# Patient Record
Sex: MALE | Race: WHITE | NOT HISPANIC OR LATINO | URBAN - METROPOLITAN AREA
[De-identification: names, ages, dates, MRNs, and addresses within clinical notes are randomized per-mention and may not be internally consistent; named-entity substitution may affect disease eponyms.]

---

## 2019-05-20 ENCOUNTER — APPOINTMENT (RX ONLY)
Dept: URBAN - METROPOLITAN AREA CLINIC 125 | Facility: CLINIC | Age: 64
Setting detail: DERMATOLOGY
End: 2019-05-20

## 2019-05-20 DIAGNOSIS — L29.89 OTHER PRURITUS: ICD-10-CM

## 2019-05-20 DIAGNOSIS — L82.0 INFLAMED SEBORRHEIC KERATOSIS: ICD-10-CM

## 2019-05-20 DIAGNOSIS — L82.1 OTHER SEBORRHEIC KERATOSIS: ICD-10-CM

## 2019-05-20 DIAGNOSIS — L81.4 OTHER MELANIN HYPERPIGMENTATION: ICD-10-CM

## 2019-05-20 DIAGNOSIS — B07.8 OTHER VIRAL WARTS: ICD-10-CM

## 2019-05-20 PROBLEM — L55.1 SUNBURN OF SECOND DEGREE: Status: ACTIVE | Noted: 2019-05-20

## 2019-05-20 PROBLEM — L29.8 OTHER PRURITUS: Status: ACTIVE | Noted: 2019-05-20

## 2019-05-20 PROBLEM — D48.5 NEOPLASM OF UNCERTAIN BEHAVIOR OF SKIN: Status: ACTIVE | Noted: 2019-05-20

## 2019-05-20 PROBLEM — E13.9 OTHER SPECIFIED DIABETES MELLITUS WITHOUT COMPLICATIONS: Status: ACTIVE | Noted: 2019-05-20

## 2019-05-20 PROBLEM — M12.9 ARTHROPATHY, UNSPECIFIED: Status: ACTIVE | Noted: 2019-05-20

## 2019-05-20 PROBLEM — L70.0 ACNE VULGARIS: Status: ACTIVE | Noted: 2019-05-20

## 2019-05-20 PROBLEM — I10 ESSENTIAL (PRIMARY) HYPERTENSION: Status: ACTIVE | Noted: 2019-05-20

## 2019-05-20 PROBLEM — L23.7 ALLERGIC CONTACT DERMATITIS DUE TO PLANTS, EXCEPT FOOD: Status: ACTIVE | Noted: 2019-05-20

## 2019-05-20 PROCEDURE — ? COUNSELING

## 2019-05-20 PROCEDURE — 99202 OFFICE O/P NEW SF 15 MIN: CPT

## 2019-05-20 PROCEDURE — ? TREATMENT REGIMEN

## 2019-05-20 ASSESSMENT — LOCATION DETAILED DESCRIPTION DERM
LOCATION DETAILED: RIGHT DISTAL DORSAL FOREARM
LOCATION DETAILED: RIGHT PROXIMAL RADIAL DORSAL FOREARM
LOCATION DETAILED: RIGHT PROXIMAL DORSAL FOREARM
LOCATION DETAILED: MID POSTERIOR NECK
LOCATION DETAILED: LEFT DISTAL DORSAL FOREARM
LOCATION DETAILED: LEFT NASAL ALA
LOCATION DETAILED: LEFT PROXIMAL DORSAL FOREARM

## 2019-05-20 ASSESSMENT — LOCATION SIMPLE DESCRIPTION DERM
LOCATION SIMPLE: LEFT NOSE
LOCATION SIMPLE: RIGHT FOREARM
LOCATION SIMPLE: LEFT FOREARM
LOCATION SIMPLE: POSTERIOR NECK

## 2019-05-20 ASSESSMENT — LOCATION ZONE DERM
LOCATION ZONE: NECK
LOCATION ZONE: NOSE
LOCATION ZONE: ARM

## 2019-05-20 NOTE — PROCEDURE: TREATMENT REGIMEN
Initiate Treatment: Clobetasol cream twice dialy PRN for itching (pt has rx)
Detail Level: Zone
Initiate Treatment: Sarna lotion
Continue Regimen: Clobetasol cream twice daily only when pt has a rash to back of neck

## 2019-05-20 NOTE — PROCEDURE: MIPS QUALITY
Additional Notes: Pain 9-10
Quality 474: Zoster Vaccination Status: Shingrix Vaccination not Administered or Previously Received, Reason not Otherwise Specified
Detail Level: Detailed
Quality 130: Documentation Of Current Medications In The Medical Record: Current Medications Documented
Quality 131: Pain Assessment And Follow-Up: Pain assessment using a standardized tool is documented as negative, no follow-up plan required

## 2019-06-10 ENCOUNTER — APPOINTMENT (RX ONLY)
Dept: URBAN - METROPOLITAN AREA CLINIC 125 | Facility: CLINIC | Age: 64
Setting detail: DERMATOLOGY
End: 2019-06-10

## 2019-06-10 DIAGNOSIS — L82.0 INFLAMED SEBORRHEIC KERATOSIS: ICD-10-CM

## 2019-06-10 DIAGNOSIS — L91.8 OTHER HYPERTROPHIC DISORDERS OF THE SKIN: ICD-10-CM

## 2019-06-10 DIAGNOSIS — L81.4 OTHER MELANIN HYPERPIGMENTATION: ICD-10-CM

## 2019-06-10 DIAGNOSIS — B35.1 TINEA UNGUIUM: ICD-10-CM

## 2019-06-10 DIAGNOSIS — D22 MELANOCYTIC NEVI: ICD-10-CM

## 2019-06-10 DIAGNOSIS — D18.0 HEMANGIOMA: ICD-10-CM

## 2019-06-10 DIAGNOSIS — L82.1 OTHER SEBORRHEIC KERATOSIS: ICD-10-CM

## 2019-06-10 DIAGNOSIS — Z71.89 OTHER SPECIFIED COUNSELING: ICD-10-CM

## 2019-06-10 PROBLEM — D22.5 MELANOCYTIC NEVI OF TRUNK: Status: ACTIVE | Noted: 2019-06-10

## 2019-06-10 PROBLEM — D18.01 HEMANGIOMA OF SKIN AND SUBCUTANEOUS TISSUE: Status: ACTIVE | Noted: 2019-06-10

## 2019-06-10 PROCEDURE — 17110 DESTRUCTION B9 LES UP TO 14: CPT

## 2019-06-10 PROCEDURE — ? LIQUID NITROGEN

## 2019-06-10 PROCEDURE — 99214 OFFICE O/P EST MOD 30 MIN: CPT | Mod: 25

## 2019-06-10 PROCEDURE — ? COUNSELING

## 2019-06-10 ASSESSMENT — LOCATION SIMPLE DESCRIPTION DERM
LOCATION SIMPLE: LEFT ANTERIOR NECK
LOCATION SIMPLE: UPPER BACK
LOCATION SIMPLE: LEFT GREAT TOE
LOCATION SIMPLE: ABDOMEN
LOCATION SIMPLE: RIGHT GREAT TOE
LOCATION SIMPLE: LEFT UPPER BACK
LOCATION SIMPLE: RIGHT ANTERIOR NECK
LOCATION SIMPLE: LEFT NOSE

## 2019-06-10 ASSESSMENT — LOCATION ZONE DERM
LOCATION ZONE: TRUNK
LOCATION ZONE: TOENAIL
LOCATION ZONE: NOSE
LOCATION ZONE: NECK

## 2019-06-10 ASSESSMENT — LOCATION DETAILED DESCRIPTION DERM
LOCATION DETAILED: XIPHOID
LOCATION DETAILED: INFERIOR THORACIC SPINE
LOCATION DETAILED: LEFT NASAL ALA
LOCATION DETAILED: LEFT GREAT TOENAIL
LOCATION DETAILED: RIGHT GREAT TOENAIL
LOCATION DETAILED: LEFT MID-UPPER BACK
LOCATION DETAILED: LEFT CLAVICULAR NECK
LOCATION DETAILED: RIGHT CLAVICULAR NECK

## 2019-06-10 NOTE — PROCEDURE: MIPS QUALITY
Quality 474: Zoster Vaccination Status: Shingrix Vaccination not Administered or Previously Received, Reason not Otherwise Specified
Detail Level: Detailed
Quality 130: Documentation Of Current Medications In The Medical Record: Current Medications Documented
Quality 131: Pain Assessment And Follow-Up: Pain assessment using a standardized tool is documented as negative, no follow-up plan required
Additional Notes: Patient states on a scale of 0-10. Pain level is 0.

## 2019-06-10 NOTE — PROCEDURE: LIQUID NITROGEN
Add 52 Modifier (Optional): no
Consent: The patient's consent was obtained including but not limited to risks of crusting, scabbing, blistering, scarring, darker or lighter pigmentary change, recurrence, incomplete removal and infection.
Medical Necessity Clause: This procedure was medically necessary because the lesions that were treated were:
Post-Care Instructions: I reviewed with the patient in detail post-care instructions. Patient is to wear sunprotection, and avoid picking at any of the treated lesions. Pt may apply Vaseline to crusted or scabbing areas.
Medical Necessity Information: It is in your best interest to select a reason for this procedure from the list below. All of these items fulfill various CMS LCD requirements except the new and changing color options.
Number Of Freeze-Thaw Cycles: 2 freeze-thaw cycles
Render Post-Care Instructions In Note?: yes
Duration Of Freeze Thaw-Cycle (Seconds): 10-15
Detail Level: Simple

## 2020-02-27 ENCOUNTER — OFFICE VISIT (OUTPATIENT)
Dept: INTERNAL MEDICINE | Facility: CLINIC | Age: 65
End: 2020-02-27
Payer: MEDICARE

## 2020-02-27 VITALS
HEART RATE: 66 BPM | BODY MASS INDEX: 37.05 KG/M2 | RESPIRATION RATE: 18 BRPM | DIASTOLIC BLOOD PRESSURE: 72 MMHG | HEIGHT: 72 IN | TEMPERATURE: 99 F | WEIGHT: 273.56 LBS | SYSTOLIC BLOOD PRESSURE: 118 MMHG

## 2020-02-27 DIAGNOSIS — E78.5 HYPERLIPIDEMIA, UNSPECIFIED HYPERLIPIDEMIA TYPE: ICD-10-CM

## 2020-02-27 DIAGNOSIS — R79.89 LOW TESTOSTERONE IN MALE: ICD-10-CM

## 2020-02-27 DIAGNOSIS — Z87.39 HISTORY OF GOUT: ICD-10-CM

## 2020-02-27 DIAGNOSIS — N20.0 KIDNEY STONES: ICD-10-CM

## 2020-02-27 DIAGNOSIS — E11.9 TYPE 2 DIABETES MELLITUS WITHOUT COMPLICATION, WITHOUT LONG-TERM CURRENT USE OF INSULIN: ICD-10-CM

## 2020-02-27 DIAGNOSIS — R53.83 FATIGUE, UNSPECIFIED TYPE: Primary | ICD-10-CM

## 2020-02-27 DIAGNOSIS — Z13.5 SCREENING FOR EYE CONDITION: ICD-10-CM

## 2020-02-27 DIAGNOSIS — Z00.00 ANNUAL PHYSICAL EXAM: ICD-10-CM

## 2020-02-27 PROCEDURE — 99387 PR PREVENTIVE VISIT,NEW,65 & OVER: ICD-10-PCS | Mod: S$PBB,,, | Performed by: NURSE PRACTITIONER

## 2020-02-27 PROCEDURE — 99387 INIT PM E/M NEW PAT 65+ YRS: CPT | Mod: S$PBB,,, | Performed by: NURSE PRACTITIONER

## 2020-02-27 PROCEDURE — 99999 PR PBB SHADOW E&M-NEW PATIENT-LVL IV: ICD-10-PCS | Mod: PBBFAC,,, | Performed by: NURSE PRACTITIONER

## 2020-02-27 PROCEDURE — 99204 OFFICE O/P NEW MOD 45 MIN: CPT | Mod: PBBFAC,PO | Performed by: NURSE PRACTITIONER

## 2020-02-27 PROCEDURE — 99999 PR PBB SHADOW E&M-NEW PATIENT-LVL IV: CPT | Mod: PBBFAC,,, | Performed by: NURSE PRACTITIONER

## 2020-02-27 RX ORDER — ALLOPURINOL 100 MG/1
TABLET ORAL
COMMUNITY
End: 2020-03-10 | Stop reason: SDUPTHER

## 2020-02-27 RX ORDER — GLIMEPIRIDE 2 MG/1
TABLET ORAL
COMMUNITY
End: 2020-03-10 | Stop reason: SDUPTHER

## 2020-02-27 RX ORDER — FENOFIBRATE 160 MG/1
160 TABLET ORAL DAILY
COMMUNITY
End: 2020-03-10 | Stop reason: SDUPTHER

## 2020-02-27 RX ORDER — METFORMIN HYDROCHLORIDE 500 MG/1
TABLET ORAL
COMMUNITY
End: 2020-03-10 | Stop reason: SDUPTHER

## 2020-02-27 RX ORDER — POTASSIUM CITRATE 10 MEQ/1
TABLET, EXTENDED RELEASE ORAL
COMMUNITY
End: 2020-06-22 | Stop reason: SDUPTHER

## 2020-02-27 RX ORDER — LISINOPRIL AND HYDROCHLOROTHIAZIDE 20; 25 MG/1; MG/1
TABLET ORAL
COMMUNITY
Start: 2020-02-24 | End: 2020-03-10 | Stop reason: SDUPTHER

## 2020-03-04 ENCOUNTER — LAB VISIT (OUTPATIENT)
Dept: LAB | Facility: HOSPITAL | Age: 65
End: 2020-03-04
Attending: NURSE PRACTITIONER
Payer: MEDICARE

## 2020-03-04 DIAGNOSIS — Z87.39 HISTORY OF GOUT: ICD-10-CM

## 2020-03-04 DIAGNOSIS — E78.5 HYPERLIPIDEMIA, UNSPECIFIED HYPERLIPIDEMIA TYPE: ICD-10-CM

## 2020-03-04 DIAGNOSIS — R79.89 LOW TESTOSTERONE IN MALE: ICD-10-CM

## 2020-03-04 DIAGNOSIS — E11.9 TYPE 2 DIABETES MELLITUS WITHOUT COMPLICATION, WITHOUT LONG-TERM CURRENT USE OF INSULIN: ICD-10-CM

## 2020-03-04 DIAGNOSIS — N20.0 KIDNEY STONES: ICD-10-CM

## 2020-03-04 DIAGNOSIS — R53.83 FATIGUE, UNSPECIFIED TYPE: ICD-10-CM

## 2020-03-04 DIAGNOSIS — Z00.00 ANNUAL PHYSICAL EXAM: ICD-10-CM

## 2020-03-04 LAB
ALBUMIN SERPL BCP-MCNC: 4.1 G/DL (ref 3.5–5.2)
ALP SERPL-CCNC: 53 U/L (ref 55–135)
ALT SERPL W/O P-5'-P-CCNC: 43 U/L (ref 10–44)
ANION GAP SERPL CALC-SCNC: 10 MMOL/L (ref 8–16)
AST SERPL-CCNC: 34 U/L (ref 10–40)
BASOPHILS # BLD AUTO: 0.06 K/UL (ref 0–0.2)
BASOPHILS NFR BLD: 1.1 % (ref 0–1.9)
BILIRUB SERPL-MCNC: 0.3 MG/DL (ref 0.1–1)
BUN SERPL-MCNC: 19 MG/DL (ref 8–23)
CALCIUM SERPL-MCNC: 9.5 MG/DL (ref 8.7–10.5)
CHLORIDE SERPL-SCNC: 102 MMOL/L (ref 95–110)
CHOLEST SERPL-MCNC: 199 MG/DL (ref 120–199)
CHOLEST/HDLC SERPL: 4.7 {RATIO} (ref 2–5)
CO2 SERPL-SCNC: 28 MMOL/L (ref 23–29)
CREAT SERPL-MCNC: 1.4 MG/DL (ref 0.5–1.4)
DIFFERENTIAL METHOD: ABNORMAL
EOSINOPHIL # BLD AUTO: 0.2 K/UL (ref 0–0.5)
EOSINOPHIL NFR BLD: 3.5 % (ref 0–8)
ERYTHROCYTE [DISTWIDTH] IN BLOOD BY AUTOMATED COUNT: 13.5 % (ref 11.5–14.5)
EST. GFR  (AFRICAN AMERICAN): >60 ML/MIN/1.73 M^2
EST. GFR  (NON AFRICAN AMERICAN): 52.4 ML/MIN/1.73 M^2
ESTIMATED AVG GLUCOSE: 183 MG/DL (ref 68–131)
GLUCOSE SERPL-MCNC: 146 MG/DL (ref 70–110)
HBA1C MFR BLD HPLC: 8 % (ref 4–5.6)
HCT VFR BLD AUTO: 45.7 % (ref 40–54)
HDLC SERPL-MCNC: 42 MG/DL (ref 40–75)
HDLC SERPL: 21.1 % (ref 20–50)
HGB BLD-MCNC: 13.8 G/DL (ref 14–18)
IMM GRANULOCYTES # BLD AUTO: 0.04 K/UL (ref 0–0.04)
IMM GRANULOCYTES NFR BLD AUTO: 0.7 % (ref 0–0.5)
LDLC SERPL CALC-MCNC: 108.8 MG/DL (ref 63–159)
LYMPHOCYTES # BLD AUTO: 2.2 K/UL (ref 1–4.8)
LYMPHOCYTES NFR BLD: 39.4 % (ref 18–48)
MCH RBC QN AUTO: 29.2 PG (ref 27–31)
MCHC RBC AUTO-ENTMCNC: 30.2 G/DL (ref 32–36)
MCV RBC AUTO: 97 FL (ref 82–98)
MONOCYTES # BLD AUTO: 0.5 K/UL (ref 0.3–1)
MONOCYTES NFR BLD: 7.9 % (ref 4–15)
NEUTROPHILS # BLD AUTO: 2.7 K/UL (ref 1.8–7.7)
NEUTROPHILS NFR BLD: 47.4 % (ref 38–73)
NONHDLC SERPL-MCNC: 157 MG/DL
NRBC BLD-RTO: 0 /100 WBC
PLATELET # BLD AUTO: 192 K/UL (ref 150–350)
PMV BLD AUTO: 10.7 FL (ref 9.2–12.9)
POTASSIUM SERPL-SCNC: 4.9 MMOL/L (ref 3.5–5.1)
PROT SERPL-MCNC: 7.3 G/DL (ref 6–8.4)
RBC # BLD AUTO: 4.72 M/UL (ref 4.6–6.2)
SODIUM SERPL-SCNC: 140 MMOL/L (ref 136–145)
T4 FREE SERPL-MCNC: 0.96 NG/DL (ref 0.71–1.51)
TESTOST SERPL-MCNC: 240 NG/DL (ref 304–1227)
TRIGL SERPL-MCNC: 241 MG/DL (ref 30–150)
TSH SERPL DL<=0.005 MIU/L-ACNC: 2.01 UIU/ML (ref 0.4–4)
URATE SERPL-MCNC: 5.6 MG/DL (ref 3.4–7)
WBC # BLD AUTO: 5.68 K/UL (ref 3.9–12.7)

## 2020-03-04 PROCEDURE — 36415 COLL VENOUS BLD VENIPUNCTURE: CPT | Mod: PO

## 2020-03-04 PROCEDURE — 84403 ASSAY OF TOTAL TESTOSTERONE: CPT

## 2020-03-04 PROCEDURE — 84550 ASSAY OF BLOOD/URIC ACID: CPT

## 2020-03-04 PROCEDURE — 83036 HEMOGLOBIN GLYCOSYLATED A1C: CPT

## 2020-03-04 PROCEDURE — 80053 COMPREHEN METABOLIC PANEL: CPT

## 2020-03-04 PROCEDURE — 84443 ASSAY THYROID STIM HORMONE: CPT

## 2020-03-04 PROCEDURE — 80061 LIPID PANEL: CPT

## 2020-03-04 PROCEDURE — 85025 COMPLETE CBC W/AUTO DIFF WBC: CPT

## 2020-03-04 PROCEDURE — 84439 ASSAY OF FREE THYROXINE: CPT

## 2020-03-06 ENCOUNTER — OFFICE VISIT (OUTPATIENT)
Dept: OPTOMETRY | Facility: CLINIC | Age: 65
End: 2020-03-06
Payer: MEDICARE

## 2020-03-06 DIAGNOSIS — E11.9 TYPE 2 DIABETES MELLITUS WITHOUT RETINOPATHY: Primary | ICD-10-CM

## 2020-03-06 DIAGNOSIS — H52.203 HYPEROPIA WITH ASTIGMATISM AND PRESBYOPIA, BILATERAL: ICD-10-CM

## 2020-03-06 DIAGNOSIS — E11.9 TYPE 2 DIABETES MELLITUS WITHOUT COMPLICATION, WITHOUT LONG-TERM CURRENT USE OF INSULIN: ICD-10-CM

## 2020-03-06 DIAGNOSIS — H25.13 NUCLEAR SCLEROSIS OF BOTH EYES: ICD-10-CM

## 2020-03-06 DIAGNOSIS — H52.03 HYPEROPIA WITH ASTIGMATISM AND PRESBYOPIA, BILATERAL: ICD-10-CM

## 2020-03-06 DIAGNOSIS — H52.4 HYPEROPIA WITH ASTIGMATISM AND PRESBYOPIA, BILATERAL: ICD-10-CM

## 2020-03-06 DIAGNOSIS — Z13.5 SCREENING FOR EYE CONDITION: ICD-10-CM

## 2020-03-06 PROCEDURE — 92015 PR REFRACTION: ICD-10-PCS | Mod: ,,, | Performed by: OPTOMETRIST

## 2020-03-06 PROCEDURE — 99213 OFFICE O/P EST LOW 20 MIN: CPT | Mod: PBBFAC,PO | Performed by: OPTOMETRIST

## 2020-03-06 PROCEDURE — 99999 PR PBB SHADOW E&M-EST. PATIENT-LVL III: CPT | Mod: PBBFAC,,, | Performed by: OPTOMETRIST

## 2020-03-06 PROCEDURE — 92015 DETERMINE REFRACTIVE STATE: CPT | Mod: ,,, | Performed by: OPTOMETRIST

## 2020-03-06 PROCEDURE — 99999 PR PBB SHADOW E&M-EST. PATIENT-LVL III: ICD-10-PCS | Mod: PBBFAC,,, | Performed by: OPTOMETRIST

## 2020-03-06 PROCEDURE — 92004 PR EYE EXAM, NEW PATIENT,COMPREHESV: ICD-10-PCS | Mod: S$PBB,,, | Performed by: OPTOMETRIST

## 2020-03-06 PROCEDURE — 92004 COMPRE OPH EXAM NEW PT 1/>: CPT | Mod: S$PBB,,, | Performed by: OPTOMETRIST

## 2020-03-06 NOTE — PROGRESS NOTES
HPI     PARTH:10 mons   Glasses? Yes rdrs   Contacts? no  H/o eye surgery, injections or laser: no  H/o eye injury: no  Known eye conditions? no  Family h/o eye conditions? Mother-RD Father-cataracts   Eye gtts?Visine, ,systane PRN     (-) Flashes (-) Floaters (-) Mucous   (-) Tearing (-) Itching (-) Burning   (-) Headaches (-) Eye Pain/discomfort (-) Irritation   (-) Redness (-) Double vision (+) Blurry vision Overall when sugar levels   are off     Diabetic? (+)  A1c?  (Hemoglobin A1C       Date                     Value               Ref Range             Status                03/04/2020               8.0 (H)             4.0 - 5.6 %           Final              Comment:    ADA Screening Guidelines:  5.7-6.4%  Consistent with   prediabetes  >or=6.5%  Consistent with diabetes  High levels of fetal   hemoglobin interfere with the HbA1C  assay. Heterozygous hemoglobin   variants (HbS, HgC, etc)do  not significantly interfere with this assay.     However, presence of multiple variants may affect accuracy.    ----------)        Last edited by Genna Heranndez on 3/6/2020 10:52 AM. (History)            Assessment /Plan     For exam results, see Encounter Report.    Type 2 diabetes mellitus without retinopathy    Type 2 diabetes mellitus without complication, without long-term current use of insulin  -     Ambulatory referral/consult to Ophthalmology    Screening for eye condition  -     Ambulatory referral/consult to Ophthalmology    Nuclear sclerosis of both eyes    Hyperopia with astigmatism and presbyopia, bilateral      1-3. BS control. No signs of diabetic retinopathy. Monitor with annual exam.  4. Nuclear sclerotic cataract - not visually significant. Observe.  5. SRx released to patient. Patient educated on lens options. Normal ocular health. RTC 1 year for routine exam.

## 2020-03-10 DIAGNOSIS — E78.1 HYPERTRIGLYCERIDEMIA: Primary | ICD-10-CM

## 2020-03-10 DIAGNOSIS — E11.65 UNCONTROLLED TYPE 2 DIABETES MELLITUS WITH HYPERGLYCEMIA: ICD-10-CM

## 2020-03-10 RX ORDER — FENOFIBRATE 160 MG/1
160 TABLET ORAL DAILY
Qty: 30 TABLET | Refills: 3 | Status: SHIPPED | OUTPATIENT
Start: 2020-03-10 | End: 2020-07-08

## 2020-03-10 RX ORDER — METFORMIN HYDROCHLORIDE 500 MG/1
TABLET ORAL
Qty: 30 TABLET | Refills: 3 | Status: SHIPPED | OUTPATIENT
Start: 2020-03-10 | End: 2020-07-08

## 2020-03-10 RX ORDER — GLIMEPIRIDE 2 MG/1
TABLET ORAL
Qty: 60 TABLET | Refills: 3 | Status: SHIPPED | OUTPATIENT
Start: 2020-03-10 | End: 2020-06-13

## 2020-03-10 RX ORDER — ALLOPURINOL 100 MG/1
TABLET ORAL
Qty: 30 TABLET | Refills: 3 | Status: SHIPPED | OUTPATIENT
Start: 2020-03-10 | End: 2020-07-08

## 2020-03-10 RX ORDER — LISINOPRIL AND HYDROCHLOROTHIAZIDE 20; 25 MG/1; MG/1
1 TABLET ORAL DAILY
Qty: 30 TABLET | Refills: 3 | Status: SHIPPED | OUTPATIENT
Start: 2020-03-10 | End: 2020-06-13

## 2020-03-10 NOTE — TELEPHONE ENCOUNTER
So very sorry for the delay    Testosterone level low at 240, range starts at 300, we can refer to urology for further eval since we don't manage this med  Uric acid level wnl  A1c at 8%, needs to be 7% or less, will need to increase glimepiride to 4mg, cont metformin. Needs to follow diabetic diet, stay hydrated with water-would pt like a referral to diabetic teaching?  Cholesterol panel, triglycerides elevated at 241, should be less than 150, decrease carbs, alcohol, weight loss  Thyroid level wnl  Kidney  Function is 52.4, would like it to be >60, diabetes and cholesterol is a strain,   lilver fcn, electrolytes, wbc all wnl  No infection or anemia noted    Pt will need to f/u with a physician (Dr. Turner) w/in 3 months  Will reorder labs for repeat in 3 months

## 2020-03-11 ENCOUNTER — TELEPHONE (OUTPATIENT)
Dept: INTERNAL MEDICINE | Facility: CLINIC | Age: 65
End: 2020-03-11

## 2020-03-11 NOTE — TELEPHONE ENCOUNTER
----- Message from Summer Ann sent at 3/11/2020  4:19 PM CDT -----  Contact: Patient [phone]  Patient is returning a phone call.  Who left a message for the patient:   Does patient know what this is regarding:    Comments:

## 2020-03-13 ENCOUNTER — TELEPHONE (OUTPATIENT)
Dept: INTERNAL MEDICINE | Facility: CLINIC | Age: 65
End: 2020-03-13

## 2020-03-13 NOTE — TELEPHONE ENCOUNTER
----- Message from Marquita Manuel sent at 3/13/2020  4:04 PM CDT -----  Contact: [phone]  Patient would like to speak to the nurse in regards to his coming appointment for Monday 3-16-20 at 9:30AM. Please call and advise.

## 2020-03-17 ENCOUNTER — TELEPHONE (OUTPATIENT)
Dept: INTERNAL MEDICINE | Facility: CLINIC | Age: 65
End: 2020-03-17

## 2020-03-17 NOTE — TELEPHONE ENCOUNTER
----- Message from Ralph Martinez sent at 3/17/2020  4:30 PM CDT -----  Contact: Self [phone]  Calling to get test results.  Name of test (lab, xray, etc.):   labs  Date of test:  3/4/20  Ordering provider: GLENDA Morrison  Where was the test performed:  [facility]  Would the patient rather a call back or a response via [facility]?:  Call back  Comments:

## 2020-06-22 NOTE — TELEPHONE ENCOUNTER
----- Message from Karla Otoole sent at 6/22/2020 11:14 AM CDT -----  Regarding: appointments and refill  Contact: patient [phone]  Requesting an RX refill or new RX.  Is this a refill or new RX:  refill  RX name and strength: potassium citrate (UROCIT-K) 10 mEq (1,080 mg) TbSR  Directions (copy/paste from chart): potassium citrate ER 10 mEq (1,080 mg) tablet,extended release   Is this a 30 day or 90 day RX:  90  Local pharmacy or mail order pharmacy:  local  Pharmacy name and phone # [id number] - [place], [place] - [address] AT [facility] [address] [phone] (Phone)  [phone] (Fax)      Patient would also discuss appointments that you spoke about before COVID 19 at last visit

## 2020-06-23 RX ORDER — POTASSIUM CITRATE 10 MEQ/1
TABLET, EXTENDED RELEASE ORAL
Qty: 60 TABLET | Refills: 1 | Status: SHIPPED | OUTPATIENT
Start: 2020-06-23 | End: 2020-06-23 | Stop reason: SDUPTHER

## 2020-06-23 RX ORDER — POTASSIUM CITRATE 10 MEQ/1
TABLET, EXTENDED RELEASE ORAL
Qty: 60 TABLET | Refills: 1 | Status: SHIPPED | OUTPATIENT
Start: 2020-06-23 | End: 2020-08-23

## 2020-08-20 ENCOUNTER — OFFICE VISIT (OUTPATIENT)
Dept: INTERNAL MEDICINE | Facility: CLINIC | Age: 65
End: 2020-08-20
Payer: MEDICARE

## 2020-08-20 ENCOUNTER — LAB VISIT (OUTPATIENT)
Dept: LAB | Facility: HOSPITAL | Age: 65
End: 2020-08-20
Attending: NURSE PRACTITIONER
Payer: MEDICARE

## 2020-08-20 VITALS
RESPIRATION RATE: 16 BRPM | HEART RATE: 76 BPM | TEMPERATURE: 98 F | BODY MASS INDEX: 36.16 KG/M2 | DIASTOLIC BLOOD PRESSURE: 62 MMHG | HEIGHT: 72 IN | SYSTOLIC BLOOD PRESSURE: 126 MMHG | OXYGEN SATURATION: 95 % | WEIGHT: 267 LBS

## 2020-08-20 DIAGNOSIS — R94.31 ABNORMAL EKG: ICD-10-CM

## 2020-08-20 DIAGNOSIS — N18.30 CKD (CHRONIC KIDNEY DISEASE) STAGE 3, GFR 30-59 ML/MIN: ICD-10-CM

## 2020-08-20 DIAGNOSIS — I10 ESSENTIAL HYPERTENSION: ICD-10-CM

## 2020-08-20 DIAGNOSIS — E11.9 TYPE 2 DIABETES MELLITUS WITHOUT COMPLICATION, WITHOUT LONG-TERM CURRENT USE OF INSULIN: ICD-10-CM

## 2020-08-20 DIAGNOSIS — E11.9 TYPE 2 DIABETES MELLITUS WITHOUT COMPLICATION, WITHOUT LONG-TERM CURRENT USE OF INSULIN: Primary | ICD-10-CM

## 2020-08-20 DIAGNOSIS — Z79.4 TYPE 2 DIABETES MELLITUS WITHOUT COMPLICATION, WITH LONG-TERM CURRENT USE OF INSULIN: ICD-10-CM

## 2020-08-20 DIAGNOSIS — M10.9 GOUT, UNSPECIFIED CAUSE, UNSPECIFIED CHRONICITY, UNSPECIFIED SITE: ICD-10-CM

## 2020-08-20 DIAGNOSIS — R53.83 FATIGUE, UNSPECIFIED TYPE: ICD-10-CM

## 2020-08-20 DIAGNOSIS — E78.1 HYPERTRIGLYCERIDEMIA: ICD-10-CM

## 2020-08-20 DIAGNOSIS — R07.89 CHEST TIGHTNESS: ICD-10-CM

## 2020-08-20 DIAGNOSIS — G47.33 OSA (OBSTRUCTIVE SLEEP APNEA): ICD-10-CM

## 2020-08-20 DIAGNOSIS — E11.9 TYPE 2 DIABETES MELLITUS WITHOUT COMPLICATION, WITH LONG-TERM CURRENT USE OF INSULIN: ICD-10-CM

## 2020-08-20 LAB
ALBUMIN SERPL BCP-MCNC: 4.3 G/DL (ref 3.5–5.2)
ALP SERPL-CCNC: 49 U/L (ref 55–135)
ALT SERPL W/O P-5'-P-CCNC: 38 U/L (ref 10–44)
ANION GAP SERPL CALC-SCNC: 10 MMOL/L (ref 8–16)
AST SERPL-CCNC: 42 U/L (ref 10–40)
BASOPHILS # BLD AUTO: 0.04 K/UL (ref 0–0.2)
BASOPHILS NFR BLD: 0.6 % (ref 0–1.9)
BILIRUB SERPL-MCNC: 0.3 MG/DL (ref 0.1–1)
BUN SERPL-MCNC: 21 MG/DL (ref 8–23)
CALCIUM SERPL-MCNC: 9.9 MG/DL (ref 8.7–10.5)
CHLORIDE SERPL-SCNC: 106 MMOL/L (ref 95–110)
CO2 SERPL-SCNC: 27 MMOL/L (ref 23–29)
CREAT SERPL-MCNC: 1.6 MG/DL (ref 0.5–1.4)
DIFFERENTIAL METHOD: ABNORMAL
EOSINOPHIL # BLD AUTO: 0.3 K/UL (ref 0–0.5)
EOSINOPHIL NFR BLD: 4.2 % (ref 0–8)
ERYTHROCYTE [DISTWIDTH] IN BLOOD BY AUTOMATED COUNT: 13.5 % (ref 11.5–14.5)
EST. GFR  (AFRICAN AMERICAN): 51.1 ML/MIN/1.73 M^2
EST. GFR  (NON AFRICAN AMERICAN): 44.2 ML/MIN/1.73 M^2
GLUCOSE SERPL-MCNC: 137 MG/DL (ref 70–110)
HCT VFR BLD AUTO: 42.9 % (ref 40–54)
HGB BLD-MCNC: 13.1 G/DL (ref 14–18)
IMM GRANULOCYTES # BLD AUTO: 0.02 K/UL (ref 0–0.04)
IMM GRANULOCYTES NFR BLD AUTO: 0.3 % (ref 0–0.5)
LYMPHOCYTES # BLD AUTO: 2.2 K/UL (ref 1–4.8)
LYMPHOCYTES NFR BLD: 32 % (ref 18–48)
MCH RBC QN AUTO: 29.5 PG (ref 27–31)
MCHC RBC AUTO-ENTMCNC: 30.5 G/DL (ref 32–36)
MCV RBC AUTO: 97 FL (ref 82–98)
MONOCYTES # BLD AUTO: 0.6 K/UL (ref 0.3–1)
MONOCYTES NFR BLD: 8.3 % (ref 4–15)
NEUTROPHILS # BLD AUTO: 3.7 K/UL (ref 1.8–7.7)
NEUTROPHILS NFR BLD: 54.6 % (ref 38–73)
NRBC BLD-RTO: 0 /100 WBC
PLATELET # BLD AUTO: 201 K/UL (ref 150–350)
PMV BLD AUTO: 10.8 FL (ref 9.2–12.9)
POTASSIUM SERPL-SCNC: 4.3 MMOL/L (ref 3.5–5.1)
PROT SERPL-MCNC: 7.4 G/DL (ref 6–8.4)
RBC # BLD AUTO: 4.44 M/UL (ref 4.6–6.2)
SODIUM SERPL-SCNC: 143 MMOL/L (ref 136–145)
WBC # BLD AUTO: 6.71 K/UL (ref 3.9–12.7)

## 2020-08-20 PROCEDURE — 99999 PR PBB SHADOW E&M-EST. PATIENT-LVL IV: CPT | Mod: PBBFAC,,, | Performed by: NURSE PRACTITIONER

## 2020-08-20 PROCEDURE — 80053 COMPREHEN METABOLIC PANEL: CPT

## 2020-08-20 PROCEDURE — 99214 OFFICE O/P EST MOD 30 MIN: CPT | Mod: S$PBB,,, | Performed by: NURSE PRACTITIONER

## 2020-08-20 PROCEDURE — 93005 ELECTROCARDIOGRAM TRACING: CPT | Mod: PBBFAC,PO | Performed by: INTERNAL MEDICINE

## 2020-08-20 PROCEDURE — 93010 EKG 12-LEAD: ICD-10-PCS | Mod: S$PBB,,, | Performed by: INTERNAL MEDICINE

## 2020-08-20 PROCEDURE — 93010 ELECTROCARDIOGRAM REPORT: CPT | Mod: S$PBB,,, | Performed by: INTERNAL MEDICINE

## 2020-08-20 PROCEDURE — 99999 PR PBB SHADOW E&M-EST. PATIENT-LVL IV: ICD-10-PCS | Mod: PBBFAC,,, | Performed by: NURSE PRACTITIONER

## 2020-08-20 PROCEDURE — 85025 COMPLETE CBC W/AUTO DIFF WBC: CPT

## 2020-08-20 PROCEDURE — 36415 COLL VENOUS BLD VENIPUNCTURE: CPT | Mod: PO

## 2020-08-20 PROCEDURE — 83036 HEMOGLOBIN GLYCOSYLATED A1C: CPT

## 2020-08-20 PROCEDURE — 99214 OFFICE O/P EST MOD 30 MIN: CPT | Mod: PBBFAC,25,PO | Performed by: NURSE PRACTITIONER

## 2020-08-20 PROCEDURE — 99214 PR OFFICE/OUTPT VISIT, EST, LEVL IV, 30-39 MIN: ICD-10-PCS | Mod: S$PBB,,, | Performed by: NURSE PRACTITIONER

## 2020-08-21 LAB
ESTIMATED AVG GLUCOSE: 157 MG/DL (ref 68–131)
HBA1C MFR BLD HPLC: 7.1 % (ref 4–5.6)

## 2020-08-22 PROBLEM — I10 ESSENTIAL HYPERTENSION: Status: ACTIVE | Noted: 2020-08-22

## 2020-08-22 PROBLEM — Z79.4 TYPE 2 DIABETES MELLITUS WITHOUT COMPLICATION, WITH LONG-TERM CURRENT USE OF INSULIN: Status: ACTIVE | Noted: 2020-08-22

## 2020-08-22 PROBLEM — E78.1 HYPERTRIGLYCERIDEMIA: Status: ACTIVE | Noted: 2020-08-22

## 2020-08-22 PROBLEM — E11.9 TYPE 2 DIABETES MELLITUS WITHOUT COMPLICATION, WITH LONG-TERM CURRENT USE OF INSULIN: Status: ACTIVE | Noted: 2020-08-22

## 2020-08-22 PROBLEM — G47.33 OSA (OBSTRUCTIVE SLEEP APNEA): Status: ACTIVE | Noted: 2020-08-22

## 2020-08-22 PROBLEM — M10.9 GOUT: Status: ACTIVE | Noted: 2020-08-22

## 2020-08-22 NOTE — PROGRESS NOTES
[facility]    Chief Complaint      Chief Complaint   Patient presents with    [facility]    Annual Exam     History of Present Illness      Lukasz Person is a 65 y.o. male patient who presents today for follow up, wants to discuss meds and further plans.  Patient states he is feeling well,  denies shortness of breath or chest pain, reviewed meds and history with patient.  Patient reports of having neck pain which is causing some headaches over the past few weeks, states has been sleeping on 2 pillows recently, will try to decrease back to 1 pillow, apply warm cool compresses, and take Tylenol for headaches as needed.  A patient reports does walk 2-3 miles per day, but has been feeling very fatigued, and he has taken out after exercise.  Reports a possible occasional pressure in his upper chest and neck at times during exercise.  But states quickly is resolved after a few seconds and is able to continue exercise, denies shortness of breath chest pain during event.    EKG-NSR, nonspecific ST and T wave abnormality, will refer to cards for further eval, no other testing to compare to.    Will get medical records  Pt may need to get repeat sleep study to be able to get new machine supplies.      Labs-reviewed with patient  [id number] from 8% to 7.1%  Colonoscopy-  Eye exam-3/2020           Problem List Items Addressed This Visit        Cardiac/Vascular    Hypertriglyceridemia    Essential hypertension       Endocrine    Type 2 diabetes mellitus without complication, with long-term current use of insulin       Orthopedic    Gout       Other    KUMAR (obstructive sleep apnea)      Other Visit Diagnoses     Type 2 diabetes mellitus without complication, without long-term current use of insulin    -  Primary    Relevant Orders    Hemoglobin A1C (Completed)    CBC auto differential (Completed)    Comprehensive metabolic panel (Completed)    IN OFFICE EKG 12-LEAD (to [facility]) (Completed)    Fatigue,  unspecified type        Relevant Orders    CBC auto differential (Completed)    Comprehensive metabolic panel (Completed)    IN OFFICE EKG 12-LEAD (to [facility]) (Completed)    Chest tightness        Relevant Orders    CBC auto differential (Completed)    Comprehensive metabolic panel (Completed)    IN OFFICE EKG 12-LEAD (to [place]) (Completed)    Ambulatory referral/consult to Cardiology    CKD (chronic kidney disease) stage 3, GFR 30-59 ml/min        Relevant Orders    CBC auto differential (Completed)    Comprehensive metabolic panel (Completed)    IN OFFICE EKG 12-LEAD (to [facility]) (Completed)    Abnormal EKG        Relevant Orders    Ambulatory referral/consult to Cardiology          Health Maintenance   Topic Date Due    Hepatitis C Screening  1955    Foot Exam  03/19/1964    TETANUS VACCINE  03/19/1972    Low Dose Statin  03/19/1975    Pneumococcal Vaccine (65+ Low/Medium Risk) (2 of 2 - PPSV23) 06/24/2020    Hemoglobin A1c  02/20/2021    Lipid Panel  03/04/2021    Eye Exam  03/06/2021       No past medical history on file.    No past surgical history on file.    family history is not on file.     Social History     Tobacco Use    Smoking status: Never Smoker    Smokeless tobacco: Never Used   Substance Use Topics    Alcohol Use     Frequency: 2-4 times a month     Drinks per session: 3 or 4     Binge frequency: Less than monthly    Drug use: Not on file       Review of Systems   Constitutional: Negative for chills and fever.   HENT: Negative for congestion, hearing loss, sinus pain and sore throat.    Eyes: Negative for blurred vision and discharge.   Respiratory: Negative for cough, shortness of breath and wheezing.    Cardiovascular: Negative for chest pain, palpitations and leg swelling.   Gastrointestinal: Negative for abdominal pain, constipation, diarrhea, nausea and vomiting.   Genitourinary: Negative for dysuria and hematuria.   Musculoskeletal: Positive for neck pain. Negative for myalgias.    Skin: Negative for rash.   Neurological: Positive for headaches. Negative for dizziness and weakness.   Psychiatric/Behavioral: Negative for depression. The patient is not nervous/anxious.         Outpatient Encounter Medications as of 8/20/2020   Medication Sig Dispense Refill    allopurinoL (ZYLOPRIM) 100 MG tablet TAKE 1 TABLET BY MOUTH ONCE DAILY 30 tablet 3    fenofibrate 160 MG Tab TAKE 1 TABLET(160 MG) BY MOUTH EVERY DAY 30 tablet 3    glimepiride (AMARYL) 2 MG tablet TAKE 2 TABLETS BY MOUTH EVERY MORNING 60 tablet 3    lisinopriL-hydrochlorothiazide (PRINZIDE,ZESTORETIC) 20-25 mg Tab TAKE 1 TABLET BY MOUTH EVERY DAY 30 tablet 3    metFORMIN (GLUCOPHAGE) 500 MG tablet TAKE 2 TABLETS BY MOUTH EVERY MORNING AND 2 WITH DINNER 120 tablet 0    potassium citrate (UROCIT-K) 10 mEq (1,080 mg) TbSR potassium citrate ER 10 mEq (1,080 mg) tablet,extended release   TAKE ONE TABLET BY MOUTH TWICE A DAY 60 tablet 1     No facility-administered encounter medications on file as of 8/20/2020.        Review of patient's allergies indicates:  No Known Allergies    Physical Exam      Vital Signs  Temp: 97.5 °F (36.4 °C)  Temp src: Temporal  Pulse: 76  Resp: 16  SpO2: 95 %  BP: 126/62  BP Location: Left arm  Patient Position: Sitting  Pain Score:   5  Pain Loc: Neck  Height and Weight  Height: 6' (182.9 cm)  Weight: 121.1 kg (266 lb 15.6 oz)  BSA (Calculated - sq m): 2.48 sq meters  BMI (Calculated): 36.2  Weight in (lb) to have BMI = 25: 183.9]    Physical Exam  Vitals signs and nursing note reviewed.   Constitutional:       Appearance: Normal appearance. He is well-developed.   HENT:      Head: Normocephalic and atraumatic.      Right Ear: External ear normal.      Left Ear: External ear normal.   Eyes:      Conjunctiva/sclera: Conjunctivae normal.   Neck:      Musculoskeletal: Normal range of motion and neck supple.      Thyroid: No thyromegaly.      Vascular: No JVD.      Trachea: No tracheal deviation.    Cardiovascular:      Rate and Rhythm: Normal rate and regular rhythm.      Heart sounds: Normal heart sounds.   Pulmonary:      Effort: Pulmonary effort is normal.      Breath sounds: Normal breath sounds.   Abdominal:      General: Bowel sounds are normal. There is no distension.      Palpations: Abdomen is soft.      Tenderness: There is no abdominal tenderness.   Musculoskeletal: Normal range of motion.   Lymphadenopathy:      Cervical: No cervical adenopathy.   Skin:     General: Skin is warm and dry.   Neurological:      Mental Status: He is alert and oriented to person, place, and time.   Psychiatric:         Behavior: Behavior normal.         Thought Content: Thought content normal.         Judgment: Judgment normal.          Laboratory:  CBC:  Recent Labs   Lab Result Units 08/20/20  1550   WBC K/uL 6.71   RBC M/uL 4.44*   Hemoglobin g/dL 13.1*   Hematocrit % 42.9   Platelets K/uL 201   Mean Corpuscular Volume fL 97   Mean Corpuscular Hemoglobin pg 29.5   Mean Corpuscular Hemoglobin Conc g/dL 30.5*     CMP:  Recent Labs   Lab Result Units 08/20/20  1550   Glucose mg/dL 137*   Calcium mg/dL 9.9   Albumin g/dL 4.3   Total Protein g/dL 7.4   Sodium mmol/L 143   Potassium mmol/L 4.3   CO2 mmol/L 27   Chloride mmol/L 106   BUN, Bld mg/dL 21   Alkaline Phosphatase U/L 49*   ALT U/L 38   AST U/L 42*   Total Bilirubin mg/dL 0.3     URINALYSIS:  No results for input(s): COLORU, CLARITYU, SPECGRAV, PHUR, PROTEINUA, GLUCOSEU, BILIRUBINCON, BLOODU, WBCU, RBCU, BACTERIA, MUCUS, NITRITE, LEUKOCYTESUR, UROBILINOGEN, HYALINECASTS in the last 2160 hours.   LIPIDS:  No results for input(s): TSH, HDL, CHOL, TRIG, LDLCALC, CHOLHDL, NONHDLCHOL, TOTALCHOLEST in the last 2160 hours.  TSH:  No results for input(s): TSH in the last 2160 hours.  A1C:  Recent Labs   Lab Result Units 08/20/20  1550   Hemoglobin A1C % 7.1*         Assessment/Plan     Lukasz Person is a 65 y.o.male with:    1. Type 2 diabetes mellitus without  complication, without long-term current use of insulin  - Hemoglobin A1C; Future  - CBC auto differential; Future  - Comprehensive metabolic panel; Future  - IN OFFICE EKG 12-LEAD (to [facility])    2. Fatigue, unspecified type  - CBC auto differential; Future  - Comprehensive metabolic panel; Future  - IN OFFICE EKG 12-LEAD (to [facility])    3. Chest tightness  - CBC auto differential; Future  - Comprehensive metabolic panel; Future  - IN OFFICE EKG 12-LEAD (to [place])  - Ambulatory referral/consult to Cardiology; Future    4. CKD (chronic kidney disease) stage 3, GFR 30-59 ml/min  - CBC auto differential; Future  - Comprehensive metabolic panel; Future  - IN OFFICE EKG 12-LEAD (to [facility])    5. Abnormal EKG  - Ambulatory referral/consult to Cardiology; Future    6. Type 2 diabetes mellitus without complication, with long-term current use of insulin    7. KUMAR (obstructive sleep apnea)    8. Gout, unspecified cause, unspecified chronicity, unspecified site    9. Hypertriglyceridemia    10. Essential hypertension      -Continue current medications and maintain follow up with specialists.  Return to clinic in 3 months with physician        Erin Jiminez, NP-C [facility] [place]

## 2020-08-24 ENCOUNTER — PATIENT MESSAGE (OUTPATIENT)
Dept: INTERNAL MEDICINE | Facility: CLINIC | Age: 65
End: 2020-08-24

## 2020-08-24 NOTE — TELEPHONE ENCOUNTER
A1c has decreased from 8 to 7.1%   no infection, blood levels stable   slight decrease in Kidney and liver function, stay hydrate with water, Maria Esther NP may want to change diabetic meds to protect kidney and liver function

## 2020-08-25 ENCOUNTER — PES CALL (OUTPATIENT)
Dept: ADMINISTRATIVE | Facility: CLINIC | Age: 65
End: 2020-08-25

## 2020-09-08 ENCOUNTER — OFFICE VISIT (OUTPATIENT)
Dept: CARDIOLOGY | Facility: CLINIC | Age: 65
End: 2020-09-08
Payer: MEDICARE

## 2020-09-08 VITALS
HEIGHT: 72 IN | WEIGHT: 265.31 LBS | BODY MASS INDEX: 35.93 KG/M2 | SYSTOLIC BLOOD PRESSURE: 130 MMHG | HEART RATE: 60 BPM | DIASTOLIC BLOOD PRESSURE: 64 MMHG

## 2020-09-08 DIAGNOSIS — E78.2 MIXED HYPERLIPIDEMIA: ICD-10-CM

## 2020-09-08 DIAGNOSIS — N18.30 CKD (CHRONIC KIDNEY DISEASE) STAGE 3, GFR 30-59 ML/MIN: ICD-10-CM

## 2020-09-08 DIAGNOSIS — I20.89 ANGINA OF EFFORT: Primary | ICD-10-CM

## 2020-09-08 DIAGNOSIS — G47.33 OSA (OBSTRUCTIVE SLEEP APNEA): ICD-10-CM

## 2020-09-08 DIAGNOSIS — I15.2 HYPERTENSION ASSOCIATED WITH DIABETES: ICD-10-CM

## 2020-09-08 DIAGNOSIS — R94.31 ABNORMAL EKG: ICD-10-CM

## 2020-09-08 DIAGNOSIS — E11.59 HYPERTENSION ASSOCIATED WITH DIABETES: ICD-10-CM

## 2020-09-08 PROCEDURE — 99999 PR PBB SHADOW E&M-EST. PATIENT-LVL V: CPT | Mod: PBBFAC,,, | Performed by: INTERNAL MEDICINE

## 2020-09-08 PROCEDURE — 99999 PR PBB SHADOW E&M-EST. PATIENT-LVL V: ICD-10-PCS | Mod: PBBFAC,,, | Performed by: INTERNAL MEDICINE

## 2020-09-08 PROCEDURE — 99204 OFFICE O/P NEW MOD 45 MIN: CPT | Mod: S$PBB,,, | Performed by: INTERNAL MEDICINE

## 2020-09-08 PROCEDURE — 99204 PR OFFICE/OUTPT VISIT, NEW, LEVL IV, 45-59 MIN: ICD-10-PCS | Mod: S$PBB,,, | Performed by: INTERNAL MEDICINE

## 2020-09-08 PROCEDURE — 99215 OFFICE O/P EST HI 40 MIN: CPT | Mod: PBBFAC,PO | Performed by: INTERNAL MEDICINE

## 2020-09-08 RX ORDER — ROSUVASTATIN CALCIUM 20 MG/1
20 TABLET, COATED ORAL DAILY
Qty: 30 TABLET | Refills: 11 | Status: SHIPPED | OUTPATIENT
Start: 2020-09-08 | End: 2021-08-21 | Stop reason: SDUPTHER

## 2020-09-08 NOTE — PATIENT INSTRUCTIONS
Talk to your primary physician about switching one of your diabetes medicines to something like Jardiance (SGLT2-inhibitor) or Victoza (liraglutide injection) that has been shown to reduce the risk of heart attacks    LDL - bad type - improves with diet and medications: typically statins; most other medications that lower LDL have not been proven to prevent heart attacks.  May not improve significantly with exercise alone.  Ideally less than 100 mg/dl.   But the lower the better. Statins (cholesterol lowering meds) lower LDL. If you have coronary artery disease or blockages anywhere else in the body, it should be well below 70 mg/dl.    HDL - good type - improves with exercise.  Ideally greater than 50 mg/dl. The proportion of HDL to the total cholesterol is more important than the absolute HDL.  This means a HDL of 45 out of a total cholesterol of 130 mg'dl is pretty good, but the same HDL out of a total of  200 mg/dl is not quite as good. A level of 30% or higher is ideal.    TGs (triglycerides) - also bad - can change very quickly and considerably with certain foods. Improve with diet, exercise and high dose fish oil.  In some cases a low carbohydrate diet will lower TGs better than a low fat diet.  Ideal range  mg/dl.    Sugar, fat and cholesterol in food:     A sensible diet that limits the intake of sugars, saturated (bad) fats and trans fats while increasing the intake of unsaturated (good) fats and plant proteins is the basis of the current dietary recommendations.      We now recommend drastically reducing the intake of sugar. There is less emphasis on excluding all fat and more emphasis on the types of different fats.    Cholesterol in our food is generally present in relatively small amounts. New dietary guidelines are less obsessed with the amount of cholesterol. However please do not confuse this with the role of cholesterol in our blood and arteries. The liver converts certain foods into  "cholesterol.  It is this cholesterol and other fats that clogs up our arteries.      Most foods that are high in cholesterol are also high in saturated fat. But there is much more saturated fat than cholesterol in these foods. Researchers believe that the saturated fat content matters more than cholesterol. On the other hand, there are a handful of foods that are high in cholesterol but do not contain much saturated fat: eggs, shrimp, crab legs and crawfish are OK to eat in modest quantities as long as you do not deep garcia them. So a few eggs a week are fine (both the white and the yolk), but you can eat as many egg whites as you want. Also, some of these same foods irritate the finding of blood vessels by inducing inflammation.  This occurs even if your blood cholesterol levels are "normal". So you should avoid foods that are high in saturated fat and sugar even if your blood cholesterol levels are normal.      Saturated fat is the bad fat - you should limit your intake of this. Deep fried foods, meats and other animal fats are high in saturated fat. Cookies, donuts and most dessert and cakes are usually high in both saturated fat and sugar.       Unsaturated fat is the good fat. It contains the same number of calories as saturated fat but these fats do not get deposited in our arteries. The Mediterranean style diet encourages the intake of unsaturated fat - olive oil, avocado and unsalted nuts. So instead of baking a piece of fish, consider pan-frying it using olive oil.     You should eat a few servings of vegetables (and fruit as long as you are not diabetic) everyday. Substitute some plant proteins in place of meat: beans, lentils, quinoa and oatmeal. They are lean proteins.     Do not use stick butter or stick margarine. Butter that comes in a tub is soft butter. It consists of 1/2 butter and 1/2 vegetable oil., either canola or olive oil. It is fine to use that.       Trans fats should definitely be avoided. " Most foods that are labelled as containing 0 gms of trans fat may still contain several hundred milligrams of trans fat: creamer, margarine, dough, deep fried foods, ready made frosting, potato, corn and torilla chips, cakes, cookies, pie crusts and crackers containing shortening made with hydrogenated vegetable oil.    =========    In rare cases the cholesterol meds can cause a muscle reaction known as rhabdomyolysis. If you have muscle soreness all over the body, you feel like you have the flu but there is no fever, it may be a reaction from your cholesterol meds. Call us in that situation.    If having surgery or hospitalized for any reason, or you are sick or dehydrated fenofibrate should be stopped for a few days.  Keep well hydrated at all times, particularly in the summer.    If your doctor is prescribing a new medication even if for a short time, always ask if it would interfere with the cholesterol meds that you are taking.

## 2020-09-08 NOTE — PROGRESS NOTES
Subjective:     Problem List:  Diabetes mellitus since his late 50s/early 60  Hypertension since his late 50s/early 60s  Mixed hyperlipidemia  KUMAR - 2006  Lithotripsy  Parathyroidectomy 2017    HPI:   Lukasz Person is a 65 y.o. male referred by Geni Morrison for evaluation of Chest Pain and Abnormal ECG     He reports discomfort in the throat/neck while ambulating.  He started having symptoms about 6 months ago.  The discomfort usually occurrs within 3 minutes of starting his walk and lasts for about 15 minutes.  Thereafter he could continue walking. He stopped walking a few weeks ago because of the heat.  He does not report this discomfort with other activities.  He has been diabetic for the past 6 years.  He has been treated with metformin and glimepiride.  He has also been hypertensive for the past 6 years and takes lisinopril with HCTZ.  He has a mixed hyperlipidemia.  In 03/2020 total cholesterol was nearly 200 with a triglycerides of 241 and a LDL (c) of 109 mg/dL.  He takes fenofibrate but was not prescribed a statin.  He moved from [place] when he was under the care of the same physician for several years.  I do not have prior lipid panels for comparison.  He has sleep apnea and has been using CPAP since 2006.      Review of Systems   Constitution: Positive for malaise/fatigue. Negative for fever, weight gain and weight loss.   HENT: Negative for hearing loss and nosebleeds.    Eyes: Negative for vision loss in left eye and vision loss in right eye.   Cardiovascular: Positive for palpitations. Negative for chest pain, claudication, dyspnea on exertion, irregular heartbeat, leg swelling and syncope.   Respiratory: Negative for cough, hemoptysis, sputum production and wheezing.    Hematologic/Lymphatic: Negative for bleeding problem. Does not bruise/bleed easily.   Musculoskeletal: Positive for arthritis, joint pain, muscle cramps and neck pain. Negative for back pain, falls and muscle weakness.    Gastrointestinal: Negative for abdominal pain, constipation, diarrhea, heartburn, hematochezia and melena.   Genitourinary: Positive for frequency. Negative for hematuria and nocturia.   Neurological: Negative for excessive daytime sleepiness, dizziness, focal weakness, headaches, light-headedness, loss of balance, numbness, paresthesias, seizures and weakness.   Psychiatric/Behavioral: Negative for depression. The patient is not nervous/anxious.        Review of patient's allergies indicates:  No Known Allergies     Current Outpatient Medications   Medication Sig    allopurinoL (ZYLOPRIM) 100 MG tablet TAKE 1 TABLET BY MOUTH ONCE DAILY    fenofibrate 160 MG Tab TAKE 1 TABLET(160 MG) BY MOUTH EVERY DAY    glimepiride (AMARYL) 2 MG tablet TAKE 2 TABLETS BY MOUTH EVERY MORNING    lisinopriL-hydrochlorothiazide (PRINZIDE,ZESTORETIC) 20-25 mg Tab TAKE 1 TABLET BY MOUTH EVERY DAY    metFORMIN (GLUCOPHAGE) 500 MG tablet TAKE 2 TABLETS BY MOUTH EVERY MORNING AND 2 WITH DINNER    potassium citrate (UROCIT-K) 10 mEq (1,080 mg) TbSR TAKE 1 TABLET BY MOUTH TWICE DAILY           Past Medical and Surgical history:  Lukasz Person  has a past medical history of Diabetes mellitus, Hyperlipidemia, Hypertension, and Sleep apnea.   Past Surgical History:   Procedure Laterality Date    LITHOTRIPSY      PARATHYROIDECTOMY  01/01/2017         Social history:  Lukasz Person  reports that he quit smoking about 25 years ago. He smoked 1.00 pack per day. He has never used smokeless tobacco. He reports current alcohol use.    Family history:  Family History   Problem Relation Age of Onset    Heart disease Father 70        CABG    Colon cancer Brother 32            Objective:       Physical Exam   Constitutional: He is oriented to person, place, and time. He appears well-developed and well-nourished.   /64   Pulse 60   Ht 6' (1.829 m)   Wt 120.4 kg (265 lb 5.2 oz)   BMI 35.98 kg/m²      HENT:   Head: Normocephalic and  atraumatic.   Neck: No JVD present. Carotid bruit is not present.   Cardiovascular: Normal rate, regular rhythm, S1 normal and S2 normal. Exam reveals no gallop.   No murmur heard.  Pulses:       Radial pulses are 2+ on the right side and 2+ on the left side.        Posterior tibial pulses are 3+ on the right side and 3+ on the left side.   Pulmonary/Chest: Effort normal. He has no wheezes. He has no rales. Chest wall is not dull to percussion.   Abdominal: Soft. There is no splenomegaly or hepatomegaly. There is no abdominal tenderness.   Musculoskeletal:      Right lower leg: No edema.      Left lower leg: No edema.   Neurological: He is alert and oriented to person, place, and time. Gait normal.   Skin: Skin is warm and dry. No bruising noted. No cyanosis. Nails show no clubbing.   Psychiatric: He has a normal mood and affect. His speech is normal and behavior is normal. Judgment and thought content normal. Cognition and memory are normal.         Lab Results   Component Value Date    CHOL 199 03/04/2020     Lab Results   Component Value Date    HDL 42 03/04/2020     Lab Results   Component Value Date    LDLCALC 108.8 03/04/2020     Lab Results   Component Value Date    TRIG 241 (H) 03/04/2020     Lab Results   Component Value Date    CHOLHDL 21.1 03/04/2020     Lab Results   Component Value Date    ALT 38 08/20/2020    AST 42 (H) 08/20/2020    ALKPHOS 49 (L) 08/20/2020    BILITOT 0.3 08/20/2020      Lab Results   Component Value Date    CREATININE 1.6 (H) 08/20/2020    BUN 21 08/20/2020     08/20/2020    K 4.3 08/20/2020     08/20/2020    CO2 27 08/20/2020       Recent ECG sinus rhythm w T wave abnormality.      Assessment and Plan:       ICD-10-CM ICD-9-CM   1. Angina of effort  I20.8 413.9   2. Abnormal EKG  R94.31 794.31   3. Mixed hyperlipidemia  E78.2 272.2   4. Hypertension associated with diabetes  E11.59 250.80    I10 401.9   5. KUMAR (obstructive sleep apnea)  G47.33 327.23   6. CKD (chronic  kidney disease) stage 3, GFR 30-59 ml/min  N18.3 585.3        It appears that he has walk through angina. A stress test is indicated instead of directly proceeding w a coronary angiogram.  In view of his body habitus (BMI 35) a cardiac PET stress test would be more appropriate than a SPECT.  Hyperlipidemia is treated w a fibrate alone. Add a statin for prevention of MACE. Should also start taking an aspirin.  Discussed risk of rhabdomyolysis and advised temporarily discontinuing the fibrate in situations where the risk increases.    Orders entered during this encounter:    Angina of effort  -     Ambulatory referral/consult to Cardiology  -     Cardiac PET Scan Stress; Future; Expected date: 09/08/2020    Abnormal EKG  -     Ambulatory referral/consult to Cardiology    Mixed hyperlipidemia  -     rosuvastatin (CRESTOR) 20 MG tablet; Take 1 tablet (20 mg total) by mouth once daily.  Dispense: 30 tablet; Refill: 11    Hypertension associated with diabetes    KUMAR (obstructive sleep apnea)  -     Ambulatory referral/consult to Sleep Disorders; Future; Expected date: 09/15/2020    CKD (chronic kidney disease) stage 3, GFR 30-59 ml/min  -     Ambulatory referral/consult to Nephrology; Future; Expected date: 09/15/2020         Follow up in about 6 months (around 3/8/2021).

## 2020-09-08 NOTE — LETTER
September 13, 2020      Geni Morrison NP  [address] [facility]  [place] [address]           [place] - Cardiology  [address] [facility]  [place] [address]  Phone: [phone]          Patient: Lukasz Person   MR Number: [id number]   YOB: 1955   Date of Visit: 9/8/2020       Dear Geni Morrison:    Thank you for referring Lukasz Persno to me for evaluation. Attached you will find relevant portions of my assessment and plan of care.    If you have questions, please do not hesitate to call me. I look forward to following Lukasz Person along with you.    Sincerely,    Pallavi Nick MD    Enclosure  CC:  No Recipients    If you would like to receive this communication electronically, please contact [email or web address] or [phone] to request more information on [employer] Link access.    For providers and/or their staff who would like to refer a patient to [facility], please contact us through our one-stop-shop provider referral line, [facility], at [phone].    If you feel you have received this communication in error or would no longer like to receive these types of communications, please e-mail [email or web address]

## 2020-09-10 ENCOUNTER — OFFICE VISIT (OUTPATIENT)
Dept: SLEEP MEDICINE | Facility: CLINIC | Age: 65
End: 2020-09-10
Payer: MEDICARE

## 2020-09-10 ENCOUNTER — TELEPHONE (OUTPATIENT)
Dept: CARDIOLOGY | Facility: CLINIC | Age: 65
End: 2020-09-10

## 2020-09-10 VITALS
HEART RATE: 65 BPM | HEIGHT: 73 IN | WEIGHT: 266.75 LBS | DIASTOLIC BLOOD PRESSURE: 63 MMHG | BODY MASS INDEX: 35.35 KG/M2 | SYSTOLIC BLOOD PRESSURE: 129 MMHG

## 2020-09-10 DIAGNOSIS — E11.9 TYPE 2 DIABETES MELLITUS WITHOUT COMPLICATION, WITH LONG-TERM CURRENT USE OF INSULIN: Primary | ICD-10-CM

## 2020-09-10 DIAGNOSIS — I10 ESSENTIAL HYPERTENSION: ICD-10-CM

## 2020-09-10 DIAGNOSIS — G47.33 OSA (OBSTRUCTIVE SLEEP APNEA): ICD-10-CM

## 2020-09-10 DIAGNOSIS — Z79.4 TYPE 2 DIABETES MELLITUS WITHOUT COMPLICATION, WITH LONG-TERM CURRENT USE OF INSULIN: Primary | ICD-10-CM

## 2020-09-10 PROCEDURE — 99999 PR PBB SHADOW E&M-EST. PATIENT-LVL IV: ICD-10-PCS | Mod: PBBFAC,,, | Performed by: INTERNAL MEDICINE

## 2020-09-10 PROCEDURE — 99214 OFFICE O/P EST MOD 30 MIN: CPT | Mod: PBBFAC | Performed by: INTERNAL MEDICINE

## 2020-09-10 PROCEDURE — 99999 PR PBB SHADOW E&M-EST. PATIENT-LVL IV: CPT | Mod: PBBFAC,,, | Performed by: INTERNAL MEDICINE

## 2020-09-10 PROCEDURE — 99203 OFFICE O/P NEW LOW 30 MIN: CPT | Mod: S$PBB,,, | Performed by: INTERNAL MEDICINE

## 2020-09-10 PROCEDURE — 99203 PR OFFICE/OUTPT VISIT, NEW, LEVL III, 30-44 MIN: ICD-10-PCS | Mod: S$PBB,,, | Performed by: INTERNAL MEDICINE

## 2020-09-10 NOTE — PROGRESS NOTES
Subjective:       Patient ID: Lukasz Person is a 65 y.o. male.    Chief Complaint: Sleeping Problem    HPI     I had the pleasure of seeing Lukasz Person today, who is a 65 y.o. male that presents with Obstructive Sleep Apnea.    Lukasz Person does not have a CDL.    Lukasz Person is not a shift worker.    Lukasz Person presents with KUMAR that has been going on for 14 years    I do not have copy of prior studies.   He was getting equipment through [place], where his prior to studies where performed.   He uses nasal pillows.   He feels more rested with therapy.      Bedtime when working ranges from NA to NA.   When not working, bedtime ranges from 2200 to 2300.   Sleep latency ranges from 10 to 15 minutes.     Average number of awakenings is 1-2 and return to sleep is quick.   Wake up time when working is NA to NA.   When not working, wake up time is 0630 to 0700.   Patient does feel rested upon awakening.    Lukasz Person consumes approximately 0 beverages with caffeine daily.   An average of 4 beverages with alcohol are consumed weekly   Medications taken for sleep currently: none  Previous medications taken: none     Lukasz Person does not experience daytime sleepiness.   Naps are taken about 6 times weekly, usually lasting 30 to 120 minutes.  Lukasz currently does operate an automobile.  Lukasz Person does not experience drowsiness when driving.   Patient does doze off when sedentary.   Lukasz Person does not have auxiliary symptoms of narcolepsy including sleep onset paralysis, hypnagogic hallucinations, sleep attacks and cataplexy  ESS 9    Device use 100% of days averaging 8.1 hours.   Device use > 4 hours 100%.   Setting 9 cm H20.   Estimated AHI 1.1    Lukasz Person does not have symptoms of Restless Legs Syndrome. Nocturnal leg movements have not been noticed.   The patient does not experience sleep related leg cramps.   There is not a history of parasomnia.      Current Outpatient Medications:     allopurinoL  (ZYLOPRIM) 100 MG tablet, TAKE 1 TABLET BY MOUTH ONCE DAILY, Disp: 30 tablet, Rfl: 3    fenofibrate 160 MG Tab, TAKE 1 TABLET(160 MG) BY MOUTH EVERY DAY, Disp: 30 tablet, Rfl: 3    glimepiride (AMARYL) 2 MG tablet, TAKE 2 TABLETS BY MOUTH EVERY MORNING, Disp: 60 tablet, Rfl: 3    lisinopriL-hydrochlorothiazide (PRINZIDE,ZESTORETIC) 20-25 mg Tab, TAKE 1 TABLET BY MOUTH EVERY DAY, Disp: 30 tablet, Rfl: 3    metFORMIN (GLUCOPHAGE) 500 MG tablet, TAKE 2 TABLETS BY MOUTH EVERY MORNING AND 2 WITH DINNER, Disp: 120 tablet, Rfl: 0    potassium citrate (UROCIT-K) 10 mEq (1,080 mg) TbSR, TAKE 1 TABLET BY MOUTH TWICE DAILY, Disp: 60 tablet, Rfl: 1    rosuvastatin (CRESTOR) 20 MG tablet, Take 1 tablet (20 mg total) by mouth once daily., Disp: 30 tablet, Rfl: 11     Review of patient's allergies indicates:  No Known Allergies      Past Medical History:   Diagnosis Date    Diabetes mellitus     Onset late 50s/early 60s    Hyperlipidemia     Hypertension     Onset late 50s/early 60s    Sleep apnea     since        Past Surgical History:   Procedure Laterality Date    LITHOTRIPSY      PARATHYROIDECTOMY  2017       Family History   Problem Relation Age of Onset    Heart disease Father 70        CABG    Colon cancer Brother 32       Social History     Socioeconomic History    Marital status:      Spouse name: Not on file    Number of children: Not on file    Years of education: Not on file    Highest education level: Not on file   Occupational History    Not on file   Social Needs    Financial resource strain: Not very hard    Food insecurity     Worry: Never true     Inability: Never true    Transportation needs     Medical: No     Non-medical: No   Tobacco Use    Smoking status: Former Smoker     Packs/day: 1.00     Quit date:      Years since quittin.7    Smokeless tobacco: Never Used   Substance and Sexual Activity    Alcohol use: Yes     Frequency: 2-4 times a month     Drinks  per session: 3 or 4     Binge frequency: Less than monthly     Comment: 3 drinks a week    Drug use: Not on file    Sexual activity: Not on file   Lifestyle    Physical activity     Days per week: 6 days     Minutes per session: 60 min    Stress: Only a little   Relationships    Social connections     Talks on phone: Twice a week     Gets together: Once a week     Attends Worship service: Not on file     Active member of club or organization: No     Attends meetings of clubs or organizations: Never     Relationship status:    Other Topics Concern    Not on file   Social History Narrative    Not on file           Old medical records.    Vitals:    09/10/20 0939   BP: 129/63   Pulse: 65              The patient was given open opportunity to ask questions and/or express concerns about treatment plan.   All questions/concerns were discussed.   Driving precautions were provided.     Two patient identifiers used prior to evaluation.    Thank you for referring Lukasz Person for evaluation.           Review of Systems      Objective:      Physical Exam    Assessment:       1. Type 2 diabetes mellitus without complication, with long-term current use of insulin    2. KUMAR (obstructive sleep apnea)    3. Essential hypertension        Plan:            The pathophysiology of KUMAR was discussed.   The effects of KUMAR on patient's co-morbid conditions and the increased morbidity and/or mortality associated with this condition were reviewed.   The patient was given open opportunity to ask questions and/or express concerns about treatment plan.   All questions/concerns were discussed.   Driving precautions were provided.       Prior records requested to set up with new DME.   Follow up 1 year.     Thank you for referring Lukasz Person for evaluation.       31-minute visit. >50% spent counseling patient and coordination of care.

## 2020-09-10 NOTE — LETTER
September 10, 2020      Pallavi Nick MD  [address]  8th Floor - Cardiology  [place] [address]           [facility]  [address]  [facility] [address]  Phone: [phone]          Patient: Lukasz Person   MR Number: [id number]   YOB: 1955   Date of Visit: 9/10/2020       Dear Dr. Pallavi Nick:    Thank you for referring Lukasz Person to me for evaluation. Attached you will find relevant portions of my assessment and plan of care.    If you have questions, please do not hesitate to call me. I look forward to following Lukasz Person along with you.    Sincerely,    Alla Keys MD    Enclosure  CC:  No Recipients    If you would like to receive this communication electronically, please contact [email or web address] or [phone] to request more information on [employer] Link access.    For providers and/or their staff who would like to refer a patient to [facility], please contact us through our one-stop-shop provider referral line, [facility], at [phone].    If you feel you have received this communication in error or would no longer like to receive these types of communications, please e-mail [email or web address]

## 2020-09-11 ENCOUNTER — OFFICE VISIT (OUTPATIENT)
Dept: INTERNAL MEDICINE | Facility: CLINIC | Age: 65
End: 2020-09-11
Payer: MEDICARE

## 2020-09-11 VITALS
SYSTOLIC BLOOD PRESSURE: 118 MMHG | BODY MASS INDEX: 35.56 KG/M2 | RESPIRATION RATE: 16 BRPM | HEART RATE: 65 BPM | WEIGHT: 262.56 LBS | HEIGHT: 72 IN | DIASTOLIC BLOOD PRESSURE: 76 MMHG | TEMPERATURE: 98 F

## 2020-09-11 DIAGNOSIS — Z79.4 TYPE 2 DIABETES MELLITUS WITHOUT COMPLICATION, WITH LONG-TERM CURRENT USE OF INSULIN: ICD-10-CM

## 2020-09-11 DIAGNOSIS — E11.69 HYPERLIPIDEMIA ASSOCIATED WITH TYPE 2 DIABETES MELLITUS: ICD-10-CM

## 2020-09-11 DIAGNOSIS — E21.0 HYPERPARATHYROIDISM, PRIMARY: Primary | ICD-10-CM

## 2020-09-11 DIAGNOSIS — E11.9 TYPE 2 DIABETES MELLITUS WITHOUT COMPLICATION, WITH LONG-TERM CURRENT USE OF INSULIN: ICD-10-CM

## 2020-09-11 DIAGNOSIS — E78.5 HYPERLIPIDEMIA ASSOCIATED WITH TYPE 2 DIABETES MELLITUS: ICD-10-CM

## 2020-09-11 DIAGNOSIS — I15.2 HYPERTENSION ASSOCIATED WITH DIABETES: ICD-10-CM

## 2020-09-11 DIAGNOSIS — E11.59 HYPERTENSION ASSOCIATED WITH DIABETES: ICD-10-CM

## 2020-09-11 DIAGNOSIS — M10.9 GOUT, UNSPECIFIED CAUSE, UNSPECIFIED CHRONICITY, UNSPECIFIED SITE: ICD-10-CM

## 2020-09-11 DIAGNOSIS — G47.33 OSA (OBSTRUCTIVE SLEEP APNEA): ICD-10-CM

## 2020-09-11 PROCEDURE — 82043 UR ALBUMIN QUANTITATIVE: CPT

## 2020-09-11 PROCEDURE — 99214 PR OFFICE/OUTPT VISIT, EST, LEVL IV, 30-39 MIN: ICD-10-PCS | Mod: S$PBB,,, | Performed by: NURSE PRACTITIONER

## 2020-09-11 PROCEDURE — 99999 PR PBB SHADOW E&M-EST. PATIENT-LVL IV: ICD-10-PCS | Mod: PBBFAC,,, | Performed by: NURSE PRACTITIONER

## 2020-09-11 PROCEDURE — 99999 PR PBB SHADOW E&M-EST. PATIENT-LVL IV: CPT | Mod: PBBFAC,,, | Performed by: NURSE PRACTITIONER

## 2020-09-11 PROCEDURE — 99214 OFFICE O/P EST MOD 30 MIN: CPT | Mod: PBBFAC,PO | Performed by: NURSE PRACTITIONER

## 2020-09-11 PROCEDURE — 99214 OFFICE O/P EST MOD 30 MIN: CPT | Mod: S$PBB,,, | Performed by: NURSE PRACTITIONER

## 2020-09-11 NOTE — PROGRESS NOTES
HPI: Lukasz Person is a 65 y.o.  male c/I for visit to address Diabetes Type 2  This is the first time I am seeing this patient.   Does not have a pcp, but getting established with Dr. Wilson next month.     was diagnosed with T2DM about 10 years ago.   Has never been hospitalized r/t DM.  Taking metformin 1000mg po bid and also on glimepiride 2mg tablets - taking 2 after dinner.   Denies missing doses of DM medication.   Current a1c is controlled at 7.1%.   Coming in today to establish care, and get further recommendations for management of his diabetes.   He is very motivated to lose weight, eat healthier and manage his over all health more effectively.     Past medical History:   Past Medical History:   Diagnosis Date    Diabetes mellitus     Onset late 50s/early 60s    Hyperlipidemia     Hypertension     Onset late 50s/early 60s    Sleep apnea     since 2006      Family hx:   Family History   Problem Relation Age of Onset    Heart disease Father 70        CABG    Colon cancer Brother 32      Current meds:   Current Outpatient Medications:     allopurinoL (ZYLOPRIM) 100 MG tablet, TAKE 1 TABLET BY MOUTH ONCE DAILY, Disp: 30 tablet, Rfl: 3    fenofibrate 160 MG Tab, TAKE 1 TABLET(160 MG) BY MOUTH EVERY DAY, Disp: 30 tablet, Rfl: 3    glimepiride (AMARYL) 2 MG tablet, TAKE 2 TABLETS BY MOUTH EVERY MORNING, Disp: 60 tablet, Rfl: 3    lisinopriL-hydrochlorothiazide (PRINZIDE,ZESTORETIC) 20-25 mg Tab, TAKE 1 TABLET BY MOUTH EVERY DAY, Disp: 30 tablet, Rfl: 3    metFORMIN (GLUCOPHAGE) 500 MG tablet, TAKE 2 TABLETS BY MOUTH EVERY MORNING AND 2 WITH DINNER, Disp: 120 tablet, Rfl: 0    potassium citrate (UROCIT-K) 10 mEq (1,080 mg) TbSR, TAKE 1 TABLET BY MOUTH TWICE DAILY, Disp: 60 tablet, Rfl: 1    aspirin (ECOTRIN) 81 MG EC tablet, Take 1 tablet (81 mg total) by mouth once daily., Disp: 1 tablet, Rfl: 0    rosuvastatin (CRESTOR) 20 MG tablet, Take 1 tablet (20 mg total) by mouth once daily. (Patient not  taking: Reported on 2020), Disp: 30 tablet, Rfl: 11     Current Diabetes medications:   Metformin   Amaryl.     Review of Pertinent co-morbidities/risk factors:   CV: Denies history of MI nor stroke.   CAD: Denies.  Does not take aspirin 81mg tablet daily  BP: has history of HTN  Statin: Taking  ACE/ARB: Taking    Social History     Tobacco Use   Smoking Status Former Smoker    Packs/day: 1.00    Quit date:     Years since quittin.7   Smokeless Tobacco Never Used      Social:   Lives at home with wife.   Life changes/stressors currently: moved from [place] about 1 year ago.   Diet: following ADA diet   Meals: 3 per day and snacks.        Breakfast - eggs, bagel, toast. Cereal with granola, banana.       Lunch - sandwich- turkey or ham       Dinner - tuna fish, stir garcia vegetables with shrimp, orka, lynn peppers, steak        Snacks - chips, crackers         Drinks - tea, water  Exercise: not currently, trying to get back into walking - used to walk 3 miles per day.   Activities: retired.     Glucose Monitoring:   Is not checking sugars    Standards of care:   Eyes: .: 2020  Foot exam: : 2020   Diabetes education: None.    Vital Signs  /76 (BP Location: Left arm, Patient Position: Sitting, BP Method: Medium (Manual))   Pulse 65   Temp 97.6 °F (36.4 °C) (Temporal)   Resp 16   Ht 6' (1.829 m)   Wt 119.1 kg (262 lb 9.1 oz)   BMI 35.61 kg/m²     Pertinent Labs:   Hgba1c   Lab Results   Component Value Date    HGBA1C 7.1 (H) 2020     Lipid panel   Lab Results   Component Value Date    CHOL 199 2020     Lab Results   Component Value Date    HDL 42 2020     Lab Results   Component Value Date    LDLCALC 108.8 2020     Lab Results   Component Value Date    TRIG 241 (H) 2020     Lab Results   Component Value Date    CHOLHDL 21.1 2020      CMP  Glucose   Date Value Ref Range Status   2020 137 (H) 70 - 110 mg/dL Final     BUN, Bld   Date Value Ref  Range Status   08/20/2020 21 8 - 23 mg/dL Final     Creatinine   Date Value Ref Range Status   08/20/2020 1.6 (H) 0.5 - 1.4 mg/dL Final     eGFR if    Date Value Ref Range Status   08/20/2020 51.1 (A) >60 mL/min/1.73 m^2 Final     eGFR if non    Date Value Ref Range Status   08/20/2020 44.2 (A) >60 mL/min/1.73 m^2 Final     Comment:     Calculation used to obtain the estimated glomerular filtration  rate (eGFR) is the CKD-EPI equation.         Microalbumin creatinine ratio:   Lab Results   Component Value Date    MICALBCREAT 14.7 09/11/2020       Review Of Systems:   Gen: Appetite good, no weight gain or loss, denies fatigue and weakness. Denies polydipsia.  Skin: Skin is intact and heals well, denies any rashes or hair changes.   Eyes: Denies any acute visual disturbances, nor blurred vision.   Resp: Denies SOB or Dyspnea on exertion, denies cough.   Cardiac: Denies chest pain, palpitations, or swelling.   GI: Denies abdominal pain, nausea or vomiting, diarrhea, or constipation.   /GYN: Denies nocturia, nor burning, frequency or pain on urination.  MS/Neuro: Denies numbness/ tingling in BLE; Gait steady, speech clear - denies headaches.   Psych: Denies drug/ETOH abuse, no hx of depression.  Other systems: negative.    Physical Exam:   GENERAL: Well developed, well nourished in appearance.   PSYCH: AAOx3, appropriate mood and affect, pleasant expression, conversant, appears relaxed, well groomed.   EYES: PERRL, Conjunctiva and corneas clear  NECK: Soft and Supple, trachea midline, No thyroid enlargement noted/palpable. No JVD. No carotid bruits auscultated.  CHEST: Even, regular, and unlabored respirations  ABDOMEN: Soft, non-tender, non-distended   VASCULAR: pedal pulses palpable bilaterally, no edema.  NEURO:  cranial nerves II - XII intact   MUSCULOSKELETAL: Good ROM, steady gait.   SKIN: Skin warm, dry, and intact - no acanthosis nigracans nor skin tags noted.   FEET: Pedal  pulses palpable and intact.     Assessment and Plan of Care:     Lukasz was seen today for diabetes.    Diagnoses and all orders for this visit:    Hyperparathyroidism, primary  -     PTH, intact; Future    Type 2 diabetes mellitus without complication, with long-term current use of insulin  -     Microalbumin/creatinine urine ratio  -     Hemoglobin A1C; Future  -     Microalbumin/creatinine urine ratio; Future    Gout, unspecified cause, unspecified chronicity, unspecified site  -     Uric acid; Future  -     Comprehensive metabolic panel; Future    KUMAR (obstructive sleep apnea)    Hyperlipidemia associated with type 2 diabetes mellitus  -     Lipid Panel; Future    Hypertension associated with diabetes     1. T2DM with hyperglycemia- Hgba1c goal is 7.5% or less without hypoglycemia - at 7.1%. meets goal.   For now:   Continue metformin 1000mg po bid.   Change the amaryl from after dinner to taking with breakfast instead. This should help bring down the breakfast and lunch time bg's.   He was taking the amaryl after dinner, which was wrong (could bring down sugars too low at bedtime) - advised should be taken with a  Meal earlier in the day.   discussed DM, progression of disease, long term complications, CV risk factors and tx options.   Advise compliance with ADA diet and encourage exercise- gave him a dietary plan/handout/food list.   Eyes - reported recent exam - outside provider - will ask for/obtain those records.     2. HTN- controlled, continue meds as previously prescribed and monitor.   Lisinopril/hctz - get new Urine MAC.   Addendum - 9/14/2020 - resulted urine mac wnl. Stable.     3. Hyperlipidemia - LDL goal < 100. Just was told his cholesterol was high,   and began him on crestor 20mg every night.   He hasn't started taking it yet, but I advised him to begin now and explained CV risk factors as they relate to diabetes.     4. Weight - BMI Body mass index is 35.61 kg/m².   Encourage Ada diet and  exercise.     5. Gout - on allopurinol 100mg daily - no recent flare ups.   Get new uric acid level at baseline.     6. CKD - stage 2 - 3 - will monitor trends -   Also reports history of frequent kidney stones. Nothing recent, should be resolved since overactive parathyroid gland removed.   Get urine MAC.     7. Hyperparathyroidism - s/p removal of 1 gland - had been found incidentally when he kept having recurring kidney stones.   Should be resolved now - will get new PTH level to re-assess.     8. KUMAR - managed - had sleep MD appt. Yesterday, wearing cpap at night no issues.      9. History of kidney stones - has been thought secondary to the hyperparathyroidism.   Is taking potassium -citrate to prevent. I suggested to call his urologist if he would like to stop, as his over-active parathyroid has been removed.       Follow up in 3 months with OV and labs prior

## 2020-09-11 NOTE — PATIENT INSTRUCTIONS
"Continue metformin 1000mg with food twice daily.     Continue glimiperide 2mg tabletes - 2 of them with either breakfast or lunch.     Start crestor every night (you may take 1/2 tablet nightly to begin with).     Labs - uric acid and PTH hormone.   Checking urine microalbumin level today.     Low Carb Snacks & Diabetes friendly foods   Aim for 30 grams of carbs for 3 meals per day (or 2 meals and  1 - 2 snacks - however you prefer)  Snacks can be an important part of a balanced, healthy meal plan.   They allow you to eat more frequently, feeling full and satisfied throughout the day.   Also, they allow you to spread carbohydrates evenly, which may stabilize blood sugars.  Plus, snacks are enjoyable!   The amount of carbohydrate needed at snacks varies. Generally, about 15 grams of carbohydrate per snack is recommended.    Below you will find some tasty treats.       0-5 gm carb   Crystal Light flavoring (I like fruit punch flavor!)   Vitamin Water Zero   Gwen antioxidant drinks.    Herbal tea, unsweetened   2 tsp peanut butter on celery or carrots   Caleb's original Candies - (the Sugar Free ones!)   1/2 cup sugar-free jell-o   1 sugar-free popsicle   ¼ cup blueberries or strawberries   8oz Blue Jinny unsweetened almond milk (or there is sugar free vanilla flavored as well).   5 baby carrots & celery sticks, cucumbers, bell peppers dipped in ¼ cup salsa, 2Tbsp light ranch dressing or 2Tbsp plain Greek yogurt   10 Goldfish crackers   ½ oz low-fat cheese or string cheese   1 closed handful of nuts, unsalted (example-->almonds, pistachios, cashews, peanuts, etc).   1 Tbsp of sunflower seeds, unsalted   1 cup Smart Pop popcorn   1 whole grain brown rice cake    "Think Thin" protein bars - my personal favorite is Creamy Peanut butter, chocolate brownie, or oreo flavors.   Quest bars (my favorite is birthday cake, and also cinnamon roll is good melted for 10 seconds in the microwave - please remove " "the wrapper first!)   Premier protein shakes - sold at [employer], or other brand alternatives - usually 1 - 2 grams of carbs (strawberry, vanilla, chocolate flavors) Coffee flavor is my new morning favorite!    Scrambled eggs! Or a fried egg or boiled eggs - add sliced tomatoes, cilantro or some chopped green onions.    Eggs are good with any and all veggies! You can even eat them for dinner or in a low carb tortilla, or served with your favorite grilled meat/sausage or perry is my favorite.        15 gm carb   1 small piece of fruit or ½ banana or 1/2 cup lite canned fruit   3 miguelangel cracker squares   3 cups Smart Pop popcorn, top spray butter, Nuno lite salt or cinnamon and Truvia   5 Vanilla Wafers   ½ cup low fat, no added sugar ice cream or frozen yogurt (Blue bell, Blue Bunny, Weight Watchers, Skinny Cow)   ½ turkey, ham, or chicken sandwich   ½ c fruit with ½ c Cottage cheese   4-6 unsalted wheat crackers with 1 oz low fat cheese or 1 tbsp peanut butter    30-45 goldfish crackers (depending on flavor)    7-8 Restoration mini brown rice cakes (caramel, apple cinnamon, chocolate)    12 Restoration mini brown rice cakes (cheddar, bbq, ranch)    1/3 cup hummus dip with raw veg   1/2 whole wheat murtaza, 1Tbsp hummus   Mini Pizza (1/2 whole wheat English muffin, low-fat  cheese, tomato sauce)   100 calorie snack pack (Oreo, Chips Ahoy, Ritz Mix, Baked Cheetos)   4-6 oz. light or Greek Style yogurt (Chobani, Yoplait, Okios, [place]oneyfield)   ½ cup sugar-free pudding     6 in. wheat tortilla or murtaza oven toasted chips (topped with spray butter flavoring, cinnamon, Truvia OR spray butter, garlic powder, chili powder)    18 BBQ Popchips (available at [employer], [employer], [employer])   Mini bagel (small size) toasted - add fat free cream cheese or avocado and sliced tomato on top - yum!    1/2 cup Halo top icecream - birthday cake is my go-to flavor.     Ashok Baez - I'm often asked "what smoothie is " "healthy for me".   Do not be decieved to think that all smoothies are "healthy". In fact, most are loaded with hidden sugars.   Here are some from the menu that you are allowed to have being diabetic:   The gladiator - any flavor.   Keto champ (berry, chocolate or coffee - all have 10 grams of carbs).   The Lean 1 smoothies all have 15 - 20 grams of carbs, so this is slightly more. But would be ok for a meal substitute or snack.   The Shredder in Vanilla or chocolate only. (the strawberry has more sugar considerably)    Tips and Tricks:   My favorite "secret" seasoning to make things extra yummy is cinnamon for sweet taste   For an added secret "salty" taste - add "everything but the bagel" seasoning (you can get on amazon.com or at  joes. I have seen at local groceries such as [employer] too!).     Dark colored fruits are your friend -- blueberries, strawberries, raspberries, blackberries. They have a lower sugar content. So will be the best choice for you.   Light colored fruits are NOT your friend - they tend to be higher in sugar and are not the best options for an ADA diet - examples are oranges, bananas, peaches, watermelon, -- you can eat these, but carefully and in moderation.     WATER. I cannot emphasize drinking water enough - it will hydrate you, make you full. Your body needs it - it's a natural appetite suppressant.   Sometimes hunger and thirst can feel the same. Try drinking some water first, then eat if you are still hungry.     Other snack choices    Celery with peanut butter   Celery with tuna salad   Dill pickles and cheddar cheese (no kidding, it's a great combo)   Nuts (keep raw ones in the freezer if you think you'll overeat them)   Sunflower seeds (get them in the shell so it will take longer to eat them)   Other seeds (How to Toast Pumpkin or Squash Seeds)    Pistachios or almonds - will fill you up and are tasty!    Low-Carb Trail Mix   Jerky (beef or turkey -- try to find " "low-sugar varieties)   Salami slices (you can find in the deli section)   Cheese sticks, such as string cheese   Sugar-free Jello, alone or with cottage cheese and a sprinkling of nuts. Make sugar-free lime Jello with part coconut milk -- For a large package, dissolve the powder in a cup of boiling water, add a can of coconut milk, and then add the rest of the water. Stir well.   Pepperoni "chips" -- Zap the slices in the microwave   Cheese with a few apple slices   4-ounce plain or sugar-free yogurt with berries and flax seed meal   Smoked salmon and cream cheese on cucumber slices   Lettuce Roll-ups -- Roll luncheon meat, egg salad, tuna or other filling and veggies in lettuce leaves   Lunch Meat Roll-ups -- Roll cheese or veggies in lunch meat (read the labels for carbs on the lunch meat)   Spread bean dip, spinach dip, or other low-carb dip or spread on the lunch meat or lettuce and then roll it up   Raw veggies and spinach dip, or other low-carb dip   Pork rinds (Chicharrón), with or without dip   Ricotta cheese with fruit and/or nuts and/or flax seed meal   Mushrooms with cheese spread inside (or other spreads or dips)   Low-carb snack bars (watch out for sugar alcohols, especially maltitol)   Product Review: Atkins Advantage Bars   Pepperoni Chips -- Microwave pepperoni slices until crisp. Great with cheeses and dips   Garlic Parmesan Flax Seed Crackers   Parmesan Crisps -- Good when you want a crunchy snack.   Peanut Butter Protein Balls      "

## 2020-09-12 LAB
ALBUMIN/CREAT UR: 14.7 UG/MG (ref 0–30)
CREAT UR-MCNC: 143 MG/DL (ref 23–375)
MICROALBUMIN UR DL<=1MG/L-MCNC: 21 UG/ML

## 2020-09-13 RX ORDER — ASPIRIN 81 MG/1
81 TABLET ORAL DAILY
Qty: 1 TABLET | Refills: 0
Start: 2020-09-13 | End: 2021-06-22

## 2020-09-14 ENCOUNTER — HOSPITAL ENCOUNTER (OUTPATIENT)
Dept: CARDIOLOGY | Facility: HOSPITAL | Age: 65
Discharge: HOME | End: 2020-09-14
Attending: INTERNAL MEDICINE
Payer: MEDICARE

## 2020-09-14 ENCOUNTER — TELEPHONE (OUTPATIENT)
Dept: PRIMARY CARE CLINIC | Facility: CLINIC | Age: 65
End: 2020-09-14

## 2020-09-14 ENCOUNTER — TELEPHONE (OUTPATIENT)
Dept: INTERNAL MEDICINE | Facility: CLINIC | Age: 65
End: 2020-09-14

## 2020-09-14 VITALS — HEIGHT: 72 IN | WEIGHT: 262 LBS | BODY MASS INDEX: 35.49 KG/M2

## 2020-09-14 DIAGNOSIS — I20.89 ANGINA OF EFFORT: ICD-10-CM

## 2020-09-14 PROBLEM — I15.2 HYPERTENSION ASSOCIATED WITH DIABETES: Status: ACTIVE | Noted: 2020-09-14

## 2020-09-14 PROBLEM — I10 ESSENTIAL HYPERTENSION: Status: RESOLVED | Noted: 2020-08-22 | Resolved: 2020-09-14

## 2020-09-14 PROBLEM — E78.5 HYPERLIPIDEMIA ASSOCIATED WITH TYPE 2 DIABETES MELLITUS: Status: ACTIVE | Noted: 2020-09-14

## 2020-09-14 PROBLEM — E11.69 HYPERLIPIDEMIA ASSOCIATED WITH TYPE 2 DIABETES MELLITUS: Status: ACTIVE | Noted: 2020-09-14

## 2020-09-14 PROBLEM — E11.59 HYPERTENSION ASSOCIATED WITH DIABETES: Status: ACTIVE | Noted: 2020-09-14

## 2020-09-14 LAB
CFR FLOW - ANTERIOR: 3.14
CFR FLOW - INFERIOR: 2.86
CFR FLOW - LATERAL: 2.96
CFR FLOW - MAX: 4.13
CFR FLOW - MIN: 1.71
CFR FLOW - SEPTAL: 2.79
CFR FLOW - WHOLE HEART: 2.94
CV PHARM DOSE: 60 MG
CV STRESS BASE HR: 61 BPM
DIASTOLIC BLOOD PRESSURE: 49 MMHG
END DIASTOLIC INDEX-HIGH: 170 ML/M2
END SYSTOLIC INDEX-HIGH: 70 ML/M2
NUC REST DIASTOLIC VOLUME INDEX: 120
NUC REST EJECTION FRACTION: 57
NUC REST SYSTOLIC VOLUME INDEX: 52
NUC STRESS DIASTOLIC VOLUME INDEX: 137
NUC STRESS EJECTION FRACTION: 79 %
NUC STRESS SYSTOLIC VOLUME INDEX: 28
OHS CV CPX 85 PERCENT MAX PREDICTED HEART RATE MALE: 131
OHS CV CPX MAX PREDICTED HEART RATE: 154
OHS CV CPX PATIENT IS FEMALE: 0
OHS CV CPX PATIENT IS MALE: 1
OHS CV CPX PEAK DIASTOLIC BLOOD PRESSURE: 51 MMHG
OHS CV CPX PEAK HEAR RATE: 70 BPM
OHS CV CPX PEAK RATE PRESSURE PRODUCT: 9730
OHS CV CPX PEAK SYSTOLIC BLOOD PRESSURE: 139 MMHG
OHS CV CPX PERCENT MAX PREDICTED HEART RATE ACHIEVED: 45
OHS CV CPX RATE PRESSURE PRODUCT PRESENTING: 9211
REST FLOW - ANTERIOR: 0.6 CC/MIN/G
REST FLOW - INFERIOR: 0.54 CC/MIN/G
REST FLOW - LATERAL: 0.68 CC/MIN/G
REST FLOW - MAX: 0.79 CC/MIN/G
REST FLOW - MIN: 0.25 CC/MIN/G
REST FLOW - SEPTAL: 0.53 CC/MIN/G
REST FLOW - WHOLE HEART: 0.59 CC/MIN/G
RETIRED EF AND QEF - SEE NOTES: 51 %
STRESS FLOW - ANTERIOR: 1.89 CC/MIN/G
STRESS FLOW - INFERIOR: 1.52 CC/MIN/G
STRESS FLOW - LATERAL: 2.03 CC/MIN/G
STRESS FLOW - MAX: 2.52 CC/MIN/G
STRESS FLOW - MIN: 0.51 CC/MIN/G
STRESS FLOW - SEPTAL: 1.47 CC/MIN/G
STRESS FLOW - WHOLE HEART: 1.73 CC/MIN/G
SYSTOLIC BLOOD PRESSURE: 151 MMHG

## 2020-09-14 PROCEDURE — 78434 CARDIAC PET SCAN STRESS (CUPID ONLY): ICD-10-PCS | Mod: ,,, | Performed by: INTERNAL MEDICINE

## 2020-09-14 PROCEDURE — 78434 AQMBF PET REST & RX STRESS: CPT | Mod: ,,, | Performed by: INTERNAL MEDICINE

## 2020-09-14 PROCEDURE — 93016 CV STRESS TEST SUPVJ ONLY: CPT | Mod: ,,, | Performed by: INTERNAL MEDICINE

## 2020-09-14 PROCEDURE — 93016 CARDIAC PET SCAN STRESS (CUPID ONLY): ICD-10-PCS | Mod: ,,, | Performed by: INTERNAL MEDICINE

## 2020-09-14 PROCEDURE — 78492 MYOCRD IMG PET MLT RST&STRS: CPT | Mod: 26,,, | Performed by: INTERNAL MEDICINE

## 2020-09-14 PROCEDURE — 93018 CV STRESS TEST I&R ONLY: CPT | Mod: ,,, | Performed by: INTERNAL MEDICINE

## 2020-09-14 PROCEDURE — 78492 CARDIAC PET SCAN STRESS (CUPID ONLY): ICD-10-PCS | Mod: 26,,, | Performed by: INTERNAL MEDICINE

## 2020-09-14 PROCEDURE — 93018 CARDIAC PET SCAN STRESS (CUPID ONLY): ICD-10-PCS | Mod: ,,, | Performed by: INTERNAL MEDICINE

## 2020-09-14 PROCEDURE — 78434 AQMBF PET REST & RX STRESS: CPT

## 2020-09-14 RX ORDER — DIPYRIDAMOLE 5 MG/ML
60 INJECTION INTRAVENOUS ONCE
Status: COMPLETED | OUTPATIENT
Start: 2020-09-14 | End: 2020-09-14

## 2020-09-14 RX ADMIN — DIPYRIDAMOLE 60 MG: 5 INJECTION INTRAVENOUS at 09:09

## 2020-09-14 NOTE — TELEPHONE ENCOUNTER
----- Message from Maria Esther Palacios NP sent at 9/13/2020  8:02 PM CDT -----  Call patient, there is no protein in urine. This is normal/good thing. Thanks, Maria Esther

## 2020-09-14 NOTE — TELEPHONE ENCOUNTER
----- Message from Manjula Fox sent at 9/14/2020  3:53 PM CDT -----  Contact: Patient [phone]  Requesting to speak with you regarding the lab appt that was cancelled. Also would like to speak with you regarding notes on the after visit summary.    Please call and advise.    Thank You

## 2020-09-14 NOTE — PROGRESS NOTES
REASON FOR CONSULT/CHIEF COMPLAIN:  H/o Chronic kidney disease stage 3    REFERRING PHYSICIAN: Pallavi Nick    This is a new clinic patient to me .      HISTORY OF PRESENT ILLNESS: 65 y.o. male who is new to me  has a past medical history of Diabetes mellitus, Hyperlipidemia, Hypertension, and Sleep apnea. H/o kidney stones in the past, hyperparathyroidism s/p parathyroidectomy, gout, KUMAR referred here for abnormal renal function with creatinine at 1.6 and CKD stage 3 A. Pt has been following with Nephrologist at [place] , mentioned has calcium oxalate stones in the past and since his parathyroidectomy ( one of 4 glands removed), and being on potassium citrate he never had any episode since past 3 years. Pt has been compliant with drinking good amount of water, 64 ounces per day.Has been Diabetic since past 10 years  And well controlled. Recent urine micro albumin checked was with in normal limits. Pt already on ACE for his blood pressure. Also had elevated uric acid for which he is on allopurinol.     Today he mentions of not taking his blood pressure pills this AM and his pressure is slightly high.   No chronic NSAID use, no known exposure to lithium, lead.  Denied any issues with urination including dysuria, hematuria, urgency, hesitancy, nocturia, incomplete voiding. Denied chest pain, nausea, vomiting, abd pain, nausea, vomiting, diarrhea, shortness of breath, pedal edema, Orthopnea, PND.  Home Blood pressures are checked/not checked and stay around  130 to 135 systolic and 80 diastolic   Home Blood sugars are checked/not checked and well controlled.    ROS:  General: negative for chills, or fatigue  ENT: No epistaxis or headaches  Hematological and Lymphatic: No bleeding problems or blood clots.  Endocrine: No skin changes or temperature intolerance  Respiratory: No cough, shortness of breath, or wheezing  Cardiovascular: No chest pain or dyspnea   Gastrointestinal: No abdominal pain, change in bowel  habits  Genito-Urinary: No dysuria, trouble voiding, or hematuria  Musculoskeletal: ROS: negative for - joint pain, joint stiffness, joint swelling, muscle pain or muscular weakness  Neurological: No new focal weakness, no numbness  Dermatological: No rash or ulcers.    PAST MEDICAL HISTORY:  Past Medical History:   Diagnosis Date    Diabetes mellitus     Onset late 50s/early 60s    Hyperlipidemia     Hypertension     Onset late 50s/early 60s    Sleep apnea     since        PAST SURGICAL HISTORY:  Past Surgical History:   Procedure Laterality Date    LITHOTRIPSY      PARATHYROIDECTOMY  -107       FAMILY HISTORY:   Family History   Problem Relation Age of Onset    Heart disease Father 70        CABG    Colon cancer Brother 32       SOCIAL HISTORY:  Social History     Socioeconomic History    Marital status:      Spouse name: Not on file    Number of children: Not on file    Years of education: Not on file    Highest education level: Not on file   Occupational History    Not on file   Social Needs    Financial resource strain: Not very hard    Food insecurity     Worry: Never true     Inability: Never true    Transportation needs     Medical: No     Non-medical: No   Tobacco Use    Smoking status: Former Smoker     Packs/day: 1.00     Quit date:      Years since quittin.7    Smokeless tobacco: Never Used   Substance and Sexual Activity    Alcohol use: Yes     Frequency: 2-4 times a month     Drinks per session: 3 or 4     Binge frequency: Less than monthly     Comment: 3 drinks a week    Drug use: Not on file    Sexual activity: Not on file   Lifestyle    Physical activity     Days per week: 6 days     Minutes per session: 60 min    Stress: Only a little   Relationships    Social connections     Talks on phone: Twice a week     Gets together: Once a week     Attends Spiritism service: Not on file     Active member of club or organization: No     Attends meetings of  clubs or organizations: Never     Relationship status:    Other Topics Concern    Not on file   Social History Narrative    Not on file       ALLERGIES:  Review of patient's allergies indicates:  No Known Allergies    MEDICATIONS:    Current Outpatient Medications:     allopurinoL (ZYLOPRIM) 100 MG tablet, TAKE 1 TABLET BY MOUTH ONCE DAILY, Disp: 30 tablet, Rfl: 3    aspirin (ECOTRIN) 81 MG EC tablet, Take 1 tablet (81 mg total) by mouth once daily., Disp: 1 tablet, Rfl: 0    fenofibrate 160 MG Tab, TAKE 1 TABLET(160 MG) BY MOUTH EVERY DAY, Disp: 30 tablet, Rfl: 3    glimepiride (AMARYL) 2 MG tablet, TAKE 2 TABLETS BY MOUTH EVERY MORNING, Disp: 60 tablet, Rfl: 3    lisinopriL-hydrochlorothiazide (PRINZIDE,ZESTORETIC) 20-25 mg Tab, TAKE 1 TABLET BY MOUTH EVERY DAY, Disp: 30 tablet, Rfl: 3    metFORMIN (GLUCOPHAGE) 500 MG tablet, TAKE 2 TABLETS BY MOUTH EVERY MORNING AND 2 WITH DINNER, Disp: 120 tablet, Rfl: 0    potassium citrate (UROCIT-K) 10 mEq (1,080 mg) TbSR, TAKE 1 TABLET BY MOUTH TWICE DAILY, Disp: 60 tablet, Rfl: 1    rosuvastatin (CRESTOR) 20 MG tablet, Take 1 tablet (20 mg total) by mouth once daily. (Patient not taking: Reported on 9/11/2020), Disp: 30 tablet, Rfl: 11  No current facility-administered medications for this visit.    Medication List with Changes/Refills   Current Medications    ALLOPURINOL (ZYLOPRIM) 100 MG TABLET    TAKE 1 TABLET BY MOUTH ONCE DAILY    ASPIRIN (ECOTRIN) 81 MG EC TABLET    Take 1 tablet (81 mg total) by mouth once daily.    FENOFIBRATE 160 MG TAB    TAKE 1 TABLET(160 MG) BY MOUTH EVERY DAY    GLIMEPIRIDE (AMARYL) 2 MG TABLET    TAKE 2 TABLETS BY MOUTH EVERY MORNING    LISINOPRIL-HYDROCHLOROTHIAZIDE (PRINZIDE,ZESTORETIC) 20-25 MG TAB    TAKE 1 TABLET BY MOUTH EVERY DAY    METFORMIN (GLUCOPHAGE) 500 MG TABLET    TAKE 2 TABLETS BY MOUTH EVERY MORNING AND 2 WITH DINNER    POTASSIUM CITRATE (UROCIT-K) 10 MEQ (1,080 MG) TBSR    TAKE 1 TABLET BY MOUTH TWICE DAILY     ROSUVASTATIN (CRESTOR) 20 MG TABLET    Take 1 tablet (20 mg total) by mouth once daily.        PHYSICAL EXAM:  There were no vitals taken for this visit.    General: No distress,  Moderate build ,No fever or chills  Head: Normocephalic,atraumatic  Eyes: conjunctivae/corneas clear. PERRL, EOM's intact.  Nose: Nares normal. Mucosa normal. No drainage or sinus tenderness.  Neck: No adenopathy,no carotid bruit,no JVD  Lungs:Clear to auscultation bilaterally, No Crackles  Heart: Regular rate and rhythm, no murmur, gallops or rubs  Abdomen: Soft, no tenderness, bowel sounds normal  Extremities: Atraumatic, no edema in LE  Skin: Turgor normal. No rashes or ulcers  Neurologic: No focal weakness, oriented.  Dialysis Access: none         LABS:  Lab Results   Component Value Date    HGB 13.1 (L) 08/20/2020        Lab Results   Component Value Date    CREATININE 1.6 (H) 08/20/2020       No results found for: UTPCR    Lab Results   Component Value Date     08/20/2020    K 4.3 08/20/2020    CO2 27 08/20/2020       last PTH   Lab Results   Component Value Date    CALCIUM 9.9 08/20/2020       Lab Results   Component Value Date    HGBA1C 7.1 (H) 08/20/2020       Lab Results   Component Value Date    LDLCALC 108.8 03/04/2020         Creatinine, Random Ur   Date Value Ref Range Status   09/11/2020 143.0 23.0 - 375.0 mg/dL Final     Comment:     The random urine reference ranges provided were established   for 24 hour urine collections.  No reference ranges exist for  random urine specimens.  Correlate clinically.         No results found for: PROTEINURINE    No results found for: UTPCR       Problem List    Chronic kidney disease stage 3A  Diabetes Mellitus   Essential hypertension  Hyperlipidemia  Gout  H/o kidney stones   S/p parathyroidectomy  KUMAR    ASSESSMENT and Plan      CKD stage 3 A  -Baseline Creatinine is around 1.5 to 1.6  Most likely etiology for CKD is  DM, HTN, arterial atherosclerosis  Urine micro albumin to  creatinine ratio is with in limits   Electrolytes, Acid Base, Hemoglobin, Bone Mineral in acceptable range.  U/A,  renal function panel, uric acid, urine protein to creatinine, PTH   Ordered  -Avoid NSAIDs intake    Diabetes Mellitus   Continue current medications      Essential Hypertension   Blood pressures elevated at clinic , m,entioned of not taking his pill this AM   Usually are well controlled   Encouraged home Bp monitor    -Continue current BP meds regimen  -Avoid NSAIDs intake     H/o kidney stones    S/p Parathyroidectomy    Continue Potassium citrate      H/o Gout   On allopurinol   No arthritis   Will check Uric acid levels .    - General risk factors for kidney stones and the conservative measures to prevent kidney stones in the future were discussed with the patient in detail.  The patient was encouraged to drink 2-3 liters of water a day, limit iced tea and pasquale as well as foods high in oxalate.  They were cautioned to try to limit salt and red meat intake.  We also discussed adding citrate to the diet with the addition of atul or lemon juice to their water or alternatively with crystal light.     RTC in 2 weeks     Diagnosis and plan of care explained to the patient.  Pt Verbalized understanding. Answered all questions.  Thanks for allowing me to participate in the care of this patient.       Karol Oconnell MD  Nephrology  [facility].

## 2020-09-15 ENCOUNTER — OFFICE VISIT (OUTPATIENT)
Dept: INTERNAL MEDICINE | Facility: CLINIC | Age: 65
End: 2020-09-15
Payer: MEDICARE

## 2020-09-15 ENCOUNTER — OFFICE VISIT (OUTPATIENT)
Dept: NEPHROLOGY | Facility: CLINIC | Age: 65
End: 2020-09-15
Payer: MEDICARE

## 2020-09-15 VITALS
SYSTOLIC BLOOD PRESSURE: 130 MMHG | HEART RATE: 66 BPM | BODY MASS INDEX: 35.83 KG/M2 | WEIGHT: 264.56 LBS | DIASTOLIC BLOOD PRESSURE: 68 MMHG | RESPIRATION RATE: 15 BRPM | HEIGHT: 72 IN | OXYGEN SATURATION: 98 %

## 2020-09-15 VITALS
HEART RATE: 63 BPM | BODY MASS INDEX: 35.38 KG/M2 | HEIGHT: 72 IN | WEIGHT: 261.25 LBS | OXYGEN SATURATION: 98 % | SYSTOLIC BLOOD PRESSURE: 170 MMHG | DIASTOLIC BLOOD PRESSURE: 72 MMHG

## 2020-09-15 DIAGNOSIS — E21.0 HYPERPARATHYROIDISM, PRIMARY: ICD-10-CM

## 2020-09-15 DIAGNOSIS — E11.65 UNCONTROLLED TYPE 2 DIABETES MELLITUS WITH HYPERGLYCEMIA: ICD-10-CM

## 2020-09-15 DIAGNOSIS — Z01.20 DENTAL EXAMINATION: ICD-10-CM

## 2020-09-15 DIAGNOSIS — M10.00 IDIOPATHIC GOUT, UNSPECIFIED CHRONICITY, UNSPECIFIED SITE: ICD-10-CM

## 2020-09-15 DIAGNOSIS — E11.22 TYPE 2 DIABETES MELLITUS WITH STAGE 3 CHRONIC KIDNEY DISEASE, WITHOUT LONG-TERM CURRENT USE OF INSULIN: ICD-10-CM

## 2020-09-15 DIAGNOSIS — E11.59 HYPERTENSION ASSOCIATED WITH DIABETES: ICD-10-CM

## 2020-09-15 DIAGNOSIS — E11.59 OBESITY, DIABETES, AND HYPERTENSION SYNDROME: ICD-10-CM

## 2020-09-15 DIAGNOSIS — Z11.59 NEED FOR HEPATITIS C SCREENING TEST: ICD-10-CM

## 2020-09-15 DIAGNOSIS — E78.5 HYPERLIPIDEMIA ASSOCIATED WITH TYPE 2 DIABETES MELLITUS: ICD-10-CM

## 2020-09-15 DIAGNOSIS — N18.30 TYPE 2 DIABETES MELLITUS WITH STAGE 3 CHRONIC KIDNEY DISEASE, WITHOUT LONG-TERM CURRENT USE OF INSULIN: ICD-10-CM

## 2020-09-15 DIAGNOSIS — E11.9 DM TYPE 2 WITHOUT RETINOPATHY: ICD-10-CM

## 2020-09-15 DIAGNOSIS — E66.01 SEVERE OBESITY (BMI 35.0-35.9 WITH COMORBIDITY): ICD-10-CM

## 2020-09-15 DIAGNOSIS — G47.33 OSA ON CPAP: ICD-10-CM

## 2020-09-15 DIAGNOSIS — Z00.00 ENCOUNTER FOR PREVENTIVE HEALTH EXAMINATION: Primary | ICD-10-CM

## 2020-09-15 DIAGNOSIS — N18.30 CKD (CHRONIC KIDNEY DISEASE) STAGE 3, GFR 30-59 ML/MIN: ICD-10-CM

## 2020-09-15 DIAGNOSIS — E11.69 OBESITY, DIABETES, AND HYPERTENSION SYNDROME: ICD-10-CM

## 2020-09-15 DIAGNOSIS — E29.1 MALE HYPOGONADISM: ICD-10-CM

## 2020-09-15 DIAGNOSIS — E78.1 HYPERTRIGLYCERIDEMIA: ICD-10-CM

## 2020-09-15 DIAGNOSIS — E66.9 OBESITY, DIABETES, AND HYPERTENSION SYNDROME: ICD-10-CM

## 2020-09-15 DIAGNOSIS — E11.69 HYPERLIPIDEMIA ASSOCIATED WITH TYPE 2 DIABETES MELLITUS: ICD-10-CM

## 2020-09-15 DIAGNOSIS — Z13.6 SCREENING FOR AAA (ABDOMINAL AORTIC ANEURYSM): ICD-10-CM

## 2020-09-15 DIAGNOSIS — E11.36 DIABETES MELLITUS WITH CATARACT: ICD-10-CM

## 2020-09-15 DIAGNOSIS — I15.2 OBESITY, DIABETES, AND HYPERTENSION SYNDROME: ICD-10-CM

## 2020-09-15 DIAGNOSIS — Z87.891 EX-SMOKER: ICD-10-CM

## 2020-09-15 DIAGNOSIS — I15.2 HYPERTENSION ASSOCIATED WITH DIABETES: ICD-10-CM

## 2020-09-15 PROBLEM — E11.29 TYPE 2 DIABETES MELLITUS WITH KIDNEY COMPLICATION, WITHOUT LONG-TERM CURRENT USE OF INSULIN: Status: ACTIVE | Noted: 2020-08-22

## 2020-09-15 PROBLEM — I20.89 ANGINA OF EFFORT: Status: RESOLVED | Noted: 2020-09-08 | Resolved: 2020-09-15

## 2020-09-15 PROCEDURE — 99999 PR PBB SHADOW E&M-EST. PATIENT-LVL V: CPT | Mod: PBBFAC,,, | Performed by: NURSE PRACTITIONER

## 2020-09-15 PROCEDURE — G0439 PR MEDICARE ANNUAL WELLNESS SUBSEQUENT VISIT: ICD-10-PCS | Mod: S$GLB,,, | Performed by: NURSE PRACTITIONER

## 2020-09-15 PROCEDURE — 99999 PR PBB SHADOW E&M-EST. PATIENT-LVL V: ICD-10-PCS | Mod: PBBFAC,,, | Performed by: NURSE PRACTITIONER

## 2020-09-15 PROCEDURE — 99999 PR PBB SHADOW E&M-EST. PATIENT-LVL IV: ICD-10-PCS | Mod: PBBFAC,,, | Performed by: HOSPITALIST

## 2020-09-15 PROCEDURE — 99214 OFFICE O/P EST MOD 30 MIN: CPT | Mod: PBBFAC | Performed by: HOSPITALIST

## 2020-09-15 PROCEDURE — 99204 OFFICE O/P NEW MOD 45 MIN: CPT | Mod: S$PBB,,, | Performed by: HOSPITALIST

## 2020-09-15 PROCEDURE — 99204 PR OFFICE/OUTPT VISIT, NEW, LEVL IV, 45-59 MIN: ICD-10-PCS | Mod: S$PBB,,, | Performed by: HOSPITALIST

## 2020-09-15 PROCEDURE — 99999 PR PBB SHADOW E&M-EST. PATIENT-LVL IV: CPT | Mod: PBBFAC,,, | Performed by: HOSPITALIST

## 2020-09-15 PROCEDURE — G0439 PPPS, SUBSEQ VISIT: HCPCS | Mod: S$GLB,,, | Performed by: NURSE PRACTITIONER

## 2020-09-15 PROCEDURE — 99215 OFFICE O/P EST HI 40 MIN: CPT | Mod: PBBFAC,27,PO | Performed by: NURSE PRACTITIONER

## 2020-09-15 NOTE — PROGRESS NOTES
"Lukasz Person presented for a  Medicare AWV and comprehensive Health Risk Assessment today. The following components were reviewed and updated:    · Medical history  · Family History  · Social history  · Allergies and Current Medications  · Health Risk Assessment  · Health Maintenance  · Care Team     ** See Completed Assessments for Annual Wellness Visit within the encounter summary.**       The following assessments were completed:  · Living Situation    · CAGE  · Depression Screening  · Timed Get Up and Go  · Whisper Test  · Cognitive Function Screening  · Nutrition Screening  · ADL Screening  · PAQ Screening    Vitals:    09/15/20 1508   BP: 130/68   Pulse: 66   Resp: 15   SpO2: 98%   Weight: 120 kg (264 lb 8.8 oz)   Height: 5' 11.5" (1.816 m)     Body mass index is 36.38 kg/m².  Physical Exam  Constitutional:       Appearance: He is well-developed.   HENT:      Head: Normocephalic.      Comments: Wears mask     Right Ear: External ear normal.      Left Ear: External ear normal.      Mouth/Throat:      Pharynx: No oropharyngeal exudate.   Eyes:      General: No scleral icterus.  Cardiovascular:      Rate and Rhythm: Normal rate.   Pulmonary:      Effort: Pulmonary effort is normal. No respiratory distress.      Breath sounds: Normal breath sounds.   Abdominal:      General: Bowel sounds are normal. There is no distension.      Palpations: Abdomen is soft.      Comments: obese   Musculoskeletal: Normal range of motion.   Skin:     General: Skin is warm and dry.      Findings: No rash.   Neurological:      General: No focal deficit present.      Mental Status: He is alert and oriented to person, place, and time.      Cranial Nerves: No cranial nerve deficit.      Motor: No abnormal muscle tone.      Coordination: Coordination normal.      Deep Tendon Reflexes: Reflexes are normal and symmetric. Reflexes normal.   Psychiatric:         Mood and Affect: Mood normal.         Behavior: Behavior normal.         " Thought Content: Thought content normal.         Judgment: Judgment normal.           Lab Results   Component Value Date    HGBA1C 7.1 (H) 08/20/2020    CHOL 199 03/04/2020    HDL 42 03/04/2020    LDLCALC 108.8 03/04/2020    TRIG 241 (H) 03/04/2020    CHOLHDL 21.1 03/04/2020          Diagnoses and health risks identified today and associated recommendations/orders:    1. Hypertension associated with diabetes  Stable- followed by cardiology, PCP    2. Hyperlipidemia associated with type 2 diabetes mellitus  Stable- followed by cardiology, PCP    3. Hyperparathyroidism, primary  Stable- followed by nephrology PCP    4. Hypertriglyceridemia  Stable- followed by cardiology, PCP    5. KUMAR on CPAP  Stable- followed by sleep med    6. Type 2 diabetes mellitus with stage 3 chronic kidney disease, without long-term current use of insulin  Stable- followed by nephrology PCP      7. Idiopathic gout, unspecified chronicity, unspecified site  Stable- followed by nephrology PCP      8. Severe obesity (BMI 35.0-35.9 with comorbidity)  Chronic problem - no recent weight loss. Followed by PCP.   Centers for Disease Control and Prevention (CDC)  weight recommendations for current BMI & ideal BMI range discussed with patient.  Recommended diabetic, low purine, low citrate Low fat diet, continue regular exercise x 1 hour as tolerated every day.   9. Obesity, diabetes, and hypertension syndrome  Stable- followed by PCP    10. DM type 2 without retinopathy  Stable- followed by opth    11. Diabetes mellitus with cataract  Stable- followed by opth    12. Uncontrolled type 2 diabetes mellitus with hyperglycemia  Stable- followed by  PCP, DM APRN    13. Need for hepatitis C screening test  Stable- followed by  PCP    - Hepatitis C Antibody; Future    14. Ex-smoker  Stable- followed by  PCP  - CV AAA Screening; Future    15. Screening for AAA (abdominal aortic aneurysm)  Stable- followed by  PCP  - CV AAA Screening; Future    16. Male  hypogonadism  Stable- followed by  PCP    17. Encounter for preventive health examination  Here for Health Risk Assessment/Annual Wellness Visit.  Health maintenance reviewed and updated. Follow up in one year.       18. Need for Dental examination , dental hygiene care  Stable- followed by  PCP  - Ambulatory referral/consult to Outpatient Case Management      Provided Lukasz with a 5-10 year written screening schedule and personal prevention plan. Recommendations were developed using the USPSTF age appropriate recommendations. Education, counseling, and referrals were provided as needed. After Visit Summary printed and given to patient which includes a list of additional screenings\tests needed. Fall prevention. Does not monitor bld sugar at [facility]- recommended DM empowerment classes- he is interested- speak w PCP or Fede Casarez NP for orders. Fit test will be mailed. CM referral for dental resources & assistance. Sit & be fit on TV exercise- Obesity Code book. Handouts on diets- low fat, low purine, low citrate, DM. He will check on Insurance benefits for wt loss or bariatric medicine wt loss programs. Will be establishing himself w Dr Wilson in few weeks.    Follow up in about 1 year (around 9/15/2021) for hra.    MARYANN Mustafa offered to discuss end of life issues, including information on how to make advance directives that the patient could use to name someone who would make medical decisions on their behalf if they became too ill to make themselves.    ___Patient declined  _X_Patient is interested, I provided paper work and offered to discuss.

## 2020-09-15 NOTE — PATIENT INSTRUCTIONS
Please donot take NSAID's (Motrin, Advil, Aleve, Ibuprofen BC powder, Goody Powder, Diclofenac, Naproxen, Meloxicam/MOBIC, Celebrex etc), Ok to take baby aspirin.  The patient was educated in practicing a low salt diet.  · Avoid high salt foods (olives, pickles, smoked meats, salted potato chips, etc.).   · Do not add salt to your food at the table.   · Use only small amounts of salt when cooking.  Please eat low potassium and low phosphate diet.  Please limit the amout of animal protein.     Pls check your blood pressures at home

## 2020-09-15 NOTE — LETTER
September 15, 2020      Pallavi Nick MD  [address]  8th Floor - Cardiology  [place] [address]           [facility] - Nephrology 5th Fl  [address]  [facility] [address]  Phone: [phone]  Fax: [phone]          Patient: Lukasz Person   MR Number: [id number]   YOB: 1955   Date of Visit: 9/15/2020       Dear Dr. Pallavi Nick:    Thank you for referring Lukasz Person to me for evaluation. Attached you will find relevant portions of my assessment and plan of care.    If you have questions, please do not hesitate to call me. I look forward to following Lukasz Person along with you.    Sincerely,    Karol Oconnell MD    Enclosure  CC:  No Recipients    If you would like to receive this communication electronically, please contact [email or web address] or [phone] to request more information on Parental [facility] Link access.    For providers and/or their staff who would like to refer a patient to [facility], please contact us through our one-stop-shop provider referral line, [facility], at [phone].    If you feel you have received this communication in error or would no longer like to receive these types of communications, please e-mail [email or web address]

## 2020-09-18 ENCOUNTER — TELEPHONE (OUTPATIENT)
Dept: INTERNAL MEDICINE | Facility: CLINIC | Age: 65
End: 2020-09-18

## 2020-09-18 NOTE — TELEPHONE ENCOUNTER
----- Message from Charles Wheeler sent at 9/18/2020 11:52 AM CDT -----  Regarding: [phone]  Type:  Patient Returning Call    Who Called: Lukasz    Who Left Message for Patient: Kelsey    Does the patient know what this is regarding?: Returning a phone call    Would the patient rather a call back or a response via [facility]? Callback    Best Call Back Number: [phone]    Additional Information: Lukasz is requesting a callback.

## 2020-09-18 NOTE — TELEPHONE ENCOUNTER
----- Message from Charles Wheeler sent at 9/18/2020 11:52 AM CDT -----  Regarding: [phone]  Type:  Patient Returning Call    Who Called: Lukasz    Who Left Message for Patient: Kelsey    Does the patient know what this is regarding?: Returning a phone call    Would the patient rather a call back or a response via [facility]? Callback    Best Call Back Number: [phone]    Additional Information: Lukasz is requesting a callback.

## 2020-09-18 NOTE — TELEPHONE ENCOUNTER
Pt had blood drawn from another doctors office , pt wanted you to look at them and see if you really need him to go back to the lab or can he just wait until October he is sheduled.Please advise

## 2020-09-21 ENCOUNTER — PATIENT MESSAGE (OUTPATIENT)
Dept: CARDIOLOGY | Facility: CLINIC | Age: 65
End: 2020-09-21

## 2020-09-21 ENCOUNTER — TELEPHONE (OUTPATIENT)
Dept: CARDIOLOGY | Facility: CLINIC | Age: 65
End: 2020-09-21

## 2020-09-21 DIAGNOSIS — R94.39 ABNORMAL STRESS TEST: Primary | ICD-10-CM

## 2020-09-21 NOTE — TELEPHONE ENCOUNTER
Pt states he would like to know his results of his PET stress test. He states he sent a message Friday and he haven't heard anything Advised the patient Dr. Nick is on hospital services but I will route the message over to Dr. Nick.     ----- Message from Tamika Musa sent at 9/21/2020 11:54 AM CDT -----  Regarding: results  Pt is calling for test results and can be reached at [phone].    Thank you

## 2020-09-22 ENCOUNTER — OUTPATIENT CASE MANAGEMENT (OUTPATIENT)
Dept: ADMINISTRATIVE | Facility: OTHER | Age: 65
End: 2020-09-22

## 2020-09-23 NOTE — PROGRESS NOTES
Outpatient Care Management   - Low Risk Patient Assessment    Patient: Lukasz Person  MRN:  [id number]  Date of Service:  9/23/2020  Completed by:  Rosamaria Simmons LMSW  Referral Date: 09/15/2020  Program: OPCM Low Risk    Reason for Visit   Patient presents with    OPCM SW First Assessment Attempt     09/22/2020    Social Work Assessment - Low/Mod Risk     09/23/2020    Case Closure       Brief Summary: LMSW completed assessment with patient. Patient reports he resides with spouse. Patient reports he can complete ADL's. Patient denied using any assisted devices to ambulate. Patient denied needing assistance with affording food, shelter, medication or medical. Patient requesting dental resources. LMSW provided patient with available dental resources. Patient reports he drives himself to and from appointments. Patient reports he is in the process of completing MPOA and or MPOA. LMSW encourage patient to bring document to next office upon completion. Patient voiced understanding. PHQ was completed within the last 30 days. LMSW mailed all resources and provided her contact information for any additional needs.   .      Patient Summary     OPCM Social Work Assessment (Low/Moderate Risk)    General  Level of Caregiver support: Member independent and does not need caregiver assistance  Have you had to make a decision between paying for any of the following in the last 2 months?: None  Transportation means: Self  Employment status: Retired and not working  Assessments  Was the PHQ Depression Screening completed this visit?: No  Was the LARS-7 Screening completed this visit?: No         Complex Care Plan     Care plan was discussed and completed today with input from patient and/or caregiver.    Patient Instructions     No follow-ups on file.    Todays OPCM Self-Management Care Plan was developed with the patients/caregivers input and was based on identified barriers from todays assessment.  Goals were  written today with the patient/caregiver and the patient has agreed to work towards these goals to improve his/her overall well-being. Patient verbalized understanding of the care plan, goals, and all of today's instructions. Encouraged patient/caregiver to communicate with his/her physician and health care team about health conditions and the treatment plan.  Provided my contact information today and encouraged patient/caregiver to call me with any questions as needed.

## 2020-09-24 ENCOUNTER — INITIAL CONSULT (OUTPATIENT)
Dept: CARDIOLOGY | Facility: CLINIC | Age: 65
End: 2020-09-24
Payer: MEDICARE

## 2020-09-24 ENCOUNTER — DOCUMENTATION ONLY (OUTPATIENT)
Dept: CARDIOLOGY | Facility: CLINIC | Age: 65
End: 2020-09-24

## 2020-09-24 VITALS
OXYGEN SATURATION: 97 % | SYSTOLIC BLOOD PRESSURE: 140 MMHG | BODY MASS INDEX: 35.8 KG/M2 | HEIGHT: 72 IN | DIASTOLIC BLOOD PRESSURE: 64 MMHG | WEIGHT: 264.31 LBS | HEART RATE: 60 BPM

## 2020-09-24 DIAGNOSIS — E11.59 HYPERTENSION ASSOCIATED WITH DIABETES: ICD-10-CM

## 2020-09-24 DIAGNOSIS — N18.30 TYPE 2 DIABETES MELLITUS WITH STAGE 3 CHRONIC KIDNEY DISEASE, WITHOUT LONG-TERM CURRENT USE OF INSULIN: ICD-10-CM

## 2020-09-24 DIAGNOSIS — I15.2 HYPERTENSION ASSOCIATED WITH DIABETES: ICD-10-CM

## 2020-09-24 DIAGNOSIS — R94.39 ABNORMAL STRESS TEST: Primary | ICD-10-CM

## 2020-09-24 DIAGNOSIS — Z01.818 PRE-OP TESTING: Primary | ICD-10-CM

## 2020-09-24 DIAGNOSIS — N18.30 CKD (CHRONIC KIDNEY DISEASE) STAGE 3, GFR 30-59 ML/MIN: ICD-10-CM

## 2020-09-24 DIAGNOSIS — E11.69 HYPERLIPIDEMIA ASSOCIATED WITH TYPE 2 DIABETES MELLITUS: ICD-10-CM

## 2020-09-24 DIAGNOSIS — E11.22 TYPE 2 DIABETES MELLITUS WITH STAGE 3 CHRONIC KIDNEY DISEASE, WITHOUT LONG-TERM CURRENT USE OF INSULIN: ICD-10-CM

## 2020-09-24 DIAGNOSIS — E78.5 HYPERLIPIDEMIA ASSOCIATED WITH TYPE 2 DIABETES MELLITUS: ICD-10-CM

## 2020-09-24 PROCEDURE — 99204 PR OFFICE/OUTPT VISIT, NEW, LEVL IV, 45-59 MIN: ICD-10-PCS | Mod: S$PBB,,, | Performed by: INTERNAL MEDICINE

## 2020-09-24 PROCEDURE — 99999 PR PBB SHADOW E&M-EST. PATIENT-LVL IV: CPT | Mod: PBBFAC,,, | Performed by: INTERNAL MEDICINE

## 2020-09-24 PROCEDURE — 99204 OFFICE O/P NEW MOD 45 MIN: CPT | Mod: S$PBB,,, | Performed by: INTERNAL MEDICINE

## 2020-09-24 PROCEDURE — 99214 OFFICE O/P EST MOD 30 MIN: CPT | Mod: PBBFAC | Performed by: INTERNAL MEDICINE

## 2020-09-24 PROCEDURE — 99999 PR PBB SHADOW E&M-EST. PATIENT-LVL IV: ICD-10-PCS | Mod: PBBFAC,,, | Performed by: INTERNAL MEDICINE

## 2020-09-24 RX ORDER — DIPHENHYDRAMINE HCL 25 MG
50 CAPSULE ORAL ONCE
Status: CANCELLED | OUTPATIENT
Start: 2020-09-24 | End: 2020-09-24

## 2020-09-24 RX ORDER — CLOPIDOGREL BISULFATE 75 MG/1
75 TABLET ORAL DAILY
Qty: 30 TABLET | Refills: 11 | Status: ON HOLD | OUTPATIENT
Start: 2020-09-24 | End: 2020-09-30 | Stop reason: HOSPADM

## 2020-09-24 RX ORDER — SODIUM CHLORIDE 9 MG/ML
3 INJECTION, SOLUTION INTRAVENOUS CONTINUOUS
Status: CANCELLED | OUTPATIENT
Start: 2020-09-24 | End: 2020-09-24

## 2020-09-24 NOTE — ASSESSMENT & PLAN NOTE
- LDL higher than I would like at 101, need to get to goal <70  - will complete angiogram then make changes as needed   - continue Crestor 20 mg for now

## 2020-09-24 NOTE — ASSESSMENT & PLAN NOTE
- Patient has CKD 3 likely from diabetes  - Baseline 1.5, GFR 48  - Explained the importance of hydration pre procedure and the risk of MAE  - MAE score 6, risk of MEA 14%, risk of dialysis 0.12%  - Low contrast technique

## 2020-09-24 NOTE — ASSESSMENT & PLAN NOTE
- Patient has classic anginal symptoms on exertion and positive PET in the RCA territory suggesting possible  with collaterals  - Plan for staged procedure and discussion if truly . Plan for PCI if focal stenosis.  - LHC +/- PCI  - Plavix prescribed, ASA continue  - Access R Radial  -The risks, benefits & alternatives of the procedure were explained to the patient.    -The risks of coronary angiography include but are not limited to:  Bleeding, infection, heart rhythm abnormalities, allergic reactions, kidney injury, stroke and death.    -Should stenting be indicated, the patient has agreed to dual anti-platelet therapy for 1-consecutive year with a drug-eluting stent and a minimum of 1-month with the use of a bare metal stent.    -The risks of moderate sedation include hypotension, respiratory depression, arrhythmias, bronchospasm, & death.    -Informed consent was obtained & the patient is agreeable to proceed with the procedure.

## 2020-09-24 NOTE — PROGRESS NOTES
OUTPATIENT CATHETERIZATION INSTRUCTIONS    You have been scheduled for a procedure in the catheterization lab on Wednesday, September 30, 2020.     Please report to the Cardiology Waiting Area on the Third floor of the hospital and check in at 9 AM.   You will then be taken to the SSCU (Short Stay Cardiac Unit) and prepared for your procedure. Please be aware that this is not the time of your procedure but the time you are to arrive. The procedures are scheduled on an hourly basis; however, emergency cases take precedence over all other cases.       You may not have anything to eat or drink after midnight the night before your test. You may take your regular morning medications with water. If there are any medications that you should not take you will be instructed to hold them that morning. If you are diabetic and on Metformin (Glucophage) do not take it the day before, the day of, and for 2 days after your procedure.      The procedure will take 1-2 hours to perform. After the procedure, you will return to SSCU on the third floor of the hospital. You will need to lie still (or keep your arm still) for the next 4 to 6 hours to minimize bleeding from the puncture site. Your family may remain in the room with you during this time.       You may be able to be discharged home that same afternoon if there is someone to drive you home and there were no complications. If you have one of the balloon, stent, or device procedures you may spend the night in the hospital. Your doctor will determine, based on your progress, the date and time of your discharge. The results of your procedure will be discussed with you before you are discharged. Any further testing or procedures will be scheduled for you either before you leave or you will be called with these appointments.       If you should have any questions, concerns, or need to change the date of your procedure, please call  MARY Martinez @ [phone]    Special  Instructions:  Hold Metformin Tuesday, Wednesday, Thursday and Friday  Take 8 Plavix pills the day before your procedure and 1 pill the morning of your procedure before coming to the hospital.  Drink plenty of water the day before and after your procedure.     THE ABOVE INSTRUCTIONS WERE GIVEN TO THE PATIENT VERBALLY AND THEY VERBALIZED UNDERSTANDING.  THEY DO NOT REQUIRE ANY SPECIAL NEEDS AND DO NOT HAVE ANY LEARNING BARRIERS.          Directions for Reporting to Cardiology Waiting Area in the Hospital  If you park in the Parking Garage:  Take elevators to the1st floor of the parking garage.  Continue past the gift shop, coffee shop, and piano.  Take a right and go to the gold elevators. (Elevator B)  Take the elevator to the 3rd floor.  Follow the arrow on the sign on the wall that says Cath Lab Registration/EP Lab Registration.  Follow the long hallway all the way around until you come to a big open area.  This is the registration area.  Check in at Reception Desk.    OR    If family is dropping you off:  Have them drop you off at the front of the Hospital under the green overhang.  Enter through the doors and take a right.  Take the E elevators to the 3rd floor Cardiology Waiting Area.  Check in at the Reception Desk in the waiting room.

## 2020-09-24 NOTE — PROGRESS NOTES
Subjective:    Patient ID:  Lukasz Person is a 65 y.o. male who presents for evaluation of Abnormal stress test.    Referring cardiologist: Dr. Nick    HPI  66 yo M with PMH of HTN, CKD 3, DM2, HLD presenting to us with abnormal PET stress. He has been having neck pain and upper chest pain described as dull lasting for 30 mins during exertional exercise relieved by rest for the last 6 months. He is pretty active walking 3 miles 5 times a week until recently. He denies dyspnea on exertion, LE edema,  PND, orthopnea, palpitations or claudication.    PET Stress 9/2020    The relative PET images are normal showing no clinically significant regional resting or stress induced perfusion defects.    Whole heart absolute myocardial perfusion (cc/min/g) averaged 0.59 cc/min/g at rest, which is reduced, 1.73 cc/min/g at stress, which is mildly reduced, and 2.94  CFR, which is normal.    There is mild thinning of the mid and distal inferior wall with preserved flow capacity.  These findings are consistent with non-obstructive RCA disease or possible RCA  with excellent collaterals.    Gated perfusion images showed an ejection fraction of 57% at rest and 79% during stress. Normal ejection fraction is greater than 51%.    Wall motion was normal at rest and during stress.    LV cavity size is normal at rest and stress.    The EKG portion of this study is negative for ischemia.    Arrhythmias during stress: occasional PVCs.    The patient reported no chest pain during the stress test.    There are no prior studies for comparison.    Past Medical History:   Diagnosis Date    Diabetes mellitus     Onset late 50s/early 60s    Hyperlipidemia     Hypertension     Onset late 50s/early 60s    Sleep apnea     since 2006     Past Surgical History:   Procedure Laterality Date    LITHOTRIPSY      PARATHYROIDECTOMY  1/1/2-107     Current Outpatient Medications on File Prior to Visit   Medication Sig Dispense Refill     allopurinoL (ZYLOPRIM) 100 MG tablet TAKE 1 TABLET BY MOUTH ONCE DAILY 30 tablet 3    aspirin (ECOTRIN) 81 MG EC tablet Take 1 tablet (81 mg total) by mouth once daily. 1 tablet 0    fenofibrate 160 MG Tab TAKE 1 TABLET(160 MG) BY MOUTH EVERY DAY 30 tablet 3    glimepiride (AMARYL) 2 MG tablet TAKE 2 TABLETS BY MOUTH EVERY MORNING 60 tablet 3    lisinopriL-hydrochlorothiazide (PRINZIDE,ZESTORETIC) 20-25 mg Tab TAKE 1 TABLET BY MOUTH EVERY DAY 30 tablet 3    metFORMIN (GLUCOPHAGE) 500 MG tablet TAKE 2 TABLETS BY MOUTH EVERY MORNING AND 2 WITH DINNER 120 tablet 0    potassium citrate (UROCIT-K) 10 mEq (1,080 mg) TbSR TAKE 1 TABLET BY MOUTH TWICE DAILY 60 tablet 1    rosuvastatin (CRESTOR) 20 MG tablet Take 1 tablet (20 mg total) by mouth once daily. 30 tablet 11     No current facility-administered medications on file prior to visit.      Review of patient's allergies indicates:  No Known Allergies  Social History     Tobacco Use    Smoking status: Former Smoker     Packs/day: 1.00     Quit date:      Years since quittin.    Smokeless tobacco: Never Used   Substance Use Topics    Alcohol use: Yes     Frequency: 2-4 times a month     Drinks per session: 3 or 4     Binge frequency: Less than monthly     Comment: 3 drinks a week    Drug use: Not Currently     Family History   Problem Relation Age of Onset    Heart disease Father 70        CABG    Colon cancer Brother 32    Diabetes Paternal Grandmother     Colon polyps Paternal Grandfather     Colon cancer Paternal Grandfather        Review of Systems   Constitution: Negative for chills, decreased appetite and diaphoresis.   HENT: Negative for congestion and ear discharge.    Eyes: Negative for blurred vision and discharge.   Cardiovascular: Negative for chest pain, dyspnea on exertion, irregular heartbeat, leg swelling and paroxysmal nocturnal dyspnea.   Respiratory: Negative for cough, hemoptysis and shortness of breath.    Gastrointestinal:  "Negative for abdominal pain.        Objective:  Vitals:    09/24/20 1017 09/24/20 1020   BP: (!) 144/66 (!) 140/64   BP Location: Right arm Left arm   Patient Position: Sitting Sitting   BP Method: Large (Automatic) Large (Automatic)   Pulse: 60 60   SpO2: 97%    Weight: 119.9 kg (264 lb 5.3 oz)    Height: 5' 11.5" (1.816 m)          Physical Exam   Constitutional: He is oriented to person, place, and time. He appears well-developed and well-nourished. No distress.   Eyes: Pupils are equal, round, and reactive to light. Conjunctivae are normal.   Neck: No tracheal deviation present. No thyromegaly present.   Cardiovascular: Normal rate, regular rhythm, normal heart sounds and intact distal pulses. Exam reveals no gallop and no friction rub.   No murmur heard.  Pulses:       Radial pulses are 2+ on the right side and 2+ on the left side.        Femoral pulses are 2+ on the right side and 2+ on the left side.  Pulmonary/Chest: Effort normal and breath sounds normal. No respiratory distress. He has no wheezes. He has no rales.   Abdominal: Soft. Bowel sounds are normal. He exhibits no distension. There is no abdominal tenderness.   Musculoskeletal:         General: No deformity or edema.   Neurological: He is alert and oriented to person, place, and time. No cranial nerve deficit. Coordination normal.   Skin: Skin is warm and dry. He is not diaphoretic.   Psychiatric: He has a normal mood and affect. His behavior is normal.         Assessment:       1. Abnormal stress test    2. CKD (chronic kidney disease) stage 3, GFR 30-59 ml/min    3. Type 2 diabetes mellitus with stage 3 chronic kidney disease, without long-term current use of insulin    4. Hyperlipidemia associated with type 2 diabetes mellitus         Plan:       Abnormal stress test  - Patient has classic anginal symptoms on exertion and positive PET in the RCA territory suggesting possible  with collaterals  - Plan for staged procedure and discussion if " truly . Plan for ad hoc PCI if focal high grade stenosis.  - LHC +/- PCI  - Plavix prescribed, ASA continue  - Access R Radial  -The risks, benefits & alternatives of the procedure were explained to the patient.    -The risks of coronary angiography include but are not limited to:  Bleeding, infection, heart rhythm abnormalities, allergic reactions, kidney injury, stroke and death.    -Should stenting be indicated, the patient has agreed to dual anti-platelet therapy for 1-consecutive year with a drug-eluting stent and a minimum of 1-month with the use of a bare metal stent.    -The risks of moderate sedation include hypotension, respiratory depression, arrhythmias, bronchospasm, & death.    -Informed consent was obtained & the patient is agreeable to proceed with the procedure.      CKD (chronic kidney disease) stage 3, GFR 30-59 ml/min  - Patient has CKD 3 likely from diabetes  - Baseline 1.5, GFR 48  - Explained the importance of hydration pre procedure and the risk of MAE  - MAE score 6, risk of MAE 14%, risk of dialysis 0.12%  - Low contrast technique      Type 2 diabetes mellitus with kidney complication, without long-term current use of insulin  - A1C 7.1  - Advised tight glycemic control with meds plus diet and exercise     Hyperlipidemia associated with type 2 diabetes mellitus  - LDL higher than I would like at 101, need to get to goal <70  - will complete angiogram then make changes as needed   - continue Crestor 20 mg for now    Hypertension associated with diabetes  - Usually better controlled was rushing for appt today; reassess after cath  - Continue medications as prescribed  - Reduce salt intake        Hany Graves MD  Cardiology Fellow    I have personally taken the history and examined this patient and agree with the resident's note as stated above.  All of the patient's questions were answered.

## 2020-09-24 NOTE — ASSESSMENT & PLAN NOTE
- Usually better controlled was rushing for appt today  - Continue medications as prescribed  - Reduce salt intake

## 2020-09-24 NOTE — LETTER
September 24, 2020      Pallavi Nick MD  [address]  8th Floor - Cardiology  [place] [address]           [facility] 3rd Fl  [address]  [facility] [address]  Phone: [phone]          Patient: Lukasz Person   MR Number: [id number]   YOB: 1955   Date of Visit: 9/24/2020       Dear Dr. Pallavi Nick:    Thank you for referring Lukasz Person to me for evaluation. Attached you will find relevant portions of my assessment and plan of care.    If you have questions, please do not hesitate to call me. I look forward to following Lukasz Person along with you.    Sincerely,    John West MD    Enclosure  CC:  No Recipients    If you would like to receive this communication electronically, please contact [email or web address] or [phone] to request more information on [facility] access.    For providers and/or their staff who would like to refer a patient to [facility], please contact us through our one-stop-shop provider referral line, [facility], at [phone].    If you feel you have received this communication in error or would no longer like to receive these types of communications, please e-mail [email or web address]

## 2020-09-28 ENCOUNTER — LAB VISIT (OUTPATIENT)
Dept: LAB | Facility: HOSPITAL | Age: 65
End: 2020-09-28
Attending: INTERNAL MEDICINE
Payer: MEDICARE

## 2020-09-28 ENCOUNTER — LAB VISIT (OUTPATIENT)
Dept: SURGERY | Facility: CLINIC | Age: 65
End: 2020-09-28
Payer: MEDICARE

## 2020-09-28 DIAGNOSIS — Z01.818 PRE-OP TESTING: ICD-10-CM

## 2020-09-28 LAB
ANION GAP SERPL CALC-SCNC: 10 MMOL/L (ref 8–16)
BASOPHILS # BLD AUTO: 0.06 K/UL (ref 0–0.2)
BASOPHILS NFR BLD: 1 % (ref 0–1.9)
BUN SERPL-MCNC: 17 MG/DL (ref 8–23)
CALCIUM SERPL-MCNC: 9.1 MG/DL (ref 8.7–10.5)
CHLORIDE SERPL-SCNC: 100 MMOL/L (ref 95–110)
CO2 SERPL-SCNC: 28 MMOL/L (ref 23–29)
CREAT SERPL-MCNC: 1.5 MG/DL (ref 0.5–1.4)
DIFFERENTIAL METHOD: ABNORMAL
EOSINOPHIL # BLD AUTO: 0.2 K/UL (ref 0–0.5)
EOSINOPHIL NFR BLD: 2.9 % (ref 0–8)
ERYTHROCYTE [DISTWIDTH] IN BLOOD BY AUTOMATED COUNT: 13.4 % (ref 11.5–14.5)
EST. GFR  (AFRICAN AMERICAN): 55.3 ML/MIN/1.73 M^2
EST. GFR  (NON AFRICAN AMERICAN): 47.8 ML/MIN/1.73 M^2
GLUCOSE SERPL-MCNC: 128 MG/DL (ref 70–110)
HCT VFR BLD AUTO: 41.5 % (ref 40–54)
HGB BLD-MCNC: 12.9 G/DL (ref 14–18)
IMM GRANULOCYTES # BLD AUTO: 0.03 K/UL (ref 0–0.04)
IMM GRANULOCYTES NFR BLD AUTO: 0.5 % (ref 0–0.5)
LYMPHOCYTES # BLD AUTO: 2.2 K/UL (ref 1–4.8)
LYMPHOCYTES NFR BLD: 36.4 % (ref 18–48)
MCH RBC QN AUTO: 29.6 PG (ref 27–31)
MCHC RBC AUTO-ENTMCNC: 31.1 G/DL (ref 32–36)
MCV RBC AUTO: 95 FL (ref 82–98)
MONOCYTES # BLD AUTO: 0.5 K/UL (ref 0.3–1)
MONOCYTES NFR BLD: 7.6 % (ref 4–15)
NEUTROPHILS # BLD AUTO: 3.1 K/UL (ref 1.8–7.7)
NEUTROPHILS NFR BLD: 51.6 % (ref 38–73)
NRBC BLD-RTO: 0 /100 WBC
PLATELET # BLD AUTO: 166 K/UL (ref 150–350)
PMV BLD AUTO: 10.3 FL (ref 9.2–12.9)
POTASSIUM SERPL-SCNC: 4.5 MMOL/L (ref 3.5–5.1)
RBC # BLD AUTO: 4.36 M/UL (ref 4.6–6.2)
SARS-COV-2 RNA RESP QL NAA+PROBE: NOT DETECTED
SODIUM SERPL-SCNC: 138 MMOL/L (ref 136–145)
WBC # BLD AUTO: 5.91 K/UL (ref 3.9–12.7)

## 2020-09-28 PROCEDURE — 36415 COLL VENOUS BLD VENIPUNCTURE: CPT

## 2020-09-28 PROCEDURE — U0003 INFECTIOUS AGENT DETECTION BY NUCLEIC ACID (DNA OR RNA); SEVERE ACUTE RESPIRATORY SYNDROME CORONAVIRUS 2 (SARS-COV-2) (CORONAVIRUS DISEASE [COVID-19]), AMPLIFIED PROBE TECHNIQUE, MAKING USE OF HIGH THROUGHPUT TECHNOLOGIES AS DESCRIBED BY CMS-2020-01-R: HCPCS

## 2020-09-28 PROCEDURE — 85025 COMPLETE CBC W/AUTO DIFF WBC: CPT

## 2020-09-28 PROCEDURE — 80048 BASIC METABOLIC PNL TOTAL CA: CPT

## 2020-09-30 ENCOUNTER — HOSPITAL ENCOUNTER (OUTPATIENT)
Facility: HOSPITAL | Age: 65
Discharge: HOME | End: 2020-09-30
Attending: INTERNAL MEDICINE | Admitting: INTERNAL MEDICINE
Payer: MEDICARE

## 2020-09-30 VITALS
HEART RATE: 49 BPM | BODY MASS INDEX: 35.62 KG/M2 | RESPIRATION RATE: 18 BRPM | OXYGEN SATURATION: 97 % | HEIGHT: 72 IN | SYSTOLIC BLOOD PRESSURE: 139 MMHG | TEMPERATURE: 98 F | DIASTOLIC BLOOD PRESSURE: 67 MMHG | WEIGHT: 263 LBS

## 2020-09-30 DIAGNOSIS — R94.39 ABNORMAL STRESS TEST: ICD-10-CM

## 2020-09-30 PROBLEM — I25.10 CORONARY ARTERY DISEASE INVOLVING NATIVE CORONARY ARTERY OF NATIVE HEART: Status: ACTIVE | Noted: 2020-09-30

## 2020-09-30 LAB
ABO + RH BLD: NORMAL
ANION GAP SERPL CALC-SCNC: 12 MMOL/L (ref 8–16)
BASOPHILS # BLD AUTO: 0.04 K/UL (ref 0–0.2)
BASOPHILS NFR BLD: 0.8 % (ref 0–1.9)
BLD GP AB SCN CELLS X3 SERPL QL: NORMAL
BUN SERPL-MCNC: 18 MG/DL (ref 8–23)
CALCIUM SERPL-MCNC: 9.3 MG/DL (ref 8.7–10.5)
CHLORIDE SERPL-SCNC: 101 MMOL/L (ref 95–110)
CO2 SERPL-SCNC: 25 MMOL/L (ref 23–29)
CREAT SERPL-MCNC: 1.2 MG/DL (ref 0.5–1.4)
DIFFERENTIAL METHOD: ABNORMAL
EOSINOPHIL # BLD AUTO: 0.2 K/UL (ref 0–0.5)
EOSINOPHIL NFR BLD: 3.2 % (ref 0–8)
ERYTHROCYTE [DISTWIDTH] IN BLOOD BY AUTOMATED COUNT: 13.3 % (ref 11.5–14.5)
EST. GFR  (AFRICAN AMERICAN): >60 ML/MIN/1.73 M^2
EST. GFR  (NON AFRICAN AMERICAN): >60 ML/MIN/1.73 M^2
GLUCOSE SERPL-MCNC: 131 MG/DL (ref 70–110)
HCT VFR BLD AUTO: 39.4 % (ref 40–54)
HGB BLD-MCNC: 12.9 G/DL (ref 14–18)
IMM GRANULOCYTES # BLD AUTO: 0.02 K/UL (ref 0–0.04)
IMM GRANULOCYTES NFR BLD AUTO: 0.4 % (ref 0–0.5)
LYMPHOCYTES # BLD AUTO: 1.7 K/UL (ref 1–4.8)
LYMPHOCYTES NFR BLD: 33.1 % (ref 18–48)
MCH RBC QN AUTO: 29.7 PG (ref 27–31)
MCHC RBC AUTO-ENTMCNC: 32.7 G/DL (ref 32–36)
MCV RBC AUTO: 91 FL (ref 82–98)
MONOCYTES # BLD AUTO: 0.4 K/UL (ref 0.3–1)
MONOCYTES NFR BLD: 7.2 % (ref 4–15)
NEUTROPHILS # BLD AUTO: 2.8 K/UL (ref 1.8–7.7)
NEUTROPHILS NFR BLD: 55.3 % (ref 38–73)
NRBC BLD-RTO: 0 /100 WBC
PLATELET # BLD AUTO: 169 K/UL (ref 150–350)
PMV BLD AUTO: 10.5 FL (ref 9.2–12.9)
POCT GLUCOSE: 131 MG/DL (ref 70–110)
POTASSIUM SERPL-SCNC: 3.9 MMOL/L (ref 3.5–5.1)
RBC # BLD AUTO: 4.34 M/UL (ref 4.6–6.2)
SODIUM SERPL-SCNC: 138 MMOL/L (ref 136–145)
WBC # BLD AUTO: 5.01 K/UL (ref 3.9–12.7)

## 2020-09-30 PROCEDURE — 82962 GLUCOSE BLOOD TEST: CPT | Performed by: INTERNAL MEDICINE

## 2020-09-30 PROCEDURE — 99152 MOD SED SAME PHYS/QHP 5/>YRS: CPT | Performed by: INTERNAL MEDICINE

## 2020-09-30 PROCEDURE — 93571 IV DOP VEL&/PRESS C FLO 1ST: CPT | Mod: 52 | Performed by: INTERNAL MEDICINE

## 2020-09-30 PROCEDURE — 25500020 PHARM REV CODE 255: Performed by: INTERNAL MEDICINE

## 2020-09-30 PROCEDURE — 80048 BASIC METABOLIC PNL TOTAL CA: CPT

## 2020-09-30 PROCEDURE — 85025 COMPLETE CBC W/AUTO DIFF WBC: CPT

## 2020-09-30 PROCEDURE — 99153 MOD SED SAME PHYS/QHP EA: CPT | Performed by: INTERNAL MEDICINE

## 2020-09-30 PROCEDURE — 93458 L HRT ARTERY/VENTRICLE ANGIO: CPT | Performed by: INTERNAL MEDICINE

## 2020-09-30 PROCEDURE — C1887 CATHETER, GUIDING: HCPCS | Performed by: INTERNAL MEDICINE

## 2020-09-30 PROCEDURE — C1769 GUIDE WIRE: HCPCS | Performed by: INTERNAL MEDICINE

## 2020-09-30 PROCEDURE — 93458 L HRT ARTERY/VENTRICLE ANGIO: CPT | Mod: 26,,, | Performed by: INTERNAL MEDICINE

## 2020-09-30 PROCEDURE — 25000003 PHARM REV CODE 250: Performed by: STUDENT IN AN ORGANIZED HEALTH CARE EDUCATION/TRAINING PROGRAM

## 2020-09-30 PROCEDURE — 86901 BLOOD TYPING SEROLOGIC RH(D): CPT

## 2020-09-30 PROCEDURE — 25000003 PHARM REV CODE 250: Performed by: INTERNAL MEDICINE

## 2020-09-30 PROCEDURE — 63600175 PHARM REV CODE 636 W HCPCS: Performed by: INTERNAL MEDICINE

## 2020-09-30 PROCEDURE — 93571 IV DOP VEL&/PRESS C FLO 1ST: CPT | Mod: 26,52,, | Performed by: INTERNAL MEDICINE

## 2020-09-30 PROCEDURE — 99152 MOD SED SAME PHYS/QHP 5/>YRS: CPT | Mod: ,,, | Performed by: INTERNAL MEDICINE

## 2020-09-30 PROCEDURE — 93458 PR CATH PLACE/CORON ANGIO, IMG SUPER/INTERP,W LEFT HEART VENTRICULOGRAPHY: ICD-10-PCS | Mod: 26,,, | Performed by: INTERNAL MEDICINE

## 2020-09-30 PROCEDURE — C1894 INTRO/SHEATH, NON-LASER: HCPCS | Performed by: INTERNAL MEDICINE

## 2020-09-30 PROCEDURE — 85347 COAGULATION TIME ACTIVATED: CPT | Performed by: INTERNAL MEDICINE

## 2020-09-30 PROCEDURE — 99152 PR MOD CONSCIOUS SEDATION, SAME PHYS, 5+ YRS, FIRST 15 MIN: ICD-10-PCS | Mod: ,,, | Performed by: INTERNAL MEDICINE

## 2020-09-30 PROCEDURE — 93571 PR HEART FLOW RESERV MEASURE,INIT VESSL: ICD-10-PCS | Mod: 26,52,, | Performed by: INTERNAL MEDICINE

## 2020-09-30 RX ORDER — ASPIRIN 81 MG/1
81 TABLET ORAL ONCE
Status: DISCONTINUED | OUTPATIENT
Start: 2020-09-30 | End: 2020-09-30

## 2020-09-30 RX ORDER — LIDOCAINE HYDROCHLORIDE 20 MG/ML
INJECTION, SOLUTION EPIDURAL; INFILTRATION; INTRACAUDAL; PERINEURAL
Status: DISCONTINUED | OUTPATIENT
Start: 2020-09-30 | End: 2020-09-30 | Stop reason: HOSPADM

## 2020-09-30 RX ORDER — DIPHENHYDRAMINE HCL 50 MG
50 CAPSULE ORAL ONCE
Status: COMPLETED | OUTPATIENT
Start: 2020-09-30 | End: 2020-09-30

## 2020-09-30 RX ORDER — SODIUM CHLORIDE 9 MG/ML
3 INJECTION, SOLUTION INTRAVENOUS CONTINUOUS
Status: ACTIVE | OUTPATIENT
Start: 2020-09-30 | End: 2020-09-30

## 2020-09-30 RX ORDER — HEPARIN SODIUM 200 [USP'U]/100ML
INJECTION, SOLUTION INTRAVENOUS
Status: DISCONTINUED | OUTPATIENT
Start: 2020-09-30 | End: 2020-09-30 | Stop reason: HOSPADM

## 2020-09-30 RX ORDER — FENTANYL CITRATE 50 UG/ML
INJECTION, SOLUTION INTRAMUSCULAR; INTRAVENOUS
Status: DISCONTINUED | OUTPATIENT
Start: 2020-09-30 | End: 2020-09-30 | Stop reason: HOSPADM

## 2020-09-30 RX ORDER — NAPROXEN SODIUM 220 MG/1
TABLET, FILM COATED ORAL
Status: DISCONTINUED
Start: 2020-09-30 | End: 2020-09-30 | Stop reason: HOSPADM

## 2020-09-30 RX ORDER — NAPROXEN SODIUM 220 MG/1
81 TABLET, FILM COATED ORAL ONCE
Status: COMPLETED | OUTPATIENT
Start: 2020-09-30 | End: 2020-09-30

## 2020-09-30 RX ORDER — MIDAZOLAM HYDROCHLORIDE 1 MG/ML
INJECTION, SOLUTION INTRAMUSCULAR; INTRAVENOUS
Status: DISCONTINUED | OUTPATIENT
Start: 2020-09-30 | End: 2020-09-30 | Stop reason: HOSPADM

## 2020-09-30 RX ORDER — HEPARIN SODIUM 1000 [USP'U]/ML
INJECTION, SOLUTION INTRAVENOUS; SUBCUTANEOUS
Status: DISCONTINUED | OUTPATIENT
Start: 2020-09-30 | End: 2020-09-30 | Stop reason: HOSPADM

## 2020-09-30 RX ADMIN — ASPIRIN 81 MG: 81 TABLET, CHEWABLE ORAL at 11:09

## 2020-09-30 RX ADMIN — SODIUM CHLORIDE 3 ML/KG/HR: 0.9 INJECTION, SOLUTION INTRAVENOUS at 09:09

## 2020-09-30 RX ADMIN — DIPHENHYDRAMINE HYDROCHLORIDE 50 MG: 50 CAPSULE ORAL at 09:09

## 2020-09-30 NOTE — PLAN OF CARE
Received report from MARY Enciso. Patient s/p [facility], AAOx3. VSS, no c/o pain or discomfort at this time, resp even and unlabored. Vasc band dressing to R radial is CDI. No active bleeding. No hematoma noted. Post procedure protocol reviewed with patient and patient's family. Understanding verbalized. Family members at bedside. Nurse call bell within reach. Will continue to monitor per post procedure protocol.

## 2020-09-30 NOTE — DISCHARGE SUMMARY
[facility] - Short Stay Cardiac Unit  Interventional Cardiology  Discharge Summary      Patient Name: Lukasz Person  MRN: [id number]  Admission Date: 9/30/2020  Hospital Length of Stay: 0 days  Discharge Date and Time:  09/30/2020 1:27 PM  Attending Physician: John West MD  Discharging Provider: Ava Hernandez MD  Primary Care Physician: Geni Morrison NP    HPI/Hospital Course: Patient presented outpatient for LHC +/- PCI given abnormal cardiac PET stress test. Access obtained via right radial artery. Coronary angiogram found non-obstructive CAD. iFR was performed of PDA and found to be clinically insignificant. No coronary interventions occurred. LVEDP was ~ 10 mmHg. Vasc band was applied to right radial arteriotomy. Patient was observed post procedure prior to discharge home. Plavix was discontinued. Patient will follow up with is primary cardiologist, Dr. Nick.     Procedure(s) (LRB):  Left heart cath (Left)     Medical Diagnosis:   * Coronary artery disease involving native coronary artery of native heart    Discharged Condition: good    Follow Up:  Follow-up Information     Pallavi Nick MD In 2 weeks.    Specialty: Cardiology  Contact information:  [address]  8th Floor - Cardiology  [facility] [address]  [phone]                 Medications:  Reconciled Home Medications:      Medication List      CONTINUE taking these medications    allopurinoL 100 MG tablet  Commonly known as: ZYLOPRIM  TAKE 1 TABLET BY MOUTH ONCE DAILY     aspirin 81 MG EC tablet  Commonly known as: ECOTRIN  Take 1 tablet (81 mg total) by mouth once daily.     fenofibrate 160 MG Tab  TAKE 1 TABLET(160 MG) BY MOUTH EVERY DAY     glimepiride 2 MG tablet  Commonly known as: AMARYL  TAKE 2 TABLETS BY MOUTH EVERY MORNING     lisinopriL-hydrochlorothiazide 20-25 mg Tab  Commonly known as: PRINZIDE,ZESTORETIC  TAKE 1 TABLET BY MOUTH EVERY DAY     metFORMIN 500 MG tablet  Commonly known as: GLUCOPHAGE  TAKE 2  TABLETS BY MOUTH EVERY MORNING AND 2 WITH DINNER     potassium citrate 10 mEq (1,080 mg) Tbsr  Commonly known as: UROCIT-K  TAKE 1 TABLET BY MOUTH TWICE DAILY     rosuvastatin 20 MG tablet  Commonly known as: CRESTOR  Take 1 tablet (20 mg total) by mouth once daily.        STOP taking these medications    clopidogreL 75 mg tablet  Commonly known as: PLAVIX          Ava Hernandez MD  Interventional Cardiology  [facility] - Short Stay Cardiac Unit

## 2020-09-30 NOTE — INTERVAL H&P NOTE
The patient has been examined and the H&P has been reviewed:    I concur with the findings and no changes have occurred since H&P was written.    Anesthesia risks, benefits and alternative options discussed and understood by patient/family.          Active Hospital Problems    Diagnosis  POA    Abnormal stress test [R94.39]  Yes      Resolved Hospital Problems   No resolved problems to display.

## 2020-10-02 LAB
POC ACTIVATED CLOTTING TIME K: 208 SEC (ref 74–137)
SAMPLE: ABNORMAL

## 2020-10-05 ENCOUNTER — TELEPHONE (OUTPATIENT)
Dept: CARDIOLOGY | Facility: CLINIC | Age: 65
End: 2020-10-05

## 2020-10-05 DIAGNOSIS — E11.69 HYPERLIPIDEMIA ASSOCIATED WITH TYPE 2 DIABETES MELLITUS: Primary | ICD-10-CM

## 2020-10-05 DIAGNOSIS — E78.5 HYPERLIPIDEMIA ASSOCIATED WITH TYPE 2 DIABETES MELLITUS: Primary | ICD-10-CM

## 2020-10-05 NOTE — TELEPHONE ENCOUNTER
----- Message from Cosmoyanaya Nick MD sent at 10/4/2020 11:13 AM CDT -----  Had angiogram. Mild blockages. Started on Crestor. Needs fup w usual labs gilmar Emerson in 2 months

## 2020-10-13 ENCOUNTER — OFFICE VISIT (OUTPATIENT)
Dept: INTERNAL MEDICINE | Facility: CLINIC | Age: 65
End: 2020-10-13
Payer: MEDICARE

## 2020-10-13 VITALS
BODY MASS INDEX: 36.13 KG/M2 | HEART RATE: 64 BPM | HEIGHT: 72 IN | DIASTOLIC BLOOD PRESSURE: 64 MMHG | WEIGHT: 266.75 LBS | OXYGEN SATURATION: 97 % | RESPIRATION RATE: 18 BRPM | TEMPERATURE: 98 F | SYSTOLIC BLOOD PRESSURE: 122 MMHG

## 2020-10-13 DIAGNOSIS — M10.00 IDIOPATHIC GOUT, UNSPECIFIED CHRONICITY, UNSPECIFIED SITE: ICD-10-CM

## 2020-10-13 DIAGNOSIS — E29.1 MALE HYPOGONADISM: ICD-10-CM

## 2020-10-13 DIAGNOSIS — Z23 NEED FOR SHINGLES VACCINE: ICD-10-CM

## 2020-10-13 DIAGNOSIS — E11.59 HYPERTENSION ASSOCIATED WITH DIABETES: ICD-10-CM

## 2020-10-13 DIAGNOSIS — Z12.5 PROSTATE CANCER SCREENING: ICD-10-CM

## 2020-10-13 DIAGNOSIS — N18.31 TYPE 2 DIABETES MELLITUS WITH STAGE 3A CHRONIC KIDNEY DISEASE, WITHOUT LONG-TERM CURRENT USE OF INSULIN: Primary | ICD-10-CM

## 2020-10-13 DIAGNOSIS — I15.2 HYPERTENSION ASSOCIATED WITH DIABETES: ICD-10-CM

## 2020-10-13 DIAGNOSIS — E11.65 UNCONTROLLED TYPE 2 DIABETES MELLITUS WITH HYPERGLYCEMIA: ICD-10-CM

## 2020-10-13 DIAGNOSIS — E78.5 HYPERLIPIDEMIA ASSOCIATED WITH TYPE 2 DIABETES MELLITUS: ICD-10-CM

## 2020-10-13 DIAGNOSIS — E11.22 TYPE 2 DIABETES MELLITUS WITH STAGE 3A CHRONIC KIDNEY DISEASE, WITHOUT LONG-TERM CURRENT USE OF INSULIN: Primary | ICD-10-CM

## 2020-10-13 DIAGNOSIS — Z12.11 SCREEN FOR COLON CANCER: ICD-10-CM

## 2020-10-13 DIAGNOSIS — E11.69 HYPERLIPIDEMIA ASSOCIATED WITH TYPE 2 DIABETES MELLITUS: ICD-10-CM

## 2020-10-13 DIAGNOSIS — Z00.00 ANNUAL PHYSICAL EXAM: ICD-10-CM

## 2020-10-13 DIAGNOSIS — Z87.891 HISTORY OF TOBACCO ABUSE: ICD-10-CM

## 2020-10-13 DIAGNOSIS — R53.83 FATIGUE, UNSPECIFIED TYPE: ICD-10-CM

## 2020-10-13 PROBLEM — N18.30 CKD (CHRONIC KIDNEY DISEASE) STAGE 3, GFR 30-59 ML/MIN: Chronic | Status: ACTIVE | Noted: 2020-09-24

## 2020-10-13 PROBLEM — E11.29 TYPE 2 DIABETES MELLITUS WITH KIDNEY COMPLICATION, WITHOUT LONG-TERM CURRENT USE OF INSULIN: Chronic | Status: ACTIVE | Noted: 2020-08-22

## 2020-10-13 PROBLEM — M10.9 GOUT: Chronic | Status: ACTIVE | Noted: 2020-08-22

## 2020-10-13 PROCEDURE — 99214 OFFICE O/P EST MOD 30 MIN: CPT | Mod: S$PBB,,, | Performed by: INTERNAL MEDICINE

## 2020-10-13 PROCEDURE — 99999 PR PBB SHADOW E&M-EST. PATIENT-LVL IV: CPT | Mod: PBBFAC,,, | Performed by: INTERNAL MEDICINE

## 2020-10-13 PROCEDURE — 90472 IMMUNIZATION ADMIN EACH ADD: CPT | Mod: PBBFAC,PO

## 2020-10-13 PROCEDURE — 99214 OFFICE O/P EST MOD 30 MIN: CPT | Mod: PBBFAC,PO | Performed by: INTERNAL MEDICINE

## 2020-10-13 PROCEDURE — 90714 TD VACC NO PRESV 7 YRS+ IM: CPT | Mod: PBBFAC,PO

## 2020-10-13 PROCEDURE — 99999 PR PBB SHADOW E&M-EST. PATIENT-LVL IV: ICD-10-PCS | Mod: PBBFAC,,, | Performed by: INTERNAL MEDICINE

## 2020-10-13 PROCEDURE — 99214 PR OFFICE/OUTPT VISIT, EST, LEVL IV, 30-39 MIN: ICD-10-PCS | Mod: S$PBB,,, | Performed by: INTERNAL MEDICINE

## 2020-10-13 RX ORDER — VARICELLA-ZOSTER GE VAC,2 OF 2 50 MCG
1 VIAL (EA) INTRAMUSCULAR ONCE
Qty: 1 EACH | Refills: 1 | Status: SHIPPED | OUTPATIENT
Start: 2020-10-13 | End: 2020-10-13

## 2020-10-13 NOTE — PROGRESS NOTES
Subjective:       Patient ID: Lukasz Person is a 65 y.o. male.    Chief Complaint: Establish Care and Annual Exam    HPI     65 y.o. male here to establish care.     Diabetes - metformin 500 mg BID, amaryl 2 mg.  He does not check his BG.  He is going to get a monitor and start.  Lab Results   Component Value Date    HGBA1C 7.1 (H) 08/20/2020    HGBA1C 8.0 (H) 03/04/2020     Lab Results   Component Value Date    LDLCALC 108.8 03/04/2020    CREATININE 1.2 09/30/2020     HTN -  Patient's co morbidities include: DM. Patient is currently on lisinopril-HCTZ 20-25 mg. He does not check his BP at home. Side effects of medications note: none. Denies headaches, blurred vision, chest pain, shortness of breath, nausea.    HLD - Patient is currently on crestor 20 mg.  His last lipid panel was   Cholesterol   Date Value Ref Range Status   03/04/2020 199 120 - 199 mg/dL Final     Comment:     The National Cholesterol Education Program (NCEP) has set the  following guidelines (reference ranges) for Cholesterol:  Optimal.....................<200 mg/dL  Borderline High.............200-239 mg/dL  High........................> or = 240 mg/dL       Triglycerides   Date Value Ref Range Status   03/04/2020 241 (H) 30 - 150 mg/dL Final     Comment:     The National Cholesterol Education Program (NCEP) has set the  following guidelines (reference values) for triglycerides:  Normal......................<150 mg/dL  Borderline High.............150-199 mg/dL  High........................200-499 mg/dL       HDL   Date Value Ref Range Status   03/04/2020 42 40 - 75 mg/dL Final     Comment:     The National Cholesterol Education Program (NCEP) has set the  following guidelines (reference values) for HDL Cholesterol:  Low...............<40 mg/dL  Optimal...........>60 mg/dL       LDL Cholesterol   Date Value Ref Range Status   03/04/2020 108.8 63.0 - 159.0 mg/dL Final     Comment:     The National Cholesterol Education Program (NCEP) has set  the  following guidelines (reference values) for LDL Cholesterol:  Optimal.......................<130 mg/dL  Borderline High...............130-159 mg/dL  High..........................160-189 mg/dL  Very High.....................>190 mg/dL     .  Side effects of the medication: none.    He takes more frequent naps and feel more tired recently.  He is using his CPAP machine.      Cholesterol: needs  Vaccines: Influenza - done; Tetanus - needs; Pneumovax - needs; Prevnar - 2019; Zoster - needs  Sexual Screening:   STD screening: no concern  Eye exam: done last when he first came here.  Prostate: needs  Colonoscopy: Thinks he had this within a year of leaving [place].  A1c: needs    Exercise: Trying to get back to walking an hour a day for three miles.  Diet: home cooked. Less fast food than it used to be.  Some take out.    Past Medical History:   Diagnosis Date    Diabetes mellitus     Onset late 50s/early 60s    Hyperlipidemia     Hypertension     Onset late 50s/early 60s    Sleep apnea     since 2006     Past Surgical History:   Procedure Laterality Date    CORONARY ANGIOGRAPHY N/A 9/30/2020    Procedure: ANGIOGRAM, CORONARY ARTERY;  Surgeon: John West MD;  Location: [facility] CATH LAB;  Service: Cardiology;  Laterality: N/A;    LEFT HEART CATHETERIZATION Left 9/30/2020    Procedure: Left heart cath;  Surgeon: John West MD;  Location: [facility] CATH LAB;  Service: Cardiology;  Laterality: Left;    LITHOTRIPSY      PARATHYROIDECTOMY  1/1/2-107     Social History     Socioeconomic History    Marital status:      Spouse name: Not on file    Number of children: Not on file    Years of education: Not on file    Highest education level: Not on file   Occupational History    Not on file   Social Needs    Financial resource strain: Not hard at all    Food insecurity     Worry: Never true     Inability: Never true    Transportation needs     Medical: No     Non-medical: No   Tobacco  Use    Smoking status: Former Smoker     Packs/day: 1.00     Years: 7.00     Pack years: 7.00     Types: Cigarettes, Cigars     Start date: 1972     Quit date:      Years since quittin.4    Smokeless tobacco: Never Used    Tobacco comment: smoking was off and on.  cumulative 7 years.  never more than three years in one stretch or more lj   Substance and Sexual Activity    Alcohol use: Yes     Alcohol/week: 3.0 standard drinks     Types: 2 Cans of beer, 1 Shots of liquor per week     Frequency: 2-4 times a month     Drinks per session: 3 or 4     Binge frequency: Less than monthly     Comment: don't drink regularly.  when I do, three beers or shots    Drug use: Not Currently     Types: Marijuana    Sexual activity: Not Currently     Partners: Female     Birth control/protection: None     Comment:    Lifestyle    Physical activity     Days per week: 5 days     Minutes per session: 60 min    Stress: Only a little   Relationships    Social connections     Talks on phone: More than three times a week     Gets together: More than three times a week     Attends Church service: Never     Active member of club or organization: No     Attends meetings of clubs or organizations: Never     Relationship status:    Other Topics Concern    Not on file   Social History Narrative    Not on file     Review of patient's allergies indicates:  No Known Allergies  Lukasz Person had no medications administered during this visit.    Review of Systems      Objective:      Physical Exam  Vitals signs reviewed.   Constitutional:       Appearance: He is well-developed.   HENT:      Head: Normocephalic and atraumatic.      Mouth/Throat:      Pharynx: No oropharyngeal exudate.   Eyes:      General: No scleral icterus.        Right eye: No discharge.         Left eye: No discharge.      Pupils: Pupils are equal, round, and reactive to light.   Neck:      Musculoskeletal: Normal range of motion and neck  supple.      Thyroid: No thyromegaly.      Trachea: No tracheal deviation.   Cardiovascular:      Rate and Rhythm: Normal rate and regular rhythm.      Heart sounds: Normal heart sounds. No murmur. No friction rub. No gallop.    Pulmonary:      Effort: Pulmonary effort is normal. No respiratory distress.      Breath sounds: Normal breath sounds. No wheezing or rales.   Chest:      Chest wall: No tenderness.   Abdominal:      General: Bowel sounds are normal. There is no distension.      Palpations: Abdomen is soft. There is no mass.      Tenderness: There is no abdominal tenderness. There is no guarding or rebound.   Musculoskeletal: Normal range of motion.         General: No tenderness.   Skin:     General: Skin is warm and dry.      Coloration: Skin is not pale.      Findings: No erythema or rash.   Neurological:      Mental Status: He is alert and oriented to person, place, and time.   Psychiatric:         Behavior: Behavior normal.         Assessment:       1. Type 2 diabetes mellitus with stage 3a chronic kidney disease, without long-term current use of insulin    2. Uncontrolled type 2 diabetes mellitus with hyperglycemia    3. Hypertension associated with diabetes    4. Hyperlipidemia associated with type 2 diabetes mellitus    5. Idiopathic gout, unspecified chronicity, unspecified site    6. Need for shingles vaccine    7. Annual physical exam    8. Screen for colon cancer    9. Prostate cancer screening    10. History of tobacco abuse    11. Fatigue, unspecified type    12. Male hypogonadism        Plan:       1/2.  Continue metformin 500 mg b.i.d., Amaryl 2 mg daily.  3.  Continue lisinopril-HCTZ 20-25 mg.  4.  Continue fenofibrate 160 mg, Crestor 20 mg.  5.  Continue allopurinol 100 mg.  6.  Shingles vaccine sent to pharmacy.  7.  Check CMP, TSH, lipids, PSA, testosterone.  Discussed diet and exercise.  Tetanus vaccine and pneumonia vaccine given today.  Shingles vaccine sent to pharmacy.  8.  Cologuard  ordered.  9.  PSA.  10.  AAA screen.  11.  Testosterone.  12.  If testosterone is low, restart testosterone injections for patient.

## 2020-10-16 ENCOUNTER — HOSPITAL ENCOUNTER (OUTPATIENT)
Dept: CARDIOLOGY | Facility: HOSPITAL | Age: 65
Discharge: HOME | End: 2020-10-16
Attending: INTERNAL MEDICINE
Payer: MEDICARE

## 2020-10-16 DIAGNOSIS — Z87.891 HISTORY OF TOBACCO ABUSE: ICD-10-CM

## 2020-10-16 LAB
ABDOMINAL IMA AP: 1.5 CM
ABDOMINAL IMA ED VEL: 0 CM/S
ABDOMINAL IMA PS VEL: 137 CM/S
ABDOMINAL IMA TRANS: 1.6 CM
ABDOMINAL INFRARENAL AORTA AP: 1.61 CM
ABDOMINAL INFRARENAL AORTA ED VEL: 0 CM/S
ABDOMINAL INFRARENAL AORTA PS VEL: 142 CM/S
ABDOMINAL INFRARENAL AORTA TRANS: 1.67 CM
ABDOMINAL JUXTARENAL AORTA AP: 1.82 CM
ABDOMINAL JUXTARENAL AORTA ED VEL: 0 CM/S
ABDOMINAL JUXTARENAL AORTA PS VEL: 130 CM/S
ABDOMINAL JUXTARENAL AORTA TRANS: 1.75 CM
ABDOMINAL LT COM ILIAC AP: 0.9 CM
ABDOMINAL LT COM ILIAC TRANS: 1 CM
ABDOMINAL LT COM ILIAC VEL: 127 CM/S
ABDOMINAL LT COM ILLIAC ED VEL: 0 CM/S
ABDOMINAL RT COM ILIAC AP: 1 CM
ABDOMINAL RT COM ILIAC TRANS: 1.05 CM
ABDOMINAL RT COM ILIAC VEL: 125 CM/S
ABDOMINAL RT COM ILLIAC ED VEL: 0 CM/S
ABDOMINAL SUPRARENAL AORTA AP: 2.23 CM
ABDOMINAL SUPRARENAL AORTA ED VEL: 0 CM/S
ABDOMINAL SUPRARENAL AORTA PS VEL: 148 CM/S
ABDOMINAL SUPRARENAL AORTA TRANS: 2.04 CM

## 2020-10-16 PROCEDURE — 93978 CV US ABDOMINAL AORTA EVALUATION (CUPID ONLY): ICD-10-PCS | Mod: 26,,, | Performed by: INTERNAL MEDICINE

## 2020-10-16 PROCEDURE — 93978 VASCULAR STUDY: CPT

## 2020-10-16 PROCEDURE — 93978 VASCULAR STUDY: CPT | Mod: 26,,, | Performed by: INTERNAL MEDICINE

## 2020-10-17 ENCOUNTER — LAB VISIT (OUTPATIENT)
Dept: LAB | Facility: HOSPITAL | Age: 65
End: 2020-10-17
Attending: INTERNAL MEDICINE
Payer: MEDICARE

## 2020-10-17 DIAGNOSIS — Z00.00 ANNUAL PHYSICAL EXAM: ICD-10-CM

## 2020-10-17 DIAGNOSIS — N18.31 TYPE 2 DIABETES MELLITUS WITH STAGE 3A CHRONIC KIDNEY DISEASE, WITHOUT LONG-TERM CURRENT USE OF INSULIN: ICD-10-CM

## 2020-10-17 DIAGNOSIS — E78.5 HYPERLIPIDEMIA ASSOCIATED WITH TYPE 2 DIABETES MELLITUS: ICD-10-CM

## 2020-10-17 DIAGNOSIS — Z23 NEED FOR SHINGLES VACCINE: ICD-10-CM

## 2020-10-17 DIAGNOSIS — Z12.11 SCREEN FOR COLON CANCER: ICD-10-CM

## 2020-10-17 DIAGNOSIS — E11.69 HYPERLIPIDEMIA ASSOCIATED WITH TYPE 2 DIABETES MELLITUS: ICD-10-CM

## 2020-10-17 DIAGNOSIS — E11.22 TYPE 2 DIABETES MELLITUS WITH STAGE 3A CHRONIC KIDNEY DISEASE, WITHOUT LONG-TERM CURRENT USE OF INSULIN: ICD-10-CM

## 2020-10-17 DIAGNOSIS — E11.65 UNCONTROLLED TYPE 2 DIABETES MELLITUS WITH HYPERGLYCEMIA: ICD-10-CM

## 2020-10-17 DIAGNOSIS — E29.1 MALE HYPOGONADISM: ICD-10-CM

## 2020-10-17 DIAGNOSIS — I15.2 HYPERTENSION ASSOCIATED WITH DIABETES: ICD-10-CM

## 2020-10-17 DIAGNOSIS — E11.59 HYPERTENSION ASSOCIATED WITH DIABETES: ICD-10-CM

## 2020-10-17 DIAGNOSIS — Z12.5 PROSTATE CANCER SCREENING: ICD-10-CM

## 2020-10-17 LAB
ALBUMIN SERPL BCP-MCNC: 4.2 G/DL (ref 3.5–5.2)
ALP SERPL-CCNC: 43 U/L (ref 55–135)
ALT SERPL W/O P-5'-P-CCNC: 22 U/L (ref 10–44)
ANION GAP SERPL CALC-SCNC: 10 MMOL/L (ref 8–16)
AST SERPL-CCNC: 28 U/L (ref 10–40)
BILIRUB SERPL-MCNC: 0.3 MG/DL (ref 0.1–1)
BUN SERPL-MCNC: 24 MG/DL (ref 8–23)
CALCIUM SERPL-MCNC: 9.8 MG/DL (ref 8.7–10.5)
CHLORIDE SERPL-SCNC: 102 MMOL/L (ref 95–110)
CHOLEST SERPL-MCNC: 106 MG/DL (ref 120–199)
CHOLEST/HDLC SERPL: 2.7 {RATIO} (ref 2–5)
CO2 SERPL-SCNC: 27 MMOL/L (ref 23–29)
COMPLEXED PSA SERPL-MCNC: 0.6 NG/ML (ref 0–4)
CREAT SERPL-MCNC: 1.3 MG/DL (ref 0.5–1.4)
EST. GFR  (AFRICAN AMERICAN): >60 ML/MIN/1.73 M^2
EST. GFR  (NON AFRICAN AMERICAN): 56.9 ML/MIN/1.73 M^2
GLUCOSE SERPL-MCNC: 125 MG/DL (ref 70–110)
HDLC SERPL-MCNC: 39 MG/DL (ref 40–75)
HDLC SERPL: 36.8 % (ref 20–50)
LDLC SERPL CALC-MCNC: 35 MG/DL (ref 63–159)
NONHDLC SERPL-MCNC: 67 MG/DL
POTASSIUM SERPL-SCNC: 4.5 MMOL/L (ref 3.5–5.1)
PROT SERPL-MCNC: 7.2 G/DL (ref 6–8.4)
SODIUM SERPL-SCNC: 139 MMOL/L (ref 136–145)
TESTOST SERPL-MCNC: 244 NG/DL (ref 304–1227)
TRIGL SERPL-MCNC: 160 MG/DL (ref 30–150)
TSH SERPL DL<=0.005 MIU/L-ACNC: 2.52 UIU/ML (ref 0.4–4)

## 2020-10-17 PROCEDURE — 84403 ASSAY OF TOTAL TESTOSTERONE: CPT

## 2020-10-17 PROCEDURE — 84443 ASSAY THYROID STIM HORMONE: CPT

## 2020-10-17 PROCEDURE — 36415 COLL VENOUS BLD VENIPUNCTURE: CPT | Mod: PO

## 2020-10-17 PROCEDURE — 80061 LIPID PANEL: CPT

## 2020-10-17 PROCEDURE — 84153 ASSAY OF PSA TOTAL: CPT

## 2020-10-17 PROCEDURE — 80053 COMPREHEN METABOLIC PANEL: CPT

## 2020-10-18 ENCOUNTER — TELEPHONE (OUTPATIENT)
Dept: INTERNAL MEDICINE | Facility: CLINIC | Age: 65
End: 2020-10-18

## 2020-10-18 DIAGNOSIS — E29.1 HYPOGONADISM IN MALE: Primary | ICD-10-CM

## 2020-10-18 RX ORDER — TESTOSTERONE ENANTHATE 200 MG/ML
200 VIAL (ML) INTRAMUSCULAR
Qty: 1 ML | Refills: 0 | Status: SHIPPED | OUTPATIENT
Start: 2020-10-18 | End: 2021-04-13 | Stop reason: SDUPTHER

## 2020-10-18 NOTE — TELEPHONE ENCOUNTER
Your thyroid function, prostate, electrolytes, kidney/liver function, cholesterol are normal.  Your testosterone level is low.  I am going to restart your testosterone injections.  Please be sure to keep your follow-up as scheduled. I am going to order recheck of testosterone labs in a month to ensure you are adequately replaced.

## 2020-10-19 RX ORDER — POTASSIUM CITRATE 10 MEQ/1
TABLET, EXTENDED RELEASE ORAL
Qty: 60 TABLET | Refills: 6 | Status: SHIPPED | OUTPATIENT
Start: 2020-10-19 | End: 2021-06-11

## 2020-10-24 ENCOUNTER — PATIENT MESSAGE (OUTPATIENT)
Dept: INTERNAL MEDICINE | Facility: CLINIC | Age: 65
End: 2020-10-24

## 2020-10-28 ENCOUNTER — TELEPHONE (OUTPATIENT)
Dept: INTERNAL MEDICINE | Facility: CLINIC | Age: 65
End: 2020-10-28

## 2020-10-28 NOTE — TELEPHONE ENCOUNTER
Spoke with pharmacist, per Dr Steve shay to dispense 5ml bottle as medication doesn't come in 3ml bottle.

## 2020-10-28 NOTE — TELEPHONE ENCOUNTER
----- Message from Marcia Benites sent at 10/26/2020  3:55 PM CDT -----  Contact: lianet [phone]  Pharmacy calling to follow up on previous fax that was sent to office regarding medication testosterone enanthate (DELATESTRYL) 200 mg/mL injection

## 2020-11-03 RX ORDER — METFORMIN HYDROCHLORIDE 500 MG/1
TABLET ORAL
Qty: 120 TABLET | Refills: 10 | Status: SHIPPED | OUTPATIENT
Start: 2020-11-03 | End: 2021-12-07

## 2020-11-03 RX ORDER — FENOFIBRATE 160 MG/1
TABLET ORAL
Qty: 30 TABLET | Refills: 10 | Status: SHIPPED | OUTPATIENT
Start: 2020-11-03 | End: 2021-10-29

## 2020-11-03 RX ORDER — ALLOPURINOL 100 MG/1
TABLET ORAL
Qty: 30 TABLET | Refills: 10 | Status: SHIPPED | OUTPATIENT
Start: 2020-11-03 | End: 2021-09-04

## 2020-11-10 ENCOUNTER — PATIENT MESSAGE (OUTPATIENT)
Dept: INTERNAL MEDICINE | Facility: CLINIC | Age: 65
End: 2020-11-10

## 2020-11-13 ENCOUNTER — TELEPHONE (OUTPATIENT)
Dept: INTERNAL MEDICINE | Facility: CLINIC | Age: 65
End: 2020-11-13

## 2020-11-20 ENCOUNTER — PATIENT MESSAGE (OUTPATIENT)
Dept: INTERNAL MEDICINE | Facility: CLINIC | Age: 65
End: 2020-11-20

## 2020-11-21 ENCOUNTER — PATIENT MESSAGE (OUTPATIENT)
Dept: INTERNAL MEDICINE | Facility: CLINIC | Age: 65
End: 2020-11-21

## 2020-11-25 ENCOUNTER — TELEPHONE (OUTPATIENT)
Dept: INTERNAL MEDICINE | Facility: CLINIC | Age: 65
End: 2020-11-25

## 2020-11-25 NOTE — TELEPHONE ENCOUNTER
After discussion, patient decided against Cologuard Perma recommendations.  We need to get colonoscopy report, and follow-up with colonoscopies.

## 2020-12-05 ENCOUNTER — LAB VISIT (OUTPATIENT)
Dept: LAB | Facility: HOSPITAL | Age: 65
End: 2020-12-05
Attending: INTERNAL MEDICINE
Payer: MEDICARE

## 2020-12-05 ENCOUNTER — PATIENT MESSAGE (OUTPATIENT)
Dept: INTERNAL MEDICINE | Facility: CLINIC | Age: 65
End: 2020-12-05

## 2020-12-05 DIAGNOSIS — E11.69 HYPERLIPIDEMIA ASSOCIATED WITH TYPE 2 DIABETES MELLITUS: ICD-10-CM

## 2020-12-05 DIAGNOSIS — M10.9 GOUT, UNSPECIFIED CAUSE, UNSPECIFIED CHRONICITY, UNSPECIFIED SITE: ICD-10-CM

## 2020-12-05 DIAGNOSIS — E78.5 HYPERLIPIDEMIA ASSOCIATED WITH TYPE 2 DIABETES MELLITUS: ICD-10-CM

## 2020-12-05 DIAGNOSIS — E11.9 TYPE 2 DIABETES MELLITUS WITHOUT COMPLICATION, WITH LONG-TERM CURRENT USE OF INSULIN: ICD-10-CM

## 2020-12-05 DIAGNOSIS — Z79.4 TYPE 2 DIABETES MELLITUS WITHOUT COMPLICATION, WITH LONG-TERM CURRENT USE OF INSULIN: ICD-10-CM

## 2020-12-05 LAB
ALBUMIN SERPL BCP-MCNC: 4.1 G/DL (ref 3.5–5.2)
ALP SERPL-CCNC: 45 U/L (ref 55–135)
ALT SERPL W/O P-5'-P-CCNC: 29 U/L (ref 10–44)
ANION GAP SERPL CALC-SCNC: 10 MMOL/L (ref 8–16)
AST SERPL-CCNC: 32 U/L (ref 10–40)
BILIRUB SERPL-MCNC: 0.3 MG/DL (ref 0.1–1)
BUN SERPL-MCNC: 19 MG/DL (ref 8–23)
CALCIUM SERPL-MCNC: 8.9 MG/DL (ref 8.7–10.5)
CHLORIDE SERPL-SCNC: 102 MMOL/L (ref 95–110)
CHOLEST SERPL-MCNC: 114 MG/DL (ref 120–199)
CHOLEST/HDLC SERPL: 2.7 {RATIO} (ref 2–5)
CO2 SERPL-SCNC: 29 MMOL/L (ref 23–29)
CREAT SERPL-MCNC: 1.3 MG/DL (ref 0.5–1.4)
EST. GFR  (AFRICAN AMERICAN): >60 ML/MIN/1.73 M^2
EST. GFR  (NON AFRICAN AMERICAN): 56.9 ML/MIN/1.73 M^2
ESTIMATED AVG GLUCOSE: 148 MG/DL (ref 68–131)
GLUCOSE SERPL-MCNC: 127 MG/DL (ref 70–110)
HBA1C MFR BLD HPLC: 6.8 % (ref 4–5.6)
HDLC SERPL-MCNC: 42 MG/DL (ref 40–75)
HDLC SERPL: 36.8 % (ref 20–50)
LDLC SERPL CALC-MCNC: 41 MG/DL (ref 63–159)
NONHDLC SERPL-MCNC: 72 MG/DL
POTASSIUM SERPL-SCNC: 4.9 MMOL/L (ref 3.5–5.1)
PROT SERPL-MCNC: 7.1 G/DL (ref 6–8.4)
SODIUM SERPL-SCNC: 141 MMOL/L (ref 136–145)
TRIGL SERPL-MCNC: 155 MG/DL (ref 30–150)

## 2020-12-05 PROCEDURE — 83036 HEMOGLOBIN GLYCOSYLATED A1C: CPT

## 2020-12-05 PROCEDURE — 80053 COMPREHEN METABOLIC PANEL: CPT

## 2020-12-05 PROCEDURE — 36415 COLL VENOUS BLD VENIPUNCTURE: CPT | Mod: PO

## 2020-12-05 PROCEDURE — 80061 LIPID PANEL: CPT

## 2020-12-07 ENCOUNTER — PATIENT MESSAGE (OUTPATIENT)
Dept: INTERNAL MEDICINE | Facility: CLINIC | Age: 65
End: 2020-12-07

## 2020-12-11 ENCOUNTER — PATIENT MESSAGE (OUTPATIENT)
Dept: OTHER | Facility: OTHER | Age: 65
End: 2020-12-11

## 2020-12-12 ENCOUNTER — PATIENT MESSAGE (OUTPATIENT)
Dept: INTERNAL MEDICINE | Facility: CLINIC | Age: 65
End: 2020-12-12

## 2020-12-15 ENCOUNTER — CLINICAL SUPPORT (OUTPATIENT)
Dept: INTERNAL MEDICINE | Facility: CLINIC | Age: 65
End: 2020-12-15
Payer: MEDICARE

## 2020-12-18 ENCOUNTER — OFFICE VISIT (OUTPATIENT)
Dept: INTERNAL MEDICINE | Facility: CLINIC | Age: 65
End: 2020-12-18
Payer: MEDICARE

## 2020-12-18 VITALS
TEMPERATURE: 98 F | SYSTOLIC BLOOD PRESSURE: 138 MMHG | WEIGHT: 265.63 LBS | OXYGEN SATURATION: 98 % | DIASTOLIC BLOOD PRESSURE: 80 MMHG | HEART RATE: 61 BPM | BODY MASS INDEX: 35.98 KG/M2 | HEIGHT: 72 IN | RESPIRATION RATE: 16 BRPM

## 2020-12-18 DIAGNOSIS — E11.69 OBESITY, DIABETES, AND HYPERTENSION SYNDROME: ICD-10-CM

## 2020-12-18 DIAGNOSIS — E78.5 HYPERLIPIDEMIA ASSOCIATED WITH TYPE 2 DIABETES MELLITUS: Chronic | ICD-10-CM

## 2020-12-18 DIAGNOSIS — I15.2 HYPERTENSION ASSOCIATED WITH DIABETES: Chronic | ICD-10-CM

## 2020-12-18 DIAGNOSIS — E11.59 HYPERTENSION ASSOCIATED WITH DIABETES: Chronic | ICD-10-CM

## 2020-12-18 DIAGNOSIS — E66.01 SEVERE OBESITY (BMI 35.0-35.9 WITH COMORBIDITY): ICD-10-CM

## 2020-12-18 DIAGNOSIS — N18.30 STAGE 3 CHRONIC KIDNEY DISEASE, UNSPECIFIED WHETHER STAGE 3A OR 3B CKD: Chronic | ICD-10-CM

## 2020-12-18 DIAGNOSIS — I15.2 OBESITY, DIABETES, AND HYPERTENSION SYNDROME: ICD-10-CM

## 2020-12-18 DIAGNOSIS — G47.33 OSA ON CPAP: ICD-10-CM

## 2020-12-18 DIAGNOSIS — E11.65 TYPE 2 DIABETES MELLITUS WITH HYPERGLYCEMIA, WITHOUT LONG-TERM CURRENT USE OF INSULIN: Primary | ICD-10-CM

## 2020-12-18 DIAGNOSIS — E11.59 OBESITY, DIABETES, AND HYPERTENSION SYNDROME: ICD-10-CM

## 2020-12-18 DIAGNOSIS — E21.0 HYPERPARATHYROIDISM, PRIMARY: ICD-10-CM

## 2020-12-18 DIAGNOSIS — E11.69 HYPERLIPIDEMIA ASSOCIATED WITH TYPE 2 DIABETES MELLITUS: Chronic | ICD-10-CM

## 2020-12-18 DIAGNOSIS — E66.9 OBESITY, DIABETES, AND HYPERTENSION SYNDROME: ICD-10-CM

## 2020-12-18 PROCEDURE — 99214 PR OFFICE/OUTPT VISIT, EST, LEVL IV, 30-39 MIN: ICD-10-PCS | Mod: S$PBB,,, | Performed by: NURSE PRACTITIONER

## 2020-12-18 PROCEDURE — 99999 PR PBB SHADOW E&M-EST. PATIENT-LVL IV: CPT | Mod: PBBFAC,,, | Performed by: NURSE PRACTITIONER

## 2020-12-18 PROCEDURE — 99214 OFFICE O/P EST MOD 30 MIN: CPT | Mod: S$PBB,,, | Performed by: NURSE PRACTITIONER

## 2020-12-18 PROCEDURE — 99214 OFFICE O/P EST MOD 30 MIN: CPT | Mod: PBBFAC,PO | Performed by: NURSE PRACTITIONER

## 2020-12-18 PROCEDURE — 99999 PR PBB SHADOW E&M-EST. PATIENT-LVL IV: ICD-10-PCS | Mod: PBBFAC,,, | Performed by: NURSE PRACTITIONER

## 2020-12-18 RX ORDER — INSULIN PUMP SYRINGE, 3 ML
EACH MISCELLANEOUS
Qty: 1 EACH | Refills: 12 | Status: SHIPPED | OUTPATIENT
Start: 2020-12-18 | End: 2021-01-04 | Stop reason: SDUPTHER

## 2020-12-18 RX ORDER — LANCETS
1 EACH MISCELLANEOUS DAILY
Qty: 50 EACH | Refills: 3 | Status: SHIPPED | OUTPATIENT
Start: 2020-12-18 | End: 2020-12-29 | Stop reason: SDUPTHER

## 2020-12-18 NOTE — PROGRESS NOTES
65 y.o. gentleman, here for 3 month follow up visit for T2dm.   Last seen 9/11/2020 - those notes below.     a1c slightly improved from 7.1%---> 6.8%.   Continues on metformin 1000 bid.   On glimepiride 2mg in am with breakfast . Had been taking in evening before and I switched him to morning time to help cover prandial highs early on in the day.   This seems to have helped.   He is trying to follow ADA diet.   Lipids - at goal. Now has restarted his crestor 20mg every Hs.   BP controlled on lisinopril/hctz.   No acute complaints today's visit.       Last visit notes from 9/11/2020 as follows:   HPI: Lukasz Person is a 65 y.o.  male c/I for visit to address Diabetes Type 2  This is the first time I am seeing this patient.   Does not have a pcp, but getting established with Dr. Wilson next month.     was diagnosed with T2DM about 10 years ago.   Has never been hospitalized r/t DM.  Taking metformin 1000mg po bid and also on glimepiride 2mg tablets - taking 2 after dinner.   Denies missing doses of DM medication.   Current a1c is controlled at 7.1%.   Coming in today to establish care, and get further recommendations for management of his diabetes.   He is very motivated to lose weight, eat healthier and manage his over all health more effectively.     Past medical History:   Past Medical History:   Diagnosis Date    Diabetes mellitus     Onset late 50s/early 60s    Hyperlipidemia     Hypertension     Onset late 50s/early 60s    Sleep apnea     since 2006      Family hx:   Family History   Problem Relation Age of Onset    Heart disease Father 70        CABG    Arthritis Father     Diabetes Father     Kidney disease Father         had one kidney removed in early thirties    Arthritis Mother     Colon cancer Brother 32    Diabetes Paternal Grandmother     Colon polyps Paternal Grandfather     Colon cancer Paternal Grandfather     Cancer Paternal Grandfather         colon cancer at age 62    Alcohol abuse  Paternal Aunt     Arthritis Sister     Arthritis Sister     Arthritis Brother     Cancer Brother         colon cancer at age 32    Cancer Maternal Grandfather         throat cancer    Hypertension Sister       Current meds:   Current Outpatient Medications:     allopurinoL (ZYLOPRIM) 100 MG tablet, TAKE 1 TABLET BY MOUTH ONCE DAILY, Disp: 30 tablet, Rfl: 10    aspirin (ECOTRIN) 81 MG EC tablet, Take 1 tablet (81 mg total) by mouth once daily., Disp: 1 tablet, Rfl: 0    fenofibrate 160 MG Tab, TAKE 1 TABLET(160 MG) BY MOUTH EVERY DAY, Disp: 30 tablet, Rfl: 10    glimepiride (AMARYL) 2 MG tablet, TAKE 2 TABLETS BY MOUTH EVERY MORNING, Disp: 60 tablet, Rfl: 3    lisinopriL-hydrochlorothiazide (PRINZIDE,ZESTORETIC) 20-25 mg Tab, TAKE 1 TABLET BY MOUTH EVERY DAY, Disp: 30 tablet, Rfl: 3    metFORMIN (GLUCOPHAGE) 500 MG tablet, TAKE 2 TABLETS BY MOUTH EVERY MORNING AND 2 WITH DINNER, Disp: 120 tablet, Rfl: 10    potassium citrate (UROCIT-K) 10 mEq (1,080 mg) TbSR, TAKE 1 TABLET BY MOUTH TWICE DAILY, Disp: 60 tablet, Rfl: 6    rosuvastatin (CRESTOR) 20 MG tablet, Take 1 tablet (20 mg total) by mouth once daily., Disp: 30 tablet, Rfl: 11    testosterone enanthate (DELATESTRYL) 200 mg/mL injection, Inject 1 mL (200 mg total) into the muscle every 28 days., Disp: 1 mL, Rfl: 0    blood sugar diagnostic Strp, 1 strip by Misc.(Non-Drug; Combo Route) route every morning., Disp: 50 each, Rfl: 3    blood-glucose meter kit, Checks blood sugars 1x/daily., Disp: 1 each, Rfl: 12    lancets (ACCU-CHEK SOFTCLIX LANCETS) Misc, 1 each by Misc.(Non-Drug; Combo Route) route once daily., Disp: 50 each, Rfl: 3     Current Diabetes medications:   Metformin   Amaryl.     Review of Pertinent co-morbidities/risk factors:   CV: Denies history of MI nor stroke.   CAD: Denies.  Does not take aspirin 81mg tablet daily  BP: has history of HTN  Statin: Taking  ACE/ARB: Taking    Social History     Tobacco Use   Smoking Status Former  Smoker    Packs/day: 1.00    Years: 7.00    Pack years: 7.00    Types: Cigarettes, Cigars    Start date: 1972    Quit date:     Years since quittin.6   Smokeless Tobacco Never Used   Tobacco Comment    smoking was off and on.  cumulative 7 years.  never more than three years in one stretch or more lj      Social:   Lives at home with wife.   Life changes/stressors currently: moved from [place] about 1 year ago.   Diet: not always following ADA diet   Meals: 3 per day and snacks.        Breakfast - eggs, bagel, toast. Cereal with granola, banana.       Lunch - sandwich- turkey or ham       Dinner - tuna fish, stir garcia vegetables with shrimp, orka, lynn peppers, steak        Snacks - chips, crackers         Drinks - tea, water  Exercise: not currently, trying to get back into walking - used to walk 3 miles per day.   Activities: retired.     Glucose Monitoring:   Not checking sugars.     Standards of care:   Eyes: .: 2020  Foot exam: : 2020   Diabetes education: None.    Vital Signs  /80 (BP Location: Right arm, Patient Position: Sitting, BP Method: Large (Manual))   Pulse 61   Temp 97.8 °F (36.6 °C) (Temporal)   Resp 16   Ht 6' (1.829 m)   Wt 120.5 kg (265 lb 10.5 oz)   SpO2 98%   BMI 36.03 kg/m²     Pertinent Labs:   Hgba1c   Lab Results   Component Value Date    HGBA1C 6.8 (H) 2020     Lipid panel   Lab Results   Component Value Date    CHOL 114 (L) 2020    CHOL 106 (L) 10/17/2020    CHOL 199 2020     Lab Results   Component Value Date    HDL 42 2020    HDL 39 (L) 10/17/2020    HDL 42 2020     Lab Results   Component Value Date    LDLCALC 41.0 (L) 2020    LDLCALC 35.0 (L) 10/17/2020    LDLCALC 108.8 2020     Lab Results   Component Value Date    TRIG 155 (H) 2020    TRIG 160 (H) 10/17/2020    TRIG 241 (H) 2020     Lab Results   Component Value Date    CHOLHDL 36.8 2020    CHOLHDL 36.8 10/17/2020    CHOLHDL 21.1  03/04/2020      CMP  Glucose   Date Value Ref Range Status   12/05/2020 127 (H) 70 - 110 mg/dL Final     BUN   Date Value Ref Range Status   12/05/2020 19 8 - 23 mg/dL Final     Creatinine   Date Value Ref Range Status   12/05/2020 1.3 0.5 - 1.4 mg/dL Final     eGFR if    Date Value Ref Range Status   12/05/2020 >60.0 >60 mL/min/1.73 m^2 Final     eGFR if non    Date Value Ref Range Status   12/05/2020 56.9 (A) >60 mL/min/1.73 m^2 Final     Comment:     Calculation used to obtain the estimated glomerular filtration  rate (eGFR) is the CKD-EPI equation.         Microalbumin creatinine ratio:   Lab Results   Component Value Date    MICALBCREAT 13.3 10/17/2020       Review Of Systems:   Gen: Appetite good, no weight gain or loss, denies fatigue and weakness. Denies polydipsia.  Skin: Skin is intact and heals well, denies any rashes or hair changes.   Eyes: Denies any acute visual disturbances, nor blurred vision.   Resp: Denies SOB or Dyspnea on exertion, denies cough.   Cardiac: Denies chest pain, palpitations, or swelling.   GI: Denies abdominal pain, nausea or vomiting, diarrhea, or constipation.   /GYN: Denies nocturia, nor burning, frequency or pain on urination.  MS/Neuro: Denies numbness/ tingling in BLE; Gait steady, speech clear - denies headaches.   Psych: Denies drug/ETOH abuse, no hx of depression.  Other systems: negative.    Physical Exam:   GENERAL: Well developed, well nourished in appearance.   PSYCH: AAOx3, appropriate mood and affect, pleasant expression, conversant, appears relaxed, well groomed.   EYES: PERRL, Conjunctiva and corneas clear  NECK: Soft and Supple, trachea midline,   CHEST: Even, regular, and unlabored respirations  ABDOMEN: Soft, non-tender  VASCULAR: pedal pulses palpable bilaterally, no edema.  NEURO:  cranial nerves II - XII intact   MUSCULOSKELETAL: Good ROM, steady gait.   SKIN: Skin warm, dry, and intact   FEET: Pedal pulses palpable and  intact.     Assessment and Plan of Care:     Lukasz was seen today for diabetes.    Diagnoses and all orders for this visit:    Type 2 diabetes mellitus with hyperglycemia, without long-term current use of insulin  -     Vitamin B12; Future    Stage 3 chronic kidney disease, unspecified whether stage 3a or 3b CKD  -     Comprehensive Metabolic Panel; Future    Hypertension associated with diabetes  -     Microalbumin/Creatinine Ratio, Urine; Future    Hyperlipidemia associated with type 2 diabetes mellitus  -     Lipid Panel; Future    Hyperparathyroidism, primary    Severe obesity (BMI 35.0-35.9 with comorbidity)    KUMAR on CPAP    Obesity, diabetes, and hypertension syndrome  -     Hemoglobin A1C; Future    Other orders  -     blood-glucose meter kit; Checks blood sugars 1x/daily.  -     blood sugar diagnostic Strp; 1 strip by Misc.(Non-Drug; Combo Route) route every morning.  -     lancets (ACCU-CHEK SOFTCLIX LANCETS) Misc; 1 each by Misc.(Non-Drug; Combo Route) route once daily.    1. T2DM with hyperglycemia- Hgba1c goal is 7.5% or less without hypoglycemia - at 7.1%--> 6.8%. improved and meets goal.   For now:   Continue metformin 1000mg po bid.   Advised men's multivitamin with b12 supplement, B12 depletion.   Change the amaryl from after dinner to taking with breakfast instead. This should help bring down the breakfast and lunch time bg's.   He was taking the amaryl after dinner, which was wrong (could bring down sugars too low at bedtime) - advised should be taken with a  Meal earlier in the day.   discussed DM, progression of disease, long term complications, CV risk factors and tx options.   Advise compliance with ADA diet and encourage exercise- gave him a dietary plan/handout/food list.   Eyes - reported recent exam - outside provider - will ask for/obtain those records.     2. HTN- controlled, continue meds as previously prescribed and monitor.   Lisinopril/hctz - get new Urine MAC.   Addendum -  9/14/2020 - resulted urine mac wnl. Stable.     3. Hyperlipidemia - LDL goal < 100. Just was told his cholesterol was high,   and began him on crestor 20mg every night.   He hasn't started taking it yet, but I advised him to begin now and explained CV risk factors as they relate to diabetes.     4. Weight - BMI Body mass index is 36.03 kg/m².   Encourage Ada diet and exercise.     5. Gout - on allopurinol 100mg daily - no recent flare ups.   Get new uric acid level at baseline.     6. CKD - stage 2 - 3 - will monitor trends -   Also reports history of frequent kidney stones. Nothing recent, should be resolved since overactive parathyroid gland removed.   urine MAC is stable Wnl.     7. Hyperparathyroidism - s/p removal of 1 gland - had been found incidentally when he kept having recurring kidney stones.   Should be resolved now - will get new PTH level to re-assess.     8. KUMAR - managed - had sleep MD appt. Yesterday, wearing cpap at night no issues.      9. History of kidney stones - has been thought secondary to the hyperparathyroidism.   Is taking potassium -citrate to prevent. I suggested to call his urologist if he would like to stop, as his over-active parathyroid has been removed.     10. Low T - taking Testosterone injections. Feels more energy. Defer - managed per dr. Wilson.         Follow up in 6 months with OV and labs prior

## 2020-12-18 NOTE — PATIENT INSTRUCTIONS
"Continue metformin 1000 mg twice per day.   Continue on glimepiride 2mg with breakfast.   Continue diabetic diet.     Low Carb Snacks & Diabetes friendly foods   Aim for 30 - 40 grams of carbs for 3 meals per day (or 2 meals and  1 - 2 snacks - however you prefer)  Snacks can be 15 - 20 grams of carbs.     Eating small, frequent meals will help you to feel more satisfied, and more full so that you don't over eat or eat the wrong foods later.   Also, paxton meals/snacks allow you to spread carbohydrates evenly, which may stabilize blood sugars.   Below you will find a list of ideas of foods that I like/prefer.   Feel free to give me your ideas and tips if you find good ones too!   Overall - please remember to limit refined sugars such as soft drinks, juices, rices, pastas, breads, cakes.   Look at the back of the label - look at the amount of "carbohydrates", then look at the amount of "fiber" - subtract the amount of fiber from the amount of carbs - this is your "net carb intake".  The more fiber a food has, the better generally, as you get to subtract this from the net carbs.     0-5 gm carb   Crystal Light flavoring (I like fruit punch flavor!)   Vitamin Water Zero   Gwen antioxidant drinks.    Sparkling ice (long skinny flavored water bottles for $1 that are sugar free).    Rayne water, WaterLoo - carbonated flavored conway, various flavors.   Diet coke, diet barqs, sprite zero.   Diet sunkist (orange drink)   Sugar free powerade   Herbal tea, unsweetened   2 tsp peanut butter on celery or carrots   Caleb's original Candies - (the Sugar Free ones!)   1/2 cup sugar-free jell-o   1 sugar-free popsicle   ¼ cup blueberries or strawberries   8oz Blue Jinny unsweetened almond milk (or there is sugar free vanilla flavored as well).   5 baby carrots & celery sticks, cucumbers, bell peppers dipped in ¼ cup salsa, 2Tbsp light ranch dressing or 2Tbsp plain Greek yogurt   10 Goldfish crackers   ½ oz " "low-fat cheese or string cheese   1 closed handful of nuts, unsalted (example-->almonds, pistachios, cashews, peanuts, etc).   1 Tbsp of sunflower seeds, unsalted   1 cup Smart Pop popcorn   1 whole grain brown rice cake    "Think Thin" protein bars - my personal favorite is Creamy Peanut butter, chocolate brownie, or oreo flavors.   "Mcneal" bars  - can be found at [employer] grocery store - they have 5 grams of net carbohydrates.   Quest bars (my favorite is birthday cake, and also cinnamon roll is good melted for 10 seconds in the microwave - please remove the wrapper first!)   Premier protein shakes - sold at [employer], or other brand alternatives - usually 1 - 2 grams of carbs (strawberry, vanilla, chocolate flavors) Coffee flavor is my new morning favorite!    Scrambled eggs! Or a fried egg or boiled eggs - add sliced tomatoes, cilantro or some chopped green onions.    Eggs are good with any and all veggies! You can even eat them for dinner or in a low carb tortilla, or served with your favorite grilled meat/sausage or perry is my favorite.    Veggiliana azalea - zucchini - can buy in the frozen foods section. Birds eye brand is usually cheap. Tip - saute with oil/onions or italian seasoning and use this as a pasta substitution.   Milk - is usually high in carbs/sugar - use [employer] milk brand instead - it is "filtered" milk - with half the amount of carbs of regular milk. (next to the milk in milk aisle).    Smuckers sugar free Breakfast syrup (or 1 tablespoon of low sugar breakfast syrup) instead of regular syrup.        15 gm carb   1 small piece of fruit or ½ banana or 1/2 cup lite canned fruit   3 miguelangel cracker squares   3 cups Smart Pop popcorn, top spray butter, Nuno lite salt or cinnamon and Truvia   5 Vanilla Wafers   ½ cup low fat, no added sugar ice cream or frozen yogurt (Blue bell, Blue Bunny, Weight Watchers, Skinny Cow)   1/2 - 1 cup Light n' fit Vanilla yogurt (has added protein " "in it to make you feel full).    ½ turkey, ham, or chicken sandwich   ½ c fruit with ½ c Cottage cheese   4-6 unsalted wheat crackers with 1 oz low fat cheese or 1 tbsp peanut butter    30-45 goldfish crackers (depending on flavor)    7-8 Latter day mini brown rice cakes (caramel, apple cinnamon, chocolate)    12 Latter day mini brown rice cakes (cheddar, bbq, ranch)    1/3 cup hummus dip with raw veg   1/2 whole wheat murtaza, 1Tbsp hummus   Mini Pizza (1/2 whole wheat English muffin, low-fat  cheese, tomato sauce)   100 calorie snack pack (Oreo, Chips Ahoy, Ritz Mix, Baked Cheetos)   4-6 oz. light or Greek Style yogurt (Chobani, Yoplait, Okios, Stoneyfield)   ½ cup sugar-free pudding     6 in. wheat tortilla or murtaza oven toasted chips (topped with spray butter flavoring, cinnamon, Truvia OR spray butter, garlic powder, chili powder)    18 BBQ Popchips (available at [employer], [employer], [employer])   Mini bagel (small size) toasted - add fat free cream cheese or avocado and sliced tomato on top - yum!    1/2 cup Halo top icecream - birthday cake is my go-to flavor.    Truth Bars - can be found at [employer] - Net carbs is 11 - 12 grams depending on the flavor.    Kind bars = mostly nuts and dried berries - find at most local groceries stores, drug stores, whole foods, fresh market. Net carbs is around 10 grams.     Smoothie Nestor - I'm often asked "what smoothie is healthy for me".   Do not be decieved to think that all smoothies are "healthy". In fact, most are loaded with hidden sugars.   Here are some from the menu that you are allowed to have being diabetic:   The gladiator - any flavor.   Keto champ (berry, chocolate or coffee - all have 10 grams of carbs).   The Lean 1 smoothies all have 15 - 20 grams of carbs, so this is slightly more. But would be ok for a meal substitute or snack.   The Shredder in Vanilla or chocolate only. (the strawberry has more sugar considerably)    Tips and Tricks:     Eat " "an extra vegetable once per day - green veggies (such as broccoli, cauliflower, green beans) - make you feel full and are low in sugar.     My favorite "secret" seasoning to make things extra yummy is cinnamon for sweet taste   For an added secret "salty" taste - add "everything but the bagel" seasoning (you can get on amazon.com or at  joes. I have seen at local groceries such as [employer] too!).     Dark colored fruits are your friend -- blueberries, strawberries, raspberries, blackberries. They have a lower sugar content. So will be the best choice for you.   Light colored fruits are NOT your friend - they tend to be higher in sugar and are not the best options for an ADA diet - examples are oranges, bananas, peaches, watermelon, -- you can eat these, but carefully and in moderation.     WATER. I cannot emphasize drinking water enough - it will hydrate you, make you full. Your body needs it - it's a natural appetite suppressant.   Sometimes hunger and thirst can feel the same. Try drinking some water first, then eat if you are still hungry.     Other snack choices    Celery with peanut butter   Celery with tuna salad   Dill pickles and cheddar cheese (no kidding, it's a great combo)   Nuts (keep raw ones in the freezer if you think you'll overeat them)   Sunflower seeds (get them in the shell so it will take longer to eat them)   Other seeds (How to Toast Pumpkin or Squash Seeds)    Pistachios or almonds - will fill you up and are tasty!    Low-Carb Trail Mix   Jerky (beef or turkey -- try to find low-sugar varieties)   Salami slices (you can find in the deli section)   Cheese sticks, such as string cheese   Sugar-free Jello, alone or with cottage cheese and a sprinkling of nuts. Make sugar-free lime Jello with part coconut milk -- For a large package, dissolve the powder in a cup of boiling water, add a can of coconut milk, and then add the rest of the water. Stir well.   Pepperoni "chips" -- Zap " the slices in the microwave   Cheese with a few apple slices   4-ounce plain or sugar-free yogurt with berries and flax seed meal   Smoked salmon and cream cheese on cucumber slices   Lettuce Roll-ups -- Roll luncheon meat, egg salad, tuna or other filling and veggies in lettuce leaves   Lunch Meat Roll-ups -- Roll cheese or veggies in lunch meat (read the labels for carbs on the lunch meat)   Spread bean dip, spinach dip, or other low-carb dip or spread on the lunch meat or lettuce and then roll it up   Raw veggies and spinach dip, or other low-carb dip   Pork rinds (Chicharrón), with or without dip   Ricotta cheese with fruit and/or nuts and/or flax seed meal   Mushrooms with cheese spread inside (or other spreads or dips)   Low-carb snack bars (watch out for sugar alcohols, especially maltitol)   Product Review: Atkins Advantage Bars   Pepperoni Chips -- Microwave pepperoni slices until crisp. Great with cheeses and dips   Garlic Parmesan Flax Seed Crackers   Parmesan Crisps -- Good when you want a crunchy snack.   Peanut Butter Protein Balls

## 2020-12-20 ENCOUNTER — PATIENT OUTREACH (OUTPATIENT)
Dept: ADMINISTRATIVE | Facility: OTHER | Age: 65
End: 2020-12-20

## 2020-12-20 NOTE — PROGRESS NOTES
Care Everywhere: updated  Immunization:   Health Maintenance: updated  Media Review: review for outside colon cancer report  Legacy Review:   Order placed:   Upcoming appts:  10.13.2020 arianne manzanares

## 2020-12-22 ENCOUNTER — PATIENT MESSAGE (OUTPATIENT)
Dept: SLEEP MEDICINE | Facility: CLINIC | Age: 65
End: 2020-12-22

## 2020-12-22 ENCOUNTER — TELEPHONE (OUTPATIENT)
Dept: SLEEP MEDICINE | Facility: CLINIC | Age: 65
End: 2020-12-22

## 2020-12-22 ENCOUNTER — OFFICE VISIT (OUTPATIENT)
Dept: CARDIOLOGY | Facility: CLINIC | Age: 65
End: 2020-12-22
Payer: MEDICARE

## 2020-12-22 VITALS
SYSTOLIC BLOOD PRESSURE: 141 MMHG | WEIGHT: 268.94 LBS | DIASTOLIC BLOOD PRESSURE: 63 MMHG | HEIGHT: 71 IN | HEART RATE: 64 BPM | BODY MASS INDEX: 37.65 KG/M2

## 2020-12-22 DIAGNOSIS — E66.01 SEVERE OBESITY (BMI 35.0-35.9 WITH COMORBIDITY): ICD-10-CM

## 2020-12-22 DIAGNOSIS — I25.10 CORONARY ARTERY DISEASE INVOLVING NATIVE CORONARY ARTERY OF NATIVE HEART WITHOUT ANGINA PECTORIS: Primary | ICD-10-CM

## 2020-12-22 DIAGNOSIS — G47.33 OSA ON CPAP: ICD-10-CM

## 2020-12-22 DIAGNOSIS — Z12.11 SPECIAL SCREENING FOR MALIGNANT NEOPLASMS, COLON: Primary | ICD-10-CM

## 2020-12-22 DIAGNOSIS — E11.59 HYPERTENSION ASSOCIATED WITH DIABETES: Chronic | ICD-10-CM

## 2020-12-22 DIAGNOSIS — N18.31 TYPE 2 DIABETES MELLITUS WITH STAGE 3A CHRONIC KIDNEY DISEASE, WITHOUT LONG-TERM CURRENT USE OF INSULIN: Chronic | ICD-10-CM

## 2020-12-22 DIAGNOSIS — E11.69 HYPERLIPIDEMIA ASSOCIATED WITH TYPE 2 DIABETES MELLITUS: Chronic | ICD-10-CM

## 2020-12-22 DIAGNOSIS — E11.22 TYPE 2 DIABETES MELLITUS WITH STAGE 3A CHRONIC KIDNEY DISEASE, WITHOUT LONG-TERM CURRENT USE OF INSULIN: Chronic | ICD-10-CM

## 2020-12-22 DIAGNOSIS — E78.5 HYPERLIPIDEMIA ASSOCIATED WITH TYPE 2 DIABETES MELLITUS: Chronic | ICD-10-CM

## 2020-12-22 DIAGNOSIS — I15.2 HYPERTENSION ASSOCIATED WITH DIABETES: Chronic | ICD-10-CM

## 2020-12-22 PROCEDURE — 99214 OFFICE O/P EST MOD 30 MIN: CPT | Mod: PBBFAC,PO | Performed by: NURSE PRACTITIONER

## 2020-12-22 PROCEDURE — 99214 OFFICE O/P EST MOD 30 MIN: CPT | Mod: S$PBB,,, | Performed by: NURSE PRACTITIONER

## 2020-12-22 PROCEDURE — 99214 PR OFFICE/OUTPT VISIT, EST, LEVL IV, 30-39 MIN: ICD-10-PCS | Mod: S$PBB,,, | Performed by: NURSE PRACTITIONER

## 2020-12-22 PROCEDURE — 99999 PR PBB SHADOW E&M-EST. PATIENT-LVL IV: ICD-10-PCS | Mod: PBBFAC,,, | Performed by: NURSE PRACTITIONER

## 2020-12-22 PROCEDURE — 99999 PR PBB SHADOW E&M-EST. PATIENT-LVL IV: CPT | Mod: PBBFAC,,, | Performed by: NURSE PRACTITIONER

## 2020-12-22 RX ORDER — NITROGLYCERIN 0.4 MG/1
0.4 TABLET SUBLINGUAL EVERY 5 MIN PRN
Qty: 60 TABLET | Refills: 12 | Status: SHIPPED | OUTPATIENT
Start: 2020-12-22 | End: 2023-11-21 | Stop reason: SDUPTHER

## 2020-12-22 NOTE — TELEPHONE ENCOUNTER
----- Message from Idania Johnson NP sent at 12/22/2020  9:33 AM CST -----  Regarding: DME  Good morning Dr. Keys,    Mr. Mahr is in need of a DME.  It looks like you had requested his records and had planned to give him a name of a DME place to get his supplies but he has not heard.  Could you please let him know where you are with that?    Thanks!  Idania

## 2020-12-22 NOTE — PROGRESS NOTES
Mr. Person is a patient of Dr. Nick and was last seen in [facility] 9/8/2020.      Subjective:   Patient ID:  Lukasz Person is a 65 y.o. male who presents for follow-up of Angiogram f/u    Problem List:  Non-obstructive CAD  Diabetes mellitus since his late 50s/early 60  Hypertension since his late 50s/early 60s  Mixed hyperlipidemia  KUMAR - 2006  Lithotripsy  Parathyroidectomy 2017  7 pack year h/o smoking, quit in 1972    HPI:   Lukasz Person (said Mar) is in clinic today for routine follow up.  Patient denies palpitations, SOB, CUELLAR, dizziness, syncope, edema, orthopnea, PND, or claudication.  Reports no exercise in the last 3 weeks d/t plantar facitis.  Patient has history of obstructive sleep apnea.  Reports nightly use of CPAP machine and denies any associated sx of KUMAR.  He was seen by Dr. Keys in September.      Review of Systems   Constitution: Negative for decreased appetite, diaphoresis, malaise/fatigue, weight gain and weight loss.   Eyes: Negative for visual disturbance.   Cardiovascular: Negative for chest pain, claudication, dyspnea on exertion, irregular heartbeat, leg swelling, near-syncope, orthopnea, palpitations, paroxysmal nocturnal dyspnea and syncope.        Denies chest pressure   Respiratory: Negative for cough, hemoptysis, shortness of breath, sleep disturbances due to breathing and snoring.    Endocrine: Negative for cold intolerance and heat intolerance.   Hematologic/Lymphatic: Negative for bleeding problem. Does not bruise/bleed easily.   Musculoskeletal: Negative for myalgias.   Gastrointestinal: Negative for bloating, abdominal pain, anorexia, change in bowel habit, constipation, diarrhea, nausea and vomiting.   Neurological: Negative for difficulty with concentration, disturbances in coordination, excessive daytime sleepiness, dizziness, headaches, light-headedness, loss of balance, numbness and weakness.   Psychiatric/Behavioral: The patient does not have insomnia.   "      Allergies and current medications updated and reviewed:  Review of patient's allergies indicates:  No Known Allergies  Current Outpatient Medications   Medication Sig    allopurinoL (ZYLOPRIM) 100 MG tablet TAKE 1 TABLET BY MOUTH ONCE DAILY    aspirin (ECOTRIN) 81 MG EC tablet Take 1 tablet (81 mg total) by mouth once daily.    blood sugar diagnostic Strp 1 strip by Misc.(Non-Drug; Combo Route) route every morning.    blood-glucose meter kit Checks blood sugars 1x/daily.    fenofibrate 160 MG Tab TAKE 1 TABLET(160 MG) BY MOUTH EVERY DAY    glimepiride (AMARYL) 2 MG tablet TAKE 2 TABLETS BY MOUTH EVERY MORNING    lancets (ACCU-CHEK SOFTCLIX LANCETS) Misc 1 each by Misc.(Non-Drug; Combo Route) route once daily.    lisinopriL-hydrochlorothiazide (PRINZIDE,ZESTORETIC) 20-25 mg Tab TAKE 1 TABLET BY MOUTH EVERY DAY    metFORMIN (GLUCOPHAGE) 500 MG tablet TAKE 2 TABLETS BY MOUTH EVERY MORNING AND 2 WITH DINNER    potassium citrate (UROCIT-K) 10 mEq (1,080 mg) TbSR TAKE 1 TABLET BY MOUTH TWICE DAILY    rosuvastatin (CRESTOR) 20 MG tablet Take 1 tablet (20 mg total) by mouth once daily.    testosterone enanthate (DELATESTRYL) 200 mg/mL injection Inject 1 mL (200 mg total) into the muscle every 28 days.     No current facility-administered medications for this visit.        Objective:        BP (!) 141/63   Pulse 64   Ht 5' 11" (1.803 m)   Wt 122 kg (268 lb 15.4 oz)   BMI 37.51 kg/m²     Physical Exam   Constitutional: He is oriented to person, place, and time. Vital signs are normal. He appears well-developed and well-nourished. He is active. No distress.   HENT:   Head: Normocephalic and atraumatic.   Eyes: Conjunctivae and lids are normal. No scleral icterus.   Neck: Neck supple. Normal carotid pulses, no hepatojugular reflux and no JVD present. Carotid bruit is not present.   Cardiovascular: Normal rate, regular rhythm, S1 normal, S2 normal and intact distal pulses. PMI is not displaced. Exam reveals " no gallop and no friction rub.   No murmur heard.  Pulses:       Carotid pulses are 2+ on the right side and 2+ on the left side.       Radial pulses are 2+ on the right side and 2+ on the left side.        Dorsalis pedis pulses are 2+ on the right side and 2+ on the left side.        Posterior tibial pulses are 1+ on the right side and 1+ on the left side.   Pulmonary/Chest: Effort normal and breath sounds normal. No respiratory distress. He has no decreased breath sounds. He has no wheezes. He has no rhonchi. He has no rales. He exhibits no tenderness.   Abdominal: Soft. Normal appearance and bowel sounds are normal. He exhibits no distension, no fluid wave, no abdominal bruit, no ascites and no pulsatile midline mass. There is no hepatosplenomegaly. There is no abdominal tenderness.   Musculoskeletal:         General: No edema.   Neurological: He is alert and oriented to person, place, and time. Gait normal.   Skin: Skin is warm, dry and intact. No rash noted. He is not diaphoretic. Nails show no clubbing.   Psychiatric: He has a normal mood and affect. His speech is normal and behavior is normal. Judgment and thought content normal. Cognition and memory are normal.   Nursing note and vitals reviewed.      Chemistry        Component Value Date/Time     12/05/2020 0904    K 4.9 12/05/2020 0904     12/05/2020 0904    CO2 29 12/05/2020 0904    BUN 19 12/05/2020 0904    CREATININE 1.3 12/05/2020 0904     (H) 12/05/2020 0904        Component Value Date/Time    CALCIUM 8.9 12/05/2020 0904    ALKPHOS 45 (L) 12/05/2020 0904    AST 32 12/05/2020 0904    ALT 29 12/05/2020 0904    BILITOT 0.3 12/05/2020 0904    ESTGFRAFRICA >60.0 12/05/2020 0904    EGFRNONAA 56.9 (A) 12/05/2020 0904        Lab Results   Component Value Date    HGBA1C 6.8 (H) 12/05/2020     Recent Labs   Lab 09/30/20  0915 10/17/20  0813 12/05/20  0904   WBC 5.01  --   --    Hemoglobin 12.9 L  --   --    Hematocrit 39.4 L  --   --    MCV  91  --   --    Platelets 169  --   --    TSH  --  2.521  --    Cholesterol  --  106 L 114 L   HDL  --  39 L 42   LDL Cholesterol  --  35.0 L 41.0 L   Triglycerides  --  160 H 155 H   HDL/Cholesterol Ratio  --  36.8 36.8     No results found for: IRON, TIBC, FERRITIN, SATURATEDIRO  No results found for: [id number]  No results found for: FOLATE           Test(s) Reviewed  I have reviewed the following in detail:  [] Stress test   [] Angiography   [x] Echocardiogram   [x] Labs   [] Other:         Assessment/Plan:   1. Coronary artery disease involving native coronary artery of native heart without angina pectoris  Non-obstructive disease on angiogram. Discussed use of NTG. Continue ASA and statin therapy.     2. Hypertension associated with diabetes  BP runs 120-140s. He does not have a BP cuff at home. Will refer to digital HTN.  Given copy of the DASH diet.     3. Hyperlipidemia associated with type 2 diabetes mellitus  LDL at goal <70. TG not at goal <150.  Discussed ways to lower TG levels.      4. KUMAR on CPAP  Using cpap. Encouraged nightly use of cpap and annual f/u with sleep clinic.      5. Severe obesity (BMI 35.0-35.9 with comorbidity)  BMI 37.5 Encouraged increased CV exercise to 30 minutes a day for 5 days a week.       6. Type 2 diabetes mellitus with stage 3a chronic kidney disease, without long-term current use of insulin  Lab Results   Component Value Date    HGBA1C 6.8 (H) 12/05/2020     A1C at goal <7. F/U with PCP as planned      A copy of this note will be Dr. Wilson and Dr. Nick    Follow up in about 6 months (around 6/22/2021).

## 2020-12-22 NOTE — PATIENT INSTRUCTIONS
Ways to lower triglycerides  Cut simple sugars out of your diet (white breads, candies, cookies, cakes, etc.)  Reduce or eliminate your intake of vegetable fats and highly processed trans fatty acids.   Exercise 30 minutes a day for 4-5 days a week.   Consider taking the Omega 3 supplement.  Eat more fiber.    Increase cardiovascular exercise to 30 minutes of brisk walking a day for 4-5 days a week.  You can use a stationary bike or swim for 30 minutes a day instead of walking.  Whatever exercise you choose, make sure you are working hard enough to increase your heart rate.     You can take a nitroglycerin for chest pain lasting 5 minutes.  If the pain goes away and returns or does not fully resolve, you can take a second one.  If you feel like you need a 3rd tablet, then call 911 before taking it.  The tablet should burn or tingle under your tongue if it is working.  The most common side effect is headache but it typically will lower your blood pressure as well.

## 2020-12-23 ENCOUNTER — PATIENT MESSAGE (OUTPATIENT)
Dept: SLEEP MEDICINE | Facility: CLINIC | Age: 65
End: 2020-12-23

## 2020-12-24 DIAGNOSIS — G47.33 OSA (OBSTRUCTIVE SLEEP APNEA): Primary | ICD-10-CM

## 2020-12-29 RX ORDER — LANCETS
1 EACH MISCELLANEOUS DAILY
Qty: 50 EACH | Refills: 3 | Status: SHIPPED | OUTPATIENT
Start: 2020-12-29 | End: 2020-12-30 | Stop reason: SDUPTHER

## 2020-12-31 ENCOUNTER — PATIENT MESSAGE (OUTPATIENT)
Dept: SLEEP MEDICINE | Facility: CLINIC | Age: 65
End: 2020-12-31

## 2020-12-31 RX ORDER — LANCETS
1 EACH MISCELLANEOUS DAILY
Qty: 50 EACH | Refills: 3 | Status: SHIPPED | OUTPATIENT
Start: 2020-12-31 | End: 2021-01-04 | Stop reason: SDUPTHER

## 2020-12-31 NOTE — PROGRESS NOTES
On 12/15/20, attempted to teach patient to self-inject testoterone. Has difficulty with drawing medication from vial, nurse assisted with this. Reluctant to administer, reassurance and teaching of injection sites, cleaning site, and adminstration of testoterone. With coaching, able to self-adminster to left thigh. Concerned pt may not be able to repeat this process at home due to difficulty with drawing up medication from vial and proper technique with self-adminstration. Scheduled follow up for next month's injection date to continue teaching. Hand outs given via [employer] tools for self-adminstration, testoterone and nursing technique for drawing up and adminstering IM injection from vial for patient to review.

## 2021-01-03 ENCOUNTER — PATIENT MESSAGE (OUTPATIENT)
Dept: SLEEP MEDICINE | Facility: CLINIC | Age: 66
End: 2021-01-03

## 2021-01-04 RX ORDER — INSULIN PUMP SYRINGE, 3 ML
EACH MISCELLANEOUS
Qty: 1 EACH | Refills: 12 | Status: SHIPPED | OUTPATIENT
Start: 2021-01-04

## 2021-01-04 RX ORDER — LANCETS
1 EACH MISCELLANEOUS DAILY
Qty: 50 EACH | Refills: 3 | Status: SHIPPED | OUTPATIENT
Start: 2021-01-04 | End: 2021-02-03

## 2021-01-07 ENCOUNTER — PATIENT MESSAGE (OUTPATIENT)
Dept: SLEEP MEDICINE | Facility: CLINIC | Age: 66
End: 2021-01-07

## 2021-01-12 ENCOUNTER — TELEPHONE (OUTPATIENT)
Dept: INTERNAL MEDICINE | Facility: CLINIC | Age: 66
End: 2021-01-12

## 2021-01-12 ENCOUNTER — CLINICAL SUPPORT (OUTPATIENT)
Dept: INTERNAL MEDICINE | Facility: CLINIC | Age: 66
End: 2021-01-12
Payer: MEDICARE

## 2021-01-14 ENCOUNTER — PATIENT MESSAGE (OUTPATIENT)
Dept: INTERNAL MEDICINE | Facility: CLINIC | Age: 66
End: 2021-01-14

## 2021-01-14 ENCOUNTER — PATIENT MESSAGE (OUTPATIENT)
Dept: SLEEP MEDICINE | Facility: CLINIC | Age: 66
End: 2021-01-14

## 2021-02-05 ENCOUNTER — PATIENT MESSAGE (OUTPATIENT)
Dept: INTERNAL MEDICINE | Facility: CLINIC | Age: 66
End: 2021-02-05

## 2021-02-12 ENCOUNTER — IMMUNIZATION (OUTPATIENT)
Dept: INTERNAL MEDICINE | Facility: CLINIC | Age: 66
End: 2021-02-12
Payer: MEDICARE

## 2021-02-12 DIAGNOSIS — Z23 NEED FOR VACCINATION: Primary | ICD-10-CM

## 2021-02-12 PROCEDURE — 0011A COVID-19, MRNA, LNP-S, PF, 100 MCG/0.5 ML DOSE VACCINE: CPT | Mod: PBBFAC | Performed by: FAMILY MEDICINE

## 2021-03-12 ENCOUNTER — IMMUNIZATION (OUTPATIENT)
Dept: INTERNAL MEDICINE | Facility: CLINIC | Age: 66
End: 2021-03-12
Payer: MEDICARE

## 2021-03-12 DIAGNOSIS — Z23 NEED FOR VACCINATION: Primary | ICD-10-CM

## 2021-03-12 PROCEDURE — 0012A COVID-19, MRNA, LNP-S, PF, 100 MCG/0.5 ML DOSE VACCINE: CPT | Mod: PBBFAC | Performed by: FAMILY MEDICINE

## 2021-03-13 ENCOUNTER — PATIENT MESSAGE (OUTPATIENT)
Dept: INTERNAL MEDICINE | Facility: CLINIC | Age: 66
End: 2021-03-13

## 2021-03-13 DIAGNOSIS — N18.31 TYPE 2 DIABETES MELLITUS WITH STAGE 3A CHRONIC KIDNEY DISEASE, WITHOUT LONG-TERM CURRENT USE OF INSULIN: Primary | ICD-10-CM

## 2021-03-13 DIAGNOSIS — E11.22 TYPE 2 DIABETES MELLITUS WITH STAGE 3A CHRONIC KIDNEY DISEASE, WITHOUT LONG-TERM CURRENT USE OF INSULIN: Primary | ICD-10-CM

## 2021-03-18 ENCOUNTER — PATIENT OUTREACH (OUTPATIENT)
Dept: ADMINISTRATIVE | Facility: HOSPITAL | Age: 66
End: 2021-03-18

## 2021-03-23 ENCOUNTER — PATIENT OUTREACH (OUTPATIENT)
Dept: ADMINISTRATIVE | Facility: OTHER | Age: 66
End: 2021-03-23

## 2021-03-23 DIAGNOSIS — E11.9 TYPE 2 DIABETES MELLITUS WITHOUT COMPLICATION, UNSPECIFIED WHETHER LONG TERM INSULIN USE: Primary | ICD-10-CM

## 2021-03-24 ENCOUNTER — OFFICE VISIT (OUTPATIENT)
Dept: PODIATRY | Facility: CLINIC | Age: 66
End: 2021-03-24
Payer: MEDICARE

## 2021-03-24 ENCOUNTER — HOSPITAL ENCOUNTER (OUTPATIENT)
Dept: RADIOLOGY | Facility: HOSPITAL | Age: 66
Discharge: HOME | End: 2021-03-24
Attending: PODIATRIST
Payer: MEDICARE

## 2021-03-24 VITALS
HEIGHT: 71 IN | BODY MASS INDEX: 37.51 KG/M2 | DIASTOLIC BLOOD PRESSURE: 65 MMHG | HEART RATE: 61 BPM | SYSTOLIC BLOOD PRESSURE: 137 MMHG

## 2021-03-24 DIAGNOSIS — E11.22 TYPE 2 DIABETES MELLITUS WITH STAGE 3A CHRONIC KIDNEY DISEASE, WITHOUT LONG-TERM CURRENT USE OF INSULIN: ICD-10-CM

## 2021-03-24 DIAGNOSIS — E11.42 DIABETIC POLYNEUROPATHY ASSOCIATED WITH TYPE 2 DIABETES MELLITUS: ICD-10-CM

## 2021-03-24 DIAGNOSIS — N18.31 TYPE 2 DIABETES MELLITUS WITH STAGE 3A CHRONIC KIDNEY DISEASE, WITHOUT LONG-TERM CURRENT USE OF INSULIN: ICD-10-CM

## 2021-03-24 DIAGNOSIS — M76.821 POSTERIOR TIBIAL TENDON DYSFUNCTION (PTTD) OF RIGHT LOWER EXTREMITY: ICD-10-CM

## 2021-03-24 DIAGNOSIS — M76.821 POSTERIOR TIBIAL TENDON DYSFUNCTION (PTTD) OF RIGHT LOWER EXTREMITY: Primary | ICD-10-CM

## 2021-03-24 DIAGNOSIS — M20.11 VALGUS DEFORMITY OF BOTH GREAT TOES: ICD-10-CM

## 2021-03-24 DIAGNOSIS — M79.671 PAIN OF RIGHT FOOT: ICD-10-CM

## 2021-03-24 DIAGNOSIS — M20.12 VALGUS DEFORMITY OF BOTH GREAT TOES: ICD-10-CM

## 2021-03-24 PROCEDURE — 73610 X-RAY EXAM OF ANKLE: CPT | Mod: 26,RT,, | Performed by: RADIOLOGY

## 2021-03-24 PROCEDURE — 73610 X-RAY EXAM OF ANKLE: CPT | Mod: TC,PO,RT

## 2021-03-24 PROCEDURE — 99203 OFFICE O/P NEW LOW 30 MIN: CPT | Mod: S$PBB,,, | Performed by: PODIATRIST

## 2021-03-24 PROCEDURE — 99999 PR PBB SHADOW E&M-EST. PATIENT-LVL IV: CPT | Mod: PBBFAC,,, | Performed by: PODIATRIST

## 2021-03-24 PROCEDURE — 99214 OFFICE O/P EST MOD 30 MIN: CPT | Mod: PBBFAC,PO | Performed by: PODIATRIST

## 2021-03-24 PROCEDURE — 73610 XR ANKLE COMPLETE 3 VIEW RIGHT: ICD-10-PCS | Mod: 26,RT,, | Performed by: RADIOLOGY

## 2021-03-24 PROCEDURE — 73630 X-RAY EXAM OF FOOT: CPT | Mod: TC,PO,RT

## 2021-03-24 PROCEDURE — 99999 PR PBB SHADOW E&M-EST. PATIENT-LVL IV: ICD-10-PCS | Mod: PBBFAC,,, | Performed by: PODIATRIST

## 2021-03-24 PROCEDURE — 73630 X-RAY EXAM OF FOOT: CPT | Mod: 26,RT,, | Performed by: RADIOLOGY

## 2021-03-24 PROCEDURE — 73630 XR FOOT COMPLETE 3 VIEW RIGHT: ICD-10-PCS | Mod: 26,RT,, | Performed by: RADIOLOGY

## 2021-03-24 PROCEDURE — 99203 PR OFFICE/OUTPT VISIT, NEW, LEVL III, 30-44 MIN: ICD-10-PCS | Mod: S$PBB,,, | Performed by: PODIATRIST

## 2021-03-25 ENCOUNTER — TELEPHONE (OUTPATIENT)
Dept: ENDOSCOPY | Facility: HOSPITAL | Age: 66
End: 2021-03-25

## 2021-04-05 ENCOUNTER — PATIENT MESSAGE (OUTPATIENT)
Dept: ADMINISTRATIVE | Facility: HOSPITAL | Age: 66
End: 2021-04-05

## 2021-04-13 ENCOUNTER — OFFICE VISIT (OUTPATIENT)
Dept: INTERNAL MEDICINE | Facility: CLINIC | Age: 66
End: 2021-04-13
Payer: MEDICARE

## 2021-04-13 VITALS
HEART RATE: 66 BPM | OXYGEN SATURATION: 97 % | SYSTOLIC BLOOD PRESSURE: 119 MMHG | HEIGHT: 72 IN | BODY MASS INDEX: 36.49 KG/M2 | WEIGHT: 269.38 LBS | DIASTOLIC BLOOD PRESSURE: 72 MMHG | TEMPERATURE: 98 F

## 2021-04-13 DIAGNOSIS — E11.59 HYPERTENSION ASSOCIATED WITH DIABETES: Chronic | ICD-10-CM

## 2021-04-13 DIAGNOSIS — I15.2 HYPERTENSION ASSOCIATED WITH DIABETES: Chronic | ICD-10-CM

## 2021-04-13 DIAGNOSIS — E78.5 HYPERLIPIDEMIA ASSOCIATED WITH TYPE 2 DIABETES MELLITUS: Chronic | ICD-10-CM

## 2021-04-13 DIAGNOSIS — E11.69 HYPERLIPIDEMIA ASSOCIATED WITH TYPE 2 DIABETES MELLITUS: Chronic | ICD-10-CM

## 2021-04-13 DIAGNOSIS — E29.1 MALE HYPOGONADISM: Chronic | ICD-10-CM

## 2021-04-13 DIAGNOSIS — G47.33 OSA ON CPAP: ICD-10-CM

## 2021-04-13 DIAGNOSIS — N18.31 TYPE 2 DIABETES MELLITUS WITH STAGE 3A CHRONIC KIDNEY DISEASE, WITHOUT LONG-TERM CURRENT USE OF INSULIN: Primary | Chronic | ICD-10-CM

## 2021-04-13 DIAGNOSIS — E11.22 TYPE 2 DIABETES MELLITUS WITH STAGE 3A CHRONIC KIDNEY DISEASE, WITHOUT LONG-TERM CURRENT USE OF INSULIN: Primary | Chronic | ICD-10-CM

## 2021-04-13 PROCEDURE — 99999 PR PBB SHADOW E&M-EST. PATIENT-LVL IV: ICD-10-PCS | Mod: PBBFAC,,, | Performed by: INTERNAL MEDICINE

## 2021-04-13 PROCEDURE — 99214 PR OFFICE/OUTPT VISIT, EST, LEVL IV, 30-39 MIN: ICD-10-PCS | Mod: S$PBB,,, | Performed by: INTERNAL MEDICINE

## 2021-04-13 PROCEDURE — 99214 OFFICE O/P EST MOD 30 MIN: CPT | Mod: PBBFAC,PO | Performed by: INTERNAL MEDICINE

## 2021-04-13 PROCEDURE — 99214 OFFICE O/P EST MOD 30 MIN: CPT | Mod: S$PBB,,, | Performed by: INTERNAL MEDICINE

## 2021-04-13 PROCEDURE — 99999 PR PBB SHADOW E&M-EST. PATIENT-LVL IV: CPT | Mod: PBBFAC,,, | Performed by: INTERNAL MEDICINE

## 2021-04-13 RX ORDER — BLOOD SUGAR DIAGNOSTIC
STRIP MISCELLANEOUS
COMMUNITY
Start: 2021-03-22 | End: 2021-06-11

## 2021-04-13 RX ORDER — TESTOSTERONE ENANTHATE 200 MG/ML
200 VIAL (ML) INTRAMUSCULAR
Qty: 1 ML | Refills: 0 | Status: SHIPPED | OUTPATIENT
Start: 2021-04-13 | End: 2021-04-19 | Stop reason: SDUPTHER

## 2021-04-20 RX ORDER — TESTOSTERONE ENANTHATE 200 MG/ML
200 VIAL (ML) INTRAMUSCULAR
Qty: 1 ML | Refills: 0 | Status: SHIPPED | OUTPATIENT
Start: 2021-04-20 | End: 2021-04-29

## 2021-04-21 ENCOUNTER — OFFICE VISIT (OUTPATIENT)
Dept: PODIATRY | Facility: CLINIC | Age: 66
End: 2021-04-21
Payer: MEDICARE

## 2021-04-21 VITALS
DIASTOLIC BLOOD PRESSURE: 65 MMHG | HEART RATE: 60 BPM | SYSTOLIC BLOOD PRESSURE: 134 MMHG | HEIGHT: 72 IN | BODY MASS INDEX: 36.54 KG/M2

## 2021-04-21 DIAGNOSIS — M79.671 PAIN OF RIGHT FOOT: ICD-10-CM

## 2021-04-21 DIAGNOSIS — M76.821 POSTERIOR TIBIAL TENDON DYSFUNCTION (PTTD) OF RIGHT LOWER EXTREMITY: ICD-10-CM

## 2021-04-21 DIAGNOSIS — E11.22 TYPE 2 DIABETES MELLITUS WITH STAGE 3A CHRONIC KIDNEY DISEASE, WITHOUT LONG-TERM CURRENT USE OF INSULIN: Primary | ICD-10-CM

## 2021-04-21 DIAGNOSIS — N18.31 TYPE 2 DIABETES MELLITUS WITH STAGE 3A CHRONIC KIDNEY DISEASE, WITHOUT LONG-TERM CURRENT USE OF INSULIN: Primary | ICD-10-CM

## 2021-04-21 PROCEDURE — 99212 OFFICE O/P EST SF 10 MIN: CPT | Mod: S$PBB,,, | Performed by: PODIATRIST

## 2021-04-21 PROCEDURE — 99212 PR OFFICE/OUTPT VISIT, EST, LEVL II, 10-19 MIN: ICD-10-PCS | Mod: S$PBB,,, | Performed by: PODIATRIST

## 2021-04-21 PROCEDURE — 99999 PR PBB SHADOW E&M-EST. PATIENT-LVL III: CPT | Mod: PBBFAC,,, | Performed by: PODIATRIST

## 2021-04-21 PROCEDURE — 99999 PR PBB SHADOW E&M-EST. PATIENT-LVL III: ICD-10-PCS | Mod: PBBFAC,,, | Performed by: PODIATRIST

## 2021-04-21 PROCEDURE — 99213 OFFICE O/P EST LOW 20 MIN: CPT | Mod: PBBFAC,PO | Performed by: PODIATRIST

## 2021-04-23 RX ORDER — TESTOSTERONE ENANTHATE 200 MG/ML
200 VIAL (ML) INTRAMUSCULAR
Qty: 1 ML | Refills: 0 | Status: CANCELLED | OUTPATIENT
Start: 2021-04-23 | End: 2021-10-22

## 2021-04-27 ENCOUNTER — HOSPITAL ENCOUNTER (OUTPATIENT)
Dept: RADIOLOGY | Facility: HOSPITAL | Age: 66
Discharge: HOME | End: 2021-04-27
Attending: PODIATRIST
Payer: MEDICARE

## 2021-04-27 DIAGNOSIS — M79.671 PAIN OF RIGHT FOOT: ICD-10-CM

## 2021-04-27 DIAGNOSIS — M76.821 POSTERIOR TIBIAL TENDON DYSFUNCTION (PTTD) OF RIGHT LOWER EXTREMITY: ICD-10-CM

## 2021-04-27 PROCEDURE — 73718 MRI LOWER EXTREMITY W/O DYE: CPT | Mod: TC,RT

## 2021-04-27 PROCEDURE — 73718 MRI FOOT (HINDFOOT) RIGHT WITHOUT CONTRAST: ICD-10-PCS | Mod: 26,RT,, | Performed by: RADIOLOGY

## 2021-04-27 PROCEDURE — 73718 MRI LOWER EXTREMITY W/O DYE: CPT | Mod: 26,RT,, | Performed by: RADIOLOGY

## 2021-04-28 ENCOUNTER — PATIENT MESSAGE (OUTPATIENT)
Dept: INTERNAL MEDICINE | Facility: CLINIC | Age: 66
End: 2021-04-28

## 2021-04-28 ENCOUNTER — TELEPHONE (OUTPATIENT)
Dept: ORTHOPEDICS | Facility: CLINIC | Age: 66
End: 2021-04-28

## 2021-04-28 DIAGNOSIS — S86.311A TEAR OF PERONEAL TENDON, RIGHT, INITIAL ENCOUNTER: Primary | ICD-10-CM

## 2021-04-28 DIAGNOSIS — M77.8 FLEXOR HALLUCIS LONGUS TENDINITIS: ICD-10-CM

## 2021-04-28 DIAGNOSIS — M72.2 PLANTAR FASCIITIS: ICD-10-CM

## 2021-04-29 ENCOUNTER — PATIENT MESSAGE (OUTPATIENT)
Dept: INTERNAL MEDICINE | Facility: CLINIC | Age: 66
End: 2021-04-29

## 2021-04-29 ENCOUNTER — LAB VISIT (OUTPATIENT)
Dept: LAB | Facility: HOSPITAL | Age: 66
End: 2021-04-29
Attending: INTERNAL MEDICINE
Payer: MEDICARE

## 2021-04-29 DIAGNOSIS — E11.22 TYPE 2 DIABETES MELLITUS WITH STAGE 3A CHRONIC KIDNEY DISEASE, WITHOUT LONG-TERM CURRENT USE OF INSULIN: Primary | ICD-10-CM

## 2021-04-29 DIAGNOSIS — E78.5 HYPERLIPIDEMIA ASSOCIATED WITH TYPE 2 DIABETES MELLITUS: Chronic | ICD-10-CM

## 2021-04-29 DIAGNOSIS — E29.1 MALE HYPOGONADISM: Chronic | ICD-10-CM

## 2021-04-29 DIAGNOSIS — N18.31 TYPE 2 DIABETES MELLITUS WITH STAGE 3A CHRONIC KIDNEY DISEASE, WITHOUT LONG-TERM CURRENT USE OF INSULIN: Primary | ICD-10-CM

## 2021-04-29 DIAGNOSIS — E11.59 HYPERTENSION ASSOCIATED WITH DIABETES: Chronic | ICD-10-CM

## 2021-04-29 DIAGNOSIS — G47.33 OSA ON CPAP: ICD-10-CM

## 2021-04-29 DIAGNOSIS — I15.2 HYPERTENSION ASSOCIATED WITH DIABETES: Chronic | ICD-10-CM

## 2021-04-29 DIAGNOSIS — N18.31 TYPE 2 DIABETES MELLITUS WITH STAGE 3A CHRONIC KIDNEY DISEASE, WITHOUT LONG-TERM CURRENT USE OF INSULIN: Chronic | ICD-10-CM

## 2021-04-29 DIAGNOSIS — E11.69 HYPERLIPIDEMIA ASSOCIATED WITH TYPE 2 DIABETES MELLITUS: Chronic | ICD-10-CM

## 2021-04-29 DIAGNOSIS — E11.22 TYPE 2 DIABETES MELLITUS WITH STAGE 3A CHRONIC KIDNEY DISEASE, WITHOUT LONG-TERM CURRENT USE OF INSULIN: Chronic | ICD-10-CM

## 2021-04-29 LAB
ALBUMIN SERPL BCP-MCNC: 4.1 G/DL (ref 3.5–5.2)
ALP SERPL-CCNC: 42 U/L (ref 55–135)
ALT SERPL W/O P-5'-P-CCNC: 24 U/L (ref 10–44)
ANION GAP SERPL CALC-SCNC: 9 MMOL/L (ref 8–16)
AST SERPL-CCNC: 26 U/L (ref 10–40)
BASOPHILS # BLD AUTO: 0.05 K/UL (ref 0–0.2)
BASOPHILS NFR BLD: 1 % (ref 0–1.9)
BILIRUB SERPL-MCNC: 0.4 MG/DL (ref 0.1–1)
BUN SERPL-MCNC: 25 MG/DL (ref 8–23)
CALCIUM SERPL-MCNC: 9.3 MG/DL (ref 8.7–10.5)
CHLORIDE SERPL-SCNC: 102 MMOL/L (ref 95–110)
CHOLEST SERPL-MCNC: 102 MG/DL (ref 120–199)
CHOLEST/HDLC SERPL: 3 {RATIO} (ref 2–5)
CO2 SERPL-SCNC: 29 MMOL/L (ref 23–29)
CREAT SERPL-MCNC: 1.2 MG/DL (ref 0.5–1.4)
DIFFERENTIAL METHOD: ABNORMAL
EOSINOPHIL # BLD AUTO: 0.2 K/UL (ref 0–0.5)
EOSINOPHIL NFR BLD: 3.8 % (ref 0–8)
ERYTHROCYTE [DISTWIDTH] IN BLOOD BY AUTOMATED COUNT: 14.2 % (ref 11.5–14.5)
EST. GFR  (AFRICAN AMERICAN): >60 ML/MIN/1.73 M^2
EST. GFR  (NON AFRICAN AMERICAN): >60 ML/MIN/1.73 M^2
ESTIMATED AVG GLUCOSE: 163 MG/DL (ref 68–131)
GLUCOSE SERPL-MCNC: 141 MG/DL (ref 70–110)
HBA1C MFR BLD: 7.3 % (ref 4–5.6)
HCT VFR BLD AUTO: 41.5 % (ref 40–54)
HDLC SERPL-MCNC: 34 MG/DL (ref 40–75)
HDLC SERPL: 33.3 % (ref 20–50)
HGB BLD-MCNC: 13.2 G/DL (ref 14–18)
IMM GRANULOCYTES # BLD AUTO: 0.02 K/UL (ref 0–0.04)
IMM GRANULOCYTES NFR BLD AUTO: 0.4 % (ref 0–0.5)
LDLC SERPL CALC-MCNC: 36.8 MG/DL (ref 63–159)
LYMPHOCYTES # BLD AUTO: 1.9 K/UL (ref 1–4.8)
LYMPHOCYTES NFR BLD: 36.1 % (ref 18–48)
MCH RBC QN AUTO: 29.8 PG (ref 27–31)
MCHC RBC AUTO-ENTMCNC: 31.8 G/DL (ref 32–36)
MCV RBC AUTO: 94 FL (ref 82–98)
MONOCYTES # BLD AUTO: 0.4 K/UL (ref 0.3–1)
MONOCYTES NFR BLD: 7.8 % (ref 4–15)
NEUTROPHILS # BLD AUTO: 2.7 K/UL (ref 1.8–7.7)
NEUTROPHILS NFR BLD: 50.9 % (ref 38–73)
NONHDLC SERPL-MCNC: 68 MG/DL
NRBC BLD-RTO: 0 /100 WBC
PLATELET # BLD AUTO: 177 K/UL (ref 150–450)
PMV BLD AUTO: 10.6 FL (ref 9.2–12.9)
POTASSIUM SERPL-SCNC: 4.6 MMOL/L (ref 3.5–5.1)
PROT SERPL-MCNC: 7.1 G/DL (ref 6–8.4)
RBC # BLD AUTO: 4.43 M/UL (ref 4.6–6.2)
SODIUM SERPL-SCNC: 140 MMOL/L (ref 136–145)
TESTOST SERPL-MCNC: 120 NG/DL (ref 304–1227)
TRIGL SERPL-MCNC: 156 MG/DL (ref 30–150)
WBC # BLD AUTO: 5.23 K/UL (ref 3.9–12.7)

## 2021-04-29 PROCEDURE — 80053 COMPREHEN METABOLIC PANEL: CPT | Performed by: INTERNAL MEDICINE

## 2021-04-29 PROCEDURE — 36415 COLL VENOUS BLD VENIPUNCTURE: CPT | Mod: PO | Performed by: INTERNAL MEDICINE

## 2021-04-29 PROCEDURE — 83036 HEMOGLOBIN GLYCOSYLATED A1C: CPT | Performed by: INTERNAL MEDICINE

## 2021-04-29 PROCEDURE — 84403 ASSAY OF TOTAL TESTOSTERONE: CPT | Performed by: INTERNAL MEDICINE

## 2021-04-29 PROCEDURE — 85025 COMPLETE CBC W/AUTO DIFF WBC: CPT | Performed by: INTERNAL MEDICINE

## 2021-04-29 PROCEDURE — 80061 LIPID PANEL: CPT | Performed by: INTERNAL MEDICINE

## 2021-04-29 RX ORDER — TESTOSTERONE CYPIONATE 200 MG/ML
200 INJECTION, SOLUTION INTRAMUSCULAR
Qty: 1 ML | Refills: 0 | Status: SHIPPED | OUTPATIENT
Start: 2021-04-29 | End: 2021-05-27 | Stop reason: SDUPTHER

## 2021-04-30 ENCOUNTER — PATIENT MESSAGE (OUTPATIENT)
Dept: INTERNAL MEDICINE | Facility: CLINIC | Age: 66
End: 2021-04-30

## 2021-04-30 ENCOUNTER — PATIENT OUTREACH (OUTPATIENT)
Dept: ADMINISTRATIVE | Facility: OTHER | Age: 66
End: 2021-04-30

## 2021-05-04 ENCOUNTER — PATIENT MESSAGE (OUTPATIENT)
Dept: INTERNAL MEDICINE | Facility: CLINIC | Age: 66
End: 2021-05-04

## 2021-05-04 ENCOUNTER — OFFICE VISIT (OUTPATIENT)
Dept: ORTHOPEDICS | Facility: CLINIC | Age: 66
End: 2021-05-04
Payer: MEDICARE

## 2021-05-04 VITALS — WEIGHT: 264.88 LBS | BODY MASS INDEX: 35.88 KG/M2 | HEIGHT: 72 IN

## 2021-05-04 DIAGNOSIS — M62.461 GASTROCNEMIUS EQUINUS, RIGHT: ICD-10-CM

## 2021-05-04 DIAGNOSIS — M72.2 PLANTAR FASCIITIS: ICD-10-CM

## 2021-05-04 DIAGNOSIS — S86.311A TEAR OF PERONEAL TENDON, RIGHT, INITIAL ENCOUNTER: ICD-10-CM

## 2021-05-04 DIAGNOSIS — M76.821 POSTERIOR TIBIAL TENDON DYSFUNCTION, RIGHT: Primary | ICD-10-CM

## 2021-05-04 PROCEDURE — 99204 PR OFFICE/OUTPT VISIT, NEW, LEVL IV, 45-59 MIN: ICD-10-PCS | Mod: S$PBB,,, | Performed by: ORTHOPAEDIC SURGERY

## 2021-05-04 PROCEDURE — 99999 PR PBB SHADOW E&M-EST. PATIENT-LVL IV: CPT | Mod: PBBFAC,,, | Performed by: ORTHOPAEDIC SURGERY

## 2021-05-04 PROCEDURE — 99214 OFFICE O/P EST MOD 30 MIN: CPT | Mod: PBBFAC,PO | Performed by: ORTHOPAEDIC SURGERY

## 2021-05-04 PROCEDURE — 99999 PR PBB SHADOW E&M-EST. PATIENT-LVL IV: ICD-10-PCS | Mod: PBBFAC,,, | Performed by: ORTHOPAEDIC SURGERY

## 2021-05-04 PROCEDURE — 99204 OFFICE O/P NEW MOD 45 MIN: CPT | Mod: S$PBB,,, | Performed by: ORTHOPAEDIC SURGERY

## 2021-05-05 ENCOUNTER — PATIENT MESSAGE (OUTPATIENT)
Dept: INTERNAL MEDICINE | Facility: CLINIC | Age: 66
End: 2021-05-05

## 2021-05-05 ENCOUNTER — TELEPHONE (OUTPATIENT)
Dept: INTERNAL MEDICINE | Facility: CLINIC | Age: 66
End: 2021-05-05

## 2021-05-27 ENCOUNTER — PATIENT MESSAGE (OUTPATIENT)
Dept: INTERNAL MEDICINE | Facility: CLINIC | Age: 66
End: 2021-05-27

## 2021-05-31 ENCOUNTER — LAB VISIT (OUTPATIENT)
Dept: LAB | Facility: HOSPITAL | Age: 66
End: 2021-05-31
Attending: NURSE PRACTITIONER
Payer: MEDICARE

## 2021-05-31 DIAGNOSIS — E66.9 OBESITY, DIABETES, AND HYPERTENSION SYNDROME: ICD-10-CM

## 2021-05-31 DIAGNOSIS — E11.69 OBESITY, DIABETES, AND HYPERTENSION SYNDROME: ICD-10-CM

## 2021-05-31 DIAGNOSIS — I25.10 CORONARY ARTERY DISEASE INVOLVING NATIVE CORONARY ARTERY OF NATIVE HEART WITHOUT ANGINA PECTORIS: ICD-10-CM

## 2021-05-31 DIAGNOSIS — E78.5 HYPERLIPIDEMIA ASSOCIATED WITH TYPE 2 DIABETES MELLITUS: Chronic | ICD-10-CM

## 2021-05-31 DIAGNOSIS — E11.69 HYPERLIPIDEMIA ASSOCIATED WITH TYPE 2 DIABETES MELLITUS: Chronic | ICD-10-CM

## 2021-05-31 DIAGNOSIS — E11.59 OBESITY, DIABETES, AND HYPERTENSION SYNDROME: ICD-10-CM

## 2021-05-31 DIAGNOSIS — N18.30 STAGE 3 CHRONIC KIDNEY DISEASE, UNSPECIFIED WHETHER STAGE 3A OR 3B CKD: Chronic | ICD-10-CM

## 2021-05-31 DIAGNOSIS — I15.2 OBESITY, DIABETES, AND HYPERTENSION SYNDROME: ICD-10-CM

## 2021-05-31 LAB
ALBUMIN SERPL BCP-MCNC: 4 G/DL (ref 3.5–5.2)
ALBUMIN SERPL BCP-MCNC: 4 G/DL (ref 3.5–5.2)
ALP SERPL-CCNC: 45 U/L (ref 55–135)
ALP SERPL-CCNC: 45 U/L (ref 55–135)
ALT SERPL W/O P-5'-P-CCNC: 20 U/L (ref 10–44)
ALT SERPL W/O P-5'-P-CCNC: 20 U/L (ref 10–44)
ANION GAP SERPL CALC-SCNC: 10 MMOL/L (ref 8–16)
ANION GAP SERPL CALC-SCNC: 10 MMOL/L (ref 8–16)
AST SERPL-CCNC: 25 U/L (ref 10–40)
AST SERPL-CCNC: 25 U/L (ref 10–40)
BILIRUB SERPL-MCNC: 0.3 MG/DL (ref 0.1–1)
BILIRUB SERPL-MCNC: 0.3 MG/DL (ref 0.1–1)
BUN SERPL-MCNC: 23 MG/DL (ref 8–23)
BUN SERPL-MCNC: 23 MG/DL (ref 8–23)
CALCIUM SERPL-MCNC: 9.5 MG/DL (ref 8.7–10.5)
CALCIUM SERPL-MCNC: 9.5 MG/DL (ref 8.7–10.5)
CHLORIDE SERPL-SCNC: 104 MMOL/L (ref 95–110)
CHLORIDE SERPL-SCNC: 104 MMOL/L (ref 95–110)
CHOLEST SERPL-MCNC: 108 MG/DL (ref 120–199)
CHOLEST SERPL-MCNC: 108 MG/DL (ref 120–199)
CHOLEST/HDLC SERPL: 2.8 {RATIO} (ref 2–5)
CHOLEST/HDLC SERPL: 2.8 {RATIO} (ref 2–5)
CO2 SERPL-SCNC: 24 MMOL/L (ref 23–29)
CO2 SERPL-SCNC: 24 MMOL/L (ref 23–29)
CREAT SERPL-MCNC: 1.4 MG/DL (ref 0.5–1.4)
CREAT SERPL-MCNC: 1.4 MG/DL (ref 0.5–1.4)
ERYTHROCYTE [DISTWIDTH] IN BLOOD BY AUTOMATED COUNT: 13.6 % (ref 11.5–14.5)
EST. GFR  (AFRICAN AMERICAN): 59.7 ML/MIN/1.73 M^2
EST. GFR  (AFRICAN AMERICAN): 59.7 ML/MIN/1.73 M^2
EST. GFR  (NON AFRICAN AMERICAN): 51.6 ML/MIN/1.73 M^2
EST. GFR  (NON AFRICAN AMERICAN): 51.6 ML/MIN/1.73 M^2
ESTIMATED AVG GLUCOSE: 151 MG/DL (ref 68–131)
GLUCOSE SERPL-MCNC: 131 MG/DL (ref 70–110)
GLUCOSE SERPL-MCNC: 131 MG/DL (ref 70–110)
HBA1C MFR BLD: 6.9 % (ref 4–5.6)
HCT VFR BLD AUTO: 41.2 % (ref 40–54)
HDLC SERPL-MCNC: 38 MG/DL (ref 40–75)
HDLC SERPL-MCNC: 38 MG/DL (ref 40–75)
HDLC SERPL: 35.2 % (ref 20–50)
HDLC SERPL: 35.2 % (ref 20–50)
HGB BLD-MCNC: 12.8 G/DL (ref 14–18)
LDLC SERPL CALC-MCNC: 32.4 MG/DL (ref 63–159)
LDLC SERPL CALC-MCNC: 32.4 MG/DL (ref 63–159)
MAGNESIUM SERPL-MCNC: 1.8 MG/DL (ref 1.6–2.6)
MCH RBC QN AUTO: 29.6 PG (ref 27–31)
MCHC RBC AUTO-ENTMCNC: 31.1 G/DL (ref 32–36)
MCV RBC AUTO: 95 FL (ref 82–98)
NONHDLC SERPL-MCNC: 70 MG/DL
NONHDLC SERPL-MCNC: 70 MG/DL
PLATELET # BLD AUTO: 171 K/UL (ref 150–450)
PMV BLD AUTO: 10.8 FL (ref 9.2–12.9)
POTASSIUM SERPL-SCNC: 4.4 MMOL/L (ref 3.5–5.1)
POTASSIUM SERPL-SCNC: 4.4 MMOL/L (ref 3.5–5.1)
PROT SERPL-MCNC: 7.2 G/DL (ref 6–8.4)
PROT SERPL-MCNC: 7.2 G/DL (ref 6–8.4)
RBC # BLD AUTO: 4.33 M/UL (ref 4.6–6.2)
SODIUM SERPL-SCNC: 138 MMOL/L (ref 136–145)
SODIUM SERPL-SCNC: 138 MMOL/L (ref 136–145)
TRIGL SERPL-MCNC: 188 MG/DL (ref 30–150)
TRIGL SERPL-MCNC: 188 MG/DL (ref 30–150)
WBC # BLD AUTO: 5.24 K/UL (ref 3.9–12.7)

## 2021-05-31 PROCEDURE — 83735 ASSAY OF MAGNESIUM: CPT | Performed by: NURSE PRACTITIONER

## 2021-05-31 PROCEDURE — 36415 COLL VENOUS BLD VENIPUNCTURE: CPT | Mod: PO | Performed by: NURSE PRACTITIONER

## 2021-05-31 PROCEDURE — 85027 COMPLETE CBC AUTOMATED: CPT | Performed by: NURSE PRACTITIONER

## 2021-05-31 PROCEDURE — 83036 HEMOGLOBIN GLYCOSYLATED A1C: CPT | Performed by: NURSE PRACTITIONER

## 2021-05-31 PROCEDURE — 80053 COMPREHEN METABOLIC PANEL: CPT | Performed by: NURSE PRACTITIONER

## 2021-05-31 PROCEDURE — 80061 LIPID PANEL: CPT | Performed by: NURSE PRACTITIONER

## 2021-06-02 ENCOUNTER — PATIENT MESSAGE (OUTPATIENT)
Dept: CARDIOLOGY | Facility: CLINIC | Age: 66
End: 2021-06-02

## 2021-06-11 RX ORDER — POTASSIUM CITRATE 10 MEQ/1
TABLET, EXTENDED RELEASE ORAL
Qty: 60 TABLET | Refills: 9 | Status: SHIPPED | OUTPATIENT
Start: 2021-06-11 | End: 2021-06-15

## 2021-06-15 ENCOUNTER — OFFICE VISIT (OUTPATIENT)
Dept: INTERNAL MEDICINE | Facility: CLINIC | Age: 66
End: 2021-06-15
Payer: MEDICARE

## 2021-06-15 VITALS
HEIGHT: 72 IN | TEMPERATURE: 98 F | HEART RATE: 82 BPM | WEIGHT: 265.44 LBS | OXYGEN SATURATION: 99 % | RESPIRATION RATE: 16 BRPM | SYSTOLIC BLOOD PRESSURE: 130 MMHG | DIASTOLIC BLOOD PRESSURE: 70 MMHG | BODY MASS INDEX: 35.95 KG/M2

## 2021-06-15 DIAGNOSIS — E78.5 HYPERLIPIDEMIA ASSOCIATED WITH TYPE 2 DIABETES MELLITUS: Chronic | ICD-10-CM

## 2021-06-15 DIAGNOSIS — E11.59 HYPERTENSION ASSOCIATED WITH DIABETES: Chronic | ICD-10-CM

## 2021-06-15 DIAGNOSIS — E11.69 HYPERLIPIDEMIA ASSOCIATED WITH TYPE 2 DIABETES MELLITUS: Chronic | ICD-10-CM

## 2021-06-15 DIAGNOSIS — N18.30 STAGE 3 CHRONIC KIDNEY DISEASE, UNSPECIFIED WHETHER STAGE 3A OR 3B CKD: Primary | Chronic | ICD-10-CM

## 2021-06-15 DIAGNOSIS — I15.2 HYPERTENSION ASSOCIATED WITH DIABETES: Chronic | ICD-10-CM

## 2021-06-15 DIAGNOSIS — E66.01 SEVERE OBESITY (BMI 35.0-35.9 WITH COMORBIDITY): ICD-10-CM

## 2021-06-15 DIAGNOSIS — N18.31 TYPE 2 DIABETES MELLITUS WITH STAGE 3A CHRONIC KIDNEY DISEASE, WITHOUT LONG-TERM CURRENT USE OF INSULIN: Chronic | ICD-10-CM

## 2021-06-15 DIAGNOSIS — E11.22 TYPE 2 DIABETES MELLITUS WITH STAGE 3A CHRONIC KIDNEY DISEASE, WITHOUT LONG-TERM CURRENT USE OF INSULIN: Chronic | ICD-10-CM

## 2021-06-15 PROCEDURE — 99214 OFFICE O/P EST MOD 30 MIN: CPT | Mod: S$PBB,,, | Performed by: NURSE PRACTITIONER

## 2021-06-15 PROCEDURE — 99999 PR PBB SHADOW E&M-EST. PATIENT-LVL V: CPT | Mod: PBBFAC,,, | Performed by: NURSE PRACTITIONER

## 2021-06-15 PROCEDURE — 99999 PR PBB SHADOW E&M-EST. PATIENT-LVL V: ICD-10-PCS | Mod: PBBFAC,,, | Performed by: NURSE PRACTITIONER

## 2021-06-15 PROCEDURE — 99214 PR OFFICE/OUTPT VISIT, EST, LEVL IV, 30-39 MIN: ICD-10-PCS | Mod: S$PBB,,, | Performed by: NURSE PRACTITIONER

## 2021-06-15 PROCEDURE — 99215 OFFICE O/P EST HI 40 MIN: CPT | Mod: PBBFAC,PO | Performed by: NURSE PRACTITIONER

## 2021-06-16 ENCOUNTER — PATIENT MESSAGE (OUTPATIENT)
Dept: INTERNAL MEDICINE | Facility: CLINIC | Age: 66
End: 2021-06-16

## 2021-06-16 DIAGNOSIS — E11.69 HYPERLIPIDEMIA ASSOCIATED WITH TYPE 2 DIABETES MELLITUS: Chronic | ICD-10-CM

## 2021-06-16 DIAGNOSIS — I15.2 HYPERTENSION ASSOCIATED WITH DIABETES: Chronic | ICD-10-CM

## 2021-06-16 DIAGNOSIS — E78.5 HYPERLIPIDEMIA ASSOCIATED WITH TYPE 2 DIABETES MELLITUS: Chronic | ICD-10-CM

## 2021-06-16 DIAGNOSIS — E11.59 HYPERTENSION ASSOCIATED WITH DIABETES: Chronic | ICD-10-CM

## 2021-06-16 DIAGNOSIS — E11.65 UNCONTROLLED TYPE 2 DIABETES MELLITUS WITH HYPERGLYCEMIA: Primary | Chronic | ICD-10-CM

## 2021-06-17 ENCOUNTER — PATIENT MESSAGE (OUTPATIENT)
Dept: INTERNAL MEDICINE | Facility: CLINIC | Age: 66
End: 2021-06-17

## 2021-06-21 ENCOUNTER — PATIENT OUTREACH (OUTPATIENT)
Dept: ADMINISTRATIVE | Facility: OTHER | Age: 66
End: 2021-06-21

## 2021-06-22 ENCOUNTER — OFFICE VISIT (OUTPATIENT)
Dept: CARDIOLOGY | Facility: CLINIC | Age: 66
End: 2021-06-22
Payer: MEDICARE

## 2021-06-22 VITALS
HEIGHT: 72 IN | HEART RATE: 64 BPM | DIASTOLIC BLOOD PRESSURE: 78 MMHG | BODY MASS INDEX: 36.04 KG/M2 | SYSTOLIC BLOOD PRESSURE: 144 MMHG | WEIGHT: 266.13 LBS

## 2021-06-22 DIAGNOSIS — N18.31 TYPE 2 DIABETES MELLITUS WITH STAGE 3A CHRONIC KIDNEY DISEASE, WITHOUT LONG-TERM CURRENT USE OF INSULIN: Chronic | ICD-10-CM

## 2021-06-22 DIAGNOSIS — E11.59 HYPERTENSION ASSOCIATED WITH DIABETES: Chronic | ICD-10-CM

## 2021-06-22 DIAGNOSIS — E11.69 HYPERLIPIDEMIA ASSOCIATED WITH TYPE 2 DIABETES MELLITUS: Chronic | ICD-10-CM

## 2021-06-22 DIAGNOSIS — I25.10 CORONARY ARTERY DISEASE INVOLVING NATIVE CORONARY ARTERY OF NATIVE HEART WITHOUT ANGINA PECTORIS: Primary | ICD-10-CM

## 2021-06-22 DIAGNOSIS — N18.31 STAGE 3A CHRONIC KIDNEY DISEASE: ICD-10-CM

## 2021-06-22 DIAGNOSIS — G47.33 OSA ON CPAP: Chronic | ICD-10-CM

## 2021-06-22 DIAGNOSIS — E11.22 TYPE 2 DIABETES MELLITUS WITH STAGE 3A CHRONIC KIDNEY DISEASE, WITHOUT LONG-TERM CURRENT USE OF INSULIN: Chronic | ICD-10-CM

## 2021-06-22 DIAGNOSIS — E78.5 HYPERLIPIDEMIA ASSOCIATED WITH TYPE 2 DIABETES MELLITUS: Chronic | ICD-10-CM

## 2021-06-22 DIAGNOSIS — I15.2 HYPERTENSION ASSOCIATED WITH DIABETES: Chronic | ICD-10-CM

## 2021-06-22 DIAGNOSIS — E66.01 SEVERE OBESITY (BMI 35.0-35.9 WITH COMORBIDITY): ICD-10-CM

## 2021-06-22 DIAGNOSIS — R74.8 LOW SERUM ALKALINE PHOSPHATASE: ICD-10-CM

## 2021-06-22 PROCEDURE — 99214 OFFICE O/P EST MOD 30 MIN: CPT | Mod: PBBFAC,PO | Performed by: NURSE PRACTITIONER

## 2021-06-22 PROCEDURE — 99214 OFFICE O/P EST MOD 30 MIN: CPT | Mod: S$PBB,,, | Performed by: NURSE PRACTITIONER

## 2021-06-22 PROCEDURE — 99214 PR OFFICE/OUTPT VISIT, EST, LEVL IV, 30-39 MIN: ICD-10-PCS | Mod: S$PBB,,, | Performed by: NURSE PRACTITIONER

## 2021-06-22 PROCEDURE — 99999 PR PBB SHADOW E&M-EST. PATIENT-LVL IV: CPT | Mod: PBBFAC,,, | Performed by: NURSE PRACTITIONER

## 2021-06-22 PROCEDURE — 99999 PR PBB SHADOW E&M-EST. PATIENT-LVL IV: ICD-10-PCS | Mod: PBBFAC,,, | Performed by: NURSE PRACTITIONER

## 2021-06-28 RX ORDER — TESTOSTERONE CYPIONATE 200 MG/ML
200 INJECTION, SOLUTION INTRAMUSCULAR
Qty: 1 ML | Refills: 0 | Status: SHIPPED | OUTPATIENT
Start: 2021-06-28 | End: 2021-07-26

## 2021-07-06 ENCOUNTER — OFFICE VISIT (OUTPATIENT)
Dept: ORTHOPEDICS | Facility: CLINIC | Age: 66
End: 2021-07-06
Payer: MEDICARE

## 2021-07-06 ENCOUNTER — PATIENT MESSAGE (OUTPATIENT)
Dept: ADMINISTRATIVE | Facility: HOSPITAL | Age: 66
End: 2021-07-06

## 2021-07-06 VITALS — WEIGHT: 266 LBS | BODY MASS INDEX: 36.03 KG/M2 | HEIGHT: 72 IN

## 2021-07-06 DIAGNOSIS — M76.821 POSTERIOR TIBIAL TENDON DYSFUNCTION, RIGHT: Primary | ICD-10-CM

## 2021-07-06 DIAGNOSIS — S86.311A TEAR OF PERONEAL TENDON, RIGHT, INITIAL ENCOUNTER: ICD-10-CM

## 2021-07-06 DIAGNOSIS — M62.461 GASTROCNEMIUS EQUINUS, RIGHT: ICD-10-CM

## 2021-07-06 PROCEDURE — 99213 OFFICE O/P EST LOW 20 MIN: CPT | Mod: S$PBB,,, | Performed by: ORTHOPAEDIC SURGERY

## 2021-07-06 PROCEDURE — 99999 PR PBB SHADOW E&M-EST. PATIENT-LVL III: CPT | Mod: PBBFAC,,, | Performed by: ORTHOPAEDIC SURGERY

## 2021-07-06 PROCEDURE — 99213 OFFICE O/P EST LOW 20 MIN: CPT | Mod: PBBFAC,PO | Performed by: ORTHOPAEDIC SURGERY

## 2021-07-06 PROCEDURE — 99999 PR PBB SHADOW E&M-EST. PATIENT-LVL III: ICD-10-PCS | Mod: PBBFAC,,, | Performed by: ORTHOPAEDIC SURGERY

## 2021-07-06 PROCEDURE — 99213 PR OFFICE/OUTPT VISIT, EST, LEVL III, 20-29 MIN: ICD-10-PCS | Mod: S$PBB,,, | Performed by: ORTHOPAEDIC SURGERY

## 2021-07-26 RX ORDER — TESTOSTERONE CYPIONATE 200 MG/ML
INJECTION, SOLUTION INTRAMUSCULAR
Qty: 1 ML | Refills: 0 | Status: SHIPPED | OUTPATIENT
Start: 2021-07-26 | End: 2021-08-24

## 2021-08-17 ENCOUNTER — PATIENT MESSAGE (OUTPATIENT)
Dept: INTERNAL MEDICINE | Facility: CLINIC | Age: 66
End: 2021-08-17

## 2021-08-24 RX ORDER — TESTOSTERONE CYPIONATE 200 MG/ML
INJECTION, SOLUTION INTRAMUSCULAR
Qty: 1 ML | Refills: 0 | Status: SHIPPED | OUTPATIENT
Start: 2021-08-24 | End: 2021-09-21

## 2021-09-04 RX ORDER — ALLOPURINOL 100 MG/1
TABLET ORAL
Qty: 30 TABLET | Refills: 10 | Status: SHIPPED | OUTPATIENT
Start: 2021-09-04 | End: 2021-11-08

## 2021-09-21 RX ORDER — TESTOSTERONE CYPIONATE 200 MG/ML
INJECTION, SOLUTION INTRAMUSCULAR
Qty: 1 ML | Refills: 0 | Status: SHIPPED | OUTPATIENT
Start: 2021-09-21 | End: 2021-10-12 | Stop reason: SDUPTHER

## 2021-10-04 ENCOUNTER — PATIENT MESSAGE (OUTPATIENT)
Dept: ADMINISTRATIVE | Facility: HOSPITAL | Age: 66
End: 2021-10-04

## 2021-10-05 ENCOUNTER — PATIENT MESSAGE (OUTPATIENT)
Dept: INTERNAL MEDICINE | Facility: CLINIC | Age: 66
End: 2021-10-05

## 2021-10-05 DIAGNOSIS — Z12.5 PROSTATE CANCER SCREENING: ICD-10-CM

## 2021-10-05 DIAGNOSIS — E11.59 HYPERTENSION ASSOCIATED WITH DIABETES: Primary | ICD-10-CM

## 2021-10-05 DIAGNOSIS — N18.31 TYPE 2 DIABETES MELLITUS WITH STAGE 3A CHRONIC KIDNEY DISEASE, WITHOUT LONG-TERM CURRENT USE OF INSULIN: ICD-10-CM

## 2021-10-05 DIAGNOSIS — E78.5 HYPERLIPIDEMIA ASSOCIATED WITH TYPE 2 DIABETES MELLITUS: ICD-10-CM

## 2021-10-05 DIAGNOSIS — E11.22 TYPE 2 DIABETES MELLITUS WITH STAGE 3A CHRONIC KIDNEY DISEASE, WITHOUT LONG-TERM CURRENT USE OF INSULIN: ICD-10-CM

## 2021-10-05 DIAGNOSIS — E29.1 HYPOGONADISM IN MALE: ICD-10-CM

## 2021-10-05 DIAGNOSIS — E11.69 HYPERLIPIDEMIA ASSOCIATED WITH TYPE 2 DIABETES MELLITUS: ICD-10-CM

## 2021-10-05 DIAGNOSIS — E11.65 UNCONTROLLED TYPE 2 DIABETES MELLITUS WITH HYPERGLYCEMIA: ICD-10-CM

## 2021-10-05 DIAGNOSIS — I15.2 HYPERTENSION ASSOCIATED WITH DIABETES: Primary | ICD-10-CM

## 2021-10-06 ENCOUNTER — LAB VISIT (OUTPATIENT)
Dept: LAB | Facility: HOSPITAL | Age: 66
End: 2021-10-06
Attending: INTERNAL MEDICINE
Payer: MEDICARE

## 2021-10-06 DIAGNOSIS — E11.59 HYPERTENSION ASSOCIATED WITH DIABETES: ICD-10-CM

## 2021-10-06 DIAGNOSIS — E11.65 UNCONTROLLED TYPE 2 DIABETES MELLITUS WITH HYPERGLYCEMIA: ICD-10-CM

## 2021-10-06 DIAGNOSIS — Z12.5 PROSTATE CANCER SCREENING: ICD-10-CM

## 2021-10-06 DIAGNOSIS — E29.1 HYPOGONADISM IN MALE: ICD-10-CM

## 2021-10-06 DIAGNOSIS — I15.2 HYPERTENSION ASSOCIATED WITH DIABETES: ICD-10-CM

## 2021-10-06 DIAGNOSIS — E78.5 HYPERLIPIDEMIA ASSOCIATED WITH TYPE 2 DIABETES MELLITUS: ICD-10-CM

## 2021-10-06 DIAGNOSIS — E11.69 HYPERLIPIDEMIA ASSOCIATED WITH TYPE 2 DIABETES MELLITUS: ICD-10-CM

## 2021-10-06 LAB
ALBUMIN SERPL BCP-MCNC: 4.2 G/DL (ref 3.5–5.2)
ALP SERPL-CCNC: 45 U/L (ref 55–135)
ALT SERPL W/O P-5'-P-CCNC: 25 U/L (ref 10–44)
ANION GAP SERPL CALC-SCNC: 15 MMOL/L (ref 8–16)
AST SERPL-CCNC: 27 U/L (ref 10–40)
BASOPHILS # BLD AUTO: 0.04 K/UL (ref 0–0.2)
BASOPHILS NFR BLD: 0.7 % (ref 0–1.9)
BILIRUB SERPL-MCNC: 0.6 MG/DL (ref 0.1–1)
BUN SERPL-MCNC: 23 MG/DL (ref 8–23)
CALCIUM SERPL-MCNC: 9.9 MG/DL (ref 8.7–10.5)
CHLORIDE SERPL-SCNC: 101 MMOL/L (ref 95–110)
CHOLEST SERPL-MCNC: 101 MG/DL (ref 120–199)
CHOLEST/HDLC SERPL: 2.8 {RATIO} (ref 2–5)
CO2 SERPL-SCNC: 23 MMOL/L (ref 23–29)
COMPLEXED PSA SERPL-MCNC: 1.2 NG/ML (ref 0–4)
CREAT SERPL-MCNC: 1.3 MG/DL (ref 0.5–1.4)
DIFFERENTIAL METHOD: ABNORMAL
EOSINOPHIL # BLD AUTO: 0.2 K/UL (ref 0–0.5)
EOSINOPHIL NFR BLD: 4.1 % (ref 0–8)
ERYTHROCYTE [DISTWIDTH] IN BLOOD BY AUTOMATED COUNT: 14.5 % (ref 11.5–14.5)
EST. GFR  (AFRICAN AMERICAN): >60 ML/MIN/1.73 M^2
EST. GFR  (NON AFRICAN AMERICAN): 56.5 ML/MIN/1.73 M^2
ESTIMATED AVG GLUCOSE: 154 MG/DL (ref 68–131)
GLUCOSE SERPL-MCNC: 137 MG/DL (ref 70–110)
HBA1C MFR BLD: 7 % (ref 4–5.6)
HCT VFR BLD AUTO: 44.2 % (ref 40–54)
HDLC SERPL-MCNC: 36 MG/DL (ref 40–75)
HDLC SERPL: 35.6 % (ref 20–50)
HGB BLD-MCNC: 14.1 G/DL (ref 14–18)
IMM GRANULOCYTES # BLD AUTO: 0.03 K/UL (ref 0–0.04)
IMM GRANULOCYTES NFR BLD AUTO: 0.5 % (ref 0–0.5)
LDLC SERPL CALC-MCNC: 34.2 MG/DL (ref 63–159)
LYMPHOCYTES # BLD AUTO: 2.1 K/UL (ref 1–4.8)
LYMPHOCYTES NFR BLD: 36 % (ref 18–48)
MCH RBC QN AUTO: 29.6 PG (ref 27–31)
MCHC RBC AUTO-ENTMCNC: 31.9 G/DL (ref 32–36)
MCV RBC AUTO: 93 FL (ref 82–98)
MONOCYTES # BLD AUTO: 0.5 K/UL (ref 0.3–1)
MONOCYTES NFR BLD: 9.1 % (ref 4–15)
NEUTROPHILS # BLD AUTO: 2.9 K/UL (ref 1.8–7.7)
NEUTROPHILS NFR BLD: 49.6 % (ref 38–73)
NONHDLC SERPL-MCNC: 65 MG/DL
NRBC BLD-RTO: 0 /100 WBC
PLATELET # BLD AUTO: 183 K/UL (ref 150–450)
PMV BLD AUTO: 10.8 FL (ref 9.2–12.9)
POTASSIUM SERPL-SCNC: 4.7 MMOL/L (ref 3.5–5.1)
PROT SERPL-MCNC: 7.3 G/DL (ref 6–8.4)
RBC # BLD AUTO: 4.76 M/UL (ref 4.6–6.2)
SODIUM SERPL-SCNC: 139 MMOL/L (ref 136–145)
TESTOST SERPL-MCNC: 187 NG/DL (ref 304–1227)
TRIGL SERPL-MCNC: 154 MG/DL (ref 30–150)
TSH SERPL DL<=0.005 MIU/L-ACNC: 1.57 UIU/ML (ref 0.4–4)
WBC # BLD AUTO: 5.81 K/UL (ref 3.9–12.7)

## 2021-10-06 PROCEDURE — 84153 ASSAY OF PSA TOTAL: CPT | Mod: GA | Performed by: INTERNAL MEDICINE

## 2021-10-06 PROCEDURE — 85025 COMPLETE CBC W/AUTO DIFF WBC: CPT | Performed by: INTERNAL MEDICINE

## 2021-10-06 PROCEDURE — 80053 COMPREHEN METABOLIC PANEL: CPT | Performed by: INTERNAL MEDICINE

## 2021-10-06 PROCEDURE — 80061 LIPID PANEL: CPT | Performed by: INTERNAL MEDICINE

## 2021-10-06 PROCEDURE — 84403 ASSAY OF TOTAL TESTOSTERONE: CPT | Performed by: INTERNAL MEDICINE

## 2021-10-06 PROCEDURE — 36415 COLL VENOUS BLD VENIPUNCTURE: CPT | Mod: PO | Performed by: INTERNAL MEDICINE

## 2021-10-06 PROCEDURE — 84443 ASSAY THYROID STIM HORMONE: CPT | Performed by: INTERNAL MEDICINE

## 2021-10-06 PROCEDURE — 83036 HEMOGLOBIN GLYCOSYLATED A1C: CPT | Performed by: INTERNAL MEDICINE

## 2021-10-07 ENCOUNTER — TELEPHONE (OUTPATIENT)
Dept: INTERNAL MEDICINE | Facility: CLINIC | Age: 66
End: 2021-10-07

## 2021-10-07 ENCOUNTER — PATIENT MESSAGE (OUTPATIENT)
Dept: INTERNAL MEDICINE | Facility: CLINIC | Age: 66
End: 2021-10-07

## 2021-10-07 DIAGNOSIS — R97.20 INCREASED PROSTATE SPECIFIC ANTIGEN (PSA) VELOCITY: Primary | ICD-10-CM

## 2021-10-12 ENCOUNTER — HOSPITAL ENCOUNTER (OUTPATIENT)
Dept: RADIOLOGY | Facility: HOSPITAL | Age: 66
Discharge: HOME | End: 2021-10-12
Attending: INTERNAL MEDICINE
Payer: MEDICARE

## 2021-10-12 ENCOUNTER — OFFICE VISIT (OUTPATIENT)
Dept: INTERNAL MEDICINE | Facility: CLINIC | Age: 66
End: 2021-10-12
Payer: MEDICARE

## 2021-10-12 VITALS
HEIGHT: 72 IN | DIASTOLIC BLOOD PRESSURE: 70 MMHG | BODY MASS INDEX: 36.25 KG/M2 | SYSTOLIC BLOOD PRESSURE: 140 MMHG | RESPIRATION RATE: 18 BRPM | WEIGHT: 267.63 LBS | HEART RATE: 60 BPM | TEMPERATURE: 98 F | OXYGEN SATURATION: 97 %

## 2021-10-12 DIAGNOSIS — M25.512 CHRONIC LEFT SHOULDER PAIN: ICD-10-CM

## 2021-10-12 DIAGNOSIS — G89.29 CHRONIC LEFT SHOULDER PAIN: ICD-10-CM

## 2021-10-12 DIAGNOSIS — E11.69 HYPERLIPIDEMIA ASSOCIATED WITH TYPE 2 DIABETES MELLITUS: Chronic | ICD-10-CM

## 2021-10-12 DIAGNOSIS — G47.33 OSA ON CPAP: Chronic | ICD-10-CM

## 2021-10-12 DIAGNOSIS — E11.65 UNCONTROLLED TYPE 2 DIABETES MELLITUS WITH HYPERGLYCEMIA: Chronic | ICD-10-CM

## 2021-10-12 DIAGNOSIS — N18.31 TYPE 2 DIABETES MELLITUS WITH STAGE 3A CHRONIC KIDNEY DISEASE, WITHOUT LONG-TERM CURRENT USE OF INSULIN: Primary | Chronic | ICD-10-CM

## 2021-10-12 DIAGNOSIS — I15.2 HYPERTENSION ASSOCIATED WITH DIABETES: Chronic | ICD-10-CM

## 2021-10-12 DIAGNOSIS — E78.5 HYPERLIPIDEMIA ASSOCIATED WITH TYPE 2 DIABETES MELLITUS: Chronic | ICD-10-CM

## 2021-10-12 DIAGNOSIS — E11.59 HYPERTENSION ASSOCIATED WITH DIABETES: Chronic | ICD-10-CM

## 2021-10-12 DIAGNOSIS — E29.1 MALE HYPOGONADISM: Chronic | ICD-10-CM

## 2021-10-12 DIAGNOSIS — E11.22 TYPE 2 DIABETES MELLITUS WITH STAGE 3A CHRONIC KIDNEY DISEASE, WITHOUT LONG-TERM CURRENT USE OF INSULIN: Primary | Chronic | ICD-10-CM

## 2021-10-12 DIAGNOSIS — L98.9 SKIN LESION: ICD-10-CM

## 2021-10-12 DIAGNOSIS — R59.0 CERVICAL LYMPHADENOPATHY: ICD-10-CM

## 2021-10-12 PROCEDURE — 73030 X-RAY EXAM OF SHOULDER: CPT | Mod: 26,LT,, | Performed by: RADIOLOGY

## 2021-10-12 PROCEDURE — 99999 PR PBB SHADOW E&M-EST. PATIENT-LVL V: ICD-10-PCS | Mod: PBBFAC,,, | Performed by: INTERNAL MEDICINE

## 2021-10-12 PROCEDURE — 73030 XR SHOULDER COMPLETE 2 OR MORE VIEWS LEFT: ICD-10-PCS | Mod: 26,LT,, | Performed by: RADIOLOGY

## 2021-10-12 PROCEDURE — 99214 OFFICE O/P EST MOD 30 MIN: CPT | Mod: S$PBB,,, | Performed by: INTERNAL MEDICINE

## 2021-10-12 PROCEDURE — 99215 OFFICE O/P EST HI 40 MIN: CPT | Mod: PBBFAC,PO | Performed by: INTERNAL MEDICINE

## 2021-10-12 PROCEDURE — 99999 PR PBB SHADOW E&M-EST. PATIENT-LVL V: CPT | Mod: PBBFAC,,, | Performed by: INTERNAL MEDICINE

## 2021-10-12 PROCEDURE — 99214 PR OFFICE/OUTPT VISIT, EST, LEVL IV, 30-39 MIN: ICD-10-PCS | Mod: S$PBB,,, | Performed by: INTERNAL MEDICINE

## 2021-10-12 PROCEDURE — 73030 X-RAY EXAM OF SHOULDER: CPT | Mod: TC,PO,LT

## 2021-10-12 RX ORDER — LISINOPRIL 20 MG/1
20 TABLET ORAL DAILY
Qty: 90 TABLET | Refills: 3 | Status: SHIPPED | OUTPATIENT
Start: 2021-10-12 | End: 2022-03-21

## 2021-10-12 RX ORDER — TESTOSTERONE CYPIONATE 200 MG/ML
INJECTION, SOLUTION INTRAMUSCULAR
Qty: 1 ML | Refills: 0 | Status: SHIPPED | OUTPATIENT
Start: 2021-10-12 | End: 2021-12-02

## 2021-10-15 ENCOUNTER — PATIENT OUTREACH (OUTPATIENT)
Dept: ADMINISTRATIVE | Facility: OTHER | Age: 66
End: 2021-10-15

## 2021-10-18 ENCOUNTER — TELEPHONE (OUTPATIENT)
Dept: PRIMARY CARE CLINIC | Facility: CLINIC | Age: 66
End: 2021-10-18

## 2021-10-18 ENCOUNTER — PATIENT MESSAGE (OUTPATIENT)
Dept: PRIMARY CARE CLINIC | Facility: CLINIC | Age: 66
End: 2021-10-18

## 2021-10-20 ENCOUNTER — OFFICE VISIT (OUTPATIENT)
Dept: UROLOGY | Facility: CLINIC | Age: 66
End: 2021-10-20
Payer: MEDICARE

## 2021-10-20 VITALS
DIASTOLIC BLOOD PRESSURE: 66 MMHG | HEIGHT: 72 IN | HEART RATE: 66 BPM | SYSTOLIC BLOOD PRESSURE: 138 MMHG | BODY MASS INDEX: 36.13 KG/M2 | WEIGHT: 266.75 LBS

## 2021-10-20 DIAGNOSIS — N52.01 ERECTILE DYSFUNCTION DUE TO ARTERIAL INSUFFICIENCY: ICD-10-CM

## 2021-10-20 DIAGNOSIS — E11.69 HYPERLIPIDEMIA ASSOCIATED WITH TYPE 2 DIABETES MELLITUS: Chronic | ICD-10-CM

## 2021-10-20 DIAGNOSIS — N13.8 BPH WITH URINARY OBSTRUCTION: ICD-10-CM

## 2021-10-20 DIAGNOSIS — E78.5 HYPERLIPIDEMIA ASSOCIATED WITH TYPE 2 DIABETES MELLITUS: Chronic | ICD-10-CM

## 2021-10-20 DIAGNOSIS — E29.1 MALE HYPOGONADISM: Primary | Chronic | ICD-10-CM

## 2021-10-20 DIAGNOSIS — E11.9 DM TYPE 2 WITHOUT RETINOPATHY: ICD-10-CM

## 2021-10-20 DIAGNOSIS — I15.2 HYPERTENSION ASSOCIATED WITH DIABETES: Chronic | ICD-10-CM

## 2021-10-20 DIAGNOSIS — N40.1 BPH WITH URINARY OBSTRUCTION: ICD-10-CM

## 2021-10-20 DIAGNOSIS — E11.59 HYPERTENSION ASSOCIATED WITH DIABETES: Chronic | ICD-10-CM

## 2021-10-20 PROBLEM — R94.39 ABNORMAL STRESS TEST: Status: RESOLVED | Noted: 2020-09-24 | Resolved: 2021-10-20

## 2021-10-20 PROCEDURE — 99999 PR PBB SHADOW E&M-EST. PATIENT-LVL IV: CPT | Mod: PBBFAC,,, | Performed by: UROLOGY

## 2021-10-20 PROCEDURE — 99214 OFFICE O/P EST MOD 30 MIN: CPT | Mod: PBBFAC | Performed by: UROLOGY

## 2021-10-20 PROCEDURE — 99999 PR PBB SHADOW E&M-EST. PATIENT-LVL IV: ICD-10-PCS | Mod: PBBFAC,,, | Performed by: UROLOGY

## 2021-10-20 PROCEDURE — 99203 PR OFFICE/OUTPT VISIT, NEW, LEVL III, 30-44 MIN: ICD-10-PCS | Mod: S$PBB,,, | Performed by: UROLOGY

## 2021-10-20 PROCEDURE — 99203 OFFICE O/P NEW LOW 30 MIN: CPT | Mod: S$PBB,,, | Performed by: UROLOGY

## 2021-10-29 ENCOUNTER — PATIENT MESSAGE (OUTPATIENT)
Dept: INTERNAL MEDICINE | Facility: CLINIC | Age: 66
End: 2021-10-29
Payer: MEDICARE

## 2021-11-04 ENCOUNTER — IMMUNIZATION (OUTPATIENT)
Dept: INTERNAL MEDICINE | Facility: CLINIC | Age: 66
End: 2021-11-04
Payer: MEDICARE

## 2021-11-04 DIAGNOSIS — Z23 NEED FOR VACCINATION: Primary | ICD-10-CM

## 2021-11-04 PROCEDURE — 0064A COVID-19, MRNA, LNP-S, PF, 100 MCG/0.25 ML DOSE VACCINE (MODERNA BOOSTER): CPT | Mod: PBBFAC

## 2021-11-08 RX ORDER — ALLOPURINOL 100 MG/1
TABLET ORAL
Qty: 30 TABLET | Refills: 10 | Status: SHIPPED | OUTPATIENT
Start: 2021-11-08 | End: 2022-10-11

## 2021-11-09 ENCOUNTER — OFFICE VISIT (OUTPATIENT)
Dept: INTERNAL MEDICINE | Facility: CLINIC | Age: 66
End: 2021-11-09
Payer: MEDICARE

## 2021-11-09 VITALS
SYSTOLIC BLOOD PRESSURE: 140 MMHG | RESPIRATION RATE: 16 BRPM | WEIGHT: 265.63 LBS | OXYGEN SATURATION: 97 % | BODY MASS INDEX: 35.98 KG/M2 | TEMPERATURE: 98 F | HEIGHT: 72 IN | HEART RATE: 78 BPM | DIASTOLIC BLOOD PRESSURE: 80 MMHG

## 2021-11-09 DIAGNOSIS — E11.59 HYPERTENSION ASSOCIATED WITH DIABETES: Chronic | ICD-10-CM

## 2021-11-09 DIAGNOSIS — I15.2 OBESITY, DIABETES, AND HYPERTENSION SYNDROME: ICD-10-CM

## 2021-11-09 DIAGNOSIS — N18.31 STAGE 3A CHRONIC KIDNEY DISEASE: ICD-10-CM

## 2021-11-09 DIAGNOSIS — I15.2 HYPERTENSION ASSOCIATED WITH DIABETES: Chronic | ICD-10-CM

## 2021-11-09 DIAGNOSIS — E66.01 SEVERE OBESITY (BMI 35.0-35.9 WITH COMORBIDITY): ICD-10-CM

## 2021-11-09 DIAGNOSIS — E66.9 OBESITY, DIABETES, AND HYPERTENSION SYNDROME: ICD-10-CM

## 2021-11-09 DIAGNOSIS — I25.10 CORONARY ARTERY DISEASE INVOLVING NATIVE CORONARY ARTERY OF NATIVE HEART WITHOUT ANGINA PECTORIS: ICD-10-CM

## 2021-11-09 DIAGNOSIS — E11.69 HYPERLIPIDEMIA ASSOCIATED WITH TYPE 2 DIABETES MELLITUS: Chronic | ICD-10-CM

## 2021-11-09 DIAGNOSIS — E78.5 HYPERLIPIDEMIA ASSOCIATED WITH TYPE 2 DIABETES MELLITUS: Chronic | ICD-10-CM

## 2021-11-09 DIAGNOSIS — E11.59 OBESITY, DIABETES, AND HYPERTENSION SYNDROME: ICD-10-CM

## 2021-11-09 DIAGNOSIS — N18.31 TYPE 2 DIABETES MELLITUS WITH STAGE 3A CHRONIC KIDNEY DISEASE, WITHOUT LONG-TERM CURRENT USE OF INSULIN: Primary | Chronic | ICD-10-CM

## 2021-11-09 DIAGNOSIS — E11.22 TYPE 2 DIABETES MELLITUS WITH STAGE 3A CHRONIC KIDNEY DISEASE, WITHOUT LONG-TERM CURRENT USE OF INSULIN: Primary | Chronic | ICD-10-CM

## 2021-11-09 DIAGNOSIS — E11.69 OBESITY, DIABETES, AND HYPERTENSION SYNDROME: ICD-10-CM

## 2021-11-09 PROCEDURE — 99214 OFFICE O/P EST MOD 30 MIN: CPT | Mod: S$PBB,,, | Performed by: NURSE PRACTITIONER

## 2021-11-09 PROCEDURE — 99214 PR OFFICE/OUTPT VISIT, EST, LEVL IV, 30-39 MIN: ICD-10-PCS | Mod: S$PBB,,, | Performed by: NURSE PRACTITIONER

## 2021-11-09 PROCEDURE — 99999 PR PBB SHADOW E&M-EST. PATIENT-LVL V: ICD-10-PCS | Mod: PBBFAC,,, | Performed by: NURSE PRACTITIONER

## 2021-11-09 PROCEDURE — 99215 OFFICE O/P EST HI 40 MIN: CPT | Mod: PBBFAC,PO | Performed by: NURSE PRACTITIONER

## 2021-11-09 PROCEDURE — 99999 PR PBB SHADOW E&M-EST. PATIENT-LVL V: CPT | Mod: PBBFAC,,, | Performed by: NURSE PRACTITIONER

## 2021-11-15 ENCOUNTER — PATIENT MESSAGE (OUTPATIENT)
Dept: UROLOGY | Facility: CLINIC | Age: 66
End: 2021-11-15
Payer: MEDICARE

## 2021-11-16 ENCOUNTER — CLINICAL SUPPORT (OUTPATIENT)
Dept: REHABILITATION | Facility: HOSPITAL | Age: 66
End: 2021-11-16
Attending: INTERNAL MEDICINE
Payer: MEDICARE

## 2021-11-16 DIAGNOSIS — G89.29 CHRONIC LEFT SHOULDER PAIN: Primary | ICD-10-CM

## 2021-11-16 DIAGNOSIS — M25.512 CHRONIC LEFT SHOULDER PAIN: Primary | ICD-10-CM

## 2021-11-16 DIAGNOSIS — M25.612 DECREASED RANGE OF MOTION OF LEFT SHOULDER: ICD-10-CM

## 2021-11-16 PROCEDURE — 97161 PT EVAL LOW COMPLEX 20 MIN: CPT | Mod: PO

## 2021-11-26 ENCOUNTER — CLINICAL SUPPORT (OUTPATIENT)
Dept: REHABILITATION | Facility: HOSPITAL | Age: 66
End: 2021-11-26
Attending: INTERNAL MEDICINE
Payer: MEDICARE

## 2021-11-26 DIAGNOSIS — G89.29 CHRONIC LEFT SHOULDER PAIN: ICD-10-CM

## 2021-11-26 DIAGNOSIS — M25.512 CHRONIC LEFT SHOULDER PAIN: ICD-10-CM

## 2021-11-26 PROCEDURE — 97110 THERAPEUTIC EXERCISES: CPT | Mod: PO

## 2021-11-26 PROCEDURE — 97140 MANUAL THERAPY 1/> REGIONS: CPT | Mod: PO

## 2021-12-02 ENCOUNTER — CLINICAL SUPPORT (OUTPATIENT)
Dept: REHABILITATION | Facility: HOSPITAL | Age: 66
End: 2021-12-02
Attending: INTERNAL MEDICINE
Payer: MEDICARE

## 2021-12-02 ENCOUNTER — OFFICE VISIT (OUTPATIENT)
Dept: INTERNAL MEDICINE | Facility: CLINIC | Age: 66
End: 2021-12-02
Payer: MEDICARE

## 2021-12-02 VITALS
BODY MASS INDEX: 36.13 KG/M2 | RESPIRATION RATE: 18 BRPM | TEMPERATURE: 98 F | SYSTOLIC BLOOD PRESSURE: 130 MMHG | WEIGHT: 266.75 LBS | DIASTOLIC BLOOD PRESSURE: 80 MMHG | OXYGEN SATURATION: 96 % | HEART RATE: 58 BPM | HEIGHT: 72 IN

## 2021-12-02 DIAGNOSIS — R59.0 CERVICAL LYMPHADENOPATHY: ICD-10-CM

## 2021-12-02 DIAGNOSIS — E11.59 HYPERTENSION ASSOCIATED WITH DIABETES: Chronic | ICD-10-CM

## 2021-12-02 DIAGNOSIS — I15.2 HYPERTENSION ASSOCIATED WITH DIABETES: Chronic | ICD-10-CM

## 2021-12-02 DIAGNOSIS — E11.22 TYPE 2 DIABETES MELLITUS WITH STAGE 3A CHRONIC KIDNEY DISEASE, WITHOUT LONG-TERM CURRENT USE OF INSULIN: Primary | Chronic | ICD-10-CM

## 2021-12-02 DIAGNOSIS — M25.512 CHRONIC LEFT SHOULDER PAIN: ICD-10-CM

## 2021-12-02 DIAGNOSIS — N18.31 TYPE 2 DIABETES MELLITUS WITH STAGE 3A CHRONIC KIDNEY DISEASE, WITHOUT LONG-TERM CURRENT USE OF INSULIN: Primary | Chronic | ICD-10-CM

## 2021-12-02 DIAGNOSIS — E11.9 DM TYPE 2 WITHOUT RETINOPATHY: ICD-10-CM

## 2021-12-02 DIAGNOSIS — G89.29 CHRONIC LEFT SHOULDER PAIN: ICD-10-CM

## 2021-12-02 PROCEDURE — 99215 OFFICE O/P EST HI 40 MIN: CPT | Mod: PBBFAC,PO | Performed by: INTERNAL MEDICINE

## 2021-12-02 PROCEDURE — 97140 MANUAL THERAPY 1/> REGIONS: CPT | Mod: PO

## 2021-12-02 PROCEDURE — 99999 PR PBB SHADOW E&M-EST. PATIENT-LVL V: ICD-10-PCS | Mod: PBBFAC,,, | Performed by: INTERNAL MEDICINE

## 2021-12-02 PROCEDURE — 99214 OFFICE O/P EST MOD 30 MIN: CPT | Mod: S$PBB,,, | Performed by: INTERNAL MEDICINE

## 2021-12-02 PROCEDURE — 99999 PR PBB SHADOW E&M-EST. PATIENT-LVL V: CPT | Mod: PBBFAC,,, | Performed by: INTERNAL MEDICINE

## 2021-12-02 PROCEDURE — 99214 PR OFFICE/OUTPT VISIT, EST, LEVL IV, 30-39 MIN: ICD-10-PCS | Mod: S$PBB,,, | Performed by: INTERNAL MEDICINE

## 2021-12-02 PROCEDURE — 97110 THERAPEUTIC EXERCISES: CPT | Mod: PO

## 2021-12-02 RX ORDER — LANCETS
EACH MISCELLANEOUS
COMMUNITY
Start: 2021-10-15 | End: 2021-12-13 | Stop reason: SDUPTHER

## 2021-12-08 ENCOUNTER — PATIENT MESSAGE (OUTPATIENT)
Dept: UROLOGY | Facility: CLINIC | Age: 66
End: 2021-12-08
Payer: MEDICARE

## 2021-12-09 ENCOUNTER — PATIENT MESSAGE (OUTPATIENT)
Dept: INTERNAL MEDICINE | Facility: CLINIC | Age: 66
End: 2021-12-09
Payer: MEDICARE

## 2021-12-09 ENCOUNTER — CLINICAL SUPPORT (OUTPATIENT)
Dept: REHABILITATION | Facility: HOSPITAL | Age: 66
End: 2021-12-09
Attending: INTERNAL MEDICINE
Payer: MEDICARE

## 2021-12-09 DIAGNOSIS — M25.512 CHRONIC LEFT SHOULDER PAIN: ICD-10-CM

## 2021-12-09 DIAGNOSIS — G89.29 CHRONIC LEFT SHOULDER PAIN: ICD-10-CM

## 2021-12-09 PROCEDURE — 97110 THERAPEUTIC EXERCISES: CPT | Mod: PO,CQ

## 2021-12-09 PROCEDURE — 97140 MANUAL THERAPY 1/> REGIONS: CPT | Mod: PO,CQ

## 2021-12-13 ENCOUNTER — CLINICAL SUPPORT (OUTPATIENT)
Dept: REHABILITATION | Facility: HOSPITAL | Age: 66
End: 2021-12-13
Attending: INTERNAL MEDICINE
Payer: MEDICARE

## 2021-12-13 ENCOUNTER — LAB VISIT (OUTPATIENT)
Dept: LAB | Facility: HOSPITAL | Age: 66
End: 2021-12-13
Attending: UROLOGY
Payer: MEDICARE

## 2021-12-13 DIAGNOSIS — G89.29 CHRONIC LEFT SHOULDER PAIN: ICD-10-CM

## 2021-12-13 DIAGNOSIS — E29.1 MALE HYPOGONADISM: Chronic | ICD-10-CM

## 2021-12-13 DIAGNOSIS — E11.65 UNCONTROLLED TYPE 2 DIABETES MELLITUS WITH HYPERGLYCEMIA: Primary | ICD-10-CM

## 2021-12-13 DIAGNOSIS — M25.512 CHRONIC LEFT SHOULDER PAIN: ICD-10-CM

## 2021-12-13 LAB — TESTOST SERPL-MCNC: 255 NG/DL (ref 304–1227)

## 2021-12-13 PROCEDURE — 36415 COLL VENOUS BLD VENIPUNCTURE: CPT | Mod: PO | Performed by: UROLOGY

## 2021-12-13 PROCEDURE — 97140 MANUAL THERAPY 1/> REGIONS: CPT | Mod: PO

## 2021-12-13 PROCEDURE — 97110 THERAPEUTIC EXERCISES: CPT | Mod: PO

## 2021-12-13 PROCEDURE — 84403 ASSAY OF TOTAL TESTOSTERONE: CPT | Performed by: UROLOGY

## 2021-12-13 RX ORDER — LANCETS
1 EACH MISCELLANEOUS
Qty: 100 EACH | Refills: 6 | Status: SHIPPED | OUTPATIENT
Start: 2021-12-13 | End: 2022-01-10

## 2021-12-14 ENCOUNTER — TELEPHONE (OUTPATIENT)
Dept: UROLOGY | Facility: CLINIC | Age: 66
End: 2021-12-14
Payer: MEDICARE

## 2021-12-14 DIAGNOSIS — E29.1 MALE HYPOGONADISM: Primary | ICD-10-CM

## 2021-12-17 ENCOUNTER — HOSPITAL ENCOUNTER (OUTPATIENT)
Dept: RADIOLOGY | Facility: HOSPITAL | Age: 66
Discharge: HOME | End: 2021-12-17
Attending: INTERNAL MEDICINE
Payer: MEDICARE

## 2021-12-17 DIAGNOSIS — R59.0 CERVICAL LYMPHADENOPATHY: ICD-10-CM

## 2021-12-17 PROCEDURE — 76536 US EXAM OF HEAD AND NECK: CPT | Mod: TC

## 2021-12-17 PROCEDURE — 76536 US EXAM OF HEAD AND NECK: CPT | Mod: 26,,, | Performed by: STUDENT IN AN ORGANIZED HEALTH CARE EDUCATION/TRAINING PROGRAM

## 2021-12-17 PROCEDURE — 76536 US SOFT TISSUE HEAD NECK THYROID: ICD-10-PCS | Mod: 26,,, | Performed by: STUDENT IN AN ORGANIZED HEALTH CARE EDUCATION/TRAINING PROGRAM

## 2021-12-19 ENCOUNTER — PATIENT MESSAGE (OUTPATIENT)
Dept: INTERNAL MEDICINE | Facility: CLINIC | Age: 66
End: 2021-12-19
Payer: MEDICARE

## 2021-12-20 ENCOUNTER — PATIENT MESSAGE (OUTPATIENT)
Dept: INTERNAL MEDICINE | Facility: CLINIC | Age: 66
End: 2021-12-20
Payer: MEDICARE

## 2021-12-20 ENCOUNTER — LAB VISIT (OUTPATIENT)
Dept: LAB | Facility: HOSPITAL | Age: 66
End: 2021-12-20
Attending: UROLOGY
Payer: MEDICARE

## 2021-12-20 DIAGNOSIS — E29.1 MALE HYPOGONADISM: ICD-10-CM

## 2021-12-20 LAB
LH SERPL-ACNC: 4.7 MIU/ML (ref 0.6–12.1)
PROLACTIN SERPL IA-MCNC: 7.6 NG/ML (ref 3.5–19.4)
TESTOST SERPL-MCNC: 190 NG/DL (ref 304–1227)

## 2021-12-20 PROCEDURE — 84403 ASSAY OF TOTAL TESTOSTERONE: CPT | Performed by: UROLOGY

## 2021-12-20 PROCEDURE — 84146 ASSAY OF PROLACTIN: CPT | Performed by: UROLOGY

## 2021-12-20 PROCEDURE — 36415 COLL VENOUS BLD VENIPUNCTURE: CPT | Mod: PO | Performed by: UROLOGY

## 2021-12-20 PROCEDURE — 83002 ASSAY OF GONADOTROPIN (LH): CPT | Performed by: UROLOGY

## 2021-12-21 ENCOUNTER — TELEPHONE (OUTPATIENT)
Dept: UROLOGY | Facility: CLINIC | Age: 66
End: 2021-12-21
Payer: MEDICARE

## 2021-12-23 NOTE — LETTER
March 6, 2020      Geni Morrison, NP  [address]  [place] [address]           [place] [facility]  [address]  [place] [address]  Phone: [phone]  Fax: [phone]          Patient: Lukasz Person   MR Number: [id number]   YOB: 1955   Date of Visit: 3/6/2020       Dear Geni Morrison:    Thank you for referring Lukasz Person to me for evaluation. Attached you will find relevant portions of my assessment and plan of care.    If you have questions, please do not hesitate to call me. I look forward to following Lukasz Person along with you.    Sincerely,    Johanny Sutton, OD    Enclosure  CC:  No Recipients    If you would like to receive this communication electronically, please contact [email or web address] or [phone] to request more information on [employer] access.    For providers and/or their staff who would like to refer a patient to [facility], please contact us through our one-stop-shop provider referral line, [facility], at [phone].    If you feel you have received this communication in error or would no longer like to receive these types of communications, please e-mail [email or web address]          Rapid Response Progress Note    Time RRT called: 1455  Why RRT called: Unresponsiveness/ bradycardia  Location: T4 Rm. 4540  Responding MD: Dr. Delonte Goodwin   Interventions: EKG, labs ordered, ABG, 1L bolus. Assisted patient back from bathroom to bed with mechanical lift. Attending notified for recommendation for cardiology consult. Awaiting call back.  Disposition: Remains on unit.   Pt condition post RRT: Patient alert, and asking if he can go to sleep. No complaints of dizziness and/ or light- headedness post RRT.     RRT Follow up:    ALERT RN Name: Sammy     Time: 0251    Date: 12/24/21    Room 4540    Bedside RN: Chey    Updated: Patient is V/S stable, to remain on bedrest. No issues at the moment.

## 2021-12-27 ENCOUNTER — CLINICAL SUPPORT (OUTPATIENT)
Dept: REHABILITATION | Facility: HOSPITAL | Age: 66
End: 2021-12-27
Attending: INTERNAL MEDICINE
Payer: MEDICARE

## 2021-12-27 ENCOUNTER — LAB VISIT (OUTPATIENT)
Dept: LAB | Facility: HOSPITAL | Age: 66
End: 2021-12-27
Attending: OTOLARYNGOLOGY
Payer: MEDICARE

## 2021-12-27 ENCOUNTER — OFFICE VISIT (OUTPATIENT)
Dept: OTOLARYNGOLOGY | Facility: CLINIC | Age: 66
End: 2021-12-27
Payer: MEDICARE

## 2021-12-27 VITALS
DIASTOLIC BLOOD PRESSURE: 71 MMHG | HEART RATE: 60 BPM | SYSTOLIC BLOOD PRESSURE: 141 MMHG | BODY MASS INDEX: 35.76 KG/M2 | WEIGHT: 263.69 LBS

## 2021-12-27 DIAGNOSIS — R22.1 NECK MASS: ICD-10-CM

## 2021-12-27 DIAGNOSIS — E04.2 MULTIPLE THYROID NODULES: ICD-10-CM

## 2021-12-27 DIAGNOSIS — R59.0 CERVICAL LYMPHADENOPATHY: Primary | ICD-10-CM

## 2021-12-27 DIAGNOSIS — M25.512 CHRONIC LEFT SHOULDER PAIN: ICD-10-CM

## 2021-12-27 DIAGNOSIS — R59.0 CERVICAL LYMPHADENOPATHY: ICD-10-CM

## 2021-12-27 DIAGNOSIS — G89.29 CHRONIC LEFT SHOULDER PAIN: ICD-10-CM

## 2021-12-27 LAB
CREAT SERPL-MCNC: 1.1 MG/DL (ref 0.5–1.4)
EST. GFR  (AFRICAN AMERICAN): >60 ML/MIN/1.73 M^2
EST. GFR  (NON AFRICAN AMERICAN): >60 ML/MIN/1.73 M^2

## 2021-12-27 PROCEDURE — 99999 PR PBB SHADOW E&M-EST. PATIENT-LVL IV: ICD-10-PCS | Mod: PBBFAC,,, | Performed by: OTOLARYNGOLOGY

## 2021-12-27 PROCEDURE — 97110 THERAPEUTIC EXERCISES: CPT | Mod: PO

## 2021-12-27 PROCEDURE — 99999 PR PBB SHADOW E&M-EST. PATIENT-LVL IV: CPT | Mod: PBBFAC,,, | Performed by: OTOLARYNGOLOGY

## 2021-12-27 PROCEDURE — 36415 COLL VENOUS BLD VENIPUNCTURE: CPT | Performed by: OTOLARYNGOLOGY

## 2021-12-27 PROCEDURE — 97140 MANUAL THERAPY 1/> REGIONS: CPT | Mod: PO

## 2021-12-27 PROCEDURE — 82565 ASSAY OF CREATININE: CPT | Performed by: OTOLARYNGOLOGY

## 2021-12-27 PROCEDURE — 99204 OFFICE O/P NEW MOD 45 MIN: CPT | Mod: S$PBB,,, | Performed by: OTOLARYNGOLOGY

## 2021-12-27 PROCEDURE — 99204 PR OFFICE/OUTPT VISIT, NEW, LEVL IV, 45-59 MIN: ICD-10-PCS | Mod: S$PBB,,, | Performed by: OTOLARYNGOLOGY

## 2021-12-27 PROCEDURE — 99214 OFFICE O/P EST MOD 30 MIN: CPT | Mod: PBBFAC | Performed by: OTOLARYNGOLOGY

## 2021-12-31 ENCOUNTER — LAB VISIT (OUTPATIENT)
Dept: PRIMARY CARE CLINIC | Facility: OTHER | Age: 66
End: 2021-12-31
Attending: INTERNAL MEDICINE
Payer: MEDICARE

## 2021-12-31 DIAGNOSIS — Z20.822 ENCOUNTER FOR LABORATORY TESTING FOR COVID-19 VIRUS: ICD-10-CM

## 2021-12-31 PROCEDURE — U0003 INFECTIOUS AGENT DETECTION BY NUCLEIC ACID (DNA OR RNA); SEVERE ACUTE RESPIRATORY SYNDROME CORONAVIRUS 2 (SARS-COV-2) (CORONAVIRUS DISEASE [COVID-19]), AMPLIFIED PROBE TECHNIQUE, MAKING USE OF HIGH THROUGHPUT TECHNOLOGIES AS DESCRIBED BY CMS-2020-01-R: HCPCS | Mod: ST72 | Performed by: INTERNAL MEDICINE

## 2022-01-01 ENCOUNTER — PATIENT MESSAGE (OUTPATIENT)
Dept: ADMINISTRATIVE | Facility: OTHER | Age: 67
End: 2022-01-01
Payer: MEDICARE

## 2022-01-04 LAB
SARS-COV-2 RNA RESP QL NAA+PROBE: NOT DETECTED
SARS-COV-2- CYCLE NUMBER: NORMAL

## 2022-01-05 ENCOUNTER — PATIENT OUTREACH (OUTPATIENT)
Dept: ADMINISTRATIVE | Facility: OTHER | Age: 67
End: 2022-01-05
Payer: MEDICARE

## 2022-01-05 ENCOUNTER — CLINICAL SUPPORT (OUTPATIENT)
Dept: REHABILITATION | Facility: HOSPITAL | Age: 67
End: 2022-01-05
Attending: INTERNAL MEDICINE
Payer: MEDICARE

## 2022-01-05 DIAGNOSIS — G89.29 CHRONIC LEFT SHOULDER PAIN: ICD-10-CM

## 2022-01-05 DIAGNOSIS — M25.512 CHRONIC LEFT SHOULDER PAIN: ICD-10-CM

## 2022-01-05 PROCEDURE — 97110 THERAPEUTIC EXERCISES: CPT | Mod: PO

## 2022-01-05 PROCEDURE — 97140 MANUAL THERAPY 1/> REGIONS: CPT | Mod: PO

## 2022-01-05 NOTE — PROGRESS NOTES
"  Physical Therapy Daily Treatment Note and Progress Note     Name: Lukasz Person  Clinic Number: [id number]    Therapy Diagnosis:   Encounter Diagnosis   Name Primary?    Chronic left shoulder pain      Physician: Kirk Wilson MD    Visit Date: 1/5/2022    Physician Orders: PT Eval and Treat   Medical Diagnosis from Referral: M25.512,G89.29 (ICD-10-CM) - Chronic left shoulder pain  Evaluation Date: 11/16/2021  Authorization Period Expiration: 12/31/2022  Plan of Care Expiration: 2/16/2022  Progress Note Due: 1/27/2022  Visit # / Visits authorized: 1/20   FOTO: 1/ 3      Time In: 230pm  Time Out: 315pm  Total Billable Time: 45 minutes    Precautions: Standard, Diabetes and Kidney Disease, HTN, CAD     Subjective     Pt reports: that he is not having any pain at this time and overall he has been having less pain throughout his day.   He was compliant with home exercise program.  Response to previous treatment: Improved shoulder pain, albeit intermittent pain continues.  Functional change: Ongoing  Pain: 0/10 (2/10 minor discomfort)  Location: Left shoulder      Objective       Lukasz received therapeutic exercises to develop strength, endurance and ROM for 33 minutes including:    +Rotator-Interval Stretch, 2 minutes, 4# cuff weight  +Scapular Retractions, prone, 20x5" hold  +Crossbody stretch on the wall, 15x 10" holds  +E.R. Wall Stretch, at 25, 45 and 90 degrees GH Flexion, 1x30" each height  +I.R Stretch, band, 5x10" hold  +ER and IR walkouts with GTB, 2 x 10 each  +Open Books, supine, 10x each side  SL ER, + 1# cuff weight 2x10  Serratus Wall Slides, 2x12, left  B shoulder ER with OTB, 2 x 10  Thoracic Extension, seated, 15x5" hold  Supine serratus punches 2x10    Lukasz received the following manual therapy techniques: Joint mobilizations were applied to the: Left shoulder for 12 minutes, including:  +Grade IV Thoracic CPA Mobilizations, T1-T9 --> f/b Grade V Mobilizations of Thoracic Spine  Posterior GH Joint " "Mobs, grades IV, 3x 1 minute sustained hold  Inferior GH Joint Mobs + End-Range Flexion, Grade IV, 3x30" hold  ER and IR gentle Resisted ROM, MET 5x5" hold + 20" hold   Crossover MET to improve horizontal adduction, 5x 5" holds  GH Flexion MET (resisted extension in supine), 5x5" hold, 20" hold  Supine Rhythmic Stabilization, scapular plane + Serratus Punch ,5 x 30"    Home Exercises Provided and Patient Education Provided     Education provided:   - Home exercise program  - Role of PT  - Findings of evaluation and plan of care   - PT/PTA and Teams System     Written Home Exercises Provided: Patient instructed to cont prior HEP.  Exercises were reviewed and Lukasz was able to demonstrate them prior to the end of the session.  Lukasz demonstrated good  understanding of the education provided.     See EMR under Patient Instructions for exercises provided 11/16/2021.    Assessment     Pt presents to PT today with no complaints of pain. His L GH joint mobility continues to be limited, especially in the posterior direction. He has posterior cuff flexibility so crossover METs were performed today with good in session improvement. His excessive thoracic kyphosis seems seems to also be limiting his overhead motion so thoracic extension training was added today.     Lukasz is progressing well towards his goals.   Pt prognosis is Good.     Pt will continue to benefit from skilled outpatient physical therapy to address the deficits listed in the problem list box on initial evaluation, provide pt/family education and to maximize pt's level of independence in the home and community environment.     Pt's spiritual, cultural and educational needs considered and pt agreeable to plan of care and goals.     Anticipated barriers to physical therapy: scheduling    Goals:  Short Term Goals: 4 weeks   1.) Patient will demonstrate independence in compliance and technique of home exercise program provided as per teach-back method of " assessment. Ongoing  2.) Patient to improve passive internal rotation by +15 degrees to improve ability to reach behind back. Ongoing  3.) Patient to reach backward to belt line with ease and fluidity of motion without pain >1/10. Ongoing  4.) Patient to improve FOTO score to <15% disability Ongoing  5.) Patient to improve DASH score to <5 points to demonstrate improvement in ability to complete ADL's. Ongoing        Long Term Goals: 6 weeks   1.) Patient will demonstrate independence in compliance and technique of home exercise program provided as per teach-back method of assessment.  2.) Patient to improve passive internal rotation to that which is symmetrical to unaffected UE to improve ability to reach behind back.  3.) Patient to reach backward to ~T12 with ease and fluidity of motion without pain >1/10.  4.) Patient to improve FOTO score to <5% disability  5.) Patient to improve DASH score to <5 points to demonstrate improvement in ability to complete ADL's.    Plan     Plan of care Certification: 11/16/2021 to 2/16/2021.     Outpatient Physical Therapy 2 times weekly for 8 weeks to include the following interventions: Manual Therapy, Moist Heat/ Ice, Neuromuscular Re-ed, Patient Education, Self Care, Therapeutic Activites and Therapeutic Exercise.     Continue as per current plan of care for additional 3-4 weeks with assessment for progress and carry-over between sessions with referral for additional assessment if minimal progress has been made at this time.    JOSE FRANCISCO ORDONEZ, PT

## 2022-01-05 NOTE — PROGRESS NOTES
Care Everywhere: updated  Immunization: updated  Health Maintenance: updated  Media Review: review for outside colon cancer report   Legacy Review:   DIS:  Order placed:   Upcoming appts:optometry 2.8.2021  EFAX:  Task Tickets:  Referrals:

## 2022-01-06 ENCOUNTER — OFFICE VISIT (OUTPATIENT)
Dept: UROLOGY | Facility: CLINIC | Age: 67
End: 2022-01-06
Payer: MEDICARE

## 2022-01-06 ENCOUNTER — OFFICE VISIT (OUTPATIENT)
Dept: DERMATOLOGY | Facility: CLINIC | Age: 67
End: 2022-01-06
Payer: MEDICARE

## 2022-01-06 ENCOUNTER — HOSPITAL ENCOUNTER (OUTPATIENT)
Dept: RADIOLOGY | Facility: HOSPITAL | Age: 67
Discharge: HOME | End: 2022-01-06
Attending: OTOLARYNGOLOGY
Payer: MEDICARE

## 2022-01-06 ENCOUNTER — TELEPHONE (OUTPATIENT)
Dept: OTOLARYNGOLOGY | Facility: CLINIC | Age: 67
End: 2022-01-06
Payer: MEDICARE

## 2022-01-06 VITALS — BODY MASS INDEX: 35.67 KG/M2 | WEIGHT: 263 LBS

## 2022-01-06 VITALS
DIASTOLIC BLOOD PRESSURE: 69 MMHG | HEART RATE: 63 BPM | SYSTOLIC BLOOD PRESSURE: 151 MMHG | WEIGHT: 270.31 LBS | BODY MASS INDEX: 36.61 KG/M2 | HEIGHT: 72 IN

## 2022-01-06 DIAGNOSIS — R59.0 CERVICAL LYMPHADENOPATHY: ICD-10-CM

## 2022-01-06 DIAGNOSIS — Z79.899 ENCOUNTER FOR LONG-TERM (CURRENT) USE OF HIGH-RISK MEDICATION: ICD-10-CM

## 2022-01-06 DIAGNOSIS — L98.9 SKIN LESION: ICD-10-CM

## 2022-01-06 DIAGNOSIS — Z12.83 SKIN EXAM, SCREENING FOR CANCER: ICD-10-CM

## 2022-01-06 DIAGNOSIS — L82.1 SEBORRHEIC KERATOSES: Primary | ICD-10-CM

## 2022-01-06 DIAGNOSIS — N13.8 BPH WITH URINARY OBSTRUCTION: ICD-10-CM

## 2022-01-06 DIAGNOSIS — N40.1 BPH WITH URINARY OBSTRUCTION: ICD-10-CM

## 2022-01-06 DIAGNOSIS — N52.01 ERECTILE DYSFUNCTION DUE TO ARTERIAL INSUFFICIENCY: ICD-10-CM

## 2022-01-06 DIAGNOSIS — E29.1 MALE HYPOGONADISM: Primary | ICD-10-CM

## 2022-01-06 DIAGNOSIS — L81.4 LENTIGINES: ICD-10-CM

## 2022-01-06 PROCEDURE — 99999 PR PBB SHADOW E&M-EST. PATIENT-LVL IV: ICD-10-PCS | Mod: PBBFAC,,, | Performed by: UROLOGY

## 2022-01-06 PROCEDURE — 99214 OFFICE O/P EST MOD 30 MIN: CPT | Mod: S$PBB,,, | Performed by: UROLOGY

## 2022-01-06 PROCEDURE — 99203 PR OFFICE/OUTPT VISIT, NEW, LEVL III, 30-44 MIN: ICD-10-PCS | Mod: S$PBB,,, | Performed by: DERMATOLOGY

## 2022-01-06 PROCEDURE — 99999 PR PBB SHADOW E&M-EST. PATIENT-LVL IV: CPT | Mod: PBBFAC,,, | Performed by: UROLOGY

## 2022-01-06 PROCEDURE — 70491 CT SOFT TISSUE NECK W/DYE: CPT | Mod: TC

## 2022-01-06 PROCEDURE — 70491 CT SOFT TISSUE NECK W/DYE: CPT | Mod: 26,,, | Performed by: RADIOLOGY

## 2022-01-06 PROCEDURE — 99999 PR PBB SHADOW E&M-EST. PATIENT-LVL III: CPT | Mod: PBBFAC,,, | Performed by: DERMATOLOGY

## 2022-01-06 PROCEDURE — 99214 OFFICE O/P EST MOD 30 MIN: CPT | Mod: PBBFAC,27 | Performed by: UROLOGY

## 2022-01-06 PROCEDURE — 99213 OFFICE O/P EST LOW 20 MIN: CPT | Mod: PBBFAC,PO,25 | Performed by: DERMATOLOGY

## 2022-01-06 PROCEDURE — 99999 PR PBB SHADOW E&M-EST. PATIENT-LVL III: ICD-10-PCS | Mod: PBBFAC,,, | Performed by: DERMATOLOGY

## 2022-01-06 PROCEDURE — 99203 OFFICE O/P NEW LOW 30 MIN: CPT | Mod: S$PBB,,, | Performed by: DERMATOLOGY

## 2022-01-06 PROCEDURE — 70491 CT SOFT TISSUE NECK WITH CONTRAST: ICD-10-PCS | Mod: 26,,, | Performed by: RADIOLOGY

## 2022-01-06 PROCEDURE — 99214 PR OFFICE/OUTPT VISIT, EST, LEVL IV, 30-39 MIN: ICD-10-PCS | Mod: S$PBB,,, | Performed by: UROLOGY

## 2022-01-06 PROCEDURE — 25500020 PHARM REV CODE 255: Performed by: OTOLARYNGOLOGY

## 2022-01-06 RX ORDER — TESTOSTERONE 20.25 MG/1.25G
GEL TOPICAL
Qty: 1 EACH | Refills: 5 | Status: SHIPPED | OUTPATIENT
Start: 2022-01-06 | End: 2022-04-11

## 2022-01-06 RX ADMIN — IOHEXOL 100 ML: 350 INJECTION, SOLUTION INTRAVENOUS at 03:01

## 2022-01-06 NOTE — PROGRESS NOTES
Subjective:       Patient ID:  Lukasz Person is a 66 y.o. male who presents for   Chief Complaint   Patient presents with    Skin Check    Spot     legs     New lesions on legs, would like skin check.       Review of Systems   Constitutional: Negative for fever, chills, weight loss, weight gain, fatigue, night sweats and malaise.   Skin: Positive for activity-related sunscreen use and wears hat. Negative for daily sunscreen use.   Hematologic/Lymphatic: Does not bruise/bleed easily.        Objective:    Physical Exam   Constitutional: He appears well-developed and well-nourished. No distress.   Neurological: He is alert and oriented to person, place, and time. He is not disoriented.   Psychiatric: He has a normal mood and affect.   Skin:   Areas Examined (abnormalities noted in diagram):   Head / Face Inspection Performed  Neck Inspection Performed  Chest / Axilla Inspection Performed  Back Inspection Performed  RUE Inspected  LUE Inspection Performed  RLE Inspected  LLE Inspection Performed  Nails and Digits Inspection Performed                   Diagram Legend     Erythematous scaling macule/papule c/w actinic keratosis       Vascular papule c/w angioma      Pigmented verrucoid papule/plaque c/w seborrheic keratosis      Yellow umbilicated papule c/w sebaceous hyperplasia      Irregularly shaped tan macule c/w lentigo     1-2 mm smooth white papules consistent with Milia      Movable subcutaneous cyst with punctum c/w epidermal inclusion cyst      Subcutaneous movable cyst c/w pilar cyst      Firm pink to brown papule c/w dermatofibroma      Pedunculated fleshy papule(s) c/w skin tag(s)      Evenly pigmented macule c/w junctional nevus     Mildly variegated pigmented, slightly irregular-bordered macule c/w mildly atypical nevus      Flesh colored to evenly pigmented papule c/w intradermal nevus       Pink pearly papule/plaque c/w basal cell carcinoma      Erythematous hyperkeratotic cursted plaque c/w SCC       "Surgical scar with no sign of skin cancer recurrence      Open and closed comedones      Inflammatory papules and pustules      Verrucoid papule consistent consistent with wart     Erythematous eczematous patches and plaques     Dystrophic onycholytic nail with subungual debris c/w onychomycosis     Umbilicated papule    Erythematous-base heme-crusted tan verrucoid plaque consistent with inflamed seborrheic keratosis     Erythematous Silvery Scaling Plaque c/w Psoriasis     See annotation      Assessment / Plan:        Seborrheic keratoses  reassurance  Brochure provided      Skin lesion  -     Ambulatory referral/consult to Dermatology    Lentigines  The "ABCD" rules to observe pigmented lesions were reviewed.  sunscreen    Skin exam, screening for cancer  . No lesions suspicious for malignancy noted.    Recommend daily sun protection/avoidance and use of at least SPF 30, broad spectrum sunscreen (OTC drug).                Follow up in about 1 year (around 1/6/2023).  "

## 2022-01-06 NOTE — PROGRESS NOTES
CHIEF COMPLAINT:    Mr. Person is a 66 y.o. male presenting with hypogonadism.    PRESENTING ILLNESS:    Lukasz Person is a 66 y.o. male who c/o hypogonadism.  Has been on TRT in the past and has more energy with TRT.      He has ED.  Has an Rx for NTG.  He's still talking ICI over with his wife.    He has LUTS.  Nocturia x 1.  Is pleased with how he voids.    REVIEW OF SYSTEMS:    Lukasz Person denies headache, blurred vision, fever, nausea, vomiting, chills, abdominal pain, chest pain, sore throat, bleeding per rectum, cough, SOB, recent loss of consciousness, recent mental status changes, seizures, dizziness, or upper or lower extremity weakness.    LOLA  1. 1  2. 5  3. 4  4. 4  5. 4      PATIENT HISTORY:    Past Medical History:   Diagnosis Date    Diabetes mellitus     Onset late 50s/early 60s    Hyperlipidemia     Hypertension     Onset late 50s/early 60s    Sleep apnea     since 2006       Past Surgical History:   Procedure Laterality Date    CORONARY ANGIOGRAPHY N/A 9/30/2020    Procedure: ANGIOGRAM, CORONARY ARTERY;  Surgeon: John West MD;  Location: [facility] CATH LAB;  Service: Cardiology;  Laterality: N/A;    LEFT HEART CATHETERIZATION Left 9/30/2020    Procedure: Left heart cath;  Surgeon: John Wets MD;  Location: [facility] CATH LAB;  Service: Cardiology;  Laterality: Left;    LITHOTRIPSY      PARATHYROIDECTOMY  1/1/2-107       Family History   Problem Relation Age of Onset    Heart disease Father 70        CABG    Arthritis Father     Diabetes Father     Kidney disease Father         had one kidney removed in early thirties    Arthritis Mother     Colon cancer Brother 32    Diabetes Paternal Grandmother     Colon polyps Paternal Grandfather     Colon cancer Paternal Grandfather     Cancer Paternal Grandfather         colon cancer at age 62    Alcohol abuse Paternal Aunt     Arthritis Sister     Arthritis Sister     Arthritis Brother     Cancer Brother         colon cancer  at age 32    Cancer Maternal Grandfather         throat cancer    Hypertension Sister        Social History     Socioeconomic History    Marital status:    Tobacco Use    Smoking status: Former Smoker     Packs/day: 1.00     Years: 7.00     Pack years: 7.00     Types: Cigarettes, Cigars     Start date: 1972     Quit date:      Years since quittin.6    Smokeless tobacco: Never Used    Tobacco comment: smoking was off and on.  cumulative 7 years.  never more than three years in one stretch or more lj   Substance and Sexual Activity    Alcohol use: Yes     Alcohol/week: 3.0 standard drinks     Types: 2 Cans of beer, 1 Shots of liquor per week     Comment: don't drink regularly.  when I do, three beers or shots    Drug use: Not Currently     Types: Marijuana    Sexual activity: Not Currently     Partners: Female     Birth control/protection: None     Comment:      Social Determinants of Health     Financial Resource Strain: Low Risk     Difficulty of Paying Living Expenses: Not very hard   Food Insecurity: No Food Insecurity    Worried About Running Out of Food in the Last Year: Never true    Ran Out of Food in the Last Year: Never true   Transportation Needs: No Transportation Needs    Lack of Transportation (Medical): No    Lack of Transportation (Non-Medical): No   Physical Activity: Insufficiently Active    Days of Exercise per Week: 4 days    Minutes of Exercise per Session: 30 min   Stress: No Stress Concern Present    Feeling of Stress : Only a little   Social Connections: Unknown    Frequency of Communication with Friends and Family: Once a week    Frequency of Social Gatherings with Friends and Family: Once a week    Active Member of Clubs or Organizations: No    Attends Club or Organization Meetings: Never    Marital Status:    Housing Stability: Low Risk     Unable to Pay for Housing in the Last Year: No    Number of Places Lived in the Last Year: 1     Unstable Housing in the Last Year: No       Allergies:  Patient has no known allergies.    Medications:    Current Outpatient Medications:     ACCU-CHEK SOFTCLIX LANCETS Misc, Inject 1 each into the skin 2 (two) times daily before meals., Disp: 100 each, Rfl: 6    allopurinoL (ZYLOPRIM) 100 MG tablet, TAKE 1 TABLET BY MOUTH EVERY DAY, Disp: 30 tablet, Rfl: 10    blood sugar diagnostic (ACCU-CHEK ANTOENLLA PLUS TEST STRP) Strp, Inject 1 strip into the skin 2 (two) times daily before meals., Disp: 100 strip, Rfl: 11    blood-glucose meter kit, Checks blood sugars 1x/daily., Disp: 1 each, Rfl: 12    fenofibrate 160 MG Tab, TAKE 1 TABLET(160 MG) BY MOUTH EVERY DAY, Disp: 30 tablet, Rfl: 10    glimepiride (AMARYL) 2 MG tablet, TAKE 2 TABLETS BY MOUTH EVERY MORNING, Disp: 60 tablet, Rfl: 5    lisinopriL (PRINIVIL,ZESTRIL) 20 MG tablet, Take 1 tablet (20 mg total) by mouth once daily., Disp: 90 tablet, Rfl: 3    lisinopriL-hydrochlorothiazide (PRINZIDE,ZESTORETIC) 20-25 mg Tab, TAKE 1 TABLET BY MOUTH EVERY DAY, Disp: 90 tablet, Rfl: 1    metFORMIN (GLUCOPHAGE) 500 MG tablet, TAKE 2 TABLETS BY MOUTH EVERY MORNING AND 2 TABLETS WITH DINNER, Disp: 120 tablet, Rfl: 10    nitroGLYCERIN (NITROSTAT) 0.4 MG SL tablet, Place 1 tablet (0.4 mg total) under the tongue every 5 (five) minutes as needed for Chest pain. If you need a third tablet, call 911, Disp: 60 tablet, Rfl: 12    potassium citrate (UROCIT-K) 10 mEq (1,080 mg) TbSR, TAKE 1 TABLET BY MOUTH TWICE DAILY, Disp: 60 tablet, Rfl: 9    rosuvastatin (CRESTOR) 20 MG tablet, TAKE 1 TABLET(20 MG) BY MOUTH EVERY DAY, Disp: 30 tablet, Rfl: 11    PHYSICAL EXAMINATION:    The patient generally appears in good health, is appropriately interactive, and is in no apparent distress.     Eyes: anicteric sclerae, moist conjunctivae; no lid-lag; PERRLA     HENT: Atraumatic; oropharynx clear with moist mucous membranes and no mucosal ulcerations;normal hard and soft palate.  No  evidence of lymphadenopathy.    Neck: Trachea midline.  No thyromegaly.    Skin: No lesions.    Mental: Cooperative with normal affect.  Is oriented to time, place, and person.    Neuro: Grossly intact.    Chest: Normal inspiratory effort.   No accessory muscles.  No audible wheezes.  Respirations symmetric on inspiration and expiration.    Heart: Regular rhythm.      Abdomen:  Soft, non-tender. No masses or organomegaly. Bladder is not palpable. No evidence of flank discomfort. No evidence of inguinal hernia.    Genitourinary: The penis has no evidence of plaques or induration. The urethral meatus is normal. The testes, epididymides, and cord structures are normal in size and contour bilaterally. The scrotum is normal in size and contour.    Normal anal sphincter tone. No rectal mass.    The prostate is 35 g. Normal landmarks. Lateral sulci. Median furrow intact.  No nodularity or induration. Seminal vesicles are normal.    Extremities: No clubbing, cyanosis, or edema      LABS:      Lab Results   Component Value Date    PSA 1.2 10/06/2021    PSA 0.60 10/17/2020       IMPRESSION:    Encounter Diagnoses   Name Primary?    Male hypogonadism Yes    Erectile dysfunction due to arterial insufficiency     BPH with urinary obstruction          PLAN:    1. Discussed the risks and benefits of testosterone replacement today.  This included possible cardiac risks.  He would like to try this.  Will check a T in 2 weeks and adjust the dose of TRT if necessary.  He can then RTC 3 months with T, PSA, CBC, hepatic panel, lipid panel.  A new Rx for Androgel 1.62% was given today.    2. Discussed options for his ED (affected by above comorbidities)..  He'd like to discuss ICI with his wife.  3. Will observe his LUTS as they don't bother him.      Copy to:

## 2022-01-06 NOTE — TELEPHONE ENCOUNTER
Called patient to inform of normal CT scan results with no further intervention needed or surgery.

## 2022-01-12 ENCOUNTER — CLINICAL SUPPORT (OUTPATIENT)
Dept: REHABILITATION | Facility: HOSPITAL | Age: 67
End: 2022-01-12
Attending: INTERNAL MEDICINE
Payer: MEDICARE

## 2022-01-12 DIAGNOSIS — M25.512 CHRONIC LEFT SHOULDER PAIN: ICD-10-CM

## 2022-01-12 DIAGNOSIS — G89.29 CHRONIC LEFT SHOULDER PAIN: ICD-10-CM

## 2022-01-12 PROCEDURE — 97110 THERAPEUTIC EXERCISES: CPT | Mod: PO,CQ

## 2022-01-12 PROCEDURE — 97140 MANUAL THERAPY 1/> REGIONS: CPT | Mod: PO,CQ

## 2022-01-12 NOTE — PROGRESS NOTES
"  Physical Therapy Daily Treatment Note     Name: Lukasz Person  Clinic Number: [id number]    Therapy Diagnosis:   Encounter Diagnosis   Name Primary?    Chronic left shoulder pain      Physician: Kirk Wilson MD    Visit Date: 1/12/2022    Physician Orders: PT Eval and Treat   Medical Diagnosis from Referral: M25.512,G89.29 (ICD-10-CM) - Chronic left shoulder pain  Evaluation Date: 11/16/2021  Authorization Period Expiration: 12/31/2022  Plan of Care Expiration: 2/16/2022  Progress Note Due: 1/27/2022  Visit # / Visits authorized: 2/20   FOTO: 1/ 3      Time In:2:15 pm  Time Out: 3:05 pm  Total Billable Time: 50 minutes    Precautions: Standard, Diabetes and Kidney Disease, HTN, CAD     Subjective     Pt reports:he is able to do more with his arm, but the one limitation he still has is reaching across his body.   He was compliant with home exercise program.  Response to previous treatment: Improved shoulder pain, albeit intermittent pain continues.  Functional change: Ongoing  Pain: 3/10  Location: Left shoulder      Objective       Lukasz received therapeutic exercises to develop strength, endurance and ROM for 35 minutes including:    +Rotator-Interval Stretch, 2 minutes, 4# cuff weight  +Scapular Retractions, prone, 20x5" hold  +Crossbody stretch on the wall, 15x 10" holds  +E.R. Wall Stretch, at 25, 45 and 90 degrees GH Flexion, 1x30" each height  +I.R Stretch, band, 5x10" hold   +ER and IR walkouts with GTB, 2 x 10 each  +Open Books, supine, 10x each side  SL ER, + 1# cuff weight 2x10  Serratus Wall Slides, 2x12, left   B shoulder ER with OTB, 2 x 10  Thoracic Extension, seated, 15x5" hold  Supine serratus punches 2x10    Lukasz received the following manual therapy techniques: Joint mobilizations were applied to the: Left shoulder for 15 minutes, including:  +Grade IV Thoracic CPA Mobilizations, T1-T9 --> f/b Grade V Mobilizations of Thoracic Spine  Posterior GH Joint Mobs, grades IV, 3x 1 minute sustained " "hold  Inferior GH Joint Mobs + End-Range Flexion, Grade IV, 3x30" hold  ER and IR gentle Resisted ROM, MET 5x5" hold + 20" hold   Crossover MET to improve horizontal adduction, 5x 5" holds  GH Flexion MET (resisted extension in supine), 5x5" hold, 20" hold  Supine Rhythmic Stabilization, scapular plane + Serratus Punch ,5 x 30"    Home Exercises Provided and Patient Education Provided     Education provided:   - Home exercise program  - Role of PT  - Findings of evaluation and plan of care   - PT/PTA and Teams System     Written Home Exercises Provided: Patient instructed to cont prior HEP.  Exercises were reviewed and Lukasz was able to demonstrate them prior to the end of the session.  Lukasz demonstrated good  understanding of the education provided.     See EMR under Patient Instructions for exercises provided 11/16/2021.    Assessment     Patient presented to treatment with minimal pain in his left shoulder. He continues to be limited with left shoulder horizontal adduction. L GH joint mobility continues to be limited, especially in the posterior direction. Continue to progress patient with strength and mobility.     Lukasz is progressing well towards his goals.   Pt prognosis is Good.     Pt will continue to benefit from skilled outpatient physical therapy to address the deficits listed in the problem list box on initial evaluation, provide pt/family education and to maximize pt's level of independence in the home and community environment.      Pt's spiritual, cultural and educational needs considered and pt agreeable to plan of care and goals.     Anticipated barriers to physical therapy: scheduling    Goals:  Short Term Goals: 4 weeks   1.) Patient will demonstrate independence in compliance and technique of home exercise program provided as per teach-back method of assessment. Ongoing  2.) Patient to improve passive internal rotation by +15 degrees to improve ability to reach behind back. Ongoing  3.) " Patient to reach backward to belt line with ease and fluidity of motion without pain >1/10. Ongoing  4.) Patient to improve FOTO score to <15% disability Ongoing  5.) Patient to improve DASH score to <5 points to demonstrate improvement in ability to complete ADL's. Ongoing        Long Term Goals: 6 weeks   1.) Patient will demonstrate independence in compliance and technique of home exercise program provided as per teach-back method of assessment.  2.) Patient to improve passive internal rotation to that which is symmetrical to unaffected UE to improve ability to reach behind back.  3.) Patient to reach backward to ~T12 with ease and fluidity of motion without pain >1/10.  4.) Patient to improve FOTO score to <5% disability  5.) Patient to improve DASH score to <5 points to demonstrate improvement in ability to complete ADL's.    Plan     Plan of care Certification: 11/16/2021 to 2/16/2021.     Outpatient Physical Therapy 2 times weekly for 8 weeks to include the following interventions: Manual Therapy, Moist Heat/ Ice, Neuromuscular Re-ed, Patient Education, Self Care, Therapeutic Activites and Therapeutic Exercise.     Continue as per current plan of care for additional 3-4 weeks with assessment for progress and carry-over between sessions with referral for additional assessment if minimal progress has been made at this time.    Gill Talley, PTA

## 2022-01-17 ENCOUNTER — PATIENT MESSAGE (OUTPATIENT)
Dept: UROLOGY | Facility: CLINIC | Age: 67
End: 2022-01-17
Payer: MEDICARE

## 2022-01-18 ENCOUNTER — PATIENT MESSAGE (OUTPATIENT)
Dept: OTOLARYNGOLOGY | Facility: CLINIC | Age: 67
End: 2022-01-18
Payer: MEDICARE

## 2022-01-18 ENCOUNTER — CLINICAL SUPPORT (OUTPATIENT)
Dept: REHABILITATION | Facility: HOSPITAL | Age: 67
End: 2022-01-18
Attending: INTERNAL MEDICINE
Payer: MEDICARE

## 2022-01-18 DIAGNOSIS — G89.29 CHRONIC LEFT SHOULDER PAIN: ICD-10-CM

## 2022-01-18 DIAGNOSIS — M25.512 CHRONIC LEFT SHOULDER PAIN: ICD-10-CM

## 2022-01-18 PROCEDURE — 97110 THERAPEUTIC EXERCISES: CPT | Mod: PO,CQ

## 2022-01-18 PROCEDURE — 97140 MANUAL THERAPY 1/> REGIONS: CPT | Mod: PO,CQ

## 2022-01-18 NOTE — PROGRESS NOTES
"  Physical Therapy Daily Treatment Note     Name: Lukasz Person  Clinic Number: [id number]    Therapy Diagnosis:   Encounter Diagnosis   Name Primary?    Chronic left shoulder pain      Physician: Kirk Wilson MD    Visit Date: 1/18/2022    Physician Orders: PT Eval and Treat   Medical Diagnosis from Referral: M25.512,G89.29 (ICD-10-CM) - Chronic left shoulder pain  Evaluation Date: 11/16/2021  Authorization Period Expiration: 12/31/2022  Plan of Care Expiration: 2/16/2022  Progress Note Due: 1/27/2022  Visit # / Visits authorized: 3/20   FOTO: 1/ 3      Time In:2:17 pm (late arrival)  Time Out: 2:45 pm  Total Billable Time: 28 minutes    Precautions: Standard, Diabetes and Kidney Disease, HTN, CAD     Subjective     Pt reports:he's not really having much pain, just soreness when he reaches across his body  He was compliant with home exercise program.  Response to previous treatment: Improved shoulder pain, albeit intermittent pain continues.  Functional change: Ongoing  Pain: 2/10  Location: Left shoulder      Objective       Lukasz received therapeutic exercises to develop strength, endurance and ROM for 18 minutes including:    Rotator-Interval Stretch, 2 minutes, 4# cuff weight  +Scapular Retractions, prone, 20x5" hold  Crossbody stretch on the wall, 15x 10" holds  +E.R. Wall Stretch, at 25, 45 and 90 degrees GH Flexion, 1x30" each height  +I.R Stretch, band, 5x10" hold   ER and IR walkouts with GTB, 2 x 10 each  Open Books, supine, 10x each side  SL ER, + 1# cuff weight 2x10  Serratus Wall Slides, 2x12, left   B shoulder ER with OTB, 2 x 10  Thoracic Extension, seated, 15x5" hold (out of time)  Supine serratus punches 2x10    Lukasz received the following manual therapy techniques: Joint mobilizations were applied to the: Left shoulder for 10 minutes, including:  +Grade IV Thoracic CPA Mobilizations, T1-T9 --> f/b Grade V Mobilizations of Thoracic Spine  Posterior GH Joint Mobs, grades IV, 3x 1 minute sustained " "hold  Inferior GH Joint Mobs + End-Range Flexion, Grade IV, 3x30" hold  ER and IR gentle Resisted ROM, MET 5x5" hold + 20" hold   Crossover MET to improve horizontal adduction, 5x 5" holds  GH Flexion MET (resisted extension in supine), 5x5" hold, 20" hold  Supine Rhythmic Stabilization, scapular plane + Serratus Punch ,5 x 30"    Home Exercises Provided and Patient Education Provided     Education provided:   - Home exercise program  - Role of PT  - Findings of evaluation and plan of care   - PT/PTA and Teams System     Written Home Exercises Provided: Patient instructed to cont prior HEP.  Exercises were reviewed and Lukasz was able to demonstrate them prior to the end of the session.  Lukasz demonstrated good  understanding of the education provided.     See EMR under Patient Instructions for exercises provided 11/16/2021.    Assessment     Patient presents to treatment with minimal pain, but continued limitations and discomfort with left shoulder horizontal adduction. Limited progressions made this session due to late arrival. Patient was instructed to continue performing HEP daily. Continue to progress patient with strength and mobility.     Lukasz is progressing well towards his goals.   Pt prognosis is Good.     Pt will continue to benefit from skilled outpatient physical therapy to address the deficits listed in the problem list box on initial evaluation, provide pt/family education and to maximize pt's level of independence in the home and community environment.      Pt's spiritual, cultural and educational needs considered and pt agreeable to plan of care and goals.     Anticipated barriers to physical therapy: scheduling    Goals:  Short Term Goals: 4 weeks   1.) Patient will demonstrate independence in compliance and technique of home exercise program provided as per teach-back method of assessment. Ongoing  2.) Patient to improve passive internal rotation by +15 degrees to improve ability to reach " behind back. Ongoing  3.) Patient to reach backward to belt line with ease and fluidity of motion without pain >1/10. Ongoing  4.) Patient to improve FOTO score to <15% disability Ongoing  5.) Patient to improve DASH score to <5 points to demonstrate improvement in ability to complete ADL's. Ongoing        Long Term Goals: 6 weeks   1.) Patient will demonstrate independence in compliance and technique of home exercise program provided as per teach-back method of assessment.  2.) Patient to improve passive internal rotation to that which is symmetrical to unaffected UE to improve ability to reach behind back.  3.) Patient to reach backward to ~T12 with ease and fluidity of motion without pain >1/10.  4.) Patient to improve FOTO score to <5% disability  5.) Patient to improve DASH score to <5 points to demonstrate improvement in ability to complete ADL's.    Plan     Plan of care Certification: 11/16/2021 to 2/16/2021.     Outpatient Physical Therapy 2 times weekly for 8 weeks to include the following interventions: Manual Therapy, Moist Heat/ Ice, Neuromuscular Re-ed, Patient Education, Self Care, Therapeutic Activites and Therapeutic Exercise.     Continue as per current plan of care for additional 3-4 weeks with assessment for progress and carry-over between sessions with referral for additional assessment if minimal progress has been made at this time.    Gill Talley, PTA

## 2022-01-20 ENCOUNTER — CLINICAL SUPPORT (OUTPATIENT)
Dept: REHABILITATION | Facility: HOSPITAL | Age: 67
End: 2022-01-20
Attending: INTERNAL MEDICINE
Payer: MEDICARE

## 2022-01-20 ENCOUNTER — TELEPHONE (OUTPATIENT)
Dept: UROLOGY | Facility: CLINIC | Age: 67
End: 2022-01-20
Payer: MEDICARE

## 2022-01-20 ENCOUNTER — LAB VISIT (OUTPATIENT)
Dept: LAB | Facility: HOSPITAL | Age: 67
End: 2022-01-20
Attending: UROLOGY
Payer: MEDICARE

## 2022-01-20 DIAGNOSIS — E29.1 MALE HYPOGONADISM: ICD-10-CM

## 2022-01-20 DIAGNOSIS — G89.29 CHRONIC LEFT SHOULDER PAIN: ICD-10-CM

## 2022-01-20 DIAGNOSIS — M25.512 CHRONIC LEFT SHOULDER PAIN: ICD-10-CM

## 2022-01-20 LAB — TESTOST SERPL-MCNC: 413 NG/DL (ref 304–1227)

## 2022-01-20 PROCEDURE — 84403 ASSAY OF TOTAL TESTOSTERONE: CPT | Performed by: UROLOGY

## 2022-01-20 PROCEDURE — 97140 MANUAL THERAPY 1/> REGIONS: CPT | Mod: PO

## 2022-01-20 PROCEDURE — 97110 THERAPEUTIC EXERCISES: CPT | Mod: PO

## 2022-01-20 PROCEDURE — 36415 COLL VENOUS BLD VENIPUNCTURE: CPT | Mod: PO | Performed by: UROLOGY

## 2022-01-20 NOTE — PROGRESS NOTES
"  Physical Therapy Daily Treatment Note     Name: Lukasz Person  Clinic Number: [id number]    Therapy Diagnosis:   Encounter Diagnosis   Name Primary?    Chronic left shoulder pain      Physician: Kirk Wilson MD    Visit Date: 1/20/2022    Physician Orders: PT Eval and Treat   Medical Diagnosis from Referral: M25.512,G89.29 (ICD-10-CM) - Chronic left shoulder pain  Evaluation Date: 11/16/2021  Authorization Period Expiration: 12/31/2022  Plan of Care Expiration: 2/16/2022  Progress Note Due: 1/27/2022  Visit # / Visits authorized: 10/20   FOTO: 1/ 3   Time In: 2:50  Time Out: 3:30 pm  Total Billable Time: 40 minutes    Precautions: Standard, Diabetes and Kidney Disease, HTN, CAD   Subjective     Pt reports: both of his shoulder started hurting last night without any particular reason to explain the pain.  He was compliant with home exercise program.  Response to previous treatment: Improved shoulder pain, albeit intermittent pain continues.  Functional change: Ongoing  Pain: 2/10  Location: Left shoulder      Objective   Increased pain with horizontal adduction both passive and active.    Lukasz received therapeutic exercises to develop strength, endurance and ROM for 15 minutes including:    Scapular Rows, green band, 3x10  Open Books, supine, 15x each side, 3" hold  E.R. with orange band, 3x10  I.R. with green band, 3x10    Lukasz received the following manual therapy techniques: Joint mobilizations were applied to the: Left shoulder for 25 minutes, including:  +Grade IV Thoracic CPA Mobilizations, T1-T9 --> f/b Grade V Mobilizations of Thoracic Spine  Posterior GH Joint Mobs, grades IV, 3x 1 minute sustained hold  Posterior GH Joint Mobs + GH I.R. , Grade III, 3x 1 minute sustained hold  Inferior GH Joint Mobs + End-Range Flexion, Grade IV, 3x30" hold  ER and IR gentle Resisted ROM, MET 5x5" hold + 20" hold     Home Exercises Provided and Patient Education Provided     Education provided:   - Home exercise " program  - Role of PT  - Findings of evaluation and plan of care   - PT/PTA and Teams System     Written Home Exercises Provided: Patient instructed to cont prior HEP.  Exercises were reviewed and Lukasz was able to demonstrate them prior to the end of the session.  Lukasz demonstrated good  understanding of the education provided.     See EMR under Patient Instructions for exercises provided 11/16/2021.    Assessment   Lukasz continues to demonstrate restriction and capsular end-feel in both external rotation and internal rotation with pain at the end-range of both directions, as well as increased pain with cross-body horizontal adduction that does respond well to manual intervention, primarily GH active A//P mobilizations with slight internal rotation of the GH joint. He also demonstrates restriction in thoracic extension mobility and passive abduction. We discussed the potential benefit of further assessment or consultation with orthopedic MD if symptoms do not continue to improve.    Lukasz is progressing well towards his goals.   Pt prognosis is Good.     Pt will continue to benefit from skilled outpatient physical therapy to address the deficits listed in the problem list box on initial evaluation, provide pt/family education and to maximize pt's level of independence in the home and community environment.      Pt's spiritual, cultural and educational needs considered and pt agreeable to plan of care and goals.     Anticipated barriers to physical therapy: scheduling    Goals:  Short Term Goals: 4 weeks   1.) Patient will demonstrate independence in compliance and technique of home exercise program provided as per teach-back method of assessment. Ongoing  2.) Patient to improve passive internal rotation by +15 degrees to improve ability to reach behind back. Ongoing  3.) Patient to reach backward to belt line with ease and fluidity of motion without pain >1/10. Ongoing  4.) Patient to improve FOTO score to  <15% disability Ongoing  5.) Patient to improve DASH score to <5 points to demonstrate improvement in ability to complete ADL's. Ongoing        Long Term Goals: 6 weeks   1.) Patient will demonstrate independence in compliance and technique of home exercise program provided as per teach-back method of assessment.  2.) Patient to improve passive internal rotation to that which is symmetrical to unaffected UE to improve ability to reach behind back.  3.) Patient to reach backward to ~T12 with ease and fluidity of motion without pain >1/10.  4.) Patient to improve FOTO score to <5% disability  5.) Patient to improve DASH score to <5 points to demonstrate improvement in ability to complete ADL's.    Plan     Plan of care Certification: 11/16/2021 to 2/16/2021.     Outpatient Physical Therapy 2 times weekly for 8 weeks to include the following interventions: Manual Therapy, Moist Heat/ Ice, Neuromuscular Re-ed, Patient Education, Self Care, Therapeutic Activites and Therapeutic Exercise.     Continue as per current plan of care for additional 3-4 weeks with assessment for progress and carry-over between sessions with referral for additional assessment if minimal progress has been made at this time.    Jayla Taylor, PT, DPT, OCS

## 2022-01-21 ENCOUNTER — PATIENT MESSAGE (OUTPATIENT)
Dept: UROLOGY | Facility: CLINIC | Age: 67
End: 2022-01-21
Payer: MEDICARE

## 2022-01-21 NOTE — TELEPHONE ENCOUNTER
Call placed to patient. No answer. Message left to return call. Call back number provided. Will continue to follow-up.   Portal message sent.

## 2022-01-21 NOTE — TELEPHONE ENCOUNTER
Call placed to patient. Name and date of birth verified. Patient informed of the following:    Please notify T is normal.  Continue TRT at current dose.  -Dr. Álvarez    Patient verbalized that he misunderstood Dr. Álvarez's instructions, and has been applying a total of 4 pumps to shoulders daily. Patient educated on medication as it is a controlled substance. Patient informed Dr. Álvarez will be notified of above. Patient verbalized understanding and stated he will continue his current dose until Dr. Álvarez says other serrano.

## 2022-02-01 ENCOUNTER — PATIENT MESSAGE (OUTPATIENT)
Dept: UROLOGY | Facility: CLINIC | Age: 67
End: 2022-02-01
Payer: MEDICARE

## 2022-02-08 ENCOUNTER — OFFICE VISIT (OUTPATIENT)
Dept: OPTOMETRY | Facility: CLINIC | Age: 67
End: 2022-02-08
Payer: MEDICARE

## 2022-02-08 DIAGNOSIS — H52.03 HYPEROPIA WITH ASTIGMATISM AND PRESBYOPIA, BILATERAL: ICD-10-CM

## 2022-02-08 DIAGNOSIS — H52.203 HYPEROPIA WITH ASTIGMATISM AND PRESBYOPIA, BILATERAL: ICD-10-CM

## 2022-02-08 DIAGNOSIS — E11.65 UNCONTROLLED TYPE 2 DIABETES MELLITUS WITH HYPERGLYCEMIA: Chronic | ICD-10-CM

## 2022-02-08 DIAGNOSIS — N18.31 TYPE 2 DIABETES MELLITUS WITH STAGE 3A CHRONIC KIDNEY DISEASE, WITHOUT LONG-TERM CURRENT USE OF INSULIN: Chronic | ICD-10-CM

## 2022-02-08 DIAGNOSIS — E11.9 TYPE 2 DIABETES MELLITUS WITHOUT RETINOPATHY: Primary | ICD-10-CM

## 2022-02-08 DIAGNOSIS — H25.13 NUCLEAR SCLEROSIS OF BOTH EYES: ICD-10-CM

## 2022-02-08 DIAGNOSIS — E11.22 TYPE 2 DIABETES MELLITUS WITH STAGE 3A CHRONIC KIDNEY DISEASE, WITHOUT LONG-TERM CURRENT USE OF INSULIN: Chronic | ICD-10-CM

## 2022-02-08 DIAGNOSIS — H52.4 HYPEROPIA WITH ASTIGMATISM AND PRESBYOPIA, BILATERAL: ICD-10-CM

## 2022-02-08 DIAGNOSIS — E11.36 TYPE 2 DIABETES MELLITUS WITH CATARACT: ICD-10-CM

## 2022-02-08 PROCEDURE — 99213 OFFICE O/P EST LOW 20 MIN: CPT | Mod: PBBFAC | Performed by: OPTOMETRIST

## 2022-02-08 PROCEDURE — 92015 DETERMINE REFRACTIVE STATE: CPT | Mod: ,,, | Performed by: OPTOMETRIST

## 2022-02-08 PROCEDURE — 92015 PR REFRACTION: ICD-10-PCS | Mod: ,,, | Performed by: OPTOMETRIST

## 2022-02-08 PROCEDURE — 92014 COMPRE OPH EXAM EST PT 1/>: CPT | Mod: S$PBB,,, | Performed by: OPTOMETRIST

## 2022-02-08 PROCEDURE — 92014 PR EYE EXAM, EST PATIENT,COMPREHESV: ICD-10-PCS | Mod: S$PBB,,, | Performed by: OPTOMETRIST

## 2022-02-08 PROCEDURE — 99999 PR PBB SHADOW E&M-EST. PATIENT-LVL III: CPT | Mod: PBBFAC,,, | Performed by: OPTOMETRIST

## 2022-02-08 PROCEDURE — 99999 PR PBB SHADOW E&M-EST. PATIENT-LVL III: ICD-10-PCS | Mod: PBBFAC,,, | Performed by: OPTOMETRIST

## 2022-02-08 NOTE — PROGRESS NOTES
HPI     DLS:03/06/2020 Herman  Patient here annual diabetic eye exam.  Vision seem stable w/correction.     this morning  Hemoglobin A1C       Date                     Value               Ref Range             Status                10/06/2021               7.0 (H)             4.0 - 5.6 %           Final              Comment:    ADA Screening Guidelines:  5.7-6.4%  Consistent with   prediabetes  >or=6.5%  Consistent with diabetes    High levels of fetal   hemoglobin interfere with the HbA1C  assay. Heterozygous hemoglobin   variants (HbS, HgC, etc)do  not significantly interfere with this assay.     However, presence of multiple variants may affect accuracy.         05/31/2021               6.9 (H)             4.0 - 5.6 %           Final              Comment:    ADA Screening Guidelines:  5.7-6.4%  Consistent with   prediabetes  >or=6.5%  Consistent with diabetes    High levels of fetal   hemoglobin interfere with the HbA1C  assay. Heterozygous hemoglobin   variants (HbS, HgC, etc)do  not significantly interfere with this assay.     However, presence of multiple variants may affect accuracy.         04/29/2021               7.3 (H)             4.0 - 5.6 %           Final              Comment:    ADA Screening Guidelines:  5.7-6.4%  Consistent with   prediabetes  >or=6.5%  Consistent with diabetes    High levels of fetal   hemoglobin interfere with the HbA1C  assay. Heterozygous hemoglobin   variants (HbS, HgC, etc)do  not significantly interfere with this assay.     However, presence of multiple variants may affect accuracy.    ----------      Last edited by Johanny Sutton, OD on 2/8/2022  2:20 PM. (History)            Assessment /Plan     For exam results, see Encounter Report.    Type 2 diabetes mellitus without retinopathy    Type 2 diabetes mellitus with stage 3a chronic kidney disease, without long-term current use of insulin  -     Ambulatory referral/consult to Optometry    Uncontrolled type 2  diabetes mellitus with hyperglycemia  -     Ambulatory referral/consult to Optometry    Nuclear sclerosis of both eyes    Type 2 diabetes mellitus with cataract    Hyperopia with astigmatism and presbyopia, bilateral      1-3. BS control. No signs of diabetic retinopathy. Monitor with annual exam.  4-5. Nuclear sclerotic cataract - not visually significant. Observe.  6. SRx released to patient. Patient educated on lens options. Normal ocular health. RTC 1 year for routine exam.

## 2022-02-14 ENCOUNTER — PATIENT OUTREACH (OUTPATIENT)
Dept: ADMINISTRATIVE | Facility: OTHER | Age: 67
End: 2022-02-14
Payer: MEDICARE

## 2022-02-14 NOTE — PROGRESS NOTES
LINKS immunization registry updated  Care Everywhere updated  Health Maintenance updated  Chart reviewed for overdue [facility]active [facility] ([facility]) health maintenance testing (CRS, Breast Ca, Diabetic Eye Exam)   Orders entered:N/A

## 2022-02-15 ENCOUNTER — HOSPITAL ENCOUNTER (OUTPATIENT)
Dept: RADIOLOGY | Facility: HOSPITAL | Age: 67
Discharge: HOME | End: 2022-02-15
Attending: NURSE PRACTITIONER
Payer: MEDICARE

## 2022-02-15 ENCOUNTER — OFFICE VISIT (OUTPATIENT)
Dept: UROLOGY | Facility: CLINIC | Age: 67
End: 2022-02-15
Payer: MEDICARE

## 2022-02-15 VITALS
WEIGHT: 255 LBS | DIASTOLIC BLOOD PRESSURE: 90 MMHG | BODY MASS INDEX: 34.54 KG/M2 | HEIGHT: 72 IN | HEART RATE: 59 BPM | SYSTOLIC BLOOD PRESSURE: 175 MMHG

## 2022-02-15 DIAGNOSIS — N20.0 KIDNEY STONES: ICD-10-CM

## 2022-02-15 DIAGNOSIS — R10.9 LEFT FLANK PAIN: Primary | ICD-10-CM

## 2022-02-15 DIAGNOSIS — R10.9 LEFT FLANK PAIN: ICD-10-CM

## 2022-02-15 DIAGNOSIS — R11.0 NAUSEA: ICD-10-CM

## 2022-02-15 LAB
BILIRUB SERPL-MCNC: ABNORMAL MG/DL
BLOOD URINE, POC: ABNORMAL
CLARITY, POC UA: CLEAR
COLOR, POC UA: YELLOW
GLUCOSE UR QL STRIP: 250
KETONES UR QL STRIP: ABNORMAL
LEUKOCYTE ESTERASE URINE, POC: ABNORMAL
NITRITE, POC UA: ABNORMAL
PH, POC UA: 5
PROTEIN, POC: ABNORMAL
SPECIFIC GRAVITY, POC UA: 1.01
UROBILINOGEN, POC UA: ABNORMAL

## 2022-02-15 PROCEDURE — 76770 US EXAM ABDO BACK WALL COMP: CPT | Mod: TC

## 2022-02-15 PROCEDURE — 76770 US RETROPERITONEAL COMPLETE: ICD-10-PCS | Mod: 26,,, | Performed by: RADIOLOGY

## 2022-02-15 PROCEDURE — 99214 PR OFFICE/OUTPT VISIT, EST, LEVL IV, 30-39 MIN: ICD-10-PCS | Mod: S$PBB,,, | Performed by: NURSE PRACTITIONER

## 2022-02-15 PROCEDURE — 99999 PR PBB SHADOW E&M-EST. PATIENT-LVL V: ICD-10-PCS | Mod: PBBFAC,,, | Performed by: NURSE PRACTITIONER

## 2022-02-15 PROCEDURE — 81002 URINALYSIS NONAUTO W/O SCOPE: CPT | Mod: PBBFAC | Performed by: NURSE PRACTITIONER

## 2022-02-15 PROCEDURE — 99215 OFFICE O/P EST HI 40 MIN: CPT | Mod: PBBFAC | Performed by: NURSE PRACTITIONER

## 2022-02-15 PROCEDURE — 76770 US EXAM ABDO BACK WALL COMP: CPT | Mod: 26,,, | Performed by: RADIOLOGY

## 2022-02-15 PROCEDURE — 99999 PR PBB SHADOW E&M-EST. PATIENT-LVL V: CPT | Mod: PBBFAC,,, | Performed by: NURSE PRACTITIONER

## 2022-02-15 PROCEDURE — 99214 OFFICE O/P EST MOD 30 MIN: CPT | Mod: S$PBB,,, | Performed by: NURSE PRACTITIONER

## 2022-02-15 RX ORDER — ONDANSETRON 4 MG/1
4 TABLET, FILM COATED ORAL EVERY 8 HOURS PRN
Qty: 16 TABLET | Refills: 1 | Status: SHIPPED | OUTPATIENT
Start: 2022-02-15 | End: 2022-02-22

## 2022-02-15 RX ORDER — TAMSULOSIN HYDROCHLORIDE 0.4 MG/1
0.4 CAPSULE ORAL DAILY
Qty: 30 CAPSULE | Refills: 2 | Status: SHIPPED | OUTPATIENT
Start: 2022-02-15 | End: 2022-05-16

## 2022-02-15 RX ORDER — KETOROLAC TROMETHAMINE 10 MG/1
10 TABLET, FILM COATED ORAL EVERY 6 HOURS PRN
Qty: 20 TABLET | Refills: 1 | Status: SHIPPED | OUTPATIENT
Start: 2022-02-15 | End: 2022-03-07

## 2022-02-15 NOTE — PATIENT INSTRUCTIONS
Patient Education       Flank Pain   About this topic   Flank pain may be caused by problems with your kidneys like infection, stones or other blockages. Muscle or back problems or an injury may cause this kind of pain. Shingles and certain types of cancer can also cause flank pain. Gallbladder, appendix, liver, pancreas, and intestine problems may cause flank pain as well. Sometimes the ribs or lungs can cause flank pain too.  What are the causes?   Flank pain may be caused by problems with your kidneys like infection or stones. Muscle or back problems or an injury may cause this kind of pain. Shingles and certain types of cancer can also cause flank pain. Gallbladder, liver, and intestine problems may cause flank pain as well.  What are the main signs?   Mild or very bad pain between the upper belly and the middle of the back  How does the doctor diagnose this health problem?   · Your doctor will take your history. Talk to your doctor about:  ? All the drugs you are taking. Be sure to include all prescription and over-the-counter (OTC) drugs, and herbal supplements. Tell the doctor about any drug allergy. Bring a list of drugs you take with you.  ? Any bleeding problems. Be sure to tell your doctor if you are taking any drugs that may cause bleeding. Some of these are warfarin, rivaroxaban, apixaban, ticagrelor, clopidogrel, ketorolac, ibuprofen, naproxen, or aspirin. Certain vitamins and herbs, such as garlic and fish oil, may also add to the risk for bleeding. You may need to stop these drugs as well. Talk to your doctor about them.  ? Any prior kidney problems you have had.  · Your doctor will do an exam and may order:  ? Lab tests  ? X-rays  ? CT scan  ? Ultrasound  ? Nuclear medicine tests  How does the doctor treat this health problem?   · Mild pain that only lasts a short while may go away on its own. Pain that is very bad with other signs, such as fever or bleeding, will need treated. Treatment is based on  what is causing the pain.  · Radiation or chemo may be needed if cancer or tumors are the cause.  · Surgery may be needed if kidney stones, gallbladder problem, or cancer is the cause.  What drugs may be needed?   The doctor may order drugs to:  · Help with pain and swelling  · Relax muscles  · Fight an infection  What problems could happen?   · Infection  · Bleeding  · Kidney failure  What can be done to prevent this health problem?   Some causes of flank pain can be stopped or lowered by doing some easy steps:  · Warm up and stretch before starting a workout.  · Use your leg muscles and knees to protect your back when lifting.  · Drink 6 to 8 glasses of water each day. This will help to keep from getting kidney stones and infections.  · Drink less beer, wine, and mixed drinks (alcohol) to lower your chance of other diseases.  · Talk to your doctor about getting the shingles vaccine.  Last Reviewed Date   2020-10-28  Consumer Information Use and Disclaimer   This information is not specific medical advice and does not replace information you receive from your health care provider. This is only a brief summary of general information. It does NOT include all information about conditions, illnesses, injuries, tests, procedures, treatments, therapies, discharge instructions or life-style choices that may apply to you. You must talk with your health care provider for complete information about your health and treatment options. This information should not be used to decide whether or not to accept your health care providers advice, instructions or recommendations. Only your health care provider has the knowledge and training to provide advice that is right for you.  Copyright   Copyright © 2021 UpToDate, Inc. and its affiliates and/or licensors. All rights reserved.  Patient Education       Kidney Stones in Adults   The Basics   Written by the doctors and editors at [employer]   What are kidney stones? -- Kidney stones  "are just what they sound like: small stones that form inside the kidneys. They form when salts and minerals that are normally in the urine build up and harden.  Kidney stones usually get carried out of the body when you urinate. But sometimes they can get stuck on the way out (figure 1). If that happens, the stones can cause:  · Pain in your side or in the lower part of your belly  · Blood in the urine (which can make urine pink or red)  · Nausea or vomiting  · Pain when you urinate  · The need to urinate in a hurry  How do I know if I have kidney stones? -- If your doctor or nurse thinks you have kidney stones, they can order an imaging test that can show the stones.  How are kidney stones treated? -- Each person's treatment is a little different. The right treatment for you will depend on:  · The size, type, and location of your stone  · How much pain you have  · How much you are vomiting  If your stone is big or causes severe symptoms, you might need to stay in the hospital. If your stone is small and causes only mild symptoms, you might be able to stay home and wait for it to pass in the urine. If you stay home, you will probably need to drink a lot of fluids. Plus, you might need to take pain medicines or medicines that make it easier to pass the stone.  Stones that do not pass on their own can be treated with:  · A machine that uses sound waves to break up stones into smaller pieces. This is called "shock wave lithotripsy." This procedure does not involve surgery, but it can be painful.  · A special kind of surgery that makes very small holes in your skin. During this surgery, the doctor passes tiny tools through the holes and into the kidney. Then they remove the stone. This is called "percutaneous nephrolithotomy."  · A thin tube that goes into your body the same way urine comes out. Doctors use tools at the end of the tube to break up or remove stones. This is called "ureteroscopy."  What can I do to keep " from getting kidney stones again? -- One simple thing you can do is to drink plenty of water. You might also need to change what you eat, depending on what your kidney stones were made of. If so, your doctor or nurse can tell you which foods to avoid. Your doctor or nurse might also prescribe you new medicines to keep you from having another kidney stone.  All topics are updated as new evidence becomes available and our peer review process is complete.  This topic retrieved from [employer] on: Sep 21, 2021.  Topic 38710 Version 11.0  Release: 29.4.2 - C29.263  © 2021 UpToDate, Inc. and/or its affiliates. All rights reserved.  figure 1: Anatomy of the urinary tract     Urine is made by the kidneys. It passes from the kidneys into the bladder through two tubes called the ureters. Then it leaves the bladder through another tube called the urethra.  Graphic 33013 Version 7.0    Consumer Information Use and Disclaimer   This information is not specific medical advice and does not replace information you receive from your health care provider. This is only a brief summary of general information. It does NOT include all information about conditions, illnesses, injuries, tests, procedures, treatments, therapies, discharge instructions or life-style choices that may apply to you. You must talk with your health care provider for complete information about your health and treatment options. This information should not be used to decide whether or not to accept your health care provider's advice, instructions or recommendations. Only your health care provider has the knowledge and training to provide advice that is right for you. The use of this information is governed by the [employer] End User License Agreement, available at https://www.[employer]/en/solutions/[employer]/about/jerry.The use of [employer] content is governed by the [employer] Terms of Use. ©2021 UpToDate, Inc. All rights reserved.  Copyright   © 2021 [employer]  [employer] and/or its affiliates. All rights reserved.  Patient Education       Kidney Stone Diet   About this topic   A kidney stone is a hard mass that is made up of tiny crystals in the kidneys. They get stuck and cause pain as they try to leave the kidney. There are different kinds of kidney stones. The most common ones are made of calcium and oxalate. What you eat and drink can cause kidney stones to form or grow.  What will the results be?   When you make changes to your diet, you may help prevent kidney stones.  What drugs may be needed?   Talk to your doctor about what drugs you take. You may need to start or stop taking a drug, vitamin, or supplement based on your needs.  What changes to diet are needed?   The kind of kidney stone you have will guide what foods you should eat or avoid. Talk to your doctor or dietitian about your diet based on what kind of kidney stone you had. They may suggest you:  · Drink more fluids. Then you will make more urine. Drink at least 12 cups of fluids (3 liters) each day. Drink more if it is hot or you exercise.  · Eat less salt. You get most of the salt or sodium in your diet from processed or prepared foods. Choose no salt added or low-salt foods. Foods you may want to limit or avoid are: Cured meats like hot dogs, bratwurst, or perry; pickles, olives, canned pasta sauce or soup, some salad dressings, some frozen meals, and table salt.  · Eat less animal protein. This includes meat, poultry, seafood, and eggs. Talk to your doctor or dietitian about how much meat or protein you need each day.  · Get the right amount of calcium in your diet. Too much or too little calcium can cause kidney stones to form. Talk to your doctor or dietitian about how much calcium you need each day. Calcium is in dairy products such as milk, cheese, and yogurt. You can also get calcium from cooked broccoli, beans, dried figs, and tofu.  · Eat less foods that are high in oxalate. Foods high in  oxalate include spinach, rhubarb, beets, bran flakes, potatoes, and nuts or nut butters.  · Eat less foods that are high in purine. Foods high in purines include organ meat, perry, anchovies, sardines, and herring. You should also limit alcohol.  · Eat less foods and drinks sweetened with high fructose corn syrup.  · Get at least 5 servings of fruits and vegetables a day.  · Get the right amount of magnesium. Ask your doctor or dietitian how much magnesium you need each day. Foods high in magnesium include almonds, cashews, peanuts, peanut butter, black beans, kidney beans, oatmeal, brown rice, whole wheat bread, cooked spinach, baked potato with the skin, and edamame. You may need a magnesium supplement if you dont get enough in your diet.  · Avoid high doses of Vitamin C. High intake of Vitamin C may produce more oxalate.  Helpful tips   · Replace some of the animal protein in your diet with plant-based foods. This includes beans; dried peas; lentils; nuts; seeds; and soy products like tofu. These foods are high in protein.  · Learn to read the food label. The food label tells you what is in the food you eat.  Where can I learn more?   [employer] Kid[employer] Foundation  https://www.kidney.org/atoz/content/kidneystones_prevent   Last Reviewed Date   2021-10-08  Consumer Information Use and Disclaimer   This information is not specific medical advice and does not replace information you receive from your health care provider. This is only a brief summary of general information. It does NOT include all information about conditions, illnesses, injuries, tests, procedures, treatments, therapies, discharge instructions or life-style choices that may apply to you. You must talk with your health care provider for complete information about your health and treatment options. This information should not be used to decide whether or not to accept your health care providers advice, instructions or recommendations. Only your health  care provider has the knowledge and training to provide advice that is right for you.  Copyright   Copyright © 2021 [employer] and its affiliates and/or licensors. All rights reserved.

## 2022-02-15 NOTE — PROGRESS NOTES
CHIEF COMPLAINT:    Lukasz Person is a 66 y.o. male presents today for Left Flank Pain.     HISTORY OF PRESENTING ILLINESS:    Lukasz Person is a 66 y.o. male who has a history of Hypogonadism.    Has been on TRT in the past and has more energy with TRT.      He is currently being managed by Dr. Álvarez on AndroGel 1.62% 2 pumps daily.  He was last seen in clinic 01/06/2022 with Dr. Álvarez. This is a new patient to for me. I personally reviewed their recent medical records as well as their outside medical, surgical, family, & social history.     He is here today due to left flank pain. He started with back pain that radiated to abdomen. Monday worsened; now just on the left.   No fever, some nausea.  Drinking more water.  Reports urinating more often but less volume.  No dysuria or gross hematuria.  He took left over dilaudid for pain.     He reports a history of kidney stones for years while he was living in [place].  He relocated here ~ 3 years ago. Has not had any problems.    Normally he would pass them;  He states about 6 years ago, he was unable to pass one and required ESWL.  He had history elevated PTH, required surgery;             REVIEW OF SYSTEMS:  Review of Systems   Constitutional: Negative.  Negative for chills and fever.   Eyes: Negative for double vision.   Respiratory: Negative for cough and shortness of breath.    Cardiovascular: Negative for chest pain and palpitations.   Gastrointestinal: Positive for nausea. Negative for vomiting.   Genitourinary: Positive for flank pain, frequency and urgency. Negative for dysuria and hematuria.   Neurological: Negative for dizziness.         PATIENT HISTORY:    Past Medical History:   Diagnosis Date    Diabetes mellitus     Onset late 50s/early 60s    Hyperlipidemia     Hypertension     Onset late 50s/early 60s    Sleep apnea     since 2006       Past Surgical History:   Procedure Laterality Date    CORONARY ANGIOGRAPHY N/A 9/30/2020    Procedure:  ANGIOGRAM, CORONARY ARTERY;  Surgeon: John West MD;  Location: [facility] CATH LAB;  Service: Cardiology;  Laterality: N/A;    LEFT HEART CATHETERIZATION Left 2020    Procedure: Left heart cath;  Surgeon: John West MD;  Location: [facility] CATH LAB;  Service: Cardiology;  Laterality: Left;    LITHOTRIPSY      PARATHYROIDECTOMY  -107       Family History   Problem Relation Age of Onset    Heart disease Father 70        CABG    Arthritis Father     Diabetes Father     Kidney disease Father         had one kidney removed in early thirties    Arthritis Mother     Colon cancer Brother 32    Diabetes Paternal Grandmother     Colon polyps Paternal Grandfather     Colon cancer Paternal Grandfather     Cancer Paternal Grandfather         colon cancer at age 62    Alcohol abuse Paternal Aunt     Arthritis Sister     Arthritis Sister     Arthritis Brother     Cancer Brother         colon cancer at age 32    Cancer Maternal Grandfather         throat cancer    Hypertension Sister        Social History     Socioeconomic History    Marital status:    Tobacco Use    Smoking status: Former Smoker     Packs/day: 1.00     Years: 7.00     Pack years: 7.00     Types: Cigarettes, Cigars     Start date: 1972     Quit date:      Years since quittin.7    Smokeless tobacco: Never Used    Tobacco comment: smoking was off and on.  cumulative 7 years.  never more than three years in one stretch or more lj   Substance and Sexual Activity    Alcohol use: Yes     Alcohol/week: 3.0 standard drinks     Types: 2 Cans of beer, 1 Shots of liquor per week     Comment: don't drink regularly.  when I do, three beers or shots    Drug use: Not Currently     Types: Marijuana    Sexual activity: Not Currently     Partners: Female     Birth control/protection: None     Comment:      Social Determinants of Health     Financial Resource Strain: Low Risk     Difficulty of Paying Living  Expenses: Not very hard   Food Insecurity: No Food Insecurity    Worried About Running Out of Food in the Last Year: Never true    Ran Out of Food in the Last Year: Never true   Transportation Needs: No Transportation Needs    Lack of Transportation (Medical): No    Lack of Transportation (Non-Medical): No   Physical Activity: Insufficiently Active    Days of Exercise per Week: 4 days    Minutes of Exercise per Session: 30 min   Stress: No Stress Concern Present    Feeling of Stress : Only a little   Social Connections: Unknown    Frequency of Communication with Friends and Family: Once a week    Frequency of Social Gatherings with Friends and Family: Once a week    Active Member of Clubs or Organizations: No    Attends Club or Organization Meetings: Never    Marital Status:    Housing Stability: Low Risk     Unable to Pay for Housing in the Last Year: No    Number of Places Lived in the Last Year: 1    Unstable Housing in the Last Year: No       Allergies:  Patient has no known allergies.    Medications:    Current Outpatient Medications:     ACCU-CHEK SOFTCLIX LANCETS Misc, USE EVERY DAY, Disp: 100 each, Rfl: 6    allopurinoL (ZYLOPRIM) 100 MG tablet, TAKE 1 TABLET BY MOUTH EVERY DAY, Disp: 30 tablet, Rfl: 10    blood sugar diagnostic (ACCU-CHEK ANTONELLA PLUS TEST STRP) Strp, Inject 1 strip into the skin 2 (two) times daily before meals., Disp: 100 strip, Rfl: 11    blood-glucose meter kit, Checks blood sugars 1x/daily., Disp: 1 each, Rfl: 12    fenofibrate 160 MG Tab, TAKE 1 TABLET(160 MG) BY MOUTH EVERY DAY, Disp: 30 tablet, Rfl: 10    glimepiride (AMARYL) 2 MG tablet, TAKE 2 TABLETS BY MOUTH EVERY MORNING, Disp: 60 tablet, Rfl: 5    lisinopriL (PRINIVIL,ZESTRIL) 20 MG tablet, Take 1 tablet (20 mg total) by mouth once daily., Disp: 90 tablet, Rfl: 3    lisinopriL-hydrochlorothiazide (PRINZIDE,ZESTORETIC) 20-25 mg Tab, TAKE 1 TABLET BY MOUTH EVERY DAY, Disp: 90 tablet, Rfl: 1     metFORMIN (GLUCOPHAGE) 500 MG tablet, TAKE 2 TABLETS BY MOUTH EVERY MORNING AND 2 TABLETS WITH DINNER, Disp: 120 tablet, Rfl: 10    nitroGLYCERIN (NITROSTAT) 0.4 MG SL tablet, Place 1 tablet (0.4 mg total) under the tongue every 5 (five) minutes as needed for Chest pain. If you need a third tablet, call 911, Disp: 60 tablet, Rfl: 12    potassium citrate (UROCIT-K) 10 mEq (1,080 mg) TbSR, TAKE 1 TABLET BY MOUTH TWICE DAILY, Disp: 60 tablet, Rfl: 9    rosuvastatin (CRESTOR) 20 MG tablet, TAKE 1 TABLET(20 MG) BY MOUTH EVERY DAY, Disp: 30 tablet, Rfl: 11    testosterone (ANDROGEL) 20.25 mg/1.25 gram (1.62 %) GlPm, Apply 2 pumps to shoulders daily, Disp: 1 each, Rfl: 5    ketorolac (TORADOL) 10 mg tablet, Take 1 tablet (10 mg total) by mouth every 6 (six) hours as needed for Pain., Disp: 20 tablet, Rfl: 1    ondansetron (ZOFRAN) 4 MG tablet, Take 1 tablet (4 mg total) by mouth every 8 (eight) hours as needed for Nausea., Disp: 16 tablet, Rfl: 1    tamsulosin (FLOMAX) 0.4 mg Cap, Take 1 capsule (0.4 mg total) by mouth once daily., Disp: 30 capsule, Rfl: 2    PHYSICAL EXAMINATION:  Physical Exam  Vitals and nursing note reviewed.   Constitutional:       General: He is awake.      Appearance: Normal appearance.   HENT:      Head: Normocephalic.      Right Ear: External ear normal.      Left Ear: External ear normal.      Nose: Nose normal.   Cardiovascular:      Rate and Rhythm: Normal rate.   Pulmonary:      Effort: Pulmonary effort is normal. No respiratory distress.   Abdominal:      Tenderness: There is no abdominal tenderness. There is left CVA tenderness. There is no right CVA tenderness.   Musculoskeletal:         General: Normal range of motion.      Cervical back: Normal range of motion.   Skin:     General: Skin is warm and dry.   Neurological:      General: No focal deficit present.      Mental Status: He is alert and oriented to person, place, and time.   Psychiatric:         Mood and Affect: Mood normal.          Behavior: Behavior is cooperative.           LABS:      In office UA today was clear of active infection/gross hematuria.       Lab Results   Component Value Date    PSA 1.2 10/06/2021    PSA 0.60 10/17/2020             IMPRESSION:    Encounter Diagnoses   Name Primary?    Left flank pain Yes    Kidney stones     Nausea          Assessment:       1. Left flank pain    2. Kidney stones    3. Nausea        Plan:         I spent 30 minutes with the patient of which more than half was spent in direct consultation with the patient in regards to our treatment and plan.  We addressed the office findings and recent labs.   Education and recommendations of today's plan of care including home remedies and needed follow up with PCP.   We discussed the office findings.   Due to history & flank pain will get US today.   Get some labs and Rx meds for apparent renal colic.   Will start Flomax daily, Toradol for pain, & Zofran for nausea.  F/u visit based on his Imaging.

## 2022-02-16 ENCOUNTER — PATIENT MESSAGE (OUTPATIENT)
Dept: UROLOGY | Facility: CLINIC | Age: 67
End: 2022-02-16
Payer: MEDICARE

## 2022-02-16 ENCOUNTER — HOSPITAL ENCOUNTER (OUTPATIENT)
Dept: RADIOLOGY | Facility: HOSPITAL | Age: 67
Discharge: HOME | End: 2022-02-16
Attending: NURSE PRACTITIONER
Payer: MEDICARE

## 2022-02-16 ENCOUNTER — TELEPHONE (OUTPATIENT)
Dept: UROLOGY | Facility: CLINIC | Age: 67
End: 2022-02-16
Payer: MEDICARE

## 2022-02-16 DIAGNOSIS — N13.5 URETERAL OBSTRUCTION, LEFT: Primary | ICD-10-CM

## 2022-02-16 DIAGNOSIS — N20.0 KIDNEY STONE: ICD-10-CM

## 2022-02-16 DIAGNOSIS — R79.89 ELEVATED SERUM CREATININE: ICD-10-CM

## 2022-02-16 DIAGNOSIS — N13.30 HYDRONEPHROSIS OF LEFT KIDNEY: ICD-10-CM

## 2022-02-16 DIAGNOSIS — N13.5 URETERAL OBSTRUCTION, LEFT: ICD-10-CM

## 2022-02-16 PROCEDURE — 74176 CT RENAL STONE STUDY ABD PELVIS WO: ICD-10-PCS | Mod: 26,,, | Performed by: RADIOLOGY

## 2022-02-16 PROCEDURE — 74176 CT ABD & PELVIS W/O CONTRAST: CPT | Mod: TC

## 2022-02-16 PROCEDURE — 74176 CT ABD & PELVIS W/O CONTRAST: CPT | Mod: 26,,, | Performed by: RADIOLOGY

## 2022-02-17 ENCOUNTER — ANESTHESIA EVENT (OUTPATIENT)
Dept: SURGERY | Facility: HOSPITAL | Age: 67
End: 2022-02-17
Payer: MEDICARE

## 2022-02-17 ENCOUNTER — OFFICE VISIT (OUTPATIENT)
Dept: UROLOGY | Facility: CLINIC | Age: 67
End: 2022-02-17
Payer: MEDICARE

## 2022-02-17 ENCOUNTER — HOSPITAL ENCOUNTER (OUTPATIENT)
Facility: HOSPITAL | Age: 67
LOS: 1 days | Discharge: HOME | End: 2022-02-17
Attending: UROLOGY | Admitting: UROLOGY
Payer: MEDICARE

## 2022-02-17 ENCOUNTER — ANESTHESIA (OUTPATIENT)
Dept: SURGERY | Facility: HOSPITAL | Age: 67
End: 2022-02-17
Payer: MEDICARE

## 2022-02-17 VITALS
BODY MASS INDEX: 34.5 KG/M2 | WEIGHT: 254.63 LBS | HEART RATE: 64 BPM | TEMPERATURE: 99 F | HEART RATE: 77 BPM | WEIGHT: 254.75 LBS | DIASTOLIC BLOOD PRESSURE: 66 MMHG | SYSTOLIC BLOOD PRESSURE: 142 MMHG | RESPIRATION RATE: 16 BRPM | SYSTOLIC BLOOD PRESSURE: 146 MMHG | OXYGEN SATURATION: 94 % | HEIGHT: 72 IN | BODY MASS INDEX: 34.53 KG/M2 | DIASTOLIC BLOOD PRESSURE: 64 MMHG

## 2022-02-17 DIAGNOSIS — N20.0 NEPHROLITHIASIS: Primary | ICD-10-CM

## 2022-02-17 DIAGNOSIS — N20.1 URETERAL STONE: ICD-10-CM

## 2022-02-17 DIAGNOSIS — N52.01 ERECTILE DYSFUNCTION DUE TO ARTERIAL INSUFFICIENCY: ICD-10-CM

## 2022-02-17 DIAGNOSIS — E11.9 DM TYPE 2 WITHOUT RETINOPATHY: Chronic | ICD-10-CM

## 2022-02-17 DIAGNOSIS — N13.8 BPH WITH URINARY OBSTRUCTION: ICD-10-CM

## 2022-02-17 DIAGNOSIS — N40.1 BPH WITH URINARY OBSTRUCTION: ICD-10-CM

## 2022-02-17 DIAGNOSIS — E29.1 MALE HYPOGONADISM: Chronic | ICD-10-CM

## 2022-02-17 PROBLEM — E11.65 UNCONTROLLED TYPE 2 DIABETES MELLITUS WITH HYPERGLYCEMIA: Chronic | Status: RESOLVED | Noted: 2020-09-15 | Resolved: 2022-02-17

## 2022-02-17 LAB
ANION GAP SERPL CALC-SCNC: 13 MMOL/L (ref 8–16)
BACTERIA #/AREA URNS AUTO: ABNORMAL /HPF
BASOPHILS # BLD AUTO: 0.04 K/UL (ref 0–0.2)
BASOPHILS NFR BLD: 0.6 % (ref 0–1.9)
BILIRUB UR QL STRIP: NEGATIVE
BUN SERPL-MCNC: 31 MG/DL (ref 8–23)
CALCIUM SERPL-MCNC: 9.8 MG/DL (ref 8.7–10.5)
CHLORIDE SERPL-SCNC: 100 MMOL/L (ref 95–110)
CLARITY UR REFRACT.AUTO: ABNORMAL
CO2 SERPL-SCNC: 23 MMOL/L (ref 23–29)
COLOR UR AUTO: YELLOW
CREAT SERPL-MCNC: 2.3 MG/DL (ref 0.5–1.4)
DIFFERENTIAL METHOD: ABNORMAL
EOSINOPHIL # BLD AUTO: 0.1 K/UL (ref 0–0.5)
EOSINOPHIL NFR BLD: 1.9 % (ref 0–8)
ERYTHROCYTE [DISTWIDTH] IN BLOOD BY AUTOMATED COUNT: 13.5 % (ref 11.5–14.5)
EST. GFR  (AFRICAN AMERICAN): 32.7 ML/MIN/1.73 M^2
EST. GFR  (NON AFRICAN AMERICAN): 28.3 ML/MIN/1.73 M^2
ESTIMATED AVG GLUCOSE: 160 MG/DL (ref 68–131)
GLUCOSE SERPL-MCNC: 111 MG/DL (ref 70–110)
GLUCOSE UR QL STRIP: ABNORMAL
HBA1C MFR BLD: 7.2 % (ref 4–5.6)
HCT VFR BLD AUTO: 45.2 % (ref 40–54)
HGB BLD-MCNC: 14.2 G/DL (ref 14–18)
HGB UR QL STRIP: ABNORMAL
HYALINE CASTS UR QL AUTO: 0 /LPF
IMM GRANULOCYTES # BLD AUTO: 0.03 K/UL (ref 0–0.04)
IMM GRANULOCYTES NFR BLD AUTO: 0.5 % (ref 0–0.5)
KETONES UR QL STRIP: NEGATIVE
LEUKOCYTE ESTERASE UR QL STRIP: ABNORMAL
LYMPHOCYTES # BLD AUTO: 1.3 K/UL (ref 1–4.8)
LYMPHOCYTES NFR BLD: 20.7 % (ref 18–48)
MCH RBC QN AUTO: 29.3 PG (ref 27–31)
MCHC RBC AUTO-ENTMCNC: 31.4 G/DL (ref 32–36)
MCV RBC AUTO: 93 FL (ref 82–98)
MICROSCOPIC COMMENT: ABNORMAL
MONOCYTES # BLD AUTO: 0.7 K/UL (ref 0.3–1)
MONOCYTES NFR BLD: 11.9 % (ref 4–15)
NEUTROPHILS # BLD AUTO: 4 K/UL (ref 1.8–7.7)
NEUTROPHILS NFR BLD: 64.4 % (ref 38–73)
NITRITE UR QL STRIP: NEGATIVE
NRBC BLD-RTO: 0 /100 WBC
PH UR STRIP: 5 [PH] (ref 5–8)
PLATELET # BLD AUTO: 205 K/UL (ref 150–450)
PMV BLD AUTO: 10.2 FL (ref 9.2–12.9)
POCT GLUCOSE: 109 MG/DL (ref 70–110)
POCT GLUCOSE: 112 MG/DL (ref 70–110)
POTASSIUM SERPL-SCNC: 4.4 MMOL/L (ref 3.5–5.1)
PROT UR QL STRIP: ABNORMAL
RBC # BLD AUTO: 4.85 M/UL (ref 4.6–6.2)
RBC #/AREA URNS AUTO: 91 /HPF (ref 0–4)
SARS-COV-2 RNA RESP QL NAA+PROBE: NOT DETECTED
SODIUM SERPL-SCNC: 136 MMOL/L (ref 136–145)
SP GR UR STRIP: 1.01 (ref 1–1.03)
SQUAMOUS #/AREA URNS AUTO: 0 /HPF
URN SPEC COLLECT METH UR: ABNORMAL
WBC # BLD AUTO: 6.24 K/UL (ref 3.9–12.7)
WBC #/AREA URNS AUTO: 15 /HPF (ref 0–5)

## 2022-02-17 PROCEDURE — 37000009 HC ANESTHESIA EA ADD 15 MINS: Performed by: UROLOGY

## 2022-02-17 PROCEDURE — 99999 PR PBB SHADOW E&M-EST. PATIENT-LVL IV: CPT | Mod: PBBFAC,,, | Performed by: UROLOGY

## 2022-02-17 PROCEDURE — D9220A PRA ANESTHESIA: ICD-10-PCS | Mod: CRNA,,, | Performed by: NURSE ANESTHETIST, CERTIFIED REGISTERED

## 2022-02-17 PROCEDURE — C2617 STENT, NON-COR, TEM W/O DEL: HCPCS | Performed by: UROLOGY

## 2022-02-17 PROCEDURE — 52332 CYSTOSCOPY AND TREATMENT: CPT | Mod: LT,,, | Performed by: UROLOGY

## 2022-02-17 PROCEDURE — 63600175 PHARM REV CODE 636 W HCPCS: Performed by: NURSE ANESTHETIST, CERTIFIED REGISTERED

## 2022-02-17 PROCEDURE — 83036 HEMOGLOBIN GLYCOSYLATED A1C: CPT | Performed by: STUDENT IN AN ORGANIZED HEALTH CARE EDUCATION/TRAINING PROGRAM

## 2022-02-17 PROCEDURE — 71000033 HC RECOVERY, INTIAL HOUR: Performed by: UROLOGY

## 2022-02-17 PROCEDURE — C1769 GUIDE WIRE: HCPCS | Performed by: UROLOGY

## 2022-02-17 PROCEDURE — 25000003 PHARM REV CODE 250: Performed by: STUDENT IN AN ORGANIZED HEALTH CARE EDUCATION/TRAINING PROGRAM

## 2022-02-17 PROCEDURE — 99214 OFFICE O/P EST MOD 30 MIN: CPT | Mod: PBBFAC,25 | Performed by: UROLOGY

## 2022-02-17 PROCEDURE — 36000707: Performed by: UROLOGY

## 2022-02-17 PROCEDURE — 80048 BASIC METABOLIC PNL TOTAL CA: CPT | Performed by: STUDENT IN AN ORGANIZED HEALTH CARE EDUCATION/TRAINING PROGRAM

## 2022-02-17 PROCEDURE — 71000015 HC POSTOP RECOV 1ST HR: Performed by: UROLOGY

## 2022-02-17 PROCEDURE — 36000706: Performed by: UROLOGY

## 2022-02-17 PROCEDURE — 99999 PR PBB SHADOW E&M-EST. PATIENT-LVL IV: ICD-10-PCS | Mod: PBBFAC,,, | Performed by: UROLOGY

## 2022-02-17 PROCEDURE — 99214 OFFICE O/P EST MOD 30 MIN: CPT | Mod: S$PBB,,, | Performed by: UROLOGY

## 2022-02-17 PROCEDURE — 52332 PR CYSTOSCOPY,INSERT URETERAL STENT: ICD-10-PCS | Mod: LT,,, | Performed by: UROLOGY

## 2022-02-17 PROCEDURE — G0378 HOSPITAL OBSERVATION PER HR: HCPCS

## 2022-02-17 PROCEDURE — 36415 COLL VENOUS BLD VENIPUNCTURE: CPT | Performed by: STUDENT IN AN ORGANIZED HEALTH CARE EDUCATION/TRAINING PROGRAM

## 2022-02-17 PROCEDURE — G0379 DIRECT REFER HOSPITAL OBSERV: HCPCS

## 2022-02-17 PROCEDURE — 87086 URINE CULTURE/COLONY COUNT: CPT | Performed by: UROLOGY

## 2022-02-17 PROCEDURE — U0003 INFECTIOUS AGENT DETECTION BY NUCLEIC ACID (DNA OR RNA); SEVERE ACUTE RESPIRATORY SYNDROME CORONAVIRUS 2 (SARS-COV-2) (CORONAVIRUS DISEASE [COVID-19]), AMPLIFIED PROBE TECHNIQUE, MAKING USE OF HIGH THROUGHPUT TECHNOLOGIES AS DESCRIBED BY CMS-2020-01-R: HCPCS | Performed by: STUDENT IN AN ORGANIZED HEALTH CARE EDUCATION/TRAINING PROGRAM

## 2022-02-17 PROCEDURE — D9220A PRA ANESTHESIA: ICD-10-PCS | Mod: ANES,,, | Performed by: ANESTHESIOLOGY

## 2022-02-17 PROCEDURE — 25000003 PHARM REV CODE 250: Performed by: NURSE ANESTHETIST, CERTIFIED REGISTERED

## 2022-02-17 PROCEDURE — U0005 INFEC AGEN DETEC AMPLI PROBE: HCPCS | Performed by: STUDENT IN AN ORGANIZED HEALTH CARE EDUCATION/TRAINING PROGRAM

## 2022-02-17 PROCEDURE — D9220A PRA ANESTHESIA: Mod: CRNA,,, | Performed by: NURSE ANESTHETIST, CERTIFIED REGISTERED

## 2022-02-17 PROCEDURE — D9220A PRA ANESTHESIA: Mod: ANES,,, | Performed by: ANESTHESIOLOGY

## 2022-02-17 PROCEDURE — 99214 PR OFFICE/OUTPT VISIT, EST, LEVL IV, 30-39 MIN: ICD-10-PCS | Mod: S$PBB,,, | Performed by: UROLOGY

## 2022-02-17 PROCEDURE — 81001 URINALYSIS AUTO W/SCOPE: CPT | Performed by: STUDENT IN AN ORGANIZED HEALTH CARE EDUCATION/TRAINING PROGRAM

## 2022-02-17 PROCEDURE — 85025 COMPLETE CBC W/AUTO DIFF WBC: CPT | Performed by: STUDENT IN AN ORGANIZED HEALTH CARE EDUCATION/TRAINING PROGRAM

## 2022-02-17 PROCEDURE — 94761 N-INVAS EAR/PLS OXIMETRY MLT: CPT

## 2022-02-17 PROCEDURE — 37000008 HC ANESTHESIA 1ST 15 MINUTES: Performed by: UROLOGY

## 2022-02-17 PROCEDURE — 82962 GLUCOSE BLOOD TEST: CPT | Performed by: UROLOGY

## 2022-02-17 DEVICE — STENT URETERAL UNIV 6FR 26CM: Type: IMPLANTABLE DEVICE | Site: URETER | Status: FUNCTIONAL

## 2022-02-17 RX ORDER — AMOXICILLIN AND CLAVULANATE POTASSIUM 875; 125 MG/1; MG/1
1 TABLET, FILM COATED ORAL EVERY 12 HOURS
Qty: 14 TABLET | Refills: 0 | Status: ON HOLD | OUTPATIENT
Start: 2022-02-17 | End: 2022-02-25 | Stop reason: HOSPADM

## 2022-02-17 RX ORDER — SODIUM CHLORIDE 0.9 % (FLUSH) 0.9 %
10 SYRINGE (ML) INJECTION
Status: DISCONTINUED | OUTPATIENT
Start: 2022-02-17 | End: 2022-02-17 | Stop reason: HOSPADM

## 2022-02-17 RX ORDER — FAMOTIDINE 10 MG/ML
INJECTION INTRAVENOUS
Status: DISCONTINUED | OUTPATIENT
Start: 2022-02-17 | End: 2022-02-17

## 2022-02-17 RX ORDER — OXYCODONE HYDROCHLORIDE 5 MG/1
5 TABLET ORAL
Status: DISCONTINUED | OUTPATIENT
Start: 2022-02-17 | End: 2022-02-17 | Stop reason: HOSPADM

## 2022-02-17 RX ORDER — ONDANSETRON 4 MG/1
4 TABLET, FILM COATED ORAL EVERY 8 HOURS PRN
Status: DISCONTINUED | OUTPATIENT
Start: 2022-02-17 | End: 2022-02-17 | Stop reason: HOSPADM

## 2022-02-17 RX ORDER — PROPOFOL 10 MG/ML
VIAL (ML) INTRAVENOUS
Status: DISCONTINUED | OUTPATIENT
Start: 2022-02-17 | End: 2022-02-17

## 2022-02-17 RX ORDER — LIDOCAINE HYDROCHLORIDE 20 MG/ML
INJECTION, SOLUTION EPIDURAL; INFILTRATION; INTRACAUDAL; PERINEURAL
Status: DISCONTINUED | OUTPATIENT
Start: 2022-02-17 | End: 2022-02-17

## 2022-02-17 RX ORDER — DEXAMETHASONE SODIUM PHOSPHATE 4 MG/ML
INJECTION, SOLUTION INTRA-ARTICULAR; INTRALESIONAL; INTRAMUSCULAR; INTRAVENOUS; SOFT TISSUE
Status: DISCONTINUED | OUTPATIENT
Start: 2022-02-17 | End: 2022-02-17

## 2022-02-17 RX ORDER — LISINOPRIL AND HYDROCHLOROTHIAZIDE 20; 25 MG/1; MG/1
1 TABLET ORAL DAILY
Status: DISCONTINUED | OUTPATIENT
Start: 2022-02-17 | End: 2022-02-17

## 2022-02-17 RX ORDER — FENTANYL CITRATE 50 UG/ML
INJECTION, SOLUTION INTRAMUSCULAR; INTRAVENOUS
Status: DISCONTINUED | OUTPATIENT
Start: 2022-02-17 | End: 2022-02-17

## 2022-02-17 RX ORDER — OXYCODONE HYDROCHLORIDE 5 MG/1
5 TABLET ORAL EVERY 4 HOURS PRN
Status: DISCONTINUED | OUTPATIENT
Start: 2022-02-17 | End: 2022-02-17 | Stop reason: HOSPADM

## 2022-02-17 RX ORDER — HYDROCHLOROTHIAZIDE 25 MG/1
25 TABLET ORAL DAILY
Status: DISCONTINUED | OUTPATIENT
Start: 2022-02-17 | End: 2022-02-17 | Stop reason: HOSPADM

## 2022-02-17 RX ORDER — ONDANSETRON 2 MG/ML
4 INJECTION INTRAMUSCULAR; INTRAVENOUS DAILY PRN
Status: DISCONTINUED | OUTPATIENT
Start: 2022-02-17 | End: 2022-02-17 | Stop reason: HOSPADM

## 2022-02-17 RX ORDER — ROCURONIUM BROMIDE 10 MG/ML
INJECTION, SOLUTION INTRAVENOUS
Status: DISCONTINUED | OUTPATIENT
Start: 2022-02-17 | End: 2022-02-17

## 2022-02-17 RX ORDER — ONDANSETRON 2 MG/ML
INJECTION INTRAMUSCULAR; INTRAVENOUS
Status: DISCONTINUED | OUTPATIENT
Start: 2022-02-17 | End: 2022-02-17

## 2022-02-17 RX ORDER — ACETAMINOPHEN 325 MG/1
650 TABLET ORAL EVERY 4 HOURS PRN
Status: DISCONTINUED | OUTPATIENT
Start: 2022-02-17 | End: 2022-02-17 | Stop reason: HOSPADM

## 2022-02-17 RX ORDER — ATORVASTATIN CALCIUM 10 MG/1
20 TABLET, FILM COATED ORAL DAILY
Status: DISCONTINUED | OUTPATIENT
Start: 2022-02-17 | End: 2022-02-17 | Stop reason: HOSPADM

## 2022-02-17 RX ORDER — PROMETHAZINE HYDROCHLORIDE 25 MG/1
25 TABLET ORAL EVERY 6 HOURS PRN
Status: DISCONTINUED | OUTPATIENT
Start: 2022-02-17 | End: 2022-02-17 | Stop reason: HOSPADM

## 2022-02-17 RX ORDER — TAMSULOSIN HYDROCHLORIDE 0.4 MG/1
0.4 CAPSULE ORAL DAILY
Status: DISCONTINUED | OUTPATIENT
Start: 2022-02-17 | End: 2022-02-17 | Stop reason: HOSPADM

## 2022-02-17 RX ORDER — MIDAZOLAM HYDROCHLORIDE 1 MG/ML
INJECTION, SOLUTION INTRAMUSCULAR; INTRAVENOUS
Status: DISCONTINUED | OUTPATIENT
Start: 2022-02-17 | End: 2022-02-17

## 2022-02-17 RX ORDER — SODIUM CHLORIDE 9 MG/ML
INJECTION, SOLUTION INTRAVENOUS CONTINUOUS
Status: DISCONTINUED | OUTPATIENT
Start: 2022-02-17 | End: 2022-02-17 | Stop reason: HOSPADM

## 2022-02-17 RX ORDER — ALLOPURINOL 100 MG/1
100 TABLET ORAL DAILY
Status: DISCONTINUED | OUTPATIENT
Start: 2022-02-17 | End: 2022-02-17

## 2022-02-17 RX ORDER — TALC
6 POWDER (GRAM) TOPICAL NIGHTLY PRN
Status: DISCONTINUED | OUTPATIENT
Start: 2022-02-17 | End: 2022-02-17 | Stop reason: HOSPADM

## 2022-02-17 RX ORDER — FENTANYL CITRATE 50 UG/ML
25 INJECTION, SOLUTION INTRAMUSCULAR; INTRAVENOUS EVERY 5 MIN PRN
Status: DISCONTINUED | OUTPATIENT
Start: 2022-02-17 | End: 2022-02-17 | Stop reason: HOSPADM

## 2022-02-17 RX ORDER — OXYBUTYNIN CHLORIDE 5 MG/1
5 TABLET ORAL 3 TIMES DAILY PRN
Qty: 30 TABLET | Refills: 0 | Status: SHIPPED | OUTPATIENT
Start: 2022-02-17 | End: 2022-02-27

## 2022-02-17 RX ORDER — CEFAZOLIN SODIUM 1 G/3ML
INJECTION, POWDER, FOR SOLUTION INTRAMUSCULAR; INTRAVENOUS
Status: DISCONTINUED | OUTPATIENT
Start: 2022-02-17 | End: 2022-02-17

## 2022-02-17 RX ORDER — HALOPERIDOL 5 MG/ML
0.5 INJECTION INTRAMUSCULAR EVERY 10 MIN PRN
Status: DISCONTINUED | OUTPATIENT
Start: 2022-02-17 | End: 2022-02-17 | Stop reason: HOSPADM

## 2022-02-17 RX ORDER — LISINOPRIL 20 MG/1
20 TABLET ORAL DAILY
Status: DISCONTINUED | OUTPATIENT
Start: 2022-02-17 | End: 2022-02-17 | Stop reason: HOSPADM

## 2022-02-17 RX ORDER — ONDANSETRON 8 MG/1
8 TABLET, ORALLY DISINTEGRATING ORAL EVERY 8 HOURS PRN
Status: DISCONTINUED | OUTPATIENT
Start: 2022-02-17 | End: 2022-02-17 | Stop reason: HOSPADM

## 2022-02-17 RX ORDER — POTASSIUM CITRATE 10 MEQ/1
10 TABLET, EXTENDED RELEASE ORAL 2 TIMES DAILY
Status: DISCONTINUED | OUTPATIENT
Start: 2022-02-17 | End: 2022-02-17 | Stop reason: HOSPADM

## 2022-02-17 RX ORDER — GLUCAGON 1 MG
1 KIT INJECTION
Status: DISCONTINUED | OUTPATIENT
Start: 2022-02-17 | End: 2022-02-17 | Stop reason: HOSPADM

## 2022-02-17 RX ORDER — INSULIN ASPART 100 [IU]/ML
1-10 INJECTION, SOLUTION INTRAVENOUS; SUBCUTANEOUS EVERY 6 HOURS PRN
Status: DISCONTINUED | OUTPATIENT
Start: 2022-02-17 | End: 2022-02-17 | Stop reason: HOSPADM

## 2022-02-17 RX ORDER — LISINOPRIL 20 MG/1
20 TABLET ORAL DAILY
Status: DISCONTINUED | OUTPATIENT
Start: 2022-02-17 | End: 2022-02-17

## 2022-02-17 RX ORDER — OXYCODONE AND ACETAMINOPHEN 5; 325 MG/1; MG/1
1 TABLET ORAL EVERY 4 HOURS PRN
Qty: 10 TABLET | Refills: 0 | Status: SHIPPED | OUTPATIENT
Start: 2022-02-17 | End: 2022-02-23 | Stop reason: ALTCHOICE

## 2022-02-17 RX ORDER — HEPARIN SODIUM 5000 [USP'U]/ML
5000 INJECTION, SOLUTION INTRAVENOUS; SUBCUTANEOUS EVERY 8 HOURS
Status: DISCONTINUED | OUTPATIENT
Start: 2022-02-17 | End: 2022-02-17 | Stop reason: HOSPADM

## 2022-02-17 RX ADMIN — DEXAMETHASONE SODIUM PHOSPHATE 4 MG: 4 INJECTION INTRA-ARTICULAR; INTRALESIONAL; INTRAMUSCULAR; INTRAVENOUS; SOFT TISSUE at 06:02

## 2022-02-17 RX ADMIN — FAMOTIDINE 20 MG: 10 INJECTION, SOLUTION INTRAVENOUS at 06:02

## 2022-02-17 RX ADMIN — ATORVASTATIN CALCIUM 20 MG: 10 TABLET, FILM COATED ORAL at 02:02

## 2022-02-17 RX ADMIN — LIDOCAINE HYDROCHLORIDE 100 MG: 20 INJECTION, SOLUTION EPIDURAL; INFILTRATION; INTRACAUDAL at 06:02

## 2022-02-17 RX ADMIN — MIDAZOLAM 1 MG: 1 INJECTION INTRAMUSCULAR; INTRAVENOUS at 06:02

## 2022-02-17 RX ADMIN — SUGAMMADEX 400 MG: 100 INJECTION, SOLUTION INTRAVENOUS at 06:02

## 2022-02-17 RX ADMIN — ONDANSETRON 4 MG: 2 INJECTION INTRAMUSCULAR; INTRAVENOUS at 06:02

## 2022-02-17 RX ADMIN — ROCURONIUM BROMIDE 50 MG: 10 INJECTION, SOLUTION INTRAVENOUS at 06:02

## 2022-02-17 RX ADMIN — SODIUM CHLORIDE: 9 INJECTION, SOLUTION INTRAVENOUS at 05:02

## 2022-02-17 RX ADMIN — FENTANYL CITRATE 100 MCG: 50 INJECTION INTRAMUSCULAR; INTRAVENOUS at 06:02

## 2022-02-17 RX ADMIN — PROPOFOL 200 MG: 10 INJECTION, EMULSION INTRAVENOUS at 06:02

## 2022-02-17 RX ADMIN — CEFAZOLIN 2 G: 330 INJECTION, POWDER, FOR SOLUTION INTRAMUSCULAR; INTRAVENOUS at 06:02

## 2022-02-17 NOTE — PROGRESS NOTES
CHIEF COMPLAINT:    Mr. Person is a 66 y.o. male presenting for a consultation at the request of Dawn Jarquin. Patient presents with flank pain.    PRESENTING ILLNESS:    Lukasz Person is a 66 y.o. male with a history of hypogonadism, has been on TRT (4 pumps daily) with improved energy.    Presented with left flank pain. CT shows mid left ureteral stone ~12 mm with hydro proximal to stone. Cr 2.2 from baseline 1.1. Has had a multiple ESWLs for prior stones. Started with flank pain Sunday radiating to scrotum. Subjective fevers and chills. Has nausea. Had cereal at 0815. Has been straining urine.    Has ED. Has tried viagra and cialis without improvement, has a NTG prescription.     UA LE, nitrite negative.    REVIEW OF SYSTEMS:    Lukasz Person denies headache, blurred vision, fever, nausea, vomiting, chills, abdominal pain, chest pain, sore throat, bleeding per rectum, cough, SOB, recent loss of consciousness, recent mental status changes, seizures, dizziness, or upper or lower extremity weakness.    LOLA  1. 1  2. 0  3. 0  4. 0  5. 0      PATIENT HISTORY:    Past Medical History:   Diagnosis Date    Diabetes mellitus     Onset late 50s/early 60s    Hyperlipidemia     Hypertension     Onset late 50s/early 60s    Sleep apnea     since 2006       Past Surgical History:   Procedure Laterality Date    CORONARY ANGIOGRAPHY N/A 9/30/2020    Procedure: ANGIOGRAM, CORONARY ARTERY;  Surgeon: John West MD;  Location: [facility] CATH LAB;  Service: Cardiology;  Laterality: N/A;    LEFT HEART CATHETERIZATION Left 9/30/2020    Procedure: Left heart cath;  Surgeon: John West MD;  Location: [facility] CATH LAB;  Service: Cardiology;  Laterality: Left;    LITHOTRIPSY      PARATHYROIDECTOMY  1/1/2-107       Family History   Problem Relation Age of Onset    Heart disease Father 70        CABG    Arthritis Father     Diabetes Father     Kidney disease Father         had one kidney removed in early thirties     Arthritis Mother     Colon cancer Brother 32    Diabetes Paternal Grandmother     Colon polyps Paternal Grandfather     Colon cancer Paternal Grandfather     Cancer Paternal Grandfather         colon cancer at age 62    Alcohol abuse Paternal Aunt     Arthritis Sister     Arthritis Sister     Arthritis Brother     Cancer Brother         colon cancer at age 32    Cancer Maternal Grandfather         throat cancer    Hypertension Sister        Social History     Socioeconomic History    Marital status:    Tobacco Use    Smoking status: Former Smoker     Packs/day: 1.00     Years: 7.00     Pack years: 7.00     Types: Cigarettes, Cigars     Start date: 1972     Quit date:      Years since quittin.7    Smokeless tobacco: Never Used    Tobacco comment: smoking was off and on.  cumulative 7 years.  never more than three years in one stretch or more lj   Substance and Sexual Activity    Alcohol use: Yes     Alcohol/week: 3.0 standard drinks     Types: 2 Cans of beer, 1 Shots of liquor per week     Comment: don't drink regularly.  when I do, three beers or shots    Drug use: Not Currently     Types: Marijuana    Sexual activity: Not Currently     Partners: Female     Birth control/protection: None     Comment:      Social Determinants of Health     Financial Resource Strain: Low Risk     Difficulty of Paying Living Expenses: Not very hard   Food Insecurity: No Food Insecurity    Worried About Running Out of Food in the Last Year: Never true    Ran Out of Food in the Last Year: Never true   Transportation Needs: No Transportation Needs    Lack of Transportation (Medical): No    Lack of Transportation (Non-Medical): No   Physical Activity: Insufficiently Active    Days of Exercise per Week: 4 days    Minutes of Exercise per Session: 30 min   Stress: No Stress Concern Present    Feeling of Stress : Only a little   Social Connections: Unknown    Frequency of Communication  with Friends and Family: Once a week    Frequency of Social Gatherings with Friends and Family: Once a week    Active Member of Clubs or Organizations: No    Attends Club or Organization Meetings: Never    Marital Status:    Housing Stability: Low Risk     Unable to Pay for Housing in the Last Year: No    Number of Places Lived in the Last Year: 1    Unstable Housing in the Last Year: No       Allergies:  Patient has no known allergies.    Medications:    Current Outpatient Medications:     ACCU-CHEK SOFTCLIX LANCETS Misc, USE EVERY DAY, Disp: 100 each, Rfl: 6    allopurinoL (ZYLOPRIM) 100 MG tablet, TAKE 1 TABLET BY MOUTH EVERY DAY, Disp: 30 tablet, Rfl: 10    blood sugar diagnostic (ACCU-CHEK ANTONELLA PLUS TEST STRP) Strp, Inject 1 strip into the skin 2 (two) times daily before meals., Disp: 100 strip, Rfl: 11    blood-glucose meter kit, Checks blood sugars 1x/daily., Disp: 1 each, Rfl: 12    fenofibrate 160 MG Tab, TAKE 1 TABLET(160 MG) BY MOUTH EVERY DAY, Disp: 30 tablet, Rfl: 10    glimepiride (AMARYL) 2 MG tablet, TAKE 2 TABLETS BY MOUTH EVERY MORNING, Disp: 60 tablet, Rfl: 5    ketorolac (TORADOL) 10 mg tablet, Take 1 tablet (10 mg total) by mouth every 6 (six) hours as needed for Pain., Disp: 20 tablet, Rfl: 1    lisinopriL (PRINIVIL,ZESTRIL) 20 MG tablet, Take 1 tablet (20 mg total) by mouth once daily., Disp: 90 tablet, Rfl: 3    lisinopriL-hydrochlorothiazide (PRINZIDE,ZESTORETIC) 20-25 mg Tab, TAKE 1 TABLET BY MOUTH EVERY DAY, Disp: 90 tablet, Rfl: 1    metFORMIN (GLUCOPHAGE) 500 MG tablet, TAKE 2 TABLETS BY MOUTH EVERY MORNING AND 2 TABLETS WITH DINNER, Disp: 120 tablet, Rfl: 10    nitroGLYCERIN (NITROSTAT) 0.4 MG SL tablet, Place 1 tablet (0.4 mg total) under the tongue every 5 (five) minutes as needed for Chest pain. If you need a third tablet, call 911, Disp: 60 tablet, Rfl: 12    ondansetron (ZOFRAN) 4 MG tablet, Take 1 tablet (4 mg total) by mouth every 8 (eight) hours as  needed for Nausea., Disp: 16 tablet, Rfl: 1    potassium citrate (UROCIT-K) 10 mEq (1,080 mg) TbSR, TAKE 1 TABLET BY MOUTH TWICE DAILY, Disp: 60 tablet, Rfl: 9    rosuvastatin (CRESTOR) 20 MG tablet, TAKE 1 TABLET(20 MG) BY MOUTH EVERY DAY, Disp: 30 tablet, Rfl: 11    tamsulosin (FLOMAX) 0.4 mg Cap, Take 1 capsule (0.4 mg total) by mouth once daily., Disp: 30 capsule, Rfl: 2    testosterone (ANDROGEL) 20.25 mg/1.25 gram (1.62 %) GlPm, Apply 2 pumps to shoulders daily, Disp: 1 each, Rfl: 5    PHYSICAL EXAMINATION:    The patient generally appears in good health, is appropriately interactive, and is in no apparent distress.     Eyes: anicteric sclerae, moist conjunctivae; no lid-lag; PERRLA     HENT: Atraumatic; oropharynx clear with moist mucous membranes and no mucosal ulcerations;normal hard and soft palate.  No evidence of lymphadenopathy.    Neck: Trachea midline.  No thyromegaly.    Skin: No lesions.    Mental: Cooperative with normal affect.  Is oriented to time, place, and person.    Neuro: Grossly intact.    Chest: Normal inspiratory effort.   No accessory muscles.  No audible wheezes.  Respirations symmetric on inspiration and expiration.    Heart: Regular rhythm.      Abdomen:  Soft, non-tender. No masses or organomegaly. Bladder is not palpable. No evidence of flank discomfort. No evidence of inguinal hernia.    Genitourinary: The penis *** circumcised with no evidence of plaques or induration. The urethral meatus is normal. The testes, epididymides, and cord structures are normal in size and contour bilaterally. The scrotum is normal in size and contour.    Normal anal sphincter tone. No rectal mass.    The prostate is *** g. Normal landmarks. Lateral sulci. Median furrow intact.  No nodularity or induration. Seminal vesicles are normal.    Extremities: No clubbing, cyanosis, or edema      LABS:    ***  Lab Results   Component Value Date    PSA 1.2 10/06/2021    PSA 0.60 10/17/2020        IMPRESSION:    No diagnosis found.      PLAN:    1. ***    Copy to: ***

## 2022-02-17 NOTE — ANESTHESIA PREPROCEDURE EVALUATION
[facility]  Anesthesia Pre-Operative Evaluation         Patient Name: Lukasz Person  YOB: 1955  MRN: [id number]    SUBJECTIVE:     Pre-operative evaluation for Procedure(s) (LRB):  CYSTOSCOPY, WITH URETERAL STENT INSERTION (Left)  CYSTOURETEROSCOPY,WITH HOLMIUM LASER LITHOTRIPSY OF URETERAL CALCULUS (Left)     02/17/2022    Lukasz Person is a 66 y.o. male w/ a significant PMHx of DM, HTN, HLD, urolithiasis presently admitted for ureteral stone.    Patient now presents for the above procedure(s).    Cardiac pet scan stress 9/14/20       The relative PET images are normal showing no clinically significant regional resting or stress induced perfusion defects.    Whole heart absolute myocardial perfusion (cc/min/g) averaged 0.59 cc/min/g at rest, which is reduced, 1.73 cc/min/g at stress, which is mildly reduced, and 2.94  CFR, which is normal.    There is mild thinning of the mid and distal inferior wall with preserved flow capacity.  These findings are consistent with non-obstructive RCA disease or possible RCA  with excellent collaterals.    Gated perfusion images showed an ejection fraction of 57% at rest and 79% during stress. Normal ejection fraction is greater than 51%.    Wall motion was normal at rest and during stress.    LV cavity size is normal at rest and stress.    The EKG portion of this study is negative for ischemia.    Arrhythmias during stress: occasional PVCs.    The patient reported no chest pain during the stress test.    There are no prior studies for comparison.      [facility] 9/20/20    Left Main   Exhibits minimal luminal irregularities.   Left Anterior Descending   Mid LAD lesion is 30% stenosed.   Dist LAD lesion is 30% stenosed.   First Diagonal Branch   The vessel exhibits minimal luminal irregularities.   Second Diagonal Branch   There is mild diffuse disease throughout the  vessel.   Third Diagonal Branch   The vessel is angiographically normal.   Left Circumflex   Exhibits minimal luminal irregularities.   First Obtuse Marginal Branch   There is mild diffuse disease throughout the vessel.   Second Obtuse Marginal Branch   The vessel exhibits minimal luminal irregularities.   Third Obtuse Marginal Branch   There is mild diffuse disease throughout the vessel.   Fourth Obtuse Marginal Branch   The vessel exhibits minimal luminal irregularities.   Right Coronary Artery   Exhibits minimal luminal irregularities.   Right Posterior Descending Artery   RPDA lesion is 50% stenosed. iFR was performed with the GUIDEWIRE VERRATA + JTIP 185CM. iFR: 0.98.   Right Posterior Atrioventricular Artery   The vessel exhibits minimal luminal irregularities.   First Right Posterolateral Branch   The vessel exhibits minimal luminal irregularities.         LDA: None documented.       Prev airway: None documented.    Drips: None documented.   sodium chloride 0.9%         Patient Active Problem List   Diagnosis    Type 2 diabetes mellitus with kidney complication, without long-term current use of insulin    KUMAR on CPAP    Gout    Hypertriglyceridemia    Hyperparathyroidism, primary    Hypertension associated with diabetes    Hyperlipidemia associated with type 2 diabetes mellitus    Severe obesity (BMI 35.0-35.9 with comorbidity)    Obesity, diabetes, and hypertension syndrome    DM type 2 without retinopathy    Diabetes mellitus with cataract    Male hypogonadism    Stage 3a chronic kidney disease    Coronary artery disease involving native coronary artery of native heart    Low serum alkaline phosphatase    BPH with urinary obstruction    Erectile dysfunction due to arterial insufficiency    Chronic left shoulder pain    Nephrolithiasis       Review of patient's allergies indicates:  No Known Allergies    Current Inpatient Medications:   allopurinoL  100 mg Oral Daily    atorvastatin  20  mg Oral Daily    hydroCHLOROthiazide  25 mg Oral Daily    lisinopriL  20 mg Oral Daily    potassium citrate  10 mEq Oral BID    tamsulosin  0.4 mg Oral Daily       No current facility-administered medications on file prior to encounter.     Current Outpatient Medications on File Prior to Encounter   Medication Sig Dispense Refill    ACCU-CHEK SOFTCLIX LANCETS Misc USE EVERY  each 6    allopurinoL (ZYLOPRIM) 100 MG tablet TAKE 1 TABLET BY MOUTH EVERY DAY 30 tablet 10    blood sugar diagnostic (ACCU-CHEK ANTONELLA PLUS TEST STRP) Strp Inject 1 strip into the skin 2 (two) times daily before meals. 100 strip 11    blood-glucose meter kit Checks blood sugars 1x/daily. 1 each 12    fenofibrate 160 MG Tab TAKE 1 TABLET(160 MG) BY MOUTH EVERY DAY 30 tablet 10    glimepiride (AMARYL) 2 MG tablet TAKE 2 TABLETS BY MOUTH EVERY MORNING 60 tablet 5    ketorolac (TORADOL) 10 mg tablet Take 1 tablet (10 mg total) by mouth every 6 (six) hours as needed for Pain. 20 tablet 1    lisinopriL (PRINIVIL,ZESTRIL) 20 MG tablet Take 1 tablet (20 mg total) by mouth once daily. 90 tablet 3    lisinopriL-hydrochlorothiazide (PRINZIDE,ZESTORETIC) 20-25 mg Tab TAKE 1 TABLET BY MOUTH EVERY DAY 90 tablet 1    metFORMIN (GLUCOPHAGE) 500 MG tablet TAKE 2 TABLETS BY MOUTH EVERY MORNING AND 2 TABLETS WITH DINNER 120 tablet 10    nitroGLYCERIN (NITROSTAT) 0.4 MG SL tablet Place 1 tablet (0.4 mg total) under the tongue every 5 (five) minutes as needed for Chest pain. If you need a third tablet, call 911 60 tablet 12    ondansetron (ZOFRAN) 4 MG tablet Take 1 tablet (4 mg total) by mouth every 8 (eight) hours as needed for Nausea. 16 tablet 1    potassium citrate (UROCIT-K) 10 mEq (1,080 mg) TbSR TAKE 1 TABLET BY MOUTH TWICE DAILY 60 tablet 9    rosuvastatin (CRESTOR) 20 MG tablet TAKE 1 TABLET(20 MG) BY MOUTH EVERY DAY 30 tablet 11    tamsulosin (FLOMAX) 0.4 mg Cap Take 1 capsule (0.4 mg total) by mouth once daily. 30 capsule 2     testosterone (ANDROGEL) 20.25 mg/1.25 gram (1.62 %) GlPm Apply 2 pumps to shoulders daily 1 each 5       Past Surgical History:   Procedure Laterality Date    CORONARY ANGIOGRAPHY N/A 2020    Procedure: ANGIOGRAM, CORONARY ARTERY;  Surgeon: John West MD;  Location: [facility] CATH LAB;  Service: Cardiology;  Laterality: N/A;    LEFT HEART CATHETERIZATION Left 2020    Procedure: Left heart cath;  Surgeon: John West MD;  Location: [facility] CATH LAB;  Service: Cardiology;  Laterality: Left;    LITHOTRIPSY      PARATHYROIDECTOMY  -107       Social History     Socioeconomic History    Marital status:    Tobacco Use    Smoking status: Former Smoker     Packs/day: 1.00     Years: 7.00     Pack years: 7.00     Types: Cigarettes, Cigars     Start date: 1972     Quit date:      Years since quittin.7    Smokeless tobacco: Never Used    Tobacco comment: smoking was off and on.  cumulative 7 years.  never more than three years in one stretch or more lj   Substance and Sexual Activity    Alcohol use: Yes     Alcohol/week: 3.0 standard drinks     Types: 2 Cans of beer, 1 Shots of liquor per week     Comment: don't drink regularly.  when I do, three beers or shots    Drug use: Not Currently     Types: Marijuana    Sexual activity: Not Currently     Partners: Female     Birth control/protection: None     Comment:      Social Determinants of Health     Financial Resource Strain: Low Risk     Difficulty of Paying Living Expenses: Not very hard   Food Insecurity: No Food Insecurity    Worried About Running Out of Food in the Last Year: Never true    Ran Out of Food in the Last Year: Never true   Transportation Needs: No Transportation Needs    Lack of Transportation (Medical): No    Lack of Transportation (Non-Medical): No   Physical Activity: Insufficiently Active    Days of Exercise per Week: 4 days    Minutes of Exercise per Session: 30 min   Stress: No Stress  Concern Present    Feeling of Stress : Only a little   Social Connections: Unknown    Frequency of Communication with Friends and Family: Once a week    Frequency of Social Gatherings with Friends and Family: Once a week    Active Member of Clubs or Organizations: No    Attends Club or Organization Meetings: Never    Marital Status:    Housing Stability: Low Risk     Unable to Pay for Housing in the Last Year: No    Number of Places Lived in the Last Year: 1    Unstable Housing in the Last Year: No       OBJECTIVE:     Vital Signs Range (Last 24H):  Temp:  [35.9 °C (96.7 °F)]   Pulse:  [69-77]   Resp:  [18]   BP: (142-158)/(64-75)   SpO2:  [96 %-97 %]       Significant Labs:  Lab Results   Component Value Date    WBC 6.24 02/17/2022    HGB 14.2 02/17/2022    HCT 45.2 02/17/2022     02/17/2022    CHOL 101 (L) 10/06/2021    TRIG 154 (H) 10/06/2021    HDL 36 (L) 10/06/2021    ALT 25 10/06/2021    AST 27 10/06/2021     02/17/2022    K 4.4 02/17/2022     02/17/2022    CREATININE 2.3 (H) 02/17/2022    BUN 31 (H) 02/17/2022    CO2 23 02/17/2022    TSH 1.569 10/06/2021    PSA 1.2 10/06/2021    HGBA1C 7.2 (H) 02/17/2022       Diagnostic Studies: No relevant studies.    EKG:   Results for orders placed or performed in visit on 08/20/20   IN OFFICE EKG 12-LEAD (to [place])    Collection Time: 08/20/20  2:47 PM    Narrative    Test Reason : R07.89    Vent. Rate : 069 BPM     Atrial Rate : 069 BPM     P-R Int : 140 ms          QRS Dur : 090 ms      QT Int : 412 ms       P-R-T Axes : 060 -24 -61 degrees     QTc Int : 441 ms    Normal sinus rhythm  ST and/or T wave abnormalities suggesting myocardial ischemia  Abnormal ECG  No previous ECGs available  Confirmed by OLGA MARAVILLA MD ([id number]) on 8/21/2020 8:36:29 PM    Referred By:  SHAKIR           Confirmed By:OLGA MARAVILLA MD       2D ECHO:  TTE:  No results found for this or any previous visit.    KARSON:  No results found for this or any previous  visit.    ASSESSMENT/PLAN:         Anesthesia Evaluation    I have reviewed the Patient Summary Reports.    I have reviewed the Nursing Notes.    I have reviewed the Medications.     Review of Systems  Anesthesia Hx:  No problems with previous Anesthesia  History of prior surgery of interest to airway management or planning: Denies Family Hx of Anesthesia complications.   Denies Personal Hx of Anesthesia complications.   Cardiovascular:   Hypertension CAD      Pulmonary:   Sleep Apnea    Renal/:   Chronic Renal Disease    Hepatic/GI:  Hepatic/GI Normal    Neurological:  Neurology Normal        Physical Exam  General:  Well nourished    Airway/Jaw/Neck:  Airway Findings: Mouth Opening: Normal Tongue: Normal  General Airway Assessment: Adult  Mallampati: III  Improves to II with phonation.  TM Distance: Normal, at least 6 cm       Chest/Lungs:  Chest/Lungs Clear    Heart/Vascular:  Heart Findings: Normal            Anesthesia Plan  Type of Anesthesia, risks & benefits discussed:  Anesthesia Type:  general, MAC    Patient's Preference:   Plan Factors:          Intra-op Monitoring Plan: standard ASA monitors  Intra-op Monitoring Plan Comments:   Post Op Pain Control Plan: per primary service following discharge from PACU, IV/PO Opioids PRN and multimodal analgesia  Post Op Pain Control Plan Comments:     Induction:   IV  Beta Blocker:  Patient is not currently on a Beta-Blocker (No further documentation required).       Informed Consent: Patient understands risks and agrees with Anesthesia plan.  Questions answered. Anesthesia consent signed with patient.  ASA Score: 3     Day of Surgery Review of History & Physical: I have interviewed and examined the patient. I have reviewed the patient's H&P dated:            Ready For Surgery From Anesthesia Perspective.

## 2022-02-17 NOTE — H&P (VIEW-ONLY)
[facility] [place]  Staff: Lukasz Hughes MD    CC: Ureteral stone    HPI:  Lukasz Person is a 66 y.o. male with a history of DM, HTN, HLD, urolithiasis presently admitted for ureteral stone.    Patient states that pain began  night, left flank pain radiating to left scrotum. States that he has had subjective fevers and chills. Some nausea, no vomiting. Denies chest pain, SOB. Denies hematuria, dysuria.     On assessment, patient is AF, HDS. Cr 2.2 (baseline 1.1). CT shows left mid ureteral stone, bilateral non obstructing renal stones. UA not concerning for infection in clinic.      Review of Systems   Constitutional: Positive for chills and fever.   Respiratory: Negative for shortness of breath.    Cardiovascular: Negative for chest pain.   Gastrointestinal: Positive for abdominal pain and nausea. Negative for constipation, diarrhea and vomiting.   Genitourinary: Positive for flank pain. Negative for dysuria and hematuria.         Past Medical History:   Diagnosis Date    Diabetes mellitus     Onset late 50s/early 60s    Hyperlipidemia     Hypertension     Onset late 50s/early 60s    Sleep apnea     since        Past Surgical History:   Procedure Laterality Date    CORONARY ANGIOGRAPHY N/A 2020    Procedure: ANGIOGRAM, CORONARY ARTERY;  Surgeon: John West MD;  Location: [facility] CATH LAB;  Service: Cardiology;  Laterality: N/A;    LEFT HEART CATHETERIZATION Left 2020    Procedure: Left heart cath;  Surgeon: John West MD;  Location: [facility] CATH LAB;  Service: Cardiology;  Laterality: Left;    LITHOTRIPSY      PARATHYROIDECTOMY  -107       Social History     Socioeconomic History    Marital status:    Tobacco Use    Smoking status: Former Smoker     Packs/day: 1.00     Years: 7.00     Pack years: 7.00     Types: Cigarettes, Cigars     Start date: 1972     Quit date:      Years since quittin.7    Smokeless tobacco: Never Used    Tobacco  comment: smoking was off and on.  cumulative 7 years.  never more than three years in one stretch or more lj   Substance and Sexual Activity    Alcohol use: Yes     Alcohol/week: 3.0 standard drinks     Types: 2 Cans of beer, 1 Shots of liquor per week     Comment: don't drink regularly.  when I do, three beers or shots    Drug use: Not Currently     Types: Marijuana    Sexual activity: Not Currently     Partners: Female     Birth control/protection: None     Comment:      Social Determinants of Health     Financial Resource Strain: Low Risk     Difficulty of Paying Living Expenses: Not very hard   Food Insecurity: No Food Insecurity    Worried About Running Out of Food in the Last Year: Never true    Ran Out of Food in the Last Year: Never true   Transportation Needs: No Transportation Needs    Lack of Transportation (Medical): No    Lack of Transportation (Non-Medical): No   Physical Activity: Insufficiently Active    Days of Exercise per Week: 4 days    Minutes of Exercise per Session: 30 min   Stress: No Stress Concern Present    Feeling of Stress : Only a little   Social Connections: Unknown    Frequency of Communication with Friends and Family: Once a week    Frequency of Social Gatherings with Friends and Family: Once a week    Active Member of Clubs or Organizations: No    Attends Club or Organization Meetings: Never    Marital Status:    Housing Stability: Low Risk     Unable to Pay for Housing in the Last Year: No    Number of Places Lived in the Last Year: 1    Unstable Housing in the Last Year: No       Family History   Problem Relation Age of Onset    Heart disease Father 70        CABG    Arthritis Father     Diabetes Father     Kidney disease Father         had one kidney removed in early thirties    Arthritis Mother     Colon cancer Brother 32    Diabetes Paternal Grandmother     Colon polyps Paternal Grandfather     Colon cancer Paternal Grandfather      Cancer Paternal Grandfather         colon cancer at age 62    Alcohol abuse Paternal Aunt     Arthritis Sister     Arthritis Sister     Arthritis Brother     Cancer Brother         colon cancer at age 32    Cancer Maternal Grandfather         throat cancer    Hypertension Sister        Review of patient's allergies indicates:  No Known Allergies    No current facility-administered medications on file prior to encounter.     Current Outpatient Medications on File Prior to Encounter   Medication Sig Dispense Refill    ACCU-CHEK SOFTCLIX LANCETS Misc USE EVERY  each 6    allopurinoL (ZYLOPRIM) 100 MG tablet TAKE 1 TABLET BY MOUTH EVERY DAY 30 tablet 10    blood sugar diagnostic (ACCU-CHEK ANTONELLA PLUS TEST STRP) Strp Inject 1 strip into the skin 2 (two) times daily before meals. 100 strip 11    blood-glucose meter kit Checks blood sugars 1x/daily. 1 each 12    fenofibrate 160 MG Tab TAKE 1 TABLET(160 MG) BY MOUTH EVERY DAY 30 tablet 10    glimepiride (AMARYL) 2 MG tablet TAKE 2 TABLETS BY MOUTH EVERY MORNING 60 tablet 5    ketorolac (TORADOL) 10 mg tablet Take 1 tablet (10 mg total) by mouth every 6 (six) hours as needed for Pain. 20 tablet 1    lisinopriL (PRINIVIL,ZESTRIL) 20 MG tablet Take 1 tablet (20 mg total) by mouth once daily. 90 tablet 3    lisinopriL-hydrochlorothiazide (PRINZIDE,ZESTORETIC) 20-25 mg Tab TAKE 1 TABLET BY MOUTH EVERY DAY 90 tablet 1    metFORMIN (GLUCOPHAGE) 500 MG tablet TAKE 2 TABLETS BY MOUTH EVERY MORNING AND 2 TABLETS WITH DINNER 120 tablet 10    nitroGLYCERIN (NITROSTAT) 0.4 MG SL tablet Place 1 tablet (0.4 mg total) under the tongue every 5 (five) minutes as needed for Chest pain. If you need a third tablet, call 911 60 tablet 12    ondansetron (ZOFRAN) 4 MG tablet Take 1 tablet (4 mg total) by mouth every 8 (eight) hours as needed for Nausea. 16 tablet 1    potassium citrate (UROCIT-K) 10 mEq (1,080 mg) TbSR TAKE 1 TABLET BY MOUTH TWICE DAILY 60 tablet 9     rosuvastatin (CRESTOR) 20 MG tablet TAKE 1 TABLET(20 MG) BY MOUTH EVERY DAY 30 tablet 11    tamsulosin (FLOMAX) 0.4 mg Cap Take 1 capsule (0.4 mg total) by mouth once daily. 30 capsule 2    testosterone (ANDROGEL) 20.25 mg/1.25 gram (1.62 %) GlPm Apply 2 pumps to shoulders daily 1 each 5         Physical Exam:  Estimated body mass index is 34.55 kg/m² as calculated from the following:    Height as of an earlier encounter on 2/17/22: 6' (1.829 m).    Weight as of an earlier encounter on 2/17/22: 115.5 kg (254 lb 11.9 oz).    General: No acute distress, well developed. AAOx3  Head: Normocephalic, Atraumatic  Eyes: Extra-occular movements intact, No discharge  Neck: supple, symmetrical, trachea midline  Lungs: normal respiratory effort, no respiratory distress, no wheezes  CV: regular rate, 2+ pulses  Abdomen: soft, non-tender, non-distended, no organomegaly  : Left CVA tenderness  MSK: no edema, no deformities, normal ROM  Skin: skin color, texture, turgor normal.  Neurologic: no focal deficits, sensation intact    Labs:    Urine dipstick today - negative for LE, nitrites    Lab Results   Component Value Date    WBC 5.81 10/06/2021    HGB 14.1 10/06/2021    HCT 44.2 10/06/2021    MCV 93 10/06/2021     10/06/2021           BMP  Lab Results   Component Value Date     10/06/2021    K 4.7 10/06/2021     10/06/2021    CO2 23 10/06/2021    BUN 23 10/06/2021    CREATININE 2.2 (H) 02/15/2022    CALCIUM 10.2 02/15/2022    ANIONGAP 15 10/06/2021    ESTGFRAFRICA 34.6 (A) 02/15/2022    EGFRNONAA 29.9 (A) 02/15/2022       Lab Results   Component Value Date    PSA 1.2 10/06/2021    PSA 0.60 10/17/2020       Imaging:  As above    Assessment: Lukasz Person is a 66 y.o. male with left ureteral stone and ABDI    Plan:     1. To OR on 2/17 for left ureteral stent vs ureteroscopy    Mina Pride MD

## 2022-02-17 NOTE — NURSING
Received pt resting in bed. No resp. distress noted. VS WNL, continues to complain of flank pain, but does not want to take any medications for pain due to possibility of going into surgery tonight. Covid test completed per doctors order. Pt oriented to staff, call light within reach. Safety precautions in place. Will continue to monitor.

## 2022-02-17 NOTE — H&P
[facility]  Staff: Lukasz Hughes MD    CC: Ureteral stone    HPI:  Lukasz Person is a 66 y.o. male with a history of DM, HTN, HLD, urolithiasis presently admitted for ureteral stone.    Patient states that pain began  night, left flank pain radiating to left scrotum. States that he has had subjective fevers and chills. Some nausea, no vomiting. Denies chest pain, SOB. Denies hematuria, dysuria.     On assessment, patient is AF, HDS. Cr 2.2 (baseline 1.1). CT shows left mid ureteral stone, bilateral non obstructing renal stones. UA not concerning for infection in clinic.      Review of Systems   Constitutional: Positive for chills and fever.   Respiratory: Negative for shortness of breath.    Cardiovascular: Negative for chest pain.   Gastrointestinal: Positive for abdominal pain and nausea. Negative for constipation, diarrhea and vomiting.   Genitourinary: Positive for flank pain. Negative for dysuria and hematuria.         Past Medical History:   Diagnosis Date    Diabetes mellitus     Onset late 50s/early 60s    Hyperlipidemia     Hypertension     Onset late 50s/early 60s    Sleep apnea     since        Past Surgical History:   Procedure Laterality Date    CORONARY ANGIOGRAPHY N/A 2020    Procedure: ANGIOGRAM, CORONARY ARTERY;  Surgeon: John West MD;  Location: [facility] CATH LAB;  Service: Cardiology;  Laterality: N/A;    LEFT HEART CATHETERIZATION Left 2020    Procedure: Left heart cath;  Surgeon: John West MD;  Location: [facility] CATH LAB;  Service: Cardiology;  Laterality: Left;    LITHOTRIPSY      PARATHYROIDECTOMY  -107       Social History     Socioeconomic History    Marital status:    Tobacco Use    Smoking status: Former Smoker     Packs/day: 1.00     Years: 7.00     Pack years: 7.00     Types: Cigarettes, Cigars     Start date: 1972     Quit date:      Years since quittin.7    Smokeless tobacco: Never Used    Tobacco  comment: smoking was off and on.  cumulative 7 years.  never more than three years in one stretch or more lj   Substance and Sexual Activity    Alcohol use: Yes     Alcohol/week: 3.0 standard drinks     Types: 2 Cans of beer, 1 Shots of liquor per week     Comment: don't drink regularly.  when I do, three beers or shots    Drug use: Not Currently     Types: Marijuana    Sexual activity: Not Currently     Partners: Female     Birth control/protection: None     Comment:      Social Determinants of Health     Financial Resource Strain: Low Risk     Difficulty of Paying Living Expenses: Not very hard   Food Insecurity: No Food Insecurity    Worried About Running Out of Food in the Last Year: Never true    Ran Out of Food in the Last Year: Never true   Transportation Needs: No Transportation Needs    Lack of Transportation (Medical): No    Lack of Transportation (Non-Medical): No   Physical Activity: Insufficiently Active    Days of Exercise per Week: 4 days    Minutes of Exercise per Session: 30 min   Stress: No Stress Concern Present    Feeling of Stress : Only a little   Social Connections: Unknown    Frequency of Communication with Friends and Family: Once a week    Frequency of Social Gatherings with Friends and Family: Once a week    Active Member of Clubs or Organizations: No    Attends Club or Organization Meetings: Never    Marital Status:    Housing Stability: Low Risk     Unable to Pay for Housing in the Last Year: No    Number of Places Lived in the Last Year: 1    Unstable Housing in the Last Year: No       Family History   Problem Relation Age of Onset    Heart disease Father 70        CABG    Arthritis Father     Diabetes Father     Kidney disease Father         had one kidney removed in early thirties    Arthritis Mother     Colon cancer Brother 32    Diabetes Paternal Grandmother     Colon polyps Paternal Grandfather     Colon cancer Paternal Grandfather      Cancer Paternal Grandfather         colon cancer at age 62    Alcohol abuse Paternal Aunt     Arthritis Sister     Arthritis Sister     Arthritis Brother     Cancer Brother         colon cancer at age 32    Cancer Maternal Grandfather         throat cancer    Hypertension Sister        Review of patient's allergies indicates:  No Known Allergies    No current facility-administered medications on file prior to encounter.     Current Outpatient Medications on File Prior to Encounter   Medication Sig Dispense Refill    ACCU-CHEK SOFTCLIX LANCETS Misc USE EVERY  each 6    allopurinoL (ZYLOPRIM) 100 MG tablet TAKE 1 TABLET BY MOUTH EVERY DAY 30 tablet 10    blood sugar diagnostic (ACCU-CHEK ANTONELLA PLUS TEST STRP) Strp Inject 1 strip into the skin 2 (two) times daily before meals. 100 strip 11    blood-glucose meter kit Checks blood sugars 1x/daily. 1 each 12    fenofibrate 160 MG Tab TAKE 1 TABLET(160 MG) BY MOUTH EVERY DAY 30 tablet 10    glimepiride (AMARYL) 2 MG tablet TAKE 2 TABLETS BY MOUTH EVERY MORNING 60 tablet 5    ketorolac (TORADOL) 10 mg tablet Take 1 tablet (10 mg total) by mouth every 6 (six) hours as needed for Pain. 20 tablet 1    lisinopriL (PRINIVIL,ZESTRIL) 20 MG tablet Take 1 tablet (20 mg total) by mouth once daily. 90 tablet 3    lisinopriL-hydrochlorothiazide (PRINZIDE,ZESTORETIC) 20-25 mg Tab TAKE 1 TABLET BY MOUTH EVERY DAY 90 tablet 1    metFORMIN (GLUCOPHAGE) 500 MG tablet TAKE 2 TABLETS BY MOUTH EVERY MORNING AND 2 TABLETS WITH DINNER 120 tablet 10    nitroGLYCERIN (NITROSTAT) 0.4 MG SL tablet Place 1 tablet (0.4 mg total) under the tongue every 5 (five) minutes as needed for Chest pain. If you need a third tablet, call 911 60 tablet 12    ondansetron (ZOFRAN) 4 MG tablet Take 1 tablet (4 mg total) by mouth every 8 (eight) hours as needed for Nausea. 16 tablet 1    potassium citrate (UROCIT-K) 10 mEq (1,080 mg) TbSR TAKE 1 TABLET BY MOUTH TWICE DAILY 60 tablet 9     rosuvastatin (CRESTOR) 20 MG tablet TAKE 1 TABLET(20 MG) BY MOUTH EVERY DAY 30 tablet 11    tamsulosin (FLOMAX) 0.4 mg Cap Take 1 capsule (0.4 mg total) by mouth once daily. 30 capsule 2    testosterone (ANDROGEL) 20.25 mg/1.25 gram (1.62 %) GlPm Apply 2 pumps to shoulders daily 1 each 5         Physical Exam:  Estimated body mass index is 34.55 kg/m² as calculated from the following:    Height as of an earlier encounter on 2/17/22: 6' (1.829 m).    Weight as of an earlier encounter on 2/17/22: 115.5 kg (254 lb 11.9 oz).    General: No acute distress, well developed. AAOx3  Head: Normocephalic, Atraumatic  Eyes: Extra-occular movements intact, No discharge  Neck: supple, symmetrical, trachea midline  Lungs: normal respiratory effort, no respiratory distress, no wheezes  CV: regular rate, 2+ pulses  Abdomen: soft, non-tender, non-distended, no organomegaly  : Left CVA tenderness  MSK: no edema, no deformities, normal ROM  Skin: skin color, texture, turgor normal.  Neurologic: no focal deficits, sensation intact    Labs:    Urine dipstick today - negative for LE, nitrites    Lab Results   Component Value Date    WBC 5.81 10/06/2021    HGB 14.1 10/06/2021    HCT 44.2 10/06/2021    MCV 93 10/06/2021     10/06/2021           BMP  Lab Results   Component Value Date     10/06/2021    K 4.7 10/06/2021     10/06/2021    CO2 23 10/06/2021    BUN 23 10/06/2021    CREATININE 2.2 (H) 02/15/2022    CALCIUM 10.2 02/15/2022    ANIONGAP 15 10/06/2021    ESTGFRAFRICA 34.6 (A) 02/15/2022    EGFRNONAA 29.9 (A) 02/15/2022       Lab Results   Component Value Date    PSA 1.2 10/06/2021    PSA 0.60 10/17/2020       Imaging:  As above    Assessment: Lukasz Person is a 66 y.o. male with left ureteral stone and ABDI    Plan:     1. To OR on 2/17 for left ureteral stent vs ureteroscopy    Mina Pride MD

## 2022-02-17 NOTE — PROGRESS NOTES
CHIEF COMPLAINT:    Mr. Person is a 66 y.o. male presenting with hypogonadism.    PRESENTING ILLNESS:    Lukasz Person is a 66 y.o. male who c/o hypogonadism.  Has been on TRT in the past and has more energy with TRT.  Currently on androgel 1.62% using 4 pumps.  Due to follow up for that in April.    He has ED.  Has an Rx for NTG.  He's still talking ICI over with his wife.    He has LUTS.  Nocturia x 1.  Is pleased with how he voids.    He presents today with nephrolithiasis.  Had L flank pain 4 days ago.  Subjective F/C.  + N/V.  Underwent a CT RSS on 2/15/22 showing a L mid ureteral stone ~ 1cm in size.   Bilateral non-obstructing stones.  I reviewed this independently.  He has been straining his urine.    REVIEW OF SYSTEMS:    Lukasz Person denies headache, blurred vision, fever, nausea, vomiting, chills, abdominal pain, chest pain, sore throat, bleeding per rectum, cough, SOB, recent loss of consciousness, recent mental status changes, seizures, dizziness, or upper or lower extremity weakness.    LOLA  1. 1  2. 0  3. 0  4. 0  5. 0       PATIENT HISTORY:    Past Medical History:   Diagnosis Date    Diabetes mellitus     Onset late 50s/early 60s    Hyperlipidemia     Hypertension     Onset late 50s/early 60s    Sleep apnea     since 2006       Past Surgical History:   Procedure Laterality Date    CORONARY ANGIOGRAPHY N/A 9/30/2020    Procedure: ANGIOGRAM, CORONARY ARTERY;  Surgeon: John West MD;  Location: [facility] CATH LAB;  Service: Cardiology;  Laterality: N/A;    LEFT HEART CATHETERIZATION Left 9/30/2020    Procedure: Left heart cath;  Surgeon: John West MD;  Location: [facility] CATH LAB;  Service: Cardiology;  Laterality: Left;    LITHOTRIPSY      PARATHYROIDECTOMY  1/1/2-107       Family History   Problem Relation Age of Onset    Heart disease Father 70        CABG    Arthritis Father     Diabetes Father     Kidney disease Father         had one kidney removed in early thirties     Arthritis Mother     Colon cancer Brother 32    Diabetes Paternal Grandmother     Colon polyps Paternal Grandfather     Colon cancer Paternal Grandfather     Cancer Paternal Grandfather         colon cancer at age 62    Alcohol abuse Paternal Aunt     Arthritis Sister     Arthritis Sister     Arthritis Brother     Cancer Brother         colon cancer at age 32    Cancer Maternal Grandfather         throat cancer    Hypertension Sister        Social History     Socioeconomic History    Marital status:    Tobacco Use    Smoking status: Former Smoker     Packs/day: 1.00     Years: 7.00     Pack years: 7.00     Types: Cigarettes, Cigars     Start date: 1972     Quit date:      Years since quittin.7    Smokeless tobacco: Never Used    Tobacco comment: smoking was off and on.  cumulative 7 years.  never more than three years in one stretch or more lj   Substance and Sexual Activity    Alcohol use: Yes     Alcohol/week: 3.0 standard drinks     Types: 2 Cans of beer, 1 Shots of liquor per week     Comment: don't drink regularly.  when I do, three beers or shots    Drug use: Not Currently     Types: Marijuana    Sexual activity: Not Currently     Partners: Female     Birth control/protection: None     Comment:      Social Determinants of Health     Financial Resource Strain: Low Risk     Difficulty of Paying Living Expenses: Not very hard   Food Insecurity: No Food Insecurity    Worried About Running Out of Food in the Last Year: Never true    Ran Out of Food in the Last Year: Never true   Transportation Needs: No Transportation Needs    Lack of Transportation (Medical): No    Lack of Transportation (Non-Medical): No   Physical Activity: Insufficiently Active    Days of Exercise per Week: 4 days    Minutes of Exercise per Session: 30 min   Stress: No Stress Concern Present    Feeling of Stress : Only a little   Social Connections: Unknown    Frequency of Communication  with Friends and Family: Once a week    Frequency of Social Gatherings with Friends and Family: Once a week    Active Member of Clubs or Organizations: No    Attends Club or Organization Meetings: Never    Marital Status:    Housing Stability: Low Risk     Unable to Pay for Housing in the Last Year: No    Number of Places Lived in the Last Year: 1    Unstable Housing in the Last Year: No       Allergies:  Patient has no known allergies.    Medications:    Current Outpatient Medications:     ACCU-CHEK SOFTCLIX LANCETS Misc, USE EVERY DAY, Disp: 100 each, Rfl: 6    allopurinoL (ZYLOPRIM) 100 MG tablet, TAKE 1 TABLET BY MOUTH EVERY DAY, Disp: 30 tablet, Rfl: 10    blood sugar diagnostic (ACCU-CHEK ANTONELLA PLUS TEST STRP) Strp, Inject 1 strip into the skin 2 (two) times daily before meals., Disp: 100 strip, Rfl: 11    blood-glucose meter kit, Checks blood sugars 1x/daily., Disp: 1 each, Rfl: 12    fenofibrate 160 MG Tab, TAKE 1 TABLET(160 MG) BY MOUTH EVERY DAY, Disp: 30 tablet, Rfl: 10    glimepiride (AMARYL) 2 MG tablet, TAKE 2 TABLETS BY MOUTH EVERY MORNING, Disp: 60 tablet, Rfl: 5    ketorolac (TORADOL) 10 mg tablet, Take 1 tablet (10 mg total) by mouth every 6 (six) hours as needed for Pain., Disp: 20 tablet, Rfl: 1    lisinopriL (PRINIVIL,ZESTRIL) 20 MG tablet, Take 1 tablet (20 mg total) by mouth once daily., Disp: 90 tablet, Rfl: 3    lisinopriL-hydrochlorothiazide (PRINZIDE,ZESTORETIC) 20-25 mg Tab, TAKE 1 TABLET BY MOUTH EVERY DAY, Disp: 90 tablet, Rfl: 1    metFORMIN (GLUCOPHAGE) 500 MG tablet, TAKE 2 TABLETS BY MOUTH EVERY MORNING AND 2 TABLETS WITH DINNER, Disp: 120 tablet, Rfl: 10    nitroGLYCERIN (NITROSTAT) 0.4 MG SL tablet, Place 1 tablet (0.4 mg total) under the tongue every 5 (five) minutes as needed for Chest pain. If you need a third tablet, call 911, Disp: 60 tablet, Rfl: 12    ondansetron (ZOFRAN) 4 MG tablet, Take 1 tablet (4 mg total) by mouth every 8 (eight) hours as  needed for Nausea., Disp: 16 tablet, Rfl: 1    potassium citrate (UROCIT-K) 10 mEq (1,080 mg) TbSR, TAKE 1 TABLET BY MOUTH TWICE DAILY, Disp: 60 tablet, Rfl: 9    rosuvastatin (CRESTOR) 20 MG tablet, TAKE 1 TABLET(20 MG) BY MOUTH EVERY DAY, Disp: 30 tablet, Rfl: 11    tamsulosin (FLOMAX) 0.4 mg Cap, Take 1 capsule (0.4 mg total) by mouth once daily., Disp: 30 capsule, Rfl: 2    testosterone (ANDROGEL) 20.25 mg/1.25 gram (1.62 %) GlPm, Apply 2 pumps to shoulders daily, Disp: 1 each, Rfl: 5    PHYSICAL EXAMINATION:    The patient generally appears in good health, is appropriately interactive, and is in no apparent distress.     Eyes: anicteric sclerae, moist conjunctivae; no lid-lag; PERRLA     HENT: Atraumatic; oropharynx clear with moist mucous membranes and no mucosal ulcerations;normal hard and soft palate.  No evidence of lymphadenopathy.    Neck: Trachea midline.  No thyromegaly.    Skin: No lesions.    Mental: Cooperative with normal affect.  Is oriented to time, place, and person.    Neuro: Grossly intact.    Chest: Normal inspiratory effort.   No accessory muscles.  No audible wheezes.  Respirations symmetric on inspiration and expiration.    Heart: Regular rhythm.      Abdomen:  Soft, non-tender. No masses or organomegaly. Bladder is not palpable. No evidence of flank discomfort. No evidence of inguinal hernia.    Genitourinary: The penis has no evidence of plaques or induration. The urethral meatus is normal. The testes, epididymides, and cord structures are normal in size and contour bilaterally. The scrotum is normal in size and contour.    Normal anal sphincter tone. No rectal mass.    The prostate is 35 g. Normal landmarks. Lateral sulci. Median furrow intact.  No nodularity or induration. Seminal vesicles are normal.    Extremities: No clubbing, cyanosis, or edema      LABS:      Lab Results   Component Value Date    PSA 1.2 10/06/2021    PSA 0.60 10/17/2020       IMPRESSION:    Encounter Diagnoses    Name Primary?    Nephrolithiasis Yes    Male hypogonadism     Erectile dysfunction due to arterial insufficiency     BPH with urinary obstruction     DM type 2 without retinopathy          PLAN:    1.  He can then RTC April with T, PSA, CBC, hepatic panel, lipid panel. Continue Androgel 1.62%.  2. Discussed options for his ED (affected by above comorbidities)..  He'd like to discuss ICI with his wife.  3. Will observe his LUTS as they don't bother him.  4. Discussed that he has a L mid ureteral stone with an ABDI.  Recommend observation, hydration and JJ stent vs URS.  Will discuss with the on call physician.      Copy to:

## 2022-02-18 ENCOUNTER — TELEPHONE (OUTPATIENT)
Dept: UROLOGY | Facility: CLINIC | Age: 67
End: 2022-02-18
Payer: MEDICARE

## 2022-02-18 NOTE — PROGRESS NOTES
Patient underwent a cystoscopy with left ureteral stent placement today.  I just ordered a BMP for Monday February 21, 2022. Please arrange with the patient to ensure that his renal function improves.    I also placed a case request for definitive treatment of the stone with left ureteroscopy on February 25, 2022. Miracle, please arrange covid test and instructions, etc.

## 2022-02-18 NOTE — OP NOTE
[facility]  Operative Note    Date: 02/17/2022    Pre-Op Diagnosis: Left nephrolithiasis    Post-Op Diagnosis: same    Procedure(s) Performed:   1.  Cystoscopy with left JJ stent placement  2.  Fluoroscopy < 1 hr  3.  Intraoperative independent interpretation of fluoroscopic images    Specimen(s): urine for culture     Surgeon: Lukasz Hughes     Assistant: Justine Dominguez MD    Anesthesia: Monitored Local Anesthesia with Sedation    Indications: Lukasz Person is a 66 y.o. male with a left distal ureteral stone and left renal stone.  He presented to Dr. Álvarez's clinic and was found to have this stone as well as an acute kidney injury with elevated serum in.  Therefore he was direct admitted to the hospital for urgent intervention for his left ureteral stone.    Findings:   1.  Purulent debris noted upon passage of wire---therefore, ureteroscopy was not performed and a ureteral stent was placed.    Estimated Blood Loss: min    Drains:   1.  6 x 26 left JJ ureteral stent without strings    Procedure in Detail:  After risks, benefits and possible complications of the procedure were explained, the patient elected to undergo the procedure and informed consent was obtained. All questions were answered in the alejandro-operative area. The patient was transferred to the cystoscopy suite and placed on the fluoroscopy table in the supine position.  SCDs were applied and working. Time out was performed, alejandro-procedural antibiotics were given. MAC anesthesia was administered.  After adequate anesthesia the patient was placed in dorsal lithotomy position and prepped and draped in the usual sterile fashion.     A rigid cystoscope in a 22 Fr sheath was introduced into the patients bladder per urethra. This passed easily.  The entire urethra was visualized and revealed no strictures or masses.  Formal cystoscopy was performed which showed the right and left ureteral orifices in the normal anatomic position.  There were  no bladder tumors, no trabeculations, and no stones.      Our attention was turned to the patients left ureteral orifice.  A motion wire was passed alongside the stone to the level of the expected renal pelvis.  This was confirmed using fluoro.  At this point, purulent-looking urine efflux was noted.  A urine culture was obtained.  The decision was made to not perform ureteroscopy at this point.    We then passed a 6 x 26 JJ ureteral stent without strings over the wire to the level of the renal pelvis under direct vision as well as flouroscopy. The guide wire was removed.  A 180 degree coil was observed in the renal pelvis as well as the bladder using fluoro.  A 180 degree coil was also seen using direct visualization in the bladder.      The patient tolerated the procedure well and was transferred to the recovery room in stable condition.      Follow up care: The patient will be admitted back to the floor but will likely be discharged from the hospital later today if he does not spike any fevers.  He will be discharged on empiric antibiotics we will plan for a BMP on Monday February 21st to ensure his renal function has improved.  We will also plan on a ureteroscopy likely next week.  We will f/u on the urine culture results.       Lukasz Hughes MD

## 2022-02-18 NOTE — TRANSFER OF CARE
Anesthesia Transfer of Care Note    Patient: Lukasz Person    Procedure(s) Performed: Procedure(s) (LRB):  CYSTOSCOPY (N/A)  PLACEMENT-STENT (Left)    Patient location: PACU    Anesthesia Type: general    Transport from OR: Transported from OR on 6-10 L/min O2 by face mask with adequate spontaneous ventilation    Post pain: adequate analgesia    Post assessment: no apparent anesthetic complications and tolerated procedure well    Post vital signs: stable    Level of consciousness: awake, alert and oriented    Nausea/Vomiting: no nausea/vomiting    Complications: none    Transfer of care protocol was followed      Last vitals:   Visit Vitals  BP (!) 167/77 (BP Location: Right arm, Patient Position: Lying)   Pulse 69   Temp 37.7 °C (99.8 °F) (Temporal)   Resp 16   Wt 115.5 kg (254 lb 10.1 oz)   SpO2 98%   BMI 34.53 kg/m²

## 2022-02-18 NOTE — DISCHARGE SUMMARY
[facility]  Discharge Note  Short Stay    Admit Date: 2/17/2022    Discharge Date and Time: 02/18/2022 9:54 AM      Attending Physician: No att. providers found     Discharge Provider: Justine Dominguez MD    Diagnoses:  Active Hospital Problems    Diagnosis  POA    *Nephrolithiasis [N20.0]  Yes      Resolved Hospital Problems   No resolved problems to display.       Discharged Condition: stable    Hospital Course: Patient was admitted for left stent placement and tolerated the procedure well with no complications. He was discharged home in good condition on the same day.       Final Diagnoses: Same as principal problem.    Disposition: Home or Self Care    Follow up/Patient Instructions:    Medications:  Reconciled Home Medications:   Discharge Medication List as of 2/17/2022  8:38 PM      START taking these medications    Details   amoxicillin-clavulanate 875-125mg (AUGMENTIN) 875-125 mg per tablet Take 1 tablet by mouth every 12 (twelve) hours. for 7 days, Starting Thu 2/17/2022, Until Thu 2/24/2022, Normal      oxybutynin (DITROPAN) 5 MG Tab Take 1 tablet (5 mg total) by mouth 3 (three) times daily as needed (bladder spasms)., Starting Thu 2/17/2022, Until Sun 2/27/2022 at 2359, Normal      oxyCODONE-acetaminophen (PERCOCET) 5-325 mg per tablet Take 1 tablet by mouth every 4 (four) hours as needed., Starting Thu 2/17/2022, Normal         CONTINUE these medications which have NOT CHANGED    Details   glimepiride (AMARYL) 2 MG tablet TAKE 2 TABLETS BY MOUTH EVERY MORNING, Normal      lisinopriL (PRINIVIL,ZESTRIL) 20 MG tablet Take 1 tablet (20 mg total) by mouth once daily., Starting Tue 10/12/2021, Until Wed 10/12/2022, Normal      lisinopriL-hydrochlorothiazide (PRINZIDE,ZESTORETIC) 20-25 mg Tab TAKE 1 TABLET BY MOUTH EVERY DAY, Normal      potassium citrate (UROCIT-K) 10 mEq (1,080 mg) TbSR TAKE 1 TABLET BY MOUTH TWICE DAILY, Normal      ACCU-CHEK SOFTCLIX LANCETS Misc USE EVERY DAY, Normal       allopurinoL (ZYLOPRIM) 100 MG tablet TAKE 1 TABLET BY MOUTH EVERY DAY, Normal      blood sugar diagnostic (ACCU-CHEK ANTONELLA PLUS TEST STRP) Strp Inject 1 strip into the skin 2 (two) times daily before meals., Starting Mon 12/13/2021, Normal      blood-glucose meter kit Checks blood sugars 1x/daily., Normal      fenofibrate 160 MG Tab TAKE 1 TABLET(160 MG) BY MOUTH EVERY DAY, Normal      ketorolac (TORADOL) 10 mg tablet Take 1 tablet (10 mg total) by mouth every 6 (six) hours as needed for Pain., Starting Tue 2/15/2022, Until Mon 3/7/2022 at 2359, Normal      metFORMIN (GLUCOPHAGE) 500 MG tablet TAKE 2 TABLETS BY MOUTH EVERY MORNING AND 2 TABLETS WITH DINNER, Normal      nitroGLYCERIN (NITROSTAT) 0.4 MG SL tablet Place 1 tablet (0.4 mg total) under the tongue every 5 (five) minutes as needed for Chest pain. If you need a third tablet, call 911, Starting Tue 12/22/2020, Normal      ondansetron (ZOFRAN) 4 MG tablet Take 1 tablet (4 mg total) by mouth every 8 (eight) hours as needed for Nausea., Starting Tue 2/15/2022, Until Tue 2/22/2022 at 2359, Normal      rosuvastatin (CRESTOR) 20 MG tablet TAKE 1 TABLET(20 MG) BY MOUTH EVERY DAY, Normal      tamsulosin (FLOMAX) 0.4 mg Cap Take 1 capsule (0.4 mg total) by mouth once daily., Starting Tue 2/15/2022, Until Thu 3/17/2022, Normal      testosterone (ANDROGEL) 20.25 mg/1.25 gram (1.62 %) GlPm Apply 2 pumps to shoulders daily, Print           Discharge Procedure Orders   Notify your health care provider if you experience any of the following:  severe uncontrolled pain     Notify your health care provider if you experience any of the following:  persistent nausea and vomiting or diarrhea     Notify your health care provider if you experience any of the following:  temperature >100.4     Activity as tolerated

## 2022-02-18 NOTE — NURSING TRANSFER
Nursing Transfer Note      2/17/2022         Transfer To: 553    Transfer via stretcher    Transfer with n/a    Transported by pct    Medicines sent: n/a        Chart send with patient: Yes    Notified: spouse    Patient reassessed at: 2/17/2022 @ 2000

## 2022-02-18 NOTE — NURSING
Pt d/chome per MD order. Pt verbalized understanding d/c instructions.Pt prescription sent to [facility]. S. Afebrile. IV removed and catheter tip intact. Pt will leave escorted by family and staff.

## 2022-02-18 NOTE — PATIENT INSTRUCTIONS
Post Cystoscopy Instructions  Do not strain to have a bowel movement  No strenuous exercise x 7 days  No driving while you are on narcotic pain medications or if your gongora  catheter is in place    You can expect:  To pass stone fragments if you had a stone procedure  Have pain when you void from your stent if you have a stent in place  See blood in your urine if you have a stent in place    If you have a catheter, please return to the ER if your catheter stops draining or you are having abdominal pain.    Call the doctor if:   Temperature is greater than 101F   Persistent vomiting and inability to keep food down   Inability to void if you do not have a catheter    You have a stent in place. This will stay in place until your stone surgery is performed. We will schedule that soon hopefully in 1-2 weeks.   You will have a repeat blood test on Monday to make sure your kidney function is better.

## 2022-02-18 NOTE — ANESTHESIA POSTPROCEDURE EVALUATION
Anesthesia Post Evaluation    Patient: Lukasz Person    Procedure(s) Performed: Procedure(s) (LRB):  CYSTOSCOPY (N/A)  PLACEMENT-STENT (Left)    Final Anesthesia Type: general      Patient location during evaluation: PACU  Patient participation: Yes- Able to Participate  Level of consciousness: awake and alert and oriented  Post-procedure vital signs: reviewed and stable  Pain management: adequate  Airway patency: patent    PONV status at discharge: No PONV  Anesthetic complications: no      Cardiovascular status: hemodynamically stable  Respiratory status: nasal cannula  Hydration status: euvolemic  Follow-up not needed.          Vitals Value Taken Time   /66 02/17/22 2044   Temp 37.1 °C (98.7 °F) 02/17/22 2044   Pulse 64 02/17/22 2044   Resp 16 02/17/22 2044   SpO2 94 % 02/17/22 2044         Event Time   Out of Recovery 19:30:00         Pain/Jeovanny Score: Jeovanny Score: 9 (2/17/2022  7:15 PM)

## 2022-02-18 NOTE — TELEPHONE ENCOUNTER
I spoke with patient and scheduled his bmp for this coming Monday and advised him that [facility] will be calling him to let him know the details of his surgery on 2-25-22 and getting his covid test done prior. Patient agreed.

## 2022-02-18 NOTE — PROGRESS NOTES
Pt safety check done. VSS. Consents and 2 pt identifier done. 20G IV started in left wrist. Wife has personal belongings.

## 2022-02-18 NOTE — ANESTHESIA PROCEDURE NOTES
Intubation    Date/Time: 2/17/2022 6:17 PM  Performed by: Sudha Lyn CRNA  Authorized by: Beth Owens MD     Intubation:     Induction:  Intravenous    Intubated:  Postinduction    Mask Ventilation:  Easy with oral airway    Attempts:  1    Attempted By:  CRNA    Method of Intubation:  Direct    Blade:  Lucero 2    Laryngeal View Grade: Grade IIA - cords partially seen      Difficult Airway Encountered?: No      Complications:  None    Airway Device:  Oral endotracheal tube    Airway Device Size:  8.0    Style/Cuff Inflation:  Cuffed (inflated to minimal occlusive pressure)    Tube secured:  23    Secured at:  The teeth    Placement Verified By:  Capnometry    Complicating Factors:  Obesity    Findings Post-Intubation:  BS equal bilateral and atraumatic/condition of teeth unchanged

## 2022-02-19 LAB — BACTERIA UR CULT: NO GROWTH

## 2022-02-21 ENCOUNTER — TELEPHONE (OUTPATIENT)
Dept: UROLOGY | Facility: CLINIC | Age: 67
End: 2022-02-21
Payer: MEDICARE

## 2022-02-21 ENCOUNTER — LAB VISIT (OUTPATIENT)
Dept: LAB | Facility: HOSPITAL | Age: 67
End: 2022-02-21
Attending: INTERNAL MEDICINE
Payer: MEDICARE

## 2022-02-21 DIAGNOSIS — N20.0 NEPHROLITHIASIS: ICD-10-CM

## 2022-02-21 LAB
ANION GAP SERPL CALC-SCNC: 12 MMOL/L (ref 8–16)
BUN SERPL-MCNC: 26 MG/DL (ref 8–23)
CALCIUM SERPL-MCNC: 10 MG/DL (ref 8.7–10.5)
CHLORIDE SERPL-SCNC: 100 MMOL/L (ref 95–110)
CO2 SERPL-SCNC: 25 MMOL/L (ref 23–29)
CREAT SERPL-MCNC: 1.3 MG/DL (ref 0.5–1.4)
EST. GFR  (AFRICAN AMERICAN): >60 ML/MIN/1.73 M^2
EST. GFR  (NON AFRICAN AMERICAN): 56.5 ML/MIN/1.73 M^2
GLUCOSE SERPL-MCNC: 144 MG/DL (ref 70–110)
POTASSIUM SERPL-SCNC: 4.5 MMOL/L (ref 3.5–5.1)
SODIUM SERPL-SCNC: 137 MMOL/L (ref 136–145)

## 2022-02-21 PROCEDURE — 80048 BASIC METABOLIC PNL TOTAL CA: CPT | Performed by: UROLOGY

## 2022-02-21 PROCEDURE — 36415 COLL VENOUS BLD VENIPUNCTURE: CPT | Performed by: UROLOGY

## 2022-02-21 NOTE — TELEPHONE ENCOUNTER
1411-I LVM for pt that dental crown was okay. Also will ask for RX to be changed at his pharmacy per below.    ----- Message from Saritha Nguyen sent at 2/21/2022  1:31 PM CST -----  Regarding: speak with nurse  Contact: patient  [phone]  please call above patient have question for the nurse concerning his procedure said on this Wednesday he have a dental appointment for a crown will this affect procedure on Friday with the doctor also said he was prescribed oxycodone this does not agree with him makes him sick would like hydrocodone called into the pharmacy waiting on a call back from the the nurse thanks.

## 2022-02-22 RX ORDER — GLIMEPIRIDE 2 MG/1
TABLET ORAL
Qty: 60 TABLET | Refills: 9 | Status: SHIPPED | OUTPATIENT
Start: 2022-02-22 | End: 2022-11-15

## 2022-02-23 RX ORDER — HYDROCODONE BITARTRATE AND ACETAMINOPHEN 5; 325 MG/1; MG/1
1 TABLET ORAL EVERY 6 HOURS PRN
Qty: 12 TABLET | Refills: 0 | Status: SHIPPED | OUTPATIENT
Start: 2022-02-23 | End: 2022-02-28

## 2022-02-24 ENCOUNTER — TELEPHONE (OUTPATIENT)
Dept: UROLOGY | Facility: CLINIC | Age: 67
End: 2022-02-24
Payer: MEDICARE

## 2022-02-24 NOTE — PRE-PROCEDURE INSTRUCTIONS
PREOP INSTRUCTIONS:Nothing to eat or drink for 8 hours before surgery.Instructed to follow the surgeon's instructions if they differ from these.Shower instructions as well as directions to the [facility] were given.Encouraged to wear loose fitting,comfortable clothing.Medication instructions for pm prior to and am of procedure reviewed.Instructed to avoid taking vitamins,supplements,aspirin and ibuprofen the morning of surgery.    Patient denies any side effects or issues with anesthesia or sedation.     Patient does not know arrival time.Explained that this information comes from the surgeon's office and if they haven't heard from them by 2 or 3 pm to call the office.Patient stated an understanding.

## 2022-02-24 NOTE — TELEPHONE ENCOUNTER
Called pt to confirm arrival time of 830am for procedure on 2/25/2022. Gave pt NPO instructions and gave pt opportunity to ask questions. Pt verbalized understanding.     Pt was informed that only 1 person would be allowed to accompany them the morning of surgery.  Pt verbalized understanding.

## 2022-02-25 ENCOUNTER — TELEPHONE (OUTPATIENT)
Dept: UROLOGY | Facility: CLINIC | Age: 67
End: 2022-02-25
Payer: MEDICARE

## 2022-02-25 ENCOUNTER — ANESTHESIA EVENT (OUTPATIENT)
Dept: SURGERY | Facility: HOSPITAL | Age: 67
End: 2022-02-25
Payer: MEDICARE

## 2022-02-25 ENCOUNTER — HOSPITAL ENCOUNTER (OUTPATIENT)
Facility: HOSPITAL | Age: 67
Discharge: HOME | End: 2022-02-25
Attending: UROLOGY | Admitting: UROLOGY
Payer: MEDICARE

## 2022-02-25 ENCOUNTER — ANESTHESIA (OUTPATIENT)
Dept: SURGERY | Facility: HOSPITAL | Age: 67
End: 2022-02-25
Payer: MEDICARE

## 2022-02-25 VITALS
TEMPERATURE: 99 F | DIASTOLIC BLOOD PRESSURE: 70 MMHG | WEIGHT: 252 LBS | OXYGEN SATURATION: 97 % | HEART RATE: 57 BPM | BODY MASS INDEX: 34.18 KG/M2 | SYSTOLIC BLOOD PRESSURE: 116 MMHG | RESPIRATION RATE: 20 BRPM

## 2022-02-25 DIAGNOSIS — N20.0 NEPHROLITHIASIS: Primary | ICD-10-CM

## 2022-02-25 DIAGNOSIS — N20.0 KIDNEY STONE: ICD-10-CM

## 2022-02-25 LAB
CTP QC/QA: YES
POCT GLUCOSE: 126 MG/DL (ref 70–110)
POCT GLUCOSE: 143 MG/DL (ref 70–110)
SARS-COV-2 AG RESP QL IA.RAPID: NEGATIVE

## 2022-02-25 PROCEDURE — 27201423 OPTIME MED/SURG SUP & DEVICES STERILE SUPPLY: Performed by: UROLOGY

## 2022-02-25 PROCEDURE — 25000003 PHARM REV CODE 250: Performed by: STUDENT IN AN ORGANIZED HEALTH CARE EDUCATION/TRAINING PROGRAM

## 2022-02-25 PROCEDURE — 63600175 PHARM REV CODE 636 W HCPCS: Performed by: NURSE ANESTHETIST, CERTIFIED REGISTERED

## 2022-02-25 PROCEDURE — 52356 PR CYSTO/URETERO W/LITHOTRIPSY: ICD-10-PCS | Mod: LT,,, | Performed by: UROLOGY

## 2022-02-25 PROCEDURE — 52356 CYSTO/URETERO W/LITHOTRIPSY: CPT | Mod: LT,,, | Performed by: UROLOGY

## 2022-02-25 PROCEDURE — 25000003 PHARM REV CODE 250: Performed by: NURSE ANESTHETIST, CERTIFIED REGISTERED

## 2022-02-25 PROCEDURE — 71000016 HC POSTOP RECOV ADDL HR: Performed by: UROLOGY

## 2022-02-25 PROCEDURE — 82365 CALCULUS SPECTROSCOPY: CPT | Performed by: UROLOGY

## 2022-02-25 PROCEDURE — C2617 STENT, NON-COR, TEM W/O DEL: HCPCS | Performed by: UROLOGY

## 2022-02-25 PROCEDURE — 36000707: Performed by: UROLOGY

## 2022-02-25 PROCEDURE — 37000009 HC ANESTHESIA EA ADD 15 MINS: Performed by: UROLOGY

## 2022-02-25 PROCEDURE — D9220A PRA ANESTHESIA: ICD-10-PCS | Mod: ANES,,, | Performed by: STUDENT IN AN ORGANIZED HEALTH CARE EDUCATION/TRAINING PROGRAM

## 2022-02-25 PROCEDURE — D9220A PRA ANESTHESIA: ICD-10-PCS | Mod: CRNA,,, | Performed by: NURSE ANESTHETIST, CERTIFIED REGISTERED

## 2022-02-25 PROCEDURE — 82962 GLUCOSE BLOOD TEST: CPT | Performed by: UROLOGY

## 2022-02-25 PROCEDURE — C1758 CATHETER, URETERAL: HCPCS | Performed by: UROLOGY

## 2022-02-25 PROCEDURE — 36000706: Performed by: UROLOGY

## 2022-02-25 PROCEDURE — C1769 GUIDE WIRE: HCPCS | Performed by: UROLOGY

## 2022-02-25 PROCEDURE — D9220A PRA ANESTHESIA: Mod: ANES,,, | Performed by: STUDENT IN AN ORGANIZED HEALTH CARE EDUCATION/TRAINING PROGRAM

## 2022-02-25 PROCEDURE — 71000044 HC DOSC ROUTINE RECOVERY FIRST HOUR: Performed by: UROLOGY

## 2022-02-25 PROCEDURE — C1894 INTRO/SHEATH, NON-LASER: HCPCS | Performed by: UROLOGY

## 2022-02-25 PROCEDURE — 71000015 HC POSTOP RECOV 1ST HR: Performed by: UROLOGY

## 2022-02-25 PROCEDURE — D9220A PRA ANESTHESIA: Mod: CRNA,,, | Performed by: NURSE ANESTHETIST, CERTIFIED REGISTERED

## 2022-02-25 PROCEDURE — 25000003 PHARM REV CODE 250

## 2022-02-25 PROCEDURE — 37000008 HC ANESTHESIA 1ST 15 MINUTES: Performed by: UROLOGY

## 2022-02-25 DEVICE — STENT URETERAL UNIV 6FR 26CM
Type: IMPLANTABLE DEVICE | Site: URETER | Status: NON-FUNCTIONAL
Removed: 2022-03-16

## 2022-02-25 RX ORDER — LIDOCAINE HYDROCHLORIDE 20 MG/ML
INJECTION INTRAVENOUS
Status: DISCONTINUED | OUTPATIENT
Start: 2022-02-25 | End: 2022-02-25

## 2022-02-25 RX ORDER — HALOPERIDOL 5 MG/ML
0.5 INJECTION INTRAMUSCULAR EVERY 10 MIN PRN
Status: DISCONTINUED | OUTPATIENT
Start: 2022-02-25 | End: 2022-02-25 | Stop reason: HOSPADM

## 2022-02-25 RX ORDER — ROCURONIUM BROMIDE 10 MG/ML
INJECTION, SOLUTION INTRAVENOUS
Status: DISCONTINUED | OUTPATIENT
Start: 2022-02-25 | End: 2022-02-25

## 2022-02-25 RX ORDER — CEFAZOLIN SODIUM/D5W 2 G/100 ML
2 PLASTIC BAG, INJECTION (ML) INTRAVENOUS
Status: COMPLETED | OUTPATIENT
Start: 2022-02-25 | End: 2022-02-25

## 2022-02-25 RX ORDER — PHENYLEPHRINE HYDROCHLORIDE 10 MG/ML
INJECTION INTRAVENOUS
Status: DISCONTINUED | OUTPATIENT
Start: 2022-02-25 | End: 2022-02-25

## 2022-02-25 RX ORDER — FENTANYL CITRATE 50 UG/ML
25 INJECTION, SOLUTION INTRAMUSCULAR; INTRAVENOUS EVERY 5 MIN PRN
Status: DISCONTINUED | OUTPATIENT
Start: 2022-02-25 | End: 2022-02-25 | Stop reason: HOSPADM

## 2022-02-25 RX ORDER — SODIUM CHLORIDE 0.9 % (FLUSH) 0.9 %
3 SYRINGE (ML) INJECTION EVERY 4 HOURS PRN
Status: DISCONTINUED | OUTPATIENT
Start: 2022-02-25 | End: 2022-02-25 | Stop reason: HOSPADM

## 2022-02-25 RX ORDER — FENTANYL CITRATE 50 UG/ML
INJECTION, SOLUTION INTRAMUSCULAR; INTRAVENOUS
Status: DISCONTINUED | OUTPATIENT
Start: 2022-02-25 | End: 2022-02-25

## 2022-02-25 RX ORDER — KETOROLAC TROMETHAMINE 10 MG/1
10 TABLET, FILM COATED ORAL EVERY 6 HOURS
Qty: 12 TABLET | Refills: 0 | Status: SHIPPED | OUTPATIENT
Start: 2022-02-25 | End: 2022-03-21

## 2022-02-25 RX ORDER — DEXAMETHASONE SODIUM PHOSPHATE 4 MG/ML
INJECTION, SOLUTION INTRA-ARTICULAR; INTRALESIONAL; INTRAMUSCULAR; INTRAVENOUS; SOFT TISSUE
Status: DISCONTINUED | OUTPATIENT
Start: 2022-02-25 | End: 2022-02-25

## 2022-02-25 RX ORDER — TAMSULOSIN HYDROCHLORIDE 0.4 MG/1
0.4 CAPSULE ORAL DAILY
Qty: 30 CAPSULE | Refills: 0 | Status: SHIPPED | OUTPATIENT
Start: 2022-02-25 | End: 2022-03-17

## 2022-02-25 RX ORDER — HYDROCODONE BITARTRATE AND ACETAMINOPHEN 5; 325 MG/1; MG/1
TABLET ORAL
Status: COMPLETED
Start: 2022-02-25 | End: 2022-02-25

## 2022-02-25 RX ORDER — HYDROCODONE BITARTRATE AND ACETAMINOPHEN 5; 325 MG/1; MG/1
1 TABLET ORAL EVERY 6 HOURS PRN
Qty: 5 TABLET | Refills: 0 | Status: SHIPPED | OUTPATIENT
Start: 2022-02-25 | End: 2022-03-21

## 2022-02-25 RX ORDER — OXYBUTYNIN CHLORIDE 5 MG/1
5 TABLET ORAL 3 TIMES DAILY PRN
Qty: 30 TABLET | Refills: 0 | Status: SHIPPED | OUTPATIENT
Start: 2022-02-25 | End: 2022-03-21

## 2022-02-25 RX ORDER — PROPOFOL 10 MG/ML
VIAL (ML) INTRAVENOUS
Status: DISCONTINUED | OUTPATIENT
Start: 2022-02-25 | End: 2022-02-25

## 2022-02-25 RX ORDER — HYDROCODONE BITARTRATE AND ACETAMINOPHEN 5; 325 MG/1; MG/1
1 TABLET ORAL EVERY 6 HOURS PRN
Status: DISCONTINUED | OUTPATIENT
Start: 2022-02-25 | End: 2022-02-25 | Stop reason: HOSPADM

## 2022-02-25 RX ORDER — ONDANSETRON 2 MG/ML
INJECTION INTRAMUSCULAR; INTRAVENOUS
Status: DISCONTINUED | OUTPATIENT
Start: 2022-02-25 | End: 2022-02-25

## 2022-02-25 RX ORDER — EPHEDRINE SULFATE 50 MG/ML
INJECTION, SOLUTION INTRAVENOUS
Status: DISCONTINUED | OUTPATIENT
Start: 2022-02-25 | End: 2022-02-25

## 2022-02-25 RX ORDER — MIDAZOLAM HYDROCHLORIDE 1 MG/ML
INJECTION, SOLUTION INTRAMUSCULAR; INTRAVENOUS
Status: DISCONTINUED | OUTPATIENT
Start: 2022-02-25 | End: 2022-02-25

## 2022-02-25 RX ORDER — PHENAZOPYRIDINE HYDROCHLORIDE 200 MG/1
200 TABLET, FILM COATED ORAL 3 TIMES DAILY PRN
Qty: 15 TABLET | Refills: 0 | Status: SHIPPED | OUTPATIENT
Start: 2022-02-25 | End: 2022-03-21

## 2022-02-25 RX ADMIN — SODIUM CHLORIDE, SODIUM GLUCONATE, SODIUM ACETATE, POTASSIUM CHLORIDE, MAGNESIUM CHLORIDE, SODIUM PHOSPHATE, DIBASIC, AND POTASSIUM PHOSPHATE: .53; .5; .37; .037; .03; .012; .00082 INJECTION, SOLUTION INTRAVENOUS at 10:02

## 2022-02-25 RX ADMIN — HYDROCODONE BITARTRATE AND ACETAMINOPHEN 1 TABLET: 5; 325 TABLET ORAL at 12:02

## 2022-02-25 RX ADMIN — LIDOCAINE HYDROCHLORIDE 100 MG: 20 INJECTION, SOLUTION INTRAVENOUS at 09:02

## 2022-02-25 RX ADMIN — MIDAZOLAM HYDROCHLORIDE 2 MG: 1 INJECTION, SOLUTION INTRAMUSCULAR; INTRAVENOUS at 09:02

## 2022-02-25 RX ADMIN — Medication 3 G: at 09:02

## 2022-02-25 RX ADMIN — EPHEDRINE SULFATE 50 MG: 50 INJECTION INTRAVENOUS at 10:02

## 2022-02-25 RX ADMIN — ONDANSETRON 4 MG: 2 INJECTION, SOLUTION INTRAMUSCULAR; INTRAVENOUS at 10:02

## 2022-02-25 RX ADMIN — SODIUM CHLORIDE: 0.9 INJECTION, SOLUTION INTRAVENOUS at 09:02

## 2022-02-25 RX ADMIN — ROCURONIUM BROMIDE 50 MG: 10 INJECTION, SOLUTION INTRAVENOUS at 09:02

## 2022-02-25 RX ADMIN — FENTANYL CITRATE 50 MCG: 50 INJECTION, SOLUTION INTRAMUSCULAR; INTRAVENOUS at 09:02

## 2022-02-25 RX ADMIN — DEXAMETHASONE SODIUM PHOSPHATE 4 MG: 4 INJECTION, SOLUTION INTRAMUSCULAR; INTRAVENOUS at 10:02

## 2022-02-25 RX ADMIN — PROPOFOL 200 MG: 10 INJECTION, EMULSION INTRAVENOUS at 09:02

## 2022-02-25 RX ADMIN — PHENYLEPHRINE HYDROCHLORIDE 200 MCG: 10 INJECTION INTRAVENOUS at 10:02

## 2022-02-25 RX ADMIN — SUGAMMADEX 200 MG: 100 INJECTION, SOLUTION INTRAVENOUS at 10:02

## 2022-02-25 RX ADMIN — FENTANYL CITRATE 50 MCG: 50 INJECTION, SOLUTION INTRAMUSCULAR; INTRAVENOUS at 11:02

## 2022-02-25 NOTE — ANESTHESIA PREPROCEDURE EVALUATION
Pre-operative evaluation for Procedure(s) (LRB):  REMOVAL, CALCULUS, URETER, URETEROSCOPIC (Left)  LITHOTRIPSY, USING LASER (Left)  CYSTOURETEROSCOPY, WITH RETROGRADE PYELOGRAM AND URETERAL STENT INSERTION (Left)    Lukasz Person is a 66 y.o. male with pmh of DM2, KUMAR (on CPAP), CKD3, CAD who presents with ureterolithiasis (s/p stent 2/217/2022). Plan for the above procedure.     PET stress 9/2020:    The relative PET images are normal showing no clinically significant regional resting or stress induced perfusion defects.    Whole heart absolute myocardial perfusion (cc/min/g) averaged 0.59 cc/min/g at rest, which is reduced, 1.73 cc/min/g at stress, which is mildly reduced, and 2.94  CFR, which is normal.    There is mild thinning of the mid and distal inferior wall with preserved flow capacity.  These findings are consistent with non-obstructive RCA disease or possible RCA  with excellent collaterals.    Gated perfusion images showed an ejection fraction of 57% at rest and 79% during stress. Normal ejection fraction is greater than 51%.    Wall motion was normal at rest and during stress.    LV cavity size is normal at rest and stress.    The EKG portion of this study is negative for ischemia.    Arrhythmias during stress: occasional PVCs.    The patient reported no chest pain during the stress test.    There are no prior studies for comparison.    CATH 9/2020:  · LVEDP (Pre): 10  · 50% PDA stenosis iFR=0.98  · Estimated blood loss: <50     Patient Active Problem List   Diagnosis    Type 2 diabetes mellitus with kidney complication, without long-term current use of insulin    KUMAR on CPAP    Gout    Hypertriglyceridemia    Hyperparathyroidism, primary    Hypertension associated with diabetes    Hyperlipidemia associated with type 2 diabetes mellitus    Severe obesity (BMI 35.0-35.9 with comorbidity)     Obesity, diabetes, and hypertension syndrome    DM type 2 without retinopathy    Diabetes mellitus with cataract    Male hypogonadism    Stage 3a chronic kidney disease    Coronary artery disease involving native coronary artery of native heart    Low serum alkaline phosphatase    BPH with urinary obstruction    Erectile dysfunction due to arterial insufficiency    Chronic left shoulder pain    Nephrolithiasis        No current facility-administered medications on file prior to encounter.     Current Outpatient Medications on File Prior to Encounter   Medication Sig Dispense Refill    allopurinoL (ZYLOPRIM) 100 MG tablet TAKE 1 TABLET BY MOUTH EVERY DAY 30 tablet 10    fenofibrate 160 MG Tab TAKE 1 TABLET(160 MG) BY MOUTH EVERY DAY (Patient taking differently: Take by mouth every evening.) 30 tablet 10    lisinopriL (PRINIVIL,ZESTRIL) 20 MG tablet Take 1 tablet (20 mg total) by mouth once daily. 90 tablet 3    lisinopriL-hydrochlorothiazide (PRINZIDE,ZESTORETIC) 20-25 mg Tab TAKE 1 TABLET BY MOUTH EVERY DAY 90 tablet 1    metFORMIN (GLUCOPHAGE) 500 MG tablet TAKE 2 TABLETS BY MOUTH EVERY MORNING AND 2 TABLETS WITH DINNER 120 tablet 10    potassium citrate (UROCIT-K) 10 mEq (1,080 mg) TbSR TAKE 1 TABLET BY MOUTH TWICE DAILY 60 tablet 9    rosuvastatin (CRESTOR) 20 MG tablet TAKE 1 TABLET(20 MG) BY MOUTH EVERY DAY (Patient taking differently: Take by mouth every evening.) 30 tablet 11    ACCU-CHEK SOFTCLIX LANCETS Misc USE EVERY  each 6    blood sugar diagnostic (ACCU-CHEK ANTONELLA PLUS TEST STRP) Strp Inject 1 strip into the skin 2 (two) times daily before meals. 100 strip 11    blood-glucose meter kit Checks blood sugars 1x/daily. 1 each 12    ketorolac (TORADOL) 10 mg tablet Take 1 tablet (10 mg total) by mouth every 6 (six) hours as needed for Pain. 20 tablet 1    nitroGLYCERIN (NITROSTAT) 0.4 MG SL tablet Place 1 tablet (0.4 mg total) under the tongue every 5 (five) minutes as needed  for Chest pain. If you need a third tablet, call 911 60 tablet 12    oxybutynin (DITROPAN) 5 MG Tab Take 1 tablet (5 mg total) by mouth 3 (three) times daily as needed (bladder spasms). 30 tablet 0    tamsulosin (FLOMAX) 0.4 mg Cap Take 1 capsule (0.4 mg total) by mouth once daily. 30 capsule 2    testosterone (ANDROGEL) 20.25 mg/1.25 gram (1.62 %) GlPm Apply 2 pumps to shoulders daily 1 each 5       Past Surgical History:   Procedure Laterality Date    CORONARY ANGIOGRAPHY N/A 9/30/2020    Procedure: ANGIOGRAM, CORONARY ARTERY;  Surgeon: John West MD;  Location: [facility] CATH LAB;  Service: Cardiology;  Laterality: N/A;    CYSTOSCOPY N/A 2/17/2022    Procedure: CYSTOSCOPY;  Surgeon: Lukasz Hughes MD;  Location: [facility] OR [address] [place] [address];  Service: Urology;  Laterality: N/A;    LEFT HEART CATHETERIZATION Left 9/30/2020    Procedure: Left heart cath;  Surgeon: John West MD;  Location: [facility] CATH LAB;  Service: Cardiology;  Laterality: Left;    LITHOTRIPSY      PARATHYROIDECTOMY  1/1/2-107           Pre-op Assessment    I have reviewed the Patient Summary Reports.     I have reviewed the Nursing Notes.    I have reviewed the Medications.     Review of Systems  Anesthesia Hx:  No problems with previous Anesthesia  History of prior surgery of interest to airway management or planning: Denies Family Hx of Anesthesia complications.   Denies Personal Hx of Anesthesia complications.   Hematology/Oncology:  Hematology Normal   Oncology Normal     EENT/Dental:EENT/Dental Normal   Cardiovascular:   Hypertension CAD      Pulmonary:   Sleep Apnea    Renal/:   Chronic Renal Disease    Hepatic/GI:  Hepatic/GI Normal    Neurological:  Neurology Normal    Endocrine:   Diabetes, type 2  Obesity / BMI > 30      Physical Exam  General: Well nourished, Cooperative, Alert and Oriented    Airway:  Mouth Opening: Normal  TM Distance: Normal  Tongue: Normal  Neck ROM: Normal ROM    Chest/Lungs:  Clear to auscultation,  Normal Respiratory Rate    Heart:  Rate: Normal  Rhythm: Regular Rhythm  Sounds: Normal        Anesthesia Plan  Type of Anesthesia, risks & benefits discussed:    Anesthesia Type: Gen ETT  Intra-op Monitoring Plan: Standard ASA Monitors  Post Op Pain Control Plan: multimodal analgesia  ASA Score: 3  Day of Surgery Review of History & Physical: H&P Update referred to the surgeon/provider.    Ready For Surgery From Anesthesia Perspective.     .

## 2022-02-25 NOTE — TRANSFER OF CARE
Anesthesia Transfer of Care Note    Patient: Lukasz Person    Procedure(s) Performed: Procedure(s) (LRB):  REMOVAL, CALCULUS, URETER, URETEROSCOPIC (Left)  LITHOTRIPSY, USING LASER (Left)  REMOVAL, STENT, URETER (Left)  CYSTOSCOPY, WITH URETERAL STENT INSERTION (N/A)  PYELOSCOPY (Left)  URETEROSCOPY (Left)    Patient location: [facility]    Anesthesia Type: general    Transport from OR: Transported from OR on 6-10 L/min O2 by face mask with adequate spontaneous ventilation    Post pain: adequate analgesia    Post assessment: no apparent anesthetic complications and tolerated procedure well    Post vital signs: stable    Level of consciousness: awake    Nausea/Vomiting: no nausea/vomiting    Complications: none    Transfer of care protocol was followed      Last vitals:   Visit Vitals  BP (!) 142/68 (BP Location: Left arm, Patient Position: Lying)   Pulse (!) 55   Temp 37.1 °C (98.8 °F) (Temporal)   Resp 16   Wt 114.3 kg (252 lb)   SpO2 97%   BMI 34.18 kg/m²

## 2022-02-25 NOTE — ANESTHESIA POSTPROCEDURE EVALUATION
Anesthesia Post Evaluation    Patient: Lukasz Person    Procedure(s) Performed: Procedure(s) (LRB):  REMOVAL, CALCULUS, URETER, URETEROSCOPIC (Left)  LITHOTRIPSY, USING LASER (Left)  REMOVAL, STENT, URETER (Left)  CYSTOSCOPY, WITH URETERAL STENT INSERTION (N/A)  PYELOSCOPY (Left)  URETEROSCOPY (Left)    Final Anesthesia Type: general      Patient location during evaluation: PACU  Patient participation: Yes- Able to Participate  Level of consciousness: awake and alert  Post-procedure vital signs: reviewed and stable  Pain management: adequate  Airway patency: patent    PONV status at discharge: No PONV  Anesthetic complications: no      Cardiovascular status: blood pressure returned to baseline  Respiratory status: unassisted  Hydration status: euvolemic  Follow-up not needed.          Vitals Value Taken Time   /74 02/25/22 1247   Temp 37 °C (98.6 °F) 02/25/22 1108   Pulse 57 02/25/22 1249   Resp 33 02/25/22 1249   SpO2 96 % 02/25/22 1249   Vitals shown include unvalidated device data.      No case tracking events are documented in the log.      Pain/Jeovanny Score: Pain Rating Prior to Med Admin: 4 (2/25/2022 12:15 PM)  Jeovanny Score: 5 (2/25/2022 11:08 AM)

## 2022-02-25 NOTE — PROGRESS NOTES
Patient had URS today.  Needs KUB/renal US in 3 months.  Also ordering a 24 hour urine for 2 months from now.  Should have a result by the 3 month oliva.  Please set up an office visit to review imaging and 24 hour urine result in 3 months. Thank you.

## 2022-02-25 NOTE — DISCHARGE SUMMARY
[facility]  Discharge Note  Short Stay    Admit Date: 2/25/2022    Discharge Date and Time: 02/25/2022 11:03 AM      Attending Physician: Lukasz Hughes MD     Discharge Provider: Darian Nguyen MD    Diagnoses:  Active Hospital Problems    Diagnosis  POA    *Nephrolithiasis [N20.0]  Yes    BPH with urinary obstruction [N40.1, N13.8]  Yes    Erectile dysfunction due to arterial insufficiency [N52.01]  Yes    Coronary artery disease involving native coronary artery of native heart [I25.10]  Yes     Performed for abnormal PET stress and exertional chest discomfort in 9/2020:  30% mid LAD, 30% distal LAD and 50% PDA.       Stage 3a chronic kidney disease [N18.31]  Yes    DM type 2 without retinopathy [E11.9]  Yes     Chronic    Male hypogonadism [E29.1]  Yes     Chronic    Hypertension associated with diabetes [E11.59, I15.2]  Yes     Chronic    Hyperlipidemia associated with type 2 diabetes mellitus [E11.69, E78.5]  Yes     Chronic    Type 2 diabetes mellitus with kidney complication, without long-term current use of insulin [E11.29]  Yes     Chronic    KUMAR on CPAP [G47.33, Z99.89]  Not Applicable     Chronic    Gout [M10.9]  Yes     Chronic      Resolved Hospital Problems   No resolved problems to display.       Discharged Condition: good    Hospital Course: Patient was admitted for left ureteroscopy and tolerated the procedure well with no complications. The patient was discharged home in good condition on the same day.       Final Diagnoses: Same as principal problem.    Disposition: Home or Self Care    Follow up/Patient Instructions:    Medications:  Reconciled Home Medications:   Current Discharge Medication List      START taking these medications    Details   !! HYDROcodone-acetaminophen (NORCO) 5-325 mg per tablet Take 1 tablet by mouth every 6 (six) hours as needed for Pain.  Qty: 5 tablet, Refills: 0      !! ketorolac (TORADOL) 10 mg tablet Take 1 tablet (10 mg total) by mouth  every 6 (six) hours.  Qty: 12 tablet, Refills: 0      !! oxybutynin (DITROPAN) 5 MG Tab Take 1 tablet (5 mg total) by mouth 3 (three) times daily as needed (Bladder spasms).  Qty: 30 tablet, Refills: 0      phenazopyridine (PYRIDIUM) 200 MG tablet Take 1 tablet (200 mg total) by mouth 3 (three) times daily as needed (Burning with urination).  Qty: 15 tablet, Refills: 0      !! tamsulosin (FLOMAX) 0.4 mg Cap Take 1 capsule (0.4 mg total) by mouth once daily.  Qty: 30 capsule, Refills: 0       !! - Potential duplicate medications found. Please discuss with provider.      CONTINUE these medications which have NOT CHANGED    Details   allopurinoL (ZYLOPRIM) 100 MG tablet TAKE 1 TABLET BY MOUTH EVERY DAY  Qty: 30 tablet, Refills: 10      fenofibrate 160 MG Tab TAKE 1 TABLET(160 MG) BY MOUTH EVERY DAY  Qty: 30 tablet, Refills: 10      glimepiride (AMARYL) 2 MG tablet TAKE 2 TABLETS BY MOUTH EVERY MORNING  Qty: 60 tablet, Refills: 9      lisinopriL (PRINIVIL,ZESTRIL) 20 MG tablet Take 1 tablet (20 mg total) by mouth once daily.  Qty: 90 tablet, Refills: 3    Comments: .      lisinopriL-hydrochlorothiazide (PRINZIDE,ZESTORETIC) 20-25 mg Tab TAKE 1 TABLET BY MOUTH EVERY DAY  Qty: 90 tablet, Refills: 1      metFORMIN (GLUCOPHAGE) 500 MG tablet TAKE 2 TABLETS BY MOUTH EVERY MORNING AND 2 TABLETS WITH DINNER  Qty: 120 tablet, Refills: 10      potassium citrate (UROCIT-K) 10 mEq (1,080 mg) TbSR TAKE 1 TABLET BY MOUTH TWICE DAILY  Qty: 60 tablet, Refills: 9      rosuvastatin (CRESTOR) 20 MG tablet TAKE 1 TABLET(20 MG) BY MOUTH EVERY DAY  Qty: 30 tablet, Refills: 11    Associated Diagnoses: Mixed hyperlipidemia      testosterone (ANDROGEL) 20.25 mg/1.25 gram (1.62 %) GlPm Apply 2 pumps to shoulders daily  Qty: 1 each, Refills: 5    Associated Diagnoses: Male hypogonadism      ACCU-CHEK SOFTCLIX LANCETS Misc USE EVERY DAY  Qty: 100 each, Refills: 6      blood sugar diagnostic (ACCU-CHEK ANTONELLA PLUS TEST STRP) Strp Inject 1 strip  into the skin 2 (two) times daily before meals.  Qty: 100 strip, Refills: 11    Associated Diagnoses: Uncontrolled type 2 diabetes mellitus with hyperglycemia      blood-glucose meter kit Checks blood sugars 1x/daily.  Qty: 1 each, Refills: 12    Comments: Please dispense preferred on patient's insurance plan. E11.65.      !! HYDROcodone-acetaminophen (NORCO) 5-325 mg per tablet Take 1 tablet by mouth every 6 (six) hours as needed for Pain. Begin after the procedure as needed.  Qty: 12 tablet, Refills: 0      !! ketorolac (TORADOL) 10 mg tablet Take 1 tablet (10 mg total) by mouth every 6 (six) hours as needed for Pain.  Qty: 20 tablet, Refills: 1    Associated Diagnoses: Kidney stones; Left flank pain; Nausea      nitroGLYCERIN (NITROSTAT) 0.4 MG SL tablet Place 1 tablet (0.4 mg total) under the tongue every 5 (five) minutes as needed for Chest pain. If you need a third tablet, call 911  Qty: 60 tablet, Refills: 12    Associated Diagnoses: Coronary artery disease involving native coronary artery of native heart without angina pectoris      !! oxybutynin (DITROPAN) 5 MG Tab Take 1 tablet (5 mg total) by mouth 3 (three) times daily as needed (bladder spasms).  Qty: 30 tablet, Refills: 0      !! tamsulosin (FLOMAX) 0.4 mg Cap Take 1 capsule (0.4 mg total) by mouth once daily.  Qty: 30 capsule, Refills: 2    Associated Diagnoses: Kidney stones; Left flank pain; Nausea       !! - Potential duplicate medications found. Please discuss with provider.      STOP taking these medications       amoxicillin-clavulanate 875-125mg (AUGMENTIN) 875-125 mg per tablet Comments:   Reason for Stopping:             Discharge Procedure Orders   Diet Adult Regular     No driving until:   Order Comments: Off narcotics     Notify your health care provider if you experience any of the following:  temperature >100.4     Notify your health care provider if you experience any of the following:  persistent nausea and vomiting or diarrhea      Notify your health care provider if you experience any of the following:  severe uncontrolled pain     Notify your health care provider if you experience any of the following:  difficulty breathing or increased cough     Notify your health care provider if you experience any of the following:  severe persistent headache     Notify your health care provider if you experience any of the following:  worsening rash     Notify your health care provider if you experience any of the following:  persistent dizziness, light-headedness, or visual disturbances     Notify your health care provider if you experience any of the following:  increased confusion or weakness     Activity as tolerated      Follow-up Information     Lukasz Hughes MD Follow up in 2 month(s).    Specialty: Urology  Why: Urology follow up  Contact information:  [address]  [facility] [id number] [phone]

## 2022-02-25 NOTE — INTERVAL H&P NOTE
The patient has been examined and the H&P has been reviewed:    I concur with the findings and no changes have occurred since H&P was written.    Surgery risks, benefits and alternative options discussed and understood by patient/family.    Urine culture negative.  Here for left ureteroscopy.  I have explained the indication, risks, benefits, and alternatives of the procedure in detail.  Risks including but not limited to bleeding, infection, injury to the urethra, bladder, ureter, need for additional treatments, inability or incomplete removal of kidney stones, pain, and discomfort related to the stent were discussed in depth with the patient.  The patient voices understanding and all questions have been answered.  The patient agrees to proceed as planned with left ureteroscopy, laser lithotripsy, and ureteral stent placement.        Active Hospital Problems    Diagnosis  POA    *Nephrolithiasis [N20.0]  Yes    BPH with urinary obstruction [N40.1, N13.8]  Yes    Erectile dysfunction due to arterial insufficiency [N52.01]  Yes    Coronary artery disease involving native coronary artery of native heart [I25.10]  Yes     Performed for abnormal PET stress and exertional chest discomfort in 9/2020:  30% mid LAD, 30% distal LAD and 50% PDA.         Stage 3a chronic kidney disease [N18.31]  Yes    DM type 2 without retinopathy [E11.9]  Yes     Chronic    Male hypogonadism [E29.1]  Yes     Chronic    Hypertension associated with diabetes [E11.59, I15.2]  Yes     Chronic    Hyperlipidemia associated with type 2 diabetes mellitus [E11.69, E78.5]  Yes     Chronic    Type 2 diabetes mellitus with kidney complication, without long-term current use of insulin [E11.29]  Yes     Chronic    KUMAR on CPAP [G47.33, Z99.89]  Not Applicable     Chronic    Gout [M10.9]  Yes     Chronic      Resolved Hospital Problems   No resolved problems to display.

## 2022-02-25 NOTE — OP NOTE
[facility]  Operative Note    Date: 02/25/2022    Pre-Op Diagnosis:   1. Left ureteral stone  2. Left renal stone    Patient Active Problem List    Diagnosis Date Noted    Nephrolithiasis 02/17/2022    Chronic left shoulder pain 11/16/2021    BPH with urinary obstruction 10/20/2021    Erectile dysfunction due to arterial insufficiency 10/20/2021    Low serum alkaline phosphatase 06/22/2021    Coronary artery disease involving native coronary artery of native heart 09/30/2020    Stage 3a chronic kidney disease 09/24/2020    Severe obesity (BMI 35.0-35.9 with comorbidity) 09/15/2020    Obesity, diabetes, and hypertension syndrome 09/15/2020    DM type 2 without retinopathy 09/15/2020    Diabetes mellitus with cataract 09/15/2020    Male hypogonadism 09/15/2020    Hypertension associated with diabetes 09/14/2020    Hyperlipidemia associated with type 2 diabetes mellitus 09/14/2020    Hyperparathyroidism, primary 09/11/2020    Type 2 diabetes mellitus with kidney complication, without long-term current use of insulin 08/22/2020    KUMAR on CPAP 08/22/2020    Gout 08/22/2020    Hypertriglyceridemia 08/22/2020       Post-Op Diagnosis: same    Procedure(s) Performed:   1. Left ureteroscopy with laser lithotripsy and stone basket extraction  2. Cystoscopy  3. Left JJ ureteral stent exchange  4. Fluoro < 1 hr    Specimen(s): Stones for analysis    Staff Surgeon: Lukasz Hughes MD    Assistant Surgeon: Darian Nguyen MD; Anshu Soler MD    Anesthesia: General endotracheal anesthesia    Indications: Lukasz Person is a 66 y.o. male with a left ureteral and left renal stone presenting for definitive stone management. He is pre-stented.    Findings:  1. Left renal stone radiopaque on  film.  2. Stone encountered in left distal ureter and left midpole calyx lasered and basket extracted.    Estimated Blood Loss: min    Drains:   1. Left 6 Fr x 26 cm JJ ureteral stent with  strings    Procedure in detail:  After risks, benefits, and possible complications were explained, the patient elected to undergo the procedure and informed consent was obtained. All questions were answered in the alejandro-operative area. The patient was transferred to the cystoscopy suite and placed in the supine position. SCDs were applied and working. Anesthesia was administered. The patient was then placed in the dorsal lithotomy position and prepped and draped in the usual sterile fashion. Time out was performed, and alejandro-procedural antibiotics were confirmed.     The left renal stone was radiopaque on  film. A rigid cystoscope in a 22 Fr sheath was introduced into the patient's urethra. This passed easily. The entire urethra was visualized which showed no strictures or masses. Cystoscopy revealed the ureteral orifices in the normal anatomic location bilaterally.    The patient's left ureteral stent was grasped with alligator graspers and brought to the meatus. A motion wire was passed through the stent and into the kidney with fluoroscopic confirmation. The stent was was removed keeping the wire in place.    An 8 Fr rigid ureteroscope was passed into the patient's bladder alongside the wire under direct vision. It was then passed through the left ureteral orifice alongside the wire. A stone was encountered at the level of the distal ureter.  A 273 micron laser fiber was passed through the ureteroscope. The stone was fragmented using the laser. The laser fiber was removed and an NCircle basket was introduced through the ureteroscope. Stone fragments were removed and placed in the bladder. The ureteroscope was reinserted and the entire length of the ureter was surveyed and no additional stone fragments were visualized.    A stiff glidewire was inserted through the ureteroscope and into the kidney with fluoroscopic confirmation. The ureteroscope was removed keeping both wires in place. A 12/14 Fr 45 cm ureteral  access sheath was advanced over the stiff glide wire to the level of the renal pelvis under continuous fluoroscopy. This passed easily. The inner dilator and stiff glide wire were removed keeping the outer sheath in place. An 8 Fr flexible ureteroscope was advanced through the access sheath and into the kidney.    A stone was encountered in a midpole calyx. The laser fiber was inserted per the scope and the stone was fragmented. An NCircle basket was inserted per the scope and fragments were removed and passed off the field for analysis. Systematic pyeloscopy was performed and all remaining stone fragments were deemed small enough to pass spontaneously, <1 mm. The scope and ureteral access sheath were removed keeping the motion wire in place.    The cystoscope was reinserted and the bladder was irrigated to remove the stone fragments. The bladder was drained and the cystoscope removed keeping the wire in place.    A 6 Fr x 26 cm JJ ureteral stent with strings was passed over the wire and up into the renal pelvis using fluoro. When the coil appeared to be in good position in the kidney and the radio-opaque marker of the pusher was at the inferior pubis, the wire was removed under continuous fluoro. Good coils were seen in the kidney and the bladder using fluoro.    The patient tolerated the procedure well and was transferred to the recovery room in stable condition.    Disposition:  The patient will be discharged home from PACU. He follow up with Dr. Hughes in 2 months with a KUB and renal ultrasound prior. He was given written instructions to self-remove his ureteral stent with strings on Monday, 2/28/22.    Darian Nguyen MD

## 2022-02-25 NOTE — PATIENT INSTRUCTIONS
Please remove your ureteral stent with strings yourself on Monday, 2/28/22. In order to do so, pull the blue string hanging out of your urethra until the entire stent is removed.    Post Cystoscopy Instructions  Do not strain to have a bowel movement  No strenuous exercise x 7 days  No driving while you are on narcotic pain medications or if your gongora  catheter is in place    You can expect:  To pass stone fragments if you had a stone procedure  Have pain when you void from your stent if you have a stent in place  See blood in your urine if you have a stent in place    If you have a catheter, please return to the ER if your catheter stops draining or you are having abdominal pain.    Call the doctor if:  Temperature is greater than 101F  Persistent vomiting and inability to keep food down  Inability to void if you do not have a catheter

## 2022-02-25 NOTE — ANESTHESIA PROCEDURE NOTES
Intubation    Date/Time: 2/25/2022 9:48 AM  Performed by: Roman Augustin CRNA  Authorized by: Migel Cole MD     Intubation:     Induction:  Intravenous    Intubated:  Postinduction    Mask Ventilation:  Easy with oral airway    Attempts:  1    Attempted By:  CRNA    Method of Intubation:  Video laryngoscopy    Blade:  Dowling 4    Laryngeal View Grade: Grade I - full view of cords      Difficult Airway Encountered?: No      Complications:  None    Airway Device:  Oral endotracheal tube    Airway Device Size:  7.5    Style/Cuff Inflation:  Cuffed (inflated to minimal occlusive pressure)    Inflation Amount (mL):  9    Tube secured:  23    Secured at:  The lips    Complicating Factors:  Obesity and short neck    Findings Post-Intubation:  BS equal bilateral and atraumatic/condition of teeth unchanged

## 2022-02-28 ENCOUNTER — PATIENT MESSAGE (OUTPATIENT)
Dept: UROLOGY | Facility: CLINIC | Age: 67
End: 2022-02-28
Payer: MEDICARE

## 2022-03-02 ENCOUNTER — PES CALL (OUTPATIENT)
Dept: ADMINISTRATIVE | Facility: CLINIC | Age: 67
End: 2022-03-02
Payer: MEDICARE

## 2022-03-02 LAB
COMPN STONE: NORMAL
SPECIMEN SOURCE: NORMAL
STONE ANALYSIS IR-IMP: NORMAL

## 2022-03-03 ENCOUNTER — OFFICE VISIT (OUTPATIENT)
Dept: URGENT CARE | Facility: CLINIC | Age: 67
DRG: 690 | End: 2022-03-03
Payer: MEDICARE

## 2022-03-03 ENCOUNTER — TELEPHONE (OUTPATIENT)
Dept: INTERNAL MEDICINE | Facility: CLINIC | Age: 67
End: 2022-03-03
Payer: MEDICARE

## 2022-03-03 ENCOUNTER — HOSPITAL ENCOUNTER (INPATIENT)
Facility: HOSPITAL | Age: 67
LOS: 2 days | Discharge: HOME | DRG: 690 | End: 2022-03-06
Attending: EMERGENCY MEDICINE | Admitting: EMERGENCY MEDICINE
Payer: MEDICARE

## 2022-03-03 ENCOUNTER — TELEPHONE (OUTPATIENT)
Dept: UROLOGY | Facility: CLINIC | Age: 67
End: 2022-03-03
Payer: MEDICARE

## 2022-03-03 VITALS
DIASTOLIC BLOOD PRESSURE: 74 MMHG | BODY MASS INDEX: 33.86 KG/M2 | RESPIRATION RATE: 18 BRPM | HEART RATE: 78 BPM | TEMPERATURE: 102 F | SYSTOLIC BLOOD PRESSURE: 145 MMHG | HEIGHT: 72 IN | OXYGEN SATURATION: 95 % | WEIGHT: 250 LBS

## 2022-03-03 DIAGNOSIS — N12 PYELONEPHRITIS: ICD-10-CM

## 2022-03-03 DIAGNOSIS — R50.9 FEVER: Primary | ICD-10-CM

## 2022-03-03 DIAGNOSIS — Z96.0 S/P URETERAL STENT PLACEMENT: ICD-10-CM

## 2022-03-03 DIAGNOSIS — R50.9 FEVER, UNSPECIFIED FEVER CAUSE: ICD-10-CM

## 2022-03-03 DIAGNOSIS — Z91.89 AT RISK FOR LONG QT SYNDROME: ICD-10-CM

## 2022-03-03 DIAGNOSIS — N17.9 ACUTE RENAL FAILURE SUPERIMPOSED ON STAGE 3A CHRONIC KIDNEY DISEASE, UNSPECIFIED ACUTE RENAL FAILURE TYPE: ICD-10-CM

## 2022-03-03 DIAGNOSIS — N18.31 ACUTE RENAL FAILURE SUPERIMPOSED ON STAGE 3A CHRONIC KIDNEY DISEASE, UNSPECIFIED ACUTE RENAL FAILURE TYPE: ICD-10-CM

## 2022-03-03 DIAGNOSIS — N30.01 ACUTE CYSTITIS WITH HEMATURIA: Primary | ICD-10-CM

## 2022-03-03 DIAGNOSIS — I48.0 PAROXYSMAL ATRIAL FIBRILLATION: ICD-10-CM

## 2022-03-03 LAB
ALBUMIN SERPL BCP-MCNC: 3.6 G/DL (ref 3.5–5.2)
ALP SERPL-CCNC: 48 U/L (ref 55–135)
ALT SERPL W/O P-5'-P-CCNC: 14 U/L (ref 10–44)
ANION GAP SERPL CALC-SCNC: 11 MMOL/L (ref 8–16)
AST SERPL-CCNC: 16 U/L (ref 10–40)
BACTERIA #/AREA URNS AUTO: ABNORMAL /HPF
BASOPHILS # BLD AUTO: 0.03 K/UL (ref 0–0.2)
BASOPHILS NFR BLD: 0.3 % (ref 0–1.9)
BILIRUB SERPL-MCNC: 0.6 MG/DL (ref 0.1–1)
BILIRUB UR QL STRIP: NEGATIVE
BILIRUB UR QL STRIP: NEGATIVE
BUN SERPL-MCNC: 15 MG/DL (ref 8–23)
CALCIUM SERPL-MCNC: 9.8 MG/DL (ref 8.7–10.5)
CHLORIDE SERPL-SCNC: 101 MMOL/L (ref 95–110)
CLARITY UR REFRACT.AUTO: ABNORMAL
CO2 SERPL-SCNC: 25 MMOL/L (ref 23–29)
COLOR UR AUTO: ABNORMAL
CREAT SERPL-MCNC: 1.6 MG/DL (ref 0.5–1.4)
CTP QC/QA: YES
CTP QC/QA: YES
DIFFERENTIAL METHOD: ABNORMAL
EOSINOPHIL # BLD AUTO: 0 K/UL (ref 0–0.5)
EOSINOPHIL NFR BLD: 0.1 % (ref 0–8)
ERYTHROCYTE [DISTWIDTH] IN BLOOD BY AUTOMATED COUNT: 14.6 % (ref 11.5–14.5)
EST. GFR  (AFRICAN AMERICAN): 50.8 ML/MIN/1.73 M^2
EST. GFR  (NON AFRICAN AMERICAN): 43.9 ML/MIN/1.73 M^2
GLUCOSE SERPL-MCNC: 168 MG/DL (ref 70–110)
GLUCOSE UR QL STRIP: ABNORMAL
GLUCOSE UR QL STRIP: POSITIVE
HCT VFR BLD AUTO: 40.9 % (ref 40–54)
HGB BLD-MCNC: 12.9 G/DL (ref 14–18)
HGB UR QL STRIP: ABNORMAL
HYALINE CASTS UR QL AUTO: 53 /LPF
IMM GRANULOCYTES # BLD AUTO: 0.07 K/UL (ref 0–0.04)
IMM GRANULOCYTES NFR BLD AUTO: 0.7 % (ref 0–0.5)
INR PPP: 1.1 (ref 0.8–1.2)
KETONES UR QL STRIP: ABNORMAL
KETONES UR QL STRIP: POSITIVE
LACTATE SERPL-SCNC: 1.3 MMOL/L (ref 0.5–2.2)
LEUKOCYTE ESTERASE UR QL STRIP: ABNORMAL
LEUKOCYTE ESTERASE UR QL STRIP: POSITIVE
LYMPHOCYTES # BLD AUTO: 0.9 K/UL (ref 1–4.8)
LYMPHOCYTES NFR BLD: 8.7 % (ref 18–48)
MCH RBC QN AUTO: 29.5 PG (ref 27–31)
MCHC RBC AUTO-ENTMCNC: 31.5 G/DL (ref 32–36)
MCV RBC AUTO: 93 FL (ref 82–98)
MICROSCOPIC COMMENT: ABNORMAL
MONOCYTES # BLD AUTO: 0.7 K/UL (ref 0.3–1)
MONOCYTES NFR BLD: 7 % (ref 4–15)
NEUTROPHILS # BLD AUTO: 8.7 K/UL (ref 1.8–7.7)
NEUTROPHILS NFR BLD: 83.2 % (ref 38–73)
NITRITE UR QL STRIP: POSITIVE
NRBC BLD-RTO: 0 /100 WBC
PH UR STRIP: 5 [PH] (ref 5–8)
PH, POC UA: 6.5
PLATELET # BLD AUTO: 191 K/UL (ref 150–450)
PMV BLD AUTO: 11.1 FL (ref 9.2–12.9)
POC BLOOD, URINE: POSITIVE
POC MOLECULAR INFLUENZA A AGN: NEGATIVE
POC MOLECULAR INFLUENZA B AGN: NEGATIVE
POC NITRATES, URINE: POSITIVE
POTASSIUM SERPL-SCNC: 4.3 MMOL/L (ref 3.5–5.1)
PROT SERPL-MCNC: 7.4 G/DL (ref 6–8.4)
PROT UR QL STRIP: ABNORMAL
PROT UR QL STRIP: POSITIVE
PROTHROMBIN TIME: 11.1 SEC (ref 9–12.5)
RBC # BLD AUTO: 4.38 M/UL (ref 4.6–6.2)
RBC #/AREA URNS AUTO: 31 /HPF (ref 0–4)
SARS-COV-2 RDRP RESP QL NAA+PROBE: NEGATIVE
SODIUM SERPL-SCNC: 137 MMOL/L (ref 136–145)
SP GR UR STRIP: 1.02 (ref 1–1.03)
SP GR UR STRIP: 1.02 (ref 1–1.03)
URN SPEC COLLECT METH UR: ABNORMAL
UROBILINOGEN UR STRIP-ACNC: ABNORMAL (ref 0.3–2.2)
WBC # BLD AUTO: 10.41 K/UL (ref 3.9–12.7)
WBC #/AREA URNS AUTO: >100 /HPF (ref 0–5)
YEAST UR QL AUTO: ABNORMAL

## 2022-03-03 PROCEDURE — 99214 OFFICE O/P EST MOD 30 MIN: CPT | Mod: 25,S$GLB,, | Performed by: NURSE PRACTITIONER

## 2022-03-03 PROCEDURE — 99222 PR INITIAL HOSPITAL CARE,LEVL II: ICD-10-PCS | Mod: GC,,, | Performed by: UROLOGY

## 2022-03-03 PROCEDURE — 99222 1ST HOSP IP/OBS MODERATE 55: CPT | Mod: GC,,, | Performed by: UROLOGY

## 2022-03-03 PROCEDURE — 87086 URINE CULTURE/COLONY COUNT: CPT | Performed by: EMERGENCY MEDICINE

## 2022-03-03 PROCEDURE — 87077 CULTURE AEROBIC IDENTIFY: CPT | Mod: 59 | Performed by: INTERNAL MEDICINE

## 2022-03-03 PROCEDURE — 99285 EMERGENCY DEPT VISIT HI MDM: CPT | Mod: 25

## 2022-03-03 PROCEDURE — 87088 URINE BACTERIA CULTURE: CPT | Mod: 59 | Performed by: INTERNAL MEDICINE

## 2022-03-03 PROCEDURE — 99220 PR INITIAL OBSERVATION CARE,LEVL III: ICD-10-PCS | Mod: ,,, | Performed by: HOSPITALIST

## 2022-03-03 PROCEDURE — 87186 SC STD MICRODIL/AGAR DIL: CPT | Performed by: EMERGENCY MEDICINE

## 2022-03-03 PROCEDURE — 96361 HYDRATE IV INFUSION ADD-ON: CPT

## 2022-03-03 PROCEDURE — 99220 PR INITIAL OBSERVATION CARE,LEVL III: CPT | Mod: ,,, | Performed by: HOSPITALIST

## 2022-03-03 PROCEDURE — 99285 EMERGENCY DEPT VISIT HI MDM: CPT | Mod: CS,,, | Performed by: EMERGENCY MEDICINE

## 2022-03-03 PROCEDURE — 93010 EKG 12-LEAD: ICD-10-PCS | Mod: ,,, | Performed by: INTERNAL MEDICINE

## 2022-03-03 PROCEDURE — 81001 URINALYSIS AUTO W/SCOPE: CPT | Performed by: EMERGENCY MEDICINE

## 2022-03-03 PROCEDURE — 99285 PR EMERGENCY DEPT VISIT,LEVEL V: ICD-10-PCS | Mod: CS,,, | Performed by: EMERGENCY MEDICINE

## 2022-03-03 PROCEDURE — 99214 PR OFFICE/OUTPT VISIT, EST, LEVL IV, 30-39 MIN: ICD-10-PCS | Mod: 25,S$GLB,, | Performed by: NURSE PRACTITIONER

## 2022-03-03 PROCEDURE — 63600175 PHARM REV CODE 636 W HCPCS: Performed by: HOSPITALIST

## 2022-03-03 PROCEDURE — 87040 BLOOD CULTURE FOR BACTERIA: CPT | Mod: 59 | Performed by: EMERGENCY MEDICINE

## 2022-03-03 PROCEDURE — 85610 PROTHROMBIN TIME: CPT | Performed by: EMERGENCY MEDICINE

## 2022-03-03 PROCEDURE — 87186 SC STD MICRODIL/AGAR DIL: CPT | Mod: 59 | Performed by: INTERNAL MEDICINE

## 2022-03-03 PROCEDURE — 81003 POCT URINALYSIS, DIPSTICK, AUTOMATED, W/O SCOPE: ICD-10-PCS | Mod: QW,S$GLB,, | Performed by: NURSE PRACTITIONER

## 2022-03-03 PROCEDURE — 86803 HEPATITIS C AB TEST: CPT | Performed by: EMERGENCY MEDICINE

## 2022-03-03 PROCEDURE — 81003 URINALYSIS AUTO W/O SCOPE: CPT | Mod: QW,S$GLB,, | Performed by: NURSE PRACTITIONER

## 2022-03-03 PROCEDURE — G0378 HOSPITAL OBSERVATION PER HR: HCPCS

## 2022-03-03 PROCEDURE — 96372 PR INJECTION,THERAP/PROPH/DIAG2ST, IM OR SUBCUT: ICD-10-PCS | Mod: S$GLB,,, | Performed by: NURSE PRACTITIONER

## 2022-03-03 PROCEDURE — 87088 URINE BACTERIA CULTURE: CPT | Performed by: EMERGENCY MEDICINE

## 2022-03-03 PROCEDURE — 96372 THER/PROPH/DIAG INJ SC/IM: CPT | Mod: S$GLB,,, | Performed by: NURSE PRACTITIONER

## 2022-03-03 PROCEDURE — 83605 ASSAY OF LACTIC ACID: CPT | Performed by: EMERGENCY MEDICINE

## 2022-03-03 PROCEDURE — U0002 COVID-19 LAB TEST NON-CDC: HCPCS | Mod: QW,CR,S$GLB, | Performed by: NURSE PRACTITIONER

## 2022-03-03 PROCEDURE — 96375 TX/PRO/DX INJ NEW DRUG ADDON: CPT

## 2022-03-03 PROCEDURE — 87086 URINE CULTURE/COLONY COUNT: CPT | Performed by: INTERNAL MEDICINE

## 2022-03-03 PROCEDURE — 96365 THER/PROPH/DIAG IV INF INIT: CPT

## 2022-03-03 PROCEDURE — 63600175 PHARM REV CODE 636 W HCPCS: Performed by: EMERGENCY MEDICINE

## 2022-03-03 PROCEDURE — 93010 ELECTROCARDIOGRAM REPORT: CPT | Mod: ,,, | Performed by: INTERNAL MEDICINE

## 2022-03-03 PROCEDURE — 25000003 PHARM REV CODE 250: Performed by: EMERGENCY MEDICINE

## 2022-03-03 PROCEDURE — 85025 COMPLETE CBC W/AUTO DIFF WBC: CPT | Performed by: EMERGENCY MEDICINE

## 2022-03-03 PROCEDURE — 80053 COMPREHEN METABOLIC PANEL: CPT | Performed by: EMERGENCY MEDICINE

## 2022-03-03 PROCEDURE — 87502 INFLUENZA DNA AMP PROBE: CPT | Mod: QW,S$GLB,, | Performed by: NURSE PRACTITIONER

## 2022-03-03 PROCEDURE — 87502 POCT INFLUENZA A/B MOLECULAR: ICD-10-PCS | Mod: QW,S$GLB,, | Performed by: NURSE PRACTITIONER

## 2022-03-03 PROCEDURE — 93005 ELECTROCARDIOGRAM TRACING: CPT

## 2022-03-03 PROCEDURE — 87077 CULTURE AEROBIC IDENTIFY: CPT | Performed by: EMERGENCY MEDICINE

## 2022-03-03 PROCEDURE — U0002: ICD-10-PCS | Mod: QW,CR,S$GLB, | Performed by: NURSE PRACTITIONER

## 2022-03-03 RX ORDER — ACETAMINOPHEN 325 MG/1
650 TABLET ORAL
Status: COMPLETED | OUTPATIENT
Start: 2022-03-03 | End: 2022-03-03

## 2022-03-03 RX ORDER — TALC
6 POWDER (GRAM) TOPICAL NIGHTLY PRN
Status: DISCONTINUED | OUTPATIENT
Start: 2022-03-03 | End: 2022-03-03

## 2022-03-03 RX ORDER — PROCHLORPERAZINE EDISYLATE 5 MG/ML
5 INJECTION INTRAMUSCULAR; INTRAVENOUS EVERY 6 HOURS PRN
Status: DISCONTINUED | OUTPATIENT
Start: 2022-03-03 | End: 2022-03-06 | Stop reason: HOSPADM

## 2022-03-03 RX ORDER — IPRATROPIUM BROMIDE AND ALBUTEROL SULFATE 2.5; .5 MG/3ML; MG/3ML
3 SOLUTION RESPIRATORY (INHALATION) EVERY 6 HOURS PRN
Status: DISCONTINUED | OUTPATIENT
Start: 2022-03-03 | End: 2022-03-06 | Stop reason: HOSPADM

## 2022-03-03 RX ORDER — IBUPROFEN 200 MG
16 TABLET ORAL
Status: DISCONTINUED | OUTPATIENT
Start: 2022-03-03 | End: 2022-03-06 | Stop reason: HOSPADM

## 2022-03-03 RX ORDER — IBUPROFEN 200 MG
24 TABLET ORAL
Status: DISCONTINUED | OUTPATIENT
Start: 2022-03-03 | End: 2022-03-06 | Stop reason: HOSPADM

## 2022-03-03 RX ORDER — ALLOPURINOL 100 MG/1
100 TABLET ORAL DAILY
Status: DISCONTINUED | OUTPATIENT
Start: 2022-03-04 | End: 2022-03-06 | Stop reason: HOSPADM

## 2022-03-03 RX ORDER — SODIUM CHLORIDE 0.9 % (FLUSH) 0.9 %
10 SYRINGE (ML) INJECTION
Status: DISCONTINUED | OUTPATIENT
Start: 2022-03-03 | End: 2022-03-06 | Stop reason: HOSPADM

## 2022-03-03 RX ORDER — IBUPROFEN 200 MG
16 TABLET ORAL
Status: DISCONTINUED | OUTPATIENT
Start: 2022-03-03 | End: 2022-03-03

## 2022-03-03 RX ORDER — IBUPROFEN 200 MG
24 TABLET ORAL
Status: DISCONTINUED | OUTPATIENT
Start: 2022-03-03 | End: 2022-03-03

## 2022-03-03 RX ORDER — ONDANSETRON 2 MG/ML
4 INJECTION INTRAMUSCULAR; INTRAVENOUS EVERY 8 HOURS PRN
Status: DISCONTINUED | OUTPATIENT
Start: 2022-03-03 | End: 2022-03-06 | Stop reason: HOSPADM

## 2022-03-03 RX ORDER — TALC
6 POWDER (GRAM) TOPICAL NIGHTLY PRN
Status: DISCONTINUED | OUTPATIENT
Start: 2022-03-03 | End: 2022-03-06 | Stop reason: HOSPADM

## 2022-03-03 RX ORDER — ENOXAPARIN SODIUM 100 MG/ML
40 INJECTION SUBCUTANEOUS EVERY 24 HOURS
Status: DISCONTINUED | OUTPATIENT
Start: 2022-03-04 | End: 2022-03-06 | Stop reason: HOSPADM

## 2022-03-03 RX ORDER — LEVOFLOXACIN 500 MG/1
500 TABLET, FILM COATED ORAL DAILY
Qty: 7 TABLET | Refills: 0 | Status: ON HOLD | OUTPATIENT
Start: 2022-03-03 | End: 2022-03-06 | Stop reason: HOSPADM

## 2022-03-03 RX ORDER — INSULIN ASPART 100 [IU]/ML
1-10 INJECTION, SOLUTION INTRAVENOUS; SUBCUTANEOUS
Status: DISCONTINUED | OUTPATIENT
Start: 2022-03-03 | End: 2022-03-04

## 2022-03-03 RX ORDER — SODIUM CHLORIDE 0.9 % (FLUSH) 0.9 %
10 SYRINGE (ML) INJECTION
Status: DISCONTINUED | OUTPATIENT
Start: 2022-03-03 | End: 2022-03-03

## 2022-03-03 RX ORDER — ATORVASTATIN CALCIUM 40 MG/1
40 TABLET, FILM COATED ORAL DAILY
Refills: 11 | Status: DISCONTINUED | OUTPATIENT
Start: 2022-03-04 | End: 2022-03-06 | Stop reason: HOSPADM

## 2022-03-03 RX ORDER — ACETAMINOPHEN 325 MG/1
650 TABLET ORAL EVERY 4 HOURS PRN
Status: DISCONTINUED | OUTPATIENT
Start: 2022-03-03 | End: 2022-03-04

## 2022-03-03 RX ORDER — LISINOPRIL 20 MG/1
20 TABLET ORAL DAILY
Status: DISCONTINUED | OUTPATIENT
Start: 2022-03-04 | End: 2022-03-04

## 2022-03-03 RX ORDER — GLUCAGON 1 MG
1 KIT INJECTION
Status: DISCONTINUED | OUTPATIENT
Start: 2022-03-03 | End: 2022-03-04

## 2022-03-03 RX ORDER — TAMSULOSIN HYDROCHLORIDE 0.4 MG/1
0.4 CAPSULE ORAL DAILY
Status: DISCONTINUED | OUTPATIENT
Start: 2022-03-04 | End: 2022-03-06 | Stop reason: HOSPADM

## 2022-03-03 RX ORDER — SODIUM CHLORIDE 9 MG/ML
INJECTION, SOLUTION INTRAVENOUS CONTINUOUS
Status: ACTIVE | OUTPATIENT
Start: 2022-03-03 | End: 2022-03-04

## 2022-03-03 RX ORDER — ACETAMINOPHEN 160 MG/5ML
500 LIQUID ORAL
Status: COMPLETED | OUTPATIENT
Start: 2022-03-03 | End: 2022-03-03

## 2022-03-03 RX ORDER — FENOFIBRATE 160 MG/1
160 TABLET ORAL DAILY
Status: DISCONTINUED | OUTPATIENT
Start: 2022-03-04 | End: 2022-03-06 | Stop reason: HOSPADM

## 2022-03-03 RX ORDER — CEFTRIAXONE 1 G/1
1 INJECTION, POWDER, FOR SOLUTION INTRAMUSCULAR; INTRAVENOUS
Status: DISCONTINUED | OUTPATIENT
Start: 2022-03-03 | End: 2022-03-03 | Stop reason: HOSPADM

## 2022-03-03 RX ORDER — NALOXONE HCL 0.4 MG/ML
0.02 VIAL (ML) INJECTION
Status: DISCONTINUED | OUTPATIENT
Start: 2022-03-03 | End: 2022-03-03

## 2022-03-03 RX ADMIN — SODIUM CHLORIDE, SODIUM LACTATE, POTASSIUM CHLORIDE, AND CALCIUM CHLORIDE 1000 ML: .6; .31; .03; .02 INJECTION, SOLUTION INTRAVENOUS at 06:03

## 2022-03-03 RX ADMIN — ONDANSETRON 4 MG: 2 INJECTION INTRAMUSCULAR; INTRAVENOUS at 10:03

## 2022-03-03 RX ADMIN — CEFTRIAXONE 1 G: 1 INJECTION, POWDER, FOR SOLUTION INTRAMUSCULAR; INTRAVENOUS at 04:03

## 2022-03-03 RX ADMIN — CEFTRIAXONE 1 G: 1 INJECTION, SOLUTION INTRAVENOUS at 07:03

## 2022-03-03 RX ADMIN — ACETAMINOPHEN 499.2 MG: 160 LIQUID ORAL at 04:03

## 2022-03-03 RX ADMIN — ACETAMINOPHEN 650 MG: 325 TABLET ORAL at 09:03

## 2022-03-03 NOTE — TELEPHONE ENCOUNTER
I do not think that we have outpatient testing.  Think that patient either has to have an appointment with us or go to urgent care.  He could also go to [facility] or [facility].

## 2022-03-03 NOTE — PROGRESS NOTES
Subjective:       Patient ID: Lukasz Person is a 66 y.o. male.    Vitals:  height is 6' (1.829 m) and weight is 113.4 kg (250 lb). His temperature is 102.3 °F (39.1 °C) (abnormal). His blood pressure is 145/74 (abnormal) and his pulse is 78. His respiration is 18 and oxygen saturation is 95%.     Chief Complaint: Fever    Pt reports he developed a fever of 101.4F last night. He also feel nauseous. Pt took aspirin. He had a lithotripsy done on Friday.     Ambulatory 65 yo male with pmh sleep apnea, htn, t 2 dm, with c/o fever, nausea and headaches and fatigue for one day.  Patient states that he had lithotripsy procedure done on February 25th.  He has mild sinus congestion.  He has no shortness of breath no wheezing no cough.  He denies dysuria.  He denies abdominal pain.  He states he did have some hematuria post procedure although that has resolved.  He was out at a parade for only 1 day.  He is COVID vaccinated and has had his flu vaccine    Fever   This is a new problem. The current episode started yesterday. The problem has been resolved. The maximum temperature noted was 100 to 100.9 F. Associated symptoms include headaches, nausea and sleepiness. Pertinent negatives include no urinary pain. He has tried acetaminophen for the symptoms. The treatment provided no relief.       Constitution: Positive for fever.   HENT: Negative.    Cardiovascular: Negative.    Eyes: Negative.    Respiratory: Negative.    Gastrointestinal: Positive for nausea. Negative for bowel incontinence.   Endocrine: negative.   Genitourinary: Negative.  Negative for dysuria, flank pain, bladder incontinence and pelvic pain.   Musculoskeletal: Negative.  Negative for pain, abnormal ROM of joint and back pain.   Skin: Negative.    Allergic/Immunologic: Negative.    Neurological: Positive for headaches.   Hematologic/Lymphatic: Negative.    Psychiatric/Behavioral: Negative.        Objective:      Physical Exam   Constitutional: He is oriented to  person, place, and time. He appears well-developed. He is cooperative.  Non-toxic appearance. He does not appear ill. No distress.   HENT:   Head: Normocephalic and atraumatic.   Ears:   Right Ear: Hearing, tympanic membrane, external ear and ear canal normal.   Left Ear: Hearing, tympanic membrane, external ear and ear canal normal.   Nose: Nose normal. No mucosal edema, rhinorrhea or nasal deformity. No epistaxis. Right sinus exhibits no maxillary sinus tenderness and no frontal sinus tenderness. Left sinus exhibits no maxillary sinus tenderness and no frontal sinus tenderness.   Mouth/Throat: Uvula is midline, oropharynx is clear and moist and mucous membranes are normal. No trismus in the jaw. Normal dentition. No uvula swelling. No oropharyngeal exudate, posterior oropharyngeal edema or posterior oropharyngeal erythema.   Eyes: Conjunctivae and lids are normal. No scleral icterus.   Neck: Trachea normal and phonation normal. Neck supple. No edema present. No erythema present. No neck rigidity present.   Cardiovascular: Normal rate, regular rhythm, normal heart sounds and normal pulses.   Pulmonary/Chest: Effort normal and breath sounds normal. No respiratory distress. He has no decreased breath sounds. He has no rhonchi.   Abdominal: Normal appearance.   Musculoskeletal: Normal range of motion.         General: No deformity. Normal range of motion.   Neurological: He is alert and oriented to person, place, and time. He exhibits normal muscle tone. Coordination normal.   Skin: Skin is warm, dry, intact, not diaphoretic and not pale.   Psychiatric: His speech is normal and behavior is normal. Judgment and thought content normal.   Nursing note and vitals reviewed.    I discussed out patient treatment with patient with rocephin inj and levaquin. Patient's wife was concerned and wanted to bring to ed for furrher evaluation.   Attempted to contact ed on hold for 20 minutes after three calls and being placed on hold.          Assessment:       1. Acute cystitis with hematuria    2. Fever, unspecified fever cause          Plan:     go to ed for further evaluation    Acute cystitis with hematuria  -     levoFLOXacin (LEVAQUIN) 500 MG tablet; Take 1 tablet (500 mg total) by mouth once daily. for 7 days  Dispense: 7 tablet; Refill: 0  -     cefTRIAXone injection 1 g    Fever, unspecified fever cause  -     POCT COVID-19 Rapid Screening  -     POCT Influenza A/B MOLECULAR  -     POCT Urinalysis, Dipstick, Automated, W/O Scope  -     cefTRIAXone injection 1 g    Other orders  -     acetaminophen 160 mg/5 mL solution 499.2 mg

## 2022-03-03 NOTE — TELEPHONE ENCOUNTER
[id number]-[id number] spoke to pt wife Amanda, and FNP Lalito.  Pt now with 102 fever. POCT Urine positive for leukocytes, nitrites, blood. Pt w/initial 92 O2 sat now 94, lethargic - will send to the Emergency Rm for IV antibiotics, r/o sepsis.    ----- Message from Camilla Tesfaye sent at 3/3/2022  3:25 PM CST -----  Contact: Amanda ( Wife)-[phone]  Type:  Needs Medical Advice    Who Called: Pt's Wife   Reason Foe call: regarding pt is at Urgent care and pt is having trouble staying awake due to Fever, pt is also Nauseated   Would the patient rather a call back or a response via [facility]? Call back  Best Call Back Number: [phone]

## 2022-03-03 NOTE — TELEPHONE ENCOUNTER
Attempted to schedule pt for today or tomorrow. No available appts at primary care or [facility] location. Instructed to go to urgent care to be seen today. Patient is waiting for ride and will go later today.

## 2022-03-03 NOTE — TELEPHONE ENCOUNTER
----- Message from Betina Saab sent at 3/3/2022  7:10 AM CST -----  Contact: Pt [phone]  Patient is requesting orders for a Covid-19 test asap.  He is running fever, and has some congestion but no cough.  He will be going out of town next week and needs to make sure he is not positive.  Please call and let the patient him know that the orders have been placed.    Please call and advise.    Thank You

## 2022-03-03 NOTE — TELEPHONE ENCOUNTER
[id number]-I spoke to pt Lukasz and he is concerned he may have Covid.  Last night he had chills and had a fever early this morning of 101.4.  He slept this morning and feels better and has drank a quart of water.  No flank pain, no dysuria.  I gave number to internal med [phone] to call to schedule a Covid test.  Pt. Lukasz PRESSLEY.    ----- Message from memloom sent at 3/3/2022  7:28 AM CST -----  Regarding: Pt called to speak to the nurse regarding his care due to running a fever since having a procedure done on 2/25/22  Patient Advice:    Pt called to speak to the nurse regarding his care due to running a fever since having a procedure done on 2/25/22    Pt would like a call back this morning and can be reached at [phone]

## 2022-03-04 PROBLEM — E66.9 OBESITY (BMI 30.0-34.9): Status: ACTIVE | Noted: 2022-03-04

## 2022-03-04 PROBLEM — N17.9 ACUTE RENAL FAILURE SUPERIMPOSED ON STAGE 3A CHRONIC KIDNEY DISEASE: Status: ACTIVE | Noted: 2020-09-24

## 2022-03-04 PROBLEM — E66.811 OBESITY (BMI 30.0-34.9): Status: ACTIVE | Noted: 2022-03-04

## 2022-03-04 PROBLEM — E78.5 HYPERLIPIDEMIA: Status: ACTIVE | Noted: 2022-03-04

## 2022-03-04 LAB
ANION GAP SERPL CALC-SCNC: 13 MMOL/L (ref 8–16)
BASOPHILS # BLD AUTO: 0.02 K/UL (ref 0–0.2)
BASOPHILS NFR BLD: 0.2 % (ref 0–1.9)
BUN SERPL-MCNC: 18 MG/DL (ref 8–23)
CALCIUM SERPL-MCNC: 9.3 MG/DL (ref 8.7–10.5)
CHLORIDE SERPL-SCNC: 97 MMOL/L (ref 95–110)
CO2 SERPL-SCNC: 26 MMOL/L (ref 23–29)
CREAT SERPL-MCNC: 1.5 MG/DL (ref 0.5–1.4)
DIFFERENTIAL METHOD: ABNORMAL
EOSINOPHIL # BLD AUTO: 0 K/UL (ref 0–0.5)
EOSINOPHIL NFR BLD: 0 % (ref 0–8)
ERYTHROCYTE [DISTWIDTH] IN BLOOD BY AUTOMATED COUNT: 13.9 % (ref 11.5–14.5)
EST. GFR  (AFRICAN AMERICAN): 54.9 ML/MIN/1.73 M^2
EST. GFR  (NON AFRICAN AMERICAN): 47.5 ML/MIN/1.73 M^2
GLUCOSE SERPL-MCNC: 142 MG/DL (ref 70–110)
HCT VFR BLD AUTO: 38.9 % (ref 40–54)
HGB BLD-MCNC: 12.3 G/DL (ref 14–18)
IMM GRANULOCYTES # BLD AUTO: 0.09 K/UL (ref 0–0.04)
IMM GRANULOCYTES NFR BLD AUTO: 1 % (ref 0–0.5)
LACTATE SERPL-SCNC: 0.8 MMOL/L (ref 0.5–2.2)
LACTATE SERPL-SCNC: 2.6 MMOL/L (ref 0.5–2.2)
LYMPHOCYTES # BLD AUTO: 0.9 K/UL (ref 1–4.8)
LYMPHOCYTES NFR BLD: 10.2 % (ref 18–48)
MCH RBC QN AUTO: 29.6 PG (ref 27–31)
MCHC RBC AUTO-ENTMCNC: 31.6 G/DL (ref 32–36)
MCV RBC AUTO: 94 FL (ref 82–98)
MONOCYTES # BLD AUTO: 0.8 K/UL (ref 0.3–1)
MONOCYTES NFR BLD: 8.7 % (ref 4–15)
NEUTROPHILS # BLD AUTO: 7.3 K/UL (ref 1.8–7.7)
NEUTROPHILS NFR BLD: 79.9 % (ref 38–73)
NRBC BLD-RTO: 0 /100 WBC
PLATELET # BLD AUTO: 163 K/UL (ref 150–450)
PMV BLD AUTO: 10.7 FL (ref 9.2–12.9)
POCT GLUCOSE: 133 MG/DL (ref 70–110)
POCT GLUCOSE: 162 MG/DL (ref 70–110)
POCT GLUCOSE: 167 MG/DL (ref 70–110)
POCT GLUCOSE: 186 MG/DL (ref 70–110)
POCT GLUCOSE: 251 MG/DL (ref 70–110)
POTASSIUM SERPL-SCNC: 4.2 MMOL/L (ref 3.5–5.1)
RBC # BLD AUTO: 4.15 M/UL (ref 4.6–6.2)
SODIUM SERPL-SCNC: 136 MMOL/L (ref 136–145)
WBC # BLD AUTO: 9.1 K/UL (ref 3.9–12.7)

## 2022-03-04 PROCEDURE — 63600175 PHARM REV CODE 636 W HCPCS: Performed by: HOSPITALIST

## 2022-03-04 PROCEDURE — 51798 US URINE CAPACITY MEASURE: CPT

## 2022-03-04 PROCEDURE — 99226 PR SUBSEQUENT OBSERVATION CARE,LEVEL III: ICD-10-PCS | Mod: ,,,

## 2022-03-04 PROCEDURE — 25000003 PHARM REV CODE 250: Performed by: INTERNAL MEDICINE

## 2022-03-04 PROCEDURE — 25000242 PHARM REV CODE 250 ALT 637 W/ HCPCS: Performed by: HOSPITALIST

## 2022-03-04 PROCEDURE — 27000190 HC CPAP FULL FACE MASK W/VALVE

## 2022-03-04 PROCEDURE — 96372 THER/PROPH/DIAG INJ SC/IM: CPT

## 2022-03-04 PROCEDURE — 94660 CPAP INITIATION&MGMT: CPT

## 2022-03-04 PROCEDURE — 25000003 PHARM REV CODE 250

## 2022-03-04 PROCEDURE — C9399 UNCLASSIFIED DRUGS OR BIOLOG: HCPCS

## 2022-03-04 PROCEDURE — 27000221 HC OXYGEN, UP TO 24 HOURS

## 2022-03-04 PROCEDURE — 63600175 PHARM REV CODE 636 W HCPCS

## 2022-03-04 PROCEDURE — 99226 PR SUBSEQUENT OBSERVATION CARE,LEVEL III: CPT | Mod: ,,,

## 2022-03-04 PROCEDURE — 11000001 HC ACUTE MED/SURG PRIVATE ROOM

## 2022-03-04 PROCEDURE — 36415 COLL VENOUS BLD VENIPUNCTURE: CPT | Performed by: INTERNAL MEDICINE

## 2022-03-04 PROCEDURE — 63600175 PHARM REV CODE 636 W HCPCS: Performed by: INTERNAL MEDICINE

## 2022-03-04 PROCEDURE — 99900035 HC TECH TIME PER 15 MIN (STAT)

## 2022-03-04 PROCEDURE — 83605 ASSAY OF LACTIC ACID: CPT | Mod: 91 | Performed by: INTERNAL MEDICINE

## 2022-03-04 PROCEDURE — 80048 BASIC METABOLIC PNL TOTAL CA: CPT | Performed by: INTERNAL MEDICINE

## 2022-03-04 PROCEDURE — 25000003 PHARM REV CODE 250: Performed by: PHYSICIAN ASSISTANT

## 2022-03-04 PROCEDURE — 25000003 PHARM REV CODE 250: Performed by: HOSPITALIST

## 2022-03-04 PROCEDURE — 83605 ASSAY OF LACTIC ACID: CPT

## 2022-03-04 PROCEDURE — 85025 COMPLETE CBC W/AUTO DIFF WBC: CPT | Performed by: INTERNAL MEDICINE

## 2022-03-04 RX ORDER — HYDROCHLOROTHIAZIDE 25 MG/1
25 TABLET ORAL DAILY
Status: DISCONTINUED | OUTPATIENT
Start: 2022-03-04 | End: 2022-03-04

## 2022-03-04 RX ORDER — FLUCONAZOLE 200 MG/1
200 TABLET ORAL DAILY
Status: DISCONTINUED | OUTPATIENT
Start: 2022-03-04 | End: 2022-03-06

## 2022-03-04 RX ORDER — GLUCAGON 1 MG
1 KIT INJECTION
Status: DISCONTINUED | OUTPATIENT
Start: 2022-03-04 | End: 2022-03-06 | Stop reason: HOSPADM

## 2022-03-04 RX ORDER — ACETAMINOPHEN 500 MG
1000 TABLET ORAL EVERY 8 HOURS PRN
Status: DISCONTINUED | OUTPATIENT
Start: 2022-03-04 | End: 2022-03-06 | Stop reason: HOSPADM

## 2022-03-04 RX ORDER — ACETAMINOPHEN 325 MG/1
650 TABLET ORAL EVERY 4 HOURS PRN
Status: DISCONTINUED | OUTPATIENT
Start: 2022-03-04 | End: 2022-03-06 | Stop reason: HOSPADM

## 2022-03-04 RX ORDER — INSULIN ASPART 100 [IU]/ML
9 INJECTION, SOLUTION INTRAVENOUS; SUBCUTANEOUS
Status: DISCONTINUED | OUTPATIENT
Start: 2022-03-04 | End: 2022-03-06 | Stop reason: HOSPADM

## 2022-03-04 RX ORDER — OXYBUTYNIN CHLORIDE 5 MG/1
5 TABLET ORAL 3 TIMES DAILY PRN
Status: DISCONTINUED | OUTPATIENT
Start: 2022-03-04 | End: 2022-03-06 | Stop reason: HOSPADM

## 2022-03-04 RX ORDER — INSULIN ASPART 100 [IU]/ML
0-5 INJECTION, SOLUTION INTRAVENOUS; SUBCUTANEOUS
Status: DISCONTINUED | OUTPATIENT
Start: 2022-03-04 | End: 2022-03-06 | Stop reason: HOSPADM

## 2022-03-04 RX ORDER — AMLODIPINE BESYLATE 5 MG/1
5 TABLET ORAL DAILY
Status: DISCONTINUED | OUTPATIENT
Start: 2022-03-05 | End: 2022-03-06 | Stop reason: HOSPADM

## 2022-03-04 RX ADMIN — SODIUM CHLORIDE: 0.9 INJECTION, SOLUTION INTRAVENOUS at 12:03

## 2022-03-04 RX ADMIN — VANCOMYCIN HYDROCHLORIDE 2250 MG: 1.5 INJECTION, POWDER, LYOPHILIZED, FOR SOLUTION INTRAVENOUS at 09:03

## 2022-03-04 RX ADMIN — ACETAMINOPHEN 1000 MG: 500 TABLET ORAL at 09:03

## 2022-03-04 RX ADMIN — HYDROCHLOROTHIAZIDE 25 MG: 25 TABLET ORAL at 12:03

## 2022-03-04 RX ADMIN — PIPERACILLIN AND TAZOBACTAM 4.5 G: 4; .5 INJECTION, POWDER, LYOPHILIZED, FOR SOLUTION INTRAVENOUS; PARENTERAL at 11:03

## 2022-03-04 RX ADMIN — ATORVASTATIN CALCIUM 40 MG: 40 TABLET, FILM COATED ORAL at 09:03

## 2022-03-04 RX ADMIN — ACETAMINOPHEN 1000 MG: 500 TABLET ORAL at 04:03

## 2022-03-04 RX ADMIN — ONDANSETRON 4 MG: 2 INJECTION INTRAMUSCULAR; INTRAVENOUS at 02:03

## 2022-03-04 RX ADMIN — ALLOPURINOL 100 MG: 100 TABLET ORAL at 09:03

## 2022-03-04 RX ADMIN — FENOFIBRATE 160 MG: 160 TABLET ORAL at 09:03

## 2022-03-04 RX ADMIN — INSULIN ASPART 9 UNITS: 100 INJECTION, SOLUTION INTRAVENOUS; SUBCUTANEOUS at 09:03

## 2022-03-04 RX ADMIN — INSULIN DETEMIR 28 UNITS: 100 INJECTION, SOLUTION SUBCUTANEOUS at 09:03

## 2022-03-04 RX ADMIN — TAMSULOSIN HYDROCHLORIDE 0.4 MG: 0.4 CAPSULE ORAL at 09:03

## 2022-03-04 RX ADMIN — LISINOPRIL 20 MG: 20 TABLET ORAL at 09:03

## 2022-03-04 RX ADMIN — ACETAMINOPHEN 650 MG: 325 TABLET ORAL at 02:03

## 2022-03-04 RX ADMIN — PIPERACILLIN AND TAZOBACTAM 4.5 G: 4; .5 INJECTION, POWDER, LYOPHILIZED, FOR SOLUTION INTRAVENOUS; PARENTERAL at 04:03

## 2022-03-04 RX ADMIN — SODIUM CHLORIDE, SODIUM LACTATE, POTASSIUM CHLORIDE, AND CALCIUM CHLORIDE 1000 ML: .6; .31; .03; .02 INJECTION, SOLUTION INTRAVENOUS at 02:03

## 2022-03-04 RX ADMIN — FLUCONAZOLE 200 MG: 200 TABLET ORAL at 05:03

## 2022-03-04 RX ADMIN — ENOXAPARIN SODIUM 40 MG: 100 INJECTION SUBCUTANEOUS at 04:03

## 2022-03-04 RX ADMIN — ACETAMINOPHEN 650 MG: 325 TABLET ORAL at 05:03

## 2022-03-04 RX ADMIN — INSULIN ASPART 9 UNITS: 100 INJECTION, SOLUTION INTRAVENOUS; SUBCUTANEOUS at 05:03

## 2022-03-04 RX ADMIN — INSULIN ASPART 3 UNITS: 100 INJECTION, SOLUTION INTRAVENOUS; SUBCUTANEOUS at 12:03

## 2022-03-04 NOTE — SUBJECTIVE & OBJECTIVE
Interval History: NAEON. Fevers to 103.1 F max persist despite initiation of IV rocephin and tylenol prn. In setting of recent stent placement will broaden abx coverage with IV vanc. Vitals otherwise stable, not septic at this time. IVF given for ABDI, trend bmp. Urology following. Urine and blood cultures pending.     Review of Systems   Constitutional:  Positive for appetite change, chills and fever. Negative for activity change and unexpected weight change.   HENT:  Negative for congestion and sore throat.    Respiratory:  Negative for cough, shortness of breath and wheezing.    Cardiovascular:  Negative for chest pain, palpitations and leg swelling.   Gastrointestinal:  Positive for nausea and vomiting. Negative for abdominal pain and blood in stool.   Genitourinary:  Positive for flank pain. Negative for dysuria and hematuria.   Musculoskeletal:  Positive for myalgias. Negative for arthralgias and neck pain.   Skin:  Negative for color change and rash.   Neurological:  Negative for dizziness, seizures and numbness.   Psychiatric/Behavioral:  Negative for hallucinations and suicidal ideas.    Objective:     Vital Signs (Most Recent):  Temp: (!) 103 °F (39.4 °C) (03/04/22 0924)  Pulse: 72 (03/04/22 0900)  Resp: 18 (03/04/22 0728)  BP: (!) 140/62 (03/04/22 0728)  SpO2: 95 % (03/04/22 0900)   Vital Signs (24h Range):  Temp:  [99.4 °F (37.4 °C)-103.1 °F (39.5 °C)] 103 °F (39.4 °C)  Pulse:  [69-91] 72  Resp:  [18-21] 18  SpO2:  [90 %-100 %] 95 %  BP: (127-186)/(59-76) 140/62     Weight: 113.1 kg (249 lb 5.4 oz)  Body mass index is 33.82 kg/m².    Intake/Output Summary (Last 24 hours) at 3/4/2022 1016  Last data filed at 3/4/2022 0500  Gross per 24 hour   Intake 1000 ml   Output 300 ml   Net 700 ml      Physical Exam  Constitutional:       General: He is not in acute distress.     Appearance: He is obese. He is ill-appearing.      Comments: Visible chills, patient appears sick    HENT:      Head: Normocephalic and  atraumatic.      Mouth/Throat:      Mouth: Mucous membranes are dry.      Pharynx: No oropharyngeal exudate.   Eyes:      Extraocular Movements: Extraocular movements intact.      Pupils: Pupils are equal, round, and reactive to light.   Cardiovascular:      Rate and Rhythm: Normal rate and regular rhythm.      Heart sounds: No murmur heard.  Pulmonary:      Effort: Pulmonary effort is normal.      Breath sounds: Normal breath sounds.   Abdominal:      General: Abdomen is flat. Bowel sounds are normal. There is no distension.      Palpations: Abdomen is soft.      Tenderness: There is no abdominal tenderness. There is left CVA tenderness.   Musculoskeletal:         General: No swelling or deformity.      Cervical back: Normal range of motion and neck supple.   Skin:     General: Skin is warm and dry.      Findings: No rash.   Neurological:      General: No focal deficit present.      Mental Status: He is alert and oriented to person, place, and time.   Psychiatric:         Behavior: Behavior normal.         Thought Content: Thought content normal.       Significant Labs: All pertinent labs within the past 24 hours have been reviewed.  Blood Culture:   Recent Labs   Lab 03/03/22  1854   LABBLOO No Growth to date  No Growth to date     CBC:   Recent Labs   Lab 03/03/22  1851   WBC 10.41   HGB 12.9*   HCT 40.9        CMP:   Recent Labs   Lab 03/03/22 2003      K 4.3      CO2 25   *   BUN 15   CREATININE 1.6*   CALCIUM 9.8   PROT 7.4   ALBUMIN 3.6   BILITOT 0.6   ALKPHOS 48*   AST 16   ALT 14   ANIONGAP 11   EGFRNONAA 43.9*     Urine Culture: No results for input(s): LABURIN in the last 48 hours.  Urine Studies:   Recent Labs   Lab 03/03/22 2014   COLORU [place]   APPEARANCEUA Hazy*   PHUR 5.0   SPECGRAV 1.025   PROTEINUA 2+*   GLUCUA 3+*   KETONESU Trace*   BILIRUBINUA Negative   OCCULTUA 2+*   NITRITE Positive*   LEUKOCYTESUR 3+*   RBCUA 31*   WBCUA >100*   BACTERIA Many*   HYALINECASTS 53*        Significant Imaging: I have reviewed all pertinent imaging results/findings within the past 24 hours.

## 2022-03-04 NOTE — ED NOTES
Pt resting comfortably and independently repositioned in stretcher with bed locked in lowest position for safety. NAD noted at this time. Respirations even and unlabored and visible chest rise noted.  Patient offered bathroom assistance and denies need at this time. Pt instructed to call if assistance is needed. Pt on continuous cardiac and O2 monitoring. Call light within reach. Family at bedside. No needs at this time. Will continue to monitor.

## 2022-03-04 NOTE — CARE UPDATE
RAPID RESPONSE NURSE PROACTIVE ROUNDING NOTE       Time of Visit: 1440    Admit Date: 3/3/2022  LOS: 0  Code Status: Full Code   Date of Visit: 2022  : 1955  Age: 66 y.o.  Sex: male  Race: White  Bed: 7065/7065 A:   MRN: [id number]  Was the patient discharged from an ICU this admission? No   Was the patient discharged from a PACU within last 24 hours? No   Did the patient receive conscious sedation/general anesthesia in last 24 hours? No  Was the patient in the ED within the past 24 hours? Yes  Was the patient on NIPPV within the past 24 hours? No   Attending Physician: Billy Bingham MD  [place] [facility] [facility]    Time spent at the bedside: 15 -30 min    SITUATION    Notified by charge RN via phone call.  Reason for alert: Fever  Called to evaluate the patient for Fever    BACKGROUND     Why is the patient in the hospital?: Pyelonephritis    Patient has a past medical history of Diabetes mellitus, Hyperlipidemia, Hypertension, and Sleep apnea.    Last Vitals:  Temp: 102.8 °F (39.3 °C) ( 1416)  Pulse: 85 ( 1416)  Resp: 26 ( 1416)  BP: 135/68 ( 1416)  SpO2: 97 % ( 1416)    24 Hours Vitals Range:  Temp:  [99.4 °F (37.4 °C)-103.1 °F (39.5 °C)]   Pulse:  [69-91]   Resp:  [16-26]   BP: (123-186)/(59-76)   SpO2:  [90 %-100 %]     Labs:  Recent Labs     22  1851   WBC 10.41   HGB 12.9*   HCT 40.9          Recent Labs     22      K 4.3      CO2 25   CREATININE 1.6*   *        No results for input(s): PH, PCO2, PO2, HCO3, POCSATURATED, BE in the last 72 hours.     ASSESSMENT    Physical Exam  Pulmonary:      Effort: Pulmonary effort is normal.      Breath sounds: Normal breath sounds.   Neurological:      General: No focal deficit present.      Mental Status: He is alert and oriented to person, place, and time. Mental status is at baseline.       Called to bedside for concern for patient's fever of 103. Patient is resting in bed. His  only complaint is chills and general malaise. /68, HR 85, RR 26, O2 sats 98%. Labs were not drawnDrCarmelina Bingham at bedside evaluating patient. New orders placed for STAT CBC, CMP, and Lactic. Labs drawn by rapids nurse. Zosyn added to ABX regimen.      INTERVENTIONS    CBC  CMP  LACTIC  COOLING BLANKET    RECOMMENDATIONS    Continue IVF  Await lab results  Tylenol PRN  Monitor respiratory status  Call for drop in BP, increase in HR, or neuro status change    PROVIDER ESCALATION    Yes/No  yes    Orders received and case discussed with Dr. Bingham.    Disposition: Remain in room 7002.    FOLLOW-UP    Charge RNMarino updated on plan of care. Instructed to call the Rapid Response Nurse, Yojana Dempsey RN at [phone] for additional questions or concerns.

## 2022-03-04 NOTE — ED PROVIDER NOTES
Encounter Date: 3/3/2022       History     Chief Complaint   Patient presents with    Fever     Sent from , neg flu and covid today, given  shot for uti,     Post-op Problem     Lithotripsy on friday     67 y/o m, h/o DM, HTN, KUMAR, renal stones s/p lithotripsy and stent exchange? Last week at [facility], referred to the ED from  2/2 fever.  Fever onset yesterday, tmax 102, with mild nasal congestion but no sore throat/cough, no n/v/d, no abd or flank pain.  At , POC UA done, looked infected.  COVID and influenza neg.  Given ceftriaxone 1 g IM and tylenol.  Referred to the ED for further evaluation.    The history is provided by the patient.     Review of patient's allergies indicates:  No Known Allergies  Past Medical History:   Diagnosis Date    Diabetes mellitus     Onset late 50s/early 60s    Hyperlipidemia     Hypertension     Onset late 50s/early 60s    Sleep apnea     since 2006     Past Surgical History:   Procedure Laterality Date    CORONARY ANGIOGRAPHY N/A 9/30/2020    Procedure: ANGIOGRAM, CORONARY ARTERY;  Surgeon: John West MD;  Location: [facility] CATH LAB;  Service: Cardiology;  Laterality: N/A;    CYSTOSCOPY N/A 2/17/2022    Procedure: CYSTOSCOPY;  Surgeon: Lukasz Hughes MD;  Location: [facility] OR [address] [place] [address];  Service: Urology;  Laterality: N/A;    CYSTOSCOPY W/ URETERAL STENT PLACEMENT N/A 2/25/2022    Procedure: CYSTOSCOPY, WITH URETERAL STENT INSERTION;  Surgeon: Lukasz Hughes MD;  Location: [facility] OR [address] [place] [address];  Service: Urology;  Laterality: N/A;    LASER LITHOTRIPSY Left 2/25/2022    Procedure: LITHOTRIPSY, USING LASER;  Surgeon: Lukasz Hughes MD;  Location: [facility] OR [address] [place] [address];  Service: Urology;  Laterality: Left;    LEFT HEART CATHETERIZATION Left 9/30/2020    Procedure: Left heart cath;  Surgeon: John West MD;  Location: [facility] CATH LAB;  Service: Cardiology;  Laterality: Left;    LITHOTRIPSY      PARATHYROIDECTOMY  1/1/2-107    PYELOSCOPY Left 2/25/2022     Procedure: PYELOSCOPY;  Surgeon: Lukasz Hughes MD;  Location: [facility] OR [address] [place] [address];  Service: Urology;  Laterality: Left;    URETEROSCOPIC REMOVAL OF URETERIC CALCULUS Left 2022    Procedure: REMOVAL, CALCULUS, URETER, URETEROSCOPIC;  Surgeon: Lukasz Hughes MD;  Location: [facility] OR [address] [place] [address];  Service: Urology;  Laterality: Left;    URETEROSCOPY Left 2022    Procedure: URETEROSCOPY;  Surgeon: Lukasz Hughes MD;  Location: [facility] OR [address] [place] [address];  Service: Urology;  Laterality: Left;     Family History   Problem Relation Age of Onset    Heart disease Father 70        CABG    Arthritis Father     Diabetes Father     Kidney disease Father         had one kidney removed in early thirties    Arthritis Mother     Colon cancer Brother 32    Diabetes Paternal Grandmother     Colon polyps Paternal Grandfather     Colon cancer Paternal Grandfather     Cancer Paternal Grandfather         colon cancer at age 62    Alcohol abuse Paternal Aunt     Arthritis Sister     Arthritis Sister     Arthritis Brother     Cancer Brother         colon cancer at age 32    Cancer Maternal Grandfather         throat cancer    Hypertension Sister      Social History     Tobacco Use    Smoking status: Former Smoker     Packs/day: 1.00     Years: 7.00     Pack years: 7.00     Types: Cigarettes, Cigars     Start date: 1972     Quit date:      Years since quittin.8    Smokeless tobacco: Never Used    Tobacco comment: smoking was off and on.  cumulative 7 years.  never more than three years in one stretch or more lj   Substance Use Topics    Alcohol use: Yes     Alcohol/week: 3.0 standard drinks     Types: 2 Cans of beer, 1 Shots of liquor per week     Comment: don't drink regularly.  when I do, three beers or shots    Drug use: Not Currently     Types: Marijuana     Review of Systems   Constitutional: Positive for chills, fatigue and fever.   Respiratory: Negative for cough and shortness of breath.     Cardiovascular: Negative for chest pain.   Gastrointestinal: Negative for abdominal pain, diarrhea and vomiting.   Genitourinary: Negative for difficulty urinating and dysuria.   Musculoskeletal: Negative for back pain.   Neurological: Negative for headaches.   All other systems reviewed and are negative.      Physical Exam     Initial Vitals [03/03/22 1711]   BP Pulse Resp Temp SpO2   (!) 127/59 74 20 100.3 °F (37.9 °C) 96 %      MAP       --         Physical Exam    Nursing note and vitals reviewed.  Constitutional: Vital signs are normal. He appears well-developed and well-nourished. He is not diaphoretic.  Non-toxic appearance. He does not appear ill. No distress.   HENT:   Head: Normocephalic and atraumatic.   Mouth/Throat: Mucous membranes are normal. Mucous membranes are not dry.   MM dry   Eyes: Conjunctivae and lids are normal.   Neck: Neck supple.   Normal range of motion.  Cardiovascular: Normal rate.   Pulmonary/Chest: No respiratory distress.   Abdominal: Abdomen is soft. He exhibits no distension. There is no abdominal tenderness. There is no rebound and no guarding.   Musculoskeletal:         General: No edema.      Cervical back: Normal range of motion and neck supple.     Neurological: He is alert and oriented to person, place, and time.   Skin: Skin is warm, dry and intact. No pallor.   Psychiatric: He has a normal mood and affect. His speech is normal and behavior is normal.         ED Course   Procedures  Labs Reviewed   CBC W/ AUTO DIFFERENTIAL - Abnormal; Notable for the following components:       Result Value    RBC 4.38 (*)     Hemoglobin 12.9 (*)     MCHC 31.5 (*)     RDW 14.6 (*)     Immature Granulocytes 0.7 (*)     Gran # (ANC) 8.7 (*)     Immature Grans (Abs) 0.07 (*)     Lymph # 0.9 (*)     Gran % 83.2 (*)     Lymph % 8.7 (*)     All other components within normal limits   URINALYSIS, REFLEX TO URINE CULTURE - Abnormal; Notable for the following components:    Appearance, UA Hazy (*)      Protein, UA 2+ (*)     Glucose, UA 3+ (*)     Ketones, UA Trace (*)     Occult Blood UA 2+ (*)     Nitrite, UA Positive (*)     Leukocytes, UA 3+ (*)     All other components within normal limits    Narrative:     Specimen Source->Urine   COMPREHENSIVE METABOLIC PANEL - Abnormal; Notable for the following components:    Glucose 168 (*)     Creatinine 1.6 (*)     Alkaline Phosphatase 48 (*)     eGFR if  50.8 (*)     eGFR if non  43.9 (*)     All other components within normal limits   URINALYSIS MICROSCOPIC - Abnormal; Notable for the following components:    RBC, UA 31 (*)     WBC, UA >100 (*)     Bacteria Many (*)     Yeast, UA Rare (*)     Hyaline Casts, UA 53 (*)     All other components within normal limits    Narrative:     Specimen Source->Urine   CULTURE, BLOOD   CULTURE, BLOOD   CULTURE, URINE   LACTIC ACID, PLASMA   PROTIME-INR   HEPATITIS C ANTIBODY   POCT GLUCOSE MONITORING CONTINUOUS          Imaging Results          CT Renal Stone Study ABD Pelvis WO (Final result)  Result time 03/03/22 19:47:18    Final result by Bi Weber MD (03/03/22 19:47:18)                 Impression:      1. Since the previous CT, there has been interval placement of left ureteral stent status post lithotripsy.  There is punctate remaining left nephrolithiasis.  The stent appears in appropriate position noting left perinephric fat stranding.  Correlation with urinalysis recommended to exclude superimposed infection.  2. Right nonobstructive nephrolithiasis.  3. Findings suggesting hepatic steatosis, correlation with LFTs recommended.  4. Additional findings above.      Electronically signed by: Bi Weber MD  Date:    03/03/2022  Time:    19:47             Narrative:    EXAMINATION:  CT RENAL STONE STUDY ABD PELVIS WO    CLINICAL HISTORY:  Flank pain, kidney stone suspected;    TECHNIQUE:  Low dose axial images, sagittal and coronal reformations were obtained from the lung bases  to the pubic symphysis.  Contrast was not administered.    COMPARISON:  02/16/2022    FINDINGS:  Images of the lower thorax are remarkable for bilateral dependent atelectasis.  There is a calcified granuloma within the right lower lobe.    The spleen, pancreas, gallbladder and adrenal glands are unremarkable.  There is hypoattenuation of the hepatic parenchyma, suggesting steatosis.  There are a few scattered shotty abdominal lymph nodes.  There is a mild hiatal hernia.  There is no biliary dilation or ascites.    There is bilateral perinephric fat stranding, left greater than right.  There is a left ureteral stent, no definite calculi seen along the course of the stent.  There is mild prominence of the left renal collecting system.  There is left nonobstructive nephrolithiasis.  There is right nonobstructive nephrolithiasis.  No right hydronephrosis.  No right ureteral calculi seen.  The urinary bladder is unremarkable.  The prostate is not enlarged.    There are several scattered colonic diverticula without inflammation.  The terminal ileum and appendix are unremarkable.  The small bowel is grossly unremarkable.  There are a few scattered shotty mesenteric lymph nodes.  There is atherosclerotic calcification of the aorta and its branches.  No focal organized pelvic fluid collection.    There are bilateral fat containing inguinal hernias.  Degenerative changes are noted of the bilateral femoroacetabular joints and spine.  There is a sclerotic focus within the left iliac wing, likely bone island.  No significant inguinal lymphadenopathy.                               X-Ray Chest AP Portable (Final result)  Result time 03/03/22 18:33:21    Final result by Bi Weber MD (03/03/22 18:33:21)                 Impression:      1. No acute cardiopulmonary process, shallow inspiratory effort.      Electronically signed by: Bi Weber MD  Date:    03/03/2022  Time:    18:33             Narrative:     EXAMINATION:  XR CHEST AP PORTABLE    CLINICAL HISTORY:  Sepsis;    TECHNIQUE:  Single frontal view of the chest was performed.    COMPARISON:  None    FINDINGS:  The cardiomediastinal silhouette is not enlarged, magnified by technique.  There is a calcified granuloma projected over the left lower lung zone..  There is no pleural effusion.  The trachea is midline.  The lungs are symmetrically expanded bilaterally with mild left basilar subsegmental atelectasis..  No large focal consolidation seen.  There is no pneumothorax.  The osseous structures are remarkable for degenerative changes..                                 Medications   sodium chloride 0.9% flush 10 mL (has no administration in time range)   melatonin tablet 6 mg (has no administration in time range)   albuterol-ipratropium 2.5 mg-0.5 mg/3 mL nebulizer solution 3 mL (has no administration in time range)   ondansetron injection 4 mg (has no administration in time range)   prochlorperazine injection Soln 5 mg (has no administration in time range)   acetaminophen tablet 650 mg (has no administration in time range)   glucose chewable tablet 16 g (has no administration in time range)   glucose chewable tablet 24 g (has no administration in time range)   enoxaparin injection 40 mg (has no administration in time range)   glucagon (human recombinant) injection 1 mg (has no administration in time range)   insulin aspart U-100 pen 1-10 Units (has no administration in time range)   dextrose 10% bolus 125 mL (has no administration in time range)   dextrose 10% bolus 250 mL (has no administration in time range)   cefTRIAXone (ROCEPHIN) 1 g/50 mL D5W IVPB (has no administration in time range)   lactated ringers bolus 1,000 mL (0 mLs Intravenous Stopped 3/3/22 1959)   cefTRIAXone (ROCEPHIN) 1 g/50 mL D5W IVPB (0 g Intravenous Stopped 3/3/22 2024)   acetaminophen tablet 650 mg (650 mg Oral Given 3/3/22 2100)     Medical Decision Making:   History:   Old Medical  Records: I decided to obtain old medical records.  Initial Assessment:   65 y/o m, PMH as above, here with fever, abnormal UA, recent urologic procedure    Concerned for UTI/pyelo  ? Obstructing stone or stent displacement  HD stable though ill-appearing  Differential Diagnosis:   Urosepsis  Viral syndrome    Clinical Tests:   Lab Tests: Ordered and Reviewed  Radiological Study: Ordered and Reviewed  Medical Tests: Reviewed and Ordered  ED Management:  Given my concern for sepsis, the patient is getting an extensive ED work-up:  Labs including blood and urine cultures, CBC, CMP, lactate, CXR and other imaging deemed necessary identify the infection source    Treatment will include  IVF resuscitation  Close monitoring  If there is no response/improvement with IVF, vasopressors will be initiated  Given ill-appearance, I am empirically treating with broad-spectrum antibiotics - already received ceftriaxone 1 g IM at .  Will add an additional 1 g IV now  CT abd/pelvis to r/o infected stone    Other:   I have discussed this case with another health care provider.       <> Summary of the Discussion: Urology resident  8:22 PM  Spoke with urology resident and reviewed pt's labs and CT scan  Does not think stent should be removed or adjusted  Recommends admit to medicine for IV antibiotics             ED Course as of 03/03/22 2157   Thu Mar 03, 2022   2026 WBC: 10.41 [AS]   2026 Lactate, Narayan: 1.3 [AS]   2058 RBC, UA(!): 31 [AS]   2058 WBC, UA(!): >100 [AS]   2058 Bacteria, UA(!): Many [AS]   2058 Occult Blood UA(!): 2+ [AS]   2058 NITRITE UA(!): Positive [AS]   2058 Leukocytes, UA(!): 3+ [AS]      ED Course User Index  [AS] Carmen Vitale MD             Clinical Impression:   Final diagnoses:  [R50.9] Fever (Primary)          ED Disposition Condition    Observation               Carmen Vitale MD  03/03/22 2157

## 2022-03-04 NOTE — ASSESSMENT & PLAN NOTE
-Holding home lisinopril-HCTZ as patient at risk for sepsis and appearing volume down, also with mild ABDI   - Given 1L LR in ED, pressures stable overnight. Will begin amlodipine 5mg daily for pressure control while holding home nephrotoxic agents

## 2022-03-04 NOTE — ASSESSMENT & PLAN NOTE
Patient is a 67 yo M with a PMH of DM, HTN, KUMAR, CKD nephrolithiasis s/p left ureteroscopy with laser lithotripsy on 2/25/22 who presents 1 week post-op for fevers, chills, nausea, poor PO intake x 1 day. CT scan obtained in the ED shows left ureteral stent in good position without hydronephrosis. Urology consulted for post-op pyelonephritis.    - Agree with admission to Hospital Medicine.  - Patient needs IV antibiotics for pyelonephritis.  - Recommend Diflucan for UA which shows yeast.  - Recommend IVF  - Follow up urine cultures.  - Follow up blood cultures.  - Trend Cr.   - No Urologic intervention necessary. Given current pyelonephritis, the ureteral stent should remain in place. Once patient has recovered from this hospital admission and received an appropriate course of antibiotics we will set up outpatient cystoscopy for stent removal.

## 2022-03-04 NOTE — ASSESSMENT & PLAN NOTE
-Pt. Febrile with WBC 10, U/A consistent with UTI.  -CT abd shows stent in place with perinephric stranding around L kidney  -IV ceftriaxone started, f/u blood and urine cultures

## 2022-03-04 NOTE — CONSULTS
[facility] - Emergency Dept  Urology  Consult Note    Patient Name: Lukasz Person  MRN: [id number]  Admission Date: 3/3/2022  Hospital Length of Stay: 0   Code Status: Full Code   Attending Provider: Billy Bingham MD   Consulting Provider: Demetra Godfrey MD  Primary Care Physician: Kirk Wilson MD  Principal Problem:Pyelonephritis    Inpatient consult to Urology  Consult performed by: Demetra Godfrey MD  Consult ordered by: Carmen Vitale MD          Subjective:     HPI:  Patient is a 67 yo M with a PMH of DM, HTN, KUMAR, nephrolithiasis s/p left ureteroscopy with laser lithotripsy on 2/25/22 who presents for fevers, chills, nausea. Patient developed fevers, chills, nausea, and poor PO intake last night. He went to urgent care today who sent patient to the ED. He also complains of urinary frequency and urgency. He denies dysuria, hematuria, flank pain, suprapubic pain. CT scan obtained in the ED shows left ureteral stent in good position without hydronephrosis. WBC 10.4. Cr 1.6 (BL ~1.4). UA nitrite positive, 31 RBC, >100WBC, many bacteria, rare yeast. He has received 1L LR and 1g Rocephin in the ED.       Past Medical History:   Diagnosis Date    Diabetes mellitus     Onset late 50s/early 60s    Hyperlipidemia     Hypertension     Onset late 50s/early 60s    Sleep apnea     since 2006       Past Surgical History:   Procedure Laterality Date    CORONARY ANGIOGRAPHY N/A 9/30/2020    Procedure: ANGIOGRAM, CORONARY ARTERY;  Surgeon: John West MD;  Location: [facility] CATH LAB;  Service: Cardiology;  Laterality: N/A;    CYSTOSCOPY N/A 2/17/2022    Procedure: CYSTOSCOPY;  Surgeon: Lukasz Hughes MD;  Location: [facility] OR [address] [place] [address];  Service: Urology;  Laterality: N/A;    CYSTOSCOPY W/ URETERAL STENT PLACEMENT N/A 2/25/2022    Procedure: CYSTOSCOPY, WITH URETERAL STENT INSERTION;  Surgeon: Lukasz Hughes MD;  Location: [facility] OR [address] [place] [address];  Service: Urology;  Laterality: N/A;    LASER LITHOTRIPSY Left 2/25/2022     Procedure: LITHOTRIPSY, USING LASER;  Surgeon: Lukasz Hughes MD;  Location: [facility] OR [facility];  Service: Urology;  Laterality: Left;    LEFT HEART CATHETERIZATION Left 2020    Procedure: Left heart cath;  Surgeon: John West MD;  Location: [facility] CATH LAB;  Service: Cardiology;  Laterality: Left;    LITHOTRIPSY      PARATHYROIDECTOMY  -107    PYELOSCOPY Left 2022    Procedure: PYELOSCOPY;  Surgeon: Lukasz Hughes MD;  Location: [facility] OR [address] [place] [address];  Service: Urology;  Laterality: Left;    URETEROSCOPIC REMOVAL OF URETERIC CALCULUS Left 2022    Procedure: REMOVAL, CALCULUS, URETER, URETEROSCOPIC;  Surgeon: Lukasz Hughes MD;  Location: [facility] OR [address] [place] [address];  Service: Urology;  Laterality: Left;    URETEROSCOPY Left 2022    Procedure: URETEROSCOPY;  Surgeon: Luaksz Hughes MD;  Location: [address];  Service: Urology;  Laterality: Left;       Review of patient's allergies indicates:  No Known Allergies    Family History       Problem Relation (Age of Onset)    Alcohol abuse Paternal Aunt    Arthritis Father, Mother, Sister, Sister, Brother    Cancer Paternal Grandfather, Brother, Maternal Grandfather    Colon cancer Brother (32), Paternal Grandfather    Colon polyps Paternal Grandfather    Diabetes Father, Paternal Grandmother    Heart disease Father (70)    Hypertension Sister    Kidney disease Father            Tobacco Use    Smoking status: Former Smoker     Packs/day: 1.00     Years: 7.00     Pack years: 7.00     Types: Cigarettes, Cigars     Start date: 1972     Quit date:      Years since quittin.8    Smokeless tobacco: Never Used    Tobacco comment: smoking was off and on.  cumulative 7 years.  never more than three years in one stretch or more lj   Substance and Sexual Activity    Alcohol use: Yes     Alcohol/week: 3.0 standard drinks     Types: 2 Cans of beer, 1 Shots of liquor per week     Comment: don't drink regularly.  when I do, three beers or  shots    Drug use: Not Currently     Types: Marijuana    Sexual activity: Not Currently     Partners: Female     Birth control/protection: None     Comment:        Review of Systems   Constitutional:  Positive for chills, diaphoresis and fever. Negative for activity change, appetite change and unexpected weight change.   Respiratory:  Negative for shortness of breath.    Cardiovascular:  Negative for chest pain.   Gastrointestinal:  Positive for nausea. Negative for abdominal pain, constipation, diarrhea and vomiting.   Genitourinary:  Positive for frequency and urgency. Negative for difficulty urinating, dysuria, flank pain and hematuria.   Musculoskeletal:  Negative for back pain.   Skin:  Negative for wound.   Neurological:  Negative for headaches.   Psychiatric/Behavioral:  Negative for confusion.      Objective:     Temp:  [99.4 °F (37.4 °C)-102.3 °F (39.1 °C)] 99.4 °F (37.4 °C)  Pulse:  [74-91] 89  Resp:  [18-21] 20  SpO2:  [95 %-99 %] 99 %  BP: (127-186)/(59-76) 179/73     Body mass index is 35.67 kg/m².    Date 03/03/22 0700 - 03/04/22 0659   Shift 4721-2752 9764-4971 1713-9165 24 Hour Total   INTAKE   IV Piggyback  1000  1000   Shift Total(mL/kg)  1000(8.4)  1000(8.4)   OUTPUT   Shift Total(mL/kg)       Weight (kg)  119.3 119.3 119.3          Drains       None                   Physical Exam  Constitutional:       Appearance: Normal appearance. He is obese. He is diaphoretic.   HENT:      Head: Normocephalic and atraumatic.      Nose: Nose normal.   Eyes:      Extraocular Movements: Extraocular movements intact.   Cardiovascular:      Rate and Rhythm: Normal rate.      Pulses: Normal pulses.   Pulmonary:      Effort: Pulmonary effort is normal. No respiratory distress.   Abdominal:      General: There is no distension.      Tenderness: There is no abdominal tenderness.   Genitourinary:     Comments: No CVA tenderness.  Musculoskeletal:         General: Normal range of motion.   Neurological:       General: No focal deficit present.      Mental Status: He is alert.   Psychiatric:         Thought Content: Thought content normal.       Significant Labs:    BMP:  Recent Labs   Lab 03/03/22 2003      K 4.3      CO2 25   BUN 15   CREATININE 1.6*   CALCIUM 9.8       CBC:  Recent Labs   Lab 03/03/22  1851   WBC 10.41   HGB 12.9*   HCT 40.9          All pertinent labs results from the past 24 hours have been reviewed.    Significant Imaging:  All pertinent imaging results/findings from the past 24 hours have been reviewed.                      Assessment and Plan:     * Pyelonephritis  Patient is a 67 yo M with a PMH of DM, HTN, KUMAR, CKD nephrolithiasis s/p left ureteroscopy with laser lithotripsy on 2/25/22 who presents 1 week post-op for fevers, chills, nausea, poor PO intake x 1 day. CT scan obtained in the ED shows left ureteral stent in good position without hydronephrosis. Urology consulted for post-op pyelonephritis.    - Agree with admission to Hospital Medicine.  - Patient needs IV antibiotics for pyelonephritis.  - Recommend Diflucan for UA which shows yeast.  - Recommend IVF  - Follow up urine cultures.  - Follow up blood cultures.  - Trend Cr.   - No Urologic intervention necessary. Given current pyelonephritis, the ureteral stent should remain in place. Once patient has recovered from this hospital admission and received an appropriate course of antibiotics we will set up outpatient cystoscopy for stent removal.         VTE Risk Mitigation (From admission, onward)           Ordered     enoxaparin injection 40 mg  Daily         03/03/22 2057     IP VTE HIGH RISK PATIENT  Once         03/03/22 2057     Place sequential compression device  Until discontinued         03/03/22 2057     IP VTE HIGH RISK PATIENT  Once         03/03/22 2057     Place sequential compression device  Until discontinued         03/03/22 2057                    Thank you for your consult. I will follow-up with  patient. Please contact us if you have any additional questions.    Demetra Godfrey MD  Urology  [facility] - Emergency Dept    It was fortunate that the stent was not removed.  Review of the films reveals that it is in proper position.  Patient can follow up with Dr. Hughes for stent removal

## 2022-03-04 NOTE — PROGRESS NOTES
[facility] - Telemetry [facility] ([place] [address])  [facility] Medicine  Progress Note    Patient Name: Lukasz Person  MRN: [id number]  Patient Class: OP- Observation   Admission Date: 3/3/2022  Length of Stay: 0 days  Attending Physician: Billy Bingham MD  Primary Care Provider: Kirk Wilson MD        Subjective:     Principal Problem:Pyelonephritis        HPI:  65 yo M with PMHx DM2, HTN, HLD, KUMAR, and nephrolithiasis s/p recent lithotripsy with stent placement (2/25) who presents to the ED from urgent care for fever x1 day. Pt. Reports that he developed subjective fevers/chills last night. He has had assocaited nausea and malaise. He denies any abdominal pain, vomiting, cough, SOB, or nasal congestion. Pt. Reports that he had been feeling well initally after his lithotripsy with only minor hematuria that has since resolved. At urgent care appointment, pt; was noted to have U/A consistent with UTI as well as a fever of 102.3, so he was referred to the ED for further treatment.      Overview/Hospital Course:  Mr. Person placed in observation for acute pyelonephritis. Workup in ED consistent with UTI. CT renal study revealed left perinephric fat stranding indicating pyelonephritis. The patient is persistently febrile up to 103 F despite initiating IV rocephin and tylenol for fever control. As the patient is s/p stent placement on 2/25 we will broaden abx coverage to include gram pos organisms, adding IV vanc. Pending urine culture results, will adjust regimen as indicated. Blood cultures collected, will follow. Urology consulted and evaluated patient, agreeing with abx treatment and also recommending initiation of diflucan for yeast detected in UA. No urologic intervention necessary. Given current pyelonephritis, the ureteral stent should remain in place. Once patient has recovered from this hospital admission and received an appropriate course of antibiotics we will set up outpatient cystoscopy for stent removal. CMP revealing  minor ABDI with Cr 1.6 (baseline ~1.3). 1L LR IVF given in the ED. Repeat BMP pending. Avoiding nephrotoxins.       Interval History: NAEON. Fevers to 103.1 F max persist despite initiation of IV rocephin and tylenol prn. In setting of recent stent placement will broaden abx coverage with IV vanc. Vitals otherwise stable, not septic at this time. IVF given for ABDI, trend bmp. Urology following. Urine and blood cultures pending.     Review of Systems   Constitutional:  Positive for appetite change, chills and fever. Negative for activity change and unexpected weight change.   HENT:  Negative for congestion and sore throat.    Respiratory:  Negative for cough, shortness of breath and wheezing.    Cardiovascular:  Negative for chest pain, palpitations and leg swelling.   Gastrointestinal:  Positive for nausea and vomiting. Negative for abdominal pain and blood in stool.   Genitourinary:  Positive for flank pain. Negative for dysuria and hematuria.   Musculoskeletal:  Positive for myalgias. Negative for arthralgias and neck pain.   Skin:  Negative for color change and rash.   Neurological:  Negative for dizziness, seizures and numbness.   Psychiatric/Behavioral:  Negative for hallucinations and suicidal ideas.    Objective:     Vital Signs (Most Recent):  Temp: (!) 103 °F (39.4 °C) (03/04/22 0924)  Pulse: 72 (03/04/22 0900)  Resp: 18 (03/04/22 0728)  BP: (!) 140/62 (03/04/22 0728)  SpO2: 95 % (03/04/22 0900)   Vital Signs (24h Range):  Temp:  [99.4 °F (37.4 °C)-103.1 °F (39.5 °C)] 103 °F (39.4 °C)  Pulse:  [69-91] 72  Resp:  [18-21] 18  SpO2:  [90 %-100 %] 95 %  BP: (127-186)/(59-76) 140/62     Weight: 113.1 kg (249 lb 5.4 oz)  Body mass index is 33.82 kg/m².    Intake/Output Summary (Last 24 hours) at 3/4/2022 1016  Last data filed at 3/4/2022 0500  Gross per 24 hour   Intake 1000 ml   Output 300 ml   Net 700 ml      Physical Exam  Constitutional:       General: He is not in acute distress.     Appearance: He is obese.  He is ill-appearing.      Comments: Visible chills, patient appears sick    HENT:      Head: Normocephalic and atraumatic.      Mouth/Throat:      Mouth: Mucous membranes are dry.      Pharynx: No oropharyngeal exudate.   Eyes:      Extraocular Movements: Extraocular movements intact.      Pupils: Pupils are equal, round, and reactive to light.   Cardiovascular:      Rate and Rhythm: Normal rate and regular rhythm.      Heart sounds: No murmur heard.  Pulmonary:      Effort: Pulmonary effort is normal.      Breath sounds: Normal breath sounds.   Abdominal:      General: Abdomen is flat. Bowel sounds are normal. There is no distension.      Palpations: Abdomen is soft.      Tenderness: There is no abdominal tenderness. There is left CVA tenderness.   Musculoskeletal:         General: No swelling or deformity.      Cervical back: Normal range of motion and neck supple.   Skin:     General: Skin is warm and dry.      Findings: No rash.   Neurological:      General: No focal deficit present.      Mental Status: He is alert and oriented to person, place, and time.   Psychiatric:         Behavior: Behavior normal.         Thought Content: Thought content normal.       Significant Labs: All pertinent labs within the past 24 hours have been reviewed.  Blood Culture:   Recent Labs   Lab 03/03/22  1854   LABBLOO No Growth to date  No Growth to date     CBC:   Recent Labs   Lab 03/03/22  1851   WBC 10.41   HGB 12.9*   HCT 40.9        CMP:   Recent Labs   Lab 03/03/22 2003      K 4.3      CO2 25   *   BUN 15   CREATININE 1.6*   CALCIUM 9.8   PROT 7.4   ALBUMIN 3.6   BILITOT 0.6   ALKPHOS 48*   AST 16   ALT 14   ANIONGAP 11   EGFRNONAA 43.9*     Urine Culture: No results for input(s): LABURIN in the last 48 hours.  Urine Studies:   Recent Labs   Lab 03/03/22 2014   COLORU [place]   APPEARANCEUA Hazy*   PHUR 5.0   SPECGRAV 1.025   PROTEINUA 2+*   GLUCUA 3+*   KETONESU Trace*   BILIRUBINUA Negative    OCCULTUA 2+*   NITRITE Positive*   LEUKOCYTESUR 3+*   RBCUA 31*   WBCUA >100*   BACTERIA Many*   HYALINECASTS 53*       Significant Imaging: I have reviewed all pertinent imaging results/findings within the past 24 hours.      Assessment/Plan:      * Pyelonephritis  Patient s/p L nephrolithotripsy and ureteral stent placement on 2/25, presenting to the ED after 2 days of fevers, chills, N/V, and left-sided flank pain. UA consistent with UTI.     - Pt. Febrile to 103.1F max, with WBC 10K, HDS.   - Meeting 1/4 SIRS criteria for fever   - 1L LR given in ED   - Blood cultures collected   - CT abd shows L stent in place with perinephric stranding around L kidney, c/w pyelonephritis in setting of infectious UA  - Urine culture pending   - IV ceftriaxone started  - Patient with persistent fevers despite CTX and tylenol prn. Adding IV vanc to broaden coverage for GP organisms in setting of recent stent placement.   - Urology consulted, recommending continued abx treatment and addition of diflucan as UA detected yeast.    - Start fluconazole 200mg PO x14 days   - No acute intervention recommended from urology, will schedule follow up for opt cystoscopy and stent removal pending medical stability.     Obesity (BMI 30.0-34.9)  Body mass index is 33.82 kg/m². Morbid obesity complicates all aspects of disease management from diagnostic modalities to treatment. Weight loss encouraged and health benefits explained to patient.      Hyperlipidemia  - continue statin       S/P ureteral stent placement  -Stent in place per CT scan, urology consulted. No intervention recommended      Nephrolithiasis  -S/p L kidney lithotripsy with stent. Non-obstructive punctate stone on left, asymptomatic non-obstructing stone on R.   - Per urology, no intervention at this time       Chronic left shoulder pain  - no acute complaints of pain   - tylenol prn for now       BPH with urinary obstruction  - continue flomax, oxybutynin       Acute renal  failure superimposed on stage 3a chronic kidney disease  - Cr 1.6 on admit, baseline ~1.3.   - 1L LR bolus given in ED, trend bmp   - Continue to monitor and avoid nephrotoxic medications  - Treating acute pyelo       Male hypogonadism  - testosterone injections daily at home      Hyperlipidemia associated with type 2 diabetes mellitus  -Continue home fenofibrate      Hypertension associated with diabetes  -Holding home lisinopril-HCTZ as patient at risk for sepsis and appearing volume down, also with mild ABDI   - Given 1L LR in ED, pressures stable overnight. Will begin amlodipine 5mg daily for pressure control while holding home nephrotoxic agents        KUMAR on CPAP  -Continue home CPAP QHS      Type 2 diabetes mellitus with kidney complication, without long-term current use of insulin  -A1c 7.2 2/2022  - on admit   -Hold home oral meds   -Weight-based insulin regimen while inpatient: Levemir 28U nightly, Novolog 9U TIDWM, low dose SSI  -Diabetic diet        VTE Risk Mitigation (From admission, onward)         Ordered     enoxaparin injection 40 mg  Daily         03/03/22 2057     IP VTE HIGH RISK PATIENT  Once         03/03/22 2057     Place sequential compression device  Until discontinued         03/03/22 2057     IP VTE HIGH RISK PATIENT  Once         03/03/22 2057     Place sequential compression device  Until discontinued         03/03/22 2057                Discharge Planning   ANDRZEJ: 3/6/2022     Code Status: Full Code   Is the patient medically ready for discharge?: No    Reason for patient still in hospital (select all that apply): Patient unstable, Patient trending condition, Laboratory test, Treatment, Imaging and Consult recommendations  Discharge Plan A: Home with family                  Casandra Thorpe PA-C  Department of Hospital Medicine   [facility] - Telemetry Stepdown ([place]-)

## 2022-03-04 NOTE — ASSESSMENT & PLAN NOTE
-Continue home lisinopril, hold HCTZ as pt. Appears volume down. Monitor BP and titrate meds as needed

## 2022-03-04 NOTE — SUBJECTIVE & OBJECTIVE
Interval History: NAEON. Tmax 103.1. On abx, will f/u cx. UOP adequate. No plans for urologic interventions at this time. We will keep stents in place given ongoing infection. Will CTM      Objective:     Temp:  [99.4 °F (37.4 °C)-103.1 °F (39.5 °C)] 99.7 °F (37.6 °C)  Pulse:  [69-91] 69  Resp:  [18-21] 18  SpO2:  [90 %-100 %] 100 %  BP: (127-186)/(59-76) 137/63     Body mass index is 33.82 kg/m².           Drains       None                   Physical Exam  Constitutional:       Appearance: Normal appearance. He is obese. He is diaphoretic.   HENT:      Head: Normocephalic and atraumatic.      Nose: Nose normal.   Eyes:      Extraocular Movements: Extraocular movements intact.   Cardiovascular:      Rate and Rhythm: Normal rate.      Pulses: Normal pulses.   Pulmonary:      Effort: Pulmonary effort is normal. No respiratory distress.   Abdominal:      General: There is no distension.      Tenderness: There is no abdominal tenderness.   Genitourinary:     Comments: No CVA tenderness.  Musculoskeletal:         General: Normal range of motion.   Neurological:      General: No focal deficit present.      Mental Status: He is alert.   Psychiatric:         Thought Content: Thought content normal.       Significant Labs:    BMP:  Recent Labs   Lab 03/03/22 2003      K 4.3      CO2 25   BUN 15   CREATININE 1.6*   CALCIUM 9.8       CBC:   Recent Labs   Lab 03/03/22  1851   WBC 10.41   HGB 12.9*   HCT 40.9          All pertinent labs results from the past 24 hours have been reviewed.    Significant Imaging:  All pertinent imaging results/findings from the past 24 hours have been reviewed.

## 2022-03-04 NOTE — ED NOTES
Pt is having chills and shivering. Provider notified at this time to see if there is any pharmaceuticals that they might want to try. Tylenol given outside of ed at 1630

## 2022-03-04 NOTE — ASSESSMENT & PLAN NOTE
- Cr 1.6 on admit, baseline ~1.3.   - 1L LR bolus given in ED, trend bmp   - Continue to monitor and avoid nephrotoxic medications  - Treating acute pyelo

## 2022-03-04 NOTE — SUBJECTIVE & OBJECTIVE
Past Medical History:   Diagnosis Date    Diabetes mellitus     Onset late 50s/early 60s    Hyperlipidemia     Hypertension     Onset late 50s/early 60s    Sleep apnea     since 2006       Past Surgical History:   Procedure Laterality Date    CORONARY ANGIOGRAPHY N/A 9/30/2020    Procedure: ANGIOGRAM, CORONARY ARTERY;  Surgeon: John West MD;  Location: [facility] CATH LAB;  Service: Cardiology;  Laterality: N/A;    CYSTOSCOPY N/A 2/17/2022    Procedure: CYSTOSCOPY;  Surgeon: Lukasz Hughes MD;  Location: [address];  Service: Urology;  Laterality: N/A;    CYSTOSCOPY W/ URETERAL STENT PLACEMENT N/A 2/25/2022    Procedure: CYSTOSCOPY, WITH URETERAL STENT INSERTION;  Surgeon: Lukasz Hughes MD;  Location: [address];  Service: Urology;  Laterality: N/A;    LASER LITHOTRIPSY Left 2/25/2022    Procedure: LITHOTRIPSY, USING LASER;  Surgeon: Lukasz Hughes MD;  Location: [address];  Service: Urology;  Laterality: Left;    LEFT HEART CATHETERIZATION Left 9/30/2020    Procedure: Left heart cath;  Surgeon: John West MD;  Location: [facility] CATH LAB;  Service: Cardiology;  Laterality: Left;    LITHOTRIPSY      PARATHYROIDECTOMY  1/1/2-107    PYELOSCOPY Left 2/25/2022    Procedure: PYELOSCOPY;  Surgeon: Lukasz Hughes MD;  Location: [address];  Service: Urology;  Laterality: Left;    URETEROSCOPIC REMOVAL OF URETERIC CALCULUS Left 2/25/2022    Procedure: REMOVAL, CALCULUS, URETER, URETEROSCOPIC;  Surgeon: Lukasz Hughes MD;  Location: [address];  Service: Urology;  Laterality: Left;    URETEROSCOPY Left 2/25/2022    Procedure: URETEROSCOPY;  Surgeon: Lukasz Hughes MD;  Location: [address];  Service: Urology;  Laterality: Left;       Review of patient's allergies indicates:  No Known Allergies    Current Facility-Administered Medications on File Prior to Encounter   Medication    [COMPLETED] acetaminophen 160 mg/5 mL solution 499.2 mg    [COMPLETED]  cefTRIAXone injection 1 g     Current Outpatient Medications on File Prior to Encounter   Medication Sig    ACCU-CHEK SOFTCLIX LANCETS Misc USE EVERY DAY    allopurinoL (ZYLOPRIM) 100 MG tablet TAKE 1 TABLET BY MOUTH EVERY DAY    blood sugar diagnostic (ACCU-CHEK ANTONELLA PLUS TEST STRP) Strp Inject 1 strip into the skin 2 (two) times daily before meals.    blood-glucose meter kit Checks blood sugars 1x/daily.    fenofibrate 160 MG Tab TAKE 1 TABLET(160 MG) BY MOUTH EVERY DAY (Patient taking differently: Take by mouth every evening.)    glimepiride (AMARYL) 2 MG tablet TAKE 2 TABLETS BY MOUTH EVERY MORNING    HYDROcodone-acetaminophen (NORCO) 5-325 mg per tablet Take 1 tablet by mouth every 6 (six) hours as needed for Pain.    ketorolac (TORADOL) 10 mg tablet Take 1 tablet (10 mg total) by mouth every 6 (six) hours as needed for Pain.    ketorolac (TORADOL) 10 mg tablet Take 1 tablet (10 mg total) by mouth every 6 (six) hours.    levoFLOXacin (LEVAQUIN) 500 MG tablet Take 1 tablet (500 mg total) by mouth once daily. for 7 days    lisinopriL (PRINIVIL,ZESTRIL) 20 MG tablet Take 1 tablet (20 mg total) by mouth once daily.    lisinopriL-hydrochlorothiazide (PRINZIDE,ZESTORETIC) 20-25 mg Tab TAKE 1 TABLET BY MOUTH EVERY DAY    metFORMIN (GLUCOPHAGE) 500 MG tablet TAKE 2 TABLETS BY MOUTH EVERY MORNING AND 2 TABLETS WITH DINNER    nitroGLYCERIN (NITROSTAT) 0.4 MG SL tablet Place 1 tablet (0.4 mg total) under the tongue every 5 (five) minutes as needed for Chest pain. If you need a third tablet, call 911    oxybutynin (DITROPAN) 5 MG Tab Take 1 tablet (5 mg total) by mouth 3 (three) times daily as needed (Bladder spasms).    phenazopyridine (PYRIDIUM) 200 MG tablet Take 1 tablet (200 mg total) by mouth 3 (three) times daily as needed (Burning with urination).    potassium citrate (UROCIT-K) 10 mEq (1,080 mg) TbSR TAKE 1 TABLET BY MOUTH TWICE DAILY    rosuvastatin (CRESTOR) 20 MG tablet TAKE 1 TABLET(20 MG) BY MOUTH EVERY DAY  (Patient taking differently: Take by mouth every evening.)    tamsulosin (FLOMAX) 0.4 mg Cap Take 1 capsule (0.4 mg total) by mouth once daily.    tamsulosin (FLOMAX) 0.4 mg Cap Take 1 capsule (0.4 mg total) by mouth once daily.    testosterone (ANDROGEL) 20.25 mg/1.25 gram (1.62 %) GlPm Apply 2 pumps to shoulders daily     Family History       Problem Relation (Age of Onset)    Alcohol abuse Paternal Aunt    Arthritis Father, Mother, Sister, Sister, Brother    Cancer Paternal Grandfather, Brother, Maternal Grandfather    Colon cancer Brother (32), Paternal Grandfather    Colon polyps Paternal Grandfather    Diabetes Father, Paternal Grandmother    Heart disease Father (70)    Hypertension Sister    Kidney disease Father          Tobacco Use    Smoking status: Former Smoker     Packs/day: 1.00     Years: 7.00     Pack years: 7.00     Types: Cigarettes, Cigars     Start date: 1972     Quit date:      Years since quittin.8    Smokeless tobacco: Never Used    Tobacco comment: smoking was off and on.  cumulative 7 years.  never more than three years in one stretch or more lj   Substance and Sexual Activity    Alcohol use: Yes     Alcohol/week: 3.0 standard drinks     Types: 2 Cans of beer, 1 Shots of liquor per week     Comment: don't drink regularly.  when I do, three beers or shots    Drug use: Not Currently     Types: Marijuana    Sexual activity: Not Currently     Partners: Female     Birth control/protection: None     Comment:      Review of Systems   Constitutional:  Negative for activity change, chills, fever and unexpected weight change.   HENT:  Negative for congestion and sore throat.    Respiratory:  Negative for cough, shortness of breath and wheezing.    Cardiovascular:  Negative for chest pain, palpitations and leg swelling.   Gastrointestinal:  Negative for abdominal pain, blood in stool, nausea and vomiting.   Genitourinary:  Negative for dysuria and hematuria.   Musculoskeletal:   Negative for arthralgias and neck pain.   Skin:  Negative for color change and rash.   Neurological:  Negative for dizziness, seizures and numbness.   Psychiatric/Behavioral:  Negative for hallucinations and suicidal ideas.    Objective:     Vital Signs (Most Recent):  Temp: 99.4 °F (37.4 °C) (03/03/22 1933)  Pulse: 89 (03/03/22 2051)  Resp: 20 (03/03/22 2051)  BP: (!) 179/73 (03/03/22 2051)  SpO2: 99 % (03/03/22 2051)   Vital Signs (24h Range):  Temp:  [99.4 °F (37.4 °C)-102.3 °F (39.1 °C)] 99.4 °F (37.4 °C)  Pulse:  [74-91] 89  Resp:  [18-21] 20  SpO2:  [95 %-99 %] 99 %  BP: (127-186)/(59-76) 179/73     Weight: 119.3 kg (263 lb)  Body mass index is 35.67 kg/m².    Physical Exam  Constitutional:       General: He is not in acute distress.     Appearance: Normal appearance. He is normal weight.   HENT:      Head: Normocephalic and atraumatic.      Mouth/Throat:      Mouth: Mucous membranes are dry.      Pharynx: No oropharyngeal exudate.   Eyes:      Extraocular Movements: Extraocular movements intact.      Pupils: Pupils are equal, round, and reactive to light.   Cardiovascular:      Rate and Rhythm: Normal rate and regular rhythm.      Heart sounds: No murmur heard.  Pulmonary:      Effort: Pulmonary effort is normal.      Breath sounds: Normal breath sounds.   Abdominal:      General: There is no distension.      Tenderness: There is no abdominal tenderness. There is left CVA tenderness.   Musculoskeletal:         General: No swelling or deformity.      Cervical back: Normal range of motion and neck supple.   Skin:     General: Skin is warm and dry.      Findings: No rash.   Neurological:      General: No focal deficit present.      Mental Status: He is alert and oriented to person, place, and time.         CRANIAL NERVES     CN III, IV, VI   Pupils are equal, round, and reactive to light.     Significant Labs: All pertinent labs within the past 24 hours have been reviewed.  CBC:   Recent Labs   Lab  03/03/22  1851   WBC 10.41   HGB 12.9*   HCT 40.9        CMP:   Recent Labs   Lab 03/03/22 2003      K 4.3      CO2 25   *   BUN 15   CREATININE 1.6*   CALCIUM 9.8   PROT 7.4   ALBUMIN 3.6   BILITOT 0.6   ALKPHOS 48*   AST 16   ALT 14   ANIONGAP 11   EGFRNONAA 43.9*       Significant Imaging: I have reviewed all pertinent imaging results/findings within the past 24 hours.

## 2022-03-04 NOTE — HPI
67 yo M with PMHx DM2, HTN, HLD, KUMAR, and nephrolithiasis s/p recent lithotripsy with stent placement (2/25) who presents to the ED from urgent care for fever x1 day. Pt. Reports that he developed subjective fevers/chills last night. He has had assocaited nausea and malaise. He denies any abdominal pain, vomiting, cough, SOB, or nasal congestion. Pt. Reports that he had been feeling well initally after his lithotripsy with only minor hematuria that has since resolved. At urgent care appointment, pt; was noted to have U/A consistent with UTI as well as a fever of 102.3, so he was referred to the ED for further treatment.

## 2022-03-04 NOTE — ASSESSMENT & PLAN NOTE
Patient s/p L nephrolithotripsy and ureteral stent placement on 2/25, presenting to the ED after 2 days of fevers, chills, N/V, and left-sided flank pain. UA consistent with UTI.     - Pt. Febrile to 103.1F max, with WBC 10K, HDS.   - Meeting 1/4 SIRS criteria for fever   - 1L LR given in ED   - Blood cultures collected   - CT abd shows L stent in place with perinephric stranding around L kidney, c/w pyelonephritis in setting of infectious UA  - Urine culture pending   - IV ceftriaxone started  - Patient with persistent fevers despite CTX and tylenol prn. Adding IV vanc to broaden coverage for GP organisms in setting of recent stent placement.   - Urology consulted, recommending continued abx treatment and addition of diflucan as UA detected yeast.    - Start fluconazole 200mg PO x14 days   - No acute intervention recommended from urology, will schedule follow up for opt cystoscopy and stent removal pending medical stability.

## 2022-03-04 NOTE — ED NOTES
Pt moved from hallway in to room 26 in ED and this RN. Upon waking up and moving in to room, pt immediately had one episode of emesis. Pt appears diaphoretic also. This RN to medicate pt with PRHENRY Wadsworth. Zachery SCHAFFER aware. r

## 2022-03-04 NOTE — ASSESSMENT & PLAN NOTE
-A1c 7.2 2/2022  - on admit   -Hold home oral meds   -Weight-based insulin regimen while inpatient: Levemir 28U nightly, Novolog 9U TIDWM, low dose SSI  -Diabetic diet

## 2022-03-04 NOTE — PROGRESS NOTES
[facility] Tele[facility]down ([place] [id number])  Urology  Progress Note    Patient Name: Lukasz Person  MRN: [id number]  Admission Date: 3/3/2022  Hospital Length of Stay: 0 days  Code Status: Full Code   Attending Provider: Billy Bingham MD   Primary Care Physician: Kirk Wilson MD    Subjective:     HPI:  Patient is a 65 yo M with a PMH of DM, HTN, KUMAR, nephrolithiasis s/p left ureteroscopy with laser lithotripsy on 2/25/22 who presents for fevers, chills, nausea. Patient developed fevers, chills, nausea, and poor PO intake last night. He went to urgent care today who sent patient to the ED. He also complains of urinary frequency and urgency. He denies dysuria, hematuria, flank pain, suprapubic pain. CT scan obtained in the ED shows left ureteral stent in good position without hydronephrosis. WBC 10.4. Cr 1.6 (BL ~1.4). UA nitrite positive, 31 RBC, >100WBC, many bacteria, rare yeast. He has received 1L LR and 1g Rocephin in the ED.       Interval History: NAEON. Tmax 103.1. On abx, will f/u cx. UOP adequate. No plans for urologic interventions at this time. We will keep stents in place given ongoing infection. Will CTM      Objective:     Temp:  [99.4 °F (37.4 °C)-103.1 °F (39.5 °C)] 99.7 °F (37.6 °C)  Pulse:  [69-91] 69  Resp:  [18-21] 18  SpO2:  [90 %-100 %] 100 %  BP: (127-186)/(59-76) 137/63     Body mass index is 33.82 kg/m².           Drains       None                   Physical Exam  Constitutional:       Appearance: Normal appearance. He is obese. He is diaphoretic.   HENT:      Head: Normocephalic and atraumatic.      Nose: Nose normal.   Eyes:      Extraocular Movements: Extraocular movements intact.   Cardiovascular:      Rate and Rhythm: Normal rate.      Pulses: Normal pulses.   Pulmonary:      Effort: Pulmonary effort is normal. No respiratory distress.   Abdominal:      General: There is no distension.      Tenderness: There is no abdominal tenderness.   Genitourinary:     Comments: No CVA  tenderness.  Musculoskeletal:         General: Normal range of motion.   Neurological:      General: No focal deficit present.      Mental Status: He is alert.   Psychiatric:         Thought Content: Thought content normal.       Significant Labs:    BMP:  Recent Labs   Lab 03/03/22 2003      K 4.3      CO2 25   BUN 15   CREATININE 1.6*   CALCIUM 9.8       CBC:   Recent Labs   Lab 03/03/22  1851   WBC 10.41   HGB 12.9*   HCT 40.9          All pertinent labs results from the past 24 hours have been reviewed.    Significant Imaging:  All pertinent imaging results/findings from the past 24 hours have been reviewed.                    Assessment/Plan:     * Pyelonephritis  Patient is a 65 yo M with a PMH of DM, HTN, KUMAR, CKD nephrolithiasis s/p left ureteroscopy with laser lithotripsy on 2/25/22 who presents 1 week post-op for fevers, chills, nausea, poor PO intake x 1 day. CT scan obtained in the ED shows left ureteral stent in good position without hydronephrosis. Urology consulted for post-op pyelonephritis.    -IV antibiotics for pyelonephritis, f/u Cx and tailor abx  - Recommend Diflucan for UA which shows yeast.  - Recommend IVF  - Follow up urine cultures.  - Follow up blood cultures.  - Trend Cr.   - No Urologic intervention necessary. Given current pyelonephritis, the ureteral stent should remain in place. Once patient has recovered from this hospital admission and received an appropriate course of antibiotics we will set up outpatient cystoscopy for stent removal.         VTE Risk Mitigation (From admission, onward)         Ordered     enoxaparin injection 40 mg  Daily         03/03/22 2057     IP VTE HIGH RISK PATIENT  Once         03/03/22 2057     Place sequential compression device  Until discontinued         03/03/22 2057     IP VTE HIGH RISK PATIENT  Once         03/03/22 2057     Place sequential compression device  Until discontinued         03/03/22 2057                Juan  MD Sukumar  Urology  [facility] ([place]-)

## 2022-03-04 NOTE — HPI
Patient is a 67 yo M with a PMH of DM, HTN, KUMAR, nephrolithiasis s/p left ureteroscopy with laser lithotripsy on 2/25/22 who presents for fevers, chills, nausea. Patient developed fevers, chills, nausea, and poor PO intake last night. He went to urgent care today who sent patient to the ED. He also complains of urinary frequency and urgency. He denies dysuria, hematuria, flank pain, suprapubic pain. CT scan obtained in the ED shows left ureteral stent in good position without hydronephrosis. WBC 10.4. Cr 1.6 (BL ~1.4). UA nitrite positive, 31 RBC, >100WBC, many bacteria, rare yeast. He has received 1L LR and 1g Rocephin in the ED.

## 2022-03-04 NOTE — ASSESSMENT & PLAN NOTE
Body mass index is 33.82 kg/m². Morbid obesity complicates all aspects of disease management from diagnostic modalities to treatment. Weight loss encouraged and health benefits explained to patient.

## 2022-03-04 NOTE — ASSESSMENT & PLAN NOTE
-S/p L kidney lithotripsy with stent. Non-obstructive punctate stone on left, asymptomatic non-obstructing stone on R.   - Per urology, no intervention at this time

## 2022-03-04 NOTE — CONSULTS
Pharmacokinetic Initial Assessment: IV Vancomycin    Assessment/Plan:    Initiate intravenous vancomycin with loading dose of 2250 mg once with subsequent doses when random concentrations are less than 20 mcg/mL.  Desired empiric serum trough concentration is 10 to 20 mcg/mL.  Draw vancomycin random level on 3/5 at approximately 0830.  Pharmacy will continue to follow and monitor vancomycin.      Please contact inpatient pharmacy with any questions regarding this assessment.     Thank you for the consult,   Danielle Tabares       Patient brief summary:  Lukasz Person is a 66 y.o. male initiated on antimicrobial therapy with IV Vancomycin for treatment of suspected  pyelonephritis s/p stent placement.    Drug Allergies:   Review of patient's allergies indicates:  No Known Allergies    Actual Body Weight:   113.1 kg    Renal Function:   Estimated Creatinine Clearance: 58.2 mL/min (A) (based on SCr of 1.6 mg/dL (H)).,     Dialysis Method (if applicable):  N/A    CBC (last 72 hours):  Recent Labs   Lab Result Units 03/03/22  1851   WBC K/uL 10.41   Hemoglobin g/dL 12.9*   Hematocrit % 40.9   Platelets K/uL 191   Gran % % 83.2*   Lymph % % 8.7*   Mono % % 7.0   Eosinophil % % 0.1   Basophil % % 0.3   Differential Method  Automated       Metabolic Panel (last 72 hours):  Recent Labs   Lab Result Units 03/03/22 2003 03/03/22 2014   Sodium mmol/L 137  --    Potassium mmol/L 4.3  --    Chloride mmol/L 101  --    CO2 mmol/L 25  --    Glucose mg/dL 168*  --    Glucose, UA   --  3+*   BUN mg/dL 15  --    Creatinine mg/dL 1.6*  --    Albumin g/dL 3.6  --    Total Bilirubin mg/dL 0.6  --    Alkaline Phosphatase U/L 48*  --    AST U/L 16  --    ALT U/L 14  --        Drug levels (last 3 results):  No results for input(s): VANCOMYCINRA, VANCOMYCINPE, VANCOMYCINTR in the last 72 hours.    Microbiologic Results:  Microbiology Results (last 7 days)       Procedure Component Value Units Date/Time    Blood culture x two cultures. Draw  prior to antibiotics. [[id number]] Collected: 03/03/22 1854    Order Status: Completed Specimen: Blood from Peripheral, Forearm, Left Updated: 03/04/22 0315     Blood Culture, Routine No Growth to date    Narrative:      Aerobic and anaerobic    Blood culture x two cultures. Draw prior to antibiotics. [[id number]] Collected: 03/03/22 1854    Order Status: Completed Specimen: Blood from Peripheral, Forearm, Left Updated: 03/04/22 0315     Blood Culture, Routine No Growth to date    Narrative:      Aerobic and anaerobic    Urine culture [[id number]] Collected: 03/03/22 2014    Order Status: No result Specimen: Urine Updated: 03/03/22 2032

## 2022-03-04 NOTE — HOSPITAL COURSE
Mr. Person placed in observation for acute pyelonephritis. Workup in ED consistent with UTI. CT renal study revealed left perinephric fat stranding indicating pyelonephritis. The patient persistently febrile up to 103 F despite initiating IV rocephin and tylenol for fever control. As the patient is s/p stent placement on 2/25, abx coverage was broadened to include gram pos organisms, IV vanc added. Blood cultures collected, NGTD.  Urology consulted and evaluated patient, agreeing with abx treatment and also recommending initiation of diflucan for yeast detected in UA. No urologic intervention necessary. Given current pyelonephritis, the ureteral stent should remain in place. Once patient has recovered from this hospital admission and received an appropriate course of antibiotics we will set up outpatient cystoscopy for stent removal. CMP revealing minor ABDI with Cr 1.6 (baseline ~1.3). 1L LR IVF given in the ED. Trend BMP. 3/4 rocephin replaced with zosyn as fevers persisted. Patient vitals improved on adjusted abx regimen, fevers controlled. Urine cultures resulted with GNR 3/5, continue zosyn, vanc discontinued. Urine culture revealed Klebsiella Aerogenes 50-99k CFU.  Discharged on Cipofloxacin with probiotic.  This will continue until after his ureteral stent removal which in scheduled for 3/16.  Incidental atrial fibrillation noted during stay, started NOAC - plan as reflected below.     Pyelonephritis  Patient s/p L nephrolithotripsy and ureteral stent placement on 2/25, presenting to the ED after 2 days of fevers, chills, N/V, and left-sided flank pain. UA consistent with UTI.      - Pt. Febrile to 103.1F max, with WBC 10K, HDS.   - Meeting 1/4 SIRS criteria for fever   - 1L LR given in ED   - Blood cultures collected, NGTD  - CT abd shows L stent in place with perinephric stranding around L kidney, c/w pyelonephritis in setting of infectious UA  - Urine culture pending   - IV ceftriaxone started  - Patient with  persistent fevers despite CTX and tylenol prn. Adding IV vanc to broaden coverage for GP organisms in setting of recent stent placement.   - Urology consulted, recommending continued abx treatment and addition of diflucan as UA detected yeast.           - Start fluconazole 200mg PO x14 days   - No acute intervention recommended from urology, will schedule follow up for opt cystoscopy and stent removal pending medical stability.   - 3/4 fevers persisting, CTX --> zosyn, continue vanc and diflucan   - 3/5 urine culture growing GNR; d/c vanc. Continue zosyn and diflucan, changed to Cipro for Klebsiella as above.      Atrial Fibrillation      -found during hospital stay      -rate controlled       -started NOAC, Xarelto - will need to hold 48 hr before stent retrieval       -Cardiology f/u    Obesity (BMI 30.0-34.9)  Body mass index is 33.82 kg/m². Morbid obesity complicates all aspects of disease management from diagnostic modalities to treatment. Weight loss encouraged and health benefits explained to patient.        Hyperlipidemia  - continue statin         S/P ureteral stent placement  -Stent in place per CT scan, urology consulted. No intervention recommended        Nephrolithiasis  -S/p L kidney lithotripsy with stent. Non-obstructive punctate stone on left, asymptomatic non-obstructing stone on R.   - Per urology, no intervention at this time         Chronic left shoulder pain  - no acute complaints of pain   - tylenol prn for now         BPH with urinary obstruction  - continue flomax, oxybutynin         Acute renal failure superimposed on stage 3a chronic kidney disease  - Cr 1.6 on admit, baseline ~1.3.   - 1L LR bolus given in ED, trend bmp   - Continue to monitor and avoid nephrotoxic medications  - Treating acute pyelo   - fluid repletion as indicated      Male hypogonadism  - testosterone injections daily at home        Hyperlipidemia associated with type 2 diabetes mellitus  -Continue home fenofibrate         Hypertension associated with diabetes  -Holding home lisinopril-HCTZ as patient at risk for sepsis and appearing volume down, also with mild ABDI   - Given 1L LR in ED, pressures stable overnight. Will begin amlodipine 5mg daily for pressure control while holding home nephrotoxic agents          KUMAR on CPAP  -home CPAP QHS  - patient not utilizing CPAP at night inpatient, prefers supplemental O2 with NC -- ok to continue        Type 2 diabetes mellitus with kidney complication, without long-term current use of insulin  -A1c 7.2 2/2022  - on admit   -Hold home oral meds   -Weight-based insulin regimen while inpatient: Levemir 28U nightly, Novolog 9U TIDWM, low dose SSI  -Diabetic diet

## 2022-03-04 NOTE — PLAN OF CARE
[facility] - Telemetry Stepdown ([place]  Initial Discharge Assessment       Primary Care Provider: Kirk Wilson MD    Admission Diagnosis: Fever [R50.9]    Admission Date: 3/3/2022  Expected Discharge Date: 3/6/2022         Payor: MEDICARE / Plan: MEDICARE PART A & B / Product Type: Government /     Extended Emergency Contact Information  Primary Emergency Contact: Faviola Person  Mobile Phone: [phone]  Relation: Spouse  Preferred language: English   needed? No    Discharge Plan A: (P) Home with family  Discharge Plan B: (P) Home with family, Home Health      [id number] - [place], [place] - [address] AT [facility] [address] [place] [address]  [address] [place] [address] [address]  Phone: [phone] Fax: [phone]               SW completed Discharge Planning Assessment with patient and wife, Faviola via bedside. Discharge planning booklet given to patient/family and whiteboard updated with ANDRZEJ and phone #. All questions answered.    Faviola reported that patient will have transportation upon discharge.     Faviola reported that her and patient live at home together and prior to hospitalization patient was independent with his ADL's. Faviola reported that patient is not on dialysis and he does not go to a Coumadin clinic.     Faviola reported that they do have stairs in their home; however, patient does not have difficulty going up and down them.         Santa Fisher LMSW  [facility]  Ext. [phone]

## 2022-03-04 NOTE — SUBJECTIVE & OBJECTIVE
Past Medical History:   Diagnosis Date    Diabetes mellitus     Onset late 50s/early 60s    Hyperlipidemia     Hypertension     Onset late 50s/early 60s    Sleep apnea     since 2006       Past Surgical History:   Procedure Laterality Date    CORONARY ANGIOGRAPHY N/A 9/30/2020    Procedure: ANGIOGRAM, CORONARY ARTERY;  Surgeon: John West MD;  Location: [facility] CATH LAB;  Service: Cardiology;  Laterality: N/A;    CYSTOSCOPY N/A 2/17/2022    Procedure: CYSTOSCOPY;  Surgeon: Lukasz Hughes MD;  Location: [address];  Service: Urology;  Laterality: N/A;    CYSTOSCOPY W/ URETERAL STENT PLACEMENT N/A 2/25/2022    Procedure: CYSTOSCOPY, WITH URETERAL STENT INSERTION;  Surgeon: Lukasz Hughes MD;  Location: [facility] OR [address] [place] [address];  Service: Urology;  Laterality: N/A;    LASER LITHOTRIPSY Left 2/25/2022    Procedure: LITHOTRIPSY, USING LASER;  Surgeon: Lukasz Hughes MD;  Location: [address];  Service: Urology;  Laterality: Left;    LEFT HEART CATHETERIZATION Left 9/30/2020    Procedure: Left heart cath;  Surgeon: John West MD;  Location: [facility] CATH LAB;  Service: Cardiology;  Laterality: Left;    LITHOTRIPSY      PARATHYROIDECTOMY  1/1/2-107    PYELOSCOPY Left 2/25/2022    Procedure: PYELOSCOPY;  Surgeon: Lukasz Hughes MD;  Location: [address];  Service: Urology;  Laterality: Left;    URETEROSCOPIC REMOVAL OF URETERIC CALCULUS Left 2/25/2022    Procedure: REMOVAL, CALCULUS, URETER, URETEROSCOPIC;  Surgeon: Lukasz Hughes MD;  Location: [facility] OR [address] [place] [address];  Service: Urology;  Laterality: Left;    URETEROSCOPY Left 2/25/2022    Procedure: URETEROSCOPY;  Surgeon: Lukasz Hughes MD;  Location: [facility] OR [address] [place] [address];  Service: Urology;  Laterality: Left;       Review of patient's allergies indicates:  No Known Allergies    Family History       Problem Relation (Age of Onset)    Alcohol abuse Paternal Aunt    Arthritis Father, Mother, Sister, Sister, Brother    Cancer Paternal  Grandfather, Brother, Maternal Grandfather    Colon cancer Brother (32), Paternal Grandfather    Colon polyps Paternal Grandfather    Diabetes Father, Paternal Grandmother    Heart disease Father (70)    Hypertension Sister    Kidney disease Father            Tobacco Use    Smoking status: Former Smoker     Packs/day: 1.00     Years: 7.00     Pack years: 7.00     Types: Cigarettes, Cigars     Start date: 1972     Quit date:      Years since quittin.8    Smokeless tobacco: Never Used    Tobacco comment: smoking was off and on.  cumulative 7 years.  never more than three years in one stretch or more lj   Substance and Sexual Activity    Alcohol use: Yes     Alcohol/week: 3.0 standard drinks     Types: 2 Cans of beer, 1 Shots of liquor per week     Comment: don't drink regularly.  when I do, three beers or shots    Drug use: Not Currently     Types: Marijuana    Sexual activity: Not Currently     Partners: Female     Birth control/protection: None     Comment:        Review of Systems   Constitutional:  Positive for chills, diaphoresis and fever. Negative for activity change, appetite change and unexpected weight change.   Respiratory:  Negative for shortness of breath.    Cardiovascular:  Negative for chest pain.   Gastrointestinal:  Positive for nausea. Negative for abdominal pain, constipation, diarrhea and vomiting.   Genitourinary:  Positive for frequency and urgency. Negative for difficulty urinating, dysuria, flank pain and hematuria.   Musculoskeletal:  Negative for back pain.   Skin:  Negative for wound.   Neurological:  Negative for headaches.   Psychiatric/Behavioral:  Negative for confusion.      Objective:     Temp:  [99.4 °F (37.4 °C)-102.3 °F (39.1 °C)] 99.4 °F (37.4 °C)  Pulse:  [74-91] 89  Resp:  [18-21] 20  SpO2:  [95 %-99 %] 99 %  BP: (127-186)/(59-76) 179/73     Body mass index is 35.67 kg/m².    Date 22 0700 - 22 0659   Shift 2304-9354 5381-1125 2079-8313 24 Hour  Total   INTAKE   IV Piggyback  1000  1000   Shift Total(mL/kg)  1000(8.4)  1000(8.4)   OUTPUT   Shift Total(mL/kg)       Weight (kg)  119.3 119.3 119.3          Drains       None                   Physical Exam  Constitutional:       Appearance: Normal appearance. He is obese. He is diaphoretic.   HENT:      Head: Normocephalic and atraumatic.      Nose: Nose normal.   Eyes:      Extraocular Movements: Extraocular movements intact.   Cardiovascular:      Rate and Rhythm: Normal rate.      Pulses: Normal pulses.   Pulmonary:      Effort: Pulmonary effort is normal. No respiratory distress.   Abdominal:      General: There is no distension.      Tenderness: There is no abdominal tenderness.   Genitourinary:     Comments: No CVA tenderness.  Musculoskeletal:         General: Normal range of motion.   Neurological:      General: No focal deficit present.      Mental Status: He is alert.   Psychiatric:         Thought Content: Thought content normal.       Significant Labs:    BMP:  Recent Labs   Lab 03/03/22 2003      K 4.3      CO2 25   BUN 15   CREATININE 1.6*   CALCIUM 9.8       CBC:  Recent Labs   Lab 03/03/22  1851   WBC 10.41   HGB 12.9*   HCT 40.9          All pertinent labs results from the past 24 hours have been reviewed.    Significant Imaging:  All pertinent imaging results/findings from the past 24 hours have been reviewed.

## 2022-03-04 NOTE — PLAN OF CARE
Problem: UTI (Urinary Tract Infection)  Goal: Improved Infection Symptoms  Outcome: Ongoing, Progressing     Problem: Fever  Goal: Body Temperature in Desired Range  Outcome: Ongoing, Progressing     Problem: Gas Exchange Impaired  Goal: Optimal Gas Exchange  Outcome: Ongoing, Progressing     Problem: Diabetes Comorbidity  Goal: Blood Glucose Level Within Targeted Range  Outcome: Ongoing, Progressing     Problem: Fall Injury Risk  Goal: Absence of Fall and Fall-Related Injury  Outcome: Ongoing, Progressing     Problem: Adult Inpatient Plan of Care  Goal: Absence of Hospital-Acquired Illness or Injury  Outcome: Ongoing, Progressing     Problem: Adult Inpatient Plan of Care  Goal: Optimal Comfort and Wellbeing  Outcome: Ongoing, Progressing     Problem: Adult Inpatient Plan of Care  Goal: Readiness for Transition of Care  Outcome: Ongoing, Progressing

## 2022-03-04 NOTE — ASSESSMENT & PLAN NOTE
Patient is a 65 yo M with a PMH of DM, HTN, KUMAR, CKD nephrolithiasis s/p left ureteroscopy with laser lithotripsy on 2/25/22 who presents 1 week post-op for fevers, chills, nausea, poor PO intake x 1 day. CT scan obtained in the ED shows left ureteral stent in good position without hydronephrosis. Urology consulted for post-op pyelonephritis.    -IV antibiotics for pyelonephritis, f/u Cx and tailor abx  - Recommend Diflucan for UA which shows yeast.  - Recommend IVF  - Follow up urine cultures.  - Follow up blood cultures.  - Trend Cr.   - No Urologic intervention necessary. Given current pyelonephritis, the ureteral stent should remain in place. Once patient has recovered from this hospital admission and received an appropriate course of antibiotics we will set up outpatient cystoscopy for stent removal.

## 2022-03-04 NOTE — H&P
[facility] - Emergency [facility] Medicine  History & Physical    Patient Name: Lukasz Person  MRN: [id number]  Patient Class: OP- Observation  Admission Date: 3/3/2022  Attending Physician: Billy Bingham MD   Primary Care Provider: Kirk Wilson MD         Patient information was obtained from patient, past medical records and ER records.     Subjective:     Principal Problem:Pyelonephritis    Chief Complaint:   Chief Complaint   Patient presents with    Fever     Sent from , neg flu and covid today, given  shot for uti,     Post-op Problem     Lithotripsy on friday        HPI: 67 yo M with PMHx DM2, HTN, HLD, KUMAR, and nephrolithiasis s/p recent lithotripsy with stent placement (2/25) who presents to the ED from urgent care for fever x1 day. Pt. Reports that he developed subjective fevers/chills last night. He has had assocaited nausea and malaise. He denies any abdominal pain, vomiting, cough, SOB, or nasal congestion. Pt. Reports that he had been feeling well initally after his lithotripsy with only minor hematuria that has since resolved. At urgent care appointment, pt; was noted to have U/A consistent with UTI as well as a fever of 102.3, so he was referred to the ED for further treatment.      Past Medical History:   Diagnosis Date    Diabetes mellitus     Onset late 50s/early 60s    Hyperlipidemia     Hypertension     Onset late 50s/early 60s    Sleep apnea     since 2006       Past Surgical History:   Procedure Laterality Date    CORONARY ANGIOGRAPHY N/A 9/30/2020    Procedure: ANGIOGRAM, CORONARY ARTERY;  Surgeon: John West MD;  Location: [facility] CATH LAB;  Service: Cardiology;  Laterality: N/A;    CYSTOSCOPY N/A 2/17/2022    Procedure: CYSTOSCOPY;  Surgeon: Lukasz Hughes MD;  Location: [facility] OR [address] [place] [address];  Service: Urology;  Laterality: N/A;    CYSTOSCOPY W/ URETERAL STENT PLACEMENT N/A 2/25/2022    Procedure: CYSTOSCOPY, WITH URETERAL STENT INSERTION;  Surgeon: Lukasz Hughes MD;   Location: [address];  Service: Urology;  Laterality: N/A;    LASER LITHOTRIPSY Left 2/25/2022    Procedure: LITHOTRIPSY, USING LASER;  Surgeon: Lukasz Hughes MD;  Location: [facility] OR [address] [place] [address];  Service: Urology;  Laterality: Left;    LEFT HEART CATHETERIZATION Left 9/30/2020    Procedure: Left heart cath;  Surgeon: John West MD;  Location: [facility] CATH LAB;  Service: Cardiology;  Laterality: Left;    LITHOTRIPSY      PARATHYROIDECTOMY  1/1/2-107    PYELOSCOPY Left 2/25/2022    Procedure: PYELOSCOPY;  Surgeon: Lukasz Hughes MD;  Location: [facility] OR [address] [place] [address];  Service: Urology;  Laterality: Left;    URETEROSCOPIC REMOVAL OF URETERIC CALCULUS Left 2/25/2022    Procedure: REMOVAL, CALCULUS, URETER, URETEROSCOPIC;  Surgeon: Lukasz Hughes MD;  Location: [address];  Service: Urology;  Laterality: Left;    URETEROSCOPY Left 2/25/2022    Procedure: URETEROSCOPY;  Surgeon: Lukasz Hughes MD;  Location: [address];  Service: Urology;  Laterality: Left;       Review of patient's allergies indicates:  No Known Allergies    Current Facility-Administered Medications on File Prior to Encounter   Medication    [COMPLETED] acetaminophen 160 mg/5 mL solution 499.2 mg    [COMPLETED] cefTRIAXone injection 1 g     Current Outpatient Medications on File Prior to Encounter   Medication Sig    ACCU-CHEK SOFTCLIX LANCETS Misc USE EVERY DAY    allopurinoL (ZYLOPRIM) 100 MG tablet TAKE 1 TABLET BY MOUTH EVERY DAY    blood sugar diagnostic (ACCU-CHEK ANTONELLA PLUS TEST STRP) Strp Inject 1 strip into the skin 2 (two) times daily before meals.    blood-glucose meter kit Checks blood sugars 1x/daily.    fenofibrate 160 MG Tab TAKE 1 TABLET(160 MG) BY MOUTH EVERY DAY (Patient taking differently: Take by mouth every evening.)    glimepiride (AMARYL) 2 MG tablet TAKE 2 TABLETS BY MOUTH EVERY MORNING    HYDROcodone-acetaminophen (NORCO) 5-325 mg per tablet Take 1 tablet by mouth every 6 (six) hours as  needed for Pain.    ketorolac (TORADOL) 10 mg tablet Take 1 tablet (10 mg total) by mouth every 6 (six) hours as needed for Pain.    ketorolac (TORADOL) 10 mg tablet Take 1 tablet (10 mg total) by mouth every 6 (six) hours.    levoFLOXacin (LEVAQUIN) 500 MG tablet Take 1 tablet (500 mg total) by mouth once daily. for 7 days    lisinopriL (PRINIVIL,ZESTRIL) 20 MG tablet Take 1 tablet (20 mg total) by mouth once daily.    lisinopriL-hydrochlorothiazide (PRINZIDE,ZESTORETIC) 20-25 mg Tab TAKE 1 TABLET BY MOUTH EVERY DAY    metFORMIN (GLUCOPHAGE) 500 MG tablet TAKE 2 TABLETS BY MOUTH EVERY MORNING AND 2 TABLETS WITH DINNER    nitroGLYCERIN (NITROSTAT) 0.4 MG SL tablet Place 1 tablet (0.4 mg total) under the tongue every 5 (five) minutes as needed for Chest pain. If you need a third tablet, call 911    oxybutynin (DITROPAN) 5 MG Tab Take 1 tablet (5 mg total) by mouth 3 (three) times daily as needed (Bladder spasms).    phenazopyridine (PYRIDIUM) 200 MG tablet Take 1 tablet (200 mg total) by mouth 3 (three) times daily as needed (Burning with urination).    potassium citrate (UROCIT-K) 10 mEq (1,080 mg) TbSR TAKE 1 TABLET BY MOUTH TWICE DAILY    rosuvastatin (CRESTOR) 20 MG tablet TAKE 1 TABLET(20 MG) BY MOUTH EVERY DAY (Patient taking differently: Take by mouth every evening.)    tamsulosin (FLOMAX) 0.4 mg Cap Take 1 capsule (0.4 mg total) by mouth once daily.    tamsulosin (FLOMAX) 0.4 mg Cap Take 1 capsule (0.4 mg total) by mouth once daily.    testosterone (ANDROGEL) 20.25 mg/1.25 gram (1.62 %) GlPm Apply 2 pumps to shoulders daily     Family History       Problem Relation (Age of Onset)    Alcohol abuse Paternal Aunt    Arthritis Father, Mother, Sister, Sister, Brother    Cancer Paternal Grandfather, Brother, Maternal Grandfather    Colon cancer Brother (32), Paternal Grandfather    Colon polyps Paternal Grandfather    Diabetes Father, Paternal Grandmother    Heart disease Father (70)    Hypertension  Sister    Kidney disease Father          Tobacco Use    Smoking status: Former Smoker     Packs/day: 1.00     Years: 7.00     Pack years: 7.00     Types: Cigarettes, Cigars     Start date: 1972     Quit date:      Years since quittin.8    Smokeless tobacco: Never Used    Tobacco comment: smoking was off and on.  cumulative 7 years.  never more than three years in one stretch or more lj   Substance and Sexual Activity    Alcohol use: Yes     Alcohol/week: 3.0 standard drinks     Types: 2 Cans of beer, 1 Shots of liquor per week     Comment: don't drink regularly.  when I do, three beers or shots    Drug use: Not Currently     Types: Marijuana    Sexual activity: Not Currently     Partners: Female     Birth control/protection: None     Comment:      Review of Systems   Constitutional:  Negative for activity change, chills, fever and unexpected weight change.   HENT:  Negative for congestion and sore throat.    Respiratory:  Negative for cough, shortness of breath and wheezing.    Cardiovascular:  Negative for chest pain, palpitations and leg swelling.   Gastrointestinal:  Negative for abdominal pain, blood in stool, nausea and vomiting.   Genitourinary:  Negative for dysuria and hematuria.   Musculoskeletal:  Negative for arthralgias and neck pain.   Skin:  Negative for color change and rash.   Neurological:  Negative for dizziness, seizures and numbness.   Psychiatric/Behavioral:  Negative for hallucinations and suicidal ideas.    Objective:     Vital Signs (Most Recent):  Temp: 99.4 °F (37.4 °C) (22)  Pulse: 89 (22)  Resp: 20 (22)  BP: (!) 179/73 (22)  SpO2: 99 % (22)   Vital Signs (24h Range):  Temp:  [99.4 °F (37.4 °C)-102.3 °F (39.1 °C)] 99.4 °F (37.4 °C)  Pulse:  [74-91] 89  Resp:  [18-21] 20  SpO2:  [95 %-99 %] 99 %  BP: (127-186)/(59-76) 179/73     Weight: 119.3 kg (263 lb)  Body mass index is 35.67 kg/m².    Physical  Exam  Constitutional:       General: He is not in acute distress.     Appearance: Normal appearance. He is normal weight.   HENT:      Head: Normocephalic and atraumatic.      Mouth/Throat:      Mouth: Mucous membranes are dry.      Pharynx: No oropharyngeal exudate.   Eyes:      Extraocular Movements: Extraocular movements intact.      Pupils: Pupils are equal, round, and reactive to light.   Cardiovascular:      Rate and Rhythm: Normal rate and regular rhythm.      Heart sounds: No murmur heard.  Pulmonary:      Effort: Pulmonary effort is normal.      Breath sounds: Normal breath sounds.   Abdominal:      General: There is no distension.      Tenderness: There is no abdominal tenderness. There is left CVA tenderness.   Musculoskeletal:         General: No swelling or deformity.      Cervical back: Normal range of motion and neck supple.   Skin:     General: Skin is warm and dry.      Findings: No rash.   Neurological:      General: No focal deficit present.      Mental Status: He is alert and oriented to person, place, and time.         CRANIAL NERVES     CN III, IV, VI   Pupils are equal, round, and reactive to light.     Significant Labs: All pertinent labs within the past 24 hours have been reviewed.  CBC:   Recent Labs   Lab 03/03/22  1851   WBC 10.41   HGB 12.9*   HCT 40.9        CMP:   Recent Labs   Lab 03/03/22 2003      K 4.3      CO2 25   *   BUN 15   CREATININE 1.6*   CALCIUM 9.8   PROT 7.4   ALBUMIN 3.6   BILITOT 0.6   ALKPHOS 48*   AST 16   ALT 14   ANIONGAP 11   EGFRNONAA 43.9*       Significant Imaging: I have reviewed all pertinent imaging results/findings within the past 24 hours.    Assessment/Plan:     * Pyelonephritis  -Pt. Febrile with WBC 10, U/A consistent with UTI.  -CT abd shows stent in place with perinephric stranding around L kidney  -IV ceftriaxone started, f/u blood and urine cultures      S/P ureteral stent placement  -Stent in place per CT scan, urology  consulted. No intervention recommended      Nephrolithiasis  -S/p lithotripsy with stent. Only small retained stones noted on CT      Stage 3a chronic kidney disease  -Cr at baseline of 1.3-1.6  -Continue to monitor and avoid nephrotoxic medications      Hyperlipidemia associated with type 2 diabetes mellitus  -Continue home fenofibrate      Hypertension associated with diabetes  -Continue home lisinopril, hold HCTZ as pt. Appears volume down. Monitor BP and titrate meds as needed      KUMAR on CPAP  -Continue home CPAP QHS      Type 2 diabetes mellitus with kidney complication, without long-term current use of insulin  -A1c 7.2 2/2022  -Hold home PO med glimepiride  -SSI and diabetic diet while inpatient        VTE Risk Mitigation (From admission, onward)         Ordered     enoxaparin injection 40 mg  Daily         03/03/22 2057     IP VTE HIGH RISK PATIENT  Once         03/03/22 2057     Place sequential compression device  Until discontinued         03/03/22 2057     IP VTE HIGH RISK PATIENT  Once         03/03/22 2057     Place sequential compression device  Until discontinued         03/03/22 2057                   Ivan Wheeler MD  Department of Hospital Medicine   [facility] - Emergency Dept

## 2022-03-05 LAB
ALBUMIN SERPL BCP-MCNC: 2.7 G/DL (ref 3.5–5.2)
ALP SERPL-CCNC: 48 U/L (ref 55–135)
ALT SERPL W/O P-5'-P-CCNC: 19 U/L (ref 10–44)
ANION GAP SERPL CALC-SCNC: 10 MMOL/L (ref 8–16)
AST SERPL-CCNC: 28 U/L (ref 10–40)
BASOPHILS # BLD AUTO: 0.02 K/UL (ref 0–0.2)
BASOPHILS NFR BLD: 0.3 % (ref 0–1.9)
BILIRUB SERPL-MCNC: 0.6 MG/DL (ref 0.1–1)
BUN SERPL-MCNC: 21 MG/DL (ref 8–23)
CALCIUM SERPL-MCNC: 9.1 MG/DL (ref 8.7–10.5)
CHLORIDE SERPL-SCNC: 99 MMOL/L (ref 95–110)
CO2 SERPL-SCNC: 25 MMOL/L (ref 23–29)
CREAT SERPL-MCNC: 1.6 MG/DL (ref 0.5–1.4)
DIFFERENTIAL METHOD: ABNORMAL
EOSINOPHIL # BLD AUTO: 0 K/UL (ref 0–0.5)
EOSINOPHIL NFR BLD: 0.6 % (ref 0–8)
ERYTHROCYTE [DISTWIDTH] IN BLOOD BY AUTOMATED COUNT: 14 % (ref 11.5–14.5)
EST. GFR  (AFRICAN AMERICAN): 50.8 ML/MIN/1.73 M^2
EST. GFR  (NON AFRICAN AMERICAN): 43.9 ML/MIN/1.73 M^2
GLUCOSE SERPL-MCNC: 183 MG/DL (ref 70–110)
HCT VFR BLD AUTO: 35.3 % (ref 40–54)
HGB BLD-MCNC: 11.2 G/DL (ref 14–18)
IMM GRANULOCYTES # BLD AUTO: 0.03 K/UL (ref 0–0.04)
IMM GRANULOCYTES NFR BLD AUTO: 0.5 % (ref 0–0.5)
LYMPHOCYTES # BLD AUTO: 0.9 K/UL (ref 1–4.8)
LYMPHOCYTES NFR BLD: 14.4 % (ref 18–48)
MCH RBC QN AUTO: 29.1 PG (ref 27–31)
MCHC RBC AUTO-ENTMCNC: 31.7 G/DL (ref 32–36)
MCV RBC AUTO: 92 FL (ref 82–98)
MONOCYTES # BLD AUTO: 0.7 K/UL (ref 0.3–1)
MONOCYTES NFR BLD: 10.2 % (ref 4–15)
NEUTROPHILS # BLD AUTO: 4.8 K/UL (ref 1.8–7.7)
NEUTROPHILS NFR BLD: 74 % (ref 38–73)
NRBC BLD-RTO: 0 /100 WBC
PLATELET # BLD AUTO: 148 K/UL (ref 150–450)
PMV BLD AUTO: 10.8 FL (ref 9.2–12.9)
POCT GLUCOSE: 128 MG/DL (ref 70–110)
POCT GLUCOSE: 148 MG/DL (ref 70–110)
POCT GLUCOSE: 154 MG/DL (ref 70–110)
POCT GLUCOSE: 189 MG/DL (ref 70–110)
POTASSIUM SERPL-SCNC: 3.7 MMOL/L (ref 3.5–5.1)
PROT SERPL-MCNC: 6.6 G/DL (ref 6–8.4)
RBC # BLD AUTO: 3.85 M/UL (ref 4.6–6.2)
SODIUM SERPL-SCNC: 134 MMOL/L (ref 136–145)
WBC # BLD AUTO: 6.47 K/UL (ref 3.9–12.7)

## 2022-03-05 PROCEDURE — 94660 CPAP INITIATION&MGMT: CPT

## 2022-03-05 PROCEDURE — 25000242 PHARM REV CODE 250 ALT 637 W/ HCPCS: Performed by: HOSPITALIST

## 2022-03-05 PROCEDURE — 25000003 PHARM REV CODE 250: Performed by: HOSPITALIST

## 2022-03-05 PROCEDURE — 99233 PR SUBSEQUENT HOSPITAL CARE,LEVL III: ICD-10-PCS | Mod: ,,,

## 2022-03-05 PROCEDURE — 11000001 HC ACUTE MED/SURG PRIVATE ROOM

## 2022-03-05 PROCEDURE — 99900035 HC TECH TIME PER 15 MIN (STAT)

## 2022-03-05 PROCEDURE — 94799 UNLISTED PULMONARY SVC/PX: CPT

## 2022-03-05 PROCEDURE — 80053 COMPREHEN METABOLIC PANEL: CPT | Performed by: PHYSICIAN ASSISTANT

## 2022-03-05 PROCEDURE — 36415 COLL VENOUS BLD VENIPUNCTURE: CPT | Performed by: PHYSICIAN ASSISTANT

## 2022-03-05 PROCEDURE — 85025 COMPLETE CBC W/AUTO DIFF WBC: CPT | Performed by: PHYSICIAN ASSISTANT

## 2022-03-05 PROCEDURE — 25000003 PHARM REV CODE 250: Performed by: PHYSICIAN ASSISTANT

## 2022-03-05 PROCEDURE — 94761 N-INVAS EAR/PLS OXIMETRY MLT: CPT

## 2022-03-05 PROCEDURE — 25000003 PHARM REV CODE 250: Performed by: NURSE PRACTITIONER

## 2022-03-05 PROCEDURE — 63600175 PHARM REV CODE 636 W HCPCS

## 2022-03-05 PROCEDURE — 63600175 PHARM REV CODE 636 W HCPCS: Performed by: HOSPITALIST

## 2022-03-05 PROCEDURE — 99233 SBSQ HOSP IP/OBS HIGH 50: CPT | Mod: ,,,

## 2022-03-05 PROCEDURE — 25000003 PHARM REV CODE 250

## 2022-03-05 RX ORDER — CALCIUM CARBONATE 200(500)MG
500 TABLET,CHEWABLE ORAL 3 TIMES DAILY PRN
Status: DISCONTINUED | OUTPATIENT
Start: 2022-03-05 | End: 2022-03-06 | Stop reason: HOSPADM

## 2022-03-05 RX ADMIN — INSULIN DETEMIR 28 UNITS: 100 INJECTION, SOLUTION SUBCUTANEOUS at 08:03

## 2022-03-05 RX ADMIN — FENOFIBRATE 160 MG: 160 TABLET ORAL at 08:03

## 2022-03-05 RX ADMIN — PIPERACILLIN AND TAZOBACTAM 4.5 G: 4; .5 INJECTION, POWDER, LYOPHILIZED, FOR SOLUTION INTRAVENOUS; PARENTERAL at 08:03

## 2022-03-05 RX ADMIN — ACETAMINOPHEN 1000 MG: 500 TABLET ORAL at 05:03

## 2022-03-05 RX ADMIN — AMLODIPINE BESYLATE 5 MG: 5 TABLET ORAL at 08:03

## 2022-03-05 RX ADMIN — TAMSULOSIN HYDROCHLORIDE 0.4 MG: 0.4 CAPSULE ORAL at 08:03

## 2022-03-05 RX ADMIN — INSULIN ASPART 9 UNITS: 100 INJECTION, SOLUTION INTRAVENOUS; SUBCUTANEOUS at 08:03

## 2022-03-05 RX ADMIN — FLUCONAZOLE 200 MG: 200 TABLET ORAL at 08:03

## 2022-03-05 RX ADMIN — ENOXAPARIN SODIUM 40 MG: 100 INJECTION SUBCUTANEOUS at 06:03

## 2022-03-05 RX ADMIN — SODIUM CHLORIDE, SODIUM LACTATE, POTASSIUM CHLORIDE, AND CALCIUM CHLORIDE 1000 ML: .6; .31; .03; .02 INJECTION, SOLUTION INTRAVENOUS at 06:03

## 2022-03-05 RX ADMIN — ATORVASTATIN CALCIUM 40 MG: 40 TABLET, FILM COATED ORAL at 08:03

## 2022-03-05 RX ADMIN — PIPERACILLIN AND TAZOBACTAM 4.5 G: 4; .5 INJECTION, POWDER, LYOPHILIZED, FOR SOLUTION INTRAVENOUS; PARENTERAL at 11:03

## 2022-03-05 RX ADMIN — ALLOPURINOL 100 MG: 100 TABLET ORAL at 08:03

## 2022-03-05 RX ADMIN — PIPERACILLIN AND TAZOBACTAM 4.5 G: 4; .5 INJECTION, POWDER, LYOPHILIZED, FOR SOLUTION INTRAVENOUS; PARENTERAL at 04:03

## 2022-03-05 RX ADMIN — CALCIUM CARBONATE (ANTACID) CHEW TAB 500 MG 500 MG: 500 CHEW TAB at 08:03

## 2022-03-05 RX ADMIN — ACETAMINOPHEN 1000 MG: 500 TABLET ORAL at 02:03

## 2022-03-05 NOTE — SUBJECTIVE & OBJECTIVE
Interval History:   Continues to be febrile overnight.   On abx, will f/u cx. Urine growing GNRs.   UOP adequate. Morning labs pending.       Objective:     Temp:  [98.1 °F (36.7 °C)-103 °F (39.4 °C)] 98.8 °F (37.1 °C)  Pulse:  [60-86] 65  Resp:  [16-26] 17  SpO2:  [93 %-99 %] 94 %  BP: (106-155)/(52-68) 107/58     Body mass index is 33.82 kg/m².      Bladder Scan Volume (mL): 90 mL (03/04/22 1450)    Drains       None                   Physical Exam  HENT:      Head: Atraumatic.   Cardiovascular:      Rate and Rhythm: Normal rate.   Pulmonary:      Effort: Pulmonary effort is normal. No respiratory distress.   Abdominal:      Palpations: Abdomen is soft.   Musculoskeletal:         General: Normal range of motion.      Cervical back: Neck supple.   Skin:     General: Skin is warm and dry.   Neurological:      Mental Status: He is alert and oriented to person, place, and time.       Significant Labs:    BMP:  Recent Labs   Lab 03/03/22  2003 03/04/22  1455    136   K 4.3 4.2    97   CO2 25 26   BUN 15 18   CREATININE 1.6* 1.5*   CALCIUM 9.8 9.3       CBC:   Recent Labs   Lab 03/03/22  1851 03/04/22  1455   WBC 10.41 9.10   HGB 12.9* 12.3*   HCT 40.9 38.9*    163       All pertinent labs results from the past 24 hours have been reviewed.    Significant Imaging:  All pertinent imaging results/findings from the past 24 hours have been reviewed.

## 2022-03-05 NOTE — PROGRESS NOTES
[facility] ([place] [address])  Urology  Progress Note    Patient Name: Lukasz Person  MRN: [id number]  Admission Date: 3/3/2022  Hospital Length of Stay: 1 days  Code Status: Full Code   Attending Provider: Billy Bingham MD   Primary Care Physician: Kirk Wilson MD    Subjective:     HPI:  Patient is a 67 yo M with a PMH of DM, HTN, KUMAR, nephrolithiasis s/p left ureteroscopy with laser lithotripsy on 2/25/22 who presents for fevers, chills, nausea. Patient developed fevers, chills, nausea, and poor PO intake last night. He went to urgent care today who sent patient to the ED. He also complains of urinary frequency and urgency. He denies dysuria, hematuria, flank pain, suprapubic pain. CT scan obtained in the ED shows left ureteral stent in good position without hydronephrosis. WBC 10.4. Cr 1.6 (BL ~1.4). UA nitrite positive, 31 RBC, >100WBC, many bacteria, rare yeast. He has received 1L LR and 1g Rocephin in the ED.       Interval History:   Continues to be febrile overnight.   On abx, will f/u cx. Urine growing GNRs.   UOP adequate. Morning labs pending.       Objective:     Temp:  [98.1 °F (36.7 °C)-103 °F (39.4 °C)] 98.8 °F (37.1 °C)  Pulse:  [60-86] 65  Resp:  [16-26] 17  SpO2:  [93 %-99 %] 94 %  BP: (106-155)/(52-68) 107/58     Body mass index is 33.82 kg/m².      Bladder Scan Volume (mL): 90 mL (03/04/22 1450)    Drains       None                   Physical Exam  HENT:      Head: Atraumatic.   Cardiovascular:      Rate and Rhythm: Normal rate.   Pulmonary:      Effort: Pulmonary effort is normal. No respiratory distress.   Abdominal:      Palpations: Abdomen is soft.   Musculoskeletal:         General: Normal range of motion.      Cervical back: Neck supple.   Skin:     General: Skin is warm and dry.   Neurological:      Mental Status: He is alert and oriented to person, place, and time.       Significant Labs:    BMP:  Recent Labs   Lab 03/03/22 2003 03/04/22  1455    136   K 4.3 4.2   CL  101 97   CO2 25 26   BUN 15 18   CREATININE 1.6* 1.5*   CALCIUM 9.8 9.3       CBC:   Recent Labs   Lab 03/03/22  1851 03/04/22  1455   WBC 10.41 9.10   HGB 12.9* 12.3*   HCT 40.9 38.9*    163       All pertinent labs results from the past 24 hours have been reviewed.    Significant Imaging:  All pertinent imaging results/findings from the past 24 hours have been reviewed.                    Assessment/Plan:     * Pyelonephritis  Patient is a 65 yo M with a PMH of DM, HTN, KUMAR, CKD nephrolithiasis s/p left ureteroscopy with laser lithotripsy on 2/25/22 who presents 1 week post-op for fevers, chills, nausea, poor PO intake x 1 day. CT scan obtained in the ED shows left ureteral stent in good position without hydronephrosis. Urology consulted for post-op pyelonephritis.    -IV antibiotics for pyelonephritis  - Recommend IVF  - Follow up urine cultures. Growing GNRs.  tailor abx  - Recommend IVF  - Prelim blood cultures NG.   - Trend Cr.   - No Urologic intervention necessary. Given current pyelonephritis, the ureteral stent should remain in place. Once patient has recovered from this hospital admission and received an appropriate course of antibiotics we will set up outpatient cystoscopy for stent removal.         VTE Risk Mitigation (From admission, onward)         Ordered     enoxaparin injection 40 mg  Daily         03/03/22 2057     IP VTE HIGH RISK PATIENT  Once         03/03/22 2057     Place sequential compression device  Until discontinued         03/03/22 2057     IP VTE HIGH RISK PATIENT  Once         03/03/22 2057     Place sequential compression device  Until discontinued         03/03/22 2057                Justine Dominguez MD  Urology  [facility] ([place]

## 2022-03-05 NOTE — PLAN OF CARE
Pt febrile throughout the shift, tmax 103, diaphoretic with full body chills. Medicine contacted and antibiotics changed. RRT nurse and medicine team to bedside for eval this afternoon, labs drawn and 1 liter LR given. Poor po intake. One episode of emesis this afternoon. No NSAIDs due to ABDI. Will continue to monitor.

## 2022-03-05 NOTE — PROGRESS NOTES
Pharmacokinetic Assessment Follow Up: IV Vancomycin    Therapy with vancomycin complete and/or consult discontinued by provider.  Pharmacy will sign off, please re-consult as needed.    Alexys Eubanks, PharmD, BCPS

## 2022-03-05 NOTE — ASSESSMENT & PLAN NOTE
Patient s/p L nephrolithotripsy and ureteral stent placement on 2/25, presenting to the ED after 2 days of fevers, chills, N/V, and left-sided flank pain. UA consistent with UTI.     - Pt. Febrile to 103.1F max, with WBC 10K, HDS.   - Meeting 1/4 SIRS criteria for fever   - 1L LR given in ED   - Blood cultures collected, NGTD  - CT abd shows L stent in place with perinephric stranding around L kidney, c/w pyelonephritis in setting of infectious UA  - Urine culture pending   - IV ceftriaxone started  - Patient with persistent fevers despite CTX and tylenol prn. Adding IV vanc to broaden coverage for GP organisms in setting of recent stent placement.   - Urology consulted, recommending continued abx treatment and addition of diflucan as UA detected yeast.    - Start fluconazole 200mg PO x14 days   - No acute intervention recommended from urology, will schedule follow up for opt cystoscopy and stent removal pending medical stability.   - 3/4 fevers persisting, CTX --> zosyn, continue vanc and diflucan   - 3/5 urine culture growing GNR; d/c vanc. Continue zosyn and diflucan   -pending susceptibilities.

## 2022-03-05 NOTE — ASSESSMENT & PLAN NOTE
Patient is a 67 yo M with a PMH of DM, HTN, KUMAR, CKD nephrolithiasis s/p left ureteroscopy with laser lithotripsy on 2/25/22 who presents 1 week post-op for fevers, chills, nausea, poor PO intake x 1 day. CT scan obtained in the ED shows left ureteral stent in good position without hydronephrosis. Urology consulted for post-op pyelonephritis.    -IV antibiotics for pyelonephritis  - Recommend IVF  - Follow up urine cultures. Growing GNRs.  tailor abx  - Recommend IVF  - Prelim blood cultures NG.   - Trend Cr.   - No Urologic intervention necessary. Given current pyelonephritis, the ureteral stent should remain in place. Once patient has recovered from this hospital admission and received an appropriate course of antibiotics we will set up outpatient cystoscopy for stent removal.

## 2022-03-05 NOTE — SUBJECTIVE & OBJECTIVE
Interval History: NAEON. HDS, fevers controlled with abx adjustments. Patient reporting improved symptoms. Urine culture growing GNR, continue zosyn, discontinue vanc. Urology following.     Review of Systems   Constitutional:  Positive for appetite change and fever. Negative for activity change, chills and unexpected weight change.   HENT:  Negative for congestion and sore throat.    Respiratory:  Negative for cough, shortness of breath and wheezing.    Cardiovascular:  Negative for chest pain, palpitations and leg swelling.   Gastrointestinal:  Negative for abdominal pain, blood in stool, nausea and vomiting.   Genitourinary:  Negative for dysuria, flank pain and hematuria.   Musculoskeletal:  Negative for arthralgias, myalgias and neck pain.   Skin:  Negative for color change and rash.   Neurological:  Negative for dizziness, seizures and numbness.   Psychiatric/Behavioral:  Negative for hallucinations and suicidal ideas.    Objective:     Vital Signs (Most Recent):  Temp: 99.1 °F (37.3 °C) (03/05/22 1619)  Pulse: 62 (03/05/22 1619)  Resp: 17 (03/05/22 1619)  BP: (!) 117/56 (03/05/22 1619)  SpO2: (!) 94 % (03/05/22 1619)   Vital Signs (24h Range):  Temp:  [98.1 °F (36.7 °C)-102.5 °F (39.2 °C)] 99.1 °F (37.3 °C)  Pulse:  [60-74] 62  Resp:  [17-20] 17  SpO2:  [94 %-99 %] 94 %  BP: (106-144)/(52-66) 117/56     Weight: 113.1 kg (249 lb 5.4 oz)  Body mass index is 33.82 kg/m².    Intake/Output Summary (Last 24 hours) at 3/5/2022 1717  Last data filed at 3/5/2022 0400  Gross per 24 hour   Intake --   Output 600 ml   Net -600 ml      Physical Exam  Constitutional:       General: He is not in acute distress.     Appearance: He is obese. He is not ill-appearing.      Comments: Resting comfortably    HENT:      Head: Normocephalic and atraumatic.      Mouth/Throat:      Mouth: Mucous membranes are moist.      Pharynx: No oropharyngeal exudate.   Eyes:      Extraocular Movements: Extraocular movements intact.      Pupils:  Pupils are equal, round, and reactive to light.   Cardiovascular:      Rate and Rhythm: Normal rate and regular rhythm.      Heart sounds: No murmur heard.  Pulmonary:      Effort: Pulmonary effort is normal.      Breath sounds: Normal breath sounds.   Abdominal:      General: Abdomen is flat. Bowel sounds are normal. There is no distension.      Palpations: Abdomen is soft.      Tenderness: There is no abdominal tenderness. There is no left CVA tenderness.   Musculoskeletal:         General: No swelling or deformity.      Cervical back: Normal range of motion and neck supple.   Skin:     General: Skin is warm and dry.      Findings: No rash.   Neurological:      General: No focal deficit present.      Mental Status: He is alert and oriented to person, place, and time.   Psychiatric:         Behavior: Behavior normal.         Thought Content: Thought content normal.       Significant Labs: All pertinent labs within the past 24 hours have been reviewed.  BMP:   Recent Labs   Lab 03/05/22  0847   *   *   K 3.7   CL 99   CO2 25   BUN 21   CREATININE 1.6*   CALCIUM 9.1     CBC:   Recent Labs   Lab 03/03/22  1851 03/04/22  1455 03/05/22  0847   WBC 10.41 9.10 6.47   HGB 12.9* 12.3* 11.2*   HCT 40.9 38.9* 35.3*    163 148*     Urine Culture:   Recent Labs   Lab 03/03/22 2014   LABURIN GRAM NEGATIVE STEFFI  50,000 - 99,999 cfu/ml  Identification and susceptibility pending  *     Urine Studies:   Recent Labs   Lab 03/03/22 2014   COLORU [place]   APPEARANCEUA Hazy*   PHUR 5.0   SPECGRAV 1.025   PROTEINUA 2+*   GLUCUA 3+*   KETONESU Trace*   BILIRUBINUA Negative   OCCULTUA 2+*   NITRITE Positive*   LEUKOCYTESUR 3+*   RBCUA 31*   WBCUA >100*   BACTERIA Many*   HYALINECASTS 53*       Significant Imaging: I have reviewed all pertinent imaging results/findings within the past 24 hours.

## 2022-03-05 NOTE — PLAN OF CARE
Problem: UTI (Urinary Tract Infection)  Goal: Improved Infection Symptoms  Outcome: Ongoing, Progressing     Problem: Fever  Goal: Body Temperature in Desired Range  Outcome: Ongoing, Progressing     Problem: Fluid and Electrolyte Imbalance (Acute Kidney Injury/Impairment)  Goal: Fluid and Electrolyte Balance  Outcome: Ongoing, Progressing     Problem: Oral Intake Inadequate (Acute Kidney Injury/Impairment)  Goal: Optimal Nutrition Intake  Outcome: Ongoing, Progressing     Problem: Renal Function Impairment (Acute Kidney Injury/Impairment)  Goal: Effective Renal Function  Outcome: Ongoing, Progressing     Problem: Fall Injury Risk  Goal: Absence of Fall and Fall-Related Injury  Outcome: Ongoing, Progressing     Problem: Diabetes Comorbidity  Goal: Blood Glucose Level Within Targeted Range  Outcome: Ongoing, Progressing     Problem: Adult Inpatient Plan of Care  Goal: Absence of Hospital-Acquired Illness or Injury  Outcome: Ongoing, Progressing     Problem: Adult Inpatient Plan of Care  Goal: Optimal Comfort and Wellbeing  Outcome: Ongoing, Progressing     Problem: Adult Inpatient Plan of Care  Goal: Readiness for Transition of Care  Outcome: Ongoing, Progressing

## 2022-03-05 NOTE — PROGRESS NOTES
[facility] - Telemetry [facility] ([place] [address])  [facility] Medicine  Progress Note    Patient Name: Lukasz Person  MRN: [id number]  Patient Class: IP- Inpatient   Admission Date: 3/3/2022  Length of Stay: 1 days  Attending Physician: Billy Bingham MD  Primary Care Provider: Kirk Wilson MD        Subjective:     Principal Problem:Pyelonephritis        HPI:  65 yo M with PMHx DM2, HTN, HLD, KUMAR, and nephrolithiasis s/p recent lithotripsy with stent placement (2/25) who presents to the ED from urgent care for fever x1 day. Pt. Reports that he developed subjective fevers/chills last night. He has had assocaited nausea and malaise. He denies any abdominal pain, vomiting, cough, SOB, or nasal congestion. Pt. Reports that he had been feeling well initally after his lithotripsy with only minor hematuria that has since resolved. At urgent care appointment, pt; was noted to have U/A consistent with UTI as well as a fever of 102.3, so he was referred to the ED for further treatment.      Overview/Hospital Course:  Mr. Person placed in observation for acute pyelonephritis. Workup in ED consistent with UTI. CT renal study revealed left perinephric fat stranding indicating pyelonephritis. The patient persistently febrile up to 103 F despite initiating IV rocephin and tylenol for fever control. As the patient is s/p stent placement on 2/25, abx coverage was broadened to include gram pos organisms, IV vanc added. Blood cultures collected, NGTD.  Urology consulted and evaluated patient, agreeing with abx treatment and also recommending initiation of diflucan for yeast detected in UA. No urologic intervention necessary. Given current pyelonephritis, the ureteral stent should remain in place. Once patient has recovered from this hospital admission and received an appropriate course of antibiotics we will set up outpatient cystoscopy for stent removal. CMP revealing minor ABDI with Cr 1.6 (baseline ~1.3). 1L LR IVF given in the ED. Trend BMP.  3/4 rocephin replaced with zosyn as fevers persisted. Patient vitals improved on adjusted abx regimen, fevers controlled. Urine cultures resulted with GNR 3/5, continue zosyn, vanc discontinued. Discharge pending susceptibilities.       Interval History: NAEON. HDS, fevers controlled with abx adjustments. Patient reporting improved symptoms. Urine culture growing GNR, continue zosyn, discontinue vanc. Urology following.     Review of Systems   Constitutional:  Positive for appetite change and fever. Negative for activity change, chills and unexpected weight change.   HENT:  Negative for congestion and sore throat.    Respiratory:  Negative for cough, shortness of breath and wheezing.    Cardiovascular:  Negative for chest pain, palpitations and leg swelling.   Gastrointestinal:  Negative for abdominal pain, blood in stool, nausea and vomiting.   Genitourinary:  Negative for dysuria, flank pain and hematuria.   Musculoskeletal:  Negative for arthralgias, myalgias and neck pain.   Skin:  Negative for color change and rash.   Neurological:  Negative for dizziness, seizures and numbness.   Psychiatric/Behavioral:  Negative for hallucinations and suicidal ideas.    Objective:     Vital Signs (Most Recent):  Temp: 99.1 °F (37.3 °C) (03/05/22 1619)  Pulse: 62 (03/05/22 1619)  Resp: 17 (03/05/22 1619)  BP: (!) 117/56 (03/05/22 1619)  SpO2: (!) 94 % (03/05/22 1619)   Vital Signs (24h Range):  Temp:  [98.1 °F (36.7 °C)-102.5 °F (39.2 °C)] 99.1 °F (37.3 °C)  Pulse:  [60-74] 62  Resp:  [17-20] 17  SpO2:  [94 %-99 %] 94 %  BP: (106-144)/(52-66) 117/56     Weight: 113.1 kg (249 lb 5.4 oz)  Body mass index is 33.82 kg/m².    Intake/Output Summary (Last 24 hours) at 3/5/2022 3688  Last data filed at 3/5/2022 0400  Gross per 24 hour   Intake --   Output 600 ml   Net -600 ml      Physical Exam  Constitutional:       General: He is not in acute distress.     Appearance: He is obese. He is not ill-appearing.      Comments: Resting  comfortably    HENT:      Head: Normocephalic and atraumatic.      Mouth/Throat:      Mouth: Mucous membranes are moist.      Pharynx: No oropharyngeal exudate.   Eyes:      Extraocular Movements: Extraocular movements intact.      Pupils: Pupils are equal, round, and reactive to light.   Cardiovascular:      Rate and Rhythm: Normal rate and regular rhythm.      Heart sounds: No murmur heard.  Pulmonary:      Effort: Pulmonary effort is normal.      Breath sounds: Normal breath sounds.   Abdominal:      General: Abdomen is flat. Bowel sounds are normal. There is no distension.      Palpations: Abdomen is soft.      Tenderness: There is no abdominal tenderness. There is no left CVA tenderness.   Musculoskeletal:         General: No swelling or deformity.      Cervical back: Normal range of motion and neck supple.   Skin:     General: Skin is warm and dry.      Findings: No rash.   Neurological:      General: No focal deficit present.      Mental Status: He is alert and oriented to person, place, and time.   Psychiatric:         Behavior: Behavior normal.         Thought Content: Thought content normal.       Significant Labs: All pertinent labs within the past 24 hours have been reviewed.  BMP:   Recent Labs   Lab 03/05/22  0847   *   *   K 3.7   CL 99   CO2 25   BUN 21   CREATININE 1.6*   CALCIUM 9.1     CBC:   Recent Labs   Lab 03/03/22  1851 03/04/22  1455 03/05/22  0847   WBC 10.41 9.10 6.47   HGB 12.9* 12.3* 11.2*   HCT 40.9 38.9* 35.3*    163 148*     Urine Culture:   Recent Labs   Lab 03/03/22 2014   LABURIN GRAM NEGATIVE STEFFI  50,000 - 99,999 cfu/ml  Identification and susceptibility pending  *     Urine Studies:   Recent Labs   Lab 03/03/22 2014   COLORU [place]   APPEARANCEUA Hazy*   PHUR 5.0   SPECGRAV 1.025   PROTEINUA 2+*   GLUCUA 3+*   KETONESU Trace*   BILIRUBINUA Negative   OCCULTUA 2+*   NITRITE Positive*   LEUKOCYTESUR 3+*   RBCUA 31*   WBCUA >100*   BACTERIA Many*   HYALINECASTS  53*       Significant Imaging: I have reviewed all pertinent imaging results/findings within the past 24 hours.      Assessment/Plan:      * Pyelonephritis  Patient s/p L nephrolithotripsy and ureteral stent placement on 2/25, presenting to the ED after 2 days of fevers, chills, N/V, and left-sided flank pain. UA consistent with UTI.     - Pt. Febrile to 103.1F max, with WBC 10K, HDS.   - Meeting 1/4 SIRS criteria for fever   - 1L LR given in ED   - Blood cultures collected, NGTD  - CT abd shows L stent in place with perinephric stranding around L kidney, c/w pyelonephritis in setting of infectious UA  - Urine culture pending   - IV ceftriaxone started  - Patient with persistent fevers despite CTX and tylenol prn. Adding IV vanc to broaden coverage for GP organisms in setting of recent stent placement.   - Urology consulted, recommending continued abx treatment and addition of diflucan as UA detected yeast.    - Start fluconazole 200mg PO x14 days   - No acute intervention recommended from urology, will schedule follow up for opt cystoscopy and stent removal pending medical stability.   - 3/4 fevers persisting, CTX --> zosyn, continue vanc and diflucan   - 3/5 urine culture growing GNR; d/c vanc. Continue zosyn and diflucan   -pending susceptibilities.    Obesity (BMI 30.0-34.9)  Body mass index is 33.82 kg/m². Morbid obesity complicates all aspects of disease management from diagnostic modalities to treatment. Weight loss encouraged and health benefits explained to patient.      Hyperlipidemia  - continue statin       S/P ureteral stent placement  -Stent in place per CT scan, urology consulted. No intervention recommended      Nephrolithiasis  -S/p L kidney lithotripsy with stent. Non-obstructive punctate stone on left, asymptomatic non-obstructing stone on R.   - Per urology, no intervention at this time       Chronic left shoulder pain  - no acute complaints of pain   - tylenol prn for now       BPH with urinary  obstruction  - continue flomax, oxybutynin       Acute renal failure superimposed on stage 3a chronic kidney disease  - Cr 1.6 on admit, baseline ~1.3.   - 1L LR bolus given in ED, trend bmp   - Continue to monitor and avoid nephrotoxic medications  - Treating acute pyelo   - fluid repletion as indicated     Male hypogonadism  - testosterone injections daily at home      Hyperlipidemia associated with type 2 diabetes mellitus  -Continue home fenofibrate      Hypertension associated with diabetes  -Holding home lisinopril-HCTZ as patient at risk for sepsis and appearing volume down, also with mild ABDI   - Given 1L LR in ED, pressures stable overnight. Will begin amlodipine 5mg daily for pressure control while holding home nephrotoxic agents        KUMAR on CPAP  -home CPAP QHS  - patient not utilizing CPAP at night inpatient, prefers supplemental O2 with NC -- ok to continue      Type 2 diabetes mellitus with kidney complication, without long-term current use of insulin  -A1c 7.2 2/2022  - on admit   -Hold home oral meds   -Weight-based insulin regimen while inpatient: Levemir 28U nightly, Novolog 9U TIDWM, low dose SSI  -Diabetic diet        VTE Risk Mitigation (From admission, onward)         Ordered     enoxaparin injection 40 mg  Daily         03/03/22 2057     IP VTE HIGH RISK PATIENT  Once         03/03/22 2057     Place sequential compression device  Until discontinued         03/03/22 2057     IP VTE HIGH RISK PATIENT  Once         03/03/22 2057     Place sequential compression device  Until discontinued         03/03/22 2057                Discharge Planning   ANDRZEJ: 3/7/2022     Code Status: Full Code   Is the patient medically ready for discharge?: No    Reason for patient still in hospital (select all that apply): Patient trending condition, Laboratory test, Treatment and Consult recommendations  Discharge Plan A: Home with family                  Casandra Thorpe PA-C  Department of Hospital Medicine    [facility] - Tele[facility]down ([place])

## 2022-03-05 NOTE — ASSESSMENT & PLAN NOTE
-home CPAP QHS  - patient not utilizing CPAP at night inpatient, prefers supplemental O2 with NC -- ok to continue

## 2022-03-05 NOTE — ASSESSMENT & PLAN NOTE
- Cr 1.6 on admit, baseline ~1.3.   - 1L LR bolus given in ED, trend bmp   - Continue to monitor and avoid nephrotoxic medications  - Treating acute pyelo   - fluid repletion as indicated

## 2022-03-06 VITALS
TEMPERATURE: 99 F | WEIGHT: 249.31 LBS | HEART RATE: 95 BPM | BODY MASS INDEX: 33.77 KG/M2 | RESPIRATION RATE: 17 BRPM | SYSTOLIC BLOOD PRESSURE: 132 MMHG | OXYGEN SATURATION: 94 % | DIASTOLIC BLOOD PRESSURE: 69 MMHG | HEIGHT: 72 IN

## 2022-03-06 LAB
ANION GAP SERPL CALC-SCNC: 13 MMOL/L (ref 8–16)
BACTERIA UR CULT: ABNORMAL
BASOPHILS # BLD AUTO: 0.02 K/UL (ref 0–0.2)
BASOPHILS NFR BLD: 0.4 % (ref 0–1.9)
BUN SERPL-MCNC: 19 MG/DL (ref 8–23)
CALCIUM SERPL-MCNC: 9.6 MG/DL (ref 8.7–10.5)
CHLORIDE SERPL-SCNC: 104 MMOL/L (ref 95–110)
CO2 SERPL-SCNC: 22 MMOL/L (ref 23–29)
CREAT SERPL-MCNC: 1.3 MG/DL (ref 0.5–1.4)
DIFFERENTIAL METHOD: ABNORMAL
EOSINOPHIL # BLD AUTO: 0.1 K/UL (ref 0–0.5)
EOSINOPHIL NFR BLD: 2 % (ref 0–8)
ERYTHROCYTE [DISTWIDTH] IN BLOOD BY AUTOMATED COUNT: 13.7 % (ref 11.5–14.5)
EST. GFR  (AFRICAN AMERICAN): >60 ML/MIN/1.73 M^2
EST. GFR  (NON AFRICAN AMERICAN): 56.5 ML/MIN/1.73 M^2
GLUCOSE SERPL-MCNC: 103 MG/DL (ref 70–110)
HCT VFR BLD AUTO: 39.4 % (ref 40–54)
HGB BLD-MCNC: 12.4 G/DL (ref 14–18)
IMM GRANULOCYTES # BLD AUTO: 0.01 K/UL (ref 0–0.04)
IMM GRANULOCYTES NFR BLD AUTO: 0.2 % (ref 0–0.5)
LYMPHOCYTES # BLD AUTO: 1.1 K/UL (ref 1–4.8)
LYMPHOCYTES NFR BLD: 22.6 % (ref 18–48)
MCH RBC QN AUTO: 29.2 PG (ref 27–31)
MCHC RBC AUTO-ENTMCNC: 31.5 G/DL (ref 32–36)
MCV RBC AUTO: 93 FL (ref 82–98)
MONOCYTES # BLD AUTO: 0.5 K/UL (ref 0.3–1)
MONOCYTES NFR BLD: 10.6 % (ref 4–15)
NEUTROPHILS # BLD AUTO: 3.2 K/UL (ref 1.8–7.7)
NEUTROPHILS NFR BLD: 64.2 % (ref 38–73)
NRBC BLD-RTO: 0 /100 WBC
PLATELET # BLD AUTO: 179 K/UL (ref 150–450)
PMV BLD AUTO: 10.5 FL (ref 9.2–12.9)
POCT GLUCOSE: 114 MG/DL (ref 70–110)
POCT GLUCOSE: 166 MG/DL (ref 70–110)
POCT GLUCOSE: 191 MG/DL (ref 70–110)
POTASSIUM SERPL-SCNC: 3.8 MMOL/L (ref 3.5–5.1)
RBC # BLD AUTO: 4.24 M/UL (ref 4.6–6.2)
SODIUM SERPL-SCNC: 139 MMOL/L (ref 136–145)
WBC # BLD AUTO: 4.99 K/UL (ref 3.9–12.7)

## 2022-03-06 PROCEDURE — 27000221 HC OXYGEN, UP TO 24 HOURS

## 2022-03-06 PROCEDURE — 94761 N-INVAS EAR/PLS OXIMETRY MLT: CPT

## 2022-03-06 PROCEDURE — 63600175 PHARM REV CODE 636 W HCPCS

## 2022-03-06 PROCEDURE — 80048 BASIC METABOLIC PNL TOTAL CA: CPT

## 2022-03-06 PROCEDURE — 93010 ELECTROCARDIOGRAM REPORT: CPT | Mod: ,,, | Performed by: INTERNAL MEDICINE

## 2022-03-06 PROCEDURE — 25000003 PHARM REV CODE 250

## 2022-03-06 PROCEDURE — 93010 EKG 12-LEAD: ICD-10-PCS | Mod: ,,, | Performed by: INTERNAL MEDICINE

## 2022-03-06 PROCEDURE — 85025 COMPLETE CBC W/AUTO DIFF WBC: CPT

## 2022-03-06 PROCEDURE — 99900035 HC TECH TIME PER 15 MIN (STAT)

## 2022-03-06 PROCEDURE — 25000242 PHARM REV CODE 250 ALT 637 W/ HCPCS: Performed by: HOSPITALIST

## 2022-03-06 PROCEDURE — 94660 CPAP INITIATION&MGMT: CPT

## 2022-03-06 PROCEDURE — 99239 HOSP IP/OBS DSCHRG MGMT >30: CPT | Mod: ,,, | Performed by: INTERNAL MEDICINE

## 2022-03-06 PROCEDURE — 25000003 PHARM REV CODE 250: Performed by: HOSPITALIST

## 2022-03-06 PROCEDURE — 93005 ELECTROCARDIOGRAM TRACING: CPT

## 2022-03-06 PROCEDURE — 99239 PR HOSPITAL DISCHARGE DAY,>30 MIN: ICD-10-PCS | Mod: ,,, | Performed by: INTERNAL MEDICINE

## 2022-03-06 PROCEDURE — 36415 COLL VENOUS BLD VENIPUNCTURE: CPT

## 2022-03-06 RX ORDER — CIPROFLOXACIN 500 MG/1
500 TABLET ORAL 2 TIMES DAILY
Qty: 32 TABLET | Refills: 0 | Status: SHIPPED | OUTPATIENT
Start: 2022-03-06 | End: 2022-03-22

## 2022-03-06 RX ORDER — CIPROFLOXACIN 2 MG/ML
400 INJECTION, SOLUTION INTRAVENOUS
Status: DISCONTINUED | OUTPATIENT
Start: 2022-03-06 | End: 2022-03-06 | Stop reason: HOSPADM

## 2022-03-06 RX ORDER — LEVOFLOXACIN 5 MG/ML
750 INJECTION, SOLUTION INTRAVENOUS
Status: DISCONTINUED | OUTPATIENT
Start: 2022-03-06 | End: 2022-03-06

## 2022-03-06 RX ADMIN — TAMSULOSIN HYDROCHLORIDE 0.4 MG: 0.4 CAPSULE ORAL at 09:03

## 2022-03-06 RX ADMIN — CIPROFLOXACIN 400 MG: 2 INJECTION, SOLUTION INTRAVENOUS at 10:03

## 2022-03-06 RX ADMIN — Medication 1 CAPSULE: at 09:03

## 2022-03-06 RX ADMIN — SODIUM CHLORIDE, SODIUM LACTATE, POTASSIUM CHLORIDE, AND CALCIUM CHLORIDE 1000 ML: .6; .31; .03; .02 INJECTION, SOLUTION INTRAVENOUS at 10:03

## 2022-03-06 RX ADMIN — INSULIN ASPART 9 UNITS: 100 INJECTION, SOLUTION INTRAVENOUS; SUBCUTANEOUS at 02:03

## 2022-03-06 RX ADMIN — ACETAMINOPHEN 1000 MG: 500 TABLET ORAL at 03:03

## 2022-03-06 RX ADMIN — INSULIN ASPART 9 UNITS: 100 INJECTION, SOLUTION INTRAVENOUS; SUBCUTANEOUS at 10:03

## 2022-03-06 RX ADMIN — AMLODIPINE BESYLATE 5 MG: 5 TABLET ORAL at 09:03

## 2022-03-06 RX ADMIN — FENOFIBRATE 160 MG: 160 TABLET ORAL at 09:03

## 2022-03-06 RX ADMIN — ALLOPURINOL 100 MG: 100 TABLET ORAL at 09:03

## 2022-03-06 RX ADMIN — ATORVASTATIN CALCIUM 40 MG: 40 TABLET, FILM COATED ORAL at 09:03

## 2022-03-06 NOTE — PLAN OF CARE
Problem: Adult Inpatient Plan of Care  Goal: Plan of Care Review  3/5/2022 1934 by Iris Arenas RN  Outcome: Ongoing, Progressing  3/5/2022 1934 by Iris Arenas RN  Outcome: Ongoing, Progressing  Goal: Patient-Specific Goal (Individualized)  3/5/2022 1934 by Iris Arenas RN  Outcome: Ongoing, Progressing  3/5/2022 1934 by Iris Arenas RN  Outcome: Ongoing, Progressing  Goal: Absence of Hospital-Acquired Illness or Injury  3/5/2022 1934 by Iris Arenas RN  Outcome: Ongoing, Progressing  3/5/2022 1934 by Iris Arenas RN  Outcome: Ongoing, Progressing  Goal: Optimal Comfort and Wellbeing  3/5/2022 1934 by Iris Arenas RN  Outcome: Ongoing, Progressing  3/5/2022 1934 by Iris Arenas RN  Outcome: Ongoing, Progressing  Goal: Readiness for Transition of Care  3/5/2022 1934 by Iris Arenas RN  Outcome: Ongoing, Progressing  3/5/2022 1934 by Iris Arenas RN  Outcome: Ongoing, Progressing     Problem: Diabetes Comorbidity  Goal: Blood Glucose Level Within Targeted Range  3/5/2022 1934 by Iris Arenas RN  Outcome: Ongoing, Progressing  3/5/2022 1934 by Iris Arenas RN  Outcome: Ongoing, Progressing     Problem: Fall Injury Risk  Goal: Absence of Fall and Fall-Related Injury  3/5/2022 1934 by Iris Arenas RN  Outcome: Ongoing, Progressing  3/5/2022 1934 by Iris Arenas RN  Outcome: Ongoing, Progressing     Problem: UTI (Urinary Tract Infection)  Goal: Improved Infection Symptoms  3/5/2022 1934 by Iris Arenas RN  Outcome: Ongoing, Progressing  3/5/2022 1934 by Iris Arenas RN  Outcome: Ongoing, Progressing     Problem: Fever  Goal: Body Temperature in Desired Range  3/5/2022 1934 by Iris Arenas RN  Outcome: Ongoing, Progressing  3/5/2022 1934 by Iris Arenas RN  Outcome: Ongoing, Progressing     Problem: Gas Exchange Impaired  Goal: Optimal Gas Exchange  3/5/2022 1934 by Iris Arenas RN  Outcome: Ongoing, Progressing  3/5/2022 1934 by Iris Arenas RN  Outcome: Ongoing, Progressing      Problem: Fluid and Electrolyte Imbalance (Acute Kidney Injury/Impairment)  Goal: Fluid and Electrolyte Balance  3/5/2022 1934 by Iris Arenas RN  Outcome: Ongoing, Progressing  3/5/2022 1934 by Iris Arenas RN  Outcome: Ongoing, Progressing     Problem: Oral Intake Inadequate (Acute Kidney Injury/Impairment)  Goal: Optimal Nutrition Intake  3/5/2022 1934 by Iris Arenas RN  Outcome: Ongoing, Progressing  3/5/2022 1934 by Iris Arenas RN  Outcome: Ongoing, Progressing     Problem: Renal Function Impairment (Acute Kidney Injury/Impairment)  Goal: Effective Renal Function  3/5/2022 1934 by Iris Arenas RN  Outcome: Ongoing, Progressing  3/5/2022 1934 by Iris Arenas RN  Outcome: Ongoing, Progressing

## 2022-03-06 NOTE — SUBJECTIVE & OBJECTIVE
Interval History:   Fever curve downtrending.   Patient reports subjective improvement in overall health.       Review of Systems  Objective:     Temp:  [98.2 °F (36.8 °C)-100.4 °F (38 °C)] 98.2 °F (36.8 °C)  Pulse:  [] 110  Resp:  [17-20] 17  SpO2:  [94 %-96 %] 96 %  BP: (117-161)/(56-85) 140/85     Body mass index is 33.82 kg/m².    Date 03/06/22 0700 - 03/07/22 0659   Shift 3242-7245 0742-6466 1396-5248 24 Hour Total   INTAKE   Shift Total(mL/kg)       OUTPUT   Urine(mL/kg/hr) 400   400   Shift Total(mL/kg) 400(3.5)   400(3.5)   Weight (kg) 113.1 113.1 113.1 113.1     Bladder Scan Volume (mL): 90 mL (03/04/22 1450)    Drains       None                   Physical Exam  HENT:      Head: Atraumatic.   Cardiovascular:      Rate and Rhythm: Normal rate.   Pulmonary:      Effort: Pulmonary effort is normal. No respiratory distress.   Abdominal:      Palpations: Abdomen is soft.   Musculoskeletal:         General: Normal range of motion.      Cervical back: Neck supple.   Skin:     General: Skin is warm and dry.   Neurological:      Mental Status: He is alert and oriented to person, place, and time.       Significant Labs:    BMP:  Recent Labs   Lab 03/03/22 2003 03/04/22  1455 03/05/22  0847    136 134*   K 4.3 4.2 3.7    97 99   CO2 25 26 25   BUN 15 18 21   CREATININE 1.6* 1.5* 1.6*   CALCIUM 9.8 9.3 9.1       CBC:   Recent Labs   Lab 03/03/22  1851 03/04/22  1455 03/05/22  0847   WBC 10.41 9.10 6.47   HGB 12.9* 12.3* 11.2*   HCT 40.9 38.9* 35.3*    163 148*       Urine Culture:   Recent Labs   Lab 03/03/22  1653 03/03/22  2014   LABURIN GRAM NEGATIVE STEFFI  >100,000 cfu/ml  Identification and susceptibility pending  * KLEBSIELLA AEROGENES  50,000 - 99,999 cfu/ml  *       Significant Imaging:  All pertinent imaging results/findings from the past 24 hours have been reviewed.

## 2022-03-06 NOTE — PROGRESS NOTES
[facility] - Tele[facility] Stepdown ([place] [address])  Urology  Progress Note    Patient Name: Lukasz Person  MRN: [id number]  Admission Date: 3/3/2022  Hospital Length of Stay: 2 days  Code Status: Full Code   Attending Provider: Billy Bingham MD   Primary Care Physician: Kirk Wilson MD    Subjective:     HPI:  Patient is a 67 yo M with a PMH of DM, HTN, KUMAR, nephrolithiasis s/p left ureteroscopy with laser lithotripsy on 2/25/22 who presents for fevers, chills, nausea. Patient developed fevers, chills, nausea, and poor PO intake last night. He went to urgent care today who sent patient to the ED. He also complains of urinary frequency and urgency. He denies dysuria, hematuria, flank pain, suprapubic pain. CT scan obtained in the ED shows left ureteral stent in good position without hydronephrosis. WBC 10.4. Cr 1.6 (BL ~1.4). UA nitrite positive, 31 RBC, >100WBC, many bacteria, rare yeast. He has received 1L LR and 1g Rocephin in the ED.       Interval History:   Fever curve downtrending.   Patient reports subjective improvement in overall health.       Review of Systems  Objective:     Temp:  [98.2 °F (36.8 °C)-100.4 °F (38 °C)] 98.2 °F (36.8 °C)  Pulse:  [] 110  Resp:  [17-20] 17  SpO2:  [94 %-96 %] 96 %  BP: (117-161)/(56-85) 140/85     Body mass index is 33.82 kg/m².    Date 03/06/22 0700 - 03/07/22 0659   Shift 9083-3812 1940-8478 4689-2961 24 Hour Total   INTAKE   Shift Total(mL/kg)       OUTPUT   Urine(mL/kg/hr) 400   400   Shift Total(mL/kg) 400(3.5)   400(3.5)   Weight (kg) 113.1 113.1 113.1 113.1     Bladder Scan Volume (mL): 90 mL (03/04/22 1450)    Drains       None                   Physical Exam  HENT:      Head: Atraumatic.   Cardiovascular:      Rate and Rhythm: Normal rate.   Pulmonary:      Effort: Pulmonary effort is normal. No respiratory distress.   Abdominal:      Palpations: Abdomen is soft.   Musculoskeletal:         General: Normal range of motion.      Cervical back: Neck supple.   Skin:      General: Skin is warm and dry.   Neurological:      Mental Status: He is alert and oriented to person, place, and time.       Significant Labs:    BMP:  Recent Labs   Lab 03/03/22 2003 03/04/22  1455 03/05/22  0847    136 134*   K 4.3 4.2 3.7    97 99   CO2 25 26 25   BUN 15 18 21   CREATININE 1.6* 1.5* 1.6*   CALCIUM 9.8 9.3 9.1       CBC:   Recent Labs   Lab 03/03/22  1851 03/04/22  1455 03/05/22  0847   WBC 10.41 9.10 6.47   HGB 12.9* 12.3* 11.2*   HCT 40.9 38.9* 35.3*    163 148*       Urine Culture:   Recent Labs   Lab 03/03/22 1653 03/03/22 2014   LABURIN GRAM NEGATIVE STEFFI  >100,000 cfu/ml  Identification and susceptibility pending  * KLEBSIELLA AEROGENES  50,000 - 99,999 cfu/ml  *       Significant Imaging:  All pertinent imaging results/findings from the past 24 hours have been reviewed.                    Assessment/Plan:     * Pyelonephritis  Patient is a 65 yo M with a PMH of DM, HTN, KUMAR, CKD nephrolithiasis s/p left ureteroscopy with laser lithotripsy on 2/25/22 who presents 1 week post-op for fevers, chills, nausea, poor PO intake x 1 day. CT scan obtained in the ED shows left ureteral stent in good position without hydronephrosis. Urology consulted for post-op pyelonephritis.    - Urine cultures growing klebsiella sensitive to cipro. Can likely transition to PO cipro. Will likely need 2 weeks of abx.  - Recommend IVF  - Prelim blood cultures NG.   - Trend Cr.   - No Urologic intervention necessary. Given current pyelonephritis, the ureteral stent should remain in place. Once patient has recovered from this hospital admission and received an appropriate course of antibiotics we will set up outpatient cystoscopy for stent removal. Will try to pull stent while patient is on antibiotics.         VTE Risk Mitigation (From admission, onward)         Ordered     enoxaparin injection 40 mg  Daily         03/03/22 2057     IP VTE HIGH RISK PATIENT  Once         03/03/22 2057     Place  sequential compression device  Until discontinued         03/03/22 2057     IP VTE HIGH RISK PATIENT  Once         03/03/22 2057     Place sequential compression device  Until discontinued         03/03/22 2057                Justine Dominguez MD  Urology  [facility] - Telemetry Stepdown ([place])

## 2022-03-06 NOTE — ASSESSMENT & PLAN NOTE
Patient is a 65 yo M with a PMH of DM, HTN, KUMAR, CKD nephrolithiasis s/p left ureteroscopy with laser lithotripsy on 2/25/22 who presents 1 week post-op for fevers, chills, nausea, poor PO intake x 1 day. CT scan obtained in the ED shows left ureteral stent in good position without hydronephrosis. Urology consulted for post-op pyelonephritis.    - Urine cultures growing klebsiella sensitive to cipro. Can likely transition to PO cipro.   - Recommend IVF  - Prelim blood cultures NG.   - Trend Cr.   - No Urologic intervention necessary. Given current pyelonephritis, the ureteral stent should remain in place. Once patient has recovered from this hospital admission and received an appropriate course of antibiotics we will set up outpatient cystoscopy for stent removal.

## 2022-03-06 NOTE — PLAN OF CARE
[facility] ([facility])  Discharge Final Note    Primary Care Provider: Kirk Wilson MD    Expected Discharge Date: 3/6/2022    Final Discharge Note (most recent)       Final Note - 03/06/22 1541          Final Note    Assessment Type Final Discharge Note (P)      Anticipated Discharge Disposition Home or Self Care (P)         Post-Acute Status    Post-Acute Authorization Other (P)      Other Status No Post-Acute Service Needs (P)      Discharge Delays None known at this time (P)                      Important Message from Medicare             Contact Info       Kirk Wilson MD   Specialty: Internal Medicine   Relationship: PCP - General    [address] [facility] [address]   Phone: [phone]       Next Steps: Follow up    Instructions: The nurse with Dr. Wilson's office will contact you to schedule a hospital follow up visit.          Kathryn Walker LMSW  Case Management Social Worker   [facility], [address]

## 2022-03-06 NOTE — PLAN OF CARE
Problem: Adult Inpatient Plan of Care  Goal: Plan of Care Review  3/6/2022 1247 by Iris Arenas RN  Outcome: Ongoing, Progressing  3/6/2022 1247 by Iris Arenas RN  Outcome: Ongoing, Progressing  Goal: Patient-Specific Goal (Individualized)  3/6/2022 1247 by Iris Arenas RN  Outcome: Ongoing, Progressing  3/6/2022 1247 by Iris Arenas RN  Outcome: Ongoing, Progressing  Goal: Absence of Hospital-Acquired Illness or Injury  3/6/2022 1247 by Iris Arenas RN  Outcome: Ongoing, Progressing  3/6/2022 1247 by Iris Arenas RN  Outcome: Ongoing, Progressing  Goal: Optimal Comfort and Wellbeing  3/6/2022 1247 by Iris Arenas RN  Outcome: Ongoing, Progressing  3/6/2022 1247 by Iris Arenas RN  Outcome: Ongoing, Progressing  Goal: Readiness for Transition of Care  3/6/2022 1247 by Iris Arenas RN  Outcome: Ongoing, Progressing  3/6/2022 1247 by Iris Arenas RN  Outcome: Ongoing, Progressing     Problem: Diabetes Comorbidity  Goal: Blood Glucose Level Within Targeted Range  3/6/2022 1247 by Iris Arenas RN  Outcome: Ongoing, Progressing  3/6/2022 1247 by Iris Arenas RN  Outcome: Ongoing, Progressing     Problem: Fall Injury Risk  Goal: Absence of Fall and Fall-Related Injury  3/6/2022 1247 by Iris Arenas RN  Outcome: Ongoing, Progressing  3/6/2022 1247 by Iris Arenas RN  Outcome: Ongoing, Progressing     Problem: UTI (Urinary Tract Infection)  Goal: Improved Infection Symptoms  3/6/2022 1247 by Iris Arenas RN  Outcome: Ongoing, Progressing  3/6/2022 1247 by Iris Arenas RN  Outcome: Ongoing, Progressing     Problem: Fever  Goal: Body Temperature in Desired Range  3/6/2022 1247 by Iris Arenas RN  Outcome: Ongoing, Progressing  3/6/2022 1247 by Iris Arenas RN  Outcome: Ongoing, Progressing     Problem: Gas Exchange Impaired  Goal: Optimal Gas Exchange  3/6/2022 1247 by Iris Arenas RN  Outcome: Ongoing, Progressing  3/6/2022 1247 by Iris Arenas RN  Outcome: Ongoing, Progressing      Problem: Fluid and Electrolyte Imbalance (Acute Kidney Injury/Impairment)  Goal: Fluid and Electrolyte Balance  3/6/2022 1247 by Iris Arenas RN  Outcome: Ongoing, Progressing  3/6/2022 1247 by Iris Arenas RN  Outcome: Ongoing, Progressing     Problem: Oral Intake Inadequate (Acute Kidney Injury/Impairment)  Goal: Optimal Nutrition Intake  3/6/2022 1247 by Iris Arenas RN  Outcome: Ongoing, Progressing  3/6/2022 1247 by Iris Arenas RN  Outcome: Ongoing, Progressing     Problem: Renal Function Impairment (Acute Kidney Injury/Impairment)  Goal: Effective Renal Function  3/6/2022 1247 by Iris Arenas RN  Outcome: Ongoing, Progressing  3/6/2022 1247 by Iris Arenas RN  Outcome: Ongoing, Progressing

## 2022-03-07 ENCOUNTER — TELEPHONE (OUTPATIENT)
Dept: URGENT CARE | Facility: CLINIC | Age: 67
End: 2022-03-07
Payer: MEDICARE

## 2022-03-07 ENCOUNTER — TELEPHONE (OUTPATIENT)
Dept: UROLOGY | Facility: CLINIC | Age: 67
End: 2022-03-07
Payer: MEDICARE

## 2022-03-07 DIAGNOSIS — N13.30 HYDRONEPHROSIS OF LEFT KIDNEY: Primary | ICD-10-CM

## 2022-03-07 DIAGNOSIS — N13.5 URETERAL OBSTRUCTION, LEFT: Primary | ICD-10-CM

## 2022-03-07 LAB — BACTERIA UR CULT: ABNORMAL

## 2022-03-07 NOTE — TELEPHONE ENCOUNTER
Called and spoke with patient. He was discharged from hospital and is feeling much better. Advised to reach out if he needs anything. Also instructed to call [facility] nurse on call or anywhere care as needed.

## 2022-03-07 NOTE — TELEPHONE ENCOUNTER
1716-I spoke to pt Lukasz and scheduled cysto & stent removal for Wed 3/16/22 at 1400.  Pt uses the Portal and VU.    ----- Message from Justine Dominguez MD sent at 3/6/2022  8:56 AM CST -----  Please schedule patient to see Dr. Hughes in procedure for stent pull in the working week on 03/14-3/18.     TY  AF

## 2022-03-08 ENCOUNTER — PATIENT OUTREACH (OUTPATIENT)
Dept: ADMINISTRATIVE | Facility: CLINIC | Age: 67
End: 2022-03-08
Payer: MEDICARE

## 2022-03-08 PROBLEM — I48.91 ATRIAL FIBRILLATION: Status: ACTIVE | Noted: 2022-03-08

## 2022-03-08 LAB
BACTERIA BLD CULT: NORMAL
BACTERIA BLD CULT: NORMAL

## 2022-03-08 NOTE — DISCHARGE SUMMARY
[facility] [address]  [facility]  Discharge Summary      Patient Name: Lukasz Person  MRN: [id number]  Patient Class: IP- Inpatient  Admission Date: 3/3/2022  Hospital Length of Stay: 2 days  Discharge Date and Time: 3/6/2022  4:05 PM  Attending Physician: No att. providers found   Discharging Provider: Billy Bingham MD  Primary Care Provider: Kirk Wilson MD  [facility] Team: [facility] HOSP MED Y Billy Bingham MD    HPI:   67 yo M with PMHx DM2, HTN, HLD, KUMAR, and nephrolithiasis s/p recent lithotripsy with stent placement (2/25) who presents to the ED from urgent care for fever x1 day. Pt. Reports that he developed subjective fevers/chills last night. He has had assocaited nausea and malaise. He denies any abdominal pain, vomiting, cough, SOB, or nasal congestion. Pt. Reports that he had been feeling well initally after his lithotripsy with only minor hematuria that has since resolved. At urgent care appointment, pt; was noted to have U/A consistent with UTI as well as a fever of 102.3, so he was referred to the ED for further treatment.      * No surgery found *      Hospital Course:   Mr. Person placed in observation for acute pyelonephritis. Workup in ED consistent with UTI. CT renal study revealed left perinephric fat stranding indicating pyelonephritis. The patient persistently febrile up to 103 F despite initiating IV rocephin and tylenol for fever control. As the patient is s/p stent placement on 2/25, abx coverage was broadened to include gram pos organisms, IV vanc added. Blood cultures collected, NGTD.  Urology consulted and evaluated patient, agreeing with abx treatment and also recommending initiation of diflucan for yeast detected in UA. No urologic intervention necessary. Given current pyelonephritis, the ureteral stent should remain in place. Once patient has recovered from this hospital admission and received an appropriate course of antibiotics we will set up outpatient  cystoscopy for stent removal. CMP revealing minor ABDI with Cr 1.6 (baseline ~1.3). 1L LR IVF given in the ED. Trend BMP. 3/4 rocephin replaced with zosyn as fevers persisted. Patient vitals improved on adjusted abx regimen, fevers controlled. Urine cultures resulted with GNR 3/5, continue zosyn, vanc discontinued. Urine culture revealed Klebsiella Aerogenes 50-99k CFU.  Discharged on Cipofloxacin with probiotic.  This will continue until after his ureteral stent removal which in scheduled for 3/16.  Incidental atrial fibrillation noted during stay, started NOAC - plan as reflected below.     Pyelonephritis  Patient s/p L nephrolithotripsy and ureteral stent placement on 2/25, presenting to the ED after 2 days of fevers, chills, N/V, and left-sided flank pain. UA consistent with UTI.      - Pt. Febrile to 103.1F max, with WBC 10K, HDS.   - Meeting 1/4 SIRS criteria for fever   - 1L LR given in ED   - Blood cultures collected, NGTD  - CT abd shows L stent in place with perinephric stranding around L kidney, c/w pyelonephritis in setting of infectious UA  - Urine culture pending   - IV ceftriaxone started  - Patient with persistent fevers despite CTX and tylenol prn. Adding IV vanc to broaden coverage for GP organisms in setting of recent stent placement.   - Urology consulted, recommending continued abx treatment and addition of diflucan as UA detected yeast.           - Start fluconazole 200mg PO x14 days   - No acute intervention recommended from urology, will schedule follow up for opt cystoscopy and stent removal pending medical stability.   - 3/4 fevers persisting, CTX --> zosyn, continue vanc and diflucan   - 3/5 urine culture growing GNR; d/c vanc. Continue zosyn and diflucan, changed to Cipro for Klebsiella as above.      Atrial Fibrillation      -found during hospital stay      -rate controlled       -started NOAC, Xarelto - will need to hold 48 hr before stent retrieval       -Cardiology f/u    Obesity (BMI  30.0-34.9)  Body mass index is 33.82 kg/m². Morbid obesity complicates all aspects of disease management from diagnostic modalities to treatment. Weight loss encouraged and health benefits explained to patient.        Hyperlipidemia  - continue statin         S/P ureteral stent placement  -Stent in place per CT scan, urology consulted. No intervention recommended        Nephrolithiasis  -S/p L kidney lithotripsy with stent. Non-obstructive punctate stone on left, asymptomatic non-obstructing stone on R.   - Per urology, no intervention at this time         Chronic left shoulder pain  - no acute complaints of pain   - tylenol prn for now         BPH with urinary obstruction  - continue flomax, oxybutynin         Acute renal failure superimposed on stage 3a chronic kidney disease  - Cr 1.6 on admit, baseline ~1.3.   - 1L LR bolus given in ED, trend bmp   - Continue to monitor and avoid nephrotoxic medications  - Treating acute pyelo   - fluid repletion as indicated      Male hypogonadism  - testosterone injections daily at home        Hyperlipidemia associated with type 2 diabetes mellitus  -Continue home fenofibrate        Hypertension associated with diabetes  -Holding home lisinopril-HCTZ as patient at risk for sepsis and appearing volume down, also with mild ABDI   - Given 1L LR in ED, pressures stable overnight. Will begin amlodipine 5mg daily for pressure control while holding home nephrotoxic agents          KUMAR on CPAP  -home CPAP QHS  - patient not utilizing CPAP at night inpatient, prefers supplemental O2 with NC -- ok to continue        Type 2 diabetes mellitus with kidney complication, without long-term current use of insulin  -A1c 7.2 2/2022  - on admit   -Hold home oral meds   -Weight-based insulin regimen while inpatient: Levemir 28U nightly, Novolog 9U TIDWM, low dose SSI  -Diabetic diet          Goals of Care Treatment Preferences:  Code Status: Full Code      Consults:   Consults (From  admission, onward)        Status Ordering Provider     Inpatient consult to Urology  Once        Provider:  (Not yet assigned)    BARTOLOME Braxton          No new Assessment & Plan notes have been filed under this hospital service since the last note was generated.  Service: Hospital Medicine    Final Active Diagnoses:    Diagnosis Date Noted POA    PRINCIPAL PROBLEM:  Pyelonephritis [N12] 03/03/2022 Yes    Hyperlipidemia [E78.5] 03/04/2022 Yes    Obesity (BMI 30.0-34.9) [E66.9] 03/04/2022 Yes    S/P ureteral stent placement [Z96.0] 03/03/2022 Not Applicable    Nephrolithiasis [N20.0] 02/17/2022 Yes    Chronic left shoulder pain [M25.512, G89.29] 11/16/2021 Yes    BPH with urinary obstruction [N40.1, N13.8] 10/20/2021 Yes    Acute renal failure superimposed on stage 3a chronic kidney disease [N17.9, N18.31] 09/24/2020 Yes    Male hypogonadism [E29.1] 09/15/2020 Yes     Chronic    Hyperlipidemia associated with type 2 diabetes mellitus [E11.69, E78.5] 09/14/2020 Yes     Chronic    Hypertension associated with diabetes [E11.59, I15.2] 09/14/2020 Yes     Chronic    KUMAR on CPAP [G47.33, Z99.89] 08/22/2020 Not Applicable     Chronic    Type 2 diabetes mellitus with kidney complication, without long-term current use of insulin [E11.29] 08/22/2020 Yes     Chronic      Problems Resolved During this Admission:       Discharged Condition: stable    Disposition: Home or Self Care    Follow Up:   Follow-up Information     Kirk Wilson MD Follow up.    Specialty: Internal Medicine  Why: The nurse with Dr. Wilson's office will contact you to schedule a hospital follow up visit.  Contact information:  [address] [facility] [address]  [phone]                       Patient Instructions:      Diet Cardiac     Notify your health care provider if you experience any of the following:  temperature >100.4     Notify your health care provider if you experience any of the following:  severe  uncontrolled pain     Notify your health care provider if you experience any of the following:  persistent nausea and vomiting or diarrhea     Cardiac event monitor   Standing Status: Future Standing Exp. Date: 03/06/23     Order Specific Question Answer Comments   Cardiac Event Monitor Auto Trigger    Release to patient Immediate      Activity as tolerated       Significant Diagnostic Studies:     Pending Diagnostic Studies:     Procedure Component Value Units Date/Time    Hepatitis C Antibody [[id number]] Collected: 03/03/22 1851    Order Status: Sent Lab Status: In process Updated: 03/03/22 1901    Specimen: Blood          Medications:  Reconciled Home Medications:      Medication List      START taking these medications    ciprofloxacin HCl 500 MG tablet  Commonly known as: CIPRO  Take 1 tablet (500 mg total) by mouth 2 (two) times a day. Take 1 tablet each morning and evening until follow up with urology for stent removal. for 16 days     CULTURELLE 10 billion cell capsule  Generic drug: Lactobacillus rhamnosus GG  Take 1 capsule by mouth once daily.     XARELTO 20 mg Tab  Generic drug: rivaroxaban  Take 1 tablet (20 mg total) by mouth daily with dinner or evening meal.        CHANGE how you take these medications    fenofibrate 160 MG Tab  TAKE 1 TABLET(160 MG) BY MOUTH EVERY DAY  What changed:   · how much to take  · when to take this     rosuvastatin 20 MG tablet  Commonly known as: CRESTOR  TAKE 1 TABLET(20 MG) BY MOUTH EVERY DAY  What changed:   · how much to take  · when to take this        CONTINUE taking these medications    ACCU-CHEK SOFTCLIX LANCETS Misc  Generic drug: lancets  USE EVERY DAY     allopurinoL 100 MG tablet  Commonly known as: ZYLOPRIM  TAKE 1 TABLET BY MOUTH EVERY DAY     blood sugar diagnostic Strp  Commonly known as: ACCU-CHEK ANTONELLA PLUS TEST STRP  Inject 1 strip into the skin 2 (two) times daily before meals.     blood-glucose meter kit  Checks blood sugars 1x/daily.     glimepiride 2  MG tablet  Commonly known as: AMARYL  TAKE 2 TABLETS BY MOUTH EVERY MORNING     HYDROcodone-acetaminophen 5-325 mg per tablet  Commonly known as: NORCO  Take 1 tablet by mouth every 6 (six) hours as needed for Pain.     * ketorolac 10 mg tablet  Commonly known as: TORADOL  Take 1 tablet (10 mg total) by mouth every 6 (six) hours.     lisinopriL 20 MG tablet  Commonly known as: PRINIVIL,ZESTRIL  Take 1 tablet (20 mg total) by mouth once daily.     lisinopriL-hydrochlorothiazide 20-25 mg Tab  Commonly known as: PRINZIDE,ZESTORETIC  TAKE 1 TABLET BY MOUTH EVERY DAY     metFORMIN 500 MG tablet  Commonly known as: GLUCOPHAGE  TAKE 2 TABLETS BY MOUTH EVERY MORNING AND 2 TABLETS WITH DINNER     nitroGLYCERIN 0.4 MG SL tablet  Commonly known as: NITROSTAT  Place 1 tablet (0.4 mg total) under the tongue every 5 (five) minutes as needed for Chest pain. If you need a third tablet, call 911     oxybutynin 5 MG Tab  Commonly known as: DITROPAN  Take 1 tablet (5 mg total) by mouth 3 (three) times daily as needed (Bladder spasms).     phenazopyridine 200 MG tablet  Commonly known as: PYRIDIUM  Take 1 tablet (200 mg total) by mouth 3 (three) times daily as needed (Burning with urination).     potassium citrate 10 mEq (1,080 mg) Tbsr  Commonly known as: UROCIT-K  TAKE 1 TABLET BY MOUTH TWICE DAILY     * tamsulosin 0.4 mg Cap  Commonly known as: FLOMAX  Take 1 capsule (0.4 mg total) by mouth once daily.     * tamsulosin 0.4 mg Cap  Commonly known as: FLOMAX  Take 1 capsule (0.4 mg total) by mouth once daily.     testosterone 20.25 mg/1.25 gram (1.62 %) Glpm  Commonly known as: AndroGeL  Apply 2 pumps to shoulders daily         * This list has 3 medication(s) that are the same as other medications prescribed for you. Read the directions carefully, and ask your doctor or other care provider to review them with you.            STOP taking these medications    levoFLOXacin 500 MG tablet  Commonly known as: LEVAQUIN        ASK your doctor  about these medications    * ketorolac 10 mg tablet  Commonly known as: TORADOL  Take 1 tablet (10 mg total) by mouth every 6 (six) hours as needed for Pain.  Ask about: Should I take this medication?         * This list has 1 medication(s) that are the same as other medications prescribed for you. Read the directions carefully, and ask your doctor or other care provider to review them with you.                Indwelling Lines/Drains at time of discharge:   Lines/Drains/Airways     None                 Time spent on the discharge of patient: 35 minutes         Billy Bingham MD  Department of Hospital Medicine  [facility] - Telemetry Stepdown ([place]-)

## 2022-03-08 NOTE — PROGRESS NOTES
C3 nurse attempted to contact Lukasz Person for a TCC post hospital discharge follow up call. No answer. Left voicemail with callback information. The patient does not have a scheduled HOSFU appointment. Message sent to PCP staff for assistance with scheduling visit with patient.

## 2022-03-09 ENCOUNTER — PATIENT MESSAGE (OUTPATIENT)
Dept: ADMINISTRATIVE | Facility: CLINIC | Age: 67
End: 2022-03-09
Payer: MEDICARE

## 2022-03-09 NOTE — TELEPHONE ENCOUNTER
vm again left for patient to call clinic to schedule hospital follow up appt. Clinic number left.

## 2022-03-10 LAB — HCV AB SERPL QL IA: NEGATIVE

## 2022-03-11 NOTE — PROGRESS NOTES
C3 nurse spoke with Lukasz Person for a TCC post hospital discharge follow up call. The patient does not have a scheduled HOSFU appointment with Dr. amezcua within 7 days post hospital discharge date 3/6/2022. C3 nurse was unable to schedule HOSFU appointment in [facility].    Message sent to PCP staff requesting they contact patient and schedule follow up appointment.

## 2022-03-11 NOTE — PROGRESS NOTES
C3 nurse attempted to contact Lukasz Person for a TCC post hospital discharge follow up call.  Left  callback information w/ patient. The patient does not have a scheduled HOSFU appointment. Message sent to PCP staff for assistance with scheduling visit with patient.

## 2022-03-16 ENCOUNTER — PROCEDURE VISIT (OUTPATIENT)
Dept: UROLOGY | Facility: CLINIC | Age: 67
End: 2022-03-16
Payer: MEDICARE

## 2022-03-16 VITALS
RESPIRATION RATE: 16 BRPM | WEIGHT: 247 LBS | SYSTOLIC BLOOD PRESSURE: 126 MMHG | BODY MASS INDEX: 33.46 KG/M2 | TEMPERATURE: 98 F | HEART RATE: 118 BPM | DIASTOLIC BLOOD PRESSURE: 72 MMHG | HEIGHT: 72 IN

## 2022-03-16 DIAGNOSIS — N13.5 URETERAL OBSTRUCTION, LEFT: ICD-10-CM

## 2022-03-16 PROBLEM — Z79.01 CHRONIC ANTICOAGULATION: Status: ACTIVE | Noted: 2022-03-16

## 2022-03-16 PROCEDURE — 52310 CYSTOSCOPY AND TREATMENT: CPT | Mod: PBBFAC | Performed by: UROLOGY

## 2022-03-16 PROCEDURE — 52310 PR CYSTOSCOPY,REMV CALCULUS,SIMPLE: ICD-10-PCS | Mod: S$PBB,,, | Performed by: UROLOGY

## 2022-03-16 PROCEDURE — 52310 CYSTOSCOPY AND TREATMENT: CPT | Mod: S$PBB,,, | Performed by: UROLOGY

## 2022-03-16 RX ORDER — LIDOCAINE HYDROCHLORIDE 20 MG/ML
JELLY TOPICAL
Status: COMPLETED | OUTPATIENT
Start: 2022-03-16 | End: 2022-03-16

## 2022-03-16 RX ADMIN — LIDOCAINE HYDROCHLORIDE: 20 JELLY TOPICAL at 02:03

## 2022-03-16 NOTE — PROGRESS NOTES
Mr. Person is a patient of Dr. Nick and was last seen in [facility] Cardiology Visit 6/22/21.      Subjective:   Patient ID:  Lukasz Person is a 66 y.o. male who presents for follow-up of Coronary Artery Disease    Problem List:  Non-obstructive CAD  Diabetes mellitus since his late 50s/early 60  Hypertension since his late 50s/early 60s  Mixed hyperlipidemia  Paroxysmal atrial fibrillation in setting of pyelonephritis  KUMAR - 2006  Lithotripsy  Parathyroidectomy 2017  7 pack year h/o smoking, quit in 1972    HPI:   Lukasz Person (said Mar) is in clinic today for hospital follow up new onset atrial fibrillation.  He developed atrial fibrillation in setting of acute febrile illness, pyelonephritis.  He was started on xarelto 20 mg daily.  He has a CrCl of 71.  Patient has history of obstructive sleep apnea.  Reports nightly use of CPAP machine and denies any associated sx of KUMAR.   Reports feeling very tired.  Denies SOB with exertion.      Review of Systems   Constitutional: Positive for malaise/fatigue. Negative for decreased appetite, diaphoresis, weight gain and weight loss.   Eyes: Negative for visual disturbance.   Cardiovascular: Negative for chest pain, claudication, dyspnea on exertion, irregular heartbeat, leg swelling, near-syncope, orthopnea, palpitations, paroxysmal nocturnal dyspnea and syncope.        Denies chest pressure   Respiratory: Negative for cough, hemoptysis, shortness of breath, sleep disturbances due to breathing and snoring.    Endocrine: Negative for cold intolerance and heat intolerance.   Hematologic/Lymphatic: Negative for bleeding problem. Does not bruise/bleed easily.   Musculoskeletal: Negative for myalgias.   Gastrointestinal: Negative for bloating, abdominal pain, anorexia, change in bowel habit, constipation, diarrhea, nausea and vomiting.   Neurological: Negative for difficulty with concentration, disturbances in coordination, excessive daytime sleepiness, dizziness, headaches,  light-headedness, loss of balance, numbness and weakness.   Psychiatric/Behavioral: The patient does not have insomnia.        Allergies and current medications updated and reviewed:  Review of patient's allergies indicates:  No Known Allergies  Current Outpatient Medications   Medication Sig    ACCU-CHEK SOFTCLIX LANCETS Misc USE EVERY DAY    allopurinoL (ZYLOPRIM) 100 MG tablet TAKE 1 TABLET BY MOUTH EVERY DAY    blood sugar diagnostic (ACCU-CHEK ANTONELLA PLUS TEST STRP) Strp Inject 1 strip into the skin 2 (two) times daily before meals.    blood-glucose meter kit Checks blood sugars 1x/daily.    ciprofloxacin HCl (CIPRO) 500 MG tablet Take 1 tablet (500 mg total) by mouth 2 (two) times a day. Take 1 tablet each morning and evening until follow up with urology for stent removal. for 16 days    fenofibrate 160 MG Tab TAKE 1 TABLET(160 MG) BY MOUTH EVERY DAY (Patient taking differently: Take by mouth every evening.)    glimepiride (AMARYL) 2 MG tablet TAKE 2 TABLETS BY MOUTH EVERY MORNING    Lactobacillus rhamnosus GG (CULTURELLE) 10 billion cell capsule Take 1 capsule by mouth once daily.    lisinopriL (PRINIVIL,ZESTRIL) 20 MG tablet Take 1 tablet (20 mg total) by mouth once daily.    lisinopriL-hydrochlorothiazide (PRINZIDE,ZESTORETIC) 20-25 mg Tab TAKE 1 TABLET BY MOUTH EVERY DAY    metFORMIN (GLUCOPHAGE) 500 MG tablet TAKE 2 TABLETS BY MOUTH EVERY MORNING AND 2 TABLETS WITH DINNER    nitroGLYCERIN (NITROSTAT) 0.4 MG SL tablet Place 1 tablet (0.4 mg total) under the tongue every 5 (five) minutes as needed for Chest pain. If you need a third tablet, call 911    potassium citrate (UROCIT-K) 10 mEq (1,080 mg) TbSR TAKE 1 TABLET BY MOUTH TWICE DAILY    rivaroxaban (XARELTO) 20 mg Tab Take 1 tablet (20 mg total) by mouth daily with dinner or evening meal.    rosuvastatin (CRESTOR) 20 MG tablet TAKE 1 TABLET(20 MG) BY MOUTH EVERY DAY (Patient taking differently: Take by mouth every evening.)    tamsulosin  (FLOMAX) 0.4 mg Cap Take 1 capsule (0.4 mg total) by mouth once daily.    testosterone (ANDROGEL) 20.25 mg/1.25 gram (1.62 %) GlPm Apply 2 pumps to shoulders daily    HYDROcodone-acetaminophen (NORCO) 5-325 mg per tablet Take 1 tablet by mouth every 6 (six) hours as needed for Pain. (Patient not taking: No sig reported)    ketorolac (TORADOL) 10 mg tablet Take 1 tablet (10 mg total) by mouth every 6 (six) hours. (Patient not taking: No sig reported)    oxybutynin (DITROPAN) 5 MG Tab Take 1 tablet (5 mg total) by mouth 3 (three) times daily as needed (Bladder spasms). (Patient not taking: No sig reported)    phenazopyridine (PYRIDIUM) 200 MG tablet Take 1 tablet (200 mg total) by mouth 3 (three) times daily as needed (Burning with urination). (Patient not taking: No sig reported)     No current facility-administered medications for this visit.       Objective:        /76   Pulse 78   Ht 6' (1.829 m)   Wt 113.9 kg (251 lb 1.7 oz)   BMI 34.06 kg/m²       Physical Exam  Vitals and nursing note reviewed.   Constitutional:       General: He is not in acute distress.     Appearance: Normal appearance. He is well-developed. He is not diaphoretic.   HENT:      Head: Normocephalic and atraumatic.   Eyes:      General: Lids are normal. No scleral icterus.     Conjunctiva/sclera: Conjunctivae normal.   Neck:      Vascular: Normal carotid pulses. No carotid bruit, hepatojugular reflux or JVD.   Cardiovascular:      Rate and Rhythm: Normal rate. Rhythm irregularly irregular.      Chest Wall: PMI is not displaced.      Pulses: Intact distal pulses.           Carotid pulses are 2+ on the right side and 2+ on the left side.       Radial pulses are 2+ on the right side and 2+ on the left side.        Dorsalis pedis pulses are 2+ on the right side and 2+ on the left side.        Posterior tibial pulses are 1+ on the right side and 1+ on the left side.      Heart sounds: S1 normal and S2 normal. No murmur heard.    No  friction rub. No gallop.   Pulmonary:      Effort: Pulmonary effort is normal. No respiratory distress.      Breath sounds: Normal breath sounds. No decreased breath sounds, wheezing, rhonchi or rales.   Chest:      Chest wall: No tenderness.   Abdominal:      General: Bowel sounds are normal. There is no distension or abdominal bruit.      Palpations: Abdomen is soft. There is no fluid wave or pulsatile mass.      Tenderness: There is no abdominal tenderness.   Musculoskeletal:      Cervical back: Neck supple.   Skin:     General: Skin is warm and dry.      Findings: No rash.      Nails: There is no clubbing.   Neurological:      Mental Status: He is alert and oriented to person, place, and time.      Gait: Gait normal.   Psychiatric:         Speech: Speech normal.         Behavior: Behavior normal.         Thought Content: Thought content normal.         Judgment: Judgment normal.         Chemistry        Component Value Date/Time     03/06/2022 0849    K 3.8 03/06/2022 0849     03/06/2022 0849    CO2 22 (L) 03/06/2022 0849    BUN 19 03/06/2022 0849    CREATININE 1.3 03/06/2022 0849     03/06/2022 0849        Component Value Date/Time    CALCIUM 9.6 03/06/2022 0849    ALKPHOS 48 (L) 03/05/2022 0847    AST 28 03/05/2022 0847    ALT 19 03/05/2022 0847    BILITOT 0.6 03/05/2022 0847    ESTGFRAFRICA >60.0 03/06/2022 0849    EGFRNONAA 56.5 (A) 03/06/2022 0849        Lab Results   Component Value Date    HGBA1C 7.2 (H) 02/17/2022     Recent Labs   Lab 10/06/21  0906 02/17/22  1232 03/06/22  0849   WBC 5.81   < > 4.99   Hemoglobin 14.1   < > 12.4 L   Hematocrit 44.2   < > 39.4 L   MCV 93   < > 93   Platelets 183   < > 179   TSH 1.569  --   --    Cholesterol 101 L  --   --    HDL 36 L  --   --    LDL Cholesterol 34.2 L  --   --    Triglycerides 154 H  --   --    HDL/Cholesterol Ratio 35.6  --   --     < > = values in this interval not displayed.       Recent Labs   Lab 03/03/22  1851   INR 1.1         Test(s) Reviewed  I have reviewed the following in detail:  [] Stress test   [] Angiography   [x] Echocardiogram   [x] Labs   [] Other:         Assessment/Plan:   1. Paroxysmal atrial fibrillation  ECG today shows afib with a controlled rate of 84 bpm.  Symptomatic.  Will cancel event monitor and get 24 hour holter to evaluate whether he is going in and out of rhythm or persistently in afib.  Refer to EP for consideration of rhythm controlling strategy given that he is fatigued in this rhythm.  Email sent to Dr. Keys in sleep clinic to facilitate f/u to make sure his KUMAR is fully treated.  TTE to evaluate structure and function of his heart.  Add TSH to labs scheduled for 4-6.  Electrolytes wnl on 3/6 and mag wnl on 2/15.    - Echo Saline Bubble? No; Future  - EKG 12-lead  - TSH; Future  -24 hour holter; future    2. Chronic anticoagulation  Denies bleeding.  Reviewed importance of taking xarelto with food. Discussed when to seek immediate medical attention.       3. Hypertension associated with diabetes  BP at goal <130/80. Continue current regimen.      4. Pure hypercholesterolemia  LDL at goal <70. No changes      5. Obesity, diabetes, and hypertension syndrome      6. Hyperlipidemia associated with type 2 diabetes mellitus      7. Obesity (BMI 30.0-34.9)  BMI 34.1 Encouraged CV exercise for 30 minutes a day for 5 days a week.       8. KUMAR on CPAP  Using cpap. Encouraged nightly use of cpap and annual f/u with sleep clinic.  Needs f/u      9. Type 2 diabetes mellitus with diabetic nephropathy, without long-term current use of insulin  Lab Results   Component Value Date    HGBA1C 7.2 (H) 02/17/2022     A1C not at goal <7. F/U with PCP as planned       A copy of this note will be forwarded to Dr. Nick and Dr. Wilson.     Follow up in about 6 months (around 9/17/2022), or if symptoms worsen or fail to improve.

## 2022-03-16 NOTE — PROCEDURES
Procedures   Procedure: Flexible cysto-uretheroscopy and stent removal   Pre Procedure Diagnosis:s/p ureteroscopy and stent placement  Post Procedure Diagnosis:same   Surgeon: Lukasz Hughes MD   Anesthesia: 2% uro-jet lidocaine jelly for local analgesia   Flexible cysto-urethroscopy was performed after consent was obtained. The risks and benefits were explained.   2% lidocaine urojet was used for local analgesia.   The genitalia was prepped and draped in the sterile fashion with betadine.   The flexible scope was advanced into the urethra and into the bladder. The stent was removed without difficulty.   The patient tolerated the procedure well without complication.   They will follow up as scheduled with a renal ultrasound and KUB.

## 2022-03-17 ENCOUNTER — TELEPHONE (OUTPATIENT)
Dept: SLEEP MEDICINE | Facility: CLINIC | Age: 67
End: 2022-03-17
Payer: MEDICARE

## 2022-03-17 ENCOUNTER — PATIENT OUTREACH (OUTPATIENT)
Dept: ADMINISTRATIVE | Facility: OTHER | Age: 67
End: 2022-03-17
Payer: MEDICARE

## 2022-03-17 ENCOUNTER — PATIENT MESSAGE (OUTPATIENT)
Dept: CARDIOLOGY | Facility: CLINIC | Age: 67
End: 2022-03-17

## 2022-03-17 ENCOUNTER — OFFICE VISIT (OUTPATIENT)
Dept: CARDIOLOGY | Facility: CLINIC | Age: 67
End: 2022-03-17
Payer: MEDICARE

## 2022-03-17 VITALS
SYSTOLIC BLOOD PRESSURE: 130 MMHG | WEIGHT: 251.13 LBS | DIASTOLIC BLOOD PRESSURE: 76 MMHG | HEART RATE: 78 BPM | HEIGHT: 72 IN | BODY MASS INDEX: 34.01 KG/M2

## 2022-03-17 DIAGNOSIS — I15.2 OBESITY, DIABETES, AND HYPERTENSION SYNDROME: ICD-10-CM

## 2022-03-17 DIAGNOSIS — E11.21 TYPE 2 DIABETES MELLITUS WITH DIABETIC NEPHROPATHY, WITHOUT LONG-TERM CURRENT USE OF INSULIN: Chronic | ICD-10-CM

## 2022-03-17 DIAGNOSIS — E11.59 OBESITY, DIABETES, AND HYPERTENSION SYNDROME: ICD-10-CM

## 2022-03-17 DIAGNOSIS — E78.5 HYPERLIPIDEMIA ASSOCIATED WITH TYPE 2 DIABETES MELLITUS: Chronic | ICD-10-CM

## 2022-03-17 DIAGNOSIS — G47.33 OSA ON CPAP: Chronic | ICD-10-CM

## 2022-03-17 DIAGNOSIS — E11.69 OBESITY, DIABETES, AND HYPERTENSION SYNDROME: ICD-10-CM

## 2022-03-17 DIAGNOSIS — E11.59 HYPERTENSION ASSOCIATED WITH DIABETES: Chronic | ICD-10-CM

## 2022-03-17 DIAGNOSIS — E11.69 HYPERLIPIDEMIA ASSOCIATED WITH TYPE 2 DIABETES MELLITUS: Chronic | ICD-10-CM

## 2022-03-17 DIAGNOSIS — E66.9 OBESITY, DIABETES, AND HYPERTENSION SYNDROME: ICD-10-CM

## 2022-03-17 DIAGNOSIS — E78.00 PURE HYPERCHOLESTEROLEMIA: ICD-10-CM

## 2022-03-17 DIAGNOSIS — I15.2 HYPERTENSION ASSOCIATED WITH DIABETES: Chronic | ICD-10-CM

## 2022-03-17 DIAGNOSIS — Z79.01 CHRONIC ANTICOAGULATION: ICD-10-CM

## 2022-03-17 DIAGNOSIS — E66.9 OBESITY (BMI 30.0-34.9): ICD-10-CM

## 2022-03-17 DIAGNOSIS — I48.0 PAROXYSMAL ATRIAL FIBRILLATION: Primary | ICD-10-CM

## 2022-03-17 PROCEDURE — 93005 ELECTROCARDIOGRAM TRACING: CPT | Mod: PBBFAC,PO | Performed by: INTERNAL MEDICINE

## 2022-03-17 PROCEDURE — 93010 ELECTROCARDIOGRAM REPORT: CPT | Mod: S$PBB,,, | Performed by: INTERNAL MEDICINE

## 2022-03-17 PROCEDURE — 99999 PR PBB SHADOW E&M-EST. PATIENT-LVL V: ICD-10-PCS | Mod: PBBFAC,,, | Performed by: NURSE PRACTITIONER

## 2022-03-17 PROCEDURE — 99215 OFFICE O/P EST HI 40 MIN: CPT | Mod: PBBFAC,PO | Performed by: NURSE PRACTITIONER

## 2022-03-17 PROCEDURE — 99214 OFFICE O/P EST MOD 30 MIN: CPT | Mod: S$PBB,,, | Performed by: NURSE PRACTITIONER

## 2022-03-17 PROCEDURE — 99999 PR PBB SHADOW E&M-EST. PATIENT-LVL V: CPT | Mod: PBBFAC,,, | Performed by: NURSE PRACTITIONER

## 2022-03-17 PROCEDURE — 99214 PR OFFICE/OUTPT VISIT, EST, LEVL IV, 30-39 MIN: ICD-10-PCS | Mod: S$PBB,,, | Performed by: NURSE PRACTITIONER

## 2022-03-17 PROCEDURE — 93010 EKG 12-LEAD: ICD-10-PCS | Mod: S$PBB,,, | Performed by: INTERNAL MEDICINE

## 2022-03-17 NOTE — PROGRESS NOTES
Care Everywhere: updated  Immunization: updated  Health Maintenance: updated  Media Review: review for outside colon cancer report   Legacy Review:   DIS:  Order placed:   Upcoming appts:  EFAX:  Task Tickets:  Referrals:

## 2022-03-17 NOTE — TELEPHONE ENCOUNTER
----- Message from Idania Johnson NP sent at 3/17/2022  9:41 AM CDT -----  Regarding: follow up  Hi Dr. Keys,     He developed afib recently.  He hasn't seen you since 9/2020 and says his machine is old.  Would you please see him and make sure he's on the right settings and his machine is working?    Thanks!  Idania

## 2022-03-18 ENCOUNTER — HOSPITAL ENCOUNTER (OUTPATIENT)
Dept: CARDIOLOGY | Facility: HOSPITAL | Age: 67
Discharge: HOME | End: 2022-03-18
Attending: NURSE PRACTITIONER
Payer: MEDICARE

## 2022-03-18 ENCOUNTER — TELEPHONE (OUTPATIENT)
Dept: CARDIOLOGY | Facility: CLINIC | Age: 67
End: 2022-03-18

## 2022-03-18 VITALS
HEIGHT: 72 IN | HEART RATE: 110 BPM | DIASTOLIC BLOOD PRESSURE: 70 MMHG | BODY MASS INDEX: 34 KG/M2 | SYSTOLIC BLOOD PRESSURE: 130 MMHG | WEIGHT: 251 LBS

## 2022-03-18 DIAGNOSIS — I48.0 PAROXYSMAL ATRIAL FIBRILLATION: ICD-10-CM

## 2022-03-18 DIAGNOSIS — I48.0 PAROXYSMAL ATRIAL FIBRILLATION: Primary | ICD-10-CM

## 2022-03-18 PROCEDURE — 93306 ECHO (CUPID ONLY): ICD-10-PCS | Mod: 26,,, | Performed by: INTERNAL MEDICINE

## 2022-03-18 PROCEDURE — 93306 TTE W/DOPPLER COMPLETE: CPT | Mod: 26,,, | Performed by: INTERNAL MEDICINE

## 2022-03-18 PROCEDURE — 93306 TTE W/DOPPLER COMPLETE: CPT

## 2022-03-18 RX ORDER — METOPROLOL SUCCINATE 50 MG/1
50 TABLET, EXTENDED RELEASE ORAL DAILY
Qty: 30 TABLET | Refills: 11 | Status: ON HOLD | OUTPATIENT
Start: 2022-03-18 | End: 2022-04-07 | Stop reason: SDUPTHER

## 2022-03-18 NOTE — TELEPHONE ENCOUNTER
New onset atrial fib.  During his Cardiology clinic visit yesterday ventricular rate was well controlled, but during his echo today he had heart rates of 120-140 bpm while laying down. Will begin metoprolol 50 mg

## 2022-03-20 LAB
ASCENDING AORTA: 3.29 CM
AV INDEX (PROSTH): 0.58
AV MEAN GRADIENT: 9 MMHG
AV PEAK GRADIENT: 16 MMHG
AV VALVE AREA: 2.39 CM2
AV VELOCITY RATIO: 0.5
BSA FOR ECHO PROCEDURE: 2.4 M2
CV ECHO LV RWT: 0.43 CM
DOP CALC AO PEAK VEL: 1.97 M/S
DOP CALC AO VTI: 29.79 CM
DOP CALC LVOT AREA: 4.2 CM2
DOP CALC LVOT DIAMETER: 2.3 CM
DOP CALC LVOT PEAK VEL: 0.98 M/S
DOP CALC LVOT STROKE VOLUME: 71.34 CM3
DOP CALCLVOT PEAK VEL VTI: 17.18 CM
E WAVE DECELERATION TIME: 193.11 MSEC
E/E' RATIO: 7.41 M/S
ECHO LV POSTERIOR WALL: 1.05 CM (ref 0.6–1.1)
EJECTION FRACTION: 45 %
FRACTIONAL SHORTENING: 44 % (ref 28–44)
INTERVENTRICULAR SEPTUM: 0.9 CM (ref 0.6–1.1)
IVRT: 85.63 MSEC
LA MAJOR: 5.33 CM
LA MINOR: 5.63 CM
LA WIDTH: 3.96 CM
LEFT ATRIUM SIZE: 3.92 CM
LEFT ATRIUM VOLUME INDEX MOD: 23.2 ML/M2
LEFT ATRIUM VOLUME INDEX: 30.7 ML/M2
LEFT ATRIUM VOLUME MOD: 54.49 CM3
LEFT ATRIUM VOLUME: 72.25 CM3
LEFT INTERNAL DIMENSION IN SYSTOLE: 2.76 CM (ref 2.1–4)
LEFT VENTRICLE DIASTOLIC VOLUME INDEX: 47.74 ML/M2
LEFT VENTRICLE DIASTOLIC VOLUME: 112.18 ML
LEFT VENTRICLE MASS INDEX: 72 G/M2
LEFT VENTRICLE SYSTOLIC VOLUME INDEX: 12.1 ML/M2
LEFT VENTRICLE SYSTOLIC VOLUME: 28.54 ML
LEFT VENTRICULAR INTERNAL DIMENSION IN DIASTOLE: 4.89 CM (ref 3.5–6)
LEFT VENTRICULAR MASS: 169.57 G
LV LATERAL E/E' RATIO: 6.67 M/S
LV SEPTAL E/E' RATIO: 8.33 M/S
MV PEAK E VEL: 1 M/S
MV STENOSIS PRESSURE HALF TIME: 56 MS
MV VALVE AREA P 1/2 METHOD: 3.93 CM2
PISA TR MAX VEL: 2.25 M/S
PULM VEIN S/D RATIO: 0.68
PV PEAK D VEL: 0.47 M/S
PV PEAK S VEL: 0.32 M/S
RA MAJOR: 5.64 CM
RA PRESSURE: 3 MMHG
RA WIDTH: 2.82 CM
RIGHT VENTRICULAR END-DIASTOLIC DIMENSION: 3.01 CM
SINUS: 3.25 CM
STJ: 2.91 CM
TDI LATERAL: 0.15 M/S
TDI SEPTAL: 0.12 M/S
TDI: 0.14 M/S
TR MAX PG: 20 MMHG
TRICUSPID ANNULAR PLANE SYSTOLIC EXCURSION: 2.26 CM
TV REST PULMONARY ARTERY PRESSURE: 23 MMHG

## 2022-03-21 ENCOUNTER — TELEPHONE (OUTPATIENT)
Dept: SLEEP MEDICINE | Facility: CLINIC | Age: 67
End: 2022-03-21
Payer: MEDICARE

## 2022-03-21 ENCOUNTER — TELEPHONE (OUTPATIENT)
Dept: CARDIOLOGY | Facility: CLINIC | Age: 67
End: 2022-03-21
Payer: MEDICARE

## 2022-03-21 NOTE — TELEPHONE ENCOUNTER
Called Mr. Person and he was not sure if he should be taking the probiotics or the tamsulosin any longer.  Instructed him to d/w urology.  Removed medications from his list that he's no longer taking per his request.

## 2022-03-22 ENCOUNTER — HOSPITAL ENCOUNTER (OUTPATIENT)
Dept: CARDIOLOGY | Facility: HOSPITAL | Age: 67
Discharge: HOME | End: 2022-03-22
Attending: NURSE PRACTITIONER
Payer: MEDICARE

## 2022-03-22 DIAGNOSIS — I42.9 CARDIOMYOPATHY, UNSPECIFIED TYPE: Primary | ICD-10-CM

## 2022-03-22 DIAGNOSIS — I48.0 PAROXYSMAL ATRIAL FIBRILLATION: ICD-10-CM

## 2022-03-22 PROCEDURE — 93225 XTRNL ECG REC<48 HRS REC: CPT

## 2022-03-22 PROCEDURE — 93227 XTRNL ECG REC<48 HR R&I: CPT | Mod: ,,, | Performed by: STUDENT IN AN ORGANIZED HEALTH CARE EDUCATION/TRAINING PROGRAM

## 2022-03-22 PROCEDURE — 93227 HOLTER MONITOR - 24 HOUR (CUPID ONLY): ICD-10-PCS | Mod: ,,, | Performed by: STUDENT IN AN ORGANIZED HEALTH CARE EDUCATION/TRAINING PROGRAM

## 2022-03-22 NOTE — PROGRESS NOTES
D/w Dr. Nick and will plan to have him get repeat TTE in 8 weeks once he is back in SR to evaluate LV systolic function.  If EF remains reduced at that time, would plan to do ischemic evaluation.

## 2022-03-23 ENCOUNTER — TELEPHONE (OUTPATIENT)
Dept: CARDIOLOGY | Facility: CLINIC | Age: 67
End: 2022-03-23
Payer: MEDICARE

## 2022-03-23 NOTE — TELEPHONE ENCOUNTER
Pt reminded of appointment w/Dr. Lin and also stated he is not having any side effects from Metroprolol.

## 2022-03-23 NOTE — TELEPHONE ENCOUNTER
----- Message from Idania Johnson NP sent at 3/22/2022  5:27 PM CDT -----  Emanuel Bravo,   Can you please have him get a TTE and f/u in 8 weeks?  We would like to evaluate his squeeze function of his heart after he's back in rhythm.   Thanks!  Idania

## 2022-03-24 ENCOUNTER — HOSPITAL ENCOUNTER (OUTPATIENT)
Dept: CARDIOLOGY | Facility: CLINIC | Age: 67
Discharge: HOME | End: 2022-03-24
Payer: MEDICARE

## 2022-03-24 ENCOUNTER — PATIENT MESSAGE (OUTPATIENT)
Dept: UROLOGY | Facility: CLINIC | Age: 67
End: 2022-03-24
Payer: MEDICARE

## 2022-03-24 ENCOUNTER — TELEPHONE (OUTPATIENT)
Dept: UROLOGY | Facility: CLINIC | Age: 67
End: 2022-03-24
Payer: MEDICARE

## 2022-03-24 ENCOUNTER — OFFICE VISIT (OUTPATIENT)
Dept: ELECTROPHYSIOLOGY | Facility: CLINIC | Age: 67
End: 2022-03-24
Payer: MEDICARE

## 2022-03-24 VITALS
BODY MASS INDEX: 34.49 KG/M2 | SYSTOLIC BLOOD PRESSURE: 110 MMHG | WEIGHT: 254.63 LBS | HEIGHT: 72 IN | DIASTOLIC BLOOD PRESSURE: 60 MMHG | HEART RATE: 100 BPM

## 2022-03-24 DIAGNOSIS — I50.20 HFREF (HEART FAILURE WITH REDUCED EJECTION FRACTION): ICD-10-CM

## 2022-03-24 DIAGNOSIS — G47.33 OSA ON CPAP: ICD-10-CM

## 2022-03-24 DIAGNOSIS — Z98.890 HISTORY OF LITHOTRIPSY: ICD-10-CM

## 2022-03-24 DIAGNOSIS — I10 PRIMARY HYPERTENSION: ICD-10-CM

## 2022-03-24 DIAGNOSIS — N18.30 TYPE 2 DIABETES MELLITUS WITH STAGE 3 CHRONIC KIDNEY DISEASE, WITHOUT LONG-TERM CURRENT USE OF INSULIN, UNSPECIFIED WHETHER STAGE 3A OR 3B CKD: ICD-10-CM

## 2022-03-24 DIAGNOSIS — E78.2 MIXED HYPERLIPIDEMIA: ICD-10-CM

## 2022-03-24 DIAGNOSIS — I48.91 ATRIAL FIBRILLATION, UNSPECIFIED TYPE: ICD-10-CM

## 2022-03-24 DIAGNOSIS — E66.9 CLASS 1 OBESITY WITH BODY MASS INDEX (BMI) OF 34.0 TO 34.9 IN ADULT, UNSPECIFIED OBESITY TYPE, UNSPECIFIED WHETHER SERIOUS COMORBIDITY PRESENT: ICD-10-CM

## 2022-03-24 DIAGNOSIS — I25.10 CORONARY ARTERY DISEASE INVOLVING NATIVE CORONARY ARTERY OF NATIVE HEART WITHOUT ANGINA PECTORIS: ICD-10-CM

## 2022-03-24 DIAGNOSIS — Z98.890 HISTORY OF PARATHYROIDECTOMY: ICD-10-CM

## 2022-03-24 DIAGNOSIS — Z90.89 HISTORY OF PARATHYROIDECTOMY: ICD-10-CM

## 2022-03-24 DIAGNOSIS — Z87.448 HISTORY OF PYELONEPHRITIS: ICD-10-CM

## 2022-03-24 DIAGNOSIS — E11.22 TYPE 2 DIABETES MELLITUS WITH STAGE 3 CHRONIC KIDNEY DISEASE, WITHOUT LONG-TERM CURRENT USE OF INSULIN, UNSPECIFIED WHETHER STAGE 3A OR 3B CKD: ICD-10-CM

## 2022-03-24 DIAGNOSIS — I48.91 ATRIAL FIBRILLATION, UNSPECIFIED TYPE: Primary | ICD-10-CM

## 2022-03-24 DIAGNOSIS — I48.0 PAROXYSMAL ATRIAL FIBRILLATION: Primary | ICD-10-CM

## 2022-03-24 DIAGNOSIS — N18.30 STAGE 3 CHRONIC KIDNEY DISEASE, UNSPECIFIED WHETHER STAGE 3A OR 3B CKD: ICD-10-CM

## 2022-03-24 DIAGNOSIS — Z87.891 HISTORY OF TOBACCO USE: ICD-10-CM

## 2022-03-24 DIAGNOSIS — E21.0 HYPERPARATHYROIDISM, PRIMARY: ICD-10-CM

## 2022-03-24 DIAGNOSIS — I35.8 AORTIC VALVE SCLEROSIS: ICD-10-CM

## 2022-03-24 LAB
OHS CV EVENT MONITOR DAY: 0
OHS CV HOLTER LENGTH DECIMAL HOURS: 24
OHS CV HOLTER LENGTH HOURS: 24
OHS CV HOLTER LENGTH MINUTES: 0

## 2022-03-24 PROCEDURE — 99214 PR OFFICE/OUTPT VISIT, EST, LEVL IV, 30-39 MIN: ICD-10-PCS | Mod: S$PBB,,, | Performed by: STUDENT IN AN ORGANIZED HEALTH CARE EDUCATION/TRAINING PROGRAM

## 2022-03-24 PROCEDURE — 93010 RHYTHM STRIP: ICD-10-PCS | Mod: S$PBB,,, | Performed by: INTERNAL MEDICINE

## 2022-03-24 PROCEDURE — 99214 OFFICE O/P EST MOD 30 MIN: CPT | Mod: S$PBB,,, | Performed by: STUDENT IN AN ORGANIZED HEALTH CARE EDUCATION/TRAINING PROGRAM

## 2022-03-24 PROCEDURE — 93010 ELECTROCARDIOGRAM REPORT: CPT | Mod: S$PBB,,, | Performed by: INTERNAL MEDICINE

## 2022-03-24 PROCEDURE — 93005 ELECTROCARDIOGRAM TRACING: CPT | Mod: PBBFAC | Performed by: INTERNAL MEDICINE

## 2022-03-24 PROCEDURE — 99214 OFFICE O/P EST MOD 30 MIN: CPT | Mod: PBBFAC | Performed by: STUDENT IN AN ORGANIZED HEALTH CARE EDUCATION/TRAINING PROGRAM

## 2022-03-24 PROCEDURE — 99999 PR PBB SHADOW E&M-EST. PATIENT-LVL IV: CPT | Mod: PBBFAC,,, | Performed by: STUDENT IN AN ORGANIZED HEALTH CARE EDUCATION/TRAINING PROGRAM

## 2022-03-24 PROCEDURE — 99999 PR PBB SHADOW E&M-EST. PATIENT-LVL IV: ICD-10-PCS | Mod: PBBFAC,,, | Performed by: STUDENT IN AN ORGANIZED HEALTH CARE EDUCATION/TRAINING PROGRAM

## 2022-03-24 NOTE — TELEPHONE ENCOUNTER
[id number] spoke to pt Lukasz and let him know that I sent his message to Dr. Hughes about stopping his 2 medications.  Also noticed that his post op follow up should be in 3 months, not 2 months as currently scheduled.  Will move to May 23, 2022.  Pt WENDIE.

## 2022-03-24 NOTE — PROGRESS NOTES
Electrophysiology Clinic Note    Reason for new patient visit: Evaluation and recommendations regarding paroxysmal atrial fibrillation.     PRESENTING HISTORY:     History of Present Illness:  Mr. Lukasz Person is a randa 67-year-old gentleman who presents today for evaluation and recommendations regarding paroxysmal atrial fibrillation. He has a past medical history significant for a new diagnosis of paroxysmal atrial fibrillation, mildly-reduced systolic function with LVEF 45%, non-obstructive coronary artery disease, hypertension, hyperlipidemia, type II diabetes mellitus, CKD stage III, aortic sclerosis without stenosis, obesity, obstructive sleep apnea, hyperparathyroidism s/p parathyroidectomy, a history of pyelonephritis with lithotripsy, and a history of tobacco use.    He is followed in general cardiology clinic with Dr. Nick and GLENDA Johnson. He developed atrial fibrillation in setting of an acute episode of pyelonephritis, requiring admission from 3/3-6/2022. At that admission, he was noted to have fever and chills, with a pelvic CT renal study revealing left perinephric fat stranding indicating pyelonephritis. He remained persistently febrile up to 103 F despite initiating IV rocephin and Tylenol for antipyretic therapy. Urology advised broadened antibiotic coverage, in addition to antifungal coverage, with recommendations to leave his recently placed uretal stent in place until after his acute event resolved. He was discharged home with ciprofloxacin and close urology follow-up. During this admission, he was noted to have periods of atrial fibrillation with RVR, with a newly discovered decline in systolic function, with LVEF 45%. He was initiated on rate control with metoprolol succinate 50mg po daily in addition to rivaroxaban 20mg po daily. He denies any adverse bleeding events while on this agent. He reports nightly use of his CPAP machine. On serial ECG review, he has remained in atrial  "fibrillation, at times with RVR, since his prior admission. While in atrial fibrillation, he reports symptoms of palpitations, generalized fatigue, worsened shortness of breath, and exercise intolerance. He is able to perform his chores, but has not been able to mow his lawn or walk with his family in the evenings as he previously did.      In discussion with Mr. Person today, he tells me that he is feeling overall well, despite his persistent symptoms while in atrial fibrillation. He denies any episodes of dizziness, lightheadedness, syncope/presyncope, chest pain or chest discomfort, nausea or vomiting, orthopnea, lower extremity edema, or PND. He reports brief, intermittent palpitations as above, in addition to increased fatigue and exercise intolerance. He reports baseline shortness of breath and dyspnea with exertion that he feels are made worse with his atrial fibrillation. He can climb 1 flight of stairs prior to needing to take a break, but feels that he has "slowed down" since his recent atrial fibrillation episode.    Review of Systems:  Review of Systems   Constitutional: Positive for activity change and fatigue. Negative for chills and fever.        Exercise intolerance while in atrial fibrillation.    HENT: Negative for nasal congestion, nosebleeds, postnasal drip, rhinorrhea, sinus pressure/congestion, sneezing and sore throat.    Respiratory: Positive for shortness of breath. Negative for apnea, cough, chest tightness and wheezing.    Cardiovascular: Positive for palpitations. Negative for chest pain, leg swelling and claudication.   Gastrointestinal: Negative for abdominal distention, abdominal pain, blood in stool, change in bowel habit, constipation, diarrhea, nausea, vomiting and change in bowel habit.   Genitourinary: Negative for dysuria and hematuria.   Musculoskeletal: Negative for gait problem.   Neurological: Negative for dizziness, seizures, syncope, weakness, light-headedness, headaches, " disturbances in coordination and coordination difficulties.        PAST HISTORY:     Past Medical History:   Diagnosis Date    Diabetes mellitus     Onset late 50s/early 60s    Hyperlipidemia     Hypertension     Onset late 50s/early 60s    Sleep apnea     since 2006       Past Surgical History:   Procedure Laterality Date    CORONARY ANGIOGRAPHY N/A 9/30/2020    Procedure: ANGIOGRAM, CORONARY ARTERY;  Surgeon: John West MD;  Location: [facility] CATH LAB;  Service: Cardiology;  Laterality: N/A;    CYSTOSCOPY N/A 2/17/2022    Procedure: CYSTOSCOPY;  Surgeon: Lukasz Hughes MD;  Location: [address];  Service: Urology;  Laterality: N/A;    CYSTOSCOPY W/ URETERAL STENT PLACEMENT N/A 2/25/2022    Procedure: CYSTOSCOPY, WITH URETERAL STENT INSERTION;  Surgeon: Luksaz Hughes MD;  Location: [address];  Service: Urology;  Laterality: N/A;    LASER LITHOTRIPSY Left 2/25/2022    Procedure: LITHOTRIPSY, USING LASER;  Surgeon: Lukasz Hughes MD;  Location: [address];  Service: Urology;  Laterality: Left;    LEFT HEART CATHETERIZATION Left 9/30/2020    Procedure: Left heart cath;  Surgeon: John West MD;  Location: [facility] CATH LAB;  Service: Cardiology;  Laterality: Left;    LITHOTRIPSY      PARATHYROIDECTOMY  1/1/2-107    PYELOSCOPY Left 2/25/2022    Procedure: PYELOSCOPY;  Surgeon: Lukasz Hughes MD;  Location: [address];  Service: Urology;  Laterality: Left;    URETEROSCOPIC REMOVAL OF URETERIC CALCULUS Left 2/25/2022    Procedure: REMOVAL, CALCULUS, URETER, URETEROSCOPIC;  Surgeon: Lukasz Hughes MD;  Location: [address];  Service: Urology;  Laterality: Left;    URETEROSCOPY Left 2/25/2022    Procedure: URETEROSCOPY;  Surgeon: Lukasz Hughes MD;  Location: [address];  Service: Urology;  Laterality: Left;       Family History:  Family History   Problem Relation Age of Onset    Heart disease Father 70        CABG    Arthritis Father      Diabetes Father     Kidney disease Father         had one kidney removed in early thirties    Arthritis Mother     Colon cancer Brother 32    Diabetes Paternal Grandmother     Colon polyps Paternal Grandfather     Colon cancer Paternal Grandfather     Cancer Paternal Grandfather         colon cancer at age 62    Alcohol abuse Paternal Aunt     Arthritis Sister     Arthritis Sister     Arthritis Brother     Cancer Brother         colon cancer at age 32    Cancer Maternal Grandfather         throat cancer    Hypertension Sister        Social History:  He  reports that he quit smoking about 26 years ago. His smoking use included cigarettes and cigars. He started smoking about 49 years ago. He has a 7.00 pack-year smoking history. He has never used smokeless tobacco. He reports current alcohol use of about 3.0 standard drinks of alcohol per week. He reports previous drug use. Drug: Marijuana.      MEDICATIONS & ALLERGIES:     Review of patient's allergies indicates:  No Known Allergies    Current Outpatient Medications on File Prior to Visit   Medication Sig Dispense Refill    ACCU-CHEK SOFTCLIX LANCETS Misc USE EVERY  each 6    allopurinoL (ZYLOPRIM) 100 MG tablet TAKE 1 TABLET BY MOUTH EVERY DAY 30 tablet 10    blood sugar diagnostic (ACCU-CHEK ANTONELLA PLUS TEST STRP) Strp Inject 1 strip into the skin 2 (two) times daily before meals. 100 strip 11    blood-glucose meter kit Checks blood sugars 1x/daily. 1 each 12    fenofibrate 160 MG Tab TAKE 1 TABLET(160 MG) BY MOUTH EVERY DAY (Patient taking differently: Take by mouth every evening.) 30 tablet 10    glimepiride (AMARYL) 2 MG tablet TAKE 2 TABLETS BY MOUTH EVERY MORNING 60 tablet 9    Lactobacillus rhamnosus GG (CULTURELLE) 10 billion cell capsule Take 1 capsule by mouth once daily. 30 capsule 0    lisinopriL-hydrochlorothiazide (PRINZIDE,ZESTORETIC) 20-25 mg Tab TAKE 1 TABLET BY MOUTH EVERY DAY 90 tablet 1    metFORMIN (GLUCOPHAGE) 500  MG tablet TAKE 2 TABLETS BY MOUTH EVERY MORNING AND 2 TABLETS WITH DINNER 120 tablet 10    metoprolol succinate (TOPROL-XL) 50 MG 24 hr tablet Take 1 tablet (50 mg total) by mouth once daily. 30 tablet 11    nitroGLYCERIN (NITROSTAT) 0.4 MG SL tablet Place 1 tablet (0.4 mg total) under the tongue every 5 (five) minutes as needed for Chest pain. If you need a third tablet, call 911 60 tablet 12    potassium citrate (UROCIT-K) 10 mEq (1,080 mg) TbSR TAKE 1 TABLET BY MOUTH TWICE DAILY 60 tablet 9    rivaroxaban (XARELTO) 20 mg Tab Take 1 tablet (20 mg total) by mouth daily with dinner or evening meal. 30 tablet 11    rosuvastatin (CRESTOR) 20 MG tablet TAKE 1 TABLET(20 MG) BY MOUTH EVERY DAY (Patient taking differently: Take by mouth every evening.) 30 tablet 11    tamsulosin (FLOMAX) 0.4 mg Cap Take 1 capsule (0.4 mg total) by mouth once daily. 30 capsule 2    testosterone (ANDROGEL) 20.25 mg/1.25 gram (1.62 %) GlPm Apply 2 pumps to shoulders daily 1 each 5     No current facility-administered medications on file prior to visit.        OBJECTIVE:     Vital Signs:  /60   Pulse 100   Ht 6' (1.829 m)   Wt 115.5 kg (254 lb 10.1 oz)   BMI 34.53 kg/m²     Physical Exam:  Physical Exam  Constitutional:       General: He is not in acute distress.     Appearance: Normal appearance. He is obese. He is not ill-appearing or diaphoretic.      Comments: Well-appearing elderly man in NAD.   HENT:      Head: Normocephalic and atraumatic.      Comments: Mask worn in clinic in the setting of COVID precautions.   Eyes:      Pupils: Pupils are equal, round, and reactive to light.   Cardiovascular:      Rate and Rhythm: Tachycardia present. Rhythm irregular.      Pulses: Normal pulses.      Heart sounds: Murmur heard.     No friction rub. No gallop.      Comments: Irregularly irregular and tachycardia per right radial artery palpation. II/VI ESVIN best appreciated at the RUSB.   Pulmonary:      Effort: Pulmonary effort is  normal. No respiratory distress.      Breath sounds: Normal breath sounds. No wheezing, rhonchi or rales.   Chest:      Chest wall: No tenderness.   Abdominal:      General: There is no distension.      Palpations: Abdomen is soft.      Tenderness: There is no abdominal tenderness.   Musculoskeletal:         General: No swelling or tenderness.      Cervical back: Normal range of motion.      Right lower leg: No edema.      Left lower leg: No edema.   Skin:     General: Skin is warm and dry.      Findings: No erythema, lesion or rash.   Neurological:      General: No focal deficit present.      Mental Status: He is alert and oriented to person, place, and time. Mental status is at baseline.      Motor: No weakness.      Gait: Gait normal.   Psychiatric:         Mood and Affect: Mood normal.         Behavior: Behavior normal.        Laboratory Data:  Lab Results   Component Value Date    WBC 5.78 03/22/2022    HGB 13.2 (L) 03/22/2022    HCT 41.6 03/22/2022    MCV 94 03/22/2022     03/22/2022     Lab Results   Component Value Date     03/06/2022     03/06/2022    K 3.8 03/06/2022     03/06/2022    CO2 22 (L) 03/06/2022    BUN 19 03/06/2022    CREATININE 1.3 03/06/2022    CALCIUM 9.6 03/06/2022    MG 2.0 02/15/2022     Lab Results   Component Value Date    INR 1.1 03/03/2022       Pertinent Cardiac Data:  ECG: Atrial fibrillation with rapid ventricular response, ventricular rate of 100 bpm, QRS 90 ms, QT/QTc 350/451 ms.     Pharmacologic Nuclear Cardiac Stress Test - PET - 9/14/2020:    The relative PET images are normal showing no clinically significant regional resting or stress induced perfusion defects.    Whole heart absolute myocardial perfusion (cc/min/g) averaged 0.59 cc/min/g at rest, which is reduced, 1.73 cc/min/g at stress, which is mildly reduced, and 2.94  CFR, which is normal.    There is mild thinning of the mid and distal inferior wall with preserved flow capacity.  These  findings are consistent with non-obstructive RCA disease or possible RCA  with excellent collaterals.    Gated perfusion images showed an ejection fraction of 57% at rest and 79% during stress. Normal ejection fraction is greater than 51%.    Wall motion was normal at rest and during stress.    LV cavity size is normal at rest and stress.    The EKG portion of this study is negative for ischemia.    Arrhythmias during stress: occasional PVCs.    The patient reported no chest pain during the stress test.    There are no prior studies for comparison.    Coronary Angiogram - 9/30/2020:  · LVEDP (Pre): 10  · 50% PDA stenosis iFR=0.98  · Estimated blood loss: <50 mL    Resting 2D Transthoracic Echocardiogram - 3/18/2022:  · Heart rate varied b/w 94 and 132 bpm.  · The left ventricle is normal in size with concentric remodeling and mildly decreased systolic function.  · The estimated ejection fraction is 45%.  · A diastolic pattern consistent with atrial fibrillation observed.  · Normal right ventricular size with normal right ventricular systolic function.  · Aortic sclerosis w/o stenosis. Aortic valve area is 2.39 cm2; peak velocity is 1.97 m/s; mean gradient is 9 mmHg.  · The estimated PA systolic pressure is 23 mmHg.  · Normal central venous pressure (3 mmHg).      ASSESSMENT & PLAN:   Mr. Lukasz Person is a randa 67-year-old gentleman who presents today for evaluation and recommendations regarding paroxysmal atrial fibrillation. He has a past medical history significant for a new diagnosis of paroxysmal atrial fibrillation, mildly-reduced systolic function with LVEF 45%, non-obstructive coronary artery disease, hypertension, hyperlipidemia, type II diabetes mellitus, CKD stage III, aortic sclerosis without stenosis, obesity, obstructive sleep apnea, hyperparathyroidism s/p parathyroidectomy, a history of pyelonephritis with lithotripsy, and a history of tobacco use.    - We discussed the pathophysiology of  atrial fibrillation; specifically, we discussed paroxysmal atrial fibrillation and the concept that some patients may experience paroxysms of atrial fibrillation interrupting periods of sinus rhythm. We discussed that even a relatively low burden of atrial fibrillation continues to have an increased risk of CVA. Lastly, we discussed that in some cases atrial fibrillation may be triggered secondary to an underlying acute illness or infection.  - We discussed an attempt at rhythm restoration by undergoing a cardioversion procedure. Restoration of sinus rhythm may alleviate his mild symptoms of exercise intolerance, generalized fatigue, shortness of breath, and palpitations. This procedure was discussed in detail, including potential risks, benefits, and alternative options, and the patient would be amenable to cardioversion. He remains on uninterrupted rivaroxaban therapy, but given his reduced systolic function, he will require a KARSON prior to DCCV in order to evaluate for intracardiac thrombus. Informed consent was obtained in clinic today.  - He has never had a trial of antiarrhythmic therapy. While he has no history of underlying obstructive coronary artery disease, he has mildly reduced systolic function based on review of his recent echocardiogram, with most recent LVEF 45%. He additionally recently experienced an episode of pyelonephritis with CKD stage III. Given his mildly reduced systolic function and history of CKD, a class III agent would be preferred. We will plan to start amiodarone therapy at the time of his cardioversion.   - He is presently maintained on rate control with metoprolol succinate 50mg po daily - we will plan to continue this agent for improved rate control following his cardioversion.   - His [id number] is 4 (CHF, HTN, T2DM, male gender, age 65-74), portending an annual adjusted risk of CVA of 4%. We discussed the relative increased risk of CVA around the time of cardioversion and the  need to stay on oral anticoagulation preceding and following DCCV. He remains on uninterrupted rivaroxaban 20mg po daily with dinner.    - We discussed the possibility of catheter ablation with pulmonary vein isolation should he continue to have symptoms and AF despite AAD therapy. He voices understanding, although he would like to attempt cardioversion and rhythm control at this time.   - Possible underlying drivers of atrial fibrillation were addressed at this appointment, including recommendations for weight loss - now a class I recommendation. Review of laboratory data reveals acceptable TSH of 1.677. We discussed that his prior pyelonephritis may have been driving his atrial fibrillation, but that many patients may have atrial fibrillation independently and he may need to continue medications for management of his AF. We discussed the role that obstructive sleep apnea may play in atrial fibrillation, with recommendations for continued use of his CPAP machine.      This patient will present to the EP laboratory to undergo KARSON/DCCV at my next available appointment with initiation of amiodarone and continuation of rivaroxaban therapy. All questions and concerns were addressed at this encounter.     Signing Physician:       PHIL Lin MD  Electrophysiology Attending

## 2022-03-28 ENCOUNTER — PATIENT MESSAGE (OUTPATIENT)
Dept: ELECTROPHYSIOLOGY | Facility: CLINIC | Age: 67
End: 2022-03-28
Payer: MEDICARE

## 2022-03-29 ENCOUNTER — TELEPHONE (OUTPATIENT)
Dept: URGENT CARE | Facility: CLINIC | Age: 67
End: 2022-03-29
Payer: MEDICARE

## 2022-03-29 NOTE — TELEPHONE ENCOUNTER
Pt was left a messaged to follow up but no response.     ----- Message from Eduar Ca NP sent at 3/7/2022  4:12 PM CST -----  Okay to call pt with result: urine culture positive, complete antibiotic (cipro) given by ER and follow up with pcp if still having symptoms

## 2022-03-30 ENCOUNTER — PATIENT MESSAGE (OUTPATIENT)
Dept: UROLOGY | Facility: CLINIC | Age: 67
End: 2022-03-30
Payer: MEDICARE

## 2022-03-30 ENCOUNTER — PATIENT MESSAGE (OUTPATIENT)
Dept: ELECTROPHYSIOLOGY | Facility: CLINIC | Age: 67
End: 2022-03-30
Payer: MEDICARE

## 2022-03-30 ENCOUNTER — TELEPHONE (OUTPATIENT)
Dept: ELECTROPHYSIOLOGY | Facility: CLINIC | Age: 67
End: 2022-03-30
Payer: MEDICARE

## 2022-03-30 DIAGNOSIS — I48.0 PAROXYSMAL ATRIAL FIBRILLATION: Primary | ICD-10-CM

## 2022-03-30 NOTE — TELEPHONE ENCOUNTER
Called patient and spoke with him about dates available for his Cardioversion.   He will discuss with his wife and let me know what works for him.   Call back number provided.

## 2022-03-31 ENCOUNTER — PATIENT MESSAGE (OUTPATIENT)
Dept: ELECTROPHYSIOLOGY | Facility: CLINIC | Age: 67
End: 2022-03-31
Payer: MEDICARE

## 2022-03-31 ENCOUNTER — TELEPHONE (OUTPATIENT)
Dept: ELECTROPHYSIOLOGY | Facility: CLINIC | Age: 67
End: 2022-03-31
Payer: MEDICARE

## 2022-03-31 NOTE — TELEPHONE ENCOUNTER
Called patient to help with questions.   Explained to patient about his appointment change time due to busy scheduling with other doctors sometimes the slots get filled quickly and we have to make adjustments.   He was satisfied with this answer.     Instructed on how to get to the check in desk.  I called the cardiology Financial counselor and left message with patients name and MRN #.   Will try again tomorrow to contact this dept. I let the patient know that if there is any copay due he would get a call prior to the procedure.     Reminded patient of his lab appt tomorrow.    Patient had no further questions and appreciated the call.

## 2022-04-01 ENCOUNTER — LAB VISIT (OUTPATIENT)
Dept: LAB | Facility: HOSPITAL | Age: 67
End: 2022-04-01
Attending: STUDENT IN AN ORGANIZED HEALTH CARE EDUCATION/TRAINING PROGRAM
Payer: MEDICARE

## 2022-04-01 DIAGNOSIS — I48.0 PAROXYSMAL ATRIAL FIBRILLATION: ICD-10-CM

## 2022-04-01 LAB
ANION GAP SERPL CALC-SCNC: 10 MMOL/L (ref 8–16)
APTT BLDCRRT: 31.4 SEC (ref 21–32)
BUN SERPL-MCNC: 14 MG/DL (ref 8–23)
CALCIUM SERPL-MCNC: 9.4 MG/DL (ref 8.7–10.5)
CHLORIDE SERPL-SCNC: 100 MMOL/L (ref 95–110)
CO2 SERPL-SCNC: 26 MMOL/L (ref 23–29)
CREAT SERPL-MCNC: 1.5 MG/DL (ref 0.5–1.4)
EST. GFR  (AFRICAN AMERICAN): 54.5 ML/MIN/1.73 M^2
EST. GFR  (NON AFRICAN AMERICAN): 47.2 ML/MIN/1.73 M^2
GLUCOSE SERPL-MCNC: 347 MG/DL (ref 70–110)
INR PPP: 1.1 (ref 0.8–1.2)
POTASSIUM SERPL-SCNC: 4.2 MMOL/L (ref 3.5–5.1)
PROTHROMBIN TIME: 11.4 SEC (ref 9–12.5)
SODIUM SERPL-SCNC: 136 MMOL/L (ref 136–145)

## 2022-04-01 PROCEDURE — 36415 COLL VENOUS BLD VENIPUNCTURE: CPT | Performed by: STUDENT IN AN ORGANIZED HEALTH CARE EDUCATION/TRAINING PROGRAM

## 2022-04-01 PROCEDURE — 80048 BASIC METABOLIC PNL TOTAL CA: CPT | Performed by: STUDENT IN AN ORGANIZED HEALTH CARE EDUCATION/TRAINING PROGRAM

## 2022-04-01 PROCEDURE — 85730 THROMBOPLASTIN TIME PARTIAL: CPT | Performed by: STUDENT IN AN ORGANIZED HEALTH CARE EDUCATION/TRAINING PROGRAM

## 2022-04-01 PROCEDURE — 85610 PROTHROMBIN TIME: CPT | Performed by: STUDENT IN AN ORGANIZED HEALTH CARE EDUCATION/TRAINING PROGRAM

## 2022-04-06 ENCOUNTER — TELEPHONE (OUTPATIENT)
Dept: RESEARCH | Facility: HOSPITAL | Age: 67
End: 2022-04-06
Payer: MEDICARE

## 2022-04-06 ENCOUNTER — TELEPHONE (OUTPATIENT)
Dept: ELECTROPHYSIOLOGY | Facility: CLINIC | Age: 67
End: 2022-04-06
Payer: MEDICARE

## 2022-04-06 ENCOUNTER — PATIENT MESSAGE (OUTPATIENT)
Dept: MEDSURG UNIT | Facility: HOSPITAL | Age: 67
End: 2022-04-06
Payer: MEDICARE

## 2022-04-06 NOTE — HPI
Lukasz Person presents to short stay cardiac unit on 04/07/2022 for planned KARSON guided DCCV with Dr. Lin.     Mr. Person is a 67 y.o. male with newly diagnosed pAF, mildly-reduced systolic function with EF 45%, non-obstructive CAD, HTN, HLD, T2DM, CKD IIII, obesity, KUMAR, hyperparathyroidism s/p parathyroidectomy, history of pyelonephritis with lithotripsy, and a history of tobacco use.    He is followed in general cardiology clinic with Dr. Nick and GLENDA Johnson. He developed atrial fibrillation in setting of an acute episode of pyelonephritis, requiring admission from 3/3-6/2022. During this admission, he was noted to have periods of atrial fibrillation with RVR, with a newly discovered decline in systolic function, with LVEF 45%. He was initiated on rate control with metoprolol succinate 50mg po daily in addition to rivaroxaban 20mg po daily. He denies any adverse bleeding events while on this agent. He reports nightly use of his CPAP machine. On serial ECG review, he has remained in atrial fibrillation, at times with RVR, since his prior admission. While in atrial fibrillation, he reports symptoms of palpitations, generalized fatigue, worsening shortness of breath, and exercise intolerance.     Mr. Person was referred to EP and seen by Dr. Lin in clinic. Discused AF as well as different treatment options. Given mild symptoms and reduced EF, would endorse rhythm control. Mr. Person elected to proceed.     EKG:  My independent visualization of most recent EKG is AF at 105 bpm

## 2022-04-06 NOTE — TELEPHONE ENCOUNTER
Study title: Efficacy and Safety of Dual Direct Current Cardioversion Versus Single Direct Current  Cardioversion as an Initial Treatment Strategy in Obese Patients   IRB #: [id number]  IRB approval date: 9/15/2020  Sponsor: [facility]    Called patient to introduce DCCV study. No answer. Voicemail left with contact info.

## 2022-04-06 NOTE — TELEPHONE ENCOUNTER
Spoke to Lukasz Person    CONFIRMED procedure arrival time of 11:00am tomorrow morning 4/7/22    Reiterated instructions including:  -Directions to check in desk  -NPO after midnight night prior to procedure  -High importance of HOLDING Glimepiride and Metformin in the morning only   -Confirmed compliance of Xeralto, takes every evening with meal.    -Pre-procedure LABS Completed and reviewed, no alerts notes  -COVID test not required for vaccinated patients  --Reviewed current visitor policy    Patient verbalizes understanding of above and appreciates call.

## 2022-04-06 NOTE — ASSESSMENT & PLAN NOTE
s/p KARSON guided DCCV with restoration of sinus rhythm    Plan:  - Continue PTA medications  - AAD: start amiodarone with 2 week loading dose  - Follow up with ECG in 1 week (4/14/22)  - Follow up with Gill Brunson NP ~ 4 weeks  - Discharge plans/instructions discussed with patient who verbalized understanding and agreement of plans of care. No further questions or concerns  voiced at this time.

## 2022-04-07 ENCOUNTER — PATIENT MESSAGE (OUTPATIENT)
Dept: CARDIOLOGY | Facility: CLINIC | Age: 67
End: 2022-04-07
Payer: MEDICARE

## 2022-04-07 ENCOUNTER — ANESTHESIA EVENT (OUTPATIENT)
Dept: MEDSURG UNIT | Facility: HOSPITAL | Age: 67
End: 2022-04-07
Payer: MEDICARE

## 2022-04-07 ENCOUNTER — HOSPITAL ENCOUNTER (OUTPATIENT)
Dept: CARDIOLOGY | Facility: HOSPITAL | Age: 67
Discharge: HOME | End: 2022-04-07
Attending: STUDENT IN AN ORGANIZED HEALTH CARE EDUCATION/TRAINING PROGRAM
Payer: MEDICARE

## 2022-04-07 ENCOUNTER — HOSPITAL ENCOUNTER (OUTPATIENT)
Facility: HOSPITAL | Age: 67
Discharge: HOME | End: 2022-04-07
Attending: STUDENT IN AN ORGANIZED HEALTH CARE EDUCATION/TRAINING PROGRAM | Admitting: STUDENT IN AN ORGANIZED HEALTH CARE EDUCATION/TRAINING PROGRAM
Payer: MEDICARE

## 2022-04-07 ENCOUNTER — RESEARCH ENCOUNTER (OUTPATIENT)
Dept: RESEARCH | Facility: HOSPITAL | Age: 67
End: 2022-04-07
Payer: MEDICARE

## 2022-04-07 ENCOUNTER — TELEPHONE (OUTPATIENT)
Dept: CARDIOLOGY | Facility: CLINIC | Age: 67
End: 2022-04-07
Payer: MEDICARE

## 2022-04-07 ENCOUNTER — ANESTHESIA (OUTPATIENT)
Dept: MEDSURG UNIT | Facility: HOSPITAL | Age: 67
End: 2022-04-07
Payer: MEDICARE

## 2022-04-07 VITALS
BODY MASS INDEX: 34.27 KG/M2 | HEIGHT: 72 IN | WEIGHT: 253 LBS | DIASTOLIC BLOOD PRESSURE: 67 MMHG | SYSTOLIC BLOOD PRESSURE: 131 MMHG

## 2022-04-07 VITALS
WEIGHT: 253.5 LBS | RESPIRATION RATE: 23 BRPM | DIASTOLIC BLOOD PRESSURE: 76 MMHG | SYSTOLIC BLOOD PRESSURE: 124 MMHG | TEMPERATURE: 97 F | HEART RATE: 58 BPM | HEIGHT: 72 IN | BODY MASS INDEX: 34.34 KG/M2 | OXYGEN SATURATION: 100 %

## 2022-04-07 DIAGNOSIS — I48.91 ATRIAL FIBRILLATION: Primary | ICD-10-CM

## 2022-04-07 DIAGNOSIS — I48.0 PAROXYSMAL ATRIAL FIBRILLATION: ICD-10-CM

## 2022-04-07 DIAGNOSIS — I48.91 ATRIAL FIBRILLATION: ICD-10-CM

## 2022-04-07 LAB
ASCENDING AORTA: 3 CM
BSA FOR ECHO PROCEDURE: 2.41 M2
EJECTION FRACTION: 40 %
POCT GLUCOSE: 104 MG/DL (ref 70–110)
POCT GLUCOSE: 112 MG/DL (ref 70–110)
SINUS: 3 CM
STJ: 2.7 CM

## 2022-04-07 PROCEDURE — 93312 TRANSESOPHAGEAL ECHO (TEE) (CUPID ONLY): ICD-10-PCS | Mod: 26,,, | Performed by: INTERNAL MEDICINE

## 2022-04-07 PROCEDURE — 93320 DOPPLER ECHO COMPLETE: CPT | Mod: 26,,, | Performed by: INTERNAL MEDICINE

## 2022-04-07 PROCEDURE — 93320 TRANSESOPHAGEAL ECHO (TEE) (CUPID ONLY): ICD-10-PCS | Mod: 26,,, | Performed by: INTERNAL MEDICINE

## 2022-04-07 PROCEDURE — 25000003 PHARM REV CODE 250: Performed by: NURSE ANESTHETIST, CERTIFIED REGISTERED

## 2022-04-07 PROCEDURE — 25000003 PHARM REV CODE 250: Performed by: NURSE PRACTITIONER

## 2022-04-07 PROCEDURE — 93325 DOPPLER ECHO COLOR FLOW MAPG: CPT | Mod: 26,,, | Performed by: INTERNAL MEDICINE

## 2022-04-07 PROCEDURE — 92960 CARDIOVERSION ELECTRIC EXT: CPT | Performed by: NURSE PRACTITIONER

## 2022-04-07 PROCEDURE — 92960 PR CARDIOVERSION, ELECTIVE;EXTERN: ICD-10-PCS | Mod: ,,, | Performed by: NURSE PRACTITIONER

## 2022-04-07 PROCEDURE — 93005 ELECTROCARDIOGRAM TRACING: CPT | Mod: 59

## 2022-04-07 PROCEDURE — 92960 CARDIOVERSION ELECTRIC EXT: CPT | Mod: ,,, | Performed by: NURSE PRACTITIONER

## 2022-04-07 PROCEDURE — D9220A PRA ANESTHESIA: Mod: CRNA,,, | Performed by: NURSE ANESTHETIST, CERTIFIED REGISTERED

## 2022-04-07 PROCEDURE — 93325 TRANSESOPHAGEAL ECHO (TEE) (CUPID ONLY): ICD-10-PCS | Mod: 26,,, | Performed by: INTERNAL MEDICINE

## 2022-04-07 PROCEDURE — 63600175 PHARM REV CODE 636 W HCPCS: Performed by: NURSE ANESTHETIST, CERTIFIED REGISTERED

## 2022-04-07 PROCEDURE — D9220A PRA ANESTHESIA: ICD-10-PCS | Mod: CRNA,,, | Performed by: NURSE ANESTHETIST, CERTIFIED REGISTERED

## 2022-04-07 PROCEDURE — 93312 ECHO TRANSESOPHAGEAL: CPT | Mod: 26,,, | Performed by: INTERNAL MEDICINE

## 2022-04-07 PROCEDURE — D9220A PRA ANESTHESIA: ICD-10-PCS | Mod: ANES,,, | Performed by: ANESTHESIOLOGY

## 2022-04-07 PROCEDURE — 93010 EKG 12-LEAD: ICD-10-PCS | Mod: ,,, | Performed by: INTERNAL MEDICINE

## 2022-04-07 PROCEDURE — 93010 ELECTROCARDIOGRAM REPORT: CPT | Mod: ,,, | Performed by: INTERNAL MEDICINE

## 2022-04-07 PROCEDURE — 37000009 HC ANESTHESIA EA ADD 15 MINS: Performed by: NURSE PRACTITIONER

## 2022-04-07 PROCEDURE — 93325 DOPPLER ECHO COLOR FLOW MAPG: CPT

## 2022-04-07 PROCEDURE — 37000008 HC ANESTHESIA 1ST 15 MINUTES: Performed by: NURSE PRACTITIONER

## 2022-04-07 PROCEDURE — D9220A PRA ANESTHESIA: Mod: ANES,,, | Performed by: ANESTHESIOLOGY

## 2022-04-07 RX ORDER — LIDOCAINE HYDROCHLORIDE 20 MG/ML
INJECTION, SOLUTION EPIDURAL; INFILTRATION; INTRACAUDAL; PERINEURAL
Status: DISCONTINUED | OUTPATIENT
Start: 2022-04-07 | End: 2022-04-07

## 2022-04-07 RX ORDER — FENTANYL CITRATE 50 UG/ML
25 INJECTION, SOLUTION INTRAMUSCULAR; INTRAVENOUS EVERY 5 MIN PRN
Status: CANCELLED | OUTPATIENT
Start: 2022-04-07

## 2022-04-07 RX ORDER — MIDAZOLAM HYDROCHLORIDE 1 MG/ML
INJECTION, SOLUTION INTRAMUSCULAR; INTRAVENOUS
Status: DISCONTINUED | OUTPATIENT
Start: 2022-04-07 | End: 2022-04-07

## 2022-04-07 RX ORDER — METOPROLOL SUCCINATE 25 MG/1
25 TABLET, EXTENDED RELEASE ORAL DAILY
Qty: 30 TABLET | Refills: 11 | Status: SHIPPED | OUTPATIENT
Start: 2022-04-07 | End: 2023-07-20

## 2022-04-07 RX ORDER — KETAMINE HCL IN 0.9 % NACL 50 MG/5 ML
SYRINGE (ML) INTRAVENOUS
Status: DISCONTINUED | OUTPATIENT
Start: 2022-04-07 | End: 2022-04-07

## 2022-04-07 RX ORDER — SILVER SULFADIAZINE 10 G/1000G
CREAM TOPICAL
Status: DISCONTINUED | OUTPATIENT
Start: 2022-04-07 | End: 2022-04-07 | Stop reason: HOSPADM

## 2022-04-07 RX ORDER — ONDANSETRON 2 MG/ML
4 INJECTION INTRAMUSCULAR; INTRAVENOUS DAILY PRN
Status: CANCELLED | OUTPATIENT
Start: 2022-04-07

## 2022-04-07 RX ORDER — PROPOFOL 10 MG/ML
VIAL (ML) INTRAVENOUS CONTINUOUS PRN
Status: DISCONTINUED | OUTPATIENT
Start: 2022-04-07 | End: 2022-04-07

## 2022-04-07 RX ORDER — PROPOFOL 10 MG/ML
VIAL (ML) INTRAVENOUS
Status: DISCONTINUED | OUTPATIENT
Start: 2022-04-07 | End: 2022-04-07

## 2022-04-07 RX ORDER — AMIODARONE HYDROCHLORIDE 200 MG/1
TABLET ORAL
Qty: 146 TABLET | Refills: 0 | Status: SHIPPED | OUTPATIENT
Start: 2022-04-07 | End: 2022-06-02

## 2022-04-07 RX ADMIN — Medication 50 MG: at 01:04

## 2022-04-07 RX ADMIN — PROPOFOL 150 MCG/KG/MIN: 10 INJECTION, EMULSION INTRAVENOUS at 01:04

## 2022-04-07 RX ADMIN — SODIUM CHLORIDE: 0.9 INJECTION, SOLUTION INTRAVENOUS at 01:04

## 2022-04-07 RX ADMIN — MIDAZOLAM 2 MG: 1 INJECTION INTRAMUSCULAR; INTRAVENOUS at 01:04

## 2022-04-07 RX ADMIN — GLYCOPYRROLATE 0.1 MG: 0.2 INJECTION INTRAMUSCULAR; INTRAVENOUS at 01:04

## 2022-04-07 RX ADMIN — SODIUM CHLORIDE 1000 ML: 0.9 INJECTION, SOLUTION INTRAVENOUS at 12:04

## 2022-04-07 RX ADMIN — LIDOCAINE HYDROCHLORIDE 100 MG: 20 INJECTION, SOLUTION EPIDURAL; INFILTRATION; INTRACAUDAL; PERINEURAL at 01:04

## 2022-04-07 RX ADMIN — Medication 20 MG: at 01:04

## 2022-04-07 NOTE — SUBJECTIVE & OBJECTIVE
Past Medical History:   Diagnosis Date    Anticoagulant long-term use     Diabetes mellitus     Onset late 50s/early 60s    Hyperlipidemia     Hypertension     Onset late 50s/early 60s    Kidney stones     Sleep apnea     since 2006       Past Surgical History:   Procedure Laterality Date    CORONARY ANGIOGRAPHY N/A 9/30/2020    Procedure: ANGIOGRAM, CORONARY ARTERY;  Surgeon: John West MD;  Location: [facility] CATH LAB;  Service: Cardiology;  Laterality: N/A;    CYSTOSCOPY N/A 2/17/2022    Procedure: CYSTOSCOPY;  Surgeon: Lukasz Hughes MD;  Location: [address];  Service: Urology;  Laterality: N/A;    CYSTOSCOPY W/ URETERAL STENT PLACEMENT N/A 2/25/2022    Procedure: CYSTOSCOPY, WITH URETERAL STENT INSERTION;  Surgeon: Lukasz Hughes MD;  Location: [address];  Service: Urology;  Laterality: N/A;    LASER LITHOTRIPSY Left 2/25/2022    Procedure: LITHOTRIPSY, USING LASER;  Surgeon: Lukasz Hughes MD;  Location: [address];  Service: Urology;  Laterality: Left;    LEFT HEART CATHETERIZATION Left 9/30/2020    Procedure: Left heart cath;  Surgeon: John West MD;  Location: [facility] CATH LAB;  Service: Cardiology;  Laterality: Left;    LITHOTRIPSY      PARATHYROIDECTOMY  1/1/2-107    PYELOSCOPY Left 2/25/2022    Procedure: PYELOSCOPY;  Surgeon: Lukasz Hughes MD;  Location: [address];  Service: Urology;  Laterality: Left;    URETEROSCOPIC REMOVAL OF URETERIC CALCULUS Left 2/25/2022    Procedure: REMOVAL, CALCULUS, URETER, URETEROSCOPIC;  Surgeon: Lukasz Hughes MD;  Location: [address];  Service: Urology;  Laterality: Left;    URETEROSCOPY Left 2/25/2022    Procedure: URETEROSCOPY;  Surgeon: Lukasz Hughes MD;  Location: [address];  Service: Urology;  Laterality: Left;       Review of patient's allergies indicates:  No Known Allergies    No current facility-administered medications on file prior to encounter.     Current Outpatient Medications on File  Prior to Encounter   Medication Sig    allopurinoL (ZYLOPRIM) 100 MG tablet TAKE 1 TABLET BY MOUTH EVERY DAY    fenofibrate 160 MG Tab TAKE 1 TABLET(160 MG) BY MOUTH EVERY DAY (Patient taking differently: Take by mouth every evening.)    glimepiride (AMARYL) 2 MG tablet TAKE 2 TABLETS BY MOUTH EVERY MORNING    lisinopriL-hydrochlorothiazide (PRINZIDE,ZESTORETIC) 20-25 mg Tab TAKE 1 TABLET BY MOUTH EVERY DAY    metFORMIN (GLUCOPHAGE) 500 MG tablet TAKE 2 TABLETS BY MOUTH EVERY MORNING AND 2 TABLETS WITH DINNER    metoprolol succinate (TOPROL-XL) 50 MG 24 hr tablet Take 1 tablet (50 mg total) by mouth once daily.    potassium citrate (UROCIT-K) 10 mEq (1,080 mg) TbSR TAKE 1 TABLET BY MOUTH TWICE DAILY    rivaroxaban (XARELTO) 20 mg Tab Take 1 tablet (20 mg total) by mouth daily with dinner or evening meal.    rosuvastatin (CRESTOR) 20 MG tablet TAKE 1 TABLET(20 MG) BY MOUTH EVERY DAY (Patient taking differently: Take by mouth every evening.)    tamsulosin (FLOMAX) 0.4 mg Cap Take 1 capsule (0.4 mg total) by mouth once daily.    ACCU-CHEK SOFTCLIX LANCETS Misc USE EVERY DAY    blood sugar diagnostic (ACCU-CHEK ANTONELLA PLUS TEST STRP) Strp Inject 1 strip into the skin 2 (two) times daily before meals.    blood-glucose meter kit Checks blood sugars 1x/daily.    [] Lactobacillus rhamnosus GG (CULTURELLE) 10 billion cell capsule Take 1 capsule by mouth once daily.    nitroGLYCERIN (NITROSTAT) 0.4 MG SL tablet Place 1 tablet (0.4 mg total) under the tongue every 5 (five) minutes as needed for Chest pain. If you need a third tablet, call 911    testosterone (ANDROGEL) 20.25 mg/1.25 gram (1.62 %) GlPm Apply 2 pumps to shoulders daily     Family History       Problem Relation (Age of Onset)    Alcohol abuse Paternal Aunt    Arthritis Father, Mother, Sister, Sister, Brother    Cancer Paternal Grandfather, Brother, Maternal Grandfather    Colon cancer Brother (32), Paternal Grandfather    Colon polyps Paternal Grandfather     Diabetes Father, Paternal Grandmother    Heart disease Father (70)    Hypertension Sister    Kidney disease Father          Tobacco Use    Smoking status: Former Smoker     Packs/day: 1.00     Years: 7.00     Pack years: 7.00     Types: Cigarettes, Cigars     Start date: 1972     Quit date:      Years since quittin.9    Smokeless tobacco: Never Used    Tobacco comment: smoking was off and on.  cumulative 7 years.  never more than three years in one stretch or more lj   Substance and Sexual Activity    Alcohol use: Yes     Alcohol/week: 3.0 standard drinks     Types: 2 Cans of beer, 1 Shots of liquor per week     Comment: don't drink regularly.  when I do, three beers or shots    Drug use: Not Currently     Types: Marijuana    Sexual activity: Not Currently     Partners: Female     Birth control/protection: None     Comment:      Review of Systems   Constitutional: Negative for chills, decreased appetite and fever.   HENT:  Negative for congestion and sore throat.    Eyes:  Negative for blurred vision and discharge.   Cardiovascular:  Negative for chest pain, claudication, cyanosis, dyspnea on exertion and leg swelling.   Respiratory:  Negative for cough, hemoptysis and shortness of breath.    Endocrine: Negative for cold intolerance and heat intolerance.   Skin:  Negative for color change.   Musculoskeletal:  Negative for muscle weakness and myalgias.   Gastrointestinal:  Negative for bloating and abdominal pain.   Neurological:  Negative for dizziness, focal weakness and weakness.   Psychiatric/Behavioral:  Negative for altered mental status and depression.    Objective:     Vital Signs (Most Recent):  Temp: 97.7 °F (36.5 °C) (22 1205)  Pulse: 82 (22 1205)  Resp: 18 (22 1205)  BP: 120/65 (22 1207)  SpO2: 96 % (22 1205) Vital Signs (24h Range):  Temp:  [97.7 °F (36.5 °C)] 97.7 °F (36.5 °C)  Pulse:  [82] 82  Resp:  [18] 18  SpO2:  [96 %] 96 %  BP:  (118-120)/(65-66) 120/65     Weight: 115 kg (253 lb 8.5 oz)  Body mass index is 34.38 kg/m².    SpO2: 96 %  O2 Device (Oxygen Therapy): room air    No intake or output data in the 24 hours ending 04/07/22 1253    Lines/Drains/Airways       Peripheral Intravenous Line  Duration                  Peripheral IV - Single Lumen 03/05/22 1400 22 G Left Forearm 32 days         Peripheral IV - Single Lumen 04/07/22 1223 20 G Left Wrist <1 day                    Physical Exam  Constitutional:       Appearance: He is well-developed.   HENT:      Head: Normocephalic and atraumatic.      Right Ear: External ear normal.      Left Ear: External ear normal.   Eyes:      Conjunctiva/sclera: Conjunctivae normal.   Cardiovascular:      Rate and Rhythm: Normal rate. Rhythm irregular.      Pulses: Intact distal pulses.           Radial pulses are 2+ on the right side and 2+ on the left side.      Heart sounds: Normal heart sounds.   Pulmonary:      Effort: Pulmonary effort is normal. No respiratory distress.      Breath sounds: Normal breath sounds. No wheezing.   Abdominal:      General: Bowel sounds are normal. There is no distension.      Palpations: Abdomen is soft.      Tenderness: There is no abdominal tenderness.   Musculoskeletal:         General: Normal range of motion.      Cervical back: Normal range of motion and neck supple.   Skin:     General: Skin is warm and dry.   Neurological:      Mental Status: He is alert and oriented to person, place, and time.

## 2022-04-07 NOTE — HPI
Mr. Lukasz Person is a 67-year-old male who presents for elective KARSON/DCCV. He is followed by Dr. Nick and Dr. Lin.     Dysphagia or odynophagia:  No  Liver Disease, esophageal disease, or known varices:  No  Upper GI Bleeding: No  Snoring:  No  Sleep Apnea:  No  Prior neck surgery or radiation:  No  History of anesthetic difficulties:  No  Family history of anesthetic difficulties:  No  Last oral intake:  12 hours ago  Able to move neck in all directions:  Yes    TTE 3/18/22  Heart rate varied b/w 94 and 132 bpm.  The left ventricle is normal in size with concentric remodeling and mildly decreased systolic function.  The estimated ejection fraction is 45%.  A diastolic pattern consistent with atrial fibrillation observed.  Normal right ventricular size with normal right ventricular systolic function.  Aortic sclerosis w/o stenosis. Aortic valve area is 2.39 cm2; peak velocity is 1.97 m/s; mean gradient is 9 mmHg.  The estimated PA systolic pressure is 23 mmHg.  Normal central venous pressure (3 mmHg).      Following history obtained from most recent ep clinic encounter.    He has a past medical history significant for a new diagnosis of paroxysmal atrial fibrillation, mildly-reduced systolic function with LVEF 45%, non-obstructive coronary artery disease, hypertension, hyperlipidemia, type II diabetes mellitus, CKD stage III, aortic sclerosis without stenosis, obesity, obstructive sleep apnea, hyperparathyroidism s/p parathyroidectomy, a history of pyelonephritis with lithotripsy, and a history of tobacco use.     He is followed in general cardiology clinic with Dr. Nick and GLENDA Johnson. He developed atrial fibrillation in setting of an acute episode of pyelonephritis, requiring admission from 3/3-6/2022. At that admission, he was noted to have fever and chills, with a pelvic CT renal study revealing left perinephric fat stranding indicating pyelonephritis. He remained persistently febrile up to 103 F  "despite initiating IV rocephin and Tylenol for antipyretic therapy. Urology advised broadened antibiotic coverage, in addition to antifungal coverage, with recommendations to leave his recently placed uretal stent in place until after his acute event resolved. He was discharged home with ciprofloxacin and close urology follow-up. During this admission, he was noted to have periods of atrial fibrillation with RVR, with a newly discovered decline in systolic function, with LVEF 45%. He was initiated on rate control with metoprolol succinate 50mg po daily in addition to rivaroxaban 20mg po daily. He denies any adverse bleeding events while on this agent. He reports nightly use of his CPAP machine. On serial ECG review, he has remained in atrial fibrillation, at times with RVR, since his prior admission. While in atrial fibrillation, he reports symptoms of palpitations, generalized fatigue, worsened shortness of breath, and exercise intolerance. He is able to perform his chores, but has not been able to mow his lawn or walk with his family in the evenings as he previously did.       In discussion with Mr. Person today, he tells me that he is feeling overall well, despite his persistent symptoms while in atrial fibrillation. He denies any episodes of dizziness, lightheadedness, syncope/presyncope, chest pain or chest discomfort, nausea or vomiting, orthopnea, lower extremity edema, or PND. He reports brief, intermittent palpitations as above, in addition to increased fatigue and exercise intolerance. He reports baseline shortness of breath and dyspnea with exertion that he feels are made worse with his atrial fibrillation. He can climb 1 flight of stairs prior to needing to take a break, but feels that he has "slowed down" since his recent atrial fibrillation episode.  "

## 2022-04-07 NOTE — ASSESSMENT & PLAN NOTE
66 yo M presenting for elective artie/dccv.    1. ARTIE for evaluation of LA thrombus  -No absolute contraindications of esophageal stricture, tumor, perforation, laceration,or diverticulum and/or active GI bleed  -The risks, benefits & alternatives of the procedure were explained to the patient.   -The risks of transesophageal echo include but are not limited to:  Dental trauma, esophageal trauma/perforation, bleeding, laryngospasm/brochospasm, aspiration, sore throat/hoarseness, & dislodgement of the endotracheal tube/nasogastric tube (where applicable).    -The risks of moderate sedation include hypotension, respiratory depression, arrhythmias, bronchospasm, & death.    -Informed consent was obtained. The patient is agreeable to proceed with the procedure and all questions and concerns addressed.    Case discussed with an attending in echocardiography lab.     Further recommendations per attending addendum

## 2022-04-07 NOTE — HOSPITAL COURSE
Mr. Person underwent KARSON without evidence of CAREY thrombus.  Proceeded with DCCV 200 J x 1 shock, converting from AF to sinus rhythm. He tolerated the procedure without any acute complications.  Post-DCCV ECG revealing of sinus rhythm. Instructed to continue PTA medications and start amiodarone with 2 week loading dose. Return in 1 week for ECG and ~ 4 weeks for clinic follow up.   Mr. Person was assessed at bedside prior to discharge. He reported feeling well and denied chest discomfort, shortness of breath, palpitations, lightheadedness, or any other acute symptoms. Discharge instructions were discussed and all questions were answered. Mr. Person was discharged home in stable condition.

## 2022-04-07 NOTE — NURSING
Received via stretcher from procedure.  Report from CRNA.  Oral airway in place.  VSS.  Will continue to moniti

## 2022-04-07 NOTE — TELEPHONE ENCOUNTER
Spoke to patient, scheduled appointment for next week at [place] location. Patient verbalized understanding.

## 2022-04-07 NOTE — H&P
[facility]  Cardiology  History and Physical     Patient Name: Lukasz Person  MRN: [id number]  Admission Date: 4/7/2022  Code Status: Prior   Attending Provider: CORINA Lin MD   Primary Care Physician: Kirk Wilson MD  Principal Problem:<principal problem not specified>    Patient information was obtained from patient and ER records.     Subjective:     Chief Complaint:  Elective artie/dccv       HPI:  Mr. Lukasz Person is a 67-year-old male who presents for elective ARTIE/DCCV. He is followed by Dr. Nick and Dr. Lin.     ECG today shows afib    Dysphagia or odynophagia:  No  Liver Disease, esophageal disease, or known varices:  No  Upper GI Bleeding: No  Snoring:  No  Sleep Apnea:  No  Prior neck surgery or radiation:  No  History of anesthetic difficulties:  No  Family history of anesthetic difficulties:  No  Last oral intake:  12 hours ago  Able to move neck in all directions:  Yes    TTE 3/18/22  · Heart rate varied b/w 94 and 132 bpm.  · The left ventricle is normal in size with concentric remodeling and mildly decreased systolic function.  · The estimated ejection fraction is 45%.  · A diastolic pattern consistent with atrial fibrillation observed.  · Normal right ventricular size with normal right ventricular systolic function.  · Aortic sclerosis w/o stenosis. Aortic valve area is 2.39 cm2; peak velocity is 1.97 m/s; mean gradient is 9 mmHg.  · The estimated PA systolic pressure is 23 mmHg.  · Normal central venous pressure (3 mmHg).      Following history obtained from most recent ep clinic encounter.    He has a past medical history significant for a new diagnosis of paroxysmal atrial fibrillation, mildly-reduced systolic function with LVEF 45%, non-obstructive coronary artery disease, hypertension, hyperlipidemia, type II diabetes mellitus, CKD stage III, aortic sclerosis without stenosis, obesity, obstructive sleep apnea, hyperparathyroidism s/p parathyroidectomy, a history  of pyelonephritis with lithotripsy, and a history of tobacco use.     He is followed in general cardiology clinic with Dr. Nick and GLENDA Johnson. He developed atrial fibrillation in setting of an acute episode of pyelonephritis, requiring admission from 3/3-6/2022. At that admission, he was noted to have fever and chills, with a pelvic CT renal study revealing left perinephric fat stranding indicating pyelonephritis. He remained persistently febrile up to 103 F despite initiating IV rocephin and Tylenol for antipyretic therapy. Urology advised broadened antibiotic coverage, in addition to antifungal coverage, with recommendations to leave his recently placed uretal stent in place until after his acute event resolved. He was discharged home with ciprofloxacin and close urology follow-up. During this admission, he was noted to have periods of atrial fibrillation with RVR, with a newly discovered decline in systolic function, with LVEF 45%. He was initiated on rate control with metoprolol succinate 50mg po daily in addition to rivaroxaban 20mg po daily. He denies any adverse bleeding events while on this agent. He reports nightly use of his CPAP machine. On serial ECG review, he has remained in atrial fibrillation, at times with RVR, since his prior admission. While in atrial fibrillation, he reports symptoms of palpitations, generalized fatigue, worsened shortness of breath, and exercise intolerance. He is able to perform his chores, but has not been able to mow his lawn or walk with his family in the evenings as he previously did.       In discussion with Mr. Person today, he tells me that he is feeling overall well, despite his persistent symptoms while in atrial fibrillation. He denies any episodes of dizziness, lightheadedness, syncope/presyncope, chest pain or chest discomfort, nausea or vomiting, orthopnea, lower extremity edema, or PND. He reports brief, intermittent palpitations as above, in addition to  "increased fatigue and exercise intolerance. He reports baseline shortness of breath and dyspnea with exertion that he feels are made worse with his atrial fibrillation. He can climb 1 flight of stairs prior to needing to take a break, but feels that he has "slowed down" since his recent atrial fibrillation episode.      Past Medical History:   Diagnosis Date    Anticoagulant long-term use     Diabetes mellitus     Onset late 50s/early 60s    Hyperlipidemia     Hypertension     Onset late 50s/early 60s    Kidney stones     Sleep apnea     since 2006       Past Surgical History:   Procedure Laterality Date    CORONARY ANGIOGRAPHY N/A 9/30/2020    Procedure: ANGIOGRAM, CORONARY ARTERY;  Surgeon: John West MD;  Location: [facility] CATH LAB;  Service: Cardiology;  Laterality: N/A;    CYSTOSCOPY N/A 2/17/2022    Procedure: CYSTOSCOPY;  Surgeon: Lukasz Hughes MD;  Location: [address];  Service: Urology;  Laterality: N/A;    CYSTOSCOPY W/ URETERAL STENT PLACEMENT N/A 2/25/2022    Procedure: CYSTOSCOPY, WITH URETERAL STENT INSERTION;  Surgeon: Lukasz Hughes MD;  Location: [address];  Service: Urology;  Laterality: N/A;    LASER LITHOTRIPSY Left 2/25/2022    Procedure: LITHOTRIPSY, USING LASER;  Surgeon: Lukasz Hughes MD;  Location: [address];  Service: Urology;  Laterality: Left;    LEFT HEART CATHETERIZATION Left 9/30/2020    Procedure: Left heart cath;  Surgeon: John West MD;  Location: [facility] CATH LAB;  Service: Cardiology;  Laterality: Left;    LITHOTRIPSY      PARATHYROIDECTOMY  1/1/2-107    PYELOSCOPY Left 2/25/2022    Procedure: PYELOSCOPY;  Surgeon: Lukasz Hughes MD;  Location: [address];  Service: Urology;  Laterality: Left;    URETEROSCOPIC REMOVAL OF URETERIC CALCULUS Left 2/25/2022    Procedure: REMOVAL, CALCULUS, URETER, URETEROSCOPIC;  Surgeon: Lukasz Hughes MD;  Location: [address];  Service: Urology;  Laterality: Left;    " URETEROSCOPY Left 2022    Procedure: URETEROSCOPY;  Surgeon: Lukasz Hughes MD;  Location: [facility] OR [address] [place] [address];  Service: Urology;  Laterality: Left;       Review of patient's allergies indicates:  No Known Allergies    No current facility-administered medications on file prior to encounter.     Current Outpatient Medications on File Prior to Encounter   Medication Sig    allopurinoL (ZYLOPRIM) 100 MG tablet TAKE 1 TABLET BY MOUTH EVERY DAY    fenofibrate 160 MG Tab TAKE 1 TABLET(160 MG) BY MOUTH EVERY DAY (Patient taking differently: Take by mouth every evening.)    glimepiride (AMARYL) 2 MG tablet TAKE 2 TABLETS BY MOUTH EVERY MORNING    lisinopriL-hydrochlorothiazide (PRINZIDE,ZESTORETIC) 20-25 mg Tab TAKE 1 TABLET BY MOUTH EVERY DAY    metFORMIN (GLUCOPHAGE) 500 MG tablet TAKE 2 TABLETS BY MOUTH EVERY MORNING AND 2 TABLETS WITH DINNER    metoprolol succinate (TOPROL-XL) 50 MG 24 hr tablet Take 1 tablet (50 mg total) by mouth once daily.    potassium citrate (UROCIT-K) 10 mEq (1,080 mg) TbSR TAKE 1 TABLET BY MOUTH TWICE DAILY    rivaroxaban (XARELTO) 20 mg Tab Take 1 tablet (20 mg total) by mouth daily with dinner or evening meal.    rosuvastatin (CRESTOR) 20 MG tablet TAKE 1 TABLET(20 MG) BY MOUTH EVERY DAY (Patient taking differently: Take by mouth every evening.)    tamsulosin (FLOMAX) 0.4 mg Cap Take 1 capsule (0.4 mg total) by mouth once daily.    ACCU-CHEK SOFTCLIX LANCETS Misc USE EVERY DAY    blood sugar diagnostic (ACCU-CHEK ANTONELLA PLUS TEST STRP) Strp Inject 1 strip into the skin 2 (two) times daily before meals.    blood-glucose meter kit Checks blood sugars 1x/daily.    [] Lactobacillus rhamnosus GG (CULTURELLE) 10 billion cell capsule Take 1 capsule by mouth once daily.    nitroGLYCERIN (NITROSTAT) 0.4 MG SL tablet Place 1 tablet (0.4 mg total) under the tongue every 5 (five) minutes as needed for Chest pain. If you need a third tablet, call 911    testosterone (ANDROGEL)  20.25 mg/1.25 gram (1.62 %) GlPm Apply 2 pumps to shoulders daily     Family History       Problem Relation (Age of Onset)    Alcohol abuse Paternal Aunt    Arthritis Father, Mother, Sister, Sister, Brother    Cancer Paternal Grandfather, Brother, Maternal Grandfather    Colon cancer Brother (32), Paternal Grandfather    Colon polyps Paternal Grandfather    Diabetes Father, Paternal Grandmother    Heart disease Father (70)    Hypertension Sister    Kidney disease Father          Tobacco Use    Smoking status: Former Smoker     Packs/day: 1.00     Years: 7.00     Pack years: 7.00     Types: Cigarettes, Cigars     Start date: 1972     Quit date:      Years since quittin.9    Smokeless tobacco: Never Used    Tobacco comment: smoking was off and on.  cumulative 7 years.  never more than three years in one stretch or more lj   Substance and Sexual Activity    Alcohol use: Yes     Alcohol/week: 3.0 standard drinks     Types: 2 Cans of beer, 1 Shots of liquor per week     Comment: don't drink regularly.  when I do, three beers or shots    Drug use: Not Currently     Types: Marijuana    Sexual activity: Not Currently     Partners: Female     Birth control/protection: None     Comment:      Review of Systems   Constitutional: Negative for chills, decreased appetite and fever.   HENT:  Negative for congestion and sore throat.    Eyes:  Negative for blurred vision and discharge.   Cardiovascular:  Negative for chest pain, claudication, cyanosis, dyspnea on exertion and leg swelling.   Respiratory:  Negative for cough, hemoptysis and shortness of breath.    Endocrine: Negative for cold intolerance and heat intolerance.   Skin:  Negative for color change.   Musculoskeletal:  Negative for muscle weakness and myalgias.   Gastrointestinal:  Negative for bloating and abdominal pain.   Neurological:  Negative for dizziness, focal weakness and weakness.   Psychiatric/Behavioral:  Negative for altered mental  status and depression.    Objective:     Vital Signs (Most Recent):  Temp: 97.7 °F (36.5 °C) (04/07/22 1205)  Pulse: 82 (04/07/22 1205)  Resp: 18 (04/07/22 1205)  BP: 120/65 (04/07/22 1207)  SpO2: 96 % (04/07/22 1205) Vital Signs (24h Range):  Temp:  [97.7 °F (36.5 °C)] 97.7 °F (36.5 °C)  Pulse:  [82] 82  Resp:  [18] 18  SpO2:  [96 %] 96 %  BP: (118-120)/(65-66) 120/65     Weight: 115 kg (253 lb 8.5 oz)  Body mass index is 34.38 kg/m².    SpO2: 96 %  O2 Device (Oxygen Therapy): room air    No intake or output data in the 24 hours ending 04/07/22 1253    Lines/Drains/Airways       Peripheral Intravenous Line  Duration                  Peripheral IV - Single Lumen 03/05/22 1400 22 G Left Forearm 32 days         Peripheral IV - Single Lumen 04/07/22 1223 20 G Left Wrist <1 day                    Physical Exam  Constitutional:       Appearance: He is well-developed.   HENT:      Head: Normocephalic and atraumatic.      Right Ear: External ear normal.      Left Ear: External ear normal.   Eyes:      Conjunctiva/sclera: Conjunctivae normal.   Cardiovascular:      Rate and Rhythm: Normal rate. Rhythm irregular.      Pulses: Intact distal pulses.           Radial pulses are 2+ on the right side and 2+ on the left side.      Heart sounds: Normal heart sounds.   Pulmonary:      Effort: Pulmonary effort is normal. No respiratory distress.      Breath sounds: Normal breath sounds. No wheezing.   Abdominal:      General: Bowel sounds are normal. There is no distension.      Palpations: Abdomen is soft.      Tenderness: There is no abdominal tenderness.   Musculoskeletal:         General: Normal range of motion.      Cervical back: Normal range of motion and neck supple.   Skin:     General: Skin is warm and dry.   Neurological:      Mental Status: He is alert and oriented to person, place, and time.         Assessment and Plan:     Paroxysmal atrial fibrillation  66 yo M presenting for elective artie/dccv.    1. ARTIE for evaluation  of LA thrombus  -No absolute contraindications of esophageal stricture, tumor, perforation, laceration,or diverticulum and/or active GI bleed  -The risks, benefits & alternatives of the procedure were explained to the patient.   -The risks of transesophageal echo include but are not limited to:  Dental trauma, esophageal trauma/perforation, bleeding, laryngospasm/brochospasm, aspiration, sore throat/hoarseness, & dislodgement of the endotracheal tube/nasogastric tube (where applicable).    -The risks of moderate sedation include hypotension, respiratory depression, arrhythmias, bronchospasm, & death.    -Informed consent was obtained. The patient is agreeable to proceed with the procedure and all questions and concerns addressed.    Case discussed with an attending in echocardiography lab.     Further recommendations per attending addendum          VTE Risk Mitigation (From admission, onward)    None          Ryann Perez MD  Cardiology   [facility]

## 2022-04-07 NOTE — TRANSFER OF CARE
Anesthesia Transfer of Care Note    Patient: Lukasz Person    Procedure(s) Performed: Procedure(s) (LRB):  Cardioversion or Defibrillation (N/A)  ECHOCARDIOGRAM, TRANSESOPHAGEAL (N/A)    Patient location: Cath Lab    Anesthesia Type: general    Transport from OR: Transported from OR on 2-3 L/min O2 by NC with adequate spontaneous ventilation    Post pain: adequate analgesia    Post assessment: no apparent anesthetic complications and tolerated procedure well    Post vital signs: stable    Level of consciousness: sedated    Nausea/Vomiting: no nausea/vomiting    Complications: none    Transfer of care protocol was followed      Last vitals:   Visit Vitals  /65 (BP Location: Left arm, Patient Position: Lying)   Pulse 82   Temp 36.5 °C (97.7 °F) (Temporal)   Resp 18   Ht 6' (1.829 m)   Wt 115 kg (253 lb 8.5 oz)   SpO2 96%   BMI 34.38 kg/m²

## 2022-04-07 NOTE — DISCHARGE INSTRUCTIONS
To monitor your atrial fibrillation at home, a useful device for purchase is the [employer] Mobile Device by [employer].

## 2022-04-07 NOTE — PROGRESS NOTES
Study title: Efficacy and Safety of Dual Direct Current Cardioversion Versus Single Direct Current  Cardioversion as an Initial Treatment Strategy in Obese Patients   IRB #: [id number]  IRB approval date: 9/15/2020  Sponsor: [facility]    Met with patient in [facility] preop area to follow up on voicemail left yesterday. Briefly explained study, but informed patient that according to his standing weight this morning, his BMI does not qualify him as eligible for the study. Patient voiced understanding. Thanked patient for his time.

## 2022-04-07 NOTE — PLAN OF CARE
Pt arrived to unit accompanied by spouse.  Vss.  Pre op orders and assessment initiated.  Pt remains npo. Pt in no acute distress and verbalizes no complaints.  Will continue to monitor.   Call bell within reach.

## 2022-04-07 NOTE — DISCHARGE SUMMARY
[facility] Stay Cardiac [facility]  Cardiac Electrophysiology  Discharge Summary      Patient Name: Lukasz Person  MRN: [id number]  Admission Date: 4/7/2022  Hospital Length of Stay: 0 days  Discharge Date and Time:  04/07/2022 1:18 PM  Attending Physician: CORINA Lin MD    Discharging Provider: Iris Fisher NP  Primary Care Physician: Kirk Wilson MD    HPI:   Lukasz Person presents to short stay cardiac unit on 04/07/2022 for planned KARSON guided DCCV with Dr. Lin.     Mr. Person is a 67 y.o. male with newly diagnosed pAF, mildly-reduced systolic function with EF 45%, non-obstructive CAD, HTN, HLD, T2DM, CKD IIII, obesity, KUMAR, hyperparathyroidism s/p parathyroidectomy, history of pyelonephritis with lithotripsy, and a history of tobacco use.    He is followed in general cardiology clinic with Dr. Nick and GLENDA Johnson. He developed atrial fibrillation in setting of an acute episode of pyelonephritis, requiring admission from 3/3-6/2022. During this admission, he was noted to have periods of atrial fibrillation with RVR, with a newly discovered decline in systolic function, with LVEF 45%. He was initiated on rate control with metoprolol succinate 50mg po daily in addition to rivaroxaban 20mg po daily. He denies any adverse bleeding events while on this agent. He reports nightly use of his CPAP machine. On serial ECG review, he has remained in atrial fibrillation, at times with RVR, since his prior admission. While in atrial fibrillation, he reports symptoms of palpitations, generalized fatigue, worsening shortness of breath, and exercise intolerance.     Mr. Person was referred to EP and seen by Dr. Lin in clinic. Discused AF as well as different treatment options. Given mild symptoms and reduced EF, would endorse rhythm control. Mr. Person elected to proceed.     EKG:  My independent visualization of most recent EKG is AF at 105 bpm         Procedure(s) (LRB):  Cardioversion or Defibrillation  (N/A)  ECHOCARDIOGRAM, TRANSESOPHAGEAL (N/A)     Indwelling Lines/Drains at time of discharge:  Lines/Drains/Airways     None             Hospital Course:  Mr. Person underwent KARSON without evidence of CAREY thrombus.  Proceeded with DCCV 200 J x 1 shock, converting from AF to sinus rhythm. He tolerated the procedure without any acute complications.  Post-DCCV ECG revealing of sinus rhythm. Instructed to continue PTA medications and start amiodarone with 2 week loading dose. Return in 1 week for ECG and ~ 4 weeks for clinic follow up.   Mr. Person was assessed at bedside prior to discharge. He reported feeling well and denied chest discomfort, shortness of breath, palpitations, lightheadedness, or any other acute symptoms. Discharge instructions were discussed and all questions were answered. Mr. Person was discharged home in stable condition.       Pending Diagnostic Studies:     None          Final Active Diagnoses:    Diagnosis Date Noted POA    Paroxysmal atrial fibrillation [I48.0] 03/08/2022 Yes      Problems Resolved During this Admission:     Paroxysmal atrial fibrillation  s/p KARSON guided DCCV with restoration of sinus rhythm    Plan:  - Continue PTA medications  - AAD: start amiodarone with 2 week loading dose  - Follow up with ECG in 1 week (4/14/22)  - Follow up with Gill Brunson NP ~ 4 weeks  - Discharge plans/instructions discussed with patient who verbalized understanding and agreement of plans of care. No further questions or concerns  voiced at this time.         Discharged Condition: good    Disposition:     Follow Up:   Follow-up Information     Gill Brunson NP Follow up.    Specialty: Cardiology  Why: s/p KARSON/DCCV, amio  Contact information:  [address] [place] [id number]  [phone]                       Patient Instructions:      Other restrictions (specify):   Order Comments: Because you have received sedation for this procedure:  -Limit activity for the remainder of the  day.  -Do not smoke for at least 6 hours and until you are fully awake and alert.  -Do not drink alcoholic beverage for 24 hours.  -Do not drive for 24 hours.  -Defer important decision making until the following day.     Go to the Emergency Department if you develop:   -Bleeding  -Weakness or numbness  -Visual, gait or speech disturbance  -New chest pain, palpitations, shortness of breath, rapid heart beat, or fainting  -Fever    Any need to reschedule or cancel procedures, or any questions regarding your procedures should be addressed directly with the Arrhythmia Department Nurses at the following phone number: [phone].     Notify your health care provider if you experience any of the following:  difficulty breathing or increased cough     Notify your health care provider if you experience any of the following:  persistent dizziness, light-headedness, or visual disturbances     Activity as tolerated     Medications:  Reconciled Home Medications:      Medication List      START taking these medications    amiodarone 200 MG Tab  Commonly known as: PACERONE  Take 2 tablets (400 mg total) by mouth 2 (two) times daily for 14 days, THEN 1 tablet (200 mg total) once daily.  Start taking on: April 7, 2022        CHANGE how you take these medications    fenofibrate 160 MG Tab  TAKE 1 TABLET(160 MG) BY MOUTH EVERY DAY  What changed:   · how much to take  · when to take this     metoprolol succinate 25 MG 24 hr tablet  Commonly known as: TOPROL-XL  Take 1 tablet (25 mg total) by mouth once daily.  What changed:   · medication strength  · how much to take     rosuvastatin 20 MG tablet  Commonly known as: CRESTOR  TAKE 1 TABLET(20 MG) BY MOUTH EVERY DAY  What changed:   · how much to take  · when to take this        CONTINUE taking these medications    ACCU-CHEK SOFTCLIX LANCETS Misc  Generic drug: lancets  USE EVERY DAY     allopurinoL 100 MG tablet  Commonly known as: ZYLOPRIM  TAKE 1 TABLET BY MOUTH EVERY DAY     blood  sugar diagnostic Strp  Commonly known as: ACCU-CHEK ANTONELLA PLUS TEST STRP  Inject 1 strip into the skin 2 (two) times daily before meals.     blood-glucose meter kit  Checks blood sugars 1x/daily.     glimepiride 2 MG tablet  Commonly known as: AMARYL  TAKE 2 TABLETS BY MOUTH EVERY MORNING     lisinopriL-hydrochlorothiazide 20-25 mg Tab  Commonly known as: PRINZIDE,ZESTORETIC  TAKE 1 TABLET BY MOUTH EVERY DAY     metFORMIN 500 MG tablet  Commonly known as: GLUCOPHAGE  TAKE 2 TABLETS BY MOUTH EVERY MORNING AND 2 TABLETS WITH DINNER     nitroGLYCERIN 0.4 MG SL tablet  Commonly known as: NITROSTAT  Place 1 tablet (0.4 mg total) under the tongue every 5 (five) minutes as needed for Chest pain. If you need a third tablet, call 911     potassium citrate 10 mEq (1,080 mg) Tbsr  Commonly known as: UROCIT-K  TAKE 1 TABLET BY MOUTH TWICE DAILY     rivaroxaban 20 mg Tab  Commonly known as: XARELTO  Take 1 tablet (20 mg total) by mouth daily with dinner or evening meal.     tamsulosin 0.4 mg Cap  Commonly known as: FLOMAX  Take 1 capsule (0.4 mg total) by mouth once daily.     testosterone 20.25 mg/1.25 gram (1.62 %) Glpm  Commonly known as: AndroGeL  Apply 2 pumps to shoulders daily        ASK your doctor about these medications    CULTURELLE 10 billion cell capsule  Generic drug: Lactobacillus rhamnosus GG  Take 1 capsule by mouth once daily.  Ask about: Should I take this medication?            Time spent on the discharge of patient: 20 minutes    Iris Fisher NP  Cardiac Electrophysiology  [facility] - Short Stay Cardiac Unit

## 2022-04-08 NOTE — ANESTHESIA POSTPROCEDURE EVALUATION
Anesthesia Post Evaluation    Patient: Lukasz Person    Procedure(s) Performed: Procedure(s) (LRB):  Cardioversion or Defibrillation (N/A)  ECHOCARDIOGRAM, TRANSESOPHAGEAL (N/A)    Final Anesthesia Type: general      Patient location during evaluation: PACU  Patient participation: Yes- Able to Participate  Level of consciousness: awake and alert  Post-procedure vital signs: reviewed and stable  Pain management: adequate  Airway patency: patent    PONV status at discharge: No PONV  Anesthetic complications: no      Cardiovascular status: blood pressure returned to baseline  Respiratory status: unassisted, spontaneous ventilation and room air  Hydration status: euvolemic  Follow-up not needed.          Vitals Value Taken Time   /76 04/07/22 1517   Temp 36.3 °C (97.3 °F) 04/07/22 1412   Pulse 56 04/07/22 1522   Resp 14 04/07/22 1522   SpO2 100 % 04/07/22 1516   Vitals shown include unvalidated device data.      No case tracking events are documented in the log.      Pain/Jeovanny Score: Jeovanny Score: 10 (4/7/2022  3:00 PM)

## 2022-04-11 ENCOUNTER — OFFICE VISIT (OUTPATIENT)
Dept: UROLOGY | Facility: CLINIC | Age: 67
End: 2022-04-11
Payer: MEDICARE

## 2022-04-11 VITALS
HEIGHT: 72 IN | SYSTOLIC BLOOD PRESSURE: 146 MMHG | WEIGHT: 259.25 LBS | DIASTOLIC BLOOD PRESSURE: 67 MMHG | HEART RATE: 56 BPM | BODY MASS INDEX: 35.11 KG/M2

## 2022-04-11 DIAGNOSIS — N52.01 ERECTILE DYSFUNCTION DUE TO ARTERIAL INSUFFICIENCY: ICD-10-CM

## 2022-04-11 DIAGNOSIS — E11.69 HYPERLIPIDEMIA ASSOCIATED WITH TYPE 2 DIABETES MELLITUS: Chronic | ICD-10-CM

## 2022-04-11 DIAGNOSIS — E78.5 HYPERLIPIDEMIA ASSOCIATED WITH TYPE 2 DIABETES MELLITUS: Chronic | ICD-10-CM

## 2022-04-11 DIAGNOSIS — I15.2 HYPERTENSION ASSOCIATED WITH DIABETES: Chronic | ICD-10-CM

## 2022-04-11 DIAGNOSIS — N13.8 BPH WITH URINARY OBSTRUCTION: ICD-10-CM

## 2022-04-11 DIAGNOSIS — E11.59 HYPERTENSION ASSOCIATED WITH DIABETES: Chronic | ICD-10-CM

## 2022-04-11 DIAGNOSIS — N40.1 BPH WITH URINARY OBSTRUCTION: ICD-10-CM

## 2022-04-11 DIAGNOSIS — E11.9 DM TYPE 2 WITHOUT RETINOPATHY: Chronic | ICD-10-CM

## 2022-04-11 DIAGNOSIS — E29.1 MALE HYPOGONADISM: Primary | Chronic | ICD-10-CM

## 2022-04-11 DIAGNOSIS — Z79.899 ENCOUNTER FOR LONG-TERM (CURRENT) USE OF HIGH-RISK MEDICATION: ICD-10-CM

## 2022-04-11 PROBLEM — N18.31 ACUTE RENAL FAILURE SUPERIMPOSED ON STAGE 3A CHRONIC KIDNEY DISEASE: Status: RESOLVED | Noted: 2020-09-24 | Resolved: 2022-04-11

## 2022-04-11 PROBLEM — N17.9 ACUTE RENAL FAILURE SUPERIMPOSED ON STAGE 3A CHRONIC KIDNEY DISEASE: Status: RESOLVED | Noted: 2020-09-24 | Resolved: 2022-04-11

## 2022-04-11 PROBLEM — Z96.0 S/P URETERAL STENT PLACEMENT: Status: RESOLVED | Noted: 2022-03-03 | Resolved: 2022-04-11

## 2022-04-11 PROBLEM — N12 PYELONEPHRITIS: Status: RESOLVED | Noted: 2022-03-03 | Resolved: 2022-04-11

## 2022-04-11 PROCEDURE — 99999 PR PBB SHADOW E&M-EST. PATIENT-LVL IV: CPT | Mod: PBBFAC,,, | Performed by: UROLOGY

## 2022-04-11 PROCEDURE — 99214 PR OFFICE/OUTPT VISIT, EST, LEVL IV, 30-39 MIN: ICD-10-PCS | Mod: S$PBB,,, | Performed by: UROLOGY

## 2022-04-11 PROCEDURE — 99214 OFFICE O/P EST MOD 30 MIN: CPT | Mod: PBBFAC | Performed by: UROLOGY

## 2022-04-11 PROCEDURE — 99999 PR PBB SHADOW E&M-EST. PATIENT-LVL IV: ICD-10-PCS | Mod: PBBFAC,,, | Performed by: UROLOGY

## 2022-04-11 PROCEDURE — 99214 OFFICE O/P EST MOD 30 MIN: CPT | Mod: S$PBB,,, | Performed by: UROLOGY

## 2022-04-11 RX ORDER — TESTOSTERONE 20.25 MG/1.25G
GEL TOPICAL
Qty: 2 EACH | Refills: 5 | Status: SHIPPED | OUTPATIENT
Start: 2022-04-11 | End: 2022-10-13 | Stop reason: SDUPTHER

## 2022-04-11 NOTE — PROGRESS NOTES
CHIEF COMPLAINT:    Mr. Person is a 67 y.o. male presenting with hypogonadism.    PRESENTING ILLNESS:    Lukasz Person is a 67 y.o. male who c/o hypogonadism.   Currently on androgel 1.62% using 4 pumps.  Has more energy on TRT.    He has ED.  Has an Rx for NTG.  He's still talking ICI over with his wife.    He has LUTS.  Nocturia x 1.  Is pleased with how he voids.    REVIEW OF SYSTEMS:    Lukasz Person denies headache, blurred vision, fever, nausea, vomiting, chills, abdominal pain, chest pain, sore throat, bleeding per rectum, cough, SOB, recent loss of consciousness, recent mental status changes, seizures, dizziness, or upper or lower extremity weakness.    LOLA  1. 1  2. 0  3. 0  4. 0  5. 0       PATIENT HISTORY:    Past Medical History:   Diagnosis Date    Anticoagulant long-term use     Diabetes mellitus     Onset late 50s/early 60s    Hyperlipidemia     Hypertension     Onset late 50s/early 60s    Kidney stones     Sleep apnea     since 2006       Past Surgical History:   Procedure Laterality Date    CORONARY ANGIOGRAPHY N/A 9/30/2020    Procedure: ANGIOGRAM, CORONARY ARTERY;  Surgeon: John West MD;  Location: [facility] CATH LAB;  Service: Cardiology;  Laterality: N/A;    CYSTOSCOPY N/A 2/17/2022    Procedure: CYSTOSCOPY;  Surgeon: Lukasz Hughes MD;  Location: [address];  Service: Urology;  Laterality: N/A;    CYSTOSCOPY W/ URETERAL STENT PLACEMENT N/A 2/25/2022    Procedure: CYSTOSCOPY, WITH URETERAL STENT INSERTION;  Surgeon: Lukasz Hughes MD;  Location: [address];  Service: Urology;  Laterality: N/A;    LASER LITHOTRIPSY Left 2/25/2022    Procedure: LITHOTRIPSY, USING LASER;  Surgeon: Lukasz Hughes MD;  Location: [address];  Service: Urology;  Laterality: Left;    LEFT HEART CATHETERIZATION Left 9/30/2020    Procedure: Left heart cath;  Surgeon: John West MD;  Location: [facility] CATH LAB;  Service: Cardiology;  Laterality: Left;    LITHOTRIPSY       PARATHYROIDECTOMY  -107    PYELOSCOPY Left 2022    Procedure: PYELOSCOPY;  Surgeon: Lukasz Hughes MD;  [facility];  Service: Urology;  Laterality: Left;    TRANSESOPHAGEAL ECHOCARDIOGRAPHY N/A 2022    Procedure: ECHOCARDIOGRAM, TRANSESOPHAGEAL;  Surgeon: Xander Diagnostic Provider;  Location: [facility] EP LAB;  Service: Cardiology;  Laterality: N/A;    TREATMENT OF CARDIAC ARRHYTHMIA N/A 2022    Procedure: Cardioversion or Defibrillation;  Surgeon: Iris Fisher NP;  Location: [facility] EP LAB;  Service: Cardiology;  Laterality: N/A;  afib, dccv, artie, anes, EH, 3prep    URETEROSCOPIC REMOVAL OF URETERIC CALCULUS Left 2022    Procedure: REMOVAL, CALCULUS, URETER, URETEROSCOPIC;  Surgeon: Lukasz Hughes MD;  [facility];  Service: Urology;  Laterality: Left;    URETEROSCOPY Left 2022    Procedure: URETEROSCOPY;  Surgeon: Lukasz Hughes MD;  Location: [facility] OR [address] [place] [address];  Service: Urology;  Laterality: Left;       Family History   Problem Relation Age of Onset    Heart disease Father 70        CABG    Arthritis Father     Diabetes Father     Kidney disease Father         had one kidney removed in early thirties    Arthritis Mother     Colon cancer Brother 32    Diabetes Paternal Grandmother     Colon polyps Paternal Grandfather     Colon cancer Paternal Grandfather     Cancer Paternal Grandfather         colon cancer at age 62    Alcohol abuse Paternal Aunt     Arthritis Sister     Arthritis Sister     Arthritis Brother     Cancer Brother         colon cancer at age 32    Cancer Maternal Grandfather         throat cancer    Hypertension Sister        Social History     Socioeconomic History    Marital status:    Tobacco Use    Smoking status: Former Smoker     Packs/day: 1.00     Years: 7.00     Pack years: 7.00     Types: Cigarettes, Cigars     Start date: 1972     Quit date:      Years since quittin.9    Smokeless  tobacco: Never Used    Tobacco comment: smoking was off and on.  cumulative 7 years.  never more than three years in one stretch or more lj   Substance and Sexual Activity    Alcohol use: Yes     Alcohol/week: 3.0 standard drinks     Types: 2 Cans of beer, 1 Shots of liquor per week     Comment: don't drink regularly.  when I do, three beers or shots    Drug use: Not Currently     Types: Marijuana    Sexual activity: Not Currently     Partners: Female     Birth control/protection: None     Comment:      Social Determinants of Health     Financial Resource Strain: Low Risk     Difficulty of Paying Living Expenses: Not very hard   Food Insecurity: No Food Insecurity    Worried About Running Out of Food in the Last Year: Never true    Ran Out of Food in the Last Year: Never true   Transportation Needs: No Transportation Needs    Lack of Transportation (Medical): No    Lack of Transportation (Non-Medical): No   Physical Activity: Insufficiently Active    Days of Exercise per Week: 4 days    Minutes of Exercise per Session: 30 min   Stress: No Stress Concern Present    Feeling of Stress : Only a little   Social Connections: Unknown    Frequency of Communication with Friends and Family: Once a week    Frequency of Social Gatherings with Friends and Family: Once a week    Active Member of Clubs or Organizations: Yes    Attends Club or Organization Meetings: More than 4 times per year    Marital Status:    Housing Stability: High Risk    Unable to Pay for Housing in the Last Year: No    Number of Places Lived in the Last Year: 1    Unstable Housing in the Last Year: Yes       Allergies:  Patient has no known allergies.    Medications:    Current Outpatient Medications:     ACCU-CHEK SOFTCLIX LANCETS Misc, USE EVERY DAY, Disp: 100 each, Rfl: 6    allopurinoL (ZYLOPRIM) 100 MG tablet, TAKE 1 TABLET BY MOUTH EVERY DAY, Disp: 30 tablet, Rfl: 10    amiodarone (PACERONE) 200 MG Tab, Take 2  tablets (400 mg total) by mouth 2 (two) times daily for 14 days, THEN 1 tablet (200 mg total) once daily., Disp: 146 tablet, Rfl: 0    blood sugar diagnostic (ACCU-CHEK ANTONELLA PLUS TEST STRP) Strp, Inject 1 strip into the skin 2 (two) times daily before meals., Disp: 100 strip, Rfl: 11    blood-glucose meter kit, Checks blood sugars 1x/daily., Disp: 1 each, Rfl: 12    fenofibrate 160 MG Tab, TAKE 1 TABLET(160 MG) BY MOUTH EVERY DAY (Patient taking differently: Take by mouth every evening.), Disp: 30 tablet, Rfl: 10    glimepiride (AMARYL) 2 MG tablet, TAKE 2 TABLETS BY MOUTH EVERY MORNING, Disp: 60 tablet, Rfl: 9    lisinopriL-hydrochlorothiazide (PRINZIDE,ZESTORETIC) 20-25 mg Tab, TAKE 1 TABLET BY MOUTH EVERY DAY, Disp: 90 tablet, Rfl: 1    metFORMIN (GLUCOPHAGE) 500 MG tablet, TAKE 2 TABLETS BY MOUTH EVERY MORNING AND 2 TABLETS WITH DINNER, Disp: 120 tablet, Rfl: 10    metoprolol succinate (TOPROL-XL) 25 MG 24 hr tablet, Take 1 tablet (25 mg total) by mouth once daily., Disp: 30 tablet, Rfl: 11    nitroGLYCERIN (NITROSTAT) 0.4 MG SL tablet, Place 1 tablet (0.4 mg total) under the tongue every 5 (five) minutes as needed for Chest pain. If you need a third tablet, call 911, Disp: 60 tablet, Rfl: 12    potassium citrate (UROCIT-K) 10 mEq (1,080 mg) TbSR, TAKE 1 TABLET BY MOUTH TWICE DAILY, Disp: 60 tablet, Rfl: 9    rivaroxaban (XARELTO) 20 mg Tab, Take 1 tablet (20 mg total) by mouth daily with dinner or evening meal., Disp: 30 tablet, Rfl: 11    rosuvastatin (CRESTOR) 20 MG tablet, TAKE 1 TABLET(20 MG) BY MOUTH EVERY DAY (Patient taking differently: Take by mouth every evening.), Disp: 30 tablet, Rfl: 11    tamsulosin (FLOMAX) 0.4 mg Cap, Take 1 capsule (0.4 mg total) by mouth once daily., Disp: 30 capsule, Rfl: 2    testosterone (ANDROGEL) 20.25 mg/1.25 gram (1.62 %) GlPm, Apply 2 pumps to shoulders daily, Disp: 1 each, Rfl: 5    PHYSICAL EXAMINATION:    The patient generally appears in good health, is  appropriately interactive, and is in no apparent distress.     Eyes: anicteric sclerae, moist conjunctivae; no lid-lag; PERRLA     HENT: Atraumatic; oropharynx clear with moist mucous membranes and no mucosal ulcerations;normal hard and soft palate.  No evidence of lymphadenopathy.    Neck: Trachea midline.  No thyromegaly.    Skin: No lesions.    Mental: Cooperative with normal affect.  Is oriented to time, place, and person.    Neuro: Grossly intact.    Chest: Normal inspiratory effort.   No accessory muscles.  No audible wheezes.  Respirations symmetric on inspiration and expiration.    Heart: Regular rhythm.      Abdomen:  Soft, non-tender. No masses or organomegaly. Bladder is not palpable. No evidence of flank discomfort. No evidence of inguinal hernia.    Genitourinary: The penis has no evidence of plaques or induration. The urethral meatus is normal. The testes, epididymides, and cord structures are normal in size and contour bilaterally. The scrotum is normal in size and contour.    Normal anal sphincter tone. No rectal mass.    The prostate is 35 g. Normal landmarks. Lateral sulci. Median furrow intact.  No nodularity or induration. Seminal vesicles are normal.    Extremities: No clubbing, cyanosis, or edema      LABS:      Lab Results   Component Value Date    PSA 1.2 10/06/2021    PSA 0.60 10/17/2020    PSADIAG 1.9 03/22/2022       IMPRESSION:    Encounter Diagnoses   Name Primary?    Male hypogonadism Yes    Erectile dysfunction due to arterial insufficiency     BPH with urinary obstruction     Hypertension associated with diabetes     Hyperlipidemia associated with type 2 diabetes mellitus     DM type 2 without retinopathy      HTN, stable  DM, stable  Hyperlipidemia, stable    PLAN:    1.  He can then RTC 6 months with T, PSA, CBC, hepatic panel, lipid panel to see an SAMUEL.  2.  Continue Androgel 1.62%.  Refilled  2. Discussed options for his ED (affected by above comorbidities)..  He'd like to  discuss ICI with his wife.  3. Will observe his LUTS as they don't bother him.        Copy to:

## 2022-04-13 ENCOUNTER — PATIENT MESSAGE (OUTPATIENT)
Dept: UROLOGY | Facility: CLINIC | Age: 67
End: 2022-04-13
Payer: MEDICARE

## 2022-04-13 ENCOUNTER — TELEPHONE (OUTPATIENT)
Dept: ELECTROPHYSIOLOGY | Facility: CLINIC | Age: 67
End: 2022-04-13
Payer: MEDICARE

## 2022-04-13 ENCOUNTER — PATIENT MESSAGE (OUTPATIENT)
Dept: ELECTROPHYSIOLOGY | Facility: CLINIC | Age: 67
End: 2022-04-13
Payer: MEDICARE

## 2022-04-13 ENCOUNTER — PATIENT MESSAGE (OUTPATIENT)
Dept: ADMINISTRATIVE | Facility: OTHER | Age: 67
End: 2022-04-13
Payer: MEDICARE

## 2022-04-13 ENCOUNTER — OFFICE VISIT (OUTPATIENT)
Dept: CARDIOLOGY | Facility: CLINIC | Age: 67
End: 2022-04-13
Payer: MEDICARE

## 2022-04-13 VITALS
BODY MASS INDEX: 35.29 KG/M2 | SYSTOLIC BLOOD PRESSURE: 136 MMHG | HEIGHT: 72 IN | DIASTOLIC BLOOD PRESSURE: 64 MMHG | WEIGHT: 260.56 LBS | HEART RATE: 52 BPM

## 2022-04-13 DIAGNOSIS — I15.2 HYPERTENSION ASSOCIATED WITH DIABETES: Chronic | ICD-10-CM

## 2022-04-13 DIAGNOSIS — E11.69 OBESITY, DIABETES, AND HYPERTENSION SYNDROME: ICD-10-CM

## 2022-04-13 DIAGNOSIS — G47.33 OSA ON CPAP: Chronic | ICD-10-CM

## 2022-04-13 DIAGNOSIS — E66.01 SEVERE OBESITY (BMI 35.0-35.9 WITH COMORBIDITY): ICD-10-CM

## 2022-04-13 DIAGNOSIS — E66.9 OBESITY, DIABETES, AND HYPERTENSION SYNDROME: ICD-10-CM

## 2022-04-13 DIAGNOSIS — I50.20 HFREF (HEART FAILURE WITH REDUCED EJECTION FRACTION): Primary | ICD-10-CM

## 2022-04-13 DIAGNOSIS — Z12.11 COLON CANCER SCREENING: Primary | ICD-10-CM

## 2022-04-13 DIAGNOSIS — Z79.01 CHRONIC ANTICOAGULATION: ICD-10-CM

## 2022-04-13 DIAGNOSIS — E11.59 HYPERTENSION ASSOCIATED WITH DIABETES: Chronic | ICD-10-CM

## 2022-04-13 DIAGNOSIS — I15.2 OBESITY, DIABETES, AND HYPERTENSION SYNDROME: ICD-10-CM

## 2022-04-13 DIAGNOSIS — I25.10 CORONARY ARTERY DISEASE INVOLVING NATIVE CORONARY ARTERY OF NATIVE HEART WITHOUT ANGINA PECTORIS: ICD-10-CM

## 2022-04-13 DIAGNOSIS — I48.0 PAROXYSMAL ATRIAL FIBRILLATION: ICD-10-CM

## 2022-04-13 DIAGNOSIS — E11.59 OBESITY, DIABETES, AND HYPERTENSION SYNDROME: ICD-10-CM

## 2022-04-13 DIAGNOSIS — E78.2 MIXED HYPERLIPIDEMIA: ICD-10-CM

## 2022-04-13 PROBLEM — E78.1 HYPERTRIGLYCERIDEMIA: Status: RESOLVED | Noted: 2020-08-22 | Resolved: 2022-04-13

## 2022-04-13 PROCEDURE — 99999 PR PBB SHADOW E&M-EST. PATIENT-LVL IV: ICD-10-PCS | Mod: PBBFAC,,, | Performed by: NURSE PRACTITIONER

## 2022-04-13 PROCEDURE — 99214 OFFICE O/P EST MOD 30 MIN: CPT | Mod: PBBFAC,PO | Performed by: NURSE PRACTITIONER

## 2022-04-13 PROCEDURE — 99214 OFFICE O/P EST MOD 30 MIN: CPT | Mod: S$PBB,,, | Performed by: NURSE PRACTITIONER

## 2022-04-13 PROCEDURE — 99999 PR PBB SHADOW E&M-EST. PATIENT-LVL IV: CPT | Mod: PBBFAC,,, | Performed by: NURSE PRACTITIONER

## 2022-04-13 PROCEDURE — 99214 PR OFFICE/OUTPT VISIT, EST, LEVL IV, 30-39 MIN: ICD-10-PCS | Mod: S$PBB,,, | Performed by: NURSE PRACTITIONER

## 2022-04-13 NOTE — TELEPHONE ENCOUNTER
Called pt in regards to appt on 5/13 needing to rescheduled due to book out. Left message and call back number.

## 2022-04-13 NOTE — PATIENT INSTRUCTIONS
Increase cardiovascular exercise to 30 minutes of brisk walking a day for 4-5 days a week.  You can use a stationary bike or swim for 30 minutes a day instead of walking.  Whatever exercise you choose, make sure you are working hard enough to increase your heart rate.

## 2022-04-13 NOTE — TELEPHONE ENCOUNTER
Returned pt's phone call regarding EKG times. Reminded pt that he has an EKG on 4/14 that goes along with the 1 week f/u from having cardioversion done. He also has another one that is prior to the 4wk f/u with Dr. Lin on 5/16. Left message and call back number.

## 2022-04-13 NOTE — PROGRESS NOTES
Mr. Person is a patient of Dr. Nick and was last seen in [facility] 3/17/2022.      Subjective:   Patient ID:  Lukasz Person is a 67 y.o. male who presents for follow-up of Atrial Fibrillation, Hypertension, and Hyperlipidemia    Problem List:  Non-obstructive CAD  Diabetes mellitus since his late 50s/early 60  Hypertension since his late 50s/early 60s  Mixed hyperlipidemia  Paroxysmal atrial fibrillation in setting of pyelonephritis  KUMAR - 2006  Lithotripsy  Parathyroidectomy 2017  7 pack year h/o smoking, quit in 1972    HPI:   Lukasz Person (said Mar) is in clinic today for follow up afib and HF.  His EF on the KARSON dropped to 40% and TTE on 3/18 was 45%.  He had DCCV on 4/7/22.  He has noted some decreased exercise tolerance since his 2 admissions.  His weight declined while admitted for the pyelonephritis to 248 and that has increased to 260. Patient has history of obstructive sleep apnea.  Reports nightly use of CPAP machine and denies any associated sx of KUMAR.    Review of Systems   Constitutional: Negative for decreased appetite, diaphoresis, malaise/fatigue, weight gain and weight loss.   Eyes: Negative for visual disturbance.   Cardiovascular: Positive for dyspnea on exertion. Negative for chest pain, claudication, irregular heartbeat, leg swelling, near-syncope, orthopnea, palpitations, paroxysmal nocturnal dyspnea and syncope.        Denies chest pressure   Respiratory: Negative for cough, hemoptysis, shortness of breath, sleep disturbances due to breathing and snoring.    Endocrine: Negative for cold intolerance and heat intolerance.   Hematologic/Lymphatic: Negative for bleeding problem. Does not bruise/bleed easily.   Musculoskeletal: Negative for myalgias.   Gastrointestinal: Negative for bloating, abdominal pain, anorexia, change in bowel habit, constipation, diarrhea, nausea and vomiting.   Neurological: Negative for difficulty with concentration, disturbances in coordination, excessive daytime  sleepiness, dizziness, headaches, light-headedness, loss of balance, numbness and weakness.   Psychiatric/Behavioral: The patient does not have insomnia.        Allergies and current medications updated and reviewed:  Review of patient's allergies indicates:  No Known Allergies  Current Outpatient Medications   Medication Sig    ACCU-CHEK SOFTCLIX LANCETS Misc USE EVERY DAY    allopurinoL (ZYLOPRIM) 100 MG tablet TAKE 1 TABLET BY MOUTH EVERY DAY    amiodarone (PACERONE) 200 MG Tab Take 2 tablets (400 mg total) by mouth 2 (two) times daily for 14 days, THEN 1 tablet (200 mg total) once daily.    blood sugar diagnostic (ACCU-CHEK ANTONELLA PLUS TEST STRP) Strp Inject 1 strip into the skin 2 (two) times daily before meals.    blood-glucose meter kit Checks blood sugars 1x/daily.    fenofibrate 160 MG Tab TAKE 1 TABLET(160 MG) BY MOUTH EVERY DAY (Patient taking differently: Take by mouth every evening.)    glimepiride (AMARYL) 2 MG tablet TAKE 2 TABLETS BY MOUTH EVERY MORNING    lisinopriL-hydrochlorothiazide (PRINZIDE,ZESTORETIC) 20-25 mg Tab TAKE 1 TABLET BY MOUTH EVERY DAY    metFORMIN (GLUCOPHAGE) 500 MG tablet TAKE 2 TABLETS BY MOUTH EVERY MORNING AND 2 TABLETS WITH DINNER    metoprolol succinate (TOPROL-XL) 25 MG 24 hr tablet Take 1 tablet (25 mg total) by mouth once daily.    nitroGLYCERIN (NITROSTAT) 0.4 MG SL tablet Place 1 tablet (0.4 mg total) under the tongue every 5 (five) minutes as needed for Chest pain. If you need a third tablet, call 911    potassium citrate (UROCIT-K) 10 mEq (1,080 mg) TbSR TAKE 1 TABLET BY MOUTH TWICE DAILY    rivaroxaban (XARELTO) 20 mg Tab Take 1 tablet (20 mg total) by mouth daily with dinner or evening meal.    rosuvastatin (CRESTOR) 20 MG tablet TAKE 1 TABLET(20 MG) BY MOUTH EVERY DAY (Patient taking differently: Take by mouth every evening.)    tamsulosin (FLOMAX) 0.4 mg Cap Take 1 capsule (0.4 mg total) by mouth once daily.    testosterone (ANDROGEL) 20.25 mg/1.25  gram (1.62 %) GlPm Apply 4 pumps to shoulders daily     No current facility-administered medications for this visit.       Objective:        /64   Pulse (!) 52   Ht 6' (1.829 m)   Wt 118.2 kg (260 lb 9.3 oz)   BMI 35.34 kg/m²       Physical Exam  Vitals and nursing note reviewed.   Constitutional:       General: He is not in acute distress.     Appearance: Normal appearance. He is well-developed. He is not diaphoretic.   HENT:      Head: Normocephalic and atraumatic.   Eyes:      General: Lids are normal. No scleral icterus.     Conjunctiva/sclera: Conjunctivae normal.   Neck:      Vascular: Normal carotid pulses. No carotid bruit, hepatojugular reflux or JVD.   Cardiovascular:      Rate and Rhythm: Normal rate and regular rhythm.      Chest Wall: PMI is not displaced.      Pulses: Intact distal pulses.           Carotid pulses are 2+ on the right side and 2+ on the left side.       Radial pulses are 2+ on the right side and 2+ on the left side.        Dorsalis pedis pulses are 2+ on the right side and 2+ on the left side.        Posterior tibial pulses are 1+ on the right side and 1+ on the left side.      Heart sounds: S1 normal and S2 normal. Murmur heard.    Harsh midsystolic murmur is present with a grade of 1/6 at the upper right sternal border radiating to the neck.    No friction rub. No gallop.   Pulmonary:      Effort: Pulmonary effort is normal. No respiratory distress.      Breath sounds: Normal breath sounds. No decreased breath sounds, wheezing, rhonchi or rales.   Chest:      Chest wall: No tenderness.   Abdominal:      General: Bowel sounds are normal. There is no distension or abdominal bruit.      Palpations: Abdomen is soft. There is no fluid wave or pulsatile mass.      Tenderness: There is no abdominal tenderness.   Musculoskeletal:      Cervical back: Neck supple.   Skin:     General: Skin is warm and dry.      Findings: No rash.      Nails: There is no clubbing.   Neurological:       Mental Status: He is alert and oriented to person, place, and time.      Gait: Gait normal.   Psychiatric:         Speech: Speech normal.         Behavior: Behavior normal.         Thought Content: Thought content normal.         Judgment: Judgment normal.         Chemistry        Component Value Date/Time     04/01/2022 0918    K 4.2 04/01/2022 0918     04/01/2022 0918    CO2 26 04/01/2022 0918    BUN 14 04/01/2022 0918    CREATININE 1.5 (H) 04/01/2022 0918     (H) 04/01/2022 0918        Component Value Date/Time    CALCIUM 9.4 04/01/2022 0918    ALKPHOS 33 (L) 03/22/2022 0850    AST 19 03/22/2022 0850    ALT 14 03/22/2022 0850    BILITOT 0.3 03/22/2022 0850    ESTGFRAFRICA 54.5 (A) 04/01/2022 0918    EGFRNONAA 47.2 (A) 04/01/2022 0918        Lab Results   Component Value Date    HGBA1C 7.2 (H) 02/17/2022     Recent Labs   Lab 03/22/22  0850   WBC 5.78   Hemoglobin 13.2 L   Hematocrit 41.6   MCV 94   Platelets 251   TSH 1.677   Cholesterol 94 L   HDL 42   LDL Cholesterol 30.2 L   Triglycerides 109   HDL/Cholesterol Ratio 44.7       Recent Labs   Lab 03/03/22  1851 04/01/22 0918   INR 1.1 1.1        Test(s) Reviewed  I have reviewed the following in detail:  [] Stress test   [] Angiography   [x] Echocardiogram   [x] Labs   [] Other:         Assessment/Plan:   1. HFrEF (heart failure with reduced ejection fraction)  Well compensated.  NYHA class II.  He had some hypotension following DCCV so will not adjust his lisinopril at this time.  If his EF remains reduced next month after restoration of rhythm, then would change him to entresto 49-51 mg BID and do a PET stress test.     2. Hypertension associated with diabetes  BP at goal <130/80. Continue current regimen.  Refer to digital HTN program.       3. Obesity, diabetes, and hypertension syndrome      4. Paroxysmal atrial fibrillation  Rhythm control.  On amiodarone.     5. Chronic anticoagulation  Denies bleeding.  Discussed when to seek  immediate medical attention.       6. Coronary artery disease involving native coronary artery of native heart without angina pectoris  Asymptomatic. Monitor    7. Mixed hyperlipidemia  LDL at goal <70. No changes      8. KUMAR on CPAP  Using cpap. Encouraged nightly use of cpap and annual f/u with sleep clinic.      9. Severe obesity (BMI 35.0-35.9 with comorbidity)  BMI 35.3 Encouraged CV exercise for 30 minutes a day for 5 days a week.        A copy of this note will be forwarded to Dr. Nick and Dr. Wilson.

## 2022-04-14 ENCOUNTER — HOSPITAL ENCOUNTER (OUTPATIENT)
Dept: CARDIOLOGY | Facility: CLINIC | Age: 67
Discharge: HOME | End: 2022-04-14
Payer: MEDICARE

## 2022-04-14 ENCOUNTER — PATIENT MESSAGE (OUTPATIENT)
Dept: ELECTROPHYSIOLOGY | Facility: CLINIC | Age: 67
End: 2022-04-14
Payer: MEDICARE

## 2022-04-14 DIAGNOSIS — I48.0 PAROXYSMAL ATRIAL FIBRILLATION: ICD-10-CM

## 2022-04-14 PROCEDURE — 93005 ELECTROCARDIOGRAM TRACING: CPT | Mod: PBBFAC | Performed by: INTERNAL MEDICINE

## 2022-04-14 PROCEDURE — 93010 RHYTHM STRIP: ICD-10-PCS | Mod: S$PBB,,, | Performed by: INTERNAL MEDICINE

## 2022-04-14 PROCEDURE — 93010 ELECTROCARDIOGRAM REPORT: CPT | Mod: S$PBB,,, | Performed by: INTERNAL MEDICINE

## 2022-04-26 ENCOUNTER — PES CALL (OUTPATIENT)
Dept: ADMINISTRATIVE | Facility: CLINIC | Age: 67
End: 2022-04-26
Payer: MEDICARE

## 2022-05-02 ENCOUNTER — LAB VISIT (OUTPATIENT)
Dept: LAB | Facility: HOSPITAL | Age: 67
End: 2022-05-02
Attending: NURSE PRACTITIONER
Payer: MEDICARE

## 2022-05-02 ENCOUNTER — TELEPHONE (OUTPATIENT)
Dept: PRIMARY CARE CLINIC | Facility: CLINIC | Age: 67
End: 2022-05-02
Payer: MEDICARE

## 2022-05-02 DIAGNOSIS — N18.31 TYPE 2 DIABETES MELLITUS WITH STAGE 3A CHRONIC KIDNEY DISEASE, WITHOUT LONG-TERM CURRENT USE OF INSULIN: Chronic | ICD-10-CM

## 2022-05-02 DIAGNOSIS — E11.22 TYPE 2 DIABETES MELLITUS WITH STAGE 3A CHRONIC KIDNEY DISEASE, WITHOUT LONG-TERM CURRENT USE OF INSULIN: Chronic | ICD-10-CM

## 2022-05-02 LAB
ALBUMIN SERPL BCP-MCNC: 4 G/DL (ref 3.5–5.2)
ALP SERPL-CCNC: 41 U/L (ref 55–135)
ALT SERPL W/O P-5'-P-CCNC: 17 U/L (ref 10–44)
ANION GAP SERPL CALC-SCNC: 10 MMOL/L (ref 8–16)
AST SERPL-CCNC: 21 U/L (ref 10–40)
BILIRUB SERPL-MCNC: 0.4 MG/DL (ref 0.1–1)
BUN SERPL-MCNC: 20 MG/DL (ref 8–23)
CALCIUM SERPL-MCNC: 9.9 MG/DL (ref 8.7–10.5)
CHLORIDE SERPL-SCNC: 101 MMOL/L (ref 95–110)
CO2 SERPL-SCNC: 28 MMOL/L (ref 23–29)
CREAT SERPL-MCNC: 1.5 MG/DL (ref 0.5–1.4)
EST. GFR  (AFRICAN AMERICAN): 54.5 ML/MIN/1.73 M^2
EST. GFR  (NON AFRICAN AMERICAN): 47.2 ML/MIN/1.73 M^2
ESTIMATED AVG GLUCOSE: 148 MG/DL (ref 68–131)
GLUCOSE SERPL-MCNC: 185 MG/DL (ref 70–110)
HBA1C MFR BLD: 6.8 % (ref 4–5.6)
POTASSIUM SERPL-SCNC: 4.6 MMOL/L (ref 3.5–5.1)
PROT SERPL-MCNC: 7 G/DL (ref 6–8.4)
SODIUM SERPL-SCNC: 139 MMOL/L (ref 136–145)

## 2022-05-02 PROCEDURE — 83036 HEMOGLOBIN GLYCOSYLATED A1C: CPT | Performed by: NURSE PRACTITIONER

## 2022-05-02 PROCEDURE — 80053 COMPREHEN METABOLIC PANEL: CPT | Performed by: NURSE PRACTITIONER

## 2022-05-02 PROCEDURE — 36415 COLL VENOUS BLD VENIPUNCTURE: CPT | Mod: PO | Performed by: NURSE PRACTITIONER

## 2022-05-02 NOTE — TELEPHONE ENCOUNTER
Lvm letting pt I scheduled him later in the day, if the time doesn't work, he may call back for changes.

## 2022-05-02 NOTE — TELEPHONE ENCOUNTER
----- Message from Meche Gage sent at 5/2/2022 10:50 AM CDT -----  Contact: Pt [phone]  Pt called and stated that he has to be in [place] on 5/5 and needs to reschedule it to either later in the afternoon or on 5/6.    Please call and advise

## 2022-05-05 ENCOUNTER — OFFICE VISIT (OUTPATIENT)
Dept: INTERNAL MEDICINE | Facility: CLINIC | Age: 67
End: 2022-05-05
Payer: MEDICARE

## 2022-05-05 VITALS
WEIGHT: 260.13 LBS | HEART RATE: 68 BPM | DIASTOLIC BLOOD PRESSURE: 70 MMHG | BODY MASS INDEX: 35.23 KG/M2 | HEIGHT: 72 IN | TEMPERATURE: 97 F | SYSTOLIC BLOOD PRESSURE: 134 MMHG | RESPIRATION RATE: 14 BRPM | OXYGEN SATURATION: 97 %

## 2022-05-05 DIAGNOSIS — E66.9 OBESITY, DIABETES, AND HYPERTENSION SYNDROME: ICD-10-CM

## 2022-05-05 DIAGNOSIS — I15.2 HYPERTENSION ASSOCIATED WITH DIABETES: Chronic | ICD-10-CM

## 2022-05-05 DIAGNOSIS — E66.01 SEVERE OBESITY (BMI 35.0-35.9 WITH COMORBIDITY): ICD-10-CM

## 2022-05-05 DIAGNOSIS — N20.0 NEPHROLITHIASIS: ICD-10-CM

## 2022-05-05 DIAGNOSIS — E11.69 OBESITY, DIABETES, AND HYPERTENSION SYNDROME: ICD-10-CM

## 2022-05-05 DIAGNOSIS — E11.69 HYPERLIPIDEMIA ASSOCIATED WITH TYPE 2 DIABETES MELLITUS: Chronic | ICD-10-CM

## 2022-05-05 DIAGNOSIS — E11.59 OBESITY, DIABETES, AND HYPERTENSION SYNDROME: ICD-10-CM

## 2022-05-05 DIAGNOSIS — I25.10 CORONARY ARTERY DISEASE INVOLVING NATIVE CORONARY ARTERY OF NATIVE HEART WITHOUT ANGINA PECTORIS: ICD-10-CM

## 2022-05-05 DIAGNOSIS — E11.59 HYPERTENSION ASSOCIATED WITH DIABETES: Chronic | ICD-10-CM

## 2022-05-05 DIAGNOSIS — E11.21 TYPE 2 DIABETES MELLITUS WITH DIABETIC NEPHROPATHY, WITHOUT LONG-TERM CURRENT USE OF INSULIN: Primary | ICD-10-CM

## 2022-05-05 DIAGNOSIS — E66.9 OBESITY (BMI 30.0-34.9): ICD-10-CM

## 2022-05-05 DIAGNOSIS — E78.5 HYPERLIPIDEMIA ASSOCIATED WITH TYPE 2 DIABETES MELLITUS: Chronic | ICD-10-CM

## 2022-05-05 DIAGNOSIS — E21.0 HYPERPARATHYROIDISM, PRIMARY: ICD-10-CM

## 2022-05-05 DIAGNOSIS — I48.0 PAROXYSMAL ATRIAL FIBRILLATION: ICD-10-CM

## 2022-05-05 DIAGNOSIS — Z79.01 CHRONIC ANTICOAGULATION: ICD-10-CM

## 2022-05-05 DIAGNOSIS — I15.2 OBESITY, DIABETES, AND HYPERTENSION SYNDROME: ICD-10-CM

## 2022-05-05 PROBLEM — E78.2 MIXED HYPERLIPIDEMIA: Status: RESOLVED | Noted: 2022-03-04 | Resolved: 2022-05-05

## 2022-05-05 PROCEDURE — 99999 PR PBB SHADOW E&M-EST. PATIENT-LVL V: ICD-10-PCS | Mod: PBBFAC,,, | Performed by: NURSE PRACTITIONER

## 2022-05-05 PROCEDURE — 99214 PR OFFICE/OUTPT VISIT, EST, LEVL IV, 30-39 MIN: ICD-10-PCS | Mod: S$PBB,,, | Performed by: NURSE PRACTITIONER

## 2022-05-05 PROCEDURE — 99214 OFFICE O/P EST MOD 30 MIN: CPT | Mod: S$PBB,,, | Performed by: NURSE PRACTITIONER

## 2022-05-05 PROCEDURE — 99999 PR PBB SHADOW E&M-EST. PATIENT-LVL V: CPT | Mod: PBBFAC,,, | Performed by: NURSE PRACTITIONER

## 2022-05-05 PROCEDURE — 99215 OFFICE O/P EST HI 40 MIN: CPT | Mod: PBBFAC,PO | Performed by: NURSE PRACTITIONER

## 2022-05-05 NOTE — PROGRESS NOTES
67 y.o. male here for 7 month f/u for management of T2dm -   Last seen in November 2021 - those notes below.     a1c reduced again from 7% to 6.8%.   He continues on same meds:   Metformin 1000 mg twice per day.   Also on glimepiride 4 mg tablet in morning or at lunch - first meal of day. (he was previously taking at night time).   We had discussed option of trulicity in the past, but he has preferred to work on his own lifestyle/dietary habit changes.   Is motivated to lose some weight, but doesn't like the idea of additional medications just yet.     In the interim, he was diagnosed with a kidney stone - ended up infected, and was in sepsis.   He also developed afib when hospitalized. Had cardioversion.   Is now on xarelto and amiodarone.   He is finally home, and he is feeling much better.   Lost weight while he was in the hospital, but seems to be steadily gaining it back    He is checking the blood sugars 1 - 2 times per day - and keeps a book/log of it. -   He varies between 110 - 130's for the most part.   Is concerned that the morning glucose readings tend to be higher than the evening readings...   He went up to 150 -170's on several occasions, but these are sparingly.   He seemed to be the lowest in the 100's after he lost weight following the hospital stay.                   Last visit notes from November 2021 as follows  66 y.o. alexandra, here for 5 month follow up visit for management of T2dm -   Last seen in June 2021 - those notes are below.     a1c is @ 7%.   Continues on same medications -   Metformin 1000 mg twice per day.   Also on glimepiride 4 mg tablet in morning.   Has gotten a bit off track with eating habits -   Is eating late at night - snacking 10/11pm.   Not exercising currently.   Checking sugars 1 - 2 times per day.   Keeps a log - see below.                      Last visit notes from June 2021:   66 y.o. gentleman, here for follow up office visit for management of T2dm.   Last seen in  December 2020 - those notes are below.     a1c remains stable @ 6.9%.   Continues on metformin 1000 mg twice per day.   Also on glimepiride 4mg tablet in am -    Takes medications/does not skip.   Now is established with Dr. Wilson - following for primary care.   Staying active/following ADA diet.   Checks sugars daily in morning. See logs below. Running 120 - 130's on average.   No acute complaints today's visit -                         Last visit notes as follows from December 2020:   65 y.o. gentleman, here for 3 month follow up visit for T2dm.   Last seen 9/11/2020 - those notes below.     a1c slightly improved from 7.1%---> 6.8%.   Continues on metformin 1000 bid.   On glimepiride 2mg in am with breakfast . Had been taking in evening before and I switched him to morning time to help cover prandial highs early on in the day.   This seems to have helped.   He is trying to follow ADA diet.   Lipids - at goal. Now has restarted his crestor 20mg every Hs.   BP controlled on lisinopril/hctz.   No acute complaints today's visit.       Last visit notes from 9/11/2020 as follows:   HPI: Lukasz Person is a 67 y.o.  male c/I for visit to address Diabetes Type 2  This is the first time I am seeing this patient.   Does not have a pcp, but getting established with Dr. Wilson next month.     was diagnosed with T2DM about 10 years ago.   Has never been hospitalized r/t DM.  Taking metformin 1000mg po bid and also on glimepiride 2mg tablets - taking 2 after dinner.   Denies missing doses of DM medication.   Current a1c is controlled at 7.1%.   Coming in today to establish care, and get further recommendations for management of his diabetes.   He is very motivated to lose weight, eat healthier and manage his over all health more effectively.     Past medical History:   Past Medical History:   Diagnosis Date    Anticoagulant long-term use     Diabetes mellitus     Onset late 50s/early 60s    Hyperlipidemia     Hypertension      Onset late 50s/early 60s    Kidney stones     Sleep apnea     since 2006      Family hx:   Family History   Problem Relation Age of Onset    Heart disease Father 70        CABG    Arthritis Father     Diabetes Father     Kidney disease Father         had one kidney removed in early thirties    Arthritis Mother     Colon cancer Brother 32    Diabetes Paternal Grandmother     Colon polyps Paternal Grandfather     Colon cancer Paternal Grandfather     Cancer Paternal Grandfather         colon cancer at age 62    Alcohol abuse Paternal Aunt     Arthritis Sister     Arthritis Sister     Arthritis Brother     Cancer Brother         colon cancer at age 32    Cancer Maternal Grandfather         throat cancer    Hypertension Sister       Current meds:   Current Outpatient Medications:     ACCU-CHEK SOFTCLIX LANCETS Misc, USE EVERY DAY, Disp: 100 each, Rfl: 6    allopurinoL (ZYLOPRIM) 100 MG tablet, TAKE 1 TABLET BY MOUTH EVERY DAY, Disp: 30 tablet, Rfl: 10    amiodarone (PACERONE) 200 MG Tab, Take 2 tablets (400 mg total) by mouth 2 (two) times daily for 14 days, THEN 1 tablet (200 mg total) once daily., Disp: 146 tablet, Rfl: 0    blood sugar diagnostic (ACCU-CHEK ANTONELLA PLUS TEST STRP) Strp, Inject 1 strip into the skin 2 (two) times daily before meals., Disp: 100 strip, Rfl: 11    blood-glucose meter kit, Checks blood sugars 1x/daily., Disp: 1 each, Rfl: 12    fenofibrate 160 MG Tab, TAKE 1 TABLET(160 MG) BY MOUTH EVERY DAY (Patient taking differently: Take by mouth every evening.), Disp: 30 tablet, Rfl: 10    glimepiride (AMARYL) 2 MG tablet, TAKE 2 TABLETS BY MOUTH EVERY MORNING, Disp: 60 tablet, Rfl: 9    lisinopriL-hydrochlorothiazide (PRINZIDE,ZESTORETIC) 20-25 mg Tab, TAKE 1 TABLET BY MOUTH EVERY DAY, Disp: 90 tablet, Rfl: 1    metFORMIN (GLUCOPHAGE) 500 MG tablet, TAKE 2 TABLETS BY MOUTH EVERY MORNING AND 2 TABLETS WITH DINNER, Disp: 120 tablet, Rfl: 10    metoprolol succinate  (TOPROL-XL) 25 MG 24 hr tablet, Take 1 tablet (25 mg total) by mouth once daily., Disp: 30 tablet, Rfl: 11    nitroGLYCERIN (NITROSTAT) 0.4 MG SL tablet, Place 1 tablet (0.4 mg total) under the tongue every 5 (five) minutes as needed for Chest pain. If you need a third tablet, call 911, Disp: 60 tablet, Rfl: 12    potassium citrate (UROCIT-K) 10 mEq (1,080 mg) TbSR, TAKE 1 TABLET BY MOUTH TWICE DAILY, Disp: 60 tablet, Rfl: 9    rivaroxaban (XARELTO) 20 mg Tab, Take 1 tablet (20 mg total) by mouth daily with dinner or evening meal., Disp: 30 tablet, Rfl: 11    rosuvastatin (CRESTOR) 20 MG tablet, TAKE 1 TABLET(20 MG) BY MOUTH EVERY DAY (Patient taking differently: Take by mouth every evening.), Disp: 30 tablet, Rfl: 11    testosterone (ANDROGEL) 20.25 mg/1.25 gram (1.62 %) GlPm, Apply 4 pumps to shoulders daily, Disp: 2 each, Rfl: 5    tamsulosin (FLOMAX) 0.4 mg Cap, Take 1 capsule (0.4 mg total) by mouth once daily., Disp: 30 capsule, Rfl: 2     Current Diabetes medications:   Metformin 1000 bid  Amaryl 4 mg tablet in morning. Or with lunch time meal.     Review of Pertinent co-morbidities/risk factors:   CV: Denies history of MI nor stroke.   CAD: Denies.  Does not take aspirin 81mg tablet daily  BP: has history of HTN  Statin: Taking  ACE/ARB: Taking    Social History     Tobacco Use   Smoking Status Former Smoker    Packs/day: 1.00    Years: 7.00    Pack years: 7.00    Types: Cigarettes, Cigars    Start date: 1972    Quit date:     Years since quittin.9   Smokeless Tobacco Never Used   Tobacco Comment    smoking was off and on.  cumulative 7 years.  never more than three years in one stretch or more lj      Social:   Lives at home with wife.   Life changes/stressors currently: moved from [place] about 1 year ago.   Diet: not always following ADA diet   Meals: 3 per day and snacks.        Breakfast - eggs, bagel, toast. Cereal with granola, banana.       Lunch - sandwich- turkey or ham        Dinner - tuna fish, stir garcia vegetables with shrimp, orka, lynn peppers, steak        Snacks - chips, crackers         Drinks - tea, water  Exercise: not currently, trying to get back into walking - used to walk 3 miles per day.   Activities: retired.     Glucose Monitoring:   Checking sugars daily in the morning - see lists.     Standards of care:   Eyes: .: 02/08/2022  Foot exam: : 04/21/2021   Diabetes education: None.    Vital Signs  /70 (BP Location: Right arm, Patient Position: Sitting, BP Method: Large (Manual))   Pulse 68   Temp 97.3 °F (36.3 °C) (Temporal)   Resp 14   Ht 6' (1.829 m)   Wt 118 kg (260 lb 2.3 oz)   SpO2 97%   BMI 35.28 kg/m²     Pertinent Labs:   Hgba1c   Lab Results   Component Value Date    HGBA1C 6.8 (H) 05/02/2022     Lipid panel   Lab Results   Component Value Date    CHOL 94 (L) 03/22/2022    CHOL 101 (L) 10/06/2021    CHOL 108 (L) 05/31/2021    CHOL 108 (L) 05/31/2021     Lab Results   Component Value Date    HDL 42 03/22/2022    HDL 36 (L) 10/06/2021    HDL 38 (L) 05/31/2021    HDL 38 (L) 05/31/2021     Lab Results   Component Value Date    LDLCALC 30.2 (L) 03/22/2022    LDLCALC 34.2 (L) 10/06/2021    LDLCALC 32.4 (L) 05/31/2021    LDLCALC 32.4 (L) 05/31/2021     Lab Results   Component Value Date    TRIG 109 03/22/2022    TRIG 154 (H) 10/06/2021    TRIG 188 (H) 05/31/2021    TRIG 188 (H) 05/31/2021     Lab Results   Component Value Date    CHOLHDL 44.7 03/22/2022    CHOLHDL 35.6 10/06/2021    CHOLHDL 35.2 05/31/2021    CHOLHDL 35.2 05/31/2021      CMP  Glucose   Date Value Ref Range Status   05/02/2022 185 (H) 70 - 110 mg/dL Final     BUN   Date Value Ref Range Status   05/02/2022 20 8 - 23 mg/dL Final     Creatinine   Date Value Ref Range Status   05/02/2022 1.5 (H) 0.5 - 1.4 mg/dL Final     eGFR if    Date Value Ref Range Status   05/02/2022 54.5 (A) >60 mL/min/1.73 m^2 Final     eGFR if non    Date Value Ref Range Status    05/02/2022 47.2 (A) >60 mL/min/1.73 m^2 Final     Comment:     Calculation used to obtain the estimated glomerular filtration  rate (eGFR) is the CKD-EPI equation.         Microalbumin creatinine ratio:   Lab Results   Component Value Date    MICALBCREAT 37.0 (H) 05/02/2022       Review Of Systems:   Gen: Appetite good, no weight gain or loss, denies fatigue and weakness. Denies polydipsia.  Skin: Skin is intact and heals well, denies any rashes or hair changes.   Eyes: Denies any acute visual disturbances, nor blurred vision.   Resp: Denies SOB or Dyspnea on exertion, denies cough.   Cardiac: Denies chest pain, palpitations, or swelling.   GI: Denies abdominal pain, nausea or vomiting, diarrhea, or constipation.   /GYN: Denies nocturia, nor burning, frequency or pain on urination.  MS/Neuro: Denies numbness/ tingling in BLE; Gait steady, speech clear - denies headaches.   Psych: Denies drug/ETOH abuse, no hx of depression.  Other systems: negative.    Physical Exam:   GENERAL: Well developed, well nourished in appearance.   PSYCH: AAOx3, appropriate mood and affect, pleasant expression, conversant, appears relaxed, well groomed.   EYES: PERRL, Conjunctiva and corneas clear  NECK: Soft and Supple, trachea midline,   CHEST: Even, regular, and unlabored respirations  ABDOMEN: Soft, non-tender  VASCULAR: pedal pulses palpable bilaterally, no edema.  NEURO:  cranial nerves II - XII intact   MUSCULOSKELETAL: Good ROM, steady gait.   SKIN: Skin warm, dry, and intact   FEET: Pedal pulses palpable and intact.     Assessment and Plan of Care:     Lukasz was seen today for diabetes and follow-up.    Diagnoses and all orders for this visit:    Type 2 diabetes mellitus with diabetic nephropathy, without long-term current use of insulin  -     Hemoglobin A1C; Future  -     Comprehensive Metabolic Panel; Future    Hyperlipidemia associated with type 2 diabetes mellitus    Hypertension associated with diabetes    Coronary  artery disease involving native coronary artery of native heart without angina pectoris    Nephrolithiasis    Severe obesity (BMI 35.0-35.9 with comorbidity)    Obesity, diabetes, and hypertension syndrome    Hyperparathyroidism, primary    Paroxysmal atrial fibrillation    Obesity (BMI 30.0-34.9)    Chronic anticoagulation       1. T2DM with hyperglycemia- Hgba1c goal is 7.5% or less without hypoglycemia - at 7.1%---> 6.8%--> 6.9% --. 7% --> 6.8% current. meets goal.   Would love to start him on trulicity for weight loss and diabetes - he wants to consider.   Until then no changes---> (would stop the amaryl and switch to once weekly trulicity, would do 1 month voucher first then switch to carlotta assist).   Also discussed option of farxiga/jardiance tablet once in morning instead of the DAVIS drug class - for cardiac and renal protection - he will consider.   Continue metformin 1000mg po bid.   Advised men's multivitamin with b12 supplement, B12 depletion.   Changed the amaryl from after dinner to taking with breakfast instead. This should help bring down the breakfast and lunch time bg's.   He was taking the amaryl after dinner in past -  which could bring down sugars too low at bedtime - advised should be taken with a Meal earlier in the day. And he is doing. Advised can skip all together with meal if sugar is low or going into day to expend more energy.   discussed DM, progression of disease, long term complications, CV risk factors and tx options.   Advise compliance with ADA diet and encourage exercise- gave him a dietary plan/handout/food list.   Eyes - reported recent exam - outside provider - will ask for/obtain those records.     2. HTN- controlled, continue meds as previously prescribed and monitor.   Lisinopril/hctz - no urine mac.     3. Hyperlipidemia - LDL goal < 100. At goal.   On crestor 20mg every HS.     4. Weight - BMI Body mass index is 35.28 kg/m².   Encourage Ada diet and exercise.   Would like to  start glp1 or sglt2i.     5. Gout - on allopurinol 100mg daily - no recent flare ups.   Dr. Wilson following.      6. CKD - stage 2 - 3 - will monitor trends -   Also reports history of frequent kidney stones. Nothing recent, should be resolved since overactive parathyroid gland removed.   urine MAC is stable Wnl.     7. Hyperparathyroidism - s/p removal of 1 gland - had been found incidentally when he kept having recurring kidney stones.   Following with primary.     8. KUMAR - managed - had sleep MD appt. Yesterday, wearing cpap at night no issues.      9. History of kidney stones - has been thought secondary to the hyperparathyroidism.   Is taking potassium -citrate to prevent. I suggested to call his urologist if he would like to stop, as his over-active parathyroid has been removed.  He continues on same.   Had infection and was in hospital for this, now resolved.      10. Low T - taking Testosterone injections. Feels more energy. Defer - managed per dr. Wilson.     11. Afib and HF - s/p cardioversion - on bb. Xarelto.   He is following with cardiology.   I iscussed with him that I think he would benefit from sglt2i - he wants to discuss with cards.       Follow up in 6 months with OV and labs prior.

## 2022-05-05 NOTE — PATIENT INSTRUCTIONS
"Continue metformin 1000mg twice per day.   Continue amaryl 4mg in the morning time - breakfast or lunch - first meal of the day.     I do recommend you start either farxiga 5mg tablet daily or Trulicity low angelika 0.75mg every week in place of the amaryl.   Please let me know if you'd like me to prescribe.   Thank you!     Check sugars daily before  a meal - normal fasting glucose - 90 - 130's.   After a meal, it's normal for your glucose to go up - 150 - 180's - but it should return to it's baseline usually in 3 - 4 hours.     Low Carb Snacks & Diabetes friendly foods   Aim for 30 - 40 grams of carbs for 3 meals per day (or 2 meals and  1 - 2 snacks - however you prefer)  Snacks can be 15 - 20 grams of carbs.     Eating small, frequent meals will help you to feel more satisfied, and more full so that you don't over eat or eat the wrong foods later.   Also, paxton meals/snacks allow you to spread carbohydrates evenly, which may stabilize blood sugars.   Below you will find a list of ideas of foods that I like/prefer.   Feel free to give me your ideas and tips if you find good ones too!   Overall - please remember to limit refined sugars such as soft drinks, juices, rices, pastas, breads, cakes.   Look at the back of the label - look at the amount of "carbohydrates", then look at the amount of "fiber" - subtract the amount of fiber from the amount of carbs - this is your "net carb intake".  The more fiber a food has, the better generally, as you get to subtract this from the net carbs.     0-5 gm carb  Crystal Light flavoring (I like fruit punch flavor!)  Vitamin Water Zero  Gwen antioxidant drinks.   Sparkling ice (long skinny flavored water bottles for $1 that are sugar free).   Rayne water, WaterLoo - carbonated flavored conway, various flavors.  Diet coke, diet barqs, sprite zero.  Diet sunkist (orange drink)  Sugar free powerade  Herbal tea, unsweetened  2 tsp peanut butter on celery or carrots  Caleb's original " "Candies - (the Sugar Free ones!)  1/2 cup sugar-free jell-o  1 sugar-free popsicle  ¼ cup blueberries or strawberries  8oz Blue Jinny unsweetened almond milk (or there is sugar free vanilla flavored as well).  5 baby carrots & celery sticks, cucumbers, bell peppers dipped in ¼ cup salsa, 2Tbsp light ranch dressing or 2Tbsp plain Greek yogurt  10 Goldfish crackers  ½ oz low-fat cheese or string cheese  1 closed handful of nuts, unsalted (example-->almonds, pistachios, cashews, peanuts, etc).  1 Tbsp of sunflower seeds, unsalted  1 cup Smart Pop popcorn  1 whole grain brown rice cake   "Think Thin" protein bars - my personal favorite is Creamy Peanut butter, chocolate brownie, or oreo flavors.  "Mcneal" bars  - can be found at [employer] grocery store - they have 5 grams of net carbohydrates.  Quest bars (my favorite is birthday cake, and also cinnamon roll is good melted for 10 seconds in the microwave - please remove the wrapper first!)  Premier protein shakes - sold at [facility], or other brand alternatives - usually 1 - 2 grams of carbs (strawberry, vanilla, chocolate flavors) Coffee flavor is my new morning favorite!   Scrambled eggs! Or a fried egg or boiled eggs - add sliced tomatoes, cilantro or some chopped green onions.   Eggs are good with any and all veggies! You can even eat them for dinner or in a low carb tortilla, or served with your favorite grilled meat/sausage or perry is my favorite.   Check out pre-made egg scrambles in the egg grocery store section - Ore Alma "just crack an egg" is premixed cheese, perry and small amount of pototes, small cup size portions for your breakfast - very convenient!   Janet tristan - zucchini - can buy in the frozen foods section. Birds eye brand is usually cheap. Tip - saute with oil/onions or italian seasoning and use this as a pasta substitution.  Milk - is usually high in carbs/sugar - use resmio milk brand instead - it is "filtered" milk - with half the amount of " "carbs of regular milk. (next to the milk in milk aisle).   Smuckers sugar free Breakfast syrup (or 1 tablespoon of low sugar breakfast syrup) instead of regular syrup.        15 gm carb  1 small piece of fruit or ½ banana or 1/2 cup lite canned fruit  3 miguelangel cracker squares  3 cups Smart Pop popcorn, top spray butter, Nuno lite salt or cinnamon and Truvia  5 Vanilla Wafers  ½ cup low fat, no added sugar ice cream or frozen yogurt (Blue bell, Blue Bunny, Weight Watchers, Skinny Cow)  1/2 - 1 cup Light n' fit Vanilla yogurt (has added protein in it to make you feel full).   ½ turkey, ham, or chicken sandwich  ½ c fruit with ½ c Cottage cheese  4-6 unsalted wheat crackers with 1 oz low fat cheese or 1 tbsp peanut butter   30-45 goldfish crackers (depending on flavor)   7-8 Holiness mini brown rice cakes (caramel, apple cinnamon, chocolate)   12 Holiness mini brown rice cakes (cheddar, bbq, ranch)   1/3 cup hummus dip with raw veg  1/2 whole wheat murtaza, 1Tbsp hummus  Mini Pizza (1/2 whole wheat English muffin, low-fat  cheese, tomato sauce)  100 calorie snack pack (Oreo, Chips Ahoy, Ritz Mix, Baked Cheetos)  4-6 oz. light or Greek Style yogurt (Chobani, Yoplait, Okios, [place])  ½ cup sugar-free pudding    6 in. wheat tortilla or murtaza oven toasted chips (topped with spray butter flavoring, cinnamon, Truvia OR spray butter, garlic powder, chili powder)   18 BBQ Popchips (available at [employer], Whole Foods, [employer])  Mini bagel (small size) toasted - add fat free cream cheese or avocado and sliced tomato on top - yum!   1/2 cup Halo top icecream - birthday cake is my go-to flavor.   Truth Bars - can be found at [employer] market - Net carbs is 11 - 12 grams depending on the flavor.   Kind bars = mostly nuts and dried berries - find at most local groceries stores, drug stores, whole foods, fresh market. Net carbs is around 10 grams.     Smoothie Nestor - I'm often asked "what smoothie is healthy for me". Get the 20 oz. " "Size.   Do not be decieved to think that all smoothies are "healthy". In fact, most are loaded with hidden sugars.   Here are some from the menu that you are allowed to have being diabetic:   The gladiator - any flavor. This is the lowest in carbs (3 - 5 grams only)  Keto champ (berry, chocolate or coffee - all have 14 - 19 grams of carbs in 20 oz size).   The Lean 1 smoothies - vanilla, strawberry and chocolate have under 30 grams of carbs, so this is slightly more. But would be ok for a meal substitute or snack.   The Shredder in Vanilla or chocolate only.  17 grams of carbs - (the strawberry has more sugar considerably)    Tips and Tricks:     Eat an extra vegetable once per day - green veggies (such as broccoli, cauliflower, green beans) - make you feel full and are low in sugar.     My favorite "secret" seasoning to make things extra yummy is cinnamon for sweet taste   For an added secret "salty" taste - add "everything but the bagel" seasoning (you can get on amazon.com or at  joes. I have seen at local groceries such as [employer] too!).     Dark colored fruits are your friend -- blueberries, strawberries, raspberries, blackberries. They have a lower sugar content. So will be the best choice for you.   Light colored fruits are NOT your friend - they tend to be higher in sugar and are not the best options for an ADA diet - examples are oranges, bananas, peaches, watermelon, -- you can eat these, but carefully and in moderation.     WATER. I cannot emphasize drinking water enough - it will hydrate you, make you full. Your body needs it - it's a natural appetite suppressant.   Sometimes hunger and thirst can feel the same. Try drinking some water first, then eat if you are still hungry.     Other snack choices   Celery with peanut butter  Celery with tuna salad  Dill pickles and cheddar cheese (no kidding, it's a great combo)  Nuts (keep raw ones in the freezer if you think you'll overeat them)  Sunflower " "seeds (get them in the shell so it will take longer to eat them)  Other seeds (How to Toast Pumpkin or Squash Seeds)   Pistachios or almonds - will fill you up and are tasty!   Low-Carb Trail Mix  Jerky (beef or turkey -- try to find low-sugar varieties)  Salami slices (you can find in the deli section)  Cheese sticks, such as string cheese  Sugar-free Jello, alone or with cottage cheese and a sprinkling of nuts. Make sugar-free lime Jello with part coconut milk -- For a large package, dissolve the powder in a cup of boiling water, add a can of coconut milk, and then add the rest of the water. Stir well.  Pepperoni "chips" -- Zap the slices in the microwave  Cheese with a few apple slices  4-ounce plain or sugar-free yogurt with berries and flax seed meal  Smoked salmon and cream cheese on cucumber slices  Lettuce Roll-ups -- Roll luncheon meat, egg salad, tuna or other filling and veggies in lettuce leaves  Lunch Meat Roll-ups -- Roll cheese or veggies in lunch meat (read the labels for carbs on the lunch meat)  Spread bean dip, spinach dip, or other low-carb dip or spread on the lunch meat or lettuce and then roll it up  Raw veggies and spinach dip, or other low-carb dip  Pork rinds (Chicharrón), with or without dip  Ricotta cheese with fruit and/or nuts and/or flax seed meal  Mushrooms with cheese spread inside (or other spreads or dips)  Low-carb snack bars (watch out for sugar alcohols, especially maltitol)  Product Review: Atkins Advantage Bars  Pepperoni Chips -- Microwave pepperoni slices until crisp. Great with cheeses and dips  Garlic Parmesan Flax Seed Crackers  Parmesan Crisps -- Good when you want a crunchy snack.  Peanut Butter Protein Balls     "

## 2022-05-06 PROBLEM — E66.9 OBESITY (BMI 30.0-34.9): Status: RESOLVED | Noted: 2022-03-04 | Resolved: 2022-05-06

## 2022-05-06 PROBLEM — E66.811 OBESITY (BMI 30.0-34.9): Status: RESOLVED | Noted: 2022-03-04 | Resolved: 2022-05-06

## 2022-05-09 PROBLEM — Z79.899 ENCOUNTER FOR LONG-TERM (CURRENT) USE OF HIGH-RISK MEDICATION: Status: RESOLVED | Noted: 2022-04-11 | Resolved: 2022-05-09

## 2022-05-11 ENCOUNTER — OFFICE VISIT (OUTPATIENT)
Dept: INTERNAL MEDICINE | Facility: CLINIC | Age: 67
End: 2022-05-11
Payer: MEDICARE

## 2022-05-11 VITALS
HEIGHT: 72 IN | DIASTOLIC BLOOD PRESSURE: 70 MMHG | SYSTOLIC BLOOD PRESSURE: 138 MMHG | OXYGEN SATURATION: 97 % | HEART RATE: 57 BPM | BODY MASS INDEX: 35.47 KG/M2 | RESPIRATION RATE: 14 BRPM | WEIGHT: 261.88 LBS

## 2022-05-11 DIAGNOSIS — E29.1 MALE HYPOGONADISM: Chronic | ICD-10-CM

## 2022-05-11 DIAGNOSIS — M10.00 IDIOPATHIC GOUT, UNSPECIFIED CHRONICITY, UNSPECIFIED SITE: Chronic | ICD-10-CM

## 2022-05-11 DIAGNOSIS — E11.69 HYPERLIPIDEMIA ASSOCIATED WITH TYPE 2 DIABETES MELLITUS: Chronic | ICD-10-CM

## 2022-05-11 DIAGNOSIS — Z13.6 SCREENING FOR AAA (ABDOMINAL AORTIC ANEURYSM): ICD-10-CM

## 2022-05-11 DIAGNOSIS — E11.9 DM TYPE 2 WITHOUT RETINOPATHY: Chronic | ICD-10-CM

## 2022-05-11 DIAGNOSIS — E21.0 HYPERPARATHYROIDISM, PRIMARY: ICD-10-CM

## 2022-05-11 DIAGNOSIS — G89.29 CHRONIC LEFT SHOULDER PAIN: ICD-10-CM

## 2022-05-11 DIAGNOSIS — I50.20 HFREF (HEART FAILURE WITH REDUCED EJECTION FRACTION): ICD-10-CM

## 2022-05-11 DIAGNOSIS — Z79.01 CHRONIC ANTICOAGULATION: ICD-10-CM

## 2022-05-11 DIAGNOSIS — N52.01 ERECTILE DYSFUNCTION DUE TO ARTERIAL INSUFFICIENCY: ICD-10-CM

## 2022-05-11 DIAGNOSIS — E11.69 OBESITY, DIABETES, AND HYPERTENSION SYNDROME: ICD-10-CM

## 2022-05-11 DIAGNOSIS — E78.5 HYPERLIPIDEMIA ASSOCIATED WITH TYPE 2 DIABETES MELLITUS: Chronic | ICD-10-CM

## 2022-05-11 DIAGNOSIS — R26.9 ABNORMALITY OF GAIT AND MOBILITY: ICD-10-CM

## 2022-05-11 DIAGNOSIS — N40.1 BPH WITH URINARY OBSTRUCTION: ICD-10-CM

## 2022-05-11 DIAGNOSIS — E11.36 DIABETES MELLITUS WITH CATARACT: ICD-10-CM

## 2022-05-11 DIAGNOSIS — N20.0 NEPHROLITHIASIS: ICD-10-CM

## 2022-05-11 DIAGNOSIS — I15.2 HYPERTENSION ASSOCIATED WITH DIABETES: Chronic | ICD-10-CM

## 2022-05-11 DIAGNOSIS — I25.10 CORONARY ARTERY DISEASE INVOLVING NATIVE CORONARY ARTERY OF NATIVE HEART WITHOUT ANGINA PECTORIS: ICD-10-CM

## 2022-05-11 DIAGNOSIS — N18.30 TYPE 2 DIABETES MELLITUS WITH STAGE 3 CHRONIC KIDNEY DISEASE, WITHOUT LONG-TERM CURRENT USE OF INSULIN, UNSPECIFIED WHETHER STAGE 3A OR 3B CKD: Chronic | ICD-10-CM

## 2022-05-11 DIAGNOSIS — E11.8 DIABETIC FEET: ICD-10-CM

## 2022-05-11 DIAGNOSIS — N13.8 BPH WITH URINARY OBSTRUCTION: ICD-10-CM

## 2022-05-11 DIAGNOSIS — E66.01 SEVERE OBESITY (BMI 35.0-35.9 WITH COMORBIDITY): ICD-10-CM

## 2022-05-11 DIAGNOSIS — E11.59 OBESITY, DIABETES, AND HYPERTENSION SYNDROME: ICD-10-CM

## 2022-05-11 DIAGNOSIS — Z00.00 ENCOUNTER FOR PREVENTIVE HEALTH EXAMINATION: Primary | ICD-10-CM

## 2022-05-11 DIAGNOSIS — G47.33 OSA ON CPAP: Chronic | ICD-10-CM

## 2022-05-11 DIAGNOSIS — R74.8 LOW SERUM ALKALINE PHOSPHATASE: ICD-10-CM

## 2022-05-11 DIAGNOSIS — E11.22 TYPE 2 DIABETES MELLITUS WITH STAGE 3 CHRONIC KIDNEY DISEASE, WITHOUT LONG-TERM CURRENT USE OF INSULIN, UNSPECIFIED WHETHER STAGE 3A OR 3B CKD: Chronic | ICD-10-CM

## 2022-05-11 DIAGNOSIS — I15.2 OBESITY, DIABETES, AND HYPERTENSION SYNDROME: ICD-10-CM

## 2022-05-11 DIAGNOSIS — I48.0 PAROXYSMAL ATRIAL FIBRILLATION: ICD-10-CM

## 2022-05-11 DIAGNOSIS — E66.9 OBESITY, DIABETES, AND HYPERTENSION SYNDROME: ICD-10-CM

## 2022-05-11 DIAGNOSIS — M25.512 CHRONIC LEFT SHOULDER PAIN: ICD-10-CM

## 2022-05-11 DIAGNOSIS — E11.59 HYPERTENSION ASSOCIATED WITH DIABETES: Chronic | ICD-10-CM

## 2022-05-11 PROCEDURE — 99999 PR PBB SHADOW E&M-EST. PATIENT-LVL V: ICD-10-PCS | Mod: PBBFAC,,, | Performed by: NURSE PRACTITIONER

## 2022-05-11 PROCEDURE — G0439 PPPS, SUBSEQ VISIT: HCPCS | Mod: ,,, | Performed by: NURSE PRACTITIONER

## 2022-05-11 PROCEDURE — 99215 OFFICE O/P EST HI 40 MIN: CPT | Mod: PBBFAC,PO | Performed by: NURSE PRACTITIONER

## 2022-05-11 PROCEDURE — G0439 PR MEDICARE ANNUAL WELLNESS SUBSEQUENT VISIT: ICD-10-PCS | Mod: ,,, | Performed by: NURSE PRACTITIONER

## 2022-05-11 PROCEDURE — 99999 PR PBB SHADOW E&M-EST. PATIENT-LVL V: CPT | Mod: PBBFAC,,, | Performed by: NURSE PRACTITIONER

## 2022-05-11 NOTE — PATIENT INSTRUCTIONS
Counseling and Referral of Other Preventative  (Italic type indicates deductible and co-insurance are waived)    Patient Name: Lukasz Person  Today's Date: 5/11/2022    Health Maintenance       Date Due Completion Date    Foot Exam Podiatry referral   4/21/2021    Colorectal Cancer Screening Due- copy of order & number given   ---    COVID-19 Vaccine (4 - Booster for Moderna series) 06/02/2022   Considering   11/4/2021    Abdominal Aortic Aneurysm Screening Ordered   ---    Shingles Vaccine (1 of 2) Discuss need for titer with PCP at next visit   ---    Hemoglobin A1c 11/02/2022 5/2/2022    Eye Exam 2/8/2023 2/8/2022    Lipid Panel 03/22/2023   3/22/2022    Diabetes Urine Screening 05/02/2023 5/2/2022    High Dose Statin 05/11/2023 5/11/2022    TETANUS VACCINE       10/13/2030   10/13/2020        Orders Placed This Encounter   Procedures    Ambulatory referral/consult to Podiatry    CV AAA Screening     The following information is provided to all patients.  This information is to help you find resources for any of the problems found today that may be affecting your health:                Living healthy guide: www.[facility].[place].gov      Understanding Diabetes: www.diabetes.org      Eating healthy: www.cdc.gov/healthyweight      CDC home safety checklist: www.cdc.gov/steadi/patient.html      Agency on Aging: www.goea.[place].[facility]      Alcoholics anonymous (AA): www.aa.org      Physical Activity: www.josselyn.nih.gov/vr4wwtj      Tobacco use: www.quitwithusla.org

## 2022-05-11 NOTE — PROGRESS NOTES
Lukasz Person presented for a  Medicare AWV and comprehensive Health Risk Assessment today. The following components were reviewed and updated:    · Medical history  · Family History  · Social history  · Allergies and Current Medications  · Health Risk Assessment  · Health Maintenance  · Care Team     ** See Completed Assessments for Annual Wellness Visit within the encounter summary.**       The following assessments were completed:  · Living Situation  · CAGE  · Depression Screening  · Timed Get Up and Go  · Whisper Test  · Cognitive Function Screening  · Nutrition Screening  · ADL Screening  · PAQ Screening    Vitals:    05/11/22 1311   BP: 138/70   BP Location: Left arm   Pulse: (!) 57   Resp: 14   SpO2: 97%   Weight: 118.8 kg (261 lb 14.4 oz)   Height: 6' (1.829 m)     Body mass index is 35.52 kg/m².  Physical Exam  Constitutional:       Appearance: He is well-developed.   HENT:      Head: Normocephalic.      Comments: Wears face mask     Right Ear: External ear normal.      Left Ear: External ear normal.   Cardiovascular:      Rate and Rhythm: Normal rate and regular rhythm.   Pulmonary:      Effort: Pulmonary effort is normal.      Breath sounds: Normal breath sounds.   Abdominal:      Palpations: Abdomen is soft.      Comments: obese   Musculoskeletal:         General: No swelling.   Skin:     General: Skin is warm and dry.   Neurological:      Mental Status: He is alert and oriented to person, place, and time.      Motor: No abnormal muscle tone.      Deep Tendon Reflexes: Reflexes are normal and symmetric.   Psychiatric:         Mood and Affect: Mood normal.         Behavior: Behavior normal.         Thought Content: Thought content normal.         Judgment: Judgment normal.           Lab Results   Component Value Date    HGBA1C 6.8 (H) 05/02/2022    CHOL 94 (L) 03/22/2022    HDL 42 03/22/2022    LDLCALC 30.2 (L) 03/22/2022    TRIG 109 03/22/2022    CHOLHDL 44.7 03/22/2022      Lab Results   Component Value  Date    PSA 1.2 10/06/2021     No results found for this or any previous visit.  No components found for: [id number]          Diagnoses and health risks identified today and associated recommendations/orders:    1. BPH with urinary obstruction  Stable- followed by PCP, urology    2. Chronic anticoagulation  Stable- followed by cardiology    3. Diabetes mellitus with cataract  Stable- followed by ophth    4. Coronary artery disease involving native coronary artery of native heart without angina pectoris  Stable- followed by cardiology    5. Erectile dysfunction due to arterial insufficiency  Stable- followed by PCP, urology      6. HFrEF (heart failure with reduced ejection fraction)  Stable- followed by cardiology    7. Hyperlipidemia associated with type 2 diabetes mellitus  Stable- followed by cardiology    8. Hyperparathyroidism, primary  Stable- followed by PCP, urology      9. Hypertension associated with diabetes  Stable- followed by cardiology    10. Low serum alkaline phosphatase  Stable- followed by PCP    11. Male hypogonadism  Stable- followed by PCP, urology      12. Nephrolithiasis  Stable- followed by PCP, urology      13. Obesity, diabetes, and hypertension syndrome  Stable- followed by cardiology    14. KUMAR on CPAP  Stable- followed by sleep med    15. Paroxysmal atrial fibrillation  Stable- followed by cardiology    16. Idiopathic gout, unspecified chronicity, unspecified site  Stable- followed by PCP, urology      17. DM type 2 without retinopathy  Stable- followed by opth    18. Type 2 diabetes mellitus with stage 3 chronic kidney disease, without long-term current use of insulin, unspecified whether stage 3a or 3b CKD  Stable- followed by PCP, Vera Palacios NP    19. Screening for AAA (abdominal aortic aneurysm)  Stable- followed by cardiology  - CV AAA Screening; Future    20. Diabetic feet  Stable- followed by PCP  - Ambulatory referral/consult to Podiatry; Future    21. Chronic left shoulder  pain  Stable- followed by PCP    22. Severe obesity (BMI 35.0-35.9 with comorbidity)  Chronic Followed by PCP.   Centers for Disease Control and Prevention (CDC)  weight recommendations for current BMI & ideal BMI range discussed with patient.  Recommended  gradual weight loss,  Combo mediterranean diet diabetic diet , startstructured regular exercise every day.      23. Abnormality of gait and mobility  Stable- followed by PCP    24. Encounter for preventive health examination  Here for Health Risk Assessment/Annual Wellness Visit.  Health maintenance reviewed and updated. Follow up in one year.         Provided Lukasz with a 5-10 year written screening schedule and personal prevention plan. Recommendations were developed using the USPSTF age appropriate recommendations. Education, counseling, and referrals were provided as needed. After Visit Summary printed and given to patient which includes a list of additional screenings\tests needed. Home bs 100-127 fasting- followed by Vera- much improved with A1C. Uses CPAP. Colonoscopy due- copy of order given for pt to call & arrange. Requesting titer before shingles vaccine - deferred to PCP to order. On xarelto- Fall prevention. Medicare care gaps reviewed - ordered AAA screen & podiatry. Problem list reviewed & updated with patient.    No follow-ups on file.    MARYANN Mustafa offered to discuss advanced care planning, including how to pick a person who would make decisions for you if you were unable to make them for yourself, called a health care power of , and what kind of decisions you might make such as use of life sustaining treatments such as ventilators and tube feeding when faced with a life limiting illness recorded on a living will that they will need to know. (How you want to be cared for as you near the end of your natural life)     X Patient is interested in learning more about how to make advanced directives.  I provided them paperwork  and offered to discuss this with them.

## 2022-05-13 ENCOUNTER — TELEPHONE (OUTPATIENT)
Dept: ELECTROPHYSIOLOGY | Facility: CLINIC | Age: 67
End: 2022-05-13
Payer: MEDICARE

## 2022-05-13 NOTE — TELEPHONE ENCOUNTER
Called pt to confirm appointment on Monday 5/16/22 with Dr. Lin. No answer, left message and call back number.

## 2022-05-16 ENCOUNTER — HOSPITAL ENCOUNTER (OUTPATIENT)
Dept: CARDIOLOGY | Facility: CLINIC | Age: 67
Discharge: HOME | End: 2022-05-16
Payer: MEDICARE

## 2022-05-16 ENCOUNTER — OFFICE VISIT (OUTPATIENT)
Dept: ELECTROPHYSIOLOGY | Facility: CLINIC | Age: 67
End: 2022-05-16
Payer: MEDICARE

## 2022-05-16 VITALS
SYSTOLIC BLOOD PRESSURE: 131 MMHG | DIASTOLIC BLOOD PRESSURE: 59 MMHG | HEART RATE: 59 BPM | BODY MASS INDEX: 35.44 KG/M2 | WEIGHT: 261.69 LBS | HEIGHT: 72 IN

## 2022-05-16 DIAGNOSIS — E66.9 CLASS 2 OBESITY WITH BODY MASS INDEX (BMI) OF 35.0 TO 35.9 IN ADULT, UNSPECIFIED OBESITY TYPE, UNSPECIFIED WHETHER SERIOUS COMORBIDITY PRESENT: ICD-10-CM

## 2022-05-16 DIAGNOSIS — I25.10 CORONARY ARTERY DISEASE INVOLVING NATIVE CORONARY ARTERY OF NATIVE HEART WITHOUT ANGINA PECTORIS: ICD-10-CM

## 2022-05-16 DIAGNOSIS — N18.30 STAGE 3 CHRONIC KIDNEY DISEASE, UNSPECIFIED WHETHER STAGE 3A OR 3B CKD: ICD-10-CM

## 2022-05-16 DIAGNOSIS — Z90.89 HISTORY OF PARATHYROIDECTOMY: ICD-10-CM

## 2022-05-16 DIAGNOSIS — G47.33 OSA ON CPAP: ICD-10-CM

## 2022-05-16 DIAGNOSIS — Z98.890 HISTORY OF PARATHYROIDECTOMY: ICD-10-CM

## 2022-05-16 DIAGNOSIS — N18.30 TYPE 2 DIABETES MELLITUS WITH STAGE 3 CHRONIC KIDNEY DISEASE, WITHOUT LONG-TERM CURRENT USE OF INSULIN, UNSPECIFIED WHETHER STAGE 3A OR 3B CKD: ICD-10-CM

## 2022-05-16 DIAGNOSIS — Z87.891 HISTORY OF TOBACCO USE: ICD-10-CM

## 2022-05-16 DIAGNOSIS — I48.91 ATRIAL FIBRILLATION, UNSPECIFIED TYPE: ICD-10-CM

## 2022-05-16 DIAGNOSIS — Z98.890 HISTORY OF LITHOTRIPSY: ICD-10-CM

## 2022-05-16 DIAGNOSIS — I10 PRIMARY HYPERTENSION: ICD-10-CM

## 2022-05-16 DIAGNOSIS — E78.2 MIXED HYPERLIPIDEMIA: ICD-10-CM

## 2022-05-16 DIAGNOSIS — E11.22 TYPE 2 DIABETES MELLITUS WITH STAGE 3 CHRONIC KIDNEY DISEASE, WITHOUT LONG-TERM CURRENT USE OF INSULIN, UNSPECIFIED WHETHER STAGE 3A OR 3B CKD: ICD-10-CM

## 2022-05-16 DIAGNOSIS — I48.0 PAROXYSMAL ATRIAL FIBRILLATION: Primary | ICD-10-CM

## 2022-05-16 DIAGNOSIS — I50.20 HFREF (HEART FAILURE WITH REDUCED EJECTION FRACTION): ICD-10-CM

## 2022-05-16 DIAGNOSIS — I35.8 AORTIC VALVE SCLEROSIS: ICD-10-CM

## 2022-05-16 DIAGNOSIS — E66.9 CLASS 1 OBESITY WITH BODY MASS INDEX (BMI) OF 34.0 TO 34.9 IN ADULT, UNSPECIFIED OBESITY TYPE, UNSPECIFIED WHETHER SERIOUS COMORBIDITY PRESENT: ICD-10-CM

## 2022-05-16 DIAGNOSIS — E21.0 HYPERPARATHYROIDISM, PRIMARY: ICD-10-CM

## 2022-05-16 DIAGNOSIS — Z87.448 HISTORY OF PYELONEPHRITIS: ICD-10-CM

## 2022-05-16 PROCEDURE — 99213 OFFICE O/P EST LOW 20 MIN: CPT | Mod: PBBFAC | Performed by: STUDENT IN AN ORGANIZED HEALTH CARE EDUCATION/TRAINING PROGRAM

## 2022-05-16 PROCEDURE — 99999 PR PBB SHADOW E&M-EST. PATIENT-LVL III: ICD-10-PCS | Mod: PBBFAC,,, | Performed by: STUDENT IN AN ORGANIZED HEALTH CARE EDUCATION/TRAINING PROGRAM

## 2022-05-16 PROCEDURE — 93010 ELECTROCARDIOGRAM REPORT: CPT | Mod: S$PBB,,, | Performed by: INTERNAL MEDICINE

## 2022-05-16 PROCEDURE — 99999 PR PBB SHADOW E&M-EST. PATIENT-LVL III: CPT | Mod: PBBFAC,,, | Performed by: STUDENT IN AN ORGANIZED HEALTH CARE EDUCATION/TRAINING PROGRAM

## 2022-05-16 PROCEDURE — 99214 OFFICE O/P EST MOD 30 MIN: CPT | Mod: S$PBB,,, | Performed by: STUDENT IN AN ORGANIZED HEALTH CARE EDUCATION/TRAINING PROGRAM

## 2022-05-16 PROCEDURE — 93010 RHYTHM STRIP: ICD-10-PCS | Mod: S$PBB,,, | Performed by: INTERNAL MEDICINE

## 2022-05-16 PROCEDURE — 99214 PR OFFICE/OUTPT VISIT, EST, LEVL IV, 30-39 MIN: ICD-10-PCS | Mod: S$PBB,,, | Performed by: STUDENT IN AN ORGANIZED HEALTH CARE EDUCATION/TRAINING PROGRAM

## 2022-05-16 PROCEDURE — 93005 ELECTROCARDIOGRAM TRACING: CPT | Mod: PBBFAC | Performed by: INTERNAL MEDICINE

## 2022-05-16 NOTE — PROGRESS NOTES
Electrophysiology Clinic Note    Reason for visit:  Evaluation and recommendations regarding paroxysmal atrial fibrillation.     PRESENTING HISTORY:     History of Present Illness:    Mr. Lukasz Person is a randa 67-year-old gentleman who presents today for evaluation and recommendations regarding paroxysmal atrial fibrillation. He has a past medical history significant for a new diagnosis of paroxysmal atrial fibrillation, mildly-reduced systolic function with LVEF 45%, non-obstructive coronary artery disease, hypertension, hyperlipidemia, type II diabetes mellitus, CKD stage III, aortic sclerosis without stenosis, obesity, obstructive sleep apnea, hyperparathyroidism s/p parathyroidectomy, a history of pyelonephritis with lithotripsy, and a history of tobacco use.    He is followed in general cardiology clinic with Dr. Nick and GLENDA Johnson. He developed atrial fibrillation in setting of an acute episode of pyelonephritis, requiring admission from 3/3-6/2022. At that admission, he was noted to have fever and chills, with a pelvic CT renal study revealing left perinephric fat stranding indicating pyelonephritis. He remained persistently febrile up to 103 F despite initiating IV rocephin and Tylenol for antipyretic therapy. Urology advised broadened antibiotic coverage, in addition to antifungal coverage, with recommendations to leave his recently placed uretal stent in place until after his acute event resolved. He was discharged home with ciprofloxacin and close urology follow-up. During this admission, he was noted to have periods of atrial fibrillation with RVR, with a newly discovered decline in systolic function, with LVEF 45%. He was initiated on rate control with metoprolol succinate 50mg po daily in addition to rivaroxaban 20 mg po daily. He denies any adverse bleeding events while on this agent. He reports nightly use of his CPAP machine. On serial ECG review, he has remained in atrial fibrillation, at  times with RVR, since his prior admission. While in atrial fibrillation, he reports symptoms of palpitations, generalized fatigue, worsened shortness of breath, and exercise intolerance. He is able to perform his chores, but has not been able to mow his lawn or walk with his family in the evenings as he previously did.      Ms. Person was seen in arrhythmia clinic in March where he agreed to a KARSON/DV. The procedure was done 4/7/2022 after which he was started on amiodarone. He reports that he had mild/moderate symptoms in fib and  has since done well. He reports that he like he has more energy in sinus rhythm. He doesn't think he had had recurrence of atrial fibrillation. He denies any episodes of dizziness, lightheadedness, syncope/presyncope, chest pain or chest discomfort, nausea or vomiting, orthopnea, lower extremity edema, or PND. He is currently on amiodarone 200 mg twice a day and on anticoagulation with rivaroxaban. He is on metoprolol 25 mg daily and and ACE inhibitor. He is adherent to his cardiac medicatons.         ECG today shows normal sinus rhythm,  Normal mean QRS axis and normal intervals.       Review of Systems   Constitutional: Positive for activity change and fatigue. Negative for chills and fever.        Exercise intolerance while in atrial fibrillation.    HENT: Negative for nasal congestion, nosebleeds, postnasal drip, rhinorrhea, sinus pressure/congestion, sneezing and sore throat.    Respiratory: Positive for shortness of breath. Negative for apnea, cough, chest tightness and wheezing.    Cardiovascular: Positive for palpitations. Negative for chest pain, leg swelling and claudication.   Gastrointestinal: Negative for abdominal distention, abdominal pain, blood in stool, change in bowel habit, constipation, diarrhea, nausea, vomiting and change in bowel habit.   Genitourinary: Negative for dysuria and hematuria.   Musculoskeletal: Negative for gait problem.   Neurological: Negative for  dizziness, seizures, syncope, weakness, light-headedness, headaches, disturbances in coordination and coordination difficulties.        PAST HISTORY:     Past Medical History:   Diagnosis Date    Anticoagulant long-term use     Diabetes mellitus     Onset late 50s/early 60s    Hyperlipidemia     Hypertension     Onset late 50s/early 60s    Kidney stones     Sleep apnea     since 2006       Past Surgical History:   Procedure Laterality Date    CORONARY ANGIOGRAPHY N/A 9/30/2020    Procedure: ANGIOGRAM, CORONARY ARTERY;  Surgeon: John West MD;  Location: [facility] CATH LAB;  Service: Cardiology;  Laterality: N/A;    CYSTOSCOPY N/A 2/17/2022    Procedure: CYSTOSCOPY;  Surgeon: Lukasz Hughes MD;  Location: [facility] OR [address] [place] [address];  Service: Urology;  Laterality: N/A;    CYSTOSCOPY W/ URETERAL STENT PLACEMENT N/A 2/25/2022    Procedure: CYSTOSCOPY, WITH URETERAL STENT INSERTION;  Surgeon: Lukasz Hughes MD;  Location: [facility] OR [address] [place] [address];  Service: Urology;  Laterality: N/A;    LASER LITHOTRIPSY Left 2/25/2022    Procedure: LITHOTRIPSY, USING LASER;  Surgeon: Lukasz Hughes MD;  Location: [facility] OR [address] [place] [address];  Service: Urology;  Laterality: Left;    LEFT HEART CATHETERIZATION Left 9/30/2020    Procedure: Left heart cath;  Surgeon: John West MD;  Location: [facility] CATH LAB;  Service: Cardiology;  Laterality: Left;    LITHOTRIPSY      PARATHYROIDECTOMY  1/1/2-107    PYELOSCOPY Left 2/25/2022    Procedure: PYELOSCOPY;  Surgeon: Lukasz Hughes MD;  Location: [address];  Service: Urology;  Laterality: Left;    TRANSESOPHAGEAL ECHOCARDIOGRAPHY N/A 4/7/2022    Procedure: ECHOCARDIOGRAM, TRANSESOPHAGEAL;  Surgeon: Xander Diagnostic Provider;  Location: [facility] EP LAB;  Service: Cardiology;  Laterality: N/A;    TREATMENT OF CARDIAC ARRHYTHMIA N/A 4/7/2022    Procedure: Cardioversion or Defibrillation;  Surgeon: Iris Fisher NP;  Location: [facility] EP LAB;  Service: Cardiology;  Laterality: N/A;   afib, dccv, artie, anes, EH, 3prep    URETEROSCOPIC REMOVAL OF URETERIC CALCULUS Left 2/25/2022    Procedure: REMOVAL, CALCULUS, URETER, URETEROSCOPIC;  Surgeon: Lukasz Hughes MD;  Location: [facility] OR [address] [place] [address];  Service: Urology;  Laterality: Left;    URETEROSCOPY Left 2/25/2022    Procedure: URETEROSCOPY;  Surgeon: Lukasz Hughes MD;  Location: [facility] OR [address] [place] [address];  Service: Urology;  Laterality: Left;       Family History:  Family History   Problem Relation Age of Onset    Arthritis Mother     Heart disease Father 70        CABG    Arthritis Father     Diabetes Father     Kidney disease Father         had one kidney removed in early thirties    Arthritis Sister     Arthritis Sister     Hypertension Sister     Colon cancer Brother 32    Arthritis Brother     Alcohol abuse Paternal Aunt     Cancer Maternal Grandfather         throat cancer    Diabetes Paternal Grandmother     Colon polyps Paternal Grandfather     Colon cancer Paternal Grandfather     Cancer Paternal Grandfather         colon cancer at age 62       Social History:  He  reports that he quit smoking about 27 years ago. His smoking use included cigarettes and cigars. He started smoking about 49 years ago. He has a 7.00 pack-year smoking history. He has never used smokeless tobacco. He reports current alcohol use of about 3.0 standard drinks of alcohol per week. He reports previous drug use. Drug: Marijuana.      MEDICATIONS & ALLERGIES:     Review of patient's allergies indicates:  No Known Allergies    Current Outpatient Medications on File Prior to Visit   Medication Sig Dispense Refill    ACCU-CHEK SOFTCLIX LANCETS Misc USE EVERY  each 6    allopurinoL (ZYLOPRIM) 100 MG tablet TAKE 1 TABLET BY MOUTH EVERY DAY 30 tablet 10    amiodarone (PACERONE) 200 MG Tab Take 2 tablets (400 mg total) by mouth 2 (two) times daily for 14 days, THEN 1 tablet (200 mg total) once daily. 146 tablet 0    blood sugar diagnostic (ACCU-CHEK  ANTONELLA PLUS TEST STRP) Strp Inject 1 strip into the skin 2 (two) times daily before meals. 100 strip 11    blood-glucose meter kit Checks blood sugars 1x/daily. 1 each 12    fenofibrate 160 MG Tab TAKE 1 TABLET(160 MG) BY MOUTH EVERY DAY (Patient taking differently: Take by mouth every evening.) 30 tablet 10    glimepiride (AMARYL) 2 MG tablet TAKE 2 TABLETS BY MOUTH EVERY MORNING 60 tablet 9    lisinopriL-hydrochlorothiazide (PRINZIDE,ZESTORETIC) 20-25 mg Tab TAKE 1 TABLET BY MOUTH EVERY DAY 90 tablet 1    metFORMIN (GLUCOPHAGE) 500 MG tablet TAKE 2 TABLETS BY MOUTH EVERY MORNING AND 2 TABLETS WITH DINNER 120 tablet 10    metoprolol succinate (TOPROL-XL) 25 MG 24 hr tablet Take 1 tablet (25 mg total) by mouth once daily. 30 tablet 11    nitroGLYCERIN (NITROSTAT) 0.4 MG SL tablet Place 1 tablet (0.4 mg total) under the tongue every 5 (five) minutes as needed for Chest pain. If you need a third tablet, call 911 60 tablet 12    potassium citrate (UROCIT-K) 10 mEq (1,080 mg) TbSR TAKE 1 TABLET BY MOUTH TWICE DAILY 60 tablet 9    rivaroxaban (XARELTO) 20 mg Tab Take 1 tablet (20 mg total) by mouth daily with dinner or evening meal. 30 tablet 11    rosuvastatin (CRESTOR) 20 MG tablet TAKE 1 TABLET(20 MG) BY MOUTH EVERY DAY (Patient taking differently: Take by mouth every evening.) 30 tablet 11    tamsulosin (FLOMAX) 0.4 mg Cap TAKE 1 CAPSULE(0.4 MG) BY MOUTH EVERY DAY 30 capsule 2    testosterone (ANDROGEL) 20.25 mg/1.25 gram (1.62 %) GlPm Apply 4 pumps to shoulders daily 2 each 5    [DISCONTINUED] tamsulosin (FLOMAX) 0.4 mg Cap Take 1 capsule (0.4 mg total) by mouth once daily. 30 capsule 2     No current facility-administered medications on file prior to visit.        OBJECTIVE:     Vital Signs:  BP (!) 131/59   Pulse (!) 59   Ht 6' (1.829 m)   Wt 118.7 kg (261 lb 11 oz)   BMI 35.49 kg/m²     Physical Exam:  Physical Exam  Constitutional:       General: He is not in acute distress.     Appearance:  Normal appearance. He is obese. He is not ill-appearing or diaphoretic.      Comments: Well-appearing elderly man in NAD.   HENT:      Head: Normocephalic and atraumatic.      Comments: Mask worn in clinic in the setting of COVID precautions.   Eyes:      Pupils: Pupils are equal, round, and reactive to light.   Cardiovascular:      Rate and Rhythm: Normal rate and regular rhythm.      Pulses: Normal pulses.      Heart sounds: Murmur heard.     No friction rub. No gallop.      Comments: Irregularly irregular and tachycardia per right radial artery palpation. II/VI ESVIN best appreciated at the RUSB.   Pulmonary:      Effort: Pulmonary effort is normal. No respiratory distress.      Breath sounds: Normal breath sounds. No wheezing, rhonchi or rales.   Chest:      Chest wall: No tenderness.   Abdominal:      General: There is no distension.      Palpations: Abdomen is soft.      Tenderness: There is no abdominal tenderness.   Musculoskeletal:         General: No swelling or tenderness.      Cervical back: Normal range of motion.      Right lower leg: No edema.      Left lower leg: No edema.   Skin:     General: Skin is warm and dry.      Findings: No erythema, lesion or rash.   Neurological:      General: No focal deficit present.      Mental Status: He is alert and oriented to person, place, and time. Mental status is at baseline.      Motor: No weakness.      Gait: Gait normal.   Psychiatric:         Mood and Affect: Mood normal.         Behavior: Behavior normal.        Laboratory Data:  Lab Results   Component Value Date    WBC 5.78 03/22/2022    HGB 13.2 (L) 03/22/2022    HCT 41.6 03/22/2022    MCV 94 03/22/2022     03/22/2022     Lab Results   Component Value Date     (H) 05/02/2022     05/02/2022    K 4.6 05/02/2022     05/02/2022    CO2 28 05/02/2022    BUN 20 05/02/2022    CREATININE 1.5 (H) 05/02/2022    CALCIUM 9.9 05/02/2022    MG 2.0 02/15/2022     Lab Results   Component Value  Date    INR 1.1 04/01/2022    INR 1.1 03/03/2022       Pertinent Cardiac Data:  ECG: Atrial fibrillation with rapid ventricular response, ventricular rate of 100 bpm, QRS 90 ms, QT/QTc 350/451 ms.     Pharmacologic Nuclear Cardiac Stress Test - PET - 9/14/2020:    The relative PET images are normal showing no clinically significant regional resting or stress induced perfusion defects.    Whole heart absolute myocardial perfusion (cc/min/g) averaged 0.59 cc/min/g at rest, which is reduced, 1.73 cc/min/g at stress, which is mildly reduced, and 2.94  CFR, which is normal.    There is mild thinning of the mid and distal inferior wall with preserved flow capacity.  These findings are consistent with non-obstructive RCA disease or possible RCA  with excellent collaterals.    Gated perfusion images showed an ejection fraction of 57% at rest and 79% during stress. Normal ejection fraction is greater than 51%.    Wall motion was normal at rest and during stress.    LV cavity size is normal at rest and stress.    The EKG portion of this study is negative for ischemia.    Arrhythmias during stress: occasional PVCs.    The patient reported no chest pain during the stress test.    There are no prior studies for comparison.    Coronary Angiogram - 9/30/2020:  · LVEDP (Pre): 10  · 50% PDA stenosis iFR=0.98  · Estimated blood loss: <50 mL    Resting 2D Transthoracic Echocardiogram - 3/18/2022:  · Heart rate varied b/w 94 and 132 bpm.  · The left ventricle is normal in size with concentric remodeling and mildly decreased systolic function.  · The estimated ejection fraction is 45%.  · A diastolic pattern consistent with atrial fibrillation observed.  · Normal right ventricular size with normal right ventricular systolic function.  · Aortic sclerosis w/o stenosis. Aortic valve area is 2.39 cm2; peak velocity is 1.97 m/s; mean gradient is 9 mmHg.  · The estimated PA systolic pressure is 23 mmHg.  · Normal central venous  pressure (3 mmHg).      ASSESSMENT & PLAN:   Mr. Lukasz Person is a randa 67-year-old gentleman who presents today for evaluation and recommendations regarding paroxysmal atrial fibrillation. He has a past medical history significant for a new diagnosis of paroxysmal atrial fibrillation, mildly-reduced systolic function with LVEF 45%, non-obstructive coronary artery disease, hypertension, hyperlipidemia, type II diabetes mellitus, CKD stage III, aortic sclerosis without stenosis, obesity, obstructive sleep apnea, hyperparathyroidism s/p parathyroidectomy, a history of pyelonephritis with lithotripsy, and a history of tobacco use. He underwent recent TTE/DCCV and has been in sinus since cardioversion and on amiodarone.       - We discussed the pathophysiology of atrial fibrillation; specifically, we discussed paroxysmal atrial fibrillation and the concept that some patients may experience paroxysms of atrial fibrillation interrupting periods of sinus rhythm. We discussed that even a relatively low burden of atrial fibrillation continues to have an increased risk of CVA. Lastly, we discussed that in some cases atrial fibrillation may be triggered secondary to an underlying acute illness or infection. Possible underlying drivers of atrial fibrillation were addressed at this appointment, including recommendations for weight loss - now a class I recommendation. Review of laboratory data reveals acceptable TSH of 1.677. We discussed that his prior pyelonephritis may have been driving his atrial fibrillation, but that many patients may have atrial fibrillation independently and he may need to continue medications for management of his AF. We discussed the role that obstructive sleep apnea may play in atrial fibrillation, with recommendations for continued use of his CPAP machine.    - His [id number] is 4 (CHF, HTN, T2DM, male gender, age 65-74), portending an annual adjusted risk of CVA of 4%. We discussed the relative  increased risk of CVA around the time of cardioversion and the need to stay on oral anticoagulation preceding and following DCCV. He remains on uninterrupted rivaroxaban 20mg po daily with dinner.      - He should continue rest of his cardiac medications for the reduced LVEF - lisinopril and metoprolol.     - Will continue to monitor on antiarrythmic to rivaroxaban and will follow in 3 months. During that appointment will discuss whether to continue on amiodarone vs. Switch to another agent of discontinue antiarrythmic altogether. This was all explained to him and he expressed understanding.         Signing Physician:       Electrophysiology Attending

## 2022-05-23 ENCOUNTER — TELEPHONE (OUTPATIENT)
Dept: UROLOGY | Facility: CLINIC | Age: 67
End: 2022-05-23
Payer: MEDICARE

## 2022-05-23 NOTE — TELEPHONE ENCOUNTER
I spoke with patient, who stated he is in [place] and needs his appt to be rescheduled to next week with . patient agreed to 5-31-22.    ----- Message from Gill Penny sent at 5/23/2022 11:06 AM CDT -----  Regarding: pt  Pt is calling to speak with the nurse to reschedule his post op appt for tomorrow pt is out of town can you please call pt at [phone].    MARIELY

## 2022-05-26 ENCOUNTER — HOSPITAL ENCOUNTER (OUTPATIENT)
Dept: CARDIOLOGY | Facility: HOSPITAL | Age: 67
Discharge: HOME | End: 2022-05-26
Attending: NURSE PRACTITIONER
Payer: MEDICARE

## 2022-05-26 VITALS
WEIGHT: 261 LBS | DIASTOLIC BLOOD PRESSURE: 60 MMHG | HEART RATE: 42 BPM | SYSTOLIC BLOOD PRESSURE: 140 MMHG | BODY MASS INDEX: 35.35 KG/M2 | HEIGHT: 72 IN

## 2022-05-26 DIAGNOSIS — I42.9 CARDIOMYOPATHY, UNSPECIFIED TYPE: ICD-10-CM

## 2022-05-26 LAB
ASCENDING AORTA: 3.17 CM
AV INDEX (PROSTH): 0.47
AV MEAN GRADIENT: 10 MMHG
AV PEAK GRADIENT: 17 MMHG
AV VALVE AREA: 1.69 CM2
AV VELOCITY RATIO: 0.47
BSA FOR ECHO PROCEDURE: 2.45 M2
CV ECHO LV RWT: 0.36 CM
DOP CALC AO PEAK VEL: 2.07 M/S
DOP CALC AO VTI: 50.73 CM
DOP CALC LVOT AREA: 3.6 CM2
DOP CALC LVOT DIAMETER: 2.14 CM
DOP CALC LVOT PEAK VEL: 0.98 M/S
DOP CALC LVOT STROKE VOLUME: 85.6 CM3
DOP CALC RVOT PEAK VEL: 0.79 M/S
DOP CALC RVOT VTI: 20.77 CM
DOP CALCLVOT PEAK VEL VTI: 23.81 CM
E WAVE DECELERATION TIME: 259.07 MSEC
E/A RATIO: 1.73
E/E' RATIO: 8.5 M/S
ECHO LV POSTERIOR WALL: 0.94 CM (ref 0.6–1.1)
EJECTION FRACTION: 60 %
FRACTIONAL SHORTENING: 35 % (ref 28–44)
INTERVENTRICULAR SEPTUM: 0.92 CM (ref 0.6–1.1)
IVRT: 87.54 MSEC
LA MAJOR: 5.29 CM
LA MINOR: 5.44 CM
LA WIDTH: 4.34 CM
LEFT ATRIUM SIZE: 4.89 CM
LEFT ATRIUM VOLUME INDEX MOD: 22.7 ML/M2
LEFT ATRIUM VOLUME INDEX: 40.5 ML/M2
LEFT ATRIUM VOLUME MOD: 54.25 CM3
LEFT ATRIUM VOLUME: 96.76 CM3
LEFT INTERNAL DIMENSION IN SYSTOLE: 3.38 CM (ref 2.1–4)
LEFT VENTRICLE DIASTOLIC VOLUME INDEX: 55.15 ML/M2
LEFT VENTRICLE DIASTOLIC VOLUME: 131.81 ML
LEFT VENTRICLE MASS INDEX: 75 G/M2
LEFT VENTRICLE SYSTOLIC VOLUME INDEX: 19.6 ML/M2
LEFT VENTRICLE SYSTOLIC VOLUME: 46.84 ML
LEFT VENTRICULAR INTERNAL DIMENSION IN DIASTOLE: 5.24 CM (ref 3.5–6)
LEFT VENTRICULAR MASS: 178.68 G
LV LATERAL E/E' RATIO: 7.73 M/S
LV SEPTAL E/E' RATIO: 9.44 M/S
MV A" WAVE DURATION": 15.6 MSEC
MV PEAK A VEL: 0.49 M/S
MV PEAK E VEL: 0.85 M/S
MV STENOSIS PRESSURE HALF TIME: 49.23 MS
MV VALVE AREA P 1/2 METHOD: 4.47 CM2
PISA TR MAX VEL: 2.65 M/S
PULM VEIN S/D RATIO: 1
PV MEAN GRADIENT: 1.5 MMHG
PV PEAK D VEL: 0.74 M/S
PV PEAK S VEL: 0.74 M/S
PV PEAK VELOCITY: 1.16 CM/S
RA MAJOR: 5.44 CM
RA PRESSURE: 3 MMHG
RA WIDTH: 3.31 CM
RIGHT VENTRICULAR END-DIASTOLIC DIMENSION: 2.87 CM
SINUS: 2.84 CM
STJ: 2.52 CM
TDI LATERAL: 0.11 M/S
TDI SEPTAL: 0.09 M/S
TDI: 0.1 M/S
TR MAX PG: 28 MMHG
TRICUSPID ANNULAR PLANE SYSTOLIC EXCURSION: 2.63 CM
TV REST PULMONARY ARTERY PRESSURE: 31 MMHG

## 2022-05-26 PROCEDURE — 93306 TTE W/DOPPLER COMPLETE: CPT

## 2022-05-26 PROCEDURE — 93306 TTE W/DOPPLER COMPLETE: CPT | Mod: 26,,, | Performed by: INTERNAL MEDICINE

## 2022-05-26 PROCEDURE — 93306 ECHO (CUPID ONLY): ICD-10-PCS | Mod: 26,,, | Performed by: INTERNAL MEDICINE

## 2022-05-30 ENCOUNTER — OFFICE VISIT (OUTPATIENT)
Dept: CARDIOLOGY | Facility: CLINIC | Age: 67
End: 2022-05-30
Payer: MEDICARE

## 2022-05-30 VITALS
DIASTOLIC BLOOD PRESSURE: 61 MMHG | BODY MASS INDEX: 35.29 KG/M2 | HEART RATE: 55 BPM | HEIGHT: 72 IN | OXYGEN SATURATION: 95 % | SYSTOLIC BLOOD PRESSURE: 139 MMHG | WEIGHT: 260.56 LBS

## 2022-05-30 DIAGNOSIS — I48.0 PAROXYSMAL ATRIAL FIBRILLATION: ICD-10-CM

## 2022-05-30 DIAGNOSIS — I25.10 CORONARY ARTERY DISEASE INVOLVING NATIVE CORONARY ARTERY OF NATIVE HEART WITHOUT ANGINA PECTORIS: ICD-10-CM

## 2022-05-30 DIAGNOSIS — E11.59 HYPERTENSION ASSOCIATED WITH DIABETES: Chronic | ICD-10-CM

## 2022-05-30 DIAGNOSIS — G47.33 OSA ON CPAP: Chronic | ICD-10-CM

## 2022-05-30 DIAGNOSIS — E66.01 SEVERE OBESITY (BMI 35.0-35.9 WITH COMORBIDITY): ICD-10-CM

## 2022-05-30 DIAGNOSIS — Z79.899 LONG TERM CURRENT USE OF AMIODARONE: ICD-10-CM

## 2022-05-30 DIAGNOSIS — Z79.01 CHRONIC ANTICOAGULATION: ICD-10-CM

## 2022-05-30 DIAGNOSIS — E11.69 HYPERLIPIDEMIA ASSOCIATED WITH TYPE 2 DIABETES MELLITUS: Chronic | ICD-10-CM

## 2022-05-30 DIAGNOSIS — I15.2 HYPERTENSION ASSOCIATED WITH DIABETES: Chronic | ICD-10-CM

## 2022-05-30 DIAGNOSIS — I42.8 NON-ISCHEMIC CARDIOMYOPATHY: Primary | ICD-10-CM

## 2022-05-30 DIAGNOSIS — E78.5 HYPERLIPIDEMIA ASSOCIATED WITH TYPE 2 DIABETES MELLITUS: Chronic | ICD-10-CM

## 2022-05-30 PROCEDURE — 99215 OFFICE O/P EST HI 40 MIN: CPT | Mod: PBBFAC | Performed by: NURSE PRACTITIONER

## 2022-05-30 PROCEDURE — 99999 PR PBB SHADOW E&M-EST. PATIENT-LVL V: CPT | Mod: PBBFAC,,, | Performed by: NURSE PRACTITIONER

## 2022-05-30 PROCEDURE — 99214 OFFICE O/P EST MOD 30 MIN: CPT | Mod: S$PBB,,, | Performed by: NURSE PRACTITIONER

## 2022-05-30 PROCEDURE — 99999 PR PBB SHADOW E&M-EST. PATIENT-LVL V: ICD-10-PCS | Mod: PBBFAC,,, | Performed by: NURSE PRACTITIONER

## 2022-05-30 PROCEDURE — 99214 PR OFFICE/OUTPT VISIT, EST, LEVL IV, 30-39 MIN: ICD-10-PCS | Mod: S$PBB,,, | Performed by: NURSE PRACTITIONER

## 2022-05-30 NOTE — PROGRESS NOTES
Mr. Person is a patient of Dr. Nick and was last seen in [facility] Cardiology Visit 4/13/22.      Subjective:   Patient ID:  Lukasz Person is a 67 y.o. male who presents for follow-up of Establish Care and Follow-up    Problem List:  Non-obstructive CAD  Diabetes mellitus since his late 50s/early 60  Hypertension since his late 50s/early 60s  Mixed hyperlipidemia  Paroxysmal atrial fibrillation in setting of pyelonephritis  KUMAR - 2006  Lithotripsy  Parathyroidectomy 2017  7 pack year h/o smoking, quit in 1972    HPI:   Lukasz Person (said Mar) is in clinic today for routine follow up.  His EF fully recovered after restoration of SR.  Patient denies palpitations, SOB, CUELLAR, dizziness, syncope, edema, orthopnea, PND, or claudication.  Reports walking and yard work.  Patient has history of obstructive sleep apnea.  Reports nightly use of CPAP machine and denies any associated sx of KUMAR.  He gets exertional chest tightness that improves after about 10 minutes of walking.  He was able to mow his grass this morning.     Review of Systems   Constitutional: Negative for decreased appetite, diaphoresis, malaise/fatigue, weight gain and weight loss.   Eyes: Negative for visual disturbance.   Cardiovascular: Negative for chest pain, claudication, dyspnea on exertion, irregular heartbeat, leg swelling, near-syncope, orthopnea, palpitations, paroxysmal nocturnal dyspnea and syncope.        Denies chest pressure   Respiratory: Negative for cough, hemoptysis, shortness of breath, sleep disturbances due to breathing and snoring.    Endocrine: Negative for cold intolerance and heat intolerance.   Hematologic/Lymphatic: Negative for bleeding problem. Does not bruise/bleed easily.   Musculoskeletal: Negative for myalgias.   Gastrointestinal: Negative for bloating, abdominal pain, anorexia, change in bowel habit, constipation, diarrhea, nausea and vomiting.   Neurological: Negative for difficulty with concentration, disturbances in  coordination, excessive daytime sleepiness, dizziness, headaches, light-headedness, loss of balance, numbness and weakness.   Psychiatric/Behavioral: The patient does not have insomnia.        Allergies and current medications updated and reviewed:  Review of patient's allergies indicates:  No Known Allergies  Current Outpatient Medications   Medication Sig    ACCU-CHEK SOFTCLIX LANCETS Misc USE EVERY DAY    allopurinoL (ZYLOPRIM) 100 MG tablet TAKE 1 TABLET BY MOUTH EVERY DAY    amiodarone (PACERONE) 200 MG Tab Take 2 tablets (400 mg total) by mouth 2 (two) times daily for 14 days, THEN 1 tablet (200 mg total) once daily.    blood sugar diagnostic (ACCU-CHEK ANTONELLA PLUS TEST STRP) Strp Inject 1 strip into the skin 2 (two) times daily before meals.    blood-glucose meter kit Checks blood sugars 1x/daily.    fenofibrate 160 MG Tab TAKE 1 TABLET(160 MG) BY MOUTH EVERY DAY (Patient taking differently: Take by mouth every evening.)    glimepiride (AMARYL) 2 MG tablet TAKE 2 TABLETS BY MOUTH EVERY MORNING    lisinopriL-hydrochlorothiazide (PRINZIDE,ZESTORETIC) 20-25 mg Tab TAKE 1 TABLET BY MOUTH EVERY DAY    metFORMIN (GLUCOPHAGE) 500 MG tablet TAKE 2 TABLETS BY MOUTH EVERY MORNING AND 2 TABLETS WITH DINNER    metoprolol succinate (TOPROL-XL) 25 MG 24 hr tablet Take 1 tablet (25 mg total) by mouth once daily.    nitroGLYCERIN (NITROSTAT) 0.4 MG SL tablet Place 1 tablet (0.4 mg total) under the tongue every 5 (five) minutes as needed for Chest pain. If you need a third tablet, call 911    potassium citrate (UROCIT-K) 10 mEq (1,080 mg) TbSR TAKE 1 TABLET BY MOUTH TWICE DAILY    rivaroxaban (XARELTO) 20 mg Tab Take 1 tablet (20 mg total) by mouth daily with dinner or evening meal.    rosuvastatin (CRESTOR) 20 MG tablet TAKE 1 TABLET(20 MG) BY MOUTH EVERY DAY (Patient taking differently: Take by mouth every evening.)    tamsulosin (FLOMAX) 0.4 mg Cap TAKE 1 CAPSULE(0.4 MG) BY MOUTH EVERY DAY    testosterone  (ANDROGEL) 20.25 mg/1.25 gram (1.62 %) GlPm Apply 4 pumps to shoulders daily     No current facility-administered medications for this visit.       Objective:     Right Arm BP - Sittin/60 (22 1018)  Left Arm BP - Sittin/61 (22 1018)    /61 (BP Location: Left arm, Patient Position: Sitting, BP Method: Large (Automatic))   Pulse (!) 55   Ht 6' (1.829 m)   Wt 118.2 kg (260 lb 9.3 oz)   SpO2 95%   BMI 35.34 kg/m²       Physical Exam  Vitals and nursing note reviewed.   Constitutional:       General: He is not in acute distress.     Appearance: Normal appearance. He is well-developed. He is not diaphoretic.   HENT:      Head: Normocephalic and atraumatic.   Eyes:      General: Lids are normal. No scleral icterus.     Conjunctiva/sclera: Conjunctivae normal.   Neck:      Vascular: Normal carotid pulses. No carotid bruit, hepatojugular reflux or JVD.   Cardiovascular:      Rate and Rhythm: Normal rate and regular rhythm.      Chest Wall: PMI is not displaced.      Pulses: Intact distal pulses.           Carotid pulses are 2+ on the right side and 2+ on the left side.       Radial pulses are 2+ on the right side and 2+ on the left side.        Dorsalis pedis pulses are 2+ on the right side and 2+ on the left side.        Posterior tibial pulses are 1+ on the right side and 1+ on the left side.      Heart sounds: S1 normal and S2 normal. No murmur heard.    No friction rub. No gallop.   Pulmonary:      Effort: Pulmonary effort is normal. No respiratory distress.      Breath sounds: Normal breath sounds. No decreased breath sounds, wheezing, rhonchi or rales.   Chest:      Chest wall: No tenderness.   Abdominal:      General: Bowel sounds are normal. There is no distension or abdominal bruit.      Palpations: Abdomen is soft. There is no fluid wave or pulsatile mass.      Tenderness: There is no abdominal tenderness.   Musculoskeletal:      Cervical back: Neck supple.   Skin:     General:  Skin is warm and dry.      Findings: No rash.      Nails: There is no clubbing.   Neurological:      Mental Status: He is alert and oriented to person, place, and time.      Gait: Gait normal.   Psychiatric:         Speech: Speech normal.         Behavior: Behavior normal.         Thought Content: Thought content normal.         Judgment: Judgment normal.         Chemistry        Component Value Date/Time     05/02/2022 0730    K 4.6 05/02/2022 0730     05/02/2022 0730    CO2 28 05/02/2022 0730    BUN 20 05/02/2022 0730    CREATININE 1.5 (H) 05/02/2022 0730     (H) 05/02/2022 0730        Component Value Date/Time    CALCIUM 9.9 05/02/2022 0730    ALKPHOS 41 (L) 05/02/2022 0730    AST 21 05/02/2022 0730    ALT 17 05/02/2022 0730    BILITOT 0.4 05/02/2022 0730    ESTGFRAFRICA 54.5 (A) 05/02/2022 0730    EGFRNONAA 47.2 (A) 05/02/2022 0730        Lab Results   Component Value Date    HGBA1C 6.8 (H) 05/02/2022     Recent Labs   Lab 03/22/22  0850   WBC 5.78   Hemoglobin 13.2 L   Hematocrit 41.6   MCV 94   Platelets 251   TSH 1.677   Cholesterol 94 L   HDL 42   LDL Cholesterol 30.2 L   Triglycerides 109   HDL/Cholesterol Ratio 44.7       Recent Labs   Lab 03/03/22  1851 04/01/22  0918   INR 1.1 1.1        Test(s) Reviewed  I have reviewed the following in detail:  [] Stress test   [] Angiography   [x] Echocardiogram   [x] Labs   [] Other:         Assessment/Plan:   1. Non-ischemic cardiomyopathy  Well compensated.  Recovered EF after restoration of SR.  No changes.    2. Hypertension associated with diabetes  BP at goal <130/80. Continue current regimen.      Lab Results   Component Value Date    HGBA1C 6.8 (H) 05/02/2022     A1C at goal <7. F/U with PCP as planned      3. Coronary artery disease involving native coronary artery of native heart without angina pectoris  Asymptomatic. Stable. Monitor     4. Paroxysmal atrial fibrillation  Stable. Monitor.     5. Chronic anticoagulation  Denies bleeding.   Discussed when to seek immediate medical attention.       6. Hyperlipidemia associated with type 2 diabetes mellitus  LDL at goal <70. No changes      7. KUMAR on CPAP  Using cpap. Encouraged nightly use of cpap and annual f/u with sleep clinic.      8. Severe obesity (BMI 35.0-35.9 with comorbidity)  BMI 35.3 Encouraged CV exercise for 30 minutes a day for 5 days a week.     9. Long term amiodarone  Patient taking amiodarone.  Last TSH 3/22/22, LFTs 5/2/22, eye exam (encouraged annual) and CXR 3/3/22.       A copy of this note will be forwarded to Dr. Nick and Dr. Wilson     Follow up in about 6 months (around 11/30/2022).

## 2022-05-31 ENCOUNTER — OFFICE VISIT (OUTPATIENT)
Dept: UROLOGY | Facility: CLINIC | Age: 67
End: 2022-05-31
Payer: MEDICARE

## 2022-05-31 ENCOUNTER — TELEPHONE (OUTPATIENT)
Dept: NEPHROLOGY | Facility: CLINIC | Age: 67
End: 2022-05-31
Payer: MEDICARE

## 2022-05-31 VITALS — SYSTOLIC BLOOD PRESSURE: 148 MMHG | HEART RATE: 58 BPM | DIASTOLIC BLOOD PRESSURE: 65 MMHG

## 2022-05-31 DIAGNOSIS — N20.0 NEPHROLITHIASIS: Primary | ICD-10-CM

## 2022-05-31 PROCEDURE — 99213 OFFICE O/P EST LOW 20 MIN: CPT | Mod: PBBFAC | Performed by: UROLOGY

## 2022-05-31 PROCEDURE — 99999 PR PBB SHADOW E&M-EST. PATIENT-LVL III: ICD-10-PCS | Mod: PBBFAC,,, | Performed by: UROLOGY

## 2022-05-31 PROCEDURE — 99213 PR OFFICE/OUTPT VISIT, EST, LEVL III, 20-29 MIN: ICD-10-PCS | Mod: S$PBB,,, | Performed by: UROLOGY

## 2022-05-31 PROCEDURE — 99999 PR PBB SHADOW E&M-EST. PATIENT-LVL III: CPT | Mod: PBBFAC,,, | Performed by: UROLOGY

## 2022-05-31 PROCEDURE — 99213 OFFICE O/P EST LOW 20 MIN: CPT | Mod: S$PBB,,, | Performed by: UROLOGY

## 2022-05-31 NOTE — PROGRESS NOTES
Urology - [facility]  Clinic Note    SUBJECTIVE:     Chief Complaint: kidney stone    History of Present Illness:  Lukasz Person is a 67 y.o. male who presents to clinic for kidney stone. He is established to our clinic. Referral from No ref. provider found     He has a history of kidney stones and had left URS and stent placement on 2/25/22. 3 month f/u KUB does not show stones, however, US shows a small right stone, but no bilateral hydronephrosis. This stone was previously seen on CT and does not appear to have grown in size.    Currently he does not have dysuria or hematuria. Denies flank or abdominal pain    OBJECTIVE:     Estimated body mass index is 35.34 kg/m² as calculated from the following:    Height as of 5/30/22: 6' (1.829 m).    Weight as of 5/30/22: 118.2 kg (260 lb 9.3 oz).    Vital Signs (Most Recent)  Pulse: (!) 58 (05/31/22 1520)  BP: (!) 148/65 (05/31/22 1520)    Physical Exam  Vitals and nursing note reviewed.   Constitutional:       General: He is not in acute distress.     Appearance: Normal appearance. He is well-developed. He is not ill-appearing or toxic-appearing.   Pulmonary:      Effort: Pulmonary effort is normal. No accessory muscle usage or respiratory distress.   Abdominal:      General: There is no distension.      Palpations: Abdomen is soft.      Tenderness: There is no abdominal tenderness. There is no right CVA tenderness or left CVA tenderness.   Neurological:      General: No focal deficit present.      Mental Status: He is alert and oriented to person, place, and time. Mental status is at baseline.   Psychiatric:         Thought Content: Thought content normal.         Judgment: Judgment normal.         Lab Results   Component Value Date    BUN 20 05/02/2022    CREATININE 1.5 (H) 05/02/2022    WBC 5.78 03/22/2022    HGB 13.2 (L) 03/22/2022    HCT 41.6 03/22/2022     03/22/2022    AST 21 05/02/2022    ALT 17 05/02/2022    ALKPHOS 41 (L) 05/02/2022    ALBUMIN 4.0  05/02/2022    HGBA1C 6.8 (H) 05/02/2022        Lab Results   Component Value Date    PSA 1.2 10/06/2021    PSA 0.60 10/17/2020    PSADIAG 1.9 03/22/2022       Imaging:  KUB does not show kidney stones  Renal US shows a small right stone, but no hydronephrosis bilaterally.    ASSESSMENT     1. Nephrolithiasis      PLAN:   Diagnoses and all orders for this visit:    Nephrolithiasis  -     X-Ray Abdomen AP 1 View; Future  -     US Retroperitoneal Complete; Future    The patient would prefer to watch and wait with repeat US and KUB in 6 months. He would prefer ESWL if he does decide on treatment, however, stone currently not seen on KUB     General risk factors for kidney stones and the conservative measures to prevent kidney stones in the future were discussed with the patient in detail.  The patient was encouraged to drink 2-3 liters of water a day, limit iced tea and pasquale as well as foods high in oxalate.  They were cautioned to try to limit salt and red meat intake.  We also discussed adding citrate to the diet with the addition of atul or lemon juice to their water or alternatively with crystal light.     Anshu Soler MD

## 2022-06-02 ENCOUNTER — HOSPITAL ENCOUNTER (OUTPATIENT)
Dept: CARDIOLOGY | Facility: HOSPITAL | Age: 67
Discharge: HOME | End: 2022-06-02
Attending: NURSE PRACTITIONER
Payer: MEDICARE

## 2022-06-02 DIAGNOSIS — Z13.6 SCREENING FOR AAA (ABDOMINAL AORTIC ANEURYSM): ICD-10-CM

## 2022-06-02 LAB
ABDOMINAL IMA AP: 2.1 CM
ABDOMINAL IMA ED VEL: 0 CM/S
ABDOMINAL IMA PS VEL: 150 CM/S
ABDOMINAL IMA TRANS: 1.85 CM
ABDOMINAL INFRARENAL AORTA AP: 1.97 CM
ABDOMINAL INFRARENAL AORTA ED VEL: 0 CM/S
ABDOMINAL INFRARENAL AORTA PS VEL: 145 CM/S
ABDOMINAL INFRARENAL AORTA TRANS: 1.94 CM
ABDOMINAL JUXTARENAL AORTA AP: 1.9 CM
ABDOMINAL JUXTARENAL AORTA ED VEL: 0 CM/S
ABDOMINAL JUXTARENAL AORTA PS VEL: 107 CM/S
ABDOMINAL JUXTARENAL AORTA TRANS: 1.9 CM
ABDOMINAL LT COM ILIAC AP: 1.16 CM
ABDOMINAL LT COM ILIAC TRANS: 1.3 CM
ABDOMINAL LT COM ILIAC VEL: 153 CM/S
ABDOMINAL LT COM ILLIAC ED VEL: 0 CM/S
ABDOMINAL RT COM ILIAC AP: 1.3 CM
ABDOMINAL RT COM ILIAC TRANS: 1.3 CM
ABDOMINAL RT COM ILIAC VEL: 131 CM/S
ABDOMINAL RT COM ILLIAC ED VEL: 0 CM/S
ABDOMINAL SUPRARENAL AORTA AP: 2.48 CM
ABDOMINAL SUPRARENAL AORTA ED VEL: 19 CM/S
ABDOMINAL SUPRARENAL AORTA PS VEL: 142 CM/S
ABDOMINAL SUPRARENAL AORTA TRANS: 2.07 CM

## 2022-06-02 PROCEDURE — 93978 VASCULAR STUDY: CPT

## 2022-06-02 PROCEDURE — 93978 CV US ABDOMINAL AORTA EVALUATION (CUPID ONLY): ICD-10-PCS | Mod: 26,,, | Performed by: INTERNAL MEDICINE

## 2022-06-02 PROCEDURE — 93978 VASCULAR STUDY: CPT | Mod: 26,,, | Performed by: INTERNAL MEDICINE

## 2022-06-03 ENCOUNTER — TELEPHONE (OUTPATIENT)
Dept: PHARMACY | Facility: CLINIC | Age: 67
End: 2022-06-03
Payer: MEDICARE

## 2022-06-04 ENCOUNTER — TELEPHONE ENCOUNTER (OUTPATIENT)
Dept: URBAN - METROPOLITAN AREA CLINIC 68 | Facility: CLINIC | Age: 67
End: 2022-06-04

## 2022-06-05 ENCOUNTER — TELEPHONE ENCOUNTER (OUTPATIENT)
Dept: URBAN - METROPOLITAN AREA CLINIC 68 | Facility: CLINIC | Age: 67
End: 2022-06-05

## 2022-06-05 RX ORDER — GLIMEPIRIDE 2 MG/1
GLIMEPIRIDE( 2MG ORAL 1 DAILY ) ACTIVE -HX ENTRY TABLET ORAL DAILY
Status: ACTIVE | COMMUNITY
Start: 2017-05-23

## 2022-06-05 RX ORDER — FENOFIBRATE 160 MG/1
FENOFIBRATE( 160MG ORAL 1 DAILY ) ACTIVE -HX ENTRY TABLET ORAL DAILY
Status: ACTIVE | COMMUNITY
Start: 2017-05-23

## 2022-06-05 RX ORDER — LISINOPRIL AND HYDROCHLOROTHIAZIDE TABLETS 20; 25 MG/1; MG/1
LISINOPRIL-HYDROCHLOROTHIAZIDE( 20-25MG ORAL 1 DAILY ) ACTIVE -HX ENTRY TABLET ORAL DAILY
Status: ACTIVE | COMMUNITY
Start: 2017-05-23

## 2022-06-05 RX ORDER — METFORMIN HYDROCHLORIDE 500 MG/1
METFORMIN HCL( 500MG ORAL 2 BID ) ACTIVE -HX ENTRY TABLET, COATED ORAL BID
Status: ACTIVE | COMMUNITY
Start: 2017-05-23

## 2022-06-05 RX ORDER — POTASSIUM CITRATE 10 MEQ/1
POTASSIUM CITRATE ER( 10 MEQ(1080 MG) ORAL 1 BID ) ACTIVE -HX ENTRY TABLET, EXTENDED RELEASE ORAL BID
Status: ACTIVE | COMMUNITY
Start: 2017-05-23

## 2022-06-07 ENCOUNTER — OFFICE VISIT (OUTPATIENT)
Dept: PODIATRY | Facility: CLINIC | Age: 67
End: 2022-06-07
Payer: MEDICARE

## 2022-06-07 ENCOUNTER — TELEPHONE (OUTPATIENT)
Dept: CARDIOLOGY | Facility: CLINIC | Age: 67
End: 2022-06-07
Payer: MEDICARE

## 2022-06-07 ENCOUNTER — PATIENT MESSAGE (OUTPATIENT)
Dept: CARDIOLOGY | Facility: CLINIC | Age: 67
End: 2022-06-07
Payer: MEDICARE

## 2022-06-07 VITALS
BODY MASS INDEX: 35.29 KG/M2 | DIASTOLIC BLOOD PRESSURE: 61 MMHG | SYSTOLIC BLOOD PRESSURE: 162 MMHG | WEIGHT: 260.56 LBS | HEIGHT: 72 IN | HEART RATE: 51 BPM

## 2022-06-07 DIAGNOSIS — E11.8 DIABETIC FEET: ICD-10-CM

## 2022-06-07 DIAGNOSIS — E11.22 TYPE 2 DIABETES MELLITUS WITH STAGE 3A CHRONIC KIDNEY DISEASE, WITHOUT LONG-TERM CURRENT USE OF INSULIN: Primary | ICD-10-CM

## 2022-06-07 DIAGNOSIS — N18.31 TYPE 2 DIABETES MELLITUS WITH STAGE 3A CHRONIC KIDNEY DISEASE, WITHOUT LONG-TERM CURRENT USE OF INSULIN: Primary | ICD-10-CM

## 2022-06-07 DIAGNOSIS — Z01.00 ENCOUNTER FOR EYE EXAM IN PATIENT WITH TYPE 2 DIABETES MELLITUS: ICD-10-CM

## 2022-06-07 DIAGNOSIS — E11.9 ENCOUNTER FOR EYE EXAM IN PATIENT WITH TYPE 2 DIABETES MELLITUS: ICD-10-CM

## 2022-06-07 PROCEDURE — 99213 OFFICE O/P EST LOW 20 MIN: CPT | Mod: S$PBB,,, | Performed by: PODIATRIST

## 2022-06-07 PROCEDURE — 99999 PR PBB SHADOW E&M-EST. PATIENT-LVL IV: ICD-10-PCS | Mod: PBBFAC,,, | Performed by: PODIATRIST

## 2022-06-07 PROCEDURE — 99999 PR PBB SHADOW E&M-EST. PATIENT-LVL IV: CPT | Mod: PBBFAC,,, | Performed by: PODIATRIST

## 2022-06-07 PROCEDURE — 99213 PR OFFICE/OUTPT VISIT, EST, LEVL III, 20-29 MIN: ICD-10-PCS | Mod: S$PBB,,, | Performed by: PODIATRIST

## 2022-06-07 PROCEDURE — 99214 OFFICE O/P EST MOD 30 MIN: CPT | Mod: PBBFAC,PN | Performed by: PODIATRIST

## 2022-06-07 NOTE — TELEPHONE ENCOUNTER
----- Message from Inna Capone MA sent at 6/7/2022  9:26 AM CDT -----  The patient would like to talk to you about his medication Amiodarone 200 mg  and his visit with Idania on 5-  please call [phone]. Thank you.

## 2022-06-07 NOTE — TELEPHONE ENCOUNTER
Pt called due to running out of amiodarone before refill date authorized by insurance. He has been mistakenly taking 400mg qd instead of 200mg. Pt updated on correct dose. Called pharmacy who had already communicated w. Ms Yoannans on the issue and found a coupon for $19.x to cover patient until insurance refill date. Pt updated and verbalized understanding.

## 2022-06-07 NOTE — PROGRESS NOTES
Subjective:      Patient ID: Lukasz Person is a 67 y.o. male.    Chief Complaint:   Diabetic Foot Exam (Pcp-Steve 12/21/2021)    Lukasz is a 67 y.o. male who presents to the clinic upon referral from Dr. Malcolm  for evaluation and treatment of diabetic feet. Lukasz has a past medical history of Anticoagulant long-term use, Diabetes mellitus, Hyperlipidemia, Hypertension, Kidney stones, and Sleep apnea.     Pt did see Dr. Melendez for foot pain/peroneal tendon tear. No surgery was needed. Not giving him any trouble.     Pt relates overall doing well. He does have lotion at home. Not using it.       PCP: Kirk Wilson MD    Date Last Seen by PCP: 12/21/21    Current shoe gear: Tennis shoes    Hemoglobin A1C   Date Value Ref Range Status   05/02/2022 6.8 (H) 4.0 - 5.6 % Final     Comment:     ADA Screening Guidelines:  5.7-6.4%  Consistent with prediabetes  >or=6.5%  Consistent with diabetes    High levels of fetal hemoglobin interfere with the HbA1C  assay. Heterozygous hemoglobin variants (HbS, HgC, etc)do  not significantly interfere with this assay.   However, presence of multiple variants may affect accuracy.     02/17/2022 7.2 (H) 4.0 - 5.6 % Final     Comment:     ADA Screening Guidelines:  5.7-6.4%  Consistent with prediabetes  >or=6.5%  Consistent with diabetes    High levels of fetal hemoglobin interfere with the HbA1C  assay. Heterozygous hemoglobin variants (HbS, HgC, etc)do  not significantly interfere with this assay.   However, presence of multiple variants may affect accuracy.     10/06/2021 7.0 (H) 4.0 - 5.6 % Final     Comment:     ADA Screening Guidelines:  5.7-6.4%  Consistent with prediabetes  >or=6.5%  Consistent with diabetes    High levels of fetal hemoglobin interfere with the HbA1C  assay. Heterozygous hemoglobin variants (HbS, HgC, etc)do  not significantly interfere with this assay.   However, presence of multiple variants may affect accuracy.              Past Medical History:   Diagnosis Date     Anticoagulant long-term use     Diabetes mellitus     Onset late 50s/early 60s    Hyperlipidemia     Hypertension     Onset late 50s/early 60s    Kidney stones     Sleep apnea     since 2006     Past Surgical History:   Procedure Laterality Date    CORONARY ANGIOGRAPHY N/A 9/30/2020    Procedure: ANGIOGRAM, CORONARY ARTERY;  Surgeon: John West MD;  Location: [facility] CATH LAB;  Service: Cardiology;  Laterality: N/A;    CYSTOSCOPY N/A 2/17/2022    Procedure: CYSTOSCOPY;  Surgeon: Lukasz Hughes MD;  Location: [facility] OR [address] [place] [address];  Service: Urology;  Laterality: N/A;    CYSTOSCOPY W/ URETERAL STENT PLACEMENT N/A 2/25/2022    Procedure: CYSTOSCOPY, WITH URETERAL STENT INSERTION;  Surgeon: Lukasz Hughes MD;  Location: [facility] OR [address] [place] [address];  Service: Urology;  Laterality: N/A;    LASER LITHOTRIPSY Left 2/25/2022    Procedure: LITHOTRIPSY, USING LASER;  Surgeon: Lukasz Hughes MD;  Location: [facility] OR [address] [place] [address];  Service: Urology;  Laterality: Left;    LEFT HEART CATHETERIZATION Left 9/30/2020    Procedure: Left heart cath;  Surgeon: John West MD;  Location: [facility] CATH LAB;  Service: Cardiology;  Laterality: Left;    LITHOTRIPSY      PARATHYROIDECTOMY  1/1/2-107    PYELOSCOPY Left 2/25/2022    Procedure: PYELOSCOPY;  Surgeon: Lukasz Hughes MD;  Location: [facility] OR [address] [place] [address];  Service: Urology;  Laterality: Left;    TRANSESOPHAGEAL ECHOCARDIOGRAPHY N/A 4/7/2022    Procedure: ECHOCARDIOGRAM, TRANSESOPHAGEAL;  Surgeon: [facility];  Location: [facility] EP LAB;  Service: Cardiology;  Laterality: N/A;    TREATMENT OF CARDIAC ARRHYTHMIA N/A 4/7/2022    Procedure: Cardioversion or Defibrillation;  Surgeon: Iris Fisher NP;  Location: [facility] EP LAB;  Service: Cardiology;  Laterality: N/A;  afib, dccv, artie, anes, EH, 3prep    URETEROSCOPIC REMOVAL OF URETERIC CALCULUS Left 2/25/2022    Procedure: REMOVAL, CALCULUS, URETER, URETEROSCOPIC;  Surgeon: Lukasz Hughes MD;  [facility];  Service: Urology;  Laterality: Left;    URETEROSCOPY Left 2/25/2022    Procedure: URETEROSCOPY;  Surgeon: Lukasz Hughes MD;  Location: [facility] OR [address] [place] [address];  Service: Urology;  Laterality: Left;     Current Outpatient Medications on File Prior to Visit   Medication Sig Dispense Refill    ACCU-CHEK SOFTCLIX LANCETS Misc USE EVERY  each 6    allopurinoL (ZYLOPRIM) 100 MG tablet TAKE 1 TABLET BY MOUTH EVERY DAY 30 tablet 10    amiodarone (PACERONE) 200 MG Tab Take 1 tablet (200 mg total) by mouth once daily. 90 tablet 3    blood sugar diagnostic (ACCU-CHEK ANTONELLA PLUS TEST STRP) Strp Inject 1 strip into the skin 2 (two) times daily before meals. 100 strip 11    blood-glucose meter kit Checks blood sugars 1x/daily. 1 each 12    fenofibrate 160 MG Tab TAKE 1 TABLET(160 MG) BY MOUTH EVERY DAY (Patient taking differently: Take by mouth every evening.) 30 tablet 10    glimepiride (AMARYL) 2 MG tablet TAKE 2 TABLETS BY MOUTH EVERY MORNING 60 tablet 9    lisinopriL-hydrochlorothiazide (PRINZIDE,ZESTORETIC) 20-25 mg Tab TAKE 1 TABLET BY MOUTH EVERY DAY 90 tablet 1    metFORMIN (GLUCOPHAGE) 500 MG tablet TAKE 2 TABLETS BY MOUTH EVERY MORNING AND 2 TABLETS WITH DINNER 120 tablet 10    metoprolol succinate (TOPROL-XL) 25 MG 24 hr tablet Take 1 tablet (25 mg total) by mouth once daily. 30 tablet 11    nitroGLYCERIN (NITROSTAT) 0.4 MG SL tablet Place 1 tablet (0.4 mg total) under the tongue every 5 (five) minutes as needed for Chest pain. If you need a third tablet, call 911 60 tablet 12    potassium citrate (UROCIT-K) 10 mEq (1,080 mg) TbSR TAKE 1 TABLET BY MOUTH TWICE DAILY 60 tablet 9    rivaroxaban (XARELTO) 20 mg Tab Take 1 tablet (20 mg total) by mouth daily with dinner or evening meal. 30 tablet 11    rosuvastatin (CRESTOR) 20 MG tablet TAKE 1 TABLET(20 MG) BY MOUTH EVERY DAY (Patient taking differently: Take by mouth every evening.) 30 tablet 11    tamsulosin (FLOMAX) 0.4 mg Cap TAKE  1 CAPSULE(0.4 MG) BY MOUTH EVERY DAY 30 capsule 2    testosterone (ANDROGEL) 20.25 mg/1.25 gram (1.62 %) GlPm Apply 4 pumps to shoulders daily 2 each 5     No current facility-administered medications on file prior to visit.     Review of patient's allergies indicates:  No Known Allergies    Review of Systems   Constitutional: Negative for chills, decreased appetite, fever, malaise/fatigue, night sweats, weight gain and weight loss.   Cardiovascular: Negative for chest pain, claudication, dyspnea on exertion, leg swelling, palpitations and syncope.   Respiratory: Negative for cough and shortness of breath.    Endocrine: Negative for cold intolerance and heat intolerance.   Hematologic/Lymphatic: Negative for bleeding problem. Does not bruise/bleed easily.   Skin: Positive for nail changes. Negative for color change, dry skin, flushing, itching, poor wound healing, rash, skin cancer, suspicious lesions and unusual hair distribution.   Musculoskeletal: Positive for arthritis and stiffness. Negative for back pain, falls, gout, joint pain, joint swelling, muscle cramps, muscle weakness, myalgias and neck pain.   Gastrointestinal: Negative for diarrhea, nausea and vomiting.   Neurological: Positive for numbness and paresthesias. Negative for dizziness, focal weakness, light-headedness, tremors, vertigo and weakness.   Psychiatric/Behavioral: Negative for altered mental status and depression. The patient does not have insomnia.    Allergic/Immunologic: Negative.            Objective:       Vitals:    06/07/22 1437   BP: (!) 162/61   Pulse: (!) 51   Weight: 118.2 kg (260 lb 9.3 oz)   Height: 6' (1.829 m)   PainSc: 0-No pain   118.2 kg (260 lb 9.3 oz)     Physical Exam  Vitals reviewed.   Constitutional:       General: He is not in acute distress.     Appearance: He is well-developed. He is not ill-appearing, toxic-appearing or diaphoretic.      Comments: Proper supportive shoegear   Cardiovascular:      Pulses:            Dorsalis pedis pulses are 2+ on the right side and 2+ on the left side.        Posterior tibial pulses are 1+ on the right side and 1+ on the left side.   Musculoskeletal:         General: No tenderness.      Right lower leg: No tenderness. No edema.      Left lower leg: No tenderness. No edema.      Right foot: Decreased range of motion. Deformity, bunion and prominent metatarsal heads present. No tenderness or bony tenderness.      Left foot: Decreased range of motion. Deformity, bunion and prominent metatarsal heads present. No tenderness or bony tenderness.      Comments: Flexible pes planus foot type w/ medial arch collapse and mild gastroc equinus      Reducible extensor and flexor contractures at the MTPJ and PIPJ of toes 2-5, bilat.      1st MPJ exostosis w/ lateral deviation of hallux, non trackbound.       Feet:      Right foot:      Protective Sensation: 10 sites tested. 10 sites sensed.      Skin integrity: Dry skin and fissure present. No ulcer, blister, skin breakdown, erythema, warmth or callus.      Toenail Condition: Right toenails are abnormally thick and long.      Left foot:      Protective Sensation: 10 sites tested. 10 sites sensed.      Skin integrity: Dry skin and fissure present. No ulcer, blister, skin breakdown, erythema, warmth or callus.      Toenail Condition: Left toenails are abnormally thick and long.      Comments:   No open lesions    SWM intact     Nails 1-10 elongated, thickened.   Mild incurvation    Heels dry. Mild cracked skin, no bleeding or soi  Skin:     General: Skin is warm and dry.      Capillary Refill: Capillary refill takes 2 to 3 seconds.      Coloration: Skin is not pale.      Findings: No erythema or rash.      Nails: There is no clubbing.   Neurological:      Mental Status: He is alert and oriented to person, place, and time.      Gait: Gait abnormal.   Psychiatric:         Attention and Perception: Attention normal.         Mood and Affect: Mood normal.          Speech: Speech normal.         Behavior: Behavior normal.         Thought Content: Thought content normal.         Judgment: Judgment normal.               Assessment:       Encounter Diagnosis   Name Primary?    Diabetic feet          Plan:       Lukasz was seen today for diabetic foot exam.    Diagnoses and all orders for this visit:    Diabetic feet  -     Ambulatory referral/consult to Podiatry      I counseled the patient on his conditions, their implications and medical management.      - foot pain has resolved.    - Shoe inspection. Diabetic Foot Education. Patient reminded of the importance of good nutrition and blood sugar control to help prevent podiatric complications of diabetes. Patient instructed on proper foot hygeine. We discussed wearing proper shoe gear, daily foot inspections, never walking without protective shoe gear, never putting sharp instruments to feet         - With patient's permission, the toenails mentioned above were aggressively reduced and debrided using a nail nipper, removing all offending nail and debris.        Recommend use lotion at home. If does not improve heels message the clinic and I will rx a cream from [facility]    F/u yearly or sooner if needed

## 2022-06-25 ENCOUNTER — TELEPHONE ENCOUNTER (OUTPATIENT)
Age: 67
End: 2022-06-25

## 2022-06-25 RX ORDER — SODIUM SULFATE, POTASSIUM SULFATE, MAGNESIUM SULFATE 17.5; 3.13; 1.6 G/ML; G/ML; G/ML
SOLUTION, CONCENTRATE ORAL AS DIRECTED
Qty: 1 | Refills: 0 | OUTPATIENT
Start: 2017-05-23 | End: 2017-05-24

## 2022-06-26 ENCOUNTER — TELEPHONE ENCOUNTER (OUTPATIENT)
Age: 67
End: 2022-06-26

## 2022-06-26 RX ORDER — FENOFIBRATE 160 MG/1
FENOFIBRATE( 160MG ORAL 1 DAILY ) ACTIVE -HX ENTRY TABLET ORAL DAILY
Status: ACTIVE | COMMUNITY
Start: 2017-05-23

## 2022-06-26 RX ORDER — LISINOPRIL AND HYDROCHLOROTHIAZIDE TABLETS 20; 25 MG/1; MG/1
LISINOPRIL-HYDROCHLOROTHIAZIDE( 20-25MG ORAL 1 DAILY ) ACTIVE -HX ENTRY TABLET ORAL DAILY
Status: ACTIVE | COMMUNITY
Start: 2017-05-23

## 2022-06-26 RX ORDER — METFORMIN HCL 500 MG/1
METFORMIN HCL( 500MG ORAL 2 BID ) ACTIVE -HX ENTRY TABLET ORAL BID
Status: ACTIVE | COMMUNITY
Start: 2017-05-23

## 2022-06-26 RX ORDER — GLIMEPIRIDE 2 MG/1
GLIMEPIRIDE( 2MG ORAL 1 DAILY ) ACTIVE -HX ENTRY TABLET ORAL DAILY
Status: ACTIVE | COMMUNITY
Start: 2017-05-23

## 2022-06-26 RX ORDER — POTASSIUM CITRATE 10 MEQ/1
POTASSIUM CITRATE ER( 10 MEQ(1080 MG) ORAL 1 BID ) ACTIVE -HX ENTRY TABLET, EXTENDED RELEASE ORAL BID
Status: ACTIVE | COMMUNITY
Start: 2017-05-23

## 2022-06-28 DIAGNOSIS — N18.31 STAGE 3A CHRONIC KIDNEY DISEASE: Primary | ICD-10-CM

## 2022-07-01 ENCOUNTER — PATIENT MESSAGE (OUTPATIENT)
Dept: UROLOGY | Facility: CLINIC | Age: 67
End: 2022-07-01
Payer: MEDICARE

## 2022-07-01 ENCOUNTER — TELEPHONE (OUTPATIENT)
Dept: UROLOGY | Facility: HOSPITAL | Age: 67
End: 2022-07-01
Payer: MEDICARE

## 2022-07-01 NOTE — TELEPHONE ENCOUNTER
Sent the following message to the patient.  Please reach out to him if you can regarding some literature or handouts that we have regarding oxalate dietary restriction.  Thank you.      Here are your 24 hour urine results.    24 hour collection date: 6/17/22    Urine volume:  1.8L --Slightly low  (you need to increase your fluid intake, goal urine volume >2.5 liters/day)    PH:  5.9    Urinary Calcium:  140--normal     Urinary Oxalate:  77--quite high  (recommend dietary oxalate restriction)    Urinary citrate:  664--normal         Urinary uric acid: 0.631-- normal       Urinary sodium:  168--normal         He should focus on increasing fluid intake as well as decreasing dietary oxalate intake.  You can reach out to my nurse who has information on an oxalate restricted diet.  She can be reached Monday through Thursday at [phone].  Please let us know if any questions or concerns.      Lukasz Hughes

## 2022-07-05 ENCOUNTER — PATIENT MESSAGE (OUTPATIENT)
Dept: UROLOGY | Facility: CLINIC | Age: 67
End: 2022-07-05
Payer: MEDICARE

## 2022-07-05 NOTE — TELEPHONE ENCOUNTER
I sent two patient hand outs that we have regarding oxalate dietary restriction via an attachment to a patient message now.  Pt has seen the 24 hr urine results that was sent on 7/1/22.

## 2022-07-19 ENCOUNTER — PATIENT MESSAGE (OUTPATIENT)
Dept: RESEARCH | Facility: CLINIC | Age: 67
End: 2022-07-19
Payer: MEDICARE

## 2022-07-20 ENCOUNTER — TELEPHONE (OUTPATIENT)
Dept: ELECTROPHYSIOLOGY | Facility: CLINIC | Age: 67
End: 2022-07-20
Payer: MEDICARE

## 2022-07-20 NOTE — TELEPHONE ENCOUNTER
Called pt regarding appt on 8/18/22 needing to be rescheduled. No answer, left message and call back number.

## 2022-07-21 ENCOUNTER — TELEPHONE (OUTPATIENT)
Dept: ELECTROPHYSIOLOGY | Facility: CLINIC | Age: 67
End: 2022-07-21
Payer: MEDICARE

## 2022-07-21 NOTE — TELEPHONE ENCOUNTER
----- Message from Alesia Fernandez MA sent at 7/21/2022 10:13 AM CDT -----  Patient wants to know when he should stop taking the amiodarone patient can be reached at [phone]

## 2022-07-21 NOTE — TELEPHONE ENCOUNTER
Spoke with patient. He had to reschedule his appt with Dr Lin from August to September and wanted to know if he could go ahead and stop the amiodarone. Advised that he should wait until he sees Dr Lin before stopping the amiodarone. He verbalized understanding.

## 2022-08-03 ENCOUNTER — PATIENT MESSAGE (OUTPATIENT)
Dept: CARDIOLOGY | Facility: CLINIC | Age: 67
End: 2022-08-03
Payer: MEDICARE

## 2022-08-03 ENCOUNTER — PATIENT MESSAGE (OUTPATIENT)
Dept: ELECTROPHYSIOLOGY | Facility: CLINIC | Age: 67
End: 2022-08-03
Payer: MEDICARE

## 2022-08-05 ENCOUNTER — PATIENT MESSAGE (OUTPATIENT)
Dept: CARDIOLOGY | Facility: CLINIC | Age: 67
End: 2022-08-05
Payer: MEDICARE

## 2022-08-05 NOTE — TELEPHONE ENCOUNTER
Contacted pt but he has already reached out to the physician that did the cardioversion and was directed to follow up with GI. He is going to schedule an appt there.

## 2022-08-10 ENCOUNTER — TELEPHONE (OUTPATIENT)
Dept: ELECTROPHYSIOLOGY | Facility: CLINIC | Age: 67
End: 2022-08-10
Payer: MEDICARE

## 2022-08-10 NOTE — TELEPHONE ENCOUNTER
Patient was scheduled to see you this month and has had to be rescheduled twice (on our end). He is very concerned about remaining on the amiodarone until October.   Per your note on 5/16/22: He will continue on amiodarone for AAD and oral anticoagulation with rivaroxaban 20mg po daily. Should he remain in sinus rhythm at our next encounter in three months, we may discuss cessation of amiodarone.     He is asking if he can stop the amiodarone now.

## 2022-08-10 NOTE — TELEPHONE ENCOUNTER
----- Message from Laureen Ardon MA sent at 8/10/2022 10:54 AM CDT -----  Regarding: amiodarone  Pt is concerned because this is the second time his appointment got pushed back. On his last visit he was told that he should only take amiodarone for a certain amount of time. He wants to know when should he stop taking it. He stated that you can sent in a message in the portal.

## 2022-08-10 NOTE — TELEPHONE ENCOUNTER
Spoke with patient and relayed Dr Lin's recommendations, per her note. He verbalized understanding and will stop the Amiodarone while continuing the Xarelto. He will keep us posted if AF returns and keep his appointment in October, as scheduled.

## 2022-08-11 ENCOUNTER — PATIENT MESSAGE (OUTPATIENT)
Dept: ELECTROPHYSIOLOGY | Facility: CLINIC | Age: 67
End: 2022-08-11
Payer: MEDICARE

## 2022-08-26 ENCOUNTER — PATIENT MESSAGE (OUTPATIENT)
Dept: SLEEP MEDICINE | Facility: CLINIC | Age: 67
End: 2022-08-26
Payer: MEDICARE

## 2022-09-07 ENCOUNTER — LAB VISIT (OUTPATIENT)
Dept: LAB | Facility: HOSPITAL | Age: 67
End: 2022-09-07
Attending: HOSPITALIST
Payer: MEDICARE

## 2022-09-07 DIAGNOSIS — N18.31 STAGE 3A CHRONIC KIDNEY DISEASE: ICD-10-CM

## 2022-09-07 LAB
ALBUMIN SERPL BCP-MCNC: 4.1 G/DL (ref 3.5–5.2)
ANION GAP SERPL CALC-SCNC: 10 MMOL/L (ref 8–16)
BASOPHILS # BLD AUTO: 0.05 K/UL (ref 0–0.2)
BASOPHILS NFR BLD: 0.9 % (ref 0–1.9)
BUN SERPL-MCNC: 21 MG/DL (ref 8–23)
CALCIUM SERPL-MCNC: 10.2 MG/DL (ref 8.7–10.5)
CHLORIDE SERPL-SCNC: 104 MMOL/L (ref 95–110)
CO2 SERPL-SCNC: 27 MMOL/L (ref 23–29)
CREAT SERPL-MCNC: 1.6 MG/DL (ref 0.5–1.4)
DIFFERENTIAL METHOD: ABNORMAL
EOSINOPHIL # BLD AUTO: 0.3 K/UL (ref 0–0.5)
EOSINOPHIL NFR BLD: 4.7 % (ref 0–8)
ERYTHROCYTE [DISTWIDTH] IN BLOOD BY AUTOMATED COUNT: 14.7 % (ref 11.5–14.5)
EST. GFR  (NO RACE VARIABLE): 46.6 ML/MIN/1.73 M^2
GLUCOSE SERPL-MCNC: 128 MG/DL (ref 70–110)
HCT VFR BLD AUTO: 46.8 % (ref 40–54)
HGB BLD-MCNC: 14.5 G/DL (ref 14–18)
IMM GRANULOCYTES # BLD AUTO: 0.03 K/UL (ref 0–0.04)
IMM GRANULOCYTES NFR BLD AUTO: 0.5 % (ref 0–0.5)
LYMPHOCYTES # BLD AUTO: 1.9 K/UL (ref 1–4.8)
LYMPHOCYTES NFR BLD: 34.1 % (ref 18–48)
MCH RBC QN AUTO: 29.1 PG (ref 27–31)
MCHC RBC AUTO-ENTMCNC: 31 G/DL (ref 32–36)
MCV RBC AUTO: 94 FL (ref 82–98)
MONOCYTES # BLD AUTO: 0.5 K/UL (ref 0.3–1)
MONOCYTES NFR BLD: 8.7 % (ref 4–15)
NEUTROPHILS # BLD AUTO: 2.8 K/UL (ref 1.8–7.7)
NEUTROPHILS NFR BLD: 51.1 % (ref 38–73)
NRBC BLD-RTO: 0 /100 WBC
PHOSPHATE SERPL-MCNC: 2.7 MG/DL (ref 2.7–4.5)
PLATELET # BLD AUTO: 186 K/UL (ref 150–450)
PMV BLD AUTO: 11.1 FL (ref 9.2–12.9)
POTASSIUM SERPL-SCNC: 4.4 MMOL/L (ref 3.5–5.1)
RBC # BLD AUTO: 4.98 M/UL (ref 4.6–6.2)
SODIUM SERPL-SCNC: 141 MMOL/L (ref 136–145)
WBC # BLD AUTO: 5.54 K/UL (ref 3.9–12.7)

## 2022-09-07 PROCEDURE — 85025 COMPLETE CBC W/AUTO DIFF WBC: CPT | Performed by: HOSPITALIST

## 2022-09-07 PROCEDURE — 80069 RENAL FUNCTION PANEL: CPT | Performed by: HOSPITALIST

## 2022-09-07 PROCEDURE — 36415 COLL VENOUS BLD VENIPUNCTURE: CPT | Mod: PO | Performed by: HOSPITALIST

## 2022-09-13 ENCOUNTER — OFFICE VISIT (OUTPATIENT)
Dept: NEPHROLOGY | Facility: CLINIC | Age: 67
End: 2022-09-13
Payer: MEDICARE

## 2022-09-13 VITALS
BODY MASS INDEX: 33.31 KG/M2 | WEIGHT: 251.31 LBS | HEART RATE: 68 BPM | HEIGHT: 73 IN | OXYGEN SATURATION: 98 % | SYSTOLIC BLOOD PRESSURE: 132 MMHG | DIASTOLIC BLOOD PRESSURE: 72 MMHG

## 2022-09-13 DIAGNOSIS — E11.22 TYPE 2 DIABETES MELLITUS WITH STAGE 3 CHRONIC KIDNEY DISEASE, WITHOUT LONG-TERM CURRENT USE OF INSULIN, UNSPECIFIED WHETHER STAGE 3A OR 3B CKD: Chronic | ICD-10-CM

## 2022-09-13 DIAGNOSIS — E11.59 HYPERTENSION ASSOCIATED WITH DIABETES: Primary | Chronic | ICD-10-CM

## 2022-09-13 DIAGNOSIS — E11.69 HYPERLIPIDEMIA ASSOCIATED WITH TYPE 2 DIABETES MELLITUS: Chronic | ICD-10-CM

## 2022-09-13 DIAGNOSIS — N18.30 CKD STAGE 3 DUE TO TYPE 2 DIABETES MELLITUS: ICD-10-CM

## 2022-09-13 DIAGNOSIS — E78.5 HYPERLIPIDEMIA ASSOCIATED WITH TYPE 2 DIABETES MELLITUS: Chronic | ICD-10-CM

## 2022-09-13 DIAGNOSIS — I15.2 HYPERTENSION ASSOCIATED WITH DIABETES: Primary | Chronic | ICD-10-CM

## 2022-09-13 DIAGNOSIS — E66.01 SEVERE OBESITY (BMI 35.0-35.9 WITH COMORBIDITY): ICD-10-CM

## 2022-09-13 DIAGNOSIS — E11.22 CKD STAGE 3 DUE TO TYPE 2 DIABETES MELLITUS: ICD-10-CM

## 2022-09-13 DIAGNOSIS — N20.0 NEPHROLITHIASIS: ICD-10-CM

## 2022-09-13 DIAGNOSIS — N18.30 TYPE 2 DIABETES MELLITUS WITH STAGE 3 CHRONIC KIDNEY DISEASE, WITHOUT LONG-TERM CURRENT USE OF INSULIN, UNSPECIFIED WHETHER STAGE 3A OR 3B CKD: Chronic | ICD-10-CM

## 2022-09-13 PROCEDURE — 99213 OFFICE O/P EST LOW 20 MIN: CPT | Mod: PBBFAC | Performed by: HOSPITALIST

## 2022-09-13 PROCEDURE — 99214 PR OFFICE/OUTPT VISIT, EST, LEVL IV, 30-39 MIN: ICD-10-PCS | Mod: S$PBB,,, | Performed by: HOSPITALIST

## 2022-09-13 PROCEDURE — 99214 OFFICE O/P EST MOD 30 MIN: CPT | Mod: S$PBB,,, | Performed by: HOSPITALIST

## 2022-09-13 PROCEDURE — 99999 PR PBB SHADOW E&M-EST. PATIENT-LVL III: CPT | Mod: PBBFAC,,, | Performed by: HOSPITALIST

## 2022-09-13 PROCEDURE — 99999 PR PBB SHADOW E&M-EST. PATIENT-LVL III: ICD-10-PCS | Mod: PBBFAC,,, | Performed by: HOSPITALIST

## 2022-09-13 RX ORDER — POTASSIUM CITRATE 10 MEQ/1
10 TABLET, EXTENDED RELEASE ORAL
Qty: 90 TABLET | Refills: 9 | Status: SHIPPED | OUTPATIENT
Start: 2022-09-13 | End: 2023-02-10 | Stop reason: CLARIF

## 2022-09-13 NOTE — PATIENT INSTRUCTIONS
Potassium Citrate to 10 meq  three times a day   Can hold off on Fenofibrate if that's Ok with your PCP.

## 2022-09-13 NOTE — PROGRESS NOTES
REASON FOR CONSULT/CHIEF COMPLAIN:  H/o Chronic kidney disease stage 3    REFERRING PHYSICIAN: Pallavi Nick    This is a new clinic patient to me .      HISTORY OF PRESENT ILLNESS: 67 y.o. male who is established to me  has a past medical history of Anticoagulant long-term use, Diabetes mellitus, Hyperlipidemia, Hypertension, Kidney stones, and Sleep apnea. H/o kidney stones in the past, hyperparathyroidism s/p parathyroidectomy, gout, KUMAR referred here for abnormal renal function with creatinine at 1.6 and CKD stage 3 A. Pt has been following with Nephrologist at [place] , mentioned has calcium oxalate stones in the past and since his parathyroidectomy ( one of 4 glands removed), and being on potassium citrate he never had any episode since past 3 years. Pt has been compliant with drinking good amount of water, 64 ounces per day.Has been Diabetic since past 10 years  And well controlled. Recent urine micro albumin checked was with in normal limits. Pt already on ACE for his blood pressure. Also had elevated uric acid for which he is on allopurinol.    Today he mentions of not taking his blood pressure pills this AM and his pressure is slightly high.   No chronic NSAID use, no known exposure to lithium, lead.  Denied any issues with urination including dysuria, hematuria, urgency, hesitancy, nocturia, incomplete voiding. Denied chest pain, nausea, vomiting, abd pain, nausea, vomiting, diarrhea, shortness of breath, pedal edema, Orthopnea, PND.  Home Blood pressures are checked/not checked and stay around  130 to 135 systolic and 80 diastolic   Home Blood sugars are checked/not checked and well controlled.     Interval H/o 9/13   Pt mentions of hospitalization in Feb with fevers, chills , pyelonephritis, and kidney stone with s/p L nephrolithotripsy and ureteral stent placement on 2/25 also had  N/V, and left-sided flank pain. UA consistent with UTI.  Blood cultures negative. Pt received Rocephin IV. His stent  was removed late March 2022. Repeat renal ultrasound with right small stone with no hydronephrosis or obstruction.       ROS:  General: negative for chills, or fatigue  ENT: No epistaxis or headaches  Hematological and Lymphatic: No bleeding problems or blood clots.  Endocrine: No skin changes or temperature intolerance  Respiratory: No cough, shortness of breath, or wheezing  Cardiovascular: No chest pain or dyspnea   Gastrointestinal: No abdominal pain, change in bowel habits  Genito-Urinary: No dysuria, trouble voiding, or hematuria  Musculoskeletal: ROS: negative for - joint pain, joint stiffness, joint swelling, muscle pain or muscular weakness  Neurological: No new focal weakness, no numbness  Dermatological: No rash or ulcers.    PAST MEDICAL HISTORY:  Past Medical History:   Diagnosis Date    Anticoagulant long-term use     Diabetes mellitus     Onset late 50s/early 60s    Hyperlipidemia     Hypertension     Onset late 50s/early 60s    Kidney stones     Sleep apnea     since 2006       PAST SURGICAL HISTORY:  Past Surgical History:   Procedure Laterality Date    CORONARY ANGIOGRAPHY N/A 9/30/2020    Procedure: ANGIOGRAM, CORONARY ARTERY;  Surgeon: John West MD;  Location: [facility] CATH LAB;  Service: Cardiology;  Laterality: N/A;    CYSTOSCOPY N/A 2/17/2022    Procedure: CYSTOSCOPY;  Surgeon: Lukasz Hughes MD;  Location: [facility] OR [address] [place] [address];  Service: Urology;  Laterality: N/A;    CYSTOSCOPY W/ URETERAL STENT PLACEMENT N/A 2/25/2022    Procedure: CYSTOSCOPY, WITH URETERAL STENT INSERTION;  Surgeon: Lukasz Hughes MD;  Location: [address];  Service: Urology;  Laterality: N/A;    LASER LITHOTRIPSY Left 2/25/2022    Procedure: LITHOTRIPSY, USING LASER;  Surgeon: Lukasz Hughes MD;  Location: [facility] OR [address] [place] [address];  Service: Urology;  Laterality: Left;    LEFT HEART CATHETERIZATION Left 9/30/2020    Procedure: Left heart cath;  Surgeon: John West MD;  Location: [facility] CATH LAB;  Service:  Cardiology;  Laterality: Left;    LITHOTRIPSY      PARATHYROIDECTOMY  -107    PYELOSCOPY Left 2022    Procedure: PYELOSCOPY;  Surgeon: Lukasz Hughes MD;  Location: [facility] OR [address] [place] [address];  Service: Urology;  Laterality: Left;    TRANSESOPHAGEAL ECHOCARDIOGRAPHY N/A 2022    Procedure: ECHOCARDIOGRAM, TRANSESOPHAGEAL;  Surgeon: Xander Diagnostic Provider;  Location: [facility] EP LAB;  Service: Cardiology;  Laterality: N/A;    TREATMENT OF CARDIAC ARRHYTHMIA N/A 2022    Procedure: Cardioversion or Defibrillation;  Surgeon: Iris Fisher NP;  Location: [facility] EP LAB;  Service: Cardiology;  Laterality: N/A;  afib, dccv, artie, anes, EH, 3prep    URETEROSCOPIC REMOVAL OF URETERIC CALCULUS Left 2022    Procedure: REMOVAL, CALCULUS, URETER, URETEROSCOPIC;  Surgeon: Lukasz Hughes MD;  Location: [facility] OR [address] [place] [address];  Service: Urology;  Laterality: Left;    URETEROSCOPY Left 2022    Procedure: URETEROSCOPY;  Surgeon: Lukasz Hughes MD;  Location: [facility] OR [address] [place] [address];  Service: Urology;  Laterality: Left;       FAMILY HISTORY:   Family History   Problem Relation Age of Onset    Arthritis Mother     Heart disease Father 70        CABG    Arthritis Father     Diabetes Father     Kidney disease Father         had one kidney removed in early thirties    Arthritis Sister     Arthritis Sister     Hypertension Sister     Colon cancer Brother 32    Arthritis Brother     Alcohol abuse Paternal Aunt     Cancer Maternal Grandfather         throat cancer    Diabetes Paternal Grandmother     Colon polyps Paternal Grandfather     Colon cancer Paternal Grandfather     Cancer Paternal Grandfather         colon cancer at age 62       SOCIAL HISTORY:  Social History     Socioeconomic History    Marital status:    Tobacco Use    Smoking status: Former     Packs/day: 1.00     Years: 7.00     Pack years: 7.00     Types: Cigarettes, Cigars     Start date: 1972     Quit date:      Years since quittin.3     Smokeless tobacco: Never    Tobacco comments:     smoking was off and on.  cumulative 7 years.  never more than three years in one stretch or more lj   Substance and Sexual Activity    Alcohol use: Yes     Alcohol/week: 3.0 standard drinks     Types: 2 Cans of beer, 1 Shots of liquor per week     Comment: don't drink regularly.  6 to 7 monthly    Drug use: Not Currently     Types: Marijuana    Sexual activity: Not Currently     Partners: Female     Birth control/protection: None     Comment:      Social Determinants of Health     Financial Resource Strain: Low Risk     Difficulty of Paying Living Expenses: Not hard at all   Food Insecurity: No Food Insecurity    Worried About Running Out of Food in the Last Year: Never true    Ran Out of Food in the Last Year: Never true   Transportation Needs: No Transportation Needs    Lack of Transportation (Medical): No    Lack of Transportation (Non-Medical): No   Physical Activity: Insufficiently Active    Days of Exercise per Week: 3 days    Minutes of Exercise per Session: 30 min   Stress: No Stress Concern Present    Feeling of Stress : Only a little   Social Connections: Unknown    Frequency of Communication with Friends and Family: Once a week    Frequency of Social Gatherings with Friends and Family: Once a week    Active Member of Clubs or Organizations: Yes    Attends Club or Organization Meetings: More than 4 times per year    Marital Status:    Housing Stability: Low Risk     Unable to Pay for Housing in the Last Year: No    Number of Places Lived in the Last Year: 1    Unstable Housing in the Last Year: No       ALLERGIES:  Review of patient's allergies indicates:  No Known Allergies    MEDICATIONS:    Current Outpatient Medications:     ACCU-CHEK SOFTCLIX LANCETS Misc, USE EVERY DAY, Disp: 100 each, Rfl: 6    allopurinoL (ZYLOPRIM) 100 MG tablet, TAKE 1 TABLET BY MOUTH EVERY DAY, Disp: 30 tablet, Rfl: 10    blood sugar diagnostic (ACCU-CHEK ANTONELLA  PLUS TEST STRP) Strp, Inject 1 strip into the skin 2 (two) times daily before meals., Disp: 100 strip, Rfl: 11    blood-glucose meter kit, Checks blood sugars 1x/daily., Disp: 1 each, Rfl: 12    fenofibrate 160 MG Tab, TAKE 1 TABLET(160 MG) BY MOUTH EVERY DAY (Patient taking differently: Take by mouth every evening.), Disp: 30 tablet, Rfl: 10    glimepiride (AMARYL) 2 MG tablet, TAKE 2 TABLETS BY MOUTH EVERY MORNING, Disp: 60 tablet, Rfl: 9    lisinopriL-hydrochlorothiazide (PRINZIDE,ZESTORETIC) 20-25 mg Tab, TAKE 1 TABLET BY MOUTH EVERY DAY, Disp: 90 tablet, Rfl: 1    metFORMIN (GLUCOPHAGE) 500 MG tablet, TAKE 2 TABLETS BY MOUTH EVERY MORNING AND 2 TABLETS WITH DINNER, Disp: 120 tablet, Rfl: 10    metoprolol succinate (TOPROL-XL) 25 MG 24 hr tablet, Take 1 tablet (25 mg total) by mouth once daily., Disp: 30 tablet, Rfl: 11    nitroGLYCERIN (NITROSTAT) 0.4 MG SL tablet, Place 1 tablet (0.4 mg total) under the tongue every 5 (five) minutes as needed for Chest pain. If you need a third tablet, call 911, Disp: 60 tablet, Rfl: 12    rivaroxaban (XARELTO) 20 mg Tab, Take 1 tablet (20 mg total) by mouth daily with dinner or evening meal., Disp: 30 tablet, Rfl: 11    rosuvastatin (CRESTOR) 20 MG tablet, TAKE 1 TABLET(20 MG) BY MOUTH EVERY DAY, Disp: 30 tablet, Rfl: 11    tamsulosin (FLOMAX) 0.4 mg Cap, TAKE 1 CAPSULE(0.4 MG) BY MOUTH EVERY DAY, Disp: 30 capsule, Rfl: 2    testosterone (ANDROGEL) 20.25 mg/1.25 gram (1.62 %) GlPm, Apply 4 pumps to shoulders daily, Disp: 2 each, Rfl: 5    potassium citrate (UROCIT-K) 10 mEq (1,080 mg) TbSR, Take 1 tablet (10 mEq total) by mouth 3 (three) times daily with meals., Disp: 90 tablet, Rfl: 9   Medication List with Changes/Refills   Current Medications    ACCU-CHEK SOFTCLIX LANCETS MISC    USE EVERY DAY    ALLOPURINOL (ZYLOPRIM) 100 MG TABLET    TAKE 1 TABLET BY MOUTH EVERY DAY    BLOOD SUGAR DIAGNOSTIC (ACCU-CHEK ANTONELLA PLUS TEST STRP) STRP    Inject 1 strip into the skin 2 (two)  "times daily before meals.    BLOOD-GLUCOSE METER KIT    Checks blood sugars 1x/daily.    FENOFIBRATE 160 MG TAB    TAKE 1 TABLET(160 MG) BY MOUTH EVERY DAY    GLIMEPIRIDE (AMARYL) 2 MG TABLET    TAKE 2 TABLETS BY MOUTH EVERY MORNING    LISINOPRIL-HYDROCHLOROTHIAZIDE (PRINZIDE,ZESTORETIC) 20-25 MG TAB    TAKE 1 TABLET BY MOUTH EVERY DAY    METFORMIN (GLUCOPHAGE) 500 MG TABLET    TAKE 2 TABLETS BY MOUTH EVERY MORNING AND 2 TABLETS WITH DINNER    METOPROLOL SUCCINATE (TOPROL-XL) 25 MG 24 HR TABLET    Take 1 tablet (25 mg total) by mouth once daily.    NITROGLYCERIN (NITROSTAT) 0.4 MG SL TABLET    Place 1 tablet (0.4 mg total) under the tongue every 5 (five) minutes as needed for Chest pain. If you need a third tablet, call 911    RIVAROXABAN (XARELTO) 20 MG TAB    Take 1 tablet (20 mg total) by mouth daily with dinner or evening meal.    ROSUVASTATIN (CRESTOR) 20 MG TABLET    TAKE 1 TABLET(20 MG) BY MOUTH EVERY DAY    TAMSULOSIN (FLOMAX) 0.4 MG CAP    TAKE 1 CAPSULE(0.4 MG) BY MOUTH EVERY DAY    TESTOSTERONE (ANDROGEL) 20.25 MG/1.25 GRAM (1.62 %) GLPM    Apply 4 pumps to shoulders daily   Changed and/or Refilled Medications    Modified Medication Previous Medication    POTASSIUM CITRATE (UROCIT-K) 10 MEQ (1,080 MG) TBSR potassium citrate (UROCIT-K) 10 mEq (1,080 mg) TbSR       Take 1 tablet (10 mEq total) by mouth 3 (three) times daily with meals.    TAKE 1 TABLET BY MOUTH TWICE DAILY        PHYSICAL EXAM:  /72   Pulse 68   Ht 6' 1" (1.854 m)   Wt 114 kg (251 lb 5.2 oz)   SpO2 98%   BMI 33.16 kg/m²     General: No distress,  Moderate build ,No fever or chills  Head: Normocephalic,atraumatic  Eyes: conjunctivae/corneas clear. PERRL, EOM's intact.  Nose: Nares normal. Mucosa normal. No drainage or sinus tenderness.  Neck: No adenopathy,no carotid bruit,no JVD  Lungs:Clear to auscultation bilaterally, No Crackles  Heart: Regular rate and rhythm, no murmur, gallops or rubs  Abdomen: Soft, no tenderness, bowel " sounds normal  Extremities: Atraumatic, no edema in LE  Skin: Turgor normal. No rashes or ulcers  Neurologic: No focal weakness, oriented.  Dialysis Access: none         LABS:  Lab Results   Component Value Date    HGB 14.5 09/07/2022        Lab Results   Component Value Date    CREATININE 1.6 (H) 09/07/2022       Prot/Creat Ratio, Urine   Date Value Ref Range Status   09/07/2022 0.10 0.00 - 0.20 Final   09/15/2020 0.12 0.00 - 0.20 Final       Lab Results   Component Value Date     09/07/2022    K 4.4 09/07/2022    CO2 27 09/07/2022       last PTH   Lab Results   Component Value Date    PTH 25.4 02/15/2022    CALCIUM 10.2 09/07/2022    PHOS 2.7 09/07/2022       Lab Results   Component Value Date    HGBA1C 6.8 (H) 05/02/2022       Lab Results   Component Value Date    LDLCALC 30.2 (L) 03/22/2022         Creatinine, Urine   Date Value Ref Range Status   09/07/2022 178.0 23.0 - 375.0 mg/dL Final   05/02/2022 100.0 23.0 - 375.0 mg/dL Final   05/31/2021 167.0 23.0 - 375.0 mg/dL Final       Protein, Urine Random   Date Value Ref Range Status   09/07/2022 18 (H) 0 - 15 mg/dL Final   09/15/2020 14 0 - 15 mg/dL Final     Comment:     The random urine reference ranges provided were established   for 24 hour urine collections.  No reference ranges exist for  random urine specimens.  Correlate clinically.         Prot/Creat Ratio, Urine   Date Value Ref Range Status   09/07/2022 0.10 0.00 - 0.20 Final   09/15/2020 0.12 0.00 - 0.20 Final          Problem List    Chronic kidney disease stage 3A  Diabetes Mellitus   Essential hypertension  Hyperlipidemia  Gout  H/o kidney stones   S/p parathyroidectomy  KUMAR    ASSESSMENT and Plan      CKD stage 3 A secondary to parenchymal damage from multiple nephrolithiasis  -Baseline Creatinine is around 1.5 to 1.6, and is stable currently at 1.6 mg/dl  -Recent stone episode with s/p L nephrolithotripsy and ureteral stent placement on 2/25 with UTI and perinephric stranding  - S/p stent  removal March 2022  -Stone analysis with 100 % calcium oxalate monohydrate    On Potassium citrate 10 meq BID   Urine ph at 5   Would increase potassium citrate to 10 meq TID and consider 24 hour urine for stone analysis next visit.  -Most likely etiology for CKD is  DM, HTN, arterial atherosclerosis  Urine micro albumin to creatinine ratio is with in limits   Electrolytes, Acid Base, Hemoglobin, Bone Mineral in acceptable range.   Uric acid and PTH with in limits   S/p parathyroidectomy.  Avoid NSAIDs intake   Also noted he is on Fenofibrate along with statin. Discussed with his PCP and given his Lipid panel with in limits and cases of fenofibrate causing ABDI, will hold off on Fenofibrate currently.    Diabetes Mellitus   Continue current medications      Essential Hypertension   Blood pressures elevated at clinic , m,entioned of not taking his pill this AM   Usually are well controlled   Encouraged home Bp monitor    -Continue current BP meds regimen  -Avoid NSAIDs intake     H/o kidney stones    S/p Parathyroidectomy    Increase Potassium citrate  to 10 meq TID      H/o Gout   On allopurinol   No arthritis   Will check Uric acid levels .    - General risk factors for kidney stones and the conservative measures to prevent kidney stones in the future were discussed with the patient in detail.  The patient was encouraged to drink 2-3 liters of water a day, limit iced tea and pasquale as well as foods high in oxalate.  They were cautioned to try to limit salt and red meat intake.  We also discussed adding citrate to the diet with the addition of atul or lemon juice to their water or alternatively with crystal light.     RTC in 2 weeks     Diagnosis and plan of care explained to the patient.  Pt Verbalized understanding. Answered all questions.  Thanks for allowing me to participate in the care of this patient.       Karol Oconnell MD  Nephrology  [facility].

## 2022-09-23 DIAGNOSIS — I15.2 HYPERTENSION ASSOCIATED WITH DIABETES: Primary | ICD-10-CM

## 2022-09-23 DIAGNOSIS — E11.59 HYPERTENSION ASSOCIATED WITH DIABETES: Primary | ICD-10-CM

## 2022-09-28 ENCOUNTER — PATIENT MESSAGE (OUTPATIENT)
Dept: INTERNAL MEDICINE | Facility: CLINIC | Age: 67
End: 2022-09-28
Payer: MEDICARE

## 2022-10-03 ENCOUNTER — TELEPHONE (OUTPATIENT)
Dept: ELECTROPHYSIOLOGY | Facility: CLINIC | Age: 67
End: 2022-10-03
Payer: MEDICARE

## 2022-10-03 ENCOUNTER — HOSPITAL ENCOUNTER (OUTPATIENT)
Dept: CARDIOLOGY | Facility: CLINIC | Age: 67
Discharge: HOME | End: 2022-10-03
Payer: MEDICARE

## 2022-10-03 ENCOUNTER — OFFICE VISIT (OUTPATIENT)
Dept: ELECTROPHYSIOLOGY | Facility: CLINIC | Age: 67
End: 2022-10-03
Payer: MEDICARE

## 2022-10-03 VITALS
HEIGHT: 73 IN | HEART RATE: 52 BPM | DIASTOLIC BLOOD PRESSURE: 74 MMHG | WEIGHT: 265.88 LBS | SYSTOLIC BLOOD PRESSURE: 142 MMHG | BODY MASS INDEX: 35.24 KG/M2

## 2022-10-03 DIAGNOSIS — N18.30 TYPE 2 DIABETES MELLITUS WITH STAGE 3 CHRONIC KIDNEY DISEASE, WITHOUT LONG-TERM CURRENT USE OF INSULIN, UNSPECIFIED WHETHER STAGE 3A OR 3B CKD: ICD-10-CM

## 2022-10-03 DIAGNOSIS — I48.0 PAROXYSMAL ATRIAL FIBRILLATION: Primary | ICD-10-CM

## 2022-10-03 DIAGNOSIS — E66.9 CLASS 2 OBESITY WITH BODY MASS INDEX (BMI) OF 35.0 TO 35.9 IN ADULT, UNSPECIFIED OBESITY TYPE, UNSPECIFIED WHETHER SERIOUS COMORBIDITY PRESENT: ICD-10-CM

## 2022-10-03 DIAGNOSIS — I25.10 CORONARY ARTERY DISEASE INVOLVING NATIVE CORONARY ARTERY OF NATIVE HEART WITHOUT ANGINA PECTORIS: ICD-10-CM

## 2022-10-03 DIAGNOSIS — Z87.891 HISTORY OF TOBACCO USE: ICD-10-CM

## 2022-10-03 DIAGNOSIS — I50.20 HFREF (HEART FAILURE WITH REDUCED EJECTION FRACTION): ICD-10-CM

## 2022-10-03 DIAGNOSIS — E78.2 MIXED HYPERLIPIDEMIA: ICD-10-CM

## 2022-10-03 DIAGNOSIS — I10 PRIMARY HYPERTENSION: ICD-10-CM

## 2022-10-03 DIAGNOSIS — I48.91 ATRIAL FIBRILLATION, UNSPECIFIED TYPE: ICD-10-CM

## 2022-10-03 DIAGNOSIS — E11.22 TYPE 2 DIABETES MELLITUS WITH STAGE 3 CHRONIC KIDNEY DISEASE, WITHOUT LONG-TERM CURRENT USE OF INSULIN, UNSPECIFIED WHETHER STAGE 3A OR 3B CKD: ICD-10-CM

## 2022-10-03 DIAGNOSIS — G47.33 OSA ON CPAP: ICD-10-CM

## 2022-10-03 DIAGNOSIS — Z98.890 HISTORY OF PARATHYROIDECTOMY: ICD-10-CM

## 2022-10-03 DIAGNOSIS — Z87.448 HISTORY OF PYELONEPHRITIS: ICD-10-CM

## 2022-10-03 DIAGNOSIS — Z98.890 HISTORY OF LITHOTRIPSY: ICD-10-CM

## 2022-10-03 DIAGNOSIS — N18.30 STAGE 3 CHRONIC KIDNEY DISEASE, UNSPECIFIED WHETHER STAGE 3A OR 3B CKD: ICD-10-CM

## 2022-10-03 DIAGNOSIS — E66.9 CLASS 1 OBESITY WITH BODY MASS INDEX (BMI) OF 34.0 TO 34.9 IN ADULT, UNSPECIFIED OBESITY TYPE, UNSPECIFIED WHETHER SERIOUS COMORBIDITY PRESENT: ICD-10-CM

## 2022-10-03 DIAGNOSIS — I35.8 AORTIC VALVE SCLEROSIS: ICD-10-CM

## 2022-10-03 DIAGNOSIS — Z90.89 HISTORY OF PARATHYROIDECTOMY: ICD-10-CM

## 2022-10-03 DIAGNOSIS — E21.0 HYPERPARATHYROIDISM, PRIMARY: ICD-10-CM

## 2022-10-03 PROCEDURE — 99213 OFFICE O/P EST LOW 20 MIN: CPT | Mod: PBBFAC | Performed by: STUDENT IN AN ORGANIZED HEALTH CARE EDUCATION/TRAINING PROGRAM

## 2022-10-03 PROCEDURE — 99214 PR OFFICE/OUTPT VISIT, EST, LEVL IV, 30-39 MIN: ICD-10-PCS | Mod: S$PBB,,, | Performed by: STUDENT IN AN ORGANIZED HEALTH CARE EDUCATION/TRAINING PROGRAM

## 2022-10-03 PROCEDURE — 93010 ELECTROCARDIOGRAM REPORT: CPT | Mod: S$PBB,,, | Performed by: INTERNAL MEDICINE

## 2022-10-03 PROCEDURE — 99214 OFFICE O/P EST MOD 30 MIN: CPT | Mod: S$PBB,,, | Performed by: STUDENT IN AN ORGANIZED HEALTH CARE EDUCATION/TRAINING PROGRAM

## 2022-10-03 PROCEDURE — 93010 RHYTHM STRIP: ICD-10-PCS | Mod: S$PBB,,, | Performed by: INTERNAL MEDICINE

## 2022-10-03 PROCEDURE — 99999 PR PBB SHADOW E&M-EST. PATIENT-LVL III: CPT | Mod: PBBFAC,,, | Performed by: STUDENT IN AN ORGANIZED HEALTH CARE EDUCATION/TRAINING PROGRAM

## 2022-10-03 PROCEDURE — 93005 ELECTROCARDIOGRAM TRACING: CPT | Mod: PBBFAC | Performed by: INTERNAL MEDICINE

## 2022-10-03 PROCEDURE — 99999 PR PBB SHADOW E&M-EST. PATIENT-LVL III: ICD-10-PCS | Mod: PBBFAC,,, | Performed by: STUDENT IN AN ORGANIZED HEALTH CARE EDUCATION/TRAINING PROGRAM

## 2022-10-03 NOTE — TELEPHONE ENCOUNTER
Patient has some things coming up and decided to wait on scheduling his cardiac monitor. He will give us a call to schedule the monitor when he is ready
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
## 2022-10-04 ENCOUNTER — LAB VISIT (OUTPATIENT)
Dept: LAB | Facility: HOSPITAL | Age: 67
End: 2022-10-04
Attending: UROLOGY
Payer: MEDICARE

## 2022-10-04 ENCOUNTER — OFFICE VISIT (OUTPATIENT)
Dept: INTERNAL MEDICINE | Facility: CLINIC | Age: 67
End: 2022-10-04
Payer: MEDICARE

## 2022-10-04 ENCOUNTER — PATIENT MESSAGE (OUTPATIENT)
Dept: ELECTROPHYSIOLOGY | Facility: CLINIC | Age: 67
End: 2022-10-04
Payer: MEDICARE

## 2022-10-04 ENCOUNTER — PATIENT MESSAGE (OUTPATIENT)
Dept: CARDIOLOGY | Facility: CLINIC | Age: 67
End: 2022-10-04
Payer: MEDICARE

## 2022-10-04 ENCOUNTER — TELEPHONE (OUTPATIENT)
Dept: ENDOSCOPY | Facility: HOSPITAL | Age: 67
End: 2022-10-04
Payer: MEDICARE

## 2022-10-04 VITALS
HEIGHT: 72 IN | BODY MASS INDEX: 36.1 KG/M2 | WEIGHT: 266.56 LBS | DIASTOLIC BLOOD PRESSURE: 60 MMHG | TEMPERATURE: 98 F | OXYGEN SATURATION: 97 % | SYSTOLIC BLOOD PRESSURE: 130 MMHG | HEART RATE: 62 BPM | RESPIRATION RATE: 19 BRPM

## 2022-10-04 DIAGNOSIS — E11.59 HYPERTENSION ASSOCIATED WITH DIABETES: Chronic | ICD-10-CM

## 2022-10-04 DIAGNOSIS — E78.5 HYPERLIPIDEMIA ASSOCIATED WITH TYPE 2 DIABETES MELLITUS: Chronic | ICD-10-CM

## 2022-10-04 DIAGNOSIS — Z79.899 ENCOUNTER FOR LONG-TERM (CURRENT) USE OF HIGH-RISK MEDICATION: ICD-10-CM

## 2022-10-04 DIAGNOSIS — G47.33 OSA ON CPAP: Chronic | ICD-10-CM

## 2022-10-04 DIAGNOSIS — E29.1 MALE HYPOGONADISM: Chronic | ICD-10-CM

## 2022-10-04 DIAGNOSIS — I50.20 HFREF (HEART FAILURE WITH REDUCED EJECTION FRACTION): Chronic | ICD-10-CM

## 2022-10-04 DIAGNOSIS — R13.10 DYSPHAGIA, UNSPECIFIED TYPE: ICD-10-CM

## 2022-10-04 DIAGNOSIS — E11.69 HYPERLIPIDEMIA ASSOCIATED WITH TYPE 2 DIABETES MELLITUS: Chronic | ICD-10-CM

## 2022-10-04 DIAGNOSIS — E11.22 TYPE 2 DIABETES MELLITUS WITH STAGE 3 CHRONIC KIDNEY DISEASE, WITHOUT LONG-TERM CURRENT USE OF INSULIN, UNSPECIFIED WHETHER STAGE 3A OR 3B CKD: Primary | Chronic | ICD-10-CM

## 2022-10-04 DIAGNOSIS — I25.10 CORONARY ARTERY DISEASE INVOLVING NATIVE CORONARY ARTERY OF NATIVE HEART WITHOUT ANGINA PECTORIS: Chronic | ICD-10-CM

## 2022-10-04 DIAGNOSIS — I42.8 NON-ISCHEMIC CARDIOMYOPATHY: Chronic | ICD-10-CM

## 2022-10-04 DIAGNOSIS — R53.1 WEAKNESS: ICD-10-CM

## 2022-10-04 DIAGNOSIS — N18.30 TYPE 2 DIABETES MELLITUS WITH STAGE 3 CHRONIC KIDNEY DISEASE, WITHOUT LONG-TERM CURRENT USE OF INSULIN, UNSPECIFIED WHETHER STAGE 3A OR 3B CKD: Primary | Chronic | ICD-10-CM

## 2022-10-04 DIAGNOSIS — I48.0 PAROXYSMAL ATRIAL FIBRILLATION: Chronic | ICD-10-CM

## 2022-10-04 DIAGNOSIS — N18.30 TYPE 2 DIABETES MELLITUS WITH STAGE 3 CHRONIC KIDNEY DISEASE, WITHOUT LONG-TERM CURRENT USE OF INSULIN, UNSPECIFIED WHETHER STAGE 3A OR 3B CKD: Chronic | ICD-10-CM

## 2022-10-04 DIAGNOSIS — E11.22 TYPE 2 DIABETES MELLITUS WITH STAGE 3 CHRONIC KIDNEY DISEASE, WITHOUT LONG-TERM CURRENT USE OF INSULIN, UNSPECIFIED WHETHER STAGE 3A OR 3B CKD: Chronic | ICD-10-CM

## 2022-10-04 DIAGNOSIS — N40.1 BPH WITH URINARY OBSTRUCTION: ICD-10-CM

## 2022-10-04 DIAGNOSIS — N13.8 BPH WITH URINARY OBSTRUCTION: ICD-10-CM

## 2022-10-04 DIAGNOSIS — I15.2 HYPERTENSION ASSOCIATED WITH DIABETES: Chronic | ICD-10-CM

## 2022-10-04 LAB
ALBUMIN SERPL BCP-MCNC: 4.1 G/DL (ref 3.5–5.2)
ALBUMIN SERPL BCP-MCNC: 4.1 G/DL (ref 3.5–5.2)
ALP SERPL-CCNC: 47 U/L (ref 55–135)
ALP SERPL-CCNC: 47 U/L (ref 55–135)
ALT SERPL W/O P-5'-P-CCNC: 22 U/L (ref 10–44)
ALT SERPL W/O P-5'-P-CCNC: 22 U/L (ref 10–44)
ANION GAP SERPL CALC-SCNC: 10 MMOL/L (ref 8–16)
AST SERPL-CCNC: 25 U/L (ref 10–40)
AST SERPL-CCNC: 25 U/L (ref 10–40)
BILIRUB DIRECT SERPL-MCNC: 0.3 MG/DL (ref 0.1–0.3)
BILIRUB SERPL-MCNC: 0.6 MG/DL (ref 0.1–1)
BILIRUB SERPL-MCNC: 0.6 MG/DL (ref 0.1–1)
BUN SERPL-MCNC: 15 MG/DL (ref 8–23)
CALCIUM SERPL-MCNC: 9.6 MG/DL (ref 8.7–10.5)
CHLORIDE SERPL-SCNC: 98 MMOL/L (ref 95–110)
CHOLEST SERPL-MCNC: 98 MG/DL (ref 120–199)
CHOLEST/HDLC SERPL: 2.6 {RATIO} (ref 2–5)
CO2 SERPL-SCNC: 29 MMOL/L (ref 23–29)
COMPLEXED PSA SERPL-MCNC: 1.6 NG/ML (ref 0–4)
CREAT SERPL-MCNC: 1.5 MG/DL (ref 0.5–1.4)
EST. GFR  (NO RACE VARIABLE): 50.4 ML/MIN/1.73 M^2
ESTIMATED AVG GLUCOSE: 137 MG/DL (ref 68–131)
GLUCOSE SERPL-MCNC: 198 MG/DL (ref 70–110)
HBA1C MFR BLD: 6.4 % (ref 4–5.6)
HDLC SERPL-MCNC: 38 MG/DL (ref 40–75)
HDLC SERPL: 38.8 % (ref 20–50)
LDLC SERPL CALC-MCNC: 16.2 MG/DL (ref 63–159)
NONHDLC SERPL-MCNC: 60 MG/DL
POTASSIUM SERPL-SCNC: 4.6 MMOL/L (ref 3.5–5.1)
PROT SERPL-MCNC: 7 G/DL (ref 6–8.4)
PROT SERPL-MCNC: 7 G/DL (ref 6–8.4)
SODIUM SERPL-SCNC: 137 MMOL/L (ref 136–145)
TESTOST SERPL-MCNC: 203 NG/DL (ref 304–1227)
TRIGL SERPL-MCNC: 219 MG/DL (ref 30–150)

## 2022-10-04 PROCEDURE — 80061 LIPID PANEL: CPT | Performed by: NURSE PRACTITIONER

## 2022-10-04 PROCEDURE — 84153 ASSAY OF PSA TOTAL: CPT | Performed by: UROLOGY

## 2022-10-04 PROCEDURE — 99999 PR PBB SHADOW E&M-EST. PATIENT-LVL V: CPT | Mod: PBBFAC,,, | Performed by: INTERNAL MEDICINE

## 2022-10-04 PROCEDURE — 99214 PR OFFICE/OUTPT VISIT, EST, LEVL IV, 30-39 MIN: ICD-10-PCS | Mod: S$PBB,,, | Performed by: INTERNAL MEDICINE

## 2022-10-04 PROCEDURE — 80076 HEPATIC FUNCTION PANEL: CPT | Performed by: UROLOGY

## 2022-10-04 PROCEDURE — 99215 OFFICE O/P EST HI 40 MIN: CPT | Mod: PBBFAC,PO | Performed by: INTERNAL MEDICINE

## 2022-10-04 PROCEDURE — 80053 COMPREHEN METABOLIC PANEL: CPT | Performed by: INTERNAL MEDICINE

## 2022-10-04 PROCEDURE — 84403 ASSAY OF TOTAL TESTOSTERONE: CPT | Performed by: UROLOGY

## 2022-10-04 PROCEDURE — 99214 OFFICE O/P EST MOD 30 MIN: CPT | Mod: S$PBB,,, | Performed by: INTERNAL MEDICINE

## 2022-10-04 PROCEDURE — 83036 HEMOGLOBIN GLYCOSYLATED A1C: CPT | Performed by: INTERNAL MEDICINE

## 2022-10-04 PROCEDURE — 99999 PR PBB SHADOW E&M-EST. PATIENT-LVL V: ICD-10-PCS | Mod: PBBFAC,,, | Performed by: INTERNAL MEDICINE

## 2022-10-04 PROCEDURE — 36415 COLL VENOUS BLD VENIPUNCTURE: CPT | Mod: PO | Performed by: UROLOGY

## 2022-10-04 NOTE — PROGRESS NOTES
Subjective:       Patient ID: Lukasz Person is a 67 y.o. male.    Chief Complaint: Follow-up    HPI    67-year-old male here for 6 month follow-up.  He had the arhythmia back after the kidney stone.  It has gotten better after the heat relented.    Between 2-5 pm, he gets really tired.  He gets shaky and weak.  He has found that if he has breakfast and takes his medications and goes for a long walk, he is 230 after showering.  This does not happen everyday he exercises.  He can sit in a chair and fall asleep.  He feels ok after the nap.  The times he gets the shakiness, he gets bad diarrhea.  It is liquid and everything comes out.  He is not checking his BG when he is in this situation.  He is using his CPAP consistently.  He does not feel like he has not slept at all.  He is not as refreshed lately when he wakes up.  He is still waiting on a new CPAP machine 6-8 months.  He has an appointment next week with someone.      He is experiencing pain in his neck periodically.  He has trouble swallowing about the last month.  He does not have trouble with liquids.  If he is in this situation, he cannot swallow liquids when he is in this situation.  He had trouble with grits and eggs and bread.    He just saw EP yesterday.  He stopped the amiodarone and was put on a cardiac monitor.  He is on metoprolol.  He sent a note to cardiology about what dose of metoprolol he should take.    Diabetes-Amaryl 4 mg, metformin 500 mg b.i.d..  He checks his BG.  They run low 100s 106-114 consistently.  Lab Results   Component Value Date    HGBA1C 6.8 (H) 05/02/2022    HGBA1C 7.2 (H) 02/17/2022    HGBA1C 7.0 (H) 10/06/2021     Lab Results   Component Value Date    LDLCALC 30.2 (L) 03/22/2022    CREATININE 1.6 (H) 09/07/2022     HTN -  Patient's co morbidities include:  Diabetes. Patient is currently on Toprol XL 25 mg, lisinopril-HCTZ 20-25 mg. He does not check his BP at home. Side effects of medications note: none. Denies headaches,  blurred vision, chest pain, shortness of breath, nausea.    HLD - Patient is currently on crestor 20 mg.  His last lipid panel was   Cholesterol   Date Value Ref Range Status   03/22/2022 94 (L) 120 - 199 mg/dL Final     Comment:     The National Cholesterol Education Program (NCEP) has set the  following guidelines (reference ranges) for Cholesterol:  Optimal.....................<200 mg/dL  Borderline High.............200-239 mg/dL  High........................> or = 240 mg/dL       Triglycerides   Date Value Ref Range Status   03/22/2022 109 30 - 150 mg/dL Final     Comment:     The National Cholesterol Education Program (NCEP) has set the  following guidelines (reference values) for triglycerides:  Normal......................<150 mg/dL  Borderline High.............150-199 mg/dL  High........................200-499 mg/dL       HDL   Date Value Ref Range Status   03/22/2022 42 40 - 75 mg/dL Final     Comment:     The National Cholesterol Education Program (NCEP) has set the  following guidelines (reference values) for HDL Cholesterol:  Low...............<40 mg/dL  Optimal...........>60 mg/dL       LDL Cholesterol   Date Value Ref Range Status   03/22/2022 30.2 (L) 63.0 - 159.0 mg/dL Final     Comment:     The National Cholesterol Education Program (NCEP) has set the  following guidelines (reference values) for LDL Cholesterol:  Optimal.......................<130 mg/dL  Borderline High...............130-159 mg/dL  High..........................160-189 mg/dL  Very High.....................>190 mg/dL     .  Side effects of the medication: none.    Patient has paroxysmal atrial fibrillation and is on Toprol-XL 25 mg, Xarelto 20 mg.    Patient has heart failure with preserved ejection fraction, and is on no diuretics at this time.    Patient has low testosterone and is on AndroGel.    Review of Systems      Objective:      Physical Exam  Vitals reviewed.   Constitutional:       Appearance: He is well-developed.   HENT:       Head: Normocephalic and atraumatic.      Mouth/Throat:      Pharynx: No oropharyngeal exudate.   Eyes:      General: No scleral icterus.        Right eye: No discharge.         Left eye: No discharge.      Pupils: Pupils are equal, round, and reactive to light.   Neck:      Thyroid: No thyromegaly.      Trachea: No tracheal deviation.   Cardiovascular:      Rate and Rhythm: Normal rate and regular rhythm.      Heart sounds: Normal heart sounds. No murmur heard.    No friction rub. No gallop.   Pulmonary:      Effort: Pulmonary effort is normal. No respiratory distress.      Breath sounds: Normal breath sounds. No wheezing or rales.   Chest:      Chest wall: No tenderness.   Abdominal:      General: Bowel sounds are normal. There is no distension.      Palpations: Abdomen is soft. There is no mass.      Tenderness: There is no abdominal tenderness. There is no guarding or rebound.   Musculoskeletal:         General: No tenderness. Normal range of motion.      Cervical back: Normal range of motion and neck supple.   Skin:     General: Skin is warm and dry.      Coloration: Skin is not pale.      Findings: No erythema or rash.   Neurological:      Mental Status: He is alert and oriented to person, place, and time.   Psychiatric:         Behavior: Behavior normal.       Assessment:       1. Type 2 diabetes mellitus with stage 3 chronic kidney disease, without long-term current use of insulin, unspecified whether stage 3a or 3b CKD  - Hemoglobin A1C; Future    2. Hypertension associated with diabetes    3. Hyperlipidemia associated with type 2 diabetes mellitus    4. Coronary artery disease involving native coronary artery of native heart without angina pectoris    5. Paroxysmal atrial fibrillation    6. HFrEF (heart failure with reduced ejection fraction)    7. Non-ischemic cardiomyopathy    8. KUMAR on CPAP    9. Dysphagia, unspecified type  - Ambulatory referral/consult to Endo Procedure ; Future    10.  Weakness  - Comprehensive Metabolic Panel; Future    Plan:       1. Continue metformin 500 mg b.i.d., Amaryl 2 mg daily.  Check A1c.  2.  Continue Toprol-XL 25 mg.  3.  Continue Crestor 20 mg.  4.  Continue Crestor 20 mg.  5.  Continue Toprol-XL 25 mg.  6.  Monitor.    7.  Continue to monitor.  8. Follow-up with sleep medicine about CPAP machine.    9.  Refer for endoscopy.  10.  Check CMP.  This sounds like it could be hypoglycemia.  Patient counseled to check blood sugars when these events happen.  If low, would discontinue Amaryl, and change to a different medication.

## 2022-10-13 ENCOUNTER — OFFICE VISIT (OUTPATIENT)
Dept: UROLOGY | Facility: CLINIC | Age: 67
End: 2022-10-13
Payer: MEDICARE

## 2022-10-13 VITALS
HEIGHT: 72 IN | SYSTOLIC BLOOD PRESSURE: 134 MMHG | DIASTOLIC BLOOD PRESSURE: 66 MMHG | BODY MASS INDEX: 36.15 KG/M2 | HEART RATE: 56 BPM

## 2022-10-13 DIAGNOSIS — E29.1 MALE HYPOGONADISM: Chronic | ICD-10-CM

## 2022-10-13 DIAGNOSIS — N40.1 BPH WITH OBSTRUCTION/LOWER URINARY TRACT SYMPTOMS: Primary | ICD-10-CM

## 2022-10-13 DIAGNOSIS — N13.8 BPH WITH OBSTRUCTION/LOWER URINARY TRACT SYMPTOMS: Primary | ICD-10-CM

## 2022-10-13 PROCEDURE — 99999 PR PBB SHADOW E&M-EST. PATIENT-LVL III: ICD-10-PCS | Mod: PBBFAC,,, | Performed by: NURSE PRACTITIONER

## 2022-10-13 PROCEDURE — 99214 OFFICE O/P EST MOD 30 MIN: CPT | Mod: S$PBB,,, | Performed by: NURSE PRACTITIONER

## 2022-10-13 PROCEDURE — 99214 PR OFFICE/OUTPT VISIT, EST, LEVL IV, 30-39 MIN: ICD-10-PCS | Mod: S$PBB,,, | Performed by: NURSE PRACTITIONER

## 2022-10-13 PROCEDURE — 99999 PR PBB SHADOW E&M-EST. PATIENT-LVL III: CPT | Mod: PBBFAC,,, | Performed by: NURSE PRACTITIONER

## 2022-10-13 PROCEDURE — 99213 OFFICE O/P EST LOW 20 MIN: CPT | Mod: PBBFAC | Performed by: NURSE PRACTITIONER

## 2022-10-13 RX ORDER — TAMSULOSIN HYDROCHLORIDE 0.4 MG/1
0.4 CAPSULE ORAL DAILY
Qty: 30 CAPSULE | Refills: 11 | Status: SHIPPED | OUTPATIENT
Start: 2022-10-13 | End: 2023-10-03

## 2022-10-13 RX ORDER — TESTOSTERONE 20.25 MG/1.25G
GEL TOPICAL
Qty: 2 EACH | Refills: 5 | Status: SHIPPED | OUTPATIENT
Start: 2022-10-13 | End: 2023-06-27 | Stop reason: SDUPTHER

## 2022-10-13 RX ORDER — TESTOSTERONE 20.25 MG/1.25G
GEL TOPICAL
Qty: 2 EACH | Refills: 5 | Status: SHIPPED | OUTPATIENT
Start: 2022-10-13 | End: 2022-10-13
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
## 2022-10-18 ENCOUNTER — OFFICE VISIT (OUTPATIENT)
Dept: NEPHROLOGY | Facility: CLINIC | Age: 67
End: 2022-10-18
Payer: MEDICARE

## 2022-10-18 VITALS
HEIGHT: 73 IN | SYSTOLIC BLOOD PRESSURE: 147 MMHG | HEART RATE: 67 BPM | WEIGHT: 267.19 LBS | DIASTOLIC BLOOD PRESSURE: 73 MMHG | BODY MASS INDEX: 35.41 KG/M2 | OXYGEN SATURATION: 95 %

## 2022-10-18 DIAGNOSIS — E11.59 HYPERTENSION ASSOCIATED WITH DIABETES: Chronic | ICD-10-CM

## 2022-10-18 DIAGNOSIS — E11.9 DM TYPE 2 WITHOUT RETINOPATHY: Chronic | ICD-10-CM

## 2022-10-18 DIAGNOSIS — N18.30 TYPE 2 DIABETES MELLITUS WITH STAGE 3 CHRONIC KIDNEY DISEASE, WITHOUT LONG-TERM CURRENT USE OF INSULIN, UNSPECIFIED WHETHER STAGE 3A OR 3B CKD: Chronic | ICD-10-CM

## 2022-10-18 DIAGNOSIS — N18.30 CKD STAGE 3 DUE TO TYPE 2 DIABETES MELLITUS: ICD-10-CM

## 2022-10-18 DIAGNOSIS — N20.0 NEPHROLITHIASIS: Primary | ICD-10-CM

## 2022-10-18 DIAGNOSIS — E11.22 TYPE 2 DIABETES MELLITUS WITH STAGE 3 CHRONIC KIDNEY DISEASE, WITHOUT LONG-TERM CURRENT USE OF INSULIN, UNSPECIFIED WHETHER STAGE 3A OR 3B CKD: Chronic | ICD-10-CM

## 2022-10-18 DIAGNOSIS — I15.2 HYPERTENSION ASSOCIATED WITH DIABETES: Chronic | ICD-10-CM

## 2022-10-18 DIAGNOSIS — E11.69 HYPERLIPIDEMIA ASSOCIATED WITH TYPE 2 DIABETES MELLITUS: Chronic | ICD-10-CM

## 2022-10-18 DIAGNOSIS — E11.22 CKD STAGE 3 DUE TO TYPE 2 DIABETES MELLITUS: ICD-10-CM

## 2022-10-18 DIAGNOSIS — Z12.11 ENCOUNTER FOR SCREENING COLONOSCOPY FOR NON-HIGH-RISK PATIENT: Primary | ICD-10-CM

## 2022-10-18 DIAGNOSIS — E78.5 HYPERLIPIDEMIA ASSOCIATED WITH TYPE 2 DIABETES MELLITUS: Chronic | ICD-10-CM

## 2022-10-18 DIAGNOSIS — E21.0 HYPERPARATHYROIDISM, PRIMARY: ICD-10-CM

## 2022-10-18 DIAGNOSIS — I25.10 CORONARY ARTERY DISEASE INVOLVING NATIVE CORONARY ARTERY OF NATIVE HEART WITHOUT ANGINA PECTORIS: Chronic | ICD-10-CM

## 2022-10-18 PROCEDURE — 99215 OFFICE O/P EST HI 40 MIN: CPT | Mod: S$PBB,,, | Performed by: HOSPITALIST

## 2022-10-18 PROCEDURE — 99213 OFFICE O/P EST LOW 20 MIN: CPT | Mod: PBBFAC | Performed by: HOSPITALIST

## 2022-10-18 PROCEDURE — 99999 PR PBB SHADOW E&M-EST. PATIENT-LVL III: ICD-10-PCS | Mod: PBBFAC,,, | Performed by: HOSPITALIST

## 2022-10-18 PROCEDURE — 99215 PR OFFICE/OUTPT VISIT, EST, LEVL V, 40-54 MIN: ICD-10-PCS | Mod: S$PBB,,, | Performed by: HOSPITALIST

## 2022-10-18 PROCEDURE — 99999 PR PBB SHADOW E&M-EST. PATIENT-LVL III: CPT | Mod: PBBFAC,,, | Performed by: HOSPITALIST

## 2022-10-18 NOTE — PROGRESS NOTES
REASON FOR CONSULT/CHIEF COMPLAIN:  H/o Chronic kidney disease stage 3    REFERRING PHYSICIAN: Pallavi Nick    This is a new clinic patient to me .      HISTORY OF PRESENT ILLNESS: 67 y.o. male who is established to me  has a past medical history of Anticoagulant long-term use, Diabetes mellitus, Hyperlipidemia, Hypertension, Kidney stones, and Sleep apnea. H/o kidney stones in the past, hyperparathyroidism s/p parathyroidectomy, gout, KUMAR referred here for abnormal renal function with creatinine at 1.6 and CKD stage 3 A. Pt has been following with Nephrologist at [place] , mentioned has calcium oxalate stones in the past and since his parathyroidectomy ( one of 4 glands removed), and being on potassium citrate he never had any episode since past 3 years. Pt has been compliant with drinking good amount of water, 64 ounces per day.Has been Diabetic since past 10 years  And well controlled. Recent urine micro albumin checked was with in normal limits. Pt already on ACE for his blood pressure. Also had elevated uric acid for which he is on allopurinol.    Today he mentions of not taking his blood pressure pills this AM and his pressure is slightly high.   No chronic NSAID use, no known exposure to lithium, lead.  Denied any issues with urination including dysuria, hematuria, urgency, hesitancy, nocturia, incomplete voiding. Denied chest pain, nausea, vomiting, abd pain, nausea, vomiting, diarrhea, shortness of breath, pedal edema, Orthopnea, PND.  Home Blood pressures are checked/not checked and stay around  130 to 135 systolic and 80 diastolic   Home Blood sugars are checked/not checked and well controlled.     Interval H/o  9/13   Pt mentions of hospitalization in Feb with fevers, chills , pyelonephritis, and kidney stone with s/p L nephrolithotripsy and ureteral stent placement on 2/25 also had  N/V, and left-sided flank pain. UA consistent with UTI.  Blood cultures negative. Pt received Rocephin IV. His stent  was removed late March 2022. Repeat renal ultrasound with right small stone with no hydronephrosis or obstruction.     Interval H/o 10/18   No recent kidney stone episodes. Denies nausea, vomiting, flank pain . Continues  to take potassium citrate 10 meq TID. Stone analysis with calcium oxide monohydrate stones.       ROS:  General: negative for chills, or fatigue  ENT: No epistaxis or headaches  Hematological and Lymphatic: No bleeding problems or blood clots.  Endocrine: No skin changes or temperature intolerance  Respiratory: No cough, shortness of breath, or wheezing  Cardiovascular: No chest pain or dyspnea   Gastrointestinal: No abdominal pain, change in bowel habits  Genito-Urinary: No dysuria, trouble voiding, or hematuria  Musculoskeletal: ROS: negative for - joint pain, joint stiffness, joint swelling, muscle pain or muscular weakness  Neurological: No new focal weakness, no numbness  Dermatological: No rash or ulcers.    PAST MEDICAL HISTORY:  Past Medical History:   Diagnosis Date    Anticoagulant long-term use     Diabetes mellitus     Onset late 50s/early 60s    Hyperlipidemia     Hypertension     Onset late 50s/early 60s    Kidney stones     Sleep apnea     since 2006       PAST SURGICAL HISTORY:  Past Surgical History:   Procedure Laterality Date    CORONARY ANGIOGRAPHY N/A 9/30/2020    Procedure: ANGIOGRAM, CORONARY ARTERY;  Surgeon: John West MD;  Location: [facility] CATH LAB;  Service: Cardiology;  Laterality: N/A;    CYSTOSCOPY N/A 2/17/2022    Procedure: CYSTOSCOPY;  Surgeon: Lukasz Hughes MD;  Location: [address];  Service: Urology;  Laterality: N/A;    CYSTOSCOPY W/ URETERAL STENT PLACEMENT N/A 2/25/2022    Procedure: CYSTOSCOPY, WITH URETERAL STENT INSERTION;  Surgeon: Lukasz Hughes MD;  Location: [facility] OR [address] [place] [address];  Service: Urology;  Laterality: N/A;    LASER LITHOTRIPSY Left 2/25/2022    Procedure: LITHOTRIPSY, USING LASER;  Surgeon: Lukasz Hughes MD;  [facility];  Service: Urology;  Laterality: Left;    LEFT HEART CATHETERIZATION Left 9/30/2020    Procedure: Left heart cath;  Surgeon: John West MD;  Location: [facility] CATH LAB;  Service: Cardiology;  Laterality: Left;    LITHOTRIPSY      PARATHYROIDECTOMY  1/1/2-107    PYELOSCOPY Left 2/25/2022    Procedure: PYELOSCOPY;  Surgeon: Lukasz Hughes MD;  Location: [address];  Service: Urology;  Laterality: Left;    TRANSESOPHAGEAL ECHOCARDIOGRAPHY N/A 4/7/2022    Procedure: ECHOCARDIOGRAM, TRANSESOPHAGEAL;  Surgeon: Xander Diagnostic Provider;  Location: [facility] EP LAB;  Service: Cardiology;  Laterality: N/A;    TREATMENT OF CARDIAC ARRHYTHMIA N/A 4/7/2022    Procedure: Cardioversion or Defibrillation;  Surgeon: Iris Fisher NP;  Location: [facility] EP LAB;  Service: Cardiology;  Laterality: N/A;  afib, dccv, artie, anes, EH, 3prep    URETEROSCOPIC REMOVAL OF URETERIC CALCULUS Left 2/25/2022    Procedure: REMOVAL, CALCULUS, URETER, URETEROSCOPIC;  Surgeon: Lukasz Hughes MD;  Location: [address];  Service: Urology;  Laterality: Left;    URETEROSCOPY Left 2/25/2022    Procedure: URETEROSCOPY;  Surgeon: Lukasz Hughes MD;  Location: [address];  Service: Urology;  Laterality: Left;       FAMILY HISTORY:   Family History   Problem Relation Age of Onset    Arthritis Mother     Heart disease Father 70        CABG    Arthritis Father     Diabetes Father     Kidney disease Father         had one kidney removed in early thirties    Arthritis Sister     Arthritis Sister     Hypertension Sister     Colon cancer Brother 32    Arthritis Brother     Alcohol abuse Paternal Aunt     Cancer Maternal Grandfather         throat cancer    Diabetes Paternal Grandmother     Colon polyps Paternal Grandfather     Colon cancer Paternal Grandfather     Cancer Paternal Grandfather         colon cancer at age 62       SOCIAL HISTORY:  Social History     Socioeconomic History    Marital status:    Tobacco  Use    Smoking status: Former     Packs/day: 1.00     Years: 7.00     Pack years: 7.00     Types: Cigarettes, Cigars     Start date: 1972     Quit date:      Years since quittin.4    Smokeless tobacco: Never    Tobacco comments:     smoking was off and on.  cumulative 7 years.  never more than three years in one stretch or more lj   Substance and Sexual Activity    Alcohol use: Yes     Alcohol/week: 3.0 standard drinks     Types: 2 Cans of beer, 1 Shots of liquor per week     Comment: don't drink regularly.  6 to 7 monthly    Drug use: Not Currently     Types: Marijuana    Sexual activity: Not Currently     Partners: Female     Birth control/protection: None     Comment:      Social Determinants of Health     Financial Resource Strain: Low Risk     Difficulty of Paying Living Expenses: Not hard at all   Food Insecurity: No Food Insecurity    Worried About Running Out of Food in the Last Year: Never true    Ran Out of Food in the Last Year: Never true   Transportation Needs: No Transportation Needs    Lack of Transportation (Medical): No    Lack of Transportation (Non-Medical): No   Physical Activity: Sufficiently Active    Days of Exercise per Week: 5 days    Minutes of Exercise per Session: 40 min   Stress: No Stress Concern Present    Feeling of Stress : Only a little   Social Connections: Unknown    Frequency of Communication with Friends and Family: Once a week    Frequency of Social Gatherings with Friends and Family: Once a week    Active Member of Clubs or Organizations: Yes    Attends Club or Organization Meetings: More than 4 times per year    Marital Status:    Housing Stability: Low Risk     Unable to Pay for Housing in the Last Year: No    Number of Places Lived in the Last Year: 1    Unstable Housing in the Last Year: No       ALLERGIES:  Review of patient's allergies indicates:  No Known Allergies    MEDICATIONS:    Current Outpatient Medications:     ACCU-CHEK SOFTCLIX  LANCETS Misc, USE EVERY DAY, Disp: 100 each, Rfl: 6    allopurinoL (ZYLOPRIM) 100 MG tablet, TAKE 1 TABLET BY MOUTH EVERY DAY, Disp: 30 tablet, Rfl: 10    blood sugar diagnostic (ACCU-CHEK ANTONELLA PLUS TEST STRP) Strp, Inject 1 strip into the skin 2 (two) times daily before meals., Disp: 100 strip, Rfl: 11    blood-glucose meter kit, Checks blood sugars 1x/daily., Disp: 1 each, Rfl: 12    glimepiride (AMARYL) 2 MG tablet, TAKE 2 TABLETS BY MOUTH EVERY MORNING, Disp: 60 tablet, Rfl: 9    lisinopriL-hydrochlorothiazide (PRINZIDE,ZESTORETIC) 20-25 mg Tab, TAKE 1 TABLET BY MOUTH EVERY DAY, Disp: 90 tablet, Rfl: 1    metFORMIN (GLUCOPHAGE) 500 MG tablet, TAKE 2 TABLETS BY MOUTH EVERY MORNING AND 2 TABLETS WITH DINNER, Disp: 120 tablet, Rfl: 10    metoprolol succinate (TOPROL-XL) 25 MG 24 hr tablet, Take 1 tablet (25 mg total) by mouth once daily., Disp: 30 tablet, Rfl: 11    nitroGLYCERIN (NITROSTAT) 0.4 MG SL tablet, Place 1 tablet (0.4 mg total) under the tongue every 5 (five) minutes as needed for Chest pain. If you need a third tablet, call 911, Disp: 60 tablet, Rfl: 12    potassium citrate (UROCIT-K) 10 mEq (1,080 mg) TbSR, Take 1 tablet (10 mEq total) by mouth 3 (three) times daily with meals., Disp: 90 tablet, Rfl: 9    rivaroxaban (XARELTO) 20 mg Tab, Take 1 tablet (20 mg total) by mouth daily with dinner or evening meal., Disp: 30 tablet, Rfl: 11    rosuvastatin (CRESTOR) 20 MG tablet, TAKE 1 TABLET(20 MG) BY MOUTH EVERY DAY, Disp: 30 tablet, Rfl: 11    tamsulosin (FLOMAX) 0.4 mg Cap, Take 1 capsule (0.4 mg total) by mouth once daily., Disp: 30 capsule, Rfl: 11    testosterone (ANDROGEL) 20.25 mg/1.25 gram (1.62 %) GlPm, Apply 4 pumps to shoulders daily, Disp: 2 each, Rfl: 5   Medication List with Changes/Refills   Current Medications    ACCU-CHEK SOFTCLIX LANCETS MISC    USE EVERY DAY    ALLOPURINOL (ZYLOPRIM) 100 MG TABLET    TAKE 1 TABLET BY MOUTH EVERY DAY    BLOOD SUGAR DIAGNOSTIC (ACCU-CHEK ANTONELLA PLUS TEST  "STRP) STRP    Inject 1 strip into the skin 2 (two) times daily before meals.    BLOOD-GLUCOSE METER KIT    Checks blood sugars 1x/daily.    GLIMEPIRIDE (AMARYL) 2 MG TABLET    TAKE 2 TABLETS BY MOUTH EVERY MORNING    LISINOPRIL-HYDROCHLOROTHIAZIDE (PRINZIDE,ZESTORETIC) 20-25 MG TAB    TAKE 1 TABLET BY MOUTH EVERY DAY    METFORMIN (GLUCOPHAGE) 500 MG TABLET    TAKE 2 TABLETS BY MOUTH EVERY MORNING AND 2 TABLETS WITH DINNER    METOPROLOL SUCCINATE (TOPROL-XL) 25 MG 24 HR TABLET    Take 1 tablet (25 mg total) by mouth once daily.    NITROGLYCERIN (NITROSTAT) 0.4 MG SL TABLET    Place 1 tablet (0.4 mg total) under the tongue every 5 (five) minutes as needed for Chest pain. If you need a third tablet, call 911    POTASSIUM CITRATE (UROCIT-K) 10 MEQ (1,080 MG) TBSR    Take 1 tablet (10 mEq total) by mouth 3 (three) times daily with meals.    RIVAROXABAN (XARELTO) 20 MG TAB    Take 1 tablet (20 mg total) by mouth daily with dinner or evening meal.    ROSUVASTATIN (CRESTOR) 20 MG TABLET    TAKE 1 TABLET(20 MG) BY MOUTH EVERY DAY    TAMSULOSIN (FLOMAX) 0.4 MG CAP    Take 1 capsule (0.4 mg total) by mouth once daily.    TESTOSTERONE (ANDROGEL) 20.25 MG/1.25 GRAM (1.62 %) GLPM    Apply 4 pumps to shoulders daily        PHYSICAL EXAM:  BP (!) 147/73 (BP Location: Right arm, Patient Position: Sitting)   Pulse 67   Ht 6' 1" (1.854 m)   Wt 121.2 kg (267 lb 3.2 oz)   SpO2 95%   BMI 35.25 kg/m²     General: No distress,  Moderate build ,No fever or chills  Head: Normocephalic,atraumatic  Eyes: conjunctivae/corneas clear. PERRL, EOM's intact.  Nose: Nares normal. Mucosa normal. No drainage or sinus tenderness.  Neck: No adenopathy,no carotid bruit,no JVD  Lungs:Clear to auscultation bilaterally, No Crackles  Heart: Regular rate and rhythm, no murmur, gallops or rubs  Abdomen: Soft, no tenderness, bowel sounds normal  Extremities: Atraumatic, no edema in LE  Skin: Turgor normal. No rashes or ulcers  Neurologic: No focal weakness, " oriented.  Dialysis Access: none         LABS:  Lab Results   Component Value Date    HGB 14.5 09/07/2022        Lab Results   Component Value Date    CREATININE 1.5 (H) 10/04/2022       Prot/Creat Ratio, Urine   Date Value Ref Range Status   10/04/2022 Unable to calculate 0.00 - 0.20 Final   09/07/2022 0.10 0.00 - 0.20 Final   09/15/2020 0.12 0.00 - 0.20 Final       Lab Results   Component Value Date     10/04/2022    K 4.6 10/04/2022    CO2 29 10/04/2022       last PTH   Lab Results   Component Value Date    PTH 25.4 02/15/2022    CALCIUM 9.6 10/04/2022    PHOS 2.7 09/07/2022       Lab Results   Component Value Date    HGBA1C 6.4 (H) 10/04/2022       Lab Results   Component Value Date    LDLCALC 16.2 (L) 10/04/2022         Creatinine, Urine   Date Value Ref Range Status   10/04/2022 35.0 23.0 - 375.0 mg/dL Final   09/07/2022 178.0 23.0 - 375.0 mg/dL Final   05/02/2022 100.0 23.0 - 375.0 mg/dL Final       Protein, Urine Random   Date Value Ref Range Status   10/04/2022 <7 0 - 15 mg/dL Final   09/07/2022 18 (H) 0 - 15 mg/dL Final   09/15/2020 14 0 - 15 mg/dL Final     Comment:     The random urine reference ranges provided were established   for 24 hour urine collections.  No reference ranges exist for  random urine specimens.  Correlate clinically.         Prot/Creat Ratio, Urine   Date Value Ref Range Status   10/04/2022 Unable to calculate 0.00 - 0.20 Final   09/07/2022 0.10 0.00 - 0.20 Final   09/15/2020 0.12 0.00 - 0.20 Final          Problem List   Chronic kidney disease stage 3A  Diabetes Mellitus   Essential hypertension  Hyperlipidemia  H/o kidney stones     ASSESSMENT and Plan      CKD stage 3 A secondary to parenchymal damage from multiple nephrolithiasis  -Baseline Creatinine is around 1.5 to 1.6, and is stable currently at 1.6 mg/dl  -Recent stone episode with s/p L nephrolithotripsy and ureteral stent placement on 2/25 with UTI and perinephric stranding  - S/p stent removal March 2022  -Stone  analysis with 100 % calcium oxalate monohydrate    On Potassium citrate 10 meq BID   Urine ph at 5   Would increase potassium citrate to 10 meq TID and consider 24 hour urine for stone analysis next visit.  -Most likely etiology for CKD is  DM, HTN, arterial atherosclerosis  Urine micro albumin to creatinine ratio is with in limits   Electrolytes, Acid Base, Hemoglobin, Bone Mineral in acceptable range.   Uric acid and PTH with in limits   S/p parathyroidectomy.  Avoid NSAIDs intake   Also noted he is on Fenofibrate along with statin. Discussed with his PCP and given his Lipid panel with in limits and cases of fenofibrate causing ABDI, will hold off on Fenofibrate currently.    Diabetes Mellitus   Continue current medications      Essential Hypertension   Blood pressures elevated at clinic , m,entioned of not taking his pill this AM   Usually are well controlled   Encouraged home Bp monitor    -Continue current BP meds regimen  -Avoid NSAIDs intake     H/o kidney stones    S/p Parathyroidectomy    Continue Potassium citrate  to 10 meq TID    No recent stones episodes since 2/2022   Checking 24 hour urine for stone risk profile   Pt is also on HCTZ. Will re evaluate based on 24 hour urine values.     H/o Gout   On allopurinol   No arthritis   Will check Uric acid levels next visit    - General risk factors for kidney stones and the conservative measures to prevent kidney stones in the future were discussed with the patient in detail.  The patient was encouraged to drink 2-3 liters of water a day, limit iced tea and pasquale as well as foods high in oxalate.  They were cautioned to try to limit salt and red meat intake.  We also discussed adding citrate to the diet with the addition of atul or lemon juice to their water or alternatively with crystal light.      46 minutes of total time spent on the encounter, which includes face to face time and non-face to face time preparing to see the patient (eg, review of tests),  Obtaining and/or reviewing separately obtained history, documenting clinical information in the electronic or other health record, independently interpreting results (not separately reported) and communicating results to the patient/family/caregiver, or Care coordination (not separately reported).       RTC in 4 months     Diagnosis and plan of care explained to the patient.  Pt Verbalized understanding. Answered all questions.  Thanks for allowing me to participate in the care of this patient.       Karol Oconnell MD  Nephrology  [facility].

## 2022-10-27 ENCOUNTER — PATIENT MESSAGE (OUTPATIENT)
Dept: INTERNAL MEDICINE | Facility: CLINIC | Age: 67
End: 2022-10-27
Payer: MEDICARE

## 2022-10-27 NOTE — TELEPHONE ENCOUNTER
Attempted to call patient.  No answer.  Left voicemail for patient to call back.  Will also send a portal message

## 2022-10-27 NOTE — TELEPHONE ENCOUNTER
Pt states he has been having low glucose levels even when eating full meals, feels shaky and have to sit down, he has cut the daily dose of glimiperide in half.

## 2022-10-28 ENCOUNTER — PATIENT MESSAGE (OUTPATIENT)
Dept: INTERNAL MEDICINE | Facility: CLINIC | Age: 67
End: 2022-10-28
Payer: MEDICARE

## 2022-10-28 ENCOUNTER — TELEPHONE (OUTPATIENT)
Dept: DIABETES | Facility: CLINIC | Age: 67
End: 2022-10-28
Payer: MEDICARE

## 2022-11-02 ENCOUNTER — CLINICAL SUPPORT (OUTPATIENT)
Dept: ENDOSCOPY | Facility: HOSPITAL | Age: 67
End: 2022-11-02
Attending: INTERNAL MEDICINE
Payer: MEDICARE

## 2022-11-02 ENCOUNTER — PATIENT MESSAGE (OUTPATIENT)
Dept: CARDIOLOGY | Facility: CLINIC | Age: 67
End: 2022-11-02
Payer: MEDICARE

## 2022-11-02 ENCOUNTER — TELEPHONE (OUTPATIENT)
Dept: ENDOSCOPY | Facility: HOSPITAL | Age: 67
End: 2022-11-02

## 2022-11-02 DIAGNOSIS — R13.10 DYSPHAGIA, UNSPECIFIED TYPE: ICD-10-CM

## 2022-11-02 NOTE — TELEPHONE ENCOUNTER
Emanuel Thorpe    Pt has order [place]. Can he hold Xarelto x 2 days as per Endoscopy protocol? Please advise.   Thanks   Gloria HERNANDEZ.

## 2022-11-03 NOTE — TELEPHONE ENCOUNTER
Hi Dr Kirk Wilson,    Pt has order for EGD. Can he hold Xarelto x 2 days as per Endoscopy protocol? Please advise.     Thanks   Gloria HERNANDEZ.

## 2022-11-04 ENCOUNTER — TELEPHONE (OUTPATIENT)
Dept: ENDOSCOPY | Facility: HOSPITAL | Age: 67
End: 2022-11-04
Payer: MEDICARE

## 2022-11-04 NOTE — TELEPHONE ENCOUNTER
Kirk Wilson MD 2 hours ago (7:36 AM)     Okay to hold Xarelto for 2 days prior to scope.         Note      You routed conversation to Kirk Wilson MD 17 hours ago (4:59 PM)     You 17 hours ago (4:58 PM)     [facility] Dr Kirk Wilson,     Pt has order for EGD. Can he hold Xarelto x 2 days as per Endoscopy protocol? Please advise.      Thanks   Gloria HERNANDEZ.

## 2022-11-07 ENCOUNTER — TELEPHONE (OUTPATIENT)
Dept: ENDOSCOPY | Facility: HOSPITAL | Age: 67
End: 2022-11-07
Payer: MEDICARE

## 2022-11-07 ENCOUNTER — PATIENT MESSAGE (OUTPATIENT)
Dept: INTERNAL MEDICINE | Facility: CLINIC | Age: 67
End: 2022-11-07
Payer: MEDICARE

## 2022-11-07 ENCOUNTER — LAB VISIT (OUTPATIENT)
Dept: LAB | Facility: HOSPITAL | Age: 67
End: 2022-11-07
Payer: MEDICARE

## 2022-11-07 ENCOUNTER — PATIENT MESSAGE (OUTPATIENT)
Dept: CARDIOLOGY | Facility: CLINIC | Age: 67
End: 2022-11-07
Payer: MEDICARE

## 2022-11-07 DIAGNOSIS — I25.10 CORONARY ARTERY DISEASE INVOLVING NATIVE CORONARY ARTERY OF NATIVE HEART WITHOUT ANGINA PECTORIS: ICD-10-CM

## 2022-11-07 DIAGNOSIS — E11.21 TYPE 2 DIABETES MELLITUS WITH DIABETIC NEPHROPATHY, WITHOUT LONG-TERM CURRENT USE OF INSULIN: ICD-10-CM

## 2022-11-07 DIAGNOSIS — Z79.899 LONG TERM CURRENT USE OF AMIODARONE: ICD-10-CM

## 2022-11-07 LAB
ALBUMIN SERPL BCP-MCNC: 4 G/DL (ref 3.5–5.2)
ALP SERPL-CCNC: 50 U/L (ref 55–135)
ALT SERPL W/O P-5'-P-CCNC: 23 U/L (ref 10–44)
ANION GAP SERPL CALC-SCNC: 9 MMOL/L (ref 8–16)
AST SERPL-CCNC: 26 U/L (ref 10–40)
BILIRUB SERPL-MCNC: 0.4 MG/DL (ref 0.1–1)
BUN SERPL-MCNC: 17 MG/DL (ref 8–23)
CALCIUM SERPL-MCNC: 9.4 MG/DL (ref 8.7–10.5)
CHLORIDE SERPL-SCNC: 104 MMOL/L (ref 95–110)
CHOLEST SERPL-MCNC: 107 MG/DL (ref 120–199)
CHOLEST/HDLC SERPL: 3.1 {RATIO} (ref 2–5)
CO2 SERPL-SCNC: 28 MMOL/L (ref 23–29)
CREAT SERPL-MCNC: 1.5 MG/DL (ref 0.5–1.4)
ERYTHROCYTE [DISTWIDTH] IN BLOOD BY AUTOMATED COUNT: 14.5 % (ref 11.5–14.5)
EST. GFR  (NO RACE VARIABLE): 50.4 ML/MIN/1.73 M^2
ESTIMATED AVG GLUCOSE: 151 MG/DL (ref 68–131)
GLUCOSE SERPL-MCNC: 147 MG/DL (ref 70–110)
HBA1C MFR BLD: 6.9 % (ref 4–5.6)
HCT VFR BLD AUTO: 47.3 % (ref 40–54)
HDLC SERPL-MCNC: 34 MG/DL (ref 40–75)
HDLC SERPL: 31.8 % (ref 20–50)
HGB BLD-MCNC: 14.7 G/DL (ref 14–18)
LDLC SERPL CALC-MCNC: 29.4 MG/DL (ref 63–159)
MAGNESIUM SERPL-MCNC: 2 MG/DL (ref 1.6–2.6)
MCH RBC QN AUTO: 29.6 PG (ref 27–31)
MCHC RBC AUTO-ENTMCNC: 31.1 G/DL (ref 32–36)
MCV RBC AUTO: 95 FL (ref 82–98)
NONHDLC SERPL-MCNC: 73 MG/DL
PLATELET # BLD AUTO: 163 K/UL (ref 150–450)
PMV BLD AUTO: 10.7 FL (ref 9.2–12.9)
POTASSIUM SERPL-SCNC: 5.1 MMOL/L (ref 3.5–5.1)
PROT SERPL-MCNC: 6.9 G/DL (ref 6–8.4)
RBC # BLD AUTO: 4.97 M/UL (ref 4.6–6.2)
SODIUM SERPL-SCNC: 141 MMOL/L (ref 136–145)
TRIGL SERPL-MCNC: 218 MG/DL (ref 30–150)
TSH SERPL DL<=0.005 MIU/L-ACNC: 2.54 UIU/ML (ref 0.4–4)
WBC # BLD AUTO: 5.57 K/UL (ref 3.9–12.7)

## 2022-11-07 PROCEDURE — 85027 COMPLETE CBC AUTOMATED: CPT | Performed by: NURSE PRACTITIONER

## 2022-11-07 PROCEDURE — 84443 ASSAY THYROID STIM HORMONE: CPT | Performed by: NURSE PRACTITIONER

## 2022-11-07 PROCEDURE — 80053 COMPREHEN METABOLIC PANEL: CPT | Performed by: NURSE PRACTITIONER

## 2022-11-07 PROCEDURE — 83735 ASSAY OF MAGNESIUM: CPT | Performed by: NURSE PRACTITIONER

## 2022-11-07 PROCEDURE — 80061 LIPID PANEL: CPT | Performed by: NURSE PRACTITIONER

## 2022-11-07 PROCEDURE — 83036 HEMOGLOBIN GLYCOSYLATED A1C: CPT | Performed by: NURSE PRACTITIONER

## 2022-11-07 PROCEDURE — 36415 COLL VENOUS BLD VENIPUNCTURE: CPT | Mod: PO | Performed by: NURSE PRACTITIONER

## 2022-11-07 NOTE — TELEPHONE ENCOUNTER
Pt calling to get schedule for his procedure  Pt states Rn told him to call back to see if his blood thinner hold was received and he can be schedule sooner , Pt has a PAT appt on 12/9  Pt requesting a call back

## 2022-11-07 NOTE — TELEPHONE ENCOUNTER
Called patient, no answer left message. Blood thinner hold request has been received and patient has PAT appointment for 12/9 currently.

## 2022-11-07 NOTE — TELEPHONE ENCOUNTER
Pt states his problems with swallowing has become worse, endoscopy has delayed scheduling because he is on blood thinners, they stated he needed to be cleared off of the medication for the procedure, but no one has called him. The next call is to be on 12/9.

## 2022-11-11 ENCOUNTER — TELEPHONE (OUTPATIENT)
Dept: ENDOSCOPY | Facility: HOSPITAL | Age: 67
End: 2022-11-11
Payer: MEDICARE

## 2022-11-11 ENCOUNTER — DOCUMENTATION ONLY (OUTPATIENT)
Dept: INTERNAL MEDICINE | Facility: CLINIC | Age: 67
End: 2022-11-11

## 2022-11-11 NOTE — TELEPHONE ENCOUNTER
Pt calling to get schedule for his procedure   Pt state she has a PAT appt on 12/9 and that's to long of a wait   Please call

## 2022-11-11 NOTE — TELEPHONE ENCOUNTER
MD Steve routed conversation to You 7 days ago     Kirk Wilson MD 7 days ago     Okay to hold Xarelto for 2 days prior to scope.         Note      You routed conversation to Kirk Wilson MD 8 days ago     You 8 days ago     PC  Hi Dr Kirk Wilson,     Pt has order for EGD. Can he hold Xarelto x 2 days as per Endoscopy protocol? Please advise.      Thanks   Gloria HERNANDEZ.

## 2022-11-13 ENCOUNTER — PATIENT MESSAGE (OUTPATIENT)
Dept: INTERNAL MEDICINE | Facility: CLINIC | Age: 67
End: 2022-11-13
Payer: MEDICARE

## 2022-11-14 ENCOUNTER — TELEPHONE (OUTPATIENT)
Dept: ENDOSCOPY | Facility: HOSPITAL | Age: 67
End: 2022-11-14
Payer: MEDICARE

## 2022-11-14 VITALS — WEIGHT: 263 LBS | BODY MASS INDEX: 34.85 KG/M2 | HEIGHT: 73 IN

## 2022-11-14 DIAGNOSIS — R13.10 DYSPHAGIA, UNSPECIFIED TYPE: Primary | ICD-10-CM

## 2022-11-15 ENCOUNTER — PATIENT MESSAGE (OUTPATIENT)
Dept: INTERNAL MEDICINE | Facility: CLINIC | Age: 67
End: 2022-11-15
Payer: MEDICARE

## 2022-11-15 NOTE — TELEPHONE ENCOUNTER
Pt states he has issues that can't wait for two months to get help with , appointment is made I will see if he will send through message.

## 2022-11-16 DIAGNOSIS — N20.0 NEPHROLITHIASIS: Primary | ICD-10-CM

## 2022-11-17 ENCOUNTER — ANESTHESIA EVENT (OUTPATIENT)
Dept: ENDOSCOPY | Facility: HOSPITAL | Age: 67
End: 2022-11-17
Payer: MEDICARE

## 2022-11-17 ENCOUNTER — ANESTHESIA (OUTPATIENT)
Dept: ENDOSCOPY | Facility: HOSPITAL | Age: 67
End: 2022-11-17
Payer: MEDICARE

## 2022-11-17 ENCOUNTER — HOSPITAL ENCOUNTER (OUTPATIENT)
Facility: HOSPITAL | Age: 67
Discharge: HOME | End: 2022-11-17
Attending: INTERNAL MEDICINE | Admitting: INTERNAL MEDICINE
Payer: MEDICARE

## 2022-11-17 VITALS
WEIGHT: 263 LBS | OXYGEN SATURATION: 99 % | BODY MASS INDEX: 34.85 KG/M2 | RESPIRATION RATE: 20 BRPM | HEART RATE: 55 BPM | HEIGHT: 73 IN | TEMPERATURE: 98 F | SYSTOLIC BLOOD PRESSURE: 134 MMHG | DIASTOLIC BLOOD PRESSURE: 65 MMHG

## 2022-11-17 DIAGNOSIS — R13.10 DYSPHAGIA: ICD-10-CM

## 2022-11-17 DIAGNOSIS — K21.00 GASTROESOPHAGEAL REFLUX DISEASE WITH ESOPHAGITIS WITHOUT HEMORRHAGE: Primary | ICD-10-CM

## 2022-11-17 LAB
POCT GLUCOSE: 122 MG/DL (ref 70–110)
POCT GLUCOSE: 136 MG/DL (ref 70–110)

## 2022-11-17 PROCEDURE — D9220A PRA ANESTHESIA: ICD-10-PCS | Mod: CRNA,,, | Performed by: NURSE ANESTHETIST, CERTIFIED REGISTERED

## 2022-11-17 PROCEDURE — 37000009 HC ANESTHESIA EA ADD 15 MINS: Performed by: INTERNAL MEDICINE

## 2022-11-17 PROCEDURE — 63600175 PHARM REV CODE 636 W HCPCS: Performed by: NURSE ANESTHETIST, CERTIFIED REGISTERED

## 2022-11-17 PROCEDURE — 25000003 PHARM REV CODE 250: Performed by: NURSE ANESTHETIST, CERTIFIED REGISTERED

## 2022-11-17 PROCEDURE — D9220A PRA ANESTHESIA: ICD-10-PCS | Mod: ANES,,, | Performed by: ANESTHESIOLOGY

## 2022-11-17 PROCEDURE — 37000008 HC ANESTHESIA 1ST 15 MINUTES: Performed by: INTERNAL MEDICINE

## 2022-11-17 PROCEDURE — 88305 TISSUE EXAM BY PATHOLOGIST: ICD-10-PCS | Mod: 26,,, | Performed by: PATHOLOGY

## 2022-11-17 PROCEDURE — 43239 EGD BIOPSY SINGLE/MULTIPLE: CPT | Mod: ,,, | Performed by: INTERNAL MEDICINE

## 2022-11-17 PROCEDURE — 82962 GLUCOSE BLOOD TEST: CPT | Performed by: INTERNAL MEDICINE

## 2022-11-17 PROCEDURE — D9220A PRA ANESTHESIA: Mod: ANES,,, | Performed by: ANESTHESIOLOGY

## 2022-11-17 PROCEDURE — 43239 EGD BIOPSY SINGLE/MULTIPLE: CPT | Performed by: INTERNAL MEDICINE

## 2022-11-17 PROCEDURE — 25000003 PHARM REV CODE 250: Performed by: INTERNAL MEDICINE

## 2022-11-17 PROCEDURE — 43239 PR EGD, FLEX, W/BIOPSY, SGL/MULTI: ICD-10-PCS | Mod: ,,, | Performed by: INTERNAL MEDICINE

## 2022-11-17 PROCEDURE — 88305 TISSUE EXAM BY PATHOLOGIST: CPT | Performed by: PATHOLOGY

## 2022-11-17 PROCEDURE — 88341 IMHCHEM/IMCYTCHM EA ADD ANTB: CPT | Performed by: PATHOLOGY

## 2022-11-17 PROCEDURE — D9220A PRA ANESTHESIA: Mod: CRNA,,, | Performed by: NURSE ANESTHETIST, CERTIFIED REGISTERED

## 2022-11-17 PROCEDURE — 88342 IMHCHEM/IMCYTCHM 1ST ANTB: CPT | Mod: 26,,, | Performed by: PATHOLOGY

## 2022-11-17 PROCEDURE — 88341 IMHCHEM/IMCYTCHM EA ADD ANTB: CPT | Mod: 26,,, | Performed by: PATHOLOGY

## 2022-11-17 PROCEDURE — 88342 IMHCHEM/IMCYTCHM 1ST ANTB: CPT | Performed by: PATHOLOGY

## 2022-11-17 PROCEDURE — 88305 TISSUE EXAM BY PATHOLOGIST: CPT | Mod: 26,,, | Performed by: PATHOLOGY

## 2022-11-17 PROCEDURE — 88342 CHG IMMUNOCYTOCHEMISTRY: ICD-10-PCS | Mod: 26,,, | Performed by: PATHOLOGY

## 2022-11-17 PROCEDURE — 27201012 HC FORCEPS, HOT/COLD, DISP: Performed by: INTERNAL MEDICINE

## 2022-11-17 PROCEDURE — 88341 PR IHC OR ICC EACH ADD'L SINGLE ANTIBODY  STAINPR: ICD-10-PCS | Mod: 26,,, | Performed by: PATHOLOGY

## 2022-11-17 RX ORDER — FENTANYL CITRATE 50 UG/ML
25 INJECTION, SOLUTION INTRAMUSCULAR; INTRAVENOUS EVERY 5 MIN PRN
Status: DISCONTINUED | OUTPATIENT
Start: 2022-11-17 | End: 2022-11-17 | Stop reason: HOSPADM

## 2022-11-17 RX ORDER — PROPOFOL 10 MG/ML
VIAL (ML) INTRAVENOUS
Status: DISCONTINUED | OUTPATIENT
Start: 2022-11-17 | End: 2022-11-17

## 2022-11-17 RX ORDER — ONDANSETRON 2 MG/ML
4 INJECTION INTRAMUSCULAR; INTRAVENOUS DAILY PRN
Status: DISCONTINUED | OUTPATIENT
Start: 2022-11-17 | End: 2022-11-17 | Stop reason: HOSPADM

## 2022-11-17 RX ORDER — PROPOFOL 10 MG/ML
VIAL (ML) INTRAVENOUS CONTINUOUS PRN
Status: DISCONTINUED | OUTPATIENT
Start: 2022-11-17 | End: 2022-11-17

## 2022-11-17 RX ORDER — KETAMINE HCL IN 0.9 % NACL 50 MG/5 ML
SYRINGE (ML) INTRAVENOUS
Status: DISCONTINUED | OUTPATIENT
Start: 2022-11-17 | End: 2022-11-17

## 2022-11-17 RX ORDER — SODIUM CHLORIDE 9 MG/ML
INJECTION, SOLUTION INTRAVENOUS CONTINUOUS
Status: DISCONTINUED | OUTPATIENT
Start: 2022-11-17 | End: 2022-11-17 | Stop reason: HOSPADM

## 2022-11-17 RX ORDER — LIDOCAINE HYDROCHLORIDE 20 MG/ML
INJECTION, SOLUTION EPIDURAL; INFILTRATION; INTRACAUDAL; PERINEURAL
Status: DISCONTINUED | OUTPATIENT
Start: 2022-11-17 | End: 2022-11-17

## 2022-11-17 RX ORDER — MIDAZOLAM HYDROCHLORIDE 1 MG/ML
INJECTION, SOLUTION INTRAMUSCULAR; INTRAVENOUS
Status: DISCONTINUED | OUTPATIENT
Start: 2022-11-17 | End: 2022-11-17

## 2022-11-17 RX ADMIN — LIDOCAINE HYDROCHLORIDE 60 MG: 20 INJECTION, SOLUTION EPIDURAL; INFILTRATION; INTRACAUDAL; PERINEURAL at 08:11

## 2022-11-17 RX ADMIN — Medication 150 MCG/KG/MIN: at 08:11

## 2022-11-17 RX ADMIN — GLYCOPYRROLATE 0.2 MG: 0.2 INJECTION INTRAMUSCULAR; INTRAVENOUS at 08:11

## 2022-11-17 RX ADMIN — Medication 10 MG: at 08:11

## 2022-11-17 RX ADMIN — PROPOFOL 50 MG: 10 INJECTION, EMULSION INTRAVENOUS at 08:11

## 2022-11-17 RX ADMIN — MIDAZOLAM 2 MG: 1 INJECTION INTRAMUSCULAR; INTRAVENOUS at 08:11

## 2022-11-17 RX ADMIN — SODIUM CHLORIDE: 0.9 INJECTION, SOLUTION INTRAVENOUS at 07:11

## 2022-11-17 NOTE — H&P
Short Stay Endoscopy History and Physical    PCP - Kirk Wilson MD     Procedure - EGD  ASA - per anesthesia  Mallampati - per anesthesia  History of Anesthesia problems - no  Family history Anesthesia problems -  no   Plan of anesthesia - General    HPI:  This is a 67 y.o. male here for evaluation of :     Dysphagia    ROS:  Constitutional: No fevers, chills, No weight loss  CV: No chest pain  Pulm: No cough, No shortness of breath  Ophtho: No vision changes  GI: see HPI  Derm: No rash    Medical History:  has a past medical history of Anticoagulant long-term use, Diabetes mellitus, Hyperlipidemia, Hypertension, Kidney stones, and Sleep apnea.    Surgical History:  has a past surgical history that includes Lithotripsy; Parathyroidectomy (1/1/2-107); Left heart catheterization (Left, 9/30/2020); Coronary angiography (N/A, 9/30/2020); Cystoscopy (N/A, 2/17/2022); Ureteroscopic removal of ureteric calculus (Left, 2/25/2022); Laser lithotripsy (Left, 2/25/2022); Cystoscopy w/ ureteral stent placement (N/A, 2/25/2022); Pyeloscopy (Left, 2/25/2022); Ureteroscopy (Left, 2/25/2022); Treatment of cardiac arrhythmia (N/A, 4/7/2022); and Transesophageal echocardiography (N/A, 4/7/2022).    Family History: family history includes Alcohol abuse in his paternal aunt; Arthritis in his brother, father, mother, sister, and sister; Cancer in his maternal grandfather and paternal grandfather; Colon cancer in his paternal grandfather; Colon cancer (age of onset: 32) in his brother; Colon polyps in his paternal grandfather; Diabetes in his father and paternal grandmother; Heart disease (age of onset: 70) in his father; Hypertension in his sister; Kidney disease in his father.. Otherwise no colon cancer, inflammatory bowel disease, or GI malignancies.    Social History:  reports that he quit smoking about 27 years ago. His smoking use included cigarettes and cigars. He started smoking about 50 years ago. He has a 7.00 pack-year smoking  history. He has never used smokeless tobacco. He reports current alcohol use of about 3.0 standard drinks per week. He reports that he does not currently use drugs after having used the following drugs: Marijuana.    Review of patient's allergies indicates:  No Known Allergies    Medications:   Medications Prior to Admission   Medication Sig Dispense Refill Last Dose    ACCU-CHEK SOFTCLIX LANCETS Misc USE EVERY  each 6     allopurinoL (ZYLOPRIM) 100 MG tablet TAKE 1 TABLET BY MOUTH EVERY DAY 30 tablet 10     blood sugar diagnostic (ACCU-CHEK ANTONELLA PLUS TEST STRP) Strp Inject 1 strip into the skin 2 (two) times daily before meals. 100 strip 11     blood-glucose meter kit Checks blood sugars 1x/daily. 1 each 12     glimepiride (AMARYL) 2 MG tablet TAKE 2 TABLETS BY MOUTH EVERY MORNING 180 tablet 2     lisinopriL-hydrochlorothiazide (PRINZIDE,ZESTORETIC) 20-25 mg Tab TAKE 1 TABLET BY MOUTH EVERY DAY 90 tablet 3     metFORMIN (GLUCOPHAGE) 500 MG tablet TAKE 2 TABLETS BY MOUTH EVERY MORNING AND 2 TABLETS WITH DINNER 120 tablet 10     metoprolol succinate (TOPROL-XL) 25 MG 24 hr tablet Take 1 tablet (25 mg total) by mouth once daily. 30 tablet 11     nitroGLYCERIN (NITROSTAT) 0.4 MG SL tablet Place 1 tablet (0.4 mg total) under the tongue every 5 (five) minutes as needed for Chest pain. If you need a third tablet, call 911 60 tablet 12     potassium citrate (UROCIT-K) 10 mEq (1,080 mg) TbSR Take 1 tablet (10 mEq total) by mouth 3 (three) times daily with meals. 90 tablet 9     rivaroxaban (XARELTO) 20 mg Tab Take 1 tablet (20 mg total) by mouth daily with dinner or evening meal. 30 tablet 11     rosuvastatin (CRESTOR) 20 MG tablet TAKE 1 TABLET(20 MG) BY MOUTH EVERY DAY 30 tablet 11     tamsulosin (FLOMAX) 0.4 mg Cap Take 1 capsule (0.4 mg total) by mouth once daily. 30 capsule 11     testosterone (ANDROGEL) 20.25 mg/1.25 gram (1.62 %) GlPm Apply 4 pumps to shoulders daily 2 each 5        Physical Exam:    Vital  Signs: There were no vitals filed for this visit.    Gen: NAD, lying comfortably  HENT: NCAT, oropharynx clear  Eyes: anicteric sclerae, EOMI grossly  Neck: supple, no visible masses/goiter  Cardiac: RRR  Lungs: non-labored breathing  Abd: soft, NT/ND, normoactive BS  Ext: no LE edema, warm, well perfused  Skin: skin intact on exposed body parts, no visible rashes, lesions  Neuro: A&Ox4, neuro exam grossly intact, moves all extremities  Psych: appropriate mood, affect      Labs:  Lab Results   Component Value Date    WBC 5.57 11/07/2022    HGB 14.7 11/07/2022    HCT 47.3 11/07/2022     11/07/2022    CHOL 107 (L) 11/07/2022    TRIG 218 (H) 11/07/2022    HDL 34 (L) 11/07/2022    ALT 23 11/07/2022    AST 26 11/07/2022     11/07/2022    K 5.1 11/07/2022     11/07/2022    CREATININE 1.5 (H) 11/07/2022    BUN 17 11/07/2022    CO2 28 11/07/2022    TSH 2.537 11/07/2022    PSA 1.2 10/06/2021    INR 1.1 04/01/2022    HGBA1C 6.9 (H) 11/07/2022       Plan:  EGD for dysphagia    I have explained the risks and benefits of endoscopy procedures to the patient including but not limited to bleeding, perforation, infection, and death.  The patient was asked if they understand and allowed to ask any further questions to their satisfaction.      Eric Li MD

## 2022-11-17 NOTE — ANESTHESIA PREPROCEDURE EVALUATION
11/17/2022  Lukasz Person is a 67 y.o., male.  Pre-operative evaluation for Procedure(s) (LRB):  EGD (ESOPHAGOGASTRODUODENOSCOPY) (N/A)    Lukasz Person is a 67 y.o. male     Patient Active Problem List   Diagnosis    Type 2 diabetes mellitus with kidney complication, without long-term current use of insulin    KUMAR on CPAP    Gout    Hyperparathyroidism, primary    Hypertension associated with diabetes    Hyperlipidemia associated with type 2 diabetes mellitus    Severe obesity (BMI 35.0-35.9 with comorbidity)    Obesity, diabetes, and hypertension syndrome    DM type 2 without retinopathy    Diabetes mellitus with cataract    Male hypogonadism    Coronary artery disease involving native coronary artery of native heart    Low serum alkaline phosphatase    BPH with urinary obstruction    Erectile dysfunction due to arterial insufficiency    Chronic left shoulder pain    Nephrolithiasis    Paroxysmal atrial fibrillation    Chronic anticoagulation    HFrEF (heart failure with reduced ejection fraction)    Long term current use of amiodarone    Non-ischemic cardiomyopathy    CKD stage 3 due to type 2 diabetes mellitus       Review of patient's allergies indicates:  No Known Allergies    No current facility-administered medications on file prior to encounter.     Current Outpatient Medications on File Prior to Encounter   Medication Sig Dispense Refill    allopurinoL (ZYLOPRIM) 100 MG tablet TAKE 1 TABLET BY MOUTH EVERY DAY 30 tablet 10    lisinopriL-hydrochlorothiazide (PRINZIDE,ZESTORETIC) 20-25 mg Tab TAKE 1 TABLET BY MOUTH EVERY DAY 90 tablet 3    metFORMIN (GLUCOPHAGE) 500 MG tablet TAKE 2 TABLETS BY MOUTH EVERY MORNING AND 2 TABLETS WITH DINNER 120 tablet 10    metoprolol succinate (TOPROL-XL) 25 MG 24 hr tablet Take 1 tablet (25 mg total) by mouth once daily. 30 tablet 11     potassium citrate (UROCIT-K) 10 mEq (1,080 mg) TbSR Take 1 tablet (10 mEq total) by mouth 3 (three) times daily with meals. 90 tablet 9    rosuvastatin (CRESTOR) 20 MG tablet TAKE 1 TABLET(20 MG) BY MOUTH EVERY DAY 30 tablet 11    tamsulosin (FLOMAX) 0.4 mg Cap Take 1 capsule (0.4 mg total) by mouth once daily. 30 capsule 11    ACCU-CHEK SOFTCLIX LANCETS Misc USE EVERY  each 6    blood sugar diagnostic (ACCU-CHEK ANTOENLLA PLUS TEST STRP) Strp Inject 1 strip into the skin 2 (two) times daily before meals. 100 strip 11    blood-glucose meter kit Checks blood sugars 1x/daily. 1 each 12    nitroGLYCERIN (NITROSTAT) 0.4 MG SL tablet Place 1 tablet (0.4 mg total) under the tongue every 5 (five) minutes as needed for Chest pain. If you need a third tablet, call 911 60 tablet 12    rivaroxaban (XARELTO) 20 mg Tab Take 1 tablet (20 mg total) by mouth daily with dinner or evening meal. 30 tablet 11    testosterone (ANDROGEL) 20.25 mg/1.25 gram (1.62 %) GlPm Apply 4 pumps to shoulders daily 2 each 5       Past Surgical History:   Procedure Laterality Date    CORONARY ANGIOGRAPHY N/A 9/30/2020    Procedure: ANGIOGRAM, CORONARY ARTERY;  Surgeon: John West MD;  Location: [facility] CATH LAB;  Service: Cardiology;  Laterality: N/A;    CYSTOSCOPY N/A 2/17/2022    Procedure: CYSTOSCOPY;  Surgeon: Lukasz Hughes MD;  Location: [address];  Service: Urology;  Laterality: N/A;    CYSTOSCOPY W/ URETERAL STENT PLACEMENT N/A 2/25/2022    Procedure: CYSTOSCOPY, WITH URETERAL STENT INSERTION;  Surgeon: Lukasz Hughes MD;  Location: [address];  Service: Urology;  Laterality: N/A;    LASER LITHOTRIPSY Left 2/25/2022    Procedure: LITHOTRIPSY, USING LASER;  Surgeon: Lukasz Hughes MD;  Location: [address];  Service: Urology;  Laterality: Left;    LEFT HEART CATHETERIZATION Left 9/30/2020    Procedure: Left heart cath;  Surgeon: John West MD;  Location: [facility] CATH LAB;  Service:  Cardiology;  Laterality: Left;    LITHOTRIPSY      PARATHYROIDECTOMY  -107    PYELOSCOPY Left 2022    Procedure: PYELOSCOPY;  Surgeon: Lukasz Hughes MD;  Location: [facility] OR [address] [place] [address];  Service: Urology;  Laterality: Left;    TRANSESOPHAGEAL ECHOCARDIOGRAPHY N/A 2022    Procedure: ECHOCARDIOGRAM, TRANSESOPHAGEAL;  Surgeon: Xander Diagnostic Provider;  Location: [facility] EP LAB;  Service: Cardiology;  Laterality: N/A;    TREATMENT OF CARDIAC ARRHYTHMIA N/A 2022    Procedure: Cardioversion or Defibrillation;  Surgeon: Iris Fisher NP;  Location: [facility] EP LAB;  Service: Cardiology;  Laterality: N/A;  afib, dccv, artie, anes, EH, 3prep    URETEROSCOPIC REMOVAL OF URETERIC CALCULUS Left 2022    Procedure: REMOVAL, CALCULUS, URETER, URETEROSCOPIC;  Surgeon: Lukasz Hughes MD;  Location: [facility] OR [address] [place] [address];  Service: Urology;  Laterality: Left;    URETEROSCOPY Left 2022    Procedure: URETEROSCOPY;  Surgeon: Lukasz Hughes MD;  Location: [address];  Service: Urology;  Laterality: Left;       Social History     Socioeconomic History    Marital status:    Tobacco Use    Smoking status: Former     Packs/day: 1.00     Years: 7.00     Pack years: 7.00     Types: Cigarettes, Cigars     Start date: 1972     Quit date:      Years since quittin.5    Smokeless tobacco: Never    Tobacco comments:     smoking was off and on.  cumulative 7 years.  never more than three years in one stretch or more lj   Substance and Sexual Activity    Alcohol use: Yes     Alcohol/week: 3.0 standard drinks     Types: 2 Cans of beer, 1 Shots of liquor per week     Comment: don't drink regularly.  6 to 7 monthly    Drug use: Not Currently     Types: Marijuana    Sexual activity: Not Currently     Partners: Female     Birth control/protection: None     Comment:      Social Determinants of Health     Financial Resource Strain: Low Risk     Difficulty of Paying Living Expenses: Not  very hard   Food Insecurity: No Food Insecurity    Worried About Running Out of Food in the Last Year: Never true    Ran Out of Food in the Last Year: Never true   Transportation Needs: No Transportation Needs    Lack of Transportation (Medical): No    Lack of Transportation (Non-Medical): No   Physical Activity: Sufficiently Active    Days of Exercise per Week: 4 days    Minutes of Exercise per Session: 40 min   Stress: No Stress Concern Present    Feeling of Stress : Not at all   Social Connections: Unknown    Frequency of Communication with Friends and Family: Once a week    Frequency of Social Gatherings with Friends and Family: Once a week    Active Member of Clubs or Organizations: Yes    Attends Club or Organization Meetings: More than 4 times per year    Marital Status:    Housing Stability: Low Risk     Unable to Pay for Housing in the Last Year: No    Number of Places Lived in the Last Year: 1    Unstable Housing in the Last Year: No           Pre-op Assessment    I have reviewed the Patient Summary Reports.     I have reviewed the Nursing Notes.    I have reviewed the Medications.     Review of Systems  Anesthesia Hx:  No problems with previous Anesthesia  History of prior surgery of interest to airway management or planning: Denies Family Hx of Anesthesia complications.   Denies Personal Hx of Anesthesia complications.   Social:  Non-Smoker    Hematology/Oncology:  Hematology Normal   Oncology Normal     EENT/Dental:EENT/Dental Normal   Cardiovascular:   Hypertension CAD      Pulmonary:   Sleep Apnea, CPAP    Renal/:   Chronic Renal Disease    Hepatic/GI:  Hepatic/GI Normal    Musculoskeletal:  Musculoskeletal Normal    Neurological:  Neurology Normal    Endocrine:   Diabetes    Psych:  Psychiatric Normal           Physical Exam  General: Well nourished and Cooperative    Airway:  Mallampati: III   Mouth Opening: Normal  TM Distance: Normal  Tongue: Normal  Neck ROM: Normal  ROM    Dental:  Intact    Chest/Lungs:  Clear to auscultation, Normal Respiratory Rate    Heart:  Rate: Normal  Rhythm: Regular Rhythm  Sounds: Normal        Anesthesia Plan  Type of Anesthesia, risks & benefits discussed:    Anesthesia Type: Gen ETT, Gen Natural Airway  Intra-op Monitoring Plan: Standard ASA Monitors  Post Op Pain Control Plan: multimodal analgesia  Induction:  IV  Airway Plan: , Post-Induction  Informed Consent: Informed consent signed with the Patient and all parties understand the risks and agree with anesthesia plan.  All questions answered.   ASA Score: 3  Day of Surgery Review of History & Physical: H&P Update referred to the surgeon/provider.    Ready For Surgery From Anesthesia Perspective.     .

## 2022-11-17 NOTE — ANESTHESIA POSTPROCEDURE EVALUATION
Anesthesia Post Evaluation    Patient: Lukasz Person    Procedure(s) Performed: Procedure(s) (LRB):  EGD (ESOPHAGOGASTRODUODENOSCOPY) (N/A)    Final Anesthesia Type: general      Patient location during evaluation: PACU  Patient participation: Yes- Able to Participate  Level of consciousness: awake and alert  Post-procedure vital signs: reviewed and stable  Pain management: adequate  Airway patency: patent    PONV status at discharge: No PONV  Anesthetic complications: no      Cardiovascular status: blood pressure returned to baseline and hemodynamically stable  Respiratory status: unassisted and spontaneous ventilation  Hydration status: euvolemic  Follow-up not needed.          Vitals Value Taken Time   /57 11/17/22 0916   Temp 36.7 °C (98.1 °F) 11/17/22 0905   Pulse 55 11/17/22 0926   Resp 17 11/17/22 0926   SpO2 97 % 11/17/22 0926   Vitals shown include unvalidated device data.      No case tracking events are documented in the log.      Pain/Jeovanny Score: Jeovanny Score: 9 (11/17/2022  9:05 AM)

## 2022-11-17 NOTE — PLAN OF CARE
Patient is stable and ready for discharge. Instructions and report given to patient. Questions answered. Patient tolerating po liquids with no difficulty. Patient has no pain or states it is a tolerable level for them. Anesthesia consent and surgical consent in chart.

## 2022-11-17 NOTE — PROVATION PATIENT INSTRUCTIONS
Discharge Summary/Instructions after an Endoscopic Procedure  Patient Name: Lukasz Person  Patient MRN: [id number]  Patient YOB: 1955  Thursday, November 17, 2022  Brody Gonzales MD  Dear patient,  As a result of recent federal legislation (The Federal Cures Act), you may   receive lab or pathology results from your procedure in your [facility]   account before your physician is able to contact you. Your physician or   their representative will relay the results to you with their   recommendations at their soonest availability.  Thank you,  RESTRICTIONS:  During your procedure today, you received medications for sedation.  These   medications may affect your judgment, balance and coordination.  Therefore,   for 24 hours, you have the following restrictions:   - DO NOT drive a car, operate machinery, make legal/financial decisions,   sign important papers or drink alcohol.    ACTIVITY:  Today: no heavy lifting, straining or running due to procedural   sedation/anesthesia.  The following day: return to full activity including work.  DIET:  Eat and drink normally unless instructed otherwise.     TREATMENT FOR COMMON SIDE EFFECTS:  - Mild abdominal pain, nausea, belching, bloating or excessive gas:  rest,   eat lightly and use a heating pad.  - Sore Throat: treat with throat lozenges and/or gargle with warm salt   water.  - Because air was used during the procedure, expelling large amounts of air   from your rectum or belching is normal.  - If a bowel prep was taken, you may not have a bowel movement for 1-3 days.    This is normal.  SYMPTOMS TO WATCH FOR AND REPORT TO YOUR PHYSICIAN:  1. Abdominal pain or bloating, other than gas cramps.  2. Chest pain.  3. Back pain.  4. Signs of infection such as: chills or fever occurring within 24 hours   after the procedure.  5. Rectal bleeding, which would show as bright red, maroon, or black stools.   (A tablespoon of blood from the rectum is not serious, especially  if   hemorrhoids are present.)  6. Vomiting.  7. Weakness or dizziness.  GO DIRECTLY TO THE NEAREST EMERGENCY ROOM IF YOU HAVE ANY OF THE FOLLOWING:      Difficulty breathing              Chills and/or fever over 101 F   Persistent vomiting and/or vomiting blood   Severe abdominal pain   Severe chest pain   Black, tarry stools   Bleeding- more than one tablespoon   Any other symptom or condition that you feel may need urgent attention  Your doctor recommends these additional instructions:  If any biopsies were taken, your doctors clinic will contact you in 1 to 2   weeks with any results.  - Discharge patient to home.   - Mechanical soft diet.   - Use a proton pump inhibitor PO daily.   - Repeat upper endoscopy in 8 weeks to check healing.   For questions, problems or results please call your physician - Brody Gonzales MD at Work:  [phone].  [facility], EMERGENCY ROOM PHONE NUMBER: [phone]  IF A COMPLICATION OR EMERGENCY SITUATION ARISES AND YOU ARE UNABLE TO REACH   YOUR PHYSICIAN - GO DIRECTLY TO THE EMERGENCY ROOM.  Brody Gonzales MD  11/17/2022 9:08:38 AM  This report has been verified and signed electronically.  Dear patient,  As a result of recent federal legislation (The Federal Cures Act), you may   receive lab or pathology results from your procedure in your [facility]   account before your physician is able to contact you. Your physician or   their representative will relay the results to you with their   recommendations at their soonest availability.  Thank you,  [employer]OVATION

## 2022-11-17 NOTE — TRANSFER OF CARE
"Anesthesia Transfer of Care Note    Patient: Lukasz Person    Procedure(s) Performed: Procedure(s) (LRB):  EGD (ESOPHAGOGASTRODUODENOSCOPY) (N/A)    Patient location: [facility]    Anesthesia Type: general    Transport from OR: Transported from OR on 2-3 L/min O2 by NC with adequate spontaneous ventilation    Post pain: adequate analgesia    Post assessment: tolerated procedure well and no apparent anesthetic complications    Post vital signs: stable    Level of consciousness: sedated    Nausea/Vomiting: no nausea/vomiting    Complications: none    Transfer of care protocol was followed      Last vitals:   Visit Vitals  BP (!) 153/70 (Patient Position: Lying)   Pulse (!) 57   Temp 36.8 °C (98.2 °F) (Temporal)   Resp 16   Ht 6' 1" (1.854 m)   Wt 119.3 kg (263 lb)   SpO2 96%   BMI 34.70 kg/m²     "

## 2022-11-18 ENCOUNTER — PATIENT MESSAGE (OUTPATIENT)
Dept: INTERNAL MEDICINE | Facility: CLINIC | Age: 67
End: 2022-11-18
Payer: MEDICARE

## 2022-11-18 RX ORDER — OMEPRAZOLE 40 MG/1
40 CAPSULE, DELAYED RELEASE ORAL DAILY
Qty: 90 CAPSULE | Refills: 3 | Status: SHIPPED | OUTPATIENT
Start: 2022-11-18 | End: 2023-11-04

## 2022-11-19 ENCOUNTER — PATIENT MESSAGE (OUTPATIENT)
Dept: INTERNAL MEDICINE | Facility: CLINIC | Age: 67
End: 2022-11-19
Payer: MEDICARE

## 2022-11-22 DIAGNOSIS — N20.0 NEPHROLITHIASIS: Primary | ICD-10-CM

## 2022-11-25 ENCOUNTER — PATIENT MESSAGE (OUTPATIENT)
Dept: CARDIOLOGY | Facility: CLINIC | Age: 67
End: 2022-11-25
Payer: MEDICARE

## 2022-11-25 NOTE — TELEPHONE ENCOUNTER
Spoke with patient. Patient will be Day 6. Requested patient wear mask as per guidance of CDC. Verbalized understanding.

## 2022-11-28 ENCOUNTER — PATIENT MESSAGE (OUTPATIENT)
Dept: INTERNAL MEDICINE | Facility: CLINIC | Age: 67
End: 2022-11-28
Payer: MEDICARE

## 2022-11-28 ENCOUNTER — PATIENT MESSAGE (OUTPATIENT)
Dept: NEPHROLOGY | Facility: CLINIC | Age: 67
End: 2022-11-28
Payer: MEDICARE

## 2022-11-29 ENCOUNTER — HOSPITAL ENCOUNTER (OUTPATIENT)
Dept: RADIOLOGY | Facility: HOSPITAL | Age: 67
Discharge: HOME | End: 2022-11-29
Attending: STUDENT IN AN ORGANIZED HEALTH CARE EDUCATION/TRAINING PROGRAM
Payer: MEDICARE

## 2022-11-29 ENCOUNTER — OFFICE VISIT (OUTPATIENT)
Dept: CARDIOLOGY | Facility: CLINIC | Age: 67
End: 2022-11-29
Payer: MEDICARE

## 2022-11-29 ENCOUNTER — OFFICE VISIT (OUTPATIENT)
Dept: SLEEP MEDICINE | Facility: CLINIC | Age: 67
End: 2022-11-29
Payer: MEDICARE

## 2022-11-29 VITALS
WEIGHT: 260.81 LBS | DIASTOLIC BLOOD PRESSURE: 72 MMHG | SYSTOLIC BLOOD PRESSURE: 130 MMHG | HEIGHT: 72 IN | BODY MASS INDEX: 35.33 KG/M2 | HEART RATE: 56 BPM

## 2022-11-29 VITALS
WEIGHT: 260.13 LBS | SYSTOLIC BLOOD PRESSURE: 136 MMHG | DIASTOLIC BLOOD PRESSURE: 72 MMHG | BODY MASS INDEX: 35.28 KG/M2 | HEART RATE: 63 BPM

## 2022-11-29 DIAGNOSIS — E11.69 HYPERLIPIDEMIA ASSOCIATED WITH TYPE 2 DIABETES MELLITUS: Primary | Chronic | ICD-10-CM

## 2022-11-29 DIAGNOSIS — E78.5 HYPERLIPIDEMIA ASSOCIATED WITH TYPE 2 DIABETES MELLITUS: Primary | Chronic | ICD-10-CM

## 2022-11-29 DIAGNOSIS — N18.30 CKD STAGE 3 DUE TO TYPE 2 DIABETES MELLITUS: ICD-10-CM

## 2022-11-29 DIAGNOSIS — I48.0 PAROXYSMAL ATRIAL FIBRILLATION: Chronic | ICD-10-CM

## 2022-11-29 DIAGNOSIS — E11.59 HYPERTENSION ASSOCIATED WITH DIABETES: Chronic | ICD-10-CM

## 2022-11-29 DIAGNOSIS — Z79.01 CHRONIC ANTICOAGULATION: ICD-10-CM

## 2022-11-29 DIAGNOSIS — E11.22 CKD STAGE 3 DUE TO TYPE 2 DIABETES MELLITUS: ICD-10-CM

## 2022-11-29 DIAGNOSIS — N20.0 NEPHROLITHIASIS: ICD-10-CM

## 2022-11-29 DIAGNOSIS — I15.2 HYPERTENSION ASSOCIATED WITH DIABETES: Chronic | ICD-10-CM

## 2022-11-29 DIAGNOSIS — G47.33 OSA (OBSTRUCTIVE SLEEP APNEA): Primary | ICD-10-CM

## 2022-11-29 PROCEDURE — 74018 RADEX ABDOMEN 1 VIEW: CPT | Mod: 26,,, | Performed by: RADIOLOGY

## 2022-11-29 PROCEDURE — 99213 OFFICE O/P EST LOW 20 MIN: CPT | Mod: PBBFAC,25 | Performed by: NURSE PRACTITIONER

## 2022-11-29 PROCEDURE — 99999 PR PBB SHADOW E&M-EST. PATIENT-LVL III: ICD-10-PCS | Mod: PBBFAC,,, | Performed by: NURSE PRACTITIONER

## 2022-11-29 PROCEDURE — 74018 RADEX ABDOMEN 1 VIEW: CPT | Mod: TC

## 2022-11-29 PROCEDURE — 99214 PR OFFICE/OUTPT VISIT, EST, LEVL IV, 30-39 MIN: ICD-10-PCS | Mod: S$PBB,,, | Performed by: NURSE PRACTITIONER

## 2022-11-29 PROCEDURE — 99214 OFFICE O/P EST MOD 30 MIN: CPT | Mod: S$PBB,,, | Performed by: INTERNAL MEDICINE

## 2022-11-29 PROCEDURE — 99999 PR PBB SHADOW E&M-EST. PATIENT-LVL III: CPT | Mod: PBBFAC,,, | Performed by: NURSE PRACTITIONER

## 2022-11-29 PROCEDURE — 99214 OFFICE O/P EST MOD 30 MIN: CPT | Mod: PBBFAC,25,27,PO | Performed by: INTERNAL MEDICINE

## 2022-11-29 PROCEDURE — 76770 US EXAM ABDO BACK WALL COMP: CPT | Mod: TC

## 2022-11-29 PROCEDURE — 99214 OFFICE O/P EST MOD 30 MIN: CPT | Mod: S$PBB,,, | Performed by: NURSE PRACTITIONER

## 2022-11-29 PROCEDURE — 99214 PR OFFICE/OUTPT VISIT, EST, LEVL IV, 30-39 MIN: ICD-10-PCS | Mod: S$PBB,,, | Performed by: INTERNAL MEDICINE

## 2022-11-29 PROCEDURE — 76770 US RETROPERITONEAL COMPLETE: ICD-10-PCS | Mod: 26,,, | Performed by: INTERNAL MEDICINE

## 2022-11-29 PROCEDURE — 76770 US EXAM ABDO BACK WALL COMP: CPT | Mod: 26,,, | Performed by: INTERNAL MEDICINE

## 2022-11-29 PROCEDURE — 74018 XR ABDOMEN AP 1 VIEW: ICD-10-PCS | Mod: 26,,, | Performed by: RADIOLOGY

## 2022-11-29 PROCEDURE — 99999 PR PBB SHADOW E&M-EST. PATIENT-LVL IV: CPT | Mod: PBBFAC,,, | Performed by: INTERNAL MEDICINE

## 2022-11-29 PROCEDURE — 99999 PR PBB SHADOW E&M-EST. PATIENT-LVL IV: ICD-10-PCS | Mod: PBBFAC,,, | Performed by: INTERNAL MEDICINE

## 2022-11-29 NOTE — PROGRESS NOTES
CC: KUMAR, initial visit with him, last seen by Dr. Keys 9/2020    Continues to sleep qhs with cpap 9cm. BP stable. Has outside sleep studies in [place] years ago, reviewed today. Eligible new machine.   HgBA1c 6.9 (11/2022)    Interrogation airsense 10machine avg 8h/n AHI 1.6, 360/365days>4h. Nose pillow mask/heated hose    /72   Pulse 63   Wt 118 kg (260 lb 2.3 oz)   BMI 35.28 kg/m²     Assessment:  KUMAR, severe. Continued excellent adherence with cpap, AHI<5. Benefits from therapy. Eligible new machine  HTN, associated with DM    Plan:  Get new cpap 9cm DME [facility]. RTC b/t 31-90d after setup ins use guidelines. RX provided for potential travel machine  Discussed control of KUMAR  See pcp re HTN and DM mgt/continue meds

## 2022-11-29 NOTE — PATIENT INSTRUCTIONS
11/7/2022 10/4/2022 3/22/2022   Cholesterol 107 (L) 98 (L) 94 (L)   Triglycerides 218 (H) 219 (H) 109   HDL 34 (L) 38 (L) 42   LDL  29.4 (L) 16.2 (L) 30.2 (L)   HDL/Cholesterol  31.8 38.8 44.7     Ideal Reference Range   Cholesterol      Variable   Triglycerides      < 150 mg/dL   HDL (good type)      > 40 mg/dL   LDL (bad type)      < 70 mg/dL   HDL/Cholesterol Ratio      > 30%          Triglycerides are made worse by the following    Foods containing fat and sugars: ice cream, desserts, cakes, chocolate, icing  Beer, fruit juices, sodas  Starches: rice, potatoes, bread, pasta  Lack of exercise  Uncontrolled diabetes      You certainly do not need to have this device but at I would like you to know about it:  [employer] also known as [employer] makes a device that you can use to check your heart rhythm. The gabriela analyses the heart rhythm and is accurate more than 90% of the time in detecting atrial fibrillation.  It also integrates with the [employer] gabriela allowing you to send the tracing to your physician.  We are however unable to respond in real-time to these messages.          There are several watches made by [employer], [employer] and [employer] that record and analyze your heart rhythm - particularly useful for detecting atrial fibrillation. The reading is sent to your phone.

## 2022-11-29 NOTE — PROGRESS NOTES
Subjective:     Problem List:  Non-obstructive CAD  Diabetes mellitus since his late 50s/early 60  Hypertension since his late 50s/early 60s  Mixed hyperlipidemia  Paroxysmal atrial fibrillation in setting of pyelonephritis 4/2022  KUMAR - 2006  Lithotripsy  Parathyroidectomy 2017  7 pack year h/o smoking, quit in 1972    HPI:   Lukasz Person is a 68 y.o. male who presents for follow-up of Atrial Fibrillation and Coronary Artery Disease    He has occasional palpitations described as palpitations lasting a few seconds. On rivaroxaban for stroke prevention. He does not report excessive bruising, nose bleeds, melena or bleeding from other sites.  He does not report angina or shortness of breath with exertion.     On rosuvastatin 20 mg LDL is < 55.  Hepatic transaminases are within normal limits.  Triglycerides are elevated: 219 vs 109 in 2022. Father-in-law from [place] moved in with them and needs care. He has not been physically active.      Review of patient's allergies indicates:  No Known Allergies     Current Outpatient Medications   Medication Sig    ACCU-CHEK SOFTCLIX LANCETS Misc USE EVERY DAY    allopurinoL (ZYLOPRIM) 100 MG tablet TAKE 1 TABLET BY MOUTH EVERY DAY    blood sugar diagnostic (ACCU-CHEK ANTONELLA PLUS TEST STRP) Strp Inject 1 strip into the skin 2 (two) times daily before meals.    blood-glucose meter kit Checks blood sugars 1x/daily.    glimepiride (AMARYL) 2 MG tablet TAKE 2 TABLETS BY MOUTH EVERY MORNING    lisinopriL-hydrochlorothiazide (PRINZIDE,ZESTORETIC) 20-25 mg Tab TAKE 1 TABLET BY MOUTH EVERY DAY    metFORMIN (GLUCOPHAGE) 500 MG tablet TAKE 2 TABLETS BY MOUTH EVERY MORNING AND 2 TABLETS WITH DINNER    metoprolol succinate (TOPROL-XL) 25 MG 24 hr tablet Take 1 tablet (25 mg total) by mouth once daily.    nitroGLYCERIN (NITROSTAT) 0.4 MG SL tablet Place 1 tablet (0.4 mg total) under the tongue every 5 (five) minutes as needed for Chest pain. If you need a third tablet, call 911    omeprazole  (PRILOSEC) 40 MG capsule Take 1 capsule (40 mg total) by mouth once daily.    potassium citrate (UROCIT-K) 10 mEq (1,080 mg) TbSR Take 1 tablet (10 mEq total) by mouth 3 (three) times daily with meals.    rivaroxaban (XARELTO) 20 mg Tab Take 1 tablet (20 mg total) by mouth daily with dinner or evening meal.    rosuvastatin (CRESTOR) 20 MG tablet TAKE 1 TABLET(20 MG) BY MOUTH EVERY DAY    tamsulosin (FLOMAX) 0.4 mg Cap Take 1 capsule (0.4 mg total) by mouth once daily.    testosterone (ANDROGEL) 20.25 mg/1.25 gram (1.62 %) GlPm Apply 4 pumps to shoulders daily     No current facility-administered medications for this visit.       Social history:  Lukasz Person  reports that he quit smoking about 27 years ago. His smoking use included cigarettes and cigars. He started smoking about 50 years ago. He has a 7.00 pack-year smoking history. He has never been exposed to tobacco smoke. He has never used smokeless tobacco. He reports current alcohol use of about 3.0 standard drinks per week. He reports that he does not currently use drugs after having used the following drugs: Marijuana.      Objective:     Physical Exam  Constitutional:       Appearance: He is well-developed.      Comments: /72   Pulse (!) 56   Ht 6' (1.829 m)   Wt 118.3 kg (260 lb 12.9 oz)   BMI 35.37 kg/m²      HENT:      Head: Normocephalic and atraumatic.   Neck:      Vascular: No carotid bruit or JVD.   Cardiovascular:      Rate and Rhythm: Normal rate and regular rhythm.      Pulses:           Radial pulses are 2+ on the right side and 2+ on the left side.        Posterior tibial pulses are 2+ on the right side and 2+ on the left side.      Heart sounds: S1 normal and S2 normal. No murmur heard.    No gallop.   Pulmonary:      Effort: Pulmonary effort is normal.      Breath sounds: No wheezing or rales.   Chest:      Chest wall: There is no dullness to percussion.   Abdominal:      Palpations: Abdomen is soft. There is no hepatomegaly or  splenomegaly.      Tenderness: There is no abdominal tenderness.   Musculoskeletal:      Right lower leg: No edema.      Left lower leg: No edema.   Skin:     General: Skin is warm and dry.      Findings: No bruising.      Nails: There is no clubbing.   Neurological:      Mental Status: He is alert and oriented to person, place, and time.      Gait: Gait normal.   Psychiatric:         Speech: Speech normal.         Behavior: Behavior normal.         Thought Content: Thought content normal.         Judgment: Judgment normal.         Lab Results   Component Value Date    CHOL 107 (L) 11/07/2022    HDL 34 (L) 11/07/2022    LDLCALC 29.4 (L) 11/07/2022    TRIG 218 (H) 11/07/2022    CHOLHDL 31.8 11/07/2022     Lab Results   Component Value Date     (H) 11/07/2022    CREATININE 1.5 (H) 11/07/2022    BUN 17 11/07/2022     11/07/2022    K 5.1 11/07/2022     11/07/2022    CO2 28 11/07/2022     Lab Results   Component Value Date    ALT 23 11/07/2022    AST 26 11/07/2022    ALKPHOS 50 (L) 11/07/2022    BILITOT 0.4 11/07/2022       Results for orders placed during the hospital encounter of 05/26/22    Echo Saline Bubble? No    Interpretation Summary  · Mild left atrial enlargement.  · The left ventricle is normal in size with normal systolic function.  · The estimated ejection fraction is 60%.  · Normal left ventricular diastolic function.  · Normal right ventricular size with normal right ventricular systolic function.  · Aortic sclerosis w/o stenosis.  · The estimated PA systolic pressure is 31 mmHg.  · Normal central venous pressure (3 mmHg).       No valid procedures specified.        Assessment and Plan:       ICD-10-CM ICD-9-CM   1. Hyperlipidemia associated with type 2 diabetes mellitus  E11.69 250.80    E78.5 272.4   2. Hypertension associated with diabetes  E11.59 250.80    I15.2 401.9   3. Paroxysmal atrial fibrillation  I48.0 427.31   4. CKD stage 3 due to type 2 diabetes mellitus  E11.22 250.40     N18.30 585.3   5. Chronic anticoagulation  Z79.01 V58.61        He has not had prolonged palpitations.  Atrial fibrillation occurred during an illness. Continue same dose of metoprolol. He does not need an antiarrhythmic. [id number] score is 3. Continue anticoagulation for stroke prevention. Avoid NSAIDs.  Hypertension well controlled. Continue same medications for hypertension. Low sodium diet.   LDL <55 but triglycerides >200 and HDL in the 30s. Continue same dose of rosuvastatin.       Orders placed during this encounter:     Hyperlipidemia associated with type 2 diabetes mellitus  -     Lipid Panel; Future; Expected date: 11/29/2023  -     CBC Without Differential; Future; Expected date: 11/29/2023  -     Lipid Panel; Future; Expected date: 11/01/2023    Hypertension associated with diabetes  -     Comprehensive Metabolic Panel; Future; Expected date: 11/29/2023  -     Comprehensive Metabolic Panel; Future; Expected date: 11/01/2023    Paroxysmal atrial fibrillation  -     EKG 12-lead; Future; Expected date: 11/29/2023  -     EKG 12-lead; Future; Expected date: 11/01/2023    CKD stage 3 due to type 2 diabetes mellitus    Chronic anticoagulation  -     CBC Without Differential; Future; Expected date: 11/01/2023         Follow up in about 1 year (around 11/29/2023).

## 2022-11-29 NOTE — TELEPHONE ENCOUNTER
Vm left for patient to call to be seen next week to discuss endoscopy and next steps. [facility] message also sent

## 2022-11-30 ENCOUNTER — OFFICE VISIT (OUTPATIENT)
Dept: UROLOGY | Facility: CLINIC | Age: 67
End: 2022-11-30
Payer: MEDICARE

## 2022-11-30 ENCOUNTER — PATIENT OUTREACH (OUTPATIENT)
Dept: ADMINISTRATIVE | Facility: HOSPITAL | Age: 67
End: 2022-11-30
Payer: MEDICARE

## 2022-11-30 VITALS
DIASTOLIC BLOOD PRESSURE: 68 MMHG | WEIGHT: 261 LBS | HEART RATE: 60 BPM | BODY MASS INDEX: 34.59 KG/M2 | SYSTOLIC BLOOD PRESSURE: 133 MMHG | HEIGHT: 73 IN

## 2022-11-30 DIAGNOSIS — Z12.11 ENCOUNTER FOR COLONOSCOPY IN PATIENT WITH FAMILY HISTORY OF COLON CANCER: Primary | ICD-10-CM

## 2022-11-30 DIAGNOSIS — Z80.0 ENCOUNTER FOR COLONOSCOPY IN PATIENT WITH FAMILY HISTORY OF COLON CANCER: Primary | ICD-10-CM

## 2022-11-30 DIAGNOSIS — N20.0 KIDNEY STONES: Primary | ICD-10-CM

## 2022-11-30 PROCEDURE — 99214 PR OFFICE/OUTPT VISIT, EST, LEVL IV, 30-39 MIN: ICD-10-PCS | Mod: S$PBB,,, | Performed by: UROLOGY

## 2022-11-30 PROCEDURE — 99214 OFFICE O/P EST MOD 30 MIN: CPT | Mod: S$PBB,,, | Performed by: UROLOGY

## 2022-11-30 PROCEDURE — 99214 OFFICE O/P EST MOD 30 MIN: CPT | Mod: PBBFAC | Performed by: UROLOGY

## 2022-11-30 PROCEDURE — 99999 PR PBB SHADOW E&M-EST. PATIENT-LVL IV: CPT | Mod: PBBFAC,,, | Performed by: UROLOGY

## 2022-11-30 PROCEDURE — 99999 PR PBB SHADOW E&M-EST. PATIENT-LVL IV: ICD-10-PCS | Mod: PBBFAC,,, | Performed by: UROLOGY

## 2022-11-30 NOTE — PROGRESS NOTES
Subjective:      Patient ID: Lukasz Person is a 67 y.o. male.    Chief Complaint: Nephrolithiasis (6m U/S & kub)    Patient is a 67 y.o. male who is established to our clinic and was initially referred by their PCP, Dr. Wilson for evaluation of kidney stones.     HPI  Patient underwent staged ureteroscopy for left distal ureteral stone and renal stone on 2/17/22 and 2/25/22.  Complicated by pyelonephritis post-op.  Now doing well.  Here to review imaging.   Ultrasound was independently reviewed today and reveals a small, 3mm non-obstructing right renal stone, no hydronephrosis.   KUB was independently reviewed today and reveals punctate right renal stone.     Of note, patient has a h/o hyperparathyroidism s/p parathyroidectomy approximately 10 years ago---in [place].       Review of Systems  All other systems reviewed and negative except pertinent positives noted in HPI.    Objective:     Physical Exam  Constitutional:       General: He is not in acute distress.     Appearance: He is well-developed.   HENT:      Head: Normocephalic and atraumatic.   Eyes:      General: No scleral icterus.  Neck:      Trachea: No tracheal deviation.   Pulmonary:      Effort: Pulmonary effort is normal. No respiratory distress.   Neurological:      Mental Status: He is alert and oriented to person, place, and time.   Psychiatric:         Behavior: Behavior normal.         Thought Content: Thought content normal.         Judgment: Judgment normal.     Assessment:     1. Kidney stones      Plan:     1. Kidney stones        No orders of the defined types were placed in this encounter.    1. Kidney stone:  -General risk factors for kidney stones and the conservative measures to prevent kidney stones in the future were discussed with the patient in detail.  The patient was encouraged to drink 2-3 liters of water a day, limit iced tea and pasquale as well as foods high in oxalate.  They were cautioned to try to limit salt and red meat intake.  We  also discussed adding citrate to the diet with the addition of atul or lemon juice to their water or alternatively with crystal light.   -Imaging review: as above.   -24 hour urine: 6/2022:  24 hour collection date: 6/17/22     Urine volume:  1.8L --Slightly low  (you need to increase your fluid intake, goal urine volume >2.5 liters/day)     PH:  5.9     Urinary Calcium:  140--normal      Urinary Oxalate:  77--quite high  (recommend dietary oxalate restriction)     Urinary citrate:  664--normal          Urinary uric acid: 0.631-- normal        Urinary sodium:  168--normal         -seeing nephrology----going to repeat 24 hour urine with nephrology.

## 2022-11-30 NOTE — PROGRESS NOTES
KELSEA inbasket msg received on 11/29/2022 from Dr. Nick office requesting to have a colonoscopy referral placed for [facility].

## 2022-12-01 ENCOUNTER — APPOINTMENT (OUTPATIENT)
Dept: RADIATION THERAPY | Facility: OTHER | Age: 67
End: 2022-12-01
Attending: STUDENT IN AN ORGANIZED HEALTH CARE EDUCATION/TRAINING PROGRAM
Payer: MEDICARE

## 2022-12-02 ENCOUNTER — TELEPHONE (OUTPATIENT)
Dept: ENDOSCOPY | Facility: HOSPITAL | Age: 67
End: 2022-12-02
Payer: MEDICARE

## 2022-12-02 DIAGNOSIS — C15.5 MALIGNANT NEOPLASM OF LOWER THIRD OF ESOPHAGUS: Primary | ICD-10-CM

## 2022-12-02 NOTE — TELEPHONE ENCOUNTER
----- Message from Michelle Reilly sent at 12/2/2022 12:27 PM CST -----  Contact: @[phone]  Pt is calling in stating that he hi returning a missed call , please call to discuss further.

## 2022-12-02 NOTE — TELEPHONE ENCOUNTER
----- Message from Darian Main MD sent at 12/2/2022 12:28 PM CST -----  Yes, 45 min please  ----- Message -----  From: Jennifer Trujillo MA  Sent: 12/2/2022  12:23 PM CST  To: Darian Main MD    FYI-Ok for Anatoly?  ----- Message -----  From: Juan Gonzales RN  Sent: 12/2/2022  12:21 PM CST  To: [facility] 2nd Floor    Good Morning,    Urgent referral placed today 12/2/22 in urgent work queue from Dr. Gonzales for EUS due to esophageal cancer at GE junction.    Thanks,    Juan

## 2022-12-05 ENCOUNTER — TELEPHONE (OUTPATIENT)
Dept: ENDOSCOPY | Facility: HOSPITAL | Age: 67
End: 2022-12-05
Payer: MEDICARE

## 2022-12-05 DIAGNOSIS — C15.9 MALIGNANT NEOPLASM OF ESOPHAGUS, UNSPECIFIED LOCATION: Primary | ICD-10-CM

## 2022-12-05 LAB
FINAL PATHOLOGIC DIAGNOSIS: ABNORMAL
GROSS: ABNORMAL
Lab: ABNORMAL
SUPPLEMENTAL DIAGNOSIS: ABNORMAL

## 2022-12-05 NOTE — TELEPHONE ENCOUNTER
Sravani Harley, RN 1 hour ago (3:30 PM)     KA  Patient is able to hold Xarelto for 2 days prior per Dr. Nick.         Note

## 2022-12-05 NOTE — TELEPHONE ENCOUNTER
Good afternoon, Dr. Nick,    Pt is scheduled for an EUS with Dr. Main, this Wednesday, 12/7/22, at [facility].  Can pt hold his Xarelto for 2 days prior to procedure per endoscopy protocol?  Please advise.    Thank you

## 2022-12-06 ENCOUNTER — TELEPHONE (OUTPATIENT)
Dept: SURGERY | Facility: CLINIC | Age: 67
End: 2022-12-06
Payer: MEDICARE

## 2022-12-06 ENCOUNTER — TELEPHONE (OUTPATIENT)
Dept: ENDOSCOPY | Facility: HOSPITAL | Age: 67
End: 2022-12-06
Payer: MEDICARE

## 2022-12-06 DIAGNOSIS — C15.9 ESOPHAGEAL ADENOCARCINOMA: Primary | ICD-10-CM

## 2022-12-06 NOTE — TELEPHONE ENCOUNTER
Spoke with patient about arrival time @ 1000.   Covid test = boosted    NPO status reviewed: Patient may eat until 7:00pm.  After 7pm, pt may have CLEAR liquids ONLY until completely NPO at Midnight.    Medications: Do not take Insulin or oral diabetic medications the day of the procedure.    Take as prescribed: heart, seizure and blood pressure medication in the morning with a sip of water (less than an ounce).  Take any breathing medications and bring inhalers to hospital with you.     Leave all valuables and jewelry at home. Wear comfortable clothes to procedure to change into hospital gown.   You cannot drive for 24 hours after your procedure because you will receive sedation for your procedure to make you comfortable.    A ride must be provided at discharge.

## 2022-12-07 ENCOUNTER — HOSPITAL ENCOUNTER (OUTPATIENT)
Facility: HOSPITAL | Age: 67
Discharge: HOME | End: 2022-12-07
Attending: INTERNAL MEDICINE | Admitting: INTERNAL MEDICINE
Payer: MEDICARE

## 2022-12-07 ENCOUNTER — ANESTHESIA EVENT (OUTPATIENT)
Dept: ENDOSCOPY | Facility: HOSPITAL | Age: 67
End: 2022-12-07
Payer: MEDICARE

## 2022-12-07 ENCOUNTER — ANESTHESIA (OUTPATIENT)
Dept: ENDOSCOPY | Facility: HOSPITAL | Age: 67
End: 2022-12-07
Payer: MEDICARE

## 2022-12-07 VITALS
OXYGEN SATURATION: 100 % | WEIGHT: 263 LBS | DIASTOLIC BLOOD PRESSURE: 61 MMHG | SYSTOLIC BLOOD PRESSURE: 123 MMHG | RESPIRATION RATE: 17 BRPM | BODY MASS INDEX: 35.62 KG/M2 | HEIGHT: 72 IN | TEMPERATURE: 99 F | HEART RATE: 70 BPM

## 2022-12-07 DIAGNOSIS — K22.89 ESOPHAGEAL MASS: ICD-10-CM

## 2022-12-07 LAB
GLUCOSE SERPL-MCNC: 134 MG/DL (ref 70–110)
POCT GLUCOSE: 134 MG/DL (ref 70–110)

## 2022-12-07 PROCEDURE — 25000003 PHARM REV CODE 250: Performed by: NURSE ANESTHETIST, CERTIFIED REGISTERED

## 2022-12-07 PROCEDURE — 37000009 HC ANESTHESIA EA ADD 15 MINS: Performed by: INTERNAL MEDICINE

## 2022-12-07 PROCEDURE — 43259 PR ENDOSCOPIC ULTRASOUND EXAM: ICD-10-PCS | Mod: ,,, | Performed by: INTERNAL MEDICINE

## 2022-12-07 PROCEDURE — 63600175 PHARM REV CODE 636 W HCPCS: Performed by: NURSE ANESTHETIST, CERTIFIED REGISTERED

## 2022-12-07 PROCEDURE — 43259 EGD US EXAM DUODENUM/JEJUNUM: CPT | Performed by: INTERNAL MEDICINE

## 2022-12-07 PROCEDURE — 25000003 PHARM REV CODE 250: Performed by: INTERNAL MEDICINE

## 2022-12-07 PROCEDURE — D9220A PRA ANESTHESIA: Mod: ANES,,, | Performed by: STUDENT IN AN ORGANIZED HEALTH CARE EDUCATION/TRAINING PROGRAM

## 2022-12-07 PROCEDURE — D9220A PRA ANESTHESIA: ICD-10-PCS | Mod: CRNA,,, | Performed by: NURSE ANESTHETIST, CERTIFIED REGISTERED

## 2022-12-07 PROCEDURE — 37000008 HC ANESTHESIA 1ST 15 MINUTES: Performed by: INTERNAL MEDICINE

## 2022-12-07 PROCEDURE — D9220A PRA ANESTHESIA: Mod: CRNA,,, | Performed by: NURSE ANESTHETIST, CERTIFIED REGISTERED

## 2022-12-07 PROCEDURE — D9220A PRA ANESTHESIA: ICD-10-PCS | Mod: ANES,,, | Performed by: STUDENT IN AN ORGANIZED HEALTH CARE EDUCATION/TRAINING PROGRAM

## 2022-12-07 PROCEDURE — 43259 EGD US EXAM DUODENUM/JEJUNUM: CPT | Mod: ,,, | Performed by: INTERNAL MEDICINE

## 2022-12-07 RX ORDER — PROPOFOL 10 MG/ML
VIAL (ML) INTRAVENOUS CONTINUOUS PRN
Status: DISCONTINUED | OUTPATIENT
Start: 2022-12-07 | End: 2022-12-07

## 2022-12-07 RX ORDER — LIDOCAINE HYDROCHLORIDE 20 MG/ML
INJECTION INTRAVENOUS
Status: DISCONTINUED | OUTPATIENT
Start: 2022-12-07 | End: 2022-12-07

## 2022-12-07 RX ORDER — PROPOFOL 10 MG/ML
VIAL (ML) INTRAVENOUS
Status: DISCONTINUED | OUTPATIENT
Start: 2022-12-07 | End: 2022-12-07

## 2022-12-07 RX ORDER — SODIUM CHLORIDE 0.9 % (FLUSH) 0.9 %
10 SYRINGE (ML) INJECTION
Status: DISCONTINUED | OUTPATIENT
Start: 2022-12-07 | End: 2022-12-07 | Stop reason: HOSPADM

## 2022-12-07 RX ORDER — SODIUM CHLORIDE 9 MG/ML
INJECTION, SOLUTION INTRAVENOUS CONTINUOUS
Status: DISCONTINUED | OUTPATIENT
Start: 2022-12-07 | End: 2022-12-07 | Stop reason: HOSPADM

## 2022-12-07 RX ADMIN — GLYCOPYRROLATE 0.2 MG: 0.2 INJECTION, SOLUTION INTRAMUSCULAR; INTRAVITREAL at 10:12

## 2022-12-07 RX ADMIN — PROPOFOL 175 MCG/KG/MIN: 10 INJECTION, EMULSION INTRAVENOUS at 10:12

## 2022-12-07 RX ADMIN — SODIUM CHLORIDE: 0.9 INJECTION, SOLUTION INTRAVENOUS at 10:12

## 2022-12-07 RX ADMIN — LIDOCAINE HYDROCHLORIDE 100 MG: 20 INJECTION, SOLUTION INTRAVENOUS at 10:12

## 2022-12-07 RX ADMIN — PROPOFOL 75 MG: 10 INJECTION, EMULSION INTRAVENOUS at 10:12

## 2022-12-07 NOTE — ANESTHESIA POSTPROCEDURE EVALUATION
Anesthesia Post Evaluation    Patient: Lukasz Person    Procedure(s) Performed: Procedure(s) (LRB):  ULTRASOUND, UPPER GI TRACT, ENDOSCOPIC (N/A)    Final Anesthesia Type: general      Patient location during evaluation: GI PACU  Patient participation: Yes- Able to Participate  Level of consciousness: awake and alert and awake  Post-procedure vital signs: reviewed and stable  Pain management: adequate  Airway patency: patent    PONV status at discharge: No PONV  Anesthetic complications: no      Cardiovascular status: blood pressure returned to baseline, hemodynamically stable and stable  Respiratory status: unassisted, spontaneous ventilation and room air  Hydration status: euvolemic  Follow-up not needed.          Vitals Value Taken Time       No case tracking events are documented in the log.      Pain/Jeovanny Score: No data recorded

## 2022-12-07 NOTE — TRANSFER OF CARE
Anesthesia Transfer of Care Note    Patient: Lukasz Person    Procedure(s) Performed: Procedure(s) (LRB):  ULTRASOUND, UPPER GI TRACT, ENDOSCOPIC (N/A)    Patient location: GI    Anesthesia Type: general    Transport from OR: Transported from OR on room air with adequate spontaneous ventilation    Post pain: adequate analgesia    Post assessment: no apparent anesthetic complications and tolerated procedure well    Post vital signs: stable    Level of consciousness: awake and alert    Nausea/Vomiting: no nausea/vomiting    Complications: none    Transfer of care protocol was followed      Last vitals:   Visit Vitals  /62 (BP Location: Left arm, Patient Position: Lying)   Pulse (!) 57   Temp 37.2 °C (99 °F) (Temporal)   Resp 18   Ht 6' (1.829 m)   Wt 119.3 kg (263 lb)   SpO2 (!) 93%   BMI 35.67 kg/m²

## 2022-12-07 NOTE — PLAN OF CARE
Patient recovered to baseline.  VSS. Patient seen by MD at bedside.  Discharge instructions reviewed with patient.  Federal cures act read and explained to patient. Patient verbalized understanding.  Patient escorted out via WC w/staff member and discharged w/family.

## 2022-12-07 NOTE — ANESTHESIA PREPROCEDURE EVALUATION
12/07/2022  Lukasz Person is a 67 y.o., male.  Pre-operative evaluation for Procedure(s) (LRB):  EGD (ESOPHAGOGASTRODUODENOSCOPY) (N/A)    Lukasz Person is a 67 y.o. male     Patient Active Problem List   Diagnosis    Type 2 diabetes mellitus with kidney complication, without long-term current use of insulin    KUMAR on CPAP    Gout    Hyperparathyroidism, primary    Hypertension associated with diabetes    Hyperlipidemia associated with type 2 diabetes mellitus    Severe obesity (BMI 35.0-35.9 with comorbidity)    Obesity, diabetes, and hypertension syndrome    DM type 2 without retinopathy    Diabetes mellitus with cataract    Male hypogonadism    Coronary artery disease involving native coronary artery of native heart    Low serum alkaline phosphatase    BPH with urinary obstruction    Erectile dysfunction due to arterial insufficiency    Chronic left shoulder pain    Nephrolithiasis    Paroxysmal atrial fibrillation    Chronic anticoagulation    HFrEF (heart failure with reduced ejection fraction)    Long term current use of amiodarone    Non-ischemic cardiomyopathy    CKD stage 3 due to type 2 diabetes mellitus       Review of patient's allergies indicates:  No Known Allergies    Current Facility-Administered Medications on File Prior to Visit   Medication Dose Route Frequency Provider Last Rate Last Admin    0.9%  NaCl infusion   Intravenous Continuous Darian Main MD 20 mL/hr at 12/07/22 1033 New Bag at 12/07/22 1033    sodium chloride 0.9% flush 10 mL  10 mL Intravenous PRN Darian Main MD         Current Outpatient Medications on File Prior to Visit   Medication Sig Dispense Refill    ACCU-CHEK SOFTCLIX LANCETS Misc USE EVERY  each 6    allopurinoL (ZYLOPRIM) 100 MG tablet TAKE 1 TABLET BY MOUTH EVERY DAY 30 tablet 10    blood sugar diagnostic (ACCU-CHEK ANTONELLA PLUS  TEST STRP) Strp Inject 1 strip into the skin 2 (two) times daily before meals. 100 strip 11    blood-glucose meter kit Checks blood sugars 1x/daily. 1 each 12    glimepiride (AMARYL) 2 MG tablet TAKE 2 TABLETS BY MOUTH EVERY MORNING 180 tablet 2    lisinopriL-hydrochlorothiazide (PRINZIDE,ZESTORETIC) 20-25 mg Tab TAKE 1 TABLET BY MOUTH EVERY DAY 90 tablet 3    metFORMIN (GLUCOPHAGE) 500 MG tablet TAKE 2 TABLETS BY MOUTH EVERY MORNING AND 2 TABLETS WITH DINNER 120 tablet 10    metoprolol succinate (TOPROL-XL) 25 MG 24 hr tablet Take 1 tablet (25 mg total) by mouth once daily. 30 tablet 11    nitroGLYCERIN (NITROSTAT) 0.4 MG SL tablet Place 1 tablet (0.4 mg total) under the tongue every 5 (five) minutes as needed for Chest pain. If you need a third tablet, call 911 60 tablet 12    omeprazole (PRILOSEC) 40 MG capsule Take 1 capsule (40 mg total) by mouth once daily. 90 capsule 3    potassium citrate (UROCIT-K) 10 mEq (1,080 mg) TbSR Take 1 tablet (10 mEq total) by mouth 3 (three) times daily with meals. 90 tablet 9    rivaroxaban (XARELTO) 20 mg Tab Take 1 tablet (20 mg total) by mouth daily with dinner or evening meal. 30 tablet 11    rosuvastatin (CRESTOR) 20 MG tablet TAKE 1 TABLET(20 MG) BY MOUTH EVERY DAY 30 tablet 11    tamsulosin (FLOMAX) 0.4 mg Cap Take 1 capsule (0.4 mg total) by mouth once daily. 30 capsule 11    testosterone (ANDROGEL) 20.25 mg/1.25 gram (1.62 %) GlPm Apply 4 pumps to shoulders daily 2 each 5       Past Surgical History:   Procedure Laterality Date    CORONARY ANGIOGRAPHY N/A 9/30/2020    Procedure: ANGIOGRAM, CORONARY ARTERY;  Surgeon: John West MD;  Location: [facility] CATH LAB;  Service: Cardiology;  Laterality: N/A;    CYSTOSCOPY N/A 2/17/2022    Procedure: CYSTOSCOPY;  Surgeon: Lukasz Hughes MD;  Location: [facility] OR [address] [place] [address];  Service: Urology;  Laterality: N/A;    CYSTOSCOPY W/ URETERAL STENT PLACEMENT N/A 2/25/2022    Procedure: CYSTOSCOPY, WITH URETERAL STENT  INSERTION;  Surgeon: Lukasz Hughes MD;  Location: [facility] OR [facility] FLR;  Service: Urology;  Laterality: N/A;    ESOPHAGOGASTRODUODENOSCOPY N/A 11/17/2022    Procedure: EGD (ESOPHAGOGASTRODUODENOSCOPY);  Surgeon: Brody Gonzales MD;  Location: [facility] ENDO (2ND FLR);  Service: Endoscopy;  Laterality: N/A;  inst via email-ok to hold Xarelto x 2 days-MS    LASER LITHOTRIPSY Left 2/25/2022    Procedure: LITHOTRIPSY, USING LASER;  Surgeon: Lukasz Hughes MD;  [facility];  Service: Urology;  Laterality: Left;    LEFT HEART CATHETERIZATION Left 9/30/2020    Procedure: Left heart cath;  Surgeon: John West MD;  Location: [facility] CATH LAB;  Service: Cardiology;  Laterality: Left;    LITHOTRIPSY      PARATHYROIDECTOMY  1/1/2-107    PYELOSCOPY Left 2/25/2022    Procedure: PYELOSCOPY;  Surgeon: Lukasz Hughes MD;  [facility];  Service: Urology;  Laterality: Left;    TRANSESOPHAGEAL ECHOCARDIOGRAPHY N/A 4/7/2022    Procedure: ECHOCARDIOGRAM, TRANSESOPHAGEAL;  Surgeon: Xander Diagnostic Provider;  Location: [facility] EP LAB;  Service: Cardiology;  Laterality: N/A;    TREATMENT OF CARDIAC ARRHYTHMIA N/A 4/7/2022    Procedure: Cardioversion or Defibrillation;  Surgeon: Iris Fisher NP;  Location: [facility] EP LAB;  Service: Cardiology;  Laterality: N/A;  afib, dccv, artie, anes, EH, 3prep    URETEROSCOPIC REMOVAL OF URETERIC CALCULUS Left 2/25/2022    Procedure: REMOVAL, CALCULUS, URETER, URETEROSCOPIC;  Surgeon: Lukasz Hughes MD;  [facility];  Service: Urology;  Laterality: Left;    URETEROSCOPY Left 2/25/2022    Procedure: URETEROSCOPY;  Surgeon: Lukasz Hughes MD;  [address];  Service: Urology;  Laterality: Left;       Social History     Socioeconomic History    Marital status:    Tobacco Use    Smoking status: Former     Packs/day: 1.00     Years: 7.00     Pack years: 7.00     Types: Cigarettes, Cigars     Start date: 9/1/1972     Quit  date:      Years since quittin.5    Smokeless tobacco: Never    Tobacco comments:     smoking was off and on.  cumulative 7 years.  never more than three years in one stretch or more lj   Substance and Sexual Activity    Alcohol use: Yes     Alcohol/week: 3.0 standard drinks     Types: 2 Cans of beer, 1 Shots of liquor per week     Comment: don't drink regularly.  6 to 7 monthly    Drug use: Not Currently     Types: Marijuana    Sexual activity: Not Currently     Partners: Female     Birth control/protection: None     Comment:      Social Determinants of Health     Financial Resource Strain: Low Risk     Difficulty of Paying Living Expenses: Not very hard   Food Insecurity: No Food Insecurity    Worried About Running Out of Food in the Last Year: Never true    Ran Out of Food in the Last Year: Never true   Transportation Needs: No Transportation Needs    Lack of Transportation (Medical): No    Lack of Transportation (Non-Medical): No   Physical Activity: Unknown    Days of Exercise per Week: Patient refused    Minutes of Exercise per Session: 40 min   Stress: No Stress Concern Present    Feeling of Stress : Only a little   Social Connections: Unknown    Frequency of Communication with Friends and Family: Once a week    Frequency of Social Gatherings with Friends and Family: Once a week    Active Member of Clubs or Organizations: Yes    Attends Club or Organization Meetings: More than 4 times per year    Marital Status:    Housing Stability: Low Risk     Unable to Pay for Housing in the Last Year: No    Number of Places Lived in the Last Year: 1    Unstable Housing in the Last Year: No           Pre-op Assessment    I have reviewed the Patient Summary Reports.     I have reviewed the Nursing Notes.    I have reviewed the Medications.     Review of Systems  Anesthesia Hx:  No problems with previous Anesthesia  History of prior surgery of interest to airway management or  planning: Denies Family Hx of Anesthesia complications.   Denies Personal Hx of Anesthesia complications.   Social:  Non-Smoker    Hematology/Oncology:  Hematology Normal   Oncology Normal     EENT/Dental:EENT/Dental Normal   Cardiovascular:   Hypertension CAD      Pulmonary:   Sleep Apnea, CPAP    Renal/:   Chronic Renal Disease    Hepatic/GI:  Hepatic/GI Normal    Musculoskeletal:  Musculoskeletal Normal    Neurological:  Neurology Normal    Endocrine:   Diabetes    Psych:  Psychiatric Normal           Physical Exam  General: Well nourished and Cooperative    Airway:  Mallampati: III   Mouth Opening: Normal  TM Distance: Normal  Tongue: Normal  Neck ROM: Normal ROM    Dental:  Intact    Chest/Lungs:  Clear to auscultation, Normal Respiratory Rate    Heart:  Rate: Normal  Rhythm: Regular Rhythm  Sounds: Normal        Anesthesia Plan  Type of Anesthesia, risks & benefits discussed:    Anesthesia Type: Gen Natural Airway, MAC  Intra-op Monitoring Plan: Standard ASA Monitors  Post Op Pain Control Plan: multimodal analgesia  Induction:  IV  Informed Consent: Informed consent signed with the Patient and all parties understand the risks and agree with anesthesia plan.  All questions answered.   ASA Score: 3    Ready For Surgery From Anesthesia Perspective.     .      Lab Results   Component Value Date    WBC 5.57 11/07/2022    HGB 14.7 11/07/2022    HCT 47.3 11/07/2022     11/07/2022    CHOL 107 (L) 11/07/2022    TRIG 218 (H) 11/07/2022    HDL 34 (L) 11/07/2022    ALT 23 11/07/2022    AST 26 11/07/2022     11/07/2022    K 5.1 11/07/2022     11/07/2022    CREATININE 1.5 (H) 11/07/2022    BUN 17 11/07/2022    CO2 28 11/07/2022    TSH 2.537 11/07/2022    PSA 1.2 10/06/2021    INR 1.1 04/01/2022    HGBA1C 6.9 (H) 11/07/2022

## 2022-12-07 NOTE — [employer] PATIENT INSTRUCTIONS
Discharge Summary/Instructions after an Endoscopic Procedure  Patient Name: Lukasz Person  Patient MRN: [id number]  Patient YOB: 1955  Wednesday, December 7, 2022  Darian Main MD  Dear patient,  As a result of recent federal legislation (The Federal Cures Act), you may   receive lab or pathology results from your procedure in your [facility]   account before your physician is able to contact you. Your physician or   their representative will relay the results to you with their   recommendations at their soonest availability.  Thank you,  Your health is very important to us during the Covid Crisis. Following your   procedure today, you will receive a daily text for 2 weeks asking about   signs or symptoms of Covid 19.  Please respond to this text when you   receive it so we can follow up and keep you as safe as possible.   RESTRICTIONS:  During your procedure today, you received medications for sedation.  These   medications may affect your judgment, balance and coordination.  Therefore,   for 24 hours, you have the following restrictions:   - DO NOT drive a car, operate machinery, make legal/financial decisions,   sign important papers or drink alcohol.    ACTIVITY:  Today: no heavy lifting, straining or running due to procedural   sedation/anesthesia.  The following day: return to full activity including work.  DIET:  Eat and drink normally unless instructed otherwise.     TREATMENT FOR COMMON SIDE EFFECTS:  - Mild abdominal pain, nausea, belching, bloating or excessive gas:  rest,   eat lightly and use a heating pad.  - Sore Throat: treat with throat lozenges and/or gargle with warm salt   water.  - Because air was used during the procedure, expelling large amounts of air   from your rectum or belching is normal.  - If a bowel prep was taken, you may not have a bowel movement for 1-3 days.    This is normal.  SYMPTOMS TO WATCH FOR AND REPORT TO YOUR PHYSICIAN:  1. Abdominal pain or bloating, other than  gas cramps.  2. Chest pain.  3. Back pain.  4. Signs of infection such as: chills or fever occurring within 24 hours   after the procedure.  5. Rectal bleeding, which would show as bright red, maroon, or black stools.   (A tablespoon of blood from the rectum is not serious, especially if   hemorrhoids are present.)  6. Vomiting.  7. Weakness or dizziness.  GO DIRECTLY TO THE NEAREST EMERGENCY ROOM IF YOU HAVE ANY OF THE FOLLOWING:      Difficulty breathing              Chills and/or fever over 101 F   Persistent vomiting and/or vomiting blood   Severe abdominal pain   Severe chest pain   Black, tarry stools   Bleeding- more than one tablespoon   Any other symptom or condition that you feel may need urgent attention  Your doctor recommends these additional instructions:  If any biopsies were taken, your doctors clinic will contact you in 1 to 2   weeks with any results.  - Discharge patient to home.   - Resume previous diet.   - Continue present medications.   - Resume Plavix (clopidogrel) at prior dose today.   - Return to referring physician as previously scheduled.  For questions, problems or results please call your physician - Darian Main MD.  EMERGENCY PHONE NUMBER: [phone],  LAB RESULTS: [phone]  IF A COMPLICATION OR EMERGENCY SITUATION ARISES AND YOU ARE UNABLE TO REACH   YOUR PHYSICIAN - GO DIRECTLY TO THE EMERGENCY ROOM.  Darian Main MD  12/7/2022 11:11:01 AM  This report has been verified and signed electronically.  Dear patient,  As a result of recent federal legislation (The Federal Cures Act), you may   receive lab or pathology results from your procedure in your [facility]   account before your physician is able to contact you. Your physician or   their representative will relay the results to you with their   recommendations at their soonest availability.  Thank you,  [employer]

## 2022-12-07 NOTE — H&P
Short Stay Endoscopy History and Physical    PCP - Kirk Wilson MD  Referring Physician - Darian Main MD  [address]  [address]  [place] [address]    Procedure - eus  ASA - per anesthesia  Mallampati - per anesthesia  History of Anesthesia problems - no  Family history Anesthesia problems -  no   Plan of anesthesia - General    HPI:  This is a 67 y.o. male here for evaluation of: esophageal cancer    Reflux - no  Dysphagia - no  Abdominal pain - no  Diarrhea - no    ROS:  Constitutional: No fevers, chills, No weight loss  CV: No chest pain  Pulm: No cough, No shortness of breath  Ophtho: No vision changes  GI: see HPI  Derm: No rash    Medical History:  has a past medical history of Anticoagulant long-term use, Diabetes mellitus, Hyperlipidemia, Hypertension, Kidney stones, and Sleep apnea.    Surgical History:  has a past surgical history that includes Lithotripsy; Parathyroidectomy (1/1/2-107); Left heart catheterization (Left, 9/30/2020); Coronary angiography (N/A, 9/30/2020); Cystoscopy (N/A, 2/17/2022); Ureteroscopic removal of ureteric calculus (Left, 2/25/2022); Laser lithotripsy (Left, 2/25/2022); Cystoscopy w/ ureteral stent placement (N/A, 2/25/2022); Pyeloscopy (Left, 2/25/2022); Ureteroscopy (Left, 2/25/2022); Treatment of cardiac arrhythmia (N/A, 4/7/2022); Transesophageal echocardiography (N/A, 4/7/2022); and Esophagogastroduodenoscopy (N/A, 11/17/2022).    Family History: family history includes Alcohol abuse in his paternal aunt; Arthritis in his brother, father, mother, sister, and sister; Cancer in his maternal grandfather and paternal grandfather; Colon cancer in his paternal grandfather; Colon cancer (age of onset: 32) in his brother; Colon polyps in his paternal grandfather; Diabetes in his father and paternal grandmother; Heart disease (age of onset: 70) in his father; Hypertension in his sister; Kidney disease in his father..    Social History:  reports that he quit smoking  about 27 years ago. His smoking use included cigarettes and cigars. He started smoking about 50 years ago. He has a 7.00 pack-year smoking history. He has never used smokeless tobacco. He reports current alcohol use of about 3.0 standard drinks per week. He reports that he does not currently use drugs after having used the following drugs: Marijuana.    Review of patient's allergies indicates:  No Known Allergies    Medications:   Medications Prior to Admission   Medication Sig Dispense Refill Last Dose    allopurinoL (ZYLOPRIM) 100 MG tablet TAKE 1 TABLET BY MOUTH EVERY DAY 30 tablet 10 Past Week    glimepiride (AMARYL) 2 MG tablet TAKE 2 TABLETS BY MOUTH EVERY MORNING 180 tablet 2 Past Week    lisinopriL-hydrochlorothiazide (PRINZIDE,ZESTORETIC) 20-25 mg Tab TAKE 1 TABLET BY MOUTH EVERY DAY 90 tablet 3 12/7/2022 at 0700    metFORMIN (GLUCOPHAGE) 500 MG tablet TAKE 2 TABLETS BY MOUTH EVERY MORNING AND 2 TABLETS WITH DINNER 120 tablet 10 Past Week    metoprolol succinate (TOPROL-XL) 25 MG 24 hr tablet Take 1 tablet (25 mg total) by mouth once daily. 30 tablet 11 12/6/2022    omeprazole (PRILOSEC) 40 MG capsule Take 1 capsule (40 mg total) by mouth once daily. 90 capsule 3 12/6/2022    potassium citrate (UROCIT-K) 10 mEq (1,080 mg) TbSR Take 1 tablet (10 mEq total) by mouth 3 (three) times daily with meals. 90 tablet 9 Past Week    rosuvastatin (CRESTOR) 20 MG tablet TAKE 1 TABLET(20 MG) BY MOUTH EVERY DAY 30 tablet 11 12/6/2022    tamsulosin (FLOMAX) 0.4 mg Cap Take 1 capsule (0.4 mg total) by mouth once daily. 30 capsule 11 12/6/2022    testosterone (ANDROGEL) 20.25 mg/1.25 gram (1.62 %) GlPm Apply 4 pumps to shoulders daily 2 each 5 12/7/2022 at 0700    ACCU-CHEK SOFTCLIX LANCETS Misc USE EVERY  each 6     blood sugar diagnostic (ACCU-CHEK ANTONELLA PLUS TEST STRP) Strp Inject 1 strip into the skin 2 (two) times daily before meals. 100 strip 11     blood-glucose meter kit Checks blood sugars 1x/daily. 1 each 12      nitroGLYCERIN (NITROSTAT) 0.4 MG SL tablet Place 1 tablet (0.4 mg total) under the tongue every 5 (five) minutes as needed for Chest pain. If you need a third tablet, call 911 60 tablet 12 More than a month    rivaroxaban (XARELTO) 20 mg Tab Take 1 tablet (20 mg total) by mouth daily with dinner or evening meal. 30 tablet 11 12/4/2022       Physical Exam:    Vital Signs:   Vitals:    12/07/22 1031   BP: 133/62   Pulse: (!) 57   Resp: 18   Temp: 99 °F (37.2 °C)       General Appearance: Well appearing in no acute distress    Labs:  Lab Results   Component Value Date    WBC 5.57 11/07/2022    HGB 14.7 11/07/2022    HCT 47.3 11/07/2022     11/07/2022    CHOL 107 (L) 11/07/2022    TRIG 218 (H) 11/07/2022    HDL 34 (L) 11/07/2022    ALT 23 11/07/2022    AST 26 11/07/2022     11/07/2022    K 5.1 11/07/2022     11/07/2022    CREATININE 1.5 (H) 11/07/2022    BUN 17 11/07/2022    CO2 28 11/07/2022    TSH 2.537 11/07/2022    PSA 1.2 10/06/2021    INR 1.1 04/01/2022    HGBA1C 6.9 (H) 11/07/2022       I have explained the risks and benefits of this endoscopic procedure to the patient including but not limited to bleeding, inflammation, infection, perforation, and death.      Darian Main MD

## 2022-12-08 ENCOUNTER — HOSPITAL ENCOUNTER (OUTPATIENT)
Dept: RADIOLOGY | Facility: HOSPITAL | Age: 67
Discharge: HOME | End: 2022-12-08
Attending: INTERNAL MEDICINE
Payer: MEDICARE

## 2022-12-08 ENCOUNTER — OFFICE VISIT (OUTPATIENT)
Dept: INTERNAL MEDICINE | Facility: CLINIC | Age: 67
End: 2022-12-08
Payer: MEDICARE

## 2022-12-08 ENCOUNTER — TELEPHONE (OUTPATIENT)
Dept: ENDOSCOPY | Facility: HOSPITAL | Age: 67
End: 2022-12-08
Payer: MEDICARE

## 2022-12-08 VITALS
HEART RATE: 56 BPM | HEIGHT: 72 IN | BODY MASS INDEX: 35.03 KG/M2 | TEMPERATURE: 98 F | OXYGEN SATURATION: 96 % | WEIGHT: 258.63 LBS | DIASTOLIC BLOOD PRESSURE: 80 MMHG | SYSTOLIC BLOOD PRESSURE: 130 MMHG

## 2022-12-08 DIAGNOSIS — C15.9 ESOPHAGEAL ADENOCARCINOMA: ICD-10-CM

## 2022-12-08 DIAGNOSIS — C15.9 ESOPHAGEAL ADENOCARCINOMA: Primary | ICD-10-CM

## 2022-12-08 DIAGNOSIS — E11.22 TYPE 2 DIABETES MELLITUS WITH STAGE 3 CHRONIC KIDNEY DISEASE, WITHOUT LONG-TERM CURRENT USE OF INSULIN, UNSPECIFIED WHETHER STAGE 3A OR 3B CKD: ICD-10-CM

## 2022-12-08 DIAGNOSIS — N18.30 TYPE 2 DIABETES MELLITUS WITH STAGE 3 CHRONIC KIDNEY DISEASE, WITHOUT LONG-TERM CURRENT USE OF INSULIN, UNSPECIFIED WHETHER STAGE 3A OR 3B CKD: ICD-10-CM

## 2022-12-08 DIAGNOSIS — Z12.11 SCREEN FOR COLON CANCER: ICD-10-CM

## 2022-12-08 LAB — POCT GLUCOSE: 117 MG/DL (ref 70–110)

## 2022-12-08 PROCEDURE — 78815 PET IMAGE W/CT SKULL-THIGH: CPT | Mod: 26,PI,, | Performed by: RADIOLOGY

## 2022-12-08 PROCEDURE — 99214 OFFICE O/P EST MOD 30 MIN: CPT | Mod: S$PBB,,, | Performed by: INTERNAL MEDICINE

## 2022-12-08 PROCEDURE — 99999 PR PBB SHADOW E&M-EST. PATIENT-LVL V: CPT | Mod: PBBFAC,,, | Performed by: INTERNAL MEDICINE

## 2022-12-08 PROCEDURE — 99214 PR OFFICE/OUTPT VISIT, EST, LEVL IV, 30-39 MIN: ICD-10-PCS | Mod: S$PBB,,, | Performed by: INTERNAL MEDICINE

## 2022-12-08 PROCEDURE — 99215 OFFICE O/P EST HI 40 MIN: CPT | Mod: PBBFAC,PO | Performed by: INTERNAL MEDICINE

## 2022-12-08 PROCEDURE — 99999 PR PBB SHADOW E&M-EST. PATIENT-LVL V: ICD-10-PCS | Mod: PBBFAC,,, | Performed by: INTERNAL MEDICINE

## 2022-12-08 PROCEDURE — 78815 PET IMAGE W/CT SKULL-THIGH: CPT | Mod: TC

## 2022-12-08 PROCEDURE — 78815 NM PET CT ROUTINE: ICD-10-PCS | Mod: 26,PI,, | Performed by: RADIOLOGY

## 2022-12-08 NOTE — PROGRESS NOTES
Subjective:       Patient ID: Lukasz Person is a 67 y.o. male.    Chief Complaint: Discuss Medical Concerns    HPI    67-year-old male here to discuss concerns.  Patient had endoscopy and like to discuss results.  He has marquez's esophagus and [place] Grade C esophagitis.  He had a biopsy and was diagnosed with adenocarcimona and suspicious for lymphovascular invasion.  He sees med onc Monday as well as the surgical oncologist Monday.  He has a PET scan scheduled for today.     His BG are in the 60s when he is shaky.  He would eat breakfast and skip lunch.  He would have diarrhea when he has these episodes.   Diabetes - amaryl 2 mg, metformin 500 mg BID.  Lab Results   Component Value Date    HGBA1C 6.9 (H) 11/07/2022    HGBA1C 6.4 (H) 10/04/2022    HGBA1C 6.8 (H) 05/02/2022     Lab Results   Component Value Date    LDLCALC 29.4 (L) 11/07/2022    CREATININE 1.5 (H) 11/07/2022     Review of Systems      Objective:      Physical Exam  Vitals reviewed.   Constitutional:       Appearance: He is well-developed.   HENT:      Head: Normocephalic and atraumatic.      Mouth/Throat:      Pharynx: No oropharyngeal exudate.   Eyes:      General: No scleral icterus.        Right eye: No discharge.         Left eye: No discharge.      Pupils: Pupils are equal, round, and reactive to light.   Neck:      Thyroid: No thyromegaly.      Trachea: No tracheal deviation.   Cardiovascular:      Rate and Rhythm: Normal rate and regular rhythm.      Heart sounds: Normal heart sounds. No murmur heard.    No friction rub. No gallop.   Pulmonary:      Effort: Pulmonary effort is normal. No respiratory distress.      Breath sounds: Normal breath sounds. No wheezing or rales.   Chest:      Chest wall: No tenderness.   Abdominal:      General: Bowel sounds are normal. There is no distension.      Palpations: Abdomen is soft. There is no mass.      Tenderness: There is no abdominal tenderness. There is no guarding or rebound.   Musculoskeletal:          General: No tenderness. Normal range of motion.      Cervical back: Normal range of motion and neck supple.   Skin:     General: Skin is warm and dry.      Coloration: Skin is not pale.      Findings: No erythema or rash.   Neurological:      Mental Status: He is alert and oriented to person, place, and time.   Psychiatric:         Behavior: Behavior normal.       Assessment:       1. Esophageal adenocarcinoma    2. Type 2 diabetes mellitus with stage 3 chronic kidney disease, without long-term current use of insulin, unspecified whether stage 3a or 3b CKD    3. Screen for colon cancer  - Ambulatory referral/consult to Endo Procedure ; Future    Plan:       1. Patient to let me know once he gets results of PET scan.  Agree with Oncology and Surgical Oncology followups.  2.  Continue metformin 500 mg b.i.d..  Continue Amaryl daily.  Patient to hold Amaryl if not eating.    3.  Refer for colonoscopy.

## 2022-12-09 ENCOUNTER — PATIENT MESSAGE (OUTPATIENT)
Dept: INTERNAL MEDICINE | Facility: CLINIC | Age: 67
End: 2022-12-09
Payer: MEDICARE

## 2022-12-12 ENCOUNTER — OFFICE VISIT (OUTPATIENT)
Dept: HEMATOLOGY/ONCOLOGY | Facility: CLINIC | Age: 67
End: 2022-12-12
Payer: MEDICARE

## 2022-12-12 ENCOUNTER — RESEARCH ENCOUNTER (OUTPATIENT)
Dept: RESEARCH | Facility: HOSPITAL | Age: 67
End: 2022-12-12
Payer: MEDICARE

## 2022-12-12 ENCOUNTER — OFFICE VISIT (OUTPATIENT)
Dept: SURGERY | Facility: CLINIC | Age: 67
End: 2022-12-12
Payer: MEDICARE

## 2022-12-12 VITALS
DIASTOLIC BLOOD PRESSURE: 65 MMHG | WEIGHT: 261.38 LBS | BODY MASS INDEX: 35.4 KG/M2 | SYSTOLIC BLOOD PRESSURE: 146 MMHG | HEIGHT: 72 IN | RESPIRATION RATE: 18 BRPM | OXYGEN SATURATION: 96 % | HEART RATE: 63 BPM | TEMPERATURE: 99 F

## 2022-12-12 VITALS
TEMPERATURE: 99 F | BODY MASS INDEX: 35.41 KG/M2 | DIASTOLIC BLOOD PRESSURE: 65 MMHG | SYSTOLIC BLOOD PRESSURE: 145 MMHG | OXYGEN SATURATION: 96 % | HEIGHT: 72 IN | WEIGHT: 261.44 LBS | HEART RATE: 63 BPM

## 2022-12-12 DIAGNOSIS — E11.69 HYPERLIPIDEMIA ASSOCIATED WITH TYPE 2 DIABETES MELLITUS: Chronic | ICD-10-CM

## 2022-12-12 DIAGNOSIS — D84.81 IMMUNODEFICIENCY SECONDARY TO NEOPLASM: ICD-10-CM

## 2022-12-12 DIAGNOSIS — R13.10 DYSPHAGIA, UNSPECIFIED TYPE: ICD-10-CM

## 2022-12-12 DIAGNOSIS — E11.22 TYPE 2 DIABETES MELLITUS WITH STAGE 3 CHRONIC KIDNEY DISEASE, WITHOUT LONG-TERM CURRENT USE OF INSULIN, UNSPECIFIED WHETHER STAGE 3A OR 3B CKD: Chronic | ICD-10-CM

## 2022-12-12 DIAGNOSIS — N18.30 TYPE 2 DIABETES MELLITUS WITH STAGE 3 CHRONIC KIDNEY DISEASE, WITHOUT LONG-TERM CURRENT USE OF INSULIN, UNSPECIFIED WHETHER STAGE 3A OR 3B CKD: Chronic | ICD-10-CM

## 2022-12-12 DIAGNOSIS — I50.20 HFREF (HEART FAILURE WITH REDUCED EJECTION FRACTION): Chronic | ICD-10-CM

## 2022-12-12 DIAGNOSIS — E78.5 HYPERLIPIDEMIA ASSOCIATED WITH TYPE 2 DIABETES MELLITUS: Chronic | ICD-10-CM

## 2022-12-12 DIAGNOSIS — C15.9 ESOPHAGEAL ADENOCARCINOMA: Primary | ICD-10-CM

## 2022-12-12 DIAGNOSIS — E11.59 HYPERTENSION ASSOCIATED WITH DIABETES: Chronic | ICD-10-CM

## 2022-12-12 DIAGNOSIS — I48.0 PAROXYSMAL ATRIAL FIBRILLATION: Chronic | ICD-10-CM

## 2022-12-12 DIAGNOSIS — N18.30 CKD STAGE 3 DUE TO TYPE 2 DIABETES MELLITUS: ICD-10-CM

## 2022-12-12 DIAGNOSIS — E11.22 CKD STAGE 3 DUE TO TYPE 2 DIABETES MELLITUS: ICD-10-CM

## 2022-12-12 DIAGNOSIS — I25.10 CORONARY ARTERY DISEASE INVOLVING NATIVE CORONARY ARTERY OF NATIVE HEART WITHOUT ANGINA PECTORIS: Chronic | ICD-10-CM

## 2022-12-12 DIAGNOSIS — Z79.01 CHRONIC ANTICOAGULATION: ICD-10-CM

## 2022-12-12 DIAGNOSIS — D49.9 IMMUNODEFICIENCY SECONDARY TO NEOPLASM: ICD-10-CM

## 2022-12-12 DIAGNOSIS — I15.2 HYPERTENSION ASSOCIATED WITH DIABETES: Chronic | ICD-10-CM

## 2022-12-12 PROCEDURE — 99214 OFFICE O/P EST MOD 30 MIN: CPT | Mod: PBBFAC,27 | Performed by: INTERNAL MEDICINE

## 2022-12-12 PROCEDURE — 99999 PR PBB SHADOW E&M-EST. PATIENT-LVL IV: CPT | Mod: PBBFAC,,, | Performed by: INTERNAL MEDICINE

## 2022-12-12 PROCEDURE — 99999 PR PBB SHADOW E&M-EST. PATIENT-LVL III: CPT | Mod: PBBFAC,,,

## 2022-12-12 PROCEDURE — 99205 OFFICE O/P NEW HI 60 MIN: CPT | Mod: S$PBB,,, | Performed by: INTERNAL MEDICINE

## 2022-12-12 PROCEDURE — 99999 PR PBB SHADOW E&M-EST. PATIENT-LVL III: ICD-10-PCS | Mod: PBBFAC,,,

## 2022-12-12 PROCEDURE — 99215 OFFICE O/P EST HI 40 MIN: CPT | Mod: S$PBB,,,

## 2022-12-12 PROCEDURE — 99215 PR OFFICE/OUTPT VISIT, EST, LEVL V, 40-54 MIN: ICD-10-PCS | Mod: S$PBB,,,

## 2022-12-12 PROCEDURE — 99213 OFFICE O/P EST LOW 20 MIN: CPT | Mod: PBBFAC

## 2022-12-12 PROCEDURE — 99205 PR OFFICE/OUTPT VISIT, NEW, LEVL V, 60-74 MIN: ICD-10-PCS | Mod: S$PBB,,, | Performed by: INTERNAL MEDICINE

## 2022-12-12 PROCEDURE — 99999 PR PBB SHADOW E&M-EST. PATIENT-LVL IV: ICD-10-PCS | Mod: PBBFAC,,, | Performed by: INTERNAL MEDICINE

## 2022-12-12 NOTE — PROGRESS NOTES
Encounter Date:  2022    Patient ID: Luaksz Person  Age:  67 y.o. :  1955     No chief complaint on file.    2022: PCP for progressive dysphagia  22: EGD:nodular mucosa in the esophagus at the GE junction. Esophageal mucosal changes suggestive of short-segment Paredes's esophagus. Biopsies positive for adenocarcinoma.   22: EUS: Partially obstructing, malignant esophageal tumor was found in the lower third of the esophagus staged T3 N0 Mx by endosonographic criteria.   22: PET CT: Distal esophageal wall thickening and hypermetabolic activity in keeping with known esophageal cancer with no definite evidence of metastatic disease    History:    Mr. Person is a 67 y.o. male who presents with newly diagnosed esophageal adenocarcinoma of the GE junction. He reports that beginning in late 2022 he noticed trouble swallowing certain foods- 2022 saw his PCP as this problem persisted. EGD noted a nodular mucosal change at  the GE junction. Biopsy taken revealed adenocarcinoma. Subsequent EUS noted a partially obstructing tumor in the lower third of the esophagus that was staged T3N0Mx by endosonographic criteria.     He reports that he is able to eat many food but avoids foods such as steak and breads as well as some liquids and carbonated beverages. He has lost 3-5 lbs since July. Reports usual activity and energy level. Denies N/V/D, chest pain, SOB.     Referred by: Dr. Medrano    Prior abd surgery: none  Family hx of CA- paternal and maternal grandfathers, brother- colon CA  Currently taking anticoagulation  Former smoked    Data:     Radiology:  Dr. Brown and I personally reviewed these images:   22: PET:   Impression:  Distal esophageal wall thickening and hypermetabolic activity in keeping with known esophageal cancer.     No definite evidence of metastatic disease.  Subcentimeter gastrohepatic and perigastric lymph nodes without appreciable uptake.    Endoscopy:    22:  EUS:  Impression:     - Esophageal mucosal changes consistent with                          Paredes's esophagus.                          - Partially obstructing, malignant esophageal                          tumor was found in the lower third of the                          esophagus.                          - Normal stomach.                          - Normal examined duodenum.                          - A mass was found in the lower third of the                          esophagus. A tissue diagnosis was obtained prior                          to this exam. This is of adenocarcinoma. This was                          staged T3 (based on invasion into) N0 Mx by                          endosonographic criteria.                          - No specimens collected.     11/17/22: EGD:   Impression:     - Friable (with contact bleeding), nodular mucosa                          in the esophagus at the GE junction, biopsied.                          - LA Grade C reflux esophagitis with no bleeding.                          - Esophageal mucosal changes suggestive of                          short-segment Paredes's esophagus.                          - Medium sized hiatus hernia.     Pathology:    11/17/22: From EGD:   Final Pathologic Diagnosis    Abnormal   Gastroesophageal junction, biopsy:   - Adenocarcinoma.   - Background intestinal metaplasia with low and high-grade dysplasia.   - Focally suspicious for lymphovascular invasion.   - MMR pending, results will be issued in addendum.   Immunohistochemistry (IHC) Testing for Mismatch Repair (MMR) Proteins:   MLH1 - Intact nuclear expression   MSH2 - Intact nuclear expression   MSH6 - Intact nuclear expression   PMS2 - Intact nuclear expression   Background nonneoplastic tissue/internal control with intact nuclear   expression   IHC Interpretation   No loss of nuclear expression of MMR proteins: low probability of   microsatellite instability     Labs:    Lab Results    Component Value Date    WBC 6.82 12/08/2022    HGB 14.3 12/08/2022    HCT 45.7 12/08/2022    MCV 92 12/08/2022     12/08/2022       Chemistry        Component Value Date/Time     12/08/2022 1217    K 4.2 12/08/2022 1217     12/08/2022 1217    CO2 30 (H) 12/08/2022 1217    BUN 14 12/08/2022 1217    CREATININE 1.3 12/08/2022 1217     (H) 12/08/2022 1217        Component Value Date/Time    CALCIUM 9.5 12/08/2022 1217    ALKPHOS 60 12/08/2022 1217    AST 26 12/08/2022 1217    ALT 20 12/08/2022 1217    BILITOT 0.6 12/08/2022 1217    ESTGFRAFRICA 54.5 (A) 05/02/2022 0730    EGFRNONAA 47.2 (A) 05/02/2022 0730        Lab Results   Component Value Date    HGBA1C 6.9 (H) 11/07/2022     Past Medical History:   Diagnosis Date    Anticoagulant long-term use     Diabetes mellitus     Onset late 50s/early 60s    Hyperlipidemia     Hypertension     Onset late 50s/early 60s    Kidney stones     Sleep apnea     since 2006     Past Surgical History:   Procedure Laterality Date    CORONARY ANGIOGRAPHY N/A 9/30/2020    Procedure: ANGIOGRAM, CORONARY ARTERY;  Surgeon: John West MD;  Location: [facility] CATH LAB;  Service: Cardiology;  Laterality: N/A;    CYSTOSCOPY N/A 2/17/2022    Procedure: CYSTOSCOPY;  Surgeon: Lukasz Hughes MD;  Location: [address];  Service: Urology;  Laterality: N/A;    CYSTOSCOPY W/ URETERAL STENT PLACEMENT N/A 2/25/2022    Procedure: CYSTOSCOPY, WITH URETERAL STENT INSERTION;  Surgeon: Lukasz Hughes MD;  Location: [facility] OR [address] [place] [address];  Service: Urology;  Laterality: N/A;    ENDOSCOPIC ULTRASOUND OF UPPER GASTROINTESTINAL TRACT N/A 12/7/2022    Procedure: ULTRASOUND, UPPER GI TRACT, ENDOSCOPIC;  Surgeon: Darian Main MD;  [facility] ENDO;  Service: Endoscopy;  Laterality: N/A;  Approval to hold Xarelto rec'd from Dr. Nick (see t/e 12/5/22)-DS    ESOPHAGOGASTRODUODENOSCOPY N/A 11/17/2022    Procedure: EGD (ESOPHAGOGASTRODUODENOSCOPY);  Surgeon: Brody RENDON  MD Christian;  Location: [facility] (2ND FLR);  Service: Endoscopy;  Laterality: N/A;  inst via email-ok to hold Xarelto x 2 days-MS    LASER LITHOTRIPSY Left 2/25/2022    Procedure: LITHOTRIPSY, USING LASER;  Surgeon: Lukasz Hughes MD;  [facility];  Service: Urology;  Laterality: Left;    LEFT HEART CATHETERIZATION Left 9/30/2020    Procedure: Left heart cath;  Surgeon: John West MD;  Location: [facility] C[facility] LAB;  Service: Cardiology;  Laterality: Left;    LITHOTRIPSY      PARATHYROIDECTOMY  1/1/2-107    PYELOSCOPY Left 2/25/2022    Procedure: PYELOSCOPY;  Surgeon: Lukasz Hughes MD;  [facility];  Service: Urology;  Laterality: Left;    TRANSESOPHAGEAL ECHOCARDIOGRAPHY N/A 4/7/2022    Procedure: ECHOCARDIOGRAM, TRANSESOPHAGEAL;  Surgeon: [facility] Diagnostic Provider;  Location: [facility] EP LAB;  Service: Cardiology;  Laterality: N/A;    TREATMENT OF CARDIAC ARRHYTHMIA N/A 4/7/2022    Procedure: Cardioversion or Defibrillation;  Surgeon: Iris Fisher NP;  Location: [facility] EP LAB;  Service: Cardiology;  Laterality: N/A;  afib, dccv, artie, anes, EH, 3prep    URETEROSCOPIC REMOVAL OF URETERIC CALCULUS Left 2/25/2022    Procedure: REMOVAL, CALCULUS, URETER, URETEROSCOPIC;  Surgeon: Lukasz Hughes MD;  [facility];  Service: Urology;  Laterality: Left;    URETEROSCOPY Left 2/25/2022    Procedure: URETEROSCOPY;  Surgeon: Lukasz Hughes MD;  Location: [address];  Service: Urology;  Laterality: Left;     Current Outpatient Medications on File Prior to Visit   Medication Sig Dispense Refill    ACCU-CHEK SOFTCLIX LANCETS Misc USE EVERY  each 6    allopurinoL (ZYLOPRIM) 100 MG tablet TAKE 1 TABLET BY MOUTH EVERY DAY 30 tablet 10    blood sugar diagnostic (ACCU-CHEK ANTONELLA PLUS TEST STRP) Strp Inject 1 strip into the skin 2 (two) times daily before meals. 100 strip 11    blood-glucose meter kit Checks blood sugars 1x/daily. 1 each 12    glimepiride (AMARYL) 2  MG tablet TAKE 2 TABLETS BY MOUTH EVERY MORNING 180 tablet 2    lisinopriL-hydrochlorothiazide (PRINZIDE,ZESTORETIC) 20-25 mg Tab TAKE 1 TABLET BY MOUTH EVERY DAY 90 tablet 3    metFORMIN (GLUCOPHAGE) 500 MG tablet TAKE 2 TABLETS BY MOUTH EVERY MORNING AND 2 TABLETS WITH DINNER 120 tablet 10    metoprolol succinate (TOPROL-XL) 25 MG 24 hr tablet Take 1 tablet (25 mg total) by mouth once daily. 30 tablet 11    nitroGLYCERIN (NITROSTAT) 0.4 MG SL tablet Place 1 tablet (0.4 mg total) under the tongue every 5 (five) minutes as needed for Chest pain. If you need a third tablet, call 911 60 tablet 12    omeprazole (PRILOSEC) 40 MG capsule Take 1 capsule (40 mg total) by mouth once daily. 90 capsule 3    potassium citrate (UROCIT-K) 10 mEq (1,080 mg) TbSR Take 1 tablet (10 mEq total) by mouth 3 (three) times daily with meals. 90 tablet 9    rivaroxaban (XARELTO) 20 mg Tab Take 1 tablet (20 mg total) by mouth daily with dinner or evening meal. 30 tablet 11    rosuvastatin (CRESTOR) 20 MG tablet TAKE 1 TABLET(20 MG) BY MOUTH EVERY DAY 30 tablet 11    tamsulosin (FLOMAX) 0.4 mg Cap Take 1 capsule (0.4 mg total) by mouth once daily. 30 capsule 11    testosterone (ANDROGEL) 20.25 mg/1.25 gram (1.62 %) GlPm Apply 4 pumps to shoulders daily 2 each 5     No current facility-administered medications on file prior to visit.     Review of patient's allergies indicates:  No Known Allergies    Family History:  His family history includes Alcohol abuse in his paternal aunt; Arthritis in his brother, father, mother, sister, and sister; Cancer in his maternal grandfather and paternal grandfather; Colon cancer in his paternal grandfather; Colon cancer (age of onset: 32) in his brother; Colon polyps in his paternal grandfather; Diabetes in his father and paternal grandmother; Heart disease (age of onset: 70) in his father; Hypertension in his sister; Kidney disease in his father.     Social History:   reports that he quit smoking about 27  years ago. His smoking use included cigarettes and cigars. He started smoking about 50 years ago. He has a 7.00 pack-year smoking history. He has never been exposed to tobacco smoke. He has never used smokeless tobacco. He reports current alcohol use of about 3.0 standard drinks per week. He reports that he does not currently use drugs after having used the following drugs: Marijuana.     ROS:     Review of Systems   Constitutional:  Negative for activity change, appetite change, fatigue, fever and unexpected weight change.   HENT:  Positive for trouble swallowing.    Respiratory:  Negative for cough, choking, chest tightness, shortness of breath and wheezing.    Cardiovascular:  Negative for chest pain, palpitations and leg swelling.   Gastrointestinal:  Negative for abdominal distention, abdominal pain, constipation, diarrhea, nausea and vomiting.   Musculoskeletal:  Negative for back pain, gait problem, myalgias and neck pain.   Skin:  Negative for color change and rash.   Neurological:  Negative for weakness, light-headedness, numbness and headaches.   Pertinent positive/negatives detailed in HPI, all other systems negative.     Physical Exam:  BP (!) 145/65   Pulse 63   Temp 98.7 °F (37.1 °C)   Ht 6' (1.829 m)   Wt 118.6 kg (261 lb 7.5 oz)   SpO2 96%   BMI 35.46 kg/m²     Constitutional:  Non-toxic, no acute distress.    Eyes:  Sclerae anicteric, gaze symmetrical  Resp:  Even and unlabored resp  Abd:  Soft, non-tender, no masses, no ascites, no superficial varices  Musculoskeletal:  Ambulatory, normal gait, no muscle wasting  Neuro:  No gross deficits  Psych:  Awake, alert, oriented.  Answers and asks questions appropriately      ICD-10-CM ICD-9-CM    1. Esophageal adenocarcinoma  C15.9 150.9       2. Dysphagia, unspecified type  R13.10 787.20       3. Type 2 diabetes mellitus with stage 3 chronic kidney disease, without long-term current use of insulin, unspecified whether stage 3a or 3b CKD  E11.22  250.40     N18.30 585.3       4. Paroxysmal atrial fibrillation  I48.0 427.31           Plan:  Mr. Person is a 67 y.o. male who presents with newly diagnosed esophageal adenocarcinoma of the GE junction. He reports that beginning in late July 2022 he noticed trouble swallowing certain foods- 8/2022 saw his PCP as this problem persisted. EGD noted a nodular mucosal change at  the GE junction. Biopsy taken revealed adenocarcinoma. Subsequent EUS noted a partially obstructing tumor in the lower third of the esophagus that was staged T3N0Mx by endosonographic criteria. He has seen Dr. Medrano prior to his visit and has expressed interest in a clinic trail regarding immunotherapy in conjunction with CROSS regimen. We discussed that treatment for esophageal cancer for curative intent includes chemo, radiation, and surgery. We discussed the esophagectomy procedure and that he will need to tolerate chemo and radiation then RTC for repeat imaging before scheduling surgery. We discussed the importance of nutrition and remaining active. Will get a CT chest abd pelvis with contrast to further evaluate tumor. The patient indicates understanding of these issues and agrees with the plan.  Will see him back after completion of therapy.    Questions were asked and answered to patient's satisfaction.  We discussed the need for continued clinical/radiographic/endoscopic follow-up.    Follow up in about 6 weeks (around 1/23/2023), or if symptoms worsen or fail to improve, for imaging after finished chemoradiation therapy.    This patient was seen in conjunction with Dr. Brown.        Gerri Zhang NP  Upper GI / Hepatobiliary Surgical Oncology  [facility] [place], [place]  Office: [phone]  Fax: [phone]

## 2022-12-12 NOTE — PROGRESS NOTES
: URIEL Manriquez MD  Treating Investigators: NIRAV Medrano MD  Consulting Surgical Oncologist: Gerri Zhang NP  Radiation Oncologist: Javier Moulton M.D.     Protocol: ECOG-ACRIN [id number]  IRB#: [id number]    A Phase II/III Study of Dilcia-operative Nivolumab and Ipilimumab in Patients with Locoregional Esophageal and Gastroesophageal Junction Adenocarcinoma       Informed Consent Note: [id number]  Patient presented to clinic today accompanied by his wife Amanda for his consultation visit with Dr. Medrano & Gerri Zhang NP in the surgical oncology department. Patient appears preliminarily eligible for Step 0 of trial, and Dr. Medrano discussed the study with the patient & his wife, whom was agreeable to meet with CRC for further discussion.     Dr. Medrano discussed the following aspects of the consent in detail:  -- why the study is being conducted  -- why we are asking him for his participation  -- what all the medications being used in this study are  -- the patient's treatment options should he choose not to participate in the study  -- the side effects of all medications being used in the study   -- the potential risks and potential benefits of participating  -- the purpose of the trial, evaluating the efficacy of the addition of immunotherapy to neoadjuvant chemotherapy followed by adjuvant immunotherapy  -- the radiation & chemotherapy treatment schedule   -- potential side effects of radiation therapy      CRC discussed the following in detail.  -- the different phases of clinical trials, and what it means for this study being in Phase 2  -- the study schema & the possible treatment arms he may be randomly assigned to (on a 2:1 basis)  -- screening procedures, the study procedures & what to expect at each visit  -- the radiation & chemotherapy treatment schedule   -- frequency of imaging & frequency of physician follow-up visits to monitor patient's disease  -- patient's responsibilities &  the costs of participation (what tests, procedures & medications will be billed to patient's insurance, and what is being covered by the study)  -- that patient should utilize a combination of barrier & hormonal contraception for the duration of the study and for 7 months after last dose of chemotherapy, and to tell Dr. Medrano or MACARIO if he has potentially fathered a child ASAP.  -- patient's rights as a clinical research participant & that patient may withdraw consent at any time, for any reason  -- the contact information for Dr. Medrano, Dr. Marniquez, the [facility] Institutional Review Board & where to find more information on the study  -- that participation in the study is completely voluntary. That he can withdraw his consent at any time without penalty. That Neva David or Kenney can also withdraw his consent if they no longer thinks the study is medically beneficial.    -- that we keep all information we share with study personnel for 10 years after the study closes. That he may access this information at any point during this 10 years.   -- who is authorized to view patient's PHI (study personnel, [facility] & their representatives, [facility] IR[facility], the FDA), our confidentiality clauses, and HIPAA explanation  -- all optional sub-studies were presented to the subject in detail. The patient has not given his consent to participate in the DWI-MRI study, but has consented to biobanking of blood, tissue and stool. Patient was informed that he may also withdraw his consent for any or all optional studies at any time, for any reason.     After all of the above was discussed in detail, patient was given the opportunity to ask questions. Patient was offered to take the consent forms home for further consideration, but declined and stated he wants to sign the consent today. All patient's questions re: difference between the treatment assignments, the remaining screening procedures we must complete, the frequency of visits &  duration of treatment were answered by Dr. Medrano & CRC to his satisfaction.  Patient & CRC co-signed the main consent & research HIPAA authorization. CRC provided a copy of the signed consents to patient along with my card. No non-SOC, study-specific procedures were not completed today.        Patient had SOC PE, VS w/ weight, ECOG (ECOG = 0, per Dr. Medrano). Patient will need to complete CBC, CMP, Mg, Phos, TSH, ACTH & cortisol prior to randomization. Patient was encouraged to call or email CRC or call Dr. Medrano's team with any questions or concerns. Patient verbalized his understanding & denies any questions or concerns at this time.

## 2022-12-12 NOTE — PROGRESS NOTES
MEDICAL ONCOLOGY - NEW PATIENT VISIT    Reason for visit: esophageal adenocarcinoma    Best Contact Phone Number(s): There are no phone numbers on file.     Cancer/Stage/TNM:    Cancer Staging   No matching staging information was found for the patient.     Oncology History    No history exists.        HPI:   67 y.o. male with HTN, HLD, DM, KUMAR who presents for evaluation and management of recently diagnosed esophageal adenocarcinoma. His initial symptoms presented while he was on vacation to [place] in 2022. He reports food felt like it was getting stuck mid-chest. The sensation was only relieved with vomiting. In August, he saw his PCP to evaluate the intermittent symptoms he had and was referred to endoscopy. He underwent EGD in mid-November which confirmed adenocarcinoma.     Clinically, he is doing well today. His energy level is stable. His appetite is doing well, and he reports only minor 3-5 lb fluctuations in his weight. He tolerates most foods without issue as long as he takes his time and chews well. He typically tries to avoid breads, tougher cuts of meat, and fried foods. Occasionally he will have some discomfort with water/thin fluids, but he feels this is resolved relatively quickly if he does not continue drinking for a short time. He is taking omeprazole with relief of reflux symptoms, and he is no longer drinking carbonated drinks as they exacerbate his symptoms. He has chronic mild neuropathy to his feet 2/2 diabetes. Denies fever/chills, SOB, CP, palpitations, N/V, C/D, pain, blood in urine/stool.     Presents with his wife, Amanda. ECOG 0.     Family History:  PGF - colon cancer,   MGF - throat cancer/collapsed lung,   P aunt - lung cancer, +smoker,   Brother - colon cancer, diagnosed at age 32 and underwent surgery alone. Now s/p 2 recurrences. 1st occurred in his mid 50s treated with surgery/radiation and possibly chemo. 2nd occurred at 69 years old with liver and  LN mets. Currently getting chemo     Social History:     No children   Most recently worked in ministry  Originally from [place], [place]- moved months prior to Covid lock down     History has been obtained by chart review and discussion with the patient.    ROS:   Review of Systems   Constitutional:  Negative for appetite change, chills, fatigue, fever and unexpected weight change.   HENT:  Positive for trouble swallowing. Negative for mouth sores, nosebleeds and voice change.    Eyes:  Negative for visual disturbance.   Respiratory:  Negative for cough, shortness of breath and wheezing.    Cardiovascular:  Negative for chest pain, palpitations and leg swelling.   Gastrointestinal:  Negative for abdominal pain, blood in stool, constipation, diarrhea, nausea, reflux (on omeprazole) and fecal incontinence.   Genitourinary:  Negative for bladder incontinence, dysuria, frequency, hematuria and urgency.   Musculoskeletal:  Negative for arthralgias, back pain, gait problem, leg pain, myalgias and neck pain.   Integumentary:  Negative for rash and wound.   Neurological:  Negative for dizziness, facial asymmetry, weakness, light-headedness, headaches, coordination difficulties, memory loss and coordination difficulties.   Hematological:  Does not bruise/bleed easily.   Psychiatric/Behavioral:  Negative for agitation, behavioral problems, confusion and sleep disturbance. The patient is not nervous/anxious.        Past Medical History:   Past Medical History:   Diagnosis Date    Anticoagulant long-term use     Diabetes mellitus     Onset late 50s/early 60s    Hyperlipidemia     Hypertension     Onset late 50s/early 60s    Kidney stones     Sleep apnea     since 2006        Past Surgical History:   Past Surgical History:   Procedure Laterality Date    CORONARY ANGIOGRAPHY N/A 9/30/2020    Procedure: ANGIOGRAM, CORONARY ARTERY;  Surgeon: John West MD;  Location: [facility] CATH LAB;  Service: Cardiology;   Laterality: N/A;    CYSTOSCOPY N/A 2/17/2022    Procedure: CYSTOSCOPY;  Surgeon: Lukasz Hughes MD;  [facility];  Service: Urology;  Laterality: N/A;    CYSTOSCOPY W/ URETERAL STENT PLACEMENT N/A 2/25/2022    Procedure: CYSTOSCOPY, WITH URETERAL STENT INSERTION;  Surgeon: Lukasz Hughes MD;  [facility];  Service: Urology;  Laterality: N/A;    ENDOSCOPIC ULTRASOUND OF UPPER GASTROINTESTINAL TRACT N/A 12/7/2022    Procedure: ULTRASOUND, UPPER GI TRACT, ENDOSCOPIC;  Surgeon: Darian Main MD;  Location: [facility] ENDO;  Service: Endoscopy;  Laterality: N/A;  Approval to hold Xarelto rec'd from Dr. Nick (see t/e 12/5/22)-DS    ESOPHAGOGASTRODUODENOSCOPY N/A 11/17/2022    Procedure: EGD (ESOPHAGOGASTRODUODENOSCOPY);  Surgeon: Brody Gonzales MD;  Location: [facility] ENDO (2ND FLR);  Service: Endoscopy;  Laterality: N/A;  inst via email-ok to hold Xarelto x 2 days-MS    LASER LITHOTRIPSY Left 2/25/2022    Procedure: LITHOTRIPSY, USING LASER;  Surgeon: Lukasz Hughes MD;  [facility];  Service: Urology;  Laterality: Left;    LEFT HEART CATHETERIZATION Left 9/30/2020    Procedure: Left heart cath;  Surgeon: John West MD;  Location: [facility] CATH LAB;  Service: Cardiology;  Laterality: Left;    LITHOTRIPSY      PARATHYROIDECTOMY  1/1/2-107    PYELOSCOPY Left 2/25/2022    Procedure: PYELOSCOPY;  Surgeon: Lukasz Hughes MD;  [facility];  Service: Urology;  Laterality: Left;    TRANSESOPHAGEAL ECHOCARDIOGRAPHY N/A 4/7/2022    Procedure: ECHOCARDIOGRAM, TRANSESOPHAGEAL;  Surgeon: [facility] Diagnostic Provider;  Location: [facility] EP LAB;  Service: Cardiology;  Laterality: N/A;    TREATMENT OF CARDIAC ARRHYTHMIA N/A 4/7/2022    Procedure: Cardioversion or Defibrillation;  Surgeon: Iris Fisher NP;  Location: [facility] EP LAB;  Service: Cardiology;  Laterality: N/A;  afib, dccv, artie, anes, EH, 3prep    URETEROSCOPIC REMOVAL OF URETERIC CALCULUS Left  2022    Procedure: REMOVAL, CALCULUS, URETER, URETEROSCOPIC;  Surgeon: Lukasz Hughes MD;  [facility];  Service: Urology;  Laterality: Left;    URETEROSCOPY Left 2022    Procedure: URETEROSCOPY;  Surgeon: Lukasz Hughes MD;  [facility];  Service: Urology;  Laterality: Left;        Family History:   Family History   Problem Relation Age of Onset    Arthritis Mother     Heart disease Father 70        CABG    Arthritis Father     Diabetes Father     Kidney disease Father         had one kidney removed in early thirties    Arthritis Sister     Arthritis Sister     Hypertension Sister     Colon cancer Brother 32    Arthritis Brother     Alcohol abuse Paternal Aunt     Cancer Maternal Grandfather         throat cancer    Diabetes Paternal Grandmother     Colon polyps Paternal Grandfather     Colon cancer Paternal Grandfather     Cancer Paternal Grandfather         colon cancer at age 62        Social History:   Social History     Tobacco Use    Smoking status: Former     Packs/day: 1.00     Years: 7.00     Pack years: 7.00     Types: Cigarettes, Cigars     Start date: 1972     Quit date:      Years since quittin.6     Passive exposure: Never    Smokeless tobacco: Never    Tobacco comments:     smoking was off and on.  cumulative 7 years.  never more than three years in one stretch or more lj   Substance Use Topics    Alcohol use: Yes     Alcohol/week: 3.0 standard drinks     Types: 2 Cans of beer, 1 Shots of liquor per week     Comment: don't drink regularly.  6 to 7 monthly      I have reviewed and updated the patient's past medical, surgical, family and social histories.    Allergies:   Review of patient's allergies indicates:  No Known Allergies     Medications:   Current Outpatient Medications   Medication Sig Dispense Refill    ACCU-CHEK SOFTCLIX LANCETS Misc USE EVERY  each 6    allopurinoL (ZYLOPRIM) 100 MG tablet TAKE 1  TABLET BY MOUTH EVERY DAY 30 tablet 10    blood sugar diagnostic (ACCU-CHEK ANTONELLA PLUS TEST STRP) Strp Inject 1 strip into the skin 2 (two) times daily before meals. 100 strip 11    blood-glucose meter kit Checks blood sugars 1x/daily. 1 each 12    glimepiride (AMARYL) 2 MG tablet TAKE 2 TABLETS BY MOUTH EVERY MORNING 180 tablet 2    lisinopriL-hydrochlorothiazide (PRINZIDE,ZESTORETIC) 20-25 mg Tab TAKE 1 TABLET BY MOUTH EVERY DAY 90 tablet 3    metFORMIN (GLUCOPHAGE) 500 MG tablet TAKE 2 TABLETS BY MOUTH EVERY MORNING AND 2 TABLETS WITH DINNER 120 tablet 10    metoprolol succinate (TOPROL-XL) 25 MG 24 hr tablet Take 1 tablet (25 mg total) by mouth once daily. 30 tablet 11    nitroGLYCERIN (NITROSTAT) 0.4 MG SL tablet Place 1 tablet (0.4 mg total) under the tongue every 5 (five) minutes as needed for Chest pain. If you need a third tablet, call 911 60 tablet 12    omeprazole (PRILOSEC) 40 MG capsule Take 1 capsule (40 mg total) by mouth once daily. 90 capsule 3    potassium citrate (UROCIT-K) 10 mEq (1,080 mg) TbSR Take 1 tablet (10 mEq total) by mouth 3 (three) times daily with meals. 90 tablet 9    rivaroxaban (XARELTO) 20 mg Tab Take 1 tablet (20 mg total) by mouth daily with dinner or evening meal. 30 tablet 11    rosuvastatin (CRESTOR) 20 MG tablet TAKE 1 TABLET(20 MG) BY MOUTH EVERY DAY 30 tablet 11    tamsulosin (FLOMAX) 0.4 mg Cap Take 1 capsule (0.4 mg total) by mouth once daily. 30 capsule 11    testosterone (ANDROGEL) 20.25 mg/1.25 gram (1.62 %) GlPm Apply 4 pumps to shoulders daily 2 each 5     No current facility-administered medications for this visit.        Physical Exam:   BP (!) 146/65 (BP Location: Left arm, Patient Position: Sitting, BP Method: Large (Automatic))   Pulse 63   Temp 98.7 °F (37.1 °C) (Oral)   Resp 18   Ht 6' (1.829 m)   Wt 118.6 kg (261 lb 5.7 oz)   SpO2 96%   BMI 35.45 kg/m²      ECOG Performance status: 0    Physical Exam  Vitals reviewed.   Constitutional:        General: He is not in acute distress.     Appearance: Normal appearance. He is not ill-appearing, toxic-appearing or diaphoretic.   HENT:      Head: Normocephalic and atraumatic.      Right Ear: External ear normal.      Left Ear: External ear normal.   Eyes:      General: No scleral icterus.     Extraocular Movements: Extraocular movements intact.      Conjunctiva/sclera: Conjunctivae normal.      Pupils: Pupils are equal, round, and reactive to light.   Cardiovascular:      Rate and Rhythm: Normal rate and regular rhythm.      Heart sounds: Normal heart sounds. No murmur heard.  Pulmonary:      Effort: Pulmonary effort is normal. No respiratory distress.      Breath sounds: Normal breath sounds. No wheezing or rales.   Abdominal:      General: Bowel sounds are normal. There is no distension.      Palpations: Abdomen is soft.      Tenderness: There is no abdominal tenderness.   Musculoskeletal:         General: No swelling.      Cervical back: Normal range of motion.   Lymphadenopathy:      Cervical: No cervical adenopathy.   Skin:     Coloration: Skin is not jaundiced.      Findings: No bruising, erythema or rash.   Neurological:      General: No focal deficit present.      Mental Status: He is alert and oriented to person, place, and time. Mental status is at baseline.      Cranial Nerves: No cranial nerve deficit.      Motor: No weakness.      Gait: Gait normal.   Psychiatric:         Mood and Affect: Mood normal.         Behavior: Behavior normal.         Thought Content: Thought content normal.         Judgment: Judgment normal.       Labs:   No results found for this or any previous visit (from the past 48 hour(s)).     I have reviewed the pertinent labs from 22 which are notable for mild hyperglycemia and mildly decreased GFR. Otherwise, unremarkable.     Imagin2022 - EUS  Impression:             - Esophageal mucosal changes consistent with Paredes's esophagus.   - Partially obstructing,  malignant esophageal tumor was found in the lower third of the esophagus.   - Normal stomach.   - Normal examined duodenum.   - A mass was found in the lower third of the esophagus. A tissue diagnosis was obtained prior to this exam. This is of adenocarcinoma. This was staged T3 (based on invasion into) N0 Mx by endosonographic criteria.   - No specimens collected.     12/8/2022 - PET CT   Impression:  - Distal esophageal wall thickening and hypermetabolic activity in keeping with known esophageal cancer.  - No definite evidence of metastatic disease.  Subcentimeter gastrohepatic and perigastric lymph nodes without appreciable uptake.    I have personally reviewed the imaging which is notable for mass in distal esophagus without evidence of distant metastatic disease.    Path:     Final Pathologic Diagnosis  Abnormal   Gastroesophageal junction, biopsy:   - Adenocarcinoma.   - Background intestinal metaplasia with low and high-grade dysplasia.   - Focally suspicious for lymphovascular invasion.   - MMR pending, results will be issued in addendum.   - See comment.   COMMENT:  The biopsy shows adenocarcinoma with single cell infiltration of   the lamina propria highlighted on cytokeratin stain arising in a background   of intestinal metaplasia with low and high-grade dysplasia.  There is an area   suspicious for lymphovascular invasion on routine H&E sections however this   area is not confirmed on levels or immunostain for CD 34.            Assessment:       1. Esophageal adenocarcinoma    2. Immunodeficiency secondary to neoplasm    3. Dysphagia, unspecified type    4. Type 2 diabetes mellitus with stage 3 chronic kidney disease, without long-term current use of insulin, unspecified whether stage 3a or 3b CKD    5. CKD stage 3 due to type 2 diabetes mellitus    6. Hypertension associated with diabetes    7. Hyperlipidemia associated with type 2 diabetes mellitus    8. HFrEF (heart failure with reduced ejection  fraction)    9. Paroxysmal atrial fibrillation    10. Chronic anticoagulation    11. Coronary artery disease involving native coronary artery of native heart without angina pectoris        Plan:     # Esophageal adenocarcinoma   Mr. Person is a pleasant 67 year old male who presents to our clinic for management of recently diagnosed esophageal cancer. He initially presented with dysphagia, and further workup confirmed a stage II adenocarcinoma. Tumor staged T3N0Mx by endosonographic criteria, JEVON.     We had an in-depth conversation regarding his diagnosis and treatment options. We recommend perioperative chemo/radiation per the CROSS trial. Discussed chemoradiation for ~5 weeks with 5 doses of carboplatin and taxol administered. Plan to obtain restaging scans 4-5 weeks after radiation completed.     He also appears to be a candidate for a phase 2 trial surrounding perioperative immunotherapy treatment. The trial was discussed with the patient and his wife, including risks and benefits. He is agreeable to meet with [facility] after this visit to discuss further.     He is scheduled to see surgical oncology after our appointment today. We will coordinate an appointment for him to see radiation oncology as well. He is interested in obtaining a second opinion, but he would still like to initiate treatment here in the meantime to avoid further delays.     Refer to nutrition. Dysphagia stable at this time.     Will submit treatment plan to insurance for authorization. Plan to start treatment in next 2-3 weeks pending start of radiation.     RTC prior to first cycle with labs (CBC,CMP) to see provider and discuss consent.     # HTN, HLD, DM, CKD  Following with PCP Dr. Wilson and nephrologist Dr. Oconnell.   BP and glucose controlled in clinic today.   Creatinine normal on most recent labs.   Continue medical management.   Monitor on treatment.     # CAD, A fib, CHF  Following with cardiologist Dr. Nick.   Currently asymptomatic.    On Xarelto.   Continue medical management.     Follow up: 2-3 weeks with labs to start chemoRT     Patient is in agreement with the proposed treatment plan. All questions were answered to the patient's satisfaction. Pt knows to call clinic if anything is needed before the next clinic visit.    Patient discussed with and also seen by collaborating physician, Dr. Steiner.    At least 60 minutes were spent today on this encounter including face to face time with the patient, data gathering/interpretation and documentation.       Bernadette Patterson, MSN, APRN, [facility]  Hematology and Medical Oncology  Clinical Nurse Specialist to Dr. Steiner, Dr. Smith & Dr. David    Route Chart for Scheduling    Med Onc Chart Routing      Follow up with physician    Follow up with SAMUEL 2 weeks. RTC in 2-3 weeks with labs (CBC,CMP) to see Dr. Steiner (if on Monday) or SAMUEL for consent and cycle 1 chemo   Infusion scheduling note will need weekly labs, clinic visit and infusions while on chemoRT - will work with rad onc to determine start date.   Injection scheduling note    Labs CBC and CMP   Lab interval: once a week     Imaging    Pharmacy appointment    Other referrals

## 2022-12-13 ENCOUNTER — TELEPHONE (OUTPATIENT)
Dept: SURGERY | Facility: CLINIC | Age: 67
End: 2022-12-13
Payer: MEDICARE

## 2022-12-13 DIAGNOSIS — C15.9 ESOPHAGEAL ADENOCARCINOMA: Primary | ICD-10-CM

## 2022-12-13 NOTE — TELEPHONE ENCOUNTER
Placed call to pt and informed pt his CT scan is [facility] for 12/14/22 @ 1:45 pm. Informed pt he has to be at the facility 1 hour prior and NPO 4 hrs to his scan Address of facility provided.  Pt acknowledged his appt date & time

## 2022-12-14 ENCOUNTER — OFFICE VISIT (OUTPATIENT)
Dept: RADIATION ONCOLOGY | Facility: CLINIC | Age: 67
End: 2022-12-14
Payer: MEDICARE

## 2022-12-14 ENCOUNTER — CLINICAL SUPPORT (OUTPATIENT)
Dept: HEMATOLOGY/ONCOLOGY | Facility: CLINIC | Age: 67
End: 2022-12-14
Payer: MEDICARE

## 2022-12-14 ENCOUNTER — HOSPITAL ENCOUNTER (OUTPATIENT)
Dept: RADIOLOGY | Facility: HOSPITAL | Age: 67
Discharge: HOME | End: 2022-12-14
Payer: MEDICARE

## 2022-12-14 VITALS
BODY MASS INDEX: 35.63 KG/M2 | HEART RATE: 67 BPM | WEIGHT: 262.69 LBS | OXYGEN SATURATION: 97 % | DIASTOLIC BLOOD PRESSURE: 72 MMHG | TEMPERATURE: 98 F | RESPIRATION RATE: 18 BRPM | SYSTOLIC BLOOD PRESSURE: 163 MMHG

## 2022-12-14 DIAGNOSIS — C15.9 ESOPHAGEAL ADENOCARCINOMA: ICD-10-CM

## 2022-12-14 DIAGNOSIS — C15.9 ESOPHAGEAL ADENOCARCINOMA: Primary | ICD-10-CM

## 2022-12-14 DIAGNOSIS — Z71.3 NUTRITIONAL COUNSELING: ICD-10-CM

## 2022-12-14 DIAGNOSIS — R13.10 DYSPHAGIA, UNSPECIFIED TYPE: ICD-10-CM

## 2022-12-14 PROCEDURE — 97802 MEDICAL NUTRITION INDIV IN: CPT | Mod: PBBFAC | Performed by: DIETITIAN, REGISTERED

## 2022-12-14 PROCEDURE — 99205 OFFICE O/P NEW HI 60 MIN: CPT | Mod: S$PBB,,, | Performed by: STUDENT IN AN ORGANIZED HEALTH CARE EDUCATION/TRAINING PROGRAM

## 2022-12-14 PROCEDURE — 99211 OFF/OP EST MAY X REQ PHY/QHP: CPT | Mod: PBBFAC,25 | Performed by: DIETITIAN, REGISTERED

## 2022-12-14 PROCEDURE — 74177 CT ABD & PELVIS W/CONTRAST: CPT | Mod: 26,,, | Performed by: STUDENT IN AN ORGANIZED HEALTH CARE EDUCATION/TRAINING PROGRAM

## 2022-12-14 PROCEDURE — 25500020 PHARM REV CODE 255

## 2022-12-14 PROCEDURE — 71260 CT THORAX DX C+: CPT | Mod: 26,,, | Performed by: STUDENT IN AN ORGANIZED HEALTH CARE EDUCATION/TRAINING PROGRAM

## 2022-12-14 PROCEDURE — 99999 PR PBB SHADOW E&M-EST. PATIENT-LVL V: ICD-10-PCS | Mod: PBBFAC,,, | Performed by: STUDENT IN AN ORGANIZED HEALTH CARE EDUCATION/TRAINING PROGRAM

## 2022-12-14 PROCEDURE — 74177 CT CHEST ABDOMEN PELVIS WITH CONTRAST (XPD): ICD-10-PCS | Mod: 26,,, | Performed by: STUDENT IN AN ORGANIZED HEALTH CARE EDUCATION/TRAINING PROGRAM

## 2022-12-14 PROCEDURE — 99205 PR OFFICE/OUTPT VISIT, NEW, LEVL V, 60-74 MIN: ICD-10-PCS | Mod: S$PBB,,, | Performed by: STUDENT IN AN ORGANIZED HEALTH CARE EDUCATION/TRAINING PROGRAM

## 2022-12-14 PROCEDURE — 71260 CT THORAX DX C+: CPT | Mod: TC

## 2022-12-14 PROCEDURE — 71260 CT CHEST ABDOMEN PELVIS WITH CONTRAST (XPD): ICD-10-PCS | Mod: 26,,, | Performed by: STUDENT IN AN ORGANIZED HEALTH CARE EDUCATION/TRAINING PROGRAM

## 2022-12-14 PROCEDURE — 99999 PR PBB SHADOW E&M-EST. PATIENT-LVL I: ICD-10-PCS | Mod: PBBFAC,,, | Performed by: DIETITIAN, REGISTERED

## 2022-12-14 PROCEDURE — 99999 PR PBB SHADOW E&M-EST. PATIENT-LVL I: CPT | Mod: PBBFAC,,, | Performed by: DIETITIAN, REGISTERED

## 2022-12-14 PROCEDURE — 99215 OFFICE O/P EST HI 40 MIN: CPT | Mod: PBBFAC,27,25 | Performed by: STUDENT IN AN ORGANIZED HEALTH CARE EDUCATION/TRAINING PROGRAM

## 2022-12-14 PROCEDURE — 99999 PR PBB SHADOW E&M-EST. PATIENT-LVL V: CPT | Mod: PBBFAC,,, | Performed by: STUDENT IN AN ORGANIZED HEALTH CARE EDUCATION/TRAINING PROGRAM

## 2022-12-14 PROCEDURE — 74177 CT ABD & PELVIS W/CONTRAST: CPT | Mod: TC

## 2022-12-14 RX ADMIN — IOHEXOL 100 ML: 350 INJECTION, SOLUTION INTRAVENOUS at 05:12

## 2022-12-14 NOTE — PLAN OF CARE
START ON PATHWAY REGIMEN - Gastroesophageal    [id number]        Paclitaxel       Carboplatin           Additional Orders: Given with concurrent chemoradiotherapy.  GCSF   therapy with RT is a relative contraindication.    **Always confirm dose/schedule in your pharmacy ordering system**    Patient Characteristics:  Esophageal & GE Junction, Adenocarcinoma, Preoperative or Nonsurgical Candidate   (Clinical Staging), cT2 or Higher or cN+, Surgical Candidate (Up to cT4a) -   Preoperative Therapy, Esophageal  Histology: Adenocarcinoma  Disease Classification: Esophageal  Therapeutic Status: Preoperative or Nonsurgical Candidate (Clinical Staging)  AJCC Grade: GX  AJCC 8 Stage Grouping: III  AJCC T Category: cT3  AJCC N Category: cN0  AJCC M Category: cM0  Intent of Therapy:  Curative Intent, Discussed with Patient

## 2022-12-14 NOTE — PROGRESS NOTES
Oncology Nutrition Assessment for Medical Nutrition Therapy  Initial Visit    Lukasz Person   1955    Referring Provider: Bernadette Patterson CNS      Reason for Visit: nutrition counseling and education    PMHx:   Past Medical History:   Diagnosis Date    Anticoagulant long-term use     Diabetes mellitus     Onset late 50s/early 60s    Hyperlipidemia     Hypertension     Onset late 50s/early 60s    Kidney stones     Sleep apnea     since 2006       Nutrition Assessment    This is a 67 y.o.male with a medical diagnosis of esophageal adenocarcinoma, plan for neoadjuvant chemoradiation. He reports that his appetite is good but has trouble making time to eat lunch. He does well with breakfast and dinner. Noted he has DM2 and history of kidney stones. He tries to follow diet restrictions for both of these. He reports some dysphagia with tough meats, fried foods, and some bread. He does well with soft foods and soup. He also cannot tolerate carbonated drinks. He was told by nephrology to drink 70oz fluid/day. He does fairly well with this.    He does reports some episodes of hypoglycemia after taking 2 Glimepiride (which he thinks is due to skipping lunch). He recently switched to 1 Glimipiride and hasn't had any episodes of hypoglycemia but has noticed his BG levels are a little higher (130-140s as opposed to 100-110s).    Weight: 119.16 kg (262 lb 11.2 oz)  Height: 6' (182.9 cm)  BMI: 35.6    Usual BW: 260-265lb  Weight Change: stable    Allergies: Patient has no known allergies.    Current Medications:  Current Outpatient Medications:     ACCU-CHEK SOFTCLIX LANCETS Misc, USE EVERY DAY, Disp: 100 each, Rfl: 6    allopurinoL (ZYLOPRIM) 100 MG tablet, TAKE 1 TABLET BY MOUTH EVERY DAY, Disp: 30 tablet, Rfl: 10    blood sugar diagnostic (ACCU-CHEK ANTONELLA PLUS TEST STRP) Strp, Inject 1 strip into the skin 2 (two) times daily before meals., Disp: 100 strip, Rfl: 11    blood-glucose meter kit, Checks blood sugars 1x/daily.,  Disp: 1 each, Rfl: 12    glimepiride (AMARYL) 2 MG tablet, TAKE 2 TABLETS BY MOUTH EVERY MORNING, Disp: 180 tablet, Rfl: 2    lisinopriL-hydrochlorothiazide (PRINZIDE,ZESTORETIC) 20-25 mg Tab, TAKE 1 TABLET BY MOUTH EVERY DAY, Disp: 90 tablet, Rfl: 3    metFORMIN (GLUCOPHAGE) 500 MG tablet, TAKE 2 TABLETS BY MOUTH EVERY MORNING AND 2 TABLETS WITH DINNER, Disp: 120 tablet, Rfl: 10    metoprolol succinate (TOPROL-XL) 25 MG 24 hr tablet, Take 1 tablet (25 mg total) by mouth once daily., Disp: 30 tablet, Rfl: 11    nitroGLYCERIN (NITROSTAT) 0.4 MG SL tablet, Place 1 tablet (0.4 mg total) under the tongue every 5 (five) minutes as needed for Chest pain. If you need a third tablet, call 911 (Patient not taking: Reported on 12/14/2022), Disp: 60 tablet, Rfl: 12    omeprazole (PRILOSEC) 40 MG capsule, Take 1 capsule (40 mg total) by mouth once daily., Disp: 90 capsule, Rfl: 3    potassium citrate (UROCIT-K) 10 mEq (1,080 mg) TbSR, Take 1 tablet (10 mEq total) by mouth 3 (three) times daily with meals., Disp: 90 tablet, Rfl: 9    rivaroxaban (XARELTO) 20 mg Tab, Take 1 tablet (20 mg total) by mouth daily with dinner or evening meal., Disp: 30 tablet, Rfl: 11    rosuvastatin (CRESTOR) 20 MG tablet, TAKE 1 TABLET(20 MG) BY MOUTH EVERY DAY, Disp: 30 tablet, Rfl: 11    tamsulosin (FLOMAX) 0.4 mg Cap, Take 1 capsule (0.4 mg total) by mouth once daily., Disp: 30 capsule, Rfl: 11    testosterone (ANDROGEL) 20.25 mg/1.25 gram (1.62 %) GlPm, Apply 4 pumps to shoulders daily, Disp: 2 each, Rfl: 5    Food/medication interactions noted: none    Vitamins/Supplements: none    Labs: Reviewed    Nutrition Diagnosis    Problem: nutrition-related knowledge deficit  Etiology (related to): lack of prior need for nutrition education  Signs/Symptoms (as evidenced by): new esophageal adenocarcinoma diagnosis    Nutrition Intervention    Nutrition Prescription   2383 kcals (20kcal/kg)  95g protein (0.8g/kg)   2383mL fluid  (20mL/kg)    Recommendations:  Continue to eat at least 2 meals/day  Add 2 high protein snacks midday - discussed Glucerna with peanut butter crackers or chicken salad  Discussed supplementing with Glucerna 30g protein drinks  Continue to drink at least 64oz fluid/day    Materials Provided/Reviewed:  Glucerna xavier    Nutrition Monitoring and Evaluation    Monitor: diet education needs, energy intake, and weight status    Goals: weight maintenance    Follow up: weekly in Radiation Therapy    Communication to referring provider/care team: note available in chart    Counseling time: 20 minutes    Carmen Clark MS, RD, LDN  [phone]

## 2022-12-14 NOTE — PROGRESS NOTES
Radiation Oncology Consult Note        Date of Service: 12/14/2022     Chief Complaint: adenocarcinoma of the distal 1/3 esophagus/GEJ     Reason for visit: consideration for radiation to the abdomen and evaluation for BQ1609 clinical trial     Referring Physician: Dr Medrano ([facility])     Implantable devices: denies     Therapy to Date:  No radiation     Diagnosis/Assessment:   Lukasz Person is a 67 y.o. man with Stage III (cT3, cN0, cM0) distal 1/3 esophageal/ GEJ Siewert I adenocarcinoma, MMR intact.    PMH left kidney stones, CAD s/p stents on plavix, paroxysmal afib (cardioversion  4/2022), KUMAR on CPAP, HTN, HLD, T2DM, CKD3, hyperparathyroidism s/p parathyroidectomy      ECOG 1     Plan   We discussed treatment options per NCCN guidelines v2022  - preoperative chemoradiation followed by surgery +/- adjuvant nivolumab (residual yp disease)  - definitive chemoradiation     Also  - consideration for clinical trial [id number] which appears to be re-opened (uses neoadjuvant carbo/taxol +/- nivo and adjuvant nivo +/- ipi )  https://clinicaltrials.gov/ct2/show/record/KLM92299569?view=record       Best outcome is with therapy involving surgery using preop CRT.   EBRT would consist of daily radiation treatments M-F, to the esophagus and involved regional lymph nodes to a dose of 41.1-50.4 Gy in 23-28 fractions at 1.8 Gy per fraction delivered daily, using IMRT.  Risks, benefits, and side effects of radiotherapy were discussed.   Side effects include but are not limited to fatigue, dysphagia, and pneumonitis.        The patient signed RT informed consent after I answered questions to their apparent satisfaction.    - RT consent signed today  - CT sim 12/16/2022 NPO x  4 hours  - meeting nutrition tomorrow    HPI:   Lukasz Person is a 67 y.o. man with recent diagnosis of esophageal cancer after presenting with dysphagia in July 2022     Oncologic history:  11/17/2022 upper EGD (Dr Gonzales)  Friable (with contact bleeding),  nodular mucosa in the esophagus at the GE junction  Path Adenocarcinoma, MMR intact    2022 upper EGD with US (Dr Main)   Paredes's esophagus in lower third of esophagus, 4cm long   medium-sized, submucosal partially obstructing circumferential mass no bleeding and no stigmata of recent bleeding in lower third of esophagus, 39 to 42 cm from the incisors.   uT3N0    2022 PET CT   distal esophageal wall thickening SUV max 6.2  tiny adjacent LN, no appreciable uptake inc a gastrohepatic LN and perigastric LN  no definite evidence of metastatic disease       2022 surgonc (Dr Brown) eval     2022 GI medon (Dr Medrano_): plan for CROSS    Subjective:   In clinic the patient is alone.  Overall he feels well.  He does have dysphagia to breads, solids, grits and crusty foods, but no issues with liquids.  He denies any blood per rectum.  He recently had 1 episode of nausea, but no vomiting. It has self resolved.    The patient denies other major complaints.     The patient denies any history of radiation therapy, implantable cardiac devices, or connective tissue disease.     Social history   since  with no children   Most recently worked in [employer]  Wife works at [employer].  Originally from [place], [place]- moved months prior to Covid lock down   Quit smoking 27 years ago, started smoking about 50 years ago, about 7 pack-year smoking history.  Current alcohol use, about 3.0 standard drinks per week     Family history  PGF - colon cancer,   MGF - throat cancer/collapsed lung,   Brother - recurrent colon cancer, diagnosed at age 32 s/p surgery alone. Now on chemo    Current Outpatient Medications on File Prior to Visit   Medication Sig Dispense Refill    allopurinoL (ZYLOPRIM) 100 MG tablet TAKE 1 TABLET BY MOUTH EVERY DAY 30 tablet 10    glimepiride (AMARYL) 2 MG tablet TAKE 2 TABLETS BY MOUTH EVERY MORNING 180 tablet 2    lisinopriL-hydrochlorothiazide (PRINZIDE,ZESTORETIC)  20-25 mg Tab TAKE 1 TABLET BY MOUTH EVERY DAY 90 tablet 3    metFORMIN (GLUCOPHAGE) 500 MG tablet TAKE 2 TABLETS BY MOUTH EVERY MORNING AND 2 TABLETS WITH DINNER 120 tablet 10    metoprolol succinate (TOPROL-XL) 25 MG 24 hr tablet Take 1 tablet (25 mg total) by mouth once daily. 30 tablet 11    omeprazole (PRILOSEC) 40 MG capsule Take 1 capsule (40 mg total) by mouth once daily. 90 capsule 3    potassium citrate (UROCIT-K) 10 mEq (1,080 mg) TbSR Take 1 tablet (10 mEq total) by mouth 3 (three) times daily with meals. 90 tablet 9    rivaroxaban (XARELTO) 20 mg Tab Take 1 tablet (20 mg total) by mouth daily with dinner or evening meal. 30 tablet 11    rosuvastatin (CRESTOR) 20 MG tablet TAKE 1 TABLET(20 MG) BY MOUTH EVERY DAY 30 tablet 11    tamsulosin (FLOMAX) 0.4 mg Cap Take 1 capsule (0.4 mg total) by mouth once daily. 30 capsule 11    testosterone (ANDROGEL) 20.25 mg/1.25 gram (1.62 %) GlPm Apply 4 pumps to shoulders daily 2 each 5    ACCU-CHEK SOFTCLIX LANCETS Misc USE EVERY  each 6    blood sugar diagnostic (ACCU-CHEK ANTONELLA PLUS TEST STRP) Strp Inject 1 strip into the skin 2 (two) times daily before meals. 100 strip 11    blood-glucose meter kit Checks blood sugars 1x/daily. 1 each 12    nitroGLYCERIN (NITROSTAT) 0.4 MG SL tablet Place 1 tablet (0.4 mg total) under the tongue every 5 (five) minutes as needed for Chest pain. If you need a third tablet, call 911 (Patient not taking: Reported on 12/14/2022) 60 tablet 12     No current facility-administered medications on file prior to visit.       Review of patient's allergies indicates:  No Known Allergies    Past Surgical History:   Procedure Laterality Date    CORONARY ANGIOGRAPHY N/A 9/30/2020    Procedure: ANGIOGRAM, CORONARY ARTERY;  Surgeon: John West MD;  Location: [facility] CATH LAB;  Service: Cardiology;  Laterality: N/A;    CYSTOSCOPY N/A 2/17/2022    Procedure: CYSTOSCOPY;  Surgeon: Lukasz Hughes MD;  [facility];   Service: Urology;  Laterality: N/A;    CYSTOSCOPY W/ URETERAL STENT PLACEMENT N/A 2/25/2022    Procedure: CYSTOSCOPY, WITH URETERAL STENT INSERTION;  Surgeon: Lukasz Hughes MD;  [facility];  Service: Urology;  Laterality: N/A;    ENDOSCOPIC ULTRASOUND OF UPPER GASTROINTESTINAL TRACT N/A 12/7/2022    Procedure: ULTRASOUND, UPPER GI TRACT, ENDOSCOPIC;  Surgeon: Darian Main MD;  Location: [facility] ENDO;  Service: Endoscopy;  Laterality: N/A;  Approval to hold Xarelto rec'd from Dr. Nick (see t/e 12/5/22)-DS    ESOPHAGOGASTRODUODENOSCOPY N/A 11/17/2022    Procedure: EGD (ESOPHAGOGASTRODUODENOSCOPY);  Surgeon: Brody Gonzales MD;  Location: [facility] (2ND FLR);  Service: Endoscopy;  Laterality: N/A;  inst via email-ok to hold Xarelto x 2 days-MS    LASER LITHOTRIPSY Left 2/25/2022    Procedure: LITHOTRIPSY, USING LASER;  Surgeon: Lukasz Hughes MD;  [facility];  Service: Urology;  Laterality: Left;    LEFT HEART CATHETERIZATION Left 9/30/2020    Procedure: Left heart cath;  Surgeon: John West MD;  Location: [facility] CATH LAB;  Service: Cardiology;  Laterality: Left;    LITHOTRIPSY      PARATHYROIDECTOMY  1/1/2-107    PYELOSCOPY Left 2/25/2022    Procedure: PYELOSCOPY;  Surgeon: Lukasz Hughes MD;  Location: [address];  Service: Urology;  Laterality: Left;    TRANSESOPHAGEAL ECHOCARDIOGRAPHY N/A 4/7/2022    Procedure: ECHOCARDIOGRAM, TRANSESOPHAGEAL;  Surgeon: Xander Diagnostic Provider;  Location: [facility] EP LAB;  Service: Cardiology;  Laterality: N/A;    TREATMENT OF CARDIAC ARRHYTHMIA N/A 4/7/2022    Procedure: Cardioversion or Defibrillation;  Surgeon: Iris Fisher NP;  Location: [facility] EP LAB;  Service: Cardiology;  Laterality: N/A;  afib, dccv, artie, anes, EH, 3prep    URETEROSCOPIC REMOVAL OF URETERIC CALCULUS Left 2/25/2022    Procedure: REMOVAL, CALCULUS, URETER, URETEROSCOPIC;  Surgeon: Lukasz Hughes MD;  Location: [facility] OR [address] [place] [address];  Service: Urology;   Laterality: Left;    URETEROSCOPY Left 2/25/2022    Procedure: URETEROSCOPY;  Surgeon: Lukasz Hughes MD;  Location: [facility] OR [address] [place] [address];  Service: Urology;  Laterality: Left;       Past Medical History:   Diagnosis Date    Anticoagulant long-term use     Diabetes mellitus     Onset late 50s/early 60s    Hyperlipidemia     Hypertension     Onset late 50s/early 60s    Kidney stones     Sleep apnea     since 2006     Review of Systems   Constitutional:  Negative for fatigue, fever and unexpected weight change.   HENT:  Positive for trouble swallowing. Negative for hearing loss, sinus pressure/congestion and tinnitus.    Eyes:  Negative for visual disturbance.   Respiratory:  Positive for cough. Negative for shortness of breath and wheezing.    Cardiovascular:  Negative for chest pain, palpitations and leg swelling.   Gastrointestinal:  Positive for diarrhea. Negative for abdominal pain, blood in stool, constipation, nausea, vomiting and reflux.   Genitourinary:  Negative for dysuria, frequency, hematuria and urgency.   Musculoskeletal:  Negative for arthralgias, back pain and neck pain.   Integumentary:  Negative for rash.   Neurological:  Positive for numbness. Negative for dizziness, seizures, speech difficulty, weakness, headaches and memory loss.   Psychiatric/Behavioral:  Negative for dysphoric mood. The patient is not nervous/anxious.          Objective:   Physical Exam  Vitals reviewed.   Constitutional:       Appearance: Normal appearance.   HENT:      Head: Normocephalic and atraumatic.   Eyes:      Conjunctiva/sclera: Conjunctivae normal.   Cardiovascular:      Comments: extremities well perfused  Pulmonary:      Effort: Pulmonary effort is normal.   Abdominal:      General: There is no distension.   Musculoskeletal:         General: Normal range of motion.      Cervical back: Normal range of motion.   Neurological:      General: No focal deficit present.      Mental Status: Alert and oriented  Psychiatric:          Mood and Affect: Mood normal.         Behavior: Behavior normal.      Imaging: I have personally reviewed the patient's available images and reports and summarized pertinent findings above in HPI.      Pathology: I have personally reviewed the patient's available pathology and summarized pertinent findings above in HPI.         I spent approximately 60 minutes reviewing the available records and evaluating the patient, out of which over 50% of the time was spent face to face with the patient in counseling and coordinating this patient's care.     Thank you for the opportunity to care for this patient. Please do not hesitate to contact me with any questions.     Javier Moulton MD/PhD

## 2022-12-15 DIAGNOSIS — Z00.6 CLINICAL TRIAL PARTICIPANT: ICD-10-CM

## 2022-12-15 DIAGNOSIS — C15.9 ESOPHAGEAL ADENOCARCINOMA: Primary | ICD-10-CM

## 2022-12-15 NOTE — PROGRESS NOTES
: URIEL Manriquez MD  Treating Investigators: NIRAV Medrano MD  Consulting Surgical Oncologist: Gerri Zhang NP  Radiation Oncologist: Javier Moulton M.D.      Protocol: ECOG-ACRIN [id number]  IRB#: [id number]       A Phase II/III Study of Dilcia-operative Nivolumab and Ipilimumab in Patients with Locoregional Esophageal and Gastroesophageal Junction Adenocarcinoma    Telephone Note: S/W patient 12/15/2022 @ 1213 re: upcoming appointments. Confirmed eligibility labs on 12/16 prior to his CT Simulation appointment, then starting treatment on 12/20 @ 0800 with his pre-dose labs & MD appointment on 12/19 @ 1400. RT would start on either 12/19 or 12/20.   Confirmed all appointments with patient & he is agreeable with plan.     Also discussed port-placement

## 2022-12-16 ENCOUNTER — PATIENT MESSAGE (OUTPATIENT)
Dept: HEMATOLOGY/ONCOLOGY | Facility: CLINIC | Age: 67
End: 2022-12-16
Payer: MEDICARE

## 2022-12-16 ENCOUNTER — TELEPHONE (OUTPATIENT)
Dept: HEMATOLOGY/ONCOLOGY | Facility: CLINIC | Age: 67
End: 2022-12-16
Payer: MEDICARE

## 2022-12-16 ENCOUNTER — HOSPITAL ENCOUNTER (OUTPATIENT)
Dept: RADIATION THERAPY | Facility: HOSPITAL | Age: 67
Discharge: HOME | End: 2022-12-16
Attending: STUDENT IN AN ORGANIZED HEALTH CARE EDUCATION/TRAINING PROGRAM
Payer: MEDICARE

## 2022-12-16 PROCEDURE — 77334 PR  RADN TREATMENT AID(S) COMPLX: ICD-10-PCS | Mod: 26,,, | Performed by: STUDENT IN AN ORGANIZED HEALTH CARE EDUCATION/TRAINING PROGRAM

## 2022-12-16 PROCEDURE — 77263 THER RADIOLOGY TX PLNG CPLX: CPT | Mod: ,,, | Performed by: STUDENT IN AN ORGANIZED HEALTH CARE EDUCATION/TRAINING PROGRAM

## 2022-12-16 PROCEDURE — 77014 PR  CT GUIDANCE PLACEMENT RAD THERAPY FIELDS: CPT | Mod: 26,,, | Performed by: STUDENT IN AN ORGANIZED HEALTH CARE EDUCATION/TRAINING PROGRAM

## 2022-12-16 PROCEDURE — 77014 PR  CT GUIDANCE PLACEMENT RAD THERAPY FIELDS: ICD-10-PCS | Mod: 26,,, | Performed by: STUDENT IN AN ORGANIZED HEALTH CARE EDUCATION/TRAINING PROGRAM

## 2022-12-16 PROCEDURE — 77263 PR  RADIATION THERAPY PLAN COMPLEX: ICD-10-PCS | Mod: ,,, | Performed by: STUDENT IN AN ORGANIZED HEALTH CARE EDUCATION/TRAINING PROGRAM

## 2022-12-16 PROCEDURE — 77334 RADIATION TREATMENT AID(S): CPT | Mod: TC | Performed by: STUDENT IN AN ORGANIZED HEALTH CARE EDUCATION/TRAINING PROGRAM

## 2022-12-16 PROCEDURE — 77334 RADIATION TREATMENT AID(S): CPT | Mod: 26,,, | Performed by: STUDENT IN AN ORGANIZED HEALTH CARE EDUCATION/TRAINING PROGRAM

## 2022-12-16 PROCEDURE — 77014 HC CT GUIDANCE RADIATION THERAPY FLDS PLACEMENT: CPT | Mod: TC | Performed by: STUDENT IN AN ORGANIZED HEALTH CARE EDUCATION/TRAINING PROGRAM

## 2022-12-19 ENCOUNTER — PATIENT MESSAGE (OUTPATIENT)
Dept: ADMINISTRATIVE | Facility: OTHER | Age: 67
End: 2022-12-19
Payer: MEDICARE

## 2022-12-19 ENCOUNTER — OFFICE VISIT (OUTPATIENT)
Dept: HEMATOLOGY/ONCOLOGY | Facility: CLINIC | Age: 67
End: 2022-12-19
Payer: MEDICARE

## 2022-12-19 VITALS
RESPIRATION RATE: 18 BRPM | TEMPERATURE: 99 F | DIASTOLIC BLOOD PRESSURE: 63 MMHG | HEART RATE: 63 BPM | HEIGHT: 72 IN | BODY MASS INDEX: 35.29 KG/M2 | SYSTOLIC BLOOD PRESSURE: 132 MMHG | WEIGHT: 260.56 LBS | OXYGEN SATURATION: 97 %

## 2022-12-19 DIAGNOSIS — C15.9 ESOPHAGEAL ADENOCARCINOMA: Primary | ICD-10-CM

## 2022-12-19 DIAGNOSIS — E11.22 TYPE 2 DIABETES MELLITUS WITH STAGE 3 CHRONIC KIDNEY DISEASE, WITHOUT LONG-TERM CURRENT USE OF INSULIN, UNSPECIFIED WHETHER STAGE 3A OR 3B CKD: ICD-10-CM

## 2022-12-19 DIAGNOSIS — N18.30 CKD STAGE 3 DUE TO TYPE 2 DIABETES MELLITUS: ICD-10-CM

## 2022-12-19 DIAGNOSIS — R13.10 DYSPHAGIA, UNSPECIFIED TYPE: ICD-10-CM

## 2022-12-19 DIAGNOSIS — I48.0 PAROXYSMAL ATRIAL FIBRILLATION: ICD-10-CM

## 2022-12-19 DIAGNOSIS — Z79.01 CHRONIC ANTICOAGULATION: ICD-10-CM

## 2022-12-19 DIAGNOSIS — E11.69 HYPERLIPIDEMIA ASSOCIATED WITH TYPE 2 DIABETES MELLITUS: ICD-10-CM

## 2022-12-19 DIAGNOSIS — I25.10 CORONARY ARTERY DISEASE INVOLVING NATIVE CORONARY ARTERY OF NATIVE HEART WITHOUT ANGINA PECTORIS: ICD-10-CM

## 2022-12-19 DIAGNOSIS — I15.2 HYPERTENSION ASSOCIATED WITH DIABETES: ICD-10-CM

## 2022-12-19 DIAGNOSIS — E78.5 HYPERLIPIDEMIA ASSOCIATED WITH TYPE 2 DIABETES MELLITUS: ICD-10-CM

## 2022-12-19 DIAGNOSIS — D84.81 IMMUNODEFICIENCY SECONDARY TO NEOPLASM: ICD-10-CM

## 2022-12-19 DIAGNOSIS — E11.22 CKD STAGE 3 DUE TO TYPE 2 DIABETES MELLITUS: ICD-10-CM

## 2022-12-19 DIAGNOSIS — I70.0 AORTIC ATHEROSCLEROSIS: ICD-10-CM

## 2022-12-19 DIAGNOSIS — N18.30 TYPE 2 DIABETES MELLITUS WITH STAGE 3 CHRONIC KIDNEY DISEASE, WITHOUT LONG-TERM CURRENT USE OF INSULIN, UNSPECIFIED WHETHER STAGE 3A OR 3B CKD: ICD-10-CM

## 2022-12-19 DIAGNOSIS — E11.59 HYPERTENSION ASSOCIATED WITH DIABETES: ICD-10-CM

## 2022-12-19 DIAGNOSIS — I50.20 HFREF (HEART FAILURE WITH REDUCED EJECTION FRACTION): ICD-10-CM

## 2022-12-19 DIAGNOSIS — D49.9 IMMUNODEFICIENCY SECONDARY TO NEOPLASM: ICD-10-CM

## 2022-12-19 PROCEDURE — 99214 OFFICE O/P EST MOD 30 MIN: CPT | Mod: Q1,S$PBB,, | Performed by: INTERNAL MEDICINE

## 2022-12-19 PROCEDURE — 99999 PR PBB SHADOW E&M-EST. PATIENT-LVL V: ICD-10-PCS | Mod: PBBFAC,,, | Performed by: INTERNAL MEDICINE

## 2022-12-19 PROCEDURE — 99215 OFFICE O/P EST HI 40 MIN: CPT | Mod: PBBFAC | Performed by: INTERNAL MEDICINE

## 2022-12-19 PROCEDURE — 99999 PR PBB SHADOW E&M-EST. PATIENT-LVL V: CPT | Mod: PBBFAC,,, | Performed by: INTERNAL MEDICINE

## 2022-12-19 PROCEDURE — 99214 PR OFFICE/OUTPT VISIT, EST, LEVL IV, 30-39 MIN: ICD-10-PCS | Mod: Q1,S$PBB,, | Performed by: INTERNAL MEDICINE

## 2022-12-19 NOTE — PROGRESS NOTES
MEDICAL ONCOLOGY - ESTABLISHED PATIENT VISIT    Reason for visit: esophageal adenocarcinoma    Best Contact Phone Number(s): There are no phone numbers on file.     Cancer/Stage/TNM:    Cancer Staging   Esophageal adenocarcinoma  Staging form: Esophagus - Adenocarcinoma, AJCC 8th Edition  - Clinical stage from 11/17/2022: Stage III (cT3, cN0, cM0) - Signed by Javier Moulton MD on 12/14/2022       Oncology History   Esophageal adenocarcinoma   11/17/2022 Cancer Staged    Staging form: Esophagus - Adenocarcinoma, AJCC 8th Edition  - Clinical stage from 11/17/2022: Stage III (cT3, cN0, cM0)       12/8/2022 Initial Diagnosis    Esophageal adenocarcinoma     12/21/2022 -  Chemotherapy    Treatment Summary   Plan Name: OP ESOPHAGEAL PACLITAXEL CARBOPLATIN WEEKLY  Treatment Goal: Curative  Status: Active  Start Date: 12/21/2022 (Planned)  End Date: 1/18/2023 (Planned)  Provider: Miki Medrano MD  Chemotherapy: CARBOplatin (PARAPLATIN) in sodium chloride 0.9% 250 mL chemo infusion, , Intravenous, Clinic/HOD 1 time, 0 of 1 cycle  PACLitaxeL (TAXOL) 50 mg/m2 = 120 mg in sodium chloride 0.9% 250 mL chemo infusion, 50 mg/m2, Intravenous, Clinic/HOD 1 time, 0 of 1 cycle            Interim History:   67 y.o. male with esophageal adenocarcinoma who presents for follow-up.  He is set to begin chemoradiation next week.  He has stable dysphagia without any worsening and without pain.  Weight stable.  Takes omeprazole with control of his reflux.  Has several questions regarding logistics about his plan.  Requests port placement.    Presents alone. ECOG 0.     ROS:   Review of Systems   Constitutional:  Negative for appetite change, chills, fatigue, fever and unexpected weight change.   HENT:  Positive for trouble swallowing. Negative for mouth sores, nosebleeds and voice change.    Eyes:  Negative for visual disturbance.   Respiratory:  Negative for cough, shortness of breath and wheezing.    Cardiovascular:  Negative for  chest pain, palpitations and leg swelling.   Gastrointestinal:  Negative for abdominal pain, blood in stool, constipation, diarrhea, nausea, reflux (on omeprazole) and fecal incontinence.   Genitourinary:  Negative for bladder incontinence, dysuria, frequency, hematuria and urgency.   Musculoskeletal:  Negative for arthralgias, back pain, gait problem, leg pain, myalgias and neck pain.   Integumentary:  Negative for rash and wound.   Neurological:  Negative for dizziness, facial asymmetry, weakness, light-headedness, headaches, coordination difficulties, memory loss and coordination difficulties.   Hematological:  Does not bruise/bleed easily.   Psychiatric/Behavioral:  Negative for agitation, behavioral problems, confusion and sleep disturbance. The patient is not nervous/anxious.        Past Medical History:   Past Medical History:   Diagnosis Date    Anticoagulant long-term use     Diabetes mellitus     Onset late 50s/early 60s    Hyperlipidemia     Hypertension     Onset late 50s/early 60s    Kidney stones     Sleep apnea     since 2006        Past Surgical History:   Past Surgical History:   Procedure Laterality Date    CORONARY ANGIOGRAPHY N/A 9/30/2020    Procedure: ANGIOGRAM, CORONARY ARTERY;  Surgeon: John West MD;  Location: [facility] CATH LAB;  Service: Cardiology;  Laterality: N/A;    CYSTOSCOPY N/A 2/17/2022    Procedure: CYSTOSCOPY;  Surgeon: Lukasz Hughes MD;  Location: [facility] OR [address] [place] [address];  Service: Urology;  Laterality: N/A;    CYSTOSCOPY W/ URETERAL STENT PLACEMENT N/A 2/25/2022    Procedure: CYSTOSCOPY, WITH URETERAL STENT INSERTION;  Surgeon: Lukasz Hughes MD;  Location: [address];  Service: Urology;  Laterality: N/A;    ENDOSCOPIC ULTRASOUND OF UPPER GASTROINTESTINAL TRACT N/A 12/7/2022    Procedure: ULTRASOUND, UPPER GI TRACT, ENDOSCOPIC;  Surgeon: Darian Main MD;  Location: [facility] ENDO;  Service: Endoscopy;  Laterality: N/A;  Approval to hold Drake rec'd from Dr. Nick  (see t/e 12/5/22)-DS    ESOPHAGOGASTRODUODENOSCOPY N/A 11/17/2022    Procedure: EGD (ESOPHAGOGASTRODUODENOSCOPY);  Surgeon: Brody Gonzales MD;  Location: [facility] ENDO (2ND FLR);  Service: Endoscopy;  Laterality: N/A;  inst via email-ok to hold Xarelto x 2 days-MS    LASER LITHOTRIPSY Left 2/25/2022    Procedure: LITHOTRIPSY, USING LASER;  Surgeon: Lukasz Hughes MD;  [facility];  Service: Urology;  Laterality: Left;    LEFT HEART CATHETERIZATION Left 9/30/2020    Procedure: Left heart cath;  Surgeon: John West MD;  Location: [facility] CATH LAB;  Service: Cardiology;  Laterality: Left;    LITHOTRIPSY      PARATHYROIDECTOMY  1/1/2-107    PYELOSCOPY Left 2/25/2022    Procedure: PYELOSCOPY;  Surgeon: Lukasz Hughes MD;  Location: [facility] OR [address] [place] [address];  Service: Urology;  Laterality: Left;    TRANSESOPHAGEAL ECHOCARDIOGRAPHY N/A 4/7/2022    Procedure: ECHOCARDIOGRAM, TRANSESOPHAGEAL;  Surgeon: Xander Diagnostic Provider;  Location: [facility] EP LAB;  Service: Cardiology;  Laterality: N/A;    TREATMENT OF CARDIAC ARRHYTHMIA N/A 4/7/2022    Procedure: Cardioversion or Defibrillation;  Surgeon: Iris Fisher NP;  Location: [facility] EP LAB;  Service: Cardiology;  Laterality: N/A;  afib, dccv, artie, anes, EH, 3prep    URETEROSCOPIC REMOVAL OF URETERIC CALCULUS Left 2/25/2022    Procedure: REMOVAL, CALCULUS, URETER, URETEROSCOPIC;  Surgeon: Lukasz Hughes MD;  Location: [facility] OR [address] [place] [address];  Service: Urology;  Laterality: Left;    URETEROSCOPY Left 2/25/2022    Procedure: URETEROSCOPY;  Surgeon: Lukasz Hughes MD;  Location: [address];  Service: Urology;  Laterality: Left;        Family History:   Family History   Problem Relation Age of Onset    Arthritis Mother     Heart disease Father 70        CABG    Arthritis Father     Diabetes Father     Kidney disease Father         had one kidney removed in early thirties    Arthritis Sister     Arthritis Sister     Hypertension Sister     Colon cancer Brother  32    Arthritis Brother     Alcohol abuse Paternal Aunt     Cancer Maternal Grandfather         throat cancer    Diabetes Paternal Grandmother     Colon polyps Paternal Grandfather     Colon cancer Paternal Grandfather     Cancer Paternal Grandfather         colon cancer at age 62        Social History:   Social History     Tobacco Use    Smoking status: Former     Packs/day: 1.00     Years: 7.00     Pack years: 7.00     Types: Cigarettes, Cigars     Start date: 1972     Quit date:      Years since quittin.6     Passive exposure: Never    Smokeless tobacco: Never    Tobacco comments:     smoking was off and on.  cumulative 7 years.  never more than three years in one stretch or more lj   Substance Use Topics    Alcohol use: Yes     Alcohol/week: 3.0 standard drinks     Types: 2 Cans of beer, 1 Shots of liquor per week     Comment: don't drink regularly.  6 to 7 monthly      I have reviewed and updated the patient's past medical, surgical, family and social histories.    Allergies:   Review of patient's allergies indicates:  No Known Allergies     Medications:   Current Outpatient Medications   Medication Sig Dispense Refill    ACCU-CHEK SOFTCLIX LANCETS Misc USE EVERY  each 6    allopurinoL (ZYLOPRIM) 100 MG tablet TAKE 1 TABLET BY MOUTH EVERY DAY 30 tablet 10    blood sugar diagnostic (ACCU-CHEK ANTONELLA PLUS TEST STRP) Strp Inject 1 strip into the skin 2 (two) times daily before meals. 100 strip 11    blood-glucose meter kit Checks blood sugars 1x/daily. 1 each 12    glimepiride (AMARYL) 2 MG tablet TAKE 2 TABLETS BY MOUTH EVERY MORNING 180 tablet 2    lisinopriL-hydrochlorothiazide (PRINZIDE,ZESTORETIC) 20-25 mg Tab TAKE 1 TABLET BY MOUTH EVERY DAY 90 tablet 3    metFORMIN (GLUCOPHAGE) 500 MG tablet TAKE 2 TABLETS BY MOUTH EVERY MORNING AND 2 TABLETS WITH DINNER 120 tablet 10    metoprolol succinate (TOPROL-XL) 25 MG 24 hr tablet Take 1 tablet (25 mg total) by mouth once daily. 30 tablet 11     nitroGLYCERIN (NITROSTAT) 0.4 MG SL tablet Place 1 tablet (0.4 mg total) under the tongue every 5 (five) minutes as needed for Chest pain. If you need a third tablet, call 911 (Patient not taking: Reported on 12/14/2022) 60 tablet 12    omeprazole (PRILOSEC) 40 MG capsule Take 1 capsule (40 mg total) by mouth once daily. 90 capsule 3    potassium citrate (UROCIT-K) 10 mEq (1,080 mg) TbSR Take 1 tablet (10 mEq total) by mouth 3 (three) times daily with meals. 90 tablet 9    rivaroxaban (XARELTO) 20 mg Tab Take 1 tablet (20 mg total) by mouth daily with dinner or evening meal. 30 tablet 11    rosuvastatin (CRESTOR) 20 MG tablet TAKE 1 TABLET(20 MG) BY MOUTH EVERY DAY 30 tablet 11    tamsulosin (FLOMAX) 0.4 mg Cap Take 1 capsule (0.4 mg total) by mouth once daily. 30 capsule 11    testosterone (ANDROGEL) 20.25 mg/1.25 gram (1.62 %) GlPm Apply 4 pumps to shoulders daily 2 each 5     No current facility-administered medications for this visit.        Physical Exam:   /63 (BP Location: Left arm, Patient Position: Sitting, BP Method: Large (Automatic))   Pulse 63   Temp 98.7 °F (37.1 °C) (Oral)   Resp 18   Ht 6' (1.829 m)   Wt 118.2 kg (260 lb 9.3 oz)   SpO2 97%   BMI 35.34 kg/m²      ECOG Performance status: 0    Physical Exam  Vitals reviewed.   Constitutional:       General: He is not in acute distress.     Appearance: Normal appearance. He is not ill-appearing, toxic-appearing or diaphoretic.   HENT:      Head: Normocephalic and atraumatic.      Right Ear: External ear normal.      Left Ear: External ear normal.   Eyes:      General: No scleral icterus.     Extraocular Movements: Extraocular movements intact.      Conjunctiva/sclera: Conjunctivae normal.      Pupils: Pupils are equal, round, and reactive to light.   Cardiovascular:      Rate and Rhythm: Normal rate and regular rhythm.      Heart sounds: Normal heart sounds. No murmur heard.  Pulmonary:      Effort: Pulmonary effort is normal. No  respiratory distress.      Breath sounds: Normal breath sounds. No wheezing or rales.   Abdominal:      General: Bowel sounds are normal. There is no distension.      Palpations: Abdomen is soft.      Tenderness: There is no abdominal tenderness.   Musculoskeletal:         General: No swelling.      Cervical back: Normal range of motion.   Lymphadenopathy:      Cervical: No cervical adenopathy.   Skin:     Coloration: Skin is not jaundiced.      Findings: No bruising, erythema or rash.   Neurological:      General: No focal deficit present.      Mental Status: He is alert and oriented to person, place, and time. Mental status is at baseline.      Cranial Nerves: No cranial nerve deficit.      Motor: No weakness.      Gait: Gait normal.   Psychiatric:         Mood and Affect: Mood normal.         Behavior: Behavior normal.         Thought Content: Thought content normal.         Judgment: Judgment normal.       Labs:   Recent Results (from the past 48 hour(s))   CBC W/ AUTO DIFFERENTIAL    Collection Time: 12/19/22  2:02 PM   Result Value Ref Range    WBC 8.26 3.90 - 12.70 K/uL    RBC 5.13 4.60 - 6.20 M/uL    Hemoglobin 14.9 14.0 - 18.0 g/dL    Hematocrit 46.8 40.0 - 54.0 %    MCV 91 82 - 98 fL    MCH 29.0 27.0 - 31.0 pg    MCHC 31.8 (L) 32.0 - 36.0 g/dL    RDW 14.4 11.5 - 14.5 %    Platelets 165 150 - 450 K/uL    MPV 10.8 9.2 - 12.9 fL    Immature Granulocytes 0.5 0.0 - 0.5 %    Gran # (ANC) 3.9 1.8 - 7.7 K/uL    Immature Grans (Abs) 0.04 0.00 - 0.04 K/uL    Lymph # 1.8 1.0 - 4.8 K/uL    Mono # 0.5 0.3 - 1.0 K/uL    Eos # 1.9 (H) 0.0 - 0.5 K/uL    Baso # 0.05 0.00 - 0.20 K/uL    nRBC 0 0 /100 WBC    Gran % 47.2 38.0 - 73.0 %    Lymph % 22.0 18.0 - 48.0 %    Mono % 6.3 4.0 - 15.0 %    Eosinophil % 23.4 (H) 0.0 - 8.0 %    Basophil % 0.6 0.0 - 1.9 %    Differential Method Automated         I have reviewed the pertinent labs from 12/19/22 which are notable for normal blood counts.  Cr 1.5, normal LFTs.    Imaging:     12/7/2022 - EUS  Impression:             - Esophageal mucosal changes consistent with Paredes's esophagus.   - Partially obstructing, malignant esophageal tumor was found in the lower third of the esophagus.   - Normal stomach.   - Normal examined duodenum.   - A mass was found in the lower third of the esophagus. A tissue diagnosis was obtained prior to this exam. This is of adenocarcinoma. This was staged T3 (based on invasion into) N0 Mx by endosonographic criteria.   - No specimens collected.     12/8/2022 - PET CT   Impression:  - Distal esophageal wall thickening and hypermetabolic activity in keeping with known esophageal cancer.  - No definite evidence of metastatic disease.  Subcentimeter gastrohepatic and perigastric lymph nodes without appreciable uptake.    I have personally reviewed the imaging which is notable for mass in distal esophagus without evidence of distant metastatic disease.    Path:     Final Pathologic Diagnosis  Abnormal   Gastroesophageal junction, biopsy:   - Adenocarcinoma.   - Background intestinal metaplasia with low and high-grade dysplasia.   - Focally suspicious for lymphovascular invasion.   - MMR pending, results will be issued in addendum.   - See comment.   COMMENT:  The biopsy shows adenocarcinoma with single cell infiltration of   the lamina propria highlighted on cytokeratin stain arising in a background   of intestinal metaplasia with low and high-grade dysplasia.  There is an area   suspicious for lymphovascular invasion on routine H&E sections however this   area is not confirmed on levels or immunostain for CD 34.            Assessment:       1. Esophageal adenocarcinoma    2. Dysphagia, unspecified type    3. Immunodeficiency secondary to neoplasm    4. Type 2 diabetes mellitus with stage 3 chronic kidney disease, without long-term current use of insulin, unspecified whether stage 3a or 3b CKD    5. CKD stage 3 due to type 2 diabetes mellitus    6. Hypertension  associated with diabetes    7. Hyperlipidemia associated with type 2 diabetes mellitus    8. HFrEF (heart failure with reduced ejection fraction)    9. Paroxysmal atrial fibrillation    10. Chronic anticoagulation    11. Coronary artery disease involving native coronary artery of native heart without angina pectoris    12. Aortic atherosclerosis          Plan:     # Esophageal adenocarcinoma   Mr. Person is a pleasant 67 year old male who presents to our clinic for management of recently diagnosed esophageal cancer. He initially presented with dysphagia, and further workup confirmed a stage II adenocarcinoma. Tumor staged T3N0Mx by endosonographic criteria, JEVON. CT CAP on 12/14/22 confirmed no evidence of metastatic disease.    We had an in-depth conversation regarding his diagnosis and treatment options. We recommend perioperative chemo/radiation per the CROSS trial. Discussed chemoradiation for ~5 weeks with 5 doses of weekly carboplatin and taxol administered. Plan to obtain restaging scans 4-5 weeks after radiation completed.     He also appeared to be a candidate for a cooperative group trial assessing perioperative immunotherapy treatment. He consented for this study - ECOG-ACRIN [id number]: A Phase II/III Study of Dilcia-operative Nivolumab and Ipilimumab in Patients with Locoregional Esophageal and Gastroesophageal Junction Adenocarcinoma    Met with Dr. Santana Brown who agreed he was a surgical candidate.  Met with Dr. Javier Moulton who will be treating him with radiation.    Dysphagia stable at this time. Seeing nutrition.    RTC next week 12/29/22 prior to first cycle with labs.    # HTN, HLD, DM, CKD  Following with PCP Dr. Wilson and nephrologist Dr. Oconnell.   BP and glucose controlled in clinic today.   Creatinine 1.5 on most recent labs.   Continue medical management.   Monitor on treatment.     # CAD, A fib, CHF  Following with cardiologist Dr. Nick.   Currently asymptomatic.   On Xarelto.   Continue  medical management.     Follow up: next week to start chemoRT.    Patient is in agreement with the proposed treatment plan. All questions were answered to the patient's satisfaction. Pt knows to call clinic if anything is needed before the next clinic visit.    Miki Medrano MD  Hematology/Oncology  [facility]      Route Chart for Scheduling    Med Onc Chart Routing      Follow up with physician . Per research   Follow up with SAMUEL    Infusion scheduling note    Injection scheduling note    Labs    Imaging    Pharmacy appointment    Other referrals        Treatment Plan Information   OP ESOPHAGEAL PACLITAXEL CARBOPLATIN WEEKLY   Miki Medrano MD   Upcoming Treatment Dates - OP ESOPHAGEAL PACLITAXEL CARBOPLATIN WEEKLY    12/21/2022       Pre-Medications       palonosetron (ALOXI) 0.25 mg with Dexamethasone (DECADRON) 12 mg in NS 50 mL IVPB       famotidine (PF) injection 20 mg       diphenhydrAMINE (BENADRYL) 50 mg in sodium chloride 0.9% 50 mL IVPB       Chemotherapy       PACLitaxeL (TAXOL) 50 mg/m2 = 120 mg in sodium chloride 0.9% 250 mL chemo infusion       CARBOplatin (PARAPLATIN) in sodium chloride 0.9% 250 mL chemo infusion  12/28/2022       Pre-Medications       palonosetron (ALOXI) 0.25 mg with Dexamethasone (DECADRON) 12 mg in NS 50 mL IVPB       famotidine (PF) injection 20 mg       diphenhydrAMINE (BENADRYL) 50 mg in sodium chloride 0.9% 50 mL IVPB       Chemotherapy       PACLitaxeL (TAXOL) 50 mg/m2 = 120 mg in sodium chloride 0.9% 250 mL chemo infusion       CARBOplatin (PARAPLATIN) in sodium chloride 0.9% 250 mL chemo infusion  1/4/2023       Pre-Medications       palonosetron (ALOXI) 0.25 mg with Dexamethasone (DECADRON) 12 mg in NS 50 mL IVPB       famotidine (PF) injection 20 mg       diphenhydrAMINE (BENADRYL) 50 mg in sodium chloride 0.9% 50 mL IVPB       Chemotherapy       PACLitaxeL (TAXOL) 50 mg/m2 = 120 mg in sodium chloride 0.9% 250 mL chemo  infusion       CARBOplatin (PARAPLATIN) in sodium chloride 0.9% 250 mL chemo infusion  1/11/2023       Pre-Medications       palonosetron (ALOXI) 0.25 mg with Dexamethasone (DECADRON) 12 mg in NS 50 mL IVPB       famotidine (PF) injection 20 mg       diphenhydrAMINE (BENADRYL) 50 mg in sodium chloride 0.9% 50 mL IVPB       Chemotherapy       PACLitaxeL (TAXOL) 50 mg/m2 = 120 mg in sodium chloride 0.9% 250 mL chemo infusion       CARBOplatin (PARAPLATIN) in sodium chloride 0.9% 250 mL chemo infusion

## 2022-12-20 ENCOUNTER — RESEARCH ENCOUNTER (OUTPATIENT)
Dept: RESEARCH | Facility: HOSPITAL | Age: 67
End: 2022-12-20
Payer: MEDICARE

## 2022-12-20 NOTE — PROGRESS NOTES
: URIEL Manriquez MD  Treating Investigators: NIRAV Medrano MD  Consulting Surgical Oncologist: Gerri Zhang NP  Radiation Oncologist: Javier Moulton M.D.     Protocol: ECOG-ACRIN [id number]  IRB#: [id number]    A Phase II/III Study of Dilcia-operative Nivolumab and Ipilimumab in Patients with Locoregional Esophageal and Gastroesophageal Junction Adenocarcinoma       Baseline Visit Note: 19Dec2022  Patient presented to clinic today unaccompanied for a baseline visit, as the SOC procedures during his consultation visit fell out of protocol window. Patient queried & confirms his willingness to continue his participation in the above-mentioned clinical trial. Patient queried & denies any changes to his health or ConMeds since his last visit. Patient asked the following questions re: clinical trial - the planned visit schedule & coordination with RT department, potential side effects that were presented in the Powerpoint that nurse navigator Oraliasharon Jackson had e-mailed to him, especially regarding constipation prophylaxis w/ Miralax, and what he can bring to his infusion appointments. MACARIO & Dr. Medrano answered all patient questions to his satisfaction & denies any additional questions at this time.     Patient had his eligibility labs (CBC, CMP, magnesium, phosphorus, Hep B sAg & HCV RNA), complete PE, VS w/ weight, and ECOG (ECOG = 0, per Dr. Medrano) completed today. Patient was reminded that he consented to the optional stool collection lab, but kits had not been received   Patient was informed that once all lab results have been received, CRC & MD will review his eligibility and randomize him in Step 1 to either SOC Carbo/Taxol or Carbo/Taxol/Nivo within the coming days, and CRC will call patient to inform him of his treatment assignment & re-confirm his schedule. Patient was instructed to contact MACARIO or Dr. Medrano's office with any questions or concerns. Patient verbalized understanding. Patient's Week  1 appointments are scheduled as below.    Week 1:  [id number] @ 0810 - labs @ 3rd floor  [id number] @ 0800 - [facility] 2nd floor  [id number] @ 1000 - infusion

## 2022-12-21 ENCOUNTER — CLINICAL SUPPORT (OUTPATIENT)
Dept: HEMATOLOGY/ONCOLOGY | Facility: CLINIC | Age: 67
End: 2022-12-21
Payer: MEDICARE

## 2022-12-21 ENCOUNTER — TELEPHONE (OUTPATIENT)
Dept: HEMATOLOGY/ONCOLOGY | Facility: CLINIC | Age: 67
End: 2022-12-21

## 2022-12-21 ENCOUNTER — DOCUMENTATION ONLY (OUTPATIENT)
Dept: HEMATOLOGY/ONCOLOGY | Facility: CLINIC | Age: 67
End: 2022-12-21
Payer: MEDICARE

## 2022-12-21 ENCOUNTER — TELEPHONE (OUTPATIENT)
Dept: INTERVENTIONAL RADIOLOGY/VASCULAR | Facility: HOSPITAL | Age: 67
End: 2022-12-21
Payer: MEDICARE

## 2022-12-21 ENCOUNTER — RESEARCH ENCOUNTER (OUTPATIENT)
Dept: RESEARCH | Facility: HOSPITAL | Age: 67
End: 2022-12-21
Payer: MEDICARE

## 2022-12-21 DIAGNOSIS — I48.0 PAROXYSMAL ATRIAL FIBRILLATION: Primary | ICD-10-CM

## 2022-12-21 NOTE — PROGRESS NOTES
: URIEL Manriquez MD  Treating Investigators: NIRAV Medrano MD  Consulting Surgical Oncologist: Gerri Zhang NP  Radiation Oncologist: Javier Moulton M.D.     Protocol: [facility]-ACRIN [id number]  IRB#: [id number]  Patient ID: [id number]  Treatment: Arm B - Carbo+Taxol+Nivolumab    A Phase II/III Study of Dilcia-operative Nivolumab and Ipilimumab in Patients with Locoregional Esophageal and Gastroesophageal Junction Adenocarcinoma       Eligibility / Randomization Note: 21Dec2022  Patient's final assessments for eligibility were received today & lead CRC & second CRC reviewed all inclusion and exclusion criteria, and patient appears eligible to randomize to Step 1 on the above-mentioned study. Eligibility was also reviewed by treating physician Dr. Medrano whom deemed patient eligible to randomize on study. CRC randomized patient on Step 1, and patient was assigned to treatment Arm B, Carboplatin + Paclitaxel + Nivolumab. MD informed & will change the treatment plan in [facility].   CRC called patient at 1424 to inform him of his treatment assignment & confirmed his Week 1 visit dates and times. Patient queried & verbalized understanding of his treatment assignment and denies any questions or concerns at this time.        Week 1:  [id number] @ 0810 - labs @ 3rd floor  [id number] @ 0800 - [facility] 2nd floor  [id number] @ 1000 - infusion -- Carbo+Taxol+Nivo

## 2022-12-21 NOTE — PROGRESS NOTES
Social Work Consult    Name:Lukasz Person  : 1955  MRN: [id number]    Referral:Transportation    SW received consult from Oneyda Jackson RN. Patient would like to talk to [facility] about transportation.    SW spoke to patient and introduced self. Patient explained that his wife was working with remotely while caring for her father at home. Now that father has returned home wife is going back to work. Patient concerned that he may not always have a ride available.    SW explained that rides could be set up in an emergency. SW emailed patient an application for the [facility] with instructions.

## 2022-12-21 NOTE — NURSING
Left a message on pts voicemail. Instructed pt to arrive at 0700 to [facility] on 12/22 for port placement, NPO after midnight, take am meds with with a small sip of water, hold all am diabetic medication, bring a current list of meds, and arrange a ride to and from procedure. Left a call back number for any further questions.

## 2022-12-22 ENCOUNTER — HOSPITAL ENCOUNTER (OUTPATIENT)
Dept: INTERVENTIONAL RADIOLOGY/VASCULAR | Facility: HOSPITAL | Age: 67
Discharge: HOME | End: 2022-12-22
Attending: INTERNAL MEDICINE
Payer: MEDICARE

## 2022-12-22 ENCOUNTER — TELEPHONE (OUTPATIENT)
Dept: HEMATOLOGY/ONCOLOGY | Facility: CLINIC | Age: 67
End: 2022-12-22
Payer: MEDICARE

## 2022-12-22 VITALS
OXYGEN SATURATION: 96 % | BODY MASS INDEX: 35.49 KG/M2 | HEART RATE: 51 BPM | DIASTOLIC BLOOD PRESSURE: 78 MMHG | RESPIRATION RATE: 16 BRPM | SYSTOLIC BLOOD PRESSURE: 159 MMHG | WEIGHT: 262 LBS | TEMPERATURE: 98 F | HEIGHT: 72 IN

## 2022-12-22 DIAGNOSIS — C15.9 ESOPHAGEAL ADENOCARCINOMA: ICD-10-CM

## 2022-12-22 LAB — POCT GLUCOSE: 162 MG/DL (ref 70–110)

## 2022-12-22 PROCEDURE — 76937 US GUIDE VASCULAR ACCESS: CPT | Mod: 26,,, | Performed by: RADIOLOGY

## 2022-12-22 PROCEDURE — 99152 MOD SED SAME PHYS/QHP 5/>YRS: CPT | Performed by: RADIOLOGY

## 2022-12-22 PROCEDURE — 99152 PR MOD CONSCIOUS SEDATION, SAME PHYS, 5+ YRS, FIRST 15 MIN: ICD-10-PCS | Mod: ,,, | Performed by: RADIOLOGY

## 2022-12-22 PROCEDURE — 25000003 PHARM REV CODE 250: Performed by: RADIOLOGY

## 2022-12-22 PROCEDURE — C1894 INTRO/SHEATH, NON-LASER: HCPCS

## 2022-12-22 PROCEDURE — C1788 PORT, INDWELLING, IMP: HCPCS

## 2022-12-22 PROCEDURE — 99153 MOD SED SAME PHYS/QHP EA: CPT

## 2022-12-22 PROCEDURE — 36561 IR TUNNELED CATH PLACEMENT WITH PORT: ICD-10-PCS | Mod: RT,,, | Performed by: RADIOLOGY

## 2022-12-22 PROCEDURE — 99152 MOD SED SAME PHYS/QHP 5/>YRS: CPT | Mod: ,,, | Performed by: RADIOLOGY

## 2022-12-22 PROCEDURE — 76937 US GUIDE VASCULAR ACCESS: CPT | Mod: TC | Performed by: RADIOLOGY

## 2022-12-22 PROCEDURE — 63600175 PHARM REV CODE 636 W HCPCS: Performed by: RADIOLOGY

## 2022-12-22 PROCEDURE — 99153 MOD SED SAME PHYS/QHP EA: CPT | Performed by: RADIOLOGY

## 2022-12-22 PROCEDURE — 99152 MOD SED SAME PHYS/QHP 5/>YRS: CPT

## 2022-12-22 PROCEDURE — 77001 FLUOROGUIDE FOR VEIN DEVICE: CPT | Mod: TC | Performed by: RADIOLOGY

## 2022-12-22 PROCEDURE — 77001 CHG FLUOROGUIDE CNTRL VEN ACCESS,PLACE,REPLACE,REMOVE: ICD-10-PCS | Mod: 26,,, | Performed by: RADIOLOGY

## 2022-12-22 PROCEDURE — 77001 FLUOROGUIDE FOR VEIN DEVICE: CPT | Mod: 26,,, | Performed by: RADIOLOGY

## 2022-12-22 PROCEDURE — 36561 INSERT TUNNELED CV CATH: CPT | Performed by: RADIOLOGY

## 2022-12-22 PROCEDURE — 76937 PR  US GUIDE, VASCULAR ACCESS: ICD-10-PCS | Mod: 26,,, | Performed by: RADIOLOGY

## 2022-12-22 RX ORDER — MIDAZOLAM HYDROCHLORIDE 1 MG/ML
INJECTION INTRAMUSCULAR; INTRAVENOUS
Status: COMPLETED | OUTPATIENT
Start: 2022-12-22 | End: 2022-12-22

## 2022-12-22 RX ORDER — HEPARIN SOD,PORCINE/0.9 % NACL 1000/500ML
INTRAVENOUS SOLUTION INTRAVENOUS
Status: COMPLETED | OUTPATIENT
Start: 2022-12-22 | End: 2022-12-22

## 2022-12-22 RX ORDER — LIDOCAINE HYDROCHLORIDE 10 MG/ML
INJECTION INFILTRATION; PERINEURAL
Status: COMPLETED | OUTPATIENT
Start: 2022-12-22 | End: 2022-12-22

## 2022-12-22 RX ORDER — SODIUM CHLORIDE 9 MG/ML
INJECTION, SOLUTION INTRAVENOUS
Status: COMPLETED | OUTPATIENT
Start: 2022-12-22 | End: 2022-12-22

## 2022-12-22 RX ORDER — CEFAZOLIN SODIUM 2 G/50ML
2 SOLUTION INTRAVENOUS
Status: COMPLETED | OUTPATIENT
Start: 2022-12-22 | End: 2022-12-22

## 2022-12-22 RX ORDER — HEPARIN 100 UNIT/ML
SYRINGE INTRAVENOUS
Status: COMPLETED | OUTPATIENT
Start: 2022-12-22 | End: 2022-12-22

## 2022-12-22 RX ORDER — FENTANYL CITRATE 50 UG/ML
INJECTION, SOLUTION INTRAMUSCULAR; INTRAVENOUS
Status: COMPLETED | OUTPATIENT
Start: 2022-12-22 | End: 2022-12-22

## 2022-12-22 RX ADMIN — HEPARIN 5 ML: 100 SYRINGE at 09:12

## 2022-12-22 RX ADMIN — LIDOCAINE HYDROCHLORIDE 10 ML: 10 INJECTION, SOLUTION INFILTRATION; PERINEURAL at 09:12

## 2022-12-22 RX ADMIN — MIDAZOLAM HYDROCHLORIDE 1 MG: 1 INJECTION, SOLUTION INTRAMUSCULAR; INTRAVENOUS at 09:12

## 2022-12-22 RX ADMIN — Medication 500 ML: at 09:12

## 2022-12-22 RX ADMIN — SODIUM CHLORIDE 500 ML: 0.9 INJECTION, SOLUTION INTRAVENOUS at 09:12

## 2022-12-22 RX ADMIN — CEFAZOLIN SODIUM 2 G: 2 SOLUTION INTRAVENOUS at 07:12

## 2022-12-22 RX ADMIN — FENTANYL CITRATE 50 MCG: 50 INJECTION, SOLUTION INTRAMUSCULAR; INTRAVENOUS at 09:12

## 2022-12-22 NOTE — H&P
Interventional Radiology Pre-Procedure History & Physical      Chief Complaint/Reason for Referral: Esophageal ca    History of Present Illness:  Lukasz Person is a 67 y.o. male who presents with esophogeal ca. Referred for chest port placement.    Past Medical History:   Diagnosis Date    Anticoagulant long-term use     Diabetes mellitus     Onset late 50s/early 60s    Hyperlipidemia     Hypertension     Onset late 50s/early 60s    Kidney stones     Sleep apnea     since 2006     Past Surgical History:   Procedure Laterality Date    CORONARY ANGIOGRAPHY N/A 9/30/2020    Procedure: ANGIOGRAM, CORONARY ARTERY;  Surgeon: John West MD;  Location: [facility] CATH LAB;  Service: Cardiology;  Laterality: N/A;    CYSTOSCOPY N/A 2/17/2022    Procedure: CYSTOSCOPY;  Surgeon: Lukasz Hughes MD;  Location: [facility] OR [address] [place] [address];  Service: Urology;  Laterality: N/A;    CYSTOSCOPY W/ URETERAL STENT PLACEMENT N/A 2/25/2022    Procedure: CYSTOSCOPY, WITH URETERAL STENT INSERTION;  Surgeon: Lukasz Hughes MD;  Location: [facility] OR [address] [place] [address];  Service: Urology;  Laterality: N/A;    ENDOSCOPIC ULTRASOUND OF UPPER GASTROINTESTINAL TRACT N/A 12/7/2022    Procedure: ULTRASOUND, UPPER GI TRACT, ENDOSCOPIC;  Surgeon: Darian Main MD;  Location: [facility] ENDO;  Service: Endoscopy;  Laterality: N/A;  Approval to hold Xarelto rec'd from Dr. Nick (see t/e 12/5/22)-DS    ESOPHAGOGASTRODUODENOSCOPY N/A 11/17/2022    Procedure: EGD (ESOPHAGOGASTRODUODENOSCOPY);  Surgeon: Brody Gonzales MD;  Location: [facility] (2ND FLR);  Service: Endoscopy;  Laterality: N/A;  inst via email-ok to hold Xarelto x 2 days-MS    LASER LITHOTRIPSY Left 2/25/2022    Procedure: LITHOTRIPSY, USING LASER;  Surgeon: Lukasz Hughes MD;  Location: [facility] OR [address] [place] [address];  Service: Urology;  Laterality: Left;    LEFT HEART CATHETERIZATION Left 9/30/2020    Procedure: Left heart cath;  Surgeon: John West MD;  Location: [facility] CATH LAB;  Service: Cardiology;   Laterality: Left;    LITHOTRIPSY      PARATHYROIDECTOMY  1/1/2-107    PYELOSCOPY Left 2/25/2022    Procedure: PYELOSCOPY;  Surgeon: Lukasz Hughes MD;  Location: [facility] OR [address] [place] [address];  Service: Urology;  Laterality: Left;    TRANSESOPHAGEAL ECHOCARDIOGRAPHY N/A 4/7/2022    Procedure: ECHOCARDIOGRAM, TRANSESOPHAGEAL;  Surgeon: Xander Diagnostic Provider;  Location: [facility] EP LAB;  Service: Cardiology;  Laterality: N/A;    TREATMENT OF CARDIAC ARRHYTHMIA N/A 4/7/2022    Procedure: Cardioversion or Defibrillation;  Surgeon: Iris Fisher NP;  Location: [facility] EP LAB;  Service: Cardiology;  Laterality: N/A;  afib, dccv, artie, anes, EH, 3prep    URETEROSCOPIC REMOVAL OF URETERIC CALCULUS Left 2/25/2022    Procedure: REMOVAL, CALCULUS, URETER, URETEROSCOPIC;  Surgeon: Lukasz Hughes MD;  Location: [facility] OR [address] [place] [address];  Service: Urology;  Laterality: Left;    URETEROSCOPY Left 2/25/2022    Procedure: URETEROSCOPY;  Surgeon: Lukasz Hughes MD;  Location: [facility] OR [address] [place] [address];  Service: Urology;  Laterality: Left;       Allergies:   Review of patient's allergies indicates:  No Known Allergies     Home Meds:   Prior to Admission medications    Medication Sig Start Date End Date Taking? Authorizing Provider   ACCU-CHEK SOFTCLIX LANCETS Misc USE EVERY DAY 1/10/22   Maria Esther Palacios NP   allopurinoL (ZYLOPRIM) 100 MG tablet TAKE 1 TABLET BY MOUTH EVERY DAY 10/12/22   Kirk Wilson MD   blood sugar diagnostic (ACCU-CHEK ANTONELLA PLUS TEST STRP) Strp Inject 1 strip into the skin 2 (two) times daily before meals. 12/13/21   Maria Esther Palacios NP   blood-glucose meter kit Checks blood sugars 1x/daily. 1/4/21   Maria Esther Palacios NP   glimepiride (AMARYL) 2 MG tablet TAKE 2 TABLETS BY MOUTH EVERY MORNING 11/15/22   Kirk Wilson MD   lisinopriL-hydrochlorothiazide (PRINZIDE,ZESTORETIC) 20-25 mg Tab TAKE 1 TABLET BY MOUTH EVERY DAY 10/27/22   Kirk Wilson MD   metFORMIN (GLUCOPHAGE) 500 MG tablet TAKE 2 TABLETS BY MOUTH EVERY MORNING AND 2 TABLETS WITH DINNER  12/7/21   Kirk Wilson MD   metoprolol succinate (TOPROL-XL) 25 MG 24 hr tablet Take 1 tablet (25 mg total) by mouth once daily. 4/7/22 4/7/23  Iris Fisher NP   nitroGLYCERIN (NITROSTAT) 0.4 MG SL tablet Place 1 tablet (0.4 mg total) under the tongue every 5 (five) minutes as needed for Chest pain. If you need a third tablet, call 911  Patient not taking: Reported on 12/14/2022 12/22/20   Idania Johnson NP   omeprazole (PRILOSEC) 40 MG capsule Take 1 capsule (40 mg total) by mouth once daily. 11/18/22   Kirk Wilson MD   potassium citrate (UROCIT-K) 10 mEq (1,080 mg) TbSR Take 1 tablet (10 mEq total) by mouth 3 (three) times daily with meals. 9/13/22   Karol Oconnell MD   rivaroxaban (XARELTO) 20 mg Tab Take 1 tablet (20 mg total) by mouth daily with dinner or evening meal. 3/6/22 3/6/23  Casandra Thorpe PA-C   rosuvastatin (CRESTOR) 20 MG tablet TAKE 1 TABLET(20 MG) BY MOUTH EVERY DAY 8/31/22   Pallavi Nick MD   tamsulosin (FLOMAX) 0.4 mg Cap Take 1 capsule (0.4 mg total) by mouth once daily. 10/13/22 10/13/23  Ashlyn Chapa NP   testosterone (ANDROGEL) 20.25 mg/1.25 gram (1.62 %) GlPm Apply 4 pumps to shoulders daily 10/13/22   Ashlyn Chapa NP       Anticoagulation/Antiplatelet Meds: Xarelto    Review of Systems:   Hematological: no known coagulopathies  Respiratory: no shortness of breath  Cardiovascular: no chest pain  Gastrointestinal: no abdominal pain  Genitourinary: no dysuria  Musculoskeletal: negative  Neurological: no TIA or stroke symptoms     Physical Exam:       General: WNWD, NAD  HEENT: Normocephalic, sclera anicteric, oropharynx clear  Neck: Supple, no palpable lymphadenopathy  Heart: RRR  Lungs: Symmetric excursions, breathing unlabored  Abd: NTND, soft  Extremities: MAEW, no CCE  Pulses: 2+ DP b/l  Neuro: Gross nonfocal    Laboratory:  Lab Results   Component Value Date    INR 1.1 04/01/2022       Lab Results   Component Value Date    WBC 8.26 12/19/2022    HGB 14.9 12/19/2022     HCT 46.8 12/19/2022    MCV 91 12/19/2022     12/19/2022      Lab Results   Component Value Date     (H) 12/19/2022     12/19/2022    K 4.9 12/19/2022     12/19/2022    CO2 28 12/19/2022    BUN 16 12/19/2022    CREATININE 1.5 (H) 12/19/2022    CALCIUM 9.8 12/19/2022    MG 1.6 12/19/2022    ALT 16 12/19/2022    AST 19 12/19/2022    ALBUMIN 3.9 12/19/2022    BILITOT 0.5 12/19/2022    BILIDIR 0.3 10/04/2022       Imaging:  CT chest 12/14/22 reviewed.    Assessment/Plan:  67 y.o. male with esophogeal ca. Will undergo chest port palcement today.    Sedation:  Sedation history: have not been any systemic reactions  ASA: 3 / Mallampati: 3  Sedation plan: Up to moderate (Versed, fentanyl)     Risks (including, but not limited to, pain, bleeding, infection, damage to nearby structures, treatment failure/recurrence, and the need for additional procedures), potential benefits, and alternatives were discussed with the patient. All questions were answered to the best of my abilities. The patient wishes to proceed. Written informed consent was obtained.      Andrew Marsala MD [facility]  Pager [phone]

## 2022-12-22 NOTE — PLAN OF CARE
Patient discharged home in stable condition. PIV removed with catheter tip intact. AVS provided and reviewed with patient and spouse at bedside. Patient brought to wife's vehicle via wheelchair. IR care complete.

## 2022-12-22 NOTE — DISCHARGE INSTRUCTIONS
*****************************************************************************************************      Your port insertion/removal site is dressed with gauze and Tega-Derm (looks like Saran Wrap). Underneath the dressing is Derma-Bond (skin glue) and Steri-Strips (looks like white tape). There are also dissolvable sutures on the inside - they will dissolve on their own.    Do NOT remove the dressing today. Do NOT shower/bathe tonight.     You can shower/bathe tomorrow night. Leave the dressing on during your shower/bath. Turn the site away from the shower's spray; if you bathe, do NOT allow the site underwater. After your shower/bath, you may remove the dressing. Gently pat the port site dry.    Your port may remained uncovered. Allow the Steri-Strips and Derma-Bond to come off on their own. You can use any store bought large pad-style bandage to cover the site if your clothing (or bra straps) rubbing against it is uncomfortable to you.    Until the port site heals, usually one to two weeks, DO NOT submerge in a bath tub, swimming pool, or jacuzzi. DO NOT lift/carry anything heavier than a gallon of milk. DO NOT reach above your head.    For insertions: Only trained personnel should access your port. Hand hygiene is VERY important! Anyone, including you, who touches the port site should do so with CLEAN hands. Finally, at the end of whatever activity involving your port is complete (chemo, other infusion, blood draw, etc), it should be flushed with a medicine called Heparin.

## 2022-12-22 NOTE — DISCHARGE SUMMARY
Interventional Radiology Short Stay Discharge Summary      Admit Date: 12/22/2022  Discharge Date: 12/22/2022     Hospital Course: Uneventful    Discharge Diagnosis: Esophageal ca s/p chest port placement today    Discharge Condition: Stable    Discharge Disposition: Home    Diet: Resume prior diet    Activity: Activity as tolerated and no driving for today    Follow-up: With referring provider      [facility]  Pager [phone]

## 2022-12-22 NOTE — PROCEDURES
Interventional Radiology Immediate Post-Procedure Note    Pre-Op Diagnosis: Esophageal ca  Post-Op Diagnosis: Same    Procedure: Chest port placement    Procedure performed by: Emeka Beaulieu MD  Assistants: None    Estimated Blood Loss: Minimal  Specimen Removed: None    Findings/description of procedure:  8-Fr BARD CLEARVUE chest port placed via patent RIGHT IJ vein. Tip near the superior cavoatrial jxn.    No immediate complications. Patient tolerated procedure well. Please see full dictated procedure report for additional details and recommendations.    Recommendations:  Port is ready for immediate use.      Emeka Beaulieu MD  [facility]  Pager [phone]

## 2022-12-23 ENCOUNTER — PATIENT MESSAGE (OUTPATIENT)
Dept: HEMATOLOGY/ONCOLOGY | Facility: CLINIC | Age: 67
End: 2022-12-23
Payer: MEDICARE

## 2022-12-23 ENCOUNTER — DOCUMENTATION ONLY (OUTPATIENT)
Dept: PHARMACY | Facility: HOSPITAL | Age: 67
End: 2022-12-23
Payer: MEDICARE

## 2022-12-23 ENCOUNTER — TELEPHONE (OUTPATIENT)
Dept: HEMATOLOGY/ONCOLOGY | Facility: CLINIC | Age: 67
End: 2022-12-23
Payer: MEDICARE

## 2022-12-23 DIAGNOSIS — Z00.6 CLINICAL TRIAL PARTICIPANT: ICD-10-CM

## 2022-12-23 DIAGNOSIS — C15.9 ESOPHAGEAL ADENOCARCINOMA: Primary | ICD-10-CM

## 2022-12-23 NOTE — TELEPHONE ENCOUNTER
"----- Message from Loc Arguelles sent at 12/23/2022  4:10 PM CST -----  Consult/Advisory:          Name Of Caller: Self      Contact Preference?: [phone] (Mobile)      Provider Name: Marcela      Does patient feel the need to be seen today? No      What is the nature of the call?: Calling to speak w/ nurse about port inquiries          Additional Notes:  "Thank you for all that you do for our patients"      "

## 2022-12-27 ENCOUNTER — PATIENT MESSAGE (OUTPATIENT)
Dept: HEMATOLOGY/ONCOLOGY | Facility: CLINIC | Age: 67
End: 2022-12-27
Payer: MEDICARE

## 2022-12-27 DIAGNOSIS — C15.9 ESOPHAGEAL ADENOCARCINOMA: Primary | ICD-10-CM

## 2022-12-27 DIAGNOSIS — Z00.6 CLINICAL TRIAL PARTICIPANT: ICD-10-CM

## 2022-12-27 DIAGNOSIS — Z79.899 ON ANTINEOPLASTIC CHEMOTHERAPY: ICD-10-CM

## 2022-12-27 NOTE — PLAN OF CARE
DISCONTINUE ON PATHWAY REGIMEN - Gastroesophageal    [id number]        Paclitaxel       Carboplatin           Additional Orders: Given with concurrent chemoradiotherapy.  GCSF   therapy with RT is a relative contraindication.    **Always confirm dose/schedule in your pharmacy ordering system**    REASON: Other Reason  PRIOR TREATMENT: [id number]    CLINICAL TRIAL SELECTED - Gastroesophageal    Other Clinical Trial: [id number]    **Trial eligibility and accrual should be confirmed by your research team**    Patient Characteristics:  Esophageal & GE Junction, Adenocarcinoma, Preoperative or Nonsurgical Candidate   (Clinical Staging), cT2 or Higher or cN+, Surgical Candidate (Up to cT4a) -   Preoperative Therapy, Esophageal  Histology: Adenocarcinoma  Disease Classification: Esophageal  Therapeutic Status: Preoperative or Nonsurgical Candidate (Clinical Staging)  AJCC Grade: GX  AJCC 8 Stage Grouping: III  AJCC T Category: cT3  AJCC N Category: cN0  AJCC M Category: cM0  Intent of Therapy:  Curative Intent, Discussed with Patient

## 2022-12-28 PROCEDURE — 77301 PR  INTEN MOD RADIOTHER PLAN W/DOSE VOL HIST: ICD-10-PCS | Mod: 26,,, | Performed by: STUDENT IN AN ORGANIZED HEALTH CARE EDUCATION/TRAINING PROGRAM

## 2022-12-28 PROCEDURE — 77301 RADIOTHERAPY DOSE PLAN IMRT: CPT | Mod: 26,,, | Performed by: STUDENT IN AN ORGANIZED HEALTH CARE EDUCATION/TRAINING PROGRAM

## 2022-12-28 PROCEDURE — 77301 RADIOTHERAPY DOSE PLAN IMRT: CPT | Mod: TC | Performed by: STUDENT IN AN ORGANIZED HEALTH CARE EDUCATION/TRAINING PROGRAM

## 2022-12-29 ENCOUNTER — RESEARCH ENCOUNTER (OUTPATIENT)
Dept: RESEARCH | Facility: HOSPITAL | Age: 67
End: 2022-12-29
Payer: MEDICARE

## 2022-12-29 ENCOUNTER — INFUSION (OUTPATIENT)
Dept: INFUSION THERAPY | Facility: HOSPITAL | Age: 67
End: 2022-12-29
Payer: MEDICARE

## 2022-12-29 ENCOUNTER — OFFICE VISIT (OUTPATIENT)
Dept: HEMATOLOGY/ONCOLOGY | Facility: CLINIC | Age: 67
End: 2022-12-29
Payer: MEDICARE

## 2022-12-29 ENCOUNTER — PATIENT MESSAGE (OUTPATIENT)
Dept: HEMATOLOGY/ONCOLOGY | Facility: CLINIC | Age: 67
End: 2022-12-29
Payer: MEDICARE

## 2022-12-29 VITALS
SYSTOLIC BLOOD PRESSURE: 150 MMHG | HEIGHT: 72 IN | RESPIRATION RATE: 16 BRPM | DIASTOLIC BLOOD PRESSURE: 72 MMHG | HEART RATE: 58 BPM | WEIGHT: 258.38 LBS | BODY MASS INDEX: 35 KG/M2 | TEMPERATURE: 99 F | OXYGEN SATURATION: 96 %

## 2022-12-29 VITALS
RESPIRATION RATE: 18 BRPM | HEART RATE: 60 BPM | HEIGHT: 72 IN | TEMPERATURE: 98 F | SYSTOLIC BLOOD PRESSURE: 140 MMHG | OXYGEN SATURATION: 95 % | DIASTOLIC BLOOD PRESSURE: 64 MMHG | WEIGHT: 258.38 LBS | BODY MASS INDEX: 35 KG/M2

## 2022-12-29 DIAGNOSIS — C15.9 ESOPHAGEAL ADENOCARCINOMA: Primary | ICD-10-CM

## 2022-12-29 DIAGNOSIS — E11.22 CKD STAGE 3 DUE TO TYPE 2 DIABETES MELLITUS: ICD-10-CM

## 2022-12-29 DIAGNOSIS — E78.5 HYPERLIPIDEMIA ASSOCIATED WITH TYPE 2 DIABETES MELLITUS: ICD-10-CM

## 2022-12-29 DIAGNOSIS — N18.30 TYPE 2 DIABETES MELLITUS WITH STAGE 3 CHRONIC KIDNEY DISEASE, WITHOUT LONG-TERM CURRENT USE OF INSULIN, UNSPECIFIED WHETHER STAGE 3A OR 3B CKD: ICD-10-CM

## 2022-12-29 DIAGNOSIS — R13.10 DYSPHAGIA, UNSPECIFIED TYPE: ICD-10-CM

## 2022-12-29 DIAGNOSIS — Z00.6 CLINICAL TRIAL PARTICIPANT: ICD-10-CM

## 2022-12-29 DIAGNOSIS — E11.59 HYPERTENSION ASSOCIATED WITH DIABETES: ICD-10-CM

## 2022-12-29 DIAGNOSIS — N18.30 CKD STAGE 3 DUE TO TYPE 2 DIABETES MELLITUS: ICD-10-CM

## 2022-12-29 DIAGNOSIS — E11.69 HYPERLIPIDEMIA ASSOCIATED WITH TYPE 2 DIABETES MELLITUS: ICD-10-CM

## 2022-12-29 DIAGNOSIS — E11.22 TYPE 2 DIABETES MELLITUS WITH STAGE 3 CHRONIC KIDNEY DISEASE, WITHOUT LONG-TERM CURRENT USE OF INSULIN, UNSPECIFIED WHETHER STAGE 3A OR 3B CKD: ICD-10-CM

## 2022-12-29 DIAGNOSIS — I15.2 HYPERTENSION ASSOCIATED WITH DIABETES: ICD-10-CM

## 2022-12-29 PROCEDURE — 99999 PR PBB SHADOW E&M-EST. PATIENT-LVL III: ICD-10-PCS | Mod: PBBFAC,,, | Performed by: INTERNAL MEDICINE

## 2022-12-29 PROCEDURE — 77338 DESIGN MLC DEVICE FOR IMRT: CPT | Mod: TC | Performed by: STUDENT IN AN ORGANIZED HEALTH CARE EDUCATION/TRAINING PROGRAM

## 2022-12-29 PROCEDURE — 77470 PR  SPECIAL RADIATION TREATMENT: ICD-10-PCS | Mod: 26,59,, | Performed by: STUDENT IN AN ORGANIZED HEALTH CARE EDUCATION/TRAINING PROGRAM

## 2022-12-29 PROCEDURE — 77338 DESIGN MLC DEVICE FOR IMRT: CPT | Mod: 26,,, | Performed by: STUDENT IN AN ORGANIZED HEALTH CARE EDUCATION/TRAINING PROGRAM

## 2022-12-29 PROCEDURE — 99215 OFFICE O/P EST HI 40 MIN: CPT | Mod: Q1,S$PBB,, | Performed by: INTERNAL MEDICINE

## 2022-12-29 PROCEDURE — 77014 PR  CT GUIDANCE PLACEMENT RAD THERAPY FIELDS: ICD-10-PCS | Mod: 26,,, | Performed by: STUDENT IN AN ORGANIZED HEALTH CARE EDUCATION/TRAINING PROGRAM

## 2022-12-29 PROCEDURE — 77386 HC IMRT, COMPLEX: CPT | Mod: Q1 | Performed by: STUDENT IN AN ORGANIZED HEALTH CARE EDUCATION/TRAINING PROGRAM

## 2022-12-29 PROCEDURE — 99215 PR OFFICE/OUTPT VISIT, EST, LEVL V, 40-54 MIN: ICD-10-PCS | Mod: Q1,S$PBB,, | Performed by: INTERNAL MEDICINE

## 2022-12-29 PROCEDURE — 96367 TX/PROPH/DG ADDL SEQ IV INF: CPT

## 2022-12-29 PROCEDURE — 96413 CHEMO IV INFUSION 1 HR: CPT | Mod: Q1

## 2022-12-29 PROCEDURE — 77470 SPECIAL RADIATION TREATMENT: CPT | Mod: 26,59,, | Performed by: STUDENT IN AN ORGANIZED HEALTH CARE EDUCATION/TRAINING PROGRAM

## 2022-12-29 PROCEDURE — 77300 RADIATION THERAPY DOSE PLAN: CPT | Mod: 26,,, | Performed by: STUDENT IN AN ORGANIZED HEALTH CARE EDUCATION/TRAINING PROGRAM

## 2022-12-29 PROCEDURE — 77470 SPECIAL RADIATION TREATMENT: CPT | Mod: 59,TC,Q1 | Performed by: STUDENT IN AN ORGANIZED HEALTH CARE EDUCATION/TRAINING PROGRAM

## 2022-12-29 PROCEDURE — 96417 CHEMO IV INFUS EACH ADDL SEQ: CPT | Mod: Q1

## 2022-12-29 PROCEDURE — 77014 HC CT GUIDANCE RADIATION THERAPY FLDS PLACEMENT: CPT | Mod: TC,Q1 | Performed by: STUDENT IN AN ORGANIZED HEALTH CARE EDUCATION/TRAINING PROGRAM

## 2022-12-29 PROCEDURE — 96375 TX/PRO/DX INJ NEW DRUG ADDON: CPT

## 2022-12-29 PROCEDURE — 63600175 PHARM REV CODE 636 W HCPCS: Performed by: INTERNAL MEDICINE

## 2022-12-29 PROCEDURE — 25000003 PHARM REV CODE 250: Mod: Q1 | Performed by: INTERNAL MEDICINE

## 2022-12-29 PROCEDURE — 77338 PR  MLC IMRT DESIGN & CONSTRUCTION PER IMRT PLAN: ICD-10-PCS | Mod: 26,,, | Performed by: STUDENT IN AN ORGANIZED HEALTH CARE EDUCATION/TRAINING PROGRAM

## 2022-12-29 PROCEDURE — 99999 PR PBB SHADOW E&M-EST. PATIENT-LVL III: CPT | Mod: PBBFAC,,, | Performed by: INTERNAL MEDICINE

## 2022-12-29 PROCEDURE — 99213 OFFICE O/P EST LOW 20 MIN: CPT | Mod: PBBFAC,25 | Performed by: INTERNAL MEDICINE

## 2022-12-29 PROCEDURE — 77300 PR RADIATION THERAPY,DOSIMETRY PLAN: ICD-10-PCS | Mod: 26,,, | Performed by: STUDENT IN AN ORGANIZED HEALTH CARE EDUCATION/TRAINING PROGRAM

## 2022-12-29 PROCEDURE — 77014 PR  CT GUIDANCE PLACEMENT RAD THERAPY FIELDS: CPT | Mod: 26,,, | Performed by: STUDENT IN AN ORGANIZED HEALTH CARE EDUCATION/TRAINING PROGRAM

## 2022-12-29 PROCEDURE — 77300 RADIATION THERAPY DOSE PLAN: CPT | Mod: TC | Performed by: STUDENT IN AN ORGANIZED HEALTH CARE EDUCATION/TRAINING PROGRAM

## 2022-12-29 RX ORDER — DIPHENHYDRAMINE HYDROCHLORIDE 50 MG/ML
50 INJECTION INTRAMUSCULAR; INTRAVENOUS ONCE AS NEEDED
Status: DISCONTINUED | OUTPATIENT
Start: 2022-12-29 | End: 2022-12-29 | Stop reason: HOSPADM

## 2022-12-29 RX ORDER — SODIUM CHLORIDE 0.9 % (FLUSH) 0.9 %
10 SYRINGE (ML) INJECTION
Status: DISCONTINUED | OUTPATIENT
Start: 2022-12-29 | End: 2022-12-29 | Stop reason: HOSPADM

## 2022-12-29 RX ORDER — HEPARIN 100 UNIT/ML
500 SYRINGE INTRAVENOUS
Status: CANCELLED | OUTPATIENT
Start: 2022-12-29

## 2022-12-29 RX ORDER — EPINEPHRINE 0.3 MG/.3ML
0.3 INJECTION SUBCUTANEOUS ONCE AS NEEDED
Status: DISCONTINUED | OUTPATIENT
Start: 2022-12-29 | End: 2022-12-29 | Stop reason: HOSPADM

## 2022-12-29 RX ORDER — DIPHENHYDRAMINE HYDROCHLORIDE 50 MG/ML
50 INJECTION INTRAMUSCULAR; INTRAVENOUS ONCE AS NEEDED
Status: CANCELLED | OUTPATIENT
Start: 2022-12-29

## 2022-12-29 RX ORDER — FAMOTIDINE 10 MG/ML
20 INJECTION INTRAVENOUS
Status: COMPLETED | OUTPATIENT
Start: 2022-12-29 | End: 2022-12-29

## 2022-12-29 RX ORDER — HEPARIN 100 UNIT/ML
500 SYRINGE INTRAVENOUS
Status: DISCONTINUED | OUTPATIENT
Start: 2022-12-29 | End: 2022-12-29 | Stop reason: HOSPADM

## 2022-12-29 RX ORDER — EPINEPHRINE 0.3 MG/.3ML
0.3 INJECTION SUBCUTANEOUS ONCE AS NEEDED
Status: CANCELLED | OUTPATIENT
Start: 2022-12-29

## 2022-12-29 RX ORDER — SODIUM CHLORIDE 0.9 % (FLUSH) 0.9 %
10 SYRINGE (ML) INJECTION
Status: CANCELLED | OUTPATIENT
Start: 2022-12-29

## 2022-12-29 RX ORDER — FAMOTIDINE 10 MG/ML
20 INJECTION INTRAVENOUS
Status: CANCELLED | OUTPATIENT
Start: 2022-12-29

## 2022-12-29 RX ADMIN — PACLITAXEL 120 MG: 6 INJECTION, SOLUTION, CONCENTRATE INTRAVENOUS at 12:12

## 2022-12-29 RX ADMIN — DIPHENHYDRAMINE HYDROCHLORIDE 50 MG: 50 INJECTION, SOLUTION INTRAMUSCULAR; INTRAVENOUS at 11:12

## 2022-12-29 RX ADMIN — DEXAMETHASONE SODIUM PHOSPHATE 0.25 MG: 4 INJECTION, SOLUTION INTRA-ARTICULAR; INTRALESIONAL; INTRAMUSCULAR; INTRAVENOUS; SOFT TISSUE at 11:12

## 2022-12-29 RX ADMIN — HEPARIN 500 UNITS: 100 SYRINGE at 02:12

## 2022-12-29 RX ADMIN — CARBOPLATIN 215 MG: 10 INJECTION, SOLUTION INTRAVENOUS at 01:12

## 2022-12-29 RX ADMIN — SODIUM CHLORIDE: 9 INJECTION, SOLUTION INTRAVENOUS at 09:12

## 2022-12-29 RX ADMIN — FAMOTIDINE 20 MG: 10 INJECTION INTRAVENOUS at 12:12

## 2022-12-29 NOTE — PLAN OF CARE
Pt here for C1D1 Inv Nivolumab/Taxol/Cisplatin. Assessment complete and labs reviewed. VSS. PAC accessed using sterile technique; blood return noted. Discussed mechanism of action and side effects of medications. Initiated Taxol at 1/4 rate titrating up to full rate. Pt tolerated treatment well; no reaction suspected. Flushed PAC with NS and heparin prior to de-accessing. No questions or concerns. Pt ambulated out of unit unassisted.

## 2022-12-29 NOTE — PROGRESS NOTES
MEDICAL ONCOLOGY - ESTABLISHED PATIENT VISIT    Reason for visit: esophageal adenocarcinoma    Best Contact Phone Number(s): There are no phone numbers on file.     Cancer/Stage/TNM:    Cancer Staging   Esophageal adenocarcinoma  Staging form: Esophagus - Adenocarcinoma, AJCC 8th Edition  - Clinical stage from 11/17/2022: Stage III (cT3, cN0, cM0) - Signed by Javier Moulton MD on 12/14/2022       Oncology History   Esophageal adenocarcinoma   11/17/2022 Cancer Staged    Staging form: Esophagus - Adenocarcinoma, AJCC 8th Edition  - Clinical stage from 11/17/2022: Stage III (cT3, cN0, cM0)     12/8/2022 Initial Diagnosis    Esophageal adenocarcinoma     12/21/2022 - 12/21/2022 Chemotherapy    Treatment Summary   Plan Name: OP ESOPHAGEAL PACLITAXEL CARBOPLATIN WEEKLY  Treatment Goal: Curative  Status: Inactive  Start Date:   End Date:   Provider: Miki Medrano MD  Chemotherapy: CARBOplatin (PARAPLATIN) in sodium chloride 0.9% 250 mL chemo infusion, , Intravenous, Clinic/HOD 1 time, 0 of 1 cycle  PACLitaxeL (TAXOL) 50 mg/m2 = 120 mg in sodium chloride 0.9% 250 mL chemo infusion, 50 mg/m2, Intravenous, Clinic/HOD 1 time, 0 of 1 cycle     12/29/2022 -  Chemotherapy    Treatment Summary   Plan Name: [facility] [id number] ARM B CARBOPLATIN PACLITAXEL NIVOLUMAB  Treatment Goal: Curative  Status: Active  Start Date: 12/29/2022 (Planned)  End Date: 1/26/2023 (Planned)  Provider: Miki Medrano MD  Chemotherapy: CARBOplatin (PARAPLATIN) 215 mg in sodium chloride 0.9% 271.5 mL chemo infusion, 215 mg, Intravenous, Clinic/HOD 1 time, 0 of 5 cycles  PACLitaxeL (TAXOL) 50 mg/m2 = 120 mg in sodium chloride 0.9% 250 mL chemo infusion, 50 mg/m2 = 120 mg, Intravenous, Clinic/HOD 1 time, 0 of 5 cycles          Interim History:   67 y.o. male with esophageal adenocarcinoma who presents for follow-up. Since his last clinic visit he had a port placed and tolerated the procedure well with no acute issues. His dysphagia is stable since  his last visit with him reporting implementing strategies to avoid dysphagia such as avoiding bread, large bites, as well as avoiding mixing fluids and solid foods. Overall, he is anxious regarding starting chemo today but is understanding of the plan of care for neoadjuvant chemo with plans for definitive surgical intervention and adjuvant immunotherapy through clinical trial and was agreeable and understanding.     Presents alone. ECOG 0.     ROS:   Review of Systems   Constitutional:  Negative for appetite change, chills, fatigue, fever and unexpected weight change.   HENT:  Positive for trouble swallowing. Negative for mouth sores, nosebleeds and voice change.    Eyes:  Negative for visual disturbance.   Respiratory:  Negative for cough, shortness of breath and wheezing.    Cardiovascular:  Negative for chest pain, palpitations and leg swelling.   Gastrointestinal:  Negative for abdominal pain, blood in stool, constipation, diarrhea, nausea, reflux (on omeprazole) and fecal incontinence.   Genitourinary:  Negative for bladder incontinence, dysuria, frequency, hematuria and urgency.   Musculoskeletal:  Negative for arthralgias, back pain, gait problem, leg pain, myalgias and neck pain.   Integumentary:  Negative for rash and wound.   Neurological:  Negative for dizziness, facial asymmetry, weakness, light-headedness, headaches, coordination difficulties, memory loss and coordination difficulties.   Hematological:  Does not bruise/bleed easily.   Psychiatric/Behavioral:  Negative for agitation, behavioral problems, confusion and sleep disturbance. The patient is not nervous/anxious.        Past Medical History:   Past Medical History:   Diagnosis Date    Anticoagulant long-term use     Diabetes mellitus     Onset late 50s/early 60s    Hyperlipidemia     Hypertension     Onset late 50s/early 60s    Kidney stones     Sleep apnea     since 2006        Past Surgical History:   Past Surgical History:   Procedure  Laterality Date    CORONARY ANGIOGRAPHY N/A 9/30/2020    Procedure: ANGIOGRAM, CORONARY ARTERY;  Surgeon: John West MD;  Location: [facility] CATH LAB;  Service: Cardiology;  Laterality: N/A;    CYSTOSCOPY N/A 2/17/2022    Procedure: CYSTOSCOPY;  Surgeon: Lukasz Hughes MD;  [facility];  Service: Urology;  Laterality: N/A;    CYSTOSCOPY W/ URETERAL STENT PLACEMENT N/A 2/25/2022    Procedure: CYSTOSCOPY, WITH URETERAL STENT INSERTION;  Surgeon: Lukasz Hughes MD;  [facility];  Service: Urology;  Laterality: N/A;    ENDOSCOPIC ULTRASOUND OF UPPER GASTROINTESTINAL TRACT N/A 12/7/2022    Procedure: ULTRASOUND, UPPER GI TRACT, ENDOSCOPIC;  Surgeon: Darian Main MD;  Location: [facility] ENDO;  Service: Endoscopy;  Laterality: N/A;  Approval to hold Xarelto rec'd from Dr. Nick (see t/e 12/5/22)-DS    ESOPHAGOGASTRODUODENOSCOPY N/A 11/17/2022    Procedure: EGD (ESOPHAGOGASTRODUODENOSCOPY);  Surgeon: Brody Gonzales MD;  Location: [facility] (2ND FLR);  Service: Endoscopy;  Laterality: N/A;  inst via email-ok to hold Xarelto x 2 days-MS    LASER LITHOTRIPSY Left 2/25/2022    Procedure: LITHOTRIPSY, USING LASER;  Surgeon: Lukasz Hughes MD;  Location: [address];  Service: Urology;  Laterality: Left;    LEFT HEART CATHETERIZATION Left 9/30/2020    Procedure: Left heart cath;  Surgeon: John West MD;  Location: [facility] CATH LAB;  Service: Cardiology;  Laterality: Left;    LITHOTRIPSY      PARATHYROIDECTOMY  1/1/2-107    PYELOSCOPY Left 2/25/2022    Procedure: PYELOSCOPY;  Surgeon: Lukasz Hughes MD;  [facility];  Service: Urology;  Laterality: Left;    TRANSESOPHAGEAL ECHOCARDIOGRAPHY N/A 4/7/2022    Procedure: ECHOCARDIOGRAM, TRANSESOPHAGEAL;  Surgeon: [facility] Diagnostic Provider;  Location: [facility] EP LAB;  Service: Cardiology;  Laterality: N/A;    TREATMENT OF CARDIAC ARRHYTHMIA N/A 4/7/2022    Procedure: Cardioversion or Defibrillation;  Surgeon:  Iris Fisher NP;  Location: [facility] EP LAB;  Service: Cardiology;  Laterality: N/A;  afib, dccv, artie, anes, EH, 3prep    URETEROSCOPIC REMOVAL OF URETERIC CALCULUS Left 2022    Procedure: REMOVAL, CALCULUS, URETER, URETEROSCOPIC;  Surgeon: Lukasz Hughes MD;  Location: [facility] OR [address] [place] [address];  Service: Urology;  Laterality: Left;    URETEROSCOPY Left 2022    Procedure: URETEROSCOPY;  Surgeon: Lukasz Hughes MD;  Location: [facility] OR [address] [place] [address];  Service: Urology;  Laterality: Left;        Family History:   Family History   Problem Relation Age of Onset    Arthritis Mother     Heart disease Father 70        CABG    Arthritis Father     Diabetes Father     Kidney disease Father         had one kidney removed in early thirties    Arthritis Sister     Arthritis Sister     Hypertension Sister     Colon cancer Brother 32    Arthritis Brother     Alcohol abuse Paternal Aunt     Cancer Maternal Grandfather         throat cancer    Diabetes Paternal Grandmother     Colon polyps Paternal Grandfather     Colon cancer Paternal Grandfather     Cancer Paternal Grandfather         colon cancer at age 62        Social History:   Social History     Tobacco Use    Smoking status: Former     Packs/day: 1.00     Years: 7.00     Pack years: 7.00     Types: Cigarettes, Cigars     Start date: 1972     Quit date:      Years since quittin.6     Passive exposure: Never    Smokeless tobacco: Never    Tobacco comments:     smoking was off and on.  cumulative 7 years.  never more than three years in one stretch or more lj   Substance Use Topics    Alcohol use: Yes     Alcohol/week: 3.0 standard drinks     Types: 2 Cans of beer, 1 Shots of liquor per week     Comment: don't drink regularly.  6 to 7 monthly      I have reviewed and updated the patient's past medical, surgical, family and social histories.    Allergies:   Review of patient's allergies indicates:  No Known Allergies     Medications:   Current Outpatient  Medications   Medication Sig Dispense Refill    ACCU-CHEK SOFTCLIX LANCETS Misc USE EVERY  each 6    allopurinoL (ZYLOPRIM) 100 MG tablet TAKE 1 TABLET BY MOUTH EVERY DAY 30 tablet 10    blood sugar diagnostic (ACCU-CHEK ANTONELLA PLUS TEST STRP) Strp 1 strip by Misc.(Non-Drug; Combo Route) route 3 (three) times daily before meals. 100 strip 11    blood-glucose meter kit Checks blood sugars 1x/daily. 1 each 12    glimepiride (AMARYL) 2 MG tablet TAKE 2 TABLETS BY MOUTH EVERY MORNING 180 tablet 2    lisinopriL-hydrochlorothiazide (PRINZIDE,ZESTORETIC) 20-25 mg Tab TAKE 1 TABLET BY MOUTH EVERY DAY 90 tablet 3    metFORMIN (GLUCOPHAGE) 500 MG tablet TAKE 2 TABLETS BY MOUTH EVERY MORNING AND 2 TABLETS WITH DINNER 120 tablet 10    metoprolol succinate (TOPROL-XL) 25 MG 24 hr tablet Take 1 tablet (25 mg total) by mouth once daily. 30 tablet 11    nitroGLYCERIN (NITROSTAT) 0.4 MG SL tablet Place 1 tablet (0.4 mg total) under the tongue every 5 (five) minutes as needed for Chest pain. If you need a third tablet, call 911 (Patient not taking: Reported on 12/14/2022) 60 tablet 12    omeprazole (PRILOSEC) 40 MG capsule Take 1 capsule (40 mg total) by mouth once daily. 90 capsule 3    potassium citrate (UROCIT-K) 10 mEq (1,080 mg) TbSR Take 1 tablet (10 mEq total) by mouth 3 (three) times daily with meals. 90 tablet 9    rivaroxaban (XARELTO) 20 mg Tab Take 1 tablet (20 mg total) by mouth daily with dinner or evening meal. 30 tablet 11    rosuvastatin (CRESTOR) 20 MG tablet TAKE 1 TABLET(20 MG) BY MOUTH EVERY DAY 30 tablet 11    tamsulosin (FLOMAX) 0.4 mg Cap Take 1 capsule (0.4 mg total) by mouth once daily. 30 capsule 11    testosterone (ANDROGEL) 20.25 mg/1.25 gram (1.62 %) GlPm Apply 4 pumps to shoulders daily 2 each 5     No current facility-administered medications for this visit.        Physical Exam:   BP (!) 140/64 (BP Location: Right arm, Patient Position: Sitting, BP Method: Large (Automatic))   Pulse 60   Temp  97.9 °F (36.6 °C) (Oral)   Resp 18   Ht 6' (1.829 m)   Wt 117.2 kg (258 lb 6.1 oz)   SpO2 95%   BMI 35.04 kg/m²      ECOG Performance status: 0    Physical Exam  Vitals reviewed.   Constitutional:       General: He is not in acute distress.     Appearance: Normal appearance. He is not ill-appearing, toxic-appearing or diaphoretic.   HENT:      Head: Normocephalic and atraumatic.      Right Ear: External ear normal.      Left Ear: External ear normal.   Eyes:      General: No scleral icterus.     Extraocular Movements: Extraocular movements intact.      Conjunctiva/sclera: Conjunctivae normal.      Pupils: Pupils are equal, round, and reactive to light.   Cardiovascular:      Rate and Rhythm: Normal rate and regular rhythm.      Heart sounds: Normal heart sounds. No murmur heard.  Pulmonary:      Effort: Pulmonary effort is normal. No respiratory distress.      Breath sounds: Normal breath sounds. No wheezing or rales.   Abdominal:      General: Bowel sounds are normal. There is no distension.      Palpations: Abdomen is soft.      Tenderness: There is no abdominal tenderness.   Musculoskeletal:         General: No swelling.      Cervical back: Normal range of motion.   Lymphadenopathy:      Cervical: No cervical adenopathy.   Skin:     Coloration: Skin is not jaundiced.      Findings: No bruising, erythema or rash.   Neurological:      General: No focal deficit present.      Mental Status: He is alert and oriented to person, place, and time. Mental status is at baseline.      Cranial Nerves: No cranial nerve deficit.      Motor: No weakness.      Gait: Gait normal.   Psychiatric:         Mood and Affect: Mood normal.         Behavior: Behavior normal.         Thought Content: Thought content normal.         Judgment: Judgment normal.       Labs:   Recent Results (from the past 48 hour(s))   Comprehensive Metabolic Panel    Collection Time: 12/28/22  8:29 AM   Result Value Ref Range    Sodium 142 136 - 145  mmol/L    Potassium 5.0 3.5 - 5.1 mmol/L    Chloride 103 95 - 110 mmol/L    CO2 30 (H) 23 - 29 mmol/L    Glucose 160 (H) 70 - 110 mg/dL    BUN 17 8 - 23 mg/dL    Creatinine 1.4 0.5 - 1.4 mg/dL    Calcium 9.7 8.7 - 10.5 mg/dL    Total Protein 7.2 6.0 - 8.4 g/dL    Albumin 4.0 3.5 - 5.2 g/dL    Total Bilirubin 0.5 0.1 - 1.0 mg/dL    Alkaline Phosphatase 61 55 - 135 U/L    AST 22 10 - 40 U/L    ALT 17 10 - 44 U/L    Anion Gap 9 8 - 16 mmol/L    eGFR 54.7 (A) >60 mL/min/1.73 m^2   CBC W/ AUTO DIFFERENTIAL    Collection Time: 12/28/22  8:29 AM   Result Value Ref Range    WBC 5.62 3.90 - 12.70 K/uL    RBC 5.09 4.60 - 6.20 M/uL    Hemoglobin 14.9 14.0 - 18.0 g/dL    Hematocrit 47.0 40.0 - 54.0 %    MCV 92 82 - 98 fL    MCH 29.3 27.0 - 31.0 pg    MCHC 31.7 (L) 32.0 - 36.0 g/dL    RDW 14.4 11.5 - 14.5 %    Platelets 143 (L) 150 - 450 K/uL    MPV 10.7 9.2 - 12.9 fL    Immature Granulocytes 0.4 0.0 - 0.5 %    Gran # (ANC) 2.7 1.8 - 7.7 K/uL    Immature Grans (Abs) 0.02 0.00 - 0.04 K/uL    Lymph # 1.5 1.0 - 4.8 K/uL    Mono # 0.4 0.3 - 1.0 K/uL    Eos # 1.0 (H) 0.0 - 0.5 K/uL    Baso # 0.06 0.00 - 0.20 K/uL    nRBC 0 0 /100 WBC    Gran % 47.0 38.0 - 73.0 %    Lymph % 26.9 18.0 - 48.0 %    Mono % 7.7 4.0 - 15.0 %    Eosinophil % 16.9 (H) 0.0 - 8.0 %    Basophil % 1.1 0.0 - 1.9 %    Differential Method Automated    Magnesium    Collection Time: 12/28/22  8:29 AM   Result Value Ref Range    Magnesium 1.8 1.6 - 2.6 mg/dL   PHOSPHORUS    Collection Time: 12/28/22  8:29 AM   Result Value Ref Range    Phosphorus 3.1 2.7 - 4.5 mg/dL   DRUG STUDY SENDOUT, [facility] [place].    Collection Time: 12/28/22  8:29 AM   Result Value Ref Range    Drug Study Test Name Drug Study     Drug Study Specimen Type blood     Drug Study Test Result Result sent directly to physician    CORTISOL, RANDOM    Collection Time: 12/28/22  8:29 AM   Result Value Ref Range    Cortisol 11.10 ug/dL   T4, FREE    Collection Time: 12/28/22  8:29 AM   Result Value Ref Range    Free  T4 1.00 0.71 - 1.51 ng/dL   TSH    Collection Time: 22  8:29 AM   Result Value Ref Range    TSH 2.425 0.400 - 4.000 uIU/mL        I have reviewed the pertinent labs from 22 which are notable for normal blood counts.  Cr 1.5, normal LFTs.    Imagin2022 - EUS  Impression:             - Esophageal mucosal changes consistent with Paredes's esophagus.   - Partially obstructing, malignant esophageal tumor was found in the lower third of the esophagus.   - Normal stomach.   - Normal examined duodenum.   - A mass was found in the lower third of the esophagus. A tissue diagnosis was obtained prior to this exam. This is of adenocarcinoma. This was staged T3 (based on invasion into) N0 Mx by endosonographic criteria.   - No specimens collected.     2022 - PET CT   Impression:  - Distal esophageal wall thickening and hypermetabolic activity in keeping with known esophageal cancer.  - No definite evidence of metastatic disease.  Subcentimeter gastrohepatic and perigastric lymph nodes without appreciable uptake.    I have personally reviewed the imaging which is notable for mass in distal esophagus without evidence of distant metastatic disease.    Path:     Final Pathologic Diagnosis  Abnormal   Gastroesophageal junction, biopsy:   - Adenocarcinoma.   - Background intestinal metaplasia with low and high-grade dysplasia.   - Focally suspicious for lymphovascular invasion.   - MMR pending, results will be issued in addendum.   - See comment.   COMMENT:  The biopsy shows adenocarcinoma with single cell infiltration of   the lamina propria highlighted on cytokeratin stain arising in a background   of intestinal metaplasia with low and high-grade dysplasia.  There is an area   suspicious for lymphovascular invasion on routine H&E sections however this   area is not confirmed on levels or immunostain for CD 34.            Assessment:       1. Esophageal adenocarcinoma    2. Clinical trial participant    3.  Dysphagia, unspecified type    4. Type 2 diabetes mellitus with stage 3 chronic kidney disease, without long-term current use of insulin, unspecified whether stage 3a or 3b CKD    5. CKD stage 3 due to type 2 diabetes mellitus    6. Hypertension associated with diabetes    7. Hyperlipidemia associated with type 2 diabetes mellitus          Plan:     # Esophageal adenocarcinoma   Mr. Person is a pleasant 67 year old male who presents to our clinic for management of recently diagnosed esophageal cancer. He initially presented with dysphagia, and further workup confirmed a stage II adenocarcinoma. Tumor staged T3N0Mx by endosonographic criteria, JEVON. CT CAP on 12/14/22 confirmed no evidence of metastatic disease.    We had an in-depth conversation regarding his diagnosis and treatment options. We recommend perioperative chemo/radiation per the CROSS trial. Discussed chemoradiation for ~5 weeks with 5 doses of weekly carboplatin and taxol administered. Plan to obtain restaging scans 4-5 weeks after radiation completed.     He also appeared to be a candidate for a cooperative group trial assessing perioperative immunotherapy treatment. He consented for this study - ECOG-ACRIN [id number]: A Phase II/III Study of Dilcia-operative Nivolumab and Ipilimumab in Patients with Locoregional Esophageal and Gastroesophageal Junction Adenocarcinoma    Met with Dr. Santana Brown who agreed he was a surgical candidate.  Met with Dr. Javier Moulton who will be treating him with radiation.    Dysphagia stable at this time with adoption of coping strategies  Proceeding with cycle 1 of Cisplatin/Paclitaxel/Nivolumab on 12/29/22  RT through Dr. Moulton starting on 12/29/22  Educated to notify us of any adverse events following initiation of treatment  Follow-up 1/5/23 with Dr. Medrano    # HTN, HLD, DM, CKD  Following with PCP Dr. Wilson and nephrologist Dr. Oconnell.   BP and glucose controlled in clinic today.   Creatinine 1.4 on most recent  labs.   Continue medical management.   Monitor on treatment.     # CAD, A fib, CHF  Following with cardiologist Dr. Nick.   Currently asymptomatic.   On Xarelto.   Continue medical management.     Follow up: next week for continued chemoRT.      Sha El D.O.  Hematology/Oncology Fellow, PGY-IV         Route Chart for Scheduling    Med Onc Chart Routing      Follow up with physician 1 week. Dr. Medrano next week   Follow up with SAMUEL    Infusion scheduling note    Injection scheduling note    Labs CBC, CMP, phosphorus and magnesium   Lab interval:  Labs 1/4/23   Imaging    Pharmacy appointment    Other referrals        Treatment Plan Information   [facility] [id number] ARM B CARBOPLATIN PACLITAXEL NIVOLUMAB   Miki Medrano MD   Upcoming Treatment Dates - [facility] [id number] ARM B CARBOPLATIN PACLITAXEL NIVOLUMAB    12/29/2022       Pre-Medications       palonosetron (ALOXI) 0.25 mg with Dexamethasone (DECADRON) 12 mg in NS 50 mL IVPB       diphenhydrAMINE (BENADRYL) 50 mg in sodium chloride 0.9% 50 mL IVPB       famotidine (PF) injection 20 mg       Chemotherapy       INV nivolumab 240 mg in INV sodium chloride 0.9 % 100 mL chemo infusion       PACLitaxeL (TAXOL) 50 mg/m2 = 120 mg in sodium chloride 0.9% 250 mL chemo infusion       CARBOplatin (PARAPLATIN) 215 mg in sodium chloride 0.9% 271.5 mL chemo infusion  1/5/2023       Pre-Medications       palonosetron (ALOXI) 0.25 mg with Dexamethasone (DECADRON) 12 mg in NS 50 mL IVPB       FAMOTIDINE (PF) 20 MG/2 ML IV SOLN       diphenhydrAMINE (BENADRYL) 50 mg in sodium chloride 0.9% 50 mL IVPB       Chemotherapy       PACLitaxeL (TAXOL) 50 mg/m2 = 120 mg in sodium chloride 0.9% 250 mL chemo infusion       CARBOplatin (PARAPLATIN) 215 mg in sodium chloride 0.9% 271.5 mL chemo infusion  1/12/2023       Pre-Medications       palonosetron (ALOXI) 0.25 mg with Dexamethasone (DECADRON) 12 mg in NS 50 mL IVPB       diphenhydrAMINE (BENADRYL) 50 mg in sodium chloride 0.9% 50 mL  IVPB       FAMOTIDINE (PF) 20 MG/2 ML IV SOLN       Chemotherapy       INV nivolumab 240 mg in INV sodium chloride 0.9 % 100 mL chemo infusion       PACLitaxeL (TAXOL) 50 mg/m2 = 120 mg in sodium chloride 0.9% 250 mL chemo infusion       CARBOplatin (PARAPLATIN) 215 mg in sodium chloride 0.9% 271.5 mL chemo infusion  1/19/2023       Pre-Medications       palonosetron (ALOXI) 0.25 mg with Dexamethasone (DECADRON) 12 mg in NS 50 mL IVPB       FAMOTIDINE (PF) 20 MG/2 ML IV SOLN       diphenhydrAMINE (BENADRYL) 50 mg in sodium chloride 0.9% 50 mL IVPB       Chemotherapy       PACLitaxeL (TAXOL) 50 mg/m2 = 120 mg in sodium chloride 0.9% 250 mL chemo infusion       CARBOplatin (PARAPLATIN) 215 mg in sodium chloride 0.9% 271.5 mL chemo infusion

## 2022-12-30 PROCEDURE — 77386 HC IMRT, COMPLEX: CPT | Mod: Q1 | Performed by: STUDENT IN AN ORGANIZED HEALTH CARE EDUCATION/TRAINING PROGRAM

## 2022-12-30 PROCEDURE — 77014 PR  CT GUIDANCE PLACEMENT RAD THERAPY FIELDS: ICD-10-PCS | Mod: 26,,, | Performed by: STUDENT IN AN ORGANIZED HEALTH CARE EDUCATION/TRAINING PROGRAM

## 2022-12-30 PROCEDURE — 77014 PR  CT GUIDANCE PLACEMENT RAD THERAPY FIELDS: CPT | Mod: 26,,, | Performed by: STUDENT IN AN ORGANIZED HEALTH CARE EDUCATION/TRAINING PROGRAM

## 2022-12-30 PROCEDURE — 77014 HC CT GUIDANCE RADIATION THERAPY FLDS PLACEMENT: CPT | Mod: TC | Performed by: STUDENT IN AN ORGANIZED HEALTH CARE EDUCATION/TRAINING PROGRAM

## 2023-01-03 ENCOUNTER — HOSPITAL ENCOUNTER (OUTPATIENT)
Dept: RADIATION THERAPY | Facility: HOSPITAL | Age: 68
Discharge: HOME | End: 2023-01-03
Attending: STUDENT IN AN ORGANIZED HEALTH CARE EDUCATION/TRAINING PROGRAM
Payer: MEDICARE

## 2023-01-03 PROCEDURE — 77014 HC CT GUIDANCE RADIATION THERAPY FLDS PLACEMENT: CPT | Mod: TC,Q1 | Performed by: STUDENT IN AN ORGANIZED HEALTH CARE EDUCATION/TRAINING PROGRAM

## 2023-01-03 PROCEDURE — 77014 PR  CT GUIDANCE PLACEMENT RAD THERAPY FIELDS: CPT | Mod: 26,,, | Performed by: STUDENT IN AN ORGANIZED HEALTH CARE EDUCATION/TRAINING PROGRAM

## 2023-01-03 PROCEDURE — 77014 PR  CT GUIDANCE PLACEMENT RAD THERAPY FIELDS: ICD-10-PCS | Mod: 26,,, | Performed by: STUDENT IN AN ORGANIZED HEALTH CARE EDUCATION/TRAINING PROGRAM

## 2023-01-03 PROCEDURE — 77386 HC IMRT, COMPLEX: CPT | Performed by: STUDENT IN AN ORGANIZED HEALTH CARE EDUCATION/TRAINING PROGRAM

## 2023-01-04 ENCOUNTER — TELEPHONE (OUTPATIENT)
Dept: HEMATOLOGY/ONCOLOGY | Facility: CLINIC | Age: 68
End: 2023-01-04
Payer: MEDICARE

## 2023-01-04 PROCEDURE — 77386 HC IMRT, COMPLEX: CPT | Mod: Q1 | Performed by: STUDENT IN AN ORGANIZED HEALTH CARE EDUCATION/TRAINING PROGRAM

## 2023-01-04 PROCEDURE — 77014 PR  CT GUIDANCE PLACEMENT RAD THERAPY FIELDS: CPT | Mod: 26,,, | Performed by: STUDENT IN AN ORGANIZED HEALTH CARE EDUCATION/TRAINING PROGRAM

## 2023-01-04 PROCEDURE — 77014 PR  CT GUIDANCE PLACEMENT RAD THERAPY FIELDS: ICD-10-PCS | Mod: 26,,, | Performed by: STUDENT IN AN ORGANIZED HEALTH CARE EDUCATION/TRAINING PROGRAM

## 2023-01-04 PROCEDURE — 77014 HC CT GUIDANCE RADIATION THERAPY FLDS PLACEMENT: CPT | Mod: TC,Q1 | Performed by: STUDENT IN AN ORGANIZED HEALTH CARE EDUCATION/TRAINING PROGRAM

## 2023-01-05 ENCOUNTER — RESEARCH ENCOUNTER (OUTPATIENT)
Dept: RESEARCH | Facility: HOSPITAL | Age: 68
End: 2023-01-05
Payer: MEDICARE

## 2023-01-05 ENCOUNTER — TELEPHONE (OUTPATIENT)
Dept: HEMATOLOGY/ONCOLOGY | Facility: CLINIC | Age: 68
End: 2023-01-05
Payer: MEDICARE

## 2023-01-05 ENCOUNTER — INFUSION (OUTPATIENT)
Dept: INFUSION THERAPY | Facility: HOSPITAL | Age: 68
End: 2023-01-05
Payer: MEDICARE

## 2023-01-05 ENCOUNTER — OFFICE VISIT (OUTPATIENT)
Dept: HEMATOLOGY/ONCOLOGY | Facility: CLINIC | Age: 68
End: 2023-01-05
Payer: MEDICARE

## 2023-01-05 VITALS
TEMPERATURE: 99 F | OXYGEN SATURATION: 96 % | RESPIRATION RATE: 18 BRPM | HEART RATE: 65 BPM | SYSTOLIC BLOOD PRESSURE: 144 MMHG | DIASTOLIC BLOOD PRESSURE: 72 MMHG

## 2023-01-05 VITALS
HEIGHT: 72 IN | OXYGEN SATURATION: 96 % | HEART RATE: 77 BPM | BODY MASS INDEX: 35.17 KG/M2 | SYSTOLIC BLOOD PRESSURE: 130 MMHG | TEMPERATURE: 99 F | WEIGHT: 259.69 LBS | RESPIRATION RATE: 18 BRPM | DIASTOLIC BLOOD PRESSURE: 62 MMHG

## 2023-01-05 DIAGNOSIS — T45.1X5A CHEMOTHERAPY-INDUCED NEUROPATHY: ICD-10-CM

## 2023-01-05 DIAGNOSIS — I48.0 PAROXYSMAL ATRIAL FIBRILLATION: ICD-10-CM

## 2023-01-05 DIAGNOSIS — C15.9 ESOPHAGEAL ADENOCARCINOMA: Primary | ICD-10-CM

## 2023-01-05 DIAGNOSIS — D69.6 THROMBOCYTOPENIA: ICD-10-CM

## 2023-01-05 DIAGNOSIS — N18.30 CKD STAGE 3 DUE TO TYPE 2 DIABETES MELLITUS: ICD-10-CM

## 2023-01-05 DIAGNOSIS — E11.69 HYPERLIPIDEMIA ASSOCIATED WITH TYPE 2 DIABETES MELLITUS: ICD-10-CM

## 2023-01-05 DIAGNOSIS — E11.22 CKD STAGE 3 DUE TO TYPE 2 DIABETES MELLITUS: ICD-10-CM

## 2023-01-05 DIAGNOSIS — R13.10 DYSPHAGIA, UNSPECIFIED TYPE: ICD-10-CM

## 2023-01-05 DIAGNOSIS — G62.0 CHEMOTHERAPY-INDUCED NEUROPATHY: ICD-10-CM

## 2023-01-05 DIAGNOSIS — E11.59 HYPERTENSION ASSOCIATED WITH DIABETES: ICD-10-CM

## 2023-01-05 DIAGNOSIS — N18.30 TYPE 2 DIABETES MELLITUS WITH STAGE 3 CHRONIC KIDNEY DISEASE, WITHOUT LONG-TERM CURRENT USE OF INSULIN, UNSPECIFIED WHETHER STAGE 3A OR 3B CKD: ICD-10-CM

## 2023-01-05 DIAGNOSIS — E78.5 HYPERLIPIDEMIA ASSOCIATED WITH TYPE 2 DIABETES MELLITUS: ICD-10-CM

## 2023-01-05 DIAGNOSIS — I15.2 HYPERTENSION ASSOCIATED WITH DIABETES: ICD-10-CM

## 2023-01-05 DIAGNOSIS — E11.22 TYPE 2 DIABETES MELLITUS WITH STAGE 3 CHRONIC KIDNEY DISEASE, WITHOUT LONG-TERM CURRENT USE OF INSULIN, UNSPECIFIED WHETHER STAGE 3A OR 3B CKD: ICD-10-CM

## 2023-01-05 DIAGNOSIS — I50.20 HFREF (HEART FAILURE WITH REDUCED EJECTION FRACTION): ICD-10-CM

## 2023-01-05 PROCEDURE — A4216 STERILE WATER/SALINE, 10 ML: HCPCS | Performed by: INTERNAL MEDICINE

## 2023-01-05 PROCEDURE — 77386 HC IMRT, COMPLEX: CPT | Mod: Q1 | Performed by: STUDENT IN AN ORGANIZED HEALTH CARE EDUCATION/TRAINING PROGRAM

## 2023-01-05 PROCEDURE — 25000003 PHARM REV CODE 250: Mod: Q1 | Performed by: INTERNAL MEDICINE

## 2023-01-05 PROCEDURE — 77014 PR  CT GUIDANCE PLACEMENT RAD THERAPY FIELDS: ICD-10-PCS | Mod: 26,,, | Performed by: STUDENT IN AN ORGANIZED HEALTH CARE EDUCATION/TRAINING PROGRAM

## 2023-01-05 PROCEDURE — 77336 RADIATION PHYSICS CONSULT: CPT | Performed by: STUDENT IN AN ORGANIZED HEALTH CARE EDUCATION/TRAINING PROGRAM

## 2023-01-05 PROCEDURE — 77014 HC CT GUIDANCE RADIATION THERAPY FLDS PLACEMENT: CPT | Mod: TC,Q1 | Performed by: STUDENT IN AN ORGANIZED HEALTH CARE EDUCATION/TRAINING PROGRAM

## 2023-01-05 PROCEDURE — 99999 PR PBB SHADOW E&M-EST. PATIENT-LVL IV: CPT | Mod: PBBFAC,,, | Performed by: INTERNAL MEDICINE

## 2023-01-05 PROCEDURE — 96417 CHEMO IV INFUS EACH ADDL SEQ: CPT | Mod: Q1

## 2023-01-05 PROCEDURE — 99214 OFFICE O/P EST MOD 30 MIN: CPT | Mod: PBBFAC,25 | Performed by: INTERNAL MEDICINE

## 2023-01-05 PROCEDURE — 99215 PR OFFICE/OUTPT VISIT, EST, LEVL V, 40-54 MIN: ICD-10-PCS | Mod: Q1,S$PBB,, | Performed by: INTERNAL MEDICINE

## 2023-01-05 PROCEDURE — 96375 TX/PRO/DX INJ NEW DRUG ADDON: CPT | Mod: Q1

## 2023-01-05 PROCEDURE — 99999 PR PBB SHADOW E&M-EST. PATIENT-LVL IV: ICD-10-PCS | Mod: PBBFAC,,, | Performed by: INTERNAL MEDICINE

## 2023-01-05 PROCEDURE — 77014 PR  CT GUIDANCE PLACEMENT RAD THERAPY FIELDS: CPT | Mod: 26,,, | Performed by: STUDENT IN AN ORGANIZED HEALTH CARE EDUCATION/TRAINING PROGRAM

## 2023-01-05 PROCEDURE — 99215 OFFICE O/P EST HI 40 MIN: CPT | Mod: Q1,S$PBB,, | Performed by: INTERNAL MEDICINE

## 2023-01-05 PROCEDURE — 96367 TX/PROPH/DG ADDL SEQ IV INF: CPT

## 2023-01-05 PROCEDURE — 63600175 PHARM REV CODE 636 W HCPCS: Mod: Q1 | Performed by: INTERNAL MEDICINE

## 2023-01-05 PROCEDURE — 96413 CHEMO IV INFUSION 1 HR: CPT

## 2023-01-05 RX ORDER — EPINEPHRINE 0.3 MG/.3ML
0.3 INJECTION SUBCUTANEOUS ONCE AS NEEDED
Status: DISCONTINUED | OUTPATIENT
Start: 2023-01-05 | End: 2023-01-05 | Stop reason: HOSPADM

## 2023-01-05 RX ORDER — SODIUM CHLORIDE 0.9 % (FLUSH) 0.9 %
10 SYRINGE (ML) INJECTION
Status: DISCONTINUED | OUTPATIENT
Start: 2023-01-05 | End: 2023-01-05 | Stop reason: HOSPADM

## 2023-01-05 RX ORDER — EPINEPHRINE 0.3 MG/.3ML
0.3 INJECTION SUBCUTANEOUS ONCE AS NEEDED
Status: CANCELLED | OUTPATIENT
Start: 2023-01-05

## 2023-01-05 RX ORDER — DIPHENHYDRAMINE HYDROCHLORIDE 50 MG/ML
50 INJECTION INTRAMUSCULAR; INTRAVENOUS ONCE AS NEEDED
Status: DISCONTINUED | OUTPATIENT
Start: 2023-01-05 | End: 2023-01-05 | Stop reason: HOSPADM

## 2023-01-05 RX ORDER — FAMOTIDINE 10 MG/ML
20 INJECTION INTRAVENOUS
Status: CANCELLED | OUTPATIENT
Start: 2023-01-05 | End: 2023-01-05

## 2023-01-05 RX ORDER — FAMOTIDINE 10 MG/ML
20 INJECTION INTRAVENOUS
Status: COMPLETED | OUTPATIENT
Start: 2023-01-05 | End: 2023-01-05

## 2023-01-05 RX ORDER — DEXAMETHASONE SODIUM PHOSPHATE 4 MG/ML
10 INJECTION, SOLUTION INTRA-ARTICULAR; INTRALESIONAL; INTRAMUSCULAR; INTRAVENOUS; SOFT TISSUE ONCE
Status: DISCONTINUED | OUTPATIENT
Start: 2023-01-05 | End: 2023-01-05 | Stop reason: HOSPADM

## 2023-01-05 RX ORDER — HEPARIN 100 UNIT/ML
500 SYRINGE INTRAVENOUS
Status: DISCONTINUED | OUTPATIENT
Start: 2023-01-05 | End: 2023-01-05 | Stop reason: HOSPADM

## 2023-01-05 RX ORDER — SODIUM CHLORIDE 0.9 % (FLUSH) 0.9 %
10 SYRINGE (ML) INJECTION
Status: CANCELLED | OUTPATIENT
Start: 2023-01-05

## 2023-01-05 RX ORDER — HEPARIN 100 UNIT/ML
500 SYRINGE INTRAVENOUS
Status: CANCELLED | OUTPATIENT
Start: 2023-01-05

## 2023-01-05 RX ORDER — DIPHENHYDRAMINE HYDROCHLORIDE 50 MG/ML
50 INJECTION INTRAMUSCULAR; INTRAVENOUS ONCE AS NEEDED
Status: CANCELLED | OUTPATIENT
Start: 2023-01-05

## 2023-01-05 RX ADMIN — SODIUM CHLORIDE: 9 INJECTION, SOLUTION INTRAVENOUS at 09:01

## 2023-01-05 RX ADMIN — HEPARIN 500 UNITS: 100 SYRINGE at 01:01

## 2023-01-05 RX ADMIN — SODIUM CHLORIDE, PRESERVATIVE FREE 10 ML: 5 INJECTION INTRAVENOUS at 01:01

## 2023-01-05 RX ADMIN — PACLITAXEL 120 MG: 6 INJECTION, SOLUTION, CONCENTRATE INTRAVENOUS at 11:01

## 2023-01-05 RX ADMIN — DIPHENHYDRAMINE HYDROCHLORIDE 50 MG: 50 INJECTION, SOLUTION INTRAMUSCULAR; INTRAVENOUS at 10:01

## 2023-01-05 RX ADMIN — FAMOTIDINE 20 MG: 10 INJECTION INTRAVENOUS at 09:01

## 2023-01-05 RX ADMIN — CARBOPLATIN 230 MG: 10 INJECTION, SOLUTION INTRAVENOUS at 01:01

## 2023-01-05 RX ADMIN — DEXAMETHASONE SODIUM PHOSPHATE 0.25 MG: 4 INJECTION, SOLUTION INTRA-ARTICULAR; INTRALESIONAL; INTRAMUSCULAR; INTRAVENOUS; SOFT TISSUE at 09:01

## 2023-01-05 NOTE — PROGRESS NOTES
MEDICAL ONCOLOGY - ESTABLISHED PATIENT VISIT    Reason for visit: esophageal adenocarcinoma    Best Contact Phone Number(s): There are no phone numbers on file.     Cancer/Stage/TNM:    Cancer Staging   Esophageal adenocarcinoma  Staging form: Esophagus - Adenocarcinoma, AJCC 8th Edition  - Clinical stage from 11/17/2022: Stage III (cT3, cN0, cM0) - Signed by Javier Moulton MD on 12/14/2022       Oncology History   Esophageal adenocarcinoma   11/17/2022 Cancer Staged    Staging form: Esophagus - Adenocarcinoma, AJCC 8th Edition  - Clinical stage from 11/17/2022: Stage III (cT3, cN0, cM0)       12/8/2022 Initial Diagnosis    Esophageal adenocarcinoma     12/21/2022 - 12/21/2022 Chemotherapy    Treatment Summary   Plan Name: OP ESOPHAGEAL PACLITAXEL CARBOPLATIN WEEKLY  Treatment Goal: Curative  Status: Inactive  Start Date:   End Date:   Provider: Miki Medrano MD  Chemotherapy: CARBOplatin (PARAPLATIN) in sodium chloride 0.9% 250 mL chemo infusion, , Intravenous, Clinic/HOD 1 time, 0 of 1 cycle  PACLitaxeL (TAXOL) 50 mg/m2 = 120 mg in sodium chloride 0.9% 250 mL chemo infusion, 50 mg/m2, Intravenous, Clinic/HOD 1 time, 0 of 1 cycle       12/29/2022 -  Chemotherapy    Treatment Summary   Plan Name: [facility] [id number] ARM B CARBOPLATIN PACLITAXEL NIVOLUMAB  Treatment Goal: Curative  Status: Active  Start Date: 12/29/2022  End Date: 1/26/2023 (Planned)  Provider: Miki Medrano MD  Chemotherapy: CARBOplatin (PARAPLATIN) 215 mg in sodium chloride 0.9% 306.5 mL chemo infusion, 215 mg, Intravenous, Clinic/HOD 1 time, 1 of 5 cycles  Administration: 215 mg (12/29/2022)  PACLitaxeL (TAXOL) 50 mg/m2 = 120 mg in sodium chloride 0.9% 250 mL chemo infusion, 50 mg/m2 = 120 mg, Intravenous, Clinic/HOD 1 time, 1 of 5 cycles  Administration: 120 mg (12/29/2022)            Interim History:   67 y.o. male with esophageal adenocarcinoma who presents for follow-up prior to his second dose of carboplatin + paclitaxel as part of  neoadjuvant chemoradiation. He endorses very mild fatigue, no change in appetite or swallowing.  He has some itchiness on his backside without diffuse rash.  Has one day with 5 bowel movements, typically has 2 per day.  Denies any other new concerns.     Presents alone. ECOG 0.     ROS:   Review of Systems   Constitutional:  Negative for appetite change, chills, fatigue, fever and unexpected weight change.   HENT:  Positive for trouble swallowing. Negative for mouth sores, nosebleeds and voice change.    Eyes:  Negative for visual disturbance.   Respiratory:  Negative for cough, shortness of breath and wheezing.    Cardiovascular:  Negative for chest pain, palpitations and leg swelling.   Gastrointestinal:  Negative for abdominal pain, blood in stool, constipation, diarrhea, nausea, reflux (on omeprazole) and fecal incontinence.   Genitourinary:  Negative for bladder incontinence, dysuria, frequency, hematuria and urgency.   Musculoskeletal:  Negative for arthralgias, back pain, gait problem, leg pain, myalgias and neck pain.   Integumentary:  Negative for rash and wound.   Neurological:  Negative for dizziness, facial asymmetry, weakness, light-headedness, headaches, coordination difficulties, memory loss and coordination difficulties.   Hematological:  Does not bruise/bleed easily.   Psychiatric/Behavioral:  Negative for agitation, behavioral problems, confusion and sleep disturbance. The patient is not nervous/anxious.        Past Medical History:   Past Medical History:   Diagnosis Date    Anticoagulant long-term use     Diabetes mellitus     Onset late 50s/early 60s    Hyperlipidemia     Hypertension     Onset late 50s/early 60s    Kidney stones     Sleep apnea     since 2006        Past Surgical History:   Past Surgical History:   Procedure Laterality Date    CORONARY ANGIOGRAPHY N/A 9/30/2020    Procedure: ANGIOGRAM, CORONARY ARTERY;  Surgeon: John West MD;  Location: [facility] CATH LAB;  Service:  Cardiology;  Laterality: N/A;    CYSTOSCOPY N/A 2/17/2022    Procedure: CYSTOSCOPY;  Surgeon: Lukasz Hughes MD;  Location: [facility] OR 1ST FLR;  Service: Urology;  Laterality: N/A;    CYSTOSCOPY W/ URETERAL STENT PLACEMENT N/A 2/25/2022    Procedure: CYSTOSCOPY, WITH URETERAL STENT INSERTION;  Surgeon: Lukasz Hughes MD;  [facility];  Service: Urology;  Laterality: N/A;    ENDOSCOPIC ULTRASOUND OF UPPER GASTROINTESTINAL TRACT N/A 12/7/2022    Procedure: ULTRASOUND, UPPER GI TRACT, ENDOSCOPIC;  Surgeon: Darian Main MD;  Location: [facility] ENDO;  Service: Endoscopy;  Laterality: N/A;  Approval to hold Xarelto rec'd from Dr. Nick (see t/e 12/5/22)-DS    ESOPHAGOGASTRODUODENOSCOPY N/A 11/17/2022    Procedure: EGD (ESOPHAGOGASTRODUODENOSCOPY);  Surgeon: Brody Gonzales MD;  Location: [facility] ENDO (2ND FLR);  Service: Endoscopy;  Laterality: N/A;  inst via email-ok to hold Xarelto x 2 days-MS    LASER LITHOTRIPSY Left 2/25/2022    Procedure: LITHOTRIPSY, USING LASER;  Surgeon: Lukasz Hughes MD;  [facility];  Service: Urology;  Laterality: Left;    LEFT HEART CATHETERIZATION Left 9/30/2020    Procedure: Left heart cath;  Surgeon: John West MD;  Location: [facility] CATH LAB;  Service: Cardiology;  Laterality: Left;    LITHOTRIPSY      PARATHYROIDECTOMY  1/1/2-107    PYELOSCOPY Left 2/25/2022    Procedure: PYELOSCOPY;  Surgeon: Lukasz Hughes MD;  [facility];  Service: Urology;  Laterality: Left;    TRANSESOPHAGEAL ECHOCARDIOGRAPHY N/A 4/7/2022    Procedure: ECHOCARDIOGRAM, TRANSESOPHAGEAL;  Surgeon: [facility] Diagnostic Provider;  Location: [facility] EP LAB;  Service: Cardiology;  Laterality: N/A;    TREATMENT OF CARDIAC ARRHYTHMIA N/A 4/7/2022    Procedure: Cardioversion or Defibrillation;  Surgeon: Iris Fisher NP;  Location: [facility] EP LAB;  Service: Cardiology;  Laterality: N/A;  afib, dccv, artie, anes, EH, 3prep    URETEROSCOPIC REMOVAL OF URETERIC CALCULUS Left  2022    Procedure: REMOVAL, CALCULUS, URETER, URETEROSCOPIC;  Surgeon: Lukasz Hughes MD;  Location: [facility] OR [address] [place] [address];  Service: Urology;  Laterality: Left;    URETEROSCOPY Left 2022    Procedure: URETEROSCOPY;  Surgeon: Lukasz Hughes MD;  [facility];  Service: Urology;  Laterality: Left;        Family History:   Family History   Problem Relation Age of Onset    Arthritis Mother     Heart disease Father 70        CABG    Arthritis Father     Diabetes Father     Kidney disease Father         had one kidney removed in early thirties    Arthritis Sister     Arthritis Sister     Hypertension Sister     Colon cancer Brother 32    Arthritis Brother     Alcohol abuse Paternal Aunt     Cancer Maternal Grandfather         throat cancer    Diabetes Paternal Grandmother     Colon polyps Paternal Grandfather     Colon cancer Paternal Grandfather     Cancer Paternal Grandfather         colon cancer at age 62        Social History:   Social History     Tobacco Use    Smoking status: Former     Packs/day: 1.00     Years: 7.00     Pack years: 7.00     Types: Cigarettes, Cigars     Start date: 1972     Quit date:      Years since quittin.6     Passive exposure: Never    Smokeless tobacco: Never    Tobacco comments:     smoking was off and on.  cumulative 7 years.  never more than three years in one stretch or more lj   Substance Use Topics    Alcohol use: Yes     Alcohol/week: 3.0 standard drinks     Types: 2 Cans of beer, 1 Shots of liquor per week     Comment: don't drink regularly.  6 to 7 monthly      I have reviewed and updated the patient's past medical, surgical, family and social histories.    Allergies:   Review of patient's allergies indicates:  No Known Allergies     Medications:   Current Outpatient Medications   Medication Sig Dispense Refill    ACCU-CHEK SOFTCLIX LANCETS Misc USE EVERY  each 6    allopurinoL (ZYLOPRIM) 100 MG tablet TAKE 1 TABLET BY MOUTH EVERY DAY  30 tablet 10    blood sugar diagnostic (ACCU-CHEK ANTONELLA PLUS TEST STRP) Strp 1 strip by Misc.(Non-Drug; Combo Route) route 3 (three) times daily before meals. 100 strip 11    blood-glucose meter kit Checks blood sugars 1x/daily. 1 each 12    glimepiride (AMARYL) 2 MG tablet TAKE 2 TABLETS BY MOUTH EVERY MORNING 180 tablet 2    lisinopriL-hydrochlorothiazide (PRINZIDE,ZESTORETIC) 20-25 mg Tab TAKE 1 TABLET BY MOUTH EVERY DAY 90 tablet 3    metFORMIN (GLUCOPHAGE) 500 MG tablet TAKE 2 TABLETS BY MOUTH EVERY MORNING AND 2 TABLETS WITH DINNER 120 tablet 10    metoprolol succinate (TOPROL-XL) 25 MG 24 hr tablet Take 1 tablet (25 mg total) by mouth once daily. 30 tablet 11    nitroGLYCERIN (NITROSTAT) 0.4 MG SL tablet Place 1 tablet (0.4 mg total) under the tongue every 5 (five) minutes as needed for Chest pain. If you need a third tablet, call 911 (Patient not taking: Reported on 12/14/2022) 60 tablet 12    omeprazole (PRILOSEC) 40 MG capsule Take 1 capsule (40 mg total) by mouth once daily. 90 capsule 3    potassium citrate (UROCIT-K) 10 mEq (1,080 mg) TbSR Take 1 tablet (10 mEq total) by mouth 3 (three) times daily with meals. 90 tablet 9    rivaroxaban (XARELTO) 20 mg Tab Take 1 tablet (20 mg total) by mouth daily with dinner or evening meal. 30 tablet 11    rosuvastatin (CRESTOR) 20 MG tablet TAKE 1 TABLET(20 MG) BY MOUTH EVERY DAY 30 tablet 11    tamsulosin (FLOMAX) 0.4 mg Cap Take 1 capsule (0.4 mg total) by mouth once daily. 30 capsule 11    testosterone (ANDROGEL) 20.25 mg/1.25 gram (1.62 %) GlPm Apply 4 pumps to shoulders daily 2 each 5     No current facility-administered medications for this visit.        Physical Exam:   /62 (BP Location: Left arm, Patient Position: Sitting, BP Method: Large (Automatic))   Pulse 77   Temp 99 °F (37.2 °C) (Oral)   Resp 18   Ht 6' (1.829 m)   Wt 117.8 kg (259 lb 11.2 oz)   SpO2 96%   BMI 35.22 kg/m²      ECOG Performance status: 0    Physical Exam  Vitals reviewed.    Constitutional:       General: He is not in acute distress.     Appearance: Normal appearance. He is not ill-appearing, toxic-appearing or diaphoretic.   HENT:      Head: Normocephalic and atraumatic.      Right Ear: External ear normal.      Left Ear: External ear normal.   Eyes:      General: No scleral icterus.     Extraocular Movements: Extraocular movements intact.      Conjunctiva/sclera: Conjunctivae normal.      Pupils: Pupils are equal, round, and reactive to light.   Cardiovascular:      Rate and Rhythm: Normal rate and regular rhythm.      Heart sounds: Normal heart sounds. No murmur heard.  Pulmonary:      Effort: Pulmonary effort is normal. No respiratory distress.      Breath sounds: Normal breath sounds. No wheezing or rales.   Abdominal:      General: Bowel sounds are normal. There is no distension.      Palpations: Abdomen is soft.      Tenderness: There is no abdominal tenderness.   Musculoskeletal:         General: No swelling.      Cervical back: Normal range of motion.   Lymphadenopathy:      Cervical: No cervical adenopathy.   Skin:     Coloration: Skin is not jaundiced.      Findings: No bruising, erythema or rash.   Neurological:      General: No focal deficit present.      Mental Status: He is alert and oriented to person, place, and time. Mental status is at baseline.      Cranial Nerves: No cranial nerve deficit.      Motor: No weakness.      Gait: Gait normal.   Psychiatric:         Mood and Affect: Mood normal.         Behavior: Behavior normal.         Thought Content: Thought content normal.         Judgment: Judgment normal.       Labs:   Recent Results (from the past 48 hour(s))   Comprehensive Metabolic Panel    Collection Time: 01/04/23  8:14 AM   Result Value Ref Range    Sodium 138 136 - 145 mmol/L    Potassium 4.8 3.5 - 5.1 mmol/L    Chloride 102 95 - 110 mmol/L    CO2 28 23 - 29 mmol/L    Glucose 172 (H) 70 - 110 mg/dL    BUN 15 8 - 23 mg/dL    Creatinine 1.3 0.5 - 1.4 mg/dL     Calcium 9.5 8.7 - 10.5 mg/dL    Total Protein 7.0 6.0 - 8.4 g/dL    Albumin 3.9 3.5 - 5.2 g/dL    Total Bilirubin 0.6 0.1 - 1.0 mg/dL    Alkaline Phosphatase 53 (L) 55 - 135 U/L    AST 19 10 - 40 U/L    ALT 14 10 - 44 U/L    Anion Gap 8 8 - 16 mmol/L    eGFR 59.8 (A) >60 mL/min/1.73 m^2   CBC W/ AUTO DIFFERENTIAL    Collection Time: 23  8:14 AM   Result Value Ref Range    WBC 3.94 3.90 - 12.70 K/uL    RBC 4.91 4.60 - 6.20 M/uL    Hemoglobin 14.2 14.0 - 18.0 g/dL    Hematocrit 45.1 40.0 - 54.0 %    MCV 92 82 - 98 fL    MCH 28.9 27.0 - 31.0 pg    MCHC 31.5 (L) 32.0 - 36.0 g/dL    RDW 14.1 11.5 - 14.5 %    Platelets 143 (L) 150 - 450 K/uL    MPV 10.4 9.2 - 12.9 fL    Immature Granulocytes 0.8 (H) 0.0 - 0.5 %    Gran # (ANC) 2.6 1.8 - 7.7 K/uL    Immature Grans (Abs) 0.03 0.00 - 0.04 K/uL    Lymph # 0.9 (L) 1.0 - 4.8 K/uL    Mono # 0.2 (L) 0.3 - 1.0 K/uL    Eos # 0.2 0.0 - 0.5 K/uL    Baso # 0.03 0.00 - 0.20 K/uL    nRBC 0 0 /100 WBC    Gran % 65.9 38.0 - 73.0 %    Lymph % 21.6 18.0 - 48.0 %    Mono % 5.1 4.0 - 15.0 %    Eosinophil % 5.8 0.0 - 8.0 %    Basophil % 0.8 0.0 - 1.9 %    Differential Method Automated    Magnesium    Collection Time: 23  8:14 AM   Result Value Ref Range    Magnesium 1.7 1.6 - 2.6 mg/dL   PHOSPHORUS    Collection Time: 23  8:14 AM   Result Value Ref Range    Phosphorus 2.3 (L) 2.7 - 4.5 mg/dL        I have reviewed the pertinent labs from 22 which are notable for mild thrombocytopenia.  Cr 1.3, normal LFTs.    Imagin2022 - EUS  Impression:             - Esophageal mucosal changes consistent with Paredes's esophagus.   - Partially obstructing, malignant esophageal tumor was found in the lower third of the esophagus.   - Normal stomach.   - Normal examined duodenum.   - A mass was found in the lower third of the esophagus. A tissue diagnosis was obtained prior to this exam. This is of adenocarcinoma. This was staged T3 (based on invasion into) N0 Mx by  endosonographic criteria.   - No specimens collected.     12/8/2022 - PET CT   Impression:  - Distal esophageal wall thickening and hypermetabolic activity in keeping with known esophageal cancer.  - No definite evidence of metastatic disease.  Subcentimeter gastrohepatic and perigastric lymph nodes without appreciable uptake.    I have personally reviewed the imaging which is notable for mass in distal esophagus without evidence of distant metastatic disease.    Path:     Final Pathologic Diagnosis  Abnormal   Gastroesophageal junction, biopsy:   - Adenocarcinoma.   - Background intestinal metaplasia with low and high-grade dysplasia.   - Focally suspicious for lymphovascular invasion.   - MMR pending, results will be issued in addendum.   - See comment.   COMMENT:  The biopsy shows adenocarcinoma with single cell infiltration of   the lamina propria highlighted on cytokeratin stain arising in a background   of intestinal metaplasia with low and high-grade dysplasia.  There is an area   suspicious for lymphovascular invasion on routine H&E sections however this   area is not confirmed on levels or immunostain for CD 34.            Assessment:       1. Esophageal adenocarcinoma    2. Dysphagia, unspecified type    3. Type 2 diabetes mellitus with stage 3 chronic kidney disease, without long-term current use of insulin, unspecified whether stage 3a or 3b CKD    4. CKD stage 3 due to type 2 diabetes mellitus    5. Hypertension associated with diabetes    6. Hyperlipidemia associated with type 2 diabetes mellitus    7. Chemotherapy-induced neuropathy    8. HFrEF (heart failure with reduced ejection fraction)    9. Paroxysmal atrial fibrillation    10. Thrombocytopenia            Plan:     # Esophageal adenocarcinoma   Mr. Person is a pleasant 67 year old male who presents to our clinic for management of recently diagnosed esophageal cancer. He initially presented with dysphagia, and further workup confirmed a stage II  adenocarcinoma. Tumor staged T3N0Mx by endosonographic criteria, JEVON. CT CAP on 12/14/22 confirmed no evidence of metastatic disease.    We had an in-depth conversation regarding his diagnosis and treatment options. We recommend perioperative chemo/radiation per the CROSS trial. Discussed chemoradiation for ~5 weeks with 5 doses of weekly carboplatin and taxol administered. Plan to obtain restaging scans 4-5 weeks after radiation completed.     He also appeared to be a candidate for a cooperative group trial assessing perioperative immunotherapy treatment. He consented for this study - ECOG-ACRIN [id number]: A Phase II/III Study of Dilcia-operative Nivolumab and Ipilimumab in Patients with Locoregional Esophageal and Gastroesophageal Junction Adenocarcinoma    Met with Dr. Santana Brown who agreed he was a surgical candidate.  Met with Dr. Javier Moulton who will be treating him with radiation.    Dysphagia stable at this time with adoption of coping strategies.  Began cycle 1 carboplatin/paclitaxel/nivolumab on trial on 12/29/22.  Presents for cycle 2 today.  Will receive carboplatin + paclitaxel today - 1/5/22.  Continue XRT with Dr. Moulton.  Labs reviewed.  Mild thrombocytopenia.  Orders signed after timeout with [facility].  RTC in one week.    # HTN, HLD, DM, CKD  Following with PCP Dr. Wilson and nephrologist Dr. Oconnell.   BP and glucose controlled in clinic today.   Creatinine 1.3 on most recent labs.   Continue medical management.   Monitor on treatment.     # CAD, A fib, CHF  Following with cardiologist Dr. Nick.   Currently asymptomatic.   On Xarelto.   Continue medical management.     Follow up: next week for continued chemoRT.    Miki Medrano MD  Hematology/Oncology  [facility]      Route Chart for Scheduling    Med Onc Chart Routing      Follow up with physician . Per hans Dan   Follow up with SAMUEL    Infusion scheduling note    Injection scheduling note    Labs     Imaging    Pharmacy appointment    Other referrals        Treatment Plan Information   [facility] [id number] ARM B CARBOPLATIN PACLITAXEL NIVOLUMAB   Miki Medrano MD   Upcoming Treatment Dates - [facility] [id number] ARM B CARBOPLATIN PACLITAXEL NIVOLUMAB    1/5/2023       Pre-Medications       palonosetron (ALOXI) 0.25 mg with Dexamethasone (DECADRON) 12 mg in NS 50 mL IVPB       famotidine (PF) injection 20 mg       diphenhydrAMINE (BENADRYL) 50 mg in sodium chloride 0.9% 50 mL IVPB       Chemotherapy       PACLitaxeL (TAXOL) 50 mg/m2 = 120 mg in sodium chloride 0.9% 250 mL chemo infusion       CARBOplatin (PARAPLATIN) 230 mg in sodium chloride 0.9% 273 mL chemo infusion  1/12/2023       Pre-Medications       palonosetron (ALOXI) 0.25 mg with Dexamethasone (DECADRON) 12 mg in NS 50 mL IVPB       diphenhydrAMINE (BENADRYL) 50 mg in sodium chloride 0.9% 50 mL IVPB       FAMOTIDINE (PF) 20 MG/2 ML IV SOLN       Chemotherapy       INV nivolumab 240 mg in INV sodium chloride 0.9 % 100 mL chemo infusion       PACLitaxeL (TAXOL) 50 mg/m2 = 120 mg in sodium chloride 0.9% 250 mL chemo infusion       CARBOplatin (PARAPLATIN) 215 mg in sodium chloride 0.9% 271.5 mL chemo infusion  1/19/2023       Pre-Medications       palonosetron (ALOXI) 0.25 mg with Dexamethasone (DECADRON) 12 mg in NS 50 mL IVPB       FAMOTIDINE (PF) 20 MG/2 ML IV SOLN       diphenhydrAMINE (BENADRYL) 50 mg in sodium chloride 0.9% 50 mL IVPB       Chemotherapy       PACLitaxeL (TAXOL) 50 mg/m2 = 120 mg in sodium chloride 0.9% 250 mL chemo infusion       CARBOplatin (PARAPLATIN) 215 mg in sodium chloride 0.9% 271.5 mL chemo infusion  1/26/2023       Pre-Medications       palonosetron (ALOXI) 0.25 mg with Dexamethasone (DECADRON) 12 mg in NS 50 mL IVPB       FAMOTIDINE (PF) 20 MG/2 ML IV SOLN       diphenhydrAMINE (BENADRYL) 50 mg in sodium chloride 0.9% 50 mL IVPB       Chemotherapy       PACLitaxeL (TAXOL) 50 mg/m2 = 120 mg in sodium chloride 0.9% 250 mL chemo  infusion       CARBOplatin (PARAPLATIN) 215 mg in sodium chloride 0.9% 271.5 mL chemo infusion

## 2023-01-05 NOTE — PLAN OF CARE
1340 Patient completed C2D1 Taxol/ Carboplatin. Vitals stable throughout infusion. Taxol titrated per policy. Patient educated to notify nurse during infusion for signs/symptoms of a reaction (back pain, itching, flushed feeling, scratchiness in throat). After taxol infusion completed, patient stated that at one point in the taxol infusion he experienced a mild pain in his back and slight flushed feeling. Patient stated he feel back asleep and when he awoke when infusion was complete he felt no symptoms. Provider and research nurse notified. No intervention needed per provider. Patient verbalized he will report symptoms during next infusion if they occur. Port with brisk blood return and flushed without resistance before, during and after infusion, heparin locked and needle removed at discharge. Patient aware of his next appointment date/time. To contact provider with questions or concerns. D/C ambulatory and stable.

## 2023-01-05 NOTE — PROGRESS NOTES
": URIEL Manriquez MD  Treating Investigators: NIRAV Medrano MD  Consulting Surgical Oncologist: SARAH Brown M.D. 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 Patient verbalized his consent to continue on above-mentioned study. Denies any changes to ConMeds. Patient completed labs yesterday, and his VS, PE & ECOG (ECOG = 0, per Dr. Medrano) were collected today. Patient denies any changes to his swallowing. Dr. Medrano assessed all patient's labs, VS & PE findings, and AEs. This were either WNL or NCS, deeming patient eligible to proceed with treatment with Carboplatin, Taxol today.      Vitals - 1 value per visit 1/5/2023   SYSTOLIC 130   DIASTOLIC 62   Pulse 77   Temp 99   Resp 18   SPO2 96   Weight (lb) 259.7   Weight (kg) 117.8      Solicited AEs:    Anemia- Grade 0  Platelet count decreased- Grade 1  White blood cell count decreased- Grade 0  Neutrophil count decreased- Grade 0  Lymphocyte count decreased- Grade 1  Peripheral motor neuropathy- Grade 0- Patient denies  Peripheral sensory neuropathy- Grade 0- Patient denies  Creatinine increased- Grade 0  Hypothyroidism- Grade 0 at baseline and TSH not assessed this visit.  Diarrhea (patient baseline BM = 2 stools a day)- Grade 1- Patient states that he had 5 loose stools one day this week, however, this resolved on its own. Per Dr. Medrano, possibly related to carboplatin or paclitaxel, and unrelated to Nivolumab at this time.   Fatigue- Grade 1- Patient notes that he was slightly more tired than usual. Per Dr. Medrano, possibly related to carboplatin or paclitaxel, and unrelated " to Nivolumab at this time.   Nausea- Grade 1- Patient notes that he had some nausea over the last week that did not affect his appetite. Weight stable.  Cough- Grade 0- Patient denies  Pneumonitis- Grade 0- Patient denies respiratory symptoms and no further testing done this visit.   Hyperglycemia- Grade 1- Patient has a history of diabetes. No changes in regimen indicated.   Adrenal Insufficiency- Grade 0- Patient denies any symptoms and no further testing done this visit.   Rash-maculo-papular- Grade 0- No rash per Dr. Medrano  Pruritus- Grade 1- He has some itchiness on his backside without diffuse rash per Dr. Medrano. Unrelated to treatment at this time.   Erythema multiforme- Grade 0  Vomiting- Grade 0  Lipase increased- Not required per study calendar and therefore not assessed this visit.    Other AEs:    Hypophosphatemia- Grade 1- NCS and no intervention indicated.     Dose modification section 5.4 reviewed. No dose modifications indicated. Patient's baseline weight is 117.2 kg & baseline BSA is 2.44 m2 were used for dose calculations. Section 5.0 and 5.1.1.2 reviewed with Dr. Medrano. He would typically adjust carboplatin dose based on patient's most recent creatinine which was 1.3 mg/dl per yesterday's labs and keep the weight the same unless there is a 10% change. Taxol and Carboplatin to be administered per institutional guidelines.      Time Out w/ Dr. Medrano in exam room (use baseline weight 117.2 kg - BSA = 2.44 m2 and yesterday's creatinine of 1.3):  Paclitaxel: 50 mg/m2 = 122 mg (normalized = 120 mg)   Carboplatin: 2 AUC = 230.4 mg (normalized = 230 mg)  All doses calculated & verified as correct in Epic.     Infusion RN Vicente Torres was instructed on infusion guidelines. Hypersensitivity reaction guidelines reviewed. RN expressed their understanding of all instructions. Please see Infusion RN note for further information.     Patient is scheduled for RT today. He did not receive RT treatment  Monday due to the New Year Holiday.      Patient was encouraged to contact [facility] (my contact info was given) or Dr. Medrano's team with any questions or concerns & was reminded of clinic's 24/7 emergency contact number. Patient verbalized understanding & denies any additional questions at this time. Patient will RTC in one week for next study visit. Appointments are already scheduled.     Complete Baseline Medical History, Adverse Events, and Concomitant Medications are located in patient's shadow chart

## 2023-01-06 ENCOUNTER — DOCUMENTATION ONLY (OUTPATIENT)
Dept: RADIATION ONCOLOGY | Facility: CLINIC | Age: 68
End: 2023-01-06
Payer: MEDICARE

## 2023-01-06 ENCOUNTER — CLINICAL SUPPORT (OUTPATIENT)
Dept: HEMATOLOGY/ONCOLOGY | Facility: CLINIC | Age: 68
End: 2023-01-06
Payer: MEDICARE

## 2023-01-06 DIAGNOSIS — G89.29 CHRONIC LOW BACK PAIN: ICD-10-CM

## 2023-01-06 DIAGNOSIS — T45.1X5A CHEMOTHERAPY-INDUCED NEUROPATHY: ICD-10-CM

## 2023-01-06 DIAGNOSIS — M54.50 CHRONIC BILATERAL LOW BACK PAIN WITHOUT SCIATICA: Primary | ICD-10-CM

## 2023-01-06 DIAGNOSIS — G89.29 CHRONIC BILATERAL LOW BACK PAIN WITHOUT SCIATICA: Primary | ICD-10-CM

## 2023-01-06 DIAGNOSIS — G62.0 CHEMOTHERAPY-INDUCED NEUROPATHY: ICD-10-CM

## 2023-01-06 DIAGNOSIS — R11.2 CINV (CHEMOTHERAPY-INDUCED NAUSEA AND VOMITING): ICD-10-CM

## 2023-01-06 DIAGNOSIS — T45.1X5A CINV (CHEMOTHERAPY-INDUCED NAUSEA AND VOMITING): ICD-10-CM

## 2023-01-06 DIAGNOSIS — M54.50 CHRONIC LOW BACK PAIN: ICD-10-CM

## 2023-01-06 PROCEDURE — 97813 ACUP 1/> W/ESTIM 1ST 15 MIN: CPT | Mod: PBBFAC | Performed by: ACUPUNCTURIST

## 2023-01-06 PROCEDURE — 77014 PR  CT GUIDANCE PLACEMENT RAD THERAPY FIELDS: CPT | Mod: 26,,, | Performed by: STUDENT IN AN ORGANIZED HEALTH CARE EDUCATION/TRAINING PROGRAM

## 2023-01-06 PROCEDURE — 77386 HC IMRT, COMPLEX: CPT | Performed by: STUDENT IN AN ORGANIZED HEALTH CARE EDUCATION/TRAINING PROGRAM

## 2023-01-06 PROCEDURE — 97813 ACUP 1/> W/ESTIM 1ST 15 MIN: CPT | Mod: S$PBB,,, | Performed by: ACUPUNCTURIST

## 2023-01-06 PROCEDURE — 97814 ACUP 1/> W/ESTIM EA ADDL 15: CPT | Mod: PBBFAC | Performed by: ACUPUNCTURIST

## 2023-01-06 PROCEDURE — 97814 ACUP 1/> W/ESTIM EA ADDL 15: CPT | Mod: S$PBB,,, | Performed by: ACUPUNCTURIST

## 2023-01-06 PROCEDURE — 77014 HC CT GUIDANCE RADIATION THERAPY FLDS PLACEMENT: CPT | Mod: TC | Performed by: STUDENT IN AN ORGANIZED HEALTH CARE EDUCATION/TRAINING PROGRAM

## 2023-01-06 PROCEDURE — 97814 PR ACUPUNCT W/ ELEC STIMUL ADDL 15M: ICD-10-PCS | Mod: S$PBB,,, | Performed by: ACUPUNCTURIST

## 2023-01-06 PROCEDURE — 97813 PR ACUPUNCT W/ ELEC STIMUL 15 MIN: ICD-10-PCS | Mod: S$PBB,,, | Performed by: ACUPUNCTURIST

## 2023-01-06 PROCEDURE — 77014 PR  CT GUIDANCE PLACEMENT RAD THERAPY FIELDS: ICD-10-PCS | Mod: 26,,, | Performed by: STUDENT IN AN ORGANIZED HEALTH CARE EDUCATION/TRAINING PROGRAM

## 2023-01-06 NOTE — PROGRESS NOTES
Subjective:       Patient ID: Lukasz Person is a 67 y.o. male.    Chief Complaint: Pain (cLBP, CIPN), Nausea (CINV), and Results (fatigue)    New patient establishing care for cLBP, joint pain. Patient states he is also getting fatigue when doing chemo and radiation. No issues with nausea currently or CIPN due to chemo but has some lingering tingling from diabetic neuropathy.     Review of Systems   Constitutional:  Positive for fatigue.   Gastrointestinal:  Positive for nausea and vomiting.   Musculoskeletal:  Positive for arthralgias.   Neurological:  Positive for numbness.       Objective:      Physical Exam    Assessment:       Problem List Items Addressed This Visit    None  Visit Diagnoses       Chronic low back pain        Chemotherapy-induced neuropathy        CINV (chemotherapy-induced nausea and vomiting)                  Plan:       1x a week         Pre-Symptom Score: NA  Post-Symptom Score: NA     Pain (cLBP, CIPN), Nausea (CINV), and Results (fatigue)       Encounter Diagnoses   Name Primary?    Chronic low back pain     Chemotherapy-induced neuropathy     CINV (chemotherapy-induced nausea and vomiting)        Acupuncture points used:  4 HORNER, BA DONNA, Du20, Gb34, Li11, Pc6, DUMOTN MEN, Sp10, Sp6, Sp9, SSC, St36, St40, and YIN CYR    NO STIM USED      NEEDLES IN: 24  NEEDLES OUT: 24    NEEDLES W/O STIM  AT: 11:15 AM  NEEDLES W/O STIM REMOVED AT: 11:45 AM

## 2023-01-06 NOTE — PLAN OF CARE
Day 6 of outpatient radiation to the esophagus. Tolerating treatment well. Weight stable at 263.5# today.

## 2023-01-09 ENCOUNTER — PATIENT MESSAGE (OUTPATIENT)
Dept: HEMATOLOGY/ONCOLOGY | Facility: CLINIC | Age: 68
End: 2023-01-09

## 2023-01-09 ENCOUNTER — TELEPHONE (OUTPATIENT)
Dept: INTERNAL MEDICINE | Facility: CLINIC | Age: 68
End: 2023-01-09
Payer: MEDICARE

## 2023-01-09 ENCOUNTER — OFFICE VISIT (OUTPATIENT)
Dept: HEMATOLOGY/ONCOLOGY | Facility: CLINIC | Age: 68
End: 2023-01-09
Payer: MEDICARE

## 2023-01-09 VITALS — BODY MASS INDEX: 35.23 KG/M2 | HEIGHT: 72 IN | WEIGHT: 260.13 LBS

## 2023-01-09 DIAGNOSIS — F43.29 STRESS AND ADJUSTMENT REACTION: ICD-10-CM

## 2023-01-09 DIAGNOSIS — R53.81 PHYSICAL DECONDITIONING: Primary | ICD-10-CM

## 2023-01-09 DIAGNOSIS — C15.9 ESOPHAGEAL ADENOCARCINOMA: ICD-10-CM

## 2023-01-09 PROCEDURE — 77386 HC IMRT, COMPLEX: CPT | Mod: Q1 | Performed by: STUDENT IN AN ORGANIZED HEALTH CARE EDUCATION/TRAINING PROGRAM

## 2023-01-09 PROCEDURE — 99213 PR OFFICE/OUTPT VISIT, EST, LEVL III, 20-29 MIN: ICD-10-PCS | Mod: S$PBB,ICN,, | Performed by: PHYSICIAN ASSISTANT

## 2023-01-09 PROCEDURE — 77014 PR  CT GUIDANCE PLACEMENT RAD THERAPY FIELDS: CPT | Mod: 26,,, | Performed by: STUDENT IN AN ORGANIZED HEALTH CARE EDUCATION/TRAINING PROGRAM

## 2023-01-09 PROCEDURE — 77014 HC CT GUIDANCE RADIATION THERAPY FLDS PLACEMENT: CPT | Mod: TC,Q1 | Performed by: STUDENT IN AN ORGANIZED HEALTH CARE EDUCATION/TRAINING PROGRAM

## 2023-01-09 PROCEDURE — 99214 OFFICE O/P EST MOD 30 MIN: CPT | Mod: PBBFAC,25 | Performed by: PHYSICIAN ASSISTANT

## 2023-01-09 PROCEDURE — 99999 PR PBB SHADOW E&M-EST. PATIENT-LVL IV: ICD-10-PCS | Mod: PBBFAC,,, | Performed by: PHYSICIAN ASSISTANT

## 2023-01-09 PROCEDURE — 99213 OFFICE O/P EST LOW 20 MIN: CPT | Mod: S$PBB,ICN,, | Performed by: PHYSICIAN ASSISTANT

## 2023-01-09 PROCEDURE — 99999 PR PBB SHADOW E&M-EST. PATIENT-LVL IV: CPT | Mod: PBBFAC,,, | Performed by: PHYSICIAN ASSISTANT

## 2023-01-09 PROCEDURE — 77014 PR  CT GUIDANCE PLACEMENT RAD THERAPY FIELDS: ICD-10-PCS | Mod: 26,,, | Performed by: STUDENT IN AN ORGANIZED HEALTH CARE EDUCATION/TRAINING PROGRAM

## 2023-01-09 NOTE — TELEPHONE ENCOUNTER
Let the pt know Maria Esther is out on medical leave and his primary care provider will be helping out until she returns.

## 2023-01-09 NOTE — PROGRESS NOTES
"Integrative Health and Medicine Initial Visit    This 67 y.o. male seeks an integrative approach to discuss what he can do to improve his long-term survival from cancer and to address side effects related to cancer treatment.     HPI  He has a history of esophageal adenocarcinoma currently being treated with neoadjuvant chemoradiation. Tolerating chemotherapy well. Had one morning where he "had every possible symptom, including nausea," but otherwise not complaints. Some fatigue and decreased appetite but able to maintain weight.  Does have diabetic peripheral neuropathy. Has seen acupuncture for one treatment and tolerated well. Will continue to see weekly.  Denies anxiety or depressed mood. His wife has described him as "ornery." He is interested in meeting with psychology.   with 2 step children and 2 grandchildren.    Supplements: None    7 pillars Assessment    Sleep  Wakes 1-2x per week to urinate. Usually able to fall asleep quickly, but now finds his mind racing.  Does uses prayer/meditation to try to calm the mind.    Spirituality  Worked in [employer] for the last 30 years of his life.  Regular prayer/devotion.     Nutrition   Decreased appetite but continues to eat regular meals. Met with Nutrition who recommended Glycerna which he likes but having a hard time finding.  He is maintaining his weight.    Exercise  Walking 5 x a week for exercise and yard work.  Does feel like he has lost significant strength since retiring.    Past Medical History  Past Medical History:   Diagnosis Date    Anticoagulant long-term use     Diabetes mellitus     Onset late 50s/early 60s    Hyperlipidemia     Hypertension     Onset late 50s/early 60s    Kidney stones     Sleep apnea     since 2006        Past Surgical History   Past Surgical History:   Procedure Laterality Date    CORONARY ANGIOGRAPHY N/A 9/30/2020    Procedure: ANGIOGRAM, CORONARY ARTERY;  Surgeon: John West MD;  Location: [facility]" LAB;  Service: Cardiology;  Laterality: N/A;    CYSTOSCOPY N/A 2/17/2022    Procedure: CYSTOSCOPY;  Surgeon: Lukasz Hughes MD;  Location: [facility] OR [facility] F[facility];  Service: Urology;  Laterality: N/A;    CYSTOSCOPY W/ URETERAL STENT PLACEMENT N/A 2/25/2022    Procedure: CYSTOSCOPY, WITH URETERAL STENT INSERTION;  Surgeon: Lukasz Hughes MD;  [facility];  Service: Urology;  Laterality: N/A;    ENDOSCOPIC ULTRASOUND OF UPPER GASTROINTESTINAL TRACT N/A 12/7/2022    Procedure: ULTRASOUND, UPPER GI TRACT, ENDOSCOPIC;  Surgeon: Darian Main MD;  Location: [facility] ENDO;  Service: Endoscopy;  Laterality: N/A;  Approval to hold Xarelto rec'd from Dr. Nick (see t/e 12/5/22)-DS    ESOPHAGOGASTRODUODENOSCOPY N/A 11/17/2022    Procedure: EGD (ESOPHAGOGASTRODUODENOSCOPY);  Surgeon: Brody Gonzales MD;  Location: [facility] (2ND FLR);  Service: Endoscopy;  Laterality: N/A;  inst via email-ok to hold Xarelto x 2 days-MS    LASER LITHOTRIPSY Left 2/25/2022    Procedure: LITHOTRIPSY, USING LASER;  Surgeon: Lukasz Hughes MD;  [facility];  Service: Urology;  Laterality: Left;    LEFT HEART CATHETERIZATION Left 9/30/2020    Procedure: Left heart cath;  Surgeon: John West MD;  Location: [facility] CATH LAB;  Service: Cardiology;  Laterality: Left;    LITHOTRIPSY      PARATHYROIDECTOMY  1/1/2-107    PYELOSCOPY Left 2/25/2022    Procedure: PYELOSCOPY;  Surgeon: Lukasz Hughes MD;  [facility];  Service: Urology;  Laterality: Left;    TRANSESOPHAGEAL ECHOCARDIOGRAPHY N/A 4/7/2022    Procedure: ECHOCARDIOGRAM, TRANSESOPHAGEAL;  Surgeon: [facility] Diagnostic Provider;  Location: [facility] EP LAB;  Service: Cardiology;  Laterality: N/A;    TREATMENT OF CARDIAC ARRHYTHMIA N/A 4/7/2022    Procedure: Cardioversion or Defibrillation;  Surgeon: Iris Fisher NP;  Location: [facility] EP LAB;  Service: Cardiology;  Laterality: N/A;  afib, dccv, artie, anes, EH, 3prep    URETEROSCOPIC REMOVAL OF URETERIC  CALCULUS Left 2022    Procedure: REMOVAL, CALCULUS, URETER, URETEROSCOPIC;  Surgeon: Lukasz Hughes MD;  Location: [facility] OR [address] [place] [address];  Service: Urology;  Laterality: Left;    URETEROSCOPY Left 2022    Procedure: URETEROSCOPY;  Surgeon: Lukasz Hughes MD;  [facility];  Service: Urology;  Laterality: Left;        Family History   Family History   Problem Relation Age of Onset    Arthritis Mother     Heart disease Father 70        CABG    Arthritis Father     Diabetes Father     Kidney disease Father         had one kidney removed in early thirties    Arthritis Sister     Arthritis Sister     Hypertension Sister     Colon cancer Brother 32    Arthritis Brother     Alcohol abuse Paternal Aunt     Cancer Maternal Grandfather         throat cancer    Diabetes Paternal Grandmother     Colon polyps Paternal Grandfather     Colon cancer Paternal Grandfather     Cancer Paternal Grandfather         colon cancer at age 62        Social History  Social History     Socioeconomic History    Marital status:    Tobacco Use    Smoking status: Former     Packs/day: 1.00     Years: 7.00     Pack years: 7.00     Types: Cigarettes, Cigars     Start date: 1972     Quit date:      Years since quittin.6     Passive exposure: Never    Smokeless tobacco: Never    Tobacco comments:     smoking was off and on.  cumulative 7 years.  never more than three years in one stretch or more lj   Substance and Sexual Activity    Alcohol use: Yes     Alcohol/week: 3.0 standard drinks     Types: 2 Cans of beer, 1 Shots of liquor per week     Comment: don't drink regularly.  6 to 7 monthly    Drug use: Not Currently     Types: Marijuana    Sexual activity: Not Currently     Partners: Female     Birth control/protection: None     Comment:      Social Determinants of Health     Financial Resource Strain: Low Risk     Difficulty of Paying Living Expenses: Not very hard   Food Insecurity: No Food  Insecurity    Worried About Running Out of Food in the Last Year: Never true    Ran Out of Food in the Last Year: Never true   Transportation Needs: No Transportation Needs    Lack of Transportation (Medical): No    Lack of Transportation (Non-Medical): No   Physical Activity: Sufficiently Active    Days of Exercise per Week: 5 days    Minutes of Exercise per Session: 40 min   Stress: No Stress Concern Present    Feeling of Stress : Only a little   Social Connections: Unknown    Frequency of Communication with Friends and Family: Once a week    Frequency of Social Gatherings with Friends and Family: Once a week    Active Member of Clubs or Organizations: Yes    Attends Club or Organization Meetings: More than 4 times per year    Marital Status:    Housing Stability: Low Risk     Unable to Pay for Housing in the Last Year: No    Number of Places Lived in the Last Year: 1    Unstable Housing in the Last Year: No        Allergies  Review of patient's allergies indicates:  No Known Allergies     Current Medications:    Current Outpatient Medications:     ACCU-CHEK SOFTCLIX LANCETS Misc, USE EVERY DAY, Disp: 100 each, Rfl: 6    allopurinoL (ZYLOPRIM) 100 MG tablet, TAKE 1 TABLET BY MOUTH EVERY DAY, Disp: 30 tablet, Rfl: 10    blood sugar diagnostic (ACCU-CHEK ANTONELLA PLUS TEST STRP) Strp, 1 strip by Misc.(Non-Drug; Combo Route) route 3 (three) times daily before meals., Disp: 100 strip, Rfl: 11    blood-glucose meter kit, Checks blood sugars 1x/daily., Disp: 1 each, Rfl: 12    glimepiride (AMARYL) 2 MG tablet, TAKE 2 TABLETS BY MOUTH EVERY MORNING, Disp: 180 tablet, Rfl: 2    lisinopriL-hydrochlorothiazide (PRINZIDE,ZESTORETIC) 20-25 mg Tab, TAKE 1 TABLET BY MOUTH EVERY DAY, Disp: 90 tablet, Rfl: 3    metFORMIN (GLUCOPHAGE) 500 MG tablet, TAKE 2 TABLETS BY MOUTH EVERY MORNING AND 2 TABLETS WITH DINNER, Disp: 120 tablet, Rfl: 10    metoprolol succinate (TOPROL-XL) 25 MG 24 hr tablet, Take 1 tablet (25 mg total) by  mouth once daily., Disp: 30 tablet, Rfl: 11    nitroGLYCERIN (NITROSTAT) 0.4 MG SL tablet, Place 1 tablet (0.4 mg total) under the tongue every 5 (five) minutes as needed for Chest pain. If you need a third tablet, call 911, Disp: 60 tablet, Rfl: 12    omeprazole (PRILOSEC) 40 MG capsule, Take 1 capsule (40 mg total) by mouth once daily., Disp: 90 capsule, Rfl: 3    potassium citrate (UROCIT-K) 10 mEq (1,080 mg) TbSR, Take 1 tablet (10 mEq total) by mouth 3 (three) times daily with meals., Disp: 90 tablet, Rfl: 9    rivaroxaban (XARELTO) 20 mg Tab, Take 1 tablet (20 mg total) by mouth daily with dinner or evening meal., Disp: 30 tablet, Rfl: 11    rosuvastatin (CRESTOR) 20 MG tablet, TAKE 1 TABLET(20 MG) BY MOUTH EVERY DAY, Disp: 30 tablet, Rfl: 11    tamsulosin (FLOMAX) 0.4 mg Cap, Take 1 capsule (0.4 mg total) by mouth once daily., Disp: 30 capsule, Rfl: 11    testosterone (ANDROGEL) 20.25 mg/1.25 gram (1.62 %) GlPm, Apply 4 pumps to shoulders daily, Disp: 2 each, Rfl: 5     Review of Systems  Constitutional: no weight loss, weight gain, fever, fatigue  Eyes:  No vision changes, glasses/contacts  ENT/Mouth: No ulcers, sinus problems, ears ringing, headache  Cardiovascular: No inability to lie flat, chest pain, exercise intolerance, swelling, heart palpitations  Respiratory: No wheezing, coughing blood, shortness of breath, or cough  Gastrointestinal: No diarrhea, bloody stool, nausea/vomiting, constipation, gas, hemorrhoids  Genitourinary: No blood in urine, painful urination, urgency of urination, frequency of urination, incomplete emptying, incontinence, sexual problems.  Musculoskeletal: No muscle weakness  Skin/Breast: No rash, ulcers  Neurological: No passing out, seizures, numbness, headache  Endocrine: No diabetes, hypothyroid, hyperthyroid, hot flashes, hair loss, abnormal hair growth, acne  Psychiatric: No depression, crying  Hematologic: No bruises, bleeding, swollen lymph nodes, anemia.    Physical Exam    Vitals:    01/09/23 1131   Weight: 118 kg (260 lb 2.3 oz)   Height: 6' (1.829 m)   PainSc: 0-No pain     Body mass index is 35.28 kg/m².  Physical Exam - Deferred    ASSESSMENT:     Physical deconditioning  -     Ambulatory referral/consult to Physical/Occupational Therapy; Future; Expected date: 01/16/2023    Esophageal adenocarcinoma  -     Ambulatory referral/consult to Hematology/Oncology/Psychology; Future; Expected date: 01/16/2023    Stress and adjustment reaction  -     Ambulatory referral/consult to Hematology/Oncology/Psychology; Future; Expected date: 01/16/2023      PLAN:  Continue acupuncture weekly  Reviewed diet and encouraged lean proteins to help with healing.  Message to Carmen about buying Glycerna locally.  Reviewed benefits of exercise.  Referral to PT to help with deconditioning and increasing strength.  Continue to walk almost daily.  Reviewed sleep hygiene.  Referral to psychology- message to  sent.  Interested in meditation class, but not ready to schedule. He will let me know.  Reviewed resources available to him if needed in the future, including acupuncture, psychology, yoga classes, therapeutic yoga, meditation, physical therapy and nutritional counseling.   Follow up PRN.

## 2023-01-09 NOTE — TELEPHONE ENCOUNTER
----- Message from Ciarra Montana sent at 1/9/2023  1:20 PM CST -----  Please call the patient to reschedule the appointment.    Thank you

## 2023-01-10 PROCEDURE — 77386 HC IMRT, COMPLEX: CPT | Mod: Q1 | Performed by: STUDENT IN AN ORGANIZED HEALTH CARE EDUCATION/TRAINING PROGRAM

## 2023-01-10 PROCEDURE — 77014 PR  CT GUIDANCE PLACEMENT RAD THERAPY FIELDS: ICD-10-PCS | Mod: 26,,, | Performed by: STUDENT IN AN ORGANIZED HEALTH CARE EDUCATION/TRAINING PROGRAM

## 2023-01-10 PROCEDURE — 77014 HC CT GUIDANCE RADIATION THERAPY FLDS PLACEMENT: CPT | Mod: TC,Q1 | Performed by: STUDENT IN AN ORGANIZED HEALTH CARE EDUCATION/TRAINING PROGRAM

## 2023-01-10 PROCEDURE — 77014 PR  CT GUIDANCE PLACEMENT RAD THERAPY FIELDS: CPT | Mod: 26,,, | Performed by: STUDENT IN AN ORGANIZED HEALTH CARE EDUCATION/TRAINING PROGRAM

## 2023-01-11 ENCOUNTER — TELEPHONE (OUTPATIENT)
Dept: INFUSION THERAPY | Facility: HOSPITAL | Age: 68
End: 2023-01-11
Payer: MEDICARE

## 2023-01-11 PROCEDURE — 77014 HC CT GUIDANCE RADIATION THERAPY FLDS PLACEMENT: CPT | Mod: TC,Q1 | Performed by: STUDENT IN AN ORGANIZED HEALTH CARE EDUCATION/TRAINING PROGRAM

## 2023-01-11 PROCEDURE — 77386 HC IMRT, COMPLEX: CPT | Mod: Q1 | Performed by: STUDENT IN AN ORGANIZED HEALTH CARE EDUCATION/TRAINING PROGRAM

## 2023-01-11 PROCEDURE — 77014 PR  CT GUIDANCE PLACEMENT RAD THERAPY FIELDS: ICD-10-PCS | Mod: 26,,, | Performed by: STUDENT IN AN ORGANIZED HEALTH CARE EDUCATION/TRAINING PROGRAM

## 2023-01-11 PROCEDURE — 77014 PR  CT GUIDANCE PLACEMENT RAD THERAPY FIELDS: CPT | Mod: 26,,, | Performed by: STUDENT IN AN ORGANIZED HEALTH CARE EDUCATION/TRAINING PROGRAM

## 2023-01-12 PROCEDURE — 77014 PR  CT GUIDANCE PLACEMENT RAD THERAPY FIELDS: CPT | Mod: 26,,, | Performed by: STUDENT IN AN ORGANIZED HEALTH CARE EDUCATION/TRAINING PROGRAM

## 2023-01-12 PROCEDURE — 77386 HC IMRT, COMPLEX: CPT | Mod: Q1 | Performed by: STUDENT IN AN ORGANIZED HEALTH CARE EDUCATION/TRAINING PROGRAM

## 2023-01-12 PROCEDURE — 77014 PR  CT GUIDANCE PLACEMENT RAD THERAPY FIELDS: ICD-10-PCS | Mod: 26,,, | Performed by: STUDENT IN AN ORGANIZED HEALTH CARE EDUCATION/TRAINING PROGRAM

## 2023-01-12 PROCEDURE — 77014 HC CT GUIDANCE RADIATION THERAPY FLDS PLACEMENT: CPT | Mod: TC,Q1 | Performed by: STUDENT IN AN ORGANIZED HEALTH CARE EDUCATION/TRAINING PROGRAM

## 2023-01-13 ENCOUNTER — OFFICE VISIT (OUTPATIENT)
Dept: HEMATOLOGY/ONCOLOGY | Facility: CLINIC | Age: 68
End: 2023-01-13
Payer: MEDICARE

## 2023-01-13 ENCOUNTER — INFUSION (OUTPATIENT)
Dept: INFUSION THERAPY | Facility: HOSPITAL | Age: 68
End: 2023-01-13
Payer: MEDICARE

## 2023-01-13 ENCOUNTER — RESEARCH ENCOUNTER (OUTPATIENT)
Dept: RESEARCH | Facility: HOSPITAL | Age: 68
End: 2023-01-13
Payer: MEDICARE

## 2023-01-13 ENCOUNTER — DOCUMENTATION ONLY (OUTPATIENT)
Dept: RADIATION ONCOLOGY | Facility: CLINIC | Age: 68
End: 2023-01-13
Payer: MEDICARE

## 2023-01-13 VITALS
DIASTOLIC BLOOD PRESSURE: 72 MMHG | RESPIRATION RATE: 16 BRPM | BODY MASS INDEX: 34.78 KG/M2 | WEIGHT: 256.81 LBS | SYSTOLIC BLOOD PRESSURE: 121 MMHG | HEIGHT: 72 IN | OXYGEN SATURATION: 97 % | HEART RATE: 74 BPM | TEMPERATURE: 99 F

## 2023-01-13 VITALS
TEMPERATURE: 98 F | WEIGHT: 256.81 LBS | DIASTOLIC BLOOD PRESSURE: 71 MMHG | SYSTOLIC BLOOD PRESSURE: 137 MMHG | OXYGEN SATURATION: 96 % | HEART RATE: 67 BPM | RESPIRATION RATE: 1 BRPM | HEIGHT: 72 IN | BODY MASS INDEX: 34.78 KG/M2

## 2023-01-13 DIAGNOSIS — D69.6 THROMBOCYTOPENIA: ICD-10-CM

## 2023-01-13 DIAGNOSIS — E11.22 TYPE 2 DIABETES MELLITUS WITH STAGE 3 CHRONIC KIDNEY DISEASE, WITHOUT LONG-TERM CURRENT USE OF INSULIN, UNSPECIFIED WHETHER STAGE 3A OR 3B CKD: ICD-10-CM

## 2023-01-13 DIAGNOSIS — E78.5 HYPERLIPIDEMIA ASSOCIATED WITH TYPE 2 DIABETES MELLITUS: ICD-10-CM

## 2023-01-13 DIAGNOSIS — E11.69 HYPERLIPIDEMIA ASSOCIATED WITH TYPE 2 DIABETES MELLITUS: ICD-10-CM

## 2023-01-13 DIAGNOSIS — N18.30 CKD STAGE 3 DUE TO TYPE 2 DIABETES MELLITUS: ICD-10-CM

## 2023-01-13 DIAGNOSIS — I48.0 PAROXYSMAL ATRIAL FIBRILLATION: ICD-10-CM

## 2023-01-13 DIAGNOSIS — C15.9 ESOPHAGEAL ADENOCARCINOMA: Primary | ICD-10-CM

## 2023-01-13 DIAGNOSIS — N18.30 TYPE 2 DIABETES MELLITUS WITH STAGE 3 CHRONIC KIDNEY DISEASE, WITHOUT LONG-TERM CURRENT USE OF INSULIN, UNSPECIFIED WHETHER STAGE 3A OR 3B CKD: ICD-10-CM

## 2023-01-13 DIAGNOSIS — E11.59 HYPERTENSION ASSOCIATED WITH DIABETES: ICD-10-CM

## 2023-01-13 DIAGNOSIS — I50.20 HFREF (HEART FAILURE WITH REDUCED EJECTION FRACTION): ICD-10-CM

## 2023-01-13 DIAGNOSIS — I25.10 CORONARY ARTERY DISEASE INVOLVING NATIVE CORONARY ARTERY OF NATIVE HEART WITHOUT ANGINA PECTORIS: ICD-10-CM

## 2023-01-13 DIAGNOSIS — I15.2 HYPERTENSION ASSOCIATED WITH DIABETES: ICD-10-CM

## 2023-01-13 DIAGNOSIS — E11.22 CKD STAGE 3 DUE TO TYPE 2 DIABETES MELLITUS: ICD-10-CM

## 2023-01-13 PROCEDURE — 25000003 PHARM REV CODE 250: Performed by: INTERNAL MEDICINE

## 2023-01-13 PROCEDURE — 96413 CHEMO IV INFUSION 1 HR: CPT

## 2023-01-13 PROCEDURE — 77014 PR  CT GUIDANCE PLACEMENT RAD THERAPY FIELDS: CPT | Mod: 26,,, | Performed by: STUDENT IN AN ORGANIZED HEALTH CARE EDUCATION/TRAINING PROGRAM

## 2023-01-13 PROCEDURE — 96375 TX/PRO/DX INJ NEW DRUG ADDON: CPT

## 2023-01-13 PROCEDURE — A4216 STERILE WATER/SALINE, 10 ML: HCPCS | Mod: Q1 | Performed by: INTERNAL MEDICINE

## 2023-01-13 PROCEDURE — 77336 RADIATION PHYSICS CONSULT: CPT | Mod: Q1 | Performed by: STUDENT IN AN ORGANIZED HEALTH CARE EDUCATION/TRAINING PROGRAM

## 2023-01-13 PROCEDURE — 96417 CHEMO IV INFUS EACH ADDL SEQ: CPT | Mod: Q1

## 2023-01-13 PROCEDURE — 99215 PR OFFICE/OUTPT VISIT, EST, LEVL V, 40-54 MIN: ICD-10-PCS | Mod: S$PBB,,, | Performed by: INTERNAL MEDICINE

## 2023-01-13 PROCEDURE — 96367 TX/PROPH/DG ADDL SEQ IV INF: CPT

## 2023-01-13 PROCEDURE — 99215 OFFICE O/P EST HI 40 MIN: CPT | Mod: S$PBB,,, | Performed by: INTERNAL MEDICINE

## 2023-01-13 PROCEDURE — 99999 PR PBB SHADOW E&M-EST. PATIENT-LVL IV: ICD-10-PCS | Mod: PBBFAC,,, | Performed by: INTERNAL MEDICINE

## 2023-01-13 PROCEDURE — 77386 HC IMRT, COMPLEX: CPT | Performed by: STUDENT IN AN ORGANIZED HEALTH CARE EDUCATION/TRAINING PROGRAM

## 2023-01-13 PROCEDURE — 77014 HC CT GUIDANCE RADIATION THERAPY FLDS PLACEMENT: CPT | Mod: TC,Q1 | Performed by: STUDENT IN AN ORGANIZED HEALTH CARE EDUCATION/TRAINING PROGRAM

## 2023-01-13 PROCEDURE — 63600175 PHARM REV CODE 636 W HCPCS: Mod: Q1 | Performed by: INTERNAL MEDICINE

## 2023-01-13 PROCEDURE — 99214 OFFICE O/P EST MOD 30 MIN: CPT | Mod: PBBFAC | Performed by: INTERNAL MEDICINE

## 2023-01-13 PROCEDURE — 77014 PR  CT GUIDANCE PLACEMENT RAD THERAPY FIELDS: ICD-10-PCS | Mod: 26,,, | Performed by: STUDENT IN AN ORGANIZED HEALTH CARE EDUCATION/TRAINING PROGRAM

## 2023-01-13 PROCEDURE — 99999 PR PBB SHADOW E&M-EST. PATIENT-LVL IV: CPT | Mod: PBBFAC,,, | Performed by: INTERNAL MEDICINE

## 2023-01-13 RX ORDER — HEPARIN 100 UNIT/ML
500 SYRINGE INTRAVENOUS
Status: CANCELLED | OUTPATIENT
Start: 2023-01-13

## 2023-01-13 RX ORDER — EPINEPHRINE 0.3 MG/.3ML
0.3 INJECTION SUBCUTANEOUS ONCE AS NEEDED
Status: CANCELLED | OUTPATIENT
Start: 2023-01-13

## 2023-01-13 RX ORDER — SODIUM CHLORIDE 0.9 % (FLUSH) 0.9 %
10 SYRINGE (ML) INJECTION
Status: DISCONTINUED | OUTPATIENT
Start: 2023-01-13 | End: 2023-01-13 | Stop reason: HOSPADM

## 2023-01-13 RX ORDER — EPINEPHRINE 0.3 MG/.3ML
0.3 INJECTION SUBCUTANEOUS ONCE AS NEEDED
Status: DISCONTINUED | OUTPATIENT
Start: 2023-01-13 | End: 2023-01-13 | Stop reason: HOSPADM

## 2023-01-13 RX ORDER — DIPHENHYDRAMINE HYDROCHLORIDE 50 MG/ML
50 INJECTION INTRAMUSCULAR; INTRAVENOUS ONCE AS NEEDED
Status: DISCONTINUED | OUTPATIENT
Start: 2023-01-13 | End: 2023-01-13 | Stop reason: HOSPADM

## 2023-01-13 RX ORDER — DIPHENHYDRAMINE HYDROCHLORIDE 50 MG/ML
50 INJECTION INTRAMUSCULAR; INTRAVENOUS ONCE AS NEEDED
Status: CANCELLED | OUTPATIENT
Start: 2023-01-13

## 2023-01-13 RX ORDER — SODIUM CHLORIDE 0.9 % (FLUSH) 0.9 %
10 SYRINGE (ML) INJECTION
Status: CANCELLED | OUTPATIENT
Start: 2023-01-13

## 2023-01-13 RX ORDER — HEPARIN 100 UNIT/ML
500 SYRINGE INTRAVENOUS
Status: DISCONTINUED | OUTPATIENT
Start: 2023-01-13 | End: 2023-01-13 | Stop reason: HOSPADM

## 2023-01-13 RX ORDER — FAMOTIDINE 10 MG/ML
20 INJECTION INTRAVENOUS
Status: CANCELLED | OUTPATIENT
Start: 2023-01-13

## 2023-01-13 RX ORDER — FAMOTIDINE 10 MG/ML
20 INJECTION INTRAVENOUS
Status: COMPLETED | OUTPATIENT
Start: 2023-01-13 | End: 2023-01-13

## 2023-01-13 RX ADMIN — Medication 10 ML: at 12:01

## 2023-01-13 RX ADMIN — DEXAMETHASONE SODIUM PHOSPHATE 0.25 MG: 4 INJECTION, SOLUTION INTRA-ARTICULAR; INTRALESIONAL; INTRAMUSCULAR; INTRAVENOUS; SOFT TISSUE at 10:01

## 2023-01-13 RX ADMIN — DIPHENHYDRAMINE HYDROCHLORIDE 50 MG: 50 INJECTION, SOLUTION INTRAMUSCULAR; INTRAVENOUS at 10:01

## 2023-01-13 RX ADMIN — SODIUM CHLORIDE: 9 INJECTION, SOLUTION INTRAVENOUS at 09:01

## 2023-01-13 RX ADMIN — CARBOPLATIN 265 MG: 10 INJECTION, SOLUTION INTRAVENOUS at 12:01

## 2023-01-13 RX ADMIN — HEPARIN 500 UNITS: 100 SYRINGE at 12:01

## 2023-01-13 RX ADMIN — PACLITAXEL 120 MG: 6 INJECTION, SOLUTION, CONCENTRATE INTRAVENOUS at 11:01

## 2023-01-13 RX ADMIN — FAMOTIDINE 20 MG: 10 INJECTION INTRAVENOUS at 11:01

## 2023-01-13 NOTE — PLAN OF CARE
1256  Patient completed INV Opdivo, Taxol, Carbo, tolerated well.  Port de accessed without issue, flushed, blood return noted, flushed, heparin locked.  RTC 1/20/23  Patient ambulated off floor to go to radiation appt. NAD

## 2023-01-13 NOTE — PROGRESS NOTES
MEDICAL ONCOLOGY - ESTABLISHED PATIENT VISIT    Reason for visit: esophageal adenocarcinoma    Best Contact Phone Number(s): There are no phone numbers on file.     Cancer/Stage/TNM:    Cancer Staging   Esophageal adenocarcinoma  Staging form: Esophagus - Adenocarcinoma, AJCC 8th Edition  - Clinical stage from 11/17/2022: Stage III (cT3, cN0, cM0) - Signed by Javier Moulton MD on 12/14/2022       Oncology History   Esophageal adenocarcinoma   11/17/2022 Cancer Staged    Staging form: Esophagus - Adenocarcinoma, AJCC 8th Edition  - Clinical stage from 11/17/2022: Stage III (cT3, cN0, cM0)       12/8/2022 Initial Diagnosis    Esophageal adenocarcinoma     12/21/2022 - 12/21/2022 Chemotherapy    Treatment Summary   Plan Name: OP ESOPHAGEAL PACLITAXEL CARBOPLATIN WEEKLY  Treatment Goal: Curative  Status: Inactive  Start Date:   End Date:   Provider: Miki eMdrano MD  Chemotherapy: CARBOplatin (PARAPLATIN) in sodium chloride 0.9% 250 mL chemo infusion, , Intravenous, Clinic/HOD 1 time, 0 of 1 cycle  PACLitaxeL (TAXOL) 50 mg/m2 = 120 mg in sodium chloride 0.9% 250 mL chemo infusion, 50 mg/m2, Intravenous, Clinic/HOD 1 time, 0 of 1 cycle       12/29/2022 -  Chemotherapy    Treatment Summary   Plan Name: [facility] [id number] ARM B CARBOPLATIN PACLITAXEL NIVOLUMAB  Treatment Goal: Curative  Status: Active  Start Date: 12/29/2022  End Date: 1/27/2023 (Planned)  Provider: Miki Medrano MD  Chemotherapy: CARBOplatin (PARAPLATIN) 215 mg in sodium chloride 0.9% 306.5 mL chemo infusion, 215 mg, Intravenous, Clinic/HOD 1 time, 2 of 5 cycles  Administration: 215 mg (12/29/2022), 230 mg (1/5/2023)  PACLitaxeL (TAXOL) 50 mg/m2 = 120 mg in sodium chloride 0.9% 250 mL chemo infusion, 50 mg/m2 = 120 mg, Intravenous, Clinic/HOD 1 time, 2 of 5 cycles  Administration: 120 mg (12/29/2022), 120 mg (1/5/2023)            Interim History:   67 y.o. male with esophageal adenocarcinoma who presents for follow-up prior to his third dose of  "carboplatin + paclitaxel as part of neoadjuvant chemoradiation. He had insomnia on the night of his last infusion.  He had a fifteen minute episode at 1am on day 3 of nausea, chest discomfort that self resolved.  He reports some stomach uneasiness but not nausea, just not wanting to eat much.  He does continue to eat and does not have worsened nausea after eating.  No change in bowel movements.  Has mild fatigue.  Occasionally will have a "pulsing" sensation in his throat that is transient; has occurred both at rest and with activity.  He believes his dysphagia is more noticeable, eats slowly as he has to pay attention to have fast he is consuming food.  He feels that it is primarily due to over production of saliva/mucous.    Denies any other new concerns.     Presents alone. ECOG 0.     ROS:   Review of Systems   Constitutional:  Negative for appetite change, chills, fatigue, fever and unexpected weight change.   HENT:  Positive for trouble swallowing. Negative for mouth sores, nosebleeds and voice change.    Eyes:  Negative for visual disturbance.   Respiratory:  Negative for cough, shortness of breath and wheezing.    Cardiovascular:  Negative for chest pain, palpitations and leg swelling.   Gastrointestinal:  Negative for abdominal pain, blood in stool, constipation, diarrhea, nausea, reflux (on omeprazole) and fecal incontinence.   Genitourinary:  Negative for bladder incontinence, dysuria, frequency, hematuria and urgency.   Musculoskeletal:  Negative for arthralgias, back pain, gait problem, leg pain, myalgias and neck pain.   Integumentary:  Negative for rash and wound.   Neurological:  Negative for dizziness, facial asymmetry, weakness, light-headedness, headaches, coordination difficulties, memory loss and coordination difficulties.   Hematological:  Does not bruise/bleed easily.   Psychiatric/Behavioral:  Negative for agitation, behavioral problems, confusion and sleep disturbance. The patient is not " nervous/anxious.        Past Medical History:   Past Medical History:   Diagnosis Date    Anticoagulant long-term use     Diabetes mellitus     Onset late 50s/early 60s    Hyperlipidemia     Hypertension     Onset late 50s/early 60s    Kidney stones     Sleep apnea     since 2006        Past Surgical History:   Past Surgical History:   Procedure Laterality Date    CORONARY ANGIOGRAPHY N/A 9/30/2020    Procedure: ANGIOGRAM, CORONARY ARTERY;  Surgeon: John West MD;  Location: [facility] CATH LAB;  Service: Cardiology;  Laterality: N/A;    CYSTOSCOPY N/A 2/17/2022    Procedure: CYSTOSCOPY;  Surgeon: Lukasz Hughes MD;  Location: [facility] OR [address] [place] [address];  Service: Urology;  Laterality: N/A;    CYSTOSCOPY W/ URETERAL STENT PLACEMENT N/A 2/25/2022    Procedure: CYSTOSCOPY, WITH URETERAL STENT INSERTION;  Surgeon: Lukasz Hughes MD;  Location: [address];  Service: Urology;  Laterality: N/A;    ENDOSCOPIC ULTRASOUND OF UPPER GASTROINTESTINAL TRACT N/A 12/7/2022    Procedure: ULTRASOUND, UPPER GI TRACT, ENDOSCOPIC;  Surgeon: Darian Main MD;  Location: [facility];  Service: Endoscopy;  Laterality: N/A;  Approval to hold Xarelto rec'd from Dr. Nick (see t/e 12/5/22)-DS    ESOPHAGOGASTRODUODENOSCOPY N/A 11/17/2022    Procedure: EGD (ESOPHAGOGASTRODUODENOSCOPY);  Surgeon: Brody Gonzales MD;  Location: [address]);  Service: Endoscopy;  Laterality: N/A;  inst via email-ok to hold Xarelto x 2 days-MS    LASER LITHOTRIPSY Left 2/25/2022    Procedure: LITHOTRIPSY, USING LASER;  Surgeon: Lukasz uHghes MD;  Location: [address];  Service: Urology;  Laterality: Left;    LEFT HEART CATHETERIZATION Left 9/30/2020    Procedure: Left heart cath;  Surgeon: John West MD;  Location: [facility] CATH LAB;  Service: Cardiology;  Laterality: Left;    LITHOTRIPSY      PARATHYROIDECTOMY  1/1/2-107    PYELOSCOPY Left 2/25/2022    Procedure: PYELOSCOPY;  Surgeon: Lukasz Hughes MD;  Location: [address]  [facility];  Service: Urology;  Laterality: Left;    TRANSESOPHAGEAL ECHOCARDIOGRAPHY N/A 2022    Procedure: ECHOCARDIOGRAM, TRANSESOPHAGEAL;  Surgeon: Xander Diagnostic Provider;  Location: [facility] EP LAB;  Service: Cardiology;  Laterality: N/A;    TREATMENT OF CARDIAC ARRHYTHMIA N/A 2022    Procedure: Cardioversion or Defibrillation;  Surgeon: Iris Fisher NP;  Location: [facility] EP LAB;  Service: Cardiology;  Laterality: N/A;  afib, dccv, artie, anes, EH, 3prep    URETEROSCOPIC REMOVAL OF URETERIC CALCULUS Left 2022    Procedure: REMOVAL, CALCULUS, URETER, URETEROSCOPIC;  Surgeon: Lukasz Hughes MD;  Location: [facility] OR [facility];  Service: Urology;  Laterality: Left;    URETEROSCOPY Left 2022    Procedure: URETEROSCOPY;  Surgeon: Lukasz Hughes MD;  Location: [facility] OR [facility];  Service: Urology;  Laterality: Left;        Family History:   Family History   Problem Relation Age of Onset    Arthritis Mother     Heart disease Father 70        CABG    Arthritis Father     Diabetes Father     Kidney disease Father         had one kidney removed in early thirties    Arthritis Sister     Arthritis Sister     Hypertension Sister     Colon cancer Brother 32    Arthritis Brother     Alcohol abuse Paternal Aunt     Cancer Maternal Grandfather         throat cancer    Diabetes Paternal Grandmother     Colon polyps Paternal Grandfather     Colon cancer Paternal Grandfather     Cancer Paternal Grandfather         colon cancer at age 62        Social History:   Social History     Tobacco Use    Smoking status: Former     Packs/day: 1.00     Years: 7.00     Pack years: 7.00     Types: Cigarettes, Cigars     Start date: 1972     Quit date:      Years since quittin.6     Passive exposure: Never    Smokeless tobacco: Never    Tobacco comments:     smoking was off and on.  cumulative 7 years.  never more than three years in one stretch or more lj   Substance Use Topics    Alcohol use: Yes     Alcohol/week: 3.0  standard drinks     Types: 2 Cans of beer, 1 Shots of liquor per week     Comment: don't drink regularly.  6 to 7 monthly      I have reviewed and updated the patient's past medical, surgical, family and social histories.    Allergies:   Review of patient's allergies indicates:  No Known Allergies     Medications:   Current Outpatient Medications   Medication Sig Dispense Refill    ACCU-CHEK SOFTCLIX LANCETS Misc USE EVERY  each 6    allopurinoL (ZYLOPRIM) 100 MG tablet TAKE 1 TABLET BY MOUTH EVERY DAY 30 tablet 10    blood sugar diagnostic (ACCU-CHEK ANTONELLA PLUS TEST STRP) Strp 1 strip by Misc.(Non-Drug; Combo Route) route 3 (three) times daily before meals. 100 strip 11    blood-glucose meter kit Checks blood sugars 1x/daily. 1 each 12    glimepiride (AMARYL) 2 MG tablet TAKE 2 TABLETS BY MOUTH EVERY MORNING 180 tablet 2    lisinopriL-hydrochlorothiazide (PRINZIDE,ZESTORETIC) 20-25 mg Tab TAKE 1 TABLET BY MOUTH EVERY DAY 90 tablet 3    metFORMIN (GLUCOPHAGE) 500 MG tablet TAKE 2 TABLETS BY MOUTH EVERY MORNING AND 2 TABLETS BY MOUTH WITH DINNER IN EVENING 360 tablet 3    metoprolol succinate (TOPROL-XL) 25 MG 24 hr tablet Take 1 tablet (25 mg total) by mouth once daily. 30 tablet 11    nitroGLYCERIN (NITROSTAT) 0.4 MG SL tablet Place 1 tablet (0.4 mg total) under the tongue every 5 (five) minutes as needed for Chest pain. If you need a third tablet, call 911 60 tablet 12    omeprazole (PRILOSEC) 40 MG capsule Take 1 capsule (40 mg total) by mouth once daily. 90 capsule 3    potassium citrate (UROCIT-K) 10 mEq (1,080 mg) TbSR Take 1 tablet (10 mEq total) by mouth 3 (three) times daily with meals. 90 tablet 9    rivaroxaban (XARELTO) 20 mg Tab Take 1 tablet (20 mg total) by mouth daily with dinner or evening meal. 30 tablet 11    rosuvastatin (CRESTOR) 20 MG tablet TAKE 1 TABLET(20 MG) BY MOUTH EVERY DAY 30 tablet 11    tamsulosin (FLOMAX) 0.4 mg Cap Take 1 capsule (0.4 mg total) by mouth once daily. 30 capsule  11    testosterone (ANDROGEL) 20.25 mg/1.25 gram (1.62 %) GlPm Apply 4 pumps to shoulders daily 2 each 5     No current facility-administered medications for this visit.        Physical Exam:   /72 (BP Location: Left arm, Patient Position: Sitting, BP Method: Large (Automatic))   Pulse 74   Temp 99.2 °F (37.3 °C) (Oral)   Resp 16   Ht 6' (1.829 m)   Wt 116.5 kg (256 lb 13.4 oz)   SpO2 97%   BMI 34.83 kg/m²      ECOG Performance status: 0    Physical Exam  Vitals reviewed.   Constitutional:       General: He is not in acute distress.     Appearance: Normal appearance. He is not ill-appearing, toxic-appearing or diaphoretic.   HENT:      Head: Normocephalic and atraumatic.      Right Ear: External ear normal.      Left Ear: External ear normal.   Eyes:      General: No scleral icterus.     Extraocular Movements: Extraocular movements intact.      Conjunctiva/sclera: Conjunctivae normal.      Pupils: Pupils are equal, round, and reactive to light.   Cardiovascular:      Rate and Rhythm: Normal rate and regular rhythm.      Heart sounds: Normal heart sounds. No murmur heard.  Pulmonary:      Effort: Pulmonary effort is normal. No respiratory distress.      Breath sounds: Normal breath sounds. No wheezing or rales.   Abdominal:      General: Bowel sounds are normal. There is no distension.      Palpations: Abdomen is soft.      Tenderness: There is no abdominal tenderness.   Musculoskeletal:         General: No swelling.      Cervical back: Normal range of motion.   Lymphadenopathy:      Cervical: No cervical adenopathy.   Skin:     Coloration: Skin is not jaundiced.      Findings: No bruising, erythema or rash.   Neurological:      General: No focal deficit present.      Mental Status: He is alert and oriented to person, place, and time. Mental status is at baseline.      Cranial Nerves: No cranial nerve deficit.      Motor: No weakness.      Gait: Gait normal.   Psychiatric:         Mood and Affect: Mood  normal.         Behavior: Behavior normal.         Thought Content: Thought content normal.         Judgment: Judgment normal.       Labs:   Recent Results (from the past 48 hour(s))   Comprehensive Metabolic Panel    Collection Time: 01/12/23  2:35 PM   Result Value Ref Range    Sodium 136 136 - 145 mmol/L    Potassium 5.0 3.5 - 5.1 mmol/L    Chloride 102 95 - 110 mmol/L    CO2 26 23 - 29 mmol/L    Glucose 122 (H) 70 - 110 mg/dL    BUN 22 8 - 23 mg/dL    Creatinine 1.1 0.5 - 1.4 mg/dL    Calcium 9.7 8.7 - 10.5 mg/dL    Total Protein 7.1 6.0 - 8.4 g/dL    Albumin 4.1 3.5 - 5.2 g/dL    Total Bilirubin 0.8 0.1 - 1.0 mg/dL    Alkaline Phosphatase 53 (L) 55 - 135 U/L    AST 26 10 - 40 U/L    ALT 20 10 - 44 U/L    Anion Gap 8 8 - 16 mmol/L    eGFR >60.0 >60 mL/min/1.73 m^2   CBC W/ AUTO DIFFERENTIAL    Collection Time: 01/12/23  2:35 PM   Result Value Ref Range    WBC 3.54 (L) 3.90 - 12.70 K/uL    RBC 4.82 4.60 - 6.20 M/uL    Hemoglobin 13.8 (L) 14.0 - 18.0 g/dL    Hematocrit 43.7 40.0 - 54.0 %    MCV 91 82 - 98 fL    MCH 28.6 27.0 - 31.0 pg    MCHC 31.6 (L) 32.0 - 36.0 g/dL    RDW 14.4 11.5 - 14.5 %    Platelets 145 (L) 150 - 450 K/uL    MPV 9.8 9.2 - 12.9 fL    Immature Granulocytes 0.6 (H) 0.0 - 0.5 %    Gran # (ANC) 2.6 1.8 - 7.7 K/uL    Immature Grans (Abs) 0.02 0.00 - 0.04 K/uL    Lymph # 0.5 (L) 1.0 - 4.8 K/uL    Mono # 0.3 0.3 - 1.0 K/uL    Eos # 0.1 0.0 - 0.5 K/uL    Baso # 0.03 0.00 - 0.20 K/uL    nRBC 0 0 /100 WBC    Gran % 74.1 (H) 38.0 - 73.0 %    Lymph % 12.7 (L) 18.0 - 48.0 %    Mono % 9.3 4.0 - 15.0 %    Eosinophil % 2.5 0.0 - 8.0 %    Basophil % 0.8 0.0 - 1.9 %    Differential Method Automated    Magnesium    Collection Time: 01/12/23  2:35 PM   Result Value Ref Range    Magnesium 1.8 1.6 - 2.6 mg/dL   PHOSPHORUS    Collection Time: 01/12/23  2:35 PM   Result Value Ref Range    Phosphorus 2.8 2.7 - 4.5 mg/dL        I have reviewed the pertinent labs from 1/12/23 which are notable for mild  thrombocytopenia.  Cr 1.1, normal LFTs.    Imagin2022 - EUS  Impression:             - Esophageal mucosal changes consistent with Paredes's esophagus.   - Partially obstructing, malignant esophageal tumor was found in the lower third of the esophagus.   - Normal stomach.   - Normal examined duodenum.   - A mass was found in the lower third of the esophagus. A tissue diagnosis was obtained prior to this exam. This is of adenocarcinoma. This was staged T3 (based on invasion into) N0 Mx by endosonographic criteria.   - No specimens collected.     2022 - PET CT   Impression:  - Distal esophageal wall thickening and hypermetabolic activity in keeping with known esophageal cancer.  - No definite evidence of metastatic disease.  Subcentimeter gastrohepatic and perigastric lymph nodes without appreciable uptake.    I have personally reviewed the imaging which is notable for mass in distal esophagus without evidence of distant metastatic disease.    Path:     Final Pathologic Diagnosis  Abnormal   Gastroesophageal junction, biopsy:   - Adenocarcinoma.   - Background intestinal metaplasia with low and high-grade dysplasia.   - Focally suspicious for lymphovascular invasion.   - MMR pending, results will be issued in addendum.   - See comment.   COMMENT:  The biopsy shows adenocarcinoma with single cell infiltration of   the lamina propria highlighted on cytokeratin stain arising in a background   of intestinal metaplasia with low and high-grade dysplasia.  There is an area   suspicious for lymphovascular invasion on routine H&E sections however this   area is not confirmed on levels or immunostain for CD 34.            Assessment:       1. Esophageal adenocarcinoma    2. Hypertension associated with diabetes    3. Type 2 diabetes mellitus with stage 3 chronic kidney disease, without long-term current use of insulin, unspecified whether stage 3a or 3b CKD    4. CKD stage 3 due to type 2 diabetes mellitus    5.  Hyperlipidemia associated with type 2 diabetes mellitus    6. HFrEF (heart failure with reduced ejection fraction)    7. Paroxysmal atrial fibrillation    8. Coronary artery disease involving native coronary artery of native heart without angina pectoris    9. Thrombocytopenia          Plan:     # Esophageal adenocarcinoma   Mr. Person is a pleasant 67 year old male who presents to our clinic for management of recently diagnosed esophageal cancer. He initially presented with dysphagia, and further workup confirmed a stage II adenocarcinoma. Tumor staged T3N0Mx by endosonographic criteria, JEVON. CT CAP on 12/14/22 confirmed no evidence of metastatic disease.    We had an in-depth conversation regarding his diagnosis and treatment options. We recommend perioperative chemo/radiation per the CROSS trial. Discussed chemoradiation for ~5 weeks with 5 doses of weekly carboplatin and taxol administered. Plan to obtain restaging scans 4-5 weeks after radiation completed.     He also appeared to be a candidate for a cooperative group trial assessing perioperative immunotherapy treatment. He consented for this study - ECOG-ACRIN [id number]: A Phase II/III Study of Dilcia-operative Nivolumab and Ipilimumab in Patients with Locoregional Esophageal and Gastroesophageal Junction Adenocarcinoma    Met with Dr. Santana Brown who agreed he was a surgical candidate.  Met with Dr. Javier Moulton who will be treating him with radiation.    Dysphagia stable at this time with adoption of coping strategies.  Began cycle 1 carboplatin/paclitaxel/nivolumab on trial on 12/29/22.  Presents for cycle 3 today.  Will receive carboplatin + paclitaxel today - 1/13/23.  Continue XRT with Dr. Moulton.  Labs reviewed.  Mild thrombocytopenia.  Orders signed after timeout with [facility].  RTC in one week.    # HTN, HLD, DM, CKD  Following with PCP Dr. Wilson and nephrologist Dr. Oconnell.   BP and glucose controlled in clinic today.   Creatinine 1.1 on most recent  labs.   Continue medical management.   Monitor on treatment.     # CAD, A fib, CHF  Following with cardiologist Dr. Nick.   Currently asymptomatic.   On Xarelto.   Continue medical management.     Follow up: next week for continued chemoRT.    Miki Medrano MD  Hematology/Oncology  [facility]      Route Chart for Scheduling    Med Onc Chart Routing      Follow up with physician . Per research   Follow up with SAMUEL    Infusion scheduling note    Injection scheduling note    Labs    Imaging    Pharmacy appointment    Other referrals        Treatment Plan Information   [facility] [id number] ARM B CARBOPLATIN PACLITAXEL NIVOLUMAB   Miki Medrano MD   Upcoming Treatment Dates - [facility] [id number] ARM B CARBOPLATIN PACLITAXEL NIVOLUMAB    1/13/2023       Pre-Medications       palonosetron 0.25mg/dexAMETHasone 12mg in NS IVPB 0.25 mg 50 mL       diphenhydrAMINE (BENADRYL) 50 mg in NS 50 mL IVPB       famotidine (PF) injection 20 mg       Chemotherapy       INV nivolumab 240 mg in INV sodium chloride 0.9 % 100 mL chemo infusion       PACLitaxeL (TAXOL) 50 mg/m2 = 120 mg in sodium chloride 0.9% 250 mL chemo infusion       CARBOplatin (PARAPLATIN) 265 mg in sodium chloride 0.9% 276.5 mL chemo infusion  1/20/2023       Pre-Medications       palonosetron (ALOXI) 0.25 mg with Dexamethasone (DECADRON) 12 mg in NS 50 mL IVPB       FAMOTIDINE (PF) 20 MG/2 ML IV SOLN       diphenhydrAMINE (BENADRYL) 50 mg in sodium chloride 0.9% 50 mL IVPB       Chemotherapy       PACLitaxeL (TAXOL) 50 mg/m2 = 120 mg in sodium chloride 0.9% 250 mL chemo infusion       CARBOplatin (PARAPLATIN) 215 mg in sodium chloride 0.9% 271.5 mL chemo infusion  1/27/2023       Pre-Medications       palonosetron (ALOXI) 0.25 mg with Dexamethasone (DECADRON) 12 mg in NS 50 mL IVPB       FAMOTIDINE (PF) 20 MG/2 ML IV SOLN       diphenhydrAMINE (BENADRYL) 50 mg in sodium chloride 0.9% 50 mL IVPB       Chemotherapy       PACLitaxeL (TAXOL)  50 mg/m2 = 120 mg in sodium chloride 0.9% 250 mL chemo infusion       CARBOplatin (PARAPLATIN) 215 mg in sodium chloride 0.9% 271.5 mL chemo infusion

## 2023-01-13 NOTE — PLAN OF CARE
0900  Patient seated in chair, vss, assessment done.  Port accessed without issue, flushed, blood return noted, started NS @ 25 cc/hr KVO while waitinf for INV opdivo, taxol, carbo from pharmacy.  [facility] for safety

## 2023-01-13 NOTE — PROGRESS NOTES
": URIEL Manriquez MD  Treating Investigators: NIRAV Medrano MD  Consulting Surgical Oncologist: SARAH Brown M.D. / Gerri Zhang NP  Radiation Oncologist: Javier Moulton M.D, PhD    Protocol: ECOG-ACRIN [id number]  IRB#: [id number]  Patient ID: [id number]  Treatment: Arm B - Carbo+Taxol+Nivolumab    A Phase II/III Study of Dilcia-operative Nivolumab and Ipilimumab in Patients with Locoregional Esophageal and Gastroesophageal Junction Adenocarcinoma       Week 3 Day 1 Note: 13Jan2023  Patient presents to clinic unaccompanied for Week 3 Day 1. Patient queried & verbalized his consent to continue on above-mentioned study. Patient queried & continues to report mild fatigue & nausea/anorexia. He also reports more noticeable dysphagia from baseline, but states it feels like mucous rather than food stuffs "getting caught" & says he just has to eat slower. Patient queried & denies any ConMed changes.     Patient completed his safety labs yesterday, and his VS, PE & ECOG (ECOG = 0, per Dr. Medrano) were collected today. Dr. Medrano assessed all patient's labs, VS & PE findings as either WNL or NCS, deeming patient eligible to continue treatment with Carboplatin, Taxol & Nivolumab today.     Patient's baseline weight is 117.2 kg & baseline BSA is 2.44 m2. Per protocol, doses will only be adjusted if there is at least a +/- 10% weight change at subsequent cycles. Dosing serum creatinine 1.4 mg/dL from 28Dec2022.    Time Out w/ Dr. Medrano in exam room (baseline weight 117.2 kg - BSA = 2.44 m2 - 12Jan2023 Scr 1.1, CrCl 107 mL/min):  Paclitaxel: 50 mg/m2 = 122 mg (normalized = 120 mg)   Carboplatin: 2 AUC = 263.09 mg (normalized = 265 mg)  Nivolumab: 240 mg flat dose  All doses calculated & verified as correct in Epic.      Infusion RN Sonal Sams was instructed to administer the treatment per the treatment plan. RN expressed their understanding of all instructions. Patient completed Carbo/Taxol/Nivo treatment today " without event & was DC'd from clinic ambulatory and in stable condition. Please see Infusion RN note for further information.    Patient was encouraged to contact [facility] or Dr. Medrano's team with any questions or concerns & was reminded of clinic's 24/7 emergency contact number. Patient verbalized understanding & denies any additional questions at this time and is scheduled to RTC for Cycle 4 Day 1 as outlined below.         Week 4 Day 1:  [id number] @ 0800 - safety labs @ [facility]  [id number] @ 0930 - [facility] [address] Floor   [id number] @ 1000 - infusion (Carboplatin & Taxol only)          Solicited AEs:    Anemia - Grade 1 -- NCS per MD  Platelet count decreased - Grade 1 -- NCS per MD  White blood cell count decreased - Grade 1 -- NCS per MD  Neutrophil count decreased - Grade 0  Lymphocyte count decreased - Grade 2 -- NCS per MD  Peripheral motor neuropathy - Grade 0 -- Patient denies  Peripheral sensory neuropathy - Grade 0 -- Patient denies  Creatinine increased - Grade 0  Hypothyroidism - Grade 0 at baseline and TSH not assessed this visit.  Diarrhea (patient baseline BM = 2 stools a day) - Grade 0 -- denies changes in bowel habits  Fatigue - Grade 1 -- unchanged from prior  Nausea - Grade 1 -- unchanged from prior  Cough - Grade 0  Pneumonitis - Grade 0  Hyperglycemia - Grade 1 -- Patient has a history of diabetes. No changes in regimen indicated.   Adrenal Insufficiency - Grade 0  Rash-maculo-papular - Grade 0  Pruritus - Grade 0  Erythema multiforme - Grade 0  Vomiting - Grade 0  Lipase increased -- Not required per study calendar and therefore not assessed this visit.    New & Ongoing Non-Solicited AEs:  Hypophosphatemia - Grade 1 -- NCS and no intervention indicated. -- resolved on 01/12/2023 labs  Insomnia - Grade 1 - 06Jan2023 - 07Jan2023 (R/T steroids given during infusion 05Jan2023)           Complete Baseline Medical History, Adverse Events, and Concomitant Medications are located in patient's shadow chart

## 2023-01-17 ENCOUNTER — TELEPHONE (OUTPATIENT)
Dept: RADIATION ONCOLOGY | Facility: CLINIC | Age: 68
End: 2023-01-17
Payer: MEDICARE

## 2023-01-17 PROCEDURE — 77386 HC IMRT, COMPLEX: CPT | Performed by: STUDENT IN AN ORGANIZED HEALTH CARE EDUCATION/TRAINING PROGRAM

## 2023-01-17 PROCEDURE — 77014 PR  CT GUIDANCE PLACEMENT RAD THERAPY FIELDS: CPT | Mod: 26,,, | Performed by: STUDENT IN AN ORGANIZED HEALTH CARE EDUCATION/TRAINING PROGRAM

## 2023-01-17 PROCEDURE — 77014 HC CT GUIDANCE RADIATION THERAPY FLDS PLACEMENT: CPT | Mod: TC,Q1 | Performed by: STUDENT IN AN ORGANIZED HEALTH CARE EDUCATION/TRAINING PROGRAM

## 2023-01-17 PROCEDURE — 77014 PR  CT GUIDANCE PLACEMENT RAD THERAPY FIELDS: ICD-10-PCS | Mod: 26,,, | Performed by: STUDENT IN AN ORGANIZED HEALTH CARE EDUCATION/TRAINING PROGRAM

## 2023-01-17 NOTE — TELEPHONE ENCOUNTER
----- Message from Oneyda Jackson RN sent at 1/17/2023  8:48 AM CST -----  Regarding: Questions about dental appt today and any restrictions  Mr Person has a 10:30am dental appt today.    He has some questions about any restrictions he may have.    Can someone call him?    Oralia     Statement Selected

## 2023-01-18 ENCOUNTER — TELEPHONE (OUTPATIENT)
Dept: OPTOMETRY | Facility: CLINIC | Age: 68
End: 2023-01-18
Payer: MEDICARE

## 2023-01-18 ENCOUNTER — TELEPHONE (OUTPATIENT)
Dept: HEMATOLOGY/ONCOLOGY | Facility: CLINIC | Age: 68
End: 2023-01-18
Payer: MEDICARE

## 2023-01-18 PROCEDURE — 77014 PR  CT GUIDANCE PLACEMENT RAD THERAPY FIELDS: CPT | Mod: 26,,, | Performed by: STUDENT IN AN ORGANIZED HEALTH CARE EDUCATION/TRAINING PROGRAM

## 2023-01-18 PROCEDURE — 77014 PR  CT GUIDANCE PLACEMENT RAD THERAPY FIELDS: ICD-10-PCS | Mod: 26,,, | Performed by: STUDENT IN AN ORGANIZED HEALTH CARE EDUCATION/TRAINING PROGRAM

## 2023-01-18 PROCEDURE — 77014 HC CT GUIDANCE RADIATION THERAPY FLDS PLACEMENT: CPT | Mod: TC | Performed by: STUDENT IN AN ORGANIZED HEALTH CARE EDUCATION/TRAINING PROGRAM

## 2023-01-18 PROCEDURE — 77386 HC IMRT, COMPLEX: CPT | Performed by: STUDENT IN AN ORGANIZED HEALTH CARE EDUCATION/TRAINING PROGRAM

## 2023-01-18 NOTE — TELEPHONE ENCOUNTER
----- Message from Taty Romero sent at 1/18/2023  8:18 AM CST -----  Regarding: Appt Request  Patient called about scheduling his Follow up in about 1 year (around 2/8/2023), or if symptoms worsen or fail to improve. Pt asked to speak to office about being to scheduled in Feb due to chemo and surgery. First available appt found was March.       Requesting Call back number:[phone]

## 2023-01-18 NOTE — TELEPHONE ENCOUNTER
----- Message from Taty Romero sent at 1/18/2023 11:57 AM CST -----  Regarding: Appt Inquiry  Pt called about appt scheduled pt asked if 2/8 or that week was available?    Pts call back:[phone]

## 2023-01-18 NOTE — TELEPHONE ENCOUNTER
----- Message from Farzad Jackson sent at 1/17/2023  3:56 PM CST -----  Type:  Patient Returning Call    Who Called:Beckie from [facility]  Who Left Message for Patient:  Does the patient know what this is regarding?:tooth extraction  Would the patient rather a call back or a response via [facility]? Call  Best Call Back Number:[phone]  Additional Information: Dr Geni Damon from [facility] would like medical clearance to extract an tooth from pt, says that the tooth needs to be extracted soon to prevent infection,pt is having pain when chewing. Calling or fax is fine, Fax number [phone]

## 2023-01-19 PROCEDURE — 77014 HC CT GUIDANCE RADIATION THERAPY FLDS PLACEMENT: CPT | Mod: TC | Performed by: STUDENT IN AN ORGANIZED HEALTH CARE EDUCATION/TRAINING PROGRAM

## 2023-01-19 PROCEDURE — 77014 PR  CT GUIDANCE PLACEMENT RAD THERAPY FIELDS: CPT | Mod: 26,,, | Performed by: STUDENT IN AN ORGANIZED HEALTH CARE EDUCATION/TRAINING PROGRAM

## 2023-01-19 PROCEDURE — 77014 PR  CT GUIDANCE PLACEMENT RAD THERAPY FIELDS: ICD-10-PCS | Mod: 26,,, | Performed by: STUDENT IN AN ORGANIZED HEALTH CARE EDUCATION/TRAINING PROGRAM

## 2023-01-19 PROCEDURE — 77386 HC IMRT, COMPLEX: CPT | Performed by: STUDENT IN AN ORGANIZED HEALTH CARE EDUCATION/TRAINING PROGRAM

## 2023-01-19 NOTE — PROGRESS NOTES
MEDICAL ONCOLOGY - ESTABLISHED PATIENT VISIT    Reason for visit: Mr. Person is a 67 year old man with stage III (cT3N0) esophageal adenocarcinoma currently on neoadjuvant chemoradiation and immunotheraphy with weekly carboplatin/paclitaxel + biweekly nivolumab as part of [id number]. He presents to medical oncology clinic on D23 treatment.     Best Contact Phone Number(s): There are no phone numbers on file.     Cancer/Stage/TNM:    Cancer Staging   Esophageal adenocarcinoma  Staging form: Esophagus - Adenocarcinoma, AJCC 8th Edition  - Clinical stage from 11/17/2022: Stage III (cT3, cN0, cM0) - Signed by Javier Moulton MD on 12/14/2022       Oncology History   Esophageal adenocarcinoma   11/17/2022 Cancer Staged    Staging form: Esophagus - Adenocarcinoma, AJCC 8th Edition  - Clinical stage from 11/17/2022: Stage III (cT3, cN0, cM0)       12/8/2022 Initial Diagnosis    Esophageal adenocarcinoma     12/21/2022 - 12/21/2022 Chemotherapy    Treatment Summary   Plan Name: OP ESOPHAGEAL PACLITAXEL CARBOPLATIN WEEKLY  Treatment Goal: Curative  Status: Inactive  Start Date:   End Date:   Provider: Miki Medrano MD  Chemotherapy: CARBOplatin (PARAPLATIN) in sodium chloride 0.9% 250 mL chemo infusion, , Intravenous, Clinic/HOD 1 time, 0 of 1 cycle  PACLitaxeL (TAXOL) 50 mg/m2 = 120 mg in sodium chloride 0.9% 250 mL chemo infusion, 50 mg/m2, Intravenous, Clinic/HOD 1 time, 0 of 1 cycle       12/29/2022 -  Chemotherapy    Treatment Summary   Plan Name: [facility] [id number] ARM B CARBOPLATIN PACLITAXEL NIVOLUMAB  Treatment Goal: Curative  Status: Active  Start Date: 12/29/2022  End Date: 1/27/2023 (Planned)  Provider: Miki Medrano MD  Chemotherapy: CARBOplatin (PARAPLATIN) 215 mg in sodium chloride 0.9% 306.5 mL chemo infusion, 215 mg, Intravenous, Clinic/HOD 1 time, 3 of 5 cycles  Administration: 215 mg (12/29/2022), 230 mg (1/5/2023), 265 mg (1/13/2023)  PACLitaxeL (TAXOL) 50 mg/m2 = 120 mg in sodium chloride 0.9% 250 mL  chemo infusion, 50 mg/m2 = 120 mg, Intravenous, Clinic/HOD 1 time, 3 of 5 cycles  Dose modification: 50 mg/m2 (Cycle 4)  Administration: 120 mg (12/29/2022), 120 mg (1/5/2023), 120 mg (1/13/2023)            Interim History:     Patient received third weekly Carbo/Taxol 1/13/23 in addition to D15 nivolumab on 1/13/23.    Increased nausea this week described as mild. No emesis and no use of antiemetics . Loss of taste. Has ongoing dysphagia primarily with solids with associated globus sensation. . Has occaisional episodes of impaction with solids requiring regurgitation several times a week. Mild odynphagia as well described as a 'raw' throat. Overall tolerating po. No signficant dizziness or lightheadedness. Increasing  fatigue. Had an additional episode of chest pain described as his typical stable angina that was present before treatment.     Mild grade I dermatitis left buttock jusing moisturizing cream. Additional grouped macular papular areas over left forearm. No abdominal pain. Up to 2 epidodes daily a few times a week. No fevers or chills. No shortness of breath. Has mild stable cough which predates treatment.      ROS:   Review of Systems   Constitutional:  Negative for appetite change, chills, fatigue, fever and unexpected weight change.   HENT:  Positive for trouble swallowing. Negative for mouth sores and voice change.    Eyes:  Negative for visual disturbance.   Respiratory:  Positive for cough (chronic; associates with ACEi). Negative for shortness of breath and wheezing.    Cardiovascular:  Positive for chest pain (Stable angina predates diagnosis and treatment). Negative for palpitations and leg swelling.   Gastrointestinal:  Positive for diarrhea (Up to 2 episodes per day). Negative for abdominal pain, blood in stool, constipation, nausea, reflux (on omeprazole) and fecal incontinence.   Genitourinary:  Negative for bladder incontinence, dysuria, frequency, hematuria and urgency.   Musculoskeletal:   Negative for arthralgias, back pain, gait problem, leg pain, myalgias and neck pain.   Integumentary:  Positive for rash. Negative for wound.   Neurological:  Negative for dizziness, facial asymmetry, weakness, light-headedness, headaches, coordination difficulties, memory loss and coordination difficulties.   Hematological:  Does not bruise/bleed easily.   Psychiatric/Behavioral:  Negative for agitation, behavioral problems, confusion and sleep disturbance. The patient is not nervous/anxious.        Past Medical History:   Past Medical History:   Diagnosis Date    Anticoagulant long-term use     Diabetes mellitus     Onset late 50s/early 60s    Hyperlipidemia     Hypertension     Onset late 50s/early 60s    Kidney stones     Sleep apnea     since 2006        Past Surgical History:   Past Surgical History:   Procedure Laterality Date    CORONARY ANGIOGRAPHY N/A 9/30/2020    Procedure: ANGIOGRAM, CORONARY ARTERY;  Surgeon: John West MD;  Location: [facility] CATH LAB;  Service: Cardiology;  Laterality: N/A;    CYSTOSCOPY N/A 2/17/2022    Procedure: CYSTOSCOPY;  Surgeon: uLkasz Hughes MD;  Location: [facility] OR [address] [place] [address];  Service: Urology;  Laterality: N/A;    CYSTOSCOPY W/ URETERAL STENT PLACEMENT N/A 2/25/2022    Procedure: CYSTOSCOPY, WITH URETERAL STENT INSERTION;  Surgeon: Lukasz Hughes MD;  Location: [facility] OR [address] [place] [address];  Service: Urology;  Laterality: N/A;    ENDOSCOPIC ULTRASOUND OF UPPER GASTROINTESTINAL TRACT N/A 12/7/2022    Procedure: ULTRASOUND, UPPER GI TRACT, ENDOSCOPIC;  Surgeon: Darian Main MD;  Location: [facility] ENDO;  Service: Endoscopy;  Laterality: N/A;  Approval to hold Xarelto rec'd from Dr. Nick (see t/e 12/5/22)-DS    ESOPHAGOGASTRODUODENOSCOPY N/A 11/17/2022    Procedure: EGD (ESOPHAGOGASTRODUODENOSCOPY);  Surgeon: Brody Gonzales MD;  Location: [facility] ([address] [place] [address]);  Service: Endoscopy;  Laterality: N/A;  inst via email-ok to hold Xarelto x 2 days-MS    LASER LITHOTRIPSY Left  2/25/2022    Procedure: LITHOTRIPSY, USING LASER;  Surgeon: Lukasz Hughes MD;  [facility];  Service: Urology;  Laterality: Left;    LEFT HEART CATHETERIZATION Left 9/30/2020    Procedure: Left heart cath;  Surgeon: John West MD;  Location: [facility] CATH LAB;  Service: Cardiology;  Laterality: Left;    LITHOTRIPSY      PARATHYROIDECTOMY  1/1/2-107    PYELOSCOPY Left 2/25/2022    Procedure: PYELOSCOPY;  Surgeon: Lukasz Hughes MD;  [facility];  Service: Urology;  Laterality: Left;    TRANSESOPHAGEAL ECHOCARDIOGRAPHY N/A 4/7/2022    Procedure: ECHOCARDIOGRAM, TRANSESOPHAGEAL;  Surgeon: Xander Diagnostic Provider;  Location: [facility] EP LAB;  Service: Cardiology;  Laterality: N/A;    TREATMENT OF CARDIAC ARRHYTHMIA N/A 4/7/2022    Procedure: Cardioversion or Defibrillation;  Surgeon: Iris Fisher NP;  Location: [facility] EP LAB;  Service: Cardiology;  Laterality: N/A;  afib, dccv, artie, anes, EH, 3prep    URETEROSCOPIC REMOVAL OF URETERIC CALCULUS Left 2/25/2022    Procedure: REMOVAL, CALCULUS, URETER, URETEROSCOPIC;  Surgeon: Lukasz Hughes MD;  Location: [facility] OR [address] [place] [address];  Service: Urology;  Laterality: Left;    URETEROSCOPY Left 2/25/2022    Procedure: URETEROSCOPY;  Surgeon: Lukasz Hughes MD;  Location: [facility] OR [address] [place] [address];  Service: Urology;  Laterality: Left;        Family History:   Family History   Problem Relation Age of Onset    Arthritis Mother     Heart disease Father 70        CABG    Arthritis Father     Diabetes Father     Kidney disease Father         had one kidney removed in early thirties    Arthritis Sister     Arthritis Sister     Hypertension Sister     Colon cancer Brother 32    Arthritis Brother     Alcohol abuse Paternal Aunt     Cancer Maternal Grandfather         throat cancer    Diabetes Paternal Grandmother     Colon polyps Paternal Grandfather     Colon cancer Paternal Grandfather     Cancer Paternal Grandfather         colon cancer at age 62         Social History:   Social History     Tobacco Use    Smoking status: Former     Packs/day: 1.00     Years: 7.00     Pack years: 7.00     Types: Cigarettes, Cigars     Start date: 1972     Quit date:      Years since quittin.7     Passive exposure: Never    Smokeless tobacco: Never    Tobacco comments:     smoking was off and on.  cumulative 7 years.  never more than three years in one stretch or more lj   Substance Use Topics    Alcohol use: Yes     Alcohol/week: 3.0 standard drinks     Types: 2 Cans of beer, 1 Shots of liquor per week     Comment: don't drink regularly.  6 to 7 monthly      I have reviewed and updated the patient's past medical, surgical, family and social histories.    Allergies:   Review of patient's allergies indicates:  No Known Allergies     Medications:   Current Outpatient Medications   Medication Sig Dispense Refill    ACCU-CHEK SOFTCLIX LANCETS Misc USE EVERY  each 6    allopurinoL (ZYLOPRIM) 100 MG tablet TAKE 1 TABLET BY MOUTH EVERY DAY 30 tablet 10    blood sugar diagnostic (ACCU-CHEK ANTONELLA PLUS TEST STRP) Strp 1 strip by Misc.(Non-Drug; Combo Route) route 3 (three) times daily before meals. 100 strip 11    blood-glucose meter kit Checks blood sugars 1x/daily. 1 each 12    glimepiride (AMARYL) 2 MG tablet TAKE 2 TABLETS BY MOUTH EVERY MORNING 180 tablet 2    lisinopriL-hydrochlorothiazide (PRINZIDE,ZESTORETIC) 20-25 mg Tab TAKE 1 TABLET BY MOUTH EVERY DAY 90 tablet 3    metFORMIN (GLUCOPHAGE) 500 MG tablet TAKE 2 TABLETS BY MOUTH EVERY MORNING AND 2 TABLETS BY MOUTH WITH DINNER IN EVENING 360 tablet 3    metoprolol succinate (TOPROL-XL) 25 MG 24 hr tablet Take 1 tablet (25 mg total) by mouth once daily. 30 tablet 11    nitroGLYCERIN (NITROSTAT) 0.4 MG SL tablet Place 1 tablet (0.4 mg total) under the tongue every 5 (five) minutes as needed for Chest pain. If you need a third tablet, call 911 60 tablet 12    omeprazole (PRILOSEC) 40 MG capsule Take 1 capsule (40  "mg total) by mouth once daily. 90 capsule 3    potassium citrate (UROCIT-K) 10 mEq (1,080 mg) TbSR Take 1 tablet (10 mEq total) by mouth 3 (three) times daily with meals. 90 tablet 9    rivaroxaban (XARELTO) 20 mg Tab Take 1 tablet (20 mg total) by mouth daily with dinner or evening meal. 30 tablet 11    rosuvastatin (CRESTOR) 20 MG tablet TAKE 1 TABLET(20 MG) BY MOUTH EVERY DAY 30 tablet 11    tamsulosin (FLOMAX) 0.4 mg Cap Take 1 capsule (0.4 mg total) by mouth once daily. 30 capsule 11    testosterone (ANDROGEL) 20.25 mg/1.25 gram (1.62 %) GlPm Apply 4 pumps to shoulders daily 2 each 5     No current facility-administered medications for this visit.        Physical Exam:   BP (!) 150/68 (BP Location: Left arm, Patient Position: Sitting, BP Method: Medium (Automatic))   Pulse 80   Temp 98.5 °F (36.9 °C) (Oral)   Resp 18   Ht 6' 1" (1.854 m)   Wt 115.5 kg (254 lb 10.1 oz)   SpO2 97%   BMI 33.59 kg/m²      ECOG Performance status: 0    Physical Exam  Vitals reviewed.   Constitutional:       General: He is not in acute distress.     Appearance: Normal appearance. He is not ill-appearing, toxic-appearing or diaphoretic.   HENT:      Head: Normocephalic and atraumatic.   Eyes:      General: No scleral icterus.     Extraocular Movements: Extraocular movements intact.      Conjunctiva/sclera: Conjunctivae normal.      Pupils: Pupils are equal, round, and reactive to light.   Cardiovascular:      Rate and Rhythm: Normal rate and regular rhythm.      Heart sounds: Normal heart sounds. No murmur heard.  Pulmonary:      Effort: Pulmonary effort is normal. No respiratory distress.      Breath sounds: Normal breath sounds. No wheezing or rales.   Abdominal:      General: Bowel sounds are normal. There is no distension.      Palpations: Abdomen is soft.      Tenderness: There is no abdominal tenderness.   Musculoskeletal:         General: No swelling.      Cervical back: Normal range of motion.   Lymphadenopathy:      " Cervical: No cervical adenopathy.   Skin:     Coloration: Skin is not jaundiced.      Findings: Rash (3cm grouped maculopapular rash over left buttock; grouped erythematoous papuls over left forarm with scale) present. No bruising or erythema.   Neurological:      General: No focal deficit present.      Mental Status: He is alert and oriented to person, place, and time. Mental status is at baseline.      Cranial Nerves: No cranial nerve deficit.      Motor: No weakness.      Gait: Gait normal.   Psychiatric:         Mood and Affect: Mood normal.         Behavior: Behavior normal.         Thought Content: Thought content normal.         Judgment: Judgment normal.       Labs:   Recent Results (from the past 48 hour(s))   Comprehensive Metabolic Panel    Collection Time: 01/19/23  7:55 AM   Result Value Ref Range    Sodium 134 (L) 136 - 145 mmol/L    Potassium 5.0 3.5 - 5.1 mmol/L    Chloride 99 95 - 110 mmol/L    CO2 28 23 - 29 mmol/L    Glucose 150 (H) 70 - 110 mg/dL    BUN 22 8 - 23 mg/dL    Creatinine 1.1 0.5 - 1.4 mg/dL    Calcium 9.7 8.7 - 10.5 mg/dL    Total Protein 7.2 6.0 - 8.4 g/dL    Albumin 4.1 3.5 - 5.2 g/dL    Total Bilirubin 0.8 0.1 - 1.0 mg/dL    Alkaline Phosphatase 52 (L) 55 - 135 U/L    AST 21 10 - 40 U/L    ALT 20 10 - 44 U/L    Anion Gap 7 (L) 8 - 16 mmol/L    eGFR >60.0 >60 mL/min/1.73 m^2   CBC W/ AUTO DIFFERENTIAL    Collection Time: 01/19/23  7:55 AM   Result Value Ref Range    WBC 2.79 (L) 3.90 - 12.70 K/uL    RBC 4.78 4.60 - 6.20 M/uL    Hemoglobin 14.1 14.0 - 18.0 g/dL    Hematocrit 43.4 40.0 - 54.0 %    MCV 91 82 - 98 fL    MCH 29.5 27.0 - 31.0 pg    MCHC 32.5 32.0 - 36.0 g/dL    RDW 14.6 (H) 11.5 - 14.5 %    Platelets 103 (L) 150 - 450 K/uL    MPV 9.8 9.2 - 12.9 fL    Immature Granulocytes 0.4 0.0 - 0.5 %    Gran # (ANC) 2.1 1.8 - 7.7 K/uL    Immature Grans (Abs) 0.01 0.00 - 0.04 K/uL    Lymph # 0.3 (L) 1.0 - 4.8 K/uL    Mono # 0.3 0.3 - 1.0 K/uL    Eos # 0.0 0.0 - 0.5 K/uL    Baso # 0.02  0.00 - 0.20 K/uL    nRBC 0 0 /100 WBC    Gran % 76.7 (H) 38.0 - 73.0 %    Lymph % 10.8 (L) 18.0 - 48.0 %    Mono % 10.0 4.0 - 15.0 %    Eosinophil % 1.4 0.0 - 8.0 %    Basophil % 0.7 0.0 - 1.9 %    Differential Method Automated    Magnesium    Collection Time: 23  7:55 AM   Result Value Ref Range    Magnesium 1.6 1.6 - 2.6 mg/dL   PHOSPHORUS    Collection Time: 23  7:55 AM   Result Value Ref Range    Phosphorus 3.1 2.7 - 4.5 mg/dL        I have reviewed the pertinent labs from 23 which are notable for mild thrombocytopenia.  Cr 1.1, normal LFTs.    Imagin2022 - EUS  Impression:             - Esophageal mucosal changes consistent with Paredes's esophagus.   - Partially obstructing, malignant esophageal tumor was found in the lower third of the esophagus.   - Normal stomach.   - Normal examined duodenum.   - A mass was found in the lower third of the esophagus. A tissue diagnosis was obtained prior to this exam. This is of adenocarcinoma. This was staged T3 (based on invasion into) N0 Mx by endosonographic criteria.   - No specimens collected.     2022 - PET CT   Impression:  - Distal esophageal wall thickening and hypermetabolic activity in keeping with known esophageal cancer.  - No definite evidence of metastatic disease.  Subcentimeter gastrohepatic and perigastric lymph nodes without appreciable uptake.    I have personally reviewed the imaging which is notable for mass in distal esophagus without evidence of distant metastatic disease.    Path:     Final Pathologic Diagnosis  Abnormal   Gastroesophageal junction, biopsy:   - Adenocarcinoma.   - Background intestinal metaplasia with low and high-grade dysplasia.   - Focally suspicious for lymphovascular invasion.   - MMR pending, results will be issued in addendum.   - See comment.   COMMENT:  The biopsy shows adenocarcinoma with single cell infiltration of   the lamina propria highlighted on cytokeratin stain arising in a  background   of intestinal metaplasia with low and high-grade dysplasia.  There is an area   suspicious for lymphovascular invasion on routine H&E sections however this   area is not confirmed on levels or immunostain for CD 34.            Assessment:       No diagnosis found.        Plan:     # Esophageal adenocarcinoma   Mr. Person is a pleasant 67 year old male who presents to our clinic for management of recently diagnosed esophageal cancer. He initially presented with dysphagia, and further workup confirmed a stage II adenocarcinoma. Tumor staged T3N0Mx by endosonographic criteria, JEVON. CT CAP on 12/14/22 confirmed no evidence of metastatic disease.    Previously conversation regarding his diagnosis and treatment options.  Recommended perioperative chemo/radiation per the CROSS trial. Discussed chemoradiation for ~5 weeks with 5 doses of weekly carboplatin and taxol administered. Plan to obtain restaging scans 4-5 weeks after radiation completed.     He was a candidate for a cooperative group trial assessing perioperative immunotherapy treatment. He consented for this study - ECOG-ACRIN [id number]: A Phase II/III Study of Dilcia-operative Nivolumab and Ipilimumab in Patients with Locoregional Esophageal and Gastroesophageal Junction Adenocarcinoma    Previously met with Dr. Santana Brown who agreed he was a surgical candidate.  Previously met with Dr. Javier Moulton who will be treating him with radiation.    Began cycle 1 carboplatin/paclitaxel/nivolumab on trial on 12/29/22.  Presents for cycle 4 today.  Has mild rash well controlled with topical moisturizer. Can use topical steroid as needed. Mild thrombocytopenia persists but appropriate for ongoing treatment. Counseled paint on ongoing dysphagia with occaisional impaction. Will present for evaluation if unable to clear or if frequency increases. Will receive carboplatin + paclitaxel today - 1/20/23.  Continue XRT with Dr. Moulton.  Labs reviewed.   Orders  signed after timeout with [facility].  RTC in one week.    # HTN, HLD, DM, CKD  Following with PCP Dr. Wilson and nephrologist Dr. Oconnell.   BP and glucose controlled in clinic today.   Creatinine 1.1 on most recent labs.   Continue medical management.   Monitor on treatment.     # CAD, A fib, CHF  Following with cardiologist Dr. Nick.   Currently asymptomatic.   On Xarelto.   Continue medical management.     Follow up: next week for continued chemoRT.    Sumanth Reed MD  Hematology/Oncology  [facility]      Route Chart for Scheduling    Med Onc Chart Routing      Follow up with physician . FU appropriately scheduled for next week   Follow up with SAMUEL    Infusion scheduling note    Injection scheduling note    Labs    Imaging    Pharmacy appointment    Other referrals        Treatment Plan Information   [facility] [id number] ARM B CARBOPLATIN PACLITAXEL NIVOLUMAB   Miki Medrano MD   Upcoming Treatment Dates - [facility] [id number] ARM B CARBOPLATIN PACLITAXEL NIVOLUMAB    1/20/2023       Pre-Medications       palonosetron (ALOXI) 0.25 mg with Dexamethasone (DECADRON) 12 mg in NS 50 mL IVPB       famotidine (PF) injection 20 mg       diphenhydrAMINE (BENADRYL) 50 mg in sodium chloride 0.9% 50 mL IVPB       Chemotherapy       CARBOplatin (PARAPLATIN) 265 mg in sodium chloride 0.9% 276.5 mL chemo infusion       PACLitaxeL (TAXOL) 50 mg/m2 = 120 mg in sodium chloride 0.9% 250 mL chemo infusion  1/27/2023       Pre-Medications       palonosetron (ALOXI) 0.25 mg with Dexamethasone (DECADRON) 12 mg in NS 50 mL IVPB       famotidine (PF) injection 20 mg       diphenhydrAMINE (BENADRYL) 50 mg in sodium chloride 0.9% 50 mL IVPB       Chemotherapy       CARBOplatin (PARAPLATIN) 215 mg in sodium chloride 0.9% 271.5 mL chemo infusion       PACLitaxeL (TAXOL) 50 mg/m2 = 120 mg in sodium chloride 0.9% 250 mL chemo infusion

## 2023-01-20 ENCOUNTER — RESEARCH ENCOUNTER (OUTPATIENT)
Dept: RESEARCH | Facility: HOSPITAL | Age: 68
End: 2023-01-20
Payer: MEDICARE

## 2023-01-20 ENCOUNTER — INFUSION (OUTPATIENT)
Dept: INFUSION THERAPY | Facility: HOSPITAL | Age: 68
End: 2023-01-20
Payer: MEDICARE

## 2023-01-20 ENCOUNTER — OFFICE VISIT (OUTPATIENT)
Dept: HEMATOLOGY/ONCOLOGY | Facility: CLINIC | Age: 68
End: 2023-01-20
Payer: MEDICARE

## 2023-01-20 ENCOUNTER — DOCUMENTATION ONLY (OUTPATIENT)
Dept: RADIATION ONCOLOGY | Facility: CLINIC | Age: 68
End: 2023-01-20
Payer: MEDICARE

## 2023-01-20 VITALS
HEART RATE: 72 BPM | TEMPERATURE: 99 F | RESPIRATION RATE: 18 BRPM | SYSTOLIC BLOOD PRESSURE: 138 MMHG | DIASTOLIC BLOOD PRESSURE: 74 MMHG

## 2023-01-20 VITALS
OXYGEN SATURATION: 97 % | RESPIRATION RATE: 18 BRPM | BODY MASS INDEX: 33.75 KG/M2 | HEART RATE: 80 BPM | DIASTOLIC BLOOD PRESSURE: 68 MMHG | WEIGHT: 254.63 LBS | TEMPERATURE: 99 F | HEIGHT: 73 IN | SYSTOLIC BLOOD PRESSURE: 150 MMHG

## 2023-01-20 DIAGNOSIS — N18.30 TYPE 2 DIABETES MELLITUS WITH STAGE 3 CHRONIC KIDNEY DISEASE, WITHOUT LONG-TERM CURRENT USE OF INSULIN, UNSPECIFIED WHETHER STAGE 3A OR 3B CKD: ICD-10-CM

## 2023-01-20 DIAGNOSIS — D69.6 THROMBOCYTOPENIA: ICD-10-CM

## 2023-01-20 DIAGNOSIS — Z00.6 CLINICAL TRIAL PARTICIPANT: ICD-10-CM

## 2023-01-20 DIAGNOSIS — C15.9 ESOPHAGEAL ADENOCARCINOMA: Primary | ICD-10-CM

## 2023-01-20 DIAGNOSIS — E11.22 CKD STAGE 3 DUE TO TYPE 2 DIABETES MELLITUS: ICD-10-CM

## 2023-01-20 DIAGNOSIS — N18.30 CKD STAGE 3 DUE TO TYPE 2 DIABETES MELLITUS: ICD-10-CM

## 2023-01-20 DIAGNOSIS — E11.22 TYPE 2 DIABETES MELLITUS WITH STAGE 3 CHRONIC KIDNEY DISEASE, WITHOUT LONG-TERM CURRENT USE OF INSULIN, UNSPECIFIED WHETHER STAGE 3A OR 3B CKD: ICD-10-CM

## 2023-01-20 DIAGNOSIS — R13.10 DYSPHAGIA, UNSPECIFIED TYPE: ICD-10-CM

## 2023-01-20 DIAGNOSIS — Z79.899 ON ANTINEOPLASTIC CHEMOTHERAPY: ICD-10-CM

## 2023-01-20 PROCEDURE — 99999 PR PBB SHADOW E&M-EST. PATIENT-LVL III: ICD-10-PCS | Mod: PBBFAC,,, | Performed by: HOSPITALIST

## 2023-01-20 PROCEDURE — 99213 OFFICE O/P EST LOW 20 MIN: CPT | Mod: PBBFAC,25 | Performed by: HOSPITALIST

## 2023-01-20 PROCEDURE — 77386 HC IMRT, COMPLEX: CPT | Performed by: STUDENT IN AN ORGANIZED HEALTH CARE EDUCATION/TRAINING PROGRAM

## 2023-01-20 PROCEDURE — 77014 PR  CT GUIDANCE PLACEMENT RAD THERAPY FIELDS: CPT | Mod: 26,,, | Performed by: STUDENT IN AN ORGANIZED HEALTH CARE EDUCATION/TRAINING PROGRAM

## 2023-01-20 PROCEDURE — 96413 CHEMO IV INFUSION 1 HR: CPT | Mod: Q1

## 2023-01-20 PROCEDURE — 63600175 PHARM REV CODE 636 W HCPCS: Mod: Q1 | Performed by: HOSPITALIST

## 2023-01-20 PROCEDURE — A4216 STERILE WATER/SALINE, 10 ML: HCPCS | Performed by: HOSPITALIST

## 2023-01-20 PROCEDURE — 99214 OFFICE O/P EST MOD 30 MIN: CPT | Mod: Q1,S$PBB,, | Performed by: HOSPITALIST

## 2023-01-20 PROCEDURE — 25000003 PHARM REV CODE 250: Mod: Q1 | Performed by: HOSPITALIST

## 2023-01-20 PROCEDURE — 77014 PR  CT GUIDANCE PLACEMENT RAD THERAPY FIELDS: ICD-10-PCS | Mod: 26,,, | Performed by: STUDENT IN AN ORGANIZED HEALTH CARE EDUCATION/TRAINING PROGRAM

## 2023-01-20 PROCEDURE — 96375 TX/PRO/DX INJ NEW DRUG ADDON: CPT | Mod: Q1

## 2023-01-20 PROCEDURE — 77014 HC CT GUIDANCE RADIATION THERAPY FLDS PLACEMENT: CPT | Mod: TC,Q1 | Performed by: STUDENT IN AN ORGANIZED HEALTH CARE EDUCATION/TRAINING PROGRAM

## 2023-01-20 PROCEDURE — 99214 PR OFFICE/OUTPT VISIT, EST, LEVL IV, 30-39 MIN: ICD-10-PCS | Mod: Q1,S$PBB,, | Performed by: HOSPITALIST

## 2023-01-20 PROCEDURE — 96417 CHEMO IV INFUS EACH ADDL SEQ: CPT

## 2023-01-20 PROCEDURE — 77336 RADIATION PHYSICS CONSULT: CPT | Mod: Q1 | Performed by: STUDENT IN AN ORGANIZED HEALTH CARE EDUCATION/TRAINING PROGRAM

## 2023-01-20 PROCEDURE — 99999 PR PBB SHADOW E&M-EST. PATIENT-LVL III: CPT | Mod: PBBFAC,,, | Performed by: HOSPITALIST

## 2023-01-20 PROCEDURE — 96367 TX/PROPH/DG ADDL SEQ IV INF: CPT

## 2023-01-20 RX ORDER — SODIUM CHLORIDE 0.9 % (FLUSH) 0.9 %
10 SYRINGE (ML) INJECTION
Status: CANCELLED | OUTPATIENT
Start: 2023-01-20

## 2023-01-20 RX ORDER — HEPARIN 100 UNIT/ML
500 SYRINGE INTRAVENOUS
Status: CANCELLED | OUTPATIENT
Start: 2023-01-20

## 2023-01-20 RX ORDER — DIPHENHYDRAMINE HYDROCHLORIDE 50 MG/ML
50 INJECTION INTRAMUSCULAR; INTRAVENOUS ONCE AS NEEDED
Status: CANCELLED | OUTPATIENT
Start: 2023-01-20

## 2023-01-20 RX ORDER — SODIUM CHLORIDE 0.9 % (FLUSH) 0.9 %
10 SYRINGE (ML) INJECTION
Status: DISCONTINUED | OUTPATIENT
Start: 2023-01-20 | End: 2023-01-20 | Stop reason: HOSPADM

## 2023-01-20 RX ORDER — DIPHENHYDRAMINE HYDROCHLORIDE 50 MG/ML
50 INJECTION INTRAMUSCULAR; INTRAVENOUS ONCE AS NEEDED
Status: DISCONTINUED | OUTPATIENT
Start: 2023-01-20 | End: 2023-01-20 | Stop reason: HOSPADM

## 2023-01-20 RX ORDER — HEPARIN 100 UNIT/ML
500 SYRINGE INTRAVENOUS
Status: DISCONTINUED | OUTPATIENT
Start: 2023-01-20 | End: 2023-01-20 | Stop reason: HOSPADM

## 2023-01-20 RX ORDER — FAMOTIDINE 10 MG/ML
20 INJECTION INTRAVENOUS
Status: CANCELLED | OUTPATIENT
Start: 2023-01-20 | End: 2023-01-20

## 2023-01-20 RX ORDER — EPINEPHRINE 0.3 MG/.3ML
0.3 INJECTION SUBCUTANEOUS ONCE AS NEEDED
Status: DISCONTINUED | OUTPATIENT
Start: 2023-01-20 | End: 2023-01-20 | Stop reason: HOSPADM

## 2023-01-20 RX ORDER — FAMOTIDINE 10 MG/ML
20 INJECTION INTRAVENOUS
Status: COMPLETED | OUTPATIENT
Start: 2023-01-20 | End: 2023-01-20

## 2023-01-20 RX ORDER — EPINEPHRINE 0.3 MG/.3ML
0.3 INJECTION SUBCUTANEOUS ONCE AS NEEDED
Status: CANCELLED | OUTPATIENT
Start: 2023-01-20

## 2023-01-20 RX ADMIN — SODIUM CHLORIDE: 9 INJECTION, SOLUTION INTRAVENOUS at 10:01

## 2023-01-20 RX ADMIN — HEPARIN 500 UNITS: 100 SYRINGE at 01:01

## 2023-01-20 RX ADMIN — FAMOTIDINE 20 MG: 10 INJECTION INTRAVENOUS at 10:01

## 2023-01-20 RX ADMIN — Medication 10 ML: at 01:01

## 2023-01-20 RX ADMIN — CARBOPLATIN 265 MG: 10 INJECTION INTRAVENOUS at 12:01

## 2023-01-20 RX ADMIN — PACLITAXEL 120 MG: 6 INJECTION, SOLUTION INTRAVENOUS at 11:01

## 2023-01-20 RX ADMIN — DIPHENHYDRAMINE HYDROCHLORIDE 50 MG: 50 INJECTION, SOLUTION INTRAMUSCULAR; INTRAVENOUS at 10:01

## 2023-01-20 RX ADMIN — DEXAMETHASONE SODIUM PHOSPHATE 0.25 MG: 4 INJECTION, SOLUTION INTRA-ARTICULAR; INTRALESIONAL; INTRAMUSCULAR; INTRAVENOUS; SOFT TISSUE at 10:01

## 2023-01-20 NOTE — PLAN OF CARE
Pt tolerated Trial [id number] INV Taxol/ Carbo today. NAD. Port flushed + blood return present, flushed. Hep lock and deaccesed. declined AVS. Uses [facility]. Discharged home. Ambulated independently.   Problem: Adult Inpatient Plan of Care  Goal: Optimal Comfort and Wellbeing  Intervention: Provide Person-Centered Care  Flowsheets (Taken 1/20/2023 1306)  Trust Relationship/Rapport:   care explained   thoughts/feelings acknowledged   choices provided   emotional support provided   empathic listening provided   questions answered   questions encouraged   reassurance provided

## 2023-01-20 NOTE — PROGRESS NOTES
: URIEL Manriquez MD  Treating Investigators: NIRAV Medrano MD  Surgical Oncologist: SARAH Brown M.D. / Gerri Zhang NP  Radiation Oncologist: Javier Moulton M.D, PhD    Protocol: ECOG-ACRIN [id number]  IRB#: [id number]  Patient ID: [id number]  Treatment: Arm B - Carbo+Taxol+Nivolumab    A Phase II/III Study of Dilcia-operative Nivolumab and Ipilimumab in Patients with Locoregional Esophageal and Gastroesophageal Junction Adenocarcinoma       Week 4 Day 1 Note: 20Jan2023  Patient presents to clinic unaccompanied for Week 4 Day 1. Patient queried & verbalized his consent to continue on above-mentioned study. Patient queried & continues to report mild fatigue, nausea, anorexia & intermittent insomnia, in addition to persistent baseline conditions of cough (r/t lisinopril) and dysphagia -- reporting that most days solid food gets stuck requiring regurgitation. He reports another episode of chest pain since last week with moderate exercise, loose stools x 3 days per week, a maculopapular rash on buttocks with mild pruritis (started using CeraVe moisturizer), and mild throat irritation and pain (r/t radiation). Patient queried & denies any ConMed changes.     Patient completed his safety labs yesterday, and his VS, PE & ECOG (ECOG = 0, per Dr. Reed) were collected today. Dr. Reed assessed all patient's labs, VS & PE findings as either WNL or NCS, deeming patient eligible to continue treatment with Carboplatin & Paclitaxel today.     Patient's baseline weight is 117.2 kg & baseline BSA is 2.44 m2. Per protocol, doses will only be adjusted if there is at least a +/- 10% weight change at subsequent cycles. Patient's weight today is 115.5 kg and DOES NOT meet dose adjustment criteria. Serum creatinine 1.1 mg/dL on 19Jan2023, CrCl is 107 mL/min    Time Out w/ Dr. Reed in exam room (baseline weight 117.2 kg - BSA = 2.44 m2 - 19Jan2023 Scr 1.1, CrCl 107 mL/min):  Paclitaxel: 50 mg/m2 = 122 mg (normalized = 120  mg)   Carboplatin: 2 AUC = 263.09 mg (normalized = 265 mg)  All doses calculated & verified as correct in Epic.      Infusion RN Camille Combs was instructed to administer the treatment per the treatment plan. RN expressed their understanding of all instructions. Patient completed Carbo/Taxol treatment today without event & was DC'd from clinic ambulatory and in stable condition. Please see Infusion RN note for further information.    Patient was encouraged to contact [facility] or Neva Medrano or Brianna's team with any questions or concerns & was reminded of clinic's 24/7 emergency contact number. Patient verbalized understanding & denies any additional questions at this time and is scheduled to RTC for Cycle 5 Day 1 as outlined below. Patient reports that final RT is scheduled for 01Feb2023.        Week 5 Day 1:  [id number] @ 0800 - safety labs @ [facility]  [id number] @ 0800 - [facility] PCTP  [id number] @ 0900 - infusion (Carboplatin & Taxol only)         Solicited AEs:    *Anemia - Grade 0 -- 19Jan2023  Platelet count decreased - Grade 1 -- NCS per MD  *White blood cell count decreased - Grade 2 -- [id number] -- NCS per MD  Neutrophil count decreased - Grade 0  *Lymphocyte count decreased - Grade 3 -- 19Jan2023 -- NCS per MD (R/T Nivo)  Peripheral motor neuropathy - Grade 0   Peripheral sensory neuropathy - Grade 0   Creatinine increased - Grade 0  Hypothyroidism - Grade 0 -- assessed 19Jan2023 (WNL)  Diarrhea (patient baseline BM = 2 stools a day) - Grade 0 -- reports loose stools vs. diarrhea   Fatigue - Grade 1 -- unchanged from prior  Nausea - Grade 1 -- unchanged from prior  *Cough - Grade 0 -- newly reported symptom since starting lisinopril/HCTZ several years ago  Pneumonitis - Grade 0  Hyperglycemia - Grade 1 -- Patient has a history of diabetes. No changes in regimen indicated.   Adrenal Insufficiency - Grade 0  *Rash-maculo-papular - Grade 1 - Buttocks -- assessed 20Jan2023, started using CeraVe yesterday  *Pruritus -  Grade 1 -- Buttocks -- started using CeraVe with relief  Erythema multiforme - Grade 0  Vomiting - Grade 0  Lipase increased -- Not required per study calendar and therefore not assessed this visit.      New & Ongoing Non-Solicited AEs:  Insomnia, int. - Grade 1 - 06Jan2023 - ongoing (R/T steroids given during infusion)  Anorexia - Grade 1 - 09Jan2023 - ongoing (no treatment)        Complete Baseline Medical History, Adverse Events, and Concomitant Medications are located in patient's shadow chart

## 2023-01-20 NOTE — PATIENT INSTRUCTIONS
Tolerating treatment well with expected side effects including difficulty swallowing and pain. Also with mild rash. Can use OTC steroid cream if needed.     Follow up in 1 week for ongoing treatment.

## 2023-01-23 ENCOUNTER — TELEPHONE (OUTPATIENT)
Dept: CARDIOLOGY | Facility: CLINIC | Age: 68
End: 2023-01-23
Payer: MEDICARE

## 2023-01-23 ENCOUNTER — PATIENT MESSAGE (OUTPATIENT)
Dept: SURGERY | Facility: CLINIC | Age: 68
End: 2023-01-23
Payer: MEDICARE

## 2023-01-23 ENCOUNTER — PATIENT MESSAGE (OUTPATIENT)
Dept: CARDIOLOGY | Facility: CLINIC | Age: 68
End: 2023-01-23
Payer: MEDICARE

## 2023-01-23 DIAGNOSIS — C15.9 ESOPHAGEAL ADENOCARCINOMA: Primary | ICD-10-CM

## 2023-01-23 DIAGNOSIS — D49.1 NEOPLASM OF UNSPECIFIED BEHAVIOR OF RESPIRATORY SYSTEM: ICD-10-CM

## 2023-01-23 PROCEDURE — 77014 PR  CT GUIDANCE PLACEMENT RAD THERAPY FIELDS: ICD-10-PCS | Mod: 26,,, | Performed by: STUDENT IN AN ORGANIZED HEALTH CARE EDUCATION/TRAINING PROGRAM

## 2023-01-23 PROCEDURE — 77014 HC CT GUIDANCE RADIATION THERAPY FLDS PLACEMENT: CPT | Mod: TC,Q1 | Performed by: STUDENT IN AN ORGANIZED HEALTH CARE EDUCATION/TRAINING PROGRAM

## 2023-01-23 PROCEDURE — 77386 HC IMRT, COMPLEX: CPT | Mod: Q1 | Performed by: STUDENT IN AN ORGANIZED HEALTH CARE EDUCATION/TRAINING PROGRAM

## 2023-01-23 PROCEDURE — 77014 PR  CT GUIDANCE PLACEMENT RAD THERAPY FIELDS: CPT | Mod: 26,,, | Performed by: STUDENT IN AN ORGANIZED HEALTH CARE EDUCATION/TRAINING PROGRAM

## 2023-01-23 NOTE — PLAN OF CARE
Day 15 of outpatient radiation to esophagus. Overall doing well. Some nausea, no vomiting. Odynophagia, tolerating most foods, ensure, and smooties. Weight stable 257.5.

## 2023-01-23 NOTE — TELEPHONE ENCOUNTER
----- Message from Carol Warner, RN sent at 1/23/2023  2:09 PM CST -----  Good Afternoon Dr Nick, Mr Qingr is luis manuel for his robotic esophagectomy on Tuesday 3/14/23. Per Dr Brown, prior to pt sx date pt will need an echogram and a surgical clearance note from your office. Can pt hold his Xarelto 2 days before his sx date?     Thank you  Carol Warner BSN RN CCR  RN Navigator   Upper GI/Hepatobiliary Surgical Oncology  [facility] [address] [place] [address]  O [phone]  F [phone]

## 2023-01-24 ENCOUNTER — PATIENT MESSAGE (OUTPATIENT)
Dept: CARDIOLOGY | Facility: CLINIC | Age: 68
End: 2023-01-24
Payer: MEDICARE

## 2023-01-24 PROCEDURE — 77014 HC CT GUIDANCE RADIATION THERAPY FLDS PLACEMENT: CPT | Mod: TC | Performed by: STUDENT IN AN ORGANIZED HEALTH CARE EDUCATION/TRAINING PROGRAM

## 2023-01-24 PROCEDURE — 77014 PR  CT GUIDANCE PLACEMENT RAD THERAPY FIELDS: CPT | Mod: 26,,, | Performed by: STUDENT IN AN ORGANIZED HEALTH CARE EDUCATION/TRAINING PROGRAM

## 2023-01-24 PROCEDURE — 77014 PR  CT GUIDANCE PLACEMENT RAD THERAPY FIELDS: ICD-10-PCS | Mod: 26,,, | Performed by: STUDENT IN AN ORGANIZED HEALTH CARE EDUCATION/TRAINING PROGRAM

## 2023-01-24 PROCEDURE — 77386 HC IMRT, COMPLEX: CPT | Mod: Q1 | Performed by: STUDENT IN AN ORGANIZED HEALTH CARE EDUCATION/TRAINING PROGRAM

## 2023-01-24 NOTE — TELEPHONE ENCOUNTER
Patient has not had palpitations recently. Cleared for surgery and anesthesia. Moderate risk of MACE in the perioperative period. Patient instructed to hold Xarelto for 3 days prior to surgery and resume as soon as possible after surgery.    LV function had normalized. LVEF on last echo <1 yr ago was 60%. I see no need for a repeat echo.

## 2023-01-25 ENCOUNTER — PATIENT MESSAGE (OUTPATIENT)
Dept: SURGERY | Facility: CLINIC | Age: 68
End: 2023-01-25
Payer: MEDICARE

## 2023-01-25 PROCEDURE — 77014 PR  CT GUIDANCE PLACEMENT RAD THERAPY FIELDS: CPT | Mod: 26,,, | Performed by: STUDENT IN AN ORGANIZED HEALTH CARE EDUCATION/TRAINING PROGRAM

## 2023-01-25 PROCEDURE — 77014 PR  CT GUIDANCE PLACEMENT RAD THERAPY FIELDS: ICD-10-PCS | Mod: 26,,, | Performed by: STUDENT IN AN ORGANIZED HEALTH CARE EDUCATION/TRAINING PROGRAM

## 2023-01-25 PROCEDURE — 77014 HC CT GUIDANCE RADIATION THERAPY FLDS PLACEMENT: CPT | Mod: TC,Q1 | Performed by: STUDENT IN AN ORGANIZED HEALTH CARE EDUCATION/TRAINING PROGRAM

## 2023-01-25 PROCEDURE — 77386 HC IMRT, COMPLEX: CPT | Mod: Q1 | Performed by: STUDENT IN AN ORGANIZED HEALTH CARE EDUCATION/TRAINING PROGRAM

## 2023-01-26 ENCOUNTER — PATIENT MESSAGE (OUTPATIENT)
Dept: HEMATOLOGY/ONCOLOGY | Facility: CLINIC | Age: 68
End: 2023-01-26
Payer: MEDICARE

## 2023-01-26 DIAGNOSIS — Z00.6 CLINICAL TRIAL PARTICIPANT: ICD-10-CM

## 2023-01-26 DIAGNOSIS — C15.9 ESOPHAGEAL ADENOCARCINOMA: Primary | ICD-10-CM

## 2023-01-26 PROCEDURE — 77014 PR  CT GUIDANCE PLACEMENT RAD THERAPY FIELDS: ICD-10-PCS | Mod: 26,,, | Performed by: STUDENT IN AN ORGANIZED HEALTH CARE EDUCATION/TRAINING PROGRAM

## 2023-01-26 PROCEDURE — 77014 PR  CT GUIDANCE PLACEMENT RAD THERAPY FIELDS: CPT | Mod: 26,,, | Performed by: STUDENT IN AN ORGANIZED HEALTH CARE EDUCATION/TRAINING PROGRAM

## 2023-01-26 PROCEDURE — 77014 HC CT GUIDANCE RADIATION THERAPY FLDS PLACEMENT: CPT | Mod: TC,Q1 | Performed by: STUDENT IN AN ORGANIZED HEALTH CARE EDUCATION/TRAINING PROGRAM

## 2023-01-26 PROCEDURE — 77386 HC IMRT, COMPLEX: CPT | Performed by: STUDENT IN AN ORGANIZED HEALTH CARE EDUCATION/TRAINING PROGRAM

## 2023-01-27 ENCOUNTER — DOCUMENTATION ONLY (OUTPATIENT)
Dept: RADIATION ONCOLOGY | Facility: CLINIC | Age: 68
End: 2023-01-27
Payer: MEDICARE

## 2023-01-27 ENCOUNTER — OFFICE VISIT (OUTPATIENT)
Dept: HEMATOLOGY/ONCOLOGY | Facility: CLINIC | Age: 68
End: 2023-01-27
Payer: MEDICARE

## 2023-01-27 VITALS
HEIGHT: 73 IN | WEIGHT: 252 LBS | HEART RATE: 92 BPM | BODY MASS INDEX: 33.4 KG/M2 | RESPIRATION RATE: 18 BRPM | DIASTOLIC BLOOD PRESSURE: 70 MMHG | TEMPERATURE: 99 F | OXYGEN SATURATION: 98 % | SYSTOLIC BLOOD PRESSURE: 109 MMHG

## 2023-01-27 DIAGNOSIS — I50.20 HFREF (HEART FAILURE WITH REDUCED EJECTION FRACTION): ICD-10-CM

## 2023-01-27 DIAGNOSIS — I15.2 HYPERTENSION ASSOCIATED WITH DIABETES: ICD-10-CM

## 2023-01-27 DIAGNOSIS — E11.59 HYPERTENSION ASSOCIATED WITH DIABETES: ICD-10-CM

## 2023-01-27 DIAGNOSIS — D69.6 THROMBOCYTOPENIA: ICD-10-CM

## 2023-01-27 DIAGNOSIS — E11.69 HYPERLIPIDEMIA ASSOCIATED WITH TYPE 2 DIABETES MELLITUS: ICD-10-CM

## 2023-01-27 DIAGNOSIS — E11.22 CKD STAGE 3 DUE TO TYPE 2 DIABETES MELLITUS: ICD-10-CM

## 2023-01-27 DIAGNOSIS — C15.9 ESOPHAGEAL ADENOCARCINOMA: Primary | ICD-10-CM

## 2023-01-27 DIAGNOSIS — N18.30 CKD STAGE 3 DUE TO TYPE 2 DIABETES MELLITUS: ICD-10-CM

## 2023-01-27 DIAGNOSIS — I25.10 CORONARY ARTERY DISEASE INVOLVING NATIVE CORONARY ARTERY OF NATIVE HEART WITHOUT ANGINA PECTORIS: ICD-10-CM

## 2023-01-27 DIAGNOSIS — E11.22 TYPE 2 DIABETES MELLITUS WITH STAGE 3 CHRONIC KIDNEY DISEASE, WITHOUT LONG-TERM CURRENT USE OF INSULIN, UNSPECIFIED WHETHER STAGE 3A OR 3B CKD: ICD-10-CM

## 2023-01-27 DIAGNOSIS — I48.0 PAROXYSMAL ATRIAL FIBRILLATION: ICD-10-CM

## 2023-01-27 DIAGNOSIS — N18.30 TYPE 2 DIABETES MELLITUS WITH STAGE 3 CHRONIC KIDNEY DISEASE, WITHOUT LONG-TERM CURRENT USE OF INSULIN, UNSPECIFIED WHETHER STAGE 3A OR 3B CKD: ICD-10-CM

## 2023-01-27 DIAGNOSIS — E78.5 HYPERLIPIDEMIA ASSOCIATED WITH TYPE 2 DIABETES MELLITUS: ICD-10-CM

## 2023-01-27 PROCEDURE — 77014 PR  CT GUIDANCE PLACEMENT RAD THERAPY FIELDS: CPT | Mod: 26,,, | Performed by: STUDENT IN AN ORGANIZED HEALTH CARE EDUCATION/TRAINING PROGRAM

## 2023-01-27 PROCEDURE — 99999 PR PBB SHADOW E&M-EST. PATIENT-LVL III: ICD-10-PCS | Mod: PBBFAC,,, | Performed by: INTERNAL MEDICINE

## 2023-01-27 PROCEDURE — 99214 OFFICE O/P EST MOD 30 MIN: CPT | Mod: Q1,S$PBB,, | Performed by: INTERNAL MEDICINE

## 2023-01-27 PROCEDURE — 77386 HC IMRT, COMPLEX: CPT | Performed by: STUDENT IN AN ORGANIZED HEALTH CARE EDUCATION/TRAINING PROGRAM

## 2023-01-27 PROCEDURE — 99213 OFFICE O/P EST LOW 20 MIN: CPT | Mod: PBBFAC | Performed by: INTERNAL MEDICINE

## 2023-01-27 PROCEDURE — 77014 HC CT GUIDANCE RADIATION THERAPY FLDS PLACEMENT: CPT | Mod: TC,Q1 | Performed by: STUDENT IN AN ORGANIZED HEALTH CARE EDUCATION/TRAINING PROGRAM

## 2023-01-27 PROCEDURE — 99999 PR PBB SHADOW E&M-EST. PATIENT-LVL III: CPT | Mod: PBBFAC,,, | Performed by: INTERNAL MEDICINE

## 2023-01-27 PROCEDURE — 77014 PR  CT GUIDANCE PLACEMENT RAD THERAPY FIELDS: ICD-10-PCS | Mod: 26,,, | Performed by: STUDENT IN AN ORGANIZED HEALTH CARE EDUCATION/TRAINING PROGRAM

## 2023-01-27 PROCEDURE — 99214 PR OFFICE/OUTPT VISIT, EST, LEVL IV, 30-39 MIN: ICD-10-PCS | Mod: Q1,S$PBB,, | Performed by: INTERNAL MEDICINE

## 2023-01-27 NOTE — PROGRESS NOTES
MEDICAL ONCOLOGY - ESTABLISHED PATIENT VISIT    Reason for visit: esophageal cancer    Best Contact Phone Number(s): There are no phone numbers on file.     Cancer/Stage/TNM:    Cancer Staging   Esophageal adenocarcinoma  Staging form: Esophagus - Adenocarcinoma, AJCC 8th Edition  - Clinical stage from 11/17/2022: Stage III (cT3, cN0, cM0) - Signed by Javier Moulton MD on 12/14/2022       Oncology History   Esophageal adenocarcinoma   11/17/2022 Cancer Staged    Staging form: Esophagus - Adenocarcinoma, AJCC 8th Edition  - Clinical stage from 11/17/2022: Stage III (cT3, cN0, cM0)       12/8/2022 Initial Diagnosis    Esophageal adenocarcinoma     12/21/2022 - 12/21/2022 Chemotherapy    Treatment Summary   Plan Name: OP ESOPHAGEAL PACLITAXEL CARBOPLATIN WEEKLY  Treatment Goal: Curative  Status: Inactive  Start Date:   End Date:   Provider: Miki Medrano MD  Chemotherapy: CARBOplatin (PARAPLATIN) in sodium chloride 0.9% 250 mL chemo infusion, , Intravenous, Clinic/HOD 1 time, 0 of 1 cycle  PACLitaxeL (TAXOL) 50 mg/m2 = 120 mg in sodium chloride 0.9% 250 mL chemo infusion, 50 mg/m2, Intravenous, Clinic/HOD 1 time, 0 of 1 cycle       12/29/2022 -  Chemotherapy    Treatment Summary   Plan Name: [facility] [id number] ARM B CARBOPLATIN PACLITAXEL NIVOLUMAB  Treatment Goal: Curative  Status: Active  Start Date: 12/29/2022  End Date: 1/27/2023 (Planned)  Provider: Miki Medrano MD  Chemotherapy: CARBOplatin (PARAPLATIN) 215 mg in sodium chloride 0.9% 306.5 mL chemo infusion, 215 mg, Intravenous, Clinic/HOD 1 time, 4 of 5 cycles  Administration: 215 mg (12/29/2022), 230 mg (1/5/2023), 265 mg (1/13/2023), 265 mg (1/20/2023)  PACLitaxeL (TAXOL) 50 mg/m2 = 120 mg in sodium chloride 0.9% 250 mL chemo infusion, 50 mg/m2 = 120 mg, Intravenous, Clinic/HOD 1 time, 4 of 5 cycles  Dose modification: 50 mg/m2 (original dose 50 mg/m2, Cycle 4)  Administration: 120 mg (12/29/2022), 120 mg (1/5/2023), 120 mg (1/13/2023), 120 mg  (1/20/2023)            Interim History:     Mr. Person returns to clinic today prior to 5th weekly dose of carbo/Taxol as part of his neoadjuvant chemoradiation. Complains of indigestion which has occurred a couple times over the last week.  It lasted 1-2 hours each episode.  Takes omeprazole daily but did not take anything during this occurrence.  Continues to eat well.  Has some improvement in his dysphagia but odynophagia occurring more proximally has bothered him.  Also has a moderately pruritic left arm rash over the past 10 days or so.  Has applied some Cerave but no other topical treatments.    No other new complaints.  No dyspnea or diarrhea.      ROS:   Review of Systems   Constitutional:  Negative for appetite change, chills, fatigue, fever and unexpected weight change.   HENT:  Positive for trouble swallowing. Negative for mouth sores and voice change.    Eyes:  Negative for visual disturbance.   Respiratory:  Positive for cough (chronic; associates with ACEi). Negative for shortness of breath and wheezing.    Cardiovascular:  Negative for chest pain, palpitations and leg swelling.   Gastrointestinal:  Positive for diarrhea (Up to 2 episodes per day). Negative for abdominal pain, blood in stool, constipation, nausea, reflux (on omeprazole) and fecal incontinence.   Genitourinary:  Negative for bladder incontinence, dysuria, frequency, hematuria and urgency.   Musculoskeletal:  Negative for arthralgias, back pain, gait problem, leg pain, myalgias and neck pain.   Integumentary:  Positive for rash. Negative for wound.   Neurological:  Negative for dizziness, facial asymmetry, weakness, light-headedness, headaches, coordination difficulties, memory loss and coordination difficulties.   Hematological:  Does not bruise/bleed easily.   Psychiatric/Behavioral:  Negative for agitation, behavioral problems, confusion and sleep disturbance. The patient is not nervous/anxious.        Past Medical History:   Past  Medical History:   Diagnosis Date    Anticoagulant long-term use     Diabetes mellitus     Onset late 50s/early 60s    Hyperlipidemia     Hypertension     Onset late 50s/early 60s    Kidney stones     Sleep apnea     since 2006        Past Surgical History:   Past Surgical History:   Procedure Laterality Date    CORONARY ANGIOGRAPHY N/A 9/30/2020    Procedure: ANGIOGRAM, CORONARY ARTERY;  Surgeon: John West MD;  Location: [facility] CATH LAB;  Service: Cardiology;  Laterality: N/A;    CYSTOSCOPY N/A 2/17/2022    Procedure: CYSTOSCOPY;  Surgeon: Lukasz Hughes MD;  Location: [facility] OR [address] [place] [address];  Service: Urology;  Laterality: N/A;    CYSTOSCOPY W/ URETERAL STENT PLACEMENT N/A 2/25/2022    Procedure: CYSTOSCOPY, WITH URETERAL STENT INSERTION;  Surgeon: Lukasz Hughes MD;  Location: [address];  Service: Urology;  Laterality: N/A;    ENDOSCOPIC ULTRASOUND OF UPPER GASTROINTESTINAL TRACT N/A 12/7/2022    Procedure: ULTRASOUND, UPPER GI TRACT, ENDOSCOPIC;  Surgeon: Darian Main MD;  [facility];  Service: Endoscopy;  Laterality: N/A;  Approval to hold Xarelto rec'd from Dr. Nick (see t/e 12/5/22)-DS    ESOPHAGOGASTRODUODENOSCOPY N/A 11/17/2022    Procedure: EGD (ESOPHAGOGASTRODUODENOSCOPY);  Surgeon: Brody Gonzales MD;  Location: [facility] ([address] [place] [address]);  Service: Endoscopy;  Laterality: N/A;  inst via email-ok to hold Xarelto x 2 days-MS    LASER LITHOTRIPSY Left 2/25/2022    Procedure: LITHOTRIPSY, USING LASER;  Surgeon: Lukasz Hughes MD;  Location: [address];  Service: Urology;  Laterality: Left;    LEFT HEART CATHETERIZATION Left 9/30/2020    Procedure: Left heart cath;  Surgeon: John West MD;  Location: [facility] CATH LAB;  Service: Cardiology;  Laterality: Left;    LITHOTRIPSY      PARATHYROIDECTOMY  1/1/2-107    PYELOSCOPY Left 2/25/2022    Procedure: PYELOSCOPY;  Surgeon: Lukasz Hughes MD;  Location: [address];  Service: Urology;  Laterality: Left;     TRANSESOPHAGEAL ECHOCARDIOGRAPHY N/A 2022    Procedure: ECHOCARDIOGRAM, TRANSESOPHAGEAL;  Surgeon: Xander Diagnostic Provider;  Location: [facility] EP LAB;  Service: Cardiology;  Laterality: N/A;    TREATMENT OF CARDIAC ARRHYTHMIA N/A 2022    Procedure: Cardioversion or Defibrillation;  Surgeon: Iris Fisher NP;  Location: [facility] EP LAB;  Service: Cardiology;  Laterality: N/A;  afib, dccv, artie, anes, EH, 3prep    URETEROSCOPIC REMOVAL OF URETERIC CALCULUS Left 2022    Procedure: REMOVAL, CALCULUS, URETER, URETEROSCOPIC;  Surgeon: Lukasz Hughes MD;  Location: [facility] OR [address] [place] [address];  Service: Urology;  Laterality: Left;    URETEROSCOPY Left 2022    Procedure: URETEROSCOPY;  Surgeon: Lukasz Hughes MD;  Location: [facility] OR [address] [place] [address];  Service: Urology;  Laterality: Left;        Family History:   Family History   Problem Relation Age of Onset    Arthritis Mother     Heart disease Father 70        CABG    Arthritis Father     Diabetes Father     Kidney disease Father         had one kidney removed in early thirties    Arthritis Sister     Arthritis Sister     Hypertension Sister     Colon cancer Brother 32    Arthritis Brother     Alcohol abuse Paternal Aunt     Cancer Maternal Grandfather         throat cancer    Diabetes Paternal Grandmother     Colon polyps Paternal Grandfather     Colon cancer Paternal Grandfather     Cancer Paternal Grandfather         colon cancer at age 62        Social History:   Social History     Tobacco Use    Smoking status: Former     Packs/day: 1.00     Years: 7.00     Pack years: 7.00     Types: Cigarettes, Cigars     Start date: 1972     Quit date:      Years since quittin.7     Passive exposure: Never    Smokeless tobacco: Never    Tobacco comments:     smoking was off and on.  cumulative 7 years.  never more than three years in one stretch or more lj   Substance Use Topics    Alcohol use: Yes     Alcohol/week: 3.0 standard drinks     Types: 2 Cans of beer, 1  Shots of liquor per week     Comment: don't drink regularly.  6 to 7 monthly      I have reviewed and updated the patient's past medical, surgical, family and social histories.    Allergies:   Review of patient's allergies indicates:  No Known Allergies     Medications:   Current Outpatient Medications   Medication Sig Dispense Refill    ACCU-CHEK SOFTCLIX LANCETS Misc USE EVERY  each 6    allopurinoL (ZYLOPRIM) 100 MG tablet TAKE 1 TABLET BY MOUTH EVERY DAY 30 tablet 10    blood sugar diagnostic (ACCU-CHEK ANTONELLA PLUS TEST STRP) Strp 1 strip by Misc.(Non-Drug; Combo Route) route 3 (three) times daily before meals. 100 strip 11    blood-glucose meter kit Checks blood sugars 1x/daily. 1 each 12    glimepiride (AMARYL) 2 MG tablet TAKE 2 TABLETS BY MOUTH EVERY MORNING 180 tablet 2    lisinopriL-hydrochlorothiazide (PRINZIDE,ZESTORETIC) 20-25 mg Tab TAKE 1 TABLET BY MOUTH EVERY DAY 90 tablet 3    metFORMIN (GLUCOPHAGE) 500 MG tablet TAKE 2 TABLETS BY MOUTH EVERY MORNING AND 2 TABLETS BY MOUTH WITH DINNER IN EVENING 360 tablet 3    metoprolol succinate (TOPROL-XL) 25 MG 24 hr tablet Take 1 tablet (25 mg total) by mouth once daily. 30 tablet 11    nitroGLYCERIN (NITROSTAT) 0.4 MG SL tablet Place 1 tablet (0.4 mg total) under the tongue every 5 (five) minutes as needed for Chest pain. If you need a third tablet, call 911 60 tablet 12    omeprazole (PRILOSEC) 40 MG capsule Take 1 capsule (40 mg total) by mouth once daily. 90 capsule 3    potassium citrate (UROCIT-K) 10 mEq (1,080 mg) TbSR Take 1 tablet (10 mEq total) by mouth 3 (three) times daily with meals. 90 tablet 9    rivaroxaban (XARELTO) 20 mg Tab Take 1 tablet (20 mg total) by mouth daily with dinner or evening meal. 30 tablet 11    rosuvastatin (CRESTOR) 20 MG tablet TAKE 1 TABLET(20 MG) BY MOUTH EVERY DAY 30 tablet 11    tamsulosin (FLOMAX) 0.4 mg Cap Take 1 capsule (0.4 mg total) by mouth once daily. 30 capsule 11    testosterone (ANDROGEL) 20.25 mg/1.25  "gram (1.62 %) GlPm Apply 4 pumps to shoulders daily 2 each 5    diphenhydrAMINE-aluminum-magnesium hydroxide-simethicone-LIDOcaine HCl 2% Swish and swallow 10 mLs every 6 (six) hours as needed (pain swallowing). 360 each 0    magic mouthwash diphen/antac/lidoc Swish and swallow 10 ML's every 6 hours as needed (pain swallowing) 360 mL 0     No current facility-administered medications for this visit.        Physical Exam:   /70 (BP Location: Left arm, Patient Position: Sitting, BP Method: Large (Automatic))   Pulse 92   Temp 99.1 °F (37.3 °C) (Oral)   Resp 18   Ht 6' 1" (1.854 m)   Wt 114.3 kg (251 lb 15.8 oz)   SpO2 98%   BMI 33.25 kg/m²      ECOG Performance status: 0    Physical Exam  Vitals reviewed.   Constitutional:       General: He is not in acute distress.     Appearance: Normal appearance. He is not ill-appearing, toxic-appearing or diaphoretic.   HENT:      Head: Normocephalic and atraumatic.   Eyes:      General: No scleral icterus.     Extraocular Movements: Extraocular movements intact.      Conjunctiva/sclera: Conjunctivae normal.      Pupils: Pupils are equal, round, and reactive to light.   Cardiovascular:      Rate and Rhythm: Normal rate and regular rhythm.      Heart sounds: Normal heart sounds. No murmur heard.  Pulmonary:      Effort: Pulmonary effort is normal. No respiratory distress.      Breath sounds: Normal breath sounds. No wheezing or rales.   Abdominal:      General: Bowel sounds are normal. There is no distension.      Palpations: Abdomen is soft.      Tenderness: There is no abdominal tenderness.   Musculoskeletal:         General: No swelling.      Cervical back: Normal range of motion.   Lymphadenopathy:      Cervical: No cervical adenopathy.   Skin:     Coloration: Skin is not jaundiced.      Findings: Rash (Maculopapular rash over left forearm) present. No bruising or erythema.   Neurological:      General: No focal deficit present.      Mental Status: He is alert and " oriented to person, place, and time. Mental status is at baseline.      Cranial Nerves: No cranial nerve deficit.      Motor: No weakness.      Gait: Gait normal.   Psychiatric:         Mood and Affect: Mood normal.         Behavior: Behavior normal.         Thought Content: Thought content normal.         Judgment: Judgment normal.       Labs:   No results found for this or any previous visit (from the past 48 hour(s)).       I have reviewed the pertinent labs from 23 which are notable for thrombocytopenia, leuokopenia.  Cr 1.1, normal LFTs.    Imagin2022 - EUS  Impression:             - Esophageal mucosal changes consistent with Paredes's esophagus.   - Partially obstructing, malignant esophageal tumor was found in the lower third of the esophagus.   - Normal stomach.   - Normal examined duodenum.   - A mass was found in the lower third of the esophagus. A tissue diagnosis was obtained prior to this exam. This is of adenocarcinoma. This was staged T3 (based on invasion into) N0 Mx by endosonographic criteria.   - No specimens collected.     2022 - PET CT   Impression:  - Distal esophageal wall thickening and hypermetabolic activity in keeping with known esophageal cancer.  - No definite evidence of metastatic disease.  Subcentimeter gastrohepatic and perigastric lymph nodes without appreciable uptake.    I have personally reviewed the imaging which is notable for mass in distal esophagus without evidence of distant metastatic disease.    Path:     Final Pathologic Diagnosis  Abnormal   Gastroesophageal junction, biopsy:   - Adenocarcinoma.   - Background intestinal metaplasia with low and high-grade dysplasia.   - Focally suspicious for lymphovascular invasion.   - MMR pending, results will be issued in addendum.   - See comment.   COMMENT:  The biopsy shows adenocarcinoma with single cell infiltration of   the lamina propria highlighted on cytokeratin stain arising in a background   of  intestinal metaplasia with low and high-grade dysplasia.  There is an area   suspicious for lymphovascular invasion on routine H&E sections however this   area is not confirmed on levels or immunostain for CD 34.            Assessment:       1. Esophageal adenocarcinoma    2. Thrombocytopenia    3. Type 2 diabetes mellitus with stage 3 chronic kidney disease, without long-term current use of insulin, unspecified whether stage 3a or 3b CKD    4. CKD stage 3 due to type 2 diabetes mellitus    5. Hypertension associated with diabetes    6. Hyperlipidemia associated with type 2 diabetes mellitus    7. Paroxysmal atrial fibrillation    8. Coronary artery disease involving native coronary artery of native heart without angina pectoris    9. HFrEF (heart failure with reduced ejection fraction)            Plan:     # Esophageal adenocarcinoma   Mr. Person is a pleasant 67 year old male who presents to our clinic for management of recently diagnosed esophageal cancer. He initially presented with dysphagia, and further workup confirmed a stage II adenocarcinoma. Tumor staged T3N0Mx by endosonographic criteria, JEVON. CT CAP on 12/14/22 confirmed no evidence of metastatic disease.    Previously conversation regarding his diagnosis and treatment options.  Recommended perioperative chemo/radiation per the CROSS trial. Discussed chemoradiation for ~5 weeks with 5 doses of weekly carboplatin and taxol administered. Plan to obtain restaging scans 4-5 weeks after radiation completed.     He was a candidate for a cooperative group trial assessing perioperative immunotherapy treatment. He consented for this study - ECOG-ACRIN [id number]: A Phase II/III Study of Dilcia-operative Nivolumab and Ipilimumab in Patients with Locoregional Esophageal and Gastroesophageal Junction Adenocarcinoma    Previously met with Dr. Santana Brown who agreed he was a surgical candidate.  Previously met with Dr. Javier Moulton who will be treating him with  radiation.    Began cycle 1 carboplatin/paclitaxel/nivolumab on trial on 12/29/22.  Presents today for cycle 5 of weekly carboplatin + paclitaxel.  Will hold chemotherapy because of thrombocytopenia per protocol.  Because his final day of radiation is 2/1, will hold off on further chemotherapy cycles.  This was discussed with him in clinic today.    Continue XRT with Dr. Moulton.  He will RTC in one month with restaging imaging.    # HTN, HLD, DM, CKD  Following with PCP Dr. Wilson and nephrologist Dr. Oconnell.   BP normal today.     Creatinine 1.1 on most recent labs.   Continue medical management.   Monitor on treatment.     # CAD, A fib, CHF  Following with cardiologist Dr. Nick.   Currently asymptomatic.   On Xarelto.   Continue medical management.     Follow up: one month after restaging imaging.    Sumanth Reed MD  Hematology/Oncology  [facility]      Route Chart for Scheduling    Med Onc Chart Routing      Follow up with physician . Per research - Maria Esther   Follow up with SAMUEL    Infusion scheduling note    Injection scheduling note    Labs    Imaging    Pharmacy appointment    Other referrals        Treatment Plan Information   [facility] [id number] ARM B CARBOPLATIN PACLITAXEL NIVOLUMAB   Miki Medrano MD   Upcoming Treatment Dates - [facility] [id number] ARM B CARBOPLATIN PACLITAXEL NIVOLUMAB    1/27/2023       Pre-Medications       palonosetron (ALOXI) 0.25 mg with Dexamethasone (DECADRON) 12 mg in NS 50 mL IVPB       famotidine (PF) injection 20 mg       diphenhydrAMINE (BENADRYL) 50 mg in sodium chloride 0.9% 50 mL IVPB       Chemotherapy       CARBOplatin (PARAPLATIN) 215 mg in sodium chloride 0.9% 271.5 mL chemo infusion       PACLitaxeL (TAXOL) 50 mg/m2 = 120 mg in sodium chloride 0.9% 250 mL chemo infusion

## 2023-01-27 NOTE — PLAN OF CARE
Day 20 of outpatient radiation to esophagus. Still with dysphagia and odynophagia. Tolerating soft foods. Some nausea no vomiting.

## 2023-01-30 ENCOUNTER — OFFICE VISIT (OUTPATIENT)
Dept: SURGERY | Facility: CLINIC | Age: 68
End: 2023-01-30
Payer: MEDICARE

## 2023-01-30 VITALS
DIASTOLIC BLOOD PRESSURE: 56 MMHG | BODY MASS INDEX: 33.17 KG/M2 | WEIGHT: 251.38 LBS | TEMPERATURE: 99 F | SYSTOLIC BLOOD PRESSURE: 123 MMHG | HEART RATE: 87 BPM | OXYGEN SATURATION: 95 %

## 2023-01-30 DIAGNOSIS — N18.30 TYPE 2 DIABETES MELLITUS WITH STAGE 3 CHRONIC KIDNEY DISEASE, WITHOUT LONG-TERM CURRENT USE OF INSULIN, UNSPECIFIED WHETHER STAGE 3A OR 3B CKD: ICD-10-CM

## 2023-01-30 DIAGNOSIS — C15.9 ESOPHAGEAL ADENOCARCINOMA: Primary | ICD-10-CM

## 2023-01-30 DIAGNOSIS — E11.22 TYPE 2 DIABETES MELLITUS WITH STAGE 3 CHRONIC KIDNEY DISEASE, WITHOUT LONG-TERM CURRENT USE OF INSULIN, UNSPECIFIED WHETHER STAGE 3A OR 3B CKD: ICD-10-CM

## 2023-01-30 PROCEDURE — 77386 HC IMRT, COMPLEX: CPT | Mod: Q1 | Performed by: STUDENT IN AN ORGANIZED HEALTH CARE EDUCATION/TRAINING PROGRAM

## 2023-01-30 PROCEDURE — 99214 OFFICE O/P EST MOD 30 MIN: CPT | Mod: S$PBB,,, | Performed by: STUDENT IN AN ORGANIZED HEALTH CARE EDUCATION/TRAINING PROGRAM

## 2023-01-30 PROCEDURE — 99999 PR PBB SHADOW E&M-EST. PATIENT-LVL IV: CPT | Mod: PBBFAC,,, | Performed by: STUDENT IN AN ORGANIZED HEALTH CARE EDUCATION/TRAINING PROGRAM

## 2023-01-30 PROCEDURE — 99214 OFFICE O/P EST MOD 30 MIN: CPT | Mod: PBBFAC,25 | Performed by: STUDENT IN AN ORGANIZED HEALTH CARE EDUCATION/TRAINING PROGRAM

## 2023-01-30 PROCEDURE — 99999 PR PBB SHADOW E&M-EST. PATIENT-LVL IV: ICD-10-PCS | Mod: PBBFAC,,, | Performed by: STUDENT IN AN ORGANIZED HEALTH CARE EDUCATION/TRAINING PROGRAM

## 2023-01-30 PROCEDURE — 77014 HC CT GUIDANCE RADIATION THERAPY FLDS PLACEMENT: CPT | Mod: TC,Q1 | Performed by: STUDENT IN AN ORGANIZED HEALTH CARE EDUCATION/TRAINING PROGRAM

## 2023-01-30 PROCEDURE — 77336 RADIATION PHYSICS CONSULT: CPT | Performed by: STUDENT IN AN ORGANIZED HEALTH CARE EDUCATION/TRAINING PROGRAM

## 2023-01-30 PROCEDURE — 77014 PR  CT GUIDANCE PLACEMENT RAD THERAPY FIELDS: ICD-10-PCS | Mod: 26,,, | Performed by: STUDENT IN AN ORGANIZED HEALTH CARE EDUCATION/TRAINING PROGRAM

## 2023-01-30 PROCEDURE — 77014 PR  CT GUIDANCE PLACEMENT RAD THERAPY FIELDS: CPT | Mod: 26,,, | Performed by: STUDENT IN AN ORGANIZED HEALTH CARE EDUCATION/TRAINING PROGRAM

## 2023-01-30 PROCEDURE — 99214 PR OFFICE/OUTPT VISIT, EST, LEVL IV, 30-39 MIN: ICD-10-PCS | Mod: S$PBB,,, | Performed by: STUDENT IN AN ORGANIZED HEALTH CARE EDUCATION/TRAINING PROGRAM

## 2023-01-30 NOTE — PROGRESS NOTES
Surgical Oncology Clinic Note  [facility]       Referring Provider: No ref. provider found   PCP: Kirk Wilson MD    Reason For Visit: No chief complaint on file.      Oncologic History:  8/2022: PCP for progressive dysphagia  11/17/22: EGD:nodular mucosa in the esophagus at the GE junction. Esophageal mucosal changes suggestive of short-segment Paredes's esophagus. Biopsies positive for adenocarcinoma.   12/7/22: EUS: Partially obstructing, malignant esophageal tumor was found in the lower third of the esophagus staged T3 N0 Mx by endosonographic criteria.   12/8/22: PET CT: Distal esophageal wall thickening and hypermetabolic activity in keeping with known esophageal cancer with no definite evidence of metastatic disease      History of Present Illness:  Mr. Person is a 67 y.o. male who presents with newly diagnosed esophageal adenocarcinoma of the GE junction. He reports that beginning in late July 2022 he noticed trouble swallowing certain foods- 8/2022 saw his PCP as this problem persisted. EGD noted a nodular mucosal change at  the GE junction. Biopsy taken revealed adenocarcinoma. Subsequent EUS noted a partially obstructing tumor in the lower third of the esophagus that was staged T3N0Mx by endosonographic criteria.      He reports that he is able to eat many food but avoids foods such as steak and breads as well as some liquids and carbonated beverages. He has lost 3-5 lbs since July. Reports usual activity and energy level. Denies N/V/D, chest pain, SOB.       Interval Since Last Visit:    1/30/2023: Lukasz Person returns today for follow-up after 4 cycles of neoadj chemo and radiation that began 12/29/22.  Final RT 2/1/23, last cycles of chemo held for thrombocytopenia.  Has been cleared by cardiology - does not need repeat echo (EF 60% <1 year ago) and to hold Xarelto 3 days prior.  Scheduled for PFTs 2/27/23.  Scheduled for re-staging imaging 3/1 and 3/2.     Doing well with chemoRT. Having  indigestion relieved with tums and hiccups with eating. Having odynophagia. Lost about 12lbs. Overall doing well with treatment.        Pathology:  11/17/22: From EGD:   Final Pathologic Diagnosis        Abnormal   Gastroesophageal junction, biopsy:   - Adenocarcinoma.   - Background intestinal metaplasia with low and high-grade dysplasia.   - Focally suspicious for lymphovascular invasion.   - MMR pending, results will be issued in addendum.   Immunohistochemistry (IHC) Testing for Mismatch Repair (MMR) Proteins:   MLH1 - Intact nuclear expression   MSH2 - Intact nuclear expression   MSH6 - Intact nuclear expression   PMS2 - Intact nuclear expression   Background nonneoplastic tissue/internal control with intact nuclear   expression   IHC Interpretation   No loss of nuclear expression of MMR proteins: low probability of   microsatellite instability     Staging:  Cancer Staging   Esophageal adenocarcinoma  Staging form: Esophagus - Adenocarcinoma, AJCC 8th Edition  - Clinical stage from 11/17/2022: Stage III (cT3, cN0, cM0) - Signed by Javier Moulton MD on 12/14/2022         Current Outpatient Medications:     ACCU-CHEK SOFTCLIX LANCETS Misc, USE EVERY DAY, Disp: 100 each, Rfl: 6    allopurinoL (ZYLOPRIM) 100 MG tablet, TAKE 1 TABLET BY MOUTH EVERY DAY, Disp: 30 tablet, Rfl: 10    blood sugar diagnostic (ACCU-CHEK ANTONELLA PLUS TEST STRP) Strp, 1 strip by Misc.(Non-Drug; Combo Route) route 3 (three) times daily before meals., Disp: 100 strip, Rfl: 11    blood-glucose meter kit, Checks blood sugars 1x/daily., Disp: 1 each, Rfl: 12    glimepiride (AMARYL) 2 MG tablet, TAKE 2 TABLETS BY MOUTH EVERY MORNING, Disp: 180 tablet, Rfl: 2    lisinopriL-hydrochlorothiazide (PRINZIDE,ZESTORETIC) 20-25 mg Tab, TAKE 1 TABLET BY MOUTH EVERY DAY, Disp: 90 tablet, Rfl: 3    metFORMIN (GLUCOPHAGE) 500 MG tablet, TAKE 2 TABLETS BY MOUTH EVERY MORNING AND 2 TABLETS BY MOUTH WITH DINNER IN EVENING, Disp: 360 tablet, Rfl: 3     metoprolol succinate (TOPROL-XL) 25 MG 24 hr tablet, Take 1 tablet (25 mg total) by mouth once daily., Disp: 30 tablet, Rfl: 11    omeprazole (PRILOSEC) 40 MG capsule, Take 1 capsule (40 mg total) by mouth once daily., Disp: 90 capsule, Rfl: 3    potassium citrate (UROCIT-K) 10 mEq (1,080 mg) TbSR, Take 1 tablet (10 mEq total) by mouth 3 (three) times daily with meals., Disp: 90 tablet, Rfl: 9    rosuvastatin (CRESTOR) 20 MG tablet, TAKE 1 TABLET(20 MG) BY MOUTH EVERY DAY, Disp: 30 tablet, Rfl: 11    tamsulosin (FLOMAX) 0.4 mg Cap, Take 1 capsule (0.4 mg total) by mouth once daily., Disp: 30 capsule, Rfl: 11    testosterone (ANDROGEL) 20.25 mg/1.25 gram (1.62 %) GlPm, Apply 4 pumps to shoulders daily, Disp: 2 each, Rfl: 5    diphenhydrAMINE-aluminum-magnesium hydroxide-simethicone-LIDOcaine HCl 2%, Swish and swallow 10 mLs every 6 (six) hours as needed (pain swallowing). (Patient not taking: Reported on 1/30/2023), Disp: 360 each, Rfl: 0    magic mouthwash diphen/antac/lidoc, Swish and swallow 10 ML's every 6 hours as needed (pain swallowing) (Patient not taking: Reported on 1/30/2023), Disp: 360 mL, Rfl: 0    nitroGLYCERIN (NITROSTAT) 0.4 MG SL tablet, Place 1 tablet (0.4 mg total) under the tongue every 5 (five) minutes as needed for Chest pain. If you need a third tablet, call 911 (Patient not taking: Reported on 1/30/2023), Disp: 60 tablet, Rfl: 12    rivaroxaban (XARELTO) 20 mg Tab, Take 1 tablet (20 mg total) by mouth daily with dinner or evening meal. (Patient not taking: Reported on 1/30/2023), Disp: 30 tablet, Rfl: 11    Review of patient's allergies indicates:  No Known Allergies    Past Medical History:   Diagnosis Date    Anticoagulant long-term use     Diabetes mellitus     Onset late 50s/early 60s    Hyperlipidemia     Hypertension     Onset late 50s/early 60s    Kidney stones     Sleep apnea     since 2006       Past Surgical History:   Procedure Laterality Date    CORONARY ANGIOGRAPHY N/A 9/30/2020     Procedure: ANGIOGRAM, CORONARY ARTERY;  Surgeon: John West MD;  Location: [facility] CATH LAB;  Service: Cardiology;  Laterality: N/A;    CYSTOSCOPY N/A 2/17/2022    Procedure: CYSTOSCOPY;  Surgeon: Lukasz Hughes MD;  Location: [facility] OR [facility] FLR;  Service: Urology;  Laterality: N/A;    CYSTOSCOPY W/ URETERAL STENT PLACEMENT N/A 2/25/2022    Procedure: CYSTOSCOPY, WITH URETERAL STENT INSERTION;  Surgeon: Lukasz Hughes MD;  [facility];  Service: Urology;  Laterality: N/A;    ENDOSCOPIC ULTRASOUND OF UPPER GASTROINTESTINAL TRACT N/A 12/7/2022    Procedure: ULTRASOUND, UPPER GI TRACT, ENDOSCOPIC;  Surgeon: Darian Main MD;  Location: [facility] ENDO;  Service: Endoscopy;  Laterality: N/A;  Approval to hold Xarelto rec'd from Dr. Nick (see t/e 12/5/22)-DS    ESOPHAGOGASTRODUODENOSCOPY N/A 11/17/2022    Procedure: EGD (ESOPHAGOGASTRODUODENOSCOPY);  Surgeon: Brody Gonzales MD;  Location: [facility] (2ND FLR);  Service: Endoscopy;  Laterality: N/A;  inst via email-ok to hold Xarelto x 2 days-MS    LASER LITHOTRIPSY Left 2/25/2022    Procedure: LITHOTRIPSY, USING LASER;  Surgeon: Lukasz Hughes MD;  Location: [facility] OR [address] [place] [address];  Service: Urology;  Laterality: Left;    LEFT HEART CATHETERIZATION Left 9/30/2020    Procedure: Left heart cath;  Surgeon: John West MD;  Location: [facility] CATH LAB;  Service: Cardiology;  Laterality: Left;    LITHOTRIPSY      PARATHYROIDECTOMY  1/1/2-107    PYELOSCOPY Left 2/25/2022    Procedure: PYELOSCOPY;  Surgeon: Lukasz Hughes MD;  [facility];  Service: Urology;  Laterality: Left;    TRANSESOPHAGEAL ECHOCARDIOGRAPHY N/A 4/7/2022    Procedure: ECHOCARDIOGRAM, TRANSESOPHAGEAL;  Surgeon: Xander Diagnostic Provider;  Location: [facility] EP LAB;  Service: Cardiology;  Laterality: N/A;    TREATMENT OF CARDIAC ARRHYTHMIA N/A 4/7/2022    Procedure: Cardioversion or Defibrillation;  Surgeon: Iris Fisher NP;  Location: [facility] EP LAB;  Service:  Cardiology;  Laterality: N/A;  afib, dccv, artie, anes, EH, 3prep    URETEROSCOPIC REMOVAL OF URETERIC CALCULUS Left 2022    Procedure: REMOVAL, CALCULUS, URETER, URETEROSCOPIC;  Surgeon: Lukasz Hughes MD;  Location: [facility] OR [address] [place] [address];  Service: Urology;  Laterality: Left;    URETEROSCOPY Left 2022    Procedure: URETEROSCOPY;  Surgeon: Lukasz Hughes MD;  Location: [facility] OR [address] [place] [address];  Service: Urology;  Laterality: Left;       Family History   Problem Relation Age of Onset    Arthritis Mother     Heart disease Father 70        CABG    Arthritis Father     Diabetes Father     Kidney disease Father         had one kidney removed in early thirties    Arthritis Sister     Arthritis Sister     Hypertension Sister     Colon cancer Brother 32    Arthritis Brother     Alcohol abuse Paternal Aunt     Cancer Maternal Grandfather         throat cancer    Diabetes Paternal Grandmother     Colon polyps Paternal Grandfather     Colon cancer Paternal Grandfather     Cancer Paternal Grandfather         colon cancer at age 62       Social History     Socioeconomic History    Marital status:    Tobacco Use    Smoking status: Former     Packs/day: 1.00     Years: 7.00     Pack years: 7.00     Types: Cigarettes, Cigars     Start date: 1972     Quit date:      Years since quittin.7     Passive exposure: Never    Smokeless tobacco: Never    Tobacco comments:     smoking was off and on.  cumulative 7 years.  never more than three years in one stretch or more lj   Substance and Sexual Activity    Alcohol use: Yes     Alcohol/week: 3.0 standard drinks     Types: 2 Cans of beer, 1 Shots of liquor per week     Comment: don't drink regularly.  6 to 7 monthly    Drug use: Not Currently     Types: Marijuana    Sexual activity: Not Currently     Partners: Female     Birth control/protection: None     Comment:      Social Determinants of Health     Financial Resource Strain: Low Risk     Difficulty of  Paying Living Expenses: Not very hard   Food Insecurity: No Food Insecurity    Worried About Running Out of Food in the Last Year: Never true    Ran Out of Food in the Last Year: Never true   Transportation Needs: No Transportation Needs    Lack of Transportation (Medical): No    Lack of Transportation (Non-Medical): No   Physical Activity: Insufficiently Active    Days of Exercise per Week: 4 days    Minutes of Exercise per Session: 30 min   Stress: No Stress Concern Present    Feeling of Stress : Only a little   Social Connections: Unknown    Frequency of Communication with Friends and Family: Once a week    Frequency of Social Gatherings with Friends and Family: Once a week    Active Member of Clubs or Organizations: Yes    Attends Club or Organization Meetings: More than 4 times per year    Marital Status:    Housing Stability: Low Risk     Unable to Pay for Housing in the Last Year: No    Number of Places Lived in the Last Year: 1    Unstable Housing in the Last Year: No             Vitals:    01/30/23 0826   BP: (!) 123/56   Pulse: 87   Temp: 98.6 °F (37 °C)     Body mass index is 33.17 kg/m².  Physical Exam  Constitutional:       General: He is not in acute distress.     Appearance: He is not ill-appearing.   Cardiovascular:      Rate and Rhythm: Normal rate.   Pulmonary:      Effort: Pulmonary effort is normal. No respiratory distress.   Abdominal:      General: There is no distension.   Skin:     General: Skin is warm and dry.   Neurological:      General: No focal deficit present.      Mental Status: He is alert.   Psychiatric:         Mood and Affect: Mood normal.         Behavior: Behavior normal.          DATA REVIEW:  I reviewed the following records for this visit: lab work from prior visit, notes from other physicians, surgical pathology results, endoscopy results, radiographic study evaluation, and laboratory results done by primary care physician      Lab Results   Component Value Date     WBC 3.42 (L) 01/30/2023    HGB 13.1 (L) 01/30/2023    HCT 40.5 01/30/2023    PLT 87 (L) 01/30/2023    CHOL 107 (L) 11/07/2022    TRIG 218 (H) 11/07/2022    HDL 34 (L) 11/07/2022    LDLCALC 29.4 (L) 11/07/2022    ALT 30 01/30/2023    AST 31 01/30/2023     (L) 01/30/2023    K 4.6 01/30/2023     01/30/2023    CREATININE 1.3 01/30/2023    BUN 24 (H) 01/30/2023    CO2 22 (L) 01/30/2023    TSH 1.514 01/19/2023    PSA 1.2 10/06/2021    INR 1.1 04/01/2022    HGBA1C 6.9 (H) 11/07/2022       No results found for: CEA, , , AFP     Radiology:   Final Pathologic Diagnosis   Date/Time Value Ref Range Status   11/17/2022 08:54 AM (A)  Corrected    Gastroesophageal junction, biopsy:  - Adenocarcinoma.  - Background intestinal metaplasia with low and high-grade dysplasia.  - Focally suspicious for lymphovascular invasion.  - MMR pending, results will be issued in addendum.  - See comment.  COMMENT:  The biopsy shows adenocarcinoma with single cell infiltration of  the lamina propria highlighted on cytokeratin stain arising in a background  of intestinal metaplasia with low and high-grade dysplasia.  There is an area  suspicious for lymphovascular invasion on routine H&E sections however this  area is not confirmed on levels or immunostain for CD 34.  Case is reviewed  by Neva Camargo and RYAN Gauthier who agree with the above diagnosis.  Appropriate positive controls are examined.       Comment:     Interp By Idania Flores MD, Signed on 12/05/2022 at 11:05             ASSESSMENT & PLAN:  Lukasz Person is a 67 y.o. male with Siewert I esophageal adenocarcinoma, clinical stage T3N0M0.  Enrolled in clinical trial, finishing up chemoRT.  Overall doing well and tolerating treatment.  Moderate dysphagia and odynophagia, minimal weight loss. Discussed pre-op activity and healing prior to surgery on 3/14/23.    - obtain PFTs and restaging imaging in beginning of March  - to complete last radiation cycles in February    - robo esophagectomy scheduled 3/14/23. Consent to be obtained at next apt  - RTC after PFTs and restaging imaging prior to surgery       Follow-up: No follow-ups on file.       Iris Cole MD  General Surgery, PGY-1  [facility]

## 2023-01-31 PROCEDURE — 77014 PR  CT GUIDANCE PLACEMENT RAD THERAPY FIELDS: CPT | Mod: 26,,, | Performed by: STUDENT IN AN ORGANIZED HEALTH CARE EDUCATION/TRAINING PROGRAM

## 2023-01-31 PROCEDURE — 77386 HC IMRT, COMPLEX: CPT | Mod: Q1 | Performed by: STUDENT IN AN ORGANIZED HEALTH CARE EDUCATION/TRAINING PROGRAM

## 2023-01-31 PROCEDURE — 77014 HC CT GUIDANCE RADIATION THERAPY FLDS PLACEMENT: CPT | Mod: TC,Q1 | Performed by: STUDENT IN AN ORGANIZED HEALTH CARE EDUCATION/TRAINING PROGRAM

## 2023-01-31 PROCEDURE — 77014 PR  CT GUIDANCE PLACEMENT RAD THERAPY FIELDS: ICD-10-PCS | Mod: 26,,, | Performed by: STUDENT IN AN ORGANIZED HEALTH CARE EDUCATION/TRAINING PROGRAM

## 2023-02-01 ENCOUNTER — HOSPITAL ENCOUNTER (OUTPATIENT)
Dept: RADIATION THERAPY | Facility: HOSPITAL | Age: 68
Discharge: HOME | End: 2023-02-01
Attending: STUDENT IN AN ORGANIZED HEALTH CARE EDUCATION/TRAINING PROGRAM
Payer: MEDICARE

## 2023-02-01 PROCEDURE — 77014 HC CT GUIDANCE RADIATION THERAPY FLDS PLACEMENT: CPT | Mod: TC | Performed by: STUDENT IN AN ORGANIZED HEALTH CARE EDUCATION/TRAINING PROGRAM

## 2023-02-01 PROCEDURE — 77386 HC IMRT, COMPLEX: CPT | Performed by: STUDENT IN AN ORGANIZED HEALTH CARE EDUCATION/TRAINING PROGRAM

## 2023-02-01 PROCEDURE — 77014 PR  CT GUIDANCE PLACEMENT RAD THERAPY FIELDS: ICD-10-PCS | Mod: 26,,, | Performed by: STUDENT IN AN ORGANIZED HEALTH CARE EDUCATION/TRAINING PROGRAM

## 2023-02-01 PROCEDURE — 77014 PR  CT GUIDANCE PLACEMENT RAD THERAPY FIELDS: CPT | Mod: 26,,, | Performed by: STUDENT IN AN ORGANIZED HEALTH CARE EDUCATION/TRAINING PROGRAM

## 2023-02-02 ENCOUNTER — PATIENT MESSAGE (OUTPATIENT)
Dept: HEMATOLOGY/ONCOLOGY | Facility: CLINIC | Age: 68
End: 2023-02-02
Payer: MEDICARE

## 2023-02-02 DIAGNOSIS — R13.10 DYSPHAGIA, UNSPECIFIED TYPE: ICD-10-CM

## 2023-02-02 DIAGNOSIS — C15.9 ESOPHAGEAL ADENOCARCINOMA: Primary | ICD-10-CM

## 2023-02-02 PROCEDURE — 77336 RADIATION PHYSICS CONSULT: CPT | Performed by: STUDENT IN AN ORGANIZED HEALTH CARE EDUCATION/TRAINING PROGRAM

## 2023-02-03 ENCOUNTER — DOCUMENTATION ONLY (OUTPATIENT)
Dept: RADIATION ONCOLOGY | Facility: CLINIC | Age: 68
End: 2023-02-03
Payer: MEDICARE

## 2023-02-03 NOTE — PLAN OF CARE
Completed 23/23 of outpatient radiation to the esophagus. Tolerated therapy well. RTC in 6 weeks.

## 2023-02-08 ENCOUNTER — TELEPHONE (OUTPATIENT)
Dept: HEMATOLOGY/ONCOLOGY | Facility: CLINIC | Age: 68
End: 2023-02-08
Payer: MEDICARE

## 2023-02-08 NOTE — TELEPHONE ENCOUNTER
: URIEL Manriquez MD  Treating Investigators: NIRAV Medrano MD  Surgical Oncologist: SARAH Brown M.D. / Gerri Zhang NP  Radiation Oncologist: Javier Moulton M.D, PhD    Protocol: ECOG-ACRIN [id number]  IRB#: [id number]  Patient ID: [id number]  Treatment: Arm B - Carbo+Taxol+Nivolumab  Neoadjuvant ChemoRT Completion Date: 01Feb2023    A Phase II/III Study of Dilcia-operative Nivolumab and Ipilimumab in Patients with Locoregional Esophageal and Gastroesophageal Junction Adenocarcinoma       Spoke with patient today @ 2085 returning his phone call. Patient is reporting left mid-lower back pain since last night, tender to the touch (could not sleep on his back last night) & 8/10. He has an expansive history with left-sided kidney stones & would like imaging done to r/o kidney stones.   He also reported that he is still having dysphagia, but mostly when he has to take large oral medications, like his Metformin and potassium citrate.    CRC informed patient that a message will to be sent to Dr. Hughes, Dr. Oconnell & Dr. Medrano with both of his questions. Patient also informed that he may not hear from me or a respective physician's office until late today or tomorrow. Patient verbalized his understanding of the plan & denies any additional questions or concerns.

## 2023-02-09 ENCOUNTER — OFFICE VISIT (OUTPATIENT)
Dept: OPTOMETRY | Facility: CLINIC | Age: 68
End: 2023-02-09
Payer: MEDICARE

## 2023-02-09 ENCOUNTER — HOSPITAL ENCOUNTER (OUTPATIENT)
Dept: RADIOLOGY | Facility: HOSPITAL | Age: 68
Discharge: HOME | End: 2023-02-09
Attending: UROLOGY
Payer: MEDICARE

## 2023-02-09 ENCOUNTER — TELEPHONE (OUTPATIENT)
Dept: UROLOGY | Facility: HOSPITAL | Age: 68
End: 2023-02-09
Payer: MEDICARE

## 2023-02-09 ENCOUNTER — TELEPHONE (OUTPATIENT)
Dept: UROLOGY | Facility: CLINIC | Age: 68
End: 2023-02-09
Payer: MEDICARE

## 2023-02-09 DIAGNOSIS — H52.203 HYPEROPIA WITH ASTIGMATISM AND PRESBYOPIA, BILATERAL: ICD-10-CM

## 2023-02-09 DIAGNOSIS — N22 CALCULUS OF URINARY TRACT IN DISEASES CLASSIFIED ELSEWHERE: Primary | ICD-10-CM

## 2023-02-09 DIAGNOSIS — H43.393 VISUAL FLOATERS, BILATERAL: ICD-10-CM

## 2023-02-09 DIAGNOSIS — H25.13 NUCLEAR SCLEROSIS OF BOTH EYES: ICD-10-CM

## 2023-02-09 DIAGNOSIS — E11.9 TYPE 2 DIABETES MELLITUS WITHOUT RETINOPATHY: Primary | ICD-10-CM

## 2023-02-09 DIAGNOSIS — H52.4 HYPEROPIA WITH ASTIGMATISM AND PRESBYOPIA, BILATERAL: ICD-10-CM

## 2023-02-09 DIAGNOSIS — H52.03 HYPEROPIA WITH ASTIGMATISM AND PRESBYOPIA, BILATERAL: ICD-10-CM

## 2023-02-09 DIAGNOSIS — N22 CALCULUS OF URINARY TRACT IN DISEASES CLASSIFIED ELSEWHERE: ICD-10-CM

## 2023-02-09 DIAGNOSIS — E11.36 TYPE 2 DIABETES MELLITUS WITH CATARACT: ICD-10-CM

## 2023-02-09 PROCEDURE — 74176 CT ABD & PELVIS W/O CONTRAST: CPT | Mod: TC

## 2023-02-09 PROCEDURE — 99999 PR PBB SHADOW E&M-EST. PATIENT-LVL II: ICD-10-PCS | Mod: PBBFAC,,, | Performed by: OPTOMETRIST

## 2023-02-09 PROCEDURE — 92015 PR REFRACTION: ICD-10-PCS | Mod: ,,, | Performed by: OPTOMETRIST

## 2023-02-09 PROCEDURE — 92014 COMPRE OPH EXAM EST PT 1/>: CPT | Mod: S$PBB,,, | Performed by: OPTOMETRIST

## 2023-02-09 PROCEDURE — 74176 CT RENAL STONE STUDY ABD PELVIS WO: ICD-10-PCS | Mod: 26,,, | Performed by: RADIOLOGY

## 2023-02-09 PROCEDURE — 92015 DETERMINE REFRACTIVE STATE: CPT | Mod: ,,, | Performed by: OPTOMETRIST

## 2023-02-09 PROCEDURE — 92014 PR EYE EXAM, EST PATIENT,COMPREHESV: ICD-10-PCS | Mod: S$PBB,,, | Performed by: OPTOMETRIST

## 2023-02-09 PROCEDURE — 99212 OFFICE O/P EST SF 10 MIN: CPT | Mod: PBBFAC,PO | Performed by: OPTOMETRIST

## 2023-02-09 PROCEDURE — 74176 CT ABD & PELVIS W/O CONTRAST: CPT | Mod: 26,,, | Performed by: RADIOLOGY

## 2023-02-09 PROCEDURE — 99999 PR PBB SHADOW E&M-EST. PATIENT-LVL II: CPT | Mod: PBBFAC,,, | Performed by: OPTOMETRIST

## 2023-02-09 NOTE — PROGRESS NOTES
HPI    PARTH: 02/22  Chief complaint (CC): Patient is here for annual eye exam today.  Patient   hasn't noticed any vision changes since the last exam.  Patient has   progressive lenses but he likes OTC readers better for near.  Glasses? +4 yrs. Old and +3.00 OTC  Contacts? -  H/o eye surgery, injections or laser: -  H/o eye injury: -  Known eye conditions? See above  Family h/o eye conditions? -  Eye gtts? Visine prn for dryness      (-) Flashes (+)  Floaters (-) Mucous   (-)  Tearing (-) Itching (-) Burning   (-) Headaches (-) Eye Pain/discomfort (-) Irritation   (-)  Redness (-) Double vision (-) Blurry vision    Diabetic? + this morning  A1c? Hemoglobin A1C       Date                     Value               Ref Range             Status                11/07/2022               6.9 (H)             4.0 - 5.6 %           Final                 10/04/2022               6.4 (H)             4.0 - 5.6 %           Final                 05/02/2022               6.8 (H)             4.0 - 5.6 %           Final                Last edited by Tamar Murillo on 2/9/2023  2:35 PM.            Assessment /Plan     For exam results, see Encounter Report.      Type 2 diabetes mellitus without retinopathy  BS control. No signs of diabetic retinopathy. Monitor with annual exam.     Nuclear sclerosis of both eyes  Type 2 diabetes mellitus with cataract  Nuclear sclerotic cataract - not visually significant. Observe.    Visual floaters, bilateral  No e/o h/b/t 360 degrees OU. Monitor for worsening of symptoms or S/Sx of RD.     Hyperopia with astigmatism and presbyopia, bilateral  SRx released to patient. Patient educated on lens options. Normal ocular health. RTC 1 year for routine exam.

## 2023-02-09 NOTE — TELEPHONE ENCOUNTER
I VINCENT Lal & he is scheduled per below.  Thank you    2/9/2023 Status: [facility]    Appt Time: 5:15 PM Length: 15   Visit Type: CT RENAL STONE

## 2023-02-09 NOTE — TELEPHONE ENCOUNTER
Recommend CT RSS for acute renal colic.  Please reach out to arrange with the patient.  Thank you.      ----- Message from Maria Esther Stahl sent at 2/8/2023  1:56 PM CST -----  Good afternoon all,    Ed is a mutual patient of ours that completed ChemoRT last week with a long history of kidney stones & the like. He started having left mid-lower back pain last night that was 8/10 & very tender. Today it is more intermittent, but pain level still at 8/10. He would like to have an US for peace of mind - please advise.     Thank you!  Maria Esther

## 2023-02-09 NOTE — TELEPHONE ENCOUNTER
I SWP Lukasz & he is scheduled per below.  Thank you     2/9/2023 Status: [facility]   Appt Time: 5:15 PM Length: 15   Visit Type: CT RENAL STONE   ----- Message from Lukasz Hughes MD sent at 2/9/2023  7:04 AM CST -----  In the context of h/o kidney stones and recent chemo, he is high risk.  Also, very symptomatic.  CT RSS would be more helpful than a renal US in my opinion.  I put an order in for this.  Jossie, please reach out to offer the CT scan to rule out kidney stones.     Lukasz  ----- Message -----  From: Maria Esther Stahl  Sent: 2/8/2023   2:02 PM CST  To: Lukasz Hughes MD, Karol Oconnell MD, #    Good afternoon all,    Ed is a mutual patient of ours that completed ChemoRT last week with a long history of kidney stones & the like. He started having left mid-lower back pain last night that was 8/10 & very tender. Today it is more intermittent, but pain level still at 8/10. He would like to have an US for peace of mind - please advise.     Thank you!  Maria Esther

## 2023-02-10 ENCOUNTER — PATIENT MESSAGE (OUTPATIENT)
Dept: HEMATOLOGY/ONCOLOGY | Facility: CLINIC | Age: 68
End: 2023-02-10
Payer: MEDICARE

## 2023-02-10 ENCOUNTER — PATIENT MESSAGE (OUTPATIENT)
Dept: RADIATION ONCOLOGY | Facility: CLINIC | Age: 68
End: 2023-02-10
Payer: MEDICARE

## 2023-02-10 ENCOUNTER — TELEPHONE (OUTPATIENT)
Dept: INTERNAL MEDICINE | Facility: CLINIC | Age: 68
End: 2023-02-10
Payer: MEDICARE

## 2023-02-10 ENCOUNTER — PATIENT MESSAGE (OUTPATIENT)
Dept: UROLOGY | Facility: CLINIC | Age: 68
End: 2023-02-10
Payer: MEDICARE

## 2023-02-10 RX ORDER — POTASSIUM CITRATE AND CITRIC ACID MONOHYDRATE 1100; 334 MG/5ML; MG/5ML
5 SOLUTION ORAL
Qty: 450 ML | Refills: 6 | Status: SHIPPED | OUTPATIENT
Start: 2023-02-10 | End: 2023-04-03

## 2023-02-10 RX ORDER — METFORMIN HYDROCHLORIDE 500 MG/5ML
1000 SOLUTION ORAL 2 TIMES DAILY
Qty: 1800 ML | Refills: 3 | Status: SHIPPED | OUTPATIENT
Start: 2023-02-10 | End: 2023-05-29 | Stop reason: ALTCHOICE

## 2023-02-10 NOTE — TELEPHONE ENCOUNTER
----- Message from Maria Esther Stahl sent at 2/10/2023  7:39 AM CST -----  Good morning!    Our mutual patient has esophageal cancer & is having a lot of difficulty swallowing the metformin (Dr. Wilson) & Potassium citrate (Dr. Oconnell). Can we change the formulation of metformin to 500 mg/5 mL solution & poatssium citrate/citric acid powder for him please?    I appreciate your assistance & guidance in caring for our patient!  Maria Esther    ----- Message -----  From: Gerri Chow, PharmGREG  Sent: 2/9/2023   9:55 PM CST  To: Maria Esther Stahl, Miki Medrano MD    Yes. He cannot crush either medication. Recommend changing to the formulations you found below.    Thanks,  Gerri    ----- Message -----  From: Maria Esther Stahl  Sent: 2/9/2023   1:14 PM CST  To: Gerri Chow PharmD, #    Gerri --    I saw that Metformin comes in a 500 mg/5 mL solution & there is a potassium citrate/citric acid powder - would these be okay to replace his current tablet formulas?    Thank you!  Maria Esther    ----- Message -----  From: Miki Medrano MD  Sent: 2/8/2023   4:26 PM CST  To: Gerri Chow, PharmD, Maria Esther Stahl    I have no clue.  Gerri, do you know?  ----- Message -----  From: Maria Esther Stahl  Sent: 2/8/2023   2:10 PM CST  To: MD Nik Chasey -- he's having trouble swallowing his metformin & potassium citrate. Can he crush them or is there a liquid formula?    Thanks!

## 2023-02-13 ENCOUNTER — PATIENT MESSAGE (OUTPATIENT)
Dept: HEMATOLOGY/ONCOLOGY | Facility: CLINIC | Age: 68
End: 2023-02-13
Payer: MEDICARE

## 2023-02-15 ENCOUNTER — OFFICE VISIT (OUTPATIENT)
Dept: PSYCHIATRY | Facility: CLINIC | Age: 68
End: 2023-02-15
Payer: MEDICARE

## 2023-02-15 DIAGNOSIS — C15.9 ESOPHAGEAL ADENOCARCINOMA: ICD-10-CM

## 2023-02-15 DIAGNOSIS — F43.29 STRESS AND ADJUSTMENT REACTION: ICD-10-CM

## 2023-02-15 PROCEDURE — 99999 PR PBB SHADOW E&M-EST. PATIENT-LVL II: CPT | Mod: PBBFAC,,, | Performed by: PSYCHOLOGIST

## 2023-02-15 PROCEDURE — 99212 OFFICE O/P EST SF 10 MIN: CPT | Mod: PBBFAC | Performed by: PSYCHOLOGIST

## 2023-02-15 PROCEDURE — 99999 PR PBB SHADOW E&M-EST. PATIENT-LVL II: ICD-10-PCS | Mod: PBBFAC,,, | Performed by: PSYCHOLOGIST

## 2023-02-15 PROCEDURE — 90791 PSYCH DIAGNOSTIC EVALUATION: CPT | Mod: ,,, | Performed by: PSYCHOLOGIST

## 2023-02-15 PROCEDURE — 90791 PR PSYCHIATRIC DIAGNOSTIC EVALUATION: ICD-10-PCS | Mod: ,,, | Performed by: PSYCHOLOGIST

## 2023-02-15 NOTE — PROGRESS NOTES
9INFORMED CONSENT/ LIMITS of CONFIDENTIALITY: Prior to beginning the interview, the patient's identification was confirmed using two identifiers. Lukasz Person  was informed of the possible risks and benefits of psychological interventions (e.g., counseling, psychotherapy, testing) and provided information regarding the handling of protected health records and   the limits of confidentiality, including the importance of reporting any suicidal or homicidal ideation to ensure safety of all parties. This provider explained the purpose of today's appointment and the patient was provided with time to ask questions regarding this information.  Acceptance and understanding of these conditions was expressed, and Lukasz Person freely consented to this evaluation.     PSYCHO-ONCOLOGY INTAKE    Diagnostic Interview - CPT 79840    Date: 2/15/2023  Site: [facility]     Evaluation Length (direct face-to-face time):  1 hour     Referral Source: Colleen Smith PA-C   Oncologist:   PCP: Kirk Wilson MD    Clinical status of patient: Outpatient    Lukasz Person, a 67 y.o. male, seen for initial evaluation visit.  Met with patient.    Chief complaint/reason for encounter: adjustment to illness, Psychological Evaluation and treatment recommendations    Medical/Surgical History:    Patient Active Problem List   Diagnosis    Type 2 diabetes mellitus with kidney complication, without long-term current use of insulin    KUMAR on CPAP    Gout    Hyperparathyroidism, primary    Hypertension associated with diabetes    Hyperlipidemia associated with type 2 diabetes mellitus    Severe obesity (BMI 35.0-35.9 with comorbidity)    Obesity, diabetes, and hypertension syndrome    DM type 2 without retinopathy    Diabetes mellitus with cataract    Male hypogonadism    Coronary artery disease involving native coronary artery of native heart    Low serum alkaline phosphatase    BPH with urinary obstruction    Erectile dysfunction due to arterial  insufficiency    Chronic left shoulder pain    Nephrolithiasis    Paroxysmal atrial fibrillation    Chronic anticoagulation    HFrEF (heart failure with reduced ejection fraction)    Long term current use of amiodarone    Non-ischemic cardiomyopathy    CKD stage 3 due to type 2 diabetes mellitus    Esophageal adenocarcinoma    Aortic atherosclerosis       Health Behaviors:       ETOH Use: No (rare)       Tobacco Use: Not currently; history of smoking several years ago   Illicit Drug Use:  No     Prescription Misuse:No   Caffeine: minimal   Exercise:The patient engaged in regular activity prior to illness The patient is actively working to return to baseline exercise tolerance.   Firearms:  No   Advanced directives:No     Family History:   Psychiatric illness: Niece- MH issues     Alcohol/Drug Abuse: Yes  Paternal Aunt with ETOH   Suicide: No      Past Psychiatric History:   Inpatient treatment: No     Outpatient treatment: Yes In graduate school 1980s for stress;  1990s for    Prior substance abuse treatment: No     Suicide Attempts: No     Psychotropic Medications:  Current: none       Past: none    Current medications as per below, allergies reviewed in chart.    Current Outpatient Medications   Medication    ACCU-CHEK SOFTCLIX LANCETS Misc    allopurinoL (ZYLOPRIM) 100 MG tablet    blood sugar diagnostic (ACCU-CHEK ANTONELLA PLUS TEST STRP) Strp    blood-glucose meter kit    citric acid-potassium citrate (POLYCITRA) 1,100-334 mg/5 mL solution    diphenhydrAMINE-aluminum-magnesium hydroxide-simethicone-LIDOcaine HCl 2%    glimepiride (AMARYL) 2 MG tablet    lisinopriL-hydrochlorothiazide (PRINZIDE,ZESTORETIC) 20-25 mg Tab    magic mouthwash diphen/antac/lidoc    metFORMIN 500 mg/5 mL Soln    metoprolol succinate (TOPROL-XL) 25 MG 24 hr tablet    nitroGLYCERIN (NITROSTAT) 0.4 MG SL tablet    omeprazole (PRILOSEC) 40 MG capsule    rivaroxaban (XARELTO) 20 mg Tab    rosuvastatin (CRESTOR) 20 MG tablet    tamsulosin  (FLOMAX) 0.4 mg Cap    testosterone (ANDROGEL) 20.25 mg/1.25 gram (1.62 %) GlPm     No current facility-administered medications for this visit.          Social situation/Stressors: Lukasz Person lives with wife in [facility].  He is not currently employed but has worked in  [employer] He has been in his job for 30 years.  He retired in 2019 and moved to [facility]. Moved form [place], [place].   Lukasz Person has been  26 and has 2 stepchildren.  His spouse is supportive.   The patient reports good social support. Lukasz Person is an active member of the [email or web address].  Lukasz Person's hobbies include education, training, working, events and fundraising. Traveling and playing guitar in of interest Patient's family is aware of his diagnosis. All but his grandchildren.     Additional stressors:  Limited social contact    Strengths:Able to vocalize needs, Values and traditions, Motivation, readiness for change, Vocational interests, hobbies and/or talents, Interpersonal relationships and supports available - family, relatives, friends, and Cultural/spiritual/Gnosticism and community involvement  Liabilities: Complicated medical illness    Current Evaluation:     Mental Status Exam: Lukasz Person arrived promptly for the assessment session. The patient was fully cooperative throughout the interview and was an adequate historian   Appearance: age appropriate, appropriately  dressed, adequately  groomed  Behavior/Cooperation: friendly and cooperative  Speech: normal in rate, volume, and tone and appropriate quality, quantity and organization of sentences  Mood: euthymic  Affect: mood congruent  Thought Process: goal-directed, logical  Thought Content: normal, no suicidality, no homicidality, delusions, or paranoia;did not appear to be responding to internal stimuli during the interview.   Orientation: grossly intact  Memory: Grossly intact  Attention Span/Concentration: Attends to interview without  distraction; reports no difficulty  Fund of Knowledge: average  Estimate of Intelligence: average from verbal skills and history  Cognition: grossly intact  Insight: patient has awareness of illness; good insight into own behavior and behavior of others  Judgment: the patient's behavior is adequate to circumstances  DISTRESS SCREENING 2/10/2023 1/30/2023 1/27/2023 1/27/2023 1/20/2023 1/18/2023 1/13/2023   Distress Score - 0 - No Distress 0 - No Distress 3 3 3 0 - No Distress   Practical Problems None of these None of these None of these None of these - None of these -   Family Problems None of these None of these None of these None of these - None of these -   Emotional Problems Fears None of these None of these None of these - None of these -   Spiritual / Islam Concerns No No No No - No -   Physical Problems Eating;Skin Dry/Itchy;Sleep None of these None of these Eating;Fatigue;Indigestion;Nausea;Skin Dry/Itchy - Eating;Fatigue;Nausea;Sleep -   Other Problems Back pain (possible kidney) problem - - - - - -          PHQ ANSWERS    Q1. Little interest or pleasure in doing things: (P) Several days (02/10/23 0801)  Q2. Feeling down, depressed, or hopeless: (P) Several days (02/10/23 0801)  Q3. Trouble falling or staying asleep, or sleeping too much: (P) Several days (02/10/23 0801)  Q4. Feeling tired or having little energy: (P) Several days (02/10/23 0801)  Q5. Poor appetite or overeating: (P) Several days (02/10/23 0801)  Q6. Feeling bad about yourself - or that you are a failure or have let yourself or your family down: (P) Several days (02/10/23 0801)  Q7. Trouble concentrating on things, such as reading the newspaper or watching television: (P) Not at all (02/10/23 0801)  Q8. Moving or speaking so slowly that other people could have noticed. Or the opposite - being so fidgety or restless that you have been moving around a lot more than usual: (P) Not at all (02/10/23 0801)  Q9.  0    PHQ8 Score : (P) 6  (02/10/23 0801)  PHQ-9 Total Score: (P) 6 (02/10/23 0801)       LARS-7     GAD7 2/10/2023   1. Feeling nervous, anxious, or on edge? 0   2. Not being able to stop or control worrying? 1   3. Worrying too much about different things? 0   4. Trouble relaxing? 0   5. Being so restless that it is hard to sit still? 0   6. Becoming easily annoyed or irritable? 1   7. Feeling afraid as if something awful might happen? 0   LARS-7 Score 2          History of present illness:    Oncology History   Esophageal adenocarcinoma   11/17/2022 Cancer Staged    Staging form: Esophagus - Adenocarcinoma, AJCC 8th Edition  - Clinical stage from 11/17/2022: Stage III (cT3, cN0, cM0)       12/8/2022 Initial Diagnosis    Esophageal adenocarcinoma     12/21/2022 - 12/21/2022 Chemotherapy    Treatment Summary   Plan Name:  ESOPHAGEAL PACLITAXEL CARBOPLATIN WEEKLY  Treatment Goal: Curative  Status: Inactive  Start Date:   End Date:   Provider: Miki Medrano MD  Chemotherapy: CARBOplatin (PARAPLATIN) in sodium chloride 0.9% 250 mL chemo infusion, , Intravenous, Clinic/HOD 1 time, 0 of 1 cycle  PACLitaxeL (TAXOL) 50 mg/m2 = 120 mg in sodium chloride 0.9% 250 mL chemo infusion, 50 mg/m2, Intravenous, Clinic/HOD 1 time, 0 of 1 cycle       12/29/2022 -  Chemotherapy    Treatment Summary   Plan Name: [facility] [id number] ARM B CARBOPLATIN PACLITAXEL NIVOLUMAB  Treatment Goal: Curative  Status: Active  Start Date: 12/29/2022  End Date: 1/27/2023 (Planned)  Provider: Miki Medrano MD  Chemotherapy: CARBOplatin (PARAPLATIN) 215 mg in sodium chloride 0.9% 306.5 mL chemo infusion, 215 mg, Intravenous, Clinic/HOD 1 time, 4 of 5 cycles  Administration: 215 mg (12/29/2022), 230 mg (1/5/2023), 265 mg (1/13/2023), 265 mg (1/20/2023)  PACLitaxeL (TAXOL) 50 mg/m2 = 120 mg in sodium chloride 0.9% 250 mL chemo infusion, 50 mg/m2 = 120 mg, Intravenous, Clinic/HOD 1 time, 4 of 5 cycles  Dose modification: 50 mg/m2 (original dose 50 mg/m2, Cycle  "4)  Administration: 120 mg (12/29/2022), 120 mg (1/5/2023), 120 mg (1/13/2023), 120 mg (1/20/2023)       12/29/2022 - 2/1/2023 Radiation Therapy    Treating physician: Dr. Javier Moulton    Course: C1 CHEST 2022    Treatment Site Ref. ID Energy Dose/Fx (Gy) #Fx Dose Correction (Gy) Total Dose (Gy) Start Date End Date Elapsed Days   IM Esophagus [id number] 6X 1.8 23 / 23 0 41.4 12/29/2022 2/1/2023 34          Patient with esophageal cancer- completed neoadjuvant chemoRT- plans for surgery on 3/16. Initially denied anxiety/depression but was interested in meeting. Wife described him to staff as "ornery." Patient seeking assessment, mostly to see if he is ok. Patient is seeking to build more of a sense of community.    Lukasz Person has adjusted to illness fairly well primarily through active coping strategies. He has engaged in appropriate information gathering.  The patient has good family/friend support.  His support system is coping well with the diagnosis/treatment/prognosis. Illness-related psychosocial stressors include difficulty meeting family responsibilities, changes in ability to engage in leisure activities, and absence from home.  The patient has a good partnership with his [facility] oncology treatment team. The patient reports the following barriers to cancer care:none.       Patient Reported Cancer Treatment Symptoms:  nausea and appetite changes, swallowing issues, fatigue, skin itching     Behavioral Health Symptoms:   Mood: Depression: depressed mood, anhedonia, disturbed sleep, and decreased appetite prior depression:low mood ; no SI/HI  Marissa: Denies  Psychosis: Denies   Anxiety: Uncontrollable worry (about finances) and Irritability;  no prior  Generalized anxiety: Denies    Panic Disorder: Denies  Social/specific phobia: Denies   OCD: Denies  Trauma: Denies  Sexual Dysfunction:  Denies  Substance abuse: denied  Cognitive functioning: denied  Health behaviors: noncontributory  Sleep: Most days no " concerns, some awakening Recently bout of nighttime anxious  Pain: Mr. Person reports some back pain. Symptoms interfere with  sleeping.  CAM Therapies: THC occasionally        Assessment - Diagnosis - Goals:       ICD-10-CM ICD-9-CM   1. Esophageal adenocarcinoma  C15.9 150.9   2. Stress and adjustment reaction  F43.29 309.89       Plan:individual psychotherapy    Summary and Recommendations  Lukasz Person is a 67 y.o. male referred by Colleen Smith PA-C for psychological evaluation and treatment.  Mr. Person appears to be coping marginally with his diagnosis and proposed treatment course.  Patient has good support system. Mood protective strategies during cancer treatment were discussed.  He is interested in individual therapy for cancer coping skills and will follow up with Resident Logan for that purpose..       Next Session:  2 weeks    Disha Harman, PhD  Clinical Psychologist  [place] License #[id number]  [place] License #[id number]

## 2023-02-16 DIAGNOSIS — E11.65 UNCONTROLLED TYPE 2 DIABETES MELLITUS WITH HYPERGLYCEMIA: ICD-10-CM

## 2023-02-17 RX ORDER — BLOOD SUGAR DIAGNOSTIC
STRIP MISCELLANEOUS
Qty: 50 STRIP | Refills: 11 | Status: SHIPPED | OUTPATIENT
Start: 2023-02-17 | End: 2023-03-22 | Stop reason: SDUPTHER

## 2023-02-24 ENCOUNTER — PATIENT MESSAGE (OUTPATIENT)
Dept: INTERNAL MEDICINE | Facility: CLINIC | Age: 68
End: 2023-02-24
Payer: MEDICARE

## 2023-02-27 ENCOUNTER — HOSPITAL ENCOUNTER (OUTPATIENT)
Dept: PULMONOLOGY | Facility: CLINIC | Age: 68
Discharge: HOME | End: 2023-02-27
Payer: MEDICARE

## 2023-02-27 ENCOUNTER — OFFICE VISIT (OUTPATIENT)
Dept: SURGERY | Facility: CLINIC | Age: 68
End: 2023-02-27
Payer: MEDICARE

## 2023-02-27 ENCOUNTER — INFUSION (OUTPATIENT)
Dept: INFUSION THERAPY | Facility: HOSPITAL | Age: 68
End: 2023-02-27
Payer: MEDICARE

## 2023-02-27 VITALS
OXYGEN SATURATION: 97 % | DIASTOLIC BLOOD PRESSURE: 58 MMHG | HEIGHT: 73 IN | WEIGHT: 246.81 LBS | BODY MASS INDEX: 32.71 KG/M2 | SYSTOLIC BLOOD PRESSURE: 122 MMHG | HEART RATE: 76 BPM

## 2023-02-27 DIAGNOSIS — D49.1 NEOPLASM OF UNSPECIFIED BEHAVIOR OF RESPIRATORY SYSTEM: ICD-10-CM

## 2023-02-27 DIAGNOSIS — L02.31 ABSCESS OF RIGHT BUTTOCK: ICD-10-CM

## 2023-02-27 DIAGNOSIS — E66.01 SEVERE OBESITY (BMI 35.0-35.9 WITH COMORBIDITY): ICD-10-CM

## 2023-02-27 DIAGNOSIS — C15.9 ESOPHAGEAL ADENOCARCINOMA: Primary | ICD-10-CM

## 2023-02-27 DIAGNOSIS — E11.9 DM TYPE 2 WITHOUT RETINOPATHY: Chronic | ICD-10-CM

## 2023-02-27 DIAGNOSIS — C15.9 ESOPHAGEAL ADENOCARCINOMA: ICD-10-CM

## 2023-02-27 PROCEDURE — A4216 STERILE WATER/SALINE, 10 ML: HCPCS | Performed by: INTERNAL MEDICINE

## 2023-02-27 PROCEDURE — 99214 OFFICE O/P EST MOD 30 MIN: CPT | Mod: PBBFAC,25 | Performed by: STUDENT IN AN ORGANIZED HEALTH CARE EDUCATION/TRAINING PROGRAM

## 2023-02-27 PROCEDURE — 10060 PR DRAIN SKIN ABSCESS SIMPLE: ICD-10-PCS | Mod: S$PBB,,, | Performed by: STUDENT IN AN ORGANIZED HEALTH CARE EDUCATION/TRAINING PROGRAM

## 2023-02-27 PROCEDURE — 87075 CULTR BACTERIA EXCEPT BLOOD: CPT | Performed by: STUDENT IN AN ORGANIZED HEALTH CARE EDUCATION/TRAINING PROGRAM

## 2023-02-27 PROCEDURE — 94060 PR EVAL OF BRONCHOSPASM: ICD-10-PCS | Mod: 26,S$PBB,, | Performed by: INTERNAL MEDICINE

## 2023-02-27 PROCEDURE — 99999 PR PBB SHADOW E&M-EST. PATIENT-LVL IV: ICD-10-PCS | Mod: PBBFAC,,, | Performed by: STUDENT IN AN ORGANIZED HEALTH CARE EDUCATION/TRAINING PROGRAM

## 2023-02-27 PROCEDURE — 94060 EVALUATION OF WHEEZING: CPT | Mod: PBBFAC | Performed by: INTERNAL MEDICINE

## 2023-02-27 PROCEDURE — 94729 DIFFUSING CAPACITY: CPT | Mod: 26,S$PBB,, | Performed by: INTERNAL MEDICINE

## 2023-02-27 PROCEDURE — 25000003 PHARM REV CODE 250: Performed by: INTERNAL MEDICINE

## 2023-02-27 PROCEDURE — 94727 PR PULM FUNCTION TEST BY GAS: ICD-10-PCS | Mod: 26,S$PBB,, | Performed by: INTERNAL MEDICINE

## 2023-02-27 PROCEDURE — 96523 IRRIG DRUG DELIVERY DEVICE: CPT

## 2023-02-27 PROCEDURE — 94729 DIFFUSING CAPACITY: CPT | Mod: PBBFAC | Performed by: INTERNAL MEDICINE

## 2023-02-27 PROCEDURE — 94729 PR C02/MEMBANE DIFFUSE CAPACITY: ICD-10-PCS | Mod: 26,S$PBB,, | Performed by: INTERNAL MEDICINE

## 2023-02-27 PROCEDURE — 99999 PR PBB SHADOW E&M-EST. PATIENT-LVL IV: CPT | Mod: PBBFAC,,, | Performed by: STUDENT IN AN ORGANIZED HEALTH CARE EDUCATION/TRAINING PROGRAM

## 2023-02-27 PROCEDURE — 94727 GAS DIL/WSHOT DETER LNG VOL: CPT | Mod: 26,S$PBB,, | Performed by: INTERNAL MEDICINE

## 2023-02-27 PROCEDURE — 63600175 PHARM REV CODE 636 W HCPCS: Performed by: INTERNAL MEDICINE

## 2023-02-27 PROCEDURE — 94060 EVALUATION OF WHEEZING: CPT | Mod: 26,S$PBB,, | Performed by: INTERNAL MEDICINE

## 2023-02-27 PROCEDURE — 87070 CULTURE OTHR SPECIMN AEROBIC: CPT | Performed by: STUDENT IN AN ORGANIZED HEALTH CARE EDUCATION/TRAINING PROGRAM

## 2023-02-27 PROCEDURE — 94727 GAS DIL/WSHOT DETER LNG VOL: CPT | Mod: PBBFAC | Performed by: INTERNAL MEDICINE

## 2023-02-27 PROCEDURE — 10060 I&D ABSCESS SIMPLE/SINGLE: CPT | Mod: PBBFAC | Performed by: STUDENT IN AN ORGANIZED HEALTH CARE EDUCATION/TRAINING PROGRAM

## 2023-02-27 PROCEDURE — 99214 PR OFFICE/OUTPT VISIT, EST, LEVL IV, 30-39 MIN: ICD-10-PCS | Mod: 25,S$PBB,, | Performed by: STUDENT IN AN ORGANIZED HEALTH CARE EDUCATION/TRAINING PROGRAM

## 2023-02-27 PROCEDURE — 99214 OFFICE O/P EST MOD 30 MIN: CPT | Mod: 25,S$PBB,, | Performed by: STUDENT IN AN ORGANIZED HEALTH CARE EDUCATION/TRAINING PROGRAM

## 2023-02-27 PROCEDURE — 10060 I&D ABSCESS SIMPLE/SINGLE: CPT | Mod: S$PBB,,, | Performed by: STUDENT IN AN ORGANIZED HEALTH CARE EDUCATION/TRAINING PROGRAM

## 2023-02-27 PROCEDURE — 87076 CULTURE ANAEROBE IDENT EACH: CPT | Performed by: STUDENT IN AN ORGANIZED HEALTH CARE EDUCATION/TRAINING PROGRAM

## 2023-02-27 RX ORDER — SODIUM CHLORIDE 0.9 % (FLUSH) 0.9 %
10 SYRINGE (ML) INJECTION
OUTPATIENT
Start: 2023-02-27

## 2023-02-27 RX ORDER — HEPARIN 100 UNIT/ML
500 SYRINGE INTRAVENOUS
OUTPATIENT
Start: 2023-02-27

## 2023-02-27 RX ORDER — SODIUM CHLORIDE 0.9 % (FLUSH) 0.9 %
10 SYRINGE (ML) INJECTION
Status: DISCONTINUED | OUTPATIENT
Start: 2023-02-27 | End: 2023-02-27 | Stop reason: HOSPADM

## 2023-02-27 RX ORDER — HEPARIN 100 UNIT/ML
500 SYRINGE INTRAVENOUS
Status: DISCONTINUED | OUTPATIENT
Start: 2023-02-27 | End: 2023-02-27 | Stop reason: HOSPADM

## 2023-02-27 RX ORDER — SULFAMETHOXAZOLE AND TRIMETHOPRIM 400; 80 MG/1; MG/1
1 TABLET ORAL 2 TIMES DAILY
Qty: 10 TABLET | Refills: 0 | Status: SHIPPED | OUTPATIENT
Start: 2023-02-27 | End: 2023-03-04

## 2023-02-27 RX ADMIN — HEPARIN 500 UNITS: 100 SYRINGE at 01:02

## 2023-02-27 RX ADMIN — Medication 10 ML: at 01:02

## 2023-02-27 NOTE — H&P (VIEW-ONLY)
Surgical Oncology Clinic Note  [facility]       Referring Provider: No ref. provider found   PCP: Kirk Wilson MD    Reason For Visit: No chief complaint on file.      Oncologic History:  8/2022: PCP for progressive dysphagia  11/17/22: EGD:nodular mucosa in the esophagus at the GE junction. Esophageal mucosal changes suggestive of short-segment Paredes's esophagus. Biopsies positive for adenocarcinoma.   12/7/22: EUS: Partially obstructing, malignant esophageal tumor was found in the lower third of the esophagus staged T3 N0 Mx by endosonographic criteria.   12/8/22: PET CT: Distal esophageal wall thickening and hypermetabolic activity in keeping with known esophageal cancer with no definite evidence of metastatic disease      History of Present Illness:  Mr. Person is a 67 y.o. male who presents with newly diagnosed esophageal adenocarcinoma of the GE junction. He reports that beginning in late July 2022 he noticed trouble swallowing certain foods- 8/2022 saw his PCP as this problem persisted. EGD noted a nodular mucosal change at  the GE junction. Biopsy taken revealed adenocarcinoma. Subsequent EUS noted a partially obstructing tumor in the lower third of the esophagus that was staged T3N0Mx by endosonographic criteria.      He reports that he is able to eat many food but avoids foods such as steak and breads as well as some liquids and carbonated beverages. He has lost 3-5 lbs since July. Reports usual activity and energy level. Denies N/V/D, chest pain, SOB.       Interval Since Last Visit:   Lukasz Person returns today for follow-up after 4 cycles of neoadj chemo and radiation that began 12/29/22.  Final RT 2/1/23, last cycles of chemo held for thrombocytopenia.  Has been cleared by cardiology - does not need repeat echo (EF 60% <1 year ago) and to hold Xarelto 3 days prior.  Scheduled for PFTs 2/27/23.  Scheduled for re-staging imaging 3/1 and 3/2.     Doing well with chemoRT. Having indigestion relieved  "with tums and hiccups with eating. Having odynophagia. Lost about 12lbs. Overall doing well with treatment.      2/27/23:  Here for pre op for robotic esophagectomy. Discussed surgery, hospital stay, recovery, lifestyle changes in great detail with patient and wife. He is reporting a "boil" on his right buttock that he noticed starting Thursday after a long drive to [place] last week. He reports a tender area that has not improved and has noted some pinkish drainage from the site. Denies fever, chills, nausea, vomiting, abdominal pain. Still reporting odynophagia.    Pathology:  11/17/22: From EGD:   Final Pathologic Diagnosis        Abnormal   Gastroesophageal junction, biopsy:   - Adenocarcinoma.   - Background intestinal metaplasia with low and high-grade dysplasia.   - Focally suspicious for lymphovascular invasion.   - MMR pending, results will be issued in addendum.   Immunohistochemistry (IHC) Testing for Mismatch Repair (MMR) Proteins:   MLH1 - Intact nuclear expression   MSH2 - Intact nuclear expression   MSH6 - Intact nuclear expression   PMS2 - Intact nuclear expression   Background nonneoplastic tissue/internal control with intact nuclear   expression   IHC Interpretation   No loss of nuclear expression of MMR proteins: low probability of   microsatellite instability     Staging:  Cancer Staging   Esophageal adenocarcinoma  Staging form: Esophagus - Adenocarcinoma, AJCC 8th Edition  - Clinical stage from 11/17/2022: Stage III (cT3, cN0, cM0) - Signed by Javier Moulton MD on 12/14/2022         Current Outpatient Medications:     ACCU-CHEK SOFTCLIX LANCETS Misc, USE EVERY DAY, Disp: 100 each, Rfl: 6    allopurinoL (ZYLOPRIM) 100 MG tablet, TAKE 1 TABLET BY MOUTH EVERY DAY, Disp: 30 tablet, Rfl: 10    blood sugar diagnostic (ACCU-CHEK ANTONELLA PLUS TEST STRP) Strp, TEST EVERY MORNING, Disp: 50 strip, Rfl: 11    blood-glucose meter kit, Checks blood sugars 1x/daily., Disp: 1 each, Rfl: 12    citric " acid-potassium citrate (POLYCITRA) 1,100-334 mg/5 mL solution, Take 5 mLs (10 mEq total) by mouth 3 (three) times daily with meals., Disp: 450 mL, Rfl: 6    glimepiride (AMARYL) 2 MG tablet, TAKE 2 TABLETS BY MOUTH EVERY MORNING, Disp: 180 tablet, Rfl: 2    lisinopriL-hydrochlorothiazide (PRINZIDE,ZESTORETIC) 20-25 mg Tab, TAKE 1 TABLET BY MOUTH EVERY DAY, Disp: 90 tablet, Rfl: 3    metFORMIN 500 mg/5 mL Soln, Take 1,000 mg by mouth 2 (two) times a day., Disp: 1800 mL, Rfl: 3    metoprolol succinate (TOPROL-XL) 25 MG 24 hr tablet, Take 1 tablet (25 mg total) by mouth once daily., Disp: 30 tablet, Rfl: 11    omeprazole (PRILOSEC) 40 MG capsule, Take 1 capsule (40 mg total) by mouth once daily., Disp: 90 capsule, Rfl: 3    rivaroxaban (XARELTO) 20 mg Tab, Take 1 tablet (20 mg total) by mouth daily with dinner or evening meal., Disp: 30 tablet, Rfl: 11    rosuvastatin (CRESTOR) 20 MG tablet, TAKE 1 TABLET(20 MG) BY MOUTH EVERY DAY, Disp: 30 tablet, Rfl: 11    tamsulosin (FLOMAX) 0.4 mg Cap, Take 1 capsule (0.4 mg total) by mouth once daily., Disp: 30 capsule, Rfl: 11    testosterone (ANDROGEL) 20.25 mg/1.25 gram (1.62 %) GlPm, Apply 4 pumps to shoulders daily, Disp: 2 each, Rfl: 5    diphenhydrAMINE-aluminum-magnesium hydroxide-simethicone-LIDOcaine HCl 2%, Swish and swallow 10 mLs every 6 (six) hours as needed (pain swallowing)., Disp: 360 each, Rfl: 0    magic mouthwash diphen/antac/lidoc, Swish and swallow 10 ML's every 6 hours as needed (pain swallowing), Disp: 360 mL, Rfl: 0    nitroGLYCERIN (NITROSTAT) 0.4 MG SL tablet, Place 1 tablet (0.4 mg total) under the tongue every 5 (five) minutes as needed for Chest pain. If you need a third tablet, call 911, Disp: 60 tablet, Rfl: 12    sulfamethoxazole-trimethoprim 400-80mg (BACTRIM,SEPTRA) 400-80 mg per tablet, Take 1 tablet by mouth 2 (two) times daily. for 5 days, Disp: 10 tablet, Rfl: 0    Review of patient's allergies indicates:  No Known Allergies    Past Medical  History:   Diagnosis Date    Anticoagulant long-term use     Diabetes mellitus     Onset late 50s/early 60s    Hyperlipidemia     Hypertension     Onset late 50s/early 60s    Kidney stones     Sleep apnea     since 2006       Past Surgical History:   Procedure Laterality Date    CORONARY ANGIOGRAPHY N/A 9/30/2020    Procedure: ANGIOGRAM, CORONARY ARTERY;  Surgeon: John West MD;  Location: [facility] CATH LAB;  Service: Cardiology;  Laterality: N/A;    CYSTOSCOPY N/A 2/17/2022    Procedure: CYSTOSCOPY;  Surgeon: Lukasz Hughes MD;  Location: [facility] OR [address] [place] [address];  Service: Urology;  Laterality: N/A;    CYSTOSCOPY W/ URETERAL STENT PLACEMENT N/A 2/25/2022    Procedure: CYSTOSCOPY, WITH URETERAL STENT INSERTION;  Surgeon: Lukasz Hughes MD;  Location: [address];  Service: Urology;  Laterality: N/A;    ENDOSCOPIC ULTRASOUND OF UPPER GASTROINTESTINAL TRACT N/A 12/7/2022    Procedure: ULTRASOUND, UPPER GI TRACT, ENDOSCOPIC;  Surgeon: Darian Main MD;  Location: [facility];  Service: Endoscopy;  Laterality: N/A;  Approval to hold Xarelto rec'd from Dr. Nick (see t/e 12/5/22)-DS    ESOPHAGOGASTRODUODENOSCOPY N/A 11/17/2022    Procedure: EGD (ESOPHAGOGASTRODUODENOSCOPY);  Surgeon: Brody Gonzales MD;  Location: [facility] ([address] [place] [address]);  Service: Endoscopy;  Laterality: N/A;  inst via email-ok to hold Xarelto x 2 days-MS    LASER LITHOTRIPSY Left 2/25/2022    Procedure: LITHOTRIPSY, USING LASER;  Surgeon: Lukasz Hughes MD;  Location: [address];  Service: Urology;  Laterality: Left;    LEFT HEART CATHETERIZATION Left 9/30/2020    Procedure: Left heart cath;  Surgeon: John West MD;  Location: [facility] CATH LAB;  Service: Cardiology;  Laterality: Left;    LITHOTRIPSY      PARATHYROIDECTOMY  1/1/2-107    PYELOSCOPY Left 2/25/2022    Procedure: PYELOSCOPY;  Surgeon: Lukasz Hughes MD;  Location: [address];  Service: Urology;  Laterality: Left;    TRANSESOPHAGEAL ECHOCARDIOGRAPHY N/A  2022    Procedure: ECHOCARDIOGRAM, TRANSESOPHAGEAL;  Surgeon: Xander Diagnostic Provider;  Location: [facility] EP LAB;  Service: Cardiology;  Laterality: N/A;    TREATMENT OF CARDIAC ARRHYTHMIA N/A 2022    Procedure: Cardioversion or Defibrillation;  Surgeon: Iris Fisher NP;  Location: [facility] EP LAB;  Service: Cardiology;  Laterality: N/A;  afib, dccv, artie, anes, EH, 3prep    URETEROSCOPIC REMOVAL OF URETERIC CALCULUS Left 2022    Procedure: REMOVAL, CALCULUS, URETER, URETEROSCOPIC;  Surgeon: Lukasz Hughes MD;  Location: [facility] OR [address] [place] [address];  Service: Urology;  Laterality: Left;    URETEROSCOPY Left 2022    Procedure: URETEROSCOPY;  Surgeon: Lukasz Hughes MD;  Location: [facility] OR [address] [place] [address];  Service: Urology;  Laterality: Left;       Family History   Problem Relation Age of Onset    Arthritis Mother     Heart disease Father 70        CABG    Arthritis Father     Diabetes Father     Kidney disease Father         had one kidney removed in early thirties    Arthritis Sister     Arthritis Sister     Hypertension Sister     Colon cancer Brother 32    Arthritis Brother     Alcohol abuse Paternal Aunt     Cancer Maternal Grandfather         throat cancer    Diabetes Paternal Grandmother     Colon polyps Paternal Grandfather     Colon cancer Paternal Grandfather     Cancer Paternal Grandfather         colon cancer at age 62       Social History     Socioeconomic History    Marital status:      Spouse name: Amanda   Tobacco Use    Smoking status: Former     Packs/day: 1.00     Years: 7.00     Pack years: 7.00     Types: Cigarettes, Cigars     Start date: 1972     Quit date:      Years since quittin.8     Passive exposure: Never    Smokeless tobacco: Never    Tobacco comments:     smoking was off and on.  cumulative 7 years.  never more than three years in one stretch or more lj   Substance and Sexual Activity    Alcohol use: Yes     Alcohol/week: 3.0 standard drinks     Types: 2 Cans of  beer, 1 Shots of liquor per week     Comment: don't drink regularly.  6 to 7 monthly    Drug use: Not Currently     Types: Marijuana    Sexual activity: Not Currently     Partners: Female     Birth control/protection: None     Comment:      Social Determinants of Health     Financial Resource Strain: Low Risk     Difficulty of Paying Living Expenses: Not hard at all   Food Insecurity: No Food Insecurity    Worried About Running Out of Food in the Last Year: Never true    Ran Out of Food in the Last Year: Never true   Transportation Needs: No Transportation Needs    Lack of Transportation (Medical): No    Lack of Transportation (Non-Medical): No   Physical Activity: Sufficiently Active    Days of Exercise per Week: 5 days    Minutes of Exercise per Session: 40 min   Stress: No Stress Concern Present    Feeling of Stress : Only a little   Social Connections: Unknown    Frequency of Communication with Friends and Family: Once a week    Frequency of Social Gatherings with Friends and Family: Once a week    Active Member of Clubs or Organizations: Yes    Attends Club or Organization Meetings: More than 4 times per year    Marital Status:    Housing Stability: Low Risk     Unable to Pay for Housing in the Last Year: No    Number of Places Lived in the Last Year: 1    Unstable Housing in the Last Year: No             Vitals:    02/27/23 1124   BP: (!) 122/58   Pulse: 76     Body mass index is 32.56 kg/m².  Physical Exam  Constitutional:       General: He is not in acute distress.     Appearance: He is not ill-appearing.   Cardiovascular:      Rate and Rhythm: Normal rate.   Pulmonary:      Effort: Pulmonary effort is normal. No respiratory distress.   Abdominal:      General: There is no distension.   Genitourinary:         Comments: Abscess inferior to anus on right buttock  Erythema, induration with purulence expressed from punctum  Skin:     General: Skin is warm and dry.   Neurological:      General: No  focal deficit present.      Mental Status: He is alert.   Psychiatric:         Mood and Affect: Mood normal.         Behavior: Behavior normal.          DATA REVIEW:  I reviewed the following records for this visit: lab work from prior visit, notes from other physicians, surgical pathology results, endoscopy results, radiographic study evaluation, and laboratory results done by primary care physician      Lab Results   Component Value Date    WBC 3.42 (L) 01/30/2023    HGB 13.1 (L) 01/30/2023    HCT 40.5 01/30/2023    PLT 87 (L) 01/30/2023    CHOL 107 (L) 11/07/2022    TRIG 218 (H) 11/07/2022    HDL 34 (L) 11/07/2022    LDLCALC 29.4 (L) 11/07/2022    ALT 30 01/30/2023    AST 31 01/30/2023     (L) 01/30/2023    K 4.6 01/30/2023     01/30/2023    CREATININE 1.3 01/30/2023    BUN 24 (H) 01/30/2023    CO2 22 (L) 01/30/2023    TSH 1.514 01/19/2023    PSA 1.2 10/06/2021    INR 1.1 04/01/2022    HGBA1C 6.9 (H) 11/07/2022       No results found for: CEA, , , AFP     Radiology:   Final Pathologic Diagnosis   Date/Time Value Ref Range Status   11/17/2022 08:54 AM (A)  Corrected    Gastroesophageal junction, biopsy:  - Adenocarcinoma.  - Background intestinal metaplasia with low and high-grade dysplasia.  - Focally suspicious for lymphovascular invasion.  - MMR pending, results will be issued in addendum.  - See comment.  COMMENT:  The biopsy shows adenocarcinoma with single cell infiltration of  the lamina propria highlighted on cytokeratin stain arising in a background  of intestinal metaplasia with low and high-grade dysplasia.  There is an area  suspicious for lymphovascular invasion on routine H&E sections however this  area is not confirmed on levels or immunostain for CD 34.  Case is reviewed  by Neva Camargo and RYAN Gauthier who agree with the above diagnosis.  Appropriate positive controls are examined.       Comment:     Interp By Idania Flores MD, Signed on 12/05/2022 at 11:05       Procedure  "  2/27/23  Anesthesia: local-xylocaine 1%  Location: right buttock, medial; inferior to anus  Verbal consent obtained  Area prepped with Chloraprep  11-blade used to make incision into abscess cavity with expression of copious pus. Wound cultures sent from cavity.  Wound packed with 1/4" gauze soaked in iodine, 4x4 gauze and Kerlix placed over top  Patient tolerated procedure well, no immediate post procedure complications      ASSESSMENT & PLAN:  Lukasz Person is a 67 y.o. male with Siewert I esophageal adenocarcinoma, clinical stage T3N0M0.  Enrolled in clinical trial, finishing up chemoRT.  Overall doing well and tolerating treatment.  Moderate dysphagia and odynophagia, minimal weight loss. Discussed pre-op activity and healing prior to surgery on 3/14/23.    - Robotic esophagectomy scheduled 3/14/23. Consent obtained in clinic today  - Right buttock abscess incised and drained  - Rx bactrim x5 days sent to patient's pharmacy  - Patient instructed to replace gauze and Kerlix tonight and to keep packing in place tonight, can remove packing tomorrow      Follow-up: No follow-ups on file.       Jessica Francois MD  General Surgery, PGY-1  [facility]    "

## 2023-02-27 NOTE — PROCEDURES
"Incision & Drainage right perirectal abscess    Date/Time: 2/27/2023 11:00 AM  Performed by: Santana Brown Jr., MD  Authorized by: Santana Brown Jr., MD     Time out: Immediately prior to procedure a "time out" was called to verify the correct patient, procedure, equipment, support staff and site/side marked as required.    Consent Done?:  Yes (Verbal)    Type:  Abscess  Body area:  Anogenital  Location details:  Perianal  Anesthesia:  Local infiltration  Local anesthetic: Lidocaine 1% with epinephrine  Anesthetic total (ml):  5  Scalpel size:  11  Incision type:  Single straight  Incision depth: subcutaneous    Complexity:  Simple  Drainage:  Pus  Drainage amount:  Moderate  Wound treatment:  Incision, drainage and wound packed  Packing material:  1/4 in iodoform gauze  Patient tolerance:  Patient tolerated the procedure well with no immediate complications    Right perirectal abscess incision and drainage.    Wound instructions given prior to discharge  Dressing: Iodoform packing and 4 x 4 gauze           Santana Brown Jr, MD      Surgical Oncology      2/27/2023    "

## 2023-02-27 NOTE — PROGRESS NOTES
Surgical Oncology Clinic Note  [facility]       Referring Provider: No ref. provider found   PCP: Kirk Wilson MD    Reason For Visit: No chief complaint on file.      Oncologic History:  8/2022: PCP for progressive dysphagia  11/17/22: EGD:nodular mucosa in the esophagus at the GE junction. Esophageal mucosal changes suggestive of short-segment Paredes's esophagus. Biopsies positive for adenocarcinoma.   12/7/22: EUS: Partially obstructing, malignant esophageal tumor was found in the lower third of the esophagus staged T3 N0 Mx by endosonographic criteria.   12/8/22: PET CT: Distal esophageal wall thickening and hypermetabolic activity in keeping with known esophageal cancer with no definite evidence of metastatic disease      History of Present Illness:  Mr. Person is a 67 y.o. male who presents with newly diagnosed esophageal adenocarcinoma of the GE junction. He reports that beginning in late July 2022 he noticed trouble swallowing certain foods- 8/2022 saw his PCP as this problem persisted. EGD noted a nodular mucosal change at  the GE junction. Biopsy taken revealed adenocarcinoma. Subsequent EUS noted a partially obstructing tumor in the lower third of the esophagus that was staged T3N0Mx by endosonographic criteria.      He reports that he is able to eat many food but avoids foods such as steak and breads as well as some liquids and carbonated beverages. He has lost 3-5 lbs since July. Reports usual activity and energy level. Denies N/V/D, chest pain, SOB.       Interval Since Last Visit:   Lukasz Person returns today for follow-up after 4 cycles of neoadj chemo and radiation that began 12/29/22.  Final RT 2/1/23, last cycles of chemo held for thrombocytopenia.  Has been cleared by cardiology - does not need repeat echo (EF 60% <1 year ago) and to hold Xarelto 3 days prior.  Scheduled for PFTs 2/27/23.  Scheduled for re-staging imaging 3/1 and 3/2.     Doing well with chemoRT. Having indigestion relieved  "with tums and hiccups with eating. Having odynophagia. Lost about 12lbs. Overall doing well with treatment.      2/27/23:  Here for pre op for robotic esophagectomy. Discussed surgery, hospital stay, recovery, lifestyle changes in great detail with patient and wife. He is reporting a "boil" on his right buttock that he noticed starting Thursday after a long drive to [place] last week. He reports a tender area that has not improved and has noted some pinkish drainage from the site. Denies fever, chills, nausea, vomiting, abdominal pain. Still reporting odynophagia.    Pathology:  11/17/22: From EGD:   Final Pathologic Diagnosis        Abnormal   Gastroesophageal junction, biopsy:   - Adenocarcinoma.   - Background intestinal metaplasia with low and high-grade dysplasia.   - Focally suspicious for lymphovascular invasion.   - MMR pending, results will be issued in addendum.   Immunohistochemistry (IHC) Testing for Mismatch Repair (MMR) Proteins:   MLH1 - Intact nuclear expression   MSH2 - Intact nuclear expression   MSH6 - Intact nuclear expression   PMS2 - Intact nuclear expression   Background nonneoplastic tissue/internal control with intact nuclear   expression   IHC Interpretation   No loss of nuclear expression of MMR proteins: low probability of   microsatellite instability     Staging:  Cancer Staging   Esophageal adenocarcinoma  Staging form: Esophagus - Adenocarcinoma, AJCC 8th Edition  - Clinical stage from 11/17/2022: Stage III (cT3, cN0, cM0) - Signed by Javier Moulton MD on 12/14/2022         Current Outpatient Medications:     ACCU-CHEK SOFTCLIX LANCETS Misc, USE EVERY DAY, Disp: 100 each, Rfl: 6    allopurinoL (ZYLOPRIM) 100 MG tablet, TAKE 1 TABLET BY MOUTH EVERY DAY, Disp: 30 tablet, Rfl: 10    blood sugar diagnostic (ACCU-CHEK ANTONELLA PLUS TEST STRP) Strp, TEST EVERY MORNING, Disp: 50 strip, Rfl: 11    blood-glucose meter kit, Checks blood sugars 1x/daily., Disp: 1 each, Rfl: 12    citric " acid-potassium citrate (POLYCITRA) 1,100-334 mg/5 mL solution, Take 5 mLs (10 mEq total) by mouth 3 (three) times daily with meals., Disp: 450 mL, Rfl: 6    glimepiride (AMARYL) 2 MG tablet, TAKE 2 TABLETS BY MOUTH EVERY MORNING, Disp: 180 tablet, Rfl: 2    lisinopriL-hydrochlorothiazide (PRINZIDE,ZESTORETIC) 20-25 mg Tab, TAKE 1 TABLET BY MOUTH EVERY DAY, Disp: 90 tablet, Rfl: 3    metFORMIN 500 mg/5 mL Soln, Take 1,000 mg by mouth 2 (two) times a day., Disp: 1800 mL, Rfl: 3    metoprolol succinate (TOPROL-XL) 25 MG 24 hr tablet, Take 1 tablet (25 mg total) by mouth once daily., Disp: 30 tablet, Rfl: 11    omeprazole (PRILOSEC) 40 MG capsule, Take 1 capsule (40 mg total) by mouth once daily., Disp: 90 capsule, Rfl: 3    rivaroxaban (XARELTO) 20 mg Tab, Take 1 tablet (20 mg total) by mouth daily with dinner or evening meal., Disp: 30 tablet, Rfl: 11    rosuvastatin (CRESTOR) 20 MG tablet, TAKE 1 TABLET(20 MG) BY MOUTH EVERY DAY, Disp: 30 tablet, Rfl: 11    tamsulosin (FLOMAX) 0.4 mg Cap, Take 1 capsule (0.4 mg total) by mouth once daily., Disp: 30 capsule, Rfl: 11    testosterone (ANDROGEL) 20.25 mg/1.25 gram (1.62 %) GlPm, Apply 4 pumps to shoulders daily, Disp: 2 each, Rfl: 5    diphenhydrAMINE-aluminum-magnesium hydroxide-simethicone-LIDOcaine HCl 2%, Swish and swallow 10 mLs every 6 (six) hours as needed (pain swallowing)., Disp: 360 each, Rfl: 0    magic mouthwash diphen/antac/lidoc, Swish and swallow 10 ML's every 6 hours as needed (pain swallowing), Disp: 360 mL, Rfl: 0    nitroGLYCERIN (NITROSTAT) 0.4 MG SL tablet, Place 1 tablet (0.4 mg total) under the tongue every 5 (five) minutes as needed for Chest pain. If you need a third tablet, call 911, Disp: 60 tablet, Rfl: 12    sulfamethoxazole-trimethoprim 400-80mg (BACTRIM,SEPTRA) 400-80 mg per tablet, Take 1 tablet by mouth 2 (two) times daily. for 5 days, Disp: 10 tablet, Rfl: 0    Review of patient's allergies indicates:  No Known Allergies    Past Medical  History:   Diagnosis Date    Anticoagulant long-term use     Diabetes mellitus     Onset late 50s/early 60s    Hyperlipidemia     Hypertension     Onset late 50s/early 60s    Kidney stones     Sleep apnea     since 2006       Past Surgical History:   Procedure Laterality Date    CORONARY ANGIOGRAPHY N/A 9/30/2020    Procedure: ANGIOGRAM, CORONARY ARTERY;  Surgeon: John West MD;  Location: [facility] CATH LAB;  Service: Cardiology;  Laterality: N/A;    CYSTOSCOPY N/A 2/17/2022    Procedure: CYSTOSCOPY;  Surgeon: Lukasz Hughes MD;  Location: [facility] OR [address] [place] [address];  Service: Urology;  Laterality: N/A;    CYSTOSCOPY W/ URETERAL STENT PLACEMENT N/A 2/25/2022    Procedure: CYSTOSCOPY, WITH URETERAL STENT INSERTION;  Surgeon: Lukasz Hughes MD;  Location: [address];  Service: Urology;  Laterality: N/A;    ENDOSCOPIC ULTRASOUND OF UPPER GASTROINTESTINAL TRACT N/A 12/7/2022    Procedure: ULTRASOUND, UPPER GI TRACT, ENDOSCOPIC;  Surgeon: Darian Main MD;  Location: [facility];  Service: Endoscopy;  Laterality: N/A;  Approval to hold Xarelto rec'd from Dr. Nick (see t/e 12/5/22)-DS    ESOPHAGOGASTRODUODENOSCOPY N/A 11/17/2022    Procedure: EGD (ESOPHAGOGASTRODUODENOSCOPY);  Surgeon: Brody Gonzales MD;  Location: [facility] ([address] [place] [address]);  Service: Endoscopy;  Laterality: N/A;  inst via email-ok to hold Xarelto x 2 days-MS    LASER LITHOTRIPSY Left 2/25/2022    Procedure: LITHOTRIPSY, USING LASER;  Surgeon: Lukasz Hughes MD;  Location: [address];  Service: Urology;  Laterality: Left;    LEFT HEART CATHETERIZATION Left 9/30/2020    Procedure: Left heart cath;  Surgeon: John West MD;  Location: [facility] CATH LAB;  Service: Cardiology;  Laterality: Left;    LITHOTRIPSY      PARATHYROIDECTOMY  1/1/2-107    PYELOSCOPY Left 2/25/2022    Procedure: PYELOSCOPY;  Surgeon: Lukasz Hughes MD;  Location: [address];  Service: Urology;  Laterality: Left;    TRANSESOPHAGEAL ECHOCARDIOGRAPHY N/A  2022    Procedure: ECHOCARDIOGRAM, TRANSESOPHAGEAL;  Surgeon: Xander Diagnostic Provider;  Location: [facility] EP LAB;  Service: Cardiology;  Laterality: N/A;    TREATMENT OF CARDIAC ARRHYTHMIA N/A 2022    Procedure: Cardioversion or Defibrillation;  Surgeon: Iris Fisher NP;  Location: [facility] EP LAB;  Service: Cardiology;  Laterality: N/A;  afib, dccv, artie, anes, EH, 3prep    URETEROSCOPIC REMOVAL OF URETERIC CALCULUS Left 2022    Procedure: REMOVAL, CALCULUS, URETER, URETEROSCOPIC;  Surgeon: Lukasz Hughes MD;  Location: [facility] OR [address] [place] [address];  Service: Urology;  Laterality: Left;    URETEROSCOPY Left 2022    Procedure: URETEROSCOPY;  Surgeon: Lukasz Hughes MD;  Location: [facility] OR [address] [place] [address];  Service: Urology;  Laterality: Left;       Family History   Problem Relation Age of Onset    Arthritis Mother     Heart disease Father 70        CABG    Arthritis Father     Diabetes Father     Kidney disease Father         had one kidney removed in early thirties    Arthritis Sister     Arthritis Sister     Hypertension Sister     Colon cancer Brother 32    Arthritis Brother     Alcohol abuse Paternal Aunt     Cancer Maternal Grandfather         throat cancer    Diabetes Paternal Grandmother     Colon polyps Paternal Grandfather     Colon cancer Paternal Grandfather     Cancer Paternal Grandfather         colon cancer at age 62       Social History     Socioeconomic History    Marital status:      Spouse name: Amanda   Tobacco Use    Smoking status: Former     Packs/day: 1.00     Years: 7.00     Pack years: 7.00     Types: Cigarettes, Cigars     Start date: 1972     Quit date:      Years since quittin.8     Passive exposure: Never    Smokeless tobacco: Never    Tobacco comments:     smoking was off and on.  cumulative 7 years.  never more than three years in one stretch or more lj   Substance and Sexual Activity    Alcohol use: Yes     Alcohol/week: 3.0 standard drinks     Types: 2 Cans of  beer, 1 Shots of liquor per week     Comment: don't drink regularly.  6 to 7 monthly    Drug use: Not Currently     Types: Marijuana    Sexual activity: Not Currently     Partners: Female     Birth control/protection: None     Comment:      Social Determinants of Health     Financial Resource Strain: Low Risk     Difficulty of Paying Living Expenses: Not hard at all   Food Insecurity: No Food Insecurity    Worried About Running Out of Food in the Last Year: Never true    Ran Out of Food in the Last Year: Never true   Transportation Needs: No Transportation Needs    Lack of Transportation (Medical): No    Lack of Transportation (Non-Medical): No   Physical Activity: Sufficiently Active    Days of Exercise per Week: 5 days    Minutes of Exercise per Session: 40 min   Stress: No Stress Concern Present    Feeling of Stress : Only a little   Social Connections: Unknown    Frequency of Communication with Friends and Family: Once a week    Frequency of Social Gatherings with Friends and Family: Once a week    Active Member of Clubs or Organizations: Yes    Attends Club or Organization Meetings: More than 4 times per year    Marital Status:    Housing Stability: Low Risk     Unable to Pay for Housing in the Last Year: No    Number of Places Lived in the Last Year: 1    Unstable Housing in the Last Year: No             Vitals:    02/27/23 1124   BP: (!) 122/58   Pulse: 76     Body mass index is 32.56 kg/m².  Physical Exam  Constitutional:       General: He is not in acute distress.     Appearance: He is not ill-appearing.   Cardiovascular:      Rate and Rhythm: Normal rate.   Pulmonary:      Effort: Pulmonary effort is normal. No respiratory distress.   Abdominal:      General: There is no distension.   Genitourinary:         Comments: Abscess inferior to anus on right buttock  Erythema, induration with purulence expressed from punctum  Skin:     General: Skin is warm and dry.   Neurological:      General: No  focal deficit present.      Mental Status: He is alert.   Psychiatric:         Mood and Affect: Mood normal.         Behavior: Behavior normal.          DATA REVIEW:  I reviewed the following records for this visit: lab work from prior visit, notes from other physicians, surgical pathology results, endoscopy results, radiographic study evaluation, and laboratory results done by primary care physician      Lab Results   Component Value Date    WBC 3.42 (L) 01/30/2023    HGB 13.1 (L) 01/30/2023    HCT 40.5 01/30/2023    PLT 87 (L) 01/30/2023    CHOL 107 (L) 11/07/2022    TRIG 218 (H) 11/07/2022    HDL 34 (L) 11/07/2022    LDLCALC 29.4 (L) 11/07/2022    ALT 30 01/30/2023    AST 31 01/30/2023     (L) 01/30/2023    K 4.6 01/30/2023     01/30/2023    CREATININE 1.3 01/30/2023    BUN 24 (H) 01/30/2023    CO2 22 (L) 01/30/2023    TSH 1.514 01/19/2023    PSA 1.2 10/06/2021    INR 1.1 04/01/2022    HGBA1C 6.9 (H) 11/07/2022       No results found for: CEA, , , AFP     Radiology:   Final Pathologic Diagnosis   Date/Time Value Ref Range Status   11/17/2022 08:54 AM (A)  Corrected    Gastroesophageal junction, biopsy:  - Adenocarcinoma.  - Background intestinal metaplasia with low and high-grade dysplasia.  - Focally suspicious for lymphovascular invasion.  - MMR pending, results will be issued in addendum.  - See comment.  COMMENT:  The biopsy shows adenocarcinoma with single cell infiltration of  the lamina propria highlighted on cytokeratin stain arising in a background  of intestinal metaplasia with low and high-grade dysplasia.  There is an area  suspicious for lymphovascular invasion on routine H&E sections however this  area is not confirmed on levels or immunostain for CD 34.  Case is reviewed  by Neva Camargo and RYAN Gauthier who agree with the above diagnosis.  Appropriate positive controls are examined.       Comment:     Interp By Idania Flores MD, Signed on 12/05/2022 at 11:05       Procedure  "  2/27/23  Anesthesia: local-xylocaine 1%  Location: right buttock, medial; inferior to anus  Verbal consent obtained  Area prepped with Chloraprep  11-blade used to make incision into abscess cavity with expression of copious pus. Wound cultures sent from cavity.  Wound packed with 1/4" gauze soaked in iodine, 4x4 gauze and Kerlix placed over top  Patient tolerated procedure well, no immediate post procedure complications      ASSESSMENT & PLAN:  Lukasz Person is a 67 y.o. male with Siewert I esophageal adenocarcinoma, clinical stage T3N0M0.  Enrolled in clinical trial, finishing up chemoRT.  Overall doing well and tolerating treatment.  Moderate dysphagia and odynophagia, minimal weight loss. Discussed pre-op activity and healing prior to surgery on 3/14/23.    - Robotic esophagectomy scheduled 3/14/23. Consent obtained in clinic today  - Right buttock abscess incised and drained  - Rx bactrim x5 days sent to patient's pharmacy  - Patient instructed to replace gauze and Kerlix tonight and to keep packing in place tonight, can remove packing tomorrow      Follow-up: No follow-ups on file.       Jessica Francois MD  General Surgery, PGY-1  [facility]    "

## 2023-03-01 ENCOUNTER — HOSPITAL ENCOUNTER (OUTPATIENT)
Dept: RADIOLOGY | Facility: HOSPITAL | Age: 68
Discharge: HOME | End: 2023-03-01
Attending: INTERNAL MEDICINE
Payer: MEDICARE

## 2023-03-01 DIAGNOSIS — Z00.6 CLINICAL TRIAL PARTICIPANT: ICD-10-CM

## 2023-03-01 DIAGNOSIS — C15.9 ESOPHAGEAL ADENOCARCINOMA: ICD-10-CM

## 2023-03-01 LAB — BACTERIA SPEC AEROBE CULT: NORMAL

## 2023-03-01 PROCEDURE — 25500020 PHARM REV CODE 255: Performed by: INTERNAL MEDICINE

## 2023-03-01 PROCEDURE — 78815 NM PET CT ROUTINE: ICD-10-PCS | Mod: 26,Q1,PS, | Performed by: RADIOLOGY

## 2023-03-01 PROCEDURE — 78815 PET IMAGE W/CT SKULL-THIGH: CPT | Mod: 26,Q1,PS, | Performed by: RADIOLOGY

## 2023-03-01 PROCEDURE — A9552 F18 FDG: HCPCS

## 2023-03-01 PROCEDURE — A9698 NON-RAD CONTRAST MATERIALNOC: HCPCS | Performed by: INTERNAL MEDICINE

## 2023-03-01 RX ADMIN — BARIUM SULFATE 900 ML: 20 SUSPENSION ORAL at 11:03

## 2023-03-01 NOTE — PATIENT INSTRUCTIONS
Counseling and Referral of Other Preventative  (Italic type indicates deductible and co-insurance are waived)    Patient Name: Lukasz Person  Today's Date: 9/15/2020    Health Maintenance       Date Due Completion Date    Hepatitis C Screening ORDERED ---    Abdominal Aortic Aneurysm Screening   ORDERED   ---    Pneumococcal Vaccine (65+ Low/Medium Risk) (2 of 2 - PPSV23) CDC HANDOUT GIVEN 6/24/2019    Influenza Vaccine (1) ALREADY DONE   2/27/2020    Colorectal Cancer Screening 1/1/2016-  DISCUSS [facility]  FIT TEST WILL BE MAILED TO YOU'   ---    TETANUS VACCINE IN THE FUTURE FOR INJURY   ---    Shingles Vaccine (1 of 2) CDC HANDOUT GIVEN   ---    Hemoglobin A1c 02/20/2021 8/20/2020    Lipid Panel 03/04/2021   3/4/2020    Eye Exam 03/06/2021   3/6/2020    Foot Exam 09/11/2021 9/11/2020    Aspirin/Antiplatelet Therapy    CARDIOLOGY-CURRENT  EYE EXAM- CURRENT  SLEEP MEDICINE- CURRENT  NEPHROLOGY- CURRENT    DIABETIC EDUCATION- DIABETIC EMPOWERMENT CLASSES-    BENJIE SONG NP- DIABETES    WEIGHT LOSS- CHECK WITH INSURANCE BENEFITS FOR WEIGHT LOSS PROGRAM OR BARIATRIC MEDICINE PROGRAM    EXERCISE, SIT & BE FIT TV EXERCISE PROGRAM-CHECK WEBSITE    ANNUAL PCP- SCHEDULED    OBESITY CODE- BOOK- LIBRARY- AUTHOR-PHANI       09/15/2021   9/15/2020        Orders Placed This Encounter   Procedures    Hepatitis C Antibody    CV AAA Screening     The following information is provided to all patients.  This information is to help you find resources for any of the problems found today that may be affecting your health:                Living healthy guide: www.[facility].gov      Understanding Diabetes: www.diabetes.org      Eating healthy: www.cdc.gov/healthyweight      CDC home safety checklist: www.cdc.gov/steadi/patient.html      Agency on Aging: www.goea.[place].[facility]      Alcoholics anonymous (AA): www.aa.org      Physical Activity: www.josselyn.nih.gov/wn8upjt      Tobacco use: www.quitwithusla.org     Understanding  Subjective   Patient ID: Jodie is a 51 year old female.    Chief Complaint   Patient presents with   • Headache     Patient states she has a sharp pain in her left side of her head .  For x8days . Took Tylenol .      Headache   This is a new problem. The current episode started 1 to 4 weeks ago. The problem occurs daily. The problem has been unchanged. Pain location: generalized. The pain does not radiate. The pain quality is not similar to prior headaches. Quality: pressure. The pain is at a severity of 5/10. The pain is moderate. Associated symptoms include dizziness and a loss of balance. Pertinent negatives include no abdominal pain, abnormal behavior, anorexia, back pain, blurred vision, coughing, drainage, ear pain, eye pain, eye redness, eye watering, facial sweating, fever, hearing loss, insomnia, muscle aches, nausea, neck pain, numbness, phonophobia, photophobia, rhinorrhea, scalp tenderness, seizures, sinus pressure, sore throat, swollen glands, tingling, tinnitus, visual change, vomiting, weakness or weight loss. Nothing aggravates the symptoms. She has tried acetaminophen for the symptoms. The treatment provided mild relief. Her past medical history is significant for hypertension and obesity. There is no history of cancer, cluster headaches, immunosuppression, migraine headaches, migraines in the family, pseudotumor cerebri, recent head traumas, sinus disease or TMJ.         Past Medical History:   Diagnosis Date   • Bronchitis    • Diabetes mellitus (CMS/HCC)    • HTN (hypertension)    • No known problems        MEDICATIONS:  Current Outpatient Medications   Medication Sig   • albuterol (ProAir HFA) 108 (90 Base) MCG/ACT inhaler Inhale 2 puffs into the lungs every 4 hours as needed for Shortness of Breath or Wheezing.   • lisinopril (ZESTRIL) 5 MG tablet Take 5 mg by mouth daily.   • atorvastatin (LIPITOR) 10 MG tablet Take 10 mg by mouth daily.   • metFORMIN (GLUCOPHAGE) 500 MG tablet Take 500 mg  Carbohydrates, Fats, and Protein  Food is a source of fuel and nourishment for your body. Its also a source of pleasure. Having diabetes doesnt mean you have to eat special foods or give up desserts. Instead, your dietitian can show you how to plan meals to suit your body. To start, learn how different foods affect blood sugar.  Carbohydrates  Carbohydrates are the main source of fuel for the body. Carbohydrates raise blood sugar. Many people think carbohydrates are only found in pasta or bread. But carbohydrates are actually in many kinds of foods:  · Sugars occur naturally in foods such as fruit, milk, honey, and molasses. Sugars can also be added to many foods, from cereals and yogurt to candy and desserts. Sugars raise blood sugar.  · Starches are found in bread, cereals, pasta, and dried beans. Theyre also found in corn, peas, potatoes, yam, acorn squash, and butternut squash. Starches also raise blood sugar.   · Fiber is found in foods such as vegetables, fruits, beans, and whole grains. Unlike other carbs, fiber isnt digested or absorbed. So it doesnt raise blood sugar. In fact, fiber can help keep blood sugar from rising too fast. It also helps keep blood cholesterol at a healthy level.  Did you know?  Even though carbohydrates raise blood sugar, its best to have some in every meal. They are an important part of a healthy diet.   Fat  Fat is an energy source that can be stored until needed. Fat does not raise blood sugar. However, it can raise blood cholesterol, increasing the risk of heart disease. Fat is also high in calories, which can cause weight gain. Not all types of fat are the same.  More Healthy:  · Monounsaturated fats are mostly found in vegetable oils, such as olive, canola, and peanut oils. They are also found in avocados and some nuts. Monounsaturated fats are healthy for your heart. Thats because they lower LDL (unhealthy) cholesterol.  · Polyunsaturated fats are mostly found in  by mouth 3 times daily (with meals).   • amoxicillin-clavulanate (Augmentin) 875-125 MG per tablet Take 1 tablet by mouth every 12 hours. Take with food.   • cetirizine (ZyrTEC Allergy) 10 MG tablet Take 1 tablet by mouth daily.   • methylPREDNISolone (MEDROL DOSEPAK) 4 MG tablet follow package directions   • azithromycin (ZITHROMAX) 250 MG tablet Take two tabs on day 1. Take one tab on days 2-5.   • promethazine-dextromethorphan (PROMETHAZINE-DM) 6.25-15 MG/5ML syrup Take 5 mLs by mouth 4 times daily as needed for Cough.   • loratadine (Claritin) 10 MG tablet Take 1 tablet by mouth daily.   • fluticasone (FLONASE) 50 MCG/ACT nasal spray Spray 2 sprays in each nostril daily.   • sodium chloride (OCEAN) 0.65 % nasal spray Spray 1 spray in each nostril as needed for Congestion.   • COVID-19 Specimen Collection Kit USE AS DIRECTED   • FREESTYLE LITE test strip USE TO TEST BLOOD SUGAR QD   • Valacyclovir HCl 1000 MG Tab TK 1 T PO TID FOR 7 DAYS   • naproxen (NAPROSYN) 500 MG tablet Take 1 tablet by mouth 2 times daily (with meals).   • cyclobenzaprine (FLEXERIL) 10 MG tablet Take 0.5 tablets by mouth 3 times daily as needed for Muscle spasms.   • lidocaine (LIDODERM) 5 % patch Place 1 patch onto the skin every 12 hours. Remove patch 12 hours after applying   • HYDROcodone-acetaminophen (NORCO) 5-325 MG per tablet Take 1-2 tablets by mouth every 4 hours as needed for Pain.   • dapagliflozin (Farxiga) 10 MG tablet Take 10 mg by mouth.     No current facility-administered medications for this visit.       ALLERGIES:  ALLERGIES:  No Known Allergies    PAST SURGICAL HISTORY:  Past Surgical History:   Procedure Laterality Date   • Endometrial ablation thermal     • No past surgeries     • Rotator cuff repair         FAMILY HISTORY:  Family History   Problem Relation Age of Onset   • Dementia/Alzheimers Mother    • Myocardial Infarction Mother    • Congestive Heart Failure Father    • Congestive Heart Failure Sister    • Lung  Disease Sister    • Stroke Sister        SOCIAL HISTORY:  Social History     Tobacco Use   • Smoking status: Current Every Day Smoker   • Smokeless tobacco: Never Used   Substance Use Topics   • Alcohol use: Not Currently   • Drug use: Never         Patient's medications, allergies, past medical, surgical, and social history  were reviewed and updated as appropriate.    Review of Systems   Constitutional: Negative for fever and weight loss.   HENT: Negative for congestion, ear pain, hearing loss, rhinorrhea, sinus pressure, sore throat and tinnitus.    Eyes: Negative for blurred vision, photophobia, pain and redness.   Respiratory: Negative for cough.    Gastrointestinal: Negative for abdominal pain, anorexia, nausea and vomiting.   Musculoskeletal: Negative for back pain and neck pain.   Neurological: Positive for dizziness, headaches and loss of balance. Negative for tingling, seizures, syncope, weakness, light-headedness and numbness.   Psychiatric/Behavioral: The patient does not have insomnia.    All other systems reviewed and are negative.      Objective   Physical Exam  Vitals and nursing note reviewed.   Constitutional:       General: She is not in acute distress.     Appearance: Normal appearance. She is normal weight. She is not ill-appearing, toxic-appearing or diaphoretic.   HENT:      Head: Normocephalic and atraumatic.      Right Ear: Hearing, ear canal and external ear normal. A middle ear effusion is present.      Left Ear: Hearing, ear canal and external ear normal. A middle ear effusion is present.      Nose: No mucosal edema, congestion or rhinorrhea.      Right Turbinates: Enlarged and swollen. Not pale.      Left Turbinates: Enlarged and swollen. Not pale.      Right Sinus: Frontal sinus tenderness present. No maxillary sinus tenderness.      Left Sinus: Frontal sinus tenderness present. No maxillary sinus tenderness.      Mouth/Throat:      Mouth: Mucous membranes are moist.      Pharynx:  vegetable oils, such as corn, safflower, and soybean oils. They are also found in some seeds, nuts, and fish. Polyunsaturated fats lower LDL (unhealthy) cholesterol. So, choosing them instead of saturated fats is healthy for your heart. Certain unsaturated fats can help lower triglycerides.   Less Healthy:  · Saturated fats are found in animal products, such as meat, poultry, whole milk, lard, and butter. Saturated fats raise LDL cholesterol and are not healthy for your heart.  · Hydrogenated oils and trans fats are formed when vegetable oils are processed into solid fats. They are found in many processed foods. Hydrogenated oils and trans fats raise LDL cholesterol and lower HDL (healthy) cholesterol. They are not healthy for your heart.  Protein  Protein helps the body build and repair muscle and other tissue. Protein has little or no effect on blood sugar. However, many foods that contain protein also contain saturated fat. By choosing low-fat protein sources, you can get the benefits of protein without the extra fat:  · Plant protein is found in dry beans and peas, nuts, and soy products, such as tofu and soymilk. These sources tend to be cholesterol-free and low in saturated fat.  · Animal protein is found in fish, poultry, meat, cheese, milk, and eggs. These contain cholesterol and can be high in saturated fat. Aim for lean, lower-fat choices.  Date Last Reviewed: 3/1/2016  © 6905-8989 [employer]. [address] [place] [address]. All rights reserved. This information is not intended as a substitute for professional medical care. Always follow your healthcare professional's instructions.        Diet: Diabetes  Food is an important tool that you can use to control diabetes and stay healthy. Eating well-balanced meals in the correct amounts will help you control your blood glucose levels and prevent low blood sugar reactions. It will also help you reduce the health risks of diabetes.  Oropharynx is clear. No oropharyngeal exudate or posterior oropharyngeal erythema.   Eyes:      Extraocular Movements: Extraocular movements intact.      Conjunctiva/sclera: Conjunctivae normal.      Pupils: Pupils are equal, round, and reactive to light.   Cardiovascular:      Rate and Rhythm: Normal rate and regular rhythm.      Pulses: Normal pulses.      Heart sounds: Normal heart sounds.   Pulmonary:      Effort: Pulmonary effort is normal.      Breath sounds: Normal breath sounds.   Skin:     General: Skin is warm and dry.      Capillary Refill: Capillary refill takes less than 2 seconds.   Neurological:      General: No focal deficit present.      Mental Status: She is alert and oriented to person, place, and time.      Cranial Nerves: No cranial nerve deficit.      Sensory: No sensory deficit.      Motor: No weakness.      Coordination: Coordination normal.      Gait: Gait normal.   Psychiatric:         Mood and Affect: Mood normal.         Behavior: Behavior normal.         Thought Content: Thought content normal.         Judgment: Judgment normal.       Visit Vitals  /80 (BP Location: LUE - Left upper extremity, Patient Position: Sitting, Cuff Size: Regular)   Pulse 87   Temp 98.6 °F (37 °C) (Oral)   Resp 18   Ht 5' 5\" (1.651 m)   Wt 108 kg (238 lb 1.6 oz)   LMP  (LMP Unknown)   SpO2 99%   BMI 39.62 kg/m²       Assessment   Problem List Items Addressed This Visit     None      Visit Diagnoses     Acute sinusitis, recurrence not specified, unspecified location    -  Primary    Relevant Medications    amoxicillin-clavulanate (Augmentin) 875-125 MG per tablet    cetirizine (ZyrTEC Allergy) 10 MG tablet    methylPREDNISolone (MEDROL DOSEPAK) 4 MG tablet          This is a 51 year old year-old female who presents with with stated pmhx of htn and as seen above with headache daily for more than 2 weeks. She states she has headaches chronically daily but generally resolve. Associated symptoms include  There is no one specific diet that is right for everyone with diabetes. But there are general guidelines to follow. A registered dietitian (RD) will create a tailored diet approach thats just right for you. He or she will also help you plan healthy meals and snacks. If you have any questions, call your dietitian for advice.     Guidelines for success  Talk with your healthcare provider before starting a diabetes diet or weight loss program. If you haven't talked with a dietitian yet, ask your provider for a referral. The following guidelines can help you succeed:  · Select foods from the 6 food groups below. Your dietitian will help you find food choices within each group. He or she will also show you serving sizes and how many servings you can have at each meal.  ¨ Grains, beans, and starchy vegetables  ¨ Vegetables  ¨ Fruit  ¨ Milk or yogurt  ¨ Meat, poultry, fish, or tofu  ¨ Healthy fats  · Check your blood sugar levels as directed by your provider. Take any medicine as prescribed by your provider.  · Learn to read food labels and pick the right portion sizes.  · Eat only the amount of food in your meal plan. Eat about the same amount of food at regular times each day. Dont skip meals. Eat meals 4 to 5 hours apart, with snacks in between.  · Limit alcohol. It raises blood sugar levels. Drink water or calorie-free diet drinks that use safe sweeteners.  · Eat less fat to help lower your risk of heart disease. Use nonfat or low-fat dairy products and lean meats. Avoid fried foods. Use cooking oils that are unsaturated, such as olive, canola, or peanut oil.  · Talk with your dietitian about safe sugar substitutes.  · Avoid added salt. It can contribute to high blood pressure, which can cause heart disease. People with diabetes already have a risk of high blood pressure and heart disease.  · Stay at a healthy weight. If you need to lose weight, cut down on your portion sizes. But dont skip meals. Exercise is an  dizziness, sinus pressure and scalp tenderness. She is neurologically intact, symptoms support sinusitis with diff of chronic headaches. Advised she follow up with her PCP.     Instructions provided as documented in the AVS.    Thank you for visiting [facility].  Please follow up with your PCP 2-3 days.       I have formulated a discharge action plan for this patient:     1) I have given the patient comprehensive discharge instructions and instructed them on the use of any OTC or RX medications involved in their discharge care.   2) I have carefully and comprehensively answered any questions and addressed any concerns that the patient had regarding their medical illness today and their discharge care and follow up.   3) I have carefully and repeatedly discussed with the patient that the patient needs follow up with a primary care provider or specialist, and as necessary I have given this information to the patient.   4)the patient feels comfortable going home at this time, the patient verbally expresses that the understand the discharge action plan and clearly understands to return, proceed to the ER or call 911 if symptoms worsen, and to absolutely follow up as described and directed above.     The provider verified that the discharge instructions and medications documented on this After Visit Summary report were reviewed with patient and/or caregiver.     The patient has appropriate transport home and has verbalized understanding of instructions given.    important part of any weight management program. Talk with your provider about an exercise program thats right for you.  · For more information about the best diet plan for you, talk with a registered dietitian (RD). To find an RD in your area, contact:  ¨ Academy of Nutrition and Dietetics www.eatright.org  ¨ The American Diabetes Association [phone] www.diabetes.org  Date Last Reviewed: 8/1/2016 © 2000-2017 [employer]. [address] [place] [address]. All rights reserved. This information is not intended as a substitute for professional medical care. Always follow your healthcare professional's instructions.        Gout Diet  Gout is a painful condition caused by an excess of uric acid, a waste product made by the body. Uric acid forms crystals that collect in the joints. The immune response to these crystals brings on symptoms of joint pain and swelling. This is called a gout attack. Often, medications and diet changes are combined to manage gout. Below are some guidelines for changing your diet to help you manage gout and prevent attacks. Your health care provider will help you determine the best eating plan for you.     Eating to manage gout  Weight loss for those who are overweight may help reduce gout attacks.  Eat less of these foods  Eating too many foods containing purines may raise the levels of uric acid in your body. This raises your risk for a gout attack. Try to limit these foods and drinks:  · Alcohol, such as beer and red wine. You may be told to avoid alcohol completely.  · Soft drinks that contain sugar or high fructose corn syrup  · Certain fish, including anchovies, sardines, fish eggs, and herring  · Shellfish  · Certain meats, such as red meat, hot dogs, luncheon meats, and turkey  · Organ meats, such as liver, kidneys, and sweetbreads  · Legumes, such as dried beans and peas  · Other high fat foods such as gravy, whole milk, and high fat cheeses  · Vegetables  such as asparagus, cauliflower, spinach, and mushrooms used to be thought to contribute to an increased risk for a gout attack, but recent studies show that high purine vegetables don't increase the risk for a gout attack.  Eat more of these foods  Other foods may be helpful for people with gout. Add some of these foods to your diet:  · Cherries contain chemicals that may lower uric acid.  · Omega fatty acids. These are found in some fatty fish such as salmon, certain oils (flax, olive, or nut), and nuts themselves. Omega fatty acids may help prevent inflammation due to gout.  · Dairy products that are low-fat or fat-free, such as cheese and yogurt  · Complex carbohydrate foods, including whole grains, brown rice, oats, and beans  · Coffee, in moderation  · Water, approximately 64 ounces per day  Follow-up care  Follow up with your healthcare provider as advised.  When to seek medical advice  Call your healthcare provider right away if any of these occur:  · Return of gout symptoms, usually at night:  · Severe pain, swelling, and heat in a joint, especially the base of the big toe  · Affected joint is hard to move  · Skin of the affected joint is purple or red  · Fever of 100.4°F (38°C) or higher  · Pain that doesn't get better even with prescribed medicine   Date Last Reviewed: 1/12/2016  © 4897-6605 [employer]. [address] [place] [address]. All rights reserved. This information is not intended as a substitute for professional medical care. Always follow your healthcare professional's instructions.        Kidney Stone (Urine)  Does this test have other names?  Urine stone risk profile, 24-hour collection  What is this test?  This test checks your urine for chemicals that might cause your body to form kidney stones. The test also looks for blood in your urine, which can be a symptom of kidney stones.  Kidney stones are hard masses of minerals and salts that can form in your kidneys. They  can be as small as a grain of sand or more than an inch in diameter. Usually theses stones or crystals pass through your body when you urinate. But sometimes they can get stuck in your urinary tract and cause a lot of pain.  Why do I need this test?  You may need this test if your healthcare provider suspects that you have kidney stones. Symptoms of kidney stones include:  · Pain in your lower belly (abdomen) or side  · Nausea and vomiting  · Sudden, strong urge to urinate  · Pain when urinating  · Blood in your urine  You may also have this test if you had a kidney stone or you are being treated for kidney stones. If you have had a kidney stone or any treatments for a kidney stone, you should wait 1 to 2 months, or until you have completely recovered, before having this test.  You will need to repeat the test at least twice so your healthcare provider can compare the results.  What other tests might I have along with this test?  Your healthcare provider may also order imaging tests. These include an ultrasound, CT scan and, a special type of X-ray (pyelogram) that uses a dye to look for kidney stones.  Your provider is also likely to order blood tests, to look for calcium, phosphate, uric acid, oxalate, and citrate. These are some of the chemicals that are most likely to cause your body to form kidney stones.  What do my test results mean?  Many things may affect your lab test results. These include the method each lab uses to do the test. Even if your test results are different from the normal value, you may not have a problem. To learn what the results mean for you, talk with your healthcare provider.  The results will show whether your urine has high or low levels of the chemicals that are most likely to cause stones to form. These chemicals are calcium, phosphate, uric acid, oxalate, and citrate.  If your levels are not normal, it may mean that you have a kidney stone or stones.  Abnormal levels may also mean  that you have another kidney disorder, such as a urinary tract infection.  How is this test done?  This test requires a 24-hour urine sample. For this sample, you must collect all of your urine for 24 hours. Empty your bladder completely first in the morning without collecting it and note the time. Then collect your urine every time you go to the bathroom over the next 24 hours. As you collect the urine, store it in the refrigerator.  Does this test pose any risks?  This test does not pose any known risks.  What might affect my test results?  Having this test too soon after treatment for a previous kidney stone can affect your results. You should wait several months after treatment before having this test.  How do I get ready for this test?  You don't need to prepare for this test.    © 8658-8517 [employer]. [address] [place] [address], [place] [address]. All rights reserved. This information is not intended as a substitute for professional medical care. Always follow your healthcare professional's instructions.        Kidney Stone (Urine)  Does this test have other names?  Urine stone risk profile, 24-hour collection  What is this test?  This test checks your urine for chemicals that might cause your body to form kidney stones. The test also looks for blood in your urine, which can be a symptom of kidney stones.  Kidney stones are hard masses of minerals and salts that can form in your kidneys. They can be as small as a grain of sand or more than an inch in diameter. Usually theses stones or crystals pass through your body when you urinate. But sometimes they can get stuck in your urinary tract and cause a lot of pain.  Why do I need this test?  You may need this test if your healthcare provider suspects that you have kidney stones. Symptoms of kidney stones include:  · Pain in your lower belly (abdomen) or side  · Nausea and vomiting  · Sudden, strong urge to urinate  · Pain when urinating  · Blood in  your urine  You may also have this test if you had a kidney stone or you are being treated for kidney stones. If you have had a kidney stone or any treatments for a kidney stone, you should wait 1 to 2 months, or until you have completely recovered, before having this test.  You will need to repeat the test at least twice so your healthcare provider can compare the results.  What other tests might I have along with this test?  Your healthcare provider may also order imaging tests. These include an ultrasound, CT scan and, a special type of X-ray (pyelogram) that uses a dye to look for kidney stones.  Your provider is also likely to order blood tests, to look for calcium, phosphate, uric acid, oxalate, and citrate. These are some of the chemicals that are most likely to cause your body to form kidney stones.  What do my test results mean?  Many things may affect your lab test results. These include the method each lab uses to do the test. Even if your test results are different from the normal value, you may not have a problem. To learn what the results mean for you, talk with your healthcare provider.  The results will show whether your urine has high or low levels of the chemicals that are most likely to cause stones to form. These chemicals are calcium, phosphate, uric acid, oxalate, and citrate.  If your levels are not normal, it may mean that you have a kidney stone or stones.  Abnormal levels may also mean that you have another kidney disorder, such as a urinary tract infection.  How is this test done?  This test requires a 24-hour urine sample. For this sample, you must collect all of your urine for 24 hours. Empty your bladder completely first in the morning without collecting it and note the time. Then collect your urine every time you go to the bathroom over the next 24 hours. As you collect the urine, store it in the refrigerator.  Does this test pose any risks?  This test does not pose any known  risks.  What might affect my test results?  Having this test too soon after treatment for a previous kidney stone can affect your results. You should wait several months after treatment before having this test.  How do I get ready for this test?  You don't need to prepare for this test.    © 3313-7208 [employer]. [address] [place] [address], [place] [address]. All rights reserved. This information is not intended as a substitute for professional medical care. Always follow your healthcare professional's instructions.         yes

## 2023-03-02 ENCOUNTER — HOSPITAL ENCOUNTER (OUTPATIENT)
Dept: RADIOLOGY | Facility: HOSPITAL | Age: 68
Discharge: HOME | End: 2023-03-02
Attending: INTERNAL MEDICINE
Payer: MEDICARE

## 2023-03-02 ENCOUNTER — TELEPHONE (OUTPATIENT)
Dept: SURGERY | Facility: CLINIC | Age: 68
End: 2023-03-02
Payer: MEDICARE

## 2023-03-02 DIAGNOSIS — C15.9 ESOPHAGEAL ADENOCARCINOMA: ICD-10-CM

## 2023-03-02 DIAGNOSIS — Z00.6 CLINICAL TRIAL PARTICIPANT: ICD-10-CM

## 2023-03-02 PROCEDURE — 74177 CT ABD & PELVIS W/CONTRAST: CPT | Mod: 26,Q1,, | Performed by: RADIOLOGY

## 2023-03-02 PROCEDURE — 74177 CT ABD & PELVIS W/CONTRAST: CPT | Mod: TC

## 2023-03-02 PROCEDURE — 71260 CT CHEST ABDOMEN PELVIS WITH CONTRAST (XPD): ICD-10-PCS | Mod: 26,Q1,, | Performed by: RADIOLOGY

## 2023-03-02 PROCEDURE — 71260 CT THORAX DX C+: CPT | Mod: TC

## 2023-03-02 PROCEDURE — 25500020 PHARM REV CODE 255: Mod: Q1 | Performed by: INTERNAL MEDICINE

## 2023-03-02 PROCEDURE — A9698 NON-RAD CONTRAST MATERIALNOC: HCPCS | Mod: Q1 | Performed by: INTERNAL MEDICINE

## 2023-03-02 PROCEDURE — 71260 CT THORAX DX C+: CPT | Mod: 26,Q1,, | Performed by: RADIOLOGY

## 2023-03-02 PROCEDURE — 74177 CT CHEST ABDOMEN PELVIS WITH CONTRAST (XPD): ICD-10-PCS | Mod: 26,Q1,, | Performed by: RADIOLOGY

## 2023-03-02 RX ADMIN — IOHEXOL 100 ML: 350 INJECTION, SOLUTION INTRAVENOUS at 11:03

## 2023-03-02 RX ADMIN — Medication 450 ML: at 11:03

## 2023-03-02 NOTE — TELEPHONE ENCOUNTER
Placed call to pt and informed pt his sx is luis manuel for 3/14/23 and sx time is 7 am and sx time subject to changes.     Informed pt per Dr Nick pt can hold his Xarelto 3 days before his sx date.    Informed pt to be at the surgery center at the hospital  on  [address] [facility] 2nd floor 2 hours prior to his surgery time     Surgery instruction provided:   Do not to eat or drink after midnight. No chewing gum or sucking candy   PAT Nurse will give pt a call to go thru pt medications list the day before sx date  Take a shower the night before and the morning of the surgery day with antibacterial soap and not to apply powder, deodorant and body lotion after shower  Will need someone to take him to the hospital and drive him home after the surgery. Pt states his wife will be with him  Leave all j valuable belongings at home.  Remove all body piercing, denture and contact lens  Stop drinking alcohol 1 weeks before surgery   Stop vitamins 1 week before sx   Patient can bring their cell phone.  Wear button front clothing/lose clothing  Pack a small bag with pt toiletries, bathrobe and house slipper   Post operative instruction given.  Informed pt to keep dressing/wound clean and dry.  Informed pt to avoid any activities that require pulling, pushing, raising arm overhead, and heavy lifting. Avoid strenuous and vigorous activities.   Pt questions and concerns were addressed. Informed pt if he has further questions and concerns to call our office. Telephone number provided     Pt verbalized understanding all of the above

## 2023-03-03 ENCOUNTER — OFFICE VISIT (OUTPATIENT)
Dept: HEMATOLOGY/ONCOLOGY | Facility: CLINIC | Age: 68
End: 2023-03-03
Payer: MEDICARE

## 2023-03-03 ENCOUNTER — RESEARCH ENCOUNTER (OUTPATIENT)
Dept: RESEARCH | Facility: HOSPITAL | Age: 68
End: 2023-03-03
Payer: MEDICARE

## 2023-03-03 VITALS
SYSTOLIC BLOOD PRESSURE: 128 MMHG | HEART RATE: 78 BPM | BODY MASS INDEX: 33.11 KG/M2 | HEIGHT: 73 IN | WEIGHT: 249.81 LBS | DIASTOLIC BLOOD PRESSURE: 58 MMHG | TEMPERATURE: 99 F | OXYGEN SATURATION: 96 % | RESPIRATION RATE: 18 BRPM

## 2023-03-03 DIAGNOSIS — E11.22 CKD STAGE 3 DUE TO TYPE 2 DIABETES MELLITUS: ICD-10-CM

## 2023-03-03 DIAGNOSIS — I25.10 CORONARY ARTERY DISEASE INVOLVING NATIVE CORONARY ARTERY OF NATIVE HEART WITHOUT ANGINA PECTORIS: ICD-10-CM

## 2023-03-03 DIAGNOSIS — E11.69 HYPERLIPIDEMIA ASSOCIATED WITH TYPE 2 DIABETES MELLITUS: ICD-10-CM

## 2023-03-03 DIAGNOSIS — E11.59 HYPERTENSION ASSOCIATED WITH DIABETES: ICD-10-CM

## 2023-03-03 DIAGNOSIS — N18.30 CKD STAGE 3 DUE TO TYPE 2 DIABETES MELLITUS: ICD-10-CM

## 2023-03-03 DIAGNOSIS — D69.6 THROMBOCYTOPENIA: ICD-10-CM

## 2023-03-03 DIAGNOSIS — I48.0 PAROXYSMAL ATRIAL FIBRILLATION: ICD-10-CM

## 2023-03-03 DIAGNOSIS — E78.5 HYPERLIPIDEMIA ASSOCIATED WITH TYPE 2 DIABETES MELLITUS: ICD-10-CM

## 2023-03-03 DIAGNOSIS — E11.22 TYPE 2 DIABETES MELLITUS WITH STAGE 3 CHRONIC KIDNEY DISEASE, WITHOUT LONG-TERM CURRENT USE OF INSULIN, UNSPECIFIED WHETHER STAGE 3A OR 3B CKD: ICD-10-CM

## 2023-03-03 DIAGNOSIS — I15.2 HYPERTENSION ASSOCIATED WITH DIABETES: ICD-10-CM

## 2023-03-03 DIAGNOSIS — R13.10 DYSPHAGIA, UNSPECIFIED TYPE: ICD-10-CM

## 2023-03-03 DIAGNOSIS — I50.20 HFREF (HEART FAILURE WITH REDUCED EJECTION FRACTION): ICD-10-CM

## 2023-03-03 DIAGNOSIS — C15.9 ESOPHAGEAL ADENOCARCINOMA: Primary | ICD-10-CM

## 2023-03-03 DIAGNOSIS — N18.30 TYPE 2 DIABETES MELLITUS WITH STAGE 3 CHRONIC KIDNEY DISEASE, WITHOUT LONG-TERM CURRENT USE OF INSULIN, UNSPECIFIED WHETHER STAGE 3A OR 3B CKD: ICD-10-CM

## 2023-03-03 LAB — BACTERIA SPEC ANAEROBE CULT: ABNORMAL

## 2023-03-03 PROCEDURE — 99214 OFFICE O/P EST MOD 30 MIN: CPT | Mod: Q1,S$PBB,, | Performed by: INTERNAL MEDICINE

## 2023-03-03 PROCEDURE — 99214 OFFICE O/P EST MOD 30 MIN: CPT | Mod: Q1,PBBFAC | Performed by: INTERNAL MEDICINE

## 2023-03-03 PROCEDURE — 99999 PR PBB SHADOW E&M-EST. PATIENT-LVL IV: CPT | Mod: PBBFAC,,, | Performed by: INTERNAL MEDICINE

## 2023-03-03 PROCEDURE — 99214 PR OFFICE/OUTPT VISIT, EST, LEVL IV, 30-39 MIN: ICD-10-PCS | Mod: Q1,S$PBB,, | Performed by: INTERNAL MEDICINE

## 2023-03-03 PROCEDURE — 99999 PR PBB SHADOW E&M-EST. PATIENT-LVL IV: ICD-10-PCS | Mod: PBBFAC,,, | Performed by: INTERNAL MEDICINE

## 2023-03-03 NOTE — PROGRESS NOTES
MEDICAL ONCOLOGY - ESTABLISHED PATIENT VISIT    Reason for visit: esophageal cancer    Best Contact Phone Number(s): There are no phone numbers on file.     Cancer/Stage/TNM:    Cancer Staging   Esophageal adenocarcinoma  Staging form: Esophagus - Adenocarcinoma, AJCC 8th Edition  - Clinical stage from 11/17/2022: Stage III (cT3, cN0, cM0) - Signed by Javier Moulton MD on 12/14/2022       Oncology History   Esophageal adenocarcinoma   11/17/2022 Cancer Staged    Staging form: Esophagus - Adenocarcinoma, AJCC 8th Edition  - Clinical stage from 11/17/2022: Stage III (cT3, cN0, cM0)       12/8/2022 Initial Diagnosis    Esophageal adenocarcinoma     12/21/2022 - 12/21/2022 Chemotherapy    Treatment Summary   Plan Name: OP ESOPHAGEAL PACLITAXEL CARBOPLATIN WEEKLY  Treatment Goal: Curative  Status: Inactive  Start Date:   End Date:   Provider: Miki Medrano MD  Chemotherapy: CARBOplatin (PARAPLATIN) in sodium chloride 0.9% 250 mL chemo infusion, , Intravenous, Clinic/HOD 1 time, 0 of 1 cycle  PACLitaxeL (TAXOL) 50 mg/m2 = 120 mg in sodium chloride 0.9% 250 mL chemo infusion, 50 mg/m2, Intravenous, Clinic/HOD 1 time, 0 of 1 cycle       12/29/2022 -  Chemotherapy    Treatment Summary   Plan Name: [facility] [id number] ARM B CARBOPLATIN PACLITAXEL NIVOLUMAB  Treatment Goal: Curative  Status: Active  Start Date: 12/29/2022  End Date: 1/27/2023 (Planned)  Provider: Miki Medrano MD  Chemotherapy: CARBOplatin (PARAPLATIN) 215 mg in sodium chloride 0.9% 306.5 mL chemo infusion, 215 mg, Intravenous, Clinic/HOD 1 time, 4 of 5 cycles  Administration: 215 mg (12/29/2022), 230 mg (1/5/2023), 265 mg (1/13/2023), 265 mg (1/20/2023)  PACLitaxeL (TAXOL) 50 mg/m2 = 120 mg in sodium chloride 0.9% 250 mL chemo infusion, 50 mg/m2 = 120 mg, Intravenous, Clinic/HOD 1 time, 4 of 5 cycles  Dose modification: 50 mg/m2 (original dose 50 mg/m2, Cycle 4)  Administration: 120 mg (12/29/2022), 120 mg (1/5/2023), 120 mg (1/13/2023), 120 mg  "(1/20/2023)       12/29/2022 - 2/1/2023 Radiation Therapy    Treating physician: Dr. Javier Moulton    Course: C1 CHEST 2022    Treatment Site Ref. ID Energy Dose/Fx (Gy) #Fx Dose Correction (Gy) Total Dose (Gy) Start Date End Date Elapsed Days   IM Esophagus [id number] 6X 1.8 23 / 23 0 41.4 12/29/2022 2/1/2023 34             Interim History:     Mr. Person returns to clinic today for follow-up.  He completed his fourth dose of weekly carbo/Taxol as part of his neoadjuvant chemoradiation on 1/20/23.  We did not proceed with dose five because of thrombocytopenia.  As his radiation was ending we did not reschedule his fifth dose.  He presents today feeling well overall.  He has had some very good days where he felt "excellent" but these have not been consecutive.  Occasionally will still have some dysphagia with certain foods but overall some mild improvement compared to where he was before treatment.  Has to eat slowly.  Denies any pain.    He had a gluteal abscess I&D'ed by Dr. Brown on Monday with growth of prevotella bivia.  Feels much better.  Completing a course of Bactrim.    Does have some low back pain that has recurred and that bothers him when he lies supine.      No other new complaints.  No dyspnea or diarrhea.      ROS:   Review of Systems   Constitutional:  Negative for appetite change, chills, fatigue, fever and unexpected weight change.   HENT:  Positive for trouble swallowing. Negative for mouth sores and voice change.    Eyes:  Negative for visual disturbance.   Respiratory:  Negative for cough, shortness of breath and wheezing.    Cardiovascular:  Negative for chest pain, palpitations and leg swelling.   Gastrointestinal:  Negative for abdominal pain, blood in stool, constipation, diarrhea, nausea, reflux and fecal incontinence.   Genitourinary:  Negative for bladder incontinence, dysuria, frequency, hematuria and urgency.   Musculoskeletal:  Positive for back pain. Negative for arthralgias, gait " problem, leg pain, myalgias and neck pain.   Integumentary:  Negative for rash and wound.   Neurological:  Negative for dizziness, facial asymmetry, weakness, light-headedness, headaches, coordination difficulties, memory loss and coordination difficulties.   Hematological:  Negative for adenopathy. Does not bruise/bleed easily.   Psychiatric/Behavioral:  Negative for agitation, behavioral problems, confusion and sleep disturbance. The patient is not nervous/anxious.        Past Medical History:   Past Medical History:   Diagnosis Date    Anticoagulant long-term use     Diabetes mellitus     Onset late 50s/early 60s    Hyperlipidemia     Hypertension     Onset late 50s/early 60s    Kidney stones     Sleep apnea     since 2006        Past Surgical History:   Past Surgical History:   Procedure Laterality Date    CORONARY ANGIOGRAPHY N/A 9/30/2020    Procedure: ANGIOGRAM, CORONARY ARTERY;  Surgeon: John West MD;  Location: [facility] CATH LAB;  Service: Cardiology;  Laterality: N/A;    CYSTOSCOPY N/A 2/17/2022    Procedure: CYSTOSCOPY;  Surgeon: Lukasz Hughes MD;  Location: [facility] OR [address] [place] [address];  Service: Urology;  Laterality: N/A;    CYSTOSCOPY W/ URETERAL STENT PLACEMENT N/A 2/25/2022    Procedure: CYSTOSCOPY, WITH URETERAL STENT INSERTION;  Surgeon: Lukasz Hughes MD;  Location: [facility] OR [address] [place] [address];  Service: Urology;  Laterality: N/A;    ENDOSCOPIC ULTRASOUND OF UPPER GASTROINTESTINAL TRACT N/A 12/7/2022    Procedure: ULTRASOUND, UPPER GI TRACT, ENDOSCOPIC;  Surgeon: Darian Main MD;  Location: [facility];  Service: Endoscopy;  Laterality: N/A;  Approval to hold Xarelto rec'd from Dr. Nick (see t/e 12/5/22)-DS    ESOPHAGOGASTRODUODENOSCOPY N/A 11/17/2022    Procedure: EGD (ESOPHAGOGASTRODUODENOSCOPY);  Surgeon: Brody Gonzales MD;  Location: [facility] ([facility]);  Service: Endoscopy;  Laterality: N/A;  inst via email-ok to hold Xarelto x 2 days-MS    LASER LITHOTRIPSY Left 2/25/2022    Procedure:  LITHOTRIPSY, USING LASER;  Surgeon: Lukasz Hughes MD;  [facility];  Service: Urology;  Laterality: Left;    LEFT HEART CATHETERIZATION Left 9/30/2020    Procedure: Left heart cath;  Surgeon: John West MD;  Location: [facility] CATH LAB;  Service: Cardiology;  Laterality: Left;    LITHOTRIPSY      PARATHYROIDECTOMY  1/1/2-107    PYELOSCOPY Left 2/25/2022    Procedure: PYELOSCOPY;  Surgeon: Lukasz Hughes MD;  [facility];  Service: Urology;  Laterality: Left;    TRANSESOPHAGEAL ECHOCARDIOGRAPHY N/A 4/7/2022    Procedure: ECHOCARDIOGRAM, TRANSESOPHAGEAL;  Surgeon: [facility] Diagnostic Provider;  Location: [facility] EP LAB;  Service: Cardiology;  Laterality: N/A;    TREATMENT OF CARDIAC ARRHYTHMIA N/A 4/7/2022    Procedure: Cardioversion or Defibrillation;  Surgeon: Iris Fisher NP;  Location: [facility] EP LAB;  Service: Cardiology;  Laterality: N/A;  afib, dccv, artie, anes, EH, 3prep    URETEROSCOPIC REMOVAL OF URETERIC CALCULUS Left 2/25/2022    Procedure: REMOVAL, CALCULUS, URETER, URETEROSCOPIC;  Surgeon: Lukasz Hughes MD;  Location: [facility] OR [address] [place] [address];  Service: Urology;  Laterality: Left;    URETEROSCOPY Left 2/25/2022    Procedure: URETEROSCOPY;  Surgeon: Lukasz Hughes MD;  Location: [address];  Service: Urology;  Laterality: Left;        Family History:   Family History   Problem Relation Age of Onset    Arthritis Mother     Heart disease Father 70        CABG    Arthritis Father     Diabetes Father     Kidney disease Father         had one kidney removed in early thirties    Arthritis Sister     Arthritis Sister     Hypertension Sister     Colon cancer Brother 32    Arthritis Brother     Alcohol abuse Paternal Aunt     Cancer Maternal Grandfather         throat cancer    Diabetes Paternal Grandmother     Colon polyps Paternal Grandfather     Colon cancer Paternal Grandfather     Cancer Paternal Grandfather         colon cancer at age 62        Social History:   Social  History     Tobacco Use    Smoking status: Former     Packs/day: 1.00     Years: 7.00     Pack years: 7.00     Types: Cigarettes, Cigars     Start date: 1972     Quit date:      Years since quittin.8     Passive exposure: Never    Smokeless tobacco: Never    Tobacco comments:     smoking was off and on.  cumulative 7 years.  never more than three years in one stretch or more lj   Substance Use Topics    Alcohol use: Yes     Alcohol/week: 3.0 standard drinks     Types: 2 Cans of beer, 1 Shots of liquor per week     Comment: don't drink regularly.  6 to 7 monthly      I have reviewed and updated the patient's past medical, surgical, family and social histories.    Allergies:   Review of patient's allergies indicates:  No Known Allergies     Medications:   Current Outpatient Medications   Medication Sig Dispense Refill    ACCU-CHEK SOFTCLIX LANCETS Misc USE EVERY  each 6    allopurinoL (ZYLOPRIM) 100 MG tablet TAKE 1 TABLET BY MOUTH EVERY DAY 30 tablet 10    blood sugar diagnostic (ACCU-CHEK ANTONELLA PLUS TEST STRP) Strp TEST EVERY MORNING 50 strip 11    blood-glucose meter kit Checks blood sugars 1x/daily. 1 each 12    citric acid-potassium citrate (POLYCITRA) 1,100-334 mg/5 mL solution Take 5 mLs (10 mEq total) by mouth 3 (three) times daily with meals. 450 mL 6    diphenhydrAMINE-aluminum-magnesium hydroxide-simethicone-LIDOcaine HCl 2% Swish and swallow 10 mLs every 6 (six) hours as needed (pain swallowing). 360 each 0    glimepiride (AMARYL) 2 MG tablet TAKE 2 TABLETS BY MOUTH EVERY MORNING 180 tablet 2    lisinopriL-hydrochlorothiazide (PRINZIDE,ZESTORETIC) 20-25 mg Tab TAKE 1 TABLET BY MOUTH EVERY DAY 90 tablet 3    magic mouthwash diphen/antac/lidoc Swish and swallow 10 ML's every 6 hours as needed (pain swallowing) 360 mL 0    metFORMIN 500 mg/5 mL Soln Take 1,000 mg by mouth 2 (two) times a day. 1800 mL 3    metoprolol succinate (TOPROL-XL) 25 MG 24 hr tablet Take 1 tablet (25 mg total) by  "mouth once daily. 30 tablet 11    nitroGLYCERIN (NITROSTAT) 0.4 MG SL tablet Place 1 tablet (0.4 mg total) under the tongue every 5 (five) minutes as needed for Chest pain. If you need a third tablet, call 911 60 tablet 12    omeprazole (PRILOSEC) 40 MG capsule Take 1 capsule (40 mg total) by mouth once daily. 90 capsule 3    rivaroxaban (XARELTO) 20 mg Tab Take 1 tablet (20 mg total) by mouth daily with dinner or evening meal. 30 tablet 11    rosuvastatin (CRESTOR) 20 MG tablet TAKE 1 TABLET(20 MG) BY MOUTH EVERY DAY 30 tablet 11    sulfamethoxazole-trimethoprim 400-80mg (BACTRIM,SEPTRA) 400-80 mg per tablet Take 1 tablet by mouth 2 (two) times daily. for 5 days 10 tablet 0    tamsulosin (FLOMAX) 0.4 mg Cap Take 1 capsule (0.4 mg total) by mouth once daily. 30 capsule 11    testosterone (ANDROGEL) 20.25 mg/1.25 gram (1.62 %) GlPm Apply 4 pumps to shoulders daily 2 each 5     No current facility-administered medications for this visit.        Physical Exam:   BP (!) 128/58 (BP Location: Right arm, Patient Position: Sitting, BP Method: Medium (Automatic))   Pulse 78   Temp 98.7 °F (37.1 °C) (Oral)   Resp 18   Ht 6' 1" (1.854 m)   Wt 113.3 kg (249 lb 12.5 oz)   SpO2 96%   BMI 32.95 kg/m²      ECOG Performance status: 0    Physical Exam  Vitals reviewed.   Constitutional:       General: He is not in acute distress.     Appearance: Normal appearance. He is not ill-appearing, toxic-appearing or diaphoretic.   HENT:      Head: Normocephalic and atraumatic.   Eyes:      General: No scleral icterus.     Extraocular Movements: Extraocular movements intact.      Conjunctiva/sclera: Conjunctivae normal.      Pupils: Pupils are equal, round, and reactive to light.   Cardiovascular:      Rate and Rhythm: Normal rate and regular rhythm.      Heart sounds: Normal heart sounds. No murmur heard.  Pulmonary:      Effort: Pulmonary effort is normal. No respiratory distress.      Breath sounds: Normal breath sounds. No wheezing " or rales.   Abdominal:      General: Bowel sounds are normal. There is no distension.      Palpations: Abdomen is soft.      Tenderness: There is no abdominal tenderness.   Musculoskeletal:         General: No swelling.      Cervical back: Normal range of motion.   Lymphadenopathy:      Cervical: No cervical adenopathy.   Skin:     Coloration: Skin is not jaundiced.      Findings: No bruising, erythema or rash.   Neurological:      General: No focal deficit present.      Mental Status: He is alert and oriented to person, place, and time. Mental status is at baseline.      Cranial Nerves: No cranial nerve deficit.      Motor: No weakness.      Gait: Gait normal.   Psychiatric:         Mood and Affect: Mood normal.         Behavior: Behavior normal.         Thought Content: Thought content normal.         Judgment: Judgment normal.       Labs:   No results found for this or any previous visit (from the past 48 hour(s)).         I have reviewed the pertinent labs from 3/1/23 which are notable for mild anemia.  Cr 1.4, normal LFTs.    Imagin2022 - EUS  Impression:             - Esophageal mucosal changes consistent with Paredes's esophagus.   - Partially obstructing, malignant esophageal tumor was found in the lower third of the esophagus.   - Normal stomach.   - Normal examined duodenum.   - A mass was found in the lower third of the esophagus. A tissue diagnosis was obtained prior to this exam. This is of adenocarcinoma. This was staged T3 (based on invasion into) N0 Mx by endosonographic criteria.   - No specimens collected.     3/1/22 - PET CT   Impression:     1.  Decreased uptake in persistent distal esophageal thickening suggestive of partial response to therapy.  Previous non hypermetabolic gastrohepatic and perigastric lymph nodes are stable to slightly decreased in size.     2.  No new FDG avid lesions to suggest mixed response to therapy.     3.  Incidental new hypermetabolic gluteal cutaneous  thickening, likely benign.  Recommend correlation with history and physical examination.    Path:     Final Pathologic Diagnosis  Abnormal   Gastroesophageal junction, biopsy:   - Adenocarcinoma.   - Background intestinal metaplasia with low and high-grade dysplasia.   - Focally suspicious for lymphovascular invasion.   - MMR pending, results will be issued in addendum.   - See comment.   COMMENT:  The biopsy shows adenocarcinoma with single cell infiltration of   the lamina propria highlighted on cytokeratin stain arising in a background   of intestinal metaplasia with low and high-grade dysplasia.  There is an area   suspicious for lymphovascular invasion on routine H&E sections however this   area is not confirmed on levels or immunostain for CD 34.            Assessment:       1. Esophageal adenocarcinoma    2. Dysphagia, unspecified type    3. Thrombocytopenia    4. Type 2 diabetes mellitus with stage 3 chronic kidney disease, without long-term current use of insulin, unspecified whether stage 3a or 3b CKD    5. CKD stage 3 due to type 2 diabetes mellitus    6. Hypertension associated with diabetes    7. Hyperlipidemia associated with type 2 diabetes mellitus    8. Paroxysmal atrial fibrillation    9. Coronary artery disease involving native coronary artery of native heart without angina pectoris    10. HFrEF (heart failure with reduced ejection fraction)              Plan:     # Esophageal adenocarcinoma   Mr. Person is a pleasant 67 year old male who presents to our clinic for management of recently diagnosed esophageal cancer. He initially presented with dysphagia, and further workup confirmed a stage II adenocarcinoma, pMMR. Tumor staged T3N0Mx by endosonographic criteria, JEVON. CT CAP on 12/14/22 confirmed no evidence of metastatic disease.    Previously conversation regarding his diagnosis and treatment options.  Recommended perioperative chemo/radiation per the CROSS trial. Discussed chemoradiation for ~5  weeks with 5 doses of weekly carboplatin and taxol administered. Plan to obtain restaging scans 4-5 weeks after radiation completed.     He was a candidate for a cooperative group trial assessing perioperative immunotherapy treatment. He consented for this study - ECOG-ACRIN [id number]: A Phase II/III Study of Dilcia-operative Nivolumab and Ipilimumab in Patients with Locoregional Esophageal and Gastroesophageal Junction Adenocarcinoma    Previously met with Dr. Santana Brown who agreed he was a surgical candidate.  Previously met with Dr. Javier Moulton who will be treating him with radiation.    Began cycle 1 carboplatin/paclitaxel/nivolumab on trial on 12/29/22.  Received cycle 4 of weekly chemotherapy on trial on 1/20/23.   We held chemotherapy for week 5 because of thrombocytopenia per protocol.    Completed radiation on 2/1/23.    Restaging PET/CT personally reviewed on 3/1/23 shows interval decreased FDG avidity in esophageal tumor, no new sites of disease.  CT CAP 3/2/23 shows stable esophageal thickening.  Clinically doing very well.    Scheduled for surgery 3/14/23 with Dr. Brown.  I will see him back 2-3 weeks after surgery to discuss further immunotherapy on study and review pathology.    # HTN, HLD, DM, CKD  Following with PCP Dr. Wilson and nephrologist Dr. Oconnell.   BP normal today.     Creatinine 1.4 on most recent labs.   Continue medical management.      # CAD, A fib, CHF  Following with cardiologist Dr. Nick.   Currently asymptomatic.   On Xarelto.   Continue medical management.     Follow up: one month     Miki Medrano MD  Hematology/Oncology  [facility]        Route Chart for Scheduling    Med Onc Chart Routing      Follow up with physician . Per hans Mayo   Follow up with SAMUEL    Infusion scheduling note    Injection scheduling note    Labs    Imaging    Pharmacy appointment    Other referrals        Treatment Plan Information   [facility] [id number] ARM B  CARBOPLATIN PACLITAXEL NIVOLUMAB   Miki Medrano MD   Upcoming Treatment Dates - [facility] [id number] ARM B CARBOPLATIN PACLITAXEL NIVOLUMAB    1/27/2023       Pre-Medications       palonosetron (ALOXI) 0.25 mg with Dexamethasone (DECADRON) 12 mg in NS 50 mL IVPB       famotidine (PF) injection 20 mg       diphenhydrAMINE (BENADRYL) 50 mg in sodium chloride 0.9% 50 mL IVPB       Chemotherapy       CARBOplatin (PARAPLATIN) 215 mg in sodium chloride 0.9% 271.5 mL chemo infusion       PACLitaxeL (TAXOL) 50 mg/m2 = 120 mg in sodium chloride 0.9% 250 mL chemo infusion    Therapy Plan Information  Flushes  heparin, porcine (PF) 100 unit/mL injection flush 500 Units  500 Units, Intravenous, Every visit  sodium chloride 0.9% flush 10 mL  10 mL, Intravenous, Every visit

## 2023-03-03 NOTE — PROGRESS NOTES
: URIEL Manriquez MD  Treating Investigators: NIRAV Medrano MD  Surgical Oncologist: SARAH Brown M.D. 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 Patient queried & verbalized his consent to continue on above-mentioned study. Patient queried & continues to report mild fatigue, improved dysphagia -- reporting that regurgitation does not happen often for the last 2 weeks, and mostly normal bowel movements. All other reported AEs have resolved. Patient had a left perianal abscess drained on 27Feb2023 by Dr. Brown & was prescribed 5 days of Bactrim. Patient queried & denies any other changes to his health or ConMeds.       Patient completed his 30-day post-RT / Pre-Surgery labs and his re-staging PET/CT on 01Mar2023 & his re-staging CT C/A/P on 02Mar2023. Patient's VS, PE & ECOG (ECOG = 0, per Dr. Medrano) were completed today. Patient also met with Dr. Brown on 27Feb2023 & confirmed patient is eligible for surgical resection.   Dr. Medrano assessed all patient's labs, VS & PE findings as either WNL or NCS. He also reviewed the patient's scans & agrees with surgeon Dr. Brown that patient appears eligible for surgery. Dr. Medrano would like to see the patient back 3 weeks post-surgery to discuss the pathology report & Step 2 of the clinical trial that is outlined below.  Patient was encouraged to contact [facility] or Neva Medrano or Stephanie's team with any questions or concerns & was reminded of clinic's 24/7 emergency contact number. Patient  verbalized understanding & denies any additional questions at this time.         SOC 3 Week Post-Op:  [id number] @ 1010 - labs @ [facility]  [id number] @ [facility] 2nd Floor           Solicited AEs:    *Anemia - Grade 0 -- 19Jan2023  Platelet count decreased - Grade 1 -- NCS per MD -- resolved 01Mar2023  *White blood cell count decreased - Grade 2 -- 19Jan2023 -- NCS per MD -- resolved 01Mar2023  Neutrophil count decreased - Grade 0  *Lymphocyte count decreased - Grade 3 -- 19Jan2023 -- NCS per MD (R/T Nivo)  Peripheral motor neuropathy - Grade 0   Peripheral sensory neuropathy - Grade 0   Creatinine increased - Grade 0  Hypothyroidism - Grade 0 -- assessed 01Mar2023 (WNL)  Diarrhea (patient baseline BM = 2 stools a day) - Grade 0 -- reports loose stools vs. diarrhea   Fatigue - Grade 1 -- unchanged from prior  Nausea - Grade 1 -- unchanged from prior -- resolved 15Feb2023  *Cough - Grade 0 -- newly reported symptom since starting lisinopril/HCTZ several years ago  Pneumonitis - Grade 0  Hyperglycemia - Grade 1 -- Patient has a history of diabetes. No changes in regimen indicated.   Adrenal Insufficiency - Grade 0  *Rash-maculo-papular - Grade 1 - Buttocks -- assessed 20Jan2023, started using CeraVe yesterday  *Pruritus - Grade 1 -- Buttocks -- started using CeraVe with relief  Erythema multiforme - Grade 0  Vomiting - Grade 0  Lipase increased -- Not required per study calendar and therefore not assessed this visit.      New & Ongoing Non-Solicited AEs:  Insomnia, int. - Grade 1 - 06Jan2023 - ongoing (R/T steroids given during infusion)  -- resolved 25Jan2023  Anorexia - Grade 1 - 09Jan2023 - ongoing (no treatment)  --  resolved 15Feb2023        Complete Baseline Medical History, Adverse Events, and Concomitant Medications are located in patient's shadow chart

## 2023-03-06 ENCOUNTER — OFFICE VISIT (OUTPATIENT)
Dept: PSYCHIATRY | Facility: CLINIC | Age: 68
End: 2023-03-06
Payer: MEDICARE

## 2023-03-06 DIAGNOSIS — F43.29 STRESS AND ADJUSTMENT REACTION: ICD-10-CM

## 2023-03-06 DIAGNOSIS — C15.9 ESOPHAGEAL ADENOCARCINOMA: Primary | ICD-10-CM

## 2023-03-06 PROCEDURE — 99499 NO LOS: ICD-10-PCS | Mod: S$PBB,,, | Performed by: PSYCHOLOGIST

## 2023-03-06 PROCEDURE — 99499 UNLISTED E&M SERVICE: CPT | Mod: S$PBB,,, | Performed by: PSYCHOLOGIST

## 2023-03-07 ENCOUNTER — OFFICE VISIT (OUTPATIENT)
Dept: SURGERY | Facility: CLINIC | Age: 68
End: 2023-03-07
Payer: MEDICARE

## 2023-03-07 VITALS
OXYGEN SATURATION: 96 % | HEART RATE: 70 BPM | SYSTOLIC BLOOD PRESSURE: 120 MMHG | WEIGHT: 246.81 LBS | DIASTOLIC BLOOD PRESSURE: 62 MMHG | HEIGHT: 73 IN | BODY MASS INDEX: 32.71 KG/M2 | TEMPERATURE: 99 F

## 2023-03-07 DIAGNOSIS — C15.9 ESOPHAGEAL ADENOCARCINOMA: Primary | ICD-10-CM

## 2023-03-07 DIAGNOSIS — L02.31 ABSCESS OF RIGHT BUTTOCK: ICD-10-CM

## 2023-03-07 PROCEDURE — 99999 PR PBB SHADOW E&M-EST. PATIENT-LVL IV: ICD-10-PCS | Mod: PBBFAC,,,

## 2023-03-07 PROCEDURE — 99024 PR POST-OP FOLLOW-UP VISIT: ICD-10-PCS | Mod: S$PBB,,,

## 2023-03-07 PROCEDURE — 99214 OFFICE O/P EST MOD 30 MIN: CPT | Mod: PBBFAC

## 2023-03-07 PROCEDURE — 99999 PR PBB SHADOW E&M-EST. PATIENT-LVL IV: CPT | Mod: PBBFAC,,,

## 2023-03-07 PROCEDURE — 99024 POSTOP FOLLOW-UP VISIT: CPT | Mod: S$PBB,,,

## 2023-03-07 NOTE — PROGRESS NOTES
"  Post-Op Follow-up Visit:   3/7/2023  Patient ID: Lukasz Person is a 67 y.o. male, born 1955    Chief Complaint   Patient presents with    Wound Check     Interval History: Mr. Person returns to the clinic for a wound check after I&D of perirectal abscess on 2/27/23. He is scheduled for robotic esophagectomy with Dr. Brown on 3/14/23. He is doing well since the procedure. Reports no drainage, redness, irritation, swelling, pain. Remains afebrile.     Physical Exam:  /62   Pulse 70   Temp 98.5 °F (36.9 °C)   Ht 6' 1" (1.854 m)   Wt 111.9 kg (246 lb 12.8 oz)   SpO2 96%   BMI 32.56 kg/m²     General:  Non-toxic, ambulatory  Abd:  Soft, non-tender  Incision:  I &D, CDI, site healing well      ICD-10-CM ICD-9-CM    1. Esophageal adenocarcinoma  C15.9 150.9       2. Abscess of right buttock  L02.31 682.5         Plan   Mr. Person returns to the clinic for a wound check after I&D of perirectal abscess on 2/27/23. He is scheduled for robotic esophagectomy with Dr. Brown on 3/14/23. He is doing well since the procedure. The site is healing well and is barely visible.   Questions were asked and answered to patient's satisfaction.  We discussed the need for continued clinical/radiographic/endoscopic follow-up.     Follow up for post operative/hospital discharge.         ASHU Ferguson, FNP-C  Upper GI / Hepatobiliary Surgical Oncology  [facility] [place], [place]  Office: [phone]  Fax: [phone]       "

## 2023-03-09 NOTE — PROGRESS NOTES
INFORMED CONSENT: This case is under the supervision of a licensed clinical psychologist. Patient made aware that services are provided by a psychology intern, with suervision by a licensed clinical psychologist who is a cosigner on this note.Lukasz Naya   identify confirmed with two identifiers.  The patient has been informed of the risks and benefits associated with engaging in psychotherapy, the handling of protected health information, the rights of privacy and the limits of confidentiality. The patient has also been informed of the importance of reporting any suicidal or homicidal ideation to this or any provider to ensure safety of all parties, and the Lukasz Person expressed understanding. The patient was agreeable to these terms and freely participates in individual psychotherapy.    PSYCHO-ONCOLOGY NOTE/ Individual Psychotherapy     Date: 3/6/2023   Site:  [facility]        Therapeutic Intervention: Met with patient.    Outpatient - Behavior modifying psychotherapy 45 min - CPT code 57772  Brief emotional/behavioral assessment measures- CPT 99349    Patient was last seen by me on Visit date not found    Problem list  Patient Active Problem List   Diagnosis    Type 2 diabetes mellitus with kidney complication, without long-term current use of insulin    KUMAR on CPAP    Gout    Hyperparathyroidism, primary    Hypertension associated with diabetes    Hyperlipidemia associated with type 2 diabetes mellitus    Severe obesity (BMI 35.0-35.9 with comorbidity)    Obesity, diabetes, and hypertension syndrome    DM type 2 without retinopathy    Diabetes mellitus with cataract    Male hypogonadism    Coronary artery disease involving native coronary artery of native heart    Low serum alkaline phosphatase    BPH with urinary obstruction    Erectile dysfunction due to arterial insufficiency    Chronic left shoulder pain    Nephrolithiasis    Paroxysmal atrial fibrillation    Chronic anticoagulation    HFrEF (heart  failure with reduced ejection fraction)    Long term current use of amiodarone    Non-ischemic cardiomyopathy    CKD stage 3 due to type 2 diabetes mellitus    Esophageal adenocarcinoma    Aortic atherosclerosis       Chief complaint/reason for encounter: anxiety   Met with patient to evaluate psychosocial adaptation to diagnosis/treatment/survivorship of esophageal adenocarcinoma     Current Medications  Current Outpatient Medications   Medication    ACCU-CHEK SOFTCLIX LANCETS Misc    allopurinoL (ZYLOPRIM) 100 MG tablet    blood sugar diagnostic (ACCU-CHEK ANTONELLA PLUS TEST STRP) Strp    blood-glucose meter kit    citric acid-potassium citrate (POLYCITRA) 1,100-334 mg/5 mL solution    diphenhydrAMINE-aluminum-magnesium hydroxide-simethicone-LIDOcaine HCl 2%    glimepiride (AMARYL) 2 MG tablet    lisinopriL-hydrochlorothiazide (PRINZIDE,ZESTORETIC) 20-25 mg Tab    magic mouthwash diphen/antac/lidoc    metFORMIN 500 mg/5 mL Soln    metoprolol succinate (TOPROL-XL) 25 MG 24 hr tablet    nitroGLYCERIN (NITROSTAT) 0.4 MG SL tablet    omeprazole (PRILOSEC) 40 MG capsule    rivaroxaban (XARELTO) 20 mg Tab    rosuvastatin (CRESTOR) 20 MG tablet    tamsulosin (FLOMAX) 0.4 mg Cap    testosterone (ANDROGEL) 20.25 mg/1.25 gram (1.62 %) GlPm     No current facility-administered medications for this visit.       Objective:  Lukasz Person arrived  promptly for the session.  Mr. Person was independently ambulatory at the time of session. The patient was fully cooperative throughout the session.  Appearance: age appropriate, professionally  dressed, adequately  groomed  Behavior/Cooperation: friendly and cooperative  Speech: normal in rate, volume, and tone  Mood: euthymic  Affect: mood congruent  Thought Process: goal-directed, logical  Thought Content: normal,  No delusions or paranoia; did not appear to be responding to internal stimuli during the session  Orientation: grossly intact  Memory: Grossly intact  Attention  Span/Concentration: Attends to session without distraction; reports no difficulty  Fund of Knowledge: average  Estimate of Intelligence: average from verbal skills and history  Cognition: grossly intact  Insight: patient has awareness of illness; good insight into own behavior and behavior of others  Judgment: the patient's behavior is adequate to circumstances    Interval history and content of current session: Patient discussed events and activities since the time of last visit. Discussed diagnosis, treatment, prognosis, and current adaptation to disease and treatment status. Reports to be coping in an adequate manner. Evaluated cognitive response, paying particular attention to negative intrusive thoughts of a persistent and detrimental nature. Thoughts of this type are in evidence with mild distress. Provided cognitive behavioral therapy to address negative cognitions.    Identified and evaluated psychosocial and environmental stressors secondary to diagnosis and treatment (none). Patient expressed mild anticipatory anxiety for upcoming surgery however, reports to be coping well overall.  Examined proactive behaviors that may be implemented to minimize or ameliorate psychosocial stressors.      Risk parameters:   Patient reports no suicidal ideation  Patient reports no homicidal ideation  Patient reports no self-injurious behavior  Patient reports no violent behavior   Safety needs:  None at this time      Verbal deficits: None     Patient's response to intervention:The patient's response to intervention is accepting.     Progress toward goals: Progress as Expected        Patient reported outcomes:      Distress thermometer:   DISTRESS SCREENING 3/7/2023 3/3/2023 2/27/2023 2/15/2023 2/10/2023 1/30/2023 1/27/2023   Distress Score 0 - No Distress 0 - No Distress 1 3 - 0 - No Distress 0 - No Distress   Practical Problems None of these None of these None of these - None of these None of these None of these   Family  Problems None of these None of these None of these - None of these None of these None of these   Emotional Problems None of these None of these Depression - Fears None of these None of these   Spiritual / Synagogue Concerns No No No - No No No   Physical Problems None of these None of these None of these - Eating;Skin Dry/Itchy;Sleep None of these None of these   Other Problems - - - - Back pain (possible kidney) problem - -           PHQ-9= Initial visit:     PHQ ANSWERS    Q1.    Q2.    Q3.    Q4.    Q5.    Q6.    Q7.    Q8.    Q9.             absent     LARS-7= Initial visit:   GAD7 2/10/2023   1. Feeling nervous, anxious, or on edge? 0   2. Not being able to stop or control worrying? 1   3. Worrying too much about different things? 0   4. Trouble relaxing? 0   5. Being so restless that it is hard to sit still? 0   6. Becoming easily annoyed or irritable? 1   7. Feeling afraid as if something awful might happen? 0   LARS-7 Score 2      absent      Client Strengths: verbal, intelligent, successful, good social support, good insight, commitment to wellness, strong david, strong cultural traditions       Treatment Plan:individual psychotherapy  Target symptoms: anxiety   Why chosen therapy is appropriate versus another modality: relevant to diagnosis, patient responds to this modality, evidence based practice  Outcome monitoring methods: self-report  Therapeutic intervention type: behavior modifying psychotherapy  Prognosis: Good    Goals from last visit: Met   Behavioral goals prior to next visit:    Exercise:   Stress management:   Social engagement:   Nutrition:   Smoking Cessation:   Therapy: will follow-up in-patient following surgery    Return to clinic: as needed     Length of Service (minutes direct face-to-face contact): 45    Diagnosis:     ICD-10-CM ICD-9-CM   1. Esophageal adenocarcinoma  C15.9 150.9   2. Stress and adjustment reaction  F43.29 309.89       Jadyn Logan MS  Psychology Intern    Disha  LAMBERT Harman, PhD  Clinical Psychologist  [place] License #[id number]  [place] License #[id number]

## 2023-03-10 ENCOUNTER — TELEPHONE (OUTPATIENT)
Dept: SURGERY | Facility: CLINIC | Age: 68
End: 2023-03-10
Payer: MEDICARE

## 2023-03-10 DIAGNOSIS — C15.9 ESOPHAGEAL ADENOCARCINOMA: Primary | ICD-10-CM

## 2023-03-10 RX ORDER — CELECOXIB 200 MG/1
400 CAPSULE ORAL
Status: CANCELLED | OUTPATIENT
Start: 2023-03-10

## 2023-03-10 RX ORDER — ENOXAPARIN SODIUM 100 MG/ML
40 INJECTION SUBCUTANEOUS
Status: CANCELLED | OUTPATIENT
Start: 2023-03-10

## 2023-03-10 RX ORDER — ACETAMINOPHEN 500 MG
1000 TABLET ORAL
Status: CANCELLED | OUTPATIENT
Start: 2023-03-10

## 2023-03-10 RX ORDER — METRONIDAZOLE 500 MG/100ML
500 INJECTION, SOLUTION INTRAVENOUS
Status: CANCELLED | OUTPATIENT
Start: 2023-03-10

## 2023-03-10 RX ORDER — CEFAZOLIN SODIUM 2 G/50ML
2 SOLUTION INTRAVENOUS
Status: CANCELLED | OUTPATIENT
Start: 2023-03-10

## 2023-03-10 NOTE — PROGRESS NOTES
Patient Active Problem List   Diagnosis    Type 2 diabetes mellitus with kidney complication, without long-term current use of insulin    KUMAR on CPAP    Gout    Hyperparathyroidism, primary    Hypertension associated with diabetes    Hyperlipidemia associated with type 2 diabetes mellitus    Severe obesity (BMI 35.0-35.9 with comorbidity)    Obesity, diabetes, and hypertension syndrome    DM type 2 without retinopathy    Diabetes mellitus with cataract    Male hypogonadism    Coronary artery disease involving native coronary artery of native heart    Low serum alkaline phosphatase    BPH with urinary obstruction    Erectile dysfunction due to arterial insufficiency    Chronic left shoulder pain    Nephrolithiasis    Paroxysmal atrial fibrillation    Chronic anticoagulation    HFrEF (heart failure with reduced ejection fraction)    Long term current use of amiodarone    Non-ischemic cardiomyopathy    CKD stage 3 due to type 2 diabetes mellitus    Esophageal adenocarcinoma    Aortic atherosclerosis

## 2023-03-10 NOTE — TELEPHONE ENCOUNTER
Placed call to pt and informed pt his sx is confirmed for 3/14/23 & sx time 7 am. Informed pt he has to be at the hospital @ 5 am to check in. Facility address provided.   Informed pt per Dr Nick pt can hold xarelto 3 days before sx. Pt states he will hold his Xarelto on Saturday 3/11/23.    Pt states he understand the pre op instructions that was previously given to him. Informed pt to call our office if he has any questions or concerns. Telephone number provided

## 2023-03-11 ENCOUNTER — ANESTHESIA EVENT (OUTPATIENT)
Dept: SURGERY | Facility: HOSPITAL | Age: 68
DRG: 327 | End: 2023-03-11
Payer: MEDICARE

## 2023-03-13 ENCOUNTER — TELEPHONE (OUTPATIENT)
Dept: SURGERY | Facility: CLINIC | Age: 68
End: 2023-03-13
Payer: MEDICARE

## 2023-03-13 NOTE — PRE-PROCEDURE INSTRUCTIONS
PreOp Instructions given:   - Verbal medication information (what to hold and what to take)   - NPO guidelines [id number]  - Arrival place directions given; time to be given the day before procedure by the   Surgeon's Office 0500 DOSC  - Bathing with antibacterial soap   - Don't wear any jewelry or bring any valuables AM of surgery   - No makeup or moisturizer to face   - No perfume/cologne, powder, lotions or aftershave   Pt. verbalized understanding.   Pt denies any h/o Anesthesia/Sedation complications or side effects.  
9

## 2023-03-13 NOTE — TELEPHONE ENCOUNTER
Call placed to pt. Confirmed procedure for 3/14. Arrival time 0500 and surgery time 0700. Pt reports last dose of xarelto was Friday evening. Pt declined this RN to review preop instructions. He reported these were previously reviewed and he has no questions at this time. Instructed pt to reach out for any concerns. Pt verbalized understanding.

## 2023-03-13 NOTE — ANESTHESIA PREPROCEDURE EVALUATION
[facility]  Anesthesia Pre-Operative Evaluation         Patient Name: Lukasz Person  YOB: 1955  MRN: [id number]    SUBJECTIVE:     Pre-operative evaluation for Procedure(s) (LRB):  XI ROBOTIC ESOPHAGECTOMY (N/A)     03/13/2023    Lukasz Person is a 67 y.o. male w/ a significant PMHx of DM2, KUMAR (on CPAP), CKD3, CAD .    Patient now presents for the above procedure(s).      LDA: None documented.       PowerPort A Cath Single Lumen 12/22/22 0912 right internal jugular (Active)   Patency/Care flushed w/o difficulty;heparin locked;blood return present 02/27/23 1417   Status Accessed 02/27/23 1417   Accessed by: tim lynn 02/27/23 1417   Needle Insertion Date 02/27/23 02/27/23 1417   Needle Insertion Time 1417 02/27/23 1417   Type of Needle Agrawal 02/27/23 1417   Gauge 20 02/27/23 1417   Needle Length 1 in 02/27/23 1417   Needle Status Removed 02/27/23 1417   Flush Performed Yes 02/27/23 1417   Needle Removal Date 02/27/23 02/27/23 1417   Needle Removal Time 1417 02/27/23 1417   Deaccessed By tim lynn 02/27/23 1417   Number of days: 81       Prev airway: Performed by: Sudha Lyn CRNA  Authorized by: Beth Owens MD      Intubation:     Induction:  Intravenous    Intubated:  Postinduction    Mask Ventilation:  Easy with oral airway    Attempts:  1    Attempted By:  CRNA    Method of Intubation:  Direct    Blade:  Lucero 2    Laryngeal View Grade: Grade IIA - cords partially seen      Difficult Airway Encountered?: No      Complications:  None    Airway Device:  Oral endotracheal tube    Airway Device Size:  8.0    Style/Cuff Inflation:  Cuffed (inflated to minimal occlusive pressure)    Tube secured:  23    Secured at:  The teeth    Placement Verified By:  Capnometry    Complicating Factors:  Obesity    Findings Post-Intubation:  BS equal bilateral and atraumatic/condition of  teeth unchanged     Drips: None documented.      Patient Active Problem List   Diagnosis    Type 2 diabetes mellitus with kidney complication, without long-term current use of insulin    KUMAR on CPAP    Gout    Hyperparathyroidism, primary    Hypertension associated with diabetes    Hyperlipidemia associated with type 2 diabetes mellitus    Severe obesity (BMI 35.0-35.9 with comorbidity)    Obesity, diabetes, and hypertension syndrome    DM type 2 without retinopathy    Diabetes mellitus with cataract    Male hypogonadism    Coronary artery disease involving native coronary artery of native heart    Low serum alkaline phosphatase    BPH with urinary obstruction    Erectile dysfunction due to arterial insufficiency    Chronic left shoulder pain    Nephrolithiasis    Paroxysmal atrial fibrillation    Chronic anticoagulation    HFrEF (heart failure with reduced ejection fraction)    Long term current use of amiodarone    Non-ischemic cardiomyopathy    CKD stage 3 due to type 2 diabetes mellitus    Esophageal adenocarcinoma    Aortic atherosclerosis       Review of patient's allergies indicates:  No Known Allergies    Current Inpatient Medications:      No current facility-administered medications on file prior to encounter.     Current Outpatient Medications on File Prior to Encounter   Medication Sig Dispense Refill    ACCU-CHEK SOFTCLIX LANCETS Misc USE EVERY  each 6    allopurinoL (ZYLOPRIM) 100 MG tablet TAKE 1 TABLET BY MOUTH EVERY DAY 30 tablet 10    blood-glucose meter kit Checks blood sugars 1x/daily. 1 each 12    glimepiride (AMARYL) 2 MG tablet TAKE 2 TABLETS BY MOUTH EVERY MORNING 180 tablet 2    lisinopriL-hydrochlorothiazide (PRINZIDE,ZESTORETIC) 20-25 mg Tab TAKE 1 TABLET BY MOUTH EVERY DAY 90 tablet 3    metoprolol succinate (TOPROL-XL) 25 MG 24 hr tablet Take 1 tablet (25 mg total) by mouth once daily. 30 tablet 11    nitroGLYCERIN (NITROSTAT) 0.4 MG SL tablet Place  1 tablet (0.4 mg total) under the tongue every 5 (five) minutes as needed for Chest pain. If you need a third tablet, call 911 (Patient not taking: Reported on 3/7/2023) 60 tablet 12    omeprazole (PRILOSEC) 40 MG capsule Take 1 capsule (40 mg total) by mouth once daily. 90 capsule 3    rivaroxaban (XARELTO) 20 mg Tab Take 1 tablet (20 mg total) by mouth daily with dinner or evening meal. 30 tablet 11    rosuvastatin (CRESTOR) 20 MG tablet TAKE 1 TABLET(20 MG) BY MOUTH EVERY DAY 30 tablet 11    tamsulosin (FLOMAX) 0.4 mg Cap Take 1 capsule (0.4 mg total) by mouth once daily. 30 capsule 11    testosterone (ANDROGEL) 20.25 mg/1.25 gram (1.62 %) GlPm Apply 4 pumps to shoulders daily 2 each 5       Past Surgical History:   Procedure Laterality Date    CORONARY ANGIOGRAPHY N/A 9/30/2020    Procedure: ANGIOGRAM, CORONARY ARTERY;  Surgeon: John West MD;  Location: [facility] CATH LAB;  Service: Cardiology;  Laterality: N/A;    CYSTOSCOPY N/A 2/17/2022    Procedure: CYSTOSCOPY;  Surgeon: Lukasz Hughes MD;  Location: [facility] OR [address] [place] [address];  Service: Urology;  Laterality: N/A;    CYSTOSCOPY W/ URETERAL STENT PLACEMENT N/A 2/25/2022    Procedure: CYSTOSCOPY, WITH URETERAL STENT INSERTION;  Surgeon: Lukasz Hughes MD;  Location: [facility] OR [address] [place] [address];  Service: Urology;  Laterality: N/A;    ENDOSCOPIC ULTRASOUND OF UPPER GASTROINTESTINAL TRACT N/A 12/7/2022    Procedure: ULTRASOUND, UPPER GI TRACT, ENDOSCOPIC;  Surgeon: Darian Main MD;  Location: [facility] ENDO;  Service: Endoscopy;  Laterality: N/A;  Approval to hold Xarelto rec'd from Dr. Nick (see t/e 12/5/22)-DS    ESOPHAGOGASTRODUODENOSCOPY N/A 11/17/2022    Procedure: EGD (ESOPHAGOGASTRODUODENOSCOPY);  Surgeon: Brody Gonzales MD;  Location: [facility] (2ND FLR);  Service: Endoscopy;  Laterality: N/A;  inst via email-ok to hold Xarelto x 2 days-MS    LASER LITHOTRIPSY Left 2/25/2022    Procedure: LITHOTRIPSY, USING LASER;  Surgeon: Lukasz Hughes MD;   Location: [address];  Service: Urology;  Laterality: Left;    LEFT HEART CATHETERIZATION Left 2020    Procedure: Left heart cath;  Surgeon: John West MD;  Location: [facility] CATH LAB;  Service: Cardiology;  Laterality: Left;    LITHOTRIPSY      PARATHYROIDECTOMY  -107    PYELOSCOPY Left 2022    Procedure: PYELOSCOPY;  Surgeon: Lukasz Hughes MD;  Location: [address];  Service: Urology;  Laterality: Left;    TRANSESOPHAGEAL ECHOCARDIOGRAPHY N/A 2022    Procedure: ECHOCARDIOGRAM, TRANSESOPHAGEAL;  Surgeon: Xander Diagnostic Provider;  Location: [facility] EP LAB;  Service: Cardiology;  Laterality: N/A;    TREATMENT OF CARDIAC ARRHYTHMIA N/A 2022    Procedure: Cardioversion or Defibrillation;  Surgeon: Iris Fisher NP;  Location: [facility] EP LAB;  Service: Cardiology;  Laterality: N/A;  afib, dccv, artie, anes, EH, 3prep    URETEROSCOPIC REMOVAL OF URETERIC CALCULUS Left 2022    Procedure: REMOVAL, CALCULUS, URETER, URETEROSCOPIC;  Surgeon: Lukasz Hughes MD;  Location: [address];  Service: Urology;  Laterality: Left;    URETEROSCOPY Left 2022    Procedure: URETEROSCOPY;  Surgeon: Lukasz Hughes MD;  Location: [address];  Service: Urology;  Laterality: Left;       Social History     Socioeconomic History    Marital status:      Spouse name: Amanda   Tobacco Use    Smoking status: Former     Packs/day: 1.00     Years: 7.00     Pack years: 7.00     Types: Cigarettes, Cigars     Start date: 1972     Quit date:      Years since quittin.8     Passive exposure: Never    Smokeless tobacco: Never    Tobacco comments:     smoking was off and on.  cumulative 7 years.  never more than three years in one stretch or more lj   Substance and Sexual Activity    Alcohol use: Yes     Alcohol/week: 3.0 standard drinks     Types: 2 Cans of beer, 1 Shots of liquor per week     Comment: don't drink regularly.  6 to 7 monthly    Drug use: Not  Currently     Types: Marijuana    Sexual activity: Not Currently     Partners: Female     Birth control/protection: None     Comment:      Social Determinants of Health     Financial Resource Strain: Low Risk     Difficulty of Paying Living Expenses: Not hard at all   Food Insecurity: No Food Insecurity    Worried About Running Out of Food in the Last Year: Never true    Ran Out of Food in the Last Year: Never true   Transportation Needs: No Transportation Needs    Lack of Transportation (Medical): No    Lack of Transportation (Non-Medical): No   Physical Activity: Sufficiently Active    Days of Exercise per Week: 5 days    Minutes of Exercise per Session: 40 min   Stress: No Stress Concern Present    Feeling of Stress : Only a little   Social Connections: Unknown    Frequency of Communication with Friends and Family: Once a week    Frequency of Social Gatherings with Friends and Family: Once a week    Active Member of Clubs or Organizations: Yes    Attends Club or Organization Meetings: More than 4 times per year    Marital Status:    Housing Stability: Low Risk     Unable to Pay for Housing in the Last Year: No    Number of Places Lived in the Last Year: 1    Unstable Housing in the Last Year: No       OBJECTIVE:     Vital Signs Range (Last 24H):         Significant Labs:  Lab Results   Component Value Date    WBC 3.92 03/01/2023    HGB 12.1 (L) 03/01/2023    HCT 37.7 (L) 03/01/2023     03/01/2023    CHOL 107 (L) 11/07/2022    TRIG 218 (H) 11/07/2022    HDL 34 (L) 11/07/2022    ALT 22 03/01/2023    AST 26 03/01/2023     03/01/2023    K 5.3 (H) 03/01/2023     03/01/2023    CREATININE 1.4 03/01/2023    BUN 20 03/01/2023    CO2 27 03/01/2023    TSH 1.634 03/01/2023    PSA 1.2 10/06/2021    INR 1.1 04/01/2022    HGBA1C 6.9 (H) 11/07/2022       Diagnostic Studies: No relevant studies.    EKG:   Results for orders placed or performed during the hospital encounter of  "04/07/22   EKG 12-LEAD    Collection Time: 04/07/22  2:19 PM    Narrative    Test Reason : I48.91,    Vent. Rate : 075 BPM     Atrial Rate : 075 BPM     P-R Int : 158 ms          QRS Dur : 086 ms      QT Int : 408 ms       P-R-T Axes : 043 -19 048 degrees     QTc Int : 455 ms    Normal sinus rhythm  Low voltage QRS in the precordial leads  Inferior infarct  Abnormal ECG  When compared with ECG of 07-APR-2022 11:56,  Sinus rhythm has replaced Atrial fibrillation  Confirmed by Azucena Del Cid MD ([id number]) on 4/7/2022 4:25:34 PM    Referred By: CORINA MONTALVO           Confirmed By:Azucena Del Cid MD       2D ECHO:  TTE:  Results for orders placed or performed during the hospital encounter of 05/26/22   Echo Saline Bubble? No   Result Value Ref Range    Ascending aorta 3.17 cm    STJ 2.52 cm    AV mean gradient 10 mmHg    Ao peak gilmer 2.07 m/s    Ao VTI 50.73 cm    IVRT 87.54 msec    IVS 0.92 0.6 - 1.1 cm    LA size 4.89 cm    Left Atrium Major Axis 5.29 cm    Left Atrium Minor Axis 5.44 cm    LVIDd 5.24 3.5 - 6.0 cm    LVIDs 3.38 2.1 - 4.0 cm    LVOT diameter 2.14 cm    LVOT peak VTI 23.81 cm    Posterior Wall 0.94 0.6 - 1.1 cm    MV Peak A Gilmer 0.49 m/s    E wave deceleration time 259.07 msec    MV Peak E Gilmer 0.85 m/s    PV Peak D Gilmer 0.74 m/s    PV Peak S Gilmer 0.74 m/s    RA Major Axis 5.44 cm    RA Width 3.31 cm    RVDD 2.87 cm    Sinus 2.84 cm    TAPSE 2.63 cm    TR Max Gilmer 2.65 m/s    TDI LATERAL 0.11 m/s    TDI SEPTAL 0.09 m/s    LA WIDTH 4.34 cm    PV PEAK VELOCITY 1.16 cm/s    MV stenosis pressure 1/2 time 49.23 ms    LV Diastolic Volume 131.81 mL    LV Systolic Volume 46.84 mL    LVOT peak gilmer 0.98 m/s    RVOT peak VTI 20.77 cm    RVOT peak gilmer 0.79 m/s    LA volume (mod) 54.25 cm3    MV "A" wave duration 15.60 msec    PV mean gradient 1.50 mmHg    LV LATERAL E/E' RATIO 7.73 m/s    LV SEPTAL E/E' RATIO 9.44 m/s    FS 35 %    LA volume 96.76 cm3    LV mass 178.68 g    Left Ventricle Relative Wall Thickness 0.36 cm    AV " valve area 1.69 cm2    AV Velocity Ratio 0.47     AV index (prosthetic) 0.47     MV valve area p 1/2 method 4.47 cm2    E/A ratio 1.73     Mean e' 0.10 m/s    Pulm vein S/D ratio 1.00     LVOT area 3.6 cm2    LVOT stroke volume 85.60 cm3    AV peak gradient 17 mmHg    E/E' ratio 8.50 m/s    Triscuspid Valve Regurgitation Peak Gradient 28 mmHg    BSA 2.45 m2    LV Systolic Volume Index 19.6 mL/m2    LV Diastolic Volume Index 55.15 mL/m2    LA Volume Index 40.5 mL/m2    LV Mass Index 75 g/m2    LA Volume Index (Mod) 22.7 mL/m2    Right Atrial Pressure (from IVC) 3 mmHg    TV rest pulmonary artery pressure 31 mmHg    EF 60 %    Narrative    · Mild left atrial enlargement.  · The left ventricle is normal in size with normal systolic function.  · Normal left ventricular diastolic function.  · Normal right ventricular size with normal right ventricular systolic   function.  · Aortic sclerosis w/o stenosis.  · The estimated PA systolic pressure is 31 mmHg.  · Normal central venous pressure (3 mmHg).          KARSON:  Results for orders placed or performed during the hospital encounter of 04/07/22   Transesophageal echo (KARSON)   Result Value Ref Range    BSA 2.41 m2    EF 40 %    Sinus 3.00 cm    STJ 2.70 cm    Ascending aorta 3.00 cm    Narrative    · Irregular rhythm was present during the study. Tachycardia was present   during the study. There  · No thrombus is present in the appendage. CAREY occluder is absent. Normal   appendage velocities.  · The estimated ejection fraction is 40%. Significant beat to beat   variability.  · The left ventricle is normal in size with mildly decreased systolic   function.  · Normal right ventricular size with normal right ventricular systolic   function.  · Aortic valve sclerosis with mildy restricted right leaflet without any   signifcant stenosis.  · Grade 3 plaque present in the transverse aorta and descending aorta.          ASSESSMENT/PLAN:           Pre-op Assessment    I have reviewed  the Patient Summary Reports.     I have reviewed the Nursing Notes.    I have reviewed the Medications.     Review of Systems  Anesthesia Hx:  No problems with previous Anesthesia  History of prior surgery of interest to airway management or planning: Denies Family Hx of Anesthesia complications.   Denies Personal Hx of Anesthesia complications.   Social:  Non-Smoker    Hematology/Oncology:  Hematology Normal   Oncology Normal     EENT/Dental:EENT/Dental Normal   Cardiovascular:   Hypertension CAD      Pulmonary:   Sleep Apnea, CPAP    Renal/:   Chronic Renal Disease    Hepatic/GI:  Hepatic/GI Normal    Musculoskeletal:  Musculoskeletal Normal    Neurological:  Neurology Normal    Endocrine:   Diabetes    Psych:  Psychiatric Normal           Physical Exam  General: Alert and Oriented    Airway:  Mallampati: II / II  Mouth Opening: Normal  TM Distance: Normal  Tongue: Normal  Neck ROM: Normal ROM    Dental:  Intact    Chest/Lungs:  Clear to auscultation, Normal Respiratory Rate    Heart:  Rate: Normal  Rhythm: Regular Rhythm  Sounds: Normal        Anesthesia Plan  Type of Anesthesia, risks & benefits discussed:    Anesthesia Type: Gen ETT  Intra-op Monitoring Plan: Standard ASA Monitors and Art Line  Post Op Pain Control Plan: multimodal analgesia  Airway Plan: Direct  Informed Consent: Informed consent signed with the Patient and all parties understand the risks and agree with anesthesia plan.  All questions answered.   ASA Score: 3    Ready For Surgery From Anesthesia Perspective.     .

## 2023-03-14 ENCOUNTER — HOSPITAL ENCOUNTER (INPATIENT)
Facility: HOSPITAL | Age: 68
LOS: 6 days | Discharge: HOME HEALTH | DRG: 327 | End: 2023-03-20
Attending: STUDENT IN AN ORGANIZED HEALTH CARE EDUCATION/TRAINING PROGRAM | Admitting: STUDENT IN AN ORGANIZED HEALTH CARE EDUCATION/TRAINING PROGRAM
Payer: MEDICARE

## 2023-03-14 ENCOUNTER — ANESTHESIA (OUTPATIENT)
Dept: SURGERY | Facility: HOSPITAL | Age: 68
DRG: 327 | End: 2023-03-14
Payer: MEDICARE

## 2023-03-14 DIAGNOSIS — J38.3: Primary | ICD-10-CM

## 2023-03-14 DIAGNOSIS — C15.9 ESOPHAGEAL ADENOCARCINOMA: ICD-10-CM

## 2023-03-14 PROBLEM — E11.22 CKD STAGE 3 DUE TO TYPE 2 DIABETES MELLITUS: Chronic | Status: ACTIVE | Noted: 2022-09-13

## 2023-03-14 PROBLEM — N18.30 CKD STAGE 3 DUE TO TYPE 2 DIABETES MELLITUS: Chronic | Status: ACTIVE | Noted: 2022-09-13

## 2023-03-14 LAB
ABO + RH BLD: NORMAL
ALBUMIN SERPL BCP-MCNC: 3.3 G/DL (ref 3.5–5.2)
ALLENS TEST: ABNORMAL
ALP SERPL-CCNC: 62 U/L (ref 55–135)
ALT SERPL W/O P-5'-P-CCNC: 185 U/L (ref 10–44)
ANION GAP SERPL CALC-SCNC: 10 MMOL/L (ref 8–16)
AST SERPL-CCNC: 170 U/L (ref 10–40)
BASOPHILS # BLD AUTO: 0.02 K/UL (ref 0–0.2)
BASOPHILS NFR BLD: 0.3 % (ref 0–1.9)
BILIRUB SERPL-MCNC: 0.5 MG/DL (ref 0.1–1)
BLD GP AB SCN CELLS X3 SERPL QL: NORMAL
BUN SERPL-MCNC: 18 MG/DL (ref 8–23)
CALCIUM SERPL-MCNC: 7.6 MG/DL (ref 8.7–10.5)
CHLORIDE SERPL-SCNC: 105 MMOL/L (ref 95–110)
CO2 SERPL-SCNC: 23 MMOL/L (ref 23–29)
CREAT SERPL-MCNC: 1.1 MG/DL (ref 0.5–1.4)
DELSYS: ABNORMAL
DIFFERENTIAL METHOD: ABNORMAL
EOSINOPHIL # BLD AUTO: 0 K/UL (ref 0–0.5)
EOSINOPHIL NFR BLD: 0.2 % (ref 0–8)
ERYTHROCYTE [DISTWIDTH] IN BLOOD BY AUTOMATED COUNT: 17.2 % (ref 11.5–14.5)
EST. GFR  (NO RACE VARIABLE): >60 ML/MIN/1.73 M^2
ESTIMATED AVG GLUCOSE: 143 MG/DL (ref 68–131)
GLUCOSE SERPL-MCNC: 225 MG/DL (ref 70–110)
HBA1C MFR BLD: 6.6 % (ref 4–5.6)
HCO3 UR-SCNC: 23 MMOL/L (ref 24–28)
HCT VFR BLD AUTO: 33.5 % (ref 40–54)
HCT VFR BLD CALC: 32 %PCV (ref 36–54)
HGB BLD-MCNC: 10.6 G/DL (ref 14–18)
IMM GRANULOCYTES # BLD AUTO: 0.03 K/UL (ref 0–0.04)
IMM GRANULOCYTES NFR BLD AUTO: 0.5 % (ref 0–0.5)
LACTATE SERPL-SCNC: 2.1 MMOL/L (ref 0.5–2.2)
LYMPHOCYTES # BLD AUTO: 0.2 K/UL (ref 1–4.8)
LYMPHOCYTES NFR BLD: 3.6 % (ref 18–48)
MAGNESIUM SERPL-MCNC: 1.6 MG/DL (ref 1.6–2.6)
MCH RBC QN AUTO: 30.5 PG (ref 27–31)
MCHC RBC AUTO-ENTMCNC: 31.6 G/DL (ref 32–36)
MCV RBC AUTO: 96 FL (ref 82–98)
MODE: ABNORMAL
MONOCYTES # BLD AUTO: 0.3 K/UL (ref 0.3–1)
MONOCYTES NFR BLD: 4.3 % (ref 4–15)
NEUTROPHILS # BLD AUTO: 5.6 K/UL (ref 1.8–7.7)
NEUTROPHILS NFR BLD: 91.1 % (ref 38–73)
NRBC BLD-RTO: 1 /100 WBC
PCO2 BLDA: 40.6 MMHG (ref 35–45)
PH SMN: 7.36 [PH] (ref 7.35–7.45)
PHOSPHATE SERPL-MCNC: 4.1 MG/DL (ref 2.7–4.5)
PLATELET # BLD AUTO: 115 K/UL (ref 150–450)
PMV BLD AUTO: 10.3 FL (ref 9.2–12.9)
PO2 BLDA: 75 MMHG (ref 80–100)
POC BE: -2 MMOL/L
POC IONIZED CALCIUM: 1.05 MMOL/L (ref 1.06–1.42)
POC SATURATED O2: 94 % (ref 95–100)
POC TCO2: 24 MMOL/L (ref 23–27)
POCT GLUCOSE: 167 MG/DL (ref 70–110)
POTASSIUM BLD-SCNC: 4.3 MMOL/L (ref 3.5–5.1)
POTASSIUM SERPL-SCNC: 4.8 MMOL/L (ref 3.5–5.1)
PROT SERPL-MCNC: 5.8 G/DL (ref 6–8.4)
RBC # BLD AUTO: 3.48 M/UL (ref 4.6–6.2)
SAMPLE: ABNORMAL
SITE: ABNORMAL
SODIUM BLD-SCNC: 138 MMOL/L (ref 136–145)
SODIUM SERPL-SCNC: 138 MMOL/L (ref 136–145)
WBC # BLD AUTO: 6.11 K/UL (ref 3.9–12.7)

## 2023-03-14 PROCEDURE — 84100 ASSAY OF PHOSPHORUS: CPT | Performed by: STUDENT IN AN ORGANIZED HEALTH CARE EDUCATION/TRAINING PROGRAM

## 2023-03-14 PROCEDURE — 25000003 PHARM REV CODE 250: Performed by: STUDENT IN AN ORGANIZED HEALTH CARE EDUCATION/TRAINING PROGRAM

## 2023-03-14 PROCEDURE — 88305 TISSUE EXAM BY PATHOLOGIST: CPT | Mod: 26,,, | Performed by: PATHOLOGY

## 2023-03-14 PROCEDURE — 86900 BLOOD TYPING SEROLOGIC ABO: CPT

## 2023-03-14 PROCEDURE — 83735 ASSAY OF MAGNESIUM: CPT | Performed by: STUDENT IN AN ORGANIZED HEALTH CARE EDUCATION/TRAINING PROGRAM

## 2023-03-14 PROCEDURE — 83605 ASSAY OF LACTIC ACID: CPT | Performed by: STUDENT IN AN ORGANIZED HEALTH CARE EDUCATION/TRAINING PROGRAM

## 2023-03-14 PROCEDURE — 88331 PATH CONSLTJ SURG 1 BLK 1SPC: CPT | Mod: 26,,, | Performed by: PATHOLOGY

## 2023-03-14 PROCEDURE — 37000009 HC ANESTHESIA EA ADD 15 MINS: Performed by: STUDENT IN AN ORGANIZED HEALTH CARE EDUCATION/TRAINING PROGRAM

## 2023-03-14 PROCEDURE — 88332 PATH CONSLTJ SURG EA ADD BLK: CPT | Mod: 26,,, | Performed by: PATHOLOGY

## 2023-03-14 PROCEDURE — 99900035 HC TECH TIME PER 15 MIN (STAT)

## 2023-03-14 PROCEDURE — 88331 PATH CONSLTJ SURG 1 BLK 1SPC: CPT | Performed by: PATHOLOGY

## 2023-03-14 PROCEDURE — 37799 UNLISTED PX VASCULAR SURGERY: CPT

## 2023-03-14 PROCEDURE — 88332 PR  PATH CONSULT IN SURG,W ADDN FRZ SEC: ICD-10-PCS | Mod: 26,,, | Performed by: PATHOLOGY

## 2023-03-14 PROCEDURE — D9220A PRA ANESTHESIA: ICD-10-PCS | Mod: CRNA,,, | Performed by: NURSE ANESTHETIST, CERTIFIED REGISTERED

## 2023-03-14 PROCEDURE — 44015 PR INSERT TUBE-BOWEL,ENTERAL ALIMENT: ICD-10-PCS | Mod: ,,, | Performed by: STUDENT IN AN ORGANIZED HEALTH CARE EDUCATION/TRAINING PROGRAM

## 2023-03-14 PROCEDURE — 63600175 PHARM REV CODE 636 W HCPCS: Performed by: STUDENT IN AN ORGANIZED HEALTH CARE EDUCATION/TRAINING PROGRAM

## 2023-03-14 PROCEDURE — 32674 PR THORACOSCOPY LYMPH NODE EXC: ICD-10-PCS | Mod: 82,,, | Performed by: SURGERY

## 2023-03-14 PROCEDURE — 43288 ESPHG THRSC MOBLJ: CPT | Mod: ,,, | Performed by: STUDENT IN AN ORGANIZED HEALTH CARE EDUCATION/TRAINING PROGRAM

## 2023-03-14 PROCEDURE — 43288 ESPHG THRSC MOBLJ: CPT | Mod: 82,,, | Performed by: SURGERY

## 2023-03-14 PROCEDURE — 27000646 HC AEROBIKA DEVICE

## 2023-03-14 PROCEDURE — 44015 INSERT NEEDLE CATH BOWEL: CPT | Mod: ,,, | Performed by: STUDENT IN AN ORGANIZED HEALTH CARE EDUCATION/TRAINING PROGRAM

## 2023-03-14 PROCEDURE — 85025 COMPLETE CBC W/AUTO DIFF WBC: CPT | Performed by: STUDENT IN AN ORGANIZED HEALTH CARE EDUCATION/TRAINING PROGRAM

## 2023-03-14 PROCEDURE — 88309 TISSUE EXAM BY PATHOLOGIST: CPT | Mod: 26,,, | Performed by: PATHOLOGY

## 2023-03-14 PROCEDURE — 25000003 PHARM REV CODE 250

## 2023-03-14 PROCEDURE — 43288 PR ESOPHAGECTOMY, TOTAL/NEAR TOTAL, W/THORASC MOBL: ICD-10-PCS | Mod: 82,,, | Performed by: SURGERY

## 2023-03-14 PROCEDURE — 37000008 HC ANESTHESIA 1ST 15 MINUTES: Performed by: STUDENT IN AN ORGANIZED HEALTH CARE EDUCATION/TRAINING PROGRAM

## 2023-03-14 PROCEDURE — S0030 INJECTION, METRONIDAZOLE: HCPCS

## 2023-03-14 PROCEDURE — D9220A PRA ANESTHESIA: Mod: ANES,,, | Performed by: ANESTHESIOLOGY

## 2023-03-14 PROCEDURE — 94664 DEMO&/EVAL PT USE INHALER: CPT

## 2023-03-14 PROCEDURE — 83036 HEMOGLOBIN GLYCOSYLATED A1C: CPT | Performed by: STUDENT IN AN ORGANIZED HEALTH CARE EDUCATION/TRAINING PROGRAM

## 2023-03-14 PROCEDURE — 82330 ASSAY OF CALCIUM: CPT

## 2023-03-14 PROCEDURE — 88309 PR  SURG PATH,LEVEL VI: ICD-10-PCS | Mod: 26,,, | Performed by: PATHOLOGY

## 2023-03-14 PROCEDURE — 94640 AIRWAY INHALATION TREATMENT: CPT

## 2023-03-14 PROCEDURE — 88305 TISSUE EXAM BY PATHOLOGIST: CPT | Performed by: PATHOLOGY

## 2023-03-14 PROCEDURE — 27800903 OPTIME MED/SURG SUP & DEVICES OTHER IMPLANTS: Performed by: STUDENT IN AN ORGANIZED HEALTH CARE EDUCATION/TRAINING PROGRAM

## 2023-03-14 PROCEDURE — 63600175 PHARM REV CODE 636 W HCPCS: Performed by: NURSE ANESTHETIST, CERTIFIED REGISTERED

## 2023-03-14 PROCEDURE — 63600175 PHARM REV CODE 636 W HCPCS: Mod: JZ,JG | Performed by: STUDENT IN AN ORGANIZED HEALTH CARE EDUCATION/TRAINING PROGRAM

## 2023-03-14 PROCEDURE — D9220A PRA ANESTHESIA: ICD-10-PCS | Mod: ANES,,, | Performed by: ANESTHESIOLOGY

## 2023-03-14 PROCEDURE — 25000003 PHARM REV CODE 250: Performed by: ANESTHESIOLOGY

## 2023-03-14 PROCEDURE — 84295 ASSAY OF SERUM SODIUM: CPT

## 2023-03-14 PROCEDURE — C1894 INTRO/SHEATH, NON-LASER: HCPCS | Performed by: STUDENT IN AN ORGANIZED HEALTH CARE EDUCATION/TRAINING PROGRAM

## 2023-03-14 PROCEDURE — 86920 COMPATIBILITY TEST SPIN: CPT

## 2023-03-14 PROCEDURE — 94761 N-INVAS EAR/PLS OXIMETRY MLT: CPT

## 2023-03-14 PROCEDURE — 80053 COMPREHEN METABOLIC PANEL: CPT | Performed by: STUDENT IN AN ORGANIZED HEALTH CARE EDUCATION/TRAINING PROGRAM

## 2023-03-14 PROCEDURE — 82962 GLUCOSE BLOOD TEST: CPT | Performed by: STUDENT IN AN ORGANIZED HEALTH CARE EDUCATION/TRAINING PROGRAM

## 2023-03-14 PROCEDURE — 27000221 HC OXYGEN, UP TO 24 HOURS

## 2023-03-14 PROCEDURE — 88332 PATH CONSLTJ SURG EA ADD BLK: CPT | Performed by: PATHOLOGY

## 2023-03-14 PROCEDURE — 94799 UNLISTED PULMONARY SVC/PX: CPT

## 2023-03-14 PROCEDURE — 36620 ARTERIAL: ICD-10-PCS | Mod: 59,GC,, | Performed by: ANESTHESIOLOGY

## 2023-03-14 PROCEDURE — 32674 THORACOSCOPY LYMPH NODE EXC: CPT | Mod: 82,,, | Performed by: SURGERY

## 2023-03-14 PROCEDURE — 36620 INSERTION CATHETER ARTERY: CPT | Mod: 59,GC,, | Performed by: ANESTHESIOLOGY

## 2023-03-14 PROCEDURE — 43288 PR ESOPHAGECTOMY, TOTAL/NEAR TOTAL, W/THORASC MOBL: ICD-10-PCS | Mod: ,,, | Performed by: STUDENT IN AN ORGANIZED HEALTH CARE EDUCATION/TRAINING PROGRAM

## 2023-03-14 PROCEDURE — 82803 BLOOD GASES ANY COMBINATION: CPT

## 2023-03-14 PROCEDURE — 88305 TISSUE EXAM BY PATHOLOGIST: ICD-10-PCS | Mod: 26,,, | Performed by: PATHOLOGY

## 2023-03-14 PROCEDURE — C1729 CATH, DRAINAGE: HCPCS | Performed by: STUDENT IN AN ORGANIZED HEALTH CARE EDUCATION/TRAINING PROGRAM

## 2023-03-14 PROCEDURE — 84132 ASSAY OF SERUM POTASSIUM: CPT

## 2023-03-14 PROCEDURE — 32674 PR THORACOSCOPY LYMPH NODE EXC: ICD-10-PCS | Mod: ,,, | Performed by: STUDENT IN AN ORGANIZED HEALTH CARE EDUCATION/TRAINING PROGRAM

## 2023-03-14 PROCEDURE — 25000003 PHARM REV CODE 250: Performed by: NURSE ANESTHETIST, CERTIFIED REGISTERED

## 2023-03-14 PROCEDURE — 36000712 HC OR TIME LEV V 1ST 15 MIN: Performed by: STUDENT IN AN ORGANIZED HEALTH CARE EDUCATION/TRAINING PROGRAM

## 2023-03-14 PROCEDURE — 85014 HEMATOCRIT: CPT

## 2023-03-14 PROCEDURE — D9220A PRA ANESTHESIA: Mod: CRNA,,, | Performed by: NURSE ANESTHETIST, CERTIFIED REGISTERED

## 2023-03-14 PROCEDURE — 88309 TISSUE EXAM BY PATHOLOGIST: CPT | Performed by: PATHOLOGY

## 2023-03-14 PROCEDURE — 36000713 HC OR TIME LEV V EA ADD 15 MIN: Performed by: STUDENT IN AN ORGANIZED HEALTH CARE EDUCATION/TRAINING PROGRAM

## 2023-03-14 PROCEDURE — 20000000 HC ICU ROOM

## 2023-03-14 PROCEDURE — 88307 TISSUE EXAM BY PATHOLOGIST: CPT | Performed by: PATHOLOGY

## 2023-03-14 PROCEDURE — 27201423 OPTIME MED/SURG SUP & DEVICES STERILE SUPPLY: Performed by: STUDENT IN AN ORGANIZED HEALTH CARE EDUCATION/TRAINING PROGRAM

## 2023-03-14 PROCEDURE — 32674 THORACOSCOPY LYMPH NODE EXC: CPT | Mod: ,,, | Performed by: STUDENT IN AN ORGANIZED HEALTH CARE EDUCATION/TRAINING PROGRAM

## 2023-03-14 PROCEDURE — 88331 PR  PATH CONSULT IN SURG,W FRZ SEC: ICD-10-PCS | Mod: 26,,, | Performed by: PATHOLOGY

## 2023-03-14 PROCEDURE — 25000242 PHARM REV CODE 250 ALT 637 W/ HCPCS: Performed by: STUDENT IN AN ORGANIZED HEALTH CARE EDUCATION/TRAINING PROGRAM

## 2023-03-14 DEVICE — TUBE BLLN MIC JENUNAL FEED 12F: Type: IMPLANTABLE DEVICE | Site: ABDOMEN | Status: FUNCTIONAL

## 2023-03-14 RX ORDER — MAGNESIUM SULFATE HEPTAHYDRATE 40 MG/ML
2 INJECTION, SOLUTION INTRAVENOUS
Status: DISCONTINUED | OUTPATIENT
Start: 2023-03-14 | End: 2023-03-15

## 2023-03-14 RX ORDER — CALCIUM GLUCONATE 20 MG/ML
1 INJECTION, SOLUTION INTRAVENOUS
Status: DISCONTINUED | OUTPATIENT
Start: 2023-03-14 | End: 2023-03-15

## 2023-03-14 RX ORDER — ACETAMINOPHEN 10 MG/ML
1000 INJECTION, SOLUTION INTRAVENOUS EVERY 8 HOURS
Status: CANCELLED | OUTPATIENT
Start: 2023-03-14 | End: 2023-03-15

## 2023-03-14 RX ORDER — HYDROMORPHONE HYDROCHLORIDE 1 MG/ML
0.5 INJECTION, SOLUTION INTRAMUSCULAR; INTRAVENOUS; SUBCUTANEOUS
Status: DISCONTINUED | OUTPATIENT
Start: 2023-03-14 | End: 2023-03-15

## 2023-03-14 RX ORDER — CEFAZOLIN SODIUM 1 G/3ML
INJECTION, POWDER, FOR SOLUTION INTRAMUSCULAR; INTRAVENOUS
Status: DISCONTINUED | OUTPATIENT
Start: 2023-03-14 | End: 2023-03-14

## 2023-03-14 RX ORDER — ROCURONIUM BROMIDE 10 MG/ML
INJECTION, SOLUTION INTRAVENOUS
Status: DISCONTINUED | OUTPATIENT
Start: 2023-03-14 | End: 2023-03-14

## 2023-03-14 RX ORDER — PROPOFOL 10 MG/ML
VIAL (ML) INTRAVENOUS
Status: DISCONTINUED | OUTPATIENT
Start: 2023-03-14 | End: 2023-03-14

## 2023-03-14 RX ORDER — ONDANSETRON 2 MG/ML
4 INJECTION INTRAMUSCULAR; INTRAVENOUS EVERY 6 HOURS PRN
Status: DISCONTINUED | OUTPATIENT
Start: 2023-03-14 | End: 2023-03-20 | Stop reason: HOSPADM

## 2023-03-14 RX ORDER — EPHEDRINE SULFATE 50 MG/ML
INJECTION, SOLUTION INTRAVENOUS
Status: DISCONTINUED | OUTPATIENT
Start: 2023-03-14 | End: 2023-03-14

## 2023-03-14 RX ORDER — POTASSIUM CHLORIDE 7.45 MG/ML
60 INJECTION INTRAVENOUS
Status: DISCONTINUED | OUTPATIENT
Start: 2023-03-14 | End: 2023-03-15

## 2023-03-14 RX ORDER — ONDANSETRON 2 MG/ML
INJECTION INTRAMUSCULAR; INTRAVENOUS
Status: DISCONTINUED | OUTPATIENT
Start: 2023-03-14 | End: 2023-03-14

## 2023-03-14 RX ORDER — FENTANYL CITRATE 50 UG/ML
INJECTION, SOLUTION INTRAMUSCULAR; INTRAVENOUS
Status: DISCONTINUED | OUTPATIENT
Start: 2023-03-14 | End: 2023-03-14

## 2023-03-14 RX ORDER — PANTOPRAZOLE SODIUM 40 MG/10ML
40 INJECTION, POWDER, LYOPHILIZED, FOR SOLUTION INTRAVENOUS DAILY
Status: DISCONTINUED | OUTPATIENT
Start: 2023-03-15 | End: 2023-03-20 | Stop reason: HOSPADM

## 2023-03-14 RX ORDER — HYDROMORPHONE HYDROCHLORIDE 1 MG/ML
0.2 INJECTION, SOLUTION INTRAMUSCULAR; INTRAVENOUS; SUBCUTANEOUS EVERY 5 MIN PRN
Status: CANCELLED | OUTPATIENT
Start: 2023-03-14

## 2023-03-14 RX ORDER — CALCIUM GLUCONATE 20 MG/ML
2 INJECTION, SOLUTION INTRAVENOUS
Status: DISCONTINUED | OUTPATIENT
Start: 2023-03-14 | End: 2023-03-15

## 2023-03-14 RX ORDER — CELECOXIB 200 MG/1
400 CAPSULE ORAL
Status: DISCONTINUED | OUTPATIENT
Start: 2023-03-14 | End: 2023-03-14

## 2023-03-14 RX ORDER — SODIUM CHLORIDE 0.9 % (FLUSH) 0.9 %
3 SYRINGE (ML) INJECTION
Status: CANCELLED | OUTPATIENT
Start: 2023-03-14

## 2023-03-14 RX ORDER — LEVALBUTEROL INHALATION SOLUTION 0.63 MG/3ML
0.63 SOLUTION RESPIRATORY (INHALATION)
Status: DISCONTINUED | OUTPATIENT
Start: 2023-03-14 | End: 2023-03-20 | Stop reason: HOSPADM

## 2023-03-14 RX ORDER — SODIUM CHLORIDE, SODIUM LACTATE, POTASSIUM CHLORIDE, CALCIUM CHLORIDE 600; 310; 30; 20 MG/100ML; MG/100ML; MG/100ML; MG/100ML
INJECTION, SOLUTION INTRAVENOUS CONTINUOUS
Status: DISCONTINUED | OUTPATIENT
Start: 2023-03-14 | End: 2023-03-16

## 2023-03-14 RX ORDER — FENTANYL CITRATE 50 UG/ML
25-200 INJECTION, SOLUTION INTRAMUSCULAR; INTRAVENOUS
Status: DISCONTINUED | OUTPATIENT
Start: 2023-03-14 | End: 2023-03-14

## 2023-03-14 RX ORDER — CALCIUM GLUCONATE 20 MG/ML
3 INJECTION, SOLUTION INTRAVENOUS
Status: DISCONTINUED | OUTPATIENT
Start: 2023-03-14 | End: 2023-03-15

## 2023-03-14 RX ORDER — HYDROMORPHONE HCL IN 0.9% NACL 6 MG/30 ML
PATIENT CONTROLLED ANALGESIA SYRINGE INTRAVENOUS CONTINUOUS
Status: DISCONTINUED | OUTPATIENT
Start: 2023-03-14 | End: 2023-03-16

## 2023-03-14 RX ORDER — DEXAMETHASONE SODIUM PHOSPHATE 4 MG/ML
INJECTION, SOLUTION INTRA-ARTICULAR; INTRALESIONAL; INTRAMUSCULAR; INTRAVENOUS; SOFT TISSUE
Status: DISCONTINUED | OUTPATIENT
Start: 2023-03-14 | End: 2023-03-14

## 2023-03-14 RX ORDER — ENOXAPARIN SODIUM 100 MG/ML
40 INJECTION SUBCUTANEOUS EVERY 24 HOURS
Status: CANCELLED | OUTPATIENT
Start: 2023-03-15

## 2023-03-14 RX ORDER — KETAMINE HCL IN 0.9 % NACL 50 MG/5 ML
SYRINGE (ML) INTRAVENOUS
Status: DISCONTINUED | OUTPATIENT
Start: 2023-03-14 | End: 2023-03-14

## 2023-03-14 RX ORDER — HYDROMORPHONE HYDROCHLORIDE 1 MG/ML
1 INJECTION, SOLUTION INTRAMUSCULAR; INTRAVENOUS; SUBCUTANEOUS ONCE
Status: COMPLETED | OUTPATIENT
Start: 2023-03-14 | End: 2023-03-14

## 2023-03-14 RX ORDER — ENOXAPARIN SODIUM 100 MG/ML
40 INJECTION SUBCUTANEOUS
Status: DISCONTINUED | OUTPATIENT
Start: 2023-03-14 | End: 2023-03-14

## 2023-03-14 RX ORDER — LIDOCAINE HYDROCHLORIDE 20 MG/ML
INJECTION, SOLUTION EPIDURAL; INFILTRATION; INTRACAUDAL; PERINEURAL
Status: DISCONTINUED | OUTPATIENT
Start: 2023-03-14 | End: 2023-03-14

## 2023-03-14 RX ORDER — TAMSULOSIN HYDROCHLORIDE 0.4 MG/1
0.4 CAPSULE ORAL DAILY
Status: DISCONTINUED | OUTPATIENT
Start: 2023-03-15 | End: 2023-03-20 | Stop reason: HOSPADM

## 2023-03-14 RX ORDER — METOPROLOL TARTRATE 1 MG/ML
5 INJECTION, SOLUTION INTRAVENOUS EVERY 6 HOURS
Status: DISCONTINUED | OUTPATIENT
Start: 2023-03-15 | End: 2023-03-16

## 2023-03-14 RX ORDER — INSULIN ASPART 100 [IU]/ML
1-10 INJECTION, SOLUTION INTRAVENOUS; SUBCUTANEOUS EVERY 6 HOURS PRN
Status: DISCONTINUED | OUTPATIENT
Start: 2023-03-14 | End: 2023-03-20 | Stop reason: HOSPADM

## 2023-03-14 RX ORDER — BUPIVACAINE HYDROCHLORIDE 7.5 MG/ML
INJECTION, SOLUTION EPIDURAL; RETROBULBAR
Status: COMPLETED | OUTPATIENT
Start: 2023-03-14 | End: 2023-03-14

## 2023-03-14 RX ORDER — SODIUM CHLORIDE 0.9 % (FLUSH) 0.9 %
10 SYRINGE (ML) INJECTION
Status: DISCONTINUED | OUTPATIENT
Start: 2023-03-14 | End: 2023-03-20 | Stop reason: HOSPADM

## 2023-03-14 RX ORDER — VASOPRESSIN 20 [USP'U]/ML
INJECTION, SOLUTION INTRAMUSCULAR; SUBCUTANEOUS
Status: DISCONTINUED | OUTPATIENT
Start: 2023-03-14 | End: 2023-03-14

## 2023-03-14 RX ORDER — GABAPENTIN 300 MG/1
300 CAPSULE ORAL 3 TIMES DAILY
Status: CANCELLED | OUTPATIENT
Start: 2023-03-14

## 2023-03-14 RX ORDER — GLUCAGON 1 MG
1 KIT INJECTION
Status: DISCONTINUED | OUTPATIENT
Start: 2023-03-14 | End: 2023-03-20 | Stop reason: HOSPADM

## 2023-03-14 RX ORDER — NALOXONE HCL 0.4 MG/ML
0.02 VIAL (ML) INJECTION
Status: DISCONTINUED | OUTPATIENT
Start: 2023-03-14 | End: 2023-03-16

## 2023-03-14 RX ORDER — PHENYLEPHRINE HCL IN 0.9% NACL 1 MG/10 ML
SYRINGE (ML) INTRAVENOUS
Status: DISCONTINUED | OUTPATIENT
Start: 2023-03-14 | End: 2023-03-14

## 2023-03-14 RX ORDER — MIDAZOLAM HYDROCHLORIDE 1 MG/ML
.5-4 INJECTION INTRAMUSCULAR; INTRAVENOUS
Status: DISCONTINUED | OUTPATIENT
Start: 2023-03-14 | End: 2023-03-14

## 2023-03-14 RX ORDER — POTASSIUM CHLORIDE 7.45 MG/ML
40 INJECTION INTRAVENOUS
Status: DISCONTINUED | OUTPATIENT
Start: 2023-03-14 | End: 2023-03-15

## 2023-03-14 RX ORDER — DEXMEDETOMIDINE HYDROCHLORIDE 100 UG/ML
INJECTION, SOLUTION INTRAVENOUS
Status: DISCONTINUED | OUTPATIENT
Start: 2023-03-14 | End: 2023-03-14

## 2023-03-14 RX ORDER — MAGNESIUM SULFATE HEPTAHYDRATE 40 MG/ML
4 INJECTION, SOLUTION INTRAVENOUS
Status: DISCONTINUED | OUTPATIENT
Start: 2023-03-14 | End: 2023-03-15

## 2023-03-14 RX ORDER — ACETAMINOPHEN 500 MG
1000 TABLET ORAL
Status: DISCONTINUED | OUTPATIENT
Start: 2023-03-14 | End: 2023-03-14

## 2023-03-14 RX ORDER — POTASSIUM CHLORIDE 7.45 MG/ML
80 INJECTION INTRAVENOUS
Status: DISCONTINUED | OUTPATIENT
Start: 2023-03-14 | End: 2023-03-15

## 2023-03-14 RX ORDER — METRONIDAZOLE 500 MG/100ML
500 INJECTION, SOLUTION INTRAVENOUS
Status: DISCONTINUED | OUTPATIENT
Start: 2023-03-14 | End: 2023-03-14

## 2023-03-14 RX ORDER — MIDAZOLAM HYDROCHLORIDE 5 MG/ML
INJECTION INTRAMUSCULAR; INTRAVENOUS
Status: DISCONTINUED | OUTPATIENT
Start: 2023-03-14 | End: 2023-03-14

## 2023-03-14 RX ADMIN — VASOPRESSIN 1 UNITS: 20 INJECTION INTRAVENOUS at 01:03

## 2023-03-14 RX ADMIN — ACETAMINOPHEN 1000 MG: 500 TABLET ORAL at 05:03

## 2023-03-14 RX ADMIN — LIDOCAINE HYDROCHLORIDE 50 MG: 20 INJECTION, SOLUTION EPIDURAL; INFILTRATION; INTRACAUDAL; PERINEURAL at 11:03

## 2023-03-14 RX ADMIN — ONDANSETRON 4 MG: 2 INJECTION INTRAMUSCULAR; INTRAVENOUS at 05:03

## 2023-03-14 RX ADMIN — METHOCARBAMOL 500 MG: 100 INJECTION, SOLUTION INTRAMUSCULAR; INTRAVENOUS at 11:03

## 2023-03-14 RX ADMIN — VASOPRESSIN 1 UNITS: 20 INJECTION INTRAVENOUS at 12:03

## 2023-03-14 RX ADMIN — CEFAZOLIN 2 G: 2 INJECTION, POWDER, FOR SOLUTION INTRAMUSCULAR; INTRAVENOUS at 11:03

## 2023-03-14 RX ADMIN — BUPIVACAINE HYDROCHLORIDE 30 ML: 7.5 INJECTION, SOLUTION EPIDURAL; RETROBULBAR at 07:03

## 2023-03-14 RX ADMIN — ROCURONIUM BROMIDE 20 MG: 10 INJECTION INTRAVENOUS at 12:03

## 2023-03-14 RX ADMIN — METHOCARBAMOL 500 MG: 100 INJECTION, SOLUTION INTRAMUSCULAR; INTRAVENOUS at 07:03

## 2023-03-14 RX ADMIN — DEXAMETHASONE SODIUM PHOSPHATE 8 MG: 4 INJECTION, SOLUTION INTRAMUSCULAR; INTRAVENOUS at 11:03

## 2023-03-14 RX ADMIN — METRONIDAZOLE 500 MG: 500 INJECTION, SOLUTION INTRAVENOUS at 07:03

## 2023-03-14 RX ADMIN — ROCURONIUM BROMIDE 60 MG: 10 INJECTION INTRAVENOUS at 07:03

## 2023-03-14 RX ADMIN — Medication 100 MCG: at 11:03

## 2023-03-14 RX ADMIN — HYDROMORPHONE HYDROCHLORIDE 1 MG: 1 INJECTION, SOLUTION INTRAMUSCULAR; INTRAVENOUS; SUBCUTANEOUS at 06:03

## 2023-03-14 RX ADMIN — EPHEDRINE SULFATE 5 MG: 50 INJECTION INTRAVENOUS at 01:03

## 2023-03-14 RX ADMIN — SUGAMMADEX 400 MG: 100 INJECTION, SOLUTION INTRAVENOUS at 05:03

## 2023-03-14 RX ADMIN — SODIUM CHLORIDE, SODIUM GLUCONATE, SODIUM ACETATE, POTASSIUM CHLORIDE, MAGNESIUM CHLORIDE, SODIUM PHOSPHATE, DIBASIC, AND POTASSIUM PHOSPHATE: .53; .5; .37; .037; .03; .012; .00082 INJECTION, SOLUTION INTRAVENOUS at 01:03

## 2023-03-14 RX ADMIN — EPHEDRINE SULFATE 5 MG: 50 INJECTION INTRAVENOUS at 02:03

## 2023-03-14 RX ADMIN — LIDOCAINE HYDROCHLORIDE 100 MG: 20 INJECTION, SOLUTION EPIDURAL; INFILTRATION; INTRACAUDAL; PERINEURAL at 07:03

## 2023-03-14 RX ADMIN — Medication 10 MG: at 01:03

## 2023-03-14 RX ADMIN — DEXMEDETOMIDINE HYDROCHLORIDE 4 MCG: 100 INJECTION, SOLUTION INTRAVENOUS at 05:03

## 2023-03-14 RX ADMIN — Medication 20 MG: at 08:03

## 2023-03-14 RX ADMIN — DEXMEDETOMIDINE HYDROCHLORIDE 8 MCG: 100 INJECTION, SOLUTION INTRAVENOUS at 05:03

## 2023-03-14 RX ADMIN — SODIUM CHLORIDE, POTASSIUM CHLORIDE, SODIUM LACTATE AND CALCIUM CHLORIDE: 600; 310; 30; 20 INJECTION, SOLUTION INTRAVENOUS at 06:03

## 2023-03-14 RX ADMIN — FENTANYL CITRATE 100 MCG: 50 INJECTION, SOLUTION INTRAMUSCULAR; INTRAVENOUS at 07:03

## 2023-03-14 RX ADMIN — ROCURONIUM BROMIDE 20 MG: 10 INJECTION INTRAVENOUS at 03:03

## 2023-03-14 RX ADMIN — ROCURONIUM BROMIDE 10 MG: 10 INJECTION INTRAVENOUS at 04:03

## 2023-03-14 RX ADMIN — METRONIDAZOLE 500 MG: 500 INJECTION, SOLUTION INTRAVENOUS at 06:03

## 2023-03-14 RX ADMIN — CELECOXIB 400 MG: 200 CAPSULE ORAL at 05:03

## 2023-03-14 RX ADMIN — ROCURONIUM BROMIDE 40 MG: 10 INJECTION INTRAVENOUS at 07:03

## 2023-03-14 RX ADMIN — Medication: at 07:03

## 2023-03-14 RX ADMIN — SODIUM CHLORIDE: 0.9 INJECTION, SOLUTION INTRAVENOUS at 07:03

## 2023-03-14 RX ADMIN — MIDAZOLAM HYDROCHLORIDE 2 MG: 5 INJECTION, SOLUTION INTRAMUSCULAR; INTRAVENOUS at 07:03

## 2023-03-14 RX ADMIN — PROPOFOL 200 MG: 10 INJECTION, EMULSION INTRAVENOUS at 07:03

## 2023-03-14 RX ADMIN — CEFAZOLIN 2 G: 2 INJECTION, POWDER, FOR SOLUTION INTRAMUSCULAR; INTRAVENOUS at 07:03

## 2023-03-14 RX ADMIN — CEFAZOLIN 2 G: 2 INJECTION, POWDER, FOR SOLUTION INTRAMUSCULAR; INTRAVENOUS at 04:03

## 2023-03-14 RX ADMIN — Medication 10 MG: at 02:03

## 2023-03-14 RX ADMIN — ROCURONIUM BROMIDE 20 MG: 10 INJECTION INTRAVENOUS at 04:03

## 2023-03-14 RX ADMIN — LEVALBUTEROL HYDROCHLORIDE 0.63 MG: 0.63 SOLUTION RESPIRATORY (INHALATION) at 08:03

## 2023-03-14 RX ADMIN — ROCURONIUM BROMIDE 20 MG: 10 INJECTION INTRAVENOUS at 02:03

## 2023-03-14 RX ADMIN — ROCURONIUM BROMIDE 30 MG: 10 INJECTION INTRAVENOUS at 10:03

## 2023-03-14 RX ADMIN — INSULIN ASPART 2 UNITS: 100 INJECTION, SOLUTION INTRAVENOUS; SUBCUTANEOUS at 06:03

## 2023-03-14 RX ADMIN — SODIUM CHLORIDE, SODIUM GLUCONATE, SODIUM ACETATE, POTASSIUM CHLORIDE, MAGNESIUM CHLORIDE, SODIUM PHOSPHATE, DIBASIC, AND POTASSIUM PHOSPHATE: .53; .5; .37; .037; .03; .012; .00082 INJECTION, SOLUTION INTRAVENOUS at 11:03

## 2023-03-14 RX ADMIN — ROCURONIUM BROMIDE 50 MG: 10 INJECTION INTRAVENOUS at 08:03

## 2023-03-14 RX ADMIN — ROCURONIUM BROMIDE 30 MG: 10 INJECTION INTRAVENOUS at 11:03

## 2023-03-14 RX ADMIN — Medication 10 MG: at 12:03

## 2023-03-14 RX ADMIN — MAGNESIUM SULFATE 2 G: 2 INJECTION INTRAVENOUS at 09:03

## 2023-03-14 RX ADMIN — ROCURONIUM BROMIDE 20 MG: 10 INJECTION INTRAVENOUS at 01:03

## 2023-03-14 RX ADMIN — SODIUM CHLORIDE, SODIUM GLUCONATE, SODIUM ACETATE, POTASSIUM CHLORIDE, MAGNESIUM CHLORIDE, SODIUM PHOSPHATE, DIBASIC, AND POTASSIUM PHOSPHATE: .53; .5; .37; .037; .03; .012; .00082 INJECTION, SOLUTION INTRAVENOUS at 04:03

## 2023-03-14 NOTE — NURSING
"SICU made aware that patient was complaining of "can't breath". Simple face mask 10 L placed back on patient. Will cont to monitor  "

## 2023-03-14 NOTE — BRIEF OP NOTE
[facility] - Surgery ([facility])  Brief Operative Note    SUMMARY     Surgery Date: 3/14/2023     Surgeon(s) and Role:     * Charles Medina Jr., MD - Primary     * Kirk Mccullough MD - Resident - Assisting     * Jessica Francois MD - Resident - Assisting     * Darian Murphy MD - Co-Surgeon        Pre-op Diagnosis:  Esophageal adenocarcinoma [C15.9]    Post-op Diagnosis:  Post-Op Diagnosis Codes:     * Esophageal adenocarcinoma [C15.9]    Procedure(s) (LRB):  XI ROBOTIC ESOPHAGECTOMY (N/A)  ROBOTIC JEJUNOSTOMY TUBE INSERTION (N/A)    Anesthesia: General    Operative Findings:   1) no evidence of distant metastasis  2) level 7 nodes sent for specimen.   3) 3 field esophagectomy performed without issue.    -1 drain to left neck   -1 chest tube to right chest   - botox injected into pylorus   4) J tube placed with 1cc fluid in balloon    Estimated Blood Loss: 125 mL    Estimated Blood Loss has been documented.         Specimens:   Specimen (24h ago, onward)       Start     Ordered    03/14/23 1720  Specimen to Pathology, Surgery General Surgery (Surgical Oncology)  Once        Comments: Pre-op Diagnosis: Esophageal adenocarcinoma [C15.9]Procedure(s):XI ROBOTIC ESOPHAGECTOMYROBOTIC JEJUNOSTOMY TUBE INSERTION Number of specimens: 3Name of specimens: 1.) Level 7 Lymph Nodes, Permanent.2.) Total Thoracic Esophagus, Proximal Stomach, Mediastinal and Abdominal Lymph Nodes, Frozen.3.) Residual Conduit, Permanent.     References:    Click here for ordering Quick Tip   Question Answer Comment   Procedure Type: General Surgery Surgical Oncology   Specimen Class: Known or suspected malignancy    Which provider would you like to cc? CHARLES MEDINA JR        03/14/23 1720                    [id number]

## 2023-03-14 NOTE — SUBJECTIVE & OBJECTIVE
Follow-up For: Procedure(s) (LRB):  XI ROBOTIC ESOPHAGECTOMY (N/A)  ROBOTIC JEJUNOSTOMY TUBE INSERTION (N/A)    Post-Operative Day: Day of Surgery     Past Medical History:   Diagnosis Date    Anticoagulant long-term use     Diabetes mellitus     Onset late 50s/early 60s    Hyperlipidemia     Hypertension     Onset late 50s/early 60s    Kidney stones     Sleep apnea     since 2006       Past Surgical History:   Procedure Laterality Date    CORONARY ANGIOGRAPHY N/A 9/30/2020    Procedure: ANGIOGRAM, CORONARY ARTERY;  Surgeon: John West MD;  Location: [facility] CATH LAB;  Service: Cardiology;  Laterality: N/A;    CYSTOSCOPY N/A 2/17/2022    Procedure: CYSTOSCOPY;  Surgeon: Lukasz Hughes MD;  [facility];  Service: Urology;  Laterality: N/A;    CYSTOSCOPY W/ URETERAL STENT PLACEMENT N/A 2/25/2022    Procedure: CYSTOSCOPY, WITH URETERAL STENT INSERTION;  Surgeon: Lukasz Hughes MD;  Location: [facility] OR [address] [place] [address];  Service: Urology;  Laterality: N/A;    ENDOSCOPIC ULTRASOUND OF UPPER GASTROINTESTINAL TRACT N/A 12/7/2022    Procedure: ULTRASOUND, UPPER GI TRACT, ENDOSCOPIC;  Surgeon: Darian Main MD;  Location: [facility] ENDO;  Service: Endoscopy;  Laterality: N/A;  Approval to hold Xarelto rec'd from Dr. Nick (see t/e 12/5/22)-DS    ESOPHAGOGASTRODUODENOSCOPY N/A 11/17/2022    Procedure: EGD (ESOPHAGOGASTRODUODENOSCOPY);  Surgeon: Brody Gonzales MD;  Location: [facility] (2ND FLR);  Service: Endoscopy;  Laterality: N/A;  inst via email-ok to hold Xarelto x 2 days-MS    LASER LITHOTRIPSY Left 2/25/2022    Procedure: LITHOTRIPSY, USING LASER;  Surgeon: Lukasz Hughes MD;  Location: [facility] OR [address] [place] [address];  Service: Urology;  Laterality: Left;    LEFT HEART CATHETERIZATION Left 9/30/2020    Procedure: Left heart cath;  Surgeon: John West MD;  Location: [facility] CATH LAB;  Service: Cardiology;  Laterality: Left;    LITHOTRIPSY      PARATHYROIDECTOMY  1/1/2-107    PYELOSCOPY Left 2/25/2022     Procedure: PYELOSCOPY;  Surgeon: Lukasz Hughes MD;  [facility];  Service: Urology;  Laterality: Left;    TRANSESOPHAGEAL ECHOCARDIOGRAPHY N/A 2022    Procedure: ECHOCARDIOGRAM, TRANSESOPHAGEAL;  Surgeon: Xander Diagnostic Provider;  Location: [facility] EP LAB;  Service: Cardiology;  Laterality: N/A;    TREATMENT OF CARDIAC ARRHYTHMIA N/A 2022    Procedure: Cardioversion or Defibrillation;  Surgeon: Iris Fisher NP;  Location: [facility] EP LAB;  Service: Cardiology;  Laterality: N/A;  afib, dccv, artie, anes, EH, 3prep    URETEROSCOPIC REMOVAL OF URETERIC CALCULUS Left 2022    Procedure: REMOVAL, CALCULUS, URETER, URETEROSCOPIC;  Surgeon: Lukasz Hughes MD;  Location: [facility] OR [address] [place] [address];  Service: Urology;  Laterality: Left;    URETEROSCOPY Left 2022    Procedure: URETEROSCOPY;  Surgeon: Lukasz Hughes MD;  Location: [address];  Service: Urology;  Laterality: Left;       Review of patient's allergies indicates:  No Known Allergies    Family History       Problem Relation (Age of Onset)    Alcohol abuse Paternal Aunt    Arthritis Mother, Father, Sister, Sister, Brother    Cancer Maternal Grandfather, Paternal Grandfather    Colon cancer Brother (32), Paternal Grandfather    Colon polyps Paternal Grandfather    Diabetes Father, Paternal Grandmother    Heart disease Father (70)    Hypertension Sister    Kidney disease Father          Tobacco Use    Smoking status: Former     Packs/day: 1.00     Years: 7.00     Pack years: 7.00     Types: Cigarettes, Cigars     Start date: 1972     Quit date:      Years since quittin.8     Passive exposure: Never    Smokeless tobacco: Never    Tobacco comments:     smoking was off and on.  cumulative 7 years.  never more than three years in one stretch or more lj   Substance and Sexual Activity    Alcohol use: Yes     Alcohol/week: 3.0 standard drinks     Types: 2 Cans of beer, 1 Shots of liquor per week     Comment: don't drink  regularly.  6 to 7 monthly    Drug use: Not Currently     Types: Marijuana    Sexual activity: Not Currently     Partners: Female     Birth control/protection: None     Comment:       Review of Systems   Constitutional: Negative.    HENT: Negative.     Eyes: Negative.    Respiratory: Negative.     Cardiovascular: Negative.    Gastrointestinal:  Positive for abdominal pain.   Endocrine: Negative.    Genitourinary: Negative.    Neurological: Negative.    Objective:     Vital Signs (Most Recent):  Temp: 98.6 °F (37 °C) (03/14/23 0558)  Pulse: 65 (03/14/23 0558)  Resp: 18 (03/14/23 0558)  BP: 136/65 (03/14/23 0558)  SpO2: 98 % (03/14/23 0558) Vital Signs (24h Range):  Temp:  [98.6 °F (37 °C)] 98.6 °F (37 °C)  Pulse:  [65] 65  Resp:  [18] 18  SpO2:  [98 %] 98 %  BP: (136)/(65) 136/65     Weight: 111.6 kg (246 lb)  Body mass index is 33.36 kg/m².      Intake/Output Summary (Last 24 hours) at 3/14/2023 1744  Last data filed at 3/14/2023 1555  Gross per 24 hour   Intake 3800 ml   Output 450 ml   Net 3350 ml       Physical Exam  Constitutional:       Appearance: Normal appearance.   HENT:      Head: Normocephalic and atraumatic.      Mouth/Throat:      Mouth: Mucous membranes are moist.      Pharynx: Oropharynx is clear.   Neck:      Comments: Site of surgical incision c/d/i. JOANN drain in place w/ minimal serosanguinous fluid.   Cardiovascular:      Rate and Rhythm: Normal rate and regular rhythm.      Pulses: Normal pulses.      Heart sounds: Normal heart sounds.   Pulmonary:      Effort: Pulmonary effort is normal.      Breath sounds: Normal breath sounds.      Comments: R sided CT in place, LIWS. ~200 cc of red tinged output.   Abdominal:      General: Abdomen is flat.      Palpations: Abdomen is soft.      Comments: 10 small surgical robotic incisions, all clean.    Musculoskeletal:         General: Normal range of motion.      Cervical back: Normal range of motion.   Skin:     General: Skin is warm and dry.    Neurological:      General: No focal deficit present.      Mental Status: He is alert and oriented to person, place, and time. Mental status is at baseline.   Psychiatric:         Mood and Affect: Mood normal.         Behavior: Behavior normal.       Vents:       Lines/Drains/Airways       Central Venous Catheter Line  Duration                  PowerPort A Cath Single Lumen 12/22/22 0912 right internal jugular 82 days              Drain  Duration                  Chest Tube 03/14/23 1021 Tube - 1 Right Midaxillary 24 Fr. <1 day         Closed/Suction Drain 03/14/23 1607 Left Neck Bulb 15 Fr. <1 day         Gastrostomy/Enterostomy 03/14/23 1658 Jejunostomy tube LUQ <1 day         Urethral Catheter 03/14/23 0726 Non-latex;Silicone;Straight-tip 16 Fr. <1 day              Arterial Line  Duration             Arterial Line 03/14/23 0953 Left Radial <1 day              Peripheral Intravenous Line  Duration                  Peripheral IV - Single Lumen 03/14/23 0540 20 G Left;Posterior Hand <1 day                    Significant Labs:    CBC/Anemia Profile:  No results for input(s): WBC, HGB, HCT, PLT, MCV, RDW, IRON, FERRITIN, RETIC, FOLATE, JUFSFYEF12, OCCULTBLOOD in the last 48 hours.     Chemistries:  No results for input(s): NA, K, CL, CO2, BUN, CREATININE, CALCIUM, ALBUMIN, PROT, BILITOT, ALKPHOS, ALT, AST, GLUCOSE, MG, PHOS in the last 48 hours.    All pertinent labs within the past 24 hours have been reviewed.    Significant Imaging: I have reviewed all pertinent imaging results/findings within the past 24 hours.  I have reviewed and interpreted all pertinent imaging results/findings within the past 24 hours.

## 2023-03-14 NOTE — ASSESSMENT & PLAN NOTE
Pt is a 67M w/ hx of esophageal adenocarcinoma, CAD, KUMAR on CPAP, DM2, and CKD3 who presents s/p esophagectomy, admitted to SICU for further management. Extubated, satting well on NC. HDS, no pressors.       Neuro/Psych:   -- Sedation: None  -- Pain: Dilaudid PCA, will deescalate as appropriate.              Cards:   -- HDS  -- Hx of CAD, pAfib, HFrEF (EF 40%), and HTN.  -- Most recent echo 03/2022 EF 60%, compared to 40% prior to cardioversion of Afib.   -- Cont home metoprolol.  -- Hold home lisinopril, HCTZ, and atorvastatin.   -- Will discuss with primary restarting rivaroxaban.       Pulm:   -- Goal O2 sat > 90%  -- Wean as able  -- Hx KUMAR, CPAP qHS      Renal:  -- Keep gongora for strict I/O  -- BUN/Cr ..., baseline appears normal at 20/1.4 earlier this month.   -- Reported hx of CKD3      FEN / GI:   -- Net pending  -- Replace lytes as needed  -- Nutrition: NPO, mIVF 125 cc/hr LR      ID:   -- Tm: afebrile; no leukocytosis  -- Abx not indicated      Heme/Onc:   -- H/H stable HgB 10.6  -- Daily CBC      Endo:   -- Gluc goal 140-180  -- Hx DM2, LDSSI while NPO      PPx:   Feeding: NPO  Analgesia/Sedation: Dilaudid PCA / None  Thromboembolic prevention: Restart lovenox tomorrow AM.   HOB >30: Yes  Stress Ulcer ppx: Pantoprazole  Glucose control: Critical care goal 140-180 g/dl, ISS    Lines/Drains/Airway: R sided CT, Gongora, L neck JOANN drain, A line, 2 PIVs.       Dispo/Code Status/Palliative:   -- Likely step down tomorrow / Full Code

## 2023-03-14 NOTE — ANESTHESIA PROCEDURE NOTES
Arterial    Diagnosis: esophageal cancer    Patient location during procedure: done in OR    Staffing  Authorizing Provider: Sumanth Lira MD  Performing Provider: Parker Beard MD    Anesthesiologist was present at the time of the procedure.    Preanesthetic Checklist  Completed: patient identified, IV checked, site marked, risks and benefits discussed, surgical consent, monitors and equipment checked, pre-op evaluation, timeout performed and anesthesia consent givenArterial  Skin Prep: chlorhexidine gluconate  Orientation: left  Location: radial    Catheter Size: 20 G  Catheter placement by Anatomical landmarks. Heme positive aspiration all ports. Insertion Attempts: 1  Assessment  Dressing: secured with tape and tegaderm  Patient: Tolerated well

## 2023-03-14 NOTE — ANESTHESIA PROCEDURE NOTES
BL RIYA    Patient location during procedure: OR   Block not for primary anesthetic.  Reason for block: at surgeon's request and post-op pain management   Post-op Pain Location: Abdominal wall   Start time: 3/14/2023 7:05 AM  Timeout: 3/14/2023 7:05 AM   End time: 3/14/2023 7:13 AM    Staffing  Authorizing Provider: Patrick Noel MD  Performing Provider: Carson Landers MD    Preanesthetic Checklist  Completed: patient identified, IV checked, site marked, risks and benefits discussed, surgical consent, monitors and equipment checked, pre-op evaluation and timeout performed  Peripheral Block  Patient position: sitting  Prep: ChloraPrep  Patient monitoring: heart rate, cardiac monitor, continuous pulse ox, continuous capnometry and frequent blood pressure checks  Block type: erector spinae plane  Laterality: bilateral  Injection technique: single shot  Interspace: T7-8    Needle  Needle type: Echogenic   Needle gauge: 20 G  Needle length: 4 in  Needle localization: anatomical landmarks and ultrasound guidance   -ultrasound image captured on disc.  Assessment  Injection assessment: negative aspiration, negative parasthesia and local visualized surrounding nerve  Paresthesia pain: none  Heart rate change: no  Slow fractionated injection: yes    Medications:    Medications: bupivacaine (pf) (MARCAINE) injection 0.75% - Perineural   30 mL - 3/14/2023 7:15:00 AM    Additional Notes  Patient tolerated well.  See [facility] RN record for vitals.

## 2023-03-14 NOTE — ANESTHESIA PROCEDURE NOTES
Intubation    Date/Time: 3/14/2023 7:30 AM  Performed by: Parker Beard MD  Authorized by: Sumanth Lira MD     Intubation:     Induction:  Intravenous    Intubated:  Postinduction    Mask Ventilation:  Easy with oral airway    Attempts:  1    Attempted By:  Resident anesthesiologist    Method of Intubation:  Video laryngoscopy    Blade:  Dowling 3    Laryngeal View Grade: Grade I - full view of cords      Difficult Airway Encountered?: No      Complications:  None    Airway Device:  Double lumen tube left    Airway Device Size:  41F    Style/Cuff Inflation:  Cuffed    Tube secured:  29    Secured at:  The lips    Placement Verified By:  Capnometry and Revisualization with laryngoscopy    Complicating Factors:  None    Findings Post-Intubation:  BS equal bilateral

## 2023-03-14 NOTE — TRANSFER OF CARE
Anesthesia Transfer of Care Note    Patient: Lukasz Person    Procedure(s) Performed: Procedure(s) (LRB):  XI ROBOTIC ESOPHAGECTOMY (N/A)  ROBOTIC JEJUNOSTOMY TUBE INSERTION (N/A)    Patient location: ICU    Anesthesia Type: general    Transport from OR: Transported from OR on 6-10 L/min O2 by face mask with adequate spontaneous ventilation    Post pain: adequate analgesia    Post assessment: no apparent anesthetic complications and tolerated procedure well    Post vital signs: stable    Level of consciousness: awake, alert and responds to stimulation    Nausea/Vomiting: no nausea/vomiting    Complications: none    Transfer of care protocol was followed      Last vitals:   Visit Vitals  /65 (Patient Position: Lying)   Pulse 65   Temp 36.5 °C (97.7 °F) (Axillary)   Resp 18   Ht 6' (1.829 m)   Wt 111.6 kg (246 lb)   SpO2 98%   BMI 33.36 kg/m²

## 2023-03-14 NOTE — HPI
Pt is a 67M w/ hx of esophageal adenocarcinoma, CAD, KUMAR on CPAP, DM2, and CKD3 who presents s/p esophagectomy. Pt started experiencing difficulty swallowing 8/2022. EGD performed and revealed a nodular mucosal change at the GE junction. Bx revealed adenocarcinoma. EUS performed and showed a partially obstructing tumor in lower 1/3 of the esophagus, staged T3N0Mx. Completed neoadjuvant chemoradiation. Lost about 12 lbs since start of symptoms and chemoradiation, continued experiencing odynophagia.     Pt transported to SICU extubated, satting well on NC. HDS, no pressors. AOx3. 4L LR intraop,  cc,  cc. [place], A line, 2 PIVs. Admitted to SICU for further management s/p esophagectomy.

## 2023-03-14 NOTE — H&P
[facility] Int[facility]  Critical Care - Surgery  History & Physical    Patient Name: Lukasz Person  MRN: [id number]  Admission Date: 3/14/2023  Code Status: Full Code  Attending Physician: Santana Brown Jr., MD   Primary Care Provider: Kirk Wilson MD   Principal Problem: Esophageal adenocarcinoma    Subjective:     HPI:  Pt is a 67M w/ hx of esophageal adenocarcinoma, CAD, KUMAR on CPAP, DM2, and CKD3 who presents s/p esophagectomy. Pt started experiencing difficulty swallowing 8/2022. EGD performed and revealed a nodular mucosal change at the GE junction. Bx revealed adenocarcinoma. EUS performed and showed a partially obstructing tumor in lower 1/3 of the esophagus, staged T3N0Mx. Completed neoadjuvant chemoradiation. Lost about 12 lbs since start of symptoms and chemoradiation, continued experiencing odynophagia.     Pt transported to SICU extubated, satting well on NC. HDS, no pressors. AOx3. 4L LR intraop,  cc,  cc. Shelton, A line, 2 PIVs. Admitted to SICU for further management s/p esophagectomy.      Hospital/ICU Course:  No notes on file    Follow-up For: Procedure(s) (LRB):  XI ROBOTIC ESOPHAGECTOMY (N/A)  ROBOTIC JEJUNOSTOMY TUBE INSERTION (N/A)    Post-Operative Day: Day of Surgery     Past Medical History:   Diagnosis Date    Anticoagulant long-term use     Diabetes mellitus     Onset late 50s/early 60s    Hyperlipidemia     Hypertension     Onset late 50s/early 60s    Kidney stones     Sleep apnea     since 2006       Past Surgical History:   Procedure Laterality Date    CORONARY ANGIOGRAPHY N/A 9/30/2020    Procedure: ANGIOGRAM, CORONARY ARTERY;  Surgeon: John West MD;  Location: [facility] CATH LAB;  Service: Cardiology;  Laterality: N/A;    CYSTOSCOPY N/A 2/17/2022    Procedure: CYSTOSCOPY;  Surgeon: Lukasz Hughes MD;  Location: [facility] OR [address] [place] [address];  Service: Urology;  Laterality: N/A;    CYSTOSCOPY W/ URETERAL STENT PLACEMENT N/A 2/25/2022    Procedure:  CYSTOSCOPY, WITH URETERAL STENT INSERTION;  Surgeon: Lukasz Hughes MD;  Location: [facility] OR [address] [place] [address];  Service: Urology;  Laterality: N/A;    ENDOSCOPIC ULTRASOUND OF UPPER GASTROINTESTINAL TRACT N/A 12/7/2022    Procedure: ULTRASOUND, UPPER GI TRACT, ENDOSCOPIC;  Surgeon: Darian Main MD;  Location: [facility] ENDO;  Service: Endoscopy;  Laterality: N/A;  Approval to hold Xarelto rec'd from Dr. Nick (see t/e 12/5/22)-DS    ESOPHAGOGASTRODUODENOSCOPY N/A 11/17/2022    Procedure: EGD (ESOPHAGOGASTRODUODENOSCOPY);  Surgeon: Brody Gonzales MD;  Location: [facility] ENDO (2ND FLR);  Service: Endoscopy;  Laterality: N/A;  inst via email-ok to hold Xarelto x 2 days-MS    LASER LITHOTRIPSY Left 2/25/2022    Procedure: LITHOTRIPSY, USING LASER;  Surgeon: Lukasz Hughes MD;  Location: [facility] OR [address] [place] [address];  Service: Urology;  Laterality: Left;    LEFT HEART CATHETERIZATION Left 9/30/2020    Procedure: Left heart cath;  Surgeon: John West MD;  Location: [facility] CATH LAB;  Service: Cardiology;  Laterality: Left;    LITHOTRIPSY      PARATHYROIDECTOMY  1/1/2-107    PYELOSCOPY Left 2/25/2022    Procedure: PYELOSCOPY;  Surgeon: Lukasz Hughes MD;  Location: [facility] OR [address] [place] [address];  Service: Urology;  Laterality: Left;    TRANSESOPHAGEAL ECHOCARDIOGRAPHY N/A 4/7/2022    Procedure: ECHOCARDIOGRAM, TRANSESOPHAGEAL;  Surgeon: [facility];  Location: [facility] EP LAB;  Service: Cardiology;  Laterality: N/A;    TREATMENT OF CARDIAC ARRHYTHMIA N/A 4/7/2022    Procedure: Cardioversion or Defibrillation;  Surgeon: Iris Fisher NP;  Location: [facility] EP LAB;  Service: Cardiology;  Laterality: N/A;  afib, dccv, artie, anes, EH, 3prep    URETEROSCOPIC REMOVAL OF URETERIC CALCULUS Left 2/25/2022    Procedure: REMOVAL, CALCULUS, URETER, URETEROSCOPIC;  Surgeon: Luksaz Hughes MD;  [facility];  Service: Urology;  Laterality: Left;    URETEROSCOPY Left 2/25/2022    Procedure: URETEROSCOPY;  Surgeon: Lukasz  STEVEN Hughes MD;  Location: [facility] OR [address] [place] [address];  Service: Urology;  Laterality: Left;       Review of patient's allergies indicates:  No Known Allergies    Family History       Problem Relation (Age of Onset)    Alcohol abuse Paternal Aunt    Arthritis Mother, Father, Sister, Sister, Brother    Cancer Maternal Grandfather, Paternal Grandfather    Colon cancer Brother (32), Paternal Grandfather    Colon polyps Paternal Grandfather    Diabetes Father, Paternal Grandmother    Heart disease Father (70)    Hypertension Sister    Kidney disease Father          Tobacco Use    Smoking status: Former     Packs/day: 1.00     Years: 7.00     Pack years: 7.00     Types: Cigarettes, Cigars     Start date: 1972     Quit date:      Years since quittin.8     Passive exposure: Never    Smokeless tobacco: Never    Tobacco comments:     smoking was off and on.  cumulative 7 years.  never more than three years in one stretch or more lj   Substance and Sexual Activity    Alcohol use: Yes     Alcohol/week: 3.0 standard drinks     Types: 2 Cans of beer, 1 Shots of liquor per week     Comment: don't drink regularly.  6 to 7 monthly    Drug use: Not Currently     Types: Marijuana    Sexual activity: Not Currently     Partners: Female     Birth control/protection: None     Comment:       Review of Systems   Constitutional: Negative.    HENT: Negative.     Eyes: Negative.    Respiratory: Negative.     Cardiovascular: Negative.    Gastrointestinal:  Positive for abdominal pain.   Endocrine: Negative.    Genitourinary: Negative.    Neurological: Negative.    Objective:     Vital Signs (Most Recent):  Temp: 98.6 °F (37 °C) (23 05)  Pulse: 65 (23 05)  Resp: 18 (23)  BP: 136/65 (23 0558)  SpO2: 98 % (23 0558) Vital Signs (24h Range):  Temp:  [98.6 °F (37 °C)] 98.6 °F (37 °C)  Pulse:  [65] 65  Resp:  [18] 18  SpO2:  [98 %] 98 %  BP: (136)/(65) 136/65     Weight: 111.6 kg (246 lb)  Body  mass index is 33.36 kg/m².      Intake/Output Summary (Last 24 hours) at 3/14/2023 1744  Last data filed at 3/14/2023 1555  Gross per 24 hour   Intake 3800 ml   Output 450 ml   Net 3350 ml       Physical Exam  Constitutional:       Appearance: Normal appearance.   HENT:      Head: Normocephalic and atraumatic.      Mouth/Throat:      Mouth: Mucous membranes are moist.      Pharynx: Oropharynx is clear.   Neck:      Comments: Site of surgical incision c/d/i. JOANN drain in place w/ minimal serosanguinous fluid.   Cardiovascular:      Rate and Rhythm: Normal rate and regular rhythm.      Pulses: Normal pulses.      Heart sounds: Normal heart sounds.   Pulmonary:      Effort: Pulmonary effort is normal.      Breath sounds: Normal breath sounds.      Comments: R sided CT in place, LIWS. ~200 cc of red tinged output.   Abdominal:      General: Abdomen is flat.      Palpations: Abdomen is soft.      Comments: 10 small surgical robotic incisions, all clean.    Musculoskeletal:         General: Normal range of motion.      Cervical back: Normal range of motion.   Skin:     General: Skin is warm and dry.   Neurological:      General: No focal deficit present.      Mental Status: He is alert and oriented to person, place, and time. Mental status is at baseline.   Psychiatric:         Mood and Affect: Mood normal.         Behavior: Behavior normal.       Vents:       Lines/Drains/Airways       Central Venous Catheter Line  Duration                  PowerPort A Cath Single Lumen 12/22/22 0912 right internal jugular 82 days              Drain  Duration                  Chest Tube 03/14/23 1021 Tube - 1 Right Midaxillary 24 Fr. <1 day         Closed/Suction Drain 03/14/23 1607 Left Neck Bulb 15 Fr. <1 day         Gastrostomy/Enterostomy 03/14/23 1658 Jejunostomy tube LUQ <1 day         Urethral Catheter 03/14/23 0726 Non-latex;Silicone;Straight-tip 16 Fr. <1 day              Arterial Line  Duration             Arterial Line 03/14/23  0953 Left Radial <1 day              Peripheral Intravenous Line  Duration                  Peripheral IV - Single Lumen 03/14/23 0540 20 G Left;Posterior Hand <1 day                    Significant Labs:    CBC/Anemia Profile:  No results for input(s): WBC, HGB, HCT, PLT, MCV, RDW, IRON, FERRITIN, RETIC, FOLATE, UPLCFBCY31, OCCULTBLOOD in the last 48 hours.     Chemistries:  No results for input(s): NA, K, CL, CO2, BUN, CREATININE, CALCIUM, ALBUMIN, PROT, BILITOT, ALKPHOS, ALT, AST, GLUCOSE, MG, PHOS in the last 48 hours.    All pertinent labs within the past 24 hours have been reviewed.    Significant Imaging: I have reviewed all pertinent imaging results/findings within the past 24 hours.  I have reviewed and interpreted all pertinent imaging results/findings within the past 24 hours.    Assessment/Plan:     Oncology  * Esophageal adenocarcinoma  Pt is a 67M w/ hx of esophageal adenocarcinoma, CAD, KUMAR on CPAP, DM2, and CKD3 who presents s/p esophagectomy, admitted to SICU for further management. Extubated, satting well on NC. HDS, no pressors.       Neuro/Psych:   -- Sedation: None  -- Pain: Dilaudid PCA, will deescalate as appropriate.              Cards:   -- HDS  -- Hx of CAD, pAfib, HFrEF (EF 40%), and HTN.  -- Most recent echo 03/2022 EF 60%, compared to 40% prior to cardioversion of Afib.   -- Cont home metoprolol.  -- Hold home lisinopril, HCTZ, and atorvastatin.   -- Will discuss with primary restarting rivaroxaban.       Pulm:   -- Goal O2 sat > 90%  -- Wean as able  -- Hx KUMAR, CPAP qHS      Renal:  -- Keep gongora for strict I/O  -- BUN/Cr ..., baseline appears normal at 20/1.4 earlier this month.   -- Reported hx of CKD3      FEN / GI:   -- Net pending  -- Replace lytes as needed  -- Nutrition: NPO, mIVF 125 cc/hr LR      ID:   -- Tm: afebrile; no leukocytosis  -- Abx not indicated      Heme/Onc:   -- H/H stable HgB 10.6  -- Daily CBC      Endo:   -- Gluc goal 140-180  -- Hx DM2, LDSSI while  NPO      PPx:   Feeding: NPO  Analgesia/Sedation: Dilaudid PCA / None  Thromboembolic prevention: Restart lovenox tomorrow AM.   HOB >30: Yes  Stress Ulcer ppx: Pantoprazole  Glucose control: Critical care goal 140-180 g/dl, ISS    Lines/Drains/Airway: R sided CT, Shelton, L neck JOANN drain, A line, 2 PIVs.       Dispo/Code Status/Palliative:   -- Likely step down tomorrow / Full Code      Critical secondary to Patient has a condition that poses threat to life and bodily function: Close monitoring s/p esophagectomy.      Critical care was time spent personally by me on the following activities: development of treatment plan with patient or surrogate and bedside caregivers, discussions with consultants, evaluation of patient's response to treatment, examination of patient, ordering and performing treatments and interventions, ordering and review of laboratory studies, ordering and review of radiographic studies, pulse oximetry, re-evaluation of patient's condition.  This critical care time did not overlap with that of any other provider or involve time for any procedures.     Nubia Anne MD  Critical Care - Surgery  [facility]

## 2023-03-14 NOTE — PLAN OF CARE
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       Hypertension associated with diabetes [E11.59, I15.2]  Yes     Chronic    KUMAR on CPAP [G47.33, Z99.89]  Not Applicable     Chronic    Type 2 diabetes mellitus with kidney complication, without long-term current use of insulin [E11.29]  Yes     Chronic      Resolved Hospital Problems   No resolved problems to display.       Future Appointments   Date Time Provider Department Center   3/27/2023  8:00 AM [facility] XRFLOP2 350 LB LIMIT [facility] XRAY OP [facility]   3/27/2023 11:15 AM Santana Brown Jr., MD [facility] SURONC [facility]   3/27/2023 11:30 AM Carmen Clark RD [facility] INONCSF [facility] [facility]   3/31/2023 10:10 AM LAB, HEMONC CANCER BLDG [facility] LAB HO [facility]   4/3/2023  8:30 AM Miki Medrano MD [facility] HEMONC2 [place]   4/10/2023 10:00 AM EKG, APPT [facility] EKG [facility]   4/10/2023 10:20 AM CORINA Lin MD [facility] ARRHYTH [facility]   4/21/2023  1:00 PM Javier Moulton MD [facility] RAD ONC [facility]   5/30/2023  11:30 AM PRE-ADMIT, ENDO -[facility] ENDOPP4 [facility]   11/27/2023  9:15 AM [facility]XRAY [facility]   11/27/2023  9:30 AM [facility]-US1 MASTER [facility] ULTR [facility]   11/28/2023  9:15 AM Lukasz Hughes MD [facility] UROLOG [facility]       I have seen and examined this patient face to face today. My clinical findings that support the need for the home health skilled services and home bound status are the following:  Weakness/numbness causing balance and gait disturbance due to Surgery making it taxing to leave home.    Allergies:Review of patient's allergies indicates:  No Known Allergies    Diet: NPO except for medications listed with a sip of water. (Flomax, allopurinol, lisinopril/HCTZ, Toprol XL, Prilosec, Xarelto, Crestor)    Activities: activity as tolerated    Nursing:   SN to complete comprehensive assessment including routine vital signs. Instruct on disease process and s/s of complications to report to MD. Review/verify medication list sent home with the patient at time of discharge  and instruct patient/caregiver as needed. Frequency may be adjusted depending on start of care date.    Notify MD if SBP > 160 or < 90; DBP > 90 or < 50; HR > 120 or < 50; Temp > 101    Nursing:   Dressing changes to left neck with 4x4 gauze and paper tape daily and prn.     CONSULTS:    Physical Therapy to evaluate and treat. Evaluate for home safety and equipment needs; Establish/upgrade home exercise program. Perform / instruct on therapeutic exercises, gait training, transfer training, and Range of Motion.    MISCELLANEOUS CARE:  Diabetic Care:   SN to perform and educate Diabetic management with blood glucose monitoring:, Fingerstick blood sugar every 6 hours if patient is unable to eat, and Report CBG < 60 or > 350 to physician.      Medications: Review discharge medications with patient and family and provide education.      Current Discharge Medication List        START taking these  medications    Details   hydrocodone-acetaminophen (HYCET) solution 7.5-325 mg/15mL 15 mLs by Per J Tube route every 6 (six) hours as needed for Pain.  Qty: 118 mL, Refills: 0           CONTINUE these medications which have NOT CHANGED    Details   ACCU-CHEK SOFTCLIX LANCETS Misc USE EVERY DAY  Qty: 100 each, Refills: 6      allopurinoL (ZYLOPRIM) 100 MG tablet TAKE 1 TABLET BY MOUTH EVERY DAY  Qty: 30 tablet, Refills: 10      blood sugar diagnostic (ACCU-CHEK ANTONELAL PLUS TEST STRP) Strp TEST EVERY MORNING  Qty: 50 strip, Refills: 11    Associated Diagnoses: Uncontrolled type 2 diabetes mellitus with hyperglycemia      blood-glucose meter kit Checks blood sugars 1x/daily.  Qty: 1 each, Refills: 12    Comments: Please dispense preferred on patient's insurance plan. E11.65.      citric acid-potassium citrate (POLYCITRA) 1,100-334 mg/5 mL solution Take 5 mLs (10 mEq total) by mouth 3 (three) times daily with meals.  Qty: 450 mL, Refills: 6      lisinopriL-hydrochlorothiazide (PRINZIDE,ZESTORETIC) 20-25 mg Tab TAKE 1 TABLET BY MOUTH EVERY DAY  Qty: 90 tablet, Refills: 3      magic mouthwash diphen/antac/lidoc Swish and swallow 10 ML's every 6 hours as needed (pain swallowing)  Qty: 360 mL, Refills: 0      metFORMIN 500 mg/5 mL Soln Take 1,000 mg by mouth 2 (two) times a day.  Qty: 1800 mL, Refills: 3      metoprolol succinate (TOPROL-XL) 25 MG 24 hr tablet Take 1 tablet (25 mg total) by mouth once daily.  Qty: 30 tablet, Refills: 11    Comments: .  Associated Diagnoses: Paroxysmal atrial fibrillation      omeprazole (PRILOSEC) 40 MG capsule Take 1 capsule (40 mg total) by mouth once daily.  Qty: 90 capsule, Refills: 3      rosuvastatin (CRESTOR) 20 MG tablet TAKE 1 TABLET(20 MG) BY MOUTH EVERY DAY  Qty: 30 tablet, Refills: 11    Associated Diagnoses: Mixed hyperlipidemia      tamsulosin (FLOMAX) 0.4 mg Cap Take 1 capsule (0.4 mg total) by mouth once daily.  Qty: 30 capsule, Refills: 11    Associated Diagnoses: BPH with  obstruction/lower urinary tract symptoms      testosterone (ANDROGEL) 20.25 mg/1.25 gram (1.62 %) GlPm Apply 4 pumps to shoulders daily  Qty: 2 each, Refills: 5    Associated Diagnoses: Male hypogonadism      diphenhydrAMINE-aluminum-magnesium hydroxide-simethicone-LIDOcaine HCl 2% Swish and swallow 10 mLs every 6 (six) hours as needed (pain swallowing).  Qty: 360 each, Refills: 0    Comments: Mix equal parts of :    diphenhydrAMINE (BENYLIN) 12.5 mg/5 mL  aluminum-magnesium hydroxide-simethicone (MAALOX) 200-200-20 mg/5 mL  LIDOcaine HCl 2% (XYLOCAINE)  Associated Diagnoses: Esophageal adenocarcinoma      glimepiride (AMARYL) 2 MG tablet TAKE 2 TABLETS BY MOUTH EVERY MORNING  Qty: 180 tablet, Refills: 2    Comments: **Patient requests 90 days supply**      nitroGLYCERIN (NITROSTAT) 0.4 MG SL tablet Place 1 tablet (0.4 mg total) under the tongue every 5 (five) minutes as needed for Chest pain. If you need a third tablet, call 911  Qty: 60 tablet, Refills: 12    Associated Diagnoses: Coronary artery disease involving native coronary artery of native heart without angina pectoris      rivaroxaban (XARELTO) 20 mg Tab Take 1 tablet (20 mg total) by mouth daily with dinner or evening meal.  Qty: 30 tablet, Refills: 11             Disciplines requested: Nursing Services to provide:   Other: S/P Esophagectomy 3/14/2023     Flush jejunostomy tube with 60cc water TID.     Free water flushes via jejunostomy tube @ 55cc/hour while tube feeds are infusing.     Monitor abdomen for redness, oozing from staple site, abdominal distension, or pain not controlled by pain medication.     TUBE FEEDS via jejunostomy tube: mobifriends 1.4 (or equivalent) @ 50cc/hour (3/20/23) from 2p-8a.  Increase by 10cc every day at 2pm to a goal of 80cc/hour.    Vitals signs daily. Call MD with Temperature > 101, SBP > 180, HR < 50.     Physician to follow patient's care (the person listed here will be responsible for signing ongoing orders): Other:  Dr. Darian Murphy, Dr. EZEQUIEL Lara, Dr. Scottie Murphy [phone], Dr. Santana rBown, [phone] or [phone] office [phone] fax Requested Start of Care Date: 3/14/2023    I certify that this patient is confined to his home and needs intermittent skilled nursing care and physical therapy.      Electronically Signed  _________________________________  Thalia Ramos, NP  03/20/2023

## 2023-03-15 LAB
ALBUMIN SERPL BCP-MCNC: 3.4 G/DL (ref 3.5–5.2)
ALP SERPL-CCNC: 67 U/L (ref 55–135)
ALT SERPL W/O P-5'-P-CCNC: 254 U/L (ref 10–44)
ANION GAP SERPL CALC-SCNC: 10 MMOL/L (ref 8–16)
AST SERPL-CCNC: 264 U/L (ref 10–40)
BASOPHILS # BLD AUTO: 0.01 K/UL (ref 0–0.2)
BASOPHILS NFR BLD: 0.1 % (ref 0–1.9)
BILIRUB SERPL-MCNC: 0.5 MG/DL (ref 0.1–1)
BUN SERPL-MCNC: 21 MG/DL (ref 8–23)
CALCIUM SERPL-MCNC: 8.3 MG/DL (ref 8.7–10.5)
CHLORIDE SERPL-SCNC: 104 MMOL/L (ref 95–110)
CO2 SERPL-SCNC: 24 MMOL/L (ref 23–29)
CREAT SERPL-MCNC: 1 MG/DL (ref 0.5–1.4)
DIFFERENTIAL METHOD: ABNORMAL
EOSINOPHIL # BLD AUTO: 0 K/UL (ref 0–0.5)
EOSINOPHIL NFR BLD: 0 % (ref 0–8)
ERYTHROCYTE [DISTWIDTH] IN BLOOD BY AUTOMATED COUNT: 17.4 % (ref 11.5–14.5)
EST. GFR  (NO RACE VARIABLE): >60 ML/MIN/1.73 M^2
GLUCOSE SERPL-MCNC: 201 MG/DL (ref 70–110)
GLUCOSE SERPL-MCNC: 225 MG/DL (ref 70–110)
HCO3 UR-SCNC: 22.5 MMOL/L (ref 24–28)
HCT VFR BLD AUTO: 34.3 % (ref 40–54)
HCT VFR BLD CALC: 30 %PCV (ref 36–54)
HGB BLD-MCNC: 11 G/DL (ref 14–18)
IMM GRANULOCYTES # BLD AUTO: 0.05 K/UL (ref 0–0.04)
IMM GRANULOCYTES NFR BLD AUTO: 0.7 % (ref 0–0.5)
LDH SERPL L TO P-CCNC: 1.76 MMOL/L (ref 0.36–1.25)
LYMPHOCYTES # BLD AUTO: 0.2 K/UL (ref 1–4.8)
LYMPHOCYTES NFR BLD: 3.2 % (ref 18–48)
MAGNESIUM SERPL-MCNC: 2 MG/DL (ref 1.6–2.6)
MCH RBC QN AUTO: 30.7 PG (ref 27–31)
MCHC RBC AUTO-ENTMCNC: 32.1 G/DL (ref 32–36)
MCV RBC AUTO: 96 FL (ref 82–98)
MONOCYTES # BLD AUTO: 0.7 K/UL (ref 0.3–1)
MONOCYTES NFR BLD: 9.2 % (ref 4–15)
NEUTROPHILS # BLD AUTO: 6.5 K/UL (ref 1.8–7.7)
NEUTROPHILS NFR BLD: 86.8 % (ref 38–73)
NRBC BLD-RTO: 0 /100 WBC
PCO2 BLDA: 39.4 MMHG (ref 35–45)
PH SMN: 7.37 [PH] (ref 7.35–7.45)
PHOSPHATE SERPL-MCNC: 3.8 MG/DL (ref 2.7–4.5)
PLATELET # BLD AUTO: 130 K/UL (ref 150–450)
PMV BLD AUTO: 10.1 FL (ref 9.2–12.9)
PO2 BLDA: 124 MMHG (ref 80–100)
POC BE: -3 MMOL/L
POC IONIZED CALCIUM: 1.02 MMOL/L (ref 1.06–1.42)
POC SATURATED O2: 99 % (ref 95–100)
POC TCO2: 24 MMOL/L (ref 23–27)
POCT GLUCOSE: 189 MG/DL (ref 70–110)
POCT GLUCOSE: 209 MG/DL (ref 70–110)
POTASSIUM BLD-SCNC: 4.9 MMOL/L (ref 3.5–5.1)
POTASSIUM SERPL-SCNC: 4.6 MMOL/L (ref 3.5–5.1)
PROT SERPL-MCNC: 6.2 G/DL (ref 6–8.4)
RBC # BLD AUTO: 3.58 M/UL (ref 4.6–6.2)
SAMPLE: ABNORMAL
SAMPLE: ABNORMAL
SODIUM BLD-SCNC: 137 MMOL/L (ref 136–145)
SODIUM SERPL-SCNC: 138 MMOL/L (ref 136–145)
WBC # BLD AUTO: 7.43 K/UL (ref 3.9–12.7)

## 2023-03-15 PROCEDURE — 85025 COMPLETE CBC W/AUTO DIFF WBC: CPT | Performed by: STUDENT IN AN ORGANIZED HEALTH CARE EDUCATION/TRAINING PROGRAM

## 2023-03-15 PROCEDURE — 25000242 PHARM REV CODE 250 ALT 637 W/ HCPCS: Performed by: STUDENT IN AN ORGANIZED HEALTH CARE EDUCATION/TRAINING PROGRAM

## 2023-03-15 PROCEDURE — 20600001 HC STEP DOWN PRIVATE ROOM

## 2023-03-15 PROCEDURE — 80053 COMPREHEN METABOLIC PANEL: CPT | Performed by: STUDENT IN AN ORGANIZED HEALTH CARE EDUCATION/TRAINING PROGRAM

## 2023-03-15 PROCEDURE — 25000003 PHARM REV CODE 250: Performed by: STUDENT IN AN ORGANIZED HEALTH CARE EDUCATION/TRAINING PROGRAM

## 2023-03-15 PROCEDURE — 94761 N-INVAS EAR/PLS OXIMETRY MLT: CPT

## 2023-03-15 PROCEDURE — C9113 INJ PANTOPRAZOLE SODIUM, VIA: HCPCS | Performed by: STUDENT IN AN ORGANIZED HEALTH CARE EDUCATION/TRAINING PROGRAM

## 2023-03-15 PROCEDURE — 97116 GAIT TRAINING THERAPY: CPT

## 2023-03-15 PROCEDURE — 94799 UNLISTED PULMONARY SVC/PX: CPT

## 2023-03-15 PROCEDURE — 99233 SBSQ HOSP IP/OBS HIGH 50: CPT | Mod: 24,,, | Performed by: STUDENT IN AN ORGANIZED HEALTH CARE EDUCATION/TRAINING PROGRAM

## 2023-03-15 PROCEDURE — 63600175 PHARM REV CODE 636 W HCPCS: Performed by: STUDENT IN AN ORGANIZED HEALTH CARE EDUCATION/TRAINING PROGRAM

## 2023-03-15 PROCEDURE — 27000646 HC AEROBIKA DEVICE

## 2023-03-15 PROCEDURE — 63600175 PHARM REV CODE 636 W HCPCS

## 2023-03-15 PROCEDURE — 99900035 HC TECH TIME PER 15 MIN (STAT)

## 2023-03-15 PROCEDURE — 94664 DEMO&/EVAL PT USE INHALER: CPT

## 2023-03-15 PROCEDURE — 97161 PT EVAL LOW COMPLEX 20 MIN: CPT

## 2023-03-15 PROCEDURE — 94640 AIRWAY INHALATION TREATMENT: CPT

## 2023-03-15 PROCEDURE — 99233 PR SUBSEQUENT HOSPITAL CARE,LEVL III: ICD-10-PCS | Mod: 24,,, | Performed by: STUDENT IN AN ORGANIZED HEALTH CARE EDUCATION/TRAINING PROGRAM

## 2023-03-15 PROCEDURE — 27000221 HC OXYGEN, UP TO 24 HOURS

## 2023-03-15 PROCEDURE — 83735 ASSAY OF MAGNESIUM: CPT | Performed by: STUDENT IN AN ORGANIZED HEALTH CARE EDUCATION/TRAINING PROGRAM

## 2023-03-15 PROCEDURE — 84100 ASSAY OF PHOSPHORUS: CPT | Performed by: STUDENT IN AN ORGANIZED HEALTH CARE EDUCATION/TRAINING PROGRAM

## 2023-03-15 PROCEDURE — 97535 SELF CARE MNGMENT TRAINING: CPT

## 2023-03-15 PROCEDURE — 25000003 PHARM REV CODE 250

## 2023-03-15 PROCEDURE — 97165 OT EVAL LOW COMPLEX 30 MIN: CPT

## 2023-03-15 RX ORDER — ATORVASTATIN CALCIUM 20 MG/1
20 TABLET, FILM COATED ORAL DAILY
Status: DISCONTINUED | OUTPATIENT
Start: 2023-03-15 | End: 2023-03-15

## 2023-03-15 RX ORDER — GABAPENTIN 250 MG/5ML
125 SOLUTION ORAL EVERY 8 HOURS
Status: DISCONTINUED | OUTPATIENT
Start: 2023-03-15 | End: 2023-03-16

## 2023-03-15 RX ORDER — ENOXAPARIN SODIUM 100 MG/ML
40 INJECTION SUBCUTANEOUS EVERY 24 HOURS
Status: DISCONTINUED | OUTPATIENT
Start: 2023-03-15 | End: 2023-03-20 | Stop reason: HOSPADM

## 2023-03-15 RX ORDER — ACETAMINOPHEN 650 MG/20.3ML
500 LIQUID ORAL EVERY 8 HOURS
Status: ACTIVE | OUTPATIENT
Start: 2023-03-15 | End: 2023-03-18

## 2023-03-15 RX ORDER — ACETAMINOPHEN 500 MG
1000 TABLET ORAL EVERY 8 HOURS
Status: CANCELLED | OUTPATIENT
Start: 2023-03-16

## 2023-03-15 RX ORDER — KETOROLAC TROMETHAMINE 15 MG/ML
15 INJECTION, SOLUTION INTRAMUSCULAR; INTRAVENOUS EVERY 6 HOURS
Status: DISPENSED | OUTPATIENT
Start: 2023-03-15 | End: 2023-03-18

## 2023-03-15 RX ORDER — ALLOPURINOL 100 MG/1
100 TABLET ORAL DAILY
Status: DISCONTINUED | OUTPATIENT
Start: 2023-03-16 | End: 2023-03-20

## 2023-03-15 RX ORDER — GABAPENTIN 250 MG/5ML
125 SOLUTION ORAL EVERY 8 HOURS
Status: CANCELLED | OUTPATIENT
Start: 2023-03-15

## 2023-03-15 RX ORDER — ACETAMINOPHEN 10 MG/ML
1000 INJECTION, SOLUTION INTRAVENOUS EVERY 8 HOURS
Status: CANCELLED | OUTPATIENT
Start: 2023-03-15 | End: 2023-03-16

## 2023-03-15 RX ORDER — GABAPENTIN 250 MG/5ML
125 SOLUTION ORAL EVERY 8 HOURS
Status: DISCONTINUED | OUTPATIENT
Start: 2023-03-15 | End: 2023-03-15

## 2023-03-15 RX ADMIN — METOROPROLOL TARTRATE 5 MG: 5 INJECTION, SOLUTION INTRAVENOUS at 05:03

## 2023-03-15 RX ADMIN — Medication: at 12:03

## 2023-03-15 RX ADMIN — KETOROLAC TROMETHAMINE 15 MG: 15 INJECTION, SOLUTION INTRAMUSCULAR; INTRAVENOUS at 05:03

## 2023-03-15 RX ADMIN — METHOCARBAMOL 500 MG: 100 INJECTION, SOLUTION INTRAMUSCULAR; INTRAVENOUS at 02:03

## 2023-03-15 RX ADMIN — GABAPENTIN 125 MG: 250 SOLUTION ORAL at 10:03

## 2023-03-15 RX ADMIN — METHOCARBAMOL 500 MG: 100 INJECTION, SOLUTION INTRAMUSCULAR; INTRAVENOUS at 09:03

## 2023-03-15 RX ADMIN — METHOCARBAMOL 500 MG: 100 INJECTION, SOLUTION INTRAMUSCULAR; INTRAVENOUS at 06:03

## 2023-03-15 RX ADMIN — SODIUM CHLORIDE, POTASSIUM CHLORIDE, SODIUM LACTATE AND CALCIUM CHLORIDE: 600; 310; 30; 20 INJECTION, SOLUTION INTRAVENOUS at 05:03

## 2023-03-15 RX ADMIN — ENOXAPARIN SODIUM 40 MG: 40 INJECTION SUBCUTANEOUS at 04:03

## 2023-03-15 RX ADMIN — ACETAMINOPHEN 499.51 MG: 160 SOLUTION ORAL at 02:03

## 2023-03-15 RX ADMIN — INSULIN ASPART 2 UNITS: 100 INJECTION, SOLUTION INTRAVENOUS; SUBCUTANEOUS at 01:03

## 2023-03-15 RX ADMIN — LEVALBUTEROL HYDROCHLORIDE 0.63 MG: 0.63 SOLUTION RESPIRATORY (INHALATION) at 07:03

## 2023-03-15 RX ADMIN — LEVALBUTEROL HYDROCHLORIDE 0.63 MG: 0.63 SOLUTION RESPIRATORY (INHALATION) at 01:03

## 2023-03-15 RX ADMIN — PANTOPRAZOLE SODIUM 40 MG: 40 INJECTION, POWDER, FOR SOLUTION INTRAVENOUS at 08:03

## 2023-03-15 RX ADMIN — GABAPENTIN 125 MG: 250 SOLUTION ORAL at 02:03

## 2023-03-15 RX ADMIN — INSULIN ASPART 4 UNITS: 100 INJECTION, SOLUTION INTRAVENOUS; SUBCUTANEOUS at 06:03

## 2023-03-15 RX ADMIN — INSULIN ASPART 4 UNITS: 100 INJECTION, SOLUTION INTRAVENOUS; SUBCUTANEOUS at 04:03

## 2023-03-15 RX ADMIN — KETOROLAC TROMETHAMINE 15 MG: 15 INJECTION, SOLUTION INTRAMUSCULAR; INTRAVENOUS at 11:03

## 2023-03-15 RX ADMIN — ACETAMINOPHEN 499.51 MG: 160 SOLUTION ORAL at 09:03

## 2023-03-15 RX ADMIN — METOROPROLOL TARTRATE 5 MG: 5 INJECTION, SOLUTION INTRAVENOUS at 11:03

## 2023-03-15 RX ADMIN — TAMSULOSIN HYDROCHLORIDE 0.4 MG: 0.4 CAPSULE ORAL at 08:03

## 2023-03-15 NOTE — PROGRESS NOTES
[facility] Int[facility]  General Surgery  Progress Note    Subjective:     History of Present Illness:  No notes on file    Post-Op Info:  Procedure(s) (LRB):  XI ROBOTIC ESOPHAGECTOMY (N/A)  ROBOTIC JEJUNOSTOMY TUBE INSERTION (N/A)   1 Day Post-Op     Interval History: POD1 esophagectomy. Reports some hoarseness. Overall feels well, reporting coughing some phlegm. On CPAP nightly at home, did not use CPAP last night. On 4L NC. CT 430cc ss output on suction. Neck JOANN drain with 30cc ss output. Abdominal pain minimal to none.    Medications:  Continuous Infusions:   hydromorphone in 0.9 % NaCl 6 mg/30 ml      lactated ringers 50 mL/hr at 03/15/23 0603     Scheduled Meds:   enoxaparin  40 mg Subcutaneous Daily    gabapentin  125 mg Oral Q8H    ketorolac  15 mg Intravenous Q6H    levalbuterol  0.63 mg Nebulization Q6H WAKE    methocarbamol (ROBAXIN) IVPB  500 mg Intravenous Q8H    metoprolol  5 mg Intravenous Q6H    pantoprazole  40 mg Intravenous Daily    tamsulosin  0.4 mg Oral Daily     PRN Meds:calcium gluconate IVPB, calcium gluconate IVPB, calcium gluconate IVPB, glucagon (human recombinant), insulin aspart U-100, magnesium sulfate IVPB, magnesium sulfate IVPB, naloxone, ondansetron, potassium chloride **AND** potassium chloride **AND** potassium chloride, sodium chloride 0.9%, sodium phosphate IVPB, sodium phosphate IVPB, sodium phosphate IVPB     Review of patient's allergies indicates:  No Known Allergies  Objective:     Vital Signs (Most Recent):  Temp: 97.9 °F (36.6 °C) (03/15/23 0301)  Pulse: (!) 53 (03/15/23 0500)  Resp: 11 (03/15/23 0500)  BP: (!) 145/61 (03/15/23 0500)  SpO2: 98 % (03/15/23 0500)   Vital Signs (24h Range):  Temp:  [97.7 °F (36.5 °C)-98.2 °F (36.8 °C)] 97.9 °F (36.6 °C)  Pulse:  [51-60] 53  Resp:  [10-30] 11  SpO2:  [92 %-99 %] 98 %  BP: (140-170)/(57-78) 145/61  Arterial Line BP: (141-172)/(57-74) 147/60     Weight: 111.6 kg (246 lb)  Body mass index is 33.36  kg/m².    Intake/Output - Last 3 Shifts         03/13 0700  03/14 0659 03/14 0700  03/15 0659    I.V. (mL/kg)  1044.5 (9.4)    IV Piggyback  3995.6    Total Intake(mL/kg)  5040.1 (45.2)    Urine (mL/kg/hr)  1135 (0.4)    Drains  30    Blood  125    Chest Tube  430    Total Output  1720    Net  +3320.1                  Physical Exam  Constitutional:       General: He is not in acute distress.  Neck:      Comments: Left lateral neck drain with ss output  Cardiovascular:      Rate and Rhythm: Normal rate and regular rhythm.   Pulmonary:      Effort: Pulmonary effort is normal. No respiratory distress.      Breath sounds: Normal breath sounds.      Comments: R sided CT to suction with ss output, no air leaks   Abdominal:      General: Abdomen is flat. Bowel sounds are normal. There is no distension.      Palpations: Abdomen is soft.      Tenderness: There is abdominal tenderness (appropriate TTP, non peritonitic). There is no guarding.      Comments: J tube in LUQ  Port incisions c/d/i       Significant Labs:  I have reviewed all pertinent lab results within the past 24 hours.  CBC:   Recent Labs   Lab 03/15/23  0346   WBC 7.43   RBC 3.58*   HGB 11.0*   HCT 34.3*   *   MCV 96   MCH 30.7   MCHC 32.1     BMP:   Recent Labs   Lab 03/15/23  0346   *      K 4.6      CO2 24   BUN 21   CREATININE 1.0   CALCIUM 8.3*   MG 2.0       Significant Diagnostics:  I have reviewed all pertinent imaging results/findings within the past 24 hours.    Assessment/Plan:     * Esophageal adenocarcinoma  Lukasz Person is a 67 y.o. with PMHx of HTN, T2DM, KUMAR on CPAP, CAD, CKD3, A fib, HFrEF s/p XI ROBOTIC ESOPHAGECTOMY (N/A), ROBOTIC JEJUNOSTOMY TUBE INSERTION (N/A) 1 Day Post-Op    - NPO  - decrease mIVF to 50/hr  - d/c A line  - ENT consulted for hoarseness and possible medialization of nerve  - pending AM CXR  - continue PCA  - stepdown from SICU  - PRN nausea meds  - Replace electrolytes PRN  - DVT prophylaxis   -  OOBTC/Ambulate  - aggressive pulmonary toilet  - IS  - PT/OT    Dispo: stable for stepdown to [facility] Francois MD  General Surgery  [facility] - Surgical Intensive Care

## 2023-03-15 NOTE — PLAN OF CARE
[address] [facility] [facility]  Initial Discharge Assessment       Primary Care Provider: Kirk Wilson MD    Admission Diagnosis: Esophageal adenocarcinoma [C15.9]    Admission Date: 3/14/2023  Expected Discharge Date: 3/19/2023    Discharge Barriers Identified: None    Payor: MEDICARE / Plan: MEDICARE PART A & B / Product Type: Government /     Extended Emergency Contact Information  Primary Emergency Contact: Faviola Person  Mobile Phone: [phone]  Relation: Spouse  Preferred language: English   needed? No    Discharge Plan A: [id number] - [place], [place] - [address] AT [facility] [address] [place]  [address] [place] [address] [place] [address]  Phone: [phone] Fax: [phone]      Initial Assessment (most recent)       Adult Discharge Assessment - 03/15/23 1359          Discharge Assessment    Assessment Type Discharge Planning Brief Assessment     Confirmed/corrected address, phone number and insurance Yes     Confirmed Demographics Correct on Facesheet     Source of Information patient     When was your last doctors appointment? 02/28/23     Does patient/caregiver understand observation status Yes     Communicated ANDRZEJ with patient/caregiver Yes     Reason For Admission Esophageal Andnocarcinoma     People in Home spouse     Facility Arrived From: Home     Do you expect to return to your current living situation? Yes     Do you have help at home or someone to help you manage your care at home? Yes     Who are your caregiver(s) and their phone number(s)? [phone]     Prior to hospitilization cognitive status: Alert/Oriented     Current cognitive status: Alert/Oriented     Home Layout Able to live on 1st floor     Equipment Currently Used at Home none     Readmission within 30 days? No     Patient currently being followed by outpatient case management? No     Do you currently have service(s) that help you manage your care at home? No     Do you take  prescription medications? Yes     Do you have prescription coverage? Yes     Coverage Medicare     Do you have any problems affording any of your prescribed medications? No     Is the patient taking medications as prescribed? yes     Who is going to help you get home at discharge? [phone]     How do you get to doctors appointments? car, drives self     Are you on dialysis? No     Do you take coumadin? No     Discharge Plan A Home Health     DME Needed Upon Discharge  nutrition supplies     Discharge Plan discussed with: Spouse/sig other;Patient     Discharge Barriers Identified None                   Spoke to pt. Pt lives at home with [phone]. Post hospital  stay wife will be pt support person and pt. has transportation at d/c with wife. There have been no hospitalizations within the last 30 days per pt and pt wife. Verified pt PCP and preferred pharmacy. Pt stated not on Coumadin and is not receiving dialysis. All questions answered regarding case management/ discharge planning , pt verbalized understanding. Discharge booklet with SW contact information given to pt.     Vera Myles LCSW  Case Management/[facility]  [phone]

## 2023-03-15 NOTE — PT/OT/SLP EVAL
Occupational Therapy   Co-Evaluation/Treatment    Name: Lukasz Person  MRN: [id number]  Admitting Diagnosis: Esophageal adenocarcinoma  Recent Surgery: Procedure(s) (LRB):  XI ROBOTIC ESOPHAGECTOMY (N/A)  ROBOTIC JEJUNOSTOMY TUBE INSERTION (N/A) 1 Day Post-Op    Recommendations:     Discharge Recommendations: home    Assessment:     Lukasz Person is a 67 y.o. male with a medical diagnosis of Esophageal adenocarcinoma. Performance deficits affecting function: weakness, impaired endurance, impaired functional mobility, pain, decreased upper extremity function, decreased lower extremity function. Pt tolerated therapy well. Pt pleasant and engaged in therapy. Pt was dizzy when standing, pt encouraged to breathe slowly. Pt anticipated to progress well and would be able to go home when medically appropriate for D/C. Co-eval completed to optimize functional performance and safety given pt impaired tolerance for activity.     Rehab Prognosis: Good; patient would benefit from acute skilled OT services to address these deficits and reach maximum level of function.       Plan:     Patient to be seen 3 x/week to address the above listed problems via self-care/home management, therapeutic activities, therapeutic exercises  Plan of Care Expires: 03/29/23  Plan of Care Reviewed with: patient, spouse    Subjective     Pt agreeable to therapy.   Pt c/o gongora tugging on him, nsg notified and therapy requested for anchor to be placed for gongora.     Occupational Profile:  Living Environment: Pt lives with his wife in a [facility] with 5 ESTEBAN with R hand rail. Pt uses walk-in shower.   Previous level of function: Pt I in all ADLs and ambulated with no AD.  Equipment Used at Home:  straight cane  Assistance upon Discharge: Pt will have assistance from wife upon D/C.     Pain/Comfort:  Pain Rating 1: 4/10  Location 1: throat  Pain Addressed 1: Reposition, Distraction    Patients cultural, spiritual, Baptism conflicts given the current situation:  no    Objective:     Communicated with: nsg prior to session.  Patient found supine with J Tube, telemetry, peripheral IV, JOANN drain, chest tube, gongora catheter, PCA pump, oxygen upon OT entry to room.  Pt spouse present in room upon entry, left during session, came back at the end.     General Precautions: Standard, fall  Respiratory Status: Nasal cannula, flow 3 L/min    Occupational Performance:    Bed Mobility:    Patient completed Scooting/Bridging with stand by assistance  Patient completed Supine to Sit with moderate assistance    Functional Mobility/Transfers:  Patient completed Sit <> Stand Transfer with contact guard assistance  with  no assistive device   Functional Mobility: Pt stood for 8 mins with CGA. Pt ambulated in room with CGA with B HHA.     Activities of Daily Living:  Grooming: Pt completed oral care with set-up while standing by table    Cognitive/Visual Perceptual:  Pt AxO x 4.   Pt vision intact.     Physical Exam:  Pt B UE ROM and strength WFL.  Pt B  strength WFL.     AMPAC 6 Click ADL:  AMPAC Total Score: 21    Treatment & Education:  Ed pt with OT POC and safety with functional mobility and ADL skills.     Patient left up in chair with all lines intact, call button in reach, nsg notified, and spouse present    GOALS:   Multidisciplinary Problems       Occupational Therapy Goals          Problem: Occupational Therapy    Goal Priority Disciplines Outcome Interventions   Occupational Therapy Goal     OT, PT/OT Ongoing, Progressing    Description: Goals to be met by: 3/29/23     Patient will increase functional independence with ADLs by performing:    Feeding with Supervision.  UE Dressing with Supervision.  LE Dressing with Supervision.  Grooming while standing with Supervision.  Toileting from toilet with Supervision for hygiene and clothing management.                          History:     Past Medical History:   Diagnosis Date    Anticoagulant long-term use     Diabetes mellitus      Onset late 50s/early 60s    Hyperlipidemia     Hypertension     Onset late 50s/early 60s    Kidney stones     Sleep apnea     since 2006         Past Surgical History:   Procedure Laterality Date    CORONARY ANGIOGRAPHY N/A 9/30/2020    Procedure: ANGIOGRAM, CORONARY ARTERY;  Surgeon: John West MD;  Location: [facility] CATH LAB;  Service: Cardiology;  Laterality: N/A;    CYSTOSCOPY N/A 2/17/2022    Procedure: CYSTOSCOPY;  Surgeon: Lukasz Hughes MD;  Location: [facility] OR [address] [place] [address];  Service: Urology;  Laterality: N/A;    CYSTOSCOPY W/ URETERAL STENT PLACEMENT N/A 2/25/2022    Procedure: CYSTOSCOPY, WITH URETERAL STENT INSERTION;  Surgeon: Lukasz Hughes MD;  Location: [facility] OR [address] [place] [address];  Service: Urology;  Laterality: N/A;    ENDOSCOPIC ULTRASOUND OF UPPER GASTROINTESTINAL TRACT N/A 12/7/2022    Procedure: ULTRASOUND, UPPER GI TRACT, ENDOSCOPIC;  Surgeon: Darian Main MD;  Location: [facility] ENDO;  Service: Endoscopy;  Laterality: N/A;  Approval to hold Xarelto rec'd from Dr. Nick (see t/e 12/5/22)-DS    ESOPHAGOGASTRODUODENOSCOPY N/A 11/17/2022    Procedure: EGD (ESOPHAGOGASTRODUODENOSCOPY);  Surgeon: Brody Gonzales MD;  Location: [facility] (2ND FLR);  Service: Endoscopy;  Laterality: N/A;  inst via email-ok to hold Xarelto x 2 days-MS    LASER LITHOTRIPSY Left 2/25/2022    Procedure: LITHOTRIPSY, USING LASER;  Surgeon: Lukasz Hughes MD;  Location: [address];  Service: Urology;  Laterality: Left;    LEFT HEART CATHETERIZATION Left 9/30/2020    Procedure: Left heart cath;  Surgeon: John West MD;  Location: [facility] CATH LAB;  Service: Cardiology;  Laterality: Left;    LITHOTRIPSY      PARATHYROIDECTOMY  1/1/2-107    PYELOSCOPY Left 2/25/2022    Procedure: PYELOSCOPY;  Surgeon: Lukasz Hughes MD;  Location: [address];  Service: Urology;  Laterality: Left;    ROBOT-ASSISTED SURGICAL REMOVAL OF ESOPHAGUS USING DA CARLOS XI N/A 3/14/2023    Procedure: XI ROBOTIC ESOPHAGECTOMY;   Surgeon: Santana Brown Jr., MD;  Location: [facility] OR 2ND FLR;  Service: General;  Laterality: N/A;  Abdomen, Chest and Neck    ROBOTIC JEJUNOSTOMY N/A 3/14/2023    Procedure: ROBOTIC JEJUNOSTOMY TUBE INSERTION;  Surgeon: Santana Brown Jr., MD;  Location: [facility] OR 2ND FLR;  Service: General;  Laterality: N/A;    TRANSESOPHAGEAL ECHOCARDIOGRAPHY N/A 4/7/2022    Procedure: ECHOCARDIOGRAM, TRANSESOPHAGEAL;  Surgeon: [facility] Diagnostic Provider;  Location: [facility] EP LAB;  Service: Cardiology;  Laterality: N/A;    TREATMENT OF CARDIAC ARRHYTHMIA N/A 4/7/2022    Procedure: Cardioversion or Defibrillation;  Surgeon: Iris Fisher NP;  Location: [facility] EP LAB;  Service: Cardiology;  Laterality: N/A;  afib, dccv, artie, anes, EH, 3prep    URETEROSCOPIC REMOVAL OF URETERIC CALCULUS Left 2/25/2022    Procedure: REMOVAL, CALCULUS, URETER, URETEROSCOPIC;  Surgeon: Lukasz Hughes MD;  Location: [facility] OR 1ST FLR;  Service: Urology;  Laterality: Left;    URETEROSCOPY Left 2/25/2022    Procedure: URETEROSCOPY;  Surgeon: Lukasz Hughes MD;  Location: [facility] OR 1ST FLR;  Service: Urology;  Laterality: Left;       Time Tracking:     OT Date of Treatment: 03/15/23  OT Start Time: 1115  OT Stop Time: 1142  OT Total Time (min): 27 min    Billable Minutes:Evaluation 17  Self Care/Home Management 10    3/15/2023

## 2023-03-15 NOTE — PROGRESS NOTES
[facility] - [facility] Intensive Care  Critical Care - Surgery  Progress Note    Patient Name: Lukasz Person  MRN: [id number]  Admission Date: 3/14/2023  Hospital Length of Stay: 1 days  Code Status: Full Code  Attending Provider: Santana Brown Jr., MD  Primary Care Provider: Kirk Wilson MD   Principal Problem: Esophageal adenocarcinoma    Subjective:     Hospital/ICU Course:  No notes on file    Interval History/Significant Events: NAEON. HDS   Right CT -/300/130.   Neck JOANN -/21/9.     Follow-up For: Procedure(s) (LRB):  XI ROBOTIC ESOPHAGECTOMY (N/A)  ROBOTIC JEJUNOSTOMY TUBE INSERTION (N/A)    Post-Operative Day: 1 Day Post-Op    Objective:     Vital Signs (Most Recent):  Temp: 97.9 °F (36.6 °C) (03/15/23 0301)  Pulse: (!) 53 (03/15/23 0500)  Resp: 11 (03/15/23 0500)  BP: (!) 145/61 (03/15/23 0500)  SpO2: 98 % (03/15/23 0500)   Vital Signs (24h Range):  Temp:  [97.7 °F (36.5 °C)-98.6 °F (37 °C)] 97.9 °F (36.6 °C)  Pulse:  [51-65] 53  Resp:  [10-30] 11  SpO2:  [92 %-99 %] 98 %  BP: (136-170)/(57-78) 145/61  Arterial Line BP: (141-172)/(57-74) 147/60     Weight: 111.6 kg (246 lb)  Body mass index is 33.36 kg/m².      Intake/Output Summary (Last 24 hours) at 3/15/2023 0545  Last data filed at 3/15/2023 0500  Gross per 24 hour   Intake 5040.07 ml   Output 1720 ml   Net 3320.07 ml       Physical Exam  Constitutional:       Appearance: Normal appearance.   HENT:      Head: Normocephalic and atraumatic.      Mouth/Throat:      Mouth: Mucous membranes are moist.      Pharynx: Oropharynx is clear.   Neck:      Comments: Site of surgical incision c/d/i. JOANN drain in place w/ minimal serosanguinous fluid.   Cardiovascular:      Rate and Rhythm: Normal rate and regular rhythm.      Pulses: Normal pulses.      Heart sounds: Normal heart sounds.   Pulmonary:      Effort: Pulmonary effort is normal.      Breath sounds: Normal breath sounds.      Comments: R sided CT in place, LIWS. ~200 cc of red tinged output.   Abdominal:       General: Abdomen is flat.      Palpations: Abdomen is soft.      Comments: surgical robotic incisions, all clean.    Musculoskeletal:         General: Normal range of motion.      Cervical back: Normal range of motion.   Skin:     General: Skin is warm and dry.   Neurological:      General: No focal deficit present.      Mental Status: He is alert and oriented to person, place, and time. Mental status is at baseline.   Psychiatric:         Mood and Affect: Mood normal.         Behavior: Behavior normal.       Vents:       Lines/Drains/Airways       Central Venous Catheter Line  Duration                  PowerPort A Cath Single Lumen 12/22/22 0912 right internal jugular 82 days              Drain  Duration                  Chest Tube 03/14/23 1021 Tube - 1 Right Midaxillary 24 Fr. <1 day         Closed/Suction Drain 03/14/23 1607 Left Neck Bulb 15 Fr. <1 day         Gastrostomy/Enterostomy 03/14/23 1658 Jejunostomy tube LUQ <1 day         Urethral Catheter 03/14/23 0726 Non-latex;Silicone;Straight-tip 16 Fr. <1 day              Arterial Line  Duration             Arterial Line 03/14/23 0953 Left Radial <1 day              Peripheral Intravenous Line  Duration                  Peripheral IV - Single Lumen 03/14/23 0540 20 G Left;Posterior Hand 1 day                    Significant Labs:    CBC/Anemia Profile:  Recent Labs   Lab 03/14/23  1748 03/14/23  2041 03/15/23  0346   WBC 6.11  --  7.43   HGB 10.6*  --  11.0*   HCT 33.5* 32* 34.3*   *  --  130*   MCV 96  --  96   RDW 17.2*  --  17.4*        Chemistries:  Recent Labs   Lab 03/14/23 1748 03/15/23  0346    138   K 4.8 4.6    104   CO2 23 24   BUN 18 21   CREATININE 1.1 1.0   CALCIUM 7.6* 8.3*   ALBUMIN 3.3* 3.4*   PROT 5.8* 6.2   BILITOT 0.5 0.5   ALKPHOS 62 67   * 254*   * 264*   MG 1.6 2.0   PHOS 4.1 3.8       All pertinent labs within the past 24 hours have been reviewed.    Significant Imaging:  I have reviewed all pertinent  imaging results/findings within the past 24 hours.    Assessment/Plan:     Oncology  * Esophageal adenocarcinoma  Pt is a 67M w/ hx of esophageal adenocarcinoma, CAD, KUMAR on CPAP, DM2, and CKD3 who presents s/p esophagectomy, admitted to SICU for further management. Extubated, satting well on NC. HDS, no pressors.       Neuro/Psych:   -- Sedation: None  -- Pain: Dilaudid PCA, wean as tolerated.    --Adding multimodals- gabapentin, robaxin, toradol             Cards:   -- HDS  -- Hx of CAD, pAfib, HFrEF (EF 40%), and HTN.  -- Most recent echo 03/2022 EF 60%, compared to 40% prior to cardioversion of Afib.   -- Cont home metoprolol.  -- Hold home lisinopril, HCTZ, statin  -- Will discuss with primary restarting rivaroxaban.       Pulm:   -- Goal O2 sat > 90%  -- Wean as able  -- Hx KUMAR, CPAP qHS  --pending am CXR      Renal:  --UOP 1L/24hr  -- Keep gongora for strict I/O  -- BUN/Cr 21/1.0, baseline appears normal at 20/1.4 earlier this month.   -- Reported hx of CKD3      FEN / GI:   -- Net +3.2L/24hr  -- Replace lytes as needed  -- Nutrition: NPO, mIVF 50 cc/hr LR      ID:   -- Tm: afebrile; no leukocytosis  -- Abx not indicated      Heme/Onc:   -- H/H stable HgB 11  -- Daily CBC      Endo:   -- Gluc goal 140-180  -- Hx DM2, LDSSI while NPO      PPx:   Feeding: NPO  Analgesia/Sedation: Dilaudid PCA. Multimodals  Thromboembolic prevention: Restart lovenox   HOB >30: Yes  Stress Ulcer ppx: Pantoprazole  Glucose control: Critical care goal 140-180 g/dl, ISS    Lines/Drains/Airway: R sided CT, dc Gongora, L neck JOANN drain, dc A line, 2 PIVs.       Dispo/Code Status/Palliative:   -- Step down / Full Code      Critical care was time spent personally by me on the following activities: development of treatment plan with patient or surrogate and bedside caregivers, discussions with consultants, evaluation of patient's response to treatment, examination of patient, ordering and performing treatments and interventions, ordering and  review of laboratory studies, ordering and review of radiographic studies, pulse oximetry, re-evaluation of patient's condition.  This critical care time did not overlap with that of any other provider or involve time for any procedures.     AUDREY OCHOA MD  Critical Care - Surgery  [facility] - [facility] Int[facility]

## 2023-03-15 NOTE — ASSESSMENT & PLAN NOTE
Pt is a 67M w/ hx of esophageal adenocarcinoma, CAD, KUMAR on CPAP, DM2, and CKD3 who presents s/p esophagectomy, admitted to SICU for further management. Extubated, satting well on NC. HDS, no pressors.       Neuro/Psych:   -- Sedation: None  -- Pain: Dilaudid PCA, wean as tolerated.    --Adding multimodals- tylenol, gabapentin, robaxin, toradol             Cards:   -- HDS  -- Hx of CAD, pAfib, HFrEF (EF 40%), and HTN.  -- Most recent echo 03/2022 EF 60%, compared to 40% prior to cardioversion of Afib.   -- Cont home metoprolol.  --Cont. statin  -- Hold home lisinopril, HCTZ  -- Will discuss with primary restarting rivaroxaban.       Pulm:   -- Goal O2 sat > 90%  -- Wean as able  -- Hx KUMAR, CPAP qHS      Renal:  --UOP 1L/24hr  -- Keep gongora for strict I/O  -- BUN/Cr 21/1.0, baseline appears normal at 20/1.4 earlier this month.   -- Reported hx of CKD3      FEN / GI:   -- Net +3.2L/24hr  -- Replace lytes as needed  -- Nutrition: NPO, mIVF 125 cc/hr LR      ID:   -- Tm: afebrile; no leukocytosis  -- Abx not indicated      Heme/Onc:   -- H/H stable HgB 11  -- Daily CBC      Endo:   -- Gluc goal 140-180  -- Hx DM2, LDSSI while NPO      PPx:   Feeding: NPO  Analgesia/Sedation: Dilaudid PCA. Multimodals  Thromboembolic prevention: Restart lovenox   HOB >30: Yes  Stress Ulcer ppx: Pantoprazole  Glucose control: Critical care goal 140-180 g/dl, ISS    Lines/Drains/Airway: R sided CT, Gongora, L neck JOANN drain, dc A line, 2 PIVs.       Dispo/Code Status/Palliative:   -- Step down / Full Code

## 2023-03-15 NOTE — OP NOTE
[facility]  Surgical Oncology  Operative Note           Date of Procedure: 3/14/2023   Time: 0800    Procedure: Procedure(s) (LRB):  XI ROBOTIC ESOPHAGECTOMY (N/A)  ROBOTIC JEJUNOSTOMY TUBE INSERTION (N/A)     Surgeon(s) and Role:     * Santana Brown Jr., MD - Primary     * Darian Murphy MD - Assisting     * Kirk Mccullough MD - Resident - Assisting    Assistance: Due to having no qualified resident during critical portions of the case, Dr. Darian Murphy acted as an assistant.    Pre-Operative Diagnosis: Esophageal adenocarcinoma [C15.9]  Esophageal adenocarcinoma, distal 1/3    Post-Operative Diagnosis:   Same    Pre-Operative Variables:  Stage: T3 N0  Adjacent organ involvement: None  Chemotherapy within 90 Days: Yes  Radiation Therapy within 90 Days: Yes    Comorbidities:  Morbid obesity  Type 2 diabetes mellitus  Obstructive sleep apnea  Gout  Hyperparathyroidism  Hypertension  Severe obesity  Coronary artery disease  Heart failure with reduced ejection fraction    Anesthesia: General    Procedure:  Robotic-assisted Vj three field esophagectomy  Regional mediastinal lymph node dissection  Botox injection of the pylorus  Jejunostomy tube placement    Operative Findings:    No evidence of distant intraperitoneal metastases in the chest or abdomen  Esophageal tumor located in the distal third of the esophagus, mild radiation changes appreciated.  Resection status:  R0 pending final pathology.  No gross disease remaining post dissection.  Frozen sections on proximal and distal margins negative for malignancy  100u Botox injection into the pylorus  Stapled esophagogastrostomy performed at the neck incision after confirmation of negative margins.  Jejunostomy tube placed          Indications:  Lukasz Person is a 67 y.o. year old male with recently diagnosed T3 N0 esophageal adenocarcinoma of the distal third of the esophagus. He has completed neoadjuvant chemoradiation approximately 6 weeks ago with  restaging scans demonstrating no evidence of progressive or metastatic disease. Preoperative PFTs and cardiology risk assessment were within acceptable limits and he presents today for robotic esophagectomy.    Risks and benefits were reviewed including bleeding, infection, pain, scar, damage to surrounding structures, cardiovascular and pulmonary complications, anastomotic leak, heart attack, stroke, blood clots, jejunostomy tube complication such as leak or obstruction, need for additional procedures, death, and imponderables.  He expressed understanding and gave informed consent to proceed.    Procedure in Detail:    After informed consent was obtained and the patient was properly identified, the patient was taken to the operating room and placed in the supine position.  After the uneventful induction of general anesthesia, a double-lumen endotracheal tube was placed.  A time-out was performed according to the [facility] guidelines. Prior to positioning of the distal ends of the double-lumen tube, a bronchoscopy was performed by anesthesia. No intraluminal abnormalities were identified within the trachea, or left/right mainstem bronchus.  The bronchoscope was removed and anesthesia proceeded to position both distal ends of the double-lumen endotracheal tube appropriately.  Next the patient was positioned in left lateral decubitus position and the right chest was prepped and draped in the standard surgical fashion.       Access was gained into the right thorax with an 8 mm robotic trocar within the rib space 2 cm distal and 2 cm medial to the tip of the scapula.  Insufflation was achieved and camera was advanced a trocar confirming placement.  One additional 8 mm trocar and a 12 mm trocar was placed in the right thorax triangulating to the esophagus. An assistant 12 mm port was placed. The robot was docked and instruments placed.  The right lung was retracted medially and dissection began at the  azygous vein.  The azygous was dissected circumferentially and divided with a vascular robotic staple load. After this was accomplished the underlying thoracic esophagus was identified and dissection ensued along the medial aspect continuing inferiorly.  The inferior pulmonary ligament was divided and dissection continued along the superior aspect freeing the esophagus from the aorta, all branches were divided using the vessel sealer.  The esophagus was freed posteriorly from the pericardium and a Penrose was placed circumferentially.  Using the Penrose for retraction, the entire intrathoracic esophagus was dissected free from the diaphragm up to the thoracic inlet.  Great care was taken at the level of the song in the right and left mainstem bronchi. At the level of the song, hook electrocautery and careful blunt dissection was used to perform a subcarinal regional lymphadenectomy.  Level 7 nodes were dissected carefully from the inferior aspect of the right and left mainstem bronchi the song.  These were kept with the specimen and sent en bloc with the esophagus.  After adequate intrathoracic dissection was accomplished, the previously placed Penrose was positioned at the most superior aspect towards the thoracic inlet.  An additional Penrose was placed and positioned at the most inferior aspect along the diaphragm.  A 24 Omani chest tube was then brought in through 1 of the robotic trocar sites and secured to the chest wall with a chromic stitch.  The remainder of the trocars were removed insufflation was released.  Trocar sites were closed in 2 layers and a chest tube dressing was placed over the tube exit site.  At this time, the patient was repositioned to the supine position with a shoulder roll and anesthesia exchanged the double-lumen ET tube for a single-lumen standard endotracheal tube.    We then proceeded with the intra-abdominal portion of the operation.  The abdomen, chest and left neck were  prepped and draped in the standard surgical fashion.  Insufflation was achieved to 15 mmHg using a Veress needle at Salguero's point.  An 8 mm robotic Visiport was used to gain access into the abdomen in the right lower quadrant.  3 additional robotic trocars, 2 8 mm and 1 12 mm, were placed horizontally at the level of the umbilicus.  The robot was docked and instruments were placed.  A 5 mm port was placed under the right costal margin for the liver retractor, which was placed in position reflecting the left lateral lobe superomedially.. Dissection was initiated at the gastrocolic ligament and access was gained in to the lesser sac, ensuring to preserve the right gastroepiploic pedicle. The greater curve was mobilized in its entirety, the short gastric vessels were divided and the fundus completely mobilized to the level of the gastroesophageal junction. The remainder of the gastrocolic ligament was divided medially  the transverse colon from the stomach to the level. Once this was accomplished, the right gastroepiploic pedicle was identified and preserved. All posterior attachments to the pancreas were divided and attention was turned to the lesser curve. The gastrohepatic ligament was divided up to the right julian. The left gastric pedicle was identified and circumferentially dissected at its origin delivering all associated lymph nodes and fatty tissue up to the specimen/stomach. It was then ligated with a vascular robotic staple load. Next, the phrenoesophageal ligament was divided and access was gained to the mediastinum. The GE junction was mobilized from both left and right julian. Of note there was no tumor extension or invasion. We identified the inferior penrose placed from the thoracic dissection and delivered it into the abdomen, it was cut and removed. At this point we confirmed the esophagus, GE junction and stomach were mobilized in their entirety.    The pylorus was then identified and 100  units of Botulinum toxin was injected using a butterfly needle brought through one of the 12 mm robotic trocars. The gastric conduit was then created using green and blue robotic staple loads ensuring to fashion adequate length based off the right gastroepiploic pedicle. The hiatal defect was partially closed inferiorly with two 2-0 silk sutures. The conduit was secured to the specimen with a silk suture. All stray staples from the conduit staple line were removed and hemostasis was confirmed. The conduit was pink and well-perfused with visible pulse in the right gastroepiploic. The robot was undocked.    Attention was then turned to the neck and a 6 cm incision was made from the sternal notch to the border of the left sternocleidomastoid muscle. Sharp dissection was carried down with Bovie electrocautery, the platysma was divided, the external jugular vein and SCM were retracted laterally , strap muscles were divided and the jugular neurovascular bundle was encountered and mobilized laterally with blunt retraction and sharp dissection to preserve the vagus. Small thyroidal vessels were divided and the anterior cervical spine was encountered. The space was opened with blunt dissection and the esophagus was encountered along with the thoracic penrose. The penrose drain and esophagus was delivered into the field.    The specimen was then retrieved through the neck, divided proximally, and sent for frozen margins which were negative. Appropriate anastomotic site was identified and a 60 purple endo-JOYCE stapler used to form a side-side stapled esophago-jejunostomy. The common enterotomy was closed with interrupted  3-0 Vicryl suture.. The anastomosis was reduced into the neck. A 19 Fr JOANN drain was left in the neck which was closed in two layers with vicryl and monocryl suture.    The abdomen was reinsufflated and the pylorus was visualized at the hiatus. A jejunostomy tube was then placed robotically. The ligament of  Treitz was identified and a mobile loop of proximal bowel selected. A pursestring suture was placed and an enterotomy made. A LANDON jejunostomy tube was brought into the abdomen through a separate stab incision and placed into the small bowel. The balloon was insufflated and the pursestring was tightened. A V-loc suture was then used to Ml the small bowel to the anterior abdominal wall in a running fashion. The tube was flushed copiously to ensure luminal placement and drain stitch was placed.    At this time the case was complete. All 12 mm trocar sites were closed with the suture passer and the gas was evacuated. All incisions were closed with monocryl and dermabond.    All needle, lap, and sponge counts were correct x2. The patient tolerated the procedure well.    Portions of the record were created with [employer] Direct voice recognition software. This may lead to occasional typographical errors due to the inherent limitations of the software. Read the chart carefully and recognize, using context, where substitutions have occurred. Please do not hesitate to contact me directly if clarification is needed.      Implants: * No implants in log *    Drains:  19 Fr JOANN drain - neck   24 Fr. JOANN drain - right chest   12 Fr. Jejunostomy tube    Estimated Blood Loss (EBL):   125 mL    Specimens:   Specimen (24h ago, onward)       Start     Ordered    03/14/23 1720  Specimen to Pathology, Surgery General Surgery (Surgical Oncology)  Once        Comments: Pre-op Diagnosis: Esophageal adenocarcinoma [C15.9]Procedure(s):XI ROBOTIC ESOPHAGECTOMYROBOTIC JEJUNOSTOMY TUBE INSERTION Number of specimens: 3Name of specimens: 1.) Level 7 Lymph Nodes, Permanent.2.) Total Thoracic Esophagus, Proximal Stomach, Mediastinal and Abdominal Lymph Nodes, Frozen.3.) Residual Conduit, Permanent.     References:    Click here for ordering Quick Tip   Question Answer Comment   Procedure Type: General Surgery Surgical Oncology   Specimen  Class: Known or suspected malignancy    Which provider would you like to cc? SANTANA BROWN JR        03/14/23 1720                            Condition: Stable    Disposition: ICU - extubated and stable.    Attestation: I was present and scrubbed for the entire procedure including all described portions above.             Santana Brown Jr, MD      Surgical Oncology      3/14/2023, 8:09 PM

## 2023-03-15 NOTE — NURSING TRANSFER
Nursing Transfer Note      3/15/2023     Reason patient is being transferred: step down    Transfer To: [facility] 1060    Transfer via bed    Transfer with Cardiac monitor & O2    Transported by RN & PCT    Medicines sent: Insulin pen    Any special needs or follow-up needed: PT/OT    Chart send with patient: Yes    Notified: spouse        Upon arrival to floor: patient oriented to room, call bell in reach, and bed in lowest position     RN Tri met at bedside

## 2023-03-15 NOTE — PLAN OF CARE
Problem: Adult Inpatient Plan of Care  Goal: Plan of Care Review  Outcome: Ongoing, Progressing  Goal: Patient-Specific Goal (Individualized)  Outcome: Ongoing, Progressing  Goal: Absence of Hospital-Acquired Illness or Injury  Outcome: Ongoing, Progressing  Goal: Optimal Comfort and Wellbeing  Outcome: Ongoing, Progressing     Problem: Diabetes Comorbidity  Goal: Blood Glucose Level Within Targeted Range  Outcome: Ongoing, Progressing     Problem: Infection  Goal: Absence of Infection Signs and Symptoms  Outcome: Ongoing, Progressing     Problem: Fall Injury Risk  Goal: Absence of Fall and Fall-Related Injury  Outcome: Ongoing, Progressing     Problem: Skin Injury Risk Increased  Goal: Skin Health and Integrity  Outcome: Ongoing, Progressing

## 2023-03-15 NOTE — PLAN OF CARE
Problem: Physical Therapy  Goal: Physical Therapy Goal  Description: Goals to be met by: 3/25/23     Patient will increase functional independence with mobility by performin. Supine to sit with Stand-by Assistance  2. Sit to stand transfer with Supervision  3. Gait  x 250 feet with Supervision   4. Ascend/descend 5 stair with right Handrails Stand-by Assistance      Outcome: Ongoing, Progressing   Evaluation completed and goals appropriate. 3/15/2023

## 2023-03-15 NOTE — PT/OT/SLP EVAL
Physical Therapy  Co-Evaluation and treatment with OT    Patient Name:  Lukasz Person   MRN:  [id number]    Recommendations:     Discharge Recommendations: home   Discharge Equipment Recommendations: none   Barriers to discharge: None    Assessment:     Lukasz Person is a 67 y.o. male admitted with a medical diagnosis of Esophageal adenocarcinoma.  He presents with the following impairments/functional limitations: impaired endurance, impaired functional mobility, gait instability, impaired balance, decreased safety awareness pt tolerated treatment well and will benefit from skilled PT 3x/wk to progress physically. Pt will be able to discharge home with no needs when medically stable. Pt is s/p robotic esophagectomy and PEG placement 3/14/23.    Rehab Prognosis: Good; patient would benefit from acute skilled PT services to address these deficits and reach maximum level of function.    Recent Surgery: Procedure(s) (LRB):  XI ROBOTIC ESOPHAGECTOMY (N/A)  ROBOTIC JEJUNOSTOMY TUBE INSERTION (N/A) 1 Day Post-Op    Plan:     During this hospitalization, patient to be seen 3 x/week to address the identified rehab impairments via gait training, therapeutic activities and progress toward the following goals:    Plan of Care Expires:  04/13/23    Subjective     Chief Complaint: pt c/o dizziness with standing and pain in his throat   Patient/Family Comments/goals: to get better and have no pain.   Pain/Comfort:  Pain Rating 1: 4/10 (throat)  Pain Addressed 1: Distraction  Pain Rating Post-Intervention 1: 4/10 (throat)    Patients cultural, spiritual, Yarsani conflicts given the current situation: no    Living Environment:  Pt is retired and lives with his wife (works full-time) in 1 story with 5 steps and R handrail.   Prior to admission, patients level of function was independent .  Equipment used at home: cane, straight (walk in shower).  DME owned (not currently used): none.  Upon discharge, patient will have assistance from  wife who will take time off of work. .    Objective:     Communicated with nurse  prior to session.  Patient found supine with JOANN drain, PCA, telemetry, gongora catheter, chest tube, peripheral IV, oxygen , LYDIA hose upon PT entry to room.    General Precautions: Standard, fall  Orthopedic Precautions:    Braces:    Respiratory Status: Nasal cannula, flow 2 L/min    Exams:  Cognitive Exam:  Patient is oriented to Person, Place, Time, and Situation  RLE ROM: WFL  RLE Strength: WFL  LLE ROM: WFL  LLE Strength: WFL    Functional Mobility:  Bed Mobility:  pt needed verbal cues for hand placement and sequencing for functional mobility.    Rolling Right: moderate assistance  Supine to Sit: moderate assistance    Transfers:     Sit to Stand:  contact guard assistance with hand-held assist    Gait: pt received gait training ~ 16 ft with HHA x 2 and O2 intact.     PT concentrated on BLE MMT, ROM, standing balance and gait training with evaluation and treatment.     Pt white board updated with current therapists name and level of mobility assistance needed.         AM-PAC 6 CLICK MOBILITY  Total Score:17       Treatment & Education:  Pt and wife received verbal instructions in role of PT and POC. Both verbally expressed understanding of such.     Patient left up in chair  with all lines intact, call button in reach, RN  notified, and wife present.    GOALS:   Multidisciplinary Problems       Physical Therapy Goals          Problem: Physical Therapy    Goal Priority Disciplines Outcome Goal Variances Interventions   Physical Therapy Goal     PT, PT/OT Ongoing, Progressing     Description: Goals to be met by: 3/25/23     Patient will increase functional independence with mobility by performin. Supine to sit with Stand-by Assistance  2. Sit to stand transfer with Supervision  3. Gait  x 250 feet with Supervision   4. Ascend/descend 5 stair with right Handrails Stand-by Assistance                           History:     Past  Medical History:   Diagnosis Date    Anticoagulant long-term use     Diabetes mellitus     Onset late 50s/early 60s    Hyperlipidemia     Hypertension     Onset late 50s/early 60s    Kidney stones     Sleep apnea     since 2006       Past Surgical History:   Procedure Laterality Date    CORONARY ANGIOGRAPHY N/A 9/30/2020    Procedure: ANGIOGRAM, CORONARY ARTERY;  Surgeon: John West MD;  Location: [facility] CATH LAB;  Service: Cardiology;  Laterality: N/A;    CYSTOSCOPY N/A 2/17/2022    Procedure: CYSTOSCOPY;  Surgeon: Lukasz Hughes MD;  Location: [facility] OR [address] [place] [address];  Service: Urology;  Laterality: N/A;    CYSTOSCOPY W/ URETERAL STENT PLACEMENT N/A 2/25/2022    Procedure: CYSTOSCOPY, WITH URETERAL STENT INSERTION;  Surgeon: Lukasz Hughes MD;  Location: [address];  Service: Urology;  Laterality: N/A;    ENDOSCOPIC ULTRASOUND OF UPPER GASTROINTESTINAL TRACT N/A 12/7/2022    Procedure: ULTRASOUND, UPPER GI TRACT, ENDOSCOPIC;  Surgeon: Darian Main MD;  Location: [facility];  Service: Endoscopy;  Laterality: N/A;  Approval to hold Xarelto rec'd from Dr. Nick (see t/e 12/5/22)-DS    ESOPHAGOGASTRODUODENOSCOPY N/A 11/17/2022    Procedure: EGD (ESOPHAGOGASTRODUODENOSCOPY);  Surgeon: Brody Gonzales MD;  Location: [facility] ([address] [place] [address]);  Service: Endoscopy;  Laterality: N/A;  inst via email-ok to hold Xarelto x 2 days-MS    LASER LITHOTRIPSY Left 2/25/2022    Procedure: LITHOTRIPSY, USING LASER;  Surgeon: Lukasz Hughes MD;  Location: [address];  Service: Urology;  Laterality: Left;    LEFT HEART CATHETERIZATION Left 9/30/2020    Procedure: Left heart cath;  Surgeon: John West MD;  Location: [facility] CATH LAB;  Service: Cardiology;  Laterality: Left;    LITHOTRIPSY      PARATHYROIDECTOMY  1/1/2-107    PYELOSCOPY Left 2/25/2022    Procedure: PYELOSCOPY;  Surgeon: Lukasz Hughes MD;  Location: [address];  Service: Urology;  Laterality: Left;    ROBOT-ASSISTED SURGICAL REMOVAL  OF ESOPHAGUS USING DA CARLOS XI N/A 3/14/2023    Procedure: XI ROBOTIC ESOPHAGECTOMY;  Surgeon: Santana Brown Jr., MD;  Location: [facility] OR 2ND FLR;  Service: General;  Laterality: N/A;  Abdomen, Chest and Neck    ROBOTIC JEJUNOSTOMY N/A 3/14/2023    Procedure: ROBOTIC JEJUNOSTOMY TUBE INSERTION;  Surgeon: Santana Brown Jr., MD;  Location: [facility] OR 2ND FLR;  Service: General;  Laterality: N/A;    TRANSESOPHAGEAL ECHOCARDIOGRAPHY N/A 4/7/2022    Procedure: ECHOCARDIOGRAM, TRANSESOPHAGEAL;  Surgeon: [employer] Diagnostic Provider;  Location: [facility] EP LAB;  Service: Cardiology;  Laterality: N/A;    TREATMENT OF CARDIAC ARRHYTHMIA N/A 4/7/2022    Procedure: Cardioversion or Defibrillation;  Surgeon: Iris Fisher NP;  Location: [facility] EP LAB;  Service: Cardiology;  Laterality: N/A;  afib, dccv, artie, anes, EH, 3prep    URETEROSCOPIC REMOVAL OF URETERIC CALCULUS Left 2/25/2022    Procedure: REMOVAL, CALCULUS, URETER, URETEROSCOPIC;  Surgeon: Lukasz Hughes MD;  [facility];  Service: Urology;  Laterality: Left;    URETEROSCOPY Left 2/25/2022    Procedure: URETEROSCOPY;  Surgeon: Lukasz Hughes MD;  Location: [facility] OR [address] [place] [address];  Service: Urology;  Laterality: Left;       Time Tracking:     PT Received On: 03/15/23  PT Start Time: 1315     PT Stop Time: 1341  PT Total Time (min): 26 min     Billable Minutes: Evaluation 8 min   and Gait Training 18 min       03/15/2023

## 2023-03-15 NOTE — SUBJECTIVE & OBJECTIVE
Interval History: POD1 esophagectomy. Reports some hoarseness. Overall feels well, reporting coughing some phlegm. On CPAP nightly at home, did not use CPAP last night. On 4L NC. CT 430cc ss output on suction. Neck JOANN drain with 30cc ss output. Abdominal pain minimal to none.    Medications:  Continuous Infusions:   hydromorphone in 0.9 % NaCl 6 mg/30 ml      lactated ringers 50 mL/hr at 03/15/23 0603     Scheduled Meds:   enoxaparin  40 mg Subcutaneous Daily    gabapentin  125 mg Oral Q8H    ketorolac  15 mg Intravenous Q6H    levalbuterol  0.63 mg Nebulization Q6H WAKE    methocarbamol (ROBAXIN) IVPB  500 mg Intravenous Q8H    metoprolol  5 mg Intravenous Q6H    pantoprazole  40 mg Intravenous Daily    tamsulosin  0.4 mg Oral Daily     PRN Meds:calcium gluconate IVPB, calcium gluconate IVPB, calcium gluconate IVPB, glucagon (human recombinant), insulin aspart U-100, magnesium sulfate IVPB, magnesium sulfate IVPB, naloxone, ondansetron, potassium chloride **AND** potassium chloride **AND** potassium chloride, sodium chloride 0.9%, sodium phosphate IVPB, sodium phosphate IVPB, sodium phosphate IVPB     Review of patient's allergies indicates:  No Known Allergies  Objective:     Vital Signs (Most Recent):  Temp: 97.9 °F (36.6 °C) (03/15/23 0301)  Pulse: (!) 53 (03/15/23 0500)  Resp: 11 (03/15/23 0500)  BP: (!) 145/61 (03/15/23 0500)  SpO2: 98 % (03/15/23 0500)   Vital Signs (24h Range):  Temp:  [97.7 °F (36.5 °C)-98.2 °F (36.8 °C)] 97.9 °F (36.6 °C)  Pulse:  [51-60] 53  Resp:  [10-30] 11  SpO2:  [92 %-99 %] 98 %  BP: (140-170)/(57-78) 145/61  Arterial Line BP: (141-172)/(57-74) 147/60     Weight: 111.6 kg (246 lb)  Body mass index is 33.36 kg/m².    Intake/Output - Last 3 Shifts         03/13 0700  03/14 0659 03/14 0700  03/15 0659    I.V. (mL/kg)  1044.5 (9.4)    IV Piggyback  3995.6    Total Intake(mL/kg)  5040.1 (45.2)    Urine (mL/kg/hr)  1135 (0.4)    Drains  30    Blood  125    Chest Tube  430    Total Output   1720    Net  +3320.1                  Physical Exam  Constitutional:       General: He is not in acute distress.  Neck:      Comments: Left lateral neck drain with ss output  Cardiovascular:      Rate and Rhythm: Normal rate and regular rhythm.   Pulmonary:      Effort: Pulmonary effort is normal. No respiratory distress.      Breath sounds: Normal breath sounds.      Comments: R sided CT to suction with ss output, no air leaks   Abdominal:      General: Abdomen is flat. Bowel sounds are normal. There is no distension.      Palpations: Abdomen is soft.      Tenderness: There is abdominal tenderness (appropriate TTP, non peritonitic). There is no guarding.      Comments: J tube in LUQ  Port incisions c/d/i       Significant Labs:  I have reviewed all pertinent lab results within the past 24 hours.  CBC:   Recent Labs   Lab 03/15/23  0346   WBC 7.43   RBC 3.58*   HGB 11.0*   HCT 34.3*   *   MCV 96   MCH 30.7   MCHC 32.1     BMP:   Recent Labs   Lab 03/15/23  0346   *      K 4.6      CO2 24   BUN 21   CREATININE 1.0   CALCIUM 8.3*   MG 2.0       Significant Diagnostics:  I have reviewed all pertinent imaging results/findings within the past 24 hours.

## 2023-03-15 NOTE — PLAN OF CARE
Problem: Occupational Therapy  Goal: Occupational Therapy Goal  Description: Goals to be met by: 3/29/23     Patient will increase functional independence with ADLs by performing:    Feeding with Supervision.  UE Dressing with Supervision.  LE Dressing with Supervision.  Grooming while standing with Supervision.  Toileting from toilet with Supervision for hygiene and clothing management.     Outcome: Ongoing, Progressing

## 2023-03-15 NOTE — SUBJECTIVE & OBJECTIVE
Interval History/Significant Events: NAEON. HDS   Right CT -/300/130.   Neck JOANN -/21/9.     Follow-up For: Procedure(s) (LRB):  XI ROBOTIC ESOPHAGECTOMY (N/A)  ROBOTIC JEJUNOSTOMY TUBE INSERTION (N/A)    Post-Operative Day: 1 Day Post-Op    Objective:     Vital Signs (Most Recent):  Temp: 97.9 °F (36.6 °C) (03/15/23 0301)  Pulse: (!) 53 (03/15/23 0500)  Resp: 11 (03/15/23 0500)  BP: (!) 145/61 (03/15/23 0500)  SpO2: 98 % (03/15/23 0500)   Vital Signs (24h Range):  Temp:  [97.7 °F (36.5 °C)-98.6 °F (37 °C)] 97.9 °F (36.6 °C)  Pulse:  [51-65] 53  Resp:  [10-30] 11  SpO2:  [92 %-99 %] 98 %  BP: (136-170)/(57-78) 145/61  Arterial Line BP: (141-172)/(57-74) 147/60     Weight: 111.6 kg (246 lb)  Body mass index is 33.36 kg/m².      Intake/Output Summary (Last 24 hours) at 3/15/2023 0545  Last data filed at 3/15/2023 0500  Gross per 24 hour   Intake 5040.07 ml   Output 1720 ml   Net 3320.07 ml       Physical Exam  Constitutional:       Appearance: Normal appearance.   HENT:      Head: Normocephalic and atraumatic.      Mouth/Throat:      Mouth: Mucous membranes are moist.      Pharynx: Oropharynx is clear.   Neck:      Comments: Site of surgical incision c/d/i. JOANN drain in place w/ minimal serosanguinous fluid.   Cardiovascular:      Rate and Rhythm: Normal rate and regular rhythm.      Pulses: Normal pulses.      Heart sounds: Normal heart sounds.   Pulmonary:      Effort: Pulmonary effort is normal.      Breath sounds: Normal breath sounds.      Comments: R sided CT in place, LIWS. ~200 cc of red tinged output.   Abdominal:      General: Abdomen is flat.      Palpations: Abdomen is soft.      Comments: surgical robotic incisions, all clean.    Musculoskeletal:         General: Normal range of motion.      Cervical back: Normal range of motion.   Skin:     General: Skin is warm and dry.   Neurological:      General: No focal deficit present.      Mental Status: He is alert and oriented to person, place, and time. Mental  status is at baseline.   Psychiatric:         Mood and Affect: Mood normal.         Behavior: Behavior normal.       Vents:       Lines/Drains/Airways       Central Venous Catheter Line  Duration                  PowerPort A Cath Single Lumen 12/22/22 0912 right internal jugular 82 days              Drain  Duration                  Chest Tube 03/14/23 1021 Tube - 1 Right Midaxillary 24 Fr. <1 day         Closed/Suction Drain 03/14/23 1607 Left Neck Bulb 15 Fr. <1 day         Gastrostomy/Enterostomy 03/14/23 1658 Jejunostomy tube LUQ <1 day         Urethral Catheter 03/14/23 0726 Non-latex;Silicone;Straight-tip 16 Fr. <1 day              Arterial Line  Duration             Arterial Line 03/14/23 0953 Left Radial <1 day              Peripheral Intravenous Line  Duration                  Peripheral IV - Single Lumen 03/14/23 0540 20 G Left;Posterior Hand 1 day                    Significant Labs:    CBC/Anemia Profile:  Recent Labs   Lab 03/14/23  1748 03/14/23  2041 03/15/23  0346   WBC 6.11  --  7.43   HGB 10.6*  --  11.0*   HCT 33.5* 32* 34.3*   *  --  130*   MCV 96  --  96   RDW 17.2*  --  17.4*        Chemistries:  Recent Labs   Lab 03/14/23  1748 03/15/23  0346    138   K 4.8 4.6    104   CO2 23 24   BUN 18 21   CREATININE 1.1 1.0   CALCIUM 7.6* 8.3*   ALBUMIN 3.3* 3.4*   PROT 5.8* 6.2   BILITOT 0.5 0.5   ALKPHOS 62 67   * 254*   * 264*   MG 1.6 2.0   PHOS 4.1 3.8       All pertinent labs within the past 24 hours have been reviewed.    Significant Imaging:  I have reviewed all pertinent imaging results/findings within the past 24 hours.

## 2023-03-15 NOTE — ASSESSMENT & PLAN NOTE
Lukasz Person is a 67 y.o. with PMHx of HTN, T2DM, KUMAR on CPAP, CAD, CKD3, A fib, HFrEF s/p XI ROBOTIC ESOPHAGECTOMY (N/A), ROBOTIC JEJUNOSTOMY TUBE INSERTION (N/A) 1 Day Post-Op    - NPO  - decrease mIVF to 50/hr  - d/c A line  - ENT consulted for hoarseness and possible medialization of nerve  - pending AM CXR  - continue PCA  - stepdown from SICU  - PRN nausea meds  - Replace electrolytes PRN  - DVT prophylaxis   - OOBTC/Ambulate  - aggressive pulmonary toilet  - IS  - PT/OT    Dispo: stable for stepdown to [facility]

## 2023-03-16 ENCOUNTER — ANESTHESIA EVENT (OUTPATIENT)
Dept: SURGERY | Facility: HOSPITAL | Age: 68
DRG: 327 | End: 2023-03-16
Payer: MEDICARE

## 2023-03-16 PROBLEM — J38.3: Status: ACTIVE | Noted: 2023-03-16

## 2023-03-16 LAB
POCT GLUCOSE: 138 MG/DL (ref 70–110)
POCT GLUCOSE: 151 MG/DL (ref 70–110)
POCT GLUCOSE: 182 MG/DL (ref 70–110)

## 2023-03-16 PROCEDURE — 63600175 PHARM REV CODE 636 W HCPCS

## 2023-03-16 PROCEDURE — C9113 INJ PANTOPRAZOLE SODIUM, VIA: HCPCS | Performed by: STUDENT IN AN ORGANIZED HEALTH CARE EDUCATION/TRAINING PROGRAM

## 2023-03-16 PROCEDURE — 25000003 PHARM REV CODE 250: Performed by: STUDENT IN AN ORGANIZED HEALTH CARE EDUCATION/TRAINING PROGRAM

## 2023-03-16 PROCEDURE — 25000003 PHARM REV CODE 250

## 2023-03-16 PROCEDURE — 20600001 HC STEP DOWN PRIVATE ROOM

## 2023-03-16 PROCEDURE — 94799 UNLISTED PULMONARY SVC/PX: CPT

## 2023-03-16 PROCEDURE — 27000221 HC OXYGEN, UP TO 24 HOURS

## 2023-03-16 PROCEDURE — 94761 N-INVAS EAR/PLS OXIMETRY MLT: CPT

## 2023-03-16 PROCEDURE — 99900035 HC TECH TIME PER 15 MIN (STAT)

## 2023-03-16 PROCEDURE — 25000242 PHARM REV CODE 250 ALT 637 W/ HCPCS

## 2023-03-16 PROCEDURE — 63600175 PHARM REV CODE 636 W HCPCS: Performed by: STUDENT IN AN ORGANIZED HEALTH CARE EDUCATION/TRAINING PROGRAM

## 2023-03-16 PROCEDURE — 94640 AIRWAY INHALATION TREATMENT: CPT

## 2023-03-16 PROCEDURE — 25000242 PHARM REV CODE 250 ALT 637 W/ HCPCS: Performed by: STUDENT IN AN ORGANIZED HEALTH CARE EDUCATION/TRAINING PROGRAM

## 2023-03-16 PROCEDURE — 94664 DEMO&/EVAL PT USE INHALER: CPT

## 2023-03-16 RX ORDER — SENNOSIDES 8.8 MG/5ML
5 LIQUID ORAL NIGHTLY
Status: DISCONTINUED | OUTPATIENT
Start: 2023-03-16 | End: 2023-03-20 | Stop reason: HOSPADM

## 2023-03-16 RX ORDER — OXYCODONE HCL 5 MG/5 ML
5 SOLUTION, ORAL ORAL EVERY 4 HOURS PRN
Status: DISCONTINUED | OUTPATIENT
Start: 2023-03-16 | End: 2023-03-20 | Stop reason: HOSPADM

## 2023-03-16 RX ORDER — OXYCODONE HCL 5 MG/5 ML
10 SOLUTION, ORAL ORAL EVERY 4 HOURS PRN
Status: DISCONTINUED | OUTPATIENT
Start: 2023-03-16 | End: 2023-03-20 | Stop reason: HOSPADM

## 2023-03-16 RX ADMIN — PANTOPRAZOLE SODIUM 40 MG: 40 INJECTION, POWDER, FOR SOLUTION INTRAVENOUS at 08:03

## 2023-03-16 RX ADMIN — METHOCARBAMOL 500 MG: 100 INJECTION, SOLUTION INTRAMUSCULAR; INTRAVENOUS at 09:03

## 2023-03-16 RX ADMIN — METOROPROLOL TARTRATE 5 MG: 5 INJECTION, SOLUTION INTRAVENOUS at 11:03

## 2023-03-16 RX ADMIN — ENOXAPARIN SODIUM 40 MG: 40 INJECTION SUBCUTANEOUS at 05:03

## 2023-03-16 RX ADMIN — LEVALBUTEROL HYDROCHLORIDE 0.63 MG: 0.63 SOLUTION RESPIRATORY (INHALATION) at 08:03

## 2023-03-16 RX ADMIN — ACETAMINOPHEN 499.51 MG: 160 SOLUTION ORAL at 09:03

## 2023-03-16 RX ADMIN — KETOROLAC TROMETHAMINE 15 MG: 15 INJECTION, SOLUTION INTRAMUSCULAR; INTRAVENOUS at 06:03

## 2023-03-16 RX ADMIN — METOPROLOL TARTRATE 12.5 MG: 25 TABLET, FILM COATED ORAL at 05:03

## 2023-03-16 RX ADMIN — TAMSULOSIN HYDROCHLORIDE 0.4 MG: 0.4 CAPSULE ORAL at 08:03

## 2023-03-16 RX ADMIN — METOPROLOL TARTRATE 12.5 MG: 25 TABLET, FILM COATED ORAL at 08:03

## 2023-03-16 RX ADMIN — OXYCODONE HYDROCHLORIDE 10 MG: 5 SOLUTION ORAL at 08:03

## 2023-03-16 RX ADMIN — ACETAMINOPHEN 499.51 MG: 160 SOLUTION ORAL at 01:03

## 2023-03-16 RX ADMIN — METOROPROLOL TARTRATE 5 MG: 5 INJECTION, SOLUTION INTRAVENOUS at 06:03

## 2023-03-16 RX ADMIN — ACETAMINOPHEN 499.51 MG: 160 SOLUTION ORAL at 06:03

## 2023-03-16 RX ADMIN — KETOROLAC TROMETHAMINE 15 MG: 15 INJECTION, SOLUTION INTRAMUSCULAR; INTRAVENOUS at 11:03

## 2023-03-16 RX ADMIN — OXYCODONE HYDROCHLORIDE 10 MG: 5 SOLUTION ORAL at 09:03

## 2023-03-16 RX ADMIN — LEVALBUTEROL HYDROCHLORIDE 0.63 MG: 0.63 SOLUTION RESPIRATORY (INHALATION) at 01:03

## 2023-03-16 RX ADMIN — KETOROLAC TROMETHAMINE 15 MG: 15 INJECTION, SOLUTION INTRAMUSCULAR; INTRAVENOUS at 05:03

## 2023-03-16 RX ADMIN — METHOCARBAMOL 500 MG: 100 INJECTION, SOLUTION INTRAMUSCULAR; INTRAVENOUS at 01:03

## 2023-03-16 RX ADMIN — OXYCODONE HYDROCHLORIDE 10 MG: 5 SOLUTION ORAL at 03:03

## 2023-03-16 RX ADMIN — SENNOSIDES 5 ML: 8.8 SYRUP ORAL at 08:03

## 2023-03-16 NOTE — SUBJECTIVE & OBJECTIVE
Interval History: NAEON. Still reporting some hoarseness. Overall feels well, reporting coughing some phlegm. CT 450cc ss output, on water seal. Neck JOANN drain with 70cc ss output. Abdominal pain minimal to none.       Medications:  Continuous Infusions:  Scheduled Meds:   acetaminophen  499.5074 mg Per J Tube Q8H    allopurinoL  100 mg Per J Tube Daily    enoxaparin  40 mg Subcutaneous Daily    ketorolac  15 mg Intravenous Q6H    levalbuterol  0.63 mg Nebulization Q6H WAKE    methocarbamol (ROBAXIN) IVPB  500 mg Intravenous Q8H    metoprolol  5 mg Intravenous Q6H    pantoprazole  40 mg Intravenous Daily    sennosides 8.8 mg/5 ml  5 mL Per J Tube QHS    tamsulosin  0.4 mg Oral Daily     PRN Meds:glucagon (human recombinant), insulin aspart U-100, ondansetron, oxyCODONE, oxyCODONE, sodium chloride 0.9%     Review of patient's allergies indicates:  No Known Allergies  Objective:     Vital Signs (Most Recent):  Temp: 98.9 °F (37.2 °C) (03/16/23 0816)  Pulse: 75 (03/16/23 0822)  Resp: 18 (03/16/23 0906)  BP: 130/62 (03/16/23 0816)  SpO2: 96 % (03/16/23 0822) Vital Signs (24h Range):  Temp:  [96.6 °F (35.9 °C)-98.9 °F (37.2 °C)] 98.9 °F (37.2 °C)  Pulse:  [57-84] 75  Resp:  [16-20] 18  SpO2:  [90 %-99 %] 96 %  BP: (105-148)/(55-67) 130/62     Weight: 111.6 kg (246 lb)  Body mass index is 33.36 kg/m².    Intake/Output - Last 3 Shifts         03/14 0700  03/15 0659 03/15 0700  03/16 0659 03/16 0700 03/17 0659    I.V. (mL/kg) 1158.6 (10.4) 1104.3 (9.9)     NG/GT  60     IV Piggyback 3995.6 97.5     Total Intake(mL/kg) 5154.1 (46.2) 1261.7 (11.3)     Urine (mL/kg/hr) 1195 (0.4) 985 (0.4) 300 (0.8)    Drains 30 70     Blood 125      Chest Tube 440 450 100    Total Output 1790 1505 400    Net +3364.1 -243.3 -400                   Physical Exam  Constitutional:       General: He is not in acute distress.  Neck:      Comments: Left lateral neck drain with ss output  HEENT:     Hoarse, weak cough  Cardiovascular:      Rate and  Rhythm: Normal rate and regular rhythm.   Pulmonary:      Effort: Pulmonary effort is normal. No respiratory distress.      Breath sounds: Normal breath sounds.      Comments: R sided CT to suction with ss output, no air leaks   Abdominal:      General: Abdomen is flat. Bowel sounds are normal. There is no distension.      Palpations: Abdomen is soft.      Tenderness: There is abdominal tenderness (appropriate TTP, non peritonitic). There is no guarding.      Comments: J tube in LUQ  Port incisions c/d/i        Significant Labs:  I have reviewed all pertinent lab results within the past 24 hours.  CBC:   Recent Labs   Lab 03/15/23  0346   WBC 7.43   RBC 3.58*   HGB 11.0*   HCT 34.3*   *   MCV 96   MCH 30.7   MCHC 32.1     BMP:   Recent Labs   Lab 03/15/23  0346   *      K 4.6      CO2 24   BUN 21   CREATININE 1.0   CALCIUM 8.3*   MG 2.0       Significant Diagnostics:  I have reviewed all pertinent imaging results/findings within the past 24 hours.

## 2023-03-16 NOTE — CONSULTS
[facility]  Otorhinolaryngology-Head & Neck Surgery  Consult Note    Patient Name: Lukasz Person  MRN: [id number]  Code Status: Full Code  Admission Date: 3/14/2023  Hospital Length of Stay: 2 days  Attending Physician: Santana Brown Jr., MD  Primary Care Provider: Kirk Wilson MD    Patient information was obtained from patient, spouse/SO and past medical records.     Inpatient consult to ENT  Consult performed by: Yosvany Tang MD  Consult ordered by: Jessica Francois MD        Subjective:     Chief Complaint/Reason for Admission: dysphonia    History of Present Illness: Mr. Person is a 67 year-old male with T3N0M0 adenocarcinoma of the distal esophagus s/p robotic esophagectomy with a left transcervical approach on 3/14. He was extubated postoperatively without any complications. He has persistent dysphonia and weak cough. ENT consulted for further evaluation.     Mr. Person states his voice was fine postoperatively but progressively became hoarse. It worsens with use. He does not have airway distress or trouble breathing. He will remain NPO for 2 weeks and undergo esophagram  at that time. His anticipated discharge date is this Sunday.       Medications:  Continuous Infusions:  Scheduled Meds:   acetaminophen  499.5074 mg Per J Tube Q8H    allopurinoL  100 mg Per J Tube Daily    enoxaparin  40 mg Subcutaneous Daily    ketorolac  15 mg Intravenous Q6H    levalbuterol  0.63 mg Nebulization Q6H WAKE    methocarbamol (ROBAXIN) IVPB  500 mg Intravenous Q8H    metoprolol  12.5 mg Per J Tube QID    pantoprazole  40 mg Intravenous Daily    sennosides 8.8 mg/5 ml  5 mL Per J Tube QHS    tamsulosin  0.4 mg Oral Daily     PRN Meds:glucagon (human recombinant), insulin aspart U-100, ondansetron, oxyCODONE, oxyCODONE, sodium chloride 0.9%     No current facility-administered medications on file prior to encounter.     Current Outpatient Medications on File Prior to Encounter   Medication Sig    ACCU-CHEK SOFTCLIX  LANCETS Misc USE EVERY DAY    allopurinoL (ZYLOPRIM) 100 MG tablet TAKE 1 TABLET BY MOUTH EVERY DAY    blood-glucose meter kit Checks blood sugars 1x/daily.    lisinopriL-hydrochlorothiazide (PRINZIDE,ZESTORETIC) 20-25 mg Tab TAKE 1 TABLET BY MOUTH EVERY DAY    metoprolol succinate (TOPROL-XL) 25 MG 24 hr tablet Take 1 tablet (25 mg total) by mouth once daily.    omeprazole (PRILOSEC) 40 MG capsule Take 1 capsule (40 mg total) by mouth once daily.    rosuvastatin (CRESTOR) 20 MG tablet TAKE 1 TABLET(20 MG) BY MOUTH EVERY DAY (Patient taking differently: Take 20 mg by mouth every evening.)    tamsulosin (FLOMAX) 0.4 mg Cap Take 1 capsule (0.4 mg total) by mouth once daily. (Patient taking differently: Take 0.4 mg by mouth nightly.)    testosterone (ANDROGEL) 20.25 mg/1.25 gram (1.62 %) GlPm Apply 4 pumps to shoulders daily    glimepiride (AMARYL) 2 MG tablet TAKE 2 TABLETS BY MOUTH EVERY MORNING    nitroGLYCERIN (NITROSTAT) 0.4 MG SL tablet Place 1 tablet (0.4 mg total) under the tongue every 5 (five) minutes as needed for Chest pain. If you need a third tablet, call 911    rivaroxaban (XARELTO) 20 mg Tab Take 1 tablet (20 mg total) by mouth daily with dinner or evening meal.     Review of patient's allergies indicates:  No Known Allergies    Past Medical History:   Diagnosis Date    Anticoagulant long-term use     Diabetes mellitus     Onset late 50s/early 60s    Hyperlipidemia     Hypertension     Onset late 50s/early 60s    Kidney stones     Sleep apnea     since 2006     Past Surgical History:   Procedure Laterality Date    CORONARY ANGIOGRAPHY N/A 9/30/2020    Procedure: ANGIOGRAM, CORONARY ARTERY;  Surgeon: John West MD;  Location: [facility] CATH LAB;  Service: Cardiology;  Laterality: N/A;    CYSTOSCOPY N/A 2/17/2022    Procedure: CYSTOSCOPY;  Surgeon: Lukasz Hughes MD;  Location: [facility] OR [address] [place] [address];  Service: Urology;  Laterality: N/A;    CYSTOSCOPY W/ URETERAL STENT PLACEMENT N/A  2/25/2022    Procedure: CYSTOSCOPY, WITH URETERAL STENT INSERTION;  Surgeon: Lukasz Hughes MD;  Location: [facility] OR 1ST FLR;  Service: Urology;  Laterality: N/A;    ENDOSCOPIC ULTRASOUND OF UPPER GASTROINTESTINAL TRACT N/A 12/7/2022    Procedure: ULTRASOUND, UPPER GI TRACT, ENDOSCOPIC;  Surgeon: Darian Main MD;  Location: [facility] ENDO;  Service: Endoscopy;  Laterality: N/A;  Approval to hold Xarelto rec'd from Dr. Nick (see t/e 12/5/22)-DS    ESOPHAGOGASTRODUODENOSCOPY N/A 11/17/2022    Procedure: EGD (ESOPHAGOGASTRODUODENOSCOPY);  Surgeon: Brody Gonzales MD;  Location: [facility] ENDO (2ND FLR);  Service: Endoscopy;  Laterality: N/A;  inst via email-ok to hold Xarelto x 2 days-MS    LASER LITHOTRIPSY Left 2/25/2022    Procedure: LITHOTRIPSY, USING LASER;  Surgeon: Lukasz Hughes MD;  Location: [facility] OR 1ST FLR;  Service: Urology;  Laterality: Left;    LEFT HEART CATHETERIZATION Left 9/30/2020    Procedure: Left heart cath;  Surgeon: John West MD;  Location: [facility] CATH LAB;  Service: Cardiology;  Laterality: Left;    LITHOTRIPSY      PARATHYROIDECTOMY  1/1/2-107    PYELOSCOPY Left 2/25/2022    Procedure: PYELOSCOPY;  Surgeon: Lukasz Hughes MD;  Location: [facility] OR 1ST FLR;  Service: Urology;  Laterality: Left;    ROBOT-ASSISTED SURGICAL REMOVAL OF ESOPHAGUS USING DA CARLOS XI N/A 3/14/2023    Procedure: XI ROBOTIC ESOPHAGECTOMY;  Surgeon: Santana Brown Jr., MD;  Location: [facility] OR 2ND FLR;  Service: General;  Laterality: N/A;  Abdomen, Chest and Neck    ROBOTIC JEJUNOSTOMY N/A 3/14/2023    Procedure: ROBOTIC JEJUNOSTOMY TUBE INSERTION;  Surgeon: Santana Brown Jr., MD;  Location: [facility] OR 2ND FLR;  Service: General;  Laterality: N/A;    TRANSESOPHAGEAL ECHOCARDIOGRAPHY N/A 4/7/2022    Procedure: ECHOCARDIOGRAM, TRANSESOPHAGEAL;  Surgeon: [facility] Diagnostic Provider;  Location: [facility] EP LAB;  Service: Cardiology;  Laterality: N/A;    TREATMENT OF CARDIAC ARRHYTHMIA N/A 4/7/2022     Procedure: Cardioversion or Defibrillation;  Surgeon: Iris Fisher NP;  Location: [facility] EP LAB;  Service: Cardiology;  Laterality: N/A;  afib, dccv, artie, anes, EH, 3prep    URETEROSCOPIC REMOVAL OF URETERIC CALCULUS Left 2022    Procedure: REMOVAL, CALCULUS, URETER, URETEROSCOPIC;  Surgeon: Lukasz Hughes MD;  Location: [facility] OR [facility] FLR;  Service: Urology;  Laterality: Left;    URETEROSCOPY Left 2022    Procedure: URETEROSCOPY;  Surgeon: Lukasz Hughes MD;  [facility];  Service: Urology;  Laterality: Left;     Family History       Problem Relation (Age of Onset)    Alcohol abuse Paternal Aunt    Arthritis Mother, Father, Sister, Sister, Brother    Cancer Maternal Grandfather, Paternal Grandfather    Colon cancer Brother (32), Paternal Grandfather    Colon polyps Paternal Grandfather    Diabetes Father, Paternal Grandmother    Heart disease Father (70)    Hypertension Sister    Kidney disease Father          Tobacco Use    Smoking status: Former     Packs/day: 1.00     Years: 7.00     Pack years: 7.00     Types: Cigarettes, Cigars     Start date: 1972     Quit date:      Years since quittin.8     Passive exposure: Never    Smokeless tobacco: Never    Tobacco comments:     smoking was off and on.  cumulative 7 years.  never more than three years in one stretch or more lj   Substance and Sexual Activity    Alcohol use: Yes     Alcohol/week: 3.0 standard drinks     Types: 2 Cans of beer, 1 Shots of liquor per week     Comment: don't drink regularly.  6 to 7 monthly    Drug use: Not Currently     Types: Marijuana    Sexual activity: Not Currently     Partners: Female     Birth control/protection: None     Comment:      Review of Systems  Objective:     Vital Signs (Most Recent):  Temp: 99.5 °F (37.5 °C) (23 154)  Pulse: 73 (23 1548)  Resp: 18 (23 154)  BP: 108/64 (23 1712)  SpO2: (!) 91 % (23 154)   Vital Signs (24h  Range):  Temp:  [96.6 °F (35.9 °C)-99.5 °F (37.5 °C)] 99.5 °F (37.5 °C)  Pulse:  [62-84] 73  Resp:  [16-19] 18  SpO2:  [91 %-99 %] 91 %  BP: (108-148)/(55-65) 108/64     Weight: 111.6 kg (246 lb)  Body mass index is 33.36 kg/m².    Date 03/16/23 0700 - 03/17/23 0659   Shift 4141-0316 2794-4647 4870-0041 24 Hour Total   INTAKE   NG/ 250  450   Shift Total(mL/kg) 200(1.8) 250(2.2)  450(4)   OUTPUT   Urine(mL/kg/hr) 300(0.3) 200  500   Drains  5  5   Chest Tube 100 200  300   Shift Total(mL/kg) 400(3.6) 405(3.6)  805(7.2)   Weight (kg) 111.6 111.6 111.6 111.6     Physical Exam  Resting comfortably on recliner chair  Voice is dysphonic but strong, good articulation  Left transcervical incision c/d/i with nearby JOANN drain    Flexible Laryngoscopy     Nasal Cavity/Nasopharynx: Normal mucosa, inferior turbinates and septum. No evidence of septal deviation or perforation. There are no visible lesions. Bianca within normal limits.  Oropharynx: Pharyngeal wall without edema, lesions, or masses. Bilateral palatine tonsils within normal limits. Base of tongue symmetric without masses or lesions. Lingual tonsil within normal limits.  Hypopharynx: No lesions or masses noted within the piriform sinuses or post cricoid area. No pooling of secretions.  Supraglottis: There is no edema of the arytenoids, interarytenoid space or epiglottis. Epiglottis is crisp. There are no masses or lesions noted. False vocal folds without masses or lesions.  Glottis: True vocal folds without masses or lesions. Normal mobility of right true vocal fold. Left true vocal fold in paramedian position and a mild sulcus along the middle thirds.     Inspiration      Phonation      Significant Labs:  BMP:   Recent Labs   Lab 03/15/23  0346   *      CO2 24   BUN 21   CREATININE 1.0   CALCIUM 8.3*   MG 2.0     CBC:   Recent Labs   Lab 03/15/23  0346   WBC 7.43   RBC 3.58*   HGB 11.0*   HCT 34.3*   *   MCV 96   MCH 30.7   MCHC 32.1      Significant Diagnostics:  I have reviewed all pertinent imaging results/findings within the past 24 hours.      Assessment/Plan:     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  67 year-old male with T3N0M0 adenocarcinoma of the distal esophagus s/p robotic esophagectomy and a left transcervical approach on 3/14. ENT consulted for persistent dysphonia. Flexible laryngoscopy shows a left true vocal fold hypomobility. There is also a glottic gap with maximal phonation. Primary team requesting medialization prior to discharge.     -- To OR on Friday, 3/17 with Dr. Altman for direct laryngoscopy and injection augmentation of left true vocal fold   -- Risks and benefits discussed with patient and wife at bedside; consent signed; witnessed by SHABBIR RN  -- NPO at midnight; hold tubefeeding  -- Please Secure Chat me for any questions, page ENT on-call if unresponsive or urgent      VTE Risk Mitigation (From admission, onward)         Ordered     enoxaparin injection 40 mg  Daily         03/15/23 0542                Thank you for your consult. I will follow-up with patient. Please contact us if you have any additional questions.    Yosvany Tang MD  Otorhinolaryngology-Head & Neck Surgery  [facility]

## 2023-03-16 NOTE — PLAN OF CARE
Recommendations     1.) Recommend when medically feasible to initiate TF of Azimuth Standard 1.4 with the goal rate of 80ml/hr x18hrs (2p-8a) to provide 2016kcal, 89gPRO, and 1037ml FF (100% of EEN and 88% of EPN)- advance slowly.      2.) RD to monitor tolerance, skin, labs, wt.        Goals: to meet % of EEN/EPN by next RD f/u  Nutrition Goal Status: new  Communication of RD Recs:  (POC)

## 2023-03-16 NOTE — ASSESSMENT & PLAN NOTE
Lukasz Person is a 67 y.o. with PMHx of HTN, T2DM, KUMAR on CPAP, CAD, CKD3, A fib, HFrEF s/p XI ROBOTIC ESOPHAGECTOMY (N/A), ROBOTIC JEJUNOSTOMY TUBE INSERTION (N/A) 2 Days Post-Op    - NPO  - start tube feeds @ 10/hr Maricarmen Mckeon 1.4 + FWF 200cc Q6  - d/c mIVF  - ENT consulted for hoarseness and possible medialization of vocal cord  - d/c PCA  - pain meds via J tube  - beta blocker via J tube  - liquid senna via J tube  - d/c Shelton  - d/c chest tube  - stepdown from SICU  - PRN nausea meds  - Replace electrolytes PRN  - DVT prophylaxis   - OOBTC/Ambulate  - aggressive pulmonary toilet  - IS  - PT/OT    Dispo: SHABBIR

## 2023-03-16 NOTE — ANESTHESIA POSTPROCEDURE EVALUATION
Anesthesia Post Evaluation    Patient: Lukasz Person    Procedure(s) Performed: Procedure(s) (LRB):  XI ROBOTIC ESOPHAGECTOMY (N/A)  ROBOTIC JEJUNOSTOMY TUBE INSERTION (N/A)    Final Anesthesia Type: general      Patient location during evaluation: ICU  Patient participation: Yes- Able to Participate  Level of consciousness: awake and alert and oriented  Pain management: adequate  Airway patency: patent    PONV status at discharge: No PONV  Anesthetic complications: no      Cardiovascular status: blood pressure returned to baseline and hemodynamically stable  Respiratory status: unassisted  Hydration status: euvolemic  Follow-up not needed.          Vitals Value Taken Time   /62 03/16/23 0816   Temp 37.2 °C (98.9 °F) 03/16/23 0816   Pulse 73 03/16/23 1102   Resp 18 03/16/23 0906   SpO2 96 % 03/16/23 0822         No case tracking events are documented in the log.      Pain/Jeovanny Score: Pain Rating Prior to Med Admin: 4 (3/16/2023 11:45 AM)

## 2023-03-16 NOTE — NURSING
Pt VSS. Lying in bed upright 45 degrees. No SS of pain or distress. CT to right lateral. JOANN drain compressed. Jtube to LUQ. PIV infusing LR @ 50 ml/hr. PCA pump in use. SCDS in Place. Heels floated. Patient is turned with assistance every two hours. Wife at bedside. Call light in reach.

## 2023-03-16 NOTE — SUBJECTIVE & OBJECTIVE
Medications:  Continuous Infusions:  Scheduled Meds:   acetaminophen  499.5074 mg Per J Tube Q8H    allopurinoL  100 mg Per J Tube Daily    enoxaparin  40 mg Subcutaneous Daily    ketorolac  15 mg Intravenous Q6H    levalbuterol  0.63 mg Nebulization Q6H WAKE    methocarbamol (ROBAXIN) IVPB  500 mg Intravenous Q8H    metoprolol  12.5 mg Per J Tube QID    pantoprazole  40 mg Intravenous Daily    sennosides 8.8 mg/5 ml  5 mL Per J Tube QHS    tamsulosin  0.4 mg Oral Daily     PRN Meds:glucagon (human recombinant), insulin aspart U-100, ondansetron, oxyCODONE, oxyCODONE, sodium chloride 0.9%     No current facility-administered medications on file prior to encounter.     Current Outpatient Medications on File Prior to Encounter   Medication Sig    ACCU-CHEK SOFTCLIX LANCETS Misc USE EVERY DAY    allopurinoL (ZYLOPRIM) 100 MG tablet TAKE 1 TABLET BY MOUTH EVERY DAY    blood-glucose meter kit Checks blood sugars 1x/daily.    lisinopriL-hydrochlorothiazide (PRINZIDE,ZESTORETIC) 20-25 mg Tab TAKE 1 TABLET BY MOUTH EVERY DAY    metoprolol succinate (TOPROL-XL) 25 MG 24 hr tablet Take 1 tablet (25 mg total) by mouth once daily.    omeprazole (PRILOSEC) 40 MG capsule Take 1 capsule (40 mg total) by mouth once daily.    rosuvastatin (CRESTOR) 20 MG tablet TAKE 1 TABLET(20 MG) BY MOUTH EVERY DAY (Patient taking differently: Take 20 mg by mouth every evening.)    tamsulosin (FLOMAX) 0.4 mg Cap Take 1 capsule (0.4 mg total) by mouth once daily. (Patient taking differently: Take 0.4 mg by mouth nightly.)    testosterone (ANDROGEL) 20.25 mg/1.25 gram (1.62 %) GlPm Apply 4 pumps to shoulders daily    glimepiride (AMARYL) 2 MG tablet TAKE 2 TABLETS BY MOUTH EVERY MORNING    nitroGLYCERIN (NITROSTAT) 0.4 MG SL tablet Place 1 tablet (0.4 mg total) under the tongue every 5 (five) minutes as needed for Chest pain. If you need a third tablet, call 911    rivaroxaban (XARELTO) 20 mg Tab Take 1 tablet (20 mg total) by mouth daily with  dinner or evening meal.     Review of patient's allergies indicates:  No Known Allergies    Past Medical History:   Diagnosis Date    Anticoagulant long-term use     Diabetes mellitus     Onset late 50s/early 60s    Hyperlipidemia     Hypertension     Onset late 50s/early 60s    Kidney stones     Sleep apnea     since 2006     Past Surgical History:   Procedure Laterality Date    CORONARY ANGIOGRAPHY N/A 9/30/2020    Procedure: ANGIOGRAM, CORONARY ARTERY;  Surgeon: John West MD;  Location: [facility] CATH LAB;  Service: Cardiology;  Laterality: N/A;    CYSTOSCOPY N/A 2/17/2022    Procedure: CYSTOSCOPY;  Surgeon: Lukasz Hughes MD;  Location: [facility] OR [address] [place] [address];  Service: Urology;  Laterality: N/A;    CYSTOSCOPY W/ URETERAL STENT PLACEMENT N/A 2/25/2022    Procedure: CYSTOSCOPY, WITH URETERAL STENT INSERTION;  Surgeon: Lukasz Hughes MD;  Location: [facility] OR [address] [place] [address];  Service: Urology;  Laterality: N/A;    ENDOSCOPIC ULTRASOUND OF UPPER GASTROINTESTINAL TRACT N/A 12/7/2022    Procedure: ULTRASOUND, UPPER GI TRACT, ENDOSCOPIC;  Surgeon: Darian Main MD;  Location: [facility] ENDO;  Service: Endoscopy;  Laterality: N/A;  Approval to hold Xarelto rec'd from Dr. Nick (see t/e 12/5/22)-DS    ESOPHAGOGASTRODUODENOSCOPY N/A 11/17/2022    Procedure: EGD (ESOPHAGOGASTRODUODENOSCOPY);  Surgeon: Brody Gonzales MD;  Location: [facility] ([address] [place] [address]);  Service: Endoscopy;  Laterality: N/A;  inst via email-ok to hold Xarelto x 2 days-MS    LASER LITHOTRIPSY Left 2/25/2022    Procedure: LITHOTRIPSY, USING LASER;  Surgeon: Lukasz Hughes MD;  Location: [facility] OR [address] [place] [address];  Service: Urology;  Laterality: Left;    LEFT HEART CATHETERIZATION Left 9/30/2020    Procedure: Left heart cath;  Surgeon: John West MD;  Location: [facility] CATH LAB;  Service: Cardiology;  Laterality: Left;    LITHOTRIPSY      PARATHYROIDECTOMY  1/1/2-107    PYELOSCOPY Left 2/25/2022    Procedure: PYELOSCOPY;  Surgeon: Lukasz Hughes MD;   Location: [facility] OR 1ST FLR;  Service: Urology;  Laterality: Left;    ROBOT-ASSISTED SURGICAL REMOVAL OF ESOPHAGUS USING DA CARLOS XI N/A 3/14/2023    Procedure: XI ROBOTIC ESOPHAGECTOMY;  Surgeon: Santana Brown Jr., MD;  Location: [facility] OR 2ND FLR;  Service: General;  Laterality: N/A;  Abdomen, Chest and Neck    ROBOTIC JEJUNOSTOMY N/A 3/14/2023    Procedure: ROBOTIC JEJUNOSTOMY TUBE INSERTION;  Surgeon: Santana Brown Jr., MD;  Location: [facility] OR 2ND FLR;  Service: General;  Laterality: N/A;    TRANSESOPHAGEAL ECHOCARDIOGRAPHY N/A 2022    Procedure: ECHOCARDIOGRAM, TRANSESOPHAGEAL;  Surgeon: [facility] Diagnostic Provider;  Location: [facility] EP LAB;  Service: Cardiology;  Laterality: N/A;    TREATMENT OF CARDIAC ARRHYTHMIA N/A 2022    Procedure: Cardioversion or Defibrillation;  Surgeon: Iris Fisher NP;  Location: [facility] EP LAB;  Service: Cardiology;  Laterality: N/A;  afib, dccv, artie, anes, EH, 3prep    URETEROSCOPIC REMOVAL OF URETERIC CALCULUS Left 2022    Procedure: REMOVAL, CALCULUS, URETER, URETEROSCOPIC;  Surgeon: Lukasz Hughes MD;  [facility];  Service: Urology;  Laterality: Left;    URETEROSCOPY Left 2022    Procedure: URETEROSCOPY;  Surgeon: Lukasz Hughes MD;  [facility];  Service: Urology;  Laterality: Left;     Family History       Problem Relation (Age of Onset)    Alcohol abuse Paternal Aunt    Arthritis Mother, Father, Sister, Sister, Brother    Cancer Maternal Grandfather, Paternal Grandfather    Colon cancer Brother (32), Paternal Grandfather    Colon polyps Paternal Grandfather    Diabetes Father, Paternal Grandmother    Heart disease Father (70)    Hypertension Sister    Kidney disease Father          Tobacco Use    Smoking status: Former     Packs/day: 1.00     Years: 7.00     Pack years: 7.00     Types: Cigarettes, Cigars     Start date: 1972     Quit date:      Years since quittin.8     Passive exposure: Never    Smokeless  tobacco: Never    Tobacco comments:     smoking was off and on.  cumulative 7 years.  never more than three years in one stretch or more lj   Substance and Sexual Activity    Alcohol use: Yes     Alcohol/week: 3.0 standard drinks     Types: 2 Cans of beer, 1 Shots of liquor per week     Comment: don't drink regularly.  6 to 7 monthly    Drug use: Not Currently     Types: Marijuana    Sexual activity: Not Currently     Partners: Female     Birth control/protection: None     Comment:      Review of Systems  Objective:     Vital Signs (Most Recent):  Temp: 99.5 °F (37.5 °C) (03/16/23 1548)  Pulse: 73 (03/16/23 1548)  Resp: 18 (03/16/23 1548)  BP: 108/64 (03/16/23 1712)  SpO2: (!) 91 % (03/16/23 1548)   Vital Signs (24h Range):  Temp:  [96.6 °F (35.9 °C)-99.5 °F (37.5 °C)] 99.5 °F (37.5 °C)  Pulse:  [62-84] 73  Resp:  [16-19] 18  SpO2:  [91 %-99 %] 91 %  BP: (108-148)/(55-65) 108/64     Weight: 111.6 kg (246 lb)  Body mass index is 33.36 kg/m².    Date 03/16/23 0700 - 03/17/23 0659   Shift 0650-8158 1236-7578 5799-1592 24 Hour Total   INTAKE   NG/ 250  450   Shift Total(mL/kg) 200(1.8) 250(2.2)  450(4)   OUTPUT   Urine(mL/kg/hr) 300(0.3) 200  500   Drains  5  5   Chest Tube 100 200  300   Shift Total(mL/kg) 400(3.6) 405(3.6)  805(7.2)   Weight (kg) 111.6 111.6 111.6 111.6     Physical Exam  Resting comfortably on recliner chair  Voice is dysphonic but strong, good articulation  Left transcervical incision c/d/i with nearby JOANN drain    Flexible Laryngoscopy     Nasal Cavity/Nasopharynx: Normal mucosa, inferior turbinates and septum. No evidence of septal deviation or perforation. There are no visible lesions. Bianca within normal limits.  Oropharynx: Pharyngeal wall without edema, lesions, or masses. Bilateral palatine tonsils within normal limits. Base of tongue symmetric without masses or lesions. Lingual tonsil within normal limits.  Hypopharynx: No lesions or masses noted within the piriform sinuses or  post cricoid area. No pooling of secretions.  Supraglottis: There is no edema of the arytenoids, interarytenoid space or epiglottis. Epiglottis is crisp. There are no masses or lesions noted. False vocal folds without masses or lesions.  Glottis: True vocal folds without masses or lesions. Normal mobility of right true vocal fold. Left true vocal fold in paramedian position and a mild sulcus along the middle thirds.     Inspiration      Phonation      Significant Labs:  BMP:   Recent Labs   Lab 03/15/23  0346   *      CO2 24   BUN 21   CREATININE 1.0   CALCIUM 8.3*   MG 2.0     CBC:   Recent Labs   Lab 03/15/23  0346   WBC 7.43   RBC 3.58*   HGB 11.0*   HCT 34.3*   *   MCV 96   MCH 30.7   MCHC 32.1     Significant Diagnostics:  I have reviewed all pertinent imaging results/findings within the past 24 hours.

## 2023-03-16 NOTE — PROGRESS NOTES
[facility] - [facility]  General Surgery  Progress Note    Subjective:     History of Present Illness:  No notes on file    Post-Op Info:  Procedure(s) (LRB):  XI ROBOTIC ESOPHAGECTOMY (N/A)  ROBOTIC JEJUNOSTOMY TUBE INSERTION (N/A)   2 Days Post-Op     Interval History: NAEON. Still reporting some hoarseness. Overall feels well, reporting coughing some phlegm. CT 450cc ss output, on water seal. Neck JOANN drain with 70cc ss output. Abdominal pain minimal to none.       Medications:  Continuous Infusions:  Scheduled Meds:   acetaminophen  499.5074 mg Per J Tube Q8H    allopurinoL  100 mg Per J Tube Daily    enoxaparin  40 mg Subcutaneous Daily    ketorolac  15 mg Intravenous Q6H    levalbuterol  0.63 mg Nebulization Q6H WAKE    methocarbamol (ROBAXIN) IVPB  500 mg Intravenous Q8H    metoprolol  5 mg Intravenous Q6H    pantoprazole  40 mg Intravenous Daily    sennosides 8.8 mg/5 ml  5 mL Per J Tube QHS    tamsulosin  0.4 mg Oral Daily     PRN Meds:glucagon (human recombinant), insulin aspart U-100, ondansetron, oxyCODONE, oxyCODONE, sodium chloride 0.9%     Review of patient's allergies indicates:  No Known Allergies  Objective:     Vital Signs (Most Recent):  Temp: 98.9 °F (37.2 °C) (03/16/23 0816)  Pulse: 75 (03/16/23 0822)  Resp: 18 (03/16/23 0906)  BP: 130/62 (03/16/23 0816)  SpO2: 96 % (03/16/23 0822) Vital Signs (24h Range):  Temp:  [96.6 °F (35.9 °C)-98.9 °F (37.2 °C)] 98.9 °F (37.2 °C)  Pulse:  [57-84] 75  Resp:  [16-20] 18  SpO2:  [90 %-99 %] 96 %  BP: (105-148)/(55-67) 130/62     Weight: 111.6 kg (246 lb)  Body mass index is 33.36 kg/m².    Intake/Output - Last 3 Shifts         03/14 0700  03/15 0659 03/15 0700 03/16 0659 03/16 0700  03/17 0659    I.V. (mL/kg) 1158.6 (10.4) 1104.3 (9.9)     NG/GT  60     IV Piggyback 3995.6 97.5     Total Intake(mL/kg) 5154.1 (46.2) 1261.7 (11.3)     Urine (mL/kg/hr) 1195 (0.4) 985 (0.4) 300 (0.8)    Drains 30 70     Blood 125      Chest Tube 440 450 100    Total Output  1790 1505 400    Net +3364.1 -243.3 -400                   Physical Exam  Constitutional:       General: He is not in acute distress.  Neck:      Comments: Left lateral neck drain with ss output  HEENT:     Hoarse, weak cough  Cardiovascular:      Rate and Rhythm: Normal rate and regular rhythm.   Pulmonary:      Effort: Pulmonary effort is normal. No respiratory distress.      Breath sounds: Normal breath sounds.      Comments: R sided CT to suction with ss output, no air leaks   Abdominal:      General: Abdomen is flat. Bowel sounds are normal. There is no distension.      Palpations: Abdomen is soft.      Tenderness: There is abdominal tenderness (appropriate TTP, non peritonitic). There is no guarding.      Comments: J tube in LUQ  Port incisions c/d/i        Significant Labs:  I have reviewed all pertinent lab results within the past 24 hours.  CBC:   Recent Labs   Lab 03/15/23  0346   WBC 7.43   RBC 3.58*   HGB 11.0*   HCT 34.3*   *   MCV 96   MCH 30.7   MCHC 32.1     BMP:   Recent Labs   Lab 03/15/23  0346   *      K 4.6      CO2 24   BUN 21   CREATININE 1.0   CALCIUM 8.3*   MG 2.0       Significant Diagnostics:  I have reviewed all pertinent imaging results/findings within the past 24 hours.    Assessment/Plan:     * Esophageal adenocarcinoma  Lukasz Person is a 67 y.o. with PMHx of HTN, T2DM, KUMAR on CPAP, CAD, CKD3, A fib, HFrEF s/p XI ROBOTIC ESOPHAGECTOMY (N/A), ROBOTIC JEJUNOSTOMY TUBE INSERTION (N/A) 2 Days Post-Op    - NPO  - start tube feeds @ 10/hr Maricarmen Mckeon 1.4 + FWF 200cc Q6  - d/c mIVF  - ENT consulted for hoarseness and possible medialization of vocal cord  - d/c PCA  - pain meds via J tube  - beta blocker via J tube  - liquid senna via J tube  - d/c Shelton  - d/c chest tube  - stepdown from SICU  - PRN nausea meds  - Replace electrolytes PRN  - DVT prophylaxis   - OOBTC/Ambulate  - aggressive pulmonary toilet  - IS  - PT/OT    Dispo: SHABBIR Francois,  MD  General Surgery  [facility]

## 2023-03-16 NOTE — ASSESSMENT & PLAN NOTE
67 year-old male with T3N0M0 adenocarcinoma of the distal esophagus s/p robotic esophagectomy and a left transcervical approach on 3/14. ENT consulted for persistent dysphonia. Flexible laryngoscopy shows a left true vocal fold hypomobility. There is also a glottic gap with maximal phonation. Primary team requesting medialization prior to discharge.     -- To OR on Friday, 3/17 with Dr. Altman for direct laryngoscopy and injection augmentation of left true vocal fold   -- Risks and benefits discussed with patient and wife at bedside; consent signed; witnessed by SHABBIR RN  -- NPO at midnight; hold tubefeeding  -- Please Secure Chat me for any questions, page ENT on-call if unresponsive or urgent

## 2023-03-16 NOTE — PROGRESS NOTES
[facility] - [facility]  Adult Nutrition  Progress Note    SUMMARY       Recommendations    1.) Recommend when medically feasible to initiate TF of Maricarmen ditlo Standard 1.4 with the goal rate of 80ml/hr x18hrs (2p-8a) to provide 2016kcal, 89gPRO, and 1037ml FF (100% of EEN and 88% of EPN)- advance slowly.     2.) RD to monitor tolerance, skin, labs, wt.      Goals: to meet % of EEN/EPN by next RD f/u  Nutrition Goal Status: new  Communication of RD Recs:  (POC)    Assessment and Plan    Nutrition Problem  Inadequate oral intake    Related to (etiology):   Esophageal adenocarcinoma; s/p esophagectomy     Signs and Symptoms (as evidenced by):   NPO, need for EN    Interventions/Recommendations (treatment strategy):  Collaboration of nutritional care with other providers.  EN    Nutrition Diagnosis Status:   New     Reason for Assessment    Reason For Assessment: new tube feeding  Diagnosis: cancer diagnosis/related complications (esophageal adenocarcinoma)  Relevant Medical History: CAD, KUMAR on CPAP, DM2, and CKD3 who presents s/p esophagectomy and PEJ tube  Interdisciplinary Rounds: did not attend  General Information Comments: New TF recs: Pt seen by RD. Pt denies n/v/d/c. Pt appears well nourished at this time, NFPE not warranted. Some wt loss noted in the chart: -6%x3mo. Pt endorses good appetite PTA and before they fell ill. RD to monitor.  Nutrition Discharge Planning: as per medical course    Nutrition Risk Screen    Nutrition Risk Screen: tube feeding or parenteral nutrition    Nutrition/Diet History    Spiritual, Cultural Beliefs, Latter day Practices, Values that Affect Care: no    Anthropometrics    Temp: 98.9 °F (37.2 °C)  Height: 6' (182.9 cm)  Height (inches): 72 in  Weight Method: Standard Scale  Weight: 111.6 kg (246 lb)  Weight (lb): 246 lb  Ideal Body Weight (IBW), Male: 178 lb  % Ideal Body Weight, Male (lb): 138.2 %  BMI (Calculated): 33.4  BMI Grade: 30 - 34.9- obesity - grade I      Lab/Procedures/Meds    Pertinent Labs Reviewed: reviewed  Pertinent Labs Comments: gluc: 201, Ca: 8.3, alb: 3.4, AST: 264, ALT: 254  Pertinent Medications Reviewed: reviewed  Pertinent Medications Comments: allopurinol, enoxaparin, abx, senosides, pantoprazole,    Estimated/Assessed Needs    Weight Used For Calorie Calculations: 111.6 kg (246 lb 0.5 oz)  Energy Calorie Requirements (kcal): 1924- 2116kcal  Energy Need Method: [place]-St Jeor (MSJx1.0-1.1)  Protein Requirements: 101- 123g (0.9- 1.1g/kg)  Weight Used For Protein Calculations: 111.6 kg (246 lb 0.5 oz)  Fluid Requirements (mL): 1ml/1kcal or per MD  Estimated Fluid Requirement Method: RDA Method  RDA Method (mL): 1924     Nutrition Prescription Ordered    Current Diet Order: NPO (sips of water and ice chips)    Evaluation of Received Nutrient/Fluid Intake    I/O: +2231ml since 3/14  Energy Calories Required: not meeting needs  Protein Required: not meeting needs  Fluid Required: not meeting needs  Comments: LBM 3/13  % Intake of Estimated Energy Needs: 0 - 25 %  % Meal Intake: NPO    Nutrition Risk    Level of Risk/Frequency of Follow-up:  (RD to f/u x1/week)     Monitor and Evaluation    Food and Nutrient Intake: energy intake, enteral nutrition intake  Food and Nutrient Adminstration: diet order, enteral and parenteral nutrition administration  Physical Activity and Function: nutrition-related ADLs and IADLs  Anthropometric Measurements: weight, weight change, body mass index  Biochemical Data, Medical Tests and Procedures: electrolyte and renal panel, gastrointestinal profile, glucose/endocrine profile, inflammatory profile, lipid profile  Nutrition-Focused Physical Findings: overall appearance, skin     Nutrition Follow-Up    RD Follow-up?: Yes

## 2023-03-16 NOTE — HPI
Mr. Person is a 67 year-old male with T3N0M0 adenocarcinoma of the distal esophagus s/p robotic esophagectomy with a left transcervical approach on 3/14. He was extubated postoperatively without any complications. He has persistent dysphonia and weak cough. ENT consulted for further evaluation.     Mr. Person states his voice was fine postoperatively but progressively became hoarse. It worsens with use. He does not have airway distress or trouble breathing. He will remain NPO for 2 weeks and undergo esophagram  at that time. His anticipated discharge date is this Sunday.

## 2023-03-17 ENCOUNTER — ANESTHESIA (OUTPATIENT)
Dept: SURGERY | Facility: HOSPITAL | Age: 68
DRG: 327 | End: 2023-03-17
Payer: MEDICARE

## 2023-03-17 LAB
ALBUMIN SERPL BCP-MCNC: 2.9 G/DL (ref 3.5–5.2)
ALP SERPL-CCNC: 73 U/L (ref 55–135)
ALT SERPL W/O P-5'-P-CCNC: 191 U/L (ref 10–44)
ANION GAP SERPL CALC-SCNC: 10 MMOL/L (ref 8–16)
ANION GAP SERPL CALC-SCNC: 8 MMOL/L (ref 8–16)
AST SERPL-CCNC: 84 U/L (ref 10–40)
BASOPHILS # BLD AUTO: 0.02 K/UL (ref 0–0.2)
BASOPHILS NFR BLD: 0.4 % (ref 0–1.9)
BILIRUB SERPL-MCNC: 0.8 MG/DL (ref 0.1–1)
BUN SERPL-MCNC: 17 MG/DL (ref 8–23)
BUN SERPL-MCNC: 18 MG/DL (ref 8–23)
CALCIUM SERPL-MCNC: 8.4 MG/DL (ref 8.7–10.5)
CALCIUM SERPL-MCNC: 8.5 MG/DL (ref 8.7–10.5)
CHLORIDE SERPL-SCNC: 103 MMOL/L (ref 95–110)
CHLORIDE SERPL-SCNC: 105 MMOL/L (ref 95–110)
CO2 SERPL-SCNC: 23 MMOL/L (ref 23–29)
CO2 SERPL-SCNC: 26 MMOL/L (ref 23–29)
CREAT SERPL-MCNC: 0.9 MG/DL (ref 0.5–1.4)
CREAT SERPL-MCNC: 0.9 MG/DL (ref 0.5–1.4)
CRP SERPL-MCNC: 69.6 MG/L (ref 0–3.19)
CRP SERPL-MCNC: 70.57 MG/L (ref 0–3.19)
DIFFERENTIAL METHOD: ABNORMAL
EOSINOPHIL # BLD AUTO: 0.1 K/UL (ref 0–0.5)
EOSINOPHIL NFR BLD: 1.7 % (ref 0–8)
ERYTHROCYTE [DISTWIDTH] IN BLOOD BY AUTOMATED COUNT: 17.5 % (ref 11.5–14.5)
EST. GFR  (NO RACE VARIABLE): >60 ML/MIN/1.73 M^2
EST. GFR  (NO RACE VARIABLE): >60 ML/MIN/1.73 M^2
GLUCOSE SERPL-MCNC: 138 MG/DL (ref 70–110)
GLUCOSE SERPL-MCNC: 150 MG/DL (ref 70–110)
HCT VFR BLD AUTO: 30.3 % (ref 40–54)
HGB BLD-MCNC: 9.7 G/DL (ref 14–18)
IMM GRANULOCYTES # BLD AUTO: 0.04 K/UL (ref 0–0.04)
IMM GRANULOCYTES NFR BLD AUTO: 0.9 % (ref 0–0.5)
LYMPHOCYTES # BLD AUTO: 0.3 K/UL (ref 1–4.8)
LYMPHOCYTES NFR BLD: 5.8 % (ref 18–48)
MAGNESIUM SERPL-MCNC: 2 MG/DL (ref 1.6–2.6)
MCH RBC QN AUTO: 31.5 PG (ref 27–31)
MCHC RBC AUTO-ENTMCNC: 32 G/DL (ref 32–36)
MCV RBC AUTO: 98 FL (ref 82–98)
MONOCYTES # BLD AUTO: 0.6 K/UL (ref 0.3–1)
MONOCYTES NFR BLD: 12 % (ref 4–15)
NEUTROPHILS # BLD AUTO: 3.7 K/UL (ref 1.8–7.7)
NEUTROPHILS NFR BLD: 79.2 % (ref 38–73)
NRBC BLD-RTO: 0 /100 WBC
PHOSPHATE SERPL-MCNC: 2.2 MG/DL (ref 2.7–4.5)
PLATELET # BLD AUTO: 100 K/UL (ref 150–450)
PMV BLD AUTO: 10 FL (ref 9.2–12.9)
POCT GLUCOSE: 127 MG/DL (ref 70–110)
POCT GLUCOSE: 128 MG/DL (ref 70–110)
POCT GLUCOSE: 148 MG/DL (ref 70–110)
POCT GLUCOSE: 149 MG/DL (ref 70–110)
POTASSIUM SERPL-SCNC: 3.9 MMOL/L (ref 3.5–5.1)
POTASSIUM SERPL-SCNC: 4.1 MMOL/L (ref 3.5–5.1)
PROT SERPL-MCNC: 6 G/DL (ref 6–8.4)
RBC # BLD AUTO: 3.08 M/UL (ref 4.6–6.2)
SODIUM SERPL-SCNC: 137 MMOL/L (ref 136–145)
SODIUM SERPL-SCNC: 138 MMOL/L (ref 136–145)
WBC # BLD AUTO: 4.67 K/UL (ref 3.9–12.7)

## 2023-03-17 PROCEDURE — 85025 COMPLETE CBC W/AUTO DIFF WBC: CPT | Performed by: STUDENT IN AN ORGANIZED HEALTH CARE EDUCATION/TRAINING PROGRAM

## 2023-03-17 PROCEDURE — 25000003 PHARM REV CODE 250: Performed by: STUDENT IN AN ORGANIZED HEALTH CARE EDUCATION/TRAINING PROGRAM

## 2023-03-17 PROCEDURE — D9220A PRA ANESTHESIA: ICD-10-PCS | Mod: CRNA,,, | Performed by: NURSE ANESTHETIST, CERTIFIED REGISTERED

## 2023-03-17 PROCEDURE — 94664 DEMO&/EVAL PT USE INHALER: CPT

## 2023-03-17 PROCEDURE — 36000709 HC OR TIME LEV III EA ADD 15 MIN: Performed by: OTOLARYNGOLOGY

## 2023-03-17 PROCEDURE — 99900035 HC TECH TIME PER 15 MIN (STAT)

## 2023-03-17 PROCEDURE — 94761 N-INVAS EAR/PLS OXIMETRY MLT: CPT

## 2023-03-17 PROCEDURE — 20600001 HC STEP DOWN PRIVATE ROOM

## 2023-03-17 PROCEDURE — 94640 AIRWAY INHALATION TREATMENT: CPT

## 2023-03-17 PROCEDURE — 36415 COLL VENOUS BLD VENIPUNCTURE: CPT | Performed by: STUDENT IN AN ORGANIZED HEALTH CARE EDUCATION/TRAINING PROGRAM

## 2023-03-17 PROCEDURE — 25000003 PHARM REV CODE 250

## 2023-03-17 PROCEDURE — 80048 BASIC METABOLIC PNL TOTAL CA: CPT | Mod: XB | Performed by: STUDENT IN AN ORGANIZED HEALTH CARE EDUCATION/TRAINING PROGRAM

## 2023-03-17 PROCEDURE — 84100 ASSAY OF PHOSPHORUS: CPT | Performed by: STUDENT IN AN ORGANIZED HEALTH CARE EDUCATION/TRAINING PROGRAM

## 2023-03-17 PROCEDURE — D9220A PRA ANESTHESIA: Mod: ANES,,, | Performed by: ANESTHESIOLOGY

## 2023-03-17 PROCEDURE — 83735 ASSAY OF MAGNESIUM: CPT | Performed by: STUDENT IN AN ORGANIZED HEALTH CARE EDUCATION/TRAINING PROGRAM

## 2023-03-17 PROCEDURE — 63600175 PHARM REV CODE 636 W HCPCS: Performed by: STUDENT IN AN ORGANIZED HEALTH CARE EDUCATION/TRAINING PROGRAM

## 2023-03-17 PROCEDURE — 82962 GLUCOSE BLOOD TEST: CPT | Performed by: OTOLARYNGOLOGY

## 2023-03-17 PROCEDURE — 71000033 HC RECOVERY, INTIAL HOUR: Performed by: OTOLARYNGOLOGY

## 2023-03-17 PROCEDURE — 63600175 PHARM REV CODE 636 W HCPCS

## 2023-03-17 PROCEDURE — D9220A PRA ANESTHESIA: ICD-10-PCS | Mod: ANES,,, | Performed by: ANESTHESIOLOGY

## 2023-03-17 PROCEDURE — 86141 C-REACTIVE PROTEIN HS: CPT | Performed by: STUDENT IN AN ORGANIZED HEALTH CARE EDUCATION/TRAINING PROGRAM

## 2023-03-17 PROCEDURE — C9113 INJ PANTOPRAZOLE SODIUM, VIA: HCPCS | Performed by: STUDENT IN AN ORGANIZED HEALTH CARE EDUCATION/TRAINING PROGRAM

## 2023-03-17 PROCEDURE — C1878 MATRL FOR VOCAL CORD: HCPCS | Performed by: OTOLARYNGOLOGY

## 2023-03-17 PROCEDURE — 27000221 HC OXYGEN, UP TO 24 HOURS

## 2023-03-17 PROCEDURE — D9220A PRA ANESTHESIA: Mod: CRNA,,, | Performed by: NURSE ANESTHETIST, CERTIFIED REGISTERED

## 2023-03-17 PROCEDURE — 25000242 PHARM REV CODE 250 ALT 637 W/ HCPCS: Performed by: STUDENT IN AN ORGANIZED HEALTH CARE EDUCATION/TRAINING PROGRAM

## 2023-03-17 PROCEDURE — 71000015 HC POSTOP RECOV 1ST HR: Performed by: OTOLARYNGOLOGY

## 2023-03-17 PROCEDURE — 31571 LARYNGOSCOP W/VC INJ + SCOPE: CPT | Mod: GC,,, | Performed by: OTOLARYNGOLOGY

## 2023-03-17 PROCEDURE — 36000708 HC OR TIME LEV III 1ST 15 MIN: Performed by: OTOLARYNGOLOGY

## 2023-03-17 PROCEDURE — 37000008 HC ANESTHESIA 1ST 15 MINUTES: Performed by: OTOLARYNGOLOGY

## 2023-03-17 PROCEDURE — 80053 COMPREHEN METABOLIC PANEL: CPT | Performed by: STUDENT IN AN ORGANIZED HEALTH CARE EDUCATION/TRAINING PROGRAM

## 2023-03-17 PROCEDURE — 31571 PR LARYNGOSCOPY,DIRECT,SCOPE,INJ CORDS: ICD-10-PCS | Mod: GC,,, | Performed by: OTOLARYNGOLOGY

## 2023-03-17 PROCEDURE — 71000016 HC POSTOP RECOV ADDL HR: Performed by: OTOLARYNGOLOGY

## 2023-03-17 PROCEDURE — 25000003 PHARM REV CODE 250: Performed by: NURSE ANESTHETIST, CERTIFIED REGISTERED

## 2023-03-17 PROCEDURE — 37000009 HC ANESTHESIA EA ADD 15 MINS: Performed by: OTOLARYNGOLOGY

## 2023-03-17 PROCEDURE — 97535 SELF CARE MNGMENT TRAINING: CPT

## 2023-03-17 PROCEDURE — 25000242 PHARM REV CODE 250 ALT 637 W/ HCPCS

## 2023-03-17 PROCEDURE — 63600175 PHARM REV CODE 636 W HCPCS: Performed by: NURSE ANESTHETIST, CERTIFIED REGISTERED

## 2023-03-17 RX ORDER — ONDANSETRON 2 MG/ML
INJECTION INTRAMUSCULAR; INTRAVENOUS
Status: DISCONTINUED | OUTPATIENT
Start: 2023-03-17 | End: 2023-03-17

## 2023-03-17 RX ORDER — ROCURONIUM BROMIDE 10 MG/ML
INJECTION, SOLUTION INTRAVENOUS
Status: DISCONTINUED | OUTPATIENT
Start: 2023-03-17 | End: 2023-03-17

## 2023-03-17 RX ORDER — HALOPERIDOL 5 MG/ML
0.5 INJECTION INTRAMUSCULAR EVERY 10 MIN PRN
Status: DISCONTINUED | OUTPATIENT
Start: 2023-03-17 | End: 2023-03-17 | Stop reason: HOSPADM

## 2023-03-17 RX ORDER — SODIUM CHLORIDE 0.9 % (FLUSH) 0.9 %
10 SYRINGE (ML) INJECTION
Status: DISCONTINUED | OUTPATIENT
Start: 2023-03-17 | End: 2023-03-17 | Stop reason: HOSPADM

## 2023-03-17 RX ORDER — LIDOCAINE HYDROCHLORIDE 10 MG/ML
INJECTION, SOLUTION EPIDURAL; INFILTRATION; INTRACAUDAL; PERINEURAL
Status: DISCONTINUED | OUTPATIENT
Start: 2023-03-17 | End: 2023-03-17

## 2023-03-17 RX ORDER — FENTANYL CITRATE 50 UG/ML
INJECTION, SOLUTION INTRAMUSCULAR; INTRAVENOUS
Status: COMPLETED
Start: 2023-03-17 | End: 2023-03-17

## 2023-03-17 RX ORDER — FENTANYL CITRATE 50 UG/ML
25 INJECTION, SOLUTION INTRAMUSCULAR; INTRAVENOUS EVERY 5 MIN PRN
Status: DISCONTINUED | OUTPATIENT
Start: 2023-03-17 | End: 2023-03-17 | Stop reason: HOSPADM

## 2023-03-17 RX ORDER — FUROSEMIDE 10 MG/ML
20 INJECTION INTRAMUSCULAR; INTRAVENOUS ONCE
Status: COMPLETED | OUTPATIENT
Start: 2023-03-17 | End: 2023-03-17

## 2023-03-17 RX ORDER — MIDAZOLAM HYDROCHLORIDE 1 MG/ML
INJECTION INTRAMUSCULAR; INTRAVENOUS
Status: DISCONTINUED | OUTPATIENT
Start: 2023-03-17 | End: 2023-03-17

## 2023-03-17 RX ORDER — PROPOFOL 10 MG/ML
VIAL (ML) INTRAVENOUS
Status: DISCONTINUED | OUTPATIENT
Start: 2023-03-17 | End: 2023-03-17

## 2023-03-17 RX ORDER — SUCCINYLCHOLINE CHLORIDE 20 MG/ML
INJECTION INTRAMUSCULAR; INTRAVENOUS
Status: DISCONTINUED | OUTPATIENT
Start: 2023-03-17 | End: 2023-03-17

## 2023-03-17 RX ORDER — FENTANYL CITRATE 50 UG/ML
INJECTION, SOLUTION INTRAMUSCULAR; INTRAVENOUS
Status: DISCONTINUED | OUTPATIENT
Start: 2023-03-17 | End: 2023-03-17

## 2023-03-17 RX ADMIN — ACETAMINOPHEN 499.51 MG: 160 SOLUTION ORAL at 03:03

## 2023-03-17 RX ADMIN — MIDAZOLAM HYDROCHLORIDE 1 MG: 1 INJECTION INTRAMUSCULAR; INTRAVENOUS at 01:03

## 2023-03-17 RX ADMIN — METOPROLOL TARTRATE 12.5 MG: 25 TABLET, FILM COATED ORAL at 09:03

## 2023-03-17 RX ADMIN — TAMSULOSIN HYDROCHLORIDE 0.4 MG: 0.4 CAPSULE ORAL at 09:03

## 2023-03-17 RX ADMIN — ONDANSETRON 4 MG: 2 INJECTION INTRAMUSCULAR; INTRAVENOUS at 02:03

## 2023-03-17 RX ADMIN — OXYCODONE HYDROCHLORIDE 10 MG: 5 SOLUTION ORAL at 09:03

## 2023-03-17 RX ADMIN — POTASSIUM PHOSPHATE, MONOBASIC AND POTASSIUM PHOSPHATE, DIBASIC 30 MMOL: 224; 236 INJECTION, SOLUTION, CONCENTRATE INTRAVENOUS at 10:03

## 2023-03-17 RX ADMIN — SODIUM CHLORIDE: 0.9 INJECTION, SOLUTION INTRAVENOUS at 01:03

## 2023-03-17 RX ADMIN — PANTOPRAZOLE SODIUM 40 MG: 40 INJECTION, POWDER, FOR SOLUTION INTRAVENOUS at 09:03

## 2023-03-17 RX ADMIN — ENOXAPARIN SODIUM 40 MG: 40 INJECTION SUBCUTANEOUS at 04:03

## 2023-03-17 RX ADMIN — ACETAMINOPHEN 499.51 MG: 160 SOLUTION ORAL at 05:03

## 2023-03-17 RX ADMIN — METHOCARBAMOL 500 MG: 100 INJECTION, SOLUTION INTRAMUSCULAR; INTRAVENOUS at 05:03

## 2023-03-17 RX ADMIN — FENTANYL CITRATE 25 MCG: 50 INJECTION, SOLUTION INTRAMUSCULAR; INTRAVENOUS at 03:03

## 2023-03-17 RX ADMIN — SUGAMMADEX 200 MG: 100 INJECTION, SOLUTION INTRAVENOUS at 02:03

## 2023-03-17 RX ADMIN — KETOROLAC TROMETHAMINE 15 MG: 15 INJECTION, SOLUTION INTRAMUSCULAR; INTRAVENOUS at 05:03

## 2023-03-17 RX ADMIN — LEVALBUTEROL HYDROCHLORIDE 0.63 MG: 0.63 SOLUTION RESPIRATORY (INHALATION) at 08:03

## 2023-03-17 RX ADMIN — LEVALBUTEROL HYDROCHLORIDE 0.63 MG: 0.63 SOLUTION RESPIRATORY (INHALATION) at 03:03

## 2023-03-17 RX ADMIN — METOPROLOL TARTRATE 12.5 MG: 25 TABLET, FILM COATED ORAL at 04:03

## 2023-03-17 RX ADMIN — FENTANYL CITRATE 25 MCG: 50 INJECTION, SOLUTION INTRAMUSCULAR; INTRAVENOUS at 02:03

## 2023-03-17 RX ADMIN — SUCCINYLCHOLINE CHLORIDE 120 MG: 20 INJECTION, SOLUTION INTRAMUSCULAR; INTRAVENOUS at 02:03

## 2023-03-17 RX ADMIN — SENNOSIDES 5 ML: 8.8 SYRUP ORAL at 09:03

## 2023-03-17 RX ADMIN — ROCURONIUM BROMIDE 15 MG: 10 INJECTION, SOLUTION INTRAVENOUS at 02:03

## 2023-03-17 RX ADMIN — PROPOFOL 150 MG: 10 INJECTION, EMULSION INTRAVENOUS at 02:03

## 2023-03-17 RX ADMIN — ACETAMINOPHEN 499.51 MG: 160 SOLUTION ORAL at 09:03

## 2023-03-17 RX ADMIN — KETOROLAC TROMETHAMINE 15 MG: 15 INJECTION, SOLUTION INTRAMUSCULAR; INTRAVENOUS at 12:03

## 2023-03-17 RX ADMIN — KETOROLAC TROMETHAMINE 15 MG: 15 INJECTION, SOLUTION INTRAMUSCULAR; INTRAVENOUS at 02:03

## 2023-03-17 RX ADMIN — LIDOCAINE HYDROCHLORIDE 50 MG: 10 INJECTION, SOLUTION EPIDURAL; INFILTRATION; INTRACAUDAL; PERINEURAL at 02:03

## 2023-03-17 RX ADMIN — OXYCODONE HYDROCHLORIDE 10 MG: 5 SOLUTION ORAL at 04:03

## 2023-03-17 RX ADMIN — FUROSEMIDE 20 MG: 10 INJECTION, SOLUTION INTRAMUSCULAR; INTRAVENOUS at 09:03

## 2023-03-17 NOTE — PROGRESS NOTES
[facility] - [facility]  General Surgery  Progress Note    Subjective:     Post-Op Info:  Procedure(s) (LRB):  XI ROBOTIC ESOPHAGECTOMY (N/A)  ROBOTIC JEJUNOSTOMY TUBE INSERTION (N/A)   3 Days Post-Op     Interval History: No issues overnight, tolerated TF yesterday, have been paused for OR today with ENT. No nausea/vomiting. Pain well controlled. JOANN drain with 5 cc output. CT with 300 cc SS output in 24 hrs.     Medications:  Continuous Infusions:  Scheduled Meds:   acetaminophen  499.5074 mg Per J Tube Q8H    allopurinoL  100 mg Per J Tube Daily    enoxaparin  40 mg Subcutaneous Daily    ketorolac  15 mg Intravenous Q6H    levalbuterol  0.63 mg Nebulization Q6H WAKE    methocarbamol (ROBAXIN) IVPB  500 mg Intravenous Q8H    metoprolol  12.5 mg Per J Tube QID    pantoprazole  40 mg Intravenous Daily    sennosides 8.8 mg/5 ml  5 mL Per J Tube QHS    tamsulosin  0.4 mg Oral Daily     PRN Meds:glucagon (human recombinant), insulin aspart U-100, ondansetron, oxyCODONE, oxyCODONE, sodium chloride 0.9%     Review of patient's allergies indicates:  No Known Allergies  Objective:     Vital Signs (Most Recent):  Temp: 98.6 °F (37 °C) (03/17/23 0341)  Pulse: 60 (03/17/23 0341)  Resp: 18 (03/17/23 0341)  BP: 132/61 (03/17/23 0341)  SpO2: 98 % (03/17/23 0341) Vital Signs (24h Range):  Temp:  [96.8 °F (36 °C)-99.5 °F (37.5 °C)] 98.6 °F (37 °C)  Pulse:  [60-78] 60  Resp:  [17-19] 18  SpO2:  [91 %-100 %] 98 %  BP: (108-132)/(59-64) 132/61     Weight: 111.6 kg (246 lb)  Body mass index is 33.36 kg/m².    Intake/Output - Last 3 Shifts         03/15 0700  03/16 0659 03/16 0700  03/17 0659 03/17 0700 03/18 0659    I.V. (mL/kg) 1104.3 (9.9)      NG/GT 60 650     IV Piggyback 97.5      Total Intake(mL/kg) 1261.7 (11.3) 650 (5.8)     Urine (mL/kg/hr) 985 (0.4) 925 (0.3)     Drains 70 5     Stool  0     Blood       Chest Tube 450 300     Total Output 1505 1230     Net -243.3 -580            Stool Occurrence  0 x             Physical  Exam  Vitals and nursing note reviewed.   Constitutional:       General: He is not in acute distress.  Cardiovascular:      Rate and Rhythm: Normal rate.      Pulses: Normal pulses.      Comments: JOANN drain with serosanginous output  Pulmonary:      Effort: Pulmonary effort is normal. No respiratory distress.      Comments: CT to WS, no air leak  Abdominal:      General: There is no distension.      Palpations: Abdomen is soft.      Tenderness: There is no abdominal tenderness.      Comments: Incisions clean/dry with no erythema or drainage   Neurological:      Mental Status: He is alert.       Significant Labs:  I have reviewed all pertinent lab results within the past 24 hours.  CBC:   Recent Labs   Lab 03/15/23  0346   WBC 7.43   RBC 3.58*   HGB 11.0*   HCT 34.3*   *   MCV 96   MCH 30.7   MCHC 32.1     BMP:   Recent Labs   Lab 03/15/23  0346   *      K 4.6      CO2 24   BUN 21   CREATININE 1.0   CALCIUM 8.3*   MG 2.0     CMP:   Recent Labs   Lab 03/15/23  0346   *   CALCIUM 8.3*   ALBUMIN 3.4*   PROT 6.2      K 4.6   CO2 24      BUN 21   CREATININE 1.0   ALKPHOS 67   *   *   BILITOT 0.5       Significant Diagnostics:  I have reviewed all pertinent imaging results/findings within the past 24 hours.    Assessment/Plan:     * Esophageal adenocarcinoma  Lukasz Person is a 67 y.o. with PMHx of HTN, T2DM, KUMAR on CPAP, CAD, CKD3, A fib, HFrEF s/p XI ROBOTIC ESOPHAGECTOMY (N/A), ROBOTIC JEJUNOSTOMY TUBE INSERTION (N/A) 3 Days Post-Op, doing well.     - NPO  - TF currently paused for OR, will advance to 20cc/hr MaricarmenHealthyTweet 1.4 + FWF 200cc Q6  - ENT consulted, to OR today for medialization of vocal cord  - Possible CT removal today  - am labs in process, will follow-up  - pain meds via J tube  - beta blocker via J tube  - liquid senna via J tube  - PRN nausea meds  - Replace electrolytes PRN  - DVT prophylaxis   - OOBTC/Ambulate  - aggressive pulmonary toilet  - IS  -  PT/OT    Dispo: SHABBIR Faulkner MD  General Surgery  [facility]

## 2023-03-17 NOTE — TRANSFER OF CARE
Anesthesia Transfer of Care Note    Patient: Lukasz Person    Procedure(s) Performed: Procedure(s) (LRB):  INJECTION, VOCAL CORD, LARYNGOSCOPIC (Left)    Patient location: PACU    Anesthesia Type: general    Transport from OR: Transported from OR on 6-10 L/min O2 by face mask with adequate spontaneous ventilation    Post pain: adequate analgesia    Post assessment: no apparent anesthetic complications    Post vital signs: stable    Level of consciousness: awake and alert    Nausea/Vomiting: no nausea/vomiting    Complications: none    Transfer of care protocol was followed      Last vitals:   Visit Vitals  BP (!) 148/70 (BP Location: Right arm, Patient Position: Lying)   Pulse 65   Temp 37.2 °C (99 °F) (Temporal)   Resp 20   Ht 6' (1.829 m)   Wt 111.6 kg (246 lb)   SpO2 99%   BMI 33.36 kg/m²

## 2023-03-17 NOTE — ANESTHESIA PREPROCEDURE EVALUATION
[facility]  Anesthesia Pre-Operative Evaluation   03/16/2023        Lukasz Person, 1955  [id number]  Procedure(s) (LRB):  INJECTION, VOCAL CORD, LARYNGOSCOPIC (Left)    Subjective    Lukasz Person is a 67 y.o. male w/ a significant PMHx of obesity,  DM2, KUMAR (on CPAP), CKD3, CAD and adenocarcinoma of the distal esophagus s/p robotic esophagectomy with a left transcervical approach on 3/14.     He was extubated postoperatively without any complications. He has persistent dysphonia and weak cough.    Patient now presents for above procedure(s).     Prev Airway:       Induction:  Intravenous    Intubated:  Postinduction    Mask Ventilation:  Easy with oral airway    Attempts:  1    Attempted By:  CRNA    Method of Intubation:  Video laryngoscopy    Blade:  Dowling 4    Laryngeal View Grade: Grade I - full view of cords      Difficult Airway Encountered?: No      Complications:  None    Airway Device:  Oral endotracheal tube    Airway Device Size:  7.5    Style/Cuff Inflation:  Cuffed (inflated to minimal occlusive pressure)    Inflation Amount (mL):  9    Tube secured:  23    Secured at:  The lips    Complicating Factors:  Obesity and short neck  LDA:        PowerPort A Cath Single Lumen 12/22/22 0912 right internal jugular (Active)   Patency/Care flushed w/o difficulty;heparin locked;blood return present 02/27/23 1417   Status Accessed 02/27/23 1417   Accessed by: tim lynn 02/27/23 1417   Needle Insertion Date 02/27/23 02/27/23 1417   Needle Insertion Time 1417 02/27/23 1417   Type of Needle Agrawal 02/27/23 1417   Gauge 20 02/27/23 1417   Needle Length 1 in 02/27/23 1417   Needle Status Removed 02/27/23 1417   Flush Performed Yes 02/27/23 1417   Needle Removal Date 02/27/23 02/27/23 1417   Needle Removal Time 1417 02/27/23 1417   Deaccessed By tim lynn 02/27/23 1417   Number of days: 84            Peripheral IV - Single Lumen 03/14/23 0540 20 G Left;Posterior Hand (Active)   Site Assessment  Dry;Clean;Intact;No redness;No swelling 03/16/23 0906   Extremity Assessment Distal to IV No warmth;No swelling;No redness;No abnormal discoloration 03/16/23 0906   Line Status Flushed;Infusing 03/16/23 0906   Dressing Status Clean;Dry;Intact 03/16/23 0906   Dressing Intervention Integrity maintained 03/16/23 0906   Dressing Change Due 03/18/23 03/15/23 0701   Site Change Due 03/18/23 03/15/23 0701   Reason Not Rotated Not due 03/15/23 0701   Number of days: 2            Chest Tube 03/14/23 1021 Tube - 1 Right Midaxillary 24 Fr. (Active)   Chest Tube WDL WDL 03/16/23 0906   Function To water seal 03/16/23 0906   Air Leak/Fluctuation air leak not present;fluctuation not present 03/16/23 0906   Safety all tubing connections taped;2 rubber-tipped hemostats w/ patient;all connections secured 03/16/23 0906   Securement tubing secured to body distal to insertion site w/ tape 03/16/23 0906   Patency Intervention Tip/tilt 03/16/23 0906   Drainage Description Serosanguineous 03/16/23 0906   Dressing Appearance occlusive petroleum gauze dressing intact;w/ dried drainage 03/16/23 0906   Dressing Care dressing changed 03/16/23 0906   Left Subcutaneous Emphysema none present 03/16/23 0906   Right Subcutaneous Emphysema none present 03/16/23 0906   Site Assessment Clean;Dry;Intact;No redness;No swelling 03/16/23 0906   Surrounding Skin Dry;Intact 03/16/23 0906   Output (mL) 200 mL 03/16/23 1537   Number of days: 2            Closed/Suction Drain 03/14/23 1607 Left Neck Bulb 15 Fr. (Active)   Site Description Healing 03/15/23 1000   Drainage Sanguineous 03/15/23 1000   Status To bulb suction 03/15/23 1000   Output (mL) 5 mL 03/16/23 1537   Number of days: 2            Gastrostomy/Enterostomy 03/14/23 1658 Jejunostomy tube LUQ (Active)   Securement secured to abdomen 03/15/23 1000   Clamp Status/Tolerance clamped 03/15/23 1000   Dressing no dressing 03/15/23 1000   Insertion Site no redness;no warmth;no drainage;no  swelling;tenderness;dry 03/15/23 1000   Site Care outer bumper rotated;sterile 4 x 4 gauze dressing applied 03/15/23 1000   Intake (mL) 60 mL 03/15/23 2330   Water Bolus (mL) 200 mL 03/16/23 1726   Tube Feeding Intake (mL) 50 03/16/23 1726   Number of days: 2       Drips: None documented.      Patient Active Problem List   Diagnosis    Type 2 diabetes mellitus with kidney complication, without long-term current use of insulin    KUMAR on CPAP    Gout    Hyperparathyroidism, primary    Hypertension associated with diabetes    Hyperlipidemia associated with type 2 diabetes mellitus    Severe obesity (BMI 35.0-35.9 with comorbidity)    Obesity, diabetes, and hypertension syndrome    DM type 2 without retinopathy    Diabetes mellitus with cataract    Male hypogonadism    Coronary artery disease involving native coronary artery of native heart    Low serum alkaline phosphatase    BPH with urinary obstruction    Erectile dysfunction due to arterial insufficiency    Chronic left shoulder pain    Nephrolithiasis    Paroxysmal atrial fibrillation    Chronic anticoagulation    HFrEF (heart failure with reduced ejection fraction)    Long term current use of amiodarone    Non-ischemic cardiomyopathy    CKD stage 3 due to type 2 diabetes mellitus    Esophageal adenocarcinoma    Aortic atherosclerosis    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       Review of patient's allergies indicates:  No Known Allergies    Current Inpatient Medications:    acetaminophen  499.5074 mg Per J Tube Q8H    allopurinoL  100 mg Per J Tube Daily    enoxaparin  40 mg Subcutaneous Daily    ketorolac  15 mg Intravenous Q6H    levalbuterol  0.63 mg Nebulization Q6H WAKE    methocarbamol (ROBAXIN) IVPB  500 mg Intravenous Q8H    metoprolol  12.5 mg Per J Tube QID    pantoprazole  40 mg Intravenous Daily    sennosides 8.8 mg/5 ml  5 mL Per J Tube QHS    tamsulosin  0.4 mg Oral Daily       No current  facility-administered medications on file prior to encounter.     Current Outpatient Medications on File Prior to Encounter   Medication Sig Dispense Refill    ACCU-CHEK SOFTCLIX LANCETS Misc USE EVERY  each 6    allopurinoL (ZYLOPRIM) 100 MG tablet TAKE 1 TABLET BY MOUTH EVERY DAY 30 tablet 10    blood-glucose meter kit Checks blood sugars 1x/daily. 1 each 12    lisinopriL-hydrochlorothiazide (PRINZIDE,ZESTORETIC) 20-25 mg Tab TAKE 1 TABLET BY MOUTH EVERY DAY 90 tablet 3    metoprolol succinate (TOPROL-XL) 25 MG 24 hr tablet Take 1 tablet (25 mg total) by mouth once daily. 30 tablet 11    omeprazole (PRILOSEC) 40 MG capsule Take 1 capsule (40 mg total) by mouth once daily. 90 capsule 3    rosuvastatin (CRESTOR) 20 MG tablet TAKE 1 TABLET(20 MG) BY MOUTH EVERY DAY (Patient taking differently: Take 20 mg by mouth every evening.) 30 tablet 11    tamsulosin (FLOMAX) 0.4 mg Cap Take 1 capsule (0.4 mg total) by mouth once daily. (Patient taking differently: Take 0.4 mg by mouth nightly.) 30 capsule 11    testosterone (ANDROGEL) 20.25 mg/1.25 gram (1.62 %) GlPm Apply 4 pumps to shoulders daily 2 each 5    glimepiride (AMARYL) 2 MG tablet TAKE 2 TABLETS BY MOUTH EVERY MORNING 180 tablet 2    nitroGLYCERIN (NITROSTAT) 0.4 MG SL tablet Place 1 tablet (0.4 mg total) under the tongue every 5 (five) minutes as needed for Chest pain. If you need a third tablet, call 911 60 tablet 12    rivaroxaban (XARELTO) 20 mg Tab Take 1 tablet (20 mg total) by mouth daily with dinner or evening meal. 30 tablet 11       Past Surgical History:   Procedure Laterality Date    CORONARY ANGIOGRAPHY N/A 9/30/2020    Procedure: ANGIOGRAM, CORONARY ARTERY;  Surgeon: John West MD;  Location: [facility] CATH LAB;  Service: Cardiology;  Laterality: N/A;    CYSTOSCOPY N/A 2/17/2022    Procedure: CYSTOSCOPY;  Surgeon: Lukasz Hughes MD;  Location: [facility] OR [address] [place] [address];  Service: Urology;  Laterality: N/A;    CYSTOSCOPY W/  URETERAL STENT PLACEMENT N/A 2/25/2022    Procedure: CYSTOSCOPY, WITH URETERAL STENT INSERTION;  Surgeon: Lukasz Hughes MD;  Location: [facility] OR 1ST FLR;  Service: Urology;  Laterality: N/A;    ENDOSCOPIC ULTRASOUND OF UPPER GASTROINTESTINAL TRACT N/A 12/7/2022    Procedure: ULTRASOUND, UPPER GI TRACT, ENDOSCOPIC;  Surgeon: Darian Main MD;  Location: [facility] ENDO;  Service: Endoscopy;  Laterality: N/A;  Approval to hold Xarelto rec'd from Dr. Nick (see t/e 12/5/22)-DS    ESOPHAGOGASTRODUODENOSCOPY N/A 11/17/2022    Procedure: EGD (ESOPHAGOGASTRODUODENOSCOPY);  Surgeon: Brody Gonzales MD;  Location: [facility] ENDO (2ND FLR);  Service: Endoscopy;  Laterality: N/A;  inst via email-ok to hold Xarelto x 2 days-MS    LASER LITHOTRIPSY Left 2/25/2022    Procedure: LITHOTRIPSY, USING LASER;  Surgeon: Lukasz Hughes MD;  Location: [facility] OR 1ST FLR;  Service: Urology;  Laterality: Left;    LEFT HEART CATHETERIZATION Left 9/30/2020    Procedure: Left heart cath;  Surgeon: John West MD;  Location: [facility] CATH LAB;  Service: Cardiology;  Laterality: Left;    LITHOTRIPSY      PARATHYROIDECTOMY  1/1/2-107    PYELOSCOPY Left 2/25/2022    Procedure: PYELOSCOPY;  Surgeon: Lukasz Hughes MD;  [facility];  Service: Urology;  Laterality: Left;    ROBOT-ASSISTED SURGICAL REMOVAL OF ESOPHAGUS USING DA CARLOS XI N/A 3/14/2023    Procedure: XI ROBOTIC ESOPHAGECTOMY;  Surgeon: Santana Brown Jr., MD;  Location: [facility] OR 2ND FLR;  Service: General;  Laterality: N/A;  Abdomen, Chest and Neck    ROBOTIC JEJUNOSTOMY N/A 3/14/2023    Procedure: ROBOTIC JEJUNOSTOMY TUBE INSERTION;  Surgeon: Santana Brown Jr., MD;  Location: [facility] OR 2ND FLR;  Service: General;  Laterality: N/A;    TRANSESOPHAGEAL ECHOCARDIOGRAPHY N/A 4/7/2022    Procedure: ECHOCARDIOGRAM, TRANSESOPHAGEAL;  Surgeon: [facility] Diagnostic Provider;  Location: [facility] EP LAB;  Service: Cardiology;  Laterality: N/A;    TREATMENT OF CARDIAC  ARRHYTHMIA N/A 4/7/2022    Procedure: Cardioversion or Defibrillation;  Surgeon: Iris Fisher NP;  Location: [facility] EP LAB;  Service: Cardiology;  Laterality: N/A;  afib, dccv, artie, anes, EH, 3prep    URETEROSCOPIC REMOVAL OF URETERIC CALCULUS Left 2/25/2022    Procedure: REMOVAL, CALCULUS, URETER, URETEROSCOPIC;  Surgeon: Lukasz Hughes MD;  Location: [facility] OR [address] [place] [address];  Service: Urology;  Laterality: Left;    URETEROSCOPY Left 2/25/2022    Procedure: URETEROSCOPY;  Surgeon: Lukasz Hughes MD;  Location: [facility] OR [address] [place] [address];  Service: Urology;  Laterality: Left;       Social History:  Tobacco Use: Medium Risk    Smoking Tobacco Use: Former    Smokeless Tobacco Use: Never    Passive Exposure: Never       Alcohol Use: Not At Risk    Frequency of Alcohol Consumption: Monthly or less    Average Number of Drinks: 1 or 2    Frequency of Binge Drinking: Never       Objective    Vital Signs Range:  BMI Readings from Last 1 Encounters:   03/14/23 33.36 kg/m²       Temp:  [37.2 °C (98.9 °F)-37.5 °C (99.5 °F)]   Pulse:  [72-75]   Resp:  [17-19]   BP: (108-130)/(62-64)   SpO2:  [91 %-96 %]        Significant Labs:        Component Value Date/Time    WBC 7.43 03/15/2023 0346    HGB 11.0 (L) 03/15/2023 0346    HCT 34.3 (L) 03/15/2023 0346    HCT 32 (L) 03/14/2023 2041     (L) 03/15/2023 0346     03/15/2023 0346    K 4.6 03/15/2023 0346     03/15/2023 0346    CO2 24 03/15/2023 0346     (H) 03/15/2023 0346    BUN 21 03/15/2023 0346    CREATININE 1.0 03/15/2023 0346    MG 2.0 03/15/2023 0346    PHOS 3.8 03/15/2023 0346    CALCIUM 8.3 (L) 03/15/2023 0346    ALBUMIN 3.4 (L) 03/15/2023 0346    PROT 6.2 03/15/2023 0346    ALKPHOS 67 03/15/2023 0346    BILITOT 0.5 03/15/2023 0346     (H) 03/15/2023 0346     (H) 03/15/2023 0346    INR 1.1 04/01/2022 0918    HGBA1C 6.6 (H) 03/14/2023 1748        Please see Results Review for additional labs.     Diagnostic Studies: All relevant studies,  reviewed.      EKG:   Results for orders placed or performed during the hospital encounter of 04/07/22   EKG 12-LEAD    Collection Time: 04/07/22  2:19 PM    Narrative    Test Reason : I48.91,    Vent. Rate : 075 BPM     Atrial Rate : 075 BPM     P-R Int : 158 ms          QRS Dur : 086 ms      QT Int : 408 ms       P-R-T Axes : 043 -19 048 degrees     QTc Int : 455 ms    Normal sinus rhythm  Low voltage QRS in the precordial leads  Inferior infarct  Abnormal ECG  When compared with ECG of 07-APR-2022 11:56,  Sinus rhythm has replaced Atrial fibrillation  Confirmed by Azucena Del Cid MD ([id number]) on 4/7/2022 4:25:34 PM    Referred By: CORINA MONTALVO           Confirmed By:Azucena Del Cid MD       ECHO:  Results for orders placed during the hospital encounter of 05/26/22    Echo Saline Bubble? No    Interpretation Summary  · Mild left atrial enlargement.  · The left ventricle is normal in size with normal systolic function.  · The estimated ejection fraction is 60%.  · Normal left ventricular diastolic function.  · Normal right ventricular size with normal right ventricular systolic function.  · Aortic sclerosis w/o stenosis.  · The estimated PA systolic pressure is 31 mmHg.  · Normal central venous pressure (3 mmHg).        Pre-op Assessment    I have reviewed the Patient Summary Reports.     I have reviewed the Nursing Notes.    I have reviewed the Medications.     Review of Systems  Anesthesia Hx:  No problems with previous Anesthesia  History of prior surgery of interest to airway management or planning: Denies Family Hx of Anesthesia complications.   Denies Personal Hx of Anesthesia complications.   Social:  Non-Smoker    Hematology/Oncology:  Hematology Normal   Oncology Normal     EENT/Dental:EENT/Dental Normal   Cardiovascular:   Hypertension CAD      Pulmonary:   Sleep Apnea, CPAP    Renal/:   Chronic Renal Disease    Hepatic/GI:  Hepatic/GI Normal    Musculoskeletal:  Musculoskeletal Normal     Neurological:  Neurology Normal    Endocrine:   Diabetes    Psych:  Psychiatric Normal           Physical Exam  General: Alert and Oriented    Airway:  Mallampati: II / II  Mouth Opening: Normal  TM Distance: Normal  Tongue: Normal  Neck ROM: Normal ROM    Dental:  Intact        Anesthesia Plan  Type of Anesthesia, risks & benefits discussed:    Anesthesia Type: Gen ETT  Intra-op Monitoring Plan: Standard ASA Monitors  Post Op Pain Control Plan: multimodal analgesia and IV/PO Opioids PRN  Induction:  IV  Airway Plan: Direct, Post-Induction  Informed Consent: Informed consent signed with the Patient and all parties understand the risks and agree with anesthesia plan.  All questions answered.   ASA Score: 3  Day of Surgery Review of History & Physical: H&P Update referred to the surgeon/provider.    Ready For Surgery From Anesthesia Perspective.     .

## 2023-03-17 NOTE — NURSING TRANSFER
Nursing Transfer Note      3/17/2023     Reason patient is being transferred: post-procedure    Transfer To: 1060    Transfer via stretcher    Transfer with none    Transported by Lilia ALARCON    Medicines sent: none    Any special needs or follow-up needed: routine    Chart send with patient: Yes    Notified: spouse

## 2023-03-17 NOTE — PLAN OF CARE
[facility] - [facility]  Discharge Reassessment    Primary Care Provider: Kirk Wilson MD    Expected Discharge Date: 3/19/2023    Reassessment (most recent)       Discharge Reassessment - 03/17/23 1214          Discharge Reassessment    Assessment Type Discharge Planning Reassessment     Did the patient's condition or plan change since previous assessment? No     Discharge Plan discussed with: Patient     Communicated ANDRZEJ with patient/caregiver Yes     Discharge Plan A Home Health     Discharge Barriers Identified None     Why the patient remains in the hospital Requires continued medical care        Post-Acute Status    Post-Acute Authorization Home Health;IV Infusion     Home Health Status Set-up Complete/Auth obtained     Coverage Medicare     IV Infusion Status Set-up Complete/Auth obtained     Hospital Resources/Appts/Education Provided Provided patient/caregiver with written discharge plan information     Patient choice form signed by patient/caregiver List with quality metrics by geographic area provided     Discharge Delays None known at this time                     Pt not ready for discharge due to: Not medically ready  SW will remain available for families in [facility].  Currently pt has d/c plans in progress at this time.    [facility] following for d.cCarmelina Myles LCSW  Case Management/[facility]  [phone]

## 2023-03-17 NOTE — PT/OT/SLP PROGRESS
Physical Therapy      Patient Name:  Lukasz Person   MRN:  [id number]    Patient not seen today secondary to pt off floor for a procedure. Will follow-up per PT POC.

## 2023-03-17 NOTE — ASSESSMENT & PLAN NOTE
67 year-old male with T3N0M0 adenocarcinoma of the distal esophagus s/p robotic esophagectomy and a left transcervical approach on 3/14. ENT consulted for persistent dysphonia. Flexible laryngoscopy shows a left true vocal fold immobility. There is also a glottic gap with maximal phonation. Primary team requesting medialization prior to discharge.     -- To OR today with Dr. Altman for direct laryngoscopy and injection augmentation of left true vocal fold   -- Risks and benefits discussed with patient and wife at bedside; consent signed; witnessed by SHABBIR HERNANDEZ  -- NPO; hold tubefeeding  -- Call with questions or concerns

## 2023-03-17 NOTE — ASSESSMENT & PLAN NOTE
Lukasz Person is a 67 y.o. with PMHx of HTN, T2DM, KUMAR on CPAP, CAD, CKD3, A fib, HFrEF s/p XI ROBOTIC ESOPHAGECTOMY (N/A), ROBOTIC JEJUNOSTOMY TUBE INSERTION (N/A) 3 Days Post-Op, doing well.     - NPO  - TF currently paused for OR, will advance to 20cc/hr [place] 1.4 + [facility] 200cc Q6  - ENT consulted, to OR today for medialization of vocal cord  - Possible CT removal today  - am labs in process, will follow-up  - pain meds via J tube  - beta blocker via J tube  - liquid senna via J tube  - PRN nausea meds  - Replace electrolytes PRN  - DVT prophylaxis   - OOBTC/Ambulate  - aggressive pulmonary toilet  - IS  - PT/OT    Dispo: SHABBIR

## 2023-03-17 NOTE — PLAN OF CARE
Chart reviewed. Preop nursing care completed per orders. Safe surgery checklist complete. Right chest tube to water seal. JOANN drain noted to left neck incision. Jtube noted. Pt arrived to [facility] with 18g IV to right forearm-flushed well. Bilateral foam heel protectors in place and teds. Wife at bedside. Waiting for site oliva prior to surgery. Call bell within reach. Instructed pt to call for assistance.

## 2023-03-17 NOTE — PT/OT/SLP PROGRESS
Occupational Therapy   Treatment    Name: Lukasz Person  MRN: [id number]  Admitting Diagnosis:  Esophageal adenocarcinoma  Day of Surgery    Recommendations:     Discharge Recommendations: home  Discharge Equipment Recommendations:  none  Barriers to discharge:  None    Assessment:     Lukasz Person is a 67 y.o. male with a medical diagnosis of Esophageal adenocarcinoma.  He presents with performance deficits affecting function are weakness, impaired endurance, impaired self care skills, impaired functional mobility, pain, decreased ROM. Pt participates well and is motivated to regain functional independence in mobility and self care.      Rehab Prognosis:  Good; patient would benefit from acute skilled OT services to address these deficits and reach maximum level of function.       Plan:     Patient to be seen 3 x/week to address the above listed problems via self-care/home management, therapeutic activities, therapeutic exercises  Plan of Care Expires: 03/29/23  Plan of Care Reviewed with: patient, spouse    Subjective     Pain/Comfort:  Pain Rating 1: 2/10  Location 1: throat  Pain Addressed 1: Distraction  Pain Rating Post-Intervention 1: 2/10    Objective:     Communicated with: RN prior to session.  Patient found up in chair with chest tube, PEG Tube, peripheral IV, telemetry upon OT entry to room.    General Precautions: Standard, fall    Orthopedic Precautions:N/A  Braces: N/A  Respiratory Status: Room air     Occupational Performance:     Bed Mobility:    Not observed    Functional Mobility/Transfers:  Patient completed Sit <> Stand Transfer with stand by assistance  with  rolling walker   Functional Mobility: SBA with RW    Activities of Daily Living:  Grooming: stand by assistance at sink  Upper Body Dressing: minimum assistance gown as robe  Lower Body Dressing: stand by assistance doff/donning socks in seated figure-4 position  Toileting: stand by assistance at toilet, but urinated in stance in urinal for  RN to measure I/Os.      [facility] 6 Click ADL: 21    Treatment & Education:  Pt edu re OT role, POC and safety.  Pt performed transfers and mobility with SBA and RW.  Pt performed seated dressing tasks with Min A for gown 2* lines, SBA to don socks after edu re technique.  Pt performed toileting and hygiene in bathroom with SBA.    Patient left up in chair with all lines intact, call button in reach, and wife present    GOALS:   Multidisciplinary Problems       Occupational Therapy Goals          Problem: Occupational Therapy    Goal Priority Disciplines Outcome Interventions   Occupational Therapy Goal     OT, PT/OT Ongoing, Progressing    Description: Goals to be met by: 3/29/23     Patient will increase functional independence with ADLs by performing:    Feeding with Supervision.  UE Dressing with Supervision.  LE Dressing with Supervision.  Grooming while standing with Supervision.  Toileting from toilet with Supervision for hygiene and clothing management.                          Time Tracking:     OT Date of Treatment: 03/17/23  OT Start Time: 1030  OT Stop Time: 1054  OT Total Time (min): 24 min    Billable Minutes:Self Care/Home Management 24 minutes    OT/TICO: OT          NORMA Caballero  3/17/2023  Pager: [phone]

## 2023-03-17 NOTE — SUBJECTIVE & OBJECTIVE
Interval History: NAEON.     Medications:  Continuous Infusions:  Scheduled Meds:   acetaminophen  499.5074 mg Per J Tube Q8H    allopurinoL  100 mg Per J Tube Daily    enoxaparin  40 mg Subcutaneous Daily    ketorolac  15 mg Intravenous Q6H    levalbuterol  0.63 mg Nebulization Q6H WAKE    methocarbamol (ROBAXIN) IVPB  500 mg Intravenous Q8H    metoprolol  12.5 mg Per J Tube QID    pantoprazole  40 mg Intravenous Daily    sennosides 8.8 mg/5 ml  5 mL Per J Tube QHS    tamsulosin  0.4 mg Oral Daily     PRN Meds:glucagon (human recombinant), insulin aspart U-100, ondansetron, oxyCODONE, oxyCODONE, sodium chloride 0.9%     Review of patient's allergies indicates:  No Known Allergies  Objective:     Vital Signs (24h Range):  Temp:  [96.8 °F (36 °C)-99.5 °F (37.5 °C)] 98.6 °F (37 °C)  Pulse:  [60-78] 60  Resp:  [17-19] 18  SpO2:  [91 %-100 %] 98 %  BP: (108-132)/(59-64) 132/61       Lines/Drains/Airways       Central Venous Catheter Line  Duration                  PowerPort A Cath Single Lumen 12/22/22 0912 right internal jugular 84 days              Drain  Duration                  Chest Tube 03/14/23 1021 Tube - 1 Right Midaxillary 24 Fr. 2 days         Closed/Suction Drain 03/14/23 1607 Left Neck Bulb 15 Fr. 2 days         Gastrostomy/Enterostomy 03/14/23 1658 Jejunostomy tube LUQ 2 days              Peripheral Intravenous Line  Duration                  Peripheral IV - Single Lumen 03/14/23 0540 20 G Left;Posterior Hand 3 days                    Physical Exam  NAD  Breathy voice  Left transcervical incision c/d/I, JOANN drain in place     Flexible Laryngoscopy 3/16/23      Nasal Cavity/Nasopharynx: Normal mucosa, inferior turbinates and septum. No evidence of septal deviation or perforation. There are no visible lesions. Bianca within normal limits.  Oropharynx: Pharyngeal wall without edema, lesions, or masses. Bilateral palatine tonsils within normal limits. Base of tongue symmetric without masses or lesions. Lingual  tonsil within normal limits.  Hypopharynx: No lesions or masses noted within the piriform sinuses or post cricoid area. No pooling of secretions.  Supraglottis: There is no edema of the arytenoids, interarytenoid space or epiglottis. Epiglottis is crisp. There are no masses or lesions noted. False vocal folds without masses or lesions.  Glottis: True vocal folds without masses or lesions. Normal mobility of right true vocal fold. Left true vocal fold in paramedian position and a mild sulcus along the middle thirds.      Inspiration       Phonation        Significant Labs:  BMP:   Recent Labs   Lab 03/15/23  0346   *      CO2 24   BUN 21   CREATININE 1.0   CALCIUM 8.3*   MG 2.0     CBC:   Recent Labs   Lab 03/15/23  0346   WBC 7.43   RBC 3.58*   HGB 11.0*   HCT 34.3*   *   MCV 96   MCH 30.7   MCHC 32.1       Significant Diagnostics:  I have reviewed all pertinent imaging results/findings within the past 24 hours.

## 2023-03-17 NOTE — SUBJECTIVE & OBJECTIVE
Interval History: No issues overnight, tolerated TF yesterday, have been paused for OR today with ENT. No nausea/vomiting. Pain well controlled.     Medications:  Continuous Infusions:  Scheduled Meds:   acetaminophen  499.5074 mg Per J Tube Q8H    allopurinoL  100 mg Per J Tube Daily    enoxaparin  40 mg Subcutaneous Daily    ketorolac  15 mg Intravenous Q6H    levalbuterol  0.63 mg Nebulization Q6H WAKE    methocarbamol (ROBAXIN) IVPB  500 mg Intravenous Q8H    metoprolol  12.5 mg Per J Tube QID    pantoprazole  40 mg Intravenous Daily    sennosides 8.8 mg/5 ml  5 mL Per J Tube QHS    tamsulosin  0.4 mg Oral Daily     PRN Meds:glucagon (human recombinant), insulin aspart U-100, ondansetron, oxyCODONE, oxyCODONE, sodium chloride 0.9%     Review of patient's allergies indicates:  No Known Allergies  Objective:     Vital Signs (Most Recent):  Temp: 98.6 °F (37 °C) (03/17/23 0341)  Pulse: 60 (03/17/23 0341)  Resp: 18 (03/17/23 0341)  BP: 132/61 (03/17/23 0341)  SpO2: 98 % (03/17/23 0341) Vital Signs (24h Range):  Temp:  [96.8 °F (36 °C)-99.5 °F (37.5 °C)] 98.6 °F (37 °C)  Pulse:  [60-78] 60  Resp:  [17-19] 18  SpO2:  [91 %-100 %] 98 %  BP: (108-132)/(59-64) 132/61     Weight: 111.6 kg (246 lb)  Body mass index is 33.36 kg/m².    Intake/Output - Last 3 Shifts         03/15 0700  03/16 0659 03/16 0700  03/17 0659 03/17 0700  03/18 0659    I.V. (mL/kg) 1104.3 (9.9)      NG/GT 60 650     IV Piggyback 97.5      Total Intake(mL/kg) 1261.7 (11.3) 650 (5.8)     Urine (mL/kg/hr) 985 (0.4) 925 (0.3)     Drains 70 5     Stool  0     Blood       Chest Tube 450 300     Total Output 1505 1230     Net -243.3 -580            Stool Occurrence  0 x             Physical Exam  Vitals and nursing note reviewed.   Constitutional:       General: He is not in acute distress.  Cardiovascular:      Rate and Rhythm: Normal rate.      Pulses: Normal pulses.      Comments: JOANN drain with serosanginous output  Pulmonary:      Effort: Pulmonary  effort is normal. No respiratory distress.      Comments: CT to WS, no air leak  Abdominal:      General: There is no distension.      Palpations: Abdomen is soft.      Tenderness: There is no abdominal tenderness.      Comments: Incisions clean/dry with no erythema or drainage   Neurological:      Mental Status: He is alert.       Significant Labs:  I have reviewed all pertinent lab results within the past 24 hours.  CBC:   Recent Labs   Lab 03/15/23  0346   WBC 7.43   RBC 3.58*   HGB 11.0*   HCT 34.3*   *   MCV 96   MCH 30.7   MCHC 32.1     BMP:   Recent Labs   Lab 03/15/23  0346   *      K 4.6      CO2 24   BUN 21   CREATININE 1.0   CALCIUM 8.3*   MG 2.0     CMP:   Recent Labs   Lab 03/15/23  0346   *   CALCIUM 8.3*   ALBUMIN 3.4*   PROT 6.2      K 4.6   CO2 24      BUN 21   CREATININE 1.0   ALKPHOS 67   *   *   BILITOT 0.5       Significant Diagnostics:  I have reviewed all pertinent imaging results/findings within the past 24 hours.

## 2023-03-17 NOTE — ANESTHESIA POSTPROCEDURE EVALUATION
Anesthesia Post Evaluation    Patient: Lukasz Person    Procedure(s) Performed: Procedure(s) (LRB):  INJECTION, VOCAL CORD, LARYNGOSCOPIC (Left)    Final Anesthesia Type: general      Patient location during evaluation: PACU  Patient participation: Yes- Able to Participate  Level of consciousness: awake and alert  Post-procedure vital signs: reviewed and stable  Pain management: adequate  Airway patency: patent    PONV status at discharge: No PONV  Anesthetic complications: no      Cardiovascular status: blood pressure returned to baseline  Respiratory status: unassisted  Follow-up not needed.          Vitals Value Taken Time   /66 03/17/23 1602   Temp 36.8 °C (98.2 °F) 03/17/23 1443   Pulse 69 03/17/23 1605   Resp 14 03/17/23 1605   SpO2 97 % 03/17/23 1605   Vitals shown include unvalidated device data.      Event Time   Out of Recovery 03/17/2023 15:00:00         Pain/Jeovanny Score: Pain Rating Prior to Med Admin: 5 (3/17/2023  3:54 PM)  Jeovanny Score: 9 (3/17/2023  3:00 PM)

## 2023-03-17 NOTE — PLAN OF CARE
Problem: Occupational Therapy  Goal: Occupational Therapy Goal  Description: Goals to be met by: 3/29/23     Patient will increase functional independence with ADLs by performing:    Feeding with Supervision.  UE Dressing with Supervision.  LE Dressing with Supervision.  Grooming while standing with Supervision.  Toileting from toilet with Supervision for hygiene and clothing management.     Outcome: Ongoing, Progressing     Goals remain appropriate. Cont POC.

## 2023-03-17 NOTE — BRIEF OP NOTE
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 EBL = 1 cc.         Specimens:   Specimen (24h ago, onward)      None            [id number]

## 2023-03-17 NOTE — PROGRESS NOTES
[facility] - [facility]  Otorhinolaryngology-Head & Neck Surgery  Progress Note    Subjective:     Post-Op Info:  Procedure(s) (LRB):  XI ROBOTIC ESOPHAGECTOMY (N/A)  ROBOTIC JEJUNOSTOMY TUBE INSERTION (N/A)   3 Days Post-Op  Hospital Day: 4     Interval History: NAEON.     Medications:  Continuous Infusions:  Scheduled Meds:   acetaminophen  499.5074 mg Per J Tube Q8H    allopurinoL  100 mg Per J Tube Daily    enoxaparin  40 mg Subcutaneous Daily    ketorolac  15 mg Intravenous Q6H    levalbuterol  0.63 mg Nebulization Q6H WAKE    methocarbamol (ROBAXIN) IVPB  500 mg Intravenous Q8H    metoprolol  12.5 mg Per J Tube QID    pantoprazole  40 mg Intravenous Daily    sennosides 8.8 mg/5 ml  5 mL Per J Tube QHS    tamsulosin  0.4 mg Oral Daily     PRN Meds:glucagon (human recombinant), insulin aspart U-100, ondansetron, oxyCODONE, oxyCODONE, sodium chloride 0.9%     Review of patient's allergies indicates:  No Known Allergies  Objective:     Vital Signs (24h Range):  Temp:  [96.8 °F (36 °C)-99.5 °F (37.5 °C)] 98.6 °F (37 °C)  Pulse:  [60-78] 60  Resp:  [17-19] 18  SpO2:  [91 %-100 %] 98 %  BP: (108-132)/(59-64) 132/61       Lines/Drains/Airways       Central Venous Catheter Line  Duration                  PowerPort A Cath Single Lumen 12/22/22 0912 right internal jugular 84 days              Drain  Duration                  Chest Tube 03/14/23 1021 Tube - 1 Right Midaxillary 24 Fr. 2 days         Closed/Suction Drain 03/14/23 1607 Left Neck Bulb 15 Fr. 2 days         Gastrostomy/Enterostomy 03/14/23 1658 Jejunostomy tube LUQ 2 days              Peripheral Intravenous Line  Duration                  Peripheral IV - Single Lumen 03/14/23 0540 20 G Left;Posterior Hand 3 days                    Physical Exam  NAD  Breathy voice  Left transcervical incision c/d/I, JOANN drain in place     Flexible Laryngoscopy 3/16/23      Nasal Cavity/Nasopharynx: Normal mucosa, inferior turbinates and septum. No evidence of septal  deviation or perforation. There are no visible lesions. Bianca within normal limits.  Oropharynx: Pharyngeal wall without edema, lesions, or masses. Bilateral palatine tonsils within normal limits. Base of tongue symmetric without masses or lesions. Lingual tonsil within normal limits.  Hypopharynx: No lesions or masses noted within the piriform sinuses or post cricoid area. No pooling of secretions.  Supraglottis: There is no edema of the arytenoids, interarytenoid space or epiglottis. Epiglottis is crisp. There are no masses or lesions noted. False vocal folds without masses or lesions.  Glottis: True vocal folds without masses or lesions. Normal mobility of right true vocal fold. Left true vocal fold in paramedian position and a mild sulcus along the middle thirds.      Inspiration       Phonation        Significant Labs:  BMP:   Recent Labs   Lab 03/15/23  0346   *      CO2 24   BUN 21   CREATININE 1.0   CALCIUM 8.3*   MG 2.0     CBC:   Recent Labs   Lab 03/15/23  0346   WBC 7.43   RBC 3.58*   HGB 11.0*   HCT 34.3*   *   MCV 96   MCH 30.7   MCHC 32.1       Significant Diagnostics:  I have reviewed all pertinent imaging results/findings within the past 24 hours.    Assessment/Plan:     Left true vocal fold immobility and bowing  67 year-old male with T3N0M0 adenocarcinoma of the distal esophagus s/p robotic esophagectomy and a left transcervical approach on 3/14. ENT consulted for persistent dysphonia. Flexible laryngoscopy shows a left true vocal fold immobility. There is also a glottic gap with maximal phonation. Primary team requesting medialization prior to discharge.     -- To OR today with Dr. Altman for direct laryngoscopy and injection augmentation of left true vocal fold   -- Risks and benefits discussed with patient and wife at bedside; consent signed; witnessed by SHABBIR HERNANDEZ  -- NPO; hold tubefeeding  -- Call with questions or concerns        Prieto Dunn MD  Otorhinolaryngology-Head  & Neck Surgery  [facility]

## 2023-03-18 LAB
ALBUMIN SERPL BCP-MCNC: 2.8 G/DL (ref 3.5–5.2)
ALP SERPL-CCNC: 94 U/L (ref 55–135)
ALT SERPL W/O P-5'-P-CCNC: 121 U/L (ref 10–44)
ANION GAP SERPL CALC-SCNC: 7 MMOL/L (ref 8–16)
AST SERPL-CCNC: 37 U/L (ref 10–40)
BASOPHILS # BLD AUTO: 0.02 K/UL (ref 0–0.2)
BASOPHILS NFR BLD: 0.5 % (ref 0–1.9)
BILIRUB SERPL-MCNC: 0.5 MG/DL (ref 0.1–1)
BLD PROD TYP BPU: NORMAL
BLD PROD TYP BPU: NORMAL
BLOOD UNIT EXPIRATION DATE: NORMAL
BLOOD UNIT EXPIRATION DATE: NORMAL
BLOOD UNIT TYPE CODE: 5100
BLOOD UNIT TYPE CODE: 5100
BLOOD UNIT TYPE: NORMAL
BLOOD UNIT TYPE: NORMAL
BUN SERPL-MCNC: 19 MG/DL (ref 8–23)
CALCIUM SERPL-MCNC: 8.6 MG/DL (ref 8.7–10.5)
CHLORIDE SERPL-SCNC: 105 MMOL/L (ref 95–110)
CO2 SERPL-SCNC: 27 MMOL/L (ref 23–29)
CODING SYSTEM: NORMAL
CODING SYSTEM: NORMAL
CREAT SERPL-MCNC: 1 MG/DL (ref 0.5–1.4)
CROSSMATCH INTERPRETATION: NORMAL
CROSSMATCH INTERPRETATION: NORMAL
CRP SERPL-MCNC: 50.45 MG/L (ref 0–3.19)
DIFFERENTIAL METHOD: ABNORMAL
DISPENSE STATUS: NORMAL
DISPENSE STATUS: NORMAL
EOSINOPHIL # BLD AUTO: 0.2 K/UL (ref 0–0.5)
EOSINOPHIL NFR BLD: 5.6 % (ref 0–8)
ERYTHROCYTE [DISTWIDTH] IN BLOOD BY AUTOMATED COUNT: 17.1 % (ref 11.5–14.5)
EST. GFR  (NO RACE VARIABLE): >60 ML/MIN/1.73 M^2
GLUCOSE SERPL-MCNC: 144 MG/DL (ref 70–110)
HCT VFR BLD AUTO: 32 % (ref 40–54)
HGB BLD-MCNC: 10.2 G/DL (ref 14–18)
IMM GRANULOCYTES # BLD AUTO: 0.04 K/UL (ref 0–0.04)
IMM GRANULOCYTES NFR BLD AUTO: 1.1 % (ref 0–0.5)
LYMPHOCYTES # BLD AUTO: 0.3 K/UL (ref 1–4.8)
LYMPHOCYTES NFR BLD: 7.4 % (ref 18–48)
MAGNESIUM SERPL-MCNC: 2.2 MG/DL (ref 1.6–2.6)
MCH RBC QN AUTO: 31.3 PG (ref 27–31)
MCHC RBC AUTO-ENTMCNC: 31.9 G/DL (ref 32–36)
MCV RBC AUTO: 98 FL (ref 82–98)
MONOCYTES # BLD AUTO: 0.5 K/UL (ref 0.3–1)
MONOCYTES NFR BLD: 13.6 % (ref 4–15)
NEUTROPHILS # BLD AUTO: 2.7 K/UL (ref 1.8–7.7)
NEUTROPHILS NFR BLD: 71.8 % (ref 38–73)
NRBC BLD-RTO: 1 /100 WBC
NUM UNITS TRANS PACKED RBC: NORMAL
NUM UNITS TRANS PACKED RBC: NORMAL
PHOSPHATE SERPL-MCNC: 3.3 MG/DL (ref 2.7–4.5)
PLATELET # BLD AUTO: 108 K/UL (ref 150–450)
PMV BLD AUTO: 10.3 FL (ref 9.2–12.9)
POCT GLUCOSE: 129 MG/DL (ref 70–110)
POCT GLUCOSE: 132 MG/DL (ref 70–110)
POCT GLUCOSE: 141 MG/DL (ref 70–110)
POCT GLUCOSE: 144 MG/DL (ref 70–110)
POCT GLUCOSE: 155 MG/DL (ref 70–110)
POTASSIUM SERPL-SCNC: 3.8 MMOL/L (ref 3.5–5.1)
PROT SERPL-MCNC: 6 G/DL (ref 6–8.4)
RBC # BLD AUTO: 3.26 M/UL (ref 4.6–6.2)
SODIUM SERPL-SCNC: 139 MMOL/L (ref 136–145)
WBC # BLD AUTO: 3.76 K/UL (ref 3.9–12.7)

## 2023-03-18 PROCEDURE — 20600001 HC STEP DOWN PRIVATE ROOM

## 2023-03-18 PROCEDURE — 25000003 PHARM REV CODE 250

## 2023-03-18 PROCEDURE — 85025 COMPLETE CBC W/AUTO DIFF WBC: CPT | Performed by: STUDENT IN AN ORGANIZED HEALTH CARE EDUCATION/TRAINING PROGRAM

## 2023-03-18 PROCEDURE — 25000003 PHARM REV CODE 250: Performed by: STUDENT IN AN ORGANIZED HEALTH CARE EDUCATION/TRAINING PROGRAM

## 2023-03-18 PROCEDURE — 36415 COLL VENOUS BLD VENIPUNCTURE: CPT | Performed by: STUDENT IN AN ORGANIZED HEALTH CARE EDUCATION/TRAINING PROGRAM

## 2023-03-18 PROCEDURE — 25000242 PHARM REV CODE 250 ALT 637 W/ HCPCS: Performed by: STUDENT IN AN ORGANIZED HEALTH CARE EDUCATION/TRAINING PROGRAM

## 2023-03-18 PROCEDURE — 80053 COMPREHEN METABOLIC PANEL: CPT | Performed by: STUDENT IN AN ORGANIZED HEALTH CARE EDUCATION/TRAINING PROGRAM

## 2023-03-18 PROCEDURE — 84100 ASSAY OF PHOSPHORUS: CPT | Performed by: STUDENT IN AN ORGANIZED HEALTH CARE EDUCATION/TRAINING PROGRAM

## 2023-03-18 PROCEDURE — 83735 ASSAY OF MAGNESIUM: CPT | Performed by: STUDENT IN AN ORGANIZED HEALTH CARE EDUCATION/TRAINING PROGRAM

## 2023-03-18 PROCEDURE — 94664 DEMO&/EVAL PT USE INHALER: CPT

## 2023-03-18 PROCEDURE — 94640 AIRWAY INHALATION TREATMENT: CPT

## 2023-03-18 PROCEDURE — C9113 INJ PANTOPRAZOLE SODIUM, VIA: HCPCS | Performed by: STUDENT IN AN ORGANIZED HEALTH CARE EDUCATION/TRAINING PROGRAM

## 2023-03-18 PROCEDURE — 27000221 HC OXYGEN, UP TO 24 HOURS

## 2023-03-18 PROCEDURE — 25000242 PHARM REV CODE 250 ALT 637 W/ HCPCS

## 2023-03-18 PROCEDURE — 63600175 PHARM REV CODE 636 W HCPCS

## 2023-03-18 PROCEDURE — 94799 UNLISTED PULMONARY SVC/PX: CPT

## 2023-03-18 PROCEDURE — 99900035 HC TECH TIME PER 15 MIN (STAT)

## 2023-03-18 PROCEDURE — 86141 C-REACTIVE PROTEIN HS: CPT | Performed by: STUDENT IN AN ORGANIZED HEALTH CARE EDUCATION/TRAINING PROGRAM

## 2023-03-18 PROCEDURE — 94761 N-INVAS EAR/PLS OXIMETRY MLT: CPT

## 2023-03-18 PROCEDURE — 63600175 PHARM REV CODE 636 W HCPCS: Performed by: STUDENT IN AN ORGANIZED HEALTH CARE EDUCATION/TRAINING PROGRAM

## 2023-03-18 RX ORDER — ACETAMINOPHEN 650 MG/20.3ML
650 LIQUID ORAL EVERY 6 HOURS PRN
Status: DISCONTINUED | OUTPATIENT
Start: 2023-03-18 | End: 2023-03-20

## 2023-03-18 RX ORDER — BISACODYL 10 MG
10 SUPPOSITORY, RECTAL RECTAL DAILY
Status: DISCONTINUED | OUTPATIENT
Start: 2023-03-18 | End: 2023-03-20 | Stop reason: HOSPADM

## 2023-03-18 RX ADMIN — LEVALBUTEROL HYDROCHLORIDE 0.63 MG: 0.63 SOLUTION RESPIRATORY (INHALATION) at 12:03

## 2023-03-18 RX ADMIN — KETOROLAC TROMETHAMINE 15 MG: 15 INJECTION, SOLUTION INTRAMUSCULAR; INTRAVENOUS at 01:03

## 2023-03-18 RX ADMIN — METOPROLOL TARTRATE 12.5 MG: 25 TABLET, FILM COATED ORAL at 09:03

## 2023-03-18 RX ADMIN — OXYCODONE HYDROCHLORIDE 5 MG: 5 SOLUTION ORAL at 12:03

## 2023-03-18 RX ADMIN — ENOXAPARIN SODIUM 40 MG: 40 INJECTION SUBCUTANEOUS at 05:03

## 2023-03-18 RX ADMIN — METOPROLOL TARTRATE 12.5 MG: 25 TABLET, FILM COATED ORAL at 05:03

## 2023-03-18 RX ADMIN — METOPROLOL TARTRATE 12.5 MG: 25 TABLET, FILM COATED ORAL at 08:03

## 2023-03-18 RX ADMIN — LEVALBUTEROL HYDROCHLORIDE 0.63 MG: 0.63 SOLUTION RESPIRATORY (INHALATION) at 07:03

## 2023-03-18 RX ADMIN — METOPROLOL TARTRATE 12.5 MG: 25 TABLET, FILM COATED ORAL at 12:03

## 2023-03-18 RX ADMIN — BISACODYL 10 MG: 10 SUPPOSITORY RECTAL at 11:03

## 2023-03-18 RX ADMIN — SENNOSIDES 5 ML: 8.8 SYRUP ORAL at 09:03

## 2023-03-18 RX ADMIN — PANTOPRAZOLE SODIUM 40 MG: 40 INJECTION, POWDER, FOR SOLUTION INTRAVENOUS at 08:03

## 2023-03-18 RX ADMIN — ACETAMINOPHEN 650 MG: 650 SOLUTION ORAL at 09:03

## 2023-03-18 NOTE — ASSESSMENT & PLAN NOTE
67 year-old male with T3N0M0 adenocarcinoma of the distal esophagus s/p robotic esophagectomy and a left transcervical approach on 3/14. ENT consulted for persistent dysphonia. Flexible laryngoscopy shows a left true vocal fold immobility. Patient is now s/p injection augmentation 3/17.    - Patient should follow up with Dr. Valdivia in 2-3 months  - Remainder of care per primary team

## 2023-03-18 NOTE — PROGRESS NOTES
[facility] - [facility]  Otorhinolaryngology-Head & Neck Surgery  Progress Note    Subjective:     Post-Op Info:  Procedure(s) (LRB):  INJECTION, VOCAL CORD, LARYNGOSCOPIC (Left)   1 Day Post-Op  Hospital Day: 5     Interval History: Patient happy with his new ability to cough. His voice is mildly strained, as expected.    Medications:  Continuous Infusions:  Scheduled Meds:   acetaminophen  499.5074 mg Per J Tube Q8H    allopurinoL  100 mg Per J Tube Daily    bisacodyL  10 mg Rectal Daily    enoxaparin  40 mg Subcutaneous Daily    ketorolac  15 mg Intravenous Q6H    levalbuterol  0.63 mg Nebulization Q6H WAKE    metoprolol  12.5 mg Per J Tube QID    pantoprazole  40 mg Intravenous Daily    sennosides 8.8 mg/5 ml  5 mL Per J Tube QHS    tamsulosin  0.4 mg Oral Daily     PRN Meds:glucagon (human recombinant), insulin aspart U-100, ondansetron, oxyCODONE, oxyCODONE, sodium chloride 0.9%     Review of patient's allergies indicates:  No Known Allergies  Objective:     Vital Signs (24h Range):  Temp:  [97.8 °F (36.6 °C)-99 °F (37.2 °C)] 97.9 °F (36.6 °C)  Pulse:  [56-80] 59  Resp:  [12-23] 18  SpO2:  [96 %-100 %] 98 %  BP: (125-169)/(61-72) 147/67       Lines/Drains/Airways       Central Venous Catheter Line  Duration                  PowerPort A Cath Single Lumen 12/22/22 0912 right internal jugular 86 days              Drain  Duration                  Chest Tube 03/14/23 1021 Tube - 1 Right Midaxillary 24 Fr. 4 days         Closed/Suction Drain 03/14/23 1607 Left Neck Bulb 15 Fr. 3 days         Gastrostomy/Enterostomy 03/14/23 1658 Jejunostomy tube LUQ 3 days              Peripheral Intravenous Line  Duration                  Peripheral IV - Single Lumen 03/17/23 1530 22 G Anterior;Distal;Left Upper Arm <1 day                    Physical Exam  Voice mildly strained  Strong cough  No stridor    Significant Labs:  All pertinent labs from the last 24 hours have been reviewed.    Significant Diagnostics:  I have  reviewed and interpreted all pertinent imaging results/findings within the past 24 hours.    Assessment/Plan:     Left true vocal fold immobility and bowing  67 year-old male with T3N0M0 adenocarcinoma of the distal esophagus s/p robotic esophagectomy and a left transcervical approach on 3/14. ENT consulted for persistent dysphonia. Flexible laryngoscopy shows a left true vocal fold immobility. Patient is now s/p injection augmentation 3/17.    - Patient should follow up with Dr. Valdivia in 2-3 months  - Remainder of care per primary team          Laine Gamez MD  Otorhinolaryngology-Head & Neck Surgery  [facility]

## 2023-03-18 NOTE — SUBJECTIVE & OBJECTIVE
Interval History: NAEON, AF, HDS. Tolerated TF yesterday. No nausea/vomiting. Denies diarrhea. Pain well controlled. Neck drain with minimal SS output. No other complaints at the time.    Medications:  Continuous Infusions:  Scheduled Meds:   acetaminophen  499.5074 mg Per J Tube Q8H    allopurinoL  100 mg Per J Tube Daily    bisacodyL  10 mg Rectal Daily    enoxaparin  40 mg Subcutaneous Daily    ketorolac  15 mg Intravenous Q6H    levalbuterol  0.63 mg Nebulization Q6H WAKE    metoprolol  12.5 mg Per J Tube QID    pantoprazole  40 mg Intravenous Daily    sennosides 8.8 mg/5 ml  5 mL Per J Tube QHS    tamsulosin  0.4 mg Oral Daily     PRN Meds:glucagon (human recombinant), insulin aspart U-100, ondansetron, oxyCODONE, oxyCODONE, sodium chloride 0.9%     Review of patient's allergies indicates:  No Known Allergies  Objective:     Vital Signs (Most Recent):  Temp: 97.9 °F (36.6 °C) (03/18/23 0747)  Pulse: (!) 59 (03/18/23 0747)  Resp: 18 (03/18/23 0747)  BP: (!) 147/67 (03/18/23 0747)  SpO2: 98 % (03/18/23 0747) Vital Signs (24h Range):  Temp:  [97.8 °F (36.6 °C)-99 °F (37.2 °C)] 97.9 °F (36.6 °C)  Pulse:  [56-80] 59  Resp:  [12-23] 18  SpO2:  [96 %-100 %] 98 %  BP: (125-169)/(61-72) 147/67     Weight: 111.6 kg (246 lb)  Body mass index is 33.36 kg/m².    Intake/Output - Last 3 Shifts         03/16 0700 03/17 0659 03/17 0700 03/18 0659 03/18 0700 03/19 0659    I.V. (mL/kg)       NG/ 110     IV Piggyback  300     Total Intake(mL/kg) 650 (5.8) 410 (3.7)     Urine (mL/kg/hr) 925 (0.3) 1450 (0.5)     Drains 5 5     Stool 0 0     Chest Tube 300 80     Total Output 1230 1535     Net -580 -1125            Stool Occurrence 0 x 0 x             Physical Exam  Vitals and nursing note reviewed.   Constitutional:       General: He is not in acute distress.  Cardiovascular:      Rate and Rhythm: Normal rate.      Pulses: Normal pulses.      Comments: JOANN drain with serosanginous output  Pulmonary:      Effort: Pulmonary  effort is normal. No respiratory distress.      Comments: CT to WS, no air leak  Abdominal:      General: There is no distension.      Palpations: Abdomen is soft.      Tenderness: There is no abdominal tenderness.      Comments: Incisions clean/dry with no erythema or drainage   Neurological:      Mental Status: He is alert.       Significant Labs:  I have reviewed all pertinent lab results within the past 24 hours.  CBC:   Recent Labs   Lab 03/18/23  0547   WBC 3.76*   RBC 3.26*   HGB 10.2*   HCT 32.0*   *   MCV 98   MCH 31.3*   MCHC 31.9*       BMP:   Recent Labs   Lab 03/18/23  0547   *      K 3.8      CO2 27   BUN 19   CREATININE 1.0   CALCIUM 8.6*   MG 2.2       CMP:   Recent Labs   Lab 03/18/23  0547   *   CALCIUM 8.6*   ALBUMIN 2.8*   PROT 6.0      K 3.8   CO2 27      BUN 19   CREATININE 1.0   ALKPHOS 94   *   AST 37   BILITOT 0.5         Significant Diagnostics:  I have reviewed all pertinent imaging results/findings within the past 24 hours.

## 2023-03-18 NOTE — SUBJECTIVE & OBJECTIVE
Interval History: Patient happy with his new ability to cough. His voice is mildly strained, as expected.    Medications:  Continuous Infusions:  Scheduled Meds:   acetaminophen  499.5074 mg Per J Tube Q8H    allopurinoL  100 mg Per J Tube Daily    bisacodyL  10 mg Rectal Daily    enoxaparin  40 mg Subcutaneous Daily    ketorolac  15 mg Intravenous Q6H    levalbuterol  0.63 mg Nebulization Q6H WAKE    metoprolol  12.5 mg Per J Tube QID    pantoprazole  40 mg Intravenous Daily    sennosides 8.8 mg/5 ml  5 mL Per J Tube QHS    tamsulosin  0.4 mg Oral Daily     PRN Meds:glucagon (human recombinant), insulin aspart U-100, ondansetron, oxyCODONE, oxyCODONE, sodium chloride 0.9%     Review of patient's allergies indicates:  No Known Allergies  Objective:     Vital Signs (24h Range):  Temp:  [97.8 °F (36.6 °C)-99 °F (37.2 °C)] 97.9 °F (36.6 °C)  Pulse:  [56-80] 59  Resp:  [12-23] 18  SpO2:  [96 %-100 %] 98 %  BP: (125-169)/(61-72) 147/67       Lines/Drains/Airways       Central Venous Catheter Line  Duration                  PowerPort A Cath Single Lumen 12/22/22 0912 right internal jugular 86 days              Drain  Duration                  Chest Tube 03/14/23 1021 Tube - 1 Right Midaxillary 24 Fr. 4 days         Closed/Suction Drain 03/14/23 1607 Left Neck Bulb 15 Fr. 3 days         Gastrostomy/Enterostomy 03/14/23 1658 Jejunostomy tube LUQ 3 days              Peripheral Intravenous Line  Duration                  Peripheral IV - Single Lumen 03/17/23 1530 22 G Anterior;Distal;Left Upper Arm <1 day                    Physical Exam  Voice mildly strained  Strong cough  No stridor    Significant Labs:  All pertinent labs from the last 24 hours have been reviewed.    Significant Diagnostics:  I have reviewed and interpreted all pertinent imaging results/findings within the past 24 hours.

## 2023-03-18 NOTE — PLAN OF CARE
Patient AAO sitting quietly in bed with respiratory unlabored. VSS. Pain manage with scheduled pain medication. Patient states no pain at this time. Ambulates with assist. Surgical incision to neck with dermabond. JOANN drain to upper L chest with minimal drainage. J tube infusing tube feeds @ 20Ml. Patient states no nausea/vomiting. Adequate urine output, but no BM's or passing gas. Bed in lowest position, wheels locked, with call light at bedside. No distress noted.     Problem: Adult Inpatient Plan of Care  Goal: Plan of Care Review  Outcome: Ongoing, Progressing  Goal: Patient-Specific Goal (Individualized)  Outcome: Ongoing, Progressing  Goal: Absence of Hospital-Acquired Illness or Injury  Outcome: Ongoing, Progressing  Goal: Optimal Comfort and Wellbeing  Outcome: Ongoing, Progressing  Goal: Readiness for Transition of Care  Outcome: Ongoing, Progressing     Problem: Diabetes Comorbidity  Goal: Blood Glucose Level Within Targeted Range  Outcome: Ongoing, Progressing     Problem: Infection  Goal: Absence of Infection Signs and Symptoms  Outcome: Ongoing, Progressing     Problem: Fall Injury Risk  Goal: Absence of Fall and Fall-Related Injury  Outcome: Ongoing, Progressing     Problem: Skin Injury Risk Increased  Goal: Skin Health and Integrity  Outcome: Ongoing, Progressing

## 2023-03-18 NOTE — PROGRESS NOTES
Karl Gaona - [facility]  General Surgery  Progress Note    Subjective:     History of Present Illness:  No notes on file    Post-Op Info:  Procedure(s) (LRB):  INJECTION, VOCAL CORD, LARYNGOSCOPIC (Left)   1 Day Post-Op     Interval History: NAEON, AF, HDS. Tolerated TF yesterday. No nausea/vomiting. Denies diarrhea. Pain well controlled. Neck drain with minimal SS output. No other complaints at the time.    Medications:  Continuous Infusions:  Scheduled Meds:   acetaminophen  499.5074 mg Per J Tube Q8H    allopurinoL  100 mg Per J Tube Daily    bisacodyL  10 mg Rectal Daily    enoxaparin  40 mg Subcutaneous Daily    ketorolac  15 mg Intravenous Q6H    levalbuterol  0.63 mg Nebulization Q6H WAKE    metoprolol  12.5 mg Per J Tube QID    pantoprazole  40 mg Intravenous Daily    sennosides 8.8 mg/5 ml  5 mL Per J Tube QHS    tamsulosin  0.4 mg Oral Daily     PRN Meds:glucagon (human recombinant), insulin aspart U-100, ondansetron, oxyCODONE, oxyCODONE, sodium chloride 0.9%     Review of patient's allergies indicates:  No Known Allergies  Objective:     Vital Signs (Most Recent):  Temp: 97.9 °F (36.6 °C) (03/18/23 0747)  Pulse: (!) 59 (03/18/23 0747)  Resp: 18 (03/18/23 0747)  BP: (!) 147/67 (03/18/23 0747)  SpO2: 98 % (03/18/23 0747) Vital Signs (24h Range):  Temp:  [97.8 °F (36.6 °C)-99 °F (37.2 °C)] 97.9 °F (36.6 °C)  Pulse:  [56-80] 59  Resp:  [12-23] 18  SpO2:  [96 %-100 %] 98 %  BP: (125-169)/(61-72) 147/67     Weight: 111.6 kg (246 lb)  Body mass index is 33.36 kg/m².    Intake/Output - Last 3 Shifts         03/16 0700 03/17 0659 03/17 0700 03/18 0659 03/18 0700 03/19 0659    I.V. (mL/kg)       NG/ 110     IV Piggyback  300     Total Intake(mL/kg) 650 (5.8) 410 (3.7)     Urine (mL/kg/hr) 925 (0.3) 1450 (0.5)     Drains 5 5     Stool 0 0     Chest Tube 300 80     Total Output 1230 1535     Net -580 -1125            Stool Occurrence 0 x 0 x             Physical Exam  Vitals and nursing note reviewed.    Constitutional:       General: He is not in acute distress.  Cardiovascular:      Rate and Rhythm: Normal rate.      Pulses: Normal pulses.      Comments: JOANN drain with serosanginous output  Pulmonary:      Effort: Pulmonary effort is normal. No respiratory distress.      Comments: CT to WS, no air leak  Abdominal:      General: There is no distension.      Palpations: Abdomen is soft.      Tenderness: There is no abdominal tenderness.      Comments: Incisions clean/dry with no erythema or drainage   Neurological:      Mental Status: He is alert.       Significant Labs:  I have reviewed all pertinent lab results within the past 24 hours.  CBC:   Recent Labs   Lab 03/18/23  0547   WBC 3.76*   RBC 3.26*   HGB 10.2*   HCT 32.0*   *   MCV 98   MCH 31.3*   MCHC 31.9*       BMP:   Recent Labs   Lab 03/18/23  0547   *      K 3.8      CO2 27   BUN 19   CREATININE 1.0   CALCIUM 8.6*   MG 2.2       CMP:   Recent Labs   Lab 03/18/23  0547   *   CALCIUM 8.6*   ALBUMIN 2.8*   PROT 6.0      K 3.8   CO2 27      BUN 19   CREATININE 1.0   ALKPHOS 94   *   AST 37   BILITOT 0.5         Significant Diagnostics:  I have reviewed all pertinent imaging results/findings within the past 24 hours.    Assessment/Plan:     * Esophageal adenocarcinoma  Lukasz Person is a 67 y.o. with PMHx of HTN, T2DM, KUMAR on CPAP, CAD, CKD3, A fib, HFrEF s/p INJECTION, VOCAL CORD, LARYNGOSCOPIC (Left) 1 Day Post-Op, doing well.     - NPO  - TF will advance to 30cc/hr [employer] 1.4 + FWF 200cc Q6  - Possible CT removal today  - am labs in process, will follow-up  - pain meds via J tube  - beta blocker via J tube  - liquid senna via J tube  - suppository  - PRN nausea meds  - Replace electrolytes PRN  - DVT prophylaxis   - OOBTC/Ambulate  - aggressive pulmonary toilet  - IS  - PT/OT    Dispo: SHABBIR Parks MD  General Surgery  Karl Gaona - SHABBIR

## 2023-03-18 NOTE — ASSESSMENT & PLAN NOTE
Lukasz Person is a 67 y.o. with PMHx of HTN, T2DM, KUMAR on CPAP, CAD, CKD3, A fib, HFrEF s/p INJECTION, VOCAL CORD, LARYNGOSCOPIC (Left) 1 Day Post-Op, doing well.     - NPO  - TF will advance to 30cc/hr [employer] 1.4 + FWF 200cc Q6  - Possible CT removal today  - am labs in process, will follow-up  - pain meds via J tube  - beta blocker via J tube  - liquid senna via J tube  - suppository  - PRN nausea meds  - Replace electrolytes PRN  - DVT prophylaxis   - OOBTC/Ambulate  - aggressive pulmonary toilet  - IS  - PT/OT    Dispo: SHABBIR

## 2023-03-18 NOTE — PLAN OF CARE
J-tube and feeding education done with spouse today. Verbalizes and demonstrates understanding of med administration and feeding through J tube.     Problem: Adult Inpatient Plan of Care  Goal: Plan of Care Review  Outcome: Ongoing, Progressing  Goal: Patient-Specific Goal (Individualized)  Outcome: Ongoing, Progressing  Goal: Absence of Hospital-Acquired Illness or Injury  Outcome: Ongoing, Progressing  Goal: Optimal Comfort and Wellbeing  Outcome: Ongoing, Progressing  Goal: Readiness for Transition of Care  Outcome: Ongoing, Progressing     Problem: Diabetes Comorbidity  Goal: Blood Glucose Level Within Targeted Range  Outcome: Ongoing, Progressing     Problem: Infection  Goal: Absence of Infection Signs and Symptoms  Outcome: Ongoing, Progressing     Problem: Fall Injury Risk  Goal: Absence of Fall and Fall-Related Injury  Outcome: Ongoing, Progressing     Problem: Skin Injury Risk Increased  Goal: Skin Health and Integrity  Outcome: Ongoing, Progressing

## 2023-03-18 NOTE — OP NOTE
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 man currently admitted [facility] status post total esophagectomy.  He was noted to have a left true vocal fold palsy.  Consultation was called to our service for injection laryngoplasty to assist with voice and productive cough.    He was apprised of the risks, benefits and alternatives to surgery.  In spite of the risk inherent to surgery,he provided informed consent for the aforementioned procedures.     PROCEDURE IN DETAIL:  The patient was taken to the operating room and placed on the operating table in the supine position.  General endotracheal anesthesia was induced by the anesthesia team.     The operating table was rotated 90° to the right.  The upper teeth protected with a mouth guard.  The Dedo laryngoscope was inserted through the mouth and advanced to the level of the larynx and suspended for Lewy suspension device.  The operating telescope was then brought into the field.  Next, 0.5 cc of Restylane was injected into the left true vocal fold beginning at the level of the vocal process and extending this anteriorly.  Once adequate medialization been achieved, the laryngoscope was removed and discarded.  He was then handed back to anesthesia.  He was awakened, extubated and transferred to recovery in satisfactory condition.    There were no intraoperative complications.  I was present for and participated in the entire procedure as dictated above.       ESTIMATED BLOOD LOSS:  Minimal    SPECIMENS:   Specimen (24h ago, onward)       None

## 2023-03-19 LAB
ALBUMIN SERPL BCP-MCNC: 3 G/DL (ref 3.5–5.2)
ALP SERPL-CCNC: 152 U/L (ref 55–135)
ALT SERPL W/O P-5'-P-CCNC: 94 U/L (ref 10–44)
ANION GAP SERPL CALC-SCNC: 10 MMOL/L (ref 8–16)
AST SERPL-CCNC: 34 U/L (ref 10–40)
BASOPHILS # BLD AUTO: 0.02 K/UL (ref 0–0.2)
BASOPHILS NFR BLD: 0.4 % (ref 0–1.9)
BILIRUB SERPL-MCNC: 0.5 MG/DL (ref 0.1–1)
BUN SERPL-MCNC: 15 MG/DL (ref 8–23)
CALCIUM SERPL-MCNC: 8.8 MG/DL (ref 8.7–10.5)
CHLORIDE SERPL-SCNC: 105 MMOL/L (ref 95–110)
CO2 SERPL-SCNC: 25 MMOL/L (ref 23–29)
CREAT SERPL-MCNC: 0.8 MG/DL (ref 0.5–1.4)
CRP SERPL-MCNC: 36.8 MG/L (ref 0–3.19)
DIFFERENTIAL METHOD: ABNORMAL
EOSINOPHIL # BLD AUTO: 0.2 K/UL (ref 0–0.5)
EOSINOPHIL NFR BLD: 4.7 % (ref 0–8)
ERYTHROCYTE [DISTWIDTH] IN BLOOD BY AUTOMATED COUNT: 16.7 % (ref 11.5–14.5)
EST. GFR  (NO RACE VARIABLE): >60 ML/MIN/1.73 M^2
GLUCOSE SERPL-MCNC: 136 MG/DL (ref 70–110)
HCT VFR BLD AUTO: 30.8 % (ref 40–54)
HGB BLD-MCNC: 9.9 G/DL (ref 14–18)
IMM GRANULOCYTES # BLD AUTO: 0.04 K/UL (ref 0–0.04)
IMM GRANULOCYTES NFR BLD AUTO: 0.9 % (ref 0–0.5)
LYMPHOCYTES # BLD AUTO: 0.4 K/UL (ref 1–4.8)
LYMPHOCYTES NFR BLD: 8.8 % (ref 18–48)
MAGNESIUM SERPL-MCNC: 2.2 MG/DL (ref 1.6–2.6)
MCH RBC QN AUTO: 31.3 PG (ref 27–31)
MCHC RBC AUTO-ENTMCNC: 32.1 G/DL (ref 32–36)
MCV RBC AUTO: 98 FL (ref 82–98)
MONOCYTES # BLD AUTO: 0.8 K/UL (ref 0.3–1)
MONOCYTES NFR BLD: 16.5 % (ref 4–15)
NEUTROPHILS # BLD AUTO: 3.2 K/UL (ref 1.8–7.7)
NEUTROPHILS NFR BLD: 68.7 % (ref 38–73)
NRBC BLD-RTO: 0 /100 WBC
PHOSPHATE SERPL-MCNC: 2.5 MG/DL (ref 2.7–4.5)
PLATELET # BLD AUTO: 126 K/UL (ref 150–450)
PMV BLD AUTO: 9.9 FL (ref 9.2–12.9)
POCT GLUCOSE: 145 MG/DL (ref 70–110)
POCT GLUCOSE: 157 MG/DL (ref 70–110)
POCT GLUCOSE: 158 MG/DL (ref 70–110)
POTASSIUM SERPL-SCNC: 3.9 MMOL/L (ref 3.5–5.1)
PROT SERPL-MCNC: 6.4 G/DL (ref 6–8.4)
RBC # BLD AUTO: 3.16 M/UL (ref 4.6–6.2)
SODIUM SERPL-SCNC: 140 MMOL/L (ref 136–145)
WBC # BLD AUTO: 4.68 K/UL (ref 3.9–12.7)

## 2023-03-19 PROCEDURE — 25000003 PHARM REV CODE 250: Performed by: NURSE PRACTITIONER

## 2023-03-19 PROCEDURE — 86141 C-REACTIVE PROTEIN HS: CPT | Performed by: STUDENT IN AN ORGANIZED HEALTH CARE EDUCATION/TRAINING PROGRAM

## 2023-03-19 PROCEDURE — 94664 DEMO&/EVAL PT USE INHALER: CPT

## 2023-03-19 PROCEDURE — 83735 ASSAY OF MAGNESIUM: CPT | Performed by: STUDENT IN AN ORGANIZED HEALTH CARE EDUCATION/TRAINING PROGRAM

## 2023-03-19 PROCEDURE — 36415 COLL VENOUS BLD VENIPUNCTURE: CPT | Performed by: STUDENT IN AN ORGANIZED HEALTH CARE EDUCATION/TRAINING PROGRAM

## 2023-03-19 PROCEDURE — 84100 ASSAY OF PHOSPHORUS: CPT | Performed by: STUDENT IN AN ORGANIZED HEALTH CARE EDUCATION/TRAINING PROGRAM

## 2023-03-19 PROCEDURE — 20600001 HC STEP DOWN PRIVATE ROOM

## 2023-03-19 PROCEDURE — 85025 COMPLETE CBC W/AUTO DIFF WBC: CPT | Performed by: STUDENT IN AN ORGANIZED HEALTH CARE EDUCATION/TRAINING PROGRAM

## 2023-03-19 PROCEDURE — 94640 AIRWAY INHALATION TREATMENT: CPT

## 2023-03-19 PROCEDURE — 25000003 PHARM REV CODE 250

## 2023-03-19 PROCEDURE — 99900035 HC TECH TIME PER 15 MIN (STAT)

## 2023-03-19 PROCEDURE — 25000003 PHARM REV CODE 250: Performed by: STUDENT IN AN ORGANIZED HEALTH CARE EDUCATION/TRAINING PROGRAM

## 2023-03-19 PROCEDURE — 25000242 PHARM REV CODE 250 ALT 637 W/ HCPCS: Performed by: STUDENT IN AN ORGANIZED HEALTH CARE EDUCATION/TRAINING PROGRAM

## 2023-03-19 PROCEDURE — 80053 COMPREHEN METABOLIC PANEL: CPT | Performed by: STUDENT IN AN ORGANIZED HEALTH CARE EDUCATION/TRAINING PROGRAM

## 2023-03-19 PROCEDURE — 63600175 PHARM REV CODE 636 W HCPCS: Performed by: STUDENT IN AN ORGANIZED HEALTH CARE EDUCATION/TRAINING PROGRAM

## 2023-03-19 PROCEDURE — 25000242 PHARM REV CODE 250 ALT 637 W/ HCPCS

## 2023-03-19 PROCEDURE — C9113 INJ PANTOPRAZOLE SODIUM, VIA: HCPCS | Performed by: STUDENT IN AN ORGANIZED HEALTH CARE EDUCATION/TRAINING PROGRAM

## 2023-03-19 RX ADMIN — PANTOPRAZOLE SODIUM 40 MG: 40 INJECTION, POWDER, FOR SOLUTION INTRAVENOUS at 09:03

## 2023-03-19 RX ADMIN — METOPROLOL TARTRATE 12.5 MG: 25 TABLET, FILM COATED ORAL at 09:03

## 2023-03-19 RX ADMIN — OXYCODONE HYDROCHLORIDE 10 MG: 5 SOLUTION ORAL at 10:03

## 2023-03-19 RX ADMIN — ENOXAPARIN SODIUM 40 MG: 40 INJECTION SUBCUTANEOUS at 05:03

## 2023-03-19 RX ADMIN — LEVALBUTEROL HYDROCHLORIDE 0.63 MG: 0.63 SOLUTION RESPIRATORY (INHALATION) at 02:03

## 2023-03-19 RX ADMIN — METOPROLOL TARTRATE 12.5 MG: 25 TABLET, FILM COATED ORAL at 10:03

## 2023-03-19 RX ADMIN — POTASSIUM PHOSPHATE, MONOBASIC AND POTASSIUM PHOSPHATE, DIBASIC 15 MMOL: 224; 236 INJECTION, SOLUTION, CONCENTRATE INTRAVENOUS at 11:03

## 2023-03-19 RX ADMIN — LEVALBUTEROL HYDROCHLORIDE 0.63 MG: 0.63 SOLUTION RESPIRATORY (INHALATION) at 08:03

## 2023-03-19 RX ADMIN — LEVALBUTEROL HYDROCHLORIDE 0.63 MG: 0.63 SOLUTION RESPIRATORY (INHALATION) at 07:03

## 2023-03-19 RX ADMIN — METOPROLOL TARTRATE 12.5 MG: 25 TABLET, FILM COATED ORAL at 05:03

## 2023-03-19 RX ADMIN — ALLOPURINOL 100 MG: 100 TABLET ORAL at 08:03

## 2023-03-19 RX ADMIN — SENNOSIDES 5 ML: 8.8 SYRUP ORAL at 10:03

## 2023-03-19 RX ADMIN — METOPROLOL TARTRATE 12.5 MG: 25 TABLET, FILM COATED ORAL at 01:03

## 2023-03-19 RX ADMIN — OXYCODONE HYDROCHLORIDE 10 MG: 5 SOLUTION ORAL at 05:03

## 2023-03-19 NOTE — PROGRESS NOTES
Karl Gaona - [facility]  General Surgery  Progress Note    Subjective:     History of Present Illness:  No notes on file    Post-Op Info:  Procedure(s) (LRB):  INJECTION, VOCAL CORD, LARYNGOSCOPIC (Left)   2 Days Post-Op     Interval History: NAEON, AF, HDS. Tolerated TF yesterday, up to 40cc. No nausea/vomiting. Denies diarrhea or cramping. Pain well controlled. Neck drain with minimal SS output. Chest tube was removed yesterday. No other complaints at the time.    Medications:  Continuous Infusions:  Scheduled Meds:   allopurinoL  100 mg Per J Tube Daily    bisacodyL  10 mg Rectal Daily    enoxaparin  40 mg Subcutaneous Daily    levalbuterol  0.63 mg Nebulization Q6H WAKE    metoprolol  12.5 mg Per J Tube QID    pantoprazole  40 mg Intravenous Daily    sennosides 8.8 mg/5 ml  5 mL Per J Tube QHS    tamsulosin  0.4 mg Oral Daily     PRN Meds:acetaminophen, glucagon (human recombinant), insulin aspart U-100, ondansetron, oxyCODONE, oxyCODONE, sodium chloride 0.9%     Review of patient's allergies indicates:  No Known Allergies  Objective:     Vital Signs (Most Recent):  Temp: 98.8 °F (37.1 °C) (03/19/23 0745)  Pulse: 62 (03/19/23 0745)  Resp: 17 (03/19/23 0745)  BP: (!) 153/68 (03/19/23 0745)  SpO2: (!) 94 % (03/19/23 0745) Vital Signs (24h Range):  Temp:  [97.1 °F (36.2 °C)-99.9 °F (37.7 °C)] 98.8 °F (37.1 °C)  Pulse:  [52-66] 62  Resp:  [16-20] 17  SpO2:  [93 %-98 %] 94 %  BP: (132-174)/(61-72) 153/68     Weight: 111.6 kg (246 lb)  Body mass index is 33.36 kg/m².    Intake/Output - Last 3 Shifts         03/17 0700 03/18 0659 03/18 0700 03/19 0659 03/19 0700 03/20 0659    NG/ 400     IV Piggyback 300      Total Intake(mL/kg) 410 (3.7) 400 (3.6)     Urine (mL/kg/hr) 1450 (0.5)      Drains 5 3     Stool 0      Chest Tube 80      Total Output 1535 3     Net -1125 +397            Stool Occurrence 0 x 1 x             Physical Exam  Vitals and nursing note reviewed.   Constitutional:       General: He is not in  acute distress.  Cardiovascular:      Rate and Rhythm: Normal rate.      Pulses: Normal pulses.      Comments: JOANN drain with serosanginous output  Pulmonary:      Effort: Pulmonary effort is normal. No respiratory distress.      Comments: CT to WS, no air leak  Abdominal:      General: There is no distension.      Palpations: Abdomen is soft.      Tenderness: There is no abdominal tenderness.      Comments: Incisions clean/dry with no erythema or drainage   Neurological:      Mental Status: He is alert.       Significant Labs:  I have reviewed all pertinent lab results within the past 24 hours.  CBC:   Recent Labs   Lab 03/19/23  0200   WBC 4.68   RBC 3.16*   HGB 9.9*   HCT 30.8*   *   MCV 98   MCH 31.3*   MCHC 32.1       BMP:   Recent Labs   Lab 03/19/23  0159   *      K 3.9      CO2 25   BUN 15   CREATININE 0.8   CALCIUM 8.8   MG 2.2       CMP:   Recent Labs   Lab 03/19/23  0159   *   CALCIUM 8.8   ALBUMIN 3.0*   PROT 6.4      K 3.9   CO2 25      BUN 15   CREATININE 0.8   ALKPHOS 152*   ALT 94*   AST 34   BILITOT 0.5         Significant Diagnostics:  I have reviewed all pertinent imaging results/findings within the past 24 hours.    Assessment/Plan:     * Esophageal adenocarcinoma  Lukasz Person is a 68 y.o. with PMHx of HTN, T2DM, KUMAR on CPAP, CAD, CKD3, A fib, HFrEF s/p INJECTION, VOCAL CORD, LARYNGOSCOPIC (Left) 2 Days Post-Op, doing well.     - NPO  - TF will advance to 40cc/hr [employer] 1.4 + FWF 200cc Q6  - am labs in process, will follow-up  - pain meds via J tube  - beta blocker via J tube  - liquid senna via J tube  - suppository  - PRN nausea meds  - Replace electrolytes PRN  - DVT prophylaxis   - OOBTC/Ambulate  - aggressive pulmonary toilet  - IS  - PT/OT    Dispo: SHABBIR Parks MD  General Surgery  [facility]

## 2023-03-19 NOTE — ASSESSMENT & PLAN NOTE
Lukasz Person is a 68 y.o. with PMHx of HTN, T2DM, KUMAR on CPAP, CAD, CKD3, A fib, HFrEF s/p INJECTION, VOCAL CORD, LARYNGOSCOPIC (Left) 2 Days Post-Op, doing well.     - NPO  - TF will advance to 40cc/hr [employer] 1.4 + FWF 200cc Q6  - am labs in process, will follow-up  - pain meds via J tube  - beta blocker via J tube  - liquid senna via J tube  - suppository  - PRN nausea meds  - Replace electrolytes PRN  - DVT prophylaxis   - OOBTC/Ambulate  - aggressive pulmonary toilet  - IS  - PT/OT    Dispo: SHABBIR

## 2023-03-19 NOTE — SUBJECTIVE & OBJECTIVE
Interval History: NAEON, AF, HDS. Tolerated TF yesterday, up to 40cc. No nausea/vomiting. Denies diarrhea or cramping. Pain well controlled. Neck drain with minimal SS output. Chest tube was removed yesterday. No other complaints at the time.    Medications:  Continuous Infusions:  Scheduled Meds:   allopurinoL  100 mg Per J Tube Daily    bisacodyL  10 mg Rectal Daily    enoxaparin  40 mg Subcutaneous Daily    levalbuterol  0.63 mg Nebulization Q6H WAKE    metoprolol  12.5 mg Per J Tube QID    pantoprazole  40 mg Intravenous Daily    sennosides 8.8 mg/5 ml  5 mL Per J Tube QHS    tamsulosin  0.4 mg Oral Daily     PRN Meds:acetaminophen, glucagon (human recombinant), insulin aspart U-100, ondansetron, oxyCODONE, oxyCODONE, sodium chloride 0.9%     Review of patient's allergies indicates:  No Known Allergies  Objective:     Vital Signs (Most Recent):  Temp: 98.8 °F (37.1 °C) (03/19/23 0745)  Pulse: 62 (03/19/23 0745)  Resp: 17 (03/19/23 0745)  BP: (!) 153/68 (03/19/23 0745)  SpO2: (!) 94 % (03/19/23 0745) Vital Signs (24h Range):  Temp:  [97.1 °F (36.2 °C)-99.9 °F (37.7 °C)] 98.8 °F (37.1 °C)  Pulse:  [52-66] 62  Resp:  [16-20] 17  SpO2:  [93 %-98 %] 94 %  BP: (132-174)/(61-72) 153/68     Weight: 111.6 kg (246 lb)  Body mass index is 33.36 kg/m².    Intake/Output - Last 3 Shifts         03/17 0700 03/18 0659 03/18 0700 03/19 0659 03/19 0700 03/20 0659    NG/ 400     IV Piggyback 300      Total Intake(mL/kg) 410 (3.7) 400 (3.6)     Urine (mL/kg/hr) 1450 (0.5)      Drains 5 3     Stool 0      Chest Tube 80      Total Output 1535 3     Net -1125 +397            Stool Occurrence 0 x 1 x             Physical Exam  Vitals and nursing note reviewed.   Constitutional:       General: He is not in acute distress.  Cardiovascular:      Rate and Rhythm: Normal rate.      Pulses: Normal pulses.      Comments: JOANN drain with serosanginous output  Pulmonary:      Effort: Pulmonary effort is normal. No respiratory distress.       Comments: CT to WS, no air leak  Abdominal:      General: There is no distension.      Palpations: Abdomen is soft.      Tenderness: There is no abdominal tenderness.      Comments: Incisions clean/dry with no erythema or drainage   Neurological:      Mental Status: He is alert.       Significant Labs:  I have reviewed all pertinent lab results within the past 24 hours.  CBC:   Recent Labs   Lab 03/19/23  0200   WBC 4.68   RBC 3.16*   HGB 9.9*   HCT 30.8*   *   MCV 98   MCH 31.3*   MCHC 32.1       BMP:   Recent Labs   Lab 03/19/23  0159   *      K 3.9      CO2 25   BUN 15   CREATININE 0.8   CALCIUM 8.8   MG 2.2       CMP:   Recent Labs   Lab 03/19/23  0159   *   CALCIUM 8.8   ALBUMIN 3.0*   PROT 6.4      K 3.9   CO2 25      BUN 15   CREATININE 0.8   ALKPHOS 152*   ALT 94*   AST 34   BILITOT 0.5         Significant Diagnostics:  I have reviewed all pertinent imaging results/findings within the past 24 hours.

## 2023-03-19 NOTE — PLAN OF CARE
No significant events during shift. Pt had c/o sharp pain around J-tube site, intervened with oxy 10 mg. Pt up in chair. Wheels locked. Family/ spouse to remain at bedside. Call light is within pt's reach.       Problem: Adult Inpatient Plan of Care  Goal: Plan of Care Review  Outcome: Ongoing, Progressing  Goal: Patient-Specific Goal (Individualized)  Outcome: Ongoing, Progressing  Goal: Absence of Hospital-Acquired Illness or Injury  Outcome: Ongoing, Progressing  Goal: Optimal Comfort and Wellbeing  Outcome: Ongoing, Progressing  Goal: Readiness for Transition of Care  Outcome: Ongoing, Progressing

## 2023-03-20 VITALS
HEIGHT: 72 IN | TEMPERATURE: 98 F | DIASTOLIC BLOOD PRESSURE: 74 MMHG | SYSTOLIC BLOOD PRESSURE: 169 MMHG | HEART RATE: 68 BPM | BODY MASS INDEX: 33.32 KG/M2 | OXYGEN SATURATION: 97 % | RESPIRATION RATE: 18 BRPM | WEIGHT: 246 LBS

## 2023-03-20 LAB
ALBUMIN SERPL BCP-MCNC: 3 G/DL (ref 3.5–5.2)
ALP SERPL-CCNC: 166 U/L (ref 55–135)
ALT SERPL W/O P-5'-P-CCNC: 71 U/L (ref 10–44)
ANION GAP SERPL CALC-SCNC: 7 MMOL/L (ref 8–16)
AST SERPL-CCNC: 31 U/L (ref 10–40)
BASOPHILS # BLD AUTO: 0.03 K/UL (ref 0–0.2)
BASOPHILS NFR BLD: 0.7 % (ref 0–1.9)
BILIRUB SERPL-MCNC: 0.5 MG/DL (ref 0.1–1)
BUN SERPL-MCNC: 14 MG/DL (ref 8–23)
CALCIUM SERPL-MCNC: 9.2 MG/DL (ref 8.7–10.5)
CHLORIDE SERPL-SCNC: 106 MMOL/L (ref 95–110)
CO2 SERPL-SCNC: 26 MMOL/L (ref 23–29)
CREAT SERPL-MCNC: 0.8 MG/DL (ref 0.5–1.4)
CRP SERPL-MCNC: 24.56 MG/L (ref 0–3.19)
DIFFERENTIAL METHOD: ABNORMAL
EOSINOPHIL # BLD AUTO: 0.2 K/UL (ref 0–0.5)
EOSINOPHIL NFR BLD: 5.4 % (ref 0–8)
ERYTHROCYTE [DISTWIDTH] IN BLOOD BY AUTOMATED COUNT: 16.6 % (ref 11.5–14.5)
EST. GFR  (NO RACE VARIABLE): >60 ML/MIN/1.73 M^2
GLUCOSE SERPL-MCNC: 152 MG/DL (ref 70–110)
HCT VFR BLD AUTO: 33.4 % (ref 40–54)
HGB BLD-MCNC: 10.5 G/DL (ref 14–18)
IMM GRANULOCYTES # BLD AUTO: 0.06 K/UL (ref 0–0.04)
IMM GRANULOCYTES NFR BLD AUTO: 1.5 % (ref 0–0.5)
LYMPHOCYTES # BLD AUTO: 0.3 K/UL (ref 1–4.8)
LYMPHOCYTES NFR BLD: 6.6 % (ref 18–48)
MAGNESIUM SERPL-MCNC: 2.1 MG/DL (ref 1.6–2.6)
MCH RBC QN AUTO: 30.4 PG (ref 27–31)
MCHC RBC AUTO-ENTMCNC: 31.4 G/DL (ref 32–36)
MCV RBC AUTO: 97 FL (ref 82–98)
MONOCYTES # BLD AUTO: 0.6 K/UL (ref 0.3–1)
MONOCYTES NFR BLD: 14.2 % (ref 4–15)
NEUTROPHILS # BLD AUTO: 2.9 K/UL (ref 1.8–7.7)
NEUTROPHILS NFR BLD: 71.6 % (ref 38–73)
NRBC BLD-RTO: 1 /100 WBC
PHOSPHATE SERPL-MCNC: 2.9 MG/DL (ref 2.7–4.5)
PLATELET # BLD AUTO: 140 K/UL (ref 150–450)
PMV BLD AUTO: 9.8 FL (ref 9.2–12.9)
POCT GLUCOSE: 150 MG/DL (ref 70–110)
POCT GLUCOSE: 152 MG/DL (ref 70–110)
POTASSIUM SERPL-SCNC: 4.1 MMOL/L (ref 3.5–5.1)
PROT SERPL-MCNC: 6.6 G/DL (ref 6–8.4)
RBC # BLD AUTO: 3.45 M/UL (ref 4.6–6.2)
SODIUM SERPL-SCNC: 139 MMOL/L (ref 136–145)
WBC # BLD AUTO: 4.09 K/UL (ref 3.9–12.7)

## 2023-03-20 PROCEDURE — 36415 COLL VENOUS BLD VENIPUNCTURE: CPT | Performed by: STUDENT IN AN ORGANIZED HEALTH CARE EDUCATION/TRAINING PROGRAM

## 2023-03-20 PROCEDURE — 97116 GAIT TRAINING THERAPY: CPT | Mod: CQ

## 2023-03-20 PROCEDURE — 25000003 PHARM REV CODE 250: Performed by: STUDENT IN AN ORGANIZED HEALTH CARE EDUCATION/TRAINING PROGRAM

## 2023-03-20 PROCEDURE — 83735 ASSAY OF MAGNESIUM: CPT | Performed by: STUDENT IN AN ORGANIZED HEALTH CARE EDUCATION/TRAINING PROGRAM

## 2023-03-20 PROCEDURE — 99900035 HC TECH TIME PER 15 MIN (STAT)

## 2023-03-20 PROCEDURE — 84100 ASSAY OF PHOSPHORUS: CPT | Performed by: STUDENT IN AN ORGANIZED HEALTH CARE EDUCATION/TRAINING PROGRAM

## 2023-03-20 PROCEDURE — 97530 THERAPEUTIC ACTIVITIES: CPT

## 2023-03-20 PROCEDURE — 94799 UNLISTED PULMONARY SVC/PX: CPT

## 2023-03-20 PROCEDURE — 94664 DEMO&/EVAL PT USE INHALER: CPT

## 2023-03-20 PROCEDURE — 85025 COMPLETE CBC W/AUTO DIFF WBC: CPT | Performed by: STUDENT IN AN ORGANIZED HEALTH CARE EDUCATION/TRAINING PROGRAM

## 2023-03-20 PROCEDURE — 80053 COMPREHEN METABOLIC PANEL: CPT | Performed by: STUDENT IN AN ORGANIZED HEALTH CARE EDUCATION/TRAINING PROGRAM

## 2023-03-20 PROCEDURE — 25000242 PHARM REV CODE 250 ALT 637 W/ HCPCS

## 2023-03-20 PROCEDURE — 94640 AIRWAY INHALATION TREATMENT: CPT

## 2023-03-20 PROCEDURE — 86141 C-REACTIVE PROTEIN HS: CPT | Performed by: STUDENT IN AN ORGANIZED HEALTH CARE EDUCATION/TRAINING PROGRAM

## 2023-03-20 PROCEDURE — C9113 INJ PANTOPRAZOLE SODIUM, VIA: HCPCS | Performed by: STUDENT IN AN ORGANIZED HEALTH CARE EDUCATION/TRAINING PROGRAM

## 2023-03-20 PROCEDURE — 94761 N-INVAS EAR/PLS OXIMETRY MLT: CPT

## 2023-03-20 PROCEDURE — 63600175 PHARM REV CODE 636 W HCPCS: Performed by: STUDENT IN AN ORGANIZED HEALTH CARE EDUCATION/TRAINING PROGRAM

## 2023-03-20 PROCEDURE — 97535 SELF CARE MNGMENT TRAINING: CPT

## 2023-03-20 PROCEDURE — 25000242 PHARM REV CODE 250 ALT 637 W/ HCPCS: Performed by: STUDENT IN AN ORGANIZED HEALTH CARE EDUCATION/TRAINING PROGRAM

## 2023-03-20 RX ORDER — HYDROCODONE BITARTRATE AND ACETAMINOPHEN 7.5; 325 MG/15ML; MG/15ML
15 SOLUTION ORAL EVERY 6 HOURS PRN
Qty: 118 ML | Refills: 0 | Status: SHIPPED | OUTPATIENT
Start: 2023-03-20 | End: 2023-03-27 | Stop reason: SDUPTHER

## 2023-03-20 RX ADMIN — PANTOPRAZOLE SODIUM 40 MG: 40 INJECTION, POWDER, FOR SOLUTION INTRAVENOUS at 09:03

## 2023-03-20 RX ADMIN — TAMSULOSIN HYDROCHLORIDE 0.4 MG: 0.4 CAPSULE ORAL at 09:03

## 2023-03-20 RX ADMIN — OXYCODONE HYDROCHLORIDE 5 MG: 5 SOLUTION ORAL at 09:03

## 2023-03-20 RX ADMIN — LEVALBUTEROL HYDROCHLORIDE 0.63 MG: 0.63 SOLUTION RESPIRATORY (INHALATION) at 08:03

## 2023-03-20 RX ADMIN — METOPROLOL TARTRATE 12.5 MG: 25 TABLET, FILM COATED ORAL at 09:03

## 2023-03-20 RX ADMIN — OXYCODONE HYDROCHLORIDE 5 MG: 5 SOLUTION ORAL at 05:03

## 2023-03-20 NOTE — PLAN OF CARE
SSC met with patient/family at bedside. Patient experience rounding completed and reviewed the following.     Do you know your discharge plan? Yes or No,    If yes, what is the plan? (Home, Home Health, Rehab, SNF, LTAC, or Other)     Home    If you are discharging home, do you have help at home? Yes or No      Yes    Do you think you will need help at home at discharge? Yes or No     Yes       Have you discussed your needs and preferences with your SW/CM? Yes or No     Yes    Assigned SW/CM notified of any patient/family needs or concerns.     Vera [facility]  Case Management   [phone]

## 2023-03-20 NOTE — SUBJECTIVE & OBJECTIVE
Interval History: No acute events overnight. Tolerating tube feeds without issue. Walking. Pain controlled. No nausea or vomiting.     Medications:  Continuous Infusions:  Scheduled Meds:   bisacodyL  10 mg Rectal Daily    enoxaparin  40 mg Subcutaneous Daily    levalbuterol  0.63 mg Nebulization Q6H WAKE    metoprolol  12.5 mg Per J Tube QID    pantoprazole  40 mg Intravenous Daily    sennosides 8.8 mg/5 ml  5 mL Per J Tube QHS    tamsulosin  0.4 mg Oral Daily     PRN Meds:glucagon (human recombinant), insulin aspart U-100, ondansetron, oxyCODONE, oxyCODONE, sodium chloride 0.9%     Review of patient's allergies indicates:  No Known Allergies  Objective:     Vital Signs (Most Recent):  Temp: 98.3 °F (36.8 °C) (03/20/23 0458)  Pulse: 62 (03/20/23 0458)  Resp: 18 (03/20/23 0527)  BP: (!) 159/69 (03/20/23 0458)  SpO2: (!) 93 % (03/20/23 0458)   Vital Signs (24h Range):  Temp:  [96.5 °F (35.8 °C)-98.9 °F (37.2 °C)] 98.3 °F (36.8 °C)  Pulse:  [58-65] 62  Resp:  [17-20] 18  SpO2:  [92 %-98 %] 93 %  BP: (147-185)/(66-80) 159/69     Weight: 111.6 kg (246 lb)  Body mass index is 33.36 kg/m².    Intake/Output - Last 3 Shifts         03/18 0700  03/19 0659 03/19 0700  03/20 0659 03/20 0700  03/21 0659    NG/ 1080     IV Piggyback       Total Intake(mL/kg) 400 (3.6) 1080 (9.7)     Urine (mL/kg/hr)       Drains 3      Stool       Chest Tube       Total Output 3      Net +397 +1080            Urine Occurrence  2 x     Stool Occurrence 1 x 1 x             Physical Exam  Vitals and nursing note reviewed.   Cardiovascular:      Rate and Rhythm: Normal rate.   Pulmonary:      Effort: Pulmonary effort is normal.   Abdominal:      General: There is no distension.      Tenderness: There is abdominal tenderness. There is no guarding or rebound.      Comments: Abdominal and chest incisions clean, dry, and intact covered with dermabond. Abdomen is appropriately tender but mostly soft and without peritoneal signs.        Significant  Labs:  I have reviewed all pertinent lab results within the past 24 hours.  CBC:   Recent Labs   Lab 03/20/23 0522   WBC 4.09   RBC 3.45*   HGB 10.5*   HCT 33.4*   *   MCV 97   MCH 30.4   MCHC 31.4*     BMP:   Recent Labs   Lab 03/20/23 0522   *      K 4.1      CO2 26   BUN 14   CREATININE 0.8   CALCIUM 9.2   MG 2.1       Significant Diagnostics:  none

## 2023-03-20 NOTE — PLAN OF CARE
[facility]  Discharge Final Note    Primary Care Provider: Kirk Wilson MD    Expected Discharge Date: 3/20/2023    Final Discharge Note (most recent)       Final Note - 03/20/23 1059          Final Note    Assessment Type Final Discharge Note     Anticipated Discharge Disposition Home-Health Care [facility]     Hospital Resources/Appts/Education Provided Provided patient/caregiver with written discharge plan information        Post-Acute Status    Post-Acute Authorization Home Health     Home Health Status Set-up Complete/Auth obtained     Coverage Medicare     IV Infusion Status Set-up Complete/Auth obtained     Patient choice form signed by patient/caregiver List with quality metrics by geographic area provided     Discharge Delays None known at this time                     Important Message from Medicare             Contact Info       Santana Brown Jr, MD   Specialty: Surgical Oncology    [address]  [address]  [place] [address]   Phone: [phone]       Next Steps: Follow up in 1 week(s)          Pt to d.c home with family. Tube feeds via [facility]. [facility] to being services on 3/21/23.    Vera Myles LCSW  Case Management/[facility]  [phone]

## 2023-03-20 NOTE — DISCHARGE SUMMARY
[facility]  General Surgery  Discharge Summary      Patient Name: Lukasz Person  MRN: [id number]  Admission Date: 3/14/2023  Hospital Length of Stay: 6 days  Discharge Date and Time:  03/20/2023 9:33 AM  Attending Physician: Santana Brown Jr., MD   Discharging Provider: Thalia Ramos NP  Primary Care Provider: Kirk Wilson MD    HPI:   Lukasz Person is a 68 y.o. with PMHx of HTN, T2DM, KUMAR on CPAP, CAD, CKD3, A fib, HFrEF for esophagectomy on 3/14/2023.    Procedure(s) (LRB):  INJECTION, VOCAL CORD, LARYNGOSCOPIC (Left)      Indwelling Lines/Drains at time of discharge:   Lines/Drains/Airways       Central Venous Catheter Line  Duration                  PowerPort A Cath Single Lumen 12/22/22 0912 right internal jugular 87 days              Drain  Duration                  Closed/Suction Drain 03/14/23 1607 Left Neck Bulb 15 Fr. 5 days         Gastrostomy/Enterostomy 03/14/23 1658 Jejunostomy tube LUQ 5 days                  Hospital Course:  Lukasz Person is a 68 y.o. with PMHx of HTN, T2DM, KUMAR on CPAP, CAD, CKD3, A fib, HFrEF s/p esophagectomy on 3/14/2023.  Mr. Person remained on the esophagectomy pathway except for voice hoarseness and productive cough.  ENT consulted and patient with left true vocal fold paralysis.  To OR for direct laryngoscopy with injection of 0.5 cc of Restylane into left true vocal fold on 3/17/23 by Dr. Altman.  Patient to f/u with Dr. Valdivia in 2-3 months. The patient is tolerating tube feeds via j-tube from 2pm-8am.   Pateint to increase TF by 10cc each day at 2pm to a goal of 80cc/hour .  He is voiding and ambulating without difficulty.  Patient with no complaints of nausea, vomiting, chest pain or shortness of breath.  His vital signs stable. He is afebrile. He is positive for flatus and positive for bowel sounds.  Patient is NPO except for necessary medications with a sip of water in the am (flomax, allopurinol, lisinopril/hctz, Troprol XL, Prilosec, Xarelto, Crestor).        Physical Exam  Vitals and nursing note reviewed.   Cardiovascular:      Rate and Rhythm: Normal rate.   Pulmonary:      Effort: Pulmonary effort is normal.   Abdominal:      General: There is no distension.      Tenderness: There is abdominal tenderness. There is no guarding or rebound.      Comments: Abdominal and chest incisions clean, dry, and intact covered with dermabond. Abdomen is appropriately tender but mostly soft and without peritoneal signs.   Neck: left neck incision c/d/I; dressing c/d/I to old bulb drain site.     Goals of Care Treatment Preferences:  Code Status: Full Code      Consults:   Consults (From admission, onward)          Status Ordering Provider     Inpatient consult to ENT  Once        Provider:  (Not yet assigned)    Completed DAQUAN CHAUHAN            Significant Diagnostic Studies: Labs:   CMP   Recent Labs   Lab 03/19/23  0159 03/20/23  0522    139   K 3.9 4.1    106   CO2 25 26   * 152*   BUN 15 14   CREATININE 0.8 0.8   CALCIUM 8.8 9.2   PROT 6.4 6.6   ALBUMIN 3.0* 3.0*   BILITOT 0.5 0.5   ALKPHOS 152* 166*   AST 34 31   ALT 94* 71*   ANIONGAP 10 7*    and CBC   Recent Labs   Lab 03/19/23  0200 03/20/23  0522   WBC 4.68 4.09   HGB 9.9* 10.5*   HCT 30.8* 33.4*   * 140*       Pending Diagnostic Studies:       Procedure Component Value Units Date/Time    Specimen to Pathology, Surgery General Surgery (Surgical Oncology) [[id number]] Collected: 03/14/23 1720    Order Status: Sent Lab Status: In process Updated: 03/15/23 1329    Specimen: Tissue           Final Active Diagnoses:    Diagnosis Date Noted POA    PRINCIPAL PROBLEM:  Esophageal adenocarcinoma [C15.9] 12/08/2022 Yes     Chronic    Left true vocal fold immobility and bowing [J38.3] 03/16/2023 No    CKD stage 3 due to type 2 diabetes mellitus [E11.22, N18.30] 09/13/2022 Yes     Chronic    HFrEF (heart failure with reduced ejection fraction) [I50.20] 03/24/2022 Yes     Chronic    Paroxysmal atrial  fibrillation [I48.0] 03/08/2022 Yes     Chronic    Coronary artery disease involving native coronary artery of native heart [I25.10] 09/30/2020 Yes     Chronic    Hypertension associated with diabetes [E11.59, I15.2] 09/14/2020 Yes     Chronic    KUMAR on CPAP [G47.33, Z99.89] 08/22/2020 Not Applicable     Chronic    Type 2 diabetes mellitus with kidney complication, without long-term current use of insulin [E11.29] 08/22/2020 Yes     Chronic      Problems Resolved During this Admission:      Discharged Condition: good    Disposition: Home-Health Care [facility]    Follow Up:   Follow-up Information       Santana Brown Jr, MD Follow up in 1 week(s).    Specialty: Surgical Oncology  Contact information:  [facility]  5th Floor  [facility] [address]  [phone]                           Patient Instructions:      FL Esophagram Complete   Standing Status: Future Standing Exp. Date: 03/17/24     Order Specific Question Answer Comments   May the Radiologist modify the order per protocol to meet the clinical needs of the patient? Yes    Is the patient allergic to iodine contrast? No    Release to patient Immediate      Diet NPO   Order Comments: NPO except for medications with a sip of water.  Take these with a sip of water as prescribed (Flomax, allopurinol,lisinopril/hctz,Toprol XL, prilosec, Xaralto,Crestor)     Lifting restrictions   Order Comments: No lifting > 10 pounds.   May shower.     Notify your health care provider if you experience any of the following:  temperature >100.4     Notify your health care provider if you experience any of the following:  persistent nausea and vomiting or diarrhea     Notify your health care provider if you experience any of the following:  severe uncontrolled pain     Notify your health care provider if you experience any of the following:  redness, tenderness, or signs of infection (pain, swelling, redness, odor or green/yellow discharge around incision site)     Notify your  health care provider if you experience any of the following:  difficulty breathing or increased cough     Notify your health care provider if you experience any of the following:  severe persistent headache     Notify your health care provider if you experience any of the following:  worsening rash     Notify your health care provider if you experience any of the following:  persistent dizziness, light-headedness, or visual disturbances     Notify your health care provider if you experience any of the following:  increased confusion or weakness     Change dressing (specify)   Order Comments: Dressing change: left neck daily and as needed with 4x4 gauze and paper tape.     Tube Feedings/Formulas   Order Comments: On from 2pm-8am.  Increase by 10cc each day at 2pm to a goal of 80cc/hour.     Order Specific Question Answer Comments   Select Adult Formula: Other Agent Panda 1.4   Route: Jejunostomy    Formula Rate (mL/hr): 50      Activity as tolerated     Medications:  Reconciled Home Medications:      Medication List        START taking these medications      hydrocodone-apap 7.5-325 MG/15 ML oral solution  Commonly known as: HYCET  15 mLs by Per J Tube route every 6 (six) hours as needed for Pain.            CHANGE how you take these medications      rosuvastatin 20 MG tablet  Commonly known as: CRESTOR  TAKE 1 TABLET(20 MG) BY MOUTH EVERY DAY  What changed: when to take this     tamsulosin 0.4 mg Cap  Commonly known as: FLOMAX  Take 1 capsule (0.4 mg total) by mouth once daily.  What changed: when to take this            CONTINUE taking these medications      ACCU-CHEK ANTONELLA PLUS TEST STRP Strp  Generic drug: blood sugar diagnostic  TEST EVERY MORNING     ACCU-CHEK SOFTCLIX LANCETS Misc  Generic drug: lancets  USE EVERY DAY     allopurinoL 100 MG tablet  Commonly known as: ZYLOPRIM  TAKE 1 TABLET BY MOUTH EVERY DAY     blood-glucose meter kit  Checks blood sugars 1x/daily.     citric acid-potassium citrate 1,100334  mg/5 mL solution  Commonly known as: POLYCITRA  Take 5 mLs (10 mEq total) by mouth 3 (three) times daily with meals.     glimepiride 2 MG tablet  Commonly known as: AMARYL  TAKE 2 TABLETS BY MOUTH EVERY MORNING     lisinopriL-hydrochlorothiazide 20-25 mg Tab  Commonly known as: PRINZIDE,ZESTORETIC  TAKE 1 TABLET BY MOUTH EVERY DAY     * magic mouthwash diphen/antac/lidoc  Swish and swallow 10 ML's every 6 hours as needed (pain swallowing)     metFORMIN 500 mg/5 mL Soln  Take 1,000 mg by mouth 2 (two) times a day.     metoprolol succinate 25 MG 24 hr tablet  Commonly known as: TOPROL-XL  Take 1 tablet (25 mg total) by mouth once daily.     nitroGLYCERIN 0.4 MG SL tablet  Commonly known as: NITROSTAT  Place 1 tablet (0.4 mg total) under the tongue every 5 (five) minutes as needed for Chest pain. If you need a third tablet, call 911     omeprazole 40 MG capsule  Commonly known as: PRILOSEC  Take 1 capsule (40 mg total) by mouth once daily.     rivaroxaban 20 mg Tab  Commonly known as: XARELTO  Take 1 tablet (20 mg total) by mouth daily with dinner or evening meal.     testosterone 20.25 mg/1.25 gram (1.62 %) Glpm  Commonly known as: AndroGeL  Apply 4 pumps to shoulders daily           * This list has 1 medication(s) that are the same as other medications prescribed for you. Read the directions carefully, and ask your doctor or other care provider to review them with you.                ASK your doctor about these medications      * diphenhydrAMINE-aluminum-magnesium hydroxide-simethicone-LIDOcaine HCl 2%  Swish and swallow 10 mLs every 6 (six) hours as needed (pain swallowing).           * This list has 1 medication(s) that are the same as other medications prescribed for you. Read the directions carefully, and ask your doctor or other care provider to review them with you.                Time spent on the discharge of patient: 20 minutes    Thalia Ramos NP  General Surgery  [facility]

## 2023-03-20 NOTE — PLAN OF CARE
Problem: Adult Inpatient Plan of Care  Goal: Absence of Hospital-Acquired Illness or Injury  Outcome: Ongoing, Progressing  Goal: Optimal Comfort and Wellbeing  Outcome: Ongoing, Progressing  Goal: Readiness for Transition of Care  Outcome: Ongoing, Progressing     Problem: Diabetes Comorbidity  Goal: Blood Glucose Level Within Targeted Range  Outcome: Ongoing, Progressing     Problem: Infection  Goal: Absence of Infection Signs and Symptoms  Outcome: Ongoing, Progressing     Problem: Fall Injury Risk  Goal: Absence of Fall and Fall-Related Injury  Outcome: Ongoing, Progressing     Problem: Skin Injury Risk Increased  Goal: Skin Health and Integrity  Outcome: Ongoing, Progressing

## 2023-03-20 NOTE — ASSESSMENT & PLAN NOTE
Lukasz Person is a 68 y.o. with PMHx of HTN, T2DM, KUMAR on CPAP, CAD, CKD3, A fib, HFrEF s/p INJECTION, VOCAL CORD, LARYNGOSCOPIC (Left) 3 Days Post-Op, doing well.     - NPO  - TF will advance to 50cc/hr [place] 1.4 + FWF 200cc Q6  - pain meds via J tube  - beta blocker via J tube  - liquid senna via J tube  - PO PPI  - suppository  - PRN nausea meds  - Replace electrolytes PRN  - DVT prophylaxis   - OOBTC/Ambulate  - aggressive pulmonary toilet  - IS  - PT/OT    Dispo: plan for d/c today

## 2023-03-20 NOTE — PT/OT/SLP PROGRESS
Occupational Therapy   Treatment    Name: Lukasz Person  MRN: [id number]  Admitting Diagnosis:  Esophageal adenocarcinoma  3 Days Post-Op    Recommendations:     Discharge Recommendations: home  Discharge Equipment Recommendations:  none  Barriers to discharge:  None    Assessment:     Lukasz Person is a 68 y.o. male with a medical diagnosis of Esophageal adenocarcinoma. Pt walked 500' with (S). Performance deficits affecting function are impaired endurance, impaired self care skills, impaired functional mobility, gait instability, decreased coordination.     Rehab Prognosis:  Good; patient would benefit from acute skilled OT services to address these deficits and reach maximum level of function.       Plan:     Patient to be seen 3 x/week to address the above listed problems via self-care/home management, therapeutic activities, therapeutic exercises  Plan of Care Expires: 03/29/23  Plan of Care Reviewed with: patient, spouse    Subjective     Pain/Comfort:  Pain Rating 1: 0/10    Objective:     Communicated with: rn prior to session.  Patient found supine with JOANN drain, PEG Tube upon OT entry to room.    General Precautions: Standard, fall    Orthopedic Precautions:N/A  Braces: N/A  Respiratory Status: Room air     Occupational Performance:     Bed Mobility:    Patient completed Scooting/Bridging with supervision  Patient completed Supine to Sit with supervision     Functional Mobility/Transfers:  Patient completed Sit <> Stand Transfer with supervision  with  no assistive device   Patient completed Bed <> Chair Transfer using Step Transfer technique with supervision with no assistive device  Functional Mobility: Pt walked 500' with (S) / no AD.    Activities of Daily Living:  Grooming: supervision standing at the sink.    [facility] 6 Click ADL: 22    Treatment & Education:  Discussed OT POC and progress.    Patient left up in chair with all lines intact and call button in reach    GOALS:   Multidisciplinary Problems        Occupational Therapy Goals          Problem: Occupational Therapy    Goal Priority Disciplines Outcome Interventions   Occupational Therapy Goal     OT, PT/OT Ongoing, Progressing    Description: Goals to be met by: 3/29/23     Patient will increase functional independence with ADLs by performing:    Feeding with Supervision.  UE Dressing with Supervision.  LE Dressing with Supervision.  Grooming while standing with Supervision.  Toileting from toilet with Supervision for hygiene and clothing management.                          Time Tracking:     OT Date of Treatment: 03/20/23  OT Start Time: 0816  OT Stop Time: 0839  OT Total Time (min): 23 min    Billable Minutes:Self Care/Home Management 10  Therapeutic Activity 13    OT/TICO: OT          3/20/2023

## 2023-03-20 NOTE — PROGRESS NOTES
[facility]  General Surgery  Progress Note    Subjective:       Post-Op Info:  Procedure(s) (LRB):  INJECTION, VOCAL CORD, LARYNGOSCOPIC (Left)   3 Days Post-Op     Interval History: No acute events overnight. Tolerating tube feeds without issue. Walking. Pain controlled. No nausea or vomiting.     Medications:  Continuous Infusions:  Scheduled Meds:   bisacodyL  10 mg Rectal Daily    enoxaparin  40 mg Subcutaneous Daily    levalbuterol  0.63 mg Nebulization Q6H WAKE    metoprolol  12.5 mg Per J Tube QID    pantoprazole  40 mg Intravenous Daily    sennosides 8.8 mg/5 ml  5 mL Per J Tube QHS    tamsulosin  0.4 mg Oral Daily     PRN Meds:glucagon (human recombinant), insulin aspart U-100, ondansetron, oxyCODONE, oxyCODONE, sodium chloride 0.9%     Review of patient's allergies indicates:  No Known Allergies  Objective:     Vital Signs (Most Recent):  Temp: 98.3 °F (36.8 °C) (03/20/23 0458)  Pulse: 62 (03/20/23 0458)  Resp: 18 (03/20/23 0527)  BP: (!) 159/69 (03/20/23 0458)  SpO2: (!) 93 % (03/20/23 0458)   Vital Signs (24h Range):  Temp:  [96.5 °F (35.8 °C)-98.9 °F (37.2 °C)] 98.3 °F (36.8 °C)  Pulse:  [58-65] 62  Resp:  [17-20] 18  SpO2:  [92 %-98 %] 93 %  BP: (147-185)/(66-80) 159/69     Weight: 111.6 kg (246 lb)  Body mass index is 33.36 kg/m².    Intake/Output - Last 3 Shifts         03/18 0700  03/19 0659 03/19 0700  03/20 0659 03/20 0700  03/21 0659    NG/ 1080     IV Piggyback       Total Intake(mL/kg) 400 (3.6) 1080 (9.7)     Urine (mL/kg/hr)       Drains 3      Stool       Chest Tube       Total Output 3      Net +397 +1080            Urine Occurrence  2 x     Stool Occurrence 1 x 1 x             Physical Exam  Vitals and nursing note reviewed.   Cardiovascular:      Rate and Rhythm: Normal rate.   Pulmonary:      Effort: Pulmonary effort is normal.   Abdominal:      General: There is no distension.      Tenderness: There is abdominal tenderness. There is no guarding or rebound.       Comments: Abdominal and chest incisions clean, dry, and intact covered with dermabond. Abdomen is appropriately tender but mostly soft and without peritoneal signs.        Significant Labs:  I have reviewed all pertinent lab results within the past 24 hours.  CBC:   Recent Labs   Lab 03/20/23  0522   WBC 4.09   RBC 3.45*   HGB 10.5*   HCT 33.4*   *   MCV 97   MCH 30.4   MCHC 31.4*     BMP:   Recent Labs   Lab 03/20/23  0522   *      K 4.1      CO2 26   BUN 14   CREATININE 0.8   CALCIUM 9.2   MG 2.1       Significant Diagnostics:  none    Assessment/Plan:     * Esophageal adenocarcinoma  Lukasz Person is a 68 y.o. with PMHx of HTN, T2DM, KUMAR on CPAP, CAD, CKD3, A fib, HFrEF s/p INJECTION, VOCAL CORD, LARYNGOSCOPIC (Left) 3 Days Post-Op, doing well.     - NPO  - pull neck drain  - TF will advance to 50cc/hr [place] [employer] 1.4 + FWF 200cc Q6  - pain meds via J tube  - beta blocker via J tube  - liquid senna via J tube  - PO PPI  - suppository  - PRN nausea meds  - Replace electrolytes PRN  - DVT prophylaxis   - OOBTC/Ambulate  - aggressive pulmonary toilet  - IS  - PT/OT    Dispo: plan for d/c today            Kirk Mccullough MD  General Surgery  [facility]

## 2023-03-20 NOTE — PROGRESS NOTES
[facility] Home (Pump) Tube Feeding Education/Discharge Planning Note:     Bedside home Anthony Pump education completed with patient and spouse on 3/20/2023. Home tube feeding regimen ([facility] Standard 1.4 @ (goal) 80ml/hr x 18 hours + water flush of 55ml/hr for every hour on feeds per HH orders) reviewed and provided. Teach back by patient and spouse demonstrated. Also provided review/education on formula safety, giving medications through feeding tube (importance of liquid medication ONLY through J-tube emphasized), and feeding tube site care. Additional education materials also provided. Time allotted for questions; questions addressed appropriately. Patient and family agreeable with the enteral discharge plan. Provided patient with [facility] support number [phone]. Patients home tube feeding supplies and formula have been delivered to the bedside. Patient is ready for discharge home from [facility] perspective. Patient's discharge planning team and bedside nurse updated with the information above.      Will continue to follow while inpatient.     Please do not hesitate reach out for any additional needs in the interim.     Stefan Najera MS, RDN, LDN  Clinical Liaison & Dietitian  [facility]   Desk: [phone]  Other Phone: [phone]  [email or web address]

## 2023-03-20 NOTE — PHYSICIAN QUERY
PT Name: Lukasz Person  MR #: [id number]    DOCUMENTATION CLARIFICATION     CDS: Dora WATKINS RN  Contact information: [email or web address]      This form is a permanent document in the medical record.     Query Date: March 20, 2023    Dear Provider,  By submitting this query, we are merely seeking further clarification of documentation. Please utilize your independent clinical judgment when addressing the question(s) below.    The medical record contains the following:  Supporting Clinical Findings Location in Medical Record   History of Present Illness: Mr. Person is a 67 year-old male with T3N0M0 adenocarcinoma of the distal esophagus s/p robotic esophagectomy with a left transcervical approach on 3/14. He was extubated postoperatively without any complications. He has persistent dysphonia and weak cough    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  67 year-old male with T3N0M0 adenocarcinoma of the distal esophagus s/p robotic esophagectomy and a left transcervical approach on 3/14. ENT consulted for persistent dysphonia. Flexible laryngoscopy shows a left true vocal fold hypomobility. There is also a glottic gap with maximal phonation.    PREOPERATIVE DIAGNOSES:   Left true vocal fold paralysis status post esophagectomy  POSTOPERATIVE DIAGNOSES:   Same  PROCEDURES PERFORMED:   Direct laryngoscopy with injection of 0.5 cc of Restylane into left true vocal fold Consults ENT 3/16/2023          Consults ENT 3/16/2023            Op Note 3/18/2023       Please clarify if ___Left true vocal fold paralysis___ (as it relates to __esophagectomy___) is:      [  ] Inherent/Integral to the procedure   [X] Complication of the procedure   [  ] Present, but not a complication of the procedure   [  ] Incidental finding, not clinically significant   [  ] Intended/required to complete procedure   [  ] Other (please specify): __________________   [  ] Clinically Undetermined       Please document in your progress notes daily for  the duration of treatment until resolved and include in your discharge summary.

## 2023-03-20 NOTE — PT/OT/SLP PROGRESS
Physical Therapy Treatment    Patient Name:  Lukasz Person   MRN:  [id number]    Recommendations:     Discharge Recommendations: home  Discharge Equipment Recommendations: none  Barriers to discharge: None    Assessment:     Lukasz Person is a 68 y.o. male admitted with a medical diagnosis of Esophageal adenocarcinoma.  He presents with the following impairments/functional limitations: impaired endurance, impaired functional mobility, gait instability, impaired balance, decreased safety awareness. Pt required SBA no AD for all mobility. Resume PT POC as indicated.     Rehab Prognosis: Good; patient would benefit from acute skilled PT services to address these deficits and reach maximum level of function.    Recent Surgery: Procedure(s) (LRB):  INJECTION, VOCAL CORD, LARYNGOSCOPIC (Left) 3 Days Post-Op    Plan:     During this hospitalization, patient to be seen 3 x/week to address the identified rehab impairments via gait training, therapeutic activities, therapeutic exercises and progress toward the following goals:    Plan of Care Expires:  04/13/23    Subjective     Chief Complaint: none   Patient/Family Comments/goals: none   Pain/Comfort:  Pain Rating 1: 6/10  Location - Orientation 1: lateral  Location 1:  (pt did not specify what side)  Pain Addressed 1: Pre-medicate for activity  Pain Rating Post-Intervention 1: 6/10      Objective:     Communicated with nursing prior to session.  Patient found up in chair with  (all lines intact) upon PT entry to room.     General Precautions: Standard, fall  Orthopedic Precautions: N/A  Braces: N/A  Respiratory Status: Room air     Functional Mobility:  Transfers:  Sit to Stand:  stand by assistance with no AD  Gait: 200ft no AD with SBA  Stairs:  Pt ascended/descended 10 stair(s) with No Assistive Device with right handrail with Stand-by Assistance.       AM-PAC 6 CLICK MOBILITY  Turning over in bed (including adjusting bedclothes, sheets and blankets)?: 3  Sitting down on  and standing up from a chair with arms (e.g., wheelchair, bedside commode, etc.): 3  Moving from lying on back to sitting on the side of the bed?: 3  Moving to and from a bed to a chair (including a wheelchair)?: 3  Need to walk in hospital room?: 3  Climbing 3-5 steps with a railing?: 3  Basic Mobility Total Score: 18       Treatment & Education:  -All questions/concerns answered within PTA scope of practice.     Patient left up in chair with all lines intact, call button in reach, nursing notified, and spouse present..    GOALS:   Multidisciplinary Problems       Physical Therapy Goals          Problem: Physical Therapy    Goal Priority Disciplines Outcome Goal Variances Interventions   Physical Therapy Goal     PT, PT/OT Ongoing, Progressing     Description: Goals to be met by: 3/25/23     Patient will increase functional independence with mobility by performin. Supine to sit with Stand-by Assistance  2. Sit to stand transfer with Supervision  3. Gait  x 250 feet with Supervision   4. Ascend/descend 5 stair with right Handrails Stand-by Assistance                           Time Tracking:     PT Received On: 23  PT Start Time: 847     PT Stop Time: 902  PT Total Time (min): 15 min     Billable Minutes: Gait Training 15    Treatment Type: Treatment  PT/PTA: PTA     Number of PTA visits since last PT visit: 2023

## 2023-03-21 ENCOUNTER — TELEPHONE (OUTPATIENT)
Dept: SURGERY | Facility: CLINIC | Age: 68
End: 2023-03-21
Payer: MEDICARE

## 2023-03-21 ENCOUNTER — PATIENT OUTREACH (OUTPATIENT)
Dept: ADMINISTRATIVE | Facility: CLINIC | Age: 68
End: 2023-03-21
Payer: MEDICARE

## 2023-03-21 PROCEDURE — G0180 PR HOME HEALTH MD CERTIFICATION: ICD-10-PCS | Mod: ,,, | Performed by: STUDENT IN AN ORGANIZED HEALTH CARE EDUCATION/TRAINING PROGRAM

## 2023-03-21 PROCEDURE — G0180 MD CERTIFICATION HHA PATIENT: HCPCS | Mod: ,,, | Performed by: STUDENT IN AN ORGANIZED HEALTH CARE EDUCATION/TRAINING PROGRAM

## 2023-03-21 NOTE — PROGRESS NOTES
C3 nurse attempted to contact Lukasz Person  for a TCC post hospital discharge follow up call. The patient is unable to conduct the call @ this time. The patient requested a callback.    The patient does not have a scheduled HOSFU appointment within 5-7 days post hospital discharge date 03/20/23. Message sent to Physician staff to assist with HOSFU appointment scheduling.      HH present with pt.   Ambulatory

## 2023-03-21 NOTE — TELEPHONE ENCOUNTER
----- Message from Lin Smith sent at 3/21/2023 10:58 AM CDT -----  Regarding: Patient advice  Contact: Ramone [phone]  Home health nurse requesting a call back regarding patient medication sugar levels 149 would like to discuss

## 2023-03-21 NOTE — TELEPHONE ENCOUNTER
Received incoming call. Spoke to Ramone with HH. Requesting clarification if pt needs to take metformin suspension. If so, pt was not provided medication bedside and is in need of medication. Also inquiring if pt needs to take glimepiride PO since it was not listed on the medications for him to take orally. Will follow up with inpatient team for further instructions.

## 2023-03-22 ENCOUNTER — PATIENT MESSAGE (OUTPATIENT)
Dept: ADMINISTRATIVE | Facility: OTHER | Age: 68
End: 2023-03-22
Payer: MEDICARE

## 2023-03-22 DIAGNOSIS — E11.65 UNCONTROLLED TYPE 2 DIABETES MELLITUS WITH HYPERGLYCEMIA: ICD-10-CM

## 2023-03-22 RX ORDER — LANCETS
EACH MISCELLANEOUS
Qty: 100 EACH | Refills: 1 | Status: SHIPPED | OUTPATIENT
Start: 2023-03-22 | End: 2023-03-24 | Stop reason: SDUPTHER

## 2023-03-22 NOTE — PROGRESS NOTES
C3 nurse spoke with Lukasz Person for a TCC post hospital discharge follow up call. The patient has a scheduled HOSFU appointment with Kirk Wilson MD on 03/30/23 @ [id number].

## 2023-03-22 NOTE — TELEPHONE ENCOUNTER
Care Due:                  Date            Visit Type   Department     Provider  --------------------------------------------------------------------------------                                EP -                              PRIMARY      [facility] INTERNAL  Last Visit: 12-      CARE ([facility])   MEDICINE       [facility] INTER[facility]  Next Visit: 03-      FOLLOW UP    MEDICINE       [facility]                                                            Last  Test          Frequency    Reason                     Performed    Due Date  --------------------------------------------------------------------------------    Uric Acid...  12 months..  allopurinoL..............  02-   02-    [facility] Embedded Care Gaps. Reference number: [id number]. 3/22/2023   3:50:12 PM CDT

## 2023-03-22 NOTE — TELEPHONE ENCOUNTER
Refill for lancets and diagnostic strips, previously rx'd for once daily, now at 6x's daily. Please send new rx reflecting this change - route to pcp        Tylenol liquid  Melatonin otc  -not yet taking either OTC

## 2023-03-22 NOTE — TELEPHONE ENCOUNTER
Pt states current rx for lancets and diagnostic strips were rx'd for daily checks. Pt currently advised to check 6 times daily. Pt is needing rx to accommodate this change. RX updated and resubmitted.

## 2023-03-24 ENCOUNTER — TELEPHONE (OUTPATIENT)
Dept: ADMINISTRATIVE | Facility: CLINIC | Age: 68
End: 2023-03-24
Payer: MEDICARE

## 2023-03-24 ENCOUNTER — PATIENT MESSAGE (OUTPATIENT)
Dept: SURGERY | Facility: CLINIC | Age: 68
End: 2023-03-24
Payer: MEDICARE

## 2023-03-24 ENCOUNTER — PATIENT MESSAGE (OUTPATIENT)
Dept: ADMINISTRATIVE | Facility: OTHER | Age: 68
End: 2023-03-24
Payer: MEDICARE

## 2023-03-24 DIAGNOSIS — E11.65 UNCONTROLLED TYPE 2 DIABETES MELLITUS WITH HYPERGLYCEMIA: ICD-10-CM

## 2023-03-24 RX ORDER — LANCETS
EACH MISCELLANEOUS
Qty: 400 EACH | Refills: 1 | Status: SHIPPED | OUTPATIENT
Start: 2023-03-24 | End: 2023-09-01 | Stop reason: SDUPTHER

## 2023-03-24 NOTE — TELEPHONE ENCOUNTER
No new care gaps identified.  [facility] Embedded Care Gaps. Reference number: [id number]. 3/24/2023   10:01:05 AM ESTEFANIA

## 2023-03-24 NOTE — PROGRESS NOTES
"Phoned patient in response to reply of "3" to post-discharge texting tracker received after hours on 3/23. Mr. Person and his spouse, Mrs. Faviola Person requested clarification on how often he is to test his blood sugar. He said he also needed a new RX for test strips, as the current prescription is for once daily testing. Per medications tab, the Accu-chek Roula test strips were e-prescribed on 3/22 to preferred [facility] for QID testing. A qty of 100 with 1 refill was ordered.Mrs. Person then stated that when she called [facility], she was told that the prescription would cost over $200. I phoned [facility] and spoke to Cira who stated that they did receive the new RX for QID testing but it had not been filled. She processed while I held and she received a rejection message from the insurance that only a 90 days supply would be covered. She stated the MD would have to send a new RX for a qty of 400 so that it could process for the correct days supply. Sent message to Dr. Mcpherson on the patient's behalf with the pharmacy's request to increase the qty for the desired days supply. Included patient's direct contact info. Phoned  and Mrs. Person back to give them the update. Both verbalized understanding.      "

## 2023-03-25 ENCOUNTER — PATIENT MESSAGE (OUTPATIENT)
Dept: ADMINISTRATIVE | Facility: OTHER | Age: 68
End: 2023-03-25
Payer: MEDICARE

## 2023-03-26 ENCOUNTER — PATIENT MESSAGE (OUTPATIENT)
Dept: ADMINISTRATIVE | Facility: OTHER | Age: 68
End: 2023-03-26
Payer: MEDICARE

## 2023-03-27 ENCOUNTER — OFFICE VISIT (OUTPATIENT)
Dept: SURGERY | Facility: CLINIC | Age: 68
End: 2023-03-27
Payer: MEDICARE

## 2023-03-27 ENCOUNTER — PATIENT MESSAGE (OUTPATIENT)
Dept: ADMINISTRATIVE | Facility: OTHER | Age: 68
End: 2023-03-27
Payer: MEDICARE

## 2023-03-27 ENCOUNTER — CLINICAL SUPPORT (OUTPATIENT)
Dept: HEMATOLOGY/ONCOLOGY | Facility: CLINIC | Age: 68
End: 2023-03-27
Payer: MEDICARE

## 2023-03-27 ENCOUNTER — HOSPITAL ENCOUNTER (OUTPATIENT)
Dept: RADIOLOGY | Facility: HOSPITAL | Age: 68
Discharge: HOME | End: 2023-03-27
Payer: MEDICARE

## 2023-03-27 VITALS
HEIGHT: 72 IN | SYSTOLIC BLOOD PRESSURE: 126 MMHG | DIASTOLIC BLOOD PRESSURE: 59 MMHG | BODY MASS INDEX: 31.27 KG/M2 | HEART RATE: 81 BPM | OXYGEN SATURATION: 97 % | WEIGHT: 230.88 LBS

## 2023-03-27 DIAGNOSIS — C15.9 ESOPHAGEAL ADENOCARCINOMA: Primary | ICD-10-CM

## 2023-03-27 DIAGNOSIS — N18.30 TYPE 2 DIABETES MELLITUS WITH STAGE 3 CHRONIC KIDNEY DISEASE, WITHOUT LONG-TERM CURRENT USE OF INSULIN, UNSPECIFIED WHETHER STAGE 3A OR 3B CKD: Chronic | ICD-10-CM

## 2023-03-27 DIAGNOSIS — C15.9 ESOPHAGEAL ADENOCARCINOMA: Primary | Chronic | ICD-10-CM

## 2023-03-27 DIAGNOSIS — Z71.3 NUTRITIONAL COUNSELING: ICD-10-CM

## 2023-03-27 DIAGNOSIS — E11.22 TYPE 2 DIABETES MELLITUS WITH STAGE 3 CHRONIC KIDNEY DISEASE, WITHOUT LONG-TERM CURRENT USE OF INSULIN, UNSPECIFIED WHETHER STAGE 3A OR 3B CKD: Chronic | ICD-10-CM

## 2023-03-27 DIAGNOSIS — C15.9 ESOPHAGEAL ADENOCARCINOMA: ICD-10-CM

## 2023-03-27 DIAGNOSIS — J38.3: ICD-10-CM

## 2023-03-27 DIAGNOSIS — G47.33 OSA ON CPAP: Chronic | ICD-10-CM

## 2023-03-27 PROCEDURE — 25500020 PHARM REV CODE 255: Performed by: NURSE PRACTITIONER

## 2023-03-27 PROCEDURE — 99999 PR PBB SHADOW E&M-EST. PATIENT-LVL IV: CPT | Mod: PBBFAC,,, | Performed by: STUDENT IN AN ORGANIZED HEALTH CARE EDUCATION/TRAINING PROGRAM

## 2023-03-27 PROCEDURE — 74220 FL ESOPHAGRAM COMPLETE: ICD-10-PCS | Mod: 26,,, | Performed by: RADIOLOGY

## 2023-03-27 PROCEDURE — 74220 X-RAY XM ESOPHAGUS 1CNTRST: CPT | Mod: TC

## 2023-03-27 PROCEDURE — 99024 PR POST-OP FOLLOW-UP VISIT: ICD-10-PCS | Mod: POP,,, | Performed by: STUDENT IN AN ORGANIZED HEALTH CARE EDUCATION/TRAINING PROGRAM

## 2023-03-27 PROCEDURE — 99024 POSTOP FOLLOW-UP VISIT: CPT | Mod: POP,,, | Performed by: STUDENT IN AN ORGANIZED HEALTH CARE EDUCATION/TRAINING PROGRAM

## 2023-03-27 PROCEDURE — 99214 OFFICE O/P EST MOD 30 MIN: CPT | Mod: PBBFAC | Performed by: STUDENT IN AN ORGANIZED HEALTH CARE EDUCATION/TRAINING PROGRAM

## 2023-03-27 PROCEDURE — 99999 PR PBB SHADOW E&M-EST. PATIENT-LVL IV: ICD-10-PCS | Mod: PBBFAC,,, | Performed by: STUDENT IN AN ORGANIZED HEALTH CARE EDUCATION/TRAINING PROGRAM

## 2023-03-27 PROCEDURE — 74220 X-RAY XM ESOPHAGUS 1CNTRST: CPT | Mod: 26,,, | Performed by: RADIOLOGY

## 2023-03-27 PROCEDURE — 97803 MED NUTRITION INDIV SUBSEQ: CPT | Mod: PBBFAC | Performed by: DIETITIAN, REGISTERED

## 2023-03-27 RX ORDER — HYDROCODONE BITARTRATE AND ACETAMINOPHEN 7.5; 325 MG/15ML; MG/15ML
15 SOLUTION ORAL EVERY 8 HOURS PRN
Qty: 118 ML | Refills: 0 | Status: SHIPPED | OUTPATIENT
Start: 2023-03-27 | End: 2023-04-03 | Stop reason: ALTCHOICE

## 2023-03-27 RX ADMIN — IOHEXOL 95 ML: 350 INJECTION, SOLUTION INTRAVENOUS at 08:03

## 2023-03-27 NOTE — PROGRESS NOTES
Oncology Nutrition Assessment for Medical Nutrition Therapy  Follow-up Visit    Lukasz Person   1955    Referring Provider: No ref. provider found      Reason for Visit: nutrition counseling and education    PMHx:   Past Medical History:   Diagnosis Date    Anticoagulant long-term use     Diabetes mellitus     Onset late 50s/early 60s    Hyperlipidemia     Hypertension     Onset late 50s/early 60s    Kidney stones     Sleep apnea     since 2006       Nutrition Assessment    This is a 68 y.o.male with a medical diagnosis of esophageal adenocarcinoma s/p neoadjuvant chemoradiation and now s/p esophagectomy 3/14. He was discharged NPO and on TF of [employer] Standard 1.4 at 80mL/hr x 18hrs (provides 2016 kcal/day and 89g protein/day) + water flushes at 55mL/hr for every hour on feeds. He is known to me from previous appointment. Esophagram this morning, cleared for CL diet per surg onc. He reports that TF had been going well but recently having some issues with discomfort after about 8hrs. He reports it is right around the tube site that he feels discomfort, also notes some drainage. He starts TF at 2p then around 10p stops it and takes a 2-4hr break. He will then turn it back on at 70mL/hr. He reports he tolerates from then fine. He is not getting a full 4 cartons/day though with lowered rate and less time on pump.    Weight: 104.7 kg (230 lb 14.4 oz)  Height: 6' (182.9 cm)  BMI: 31.32    Usual BW: 260-265lb  Weight Change: 30lb loss over 3 months (15lb of which were lost after surgery)    Allergies: Patient has no known allergies.    Current Medications:  Current Outpatient Medications:     allopurinoL (ZYLOPRIM) 100 MG tablet, TAKE 1 TABLET BY MOUTH EVERY DAY, Disp: 30 tablet, Rfl: 10    blood sugar diagnostic (ACCU-CHEK ANTONELLA PLUS TEST STRP) Strp, Use to test CBG four times daily E11.65, Disp: 400 strip, Rfl: 1    blood-glucose meter kit, Checks blood sugars 1x/daily., Disp: 1 each, Rfl: 12    citric  acid-potassium citrate (POLYCITRA) 1,100-334 mg/5 mL solution, Take 5 mLs (10 mEq total) by mouth 3 (three) times daily with meals. (Patient not taking: Reported on 3/22/2023), Disp: 450 mL, Rfl: 6    diphenhydrAMINE-aluminum-magnesium hydroxide-simethicone-LIDOcaine HCl 2%, Swish and swallow 10 mLs every 6 (six) hours as needed (pain swallowing). (Patient not taking: Reported on 3/7/2023), Disp: 360 each, Rfl: 0    glimepiride (AMARYL) 2 MG tablet, TAKE 2 TABLETS BY MOUTH EVERY MORNING (Patient not taking: Reported on 3/22/2023), Disp: 180 tablet, Rfl: 2    hydrocodone-acetaminophen (HYCET) solution 7.5-325 mg/15mL, 15 mLs by Per J Tube route every 6 (six) hours as needed for Pain., Disp: 118 mL, Rfl: 0    lancets (ACCU-CHEK SOFTCLIX LANCETS) Misc, Use to test CBG 4 times daily E11.65, Disp: 400 each, Rfl: 1    lisinopriL-hydrochlorothiazide (PRINZIDE,ZESTORETIC) 20-25 mg Tab, TAKE 1 TABLET BY MOUTH EVERY DAY, Disp: 90 tablet, Rfl: 3    magic mouthwash diphen/antac/lidoc, Swish and swallow 10 ML's every 6 hours as needed (pain swallowing) (Patient not taking: Reported on 3/22/2023), Disp: 360 mL, Rfl: 0    metFORMIN 500 mg/5 mL Soln, Take 1,000 mg by mouth 2 (two) times a day. (Patient not taking: Reported on 3/22/2023), Disp: 1800 mL, Rfl: 3    metoprolol succinate (TOPROL-XL) 25 MG 24 hr tablet, Take 1 tablet (25 mg total) by mouth once daily., Disp: 30 tablet, Rfl: 11    nitroGLYCERIN (NITROSTAT) 0.4 MG SL tablet, Place 1 tablet (0.4 mg total) under the tongue every 5 (five) minutes as needed for Chest pain. If you need a third tablet, call 911, Disp: 60 tablet, Rfl: 12    omeprazole (PRILOSEC) 40 MG capsule, Take 1 capsule (40 mg total) by mouth once daily., Disp: 90 capsule, Rfl: 3    rivaroxaban (XARELTO) 20 mg Tab, Take 1 tablet (20 mg total) by mouth daily with dinner or evening meal., Disp: 30 tablet, Rfl: 11    rosuvastatin (CRESTOR) 20 MG tablet, TAKE 1 TABLET(20 MG) BY MOUTH EVERY DAY (Patient taking  differently: Take 20 mg by mouth every evening.), Disp: 30 tablet, Rfl: 11    tamsulosin (FLOMAX) 0.4 mg Cap, Take 1 capsule (0.4 mg total) by mouth once daily. (Patient taking differently: Take 0.4 mg by mouth nightly.), Disp: 30 capsule, Rfl: 11    testosterone (ANDROGEL) 20.25 mg/1.25 gram (1.62 %) GlPm, Apply 4 pumps to shoulders daily, Disp: 2 each, Rfl: 5    Food/medication interactions noted: none    Vitamins/Supplements: none    Labs: Reviewed    Nutrition Diagnosis    Problem: altered GI function  Etiology (related to):  NPO s/p esophagectomy  Signs/Symptoms (as evidenced by): requiring tube feeding for nutrition support    Nutrition Intervention    Nutrition Prescription   2408 kcals (23kcal/kg)  94g protein (0.9g/kg)   2408mL fluid (23mL/kg)    Recommendations:  Continue TF - aim for at least 4 cartons/day (lengthen time if needed since lowering rate to 70mL/hr)  Continue water flushes at 55mL/hr for every hour on feeds  Flush J-tube with at least 60mL water before and after feeds  CL diet per surg onc    Nutrition Monitoring and Evaluation    Monitor: energy intake, rate/formulation of tube feeding, and weight status    Goals: weight maintenance    Follow up: in 1-2 weeks    Communication to referring provider/care team: note available in chart    Counseling time: 15 minutes    Carmen Clark MS, RD, LDN  [phone]

## 2023-03-27 NOTE — PROGRESS NOTES
Surgical Oncology Clinic Note  [facility]       Referring Provider: Dr. Kirk Wilson   PCP: Kirk Wilson MD    Reason For Visit: sp esophagectomy for esophageal adenocarcinoma    Oncologic History:  8/2022: PCP for progressive dysphagia  11/17/22: EGD:nodular mucosa in the esophagus at the GE junction. Esophageal mucosal changes suggestive of short-segment Paredes's esophagus. Biopsies positive for adenocarcinoma.   12/7/22: EUS: Partially obstructing, malignant esophageal tumor was found in the lower third of the esophagus staged T3 N0 Mx by endosonographic criteria.   12/8/22: PET CT: Distal esophageal wall thickening and hypermetabolic activity in keeping with known esophageal cancer with no definite evidence of metastatic disease  3/14/22: robo esophagectomy w j tube creation  3/27/23: Esophragram with no evidence of postoperative leak.    History of Present Illness:  Mr. Person is a 67 y.o. male who presents with newly diagnosed esophageal adenocarcinoma of the GE junction. He reports that beginning in late July 2022 he noticed trouble swallowing certain foods- 8/2022 saw his PCP as this problem persisted. EGD noted a nodular mucosal change at  the GE junction. Biopsy taken revealed adenocarcinoma. Subsequent EUS noted a partially obstructing tumor in the lower third of the esophagus that was staged T3N0Mx by endosonographic criteria.      He reports that he is able to eat many food but avoids foods such as steak and breads as well as some liquids and carbonated beverages. He has lost 3-5 lbs since July. Reports usual activity and energy level. Denies N/V/D, chest pain, SOB.     Interval Since Last Visit:  1/30/23: Lukasz Person returns today for follow-up after 4 cycles of neoadj chemo and radiation that began 12/29/22.  Final RT 2/1/23, last cycles of chemo held for thrombocytopenia.  Has been cleared by cardiology - does not need repeat echo (EF 60% <1 year ago) and to hold Xarelto 3 days prior.  Scheduled  "for PFTs 2/27/23.  Scheduled for re-staging imaging 3/1 and 3/2. Doing well with chemoRT. Having indigestion relieved with tums and hiccups with eating. Having odynophagia. Lost about 12lbs. Overall doing well with treatment.     2/27/23:  Here for pre op for robotic esophagectomy. Discussed surgery, hospital stay, recovery, lifestyle changes in great detail with patient and wife. He is reporting a "boil" on his right buttock that he noticed starting Thursday after a long drive to [place] last week. He reports a tender area that has not improved and has noted some pinkish drainage from the site. Denies fever, chills, nausea, vomiting, abdominal pain. Still reporting odynophagia.    3/27/23- post operative visit: Has been having pain with tube feeds as he advanced them up to 80. Decreased to 70, with resolution of pain. Experiencing some hoarseness, but otherwise doing well. Denies fever, nausea, vomiting, incisional drainage.      Pathology:  11/17/22: From EGD:   Final Pathologic Diagnosis        Abnormal   Gastroesophageal junction, biopsy:   - Adenocarcinoma.   - Background intestinal metaplasia with low and high-grade dysplasia.   - Focally suspicious for lymphovascular invasion.   - MMR pending, results will be issued in addendum.   Immunohistochemistry (IHC) Testing for Mismatch Repair (MMR) Proteins:   MLH1 - Intact nuclear expression   MSH2 - Intact nuclear expression   MSH6 - Intact nuclear expression   PMS2 - Intact nuclear expression   Background nonneoplastic tissue/internal control with intact nuclear   expression   IHC Interpretation   No loss of nuclear expression of MMR proteins: low probability of   microsatellite instability     Staging:  Cancer Staging   Esophageal adenocarcinoma  Staging form: Esophagus - Adenocarcinoma, AJCC 8th Edition  - Clinical stage from 11/17/2022: Stage III (cT3, cN0, cM0) - Signed by Javier Moulton MD on 12/14/2022         Current Outpatient Medications:     " allopurinoL (ZYLOPRIM) 100 MG tablet, TAKE 1 TABLET BY MOUTH EVERY DAY, Disp: 30 tablet, Rfl: 10    blood sugar diagnostic (ACCU-CHEK ANTONELLA PLUS TEST STRP) Strp, Use to test CBG four times daily E11.65, Disp: 400 strip, Rfl: 1    blood-glucose meter kit, Checks blood sugars 1x/daily., Disp: 1 each, Rfl: 12    hydrocodone-acetaminophen (HYCET) solution 7.5-325 mg/15mL, 15 mLs by Per J Tube route every 8 (eight) hours as needed for Pain., Disp: 118 mL, Rfl: 0    lancets (ACCU-CHEK SOFTCLIX LANCETS) Misc, Use to test CBG 4 times daily E11.65, Disp: 400 each, Rfl: 1    lisinopriL-hydrochlorothiazide (PRINZIDE,ZESTORETIC) 20-25 mg Tab, TAKE 1 TABLET BY MOUTH EVERY DAY, Disp: 90 tablet, Rfl: 3    metoprolol succinate (TOPROL-XL) 25 MG 24 hr tablet, Take 1 tablet (25 mg total) by mouth once daily., Disp: 30 tablet, Rfl: 11    nitroGLYCERIN (NITROSTAT) 0.4 MG SL tablet, Place 1 tablet (0.4 mg total) under the tongue every 5 (five) minutes as needed for Chest pain. If you need a third tablet, call 911, Disp: 60 tablet, Rfl: 12    omeprazole (PRILOSEC) 40 MG capsule, Take 1 capsule (40 mg total) by mouth once daily., Disp: 90 capsule, Rfl: 3    rivaroxaban (XARELTO) 20 mg Tab, Take 1 tablet (20 mg total) by mouth daily with dinner or evening meal., Disp: 30 tablet, Rfl: 11    rosuvastatin (CRESTOR) 20 MG tablet, TAKE 1 TABLET(20 MG) BY MOUTH EVERY DAY (Patient taking differently: Take 20 mg by mouth every evening.), Disp: 30 tablet, Rfl: 11    tamsulosin (FLOMAX) 0.4 mg Cap, Take 1 capsule (0.4 mg total) by mouth once daily. (Patient taking differently: Take 0.4 mg by mouth nightly.), Disp: 30 capsule, Rfl: 11    testosterone (ANDROGEL) 20.25 mg/1.25 gram (1.62 %) GlPm, Apply 4 pumps to shoulders daily, Disp: 2 each, Rfl: 5    citric acid-potassium citrate (POLYCITRA) 1,100-334 mg/5 mL solution, Take 5 mLs (10 mEq total) by mouth 3 (three) times daily with meals. (Patient not taking: Reported on 3/22/2023), Disp: 450 mL, Rfl:  6    diphenhydrAMINE-aluminum-magnesium hydroxide-simethicone-LIDOcaine HCl 2%, Swish and swallow 10 mLs every 6 (six) hours as needed (pain swallowing). (Patient not taking: Reported on 3/7/2023), Disp: 360 each, Rfl: 0    glimepiride (AMARYL) 2 MG tablet, TAKE 2 TABLETS BY MOUTH EVERY MORNING (Patient not taking: Reported on 3/22/2023), Disp: 180 tablet, Rfl: 2    magic mouthwash diphen/antac/lidoc, Swish and swallow 10 ML's every 6 hours as needed (pain swallowing) (Patient not taking: Reported on 3/22/2023), Disp: 360 mL, Rfl: 0    metFORMIN 500 mg/5 mL Soln, Take 1,000 mg by mouth 2 (two) times a day. (Patient not taking: Reported on 3/22/2023), Disp: 1800 mL, Rfl: 3  No current facility-administered medications for this visit.    Review of patient's allergies indicates:  No Known Allergies    Past Medical History:   Diagnosis Date    Anticoagulant long-term use     Diabetes mellitus     Onset late 50s/early 60s    Hyperlipidemia     Hypertension     Onset late 50s/early 60s    Kidney stones     Sleep apnea     since 2006       Past Surgical History:   Procedure Laterality Date    CORONARY ANGIOGRAPHY N/A 9/30/2020    Procedure: ANGIOGRAM, CORONARY ARTERY;  Surgeon: John West MD;  Location: [facility] CATH LAB;  Service: Cardiology;  Laterality: N/A;    CYSTOSCOPY N/A 2/17/2022    Procedure: CYSTOSCOPY;  Surgeon: Lukasz Hughes MD;  Location: [address];  Service: Urology;  Laterality: N/A;    CYSTOSCOPY W/ URETERAL STENT PLACEMENT N/A 2/25/2022    Procedure: CYSTOSCOPY, WITH URETERAL STENT INSERTION;  Surgeon: Lukasz Hughes MD;  Location: [facility] OR [address] [place] [address];  Service: Urology;  Laterality: N/A;    ENDOSCOPIC ULTRASOUND OF UPPER GASTROINTESTINAL TRACT N/A 12/7/2022    Procedure: ULTRASOUND, UPPER GI TRACT, ENDOSCOPIC;  Surgeon: Darian Main MD;  Location: [facility] ENDO;  Service: Endoscopy;  Laterality: N/A;  Approval to hold Xagreerto rec'd from Dr. Nick (see t/e 12/5/22)-DS     ESOPHAGOGASTRODUODENOSCOPY N/A 11/17/2022    Procedure: EGD (ESOPHAGOGASTRODUODENOSCOPY);  Surgeon: Brody Gonzales MD;  Location: [facility] ENDO (2ND FLR);  Service: Endoscopy;  Laterality: N/A;  inst via email-ok to hold Xarelto x 2 days-MS    LASER LITHOTRIPSY Left 2/25/2022    Procedure: LITHOTRIPSY, USING LASER;  Surgeon: Lukasz Hughes MD;  [facility];  Service: Urology;  Laterality: Left;    LEFT HEART CATHETERIZATION Left 9/30/2020    Procedure: Left heart cath;  Surgeon: John West MD;  Location: [facility] CATH LAB;  Service: Cardiology;  Laterality: Left;    LITHOTRIPSY      PARATHYROIDECTOMY  1/1/2-107    PYELOSCOPY Left 2/25/2022    Procedure: PYELOSCOPY;  Surgeon: Lukasz Hughes MD;  Location: [facility] OR [address] [place] [address];  Service: Urology;  Laterality: Left;    ROBOT-ASSISTED SURGICAL REMOVAL OF ESOPHAGUS USING DA CARLOS XI N/A 3/14/2023    Procedure: XI ROBOTIC ESOPHAGECTOMY;  Surgeon: Santana Brown Jr., MD;  [facility];  Service: General;  Laterality: N/A;  Abdomen, Chest and Neck    ROBOTIC JEJUNOSTOMY N/A 3/14/2023    Procedure: ROBOTIC JEJUNOSTOMY TUBE INSERTION;  Surgeon: Santana Brown Jr., MD;  [facility];  Service: General;  Laterality: N/A;    TRANSESOPHAGEAL ECHOCARDIOGRAPHY N/A 4/7/2022    Procedure: ECHOCARDIOGRAM, TRANSESOPHAGEAL;  Surgeon: [facility];  Location: [facility] EP LAB;  Service: Cardiology;  Laterality: N/A;    TREATMENT OF CARDIAC ARRHYTHMIA N/A 4/7/2022    Procedure: Cardioversion or Defibrillation;  Surgeon: Iris Fisher NP;  Location: [facility] EP LAB;  Service: Cardiology;  Laterality: N/A;  afib, dccv, artie, anes, EH, 3prep    URETEROSCOPIC REMOVAL OF URETERIC CALCULUS Left 2/25/2022    Procedure: REMOVAL, CALCULUS, URETER, URETEROSCOPIC;  Surgeon: Lukasz Hughes MD;  Location: [address];  Service: Urology;  Laterality: Left;    URETEROSCOPY Left 2/25/2022    Procedure: URETEROSCOPY;  Surgeon: Lukasz HARRIS  MD Saul;  Location: [facility] OR [address] [place] [address];  Service: Urology;  Laterality: Left;    VOCAL CORD INJECTION Left 3/17/2023    Procedure: INJECTION, VOCAL CORD, LARYNGOSCOPIC;  Surgeon: Gm Altman MD;  Location: [facility] OR [address] [place] [address];  Service: ENT;  Laterality: Left;       Family History   Problem Relation Age of Onset    Arthritis Mother     Heart disease Father 70        CABG    Arthritis Father     Diabetes Father     Kidney disease Father         had one kidney removed in early thirties    Arthritis Sister     Arthritis Sister     Hypertension Sister     Colon cancer Brother 32    Arthritis Brother     Alcohol abuse Paternal Aunt     Cancer Maternal Grandfather         throat cancer    Diabetes Paternal Grandmother     Colon polyps Paternal Grandfather     Colon cancer Paternal Grandfather     Cancer Paternal Grandfather         colon cancer at age 62       Social History     Socioeconomic History    Marital status:      Spouse name: Amanda   Tobacco Use    Smoking status: Former     Packs/day: 1.00     Years: 7.00     Pack years: 7.00     Types: Cigarettes, Cigars     Start date: 1972     Quit date:      Years since quittin.8     Passive exposure: Never    Smokeless tobacco: Never    Tobacco comments:     smoking was off and on.  cumulative 7 years.  never more than three years in one stretch or more lj   Substance and Sexual Activity    Alcohol use: Yes     Alcohol/week: 3.0 standard drinks     Types: 2 Cans of beer, 1 Shots of liquor per week     Comment: don't drink regularly.  6 to 7 monthly    Drug use: Not Currently     Types: Marijuana    Sexual activity: Not Currently     Partners: Female     Birth control/protection: None     Comment:      Social Determinants of Health     Financial Resource Strain: Low Risk     Difficulty of Paying Living Expenses: Not very hard   Food Insecurity: No Food Insecurity    Worried About Running Out of Food in the Last Year: Never true    Ran Out  of Food in the Last Year: Never true   Transportation Needs: No Transportation Needs    Lack of Transportation (Medical): No    Lack of Transportation (Non-Medical): No   Physical Activity: Sufficiently Active    Days of Exercise per Week: 4 days    Minutes of Exercise per Session: 40 min   Stress: No Stress Concern Present    Feeling of Stress : Only a little   Social Connections: Unknown    Frequency of Communication with Friends and Family: Once a week    Frequency of Social Gatherings with Friends and Family: Once a week    Active Member of Clubs or Organizations: Yes    Attends Club or Organization Meetings: More than 4 times per year    Marital Status:    Housing Stability: Low Risk     Unable to Pay for Housing in the Last Year: No    Number of Places Lived in the Last Year: 1    Unstable Housing in the Last Year: No             Vitals:    03/27/23 1127   BP: (!) 126/59   Pulse: 81     Body mass index is 31.32 kg/m².    Physical Exam  Vitals and nursing note reviewed.   Constitutional:       General: He is not in acute distress.     Appearance: Normal appearance. He is not ill-appearing.   HENT:      Head: Normocephalic.      Mouth/Throat:      Mouth: Mucous membranes are moist.      Pharynx: Oropharynx is clear.      Comments: Voice hoarse  Eyes:      General: No scleral icterus.     Extraocular Movements: Extraocular movements intact.      Conjunctiva/sclera: Conjunctivae normal.   Cardiovascular:      Rate and Rhythm: Normal rate.      Pulses: Normal pulses.   Pulmonary:      Effort: Pulmonary effort is normal. No respiratory distress.      Breath sounds: No wheezing.   Abdominal:      General: Abdomen is flat. There is no distension.      Palpations: Abdomen is soft.      Tenderness: There is no guarding.   Musculoskeletal:         General: No swelling or tenderness. Normal range of motion.      Cervical back: Normal range of motion.   Skin:     General: Skin is warm and dry.      Coloration: Skin  is not jaundiced or pale.      Comments: Surgical incisions CDI without signs of drainage or infection.   Neurological:      General: No focal deficit present.      Mental Status: He is alert and oriented to person, place, and time.   Psychiatric:         Mood and Affect: Mood normal.           DATA REVIEW:  I reviewed the following records for this visit: lab work from prior visit, notes from other physicians, surgical pathology results, endoscopy results, radiographic study evaluation, and laboratory results done by primary care physician      Lab Results   Component Value Date    WBC 4.09 03/20/2023    HGB 10.5 (L) 03/20/2023    HCT 33.4 (L) 03/20/2023     (L) 03/20/2023    CHOL 107 (L) 11/07/2022    TRIG 218 (H) 11/07/2022    HDL 34 (L) 11/07/2022    LDLCALC 29.4 (L) 11/07/2022    ALT 71 (H) 03/20/2023    AST 31 03/20/2023     03/20/2023    K 4.1 03/20/2023     03/20/2023    CREATININE 0.8 03/20/2023    BUN 14 03/20/2023    CO2 26 03/20/2023    TSH 1.634 03/01/2023    PSA 1.2 10/06/2021    INR 1.1 04/01/2022    HGBA1C 6.6 (H) 03/14/2023       No results found for: CEA, , , AFP     Radiology:   Esophagram 3/27/23: no evidence of postoperative leak      ASSESSMENT & PLAN:  Lukasz Person is a 68 y.o. male with Siewert I esophageal adenocarcinoma, clinical stage T3N0M0.  Enrolled in clinical trial, completed chemoRT. Sp esophagectomy 3/14. Esophagram showing no signs of leak. Overall doing well.    - Advance to clear liquid diet. Continue tube feeds at 70.   - Okay to take meds   - Refrain from CPAP this week- will reassess next week at f/u    Follow-up: one week      Neda Coelho MD  General Surgery, PGY-1  [facility]

## 2023-03-28 ENCOUNTER — TELEPHONE (OUTPATIENT)
Dept: HEMATOLOGY/ONCOLOGY | Facility: CLINIC | Age: 68
End: 2023-03-28
Payer: MEDICARE

## 2023-03-28 ENCOUNTER — PATIENT MESSAGE (OUTPATIENT)
Dept: ADMINISTRATIVE | Facility: OTHER | Age: 68
End: 2023-03-28
Payer: MEDICARE

## 2023-03-28 DIAGNOSIS — C15.9 ESOPHAGEAL ADENOCARCINOMA: Primary | ICD-10-CM

## 2023-03-28 DIAGNOSIS — Z00.6 CLINICAL TRIAL PARTICIPANT: ICD-10-CM

## 2023-03-28 NOTE — TELEPHONE ENCOUNTER
----- Message from Maira Esther Stahl sent at 3/28/2023 12:37 PM CDT -----  Hey lady!!    Can you do me a favor and call Mr. Person to schedule his post-op CT scan? We don't know when we're going to start him so I recommend the week of the 10th.  I appreciate you soooooo much!    Gilda

## 2023-03-29 ENCOUNTER — PATIENT MESSAGE (OUTPATIENT)
Dept: ADMINISTRATIVE | Facility: OTHER | Age: 68
End: 2023-03-29
Payer: MEDICARE

## 2023-03-30 ENCOUNTER — PES CALL (OUTPATIENT)
Dept: ADMINISTRATIVE | Facility: CLINIC | Age: 68
End: 2023-03-30
Payer: MEDICARE

## 2023-03-30 ENCOUNTER — OFFICE VISIT (OUTPATIENT)
Dept: INTERNAL MEDICINE | Facility: CLINIC | Age: 68
End: 2023-03-30
Payer: MEDICARE

## 2023-03-30 ENCOUNTER — PATIENT MESSAGE (OUTPATIENT)
Dept: ADMINISTRATIVE | Facility: OTHER | Age: 68
End: 2023-03-30
Payer: MEDICARE

## 2023-03-30 VITALS
DIASTOLIC BLOOD PRESSURE: 64 MMHG | OXYGEN SATURATION: 97 % | SYSTOLIC BLOOD PRESSURE: 122 MMHG | WEIGHT: 231.5 LBS | BODY MASS INDEX: 31.36 KG/M2 | HEIGHT: 72 IN | HEART RATE: 78 BPM | RESPIRATION RATE: 20 BRPM | TEMPERATURE: 99 F

## 2023-03-30 DIAGNOSIS — Z90.49 H/O ESOPHAGECTOMY: ICD-10-CM

## 2023-03-30 DIAGNOSIS — Z00.6 CLINICAL TRIAL PARTICIPANT: ICD-10-CM

## 2023-03-30 DIAGNOSIS — I70.0 AORTIC ATHEROSCLEROSIS: ICD-10-CM

## 2023-03-30 DIAGNOSIS — Z79.899 ON ANTINEOPLASTIC CHEMOTHERAPY: ICD-10-CM

## 2023-03-30 DIAGNOSIS — C15.9 ESOPHAGEAL ADENOCARCINOMA: Primary | ICD-10-CM

## 2023-03-30 DIAGNOSIS — G47.33 OSA ON CPAP: Chronic | ICD-10-CM

## 2023-03-30 DIAGNOSIS — D84.81 IMMUNODEFICIENCY SECONDARY TO NEOPLASM: ICD-10-CM

## 2023-03-30 DIAGNOSIS — Z98.890 H/O ESOPHAGECTOMY: ICD-10-CM

## 2023-03-30 DIAGNOSIS — C15.9 ESOPHAGEAL ADENOCARCINOMA: ICD-10-CM

## 2023-03-30 DIAGNOSIS — Z09 HOSPITAL DISCHARGE FOLLOW-UP: Primary | ICD-10-CM

## 2023-03-30 DIAGNOSIS — D49.9 IMMUNODEFICIENCY SECONDARY TO NEOPLASM: ICD-10-CM

## 2023-03-30 DIAGNOSIS — E21.0 HYPERPARATHYROIDISM, PRIMARY: ICD-10-CM

## 2023-03-30 PROCEDURE — 99999 PR PBB SHADOW E&M-EST. PATIENT-LVL IV: ICD-10-PCS | Mod: PBBFAC,,, | Performed by: INTERNAL MEDICINE

## 2023-03-30 PROCEDURE — 99214 OFFICE O/P EST MOD 30 MIN: CPT | Mod: PBBFAC,PO | Performed by: INTERNAL MEDICINE

## 2023-03-30 PROCEDURE — 99495 TRANSJ CARE MGMT MOD F2F 14D: CPT | Mod: S$PBB,,, | Performed by: INTERNAL MEDICINE

## 2023-03-30 PROCEDURE — 99495 TCM SERVICES (MODERATE COMPLEXITY): ICD-10-PCS | Mod: S$PBB,,, | Performed by: INTERNAL MEDICINE

## 2023-03-30 PROCEDURE — 99999 PR PBB SHADOW E&M-EST. PATIENT-LVL IV: CPT | Mod: PBBFAC,,, | Performed by: INTERNAL MEDICINE

## 2023-03-30 NOTE — PROGRESS NOTES
Subjective:       Patient ID: Lukasz Person is a 68 y.o. male.    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  Discharge 3/20/23    Family and/or Caretaker present at visit?  No.  Diagnostic tests reviewed/disposition: I have reviewed all completed as well as pending diagnostic tests at the time of discharge.  Disease/illness education: esophagectomy  Home health/community services discussion/referrals: Patient has home health established at [facility] .   Establishment or re-establishment of referral orders for community resources: No other necessary community resources.   Discussion with other health care providers: No discussion with other health care providers necessary.  I reviewed and reconciled the medications from hospital discharge.    He has had radiation and chemo. He had an esophagectomy.  He is a little rough, but feels like he is doing ok.  He passed his swallow test Monday.  He has tube feeds and is on a clear liquid diet.  He has been healing pretty well.  He has a feeding tube.    Discharge summary:  Hospital Course:  Lukasz Person is a 68 y.o. with PMHx of HTN, T2DM, KUMAR on CPAP, CAD, CKD3, A fib, HFrEF s/p esophagectomy on 3/14/2023.  Mr. Person remained on the esophagectomy pathway except for voice hoarseness and productive cough.  ENT consulted and patient with left true vocal fold paralysis.  To OR for direct laryngoscopy with injection of 0.5 cc of Restylane into left true vocal fold on 3/17/23 by Dr. Altman.  Patient to f/u with Dr. Valdivia in 2-3 months. The patient is tolerating tube feeds via j-tube from 2pm-8am.   Pateint to increase TF by 10cc each day at 2pm to a goal of 80cc/hour .  He is voiding and ambulating without difficulty.  Patient with no complaints of nausea, vomiting, chest pain or shortness of breath.  His vital signs stable. He is afebrile. He is  positive for flatus and positive for bowel sounds.  Patient is NPO except for necessary medications with a sip of water in the am (flomax, allopurinol, lisinopril/hctz, Troprol XL, Prilosec, Xarelto, Crestor).       Review of Systems      Objective:      Physical Exam  Vitals reviewed.   Constitutional:       Appearance: He is well-developed.      Comments: hoarse   HENT:      Head: Normocephalic and atraumatic.      Mouth/Throat:      Pharynx: No oropharyngeal exudate.   Eyes:      General: No scleral icterus.        Right eye: No discharge.         Left eye: No discharge.      Pupils: Pupils are equal, round, and reactive to light.   Neck:      Thyroid: No thyromegaly.      Trachea: No tracheal deviation.   Cardiovascular:      Rate and Rhythm: Normal rate and regular rhythm.      Heart sounds: Normal heart sounds. No murmur heard.    No friction rub. No gallop.   Pulmonary:      Effort: Pulmonary effort is normal. No respiratory distress.      Breath sounds: Normal breath sounds. No wheezing or rales.   Chest:      Chest wall: No tenderness.   Abdominal:      General: Bowel sounds are normal. There is no distension.      Palpations: Abdomen is soft. There is no mass.      Tenderness: There is no abdominal tenderness. There is no guarding or rebound.   Musculoskeletal:         General: No tenderness. Normal range of motion.      Cervical back: Normal range of motion and neck supple.   Skin:     General: Skin is warm and dry.      Coloration: Skin is not pale.      Findings: No erythema or rash.   Neurological:      Mental Status: He is alert and oriented to person, place, and time.   Psychiatric:         Behavior: Behavior normal.       Assessment:       1. Hospital discharge follow-up    2. Esophageal adenocarcinoma    3. H/O esophagectomy    4. KUMAR on CPAP    5. Immunodeficiency secondary to neoplasm    6. Hyperparathyroidism, primary    7. Aortic atherosclerosis      Plan:       1/2/3.  Continue to follow with  ENT.  4.  Resume CPAP when cleared by surgeon.    5/6.  Monitor.  7. Crestor 20 mg daily.    Return to clinic in 3 months or sooner if needed.

## 2023-03-31 ENCOUNTER — LAB VISIT (OUTPATIENT)
Dept: LAB | Facility: HOSPITAL | Age: 68
End: 2023-03-31
Payer: MEDICARE

## 2023-03-31 ENCOUNTER — PATIENT MESSAGE (OUTPATIENT)
Dept: ADMINISTRATIVE | Facility: OTHER | Age: 68
End: 2023-03-31
Payer: MEDICARE

## 2023-03-31 DIAGNOSIS — C15.9 ESOPHAGEAL ADENOCARCINOMA: ICD-10-CM

## 2023-03-31 DIAGNOSIS — Z00.6 CLINICAL TRIAL PARTICIPANT: ICD-10-CM

## 2023-03-31 LAB
ALBUMIN SERPL BCP-MCNC: 3.7 G/DL (ref 3.5–5.2)
ALP SERPL-CCNC: 144 U/L (ref 55–135)
ALT SERPL W/O P-5'-P-CCNC: 33 U/L (ref 10–44)
ANION GAP SERPL CALC-SCNC: 11 MMOL/L (ref 8–16)
AST SERPL-CCNC: 34 U/L (ref 10–40)
BASOPHILS # BLD AUTO: 0.05 K/UL (ref 0–0.2)
BASOPHILS NFR BLD: 0.9 % (ref 0–1.9)
BILIRUB SERPL-MCNC: 0.5 MG/DL (ref 0.1–1)
BUN SERPL-MCNC: 18 MG/DL (ref 8–23)
CALCIUM SERPL-MCNC: 9.4 MG/DL (ref 8.7–10.5)
CHLORIDE SERPL-SCNC: 94 MMOL/L (ref 95–110)
CO2 SERPL-SCNC: 28 MMOL/L (ref 23–29)
CREAT SERPL-MCNC: 1.1 MG/DL (ref 0.5–1.4)
DIFFERENTIAL METHOD: ABNORMAL
EOSINOPHIL # BLD AUTO: 0.2 K/UL (ref 0–0.5)
EOSINOPHIL NFR BLD: 3.3 % (ref 0–8)
ERYTHROCYTE [DISTWIDTH] IN BLOOD BY AUTOMATED COUNT: 14.9 % (ref 11.5–14.5)
EST. GFR  (NO RACE VARIABLE): >60 ML/MIN/1.73 M^2
GLUCOSE SERPL-MCNC: 189 MG/DL (ref 70–110)
HCT VFR BLD AUTO: 34.8 % (ref 40–54)
HGB BLD-MCNC: 11.1 G/DL (ref 14–18)
IMM GRANULOCYTES # BLD AUTO: 0.05 K/UL (ref 0–0.04)
IMM GRANULOCYTES NFR BLD AUTO: 0.9 % (ref 0–0.5)
LYMPHOCYTES # BLD AUTO: 0.5 K/UL (ref 1–4.8)
LYMPHOCYTES NFR BLD: 9.4 % (ref 18–48)
MCH RBC QN AUTO: 29.8 PG (ref 27–31)
MCHC RBC AUTO-ENTMCNC: 31.9 G/DL (ref 32–36)
MCV RBC AUTO: 94 FL (ref 82–98)
MONOCYTES # BLD AUTO: 0.7 K/UL (ref 0.3–1)
MONOCYTES NFR BLD: 13 % (ref 4–15)
NEUTROPHILS # BLD AUTO: 4 K/UL (ref 1.8–7.7)
NEUTROPHILS NFR BLD: 72.5 % (ref 38–73)
NRBC BLD-RTO: 0 /100 WBC
PLATELET # BLD AUTO: 280 K/UL (ref 150–450)
PMV BLD AUTO: 9.2 FL (ref 9.2–12.9)
POTASSIUM SERPL-SCNC: 4.7 MMOL/L (ref 3.5–5.1)
PROT SERPL-MCNC: 7.3 G/DL (ref 6–8.4)
RBC # BLD AUTO: 3.72 M/UL (ref 4.6–6.2)
SODIUM SERPL-SCNC: 133 MMOL/L (ref 136–145)
WBC # BLD AUTO: 5.45 K/UL (ref 3.9–12.7)

## 2023-03-31 PROCEDURE — 85025 COMPLETE CBC W/AUTO DIFF WBC: CPT | Mod: Q1 | Performed by: INTERNAL MEDICINE

## 2023-03-31 PROCEDURE — 36415 COLL VENOUS BLD VENIPUNCTURE: CPT | Mod: Q1 | Performed by: REGISTERED NURSE

## 2023-03-31 PROCEDURE — 80053 COMPREHEN METABOLIC PANEL: CPT | Performed by: REGISTERED NURSE

## 2023-04-01 ENCOUNTER — PATIENT MESSAGE (OUTPATIENT)
Dept: ADMINISTRATIVE | Facility: OTHER | Age: 68
End: 2023-04-01
Payer: MEDICARE

## 2023-04-02 ENCOUNTER — PATIENT MESSAGE (OUTPATIENT)
Dept: ADMINISTRATIVE | Facility: OTHER | Age: 68
End: 2023-04-02
Payer: MEDICARE

## 2023-04-03 ENCOUNTER — OFFICE VISIT (OUTPATIENT)
Dept: HEMATOLOGY/ONCOLOGY | Facility: CLINIC | Age: 68
End: 2023-04-03
Payer: MEDICARE

## 2023-04-03 ENCOUNTER — CLINICAL SUPPORT (OUTPATIENT)
Dept: HEMATOLOGY/ONCOLOGY | Facility: CLINIC | Age: 68
End: 2023-04-03
Payer: MEDICARE

## 2023-04-03 ENCOUNTER — RESEARCH ENCOUNTER (OUTPATIENT)
Dept: RESEARCH | Facility: HOSPITAL | Age: 68
End: 2023-04-03
Payer: MEDICARE

## 2023-04-03 ENCOUNTER — PATIENT MESSAGE (OUTPATIENT)
Dept: ADMINISTRATIVE | Facility: OTHER | Age: 68
End: 2023-04-03
Payer: MEDICARE

## 2023-04-03 ENCOUNTER — OFFICE VISIT (OUTPATIENT)
Dept: SURGERY | Facility: CLINIC | Age: 68
End: 2023-04-03
Payer: MEDICARE

## 2023-04-03 VITALS
SYSTOLIC BLOOD PRESSURE: 119 MMHG | HEIGHT: 72 IN | WEIGHT: 228.81 LBS | HEART RATE: 73 BPM | DIASTOLIC BLOOD PRESSURE: 56 MMHG | BODY MASS INDEX: 30.99 KG/M2

## 2023-04-03 VITALS
DIASTOLIC BLOOD PRESSURE: 56 MMHG | TEMPERATURE: 99 F | HEART RATE: 73 BPM | WEIGHT: 228.81 LBS | BODY MASS INDEX: 30.99 KG/M2 | SYSTOLIC BLOOD PRESSURE: 119 MMHG | RESPIRATION RATE: 18 BRPM | OXYGEN SATURATION: 97 % | HEIGHT: 72 IN

## 2023-04-03 DIAGNOSIS — E11.22 TYPE 2 DIABETES MELLITUS WITH STAGE 3 CHRONIC KIDNEY DISEASE, WITHOUT LONG-TERM CURRENT USE OF INSULIN, UNSPECIFIED WHETHER STAGE 3A OR 3B CKD: Chronic | ICD-10-CM

## 2023-04-03 DIAGNOSIS — C15.9 ESOPHAGEAL ADENOCARCINOMA: Primary | Chronic | ICD-10-CM

## 2023-04-03 DIAGNOSIS — E11.22 TYPE 2 DIABETES MELLITUS WITH STAGE 3 CHRONIC KIDNEY DISEASE, WITHOUT LONG-TERM CURRENT USE OF INSULIN, UNSPECIFIED WHETHER STAGE 3A OR 3B CKD: ICD-10-CM

## 2023-04-03 DIAGNOSIS — E11.22 CKD STAGE 3 DUE TO TYPE 2 DIABETES MELLITUS: ICD-10-CM

## 2023-04-03 DIAGNOSIS — I15.2 HYPERTENSION ASSOCIATED WITH DIABETES: ICD-10-CM

## 2023-04-03 DIAGNOSIS — C15.9 ESOPHAGEAL ADENOCARCINOMA: Primary | ICD-10-CM

## 2023-04-03 DIAGNOSIS — I25.10 CORONARY ARTERY DISEASE INVOLVING NATIVE CORONARY ARTERY OF NATIVE HEART WITHOUT ANGINA PECTORIS: ICD-10-CM

## 2023-04-03 DIAGNOSIS — N18.30 TYPE 2 DIABETES MELLITUS WITH STAGE 3 CHRONIC KIDNEY DISEASE, WITHOUT LONG-TERM CURRENT USE OF INSULIN, UNSPECIFIED WHETHER STAGE 3A OR 3B CKD: Chronic | ICD-10-CM

## 2023-04-03 DIAGNOSIS — G47.33 OSA ON CPAP: Chronic | ICD-10-CM

## 2023-04-03 DIAGNOSIS — I48.0 PAROXYSMAL ATRIAL FIBRILLATION: ICD-10-CM

## 2023-04-03 DIAGNOSIS — I50.20 HFREF (HEART FAILURE WITH REDUCED EJECTION FRACTION): ICD-10-CM

## 2023-04-03 DIAGNOSIS — J38.00 VOCAL CORD PARALYSIS: ICD-10-CM

## 2023-04-03 DIAGNOSIS — Z00.6 CLINICAL TRIAL PARTICIPANT: ICD-10-CM

## 2023-04-03 DIAGNOSIS — Z71.3 NUTRITIONAL COUNSELING: ICD-10-CM

## 2023-04-03 DIAGNOSIS — E11.59 HYPERTENSION ASSOCIATED WITH DIABETES: ICD-10-CM

## 2023-04-03 DIAGNOSIS — R13.10 DYSPHAGIA, UNSPECIFIED TYPE: ICD-10-CM

## 2023-04-03 DIAGNOSIS — E11.69 HYPERLIPIDEMIA ASSOCIATED WITH TYPE 2 DIABETES MELLITUS: ICD-10-CM

## 2023-04-03 DIAGNOSIS — Z93.4 JEJUNOSTOMY TUBE PRESENT: ICD-10-CM

## 2023-04-03 DIAGNOSIS — N18.30 CKD STAGE 3 DUE TO TYPE 2 DIABETES MELLITUS: ICD-10-CM

## 2023-04-03 DIAGNOSIS — Z78.9 ON ENTERAL NUTRITION AT HOME: ICD-10-CM

## 2023-04-03 DIAGNOSIS — N18.30 TYPE 2 DIABETES MELLITUS WITH STAGE 3 CHRONIC KIDNEY DISEASE, WITHOUT LONG-TERM CURRENT USE OF INSULIN, UNSPECIFIED WHETHER STAGE 3A OR 3B CKD: ICD-10-CM

## 2023-04-03 DIAGNOSIS — E78.5 HYPERLIPIDEMIA ASSOCIATED WITH TYPE 2 DIABETES MELLITUS: ICD-10-CM

## 2023-04-03 DIAGNOSIS — I48.0 PAROXYSMAL ATRIAL FIBRILLATION: Chronic | ICD-10-CM

## 2023-04-03 PROBLEM — E66.01 SEVERE OBESITY (BMI 35.0-35.9 WITH COMORBIDITY): Status: RESOLVED | Noted: 2020-09-15 | Resolved: 2023-04-03

## 2023-04-03 PROCEDURE — 99214 OFFICE O/P EST MOD 30 MIN: CPT | Mod: Q1,PBBFAC | Performed by: INTERNAL MEDICINE

## 2023-04-03 PROCEDURE — 99999 PR PBB SHADOW E&M-EST. PATIENT-LVL IV: ICD-10-PCS | Mod: PBBFAC,,, | Performed by: INTERNAL MEDICINE

## 2023-04-03 PROCEDURE — 99024 POSTOP FOLLOW-UP VISIT: CPT | Mod: POP,,, | Performed by: NURSE PRACTITIONER

## 2023-04-03 PROCEDURE — 97803 MED NUTRITION INDIV SUBSEQ: CPT | Mod: PBBFAC | Performed by: DIETITIAN, REGISTERED

## 2023-04-03 PROCEDURE — 99999 PR PBB SHADOW E&M-EST. PATIENT-LVL III: CPT | Mod: PBBFAC,,, | Performed by: NURSE PRACTITIONER

## 2023-04-03 PROCEDURE — 99999 PR PBB SHADOW E&M-EST. PATIENT-LVL IV: CPT | Mod: PBBFAC,,, | Performed by: INTERNAL MEDICINE

## 2023-04-03 PROCEDURE — 99215 OFFICE O/P EST HI 40 MIN: CPT | Mod: Q1,S$PBB,, | Performed by: INTERNAL MEDICINE

## 2023-04-03 PROCEDURE — 99999 PR PBB SHADOW E&M-EST. PATIENT-LVL III: ICD-10-PCS | Mod: PBBFAC,,, | Performed by: NURSE PRACTITIONER

## 2023-04-03 PROCEDURE — 99215 PR OFFICE/OUTPT VISIT, EST, LEVL V, 40-54 MIN: ICD-10-PCS | Mod: Q1,S$PBB,, | Performed by: INTERNAL MEDICINE

## 2023-04-03 PROCEDURE — 99213 OFFICE O/P EST LOW 20 MIN: CPT | Mod: PBBFAC,27 | Performed by: NURSE PRACTITIONER

## 2023-04-03 PROCEDURE — 99024 PR POST-OP FOLLOW-UP VISIT: ICD-10-PCS | Mod: POP,,, | Performed by: NURSE PRACTITIONER

## 2023-04-03 NOTE — PROGRESS NOTES
: URIEL Manriquez MD  Treating Investigators: NIRAV Medrano MD  Surgical Oncologist: SARAH Brown M.D. / Gerri Zhang NP  Radiation Oncologist: Javier Moulton M.D, PhD    Protocol: ECOG-ACRIN [id number]  IRB#: [id number]  Patient ID: [id number]  Treatment: Arm B - Carbo+Taxol+Nivolumab    A Phase II/III Study of Dilcia-operative Nivolumab and Ipilimumab in Patients with Locoregional Esophageal and Gastroesophageal Junction Adenocarcinoma       SOC 3 weeks post-op visit: 03 Apr 2023  Patient presents to clinic accompanied by his wife Faviola for his SOC 3 week post-op visit with Dr. Medrano. Patient queried & verbalized his consent to continue on above-mentioned study. Patient queried & continues to report hoarseness & a cough that are persistent since hospital stay. Patient is no longer taking glimepiride, potassium citrate, or Magic Mouthwash & started taking hydrocodone/acetaminophen 7.5/325 mg PRN as of 27Mar2023.     Patient's VS, PE & ECOG (ECOG = 0, per Dr. Medrano) were collected today. Dr. Medrano assessed all patient's labs, VS & PE findings as either WNL or NCS. Dr. Medrano reviewed the patient's pathology report with patient & explained Step 2 of the clinical trial vs. SOC options for the patient. Patient has opted to continue on trial. Dr. Medrano would like to start treatment in 3 weeks -- Step 2 randomization criteria & pre-treatment assessments will be completed 1 week prior to expected start, with the schedule outlined below.   Patient also met with ASHU Pacheco, ANP-C today whom is agreeable with plan.    Patient was encouraged to contact [facility] or Nvea Medrano or Stephanie's team with any questions or concerns & was reminded of clinic's 24/7 emergency contact number. Patient verbalized understanding & denies any additional questions at this time.         Step 2 Randomization / Pre-Treatment Assessments:  [id number] @ 0945 - labs @ [facility] 3rd floor  [id number] @ 1000 - CT C/A/P @ [address]  Floor  [id number] @ 1300 - [facility] 2nd Floor  [id number] @ 1400 - infusion (treatment plan not confirmed - Patient to be randomized after labs & scans)                 Complete Baseline Medical History, Adverse Events, and Concomitant Medications are located in patient's shadow chart

## 2023-04-03 NOTE — PROGRESS NOTES
Post-Op Follow-up Visit:   4/3/2023  Patient ID: Lukasz Person is a 68 y.o. male, born 1955    Chief Complaint   Patient presents with    Post-op Evaluation     8/2022: PCP for progressive dysphagia  11/17/22: EGD:nodular mucosa in the esophagus at the GE junction. Esophageal mucosal changes suggestive of short-segment Paredes's esophagus. Biopsies positive for adenocarcinoma.   12/7/22: EUS: Partially obstructing, malignant esophageal tumor was found in the lower third of the esophagus staged T3 N0 Mx by endosonographic criteria.   12/8/22: PET CT: Distal esophageal wall thickening and hypermetabolic activity in keeping with known esophageal cancer with no definite evidence of metastatic disease  3/14/22: robotic esophagectomy with J tube per Dr. Brown  3/27/23: Esophragram with no evidence of postoperative leak.    Interval History: This 69 y/o gentleman returns to clinic from [facility] with his wife. He was seen last week in clinic by Dr. Brown. He was seen by Dr. Medrano earlier today who ordered CT C/A/P for next week.   His only complaint is discomfort at J tube exit site. He is tolerating 4 cartons of J feeds at 70cc/hr. Denies missing any feedings. He is taking oral diet including broth, water, jello, popsicle. He denies N/V/D. Weight down 2# since last visit. He is able to swallow pills without difficulty. Denies dysphagia and odynophagia. Denies CP, SOB.   He is not currently using CPAP at night as requested.   Has Xarelto     3/27/2023: esophagram:  Postoperative change of distal esophagectomy.  No evidence of postoperative leak.  Mild esophageal dysmotility.      Chemistry        Component Value Date/Time     (L) 03/31/2023 1020    K 4.7 03/31/2023 1020    CL 94 (L) 03/31/2023 1020    CO2 28 03/31/2023 1020    BUN 18 03/31/2023 1020    CREATININE 1.1 03/31/2023 1020     (H) 03/31/2023 1020        Component Value Date/Time    CALCIUM 9.4 03/31/2023 1020    ALKPHOS 144 (H) 03/31/2023 1020     AST 34 03/31/2023 1020    ALT 33 03/31/2023 1020    BILITOT 0.5 03/31/2023 1020    ESTGFRAFRICA 54.5 (A) 05/02/2022 0730    EGFRNONAA 47.2 (A) 05/02/2022 0730        Lab Results   Component Value Date    WBC 5.45 03/31/2023    HGB 11.1 (L) 03/31/2023    HCT 34.8 (L) 03/31/2023    MCV 94 03/31/2023     03/31/2023       Physical Exam:  BP (!) 119/56   Pulse 73   Ht 6' (1.829 m)   Wt 103.8 kg (228 lb 13.4 oz)   BMI 31.04 kg/m²   General:  Non-toxic, ambulatory  Abd:  Soft, non-tender  Incision:  J tube sutured in place without exudate, mild erythema to lower aspect. L anterior neck incision edges well approximated without erythema or exudate. Several abd and R flank trocar sites with dermabond intact without exudate or erythema.     Pathology:  Procedure - Esophageal gastrectomy Tumor site - GE Junction Relationship of tumor to esophagogastric junction - Lesion is central at GE junction Tumor size - 3.0 x 3.1cm  Histologic type - Adenocarcinoma Histologic grade - Moderately differentiated Microscopic tumor extension - Tumor invades adventitia Margins - All margins of resection are uninvolved, proximal, distal and adventitial margins are negative Treatment effect - Extensive residual cancer with no evident tumor regression (poor or no response, score 3) Lymphovascular invasion - Not identified Pathologic staging - yp T3 N0 Mx Lymph nodes examined - 12 Lymph nodes involved - 0 Additional pathologic findings - Intestinal metaplasia present      ICD-10-CM ICD-9-CM    1. Esophageal adenocarcinoma  C15.9 150.9       2. Paroxysmal atrial fibrillation  I48.0 427.31       3. Type 2 diabetes mellitus with stage 3 chronic kidney disease, without long-term current use of insulin, unspecified whether stage 3a or 3b CKD  E11.22 250.40     N18.30 585.3       4. KUMAR on CPAP  G47.33 327.23     Z99.89 V46.8       5. Jejunostomy tube present  Z93.4 V44.4       6. On enteral nutrition at home  Z78.9 V49.89       Plan      Reviewed pathology with him and his wife. Printed copy provided to him today.   He already re-established with med onc post op.   Advance to full liquid diet today, food examples discussed and written list provided in AVS  Continue J tube feedings.   Increase physical activity but no heavy lifting until 6 weeks post op  May resume driving  Shower daily  May resume use of CPAP      Follow up in about 2 weeks (around 4/17/2023).    Questions were asked and answered to patient and his wife's satisfaction.      Pt seen in conjunction with Dr. Brown today.        Syeda Galvan NP  Upper GI / Hepatobiliary Surgical Oncology  [facility] [place], [place]  Office: [phone]  Fax: [phone]

## 2023-04-03 NOTE — PROGRESS NOTES
Oncology Nutrition Assessment for Medical Nutrition Therapy  Follow-up Visit    Lukasz Person   1955    Referring Provider: No ref. provider found      Reason for Visit: nutrition counseling and education    PMHx:   Past Medical History:   Diagnosis Date    Anticoagulant long-term use     Diabetes mellitus     Onset late 50s/early 60s    Hyperlipidemia     Hypertension     Onset late 50s/early 60s    Kidney stones     Sleep apnea     since 2006       Nutrition Assessment    This is a 68 y.o.male with a medical diagnosis of esophageal adenocarcinoma s/p neoadjuvant chemoradiation and now s/p esophagectomy 3/14. He was discharged NPO and on TF of [employer] Standard 1.4 at 80mL/hr x 18hrs (provides 2016 kcal/day and 89g protein/day) + water flushes at 55mL/hr for every hour on feeds. He is known to me from previous appointment. Esophagram last week, cleared for CL diet per surg onc which he reports have gone well. Cleared for FL diet today. He continues on TF, reports changing duration and keeping at 70mL/hr has worked well. No longer needing to take a break. He is getting almost 4 cartons/day.    Weight: 103.8 kg (228 lb 13.4 oz)  Height: 6' (182.9 cm)  BMI: 31    Usual BW: 260-265lb  Weight Change: 30lb loss over 3 months (15lb of which were lost after surgery)    Allergies: Patient has no known allergies.    Current Medications:  Current Outpatient Medications:     allopurinoL (ZYLOPRIM) 100 MG tablet, TAKE 1 TABLET BY MOUTH EVERY DAY, Disp: 30 tablet, Rfl: 10    blood sugar diagnostic (ACCU-CHEK ANTONELLA PLUS TEST STRP) Strp, Use to test CBG four times daily E11.65, Disp: 400 strip, Rfl: 1    blood-glucose meter kit, Checks blood sugars 1x/daily., Disp: 1 each, Rfl: 12    citric acid-potassium citrate (POLYCITRA) 1,100-334 mg/5 mL solution, Take 5 mLs (10 mEq total) by mouth 3 (three) times daily with meals. (Patient not taking: Reported on 3/22/2023), Disp: 450 mL, Rfl: 6    diphenhydrAMINE-aluminum-magnesium  hydroxide-simethicone-LIDOcaine HCl 2%, Swish and swallow 10 mLs every 6 (six) hours as needed (pain swallowing). (Patient not taking: Reported on 3/7/2023), Disp: 360 each, Rfl: 0    glimepiride (AMARYL) 2 MG tablet, TAKE 2 TABLETS BY MOUTH EVERY MORNING (Patient not taking: Reported on 3/22/2023), Disp: 180 tablet, Rfl: 2    hydrocodone-acetaminophen (HYCET) solution 7.5-325 mg/15mL, 15 mLs by Per J Tube route every 8 (eight) hours as needed for Pain., Disp: 118 mL, Rfl: 0    lancets (ACCU-CHEK SOFTCLIX LANCETS) Misc, Use to test CBG 4 times daily E11.65, Disp: 400 each, Rfl: 1    lisinopriL-hydrochlorothiazide (PRINZIDE,ZESTORETIC) 20-25 mg Tab, TAKE 1 TABLET BY MOUTH EVERY DAY, Disp: 90 tablet, Rfl: 3    metFORMIN 500 mg/5 mL Soln, Take 1,000 mg by mouth 2 (two) times a day., Disp: 1800 mL, Rfl: 3    metoprolol succinate (TOPROL-XL) 25 MG 24 hr tablet, Take 1 tablet (25 mg total) by mouth once daily., Disp: 30 tablet, Rfl: 11    nitroGLYCERIN (NITROSTAT) 0.4 MG SL tablet, Place 1 tablet (0.4 mg total) under the tongue every 5 (five) minutes as needed for Chest pain. If you need a third tablet, call 911, Disp: 60 tablet, Rfl: 12    omeprazole (PRILOSEC) 40 MG capsule, Take 1 capsule (40 mg total) by mouth once daily., Disp: 90 capsule, Rfl: 3    rivaroxaban (XARELTO) 20 mg Tab, Take 1 tablet (20 mg total) by mouth daily with dinner or evening meal., Disp: 30 tablet, Rfl: 11    rosuvastatin (CRESTOR) 20 MG tablet, TAKE 1 TABLET(20 MG) BY MOUTH EVERY DAY (Patient taking differently: Take 20 mg by mouth every evening.), Disp: 30 tablet, Rfl: 11    tamsulosin (FLOMAX) 0.4 mg Cap, Take 1 capsule (0.4 mg total) by mouth once daily. (Patient taking differently: Take 0.4 mg by mouth nightly.), Disp: 30 capsule, Rfl: 11    testosterone (ANDROGEL) 20.25 mg/1.25 gram (1.62 %) GlPm, Apply 4 pumps to shoulders daily, Disp: 2 each, Rfl: 5    Food/medication interactions noted: none    Vitamins/Supplements: none    Labs:  Reviewed    Nutrition Diagnosis    Problem: altered GI function  Etiology (related to):  NPO s/p esophagectomy  Signs/Symptoms (as evidenced by): requiring tube feeding for nutrition support    Nutrition Intervention    Nutrition Prescription   2388 kcals (23kcal/kg)  93g protein (0.9g/kg)   2388mL fluid (23mL/kg)    Recommendations:  Continue TF - 4 cartons/day (we discussed waiting to decrease this until tolerating soft diet well)  Continue water flushes at 55mL/hr for every hour on feeds  Flush J-tube with at least 60mL water before and after feeds  FL diet per surg onc    Materials Provided/Reviewed: Post-op Diet Progression handout    Nutrition Monitoring and Evaluation    Monitor: energy intake, rate/formulation of tube feeding, and weight status    Goals: weight maintenance    Follow up: in 1-2 weeks    Communication to referring provider/care team: note available in chart    Counseling time: 15 minutes    Carmen Clark MS, RD, LDN  [phone]

## 2023-04-03 NOTE — PROGRESS NOTES
MEDICAL ONCOLOGY - ESTABLISHED PATIENT VISIT    Reason for visit: esophageal cancer    Best Contact Phone Number(s): There are no phone numbers on file.     Cancer/Stage/TNM:    Cancer Staging   Esophageal adenocarcinoma  Staging form: Esophagus - Adenocarcinoma, AJCC 8th Edition  - Pathologic stage from 3/28/2023: Stage II (ypT3, pN0, cM0, G2) - Signed by Santana Brown Jr., MD on 3/28/2023       Oncology History   Esophageal adenocarcinoma   12/8/2022 Initial Diagnosis    Esophageal adenocarcinoma       12/21/2022 - 12/21/2022 Chemotherapy    Treatment Summary   Plan Name: OP ESOPHAGEAL PACLITAXEL CARBOPLATIN WEEKLY  Treatment Goal: Curative  Status: Inactive  Start Date:   End Date:   Provider: Miki Medrano MD  Chemotherapy: CARBOplatin (PARAPLATIN) in sodium chloride 0.9% 250 mL chemo infusion, , Intravenous, Clinic/HOD 1 time, 0 of 1 cycle  PACLitaxeL (TAXOL) 50 mg/m2 = 120 mg in sodium chloride 0.9% 250 mL chemo infusion, 50 mg/m2, Intravenous, Clinic/HOD 1 time, 0 of 1 cycle       12/29/2022 -  Chemotherapy    Treatment Summary   Plan Name: [facility] [id number] ARM B CARBOPLATIN PACLITAXEL NIVOLUMAB  Treatment Goal: Curative  Status: Active  Start Date: 12/29/2022  End Date: 1/27/2023 (Planned)  Provider: iMki Medrano MD  Chemotherapy: CARBOplatin (PARAPLATIN) 215 mg in sodium chloride 0.9% 306.5 mL chemo infusion, 215 mg, Intravenous, Clinic/HOD 1 time, 4 of 5 cycles  Administration: 215 mg (12/29/2022), 230 mg (1/5/2023), 265 mg (1/13/2023), 265 mg (1/20/2023)  PACLitaxeL (TAXOL) 50 mg/m2 = 120 mg in sodium chloride 0.9% 250 mL chemo infusion, 50 mg/m2 = 120 mg, Intravenous, Clinic/HOD 1 time, 4 of 5 cycles  Dose modification: 50 mg/m2 (original dose 50 mg/m2, Cycle 4)  Administration: 120 mg (12/29/2022), 120 mg (1/5/2023), 120 mg (1/13/2023), 120 mg (1/20/2023)       12/29/2022 - 2/1/2023 Radiation Therapy    Treating physician: Dr. Javier Moulton    Course: C1 CHEST 2022    Treatment Site  Ref. ID Energy Dose/Fx (Gy) #Fx Dose Correction (Gy) Total Dose (Gy) Start Date End Date Elapsed Days   IM Esophagus [id number] 6X 1.8 23 / 23 0 41.4 12/29/2022 2/1/2023 34        3/28/2023 Cancer Staged    Staging form: Esophagus - Adenocarcinoma, AJCC 8th Edition  - Pathologic stage from 3/28/2023: Stage II (ypT3, pN0, cM0, G2)            Interim History:     Mr. Person returns to clinic today for follow-up.  He underwent robotic esophagectomy on 3/14/23 with Dr. Brown and J tube insertion. Had L vocal cord palsy post-operatively that was treated with injection laryngoplasty on 3/18/23.    He is at 70 cc/hr on his tube feeds as he felt pain with 80 cc and some leakage around the J tube site.  He is taking clears by mouth since last week.  Denies nausea or vomiting.  Reports that his energy is slowly improving.  He had some issues post-operatively with feeling that he couldn't clear his secretions but this has improved.    No other new complaints.      ROS:   Review of Systems   Constitutional:  Negative for appetite change, chills, fatigue, fever and unexpected weight change.   HENT:  Positive for voice change. Negative for mouth sores and trouble swallowing.    Eyes:  Negative for visual disturbance.   Respiratory:  Negative for cough, shortness of breath and wheezing.    Cardiovascular:  Negative for chest pain, palpitations and leg swelling.   Gastrointestinal:  Negative for abdominal pain, blood in stool, constipation, diarrhea, nausea, reflux and fecal incontinence.   Genitourinary:  Negative for bladder incontinence, dysuria, frequency, hematuria and urgency.   Musculoskeletal:  Positive for back pain. Negative for arthralgias, gait problem, leg pain, myalgias and neck pain.   Integumentary:  Negative for rash and wound.   Neurological:  Negative for dizziness, facial asymmetry, weakness, light-headedness, headaches, coordination difficulties, memory loss and coordination difficulties.   Hematological:   Negative for adenopathy. Does not bruise/bleed easily.   Psychiatric/Behavioral:  Negative for agitation, behavioral problems, confusion and sleep disturbance. The patient is not nervous/anxious.    All other systems reviewed and are negative.      Past Medical History:   Past Medical History:   Diagnosis Date    Anticoagulant long-term use     Diabetes mellitus     Onset late 50s/early 60s    Hyperlipidemia     Hypertension     Onset late 50s/early 60s    Kidney stones     Sleep apnea     since 2006        Past Surgical History:   Past Surgical History:   Procedure Laterality Date    CORONARY ANGIOGRAPHY N/A 9/30/2020    Procedure: ANGIOGRAM, CORONARY ARTERY;  Surgeon: John West MD;  Location: [facility] CATH LAB;  Service: Cardiology;  Laterality: N/A;    CYSTOSCOPY N/A 2/17/2022    Procedure: CYSTOSCOPY;  Surgeon: Lukasz Hughes MD;  Location: [facility] OR [address] [place] [address];  Service: Urology;  Laterality: N/A;    CYSTOSCOPY W/ URETERAL STENT PLACEMENT N/A 2/25/2022    Procedure: CYSTOSCOPY, WITH URETERAL STENT INSERTION;  Surgeon: Lukasz Hughes MD;  Location: [address];  Service: Urology;  Laterality: N/A;    ENDOSCOPIC ULTRASOUND OF UPPER GASTROINTESTINAL TRACT N/A 12/7/2022    Procedure: ULTRASOUND, UPPER GI TRACT, ENDOSCOPIC;  Surgeon: Darian Main MD;  Location: [facility] ENDO;  Service: Endoscopy;  Laterality: N/A;  Approval to hold Xarelto rec'd from Dr. Nick (see t/e 12/5/22)-DS    ESOPHAGOGASTRODUODENOSCOPY N/A 11/17/2022    Procedure: EGD (ESOPHAGOGASTRODUODENOSCOPY);  Surgeon: Brody Gonzales MD;  Location: [facility] (2ND FLR);  Service: Endoscopy;  Laterality: N/A;  inst via email-ok to hold Xarelto x 2 days-MS    LASER LITHOTRIPSY Left 2/25/2022    Procedure: LITHOTRIPSY, USING LASER;  Surgeon: Lukasz Hughes MD;  Location: [address];  Service: Urology;  Laterality: Left;    LEFT HEART CATHETERIZATION Left 9/30/2020    Procedure: Left heart cath;  Surgeon: John West MD;   Location: [facility] CATH LAB;  Service: Cardiology;  Laterality: Left;    LITHOTRIPSY      PARATHYROIDECTOMY  1/1/2-107    PYELOSCOPY Left 2/25/2022    Procedure: PYELOSCOPY;  Surgeon: Lukasz Hughes MD;  Location: [address];  Service: Urology;  Laterality: Left;    ROBOT-ASSISTED SURGICAL REMOVAL OF ESOPHAGUS USING DA CARLOS XI N/A 3/14/2023    Procedure: XI ROBOTIC ESOPHAGECTOMY;  Surgeon: Santana Brown Jr., MD;  Location: [address];  Service: General;  Laterality: N/A;  Abdomen, Chest and Neck    ROBOTIC JEJUNOSTOMY N/A 3/14/2023    Procedure: ROBOTIC JEJUNOSTOMY TUBE INSERTION;  Surgeon: Santana Brown Jr., MD;  Location: [address];  Service: General;  Laterality: N/A;    TRANSESOPHAGEAL ECHOCARDIOGRAPHY N/A 4/7/2022    Procedure: ECHOCARDIOGRAM, TRANSESOPHAGEAL;  Surgeon: [facility] Diagnostic Provider;  Location: [facility] EP LAB;  Service: Cardiology;  Laterality: N/A;    TREATMENT OF CARDIAC ARRHYTHMIA N/A 4/7/2022    Procedure: Cardioversion or Defibrillation;  Surgeon: Iris Fisher NP;  Location: [facility] EP LAB;  Service: Cardiology;  Laterality: N/A;  afib, dccv, artie, anes, EH, 3prep    URETEROSCOPIC REMOVAL OF URETERIC CALCULUS Left 2/25/2022    Procedure: REMOVAL, CALCULUS, URETER, URETEROSCOPIC;  Surgeon: Lukasz Hughes MD;  Location: [address];  Service: Urology;  Laterality: Left;    URETEROSCOPY Left 2/25/2022    Procedure: URETEROSCOPY;  Surgeon: Lukasz Hughes MD;  Location: [address];  Service: Urology;  Laterality: Left;    VOCAL CORD INJECTION Left 3/17/2023    Procedure: INJECTION, VOCAL CORD, LARYNGOSCOPIC;  Surgeon: Gm Altman MD;  Location: [address];  Service: ENT;  Laterality: Left;        Family History:   Family History   Problem Relation Age of Onset    Arthritis Mother     Heart disease Father 70        CABG    Arthritis Father     Diabetes Father     Kidney disease Father         had one kidney removed in early thirties    Arthritis  Sister     Arthritis Sister     Hypertension Sister     Colon cancer Brother 32    Arthritis Brother     Alcohol abuse Paternal Aunt     Cancer Maternal Grandfather         throat cancer    Diabetes Paternal Grandmother     Colon polyps Paternal Grandfather     Colon cancer Paternal Grandfather     Cancer Paternal Grandfather         colon cancer at age 62        Social History:   Social History     Tobacco Use    Smoking status: Former     Packs/day: 1.00     Years: 7.00     Pack years: 7.00     Types: Cigarettes, Cigars     Start date: 1972     Quit date:      Years since quittin.9     Passive exposure: Never    Smokeless tobacco: Never    Tobacco comments:     smoking was off and on.  cumulative 7 years.  never more than three years in one stretch or more lj   Substance Use Topics    Alcohol use: Yes     Alcohol/week: 3.0 standard drinks     Types: 2 Cans of beer, 1 Shots of liquor per week     Comment: don't drink regularly.  6 to 7 monthly      I have reviewed and updated the patient's past medical, surgical, family and social histories.    Allergies:   Review of patient's allergies indicates:  No Known Allergies     Medications:   Current Outpatient Medications   Medication Sig Dispense Refill    allopurinoL (ZYLOPRIM) 100 MG tablet TAKE 1 TABLET BY MOUTH EVERY DAY 30 tablet 10    blood sugar diagnostic (ACCU-CHEK ANTONELLA PLUS TEST STRP) Strp Use to test CBG four times daily E11.65 400 strip 1    blood-glucose meter kit Checks blood sugars 1x/daily. 1 each 12    citric acid-potassium citrate (POLYCITRA) 1,100-334 mg/5 mL solution Take 5 mLs (10 mEq total) by mouth 3 (three) times daily with meals. (Patient not taking: Reported on 3/22/2023) 450 mL 6    diphenhydrAMINE-aluminum-magnesium hydroxide-simethicone-LIDOcaine HCl 2% Swish and swallow 10 mLs every 6 (six) hours as needed (pain swallowing). (Patient not taking: Reported on 3/7/2023) 360 each 0    glimepiride (AMARYL) 2 MG tablet TAKE 2  TABLETS BY MOUTH EVERY MORNING (Patient not taking: Reported on 3/22/2023) 180 tablet 2    hydrocodone-acetaminophen (HYCET) solution 7.5-325 mg/15mL 15 mLs by Per J Tube route every 8 (eight) hours as needed for Pain. 118 mL 0    lancets (ACCU-CHEK SOFTCLIX LANCETS) Misc Use to test CBG 4 times daily E11.65 400 each 1    lisinopriL-hydrochlorothiazide (PRINZIDE,ZESTORETIC) 20-25 mg Tab TAKE 1 TABLET BY MOUTH EVERY DAY 90 tablet 3    metFORMIN 500 mg/5 mL Soln Take 1,000 mg by mouth 2 (two) times a day. 1800 mL 3    metoprolol succinate (TOPROL-XL) 25 MG 24 hr tablet Take 1 tablet (25 mg total) by mouth once daily. 30 tablet 11    nitroGLYCERIN (NITROSTAT) 0.4 MG SL tablet Place 1 tablet (0.4 mg total) under the tongue every 5 (five) minutes as needed for Chest pain. If you need a third tablet, call 911 60 tablet 12    omeprazole (PRILOSEC) 40 MG capsule Take 1 capsule (40 mg total) by mouth once daily. 90 capsule 3    rivaroxaban (XARELTO) 20 mg Tab Take 1 tablet (20 mg total) by mouth daily with dinner or evening meal. 30 tablet 11    rosuvastatin (CRESTOR) 20 MG tablet TAKE 1 TABLET(20 MG) BY MOUTH EVERY DAY (Patient taking differently: Take 20 mg by mouth every evening.) 30 tablet 11    tamsulosin (FLOMAX) 0.4 mg Cap Take 1 capsule (0.4 mg total) by mouth once daily. (Patient taking differently: Take 0.4 mg by mouth nightly.) 30 capsule 11    testosterone (ANDROGEL) 20.25 mg/1.25 gram (1.62 %) GlPm Apply 4 pumps to shoulders daily 2 each 5     No current facility-administered medications for this visit.        Physical Exam:   BP (!) 119/56 (BP Location: Left arm, Patient Position: Sitting, BP Method: Medium (Automatic))   Pulse 73   Temp 99.1 °F (37.3 °C) (Oral)   Resp 18   Ht 6' (1.829 m)   Wt 103.8 kg (228 lb 13.4 oz)   SpO2 97%   BMI 31.04 kg/m²      ECOG Performance status: 0    Physical Exam  Vitals reviewed.   Constitutional:       General: He is not in acute distress.     Appearance: Normal  appearance. He is not ill-appearing, toxic-appearing or diaphoretic.   HENT:      Head: Normocephalic and atraumatic.   Eyes:      General: No scleral icterus.     Extraocular Movements: Extraocular movements intact.      Conjunctiva/sclera: Conjunctivae normal.      Pupils: Pupils are equal, round, and reactive to light.   Neck:      Comments: L neck wound well-healing  Cardiovascular:      Rate and Rhythm: Normal rate and regular rhythm.      Heart sounds: Normal heart sounds. No murmur heard.  Pulmonary:      Effort: Pulmonary effort is normal. No respiratory distress.      Breath sounds: Normal breath sounds. No wheezing or rales.   Abdominal:      General: Bowel sounds are normal. There is no distension.      Palpations: Abdomen is soft.      Tenderness: There is no abdominal tenderness.      Comments: J tube in place   Musculoskeletal:         General: No swelling.      Cervical back: Normal range of motion.   Lymphadenopathy:      Cervical: No cervical adenopathy.   Skin:     Coloration: Skin is not jaundiced.      Findings: No bruising, erythema or rash.   Neurological:      General: No focal deficit present.      Mental Status: He is alert and oriented to person, place, and time. Mental status is at baseline.      Cranial Nerves: No cranial nerve deficit.      Motor: No weakness.      Gait: Gait normal.   Psychiatric:         Mood and Affect: Mood normal.         Behavior: Behavior normal.         Thought Content: Thought content normal.         Judgment: Judgment normal.       Labs:   No results found for this or any previous visit (from the past 48 hour(s)).     I have reviewed the pertinent labs from 3/31/23 which are notable for mild anemia.  Cr 1.1, normal LFTs.    Imagin2022 - EUS  Impression:             - Esophageal mucosal changes consistent with Paredes's esophagus.   - Partially obstructing, malignant esophageal tumor was found in the lower third of the esophagus.   - Normal stomach.    - Normal examined duodenum.   - A mass was found in the lower third of the esophagus. A tissue diagnosis was obtained prior to this exam. This is of adenocarcinoma. This was staged T3 (based on invasion into) N0 Mx by endosonographic criteria.   - No specimens collected.     3/1/22 - PET CT   Impression:     1.  Decreased uptake in persistent distal esophageal thickening suggestive of partial response to therapy.  Previous non hypermetabolic gastrohepatic and perigastric lymph nodes are stable to slightly decreased in size.     2.  No new FDG avid lesions to suggest mixed response to therapy.     3.  Incidental new hypermetabolic gluteal cutaneous thickening, likely benign.  Recommend correlation with history and physical examination.    Path:   3/14/23:  Final Pathologic Diagnosis 1. LYMPH NODE, LEVEL 7, EXCISION:   One benign lymph node (0/1)   2. TOTAL THORACIC ESOPHAGECTOMY AND PROXIMAL STOMACH:   Adenocarcinoma, moderately differentiated, 3.0 cm   Margins are negative   Tumor invades adventitia   Extensive residual cancer with no evident tumor regression (poor or no   response, score 3)   Eleven benign lymph nodes (0/11)   3. RESIDUAL CONDUIT, EXCISION:   Negative for malignancy   SYNOPTIC REPORT   Procedure - Esophageal gastrectomy   Tumor site - GE Junction   Relationship of tumor to esophagogastric junction - Lesion is central at GE   junction   Tumor size - 3.0 x 3.1cm   Histologic type - Adenocarcinoma   Histologic grade - Moderately differentiated   Microscopic tumor extension - Tumor invades adventitia   Margins - All margins of resection are uninvolved, proximal, distal and   adventitial margins are negative   Treatment effect - Extensive residual cancer with no evident tumor regression   (poor or no response, score 3)   Lymphovascular invasion - Not identified   Pathologic staging - yp T3 N0 Mx   Lymph nodes examined - 12   Lymph nodes involved - 0   Additional pathologic findings - Intestinal  metaplasia present   MMR-IHC has been performed on previous biopsy ([id number]) and shows all 4   antibodies intact          Assessment:       1. Esophageal adenocarcinoma    2. Dysphagia, unspecified type    3. Vocal cord paralysis    4. Type 2 diabetes mellitus with stage 3 chronic kidney disease, without long-term current use of insulin, unspecified whether stage 3a or 3b CKD    5. CKD stage 3 due to type 2 diabetes mellitus    6. Hypertension associated with diabetes    7. Hyperlipidemia associated with type 2 diabetes mellitus    8. Paroxysmal atrial fibrillation    9. Coronary artery disease involving native coronary artery of native heart without angina pectoris    10. HFrEF (heart failure with reduced ejection fraction)                Plan:     # Esophageal adenocarcinoma   Mr. Person is a pleasant 67 year old male who presents to our clinic for management of recently diagnosed esophageal cancer. He initially presented with dysphagia, and further workup confirmed a stage II adenocarcinoma, pMMR. Tumor staged T3N0Mx by endosonographic criteria, JEVON. CT CAP on 12/14/22 confirmed no evidence of metastatic disease.    Previously conversation regarding his diagnosis and treatment options.  Recommended perioperative chemo/radiation per the CROSS trial. Discussed chemoradiation for ~5 weeks with 5 doses of weekly carboplatin and taxol administered. Plan to obtain restaging scans 4-5 weeks after radiation completed.     He was a candidate for a cooperative group trial assessing perioperative immunotherapy treatment. He consented for this study - ECOG-ACRIN [id number]: A Phase II/III Study of Dilcia-operative Nivolumab and Ipilimumab in Patients with Locoregional Esophageal and Gastroesophageal Junction Adenocarcinoma    Previously met with Dr. Santana Brown who agreed he was a surgical candidate.  Previously met with Dr. Javier Moulton who will be treating him with radiation.    Began cycle 1  carboplatin/paclitaxel/nivolumab on trial on 12/29/22.  Received cycle 4 of weekly chemotherapy on trial on 1/20/23.   We held chemotherapy for week 5 because of thrombocytopenia per protocol.    Completed radiation on 2/1/23.    Restaging PET/CT personally reviewed on 3/1/23 shows interval decreased FDG avidity in esophageal tumor, no new sites of disease.  CT CAP 3/2/23 shows stable esophageal thickening.    Underwent robotic esophagectomy on 3/14/23 with Dr. Brown.  Pathology showed negative margins, ypT3 N0 tumor with 0 of 12 lymph nodes involved.  No treatment effect was noted on the tumor tissue.    Discussed that per the US7304 protocol will proceed with randomization to adjuvant nivolumab +/- ipilimumab.  Will tentatively plan for him to return in about 3 weeks to start adjuvant IO.    Discussed the plan and timing of immunotherapy with Dr. Brown today in clinic.     # Vocal cord paralysis  Post-op. S/p injection by ENT.    # HTN, HLD, DM, CKD  Following with PCP Dr. Wilson and nephrologist Dr. Oconnell.   BP normal today.     Creatinine 1.1 on most recent labs.   Continue medical management.      # CAD, A fib, CHF  Following with cardiologist Dr. Nick.   Currently asymptomatic.   On Xarelto.   Continue medical management.     Follow up: 3 weeks.    Miki Medrano MD  Hematology/Oncology  [facility]        Route Chart for Scheduling    Med Onc Chart Routing      Follow up with physician . Per research - Maria Esther   Follow up with SAMUEL    Infusion scheduling note    Injection scheduling note    Labs    Imaging    Pharmacy appointment    Other referrals           Treatment Plan Information   [facility] [id number] ARM B CARBOPLATIN PACLITAXEL NIVOLUMAB   Miki Medrano MD   Upcoming Treatment Dates - [facility] [id number] ARM B CARBOPLATIN PACLITAXEL NIVOLUMAB    1/27/2023       Pre-Medications       palonosetron (ALOXI) 0.25 mg with Dexamethasone (DECADRON) 12 mg in NS 50 mL IVPB        famotidine (PF) injection 20 mg       diphenhydrAMINE (BENADRYL) 50 mg in sodium chloride 0.9% 50 mL IVPB       Chemotherapy       CARBOplatin (PARAPLATIN) 215 mg in sodium chloride 0.9% 271.5 mL chemo infusion       PACLitaxeL (TAXOL) 50 mg/m2 = 120 mg in sodium chloride 0.9% 250 mL chemo infusion    Therapy Plan Information  Flushes  heparin, porcine (PF) 100 unit/mL injection flush 500 Units  500 Units, Intravenous, Every visit  sodium chloride 0.9% flush 10 mL  10 mL, Intravenous, Every visit

## 2023-04-03 NOTE — PATIENT INSTRUCTIONS
Post-Esophagectomy Nutrition Guidelines  Nutrition is very important for healing and to prevent weight loss after esophageal surgery. Remember, because of the surgery, your esophagus may not be able to move foods as easily from your mouth to your stomach. Certain foods can block the esophagus or are difficult to swallow. Some people complain of food sticking or have midsternal (behind the breastbone) pain. Your surgeon may ask you to avoid solid foods entirely for the first several weeks at home. The guidelines below will ensure optimal diet tolerance after surgery.      The Diet Progression after Esophageal Surgery    Step 1: Clear Liquid Diet--  ? Broth, bouillon  ? Clear juices (apple, cranberry, grape, etc) diluted with water, Coconut Water (no pulp),  Gatorade/G2, Tea (green tea, herbal teas are best)  ? Jello, sugar-free Jello  ? Nutrition Beverages like Boost Breeze® or Ensure Clear ®      Step 2: Full Liquid Diet--  Everything on a clear liquid diet, plus:  ? Hot Cereal--cream of wheat, cream of rice, malt-o-meal  ? Milk, smooth yogurt, reduced fat smooth ice cream (vanilla, chocolate, etc), sherbet, pudding  ? Milkshakes and Malts (made with reduced fat ice cream and milk)  ? Strained or pureed smooth soups (not tomato-based)  ? Nutrition Beverages like Ensure®, Boost®, Orgain®, Premiere Protein®, Muscle Milk®    [email or web address]

## 2023-04-04 ENCOUNTER — PATIENT MESSAGE (OUTPATIENT)
Dept: ADMINISTRATIVE | Facility: OTHER | Age: 68
End: 2023-04-04
Payer: MEDICARE

## 2023-04-05 ENCOUNTER — PATIENT MESSAGE (OUTPATIENT)
Dept: PSYCHIATRY | Facility: CLINIC | Age: 68
End: 2023-04-05
Payer: MEDICARE

## 2023-04-05 ENCOUNTER — PATIENT MESSAGE (OUTPATIENT)
Dept: HEMATOLOGY/ONCOLOGY | Facility: CLINIC | Age: 68
End: 2023-04-05
Payer: MEDICARE

## 2023-04-05 ENCOUNTER — PATIENT MESSAGE (OUTPATIENT)
Dept: SURGERY | Facility: CLINIC | Age: 68
End: 2023-04-05
Payer: MEDICARE

## 2023-04-05 ENCOUNTER — PATIENT MESSAGE (OUTPATIENT)
Dept: ADMINISTRATIVE | Facility: OTHER | Age: 68
End: 2023-04-05
Payer: MEDICARE

## 2023-04-06 ENCOUNTER — PATIENT MESSAGE (OUTPATIENT)
Dept: ADMINISTRATIVE | Facility: OTHER | Age: 68
End: 2023-04-06
Payer: MEDICARE

## 2023-04-06 ENCOUNTER — PATIENT MESSAGE (OUTPATIENT)
Dept: HEMATOLOGY/ONCOLOGY | Facility: CLINIC | Age: 68
End: 2023-04-06
Payer: MEDICARE

## 2023-04-06 DIAGNOSIS — E11.65 UNCONTROLLED TYPE 2 DIABETES MELLITUS WITH HYPERGLYCEMIA: ICD-10-CM

## 2023-04-06 NOTE — TELEPHONE ENCOUNTER
Called him to review how he is tolerating liquid diet. So far he is drinking water, juice, cream soups. Denies pain. Denies N/V. Reports loose to soft bowel movements. Denies dysphagia and odynophagia  Home scale [phone]#   Remains afebrile.     Advance to soft food diet. Discussed food examples and reviewed post esophagectomy diet guidelines.   Decrease J feeds to 2 cartons/ nightly. Monitor home scale weight.    Qingr verbalized understanding.   He is aware of post op appt scheduled for Monday 4/17/23.

## 2023-04-07 ENCOUNTER — PATIENT MESSAGE (OUTPATIENT)
Dept: ADMINISTRATIVE | Facility: OTHER | Age: 68
End: 2023-04-07
Payer: MEDICARE

## 2023-04-08 ENCOUNTER — PATIENT MESSAGE (OUTPATIENT)
Dept: ADMINISTRATIVE | Facility: OTHER | Age: 68
End: 2023-04-08
Payer: MEDICARE

## 2023-04-09 ENCOUNTER — PATIENT MESSAGE (OUTPATIENT)
Dept: ADMINISTRATIVE | Facility: OTHER | Age: 68
End: 2023-04-09
Payer: MEDICARE

## 2023-04-11 ENCOUNTER — TELEPHONE (OUTPATIENT)
Dept: HEMATOLOGY/ONCOLOGY | Facility: CLINIC | Age: 68
End: 2023-04-11
Payer: MEDICARE

## 2023-04-11 ENCOUNTER — HOSPITAL ENCOUNTER (OUTPATIENT)
Dept: RADIOLOGY | Facility: HOSPITAL | Age: 68
Discharge: HOME | End: 2023-04-11
Attending: INTERNAL MEDICINE
Payer: MEDICARE

## 2023-04-11 DIAGNOSIS — C15.9 ESOPHAGEAL ADENOCARCINOMA: ICD-10-CM

## 2023-04-11 DIAGNOSIS — Z00.6 CLINICAL TRIAL PARTICIPANT: ICD-10-CM

## 2023-04-11 DIAGNOSIS — C15.9 ESOPHAGEAL ADENOCARCINOMA: Primary | ICD-10-CM

## 2023-04-11 PROCEDURE — 71260 CT THORAX DX C+: CPT | Mod: 26,Q1,, | Performed by: RADIOLOGY

## 2023-04-11 PROCEDURE — 25500020 PHARM REV CODE 255: Mod: Q1 | Performed by: INTERNAL MEDICINE

## 2023-04-11 PROCEDURE — 74177 CT ABD & PELVIS W/CONTRAST: CPT | Mod: 26,Q1,, | Performed by: RADIOLOGY

## 2023-04-11 PROCEDURE — 71260 CT THORAX DX C+: CPT | Mod: TC,Q1

## 2023-04-11 PROCEDURE — 74177 CT ABD & PELVIS W/CONTRAST: CPT | Mod: TC,Q1

## 2023-04-11 PROCEDURE — 71260 CT CHEST ABDOMEN PELVIS WITH CONTRAST (XPD): ICD-10-PCS | Mod: 26,Q1,, | Performed by: RADIOLOGY

## 2023-04-11 PROCEDURE — 74177 CT CHEST ABDOMEN PELVIS WITH CONTRAST (XPD): ICD-10-PCS | Mod: 26,Q1,, | Performed by: RADIOLOGY

## 2023-04-11 RX ADMIN — IOHEXOL 30 ML: 350 INJECTION, SOLUTION INTRAVENOUS at 11:04

## 2023-04-11 RX ADMIN — IOHEXOL 100 ML: 350 INJECTION, SOLUTION INTRAVENOUS at 11:04

## 2023-04-11 NOTE — TELEPHONE ENCOUNTER
: URIEL Manriquez MD  Treating Investigators: NIRAV Medrano MD  Surgical Oncologist: SARAH Brown M.D. / Gerri Zhang NP  Radiation Oncologist: Javier Moulton M.D, PhD    Protocol: ECOG-ACRIN [id number]  IRB#: [id number]  Patient ID: [id number]  Treatment: Arm B - Carbo+Taxol+Nivolumab    A Phase II/III Study of Dilcia-operative Nivolumab and Ipilimumab in Patients with Locoregional Esophageal and Gastroesophageal Junction Adenocarcinoma       Spoke w/ patient today re: Step 2 plans. Patient re-scheduled the following Pre-Treatment / C1D1 appointments as below. Patient will keep his C1D1 infusion appointment on [id number] @ 1400. Patient queried @ denies any questions or concerns at this time.    [id number] @ 0800 - Dr. Medrano PCTP  [id number] @ 0830 - Labs on 3rd Floor

## 2023-04-12 ENCOUNTER — PATIENT MESSAGE (OUTPATIENT)
Dept: ADMINISTRATIVE | Facility: OTHER | Age: 68
End: 2023-04-12
Payer: MEDICARE

## 2023-04-13 ENCOUNTER — PATIENT MESSAGE (OUTPATIENT)
Dept: ADMINISTRATIVE | Facility: OTHER | Age: 68
End: 2023-04-13
Payer: MEDICARE

## 2023-04-14 ENCOUNTER — PATIENT MESSAGE (OUTPATIENT)
Dept: ADMINISTRATIVE | Facility: OTHER | Age: 68
End: 2023-04-14
Payer: MEDICARE

## 2023-04-14 ENCOUNTER — RESEARCH ENCOUNTER (OUTPATIENT)
Dept: RESEARCH | Facility: HOSPITAL | Age: 68
End: 2023-04-14
Payer: MEDICARE

## 2023-04-14 ENCOUNTER — TELEPHONE (OUTPATIENT)
Dept: PHARMACY | Facility: CLINIC | Age: 68
End: 2023-04-14

## 2023-04-14 NOTE — PROGRESS NOTES
Post-Op Follow-up Visit:   4/17/2023  Patient ID: Lukasz Person is a 68 y.o. male, born 1955    Chief Complaint   Patient presents with    Post-op Evaluation     8/2022: PCP for progressive dysphagia  11/17/22: EGD:nodular mucosa in the esophagus at the GE junction. Esophageal mucosal changes suggestive of short-segment Paredes's esophagus. Biopsies positive for adenocarcinoma.   12/7/22: EUS: Partially obstructing, malignant esophageal tumor was found in the lower third of the esophagus staged T3 N0 Mx by endosonographic criteria.   12/8/22: PET CT: Distal esophageal wall thickening and hypermetabolic activity in keeping with known esophageal cancer with no definite evidence of metastatic disease  Neoadj CRT per Dr. Medrano and Kenney  3/14/22: robotic esophagectomy with J tube per Dr. Brown  3/18/2023: s/p injection laryngoplasty to left true vocal fold per Dr. Leal  3/27/23: Esophragram with no evidence of postoperative leak.    Interval History: This 67 y/o gentleman returns to clinic from [facility] with his wife. He was last seen in clinic with Dr. Brown on 4/3/2023 when diet advanced to full liquids. His diet was advanced to soft food and J feeds reduced to 2 cartons/night via phone conversation on 4/6/23. Overall he is feeling good.  He is tolerating J feeds Maricarmen [employer] 1.4, 2 cartons overnight from 10pm-7am. Denies missing any feedings. Goes to sleep with HOB elevated but states he wakes up flat. He is not eating by mouth after 8pm. He reports a decent appetite, trying to eat small portions but intermittent experiences nausea and diarrhea after eating. Denies vomit.   His weight is down 2# on clinic scale and home scale weight this morning 224.5#.   Oral diet recall includes: scrambled eggs, waffle, sahu's pie, Italian wedding soup. Occ drinks Glucerna. Denies odynophagia but admits to feeling like pills get stuck. + coughing but mostly nonproductive.   He is monitoring BG approximately 1 hour  after d/c J feeds, reviewed his SMBG log today in clinic, range 140-152, no hypoglycemia noted.     Physical Exam:  BP (!) 120/59 (BP Location: Left arm, Patient Position: Sitting)   Pulse 84   Ht 6' (1.829 m)   Wt 103 kg (226 lb 15.4 oz)   SpO2 99%   BMI 30.78 kg/m²     General:  Non-toxic, ambulatory  Abd:  Soft, non-tender  Incision:  J tube sutured in place, flushed easily with 60cc water. Abd trocar sites healing without erythema or exudate.     Pathology:  Procedure - Esophageal gastrectomy Tumor site - GE Junction   Relationship of tumor to esophagogastric junction - Lesion is central at GE junction   Tumor size - 3.0 x 3.1 cm  Histologic type - Adenocarcinoma   Histologic grade - Moderately differentiated   Microscopic tumor extension - Tumor invades adventitia   Margins - All margins of resection are uninvolved, proximal, distal and adventitial margins are negative   Treatment effect - Extensive residual cancer with no evident tumor regression (poor or no response, score 3) Lymphovascular invasion - Not identified   Pathologic staging - yp T3 N0 Mx   Lymph nodes examined - 12 Lymph nodes involved - 0   Additional pathologic findings - Intestinal metaplasia present      ICD-10-CM ICD-9-CM    1. Esophageal adenocarcinoma  C15.9 150.9       2. Severe protein-calorie malnutrition  E43 262       3. Jejunostomy tube present  Z93.4 V44.4       4. On enteral nutrition at home  Z78.9 V49.89       Plan   D/c J feeds. Instructed to flush J tube BID with water  Advance to regular diet. Discussed post esophagectomy diet guidelines and written instructions provided today in AVS.   Add protein supplement DAILY, coupons for Glucerna provided at his request.   F/u with CORINA Clark RD today as scheduled.   Increase physical activity but no heavy lifting until 6 weeks post op     Follow up in about 2 weeks (around 5/1/2023).  He is scheduled to start adj immunotherapy 5/23/2023 per Dr. Medrano.   Scheduled for f/u with  ENT, Dr. Valdivia, next month.     Questions were asked and answered to patient's satisfaction.      Discussed case with Dr. Brown, who agrees with the above plan of care.         Syeda Galvan NP  Upper GI / Hepatobiliary Surgical Oncology  [facility] [place], [place]  Office: [phone]  Fax: [phone]

## 2023-04-14 NOTE — PROGRESS NOTES
: URIEL Manriquez MD  Treating Investigators: NIRAV Medrano MD  Surgical Oncologist: SARAH Brown M.D. / Gerri Zhang NP  Radiation Oncologist: Javier Moulton M.D, PhD    Protocol: ECOG-ACRIN [id number]  IRB#: [id number]  Patient ID: [id number]  Treatment: Arm B - Carbo+Taxol+Nivolumab  Treatment: Arm C - Nivolumab Monotherapy    A Phase II/III Study of Dilcia-operative Nivolumab and Ipilimumab in Patients with Locoregional Esophageal and Gastroesophageal Junction Adenocarcinoma       Step 2 Randomization Eligibility: 14Apr2023  Patient completed eligibility MD visit with ECOG on 03Apr2023 as part of his 3 week post-op visit, then completed eligibility / Step 2 pre-treatment labs & CT C/A/P on 11Apr2023. CRC & second CRC performed double-eligibility today, and patient appears eligible for Step 2 randomization. Eligibility reviewed with treating investigator, whom also confirms patient eligibility as documented as a co-signature on this note.   Patient was randomized in OPEN to Arm C, Nivolumab monotherapy today. CRC spoke with patient today @ 0920 to inform him of his treatment assignment. Patient verbalized his understanding & confirms C1D1 appointments below.     Patient was encouraged to contact CRC or Neva Medrano or Stephanie's team with any questions or concerns & was reminded of clinic's 24/7 emergency contact number. Patient verbalized understanding & denies any additional questions at this time.         Step 2 C1D1 Assessments:  [id number] @ 0800 - Marcela PCTP  [id number] @ 0830 - labs @ [facility] 3rd floor -- GIVE PATIENT STOOL COLLECTION KIT TO RETURN ON 4/24  [id number] @ 1400 - infusion (treatment plan not confirmed - Patient to be randomized after labs & scans)                 Complete Baseline Medical History, Adverse Events, and Concomitant Medications are located in patient's shadow chart

## 2023-04-15 ENCOUNTER — PATIENT MESSAGE (OUTPATIENT)
Dept: ADMINISTRATIVE | Facility: OTHER | Age: 68
End: 2023-04-15
Payer: MEDICARE

## 2023-04-16 ENCOUNTER — PATIENT MESSAGE (OUTPATIENT)
Dept: ADMINISTRATIVE | Facility: OTHER | Age: 68
End: 2023-04-16
Payer: MEDICARE

## 2023-04-17 ENCOUNTER — CLINICAL SUPPORT (OUTPATIENT)
Dept: HEMATOLOGY/ONCOLOGY | Facility: CLINIC | Age: 68
End: 2023-04-17
Payer: MEDICARE

## 2023-04-17 ENCOUNTER — PATIENT MESSAGE (OUTPATIENT)
Dept: ADMINISTRATIVE | Facility: OTHER | Age: 68
End: 2023-04-17
Payer: MEDICARE

## 2023-04-17 ENCOUNTER — OFFICE VISIT (OUTPATIENT)
Dept: SURGERY | Facility: CLINIC | Age: 68
End: 2023-04-17
Payer: MEDICARE

## 2023-04-17 VITALS
HEIGHT: 72 IN | BODY MASS INDEX: 30.74 KG/M2 | SYSTOLIC BLOOD PRESSURE: 120 MMHG | DIASTOLIC BLOOD PRESSURE: 59 MMHG | HEART RATE: 84 BPM | WEIGHT: 226.94 LBS | OXYGEN SATURATION: 99 %

## 2023-04-17 DIAGNOSIS — E43 SEVERE PROTEIN-CALORIE MALNUTRITION: ICD-10-CM

## 2023-04-17 DIAGNOSIS — C15.9 ESOPHAGEAL ADENOCARCINOMA: Primary | Chronic | ICD-10-CM

## 2023-04-17 DIAGNOSIS — C15.9 ESOPHAGEAL ADENOCARCINOMA: Primary | ICD-10-CM

## 2023-04-17 DIAGNOSIS — Z78.9 ON ENTERAL NUTRITION AT HOME: ICD-10-CM

## 2023-04-17 DIAGNOSIS — Z93.4 JEJUNOSTOMY TUBE PRESENT: ICD-10-CM

## 2023-04-17 DIAGNOSIS — Z71.3 NUTRITIONAL COUNSELING: ICD-10-CM

## 2023-04-17 PROCEDURE — 99211 OFF/OP EST MAY X REQ PHY/QHP: CPT | Mod: PBBFAC,27 | Performed by: DIETITIAN, REGISTERED

## 2023-04-17 PROCEDURE — 97803 MED NUTRITION INDIV SUBSEQ: CPT | Mod: PBBFAC,59 | Performed by: DIETITIAN, REGISTERED

## 2023-04-17 PROCEDURE — 99999 PR PBB SHADOW E&M-EST. PATIENT-LVL IV: CPT | Mod: PBBFAC,,, | Performed by: NURSE PRACTITIONER

## 2023-04-17 PROCEDURE — 99024 POSTOP FOLLOW-UP VISIT: CPT | Mod: POP,,, | Performed by: NURSE PRACTITIONER

## 2023-04-17 PROCEDURE — 99024 PR POST-OP FOLLOW-UP VISIT: ICD-10-PCS | Mod: POP,,, | Performed by: NURSE PRACTITIONER

## 2023-04-17 PROCEDURE — 99999 PR PBB SHADOW E&M-EST. PATIENT-LVL I: ICD-10-PCS | Mod: PBBFAC,,, | Performed by: DIETITIAN, REGISTERED

## 2023-04-17 PROCEDURE — 99999 PR PBB SHADOW E&M-EST. PATIENT-LVL I: CPT | Mod: PBBFAC,,, | Performed by: DIETITIAN, REGISTERED

## 2023-04-17 PROCEDURE — 99999 PR PBB SHADOW E&M-EST. PATIENT-LVL IV: ICD-10-PCS | Mod: PBBFAC,,, | Performed by: NURSE PRACTITIONER

## 2023-04-17 PROCEDURE — 99214 OFFICE O/P EST MOD 30 MIN: CPT | Mod: PBBFAC | Performed by: NURSE PRACTITIONER

## 2023-04-17 NOTE — PROGRESS NOTES
Oncology Nutrition Assessment for Medical Nutrition Therapy  Follow-up Visit    Lukasz Person   1955    Referring Provider: No ref. provider found      Reason for Visit: nutrition counseling and education    PMHx:   Past Medical History:   Diagnosis Date    Anticoagulant long-term use     Diabetes mellitus     Onset late 50s/early 60s    Hyperlipidemia     Hypertension     Onset late 50s/early 60s    Kidney stones     Sleep apnea     since 2006       Nutrition Assessment    This is a 68 y.o.male with a medical diagnosis of esophageal adenocarcinoma s/p neoadjuvant chemoradiation and now s/p esophagectomy 3/14. He was discharged NPO and on TF. He is known to me from previous appointments. At our last appointment he was cleared for FL diet with the plan to advance to soft later that week. We discussed if advancement went well he could decrease TF to 2 cartons/day and monitor weight. He reports this has gone pretty well but having some nausea after meals. He also reports occasional diarrhea as well. He is eating 2 meals/day and snacks. His wife has made him smoothies a couple times (fruit, almond milk, and peanut butter). He also has Boost that he drinks sometimes. Breakfast is usually an eggo or scrambled egg with cheese. He reports this usually goes well. Then he snacks some throughout the day, popsicles or peanut butter crackers. He then will have another meal, borden's pie or Italian wedding soup recently. He reports nausea after eating these. He reports portions of both were small. He drinks mostly water but isn't sure how much, isn't keeping track.    Weight: 103 kg (226 lb 15.4 oz)  Height: 6' (182.9 cm)  BMI: 30.78    Usual BW: 260-265lb  Weight Change: 30lb loss over 3 months (15lb of which were lost after surgery)    Allergies: Patient has no known allergies.    Current Medications:  Current Outpatient Medications:     allopurinoL (ZYLOPRIM) 100 MG tablet, TAKE 1 TABLET BY MOUTH EVERY DAY, Disp: 30  tablet, Rfl: 10    blood sugar diagnostic (ACCU-CHEK ANTONELLA PLUS TEST STRP) Strp, Use to test CBG four times daily E11.65, Disp: 400 strip, Rfl: 1    blood-glucose meter kit, Checks blood sugars 1x/daily., Disp: 1 each, Rfl: 12    lancets (ACCU-CHEK SOFTCLIX LANCETS) Misc, Use to test CBG 4 times daily E11.65, Disp: 400 each, Rfl: 1    lisinopriL-hydrochlorothiazide (PRINZIDE,ZESTORETIC) 20-25 mg Tab, TAKE 1 TABLET BY MOUTH EVERY DAY, Disp: 90 tablet, Rfl: 3    metFORMIN 500 mg/5 mL Soln, Take 1,000 mg by mouth 2 (two) times a day., Disp: 1800 mL, Rfl: 3    metoprolol succinate (TOPROL-XL) 25 MG 24 hr tablet, Take 1 tablet (25 mg total) by mouth once daily., Disp: 30 tablet, Rfl: 11    nitroGLYCERIN (NITROSTAT) 0.4 MG SL tablet, Place 1 tablet (0.4 mg total) under the tongue every 5 (five) minutes as needed for Chest pain. If you need a third tablet, call 911, Disp: 60 tablet, Rfl: 12    omeprazole (PRILOSEC) 40 MG capsule, Take 1 capsule (40 mg total) by mouth once daily., Disp: 90 capsule, Rfl: 3    rivaroxaban (XARELTO) 20 mg Tab, Take 1 tablet (20 mg total) by mouth daily with dinner or evening meal., Disp: 30 tablet, Rfl: 11    rosuvastatin (CRESTOR) 20 MG tablet, TAKE 1 TABLET(20 MG) BY MOUTH EVERY DAY (Patient taking differently: Take 20 mg by mouth every evening.), Disp: 30 tablet, Rfl: 11    tamsulosin (FLOMAX) 0.4 mg Cap, Take 1 capsule (0.4 mg total) by mouth once daily. (Patient taking differently: Take 0.4 mg by mouth nightly.), Disp: 30 capsule, Rfl: 11    testosterone (ANDROGEL) 20.25 mg/1.25 gram (1.62 %) GlPm, Apply 4 pumps to shoulders daily, Disp: 2 each, Rfl: 5    Food/medication interactions noted: none    Vitamins/Supplements: none    Labs: Reviewed    Nutrition Diagnosis    Problem: altered GI function  Etiology (related to):  NPO s/p esophagectomy  Signs/Symptoms (as evidenced by): requiring tube feeding for nutrition support    Nutrition Intervention    Nutrition Prescription   2369 kcals  (23kcal/kg)  93g protein (0.9g/kg)   2369mL fluid (23mL/kg)    Recommendations:  Stop TF per surg onc  Flush J-tube with at least 60mL water before and after feeds  Discussed eating small frequent meals/snacks  Make meals/snacks high in calories and protein - examples discussed  Continue to supplement with Boost and homemade smoothies - discussed doing one of these daily (if smoothie, use Glucerna instead of almond milk)  Drink at least 64oz fluid/day    Materials Provided/Reviewed: none    Nutrition Monitoring and Evaluation    Monitor: energy intake, rate/formulation of tube feeding, and weight status    Goals: weight maintenance    Follow up: in 1-2 weeks    Communication to referring provider/care team: note available in chart; discussed with CORINA Galvan NP    Counseling time: 15 minutes    Carmen Clark MS, RD, LDN  [phone]

## 2023-04-17 NOTE — PATIENT INSTRUCTIONS
Hold tube feeding. Flush J tube with 1 syringe of water twice daily, once in morning and once in evening.   Advance to step 4: regular diet. See below for examples.         Post-Esophagectomy Nutrition Guidelines  Nutrition is very important for healing and to prevent weight loss after esophageal surgery. Remember, because of the surgery, your esophagus may not be able to move foods as easily from your mouth to your stomach. Certain foods can block the esophagus or are difficult to swallow. Some people complain of food sticking or have midsternal (behind the breastbone) pain. Your surgeon may ask you to avoid solid foods entirely for the first several weeks at home. The guidelines below will ensure optimal diet tolerance after surgery.      The Diet Progression after Esophageal Surgery    Step 1: Clear Liquid Diet  ? Broth, bouillon  ? Clear juices (apple, cranberry, grape, etc) diluted with water, Coconut Water (no pulp),  Gatorade/G2, Tea (green tea, herbal teas are best)  ? Jello, sugar-free Jello  ? Nutrition Beverages like Boost Breeze® or Ensure Clear ®      Step 2: Full Liquid Diet  Everything on a clear liquid diet, plus:  ? Hot Cereal--cream of wheat, cream of rice, malt-o-meal  ? Milk, smooth yogurt, reduced fat smooth ice cream (vanilla, chocolate, etc), sherbet, pudding  ? Milkshakes and Malts (made with reduced fat ice cream and milk)  ? Strained or pureed smooth soups (not tomato-based)  ? Nutrition Beverages like Ensure®, Boost®, Orgain®, Premiere Protein®, Muscle Milk®      Step 3: Soft Solid Diet  Everything on a clear liquid and full liquid diet, plus these food items which are better tolerated:    Breads & Cereal   Hot cereals, dry cereals softened with milk or lactose free milk  Soft cooked pasta or rice; plain crackers    Fruits   Unsweetened canned fruits, applesauce  Fresh fruits (with pits/seeds and skins removed) e.g. banana, peach, melon, ripe pear, apple, etc.  Fruit juices (diluted with  water)    Vegetables   Soft cooked vegetables, such as:  zucchini, winter squash, carrots, potatoes (peeled), sweet potatoes (peeled), asparagus tips, green beans, turnips  Vegetable juices, if not tomato based    Meats/ Alternatives   Chopped or ground meats, boneless fish  Moist casseroles and stews  Soups prepared with soft foods  Eggs, all preparation except fried  Cheese, Tofu  Soft cooked lentils, like in soup  Smooth/creamy nut butters    Dairy (If not lactose intolerant after surgery)  2% or less fat milk or lactose-free milk  Soy milk, Rice milk, almond milk, coconut milk  Low fat yogurt  Reduced-fat ice cream, frozen yogurt  Puddings, custards      Miscellaneous   Small portions of cookies, cakes (no frosting), pies, or other desserts  Teas--green, herbal, weakly brewed black teas  Light juices, low sugar beverages      Step 4: Regular Diet, following the Tips for Optimal Diet Tolerance   Tips for Optimal Diet Tolerance after Esophageal Surgery  ? Soft and moist foods will be easier to get down your throat and esophagus  ? After surgery, you may experience early fullness and may not tolerate certain foods. Often these feelings go away over time.  ? If a food causes you discomfort, wait 1-2 weeks, then retry the food.  ? Take small bites and chew your foods very well.  ? Stop eating when you start to feel full.  ? Try eating 6 small, nutritious meals and snacks during the day, rather than 3 larger meals.  ? Drink most of your liquids between meals.  ? Sit upright after eating and stay in a sitting or standing position for 45-60 minutes after  eating and avoid eating 3 hours before bedtime  ? Your body will require more protein and calories to aid in healing. (Limit foods that are high  in calories but of little nutritional value such as sweets, candy, chips, etc.)  ? If your appetite is poor and weight loss occurs, a nutritional beverage like Ensure® or  Boost® may be needed.      [email or web address]  cancernutrition

## 2023-04-18 ENCOUNTER — PATIENT MESSAGE (OUTPATIENT)
Dept: ADMINISTRATIVE | Facility: OTHER | Age: 68
End: 2023-04-18
Payer: MEDICARE

## 2023-04-19 ENCOUNTER — PATIENT MESSAGE (OUTPATIENT)
Dept: ADMINISTRATIVE | Facility: OTHER | Age: 68
End: 2023-04-19
Payer: MEDICARE

## 2023-04-20 ENCOUNTER — TELEPHONE (OUTPATIENT)
Dept: RADIATION ONCOLOGY | Facility: CLINIC | Age: 68
End: 2023-04-20
Payer: MEDICARE

## 2023-04-20 ENCOUNTER — PATIENT MESSAGE (OUTPATIENT)
Dept: ADMINISTRATIVE | Facility: OTHER | Age: 68
End: 2023-04-20
Payer: MEDICARE

## 2023-04-20 NOTE — TELEPHONE ENCOUNTER
Telephone call post treatment follow-up        Radiation Oncology Follow-up Note        Date of Service: 04/20/2023     Chief Complaint: adenocarcinoma of the distal 1/3 esophagus/GEJ s/p preop CRT     Reason for visit: post EBRT toxicity check     Referring Physician: Dr Medrano ([facility])     Implantable devices: denies     Therapy to Date:  Course: C1 CHEST 2022    Treatment Site Ref. ID Energy Dose/Fx (Gy) #Fx Dose Correction (Gy) Total Dose (Gy) Start Date End Date Elapsed Days   IM Esophagus [id number] 6X 1.8 23 / 23 0 41.4 12/29/2022 2/1/2023 34        Diagnosis/Assessment:   Lukasz Person is a 67 y.o. man with Stage III (cT3, cN0, cM0) distal 1/3 esophageal/ GEJ Siewert I adenocarcinoma, MMR intact.  PMH left kidney stones, CAD s/p stents on plavix, paroxysmal afib (cardioversion 4/2022), KUMAR on CPAP, HTN, HLD, T2DM, CKD3, hyperparathyroidism s/p parathyroidectomy   He was enrolled in Arm B - Carbo+Taxol+Nivolumab of ECOG-ACRIN [id number].  He is s/p preoperative chemoradiation Carbo+Taxol+Nivolumab 41.4Gy/23fx to the esophagus and involved regional lymph nodes completed 2/1/2023.    Now s/p robotic esophagectomy with J tube (Dr. Brown, 3/14/22) with G2 Adenocarcinoma of GEJ, SM-, LVI-, yp T3 N0, 0/12 LN+, with extensive residual cancer and no evident tumor regression.    Pending systemic therapy per Dr Medrano     Plan     - Follow up with Dr Medrano and Dr Brown  - Return to radiation oncology PRN    Interval history   2/1/2023 Tolerated radiation therapy well with expected toxicities, without significant treatment delays or breaks: odynophagia  c/w magic mouthwash PRN    3/14/22: robotic esophagectomy with J tube (Dr. 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 Leal)    3/27/23: FL esophragram  with no evidence of postoperative leak.    4/3/2023 [facility] fu nathalia    per the [id number] protocol will proceed with randomization to adjuvant nivolumab +/- ipilimumab    4/17/2023 last surgert fu   d/c J feeds, advance to regular diet       Subjective:   Over the phone the patient reports feeling well overall.  He reports that prior to surgery, after completing and recovering from side effects of preop chemoRT , he has no dysphagia or odynophagia, felt normal.      The patient denies other major complaints today     Current Outpatient Medications on File Prior to Visit   Medication Sig Dispense Refill    allopurinoL (ZYLOPRIM) 100 MG tablet TAKE 1 TABLET BY MOUTH EVERY DAY 30 tablet 10    blood sugar diagnostic (ACCU-CHEK ANTONELLA PLUS TEST STRP) Strp Use to test CBG four times daily E11.65 400 strip 1    blood-glucose meter kit Checks blood sugars 1x/daily. 1 each 12    lancets (ACCU-CHEK SOFTCLIX LANCETS) Misc Use to test CBG 4 times daily E11.65 400 each 1    lisinopriL-hydrochlorothiazide (PRINZIDE,ZESTORETIC) 20-25 mg Tab TAKE 1 TABLET BY MOUTH EVERY DAY 90 tablet 3    metFORMIN 500 mg/5 mL Soln Take 1,000 mg by mouth 2 (two) times a day. 1800 mL 3    metoprolol succinate (TOPROL-XL) 25 MG 24 hr tablet Take 1 tablet (25 mg total) by mouth once daily. 30 tablet 11    nitroGLYCERIN (NITROSTAT) 0.4 MG SL tablet Place 1 tablet (0.4 mg total) under the tongue every 5 (five) minutes as needed for Chest pain. If you need a third tablet, call 911 60 tablet 12    omeprazole (PRILOSEC) 40 MG capsule Take 1 capsule (40 mg total) by mouth once daily. 90 capsule 3    rivaroxaban (XARELTO) 20 mg Tab Take 1 tablet (20 mg total) by mouth daily with dinner or evening meal. 30 tablet 11    rosuvastatin (CRESTOR) 20 MG tablet TAKE 1 TABLET(20 MG) BY MOUTH EVERY DAY (Patient taking differently: Take 20 mg by mouth every evening.) 30 tablet 11    tamsulosin (FLOMAX) 0.4 mg Cap Take 1 capsule (0.4 mg total) by mouth once daily. (Patient  taking differently: Take 0.4 mg by mouth nightly.) 30 capsule 11    testosterone (ANDROGEL) 20.25 mg/1.25 gram (1.62 %) GlPm Apply 4 pumps to shoulders daily 2 each 5     No current facility-administered medications on file prior to visit.       Review of patient's allergies indicates:  No Known Allergies        ROS  Negative unless as above  HPI:   Lukasz Person is a 67 y.o. man with recent diagnosis of esophageal cancer after presenting with dysphagia in July 2022      Oncologic history:  11/17/2022 upper EGD (Dr Gonzales)  Friable (with contact bleeding), nodular mucosa in the esophagus at the GE junction  Path Adenocarcinoma, MMR intact     12/7/2022 upper EGD with US (Dr Main)              Paredes's esophagus in lower third of esophagus, 4cm long              medium-sized, submucosal partially obstructing circumferential mass no bleeding and no stigmata of recent bleeding in lower third of esophagus, 39 to 42 cm from the incisors.              uT3N0     12/8/2022 PET CT              distal esophageal wall thickening SUV max 6.2  tiny adjacent LN, no appreciable uptake inc a gastrohepatic LN and perigastric LN  no definite evidence of metastatic disease                   12/12/2022 surgonc (Dr Brown) eval                12/12/2022 GI medonc (Dr Medrano_): plan for CROSS         Review of Systems     Negative unless as above         Objective:   Physical Exam  No PE or VS     Pathology: I have personally reviewed the patient's available pathology and summarized pertinent findings above in HPI.      Thank you for the opportunity to care for this patient. Please do not hesitate to contact me with any questions.     Javier Moulton MD/PhD

## 2023-04-21 ENCOUNTER — PATIENT MESSAGE (OUTPATIENT)
Dept: ADMINISTRATIVE | Facility: OTHER | Age: 68
End: 2023-04-21
Payer: MEDICARE

## 2023-04-21 ENCOUNTER — HOSPITAL ENCOUNTER (OUTPATIENT)
Dept: CARDIOLOGY | Facility: CLINIC | Age: 68
Discharge: HOME | End: 2023-04-21
Payer: MEDICARE

## 2023-04-21 ENCOUNTER — OFFICE VISIT (OUTPATIENT)
Dept: ELECTROPHYSIOLOGY | Facility: CLINIC | Age: 68
End: 2023-04-21
Payer: MEDICARE

## 2023-04-21 ENCOUNTER — OFFICE VISIT (OUTPATIENT)
Dept: HEMATOLOGY/ONCOLOGY | Facility: CLINIC | Age: 68
End: 2023-04-21
Payer: MEDICARE

## 2023-04-21 ENCOUNTER — RESEARCH ENCOUNTER (OUTPATIENT)
Dept: RESEARCH | Facility: HOSPITAL | Age: 68
End: 2023-04-21
Payer: MEDICARE

## 2023-04-21 VITALS
OXYGEN SATURATION: 97 % | SYSTOLIC BLOOD PRESSURE: 101 MMHG | HEART RATE: 78 BPM | RESPIRATION RATE: 18 BRPM | DIASTOLIC BLOOD PRESSURE: 56 MMHG | TEMPERATURE: 99 F | WEIGHT: 220.88 LBS | HEIGHT: 72 IN | BODY MASS INDEX: 29.92 KG/M2

## 2023-04-21 VITALS
WEIGHT: 221.56 LBS | HEART RATE: 71 BPM | SYSTOLIC BLOOD PRESSURE: 112 MMHG | BODY MASS INDEX: 29.36 KG/M2 | HEIGHT: 73 IN | DIASTOLIC BLOOD PRESSURE: 58 MMHG

## 2023-04-21 DIAGNOSIS — I10 PRIMARY HYPERTENSION: ICD-10-CM

## 2023-04-21 DIAGNOSIS — I50.20 HFREF (HEART FAILURE WITH REDUCED EJECTION FRACTION): ICD-10-CM

## 2023-04-21 DIAGNOSIS — N18.30 STAGE 3 CHRONIC KIDNEY DISEASE, UNSPECIFIED WHETHER STAGE 3A OR 3B CKD: ICD-10-CM

## 2023-04-21 DIAGNOSIS — N18.30 TYPE 2 DIABETES MELLITUS WITH STAGE 3 CHRONIC KIDNEY DISEASE, WITHOUT LONG-TERM CURRENT USE OF INSULIN, UNSPECIFIED WHETHER STAGE 3A OR 3B CKD: ICD-10-CM

## 2023-04-21 DIAGNOSIS — Z87.891 HISTORY OF TOBACCO USE: ICD-10-CM

## 2023-04-21 DIAGNOSIS — G47.33 OSA (OBSTRUCTIVE SLEEP APNEA): ICD-10-CM

## 2023-04-21 DIAGNOSIS — I25.10 CORONARY ARTERY DISEASE INVOLVING NATIVE CORONARY ARTERY OF NATIVE HEART WITHOUT ANGINA PECTORIS: ICD-10-CM

## 2023-04-21 DIAGNOSIS — I48.0 PAROXYSMAL ATRIAL FIBRILLATION: Primary | ICD-10-CM

## 2023-04-21 DIAGNOSIS — Z98.890 HISTORY OF ESOPHAGECTOMY: ICD-10-CM

## 2023-04-21 DIAGNOSIS — I70.0 AORTIC ATHEROSCLEROSIS: ICD-10-CM

## 2023-04-21 DIAGNOSIS — K04.7 DENTAL INFECTION: ICD-10-CM

## 2023-04-21 DIAGNOSIS — E66.3 OVERWEIGHT (BMI 25.0-29.9): ICD-10-CM

## 2023-04-21 DIAGNOSIS — I35.8 AORTIC VALVE SCLEROSIS: ICD-10-CM

## 2023-04-21 DIAGNOSIS — Z90.49 HISTORY OF ESOPHAGECTOMY: ICD-10-CM

## 2023-04-21 DIAGNOSIS — Z79.01 CHRONIC ANTICOAGULATION: ICD-10-CM

## 2023-04-21 DIAGNOSIS — N18.30 CKD STAGE 3 DUE TO TYPE 2 DIABETES MELLITUS: ICD-10-CM

## 2023-04-21 DIAGNOSIS — E78.5 HYPERLIPIDEMIA ASSOCIATED WITH TYPE 2 DIABETES MELLITUS: ICD-10-CM

## 2023-04-21 DIAGNOSIS — E11.22 TYPE 2 DIABETES MELLITUS WITH STAGE 3 CHRONIC KIDNEY DISEASE, WITHOUT LONG-TERM CURRENT USE OF INSULIN, UNSPECIFIED WHETHER STAGE 3A OR 3B CKD: ICD-10-CM

## 2023-04-21 DIAGNOSIS — D49.9 IMMUNODEFICIENCY SECONDARY TO NEOPLASM: ICD-10-CM

## 2023-04-21 DIAGNOSIS — Z98.890 HISTORY OF PARATHYROIDECTOMY: ICD-10-CM

## 2023-04-21 DIAGNOSIS — Z90.89 HISTORY OF PARATHYROIDECTOMY: ICD-10-CM

## 2023-04-21 DIAGNOSIS — I48.0 PAROXYSMAL ATRIAL FIBRILLATION: ICD-10-CM

## 2023-04-21 DIAGNOSIS — I42.8 NON-ISCHEMIC CARDIOMYOPATHY: Chronic | ICD-10-CM

## 2023-04-21 DIAGNOSIS — Z87.448 HISTORY OF PYELONEPHRITIS: ICD-10-CM

## 2023-04-21 DIAGNOSIS — D84.81 IMMUNODEFICIENCY SECONDARY TO NEOPLASM: ICD-10-CM

## 2023-04-21 DIAGNOSIS — E78.2 MIXED HYPERLIPIDEMIA: ICD-10-CM

## 2023-04-21 DIAGNOSIS — Z98.890 HISTORY OF LITHOTRIPSY: ICD-10-CM

## 2023-04-21 DIAGNOSIS — E11.22 CKD STAGE 3 DUE TO TYPE 2 DIABETES MELLITUS: ICD-10-CM

## 2023-04-21 DIAGNOSIS — J38.00 VOCAL CORD PARALYSIS: ICD-10-CM

## 2023-04-21 DIAGNOSIS — C15.9 ESOPHAGEAL ADENOCARCINOMA: Primary | ICD-10-CM

## 2023-04-21 DIAGNOSIS — E11.69 HYPERLIPIDEMIA ASSOCIATED WITH TYPE 2 DIABETES MELLITUS: ICD-10-CM

## 2023-04-21 DIAGNOSIS — I15.2 HYPERTENSION ASSOCIATED WITH DIABETES: ICD-10-CM

## 2023-04-21 DIAGNOSIS — E21.0 HYPERPARATHYROIDISM, PRIMARY: ICD-10-CM

## 2023-04-21 DIAGNOSIS — J38.3: ICD-10-CM

## 2023-04-21 DIAGNOSIS — C15.9 ESOPHAGEAL ADENOCARCINOMA: Chronic | ICD-10-CM

## 2023-04-21 DIAGNOSIS — E11.59 HYPERTENSION ASSOCIATED WITH DIABETES: ICD-10-CM

## 2023-04-21 PROCEDURE — 99213 OFFICE O/P EST LOW 20 MIN: CPT | Mod: PBBFAC,Q1 | Performed by: STUDENT IN AN ORGANIZED HEALTH CARE EDUCATION/TRAINING PROGRAM

## 2023-04-21 PROCEDURE — 99999 PR PBB SHADOW E&M-EST. PATIENT-LVL III: ICD-10-PCS | Mod: PBBFAC,,, | Performed by: STUDENT IN AN ORGANIZED HEALTH CARE EDUCATION/TRAINING PROGRAM

## 2023-04-21 PROCEDURE — 99999 PR PBB SHADOW E&M-EST. PATIENT-LVL IV: ICD-10-PCS | Mod: PBBFAC,,, | Performed by: INTERNAL MEDICINE

## 2023-04-21 PROCEDURE — 99214 PR OFFICE/OUTPT VISIT, EST, LEVL IV, 30-39 MIN: ICD-10-PCS | Mod: S$PBB,,, | Performed by: STUDENT IN AN ORGANIZED HEALTH CARE EDUCATION/TRAINING PROGRAM

## 2023-04-21 PROCEDURE — 93010 RHYTHM STRIP: ICD-10-PCS | Mod: S$PBB,,, | Performed by: INTERNAL MEDICINE

## 2023-04-21 PROCEDURE — 99999 PR PBB SHADOW E&M-EST. PATIENT-LVL IV: CPT | Mod: PBBFAC,,, | Performed by: INTERNAL MEDICINE

## 2023-04-21 PROCEDURE — 99999 PR PBB SHADOW E&M-EST. PATIENT-LVL III: CPT | Mod: PBBFAC,,, | Performed by: STUDENT IN AN ORGANIZED HEALTH CARE EDUCATION/TRAINING PROGRAM

## 2023-04-21 PROCEDURE — 99215 OFFICE O/P EST HI 40 MIN: CPT | Mod: Q1,S$PBB,, | Performed by: INTERNAL MEDICINE

## 2023-04-21 PROCEDURE — 93010 ELECTROCARDIOGRAM REPORT: CPT | Mod: S$PBB,,, | Performed by: INTERNAL MEDICINE

## 2023-04-21 PROCEDURE — 99214 OFFICE O/P EST MOD 30 MIN: CPT | Mod: PBBFAC,27,Q1 | Performed by: INTERNAL MEDICINE

## 2023-04-21 PROCEDURE — 99214 OFFICE O/P EST MOD 30 MIN: CPT | Mod: S$PBB,,, | Performed by: STUDENT IN AN ORGANIZED HEALTH CARE EDUCATION/TRAINING PROGRAM

## 2023-04-21 PROCEDURE — 93005 ELECTROCARDIOGRAM TRACING: CPT | Mod: PBBFAC | Performed by: INTERNAL MEDICINE

## 2023-04-21 PROCEDURE — 99215 PR OFFICE/OUTPT VISIT, EST, LEVL V, 40-54 MIN: ICD-10-PCS | Mod: Q1,S$PBB,, | Performed by: INTERNAL MEDICINE

## 2023-04-21 RX ORDER — AMOXICILLIN 500 MG/1
500 TABLET, FILM COATED ORAL 3 TIMES DAILY
COMMUNITY
End: 2023-05-29 | Stop reason: ALTCHOICE

## 2023-04-21 NOTE — PROGRESS NOTES
": URIEL Manriquez MD  Treating Investigators: NIRAV Medrano MD  Surgical Oncologist: SARAH Brown M.D. / Gerri Zhang NP  Radiation Oncologist: Javier Moulton M.D, PhD     Protocol: ECOG-ACRIN [id number]  IRB#: [id number]  Patient ID: [id number]  Treatment: Arm B - Carbo+Taxol+Nivolumab  Treatment: Arm C - Nivolumab Monotherapy     A Phase II/III Study of Dilcia-operative Nivolumab and Ipilimumab in Patients with Locoregional Esophageal and Gastroesophageal Junction Adenocarcinoma        Step 2 C1D1 CLINIC ONLY VISIT: 21Apr2023  Patient presents unaccompanied today for his C1D1 labs & MD visit. Patient queried & verbalized his consent to continue on above-mentioned study. Patient queried & continues to report mild fatigue, mild residual dysphagia, and a new tooth infection. Per Dr. Medrano, this is not considered to be an "uncontrolled intercurrent infection," as patient started Amoxicillin yesterday with much improvement per patient. Amoxicillin is prescribed TID x 10 days. Patient states that he is seeing his dentist on Monday. Patient states that he has been having difficulty with the J-tube, so he as switched to only oral foods, and has a reminder on his phone to eat every hour.       Patient completed Pre-treatment / C1D1 safety labs, VS, PE & ECOG (ECOG = 0, per Dr. Medrano) today per protocol. [facility] gave patient the stool collection supplies & was instructed to use a BM the morning prior to infusion & bring the completed sample to me when he comes for his lab work. Patient queried & verbalized his understanding.  Dr. Medrano assessed all patient's labs, VS & PE findings as either WNL or NCS, deeming patient eligible initiate C1D1 Nivolumab monotherapy on Monday, 24Apr2023. Patient's next appointments are outlined below. Patient will receive Nivolumab every Day 1 of each 28-day cycle.  Patient was encouraged to contact [facility] or Neva Medrano team with any questions or concerns & was reminded of clinic's 24/7 " emergency contact number. Patient verbalized understanding & denies any additional questions at this time.        Step 2 C1D1:  [id number] @ 1330 - STUDY LABS ONLY @ [facility]  [id number] @ 1400 - infusion (Nivolumab only)    Step 2 C2D1:  [id number] @ 1000 - Safety Labs @ [facility]  [id number] @ 1400 - [facility] Floor (hospitals on service)  [id number] @ 1430 - Infusion -- Nivolumab monotherapy      Solicited AEs -- Assessed 21Apr2023:  **Anemia - Grade 1 -- 21Apr2023  (NCS per MD)  Platelet count decreased - Grade 0   White blood cell count decreased - Grade 0  Neutrophil count decreased - Grade 0  **Lymphocyte count decreased - Grade 2   (NCS per MD)  Peripheral motor neuropathy - Grade 0   Peripheral sensory neuropathy - Grade 0   Creatinine increased - Grade 0  Hypothyroidism - Grade 0   (TSH WNL on 11Apr2023)  Diarrhea (patient baseline BM = 2 stools a day) - Grade 0 -- reports loose stools vs. diarrhea   **Fatigue - Grade 1 -- unchanged from prior  **Nausea - Grade 1  **Cough - Grade 0 -- newly reported symptom -- baseline event since starting lisinopril/HCTZ several years ago  Pneumonitis - Grade 0  **Hyperglycemia - Grade 1 -- Patient has a history of diabetes. No changes in regimen indicated.   Adrenal Insufficiency - Grade 0  *Rash-maculo-papular - Grade 0 - Buttocks -- assessed 20Jan2023, started using CeraVe yesterday (resolved 10Mar2023)  *Pruritis - Grade 0 - Buttocks    (resolved 10Mar2023)  Erythema multiforme - Grade 0  Vomiting - Grade 0  Lipase increased -- Not required per study calendar and therefore not assessed this visit.      Ongoing Non-Solicited AEs:  Insomnia, int. - Grade 1 - 06Jan2023 - ongoing (R/T steroids given during infusion)  Anorexia - Grade 1 - 09Jan2023 - ongoing (no treatment)  Dysgeusia - Grade 1 - notable since surgery one 3/14/2023           Complete Baseline Medical History, Adverse Events, and Concomitant Medications are located in patient's shadow chart

## 2023-04-21 NOTE — PROGRESS NOTES
MEDICAL ONCOLOGY - ESTABLISHED PATIENT VISIT    Reason for visit: esophageal cancer    Best Contact Phone Number(s): There are no phone numbers on file.     Cancer/Stage/TNM:    Cancer Staging   Esophageal adenocarcinoma  Staging form: Esophagus - Adenocarcinoma, AJCC 8th Edition  - Pathologic stage from 3/28/2023: Stage II (ypT3, pN0, cM0, G2) - Signed by Santana Brown Jr., MD on 3/28/2023       Oncology History   Esophageal adenocarcinoma   12/8/2022 Initial Diagnosis    Esophageal adenocarcinoma       12/21/2022 - 12/21/2022 Chemotherapy    Treatment Summary   Plan Name: OP ESOPHAGEAL PACLITAXEL CARBOPLATIN WEEKLY  Treatment Goal: Curative  Status: Inactive  Start Date:   End Date:   Provider: Miki Medrano MD  Chemotherapy: CARBOplatin (PARAPLATIN) in sodium chloride 0.9% 250 mL chemo infusion, , Intravenous, Clinic/HOD 1 time, 0 of 1 cycle  PACLitaxeL (TAXOL) 50 mg/m2 = 120 mg in sodium chloride 0.9% 250 mL chemo infusion, 50 mg/m2, Intravenous, Clinic/HOD 1 time, 0 of 1 cycle       12/29/2022 - 1/20/2023 Chemotherapy    Treatment Summary   Plan Name: [facility] [id number] ARM B CARBOPLATIN PACLITAXEL NIVOLUMAB  Treatment Goal: Curative  Status: Inactive  Start Date: 12/29/2022  End Date: 1/20/2023  Provider: Miki Medrano MD  Chemotherapy: CARBOplatin (PARAPLATIN) 215 mg in sodium chloride 0.9% 306.5 mL chemo infusion, 215 mg, Intravenous, Clinic/HOD 1 time, 4 of 5 cycles  Administration: 215 mg (12/29/2022), 230 mg (1/5/2023), 265 mg (1/13/2023), 265 mg (1/20/2023)  PACLitaxeL (TAXOL) 50 mg/m2 = 120 mg in sodium chloride 0.9% 250 mL chemo infusion, 50 mg/m2 = 120 mg, Intravenous, Clinic/HOD 1 time, 4 of 5 cycles  Dose modification: 50 mg/m2 (original dose 50 mg/m2, Cycle 4)  Administration: 120 mg (12/29/2022), 120 mg (1/5/2023), 120 mg (1/13/2023), 120 mg (1/20/2023)       12/29/2022 - 2/1/2023 Radiation Therapy    Treating physician: Dr. Javier Moulton    Course: C1 CHEST 2022    Treatment Site  Ref. ID Energy Dose/Fx (Gy) #Fx Dose Correction (Gy) Total Dose (Gy) Start Date End Date Elapsed Days   IM Esophagus [id number] 6X 1.8 23 / 23 0 41.4 12/29/2022 2/1/2023 34        3/17/2023 Surgery    Procedure: Procedure(s) (LRB):  XI ROBOTIC ESOPHAGECTOMY (N/A)  ROBOTIC JEJUNOSTOMY TUBE INSERTION (N/A)      Surgeon(s) and Role:     * Santana Brown Jr., MD - Primary     * Darian Murphy MD - Assisting     Assistance: Due to having no qualified resident during critical portions of the case, Dr. Darian Murphy acted as an assistant.     Pre-Operative Diagnosis: Esophageal adenocarcinoma, distal 1/3     Post-Operative Diagnosis: Same     Pre-Operative Variables:  Stage: T3 N0  Adjacent organ involvement: None  Chemotherapy within 90 Days: Yes  Radiation Therapy within 90 Days: Yes     Comorbidities:  Morbid obesity  Type 2 diabetes mellitus  Obstructive sleep apnea  Gout  Hyperparathyroidism  Hypertension  Severe obesity  Coronary artery disease  Heart failure with reduced ejection fraction    Procedure:  Robotic-assisted Vj three field esophagectomy  Regional mediastinal lymph node dissection  Botox injection of the pylorus  Jejunostomy tube placement     Operative Findings:                No evidence of distant intraperitoneal metastases in the chest or abdomen  Esophageal tumor located in the distal third of the esophagus, mild radiation changes appreciated.  Resection status:  R0 pending final pathology.  No gross disease remaining post dissection.  Frozen sections on proximal and distal margins negative for malignancy  100u Botox injection into the pylorus  Stapled esophagogastrostomy performed at the neck incision after confirmation of negative margins.  Jejunostomy tube placed      3/28/2023 Cancer Staged    Staging form: Esophagus - Adenocarcinoma, AJCC 8th Edition  - Pathologic stage from 3/28/2023: Stage II (ypT3, pN0, cM0, G2)       4/21/2023 -  Chemotherapy    Treatment Summary   Plan Name: [facility] [id number]  ARM C ADJUVANT NIVOLUMAB  Treatment Goal: Curative  Status: Active  Start Date: 4/21/2023 (Planned)  End Date: 9/22/2023 (Planned)  Provider: Miki Medrano MD  Chemotherapy: [No matching medication found in this treatment plan]            Interim History:     Mr. Person returns to clinic today for follow-up.  He underwent robotic esophagectomy on 3/14/23 with Dr. Brown and J tube insertion. Had L vocal cord palsy post-operatively that was treated with injection laryngoplasty on 3/18/23.    Scheduled to see dentist 4/25, has infected tooth and started on amoxicillin 4/20. No procedures planned at that time. Never had a tooth infection before, but was having dental issues prior to surgery that has not been able to be addressed. Weight 226 - > 220 lbs over past week attributed to cessation of tube feeds. Has been slowly ramping up po intake. Notes some diarrhea, nausea and taste changes. No emesis, no need for anti-emetics.     Blood pressure at home 120s/80s,     No other new complaints.      Review of Systems   Constitutional:  Positive for weight loss.   HENT:  Positive for sore throat.    Respiratory:  Negative for cough and shortness of breath.    Cardiovascular:  Negative for chest pain.   Gastrointestinal:  Positive for diarrhea. Negative for abdominal pain.   Genitourinary:  Negative for frequency.   Musculoskeletal:  Negative for back pain.   Skin:  Negative for rash.   Neurological:  Negative for headaches.   Psychiatric/Behavioral:  The patient is not nervous/anxious.      Past Medical History:   Past Medical History:   Diagnosis Date    Anticoagulant long-term use     Diabetes mellitus     Onset late 50s/early 60s    Hyperlipidemia     Hypertension     Onset late 50s/early 60s    Kidney stones     Sleep apnea     since 2006        Past Surgical History:   Past Surgical History:   Procedure Laterality Date    CORONARY ANGIOGRAPHY N/A 9/30/2020    Procedure: ANGIOGRAM, CORONARY ARTERY;  Surgeon: John  Herbert West MD;  Location: [facility] CATH LAB;  Service: Cardiology;  Laterality: N/A;    CYSTOSCOPY N/A 2/17/2022    Procedure: CYSTOSCOPY;  Surgeon: Lukasz Hughes MD;  Location: [facility] OR 1ST FLR;  Service: Urology;  Laterality: N/A;    CYSTOSCOPY W/ URETERAL STENT PLACEMENT N/A 2/25/2022    Procedure: CYSTOSCOPY, WITH URETERAL STENT INSERTION;  Surgeon: Lukasz Hughes MD;  Location: [facility] OR [facility] FLR;  Service: Urology;  Laterality: N/A;    ENDOSCOPIC ULTRASOUND OF UPPER GASTROINTESTINAL TRACT N/A 12/7/2022    Procedure: ULTRASOUND, UPPER GI TRACT, ENDOSCOPIC;  Surgeon: Darian Main MD;  Location: [facility] ENDO;  Service: Endoscopy;  Laterality: N/A;  Approval to hold Xarelto rec'd from Dr. Nick (see t/e 12/5/22)-DS    ESOPHAGOGASTRODUODENOSCOPY N/A 11/17/2022    Procedure: EGD (ESOPHAGOGASTRODUODENOSCOPY);  Surgeon: Brody Gonzales MD;  Location: [facility] (2ND FLR);  Service: Endoscopy;  Laterality: N/A;  inst via email-ok to hold Xarelto x 2 days-MS    LASER LITHOTRIPSY Left 2/25/2022    Procedure: LITHOTRIPSY, USING LASER;  Surgeon: Lukasz Hughes MD;  [facility];  Service: Urology;  Laterality: Left;    LEFT HEART CATHETERIZATION Left 9/30/2020    Procedure: Left heart cath;  Surgeon: John West MD;  Location: [facility] CATH LAB;  Service: Cardiology;  Laterality: Left;    LITHOTRIPSY      PARATHYROIDECTOMY  1/1/2-107    PYELOSCOPY Left 2/25/2022    Procedure: PYELOSCOPY;  Surgeon: Lukasz Hughes MD;  Location: [facility] OR 1ST FLR;  Service: Urology;  Laterality: Left;    ROBOT-ASSISTED SURGICAL REMOVAL OF ESOPHAGUS USING DA CARLOS XI N/A 3/14/2023    Procedure: XI ROBOTIC ESOPHAGECTOMY;  Surgeon: Santana Brown Jr., MD;  Location: [facility] OR 2ND FLR;  Service: General;  Laterality: N/A;  Abdomen, Chest and Neck    ROBOTIC JEJUNOSTOMY N/A 3/14/2023    Procedure: ROBOTIC JEJUNOSTOMY TUBE INSERTION;  Surgeon: Santana Brown Jr., MD;  Location: [facility] OR [address] [place] [address];  Service: General;   Laterality: N/A;    TRANSESOPHAGEAL ECHOCARDIOGRAPHY N/A 2022    Procedure: ECHOCARDIOGRAM, TRANSESOPHAGEAL;  Surgeon: Xander Diagnostic Provider;  Location: [facility] EP LAB;  Service: Cardiology;  Laterality: N/A;    TREATMENT OF CARDIAC ARRHYTHMIA N/A 2022    Procedure: Cardioversion or Defibrillation;  Surgeon: Iris Fisher NP;  Location: [facility] EP LAB;  Service: Cardiology;  Laterality: N/A;  afib, dccv, artie, anes, EH, 3prep    URETEROSCOPIC REMOVAL OF URETERIC CALCULUS Left 2022    Procedure: REMOVAL, CALCULUS, URETER, URETEROSCOPIC;  Surgeon: Lukasz Hughes MD;  Location: [facility] OR 1ST FLR;  Service: Urology;  Laterality: Left;    URETEROSCOPY Left 2022    Procedure: URETEROSCOPY;  Surgeon: Lukasz Hughes MD;  Location: [facility] OR 1ST FLR;  Service: Urology;  Laterality: Left;    VOCAL CORD INJECTION Left 3/17/2023    Procedure: INJECTION, VOCAL CORD, LARYNGOSCOPIC;  Surgeon: Gm Altman MD;  Location: [facility] OR 2ND FLR;  Service: ENT;  Laterality: Left;        Family History:   Family History   Problem Relation Age of Onset    Arthritis Mother     Heart disease Father 70        CABG    Arthritis Father     Diabetes Father     Kidney disease Father         had one kidney removed in early thirties    Arthritis Sister     Arthritis Sister     Hypertension Sister     Colon cancer Brother 32    Arthritis Brother     Alcohol abuse Paternal Aunt     Cancer Maternal Grandfather         throat cancer    Diabetes Paternal Grandmother     Colon polyps Paternal Grandfather     Colon cancer Paternal Grandfather     Cancer Paternal Grandfather         colon cancer at age 62        Social History:   Social History     Tobacco Use    Smoking status: Former     Packs/day: 1.00     Years: 7.00     Pack years: 7.00     Types: Cigarettes, Cigars     Start date: 1972     Quit date:      Years since quittin.9     Passive exposure: Never    Smokeless tobacco: Never    Tobacco comments:      smoking was off and on.  cumulative 7 years.  never more than three years in one stretch or more lj   Substance Use Topics    Alcohol use: Yes     Alcohol/week: 3.0 standard drinks     Types: 2 Cans of beer, 1 Shots of liquor per week     Comment: don't drink regularly.  6 to 7 monthly      I have reviewed and updated the patient's past medical, surgical, family and social histories.    Allergies:   Review of patient's allergies indicates:  No Known Allergies     Medications:   Current Outpatient Medications   Medication Sig Dispense Refill    allopurinoL (ZYLOPRIM) 100 MG tablet TAKE 1 TABLET BY MOUTH EVERY DAY 30 tablet 10    amoxicillin (AMOXIL) 500 MG Tab Take 500 mg by mouth 3 (three) times daily.      blood sugar diagnostic (ACCU-CHEK ANTONELLA PLUS TEST STRP) Strp Use to test CBG four times daily E11.65 400 strip 1    blood-glucose meter kit Checks blood sugars 1x/daily. 1 each 12    lancets (ACCU-CHEK SOFTCLIX LANCETS) Misc Use to test CBG 4 times daily E11.65 400 each 1    lisinopriL-hydrochlorothiazide (PRINZIDE,ZESTORETIC) 20-25 mg Tab TAKE 1 TABLET BY MOUTH EVERY DAY 90 tablet 3    metFORMIN 500 mg/5 mL Soln Take 1,000 mg by mouth 2 (two) times a day. 1800 mL 3    nitroGLYCERIN (NITROSTAT) 0.4 MG SL tablet Place 1 tablet (0.4 mg total) under the tongue every 5 (five) minutes as needed for Chest pain. If you need a third tablet, call 911 60 tablet 12    omeprazole (PRILOSEC) 40 MG capsule Take 1 capsule (40 mg total) by mouth once daily. 90 capsule 3    rivaroxaban (XARELTO) 20 mg Tab Take 1 tablet (20 mg total) by mouth daily with dinner or evening meal. 30 tablet 11    rosuvastatin (CRESTOR) 20 MG tablet TAKE 1 TABLET(20 MG) BY MOUTH EVERY DAY (Patient taking differently: Take 20 mg by mouth every evening.) 30 tablet 11    tamsulosin (FLOMAX) 0.4 mg Cap Take 1 capsule (0.4 mg total) by mouth once daily. (Patient taking differently: Take 0.4 mg by mouth nightly.) 30 capsule 11    testosterone (ANDROGEL)  20.25 mg/1.25 gram (1.62 %) GlPm Apply 4 pumps to shoulders daily 2 each 5    metoprolol succinate (TOPROL-XL) 25 MG 24 hr tablet Take 1 tablet (25 mg total) by mouth once daily. 30 tablet 11     No current facility-administered medications for this visit.        Physical Exam:   BP (!) 101/56 (BP Location: Left arm, Patient Position: Sitting, BP Method: Medium (Automatic))   Pulse 78   Temp 99.3 °F (37.4 °C) (Oral)   Resp 18   Ht 6' (1.829 m)   Wt 100.2 kg (220 lb 14.4 oz)   SpO2 97%   BMI 29.96 kg/m²      ECOG Performance status: 0    Physical Exam  Vitals reviewed.   Constitutional:       General: He is not in acute distress.     Appearance: Normal appearance. He is not ill-appearing, toxic-appearing or diaphoretic.   HENT:      Head: Normocephalic and atraumatic.   Eyes:      General: No scleral icterus.     Extraocular Movements: Extraocular movements intact.      Conjunctiva/sclera: Conjunctivae normal.      Pupils: Pupils are equal, round, and reactive to light.   Neck:      Comments: L neck wound well-healing  Cardiovascular:      Rate and Rhythm: Normal rate and regular rhythm.      Heart sounds: Normal heart sounds. No murmur heard.  Pulmonary:      Effort: Pulmonary effort is normal. No respiratory distress.      Breath sounds: Normal breath sounds. No wheezing or rales.   Abdominal:      General: Bowel sounds are normal. There is no distension.      Palpations: Abdomen is soft.      Tenderness: There is no abdominal tenderness.      Comments: J tube in place, clean, no oozing   Musculoskeletal:         General: No swelling.      Cervical back: Normal range of motion.   Lymphadenopathy:      Cervical: No cervical adenopathy.   Skin:     Coloration: Skin is not jaundiced.      Findings: No bruising, erythema or rash.   Neurological:      General: No focal deficit present.      Mental Status: He is alert and oriented to person, place, and time. Mental status is at baseline.      Cranial Nerves: No  cranial nerve deficit.      Motor: No weakness.      Gait: Gait normal.   Psychiatric:         Mood and Affect: Mood normal.         Behavior: Behavior normal.         Thought Content: Thought content normal.         Judgment: Judgment normal.       Labs:   No results found for this or any previous visit (from the past 48 hour(s)).     I have reviewed the pertinent labs from 3/31/23 which are notable for mild anemia.  Cr 1.1, normal LFTs.    Imagin2022 - EUS  Impression:             - Esophageal mucosal changes consistent with Paredes's esophagus.   - Partially obstructing, malignant esophageal tumor was found in the lower third of the esophagus.   - Normal stomach.   - Normal examined duodenum.   - A mass was found in the lower third of the esophagus. A tissue diagnosis was obtained prior to this exam. This is of adenocarcinoma. This was staged T3 (based on invasion into) N0 Mx by endosonographic criteria.   - No specimens collected.     3/1/22 - PET CT   Impression:     1.  Decreased uptake in persistent distal esophageal thickening suggestive of partial response to therapy.  Previous non hypermetabolic gastrohepatic and perigastric lymph nodes are stable to slightly decreased in size.     2.  No new FDG avid lesions to suggest mixed response to therapy.     3.  Incidental new hypermetabolic gluteal cutaneous thickening, likely benign.  Recommend correlation with history and physical examination.    Path:   3/14/23:  Final Pathologic Diagnosis 1. LYMPH NODE, LEVEL 7, EXCISION:   One benign lymph node (0/1)   2. TOTAL THORACIC ESOPHAGECTOMY AND PROXIMAL STOMACH:   Adenocarcinoma, moderately differentiated, 3.0 cm   Margins are negative   Tumor invades adventitia   Extensive residual cancer with no evident tumor regression (poor or no   response, score 3)   Eleven benign lymph nodes (0/11)   3. RESIDUAL CONDUIT, EXCISION:   Negative for malignancy   SYNOPTIC REPORT   Procedure - Esophageal gastrectomy    Tumor site - GE Junction   Relationship of tumor to esophagogastric junction - Lesion is central at GE   junction   Tumor size - 3.0 x 3.1cm   Histologic type - Adenocarcinoma   Histologic grade - Moderately differentiated   Microscopic tumor extension - Tumor invades adventitia   Margins - All margins of resection are uninvolved, proximal, distal and   adventitial margins are negative   Treatment effect - Extensive residual cancer with no evident tumor regression   (poor or no response, score 3)   Lymphovascular invasion - Not identified   Pathologic staging - yp T3 N0 Mx   Lymph nodes examined - 12   Lymph nodes involved - 0   Additional pathologic findings - Intestinal metaplasia present   MMR-IHC has been performed on previous biopsy ([id number]) and shows all 4   antibodies intact          Assessment:       1. Esophageal adenocarcinoma    2. Vocal cord paralysis    3. Hypertension associated with diabetes    4. Hyperlipidemia associated with type 2 diabetes mellitus    5. Type 2 diabetes mellitus with stage 3 chronic kidney disease, without long-term current use of insulin, unspecified whether stage 3a or 3b CKD    6. CKD stage 3 due to type 2 diabetes mellitus    7. Coronary artery disease involving native coronary artery of native heart without angina pectoris    8. Paroxysmal atrial fibrillation    9. HFrEF (heart failure with reduced ejection fraction)                  Plan:     # Esophageal adenocarcinoma   Mr. Person is a pleasant 67 year old male who presents to our clinic for management of esophageal cancer. He initially presented with dysphagia, and further workup confirmed a stage II adenocarcinoma, pMMR. Tumor staged T3N0Mx by endosonographic criteria, JEVON. CT CAP on 12/14/22 confirmed no evidence of metastatic disease.    Previously conversation regarding his diagnosis and treatment options.  Recommended perioperative chemo/radiation per the CROSS trial. Discussed chemoradiation for ~5 weeks with  5 doses of weekly carboplatin and taxol administered. Plan to obtain restaging scans 4-5 weeks after radiation completed.     He was a candidate for a cooperative group trial assessing perioperative immunotherapy treatment. He consented for this study - ECOG-ACRIN [id number]: A Phase II/III Study of Dilcia-operative Nivolumab and Ipilimumab in Patients with Locoregional Esophageal and Gastroesophageal Junction Adenocarcinoma    Previously met with Dr. Santana Brown who agreed he was a surgical candidate.  Previously met with Dr. Javier Moulton who will be treating him with radiation.    Began cycle 1 carboplatin/paclitaxel/nivolumab on trial on 12/29/22.  Received cycle 4 of weekly chemotherapy on trial on 1/20/23.   We held chemotherapy for week 5 because of thrombocytopenia per protocol.    Completed radiation on 2/1/23.    Restaging PET/CT personally reviewed on 3/1/23 shows interval decreased FDG avidity in esophageal tumor, no new sites of disease.  CT CAP 3/2/23 shows stable esophageal thickening.    Underwent robotic esophagectomy on 3/14/23 with Dr. Brown.  Pathology showed negative margins, ypT3 N0 tumor with 0 of 12 lymph nodes involved.  No treatment effect was noted on the tumor tissue.    Discussed that per the [id number] protocol will proceed with randomization to adjuvant nivolumab +/- ipilimumab.  Patient randomized to receive adjuvant Nivolumab, will start cycle 1 / 6 months of therapy 4/25.    Cessation of tube feeds, tolerating po.   Will reach out to pathology for PDL1 level in path.     # Vocal cord paralysis  Post-op. S/p injection by ENT.    # HTN, HLD, DM, CKD  Following with PCP Dr. Wilson and nephrologist Dr. Oconnell.   /56, enrolled in chemo care companion.     Continue medical management, will reach out to cardiology.      # CAD, A fib, CHF  Following with cardiologist Dr. Nick & EP Dr. Phillip.   Currently asymptomatic.   On Xarelto.   Continue medical management.     # Dental  Infection  - started amoxicillin 4/20, dental visit 4/25  - no changes to IO schedul    Follow up: 3 weeks per research.    Patient discussed with attending physician, Dr. Marcela Overton, PGY-VI  Hematology/Oncology Fellow  Answers submitted by the patient for this visit:  Review of Systems Questionnaire (Submitted on 4/14/2023)  appetite change : No  unexpected weight change: No  mouth sores: No  visual disturbance: No  adenopathy: No

## 2023-04-21 NOTE — PROGRESS NOTES
Electrophysiology Clinic Note    Reason for follow-up patient visit: Ongoing evaluation and recommendations regarding paroxysmal atrial fibrillation, s/p successful cardioversion on 4/7/2022.     PRESENTING HISTORY:     History of Present Illness:  Mr. Lukasz Person is a randa 68-year-old gentleman who returns to clinic today for ongoing evaluation and recommendations regarding paroxysmal atrial fibrillation s/p successful cardioversion on 4/7/2022. He has a past medical history significant for a paroxysmal atrial fibrillation, successful cardioversion on 4/7/2022, recovered mildly-reduced systolic function with LVEF 45% with recovery following cardioversion - LVEF now 60%, non-obstructive coronary artery disease, esophageal adenocarcinoma s/p chemotherapy with carboplatin/paclitaxel, radiation therapy, and a robotic surgical esophagectomy with Dr. Brown on 3/17/2023, resultant left vocal cord palsy treated with injection laryngoplasty on 3/18/23, hypertension, hyperlipidemia, type II diabetes mellitus, CKD stage III, aortic sclerosis without stenosis, obesity, obstructive sleep apnea, hyperparathyroidism s/p parathyroidectomy, a history of pyelonephritis with lithotripsy, a history of tobacco use, a recent tooth infection, and overweight BMI.    He is followed in general cardiology clinic with Dr. Nick and GLENDA Johnson. He developed atrial fibrillation in setting of an acute episode of pyelonephritis, requiring admission from 3/3-6/2022. At that admission, he was noted to have fever and chills, with a pelvic CT renal study revealing left perinephric fat stranding indicating pyelonephritis. He remained persistently febrile up to 103 F despite initiating IV rocephin and Tylenol for antipyretic therapy. Urology advised broadened antibiotic coverage, in addition to antifungal coverage, with recommendations to leave his recently placed uretal stent in place until after his acute event resolved. He was discharged home  with ciprofloxacin and close urology follow-up. During this admission, he was noted to have periods of atrial fibrillation with RVR, with a newly discovered decline in systolic function, with LVEF 45%. He was initiated on rate control with metoprolol succinate 50mg po daily in addition to rivaroxaban 20mg po daily. He denies any adverse bleeding events while on this agent. He underwent successful cardioversion on 4/7/2022, with initiation of amiodarone for antiarrhythmic therapy. His follow-up echocardiogram in sinus rhythm revealed recovery of his systolic function, LVEF 60%. He discontinued amiodarone approximately three months following his cardioversion with no recurrent atrial fibrillation. He was subsequently diagnosed with esophageal adenocarcinoma, and underwent treatment with chemotherapy with carboplatin/paclitaxel, radiation therapy, and a robotic surgical esophagectomy with Dr. Brown on 3/17/2023, with resultant left vocal cord palsy treated with injection laryngoplasty on 3/18/23. He has continued to follow closely with hematology-oncology with Dr. Medrano, and is planned to start Optiva x 6 months with a dose once every two weeks. During this surgery and all of his chemotherapy/radiation, he has not had any subsequent events of atrial fibrillation. His metoprolol succinate was decreased to 25mg po daily in the setting of periods of relative hypotension. He was instructed to monitor his home blood pressure, and in the event his systolic readings were low, to hold his dose of lisinopril/HCTZ. Since adopting this regimen, he notes that he has not had any recordings of SBP less than 100mmHg and denies any symptoms. He was instructed to discontinue use of his CPAP machine following his recent esophagectomy. He is able to tell when he is in atrial fibrillation, with reported symptoms of palpitations, generalized fatigue, worsened shortness of breath, and exercise intolerance. He denies any of these  "recurrent symptoms. He reports that he has a tooth infection for which he has been stated on amoxicillin. He notes that his weights have slightly decreased, and notes that he has been using his J-tube less frequently and attempting to increase his oral intake. He reports mild diarrhea, nausea, and taste changes, but denies any emesis.    In discussion with Mr. Person today, he tells me that he is feeling overall "pretty good considering" following restoration and maintenance of sinus rhythm and following his recent esophageal resection with chemo and radiation therapy. He denies any episodes of dizziness, lightheadedness, syncope/presyncope, chest pain or chest discomfort, vomiting, orthopnea, lower extremity edema, or PND. He is no longer experiencing palpitations, with improvement in his fatigue and return to his baseline level of physical exercise capacity. He reports baseline shortness of breath and dyspnea with exertion that he feels remain at his baseline. He can climb more than one flight of stairs prior to needing to take a break, but has not yet resumed full physical activity following his recent surgery.       Review of Systems:  Review of Systems   Constitutional:  Negative for activity change.   HENT:  Positive for dental problem, sore throat and voice change. Negative for nasal congestion, nosebleeds, postnasal drip, rhinorrhea, sinus pressure/congestion and sneezing.         Recent tooth infection. Left vocal cord palsy.    Respiratory:  Positive for shortness of breath. Negative for apnea, cough, chest tightness and wheezing.    Cardiovascular:  Negative for chest pain, palpitations and leg swelling.   Gastrointestinal:  Positive for diarrhea and nausea. Negative for abdominal distention, abdominal pain, blood in stool, change in bowel habit, constipation, vomiting and change in bowel habit.   Genitourinary:  Negative for dysuria and hematuria.   Musculoskeletal:  Positive for arthralgias and back pain. " Negative for gait problem.   Neurological:  Negative for dizziness, seizures, syncope, weakness, light-headedness, headaches, coordination difficulties and coordination difficulties.      PAST HISTORY:     Past Medical History:   Diagnosis Date    Anticoagulant long-term use     Diabetes mellitus     Onset late 50s/early 60s    Hyperlipidemia     Hypertension     Onset late 50s/early 60s    Kidney stones     Sleep apnea     since 2006       Past Surgical History:   Procedure Laterality Date    CORONARY ANGIOGRAPHY N/A 9/30/2020    Procedure: ANGIOGRAM, CORONARY ARTERY;  Surgeon: John West MD;  Location: [facility] CATH LAB;  Service: Cardiology;  Laterality: N/A;    CYSTOSCOPY N/A 2/17/2022    Procedure: CYSTOSCOPY;  Surgeon: Lukasz Hughes MD;  [facility];  Service: Urology;  Laterality: N/A;    CYSTOSCOPY W/ URETERAL STENT PLACEMENT N/A 2/25/2022    Procedure: CYSTOSCOPY, WITH URETERAL STENT INSERTION;  Surgeon: Lukasz Hughes MD;  Location: [facility] OR [address] [place] [address];  Service: Urology;  Laterality: N/A;    ENDOSCOPIC ULTRASOUND OF UPPER GASTROINTESTINAL TRACT N/A 12/7/2022    Procedure: ULTRASOUND, UPPER GI TRACT, ENDOSCOPIC;  Surgeon: Darian Main MD;  Location: [facility] ENDO;  Service: Endoscopy;  Laterality: N/A;  Approval to hold Xarelto rec'd from Dr. Nick (see t/e 12/5/22)-DS    ESOPHAGOGASTRODUODENOSCOPY N/A 11/17/2022    Procedure: EGD (ESOPHAGOGASTRODUODENOSCOPY);  Surgeon: Brody Gonzales MD;  Location: [facility] (2ND FLR);  Service: Endoscopy;  Laterality: N/A;  inst via email-ok to hold Xarelto x 2 days-MS    LASER LITHOTRIPSY Left 2/25/2022    Procedure: LITHOTRIPSY, USING LASER;  Surgeon: Lukasz Hughes MD;  Location: [facility] OR [address] [place] [address];  Service: Urology;  Laterality: Left;    LEFT HEART CATHETERIZATION Left 9/30/2020    Procedure: Left heart cath;  Surgeon: John West MD;  Location: [facility] CATH LAB;  Service: Cardiology;  Laterality: Left;    LITHOTRIPSY       PARATHYROIDECTOMY  1/1/2-107    PYELOSCOPY Left 2/25/2022    Procedure: PYELOSCOPY;  Surgeon: Luaksz Hughes MD;  [facility];  Service: Urology;  Laterality: Left;    ROBOT-ASSISTED SURGICAL REMOVAL OF ESOPHAGUS USING DA CARLOS XI N/A 3/14/2023    Procedure: XI ROBOTIC ESOPHAGECTOMY;  Surgeon: Santana Brown Jr., MD;  Location: [facility] OR 2ND FLR;  Service: General;  Laterality: N/A;  Abdomen, Chest and Neck    ROBOTIC JEJUNOSTOMY N/A 3/14/2023    Procedure: ROBOTIC JEJUNOSTOMY TUBE INSERTION;  Surgeon: Santana Brown Jr., MD;  Location: [facility] OR 2ND FLR;  Service: General;  Laterality: N/A;    TRANSESOPHAGEAL ECHOCARDIOGRAPHY N/A 4/7/2022    Procedure: ECHOCARDIOGRAM, TRANSESOPHAGEAL;  Surgeon: Xander Diagnostic Provider;  Location: [facility] EP LAB;  Service: Cardiology;  Laterality: N/A;    TREATMENT OF CARDIAC ARRHYTHMIA N/A 4/7/2022    Procedure: Cardioversion or Defibrillation;  Surgeon: Iris Fisher NP;  Location: [facility] EP LAB;  Service: Cardiology;  Laterality: N/A;  afib, dccv, artie, anes, EH, 3prep    URETEROSCOPIC REMOVAL OF URETERIC CALCULUS Left 2/25/2022    Procedure: REMOVAL, CALCULUS, URETER, URETEROSCOPIC;  Surgeon: Lukasz Hughes MD;  [facility];  Service: Urology;  Laterality: Left;    URETEROSCOPY Left 2/25/2022    Procedure: URETEROSCOPY;  Surgeon: Lukasz Hughes MD;  [facility];  Service: Urology;  Laterality: Left;    VOCAL CORD INJECTION Left 3/17/2023    Procedure: INJECTION, VOCAL CORD, LARYNGOSCOPIC;  Surgeon: Gm Altman MD;  Location: [facility] OR 2ND FLR;  Service: ENT;  Laterality: Left;       Family History:  Family History   Problem Relation Age of Onset    Arthritis Mother     Heart disease Father 70        CABG    Arthritis Father     Diabetes Father     Kidney disease Father         had one kidney removed in early thirties    Arthritis Sister     Arthritis Sister     Hypertension Sister     Colon cancer Brother 32    Arthritis  Brother     Alcohol abuse Paternal Aunt     Cancer Maternal Grandfather         throat cancer    Diabetes Paternal Grandmother     Colon polyps Paternal Grandfather     Colon cancer Paternal Grandfather     Cancer Paternal Grandfather         colon cancer at age 62       Social History:  He  reports that he quit smoking about 27 years ago. His smoking use included cigarettes and cigars. He started smoking about 50 years ago. He has a 7.00 pack-year smoking history. He has never been exposed to tobacco smoke. He has never used smokeless tobacco. He reports current alcohol use of about 3.0 standard drinks per week. He reports that he does not currently use drugs after having used the following drugs: Marijuana.      MEDICATIONS & ALLERGIES:     Review of patient's allergies indicates:  No Known Allergies    Current Outpatient Medications on File Prior to Visit   Medication Sig Dispense Refill    allopurinoL (ZYLOPRIM) 100 MG tablet TAKE 1 TABLET BY MOUTH EVERY DAY 30 tablet 10    blood sugar diagnostic (ACCU-CHEK ANTONELLA PLUS TEST STRP) Strp Use to test CBG four times daily E11.65 400 strip 1    blood-glucose meter kit Checks blood sugars 1x/daily. 1 each 12    lancets (ACCU-CHEK SOFTCLIX LANCETS) Misc Use to test CBG 4 times daily E11.65 400 each 1    lisinopriL-hydrochlorothiazide (PRINZIDE,ZESTORETIC) 20-25 mg Tab TAKE 1 TABLET BY MOUTH EVERY DAY 90 tablet 3    metFORMIN 500 mg/5 mL Soln Take 1,000 mg by mouth 2 (two) times a day. 1800 mL 3    metoprolol succinate (TOPROL-XL) 25 MG 24 hr tablet Take 1 tablet (25 mg total) by mouth once daily. 30 tablet 11    nitroGLYCERIN (NITROSTAT) 0.4 MG SL tablet Place 1 tablet (0.4 mg total) under the tongue every 5 (five) minutes as needed for Chest pain. If you need a third tablet, call 911 60 tablet 12    omeprazole (PRILOSEC) 40 MG capsule Take 1 capsule (40 mg total) by mouth once daily. 90 capsule 3    rivaroxaban (XARELTO) 20 mg Tab Take 1 tablet (20 mg total) by mouth  "daily with dinner or evening meal. 30 tablet 11    rosuvastatin (CRESTOR) 20 MG tablet TAKE 1 TABLET(20 MG) BY MOUTH EVERY DAY (Patient taking differently: Take 20 mg by mouth every evening.) 30 tablet 11    tamsulosin (FLOMAX) 0.4 mg Cap Take 1 capsule (0.4 mg total) by mouth once daily. (Patient taking differently: Take 0.4 mg by mouth nightly.) 30 capsule 11    testosterone (ANDROGEL) 20.25 mg/1.25 gram (1.62 %) GlPm Apply 4 pumps to shoulders daily 2 each 5     No current facility-administered medications on file prior to visit.        OBJECTIVE:     Vital Signs:  BP (!) 112/58   Pulse 71   Ht 6' 1" (1.854 m)   Wt 100.5 kg (221 lb 9 oz)   BMI 29.23 kg/m²     Physical Exam:  Physical Exam  Constitutional:       General: He is not in acute distress.     Appearance: Normal appearance. He is not ill-appearing or diaphoretic.      Comments: Well-appearing elderly man in NAD.   HENT:      Head: Normocephalic and atraumatic.      Comments: He reports a recent tooth infection and continues to follow closely with his dentist.      Nose: Nose normal.      Mouth/Throat:      Mouth: Mucous membranes are moist.      Pharynx: Oropharynx is clear.   Eyes:      Pupils: Pupils are equal, round, and reactive to light.   Cardiovascular:      Rate and Rhythm: Normal rate and regular rhythm.      Pulses: Normal pulses.      Heart sounds: Murmur heard.     No friction rub. No gallop.      Comments: II/VI ESVIN best appreciated at the RUSB.   Pulmonary:      Effort: Pulmonary effort is normal. No respiratory distress.      Breath sounds: Normal breath sounds. No wheezing, rhonchi or rales.   Chest:      Chest wall: No tenderness.   Abdominal:      General: There is no distension.      Palpations: Abdomen is soft.      Tenderness: There is no abdominal tenderness.      Comments: J-tube in place. Well-healed trochar scars from his recent robotic esophagectomy procedure.    Musculoskeletal:         General: No swelling or tenderness. "      Cervical back: Normal range of motion.      Right lower leg: No edema.      Left lower leg: No edema.   Skin:     General: Skin is warm and dry.      Findings: No erythema, lesion or rash.   Neurological:      General: No focal deficit present.      Mental Status: He is alert and oriented to person, place, and time. Mental status is at baseline.      Motor: No weakness.      Gait: Gait normal.   Psychiatric:         Mood and Affect: Mood normal.         Behavior: Behavior normal.      Laboratory Data:  Lab Results   Component Value Date    WBC 4.66 04/11/2023    HGB 11.2 (L) 04/11/2023    HCT 34.1 (L) 04/11/2023    MCV 93 04/11/2023     04/11/2023     Lab Results   Component Value Date     (H) 04/11/2023     (L) 04/11/2023    K 4.8 04/11/2023    CL 93 (L) 04/11/2023    CO2 27 04/11/2023    BUN 20 04/11/2023    CREATININE 1.1 04/11/2023    CALCIUM 9.9 04/11/2023    MG 2.0 04/11/2023     Lab Results   Component Value Date    INR 1.1 04/01/2022    INR 1.1 03/03/2022       Pertinent Cardiac Data:  ECG: Normal sinus rhythm with rate of 71 bpm,  ms, QRS 92 ms, QT/QTc 386/419 ms, nonspecific STT changes.     Pharmacologic Nuclear Cardiac Stress Test - PET - 9/14/2020:    The relative PET images are normal showing no clinically significant regional resting or stress induced perfusion defects.    Whole heart absolute myocardial perfusion (cc/min/g) averaged 0.59 cc/min/g at rest, which is reduced, 1.73 cc/min/g at stress, which is mildly reduced, and 2.94  CFR, which is normal.    There is mild thinning of the mid and distal inferior wall with preserved flow capacity.  These findings are consistent with non-obstructive RCA disease or possible RCA  with excellent collaterals.    Gated perfusion images showed an ejection fraction of 57% at rest and 79% during stress. Normal ejection fraction is greater than 51%.    Wall motion was normal at rest and during stress.    LV cavity size is normal  at rest and stress.    The EKG portion of this study is negative for ischemia.    Arrhythmias during stress: occasional PVCs.    The patient reported no chest pain during the stress test.    There are no prior studies for comparison.    Coronary Angiogram - 9/30/2020:  LVEDP (Pre): 10  50% PDA stenosis iFR=0.98  Estimated blood loss: <50 mL    Resting 2D Transthoracic Echocardiogram - 3/18/2022:  Heart rate varied b/w 94 and 132 bpm.  The left ventricle is normal in size with concentric remodeling and mildly decreased systolic function.  The estimated ejection fraction is 45%.  A diastolic pattern consistent with atrial fibrillation observed.  Normal right ventricular size with normal right ventricular systolic function.  Aortic sclerosis w/o stenosis. Aortic valve area is 2.39 cm2; peak velocity is 1.97 m/s; mean gradient is 9 mmHg.  The estimated PA systolic pressure is 23 mmHg.  Normal central venous pressure (3 mmHg).    KARSON - 4/7/2022:  Irregular rhythm was present during the study. Tachycardia was present during the study. There  No thrombus is present in the appendage. CARYE occluder is absent. Normal appendage velocities.  The estimated ejection fraction is 40%. Significant beat to beat variability.  The left ventricle is normal in size with mildly decreased systolic function.  Normal right ventricular size with normal right ventricular systolic function.  Aortic valve sclerosis with mildly restricted right leaflet without any significant stenosis.  Grade 3 plaque present in the transverse aorta and descending aorta.    DC-Cardioversion - 4/7/2022:  Cardioversion was successfully performed with restoration of normal sinus rhythm.    Resting 2D Transthoracic Echocardiogram - 5/26/2022:  Mild left atrial enlargement.  The left ventricle is normal in size with normal systolic function.  The estimated ejection fraction is 60%.  Normal left ventricular diastolic function.  Normal right ventricular size with normal  right ventricular systolic function.  Aortic sclerosis w/o stenosis.  The estimated PA systolic pressure is 31 mmHg.  Normal central venous pressure (3 mmHg).    Abdominal Ultrasound - 6/2/2022:  No abdominal aortic aneurysm.    Surgical Esophagectomy and J-tube Placement - 3/14/2023:  Robotic-assisted Vj three field esophagectomy  Regional mediastinal lymph node dissection  Botox injection of the pylorus  Jejunostomy tube placement      ASSESSMENT & PLAN:   Mr. Lukasz Person is a randa 68-year-old gentleman who returns to clinic today for ongoing evaluation and recommendations regarding paroxysmal atrial fibrillation s/p successful cardioversion on 4/7/2022. He has a past medical history significant for a paroxysmal atrial fibrillation, successful cardioversion on 4/7/2022, recovered mildly-reduced systolic function with LVEF 45% with recovery following cardioversion - LVEF now 60%, non-obstructive coronary artery disease, esophageal adenocarcinoma s/p chemotherapy with carboplatin/paclitaxel, radiation therapy, and a robotic surgical esophagectomy with Dr. Brown on 3/17/2023, resultant left vocal cord palsy treated with injection laryngoplasty on 3/18/23, hypertension, hyperlipidemia, type II diabetes mellitus, CKD stage III, aortic sclerosis without stenosis, obesity, obstructive sleep apnea, hyperparathyroidism s/p parathyroidectomy, a history of pyelonephritis with lithotripsy, a history of tobacco use, a recent tooth infection, and overweight BMI. He has remained in sinus rhythm following his prior cardioversion.    - We continue to discuss the pathophysiology of atrial fibrillation; specifically, we discussed paroxysmal atrial fibrillation and the concept that some patients may experience paroxysms of atrial fibrillation interrupting periods of sinus rhythm. We discussed that even a relatively low burden of atrial fibrillation continues to have an increased risk of CVA. Lastly, we discussed that in  some cases atrial fibrillation may be triggered secondary to an underlying acute illness or infection. We discussed that in the setting of his recent surgery, chemotherapy, and radiation that he has not had any recurrent events of atrial fibrillation.   - He has remained in sinus rhythm following his prior cardioversion, and has been successfully discontinued from amiodarone therapy approximately three months following his cardioversion. His systolic function has returned to normal, LVEF 60%, following rhythm restoration and maintenance. He has no history of underlying obstructive coronary artery disease, but previously experienced an episode of pyelonephritis with CKD stage III. In the event future short-term AAD is required, amiodarone may be his best option given his history of mildly reduced systolic function and history of CKD.  - He is presently maintained on rate control with metoprolol succinate 25mg po daily - we will plan to continue this agent.   - He will continue to closely monitor his home blood pressure recordings and will continue to hold his lisinopril-HCTZ tablet in the event his systolic blood pressure is measured less than 100mmHg or in the event he has symptoms of orthostatic hypotension.   - His [id number] is 4 (CHF, HTN, T2DM, male gender, age 65-74), portending an annual adjusted risk of CVA of 4%. We discussed the relative increased risk of CVA and the need to stay on oral anticoagulation. He remains on uninterrupted rivaroxaban 20mg po daily with dinner.    - We discussed the possibility of catheter ablation with pulmonary vein isolation should he continue to have symptoms and AF despite AAD therapy. He voices understanding, although he would like to continue rate control with ongoing monitoring at this time.   - Possible underlying drivers of atrial fibrillation were addressed at this appointment, including recommendations for maintenance of a health BMI - now a class I recommendation.  Review of laboratory data reveals acceptable TSH of 1.318. We discussed that his recent surgery, chemotherapy, and radiation may trigger future events of atrial fibrillation. We discussed the role that obstructive sleep apnea may play in atrial fibrillation, although he is not presently able to use his CPAP machine following his recent esophagectomy.       This patient will return to clinic with me in six months with continued hematology/oncology, PCP, and general cardiology appointments in the interim. All questions and concerns were addressed at this encounter.     Signing Physician:       PHIL Lin MD  Electrophysiology Attending

## 2023-04-22 ENCOUNTER — TELEPHONE (OUTPATIENT)
Dept: CARDIOLOGY | Facility: CLINIC | Age: 68
End: 2023-04-22
Payer: MEDICARE

## 2023-04-22 ENCOUNTER — PATIENT MESSAGE (OUTPATIENT)
Dept: ADMINISTRATIVE | Facility: OTHER | Age: 68
End: 2023-04-22
Payer: MEDICARE

## 2023-04-22 DIAGNOSIS — I15.2 HYPERTENSION ASSOCIATED WITH DIABETES: Primary | Chronic | ICD-10-CM

## 2023-04-22 DIAGNOSIS — E11.59 HYPERTENSION ASSOCIATED WITH DIABETES: Primary | Chronic | ICD-10-CM

## 2023-04-22 RX ORDER — LISINOPRIL AND HYDROCHLOROTHIAZIDE 10; 12.5 MG/1; MG/1
1 TABLET ORAL DAILY
Qty: 90 TABLET | Refills: 3 | Status: SHIPPED | OUTPATIENT
Start: 2023-04-22 | End: 2023-07-17

## 2023-04-22 NOTE — TELEPHONE ENCOUNTER
----- Message from Paty Overton MD sent at 4/21/2023  9:35 AM CDT -----  Regarding: /56  Hi Neva Lin and Park - I saw Mr. Person today with Dr. Medrano in oncology clinic, his BP has downtrended to 101/56 which we suspect is due to weight loss after surgery and radiation for esophageal cancer. He is enrolled in our chemo care companion and endorses SBP down to 90s, but is asymptomatic.     Since he is seeing Dr. Lin today we did not make adjustments to antihypertensives but are happy to make any changes recommended.     Thanks,  Paty

## 2023-04-23 ENCOUNTER — PATIENT MESSAGE (OUTPATIENT)
Dept: ADMINISTRATIVE | Facility: OTHER | Age: 68
End: 2023-04-23
Payer: MEDICARE

## 2023-04-24 ENCOUNTER — PATIENT MESSAGE (OUTPATIENT)
Dept: ADMINISTRATIVE | Facility: OTHER | Age: 68
End: 2023-04-24
Payer: MEDICARE

## 2023-04-24 ENCOUNTER — TELEPHONE (OUTPATIENT)
Dept: CARDIOLOGY | Facility: CLINIC | Age: 68
End: 2023-04-24
Payer: MEDICARE

## 2023-04-24 ENCOUNTER — INFUSION (OUTPATIENT)
Dept: INFUSION THERAPY | Facility: HOSPITAL | Age: 68
End: 2023-04-24
Payer: MEDICARE

## 2023-04-24 VITALS
HEART RATE: 66 BPM | OXYGEN SATURATION: 100 % | DIASTOLIC BLOOD PRESSURE: 58 MMHG | SYSTOLIC BLOOD PRESSURE: 120 MMHG | RESPIRATION RATE: 18 BRPM

## 2023-04-24 DIAGNOSIS — C15.9 ESOPHAGEAL ADENOCARCINOMA: Primary | ICD-10-CM

## 2023-04-24 PROCEDURE — 96361 HYDRATE IV INFUSION ADD-ON: CPT | Mod: Q1

## 2023-04-24 PROCEDURE — 96375 TX/PRO/DX INJ NEW DRUG ADDON: CPT

## 2023-04-24 PROCEDURE — 96360 HYDRATION IV INFUSION INIT: CPT | Mod: Q1

## 2023-04-24 PROCEDURE — 63600175 PHARM REV CODE 636 W HCPCS: Performed by: INTERNAL MEDICINE

## 2023-04-24 PROCEDURE — 96374 THER/PROPH/DIAG INJ IV PUSH: CPT | Mod: Q1

## 2023-04-24 PROCEDURE — A4216 STERILE WATER/SALINE, 10 ML: HCPCS | Performed by: INTERNAL MEDICINE

## 2023-04-24 PROCEDURE — 25000003 PHARM REV CODE 250: Performed by: INTERNAL MEDICINE

## 2023-04-24 RX ORDER — HEPARIN 100 UNIT/ML
500 SYRINGE INTRAVENOUS
Status: DISCONTINUED | OUTPATIENT
Start: 2023-04-24 | End: 2023-04-24 | Stop reason: HOSPADM

## 2023-04-24 RX ORDER — ONDANSETRON 2 MG/ML
8 INJECTION INTRAMUSCULAR; INTRAVENOUS
Status: COMPLETED | OUTPATIENT
Start: 2023-04-24 | End: 2023-04-24

## 2023-04-24 RX ORDER — SODIUM CHLORIDE 0.9 % (FLUSH) 0.9 %
10 SYRINGE (ML) INJECTION
Status: DISCONTINUED | OUTPATIENT
Start: 2023-04-24 | End: 2023-04-24 | Stop reason: HOSPADM

## 2023-04-24 RX ADMIN — SODIUM CHLORIDE 1000 ML: 9 INJECTION, SOLUTION INTRAVENOUS at 02:04

## 2023-04-24 RX ADMIN — Medication 10 ML: at 03:04

## 2023-04-24 RX ADMIN — ONDANSETRON 8 MG: 2 INJECTION INTRAMUSCULAR; INTRAVENOUS at 02:04

## 2023-04-24 RX ADMIN — HEPARIN 500 UNITS: 100 SYRINGE at 03:04

## 2023-04-24 NOTE — PLAN OF CARE
1420  Patient seated in chair, VSS, assessment done.  Port accessed without issue, flushed, blood return noted.  Started Liter of NS bolus as ordered by MD. REYES for safety

## 2023-04-24 NOTE — TELEPHONE ENCOUNTER
----- Message from Pallavi Nick MD sent at 4/22/2023  2:50 PM CDT -----  Regarding: FW: /56  Please tell pt to decrease lisinopril w HCTZ to 1/2 a tab a da. Let the pt know that I sent a prescription for the lower dose. Thanks  ----- Message -----  From: Paty Overton MD  Sent: 4/21/2023   9:37 AM CDT  To: Pallavi Nick MD, #  Subject: /56                                        Hi Neva Lin and Park - I saw Mr. Person today with Dr. Medrano in oncology clinic, his BP has downtrended to 101/56 which we suspect is due to weight loss after surgery and radiation for esophageal cancer. He is enrolled in our chemo care companion and endorses SBP down to 90s, but is asymptomatic.     Since he is seeing Dr. Lin today we did not make adjustments to antihypertensives but are happy to make any changes recommended.     Thanks,  Paty

## 2023-04-24 NOTE — PLAN OF CARE
1552  Patient completed 1 Liter NS and zofran IVP, tolerated well.  Port de accessed without issue, flushed, blood return noted, heparin locked.  RTC 5/22/23  Patient ambulated off floor independently, NAD

## 2023-04-25 ENCOUNTER — PATIENT MESSAGE (OUTPATIENT)
Dept: ADMINISTRATIVE | Facility: OTHER | Age: 68
End: 2023-04-25
Payer: MEDICARE

## 2023-04-25 ENCOUNTER — PATIENT MESSAGE (OUTPATIENT)
Dept: HEMATOLOGY/ONCOLOGY | Facility: CLINIC | Age: 68
End: 2023-04-25
Payer: MEDICARE

## 2023-04-25 ENCOUNTER — PATIENT MESSAGE (OUTPATIENT)
Dept: SURGERY | Facility: CLINIC | Age: 68
End: 2023-04-25
Payer: MEDICARE

## 2023-04-25 ENCOUNTER — PATIENT MESSAGE (OUTPATIENT)
Dept: CARDIOLOGY | Facility: CLINIC | Age: 68
End: 2023-04-25
Payer: MEDICARE

## 2023-04-25 ENCOUNTER — RESEARCH ENCOUNTER (OUTPATIENT)
Dept: RESEARCH | Facility: HOSPITAL | Age: 68
End: 2023-04-25
Payer: MEDICARE

## 2023-04-25 DIAGNOSIS — C15.9 ESOPHAGEAL ADENOCARCINOMA: Primary | ICD-10-CM

## 2023-04-25 DIAGNOSIS — Z00.6 CLINICAL TRIAL PARTICIPANT: ICD-10-CM

## 2023-04-25 PROCEDURE — 88360 TUMOR IMMUNOHISTOCHEM/MANUAL: CPT | Performed by: PATHOLOGY

## 2023-04-25 NOTE — PROGRESS NOTES
: URIEL Manriquez MD  Treating Investigators: NIRAV Medrano MD  Surgical Oncologist: SARAH Brown M.D. 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 Patient queried & verbalized his consent to continue on above-mentioned study. Patient queried & reports worsening fatigue, generalized weakness, dizziness, and nausea that started roughly 3 days ago. CRC observed the patient this morning & he appeared slightly pale, skin was slightly clammy to the touch, and fatigue apparent on observation. Patient reports that he is hydrating well, usually getting very close to 64 oz per day, and is tolerating oral foods well enough & does not want to re-start his J-tube feedings at this time. He reports having breakfast this morning after getting dizzy while in the shower (had to sit down while in the shower & had to rest afterwards), and eating half of a tune sandwich before coming in today. He states that his dental pain has improved with the Amoxicillin & is seeing his dentist tomorrow.   Patient queried & denies any changes to his medications.    Patient completed Pre-treatment / C1D1 safety labs, VS, PE & ECOG (ECOG = 0, per Dr. Medrano) on 21Apr2021. Patient was given stool sample collection supplies that day and returned his sample today, with a collection date and time of 24Apr2023 @ 0745. Patient had a vasovagal response while getting study labs drawn today without syncope. During this time, patient became very nauseated. Since patient has  "DM2, POCT glucose resulted at 129 mg/dL.     Dr. Medrano was consulted & believes it to be in the patient's best interest to delay C1D1 by 1 week, and will receive 1L IVF today. Plan was discussed with patient & is in agreement. Patient received uneventful IVF today & was DC'd in stable condition. Please see infusion RN's note for further information.  Patient was encouraged to contact CRC or Neva Medrano team with any questions or concerns & was reminded of clinic's 24/7 emergency contact number. Patient verbalized understanding & denies any additional questions at this time.        Step 2 C1D1 -- RESCHEDULED - 01May2023  Safety and Study labs @ 0730  Marcela @ 0830 - 2nd floor  Infusion @ 1100 - infusion (Nivolumab only)      ** SOLICITED AEs NOT ASSESSED AT THIS VISIT **    Solicited AEs -- Assessed 21Apr2023:  **Anemia - Grade 1 -- 21Apr2023  (NCS per MD)  Platelet count decreased - Grade 0   White blood cell count decreased - Grade 0  Neutrophil count decreased - Grade 0  **Lymphocyte count decreased - Grade 2   (NCS per MD)  Peripheral motor neuropathy - Grade 0   Peripheral sensory neuropathy - Grade 0   Creatinine increased - Grade 0  Hypothyroidism - Grade 0   (TSH WNL on 11Apr2023)  Diarrhea (patient baseline BM = 2 stools a day) - Grade 0 -- reports loose stools vs. diarrhea     **Fatigue - Grade 1 -- see "New AEs Since Last Visit" below    **Nausea - Grade 1  **Cough - Grade 0 -- newly reported symptom -- baseline event since starting lisinopril/HCTZ several years ago  Pneumonitis - Grade 0  **Hyperglycemia - Grade 1 -- Patient has a history of diabetes. No changes in regimen indicated.   Adrenal Insufficiency - Grade 0  *Rash-maculo-papular - Grade 0 - Buttocks -- assessed 20Jan2023, started using CeraVe yesterday (resolved 10Mar2023)  *Pruritis - Grade 0 - Buttocks    (resolved 10Mar2023)  Erythema multiforme - Grade 0  Vomiting - Grade 0  Lipase increased -- Not required per study calendar and " therefore not assessed this visit.      Ongoing Non-Solicited AEs:  Insomnia, int. - Grade 1 - 06Jan2023 - ongoing (R/T steroids given during infusion)  Anorexia - Grade 1 - 09Jan2023 - ongoing (no treatment)  Dysgeusia - Grade 1 - notable since surgery one 3/14/2023      New AEs Since Last Visit:  Worsening fatigue - Grade 2 - [id number] - ongoing  Generalized weakness - Grade 1 - [id number] - ongoing  Nausea - Grade 1 - [id number] - ongoing  Dizziness - Grade 2 - 03Ppp9363 - ongoing    Vasovagal reaction - Grade 4 - [id number] - 24Apr2023       Complete Baseline Medical History, Adverse Events, and Concomitant Medications are located in patient's shadow chart

## 2023-04-26 ENCOUNTER — PATIENT MESSAGE (OUTPATIENT)
Dept: ADMINISTRATIVE | Facility: OTHER | Age: 68
End: 2023-04-26
Payer: MEDICARE

## 2023-04-27 ENCOUNTER — PATIENT MESSAGE (OUTPATIENT)
Dept: ADMINISTRATIVE | Facility: OTHER | Age: 68
End: 2023-04-27
Payer: MEDICARE

## 2023-04-27 ENCOUNTER — PATIENT MESSAGE (OUTPATIENT)
Dept: HEMATOLOGY/ONCOLOGY | Facility: CLINIC | Age: 68
End: 2023-04-27
Payer: MEDICARE

## 2023-04-27 NOTE — TELEPHONE ENCOUNTER
Care Companion Intervention    Reason for intervention: Questionnaire response  Comment:  itching/rashes    Intervention: Education provided to patient  Comment:  No visible rashes. Encouraged to continue cera ve cream as helping. Will notify office if symptoms worsen.

## 2023-04-28 ENCOUNTER — PATIENT MESSAGE (OUTPATIENT)
Dept: ADMINISTRATIVE | Facility: OTHER | Age: 68
End: 2023-04-28
Payer: MEDICARE

## 2023-04-29 ENCOUNTER — PATIENT MESSAGE (OUTPATIENT)
Dept: ADMINISTRATIVE | Facility: OTHER | Age: 68
End: 2023-04-29
Payer: MEDICARE

## 2023-04-29 ENCOUNTER — PATIENT MESSAGE (OUTPATIENT)
Dept: INTERNAL MEDICINE | Facility: CLINIC | Age: 68
End: 2023-04-29
Payer: MEDICARE

## 2023-04-29 DIAGNOSIS — E11.9 DM TYPE 2 WITHOUT RETINOPATHY: Primary | Chronic | ICD-10-CM

## 2023-04-30 ENCOUNTER — EXTERNAL HOME HEALTH (OUTPATIENT)
Dept: HOME HEALTH SERVICES | Facility: HOSPITAL | Age: 68
End: 2023-04-30
Payer: MEDICARE

## 2023-04-30 ENCOUNTER — PATIENT MESSAGE (OUTPATIENT)
Dept: ADMINISTRATIVE | Facility: OTHER | Age: 68
End: 2023-04-30
Payer: MEDICARE

## 2023-05-01 ENCOUNTER — INFUSION (OUTPATIENT)
Dept: INFUSION THERAPY | Facility: HOSPITAL | Age: 68
End: 2023-05-01
Payer: MEDICARE

## 2023-05-01 ENCOUNTER — OFFICE VISIT (OUTPATIENT)
Dept: HEMATOLOGY/ONCOLOGY | Facility: CLINIC | Age: 68
End: 2023-05-01
Payer: MEDICARE

## 2023-05-01 ENCOUNTER — RESEARCH ENCOUNTER (OUTPATIENT)
Dept: RESEARCH | Facility: HOSPITAL | Age: 68
End: 2023-05-01
Payer: MEDICARE

## 2023-05-01 ENCOUNTER — OFFICE VISIT (OUTPATIENT)
Dept: SURGERY | Facility: CLINIC | Age: 68
End: 2023-05-01
Payer: MEDICARE

## 2023-05-01 VITALS
DIASTOLIC BLOOD PRESSURE: 58 MMHG | SYSTOLIC BLOOD PRESSURE: 125 MMHG | HEART RATE: 66 BPM | BODY MASS INDEX: 28.98 KG/M2 | HEIGHT: 73 IN | TEMPERATURE: 98 F | WEIGHT: 218.69 LBS | RESPIRATION RATE: 18 BRPM

## 2023-05-01 VITALS
OXYGEN SATURATION: 98 % | TEMPERATURE: 99 F | SYSTOLIC BLOOD PRESSURE: 102 MMHG | RESPIRATION RATE: 18 BRPM | HEART RATE: 64 BPM | DIASTOLIC BLOOD PRESSURE: 59 MMHG | WEIGHT: 218.69 LBS | HEIGHT: 73 IN | BODY MASS INDEX: 28.98 KG/M2

## 2023-05-01 DIAGNOSIS — N18.30 TYPE 2 DIABETES MELLITUS WITH STAGE 3 CHRONIC KIDNEY DISEASE, WITHOUT LONG-TERM CURRENT USE OF INSULIN, UNSPECIFIED WHETHER STAGE 3A OR 3B CKD: ICD-10-CM

## 2023-05-01 DIAGNOSIS — C15.9 ESOPHAGEAL ADENOCARCINOMA: Primary | ICD-10-CM

## 2023-05-01 DIAGNOSIS — I48.0 PAROXYSMAL ATRIAL FIBRILLATION: ICD-10-CM

## 2023-05-01 DIAGNOSIS — K04.7 DENTAL INFECTION: ICD-10-CM

## 2023-05-01 DIAGNOSIS — J38.00 VOCAL CORD PARALYSIS: ICD-10-CM

## 2023-05-01 DIAGNOSIS — E11.22 TYPE 2 DIABETES MELLITUS WITH STAGE 3 CHRONIC KIDNEY DISEASE, WITHOUT LONG-TERM CURRENT USE OF INSULIN, UNSPECIFIED WHETHER STAGE 3A OR 3B CKD: ICD-10-CM

## 2023-05-01 DIAGNOSIS — I25.10 CORONARY ARTERY DISEASE INVOLVING NATIVE CORONARY ARTERY OF NATIVE HEART WITHOUT ANGINA PECTORIS: ICD-10-CM

## 2023-05-01 DIAGNOSIS — Z93.4 JEJUNOSTOMY TUBE PRESENT: ICD-10-CM

## 2023-05-01 DIAGNOSIS — E11.69 HYPERLIPIDEMIA ASSOCIATED WITH TYPE 2 DIABETES MELLITUS: ICD-10-CM

## 2023-05-01 DIAGNOSIS — C15.9 ESOPHAGEAL ADENOCARCINOMA: Primary | Chronic | ICD-10-CM

## 2023-05-01 DIAGNOSIS — E78.5 HYPERLIPIDEMIA ASSOCIATED WITH TYPE 2 DIABETES MELLITUS: ICD-10-CM

## 2023-05-01 DIAGNOSIS — E11.22 CKD STAGE 3 DUE TO TYPE 2 DIABETES MELLITUS: ICD-10-CM

## 2023-05-01 DIAGNOSIS — E11.59 HYPERTENSION ASSOCIATED WITH DIABETES: ICD-10-CM

## 2023-05-01 DIAGNOSIS — I50.20 HFREF (HEART FAILURE WITH REDUCED EJECTION FRACTION): ICD-10-CM

## 2023-05-01 DIAGNOSIS — N18.30 CKD STAGE 3 DUE TO TYPE 2 DIABETES MELLITUS: ICD-10-CM

## 2023-05-01 DIAGNOSIS — I15.2 HYPERTENSION ASSOCIATED WITH DIABETES: ICD-10-CM

## 2023-05-01 PROCEDURE — 99214 OFFICE O/P EST MOD 30 MIN: CPT | Mod: PBBFAC,25,27 | Performed by: INTERNAL MEDICINE

## 2023-05-01 PROCEDURE — 99999 PR PBB SHADOW E&M-EST. PATIENT-LVL IV: ICD-10-PCS | Mod: PBBFAC,,, | Performed by: INTERNAL MEDICINE

## 2023-05-01 PROCEDURE — 99999 PR PBB SHADOW E&M-EST. PATIENT-LVL III: ICD-10-PCS | Mod: PBBFAC,,, | Performed by: NURSE PRACTITIONER

## 2023-05-01 PROCEDURE — 96413 CHEMO IV INFUSION 1 HR: CPT | Mod: Q1

## 2023-05-01 PROCEDURE — 99999 PR PBB SHADOW E&M-EST. PATIENT-LVL IV: CPT | Mod: PBBFAC,,, | Performed by: INTERNAL MEDICINE

## 2023-05-01 PROCEDURE — 99024 POSTOP FOLLOW-UP VISIT: CPT | Mod: POP,,, | Performed by: NURSE PRACTITIONER

## 2023-05-01 PROCEDURE — 25000003 PHARM REV CODE 250: Performed by: INTERNAL MEDICINE

## 2023-05-01 PROCEDURE — 99213 OFFICE O/P EST LOW 20 MIN: CPT | Mod: PBBFAC,25,Q1,27 | Performed by: NURSE PRACTITIONER

## 2023-05-01 PROCEDURE — 99024 PR POST-OP FOLLOW-UP VISIT: ICD-10-PCS | Mod: POP,,, | Performed by: NURSE PRACTITIONER

## 2023-05-01 PROCEDURE — 99215 PR OFFICE/OUTPT VISIT, EST, LEVL V, 40-54 MIN: ICD-10-PCS | Mod: Q1,S$PBB,, | Performed by: INTERNAL MEDICINE

## 2023-05-01 PROCEDURE — A4216 STERILE WATER/SALINE, 10 ML: HCPCS | Performed by: INTERNAL MEDICINE

## 2023-05-01 PROCEDURE — 99999 PR PBB SHADOW E&M-EST. PATIENT-LVL III: CPT | Mod: PBBFAC,,, | Performed by: NURSE PRACTITIONER

## 2023-05-01 PROCEDURE — 63600175 PHARM REV CODE 636 W HCPCS: Performed by: INTERNAL MEDICINE

## 2023-05-01 PROCEDURE — 99215 OFFICE O/P EST HI 40 MIN: CPT | Mod: Q1,S$PBB,, | Performed by: INTERNAL MEDICINE

## 2023-05-01 RX ORDER — EPINEPHRINE 0.3 MG/.3ML
0.3 INJECTION SUBCUTANEOUS ONCE AS NEEDED
Status: DISCONTINUED | OUTPATIENT
Start: 2023-05-01 | End: 2023-05-01 | Stop reason: HOSPADM

## 2023-05-01 RX ORDER — DIPHENHYDRAMINE HYDROCHLORIDE 50 MG/ML
50 INJECTION INTRAMUSCULAR; INTRAVENOUS ONCE AS NEEDED
Status: CANCELLED | OUTPATIENT
Start: 2023-05-01

## 2023-05-01 RX ORDER — EPINEPHRINE 0.3 MG/.3ML
0.3 INJECTION SUBCUTANEOUS ONCE AS NEEDED
Status: CANCELLED | OUTPATIENT
Start: 2023-05-01

## 2023-05-01 RX ORDER — HEPARIN 100 UNIT/ML
500 SYRINGE INTRAVENOUS
Status: CANCELLED | OUTPATIENT
Start: 2023-05-01

## 2023-05-01 RX ORDER — HEPARIN 100 UNIT/ML
500 SYRINGE INTRAVENOUS
Status: DISCONTINUED | OUTPATIENT
Start: 2023-05-01 | End: 2023-05-01 | Stop reason: HOSPADM

## 2023-05-01 RX ORDER — SODIUM CHLORIDE 0.9 % (FLUSH) 0.9 %
10 SYRINGE (ML) INJECTION
Status: DISCONTINUED | OUTPATIENT
Start: 2023-05-01 | End: 2023-05-01 | Stop reason: HOSPADM

## 2023-05-01 RX ORDER — DIPHENHYDRAMINE HYDROCHLORIDE 50 MG/ML
50 INJECTION INTRAMUSCULAR; INTRAVENOUS ONCE AS NEEDED
Status: DISCONTINUED | OUTPATIENT
Start: 2023-05-01 | End: 2023-05-01 | Stop reason: HOSPADM

## 2023-05-01 RX ORDER — SODIUM CHLORIDE 0.9 % (FLUSH) 0.9 %
10 SYRINGE (ML) INJECTION
Status: CANCELLED | OUTPATIENT
Start: 2023-05-01

## 2023-05-01 RX ADMIN — Medication 10 ML: at 11:05

## 2023-05-01 RX ADMIN — HEPARIN 500 UNITS: 100 SYRINGE at 11:05

## 2023-05-01 RX ADMIN — SODIUM CHLORIDE: 9 INJECTION, SOLUTION INTRAVENOUS at 09:05

## 2023-05-01 NOTE — PROGRESS NOTES
: URIEL Manriquez MD  Treating Investigators: NIRAV Medrano MD  Surgical Oncologist: SARAH Brown M.D. 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 Patient queried & verbalized his consent to continue on above-mentioned study. Patient queried & reports feeling much better from last week, but continues to report Grade 2 fatigue, generalized weakness, and nausea after eating. Patient queried & denies any changes to his medications.    Patient completed C1D1 safety & study labs, VS, PE & ECOG (ECOG = 0, per Dr. Medrano) today per protocol. Dr. Medrano assessed all patient's labs, VS & PE findings as either WNL or NCS, deeming patient eligible to initiate treatment with Nivolumab monotherapy today.      Per protocol, Nivolumab is administered at a 480 mg flat dose & cannot be modified. CRC & Dr. Medrano performed time-out in exam room.      Infusion RN Carmen Gonzalez was instructed to administer the treatment per the treatment plan. RN expressed their understanding of all instructions. Patient completed treatment today without event & was DC'd from clinic ambulatory and in stable condition. Please see Infusion RN note for further information.      Patient was encouraged to contact CRC or Neva Medrano team with any questions or concerns & was reminded of clinic's 24/7 emergency contact number. Patient verbalized understanding & denies any additional questions at this time.        Step 2 C2D1 - [id number]:  Safety labs @ 1250  Marcela @ 1400 - 2nd floor  Infusion @  1500 - infusion (Nivolumab only)        Solicited AEs -- Assessed 01May2023:  **Anemia - Grade 1 -- (NCS per MD)  Platelet count decreased - Grade 0   White blood cell count decreased - Grade 0  Neutrophil count decreased - Grade 0  **Lymphocyte count decreased - Grade 2   (NCS per MD)  Peripheral motor neuropathy - Grade 0   Peripheral sensory neuropathy - Grade 0   Creatinine increased - Grade 0  Hypothyroidism - Grade 0   (TSH WNL on 11Apr2023)  Diarrhea (patient baseline BM = 2 stools a day) - Grade 0 -- reports loose stools vs. diarrhea   **Fatigue - Grade 2   **Nausea - Grade 1  **Cough - Grade 1  -- Medical History  Pneumonitis - Grade 0  **Hyperglycemia - Grade 1 -- Medical History -- No changes in regimen indicated.   Adrenal Insufficiency - Grade 0  Rash-maculo-papular - Grade 0  Pruritis - Grade 0  Erythema multiforme - Grade 0  Vomiting - Grade 0    Lipase increased -- Not required per study calendar and therefore not assessed this visit.      Ongoing Non-Solicited AEs:  Insomnia, int. - Grade 1 - 06Jan2023 - ongoing (R/T steroids given during infusion)  Anorexia - Grade 1 - 09Jan2023 - ongoing (no treatment)  Dysgeusia - Grade 1 - notable since surgery one 3/14/2023  Generalized weakness - Grade 1 - 21Apr2023 - ongoing    New or Changed AEs Since Last Visit:  Dizziness - Grade 2 - 24Apr2023 - 26Apr2023       Complete Baseline Medical History, Adverse Events, and Concomitant Medications are located in patient's shadow chart

## 2023-05-01 NOTE — PLAN OF CARE
Patient tolerated Trial [id number] well today. Study labs drawn from port prior to infusion. Met with MACARIO Stahl at chairside. NAD noted upon discharge. Declined avs, uses [facility]. Discharged home, ambulated independently.

## 2023-05-01 NOTE — PROGRESS NOTES
Post-Op Follow-up Visit:   5/1/2023  Patient ID: Lukasz Person is a 68 y.o. male, born 1955    Chief Complaint   Patient presents with    Post-op Evaluation     8/2022: PCP for progressive dysphagia  11/17/22: EGD:nodular mucosa in the esophagus at the GE junction. Esophageal mucosal changes suggestive of short-segment Paredes's esophagus. Biopsies positive for adenocarcinoma.   12/7/22: EUS: Partially obstructing, malignant esophageal tumor was found in the lower third of the esophagus staged T3 N0 Mx by endosonographic criteria.   12/8/22: PET CT: Distal esophageal wall thickening and hypermetabolic activity in keeping with known esophageal cancer with no definite evidence of metastatic disease  Neoadj CRT per Dr. Medrano and Kenney  3/14/22: robotic esophagectomy with J tube per Dr. Brown  3/18/2023: s/p injection laryngoplasty to left true vocal fold per Dr. Leal  3/27/23: Esophragram with no evidence of postoperative leak.    Interval History: This 69 y/o gentleman returns to clinic from [facility]. He was last seen in clinic on 4/17/2023 when J feeds were discontinued. He was seen earlier today by Dr. Medrano who is planning to initiate IO infusion today.   Overall he is feeling well. Appetite is improving, he is trying to eat or drink small portions every 1-2 hours throughout the day. C/o intermittent nausea, denies vomit but reports 3 episodes of dry heaves in 2 past weeks. Oral diet recall includes: chicken sausage, peanut butter on waffles or crackers, tunafish sandwich, queso dip with chips, fruit smoothie, chicken and stir garcia vegetables. He does have the sensation that pills or food get stuck but denies odynophagia.   He has not used J tube for nutrition since his last appt but states he is flushing J tube BID without difficulty. His home scale weight yesterday was 217#, down 4# since last visit.    His voice remains hoarse.     Physical Exam:  General:  Non-toxic, ambulatory  Abd:  Soft,  non-tender  Incision:  J tube sutured in place    Pathology:  Procedure - Esophageal gastrectomy Tumor site - GE Junction   Relationship of tumor to esophagogastric junction - Lesion is central at GE junction   Tumor size - 3.0 x 3.1 cm  Histologic type - Adenocarcinoma   Histologic grade - Moderately differentiated   Microscopic tumor extension - Tumor invades adventitia   Margins - All margins of resection are uninvolved, proximal, distal and adventitial margins are negative   Treatment effect - Extensive residual cancer with no evident tumor regression (poor or no response, score 3) Lymphovascular invasion - Not identified   Pathologic staging - yp T3 N0 Mx   Lymph nodes examined - 12 Lymph nodes involved - 0   Additional pathologic findings - Intestinal metaplasia present      ICD-10-CM ICD-9-CM    1. Esophageal adenocarcinoma  C15.9 150.9       2. Jejunostomy tube present  Z93.4 V44.4       Plan   F/u with Dr. Mack for adj immunotherapy    Remove J tube today. Reinforced post esophagectomy diet guidelines - small portions, chew well, eat slowly. Resume protein supplement DAILY.   F/u with CORINA Clark RD for nutrition.   F/u with ENT Dr. Valdivia as scheduled on 5/22/23.  Increase physical activity     Follow up in about 4 weeks (around 5/29/2023). Coordinate appt with Dr. Medrano on a Monday.    Questions were asked and answered to patient's satisfaction.      Pt seen in conjunction with Dr. Brown today.         Syeda Galvan NP  Upper GI / Hepatobiliary Surgical Oncology  [facility] [place], [place]  Office: [phone]  Fax: [phone]

## 2023-05-01 NOTE — PROGRESS NOTES
MEDICAL ONCOLOGY - ESTABLISHED PATIENT VISIT    Reason for visit: esophageal cancer    Best Contact Phone Number(s): There are no phone numbers on file.     Cancer/Stage/TNM:    Cancer Staging   Esophageal adenocarcinoma  Staging form: Esophagus - Adenocarcinoma, AJCC 8th Edition  - Pathologic stage from 3/28/2023: Stage II (ypT3, pN0, cM0, G2) - Signed by Santana Brown Jr., MD on 3/28/2023       Oncology History   Esophageal adenocarcinoma   12/8/2022 Initial Diagnosis    Esophageal adenocarcinoma       12/21/2022 - 12/21/2022 Chemotherapy    Treatment Summary   Plan Name: OP ESOPHAGEAL PACLITAXEL CARBOPLATIN WEEKLY  Treatment Goal: Curative  Status: Inactive  Start Date:   End Date:   Provider: Miki Medrano MD  Chemotherapy: CARBOplatin (PARAPLATIN) in sodium chloride 0.9% 250 mL chemo infusion, , Intravenous, Clinic/HOD 1 time, 0 of 1 cycle  PACLitaxeL (TAXOL) 50 mg/m2 = 120 mg in sodium chloride 0.9% 250 mL chemo infusion, 50 mg/m2, Intravenous, Clinic/HOD 1 time, 0 of 1 cycle       12/29/2022 - 1/20/2023 Chemotherapy    Treatment Summary   Plan Name: [facility] [id number] ARM B CARBOPLATIN PACLITAXEL NIVOLUMAB  Treatment Goal: Curative  Status: Inactive  Start Date: 12/29/2022  End Date: 1/20/2023  Provider: Miki Medrano MD  Chemotherapy: CARBOplatin (PARAPLATIN) 215 mg in sodium chloride 0.9% 306.5 mL chemo infusion, 215 mg, Intravenous, Clinic/HOD 1 time, 4 of 5 cycles  Administration: 215 mg (12/29/2022), 230 mg (1/5/2023), 265 mg (1/13/2023), 265 mg (1/20/2023)  PACLitaxeL (TAXOL) 50 mg/m2 = 120 mg in sodium chloride 0.9% 250 mL chemo infusion, 50 mg/m2 = 120 mg, Intravenous, Clinic/HOD 1 time, 4 of 5 cycles  Dose modification: 50 mg/m2 (original dose 50 mg/m2, Cycle 4)  Administration: 120 mg (12/29/2022), 120 mg (1/5/2023), 120 mg (1/13/2023), 120 mg (1/20/2023)       12/29/2022 - 2/1/2023 Radiation Therapy    Treating physician: Dr. Javier Moulton    Course: C1 CHEST 2022    Treatment Site  Ref. ID Energy Dose/Fx (Gy) #Fx Dose Correction (Gy) Total Dose (Gy) Start Date End Date Elapsed Days   IM Esophagus [id number] 6X 1.8 23 / 23 0 41.4 12/29/2022 2/1/2023 34        3/17/2023 Surgery    Procedure: Procedure(s) (LRB):  XI ROBOTIC ESOPHAGECTOMY (N/A)  ROBOTIC JEJUNOSTOMY TUBE INSERTION (N/A)      Surgeon(s) and Role:     * Santana Brown Jr., MD - Primary     * Darian Murphy MD - Assisting     Assistance: Due to having no qualified resident during critical portions of the case, Dr. Darian Murphy acted as an assistant.     Pre-Operative Diagnosis: Esophageal adenocarcinoma, distal 1/3     Post-Operative Diagnosis: Same     Pre-Operative Variables:  Stage: T3 N0  Adjacent organ involvement: None  Chemotherapy within 90 Days: Yes  Radiation Therapy within 90 Days: Yes     Comorbidities:  Morbid obesity  Type 2 diabetes mellitus  Obstructive sleep apnea  Gout  Hyperparathyroidism  Hypertension  Severe obesity  Coronary artery disease  Heart failure with reduced ejection fraction    Procedure:  Robotic-assisted Vj three field esophagectomy  Regional mediastinal lymph node dissection  Botox injection of the pylorus  Jejunostomy tube placement     Operative Findings:                No evidence of distant intraperitoneal metastases in the chest or abdomen  Esophageal tumor located in the distal third of the esophagus, mild radiation changes appreciated.  Resection status:  R0 pending final pathology.  No gross disease remaining post dissection.  Frozen sections on proximal and distal margins negative for malignancy  100u Botox injection into the pylorus  Stapled esophagogastrostomy performed at the neck incision after confirmation of negative margins.  Jejunostomy tube placed      3/28/2023 Cancer Staged    Staging form: Esophagus - Adenocarcinoma, AJCC 8th Edition  - Pathologic stage from 3/28/2023: Stage II (ypT3, pN0, cM0, G2)       5/1/2023 -  Chemotherapy    Treatment Summary   Plan Name: [facility] [id number]  ARM C ADJUVANT NIVOLUMAB  Treatment Goal: Curative  Status: Active  Start Date: 5/1/2023  End Date: 10/16/2023 (Planned)  Provider: Miki Medrano MD  Chemotherapy: [No matching medication found in this treatment plan]            Interim History:     Mr. Person returns to clinic today to start cycle 1 of adjuvant nivolumab  He underwent robotic esophagectomy on 3/14/23 with Dr. Brown and J tube insertion. Had L vocal cord palsy post-operatively that was treated with injection laryngoplasty on 3/18/23.    Because of dental infection we held off on starting treatment last week.  He was placed on amoxicillin and has improvement in his symptoms.  He has been recommended for a root canal but this has not yet been scheduled.    No other new complaints.  Feels ready to start treatment.    Review of Systems   Constitutional:  Positive for weight loss. Negative for malaise/fatigue.   HENT:  Negative for sore throat.    Eyes:  Negative for blurred vision and double vision.   Respiratory:  Negative for cough and shortness of breath.    Cardiovascular:  Negative for chest pain.   Gastrointestinal:  Negative for abdominal pain and diarrhea.   Genitourinary:  Negative for frequency.   Musculoskeletal:  Negative for back pain.   Skin:  Negative for rash.   Neurological:  Negative for headaches.   Psychiatric/Behavioral:  The patient is not nervous/anxious.      Past Medical History:   Past Medical History:   Diagnosis Date    Anticoagulant long-term use     Diabetes mellitus     Onset late 50s/early 60s    Hyperlipidemia     Hypertension     Onset late 50s/early 60s    Kidney stones     Sleep apnea     since 2006        Past Surgical History:   Past Surgical History:   Procedure Laterality Date    CORONARY ANGIOGRAPHY N/A 9/30/2020    Procedure: ANGIOGRAM, CORONARY ARTERY;  Surgeon: John West MD;  Location: [facility] CATH LAB;  Service: Cardiology;  Laterality: N/A;    CYSTOSCOPY N/A 2/17/2022    Procedure: CYSTOSCOPY;   Surgeon: Lukasz Hughes MD;  Location: [facility] OR [facility] FLR;  Service: Urology;  Laterality: N/A;    CYSTOSCOPY W/ URETERAL STENT PLACEMENT N/A 2/25/2022    Procedure: CYSTOSCOPY, WITH URETERAL STENT INSERTION;  Surgeon: Lukasz Hughes MD;  Location: [facility] OR 1ST FLR;  Service: Urology;  Laterality: N/A;    ENDOSCOPIC ULTRASOUND OF UPPER GASTROINTESTINAL TRACT N/A 12/7/2022    Procedure: ULTRASOUND, UPPER GI TRACT, ENDOSCOPIC;  Surgeon: Darian Main MD;  Location: [facility] ENDO;  Service: Endoscopy;  Laterality: N/A;  Approval to hold Xarelto rec'd from Dr. Nick (see t/e 12/5/22)-DS    ESOPHAGOGASTRODUODENOSCOPY N/A 11/17/2022    Procedure: EGD (ESOPHAGOGASTRODUODENOSCOPY);  Surgeon: Brody Gonzales MD;  Location: [facility] (2ND FLR);  Service: Endoscopy;  Laterality: N/A;  inst via email-ok to hold Xarelto x 2 days-MS    LASER LITHOTRIPSY Left 2/25/2022    Procedure: LITHOTRIPSY, USING LASER;  Surgeon: Lukasz Hughes MD;  [facility];  Service: Urology;  Laterality: Left;    LEFT HEART CATHETERIZATION Left 9/30/2020    Procedure: Left heart cath;  Surgeon: John West MD;  Location: [facility] CATH LAB;  Service: Cardiology;  Laterality: Left;    LITHOTRIPSY      PARATHYROIDECTOMY  1/1/2-107    PYELOSCOPY Left 2/25/2022    Procedure: PYELOSCOPY;  Surgeon: Lukasz Hughes MD;  [facility];  Service: Urology;  Laterality: Left;    ROBOT-ASSISTED SURGICAL REMOVAL OF ESOPHAGUS USING DA CARLOS XI N/A 3/14/2023    Procedure: XI ROBOTIC ESOPHAGECTOMY;  Surgeon: Santana Brown Jr., MD;  [facility];  Service: General;  Laterality: N/A;  Abdomen, Chest and Neck    ROBOTIC JEJUNOSTOMY N/A 3/14/2023    Procedure: ROBOTIC JEJUNOSTOMY TUBE INSERTION;  Surgeon: Santana Brown Jr., MD;  [facility];  Service: General;  Laterality: N/A;    TRANSESOPHAGEAL ECHOCARDIOGRAPHY N/A 4/7/2022    Procedure: ECHOCARDIOGRAM, TRANSESOPHAGEAL;  Surgeon: Dosc Diagnostic  Provider;  Location: [facility] EP LAB;  Service: Cardiology;  Laterality: N/A;    TREATMENT OF CARDIAC ARRHYTHMIA N/A 2022    Procedure: Cardioversion or Defibrillation;  Surgeon: Iris Fisher NP;  Location: [facility] EP LAB;  Service: Cardiology;  Laterality: N/A;  afib, dccv, artie, anes, EH, 3prep    URETEROSCOPIC REMOVAL OF URETERIC CALCULUS Left 2022    Procedure: REMOVAL, CALCULUS, URETER, URETEROSCOPIC;  Surgeon: Lukasz Hughes MD;  Location: [facility] OR [facility] FLR;  Service: Urology;  Laterality: Left;    URETEROSCOPY Left 2022    Procedure: URETEROSCOPY;  Surgeon: Lukasz Hughes MD;  Location: [facility] OR [facility] FLR;  Service: Urology;  Laterality: Left;    VOCAL CORD INJECTION Left 3/17/2023    Procedure: INJECTION, VOCAL CORD, LARYNGOSCOPIC;  Surgeon: Gm Altman MD;  Location: [facility] OR 2ND FLR;  Service: ENT;  Laterality: Left;        Family History:   Family History   Problem Relation Age of Onset    Arthritis Mother     Heart disease Father 70        CABG    Arthritis Father     Diabetes Father     Kidney disease Father         had one kidney removed in early thirties    Arthritis Sister     Arthritis Sister     Hypertension Sister     Colon cancer Brother 32    Arthritis Brother     Alcohol abuse Paternal Aunt     Cancer Maternal Grandfather         throat cancer    Diabetes Paternal Grandmother     Colon polyps Paternal Grandfather     Colon cancer Paternal Grandfather     Cancer Paternal Grandfather         colon cancer at age 62        Social History:   Social History     Tobacco Use    Smoking status: Former     Packs/day: 1.00     Years: 7.00     Pack years: 7.00     Types: Cigarettes, Cigars     Start date: 1972     Quit date:      Years since quittin.9     Passive exposure: Never    Smokeless tobacco: Never    Tobacco comments:     smoking was off and on.  cumulative 7 years.  never more than three years in one stretch or more jl   Substance Use Topics    Alcohol use: Yes      Alcohol/week: 3.0 standard drinks     Types: 2 Cans of beer, 1 Shots of liquor per week     Comment: don't drink regularly.  6 to 7 monthly      I have reviewed and updated the patient's past medical, surgical, family and social histories.    Allergies:   Review of patient's allergies indicates:  No Known Allergies     Medications:   Current Outpatient Medications   Medication Sig Dispense Refill    allopurinoL (ZYLOPRIM) 100 MG tablet TAKE 1 TABLET BY MOUTH EVERY DAY 30 tablet 10    amoxicillin (AMOXIL) 500 MG Tab Take 500 mg by mouth 3 (three) times daily.      blood sugar diagnostic (ACCU-CHEK ANTONELLA PLUS TEST STRP) Strp Use to test CBG four times daily E11.65 400 strip 1    blood-glucose meter kit Checks blood sugars 1x/daily. 1 each 12    lancets (ACCU-CHEK SOFTCLIX LANCETS) Misc Use to test CBG 4 times daily E11.65 400 each 1    lisinopriL-hydrochlorothiazide (PRINZIDE,ZESTORETIC) 10-12.5 mg per tablet Take 1 tablet by mouth once daily. 90 tablet 3    metFORMIN 500 mg/5 mL Soln Take 1,000 mg by mouth 2 (two) times a day. 1800 mL 3    metoprolol succinate (TOPROL-XL) 25 MG 24 hr tablet Take 1 tablet (25 mg total) by mouth once daily. 30 tablet 11    nitroGLYCERIN (NITROSTAT) 0.4 MG SL tablet Place 1 tablet (0.4 mg total) under the tongue every 5 (five) minutes as needed for Chest pain. If you need a third tablet, call 911 60 tablet 12    omeprazole (PRILOSEC) 40 MG capsule Take 1 capsule (40 mg total) by mouth once daily. 90 capsule 3    rivaroxaban (XARELTO) 20 mg Tab Take 1 tablet (20 mg total) by mouth daily with dinner or evening meal. 30 tablet 11    rosuvastatin (CRESTOR) 20 MG tablet TAKE 1 TABLET(20 MG) BY MOUTH EVERY DAY (Patient taking differently: Take 20 mg by mouth every evening.) 30 tablet 11    tamsulosin (FLOMAX) 0.4 mg Cap Take 1 capsule (0.4 mg total) by mouth once daily. (Patient taking differently: Take 0.4 mg by mouth nightly.) 30 capsule 11    testosterone (ANDROGEL) 20.25 mg/1.25 gram  "(1.62 %) GlPm Apply 4 pumps to shoulders daily 2 each 5     No current facility-administered medications for this visit.        Physical Exam:   BP (!) 102/59 (BP Location: Left arm, Patient Position: Sitting, BP Method: Medium (Automatic))   Pulse 64   Temp 99.3 °F (37.4 °C) (Oral)   Resp 18   Ht 6' 1" (1.854 m)   Wt 99.2 kg (218 lb 11.1 oz)   SpO2 98%   BMI 28.85 kg/m²      ECOG Performance status: 0    Physical Exam  Vitals reviewed.   Constitutional:       General: He is not in acute distress.     Appearance: Normal appearance. He is not ill-appearing, toxic-appearing or diaphoretic.   HENT:      Head: Normocephalic and atraumatic.   Eyes:      General: No scleral icterus.     Extraocular Movements: Extraocular movements intact.      Conjunctiva/sclera: Conjunctivae normal.      Pupils: Pupils are equal, round, and reactive to light.   Neck:      Comments: L neck wound well-healing  Cardiovascular:      Rate and Rhythm: Normal rate and regular rhythm.      Heart sounds: Normal heart sounds. No murmur heard.  Pulmonary:      Effort: Pulmonary effort is normal. No respiratory distress.      Breath sounds: Normal breath sounds. No wheezing or rales.   Abdominal:      General: Bowel sounds are normal. There is no distension.      Palpations: Abdomen is soft.      Tenderness: There is no abdominal tenderness.      Comments: J tube in place, clean, no oozing   Musculoskeletal:         General: No swelling.      Cervical back: Normal range of motion.   Lymphadenopathy:      Cervical: No cervical adenopathy.   Skin:     Coloration: Skin is not jaundiced.      Findings: No bruising, erythema or rash.   Neurological:      General: No focal deficit present.      Mental Status: He is alert and oriented to person, place, and time. Mental status is at baseline.      Cranial Nerves: No cranial nerve deficit.      Motor: No weakness.      Gait: Gait normal.   Psychiatric:         Mood and Affect: Mood normal.         " Behavior: Behavior normal.         Thought Content: Thought content normal.         Judgment: Judgment normal.       Labs:   Recent Results (from the past 48 hour(s))   CBC W/ AUTO DIFFERENTIAL    Collection Time: 05/01/23  7:52 AM   Result Value Ref Range    WBC 4.90 3.90 - 12.70 K/uL    RBC 3.78 (L) 4.60 - 6.20 M/uL    Hemoglobin 11.2 (L) 14.0 - 18.0 g/dL    Hematocrit 34.4 (L) 40.0 - 54.0 %    MCV 91 82 - 98 fL    MCH 29.6 27.0 - 31.0 pg    MCHC 32.6 32.0 - 36.0 g/dL    RDW 14.0 11.5 - 14.5 %    Platelets 176 150 - 450 K/uL    MPV 9.8 9.2 - 12.9 fL    Immature Granulocytes 0.2 0.0 - 0.5 %    Gran # (ANC) 3.6 1.8 - 7.7 K/uL    Immature Grans (Abs) 0.01 0.00 - 0.04 K/uL    Lymph # 0.6 (L) 1.0 - 4.8 K/uL    Mono # 0.4 0.3 - 1.0 K/uL    Eos # 0.3 0.0 - 0.5 K/uL    Baso # 0.03 0.00 - 0.20 K/uL    nRBC 0 0 /100 WBC    Gran % 73.3 (H) 38.0 - 73.0 %    Lymph % 11.6 (L) 18.0 - 48.0 %    Mono % 9.0 4.0 - 15.0 %    Eosinophil % 5.3 0.0 - 8.0 %    Basophil % 0.6 0.0 - 1.9 %    Differential Method Automated    COMPREHENSIVE METABOLIC PANEL    Collection Time: 05/01/23  7:52 AM   Result Value Ref Range    Sodium 134 (L) 136 - 145 mmol/L    Potassium 4.5 3.5 - 5.1 mmol/L    Chloride 97 95 - 110 mmol/L    CO2 28 23 - 29 mmol/L    Glucose 217 (H) 70 - 110 mg/dL    BUN 15 8 - 23 mg/dL    Creatinine 1.2 0.5 - 1.4 mg/dL    Calcium 9.5 8.7 - 10.5 mg/dL    Total Protein 6.8 6.0 - 8.4 g/dL    Albumin 3.8 3.5 - 5.2 g/dL    Total Bilirubin 0.7 0.1 - 1.0 mg/dL    Alkaline Phosphatase 107 55 - 135 U/L    AST 29 10 - 40 U/L    ALT 38 10 - 44 U/L    Anion Gap 9 8 - 16 mmol/L    eGFR >60.0 >60 mL/min/1.73 m^2   Magnesium    Collection Time: 05/01/23  7:52 AM   Result Value Ref Range    Magnesium 1.6 1.6 - 2.6 mg/dL   PHOSPHORUS    Collection Time: 05/01/23  7:52 AM   Result Value Ref Range    Phosphorus 3.1 2.7 - 4.5 mg/dL   BILIRUBIN, DIRECT    Collection Time: 05/01/23  7:52 AM   Result Value Ref Range    Bilirubin, Direct 0.3 0.1 - 0.3  mg/dL        I have reviewed the pertinent labs from 23 which are notable for mild anemia.  Cr 1.2, normal LFTs.    Imagin2022 - EUS  Impression:             - Esophageal mucosal changes consistent with Paredes's esophagus.   - Partially obstructing, malignant esophageal tumor was found in the lower third of the esophagus.   - Normal stomach.   - Normal examined duodenum.   - A mass was found in the lower third of the esophagus. A tissue diagnosis was obtained prior to this exam. This is of adenocarcinoma. This was staged T3 (based on invasion into) N0 Mx by endosonographic criteria.   - No specimens collected.     3/1/22 - PET CT   Impression:     1.  Decreased uptake in persistent distal esophageal thickening suggestive of partial response to therapy.  Previous non hypermetabolic gastrohepatic and perigastric lymph nodes are stable to slightly decreased in size.     2.  No new FDG avid lesions to suggest mixed response to therapy.     3.  Incidental new hypermetabolic gluteal cutaneous thickening, likely benign.  Recommend correlation with history and physical examination.    Path:   3/14/23:  Final Pathologic Diagnosis 1. LYMPH NODE, LEVEL 7, EXCISION:   One benign lymph node (0/1)   2. TOTAL THORACIC ESOPHAGECTOMY AND PROXIMAL STOMACH:   Adenocarcinoma, moderately differentiated, 3.0 cm   Margins are negative   Tumor invades adventitia   Extensive residual cancer with no evident tumor regression (poor or no   response, score 3)   Eleven benign lymph nodes (0/11)   3. RESIDUAL CONDUIT, EXCISION:   Negative for malignancy   SYNOPTIC REPORT   Procedure - Esophageal gastrectomy   Tumor site - GE Junction   Relationship of tumor to esophagogastric junction - Lesion is central at GE   junction   Tumor size - 3.0 x 3.1cm   Histologic type - Adenocarcinoma   Histologic grade - Moderately differentiated   Microscopic tumor extension - Tumor invades adventitia   Margins - All margins of resection are  uninvolved, proximal, distal and   adventitial margins are negative   Treatment effect - Extensive residual cancer with no evident tumor regression   (poor or no response, score 3)   Lymphovascular invasion - Not identified   Pathologic staging - yp T3 N0 Mx   Lymph nodes examined - 12   Lymph nodes involved - 0   Additional pathologic findings - Intestinal metaplasia present   MMR-IHC has been performed on previous biopsy ([id number]) and shows all 4   antibodies intact          Assessment:       1. Esophageal adenocarcinoma    2. Vocal cord paralysis    3. Hypertension associated with diabetes    4. Hyperlipidemia associated with type 2 diabetes mellitus    5. Type 2 diabetes mellitus with stage 3 chronic kidney disease, without long-term current use of insulin, unspecified whether stage 3a or 3b CKD    6. CKD stage 3 due to type 2 diabetes mellitus    7. Coronary artery disease involving native coronary artery of native heart without angina pectoris    8. Paroxysmal atrial fibrillation    9. HFrEF (heart failure with reduced ejection fraction)    10. Dental infection              Plan:     # Esophageal adenocarcinoma   Mr. Person is a pleasant 67 year old male who presents to our clinic for management of esophageal cancer. He initially presented with dysphagia, and further workup confirmed a stage II adenocarcinoma, pMMR. Tumor staged T3N0Mx by endosonographic criteria, JEVON. CT CAP on 12/14/22 confirmed no evidence of metastatic disease.    Previously conversation regarding his diagnosis and treatment options.  Recommended perioperative chemo/radiation per the CROSS trial. Discussed chemoradiation for ~5 weeks with 5 doses of weekly carboplatin and taxol administered. Plan to obtain restaging scans 4-5 weeks after radiation completed.     He was a candidate for a cooperative group trial assessing perioperative immunotherapy treatment. He consented for this study - ECOG-ACRIN [id number]: A Phase II/III Study of  Dilcia-operative Nivolumab and Ipilimumab in Patients with Locoregional Esophageal and Gastroesophageal Junction Adenocarcinoma    Previously met with Dr. Santana Brown who agreed he was a surgical candidate.  Previously met with Dr. Javier Moulton who will be treating him with radiation.    Began cycle 1 carboplatin/paclitaxel/nivolumab on trial on 12/29/22.  Received cycle 4 of weekly chemotherapy on trial on 1/20/23.   We held chemotherapy for week 5 because of thrombocytopenia per protocol.    Completed radiation on 2/1/23.    Restaging PET/CT personally reviewed on 3/1/23 shows interval decreased FDG avidity in esophageal tumor, no new sites of disease.  CT CAP 3/2/23 shows stable esophageal thickening.    Underwent robotic esophagectomy on 3/14/23 with Dr. Brown.  Pathology showed negative margins, ypT3 N0 tumor with 0 of 12 lymph nodes involved.  No treatment effect was noted on the tumor tissue.    Discussed that per the [id number] protocol will proceed with randomization to adjuvant nivolumab +/- ipilimumab.  Patient randomized to receive adjuvant Nivolumab, will start cycle 1 at 480 mg q4 weeks today.  Plan for six months per protocol.    Orders and labs reviewed.  Timeout occurred with me and Maria Esther SORTO.  Orders signed.    Has stopped tube feeds, tolerating PO.  Has mild weight loss.   Pathology has send for PDL1 level.  Pending.     # Vocal cord paralysis  Post-op. S/p injection by ENT.    # HTN, HLD, DM, CKD  Following with PCP Dr. Wilson and nephrologist Dr. Oconnell.   BP normal today, enrolled in chemo care companion.     Continue medical management.    # CAD, A fib, CHF  Following with cardiologist Dr. Nick & EP Dr. Phillip.   Currently asymptomatic.   On Xarelto.   Continue medical management.     # Dental Infection  Completed abx.  Will have root canal scheduled.  Should not interfere with nivolumab treatment.    Follow up: 4 weeks per research.    Miki Medrano  MD  Hematology/Oncology  [facility]    Route Chart for Scheduling    Med Onc Chart Routing      Follow up with physician . Per research team.   Follow up with SAMUEL    Infusion scheduling note    Injection scheduling note    Labs    Imaging    Pharmacy appointment    Other referrals           Treatment Plan Information   [facility] [id number] ARM C ADJUVANT NIVOLUMAB   Miki Medrano MD   Upcoming Treatment Dates - [facility] [id number] ARM C ADJUVANT NIVOLUMAB    5/29/2023       Chemotherapy       INV nivolumab 480 mg in INV sodium chloride 0.9 % 100 mL chemo infusion  6/12/2023       Chemotherapy       INV nivolumab 480 mg in INV sodium chloride 0.9 % 100 mL chemo infusion  6/26/2023       Chemotherapy       INV nivolumab 480 mg in INV sodium chloride 0.9 % 100 mL chemo infusion  7/10/2023       Chemotherapy       INV nivolumab 480 mg in INV sodium chloride 0.9 % 100 mL chemo infusion    Supportive Plan Information  IV FLUIDS AND ELECTROLYTES   Miki Medrano MD   Upcoming Treatment Dates - IV FLUIDS AND ELECTROLYTES    No upcoming days in selected categories.    Therapy Plan Information  Flushes  heparin, porcine (PF) 100 unit/mL injection flush 500 Units  500 Units, Intravenous, Every visit  sodium chloride 0.9% flush 10 mL  10 mL, Intravenous, Every visit

## 2023-05-01 NOTE — PLAN OF CARE
Problem: Adult Inpatient Plan of Care  Goal: Optimal Comfort and Wellbeing  Intervention: Provide Person-Centered Care  Flowsheets (Taken 5/1/2023 1135)  Trust Relationship/Rapport:   care explained   questions answered   choices provided   questions encouraged   emotional support provided   reassurance provided   empathic listening provided   thoughts/feelings acknowledged

## 2023-05-03 ENCOUNTER — PATIENT MESSAGE (OUTPATIENT)
Dept: ADMINISTRATIVE | Facility: OTHER | Age: 68
End: 2023-05-03
Payer: MEDICARE

## 2023-05-03 ENCOUNTER — RESEARCH ENCOUNTER (OUTPATIENT)
Dept: RESEARCH | Facility: HOSPITAL | Age: 68
End: 2023-05-03
Payer: MEDICARE

## 2023-05-03 ENCOUNTER — TELEPHONE (OUTPATIENT)
Dept: HEMATOLOGY/ONCOLOGY | Facility: CLINIC | Age: 68
End: 2023-05-03
Payer: MEDICARE

## 2023-05-03 NOTE — TELEPHONE ENCOUNTER
: URIEL Manriquez MD  Treating Investigators: NIRAV Medrano MD  Surgical Oncologist: SARAH Brown M.D. 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 Patient was informed that there were dosing changes to the protocol, in which the Nivolumab is given at 240 mg every 2 weeks changed to 480 mg every 4 weeks. Patient informed that since both regimens are standard of care, so Dr. Medrano believes there is no increased risk to his safety, as the cumulative monthly dose is 480 mg under either the 2 week of 4 week treatment schedule.   Patient queried & denies any questions or concerns at this point, but will contact site staff if he or his wife do have questions at a later time.

## 2023-05-03 NOTE — PROGRESS NOTES
: URIEL Manriquez MD  Treating Investigators: NIRAV Medrano MD  Surgical Oncologist: SARAH Brown M.D. 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 Patient was informed that there were dosing changes to the protocol, in which the Nivolumab is given at 240 mg every 2 weeks changed to 480 mg every 4 weeks. Patient informed that since both regimens are standard of care, so Dr. Medrano believes there is no increased risk to his safety, as the cumulative monthly dose is 480 mg under either the 2 week of 4 week treatment schedule.   Patient queried & denies any questions or concerns at this point, but will contact site staff if he or his wife do have questions at a later time.

## 2023-05-04 ENCOUNTER — PATIENT MESSAGE (OUTPATIENT)
Dept: HEMATOLOGY/ONCOLOGY | Facility: CLINIC | Age: 68
End: 2023-05-04
Payer: MEDICARE

## 2023-05-04 ENCOUNTER — PATIENT MESSAGE (OUTPATIENT)
Dept: ADMINISTRATIVE | Facility: OTHER | Age: 68
End: 2023-05-04
Payer: MEDICARE

## 2023-05-05 ENCOUNTER — PATIENT MESSAGE (OUTPATIENT)
Dept: ADMINISTRATIVE | Facility: OTHER | Age: 68
End: 2023-05-05
Payer: MEDICARE

## 2023-05-05 ENCOUNTER — PATIENT MESSAGE (OUTPATIENT)
Dept: HEMATOLOGY/ONCOLOGY | Facility: CLINIC | Age: 68
End: 2023-05-05
Payer: MEDICARE

## 2023-05-06 ENCOUNTER — PATIENT MESSAGE (OUTPATIENT)
Dept: ADMINISTRATIVE | Facility: OTHER | Age: 68
End: 2023-05-06
Payer: MEDICARE

## 2023-05-07 ENCOUNTER — PATIENT MESSAGE (OUTPATIENT)
Dept: ADMINISTRATIVE | Facility: OTHER | Age: 68
End: 2023-05-07
Payer: MEDICARE

## 2023-05-08 LAB
FINAL PATHOLOGIC DIAGNOSIS: NORMAL
FROZEN SECTION DIAGNOSIS: NORMAL
GROSS: NORMAL
Lab: NORMAL
SUPPLEMENTAL DIAGNOSIS: NORMAL

## 2023-05-09 ENCOUNTER — PATIENT MESSAGE (OUTPATIENT)
Dept: ADMINISTRATIVE | Facility: OTHER | Age: 68
End: 2023-05-09
Payer: MEDICARE

## 2023-05-10 ENCOUNTER — PATIENT MESSAGE (OUTPATIENT)
Dept: ADMINISTRATIVE | Facility: OTHER | Age: 68
End: 2023-05-10
Payer: MEDICARE

## 2023-05-11 ENCOUNTER — PATIENT MESSAGE (OUTPATIENT)
Dept: ADMINISTRATIVE | Facility: OTHER | Age: 68
End: 2023-05-11
Payer: MEDICARE

## 2023-05-12 ENCOUNTER — PATIENT MESSAGE (OUTPATIENT)
Dept: SURGERY | Facility: CLINIC | Age: 68
End: 2023-05-12
Payer: MEDICARE

## 2023-05-12 ENCOUNTER — PATIENT MESSAGE (OUTPATIENT)
Dept: ADMINISTRATIVE | Facility: OTHER | Age: 68
End: 2023-05-12
Payer: MEDICARE

## 2023-05-13 ENCOUNTER — PATIENT MESSAGE (OUTPATIENT)
Dept: ADMINISTRATIVE | Facility: OTHER | Age: 68
End: 2023-05-13
Payer: MEDICARE

## 2023-05-14 ENCOUNTER — PATIENT MESSAGE (OUTPATIENT)
Dept: ADMINISTRATIVE | Facility: OTHER | Age: 68
End: 2023-05-14
Payer: MEDICARE

## 2023-05-15 ENCOUNTER — OFFICE VISIT (OUTPATIENT)
Dept: INTERNAL MEDICINE | Facility: CLINIC | Age: 68
End: 2023-05-15
Payer: MEDICARE

## 2023-05-15 ENCOUNTER — PATIENT MESSAGE (OUTPATIENT)
Dept: SURGERY | Facility: CLINIC | Age: 68
End: 2023-05-15
Payer: MEDICARE

## 2023-05-15 ENCOUNTER — PATIENT MESSAGE (OUTPATIENT)
Dept: ADMINISTRATIVE | Facility: OTHER | Age: 68
End: 2023-05-15
Payer: MEDICARE

## 2023-05-15 VITALS
DIASTOLIC BLOOD PRESSURE: 53 MMHG | HEART RATE: 73 BPM | BODY MASS INDEX: 28.71 KG/M2 | RESPIRATION RATE: 14 BRPM | HEIGHT: 72 IN | SYSTOLIC BLOOD PRESSURE: 118 MMHG | WEIGHT: 212 LBS

## 2023-05-15 DIAGNOSIS — N40.1 BPH WITH URINARY OBSTRUCTION: ICD-10-CM

## 2023-05-15 DIAGNOSIS — E11.36 DIABETES MELLITUS WITH CATARACT: ICD-10-CM

## 2023-05-15 DIAGNOSIS — N13.8 BPH WITH URINARY OBSTRUCTION: ICD-10-CM

## 2023-05-15 DIAGNOSIS — E66.9 OBESITY, DIABETES, AND HYPERTENSION SYNDROME: ICD-10-CM

## 2023-05-15 DIAGNOSIS — I70.0 AORTIC ATHEROSCLEROSIS: ICD-10-CM

## 2023-05-15 DIAGNOSIS — Z00.00 ENCOUNTER FOR PREVENTIVE HEALTH EXAMINATION: Primary | ICD-10-CM

## 2023-05-15 DIAGNOSIS — Z79.01 CHRONIC ANTICOAGULATION: ICD-10-CM

## 2023-05-15 DIAGNOSIS — I42.8 NON-ISCHEMIC CARDIOMYOPATHY: Chronic | ICD-10-CM

## 2023-05-15 DIAGNOSIS — I15.2 OBESITY, DIABETES, AND HYPERTENSION SYNDROME: ICD-10-CM

## 2023-05-15 DIAGNOSIS — M10.00 IDIOPATHIC GOUT, UNSPECIFIED CHRONICITY, UNSPECIFIED SITE: Chronic | ICD-10-CM

## 2023-05-15 DIAGNOSIS — E11.9 DM TYPE 2 WITHOUT RETINOPATHY: Chronic | ICD-10-CM

## 2023-05-15 DIAGNOSIS — N18.30 CKD STAGE 3 DUE TO TYPE 2 DIABETES MELLITUS: Chronic | ICD-10-CM

## 2023-05-15 DIAGNOSIS — I15.2 HYPERTENSION ASSOCIATED WITH DIABETES: Chronic | ICD-10-CM

## 2023-05-15 DIAGNOSIS — E11.59 OBESITY, DIABETES, AND HYPERTENSION SYNDROME: ICD-10-CM

## 2023-05-15 DIAGNOSIS — E11.22 CKD STAGE 3 DUE TO TYPE 2 DIABETES MELLITUS: Chronic | ICD-10-CM

## 2023-05-15 DIAGNOSIS — E11.59 HYPERTENSION ASSOCIATED WITH DIABETES: Chronic | ICD-10-CM

## 2023-05-15 DIAGNOSIS — I25.10 CORONARY ARTERY DISEASE INVOLVING NATIVE CORONARY ARTERY OF NATIVE HEART WITHOUT ANGINA PECTORIS: Chronic | ICD-10-CM

## 2023-05-15 DIAGNOSIS — E11.69 HYPERLIPIDEMIA ASSOCIATED WITH TYPE 2 DIABETES MELLITUS: Chronic | ICD-10-CM

## 2023-05-15 DIAGNOSIS — E11.22 TYPE 2 DIABETES MELLITUS WITH CHRONIC KIDNEY DISEASE, WITHOUT LONG-TERM CURRENT USE OF INSULIN, UNSPECIFIED CKD STAGE: Chronic | ICD-10-CM

## 2023-05-15 DIAGNOSIS — D84.81 IMMUNODEFICIENCY SECONDARY TO NEOPLASM: ICD-10-CM

## 2023-05-15 DIAGNOSIS — Z95.828 PORT-A-CATH IN PLACE: ICD-10-CM

## 2023-05-15 DIAGNOSIS — C15.9 ESOPHAGEAL ADENOCARCINOMA: Chronic | ICD-10-CM

## 2023-05-15 DIAGNOSIS — I48.0 PAROXYSMAL ATRIAL FIBRILLATION: Chronic | ICD-10-CM

## 2023-05-15 DIAGNOSIS — D49.9 IMMUNODEFICIENCY SECONDARY TO NEOPLASM: ICD-10-CM

## 2023-05-15 DIAGNOSIS — E78.5 HYPERLIPIDEMIA ASSOCIATED WITH TYPE 2 DIABETES MELLITUS: Chronic | ICD-10-CM

## 2023-05-15 DIAGNOSIS — I50.20 HFREF (HEART FAILURE WITH REDUCED EJECTION FRACTION): Chronic | ICD-10-CM

## 2023-05-15 DIAGNOSIS — G47.33 OSA ON CPAP: Chronic | ICD-10-CM

## 2023-05-15 DIAGNOSIS — E21.0 HYPERPARATHYROIDISM, PRIMARY: ICD-10-CM

## 2023-05-15 DIAGNOSIS — E11.69 OBESITY, DIABETES, AND HYPERTENSION SYNDROME: ICD-10-CM

## 2023-05-15 PROBLEM — M25.512 CHRONIC LEFT SHOULDER PAIN: Status: RESOLVED | Noted: 2021-11-16 | Resolved: 2023-05-15

## 2023-05-15 PROBLEM — Z79.899 LONG TERM CURRENT USE OF AMIODARONE: Chronic | Status: RESOLVED | Noted: 2022-04-11 | Resolved: 2023-05-15

## 2023-05-15 PROBLEM — G89.29 CHRONIC LEFT SHOULDER PAIN: Status: RESOLVED | Noted: 2021-11-16 | Resolved: 2023-05-15

## 2023-05-15 PROCEDURE — 99215 OFFICE O/P EST HI 40 MIN: CPT | Mod: PBBFAC,PO | Performed by: NURSE PRACTITIONER

## 2023-05-15 PROCEDURE — G0439 PR MEDICARE ANNUAL WELLNESS SUBSEQUENT VISIT: ICD-10-PCS | Mod: ,,, | Performed by: NURSE PRACTITIONER

## 2023-05-15 PROCEDURE — G0439 PPPS, SUBSEQ VISIT: HCPCS | Mod: ,,, | Performed by: NURSE PRACTITIONER

## 2023-05-15 PROCEDURE — 99999 PR PBB SHADOW E&M-EST. PATIENT-LVL V: CPT | Mod: PBBFAC,,,

## 2023-05-15 PROCEDURE — 99999 PR PBB SHADOW E&M-EST. PATIENT-LVL V: ICD-10-PCS | Mod: PBBFAC,,,

## 2023-05-15 NOTE — PATIENT INSTRUCTIONS
Counseling and Referral of Other Preventative  (Italic type indicates deductible and co-insurance are waived)    Patient Name: Lukasz Person  Today's Date: 5/15/2023    Health Maintenance         Date Due Completion Date    Shingles Vaccine (1 of 2) Check w oncology   ---    Colorectal Cancer Screening Already schedule for schedule this month- check w PCP & oncology   ---    Abdominal Aortic Aneurysm Screening Already done   ---    COVID-19 Vaccine (4 - Booster for Moderna series) Check w oncology 11/4/2021    Diabetes Urine Screening Due-orders in epic by PCP   5/2/2022    Foot Exam 06/07/2023 6/7/2022    Hemoglobin A1c 09/14/2023 3/14/2023    Lipid Panel 11/07/2023 11/7/2022    Eye Exam 02/09/2024 2/9/2023    High Dose Statin 04/30/2024 4/30/2023    TETANUS VACCINE    ABnormal nutrition screen- follow up with oncology             10/13/2030 10/13/2020          No orders of the defined types were placed in this encounter.      The following information is provided to all patients.  This information is to help you find resources for any of the problems found today that may be affecting your health:                Living healthy guide: www.[facility].[place].gov      Understanding Diabetes: www.diabetes.org      Eating healthy: www.cdc.gov/healthyweight      CDC home safety checklist: www.cdc.gov/steadi/patient.html      Agency on Aging: www.goea.[place].[facility]      Alcoholics anonymous (AA): www.aa.org      Physical Activity: www.josselyn.nih.gov/ic3miuj      Tobacco use: www.quitwithusla.org

## 2023-05-15 NOTE — PROGRESS NOTES
Lukasz Person presented for a  Medicare AWV and comprehensive Health Risk Assessment today. The following components were reviewed and updated:    Medical history  Family History  Social history  Allergies and Current Medications  Health Risk Assessment  Health Maintenance  Care Team     ** See Completed Assessments for Annual Wellness Visit within the encounter summary.**       The following assessments were completed:  Living Situation  CAGE  Depression Screening  Timed Get Up and Go  Whisper Test  Cognitive Function Screening  Nutrition Screening  ADL Screening  PAQ Screening      Vitals:    05/15/23 0911   BP: (!) 118/53   BP Location: Left arm   Patient Position: Sitting   Pulse: 73   Resp: 14   Weight: 96.2 kg (211 lb 15.6 oz)   Height: 6' (1.829 m)     Body mass index is 28.75 kg/m².  Physical Exam  Constitutional:       Comments: Younger in appearance than age   HENT:      Right Ear: There is no impacted cerumen.      Left Ear: There is no impacted cerumen.   Eyes:      General: No scleral icterus.  Cardiovascular:      Rate and Rhythm: Normal rate and regular rhythm.   Pulmonary:      Effort: Pulmonary effort is normal.      Breath sounds: Normal breath sounds.   Abdominal:      Palpations: Abdomen is soft.   Musculoskeletal:         General: No swelling.   Skin:     General: Skin is warm and dry.   Neurological:      Mental Status: He is alert and oriented to person, place, and time.   Psychiatric:         Mood and Affect: Mood normal.         Behavior: Behavior normal.         Thought Content: Thought content normal.         Judgment: Judgment normal.          Medication review & Opioid screening performed during rooming section. No prescribed opioid medications noted.  Review for substance use disorder screening performed during rooming section. No substance abuse noted on screening.      Diagnoses and health risks identified today and associated recommendations/orders:    1. Esophageal  adenocarcinoma  Stable followed by hematology-onc, radiation    2. Type 2 diabetes mellitus with chronic kidney disease, without long-term current use of insulin, unspecified CKD stage  Stable followed by PCP    3. Idiopathic gout, unspecified chronicity, unspecified site  Stable followed by PCP    4. Chronic anticoagulation  Stable followed by cardiology    5. Aortic atherosclerosis  Stable followed by cardiology    6. Immunodeficiency secondary to neoplasm  Stable followed by hematology-onc, radiation    7. BPH with urinary obstruction  Stable followed by PCP    8. Hyperparathyroidism, primary  Stable followed by PCP    9. Obesity, diabetes, and hypertension syndrome  Stable followed by PCP    10. Non-ischemic cardiomyopathy  Stable followed by cardiology    11. CKD stage 3 due to type 2 diabetes mellitus  Stable followed by PCP    12. HFrEF (heart failure with reduced ejection fraction)  Stable followed by cardiology      13. DM type 2 without retinopathy  Stable followed by ophth    14. Hyperlipidemia associated with type 2 diabetes mellitus  Stable followed by cardiology    15. Hypertension associated with diabetes  Stable followed by cardiology      16. KUMAR on CPAP  Stable followed by sleep medicine    17. Encounter for preventive health examination  Here for Health Risk Assessment/Annual Wellness Visit.  Health maintenance reviewed and updated. Follow up in one year.        18. Coronary artery disease involving native coronary artery of native heart without angina pectoris  Stable followed by cardiology    19. Paroxysmal atrial fibrillation  Stable followed by cardiology      20. Diabetes mellitus with cataract  Stable followed by ophth    21. Port a cath in place  Stable followed by hematology-oncology    Provided Lukasz with a 5-10 year written screening schedule and personal prevention plan. Recommendations were developed using the USPSTF age appropriate recommendations. Education, counseling, and  referrals were provided as needed. After Visit Summary printed and given to patient which includes a list of additional screenings\tests needed. ABnormal nutrition screen- follow up with oncology.Follow up in about 1 year (around 5/15/2024) for HRA.    MARYANN Mustafa offered to discuss advanced care planning, including how to pick a person who would make decisions for you if you were unable to make them for yourself, called a health care power of , and what kind of decisions you might make such as use of life sustaining treatments such as ventilators and tube feeding when faced with a life limiting illness recorded on a living will that they will need to know. (How you want to be cared for as you near the end of your natural life)     X Patient is interested in learning more about how to make advanced directives.  I provided them paperwork and offered to discuss this with them.

## 2023-05-16 ENCOUNTER — PATIENT MESSAGE (OUTPATIENT)
Dept: ADMINISTRATIVE | Facility: OTHER | Age: 68
End: 2023-05-16
Payer: MEDICARE

## 2023-05-17 ENCOUNTER — PATIENT MESSAGE (OUTPATIENT)
Dept: ADMINISTRATIVE | Facility: OTHER | Age: 68
End: 2023-05-17
Payer: MEDICARE

## 2023-05-18 ENCOUNTER — PATIENT MESSAGE (OUTPATIENT)
Dept: ADMINISTRATIVE | Facility: OTHER | Age: 68
End: 2023-05-18
Payer: MEDICARE

## 2023-05-19 ENCOUNTER — PATIENT MESSAGE (OUTPATIENT)
Dept: ADMINISTRATIVE | Facility: OTHER | Age: 68
End: 2023-05-19
Payer: MEDICARE

## 2023-05-20 ENCOUNTER — PATIENT MESSAGE (OUTPATIENT)
Dept: ADMINISTRATIVE | Facility: OTHER | Age: 68
End: 2023-05-20
Payer: MEDICARE

## 2023-05-21 ENCOUNTER — PATIENT MESSAGE (OUTPATIENT)
Dept: ADMINISTRATIVE | Facility: OTHER | Age: 68
End: 2023-05-21
Payer: MEDICARE

## 2023-05-22 ENCOUNTER — PATIENT MESSAGE (OUTPATIENT)
Dept: ADMINISTRATIVE | Facility: OTHER | Age: 68
End: 2023-05-22
Payer: MEDICARE

## 2023-05-22 ENCOUNTER — OFFICE VISIT (OUTPATIENT)
Dept: OTOLARYNGOLOGY | Facility: CLINIC | Age: 68
End: 2023-05-22
Payer: MEDICARE

## 2023-05-22 VITALS — DIASTOLIC BLOOD PRESSURE: 58 MMHG | SYSTOLIC BLOOD PRESSURE: 91 MMHG | HEART RATE: 76 BPM

## 2023-05-22 DIAGNOSIS — R49.0 DYSPHONIA: Primary | ICD-10-CM

## 2023-05-22 DIAGNOSIS — J38.01 UNILATERAL COMPLETE VOCAL FOLD PARALYSIS: ICD-10-CM

## 2023-05-22 PROCEDURE — 99213 OFFICE O/P EST LOW 20 MIN: CPT | Mod: 25,S$PBB,, | Performed by: OTOLARYNGOLOGY

## 2023-05-22 PROCEDURE — 99999 PR PBB SHADOW E&M-EST. PATIENT-LVL IV: ICD-10-PCS | Mod: PBBFAC,,, | Performed by: OTOLARYNGOLOGY

## 2023-05-22 PROCEDURE — 99213 PR OFFICE/OUTPT VISIT, EST, LEVL III, 20-29 MIN: ICD-10-PCS | Mod: 25,S$PBB,, | Performed by: OTOLARYNGOLOGY

## 2023-05-22 PROCEDURE — 31579 LARYNGOSCOPY TELESCOPIC: CPT | Mod: S$PBB,,, | Performed by: OTOLARYNGOLOGY

## 2023-05-22 PROCEDURE — 31579 PR LARYNGOSCOPY, FLEX/RIGID TELESCOPIC, W/STROBOSCOPY: ICD-10-PCS | Mod: S$PBB,,, | Performed by: OTOLARYNGOLOGY

## 2023-05-22 PROCEDURE — 99999 PR PBB SHADOW E&M-EST. PATIENT-LVL IV: CPT | Mod: PBBFAC,,, | Performed by: OTOLARYNGOLOGY

## 2023-05-22 PROCEDURE — 31579 LARYNGOSCOPY TELESCOPIC: CPT | Mod: PBBFAC | Performed by: OTOLARYNGOLOGY

## 2023-05-22 PROCEDURE — 99214 OFFICE O/P EST MOD 30 MIN: CPT | Mod: PBBFAC | Performed by: OTOLARYNGOLOGY

## 2023-05-22 NOTE — PATIENT INSTRUCTIONS
Your vocal cord is starting to move  Talk to your surgeon about possibly getting a swallow study  Call with questions

## 2023-05-22 NOTE — Clinical Note
This man is doing fairly well.  His vocal cord is starting to move already.  He is having some dysphagia symptoms.  I suggested that he follow up with you to discuss whether an esophagram might be beneficial.  I will reassess his vocal fold motion in a few months.

## 2023-05-22 NOTE — PROGRESS NOTES
[facility]  Department of Otorhinolaryngology and Communication Sciences    Subjective:      Lukasz Person is a 68 y.o. male who presents for follow-up. 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    3/17/2023 - SML left VF injection aug (Dr. Altman) - ALVARADO    The patient reports that his voice is stable since his discharge from the hospital.  He is on a full diet p.o. he reports his voice remains weak and effortful.  There are times, in the morning, where his voice comes out normally.  He has difficulty with p.o. intake which is multifactorial, including poor appetite, dysgeusia, hyperactive gag, and obstructive dysphagia which is worst with large solid food boluses.  He also characterizes difficulty tolerating his oral salivary secretions and is prone to spit them out instead of swallowing them.  He is still losing some weight since leaving the hospital.  He also complains of constant globus.    The patient's medications, allergies, past medical, surgical, social and family histories were reviewed and updated as appropriate.    A detailed review of systems was obtained with pertinent positives as per the above HPI, and otherwise negative.      Objective:     BP (!) 91/58 (Patient Position: Sitting)   Pulse 76    Constitutional: comfortable, well dressed  Psychiatric: appropriate affect  Respiratory: comfortably breathing, symmetric chest rise, no stridor  Voice:  Mild variable breathiness  Head: normocephalic  Eyes: conjunctivae and sclerae clear  Indirect laryngoscopy is limited due to gag    Procedure  Flexible Laryngeal Videostroboscopy (11712): Laryngeal videostroboscopy is indicated to assess laryngeal vibratory biomechanics and vocal fold oscillation, which cannot be assessed with a plain light examination. This was carried out transnasally with a distal chip videoendoscope. After verbal consent was obtained, the patient was positioned and the nose was topically  decongested with 1% phenylephrine and topically anesthetized with 4% lidocaine. The endoscope was passed through the most patent nasal cavity and positioned to image the nasopharynx, larynx, and hypopharynx in detail. The following features were examined: nasopharyngeal, laryngeal, hypopharyngeal masses; velopharyngeal strength, closure, and symmetry of motion; vocal fold range and symmetry of motion; laryngeal mucosal edema, erythema, inflammation, and hydration; salivary pooling; and gross laryngeal sensation. During phonation, the vocal folds were assessed for glottal closure; mucosal wave; vocal fold lesions; vibratory periodicity, amplitude, and phase symmetry; and vertical height match. The equipment was removed. The patient tolerated the procedure well without complication. All findings were normal except:  - mild left vocal fold motion impairment; resting paramedian, with intact adduction and abduction function; minimal bowing of free edge with evidence of residual volume of injectate  - complete closure, pliability intact, with blunting of mucosal wave amplitudes    Assessment:     Lukasz Person is a 68 y.o. male with left vocal fold motion impairment following esophageal surgery in 03/2023.  Today he demonstrates interval recovery of function of his left vocal fold, in addition to residual injectate from an injection augmentation procedure.  As a consequence thereof, he demonstrates favorable glottic closure.    He also has complaints of obstructive dysphagia.     Plan:     Reassurance was provided to him regarding the favorable recovery that his larynx has made thus far.  I am optimistic for continued recovery of function.    I recommend that he follow up his surgeon regarding his dysphagia symptoms.  He might benefit from an esophagram.    He will follow up with me for reassessment of his vocal fold motion in 3-4 months, or sooner if needed.    All questions were answered, and the patient is in agreement  with the plan.    Giovanny Valdivia M.D.  [facility]  Department of Otorhinolaryngology and Communication Sciences

## 2023-05-22 NOTE — LETTER
May 22, 2023        Santana Brown Jr., MD  [address] [facility]  5th Floor  [facility] [address]             [facility] [address] [place], [place] [address] [facility] [address]  Phone: [phone]  Fax: [phone]   Patient: Lukasz Person   MR Number: [id number]   YOB: 1955   Date of Visit: 5/22/2023       Dear Dr. Brown:    Thank you for referring Lukasz Person to me for evaluation. Below are the relevant portions of my assessment and plan of care.            If you have questions, please do not hesitate to call me. I look forward to following Lukasz along with you.    Sincerely,      Giovanny Valdivia MD           CC    No Recipients

## 2023-05-26 ENCOUNTER — PATIENT MESSAGE (OUTPATIENT)
Dept: SURGERY | Facility: CLINIC | Age: 68
End: 2023-05-26
Payer: MEDICARE

## 2023-05-29 ENCOUNTER — RESEARCH ENCOUNTER (OUTPATIENT)
Dept: RESEARCH | Facility: HOSPITAL | Age: 68
End: 2023-05-29
Payer: MEDICARE

## 2023-05-29 ENCOUNTER — OFFICE VISIT (OUTPATIENT)
Dept: HEMATOLOGY/ONCOLOGY | Facility: CLINIC | Age: 68
End: 2023-05-29
Payer: MEDICARE

## 2023-05-29 ENCOUNTER — PATIENT MESSAGE (OUTPATIENT)
Dept: ADMINISTRATIVE | Facility: OTHER | Age: 68
End: 2023-05-29
Payer: MEDICARE

## 2023-05-29 ENCOUNTER — OFFICE VISIT (OUTPATIENT)
Dept: SURGERY | Facility: CLINIC | Age: 68
End: 2023-05-29
Payer: MEDICARE

## 2023-05-29 ENCOUNTER — DOCUMENTATION ONLY (OUTPATIENT)
Dept: SURGERY | Facility: CLINIC | Age: 68
End: 2023-05-29
Payer: MEDICARE

## 2023-05-29 ENCOUNTER — INFUSION (OUTPATIENT)
Dept: INFUSION THERAPY | Facility: HOSPITAL | Age: 68
End: 2023-05-29
Payer: MEDICARE

## 2023-05-29 VITALS
HEART RATE: 55 BPM | SYSTOLIC BLOOD PRESSURE: 87 MMHG | HEIGHT: 72 IN | OXYGEN SATURATION: 98 % | BODY MASS INDEX: 27.29 KG/M2 | DIASTOLIC BLOOD PRESSURE: 51 MMHG | WEIGHT: 201.5 LBS

## 2023-05-29 VITALS
DIASTOLIC BLOOD PRESSURE: 59 MMHG | SYSTOLIC BLOOD PRESSURE: 121 MMHG | BODY MASS INDEX: 27.33 KG/M2 | RESPIRATION RATE: 18 BRPM | HEART RATE: 51 BPM | TEMPERATURE: 98 F | WEIGHT: 201.5 LBS

## 2023-05-29 DIAGNOSIS — C15.9 ESOPHAGEAL ADENOCARCINOMA: Primary | ICD-10-CM

## 2023-05-29 DIAGNOSIS — K04.7 DENTAL INFECTION: ICD-10-CM

## 2023-05-29 DIAGNOSIS — Z79.899 ON ANTINEOPLASTIC CHEMOTHERAPY: ICD-10-CM

## 2023-05-29 DIAGNOSIS — Z00.6 CLINICAL TRIAL PARTICIPANT: ICD-10-CM

## 2023-05-29 DIAGNOSIS — I25.10 CORONARY ARTERY DISEASE INVOLVING NATIVE CORONARY ARTERY OF NATIVE HEART WITHOUT ANGINA PECTORIS: ICD-10-CM

## 2023-05-29 DIAGNOSIS — E11.59 HYPERTENSION ASSOCIATED WITH DIABETES: ICD-10-CM

## 2023-05-29 DIAGNOSIS — I50.20 HFREF (HEART FAILURE WITH REDUCED EJECTION FRACTION): ICD-10-CM

## 2023-05-29 DIAGNOSIS — I15.2 HYPERTENSION ASSOCIATED WITH DIABETES: ICD-10-CM

## 2023-05-29 DIAGNOSIS — N18.30 TYPE 2 DIABETES MELLITUS WITH STAGE 3 CHRONIC KIDNEY DISEASE, WITHOUT LONG-TERM CURRENT USE OF INSULIN, UNSPECIFIED WHETHER STAGE 3A OR 3B CKD: ICD-10-CM

## 2023-05-29 DIAGNOSIS — E11.22 TYPE 2 DIABETES MELLITUS WITH STAGE 3A CHRONIC KIDNEY DISEASE, WITHOUT LONG-TERM CURRENT USE OF INSULIN: ICD-10-CM

## 2023-05-29 DIAGNOSIS — E11.22 CKD STAGE 3 DUE TO TYPE 2 DIABETES MELLITUS: ICD-10-CM

## 2023-05-29 DIAGNOSIS — I48.0 PAROXYSMAL ATRIAL FIBRILLATION: ICD-10-CM

## 2023-05-29 DIAGNOSIS — E11.69 HYPERLIPIDEMIA ASSOCIATED WITH TYPE 2 DIABETES MELLITUS: ICD-10-CM

## 2023-05-29 DIAGNOSIS — N18.31 TYPE 2 DIABETES MELLITUS WITH STAGE 3A CHRONIC KIDNEY DISEASE, WITHOUT LONG-TERM CURRENT USE OF INSULIN: ICD-10-CM

## 2023-05-29 DIAGNOSIS — E78.5 HYPERLIPIDEMIA ASSOCIATED WITH TYPE 2 DIABETES MELLITUS: ICD-10-CM

## 2023-05-29 DIAGNOSIS — E11.22 TYPE 2 DIABETES MELLITUS WITH STAGE 3 CHRONIC KIDNEY DISEASE, WITHOUT LONG-TERM CURRENT USE OF INSULIN, UNSPECIFIED WHETHER STAGE 3A OR 3B CKD: ICD-10-CM

## 2023-05-29 DIAGNOSIS — R63.4 UNINTENTIONAL WEIGHT LOSS: ICD-10-CM

## 2023-05-29 DIAGNOSIS — J38.00 VOCAL CORD PARALYSIS: ICD-10-CM

## 2023-05-29 DIAGNOSIS — N18.30 CKD STAGE 3 DUE TO TYPE 2 DIABETES MELLITUS: ICD-10-CM

## 2023-05-29 PROBLEM — Z00.00 ENCOUNTER FOR PREVENTIVE HEALTH EXAMINATION: Chronic | Status: RESOLVED | Noted: 2023-05-15 | Resolved: 2023-05-29

## 2023-05-29 PROCEDURE — A4216 STERILE WATER/SALINE, 10 ML: HCPCS | Mod: Q1 | Performed by: INTERNAL MEDICINE

## 2023-05-29 PROCEDURE — 25000003 PHARM REV CODE 250: Mod: Q1 | Performed by: INTERNAL MEDICINE

## 2023-05-29 PROCEDURE — 99024 POSTOP FOLLOW-UP VISIT: CPT | Mod: POP,,, | Performed by: NURSE PRACTITIONER

## 2023-05-29 PROCEDURE — 99024 PR POST-OP FOLLOW-UP VISIT: ICD-10-PCS | Mod: POP,,, | Performed by: NURSE PRACTITIONER

## 2023-05-29 PROCEDURE — 99213 OFFICE O/P EST LOW 20 MIN: CPT | Mod: PBBFAC | Performed by: NURSE PRACTITIONER

## 2023-05-29 PROCEDURE — 99999 PR PBB SHADOW E&M-EST. PATIENT-LVL III: ICD-10-PCS | Mod: PBBFAC,,, | Performed by: NURSE PRACTITIONER

## 2023-05-29 PROCEDURE — 99215 PR OFFICE/OUTPT VISIT, EST, LEVL V, 40-54 MIN: ICD-10-PCS | Mod: Q1,S$PBB,, | Performed by: INTERNAL MEDICINE

## 2023-05-29 PROCEDURE — 96413 CHEMO IV INFUSION 1 HR: CPT

## 2023-05-29 PROCEDURE — 63600175 PHARM REV CODE 636 W HCPCS: Performed by: INTERNAL MEDICINE

## 2023-05-29 PROCEDURE — 99215 OFFICE O/P EST HI 40 MIN: CPT | Mod: Q1,S$PBB,, | Performed by: INTERNAL MEDICINE

## 2023-05-29 PROCEDURE — 99999 PR PBB SHADOW E&M-EST. PATIENT-LVL III: CPT | Mod: PBBFAC,,, | Performed by: NURSE PRACTITIONER

## 2023-05-29 RX ORDER — HEPARIN 100 UNIT/ML
500 SYRINGE INTRAVENOUS
Status: DISCONTINUED | OUTPATIENT
Start: 2023-05-29 | End: 2023-05-29 | Stop reason: HOSPADM

## 2023-05-29 RX ORDER — SODIUM CHLORIDE 0.9 % (FLUSH) 0.9 %
10 SYRINGE (ML) INJECTION
Status: CANCELLED | OUTPATIENT
Start: 2023-05-29

## 2023-05-29 RX ORDER — DIPHENHYDRAMINE HYDROCHLORIDE 50 MG/ML
50 INJECTION INTRAMUSCULAR; INTRAVENOUS ONCE AS NEEDED
Status: DISCONTINUED | OUTPATIENT
Start: 2023-05-29 | End: 2023-05-29 | Stop reason: HOSPADM

## 2023-05-29 RX ORDER — DIPHENHYDRAMINE HYDROCHLORIDE 50 MG/ML
50 INJECTION INTRAMUSCULAR; INTRAVENOUS ONCE AS NEEDED
Status: CANCELLED | OUTPATIENT
Start: 2023-05-29

## 2023-05-29 RX ORDER — SODIUM CHLORIDE 0.9 % (FLUSH) 0.9 %
10 SYRINGE (ML) INJECTION
Status: DISCONTINUED | OUTPATIENT
Start: 2023-05-29 | End: 2023-05-29 | Stop reason: HOSPADM

## 2023-05-29 RX ORDER — EPINEPHRINE 0.3 MG/.3ML
0.3 INJECTION SUBCUTANEOUS ONCE AS NEEDED
Status: DISCONTINUED | OUTPATIENT
Start: 2023-05-29 | End: 2023-05-29 | Stop reason: HOSPADM

## 2023-05-29 RX ORDER — EPINEPHRINE 0.3 MG/.3ML
0.3 INJECTION SUBCUTANEOUS ONCE AS NEEDED
Status: CANCELLED | OUTPATIENT
Start: 2023-05-29

## 2023-05-29 RX ORDER — HEPARIN 100 UNIT/ML
500 SYRINGE INTRAVENOUS
Status: CANCELLED | OUTPATIENT
Start: 2023-05-29

## 2023-05-29 RX ADMIN — SODIUM CHLORIDE 1000 ML: 0.9 INJECTION, SOLUTION INTRAVENOUS at 03:05

## 2023-05-29 RX ADMIN — Medication 10 ML: at 04:05

## 2023-05-29 RX ADMIN — HEPARIN 500 UNITS: 100 SYRINGE at 04:05

## 2023-05-29 NOTE — PLAN OF CARE
1629 pt tolerated inv opdivo without issue, pt to rtc 6/27/23, no distress noted upon d/c to home

## 2023-05-29 NOTE — PROGRESS NOTES
Pt is here accompanied by his wife   Pt signed sx consent   Informed pt his sx is luis manuel for Wednesday 5/31/23 sx time 10 am and pt has to be at the hospital @ 8 am to check in. Facility address provided.   Per Dr Brown pt can hold his xarelto 2 days before his sx. Hold 5/29/23 & 5/30/23.    Surgery instruction provided:   Do not to eat or drink after midnight. No chewing gum or sucking candy   PAT Nurse will give pt a call to go thru pt medications list the day before sx date  Take a shower the night before and the morning of the surgery day with antibacterial soap and not to apply powder, deodorant and body lotion after shower  Will need someone to take pt to the hospital and drive pt home after the surgery  Leave all  valuable belongings at home.  Remove all body piercing, denture and contact lens  Wear button front clothing/lose clothing  Post operative instruction given.   Avoid strenuous and vigorous activities.   Pt questions and concerns were addressed. Informed pt if he has further questions and concerns to call our office. Telephone number provided   Pt verbalized understanding all of the above

## 2023-05-29 NOTE — PROGRESS NOTES
MEDICAL ONCOLOGY - ESTABLISHED PATIENT VISIT    Reason for visit: esophageal cancer    Best Contact Phone Number(s): There are no phone numbers on file.     Cancer/Stage/TNM:    Cancer Staging   Esophageal adenocarcinoma  Staging form: Esophagus - Adenocarcinoma, AJCC 8th Edition  - Pathologic stage from 3/28/2023: Stage II (ypT3, pN0, cM0, G2) - Signed by Santana Brown Jr., MD on 3/28/2023       Oncology History   Esophageal adenocarcinoma   12/8/2022 Initial Diagnosis    Esophageal adenocarcinoma     12/21/2022 - 12/21/2022 Chemotherapy    Treatment Summary   Plan Name: OP ESOPHAGEAL PACLITAXEL CARBOPLATIN WEEKLY  Treatment Goal: Curative  Status: Inactive  Start Date:   End Date:   Provider: Miik Medrano MD  Chemotherapy: CARBOplatin (PARAPLATIN) in sodium chloride 0.9% 250 mL chemo infusion, , Intravenous, Clinic/HOD 1 time, 0 of 1 cycle  PACLitaxeL (TAXOL) 50 mg/m2 = 120 mg in sodium chloride 0.9% 250 mL chemo infusion, 50 mg/m2, Intravenous, Clinic/HOD 1 time, 0 of 1 cycle     12/29/2022 - 1/20/2023 Chemotherapy    Treatment Summary   Plan Name: [facility] [id number] ARM B CARBOPLATIN PACLITAXEL NIVOLUMAB  Treatment Goal: Curative  Status: Inactive  Start Date: 12/29/2022  End Date: 1/20/2023  Provider: Miki Medrano MD  Chemotherapy: CARBOplatin (PARAPLATIN) 215 mg in sodium chloride 0.9% 306.5 mL chemo infusion, 215 mg, Intravenous, Clinic/HOD 1 time, 4 of 5 cycles  Administration: 215 mg (12/29/2022), 230 mg (1/5/2023), 265 mg (1/13/2023), 265 mg (1/20/2023)  PACLitaxeL (TAXOL) 50 mg/m2 = 120 mg in sodium chloride 0.9% 250 mL chemo infusion, 50 mg/m2 = 120 mg, Intravenous, Clinic/HOD 1 time, 4 of 5 cycles  Dose modification: 50 mg/m2 (original dose 50 mg/m2, Cycle 4)  Administration: 120 mg (12/29/2022), 120 mg (1/5/2023), 120 mg (1/13/2023), 120 mg (1/20/2023)     12/29/2022 - 2/1/2023 Radiation Therapy    Treating physician: Dr. Javier Moulton    Course: C1 CHEST 2022    Treatment Site Ref. ID  Energy Dose/Fx (Gy) #Fx Dose Correction (Gy) Total Dose (Gy) Start Date End Date Elapsed Days   IM Esophagus [id number] 6X 1.8 23 / 23 0 41.4 12/29/2022 2/1/2023 34        3/17/2023 Surgery    Procedure: Procedure(s) (LRB):  XI ROBOTIC ESOPHAGECTOMY (N/A)  ROBOTIC JEJUNOSTOMY TUBE INSERTION (N/A)      Surgeon(s) and Role:     * Santana Brown Jr., MD - Primary     * Darian Murphy MD - Assisting     Assistance: Due to having no qualified resident during critical portions of the case, Dr. Darian Murphy acted as an assistant.     Pre-Operative Diagnosis: Esophageal adenocarcinoma, distal 1/3     Post-Operative Diagnosis: Same     Pre-Operative Variables:  Stage: T3 N0  Adjacent organ involvement: None  Chemotherapy within 90 Days: Yes  Radiation Therapy within 90 Days: Yes     Comorbidities:  Morbid obesity  Type 2 diabetes mellitus  Obstructive sleep apnea  Gout  Hyperparathyroidism  Hypertension  Severe obesity  Coronary artery disease  Heart failure with reduced ejection fraction    Procedure:  Robotic-assisted Vj three field esophagectomy  Regional mediastinal lymph node dissection  Botox injection of the pylorus  Jejunostomy tube placement     Operative Findings:                No evidence of distant intraperitoneal metastases in the chest or abdomen  Esophageal tumor located in the distal third of the esophagus, mild radiation changes appreciated.  Resection status:  R0 pending final pathology.  No gross disease remaining post dissection.  Frozen sections on proximal and distal margins negative for malignancy  100u Botox injection into the pylorus  Stapled esophagogastrostomy performed at the neck incision after confirmation of negative margins.  Jejunostomy tube placed      3/28/2023 Cancer Staged    Staging form: Esophagus - Adenocarcinoma, AJCC 8th Edition  - Pathologic stage from 3/28/2023: Stage II (ypT3, pN0, cM0, G2)     5/1/2023 - 5/1/2023 Chemotherapy    Treatment Summary   Plan Name: [facility] [id number] ARM  C ADJUVANT NIVOLUMAB  Treatment Goal: Curative  Status: Inactive  Start Date: 5/1/2023  End Date: 5/1/2023  Provider: Miki Medrano MD  Chemotherapy: [No matching medication found in this treatment plan]     5/29/2023 -  Chemotherapy    Treatment Summary   Plan Name: [facility] [id number] ARM C ADJUVANT NIVOLUMAB STEP 2  Treatment Goal: Curative  Status: Active  Start Date: 5/29/2023  End Date: 4/29/2024 (Planned)  Provider: Miki Medrano MD  Chemotherapy: [No matching medication found in this treatment plan]          Interim History:     Mr. Person returns to clinic today prior to cycle 2 of adjuvant nivolumab.  He underwent robotic esophagectomy on 3/14/23 with Dr. Brown and J tube insertion.     J tube has been removed in the interim as he was taking in more PO.  Unfortunately over the last couple week his PO intake has worsened due to dysphagia and most solid foods getting stuck. Has lost 17 pounds.  Has some fatigue but denies pain, nausea, dyspnea or diarrhea.    No other new complaints.     Review of Systems   Constitutional:  Positive for weight loss. Negative for malaise/fatigue.   HENT:  Negative for sore throat.    Eyes:  Negative for blurred vision and double vision.   Respiratory:  Negative for cough and shortness of breath.    Cardiovascular:  Negative for chest pain.   Gastrointestinal:  Negative for abdominal pain and diarrhea.        Dysphagia   Genitourinary:  Negative for frequency.   Musculoskeletal:  Negative for back pain.   Skin:  Negative for rash.   Neurological:  Negative for headaches.   Psychiatric/Behavioral:  The patient is not nervous/anxious.      Past Medical History:   Past Medical History:   Diagnosis Date    Anticoagulant long-term use     Diabetes mellitus     Onset late 50s/early 60s    Hyperlipidemia     Hypertension     Onset late 50s/early 60s    Kidney stones     Sleep apnea     since 2006        Past Surgical History:   Past Surgical History:   Procedure Laterality Date     CORONARY ANGIOGRAPHY N/A 9/30/2020    Procedure: ANGIOGRAM, CORONARY ARTERY;  Surgeon: John West MD;  Location: [facility] CATH LAB;  Service: Cardiology;  Laterality: N/A;    CYSTOSCOPY N/A 2/17/2022    Procedure: CYSTOSCOPY;  Surgeon: Lukasz Hughes MD;  [facility];  Service: Urology;  Laterality: N/A;    CYSTOSCOPY W/ URETERAL STENT PLACEMENT N/A 2/25/2022    Procedure: CYSTOSCOPY, WITH URETERAL STENT INSERTION;  Surgeon: Lukasz Hughes MD;  [facility];  Service: Urology;  Laterality: N/A;    ENDOSCOPIC ULTRASOUND OF UPPER GASTROINTESTINAL TRACT N/A 12/7/2022    Procedure: ULTRASOUND, UPPER GI TRACT, ENDOSCOPIC;  Surgeon: Darian Main MD;  Location: [facility] ENDO;  Service: Endoscopy;  Laterality: N/A;  Approval to hold Xarelto rec'd from Dr. Nick (see t/e 12/5/22)-DS    ESOPHAGOGASTRODUODENOSCOPY N/A 11/17/2022    Procedure: EGD (ESOPHAGOGASTRODUODENOSCOPY);  Surgeon: Brody Gonzales MD;  Location: [facility] (2ND FLR);  Service: Endoscopy;  Laterality: N/A;  inst via email-ok to hold Xarelto x 2 days-MS    LASER LITHOTRIPSY Left 2/25/2022    Procedure: LITHOTRIPSY, USING LASER;  Surgeon: Lukasz Hughes MD;  Location: [facility] OR [address] [place] [address];  Service: Urology;  Laterality: Left;    LEFT HEART CATHETERIZATION Left 9/30/2020    Procedure: Left heart cath;  Surgeon: John West MD;  Location: [facility] CATH LAB;  Service: Cardiology;  Laterality: Left;    LITHOTRIPSY      PARATHYROIDECTOMY  1/1/2-107    PYELOSCOPY Left 2/25/2022    Procedure: PYELOSCOPY;  Surgeon: Lukasz Hughes MD;  [facility];  Service: Urology;  Laterality: Left;    ROBOT-ASSISTED SURGICAL REMOVAL OF ESOPHAGUS USING DA CARLOS XI N/A 3/14/2023    Procedure: XI ROBOTIC ESOPHAGECTOMY;  Surgeon: Santana Brown Jr., MD;  Location: [facility] OR 2ND FLR;  Service: General;  Laterality: N/A;  Abdomen, Chest and Neck    ROBOTIC JEJUNOSTOMY N/A 3/14/2023    Procedure: ROBOTIC JEJUNOSTOMY TUBE  INSERTION;  Surgeon: aSntana Brown Jr., MD;  Location: [facility] OR 2ND FLR;  Service: General;  Laterality: N/A;    TRANSESOPHAGEAL ECHOCARDIOGRAPHY N/A 4/7/2022    Procedure: ECHOCARDIOGRAM, TRANSESOPHAGEAL;  Surgeon: Xander Diagnostic Provider;  Location: [facility] EP LAB;  Service: Cardiology;  Laterality: N/A;    TREATMENT OF CARDIAC ARRHYTHMIA N/A 4/7/2022    Procedure: Cardioversion or Defibrillation;  Surgeon: Iris Fisher NP;  Location: [facility] EP LAB;  Service: Cardiology;  Laterality: N/A;  afib, dccv, artie, anes, EH, 3prep    URETEROSCOPIC REMOVAL OF URETERIC CALCULUS Left 2/25/2022    Procedure: REMOVAL, CALCULUS, URETER, URETEROSCOPIC;  Surgeon: Lukasz Hughes MD;  Location: [facility] OR [facility] FLR;  Service: Urology;  Laterality: Left;    URETEROSCOPY Left 2/25/2022    Procedure: URETEROSCOPY;  Surgeon: Lukasz Hughes MD;  Location: [facility] OR [facility] FLR;  Service: Urology;  Laterality: Left;    VOCAL CORD INJECTION Left 3/17/2023    Procedure: INJECTION, VOCAL CORD, LARYNGOSCOPIC;  Surgeon: Gm Altman MD;  Location: [facility] OR 2ND FLR;  Service: ENT;  Laterality: Left;        Family History:   Family History   Problem Relation Age of Onset    Arthritis Mother     Heart disease Father 70        CABG    Arthritis Father     Diabetes Father     Kidney disease Father         had one kidney removed in early thirties    Arthritis Sister     Arthritis Sister     Hypertension Sister     Colon cancer Brother 32    Arthritis Brother     Alcohol abuse Paternal Aunt     Cancer Maternal Grandfather         throat cancer    Diabetes Paternal Grandmother     Colon polyps Paternal Grandfather     Colon cancer Paternal Grandfather     Cancer Paternal Grandfather         colon cancer at age 62        Social History:   Social History     Tobacco Use    Smoking status: Former     Packs/day: 1.00     Years: 7.00     Pack years: 7.00     Types: Cigarettes, Cigars     Start date: 9/1/1972     Quit date: 1995     Years since  quittin.0     Passive exposure: Never    Smokeless tobacco: Never    Tobacco comments:     smoking was off and on.  cumulative 7 years.  never more than three years in one stretch or more lj   Substance Use Topics    Alcohol use: Not Currently      I have reviewed and updated the patient's past medical, surgical, family and social histories.    Allergies:   Review of patient's allergies indicates:  No Known Allergies     Medications:   Current Outpatient Medications   Medication Sig Dispense Refill    allopurinoL (ZYLOPRIM) 100 MG tablet TAKE 1 TABLET BY MOUTH EVERY DAY 30 tablet 10    blood sugar diagnostic (ACCU-CHEK ANTONELLA PLUS TEST STRP) Strp Use to test CBG four times daily E11.65 400 strip 1    blood-glucose meter kit Checks blood sugars 1x/daily. 1 each 12    lancets (ACCU-CHEK SOFTCLIX LANCETS) Misc Use to test CBG 4 times daily E11.65 400 each 1    lisinopriL-hydrochlorothiazide (PRINZIDE,ZESTORETIC) 10-12.5 mg per tablet Take 1 tablet by mouth once daily. 90 tablet 3    metoprolol succinate (TOPROL-XL) 25 MG 24 hr tablet Take 1 tablet (25 mg total) by mouth once daily. 30 tablet 11    nitroGLYCERIN (NITROSTAT) 0.4 MG SL tablet Place 1 tablet (0.4 mg total) under the tongue every 5 (five) minutes as needed for Chest pain. If you need a third tablet, call 911 60 tablet 12    omeprazole (PRILOSEC) 40 MG capsule Take 1 capsule (40 mg total) by mouth once daily. 90 capsule 3    rivaroxaban (XARELTO) 20 mg Tab Take 1 tablet (20 mg total) by mouth daily with dinner or evening meal. 30 tablet 11    rosuvastatin (CRESTOR) 20 MG tablet TAKE 1 TABLET(20 MG) BY MOUTH EVERY DAY (Patient taking differently: Take 20 mg by mouth every evening.) 30 tablet 11    tamsulosin (FLOMAX) 0.4 mg Cap Take 1 capsule (0.4 mg total) by mouth once daily. (Patient taking differently: Take 0.4 mg by mouth nightly.) 30 capsule 11    testosterone (ANDROGEL) 20.25 mg/1.25 gram (1.62 %) GlPm Apply 4 pumps to shoulders daily 2 each 5      No current facility-administered medications for this visit.     Facility-Administered Medications Ordered in Other Visits   Medication Dose Route Frequency Provider Last Rate Last Admin    alteplase injection 2 mg  2 mg Intra-Catheter PRN Miki Medrano MD        diphenhydrAMINE injection 50 mg  50 mg Intravenous Once PRN Miki Medrano MD        EPINEPHrine (EPIPEN) 0.3 mg/0.3 mL pen injection 0.3 mg  0.3 mg Intramuscular Once PRN Miki Medrano MD        heparin, porcine (PF) 100 unit/mL injection flush 500 Units  500 Units Intravenous PRN Miki Medrano MD        hydrocortisone sodium succinate injection 100 mg  100 mg Intravenous Once PRN Miki Medrano MD        INV nivolumab 480 mg in INV sodium chloride 0.9 % 100 mL chemo infusion  480 mg Intravenous 1 time in Clinic/HOD Miki Medrano MD        sodium chloride 0.9% 250 mL flush bag   Intravenous 1 time in Clinic/HOD Miki Medrano MD        sodium chloride 0.9% bolus 1,000 mL 1,000 mL  1,000 mL Intravenous Once Miki Medrano MD        sodium chloride 0.9% flush 10 mL  10 mL Intravenous PRN Miki Medrano MD            Physical Exam:   There were no vitals taken for this visit.     ECOG Performance status: 0    Physical Exam  Vitals reviewed.   Constitutional:       General: He is not in acute distress.     Appearance: Normal appearance. He is not ill-appearing, toxic-appearing or diaphoretic.   HENT:      Head: Normocephalic and atraumatic.   Eyes:      General: No scleral icterus.     Extraocular Movements: Extraocular movements intact.      Conjunctiva/sclera: Conjunctivae normal.      Pupils: Pupils are equal, round, and reactive to light.   Neck:      Comments: L neck wound well-healing  Cardiovascular:      Rate and Rhythm: Normal rate and regular rhythm.      Heart sounds: Normal heart sounds. No murmur heard.  Pulmonary:      Effort: Pulmonary effort is normal. No respiratory distress.       Breath sounds: Normal breath sounds. No wheezing or rales.   Abdominal:      General: Bowel sounds are normal. There is no distension.      Palpations: Abdomen is soft.      Tenderness: There is no abdominal tenderness.   Musculoskeletal:         General: No swelling.      Cervical back: Normal range of motion.   Lymphadenopathy:      Cervical: No cervical adenopathy.   Skin:     Coloration: Skin is not jaundiced.      Findings: No bruising, erythema or rash.   Neurological:      General: No focal deficit present.      Mental Status: He is alert and oriented to person, place, and time. Mental status is at baseline.      Cranial Nerves: No cranial nerve deficit.      Motor: No weakness.      Gait: Gait normal.   Psychiatric:         Mood and Affect: Mood normal.         Behavior: Behavior normal.         Thought Content: Thought content normal.         Judgment: Judgment normal.       Labs:   Recent Results (from the past 48 hour(s))   CBC W/ AUTO DIFFERENTIAL    Collection Time: 05/29/23 12:50 PM   Result Value Ref Range    WBC 3.45 (L) 3.90 - 12.70 K/uL    RBC 3.68 (L) 4.60 - 6.20 M/uL    Hemoglobin 10.6 (L) 14.0 - 18.0 g/dL    Hematocrit 32.7 (L) 40.0 - 54.0 %    MCV 89 82 - 98 fL    MCH 28.8 27.0 - 31.0 pg    MCHC 32.4 32.0 - 36.0 g/dL    RDW 13.9 11.5 - 14.5 %    Platelets 143 (L) 150 - 450 K/uL    MPV 10.3 9.2 - 12.9 fL    Immature Granulocytes 0.6 (H) 0.0 - 0.5 %    Gran # (ANC) 2.4 1.8 - 7.7 K/uL    Immature Grans (Abs) 0.02 0.00 - 0.04 K/uL    Lymph # 0.5 (L) 1.0 - 4.8 K/uL    Mono # 0.4 0.3 - 1.0 K/uL    Eos # 0.1 0.0 - 0.5 K/uL    Baso # 0.03 0.00 - 0.20 K/uL    nRBC 0 0 /100 WBC    Gran % 70.4 38.0 - 73.0 %    Lymph % 14.5 (L) 18.0 - 48.0 %    Mono % 11.3 4.0 - 15.0 %    Eosinophil % 2.3 0.0 - 8.0 %    Basophil % 0.9 0.0 - 1.9 %    Differential Method Automated    COMPREHENSIVE METABOLIC PANEL    Collection Time: 05/29/23 12:50 PM   Result Value Ref Range    Sodium 137 136 - 145 mmol/L    Potassium 3.4  (L) 3.5 - 5.1 mmol/L    Chloride 99 95 - 110 mmol/L    CO2 26 23 - 29 mmol/L    Glucose 151 (H) 70 - 110 mg/dL    BUN 12 8 - 23 mg/dL    Creatinine 1.2 0.5 - 1.4 mg/dL    Calcium 9.2 8.7 - 10.5 mg/dL    Total Protein 6.6 6.0 - 8.4 g/dL    Albumin 3.8 3.5 - 5.2 g/dL    Total Bilirubin 0.6 0.1 - 1.0 mg/dL    Alkaline Phosphatase 96 55 - 135 U/L    AST 36 10 - 40 U/L    ALT 31 10 - 44 U/L    Anion Gap 12 8 - 16 mmol/L    eGFR >60.0 >60 mL/min/1.73 m^2   Magnesium    Collection Time: 23 12:50 PM   Result Value Ref Range    Magnesium 1.7 1.6 - 2.6 mg/dL   PHOSPHORUS    Collection Time: 23 12:50 PM   Result Value Ref Range    Phosphorus 3.1 2.7 - 4.5 mg/dL   BILIRUBIN, DIRECT    Collection Time: 23 12:50 PM   Result Value Ref Range    Bilirubin, Direct 0.3 0.1 - 0.3 mg/dL       I have reviewed the pertinent labs from 23 which are notable for mild anemia.  Cr 1.2, normal LFTs.    Imagin2022 - EUS  Impression:             - Esophageal mucosal changes consistent with Paredes's esophagus.   - Partially obstructing, malignant esophageal tumor was found in the lower third of the esophagus.   - Normal stomach.   - Normal examined duodenum.   - A mass was found in the lower third of the esophagus. A tissue diagnosis was obtained prior to this exam. This is of adenocarcinoma. This was staged T3 (based on invasion into) N0 Mx by endosonographic criteria.   - No specimens collected.     3/1/22 - PET CT   Impression:     1.  Decreased uptake in persistent distal esophageal thickening suggestive of partial response to therapy.  Previous non hypermetabolic gastrohepatic and perigastric lymph nodes are stable to slightly decreased in size.     2.  No new FDG avid lesions to suggest mixed response to therapy.     3.  Incidental new hypermetabolic gluteal cutaneous thickening, likely benign.  Recommend correlation with history and physical examination.    Path:   3/14/23:  Final Pathologic Diagnosis 1.  LYMPH NODE, LEVEL 7, EXCISION:   One benign lymph node (0/1)   2. TOTAL THORACIC ESOPHAGECTOMY AND PROXIMAL STOMACH:   Adenocarcinoma, moderately differentiated, 3.0 cm   Margins are negative   Tumor invades adventitia   Extensive residual cancer with no evident tumor regression (poor or no   response, score 3)   Eleven benign lymph nodes (0/11)   3. RESIDUAL CONDUIT, EXCISION:   Negative for malignancy   SYNOPTIC REPORT   Procedure - Esophageal gastrectomy   Tumor site - GE Junction   Relationship of tumor to esophagogastric junction - Lesion is central at GE   junction   Tumor size - 3.0 x 3.1cm   Histologic type - Adenocarcinoma   Histologic grade - Moderately differentiated   Microscopic tumor extension - Tumor invades adventitia   Margins - All margins of resection are uninvolved, proximal, distal and   adventitial margins are negative   Treatment effect - Extensive residual cancer with no evident tumor regression   (poor or no response, score 3)   Lymphovascular invasion - Not identified   Pathologic staging - yp T3 N0 Mx   Lymph nodes examined - 12   Lymph nodes involved - 0   Additional pathologic findings - Intestinal metaplasia present   MMR-IHC has been performed on previous biopsy ([id number]) and shows all 4   antibodies intact          Assessment:       1. Esophageal adenocarcinoma    2. Vocal cord paralysis    3. Hypertension associated with diabetes    4. Hyperlipidemia associated with type 2 diabetes mellitus    5. Type 2 diabetes mellitus with stage 3 chronic kidney disease, without long-term current use of insulin, unspecified whether stage 3a or 3b CKD    6. CKD stage 3 due to type 2 diabetes mellitus    7. Coronary artery disease involving native coronary artery of native heart without angina pectoris    8. Paroxysmal atrial fibrillation    9. HFrEF (heart failure with reduced ejection fraction)    10. Dental infection              Plan:     # Esophageal adenocarcinoma   Mr. Person is a pleasant  67 year old male who presents to our clinic for management of esophageal cancer. He initially presented with dysphagia, and further workup confirmed a stage II adenocarcinoma, pMMR. Tumor staged T3N0Mx by endosonographic criteria, JEVON. CT CAP on 12/14/22 confirmed no evidence of metastatic disease.    Previously conversation regarding his diagnosis and treatment options.  Recommended perioperative chemo/radiation per the CROSS trial. Discussed chemoradiation for ~5 weeks with 5 doses of weekly carboplatin and taxol administered. Plan to obtain restaging scans 4-5 weeks after radiation completed.     He was a candidate for a cooperative group trial assessing perioperative immunotherapy treatment. He consented for this study - ECOG-ACRIN [id number]: A Phase II/III Study of Dilcia-operative Nivolumab and Ipilimumab in Patients with Locoregional Esophageal and Gastroesophageal Junction Adenocarcinoma    Previously met with Dr. Santana Brown who agreed he was a surgical candidate.  Previously met with Dr. Javier Moulton who will be treating him with radiation.    Began cycle 1 carboplatin/paclitaxel/nivolumab on trial on 12/29/22.  Received cycle 4 of weekly chemotherapy on trial on 1/20/23.   We held chemotherapy for week 5 because of thrombocytopenia per protocol.    Completed radiation on 2/1/23.    Restaging PET/CT personally reviewed on 3/1/23 shows interval decreased FDG avidity in esophageal tumor, no new sites of disease.  CT CAP 3/2/23 shows stable esophageal thickening.    Underwent robotic esophagectomy on 3/14/23 with Dr. Brown.  Pathology showed negative margins, ypT3 N0 tumor with 0 of 12 lymph nodes involved.  No treatment effect was noted on the tumor tissue.    Discussed that per the [id number] protocol will proceed with randomization to adjuvant nivolumab +/- ipilimumab.  Patient randomized to receive adjuvant Nivolumab, started cycle 1 at 480 mg q4 weeks 5/1/23.  Plan for six months per protocol.    Orders  and labs reviewed.    Proceed with cycle 2 today at 480 mg q4 weeks.  Timeout occurred with me and Maria Esther SORTO.  Orders signed.    Has worsened dysphagia likely from post-surgical stricture.  Will be scheduled for EGD and dilation later this week.  PD-L1 CPS 30.    RTC in 4 weeks.    # Vocal cord paralysis  Post-op. S/p injection by ENT.    # HTN, HLD, DM, CKD  Following with PCP Dr. Wislon and nephrologist Dr. Oconnell.   BP low today, mildly symptomatic with orthostatic symptoms.  Recommended he hold his HCTZ/lisinopril.  Surgery office reached out to Dr. Nick.  Cr stable today.  1L of IVF today in infusion.    # CAD, A fib, CHF  Following with cardiologist Dr. Nick & EP Dr. Phillip.   Hold Xarelto temporarily for EGD.   Continue medical management.     # Dental Infection  Will have another root canal scheduled.  Should not interfere with nivolumab treatment.    Follow up: 4 weeks per research.    Miki Medrano MD  Hematology/Oncology  [facility]    Route Chart for Scheduling    Med Onc Chart Routing      Follow up with physician . Per research in 4 weeks   Follow up with SAMUEL    Infusion scheduling note    Injection scheduling note    Labs    Imaging    Pharmacy appointment    Other referrals           Treatment Plan Information   [facility] [id number] ARM C ADJUVANT NIVOLUMAB STEP 2   Miki Medrano MD   Upcoming Treatment Dates - [facility] [id number] ARM C ADJUVANT NIVOLUMAB STEP 2    6/26/2023       Chemotherapy       INV nivolumab 480 mg in INV sodium chloride 0.9 % 100 mL chemo infusion  7/24/2023       Chemotherapy       INV nivolumab 480 mg in INV sodium chloride 0.9 % 100 mL chemo infusion  8/21/2023       Chemotherapy       INV nivolumab 480 mg in INV sodium chloride 0.9 % 100 mL chemo infusion  9/18/2023       Chemotherapy       INV nivolumab 480 mg in INV sodium chloride 0.9 % 100 mL chemo infusion    Supportive Plan Information  IV FLUIDS AND ELECTROLYTES   Miki SMITH  MD Marcela   Upcoming Treatment Dates - IV FLUIDS AND ELECTROLYTES    No upcoming days in selected categories.    Therapy Plan Information  Flushes  heparin, porcine (PF) 100 unit/mL injection flush 500 Units  500 Units, Intravenous, Every visit  sodium chloride 0.9% flush 10 mL  10 mL, Intravenous, Every visit

## 2023-05-29 NOTE — Clinical Note
Dr. Nick,  Since you last saw this patient in 11/2022, he has been diagnosed and treated for esophageal cancer. After neoadjuvant chemoradiation, he underwent esophagectomy 3/2023. He has lost ~ 60# and unfortunately he is still losing weight. His BP has been running low in clinic and at home, and he is experiencing mild orthostatic hypotension. He is taking lisinopril/ HCT and metoprolol. I asked him to hold lisinopril/ HCT (as he is scheduled for EGD on Wednesday). However, Mr. Person wanted to clarify this with you. So if you agree or recommend a different BP medication change, please let me know and I am happy to rely to Mr. Person.   Thank you,  Syeda Galvan NP

## 2023-05-29 NOTE — Clinical Note
He is scheduled for end of July with you for GI survivorship, however, after removing J tube 5/1/23 he has lost 17#. Scheduled for EGD with dilation this Wed. Wife is asking if you can f/u with him sooner than 7/31/23, concerned he has some food aversions and he refuses to drink protein shakes.

## 2023-05-29 NOTE — PROGRESS NOTES
: URIEL Manriquez MD  Treating Investigators: NIRAV Medrano MD  Surgical Oncologist: SARAH Brown M.D. / Gerri Zhang NP  Radiation Oncologist: Javier Moulton M.D, PhD     Protocol: ECOG-ACRIN [id number]  IRB#: [id number]  Patient ID: [id number]  Treatment: Arm B - Carbo+Taxol+Nivolumab  Treatment: Arm C - Nivolumab Monotherapy         A Phase II/III Study of Dilcia-operative Nivolumab and Ipilimumab in Patients with Locoregional Esophageal and Gastroesophageal Junction Adenocarcinoma     Step 2  C2D1: 29May2023  Patient presents to clinic today accompanied by his wife Faviola for his C2D1 visit. Patient was informed that the IRB approved a new informed consent form, PVD [id number]. CRC reviewed all changes to the informed consent re: Step 2 Nivolumab & Nivolumab/Ipilimumab dosing schedule. Patient is assigned to Arm C, Nivolumab monotherapy, and was informed that dosing schedule changed from 240 mg Q2W (total monthly dose of 480 mg) x 12 cycles to 480 mg Q4W (total monthly dose of 480 mg) x 13 cycles. Patient inquired if the new dosing schedule was accompanied by any new or worsened AEs & MD confirmed there were zero changes to the side effect profile, including the reported AEs, frequency of AEs and severity of AEs. Patient queried & denies any further questions or concerns and verbalized understanding of all information discussed today. Patient continued to provide consent for the optional future use of his samples. Patient & CRC co-signed the ICF, and patient was given a signed copy prior to completion of any study procedures.    Patient saw Syeda Arora with surgery prior to his visit with us today. He reported to Syeda that he feels like some food gets caught in his esophagus, but liquids are okay. Syeda & Dr. Brown (patient's surgeon) scheduled the patient for an EGD with dilation on 31May2023. Patient reports mild dysgeusia & grade 2 dysphagia for the last 3 weeks & continues to report fatigue that  improves with rest, nausea after eating. Patient has lost 17 lbs since 01May2023 when his J-tube was removed. Because of his weight loss, patient reports that his home monitored blood pressures have been low (/ 70-80s, per Syeda Arora notes) & his home monitored glucose levels have also been low (70s - 80s), so has discontinued his Metformin. Messages were sent to patient's providers that manage his blood pressure & DM2 for guidance on his medications. Patient denies any other changes to his medications.     Patient completed C2D1 safety labs, VS, PE & ECOG (ECOG = 0, per Dr. Medrano) today per protocol. Dr. Medrano assessed all patient's labs, VS & PE findings as either WNL or NCS, deeming patient eligible to continue treatment with Nivolumab monotherapy today. Patient's blood pressure in clinic today was 87/51 mmHg, but Dr. Medrano does not believe treatment today will have any effect on his blood pressure. Patient was administered 1L NS prior to his infusion today.  Patient's baseline weight at Step 1 Week 1 was 117.2 kg and his Step 2 C1D1 was 99.2 kg. Today's weight is 91.4 kg, which is >20% weight loss since Week 1 & is <10% from C1D1.      Per protocol, Nivolumab is administered at a 480 mg flat dose & cannot be modified. CRC & Dr. Medrano performed time-out in exam room.      Infusion RN Sommer Nguyen was instructed to administer the treatment per the treatment plan. RN expressed their understanding of all instructions. Patient completed treatment today without event & was DC'd from clinic ambulatory and in stable condition. Please see Infusion RN note for further information.    Patient was encouraged to contact MACARIO or Neva Medrano team with any questions or concerns & was reminded of clinic's 24/7 emergency contact number. Patient verbalized understanding & denies any additional questions at this time.        Step 2  C3D1:  [id number] @ 0940 - Safety labs [facility] 3rd floor  [id number] @ 1000 Mariza Medrano  2nd floor  [id number] @ 1100 - infusion (Nivolumab only)        Solicited AEs -- Assessed 29May2023:  ** Anemia - Grade 1 -- (NCS per MD) -- continued from prior visit  ** Platelet count decreased - Grade 0 -- Grade 1, NCS per MD  ** White blood cell count decreased - Grade 0 -- Grade 1, NCS per MD  Neutrophil count decreased - Grade 0  ** Lymphocyte count decreased - Grade 2   (NCS per MD) -- continued from prior visit  Peripheral motor neuropathy - Grade 0   Peripheral sensory neuropathy - Grade 0   Creatinine increased - Grade 0  Hypothyroidism - Grade 0   (TSH WNL on 11Apr2023)  Diarrhea (patient baseline BM = 2 stools a day) - Grade 0 -- reports loose stools vs. diarrhea   **Fatigue - Grade 2   **Nausea - Grade 1  **Cough - Grade 1  -- Medical History  Pneumonitis - Grade 0  **Hyperglycemia - Grade 1 -- Medical History  -- patient DC'd Metformin x 2 weeks with mild non-fasting glucose elevation on 29May2023 labs (29May2023 HbA1c = 6.2)  Adrenal Insufficiency - Grade 0  Rash-maculo-papular - Grade 0  Pruritis - Grade 0  Erythema multiforme - Grade 0  Vomiting - Grade 0    Lipase increased -- Not required per study calendar and therefore not assessed this visit.      Ongoing Non-Solicited AEs:  Anorexia - Grade 1 - 09Jan2023 - ongoing (no treatment)  Dysgeusia - Grade 1 - notable since surgery 3/14/2023  Generalized weakness - Grade 1 - 21Apr2023 - ongoing    New or Changed AEs Since Last Visit:  Dizziness, int. - Grade 1 - 15May2023 - ongoing  Dysphagia - Grade 2 - 15May2023 - ongoing  Nausea - Grade 1 - 21Apr2023 - ongoing    Insomnia, int. - Grade 1 - 06Jan2023 - ongoing (R/T steroids given during infusion) -- AE ended 25Jan2023       Complete Baseline Medical History, Adverse Events, and Concomitant Medications are located in patient's shadow chart

## 2023-05-29 NOTE — PROGRESS NOTES
"    Post-Op Follow-up Visit:   5/29/2023  Patient ID: Lukasz Person is a 68 y.o. male, born 1955    Chief Complaint   Patient presents with    Post-op Evaluation     1 month f/u     8/2022: PCP for progressive dysphagia  11/17/22: EGD:nodular mucosa in the esophagus at the GE junction. Esophageal mucosal changes suggestive of short-segment Paredes's esophagus. Biopsies positive for adenocarcinoma.   12/7/22: EUS: Partially obstructing, malignant esophageal tumor was found in the lower third of the esophagus staged T3 N0 Mx by endosonographic criteria.   12/8/22: PET CT: Distal esophageal wall thickening and hypermetabolic activity in keeping with known esophageal cancer with no definite evidence of metastatic disease  Neoadj CRT per Dr. Medrano and Kenney  3/14/22: robotic esophagectomy with J tube per Dr. Brown  3/18/2023: s/p injection laryngoplasty to left true vocal fold per Dr. Leal  3/27/23: Esophragram with no evidence of postoperative leak.  5/1/2023: removed J tube  5/29/2023: start adj immunotherapy per Dr. Medrano    Interval History: This 69 y/o gentleman returns to clinic from [facility] with his wife. He was last seen on 5/1/23 with Dr. Brown when J tube removed. He was seen by Dr. Valdivia last week and has an appt with Dr. Medraon later this afternoon prior to infusion.   He is experiencing dysphagia with dysgeusia contributing to decreased appetite with intermittent nausea and occ regurgitation of both solid food and liquids. + abd cramping with diarrhea which he attributes to "eating too much at once." He refuses to drink protein supplements, reporting they all cause diarrhea. Of note, his weight is down 17# since J tube removal. C/o fatigue, although he is walking for exercise and resumed some yardwork. He is monitoring BP at home and reports ranges / 70-80s. Mr. Person reports dizziness if he stands too quickly. Currently on metoprolol and lisinopril/ HCTZ, managed by cardiology  " Park, who last saw him in 11/2022. Remains on Xarelto.  His voice is hoarse today.   Dx with T2DM, managed by Vera Palacios NP, previously on oral DAVIS with metformin but currently off all antihyperglycemic agents.     4/11/2023: CT C/A/P:  Impression:   Postoperative changes of esophagectomy and gastric pull-through in this patient with esophageal adenocarcinoma.  No focal enhancing lesions or wall thickening to suggest local recurrence.   No lymphadenopathy or other evidence of distant metastatic disease.   Fat stranding in the adipose tissue in the left upper quadrant adjacent to the dorothy esophagus.  Nonspecific and may be postoperative in nature.  Attention on follow-up.   Small right effusion.  Irregular opacities left lung base either atelectasis or developing infiltrates.  Correlation with clinical findings.   Probable postprocedural change left kidney, stable.      Chemistry        Component Value Date/Time     05/29/2023 1250    K 3.4 (L) 05/29/2023 1250    CL 99 05/29/2023 1250    CO2 26 05/29/2023 1250    BUN 12 05/29/2023 1250    CREATININE 1.2 05/29/2023 1250     (H) 05/29/2023 1250        Component Value Date/Time    CALCIUM 9.2 05/29/2023 1250    ALKPHOS 96 05/29/2023 1250    AST 36 05/29/2023 1250    ALT 31 05/29/2023 1250    BILITOT 0.6 05/29/2023 1250    ESTGFRAFRICA 54.5 (A) 05/02/2022 0730    EGFRNONAA 47.2 (A) 05/02/2022 0730        Lab Results   Component Value Date    WBC 3.45 (L) 05/29/2023    HGB 10.6 (L) 05/29/2023    HCT 32.7 (L) 05/29/2023    MCV 89 05/29/2023     (L) 05/29/2023       Lab Results   Component Value Date    HGBA1C 6.6 (H) 03/14/2023       Physical Exam:  BP (!) 87/51   Pulse (!) 55   Ht 6' (1.829 m)   Wt 91.4 kg (201 lb 8 oz)   SpO2 98%   BMI 27.33 kg/m²   General:  Non-toxic, ambulatory    Pathology:  Procedure - Esophageal gastrectomy Tumor site - GE Junction   Relationship of tumor to esophagogastric junction - Lesion is central at GE junction    Tumor size - 3.0 x 3.1 cm  Histologic type - Adenocarcinoma   Histologic grade - Moderately differentiated   Microscopic tumor extension - Tumor invades adventitia   Margins - All margins of resection are uninvolved, proximal, distal and adventitial margins are negative   Treatment effect - Extensive residual cancer with no evident tumor regression (poor or no response, score 3)   Lymphovascular invasion - Not identified   Pathologic staging - yp T3 N0 Mx   Lymph nodes examined - 12 Lymph nodes involved - 0   Additional pathologic findings - Intestinal metaplasia present      ICD-10-CM ICD-9-CM    1. Esophageal adenocarcinoma  C15.9 150.9       2. Hypertension associated with diabetes  E11.59 250.80     I15.2 401.9       3. Unintentional weight loss  R63.4 783.21       4. Paroxysmal atrial fibrillation  I48.0 427.31       5. Type 2 diabetes mellitus with stage 3a chronic kidney disease, without long-term current use of insulin  E11.22 250.40     N18.31 585.3       Plan     This 69 y/o gentleman completed neoadj CRT per Dr. Medrano and Dr. Moulton then underwent robotic esophagectomy per Dr. Brown in 3/2023. Path revealed 3.1cm DIVYA, pyT3N0, margins neg and 0 out of 12 LN. He began adj tx with Dr. Medrano on 5/1/23. Since then, he is experiencing dysphagia with weight loss.  Recommend with EGD and dilation - scheduled with Dr. Brown this Wednesday. Reviewed risks/ benefits/ alternatives. He wishes to proceed and consent obtained. Hold Xarelto for 2 days prior. His wife is requesting f/u with dietitian - will schedule after procedure.  Obtain current A1c but agree with holding DM meds for now. Given weight loss and changes to diet, he may not require medication at this time.   Hold lisinopril/ HCT and will discuss BP mgmt with his cardiologist.     Questions were asked and answered to patient and wife's satisfaction.      Pt seen in conjunction with Dr. Brown today.           Syeda Galvan NP  Upper GI /  Hepatobiliary Surgical Oncology  [facility] [place], [place]  Office: [phone]  Fax: [phone]

## 2023-05-29 NOTE — H&P (VIEW-ONLY)
"    Post-Op Follow-up Visit:   5/29/2023  Patient ID: Lukasz Person is a 68 y.o. male, born 1955    Chief Complaint   Patient presents with    Post-op Evaluation     1 month f/u     8/2022: PCP for progressive dysphagia  11/17/22: EGD:nodular mucosa in the esophagus at the GE junction. Esophageal mucosal changes suggestive of short-segment Paredes's esophagus. Biopsies positive for adenocarcinoma.   12/7/22: EUS: Partially obstructing, malignant esophageal tumor was found in the lower third of the esophagus staged T3 N0 Mx by endosonographic criteria.   12/8/22: PET CT: Distal esophageal wall thickening and hypermetabolic activity in keeping with known esophageal cancer with no definite evidence of metastatic disease  Neoadj CRT per Dr. Medrano and Kenney  3/14/22: robotic esophagectomy with J tube per Dr. Brown  3/18/2023: s/p injection laryngoplasty to left true vocal fold per Dr. Leal  3/27/23: Esophragram with no evidence of postoperative leak.  5/1/2023: removed J tube  5/29/2023: start adj immunotherapy per Dr. Medrano    Interval History: This 69 y/o gentleman returns to clinic from [facility] with his wife. He was last seen on 5/1/23 with Dr. Brown when J tube removed. He was seen by Dr. Valdivia last week and has an appt with Dr. Medrano later this afternoon prior to infusion.   He is experiencing dysphagia with dysgeusia contributing to decreased appetite with intermittent nausea and occ regurgitation of both solid food and liquids. + abd cramping with diarrhea which he attributes to "eating too much at once." He refuses to drink protein supplements, reporting they all cause diarrhea. Of note, his weight is down 17# since J tube removal. C/o fatigue, although he is walking for exercise and resumed some yardwork. He is monitoring BP at home and reports ranges / 70-80s. Mr. Person reports dizziness if he stands too quickly. Currently on metoprolol and lisinopril/ HCTZ, managed by cardiology  " Park, who last saw him in 11/2022. Remains on Xarelto.  His voice is hoarse today.   Dx with T2DM, managed by Vera Palacios NP, previously on oral DAVIS with metformin but currently off all antihyperglycemic agents.     4/11/2023: CT C/A/P:  Impression:   Postoperative changes of esophagectomy and gastric pull-through in this patient with esophageal adenocarcinoma.  No focal enhancing lesions or wall thickening to suggest local recurrence.   No lymphadenopathy or other evidence of distant metastatic disease.   Fat stranding in the adipose tissue in the left upper quadrant adjacent to the dorothy esophagus.  Nonspecific and may be postoperative in nature.  Attention on follow-up.   Small right effusion.  Irregular opacities left lung base either atelectasis or developing infiltrates.  Correlation with clinical findings.   Probable postprocedural change left kidney, stable.      Chemistry        Component Value Date/Time     05/29/2023 1250    K 3.4 (L) 05/29/2023 1250    CL 99 05/29/2023 1250    CO2 26 05/29/2023 1250    BUN 12 05/29/2023 1250    CREATININE 1.2 05/29/2023 1250     (H) 05/29/2023 1250        Component Value Date/Time    CALCIUM 9.2 05/29/2023 1250    ALKPHOS 96 05/29/2023 1250    AST 36 05/29/2023 1250    ALT 31 05/29/2023 1250    BILITOT 0.6 05/29/2023 1250    ESTGFRAFRICA 54.5 (A) 05/02/2022 0730    EGFRNONAA 47.2 (A) 05/02/2022 0730        Lab Results   Component Value Date    WBC 3.45 (L) 05/29/2023    HGB 10.6 (L) 05/29/2023    HCT 32.7 (L) 05/29/2023    MCV 89 05/29/2023     (L) 05/29/2023       Lab Results   Component Value Date    HGBA1C 6.6 (H) 03/14/2023       Physical Exam:  BP (!) 87/51   Pulse (!) 55   Ht 6' (1.829 m)   Wt 91.4 kg (201 lb 8 oz)   SpO2 98%   BMI 27.33 kg/m²   General:  Non-toxic, ambulatory    Pathology:  Procedure - Esophageal gastrectomy Tumor site - GE Junction   Relationship of tumor to esophagogastric junction - Lesion is central at GE junction    Tumor size - 3.0 x 3.1 cm  Histologic type - Adenocarcinoma   Histologic grade - Moderately differentiated   Microscopic tumor extension - Tumor invades adventitia   Margins - All margins of resection are uninvolved, proximal, distal and adventitial margins are negative   Treatment effect - Extensive residual cancer with no evident tumor regression (poor or no response, score 3)   Lymphovascular invasion - Not identified   Pathologic staging - yp T3 N0 Mx   Lymph nodes examined - 12 Lymph nodes involved - 0   Additional pathologic findings - Intestinal metaplasia present      ICD-10-CM ICD-9-CM    1. Esophageal adenocarcinoma  C15.9 150.9       2. Hypertension associated with diabetes  E11.59 250.80     I15.2 401.9       3. Unintentional weight loss  R63.4 783.21       4. Paroxysmal atrial fibrillation  I48.0 427.31       5. Type 2 diabetes mellitus with stage 3a chronic kidney disease, without long-term current use of insulin  E11.22 250.40     N18.31 585.3       Plan     This 67 y/o gentleman completed neoadj CRT per Dr. Medrano and Dr. Moulton then underwent robotic esophagectomy per Dr. Brown in 3/2023. Path revealed 3.1cm DIVYA, pyT3N0, margins neg and 0 out of 12 LN. He began adj tx with Dr. Medrano on 5/1/23. Since then, he is experiencing dysphagia with weight loss.  Recommend with EGD and dilation - scheduled with Dr. Brown this Wednesday. Reviewed risks/ benefits/ alternatives. He wishes to proceed and consent obtained. Hold Xarelto for 2 days prior. His wife is requesting f/u with dietitian - will schedule after procedure.  Obtain current A1c but agree with holding DM meds for now. Given weight loss and changes to diet, he may not require medication at this time.   Hold lisinopril/ HCT and will discuss BP mgmt with his cardiologist.     Questions were asked and answered to patient and wife's satisfaction.      Pt seen in conjunction with Dr. Brown today.           Syeda Galvan NP  Upper GI /  Hepatobiliary Surgical Oncology  [facility] [place], [place]  Office: [phone]  Fax: [phone]      10-Feb-2018 19:47

## 2023-05-29 NOTE — PLAN OF CARE
1515 pt here for [id number] trialinfusion and 1 liter normal slaine, hx, meds, allergies reviewed, pt with no new complaints at this time, reclined in chair, continue to monitor

## 2023-05-30 ENCOUNTER — PATIENT MESSAGE (OUTPATIENT)
Dept: ADMINISTRATIVE | Facility: OTHER | Age: 68
End: 2023-05-30
Payer: MEDICARE

## 2023-05-30 ENCOUNTER — CLINICAL SUPPORT (OUTPATIENT)
Dept: ENDOSCOPY | Facility: HOSPITAL | Age: 68
End: 2023-05-30
Attending: INTERNAL MEDICINE
Payer: MEDICARE

## 2023-05-30 ENCOUNTER — DOCUMENT SCAN (OUTPATIENT)
Dept: HOME HEALTH SERVICES | Facility: HOSPITAL | Age: 68
End: 2023-05-30
Payer: MEDICARE

## 2023-05-30 ENCOUNTER — TELEPHONE (OUTPATIENT)
Dept: ENDOSCOPY | Facility: HOSPITAL | Age: 68
End: 2023-05-30

## 2023-05-30 ENCOUNTER — TELEPHONE (OUTPATIENT)
Dept: SURGERY | Facility: CLINIC | Age: 68
End: 2023-05-30
Payer: MEDICARE

## 2023-05-30 DIAGNOSIS — C15.9 ESOPHAGEAL ADENOCARCINOMA: Primary | ICD-10-CM

## 2023-05-30 DIAGNOSIS — K22.2 ESOPHAGEAL STRICTURE: ICD-10-CM

## 2023-05-30 DIAGNOSIS — Z12.11 SCREEN FOR COLON CANCER: ICD-10-CM

## 2023-05-30 NOTE — PLAN OF CARE
Request for Xarelto hold and cardiac clearance sent to Dr RADHA Nick. New PAT appt scheduled. Pt verbalized understanding.   Lip Wedge Excision Repair Text: Given the location of the defect and the proximity to free margins a full thickness wedge repair was deemed most appropriate.  Using a sterile surgical marker, the appropriate repair was drawn incorporating the defect and placing the expected incisions perpendicular to the vermilion border.  The vermilion border was also meticulously outlined to ensure appropriate reapproximation during the repair.  The area thus outlined was incised through and through with a #15 scalpel blade.  The muscularis and dermis were reaproximated with deep sutures following hemostasis. Care was taken to realign the vermilion border before proceeding with the superficial closure.  Once the vermilion was realigned the superfical and mucosal closure was finished.

## 2023-05-30 NOTE — TELEPHONE ENCOUNTER
Emanuel Nick,  Pt has order for a colonoscopy. Can he hold Xarelto x 2 days prior per Endoscopy protocol? Also, is he cleared from a cardiac standpoint to get this procedure done? Please advise.  Thanks.   Gloria HERNANDEZ

## 2023-05-30 NOTE — TELEPHONE ENCOUNTER
Placed call to pt and informed pt his sx is confirmed for Wednesday 5/31/23 sx time 10.05 am and pt has to be at the hospital @ 8 am to check in. Pt states he knows where to go and is aware of his pre op instructions.   Informed pt to call our office if he has any questions or concerns. Telephone number provided.

## 2023-05-31 ENCOUNTER — PATIENT MESSAGE (OUTPATIENT)
Dept: INFECTIOUS DISEASES | Facility: CLINIC | Age: 68
End: 2023-05-31
Payer: MEDICARE

## 2023-05-31 ENCOUNTER — ANESTHESIA EVENT (OUTPATIENT)
Dept: SURGERY | Facility: HOSPITAL | Age: 68
End: 2023-05-31
Payer: MEDICARE

## 2023-05-31 ENCOUNTER — HOSPITAL ENCOUNTER (OUTPATIENT)
Facility: HOSPITAL | Age: 68
Discharge: HOME | End: 2023-05-31
Attending: STUDENT IN AN ORGANIZED HEALTH CARE EDUCATION/TRAINING PROGRAM | Admitting: STUDENT IN AN ORGANIZED HEALTH CARE EDUCATION/TRAINING PROGRAM
Payer: MEDICARE

## 2023-05-31 ENCOUNTER — ANESTHESIA (OUTPATIENT)
Dept: SURGERY | Facility: HOSPITAL | Age: 68
End: 2023-05-31
Payer: MEDICARE

## 2023-05-31 VITALS
HEIGHT: 72 IN | WEIGHT: 201 LBS | BODY MASS INDEX: 27.22 KG/M2 | RESPIRATION RATE: 16 BRPM | SYSTOLIC BLOOD PRESSURE: 132 MMHG | OXYGEN SATURATION: 100 % | HEART RATE: 52 BPM | TEMPERATURE: 97 F | DIASTOLIC BLOOD PRESSURE: 57 MMHG

## 2023-05-31 DIAGNOSIS — C15.9 ESOPHAGEAL ADENOCARCINOMA: ICD-10-CM

## 2023-05-31 DIAGNOSIS — K22.2 ESOPHAGEAL STRICTURE: Primary | ICD-10-CM

## 2023-05-31 LAB
POCT GLUCOSE: 115 MG/DL (ref 70–110)
POCT GLUCOSE: 98 MG/DL (ref 70–110)

## 2023-05-31 PROCEDURE — 37000009 HC ANESTHESIA EA ADD 15 MINS: Performed by: STUDENT IN AN ORGANIZED HEALTH CARE EDUCATION/TRAINING PROGRAM

## 2023-05-31 PROCEDURE — 82962 GLUCOSE BLOOD TEST: CPT | Performed by: STUDENT IN AN ORGANIZED HEALTH CARE EDUCATION/TRAINING PROGRAM

## 2023-05-31 PROCEDURE — 71000044 HC DOSC ROUTINE RECOVERY FIRST HOUR: Performed by: STUDENT IN AN ORGANIZED HEALTH CARE EDUCATION/TRAINING PROGRAM

## 2023-05-31 PROCEDURE — 36000707: Performed by: STUDENT IN AN ORGANIZED HEALTH CARE EDUCATION/TRAINING PROGRAM

## 2023-05-31 PROCEDURE — 63600175 PHARM REV CODE 636 W HCPCS: Performed by: NURSE ANESTHETIST, CERTIFIED REGISTERED

## 2023-05-31 PROCEDURE — D9220A PRA ANESTHESIA: Mod: ANES,,, | Performed by: STUDENT IN AN ORGANIZED HEALTH CARE EDUCATION/TRAINING PROGRAM

## 2023-05-31 PROCEDURE — 36000706: Performed by: STUDENT IN AN ORGANIZED HEALTH CARE EDUCATION/TRAINING PROGRAM

## 2023-05-31 PROCEDURE — C1726 CATH, BAL DIL, NON-VASCULAR: HCPCS | Performed by: STUDENT IN AN ORGANIZED HEALTH CARE EDUCATION/TRAINING PROGRAM

## 2023-05-31 PROCEDURE — D9220A PRA ANESTHESIA: ICD-10-PCS | Mod: CRNA,,, | Performed by: NURSE ANESTHETIST, CERTIFIED REGISTERED

## 2023-05-31 PROCEDURE — 71000033 HC RECOVERY, INTIAL HOUR: Performed by: STUDENT IN AN ORGANIZED HEALTH CARE EDUCATION/TRAINING PROGRAM

## 2023-05-31 PROCEDURE — D9220A PRA ANESTHESIA: ICD-10-PCS | Mod: ANES,,, | Performed by: STUDENT IN AN ORGANIZED HEALTH CARE EDUCATION/TRAINING PROGRAM

## 2023-05-31 PROCEDURE — 71000015 HC POSTOP RECOV 1ST HR: Performed by: STUDENT IN AN ORGANIZED HEALTH CARE EDUCATION/TRAINING PROGRAM

## 2023-05-31 PROCEDURE — 37000008 HC ANESTHESIA 1ST 15 MINUTES: Performed by: STUDENT IN AN ORGANIZED HEALTH CARE EDUCATION/TRAINING PROGRAM

## 2023-05-31 PROCEDURE — 25000003 PHARM REV CODE 250: Performed by: NURSE ANESTHETIST, CERTIFIED REGISTERED

## 2023-05-31 PROCEDURE — 43249 PR EGD, FLEX, W/BALL DILATION, < 30MM: ICD-10-PCS | Mod: 78,,, | Performed by: STUDENT IN AN ORGANIZED HEALTH CARE EDUCATION/TRAINING PROGRAM

## 2023-05-31 PROCEDURE — 43249 ESOPH EGD DILATION <30 MM: CPT | Mod: 78,,, | Performed by: STUDENT IN AN ORGANIZED HEALTH CARE EDUCATION/TRAINING PROGRAM

## 2023-05-31 PROCEDURE — D9220A PRA ANESTHESIA: Mod: CRNA,,, | Performed by: NURSE ANESTHETIST, CERTIFIED REGISTERED

## 2023-05-31 RX ORDER — FENTANYL CITRATE 50 UG/ML
INJECTION, SOLUTION INTRAMUSCULAR; INTRAVENOUS
Status: DISCONTINUED | OUTPATIENT
Start: 2023-05-31 | End: 2023-05-31

## 2023-05-31 RX ORDER — METFORMIN HYDROCHLORIDE 1000 MG/1
1000 TABLET ORAL 2 TIMES DAILY WITH MEALS
COMMUNITY
End: 2023-07-19

## 2023-05-31 RX ORDER — ONDANSETRON 2 MG/ML
INJECTION INTRAMUSCULAR; INTRAVENOUS
Status: DISCONTINUED | OUTPATIENT
Start: 2023-05-31 | End: 2023-05-31

## 2023-05-31 RX ORDER — SODIUM CHLORIDE 0.9 % (FLUSH) 0.9 %
10 SYRINGE (ML) INJECTION
Status: DISCONTINUED | OUTPATIENT
Start: 2023-05-31 | End: 2023-05-31 | Stop reason: HOSPADM

## 2023-05-31 RX ORDER — SUCCINYLCHOLINE CHLORIDE 20 MG/ML
INJECTION INTRAMUSCULAR; INTRAVENOUS
Status: DISCONTINUED | OUTPATIENT
Start: 2023-05-31 | End: 2023-05-31

## 2023-05-31 RX ORDER — FENTANYL CITRATE 50 UG/ML
25 INJECTION, SOLUTION INTRAMUSCULAR; INTRAVENOUS EVERY 5 MIN PRN
Status: DISCONTINUED | OUTPATIENT
Start: 2023-05-31 | End: 2023-05-31 | Stop reason: HOSPADM

## 2023-05-31 RX ORDER — LIDOCAINE HYDROCHLORIDE 20 MG/ML
INJECTION INTRAVENOUS
Status: DISCONTINUED | OUTPATIENT
Start: 2023-05-31 | End: 2023-05-31

## 2023-05-31 RX ORDER — PROPOFOL 10 MG/ML
VIAL (ML) INTRAVENOUS
Status: DISCONTINUED | OUTPATIENT
Start: 2023-05-31 | End: 2023-05-31

## 2023-05-31 RX ORDER — ONDANSETRON 2 MG/ML
4 INJECTION INTRAMUSCULAR; INTRAVENOUS DAILY PRN
Status: DISCONTINUED | OUTPATIENT
Start: 2023-05-31 | End: 2023-05-31 | Stop reason: HOSPADM

## 2023-05-31 RX ORDER — DEXAMETHASONE SODIUM PHOSPHATE 4 MG/ML
INJECTION, SOLUTION INTRA-ARTICULAR; INTRALESIONAL; INTRAMUSCULAR; INTRAVENOUS; SOFT TISSUE
Status: DISCONTINUED | OUTPATIENT
Start: 2023-05-31 | End: 2023-05-31

## 2023-05-31 RX ADMIN — ONDANSETRON 4 MG: 2 INJECTION INTRAMUSCULAR; INTRAVENOUS at 01:05

## 2023-05-31 RX ADMIN — PROPOFOL 140 MG: 10 INJECTION, EMULSION INTRAVENOUS at 12:05

## 2023-05-31 RX ADMIN — LIDOCAINE HYDROCHLORIDE 100 MG: 20 INJECTION INTRAVENOUS at 12:05

## 2023-05-31 RX ADMIN — DEXAMETHASONE SODIUM PHOSPHATE 4 MG: 4 INJECTION, SOLUTION INTRAMUSCULAR; INTRAVENOUS at 12:05

## 2023-05-31 RX ADMIN — SODIUM CHLORIDE: 0.9 INJECTION, SOLUTION INTRAVENOUS at 12:05

## 2023-05-31 RX ADMIN — FENTANYL CITRATE 50 MCG: 50 INJECTION INTRAMUSCULAR; INTRAVENOUS at 12:05

## 2023-05-31 RX ADMIN — SUCCINYLCHOLINE 100 MG: 20 INJECTION, SOLUTION INTRAMUSCULAR; INTRAVENOUS at 12:05

## 2023-05-31 NOTE — H&P
Unable to completely delete note.  Entered in error.    Luis M Moscoso MD  General Surgery, PGY-5  [phone]

## 2023-05-31 NOTE — ANESTHESIA PREPROCEDURE EVALUATION
05/31/2023  Lukasz Person is a 68 y.o., male.  Pre-operative evaluation for Procedure(s) (LRB):  EGD (ESOPHAGOGASTRODUODENOSCOPY) WITH DILATION (N/A)    Lukasz Person is a 68 y.o. male     Patient Active Problem List   Diagnosis    Type 2 diabetes mellitus with kidney complication, without long-term current use of insulin    KUMAR on CPAP    Gout    Hyperparathyroidism, primary    Hypertension associated with diabetes    Hyperlipidemia associated with type 2 diabetes mellitus    Obesity, diabetes, and hypertension syndrome    DM type 2 without retinopathy    Diabetes mellitus with cataract    Male hypogonadism    Coronary artery disease involving native coronary artery of native heart    Low serum alkaline phosphatase    BPH with urinary obstruction    Erectile dysfunction due to arterial insufficiency    Nephrolithiasis    Paroxysmal atrial fibrillation    Chronic anticoagulation    HFrEF (heart failure with reduced ejection fraction)    Non-ischemic cardiomyopathy    CKD stage 3 due to type 2 diabetes mellitus    Esophageal adenocarcinoma    Aortic atherosclerosis    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     Current Outpatient Medications on File Prior to Encounter   Medication Sig Dispense Refill    allopurinoL (ZYLOPRIM) 100 MG tablet TAKE 1 TABLET BY MOUTH EVERY DAY 30 tablet 10    lisinopriL-hydrochlorothiazide (PRINZIDE,ZESTORETIC) 10-12.5 mg per tablet Take 1 tablet by mouth once daily. 90 tablet 3    metFORMIN (GLUCOPHAGE) 1000 MG tablet Take 1,000 mg by mouth 2 (two) times daily with meals. Pt takes half the dose (500 mg, BID)      metoprolol succinate (TOPROL-XL)  25 MG 24 hr tablet Take 1 tablet (25 mg total) by mouth once daily. 30 tablet 11    omeprazole (PRILOSEC) 40 MG capsule Take 1 capsule (40 mg total) by mouth once daily. 90 capsule 3    rivaroxaban (XARELTO) 20 mg Tab Take 1 tablet (20 mg total) by mouth daily with dinner or evening meal. 30 tablet 11    testosterone (ANDROGEL) 20.25 mg/1.25 gram (1.62 %) GlPm Apply 4 pumps to shoulders daily 2 each 5    blood sugar diagnostic (ACCU-CHEK ANTONELLA PLUS TEST STRP) Strp Use to test CBG four times daily E11.65 400 strip 1    blood-glucose meter kit Checks blood sugars 1x/daily. 1 each 12    lancets (ACCU-CHEK SOFTCLIX LANCETS) Misc Use to test CBG 4 times daily E11.65 400 each 1    nitroGLYCERIN (NITROSTAT) 0.4 MG SL tablet Place 1 tablet (0.4 mg total) under the tongue every 5 (five) minutes as needed for Chest pain. If you need a third tablet, call 911 60 tablet 12    rosuvastatin (CRESTOR) 20 MG tablet TAKE 1 TABLET(20 MG) BY MOUTH EVERY DAY (Patient taking differently: Take 20 mg by mouth every evening.) 30 tablet 11    tamsulosin (FLOMAX) 0.4 mg Cap Take 1 capsule (0.4 mg total) by mouth once daily. (Patient taking differently: Take 0.4 mg by mouth nightly.) 30 capsule 11       Past Surgical History:   Procedure Laterality Date    CORONARY ANGIOGRAPHY N/A 9/30/2020    Procedure: ANGIOGRAM, CORONARY ARTERY;  Surgeon: John West MD;  Location: [facility] CATH LAB;  Service: Cardiology;  Laterality: N/A;    CYSTOSCOPY N/A 2/17/2022    Procedure: CYSTOSCOPY;  Surgeon: Lukasz Hughes MD;  Location: [facility] OR [address] [place] [address];  Service: Urology;  Laterality: N/A;    CYSTOSCOPY W/ URETERAL STENT PLACEMENT N/A 2/25/2022    Procedure: CYSTOSCOPY, WITH URETERAL STENT INSERTION;  Surgeon: Lukasz Hughes MD;  Location: [facility] OR [address] [place] [address];  Service: Urology;  Laterality: N/A;    ENDOSCOPIC ULTRASOUND OF UPPER GASTROINTESTINAL TRACT N/A 12/7/2022    Procedure: ULTRASOUND, UPPER GI TRACT, ENDOSCOPIC;  Surgeon: Darian ARRIAGA  MD Jose David;  Location: [facility] ENDO;  Service: Endoscopy;  Laterality: N/A;  Approval to hold Xarelto rec'd from Dr. Nick (see t/e 12/5/22)-DS    ESOPHAGOGASTRODUODENOSCOPY N/A 11/17/2022    Procedure: EGD (ESOPHAGOGASTRODUODENOSCOPY);  Surgeon: Brody Gonzales MD;  Location: [facility] ENDO (2ND FLR);  Service: Endoscopy;  Laterality: N/A;  inst via email-ok to hold Xarelto x 2 days-MS    LASER LITHOTRIPSY Left 2/25/2022    Procedure: LITHOTRIPSY, USING LASER;  Surgeon: Lukasz Hughes MD;  Location: [facility] OR 1ST FLR;  Service: Urology;  Laterality: Left;    LEFT HEART CATHETERIZATION Left 9/30/2020    Procedure: Left heart cath;  Surgeon: John West MD;  Location: [facility] CATH LAB;  Service: Cardiology;  Laterality: Left;    LITHOTRIPSY      PARATHYROIDECTOMY  1/1/2-107    PYELOSCOPY Left 2/25/2022    Procedure: PYELOSCOPY;  Surgeon: Lukasz Hughes MD;  [facility];  Service: Urology;  Laterality: Left;    ROBOT-ASSISTED SURGICAL REMOVAL OF ESOPHAGUS USING DA CARLOS XI N/A 3/14/2023    Procedure: XI ROBOTIC ESOPHAGECTOMY;  Surgeon: Santana Brown Jr., MD;  [facility];  Service: General;  Laterality: N/A;  Abdomen, Chest and Neck    ROBOTIC JEJUNOSTOMY N/A 3/14/2023    Procedure: ROBOTIC JEJUNOSTOMY TUBE INSERTION;  Surgeon: Santana Brown Jr., MD;  [facility];  Service: General;  Laterality: N/A;    TRANSESOPHAGEAL ECHOCARDIOGRAPHY N/A 4/7/2022    Procedure: ECHOCARDIOGRAM, TRANSESOPHAGEAL;  Surgeon: [facility] Provider;  Location: [facility] EP LAB;  Service: Cardiology;  Laterality: N/A;    TREATMENT OF CARDIAC ARRHYTHMIA N/A 4/7/2022    Procedure: Cardioversion or Defibrillation;  Surgeon: Iris Fisher NP;  Location: [facility] EP LAB;  Service: Cardiology;  Laterality: N/A;  afib, dccv, artie, anes, EH, 3prep    URETEROSCOPIC REMOVAL OF URETERIC CALCULUS Left 2/25/2022    Procedure: REMOVAL, CALCULUS, URETER, URETEROSCOPIC;  Surgeon: Lukasz HARRIS  MD Saul;  Location: [facility] OR [address] [place] [address];  Service: Urology;  Laterality: Left;    URETEROSCOPY Left 2022    Procedure: URETEROSCOPY;  Surgeon: Lukasz Hughes MD;  [facility];  Service: Urology;  Laterality: Left;    VOCAL CORD INJECTION Left 3/17/2023    Procedure: INJECTION, VOCAL CORD, LARYNGOSCOPIC;  Surgeon: Gm Altman MD;  Location: [facility] OR [facility];  Service: ENT;  Laterality: Left;       Social History     Socioeconomic History    Marital status:      Spouse name: Amanda   Tobacco Use    Smoking status: Former     Packs/day: 1.00     Years: 7.00     Pack years: 7.00     Types: Cigarettes, Cigars     Start date: 1972     Quit date:      Years since quittin.0     Passive exposure: Never    Smokeless tobacco: Never    Tobacco comments:     smoking was off and on.  cumulative 7 years.  never more than three years in one stretch or more lj   Substance and Sexual Activity    Alcohol use: Not Currently    Drug use: Not Currently     Types: Marijuana    Sexual activity: Not Currently     Partners: Female     Birth control/protection: None     Comment:      Social Determinants of Health     Financial Resource Strain: Low Risk     Difficulty of Paying Living Expenses: Not very hard   Food Insecurity: No Food Insecurity    Worried About Running Out of Food in the Last Year: Never true    Ran Out of Food in the Last Year: Never true   Transportation Needs: No Transportation Needs    Lack of Transportation (Medical): No    Lack of Transportation (Non-Medical): No   Physical Activity: Insufficiently Active    Days of Exercise per Week: 3 days    Minutes of Exercise per Session: 30 min   Stress: No Stress Concern Present    Feeling of Stress : Only a little   Social Connections: Unknown    Frequency of Communication with Friends and Family: Once a week    Frequency of Social Gatherings with Friends and Family: Once a week    Active Member of  Clubs or Organizations: Yes    Attends Club or Organization Meetings: More than 4 times per year    Marital Status:    Housing Stability: Low Risk     Unable to Pay for Housing in the Last Year: No    Number of Places Lived in the Last Year: 1    Unstable Housing in the Last Year: No         CBC:   Recent Labs     23  1250   WBC 3.45*   RBC 3.68*   HGB 10.6*   HCT 32.7*   *   MCV 89   MCH 28.8   MCHC 32.4       CMP:   Recent Labs     23  1250      K 3.4*   CL 99   CO2 26   BUN 12   CREATININE 1.2   *   MG 1.7   PHOS 3.1   CALCIUM 9.2   ALBUMIN 3.8   PROT 6.6   ALKPHOS 96   ALT 31   AST 36   BILITOT 0.6       INR  No results for input(s): PT, INR, PROTIME, APTT in the last 72 hours.        Diagnostic Studies:      EKD Echo:  No results found. However, due to the size of the patient record, not all encounters were searched. Please check Results Review for a complete set of results.        Pre-op Assessment    I have reviewed the Patient Summary Reports.     I have reviewed the Nursing Notes. I have reviewed the NPO Status.   I have reviewed the Medications.     Review of Systems  Cardiovascular:   Hypertension CAD      Renal/:   Chronic Renal Disease    Endocrine:   Diabetes        Physical Exam    Airway:  Mallampati: II           Anesthesia Plan  Type of Anesthesia, risks & benefits discussed:    Anesthesia Type: Gen ETT  Intra-op Monitoring Plan: Standard ASA Monitors  Post Op Pain Control Plan: multimodal analgesia  Induction:  IV  Airway Plan: Direct, Post-Induction  Informed Consent: Informed consent signed with the Patient and all parties understand the risks and agree with anesthesia plan.  All questions answered.   ASA Score: 3  Day of Surgery Review of History & Physical: H&P Update referred to the surgeon/provider.    Ready For Surgery From Anesthesia Perspective.     .

## 2023-05-31 NOTE — ANESTHESIA PROCEDURE NOTES
Intubation    Date/Time: 5/31/2023 12:54 PM  Performed by: Carson Andujar CRNA  Authorized by: Santana Lin MD     Intubation:     Induction:  Intravenous    Intubated:  Postinduction    Mask Ventilation:  Easy mask    Attempts:  1    Attempted By:  CRNA    Method of Intubation:  Video laryngoscopy    Blade:  Dowling 4    Laryngeal View Grade: Grade I - full view of cords      Difficult Airway Encountered?: No      Complications:  None    Airway Device:  Oral endotracheal tube    Airway Device Size:  7.5    Style/Cuff Inflation:  Cuffed (inflated to minimal occlusive pressure)    Tube secured:  22    Secured at:  The lips    Placement Verified By:  Capnometry    Complicating Factors:  None    Findings Post-Intubation:  BS equal bilateral and atraumatic/condition of teeth unchanged

## 2023-05-31 NOTE — INTERVAL H&P NOTE
The patient has been examined and the H&P has been reviewed:    I concur with the findings and no changes have occurred since H&P was written.    Surgery risks, benefits and alternative options discussed and understood by patient/family.    Proceed with EGD and dilation           Santana Brown Jr, MD      Surgical Oncology      5/31/2023, 9:46 AM

## 2023-05-31 NOTE — OP NOTE
[facility]  Surgical Oncology  Operative Note           Date of Procedure: 5/31/2023   Time: 1300    Procedure: Procedure(s) (LRB):  EGD (ESOPHAGOGASTRODUODENOSCOPY) WITH DILATION (N/A)     Surgeon(s) and Role:     * Santana Brown Jr., MD - Primary     * Luis M Moscoso MD - Resident - Chief    Pre-Operative Diagnosis: Esophageal adenocarcinoma [C15.9]  Esophageal adenocarcinoma  Dysphagia    Post-Operative Diagnosis:   History of esophagectomy  Mild stricture at anastomosis    Anesthesia: General    Procedure:  Esophagogastroduodenoscopy  Balloon dilation of esophageal stricture    Operative Findings:   Mild anastomotic stricture appreciated at approximately 22 cm from incisors  Conduit healthy appearing, pylorus patent and easily traversable  Pneumatic dilation accomplished to 20 mm    Indications:  Lukasz Person is a 68 y.o. year old male who underwent robotic esophagectomy in March 2023. He recovered well without leak. I recently saw him in the clinic and he was complaining of ongoing difficulty swallowing. Additionally, he has lost 17 lbs since his last visit. He presents today for EGD and dilation of suspected stricture.    Risks and benefits were reviewed including bleeding, infection, pain, scar, damage to surrounding structures, cardiovascular and pulmonary complications, esophageal perforation, need for additional procedures, death, and imponderables.  He expressed understanding and gave informed consent to proceed.    Procedure in Detail:  After informed was obtained and the patient was properly identified, the patient was taken to the operating room placed supine position.  After the uneventful induction of general anesthesia the endoscope was prepped and tested for use.  Once confirmed the scope was working properly, the endoscope was placed in the mouth and advanced to the posterior pharynx.  The upper esophageal sphincter was traversed and the esophagus intubated.  The proximal  esophagus appeared within normal limits endoscope was advanced to the level of the anastomosis.  A mild stricture was appreciated at the anastomosis, though I was able to traverse the scope easily into the gastric conduit.  The conduit was examined, appeared healthy and well-perfused.  There was no retained gastric contents.  The scope was advanced through the pylorus which appeared normal, and the pylorus was intubated.  The proximal duodenum was examined and the peer within normal limits.  The scope was withdrawn back into the gastric conduit to the level of the anastomotic stricture.  A fixed wire esophageal balloon dilation catheter was advanced over the scope and positioned across the point of stricture.  Under direct visualization the balloon was inflated to 18 mm and held for 3 minutes.  After this time interval, the balloon was deflated, catheter withdrawn into the scope, and the anastomotic stricture was evaluated.  This process was again repeated twice inflating the balloon to 19 mm, and against the 20 mm.  Again, the catheter was withdrawn into scope anastomosis examined, there was a mild amount of mucosal bleeding at this level.  At this time the case was complete, the scope was withdrawn after removing all gas from the upper gastrointestinal tract.  The patient tolerated the procedure well, there were no complications.  He was taken to the recovery room in stable condition following extubation.      Portions of the record were created with [email or web address] Direct voice recognition software. This may lead to occasional typographical errors due to the inherent limitations of the software. Read the chart carefully and recognize, using context, where substitutions have occurred. Please do not hesitate to contact me directly if clarification is needed.    Implants: * No implants in log *    Drains: None    Estimated Blood Loss (EBL):  Minimal    Specimens:   Specimen (24h ago, onward)      None                     Condition: Stable    Disposition: PACU - hemodynamically stable.    Attestation: I was present for the entire procedure.             Santana Brown Jr, MD      Surgical Oncology      5/31/2023, 2:29 PM

## 2023-05-31 NOTE — TRANSFER OF CARE
Anesthesia Transfer of Care Note    Patient: Lukasz Person    Procedure(s) Performed: Procedure(s) (LRB):  EGD (ESOPHAGOGASTRODUODENOSCOPY) WITH DILATION (N/A)    Patient location: [facility]    Anesthesia Type: general    Transport from OR: Transported from OR on 6-10 L/min O2 by face mask with adequate spontaneous ventilation    Post pain: adequate analgesia    Post assessment: no apparent anesthetic complications    Post vital signs: stable    Level of consciousness: awake, alert and oriented    Nausea/Vomiting: no nausea/vomiting    Complications: none    Transfer of care protocol was followed      Last vitals:   Visit Vitals  BP (!) 174/74 (BP Location: Right arm, Patient Position: Sitting)   Pulse (!) 48   Temp 36.1 °C (97 °F) (Temporal)   Resp 16   Ht 6' (1.829 m)   Wt 91.2 kg (201 lb)   SpO2 100%   BMI 27.26 kg/m²

## 2023-05-31 NOTE — BRIEF OP NOTE
[facility] - Surgery ([facility])  Brief Operative Note    Surgery Date: 5/31/2023     Surgeon(s) and Role:     * Santana Brown Jr., MD - Primary     * Luis M Moscoso MD - Resident - Chief    Assisting Surgeon: None    Pre-op Diagnosis:  Esophageal adenocarcinoma [C15.9]    Post-op Diagnosis:  Post-Op Diagnosis Codes:     * Esophageal adenocarcinoma [C15.9]    Procedure(s) (LRB):  EGD (ESOPHAGOGASTRODUODENOSCOPY) WITH DILATION (N/A)    Anesthesia: General    Operative Findings: See op note    Estimated Blood Loss: 10 ml         Specimens:   Specimen (24h ago, onward)      None              Discharge Note    OUTCOME: Patient tolerated treatment/procedure well without complication and is now ready for discharge.    DISPOSITION: Home or Self Care    FINAL DIAGNOSIS:  Type 2 diabetes mellitus with kidney complication, without long-term current use of insulin    FOLLOWUP: In clinic    DISCHARGE INSTRUCTIONS:    Discharge Procedure Orders   Diet Adult Regular     No dressing needed     Activity as tolerated

## 2023-06-01 NOTE — ANESTHESIA POSTPROCEDURE EVALUATION
Anesthesia Post Evaluation    Patient: Lukasz Person    Procedure(s) Performed: Procedure(s) (LRB):  EGD (ESOPHAGOGASTRODUODENOSCOPY) WITH DILATION (N/A)    Final Anesthesia Type: general      Patient location during evaluation: PACU  Patient participation: Yes- Able to Participate  Level of consciousness: awake and alert, awake and oriented  Post-procedure vital signs: reviewed and stable  Pain management: adequate  Airway patency: patent    PONV status at discharge: No PONV  Anesthetic complications: no      Cardiovascular status: blood pressure returned to baseline, hemodynamically stable and stable  Respiratory status: unassisted, spontaneous ventilation and room air  Hydration status: euvolemic  Follow-up not needed.          Vitals Value Taken Time   /57 05/31/23 1455   Temp 36.2 °C (97.2 °F) 05/31/23 1455   Pulse 52 05/31/23 1455   Resp 16 05/31/23 1455   SpO2 100 % 05/31/23 1455         No case tracking events are documented in the log.      Pain/Jeovanny Score: Jeovanny Score: 10 (5/31/2023  2:55 PM)

## 2023-06-02 ENCOUNTER — PATIENT MESSAGE (OUTPATIENT)
Dept: ADMINISTRATIVE | Facility: OTHER | Age: 68
End: 2023-06-02
Payer: MEDICARE

## 2023-06-06 ENCOUNTER — OFFICE VISIT (OUTPATIENT)
Dept: PODIATRY | Facility: CLINIC | Age: 68
End: 2023-06-06
Payer: MEDICARE

## 2023-06-06 VITALS
HEIGHT: 72 IN | SYSTOLIC BLOOD PRESSURE: 136 MMHG | BODY MASS INDEX: 27.62 KG/M2 | RESPIRATION RATE: 18 BRPM | TEMPERATURE: 99 F | WEIGHT: 203.94 LBS | HEART RATE: 48 BPM | DIASTOLIC BLOOD PRESSURE: 75 MMHG | OXYGEN SATURATION: 97 %

## 2023-06-06 DIAGNOSIS — E11.9 ENCOUNTER FOR DIABETIC FOOT EXAM: ICD-10-CM

## 2023-06-06 DIAGNOSIS — E11.42 DIABETIC POLYNEUROPATHY ASSOCIATED WITH TYPE 2 DIABETES MELLITUS: ICD-10-CM

## 2023-06-06 DIAGNOSIS — N18.31 TYPE 2 DIABETES MELLITUS WITH STAGE 3A CHRONIC KIDNEY DISEASE, WITHOUT LONG-TERM CURRENT USE OF INSULIN: Primary | ICD-10-CM

## 2023-06-06 DIAGNOSIS — E11.22 TYPE 2 DIABETES MELLITUS WITH STAGE 3A CHRONIC KIDNEY DISEASE, WITHOUT LONG-TERM CURRENT USE OF INSULIN: Primary | ICD-10-CM

## 2023-06-06 PROCEDURE — 99213 PR OFFICE/OUTPT VISIT, EST, LEVL III, 20-29 MIN: ICD-10-PCS | Mod: S$PBB,,, | Performed by: PODIATRIST

## 2023-06-06 PROCEDURE — 99213 OFFICE O/P EST LOW 20 MIN: CPT | Mod: S$PBB,,, | Performed by: PODIATRIST

## 2023-06-06 PROCEDURE — 99214 OFFICE O/P EST MOD 30 MIN: CPT | Mod: PBBFAC | Performed by: PODIATRIST

## 2023-06-06 PROCEDURE — 99999 PR PBB SHADOW E&M-EST. PATIENT-LVL IV: ICD-10-PCS | Mod: PBBFAC,,, | Performed by: PODIATRIST

## 2023-06-06 PROCEDURE — 99999 PR PBB SHADOW E&M-EST. PATIENT-LVL IV: CPT | Mod: PBBFAC,,, | Performed by: PODIATRIST

## 2023-06-06 NOTE — PROGRESS NOTES
Subjective:      Patient ID: Lukasz Person is a 68 y.o. male.    Chief Complaint:   Diabetes Mellitus (Kirk Wilson MD-03/30/23), Diabetic Foot Exam, Nail Problem ([employer]), and Callouses    Lukasz is a 68 y.o. male who returns to the clinic or evaluation and treatment of diabetic feet. Lukasz has a past medical history of Anticoagulant long-term use, Diabetes mellitus, Hyperlipidemia, Hypertension, Kidney stones, and Sleep apnea.      Pt here for yearly visit  Discusses his cancer treatment  No new complaints    PCP: Kirk Wilson MD    Date Last Seen by PCP: 3/30/23    Current shoe gear: Tennis shoes    Hemoglobin A1C   Date Value Ref Range Status   05/29/2023 6.2 (H) 4.0 - 5.6 % Final     Comment:     ADA Screening Guidelines:  5.7-6.4%  Consistent with prediabetes  >or=6.5%  Consistent with diabetes    High levels of fetal hemoglobin interfere with the HbA1C  assay. Heterozygous hemoglobin variants (HbS, HgC, etc)do  not significantly interfere with this assay.   However, presence of multiple variants may affect accuracy.     03/14/2023 6.6 (H) 4.0 - 5.6 % Final     Comment:     ADA Screening Guidelines:  5.7-6.4%  Consistent with prediabetes  >or=6.5%  Consistent with diabetes    High levels of fetal hemoglobin interfere with the HbA1C  assay. Heterozygous hemoglobin variants (HbS, HgC, etc)do  not significantly interfere with this assay.   However, presence of multiple variants may affect accuracy.     11/07/2022 6.9 (H) 4.0 - 5.6 % Final     Comment:     ADA Screening Guidelines:  5.7-6.4%  Consistent with prediabetes  >or=6.5%  Consistent with diabetes    High levels of fetal hemoglobin interfere with the HbA1C  assay. Heterozygous hemoglobin variants (HbS, HgC, etc)do  not significantly interfere with this assay.   However, presence of multiple variants may affect accuracy.              Past Medical History:   Diagnosis Date    Anticoagulant long-term use     Diabetes mellitus     Onset late 50s/early 60s     Hyperlipidemia     Hypertension     Onset late 50s/early 60s    Kidney stones     Sleep apnea     since 2006     Past Surgical History:   Procedure Laterality Date    CORONARY ANGIOGRAPHY N/A 9/30/2020    Procedure: ANGIOGRAM, CORONARY ARTERY;  Surgeon: John West MD;  Location: [facility] CATH LAB;  Service: Cardiology;  Laterality: N/A;    CYSTOSCOPY N/A 2/17/2022    Procedure: CYSTOSCOPY;  Surgeon: Lukasz Hughes MD;  [facility];  Service: Urology;  Laterality: N/A;    CYSTOSCOPY W/ URETERAL STENT PLACEMENT N/A 2/25/2022    Procedure: CYSTOSCOPY, WITH URETERAL STENT INSERTION;  Surgeon: Lukasz Hughes MD;  Location: [facility] OR [facility] FLR;  Service: Urology;  Laterality: N/A;    ENDOSCOPIC ULTRASOUND OF UPPER GASTROINTESTINAL TRACT N/A 12/7/2022    Procedure: ULTRASOUND, UPPER GI TRACT, ENDOSCOPIC;  Surgeon: Darian Main MD;  Location: [facility] ENDO;  Service: Endoscopy;  Laterality: N/A;  Approval to hold Xarelto rec'd from Dr. Nick (see t/e 12/5/22)-DS    ESOPHAGOGASTRODUODENOSCOPY N/A 11/17/2022    Procedure: EGD (ESOPHAGOGASTRODUODENOSCOPY);  Surgeon: Brody Gonzales MD;  Location: [facility] (2ND FLR);  Service: Endoscopy;  Laterality: N/A;  inst via email-ok to hold Xarelto x 2 days-MS    ESOPHAGOGASTRODUODENOSCOPY N/A 5/31/2023    Procedure: EGD (ESOPHAGOGASTRODUODENOSCOPY) WITH DILATION;  Surgeon: Santana Brown Jr., MD;  Location: [facility] OR 2ND FLR;  Service: General;  Laterality: N/A;    LASER LITHOTRIPSY Left 2/25/2022    Procedure: LITHOTRIPSY, USING LASER;  Surgeon: Lukasz Hughes MD;  [facility];  Service: Urology;  Laterality: Left;    LEFT HEART CATHETERIZATION Left 9/30/2020    Procedure: Left heart cath;  Surgeon: John West MD;  Location: [facility] CATH LAB;  Service: Cardiology;  Laterality: Left;    LITHOTRIPSY      PARATHYROIDECTOMY  1/1/2-107    PYELOSCOPY Left 2/25/2022    Procedure: PYELOSCOPY;  Surgeon: Lukasz Hughes MD;  Location: [facility]  OR 1ST FLR;  Service: Urology;  Laterality: Left;    ROBOT-ASSISTED SURGICAL REMOVAL OF ESOPHAGUS USING DA CARLOS XI N/A 3/14/2023    Procedure: XI ROBOTIC ESOPHAGECTOMY;  Surgeon: Santana Brown Jr., MD;  [facility];  Service: General;  Laterality: N/A;  Abdomen, Chest and Neck    ROBOTIC JEJUNOSTOMY N/A 3/14/2023    Procedure: ROBOTIC JEJUNOSTOMY TUBE INSERTION;  Surgeon: Santana Brown Jr., MD;  [facility];  Service: General;  Laterality: N/A;    TRANSESOPHAGEAL ECHOCARDIOGRAPHY N/A 4/7/2022    Procedure: ECHOCARDIOGRAM, TRANSESOPHAGEAL;  Surgeon: [facility] Diagnostic Provider;  Location: [facility] EP LAB;  Service: Cardiology;  Laterality: N/A;    TREATMENT OF CARDIAC ARRHYTHMIA N/A 4/7/2022    Procedure: Cardioversion or Defibrillation;  Surgeon: Iris Fisher NP;  Location: [facility] EP LAB;  Service: Cardiology;  Laterality: N/A;  afib, dccv, artie, anes, EH, 3prep    URETEROSCOPIC REMOVAL OF URETERIC CALCULUS Left 2/25/2022    Procedure: REMOVAL, CALCULUS, URETER, URETEROSCOPIC;  Surgeon: Lukasz Hughes MD;  [facility];  Service: Urology;  Laterality: Left;    URETEROSCOPY Left 2/25/2022    Procedure: URETEROSCOPY;  Surgeon: Lukasz Hughes MD;  [facility];  Service: Urology;  Laterality: Left;    VOCAL CORD INJECTION Left 3/17/2023    Procedure: INJECTION, VOCAL CORD, LARYNGOSCOPIC;  Surgeon: Gm Altman MD;  [facility];  Service: ENT;  Laterality: Left;     Current Outpatient Medications on File Prior to Visit   Medication Sig Dispense Refill    allopurinoL (ZYLOPRIM) 100 MG tablet TAKE 1 TABLET BY MOUTH EVERY DAY 30 tablet 10    blood sugar diagnostic (ACCU-CHEK ANTONELLA PLUS TEST STRP) Strp Use to test CBG four times daily E11.65 400 strip 1    blood-glucose meter kit Checks blood sugars 1x/daily. 1 each 12    lancets (ACCU-CHEK SOFTCLIX LANCETS) Misc Use to test CBG 4 times daily E11.65 400 each 1     lisinopriL-hydrochlorothiazide (PRINZIDE,ZESTORETIC) 10-12.5 mg per tablet Take 1 tablet by mouth once daily. 90 tablet 3    metFORMIN (GLUCOPHAGE) 1000 MG tablet Take 1,000 mg by mouth 2 (two) times daily with meals. Pt takes half the dose (500 mg, BID)      nitroGLYCERIN (NITROSTAT) 0.4 MG SL tablet Place 1 tablet (0.4 mg total) under the tongue every 5 (five) minutes as needed for Chest pain. If you need a third tablet, call 911 60 tablet 12    omeprazole (PRILOSEC) 40 MG capsule Take 1 capsule (40 mg total) by mouth once daily. 90 capsule 3    rivaroxaban (XARELTO) 20 mg Tab Take 1 tablet (20 mg total) by mouth daily with dinner or evening meal. 30 tablet 11    rosuvastatin (CRESTOR) 20 MG tablet TAKE 1 TABLET(20 MG) BY MOUTH EVERY DAY (Patient taking differently: Take 20 mg by mouth every evening.) 30 tablet 11    tamsulosin (FLOMAX) 0.4 mg Cap Take 1 capsule (0.4 mg total) by mouth once daily. (Patient taking differently: Take 0.4 mg by mouth nightly.) 30 capsule 11    testosterone (ANDROGEL) 20.25 mg/1.25 gram (1.62 %) GlPm Apply 4 pumps to shoulders daily 2 each 5    metoprolol succinate (TOPROL-XL) 25 MG 24 hr tablet Take 1 tablet (25 mg total) by mouth once daily. 30 tablet 11     No current facility-administered medications on file prior to visit.     Review of patient's allergies indicates:  No Known Allergies    Review of Systems   Constitutional: Negative for chills, decreased appetite, fever, malaise/fatigue, night sweats, weight gain and weight loss.   Cardiovascular:  Negative for chest pain, claudication, dyspnea on exertion, leg swelling, palpitations and syncope.   Respiratory:  Negative for cough and shortness of breath.    Endocrine: Negative for cold intolerance and heat intolerance.   Hematologic/Lymphatic: Negative for bleeding problem. Does not bruise/bleed easily.   Skin:  Positive for nail changes. Negative for color change, dry skin, flushing, itching, poor wound healing, rash, skin  cancer, suspicious lesions and unusual hair distribution.   Musculoskeletal:  Positive for arthritis and stiffness. Negative for back pain, falls, gout, joint pain, joint swelling, muscle cramps, muscle weakness, myalgias and neck pain.   Gastrointestinal:  Negative for diarrhea, nausea and vomiting.   Neurological:  Positive for numbness and paresthesias. Negative for dizziness, focal weakness, light-headedness, tremors, vertigo and weakness.   Psychiatric/Behavioral:  Negative for altered mental status and depression. The patient does not have insomnia.    Allergic/Immunologic: Negative.          Objective:       Vitals:    06/06/23 1017   BP: 136/75   Pulse: (!) 48   Resp: 18   Temp: 98.9 °F (37.2 °C)   SpO2: 97%   Weight: 92.5 kg (203 lb 14.8 oz)   Height: 6' (1.829 m)   PainSc: 0-No pain   92.5 kg (203 lb 14.8 oz)     Physical Exam  Vitals reviewed.   Constitutional:       General: He is not in acute distress.     Appearance: He is well-developed. He is not ill-appearing, toxic-appearing or diaphoretic.      Comments: Proper supportive shoegear   Cardiovascular:      Pulses:           Dorsalis pedis pulses are 2+ on the right side and 2+ on the left side.        Posterior tibial pulses are 1+ on the right side and 1+ on the left side.   Musculoskeletal:         General: No tenderness.      Right lower leg: No tenderness. No edema.      Left lower leg: No tenderness. No edema.      Right foot: Decreased range of motion. Deformity, bunion and prominent metatarsal heads present. No tenderness or bony tenderness.      Left foot: Decreased range of motion. Deformity, bunion and prominent metatarsal heads present. No tenderness or bony tenderness.      Comments: Flexible pes planus foot type w/ medial arch collapse and mild gastroc equinus      Reducible extensor and flexor contractures at the MTPJ and PIPJ of toes 2-5, bilat.      1st MPJ exostosis w/ lateral deviation of hallux, non trackbound.       Feet:       Right foot:      Protective Sensation: 10 sites tested.  6 sites sensed.      Skin integrity: Dry skin and fissure present. No ulcer, blister, skin breakdown, erythema, warmth or callus.      Toenail Condition: Right toenails are abnormally thick and long.      Left foot:      Protective Sensation: 10 sites tested.  6 sites sensed.      Skin integrity: Dry skin and fissure present. No ulcer, blister, skin breakdown, erythema, warmth or callus.      Toenail Condition: Left toenails are abnormally thick and long.      Comments:   No open lesions    SWM decreased    Nails 1-10 thickened.             Skin:     General: Skin is warm and dry.      Capillary Refill: Capillary refill takes 2 to 3 seconds.      Coloration: Skin is not pale.      Findings: No erythema or rash.      Nails: There is no clubbing.   Neurological:      Mental Status: He is alert and oriented to person, place, and time.      Gait: Gait abnormal.   Psychiatric:         Attention and Perception: Attention normal.         Mood and Affect: Mood normal.         Speech: Speech normal.         Behavior: Behavior normal.         Thought Content: Thought content normal.         Judgment: Judgment normal.             Assessment:       Encounter Diagnoses   Name Primary?    Type 2 diabetes mellitus with stage 3a chronic kidney disease, without long-term current use of insulin Yes    Encounter for diabetic foot exam            Plan:       Lukasz was seen today for diabetes mellitus, diabetic foot exam, nail problem and callouses.    Diagnoses and all orders for this visit:    Type 2 diabetes mellitus with stage 3a chronic kidney disease, without long-term current use of insulin    Encounter for diabetic foot exam        I counseled the patient on his conditions, their implications and medical management.      - foot exam    - Shoe inspection. Diabetic Foot Education. Patient reminded of the importance of good nutrition and blood sugar control to help prevent  podiatric complications of diabetes. Patient instructed on proper foot hygeine. We discussed wearing proper shoe gear, daily foot inspections, never walking without protective shoe gear, never putting sharp instruments to feet         - With patient's permission, the toenails mentioned above were aggressively reduced and debrided using a nail nipper, removing all offending nail and debris.       Doing well; discussed mild neuropathy symptoms    F/u yearly or sooner if needed

## 2023-06-08 ENCOUNTER — PATIENT MESSAGE (OUTPATIENT)
Dept: ADMINISTRATIVE | Facility: OTHER | Age: 68
End: 2023-06-08
Payer: MEDICARE

## 2023-06-08 ENCOUNTER — TELEPHONE (OUTPATIENT)
Dept: ENDOSCOPY | Facility: HOSPITAL | Age: 68
End: 2023-06-08
Payer: MEDICARE

## 2023-06-08 NOTE — TELEPHONE ENCOUNTER
Contacted the patient to schedule an endoscopy procedure(s) : colonoscopy. The patient did not answer the call and left a voice message asking them to contact Endoscopy Scheduling as soon as possible at [phone].

## 2023-06-09 ENCOUNTER — CLINICAL SUPPORT (OUTPATIENT)
Dept: HEMATOLOGY/ONCOLOGY | Facility: CLINIC | Age: 68
End: 2023-06-09
Payer: MEDICARE

## 2023-06-09 DIAGNOSIS — Z71.3 NUTRITIONAL COUNSELING: ICD-10-CM

## 2023-06-09 DIAGNOSIS — C15.9 ESOPHAGEAL ADENOCARCINOMA: Primary | ICD-10-CM

## 2023-06-09 DIAGNOSIS — E44.0 MODERATE MALNUTRITION: ICD-10-CM

## 2023-06-09 PROCEDURE — 97803 PR MED NUTR THER, SUBSQ, INDIV, EA 15 MIN: ICD-10-PCS | Mod: 95,,, | Performed by: DIETITIAN, REGISTERED

## 2023-06-09 PROCEDURE — 97803 MED NUTRITION INDIV SUBSEQ: CPT | Mod: 95,,, | Performed by: DIETITIAN, REGISTERED

## 2023-06-09 NOTE — PROGRESS NOTES
Oncology Nutrition Assessment for Medical Nutrition Therapy  Follow-up Visit    Lukasz Person   1955    Referring Provider: No ref. provider found      Reason for Visit: nutrition counseling and education    The patient location is: [place]  The chief complaint leading to consultation is: nutrition follow-up    Visit type: audiovisual    Face to Face time with patient: 12 minutes  20 minutes of total time spent on the encounter, which includes face to face time and non-face to face time preparing to see the patient (eg, review of tests), Obtaining and/or reviewing separately obtained history, Documenting clinical information in the electronic or other health record, Independently interpreting results (not separately reported) and communicating results to the patient/family/caregiver, or Care coordination (not separately reported).     Each patient to whom he or she provides medical services by telemedicine is:  (1) informed of the relationship between the physician and patient and the respective role of any other health care provider with respect to management of the patient; and (2) notified that he or she may decline to receive medical services by telemedicine and may withdraw from such care at any time.    PMHx:   Past Medical History:   Diagnosis Date    Anticoagulant long-term use     Diabetes mellitus     Onset late 50s/early 60s    Hyperlipidemia     Hypertension     Onset late 50s/early 60s    Kidney stones     Sleep apnea     since 2006       Nutrition Assessment    This is a 68 y.o.male with a medical diagnosis of esophageal adenocarcinoma s/p neoadjuvant chemoradiation and now s/p esophagectomy 3/14. He is known to me from previous appointments. At our last appointment TF was stopped and he had J-tube removed 5/1. Over the past 1.5 months he has lost ~20lb. He is now s/p cycle 2 adjuvant Opdivo. He had increased issues with dysphagia and was taken for EGD with dilation 5/31. He reports that he continues to  be limited as to how much he can eat at a meal. He is eating small frequent meals/snacks (6-7/day). He is drinking plenty of water, 1/2 gallon of water with a splash of cranapple juice, as well as tea. He is not longer supplementing with Boost. He reports he started to have issues with lactose intolerance like symptoms after drinking them (even just half).     Diet recall:  Toast or small bowl of cereal, occasionally scrambled eggs  1/2 sandwich (ham or tuna)  Lunch - leftovers (such as gumbo)  1/2 sandwich (ham or tuna)  Dinner - order out (chinese food like noodles with chicken or vegetables)  Snacks - 1-2 Power Bars    Weight: 92.5 kg (203 lb 14.8 oz) - 6/6 clinic weight  Height: 6' (182.9 cm)  BMI: 27.66    Usual BW: 260-265lb  Weight Change: 30lb loss over 3 months (15lb of which were lost after surgery); another 20lb loss over 1.5 months    Allergies: Patient has no known allergies.    Current Medications:  Current Outpatient Medications:     allopurinoL (ZYLOPRIM) 100 MG tablet, TAKE 1 TABLET BY MOUTH EVERY DAY, Disp: 30 tablet, Rfl: 10    blood sugar diagnostic (ACCU-CHEK ANTONELLA PLUS TEST STRP) Strp, Use to test CBG four times daily E11.65, Disp: 400 strip, Rfl: 1    blood-glucose meter kit, Checks blood sugars 1x/daily., Disp: 1 each, Rfl: 12    lancets (ACCU-CHEK SOFTCLIX LANCETS) Misc, Use to test CBG 4 times daily E11.65, Disp: 400 each, Rfl: 1    lisinopriL-hydrochlorothiazide (PRINZIDE,ZESTORETIC) 10-12.5 mg per tablet, Take 1 tablet by mouth once daily., Disp: 90 tablet, Rfl: 3    metFORMIN (GLUCOPHAGE) 1000 MG tablet, Take 1,000 mg by mouth 2 (two) times daily with meals. Pt takes half the dose (500 mg, BID), Disp: , Rfl:     metoprolol succinate (TOPROL-XL) 25 MG 24 hr tablet, Take 1 tablet (25 mg total) by mouth once daily., Disp: 30 tablet, Rfl: 11    nitroGLYCERIN (NITROSTAT) 0.4 MG SL tablet, Place 1 tablet (0.4 mg total) under the tongue every 5 (five) minutes as needed for Chest pain. If you  need a third tablet, call 911, Disp: 60 tablet, Rfl: 12    omeprazole (PRILOSEC) 40 MG capsule, Take 1 capsule (40 mg total) by mouth once daily., Disp: 90 capsule, Rfl: 3    rivaroxaban (XARELTO) 20 mg Tab, Take 1 tablet (20 mg total) by mouth daily with dinner or evening meal., Disp: 30 tablet, Rfl: 11    rosuvastatin (CRESTOR) 20 MG tablet, TAKE 1 TABLET(20 MG) BY MOUTH EVERY DAY (Patient taking differently: Take 20 mg by mouth every evening.), Disp: 30 tablet, Rfl: 11    tamsulosin (FLOMAX) 0.4 mg Cap, Take 1 capsule (0.4 mg total) by mouth once daily. (Patient taking differently: Take 0.4 mg by mouth nightly.), Disp: 30 capsule, Rfl: 11    testosterone (ANDROGEL) 20.25 mg/1.25 gram (1.62 %) GlPm, Apply 4 pumps to shoulders daily, Disp: 2 each, Rfl: 5    Food/medication interactions noted: none    Vitamins/Supplements: none    Labs: Reviewed    Nutrition Diagnosis    Problem: moderate malnutrition  Etiology (related to): appetite loss and early satiety  Signs/Symptoms (as evidenced by): reports of inadequate PO intake, weight loss, and both fat & muscle wasting present    Nutrition Intervention    Nutrition Prescription   2313 kcals (25kcal/kg)  93g protein (1g/kg)   2313mL fluid (25mL/kg)    Recommendations:  Continue eating small frequent meals/snacks - at least 6/day  Make meals/snacks high in calories and protein - examples discussed  Continue to drink at least 64oz fluid/day    Materials Provided/Reviewed: none    Nutrition Monitoring and Evaluation    Monitor: diet education needs, energy intake, and weight status    Goals: weight maintenance    Follow up: in 4-5 weeks    Communication to referring provider/care team: note available in chart    Counseling time: 12 minutes    Carmen Clark MS, RD, LDN  [phone]

## 2023-06-10 ENCOUNTER — PATIENT MESSAGE (OUTPATIENT)
Dept: ADMINISTRATIVE | Facility: OTHER | Age: 68
End: 2023-06-10
Payer: MEDICARE

## 2023-06-11 ENCOUNTER — PATIENT MESSAGE (OUTPATIENT)
Dept: ADMINISTRATIVE | Facility: OTHER | Age: 68
End: 2023-06-11
Payer: MEDICARE

## 2023-06-12 ENCOUNTER — PATIENT MESSAGE (OUTPATIENT)
Dept: ADMINISTRATIVE | Facility: OTHER | Age: 68
End: 2023-06-12
Payer: MEDICARE

## 2023-06-14 ENCOUNTER — PATIENT MESSAGE (OUTPATIENT)
Dept: ADMINISTRATIVE | Facility: OTHER | Age: 68
End: 2023-06-14
Payer: MEDICARE

## 2023-06-15 ENCOUNTER — PATIENT MESSAGE (OUTPATIENT)
Dept: ADMINISTRATIVE | Facility: OTHER | Age: 68
End: 2023-06-15
Payer: MEDICARE

## 2023-06-16 ENCOUNTER — TELEPHONE (OUTPATIENT)
Dept: ENDOSCOPY | Facility: HOSPITAL | Age: 68
End: 2023-06-16
Payer: MEDICARE

## 2023-06-16 ENCOUNTER — PATIENT MESSAGE (OUTPATIENT)
Dept: ADMINISTRATIVE | Facility: OTHER | Age: 68
End: 2023-06-16
Payer: MEDICARE

## 2023-06-16 NOTE — TELEPHONE ENCOUNTER
Shelly Chaney LPN     RW     3:04 PM  Note  Pt notified, verbalized understanding.             RW     3:04 PM    Shelly Chaney LPN contacted Lukasz Person         RW     1:10 PM    Shelly Chaney LPN attempted to contact Lukasz Person (Left Message)    Pallavi Nick MD  to Shelly Chaney LPN        12:14 PM   Yes he can hold the anticoagulant for 2 days and should resume as soon as possible after the procedure.   June 14, 2023       RW    11:37 AM  Shelly Chaney LPN routed this conversation to Pallavi Nick MD         NL    11:34 AM  You routed this conversation to MD Park Armstrong [facility]     NL    11:34 AM  Note  Patient has a colonoscopy referral that needs to be scheduled.  Patient is currently taking Xarelto.  Per Endoscopy protocol, pt needs to hold Xarelto for 2 days. Ok to proceed? Thank you.

## 2023-06-16 NOTE — TELEPHONE ENCOUNTER
Contacted the patient to schedule an endoscopy procedure(s) colonoscopy after receiving orders from Dr. Nick to hold Xarelto x 2 days prior to his procedure. The patient did not answer the call and left a voice message asking them to contact Endoscopy Scheduling as soon as possible at [phone] to schedule.

## 2023-06-17 ENCOUNTER — PATIENT MESSAGE (OUTPATIENT)
Dept: ADMINISTRATIVE | Facility: OTHER | Age: 68
End: 2023-06-17
Payer: MEDICARE

## 2023-06-18 ENCOUNTER — PATIENT MESSAGE (OUTPATIENT)
Dept: ADMINISTRATIVE | Facility: OTHER | Age: 68
End: 2023-06-18
Payer: MEDICARE

## 2023-06-20 ENCOUNTER — PATIENT MESSAGE (OUTPATIENT)
Dept: ADMINISTRATIVE | Facility: OTHER | Age: 68
End: 2023-06-20
Payer: MEDICARE

## 2023-06-21 ENCOUNTER — PATIENT MESSAGE (OUTPATIENT)
Dept: INTERNAL MEDICINE | Facility: CLINIC | Age: 68
End: 2023-06-21
Payer: MEDICARE

## 2023-06-22 ENCOUNTER — PATIENT MESSAGE (OUTPATIENT)
Dept: ADMINISTRATIVE | Facility: OTHER | Age: 68
End: 2023-06-22
Payer: MEDICARE

## 2023-06-23 ENCOUNTER — PATIENT MESSAGE (OUTPATIENT)
Dept: ADMINISTRATIVE | Facility: OTHER | Age: 68
End: 2023-06-23
Payer: MEDICARE

## 2023-06-24 ENCOUNTER — PATIENT MESSAGE (OUTPATIENT)
Dept: ADMINISTRATIVE | Facility: OTHER | Age: 68
End: 2023-06-24
Payer: MEDICARE

## 2023-06-25 ENCOUNTER — PATIENT MESSAGE (OUTPATIENT)
Dept: ADMINISTRATIVE | Facility: OTHER | Age: 68
End: 2023-06-25
Payer: MEDICARE

## 2023-06-26 ENCOUNTER — LAB VISIT (OUTPATIENT)
Dept: LAB | Facility: HOSPITAL | Age: 68
End: 2023-06-26
Attending: INTERNAL MEDICINE
Payer: MEDICARE

## 2023-06-26 DIAGNOSIS — Z79.899 ON ANTINEOPLASTIC CHEMOTHERAPY: ICD-10-CM

## 2023-06-26 DIAGNOSIS — Z00.6 CLINICAL TRIAL PARTICIPANT: ICD-10-CM

## 2023-06-26 DIAGNOSIS — C15.9 ESOPHAGEAL ADENOCARCINOMA: ICD-10-CM

## 2023-06-26 DIAGNOSIS — N13.8 BPH WITH OBSTRUCTION/LOWER URINARY TRACT SYMPTOMS: ICD-10-CM

## 2023-06-26 DIAGNOSIS — N40.1 BPH WITH OBSTRUCTION/LOWER URINARY TRACT SYMPTOMS: ICD-10-CM

## 2023-06-26 DIAGNOSIS — E29.1 MALE HYPOGONADISM: Chronic | ICD-10-CM

## 2023-06-26 LAB
ALBUMIN SERPL BCP-MCNC: 3.4 G/DL (ref 3.5–5.2)
ALP SERPL-CCNC: 123 U/L (ref 55–135)
ALT SERPL W/O P-5'-P-CCNC: 16 U/L (ref 10–44)
ANION GAP SERPL CALC-SCNC: 8 MMOL/L (ref 8–16)
AST SERPL-CCNC: 23 U/L (ref 10–40)
BASOPHILS # BLD AUTO: 0.02 K/UL (ref 0–0.2)
BASOPHILS NFR BLD: 0.6 % (ref 0–1.9)
BILIRUB DIRECT SERPL-MCNC: 0.3 MG/DL (ref 0.1–0.3)
BILIRUB SERPL-MCNC: 0.7 MG/DL (ref 0.1–1)
BUN SERPL-MCNC: 8 MG/DL (ref 8–23)
CALCIUM SERPL-MCNC: 9.1 MG/DL (ref 8.7–10.5)
CHLORIDE SERPL-SCNC: 107 MMOL/L (ref 95–110)
CO2 SERPL-SCNC: 28 MMOL/L (ref 23–29)
COMPLEXED PSA SERPL-MCNC: 0.86 NG/ML (ref 0–4)
CREAT SERPL-MCNC: 0.8 MG/DL (ref 0.5–1.4)
DIFFERENTIAL METHOD: ABNORMAL
EOSINOPHIL # BLD AUTO: 0.2 K/UL (ref 0–0.5)
EOSINOPHIL NFR BLD: 4.8 % (ref 0–8)
ERYTHROCYTE [DISTWIDTH] IN BLOOD BY AUTOMATED COUNT: 14.1 % (ref 11.5–14.5)
EST. GFR  (NO RACE VARIABLE): >60 ML/MIN/1.73 M^2
GLUCOSE SERPL-MCNC: 98 MG/DL (ref 70–110)
HCT VFR BLD AUTO: 34.7 % (ref 40–54)
HGB BLD-MCNC: 10.9 G/DL (ref 14–18)
IMM GRANULOCYTES # BLD AUTO: 0.01 K/UL (ref 0–0.04)
IMM GRANULOCYTES NFR BLD AUTO: 0.3 % (ref 0–0.5)
LYMPHOCYTES # BLD AUTO: 0.3 K/UL (ref 1–4.8)
LYMPHOCYTES NFR BLD: 8.4 % (ref 18–48)
MAGNESIUM SERPL-MCNC: 1.9 MG/DL (ref 1.6–2.6)
MCH RBC QN AUTO: 28.4 PG (ref 27–31)
MCHC RBC AUTO-ENTMCNC: 31.4 G/DL (ref 32–36)
MCV RBC AUTO: 90 FL (ref 82–98)
MONOCYTES # BLD AUTO: 0.4 K/UL (ref 0.3–1)
MONOCYTES NFR BLD: 11.6 % (ref 4–15)
NEUTROPHILS # BLD AUTO: 2.3 K/UL (ref 1.8–7.7)
NEUTROPHILS NFR BLD: 74.3 % (ref 38–73)
NRBC BLD-RTO: 0 /100 WBC
PHOSPHATE SERPL-MCNC: 2.7 MG/DL (ref 2.7–4.5)
PLATELET # BLD AUTO: 154 K/UL (ref 150–450)
PMV BLD AUTO: 10.1 FL (ref 9.2–12.9)
POTASSIUM SERPL-SCNC: 4 MMOL/L (ref 3.5–5.1)
PROT SERPL-MCNC: 6.3 G/DL (ref 6–8.4)
RBC # BLD AUTO: 3.84 M/UL (ref 4.6–6.2)
SODIUM SERPL-SCNC: 143 MMOL/L (ref 136–145)
T4 FREE SERPL-MCNC: 1.11 NG/DL (ref 0.71–1.51)
TESTOST SERPL-MCNC: 288 NG/DL (ref 304–1227)
TSH SERPL DL<=0.005 MIU/L-ACNC: 0.35 UIU/ML (ref 0.4–4)
WBC # BLD AUTO: 3.11 K/UL (ref 3.9–12.7)

## 2023-06-26 PROCEDURE — 84153 ASSAY OF PSA TOTAL: CPT | Performed by: NURSE PRACTITIONER

## 2023-06-26 PROCEDURE — 84439 ASSAY OF FREE THYROXINE: CPT | Performed by: INTERNAL MEDICINE

## 2023-06-26 PROCEDURE — 84443 ASSAY THYROID STIM HORMONE: CPT | Performed by: INTERNAL MEDICINE

## 2023-06-26 PROCEDURE — 80053 COMPREHEN METABOLIC PANEL: CPT | Mod: Q1 | Performed by: INTERNAL MEDICINE

## 2023-06-26 PROCEDURE — 84100 ASSAY OF PHOSPHORUS: CPT | Mod: Q1 | Performed by: INTERNAL MEDICINE

## 2023-06-26 PROCEDURE — 36415 COLL VENOUS BLD VENIPUNCTURE: CPT | Performed by: NURSE PRACTITIONER

## 2023-06-26 PROCEDURE — 82248 BILIRUBIN DIRECT: CPT | Mod: Q1 | Performed by: INTERNAL MEDICINE

## 2023-06-26 PROCEDURE — 84403 ASSAY OF TOTAL TESTOSTERONE: CPT | Mod: Q1 | Performed by: NURSE PRACTITIONER

## 2023-06-26 PROCEDURE — 83735 ASSAY OF MAGNESIUM: CPT | Performed by: INTERNAL MEDICINE

## 2023-06-26 PROCEDURE — 85025 COMPLETE CBC W/AUTO DIFF WBC: CPT | Mod: Q1 | Performed by: INTERNAL MEDICINE

## 2023-06-27 ENCOUNTER — OFFICE VISIT (OUTPATIENT)
Dept: UROLOGY | Facility: CLINIC | Age: 68
End: 2023-06-27
Payer: MEDICARE

## 2023-06-27 ENCOUNTER — INFUSION (OUTPATIENT)
Dept: INFUSION THERAPY | Facility: HOSPITAL | Age: 68
End: 2023-06-27
Payer: MEDICARE

## 2023-06-27 ENCOUNTER — OFFICE VISIT (OUTPATIENT)
Dept: HEMATOLOGY/ONCOLOGY | Facility: CLINIC | Age: 68
End: 2023-06-27
Payer: MEDICARE

## 2023-06-27 ENCOUNTER — RESEARCH ENCOUNTER (OUTPATIENT)
Dept: RESEARCH | Facility: HOSPITAL | Age: 68
End: 2023-06-27
Payer: MEDICARE

## 2023-06-27 VITALS
HEIGHT: 72 IN | HEART RATE: 80 BPM | DIASTOLIC BLOOD PRESSURE: 66 MMHG | WEIGHT: 198.44 LBS | SYSTOLIC BLOOD PRESSURE: 145 MMHG | BODY MASS INDEX: 26.88 KG/M2

## 2023-06-27 VITALS
TEMPERATURE: 98 F | WEIGHT: 200.5 LBS | OXYGEN SATURATION: 97 % | RESPIRATION RATE: 18 BRPM | HEIGHT: 72 IN | DIASTOLIC BLOOD PRESSURE: 66 MMHG | SYSTOLIC BLOOD PRESSURE: 145 MMHG | HEART RATE: 65 BPM | BODY MASS INDEX: 27.16 KG/M2

## 2023-06-27 VITALS
DIASTOLIC BLOOD PRESSURE: 67 MMHG | HEART RATE: 60 BPM | RESPIRATION RATE: 18 BRPM | SYSTOLIC BLOOD PRESSURE: 150 MMHG | BODY MASS INDEX: 27.16 KG/M2 | WEIGHT: 200.5 LBS | OXYGEN SATURATION: 98 % | HEIGHT: 72 IN

## 2023-06-27 DIAGNOSIS — I15.2 HYPERTENSION ASSOCIATED WITH DIABETES: ICD-10-CM

## 2023-06-27 DIAGNOSIS — C15.9 ESOPHAGEAL ADENOCARCINOMA: Primary | ICD-10-CM

## 2023-06-27 DIAGNOSIS — E11.22 CKD STAGE 3 DUE TO TYPE 2 DIABETES MELLITUS: ICD-10-CM

## 2023-06-27 DIAGNOSIS — I25.10 CORONARY ARTERY DISEASE INVOLVING NATIVE CORONARY ARTERY OF NATIVE HEART WITHOUT ANGINA PECTORIS: ICD-10-CM

## 2023-06-27 DIAGNOSIS — E78.5 HYPERLIPIDEMIA ASSOCIATED WITH TYPE 2 DIABETES MELLITUS: ICD-10-CM

## 2023-06-27 DIAGNOSIS — E11.69 HYPERLIPIDEMIA ASSOCIATED WITH TYPE 2 DIABETES MELLITUS: ICD-10-CM

## 2023-06-27 DIAGNOSIS — E29.1 MALE HYPOGONADISM: Chronic | ICD-10-CM

## 2023-06-27 DIAGNOSIS — J38.00 VOCAL CORD PARALYSIS: ICD-10-CM

## 2023-06-27 DIAGNOSIS — I50.20 HFREF (HEART FAILURE WITH REDUCED EJECTION FRACTION): ICD-10-CM

## 2023-06-27 DIAGNOSIS — N13.8 BPH WITH OBSTRUCTION/LOWER URINARY TRACT SYMPTOMS: Primary | ICD-10-CM

## 2023-06-27 DIAGNOSIS — E44.0 MODERATE MALNUTRITION: ICD-10-CM

## 2023-06-27 DIAGNOSIS — N18.30 CKD STAGE 3 DUE TO TYPE 2 DIABETES MELLITUS: ICD-10-CM

## 2023-06-27 DIAGNOSIS — E11.22 TYPE 2 DIABETES MELLITUS WITH STAGE 3 CHRONIC KIDNEY DISEASE, WITHOUT LONG-TERM CURRENT USE OF INSULIN, UNSPECIFIED WHETHER STAGE 3A OR 3B CKD: ICD-10-CM

## 2023-06-27 DIAGNOSIS — N18.30 TYPE 2 DIABETES MELLITUS WITH STAGE 3 CHRONIC KIDNEY DISEASE, WITHOUT LONG-TERM CURRENT USE OF INSULIN, UNSPECIFIED WHETHER STAGE 3A OR 3B CKD: ICD-10-CM

## 2023-06-27 DIAGNOSIS — E11.59 HYPERTENSION ASSOCIATED WITH DIABETES: ICD-10-CM

## 2023-06-27 DIAGNOSIS — I48.0 PAROXYSMAL ATRIAL FIBRILLATION: ICD-10-CM

## 2023-06-27 DIAGNOSIS — K04.7 DENTAL INFECTION: ICD-10-CM

## 2023-06-27 DIAGNOSIS — N40.1 BPH WITH OBSTRUCTION/LOWER URINARY TRACT SYMPTOMS: Primary | ICD-10-CM

## 2023-06-27 PROCEDURE — 99213 OFFICE O/P EST LOW 20 MIN: CPT | Mod: PBBFAC,25 | Performed by: NURSE PRACTITIONER

## 2023-06-27 PROCEDURE — 99999 PR PBB SHADOW E&M-EST. PATIENT-LVL III: ICD-10-PCS | Mod: PBBFAC,,, | Performed by: NURSE PRACTITIONER

## 2023-06-27 PROCEDURE — 99215 OFFICE O/P EST HI 40 MIN: CPT | Mod: Q1,S$PBB,, | Performed by: INTERNAL MEDICINE

## 2023-06-27 PROCEDURE — 99999 PR PBB SHADOW E&M-EST. PATIENT-LVL IV: CPT | Mod: PBBFAC,,, | Performed by: INTERNAL MEDICINE

## 2023-06-27 PROCEDURE — A4216 STERILE WATER/SALINE, 10 ML: HCPCS | Mod: Q1 | Performed by: INTERNAL MEDICINE

## 2023-06-27 PROCEDURE — 25000003 PHARM REV CODE 250: Mod: Q1 | Performed by: INTERNAL MEDICINE

## 2023-06-27 PROCEDURE — 99214 OFFICE O/P EST MOD 30 MIN: CPT | Mod: PBBFAC,27,25 | Performed by: INTERNAL MEDICINE

## 2023-06-27 PROCEDURE — 99214 PR OFFICE/OUTPT VISIT, EST, LEVL IV, 30-39 MIN: ICD-10-PCS | Mod: S$PBB,,, | Performed by: NURSE PRACTITIONER

## 2023-06-27 PROCEDURE — 99999 PR PBB SHADOW E&M-EST. PATIENT-LVL IV: ICD-10-PCS | Mod: PBBFAC,,, | Performed by: INTERNAL MEDICINE

## 2023-06-27 PROCEDURE — 63600175 PHARM REV CODE 636 W HCPCS: Mod: Q1 | Performed by: INTERNAL MEDICINE

## 2023-06-27 PROCEDURE — 99215 PR OFFICE/OUTPT VISIT, EST, LEVL V, 40-54 MIN: ICD-10-PCS | Mod: Q1,S$PBB,, | Performed by: INTERNAL MEDICINE

## 2023-06-27 PROCEDURE — 96413 CHEMO IV INFUSION 1 HR: CPT

## 2023-06-27 PROCEDURE — 99214 OFFICE O/P EST MOD 30 MIN: CPT | Mod: S$PBB,,, | Performed by: NURSE PRACTITIONER

## 2023-06-27 PROCEDURE — 99999 PR PBB SHADOW E&M-EST. PATIENT-LVL III: CPT | Mod: PBBFAC,,, | Performed by: NURSE PRACTITIONER

## 2023-06-27 RX ORDER — HEPARIN 100 UNIT/ML
500 SYRINGE INTRAVENOUS
Status: CANCELLED | OUTPATIENT
Start: 2023-06-27

## 2023-06-27 RX ORDER — HEPARIN 100 UNIT/ML
500 SYRINGE INTRAVENOUS
Status: DISCONTINUED | OUTPATIENT
Start: 2023-06-27 | End: 2023-06-27 | Stop reason: HOSPADM

## 2023-06-27 RX ORDER — DIPHENHYDRAMINE HYDROCHLORIDE 50 MG/ML
50 INJECTION INTRAMUSCULAR; INTRAVENOUS ONCE AS NEEDED
Status: DISCONTINUED | OUTPATIENT
Start: 2023-06-27 | End: 2023-06-27 | Stop reason: HOSPADM

## 2023-06-27 RX ORDER — EPINEPHRINE 0.3 MG/.3ML
0.3 INJECTION SUBCUTANEOUS ONCE AS NEEDED
Status: CANCELLED | OUTPATIENT
Start: 2023-06-27

## 2023-06-27 RX ORDER — EPINEPHRINE 0.3 MG/.3ML
0.3 INJECTION SUBCUTANEOUS ONCE AS NEEDED
Status: DISCONTINUED | OUTPATIENT
Start: 2023-06-27 | End: 2023-06-27 | Stop reason: HOSPADM

## 2023-06-27 RX ORDER — SODIUM CHLORIDE 0.9 % (FLUSH) 0.9 %
10 SYRINGE (ML) INJECTION
Status: CANCELLED | OUTPATIENT
Start: 2023-06-27

## 2023-06-27 RX ORDER — TESTOSTERONE 20.25 MG/1.25G
GEL TOPICAL
Qty: 2 EACH | Refills: 5 | Status: SHIPPED | OUTPATIENT
Start: 2023-06-27

## 2023-06-27 RX ORDER — SODIUM CHLORIDE 0.9 % (FLUSH) 0.9 %
10 SYRINGE (ML) INJECTION
Status: DISCONTINUED | OUTPATIENT
Start: 2023-06-27 | End: 2023-06-27 | Stop reason: HOSPADM

## 2023-06-27 RX ORDER — DIPHENHYDRAMINE HYDROCHLORIDE 50 MG/ML
50 INJECTION INTRAMUSCULAR; INTRAVENOUS ONCE AS NEEDED
Status: CANCELLED | OUTPATIENT
Start: 2023-06-27

## 2023-06-27 RX ADMIN — SODIUM CHLORIDE: 0.9 INJECTION, SOLUTION INTRAVENOUS at 11:06

## 2023-06-27 RX ADMIN — HEPARIN 500 UNITS: 100 SYRINGE at 12:06

## 2023-06-27 RX ADMIN — Medication 10 ML: at 12:06

## 2023-06-27 RX ADMIN — SODIUM CHLORIDE 1000 ML: 0.9 INJECTION, SOLUTION INTRAVENOUS at 11:06

## 2023-06-27 NOTE — PLAN OF CARE
Pt received [id number] today and tolerated well, without complications. Educated patient about [id number] (indications, side effects, possible reactions, chemotherapy precautions) and verbalized understanding.  VSS. CW port positive for blood return, saline flushed, Heparin flush instilled to dwell and de accessed prior to DC. Pt DC with no distress noted, ambulated off unit, w/o event, via self, pleased. Received 1L NS as well.

## 2023-06-27 NOTE — PROGRESS NOTES
CHIEF COMPLAINT:    Mr. Person is a 68 y.o. male presenting for male hypogonadism.      PRESENTING ILLNESS:    Lukasz Person is a 68 y.o. male with a PMH of afib, htn, hld, ckd 3, ed, diabetes mellitus, osmin who presents for male hypogonadism.    Established patient to urology. Presents today for male hypogonadism. Has been on TRT in the past and has more energy with TRT.  Currently on androgel 1.62% using 4 pumps.      Has a history of kidney stones and had left URS and stent placement on 2/25/22 with Dr. Hughes. 3 month f/u [facility] did not show stones, however, US shows a small right stone, but no bilateral hydronephrosis. This stone was previously seen on CT and does not appear to have grown in size. Remains asymptomatic.    Has history of bph with LUTs.  Prescribed tamsulosin, which he has been taking. Has no urinary complaints today.    No family history of prostate cancer.  Colon cancer grandfather and brother (dx at 31 y/o, currently undergoing tx for recurrence).    Reviewed PSA 0.86.    REVIEW OF SYSTEMS:    Review of Systems   Constitutional:  Negative for chills and fever.   Respiratory:  Negative for shortness of breath.    Cardiovascular:  Negative for chest pain.   Gastrointestinal:  Negative for constipation and diarrhea.   Genitourinary:  Negative for dysuria, flank pain, frequency, hematuria and urgency.   Neurological:  Negative for dizziness and weakness.     PATIENT HISTORY:    Past Medical History:   Diagnosis Date    Anticoagulant long-term use     Diabetes mellitus     Onset late 50s/early 60s    Hyperlipidemia     Hypertension     Onset late 50s/early 60s    Kidney stones     Sleep apnea     since 2006       Family History   Problem Relation Age of Onset    Arthritis Mother     Heart disease Father 70        CABG    Arthritis Father     Diabetes Father     Kidney disease Father         had one kidney removed in early thirties    Arthritis Sister     Arthritis Sister     Hypertension Sister     Colon  cancer Brother 32    Arthritis Brother     Alcohol abuse Paternal Aunt     Cancer Maternal Grandfather         throat cancer    Diabetes Paternal Grandmother     Colon polyps Paternal Grandfather     Colon cancer Paternal Grandfather     Cancer Paternal Grandfather         colon cancer at age 62       Allergies:  Patient has no known allergies.    Medications:    Current Outpatient Medications:     allopurinoL (ZYLOPRIM) 100 MG tablet, TAKE 1 TABLET BY MOUTH EVERY DAY, Disp: 30 tablet, Rfl: 10    blood sugar diagnostic (ACCU-CHEK ANTONELLA PLUS TEST STRP) Strp, Use to test CBG four times daily E11.65, Disp: 400 strip, Rfl: 1    blood-glucose meter kit, Checks blood sugars 1x/daily., Disp: 1 each, Rfl: 12    lancets (ACCU-CHEK SOFTCLIX LANCETS) Misc, Use to test CBG 4 times daily E11.65, Disp: 400 each, Rfl: 1    lisinopriL-hydrochlorothiazide (PRINZIDE,ZESTORETIC) 10-12.5 mg per tablet, Take 1 tablet by mouth once daily., Disp: 90 tablet, Rfl: 3    metFORMIN (GLUCOPHAGE) 1000 MG tablet, Take 1,000 mg by mouth 2 (two) times daily with meals. Pt takes half the dose (500 mg, BID), Disp: , Rfl:     metoprolol succinate (TOPROL-XL) 25 MG 24 hr tablet, Take 1 tablet (25 mg total) by mouth once daily., Disp: 30 tablet, Rfl: 11    nitroGLYCERIN (NITROSTAT) 0.4 MG SL tablet, Place 1 tablet (0.4 mg total) under the tongue every 5 (five) minutes as needed for Chest pain. If you need a third tablet, call 911, Disp: 60 tablet, Rfl: 12    omeprazole (PRILOSEC) 40 MG capsule, Take 1 capsule (40 mg total) by mouth once daily., Disp: 90 capsule, Rfl: 3    rivaroxaban (XARELTO) 20 mg Tab, Take 1 tablet (20 mg total) by mouth daily with dinner or evening meal., Disp: 30 tablet, Rfl: 11    rosuvastatin (CRESTOR) 20 MG tablet, TAKE 1 TABLET(20 MG) BY MOUTH EVERY DAY (Patient taking differently: Take 20 mg by mouth every evening.), Disp: 30 tablet, Rfl: 11    tamsulosin (FLOMAX) 0.4 mg Cap, Take 1 capsule (0.4 mg total) by mouth once daily.  (Patient taking differently: Take 0.4 mg by mouth nightly.), Disp: 30 capsule, Rfl: 11    testosterone (ANDROGEL) 20.25 mg/1.25 gram (1.62 %) GlPm, Apply 4 pumps to shoulders daily, Disp: 2 each, Rfl: 5    PHYSICAL EXAMINATION:    Physical Exam  Vitals and nursing note reviewed.   Constitutional:       Appearance: Normal appearance. He is well-developed.   HENT:      Head: Normocephalic and atraumatic.   Eyes:      Pupils: Pupils are equal, round, and reactive to light.   Pulmonary:      Effort: Pulmonary effort is normal.   Musculoskeletal:         General: Normal range of motion.      Cervical back: Normal range of motion.   Skin:     General: Skin is warm and dry.   Neurological:      Mental Status: He is alert and oriented to person, place, and time.   Psychiatric:         Behavior: Behavior normal.         LABS:    Lab Results   Component Value Date    PSA 1.2 10/06/2021    PSA 0.60 10/17/2020    PSADIAG 0.86 06/26/2023    PSADIAG 1.6 10/04/2022    PSADIAG 1.9 03/22/2022       IMPRESSION:  No diagnosis found.        PLAN:  Problem List Items Addressed This Visit    None        1. Male hypogonadism   - continue androgel 4 pumps daily, new script sent   - reviewed labs, recheck in 6 months   - HAYDEN completed 6/27/23  2. Bph with LUTs   Continue flomax  3. Rtc in 6 mths with labs prior    Ashlyn Chapa NP

## 2023-06-27 NOTE — PROGRESS NOTES
MEDICAL ONCOLOGY - ESTABLISHED PATIENT VISIT    Reason for visit: esophageal cancer    Best Contact Phone Number(s): There are no phone numbers on file.     Cancer/Stage/TNM:    Cancer Staging   Esophageal adenocarcinoma  Staging form: Esophagus - Adenocarcinoma, AJCC 8th Edition  - Pathologic stage from 3/28/2023: Stage II (ypT3, pN0, cM0, G2) - Signed by Santana Brown Jr., MD on 3/28/2023       Oncology History   Esophageal adenocarcinoma   12/8/2022 Initial Diagnosis    Esophageal adenocarcinoma     12/21/2022 - 12/21/2022 Chemotherapy    Treatment Summary   Plan Name: OP ESOPHAGEAL PACLITAXEL CARBOPLATIN WEEKLY  Treatment Goal: Curative  Status: Inactive  Start Date:   End Date:   Provider: Miki Medrano MD  Chemotherapy: CARBOplatin (PARAPLATIN) in sodium chloride 0.9% 250 mL chemo infusion, , Intravenous, Clinic/HOD 1 time, 0 of 1 cycle  PACLitaxeL (TAXOL) 50 mg/m2 = 120 mg in sodium chloride 0.9% 250 mL chemo infusion, 50 mg/m2, Intravenous, Clinic/HOD 1 time, 0 of 1 cycle     12/29/2022 - 1/20/2023 Chemotherapy    Treatment Summary   Plan Name: [facility] [id number] ARM B CARBOPLATIN PACLITAXEL NIVOLUMAB  Treatment Goal: Curative  Status: Inactive  Start Date: 12/29/2022  End Date: 1/20/2023  Provider: Miki Medrano MD  Chemotherapy: CARBOplatin (PARAPLATIN) 215 mg in sodium chloride 0.9% 306.5 mL chemo infusion, 215 mg, Intravenous, Clinic/HOD 1 time, 4 of 5 cycles  Administration: 215 mg (12/29/2022), 230 mg (1/5/2023), 265 mg (1/13/2023), 265 mg (1/20/2023)  PACLitaxeL (TAXOL) 50 mg/m2 = 120 mg in sodium chloride 0.9% 250 mL chemo infusion, 50 mg/m2 = 120 mg, Intravenous, Clinic/HOD 1 time, 4 of 5 cycles  Dose modification: 50 mg/m2 (original dose 50 mg/m2, Cycle 4)  Administration: 120 mg (12/29/2022), 120 mg (1/5/2023), 120 mg (1/13/2023), 120 mg (1/20/2023)     12/29/2022 - 2/1/2023 Radiation Therapy    Treating physician: Dr. Javier Moulton    Course: C1 CHEST 2022    Treatment Site Ref. ID  Energy Dose/Fx (Gy) #Fx Dose Correction (Gy) Total Dose (Gy) Start Date End Date Elapsed Days   IM Esophagus [id number] 6X 1.8 23 / 23 0 41.4 12/29/2022 2/1/2023 34        3/17/2023 Surgery    Procedure: Procedure(s) (LRB):  XI ROBOTIC ESOPHAGECTOMY (N/A)  ROBOTIC JEJUNOSTOMY TUBE INSERTION (N/A)      Surgeon(s) and Role:     * Santana Brown Jr., MD - Primary     * Darian Murphy MD - Assisting     Assistance: Due to having no qualified resident during critical portions of the case, Dr. Darian Murphy acted as an assistant.     Pre-Operative Diagnosis: Esophageal adenocarcinoma, distal 1/3     Post-Operative Diagnosis: Same     Pre-Operative Variables:  Stage: T3 N0  Adjacent organ involvement: None  Chemotherapy within 90 Days: Yes  Radiation Therapy within 90 Days: Yes     Comorbidities:  Morbid obesity  Type 2 diabetes mellitus  Obstructive sleep apnea  Gout  Hyperparathyroidism  Hypertension  Severe obesity  Coronary artery disease  Heart failure with reduced ejection fraction    Procedure:  Robotic-assisted Vj three field esophagectomy  Regional mediastinal lymph node dissection  Botox injection of the pylorus  Jejunostomy tube placement     Operative Findings:                No evidence of distant intraperitoneal metastases in the chest or abdomen  Esophageal tumor located in the distal third of the esophagus, mild radiation changes appreciated.  Resection status:  R0 pending final pathology.  No gross disease remaining post dissection.  Frozen sections on proximal and distal margins negative for malignancy  100u Botox injection into the pylorus  Stapled esophagogastrostomy performed at the neck incision after confirmation of negative margins.  Jejunostomy tube placed      3/28/2023 Cancer Staged    Staging form: Esophagus - Adenocarcinoma, AJCC 8th Edition  - Pathologic stage from 3/28/2023: Stage II (ypT3, pN0, cM0, G2)     5/1/2023 - 5/1/2023 Chemotherapy    Treatment Summary   Plan Name: [facility] [id number] ARM  "C ADJUVANT NIVOLUMAB  Treatment Goal: Curative  Status: Inactive  Start Date: 5/1/2023  End Date: 5/1/2023  Provider: Mkii Medrano MD  Chemotherapy: [No matching medication found in this treatment plan]     5/29/2023 -  Chemotherapy    Treatment Summary   Plan Name: [facility] [id number] ARM C ADJUVANT NIVOLUMAB STEP 2  Treatment Goal: Curative  Status: Active  Start Date: 5/29/2023  End Date: 4/29/2024 (Planned)  Provider: Miki Medrano MD  Chemotherapy: [No matching medication found in this treatment plan]          Interim History:   Mr. Person returns to clinic today prior to cycle 3 of adjuvant nivolumab.  He underwent robotic esophagectomy on 3/14/23 with Dr. Brown and J tube insertion.     He states he is taking more PO and his weight has stabilized around 200 to 203.  He had an esophageal dilation with Dr. Brown 5/31 which temporarily improved his dysphagia.  He states he feels "something in his throat" which he tries to clear with a cough or swallowing.  States is voice is improving as well.    Denies dyspnea, diarrhea.    No other new complaints.     Review of Systems   Constitutional:  Negative for malaise/fatigue and weight loss.   HENT:  Negative for sore throat.    Eyes:  Negative for blurred vision and double vision.   Respiratory:  Negative for cough and shortness of breath.    Cardiovascular:  Negative for chest pain.   Gastrointestinal:  Negative for abdominal pain and diarrhea.        Dysphagia   Genitourinary:  Negative for frequency.   Musculoskeletal:  Negative for back pain.   Skin:  Negative for rash.   Neurological:  Negative for headaches.   Psychiatric/Behavioral:  The patient is not nervous/anxious.      Past Medical History:   Past Medical History:   Diagnosis Date    Anticoagulant long-term use     Diabetes mellitus     Onset late 50s/early 60s    Hyperlipidemia     Hypertension     Onset late 50s/early 60s    Kidney stones     Sleep apnea     since 2006        Past Surgical " History:   Past Surgical History:   Procedure Laterality Date    CORONARY ANGIOGRAPHY N/A 9/30/2020    Procedure: ANGIOGRAM, CORONARY ARTERY;  Surgeon: John West MD;  Location: [facility] CATH LAB;  Service: Cardiology;  Laterality: N/A;    CYSTOSCOPY N/A 2/17/2022    Procedure: CYSTOSCOPY;  Surgeon: Lukasz Hughes MD;  Location: [facility] OR 1ST FLR;  Service: Urology;  Laterality: N/A;    CYSTOSCOPY W/ URETERAL STENT PLACEMENT N/A 2/25/2022    Procedure: CYSTOSCOPY, WITH URETERAL STENT INSERTION;  Surgeon: Lukasz Hughes MD;  Location: [facility] OR [facility] FLR;  Service: Urology;  Laterality: N/A;    ENDOSCOPIC ULTRASOUND OF UPPER GASTROINTESTINAL TRACT N/A 12/7/2022    Procedure: ULTRASOUND, UPPER GI TRACT, ENDOSCOPIC;  Surgeon: Darian Main MD;  Location: [facility] ENDO;  Service: Endoscopy;  Laterality: N/A;  Approval to hold Xarelto rec'd from Dr. Nick (see t/e 12/5/22)-DS    ESOPHAGOGASTRODUODENOSCOPY N/A 11/17/2022    Procedure: EGD (ESOPHAGOGASTRODUODENOSCOPY);  Surgeon: Brody Gonzales MD;  Location: [facility] (2ND FLR);  Service: Endoscopy;  Laterality: N/A;  inst via email-ok to hold Xarelto x 2 days-MS    ESOPHAGOGASTRODUODENOSCOPY N/A 5/31/2023    Procedure: EGD (ESOPHAGOGASTRODUODENOSCOPY) WITH DILATION;  Surgeon: Santana Brown Jr., MD;  Location: [facility] OR 2ND FLR;  Service: General;  Laterality: N/A;    LASER LITHOTRIPSY Left 2/25/2022    Procedure: LITHOTRIPSY, USING LASER;  Surgeon: Lukasz Hughes MD;  [facility];  Service: Urology;  Laterality: Left;    LEFT HEART CATHETERIZATION Left 9/30/2020    Procedure: Left heart cath;  Surgeon: John West MD;  Location: [facility] CATH LAB;  Service: Cardiology;  Laterality: Left;    LITHOTRIPSY      PARATHYROIDECTOMY  1/1/2-107    PYELOSCOPY Left 2/25/2022    Procedure: PYELOSCOPY;  Surgeon: Lukasz Hughes MD;  Location: [facility] OR [address] [place] [address];  Service: Urology;  Laterality: Left;    ROBOT-ASSISTED SURGICAL REMOVAL OF ESOPHAGUS  USING DA CARLOS XI N/A 3/14/2023    Procedure: XI ROBOTIC ESOPHAGECTOMY;  Surgeon: Santana Brown Jr., MD;  Location: [facility] OR 2ND FLR;  Service: General;  Laterality: N/A;  Abdomen, Chest and Neck    ROBOTIC JEJUNOSTOMY N/A 3/14/2023    Procedure: ROBOTIC JEJUNOSTOMY TUBE INSERTION;  Surgeon: Santana Brown Jr., MD;  Location: [facility] OR 2ND FLR;  Service: General;  Laterality: N/A;    TRANSESOPHAGEAL ECHOCARDIOGRAPHY N/A 4/7/2022    Procedure: ECHOCARDIOGRAM, TRANSESOPHAGEAL;  [facility];  Location: [facility] EP LAB;  Service: Cardiology;  Laterality: N/A;    TREATMENT OF CARDIAC ARRHYTHMIA N/A 4/7/2022    Procedure: Cardioversion or Defibrillation;  Surgeon: Iris Fisher NP;  Location: [facility] EP LAB;  Service: Cardiology;  Laterality: N/A;  afib, dccv, artie, anes, EH, 3prep    URETEROSCOPIC REMOVAL OF URETERIC CALCULUS Left 2/25/2022    Procedure: REMOVAL, CALCULUS, URETER, URETEROSCOPIC;  Surgeon: Lukasz Hughes MD;  Location: [facility] OR 1ST FLR;  Service: Urology;  Laterality: Left;    URETEROSCOPY Left 2/25/2022    Procedure: URETEROSCOPY;  Surgeon: Lukasz Hughes MD;  Location: [facility] OR 1ST FLR;  Service: Urology;  Laterality: Left;    VOCAL CORD INJECTION Left 3/17/2023    Procedure: INJECTION, VOCAL CORD, LARYNGOSCOPIC;  Surgeon: Gm Altman MD;  Location: [facility] OR 2ND FLR;  Service: ENT;  Laterality: Left;        Family History:   Family History   Problem Relation Age of Onset    Arthritis Mother     Heart disease Father 70        CABG    Arthritis Father     Diabetes Father     Kidney disease Father         had one kidney removed in early thirties    Arthritis Sister     Arthritis Sister     Hypertension Sister     Colon cancer Brother 32    Arthritis Brother     Alcohol abuse Paternal Aunt     Cancer Maternal Grandfather         throat cancer    Diabetes Paternal Grandmother     Colon polyps Paternal Grandfather     Colon cancer Paternal Grandfather     Cancer Paternal  Grandfather         colon cancer at age 62        Social History:   Social History     Tobacco Use    Smoking status: Former     Packs/day: 1.00     Years: 7.00     Pack years: 7.00     Types: Cigarettes, Cigars     Start date: 1972     Quit date:      Years since quittin.1     Passive exposure: Never    Smokeless tobacco: Never    Tobacco comments:     smoking was off and on.  cumulative 7 years.  never more than three years in one stretch or more lj   Substance Use Topics    Alcohol use: Not Currently      I have reviewed and updated the patient's past medical, surgical, family and social histories.    Allergies:   Review of patient's allergies indicates:  No Known Allergies     Medications:   Current Outpatient Medications   Medication Sig Dispense Refill    allopurinoL (ZYLOPRIM) 100 MG tablet TAKE 1 TABLET BY MOUTH EVERY DAY 30 tablet 10    blood sugar diagnostic (ACCU-CHEK ANTONELLA PLUS TEST STRP) Strp Use to test CBG four times daily E11.65 400 strip 1    blood-glucose meter kit Checks blood sugars 1x/daily. 1 each 12    lancets (ACCU-CHEK SOFTCLIX LANCETS) Misc Use to test CBG 4 times daily E11.65 400 each 1    lisinopriL-hydrochlorothiazide (PRINZIDE,ZESTORETIC) 10-12.5 mg per tablet Take 1 tablet by mouth once daily. 90 tablet 3    metFORMIN (GLUCOPHAGE) 1000 MG tablet Take 1,000 mg by mouth 2 (two) times daily with meals. Pt takes half the dose (500 mg, BID)      metoprolol succinate (TOPROL-XL) 25 MG 24 hr tablet Take 1 tablet (25 mg total) by mouth once daily. 30 tablet 11    nitroGLYCERIN (NITROSTAT) 0.4 MG SL tablet Place 1 tablet (0.4 mg total) under the tongue every 5 (five) minutes as needed for Chest pain. If you need a third tablet, call 911 60 tablet 12    omeprazole (PRILOSEC) 40 MG capsule Take 1 capsule (40 mg total) by mouth once daily. 90 capsule 3    rivaroxaban (XARELTO) 20 mg Tab Take 1 tablet (20 mg total) by mouth daily with dinner or evening meal. 30 tablet 11     rosuvastatin (CRESTOR) 20 MG tablet TAKE 1 TABLET(20 MG) BY MOUTH EVERY DAY (Patient taking differently: Take 20 mg by mouth every evening.) 30 tablet 11    tamsulosin (FLOMAX) 0.4 mg Cap Take 1 capsule (0.4 mg total) by mouth once daily. (Patient taking differently: Take 0.4 mg by mouth nightly.) 30 capsule 11    testosterone (ANDROGEL) 20.25 mg/1.25 gram (1.62 %) GlPm Apply 4 pumps to shoulders daily 2 each 5     No current facility-administered medications for this visit.        Physical Exam:   BP (!) 145/66 (BP Location: Left arm, Patient Position: Sitting, BP Method: Large (Automatic))   Pulse 65   Temp 97.7 °F (36.5 °C) (Oral)   Resp 18   Ht 6' (1.829 m)   Wt 90.9 kg (200 lb 8.1 oz)   SpO2 97%   BMI 27.19 kg/m²      ECOG Performance status: 0    Physical Exam  Vitals reviewed.   Constitutional:       General: He is not in acute distress.     Appearance: Normal appearance. He is not ill-appearing, toxic-appearing or diaphoretic.   HENT:      Head: Normocephalic and atraumatic.   Eyes:      General: No scleral icterus.     Extraocular Movements: Extraocular movements intact.      Conjunctiva/sclera: Conjunctivae normal.      Pupils: Pupils are equal, round, and reactive to light.   Neck:      Comments: L neck wound well-healed  Cardiovascular:      Rate and Rhythm: Normal rate and regular rhythm.      Heart sounds: Normal heart sounds. No murmur heard.  Pulmonary:      Effort: Pulmonary effort is normal. No respiratory distress.      Breath sounds: Normal breath sounds. No wheezing or rales.   Abdominal:      General: Bowel sounds are normal. There is no distension.      Palpations: Abdomen is soft.      Tenderness: There is no abdominal tenderness.   Musculoskeletal:         General: No swelling.      Cervical back: Normal range of motion.   Lymphadenopathy:      Cervical: No cervical adenopathy.   Skin:     Coloration: Skin is not jaundiced.      Findings: No bruising, erythema or rash.   Neurological:       General: No focal deficit present.      Mental Status: He is alert and oriented to person, place, and time. Mental status is at baseline.      Cranial Nerves: No cranial nerve deficit.      Motor: No weakness.      Gait: Gait normal.   Psychiatric:         Mood and Affect: Mood normal.         Behavior: Behavior normal.         Thought Content: Thought content normal.         Judgment: Judgment normal.       Labs:   Recent Results (from the past 48 hour(s))   PROSTATE SPECIFIC ANTIGEN, DIAGNOSTIC    Collection Time: 06/26/23  8:42 AM   Result Value Ref Range    PSA Diagnostic 0.86 0.00 - 4.00 ng/mL   Testosterone    Collection Time: 06/26/23  8:42 AM   Result Value Ref Range    Testosterone, Total 288 (L) 304 - 1227 ng/dL   CBC W/ AUTO DIFFERENTIAL    Collection Time: 06/26/23  8:42 AM   Result Value Ref Range    WBC 3.11 (L) 3.90 - 12.70 K/uL    RBC 3.84 (L) 4.60 - 6.20 M/uL    Hemoglobin 10.9 (L) 14.0 - 18.0 g/dL    Hematocrit 34.7 (L) 40.0 - 54.0 %    MCV 90 82 - 98 fL    MCH 28.4 27.0 - 31.0 pg    MCHC 31.4 (L) 32.0 - 36.0 g/dL    RDW 14.1 11.5 - 14.5 %    Platelets 154 150 - 450 K/uL    MPV 10.1 9.2 - 12.9 fL    Immature Granulocytes 0.3 0.0 - 0.5 %    Gran # (ANC) 2.3 1.8 - 7.7 K/uL    Immature Grans (Abs) 0.01 0.00 - 0.04 K/uL    Lymph # 0.3 (L) 1.0 - 4.8 K/uL    Mono # 0.4 0.3 - 1.0 K/uL    Eos # 0.2 0.0 - 0.5 K/uL    Baso # 0.02 0.00 - 0.20 K/uL    nRBC 0 0 /100 WBC    Gran % 74.3 (H) 38.0 - 73.0 %    Lymph % 8.4 (L) 18.0 - 48.0 %    Mono % 11.6 4.0 - 15.0 %    Eosinophil % 4.8 0.0 - 8.0 %    Basophil % 0.6 0.0 - 1.9 %    Differential Method Automated    COMPREHENSIVE METABOLIC PANEL    Collection Time: 06/26/23  8:42 AM   Result Value Ref Range    Sodium 143 136 - 145 mmol/L    Potassium 4.0 3.5 - 5.1 mmol/L    Chloride 107 95 - 110 mmol/L    CO2 28 23 - 29 mmol/L    Glucose 98 70 - 110 mg/dL    BUN 8 8 - 23 mg/dL    Creatinine 0.8 0.5 - 1.4 mg/dL    Calcium 9.1 8.7 - 10.5 mg/dL    Total Protein 6.3  6.0 - 8.4 g/dL    Albumin 3.4 (L) 3.5 - 5.2 g/dL    Total Bilirubin 0.7 0.1 - 1.0 mg/dL    Alkaline Phosphatase 123 55 - 135 U/L    AST 23 10 - 40 U/L    ALT 16 10 - 44 U/L    eGFR >60.0 >60 mL/min/1.73 m^2    Anion Gap 8 8 - 16 mmol/L   Magnesium    Collection Time: 23  8:42 AM   Result Value Ref Range    Magnesium 1.9 1.6 - 2.6 mg/dL   PHOSPHORUS    Collection Time: 23  8:42 AM   Result Value Ref Range    Phosphorus 2.7 2.7 - 4.5 mg/dL   TSH    Collection Time: 23  8:42 AM   Result Value Ref Range    TSH 0.350 (L) 0.400 - 4.000 uIU/mL   BILIRUBIN, DIRECT    Collection Time: 23  8:42 AM   Result Value Ref Range    Bilirubin, Direct 0.3 0.1 - 0.3 mg/dL   T4, Free    Collection Time: 23  8:42 AM   Result Value Ref Range    Free T4 1.11 0.71 - 1.51 ng/dL       I have reviewed the pertinent labs from 23 which are notable for mild anemia and leukopenia.  Cr 0.8, normal LFTs.  TSH 0.35 with normal Free T4.    Imagin2022 - EUS  Impression:             - Esophageal mucosal changes consistent with Paredes's esophagus.   - Partially obstructing, malignant esophageal tumor was found in the lower third of the esophagus.   - Normal stomach.   - Normal examined duodenum.   - A mass was found in the lower third of the esophagus. A tissue diagnosis was obtained prior to this exam. This is of adenocarcinoma. This was staged T3 (based on invasion into) N0 Mx by endosonographic criteria.   - No specimens collected.     3/1/22 - PET CT   Impression:     1.  Decreased uptake in persistent distal esophageal thickening suggestive of partial response to therapy.  Previous non hypermetabolic gastrohepatic and perigastric lymph nodes are stable to slightly decreased in size.     2.  No new FDG avid lesions to suggest mixed response to therapy.     3.  Incidental new hypermetabolic gluteal cutaneous thickening, likely benign.  Recommend correlation with history and physical examination.    Path:    3/14/23:  Final Pathologic Diagnosis 1. LYMPH NODE, LEVEL 7, EXCISION:   One benign lymph node (0/1)   2. TOTAL THORACIC ESOPHAGECTOMY AND PROXIMAL STOMACH:   Adenocarcinoma, moderately differentiated, 3.0 cm   Margins are negative   Tumor invades adventitia   Extensive residual cancer with no evident tumor regression (poor or no   response, score 3)   Eleven benign lymph nodes (0/11)   3. RESIDUAL CONDUIT, EXCISION:   Negative for malignancy   SYNOPTIC REPORT   Procedure - Esophageal gastrectomy   Tumor site - GE Junction   Relationship of tumor to esophagogastric junction - Lesion is central at GE   junction   Tumor size - 3.0 x 3.1cm   Histologic type - Adenocarcinoma   Histologic grade - Moderately differentiated   Microscopic tumor extension - Tumor invades adventitia   Margins - All margins of resection are uninvolved, proximal, distal and   adventitial margins are negative   Treatment effect - Extensive residual cancer with no evident tumor regression   (poor or no response, score 3)   Lymphovascular invasion - Not identified   Pathologic staging - yp T3 N0 Mx   Lymph nodes examined - 12   Lymph nodes involved - 0   Additional pathologic findings - Intestinal metaplasia present   MMR-IHC has been performed on previous biopsy ([id number]) and shows all 4   antibodies intact          Assessment:       1. Esophageal adenocarcinoma    2. Moderate malnutrition    3. Vocal cord paralysis    4. Hypertension associated with diabetes    5. Hyperlipidemia associated with type 2 diabetes mellitus    6. Type 2 diabetes mellitus with stage 3 chronic kidney disease, without long-term current use of insulin, unspecified whether stage 3a or 3b CKD    7. CKD stage 3 due to type 2 diabetes mellitus    8. Coronary artery disease involving native coronary artery of native heart without angina pectoris    9. Paroxysmal atrial fibrillation    10. HFrEF (heart failure with reduced ejection fraction)    11. Dental infection               Plan:     # Esophageal adenocarcinoma   Mr. Person is a pleasant 67 year old male who presents to our clinic for management of esophageal cancer. He initially presented with dysphagia, and further workup confirmed a stage II adenocarcinoma, pMMR. Tumor staged T3N0Mx by endosonographic criteria, JEVON. CT CAP on 12/14/22 confirmed no evidence of metastatic disease.    Previously conversation regarding his diagnosis and treatment options.  Recommended perioperative chemo/radiation per the CROSS trial. Discussed chemoradiation for ~5 weeks with 5 doses of weekly carboplatin and taxol administered. Plan to obtain restaging scans 4-5 weeks after radiation completed.     He was a candidate for a cooperative group trial assessing perioperative immunotherapy treatment. He consented for this study - ECOG-ACRIN [id number]: A Phase II/III Study of Dilcia-operative Nivolumab and Ipilimumab in Patients with Locoregional Esophageal and Gastroesophageal Junction Adenocarcinoma    Previously met with Dr. Santana Brown who agreed he was a surgical candidate.  Previously met with Dr. Javier Moulton who will be treating him with radiation.    Began cycle 1 carboplatin/paclitaxel/nivolumab on trial on 12/29/22.  Received cycle 4 of weekly chemotherapy on trial on 1/20/23.   We held chemotherapy for week 5 because of thrombocytopenia per protocol.    Completed radiation on 2/1/23.    Restaging PET/CT personally reviewed on 3/1/23 shows interval decreased FDG avidity in esophageal tumor, no new sites of disease.  CT CAP 3/2/23 shows stable esophageal thickening.    Underwent robotic esophagectomy on 3/14/23 with Dr. Brown.  Pathology showed negative margins, ypT3 N0 tumor with 0 of 12 lymph nodes involved.  No treatment effect was noted on the tumor tissue.    Discussed that per the [id number] protocol will proceed with randomization to adjuvant nivolumab +/- ipilimumab.  Patient randomized to receive adjuvant Nivolumab, started  cycle 1 at 480 mg q4 weeks 5/1/23.  Plan for twelve months per protocol.    Orders and labs reviewed.    Proceed with cycle 3 today at 480 mg q4 weeks.  Timeout occurred with me and Maria Esther SORTO.  Orders signed.    Underwent dilation with Dr. Brown on 5/31/23 with temporary improvement in dysphagia.  Weight has stabilized.  PD-L1 CPS 30.    RTC in 4 weeks.    # Vocal cord paralysis  Post-op. S/p injection by ENT.  Improving.    # HTN, HLD, DM, CKD  Following with PCP Dr. Wilson and nephrologist Dr. Oconnell.   BP mildly elevated today. Has been off his HCTZ/lisinopril because of prior hypotension.  Cr stable.    # CAD, A fib, CHF  Following with cardiologist Dr. Nick & EP Dr. Phillip.   Hold Xarelto temporarily for EGD.   Continue medical management.     # Dental Infection  Had another root canal  Should not interfere with nivolumab treatment.    Follow up: 4 weeks per research.    Miki Medrano MD  Hematology/Oncology  [facility]    Route Chart for Scheduling    Med Onc Chart Routing      Follow up with physician . Per research   Follow up with SAMUEL    Infusion scheduling note    Injection scheduling note    Labs    Imaging    Pharmacy appointment    Other referrals            Treatment Plan Information   [facility] [id number] ARM C ADJUVANT NIVOLUMAB STEP 2   Miki Medrano MD   Upcoming Treatment Dates - [facility] [id number] ARM C ADJUVANT NIVOLUMAB STEP 2    6/26/2023       Chemotherapy       INV nivolumab 480 mg in INV sodium chloride 0.9 % 100 mL chemo infusion  7/24/2023       Chemotherapy       INV nivolumab 480 mg in INV sodium chloride 0.9 % 100 mL chemo infusion  8/21/2023       Chemotherapy       INV nivolumab 480 mg in INV sodium chloride 0.9 % 100 mL chemo infusion  9/18/2023       Chemotherapy       INV nivolumab 480 mg in INV sodium chloride 0.9 % 100 mL chemo infusion    Supportive Plan Information  IV FLUIDS AND ELECTROLYTES   Miki Medrano MD   Upcoming Treatment  Dates - IV FLUIDS AND ELECTROLYTES    No upcoming days in selected categories.    Therapy Plan Information  Flushes  heparin, porcine (PF) 100 unit/mL injection flush 500 Units  500 Units, Intravenous, Every visit  sodium chloride 0.9% flush 10 mL  10 mL, Intravenous, Every visit

## 2023-06-27 NOTE — PROGRESS NOTES
: URIEL Manriquez MD  Treating Investigators: NIRAV Medrano MD  Surgical Oncologist: SARAH Brown M.D. 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 Patient queried & verbalized his consent for continued participation in above-mentioned trial. Patient queried and continues to report dysphagia (improving), dysgeusia, hoarseness (improving), fatigue, and intermittent nausea. Patient confirms that he has not re-started his Metformin nor his Metoprolol for hypertension. Patient denies any other updates to his ConMeds.    Patient completed safety labs, VS, PE & ECOG (ECOG = 0, per Dr. Medrano) today per protocol. Dr. Medrano assessed all patient's labs, VS & PE findings as either WNL or NCS, deeming patient eligible to continue treatment with Nivolumab monotherapy today.   Patient's baseline weight at Step 1 Week 1 was 117.2 kg and his Step 2 C1D1 was 99.2 kg ( +/- 10% change from baseline 89.3 - 109.2 kg). Today's weight is 90.9 kg, which is >20% weight loss since Step 1 Week 1 & is <10% change from Step 2 Cycle 1.      Per protocol, Nivolumab is administered at a 480 mg flat dose & cannot be modified. [facility] & Dr. Medrano performed time-out in exam room.      Infusion RN Froilan Light was instructed to administer the treatment per the treatment plan. RN expressed their understanding of all instructions. Patient completed treatment today without event & was DC'd from clinic ambulatory and in stable condition. Please see Infusion RN note for further information.    Patient was encouraged  to contact [facility] or Dr. Medrano's team with any questions or concerns & was reminded of clinic's 24/7 emergency contact number. Patient verbalized understanding & denies any additional questions at this time.      Step 2  C4D1:  [id number] @ 0940 - Safety labs [facility] 3rd floor  [id number] @ 0900 - [facility] 2nd floor  [id number] @ 1300 - infusion (Nivolumab only)        Solicited AEs -- Assessed 29May2023:  ** Anemia - Grade 1 -- (NCS per MD) -- continued from prior visit  ** Platelet count decreased - Grade 0 -- Grade 1, NCS per MD  Platelets returned to WNL 6/26/2023  ** White blood cell count decreased - Grade 0 -- Grade 1, NCS per MD  Neutrophil count decreased - Grade 0  ** Lymphocyte count decreased - Grade 2   (NCS per MD)    6/26/2023 = Grade 3 -- NCS per MD  Peripheral motor neuropathy - Grade 0   Peripheral sensory neuropathy - Grade 0   Creatinine increased - Grade 0  Hypothyroidism - Grade 0   (TSH WNL on 11Apr2023)  Diarrhea (patient baseline BM = 2 stools a day) - Grade 0 -- reports loose stools vs. diarrhea   **Fatigue - Grade 2   **Nausea - Grade 1  **Cough - Grade 1  -- Medical History  Pneumonitis - Grade 0  **Hyperglycemia - Grade 1 -- Medical History  -- patient DC'd Metformin x 2 weeks with mild non-fasting glucose elevation on 29May2023 labs (29May2023 HbA1c = 6.2)  Adrenal Insufficiency - Grade 0  Rash-maculo-papular - Grade 0  Pruritis - Grade 0  Erythema multiforme - Grade 0  Vomiting - Grade 0    Lipase increased -- Not required per study calendar and therefore not assessed this visit.      Ongoing Non-Solicited AEs:  Anorexia - Grade 1 - 09Jan2023 - ongoing (no treatment)  Dysgeusia - Grade 1 - 14Mar2023 - ongoing ( no treatment )  Generalized weakness - Grade 1 - [id number] - ongoing ( no treatment )  Nausea - Grade 1 - [id number] - ongoing ( no treatment )  Hoarseness - Grade 2 - 14Mar2023 - ongoing ( no treatment )    New or Changed AEs Since Last Visit:  Dysphagia - Grade 2 - 15May2023 -  [id number]  Dysphagia - Grade 1 - [id number] - ongoing    Dizziness, int. - Grade 1 - [id number] - ongoing -- DC'd 15Jun2023       Complete Baseline Medical History, Adverse Events, and Concomitant Medications are located in patient's shadow chart

## 2023-06-30 ENCOUNTER — TELEPHONE (OUTPATIENT)
Dept: CARDIOLOGY | Facility: CLINIC | Age: 68
End: 2023-06-30
Payer: MEDICARE

## 2023-06-30 ENCOUNTER — PATIENT MESSAGE (OUTPATIENT)
Dept: CARDIOLOGY | Facility: CLINIC | Age: 68
End: 2023-06-30
Payer: MEDICARE

## 2023-06-30 ENCOUNTER — PATIENT MESSAGE (OUTPATIENT)
Dept: ELECTROPHYSIOLOGY | Facility: CLINIC | Age: 68
End: 2023-06-30
Payer: MEDICARE

## 2023-06-30 DIAGNOSIS — I48.0 PAROXYSMAL ATRIAL FIBRILLATION: Primary | ICD-10-CM

## 2023-06-30 RX ORDER — WARFARIN SODIUM 5 MG/1
5 TABLET ORAL DAILY
Qty: 30 TABLET | Refills: 3 | Status: SHIPPED | OUTPATIENT
Start: 2023-06-30 | End: 2023-07-25 | Stop reason: ALTCHOICE

## 2023-06-30 NOTE — TELEPHONE ENCOUNTER
Pt is being provided w a 30 day card for Xarelto. I will also give him a prescription for warfarin In case the card does not work (its Fri afternoon) and instruct him on the use of warfarin. I will enroll him in the Coumadin clinic if he chooses warfarin over Xarelto

## 2023-06-30 NOTE — TELEPHONE ENCOUNTER
Called pt to discuss options. Spoke with pt. And he is currently at Dr Nick's office getting a 30 day free coupon to get Xarelto filled. Pt states this should hold him over until patient assistance gets completed.

## 2023-06-30 NOTE — TELEPHONE ENCOUNTER
----- Message from Lorin Garcia sent at 6/30/2023  1:53 PM CDT -----  Regarding: please see portal  The pt is calling about his portal message. Please call him back @ [phone]. Thanks, Lorin

## 2023-06-30 NOTE — TELEPHONE ENCOUNTER
Pt came in to the office to  the Xarelto card/prescription and prescription for Coumadin. I explained to the pt that the Xarelto was to be filled first and if the card did not work for him to get the free 30 day trial only then was he to fill the prescription for the Coumadin. Pt was made aware that he should not have both filled or to take both prescriptions. Pt verbalized understanding.

## 2023-07-05 ENCOUNTER — PATIENT MESSAGE (OUTPATIENT)
Dept: ADMINISTRATIVE | Facility: OTHER | Age: 68
End: 2023-07-05
Payer: MEDICARE

## 2023-07-05 NOTE — TELEPHONE ENCOUNTER
Unfortunately, The Pharmacy Patient Assistance Team is unable to assist Mr. Person at this time due to the following reasons      Patient exceeds the income with the [employer] drug program for Xarelto.        Pharmacy Patient Assistance Team

## 2023-07-07 ENCOUNTER — CLINICAL SUPPORT (OUTPATIENT)
Dept: HEMATOLOGY/ONCOLOGY | Facility: CLINIC | Age: 68
End: 2023-07-07
Payer: MEDICARE

## 2023-07-07 DIAGNOSIS — E44.0 MODERATE MALNUTRITION: ICD-10-CM

## 2023-07-07 DIAGNOSIS — C15.9 ESOPHAGEAL ADENOCARCINOMA: Primary | ICD-10-CM

## 2023-07-07 DIAGNOSIS — Z71.3 NUTRITIONAL COUNSELING: ICD-10-CM

## 2023-07-07 PROCEDURE — 97803 MED NUTRITION INDIV SUBSEQ: CPT | Mod: 95,,, | Performed by: DIETITIAN, REGISTERED

## 2023-07-07 PROCEDURE — 97803 PR MED NUTR THER, SUBSQ, INDIV, EA 15 MIN: ICD-10-PCS | Mod: 95,,, | Performed by: DIETITIAN, REGISTERED

## 2023-07-07 NOTE — PROGRESS NOTES
Oncology Nutrition Assessment for Medical Nutrition Therapy  Follow-up Visit    Lukasz Person   1955    Referring Provider: No ref. provider found      Reason for Visit: nutrition counseling and education    The patient location is: [place]  The chief complaint leading to consultation is: nutrition follow-up    Visit type: audiovisual    Face to Face time with patient: 14 minutes  20 minutes of total time spent on the encounter, which includes face to face time and non-face to face time preparing to see the patient (eg, review of tests), Obtaining and/or reviewing separately obtained history, Documenting clinical information in the electronic or other health record, Independently interpreting results (not separately reported) and communicating results to the patient/family/caregiver, or Care coordination (not separately reported).     Each patient to whom he or she provides medical services by telemedicine is:  (1) informed of the relationship between the physician and patient and the respective role of any other health care provider with respect to management of the patient; and (2) notified that he or she may decline to receive medical services by telemedicine and may withdraw from such care at any time.    PMHx:   Past Medical History:   Diagnosis Date    Anticoagulant long-term use     Diabetes mellitus     Onset late 50s/early 60s    Hyperlipidemia     Hypertension     Onset late 50s/early 60s    Kidney stones     Sleep apnea     since 2006       Nutrition Assessment    This is a 68 y.o.male with a medical diagnosis of esophageal adenocarcinoma s/p neoadjuvant chemoradiation and now s/p esophagectomy 3/14. He is known to me from previous appointments. At our last appointment, he had lost ~20lb over 1.5 months. Today he reports his weight has been stable over the past month (about 199lb). He is now s/p cycle 3 adjuvant Opdivo. He reports still limited as to how much he can eat at a meal. He reports still having  feeling of something stuck in throat. He is eating small frequent meals/snacks every 2 hours from 8a-8p. He was able to eat a porkchop the other day without issue. He did not tolerate burnt ends though. He is drinking plenty of water, 1/2 gallon of water with a splash of cranapple juice, as well as tea. Still not supplementing with Boost but is eating Power Bars.  He does report continued fatigue and decreased balance. He wonders if it is related to muscle loss. He would like to start moving more and increasing his muscle.    Weight: 90.9 kg (200 lb 8.1 oz) - 6/27 clinic weight  Height: 6' (182.9 cm)  BMI: 27.2    Usual BW: 260-265lb  Weight Change: 30lb loss over 3 months (15lb of which were lost after surgery); another 20lb loss over 1.5 months    Allergies: Patient has no known allergies.    Current Medications:  Current Outpatient Medications:     allopurinoL (ZYLOPRIM) 100 MG tablet, TAKE 1 TABLET BY MOUTH EVERY DAY, Disp: 30 tablet, Rfl: 10    blood sugar diagnostic (ACCU-CHEK ANTONELLA PLUS TEST STRP) Strp, Use to test CBG four times daily E11.65, Disp: 400 strip, Rfl: 1    blood-glucose meter kit, Checks blood sugars 1x/daily., Disp: 1 each, Rfl: 12    lancets (ACCU-CHEK SOFTCLIX LANCETS) Misc, Use to test CBG 4 times daily E11.65, Disp: 400 each, Rfl: 1    lisinopriL-hydrochlorothiazide (PRINZIDE,ZESTORETIC) 10-12.5 mg per tablet, Take 1 tablet by mouth once daily., Disp: 90 tablet, Rfl: 3    metFORMIN (GLUCOPHAGE) 1000 MG tablet, Take 1,000 mg by mouth 2 (two) times daily with meals. Pt takes half the dose (500 mg, BID), Disp: , Rfl:     metoprolol succinate (TOPROL-XL) 25 MG 24 hr tablet, Take 1 tablet (25 mg total) by mouth once daily., Disp: 30 tablet, Rfl: 11    nitroGLYCERIN (NITROSTAT) 0.4 MG SL tablet, Place 1 tablet (0.4 mg total) under the tongue every 5 (five) minutes as needed for Chest pain. If you need a third tablet, call 911, Disp: 60 tablet, Rfl: 12    omeprazole (PRILOSEC) 40 MG capsule, Take  1 capsule (40 mg total) by mouth once daily., Disp: 90 capsule, Rfl: 3    rivaroxaban (XARELTO) 20 mg Tab, Take 1 tablet (20 mg total) by mouth daily with dinner or evening meal., Disp: 30 tablet, Rfl: 3    rosuvastatin (CRESTOR) 20 MG tablet, TAKE 1 TABLET(20 MG) BY MOUTH EVERY DAY (Patient taking differently: Take 20 mg by mouth every evening.), Disp: 30 tablet, Rfl: 11    tamsulosin (FLOMAX) 0.4 mg Cap, Take 1 capsule (0.4 mg total) by mouth once daily. (Patient taking differently: Take 0.4 mg by mouth nightly.), Disp: 30 capsule, Rfl: 11    testosterone (ANDROGEL) 20.25 mg/1.25 gram (1.62 %) GlPm, Apply 4 pumps to shoulders daily, Disp: 2 each, Rfl: 5    warfarin (COUMADIN) 5 MG tablet, Take 1 tablet (5 mg total) by mouth Daily., Disp: 30 tablet, Rfl: 3    Food/medication interactions noted: none    Vitamins/Supplements: none    Labs: Reviewed    Nutrition Diagnosis    Problem: moderate malnutrition  Etiology (related to): appetite loss and early satiety  Signs/Symptoms (as evidenced by): reports of inadequate PO intake, weight loss, and both fat & muscle wasting present  Status: Improving    Nutrition Intervention    Nutrition Prescription   2273 kcals (25kcal/kg)  91g protein (1g/kg)   2273mL fluid (25mL/kg)    Recommendations:  Continue eating small frequent meals/snacks - at least 6/day  Make meals/snacks high in calories and protein - examples discussed  Continue to drink at least 64oz fluid/day  Agree with more movement but discussed the importance of increase PO intake with this    Materials Provided/Reviewed: none    Nutrition Monitoring and Evaluation    Monitor: diet education needs, energy intake, and weight status    Goals: weight maintenance    Follow up: in 3 months    Communication to referring provider/care team: note available in chart; message sent to JULIETA Darden RN regarding patient's questions about yoga and meditation    Counseling time: 14 minutes    Carmen Clark, MS, RD, LDN

## 2023-07-09 ENCOUNTER — PATIENT MESSAGE (OUTPATIENT)
Dept: ADMINISTRATIVE | Facility: OTHER | Age: 68
End: 2023-07-09
Payer: MEDICARE

## 2023-07-10 ENCOUNTER — PATIENT MESSAGE (OUTPATIENT)
Dept: ADMINISTRATIVE | Facility: OTHER | Age: 68
End: 2023-07-10
Payer: MEDICARE

## 2023-07-11 ENCOUNTER — PATIENT MESSAGE (OUTPATIENT)
Dept: ADMINISTRATIVE | Facility: OTHER | Age: 68
End: 2023-07-11
Payer: MEDICARE

## 2023-07-12 ENCOUNTER — PATIENT MESSAGE (OUTPATIENT)
Dept: ADMINISTRATIVE | Facility: OTHER | Age: 68
End: 2023-07-12
Payer: MEDICARE

## 2023-07-13 ENCOUNTER — PATIENT MESSAGE (OUTPATIENT)
Dept: ADMINISTRATIVE | Facility: OTHER | Age: 68
End: 2023-07-13
Payer: MEDICARE

## 2023-07-14 ENCOUNTER — PATIENT MESSAGE (OUTPATIENT)
Dept: NEPHROLOGY | Facility: CLINIC | Age: 68
End: 2023-07-14
Payer: MEDICARE

## 2023-07-14 ENCOUNTER — PATIENT MESSAGE (OUTPATIENT)
Dept: INTERNAL MEDICINE | Facility: CLINIC | Age: 68
End: 2023-07-14
Payer: MEDICARE

## 2023-07-14 ENCOUNTER — PATIENT MESSAGE (OUTPATIENT)
Dept: CARDIOLOGY | Facility: CLINIC | Age: 68
End: 2023-07-14
Payer: MEDICARE

## 2023-07-14 RX ORDER — HYDROCHLOROTHIAZIDE 25 MG/1
25 TABLET ORAL DAILY
Qty: 30 TABLET | Refills: 11 | Status: SHIPPED | OUTPATIENT
Start: 2023-07-14

## 2023-07-14 RX ORDER — HYDROCHLOROTHIAZIDE 25 MG/1
25 TABLET ORAL DAILY
COMMUNITY
End: 2023-07-14 | Stop reason: SDUPTHER

## 2023-07-14 NOTE — TELEPHONE ENCOUNTER
Pt calling with complaint of lower extremity swelling.  notified, advised pt begin HCTZ 25mg daily. Pt notified, verbalized understanding.

## 2023-07-19 ENCOUNTER — OFFICE VISIT (OUTPATIENT)
Dept: INTERNAL MEDICINE | Facility: CLINIC | Age: 68
End: 2023-07-19
Payer: MEDICARE

## 2023-07-19 ENCOUNTER — PATIENT MESSAGE (OUTPATIENT)
Dept: ADMINISTRATIVE | Facility: OTHER | Age: 68
End: 2023-07-19
Payer: MEDICARE

## 2023-07-19 VITALS
WEIGHT: 199.31 LBS | BODY MASS INDEX: 27 KG/M2 | SYSTOLIC BLOOD PRESSURE: 110 MMHG | TEMPERATURE: 98 F | HEIGHT: 72 IN | RESPIRATION RATE: 17 BRPM | OXYGEN SATURATION: 98 % | DIASTOLIC BLOOD PRESSURE: 70 MMHG | HEART RATE: 51 BPM

## 2023-07-19 DIAGNOSIS — E11.22 TYPE 2 DIABETES MELLITUS WITH STAGE 3A CHRONIC KIDNEY DISEASE, WITHOUT LONG-TERM CURRENT USE OF INSULIN: Primary | ICD-10-CM

## 2023-07-19 DIAGNOSIS — I50.20 HFREF (HEART FAILURE WITH REDUCED EJECTION FRACTION): ICD-10-CM

## 2023-07-19 DIAGNOSIS — E11.59 HYPERTENSION ASSOCIATED WITH DIABETES: ICD-10-CM

## 2023-07-19 DIAGNOSIS — E78.5 HYPERLIPIDEMIA ASSOCIATED WITH TYPE 2 DIABETES MELLITUS: ICD-10-CM

## 2023-07-19 DIAGNOSIS — N18.31 TYPE 2 DIABETES MELLITUS WITH STAGE 3A CHRONIC KIDNEY DISEASE, WITHOUT LONG-TERM CURRENT USE OF INSULIN: Primary | ICD-10-CM

## 2023-07-19 DIAGNOSIS — I15.2 HYPERTENSION ASSOCIATED WITH DIABETES: ICD-10-CM

## 2023-07-19 DIAGNOSIS — E11.69 HYPERLIPIDEMIA ASSOCIATED WITH TYPE 2 DIABETES MELLITUS: ICD-10-CM

## 2023-07-19 DIAGNOSIS — Z79.01 CHRONIC ANTICOAGULATION: ICD-10-CM

## 2023-07-19 PROCEDURE — 99999 PR PBB SHADOW E&M-EST. PATIENT-LVL V: CPT | Mod: PBBFAC,,, | Performed by: NURSE PRACTITIONER

## 2023-07-19 PROCEDURE — 99214 PR OFFICE/OUTPT VISIT, EST, LEVL IV, 30-39 MIN: ICD-10-PCS | Mod: S$PBB,,, | Performed by: NURSE PRACTITIONER

## 2023-07-19 PROCEDURE — 99215 OFFICE O/P EST HI 40 MIN: CPT | Mod: PBBFAC,PO | Performed by: NURSE PRACTITIONER

## 2023-07-19 PROCEDURE — 99999 PR PBB SHADOW E&M-EST. PATIENT-LVL V: ICD-10-PCS | Mod: PBBFAC,,, | Performed by: NURSE PRACTITIONER

## 2023-07-19 PROCEDURE — 99214 OFFICE O/P EST MOD 30 MIN: CPT | Mod: S$PBB,,, | Performed by: NURSE PRACTITIONER

## 2023-07-19 NOTE — PROGRESS NOTES
"68 y.o. male routine follow up for management of T2dm -   Last seen in 2022 - those notes are below    Hgba1c is at 6.2% -   He's always been very well controlled.   Reports in the interim, was diagnosed with Esophageal cancer/adenocarcinoma.   Suffered vocal cord and swallowing issues among many...   Lost a lot of weight b/c of his treatment.   Was at 263 lbs originally - now in office is 199 lbs today.   Says he is still losing weight - in part due to his diet after surgery.     Has trouble eating large portions basically following treatment.    underwent chemo in 2022 -   Then had procedure as follows in march 2023 -   XI ROBOTIC ESOPHAGECTOMY (N/A)  ROBOTIC JEJUNOSTOMY TUBE INSERTION (N/A)   Overall, his portions remain very small and frequent - he's lost a lot of appetite too.     his blood sugars are controlled -   He checks 1 time per day and reports low 100's.   He's been off of oral anti-diabetic medications completely for 3 months steadily now.   (Prior was on metformin 1000mg twice per day and amaryl tablet daily in the morning).     He has cardiac history and history of heart failure.   Afib history on xarelto for this. got it for free for 1 year - then tried to reapply for it  - was told didn't qualify.    - dr. Nick has seen him in the past - he reports trying to schedule a follow up soon with him.   Was prescribed coumadin in the interim in case he "runs out" of the xarelto - but isn't taking it  He continues on toprol XL 25mg tablet daily - pulse is 51 -reports his pulse always runs fairly low  Is beginning on hctz 25mg tablet daily for swelling in his legs,   (Formerly on lisinopril/hctz combo) -   His bp is stable/lower end at 110/70 -     He has an appt. With his PCP upcoming - Dr. Wilson.   He is trying to ensure he re-evaluates what medications he needs/doesn't need.     Last visit notes from 2022:   67 y.o. male here for 7 month f/u for management of T2dm -   Last seen in November 2021 - those " notes below.     a1c reduced again from 7% to 6.8%.   He continues on same meds:   Metformin 1000 mg twice per day.   Also on glimepiride 4 mg tablet in morning or at lunch - first meal of day. (he was previously taking at night time).   We had discussed option of trulicity in the past, but he has preferred to work on his own lifestyle/dietary habit changes.   Is motivated to lose some weight, but doesn't like the idea of additional medications just yet.     In the interim, he was diagnosed with a kidney stone - ended up infected, and was in sepsis.   He also developed afib when hospitalized. Had cardioversion.   Is now on xarelto and amiodarone.   He is finally home, and he is feeling much better.   Lost weight while he was in the hospital, but seems to be steadily gaining it back    He is checking the blood sugars 1 - 2 times per day - and keeps a book/log of it. -   He varies between 110 - 130's for the most part.   Is concerned that the morning glucose readings tend to be higher than the evening readings...   He went up to 150 -170's on several occasions, but these are sparingly.   He seemed to be the lowest in the 100's after he lost weight following the hospital stay.                   Last visit notes from November 2021 as follows  Ellis yCarmelinaoCarmelina morris, here for 5 month follow up visit for management of T2dm -   Last seen in June 2021 - those notes are below.     a1c is @ 7%.   Continues on same medications -   Metformin 1000 mg twice per day.   Also on glimepiride 4 mg tablet in morning.   Has gotten a bit off track with eating habits -   Is eating late at night - snacking 10/11pm.   Not exercising currently.   Checking sugars 1 - 2 times per day.   Keeps a log - see below.                      Last visit notes from June 2021:   Ellis y.o. gentleman, here for follow up office visit for management of T2dm.   Last seen in December 2020 - those notes are below.     a1c remains stable @ 6.9%.   Continues on metformin  1000 mg twice per day.   Also on glimepiride 4mg tablet in am -    Takes medications/does not skip.   Now is established with Dr. Wilson - following for primary care.   Staying active/following ADA diet.   Checks sugars daily in morning. See logs below. Running 120 - 130's on average.   No acute complaints today's visit -                         Last visit notes as follows from December 2020:   65 y.o. gentleman, here for 3 month follow up visit for T2dm.   Last seen 9/11/2020 - those notes below.     a1c slightly improved from 7.1%---> 6.8%.   Continues on metformin 1000 bid.   On glimepiride 2mg in am with breakfast . Had been taking in evening before and I switched him to morning time to help cover prandial highs early on in the day.   This seems to have helped.   He is trying to follow ADA diet.   Lipids - at goal. Now has restarted his crestor 20mg every Hs.   BP controlled on lisinopril/hctz.   No acute complaints today's visit.       Last visit notes from 9/11/2020 as follows:   HPI: Lukasz Person is a 68 y.o.  male c/I for visit to address Diabetes Type 2  This is the first time I am seeing this patient.   Does not have a pcp, but getting established with Dr. Wilson next month.     was diagnosed with T2DM about 10 years ago.   Has never been hospitalized r/t DM.  Taking metformin 1000mg po bid and also on glimepiride 2mg tablets - taking 2 after dinner.   Denies missing doses of DM medication.   Current a1c is controlled at 7.1%.   Coming in today to establish care, and get further recommendations for management of his diabetes.   He is very motivated to lose weight, eat healthier and manage his over all health more effectively.     Past medical History:   Past Medical History:   Diagnosis Date    Anticoagulant long-term use     Diabetes mellitus     Onset late 50s/early 60s    Hyperlipidemia     Hypertension     Onset late 50s/early 60s    Kidney stones     Sleep apnea     since 2006      Family hx:   Family  History   Problem Relation Age of Onset    Arthritis Mother     Heart disease Father 70        CABG    Arthritis Father     Diabetes Father     Kidney disease Father         had one kidney removed in early thirties    Arthritis Sister     Arthritis Sister     Hypertension Sister     Colon cancer Brother 32    Arthritis Brother     Alcohol abuse Paternal Aunt     Cancer Maternal Grandfather         throat cancer    Diabetes Paternal Grandmother     Colon polyps Paternal Grandfather     Colon cancer Paternal Grandfather     Cancer Paternal Grandfather         colon cancer at age 62      Current meds:   Current Outpatient Medications:     allopurinoL (ZYLOPRIM) 100 MG tablet, TAKE 1 TABLET BY MOUTH EVERY DAY, Disp: 30 tablet, Rfl: 10    blood sugar diagnostic (ACCU-CHEK ANTONELLA PLUS TEST STRP) Strp, Use to test CBG four times daily E11.65, Disp: 400 strip, Rfl: 1    blood-glucose meter kit, Checks blood sugars 1x/daily., Disp: 1 each, Rfl: 12    hydroCHLOROthiazide (HYDRODIURIL) 25 MG tablet, Take 1 tablet (25 mg total) by mouth once daily., Disp: 30 tablet, Rfl: 11    lancets (ACCU-CHEK SOFTCLIX LANCETS) Misc, Use to test CBG 4 times daily E11.65, Disp: 400 each, Rfl: 1    nitroGLYCERIN (NITROSTAT) 0.4 MG SL tablet, Place 1 tablet (0.4 mg total) under the tongue every 5 (five) minutes as needed for Chest pain. If you need a third tablet, call 911, Disp: 60 tablet, Rfl: 12    omeprazole (PRILOSEC) 40 MG capsule, Take 1 capsule (40 mg total) by mouth once daily., Disp: 90 capsule, Rfl: 3    rivaroxaban (XARELTO) 20 mg Tab, Take 1 tablet (20 mg total) by mouth daily with dinner or evening meal., Disp: 30 tablet, Rfl: 3    rosuvastatin (CRESTOR) 20 MG tablet, TAKE 1 TABLET(20 MG) BY MOUTH EVERY DAY (Patient taking differently: Take 20 mg by mouth every evening.), Disp: 30 tablet, Rfl: 11    tamsulosin (FLOMAX) 0.4 mg Cap, Take 1 capsule (0.4 mg total) by mouth once daily. (Patient taking differently: Take 0.4 mg by mouth  nightly.), Disp: 30 capsule, Rfl: 11    testosterone (ANDROGEL) 20.25 mg/1.25 gram (1.62 %) GlPm, Apply 4 pumps to shoulders daily, Disp: 2 each, Rfl: 5    warfarin (COUMADIN) 5 MG tablet, Take 1 tablet (5 mg total) by mouth Daily., Disp: 30 tablet, Rfl: 3    metoprolol succinate (TOPROL-XL) 25 MG 24 hr tablet, Take 1 tablet (25 mg total) by mouth once daily., Disp: 30 tablet, Rfl: 11     Current Diabetes medications:   None.   Was prior on:   Metformin 1000 bid  Amaryl 4 mg tablet in morning. Or with lunch time meal.     Review of Pertinent co-morbidities/risk factors:   CV: Denies history of MI nor stroke.   CAD: Denies.  Does not take aspirin 81mg tablet daily  BP: has history of HTN  Statin: Taking  ACE/ARB: Taking    Social History     Tobacco Use   Smoking Status Former    Packs/day: 1.00    Years: 7.00    Pack years: 7.00    Types: Cigarettes, Cigars    Start date: 1972    Quit date:     Years since quittin.1    Passive exposure: Never   Smokeless Tobacco Never   Tobacco Comments    smoking was off and on.  cumulative 7 years.  never more than three years in one stretch or more lj      Social:   Lives at home with wife.   Life changes/stressors currently: moved from [place] about 2 years ago.   Aftermath of esophageal cancer. Very fatigued/medication adjustments.   Diet: not always following ADA diet   Meals: 3 per day and snacks.        Breakfast - eggs, bagel, toast. Cereal with granola, banana.       Lunch - sandwich- turkey or ham       Dinner - tuna fish, stir garcia vegetables with shrimp, orka, lynn peppers, steak        Snacks - chips, crackers         Drinks - tea, water  Exercise: not currently, trying to get back into walking - used to walk 3 miles per day.   Activities: retired.     Glucose Monitoring:   Checking sugars daily in the morning -  Standards of care:   Eyes: .: 2023  Foot exam: : 2023   Diabetes education: None.    Vital Signs  /70 (BP Location: Right arm,  Patient Position: Sitting, BP Method: Medium (Manual))   Pulse (!) 51   Temp 97.5 °F (36.4 °C) (Temporal)   Resp 17   Ht 6' (1.829 m)   Wt 90.4 kg (199 lb 4.7 oz)   SpO2 98%   BMI 27.03 kg/m²     Pertinent Labs:   Hgba1c   Lab Results   Component Value Date    HGBA1C 6.2 (H) 05/29/2023     Lipid panel   Lab Results   Component Value Date    CHOL 107 (L) 11/07/2022    CHOL 98 (L) 10/04/2022    CHOL 94 (L) 03/22/2022     Lab Results   Component Value Date    HDL 34 (L) 11/07/2022    HDL 38 (L) 10/04/2022    HDL 42 03/22/2022     Lab Results   Component Value Date    LDLCALC 29.4 (L) 11/07/2022    LDLCALC 16.2 (L) 10/04/2022    LDLCALC 30.2 (L) 03/22/2022     Lab Results   Component Value Date    TRIG 218 (H) 11/07/2022    TRIG 219 (H) 10/04/2022    TRIG 109 03/22/2022     Lab Results   Component Value Date    CHOLHDL 31.8 11/07/2022    CHOLHDL 38.8 10/04/2022    CHOLHDL 44.7 03/22/2022      CMP  Glucose   Date Value Ref Range Status   06/26/2023 98 70 - 110 mg/dL Final     BUN   Date Value Ref Range Status   06/26/2023 8 8 - 23 mg/dL Final     Creatinine   Date Value Ref Range Status   06/26/2023 0.8 0.5 - 1.4 mg/dL Final     eGFR if    Date Value Ref Range Status   05/02/2022 54.5 (A) >60 mL/min/1.73 m^2 Final     eGFR if non    Date Value Ref Range Status   05/02/2022 47.2 (A) >60 mL/min/1.73 m^2 Final     Comment:     Calculation used to obtain the estimated glomerular filtration  rate (eGFR) is the CKD-EPI equation.         Microalbumin creatinine ratio:   Lab Results   Component Value Date    MICALBCREAT 37.0 (H) 05/02/2022       Review Of Systems:   Gen: Appetite good, + weight loss (unintentional after cancer treatments and surgery), + fatigue, Denies polydipsia.  Skin: Skin is intact and heals well, denies any rashes or hair changes.   Eyes: Denies any acute visual disturbances, nor blurred vision.   Resp: Denies SOB or Dyspnea on exertion, denies cough.   Trouble with  swallowing - since surgery and diagnosis.   Cardiac: Denies chest pain, palpitations, or swelling.   GI: Denies abdominal pain, nausea or vomiting, diarrhea, or constipation. Can only tolerate small portions.   /GYN: Denies nocturia, nor burning, frequency or pain on urination.  MS/Neuro: Denies numbness/ tingling in BLE; Gait steady, speech clear - denies headaches.   Psych: Denies drug/ETOH abuse, no hx of depression.  Other systems: negative.    Physical Exam:   GENERAL: Well developed, well nourished in appearance.   PSYCH: AAOx3, appropriate mood and affect, pleasant expression, conversant, appears relaxed, well groomed.   EYES: PERRL, Conjunctiva and corneas clear  NECK: Soft and Supple, trachea midline,   CHEST: Even, regular, and unlabored respirations  ABDOMEN: Soft, non-tender  VASCULAR: pedal pulses palpable bilaterally, no edema.  NEURO:  cranial nerves II - XII intact   MUSCULOSKELETAL: Good ROM, steady gait.   SKIN: Skin warm, dry, and intact   FEET: Pedal pulses palpable and intact.     Assessment and Plan of Care:     Lukasz was seen today for follow-up.    Diagnoses and all orders for this visit:    Type 2 diabetes mellitus with stage 3a chronic kidney disease, without long-term current use of insulin  -     Hemoglobin A1C; Future  -     Comprehensive Metabolic Panel; Future    HFrEF (heart failure with reduced ejection fraction)  -     Ambulatory referral/consult to Cardiology; Future    Hypertension associated with diabetes    Hyperlipidemia associated with type 2 diabetes mellitus    Chronic anticoagulation         1. T2DM with hyperglycemia- Hgba1c goal is 7.5% or less without hypoglycemia - at 7.1%---> 6.8%--> 6.9% --. 7% --> 6.8% current. meets goal. Now at 6.2% (not on meds even).   Not on meds.   Bg's are low 100's. For his age, he is stable.   The weight loss reversed his diabetes and he is maintaining his bg's with lifestyle/diet modifcations.   Can restart metformin if needed.   He has  "heart failure history so I assume someone will "try" to start jardiance.   Advised caution in this due to frailty, weight loss, low bp and heart rate as it is -   Would not start/initiate unless absolutely 100% medically necessary....   discussed DM, progression of disease, long term complications, CV risk factors and tx options.   Advise compliance with ADA diet and encourage exercise- gave him a dietary plan/handout/food list.   Eyes - reported recent exam - outside provider - will ask for/obtain those records.     2. HTN- controlled, continue meds as previously prescribed and monitor.   Lisinopril/hctz - no urine mac.     3. Hyperlipidemia - LDL goal < 100. At goal.   On crestor 20mg every HS.     4. Weight - BMI Body mass index is 27.03 kg/m².   Encourage Ada diet and exercise.   Would like to start glp1 or sglt2i- but theres' not need at present.   He has 60+ lbs of weight loss which in turn helped his diabetes managment. .     5. Gout - on allopurinol 100mg daily - no recent flare ups.   Dr. Wilson following.      6. CKD - stage 2 - 3 - will monitor trends -   Also reports history of frequent kidney stones. Nothing recent, should be resolved since overactive parathyroid gland removed.   urine MAC is stable Wnl.     7. Hyperparathyroidism - s/p removal of 1 gland - had been found incidentally when he kept having recurring kidney stones.   Following with primary.     8. KUMAR - managed - had sleep MD appt. Yesterday, wearing cpap at night no issues.      9. History of kidney stones - has been thought secondary to the hyperparathyroidism.   Is taking potassium -citrate to prevent. I suggested to call his urologist if he would like to stop, as his over-active parathyroid has been removed.  He continues on same.   Had infection and was in hospital for this, now resolved.      10. Low T - taking Testosterone injections. Feels more energy. Defer - managed per dr. Wilson.   ? Defer -     11. Afib and HF - s/p cardioversion - " on bb. Xarelto.   He is following with cardiology.   Recommended talk to Cardiology/Dr. Nick about reducing meds or titration since bp and heart rate are running lower - especially after weight loss 60 lbs.   Likely needs adjustment.     Diabetes stable at present. Not putting/starting meds unnecessarily.   Follow up in 6 months with OV and labs prior.

## 2023-07-19 NOTE — PATIENT INSTRUCTIONS
For now - your diabetes is controlled off of your medications -   Continue to keep an eye on your fasting glucose before you eat -   We can always restart metformin or amaryl as needed daily if your blood sugars start to go up.   Normal fasting blood sugars can range 90 - 130's.       Please contact Pharmacy Patient Assistance Program to determine eligibility for possible medication financial assistance [phone] or [phone]. I will email them as well.  Once you make contact, please ensure you follow through with appropriate and necessary documentation in order to get your medication approved.  Once approved, the application has to be renewed annually.  Medication approval and shipments can take a couple weeks to a few months depending on process and availability of medication.

## 2023-07-20 ENCOUNTER — PATIENT MESSAGE (OUTPATIENT)
Dept: ADMINISTRATIVE | Facility: OTHER | Age: 68
End: 2023-07-20
Payer: MEDICARE

## 2023-07-20 ENCOUNTER — OFFICE VISIT (OUTPATIENT)
Dept: INTERNAL MEDICINE | Facility: CLINIC | Age: 68
End: 2023-07-20
Payer: MEDICARE

## 2023-07-20 VITALS
SYSTOLIC BLOOD PRESSURE: 118 MMHG | HEART RATE: 47 BPM | WEIGHT: 198.44 LBS | HEIGHT: 72 IN | OXYGEN SATURATION: 98 % | BODY MASS INDEX: 26.88 KG/M2 | RESPIRATION RATE: 16 BRPM | DIASTOLIC BLOOD PRESSURE: 68 MMHG | TEMPERATURE: 98 F

## 2023-07-20 DIAGNOSIS — R26.89 IMBALANCE: ICD-10-CM

## 2023-07-20 DIAGNOSIS — N18.31 TYPE 2 DIABETES MELLITUS WITH STAGE 3A CHRONIC KIDNEY DISEASE, WITHOUT LONG-TERM CURRENT USE OF INSULIN: Primary | ICD-10-CM

## 2023-07-20 DIAGNOSIS — M10.9 GOUT, UNSPECIFIED CAUSE, UNSPECIFIED CHRONICITY, UNSPECIFIED SITE: ICD-10-CM

## 2023-07-20 DIAGNOSIS — I10 ESSENTIAL HYPERTENSION: Chronic | ICD-10-CM

## 2023-07-20 DIAGNOSIS — I50.20 HFREF (HEART FAILURE WITH REDUCED EJECTION FRACTION): Chronic | ICD-10-CM

## 2023-07-20 DIAGNOSIS — I48.0 PAROXYSMAL ATRIAL FIBRILLATION: ICD-10-CM

## 2023-07-20 DIAGNOSIS — E78.5 HYPERLIPIDEMIA ASSOCIATED WITH TYPE 2 DIABETES MELLITUS: ICD-10-CM

## 2023-07-20 DIAGNOSIS — E11.69 HYPERLIPIDEMIA ASSOCIATED WITH TYPE 2 DIABETES MELLITUS: ICD-10-CM

## 2023-07-20 DIAGNOSIS — E11.22 TYPE 2 DIABETES MELLITUS WITH STAGE 3A CHRONIC KIDNEY DISEASE, WITHOUT LONG-TERM CURRENT USE OF INSULIN: Primary | ICD-10-CM

## 2023-07-20 PROBLEM — E11.59 HYPERTENSION ASSOCIATED WITH DIABETES: Status: RESOLVED | Noted: 2020-09-14 | Resolved: 2023-07-20

## 2023-07-20 PROBLEM — I15.2 HYPERTENSION ASSOCIATED WITH DIABETES: Status: RESOLVED | Noted: 2020-09-14 | Resolved: 2023-07-20

## 2023-07-20 PROCEDURE — 99214 OFFICE O/P EST MOD 30 MIN: CPT | Mod: S$PBB,,, | Performed by: INTERNAL MEDICINE

## 2023-07-20 PROCEDURE — 99214 PR OFFICE/OUTPT VISIT, EST, LEVL IV, 30-39 MIN: ICD-10-PCS | Mod: S$PBB,,, | Performed by: INTERNAL MEDICINE

## 2023-07-20 PROCEDURE — 99999 PR PBB SHADOW E&M-EST. PATIENT-LVL IV: ICD-10-PCS | Mod: PBBFAC,,, | Performed by: INTERNAL MEDICINE

## 2023-07-20 PROCEDURE — 99999 PR PBB SHADOW E&M-EST. PATIENT-LVL IV: CPT | Mod: PBBFAC,,, | Performed by: INTERNAL MEDICINE

## 2023-07-20 PROCEDURE — 99214 OFFICE O/P EST MOD 30 MIN: CPT | Mod: PBBFAC,PO | Performed by: INTERNAL MEDICINE

## 2023-07-20 RX ORDER — METOPROLOL SUCCINATE 25 MG/1
12.5 TABLET, EXTENDED RELEASE ORAL DAILY
Qty: 15 TABLET | Refills: 11 | Status: SHIPPED | OUTPATIENT
Start: 2023-07-20 | End: 2024-07-19

## 2023-07-20 NOTE — PROGRESS NOTES
Subjective:       Patient ID: Lukasz Person is a 68 y.o. male.    Chief Complaint: Follow-up and Medication Refill (Discussion)    HPI    68-year-old male here for follow-up.  His balance is off.  He has lost a lot of weight quickly.  His muscle mass has been reduced.  He is talking to a dietician about the weight loss.  He has been talked to about integrative cancer therapies.      Diabetes-diet and exercise controlled.  His BG are good.  They run below 100.  Lab Results   Component Value Date    HGBA1C 6.2 (H) 05/29/2023    HGBA1C 6.6 (H) 03/14/2023    HGBA1C 6.9 (H) 11/07/2022     Lab Results   Component Value Date    LDLCALC 29.4 (L) 11/07/2022    CREATININE 0.8 06/26/2023     HTN -  Patient's co morbidities include: Diabetes. Patient is currently on HCTZ 25 mg, Toprol-XL 25 mg. He does check his BP at home, and it runs 110s/60s. Side effects of medications note: none. Denies headaches, blurred vision, chest pain, shortness of breath, nausea.  He had a 165 systolic and 147 this am.  His HR has been 40s-50s.    HLD - Patient is currently on crestor 20 mg.  His last lipid panel was   Cholesterol   Date Value Ref Range Status   11/07/2022 107 (L) 120 - 199 mg/dL Final     Comment:     The National Cholesterol Education Program (NCEP) has set the  following guidelines (reference ranges) for Cholesterol:  Optimal.....................<200 mg/dL  Borderline High.............200-239 mg/dL  High........................> or = 240 mg/dL       Triglycerides   Date Value Ref Range Status   11/07/2022 218 (H) 30 - 150 mg/dL Final     Comment:     The National Cholesterol Education Program (NCEP) has set the  following guidelines (reference values) for triglycerides:  Normal......................<150 mg/dL  Borderline High.............150-199 mg/dL  High........................200-499 mg/dL       HDL   Date Value Ref Range Status   11/07/2022 34 (L) 40 - 75 mg/dL Final     Comment:     The National Cholesterol Education Program  (NCEP) has set the  following guidelines (reference values) for HDL Cholesterol:  Low...............<40 mg/dL  Optimal...........>60 mg/dL       LDL Cholesterol   Date Value Ref Range Status   11/07/2022 29.4 (L) 63.0 - 159.0 mg/dL Final     Comment:     The National Cholesterol Education Program (NCEP) has set the  following guidelines (reference values) for LDL Cholesterol:  Optimal.......................<130 mg/dL  Borderline High...............130-159 mg/dL  High..........................160-189 mg/dL  Very High.....................>190 mg/dL     .  Side effects of the medication: none.    Patient has heart failure and is on no current medication.    Review of Systems      Objective:      Physical Exam  Vitals reviewed.   Constitutional:       Appearance: He is well-developed.   HENT:      Head: Normocephalic and atraumatic.      Mouth/Throat:      Pharynx: No oropharyngeal exudate.   Eyes:      General: No scleral icterus.        Right eye: No discharge.         Left eye: No discharge.      Pupils: Pupils are equal, round, and reactive to light.   Neck:      Thyroid: No thyromegaly.      Trachea: No tracheal deviation.   Cardiovascular:      Rate and Rhythm: Normal rate and regular rhythm.      Heart sounds: Normal heart sounds. No murmur heard.    No friction rub. No gallop.   Pulmonary:      Effort: Pulmonary effort is normal. No respiratory distress.      Breath sounds: Normal breath sounds. No wheezing or rales.   Chest:      Chest wall: No tenderness.   Abdominal:      General: Bowel sounds are normal. There is no distension.      Palpations: Abdomen is soft. There is no mass.      Tenderness: There is no abdominal tenderness. There is no guarding or rebound.   Musculoskeletal:         General: Swelling (slight swelling bilaterally) present. No tenderness. Normal range of motion.      Cervical back: Normal range of motion and neck supple.   Skin:     General: Skin is warm and dry.      Coloration: Skin is  not pale.      Findings: No erythema or rash.   Neurological:      Mental Status: He is alert and oriented to person, place, and time.   Psychiatric:         Behavior: Behavior normal.       Assessment:       1. Type 2 diabetes mellitus with stage 3a chronic kidney disease, without long-term current use of insulin  - TSH; Future    2. Essential hypertension    3. Hyperlipidemia associated with type 2 diabetes mellitus    4. HFrEF (heart failure with reduced ejection fraction)    5. Paroxysmal atrial fibrillation  - metoprolol succinate (TOPROL-XL) 25 MG 24 hr tablet; Take 0.5 tablets (12.5 mg total) by mouth once daily.  Dispense: 15 tablet; Refill: 11    6. Gout, unspecified cause, unspecified chronicity, unspecified site  - URIC ACID; Future    7. Imbalance  - Ambulatory referral/consult to Physical/Occupational Therapy; Future      Plan:       1. Diet and exercise controlled.  2.  Continue HCTZ 25 mg, decrease Toprol-XL to 12.5 mg.  3.  Continue Crestor 20 mg p.o.  4.  Monitor  5.  Continue Toprol, Xarelto.    6. Check uric acid.  7.  Refer to physical therapy.

## 2023-07-21 ENCOUNTER — PATIENT MESSAGE (OUTPATIENT)
Dept: ADMINISTRATIVE | Facility: OTHER | Age: 68
End: 2023-07-21
Payer: MEDICARE

## 2023-07-23 ENCOUNTER — PATIENT MESSAGE (OUTPATIENT)
Dept: ADMINISTRATIVE | Facility: OTHER | Age: 68
End: 2023-07-23
Payer: MEDICARE

## 2023-07-23 ENCOUNTER — PATIENT MESSAGE (OUTPATIENT)
Dept: HEMATOLOGY/ONCOLOGY | Facility: CLINIC | Age: 68
End: 2023-07-23
Payer: MEDICARE

## 2023-07-24 ENCOUNTER — TELEPHONE (OUTPATIENT)
Dept: HEMATOLOGY/ONCOLOGY | Facility: CLINIC | Age: 68
End: 2023-07-24
Payer: MEDICARE

## 2023-07-24 ENCOUNTER — RESEARCH ENCOUNTER (OUTPATIENT)
Dept: RESEARCH | Facility: HOSPITAL | Age: 68
End: 2023-07-24
Payer: MEDICARE

## 2023-07-24 ENCOUNTER — OFFICE VISIT (OUTPATIENT)
Dept: HEMATOLOGY/ONCOLOGY | Facility: CLINIC | Age: 68
End: 2023-07-24
Payer: MEDICARE

## 2023-07-24 ENCOUNTER — PATIENT MESSAGE (OUTPATIENT)
Dept: NEPHROLOGY | Facility: CLINIC | Age: 68
End: 2023-07-24
Payer: MEDICARE

## 2023-07-24 ENCOUNTER — LAB VISIT (OUTPATIENT)
Dept: LAB | Facility: HOSPITAL | Age: 68
End: 2023-07-24
Payer: MEDICARE

## 2023-07-24 VITALS
WEIGHT: 191.13 LBS | DIASTOLIC BLOOD PRESSURE: 60 MMHG | BODY MASS INDEX: 25.89 KG/M2 | OXYGEN SATURATION: 99 % | TEMPERATURE: 98 F | HEIGHT: 72 IN | HEART RATE: 54 BPM | SYSTOLIC BLOOD PRESSURE: 130 MMHG | RESPIRATION RATE: 18 BRPM

## 2023-07-24 DIAGNOSIS — R13.10 DYSPHAGIA, UNSPECIFIED TYPE: ICD-10-CM

## 2023-07-24 DIAGNOSIS — E11.22 TYPE 2 DIABETES MELLITUS WITH STAGE 3A CHRONIC KIDNEY DISEASE, WITHOUT LONG-TERM CURRENT USE OF INSULIN: ICD-10-CM

## 2023-07-24 DIAGNOSIS — C15.9 ESOPHAGEAL ADENOCARCINOMA: ICD-10-CM

## 2023-07-24 DIAGNOSIS — E11.22 CKD STAGE 3 DUE TO TYPE 2 DIABETES MELLITUS: ICD-10-CM

## 2023-07-24 DIAGNOSIS — E11.69 HYPERLIPIDEMIA ASSOCIATED WITH TYPE 2 DIABETES MELLITUS: ICD-10-CM

## 2023-07-24 DIAGNOSIS — C15.9 ESOPHAGEAL ADENOCARCINOMA: Primary | ICD-10-CM

## 2023-07-24 DIAGNOSIS — I50.20 HFREF (HEART FAILURE WITH REDUCED EJECTION FRACTION): ICD-10-CM

## 2023-07-24 DIAGNOSIS — E44.0 MODERATE MALNUTRITION: ICD-10-CM

## 2023-07-24 DIAGNOSIS — E11.22 TYPE 2 DIABETES MELLITUS WITH STAGE 3 CHRONIC KIDNEY DISEASE, WITHOUT LONG-TERM CURRENT USE OF INSULIN, UNSPECIFIED WHETHER STAGE 3A OR 3B CKD: ICD-10-CM

## 2023-07-24 DIAGNOSIS — I15.2 HYPERTENSION ASSOCIATED WITH DIABETES: ICD-10-CM

## 2023-07-24 DIAGNOSIS — I48.0 PAROXYSMAL ATRIAL FIBRILLATION: ICD-10-CM

## 2023-07-24 DIAGNOSIS — N18.30 TYPE 2 DIABETES MELLITUS WITH STAGE 3 CHRONIC KIDNEY DISEASE, WITHOUT LONG-TERM CURRENT USE OF INSULIN, UNSPECIFIED WHETHER STAGE 3A OR 3B CKD: ICD-10-CM

## 2023-07-24 DIAGNOSIS — Z00.6 CLINICAL TRIAL PARTICIPANT: ICD-10-CM

## 2023-07-24 DIAGNOSIS — E78.5 HYPERLIPIDEMIA ASSOCIATED WITH TYPE 2 DIABETES MELLITUS: ICD-10-CM

## 2023-07-24 DIAGNOSIS — E11.59 HYPERTENSION ASSOCIATED WITH DIABETES: ICD-10-CM

## 2023-07-24 DIAGNOSIS — M10.9 GOUT, UNSPECIFIED CAUSE, UNSPECIFIED CHRONICITY, UNSPECIFIED SITE: ICD-10-CM

## 2023-07-24 DIAGNOSIS — N18.30 CKD STAGE 3 DUE TO TYPE 2 DIABETES MELLITUS: ICD-10-CM

## 2023-07-24 DIAGNOSIS — N18.31 TYPE 2 DIABETES MELLITUS WITH STAGE 3A CHRONIC KIDNEY DISEASE, WITHOUT LONG-TERM CURRENT USE OF INSULIN: ICD-10-CM

## 2023-07-24 DIAGNOSIS — R21 RASH: ICD-10-CM

## 2023-07-24 DIAGNOSIS — J38.00 VOCAL CORD PARALYSIS: ICD-10-CM

## 2023-07-24 DIAGNOSIS — K04.7 DENTAL INFECTION: ICD-10-CM

## 2023-07-24 DIAGNOSIS — I25.10 CORONARY ARTERY DISEASE INVOLVING NATIVE CORONARY ARTERY OF NATIVE HEART WITHOUT ANGINA PECTORIS: ICD-10-CM

## 2023-07-24 LAB
ALBUMIN SERPL BCP-MCNC: 4.2 G/DL (ref 3.5–5.2)
ALP SERPL-CCNC: 107 U/L (ref 55–135)
ALT SERPL W/O P-5'-P-CCNC: 28 U/L (ref 10–44)
ANION GAP SERPL CALC-SCNC: 8 MMOL/L (ref 8–16)
AST SERPL-CCNC: 26 U/L (ref 10–40)
BASOPHILS # BLD AUTO: 0.04 K/UL (ref 0–0.2)
BASOPHILS NFR BLD: 0.8 % (ref 0–1.9)
BILIRUB DIRECT SERPL-MCNC: 0.5 MG/DL (ref 0.1–0.3)
BILIRUB SERPL-MCNC: 1.1 MG/DL (ref 0.1–1)
BUN SERPL-MCNC: 11 MG/DL (ref 8–23)
CALCIUM SERPL-MCNC: 10 MG/DL (ref 8.7–10.5)
CHLORIDE SERPL-SCNC: 101 MMOL/L (ref 95–110)
CO2 SERPL-SCNC: 32 MMOL/L (ref 23–29)
CREAT SERPL-MCNC: 0.9 MG/DL (ref 0.5–1.4)
DIFFERENTIAL METHOD: ABNORMAL
EOSINOPHIL # BLD AUTO: 0.2 K/UL (ref 0–0.5)
EOSINOPHIL NFR BLD: 3.4 % (ref 0–8)
ERYTHROCYTE [DISTWIDTH] IN BLOOD BY AUTOMATED COUNT: 15.4 % (ref 11.5–14.5)
EST. GFR  (NO RACE VARIABLE): >60 ML/MIN/1.73 M^2
GLUCOSE SERPL-MCNC: 108 MG/DL (ref 70–110)
HCT VFR BLD AUTO: 44.2 % (ref 40–54)
HGB BLD-MCNC: 13.5 G/DL (ref 14–18)
IMM GRANULOCYTES # BLD AUTO: 0.02 K/UL (ref 0–0.04)
IMM GRANULOCYTES NFR BLD AUTO: 0.4 % (ref 0–0.5)
LYMPHOCYTES # BLD AUTO: 0.5 K/UL (ref 1–4.8)
LYMPHOCYTES NFR BLD: 9.9 % (ref 18–48)
MAGNESIUM SERPL-MCNC: 1.8 MG/DL (ref 1.6–2.6)
MCH RBC QN AUTO: 28.2 PG (ref 27–31)
MCHC RBC AUTO-ENTMCNC: 30.5 G/DL (ref 32–36)
MCV RBC AUTO: 92 FL (ref 82–98)
MONOCYTES # BLD AUTO: 0.4 K/UL (ref 0.3–1)
MONOCYTES NFR BLD: 7.7 % (ref 4–15)
NEUTROPHILS # BLD AUTO: 3.8 K/UL (ref 1.8–7.7)
NEUTROPHILS NFR BLD: 77.8 % (ref 38–73)
NRBC BLD-RTO: 0 /100 WBC
PHOSPHATE SERPL-MCNC: 3.2 MG/DL (ref 2.7–4.5)
PLATELET # BLD AUTO: 173 K/UL (ref 150–450)
PMV BLD AUTO: 11 FL (ref 9.2–12.9)
POTASSIUM SERPL-SCNC: 3.9 MMOL/L (ref 3.5–5.1)
PROT SERPL-MCNC: 7.5 G/DL (ref 6–8.4)
RBC # BLD AUTO: 4.79 M/UL (ref 4.6–6.2)
SODIUM SERPL-SCNC: 141 MMOL/L (ref 136–145)
TSH SERPL DL<=0.005 MIU/L-ACNC: 0.49 UIU/ML (ref 0.4–4)
URATE SERPL-MCNC: 4.4 MG/DL (ref 3.4–7)
WBC # BLD AUTO: 4.93 K/UL (ref 3.9–12.7)

## 2023-07-24 PROCEDURE — 84550 ASSAY OF BLOOD/URIC ACID: CPT | Performed by: INTERNAL MEDICINE

## 2023-07-24 PROCEDURE — 84443 ASSAY THYROID STIM HORMONE: CPT | Performed by: INTERNAL MEDICINE

## 2023-07-24 PROCEDURE — 85025 COMPLETE CBC W/AUTO DIFF WBC: CPT | Mod: Q1 | Performed by: INTERNAL MEDICINE

## 2023-07-24 PROCEDURE — 99999 PR PBB SHADOW E&M-EST. PATIENT-LVL IV: ICD-10-PCS | Mod: PBBFAC,,, | Performed by: REGISTERED NURSE

## 2023-07-24 PROCEDURE — 82248 BILIRUBIN DIRECT: CPT | Performed by: INTERNAL MEDICINE

## 2023-07-24 PROCEDURE — 99215 OFFICE O/P EST HI 40 MIN: CPT | Mod: Q1,S$PBB,, | Performed by: REGISTERED NURSE

## 2023-07-24 PROCEDURE — 99214 OFFICE O/P EST MOD 30 MIN: CPT | Mod: PBBFAC | Performed by: REGISTERED NURSE

## 2023-07-24 PROCEDURE — 80053 COMPREHEN METABOLIC PANEL: CPT | Performed by: INTERNAL MEDICINE

## 2023-07-24 PROCEDURE — 84100 ASSAY OF PHOSPHORUS: CPT | Mod: Q1 | Performed by: INTERNAL MEDICINE

## 2023-07-24 PROCEDURE — 83735 ASSAY OF MAGNESIUM: CPT | Performed by: INTERNAL MEDICINE

## 2023-07-24 PROCEDURE — 36415 COLL VENOUS BLD VENIPUNCTURE: CPT | Mod: Q1 | Performed by: INTERNAL MEDICINE

## 2023-07-24 PROCEDURE — 99215 PR OFFICE/OUTPT VISIT, EST, LEVL V, 40-54 MIN: ICD-10-PCS | Mod: Q1,S$PBB,, | Performed by: REGISTERED NURSE

## 2023-07-24 PROCEDURE — 99999 PR PBB SHADOW E&M-EST. PATIENT-LVL IV: CPT | Mod: PBBFAC,,, | Performed by: REGISTERED NURSE

## 2023-07-24 RX ORDER — SODIUM CHLORIDE 0.9 % (FLUSH) 0.9 %
10 SYRINGE (ML) INJECTION
Status: CANCELLED | OUTPATIENT
Start: 2023-07-24

## 2023-07-24 RX ORDER — TRIAMCINOLONE ACETONIDE 1 MG/G
CREAM TOPICAL 2 TIMES DAILY
Qty: 45 G | Refills: 1 | Status: SHIPPED | OUTPATIENT
Start: 2023-07-24 | End: 2023-10-31 | Stop reason: SDUPTHER

## 2023-07-24 RX ORDER — EPINEPHRINE 0.3 MG/.3ML
0.3 INJECTION SUBCUTANEOUS ONCE AS NEEDED
Status: CANCELLED | OUTPATIENT
Start: 2023-07-24

## 2023-07-24 RX ORDER — HEPARIN 100 UNIT/ML
500 SYRINGE INTRAVENOUS
Status: CANCELLED | OUTPATIENT
Start: 2023-07-24

## 2023-07-24 RX ORDER — DIPHENHYDRAMINE HYDROCHLORIDE 50 MG/ML
50 INJECTION INTRAMUSCULAR; INTRAVENOUS ONCE AS NEEDED
Status: CANCELLED | OUTPATIENT
Start: 2023-07-24

## 2023-07-24 NOTE — PROGRESS NOTES
": URIEL Manriquez MD  Treating Investigators: NIRAV Medrano MD  Surgical Oncologist: SARAH Brown M.D. 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 Patient queried & verbalized his consent for continued participation in above-mentioned trial. Patient queried and continues to report dysphagia (improving), dysgeusia, hoarseness (improving), fatigue, and intermittent nausea. Patient reports new onset pruritus in his chest area & occasionally on his upper back. CRC & Bernadette assessed patient for rash, and mild rash noted on his chest & upper back with very faint extension across the rest of his back. Patient does not appear to have a rash on his extremities. Bernadette prescribed triamcinolone for his chest pruritus on his chest BID with the instruction to contact me or Dr. Medrano's office if he notes pruritus worsening or a rash extending to his arms and legs.   Patient also reports that he notices he has "crackles" in his breathing in the morning upon waking, lasting no longer than 1 hour. His PCP states it sounds like he is aspirating because he states he tends to slip from sitting up to sleeping on his back. Patient currently stops eating at 2000, goes to lay down around 2130 - 2200 & stays upright in bed until ~2300, giving 2-3 hours of digestion prior to going to sleep. In order to maintain his eating habits, this duration between his last snack & going to sleep is adequate, but should mention it to Dr. Brown or Syeda. Patient verbalized his " understanding of all instructions.   Patient confirms he had NOT stopped his Metoprolol, but denies using any other previous medications. He also reports that he is no longer covered to receive Xarelto, and is being switched to Coumadin. Patient was instructed to follow the instructions his prescribing provider, and we will follow him closely.    Patient completed safety labs, VS, PE & ECOG (ECOG = 0, per Bernadette) today per protocol. Bernadette assessed all patient's labs, VS & PE findings as either WNL or NCS, deeming patient eligible to continue treatment with Nivolumab monotherapy. Due to scheduling restrictions, patient's infusion is scheduled for tomorrow at 1300.      Weight:  Step 1 Week 1 was 117.2 kg  Step 2 C1D1 was 99.2 kg   ( +/- 10% = 89.3 - 109.2 kg).   Today's weight is 86.7 kg   >20% weight loss since Step 1 Week 1 --> >10% change from Step 2 Cycle 1.      Per protocol, Nivolumab is administered at a 480 mg flat dose & cannot be modified. MACARIO & Bernadette performed time-out in exam room.     Patient was encouraged to contact MACARIO or Dr. Medrano's team with any questions or concerns & was reminded of clinic's 24/7 emergency contact number. Patient verbalized understanding & denies any additional questions at this time.     Step 2 -- C5D1:  [id number] @ 0810 - Safety labs [facility] 3rd floor  [id number] @ 0900 - [facility] 2nd floor  [id number] @ 1300 - infusion (Nivolumab only)        Solicited AEs -- Assessed 29May2023:  ** Anemia - Grade 1 -- (NCS per MD) -- continued from prior visit  Platelet count decreased - Grade 0   White blood cell count decreased - Grade 0   Neutrophil count decreased - Grade 0  ** Lymphocyte count decreased - Grade 2   (NCS per MD)    6/26/2023 = Grade 3 -- NCS per MD   7/24/2023 = Grade 2 -- NCS per MD  Peripheral motor neuropathy - Grade 0   Peripheral sensory neuropathy - Grade 0   Creatinine increased - Grade 0  Hypothyroidism - Grade 0   (TSH WNL on 24Jul2023)  Diarrhea (patient baseline BM =  2 stools a day) - Grade 0 -- reports loose stools vs. diarrhea   **Fatigue - Grade 2   **Nausea - Grade 1  **Cough - Grade 1  -- Medical History  Pneumonitis - Grade 0  **Hyperglycemia - Grade 0 -- Medical History 2/2 DMT2    Non-fasting glucose WNL 6/26/2023 & 7/24/2023  Adrenal Insufficiency - Grade 0  **Rash-maculo-papular - Grade 1 - [id number] - ongoing ( used Sarna [camphor 0.5% - menthol 0.5&] PRN -- Prescribed triamcinolone 24Jul2023 ) -- (NCS per MD)  **Pruritis - Grade 1 - 17Jul2023 -- (NCS per MD)  Erythema multiforme - Grade 0  Vomiting - Grade 0    Lipase increased -- Not required per protocol and therefore not assessed this visit.      Ongoing Non-Solicited AEs:  Anorexia - Grade 1 - [id number] - ongoing (no treatment)  Dysgeusia - Grade 1 - 39Iwk1591 - ongoing ( no treatment )  Generalized weakness - Grade 1 - [id number] - ongoing ( no treatment )  Nausea - Grade 1 - 25Mqf1831 - ongoing ( no treatment )  Hoarseness - Grade 2 - [id number] - ongoing ( no treatment )  Dysphagia - Grade 1 - 45Qvw5257 - ongoing      New or Changed AEs Since Last Visit:  Weight loss - Grade 2 - [id number] - ongoing           Complete Baseline Medical History, Adverse Events, and Concomitant Medications are located in patient's shadow chart

## 2023-07-24 NOTE — NURSING
RN Navigator spoke with patient regarding Integrative Therapies. He is currently seeing Dr. Medrano and will return my call following his appt, return contact information communicated, MARY Frausto.

## 2023-07-24 NOTE — PROGRESS NOTES
MEDICAL ONCOLOGY - ESTABLISHED PATIENT VISIT    Reason for visit: esophageal cancer    Best Contact Phone Number(s): There are no phone numbers on file.     Cancer/Stage/TNM:    Cancer Staging   Esophageal adenocarcinoma  Staging form: Esophagus - Adenocarcinoma, AJCC 8th Edition  - Pathologic stage from 3/28/2023: Stage II (ypT3, pN0, cM0, G2) - Signed by Santana Brown Jr., MD on 3/28/2023       Oncology History   Esophageal adenocarcinoma   12/8/2022 Initial Diagnosis    Esophageal adenocarcinoma     12/21/2022 - 12/21/2022 Chemotherapy    Treatment Summary   Plan Name: OP ESOPHAGEAL PACLITAXEL CARBOPLATIN WEEKLY  Treatment Goal: Curative  Status: Inactive  Start Date:   End Date:   Provider: Miki Medrano MD  Chemotherapy: CARBOplatin (PARAPLATIN) in sodium chloride 0.9% 250 mL chemo infusion, , Intravenous, Clinic/HOD 1 time, 0 of 1 cycle  PACLitaxeL (TAXOL) 50 mg/m2 = 120 mg in sodium chloride 0.9% 250 mL chemo infusion, 50 mg/m2, Intravenous, Clinic/HOD 1 time, 0 of 1 cycle     12/29/2022 - 1/20/2023 Chemotherapy    Treatment Summary   Plan Name: [facility] [id number] ARM B CARBOPLATIN PACLITAXEL NIVOLUMAB  Treatment Goal: Curative  Status: Inactive  Start Date: 12/29/2022  End Date: 1/20/2023  Provider: Miki Medrano MD  Chemotherapy: CARBOplatin (PARAPLATIN) 215 mg in sodium chloride 0.9% 306.5 mL chemo infusion, 215 mg, Intravenous, Clinic/HOD 1 time, 4 of 5 cycles  Administration: 215 mg (12/29/2022), 230 mg (1/5/2023), 265 mg (1/13/2023), 265 mg (1/20/2023)  PACLitaxeL (TAXOL) 50 mg/m2 = 120 mg in sodium chloride 0.9% 250 mL chemo infusion, 50 mg/m2 = 120 mg, Intravenous, Clinic/HOD 1 time, 4 of 5 cycles  Dose modification: 50 mg/m2 (original dose 50 mg/m2, Cycle 4)  Administration: 120 mg (12/29/2022), 120 mg (1/5/2023), 120 mg (1/13/2023), 120 mg (1/20/2023)     12/29/2022 - 2/1/2023 Radiation Therapy    Treating physician: Dr. Javier Moulton    Course: C1 CHEST 2022    Treatment Site Ref. ID  Energy Dose/Fx (Gy) #Fx Dose Correction (Gy) Total Dose (Gy) Start Date End Date Elapsed Days   IM Esophagus [id number] 6X 1.8 23 / 23 0 41.4 12/29/2022 2/1/2023 34          3/17/2023 Surgery    Procedure: Procedure(s) (LRB):  XI ROBOTIC ESOPHAGECTOMY (N/A)  ROBOTIC JEJUNOSTOMY TUBE INSERTION (N/A)      Surgeon(s) and Role:     * Santana Brown Jr., MD - Primary     * Darian Murphy MD - Assisting     Assistance: Due to having no qualified resident during critical portions of the case, Dr. Darian Murphy acted as an assistant.     Pre-Operative Diagnosis: Esophageal adenocarcinoma, distal 1/3     Post-Operative Diagnosis: Same     Pre-Operative Variables:  Stage: T3 N0  Adjacent organ involvement: None  Chemotherapy within 90 Days: Yes  Radiation Therapy within 90 Days: Yes     Comorbidities:  Morbid obesity  Type 2 diabetes mellitus  Obstructive sleep apnea  Gout  Hyperparathyroidism  Hypertension  Severe obesity  Coronary artery disease  Heart failure with reduced ejection fraction    Procedure:  Robotic-assisted Vj three field esophagectomy  Regional mediastinal lymph node dissection  Botox injection of the pylorus  Jejunostomy tube placement     Operative Findings:                No evidence of distant intraperitoneal metastases in the chest or abdomen  Esophageal tumor located in the distal third of the esophagus, mild radiation changes appreciated.  Resection status:  R0 pending final pathology.  No gross disease remaining post dissection.  Frozen sections on proximal and distal margins negative for malignancy  100u Botox injection into the pylorus  Stapled esophagogastrostomy performed at the neck incision after confirmation of negative margins.  Jejunostomy tube placed      3/28/2023 Cancer Staged    Staging form: Esophagus - Adenocarcinoma, AJCC 8th Edition  - Pathologic stage from 3/28/2023: Stage II (ypT3, pN0, cM0, G2)     5/1/2023 - 5/1/2023 Chemotherapy    Treatment Summary   Plan Name: [facility] [id number]  "ARM C ADJUVANT NIVOLUMAB  Treatment Goal: Curative  Status: Inactive  Start Date: 5/1/2023  End Date: 5/1/2023  Provider: Miki Medrano MD  Chemotherapy: [No matching medication found in this treatment plan]     5/29/2023 -  Chemotherapy    Treatment Summary   Plan Name: [facility] [id number] ARM C ADJUVANT NIVOLUMAB STEP 2  Treatment Goal: Curative  Status: Active  Start Date: 5/29/2023  End Date: 4/29/2024 (Planned)  Provider: Miki Medrano MD  Chemotherapy: [No matching medication found in this treatment plan]          Interim History:   Mr. Person returns to clinic today prior to cycle 4 of adjuvant nivolumab. He underwent robotic esophagectomy on 3/14/23 with Dr. Brown and J tube insertion.     Doing well overall. Eating nearly every 2 hours or so and eats "to capacity." However, weight decreased ~8 lbs since last visit. He is following with nutrition closely. Feels he is aspirating despite falling asleep in a sitting position. Generally stops eating at 8 PM and sits up for at least 2-3 hours before falling asleep. However, wakes up with "crackling" voice that resolves with cough. States he still feels "something in his throat" that causes him to cough/swallow frequently to try to clear the sensation. Reports now with pruritis near port site and across chest. Occasionally with pruritis to upper back, but very seldom. Using Sarna OTC which helps with the pruritis. Denies dyspnea, diarrhea.    No other new complaints. Presents to clinic alone. ECOG 0.     Review of Systems   Constitutional:  Positive for weight loss. Negative for chills, diaphoresis, fever and malaise/fatigue.   HENT:  Negative for sore throat.    Eyes:  Negative for blurred vision and double vision.   Respiratory:  Negative for cough and shortness of breath.    Cardiovascular:  Negative for chest pain, palpitations and leg swelling.   Gastrointestinal:  Negative for abdominal pain, blood in stool, constipation, diarrhea, heartburn, nausea and " vomiting.        Dysphagia   Genitourinary:  Negative for dysuria, frequency, hematuria and urgency.   Musculoskeletal:  Negative for back pain, falls, myalgias and neck pain.   Skin:  Positive for itching and rash.   Neurological:  Negative for dizziness, tingling, weakness and headaches.   Psychiatric/Behavioral:  The patient is not nervous/anxious.      Past Medical History:   Past Medical History:   Diagnosis Date    Anticoagulant long-term use     Diabetes mellitus     Onset late 50s/early 60s    Hyperlipidemia     Hypertension     Onset late 50s/early 60s    Kidney stones     Sleep apnea     since 2006      Past Surgical History:   Past Surgical History:   Procedure Laterality Date    CORONARY ANGIOGRAPHY N/A 9/30/2020    Procedure: ANGIOGRAM, CORONARY ARTERY;  Surgeon: John West MD;  Location: [facility] CATH LAB;  Service: Cardiology;  Laterality: N/A;    CYSTOSCOPY N/A 2/17/2022    Procedure: CYSTOSCOPY;  Surgeon: Lukasz Hughes MD;  Location: [facility] OR [address] [place] [address];  Service: Urology;  Laterality: N/A;    CYSTOSCOPY W/ URETERAL STENT PLACEMENT N/A 2/25/2022    Procedure: CYSTOSCOPY, WITH URETERAL STENT INSERTION;  Surgeon: Lukasz Hughes MD;  Location: [facility] OR [address] [place] [address];  Service: Urology;  Laterality: N/A;    ENDOSCOPIC ULTRASOUND OF UPPER GASTROINTESTINAL TRACT N/A 12/7/2022    Procedure: ULTRASOUND, UPPER GI TRACT, ENDOSCOPIC;  Surgeon: Darian Main MD;  Location: [facility] ENDO;  Service: Endoscopy;  Laterality: N/A;  Approval to hold Xarelto rec'd from Dr. Nick (see t/e 12/5/22)-DS    ESOPHAGOGASTRODUODENOSCOPY N/A 11/17/2022    Procedure: EGD (ESOPHAGOGASTRODUODENOSCOPY);  Surgeon: Brody Gonzales MD;  Location: [facility] (2ND FLR);  Service: Endoscopy;  Laterality: N/A;  inst via email-ok to hold Xarelto x 2 days-MS    ESOPHAGOGASTRODUODENOSCOPY N/A 5/31/2023    Procedure: EGD (ESOPHAGOGASTRODUODENOSCOPY) WITH DILATION;  Surgeon: Santana Brown Jr., MD;  [facility];  Service:  General;  Laterality: N/A;    LASER LITHOTRIPSY Left 2/25/2022    Procedure: LITHOTRIPSY, USING LASER;  Surgeon: Lukasz Hughes MD;  [facility];  Service: Urology;  Laterality: Left;    LEFT HEART CATHETERIZATION Left 9/30/2020    Procedure: Left heart cath;  Surgeon: John West MD;  Location: [facility] CATH LAB;  Service: Cardiology;  Laterality: Left;    LITHOTRIPSY      PARATHYROIDECTOMY  1/1/2-107    PYELOSCOPY Left 2/25/2022    Procedure: PYELOSCOPY;  Surgeon: Lukasz Hughes MD;  [facility];  Service: Urology;  Laterality: Left;    ROBOT-ASSISTED SURGICAL REMOVAL OF ESOPHAGUS USING DA CARLOS XI N/A 3/14/2023    Procedure: XI ROBOTIC ESOPHAGECTOMY;  Surgeon: Santana Brown Jr., MD;  Location: [address];  Service: General;  Laterality: N/A;  Abdomen, Chest and Neck    ROBOTIC JEJUNOSTOMY N/A 3/14/2023    Procedure: ROBOTIC JEJUNOSTOMY TUBE INSERTION;  Surgeon: Santana Brown Jr., MD;  [facility];  Service: General;  Laterality: N/A;    TRANSESOPHAGEAL ECHOCARDIOGRAPHY N/A 4/7/2022    Procedure: ECHOCARDIOGRAM, TRANSESOPHAGEAL;  Surgeon: [facility] Diagnostic Provider;  Location: [facility] EP LAB;  Service: Cardiology;  Laterality: N/A;    TREATMENT OF CARDIAC ARRHYTHMIA N/A 4/7/2022    Procedure: Cardioversion or Defibrillation;  Surgeon: Iris Fisher NP;  Location: [facility] EP LAB;  Service: Cardiology;  Laterality: N/A;  afib, dccv, artie, anes, EH, 3prep    URETEROSCOPIC REMOVAL OF URETERIC CALCULUS Left 2/25/2022    Procedure: REMOVAL, CALCULUS, URETER, URETEROSCOPIC;  Surgeon: Lukasz Hughes MD;  Location: [facility] OR [address] [place] [address];  Service: Urology;  Laterality: Left;    URETEROSCOPY Left 2/25/2022    Procedure: URETEROSCOPY;  Surgeon: Lukasz Hughes MD;  Location: [address];  Service: Urology;  Laterality: Left;    VOCAL CORD INJECTION Left 3/17/2023    Procedure: INJECTION, VOCAL CORD, LARYNGOSCOPIC;  Surgeon: Gm Altman MD;  Location:  [facility] OR 2ND FLR;  Service: ENT;  Laterality: Left;      Family History:   Family History   Problem Relation Age of Onset    Arthritis Mother     Heart disease Father 70        CABG    Arthritis Father     Diabetes Father     Kidney disease Father         had one kidney removed in early thirties    Arthritis Sister     Arthritis Sister     Hypertension Sister     Colon cancer Brother 32    Arthritis Brother     Alcohol abuse Paternal Aunt     Cancer Maternal Grandfather         throat cancer    Diabetes Paternal Grandmother     Colon polyps Paternal Grandfather     Colon cancer Paternal Grandfather     Cancer Paternal Grandfather         colon cancer at age 62      Social History:   Social History     Tobacco Use    Smoking status: Former     Packs/day: 1.00     Years: 7.00     Pack years: 7.00     Types: Cigarettes, Cigars     Start date: 1972     Quit date:      Years since quittin.2     Passive exposure: Never    Smokeless tobacco: Never    Tobacco comments:     smoking was off and on.  cumulative 7 years.  never more than three years in one stretch or more lj   Substance Use Topics    Alcohol use: Not Currently      I have reviewed and updated the patient's past medical, surgical, family and social histories.    Allergies:   Review of patient's allergies indicates:  No Known Allergies     Medications:   Current Outpatient Medications   Medication Sig Dispense Refill    allopurinoL (ZYLOPRIM) 100 MG tablet TAKE 1 TABLET BY MOUTH EVERY DAY 30 tablet 10    blood sugar diagnostic (ACCU-CHEK ANTONELLA PLUS TEST STRP) Strp Use to test CBG four times daily E11.65 400 strip 1    blood-glucose meter kit Checks blood sugars 1x/daily. 1 each 12    hydroCHLOROthiazide (HYDRODIURIL) 25 MG tablet Take 1 tablet (25 mg total) by mouth once daily. 30 tablet 11    lancets (ACCU-CHEK SOFTCLIX LANCETS) Misc Use to test CBG 4 times daily E11.65 400 each 1    metoprolol succinate (TOPROL-XL) 25 MG 24 hr tablet Take 0.5  tablets (12.5 mg total) by mouth once daily. 15 tablet 11    nitroGLYCERIN (NITROSTAT) 0.4 MG SL tablet Place 1 tablet (0.4 mg total) under the tongue every 5 (five) minutes as needed for Chest pain. If you need a third tablet, call 911 60 tablet 12    omeprazole (PRILOSEC) 40 MG capsule Take 1 capsule (40 mg total) by mouth once daily. 90 capsule 3    rivaroxaban (XARELTO) 20 mg Tab Take 1 tablet (20 mg total) by mouth daily with dinner or evening meal. 30 tablet 3    rosuvastatin (CRESTOR) 20 MG tablet TAKE 1 TABLET(20 MG) BY MOUTH EVERY DAY (Patient taking differently: Take 20 mg by mouth every evening.) 30 tablet 11    tamsulosin (FLOMAX) 0.4 mg Cap Take 1 capsule (0.4 mg total) by mouth once daily. (Patient taking differently: Take 0.4 mg by mouth nightly.) 30 capsule 11    testosterone (ANDROGEL) 20.25 mg/1.25 gram (1.62 %) GlPm Apply 4 pumps to shoulders daily 2 each 5    warfarin (COUMADIN) 5 MG tablet Take 1 tablet (5 mg total) by mouth Daily. 30 tablet 3     No current facility-administered medications for this visit.      Physical Exam:   /60 (BP Location: Left arm, Patient Position: Sitting, BP Method: Large (Automatic))   Pulse (!) 54   Temp 98 °F (36.7 °C) (Oral)   Resp 18   Ht 6' (1.829 m)   Wt 86.7 kg (191 lb 2.2 oz)   SpO2 99%   BMI 25.92 kg/m²      ECOG Performance status: 0    Physical Exam  Vitals reviewed.   Constitutional:       General: He is not in acute distress.     Appearance: Normal appearance. He is not ill-appearing, toxic-appearing or diaphoretic.   HENT:      Head: Normocephalic and atraumatic.      Right Ear: External ear normal.      Left Ear: External ear normal.      Nose: Nose normal. No congestion.      Mouth/Throat:      Pharynx: Oropharynx is clear.   Eyes:      General: No scleral icterus.     Extraocular Movements: Extraocular movements intact.      Conjunctiva/sclera: Conjunctivae normal.      Pupils: Pupils are equal, round, and reactive to light.   Neck:       Comments: L neck wound well-healed  Cardiovascular:      Rate and Rhythm: Normal rate and regular rhythm.      Heart sounds: Normal heart sounds. No murmur heard.  Pulmonary:      Effort: Pulmonary effort is normal. No respiratory distress.      Breath sounds: Normal breath sounds. No wheezing or rales.   Abdominal:      General: Bowel sounds are normal. There is no distension.      Palpations: Abdomen is soft.      Tenderness: There is no abdominal tenderness.   Musculoskeletal:         General: No swelling.      Cervical back: Normal range of motion.   Lymphadenopathy:      Cervical: No cervical adenopathy.   Skin:     Coloration: Skin is not jaundiced.      Findings: Rash (grade I rash to upper chest/back. faint rash noted across lower abdomen and back, non-pruritic) present. No bruising or erythema.   Neurological:      General: No focal deficit present.      Mental Status: He is alert and oriented to person, place, and time. Mental status is at baseline.      Cranial Nerves: No cranial nerve deficit.      Motor: No weakness.      Gait: Gait normal.   Psychiatric:         Mood and Affect: Mood normal.         Behavior: Behavior normal.         Thought Content: Thought content normal.         Judgment: Judgment normal.       Labs:   Recent Results (from the past 48 hour(s))   CBC W/ AUTO DIFFERENTIAL    Collection Time: 07/24/23  8:45 AM   Result Value Ref Range    WBC 4.93 3.90 - 12.70 K/uL    RBC 4.79 4.60 - 6.20 M/uL    Hemoglobin 13.5 (L) 14.0 - 18.0 g/dL    Hematocrit 44.2 40.0 - 54.0 %    MCV 92 82 - 98 fL    MCH 28.2 27.0 - 31.0 pg    MCHC 30.5 (L) 32.0 - 36.0 g/dL    RDW 15.4 (H) 11.5 - 14.5 %    Platelets 173 150 - 450 K/uL    MPV 11.0 9.2 - 12.9 fL    Immature Granulocytes 0.4 0.0 - 0.5 %    Gran # (ANC) 3.8 1.8 - 7.7 K/uL    Immature Grans (Abs) 0.02 0.00 - 0.04 K/uL    Lymph # 0.5 (L) 1.0 - 4.8 K/uL    Mono # 0.4 0.3 - 1.0 K/uL    Eos # 0.2 0.0 - 0.5 K/uL    Baso # 0.04 0.00 - 0.20 K/uL    nRBC 0 0  /100 WBC    Gran % 77.8 (H) 38.0 - 73.0 %    Lymph % 9.9 (L) 18.0 - 48.0 %    Mono % 7.7 4.0 - 15.0 %    Eosinophil % 3.4 0.0 - 8.0 %    Basophil % 0.8 0.0 - 1.9 %    Differential Method Automated    COMPREHENSIVE METABOLIC PANEL    Collection Time: 23  8:45 AM   Result Value Ref Range    Sodium 141 136 - 145 mmol/L    Potassium 3.9 3.5 - 5.1 mmol/L    Chloride 101 95 - 110 mmol/L    CO2 32 (H) 23 - 29 mmol/L    Glucose 108 70 - 110 mg/dL    BUN 11 8 - 23 mg/dL    Creatinine 0.9 0.5 - 1.4 mg/dL    Calcium 10.0 8.7 - 10.5 mg/dL    Total Protein 7.5 6.0 - 8.4 g/dL    Albumin 4.2 3.5 - 5.2 g/dL    Total Bilirubin 1.1 (H) 0.1 - 1.0 mg/dL    Alkaline Phosphatase 107 55 - 135 U/L    AST 26 10 - 40 U/L    ALT 28 10 - 44 U/L    eGFR >60.0 >60 mL/min/1.73 m^2    Anion Gap 8 8 - 16 mmol/L   Magnesium    Collection Time: 23  8:45 AM   Result Value Ref Range    Magnesium 1.8 1.6 - 2.6 mg/dL   PHOSPHORUS    Collection Time: 23  8:45 AM   Result Value Ref Range    Phosphorus 3.2 2.7 - 4.5 mg/dL   BILIRUBIN, DIRECT    Collection Time: 23  8:45 AM   Result Value Ref Range    Bilirubin, Direct 0.5 (H) 0.1 - 0.3 mg/dL   URIC ACID    Collection Time: 23  8:45 AM   Result Value Ref Range    Uric Acid 4.4 3.4 - 7.0 mg/dL       I have reviewed the pertinent labs from 23 which are notable for mild anemia and leukopenia.  Cr 0.8, normal LFTs.  TSH 0.35 with normal Free T4.    Imagin2022 - EUS  Impression:             - Esophageal mucosal changes consistent with Paredes's esophagus.   - Partially obstructing, malignant esophageal tumor was found in the lower third of the esophagus.   - Normal stomach.   - Normal examined duodenum.   - A mass was found in the lower third of the esophagus. A tissue diagnosis was obtained prior to this exam. This is of adenocarcinoma. This was staged T3 (based on invasion into) N0 Mx by endosonographic criteria.   - No specimens collected.     3/1/22 - PET CT    Impression:     1.  Decreased uptake in persistent distal esophageal thickening suggestive of partial response to therapy.  Previous non hypermetabolic gastrohepatic and perigastric lymph nodes are stable to slightly decreased in size.     2.  No new FDG avid lesions to suggest mixed response to therapy.     3.  Incidental new hypermetabolic gluteal cutaneous thickening, likely benign.  Recommend correlation with history and physical examination.    Path:   3/14/23:  Final Pathologic Diagnosis 1. LYMPH NODE, LEVEL 7, EXCISION:   One benign lymph node (0/1)   2. TOTAL THORACIC ESOPHAGECTOMY AND PROXIMAL STOMACH:   Adenocarcinoma, moderately differentiated, 3.0 cm   Margins are negative   Tumor invades adventitia   Extensive residual cancer with no evident tumor regression (poor or no   response, score 3)   Eleven benign lymph nodes (0/11)   3. RESIDUAL CONDUIT, EXCISION:   Negative for malignancy   SYNOPTIC REPORT   Procedure - Esophageal gastrectomy   Tumor site - GE Junction   Relationship of tumor to esophagogastric junction - Lesion is central at GE   junction   Tumor size - 3.0 x 3.1cm   Histologic type - Adenocarcinoma   Histologic grade - Moderately differentiated   Microscopic tumor extension - Tumor invades adventitia   Margins - All margins of resection are uninvolved, proximal, distal and   adventitial margins are negative   Treatment effect - Extensive residual cancer with no evident tumor regression   (poor or no response, score 3)   Lymphovascular invasion - Not identified   Pathologic staging - yp T3 N0 Mx   Lymph nodes examined - 12   Lymph nodes involved - 0   Additional pathologic findings - Intestinal metaplasia present   MMR-IHC has been performed on previous biopsy ([id number]) and shows all 4   antibodies intact          Assessment:       No diagnosis found.    Plan:     # Esophageal adenocarcinoma   Mr. Person is a pleasant 67 year old male who presents to our clinic for management of  esophageal cancer. He initially presented with dysphagia, and further workup confirmed a stage II adenocarcinoma, pMMR. Tumor staged T3N0Mx by endosonographic criteria, JEVON. CT CAP on 12/14/22 confirmed no evidence of metastatic disease.    Previously conversation regarding his diagnosis and treatment options.  Recommended perioperative chemo/radiation per the CROSS trial. Discussed chemoradiation for ~5 weeks with 5 doses of weekly carboplatin and taxol administered. Plan to obtain restaging scans 4-5 weeks after radiation completed.     He was a candidate for a cooperative group trial assessing perioperative immunotherapy treatment. He consented for this study - ECOG-ACRIN [id number]: A Phase II/III Study of Dilcia-operative Nivolumab and Ipilimumab in Patients with Locoregional Esophageal and Gastroesophageal Junction Adenocarcinoma    Previously met with Dr. Santana Brown who agreed he was a surgical candidate.  Previously met with Dr. Javier Moulton who will be treating him with radiation.    Began cycle 1 carboplatin/paclitaxel/nivolumab on trial on 12/29/22.  Received cycle 4 of weekly chemotherapy on trial on 1/20/23.   We held chemotherapy for week 5 because of thrombocytopenia per protocol.    Completed radiation on 2/1/23.    Restaging PET/CT personally reviewed on 3/1/23 shows interval decreased FDG avidity in esophageal tumor, no new sites of disease.  CT CAP 3/2/23 shows stable esophageal thickening.    Underwent robotic esophagectomy on 3/14/23 with Dr. Brown.  Pathology showed negative margins, ypT3 N0 tumor with 0 of 12 lymph nodes involved.  No treatment effect was noted on the tumor tissue.    Discussed that per the [id number] protocol will proceed with randomization to adjuvant nivolumab +/- ipilimumab.  Patient randomized to receive adjuvant Nivolumab, started cycle 1 at 480 mg q4 weeks 5/1/23.  Plan for twelve months per protocol.    Orders and labs reviewed. Bilirubin (total and direct) mildly  elevated. Will monitor closely going forward.     Proceed with cycle 4 today at 480 mg q4 weeks.  Timeout occurred with myself and Maria Esther SORTO. Orders signed.    Underwent dilation with Dr. Brown on 5/31/23 with temporary improvement in dysphagia.  Weight has decreased since last visit. Scheduled with CARMELA Morales on 7/31. Encouraged to increase caloric intake as tolerated.   PD-L1 CPS 30.    RTC in 4 weeks.    # Vocal cord paralysis  Post-op. S/p injection by ENT.  Improving.    # HTN, HLD, DM, CKD  Following with PCP Dr. Wilson and nephrologist Dr. Oconnell.   BP controlled in clinic today. Has been off his HCTZ/lisinopril because of prior hypotension.  Cr stable.    # CAD, A fib, CHF  Following with cardiologist Dr. Nick & EP Dr. Phlilip.   Hold Xarelto temporarily for EGD. Plans to switch to coumadin.   Continue medical management.     # Dental Infection  Had another root canal  Should not interfere with nivolumab treatment.  Monitor.     # Rash  Grade I, very mild. Likely 2/2 immunotherapy.   Rx kenalog sent to pharmacy today.   Continue to monitor.     Follow up: 4 weeks per research.    Patient is in agreement with the proposed treatment plan. All questions were answered to the patient's satisfaction. Pt knows to call clinic if anything is needed before the next clinic visit.    Patient discussed with collaborating physician, Dr. Medrano.    At least 40 minutes were spent today on this encounter including face to face time with the patient, data gathering/interpretation and documentation.       Bernadette Patterson, MSN, APRN, ACCNS-AG  Hematology and Medical Oncology  Clinical Nurse Specialist to Dr. Medrano, Dr. Smith & Dr. David    Route Chart for Scheduling    Med Onc Chart Routing      Follow up with physician . Per research   Follow up with SAMUEL    Infusion scheduling note    Injection scheduling note    Labs    Imaging    Pharmacy appointment    Other referrals        Treatment Plan Information   [facility] [id number]  ARM C ADJUVANT NIVOLUMAB STEP 2   Miki Medrano MD   Upcoming Treatment Dates - [facility] [id number] ARM C ADJUVANT NIVOLUMAB STEP 2    7/24/2023       Chemotherapy       INV nivolumab 480 mg in INV sodium chloride 0.9 % 100 mL chemo infusion  8/21/2023       Chemotherapy       INV nivolumab 480 mg in INV sodium chloride 0.9 % 100 mL chemo infusion  9/18/2023       Chemotherapy       INV nivolumab 480 mg in INV sodium chloride 0.9 % 100 mL chemo infusion  10/16/2023       Chemotherapy       INV nivolumab 480 mg in INV sodium chloride 0.9 % 100 mL chemo infusion    Supportive Plan Information  IV FLUIDS AND ELECTROLYTES   Miki Medrano MD   Upcoming Treatment Dates - IV FLUIDS AND ELECTROLYTES    No upcoming days in selected categories.    Therapy Plan Information  Flushes  heparin, porcine (PF) 100 unit/mL injection flush 500 Units  500 Units, Intravenous, Every visit  sodium chloride 0.9% flush 10 mL  10 mL, Intravenous, Every visit

## 2023-07-25 ENCOUNTER — INFUSION (OUTPATIENT)
Dept: INFUSION THERAPY | Facility: HOSPITAL | Age: 68
End: 2023-07-25
Payer: MEDICARE

## 2023-07-25 ENCOUNTER — RESEARCH ENCOUNTER (OUTPATIENT)
Dept: RESEARCH | Facility: HOSPITAL | Age: 68
End: 2023-07-25
Payer: MEDICARE

## 2023-07-25 ENCOUNTER — PATIENT MESSAGE (OUTPATIENT)
Dept: ADMINISTRATIVE | Facility: OTHER | Age: 68
End: 2023-07-25
Payer: MEDICARE

## 2023-07-25 VITALS
OXYGEN SATURATION: 99 % | RESPIRATION RATE: 18 BRPM | TEMPERATURE: 98 F | HEART RATE: 52 BPM | WEIGHT: 191.56 LBS | SYSTOLIC BLOOD PRESSURE: 135 MMHG | HEIGHT: 72 IN | DIASTOLIC BLOOD PRESSURE: 64 MMHG | BODY MASS INDEX: 25.95 KG/M2

## 2023-07-25 DIAGNOSIS — C15.9 ESOPHAGEAL ADENOCARCINOMA: Primary | ICD-10-CM

## 2023-07-25 DIAGNOSIS — I48.0 PAROXYSMAL ATRIAL FIBRILLATION: ICD-10-CM

## 2023-07-25 DIAGNOSIS — N20.0 NEPHROLITHIASIS: Primary | ICD-10-CM

## 2023-07-25 PROCEDURE — 25000003 PHARM REV CODE 250: Performed by: REGISTERED NURSE

## 2023-07-25 PROCEDURE — 96413 CHEMO IV INFUSION 1 HR: CPT | Mod: Q1

## 2023-07-25 RX ORDER — HEPARIN 100 UNIT/ML
500 SYRINGE INTRAVENOUS
Status: DISCONTINUED | OUTPATIENT
Start: 2023-07-25 | End: 2023-07-25 | Stop reason: HOSPADM

## 2023-07-25 RX ORDER — DIPHENHYDRAMINE HYDROCHLORIDE 50 MG/ML
50 INJECTION INTRAMUSCULAR; INTRAVENOUS ONCE AS NEEDED
Status: DISCONTINUED | OUTPATIENT
Start: 2023-07-25 | End: 2023-07-25 | Stop reason: HOSPADM

## 2023-07-25 RX ORDER — EPINEPHRINE 0.3 MG/.3ML
0.3 INJECTION SUBCUTANEOUS ONCE AS NEEDED
Status: DISCONTINUED | OUTPATIENT
Start: 2023-07-25 | End: 2023-07-25 | Stop reason: HOSPADM

## 2023-07-25 RX ORDER — SODIUM CHLORIDE 0.9 % (FLUSH) 0.9 %
10 SYRINGE (ML) INJECTION
Status: DISCONTINUED | OUTPATIENT
Start: 2023-07-25 | End: 2023-07-25 | Stop reason: HOSPADM

## 2023-07-25 RX ADMIN — SODIUM CHLORIDE: 9 INJECTION, SOLUTION INTRAVENOUS at 01:07

## 2023-07-25 NOTE — PROGRESS NOTES
: URIEL Manriquez MD  Treating Investigators: NIRAV Medrano MD  Surgical Oncologist: SARAH rBown M.D. 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 Patient queried & verbalized his consent for continued participation in above-mentioned trial. Patient queried and denies any changes to his health or conmeds since yesterday.     Per protocol, Nivolumab is administered at a 480 mg flat dose & cannot be modified. MACARIO & Bernadette performed time-out in exam room during yesterday's appointment     Infusion RN Sharon Dawn was instructed to administer the treatment per the treatment plan with full sets of vital signs pre- and post-dose. RN expressed their understanding of all instructions. Patient completed treatment today without event & was DC'd from clinic ambulatory and in stable condition. Please see Infusion RN note for further information.     Patient was encouraged to contact MACARIO or Dr. Medrano's team with any questions or concerns & was reminded of clinic's 24/7 emergency contact number. Patient verbalized understanding & denies any additional questions at this time.          Step 2 -- C5D1:  [id number] @ 0810 - Safety labs [facility] 3rd floor  [id number] @ 0900 - [facility] 2nd floor  [id number] @ 1300 - infusion (Nivolumab only)        Solicited AEs -- Assessed [id number]:  ** Anemia - Grade 1 -- (NCS per MD) -- continued from prior visit  Platelet count decreased - Grade 0   White blood cell count decreased - Grade 0   Neutrophil count decreased - Grade 0  **  Lymphocyte count decreased - Grade 2   (NCS per MD)    6/26/2023 = Grade 3 -- NCS per MD   7/24/2023 = Grade 2 -- NCS per MD  Peripheral motor neuropathy - Grade 0   Peripheral sensory neuropathy - Grade 0   Creatinine increased - Grade 0  Hypothyroidism - Grade 0   (TSH WNL on 24Jul2023)  Diarrhea (patient baseline BM = 2 stools a day) - Grade 0 -- reports loose stools vs. diarrhea   **Fatigue - Grade 2   **Nausea - Grade 1  **Cough - Grade 1  -- Medical History  Pneumonitis - Grade 0  **Hyperglycemia - Grade 0 -- Medical History 2/2 DMT2    Non-fasting glucose WNL 6/26/2023 & 7/24/2023  Adrenal Insufficiency - Grade 0  **Rash-maculo-papular - Grade 1 - 17Jul2023 - ongoing ( used Sarna [camphor 0.5% - menthol 0.5&] PRN -- Prescribed triamcinolone 24Jul2023 ) -- (NCS per MD)  **Pruritis - Grade 1 - 17Jul2023 -- (NCS per MD)  Erythema multiforme - Grade 0  Vomiting - Grade 0    Lipase increased -- Not required per protocol and therefore not assessed this visit.      Ongoing Non-Solicited AEs:  Anorexia - Grade 1 - 09Jan2023 - ongoing (no treatment)  Dysgeusia - Grade 1 - 14Mar2023 - ongoing ( no treatment )  Generalized weakness - Grade 1 - [id number] - ongoing ( no treatment )  Nausea - Grade 1 - [id number] - ongoing ( no treatment )  Hoarseness - Grade 2 - [id number] - ongoing ( no treatment )  Dysphagia - Grade 1 - 16Jun2023 - ongoing  Weight loss - Grade 2 - [id number] - ongoing    New or Changed AEs Since Last Visit:  None        Complete Baseline Medical History, Adverse Events, and Concomitant Medications are located in patient's shadow chart

## 2023-07-25 NOTE — PLAN OF CARE
Problem: Adult Inpatient Plan of Care  Goal: Plan of Care Review  Outcome: Ongoing, Progressing   Patient tolerated infusion today. NAD noted. VSS. Discharged home and ambulates independently

## 2023-07-28 ENCOUNTER — TELEPHONE (OUTPATIENT)
Dept: HEMATOLOGY/ONCOLOGY | Facility: CLINIC | Age: 68
End: 2023-07-28
Payer: MEDICARE

## 2023-07-28 DIAGNOSIS — G62.0 CHEMOTHERAPY-INDUCED NEUROPATHY: ICD-10-CM

## 2023-07-28 DIAGNOSIS — T45.1X5A CHEMOTHERAPY-INDUCED NEUROPATHY: ICD-10-CM

## 2023-07-28 DIAGNOSIS — M54.50 CHRONIC LOW BACK PAIN: Primary | ICD-10-CM

## 2023-07-28 DIAGNOSIS — R29.3 POOR POSTURE: ICD-10-CM
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
## 2023-07-28 DIAGNOSIS — K11.7 XEROSTOMIA DUE TO RADIOTHERAPY: ICD-10-CM

## 2023-07-28 DIAGNOSIS — C15.9 ESOPHAGEAL ADENOCARCINOMA: ICD-10-CM

## 2023-07-28 DIAGNOSIS — G89.29 CHRONIC LOW BACK PAIN: Primary | ICD-10-CM

## 2023-07-28 NOTE — NURSING
Integrative therapies discussed in great length, acupuncture and OT yoga orders placed per protocol. Patient confirmed f/u appt with Dr. Valdovinos 8/25/23. He is losing weight, fatigued and interested in her recommendations for greater well-being. MARY Frausto.

## 2023-07-31 ENCOUNTER — PATIENT MESSAGE (OUTPATIENT)
Dept: HEMATOLOGY/ONCOLOGY | Facility: CLINIC | Age: 68
End: 2023-07-31

## 2023-08-01 ENCOUNTER — PATIENT MESSAGE (OUTPATIENT)
Dept: ADMINISTRATIVE | Facility: OTHER | Age: 68
End: 2023-08-01
Payer: MEDICARE

## 2023-08-08 ENCOUNTER — TELEPHONE (OUTPATIENT)
Dept: ENDOSCOPY | Facility: HOSPITAL | Age: 68
End: 2023-08-08

## 2023-08-08 ENCOUNTER — CLINICAL SUPPORT (OUTPATIENT)
Dept: ENDOSCOPY | Facility: HOSPITAL | Age: 68
End: 2023-08-08
Attending: INTERNAL MEDICINE
Payer: MEDICARE

## 2023-08-08 VITALS — BODY MASS INDEX: 25.95 KG/M2 | HEIGHT: 72 IN | WEIGHT: 191.56 LBS

## 2023-08-08 DIAGNOSIS — Z12.11 SCREEN FOR COLON CANCER: ICD-10-CM

## 2023-08-08 NOTE — TELEPHONE ENCOUNTER
Dear Provider,    Patient has a scheduled procedure Colonoscopy on 10/20/23 and is currently taking a blood thinner prescribed by your office. In order to ensure patient safety, we would like to confirm that the patient can place their blood thinner medication on hold for the procedure. Can he/she discontinue Xarelto (rivaroxaban) for a minimum of 2 days prior to the procedure?     Thank you for your prompt reply.    [facility] Endoscopy Scheduling

## 2023-08-08 NOTE — PLAN OF CARE
Spoke to patient to schedule procedure(s) Colonoscopy       Physician to perform procedure(s) Dr. NIRAV Farnsworth  Date of Procedure (s) 10/20/23  Arrival Time 1:00 PM  Time of Procedure(s) 2:00 PM   Location of Procedure(s) [facility] 4th Floor  Type of Rx Prep sent to patient: PEG  Instructions provided to patient via [facility]    Patient was informed on the following information and verbalized understanding. Screening questionnaire reviewed with patient and complete. If procedure requires anesthesia, a responsible adult needs to be present to accompany the patient home, patient cannot drive after receiving anesthesia. Appointment details are tentative, especially check-in time. Patient will receive a prep-op call 4 days prior to confirm check-in time for procedure. If applicable the patient should contact their pharmacy to verify Rx for procedure prep is ready for pick-up. Patient was advised to call the scheduling department at [phone] if pharmacy states no Rx is available. Patient was advised to call the endoscopy scheduling department if any questions or concerns arise.      [facility]oscopy Scheduling Department

## 2023-08-08 NOTE — TELEPHONE ENCOUNTER
Spoke to patient to schedule procedure(s) Colonoscopy       Physician to perform procedure(s) Dr. NIRAV Farnsworth  Date of Procedure (s) 10/20/23  Arrival Time 1:00 PM  Time of Procedure(s) 2:00 PM   Location of Procedure(s) [facility] 4th Floor  Type of Rx Prep sent to patient: PEG  Instructions provided to patient via [facility]    Patient was informed on the following information and verbalized understanding. Screening questionnaire reviewed with patient and complete. If procedure requires anesthesia, a responsible adult needs to be present to accompany the patient home, patient cannot drive after receiving anesthesia. Appointment details are tentative, especially check-in time. Patient will receive a prep-op call 4 days prior to confirm check-in time for procedure. If applicable the patient should contact their pharmacy to verify Rx for procedure prep is ready for pick-up. Patient was advised to call the scheduling department at [phone] if pharmacy states no Rx is available. Patient was advised to call the endoscopy scheduling department if any questions or concerns arise.      [facility]oscopy Scheduling Department

## 2023-08-09 ENCOUNTER — PATIENT MESSAGE (OUTPATIENT)
Dept: ADMINISTRATIVE | Facility: OTHER | Age: 68
End: 2023-08-09
Payer: MEDICARE

## 2023-08-11 ENCOUNTER — PATIENT MESSAGE (OUTPATIENT)
Dept: ADMINISTRATIVE | Facility: OTHER | Age: 68
End: 2023-08-11
Payer: MEDICARE

## 2023-08-13 ENCOUNTER — PATIENT MESSAGE (OUTPATIENT)
Dept: ADMINISTRATIVE | Facility: OTHER | Age: 68
End: 2023-08-13
Payer: MEDICARE

## 2023-08-15 ENCOUNTER — PATIENT MESSAGE (OUTPATIENT)
Dept: ADMINISTRATIVE | Facility: OTHER | Age: 68
End: 2023-08-15
Payer: MEDICARE

## 2023-08-16 ENCOUNTER — TELEPHONE (OUTPATIENT)
Dept: ENDOSCOPY | Facility: HOSPITAL | Age: 68
End: 2023-08-16
Payer: MEDICARE

## 2023-08-16 ENCOUNTER — PATIENT MESSAGE (OUTPATIENT)
Dept: ADMINISTRATIVE | Facility: OTHER | Age: 68
End: 2023-08-16
Payer: MEDICARE

## 2023-08-16 DIAGNOSIS — N20.0 NEPHROLITHIASIS: Primary | ICD-10-CM

## 2023-08-17 ENCOUNTER — PATIENT MESSAGE (OUTPATIENT)
Dept: ADMINISTRATIVE | Facility: OTHER | Age: 68
End: 2023-08-17
Payer: MEDICARE

## 2023-08-18 ENCOUNTER — PATIENT MESSAGE (OUTPATIENT)
Dept: ADMINISTRATIVE | Facility: OTHER | Age: 68
End: 2023-08-18
Payer: MEDICARE

## 2023-08-20 ENCOUNTER — PATIENT MESSAGE (OUTPATIENT)
Dept: ADMINISTRATIVE | Facility: OTHER | Age: 68
End: 2023-08-20
Payer: MEDICARE

## 2023-08-21 ENCOUNTER — PATIENT MESSAGE (OUTPATIENT)
Dept: ADMINISTRATIVE | Facility: OTHER | Age: 68
End: 2023-08-21
Payer: MEDICARE

## 2023-08-21 ENCOUNTER — LAB VISIT (OUTPATIENT)
Dept: LAB | Facility: HOSPITAL | Age: 68
End: 2023-08-21
Payer: MEDICARE

## 2023-08-21 ENCOUNTER — OFFICE VISIT (OUTPATIENT)
Dept: HEMATOLOGY/ONCOLOGY | Facility: CLINIC | Age: 68
End: 2023-08-21
Payer: MEDICARE

## 2023-08-21 ENCOUNTER — RESEARCH ENCOUNTER (OUTPATIENT)
Dept: RESEARCH | Facility: HOSPITAL | Age: 68
End: 2023-08-21
Payer: MEDICARE

## 2023-08-21 VITALS
BODY MASS INDEX: 25.61 KG/M2 | HEART RATE: 53 BPM | OXYGEN SATURATION: 98 % | DIASTOLIC BLOOD PRESSURE: 61 MMHG | HEIGHT: 72 IN | WEIGHT: 189.06 LBS | TEMPERATURE: 98 F | RESPIRATION RATE: 18 BRPM | SYSTOLIC BLOOD PRESSURE: 130 MMHG

## 2023-08-21 DIAGNOSIS — N18.30 TYPE 2 DIABETES MELLITUS WITH STAGE 3 CHRONIC KIDNEY DISEASE, WITHOUT LONG-TERM CURRENT USE OF INSULIN, UNSPECIFIED WHETHER STAGE 3A OR 3B CKD: ICD-10-CM

## 2023-08-21 DIAGNOSIS — I15.2 HYPERTENSION ASSOCIATED WITH DIABETES: ICD-10-CM

## 2023-08-21 DIAGNOSIS — C15.9 ESOPHAGEAL ADENOCARCINOMA: Primary | ICD-10-CM

## 2023-08-21 DIAGNOSIS — R21 RASH: ICD-10-CM

## 2023-08-21 DIAGNOSIS — J38.00 VOCAL CORD PARALYSIS: ICD-10-CM

## 2023-08-21 DIAGNOSIS — I48.0 PAROXYSMAL ATRIAL FIBRILLATION: ICD-10-CM

## 2023-08-21 DIAGNOSIS — E11.22 TYPE 2 DIABETES MELLITUS WITH STAGE 3 CHRONIC KIDNEY DISEASE, WITHOUT LONG-TERM CURRENT USE OF INSULIN, UNSPECIFIED WHETHER STAGE 3A OR 3B CKD: ICD-10-CM

## 2023-08-21 DIAGNOSIS — R13.10 DYSPHAGIA, UNSPECIFIED TYPE: ICD-10-CM

## 2023-08-21 DIAGNOSIS — I25.10 CORONARY ARTERY DISEASE INVOLVING NATIVE CORONARY ARTERY OF NATIVE HEART WITHOUT ANGINA PECTORIS: ICD-10-CM

## 2023-08-21 DIAGNOSIS — E78.5 HYPERLIPIDEMIA ASSOCIATED WITH TYPE 2 DIABETES MELLITUS: ICD-10-CM

## 2023-08-21 DIAGNOSIS — K04.7 DENTAL INFECTION: ICD-10-CM

## 2023-08-21 DIAGNOSIS — E11.69 HYPERLIPIDEMIA ASSOCIATED WITH TYPE 2 DIABETES MELLITUS: ICD-10-CM

## 2023-08-21 DIAGNOSIS — I50.20 HFREF (HEART FAILURE WITH REDUCED EJECTION FRACTION): ICD-10-CM

## 2023-08-21 DIAGNOSIS — C15.9 ESOPHAGEAL ADENOCARCINOMA: ICD-10-CM

## 2023-08-21 DIAGNOSIS — Z00.6 CLINICAL TRIAL PARTICIPANT: ICD-10-CM

## 2023-08-21 DIAGNOSIS — E11.59 HYPERTENSION ASSOCIATED WITH DIABETES: ICD-10-CM

## 2023-08-21 LAB
ALBUMIN SERPL BCP-MCNC: 4 G/DL (ref 3.5–5.2)
ALP SERPL-CCNC: 104 U/L (ref 55–135)
ALT SERPL W/O P-5'-P-CCNC: 26 U/L (ref 10–44)
ANION GAP SERPL CALC-SCNC: 8 MMOL/L (ref 8–16)
AST SERPL-CCNC: 27 U/L (ref 10–40)
BASOPHILS # BLD AUTO: 0.02 K/UL (ref 0–0.2)
BASOPHILS NFR BLD: 0.4 % (ref 0–1.9)
BILIRUB DIRECT SERPL-MCNC: 0.3 MG/DL (ref 0.1–0.3)
BILIRUB SERPL-MCNC: 0.6 MG/DL (ref 0.1–1)
BUN SERPL-MCNC: 16 MG/DL (ref 8–23)
CALCIUM SERPL-MCNC: 9.5 MG/DL (ref 8.7–10.5)
CHLORIDE SERPL-SCNC: 104 MMOL/L (ref 95–110)
CO2 SERPL-SCNC: 30 MMOL/L (ref 23–29)
CREAT SERPL-MCNC: 1.1 MG/DL (ref 0.5–1.4)
DIFFERENTIAL METHOD: ABNORMAL
EOSINOPHIL # BLD AUTO: 0.1 K/UL (ref 0–0.5)
EOSINOPHIL NFR BLD: 2.6 % (ref 0–8)
ERYTHROCYTE [DISTWIDTH] IN BLOOD BY AUTOMATED COUNT: 14.8 % (ref 11.5–14.5)
EST. GFR  (NO RACE VARIABLE): >60 ML/MIN/1.73 M^2
GLUCOSE SERPL-MCNC: 152 MG/DL (ref 70–110)
HCT VFR BLD AUTO: 41.6 % (ref 40–54)
HGB BLD-MCNC: 13 G/DL (ref 14–18)
IMM GRANULOCYTES # BLD AUTO: 0.01 K/UL (ref 0–0.04)
IMM GRANULOCYTES NFR BLD AUTO: 0.2 % (ref 0–0.5)
LYMPHOCYTES # BLD AUTO: 0.5 K/UL (ref 1–4.8)
LYMPHOCYTES NFR BLD: 10.7 % (ref 18–48)
MAGNESIUM SERPL-MCNC: 1.8 MG/DL (ref 1.6–2.6)
MCH RBC QN AUTO: 28 PG (ref 27–31)
MCHC RBC AUTO-ENTMCNC: 31.3 G/DL (ref 32–36)
MCV RBC AUTO: 90 FL (ref 82–98)
MONOCYTES # BLD AUTO: 0.4 K/UL (ref 0.3–1)
MONOCYTES NFR BLD: 8.9 % (ref 4–15)
NEUTROPHILS # BLD AUTO: 3.8 K/UL (ref 1.8–7.7)
NEUTROPHILS NFR BLD: 77.2 % (ref 38–73)
NRBC BLD-RTO: 0 /100 WBC
PHOSPHATE SERPL-MCNC: 3.4 MG/DL (ref 2.7–4.5)
PLATELET # BLD AUTO: 150 K/UL (ref 150–450)
PMV BLD AUTO: 11 FL (ref 9.2–12.9)
POTASSIUM SERPL-SCNC: 3.9 MMOL/L (ref 3.5–5.1)
PROT SERPL-MCNC: 7 G/DL (ref 6–8.4)
RBC # BLD AUTO: 4.65 M/UL (ref 4.6–6.2)
SODIUM SERPL-SCNC: 142 MMOL/L (ref 136–145)
WBC # BLD AUTO: 4.96 K/UL (ref 3.9–12.7)

## 2023-08-21 PROCEDURE — 82248 BILIRUBIN DIRECT: CPT | Mod: Q1 | Performed by: INTERNAL MEDICINE

## 2023-08-21 PROCEDURE — 99999 PR PBB SHADOW E&M-EST. PATIENT-LVL IV: CPT | Mod: PBBFAC,,, | Performed by: INTERNAL MEDICINE

## 2023-08-21 PROCEDURE — 99215 OFFICE O/P EST HI 40 MIN: CPT | Mod: Q1,S$PBB,, | Performed by: INTERNAL MEDICINE

## 2023-08-21 PROCEDURE — 85025 COMPLETE CBC W/AUTO DIFF WBC: CPT | Mod: Q1 | Performed by: INTERNAL MEDICINE

## 2023-08-21 PROCEDURE — 80053 COMPREHEN METABOLIC PANEL: CPT | Performed by: INTERNAL MEDICINE

## 2023-08-21 PROCEDURE — 83735 ASSAY OF MAGNESIUM: CPT | Performed by: INTERNAL MEDICINE

## 2023-08-21 PROCEDURE — 99215 PR OFFICE/OUTPT VISIT, EST, LEVL V, 40-54 MIN: ICD-10-PCS | Mod: Q1,S$PBB,, | Performed by: INTERNAL MEDICINE

## 2023-08-21 PROCEDURE — 36415 COLL VENOUS BLD VENIPUNCTURE: CPT | Mod: Q1 | Performed by: INTERNAL MEDICINE

## 2023-08-21 PROCEDURE — 99999 PR PBB SHADOW E&M-EST. PATIENT-LVL IV: ICD-10-PCS | Mod: PBBFAC,,, | Performed by: INTERNAL MEDICINE

## 2023-08-21 PROCEDURE — 84100 ASSAY OF PHOSPHORUS: CPT | Performed by: INTERNAL MEDICINE

## 2023-08-21 PROCEDURE — 99214 OFFICE O/P EST MOD 30 MIN: CPT | Mod: PBBFAC,Q1 | Performed by: INTERNAL MEDICINE

## 2023-08-21 RX ORDER — DIPHENHYDRAMINE HYDROCHLORIDE 50 MG/ML
50 INJECTION INTRAMUSCULAR; INTRAVENOUS ONCE AS NEEDED
Status: CANCELLED | OUTPATIENT
Start: 2023-08-21

## 2023-08-21 RX ORDER — SODIUM CHLORIDE 0.9 % (FLUSH) 0.9 %
10 SYRINGE (ML) INJECTION
Status: CANCELLED | OUTPATIENT
Start: 2023-08-21

## 2023-08-21 RX ORDER — EPINEPHRINE 0.3 MG/.3ML
0.3 INJECTION SUBCUTANEOUS ONCE AS NEEDED
Status: CANCELLED | OUTPATIENT
Start: 2023-08-21

## 2023-08-21 RX ORDER — HEPARIN 100 UNIT/ML
500 SYRINGE INTRAVENOUS
Status: CANCELLED | OUTPATIENT
Start: 2023-08-21

## 2023-08-21 NOTE — PROGRESS NOTES
MEDICAL ONCOLOGY - ESTABLISHED PATIENT VISIT    Reason for visit: esophageal cancer    Best Contact Phone Number(s): There are no phone numbers on file.     Cancer/Stage/TNM:    Cancer Staging   Esophageal adenocarcinoma  Staging form: Esophagus - Adenocarcinoma, AJCC 8th Edition  - Pathologic stage from 3/28/2023: Stage II (ypT3, pN0, cM0, G2) - Signed by Santana Brown Jr., MD on 3/28/2023       Oncology History   Esophageal adenocarcinoma   12/8/2022 Initial Diagnosis    Esophageal adenocarcinoma     12/21/2022 - 12/21/2022 Chemotherapy    Treatment Summary   Plan Name: OP ESOPHAGEAL PACLITAXEL CARBOPLATIN WEEKLY  Treatment Goal: Curative  Status: Inactive  Start Date:   End Date:   Provider: Miki Medrano MD  Chemotherapy: CARBOplatin (PARAPLATIN) in sodium chloride 0.9% 250 mL chemo infusion, , Intravenous, Clinic/HOD 1 time, 0 of 1 cycle  PACLitaxeL (TAXOL) 50 mg/m2 = 120 mg in sodium chloride 0.9% 250 mL chemo infusion, 50 mg/m2, Intravenous, Clinic/HOD 1 time, 0 of 1 cycle     12/29/2022 - 1/20/2023 Chemotherapy    Treatment Summary   Plan Name: [facility] [id number] ARM B CARBOPLATIN PACLITAXEL NIVOLUMAB  Treatment Goal: Curative  Status: Inactive  Start Date: 12/29/2022  End Date: 1/20/2023  Provider: Miki Medrano MD  Chemotherapy: CARBOplatin (PARAPLATIN) 215 mg in sodium chloride 0.9% 306.5 mL chemo infusion, 215 mg, Intravenous, Clinic/HOD 1 time, 4 of 5 cycles  Administration: 215 mg (12/29/2022), 230 mg (1/5/2023), 265 mg (1/13/2023), 265 mg (1/20/2023)  PACLitaxeL (TAXOL) 50 mg/m2 = 120 mg in sodium chloride 0.9% 250 mL chemo infusion, 50 mg/m2 = 120 mg, Intravenous, Clinic/HOD 1 time, 4 of 5 cycles  Dose modification: 50 mg/m2 (original dose 50 mg/m2, Cycle 4)  Administration: 120 mg (12/29/2022), 120 mg (1/5/2023), 120 mg (1/13/2023), 120 mg (1/20/2023)     12/29/2022 - 2/1/2023 Radiation Therapy    Treating physician: Dr. Javier Moulton    Course: C1 CHEST 2022    Treatment Site Ref.  ID Energy Dose/Fx (Gy) #Fx Dose Correction (Gy) Total Dose (Gy) Start Date End Date Elapsed Days   IM Esophagus [id number] 6X 1.8 23 / 23 0 41.4 12/29/2022 2/1/2023 34        3/17/2023 Surgery    Procedure: Procedure(s) (LRB):  XI ROBOTIC ESOPHAGECTOMY (N/A)  ROBOTIC JEJUNOSTOMY TUBE INSERTION (N/A)      Surgeon(s) and Role:     * Santana Brown Jr., MD - Primary     * Darian Murphy MD - Assisting     Assistance: Due to having no qualified resident during critical portions of the case, Dr. Darian Murphy acted as an assistant.     Pre-Operative Diagnosis: Esophageal adenocarcinoma, distal 1/3     Post-Operative Diagnosis: Same     Pre-Operative Variables:  Stage: T3 N0  Adjacent organ involvement: None  Chemotherapy within 90 Days: Yes  Radiation Therapy within 90 Days: Yes     Comorbidities:  Morbid obesity  Type 2 diabetes mellitus  Obstructive sleep apnea  Gout  Hyperparathyroidism  Hypertension  Severe obesity  Coronary artery disease  Heart failure with reduced ejection fraction    Procedure:  Robotic-assisted Vj three field esophagectomy  Regional mediastinal lymph node dissection  Botox injection of the pylorus  Jejunostomy tube placement     Operative Findings:                No evidence of distant intraperitoneal metastases in the chest or abdomen  Esophageal tumor located in the distal third of the esophagus, mild radiation changes appreciated.  Resection status:  R0 pending final pathology.  No gross disease remaining post dissection.  Frozen sections on proximal and distal margins negative for malignancy  100u Botox injection into the pylorus  Stapled esophagogastrostomy performed at the neck incision after confirmation of negative margins.  Jejunostomy tube placed      3/28/2023 Cancer Staged    Staging form: Esophagus - Adenocarcinoma, AJCC 8th Edition  - Pathologic stage from 3/28/2023: Stage II (ypT3, pN0, cM0, G2)     5/1/2023 - 5/1/2023 Chemotherapy    Treatment Summary   Plan Name: [facility] [id number]  "ARM C ADJUVANT NIVOLUMAB  Treatment Goal: Curative  Status: Inactive  Start Date: 5/1/2023  End Date: 5/1/2023  Provider: Miki Medrano MD  Chemotherapy: [No matching medication found in this treatment plan]     5/29/2023 -  Chemotherapy    Treatment Summary   Plan Name: [facility] [id number] ARM C ADJUVANT NIVOLUMAB STEP 2  Treatment Goal: Curative  Status: Active  Start Date: 5/29/2023  End Date: 4/29/2024 (Planned)  Provider: Miki Medrano MD  Chemotherapy: [No matching medication found in this treatment plan]          Interim History:   Mr. Person returns to clinic today prior to cycle 5 of adjuvant nivolumab. He underwent robotic esophagectomy on 3/14/23 with Dr. Brown and J tube insertion.     He is feeling well today.  He notes that his weight continues to decline some.  He has coming and going dysphagia but no major change.  He notes "two steps forward, one step back" with his voice as well.  No other new concerns.  Pruritis still present on his back - particularly when he lies in bed.    Presents to clinic alone. ECOG 0.     Review of Systems   Constitutional:  Positive for weight loss. Negative for chills, diaphoresis, fever and malaise/fatigue.   HENT:  Negative for sore throat.    Eyes:  Negative for blurred vision and double vision.   Respiratory:  Negative for cough and shortness of breath.    Cardiovascular:  Negative for chest pain, palpitations and leg swelling.   Gastrointestinal:  Negative for abdominal pain, blood in stool, constipation, diarrhea, heartburn, nausea and vomiting.        Dysphagia   Genitourinary:  Negative for dysuria, frequency, hematuria and urgency.   Musculoskeletal:  Negative for back pain, falls, myalgias and neck pain.   Skin:  Positive for itching and rash.   Neurological:  Negative for dizziness, tingling, weakness and headaches.   Psychiatric/Behavioral:  The patient is not nervous/anxious.        Past Medical History:   Past Medical History:   Diagnosis Date    " Anticoagulant long-term use     Diabetes mellitus     Onset late 50s/early 60s    Hyperlipidemia     Hypertension     Onset late 50s/early 60s    Kidney stones     Sleep apnea     since 2006      Past Surgical History:   Past Surgical History:   Procedure Laterality Date    CORONARY ANGIOGRAPHY N/A 9/30/2020    Procedure: ANGIOGRAM, CORONARY ARTERY;  Surgeon: John West MD;  Location: [facility] CATH LAB;  Service: Cardiology;  Laterality: N/A;    CYSTOSCOPY N/A 2/17/2022    Procedure: CYSTOSCOPY;  Surgeon: Lukasz Hughes MD;  [facility];  Service: Urology;  Laterality: N/A;    CYSTOSCOPY W/ URETERAL STENT PLACEMENT N/A 2/25/2022    Procedure: CYSTOSCOPY, WITH URETERAL STENT INSERTION;  Surgeon: Lukasz Hughes MD;  [facility];  Service: Urology;  Laterality: N/A;    ENDOSCOPIC ULTRASOUND OF UPPER GASTROINTESTINAL TRACT N/A 12/7/2022    Procedure: ULTRASOUND, UPPER GI TRACT, ENDOSCOPIC;  Surgeon: Darian Main MD;  Location: [facility];  Service: Endoscopy;  Laterality: N/A;  Approval to hold Xarelto rec'd from Dr. Nick (see t/e 12/5/22)-DS    ESOPHAGOGASTRODUODENOSCOPY N/A 11/17/2022    Procedure: EGD (ESOPHAGOGASTRODUODENOSCOPY);  Surgeon: Brody Gonzales MD;  Location: [facility] (2ND FLR);  Service: Endoscopy;  Laterality: N/A;  inst via email-ok to hold Xarelto x 2 days-MS    ESOPHAGOGASTRODUODENOSCOPY N/A 5/31/2023    Procedure: EGD (ESOPHAGOGASTRODUODENOSCOPY) WITH DILATION;  Surgeon: Santana Brown Jr., MD;  Location: [facility] OR 2ND FLR;  Service: General;  Laterality: N/A;    LASER LITHOTRIPSY Left 2/25/2022    Procedure: LITHOTRIPSY, USING LASER;  Surgeon: Lukasz Hughes MD;  [facility];  Service: Urology;  Laterality: Left;    LEFT HEART CATHETERIZATION Left 9/30/2020    Procedure: Left heart cath;  Surgeon: John West MD;  Location: [facility] CATH LAB;  Service: Cardiology;  Laterality: Left;    LITHOTRIPSY      PARATHYROIDECTOMY   1/1/2-107    PYELOSCOPY Left 2/25/2022    Procedure: PYELOSCOPY;  Surgeon: Lukasz Hughes MD;  [facility];  Service: Urology;  Laterality: Left;    ROBOT-ASSISTED SURGICAL REMOVAL OF ESOPHAGUS USING DA CARLOS XI N/A 3/14/2023    Procedure: XI ROBOTIC ESOPHAGECTOMY;  Surgeon: Santana Brown Jr., MD;  Location: [facility] OR 2ND FLR;  Service: General;  Laterality: N/A;  Abdomen, Chest and Neck    ROBOTIC JEJUNOSTOMY N/A 3/14/2023    Procedure: ROBOTIC JEJUNOSTOMY TUBE INSERTION;  Surgeon: Santana Brown Jr., MD;  Location: [facility] OR [facility] FLR;  Service: General;  Laterality: N/A;    TRANSESOPHAGEAL ECHOCARDIOGRAPHY N/A 4/7/2022    Procedure: ECHOCARDIOGRAM, TRANSESOPHAGEAL;  Surgeon: Xander Diagnostic Provider;  Location: [facility] EP LAB;  Service: Cardiology;  Laterality: N/A;    TREATMENT OF CARDIAC ARRHYTHMIA N/A 4/7/2022    Procedure: Cardioversion or Defibrillation;  Surgeon: Iris Fisher NP;  Location: [facility] EP LAB;  Service: Cardiology;  Laterality: N/A;  afib, dccv, artie, anes, EH, 3prep    URETEROSCOPIC REMOVAL OF URETERIC CALCULUS Left 2/25/2022    Procedure: REMOVAL, CALCULUS, URETER, URETEROSCOPIC;  Surgeon: Lukasz Hughes MD;  [facility];  Service: Urology;  Laterality: Left;    URETEROSCOPY Left 2/25/2022    Procedure: URETEROSCOPY;  Surgeon: Lukasz Hughes MD;  [facility];  Service: Urology;  Laterality: Left;    VOCAL CORD INJECTION Left 3/17/2023    Procedure: INJECTION, VOCAL CORD, LARYNGOSCOPIC;  Surgeon: Gm Altman MD;  Location: [facility] OR 2ND FLR;  Service: ENT;  Laterality: Left;      Family History:   Family History   Problem Relation Age of Onset    Arthritis Mother     Heart disease Father 70        CABG    Arthritis Father     Diabetes Father     Kidney disease Father         had one kidney removed in early thirties    Arthritis Sister     Arthritis Sister     Hypertension Sister     Colon cancer Brother 32    Arthritis Brother     Alcohol  abuse Paternal Aunt     Cancer Maternal Grandfather         throat cancer    Diabetes Paternal Grandmother     Colon polyps Paternal Grandfather     Colon cancer Paternal Grandfather     Cancer Paternal Grandfather         colon cancer at age 62      Social History:   Social History     Tobacco Use    Smoking status: Former     Current packs/day: 0.00     Average packs/day: 1 pack/day for 22.7 years (22.7 ttl pk-yrs)     Types: Cigarettes, Cigars     Start date: 1972     Quit date:      Years since quittin.2     Passive exposure: Never    Smokeless tobacco: Never    Tobacco comments:     smoking was off and on.  cumulative 7 years.  never more than three years in one stretch or more lj   Substance Use Topics    Alcohol use: Not Currently      I have reviewed and updated the patient's past medical, surgical, family and social histories.    Allergies:   Review of patient's allergies indicates:  No Known Allergies     Medications:   Current Outpatient Medications   Medication Sig Dispense Refill    allopurinoL (ZYLOPRIM) 100 MG tablet TAKE 1 TABLET BY MOUTH EVERY DAY 30 tablet 10    blood sugar diagnostic (ACCU-CHEK ANTONELLA PLUS TEST STRP) Strp Use to test CBG four times daily E11.65 400 strip 1    blood-glucose meter kit Checks blood sugars 1x/daily. 1 each 12    hydroCHLOROthiazide (HYDRODIURIL) 25 MG tablet Take 1 tablet (25 mg total) by mouth once daily. 30 tablet 11    lancets (ACCU-CHEK SOFTCLIX LANCETS) Misc Use to test CBG 4 times daily E11.65 400 each 1    metoprolol succinate (TOPROL-XL) 25 MG 24 hr tablet Take 0.5 tablets (12.5 mg total) by mouth once daily. 15 tablet 11    nitroGLYCERIN (NITROSTAT) 0.4 MG SL tablet Place 1 tablet (0.4 mg total) under the tongue every 5 (five) minutes as needed for Chest pain. If you need a third tablet, call 911 60 tablet 12    omeprazole (PRILOSEC) 40 MG capsule Take 1 capsule (40 mg total) by mouth once daily. 90 capsule 3    rivaroxaban (XARELTO) 20 mg Tab  Take 1 tablet (20 mg total) by mouth daily with dinner or evening meal. 90 tablet 3    rosuvastatin (CRESTOR) 20 MG tablet TAKE 1 TABLET(20 MG) BY MOUTH EVERY DAY (Patient taking differently: Take 20 mg by mouth every evening.) 30 tablet 11    tamsulosin (FLOMAX) 0.4 mg Cap Take 1 capsule (0.4 mg total) by mouth once daily. (Patient taking differently: Take 0.4 mg by mouth nightly.) 30 capsule 11    testosterone (ANDROGEL) 20.25 mg/1.25 gram (1.62 %) GlPm Apply 4 pumps to shoulders daily 2 each 5    triamcinolone acetonide 0.1% (KENALOG) 0.1 % cream Apply topically 2 (two) times daily. 45 g 1     No current facility-administered medications for this visit.      Physical Exam:   /61 (BP Location: Left arm, Patient Position: Sitting, BP Method: Large (Automatic))   Pulse (!) 53   Temp 98.1 °F (36.7 °C) (Oral)   Resp 18   Ht 6' (1.829 m)   Wt 85.7 kg (189 lb 0.7 oz)   SpO2 98%   BMI 25.64 kg/m²      ECOG Performance status: 0    Physical Exam  Vitals reviewed.   Constitutional:       General: He is not in acute distress.     Appearance: Normal appearance. He is not ill-appearing, toxic-appearing or diaphoretic.   HENT:      Head: Normocephalic and atraumatic.      Right Ear: External ear normal.      Left Ear: External ear normal.      Nose: Nose normal. No congestion.      Mouth/Throat:      Pharynx: Oropharynx is clear.   Eyes:      General: No scleral icterus.     Extraocular Movements: Extraocular movements intact.      Conjunctiva/sclera: Conjunctivae normal.      Pupils: Pupils are equal, round, and reactive to light.   Neck:      Comments: L neck wound well-healed  Cardiovascular:      Rate and Rhythm: Normal rate and regular rhythm.      Heart sounds: Normal heart sounds. No murmur heard.  Pulmonary:      Effort: Pulmonary effort is normal. No respiratory distress.      Breath sounds: Normal breath sounds. No wheezing or rales.   Abdominal:      General: Bowel sounds are normal. There is no  distension.      Palpations: Abdomen is soft.      Tenderness: There is no abdominal tenderness.   Musculoskeletal:         General: No swelling.      Cervical back: Normal range of motion.   Lymphadenopathy:      Cervical: No cervical adenopathy.   Skin:     Coloration: Skin is not jaundiced.      Findings: Rash (grade I rash to upper chest/back.) present. No bruising or erythema.   Neurological:      General: No focal deficit present.      Mental Status: He is alert and oriented to person, place, and time. Mental status is at baseline.      Cranial Nerves: No cranial nerve deficit.      Motor: No weakness.      Gait: Gait normal.   Psychiatric:         Mood and Affect: Mood normal.         Behavior: Behavior normal.         Thought Content: Thought content normal.         Judgment: Judgment normal.         Labs:   Recent Results (from the past 48 hour(s))   COMPREHENSIVE METABOLIC PANEL    Collection Time: 08/21/23  8:20 AM   Result Value Ref Range    Sodium 142 136 - 145 mmol/L    Potassium 3.9 3.5 - 5.1 mmol/L    Chloride 104 95 - 110 mmol/L    CO2 30 (H) 23 - 29 mmol/L    Glucose 152 (H) 70 - 110 mg/dL    BUN 16 8 - 23 mg/dL    Creatinine 1.1 0.5 - 1.4 mg/dL    Calcium 9.5 8.7 - 10.5 mg/dL    Total Protein 7.0 6.0 - 8.4 g/dL    Albumin 4.0 3.5 - 5.2 g/dL    Total Bilirubin 0.6 0.1 - 1.0 mg/dL    Alkaline Phosphatase 104 55 - 135 U/L    AST 27 10 - 40 U/L    ALT 26 10 - 44 U/L    eGFR >60.0 >60 mL/min/1.73 m^2    Anion Gap 8 8 - 16 mmol/L   Magnesium    Collection Time: 08/21/23  8:20 AM   Result Value Ref Range    Magnesium 1.8 1.6 - 2.6 mg/dL   PHOSPHORUS    Collection Time: 08/21/23  8:20 AM   Result Value Ref Range    Phosphorus 3.4 2.7 - 4.5 mg/dL   BILIRUBIN, DIRECT    Collection Time: 08/21/23  8:20 AM   Result Value Ref Range    Bilirubin, Direct 0.3 0.1 - 0.3 mg/dL   CBC W/ AUTO DIFFERENTIAL    Collection Time: 08/21/23  8:20 AM   Result Value Ref Range    WBC 4.96 3.90 - 12.70 K/uL    RBC 4.65 4.60 -  6.20 M/uL    Hemoglobin 13.0 (L) 14.0 - 18.0 g/dL    Hematocrit 41.6 40.0 - 54.0 %    MCV 90 82 - 98 fL    MCH 28.0 27.0 - 31.0 pg    MCHC 31.3 (L) 32.0 - 36.0 g/dL    RDW 14.8 (H) 11.5 - 14.5 %    Platelets 150 150 - 450 K/uL    MPV 11.0 9.2 - 12.9 fL    Immature Granulocytes 0.2 0.0 - 0.5 %    Gran # (ANC) 3.8 1.8 - 7.7 K/uL    Immature Grans (Abs) 0.01 0.00 - 0.04 K/uL    Lymph # 0.5 (L) 1.0 - 4.8 K/uL    Mono # 0.4 0.3 - 1.0 K/uL    Eos # 0.1 0.0 - 0.5 K/uL    Baso # 0.02 0.00 - 0.20 K/uL    nRBC 0 0 /100 WBC    Gran % 77.2 (H) 38.0 - 73.0 %    Lymph % 10.7 (L) 18.0 - 48.0 %    Mono % 8.9 4.0 - 15.0 %    Eosinophil % 2.6 0.0 - 8.0 %    Basophil % 0.4 0.0 - 1.9 %    Differential Method Automated          I have reviewed the pertinent labs from 23 which are notable for mild anemia.  Cr 1.1, normal LFTs.    Imagin2022 - EUS  Impression:             - Esophageal mucosal changes consistent with Paredes's esophagus.   - Partially obstructing, malignant esophageal tumor was found in the lower third of the esophagus.   - Normal stomach.   - Normal examined duodenum.   - A mass was found in the lower third of the esophagus. A tissue diagnosis was obtained prior to this exam. This is of adenocarcinoma. This was staged T3 (based on invasion into) N0 Mx by endosonographic criteria.   - No specimens collected.     3/1/22 - PET CT   Impression:     1.  Decreased uptake in persistent distal esophageal thickening suggestive of partial response to therapy.  Previous non hypermetabolic gastrohepatic and perigastric lymph nodes are stable to slightly decreased in size.     2.  No new FDG avid lesions to suggest mixed response to therapy.     3.  Incidental new hypermetabolic gluteal cutaneous thickening, likely benign.  Recommend correlation with history and physical examination.    Path:   3/14/23:  Final Pathologic Diagnosis 1. LYMPH NODE, LEVEL 7, EXCISION:   One benign lymph node (0/1)   2. TOTAL THORACIC  ESOPHAGECTOMY AND PROXIMAL STOMACH:   Adenocarcinoma, moderately differentiated, 3.0 cm   Margins are negative   Tumor invades adventitia   Extensive residual cancer with no evident tumor regression (poor or no   response, score 3)   Eleven benign lymph nodes (0/11)   3. RESIDUAL CONDUIT, EXCISION:   Negative for malignancy   SYNOPTIC REPORT   Procedure - Esophageal gastrectomy   Tumor site - GE Junction   Relationship of tumor to esophagogastric junction - Lesion is central at GE   junction   Tumor size - 3.0 x 3.1cm   Histologic type - Adenocarcinoma   Histologic grade - Moderately differentiated   Microscopic tumor extension - Tumor invades adventitia   Margins - All margins of resection are uninvolved, proximal, distal and   adventitial margins are negative   Treatment effect - Extensive residual cancer with no evident tumor regression   (poor or no response, score 3)   Lymphovascular invasion - Not identified   Pathologic staging - yp T3 N0 Mx   Lymph nodes examined - 12   Lymph nodes involved - 0   Additional pathologic findings - Intestinal metaplasia present   MMR-IHC has been performed on previous biopsy ([id number]) and shows all 4   antibodies intact          Assessment:       1. Esophageal adenocarcinoma    2. Vocal cord paralysis    3. Hypertension associated with diabetes    4. Hyperlipidemia associated with type 2 diabetes mellitus    5. Type 2 diabetes mellitus with stage 3 chronic kidney disease, without long-term current use of insulin, unspecified whether stage 3a or 3b CKD    6. Dysphagia, unspecified type    7. Coronary artery disease involving native coronary artery of native heart without angina pectoris    8. Paroxysmal atrial fibrillation    9. HFrEF (heart failure with reduced ejection fraction)    10. Dental infection    11. Rash        Plan:     # Esophageal adenocarcinoma   Mr. Person is a pleasant 67 year old male who presents to our clinic for management of esophageal cancer. He  initially presented with dysphagia, and further workup confirmed a stage II adenocarcinoma, pMMR. Tumor staged T3N0Mx by endosonographic criteria, JEVON. CT CAP on 12/14/22 confirmed no evidence of metastatic disease.    Previously conversation regarding his diagnosis and treatment options.  Recommended perioperative chemo/radiation per the CROSS trial. Discussed chemoradiation for ~5 weeks with 5 doses of weekly carboplatin and taxol administered. Plan to obtain restaging scans 4-5 weeks after radiation completed.     He was a candidate for a cooperative group trial assessing perioperative immunotherapy treatment. He consented for this study - ECOG-ACRIN [id number]: A Phase II/III Study of Dilcia-operative Nivolumab and Ipilimumab in Patients with Locoregional Esophageal and Gastroesophageal Junction Adenocarcinoma    Previously met with Dr. Santana Brown who agreed he was a surgical candidate.  Previously met with Dr. Javier Moulton who will be treating him with radiation.    Began cycle 1 carboplatin/paclitaxel/nivolumab on trial on 12/29/22.  Received cycle 4 of weekly chemotherapy on trial on 1/20/23.   We held chemotherapy for week 5 because of thrombocytopenia per protocol.    Completed radiation on 2/1/23.    Restaging PET/CT personally reviewed on 3/1/23 shows interval decreased FDG avidity in esophageal tumor, no new sites of disease.  CT CAP 3/2/23 shows stable esophageal thickening.    Underwent robotic esophagectomy on 3/14/23 with Dr. Brown.  Pathology showed negative margins, ypT3 N0 tumor with 0 of 12 lymph nodes involved.  No treatment effect was noted on the tumor tissue.    Discussed that per the JB2500 protocol will proceed with randomization to adjuvant nivolumab +/- ipilimumab.  Patient randomized to receive adjuvant Nivolumab, started cycle 1 at 480 mg q4 weeks 5/1/23.  Plan for twelve months per protocol.    Tolerated very well.  Grade 1 pruritis.    Proceed with cycle 5 today at 480 mg q4  weeks.  Timeout occurred with myself and Maria Esther SORTO. Orders signed.    Underwent dilation with Dr. Brown on 5/31/23 with temporary improvement in dysphagia.  Weight has decreased since last visit. Scheduled with CARMELA Morales on 7/31. Encouraged to increase caloric intake as tolerated.   PD-L1 CPS 30.    RTC in 4 weeks.    # Vocal cord paralysis  Post-op. S/p injection by ENT.  Improving.  Has follow-up with ENT.    # HTN, HLD, DM, CKD  Following with PCP Dr. Wilson and nephrologist Dr. Oconnell.   BP controlled in clinic today. Has been off his HCTZ/lisinopril because of prior hypotension.  Cr stable.    # CAD, A fib, CHF  Following with cardiologist Dr. Nick & EP Dr. Phillip.   Hold Xarelto temporarily for EGD. Plans to switch to coumadin.   Continue medical management.     # Dental Infection  Continues with several procedures.  No active infection currently.  Should not interfere with nivolumab treatment.  Monitor.     # Rash  Grade I, very mild. Likely 2/2 immunotherapy.   Continue triamcinolone.  Continue to monitor.     Follow up: 4 weeks per research.    Patient is in agreement with the proposed treatment plan. All questions were answered to the patient's satisfaction. Pt knows to call clinic if anything is needed before the next clinic visit.    Patient discussed with collaborating physician, Dr. Medrano.    At least 40 minutes were spent today on this encounter including face to face time with the patient, data gathering/interpretation and documentation.       Bernadette Patterson, MSN, APRN, ACCNS-AG  Hematology and Medical Oncology  Clinical Nurse Specialist to Dr. Medrano, Dr. Smith & Dr. David    Route Chart for Scheduling  Med Onc Route Chart for SchedulingTreatment Plan Information   [facility] [id number] ARM C ADJUVANT NIVOLUMAB STEP 2   Miki Medrano MD   Upcoming Treatment Dates - [facility] [id number] ARM C ADJUVANT NIVOLUMAB STEP 2    8/21/2023       Chemotherapy       INV nivolumab 480 mg in INV sodium chloride 0.9 %  100 mL chemo infusion  9/18/2023       Chemotherapy       INV nivolumab 480 mg in INV sodium chloride 0.9 % 100 mL chemo infusion  10/16/2023       Chemotherapy       INV nivolumab 480 mg in INV sodium chloride 0.9 % 100 mL chemo infusion  11/13/2023       Chemotherapy       INV nivolumab 480 mg in INV sodium chloride 0.9 % 100 mL chemo infusion    Supportive Plan Information  IV FLUIDS AND ELECTROLYTES   Miki Medrano MD   Upcoming Treatment Dates - IV FLUIDS AND ELECTROLYTES    No upcoming days in selected categories.    Therapy Plan Information  Flushes  heparin, porcine (PF) 100 unit/mL injection flush 500 Units  500 Units, Intravenous, Every visit  sodium chloride 0.9% flush 10 mL  10 mL, Intravenous, Every visit

## 2023-08-21 NOTE — PROGRESS NOTES
: URIEL Manriquez MD  Treating Investigators: NIRAV Medrano MD  Surgical Oncologist: SARAH Brown M.D. 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 Patient queried & verbalized his consent for continued participation in above-mentioned trial. Patient queried and continues to report dysphagia (improving), dysgeusia, hoarseness (improving), fatigue, intermittent nausea, and pruritus in his chest & upper back and the triamcinolone cream helps. Patient denies any other new or changed ConMeds.    Patient completed safety labs, VS, PE & ECOG (ECOG = 0, per Dr. Medrano) today per protocol. Dr. Medrano assessed all patient's labs, VS & PE findings as either WNL or NCS, deeming patient eligible to continue treatment with Nivolumab monotherapy. Due to scheduling restrictions, patient's infusion is scheduled for tomorrow at 1300.      Weight:  Step 1 Week 1 was 117.2 kg  Step 2 C1D1 was 99.2 kg   ( +/- 10% = 89.3 - 109.2 kg).   Today's weight is 85.7 kg   >20% weight loss since Step 1 Week 1 --> >10% change from Step 2 Cycle 1.      Per protocol, Nivolumab is administered at a 480 mg flat dose & cannot be modified. MACARIO & Dr. Medrano performed time-out in exam room.     Patient was encouraged to contact MACARIO or Dr. Medrano's team with any questions or concerns & was reminded of clinic's 24/7 emergency contact number. Patient verbalized understanding & denies any additional questions at this time.     Step 2 -- C5D1 Infusion:  [id number] @ 1300 - Nivo infusion      Step 2  -- C6D1 - [id number]:  labs [facility] 3rd floor @ 1030  [facility] 2nd floor @ 1130  infusion (Nivolumab only) @ 1300        Solicited AEs -- Assessed 21Aug2023:  ** Anemia - Grade 1 -- (NCS per MD) -- continued from prior visit  Platelet count decreased - Grade 0   White blood cell count decreased - Grade 0   Neutrophil count decreased - Grade 0  ** Lymphocyte count decreased - Grade 2   (NCS per MD)    6/26/2023 = Grade 3 -- NCS per MD   7/24/2023 = Grade 2 -- NCS per MD  Peripheral motor neuropathy - Grade 0   Peripheral sensory neuropathy - Grade 0   Creatinine increased - Grade 0  Hypothyroidism - Grade 0   (TSH WNL on 24Jul2023)  Diarrhea (patient baseline BM = 2 stools a day) - Grade 0 -- reports loose stools vs. diarrhea   **Fatigue - Grade 2   **Nausea - Grade 1  **Cough - Grade 1  -- Medical History  Pneumonitis - Grade 0  Hyperglycemia - Grade 0 -- Medical History 2/2 DMT2    Adrenal Insufficiency - Grade 0  **Rash-maculo-papular - Grade 1 - 17Jul2023 - ongoing ( used Sarna [camphor 0.5% - menthol 0.5&] PRN -- Prescribed triamcinolone 24Jul2023 ) -- (NCS per MD)  **Pruritis - Grade 1 - 17Jul2023 -- (NCS per MD)  Erythema multiforme - Grade 0  Vomiting - Grade 0    Lipase increased -- Not required per protocol and therefore not assessed this visit.      Ongoing Non-Solicited AEs:  Anorexia - Grade 1 - 09Jan2023 - ongoing (no treatment)  Dysgeusia - Grade 1 - [id number] - ongoing ( no treatment )  Generalized weakness - Grade 1 - [id number] - ongoing ( no treatment )  Nausea - Grade 1 - [id number] - ongoing ( no treatment )  Hoarseness - Grade 2 - [id number] - ongoing ( no treatment )  Dysphagia - Grade 1 - [id number] - ongoing ( no treatment )  Weight loss - Grade 2 - [id number] - ongoing ( no treatment )    New or Changed AEs Since Last Visit:  None           Complete Baseline Medical History, Adverse Events, and Concomitant Medications are located in patient's shadow chart

## 2023-08-22 ENCOUNTER — PATIENT MESSAGE (OUTPATIENT)
Dept: ADMINISTRATIVE | Facility: OTHER | Age: 68
End: 2023-08-22
Payer: MEDICARE

## 2023-08-22 ENCOUNTER — INFUSION (OUTPATIENT)
Dept: INFUSION THERAPY | Facility: HOSPITAL | Age: 68
End: 2023-08-22
Payer: MEDICARE

## 2023-08-22 ENCOUNTER — CLINICAL SUPPORT (OUTPATIENT)
Dept: REHABILITATION | Facility: HOSPITAL | Age: 68
End: 2023-08-22
Payer: MEDICARE

## 2023-08-22 VITALS
WEIGHT: 189.06 LBS | HEART RATE: 50 BPM | TEMPERATURE: 99 F | OXYGEN SATURATION: 98 % | RESPIRATION RATE: 18 BRPM | BODY MASS INDEX: 25.61 KG/M2 | SYSTOLIC BLOOD PRESSURE: 132 MMHG | HEIGHT: 72 IN | DIASTOLIC BLOOD PRESSURE: 60 MMHG

## 2023-08-22 DIAGNOSIS — C15.9 ESOPHAGEAL ADENOCARCINOMA: Primary | ICD-10-CM

## 2023-08-22 DIAGNOSIS — R29.3 POOR POSTURE: ICD-10-CM

## 2023-08-22 DIAGNOSIS — C15.9 ESOPHAGEAL ADENOCARCINOMA: ICD-10-CM

## 2023-08-22 DIAGNOSIS — R53.81 PHYSICAL DECONDITIONING: Primary | ICD-10-CM

## 2023-08-22 DIAGNOSIS — G89.29 CHRONIC LOW BACK PAIN: ICD-10-CM

## 2023-08-22 DIAGNOSIS — F43.29 STRESS AND ADJUSTMENT REACTION: ICD-10-CM

## 2023-08-22 DIAGNOSIS — M54.50 CHRONIC LOW BACK PAIN: ICD-10-CM

## 2023-08-22 PROCEDURE — 25000003 PHARM REV CODE 250: Mod: Q1 | Performed by: INTERNAL MEDICINE

## 2023-08-22 PROCEDURE — 97165 OT EVAL LOW COMPLEX 30 MIN: CPT

## 2023-08-22 PROCEDURE — A4216 STERILE WATER/SALINE, 10 ML: HCPCS | Mod: Q1 | Performed by: INTERNAL MEDICINE

## 2023-08-22 PROCEDURE — 97110 THERAPEUTIC EXERCISES: CPT

## 2023-08-22 PROCEDURE — 96413 CHEMO IV INFUSION 1 HR: CPT | Mod: Q1

## 2023-08-22 PROCEDURE — 97535 SELF CARE MNGMENT TRAINING: CPT

## 2023-08-22 PROCEDURE — 63600175 PHARM REV CODE 636 W HCPCS: Performed by: INTERNAL MEDICINE

## 2023-08-22 RX ORDER — SODIUM CHLORIDE 0.9 % (FLUSH) 0.9 %
10 SYRINGE (ML) INJECTION
Status: DISCONTINUED | OUTPATIENT
Start: 2023-08-22 | End: 2023-08-22 | Stop reason: HOSPADM

## 2023-08-22 RX ORDER — HEPARIN 100 UNIT/ML
500 SYRINGE INTRAVENOUS
Status: DISCONTINUED | OUTPATIENT
Start: 2023-08-22 | End: 2023-08-22 | Stop reason: HOSPADM

## 2023-08-22 RX ORDER — DIPHENHYDRAMINE HYDROCHLORIDE 50 MG/ML
50 INJECTION INTRAMUSCULAR; INTRAVENOUS ONCE AS NEEDED
Status: DISCONTINUED | OUTPATIENT
Start: 2023-08-22 | End: 2023-08-22 | Stop reason: HOSPADM

## 2023-08-22 RX ORDER — EPINEPHRINE 0.3 MG/.3ML
0.3 INJECTION SUBCUTANEOUS ONCE AS NEEDED
Status: DISCONTINUED | OUTPATIENT
Start: 2023-08-22 | End: 2023-08-22 | Stop reason: HOSPADM

## 2023-08-22 RX ADMIN — Medication 10 ML: at 03:08

## 2023-08-22 RX ADMIN — SODIUM CHLORIDE: 9 INJECTION, SOLUTION INTRAVENOUS at 01:08

## 2023-08-22 RX ADMIN — HEPARIN 500 UNITS: 100 SYRINGE at 03:08

## 2023-08-22 NOTE — PLAN OF CARE
Pt tolerated chemo well. No adverse reaction noted. Pt education reinforced on chemo regimen, side effects, what to expect, and when to call Dr. Medrano. Pt verbalized understanding. I reviewed pt calendar w/ pt and understanding verbalized.

## 2023-08-22 NOTE — PLAN OF CARE
[facility]   Occupational Therapy Initial Evaluation - Therapeutic Yoga    Date: 8/22/2023   Name: Lukasz Person  Clinic Number: [id number]    Therapy Diagnosis:   Encounter Diagnoses   Name Primary?    Chronic low back pain     Poor posture     Esophageal adenocarcinoma      Physician: Betina Valdovinos, *    Physician Orders: Eval and Treat   Medical Diagnosis from Referral: Chronic low back pain [M54.50, G89.29], Poor posture [R29.3], Esophageal adenocarcinoma [C15.9]  Evaluation Date: 8/22/2023  Authorization Period Expiration: 12/31/23  Plan of Care Expiration: 01/22/23  Progress Note Due: 10/22/23  Visit # / Visits authorized: 1/ 1     Precautions: Standard and cancer     Time In: 9:45 am  Time Out: 10:45  Total Appointment Time (codes): 60 minutes      SUBJECTIVE     Date of onset: 12/8/22    History of current condition: Lukasz reports: He was diagnosed with Esophageal adenocarcinoma, stage 2.  Completed chemo therapy and radiation.  surgical esophagectomy 3/17/23  Comorbidities: currently in immunotherapy 5/12 sessions.  He has become deconditioned and reports poor balance an decreased functional activity.    Falls: one fall about 6 weeks ago during a fast turn.    Prior Therapy: for both shoulders with improvement noted.  Social History:  lives with their spouse  Occupation: retired as  of one [employer].  Moved to [place] about 4 years ago after retiring.    Prior Level of Function: Pt. Had recently retired and was very active in family and community tasks. He reports no functional limitations prior to onset of cancer.  Current Level of Function: Decreased strength and endurance for activity ai home and in the community. He has difficulty with stairs and lifting/carrying, grocery shopping, laundry.  He reports poor balance and a recent fall.  His fear of falling keeps him from some activities.  Self-Care: sDifficuoty with shopping, laundry, home management.   He is ind. For dressing and bathing.    Pain:  Current 1/10, worst 4/10, best 0/10   Location: bilateral throat    Description: Grabbing and Tight  Aggravating Factors: Coughing/Sneezing and Eating      Patients goals: Be able to use stairs with more ease.  Be able to spend 4 hours up and active in the community.  Improved balance so I Feel safe out walking.  Carry a basket of laundry.     Medical History:   Past Medical History:   Diagnosis Date    Anticoagulant long-term use     Diabetes mellitus     Onset late 50s/early 60s    Hyperlipidemia     Hypertension     Onset late 50s/early 60s    Kidney stones     Sleep apnea     since 2006       Surgical History:   Lukasz Person  has a past surgical history that includes Lithotripsy; Parathyroidectomy (1/1/2-107); Left heart catheterization (Left, 9/30/2020); Coronary angiography (N/A, 9/30/2020); Cystoscopy (N/A, 2/17/2022); Ureteroscopic removal of ureteric calculus (Left, 2/25/2022); Laser lithotripsy (Left, 2/25/2022); Cystoscopy w/ ureteral stent placement (N/A, 2/25/2022); Pyeloscopy (Left, 2/25/2022); Ureteroscopy (Left, 2/25/2022); Treatment of cardiac arrhythmia (N/A, 4/7/2022); Transesophageal echocardiography (N/A, 4/7/2022); Esophagogastroduodenoscopy (N/A, 11/17/2022); Endoscopic ultrasound of upper gastrointestinal tract (N/A, 12/7/2022); Robot-assisted surgical removal of esophagus using da Pool Xi (N/A, 3/14/2023); robotic jejunostomy (N/A, 3/14/2023); Vocal cord injection (Left, 3/17/2023); and Esophagogastroduodenoscopy (N/A, 5/31/2023).    Medications:   Lukasz has a current medication list which includes the following prescription(s): allopurinol, blood sugar diagnostic, blood-glucose meter, hydrochlorothiazide, lancets, metoprolol succinate, nitroglycerin, omeprazole, rivaroxaban, rosuvastatin, tamsulosin, testosterone, and triamcinolone acetonide 0.1%.    Allergies:   Review of patient's allergies indicates:  No Known Allergies       OBJECTIVE      Patient Specific Functional Scale:         Activity 8/22/23        Poor balance 5       2.  Carrying 20 pounds or more 7       3.  Challenge with stairs 5       4.  Endurance  4       5.          6.        SCORE 5.25         Total Score = Sum of activity scores / number of activities  Minimum Detectable Change (90% CII) for average score = 2 points  Minimum Detectable Change (90% CI) for single activity score = 3 points    Lifestyle Questionnaire:      Lifestyle Response   Emotional Response to Health Status good   Patient's Communication Skills good   Reported Eating Habits good   Reported Sleeping Patterns good   Reported Energy Level fair   Knowledge of Exercises & Fitness fair   Frequency of Exercise Sessions/Week Walks 25 minutes 4-5x/week.       TREATMENT     Total Treatment time (time-based codes) separate from Evaluation: 30 minutes      Lukasz received the treatments listed below:      therapeutic exercises to develop strength, endurance, flexibility, and core stabilization for 15 minutes including:  Standing and sitting yoga exercise.  Self care to develop relaxation skills for immune health for 15 minutes including:Diaphragmatic breathing, body scan      PATIENT EDUCATION AND HOME EXERCISES     Education provided:   - - physiology of yoga/meditation and immune health     Written Home Exercises Provided: yes. Exercises were reviewed and Lukasz was able to demonstrate them prior to the end of the session.  Lukasz demonstrated good  understanding of the education provided. See EMR under Patient Instructions for exercises provided during therapy sessions.    ASSESSMENT     Lukasz is a 68 y.o. male referred to outpatient Occupational Therapy with a medical diagnosis of   . Patient presents with the following impairments:      Self-Care Limitations, Deconditioning, and Poor Stress Coping Skills    Following medical record review, it is determined that Lukasz will benefit from skilled outpatient  Occupational Therapy to address the impairments stated above and in the chart below in order to maximize functional activities. The following goals were discussed with the patient and patient is in agreement with them as addressed in the treatment plan. The patient's rehab potential is Good. Plan of care discussed with patient: Yes  Patient's spiritual, cultural and educational needs considered and patient is agreeable to the plan of care and goals as stated below:     Anticipated Barriers for therapy: none    Medical Necessity is demonstrated by the following    Profile and History Assessment of Occupational Performance Level of Clinical Decision Making Complexity Score   Occupational Profile:   Lukasz Person is a 68 y.o. male who lives with their spouse and is retired. Lukasz has difficulty with  ADLs and IADLs as listed previously, which  Affecting hisdaily functional abilities.      Comorbidities:    has a past medical history of Anticoagulant long-term use, Diabetes mellitus, Hyperlipidemia, Hypertension, Kidney stones, and Sleep apnea.    Medical and Therapy History Review:   Brief               Performance Deficits    Physical:  Joint Mobility  Joint Stability  Muscle Power/Strength  Muscle Endurance  Postural Control    Cognitive:  No Deficits    Psychosocial:    No Deficits     Clinical Decision Making:  low    Assessment Process:  Problem-Focused Assessments    Modification/Need for Assistance:  Not Necessary    Intervention Selection:  Multiple Treatment Options       low  Based on PMHX, co morbidities , data from assessments and functional level of assistance required with task and clinical presentation directly impacting function.         Goals:  Short Term Goals: 6 weeks      Goal # Goal Status   1 Patient will demonstrate independence with diaphragmatic breathing in sit and supine. Progressing   2 Pt. will identify resource for audio body scan to assist with stress management/immune health    3  Patient will identify 2 new stress coping skills for stress management/immune health. Progressing   4 Patient will identify activity pacing problems.  Patient will then implement new plan for daily activity to increase endurance for ADL's. Progressing      Long Term Goals: 12 weeks      Goal # Goal Status   1 Patient will demonstrate independence with yoga Home Exercise Program to increase strength and endurance for ADL's. Progressing   2 Patient will demonstrate independence with relaxation techniques to manage stress for immune health. Progressing   3 Patient will verbalize good understanding of stress/immune health relationship.  Progressing   4               PLAN   Plan of care Certification: 8/22/2023 to 01/22/24.    Outpatient Occupational Therapy 1 times weekly for 12 weeks to include the following interventions: Neuromuscular Re-ed, Patient Education, Self Care, Therapeutic Activities, and Therapeutic Exercise.     Joanna Kimball, OT      I

## 2023-08-25 ENCOUNTER — OFFICE VISIT (OUTPATIENT)
Dept: HEMATOLOGY/ONCOLOGY | Facility: CLINIC | Age: 68
End: 2023-08-25
Payer: MEDICARE

## 2023-08-25 ENCOUNTER — CLINICAL SUPPORT (OUTPATIENT)
Dept: REHABILITATION | Facility: OTHER | Age: 68
End: 2023-08-25
Payer: MEDICARE

## 2023-08-25 VITALS
SYSTOLIC BLOOD PRESSURE: 120 MMHG | HEIGHT: 72 IN | WEIGHT: 191.13 LBS | HEART RATE: 55 BPM | DIASTOLIC BLOOD PRESSURE: 57 MMHG | BODY MASS INDEX: 25.89 KG/M2 | OXYGEN SATURATION: 96 %

## 2023-08-25 DIAGNOSIS — E11.59 OBESITY, DIABETES, AND HYPERTENSION SYNDROME: Primary | ICD-10-CM

## 2023-08-25 DIAGNOSIS — I15.2 OBESITY, DIABETES, AND HYPERTENSION SYNDROME: Primary | ICD-10-CM

## 2023-08-25 DIAGNOSIS — R53.81 PHYSICAL DECONDITIONING: Primary | ICD-10-CM

## 2023-08-25 DIAGNOSIS — E66.9 OBESITY, DIABETES, AND HYPERTENSION SYNDROME: Primary | ICD-10-CM

## 2023-08-25 DIAGNOSIS — E11.69 OBESITY, DIABETES, AND HYPERTENSION SYNDROME: Primary | ICD-10-CM

## 2023-08-25 DIAGNOSIS — E11.29 TYPE 2 DIABETES MELLITUS WITH OTHER DIABETIC KIDNEY COMPLICATION, WITHOUT LONG-TERM CURRENT USE OF INSULIN: Chronic | ICD-10-CM

## 2023-08-25 DIAGNOSIS — R26.89 IMBALANCE: ICD-10-CM

## 2023-08-25 DIAGNOSIS — C15.9 ESOPHAGEAL ADENOCARCINOMA: ICD-10-CM

## 2023-08-25 PROCEDURE — 99999 PR PBB SHADOW E&M-EST. PATIENT-LVL III: CPT | Mod: PBBFAC,,, | Performed by: OBSTETRICS & GYNECOLOGY

## 2023-08-25 PROCEDURE — 99214 PR OFFICE/OUTPT VISIT, EST, LEVL IV, 30-39 MIN: ICD-10-PCS | Mod: S$PBB,,, | Performed by: OBSTETRICS & GYNECOLOGY

## 2023-08-25 PROCEDURE — 99999 PR PBB SHADOW E&M-EST. PATIENT-LVL III: ICD-10-PCS | Mod: PBBFAC,,, | Performed by: OBSTETRICS & GYNECOLOGY

## 2023-08-25 PROCEDURE — 99213 OFFICE O/P EST LOW 20 MIN: CPT | Mod: PBBFAC | Performed by: OBSTETRICS & GYNECOLOGY

## 2023-08-25 PROCEDURE — 97110 THERAPEUTIC EXERCISES: CPT | Mod: PN | Performed by: PHYSICAL THERAPIST

## 2023-08-25 PROCEDURE — 99214 OFFICE O/P EST MOD 30 MIN: CPT | Mod: S$PBB,,, | Performed by: OBSTETRICS & GYNECOLOGY

## 2023-08-25 PROCEDURE — 97161 PT EVAL LOW COMPLEX 20 MIN: CPT | Mod: PN | Performed by: PHYSICAL THERAPIST

## 2023-08-25 NOTE — PROGRESS NOTES
[facility]   Physical Therapy Initial Evaluation      Name: Lukasz Person  Clinic Number: [id number]    Therapy Diagnosis:   Encounter Diagnoses   Name Primary?    Physical deconditioning Yes    Imbalance         Physician: Kirk Wilson MD    Physician Orders: PT Eval and Treat   Medical Diagnosis from Referral: Imbalance  Evaluation Date: 8/25/2023  Authorization Period Expiration: 07/19/2024  Plan of Care Expiration: 10/30/2023  Progress Note Due: 09/25/2023  Visit # / Visits authorized: 1/ 0       Precautions: Standard, Fall, and Immunosuppression     Time In: 1320  Time Out: 1410  Total Billable Time: 50 minutes    Subjective     Occupation:   Retired    SocHx:   Patient lives with their family  in a single family home .       Current Functional Status: Physical activity: yard work; and walking 20-30 mins for 5-6 days/week; started yoga yesterday; Infusion 1x/month (completed 5 of 12)  Bedroom:  full access   Bathroom:  full access    Pt has DME  as follows:owns a cane but not in use  Previous Level of Function:  Independent.  Pt noted that he did feel somewhat unsteady prior to CA Rx but since March noticed significant/noticeable changes in balance.    Lukasz Person's symptoms are gradual in onset.      Since onset:  improving   March 2023 Throat surgery and lost weight; noticeable balance issues since surgery. Loss of balance reflexes.   Type : balance    Description of symptoms : unsteadiness. Denies nausea or dizziness.  Falls: Yes 1 month ago and I was in the yard and turned quickly to go and foot caught edge of lawn and couldn't catch self; No injury with this fall  Near falls: back steps missed a step and went forward but caught self hit knee bit not injurious.   Patient Goals:  Improve balance and strength       Medical History:   Past Medical History:   Diagnosis Date    Anticoagulant long-term use     Diabetes mellitus     Onset late 50s/early 60s    Hyperlipidemia      Hypertension     Onset late 50s/early 60s    Kidney stones     Sleep apnea     since 2006       Surgical History:   Lukasz Person  has a past surgical history that includes Lithotripsy; Parathyroidectomy (1/1/2-107); Left heart catheterization (Left, 9/30/2020); Coronary angiography (N/A, 9/30/2020); Cystoscopy (N/A, 2/17/2022); Ureteroscopic removal of ureteric calculus (Left, 2/25/2022); Laser lithotripsy (Left, 2/25/2022); Cystoscopy w/ ureteral stent placement (N/A, 2/25/2022); Pyeloscopy (Left, 2/25/2022); Ureteroscopy (Left, 2/25/2022); Treatment of cardiac arrhythmia (N/A, 4/7/2022); Transesophageal echocardiography (N/A, 4/7/2022); Esophagogastroduodenoscopy (N/A, 11/17/2022); Endoscopic ultrasound of upper gastrointestinal tract (N/A, 12/7/2022); Robot-assisted surgical removal of esophagus using da Pool Xi (N/A, 3/14/2023); robotic jejunostomy (N/A, 3/14/2023); Vocal cord injection (Left, 3/17/2023); and Esophagogastroduodenoscopy (N/A, 5/31/2023).    Medications:   Lukasz has a current medication list which includes the following prescription(s): allopurinol, blood sugar diagnostic, blood-glucose meter, hydrochlorothiazide, lancets, metoprolol succinate, nitroglycerin, omeprazole, rivaroxaban, rosuvastatin, tamsulosin, testosterone, and triamcinolone acetonide 0.1%.    Allergies:   Review of patient's allergies indicates:  No Known Allergies     Objective      Review of patient's allergies indicates:  No Known Allergies    Mental status :alert  Posture Alignment :forward head  Sensation: Light Touch: Intact  Functional changes: bending over LOB; more unsteady  when tired. Getting through tight pathways make me feel unsteady. Imbalance has improved strength with time since surgery     ROM:  UPPER EXTREMITY--AROM/PROM  (R) UE: WFLs  (L) UE: WFLs           RANGE OF MOTION--LOWER EXTREMITIES   (R) LE: WFLs   (L) LE: WFLs    Strength  Right LE  Left LE    Knee extension: 4+/5 Knee extension: 4+/5   Knee  flexion: 4+/5 Knee flexion: 4+/5   Hip flexion: 4+/5 Hip flexion: 4+/5   Hip extension:  4+/5 Hip extension: 4+/5   Hip abduction: 4+/5 Hip abduction: 4+/5   Hip adduction: 4+/5 Hip adduction: 4+/5   Hip internal rotation: 4+/5 Hip internal rotation: 4+/5   Hip external rotation: 4+/5 Hip external rotation:   4+/5   Ankle dorsiflexion:   4+/5 Ankle dorsiflexion:   4+/5   Ankle plantarflexion: 4+/5 Ankle plantarflexion: 4+/5     Eye Head Coordination:  Smooth Pursuit: good motion quality and does not elicit symptoms  Saccadic eye movements:  target stays in focus  Convergence/divergence: Intact  Vestibular Ocular Reflex: Intact    Tracking with Head movements:    Head movements/object stationary: does not elicit symptoms    SENSORY ORGANIZATION (Greater than 30 seconds)   Firm Surface Eyes Closed:Yes  Firm Surface Eyes Open: Yes  Foam Surface Eyes Open: Yes  Foam Surface Eyes Closed: No  Tandem Romberg EO: Yes  Tandem Romberg EC: No  Single Limb Support EO: Yes  Pt is able to tolerate minimal perturbations: Yes     TU seconds    ACUÑA Assessment    1. Sitting to Standing   4 - able to stand without using hands and stabilize independently  2. Standing Unsupported   4 - able to stand safely 2 minutes without hold  3. Sitting Unsupported   4 - able to sit safely and securely 2 minutes  4. Standing to Sitting   4 - sits safely with minimal use of hands  5. Pivot Transfer   4 - able to trnasfer safely with minor use of hands  6. Standing with Eyes Closed   3 - able to stand 10 seconds with supervision  7. Standing with Feet Together   3 - able to place feet together independently and stand for 1 minute with supervision  8. Reaching Forward with Outstretched Arm   4 - can reach forward confidently 25 cm/10 inches  9. Retrieving Object from Floor   4 - able to  slipper safely and easily  10. Turning to Look Behind   3 - looks behind one side only, other side less weight shift  11. Turning 360 Degrees   4 - able  to turn 360 in 4 seconds or less  12. Placing Alternate Foot on Step   4 - able to stand independently safely and complete 8 steps in 20 seconds  13. Standing with One Foot in Front   3 - able to place foot ahead of other independently and hold 30 seconds  14. Standing on One Foot   4 - able to lift leg independently and hold > 10 seconds                             Total:    44        Maximum = 56    Gait on level surfaces:     Gait Deviations: Lukasz displays occasional unsteady gait.  Gait with changing speeds: gait quality declines wiht fast speeds  Gait with horizontal head turns: gait quality declines  Gait with vertical head turns: gait quality declines  Gait with pivot turns: path deviates from midline  Tandem ambulation: gait quality declines    Elevations:  Lukasz negotiated a 4 inch curb forward using no railings with an no assistive device with supervision.     PT Evaluation Completed?: Yes  Discussed plan of care with patient?: Yes      Treatment     Total Treatment time (time-based codes) separate from Evaluation: 10 minutes     Lukasz received the treatments listed below:      therapeutic exercises to develop strength, endurance, ROM, flexibility, posture, and core stabilization for 8 minutes including:  NuStep 10 minutes (instruction on set up) seat @9 and arms at 11    neuromuscular re-education activities to improve: Balance, Coordination, Kinesthetic, Sense, Proprioception, and Posture for 00 minutes. The following activities were included:    therapeutic activities to improve functional performance for 00  minutes, including:    gait training to improve functional mobility and safety for 00  minutes, including:    Patient Education and Home Exercises     Education provided:   - balance  -falls and safety    Written Home Exercises Provided:  no . Exercises were reviewed and Lukasz was able to demonstrate them prior to the end of the session.  Lukasz demonstrated good  understanding of the  education provided. See EMR under Patient Instructions for exercises provided during therapy sessions.    Assessment     This is a 68 y.o. male with a medical diagnosis of   Encounter Diagnoses   Name Primary?    Physical deconditioning Yes    Imbalance    Patient presents with impaired balance. Lukasz will benefit from skilled physical therapy intervention to address the above impairments and functional limitations. Pt would benefit from general strengthening and conditioning while including weightbearing balance activitites to improve safe functional mobility to meet the patient's goals for therapy.    Plan of care discussed with patient: Yes  Patient's spiritual, cultural and educational needs considered and patient is agreeable to the plan of care and goals as stated below:     Anticipated Barriers for therapy: none    Medical Necessity is demonstrated by the following  History  Co-morbidities and personal factors that may impact the plan of care [x] LOW: no personal factors / co-morbidities  [] MODERATE: 1-2 personal factors / co-morbidities  [] HIGH: 3+ personal factors / co-morbidities    Moderate / High Support Documentation:   Co-morbidities affecting plan of care:     Personal Factors:   no deficits     Examination  Body Structures and Functions, activity limitations and participation restrictions that may impact the plan of care [x] LOW: addressing 1-2 elements  [] MODERATE: 3+ elements  [] HIGH: 4+ elements (please support below)    Moderate / High Support Documentation:      Clinical Presentation  LOW: stable  [] MODERATE: Evolving  [] HIGH: Unstable     Decision Making/ Complexity Score: low         The following goals were discussed with the patient and patient is in agreement with them as to be addressed in the treatment plan.   Goals:  STG'S: 3 weeks  1. Patient independent in HEP of balance and head-eye coordination.  Exercises to be done Daily.  2. Patient able to perform forward bending without  LOB or c/o dizziness  3. Pt to improve Hughes by 4-6 points for improved safety with functional mobility    LTG'S : 6 weeks  1. Patient able to ambulate in community safely without assistive device, on varied terrainwith head movement, without LOB or c/o dizziness.  2. Pt to be I with self management of condition and progression vestibular program for maintenance.  3. Pt to improve Hughes by 6-10 points for improved safety with functional mobility  Plan     Plan of care Certification: 8/25/2023 to 10/30/2023.    PLAN:    Lukasz will be seen 2 times per weeks for the next 6 weeks for neuromuscular education, balance and coordinatoin training, therapeutic exercise, gait training, proprioception exercises, postural education, dynamic standing balance exercises, sensory organization activities, eye head coordination exercises , and and other therapeutic interventions as deemed necessary to address above goals.. Pt may be seen by a PTA as part of the Rehab Team.     Clarissa Renae, PT, DPT, MHA, CLT, CEAS

## 2023-08-25 NOTE — NURSING
RN Yeison conducted chart review for clinic preparations including intake, barriers to care and opportunities for greater evaluation/comprehensive recommendations by provider, Lisbet Bey RN.

## 2023-08-25 NOTE — PROGRESS NOTES
Integrative Health and Medicine Follow-Up Visit    Patient is a 67yo male returning for Integrative Oncology follow-up and recommendations. He is s/p cycle #5 of Nivolumab on 8/22/23 via [id number] protocol. Patient notes persistent pruritis, fatigue, neuropathy and dysphagia. Attended one acupuncture session and OT Yoga session thus far. He had not been exercing given fatigue and poor stamina.  with two children, 2 grandchildren.  He is trying to do more movement- walks 5-6 days per week  and does yard work.He would like to improve his muscle tone and strength  He has lost weight (was 263 pounds) and his diabetes control has improved.   He started Yoga classes   He has minimal neuropathy which he reports did not worsened with treat  Sleep apnea has improved since weight loss    Cancer/Stage/TNM:   Cancer Staging   Esophageal adenocarcinoma  Staging form: Esophagus - Adenocarcinoma, AJCC 8th Edition  - Pathologic stage from 3/28/2023: Stage II (ypT3, pN0, cM0, G2) - Signed by Santana Brown Jr., MD on 3/28/2023       Oncology History   Esophageal adenocarcinoma   12/8/2022 Initial Diagnosis    Esophageal adenocarcinoma     12/21/2022 - 12/21/2022 Chemotherapy    Treatment Summary   Plan Name: OP ESOPHAGEAL PACLITAXEL CARBOPLATIN WEEKLY  Treatment Goal: Curative  Status: Inactive  Start Date:   End Date:   Provider: Miki Medrano MD  Chemotherapy: CARBOplatin (PARAPLATIN) in sodium chloride 0.9% 250 mL chemo infusion, , Intravenous, Clinic/HOD 1 time, 0 of 1 cycle  PACLitaxeL (TAXOL) 50 mg/m2 = 120 mg in sodium chloride 0.9% 250 mL chemo infusion, 50 mg/m2, Intravenous, Clinic/HOD 1 time, 0 of 1 cycle     12/29/2022 - 1/20/2023 Chemotherapy    Treatment Summary   Plan Name: [facility] [id number] ARM B CARBOPLATIN PACLITAXEL NIVOLUMAB  Treatment Goal: Curative  Status: Inactive  Start Date: 12/29/2022  End Date: 1/20/2023  Provider: Miki Medrano MD  Chemotherapy: CARBOplatin (PARAPLATIN) 215 mg in sodium  chloride 0.9% 306.5 mL chemo infusion, 215 mg, Intravenous, Clinic/HOD 1 time, 4 of 5 cycles  Administration: 215 mg (12/29/2022), 230 mg (1/5/2023), 265 mg (1/13/2023), 265 mg (1/20/2023)  PACLitaxeL (TAXOL) 50 mg/m2 = 120 mg in sodium chloride 0.9% 250 mL chemo infusion, 50 mg/m2 = 120 mg, Intravenous, Clinic/HOD 1 time, 4 of 5 cycles  Dose modification: 50 mg/m2 (original dose 50 mg/m2, Cycle 4)  Administration: 120 mg (12/29/2022), 120 mg (1/5/2023), 120 mg (1/13/2023), 120 mg (1/20/2023)     12/29/2022 - 2/1/2023 Radiation Therapy    Treating physician: Dr. Javier Moulton    Course: C1 CHEST 2022    Treatment Site Ref. ID Energy Dose/Fx (Gy) #Fx Dose Correction (Gy) Total Dose (Gy) Start Date End Date Elapsed Days   IM Esophagus [id number] 6X 1.8 23 / 23 0 41.4 12/29/2022 2/1/2023 34        3/17/2023 Surgery    Procedure: Procedure(s) (LRB):  XI ROBOTIC ESOPHAGECTOMY (N/A)  ROBOTIC JEJUNOSTOMY TUBE INSERTION (N/A)      Surgeon(s) and Role:     * Santana Brown Jr., MD - Primary     * Darian Murphy MD - Assisting     Assistance: Due to having no qualified resident during critical portions of the case, Dr. Darian Murphy acted as an assistant.     Pre-Operative Diagnosis: Esophageal adenocarcinoma, distal 1/3     Post-Operative Diagnosis: Same     Pre-Operative Variables:  Stage: T3 N0  Adjacent organ involvement: None  Chemotherapy within 90 Days: Yes  Radiation Therapy within 90 Days: Yes     Comorbidities:  Morbid obesity  Type 2 diabetes mellitus  Obstructive sleep apnea  Gout  Hyperparathyroidism  Hypertension  Severe obesity  Coronary artery disease  Heart failure with reduced ejection fraction    Procedure:  Robotic-assisted Vj three field esophagectomy  Regional mediastinal lymph node dissection  Botox injection of the pylorus  Jejunostomy tube placement     Operative Findings:                No evidence of distant intraperitoneal metastases in the chest or abdomen  Esophageal tumor located in the  distal third of the esophagus, mild radiation changes appreciated.  Resection status:  R0 pending final pathology.  No gross disease remaining post dissection.  Frozen sections on proximal and distal margins negative for malignancy  100u Botox injection into the pylorus  Stapled esophagogastrostomy performed at the neck incision after confirmation of negative margins.  Jejunostomy tube placed      3/28/2023 Cancer Staged    Staging form: Esophagus - Adenocarcinoma, AJCC 8th Edition  - Pathologic stage from 3/28/2023: Stage II (ypT3, pN0, cM0, G2)     5/1/2023 - 5/1/2023 Chemotherapy    Treatment Summary   Plan Name: [facility] [id number] ARM C ADJUVANT NIVOLUMAB  Treatment Goal: Curative  Status: Inactive  Start Date: 5/1/2023  End Date: 5/1/2023  Provider: Miki Medrano MD  Chemotherapy: [No matching medication found in this treatment plan]     5/29/2023 -  Chemotherapy    Treatment Summary   Plan Name: [facility] [id number] ARM C ADJUVANT NIVOLUMAB STEP 2  Treatment Goal: Curative  Status: Active  Start Date: 5/29/2023  End Date: 4/29/2024 (Planned)  Provider: Miki Medrano MD  Chemotherapy: [No matching medication found in this treatment plan]           Past Medical History:   Diagnosis Date    Anticoagulant long-term use     Diabetes mellitus     Onset late 50s/early 60s    Hyperlipidemia     Hypertension     Onset late 50s/early 60s    Kidney stones     Sleep apnea     since 2006        Current Outpatient Medications   Medication Instructions    allopurinoL (ZYLOPRIM) 100 MG tablet TAKE 1 TABLET BY MOUTH EVERY DAY    blood sugar diagnostic (ACCU-CHEK ANTONELLA PLUS TEST STRP) Strp Use to test CBG four times daily E11.65    blood-glucose meter kit Checks blood sugars 1x/daily.    hydroCHLOROthiazide (HYDRODIURIL) 25 mg, Oral, Daily    lancets (ACCU-CHEK SOFTCLIX LANCETS) Misc Use to test CBG 4 times daily E11.65    metoprolol succinate (TOPROL-XL) 12.5 mg, Oral, Daily    nitroGLYCERIN (NITROSTAT) 0.4 mg, Sublingual, Every 5  min PRN, If you need a third tablet, call 911    omeprazole (PRILOSEC) 40 mg, Oral, Daily    rivaroxaban (XARELTO) 20 mg, Oral, With dinner    rosuvastatin (CRESTOR) 20 MG tablet TAKE 1 TABLET(20 MG) BY MOUTH EVERY DAY    tamsulosin (FLOMAX) 0.4 mg, Oral, Daily    testosterone (ANDROGEL) 20.25 mg/1.25 gram (1.62 %) GlPm Apply 4 pumps to shoulders daily    triamcinolone acetonide 0.1% (KENALOG) 0.1 % cream Topical (Top), 2 times daily        Past Surgical History:   Procedure Laterality Date    CORONARY ANGIOGRAPHY N/A 9/30/2020    Procedure: ANGIOGRAM, CORONARY ARTERY;  Surgeon: John West MD;  Location: [facility] CATH LAB;  Service: Cardiology;  Laterality: N/A;    CYSTOSCOPY N/A 2/17/2022    Procedure: CYSTOSCOPY;  Surgeon: Lukasz Hughes MD;  [facility];  Service: Urology;  Laterality: N/A;    CYSTOSCOPY W/ URETERAL STENT PLACEMENT N/A 2/25/2022    Procedure: CYSTOSCOPY, WITH URETERAL STENT INSERTION;  Surgeon: Lukasz Hughes MD;  [facility];  Service: Urology;  Laterality: N/A;    ENDOSCOPIC ULTRASOUND OF UPPER GASTROINTESTINAL TRACT N/A 12/7/2022    Procedure: ULTRASOUND, UPPER GI TRACT, ENDOSCOPIC;  Surgeon: Darian Main MD;  Location: [facility];  Service: Endoscopy;  Laterality: N/A;  Approval to hold Xarelto rec'd from Dr. Nick (see t/e 12/5/22)-DS    ESOPHAGOGASTRODUODENOSCOPY N/A 11/17/2022    Procedure: EGD (ESOPHAGOGASTRODUODENOSCOPY);  Surgeon: Brody Gonzales MD;  Location: [facility] (2ND FLR);  Service: Endoscopy;  Laterality: N/A;  inst via email-ok to hold Xarelto x 2 days-MS    ESOPHAGOGASTRODUODENOSCOPY N/A 5/31/2023    Procedure: EGD (ESOPHAGOGASTRODUODENOSCOPY) WITH DILATION;  Surgeon: Santana Brown Jr., MD;  Location: [facility] OR 2ND FLR;  Service: General;  Laterality: N/A;    LASER LITHOTRIPSY Left 2/25/2022    Procedure: LITHOTRIPSY, USING LASER;  Surgeon: Lukasz Hughes MD;  Location: [facility] OR [address] [place] [address];  Service: Urology;  Laterality: Left;     LEFT HEART CATHETERIZATION Left 9/30/2020    Procedure: Left heart cath;  Surgeon: John West MD;  Location: [facility] CATH LAB;  Service: Cardiology;  Laterality: Left;    LITHOTRIPSY      PARATHYROIDECTOMY  1/1/2-107    PYELOSCOPY Left 2/25/2022    Procedure: PYELOSCOPY;  Surgeon: Lukasz Hughes MD;  Location: [facility] OR [address] [place] [address];  Service: Urology;  Laterality: Left;    ROBOT-ASSISTED SURGICAL REMOVAL OF ESOPHAGUS USING DA CARLOS XI N/A 3/14/2023    Procedure: XI ROBOTIC ESOPHAGECTOMY;  Surgeon: Santana Brown Jr., MD;  Location: [facility] OR [address] [place] [address];  Service: General;  Laterality: N/A;  Abdomen, Chest and Neck    ROBOTIC JEJUNOSTOMY N/A 3/14/2023    Procedure: ROBOTIC JEJUNOSTOMY TUBE INSERTION;  Surgeon: Santana Brown Jr., MD;  Location: [facility] OR [address] [place] [address];  Service: General;  Laterality: N/A;    TRANSESOPHAGEAL ECHOCARDIOGRAPHY N/A 4/7/2022    Procedure: ECHOCARDIOGRAM, TRANSESOPHAGEAL;  Surgeon: Xander Diagnostic Provider;  Location: [facility] EP LAB;  Service: Cardiology;  Laterality: N/A;    TREATMENT OF CARDIAC ARRHYTHMIA N/A 4/7/2022    Procedure: Cardioversion or Defibrillation;  Surgeon: Iris Fisher NP;  Location: [facility] EP LAB;  Service: Cardiology;  Laterality: N/A;  afib, dccv, artie, anes, EH, 3prep    URETEROSCOPIC REMOVAL OF URETERIC CALCULUS Left 2/25/2022    Procedure: REMOVAL, CALCULUS, URETER, URETEROSCOPIC;  Surgeon: Lukasz Hughes MD;  Location: [address];  Service: Urology;  Laterality: Left;    URETEROSCOPY Left 2/25/2022    Procedure: URETEROSCOPY;  Surgeon: Lukasz Hughes MD;  [facility];  Service: Urology;  Laterality: Left;    VOCAL CORD INJECTION Left 3/17/2023    Procedure: INJECTION, VOCAL CORD, LARYNGOSCOPIC;  Surgeon: Gm Altman MD;  Location: [facility] OR [address] [place] [address];  Service: ENT;  Laterality: Left;      Distress Score: 0           Physical Exam   There were no vitals taken for this visit.   Wt Readings from Last 3 Encounters:   08/22/23 85.7 kg (189  lb 0.7 oz)   08/21/23 85.7 kg (189 lb 0.7 oz)   08/08/23 86.9 kg (191 lb 9.3 oz)     Temp Readings from Last 3 Encounters:   08/22/23 99 °F (37.2 °C)   08/21/23 98.1 °F (36.7 °C) (Oral)   07/25/23 98 °F (36.7 °C)     BP Readings from Last 3 Encounters:   08/22/23 132/60   08/21/23 130/61   07/25/23 135/64     Pulse Readings from Last 3 Encounters:   08/22/23 (!) 50   08/21/23 (!) 53   07/25/23 (!) 52       Body mass index is There is no height or weight on file to calculate BMI.    Vitals reviewed. BP (!) 120/57 (BP Location: Left arm, Patient Position: Sitting)   Pulse (!) 55   Ht 6' (1.829 m)   Wt 86.7 kg (191 lb 2.2 oz)   SpO2 96%   BMI 25.92 kg/m²     Constitutional:       General: Patient is not in acute distress.     Appearance: Normal appearance.       Psychiatric:         Mood and Affect: Mood normal.         Behavior: Behavior normal.         Thought Content: Thought content normal.         Judgment: Judgment normal.      Review of Systems:   Cardiac:           No SOB, chest pain with exertion,edema, orthopnea  Distress:          No excessive sadness, no hopelessness, no anhedonia, no excessive worry or nervousness  Cognitive:        No trouble with memory, no difficulty paying attention, no brain fog, no trouble functioning with work or home life  Fatigue:           Energy level adequate, performing ADL's, no morning fatigue                           Fatigue  1  / 10  ( Scale 0 - 10)   Hormonal:       No hot flashes, no night sweats  Pain:                Has no pain,  location:                          Pain 0   / 10 (Scale 0 - 10)    Neuropathy:    No numbness, no tingling, no paresthesia   Sleep:              No difficulty falling asleep, no waking up in night, no daytime sleepiness, no snoring  Altered function:          No problems with money management,  no problems with daily organization & planning  Weight:           No concerns with weight, wants to lose a little and maintain healthy         Labs:   Lab Results   Component Value Date    WBC 4.96 08/21/2023    HGB 13.0 (L) 08/21/2023    HCT 41.6 08/21/2023    MCV 90 08/21/2023     08/21/2023           Hemoglobin A1C   Date Value Ref Range Status   05/29/2023 6.2 (H) 4.0 - 5.6 % Final     Comment:     ADA Screening Guidelines:  5.7-6.4%  Consistent with prediabetes  >or=6.5%  Consistent with diabetes    High levels of fetal hemoglobin interfere with the HbA1C  assay. Heterozygous hemoglobin variants (HbS, HgC, etc)do  not significantly interfere with this assay.   However, presence of multiple variants may affect accuracy.     03/14/2023 6.6 (H) 4.0 - 5.6 % Final     Comment:     ADA Screening Guidelines:  5.7-6.4%  Consistent with prediabetes  >or=6.5%  Consistent with diabetes    High levels of fetal hemoglobin interfere with the HbA1C  assay. Heterozygous hemoglobin variants (HbS, HgC, etc)do  not significantly interfere with this assay.   However, presence of multiple variants may affect accuracy.     11/07/2022 6.9 (H) 4.0 - 5.6 % Final     Comment:     ADA Screening Guidelines:  5.7-6.4%  Consistent with prediabetes  >or=6.5%  Consistent with diabetes    High levels of fetal hemoglobin interfere with the HbA1C  assay. Heterozygous hemoglobin variants (HbS, HgC, etc)do  not significantly interfere with this assay.   However, presence of multiple variants may affect accuracy.        T3,T4,T7 and TSH   Vitamin d level  Vitamin b-12       Assessment:   Esophageal Cancer  Diabetes  Weight loss  Plan     Counseled him on benefit of PT to improve muscle strength and balance  HgbA1c - link to next labs  Continue OT/yoga  Patient has follow-up with nutrition    Total time with patient 25 minutes-face-to-face, review of medical record and arranging follow-up

## 2023-08-29 ENCOUNTER — TELEPHONE (OUTPATIENT)
Dept: PODIATRY | Facility: CLINIC | Age: 68
End: 2023-08-29
Payer: MEDICARE

## 2023-08-29 NOTE — TELEPHONE ENCOUNTER
Left message on 8/29/23 @ 8:59 am for pt to return call if he would like to reschedule 9/12 th appointment @ 10:00 am with Dr. Donohue instead of Dr. Lopez

## 2023-08-31 DIAGNOSIS — E11.65 UNCONTROLLED TYPE 2 DIABETES MELLITUS WITH HYPERGLYCEMIA: ICD-10-CM

## 2023-08-31 NOTE — TELEPHONE ENCOUNTER
----- Message from Karina Wolff sent at 8/31/2023 11:34 AM CDT -----  Contact: Pharmacy [phone]  Type: Rx Prior Authorization    Medication:blood sugar diagnostic (ACCU-CHEK ANTONELLA PLUS TEST STRP) [facility]    Pharmacy number:   [id number] - [place] - [address] [facility] [address]  [address] [address]  Phone: [phone] Fax: [phone]       Insurance Pharmacy Authorization Phone Number: Fax: [phone]    Are any other medications covered by the insurance?        Comments: Patient is leaving Saturday morning

## 2023-09-01 ENCOUNTER — PATIENT MESSAGE (OUTPATIENT)
Dept: INTERNAL MEDICINE | Facility: CLINIC | Age: 68
End: 2023-09-01
Payer: MEDICARE

## 2023-09-01 RX ORDER — LANCETS
EACH MISCELLANEOUS
Qty: 400 EACH | Refills: 1 | Status: SHIPPED | OUTPATIENT
Start: 2023-09-01 | End: 2023-12-27

## 2023-09-01 NOTE — TELEPHONE ENCOUNTER
No care due was identified.  [facility] Embedded Care Due Messages. Reference number: [id number].   9/01/2023 12:18:56 PM CDT

## 2023-09-01 NOTE — TELEPHONE ENCOUNTER
No care due was identified.  [facility] Embedded Care Due Messages. Reference number: [id number].   9/01/2023 11:18:26 AM CDT

## 2023-09-01 NOTE — TELEPHONE ENCOUNTER
Refill Routing Note   Medication(s) are not appropriate for processing by [facility] for the following reason(s):      No active prescription written by provider    [facility] action(s):  Defer Care Due:  None identified            Appointments  past 12m or future 3m with PCP    Date Provider   Last Visit   7/20/2023 Kirk Wilson MD   Next Visit   9/1/2023 Kirk Wilson MD   ED visits in past 90 days: 0        Note composed:12:20 PM 09/01/2023

## 2023-09-11 ENCOUNTER — OFFICE VISIT (OUTPATIENT)
Dept: OTOLARYNGOLOGY | Facility: CLINIC | Age: 68
End: 2023-09-11
Payer: MEDICARE

## 2023-09-11 VITALS — DIASTOLIC BLOOD PRESSURE: 72 MMHG | HEART RATE: 71 BPM | SYSTOLIC BLOOD PRESSURE: 137 MMHG

## 2023-09-11 DIAGNOSIS — J38.01 UNILATERAL COMPLETE VOCAL FOLD PARALYSIS: ICD-10-CM

## 2023-09-11 DIAGNOSIS — R49.0 DYSPHONIA: Primary | ICD-10-CM

## 2023-09-11 PROCEDURE — 31579 PR LARYNGOSCOPY, FLEX/RIGID TELESCOPIC, W/STROBOSCOPY: ICD-10-PCS | Mod: S$PBB,,, | Performed by: OTOLARYNGOLOGY

## 2023-09-11 PROCEDURE — 99999 PR PBB SHADOW E&M-EST. PATIENT-LVL III: ICD-10-PCS | Mod: PBBFAC,,, | Performed by: OTOLARYNGOLOGY

## 2023-09-11 PROCEDURE — 31579 LARYNGOSCOPY TELESCOPIC: CPT | Mod: PBBFAC | Performed by: OTOLARYNGOLOGY

## 2023-09-11 PROCEDURE — 31579 LARYNGOSCOPY TELESCOPIC: CPT | Mod: S$PBB,,, | Performed by: OTOLARYNGOLOGY

## 2023-09-11 PROCEDURE — 99213 OFFICE O/P EST LOW 20 MIN: CPT | Mod: 25,S$PBB,, | Performed by: OTOLARYNGOLOGY

## 2023-09-11 PROCEDURE — 99213 PR OFFICE/OUTPT VISIT, EST, LEVL III, 20-29 MIN: ICD-10-PCS | Mod: 25,S$PBB,, | Performed by: OTOLARYNGOLOGY

## 2023-09-11 PROCEDURE — 99213 OFFICE O/P EST LOW 20 MIN: CPT | Mod: PBBFAC,25 | Performed by: OTOLARYNGOLOGY

## 2023-09-11 PROCEDURE — 99999 PR PBB SHADOW E&M-EST. PATIENT-LVL III: CPT | Mod: PBBFAC,,, | Performed by: OTOLARYNGOLOGY

## 2023-09-11 NOTE — PROGRESS NOTES
[facility]  Department of Otorhinolaryngology and Communication Sciences    Subjective:      Lukasz Person is a 68 y.o. male who presents for follow-up. 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    Treatment History:  SML injection aug - HA (Dr. Altman)    He has done well since his last visit.  He has undergone one dilation of his anastomosis.  He reports very slight intermittent nuisance dysphagia.  He does wake up in the morning with a sensation of excess mucus which is hard for him to clear from his throat.  He wonders if he might be aspirating refluxate overnight.  His voice is quite serviceable.  It is not quite back to normal.  It is slightly effortful, any at times needs to repeat himself.  Nevertheless, he is pleased with his current status    The patient's medications, allergies, past medical, surgical, social and family histories were reviewed and updated as appropriate.    A detailed review of systems was obtained with pertinent positives as per the above HPI, and otherwise negative.      Objective:   VS reviewed  Constitutional: comfortable, well dressed  Psychiatric: appropriate affect  Respiratory: comfortably breathing, symmetric chest rise, no stridor  Voice:  Variable mild roughness/strain  Head: normocephalic  Eyes: conjunctivae and sclerae clear  Indirect laryngoscopy is limited due to gag    Procedure  Flexible Laryngeal Videostroboscopy (97095): Laryngeal videostroboscopy is indicated to assess laryngeal vibratory biomechanics and vocal fold oscillation, which cannot be assessed with a plain light examination. This was carried out transnasally with a distal chip videoendoscope. After verbal consent was obtained, the patient was positioned and the nose was topically decongested with 1% phenylephrine and topically anesthetized with 4% lidocaine. The endoscope was passed through the most patent nasal cavity and positioned to image the nasopharynx,  larynx, and hypopharynx in detail. The following features were examined: nasopharyngeal, laryngeal, hypopharyngeal masses; velopharyngeal strength, closure, and symmetry of motion; vocal fold range and symmetry of motion; laryngeal mucosal edema, erythema, inflammation, and hydration; salivary pooling; and gross laryngeal sensation. During phonation, the vocal folds were assessed for glottal closure; mucosal wave; vocal fold lesions; vibratory periodicity, amplitude, and phase symmetry; and vertical height match. The equipment was removed. The patient tolerated the procedure well without complication. All findings were normal except:  - left vocal fold with slight bowing, with evidence of residual injectate  - right vocal fold with slight bowing  - pliability intact, with slight blunting of mucosal wave amplitudes  - slight posterior over-closure, very mild inconsistent insufficiency        Assessment:     Lukasz Person is a 68 y.o. 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  He has made complete recovery of vocal fold motion.    He does have ongoing mild dysphonia due to mild bilateral vocal fold bowing and secondary muscle tension dysphonia.     Plan:     Reassurance was provided.  Further treatment to consider would include SLP voice evaluation/voice therapy or even consideration of bilateral vocal fold injection augmentation.    He defers on procedural intervention at this time.  He will consider voice therapy.  He will contact us in the future if he would like to set this up.    He will follow up with me  as needed .    All questions were answered, and the patient is in agreement with the plan.    Giovanny Valdivia M.D.  [facility]  Department of Otorhinolaryngology and Communication Sciences

## 2023-09-12 ENCOUNTER — OFFICE VISIT (OUTPATIENT)
Dept: PODIATRY | Facility: CLINIC | Age: 68
End: 2023-09-12
Payer: MEDICARE

## 2023-09-12 VITALS
DIASTOLIC BLOOD PRESSURE: 69 MMHG | SYSTOLIC BLOOD PRESSURE: 136 MMHG | BODY MASS INDEX: 25.73 KG/M2 | WEIGHT: 190 LBS | HEIGHT: 72 IN | HEART RATE: 60 BPM

## 2023-09-12 DIAGNOSIS — E11.22 TYPE 2 DIABETES MELLITUS WITH STAGE 3A CHRONIC KIDNEY DISEASE, WITHOUT LONG-TERM CURRENT USE OF INSULIN: Primary | ICD-10-CM

## 2023-09-12 DIAGNOSIS — D84.81 IMMUNODEFICIENCY SECONDARY TO NEOPLASM: ICD-10-CM

## 2023-09-12 DIAGNOSIS — N18.31 TYPE 2 DIABETES MELLITUS WITH STAGE 3A CHRONIC KIDNEY DISEASE, WITHOUT LONG-TERM CURRENT USE OF INSULIN: Primary | ICD-10-CM

## 2023-09-12 DIAGNOSIS — B35.1 ONYCHOMYCOSIS DUE TO DERMATOPHYTE: ICD-10-CM

## 2023-09-12 DIAGNOSIS — E11.42 DIABETIC POLYNEUROPATHY ASSOCIATED WITH TYPE 2 DIABETES MELLITUS: ICD-10-CM

## 2023-09-12 DIAGNOSIS — R60.0 BILATERAL LOWER EXTREMITY EDEMA: ICD-10-CM

## 2023-09-12 DIAGNOSIS — D49.9 IMMUNODEFICIENCY SECONDARY TO NEOPLASM: ICD-10-CM

## 2023-09-12 PROCEDURE — 99499 NO LOS: ICD-10-PCS | Mod: S$PBB,,, | Performed by: PODIATRIST

## 2023-09-12 PROCEDURE — 11721 DEBRIDE NAIL 6 OR MORE: CPT | Mod: S$PBB,Q9,, | Performed by: PODIATRIST

## 2023-09-12 PROCEDURE — 99999 PR PBB SHADOW E&M-EST. PATIENT-LVL III: CPT | Mod: PBBFAC,,, | Performed by: PODIATRIST

## 2023-09-12 PROCEDURE — 99499 UNLISTED E&M SERVICE: CPT | Mod: S$PBB,,, | Performed by: PODIATRIST

## 2023-09-12 PROCEDURE — 11721 PR DEBRIDEMENT OF NAILS, 6 OR MORE: ICD-10-PCS | Mod: S$PBB,Q9,, | Performed by: PODIATRIST

## 2023-09-12 PROCEDURE — 99213 OFFICE O/P EST LOW 20 MIN: CPT | Mod: PBBFAC,PN | Performed by: PODIATRIST

## 2023-09-12 PROCEDURE — 99999 PR PBB SHADOW E&M-EST. PATIENT-LVL III: ICD-10-PCS | Mod: PBBFAC,,, | Performed by: PODIATRIST

## 2023-09-12 PROCEDURE — 11721 DEBRIDE NAIL 6 OR MORE: CPT | Mod: PBBFAC,PN | Performed by: PODIATRIST

## 2023-09-12 RX ORDER — FENOFIBRATE 160 MG/1
1 TABLET ORAL DAILY
COMMUNITY
End: 2023-09-30

## 2023-09-12 RX ORDER — LISINOPRIL AND HYDROCHLOROTHIAZIDE 20; 25 MG/1; MG/1
TABLET ORAL
COMMUNITY
Start: 2023-07-15 | End: 2023-09-30

## 2023-09-12 RX ORDER — GLIMEPIRIDE 2 MG/1
1 TABLET ORAL EVERY MORNING
COMMUNITY
End: 2023-09-30

## 2023-09-12 RX ORDER — TESTOSTERONE CYPIONATE 200 MG/ML
INJECTION, SOLUTION INTRAMUSCULAR
COMMUNITY
End: 2023-11-21

## 2023-09-12 NOTE — PROGRESS NOTES
[facility]   Physical Therapy Treatment Note      Name: Lukasz Person  Clinic Number: [id number]    Therapy Diagnosis:   Encounter Diagnoses   Name Primary?    Imbalance Yes    Physical deconditioning      Physician: Betina Valdovinos, *    Visit Date: 9/15/2023    Physician Orders: PT Eval and Treat   Medical Diagnosis from Referral: Imbalance  Evaluation Date: 8/25/2023  Authorization Period Expiration: 07/19/2024  Plan of Care Expiration: 10/30/2023  Progress Note Due: 09/25/2023  Visit # / Visits authorized: 1(+1 Eval)/ 20    PTA Visit #: 0/5      Precautions: Standard, Fall, and Immunosuppression      Time In: 1010  Time Out: 1105  Total Billable Time: 55 minutes    Subjective     Patient reports: feel like I'm improving in muscle strength and balance.  He was compliant with home exercise program.  Response to previous treatment: no change reports  Functional change: working in the garden this morning.    Objective      Objective Measures updated at progress report unless specified.     Treatment     Lukasz received the treatments listed below:      therapeutic exercises to develop strength, endurance, ROM, flexibility, posture, and core stabilization for 45 minutes including:  NuStep 10 minutes (instruction on set up) seat @9 and arms at 11  Supine  +Bridges 30x  +Abimael SLR with 2# ankle weight 30x  Sidelying  +Clam bilateral 30x 0#     neuromuscular re-education activities to improve: Balance, Coordination, Kinesthetic, Sense, Proprioception, and Posture for 10 minutes. The following activities were included:  Standing:  +EC, FT firm surface head turns vert and horiz x 10 reps each  +EC, FT compliant surface head turns vert and horiz x 10 reps each; LOB on last rep of vertical HTs   +SLS on foam oval, hip flex, ext, abd x 10 reps x 2 sets 0 lbs    therapeutic activities to improve functional performance for 00  minutes, including:     gait training to improve functional mobility  and safety for 00  minutes, including:      Patient Education and Home Exercises       Education provided:   - balance  -falls and safety    Written Home Exercises Provided: yes. Exercises were reviewed and Lukasz was able to demonstrate them prior to the end of the session.  Lukasz demonstrated good  understanding of the education provided. See Electronic Medical Record under Patient Instructions for exercises provided during therapy sessions    Assessment     Pt resented with very good tolerance to strengthening and balance activities.  Continue to progress with hip/core strengthening/stabilization program to tolerance and continue to change standing balance and posture with sensory integration in balance activities.    This is a 68 y.o. male with a medical diagnosis of        Encounter Diagnoses   Name Primary?    Physical deconditioning Yes    Imbalance     Patient presents with impaired balance. Lukasz will benefit from skilled physical therapy intervention to address the above impairments and functional limitations. Pt would benefit from general strengthening and conditioning while including weightbearing balance activitites to improve safe functional mobility to meet the patient's goals for therapy.    Lukasz Is progressing well towards his goals.   Patient prognosis is Excellent.     Patient will continue to benefit from skilled outpatient physical therapy to address the deficits listed in the problem list box on initial evaluation, provide pt/family education and to maximize pt's level of independence in the home and community environment.     Patient's spiritual, cultural and educational needs considered and pt agreeable to plan of care and goals.     Anticipated barriers to physical therapy: saima    The following goals were discussed with the patient and patient is in agreement with them as to be addressed in the treatment plan.     Goals:  STG'S: 3 weeks  1. Patient independent in HEP of balance and  head-eye coordination.  Exercises to be done Daily.  2. Patient able to perform forward bending without LOB or c/o dizziness  3. Pt to improve Hughes by 4-6 points for improved safety with functional mobility     LTG'S  : 6 weeks  1. Patient able to ambulate in community safely without assistive device, on varied terrainwith head movement, without LOB or c/o dizziness.  2. Pt to be I with self management of condition and progression vestibular program for maintenance.  3. Pt to improve Hughes by 6-10 points for improved safety with functional mobility    Plan   Plan of care Certification: 8/25/2023 to 10/30/2023.     PLAN: progress LE/Hip/Core stabilization program to tolerance and progress balance activities to improve functional balance and safe mobility.     Lukasz will be seen 2 times per weeks for the next 6 weeks for neuromuscular education, balance and coordinatoin training, therapeutic exercise, gait training, proprioception exercises, postural education, dynamic standing balance exercises, sensory organization activities, eye head coordination exercises , and and other therapeutic interventions as deemed necessary to address above goals.. Pt may be seen by a PTA as part of the Rehab Team.      Clarissa Renae, PT, DPT, MHA, CLT, CEAS

## 2023-09-12 NOTE — PROGRESS NOTES
Subjective:      Patient ID: Luksaz Person is a 68 y.o. male.    Chief Complaint:   Nail Care and Diabetes Mellitus (PCP- Kirk Wilson MD-//7/20/2023)    Lukasz is a 68 y.o. male who returns to the clinic or evaluation and treatment of diabetic feet. Lukasz has a past medical history of Anticoagulant long-term use, Diabetes mellitus, Hyperlipidemia, Hypertension, Kidney stones, and Sleep apnea.      Patient relates he is coming in for a foot exam and have the nails evaluated no new complaints    PCP: Kirk Wilson MD    Date Last Seen by PCP: 7/20/23    Current shoe gear: Tennis shoes    Hemoglobin A1C   Date Value Ref Range Status   05/29/2023 6.2 (H) 4.0 - 5.6 % Final     Comment:     ADA Screening Guidelines:  5.7-6.4%  Consistent with prediabetes  >or=6.5%  Consistent with diabetes    High levels of fetal hemoglobin interfere with the HbA1C  assay. Heterozygous hemoglobin variants (HbS, HgC, etc)do  not significantly interfere with this assay.   However, presence of multiple variants may affect accuracy.     03/14/2023 6.6 (H) 4.0 - 5.6 % Final     Comment:     ADA Screening Guidelines:  5.7-6.4%  Consistent with prediabetes  >or=6.5%  Consistent with diabetes    High levels of fetal hemoglobin interfere with the HbA1C  assay. Heterozygous hemoglobin variants (HbS, HgC, etc)do  not significantly interfere with this assay.   However, presence of multiple variants may affect accuracy.     11/07/2022 6.9 (H) 4.0 - 5.6 % Final     Comment:     ADA Screening Guidelines:  5.7-6.4%  Consistent with prediabetes  >or=6.5%  Consistent with diabetes    High levels of fetal hemoglobin interfere with the HbA1C  assay. Heterozygous hemoglobin variants (HbS, HgC, etc)do  not significantly interfere with this assay.   However, presence of multiple variants may affect accuracy.              Past Medical History:   Diagnosis Date    Anticoagulant long-term use     Diabetes mellitus     Onset late 50s/early 60s    Hyperlipidemia      Hypertension     Onset late 50s/early 60s    Kidney stones     Sleep apnea     since 2006     Past Surgical History:   Procedure Laterality Date    CORONARY ANGIOGRAPHY N/A 9/30/2020    Procedure: ANGIOGRAM, CORONARY ARTERY;  Surgeon: John West MD;  Location: [facility] CATH LAB;  Service: Cardiology;  Laterality: N/A;    CYSTOSCOPY N/A 2/17/2022    Procedure: CYSTOSCOPY;  Surgeon: Lukasz Hughes MD;  [facility];  Service: Urology;  Laterality: N/A;    CYSTOSCOPY W/ URETERAL STENT PLACEMENT N/A 2/25/2022    Procedure: CYSTOSCOPY, WITH URETERAL STENT INSERTION;  Surgeon: Lukasz Hughes MD;  Location: [facility] OR [facility] FLR;  Service: Urology;  Laterality: N/A;    ENDOSCOPIC ULTRASOUND OF UPPER GASTROINTESTINAL TRACT N/A 12/7/2022    Procedure: ULTRASOUND, UPPER GI TRACT, ENDOSCOPIC;  Surgeon: Darian Main MD;  Location: [facility] ENDO;  Service: Endoscopy;  Laterality: N/A;  Approval to hold Xarelto rec'd from Dr. Nick (see t/e 12/5/22)-DS    ESOPHAGOGASTRODUODENOSCOPY N/A 11/17/2022    Procedure: EGD (ESOPHAGOGASTRODUODENOSCOPY);  Surgeon: Brody Gonzales MD;  Location: [facility] (2ND FLR);  Service: Endoscopy;  Laterality: N/A;  inst via email-ok to hold Xarelto x 2 days-MS    ESOPHAGOGASTRODUODENOSCOPY N/A 5/31/2023    Procedure: EGD (ESOPHAGOGASTRODUODENOSCOPY) WITH DILATION;  Surgeon: Santana Brown Jr., MD;  Location: [facility] OR 2ND FLR;  Service: General;  Laterality: N/A;    LASER LITHOTRIPSY Left 2/25/2022    Procedure: LITHOTRIPSY, USING LASER;  Surgeon: Lukasz Hughes MD;  [facility];  Service: Urology;  Laterality: Left;    LEFT HEART CATHETERIZATION Left 9/30/2020    Procedure: Left heart cath;  Surgeon: John West MD;  Location: [facility] CATH LAB;  Service: Cardiology;  Laterality: Left;    LITHOTRIPSY      PARATHYROIDECTOMY  1/1/2-107    PYELOSCOPY Left 2/25/2022    Procedure: PYELOSCOPY;  Surgeon: Lukasz Hughes MD;  [facility];   Service: Urology;  Laterality: Left;    ROBOT-ASSISTED SURGICAL REMOVAL OF ESOPHAGUS USING DA CARLOS XI N/A 3/14/2023    Procedure: XI ROBOTIC ESOPHAGECTOMY;  Surgeon: Santana Brown Jr., MD;  [facility];  Service: General;  Laterality: N/A;  Abdomen, Chest and Neck    ROBOTIC JEJUNOSTOMY N/A 3/14/2023    Procedure: ROBOTIC JEJUNOSTOMY TUBE INSERTION;  Surgeon: Santana Brown Jr., MD;  [facility];  Service: General;  Laterality: N/A;    TRANSESOPHAGEAL ECHOCARDIOGRAPHY N/A 4/7/2022    Procedure: ECHOCARDIOGRAM, TRANSESOPHAGEAL;  Surgeon: Xander Diagnostic Provider;  Location: [facility] EP LAB;  Service: Cardiology;  Laterality: N/A;    TREATMENT OF CARDIAC ARRHYTHMIA N/A 4/7/2022    Procedure: Cardioversion or Defibrillation;  Surgeon: Iris Fisher NP;  Location: [facility] EP LAB;  Service: Cardiology;  Laterality: N/A;  afib, dccv, artie, anes, EH, 3prep    URETEROSCOPIC REMOVAL OF URETERIC CALCULUS Left 2/25/2022    Procedure: REMOVAL, CALCULUS, URETER, URETEROSCOPIC;  Surgeon: Lukasz Hughes MD;  [facility];  Service: Urology;  Laterality: Left;    URETEROSCOPY Left 2/25/2022    Procedure: URETEROSCOPY;  Surgeon: Lukasz Hughes MD;  [facility];  Service: Urology;  Laterality: Left;    VOCAL CORD INJECTION Left 3/17/2023    Procedure: INJECTION, VOCAL CORD, LARYNGOSCOPIC;  Surgeon: Gm Altman MD;  [address]  Service: ENT;  Laterality: Left;     Current Outpatient Medications on File Prior to Visit   Medication Sig Dispense Refill    allopurinoL (ZYLOPRIM) 100 MG tablet TAKE 1 TABLET BY MOUTH EVERY DAY 30 tablet 10    blood sugar diagnostic (ACCU-CHEK ANTONELLA PLUS TEST STRP) Strp Use to test CBG four times daily E11.65 400 strip 3    blood-glucose meter kit Checks blood sugars 1x/daily. 1 each 12    hydroCHLOROthiazide (HYDRODIURIL) 25 MG tablet Take 1 tablet (25 mg total) by mouth once daily. 30 tablet 11    lancets (ACCU-CHEK  SOFTCLIX LANCETS) Misc Use to test CBG 4 times daily E11.65 400 each 1    lisinopriL-hydrochlorothiazide (PRINZIDE,ZESTORETIC) 20-25 mg Tab       metoprolol succinate (TOPROL-XL) 25 MG 24 hr tablet Take 0.5 tablets (12.5 mg total) by mouth once daily. 15 tablet 11    nitroGLYCERIN (NITROSTAT) 0.4 MG SL tablet Place 1 tablet (0.4 mg total) under the tongue every 5 (five) minutes as needed for Chest pain. If you need a third tablet, call 911 60 tablet 12    omeprazole (PRILOSEC) 40 MG capsule Take 1 capsule (40 mg total) by mouth once daily. 90 capsule 3    rivaroxaban (XARELTO) 20 mg Tab Take 1 tablet (20 mg total) by mouth daily with dinner or evening meal. 90 tablet 3    rosuvastatin (CRESTOR) 20 MG tablet TAKE 1 TABLET(20 MG) BY MOUTH EVERY DAY (Patient taking differently: Take 20 mg by mouth every evening.) 30 tablet 11    tamsulosin (FLOMAX) 0.4 mg Cap Take 1 capsule (0.4 mg total) by mouth once daily. (Patient taking differently: Take 0.4 mg by mouth nightly.) 30 capsule 11    testosterone (ANDROGEL) 20.25 mg/1.25 gram (1.62 %) GlPm Apply 4 pumps to shoulders daily 2 each 5    testosterone cypionate (DEPOTESTOTERONE CYPIONATE) 200 mg/mL injection INJECT 2ML INTRAMUSCULARLY ONCE A MONTH AS DIRECTED      triamcinolone acetonide 0.1% (KENALOG) 0.1 % cream Apply topically 2 (two) times daily. 45 g 1    fenofibrate 160 MG Tab Take 1 tablet by mouth once daily.      glimepiride (AMARYL) 2 MG tablet Take 1 tablet by mouth every morning.       No current facility-administered medications on file prior to visit.     Review of patient's allergies indicates:  No Known Allergies    Review of Systems   Constitutional: Negative for chills, decreased appetite, fever, malaise/fatigue, night sweats, weight gain and weight loss.   Cardiovascular:  Negative for chest pain, claudication, dyspnea on exertion, leg swelling, palpitations and syncope.   Respiratory:  Negative for cough and shortness of breath.    Endocrine: Negative  for cold intolerance and heat intolerance.   Hematologic/Lymphatic: Negative for bleeding problem. Does not bruise/bleed easily.   Skin:  Positive for nail changes. Negative for color change, dry skin, flushing, itching, poor wound healing, rash, skin cancer, suspicious lesions and unusual hair distribution.   Musculoskeletal:  Positive for arthritis and stiffness. Negative for back pain, falls, gout, joint pain, joint swelling, muscle cramps, muscle weakness, myalgias and neck pain.   Gastrointestinal:  Negative for diarrhea, nausea and vomiting.   Neurological:  Positive for numbness and paresthesias. Negative for dizziness, focal weakness, light-headedness, tremors, vertigo and weakness.   Psychiatric/Behavioral:  Negative for altered mental status and depression. The patient does not have insomnia.    Allergic/Immunologic: Negative.            Objective:       Vitals:    09/12/23 1355   BP: 136/69   Pulse: 60   Weight: 86.2 kg (190 lb)   Height: 6' (1.829 m)   PainSc: 0-No pain   86.2 kg (190 lb)     Physical Exam  Vitals reviewed.   Constitutional:       General: He is not in acute distress.     Appearance: He is well-developed. He is not ill-appearing, toxic-appearing or diaphoretic.      Comments: Proper supportive shoegear   Cardiovascular:      Pulses:           Dorsalis pedis pulses are 2+ on the right side and 2+ on the left side.        Posterior tibial pulses are 1+ on the right side and 1+ on the left side.   Musculoskeletal:      Right lower leg: No tenderness. 1+ Edema present.      Left lower leg: No tenderness. 1+ Edema present.      Right foot: Decreased range of motion. Deformity, bunion and prominent metatarsal heads present. No tenderness or bony tenderness.      Left foot: Decreased range of motion. Deformity, bunion, prominent metatarsal heads and tenderness present. No bony tenderness.      Comments: Mild pain with debridement left hallux nail no pain to digit   Feet:      Right foot:       Protective Sensation: 10 sites tested.  6 sites sensed.      Skin integrity: No ulcer, blister, skin breakdown, erythema, warmth, callus, dry skin or fissure.      Toenail Condition: Right toenails are abnormally thick and long.      Left foot:      Protective Sensation: 10 sites tested.  6 sites sensed.      Skin integrity: No ulcer, blister, skin breakdown, erythema, warmth, callus, dry skin or fissure.      Toenail Condition: Left toenails are abnormally thick and long.      Comments:   No open lesions    SWM decreased to digits and forefoot    Nails 1-10 thickened elongated discolored left hallux nail has distal half lysis with mild maceration subungual there is no acute signs of infection.       No fissures noted today      Skin:     General: Skin is warm and dry.      Capillary Refill: Capillary refill takes 2 to 3 seconds.      Coloration: Skin is not pale.      Findings: No erythema or rash.      Nails: There is no clubbing.   Neurological:      Mental Status: He is alert and oriented to person, place, and time.      Gait: Gait abnormal.   Psychiatric:         Attention and Perception: Attention normal.         Mood and Affect: Mood normal.         Speech: Speech normal.         Behavior: Behavior normal.         Thought Content: Thought content normal.         Judgment: Judgment normal.               Assessment:       Encounter Diagnoses   Name Primary?    Type 2 diabetes mellitus with stage 3a chronic kidney disease, without long-term current use of insulin Yes    Diabetic polyneuropathy associated with type 2 diabetes mellitus     Bilateral lower extremity edema     Immunodeficiency secondary to neoplasm     Onychomycosis due to dermatophyte              Plan:       Lukasz was seen today for nail care and diabetes mellitus.    Diagnoses and all orders for this visit:    Type 2 diabetes mellitus with stage 3a chronic kidney disease, without long-term current use of insulin    Diabetic polyneuropathy  associated with type 2 diabetes mellitus    Bilateral lower extremity edema    Immunodeficiency secondary to neoplasm    Onychomycosis due to dermatophyte          I counseled the patient on his conditions, their implications and medical management.     Feet doing well     - Shoe inspection. Diabetic Foot Education. Patient reminded of the importance of good nutrition and blood sugar control to help prevent podiatric complications of diabetes. Patient instructed on proper foot hygeine. We discussed wearing proper shoe gear, daily foot inspections, never walking without protective shoe gear, never putting sharp instruments to feet        With patient's permission, the toenails mentioned above were  reduced and debrided using a nail nipper, removing all offending nail and debris   The patient will continue to monitor the areas daily, inspect the feet, wear protective shoe gear when ambulatory, and moisturizer to maintain skin integrity.      Monitor left hallux nail    F/u 3 months

## 2023-09-13 ENCOUNTER — CLINICAL SUPPORT (OUTPATIENT)
Dept: REHABILITATION | Facility: OTHER | Age: 68
End: 2023-09-13
Payer: MEDICARE

## 2023-09-13 DIAGNOSIS — R26.89 IMBALANCE: Primary | ICD-10-CM

## 2023-09-13 PROCEDURE — 97530 THERAPEUTIC ACTIVITIES: CPT | Mod: PN

## 2023-09-13 PROCEDURE — 97112 NEUROMUSCULAR REEDUCATION: CPT | Mod: PN

## 2023-09-13 PROCEDURE — 97110 THERAPEUTIC EXERCISES: CPT | Mod: PN

## 2023-09-13 NOTE — PATIENT INSTRUCTIONS
"ABDOMINAL BRACING - TA ACTIVATION        While lying on your back, press your low back into the floor as you tighten your stomach muscles moving your navel down towards the floor. Place your thumbs 2 inches inward from your pelvic bone so that you can feel the muscle bill.     Hold for 5 seconds. Perform 10 repetitions     Copyright © 2023 [employer]    Pelvic Tilt         Lying on your back with your knees bent, gently flatten your back to the table and roll your hips up. Slowly return to the starting position and repeat.    Hold for 5 seconds. Perform 10 repetitions     Copyright © 2023 [employer]      BRIDGING        While lying on your back, tighten your lower abdominals, squeeze your buttocks and then raise your buttocks off the floor/bed as creating a "Bridge" with your body. Hold and then lower yourself and repeat.     Perform 10 reps holding for 5 seconds.      Copyright © 2023 [employer]      HIP ABDUCTION - SIDELYING            While lying on your side, slowly raise up your top leg to the side. Keep your knee straight and maintain your toes pointed forward the entire time. Keep your leg in-line with your body.  The bottom leg can be bent to stabilize your body.    Hold and then lower yourself and repeat.     Perform 2 sets of 10 reps holding for 3-5 seconds.    Copyright © 2023 [employer]    CLAM SHELLS      While lying on your side with your knees bent, draw up the top knee while keeping contact of your feet together.    Do not let your pelvis roll back during the lifting movement.    Hold and then lower yourself and repeat.     Perform 20 reps holding for 3-5 seconds.    Copyright © 2023 [employer]    "

## 2023-09-13 NOTE — PROGRESS NOTES
"[facility]   Physical Therapy Treatment Note     Name: Lukasz Person  Clinic Number: [id number]    Therapy Diagnosis:   Encounter Diagnosis   Name Primary?    Imbalance Yes     Physician: Kirk Wilson MD    Visit Date: 9/13/2023  Physician Orders: PT Evaluation  and Treat   Medical Diagnosis from Referral: Imbalance  Evaluation Date: 8/25/2023  Authorization Period Expiration: 12/31/2023  Plan of Care Expiration: 10/30/2023  Progress Note Due: 09/25/2023  Visit #/Visits authorized: 2 (1/ 20)  Focus On Therapeutic Outcomes Tool: 2/ 5   PTA Visit #: 0/5     Time In: 0833  Time Out: 0915  Total Billable Time: 42 minutes    Precautions: Standard, Fall, and Immunosuppression     Subjective   Patient reports: he has not had any falls, stumbles or episodes of loss of balance since evaluation.  He was not issued a home exercise program.  Response to previous treatment: no adverse effects  Functional change: feels his core strength and balance is improving with yard chores and household chores    Pain: 0/10  Location: no location    OBJECTIVE     Objective Measures updated at progress report unless specified.     Treatment     Lukasz received therapeutic exercises to develop strength, posture, and core stabilization for 10 minutes including:  [x]Bridging 10 x 5"  [x]Side lying hip abduction 10 x 3"  [x]Side lying clam 20 x 5"      Lukasz participated in neuromuscular re-education activities to improve: Balance, Coordination, Proprioception, and Posture for 15 minutes. The following activities were included:  [x]Transverse abdominus bracing 10 x 5"  [x]Posterior pelvic tilts 10 x 5"  [x]Green theratube pulls with marching 3 x 30"  [x]Green theratube reciprocal pulls with marching 3 x 30"    Lukasz participated in dynamic functional therapeutic activities to improve functional performance for 18  minutes, including:  [x]Walking with horizontal abduction pulls with green band 2 x 60"  [x]Walking with " "diagonal pulls with green band 2 x 60"  [x]Lateral walking 4 x 60"  [x]Hesitation walk 4 x 60"    Lukasz participated in gait training to improve functional mobility and safety for 00  minutes, including:        Home Exercises Provided and Patient Education Provided     Education provided:   Transverse abdominus bracing  Posterior pelvic tilts  Side lying hip abduction  Side lying clams  bridging    Written Home Exercises Provided: yes.  Exercises were reviewed and Lukasz was able to demonstrate them prior to the end of the session.  Lukasz demonstrated good  understanding of the education provided.     See Electronic Medical Record under Patient Instructions for exercises provided 9/13/2023.    Assessment     Presenting with increased thoracic kyphosis. Initiated home exercise program today focusing on core activation and lower extremities strengthening. Also incorporated upper extremity pulls with walking to promote upright posture with walking to improve functional mobility and balance safety.    Lukasz Is progressing well towards his goals.   Patient prognosis is Good.     Patient will continue to benefit from skilled outpatient physical therapy to address the deficits listed in the problem list box on initial evaluation, provide pt/family education and to maximize pt's level of independence in the home and community environment.     Patient's spiritual, cultural and educational needs considered and pt agreeable to plan of care and goals.    Anticipated barriers to physical therapy: none    Goals:   Short Term Goals: 3 weeks  1. Patient independent in home exercise program of balance and head-eye coordination.  Exercises to be done Daily. (In progress)  2. Patient able to perform forward bending without loss of balance or c/o dizziness. (In progress)  3. Pt to improve Hughes by 4-6 points for improved safety with functional mobility. (In progress)     Long Term Goals  : 6 weeks  1. Patient able to ambulate in " community safely without assistive device, on varied terrainwith head movement, without loss of balance or c/o dizziness. (In progress)  2. Pt to be Independent with self management of condition and progression vestibular program for maintenance. (In progress)  3. Pt to improve Hughes by 6-10 points for improved safety with functional mobility. (In progress)    Plan     Continue with lower extremities strengthening, balance training, core strengthening, and postural reeducation    Schuyler Chaney, PT

## 2023-09-15 ENCOUNTER — CLINICAL SUPPORT (OUTPATIENT)
Dept: REHABILITATION | Facility: OTHER | Age: 68
End: 2023-09-15
Payer: MEDICARE

## 2023-09-15 DIAGNOSIS — R26.89 IMBALANCE: Primary | ICD-10-CM

## 2023-09-15 DIAGNOSIS — R53.81 PHYSICAL DECONDITIONING: ICD-10-CM

## 2023-09-15 PROCEDURE — 97110 THERAPEUTIC EXERCISES: CPT | Mod: PN | Performed by: PHYSICAL THERAPIST

## 2023-09-15 PROCEDURE — 97112 NEUROMUSCULAR REEDUCATION: CPT | Mod: PN | Performed by: PHYSICAL THERAPIST

## 2023-09-18 ENCOUNTER — PATIENT MESSAGE (OUTPATIENT)
Dept: INTERNAL MEDICINE | Facility: CLINIC | Age: 68
End: 2023-09-18
Payer: MEDICARE

## 2023-09-18 ENCOUNTER — INFUSION (OUTPATIENT)
Dept: INFUSION THERAPY | Facility: HOSPITAL | Age: 68
End: 2023-09-18
Payer: MEDICARE

## 2023-09-18 ENCOUNTER — TELEPHONE (OUTPATIENT)
Dept: INTERNAL MEDICINE | Facility: CLINIC | Age: 68
End: 2023-09-18
Payer: MEDICARE

## 2023-09-18 ENCOUNTER — RESEARCH ENCOUNTER (OUTPATIENT)
Dept: RESEARCH | Facility: HOSPITAL | Age: 68
End: 2023-09-18
Payer: MEDICARE

## 2023-09-18 ENCOUNTER — OFFICE VISIT (OUTPATIENT)
Dept: HEMATOLOGY/ONCOLOGY | Facility: CLINIC | Age: 68
End: 2023-09-18
Payer: MEDICARE

## 2023-09-18 VITALS
DIASTOLIC BLOOD PRESSURE: 60 MMHG | SYSTOLIC BLOOD PRESSURE: 132 MMHG | WEIGHT: 187.19 LBS | RESPIRATION RATE: 18 BRPM | OXYGEN SATURATION: 98 % | TEMPERATURE: 98 F | HEIGHT: 72 IN | HEART RATE: 49 BPM | BODY MASS INDEX: 25.35 KG/M2

## 2023-09-18 VITALS
RESPIRATION RATE: 18 BRPM | OXYGEN SATURATION: 99 % | SYSTOLIC BLOOD PRESSURE: 150 MMHG | DIASTOLIC BLOOD PRESSURE: 67 MMHG | TEMPERATURE: 98 F | HEART RATE: 52 BPM

## 2023-09-18 DIAGNOSIS — I50.20 HFREF (HEART FAILURE WITH REDUCED EJECTION FRACTION): ICD-10-CM

## 2023-09-18 DIAGNOSIS — N18.30 TYPE 2 DIABETES MELLITUS WITH STAGE 3 CHRONIC KIDNEY DISEASE, WITHOUT LONG-TERM CURRENT USE OF INSULIN, UNSPECIFIED WHETHER STAGE 3A OR 3B CKD: ICD-10-CM

## 2023-09-18 DIAGNOSIS — E11.29 TYPE 2 DIABETES MELLITUS WITH OTHER DIABETIC KIDNEY COMPLICATION, WITHOUT LONG-TERM CURRENT USE OF INSULIN: ICD-10-CM

## 2023-09-18 DIAGNOSIS — E11.69 OBESITY, DIABETES, AND HYPERTENSION SYNDROME: ICD-10-CM

## 2023-09-18 DIAGNOSIS — I25.10 CORONARY ARTERY DISEASE INVOLVING NATIVE CORONARY ARTERY OF NATIVE HEART WITHOUT ANGINA PECTORIS: ICD-10-CM

## 2023-09-18 DIAGNOSIS — E11.59 HYPERTENSION ASSOCIATED WITH DIABETES: ICD-10-CM

## 2023-09-18 DIAGNOSIS — E11.69 HYPERLIPIDEMIA ASSOCIATED WITH TYPE 2 DIABETES MELLITUS: ICD-10-CM

## 2023-09-18 DIAGNOSIS — E66.9 OBESITY, DIABETES, AND HYPERTENSION SYNDROME: ICD-10-CM

## 2023-09-18 DIAGNOSIS — I48.0 PAROXYSMAL ATRIAL FIBRILLATION: ICD-10-CM

## 2023-09-18 DIAGNOSIS — K04.7 DENTAL INFECTION: ICD-10-CM

## 2023-09-18 DIAGNOSIS — C15.9 ESOPHAGEAL ADENOCARCINOMA: Primary | ICD-10-CM

## 2023-09-18 DIAGNOSIS — E78.5 HYPERLIPIDEMIA ASSOCIATED WITH TYPE 2 DIABETES MELLITUS: ICD-10-CM

## 2023-09-18 DIAGNOSIS — R21 RASH: ICD-10-CM

## 2023-09-18 DIAGNOSIS — I15.2 HYPERTENSION ASSOCIATED WITH DIABETES: ICD-10-CM

## 2023-09-18 DIAGNOSIS — E11.22 TYPE 2 DIABETES MELLITUS WITH STAGE 3 CHRONIC KIDNEY DISEASE, WITHOUT LONG-TERM CURRENT USE OF INSULIN, UNSPECIFIED WHETHER STAGE 3A OR 3B CKD: ICD-10-CM

## 2023-09-18 DIAGNOSIS — J38.00 VOCAL CORD PARALYSIS: ICD-10-CM

## 2023-09-18 DIAGNOSIS — E11.59 OBESITY, DIABETES, AND HYPERTENSION SYNDROME: ICD-10-CM

## 2023-09-18 DIAGNOSIS — I15.2 OBESITY, DIABETES, AND HYPERTENSION SYNDROME: ICD-10-CM

## 2023-09-18 PROCEDURE — 99215 PR OFFICE/OUTPT VISIT, EST, LEVL V, 40-54 MIN: ICD-10-PCS | Mod: Q1,S$PBB,, | Performed by: INTERNAL MEDICINE

## 2023-09-18 PROCEDURE — 99999 PR PBB SHADOW E&M-EST. PATIENT-LVL IV: ICD-10-PCS | Mod: PBBFAC,,, | Performed by: INTERNAL MEDICINE

## 2023-09-18 PROCEDURE — 99999 PR PBB SHADOW E&M-EST. PATIENT-LVL IV: CPT | Mod: PBBFAC,,, | Performed by: INTERNAL MEDICINE

## 2023-09-18 PROCEDURE — 96413 CHEMO IV INFUSION 1 HR: CPT

## 2023-09-18 PROCEDURE — 25000003 PHARM REV CODE 250: Mod: Q1 | Performed by: INTERNAL MEDICINE

## 2023-09-18 PROCEDURE — 99215 OFFICE O/P EST HI 40 MIN: CPT | Mod: Q1,S$PBB,, | Performed by: INTERNAL MEDICINE

## 2023-09-18 PROCEDURE — 99214 OFFICE O/P EST MOD 30 MIN: CPT | Mod: PBBFAC,25,Q1 | Performed by: INTERNAL MEDICINE

## 2023-09-18 PROCEDURE — 63600175 PHARM REV CODE 636 W HCPCS: Mod: Q1 | Performed by: INTERNAL MEDICINE

## 2023-09-18 PROCEDURE — A4216 STERILE WATER/SALINE, 10 ML: HCPCS | Mod: Q1 | Performed by: INTERNAL MEDICINE

## 2023-09-18 RX ORDER — HEPARIN 100 UNIT/ML
500 SYRINGE INTRAVENOUS
Status: DISCONTINUED | OUTPATIENT
Start: 2023-09-18 | End: 2023-09-18 | Stop reason: HOSPADM

## 2023-09-18 RX ORDER — SODIUM CHLORIDE 0.9 % (FLUSH) 0.9 %
10 SYRINGE (ML) INJECTION
Status: CANCELLED | OUTPATIENT
Start: 2023-09-18

## 2023-09-18 RX ORDER — DIPHENHYDRAMINE HYDROCHLORIDE 50 MG/ML
50 INJECTION INTRAMUSCULAR; INTRAVENOUS ONCE AS NEEDED
Status: DISCONTINUED | OUTPATIENT
Start: 2023-09-18 | End: 2023-09-18 | Stop reason: HOSPADM

## 2023-09-18 RX ORDER — DIPHENHYDRAMINE HYDROCHLORIDE 50 MG/ML
50 INJECTION INTRAMUSCULAR; INTRAVENOUS ONCE AS NEEDED
Status: CANCELLED | OUTPATIENT
Start: 2023-09-18

## 2023-09-18 RX ORDER — HEPARIN 100 UNIT/ML
500 SYRINGE INTRAVENOUS
Status: CANCELLED | OUTPATIENT
Start: 2023-09-18

## 2023-09-18 RX ORDER — EPINEPHRINE 0.3 MG/.3ML
0.3 INJECTION SUBCUTANEOUS ONCE AS NEEDED
Status: DISCONTINUED | OUTPATIENT
Start: 2023-09-18 | End: 2023-09-18 | Stop reason: HOSPADM

## 2023-09-18 RX ORDER — SODIUM CHLORIDE 0.9 % (FLUSH) 0.9 %
10 SYRINGE (ML) INJECTION
Status: DISCONTINUED | OUTPATIENT
Start: 2023-09-18 | End: 2023-09-18 | Stop reason: HOSPADM

## 2023-09-18 RX ORDER — EPINEPHRINE 0.3 MG/.3ML
0.3 INJECTION SUBCUTANEOUS ONCE AS NEEDED
Status: CANCELLED | OUTPATIENT
Start: 2023-09-18

## 2023-09-18 RX ADMIN — Medication 10 ML: at 02:09

## 2023-09-18 RX ADMIN — SODIUM CHLORIDE: 9 INJECTION, SOLUTION INTRAVENOUS at 01:09

## 2023-09-18 RX ADMIN — HEPARIN 500 UNITS: 100 SYRINGE at 02:09

## 2023-09-18 NOTE — PROGRESS NOTES
: URIEL Manriquez MD  Treating Investigators: NIRAV Medrano MD  Surgical Oncologist: SARAH Brown M.D. 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 Patient queried & verbalized his consent for continued participation in above-mentioned trial. Patient queried and continues to report dysphagia, dysgeusia, hoarseness, fatigue, intermittent nausea, and pruritic dermatis in his chest & upper back with new scalp pruritic dermatitis within the last 2 weeks.  Patient denies any other new or changed ConMeds.    Patient completed safety labs, VS, PE & ECOG (ECOG = 0, per Dr. Medrano) today per protocol. Dr. Medrano assessed all patient's labs, VS & PE findings as either WNL or NCS, deeming patient eligible to continue treatment with Nivolumab monotherapy.     Weight:  Step 1 Week 1 was 117.2 kg  Step 2 C1D1 was 99.2 kg   ( +/- 10% = 89.3 - 109.2 kg).   Today's weight is 84.9 kg   >20% weight loss since Step 1 Week 1 --> >10% change from Step 2 Cycle 1.      Per protocol, Nivolumab is administered at a 480 mg flat dose & cannot be modified. [facility] & Dr. Medrano performed time-out in exam room.     Infusion RN Elena Byrne was instructed to administer the treatment per the treatment plan with full sets of vital signs pre- and post-dose. RN expressed their understanding of all instructions. Patient completed treatment today without event & was DC'd from clinic ambulatory and in stable condition. Please see Infusion RN note for further information.     Patient was encouraged to contact [facility] or   [facility]'s team with any questions or concerns & was reminded of clinic's 24/7 emergency contact number. Patient verbalized understanding & denies any additional questions at this time.        Step 2 -- C6D1 - [id number]:  labs [facility] 3rd floor @ 1030  [facility] 2nd floor @ 1130  infusion (Nivolumab only) @ 1300        Solicited AEs -- Assessed 21Aug2023:  ** Anemia - Grade 1 -- (NCS per MD) -- continued from prior visit  ** Platelet count decreased - Grade 1 -- 9/18/2023 (NCS per MD)  White blood cell count decreased - Grade 0   Neutrophil count decreased - Grade 0  ** Lymphocyte count decreased - Grade 2   (NCS per MD)   7/24/2023 = Grade 2 -- NCS per MD  Peripheral motor neuropathy - Grade 0   Peripheral sensory neuropathy - Grade 0   Creatinine increased - Grade 0  Hypothyroidism - Grade 0   (TSH WNL on 24Jul2023)  Diarrhea (patient baseline BM = 2 stools a day) - Grade 0 -- reports loose stools vs. 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      Ongoing Non-Solicited AEs:  Anorexia - Grade 1 - 09Jan2023 - ongoing (no treatment)  Dysgeusia - Grade 1 - 14Mar2023 - ongoing ( no treatment )  Hoarseness - Grade 2 - 14Mar2023 - ongoing ( no treatment )  Dysphagia - Grade 1 - 16Jun2023 - ongoing ( no treatment )  Weight loss - Grade 2 - 24Jul2023 - ongoing ( no treatment )      New or Changed AEs Since Last Visit:  Generalized weakness - Grade  1 - [id number] - 8/30/2023 ( no treatment )           Complete Baseline Medical History, Adverse Events, and Concomitant Medications are located in patient's shadow chart

## 2023-09-18 NOTE — PROGRESS NOTES
MEDICAL ONCOLOGY - ESTABLISHED PATIENT VISIT    Reason for visit: esophageal cancer    Best Contact Phone Number(s): There are no phone numbers on file.     Cancer/Stage/TNM:    Cancer Staging   Esophageal adenocarcinoma  Staging form: Esophagus - Adenocarcinoma, AJCC 8th Edition  - Pathologic stage from 3/28/2023: Stage II (ypT3, pN0, cM0, G2) - Signed by Santana Brown Jr., MD on 3/28/2023       Oncology History   Esophageal adenocarcinoma   12/8/2022 Initial Diagnosis    Esophageal adenocarcinoma     12/21/2022 - 12/21/2022 Chemotherapy    Treatment Summary   Plan Name: OP ESOPHAGEAL PACLITAXEL CARBOPLATIN WEEKLY  Treatment Goal: Curative  Status: Inactive  Start Date:   End Date:   Provider: Miki Medrano MD  Chemotherapy: CARBOplatin (PARAPLATIN) in sodium chloride 0.9% 250 mL chemo infusion, , Intravenous, Clinic/HOD 1 time, 0 of 1 cycle  PACLitaxeL (TAXOL) 50 mg/m2 = 120 mg in sodium chloride 0.9% 250 mL chemo infusion, 50 mg/m2, Intravenous, Clinic/HOD 1 time, 0 of 1 cycle     12/29/2022 - 1/20/2023 Chemotherapy    Treatment Summary   Plan Name: [facility] [id number] ARM B CARBOPLATIN PACLITAXEL NIVOLUMAB  Treatment Goal: Curative  Status: Inactive  Start Date: 12/29/2022  End Date: 1/20/2023  Provider: Miki Medrano MD  Chemotherapy: CARBOplatin (PARAPLATIN) 215 mg in sodium chloride 0.9% 306.5 mL chemo infusion, 215 mg, Intravenous, Clinic/HOD 1 time, 4 of 5 cycles  Administration: 215 mg (12/29/2022), 230 mg (1/5/2023), 265 mg (1/13/2023), 265 mg (1/20/2023)  PACLitaxeL (TAXOL) 50 mg/m2 = 120 mg in sodium chloride 0.9% 250 mL chemo infusion, 50 mg/m2 = 120 mg, Intravenous, Clinic/HOD 1 time, 4 of 5 cycles  Dose modification: 50 mg/m2 (original dose 50 mg/m2, Cycle 4)  Administration: 120 mg (12/29/2022), 120 mg (1/5/2023), 120 mg (1/13/2023), 120 mg (1/20/2023)     12/29/2022 - 2/1/2023 Radiation Therapy    Treating physician: Dr. Javier Moulton    Course: C1 CHEST 2022    Treatment Site Ref.  ID Energy Dose/Fx (Gy) #Fx Dose Correction (Gy) Total Dose (Gy) Start Date End Date Elapsed Days   IM Esophagus [id number] 6X 1.8 23 / 23 0 41.4 12/29/2022 2/1/2023 34        3/17/2023 Surgery    Procedure: Procedure(s) (LRB):  XI ROBOTIC ESOPHAGECTOMY (N/A)  ROBOTIC JEJUNOSTOMY TUBE INSERTION (N/A)      Surgeon(s) and Role:     * Santana Brown Jr., MD - Primary     * Darian Murphy MD - Assisting     Assistance: Due to having no qualified resident during critical portions of the case, Dr. Darian Murphy acted as an assistant.     Pre-Operative Diagnosis: Esophageal adenocarcinoma, distal 1/3     Post-Operative Diagnosis: Same     Pre-Operative Variables:  Stage: T3 N0  Adjacent organ involvement: None  Chemotherapy within 90 Days: Yes  Radiation Therapy within 90 Days: Yes     Comorbidities:  Morbid obesity  Type 2 diabetes mellitus  Obstructive sleep apnea  Gout  Hyperparathyroidism  Hypertension  Severe obesity  Coronary artery disease  Heart failure with reduced ejection fraction    Procedure:  Robotic-assisted Vj three field esophagectomy  Regional mediastinal lymph node dissection  Botox injection of the pylorus  Jejunostomy tube placement     Operative Findings:                No evidence of distant intraperitoneal metastases in the chest or abdomen  Esophageal tumor located in the distal third of the esophagus, mild radiation changes appreciated.  Resection status:  R0 pending final pathology.  No gross disease remaining post dissection.  Frozen sections on proximal and distal margins negative for malignancy  100u Botox injection into the pylorus  Stapled esophagogastrostomy performed at the neck incision after confirmation of negative margins.  Jejunostomy tube placed      3/28/2023 Cancer Staged    Staging form: Esophagus - Adenocarcinoma, AJCC 8th Edition  - Pathologic stage from 3/28/2023: Stage II (ypT3, pN0, cM0, G2)     5/1/2023 - 5/1/2023 Chemotherapy    Treatment Summary   Plan Name: [facility] [id number]  ARM C ADJUVANT NIVOLUMAB  Treatment Goal: Curative  Status: Inactive  Start Date: 5/1/2023  End Date: 5/1/2023  Provider: Miki Medrano MD  Chemotherapy: [No matching medication found in this treatment plan]     5/29/2023 -  Chemotherapy    Treatment Summary   Plan Name: [facility] [id number] ARM C ADJUVANT NIVOLUMAB STEP 2  Treatment Goal: Curative  Status: Active  Start Date: 5/29/2023  End Date: 4/29/2024 (Planned)  Provider: Miki Medrano MD  Chemotherapy: [No matching medication found in this treatment plan]          Interim History:   Mr. Person returns to clinic today prior to cycle 6 of adjuvant nivolumab. He underwent robotic esophagectomy on 3/14/23 with Dr. Brown and J tube insertion.     He is feeling well. He saw ENT on 9/11/23 who scoped him.  He states his voice is a bit better.  Is swallowing well.  Considering vocal therapy/SLP consultation.  He continues to have some pruritis on his back.    Presents to clinic alone. ECOG 0.     Review of Systems   Constitutional:  Positive for weight loss. Negative for chills, diaphoresis, fever and malaise/fatigue.   HENT:  Negative for sore throat.    Eyes:  Negative for blurred vision and double vision.   Respiratory:  Negative for cough and shortness of breath.    Cardiovascular:  Negative for chest pain, palpitations and leg swelling.   Gastrointestinal:  Negative for abdominal pain, blood in stool, constipation, diarrhea, heartburn, nausea and vomiting.        Dysphagia   Genitourinary:  Negative for dysuria, frequency, hematuria and urgency.   Musculoskeletal:  Negative for back pain, falls, myalgias and neck pain.   Skin:  Positive for itching and rash.   Neurological:  Negative for dizziness, tingling, weakness and headaches.   Psychiatric/Behavioral:  The patient is not nervous/anxious.        Past Medical History:   Past Medical History:   Diagnosis Date    Anticoagulant long-term use     Diabetes mellitus     Onset late 50s/early 60s     Hyperlipidemia     Hypertension     Onset late 50s/early 60s    Kidney stones     Sleep apnea     since 2006      Past Surgical History:   Past Surgical History:   Procedure Laterality Date    CORONARY ANGIOGRAPHY N/A 9/30/2020    Procedure: ANGIOGRAM, CORONARY ARTERY;  Surgeon: John West MD;  Location: [facility] CATH LAB;  Service: Cardiology;  Laterality: N/A;    CYSTOSCOPY N/A 2/17/2022    Procedure: CYSTOSCOPY;  Surgeon: Lukasz Hughes MD;  [facility];  Service: Urology;  Laterality: N/A;    CYSTOSCOPY W/ URETERAL STENT PLACEMENT N/A 2/25/2022    Procedure: CYSTOSCOPY, WITH URETERAL STENT INSERTION;  Surgeon: Lukasz Hughes MD;  [facility];  Service: Urology;  Laterality: N/A;    ENDOSCOPIC ULTRASOUND OF UPPER GASTROINTESTINAL TRACT N/A 12/7/2022    Procedure: ULTRASOUND, UPPER GI TRACT, ENDOSCOPIC;  Surgeon: Darian Main MD;  Location: [facility] ENDO;  Service: Endoscopy;  Laterality: N/A;  Approval to hold Xarelto rec'd from Dr. Nick (see t/e 12/5/22)-DS    ESOPHAGOGASTRODUODENOSCOPY N/A 11/17/2022    Procedure: EGD (ESOPHAGOGASTRODUODENOSCOPY);  Surgeon: Brody Gonzales MD;  Location: [facility] (2ND FLR);  Service: Endoscopy;  Laterality: N/A;  inst via email-ok to hold Xarelto x 2 days-MS    ESOPHAGOGASTRODUODENOSCOPY N/A 5/31/2023    Procedure: EGD (ESOPHAGOGASTRODUODENOSCOPY) WITH DILATION;  Surgeon: Santana Brown Jr., MD;  [facility];  Service: General;  Laterality: N/A;    LASER LITHOTRIPSY Left 2/25/2022    Procedure: LITHOTRIPSY, USING LASER;  Surgeon: Lukasz Hughes MD;  [facility];  Service: Urology;  Laterality: Left;    LEFT HEART CATHETERIZATION Left 9/30/2020    Procedure: Left heart cath;  Surgeon: John West MD;  Location: [facility] CATH LAB;  Service: Cardiology;  Laterality: Left;    LITHOTRIPSY      PARATHYROIDECTOMY  1/1/2-107    PYELOSCOPY Left 2/25/2022    Procedure: PYELOSCOPY;  Surgeon: Lukasz AHRRIS  MD Saul;  [facility];  Service: Urology;  Laterality: Left;    ROBOT-ASSISTED SURGICAL REMOVAL OF ESOPHAGUS USING DA CARLOS XI N/A 3/14/2023    Procedure: XI ROBOTIC ESOPHAGECTOMY;  Surgeon: Santana Brown Jr., MD;  [facility];  Service: General;  Laterality: N/A;  Abdomen, Chest and Neck    ROBOTIC JEJUNOSTOMY N/A 3/14/2023    Procedure: ROBOTIC JEJUNOSTOMY TUBE INSERTION;  Surgeon: Santana Brown Jr., MD;  [facility];  Service: General;  Laterality: N/A;    TRANSESOPHAGEAL ECHOCARDIOGRAPHY N/A 4/7/2022    Procedure: ECHOCARDIOGRAM, TRANSESOPHAGEAL;  Surgeon: [facility] Diagnostic Provider;  Location: [facility] EP LAB;  Service: Cardiology;  Laterality: N/A;    TREATMENT OF CARDIAC ARRHYTHMIA N/A 4/7/2022    Procedure: Cardioversion or Defibrillation;  Surgeon: Iris Fisher NP;  Location: [facility] EP LAB;  Service: Cardiology;  Laterality: N/A;  afib, dccv, artie, anes, EH, 3prep    URETEROSCOPIC REMOVAL OF URETERIC CALCULUS Left 2/25/2022    Procedure: REMOVAL, CALCULUS, URETER, URETEROSCOPIC;  Surgeon: Lukasz Hughes MD;  Location: [facility] OR [address] [place] [address];  Service: Urology;  Laterality: Left;    URETEROSCOPY Left 2/25/2022    Procedure: URETEROSCOPY;  Surgeon: Lukasz Hughes MD;  Location: [address];  Service: Urology;  Laterality: Left;    VOCAL CORD INJECTION Left 3/17/2023    Procedure: INJECTION, VOCAL CORD, LARYNGOSCOPIC;  Surgeon: Gm Altman MD;  Location: [facility] OR [address] [place] [address];  Service: ENT;  Laterality: Left;      Family History:   Family History   Problem Relation Age of Onset    Arthritis Mother     Heart disease Father 70        CABG    Arthritis Father     Diabetes Father     Kidney disease Father         had one kidney removed in early thirties    Arthritis Sister     Arthritis Sister     Hypertension Sister     Colon cancer Brother 32    Arthritis Brother     Alcohol abuse Paternal Aunt     Cancer Maternal Grandfather         throat cancer    Diabetes  Paternal Grandmother     Colon polyps Paternal Grandfather     Colon cancer Paternal Grandfather     Cancer Paternal Grandfather         colon cancer at age 62      Social History:   Social History     Tobacco Use    Smoking status: Former     Current packs/day: 0.00     Average packs/day: 1 pack/day for 22.7 years (22.7 ttl pk-yrs)     Types: Cigarettes, Cigars     Start date: 1972     Quit date:      Years since quittin.3     Passive exposure: Never    Smokeless tobacco: Never    Tobacco comments:     smoking was off and on.  cumulative 7 years.  never more than three years in one stretch or more lj   Substance Use Topics    Alcohol use: Not Currently      I have reviewed and updated the patient's past medical, surgical, family and social histories.    Allergies:   Review of patient's allergies indicates:  No Known Allergies     Medications:   Current Outpatient Medications   Medication Sig Dispense Refill    allopurinoL (ZYLOPRIM) 100 MG tablet TAKE 1 TABLET BY MOUTH EVERY DAY 30 tablet 10    blood sugar diagnostic (ACCU-CHEK ANTONELLA PLUS TEST STRP) Strp Use to test CBG four times daily E11.65 400 strip 3    blood-glucose meter kit Checks blood sugars 1x/daily. 1 each 12    fenofibrate 160 MG Tab Take 1 tablet by mouth once daily.      glimepiride (AMARYL) 2 MG tablet Take 1 tablet by mouth every morning.      hydroCHLOROthiazide (HYDRODIURIL) 25 MG tablet Take 1 tablet (25 mg total) by mouth once daily. 30 tablet 11    lancets (ACCU-CHEK SOFTCLIX LANCETS) Misc Use to test CBG 4 times daily E11.65 400 each 1    lisinopriL-hydrochlorothiazide (PRINZIDE,ZESTORETIC) 20-25 mg Tab       metoprolol succinate (TOPROL-XL) 25 MG 24 hr tablet Take 0.5 tablets (12.5 mg total) by mouth once daily. 15 tablet 11    nitroGLYCERIN (NITROSTAT) 0.4 MG SL tablet Place 1 tablet (0.4 mg total) under the tongue every 5 (five) minutes as needed for Chest pain. If you need a third tablet, call 911 60 tablet 12    omeprazole  (PRILOSEC) 40 MG capsule Take 1 capsule (40 mg total) by mouth once daily. 90 capsule 3    rivaroxaban (XARELTO) 20 mg Tab Take 1 tablet (20 mg total) by mouth daily with dinner or evening meal. 90 tablet 3    rosuvastatin (CRESTOR) 20 MG tablet TAKE 1 TABLET(20 MG) BY MOUTH EVERY DAY (Patient taking differently: Take 20 mg by mouth every evening.) 30 tablet 11    tamsulosin (FLOMAX) 0.4 mg Cap Take 1 capsule (0.4 mg total) by mouth once daily. (Patient taking differently: Take 0.4 mg by mouth nightly.) 30 capsule 11    testosterone (ANDROGEL) 20.25 mg/1.25 gram (1.62 %) GlPm Apply 4 pumps to shoulders daily 2 each 5    testosterone cypionate (DEPOTESTOTERONE CYPIONATE) 200 mg/mL injection INJECT 2ML INTRAMUSCULARLY ONCE A MONTH AS DIRECTED      triamcinolone acetonide 0.1% (KENALOG) 0.1 % cream Apply topically 2 (two) times daily. 45 g 1     No current facility-administered medications for this visit.      Physical Exam:   /60 (BP Location: Left arm, Patient Position: Sitting, BP Method: Large (Automatic))   Pulse (!) 49   Temp 97.9 °F (36.6 °C) (Oral)   Resp 18   Ht 6' (1.829 m)   Wt 84.9 kg (187 lb 2.7 oz)   SpO2 98%   BMI 25.38 kg/m²      ECOG Performance status: 0    Physical Exam  Vitals reviewed.   Constitutional:       General: He is not in acute distress.     Appearance: Normal appearance. He is not ill-appearing, toxic-appearing or diaphoretic.   HENT:      Head: Normocephalic and atraumatic.      Right Ear: External ear normal.      Left Ear: External ear normal.      Nose: Nose normal. No congestion.      Mouth/Throat:      Pharynx: Oropharynx is clear.   Eyes:      General: No scleral icterus.     Extraocular Movements: Extraocular movements intact.      Conjunctiva/sclera: Conjunctivae normal.      Pupils: Pupils are equal, round, and reactive to light.   Neck:      Comments: L neck wound well-healed  Cardiovascular:      Rate and Rhythm: Normal rate and regular rhythm.      Heart sounds:  Normal heart sounds. No murmur heard.  Pulmonary:      Effort: Pulmonary effort is normal. No respiratory distress.      Breath sounds: Normal breath sounds. No wheezing or rales.   Abdominal:      General: Bowel sounds are normal. There is no distension.      Palpations: Abdomen is soft.      Tenderness: There is no abdominal tenderness.   Musculoskeletal:         General: No swelling.      Cervical back: Normal range of motion.   Lymphadenopathy:      Cervical: No cervical adenopathy.   Skin:     Coloration: Skin is not jaundiced.      Findings: Rash (grade I rash to upper chest/back.) present. No bruising or erythema.   Neurological:      General: No focal deficit present.      Mental Status: He is alert and oriented to person, place, and time. Mental status is at baseline.      Cranial Nerves: No cranial nerve deficit.      Motor: No weakness.      Gait: Gait normal.   Psychiatric:         Mood and Affect: Mood normal.         Behavior: Behavior normal.         Thought Content: Thought content normal.         Judgment: Judgment normal.         Labs:   Recent Results (from the past 48 hour(s))   CBC W/ AUTO DIFFERENTIAL    Collection Time: 09/18/23 10:36 AM   Result Value Ref Range    WBC 3.92 3.90 - 12.70 K/uL    RBC 4.70 4.60 - 6.20 M/uL    Hemoglobin 13.1 (L) 14.0 - 18.0 g/dL    Hematocrit 40.7 40.0 - 54.0 %    MCV 87 82 - 98 fL    MCH 27.9 27.0 - 31.0 pg    MCHC 32.2 32.0 - 36.0 g/dL    RDW 15.1 (H) 11.5 - 14.5 %    Platelets 138 (L) 150 - 450 K/uL    MPV 10.4 9.2 - 12.9 fL    Immature Granulocytes 0.3 0.0 - 0.5 %    Gran # (ANC) 2.7 1.8 - 7.7 K/uL    Immature Grans (Abs) 0.01 0.00 - 0.04 K/uL    Lymph # 0.6 (L) 1.0 - 4.8 K/uL    Mono # 0.4 0.3 - 1.0 K/uL    Eos # 0.2 0.0 - 0.5 K/uL    Baso # 0.03 0.00 - 0.20 K/uL    nRBC 0 0 /100 WBC    Gran % 69.8 38.0 - 73.0 %    Lymph % 14.3 (L) 18.0 - 48.0 %    Mono % 10.7 4.0 - 15.0 %    Eosinophil % 4.1 0.0 - 8.0 %    Basophil % 0.8 0.0 - 1.9 %    Differential Method  Automated        I have reviewed the pertinent labs from 23 which are notable for mild anemia, mild thrombocytopenia.      Imagin2022 - EUS  Impression:             - Esophageal mucosal changes consistent with Paredes's esophagus.   - Partially obstructing, malignant esophageal tumor was found in the lower third of the esophagus.   - Normal stomach.   - Normal examined duodenum.   - A mass was found in the lower third of the esophagus. A tissue diagnosis was obtained prior to this exam. This is of adenocarcinoma. This was staged T3 (based on invasion into) N0 Mx by endosonographic criteria.   - No specimens collected.     3/1/22 - PET CT   Impression:     1.  Decreased uptake in persistent distal esophageal thickening suggestive of partial response to therapy.  Previous non hypermetabolic gastrohepatic and perigastric lymph nodes are stable to slightly decreased in size.     2.  No new FDG avid lesions to suggest mixed response to therapy.     3.  Incidental new hypermetabolic gluteal cutaneous thickening, likely benign.  Recommend correlation with history and physical examination.    Path:   3/14/23:  Final Pathologic Diagnosis 1. LYMPH NODE, LEVEL 7, EXCISION:   One benign lymph node (0/1)   2. TOTAL THORACIC ESOPHAGECTOMY AND PROXIMAL STOMACH:   Adenocarcinoma, moderately differentiated, 3.0 cm   Margins are negative   Tumor invades adventitia   Extensive residual cancer with no evident tumor regression (poor or no   response, score 3)   Eleven benign lymph nodes (0/11)   3. RESIDUAL CONDUIT, EXCISION:   Negative for malignancy   SYNOPTIC REPORT   Procedure - Esophageal gastrectomy   Tumor site - GE Junction   Relationship of tumor to esophagogastric junction - Lesion is central at GE   junction   Tumor size - 3.0 x 3.1cm   Histologic type - Adenocarcinoma   Histologic grade - Moderately differentiated   Microscopic tumor extension - Tumor invades adventitia   Margins - All margins of resection  are uninvolved, proximal, distal and   adventitial margins are negative   Treatment effect - Extensive residual cancer with no evident tumor regression   (poor or no response, score 3)   Lymphovascular invasion - Not identified   Pathologic staging - yp T3 N0 Mx   Lymph nodes examined - 12   Lymph nodes involved - 0   Additional pathologic findings - Intestinal metaplasia present   MMR-IHC has been performed on previous biopsy ([id number]) and shows all 4   antibodies intact          Assessment:       1. Esophageal adenocarcinoma    2. Vocal cord paralysis    3. Hypertension associated with diabetes    4. Hyperlipidemia associated with type 2 diabetes mellitus    5. Type 2 diabetes mellitus with stage 3 chronic kidney disease, without long-term current use of insulin, unspecified whether stage 3a or 3b CKD    6. Type 2 diabetes mellitus with other diabetic kidney complication, without long-term current use of insulin    7. Obesity, diabetes, and hypertension syndrome    8. Coronary artery disease involving native coronary artery of native heart without angina pectoris    9. Paroxysmal atrial fibrillation    10. HFrEF (heart failure with reduced ejection fraction)    11. Dental infection    12. Rash          Plan:     # Esophageal adenocarcinoma   Mr. Person is a pleasant 67 year old male who presents to our clinic for management of esophageal cancer. He initially presented with dysphagia, and further workup confirmed a stage II adenocarcinoma, pMMR. Tumor staged T3N0Mx by endosonographic criteria, JEVON. CT CAP on 12/14/22 confirmed no evidence of metastatic disease.    Previously conversation regarding his diagnosis and treatment options.  Recommended perioperative chemo/radiation per the CROSS trial. Discussed chemoradiation for ~5 weeks with 5 doses of weekly carboplatin and taxol administered. Plan to obtain restaging scans 4-5 weeks after radiation completed.     He was a candidate for a cooperative group trial  assessing perioperative immunotherapy treatment. He consented for this study - ECOG-ACRIN CI1454: A Phase II/III Study of Dilcia-operative Nivolumab and Ipilimumab in Patients with Locoregional Esophageal and Gastroesophageal Junction Adenocarcinoma    Previously met with Dr. Santana Brown who agreed he was a surgical candidate.  Previously met with Dr. Javier Moulton who will be treating him with radiation.    Began cycle 1 carboplatin/paclitaxel/nivolumab on trial on 12/29/22.  Received cycle 4 of weekly chemotherapy on trial on 1/20/23.   We held chemotherapy for week 5 because of thrombocytopenia per protocol.    Completed radiation on 2/1/23.    Restaging PET/CT personally reviewed on 3/1/23 shows interval decreased FDG avidity in esophageal tumor, no new sites of disease.  CT CAP 3/2/23 shows stable esophageal thickening.    Underwent robotic esophagectomy on 3/14/23 with Dr. Brown.  Pathology showed negative margins, ypT3 N0 tumor with 0 of 12 lymph nodes involved.  No treatment effect was noted on the tumor tissue.    Discussed that per the [id number] protocol will proceed with randomization to adjuvant nivolumab +/- ipilimumab.  Patient randomized to receive adjuvant Nivolumab, started cycle 1 at 480 mg q4 weeks 5/1/23.  Plan for twelve months per protocol.    Tolerated very well.  Grade 1 pruritis.    Proceed with cycle 6 today at 480 mg q4 weeks.  Timeout occurred with myself and Maria Esther SORTO. Orders signed.    Underwent dilation with Dr. Brown on 5/31/23 with temporary improvement in dysphagia.  Weight down slightly but has stabilized.  PD-L1 CPS 30.    RTC in 4 weeks.    # Vocal cord paralysis  Post-op. S/p injection by ENT.  stable.  Last evaluated 9/11/23 by ENT with improvement.  No further interventions planned at this time.    # HTN, HLD, DM, CKD  Following with PCP Dr. Wilson and nephrologist Dr. Oconnell.   BP controlled in clinic today. Has been off his HCTZ/lisinopril because of prior  hypotension.  Cr stable.    # CAD, A fib, CHF  Following with cardiologist Dr. Nick & EP Dr. Phillip.   Hold Xarelto temporarily for EGD. Plans to switch to coumadin.   Continue medical management.     # Dental Infection  Continues with several procedures.  No active infection currently.  Should not interfere with nivolumab treatment.  Monitor.     # Rash  Grade I, very mild. Likely 2/2 immunotherapy.   Continue triamcinolone.  Continue to monitor.     Follow up: 4 weeks per research.    Patient is in agreement with the proposed treatment plan. All questions were answered to the patient's satisfaction. Pt knows to call clinic if anything is needed before the next clinic visit.    Miki Medrano MD  Hematology/Oncology  [facility]    Route Chart for Scheduling    Med Onc Chart Routing      Follow up with physician . Per research   Follow up with SAMUEL    Infusion scheduling note    Injection scheduling note    Labs    Imaging    Pharmacy appointment    Other referrals              Treatment Plan Information   [facility] [id number] ARM C ADJUVANT NIVOLUMAB STEP 2   Miki Medrano MD   Upcoming Treatment Dates - [facility] [id number] ARM C ADJUVANT NIVOLUMAB STEP 2    9/18/2023       Chemotherapy       INV nivolumab 480 mg in INV sodium chloride 0.9 % 100 mL chemo infusion  10/16/2023       Chemotherapy       INV nivolumab 480 mg in INV sodium chloride 0.9 % 100 mL chemo infusion  11/13/2023       Chemotherapy       INV nivolumab 480 mg in INV sodium chloride 0.9 % 100 mL chemo infusion  12/11/2023       Chemotherapy       INV nivolumab 480 mg in INV sodium chloride 0.9 % 100 mL chemo infusion    Supportive Plan Information  IV FLUIDS AND ELECTROLYTES   Miki Medrano MD   Upcoming Treatment Dates - IV FLUIDS AND ELECTROLYTES    No upcoming days in selected categories.    Therapy Plan Information  Flushes  heparin, porcine (PF) 100 unit/mL injection flush 500 Units  500 Units, Intravenous, Every  visit  sodium chloride 0.9% flush 10 mL  10 mL, Intravenous, Every visit

## 2023-09-25 ENCOUNTER — PATIENT MESSAGE (OUTPATIENT)
Dept: INTERNAL MEDICINE | Facility: CLINIC | Age: 68
End: 2023-09-25
Payer: MEDICARE

## 2023-09-25 ENCOUNTER — CLINICAL SUPPORT (OUTPATIENT)
Dept: REHABILITATION | Facility: OTHER | Age: 68
End: 2023-09-25
Payer: MEDICARE

## 2023-09-25 DIAGNOSIS — R26.89 IMBALANCE: Primary | ICD-10-CM

## 2023-09-25 DIAGNOSIS — R53.81 PHYSICAL DECONDITIONING: ICD-10-CM

## 2023-09-25 DIAGNOSIS — E78.2 MIXED HYPERLIPIDEMIA: ICD-10-CM

## 2023-09-25 PROCEDURE — 97112 NEUROMUSCULAR REEDUCATION: CPT | Mod: KX,PN,CQ

## 2023-09-25 NOTE — PROGRESS NOTES
[facility]   Physical Therapy Treatment Note      Name: Lukasz Person  Clinic Number: [id number]    Therapy Diagnosis:   Encounter Diagnoses   Name Primary?    Imbalance Yes    Physical deconditioning        Physician: Kirk Wilson MD    Visit Date: 9/25/2023    Physician Orders: PT Eval and Treat   Medical Diagnosis from Referral: Imbalance  Evaluation Date: 8/25/2023  Authorization Period Expiration: 07/19/2024  Plan of Care Expiration: 10/30/2023  Progress Note Due: 09/25/2023  Visit # / Visits authorized: 1(+1 Eval)/ 21   PTA Visit #: 1/5      Precautions: Standard, Fall, and Immunosuppression      Time In: 1100  Time Out: 1127  Total Billable Time: 27 minutes    Subjective   Patient reports falling a week ago and having pain along left lateral lower extremity.  Patient mentioned that he tripped when walking through a garden [place] shopping [place].  Patient stated he did not seek medical attention for the pain but that the pain has reduced some since the fall.  Pt verbalized that the pain increases occasionally with walking or moving the left leg.  Patient states having tenderness to touch along left lateral lower extremity.      Patient reports: feel like I'm improving in muscle strength and balance.  He was compliant with home exercise program.  Response to previous treatment: no adverse effects  Functional change: no change reported     Objective      Objective Measures updated at progress report unless specified.     Treatment     Lukasz received the treatments listed below:      therapeutic exercises to develop strength, endurance, ROM, flexibility, posture, and core stabilization for 0 minutes including:  NuStep 10 minutes (instruction on set up) seat @9 and arms at 11  Supine  +Bridges 30x  +Abimael SLR with 2# ankle weight 30x  Sidelying  +Clam bilateral 30x 0#     neuromuscular re-education activities to improve: Balance, Coordination, Kinesthetic, Sense, Proprioception, and Posture for  27 minutes. The following activities were included:  Standing:  +EC, FT firm surface head turns vert and horiz x 10 reps each  +EC, FT compliant surface head turns vert and horiz x 10 reps each; LOB on last rep of vertical HTs   +SLS on foam oval, hip flex, ext, abd x 10 reps x 2 sets 0 lbs  EC NBOS 2 x 30 sec   SLS 3 x 30 sec on each     therapeutic activities to improve functional performance for 00  minutes, including:     gait training to improve functional mobility and safety for 00  minutes, including:      Patient Education and Home Exercises       Education provided:   - patient educated on proper exercise technique.  Patient advised to make appointment to see physician in regards to leg pain.      Written Home Exercises Provided: yes. Exercises were reviewed and Lukasz was able to demonstrate them prior to the end of the session.  Lukasz demonstrated good  understanding of the education provided. See Electronic Medical Record under Patient Instructions for exercises provided during therapy sessions    Assessment   Patient arrived 8 minutes late to treatment and scheduled future appointments prior to beginning therapy today.  Exercise intensity decreased and limited 2* increased pain in left lower extremity. Patient tolerated treatment with little to no increase in pain.       Pt resented with very good tolerance to strengthening and balance activities.  Continue to progress with hip/core strengthening/stabilization program to tolerance and continue to change standing balance and posture with sensory integration in balance activities.    This is a 68 y.o. male with a medical diagnosis of        Encounter Diagnoses   Name Primary?    Physical deconditioning Yes    Imbalance     Patient presents with impaired balance. Lukasz will benefit from skilled physical therapy intervention to address the above impairments and functional limitations. Pt would benefit from general strengthening and conditioning while  including weightbearing balance activitites to improve safe functional mobility to meet the patient's goals for therapy.    Lukasz Is progressing well towards his goals.   Patient prognosis is Excellent.     Patient will continue to benefit from skilled outpatient physical therapy to address the deficits listed in the problem list box on initial evaluation, provide pt/family education and to maximize pt's level of independence in the home and community environment.     Patient's spiritual, cultural and educational needs considered and pt agreeable to plan of care and goals.     Anticipated barriers to physical therapy: saima    The following goals were discussed with the patient and patient is in agreement with them as to be addressed in the treatment plan.     Goals:  STG'S: 3 weeks  1. Patient independent in HEP of balance and head-eye coordination.  Exercises to be done Daily.  2. Patient able to perform forward bending without LOB or c/o dizziness  3. Pt to improve Hughes by 4-6 points for improved safety with functional mobility     LTG'S  : 6 weeks  1. Patient able to ambulate in community safely without assistive device, on varied terrainwith head movement, without LOB or c/o dizziness.  2. Pt to be I with self management of condition and progression vestibular program for maintenance.  3. Pt to improve Hughes by 6-10 points for improved safety with functional mobility    Plan   Cont to progress towards goals set by PT.        Moe Roman, PTA

## 2023-09-26 RX ORDER — ROSUVASTATIN CALCIUM 20 MG/1
20 TABLET, COATED ORAL NIGHTLY
Qty: 90 TABLET | Refills: 3 | Status: SHIPPED | OUTPATIENT
Start: 2023-09-26

## 2023-09-30 ENCOUNTER — HOSPITAL ENCOUNTER (OUTPATIENT)
Dept: RADIOLOGY | Facility: HOSPITAL | Age: 68
Discharge: HOME | End: 2023-09-30
Attending: INTERNAL MEDICINE
Payer: MEDICARE

## 2023-09-30 ENCOUNTER — OFFICE VISIT (OUTPATIENT)
Dept: INTERNAL MEDICINE | Facility: CLINIC | Age: 68
End: 2023-09-30
Payer: MEDICARE

## 2023-09-30 VITALS
TEMPERATURE: 98 F | DIASTOLIC BLOOD PRESSURE: 68 MMHG | HEIGHT: 72 IN | HEART RATE: 76 BPM | SYSTOLIC BLOOD PRESSURE: 122 MMHG | BODY MASS INDEX: 25.18 KG/M2 | WEIGHT: 185.88 LBS | RESPIRATION RATE: 12 BRPM

## 2023-09-30 DIAGNOSIS — M62.89 QUADRICEP TIGHTNESS: ICD-10-CM

## 2023-09-30 DIAGNOSIS — S46.012A TRAUMATIC TEAR OF LEFT ROTATOR CUFF, UNSPECIFIED TEAR EXTENT, INITIAL ENCOUNTER: ICD-10-CM

## 2023-09-30 DIAGNOSIS — M76.32 IT BAND SYNDROME, LEFT: ICD-10-CM
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
## 2023-09-30 DIAGNOSIS — E11.65 UNCONTROLLED TYPE 2 DIABETES MELLITUS WITH HYPERGLYCEMIA: ICD-10-CM

## 2023-09-30 PROCEDURE — 99999 PR PBB SHADOW E&M-EST. PATIENT-LVL V: CPT | Mod: PBBFAC,,, | Performed by: INTERNAL MEDICINE

## 2023-09-30 PROCEDURE — 99999 PR PBB SHADOW E&M-EST. PATIENT-LVL V: ICD-10-PCS | Mod: PBBFAC,,, | Performed by: INTERNAL MEDICINE

## 2023-09-30 PROCEDURE — 99214 PR OFFICE/OUTPT VISIT, EST, LEVL IV, 30-39 MIN: ICD-10-PCS | Mod: S$PBB,,, | Performed by: INTERNAL MEDICINE

## 2023-09-30 PROCEDURE — 73030 XR SHOULDER COMPLETE 2 OR MORE VIEWS LEFT: ICD-10-PCS | Mod: 26,LT,, | Performed by: RADIOLOGY

## 2023-09-30 PROCEDURE — 99214 OFFICE O/P EST MOD 30 MIN: CPT | Mod: S$PBB,,, | Performed by: INTERNAL MEDICINE

## 2023-09-30 PROCEDURE — 73030 X-RAY EXAM OF SHOULDER: CPT | Mod: 26,LT,, | Performed by: RADIOLOGY

## 2023-09-30 PROCEDURE — 73030 X-RAY EXAM OF SHOULDER: CPT | Mod: TC,PO,LT

## 2023-09-30 PROCEDURE — 99215 OFFICE O/P EST HI 40 MIN: CPT | Mod: PBBFAC,PO | Performed by: INTERNAL MEDICINE

## 2023-09-30 RX ORDER — TIZANIDINE 4 MG/1
4 TABLET ORAL EVERY 8 HOURS PRN
Qty: 90 TABLET | Refills: 0 | Status: SHIPPED | OUTPATIENT
Start: 2023-09-30 | End: 2023-10-30

## 2023-09-30 RX ORDER — TRAMADOL HYDROCHLORIDE 50 MG/1
50 TABLET ORAL EVERY 8 HOURS PRN
Qty: 21 TABLET | Refills: 0 | Status: SHIPPED | OUTPATIENT
Start: 2023-09-30 | End: 2023-11-21

## 2023-09-30 NOTE — PROGRESS NOTES
Subjective:       Patient ID: Lukasz Person is a 68 y.o. male.    Chief Complaint: Fall (Hip pain)    HPI    68-year-old male here for follow-up after a fall.  He fell walking home from dropping his car off.  He was cutting through some landscaping and fall on a shrub.  This happened Monday, Sept 18th.  He had his phone and wallet in his left pocket.  He hit flat on his thigh.  As soon as it happened, he felt the impression of his phone there.  His left shoulder has some pain in it.  He has not suffered loss of mobility of his shoulder.  It was ok the first week.  He was going to PT twice a week.  He canceled a PT session and yoga session.  He felt pretty good that Friday. He told the therapist what happened.  It was worse that afternoon and was even worse the next day.  He thinks he fell arm outstretched.     Review of Systems      Objective:      Physical Exam  Vitals reviewed.   Constitutional:       Appearance: He is well-developed.   HENT:      Head: Normocephalic and atraumatic.      Mouth/Throat:      Pharynx: No oropharyngeal exudate.   Eyes:      General: No scleral icterus.        Right eye: No discharge.         Left eye: No discharge.      Pupils: Pupils are equal, round, and reactive to light.   Neck:      Thyroid: No thyromegaly.      Trachea: No tracheal deviation.   Cardiovascular:      Rate and Rhythm: Normal rate and regular rhythm.      Heart sounds: Normal heart sounds. No murmur heard.     No friction rub. No gallop.   Pulmonary:      Effort: Pulmonary effort is normal. No respiratory distress.      Breath sounds: Normal breath sounds. No wheezing or rales.   Chest:      Chest wall: No tenderness.   Abdominal:      General: Bowel sounds are normal. There is no distension.      Palpations: Abdomen is soft. There is no mass.      Tenderness: There is no abdominal tenderness. There is no guarding or rebound.   Musculoskeletal:         General: No tenderness.      Right shoulder: Normal.      Left  shoulder: Decreased range of motion.        Arms:       Cervical back: Normal range of motion and neck supple.      Right knee: Normal.      Left knee: Normal.        Legs:    Skin:     General: Skin is warm and dry.      Coloration: Skin is not pale.      Findings: No erythema or rash.   Neurological:      Mental Status: He is alert and oriented to person, place, and time.   Psychiatric:         Behavior: Behavior normal.         Assessment:       1. Fall, initial encounter    2. Traumatic tear of left rotator cuff, unspecified tear extent, initial encounter  - X-Ray Shoulder 2 or More Views Left; Future  - MRI Shoulder Without Contrast Left; Future  - Ambulatory referral/consult to Orthopedics; Future    3. Quadricep tightness  - Ambulatory referral/consult to Physical/Occupational Therapy; Future    4. It band syndrome, left  - Ambulatory referral/consult to Physical/Occupational Therapy; Future      Plan:       1/2.  Check MRI left shoulder, x-ray left shoulder.  Refer to orthopedics.  Zanaflex prescribed.    3/4.  Refer to physical therapy.  Zanaflex as needed.    Patient has upcoming plane flight.  If unable to make the flight, will write note for patient.

## 2023-10-02 DIAGNOSIS — N40.1 BPH WITH OBSTRUCTION/LOWER URINARY TRACT SYMPTOMS: ICD-10-CM

## 2023-10-02 DIAGNOSIS — N13.8 BPH WITH OBSTRUCTION/LOWER URINARY TRACT SYMPTOMS: ICD-10-CM

## 2023-10-03 RX ORDER — TAMSULOSIN HYDROCHLORIDE 0.4 MG/1
0.4 CAPSULE ORAL
Qty: 30 CAPSULE | Refills: 2 | Status: SHIPPED | OUTPATIENT
Start: 2023-10-03 | End: 2024-01-18

## 2023-10-09 ENCOUNTER — LAB VISIT (OUTPATIENT)
Dept: LAB | Facility: HOSPITAL | Age: 68
End: 2023-10-09
Attending: HOSPITALIST
Payer: MEDICARE

## 2023-10-09 DIAGNOSIS — N20.0 NEPHROLITHIASIS: ICD-10-CM

## 2023-10-09 LAB
ALBUMIN SERPL BCP-MCNC: 4.1 G/DL (ref 3.5–5.2)
ANION GAP SERPL CALC-SCNC: 11 MMOL/L (ref 8–16)
BASOPHILS # BLD AUTO: 0.05 K/UL (ref 0–0.2)
BASOPHILS NFR BLD: 0.9 % (ref 0–1.9)
BUN SERPL-MCNC: 13 MG/DL (ref 8–23)
CALCIUM SERPL-MCNC: 9.8 MG/DL (ref 8.7–10.5)
CHLORIDE SERPL-SCNC: 103 MMOL/L (ref 95–110)
CO2 SERPL-SCNC: 28 MMOL/L (ref 23–29)
CREAT SERPL-MCNC: 1 MG/DL (ref 0.5–1.4)
DIFFERENTIAL METHOD: ABNORMAL
EOSINOPHIL # BLD AUTO: 0.2 K/UL (ref 0–0.5)
EOSINOPHIL NFR BLD: 2.9 % (ref 0–8)
ERYTHROCYTE [DISTWIDTH] IN BLOOD BY AUTOMATED COUNT: 15.1 % (ref 11.5–14.5)
EST. GFR  (NO RACE VARIABLE): >60 ML/MIN/1.73 M^2
GLUCOSE SERPL-MCNC: 136 MG/DL (ref 70–110)
HCT VFR BLD AUTO: 42.9 % (ref 40–54)
HGB BLD-MCNC: 13.1 G/DL (ref 14–18)
IMM GRANULOCYTES # BLD AUTO: 0.01 K/UL (ref 0–0.04)
IMM GRANULOCYTES NFR BLD AUTO: 0.2 % (ref 0–0.5)
LYMPHOCYTES # BLD AUTO: 0.5 K/UL (ref 1–4.8)
LYMPHOCYTES NFR BLD: 9.3 % (ref 18–48)
MCH RBC QN AUTO: 28 PG (ref 27–31)
MCHC RBC AUTO-ENTMCNC: 30.5 G/DL (ref 32–36)
MCV RBC AUTO: 92 FL (ref 82–98)
MONOCYTES # BLD AUTO: 0.5 K/UL (ref 0.3–1)
MONOCYTES NFR BLD: 9.4 % (ref 4–15)
NEUTROPHILS # BLD AUTO: 4.3 K/UL (ref 1.8–7.7)
NEUTROPHILS NFR BLD: 77.3 % (ref 38–73)
NRBC BLD-RTO: 0 /100 WBC
PHOSPHATE SERPL-MCNC: 3.5 MG/DL (ref 2.7–4.5)
PLATELET # BLD AUTO: 182 K/UL (ref 150–450)
PMV BLD AUTO: 10.9 FL (ref 9.2–12.9)
POTASSIUM SERPL-SCNC: 4.3 MMOL/L (ref 3.5–5.1)
RBC # BLD AUTO: 4.68 M/UL (ref 4.6–6.2)
SODIUM SERPL-SCNC: 142 MMOL/L (ref 136–145)
WBC # BLD AUTO: 5.61 K/UL (ref 3.9–12.7)

## 2023-10-09 PROCEDURE — 80069 RENAL FUNCTION PANEL: CPT | Performed by: HOSPITALIST

## 2023-10-09 PROCEDURE — 85025 COMPLETE CBC W/AUTO DIFF WBC: CPT | Performed by: HOSPITALIST

## 2023-10-09 PROCEDURE — 36415 COLL VENOUS BLD VENIPUNCTURE: CPT | Performed by: HOSPITALIST

## 2023-10-10 ENCOUNTER — OFFICE VISIT (OUTPATIENT)
Dept: NEPHROLOGY | Facility: CLINIC | Age: 68
End: 2023-10-10
Payer: MEDICARE

## 2023-10-10 VITALS
DIASTOLIC BLOOD PRESSURE: 53 MMHG | BODY MASS INDEX: 24.77 KG/M2 | HEIGHT: 73 IN | SYSTOLIC BLOOD PRESSURE: 117 MMHG | WEIGHT: 186.94 LBS | OXYGEN SATURATION: 98 % | HEART RATE: 76 BPM

## 2023-10-10 DIAGNOSIS — N20.0 NEPHROLITHIASIS: Primary | ICD-10-CM

## 2023-10-10 DIAGNOSIS — R31.29 MICROHEMATURIA: ICD-10-CM

## 2023-10-10 DIAGNOSIS — N18.2 STAGE 2 CHRONIC KIDNEY DISEASE: ICD-10-CM

## 2023-10-10 DIAGNOSIS — E11.59 OBESITY, DIABETES, AND HYPERTENSION SYNDROME: ICD-10-CM

## 2023-10-10 DIAGNOSIS — Z12.5 ENCOUNTER FOR SCREENING FOR MALIGNANT NEOPLASM OF PROSTATE: ICD-10-CM

## 2023-10-10 DIAGNOSIS — N40.1 BPH WITH URINARY OBSTRUCTION: ICD-10-CM

## 2023-10-10 DIAGNOSIS — I15.2 OBESITY, DIABETES, AND HYPERTENSION SYNDROME: ICD-10-CM

## 2023-10-10 DIAGNOSIS — E78.5 HYPERLIPIDEMIA ASSOCIATED WITH TYPE 2 DIABETES MELLITUS: Chronic | ICD-10-CM

## 2023-10-10 DIAGNOSIS — E11.29 TYPE 2 DIABETES MELLITUS WITH OTHER DIABETIC KIDNEY COMPLICATION, WITHOUT LONG-TERM CURRENT USE OF INSULIN: Chronic | ICD-10-CM

## 2023-10-10 DIAGNOSIS — E66.9 OBESITY, DIABETES, AND HYPERTENSION SYNDROME: ICD-10-CM

## 2023-10-10 DIAGNOSIS — E11.69 HYPERLIPIDEMIA ASSOCIATED WITH TYPE 2 DIABETES MELLITUS: Chronic | ICD-10-CM

## 2023-10-10 DIAGNOSIS — I10 ESSENTIAL HYPERTENSION: Chronic | ICD-10-CM

## 2023-10-10 DIAGNOSIS — E11.69 OBESITY, DIABETES, AND HYPERTENSION SYNDROME: ICD-10-CM

## 2023-10-10 DIAGNOSIS — I48.0 PAROXYSMAL ATRIAL FIBRILLATION: ICD-10-CM

## 2023-10-10 DIAGNOSIS — N13.8 BPH WITH URINARY OBSTRUCTION: ICD-10-CM

## 2023-10-10 DIAGNOSIS — E21.0 HYPERPARATHYROIDISM, PRIMARY: ICD-10-CM

## 2023-10-10 PROCEDURE — 99999 PR PBB SHADOW E&M-EST. PATIENT-LVL IV: CPT | Mod: PBBFAC,,, | Performed by: HOSPITALIST

## 2023-10-10 PROCEDURE — 99999 PR PBB SHADOW E&M-EST. PATIENT-LVL IV: ICD-10-PCS | Mod: PBBFAC,,, | Performed by: HOSPITALIST

## 2023-10-10 PROCEDURE — 99215 OFFICE O/P EST HI 40 MIN: CPT | Mod: S$PBB,,, | Performed by: HOSPITALIST

## 2023-10-10 PROCEDURE — 99214 OFFICE O/P EST MOD 30 MIN: CPT | Mod: PBBFAC | Performed by: HOSPITALIST

## 2023-10-10 PROCEDURE — 99215 PR OFFICE/OUTPT VISIT, EST, LEVL V, 40-54 MIN: ICD-10-PCS | Mod: S$PBB,,, | Performed by: HOSPITALIST

## 2023-10-10 RX ORDER — POTASSIUM CITRATE AND CITRIC ACID MONOHYDRATE 1100; 334 MG/5ML; MG/5ML
10 SOLUTION ORAL 2 TIMES DAILY
Qty: 600 ML | Refills: 5 | Status: SHIPPED | OUTPATIENT
Start: 2023-10-10 | End: 2023-10-11 | Stop reason: SDUPTHER

## 2023-10-10 NOTE — PROGRESS NOTES
REASON FOR CONSULT/CHIEF COMPLAIN:  H/o Chronic kidney disease stage 3    REFERRING PHYSICIAN: Pallavi Nick      HISTORY OF PRESENT ILLNESS: 68 y.o. male who is established to me  has a past medical history of Anticoagulant long-term use, Diabetes mellitus, Hyperlipidemia, Hypertension, Kidney stones, and Sleep apnea. H/o kidney stones in the past, hyperparathyroidism s/p parathyroidectomy, gout, KUMAR referred here for abnormal renal function with creatinine at 1.6 and CKD stage 3 A. Pt has been following with Nephrologist at [place] , mentioned has calcium oxalate stones in the past and since his parathyroidectomy ( one of 4 glands removed), and being on potassium citrate he never had any episode since past 3 years. Pt has been compliant with drinking good amount of water, 64 ounces per day.Has been Diabetic since past 10 years  And well controlled. Recent urine micro albumin checked was with in normal limits. Pt already on ACE for his blood pressure. Also had elevated uric acid for which he is on allopurinol.    Today he mentions of not taking his blood pressure pills this AM and his pressure is slightly high.   No chronic NSAID use, no known exposure to lithium, lead.  Denied any issues with urination including dysuria, hematuria, urgency, hesitancy, nocturia, incomplete voiding. Denied chest pain, nausea, vomiting, abd pain, nausea, vomiting, diarrhea, shortness of breath, pedal edema, Orthopnea, PND.  Home Blood pressures are checked/not checked and stay around  130 to 135 systolic and 80 diastolic   Home Blood sugars are checked/not checked and well controlled.     Interval H/o  9/13   Pt mentions of hospitalization in Feb with fevers, chills , pyelonephritis, and kidney stone with s/p L nephrolithotripsy and ureteral stent placement on 2/25 also had  N/V, and left-sided flank pain. UA consistent with UTI.  Blood cultures negative. Pt received Rocephin IV. His stent was removed late March 2022. Repeat  renal ultrasound with right small stone with no hydronephrosis or obstruction.     Interval H/o 10/10/23   No recent kidney stone episodes. Denies nausea, vomiting, flank pain . UA with 1+ blood and RBC. Has been off potassium citrate for past few weeks .  Continues to get Immunotherapy once a month. Stone analysis with calcium oxide monohydrate stones.       ROS:  General: negative for chills, or fatigue  ENT: No epistaxis or headaches  Hematological and Lymphatic: No bleeding problems or blood clots.  Endocrine: No skin changes or temperature intolerance  Respiratory: No cough, shortness of breath, or wheezing  Cardiovascular: No chest pain or dyspnea   Gastrointestinal: No abdominal pain, change in bowel habits  Genito-Urinary: No dysuria, trouble voiding, or hematuria  Musculoskeletal: ROS: negative for - joint pain, joint stiffness, joint swelling, muscle pain or muscular weakness  Neurological: No new focal weakness, no numbness  Dermatological: No rash or ulcers.    PAST MEDICAL HISTORY:  Past Medical History:   Diagnosis Date    Anticoagulant long-term use     Diabetes mellitus     Onset late 50s/early 60s    Hyperlipidemia     Hypertension     Onset late 50s/early 60s    Kidney stones     Sleep apnea     since 2006       PAST SURGICAL HISTORY:  Past Surgical History:   Procedure Laterality Date    CORONARY ANGIOGRAPHY N/A 9/30/2020    Procedure: ANGIOGRAM, CORONARY ARTERY;  Surgeon: John West MD;  Location: [facility] CATH LAB;  Service: Cardiology;  Laterality: N/A;    CYSTOSCOPY N/A 2/17/2022    Procedure: CYSTOSCOPY;  Surgeon: Lukasz Hughes MD;  Location: [facility] OR [address] [place] [address];  Service: Urology;  Laterality: N/A;    CYSTOSCOPY W/ URETERAL STENT PLACEMENT N/A 2/25/2022    Procedure: CYSTOSCOPY, WITH URETERAL STENT INSERTION;  Surgeon: Lukasz Hughes MD;  Location: [facility] OR [address] [place] [address];  Service: Urology;  Laterality: N/A;    ENDOSCOPIC ULTRASOUND OF UPPER GASTROINTESTINAL TRACT N/A 12/7/2022     Procedure: ULTRASOUND, UPPER GI TRACT, ENDOSCOPIC;  Surgeon: Darian Main MD;  Location: [facility] ENDO;  Service: Endoscopy;  Laterality: N/A;  Approval to hold Xarelto rec'd from Dr. Nick (see t/e 12/5/22)-DS    ESOPHAGOGASTRODUODENOSCOPY N/A 11/17/2022    Procedure: EGD (ESOPHAGOGASTRODUODENOSCOPY);  Surgeon: Brody Gonzales MD;  Location: [facility] ENDO (2ND FLR);  Service: Endoscopy;  Laterality: N/A;  inst via email-ok to hold Xarelto x 2 days-MS    ESOPHAGOGASTRODUODENOSCOPY N/A 5/31/2023    Procedure: EGD (ESOPHAGOGASTRODUODENOSCOPY) WITH DILATION;  Surgeon: Santana Brown Jr., MD;  Location: [facility] OR 2ND FLR;  Service: General;  Laterality: N/A;    LASER LITHOTRIPSY Left 2/25/2022    Procedure: LITHOTRIPSY, USING LASER;  Surgeon: Lukasz Hughes MD;  Location: [facility] OR 1ST FLR;  Service: Urology;  Laterality: Left;    LEFT HEART CATHETERIZATION Left 9/30/2020    Procedure: Left heart cath;  Surgeon: John West MD;  Location: [facility] CATH LAB;  Service: Cardiology;  Laterality: Left;    LITHOTRIPSY      PARATHYROIDECTOMY  1/1/2-107    PYELOSCOPY Left 2/25/2022    Procedure: PYELOSCOPY;  Surgeon: Lukasz Hughes MD;  Location: [facility] OR 1ST FLR;  Service: Urology;  Laterality: Left;    ROBOT-ASSISTED SURGICAL REMOVAL OF ESOPHAGUS USING DA CARLOS XI N/A 3/14/2023    Procedure: XI ROBOTIC ESOPHAGECTOMY;  Surgeon: Santana Brown Jr., MD;  [facility];  Service: General;  Laterality: N/A;  Abdomen, Chest and Neck    ROBOTIC JEJUNOSTOMY N/A 3/14/2023    Procedure: ROBOTIC JEJUNOSTOMY TUBE INSERTION;  Surgeon: Santana Brown Jr., MD;  Location: [facility] OR 2ND FLR;  Service: General;  Laterality: N/A;    TRANSESOPHAGEAL ECHOCARDIOGRAPHY N/A 4/7/2022    Procedure: ECHOCARDIOGRAM, TRANSESOPHAGEAL;  Surgeon: [facility] Diagnostic Provider;  Location: [facility] EP LAB;  Service: Cardiology;  Laterality: N/A;    TREATMENT OF CARDIAC ARRHYTHMIA N/A 4/7/2022    Procedure: Cardioversion or Defibrillation;   Surgeon: Iris Fisher NP;  Location: [facility] EP LAB;  Service: Cardiology;  Laterality: N/A;  afib, dccv, artie, anes, EH, 3prep    URETEROSCOPIC REMOVAL OF URETERIC CALCULUS Left 2022    Procedure: REMOVAL, CALCULUS, URETER, URETEROSCOPIC;  Surgeon: Lukasz Hughes MD;  Location: [facility] OR [facility] FLR;  Service: Urology;  Laterality: Left;    URETEROSCOPY Left 2022    Procedure: URETEROSCOPY;  Surgeon: Lukasz Hughes MD;  Location: [facility] OR [facility] FLR;  Service: Urology;  Laterality: Left;    VOCAL CORD INJECTION Left 3/17/2023    Procedure: INJECTION, VOCAL CORD, LARYNGOSCOPIC;  Surgeon: Gm Altman MD;  Location: [facility] OR 2ND FLR;  Service: ENT;  Laterality: Left;       FAMILY HISTORY:   Family History   Problem Relation Age of Onset    Arthritis Mother     Heart disease Father 70        CABG    Arthritis Father     Diabetes Father     Kidney disease Father         had one kidney removed in early thirties    Arthritis Sister     Arthritis Sister     Hypertension Sister     Colon cancer Brother 32    Arthritis Brother     Alcohol abuse Paternal Aunt     Cancer Maternal Grandfather         throat cancer    Diabetes Paternal Grandmother     Colon polyps Paternal Grandfather     Colon cancer Paternal Grandfather     Cancer Paternal Grandfather         colon cancer at age 62       SOCIAL HISTORY:  Social History     Socioeconomic History    Marital status:      Spouse name: Amanda   Tobacco Use    Smoking status: Former     Current packs/day: 0.00     Average packs/day: 1 pack/day for 22.7 years (22.7 ttl pk-yrs)     Types: Cigarettes, Cigars     Start date: 1972     Quit date:      Years since quittin.4     Passive exposure: Never    Smokeless tobacco: Never    Tobacco comments:     smoking was off and on.  cumulative 7 years.  never more than three years in one stretch or more lj   Substance and Sexual Activity    Alcohol use: Not Currently    Drug use: Not Currently     Types:  Marijuana    Sexual activity: Not Currently     Partners: Female     Birth control/protection: None     Comment:      Social Determinants of Health     Financial Resource Strain: Low Risk  (10/10/2023)    Overall Financial Resource Strain (CARDIA)     Difficulty of Paying Living Expenses: Not very hard   Food Insecurity: No Food Insecurity (10/10/2023)    Hunger Vital Sign     Worried About Running Out of Food in the Last Year: Never true     Ran Out of Food in the Last Year: Never true   Recent Concern: Food Insecurity - Food Insecurity Present (8/18/2023)    Hunger Vital Sign     Worried About Running Out of Food in the Last Year: Never true     Ran Out of Food in the Last Year: Sometimes true   Transportation Needs: No Transportation Needs (10/10/2023)    PRAPARE - Transportation     Lack of Transportation (Medical): No     Lack of Transportation (Non-Medical): No   Physical Activity: Unknown (10/10/2023)    Exercise Vital Sign     Days of Exercise per Week: Patient refused     Minutes of Exercise per Session: 30 min   Recent Concern: Physical Activity - Insufficiently Active (8/18/2023)    Exercise Vital Sign     Days of Exercise per Week: 5 days     Minutes of Exercise per Session: 20 min   Stress: No Stress Concern Present (10/10/2023)    [employer] [employer] - Occupational Stress Questionnaire     Feeling of Stress : Only a little   Social Connections: Unknown (10/10/2023)    Social Connection and Isolation Panel [NHANES]     Frequency of Communication with Friends and Family: Once a week     Frequency of Social Gatherings with Friends and Family: Once a week     Active Member of Clubs or Organizations: Yes     Attends Club or Organization Meetings: More than 4 times per year     Marital Status:    Housing Stability: Low Risk  (10/10/2023)    Housing Stability Vital Sign     Unable to Pay for Housing in the Last Year: No     Number of Places Lived in the Last Year: 1      Unstable Housing in the Last Year: No       ALLERGIES:  Review of patient's allergies indicates:  No Known Allergies    MEDICATIONS:    Current Outpatient Medications:     allopurinoL (ZYLOPRIM) 100 MG tablet, TAKE 1 TABLET BY MOUTH EVERY DAY, Disp: 30 tablet, Rfl: 10    blood sugar diagnostic (ACCU-CHEK ANTONELLA PLUS TEST STRP) Strp, Use to test CBG four times daily E11.65, Disp: 400 strip, Rfl: 3    blood-glucose meter kit, Checks blood sugars 1x/daily., Disp: 1 each, Rfl: 12    hydroCHLOROthiazide (HYDRODIURIL) 25 MG tablet, Take 1 tablet (25 mg total) by mouth once daily., Disp: 30 tablet, Rfl: 11    lancets (ACCU-CHEK SOFTCLIX LANCETS) Misc, Use to test CBG 4 times daily E11.65, Disp: 400 each, Rfl: 1    metoprolol succinate (TOPROL-XL) 25 MG 24 hr tablet, Take 0.5 tablets (12.5 mg total) by mouth once daily., Disp: 15 tablet, Rfl: 11    nitroGLYCERIN (NITROSTAT) 0.4 MG SL tablet, Place 1 tablet (0.4 mg total) under the tongue every 5 (five) minutes as needed for Chest pain. If you need a third tablet, call 911, Disp: 60 tablet, Rfl: 12    omeprazole (PRILOSEC) 40 MG capsule, Take 1 capsule (40 mg total) by mouth once daily., Disp: 90 capsule, Rfl: 3    rivaroxaban (XARELTO) 20 mg Tab, Take 1 tablet (20 mg total) by mouth daily with dinner or evening meal., Disp: 90 tablet, Rfl: 3    rosuvastatin (CRESTOR) 20 MG tablet, Take 1 tablet (20 mg total) by mouth every evening., Disp: 90 tablet, Rfl: 3    tamsulosin (FLOMAX) 0.4 mg Cap, TAKE 1 CAPSULE(0.4 MG) BY MOUTH EVERY DAY, Disp: 30 capsule, Rfl: 2    testosterone (ANDROGEL) 20.25 mg/1.25 gram (1.62 %) GlPm, Apply 4 pumps to shoulders daily, Disp: 2 each, Rfl: 5    testosterone cypionate (DEPOTESTOTERONE CYPIONATE) 200 mg/mL injection, INJECT 2ML INTRAMUSCULARLY ONCE A MONTH AS DIRECTED, Disp: , Rfl:     tiZANidine (ZANAFLEX) 4 MG tablet, Take 1 tablet (4 mg total) by mouth every 8 (eight) hours as needed., Disp: 90 tablet, Rfl: 0    traMADoL (ULTRAM) 50 mg tablet,  Take 1 tablet (50 mg total) by mouth every 8 (eight) hours as needed for Pain., Disp: 21 tablet, Rfl: 0    triamcinolone acetonide 0.1% (KENALOG) 0.1 % cream, Apply topically 2 (two) times daily., Disp: 45 g, Rfl: 1    citric acid-potassium citrate (POLYCITRA) 1,100-334 mg/5 mL solution, Take 10 mLs (20 mEq total) by mouth 2 (two) times a day., Disp: 600 mL, Rfl: 5   Medication List with Changes/Refills   New Medications    CITRIC ACID-POTASSIUM CITRATE (POLYCITRA) 1,100-334 MG/5 ML SOLUTION    Take 10 mLs (20 mEq total) by mouth 2 (two) times a day.   Current Medications    ALLOPURINOL (ZYLOPRIM) 100 MG TABLET    TAKE 1 TABLET BY MOUTH EVERY DAY    BLOOD SUGAR DIAGNOSTIC (ACCU-CHEK ANTONELLA PLUS TEST STRP) STRP    Use to test CBG four times daily E11.65    BLOOD-GLUCOSE METER KIT    Checks blood sugars 1x/daily.    HYDROCHLOROTHIAZIDE (HYDRODIURIL) 25 MG TABLET    Take 1 tablet (25 mg total) by mouth once daily.    LANCETS (ACCU-CHEK SOFTCLIX LANCETS) MISC    Use to test CBG 4 times daily E11.65    METOPROLOL SUCCINATE (TOPROL-XL) 25 MG 24 HR TABLET    Take 0.5 tablets (12.5 mg total) by mouth once daily.    NITROGLYCERIN (NITROSTAT) 0.4 MG SL TABLET    Place 1 tablet (0.4 mg total) under the tongue every 5 (five) minutes as needed for Chest pain. If you need a third tablet, call 911    OMEPRAZOLE (PRILOSEC) 40 MG CAPSULE    Take 1 capsule (40 mg total) by mouth once daily.    RIVAROXABAN (XARELTO) 20 MG TAB    Take 1 tablet (20 mg total) by mouth daily with dinner or evening meal.    ROSUVASTATIN (CRESTOR) 20 MG TABLET    Take 1 tablet (20 mg total) by mouth every evening.    TAMSULOSIN (FLOMAX) 0.4 MG CAP    TAKE 1 CAPSULE(0.4 MG) BY MOUTH EVERY DAY    TESTOSTERONE (ANDROGEL) 20.25 MG/1.25 GRAM (1.62 %) GLPM    Apply 4 pumps to shoulders daily    TESTOSTERONE CYPIONATE (DEPOTESTOTERONE CYPIONATE) 200 MG/ML INJECTION    INJECT 2ML INTRAMUSCULARLY ONCE A MONTH AS DIRECTED    TIZANIDINE (ZANAFLEX) 4 MG TABLET    Take 1  "tablet (4 mg total) by mouth every 8 (eight) hours as needed.    TRAMADOL (ULTRAM) 50 MG TABLET    Take 1 tablet (50 mg total) by mouth every 8 (eight) hours as needed for Pain.    TRIAMCINOLONE ACETONIDE 0.1% (KENALOG) 0.1 % CREAM    Apply topically 2 (two) times daily.        PHYSICAL EXAM:  BP (!) 117/53 (BP Location: Right arm, Patient Position: Sitting, BP Method: Medium (Automatic))   Pulse 76   Ht 6' 1" (1.854 m)   Wt 84.8 kg (186 lb 15.2 oz)   SpO2 98%   BMI 24.67 kg/m²     General: No distress,  Moderate build ,No fever or chills  Head: Normocephalic,atraumatic  Eyes: conjunctivae/corneas clear. PERRL, EOM's intact.  Nose: Nares normal. Mucosa normal. No drainage or sinus tenderness.  Neck: No adenopathy,no carotid bruit,no JVD  Lungs:Clear to auscultation bilaterally, No Crackles  Heart: Regular rate and rhythm, no murmur, gallops or rubs  Abdomen: Soft, no tenderness, bowel sounds normal  Extremities: Atraumatic, no edema in LE  Skin: Turgor normal. No rashes or ulcers  Neurologic: No focal weakness, oriented.  Dialysis Access: none         LABS:  Lab Results   Component Value Date    HGB 13.1 (L) 10/09/2023        Lab Results   Component Value Date    CREATININE 1.0 10/09/2023       Prot/Creat Ratio, Urine   Date Value Ref Range Status   10/09/2023 0.08 0.00 - 0.20 Final   10/04/2022 Unable to calculate 0.00 - 0.20 Final   09/07/2022 0.10 0.00 - 0.20 Final       Lab Results   Component Value Date     10/09/2023    K 4.3 10/09/2023    CO2 28 10/09/2023       last PTH   Lab Results   Component Value Date    PTH 25.4 02/15/2022    CALCIUM 9.8 10/09/2023    PHOS 3.5 10/09/2023       Lab Results   Component Value Date    HGBA1C 6.2 (H) 05/29/2023       Lab Results   Component Value Date    LDLCALC 29.4 (L) 11/07/2022         Creatinine, Urine   Date Value Ref Range Status   10/09/2023 253.0 23.0 - 375.0 mg/dL Final   10/04/2022 35.0 23.0 - 375.0 mg/dL Final   09/07/2022 178.0 23.0 - 375.0 mg/dL " Final       Protein, Urine Random   Date Value Ref Range Status   10/09/2023 20 (H) 0 - 15 mg/dL Final   10/04/2022 <7 0 - 15 mg/dL Final   09/07/2022 18 (H) 0 - 15 mg/dL Final       Prot/Creat Ratio, Urine   Date Value Ref Range Status   10/09/2023 0.08 0.00 - 0.20 Final   10/04/2022 Unable to calculate 0.00 - 0.20 Final   09/07/2022 0.10 0.00 - 0.20 Final          Problem List   Chronic kidney disease stage 2, eGFR > 60 ml/min   Diabetes Mellitus   Essential hypertension  Hyperlipidemia  H/o kidney stones / calcium oxalate monohydrate   Esophageal cancer s/p Immunotherapy    ASSESSMENT and Plan      CKD stage 2, eGFR > 60 ml/min secondary to parenchymal damage from multiple nephrolithiasis vs long standing HTN   -Baseline Creatinine is around 1.0-1.5 mg/dL and is stable currently at 1.0 mg/dl   Pt is currently off Fenofibrate with mild improvement in serum creatinine levels from 1.5-1.0 mg/dl  -Recent stone episode with s/p L nephrolithotripsy and ureteral stent placement on 2/25 with UTI and perinephric stranding  - S/p stent removal March 2022  -Stone analysis with 100 % calcium oxalate monohydrate    -Pt is currently off Potassium citrate, however given his microhematuria on UA, asked him to start taking it back again at K- citrate 10 meq BID   Goal to increase urine pH to 6-6.5  -Most likely etiology for CKD is  DM, HTN, arterial atherosclerosis  Urine micro albumin to creatinine ratio is with in limits   Electrolytes, Acid Base, Hemoglobin, Bone Mineral in acceptable range.  Checking PTH  this visit  S/p parathyroidectomy.  Avoid NSAIDs intake  Currently off fenofibrate and need to check lipid panel    Diabetes Mellitus   Continue current medications      Essential Hypertension   Blood pressures controlled    Encouraged home Bp monitor    -Continue current BP meds regimen  -Avoid NSAIDs intake     H/o kidney stones    S/p Parathyroidectomy    Continue Potassium citrate 10 meq TID    No recent stones episodes  since 2/2022    Pt is also on HCTZ.     H/o Gout   On allopurinol   No arthritis       - General risk factors for kidney stones and the conservative measures to prevent kidney stones in the future were discussed with the patient in detail.  The patient was encouraged to drink 2-3 liters of water a day, limit iced tea and pasquale as well as foods high in oxalate.  They were cautioned to try to limit salt and red meat intake.  We also discussed adding citrate to the diet with the addition of atul or lemon juice to their water or alternatively with crystal light.      45 minutes of total time spent on the encounter, which includes face to face time and non-face to face time preparing to see the patient (eg, review of tests), Obtaining and/or reviewing separately obtained history, documenting clinical information in the electronic or other health record, independently interpreting results (not separately reported) and communicating results to the patient/family/caregiver, or Care coordination (not separately reported).       RTC in 4 months     Diagnosis and plan of care explained to the patient.  Pt Verbalized understanding. Answered all questions.  Thanks for allowing me to participate in the care of this patient.       Karol Oconnell MD  Nephrology  [facility].

## 2023-10-11 ENCOUNTER — HOSPITAL ENCOUNTER (OUTPATIENT)
Dept: RADIOLOGY | Facility: HOSPITAL | Age: 68
Discharge: HOME | End: 2023-10-11
Attending: INTERNAL MEDICINE
Payer: MEDICARE

## 2023-10-11 DIAGNOSIS — R31.29 MICROHEMATURIA: ICD-10-CM

## 2023-10-11 DIAGNOSIS — N20.0 NEPHROLITHIASIS: ICD-10-CM

## 2023-10-11 DIAGNOSIS — S46.012A TRAUMATIC TEAR OF LEFT ROTATOR CUFF, UNSPECIFIED TEAR EXTENT, INITIAL ENCOUNTER: ICD-10-CM

## 2023-10-11 PROCEDURE — 73221 MRI SHOULDER WITHOUT CONTRAST LEFT: ICD-10-PCS | Mod: 26,LT,, | Performed by: RADIOLOGY

## 2023-10-11 PROCEDURE — 73221 MRI JOINT UPR EXTREM W/O DYE: CPT | Mod: 26,LT,, | Performed by: RADIOLOGY

## 2023-10-11 PROCEDURE — 73221 MRI JOINT UPR EXTREM W/O DYE: CPT | Mod: TC,LT

## 2023-10-11 RX ORDER — POTASSIUM CITRATE AND CITRIC ACID MONOHYDRATE 1100; 334 MG/5ML; MG/5ML
10 SOLUTION ORAL 2 TIMES DAILY
Qty: 600 ML | Refills: 5 | Status: SHIPPED | OUTPATIENT
Start: 2023-10-11 | End: 2024-10-10

## 2023-10-13 ENCOUNTER — HOSPITAL ENCOUNTER (OUTPATIENT)
Dept: RADIOLOGY | Facility: HOSPITAL | Age: 68
Discharge: HOME | End: 2023-10-13
Attending: INTERNAL MEDICINE
Payer: MEDICARE

## 2023-10-13 ENCOUNTER — PATIENT MESSAGE (OUTPATIENT)
Dept: HEMATOLOGY/ONCOLOGY | Facility: CLINIC | Age: 68
End: 2023-10-13

## 2023-10-13 ENCOUNTER — CLINICAL SUPPORT (OUTPATIENT)
Dept: REHABILITATION | Facility: OTHER | Age: 68
End: 2023-10-13
Payer: MEDICARE

## 2023-10-13 DIAGNOSIS — Z00.6 CLINICAL TRIAL PARTICIPANT: ICD-10-CM

## 2023-10-13 DIAGNOSIS — R26.89 IMBALANCE: Primary | ICD-10-CM

## 2023-10-13 DIAGNOSIS — R53.81 PHYSICAL DECONDITIONING: ICD-10-CM

## 2023-10-13 DIAGNOSIS — C15.9 ESOPHAGEAL ADENOCARCINOMA: ICD-10-CM

## 2023-10-13 DIAGNOSIS — S46.012A TRAUMATIC TEAR OF LEFT ROTATOR CUFF, UNSPECIFIED TEAR EXTENT, INITIAL ENCOUNTER: ICD-10-CM

## 2023-10-13 PROCEDURE — 74177 CT ABD & PELVIS W/CONTRAST: CPT | Mod: 26,Q1,, | Performed by: RADIOLOGY

## 2023-10-13 PROCEDURE — 71260 CT CHEST ABDOMEN PELVIS WITH CONTRAST (XPD): ICD-10-PCS | Mod: 26,Q1,, | Performed by: RADIOLOGY

## 2023-10-13 PROCEDURE — 25500020 PHARM REV CODE 255: Mod: Q1 | Performed by: INTERNAL MEDICINE

## 2023-10-13 PROCEDURE — A9698 NON-RAD CONTRAST MATERIALNOC: HCPCS | Performed by: INTERNAL MEDICINE

## 2023-10-13 PROCEDURE — 97110 THERAPEUTIC EXERCISES: CPT | Mod: PN | Performed by: PHYSICAL THERAPIST

## 2023-10-13 PROCEDURE — 71260 CT THORAX DX C+: CPT | Mod: TC

## 2023-10-13 PROCEDURE — 71260 CT THORAX DX C+: CPT | Mod: 26,Q1,, | Performed by: RADIOLOGY

## 2023-10-13 PROCEDURE — 74177 CT CHEST ABDOMEN PELVIS WITH CONTRAST (XPD): ICD-10-PCS | Mod: 26,Q1,, | Performed by: RADIOLOGY

## 2023-10-13 PROCEDURE — 97164 PT RE-EVAL EST PLAN CARE: CPT | Mod: PN | Performed by: PHYSICAL THERAPIST

## 2023-10-13 RX ADMIN — BARIUM SULFATE 450 ML: 20 SUSPENSION ORAL at 03:10

## 2023-10-13 RX ADMIN — IOHEXOL 75 ML: 350 INJECTION, SOLUTION INTRAVENOUS at 03:10

## 2023-10-13 NOTE — PATIENT INSTRUCTIONS
Can use Heat x 10 minutes but always follow up with Ice for 10 minutes.    Can use Ice for 20 minutes at a time.    Keep moving (bending and straightening your left knee)    Follow up with Referring provider if symptoms worsen in the next 7 days

## 2023-10-13 NOTE — PROGRESS NOTES
[facility]   Physical Therapy Re-Evaluation and Treatment Note      Name: Lukasz Person  Clinic Number: [id number]    Therapy Diagnosis:   Encounter Diagnoses   Name Primary?    Imbalance Yes    Physical deconditioning     Traumatic tear of left rotator cuff, unspecified tear extent, initial encounter        Physician: Kirk Wilson MD    Visit Date: 10/13/2023    Physician Orders: PT Eval and Treat   Medical Diagnosis from Referral: Imbalance  Evaluation Date: 2023  Authorization Period Expiration: 2024  Plan of Care Expiration: 10/30/2023  Progress Note Due: 2023  Visit # / Visits authorized: 1(+1 Eval)/ 20    PTA Visit #: 0/5      Precautions: Standard, Fall, and Immunosuppression      Time In: 0900   Time Out: 1000  Total Billable Time: 60 minutes    Subjective     Patient reports: He followed up with MD who ordered MRI of Left shoulder.   He was not compliant with home exercise program due to recovering from fall  Response to previous treatment:pt reports that he felt that he was progressing well  Functional change: pt with difficulty walking, sleeping, bending left leg during walking and sitting.    Objective      Objective Measures updated at progress report unless specified.     Left Knee ROM:   Passive in supine: 80 degrees  AAROM in sittin degrees  *ROM limited by pain and muscle tightness in anticipation of pain.  Dicussed home use of ice and heat to improve pain    Strength testing  limited by pain.    Shoulder discussed but not evaluated. Recommend pt f/u with Ortho for shoulder/RTC tear/SLAP lesion found on recent MRI    Treatment     Lukasz received the treatments listed below:      therapeutic exercises to develop strength, endurance, ROM, flexibility, posture, and core stabilization for 25 minutes including:  +Supine Heel Slides AAROM and assit with strap  +Seated LAQ AAROM   +Seated Knee flexion AAROM/PROM    Not performed today  NuStep 10 minutes  (instruction on set up) seat @9 and arms at 11  Supine  +Bridges 30x  +Abimael SLR with 2# ankle weight 30x  Sidelying  +Clam bilateral 30x 0#       neuromuscular re-education activities to improve: Balance, Coordination, Kinesthetic, Sense, Proprioception, and Posture for 00 minutes. The following activities were included:  Not performed today  Standing:  +EC, FT firm surface head turns vert and horiz x 10 reps each  +EC, FT compliant surface head turns vert and horiz x 10 reps each; LOB on last rep of vertical HTs   +SLS on foam oval, hip flex, ext, abd x 10 reps x 2 sets 0 lbs    therapeutic activities to improve functional performance for 00  minutes, including:     gait training to improve functional mobility and safety for 00  minutes, including:      Patient Education and Home Exercises       Education provided:   - balance  -falls and safety    Written Home Exercises Provided: yes. Exercises were reviewed and Lukasz was able to demonstrate them prior to the end of the session.  Lukasz demonstrated good  understanding of the education provided. See Electronic Medical Record under Patient Instructions for exercises provided during therapy sessions    Assessment   Pt presents with limited mobility s/p fall nearly 1 month ago.  Pt unable to tolerate balance program at this time.  Will follow up with pt in 2 weeks in therapy to continue balance program.      This is a 68 y.o. male with a medical diagnosis of        Encounter Diagnoses   Name Primary?    Physical deconditioning Yes    Imbalance     Patient presents with impaired balance. Lukasz will benefit from skilled physical therapy intervention to address the above impairments and functional limitations. Pt would benefit from general strengthening and conditioning while including weightbearing balance activitites to improve safe functional mobility to meet the patient's goals for therapy.    Lukasz Is progressing well towards his goals.   Patient prognosis is  Excellent.     Patient will continue to benefit from skilled outpatient physical therapy to address the deficits listed in the problem list box on initial evaluation, provide pt/family education and to maximize pt's level of independence in the home and community environment.     Patient's spiritual, cultural and educational needs considered and pt agreeable to plan of care and goals.     Anticipated barriers to physical therapy: saima    The following goals were discussed with the patient and patient is in agreement with them as to be addressed in the treatment plan.     Goals:  STG'S: 3 weeks  1. Patient independent in HEP of balance and head-eye coordination.  Exercises to be done Daily.  2. Patient able to perform forward bending without LOB or c/o dizziness  3. Pt to improve Hughes by 4-6 points for improved safety with functional mobility     LTG'S  : 6 weeks  1. Patient able to ambulate in community safely without assistive device, on varied terrainwith head movement, without LOB or c/o dizziness.  2. Pt to be I with self management of condition and progression vestibular program for maintenance.  3. Pt to improve Hughes by 6-10 points for improved safety with functional mobility    Plan   Plan of care Certification: 8/25/2023 to 10/30/2023.     PLAN: Will allow pt to hold PT sessions for 2 weeks while performing HEP for Left knee and hip mobility and to f/u with MD for additional evaluation if pain and ROM do not improve in the next week; once pain improved physical therapy plan will progress LE/Hip/Core stabilization program to tolerance and progress balance activities to improve functional balance and safe mobility.     Lukasz will be seen 2 times per weeks for the next 6 weeks for neuromuscular education, balance and coordinatoin training, therapeutic exercise, gait training, proprioception exercises, postural education, dynamic standing balance exercises, sensory organization activities, eye head coordination  exercises , and and other therapeutic interventions as deemed necessary to address above goals.. Pt may be seen by a PTA as part of the Rehab Team.      Calrissa Renae, PT, DPT, MHA, CLT, CEAS

## 2023-10-13 NOTE — PROGRESS NOTES
Oncology Nutrition Assessment for Medical Nutrition Therapy  Follow-up Visit    Lukasz Person   1955    Referring Provider: No ref. provider found      Reason for Visit: nutrition counseling and education    The patient location is: [place]  The chief complaint leading to consultation is: nutrition follow-up    Visit type: audiovisual    Face to Face time with patient: 14 minutes  20 minutes of total time spent on the encounter, which includes face to face time and non-face to face time preparing to see the patient (eg, review of tests), Obtaining and/or reviewing separately obtained history, Documenting clinical information in the electronic or other health record, Independently interpreting results (not separately reported) and communicating results to the patient/family/caregiver, or Care coordination (not separately reported).     Each patient to whom he or she provides medical services by telemedicine is:  (1) informed of the relationship between the physician and patient and the respective role of any other health care provider with respect to management of the patient; and (2) notified that he or she may decline to receive medical services by telemedicine and may withdraw from such care at any time.    PMHx:   Past Medical History:   Diagnosis Date    Anticoagulant long-term use     Diabetes mellitus     Onset late 50s/early 60s    Hyperlipidemia     Hypertension     Onset late 50s/early 60s    Kidney stones     Sleep apnea     since 2006       Nutrition Assessment    This is a 68 y.o.male with a medical diagnosis of esophageal adenocarcinoma s/p neoadjuvant chemoradiation and now s/p esophagectomy 3/14. He is known to me from previous appointments. At our last appointment, he had been maintaining his weight (about 199lb). He has lost ~10lb over the past 3 months. He is now s/p cycle 6 adjuvant Opdivo. He reports that he had been feeling better, eating better/moving around more until he had a fall about a  month ago. He reports his appetite is still good, eating well but still trying to find the amount of food he can handle. He continues to eat small frequent meals/snacks. He continues to supplement with protein bars, most recently [employer]. He is looking forward to continuing to work with PT and do yoga.    Weight: 84.9 kg (187 lb 2.7 oz) - 9/18 clinic weight  Height: 6' (182.9 cm)  BMI: 25.4    Usual BW: 260-265lb  Weight Change: 30lb loss over 3 months (15lb of which were lost after surgery); another 20lb loss over 1.5 months    Allergies: Patient has no known allergies.    Current Medications:  Current Outpatient Medications:     allopurinoL (ZYLOPRIM) 100 MG tablet, TAKE 1 TABLET BY MOUTH EVERY DAY, Disp: 30 tablet, Rfl: 10    blood sugar diagnostic (ACCU-CHEK ANTONELLA PLUS TEST STRP) Strp, Use to test CBG four times daily E11.65, Disp: 400 strip, Rfl: 3    blood-glucose meter kit, Checks blood sugars 1x/daily., Disp: 1 each, Rfl: 12    citric acid-potassium citrate (POLYCITRA) 1,100-334 mg/5 mL solution, Take 10 mLs (20 mEq total) by mouth 2 (two) times a day., Disp: 600 mL, Rfl: 5    hydroCHLOROthiazide (HYDRODIURIL) 25 MG tablet, Take 1 tablet (25 mg total) by mouth once daily., Disp: 30 tablet, Rfl: 11    lancets (ACCU-CHEK SOFTCLIX LANCETS) Misc, Use to test CBG 4 times daily E11.65, Disp: 400 each, Rfl: 1    metoprolol succinate (TOPROL-XL) 25 MG 24 hr tablet, Take 0.5 tablets (12.5 mg total) by mouth once daily., Disp: 15 tablet, Rfl: 11    nitroGLYCERIN (NITROSTAT) 0.4 MG SL tablet, Place 1 tablet (0.4 mg total) under the tongue every 5 (five) minutes as needed for Chest pain. If you need a third tablet, call 911, Disp: 60 tablet, Rfl: 12    omeprazole (PRILOSEC) 40 MG capsule, Take 1 capsule (40 mg total) by mouth once daily., Disp: 90 capsule, Rfl: 3    rivaroxaban (XARELTO) 20 mg Tab, Take 1 tablet (20 mg total) by mouth daily with dinner or evening meal., Disp: 90 tablet, Rfl: 3    rosuvastatin  (CRESTOR) 20 MG tablet, Take 1 tablet (20 mg total) by mouth every evening., Disp: 90 tablet, Rfl: 3    tamsulosin (FLOMAX) 0.4 mg Cap, TAKE 1 CAPSULE(0.4 MG) BY MOUTH EVERY DAY, Disp: 30 capsule, Rfl: 2    testosterone (ANDROGEL) 20.25 mg/1.25 gram (1.62 %) GlPm, Apply 4 pumps to shoulders daily, Disp: 2 each, Rfl: 5    testosterone cypionate (DEPOTESTOTERONE CYPIONATE) 200 mg/mL injection, INJECT 2ML INTRAMUSCULARLY ONCE A MONTH AS DIRECTED, Disp: , Rfl:     tiZANidine (ZANAFLEX) 4 MG tablet, Take 1 tablet (4 mg total) by mouth every 8 (eight) hours as needed., Disp: 90 tablet, Rfl: 0    traMADoL (ULTRAM) 50 mg tablet, Take 1 tablet (50 mg total) by mouth every 8 (eight) hours as needed for Pain., Disp: 21 tablet, Rfl: 0    triamcinolone acetonide 0.1% (KENALOG) 0.1 % cream, Apply topically 2 (two) times daily., Disp: 45 g, Rfl: 1    Food/medication interactions noted: none    Vitamins/Supplements: none    Labs: Reviewed    Nutrition Diagnosis    Problem: moderate malnutrition  Etiology (related to): appetite loss and early satiety  Signs/Symptoms (as evidenced by): reports of inadequate PO intake, weight loss, and both fat & muscle wasting present  Status: Improving    Nutrition Intervention    Nutrition Prescription   2123 kcals (25kcal/kg)  85g protein (1g/kg)   2123mL fluid (25mL/kg)    Recommendations:  Continue eating small frequent meals/snacks - at least 6/day  Make meals/snacks high in calories and protein - examples discussed  Continue to supplement with protein bar or drink daily  Continue to drink at least 64oz fluid/day    Materials Provided/Reviewed: none    Nutrition Monitoring and Evaluation    Monitor: diet education needs, energy intake, and weight status    Goals: weight maintenance    Follow up: in 3 months    Communication to referring provider/care team: note available in chart    Counseling time: 14 minutes    Carmen Clark MS, RD, LDN

## 2023-10-13 NOTE — TELEPHONE ENCOUNTER
"Familial?  Lab Results   Component Value Date    CHOL 429 (H) 10/13/2023     Lab Results   Component Value Date    HDL 43 10/13/2023     Lab Results   Component Value Date    LDLCALC 349.2 (H) 10/13/2023     No results found for: "DLDL"  Lab Results   Component Value Date    TRIG 184 (H) 10/13/2023       f1   Lab Results   Component Value Date    CHOLHDL 10.0 (L) 10/13/2023     Will refer to endocrinology for evaluation.  Low-cholesterol / lipid diet recommended  Physical activity recommended.  " Placed call to pt and informed him his CT scan time has been r/s to 7:15 pm and pt has to be at the the facility 1 hour prior and NPO 4 hours before scan. Pt acknowledged the new CT scan time.

## 2023-10-16 ENCOUNTER — OFFICE VISIT (OUTPATIENT)
Dept: HEMATOLOGY/ONCOLOGY | Facility: CLINIC | Age: 68
End: 2023-10-16
Payer: MEDICARE

## 2023-10-16 ENCOUNTER — INFUSION (OUTPATIENT)
Dept: INFUSION THERAPY | Facility: HOSPITAL | Age: 68
End: 2023-10-16
Payer: MEDICARE

## 2023-10-16 VITALS
SYSTOLIC BLOOD PRESSURE: 145 MMHG | DIASTOLIC BLOOD PRESSURE: 65 MMHG | RESPIRATION RATE: 18 BRPM | HEART RATE: 59 BPM | TEMPERATURE: 99 F | OXYGEN SATURATION: 100 %

## 2023-10-16 VITALS
RESPIRATION RATE: 18 BRPM | HEIGHT: 73 IN | SYSTOLIC BLOOD PRESSURE: 139 MMHG | DIASTOLIC BLOOD PRESSURE: 65 MMHG | OXYGEN SATURATION: 99 % | WEIGHT: 183.56 LBS | HEART RATE: 55 BPM | BODY MASS INDEX: 24.33 KG/M2 | TEMPERATURE: 98 F

## 2023-10-16 DIAGNOSIS — E11.22 TYPE 2 DIABETES MELLITUS WITH STAGE 3 CHRONIC KIDNEY DISEASE, WITHOUT LONG-TERM CURRENT USE OF INSULIN, UNSPECIFIED WHETHER STAGE 3A OR 3B CKD: ICD-10-CM

## 2023-10-16 DIAGNOSIS — E11.59 OBESITY, DIABETES, AND HYPERTENSION SYNDROME: ICD-10-CM

## 2023-10-16 DIAGNOSIS — I25.10 CORONARY ARTERY DISEASE INVOLVING NATIVE CORONARY ARTERY OF NATIVE HEART WITHOUT ANGINA PECTORIS: ICD-10-CM

## 2023-10-16 DIAGNOSIS — J38.00 VOCAL CORD PARALYSIS: ICD-10-CM

## 2023-10-16 DIAGNOSIS — N18.30 TYPE 2 DIABETES MELLITUS WITH STAGE 3 CHRONIC KIDNEY DISEASE, WITHOUT LONG-TERM CURRENT USE OF INSULIN, UNSPECIFIED WHETHER STAGE 3A OR 3B CKD: ICD-10-CM

## 2023-10-16 DIAGNOSIS — E66.9 OBESITY, DIABETES, AND HYPERTENSION SYNDROME: ICD-10-CM

## 2023-10-16 DIAGNOSIS — I15.2 HYPERTENSION ASSOCIATED WITH DIABETES: ICD-10-CM

## 2023-10-16 DIAGNOSIS — E78.5 HYPERLIPIDEMIA ASSOCIATED WITH TYPE 2 DIABETES MELLITUS: ICD-10-CM

## 2023-10-16 DIAGNOSIS — E11.29 TYPE 2 DIABETES MELLITUS WITH OTHER DIABETIC KIDNEY COMPLICATION, WITHOUT LONG-TERM CURRENT USE OF INSULIN: ICD-10-CM

## 2023-10-16 DIAGNOSIS — I48.0 PAROXYSMAL ATRIAL FIBRILLATION: ICD-10-CM

## 2023-10-16 DIAGNOSIS — R21 RASH: ICD-10-CM

## 2023-10-16 DIAGNOSIS — K04.7 DENTAL INFECTION: ICD-10-CM

## 2023-10-16 DIAGNOSIS — I15.2 OBESITY, DIABETES, AND HYPERTENSION SYNDROME: ICD-10-CM

## 2023-10-16 DIAGNOSIS — C15.9 ESOPHAGEAL ADENOCARCINOMA: Primary | ICD-10-CM

## 2023-10-16 DIAGNOSIS — E11.69 HYPERLIPIDEMIA ASSOCIATED WITH TYPE 2 DIABETES MELLITUS: ICD-10-CM

## 2023-10-16 DIAGNOSIS — I50.20 HFREF (HEART FAILURE WITH REDUCED EJECTION FRACTION): ICD-10-CM

## 2023-10-16 DIAGNOSIS — E11.69 OBESITY, DIABETES, AND HYPERTENSION SYNDROME: ICD-10-CM

## 2023-10-16 DIAGNOSIS — E11.59 HYPERTENSION ASSOCIATED WITH DIABETES: ICD-10-CM

## 2023-10-16 PROCEDURE — 99215 OFFICE O/P EST HI 40 MIN: CPT | Mod: Q1,S$PBB,, | Performed by: INTERNAL MEDICINE

## 2023-10-16 PROCEDURE — 99999 PR PBB SHADOW E&M-EST. PATIENT-LVL III: CPT | Mod: PBBFAC,,, | Performed by: INTERNAL MEDICINE

## 2023-10-16 PROCEDURE — 99999 PR PBB SHADOW E&M-EST. PATIENT-LVL III: ICD-10-PCS | Mod: PBBFAC,,, | Performed by: INTERNAL MEDICINE

## 2023-10-16 PROCEDURE — 63600175 PHARM REV CODE 636 W HCPCS: Mod: Q1 | Performed by: INTERNAL MEDICINE

## 2023-10-16 PROCEDURE — 25000003 PHARM REV CODE 250: Mod: Q1 | Performed by: INTERNAL MEDICINE

## 2023-10-16 PROCEDURE — 99213 OFFICE O/P EST LOW 20 MIN: CPT | Mod: PBBFAC,25 | Performed by: INTERNAL MEDICINE

## 2023-10-16 PROCEDURE — 96413 CHEMO IV INFUSION 1 HR: CPT

## 2023-10-16 PROCEDURE — 99215 PR OFFICE/OUTPT VISIT, EST, LEVL V, 40-54 MIN: ICD-10-PCS | Mod: Q1,S$PBB,, | Performed by: INTERNAL MEDICINE

## 2023-10-16 PROCEDURE — A4216 STERILE WATER/SALINE, 10 ML: HCPCS | Mod: Q1 | Performed by: INTERNAL MEDICINE

## 2023-10-16 RX ORDER — DIPHENHYDRAMINE HYDROCHLORIDE 50 MG/ML
50 INJECTION INTRAMUSCULAR; INTRAVENOUS ONCE AS NEEDED
Status: CANCELLED | OUTPATIENT
Start: 2023-10-16

## 2023-10-16 RX ORDER — EPINEPHRINE 0.3 MG/.3ML
0.3 INJECTION SUBCUTANEOUS ONCE AS NEEDED
Status: CANCELLED | OUTPATIENT
Start: 2023-10-16

## 2023-10-16 RX ORDER — HEPARIN 100 UNIT/ML
500 SYRINGE INTRAVENOUS
Status: DISCONTINUED | OUTPATIENT
Start: 2023-10-16 | End: 2023-10-16 | Stop reason: HOSPADM

## 2023-10-16 RX ORDER — SODIUM CHLORIDE 0.9 % (FLUSH) 0.9 %
10 SYRINGE (ML) INJECTION
Status: DISCONTINUED | OUTPATIENT
Start: 2023-10-16 | End: 2023-10-16 | Stop reason: HOSPADM

## 2023-10-16 RX ORDER — SODIUM CHLORIDE 0.9 % (FLUSH) 0.9 %
10 SYRINGE (ML) INJECTION
Status: CANCELLED | OUTPATIENT
Start: 2023-10-16

## 2023-10-16 RX ORDER — HEPARIN 100 UNIT/ML
500 SYRINGE INTRAVENOUS
Status: CANCELLED | OUTPATIENT
Start: 2023-10-16

## 2023-10-16 RX ORDER — EPINEPHRINE 0.3 MG/.3ML
0.3 INJECTION SUBCUTANEOUS ONCE AS NEEDED
Status: DISCONTINUED | OUTPATIENT
Start: 2023-10-16 | End: 2023-10-16 | Stop reason: HOSPADM

## 2023-10-16 RX ORDER — DIPHENHYDRAMINE HYDROCHLORIDE 50 MG/ML
50 INJECTION INTRAMUSCULAR; INTRAVENOUS ONCE AS NEEDED
Status: DISCONTINUED | OUTPATIENT
Start: 2023-10-16 | End: 2023-10-16 | Stop reason: HOSPADM

## 2023-10-16 RX ADMIN — Medication 10 ML: at 12:10

## 2023-10-16 RX ADMIN — SODIUM CHLORIDE: 9 INJECTION, SOLUTION INTRAVENOUS at 11:10

## 2023-10-16 RX ADMIN — HEPARIN 500 UNITS: 100 SYRINGE at 12:10

## 2023-10-16 NOTE — PROGRESS NOTES
MEDICAL ONCOLOGY - ESTABLISHED PATIENT VISIT    Reason for visit: esophageal cancer    Best Contact Phone Number(s): There are no phone numbers on file.     Cancer/Stage/TNM:    Cancer Staging   Esophageal adenocarcinoma  Staging form: Esophagus - Adenocarcinoma, AJCC 8th Edition  - Pathologic stage from 3/28/2023: Stage II (ypT3, pN0, cM0, G2) - Signed by Santana Brown Jr., MD on 3/28/2023       Oncology History   Esophageal adenocarcinoma   12/8/2022 Initial Diagnosis    Esophageal adenocarcinoma     12/21/2022 - 12/21/2022 Chemotherapy    Treatment Summary   Plan Name: OP ESOPHAGEAL PACLITAXEL CARBOPLATIN WEEKLY  Treatment Goal: Curative  Status: Inactive  Start Date:   End Date:   Provider: Miki Medrano MD  Chemotherapy: CARBOplatin (PARAPLATIN) in sodium chloride 0.9% 250 mL chemo infusion, , Intravenous, Clinic/HOD 1 time, 0 of 1 cycle  PACLitaxeL (TAXOL) 50 mg/m2 = 120 mg in sodium chloride 0.9% 250 mL chemo infusion, 50 mg/m2, Intravenous, Clinic/HOD 1 time, 0 of 1 cycle     12/29/2022 - 1/20/2023 Chemotherapy    Treatment Summary   Plan Name: [facility] [id number] ARM B CARBOPLATIN PACLITAXEL NIVOLUMAB  Treatment Goal: Curative  Status: Inactive  Start Date: 12/29/2022  End Date: 1/20/2023  Provider: Miki Medrano MD  Chemotherapy: CARBOplatin (PARAPLATIN) 215 mg in sodium chloride 0.9% 306.5 mL chemo infusion, 215 mg, Intravenous, Clinic/HOD 1 time, 4 of 5 cycles  Administration: 215 mg (12/29/2022), 230 mg (1/5/2023), 265 mg (1/13/2023), 265 mg (1/20/2023)  PACLitaxeL (TAXOL) 50 mg/m2 = 120 mg in sodium chloride 0.9% 250 mL chemo infusion, 50 mg/m2 = 120 mg, Intravenous, Clinic/HOD 1 time, 4 of 5 cycles  Dose modification: 50 mg/m2 (original dose 50 mg/m2, Cycle 4)  Administration: 120 mg (12/29/2022), 120 mg (1/5/2023), 120 mg (1/13/2023), 120 mg (1/20/2023)     12/29/2022 - 2/1/2023 Radiation Therapy    Treating physician: Dr. Javier Moulton    Course: C1 CHEST 2022    Treatment Site Ref.  ID Energy Dose/Fx (Gy) #Fx Dose Correction (Gy) Total Dose (Gy) Start Date End Date Elapsed Days   IM Esophagus [id number] 6X 1.8 23 / 23 0 41.4 12/29/2022 2/1/2023 34        3/17/2023 Surgery    Procedure: Procedure(s) (LRB):  XI ROBOTIC ESOPHAGECTOMY (N/A)  ROBOTIC JEJUNOSTOMY TUBE INSERTION (N/A)      Surgeon(s) and Role:     * Santana Brown Jr., MD - Primary     * Darian Murphy MD - Assisting     Assistance: Due to having no qualified resident during critical portions of the case, Dr. Darian Murphy acted as an assistant.     Pre-Operative Diagnosis: Esophageal adenocarcinoma, distal 1/3     Post-Operative Diagnosis: Same     Pre-Operative Variables:  Stage: T3 N0  Adjacent organ involvement: None  Chemotherapy within 90 Days: Yes  Radiation Therapy within 90 Days: Yes     Comorbidities:  Morbid obesity  Type 2 diabetes mellitus  Obstructive sleep apnea  Gout  Hyperparathyroidism  Hypertension  Severe obesity  Coronary artery disease  Heart failure with reduced ejection fraction    Procedure:  Robotic-assisted Vj three field esophagectomy  Regional mediastinal lymph node dissection  Botox injection of the pylorus  Jejunostomy tube placement     Operative Findings:                No evidence of distant intraperitoneal metastases in the chest or abdomen  Esophageal tumor located in the distal third of the esophagus, mild radiation changes appreciated.  Resection status:  R0 pending final pathology.  No gross disease remaining post dissection.  Frozen sections on proximal and distal margins negative for malignancy  100u Botox injection into the pylorus  Stapled esophagogastrostomy performed at the neck incision after confirmation of negative margins.  Jejunostomy tube placed      3/28/2023 Cancer Staged    Staging form: Esophagus - Adenocarcinoma, AJCC 8th Edition  - Pathologic stage from 3/28/2023: Stage II (ypT3, pN0, cM0, G2)     5/1/2023 - 5/1/2023 Chemotherapy    Treatment Summary   Plan Name: [facility] [id number]  ARM C ADJUVANT NIVOLUMAB  Treatment Goal: Curative  Status: Inactive  Start Date: 5/1/2023  End Date: 5/1/2023  Provider: Miki Medrano MD  Chemotherapy: [No matching medication found in this treatment plan]     5/29/2023 -  Chemotherapy    Treatment Summary   Plan Name: [facility] [id number] ARM C ADJUVANT NIVOLUMAB STEP 2  Treatment Goal: Curative  Status: Active  Start Date: 5/29/2023  End Date: 4/29/2024 (Planned)  Provider: Miki Medrano MD  Chemotherapy: [No matching medication found in this treatment plan]          Interim History:   Mr. Person returns to clinic today prior to cycle 7 of adjuvant nivolumab. He underwent robotic esophagectomy on 3/14/23 with Dr. Brown and J tube insertion.     He feels well today. He tripped and fell while walking about four weeks ago.  Had a contusion in his left anterior thigh where he fell.  Also was noted to have a left shoulder MRI with partial thickness tear of supraspinatus Pruritis on his back and scalp is ongoing but controlled with triamcinolone which is helping.  No diarrhea.  Denies dyspnea but does notice some increase in mild aspiration events.    Presents to clinic alone. ECOG 0.     Review of Systems   Constitutional:  Positive for weight loss. Negative for chills, diaphoresis, fever and malaise/fatigue.   HENT:  Negative for sore throat.    Eyes:  Negative for blurred vision and double vision.   Respiratory:  Negative for cough and shortness of breath.    Cardiovascular:  Negative for chest pain, palpitations and leg swelling.   Gastrointestinal:  Negative for abdominal pain, blood in stool, constipation, diarrhea, heartburn, nausea and vomiting.        Dysphagia   Genitourinary:  Negative for dysuria, frequency, hematuria and urgency.   Musculoskeletal:  Negative for back pain, falls, myalgias and neck pain.   Skin:  Positive for itching and rash.   Neurological:  Negative for dizziness, tingling, weakness and headaches.   Psychiatric/Behavioral:  The  patient is not nervous/anxious.        Past Medical History:   Past Medical History:   Diagnosis Date    Anticoagulant long-term use     Diabetes mellitus     Onset late 50s/early 60s    Hyperlipidemia     Hypertension     Onset late 50s/early 60s    Kidney stones     Sleep apnea     since 2006      Past Surgical History:   Past Surgical History:   Procedure Laterality Date    CORONARY ANGIOGRAPHY N/A 9/30/2020    Procedure: ANGIOGRAM, CORONARY ARTERY;  Surgeon: John West MD;  Location: [facility] CATH LAB;  Service: Cardiology;  Laterality: N/A;    CYSTOSCOPY N/A 2/17/2022    Procedure: CYSTOSCOPY;  Surgeon: Lukasz Hughes MD;  [facility];  Service: Urology;  Laterality: N/A;    CYSTOSCOPY W/ URETERAL STENT PLACEMENT N/A 2/25/2022    Procedure: CYSTOSCOPY, WITH URETERAL STENT INSERTION;  Surgeon: Lukasz Hughes MD;  [facility];  Service: Urology;  Laterality: N/A;    ENDOSCOPIC ULTRASOUND OF UPPER GASTROINTESTINAL TRACT N/A 12/7/2022    Procedure: ULTRASOUND, UPPER GI TRACT, ENDOSCOPIC;  Surgeon: Darian Main MD;  Location: [facility] ENDO;  Service: Endoscopy;  Laterality: N/A;  Approval to hold Xarelto rec'd from Dr. Nick (see t/e 12/5/22)-DS    ESOPHAGOGASTRODUODENOSCOPY N/A 11/17/2022    Procedure: EGD (ESOPHAGOGASTRODUODENOSCOPY);  Surgeon: Brody Gonzales MD;  Location: [facility] (2ND FLR);  Service: Endoscopy;  Laterality: N/A;  inst via email-ok to hold Xarelto x 2 days-MS    ESOPHAGOGASTRODUODENOSCOPY N/A 5/31/2023    Procedure: EGD (ESOPHAGOGASTRODUODENOSCOPY) WITH DILATION;  Surgeon: Santana Brown Jr., MD;  Location: [facility] OR 2ND FLR;  Service: General;  Laterality: N/A;    LASER LITHOTRIPSY Left 2/25/2022    Procedure: LITHOTRIPSY, USING LASER;  Surgeon: Lukasz Hughes MD;  Location: [address];  Service: Urology;  Laterality: Left;    LEFT HEART CATHETERIZATION Left 9/30/2020    Procedure: Left heart cath;  Surgeon: John West MD;   Location: [facility] CATH LAB;  Service: Cardiology;  Laterality: Left;    LITHOTRIPSY      PARATHYROIDECTOMY  1/1/2-107    PYELOSCOPY Left 2/25/2022    Procedure: PYELOSCOPY;  Surgeon: Lukasz Hughes MD;  Location: [address];  Service: Urology;  Laterality: Left;    ROBOT-ASSISTED SURGICAL REMOVAL OF ESOPHAGUS USING DA CARLOS XI N/A 3/14/2023    Procedure: XI ROBOTIC ESOPHAGECTOMY;  Surgeon: Santana Brown Jr., MD;  Location: [address];  Service: General;  Laterality: N/A;  Abdomen, Chest and Neck    ROBOTIC JEJUNOSTOMY N/A 3/14/2023    Procedure: ROBOTIC JEJUNOSTOMY TUBE INSERTION;  Surgeon: Santana Brown Jr., MD;  Location: [address];  Service: General;  Laterality: N/A;    TRANSESOPHAGEAL ECHOCARDIOGRAPHY N/A 4/7/2022    Procedure: ECHOCARDIOGRAM, TRANSESOPHAGEAL;  Surgeon: [facility];  Location: [facility] EP LAB;  Service: Cardiology;  Laterality: N/A;    TREATMENT OF CARDIAC ARRHYTHMIA N/A 4/7/2022    Procedure: Cardioversion or Defibrillation;  Surgeon: Iris Fisher NP;  Location: [facility] EP LAB;  Service: Cardiology;  Laterality: N/A;  afib, dccv, artie, anes, EH, 3prep    URETEROSCOPIC REMOVAL OF URETERIC CALCULUS Left 2/25/2022    Procedure: REMOVAL, CALCULUS, URETER, URETEROSCOPIC;  Surgeon: Lukasz Hughes MD;  Location: [address];  Service: Urology;  Laterality: Left;    URETEROSCOPY Left 2/25/2022    Procedure: URETEROSCOPY;  Surgeon: Lukasz Hughes MD;  Location: [address];  Service: Urology;  Laterality: Left;    VOCAL CORD INJECTION Left 3/17/2023    Procedure: INJECTION, VOCAL CORD, LARYNGOSCOPIC;  Surgeon: Gm Altman MD;  Location: [address];  Service: ENT;  Laterality: Left;      Family History:   Family History   Problem Relation Age of Onset    Arthritis Mother     Heart disease Father 70        CABG    Arthritis Father     Diabetes Father     Kidney disease Father         had one kidney removed in early thirties    Arthritis Sister      Arthritis Sister     Hypertension Sister     Colon cancer Brother 32    Arthritis Brother     Alcohol abuse Paternal Aunt     Cancer Maternal Grandfather         throat cancer    Diabetes Paternal Grandmother     Colon polyps Paternal Grandfather     Colon cancer Paternal Grandfather     Cancer Paternal Grandfather         colon cancer at age 62      Social History:   Social History     Tobacco Use    Smoking status: Former     Current packs/day: 0.00     Average packs/day: 1 pack/day for 22.7 years (22.7 ttl pk-yrs)     Types: Cigarettes, Cigars     Start date: 1972     Quit date:      Years since quittin.4     Passive exposure: Never    Smokeless tobacco: Never    Tobacco comments:     smoking was off and on.  cumulative 7 years.  never more than three years in one stretch or more lj   Substance Use Topics    Alcohol use: Not Currently      I have reviewed and updated the patient's past medical, surgical, family and social histories.    Allergies:   Review of patient's allergies indicates:  No Known Allergies     Medications:   Current Outpatient Medications   Medication Sig Dispense Refill    allopurinoL (ZYLOPRIM) 100 MG tablet TAKE 1 TABLET BY MOUTH EVERY DAY 30 tablet 10    blood sugar diagnostic (ACCU-CHEK ANTONELLA PLUS TEST STRP) Strp Use to test CBG four times daily E11.65 400 strip 3    blood-glucose meter kit Checks blood sugars 1x/daily. 1 each 12    citric acid-potassium citrate (POLYCITRA) 1,100-334 mg/5 mL solution Take 10 mLs (20 mEq total) by mouth 2 (two) times a day. 600 mL 5    hydroCHLOROthiazide (HYDRODIURIL) 25 MG tablet Take 1 tablet (25 mg total) by mouth once daily. 30 tablet 11    lancets (ACCU-CHEK SOFTCLIX LANCETS) Misc Use to test CBG 4 times daily E11.65 400 each 1    metoprolol succinate (TOPROL-XL) 25 MG 24 hr tablet Take 0.5 tablets (12.5 mg total) by mouth once daily. 15 tablet 11    nitroGLYCERIN (NITROSTAT) 0.4 MG SL tablet Place 1 tablet (0.4 mg total) under the  "tongue every 5 (five) minutes as needed for Chest pain. If you need a third tablet, call 911 60 tablet 12    omeprazole (PRILOSEC) 40 MG capsule Take 1 capsule (40 mg total) by mouth once daily. 90 capsule 3    rivaroxaban (XARELTO) 20 mg Tab Take 1 tablet (20 mg total) by mouth daily with dinner or evening meal. 90 tablet 3    rosuvastatin (CRESTOR) 20 MG tablet Take 1 tablet (20 mg total) by mouth every evening. 90 tablet 3    tamsulosin (FLOMAX) 0.4 mg Cap TAKE 1 CAPSULE(0.4 MG) BY MOUTH EVERY DAY 30 capsule 2    testosterone (ANDROGEL) 20.25 mg/1.25 gram (1.62 %) GlPm Apply 4 pumps to shoulders daily 2 each 5    testosterone cypionate (DEPOTESTOTERONE CYPIONATE) 200 mg/mL injection INJECT 2ML INTRAMUSCULARLY ONCE A MONTH AS DIRECTED      tiZANidine (ZANAFLEX) 4 MG tablet Take 1 tablet (4 mg total) by mouth every 8 (eight) hours as needed. 90 tablet 0    traMADoL (ULTRAM) 50 mg tablet Take 1 tablet (50 mg total) by mouth every 8 (eight) hours as needed for Pain. 21 tablet 0    triamcinolone acetonide 0.1% (KENALOG) 0.1 % cream Apply topically 2 (two) times daily. 45 g 1     No current facility-administered medications for this visit.      Physical Exam:   /65 (BP Location: Left arm, Patient Position: Sitting)   Pulse (!) 55   Temp 98 °F (36.7 °C) (Oral)   Resp 18   Ht 6' 1" (1.854 m)   Wt 83.3 kg (183 lb 8.5 oz)   SpO2 99%   BMI 24.21 kg/m²      ECOG Performance status: 0    Physical Exam  Vitals reviewed.   Constitutional:       General: He is not in acute distress.     Appearance: Normal appearance. He is not ill-appearing, toxic-appearing or diaphoretic.   HENT:      Head: Normocephalic and atraumatic.      Right Ear: External ear normal.      Left Ear: External ear normal.      Nose: Nose normal. No congestion.      Mouth/Throat:      Pharynx: Oropharynx is clear.   Eyes:      General: No scleral icterus.     Extraocular Movements: Extraocular movements intact.      Conjunctiva/sclera: " Conjunctivae normal.      Pupils: Pupils are equal, round, and reactive to light.   Neck:      Comments: L neck wound well-healed  Cardiovascular:      Rate and Rhythm: Normal rate and regular rhythm.      Heart sounds: Normal heart sounds. No murmur heard.  Pulmonary:      Effort: Pulmonary effort is normal. No respiratory distress.      Breath sounds: Normal breath sounds. No wheezing or rales.   Abdominal:      General: Bowel sounds are normal. There is no distension.      Palpations: Abdomen is soft.      Tenderness: There is no abdominal tenderness.   Musculoskeletal:         General: No swelling.      Cervical back: Normal range of motion.   Lymphadenopathy:      Cervical: No cervical adenopathy.   Skin:     Coloration: Skin is not jaundiced.      Findings: Rash (grade I rash to upper chest/back.) present. No bruising or erythema.   Neurological:      General: No focal deficit present.      Mental Status: He is alert and oriented to person, place, and time. Mental status is at baseline.      Cranial Nerves: No cranial nerve deficit.      Motor: No weakness.      Gait: Gait normal.   Psychiatric:         Mood and Affect: Mood normal.         Behavior: Behavior normal.         Thought Content: Thought content normal.         Judgment: Judgment normal.         Labs:   Recent Results (from the past 48 hour(s))   DRUG STUDY SENDOUT, [facility].    Collection Time: 10/16/23  9:38 AM   Result Value Ref Range    Drug Study Test Name Drug Study     Drug Study Specimen Type b lood     Drug Study Test Result Result sent directly to physician    BILIRUBIN, DIRECT    Collection Time: 10/16/23  9:38 AM   Result Value Ref Range    Bilirubin, Direct 0.3 0.1 - 0.3 mg/dL   CBC W/ AUTO DIFFERENTIAL    Collection Time: 10/16/23  9:38 AM   Result Value Ref Range    WBC 3.29 (L) 3.90 - 12.70 K/uL    RBC 4.55 (L) 4.60 - 6.20 M/uL    Hemoglobin 13.0 (L) 14.0 - 18.0 g/dL    Hematocrit 40.5 40.0 - 54.0 %    MCV 89 82 - 98 fL    MCH 28.6 27.0  - 31.0 pg    MCHC 32.1 32.0 - 36.0 g/dL    RDW 15.0 (H) 11.5 - 14.5 %    Platelets 147 (L) 150 - 450 K/uL    MPV 10.1 9.2 - 12.9 fL    Immature Granulocytes 0.3 0.0 - 0.5 %    Gran # (ANC) 2.5 1.8 - 7.7 K/uL    Immature Grans (Abs) 0.01 0.00 - 0.04 K/uL    Lymph # 0.4 (L) 1.0 - 4.8 K/uL    Mono # 0.3 0.3 - 1.0 K/uL    Eos # 0.1 0.0 - 0.5 K/uL    Baso # 0.03 0.00 - 0.20 K/uL    nRBC 0 0 /100 WBC    Gran % 74.5 (H) 38.0 - 73.0 %    Lymph % 12.5 (L) 18.0 - 48.0 %    Mono % 8.5 4.0 - 15.0 %    Eosinophil % 3.3 0.0 - 8.0 %    Basophil % 0.9 0.0 - 1.9 %    Differential Method Automated    Renal Function Panel    Collection Time: 10/16/23  9:38 AM   Result Value Ref Range    Glucose 178 (H) 70 - 110 mg/dL    Sodium 141 136 - 145 mmol/L    Potassium 4.0 3.5 - 5.1 mmol/L    Chloride 102 95 - 110 mmol/L    CO2 27 23 - 29 mmol/L    BUN 16 8 - 23 mg/dL    Calcium 9.6 8.7 - 10.5 mg/dL    Creatinine 0.9 0.5 - 1.4 mg/dL    Albumin 4.0 3.5 - 5.2 g/dL    Phosphorus 3.6 2.7 - 4.5 mg/dL    eGFR >60.0 >60 mL/min/1.73 m^2    Anion Gap 12 8 - 16 mmol/L       I have reviewed the pertinent labs from 9/18/23 which are notable for mild anemia, mild thrombocytopenia.      Imaging:    10/13/23 - CT CAP:    Impression:     1. Postsurgical change of esophagectomy and gastric pull-through for esophageal adenocarcinoma.  No signs of recurrence or metastatic disease.  2. Additional findings as above.  RECIST SUMMARY:     Date of prior examination for comparison: 04/11/2023     No measurable lesions for RECIST criteria.    Path:   3/14/23:  Final Pathologic Diagnosis 1. LYMPH NODE, LEVEL 7, EXCISION:   One benign lymph node (0/1)   2. TOTAL THORACIC ESOPHAGECTOMY AND PROXIMAL STOMACH:   Adenocarcinoma, moderately differentiated, 3.0 cm   Margins are negative   Tumor invades adventitia   Extensive residual cancer with no evident tumor regression (poor or no   response, score 3)   Eleven benign lymph nodes (0/11)   3. RESIDUAL CONDUIT, EXCISION:    Negative for malignancy   SYNOPTIC REPORT   Procedure - Esophageal gastrectomy   Tumor site - GE Junction   Relationship of tumor to esophagogastric junction - Lesion is central at GE   junction   Tumor size - 3.0 x 3.1cm   Histologic type - Adenocarcinoma   Histologic grade - Moderately differentiated   Microscopic tumor extension - Tumor invades adventitia   Margins - All margins of resection are uninvolved, proximal, distal and   adventitial margins are negative   Treatment effect - Extensive residual cancer with no evident tumor regression   (poor or no response, score 3)   Lymphovascular invasion - Not identified   Pathologic staging - yp T3 N0 Mx   Lymph nodes examined - 12   Lymph nodes involved - 0   Additional pathologic findings - Intestinal metaplasia present   MMR-IHC has been performed on previous biopsy ([id number]) and shows all 4   antibodies intact          Assessment:       1. Esophageal adenocarcinoma    2. Vocal cord paralysis    3. Hypertension associated with diabetes    4. Hyperlipidemia associated with type 2 diabetes mellitus    5. Type 2 diabetes mellitus with stage 3 chronic kidney disease, without long-term current use of insulin, unspecified whether stage 3a or 3b CKD    6. Type 2 diabetes mellitus with other diabetic kidney complication, without long-term current use of insulin    7. Obesity, diabetes, and hypertension syndrome    8. Coronary artery disease involving native coronary artery of native heart without angina pectoris    9. Paroxysmal atrial fibrillation    10. HFrEF (heart failure with reduced ejection fraction)    11. Dental infection    12. Rash            Plan:     # Esophageal adenocarcinoma   Mr. Person is a pleasant 67 year old male who presents to our clinic for management of esophageal cancer. He initially presented with dysphagia, and further workup confirmed a stage II adenocarcinoma, pMMR. Tumor staged T3N0Mx by endosonographic criteria, JEVON. CT CAP on 12/14/22  confirmed no evidence of metastatic disease.    Previously conversation regarding his diagnosis and treatment options.  Recommended perioperative chemo/radiation per the CROSS trial. Discussed chemoradiation for ~5 weeks with 5 doses of weekly carboplatin and taxol administered. Plan to obtain restaging scans 4-5 weeks after radiation completed.     He was a candidate for a cooperative group trial assessing perioperative immunotherapy treatment. He consented for this study - ECOG-ACRIN [id number]: A Phase II/III Study of Dilcia-operative Nivolumab and Ipilimumab in Patients with Locoregional Esophageal and Gastroesophageal Junction Adenocarcinoma    Previously met with Dr. Santana Brown who agreed he was a surgical candidate.  Previously met with Dr. Javier Moulton who will be treating him with radiation.    Began cycle 1 carboplatin/paclitaxel/nivolumab on trial on 12/29/22.  Received cycle 4 of weekly chemotherapy on trial on 1/20/23.   We held chemotherapy for week 5 because of thrombocytopenia per protocol.    Completed radiation on 2/1/23.    Restaging PET/CT personally reviewed on 3/1/23 shows interval decreased FDG avidity in esophageal tumor, no new sites of disease.  CT CAP 3/2/23 shows stable esophageal thickening.    Underwent robotic esophagectomy on 3/14/23 with Dr. Brown.  Pathology showed negative margins, ypT3 N0 tumor with 0 of 12 lymph nodes involved.  No treatment effect was noted on the tumor tissue.    Discussed that per the [id number] protocol will proceed with randomization to adjuvant nivolumab +/- ipilimumab.  Patient randomized to receive adjuvant Nivolumab, started cycle 1 at 480 mg q4 weeks 5/1/23.  Plan for twelve months per protocol.    Tolerated very well.  Grade 1 pruritis.    Repeat imaging after cycle 6 shows DAVE.    Proceed with cycle 7 today at 480 mg q4 weeks.  Timeout occurred with myself and Maria Esther SORTO. Orders signed.    Underwent dilation with Dr. Brown on 5/31/23 with  temporary improvement in dysphagia.  Weight down slightly but has stabilized.  PD-L1 CPS 30.    RTC in 4 weeks.    # Vocal cord paralysis  Post-op. S/p injection by ENT.  stable.  Last evaluated 9/11/23 by ENT with improvement.  No further interventions planned at this time.    # HTN, HLD, DM, CKD  Following with PCP Dr. Wilson and nephrologist Dr. Oconnell.   BP controlled in clinic today. Has been off his HCTZ/lisinopril because of prior hypotension.  Cr stable.    # CAD, A fib, CHF  Following with cardiologist Dr. Nick & EP Dr. Phillip.   Continues on Xarelto.  Continue medical management.     # Dental Infection  Continues with several procedures.  No active infection currently.  Should not interfere with nivolumab treatment.  Monitor.     # Rash  Grade I, very mild. Likely 2/2 immunotherapy.   Continue triamcinolone.  Continue to monitor.     Follow up: 4 weeks per research.    Patient is in agreement with the proposed treatment plan. All questions were answered to the patient's satisfaction. Pt knows to call clinic if anything is needed before the next clinic visit.    Miki Medrano MD  Hematology/Oncology  [facility]    Route Chart for Scheduling    Med Onc Chart Routing      Follow up with physician . Per research   Follow up with SAMUEL    Infusion scheduling note    Injection scheduling note    Labs    Imaging    Pharmacy appointment    Other referrals                  Treatment Plan Information   [facility] [id number] ARM C ADJUVANT NIVOLUMAB STEP 2   Miki Medrano MD   Upcoming Treatment Dates - [facility] [id number] ARM C ADJUVANT NIVOLUMAB STEP 2    10/16/2023       Chemotherapy       INV nivolumab 480 mg in INV sodium chloride 0.9 % 100 mL chemo infusion  11/13/2023       Chemotherapy       INV nivolumab 480 mg in INV sodium chloride 0.9 % 100 mL chemo infusion  12/11/2023       Chemotherapy       INV nivolumab 480 mg in INV sodium chloride 0.9 % 100 mL chemo infusion  1/8/2024        Chemotherapy       INV nivolumab 480 mg in INV sodium chloride 0.9 % 100 mL chemo infusion    Supportive Plan Information  IV FLUIDS AND ELECTROLYTES   Miki Medrano MD   Upcoming Treatment Dates - IV FLUIDS AND ELECTROLYTES    No upcoming days in selected categories.    Therapy Plan Information  Flushes  heparin, porcine (PF) 100 unit/mL injection flush 500 Units  500 Units, Intravenous, Every visit  sodium chloride 0.9% flush 10 mL  10 mL, Intravenous, Every visit

## 2023-10-16 NOTE — PLAN OF CARE
1225-Pt tolerated INV Nivolumab infusion well, no complications or side effects, POC and meds discussed with pt, pt aware of upcoming appts, pt knows to call MD with any questions or concerns. Pt ambulated off unit, no distress noted.

## 2023-10-17 ENCOUNTER — CLINICAL SUPPORT (OUTPATIENT)
Dept: HEMATOLOGY/ONCOLOGY | Facility: CLINIC | Age: 68
End: 2023-10-17
Payer: MEDICARE

## 2023-10-17 ENCOUNTER — RESEARCH ENCOUNTER (OUTPATIENT)
Dept: RESEARCH | Facility: HOSPITAL | Age: 68
End: 2023-10-17
Payer: MEDICARE

## 2023-10-17 DIAGNOSIS — C15.9 ESOPHAGEAL ADENOCARCINOMA: Primary | ICD-10-CM

## 2023-10-17 DIAGNOSIS — Z71.3 NUTRITIONAL COUNSELING: Primary | ICD-10-CM

## 2023-10-17 DIAGNOSIS — E44.0 MODERATE MALNUTRITION: ICD-10-CM

## 2023-10-17 DIAGNOSIS — C15.9 ESOPHAGEAL ADENOCARCINOMA: ICD-10-CM

## 2023-10-17 DIAGNOSIS — Z00.6 CLINICAL TRIAL PARTICIPANT: ICD-10-CM

## 2023-10-17 PROCEDURE — 97803 PR MED NUTR THER, SUBSQ, INDIV, EA 15 MIN: ICD-10-PCS | Mod: 95,,, | Performed by: DIETITIAN, REGISTERED

## 2023-10-17 PROCEDURE — 97803 MED NUTRITION INDIV SUBSEQ: CPT | Mod: 95,,, | Performed by: DIETITIAN, REGISTERED

## 2023-10-17 NOTE — PROGRESS NOTES
": URIEL Manriquez MD  Treating Investigators: NIRAV Medrano MD  Surgical Oncologist: SARAH Brown M.D. 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 Patient queried & verbalized his consent for continued participation in above-mentioned trial. Patient queried and continues to report dysgeusia, fatigue  pruritic dermatis in his chest, upper back & scalp that is controlled with the topical cream. Patient reports tripping & falling last month, specified as [id number] in the internal medicine note. Patient's shoulder left MRI on 11Oct2023 notes "Rotator cuff tendinosis with superior humeral chondral osteophyte producing high-grade partial-thickness articular sided supraspinatus tendon tear (approximately 5 x 5 mm).  Additional small bursal sided partial-thickness insertional tear. Glenohumeral osteoarthritis with SLAP tear and subchondral cyst formation versus intraosseous extension of paralabral cyst". Patient started Tramadol & Tizanidine PRN. No other ConMed changes.    Patient completed safety labs, VS, PE & ECOG (ECOG = 0, per Dr. Medrano) today per protocol. Dr. Medrano assessed all patient's labs, VS & PE findings as either WNL or NCS, deeming patient eligible to continue treatment with Nivolumab monotherapy.     Weight:  Step 1 Week 1 was 117.2 kg  Step 2 C1D1 was 99.2 kg   ( +/- 10% = 89.3 - 109.2 kg).   Today's weight is 83.3 kg   >20% weight loss since Step 1 Week 1 --> >10% - < 20%change from Step 2 Cycle 1.      Per protocol, Nivolumab is administered at a 480 mg flat dose " & cannot be modified. CRC & Dr. Medrano performed time-out in exam room.     Infusion RN Carmen Daniels was instructed to administer the treatment per the treatment plan with full sets of vital signs pre- and post-dose. RN expressed their understanding of all instructions. Patient completed treatment today without event & was DC'd from clinic ambulatory and in stable condition. Please see Infusion RN note for further information.     Patient was encouraged to contact [facility] or Dr. Medrano's team with any questions or concerns & was reminded of clinic's 24/7 emergency contact number. Patient verbalized understanding & denies any additional questions at this time.        Step 2 -- C8D1 - [id number]:  [facility] 2nd floor @ 0900  [facility] 2nd floor @ 1430  infusion (Nivolumab only) @ 1500        Solicited AEs -- Assessed 21Aug2023:  ** Anemia - Grade 1 -- (NCS per MD) -- continued from prior visit  ** Platelet count decreased - Grade 1 -- 9/18/2023 (NCS per MD) -- continued from prior visit  **White blood cell count decreased - Grade 1 -- 10/16/2023  (NCS per MD)  Neutrophil count decreased - Grade 0  ** Lymphocyte count decreased - Grade 3   (NCS per MD)   10/16/2023 = Grade 3 -- NCS per MD  Peripheral motor neuropathy - Grade 0   Peripheral sensory neuropathy - Grade 0   Creatinine increased - Grade 0  Hypothyroidism - Grade 0   (TSH WNL on 16Oct2023)  Diarrhea (patient baseline BM = 2 stools a day) - Grade 0 -- reports loose stools vs. diarrhea   **Fatigue - Grade 2     10/16/2023 = Grade 1  (NCS per MD)  **Nausea - Grade 1 - 21Apr2023  (NCS per MD)  **Cough - Grade 1  -- Medical History  Pneumonitis - Grade 0  **Hyperglycemia - Grade 1 -- (Medical History of DMT2 was ended when medications DC'd) -- 8/21/2023  (NCS per MD)  Adrenal Insufficiency - Grade 0 -- 10/16/2023 ACTH & cortisol WNL  **Rash-maculo-papular - Grade 1 - 17Jul2023 - ongoing ( used Sarna [camphor 0.5% - menthol 0.5&] PRN -- Prescribed triamcinolone  [id number] ) -- (NCS per MD)  **Pruritis - Grade 1 - [id number] -- (NCS per MD)  Erythema multiforme - Grade 0  Vomiting - Grade 0    Lipase increased -- Not required per protocol and therefore not assessed this visit.      Ongoing Non-Solicited AEs:  Anorexia - Grade 1 - [id number] - ongoing (no treatment)  Hoarseness - Grade 2 - 14Mar2023 - ongoing ( no treatment )  Dysphagia - Grade 1 - [id number] - ongoing ( no treatment )  Weight loss - Grade 2 - [id number] - ongoing ( no treatment )      New or Changed Non-Solicited AEs Since Last Visit:  Dysgeusia - Grade 1 - 14Mar2023 -  8/21/2023  ( no treatment )  Rotator cuff injury - Grade 1 -- 9/18/2023 - ongoing  ( PRN Tramadol & Tizanidine )       Complete Baseline Medical History, Adverse Events, and Concomitant Medications are located in patient's shadow chart

## 2023-10-18 ENCOUNTER — TELEPHONE (OUTPATIENT)
Dept: INTERNAL MEDICINE | Facility: CLINIC | Age: 68
End: 2023-10-18
Payer: MEDICARE

## 2023-10-18 NOTE — TELEPHONE ENCOUNTER
----- Message from Prachi Goss sent at 10/18/2023  1:26 PM CDT -----  Contact: [phone]  Pt is requesting a callback on today in regards to if he should have his Colonoscopy on Friday. Per pt, he would like to be advised due to his recent issues within the last 8 months. Please call as soon as possible, per pt.           Thank you

## 2023-10-19 ENCOUNTER — TELEPHONE (OUTPATIENT)
Dept: ENDOSCOPY | Facility: HOSPITAL | Age: 68
End: 2023-10-19
Payer: MEDICARE

## 2023-10-19 ENCOUNTER — TELEPHONE (OUTPATIENT)
Dept: INTERNAL MEDICINE | Facility: CLINIC | Age: 68
End: 2023-10-19
Payer: MEDICARE

## 2023-10-19 NOTE — TELEPHONE ENCOUNTER
Received patient's call to cancel procedure and reschedule. Patient is requesting to be rescheduled only at the main campus. Informed patient that there are no availability at the main campus at this time. Will reach out to patient when February opens up. Patient verbalized an understanding.

## 2023-10-19 NOTE — TELEPHONE ENCOUNTER
Returned pt call, advised per Dr Wilson's notes that he should proceed as scheduled with colonscopy. Instructed to call for further questions or concerns

## 2023-10-19 NOTE — TELEPHONE ENCOUNTER
Spoke to patient to reschedule procedure(s) Colonoscopy       Physician to perform procedure(s) Dr. STEPHANIE Duong  Date of Procedure (s) 10/26/23  Arrival Time 8:15 AM  Time of Procedure(s) 9:15 AM   Location of Procedure(s) [facility] 4th Floor  Type of Rx Prep sent to patient: PEG  Instructions provided to patient via [facility]    Patient was informed on the following information and verbalized understanding. Screening questionnaire reviewed with patient and complete. If procedure requires anesthesia, a responsible adult needs to be present to accompany the patient home, patient cannot drive after receiving anesthesia. Appointment details are tentative, especially check-in time. Patient will receive a prep-op call 4 days prior to confirm check-in time for procedure. If applicable the patient should contact their pharmacy to verify Rx for procedure prep is ready for pick-up. Patient was advised to call the scheduling department at [phone] if pharmacy states no Rx is available. Patient was advised to call the endoscopy scheduling department if any questions or concerns arise.      [facility] Scheduling Department

## 2023-10-19 NOTE — TELEPHONE ENCOUNTER
Attempted to call pt. Vm left for pt to call endoscopy department to reschedule at his earliest convenience. Number given for endoscopy dept. Instructed to call for questions or concerns

## 2023-10-19 NOTE — TELEPHONE ENCOUNTER
Patient calling to reschedule colonoscopy. Patient is requesting to be rescheduled only at the main campus. Informed patient that there are no availability at the main campus at this time. Will reach out to patient when February opens up. Patient verbalized an understanding.

## 2023-10-19 NOTE — TELEPHONE ENCOUNTER
----- Message from Brody Tan sent at 10/19/2023  9:28 AM CDT -----  Contact: [phone]  [email or web address]     Patient is calling for Medical Advice regarding: he has canceled tomorrows ENDOSCOPY    How long has patient had these symptoms:    Pharmacy name and phone#:    Would like response via [employer]: call back     Comments:

## 2023-10-24 ENCOUNTER — TELEPHONE (OUTPATIENT)
Dept: ENDOSCOPY | Facility: HOSPITAL | Age: 68
End: 2023-10-24
Payer: MEDICARE

## 2023-10-26 ENCOUNTER — ANESTHESIA (OUTPATIENT)
Dept: ENDOSCOPY | Facility: HOSPITAL | Age: 68
End: 2023-10-26
Payer: MEDICARE

## 2023-10-26 ENCOUNTER — ANESTHESIA EVENT (OUTPATIENT)
Dept: ENDOSCOPY | Facility: HOSPITAL | Age: 68
End: 2023-10-26
Payer: MEDICARE

## 2023-10-26 ENCOUNTER — HOSPITAL ENCOUNTER (OUTPATIENT)
Facility: HOSPITAL | Age: 68
Discharge: HOME | End: 2023-10-26
Attending: INTERNAL MEDICINE | Admitting: INTERNAL MEDICINE
Payer: MEDICARE

## 2023-10-26 VITALS
OXYGEN SATURATION: 99 % | HEART RATE: 50 BPM | TEMPERATURE: 98 F | BODY MASS INDEX: 24.65 KG/M2 | DIASTOLIC BLOOD PRESSURE: 68 MMHG | RESPIRATION RATE: 18 BRPM | HEIGHT: 72 IN | SYSTOLIC BLOOD PRESSURE: 153 MMHG | WEIGHT: 182 LBS

## 2023-10-26 DIAGNOSIS — Z12.11 SCREENING FOR COLON CANCER: ICD-10-CM

## 2023-10-26 LAB — POCT GLUCOSE: 93 MG/DL (ref 70–110)

## 2023-10-26 PROCEDURE — G0105 COLORECTAL SCRN; HI RISK IND: HCPCS | Mod: ,,, | Performed by: INTERNAL MEDICINE

## 2023-10-26 PROCEDURE — E9220 PRA ENDO ANESTHESIA: HCPCS | Mod: ,,, | Performed by: STUDENT IN AN ORGANIZED HEALTH CARE EDUCATION/TRAINING PROGRAM

## 2023-10-26 PROCEDURE — 82962 GLUCOSE BLOOD TEST: CPT | Performed by: INTERNAL MEDICINE

## 2023-10-26 PROCEDURE — 25000003 PHARM REV CODE 250: Performed by: INTERNAL MEDICINE

## 2023-10-26 PROCEDURE — 37000009 HC ANESTHESIA EA ADD 15 MINS: Performed by: INTERNAL MEDICINE

## 2023-10-26 PROCEDURE — 37000008 HC ANESTHESIA 1ST 15 MINUTES: Performed by: INTERNAL MEDICINE

## 2023-10-26 PROCEDURE — 63600175 PHARM REV CODE 636 W HCPCS: Performed by: STUDENT IN AN ORGANIZED HEALTH CARE EDUCATION/TRAINING PROGRAM

## 2023-10-26 PROCEDURE — G0105 COLORECTAL SCRN; HI RISK IND: ICD-10-PCS | Mod: ,,, | Performed by: INTERNAL MEDICINE

## 2023-10-26 PROCEDURE — G0105 COLORECTAL SCRN; HI RISK IND: HCPCS | Performed by: INTERNAL MEDICINE

## 2023-10-26 PROCEDURE — E9220 PRA ENDO ANESTHESIA: ICD-10-PCS | Mod: ,,, | Performed by: STUDENT IN AN ORGANIZED HEALTH CARE EDUCATION/TRAINING PROGRAM

## 2023-10-26 PROCEDURE — 25000003 PHARM REV CODE 250: Performed by: STUDENT IN AN ORGANIZED HEALTH CARE EDUCATION/TRAINING PROGRAM

## 2023-10-26 RX ORDER — LIDOCAINE HYDROCHLORIDE 20 MG/ML
INJECTION INTRAVENOUS
Status: DISCONTINUED | OUTPATIENT
Start: 2023-10-26 | End: 2023-10-26

## 2023-10-26 RX ORDER — PROPOFOL 10 MG/ML
VIAL (ML) INTRAVENOUS CONTINUOUS PRN
Status: DISCONTINUED | OUTPATIENT
Start: 2023-10-26 | End: 2023-10-26

## 2023-10-26 RX ORDER — PROPOFOL 10 MG/ML
VIAL (ML) INTRAVENOUS
Status: DISCONTINUED | OUTPATIENT
Start: 2023-10-26 | End: 2023-10-26

## 2023-10-26 RX ORDER — SODIUM CHLORIDE 9 MG/ML
INJECTION, SOLUTION INTRAVENOUS CONTINUOUS
Status: DISCONTINUED | OUTPATIENT
Start: 2023-10-26 | End: 2023-10-26 | Stop reason: HOSPADM

## 2023-10-26 RX ADMIN — PROPOFOL 50 MG: 10 INJECTION, EMULSION INTRAVENOUS at 09:10

## 2023-10-26 RX ADMIN — SODIUM CHLORIDE: 0.9 INJECTION, SOLUTION INTRAVENOUS at 09:10

## 2023-10-26 RX ADMIN — LIDOCAINE HYDROCHLORIDE 50 MG: 20 INJECTION INTRAVENOUS at 09:10

## 2023-10-26 RX ADMIN — PROPOFOL 150 MCG/KG/MIN: 10 INJECTION, EMULSION INTRAVENOUS at 09:10

## 2023-10-26 NOTE — TRANSFER OF CARE
Anesthesia Transfer of Care Note    Patient: Lukasz Person    Procedure(s) Performed: Procedure(s) (LRB):  COLONOSCOPY (N/A)    Patient location: PACU    Anesthesia Type: general    Transport from OR: Transported from OR on room air with adequate spontaneous ventilation    Post pain: adequate analgesia    Post assessment: no apparent anesthetic complications    Post vital signs: stable    Level of consciousness: awake    Nausea/Vomiting: no nausea/vomiting    Complications: none    Transfer of care protocol was followed      Last vitals:   Visit Vitals  BP (!) 167/74 (BP Location: Left arm, Patient Position: Lying)   Pulse (!) 51   Temp 36.7 °C (98.1 °F) (Temporal)   Resp 18   Ht 6' (1.829 m)   Wt 82.6 kg (182 lb)   SpO2 100%   BMI 24.68 kg/m²

## 2023-10-26 NOTE — PROVATION PATIENT INSTRUCTIONS
Discharge Summary/Instructions after an Endoscopic Procedure  Patient Name: Lukasz Person  Patient MRN: [id number]  Patient YOB: 1955  Thursday, October 26, 2023  Noah Duong MD  Dear patient,  As a result of recent federal legislation (The Federal Cures Act), you may   receive lab or pathology results from your procedure in your [facility]   account before your physician is able to contact you. Your physician or   their representative will relay the results to you with their   recommendations at their soonest availability.  Thank you,  RESTRICTIONS:  During your procedure today, you received medications for sedation.  These   medications may affect your judgment, balance and coordination.  Therefore,   for 24 hours, you have the following restrictions:   - DO NOT drive a car, operate machinery, make legal/financial decisions,   sign important papers or drink alcohol.    ACTIVITY:  Today: no heavy lifting, straining or running due to procedural   sedation/anesthesia.  The following day: return to full activity including work.  DIET:  Eat and drink normally unless instructed otherwise.     TREATMENT FOR COMMON SIDE EFFECTS:  - Mild abdominal pain, nausea, belching, bloating or excessive gas:  rest,   eat lightly and use a heating pad.  - Sore Throat: treat with throat lozenges and/or gargle with warm salt   water.  - Because air was used during the procedure, expelling large amounts of air   from your rectum or belching is normal.  - If a bowel prep was taken, you may not have a bowel movement for 1-3 days.    This is normal.  SYMPTOMS TO WATCH FOR AND REPORT TO YOUR PHYSICIAN:  1. Abdominal pain or bloating, other than gas cramps.  2. Chest pain.  3. Back pain.  4. Signs of infection such as: chills or fever occurring within 24 hours   after the procedure.  5. Rectal bleeding, which would show as bright red, maroon, or black stools.   (A tablespoon of blood from the rectum is not serious, especially  if   hemorrhoids are present.)  6. Vomiting.  7. Weakness or dizziness.  GO DIRECTLY TO THE NEAREST EMERGENCY ROOM IF YOU HAVE ANY OF THE FOLLOWING:      Difficulty breathing              Chills and/or fever over 101 F   Persistent vomiting and/or vomiting blood   Severe abdominal pain   Severe chest pain   Black, tarry stools   Bleeding- more than one tablespoon   Any other symptom or condition that you feel may need urgent attention  Your doctor recommends these additional instructions:  If any biopsies were taken, your doctors clinic will contact you in 1 to 2   weeks with any results.  - Discharge patient to home.   - Resume previous diet today.   - Continue present medications.   - Repeat colonoscopy in 5 years for surveillance.   - Return to referring physician as previously scheduled.   - Patient has a contact number available for emergencies.  The signs and   symptoms of potential delayed complications were discussed with the   patient.  Return to normal activities tomorrow.  Written discharge   instructions were provided to the patient.  For questions, problems or results please call your physician - Noah Duong MD at Work:  [phone].  [facility], EMERGENCY ROOM PHONE NUMBER: [phone]  IF A COMPLICATION OR EMERGENCY SITUATION ARISES AND YOU ARE UNABLE TO REACH   YOUR PHYSICIAN - GO DIRECTLY TO THE EMERGENCY ROOM.  Noah Duong MD  10/26/2023 10:18:37 AM  This report has been verified and signed electronically.  Dear patient,  As a result of recent federal legislation (The Federal Cures Act), you may   receive lab or pathology results from your procedure in your [facility]   account before your physician is able to contact you. Your physician or   their representative will relay the results to you with their   recommendations at their soonest availability.  Thank you,  PROVATION

## 2023-10-26 NOTE — PLAN OF CARE
Patient to procedure room 6 via stretcher. All vital signs, medications, IV fluids and sedation per CRNA. See anesthesia notes. Patient's belongings under stretcher.

## 2023-10-26 NOTE — H&P
Short Stay Endoscopy History and Physical    PCP - Kirk Wilson MD    Procedure - Colonoscopy  ASA - per anesthesia  Mallampati - per anesthesia  History of Anesthesia problems - no  Family history Anesthesia problems - no   Plan of anesthesia - General    HPI:  This is a 68 y.o. male here for evaluation of : family history of colon cancer (brother, diagnosed in 30's) and personal history of colon polyps      ROS:  Constitutional: No fevers, chills, No weight loss  CV: No chest pain  Pulm: No cough, No shortness of breath  GI: see HPI  Derm: No rash    Medical History:  has a past medical history of Anticoagulant long-term use, Cancer, Diabetes mellitus, Hyperlipidemia, Hypertension, Kidney stones, and Sleep apnea.    Surgical History:  has a past surgical history that includes Lithotripsy; Parathyroidectomy (1/1/2-107); Left heart catheterization (Left, 9/30/2020); Coronary angiography (N/A, 9/30/2020); Cystoscopy (N/A, 2/17/2022); Ureteroscopic removal of ureteric calculus (Left, 2/25/2022); Laser lithotripsy (Left, 2/25/2022); Cystoscopy w/ ureteral stent placement (N/A, 2/25/2022); Pyeloscopy (Left, 2/25/2022); Ureteroscopy (Left, 2/25/2022); Treatment of cardiac arrhythmia (N/A, 4/7/2022); Transesophageal echocardiography (N/A, 4/7/2022); Esophagogastroduodenoscopy (N/A, 11/17/2022); Endoscopic ultrasound of upper gastrointestinal tract (N/A, 12/7/2022); Robot-assisted surgical removal of esophagus using da Pool Xi (N/A, 3/14/2023); robotic jejunostomy (N/A, 3/14/2023); Vocal cord injection (Left, 3/17/2023); and Esophagogastroduodenoscopy (N/A, 5/31/2023).    Family History: family history includes Alcohol abuse in his paternal aunt; Arthritis in his brother, father, mother, sister, and sister; Cancer in his maternal grandfather and paternal grandfather; Colon cancer in his paternal grandfather; Colon cancer (age of onset: 32) in his brother; Colon polyps in his paternal grandfather; Diabetes in his father  and paternal grandmother; Heart disease (age of onset: 70) in his father; Hypertension in his sister; Kidney disease in his father.. Otherwise no colon cancer, inflammatory bowel disease, or GI malignancies.    Social History:  reports that he quit smoking about 28 years ago. His smoking use included cigarettes and cigars. He started smoking about 51 years ago. He has a 22.7 pack-year smoking history. He has never been exposed to tobacco smoke. He has never used smokeless tobacco. He reports current alcohol use of about 4.0 standard drinks of alcohol per week. He reports that he does not currently use drugs after having used the following drugs: Marijuana.    Review of patient's allergies indicates:  No Known Allergies    Medications:   Medications Prior to Admission   Medication Sig Dispense Refill Last Dose    allopurinoL (ZYLOPRIM) 100 MG tablet TAKE 1 TABLET BY MOUTH EVERY DAY 30 tablet 10 10/25/2023    citric acid-potassium citrate (POLYCITRA) 1,100-334 mg/5 mL solution Take 10 mLs (20 mEq total) by mouth 2 (two) times a day. 600 mL 5 10/25/2023    hydroCHLOROthiazide (HYDRODIURIL) 25 MG tablet Take 1 tablet (25 mg total) by mouth once daily. 30 tablet 11 10/25/2023    metoprolol succinate (TOPROL-XL) 25 MG 24 hr tablet Take 0.5 tablets (12.5 mg total) by mouth once daily. 15 tablet 11 10/25/2023    omeprazole (PRILOSEC) 40 MG capsule Take 1 capsule (40 mg total) by mouth once daily. 90 capsule 3 10/25/2023    rosuvastatin (CRESTOR) 20 MG tablet Take 1 tablet (20 mg total) by mouth every evening. 90 tablet 3 10/25/2023    tamsulosin (FLOMAX) 0.4 mg Cap TAKE 1 CAPSULE(0.4 MG) BY MOUTH EVERY DAY 30 capsule 2 10/25/2023    testosterone (ANDROGEL) 20.25 mg/1.25 gram (1.62 %) GlPm Apply 4 pumps to shoulders daily 2 each 5 10/25/2023    traMADoL (ULTRAM) 50 mg tablet Take 1 tablet (50 mg total) by mouth every 8 (eight) hours as needed for Pain. 21 tablet 0 Past Month    blood sugar diagnostic (ACCU-CHEK ANTONELLA PLUS  TEST STRP) Strp Use to test CBG four times daily E11.65 400 strip 3     blood-glucose meter kit Checks blood sugars 1x/daily. 1 each 12     lancets (ACCU-CHEK SOFTCLIX LANCETS) Misc Use to test CBG 4 times daily E11.65 400 each 1     nitroGLYCERIN (NITROSTAT) 0.4 MG SL tablet Place 1 tablet (0.4 mg total) under the tongue every 5 (five) minutes as needed for Chest pain. If you need a third tablet, call 911 60 tablet 12 Unknown    rivaroxaban (XARELTO) 20 mg Tab Take 1 tablet (20 mg total) by mouth daily with dinner or evening meal. 90 tablet 3 10/23/2023    testosterone cypionate (DEPOTESTOTERONE CYPIONATE) 200 mg/mL injection INJECT 2ML INTRAMUSCULARLY ONCE A MONTH AS DIRECTED       tiZANidine (ZANAFLEX) 4 MG tablet Take 1 tablet (4 mg total) by mouth every 8 (eight) hours as needed. 90 tablet 0 Unknown    triamcinolone acetonide 0.1% (KENALOG) 0.1 % cream Apply topically 2 (two) times daily. 45 g 1          Physical Exam:    Vital Signs:   Vitals:    10/26/23 0905   BP: (!) 167/74   Pulse: (!) 51   Resp: 18   Temp: 98.1 °F (36.7 °C)       General Appearance: Well appearing in no acute distress  Eyes:    No scleral icterus  ENT: Neck supple, Lips, mucosa, and tongue normal; teeth and gums normal  Abdomen: Soft, non tender, non distended with positive bowel sounds. No hepatosplenomegaly, ascites, or mass.  Extremities: 2+ pulses, no clubbing, cyanosis or edema  Skin: No rash      Labs:  Lab Results   Component Value Date    WBC 3.29 (L) 10/16/2023    HGB 13.0 (L) 10/16/2023    HCT 40.5 10/16/2023     (L) 10/16/2023    CHOL 107 (L) 11/07/2022    TRIG 218 (H) 11/07/2022    HDL 34 (L) 11/07/2022    ALT 22 10/16/2023    AST 22 10/16/2023     10/16/2023     10/16/2023    K 3.9 10/16/2023    K 4.0 10/16/2023     10/16/2023     10/16/2023    CREATININE 0.9 10/16/2023    CREATININE 0.9 10/16/2023    BUN 15 10/16/2023    BUN 16 10/16/2023    CO2 26 10/16/2023    CO2 27 10/16/2023    TSH 0.973  10/16/2023    PSA 0.94 10/16/2023    INR 1.1 04/01/2022    HGBA1C 6.2 (H) 05/29/2023       I have explained the risks and benefits of endoscopy procedures to the patient including but not limited to bleeding, perforation, infection, and death.  The patient was asked if they understand and allowed to ask any further questions to their satisfaction.    Sahara Varghese, DO

## 2023-10-26 NOTE — ANESTHESIA PREPROCEDURE EVALUATION
10/26/2023  Lukasz Person is a 68 y.o., male.  Patient Active Problem List   Diagnosis    Type 2 diabetes mellitus with kidney complication, without long-term current use of insulin    KUMAR on CPAP    Gout    Essential hypertension    Hyperparathyroidism, primary    Hyperlipidemia associated with type 2 diabetes mellitus    Obesity, diabetes, and hypertension syndrome    DM type 2 without retinopathy    Diabetes mellitus with cataract    Male hypogonadism    Coronary artery disease involving native coronary artery of native heart    Low serum alkaline phosphatase    BPH with urinary obstruction    Erectile dysfunction due to arterial insufficiency    Nephrolithiasis    Paroxysmal atrial fibrillation    Chronic anticoagulation    HFrEF (heart failure with reduced ejection fraction)    Non-ischemic cardiomyopathy    CKD stage 3 due to type 2 diabetes mellitus    Esophageal adenocarcinoma    Aortic atherosclerosis    Left true vocal fold immobility and bowing    Immunodeficiency secondary to neoplasm    Port-A-Cath in place    Moderate malnutrition    Physical deconditioning    Stress and adjustment reaction    Imbalance    Stage 2 chronic kidney disease     Past Medical History:   Diagnosis Date    Anticoagulant long-term use     Cancer     Diabetes mellitus     Onset late 50s/early 60s    Hyperlipidemia     Hypertension     Onset late 50s/early 60s    Kidney stones     Sleep apnea     since 2006     Past Surgical History:   Procedure Laterality Date    CORONARY ANGIOGRAPHY N/A 9/30/2020    Procedure: ANGIOGRAM, CORONARY ARTERY;  Surgeon: John West MD;  Location: [facility] CATH LAB;  Service: Cardiology;  Laterality: N/A;    CYSTOSCOPY N/A 2/17/2022    Procedure: CYSTOSCOPY;  Surgeon: Lukasz Hughes MD;  Location: [facility] OR [address] [place] [address];  Service: Urology;  Laterality:  N/A;    CYSTOSCOPY W/ URETERAL STENT PLACEMENT N/A 2/25/2022    Procedure: CYSTOSCOPY, WITH URETERAL STENT INSERTION;  Surgeon: Lukasz Hughes MD;  Location: [facility] OR 1ST FLR;  Service: Urology;  Laterality: N/A;    ENDOSCOPIC ULTRASOUND OF UPPER GASTROINTESTINAL TRACT N/A 12/7/2022    Procedure: ULTRASOUND, UPPER GI TRACT, ENDOSCOPIC;  Surgeon: Darian Main MD;  Location: [facility] ENDO;  Service: Endoscopy;  Laterality: N/A;  Approval to hold Xarelto rec'd from Dr. Nick (see t/e 12/5/22)-DS    ESOPHAGOGASTRODUODENOSCOPY N/A 11/17/2022    Procedure: EGD (ESOPHAGOGASTRODUODENOSCOPY);  Surgeon: Brody Gonzales MD;  Location: [facility] ENDO (2ND FLR);  Service: Endoscopy;  Laterality: N/A;  inst via email-ok to hold Xarelto x 2 days-MS    ESOPHAGOGASTRODUODENOSCOPY N/A 5/31/2023    Procedure: EGD (ESOPHAGOGASTRODUODENOSCOPY) WITH DILATION;  Surgeon: Santana Brown Jr., MD;  [facility];  Service: General;  Laterality: N/A;    LASER LITHOTRIPSY Left 2/25/2022    Procedure: LITHOTRIPSY, USING LASER;  Surgeon: Lukasz Hughes MD;  [facility];  Service: Urology;  Laterality: Left;    LEFT HEART CATHETERIZATION Left 9/30/2020    Procedure: Left heart cath;  Surgeon: John West MD;  Location: [facility] CATH LAB;  Service: Cardiology;  Laterality: Left;    LITHOTRIPSY      PARATHYROIDECTOMY  1/1/2-107    PYELOSCOPY Left 2/25/2022    Procedure: PYELOSCOPY;  Surgeon: Lukasz Hughes MD;  Location: [facility] OR 1ST FLR;  Service: Urology;  Laterality: Left;    ROBOT-ASSISTED SURGICAL REMOVAL OF ESOPHAGUS USING DA CARLOS XI N/A 3/14/2023    Procedure: XI ROBOTIC ESOPHAGECTOMY;  Surgeon: Santana Brown Jr., MD;  Location: [facility] OR 2ND FLR;  Service: General;  Laterality: N/A;  Abdomen, Chest and Neck    ROBOTIC JEJUNOSTOMY N/A 3/14/2023    Procedure: ROBOTIC JEJUNOSTOMY TUBE INSERTION;  Surgeon: Santana Brown Jr., MD;  Location: [facility] OR [address] [place] [address];  Service: General;  Laterality:  N/A;    TRANSESOPHAGEAL ECHOCARDIOGRAPHY N/A 4/7/2022    Procedure: ECHOCARDIOGRAM, TRANSESOPHAGEAL;  Surgeon: Xander Diagnostic Provider;  Location: [facility] EP LAB;  Service: Cardiology;  Laterality: N/A;    TREATMENT OF CARDIAC ARRHYTHMIA N/A 4/7/2022    Procedure: Cardioversion or Defibrillation;  Surgeon: Iris Fisher NP;  Location: [facility] EP LAB;  Service: Cardiology;  Laterality: N/A;  afib, dccv, artie, anes, EH, 3prep    URETEROSCOPIC REMOVAL OF URETERIC CALCULUS Left 2/25/2022    Procedure: REMOVAL, CALCULUS, URETER, URETEROSCOPIC;  Surgeon: Lukasz Hughes MD;  Location: [facility] OR 1ST FLR;  Service: Urology;  Laterality: Left;    URETEROSCOPY Left 2/25/2022    Procedure: URETEROSCOPY;  Surgeon: Lukasz Hughes MD;  Location: [facility] OR 1ST FLR;  Service: Urology;  Laterality: Left;    VOCAL CORD INJECTION Left 3/17/2023    Procedure: INJECTION, VOCAL CORD, LARYNGOSCOPIC;  Surgeon: Gm Altman MD;  Location: [facility] OR 2ND FLR;  Service: ENT;  Laterality: Left;           Pre-op Assessment    I have reviewed the Patient Summary Reports.     I have reviewed the Nursing Notes. I have reviewed the NPO Status.   I have reviewed the Medications.     Review of Systems  Anesthesia Hx:  No problems with previous Anesthesia  Denies Family Hx of Anesthesia complications.   Denies Personal Hx of Anesthesia complications.   Social:  Non-Smoker, No Alcohol Use    Cardiovascular:   Exercise tolerance: good Hypertension, well controlled CAD asymptomatic     Psych:   Psychiatric History          Physical Exam  General: Well nourished, Cooperative, Alert and Oriented    Airway:  Mallampati: II / II  Mouth Opening: Normal  TM Distance: Normal  Tongue: Normal  Neck ROM: Normal ROM    Dental:  Intact    Chest/Lungs:  Clear to auscultation, Normal Respiratory Rate    Heart:  Rate: Normal  Rhythm: Regular Rhythm  Sounds: Normal    Abdomen:  Normal, Soft, Nontender        Anesthesia Plan  Type of Anesthesia, risks & benefits  discussed:    Anesthesia Type: Gen Natural Airway  Intra-op Monitoring Plan: Standard ASA Monitors  Post Op Pain Control Plan: multimodal analgesia  Induction:  IV  Informed Consent: Informed consent signed with the Patient and all parties understand the risks and agree with anesthesia plan.  All questions answered. Patient consented to blood products? No  ASA Score: 4  Day of Surgery Review of History & Physical: H&P Update referred to the surgeon/provider.    Ready For Surgery From Anesthesia Perspective.     .

## 2023-10-26 NOTE — ANESTHESIA POSTPROCEDURE EVALUATION
Anesthesia Post Evaluation    Patient: Lukasz Person    Procedure(s) Performed: Procedure(s) (LRB):  COLONOSCOPY (N/A)    Final Anesthesia Type: general      Patient location during evaluation: GI PACU  Patient participation: Yes- Able to Participate  Level of consciousness: awake and alert  Post-procedure vital signs: reviewed and stable  Pain management: adequate  Airway patency: patent    PONV status at discharge: No PONV  Anesthetic complications: no      Cardiovascular status: stable  Respiratory status: unassisted and spontaneous ventilation  Hydration status: euvolemic  Follow-up not needed.          Vitals Value Taken Time   /68 10/26/23 1045   Temp 36.5 °C (97.7 °F) 10/26/23 1020   Pulse 50 10/26/23 1045   Resp 18 10/26/23 1045   SpO2 99 % 10/26/23 1045         Event Time   Out of Recovery 10:50:46         Pain/Jeovanny Score: Jeovanny Score: 10 (10/26/2023 10:22 AM)

## 2023-10-27 ENCOUNTER — CLINICAL SUPPORT (OUTPATIENT)
Dept: REHABILITATION | Facility: OTHER | Age: 68
End: 2023-10-27
Payer: MEDICARE

## 2023-10-27 DIAGNOSIS — R26.89 IMBALANCE: Primary | ICD-10-CM

## 2023-10-27 DIAGNOSIS — R53.81 PHYSICAL DECONDITIONING: ICD-10-CM

## 2023-10-27 PROCEDURE — 97112 NEUROMUSCULAR REEDUCATION: CPT | Mod: PN | Performed by: PHYSICAL THERAPIST

## 2023-10-27 PROCEDURE — 97110 THERAPEUTIC EXERCISES: CPT | Mod: PN | Performed by: PHYSICAL THERAPIST

## 2023-10-27 NOTE — PROGRESS NOTES
[facility]   Physical Therapy Re-Evaluation and Treatment Note      Name: Lukasz Person  Clinic Number: [id number]    Therapy Diagnosis:   Encounter Diagnoses   Name Primary?    Imbalance Yes    Physical deconditioning          Physician: Kirk Wilson MD    Visit Date: 10/27/2023    Physician Orders: PT Eval and Treat   Medical Diagnosis from Referral: Imbalance  Evaluation Date: 2023  Authorization Period Expiration: 2024  Plan of Care Expiration: 10/30/2023  Progress Note Due: 2023  Visit # / Visits authorized: 5(+1 Eval)/ 20    PTA Visit #: 0/5      Precautions: Standard, Fall, and Immunosuppression      Time In: 905   Time Out: 956  Total Billable Time: 51 minutes    Subjective     Patient reports: pt notes that Left knee and shoulder are improved but left knee ROM remains limited with funcational activities  He was not compliant with home exercise program due to recovering from fall  Response to previous treatment:pt reports that he felt that he was progressing well  Functional change: pt with difficult  bending left leg during walking and sitting.    Objective      Objective Measures updated at progress report unless specified.   Bruising visible on medial and lateral Left knee.    Left Knee ROM:   Passive in supine: 92 degrees  AAROM in sittin degrees  *ROM limited by pain and muscle tightness in anticipation of pain.  Dicussed home use of ice and heat to improve pain    Shoulder discussed but not evaluated. Recommend pt f/u with Ortho for shoulder/RTC tear/SLAP lesion found on recent MRI    Treatment     Lukasz received the treatments listed below:      therapeutic exercises to develop strength, endurance, ROM, flexibility, posture, and core stabilization for 25 minutes including:  +Supine Heel Slides AAROM and assit with strap x 10  mins  +Bridges [id number]  +Seated LAQ AROM 15x2 sets  +Standing knee bends with left foot on chair 15x 2 sets with 3 sec  holds    Not performed today  NuStep 10 minutes (instruction on set up) seat @9 and arms at 11  +Seated Knee flexion AAROM/PROM  Supine  +Abimael SLR with 2# ankle weight 30x  Sidelying  +Clam bilateral 30x 0#       neuromuscular re-education activities to improve: Balance, Coordination, Kinesthetic, Sense, Proprioception, and Posture for 26 minutes. The following activities were included:  Standing:  +EC, FT firm surface head turns vert and horiz x 10 reps each  +EC, FT compliant surface head turns vert and horiz x 10 reps each  SLS on foam each side  +Semi squat side stepping x 40 feet x 4 reps; pt with c/o left LE feeling like it would give out    therapeutic activities to improve functional performance for 00  minutes, including:     gait training to improve functional mobility and safety for 00  minutes, including:      Patient Education and Home Exercises       Education provided:   - balance  -falls and safety    Written Home Exercises Provided: yes. Exercises were reviewed and Lukasz was able to demonstrate them prior to the end of the session.  Lukasz demonstrated good  understanding of the education provided. See Electronic Medical Record under Patient Instructions for exercises provided during therapy sessions    Assessment   Pt presents with limited mobility s/p fall nearly 1 month ago.  Pt unable to tolerate balance program at this time.  Will follow up with pt in 2 weeks in therapy to continue balance program.      This is a 68 y.o. male with a medical diagnosis of        Encounter Diagnoses   Name Primary?    Physical deconditioning Yes    Imbalance     Patient presents with impaired balance. Lukasz will benefit from skilled physical therapy intervention to address the above impairments and functional limitations. Pt would benefit from general strengthening and conditioning while including weightbearing balance activitites to improve safe functional mobility to meet the patient's goals for  therapy.    Lukasz Is progressing well towards his goals.   Patient prognosis is Excellent.     Patient will continue to benefit from skilled outpatient physical therapy to address the deficits listed in the problem list box on initial evaluation, provide pt/family education and to maximize pt's level of independence in the home and community environment.     Patient's spiritual, cultural and educational needs considered and pt agreeable to plan of care and goals.     Anticipated barriers to physical therapy: saima    The following goals were discussed with the patient and patient is in agreement with them as to be addressed in the treatment plan.     Goals:  STG'S: 3 weeks  1. Patient independent in HEP of balance and head-eye coordination.  Exercises to be done Daily.  2. Patient able to perform forward bending without LOB or c/o dizziness  3. Pt to improve Hughes by 4-6 points for improved safety with functional mobility     LTG'S  : 6 weeks  1. Patient able to ambulate in community safely without assistive device, on varied terrainwith head movement, without LOB or c/o dizziness.  2. Pt to be I with self management of condition and progression vestibular program for maintenance.  3. Pt to improve Hughes by 6-10 points for improved safety with functional mobility    Plan   Plan of care Certification: 8/25/2023 to 10/30/2023.     Pt with improvements in pain and ROM of Left LE. Tolerated return to balance and strengthening program.     Lukasz will be seen 2 times per weeks for the next 6 weeks for neuromuscular education, balance and coordinatoin training, therapeutic exercise, gait training, proprioception exercises, postural education, dynamic standing balance exercises, sensory organization activities, eye head coordination exercises , and and other therapeutic interventions as deemed necessary to address above goals.. Pt may be seen by a PTA as part of the Rehab Team.      Clarissa Renae, PT, DPT, MHA, CLT,  CEAS

## 2023-10-31 ENCOUNTER — PATIENT MESSAGE (OUTPATIENT)
Dept: HEMATOLOGY/ONCOLOGY | Facility: CLINIC | Age: 68
End: 2023-10-31
Payer: MEDICARE

## 2023-10-31 ENCOUNTER — CLINICAL SUPPORT (OUTPATIENT)
Dept: REHABILITATION | Facility: OTHER | Age: 68
End: 2023-10-31
Payer: MEDICARE

## 2023-10-31 DIAGNOSIS — R21 RASH: ICD-10-CM

## 2023-10-31 DIAGNOSIS — R26.89 IMBALANCE: Primary | ICD-10-CM

## 2023-10-31 PROCEDURE — 97110 THERAPEUTIC EXERCISES: CPT | Mod: PN | Performed by: PHYSICAL THERAPIST

## 2023-10-31 PROCEDURE — 97112 NEUROMUSCULAR REEDUCATION: CPT | Mod: PN | Performed by: PHYSICAL THERAPIST

## 2023-10-31 RX ORDER — TRIAMCINOLONE ACETONIDE 1 MG/G
CREAM TOPICAL 2 TIMES DAILY
Qty: 45 G | Refills: 1 | Status: SHIPPED | OUTPATIENT
Start: 2023-10-31 | End: 2024-02-05 | Stop reason: SDUPTHER

## 2023-10-31 NOTE — PROGRESS NOTES
[facility]   Physical Therapy Treatment Note      Name: Lukasz Person  Clinic Number: [id number]    Therapy Diagnosis:   Encounter Diagnosis   Name Primary?    Imbalance Yes           Physician: Kirk Wilson MD    Visit Date: 10/31/2023    Physician Orders: PT Eval and Treat   Medical Diagnosis from Referral: Imbalance  Evaluation Date: 2023  Authorization Period Expiration: 2024  Plan of Care Expiration: 10/30/2023  Progress Note Due: 2023  Visit # / Visits authorized: 6(+1 Eval)/ 20    PTA Visit #: 0/5      Precautions: Standard, Fall, and Immunosuppression      Time In: 1115   Time Out: 1210  Total Billable Time: 55 minutes    Subjective     Patient reports: pt notes that Left knee and shoulder are improved but left knee ROM remains limited with funcational activities  He was not compliant with home exercise program due to recovering from fall  Response to previous treatment:pt reports that he felt that he was progressing well  Functional change: pt with difficult  bending left leg during walking and sitting.    Objective      Objective Measures updated at progress report unless specified.   Bruising visible on medial and lateral Left knee.    Left Knee ROM:   Passive in supine: 104 degrees  AAROM in sittin degrees  *ROM limited by pain and muscle tightness in anticipation of pain.  Dicussed home use of ice and heat to improve pain    Shoulder discussed but not evaluated. Recommend pt f/u with Ortho for shoulder/RTC tear/SLAP lesion found on recent MRI    Treatment     Lukasz received the treatments listed below:      therapeutic exercises to develop strength, endurance, ROM, flexibility, posture, and core stabilization for  25 minutes including:  +Supine Heel Slides AAROM and assit with strap x 10  mins  +Bridges 30x  +Bridges with half foam roll under ankles x 15 reps  +Seated LAQ AROM 15 reps ronal.  Right LE with 3# ankle weight  +Standing knee bends with left  foot on chair 15x 2 sets with 3 sec holds  +Recumb bike x 10 minutes seat at 13 position for knee rocking; pt able to complete revolution with Left knee after 3 minute warm up    Not performed today  NuStep 10 minutes (instruction on set up) seat @9 and arms at 11  +Seated Knee flexion AAROM/PROM  Supine  +Abimael SLR with 2# ankle weight 30x  Sidelying  +Clam bilateral 30x 0#       neuromuscular re-education activities to improve: Balance, Coordination, Kinesthetic, Sense, Proprioception, and Posture for 30 minutes. The following activities were included:  Standing:  +EC, FT firm surface head turns vert and horiz x 10 reps each  +EC, FT compliant surface head turns vert and horiz x 10 reps each  SLS on foam RLE with HT and EC  +Semi squat side stepping x 40 feet x 4 reps  +Tandem walking with EO and EC    therapeutic activities to improve functional performance for 00  minutes, including:     gait training to improve functional mobility and safety for 00  minutes, including:      Patient Education and Home Exercises       Education provided:   - balance  -falls and safety    Written Home Exercises Provided: yes. Exercises were reviewed and Lukasz was able to demonstrate them prior to the end of the session.  Lukasz demonstrated good  understanding of the education provided. See Electronic Medical Record under Patient Instructions for exercises provided during therapy sessions    Assessment   Pt with improved knee ROM and activity tolerance. Continues to improve with functional balance    This is a 68 y.o. male with a medical diagnosis of        Encounter Diagnoses   Name Primary?    Physical deconditioning Yes    Imbalance     Patient presents with impaired balance. Lukasz will benefit from skilled physical therapy intervention to address the above impairments and functional limitations. Pt would benefit from general strengthening and conditioning while including weightbearing balance activitites to improve safe  functional mobility to meet the patient's goals for therapy.    Lukasz Is progressing well towards his goals.   Patient prognosis is Excellent.     Patient will continue to benefit from skilled outpatient physical therapy to address the deficits listed in the problem list box on initial evaluation, provide pt/family education and to maximize pt's level of independence in the home and community environment.     Patient's spiritual, cultural and educational needs considered and pt agreeable to plan of care and goals.     Anticipated barriers to physical therapy: saima    The following goals were discussed with the patient and patient is in agreement with them as to be addressed in the treatment plan.     Goals:  STG'S: 3 weeks  1. Patient independent in HEP of balance and head-eye coordination.  Exercises to be done Daily.  2. Patient able to perform forward bending without LOB or c/o dizziness  3. Pt to improve Hughes by 4-6 points for improved safety with functional mobility     LTG'S  : 6 weeks  1. Patient able to ambulate in community safely without assistive device, on varied terrainwith head movement, without LOB or c/o dizziness.  2. Pt to be I with self management of condition and progression vestibular program for maintenance.  3. Pt to improve Hughes by 6-10 points for improved safety with functional mobility    Plan   Plan of care Certification: 8/25/2023 to 10/30/2023.     Pt with improvements in pain and ROM of Left LE. Tolerated return to balance and strengthening program. Progress to tolerance.    Lukasz will be seen 2 times per weeks for the next 6 weeks for neuromuscular education, balance and coordinatoin training, therapeutic exercise, gait training, proprioception exercises, postural education, dynamic standing balance exercises, sensory organization activities, eye head coordination exercises , and and other therapeutic interventions as deemed necessary to address above goals.. Pt may be seen by a PTA  as part of the Rehab Team.      Clarissa Renae, PT, DPT, MHA, CLT, CEAS

## 2023-11-01 ENCOUNTER — PATIENT MESSAGE (OUTPATIENT)
Dept: INTERNAL MEDICINE | Facility: CLINIC | Age: 68
End: 2023-11-01
Payer: MEDICARE

## 2023-11-01 ENCOUNTER — CLINICAL SUPPORT (OUTPATIENT)
Dept: REHABILITATION | Facility: HOSPITAL | Age: 68
End: 2023-11-01
Payer: MEDICARE

## 2023-11-01 DIAGNOSIS — R53.81 PHYSICAL DECONDITIONING: Primary | ICD-10-CM

## 2023-11-01 DIAGNOSIS — F43.29 STRESS AND ADJUSTMENT REACTION: ICD-10-CM

## 2023-11-01 PROCEDURE — 97112 NEUROMUSCULAR REEDUCATION: CPT

## 2023-11-01 PROCEDURE — 97110 THERAPEUTIC EXERCISES: CPT

## 2023-11-01 PROCEDURE — 97535 SELF CARE MNGMENT TRAINING: CPT

## 2023-11-01 NOTE — PROGRESS NOTES
[facility]  Occupational Therapy Treatment Note - Therapeutic Yoga Progam    Date: 11/1/2023  Name: Lukasz Person  Clinic Number: [id number]    Therapy Diagnosis: No diagnosis found.  Physician: Betina Valdovinos, *    Physician Orders: Eval and Treat   Medical Diagnosis from Referral: Chronic low back pain [M54.50, G89.29], Poor posture [R29.3], Esophageal adenocarcinoma [C15.9]  Evaluation Date: 8/22/2023  Authorization Period Expiration: 12/31/23  Plan of Care Expiration: 01/22/23  Progress Note Due: 10/22/2  Visit # / Visits authorized: 1/20      Time Out: 10:15  Total Billable Time: 11:00 minutes    SUBJECTIVE     Pt reports: He fell and had to miss sessions.  He was compliant with home exercise program given last session.   Response to previous treatment:good  Functional change: eval only    Pain:  2/10 in left shoulder and 4/10 in left hip due to fall.      OBJECTIVE     Objective Measures updated at progress report unless specified.    Patient Specific Functional Scale:           Activity 8/22/23 11/1/23          Poor balance 5    5         2.  Carrying 20 pounds or more 7     6         3.  Challenge with stairs 5     6         4.  Endurance  4      6         5.               6.             SCORE 5.25    5.75            Total Score = Sum of activity scores / number of activities  Minimum Detectable Change (90% CII) for average score = 2 points  Minimum Detectable Change (90% CI) for single activity score = 3 points       Treatment     Lukasz received the treatments listed below:       Date 11/1/23        Therapeutic Yoga Exercises / Neuromuscular Re-education 30 minutes  minutes minutes  minutes  minutes  minutes   Seated Yoga   chair yoga:  Cat-Cow,forward bend, back bend, twist,         Quadruped         Supine Twist x 2, knees to chest,        Prone         standing Warrior 2, chair pose/vilcano x3,                           Self-Care/Home Management  / Therapeutic  Activities 15 minutes  minutes  minutes  minutes  minutes  minutes            Relaxation techniques DB, body scan,         Restorative  Supine with legs on chair        Activity Pacing                           Stress Management/Education  - physiology of yoga/meditation and immune health                     Patient Education and Home Exercises      Education provided:   - - physiology of yoga/meditation and immune health  - Progress towards goals     Written Home Exercises Provided: yes.  Exercises were reviewed and Lukasz was able to demonstrate them prior to the end of the session.  Lukasz demonstrated good  understanding of the HEP provided. See EMR under Patient Instructions for exercises provided during therapy sessions.       Assessment      In today's session, Lukasz was taught his HEP and this ws emailed to him.  He required maximum assist for verbal and neuromuscular cues.   Lukasz is progressing well towards his goals and patient prognosis is Good. Patient will continue to benefit from skilled outpatient occupational therapy to address the deficits listed in the problem list on initial evaluation provide pt/family education and to maximize pt's level of independence in the home and community environment. Pt's spiritual, cultural and educational needs considered and pt agreeable to plan of care and goals.    Anticipated barriers to occupational therapy: none    Goals:  Short Term Goals: 6 weeks      Goal # Goal Status   1 Patient will demonstrate independence with diaphragmatic breathing in sit and supine. Progressing   2 Pt. will identify resource for audio body scan to assist with stress management/immune health    3 Patient will identify 2 new stress coping skills for stress management/immune health. Progressing   4 Patient will identify activity pacing problems.  Patient will then implement new plan for daily activity to increase endurance for ADL's. Progressing      Long Term Goals: 12 weeks       Goal # Goal Status   1 Patient will demonstrate independence with yoga Home Exercise Program to increase strength and endurance for ADL's. Progressing   2 Patient will demonstrate independence with relaxation techniques to manage stress for immune health. Progressing   3 Patient will verbalize good understanding of stress/immune health relationship.  Progressing   4            PLAN     Plan of care Certification: 11/1/2023 to 12/31/23.    Outpatient Occupational Therapy 1 times weekly for 1 weeks to include the following interventions: Neuromuscular Re-ed, Patient Education, Self Care, Therapeutic Activities, and Therapeutic Exercise.     Joanna Kimball OT

## 2023-11-02 NOTE — PROGRESS NOTES
[facility]   Physical Therapy Treatment Note      Name: Lukasz Person  Clinic Number: [id number]    Therapy Diagnosis:   Encounter Diagnoses   Name Primary?    Imbalance Yes    Physical deconditioning              Physician: Kirk Wilson MD    Visit Date: 11/3/2023    Physician Orders: PT Eval and Treat   Medical Diagnosis from Referral: Imbalance  Evaluation Date: 8/25/2023  Authorization Period Expiration: 07/19/2024  Plan of Care Expiration: 10/30/2023; updated 12/8/2023  Progress Note Due: 09/25/2023  Visit # / Visits authorized: 6(+1 Eval)/ 20    PTA Visit #: 0/5      Precautions: Standard, Fall, and Immunosuppression      Time In: 1117   Time Out: 1159  Total Billable Time: 42 minutes    Subjective     Patient reports: pt notes that Left knee and shoulder are improved but left knee ROM remains limited with funcational activities  He was not compliant with home exercise program due to recovering from fall  Response to previous treatment:felt soreness in quad mm.  Functional change: noticed that going up and down steps with step over pattern without noticing    Objective      Objective Measures updated at progress report unless specified.     Treatment     Lukasz received the treatments listed below:      therapeutic exercises to develop strength, endurance, ROM, flexibility, posture, and core stabilization for  25 minutes including:  +Recumb bike x 10 minutes seat at 11 position for knee rocking; pt able to complete revolution with Left knee after 1 minute warm up  +Ball wall squats 30x     Not performed today  NuStep 10 minutes (instruction on set up) seat @9 and arms at 11  +Seated Knee flexion AAROM/PROM  Supine  +Abimael SLR with 2# ankle weight 30x  Sidelying  +Clam bilateral 30x 0#  +Supine Heel Slides AAROM and assit with strap x 10  mins  +Bridges 30x  +Bridges with half foam roll under ankles x 15 reps  +Seated LAQ AROM 15 reps abimael.  Right LE with 3# ankle weight  +Standing knee  bends with left foot on chair 15x 2 sets with 3 sec holds     neuromuscular re-education activities to improve: Balance, Coordination, Kinesthetic, Sense, Proprioception, and Posture for 17 minutes. The following activities were included:  Walking 40 ft laps on turf surface:  +Side stepping x 4 laps  +Tandem walking EO and EC x 4 laps each  +Walking with EO, EC x 4 laps each  +Walking Backwards x 4 laps  +Slow Marching x 4 laps with 2 second hold in SLS      Not performed today  +EC, FT firm surface head turns vert and horiz x 10 reps each  +EC, FT compliant surface head turns vert and horiz x 10 reps each  SLS on foam RLE with HT and EC  +Semi squat side stepping x 40 feet x 4 reps  +Tandem walking with EO and EC    therapeutic activities to improve functional performance for 00  minutes, including:     gait training to improve functional mobility and safety for 00  minutes, including:      Patient Education and Home Exercises       Education provided:   - balance  -falls and safety    Written Home Exercises Provided: yes. Exercises were reviewed and Lukasz was able to demonstrate them prior to the end of the session.  Lukasz demonstrated good  understanding of the education provided. See Electronic Medical Record under Patient Instructions for exercises provided during therapy sessions    Assessment   Pt with improved knee ROM and activity tolerance. Continues to improve functional balance    This is a 68 y.o. male with a medical diagnosis of        Encounter Diagnoses   Name Primary?    Physical deconditioning Yes    Imbalance     Patient presents with impaired balance. Lukasz will benefit from skilled physical therapy intervention to address the above impairments and functional limitations. Pt would benefit from general strengthening and conditioning while including weightbearing balance activitites to improve safe functional mobility to meet the patient's goals for therapy.    Lukasz Is progressing well  towards his goals.   Patient prognosis is Excellent.     Patient will continue to benefit from skilled outpatient physical therapy to address the deficits listed in the problem list box on initial evaluation, provide pt/family education and to maximize pt's level of independence in the home and community environment.     Patient's spiritual, cultural and educational needs considered and pt agreeable to plan of care and goals.     Anticipated barriers to physical therapy: saima    The following goals were discussed with the patient and patient is in agreement with them as to be addressed in the treatment plan.     Goals:  STG'S: 3 weeks  1. Patient independent in HEP of balance and head-eye coordination.  Exercises to be done Daily.  2. Patient able to perform forward bending without LOB or c/o dizziness  3. Pt to improve Hughes by 4-6 points for improved safety with functional mobility     LTG'S  : 6 weeks  1. Patient able to ambulate in community safely without assistive device, on varied terrainwith head movement, without LOB or c/o dizziness.  2. Pt to be I with self management of condition and progression vestibular program for maintenance.  3. Pt to improve Hughes by 6-10 points for improved safety with functional mobility    Plan   Plan of care Certification: 8/25/2023 to 12/8/2023     Pt continues to show improvements in pain and ROM of Left LE. Tolerated return to balance and strengthening program. Progress to tolerance.    Lukasz will be seen 2 times per weeks for the next 6 weeks for neuromuscular education, balance and coordinatoin training, therapeutic exercise, gait training, proprioception exercises, postural education, dynamic standing balance exercises, sensory organization activities, eye head coordination exercises , and and other therapeutic interventions as deemed necessary to address above goals.. Pt may be seen by a PTA as part of the Rehab Team.      Clarissa Renae, PT, DPT, MHA, CLT, CEAS

## 2023-11-03 ENCOUNTER — CLINICAL SUPPORT (OUTPATIENT)
Dept: REHABILITATION | Facility: OTHER | Age: 68
End: 2023-11-03
Payer: MEDICARE

## 2023-11-03 DIAGNOSIS — R26.89 IMBALANCE: Primary | ICD-10-CM

## 2023-11-03 DIAGNOSIS — R53.81 PHYSICAL DECONDITIONING: ICD-10-CM

## 2023-11-03 PROCEDURE — 97110 THERAPEUTIC EXERCISES: CPT | Mod: PN | Performed by: PHYSICAL THERAPIST

## 2023-11-03 PROCEDURE — 97112 NEUROMUSCULAR REEDUCATION: CPT | Mod: PN | Performed by: PHYSICAL THERAPIST

## 2023-11-04 RX ORDER — OMEPRAZOLE 40 MG/1
40 CAPSULE, DELAYED RELEASE ORAL
Qty: 90 CAPSULE | Refills: 3 | Status: SHIPPED | OUTPATIENT
Start: 2023-11-04

## 2023-11-04 NOTE — TELEPHONE ENCOUNTER
No care due was identified.  [facility] Embedded Care Due Messages. Reference number: [id number].   11/04/2023 3:43:58 AM CDT

## 2023-11-04 NOTE — TELEPHONE ENCOUNTER
Refill Decision Note   Lukasz Person  is requesting a refill authorization.  Brief Assessment and Rationale for Refill:  Approve     Medication Therapy Plan:         Comments:     Note composed:12:37 PM 11/04/2023

## 2023-11-06 ENCOUNTER — PATIENT OUTREACH (OUTPATIENT)
Dept: ADMINISTRATIVE | Facility: HOSPITAL | Age: 68
End: 2023-11-06
Payer: MEDICARE

## 2023-11-07 ENCOUNTER — OFFICE VISIT (OUTPATIENT)
Dept: INTERNAL MEDICINE | Facility: CLINIC | Age: 68
End: 2023-11-07
Payer: MEDICARE

## 2023-11-07 ENCOUNTER — TELEPHONE (OUTPATIENT)
Dept: INTERNAL MEDICINE | Facility: CLINIC | Age: 68
End: 2023-11-07

## 2023-11-07 ENCOUNTER — CLINICAL SUPPORT (OUTPATIENT)
Dept: REHABILITATION | Facility: OTHER | Age: 68
End: 2023-11-07
Payer: MEDICARE

## 2023-11-07 VITALS
HEART RATE: 71 BPM | SYSTOLIC BLOOD PRESSURE: 116 MMHG | DIASTOLIC BLOOD PRESSURE: 76 MMHG | BODY MASS INDEX: 25.32 KG/M2 | WEIGHT: 186.94 LBS | TEMPERATURE: 98 F | RESPIRATION RATE: 18 BRPM | OXYGEN SATURATION: 97 % | HEIGHT: 72 IN

## 2023-11-07 DIAGNOSIS — S46.012A TRAUMATIC TEAR OF LEFT ROTATOR CUFF, UNSPECIFIED TEAR EXTENT, INITIAL ENCOUNTER: Primary | ICD-10-CM

## 2023-11-07 DIAGNOSIS — R42 VERTIGO: Primary | ICD-10-CM

## 2023-11-07 DIAGNOSIS — R53.81 PHYSICAL DECONDITIONING: ICD-10-CM

## 2023-11-07 DIAGNOSIS — R49.0 HOARSENESS: ICD-10-CM

## 2023-11-07 DIAGNOSIS — R26.89 IMBALANCE: ICD-10-CM

## 2023-11-07 PROCEDURE — 97112 NEUROMUSCULAR REEDUCATION: CPT | Mod: PN | Performed by: PHYSICAL THERAPIST

## 2023-11-07 PROCEDURE — 99215 OFFICE O/P EST HI 40 MIN: CPT | Mod: PBBFAC,PO | Performed by: INTERNAL MEDICINE

## 2023-11-07 PROCEDURE — 99213 PR OFFICE/OUTPT VISIT, EST, LEVL III, 20-29 MIN: ICD-10-PCS | Mod: S$PBB,,, | Performed by: INTERNAL MEDICINE

## 2023-11-07 PROCEDURE — 97110 THERAPEUTIC EXERCISES: CPT | Mod: PN | Performed by: PHYSICAL THERAPIST

## 2023-11-07 PROCEDURE — 99213 OFFICE O/P EST LOW 20 MIN: CPT | Mod: S$PBB,,, | Performed by: INTERNAL MEDICINE

## 2023-11-07 PROCEDURE — 99999PBSHW PNEUMOCOCCAL CONJUGATE VACCINE 20-VALENT: ICD-10-PCS | Mod: PBBFAC,,,

## 2023-11-07 PROCEDURE — 99999 PR PBB SHADOW E&M-EST. PATIENT-LVL V: CPT | Mod: PBBFAC,,, | Performed by: INTERNAL MEDICINE

## 2023-11-07 PROCEDURE — 90677 PCV20 VACCINE IM: CPT | Mod: PBBFAC,PO

## 2023-11-07 PROCEDURE — 99999PBSHW PNEUMOCOCCAL CONJUGATE VACCINE 20-VALENT: Mod: PBBFAC,,,

## 2023-11-07 PROCEDURE — 99999 PR PBB SHADOW E&M-EST. PATIENT-LVL V: ICD-10-PCS | Mod: PBBFAC,,, | Performed by: INTERNAL MEDICINE

## 2023-11-07 NOTE — TELEPHONE ENCOUNTER
Pt forgot to ask:  You referred him to ortho for shoulder a while back. Do you still want him to persue this? and if so, who do you recommend?  Are we doing Physical therapy prior to referral or in place?

## 2023-11-07 NOTE — TELEPHONE ENCOUNTER
Please have patient do physical therapy 1st, if this fails to provide improvement, then he can see Orthopedics.

## 2023-11-07 NOTE — PROGRESS NOTES
Subjective:       Patient ID: Lukasz Person is a 68 y.o. male.    Chief Complaint: left shoulder pain    HPI    68-year-old male here for evaluation of left shoulder pain.  He reports that his shoulder started to bother him when he fell October 19th.  It was not a problem for the next 2-3 weeks.  It does not bother him all the time.  He has pain in the back of his shoulder when he rolls his shoulder.  Reaching over when he baths hurts.  It starts to hurt when he reaches across his body.  He fell with his arm outstretched.  He does not remember much aside from hitting his foot and rolling in the gutter. He has a rotator cuff tear.      His leg is on its way to recovering.  He is still doing a little stretch for the leg today.  He has been doing core strength and balance.    Patient has hoarseness and has seen ENT for this.  They did not see a reason for this when they it a laryngoscope.  They recommended speech therapy.    Review of Systems      Objective:      Physical Exam  Vitals reviewed.   Constitutional:       Appearance: He is well-developed.   HENT:      Head: Normocephalic and atraumatic.      Mouth/Throat:      Pharynx: No oropharyngeal exudate.   Eyes:      General: No scleral icterus.        Right eye: No discharge.         Left eye: No discharge.      Pupils: Pupils are equal, round, and reactive to light.   Neck:      Thyroid: No thyromegaly.      Trachea: No tracheal deviation.   Cardiovascular:      Rate and Rhythm: Normal rate and regular rhythm.      Heart sounds: Normal heart sounds. No murmur heard.     No friction rub. No gallop.   Pulmonary:      Effort: Pulmonary effort is normal. No respiratory distress.      Breath sounds: Normal breath sounds. No wheezing or rales.   Chest:      Chest wall: No tenderness.   Abdominal:      General: Bowel sounds are normal. There is no distension.      Palpations: Abdomen is soft. There is no mass.      Tenderness: There is no abdominal tenderness. There is no  guarding or rebound.   Musculoskeletal:         General: No tenderness. Normal range of motion.      Cervical back: Normal range of motion and neck supple.   Skin:     General: Skin is warm and dry.      Coloration: Skin is not pale.      Findings: No erythema or rash.   Neurological:      Mental Status: He is alert and oriented to person, place, and time.   Psychiatric:         Behavior: Behavior normal.         Assessment:       1. Traumatic tear of left rotator cuff, unspecified tear extent, initial encounter  - Ambulatory referral/consult to Physical/Occupational Therapy; Future    2. Hoarseness  - Ambulatory referral/consult to Speech Therapy; Future      Plan:       1. Refer to physical therapy.    2.  Refer to speech therapy.

## 2023-11-07 NOTE — PROGRESS NOTES
[facility]   Physical Therapy Treatment Note      Name: Lukasz Person  Clinic Number: [id number]    Therapy Diagnosis:   Encounter Diagnoses   Name Primary?    Vertigo Yes    Imbalance     Physical deconditioning        Physician: Kirk Wilson MD    Visit Date: 2023    Physician Orders: PT Eval and Treat   Medical Diagnosis from Referral: Imbalance  Evaluation Date: 2023  Authorization Period Expiration: 2024  Plan of Care Expiration: 10/30/2023; updated 2023  Progress Note Due: 2023  Visit # / Visits authorized: 7 (+1 Eval)/ 20    PTA Visit #: 0/5      Precautions: Standard, Fall, and Immunosuppression      Time In: 1006   Time Out: 1054  Total Billable Time: 48 minutes    Subjective     Patient reports: much better.    He was compliant with home exercise program   Response to previous treatment:felt soreness in quad mm.  Functional change: denies any falls or near fall  Objective      Objective Measures updated at progress report unless specified.     ACUÑA/56 Limitations remain with looking behind while walking; Weight shifting is variable and can cause deviation from walking path    Treatment     Lukasz received the treatments listed below:      therapeutic exercises to develop strength, endurance, ROM, flexibility, posture, and core stabilization for  15  minutes including:  +Recumb bike x 10 minutes seat at 11 position for knee rocking; pt able to complete revolution with Left knee after 0 minute warm up; Much improved knee ROM   +Ball wall mini squats 30x   + up/down practice stairs in step through pattern x 4 reps    Not performed today  NuStep 10 minutes (instruction on set up) seat @9 and arms at 11  +Seated Knee flexion AAROM/PROM  Supine  +Abimael SLR with 2# ankle weight 30x  Sidelying  +Clam bilateral 30x 0#  +Supine Heel Slides AAROM and assit with strap x 10  mins  +Bridges 30x  +Bridges with half foam roll under ankles x 15 reps  +Seated LAQ AROM 15  reps ronal.  Right LE with 3# ankle weight  +Standing knee bends with left foot on chair 15x 2 sets with 3 sec holds     neuromuscular re-education activities to improve: Balance, Coordination, Kinesthetic, Sense, Proprioception, and Posture for 33 minutes. The following activities were included:  Walking 40 ft laps on turf surface:  +Semi squat side stepping  x 4 reps  +Tandem walking EO and EC x 4 laps each  +Walking with EO, EC x 4 laps each  +Walking Backwards x 4 laps; 1 lap with eye closed  +Slow Marching x 4 laps with 2 second hold in SLS  +Toe Walking x 4 laps  +SLS on foam x 30 seconds 4-5 reps ronal LE  +Tandem Stand on Foam EC x 4-5 attempts to reach 30 secs    Not performed today  +EC, FT firm surface head turns vert and horiz x 10 reps each  +EC, FT compliant surface head turns vert and horiz x 10 reps each    therapeutic activities to improve functional performance for 00  minutes, including:     gait training to improve functional mobility and safety for 00  minutes, including:      Patient Education and Home Exercises       Education provided:   - balance  -falls and safety    Written Home Exercises Provided: yes. Exercises were reviewed and Lukasz was able to demonstrate them prior to the end of the session.  Lukasz demonstrated good  understanding of the education provided. See Electronic Medical Record under Patient Instructions for exercises provided during therapy sessions    Assessment   Pt with improved knee ROM and activity tolerance. Continues to improve functional balance    This is a 68 y.o. male with a medical diagnosis of        Encounter Diagnoses   Name Primary?    Physical deconditioning Yes    Imbalance     Patient presents with impaired balance. Lukasz will benefit from skilled physical therapy intervention to address the above impairments and functional limitations. Pt would benefit from general strengthening and conditioning while including weightbearing balance activitites to  improve safe functional mobility to meet the patient's goals for therapy.    Lukasz Is progressing well towards his goals.   Patient prognosis is Excellent.     Patient will continue to benefit from skilled outpatient physical therapy to address the deficits listed in the problem list box on initial evaluation, provide pt/family education and to maximize pt's level of independence in the home and community environment.     Patient's spiritual, cultural and educational needs considered and pt agreeable to plan of care and goals.     Anticipated barriers to physical therapy: saima    The following goals were discussed with the patient and patient is in agreement with them as to be addressed in the treatment plan.     Goals:  STG'S: 3 weeks  1. Patient independent in HEP of balance and head-eye coordination.  Exercises to be done Daily.MET  2. Patient able to perform forward bending without LOB or c/o dizziness MET  3. Pt to improve [place] by 4-6 points for improved safety with functional mobility MET     LTG'S  : 6 weeks  1. Patient able to ambulate in community safely without assistive device, on varied terrainwith head movement, without LOB or c/o dizziness. MET  2. Pt to be I with self management of condition and progression vestibular program for maintenance. MET  3. Pt to improve [place] by 6-10 points for improved safety with functional mobility    Plan   Plan of care Certification: 8/25/2023 to 12/8/2023     Pt with improved ACUÑA balance score.  Progressing very well with balance goals.    Lukasz will be seen 2 times per weeks for the next 6 weeks for neuromuscular education, balance and coordinatoin training, therapeutic exercise, gait training, proprioception exercises, postural education, dynamic standing balance exercises, sensory organization activities, eye head coordination exercises , and and other therapeutic interventions as deemed necessary to address above goals.. Pt may be seen by a PTA as part of the  Rehab Team.      Clarissa Renae, PT, DPT, MHA, CLT, CEAS

## 2023-11-10 ENCOUNTER — CLINICAL SUPPORT (OUTPATIENT)
Dept: REHABILITATION | Facility: HOSPITAL | Age: 68
End: 2023-11-10
Payer: MEDICARE

## 2023-11-10 DIAGNOSIS — R53.81 PHYSICAL DECONDITIONING: Primary | ICD-10-CM

## 2023-11-10 DIAGNOSIS — F43.29 STRESS AND ADJUSTMENT REACTION: ICD-10-CM

## 2023-11-10 PROCEDURE — 97110 THERAPEUTIC EXERCISES: CPT

## 2023-11-10 PROCEDURE — 97112 NEUROMUSCULAR REEDUCATION: CPT

## 2023-11-10 PROCEDURE — 97535 SELF CARE MNGMENT TRAINING: CPT

## 2023-11-10 NOTE — PROGRESS NOTES
"  [facility]  Occupational Therapy Treatment Note - Therapeutic Yoga Progam    Date: 11/10/2023  Name: Lukasz Person  Clinic Number: [id number]    Therapy Diagnosis:   Encounter Diagnoses   Name Primary?    Physical deconditioning Yes    Stress and adjustment reaction      Physician: Betina Valdovinos, *    Physician Orders: Eval and Treat   Medical Diagnosis from Referral: Chronic low back pain [M54.50, G89.29], Poor posture [R29.3], Esophageal adenocarcinoma [C15.9]  Evaluation Date: 8/22/2023  Authorization Period Expiration: 12/31/23  Plan of Care Expiration: 01/22/23  Progress Note Due: 10/22/2  Visit # / Visits authorized: 2/20     Time in:     8:45 am  Time Out:   9:30 am  Total Billable Time: 45 minutes    SUBJECTIVE     Pt reports: "My left leg is feeling better.  He was compliant with home exercise program given last session.   Response to previous treatment: good  Functional change:  pracrticing deeper breathing for relaxation/immune health    Pain:  2/10 in left shoulder due to fall      OBJECTIVE     Objective Measures updated at progress report unless specified.    Patient Specific Functional Scale:           Activity 8/22/23 11/1/23          Poor balance 5    5         2.  Carrying 20 pounds or more 7     6         3.  Challenge with stairs 5     6         4.  Endurance  4      6         5.               6.             SCORE 5.25    5.75            Total Score = Sum of activity scores / number of activities  Minimum Detectable Change (90% CII) for average score = 2 points  Minimum Detectable Change (90% CI) for single activity score = 3 points       Treatment     Lukasz received the treatments listed below:       Date 11/1/23 11/10/23       Therapeutic Yoga Exercises / Neuromuscular Re-education 30 minutes  30 minutes minutes  minutes  minutes  minutes   Seated Yoga   chair yoga:  Cat-Cow,forward bend, back bend, twist,  chair yoga:  Cat-Cow,forward bend, back bend, " twist, figure 4       Quadruped         Supine Twist x 2, knees to chest, Chair pose with block 2 x 5, Twist x 2,( eagle and wide feet),  knees to chest, happy baby flow, knee to chest flow, figure 4, bound angle       Prone         standing Warrior 2, chair pose/vilcano x3,                           Self-Care/Home Management  / Therapeutic Activities 15 minutes  15 minutes  minutes  minutes  minutes  minutes            Relaxation techniques DB, body scan,         Restorative  Supine with legs on chair Supine with legs on chair and bolster under hips- this caused reflux reaction       Activity Pacing                           Stress Management/Education  - physiology of yoga/meditation and immune health - physiology of yoga/meditation and immune health                    Patient Education and Home Exercises      Education provided:   - - physiology of yoga/meditation and immune health  - Progress towards goals     Written Home Exercises Provided: yes.  Exercises were reviewed and Lukasz was able to demonstrate them prior to the end of the session.  Lukasz demonstrated good  understanding of the HEP provided. See EMR under Patient Instructions for exercises provided during therapy sessions.       Assessment      In today's session, Lukasz reviewed his HEP and new poses were added. This updated version was emailed and added to note.  He required maximum assist for verbal and neuromuscular cues. He did not tolerate supported inversion with bolster under hips and calves on chair due to reflux.    Lukasz is progressing well towards his goals and patient prognosis is Good. Patient will continue to benefit from skilled outpatient occupational therapy to address the deficits listed in the problem list on initial evaluation provide pt/family education and to maximize pt's level of independence in the home and community environment. Pt's spiritual, cultural and educational needs considered and pt agreeable to plan of care  and goals.    Anticipated barriers to occupational therapy: none    Goals:  Short Term Goals: 6 weeks      Goal # Goal Status   1 Patient will demonstrate independence with diaphragmatic breathing in sit and supine. Progressing   2 Pt. will identify resource for audio body scan to assist with stress management/immune health    3 Patient will identify 2 new stress coping skills for stress management/immune health. Progressing   4 Patient will identify activity pacing problems.  Patient will then implement new plan for daily activity to increase endurance for ADL's. Progressing      Long Term Goals: 12 weeks      Goal # Goal Status   1 Patient will demonstrate independence with yoga Home Exercise Program to increase strength and endurance for ADL's. Progressing   2 Patient will demonstrate independence with relaxation techniques to manage stress for immune health. Progressing   3 Patient will verbalize good understanding of stress/immune health relationship.  Progressing   4            PLAN     Plan of care Certification: 11/10/2023 to 12/31/23.    Outpatient Occupational Therapy 1 times weekly for 1 weeks to include the following interventions: Neuromuscular Re-ed, Patient Education, Self Care, Therapeutic Activities, and Therapeutic Exercise.     Joanna Kimball OT

## 2023-11-14 ENCOUNTER — OFFICE VISIT (OUTPATIENT)
Dept: HEMATOLOGY/ONCOLOGY | Facility: CLINIC | Age: 68
End: 2023-11-14
Payer: MEDICARE

## 2023-11-14 ENCOUNTER — INFUSION (OUTPATIENT)
Dept: INFUSION THERAPY | Facility: HOSPITAL | Age: 68
End: 2023-11-14
Payer: MEDICARE

## 2023-11-14 VITALS
RESPIRATION RATE: 18 BRPM | HEART RATE: 57 BPM | BODY MASS INDEX: 28.35 KG/M2 | TEMPERATURE: 99 F | DIASTOLIC BLOOD PRESSURE: 60 MMHG | OXYGEN SATURATION: 98 % | WEIGHT: 191.38 LBS | SYSTOLIC BLOOD PRESSURE: 139 MMHG | HEIGHT: 69 IN

## 2023-11-14 DIAGNOSIS — I25.10 CORONARY ARTERY DISEASE INVOLVING NATIVE CORONARY ARTERY OF NATIVE HEART WITHOUT ANGINA PECTORIS: ICD-10-CM

## 2023-11-14 DIAGNOSIS — E66.9 OBESITY, DIABETES, AND HYPERTENSION SYNDROME: ICD-10-CM

## 2023-11-14 DIAGNOSIS — E11.59 OBESITY, DIABETES, AND HYPERTENSION SYNDROME: ICD-10-CM

## 2023-11-14 DIAGNOSIS — R21 RASH: ICD-10-CM

## 2023-11-14 DIAGNOSIS — E11.59 HYPERTENSION ASSOCIATED WITH DIABETES: ICD-10-CM

## 2023-11-14 DIAGNOSIS — I15.2 OBESITY, DIABETES, AND HYPERTENSION SYNDROME: ICD-10-CM

## 2023-11-14 DIAGNOSIS — E11.29 TYPE 2 DIABETES MELLITUS WITH OTHER DIABETIC KIDNEY COMPLICATION, WITHOUT LONG-TERM CURRENT USE OF INSULIN: ICD-10-CM

## 2023-11-14 DIAGNOSIS — E11.69 HYPERLIPIDEMIA ASSOCIATED WITH TYPE 2 DIABETES MELLITUS: ICD-10-CM

## 2023-11-14 DIAGNOSIS — C15.9 ESOPHAGEAL ADENOCARCINOMA: Primary | ICD-10-CM

## 2023-11-14 DIAGNOSIS — N18.30 TYPE 2 DIABETES MELLITUS WITH STAGE 3 CHRONIC KIDNEY DISEASE, WITHOUT LONG-TERM CURRENT USE OF INSULIN, UNSPECIFIED WHETHER STAGE 3A OR 3B CKD: ICD-10-CM

## 2023-11-14 DIAGNOSIS — J38.00 VOCAL CORD PARALYSIS: ICD-10-CM

## 2023-11-14 DIAGNOSIS — I48.0 PAROXYSMAL ATRIAL FIBRILLATION: ICD-10-CM

## 2023-11-14 DIAGNOSIS — I50.20 HFREF (HEART FAILURE WITH REDUCED EJECTION FRACTION): ICD-10-CM

## 2023-11-14 DIAGNOSIS — K04.7 DENTAL INFECTION: ICD-10-CM

## 2023-11-14 DIAGNOSIS — Z00.6 CLINICAL TRIAL PARTICIPANT: ICD-10-CM

## 2023-11-14 DIAGNOSIS — E11.22 TYPE 2 DIABETES MELLITUS WITH STAGE 3 CHRONIC KIDNEY DISEASE, WITHOUT LONG-TERM CURRENT USE OF INSULIN, UNSPECIFIED WHETHER STAGE 3A OR 3B CKD: ICD-10-CM

## 2023-11-14 DIAGNOSIS — E44.0 MODERATE MALNUTRITION: ICD-10-CM

## 2023-11-14 DIAGNOSIS — E11.69 OBESITY, DIABETES, AND HYPERTENSION SYNDROME: ICD-10-CM

## 2023-11-14 DIAGNOSIS — E78.5 HYPERLIPIDEMIA ASSOCIATED WITH TYPE 2 DIABETES MELLITUS: ICD-10-CM

## 2023-11-14 DIAGNOSIS — I15.2 HYPERTENSION ASSOCIATED WITH DIABETES: ICD-10-CM

## 2023-11-14 PROCEDURE — A4216 STERILE WATER/SALINE, 10 ML: HCPCS | Mod: Q1 | Performed by: REGISTERED NURSE

## 2023-11-14 PROCEDURE — 63600175 PHARM REV CODE 636 W HCPCS: Performed by: REGISTERED NURSE

## 2023-11-14 PROCEDURE — 25000003 PHARM REV CODE 250: Mod: Q1 | Performed by: REGISTERED NURSE

## 2023-11-14 PROCEDURE — 99214 OFFICE O/P EST MOD 30 MIN: CPT | Mod: PBBFAC,25 | Performed by: REGISTERED NURSE

## 2023-11-14 PROCEDURE — 99215 OFFICE O/P EST HI 40 MIN: CPT | Mod: Q1,S$PBB,, | Performed by: REGISTERED NURSE

## 2023-11-14 PROCEDURE — 99999 PR PBB SHADOW E&M-EST. PATIENT-LVL IV: CPT | Mod: PBBFAC,,, | Performed by: REGISTERED NURSE

## 2023-11-14 PROCEDURE — 99999 PR PBB SHADOW E&M-EST. PATIENT-LVL IV: ICD-10-PCS | Mod: PBBFAC,,, | Performed by: REGISTERED NURSE

## 2023-11-14 PROCEDURE — 96413 CHEMO IV INFUSION 1 HR: CPT | Mod: Q1

## 2023-11-14 PROCEDURE — 99215 PR OFFICE/OUTPT VISIT, EST, LEVL V, 40-54 MIN: ICD-10-PCS | Mod: Q1,S$PBB,, | Performed by: REGISTERED NURSE

## 2023-11-14 RX ORDER — SODIUM CHLORIDE 0.9 % (FLUSH) 0.9 %
10 SYRINGE (ML) INJECTION
Status: CANCELLED | OUTPATIENT
Start: 2023-11-14

## 2023-11-14 RX ORDER — DIPHENHYDRAMINE HYDROCHLORIDE 50 MG/ML
50 INJECTION INTRAMUSCULAR; INTRAVENOUS ONCE AS NEEDED
Status: DISCONTINUED | OUTPATIENT
Start: 2023-11-14 | End: 2023-11-14 | Stop reason: HOSPADM

## 2023-11-14 RX ORDER — DIPHENHYDRAMINE HYDROCHLORIDE 50 MG/ML
50 INJECTION INTRAMUSCULAR; INTRAVENOUS ONCE AS NEEDED
Status: CANCELLED | OUTPATIENT
Start: 2023-11-14

## 2023-11-14 RX ORDER — EPINEPHRINE 0.3 MG/.3ML
0.3 INJECTION SUBCUTANEOUS ONCE AS NEEDED
Status: CANCELLED | OUTPATIENT
Start: 2023-11-14

## 2023-11-14 RX ORDER — SODIUM CHLORIDE 0.9 % (FLUSH) 0.9 %
10 SYRINGE (ML) INJECTION
Status: DISCONTINUED | OUTPATIENT
Start: 2023-11-14 | End: 2023-11-14 | Stop reason: HOSPADM

## 2023-11-14 RX ORDER — HEPARIN 100 UNIT/ML
500 SYRINGE INTRAVENOUS
Status: DISCONTINUED | OUTPATIENT
Start: 2023-11-14 | End: 2023-11-14 | Stop reason: HOSPADM

## 2023-11-14 RX ORDER — HEPARIN 100 UNIT/ML
500 SYRINGE INTRAVENOUS
Status: CANCELLED | OUTPATIENT
Start: 2023-11-14

## 2023-11-14 RX ORDER — EPINEPHRINE 0.3 MG/.3ML
0.3 INJECTION SUBCUTANEOUS ONCE AS NEEDED
Status: DISCONTINUED | OUTPATIENT
Start: 2023-11-14 | End: 2023-11-14 | Stop reason: HOSPADM

## 2023-11-14 RX ADMIN — HEPARIN 500 UNITS: 100 SYRINGE at 04:11

## 2023-11-14 RX ADMIN — SODIUM CHLORIDE: 9 INJECTION, SOLUTION INTRAVENOUS at 03:11

## 2023-11-14 RX ADMIN — Medication 10 ML: at 04:11

## 2023-11-14 NOTE — PROGRESS NOTES
MEDICAL ONCOLOGY - ESTABLISHED PATIENT VISIT    Reason for visit: esophageal cancer    Best Contact Phone Number(s): There are no phone numbers on file.     Cancer/Stage/TNM:    Cancer Staging   Esophageal adenocarcinoma  Staging form: Esophagus - Adenocarcinoma, AJCC 8th Edition  - Pathologic stage from 3/28/2023: Stage II (ypT3, pN0, cM0, G2) - Signed by Santana Brown Jr., MD on 3/28/2023       Oncology History   Esophageal adenocarcinoma   12/8/2022 Initial Diagnosis    Esophageal adenocarcinoma     12/21/2022 - 12/21/2022 Chemotherapy    Treatment Summary   Plan Name: OP ESOPHAGEAL PACLITAXEL CARBOPLATIN WEEKLY  Treatment Goal: Curative  Status: Inactive  Start Date:   End Date:   Provider: Miki Medrano MD  Chemotherapy: CARBOplatin (PARAPLATIN) in sodium chloride 0.9% 250 mL chemo infusion, , Intravenous, Clinic/HOD 1 time, 0 of 1 cycle  PACLitaxeL (TAXOL) 50 mg/m2 = 120 mg in sodium chloride 0.9% 250 mL chemo infusion, 50 mg/m2, Intravenous, Clinic/HOD 1 time, 0 of 1 cycle     12/29/2022 - 1/20/2023 Chemotherapy    Treatment Summary   Plan Name: [facility] [id number] ARM B CARBOPLATIN PACLITAXEL NIVOLUMAB  Treatment Goal: Curative  Status: Inactive  Start Date: 12/29/2022  End Date: 1/20/2023  Provider: Miki Medrano MD  Chemotherapy: CARBOplatin (PARAPLATIN) 215 mg in sodium chloride 0.9% 306.5 mL chemo infusion, 215 mg, Intravenous, Clinic/HOD 1 time, 4 of 5 cycles  Administration: 215 mg (12/29/2022), 230 mg (1/5/2023), 265 mg (1/13/2023), 265 mg (1/20/2023)  PACLitaxeL (TAXOL) 50 mg/m2 = 120 mg in sodium chloride 0.9% 250 mL chemo infusion, 50 mg/m2 = 120 mg, Intravenous, Clinic/HOD 1 time, 4 of 5 cycles  Dose modification: 50 mg/m2 (original dose 50 mg/m2, Cycle 4)  Administration: 120 mg (12/29/2022), 120 mg (1/5/2023), 120 mg (1/13/2023), 120 mg (1/20/2023)     12/29/2022 - 2/1/2023 Radiation Therapy    Treating physician: Dr. Javier Moulton    Course: C1 CHEST 2022    Treatment Site Ref.  ID Energy Dose/Fx (Gy) #Fx Dose Correction (Gy) Total Dose (Gy) Start Date End Date Elapsed Days   IM Esophagus [id number] 6X 1.8 23 / 23 0 41.4 12/29/2022 2/1/2023 34        3/17/2023 Surgery    Procedure: Procedure(s) (LRB):  XI ROBOTIC ESOPHAGECTOMY (N/A)  ROBOTIC JEJUNOSTOMY TUBE INSERTION (N/A)      Surgeon(s) and Role:     * Santana Brown Jr., MD - Primary     * Darian Murphy MD - Assisting     Assistance: Due to having no qualified resident during critical portions of the case, Dr. Darian Murphy acted as an assistant.     Pre-Operative Diagnosis: Esophageal adenocarcinoma, distal 1/3     Post-Operative Diagnosis: Same     Pre-Operative Variables:  Stage: T3 N0  Adjacent organ involvement: None  Chemotherapy within 90 Days: Yes  Radiation Therapy within 90 Days: Yes     Comorbidities:  Morbid obesity  Type 2 diabetes mellitus  Obstructive sleep apnea  Gout  Hyperparathyroidism  Hypertension  Severe obesity  Coronary artery disease  Heart failure with reduced ejection fraction    Procedure:  Robotic-assisted Vj three field esophagectomy  Regional mediastinal lymph node dissection  Botox injection of the pylorus  Jejunostomy tube placement     Operative Findings:                No evidence of distant intraperitoneal metastases in the chest or abdomen  Esophageal tumor located in the distal third of the esophagus, mild radiation changes appreciated.  Resection status:  R0 pending final pathology.  No gross disease remaining post dissection.  Frozen sections on proximal and distal margins negative for malignancy  100u Botox injection into the pylorus  Stapled esophagogastrostomy performed at the neck incision after confirmation of negative margins.  Jejunostomy tube placed      3/28/2023 Cancer Staged    Staging form: Esophagus - Adenocarcinoma, AJCC 8th Edition  - Pathologic stage from 3/28/2023: Stage II (ypT3, pN0, cM0, G2)     5/1/2023 - 5/1/2023 Chemotherapy    Treatment Summary   Plan Name: [facility] [id number]  ARM C ADJUVANT NIVOLUMAB  Treatment Goal: Curative  Status: Inactive  Start Date: 5/1/2023  End Date: 5/1/2023  Provider: Miki Medrano MD  Chemotherapy: [No matching medication found in this treatment plan]     5/29/2023 -  Chemotherapy    Treatment Summary   Plan Name: [facility] [id number] ARM C ADJUVANT NIVOLUMAB STEP 2  Treatment Goal: Curative  Status: Active  Start Date: 5/29/2023  End Date: 4/29/2024 (Planned)  Provider: Miki Medrano MD  Chemotherapy: [No matching medication found in this treatment plan]          Interim History:   Mr. Person returns to clinic today prior to cycle 8 of adjuvant nivolumab. He underwent robotic esophagectomy on 3/14/23 with Dr. Brown and J tube insertion.     Doing well overall. Energy stable. Appetite is good. Staying hydrated. No nausea or vomiting. Pruritus controlled with triamcinolone. Occasional bowel changes based on what he eats. No worsening dysphagia. Sees cardiology this month for regular checkup.     Presents to clinic alone. ECOG 0.     Review of Systems   Constitutional:  Negative for chills, diaphoresis, fever, malaise/fatigue and weight loss.   HENT:  Negative for sore throat.    Eyes:  Negative for blurred vision and double vision.   Respiratory:  Negative for cough and shortness of breath.    Cardiovascular:  Negative for chest pain, palpitations and leg swelling.   Gastrointestinal:  Negative for abdominal pain, blood in stool, constipation, diarrhea, heartburn, nausea and vomiting.        Dysphagia   Genitourinary:  Negative for dysuria, frequency, hematuria and urgency.   Musculoskeletal:  Negative for back pain, falls, myalgias and neck pain.   Skin:  Positive for itching and rash.   Neurological:  Negative for dizziness, tingling, weakness and headaches.   Psychiatric/Behavioral:  The patient is not nervous/anxious.      Past Medical History:   Past Medical History:   Diagnosis Date    Anticoagulant long-term use     Cancer     Diabetes mellitus      Onset late 50s/early 60s    Hyperlipidemia     Hypertension     Onset late 50s/early 60s    Kidney stones     Sleep apnea     since 2006      Past Surgical History:   Past Surgical History:   Procedure Laterality Date    COLONOSCOPY N/A 10/26/2023    Procedure: COLONOSCOPY;  Surgeon: Noah Duong MD;  Location: [facility] (4TH FLR);  Service: Endoscopy;  Laterality: N/A;  instructions sent to patient portal. Tb  referral: Dr. Wilson  Pt. on Xarelto--tb-ok to hold see te 8/15/23 dr vu  10/13-precall complete-MS  10/19 pt rescheduled, Xarelto hold, PEG, portal -ml  10/24-precall complete-KPvt    CORONARY ANGIOGRAPHY N/A 9/30/2020    Procedure: ANGIOGRAM, CORONARY ARTERY;  Surgeon: John West MD;  Location: [facility] CATH LAB;  Service: Cardiology;  Laterality: N/A;    CYSTOSCOPY N/A 2/17/2022    Procedure: CYSTOSCOPY;  Surgeon: Lukasz Hughes MD;  Location: [facility] OR 1ST FLR;  Service: Urology;  Laterality: N/A;    CYSTOSCOPY W/ URETERAL STENT PLACEMENT N/A 2/25/2022    Procedure: CYSTOSCOPY, WITH URETERAL STENT INSERTION;  Surgeon: Lukasz Hughes MD;  Location: [facility] OR 1ST FLR;  Service: Urology;  Laterality: N/A;    ENDOSCOPIC ULTRASOUND OF UPPER GASTROINTESTINAL TRACT N/A 12/7/2022    Procedure: ULTRASOUND, UPPER GI TRACT, ENDOSCOPIC;  Surgeon: Darian Main MD;  Location: [facility];  Service: Endoscopy;  Laterality: N/A;  Approval to hold Xarelto rec'd from Dr. Vu (see t/e 12/5/22)-DS    ESOPHAGOGASTRODUODENOSCOPY N/A 11/17/2022    Procedure: EGD (ESOPHAGOGASTRODUODENOSCOPY);  Surgeon: Brody Gonzales MD;  Location: [facility] (2ND FLR);  Service: Endoscopy;  Laterality: N/A;  inst via email-ok to hold Xarelto x 2 days-MS    ESOPHAGOGASTRODUODENOSCOPY N/A 5/31/2023    Procedure: EGD (ESOPHAGOGASTRODUODENOSCOPY) WITH DILATION;  Surgeon: Santana Brown Jr., MD;  Location: [facility] [address] [place] [address];  Service: General;  Laterality: N/A;    LASER LITHOTRIPSY Left 2/25/2022    Procedure:  LITHOTRIPSY, USING LASER;  Surgeon: Lukasz Hughes MD;  [facility];  Service: Urology;  Laterality: Left;    LEFT HEART CATHETERIZATION Left 9/30/2020    Procedure: Left heart cath;  Surgeon: John West MD;  Location: [facility] CATH LAB;  Service: Cardiology;  Laterality: Left;    LITHOTRIPSY      PARATHYROIDECTOMY  1/1/2-107    PYELOSCOPY Left 2/25/2022    Procedure: PYELOSCOPY;  Surgeon: Lukasz Hughes MD;  Location: [facility] OR [address] [place] [address];  Service: Urology;  Laterality: Left;    ROBOT-ASSISTED SURGICAL REMOVAL OF ESOPHAGUS USING DA CARLOS XI N/A 3/14/2023    Procedure: XI ROBOTIC ESOPHAGECTOMY;  Surgeon: Santana Brown Jr., MD;  Location: [address];  Service: General;  Laterality: N/A;  Abdomen, Chest and Neck    ROBOTIC JEJUNOSTOMY N/A 3/14/2023    Procedure: ROBOTIC JEJUNOSTOMY TUBE INSERTION;  Surgeon: Santana Brown Jr., MD;  [address];  Service: General;  Laterality: N/A;    TRANSESOPHAGEAL ECHOCARDIOGRAPHY N/A 4/7/2022    Procedure: ECHOCARDIOGRAM, TRANSESOPHAGEAL;  Surgeon: [facility] Provider;  Location: [facility] EP LAB;  Service: Cardiology;  Laterality: N/A;    TREATMENT OF CARDIAC ARRHYTHMIA N/A 4/7/2022    Procedure: Cardioversion or Defibrillation;  Surgeon: Iris Fisher NP;  Location: [facility] EP LAB;  Service: Cardiology;  Laterality: N/A;  afib, dccv, artie, anes, EH, 3prep    URETEROSCOPIC REMOVAL OF URETERIC CALCULUS Left 2/25/2022    Procedure: REMOVAL, CALCULUS, URETER, URETEROSCOPIC;  Surgeon: Lukasz Hughes MD;  Location: [address];  Service: Urology;  Laterality: Left;    URETEROSCOPY Left 2/25/2022    Procedure: URETEROSCOPY;  Surgeon: Lukasz Hughes MD;  Location: [facility] OR [address] [place] [address];  Service: Urology;  Laterality: Left;    VOCAL CORD INJECTION Left 3/17/2023    Procedure: INJECTION, VOCAL CORD, LARYNGOSCOPIC;  Surgeon: Gm Altman MD;  Location: [facility] OR [address] [place] [address];  Service: ENT;  Laterality: Left;      Family History:    Family History   Problem Relation Age of Onset    Arthritis Mother     Heart disease Father 70        CABG    Arthritis Father     Diabetes Father     Kidney disease Father         had one kidney removed in early thirties    Arthritis Sister     Arthritis Sister     Hypertension Sister     Colon cancer Brother 32    Arthritis Brother     Alcohol abuse Paternal Aunt     Cancer Maternal Grandfather         throat cancer    Diabetes Paternal Grandmother     Colon polyps Paternal Grandfather     Colon cancer Paternal Grandfather     Cancer Paternal Grandfather         colon cancer at age 62      Social History:   Social History     Tobacco Use    Smoking status: Former     Current packs/day: 0.00     Average packs/day: 1 pack/day for 22.7 years (22.7 ttl pk-yrs)     Types: Cigarettes, Cigars     Start date: 1972     Quit date:      Years since quittin.5     Passive exposure: Never    Smokeless tobacco: Never    Tobacco comments:     smoking was off and on.  cumulative 7 years.  never more than three years in one stretch or more lj   Substance Use Topics    Alcohol use: Yes     Alcohol/week: 4.0 standard drinks of alcohol     Types: 4 Shots of liquor per week      I have reviewed and updated the patient's past medical, surgical, family and social histories.    Allergies:   Review of patient's allergies indicates:  No Known Allergies     Medications:   Current Outpatient Medications   Medication Sig Dispense Refill    allopurinoL (ZYLOPRIM) 100 MG tablet TAKE 1 TABLET BY MOUTH EVERY DAY 30 tablet 10    blood sugar diagnostic (ACCU-CHEK ANTONELLA PLUS TEST STRP) Strp Use to test CBG four times daily E11.65 400 strip 3    blood-glucose meter kit Checks blood sugars 1x/daily. 1 each 12    citric acid-potassium citrate (POLYCITRA) 1,100-334 mg/5 mL solution Take 10 mLs (20 mEq total) by mouth 2 (two) times a day. 600 mL 5    hydroCHLOROthiazide (HYDRODIURIL) 25 MG tablet Take 1 tablet (25 mg total) by mouth once  "daily. 30 tablet 11    lancets (ACCU-CHEK SOFTCLIX LANCETS) Misc Use to test CBG 4 times daily E11.65 400 each 1    metoprolol succinate (TOPROL-XL) 25 MG 24 hr tablet Take 0.5 tablets (12.5 mg total) by mouth once daily. 15 tablet 11    nitroGLYCERIN (NITROSTAT) 0.4 MG SL tablet Place 1 tablet (0.4 mg total) under the tongue every 5 (five) minutes as needed for Chest pain. If you need a third tablet, call 911 60 tablet 12    omeprazole (PRILOSEC) 40 MG capsule TAKE 1 CAPSULE(40 MG) BY MOUTH EVERY DAY 90 capsule 3    rivaroxaban (XARELTO) 20 mg Tab Take 1 tablet (20 mg total) by mouth daily with dinner or evening meal. 90 tablet 3    rosuvastatin (CRESTOR) 20 MG tablet Take 1 tablet (20 mg total) by mouth every evening. 90 tablet 3    tamsulosin (FLOMAX) 0.4 mg Cap TAKE 1 CAPSULE(0.4 MG) BY MOUTH EVERY DAY 30 capsule 2    testosterone (ANDROGEL) 20.25 mg/1.25 gram (1.62 %) GlPm Apply 4 pumps to shoulders daily 2 each 5    testosterone cypionate (DEPOTESTOTERONE CYPIONATE) 200 mg/mL injection INJECT 2ML INTRAMUSCULARLY ONCE A MONTH AS DIRECTED      traMADoL (ULTRAM) 50 mg tablet Take 1 tablet (50 mg total) by mouth every 8 (eight) hours as needed for Pain. 21 tablet 0    triamcinolone acetonide 0.1% (KENALOG) 0.1 % cream Apply topically 2 (two) times daily. 45 g 1     No current facility-administered medications for this visit.      Physical Exam:   /60 (BP Location: Left arm, Patient Position: Sitting, BP Method: Medium (Automatic))   Pulse (!) 57   Temp 98.7 °F (37.1 °C) (Oral)   Resp 18   Ht 5' 9" (1.753 m)   Wt 86.8 kg (191 lb 5.8 oz)   SpO2 98%   BMI 28.26 kg/m²      ECOG Performance status: 0    Physical Exam  Vitals reviewed.   Constitutional:       General: He is not in acute distress.     Appearance: Normal appearance. He is not ill-appearing, toxic-appearing or diaphoretic.   HENT:      Head: Normocephalic and atraumatic.      Right Ear: External ear normal.      Left Ear: External ear normal. "      Nose: Nose normal. No congestion.      Mouth/Throat:      Pharynx: Oropharynx is clear.   Eyes:      General: No scleral icterus.     Extraocular Movements: Extraocular movements intact.      Conjunctiva/sclera: Conjunctivae normal.      Pupils: Pupils are equal, round, and reactive to light.   Neck:      Comments: L neck wound well-healed  Cardiovascular:      Rate and Rhythm: Normal rate and regular rhythm.      Heart sounds: Normal heart sounds. No murmur heard.  Pulmonary:      Effort: Pulmonary effort is normal. No respiratory distress.      Breath sounds: Normal breath sounds. No wheezing or rales.   Abdominal:      General: Bowel sounds are normal. There is no distension.      Palpations: Abdomen is soft.      Tenderness: There is no abdominal tenderness.   Musculoskeletal:         General: No swelling.      Cervical back: Normal range of motion.   Lymphadenopathy:      Cervical: No cervical adenopathy.   Skin:     Coloration: Skin is not jaundiced.      Findings: Rash (grade I rash to upper chest/back.) present. No bruising or erythema.   Neurological:      General: No focal deficit present.      Mental Status: He is alert and oriented to person, place, and time. Mental status is at baseline.      Cranial Nerves: No cranial nerve deficit.      Motor: No weakness.      Gait: Gait normal.   Psychiatric:         Mood and Affect: Mood normal.         Behavior: Behavior normal.         Thought Content: Thought content normal.         Judgment: Judgment normal.         Labs:   Recent Results (from the past 48 hour(s))   Magnesium    Collection Time: 11/14/23  9:37 AM   Result Value Ref Range    Magnesium 2.0 1.6 - 2.6 mg/dL   PHOSPHORUS    Collection Time: 11/14/23  9:37 AM   Result Value Ref Range    Phosphorus 3.7 2.7 - 4.5 mg/dL   BILIRUBIN, DIRECT    Collection Time: 11/14/23  9:37 AM   Result Value Ref Range    Bilirubin, Direct 0.3 0.1 - 0.3 mg/dL   CBC W/ AUTO DIFFERENTIAL    Collection Time: 11/14/23   9:37 AM   Result Value Ref Range    WBC 4.14 3.90 - 12.70 K/uL    RBC 4.46 (L) 4.60 - 6.20 M/uL    Hemoglobin 13.1 (L) 14.0 - 18.0 g/dL    Hematocrit 40.8 40.0 - 54.0 %    MCV 92 82 - 98 fL    MCH 29.4 27.0 - 31.0 pg    MCHC 32.1 32.0 - 36.0 g/dL    RDW 15.2 (H) 11.5 - 14.5 %    Platelets 129 (L) 150 - 450 K/uL    MPV 10.4 9.2 - 12.9 fL    Immature Granulocytes 0.7 (H) 0.0 - 0.5 %    Gran # (ANC) 3.0 1.8 - 7.7 K/uL    Immature Grans (Abs) 0.03 0.00 - 0.04 K/uL    Lymph # 0.5 (L) 1.0 - 4.8 K/uL    Mono # 0.4 0.3 - 1.0 K/uL    Eos # 0.2 0.0 - 0.5 K/uL    Baso # 0.05 0.00 - 0.20 K/uL    nRBC 0 0 /100 WBC    Gran % 71.3 38.0 - 73.0 %    Lymph % 12.8 (L) 18.0 - 48.0 %    Mono % 9.9 4.0 - 15.0 %    Eosinophil % 4.1 0.0 - 8.0 %    Basophil % 1.2 0.0 - 1.9 %    Differential Method Automated    COMPREHENSIVE METABOLIC PANEL    Collection Time: 11/14/23  9:37 AM   Result Value Ref Range    Sodium 143 136 - 145 mmol/L    Potassium 5.0 3.5 - 5.1 mmol/L    Chloride 106 95 - 110 mmol/L    CO2 30 (H) 23 - 29 mmol/L    Glucose 96 70 - 110 mg/dL    BUN 14 8 - 23 mg/dL    Creatinine 1.0 0.5 - 1.4 mg/dL    Calcium 9.8 8.7 - 10.5 mg/dL    Total Protein 7.0 6.0 - 8.4 g/dL    Albumin 3.9 3.5 - 5.2 g/dL    Total Bilirubin 0.7 0.1 - 1.0 mg/dL    Alkaline Phosphatase 92 55 - 135 U/L    AST 21 10 - 40 U/L    ALT 22 10 - 44 U/L    eGFR >60.0 >60 mL/min/1.73 m^2    Anion Gap 7 (L) 8 - 16 mmol/L     Imaging:    10/13/23 - CT CAP:    Impression:     1. Postsurgical change of esophagectomy and gastric pull-through for esophageal adenocarcinoma.  No signs of recurrence or metastatic disease.  2. Additional findings as above.  RECIST SUMMARY:     Date of prior examination for comparison: 04/11/2023     No measurable lesions for RECIST criteria.    Path:   3/14/23:  Final Pathologic Diagnosis 1. LYMPH NODE, LEVEL 7, EXCISION:   One benign lymph node (0/1)   2. TOTAL THORACIC ESOPHAGECTOMY AND PROXIMAL STOMACH:   Adenocarcinoma, moderately  differentiated, 3.0 cm   Margins are negative   Tumor invades adventitia   Extensive residual cancer with no evident tumor regression (poor or no   response, score 3)   Eleven benign lymph nodes (0/11)   3. RESIDUAL CONDUIT, EXCISION:   Negative for malignancy   SYNOPTIC REPORT   Procedure - Esophageal gastrectomy   Tumor site - GE Junction   Relationship of tumor to esophagogastric junction - Lesion is central at GE   junction   Tumor size - 3.0 x 3.1cm   Histologic type - Adenocarcinoma   Histologic grade - Moderately differentiated   Microscopic tumor extension - Tumor invades adventitia   Margins - All margins of resection are uninvolved, proximal, distal and   adventitial margins are negative   Treatment effect - Extensive residual cancer with no evident tumor regression   (poor or no response, score 3)   Lymphovascular invasion - Not identified   Pathologic staging - yp T3 N0 Mx   Lymph nodes examined - 12   Lymph nodes involved - 0   Additional pathologic findings - Intestinal metaplasia present   MMR-IHC has been performed on previous biopsy ([id number]) and shows all 4   antibodies intact          Assessment:       No diagnosis found.    Plan:     # Esophageal adenocarcinoma   Mr. Person is a pleasant 67 year old male who presents to our clinic for management of esophageal cancer. He initially presented with dysphagia, and further workup confirmed a stage II adenocarcinoma, pMMR. Tumor staged T3N0Mx by endosonographic criteria, JEVON. CT CAP on 12/14/22 confirmed no evidence of metastatic disease.    Previously conversation regarding his diagnosis and treatment options.  Recommended perioperative chemo/radiation per the CROSS trial. Discussed chemoradiation for ~5 weeks with 5 doses of weekly carboplatin and taxol administered. Plan to obtain restaging scans 4-5 weeks after radiation completed.     He was a candidate for a cooperative group trial assessing perioperative immunotherapy treatment. He consented  for this study - ECOG-ACRIN [id number]: A Phase II/III Study of Dilcia-operative Nivolumab and Ipilimumab in Patients with Locoregional Esophageal and Gastroesophageal Junction Adenocarcinoma    Previously met with Dr. Santana Brown who agreed he was a surgical candidate.  Previously met with Dr. Javier Moulton who will be treating him with radiation.    Began cycle 1 carboplatin/paclitaxel/nivolumab on trial on 12/29/22.  Received cycle 4 of weekly chemotherapy on trial on 1/20/23.   We held chemotherapy for week 5 because of thrombocytopenia per protocol.    Completed radiation on 2/1/23.    Restaging PET/CT personally reviewed on 3/1/23 shows interval decreased FDG avidity in esophageal tumor, no new sites of disease.  CT CAP 3/2/23 shows stable esophageal thickening.    Underwent robotic esophagectomy on 3/14/23 with Dr. Brown.  Pathology showed negative margins, ypT3 N0 tumor with 0 of 12 lymph nodes involved.  No treatment effect was noted on the tumor tissue.    Discussed that per the AO1386 protocol will proceed with randomization to adjuvant nivolumab +/- ipilimumab.  Patient randomized to receive adjuvant Nivolumab, started cycle 1 at 480 mg q4 weeks 5/1/23.  Plan for twelve months per protocol.    Tolerated very well. Grade 1 pruritis.    Repeat imaging after cycle 6 shows DAVE.    Proceed with cycle 8 today at 480 mg q4 weeks.  Labs reviewed, adequate for treatment.   Timeout occurred with myself and Maria Esther SORTO. Orders signed.    Underwent dilation with Dr. Brown on 5/31/23 with temporary improvement in dysphagia. Weight increased today.  PD-L1 CPS 30.    RTC in 4 weeks.    # Vocal cord paralysis  Post-op. S/p injection by ENT.  Stable.  Last evaluated 9/11/23 by ENT with improvement.    No further interventions planned at this time.    # HTN, HLD, DM, CKD  Following with PCP Dr. Wilson and nephrologist Dr. Oconnell.   BP controlled in clinic today. Has been off his HCTZ/lisinopril because of prior  hypotension.  Cr stable.    # CAD, A fib, CHF  Following with cardiologist Dr. Nick & EP Dr. Phillip.   Continues on Xarelto.  Continue medical management.     # Dental Infection  Continues with several procedures.  No active infection currently.  Should not interfere with nivolumab treatment.  Monitor.     # Rash  Grade I, very mild. Likely 2/2 immunotherapy.   Continue triamcinolone - symptoms controlled.   Continue to monitor.     Follow up: 4 weeks per research.    Patient is in agreement with the proposed treatment plan. All questions were answered to the patient's satisfaction. Pt knows to call clinic if anything is needed before the next clinic visit.    Patient discussed with collaborating physician, Dr. Medrano.    At least 40 minutes were spent today on this encounter including face to face time with the patient, data gathering/interpretation and documentation.       Bernadette Patterson, MSN, APRN, [facility]  Hematology and Medical Oncology  Clinical Nurse Specialist to Dr. Medrano, Dr. Smith & Dr. David    Route Chart for Scheduling    Med Onc Chart Routing      Follow up with physician . Per research   Follow up with SAMUEL    Infusion scheduling note    Injection scheduling note    Labs    Imaging    Pharmacy appointment    Other referrals              Treatment Plan Information   [facility] [id number] ARM C ADJUVANT NIVOLUMAB STEP 2   Miki Medrano MD   Upcoming Treatment Dates - [facility] [id number] ARM C ADJUVANT NIVOLUMAB STEP 2    11/13/2023       Chemotherapy       INV nivolumab 480 mg in INV sodium chloride 0.9 % 100 mL chemo infusion  12/11/2023       Chemotherapy       INV nivolumab 480 mg in INV sodium chloride 0.9 % 100 mL chemo infusion  1/8/2024       Chemotherapy       INV nivolumab 480 mg in INV sodium chloride 0.9 % 100 mL chemo infusion  2/5/2024       Chemotherapy       INV nivolumab 480 mg in INV sodium chloride 0.9 % 100 mL chemo infusion    Supportive Plan Information  IV FLUIDS AND ELECTROLYTES    Mkii Medrano MD   Upcoming Treatment Dates - IV FLUIDS AND ELECTROLYTES    No upcoming days in selected categories.    Therapy Plan Information  Flushes  heparin, porcine (PF) 100 unit/mL injection flush 500 Units  500 Units, Intravenous, Every visit  sodium chloride 0.9% flush 10 mL  10 mL, Intravenous, Every visit

## 2023-11-15 ENCOUNTER — RESEARCH ENCOUNTER (OUTPATIENT)
Dept: RESEARCH | Facility: HOSPITAL | Age: 68
End: 2023-11-15
Payer: MEDICARE

## 2023-11-15 NOTE — PROGRESS NOTES
: URIEL Manriquez MD  Treating Investigators: NIRAV Medrano MD  Surgical Oncologist: SARAH Brown M.D. 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 Patient queried & verbalized his consent for continued participation in above-mentioned trial. Patient queried and continues to report mild fatigue, dysphagia, pruritic dermatis in his chest, upper back & scalp that is controlled with the topical cream. Patient reports DC'ing Tramadol & Tizanidine ~2 weeks ago. Patient denies any new AEs or other ConMed changes.    Patient completed safety labs, VS, PE & ECOG (ECOG = 0, per Bernadette Ness NP) today per protocol. Bernadette Ness assessed all patient's labs, VS & PE findings as either WNL or NCS, deeming patient eligible to continue treatment with Nivolumab monotherapy.     Weight:  Step 1 Week 1 was 117.2 kg  Step 2 C1D1 was 99.2 kg   ( +/- 10% = 89.3 - 109.2 kg).   Today's weight is 86.8 kg   >20% weight loss since Step 1 Week 1 --> >10% - < 20%change from Step 2 Cycle 1.      Per protocol, Nivolumab is administered at a 480 mg flat dose & cannot be modified. CRC & Bernadette Ness NP performed time-out in exam room.     Infusion RN Elena Byrne was instructed to administer the treatment per the treatment plan with full sets of vital signs pre- and post-dose. RN expressed their understanding of all instructions. Patient completed treatment today without event & was DC'd from clinic ambulatory and in stable condition. Please see Infusion RN note for further information.     Patient was  encouraged to contact CRC or Dr. Medrano's team with any questions or concerns & was reminded of clinic's 24/7 emergency contact number. Patient verbalized understanding & denies any additional questions at this time.        Step 2 -- C9D1 - [id number]:  Labs @ 1230 -- [facility] 3rd floor  [facility] @ 1330 -- 2nd floor  Infusion @ 1400 (Nivolumab only)        Solicited AEs -- Assessed 21Aug2023:  ** Anemia - Grade 1 -- (NCS per MD) -- continued from prior visit  ** Platelet count decreased - Grade 1 -- 97Rnj9616 (NCS per MD) -- continued from prior visit  ** White blood cell count decreased - Grade 1 -- 98Plh2332  (NCS per MD)    28Ena5421 -- 69Gfc8974 (RESOLVED)  Neutrophil count decreased - Grade 0  ** Lymphocyte count decreased - Grade 3 30Atz9559 - 50Xah6360  (NCS per MD)   [id number] = Grade 2 -- NCS per MD  Peripheral motor neuropathy - Grade 0   Peripheral sensory neuropathy - Grade 0   Creatinine increased - Grade 0  Hypothyroidism - Grade 0   (TSH WNL on 16Oct2023)  Diarrhea (patient baseline BM = 2 stools a day) - Grade 0 -- reports loose stools vs. diarrhea   **Fatigue - Grade 1 -- 49Qff5081 - ongoing (NCS per MD)  **Nausea - Grade 1 - 00Fkp4496  (NCS per MD)  **Cough - Grade 1  -- Medical History  Pneumonitis - Grade 0  **Hyperglycemia - Grade 1 -- (Medical History of DMT2 was ended when medications DC'd) -- 28Yla4471  (NCS per MD)  Adrenal Insufficiency - Grade 0 -- 75Hgt8980 ACTH & cortisol WNL  **Rash-maculo-papular - Grade 1 - 07Wbm8022 - ongoing ( used Sarna [camphor 0.5% - menthol 0.5&] PRN -- Prescribed triamcinolone 24Jul2023 ) -- (NCS per MD)  **Pruritis - Grade 1 - 42Hag1420 -- (NCS per MD)  Erythema multiforme - Grade 0  Vomiting - Grade 0    Lipase increased -- Not required per protocol and therefore not assessed this visit.      Ongoing Non-Solicited AEs:  Anorexia - Grade 1 - 09Jan2023 - ongoing (no treatment)  Hoarseness - Grade 2 - 14Mar2023 - ongoing ( no treatment )  Dysphagia - Grade 1 -  80Yfr1870 - ongoing ( no treatment )  Weight loss - Grade 2 - 22Mzs1231 - ongoing ( no treatment )      New or Changed Non-Solicited AEs Since Last Visit:  Rotator cuff injury - Grade 1 -- 77Qbk0168 - 01Nov  ( PRN Tramadol & Tizanidine -- DC'd  01Nov2023 )       Complete Baseline Medical History, Adverse Events, and Concomitant Medications are located in patient's shadow chart

## 2023-11-21 ENCOUNTER — OFFICE VISIT (OUTPATIENT)
Dept: CARDIOLOGY | Facility: CLINIC | Age: 68
End: 2023-11-21
Payer: MEDICARE

## 2023-11-21 VITALS
DIASTOLIC BLOOD PRESSURE: 60 MMHG | SYSTOLIC BLOOD PRESSURE: 116 MMHG | BODY MASS INDEX: 25.78 KG/M2 | WEIGHT: 190.38 LBS | HEART RATE: 56 BPM | HEIGHT: 72 IN

## 2023-11-21 DIAGNOSIS — I25.10 CORONARY ARTERY DISEASE INVOLVING NATIVE CORONARY ARTERY OF NATIVE HEART WITHOUT ANGINA PECTORIS: ICD-10-CM

## 2023-11-21 DIAGNOSIS — I50.20 HFREF (HEART FAILURE WITH REDUCED EJECTION FRACTION): ICD-10-CM

## 2023-11-21 DIAGNOSIS — I70.0 AORTIC ATHEROSCLEROSIS: ICD-10-CM

## 2023-11-21 DIAGNOSIS — I48.0 PAROXYSMAL ATRIAL FIBRILLATION: Chronic | ICD-10-CM

## 2023-11-21 PROCEDURE — 99214 OFFICE O/P EST MOD 30 MIN: CPT | Mod: PBBFAC,PO | Performed by: INTERNAL MEDICINE

## 2023-11-21 PROCEDURE — 99999 PR PBB SHADOW E&M-EST. PATIENT-LVL IV: CPT | Mod: PBBFAC,,, | Performed by: INTERNAL MEDICINE

## 2023-11-21 PROCEDURE — 93005 ELECTROCARDIOGRAM TRACING: CPT | Mod: PBBFAC,PO | Performed by: INTERNAL MEDICINE

## 2023-11-21 PROCEDURE — 99214 OFFICE O/P EST MOD 30 MIN: CPT | Mod: S$PBB,,, | Performed by: INTERNAL MEDICINE

## 2023-11-21 PROCEDURE — 93010 ELECTROCARDIOGRAM REPORT: CPT | Mod: S$PBB,,, | Performed by: INTERNAL MEDICINE

## 2023-11-21 RX ORDER — NITROGLYCERIN 0.4 MG/1
0.4 TABLET SUBLINGUAL EVERY 5 MIN PRN
Qty: 100 TABLET | Refills: 3 | Status: SHIPPED | OUTPATIENT
Start: 2023-11-21

## 2023-11-21 NOTE — PROGRESS NOTES
Subjective:   Chief Complaint:  Lukasz Person is a 68 y.o. male who presents for follow-up of Coronary Artery Disease and Hyperlipidemia      Problem List and HPI:   Non-obstructive CAD  - 50% PDA  Mildly reduced LV systolic function  Diabetes mellitus since his late 50s/early 60s  Hypertension since his late 50s/early 60s  Mixed hyperlipidemia  Paroxysmal atrial fibrillation in setting of pyelonephritis 4/2022  KUMAR - 2006  Lithotripsy  Parathyroidectomy 2017  7 pack year h/o smoking, quit in 1972  Esophageal cancer 3/2023    Diagnosed with esophageal adenocarcinoma.  Underwent robotic esophagectomy and jejunostomy. Received carboplatin and paclitaxel.    He has a h/o mildly reduced LV systolic function. LVEF 45% increased to 60%.  Nonobstructive CAD by catheterization in 09/2020. He does not report angina or shortness of breath with exertion.   He does not report orthopnea, PND or edema. He does not report palpitations, lightheadedness or dizziness.. He does not report excessive bruising, nose bleeds, melena or bleeding from other sites.       Review of patient's allergies indicates:  No Known Allergies     Current Outpatient Medications   Medication Sig    allopurinoL (ZYLOPRIM) 100 MG tablet TAKE 1 TABLET BY MOUTH EVERY DAY    blood sugar diagnostic (ACCU-CHEK ANTONELLA PLUS TEST STRP) Strp Use to test CBG four times daily E11.65    blood-glucose meter kit Checks blood sugars 1x/daily.    citric acid-potassium citrate (POLYCITRA) 1,100-334 mg/5 mL solution Take 10 mLs (20 mEq total) by mouth 2 (two) times a day.    hydroCHLOROthiazide (HYDRODIURIL) 25 MG tablet Take 1 tablet (25 mg total) by mouth once daily.    lancets (ACCU-CHEK SOFTCLIX LANCETS) Misc Use to test CBG 4 times daily E11.65    metoprolol succinate (TOPROL-XL) 25 MG 24 hr tablet Take 0.5 tablets (12.5 mg total) by mouth once daily.    nitroGLYCERIN (NITROSTAT) 0.4 MG SL tablet Place 1 tablet (0.4 mg total) under the tongue every 5 (five) minutes as  needed for Chest pain. If you need a third tablet, call 911    omeprazole (PRILOSEC) 40 MG capsule TAKE 1 CAPSULE(40 MG) BY MOUTH EVERY DAY    rivaroxaban (XARELTO) 20 mg Tab Take 1 tablet (20 mg total) by mouth daily with dinner or evening meal.    rosuvastatin (CRESTOR) 20 MG tablet Take 1 tablet (20 mg total) by mouth every evening.    tamsulosin (FLOMAX) 0.4 mg Cap TAKE 1 CAPSULE(0.4 MG) BY MOUTH EVERY DAY    testosterone (ANDROGEL) 20.25 mg/1.25 gram (1.62 %) GlPm Apply 4 pumps to shoulders daily    triamcinolone acetonide 0.1% (KENALOG) 0.1 % cream Apply topically 2 (two) times daily.         Social history:  Lukasz Person  reports that he quit smoking about 28 years ago. His smoking use included cigarettes and cigars. He started smoking about 51 years ago. He has a 22.7 pack-year smoking history. He has never been exposed to tobacco smoke. He has never used smokeless tobacco. He reports current alcohol use of about 4.0 standard drinks of alcohol per week. He reports that he does not currently use drugs after having used the following drugs: Marijuana.      Objective:   /60   Pulse (!) 56   Ht 6' (1.829 m)   Wt 86.4 kg (190 lb 5.9 oz)   BMI 25.82 kg/m²    Physical Exam  Constitutional:       Appearance: He is well-developed.      Comments:      HENT:      Head: Normocephalic.   Neck:      Vascular: No JVD.   Cardiovascular:      Rate and Rhythm: Normal rate and regular rhythm.      Heart sounds: S1 normal and S2 normal. No murmur heard.  Pulmonary:      Breath sounds: Normal breath sounds.   Chest:      Chest wall: There is no dullness to percussion.   Abdominal:      Palpations: Abdomen is soft. There is no hepatomegaly, splenomegaly or mass.   Musculoskeletal:      Right lower leg: No edema.      Left lower leg: No edema.   Skin:     Findings: No bruising.      Nails: There is no clubbing.   Neurological:      Mental Status: He is alert and oriented to person, place, and time.      Gait: Gait  normal.   Psychiatric:         Speech: Speech normal.         Behavior: Behavior normal.         Lipids:  Recent Labs   Lab 11/14/23  0937   LDL Cholesterol 53.6 L   HDL 53   Cholesterol 120      Renal:  Component      Latest Ref Rng 11/14/2023   Sodium      136 - 145 mmol/L 143    Potassium      3.5 - 5.1 mmol/L 5.0    Chloride      95 - 110 mmol/L 106    CO2      23 - 29 mmol/L 30 (H)    Glucose      70 - 110 mg/dL 96    BUN      8 - 23 mg/dL 14    Creatinine      0.5 - 1.4 mg/dL 1.0       Legend:  (H) High  CBC:  Lab Results   Component Value Date    WBC 4.56 12/07/2023    HGB 13.6 (L) 12/07/2023    HCT 41.8 12/07/2023    MCV 92 12/07/2023     12/07/2023         Results for orders placed during the hospital encounter of 05/26/22    Echo Saline Bubble? No    Interpretation Summary  · Mild left atrial enlargement.  · The left ventricle is normal in size with normal systolic function.  · The estimated ejection fraction is 60%.  · Normal left ventricular diastolic function.  · Normal right ventricular size with normal right ventricular systolic function.  · Aortic sclerosis w/o stenosis.  · The estimated PA systolic pressure is 31 mmHg.  · Normal central venous pressure (3 mmHg).       No results found for this or any previous visit.      Results for orders placed or performed during the hospital encounter of 10/13/23 (from the past 2160 hour(s))   CT Chest Abdomen Pelvis With Contrast (xpd)    Narrative    EXAMINATION:  CT CHEST ABDOMEN PELVIS WITH CONTRAST (XPD)    CLINICAL HISTORY:  Esophageal cancer, assess treatment response;Clinical Trial Participant; Malignant neoplasm of esophagus, unspecified    TECHNIQUE:  Low dose axial images, sagittal and coronal reformations were obtained from the thoracic inlet to the pubic symphysis following the IV administration of 75 mL of Omnipaque 350.  450 mL barium sulfate oral suspension was administered.    COMPARISON:  CT 04/11/2023, 03/02/2023    FINDINGS:  LINES &  TUBES:  Right chest wall implanted tunnel central venous catheter with tip terminating at the cavoatrial junction    THORACIC SOFT TISSUES:  No axillary or subpectoral lymphadenopathy. The visualized thyroid gland is unremarkable.    HEART & MEDIASTINUM:  No mediastinal or hilar lymphadenopathy. Central pulmonary arteries are patent.  Heart is normal size.  No mediastinal or hilar lymphadenopathy.    PLEURA:  No pleural effusion or pneumothorax.    LUNGS & AIRWAYS:  Airways are patent.   Subcentimeter right lower lobe granuloma.    HEPATOBILIARY:  No focal hepatic lesions. No biliary ductal dilatation. Normal gallbladder. Portal vein is patent.    SPLEEN: Accessory splenule.  No splenomegaly.    PANCREAS:  No focal masses or ductal dilatation.    ADRENALS:  No adrenal nodules.    KIDNEYS/URETERS: Stable left cortical fluid-soft tissue density lesion with fat component (axial series 3, image 66), possibly small AML.  Punctate right renal hypodensities, too small to characterize.  No hydronephrosis, stones or solid mass lesions. Ureters are unremarkable.    PELVIC ORGANS/BLADDER:  Bladder is moderately distended without focal wall thickening.  Prostate is unremarkable.    PERITONEUM / RETROPERITONEUM:  No free air. No free fluid.    LYMPH NODES:  No lymphadenopathy.    VESSELS: Aorta is normal in caliber.  Trace aortoiliac calcific plaque.  IVC is unremarkable.  Portal vein is patent.    GI TRACT: Postsurgical change of esophagectomy with gastric pull-through.  No distension or wall thickening. Normal appendix.    BONES AND ABDOMINAL SOFT TISSUES:  Soft tissues are unremarkable. No acute fractures. No aggressive osseous lesions.      Impression    1. Postsurgical change of esophagectomy and gastric pull-through for esophageal adenocarcinoma.  No signs of recurrence or metastatic disease.  2. Additional findings as above.  RECIST SUMMARY:    Date of prior examination for comparison: 04/11/2023    No measurable lesions  for RECIST criteria.    Electronically signed by resident: Fortino Milligan  Date:    10/13/2023  Time:    16:01    Electronically signed by: Patrick Hill MD  Date:    10/13/2023  Time:    16:45     *Note: Due to a large number of results and/or encounters for the requested time period, some results have not been displayed. A complete set of results can be found in Results Review.           Assessment and Plan:       ICD-10-CM ICD-9-CM   1. Paroxysmal atrial fibrillation  I48.0 427.31   2. HFrEF (heart failure with reduced ejection fraction)  I50.20 428.20   3. Coronary artery disease involving native coronary artery of native heart without angina pectoris  I25.10 414.01   4. Aortic atherosclerosis  I70.0 440.0        Atrial fibrillation stable. Continue anticoagulation. Avoid NSAIDs.   CHF stable. On a beta-blocker, without an ARB/ARNI, SGLT-2 or a mineralocorticoid antagonist due to relative hypotension. Recovering from Chemotherapy. Review BPs at home. Add an ARNI or ARB if BP improves. Last assessment of LV function >1 year ago, will obtain echo.  CAD stable. No angina. Continue same meds.      Orders placed during this encounter:     Paroxysmal atrial fibrillation  -     EKG 12-lead  -     Echo; Future; Expected date: 11/21/2023  -     EKG 12-lead; Future; Expected date: 07/03/2024  -     CBC Without Differential; Future; Expected date: 07/03/2024    HFrEF (heart failure with reduced ejection fraction)  -     Ambulatory referral/consult to Cardiology  -     Comprehensive Metabolic Panel; Future; Expected date: 07/03/2024    Coronary artery disease involving native coronary artery of native heart without angina pectoris  -     nitroGLYCERIN (NITROSTAT) 0.4 MG SL tablet; Place 1 tablet (0.4 mg total) under the tongue every 5 (five) minutes as needed for Chest pain. If you need a third tablet, call 911  Dispense: 100 tablet; Refill: 3  -     Lipid Panel; Future; Expected date: 07/03/2024    Aortic  atherosclerosis         Follow up in about 8 months (around 7/21/2024) for SAMUEL.

## 2023-11-22 ENCOUNTER — CLINICAL SUPPORT (OUTPATIENT)
Dept: REHABILITATION | Facility: HOSPITAL | Age: 68
End: 2023-11-22
Payer: MEDICARE

## 2023-11-22 DIAGNOSIS — R53.81 PHYSICAL DECONDITIONING: Primary | ICD-10-CM

## 2023-11-22 DIAGNOSIS — F43.29 STRESS AND ADJUSTMENT REACTION: ICD-10-CM

## 2023-11-22 PROCEDURE — 97112 NEUROMUSCULAR REEDUCATION: CPT

## 2023-11-22 PROCEDURE — 97535 SELF CARE MNGMENT TRAINING: CPT

## 2023-11-22 PROCEDURE — 97110 THERAPEUTIC EXERCISES: CPT

## 2023-11-22 NOTE — PROGRESS NOTES
"  [facility]  Occupational Therapy Treatment Note - Therapeutic Yoga Progam    Date: 11/22/2023  Name: Lukasz Person  Clinic Number: [id number]    Therapy Diagnosis:   Encounter Diagnoses   Name Primary?    Physical deconditioning Yes    Stress and adjustment reaction      Physician: Betina Valdovinos, *    Physician Orders: Eval and Treat   Medical Diagnosis from Referral: Chronic low back pain [M54.50, G89.29], Poor posture [R29.3], Esophageal adenocarcinoma [C15.9]  Evaluation Date: 8/22/2023  Authorization Period Expiration: 12/31/23  Plan of Care Expiration: 01/22/24  Progress Note Due: 12/1/23  Visit # / Visits authorized: 3/20     Time in:     8:45 am  Time Out:   9:30 am  Total Billable Time: 45 minutes    SUBJECTIVE     Pt reports: "My left leg is feeling better.  He was compliant with home exercise program given last session.   Response to previous treatment: good  Functional change:  pracrticing deeper breathing for relaxation/immune health    Pain:  2/10 in left shoulder due to fall      OBJECTIVE     Objective Measures updated at progress report unless specified.    Patient Specific Functional Scale:           Activity 8/22/23 11/1/23          Poor balance 5    5         2.  Carrying 20 pounds or more 7     6         3.  Challenge with stairs 5     6         4.  Endurance  4      6         5.               6.             SCORE 5.25    5.75            Total Score = Sum of activity scores / number of activities  Minimum Detectable Change (90% CII) for average score = 2 points  Minimum Detectable Change (90% CI) for single activity score = 3 points       Treatment     Lukasz received the treatments listed below:       Date 11/1/23 11/10/23 11/22/23      Therapeutic Yoga Exercises / Neuromuscular Re-education 30 minutes  30 minutes 30 minutes  minutes  minutes  minutes   Seated Yoga   chair yoga:  Cat-Cow,forward bend, back bend, twist,  chair yoga:  Cat-Cow,forward bend, " back bend, twist, figure 4 chair yoga:  Cat-Cow,forward bend, back bend, twist, figure 4      Quadruped         Supine Twist x 2, knees to chest, Chair pose with block 2 x 5, Twist x 2,( eagle and wide feet),  knees to chest, happy baby flow, knee to chest flow, figure 4, bound angle Chair pose with block 2 x 5, Twist x 2,( eagle and wide feet),  knees to chest, happy baby flow, knee to chest flow, figure 4, bound angle      Prone         standing Warrior 2, chair pose/vilcano x3,   Warrior 2, chair pose/vilcano x3,                         Self-Care/Home Management  / Therapeutic Activities 15 minutes  15 minutes  15 minutes  minutes  minutes  minutes            Relaxation techniques DB, body scan,         Restorative  Supine with legs on chair Supine with legs on chair and bolster under hips- this caused reflux reaction Supine with legs on chair and bolster under hips- this caused reflux reaction      Activity Pacing                           Stress Management/Education  - physiology of yoga/meditation and immune health - physiology of yoga/meditation and immune health  physiology of yoga/meditation and immune health                   Patient Education and Home Exercises      Education provided:   - - physiology of yoga/meditation and immune health  - Progress towards goals     Written Home Exercises Provided: yes.  Exercises were reviewed and Lukasz was able to demonstrate them prior to the end of the session.  Lukasz demonstrated good  understanding of the HEP provided. See EMR under Patient Instructions for exercises provided during therapy sessions.       Assessment      In today's session, Lukasz reviewed his HEP.  This focused on strengthening, stretching and relaxation techniques.   He required maximum assist for verbal and neuromuscular cues. He was able to tolerate supported inversion with bolster under hips and calves without reflux.    Lukasz is progressing well towards his goals and patient  prognosis is Good. Patient will continue to benefit from skilled outpatient occupational therapy to address the deficits listed in the problem list on initial evaluation provide pt/family education and to maximize pt's level of independence in the home and community environment. Pt's spiritual, cultural and educational needs considered and pt agreeable to plan of care and goals.    Anticipated barriers to occupational therapy: none    Goals:  Short Term Goals: 6 weeks      Goal # Goal Status   1 Patient will demonstrate independence with diaphragmatic breathing in sit and supine. met   2 Pt. will identify resource for audio body scan to assist with stress management/immune health progressing   3 Patient will identify 2 new stress coping skills for stress management/immune health. Progressing   4 Patient will identify activity pacing problems.  Patient will then implement new plan for daily activity to increase endurance for ADL's. Progressing      Long Term Goals: 12 weeks      Goal # Goal Status   1 Patient will demonstrate independence with yoga Home Exercise Program to increase strength and endurance for ADL's. Progressing   2 Patient will demonstrate independence with relaxation techniques to manage stress for immune health. Progressing   3 Patient will verbalize good understanding of stress/immune health relationship.  Progressing   4            PLAN     Plan of care Certification: 11/22/2023 to 12/31/23.    Outpatient Occupational Therapy 1 times weekly for 1 weeks to include the following interventions: Neuromuscular Re-ed, Patient Education, Self Care, Therapeutic Activities, and Therapeutic Exercise.     Joanna Kimball OT

## 2023-11-27 ENCOUNTER — HOSPITAL ENCOUNTER (OUTPATIENT)
Dept: RADIOLOGY | Facility: HOSPITAL | Age: 68
Discharge: HOME | End: 2023-11-27
Attending: UROLOGY
Payer: MEDICARE

## 2023-11-27 DIAGNOSIS — N20.0 KIDNEY STONES: ICD-10-CM

## 2023-11-27 PROCEDURE — 76770 US EXAM ABDO BACK WALL COMP: CPT | Mod: TC

## 2023-11-27 PROCEDURE — 74018 XR ABDOMEN AP 1 VIEW: ICD-10-PCS | Mod: 26,,, | Performed by: RADIOLOGY

## 2023-11-27 PROCEDURE — 76770 US EXAM ABDO BACK WALL COMP: CPT | Mod: 26,,, | Performed by: STUDENT IN AN ORGANIZED HEALTH CARE EDUCATION/TRAINING PROGRAM

## 2023-11-27 PROCEDURE — 74018 RADEX ABDOMEN 1 VIEW: CPT | Mod: 26,,, | Performed by: RADIOLOGY

## 2023-11-27 PROCEDURE — 76770 US RETROPERITONEAL COMPLETE: ICD-10-PCS | Mod: 26,,, | Performed by: STUDENT IN AN ORGANIZED HEALTH CARE EDUCATION/TRAINING PROGRAM

## 2023-11-27 PROCEDURE — 74018 RADEX ABDOMEN 1 VIEW: CPT | Mod: TC

## 2023-11-28 ENCOUNTER — OFFICE VISIT (OUTPATIENT)
Dept: UROLOGY | Facility: CLINIC | Age: 68
End: 2023-11-28
Payer: MEDICARE

## 2023-11-28 VITALS
HEIGHT: 72 IN | DIASTOLIC BLOOD PRESSURE: 63 MMHG | HEART RATE: 49 BPM | WEIGHT: 191.81 LBS | BODY MASS INDEX: 25.98 KG/M2 | SYSTOLIC BLOOD PRESSURE: 127 MMHG

## 2023-11-28 DIAGNOSIS — I10 ESSENTIAL HYPERTENSION: Chronic | ICD-10-CM

## 2023-11-28 DIAGNOSIS — N20.0 NEPHROLITHIASIS: Primary | ICD-10-CM

## 2023-11-28 DIAGNOSIS — N28.89 RENAL MASS: ICD-10-CM

## 2023-11-28 PROCEDURE — 99214 PR OFFICE/OUTPT VISIT, EST, LEVL IV, 30-39 MIN: ICD-10-PCS | Mod: S$PBB,,, | Performed by: UROLOGY

## 2023-11-28 PROCEDURE — 99999 PR PBB SHADOW E&M-EST. PATIENT-LVL III: CPT | Mod: PBBFAC,,, | Performed by: UROLOGY

## 2023-11-28 PROCEDURE — 99999 PR PBB SHADOW E&M-EST. PATIENT-LVL III: ICD-10-PCS | Mod: PBBFAC,,, | Performed by: UROLOGY

## 2023-11-28 PROCEDURE — 99213 OFFICE O/P EST LOW 20 MIN: CPT | Mod: PBBFAC | Performed by: UROLOGY

## 2023-11-28 PROCEDURE — 99214 OFFICE O/P EST MOD 30 MIN: CPT | Mod: S$PBB,,, | Performed by: UROLOGY

## 2023-11-28 NOTE — PROGRESS NOTES
"Subjective:      Patient ID: Lukasz Person is a 68 y.o. male.    Chief Complaint: f/u kidney stones    Patient is a 68 y.o. male who is established to our clinic and was initially referred by their PCP, Dr. Wilson for evaluation of kidney stones.     HPI  Patient underwent staged ureteroscopy for left distal ureteral stone and renal stone on 2/17/22 and 2/25/22.  Complicated by pyelonephritis post-op.  Now doing well.  Here to review imaging.   Ultrasound was independently reviewed today and reveals a small, 3mm non-obstructing right renal stone, no hydronephrosis.   KUB was independently reviewed today and reveals punctate right renal stone.     Of note, patient has a h/o hyperparathyroidism s/p parathyroidectomy approximately 10 years ago---in [place].     CT scan of the c/a/p from 10/13/23 was independently reviewed today and reveals left "cortical lesion" with fat component---not concerning for RCC; no clinically significant appearing kidney stones.   Ultrasound of the kidneys from 11/27/23 was independently reviewed today and reveals nonobstructing renal stones, no hydronephrosis, stable exophytic lesion on the left---1 cm.   KUB from 10/27/23 was independently reviewed today and reveals no kidney stones        Review of Systems  All other systems reviewed and negative except pertinent positives noted in HPI.    Objective:     Physical Exam  Constitutional:       General: He is not in acute distress.     Appearance: He is well-developed.   HENT:      Head: Normocephalic and atraumatic.   Eyes:      General: No scleral icterus.  Neck:      Trachea: No tracheal deviation.   Pulmonary:      Effort: Pulmonary effort is normal. No respiratory distress.   Neurological:      Mental Status: He is alert and oriented to person, place, and time.   Psychiatric:         Behavior: Behavior normal.         Thought Content: Thought content normal.         Judgment: Judgment normal.       Assessment:     1. Nephrolithiasis    2. " Essential hypertension    3. Renal mass        Plan:     1. Nephrolithiasis    2. Essential hypertension    3. Renal mass          No orders of the defined types were placed in this encounter.      1. Kidney stone:  -General risk factors for kidney stones and the conservative measures to prevent kidney stones in the future were discussed with the patient in detail.  The patient was encouraged to drink 2-3 liters of water a day, limit iced tea and pasquale as well as foods high in oxalate.  They were cautioned to try to limit salt and red meat intake.  We also discussed adding citrate to the diet with the addition of atul or lemon juice to their water or alternatively with crystal light.   -Imaging review: as above.    -24 hour urine: 6/2022:  24 hour collection date: 6/17/22     Urine volume:  1.8L --Slightly low  (you need to increase your fluid intake, goal urine volume >2.5 liters/day)     PH:  5.9     Urinary Calcium:  140--normal      Urinary Oxalate:  77--quite high  (recommend dietary oxalate restriction)     Urinary citrate:  664--normal          Urinary uric acid: 0.631-- normal        Urinary sodium:  168--normal         2. Renal mass.   -benign appearing on recent imaging.   Will continue to monitor.  -f/u 1 year with a renal US    3. HTN:  --BP reviewed  -stable, continue meds and f/u with PCP

## 2023-11-29 ENCOUNTER — CLINICAL SUPPORT (OUTPATIENT)
Dept: REHABILITATION | Facility: HOSPITAL | Age: 68
End: 2023-11-29
Payer: MEDICARE

## 2023-11-29 ENCOUNTER — PATIENT MESSAGE (OUTPATIENT)
Dept: NEPHROLOGY | Facility: CLINIC | Age: 68
End: 2023-11-29
Payer: MEDICARE

## 2023-11-29 DIAGNOSIS — R53.81 PHYSICAL DECONDITIONING: Primary | ICD-10-CM

## 2023-11-29 DIAGNOSIS — F43.29 STRESS AND ADJUSTMENT REACTION: ICD-10-CM

## 2023-11-29 PROCEDURE — 97112 NEUROMUSCULAR REEDUCATION: CPT

## 2023-11-29 PROCEDURE — 97535 SELF CARE MNGMENT TRAINING: CPT

## 2023-11-29 PROCEDURE — 97110 THERAPEUTIC EXERCISES: CPT

## 2023-11-29 NOTE — PROGRESS NOTES
[facility]  Occupational Therapy Treatment Note - Therapeutic Yoga Progam    Date: 11/29/2023  Name: Lukasz Person  Clinic Number: [id number]    Therapy Diagnosis:   No diagnosis found.    Physician: Betina Valdovinos, *    Physician Orders: Eval and Treat   Medical Diagnosis from Referral: Chronic low back pain [M54.50, G89.29], Poor posture [R29.3], Esophageal adenocarcinoma [C15.9]  Evaluation Date: 8/22/2023  Authorization Period Expiration: 12/31/23  Plan of Care Expiration: 01/22/24  Progress Note Due: 12/1/23  Visit # / Visits authorized: 5/20     Time in:     8:45 am  Time Out:   9:30 am  Total Billable Time: 45 minutes    SUBJECTIVE     Pt reports: I have a sore throat so I am wearing a mask.  He was compliant with home exercise program given last session.   Response to previous treatment: good  Functional change:  pracrticing deeper breathing for relaxation/immune health    Pain:  2/10 in left shoulder due to fall      OBJECTIVE     Objective Measures updated at progress report unless specified.    Patient Specific Functional Scale:           Activity 8/22/23 11/1/23          Poor balance 5    5         2.  Carrying 20 pounds or more 7     6         3.  Challenge with stairs 5     6         4.  Endurance  4      6         5.               6.             SCORE 5.25    5.75            Total Score = Sum of activity scores / number of activities  Minimum Detectable Change (90% CII) for average score = 2 points  Minimum Detectable Change (90% CI) for single activity score = 3 points       Treatment     Lukasz received the treatments listed below:       Date 11/1/23 11/10/23 11/22/23 11/29/23     Therapeutic Yoga Exercises / Neuromuscular Re-education 30 minutes  30 minutes 30 minutes  30 minutes  minutes  minutes   Seated Yoga   chair yoga:  Cat-Cow,forward bend, back bend, twist,  chair yoga:  Cat-Cow,forward bend, back bend, twist, figure 4 chair yoga:  Cat-Cow,forward bend,  back bend, twist, figure 4      Quadruped         Supine Twist x 2, knees to chest, Chair pose with block 2 x 5, Twist x 2,( eagle and wide feet),  knees to chest, happy baby flow, knee to chest flow, figure 4, bound angle Chair pose with block 2 x 5, Twist x 2,( eagle and wide feet),  knees to chest, happy baby flow, knee to chest flow, figure 4, bound angle Chair pose with block 3 x 5, Twist x 2,( eagle and wide feet),  knees to chest, happy baby flow, knee to chest flow, figure 4, bound angle, bridge x 1 with block, ,,    Prone         standing Warrior 2, chair pose/vilcano x3,   Warrior 2, chair pose/vilcano x3,  Warrior 2, triangle with chair and block, chair pose/vilcano x3, wide straddle with hands on chair,                        Self-Care/Home Management  / Therapeutic Activities 15 minutes  15 minutes  15 minutes  15 minutes  minutes  minutes            Relaxation techniques DB, body scan,    DB,body scan,     Restorative  Supine with legs on chair Supine with legs on chair and bolster under hips- this caused reflux reaction Supine with legs on chair and bolster under hips- this caused reflux reaction Supine bridge and fish with blocks,     Activity Pacing                           Stress Management/Education  - physiology of yoga/meditation and immune health - physiology of yoga/meditation and immune health  physiology of yoga/meditation and immune health physiology of yoga/meditation and immune health                  Patient Education and Home Exercises      Education provided:   - - physiology of yoga/meditation and immune health  - Progress towards goals     Written Home Exercises Provided: yes.  Exercises were reviewed and Lukasz was able to demonstrate them prior to the end of the session.  Lukasz demonstrated good  understanding of the HEP provided. See EMR under Patient Instructions for exercises provided during therapy sessions.       Assessment      In today's session, Lukasz reviewed his  HEP.  This focused on strengthening, stretching and relaxation techniques.   He required maximum assist for verbal and neuromuscular cues. He was able to tolerate restorative bridge and fish using blocks.  Lukasz is progressing well towards his goals and patient prognosis is Good.    Progress Update: Patient will continue to benefit from skilled outpatient occupational therapy to address the deficits listed in the problem list on initial evaluation provide pt/family education and to maximize pt's level of independence in the home and community environment. Pt's spiritual, cultural and educational needs considered and pt agreeable to plan of care and goals.    Anticipated barriers to occupational therapy: none    Goals:  Short Term Goals: 6 weeks      Goal # Goal Status   1 Patient will demonstrate independence with diaphragmatic breathing in sit and supine. met   2 Pt. will identify resource for audio body scan to assist with stress management/immune health progressing   3 Patient will identify 2 new stress coping skills for stress management/immune health. Progressing   4 Patient will identify activity pacing problems.  Patient will then implement new plan for daily activity to increase endurance for ADL's. Progressing      Long Term Goals: 12 weeks      Goal # Goal Status   1 Patient will demonstrate independence with yoga Home Exercise Program to increase strength and endurance for ADL's. Progressing   2 Patient will demonstrate independence with relaxation techniques to manage stress for immune health. Progressing   3 Patient will verbalize good understanding of stress/immune health relationship.  Progressing   4            PLAN     Plan of care Certification: 11/29/2023 to 12/31/23.    Outpatient Occupational Therapy 1 times weekly for 1 weeks to include the following interventions: Neuromuscular Re-ed, Patient Education, Self Care, Therapeutic Activities, and Therapeutic Exercise.     Joanna Kimball,  OT

## 2023-11-30 ENCOUNTER — PATIENT MESSAGE (OUTPATIENT)
Dept: INTERNAL MEDICINE | Facility: CLINIC | Age: 68
End: 2023-11-30
Payer: MEDICARE

## 2023-11-30 RX ORDER — ALLOPURINOL 100 MG/1
TABLET ORAL
Qty: 30 TABLET | Refills: 10 | Status: SHIPPED | OUTPATIENT
Start: 2023-11-30

## 2023-11-30 NOTE — TELEPHONE ENCOUNTER
No care due was identified.  [facility] Embedded Care Due Messages. Reference number: [id number].   11/30/2023 9:54:15 AM CST

## 2023-12-04 ENCOUNTER — PATIENT MESSAGE (OUTPATIENT)
Dept: HEMATOLOGY/ONCOLOGY | Facility: CLINIC | Age: 68
End: 2023-12-04
Payer: MEDICARE

## 2023-12-04 ENCOUNTER — PATIENT MESSAGE (OUTPATIENT)
Dept: INTERNAL MEDICINE | Facility: CLINIC | Age: 68
End: 2023-12-04
Payer: MEDICARE

## 2023-12-04 ENCOUNTER — OFFICE VISIT (OUTPATIENT)
Dept: URGENT CARE | Facility: CLINIC | Age: 68
End: 2023-12-04
Payer: MEDICARE

## 2023-12-04 VITALS
WEIGHT: 191 LBS | RESPIRATION RATE: 16 BRPM | BODY MASS INDEX: 25.87 KG/M2 | HEART RATE: 56 BPM | OXYGEN SATURATION: 96 % | TEMPERATURE: 99 F | HEIGHT: 72 IN | SYSTOLIC BLOOD PRESSURE: 126 MMHG | DIASTOLIC BLOOD PRESSURE: 62 MMHG

## 2023-12-04 DIAGNOSIS — R05.9 COUGH, UNSPECIFIED TYPE: ICD-10-CM

## 2023-12-04 DIAGNOSIS — J31.0 RHINITIS, UNSPECIFIED TYPE: ICD-10-CM

## 2023-12-04 DIAGNOSIS — Z20.818 STREPTOCOCCUS EXPOSURE: ICD-10-CM

## 2023-12-04 DIAGNOSIS — J02.9 SORE THROAT: ICD-10-CM

## 2023-12-04 DIAGNOSIS — J06.9 UPPER RESPIRATORY TRACT INFECTION, UNSPECIFIED TYPE: Primary | ICD-10-CM

## 2023-12-04 LAB
CTP QC/QA: YES
CTP QC/QA: YES
MOLECULAR STREP A: NEGATIVE
SARS-COV-2 AG RESP QL IA.RAPID: NEGATIVE

## 2023-12-04 PROCEDURE — 99213 OFFICE O/P EST LOW 20 MIN: CPT | Mod: S$GLB,,, | Performed by: FAMILY MEDICINE

## 2023-12-04 PROCEDURE — 87651 POCT STREP A MOLECULAR: ICD-10-PCS | Mod: QW,S$GLB,, | Performed by: FAMILY MEDICINE

## 2023-12-04 PROCEDURE — 99213 PR OFFICE/OUTPT VISIT, EST, LEVL III, 20-29 MIN: ICD-10-PCS | Mod: S$GLB,,, | Performed by: FAMILY MEDICINE

## 2023-12-04 PROCEDURE — 87811 SARS CORONAVIRUS 2 ANTIGEN POCT, MANUAL READ: ICD-10-PCS | Mod: QW,S$GLB,, | Performed by: FAMILY MEDICINE

## 2023-12-04 PROCEDURE — 87811 SARS-COV-2 COVID19 W/OPTIC: CPT | Mod: QW,S$GLB,, | Performed by: FAMILY MEDICINE

## 2023-12-04 PROCEDURE — 87651 STREP A DNA AMP PROBE: CPT | Mod: QW,S$GLB,, | Performed by: FAMILY MEDICINE

## 2023-12-04 RX ORDER — BENZONATATE 200 MG/1
200 CAPSULE ORAL 3 TIMES DAILY PRN
Qty: 30 CAPSULE | Refills: 0 | Status: SHIPPED | OUTPATIENT
Start: 2023-12-04 | End: 2023-12-14

## 2023-12-04 RX ORDER — IPRATROPIUM BROMIDE 21 UG/1
2 SPRAY, METERED NASAL 2 TIMES DAILY PRN
Qty: 30 ML | Refills: 0 | Status: SHIPPED | OUTPATIENT
Start: 2023-12-04 | End: 2024-01-29

## 2023-12-04 NOTE — PROGRESS NOTES
Subjective:      Patient ID: Lukasz Person is a 68 y.o. male.    Vitals:  vitals were not taken for this visit.     Chief Complaint: Cough    This is a 68 y.o. male who presents today with a chief complaint of URI. Patient has had symptoms for about a week.  Patient feels like congestion is moving from his head to his chest. Cough is productive.      URI   This is a new problem. The current episode started 1 to 4 weeks ago. The problem has been unchanged. There has been no fever. Associated symptoms include congestion and a sore throat. He has tried nothing for the symptoms. The treatment provided no relief.     HENT:  Positive for congestion and sore throat.     Objective:     Physical Exam   Constitutional: He is oriented to person, place, and time.  Non-toxic appearance. No distress. normal  HENT:   Head: Normocephalic and atraumatic.   Ears:   Right Ear: Tympanic membrane, external ear and ear canal normal.   Left Ear: Tympanic membrane, external ear and ear canal normal.   Nose: Rhinorrhea and congestion present.   Mouth/Throat: Mucous membranes are moist. Oropharynx is clear.   Eyes: Conjunctivae are normal. Pupils are equal, round, and reactive to light. Extraocular movement intact   Neck: Neck supple. No neck rigidity present.   Cardiovascular: Regular rhythm and normal heart sounds. Tachycardia present.   No murmur heard.  Pulmonary/Chest: Effort normal and breath sounds normal. No respiratory distress.   Abdominal: Normal appearance and bowel sounds are normal. Soft. flat abdomen   Musculoskeletal: Normal range of motion.         General: Normal range of motion.   Neurological: no focal deficit. He is alert and oriented to person, place, and time.   Skin: Skin is warm and dry. Capillary refill takes less than 2 seconds. jaundice  Psychiatric: His behavior is normal. Mood, judgment and thought content normal.   Nursing note and vitals reviewed.    Assessment:     Plan:   1. Upper respiratory tract infection,  unspecified type  - SARS Coronavirus 2 Antigen, POCT Manual Read    2. Streptococcus exposure  - POCT Strep A, Molecular    3. Sore throat  - POCT Strep A, Molecular    4. Rhinitis, unspecified type  - ipratropium (ATROVENT) 21 mcg (0.03 %) nasal spray; 2 sprays by Each Nostril route 2 (two) times daily as needed.  Dispense: 30 mL; Refill: 0    5. Cough, unspecified type  - benzonatate (TESSALON) 200 MG capsule; Take 1 capsule (200 mg total) by mouth 3 (three) times daily as needed.  Dispense: 30 capsule; Refill: 0   All results discussed with pt prior to discharge from clinic

## 2023-12-05 ENCOUNTER — OFFICE VISIT (OUTPATIENT)
Dept: PODIATRY | Facility: CLINIC | Age: 68
End: 2023-12-05
Payer: MEDICARE

## 2023-12-05 VITALS
WEIGHT: 188.69 LBS | SYSTOLIC BLOOD PRESSURE: 137 MMHG | DIASTOLIC BLOOD PRESSURE: 78 MMHG | BODY MASS INDEX: 25.56 KG/M2 | HEIGHT: 72 IN | HEART RATE: 51 BPM

## 2023-12-05 DIAGNOSIS — B35.1 ONYCHOMYCOSIS DUE TO DERMATOPHYTE: ICD-10-CM

## 2023-12-05 DIAGNOSIS — R60.0 BILATERAL LOWER EXTREMITY EDEMA: ICD-10-CM

## 2023-12-05 DIAGNOSIS — E11.42 DIABETIC POLYNEUROPATHY ASSOCIATED WITH TYPE 2 DIABETES MELLITUS: Primary | ICD-10-CM

## 2023-12-05 DIAGNOSIS — D84.81 IMMUNODEFICIENCY SECONDARY TO NEOPLASM: ICD-10-CM

## 2023-12-05 DIAGNOSIS — D49.9 IMMUNODEFICIENCY SECONDARY TO NEOPLASM: ICD-10-CM

## 2023-12-05 PROCEDURE — 11721 DEBRIDE NAIL 6 OR MORE: CPT | Mod: PBBFAC,PN | Performed by: PODIATRIST

## 2023-12-05 PROCEDURE — 11721 DEBRIDE NAIL 6 OR MORE: CPT | Mod: Q9,S$PBB,, | Performed by: PODIATRIST

## 2023-12-05 PROCEDURE — 99499 UNLISTED E&M SERVICE: CPT | Mod: S$PBB,,, | Performed by: PODIATRIST

## 2023-12-05 PROCEDURE — 99999 PR PBB SHADOW E&M-EST. PATIENT-LVL III: ICD-10-PCS | Mod: PBBFAC,,, | Performed by: PODIATRIST

## 2023-12-05 PROCEDURE — 99999 PR PBB SHADOW E&M-EST. PATIENT-LVL III: CPT | Mod: PBBFAC,,, | Performed by: PODIATRIST

## 2023-12-05 PROCEDURE — 99499 NO LOS: ICD-10-PCS | Mod: S$PBB,,, | Performed by: PODIATRIST

## 2023-12-05 PROCEDURE — 99213 OFFICE O/P EST LOW 20 MIN: CPT | Mod: PBBFAC,59,PN | Performed by: PODIATRIST

## 2023-12-05 PROCEDURE — 11721 PR DEBRIDEMENT OF NAILS, 6 OR MORE: ICD-10-PCS | Mod: Q9,S$PBB,, | Performed by: PODIATRIST

## 2023-12-05 NOTE — PROGRESS NOTES
Subjective:      Patient ID: Lukasz Person is a 68 y.o. male.    Chief Complaint:   Diabetic Foot Exam (11/7/2023 - Kirk Wilson MD, PCP) and Routine Foot Care    Lukasz is a 68 y.o. male who returns to the clinic or evaluation and treatment of diabetic feet. Lukasz has a past medical history of Anticoagulant long-term use, Cancer, Diabetes mellitus, Hyperlipidemia, Hypertension, Kidney stones, and Sleep apnea.     Patient doing well  Relates his feet are in his numb is normal  No new foot complaints except long nails catching on the blankets    PCP: Kirk Wilson MD    Date Last Seen by PCP: 11/7/23    Current shoe gear: Tennis shoes    Hemoglobin A1C   Date Value Ref Range Status   05/29/2023 6.2 (H) 4.0 - 5.6 % Final     Comment:     ADA Screening Guidelines:  5.7-6.4%  Consistent with prediabetes  >or=6.5%  Consistent with diabetes    High levels of fetal hemoglobin interfere with the HbA1C  assay. Heterozygous hemoglobin variants (HbS, HgC, etc)do  not significantly interfere with this assay.   However, presence of multiple variants may affect accuracy.     03/14/2023 6.6 (H) 4.0 - 5.6 % Final     Comment:     ADA Screening Guidelines:  5.7-6.4%  Consistent with prediabetes  >or=6.5%  Consistent with diabetes    High levels of fetal hemoglobin interfere with the HbA1C  assay. Heterozygous hemoglobin variants (HbS, HgC, etc)do  not significantly interfere with this assay.   However, presence of multiple variants may affect accuracy.     11/07/2022 6.9 (H) 4.0 - 5.6 % Final     Comment:     ADA Screening Guidelines:  5.7-6.4%  Consistent with prediabetes  >or=6.5%  Consistent with diabetes    High levels of fetal hemoglobin interfere with the HbA1C  assay. Heterozygous hemoglobin variants (HbS, HgC, etc)do  not significantly interfere with this assay.   However, presence of multiple variants may affect accuracy.              Past Medical History:   Diagnosis Date    Anticoagulant long-term use     Cancer     Diabetes  mellitus     Onset late 50s/early 60s    Hyperlipidemia     Hypertension     Onset late 50s/early 60s    Kidney stones     Sleep apnea     since 2006     Past Surgical History:   Procedure Laterality Date    COLONOSCOPY N/A 10/26/2023    Procedure: COLONOSCOPY;  Surgeon: Noah Duong MD;  Location: [facility] (4TH FLR);  Service: Endoscopy;  Laterality: N/A;  instructions sent to patient portal. Tb  referral: Dr. Wilson  Pt. on Xarelto--tb-ok to hold see te 8/15/23 dr vu  10/13-precall complete-MS  10/19 pt rescheduled, Xarelto hold, PEG, portal -ml  10/24-precall complete-KPvt    CORONARY ANGIOGRAPHY N/A 9/30/2020    Procedure: ANGIOGRAM, CORONARY ARTERY;  Surgeon: John West MD;  Location: [facility] CATH LAB;  Service: Cardiology;  Laterality: N/A;    CYSTOSCOPY N/A 2/17/2022    Procedure: CYSTOSCOPY;  Surgeon: Lukasz Hughes MD;  Location: [facility] OR 1ST FLR;  Service: Urology;  Laterality: N/A;    CYSTOSCOPY W/ URETERAL STENT PLACEMENT N/A 2/25/2022    Procedure: CYSTOSCOPY, WITH URETERAL STENT INSERTION;  Surgeon: Lukasz Hughes MD;  [facility];  Service: Urology;  Laterality: N/A;    ENDOSCOPIC ULTRASOUND OF UPPER GASTROINTESTINAL TRACT N/A 12/7/2022    Procedure: ULTRASOUND, UPPER GI TRACT, ENDOSCOPIC;  Surgeon: Darian Main MD;  Location: [facility];  Service: Endoscopy;  Laterality: N/A;  Approval to hold Xarelto rec'd from Dr. Vu (see t/e 12/5/22)-DS    ESOPHAGOGASTRODUODENOSCOPY N/A 11/17/2022    Procedure: EGD (ESOPHAGOGASTRODUODENOSCOPY);  Surgeon: Brody Gonzales MD;  Location: [facility] (2ND FLR);  Service: Endoscopy;  Laterality: N/A;  inst via email-ok to hold Xarelto x 2 days-MS    ESOPHAGOGASTRODUODENOSCOPY N/A 5/31/2023    Procedure: EGD (ESOPHAGOGASTRODUODENOSCOPY) WITH DILATION;  Surgeon: Santana Brown Jr., MD;  Location: [facility] OR [address] [place] [address];  Service: General;  Laterality: N/A;    LASER LITHOTRIPSY Left 2/25/2022    Procedure: LITHOTRIPSY, USING  LASER;  Surgeon: Lukasz Hughes MD;  [address];  Service: Urology;  Laterality: Left;    LEFT HEART CATHETERIZATION Left 9/30/2020    Procedure: Left heart cath;  Surgeon: John West MD;  Location: [facility] CATH LAB;  Service: Cardiology;  Laterality: Left;    LITHOTRIPSY      PARATHYROIDECTOMY  1/1/2-107    PYELOSCOPY Left 2/25/2022    Procedure: PYELOSCOPY;  Surgeon: Lukasz Hughes MD;  Location: [address];  Service: Urology;  Laterality: Left;    ROBOT-ASSISTED SURGICAL REMOVAL OF ESOPHAGUS USING DA CALROS XI N/A 3/14/2023    Procedure: XI ROBOTIC ESOPHAGECTOMY;  Surgeon: Santana Brown Jr., MD;  Location: [address];  Service: General;  Laterality: N/A;  Abdomen, Chest and Neck    ROBOTIC JEJUNOSTOMY N/A 3/14/2023    Procedure: ROBOTIC JEJUNOSTOMY TUBE INSERTION;  Surgeon: Santana Brown Jr., MD;  Location: [address];  Service: General;  Laterality: N/A;    TRANSESOPHAGEAL ECHOCARDIOGRAPHY N/A 4/7/2022    Procedure: ECHOCARDIOGRAM, TRANSESOPHAGEAL;  Surgeon: Xander Diagnostic Provider;  Location: [facility] EP LAB;  Service: Cardiology;  Laterality: N/A;    TREATMENT OF CARDIAC ARRHYTHMIA N/A 4/7/2022    Procedure: Cardioversion or Defibrillation;  Surgeon: Iris Fisher NP;  Location: [facility] EP LAB;  Service: Cardiology;  Laterality: N/A;  afib, dccv, artie, anes, EH, 3prep    URETEROSCOPIC REMOVAL OF URETERIC CALCULUS Left 2/25/2022    Procedure: REMOVAL, CALCULUS, URETER, URETEROSCOPIC;  Surgeon: Lukasz Hughes MD;  Location: [address];  Service: Urology;  Laterality: Left;    URETEROSCOPY Left 2/25/2022    Procedure: URETEROSCOPY;  Surgeon: Lukasz Hughes MD;  Location: [address];  Service: Urology;  Laterality: Left;    VOCAL CORD INJECTION Left 3/17/2023    Procedure: INJECTION, VOCAL CORD, LARYNGOSCOPIC;  Surgeon: Gm Altman MD;  Location: [address];  Service: ENT;  Laterality: Left;     Current Outpatient Medications on File  Prior to Visit   Medication Sig Dispense Refill    allopurinoL (ZYLOPRIM) 100 MG tablet TAKE 1 TABLET BY MOUTH EVERY DAY 30 tablet 10    benzonatate (TESSALON) 200 MG capsule Take 1 capsule (200 mg total) by mouth 3 (three) times daily as needed. 30 capsule 0    blood sugar diagnostic (ACCU-CHEK ANTONELLA PLUS TEST STRP) Strp Use to test CBG four times daily E11.65 400 strip 3    blood-glucose meter kit Checks blood sugars 1x/daily. 1 each 12    citric acid-potassium citrate (POLYCITRA) 1,100-334 mg/5 mL solution Take 10 mLs (20 mEq total) by mouth 2 (two) times a day. 600 mL 5    hydroCHLOROthiazide (HYDRODIURIL) 25 MG tablet Take 1 tablet (25 mg total) by mouth once daily. 30 tablet 11    ipratropium (ATROVENT) 21 mcg (0.03 %) nasal spray 2 sprays by Each Nostril route 2 (two) times daily as needed. 30 mL 0    lancets (ACCU-CHEK SOFTCLIX LANCETS) Misc Use to test CBG 4 times daily E11.65 400 each 1    metoprolol succinate (TOPROL-XL) 25 MG 24 hr tablet Take 0.5 tablets (12.5 mg total) by mouth once daily. 15 tablet 11    nitroGLYCERIN (NITROSTAT) 0.4 MG SL tablet Place 1 tablet (0.4 mg total) under the tongue every 5 (five) minutes as needed for Chest pain. If you need a third tablet, call 911 100 tablet 3    omeprazole (PRILOSEC) 40 MG capsule TAKE 1 CAPSULE(40 MG) BY MOUTH EVERY DAY 90 capsule 3    rivaroxaban (XARELTO) 20 mg Tab Take 1 tablet (20 mg total) by mouth daily with dinner or evening meal. 90 tablet 3    rosuvastatin (CRESTOR) 20 MG tablet Take 1 tablet (20 mg total) by mouth every evening. 90 tablet 3    tamsulosin (FLOMAX) 0.4 mg Cap TAKE 1 CAPSULE(0.4 MG) BY MOUTH EVERY DAY 30 capsule 2    testosterone (ANDROGEL) 20.25 mg/1.25 gram (1.62 %) GlPm Apply 4 pumps to shoulders daily 2 each 5    triamcinolone acetonide 0.1% (KENALOG) 0.1 % cream Apply topically 2 (two) times daily. 45 g 1     No current facility-administered medications on file prior to visit.     Review of patient's allergies indicates:  No  Known Allergies    Review of Systems   Constitutional: Negative for chills, decreased appetite, fever, malaise/fatigue, night sweats, weight gain and weight loss.   Cardiovascular:  Negative for chest pain, claudication, dyspnea on exertion, leg swelling, palpitations and syncope.   Respiratory:  Negative for cough and shortness of breath.    Endocrine: Negative for cold intolerance and heat intolerance.   Hematologic/Lymphatic: Negative for bleeding problem. Does not bruise/bleed easily.   Skin:  Positive for nail changes. Negative for color change, dry skin, flushing, itching, poor wound healing, rash, skin cancer, suspicious lesions and unusual hair distribution.   Musculoskeletal:  Positive for arthritis and stiffness. Negative for back pain, falls, gout, joint pain, joint swelling, muscle cramps, muscle weakness, myalgias and neck pain.   Gastrointestinal:  Negative for diarrhea, nausea and vomiting.   Neurological:  Positive for numbness and paresthesias. Negative for dizziness, focal weakness, light-headedness, tremors, vertigo and weakness.   Psychiatric/Behavioral:  Negative for altered mental status and depression. The patient does not have insomnia.    Allergic/Immunologic: Negative.            Objective:       Vitals:    12/05/23 1032   BP: 137/78   Pulse: (!) 51   Weight: 85.6 kg (188 lb 11.4 oz)   Height: 6' (1.829 m)   PainSc: 0-No pain   PainLoc: Foot   85.6 kg (188 lb 11.4 oz)     Physical Exam  Vitals reviewed.   Constitutional:       General: He is not in acute distress.     Appearance: He is well-developed. He is not ill-appearing, toxic-appearing or diaphoretic.      Comments: Proper supportive shoegear   Cardiovascular:      Pulses:           Dorsalis pedis pulses are 2+ on the right side and 2+ on the left side.        Posterior tibial pulses are 1+ on the right side and 1+ on the left side.   Musculoskeletal:      Right lower leg: No tenderness. 1+ Edema present.      Left lower leg: No  tenderness. 1+ Edema present.      Right foot: Decreased range of motion. Deformity, bunion and prominent metatarsal heads present. No tenderness or bony tenderness.      Left foot: Decreased range of motion. Deformity, bunion, prominent metatarsal heads and tenderness present. No bony tenderness.      Comments: Mild pain with debridement left hallux nail no pain to digit   Feet:      Right foot:      Protective Sensation: 10 sites tested.  6 sites sensed.      Skin integrity: No ulcer, blister, skin breakdown, erythema, warmth, callus, dry skin or fissure.      Toenail Condition: Right toenails are abnormally thick and long.      Left foot:      Protective Sensation: 10 sites tested.  6 sites sensed.      Skin integrity: No ulcer, blister, skin breakdown, erythema, warmth, callus, dry skin or fissure.      Toenail Condition: Left toenails are abnormally thick and long.      Comments:   No open lesions    SWM decreased to digits and forefoot    Nails 1-10 thickened elongated discolored left hallux nail has distal half lysis with mild maceration subungual there is no acute signs of infection.       No fissures noted today      Skin:     General: Skin is warm and dry.      Capillary Refill: Capillary refill takes 2 to 3 seconds.      Coloration: Skin is not pale.      Findings: No erythema or rash.      Nails: There is no clubbing.   Neurological:      Mental Status: He is alert and oriented to person, place, and time.      Gait: Gait abnormal.   Psychiatric:         Attention and Perception: Attention normal.         Mood and Affect: Mood normal.         Speech: Speech normal.         Behavior: Behavior normal.         Thought Content: Thought content normal.         Judgment: Judgment normal.               Assessment:       Encounter Diagnoses   Name Primary?    Diabetic polyneuropathy associated with type 2 diabetes mellitus Yes    Immunodeficiency secondary to neoplasm     Bilateral lower extremity edema      Onychomycosis due to dermatophyte                Plan:       Lukasz was seen today for diabetic foot exam and routine foot care.    Diagnoses and all orders for this visit:    Diabetic polyneuropathy associated with type 2 diabetes mellitus    Immunodeficiency secondary to neoplasm    Bilateral lower extremity edema    Onychomycosis due to dermatophyte            I counseled the patient on his conditions, their implications and medical management.     Feet doing well     - Shoe inspection. Diabetic Foot Education. Patient reminded of the importance of good nutrition and blood sugar control to help prevent podiatric complications of diabetes. Patient instructed on proper foot hygeine. We discussed wearing proper shoe gear, daily foot inspections, never walking without protective shoe gear, never putting sharp instruments to feet      With patient's permission, the toenails mentioned above were aggressively reduced and debrided using a nail nipper, removing all offending nail and debris.       F/u 3 months sooner if need

## 2023-12-06 ENCOUNTER — CLINICAL SUPPORT (OUTPATIENT)
Dept: REHABILITATION | Facility: HOSPITAL | Age: 68
End: 2023-12-06
Payer: MEDICARE

## 2023-12-06 DIAGNOSIS — F43.29 STRESS AND ADJUSTMENT REACTION: ICD-10-CM

## 2023-12-06 DIAGNOSIS — R53.81 PHYSICAL DECONDITIONING: Primary | ICD-10-CM

## 2023-12-06 PROCEDURE — 97110 THERAPEUTIC EXERCISES: CPT

## 2023-12-06 PROCEDURE — 97112 NEUROMUSCULAR REEDUCATION: CPT

## 2023-12-06 PROCEDURE — 97535 SELF CARE MNGMENT TRAINING: CPT

## 2023-12-06 NOTE — PROGRESS NOTES
[facility]  Occupational Therapy Progress and Treatment Note - Therapeutic Yoga Progam    Date: 12/6/2023  Name: Lukasz Person  Clinic Number: [id number]    Therapy Diagnosis:   Encounter Diagnoses   Name Primary?    Physical deconditioning Yes    Stress and adjustment reaction        Physician: Betina Valdovinos, *    Physician Orders: Eval and Treat   Medical Diagnosis from Referral: Chronic low back pain [M54.50, G89.29], Poor posture [R29.3], Esophageal adenocarcinoma [C15.9]  Evaluation Date: 8/22/2023  Authorization Period Expiration: 12/31/23  Plan of Care Expiration: 01/22/24  Progress Note Due: 1/6//23  Visit # / Visits authorized: 6/20     Time in:     8:45 am  Time Out:   9:45 am  Total Billable Time: 60 minutes    SUBJECTIVE     Pt reports: I am feeling good and have done the exercises.  He was compliant with home exercise program given last session.   Response to previous treatment: good  Functional change:  pracrticing deeper breathing for relaxation/immune health    Pain:  2/10 in left shoulder due to fall      OBJECTIVE     Objective Measures updated at progress report unless specified.    Patient Specific Functional Scale:           Activity 8/22/23 11/1/23 12/6/23         Poor balance 5    5  6       2.  Carrying 20 pounds or more 7     6  7       3.  Challenge with stairs 5     6  6       4.  Endurance  4      6  7       5.               6.             SCORE 5.25    5.75    6.5          Total Score = Sum of activity scores / number of activities  Minimum Detectable Change (90% CII) for average score = 2 points  Minimum Detectable Change (90% CI) for single activity score = 3 points       Treatment     Lukasz received the treatments listed below:       Date 11/1/23 11/10/23 11/22/23 11/29/23 12/6/23    Therapeutic Yoga Exercises / Neuromuscular Re-education 30 minutes  30 minutes 30 minutes  30 minutes  45 minutes  PN  minutes   Seated Yoga   chair  yoga:  Cat-Cow,forward bend, back bend, twist,  chair yoga:  Cat-Cow,forward bend, back bend, twist, figure 4 chair yoga:  Cat-Cow,forward bend, back bend, twist, figure 4  chair yoga:  Cat-Cow,forward bend, back bend, twist, figure 4    Quadruped     Camel wih chair in high kneel    Supine Twist x 2, knees to chest, Chair pose with block 2 x 5, Twist x 2,( eagle and wide feet),  knees to chest, happy baby flow, knee to chest flow, figure 4, bound angle Chair pose with block 2 x 5, Twist x 2,( eagle and wide feet),  knees to chest, happy baby flow, knee to chest flow, figure 4, bound angle Chair pose with block 3 x 5, Twist x 2,( eagle and wide feet),  knees to chest, happy baby flow, knee to chest flow, figure 4, bound angle, bridge x 1 with block, Modified boat pose with block 3 x 5, Twist x 2,( eagle and wide feet),  knees to chest, happy baby flow, knee to chest flow, figure 4, rolling on small ball, right QL area), for self manual muscle release-HEP    Prone         standing Warrior 2, chair pose/vilcano x3,   Warrior 2, chair pose/vilcano x3,  Warrior 2, triangle with chair and block, chair pose/vilcano x3, wide straddle with hands on chair,  Warrior 2, chair pose/volcano x3, wide straddle with hands on chair, back extention at wall, twist with wall and chair,                      Self-Care/Home Management  / Therapeutic Activities 15 minutes  15 minutes  15 minutes  15 minutes  15 minutes  minutes            Relaxation techniques DB, body scan,    DB,body scan, DB,body scan, Seated metta  practice    Restorative  Supine with legs on chair Supine with legs on chair and bolster under hips- this caused reflux reaction Supine with legs on chair and bolster under hips- this caused reflux reaction Supine bridge and fish with blocks,     Activity Pacing                           Stress Management/Education  - physiology of yoga/meditation and immune health - physiology of yoga/meditation and immune health   physiology of yoga/meditation and immune health physiology of yoga/meditation and immune health - physiology of yoga/meditation and immune health                 Patient Education and Home Exercises      Education provided:   - - physiology of yoga/meditation and immune health  - Progress towards goals     Written Home Exercises Provided: yes.  Exercises were reviewed and Lukasz was able to demonstrate them prior to the end of the session.  Lukasz demonstrated good  understanding of the HEP provided. See EMR under Patient Instructions for exercises provided during therapy sessions.       Assessment      In today's session, Lukasz reviewed his HEP.  This focused on strengthening, stretching and relaxation techniques.   He required maximum assist for verbal and neuromuscular cues. HE c/o right sided back pain and he was taught to use a massage ball. He plans to order this for his HEP.    Lukasz is progressing well towards his goals and patient prognosis is Good.    Progress Update: Lukasz is making good progress towards his goals.  His PSFS score increased from   5.75 to 6.5 indicating increased functional ability.  He reports his balance and endurance are improving with yoga exercise. He also reports he is using prayer and breathing techniques to help with stress/immune health.   Patient will continue to benefit from skilled outpatient occupational therapy to address the deficits listed in the problem list on initial evaluation provide pt/family education and to maximize pt's level of independence in the home and community environment. Pt's spiritual, cultural and educational needs considered and pt agreeable to plan of care and goals.    Anticipated barriers to occupational therapy: none    Goals:  Short Term Goals: 6 weeks      Goal # Goal Status   1 Patient will demonstrate independence with diaphragmatic breathing in sit and supine. met   2 Pt. will identify resource for audio body scan to assist with stress  management/immune health progressing   3 Patient will identify 2 new stress coping skills for stress management/immune health. met   4 Patient will identify activity pacing problems.  Patient will then implement new plan for daily activity to increase endurance for ADL's. Progressing      Long Term Goals: 12 weeks      Goal # Goal Status   1 Patient will demonstrate independence with yoga Home Exercise Program to increase strength and endurance for ADL's. Progressing   2 Patient will demonstrate independence with relaxation techniques to manage stress for immune health. Progressing   3 Patient will verbalize good understanding of stress/immune health relationship.  Progressing   4            PLAN     Plan of care Certification: 12/6/2023 to 1/22/24.    Outpatient Occupational Therapy 1 times weekly for 14 weeks to include the following interventions: Neuromuscular Re-ed, Patient Education, Self Care, Therapeutic Activities, and Therapeutic Exercise.     Joanna Kimball OT       72

## 2023-12-07 ENCOUNTER — RESEARCH ENCOUNTER (OUTPATIENT)
Dept: RESEARCH | Facility: HOSPITAL | Age: 68
End: 2023-12-07
Payer: MEDICARE

## 2023-12-07 ENCOUNTER — INFUSION (OUTPATIENT)
Dept: INFUSION THERAPY | Facility: HOSPITAL | Age: 68
End: 2023-12-07
Payer: MEDICARE

## 2023-12-07 ENCOUNTER — OFFICE VISIT (OUTPATIENT)
Dept: HEMATOLOGY/ONCOLOGY | Facility: CLINIC | Age: 68
End: 2023-12-07
Payer: MEDICARE

## 2023-12-07 VITALS
DIASTOLIC BLOOD PRESSURE: 68 MMHG | OXYGEN SATURATION: 100 % | RESPIRATION RATE: 18 BRPM | SYSTOLIC BLOOD PRESSURE: 148 MMHG | HEART RATE: 52 BPM | TEMPERATURE: 98 F

## 2023-12-07 VITALS
HEIGHT: 72 IN | RESPIRATION RATE: 18 BRPM | SYSTOLIC BLOOD PRESSURE: 150 MMHG | WEIGHT: 188.38 LBS | OXYGEN SATURATION: 97 % | HEART RATE: 72 BPM | BODY MASS INDEX: 25.52 KG/M2 | DIASTOLIC BLOOD PRESSURE: 67 MMHG

## 2023-12-07 DIAGNOSIS — E11.29 TYPE 2 DIABETES MELLITUS WITH OTHER DIABETIC KIDNEY COMPLICATION, WITHOUT LONG-TERM CURRENT USE OF INSULIN: ICD-10-CM

## 2023-12-07 DIAGNOSIS — I15.2 HYPERTENSION ASSOCIATED WITH DIABETES: ICD-10-CM

## 2023-12-07 DIAGNOSIS — E11.22 TYPE 2 DIABETES MELLITUS WITH STAGE 3 CHRONIC KIDNEY DISEASE, WITHOUT LONG-TERM CURRENT USE OF INSULIN, UNSPECIFIED WHETHER STAGE 3A OR 3B CKD: ICD-10-CM

## 2023-12-07 DIAGNOSIS — E11.69 HYPERLIPIDEMIA ASSOCIATED WITH TYPE 2 DIABETES MELLITUS: ICD-10-CM

## 2023-12-07 DIAGNOSIS — N18.30 TYPE 2 DIABETES MELLITUS WITH STAGE 3 CHRONIC KIDNEY DISEASE, WITHOUT LONG-TERM CURRENT USE OF INSULIN, UNSPECIFIED WHETHER STAGE 3A OR 3B CKD: ICD-10-CM

## 2023-12-07 DIAGNOSIS — R21 RASH: ICD-10-CM

## 2023-12-07 DIAGNOSIS — E78.5 HYPERLIPIDEMIA ASSOCIATED WITH TYPE 2 DIABETES MELLITUS: ICD-10-CM

## 2023-12-07 DIAGNOSIS — E11.59 HYPERTENSION ASSOCIATED WITH DIABETES: ICD-10-CM

## 2023-12-07 DIAGNOSIS — R13.19 ESOPHAGEAL DYSPHAGIA: ICD-10-CM

## 2023-12-07 DIAGNOSIS — C15.9 ESOPHAGEAL ADENOCARCINOMA: Primary | ICD-10-CM

## 2023-12-07 DIAGNOSIS — I48.0 PAROXYSMAL ATRIAL FIBRILLATION: ICD-10-CM

## 2023-12-07 DIAGNOSIS — I50.20 HFREF (HEART FAILURE WITH REDUCED EJECTION FRACTION): ICD-10-CM

## 2023-12-07 DIAGNOSIS — I25.10 CORONARY ARTERY DISEASE INVOLVING NATIVE CORONARY ARTERY OF NATIVE HEART WITHOUT ANGINA PECTORIS: ICD-10-CM

## 2023-12-07 DIAGNOSIS — J38.00 VOCAL CORD PARALYSIS: ICD-10-CM

## 2023-12-07 PROCEDURE — 99215 OFFICE O/P EST HI 40 MIN: CPT | Mod: Q1,S$PBB,, | Performed by: INTERNAL MEDICINE

## 2023-12-07 PROCEDURE — 99214 OFFICE O/P EST MOD 30 MIN: CPT | Mod: PBBFAC | Performed by: INTERNAL MEDICINE

## 2023-12-07 PROCEDURE — 99999 PR PBB SHADOW E&M-EST. PATIENT-LVL IV: CPT | Mod: PBBFAC,,, | Performed by: INTERNAL MEDICINE

## 2023-12-07 PROCEDURE — 99999 PR PBB SHADOW E&M-EST. PATIENT-LVL IV: ICD-10-PCS | Mod: PBBFAC,,, | Performed by: INTERNAL MEDICINE

## 2023-12-07 PROCEDURE — 96413 CHEMO IV INFUSION 1 HR: CPT | Mod: Q1

## 2023-12-07 PROCEDURE — 99215 PR OFFICE/OUTPT VISIT, EST, LEVL V, 40-54 MIN: ICD-10-PCS | Mod: Q1,S$PBB,, | Performed by: INTERNAL MEDICINE

## 2023-12-07 PROCEDURE — A4216 STERILE WATER/SALINE, 10 ML: HCPCS | Mod: Q1 | Performed by: INTERNAL MEDICINE

## 2023-12-07 PROCEDURE — 63600175 PHARM REV CODE 636 W HCPCS: Performed by: INTERNAL MEDICINE

## 2023-12-07 PROCEDURE — 25000003 PHARM REV CODE 250: Mod: Q1 | Performed by: INTERNAL MEDICINE

## 2023-12-07 RX ORDER — HEPARIN 100 UNIT/ML
500 SYRINGE INTRAVENOUS
Status: CANCELLED | OUTPATIENT
Start: 2023-12-11

## 2023-12-07 RX ORDER — DIPHENHYDRAMINE HYDROCHLORIDE 50 MG/ML
50 INJECTION INTRAMUSCULAR; INTRAVENOUS ONCE AS NEEDED
Status: DISCONTINUED | OUTPATIENT
Start: 2023-12-07 | End: 2023-12-07 | Stop reason: HOSPADM

## 2023-12-07 RX ORDER — EPINEPHRINE 0.3 MG/.3ML
0.3 INJECTION SUBCUTANEOUS ONCE AS NEEDED
Status: DISCONTINUED | OUTPATIENT
Start: 2023-12-07 | End: 2023-12-07 | Stop reason: HOSPADM

## 2023-12-07 RX ORDER — SODIUM CHLORIDE 0.9 % (FLUSH) 0.9 %
10 SYRINGE (ML) INJECTION
Status: CANCELLED | OUTPATIENT
Start: 2023-12-11

## 2023-12-07 RX ORDER — EPINEPHRINE 0.3 MG/.3ML
0.3 INJECTION SUBCUTANEOUS ONCE AS NEEDED
Status: CANCELLED | OUTPATIENT
Start: 2023-12-11

## 2023-12-07 RX ORDER — DIPHENHYDRAMINE HYDROCHLORIDE 50 MG/ML
50 INJECTION INTRAMUSCULAR; INTRAVENOUS ONCE AS NEEDED
Status: CANCELLED | OUTPATIENT
Start: 2023-12-11

## 2023-12-07 RX ORDER — SODIUM CHLORIDE 0.9 % (FLUSH) 0.9 %
10 SYRINGE (ML) INJECTION
Status: DISCONTINUED | OUTPATIENT
Start: 2023-12-07 | End: 2023-12-07 | Stop reason: HOSPADM

## 2023-12-07 RX ORDER — HEPARIN 100 UNIT/ML
500 SYRINGE INTRAVENOUS
Status: DISCONTINUED | OUTPATIENT
Start: 2023-12-07 | End: 2023-12-07 | Stop reason: HOSPADM

## 2023-12-07 RX ADMIN — Medication 10 ML: at 04:12

## 2023-12-07 RX ADMIN — HEPARIN 500 UNITS: 100 SYRINGE at 04:12

## 2023-12-07 RX ADMIN — SODIUM CHLORIDE: 9 INJECTION, SOLUTION INTRAVENOUS at 03:12

## 2023-12-07 NOTE — PROGRESS NOTES
MEDICAL ONCOLOGY - ESTABLISHED PATIENT VISIT    Reason for visit: esophageal cancer    Best Contact Phone Number(s): [phone] (home)      Cancer/Stage/TNM:    Cancer Staging   Esophageal adenocarcinoma  Staging form: Esophagus - Adenocarcinoma, AJCC 8th Edition  - Pathologic stage from 3/28/2023: Stage II (ypT3, pN0, cM0, G2) - Signed by Santana Brown Jr., MD on 3/28/2023       Oncology History   Esophageal adenocarcinoma   12/8/2022 Initial Diagnosis    Esophageal adenocarcinoma     12/21/2022 - 12/21/2022 Chemotherapy    Treatment Summary   Plan Name: OP ESOPHAGEAL PACLITAXEL CARBOPLATIN WEEKLY  Treatment Goal: Curative  Status: Inactive  Start Date:   End Date:   Provider: Miki Medrano MD  Chemotherapy: CARBOplatin (PARAPLATIN) in sodium chloride 0.9% 250 mL chemo infusion, , Intravenous, Clinic/HOD 1 time, 0 of 1 cycle  PACLitaxeL (TAXOL) 50 mg/m2 = 120 mg in sodium chloride 0.9% 250 mL chemo infusion, 50 mg/m2, Intravenous, Clinic/HOD 1 time, 0 of 1 cycle     12/29/2022 - 1/20/2023 Chemotherapy    Treatment Summary   Plan Name: [facility] [id number] ARM B CARBOPLATIN PACLITAXEL NIVOLUMAB  Treatment Goal: Curative  Status: Inactive  Start Date: 12/29/2022  End Date: 1/20/2023  Provider: Miki Medrano MD  Chemotherapy: CARBOplatin (PARAPLATIN) 215 mg in sodium chloride 0.9% 306.5 mL chemo infusion, 215 mg, Intravenous, Clinic/HOD 1 time, 4 of 5 cycles  Administration: 215 mg (12/29/2022), 230 mg (1/5/2023), 265 mg (1/13/2023), 265 mg (1/20/2023)  PACLitaxeL (TAXOL) 50 mg/m2 = 120 mg in sodium chloride 0.9% 250 mL chemo infusion, 50 mg/m2 = 120 mg, Intravenous, Clinic/HOD 1 time, 4 of 5 cycles  Dose modification: 50 mg/m2 (original dose 50 mg/m2, Cycle 4)  Administration: 120 mg (12/29/2022), 120 mg (1/5/2023), 120 mg (1/13/2023), 120 mg (1/20/2023)     12/29/2022 - 2/1/2023 Radiation Therapy    Treating physician: Dr. Javier Moulton    Course: C1 CHEST 2022    Treatment Site Ref. ID Energy  Dose/Fx (Gy) #Fx Dose Correction (Gy) Total Dose (Gy) Start Date End Date Elapsed Days   IM Esophagus [id number] 6X 1.8 23 / 23 0 41.4 12/29/2022 2/1/2023 34        3/17/2023 Surgery    Procedure: Procedure(s) (LRB):  XI ROBOTIC ESOPHAGECTOMY (N/A)  ROBOTIC JEJUNOSTOMY TUBE INSERTION (N/A)      Surgeon(s) and Role:     * Santana Brown Jr., MD - Primary     * Darian Murphy MD - Assisting     Assistance: Due to having no qualified resident during critical portions of the case, Dr. Darian Murphy acted as an assistant.     Pre-Operative Diagnosis: Esophageal adenocarcinoma, distal 1/3     Post-Operative Diagnosis: Same     Pre-Operative Variables:  Stage: T3 N0  Adjacent organ involvement: None  Chemotherapy within 90 Days: Yes  Radiation Therapy within 90 Days: Yes     Comorbidities:  Morbid obesity  Type 2 diabetes mellitus  Obstructive sleep apnea  Gout  Hyperparathyroidism  Hypertension  Severe obesity  Coronary artery disease  Heart failure with reduced ejection fraction    Procedure:  Robotic-assisted Vj three field esophagectomy  Regional mediastinal lymph node dissection  Botox injection of the pylorus  Jejunostomy tube placement     Operative Findings:                No evidence of distant intraperitoneal metastases in the chest or abdomen  Esophageal tumor located in the distal third of the esophagus, mild radiation changes appreciated.  Resection status:  R0 pending final pathology.  No gross disease remaining post dissection.  Frozen sections on proximal and distal margins negative for malignancy  100u Botox injection into the pylorus  Stapled esophagogastrostomy performed at the neck incision after confirmation of negative margins.  Jejunostomy tube placed      3/28/2023 Cancer Staged    Staging form: Esophagus - Adenocarcinoma, AJCC 8th Edition  - Pathologic stage from 3/28/2023: Stage II (ypT3, pN0, cM0, G2)     5/1/2023 - 5/1/2023 Chemotherapy    Treatment Summary   Plan Name: [facility] [id number] ARM C  ADJUVANT NIVOLUMAB  Treatment Goal: Curative  Status: Inactive  Start Date: 5/1/2023  End Date: 5/1/2023  Provider: Miki Medrano MD  Chemotherapy: [No matching medication found in this treatment plan]     5/29/2023 -  Chemotherapy    Treatment Summary   Plan Name: [facility] [id number] ARM C ADJUVANT NIVOLUMAB STEP 2  Treatment Goal: Curative  Status: Active  Start Date: 5/29/2023  End Date: 4/29/2024 (Planned)  Provider: Miki Medrano MD  Chemotherapy: [No matching medication found in this treatment plan]          Interim History:   Mr. Person returns to clinic today prior to cycle 9 of adjuvant nivolumab. He underwent robotic esophagectomy on 3/14/23 with Dr. Brown and J tube insertion.     He is feeling well.  Eating well, weight has been stable.  Denies any pain.  Had a good Thanksgiving.  Gets tired after eating.  Has occasional dysphagia in the mid to upper esophagus.  Eventually able to get the food/liquid down.  No changes in bowel movements.     Presents to clinic alone. ECOG 0.     Review of Systems   Constitutional:  Negative for chills, diaphoresis, fever, malaise/fatigue and weight loss.   HENT:  Negative for sore throat.    Eyes:  Negative for blurred vision and double vision.   Respiratory:  Negative for cough and shortness of breath.    Cardiovascular:  Negative for chest pain, palpitations and leg swelling.   Gastrointestinal:  Negative for abdominal pain, blood in stool, constipation, diarrhea, heartburn, nausea and vomiting.        Dysphagia   Genitourinary:  Negative for dysuria, frequency, hematuria and urgency.   Musculoskeletal:  Negative for back pain, falls, myalgias and neck pain.   Skin:  Positive for itching and rash.   Neurological:  Negative for dizziness, tingling, weakness and headaches.   Psychiatric/Behavioral:  The patient is not nervous/anxious.      Past Medical History:   Past Medical History:   Diagnosis Date    Anticoagulant long-term use     Cancer     Diabetes mellitus      Onset late 50s/early 60s    Hyperlipidemia     Hypertension     Onset late 50s/early 60s    Kidney stones     Sleep apnea     since 2006      Past Surgical History:   Past Surgical History:   Procedure Laterality Date    COLONOSCOPY N/A 10/26/2023    Procedure: COLONOSCOPY;  Surgeon: Noah Duong MD;  Location: [facility] (4TH FLR);  Service: Endoscopy;  Laterality: N/A;  instructions sent to patient portal. Tb  referral: Dr. Wilson  Pt. on Xarelto--tb-ok to hold see te 8/15/23 dr vu  10/13-precall complete-MS  10/19 pt rescheduled, Xarelto hold, PEG, portal -ml  10/24-precall complete-KPvt    CORONARY ANGIOGRAPHY N/A 9/30/2020    Procedure: ANGIOGRAM, CORONARY ARTERY;  Surgeon: John West MD;  Location: [facility] CATH LAB;  Service: Cardiology;  Laterality: N/A;    CYSTOSCOPY N/A 2/17/2022    Procedure: CYSTOSCOPY;  Surgeon: Lukasz Hughes MD;  Location: [facility] OR 1ST FLR;  Service: Urology;  Laterality: N/A;    CYSTOSCOPY W/ URETERAL STENT PLACEMENT N/A 2/25/2022    Procedure: CYSTOSCOPY, WITH URETERAL STENT INSERTION;  Surgeon: Lukasz Hughes MD;  Location: [facility] OR 1ST FLR;  Service: Urology;  Laterality: N/A;    ENDOSCOPIC ULTRASOUND OF UPPER GASTROINTESTINAL TRACT N/A 12/7/2022    Procedure: ULTRASOUND, UPPER GI TRACT, ENDOSCOPIC;  Surgeon: Darian Main MD;  Location: [facility];  Service: Endoscopy;  Laterality: N/A;  Approval to hold Xarelto rec'd from Dr. Vu (see t/e 12/5/22)-DS    ESOPHAGOGASTRODUODENOSCOPY N/A 11/17/2022    Procedure: EGD (ESOPHAGOGASTRODUODENOSCOPY);  Surgeon: Brody Gonzales MD;  Location: [facility] (2ND FLR);  Service: Endoscopy;  Laterality: N/A;  inst via email-ok to hold Xarelto x 2 days-MS    ESOPHAGOGASTRODUODENOSCOPY N/A 5/31/2023    Procedure: EGD (ESOPHAGOGASTRODUODENOSCOPY) WITH DILATION;  Surgeon: Santana Brown Jr., MD;  Location: [facility] [address] [place] [address];  Service: General;  Laterality: N/A;    LASER LITHOTRIPSY Left 2/25/2022    Procedure:  LITHOTRIPSY, USING LASER;  Surgeon: Lukasz Hughes MD;  [facility];  Service: Urology;  Laterality: Left;    LEFT HEART CATHETERIZATION Left 9/30/2020    Procedure: Left heart cath;  Surgeon: John West MD;  Location: [facility] CATH LAB;  Service: Cardiology;  Laterality: Left;    LITHOTRIPSY      PARATHYROIDECTOMY  1/1/2-107    PYELOSCOPY Left 2/25/2022    Procedure: PYELOSCOPY;  Surgeon: Lukasz Hughes MD;  Location: [facility] OR [address] [place] [address];  Service: Urology;  Laterality: Left;    ROBOT-ASSISTED SURGICAL REMOVAL OF ESOPHAGUS USING DA CARLOS XI N/A 3/14/2023    Procedure: XI ROBOTIC ESOPHAGECTOMY;  Surgeon: Santana Brown Jr., MD;  Location: [address];  Service: General;  Laterality: N/A;  Abdomen, Chest and Neck    ROBOTIC JEJUNOSTOMY N/A 3/14/2023    Procedure: ROBOTIC JEJUNOSTOMY TUBE INSERTION;  Surgeon: Santana Brown Jr., MD;  [address];  Service: General;  Laterality: N/A;    TRANSESOPHAGEAL ECHOCARDIOGRAPHY N/A 4/7/2022    Procedure: ECHOCARDIOGRAM, TRANSESOPHAGEAL;  Surgeon: [facility];  Location: [facility] EP LAB;  Service: Cardiology;  Laterality: N/A;    TREATMENT OF CARDIAC ARRHYTHMIA N/A 4/7/2022    Procedure: Cardioversion or Defibrillation;  Surgeon: Iris Fisher NP;  Location: [facility] EP LAB;  Service: Cardiology;  Laterality: N/A;  afib, dccv, artie, anes, EH, 3prep    URETEROSCOPIC REMOVAL OF URETERIC CALCULUS Left 2/25/2022    Procedure: REMOVAL, CALCULUS, URETER, URETEROSCOPIC;  Surgeon: Lukasz Hughes MD;  Location: [address];  Service: Urology;  Laterality: Left;    URETEROSCOPY Left 2/25/2022    Procedure: URETEROSCOPY;  Surgeon: Lukasz Hughes MD;  Location: [facility] OR [address] [place] [address];  Service: Urology;  Laterality: Left;    VOCAL CORD INJECTION Left 3/17/2023    Procedure: INJECTION, VOCAL CORD, LARYNGOSCOPIC;  Surgeon: Gm Altman MD;  Location: [facility] OR [address] [place] [address];  Service: ENT;  Laterality: Left;      Family History:    Family History   Problem Relation Age of Onset    Arthritis Mother     Heart disease Father 70        CABG    Arthritis Father     Diabetes Father     Kidney disease Father         had one kidney removed in early thirties    Arthritis Sister     Arthritis Sister     Hypertension Sister     Colon cancer Brother 32    Arthritis Brother     Alcohol abuse Paternal Aunt     Cancer Maternal Grandfather         throat cancer    Diabetes Paternal Grandmother     Colon polyps Paternal Grandfather     Colon cancer Paternal Grandfather     Cancer Paternal Grandfather         colon cancer at age 62      Social History:   Social History     Tobacco Use    Smoking status: Former     Current packs/day: 0.00     Average packs/day: 1 pack/day for 22.7 years (22.7 ttl pk-yrs)     Types: Cigarettes, Cigars     Start date: 1972     Quit date:      Years since quittin.5     Passive exposure: Never    Smokeless tobacco: Never    Tobacco comments:     smoking was off and on.  cumulative 7 years.  never more than three years in one stretch or more lj   Substance Use Topics    Alcohol use: Yes     Alcohol/week: 4.0 standard drinks of alcohol     Types: 4 Shots of liquor per week      I have reviewed and updated the patient's past medical, surgical, family and social histories.    Allergies:   Review of patient's allergies indicates:  No Known Allergies     Medications:   Current Outpatient Medications   Medication Sig Dispense Refill    allopurinoL (ZYLOPRIM) 100 MG tablet TAKE 1 TABLET BY MOUTH EVERY DAY 30 tablet 10    benzonatate (TESSALON) 200 MG capsule Take 1 capsule (200 mg total) by mouth 3 (three) times daily as needed. 30 capsule 0    blood sugar diagnostic (ACCU-CHEK ANTONELLA PLUS TEST STRP) Strp Use to test CBG four times daily E11.65 400 strip 3    blood-glucose meter kit Checks blood sugars 1x/daily. 1 each 12    citric acid-potassium citrate (POLYCITRA) 1,100-334 mg/5 mL solution Take 10 mLs (20 mEq total) by  mouth 2 (two) times a day. 600 mL 5    hydroCHLOROthiazide (HYDRODIURIL) 25 MG tablet Take 1 tablet (25 mg total) by mouth once daily. 30 tablet 11    ipratropium (ATROVENT) 21 mcg (0.03 %) nasal spray 2 sprays by Each Nostril route 2 (two) times daily as needed. 30 mL 0    lancets (ACCU-CHEK SOFTCLIX LANCETS) Misc Use to test CBG 4 times daily E11.65 400 each 1    metoprolol succinate (TOPROL-XL) 25 MG 24 hr tablet Take 0.5 tablets (12.5 mg total) by mouth once daily. 15 tablet 11    nitroGLYCERIN (NITROSTAT) 0.4 MG SL tablet Place 1 tablet (0.4 mg total) under the tongue every 5 (five) minutes as needed for Chest pain. If you need a third tablet, call 911 100 tablet 3    omeprazole (PRILOSEC) 40 MG capsule TAKE 1 CAPSULE(40 MG) BY MOUTH EVERY DAY 90 capsule 3    rivaroxaban (XARELTO) 20 mg Tab Take 1 tablet (20 mg total) by mouth daily with dinner or evening meal. 90 tablet 3    rosuvastatin (CRESTOR) 20 MG tablet Take 1 tablet (20 mg total) by mouth every evening. 90 tablet 3    tamsulosin (FLOMAX) 0.4 mg Cap TAKE 1 CAPSULE(0.4 MG) BY MOUTH EVERY DAY 30 capsule 2    testosterone (ANDROGEL) 20.25 mg/1.25 gram (1.62 %) GlPm Apply 4 pumps to shoulders daily 2 each 5    triamcinolone acetonide 0.1% (KENALOG) 0.1 % cream Apply topically 2 (two) times daily. 45 g 1     No current facility-administered medications for this visit.      Physical Exam:   BP (!) 150/67 (BP Location: Left arm, Patient Position: Sitting, BP Method: Large (Automatic))   Pulse 72   Resp 18   Ht 6' (1.829 m)   Wt 85.5 kg (188 lb 6.1 oz)   SpO2 97%   BMI 25.55 kg/m²      ECOG Performance status: 0    Physical Exam  Vitals reviewed.   Constitutional:       General: He is not in acute distress.     Appearance: Normal appearance. He is not ill-appearing, toxic-appearing or diaphoretic.   HENT:      Head: Normocephalic and atraumatic.      Right Ear: External ear normal.      Left Ear: External ear normal.      Nose: Nose normal. No congestion.       Mouth/Throat:      Pharynx: Oropharynx is clear.   Eyes:      General: No scleral icterus.     Extraocular Movements: Extraocular movements intact.      Conjunctiva/sclera: Conjunctivae normal.      Pupils: Pupils are equal, round, and reactive to light.   Neck:      Comments: L neck wound well-healed  Cardiovascular:      Rate and Rhythm: Normal rate and regular rhythm.   Pulmonary:      Effort: Pulmonary effort is normal. No respiratory distress.   Abdominal:      General: There is no distension.      Palpations: Abdomen is soft.      Tenderness: There is no abdominal tenderness.   Musculoskeletal:         General: No swelling.      Cervical back: Normal range of motion.   Lymphadenopathy:      Cervical: No cervical adenopathy.   Skin:     Coloration: Skin is not jaundiced.      Findings: Rash (grade I rash to upper chest/back.) present. No bruising or erythema.   Neurological:      General: No focal deficit present.      Mental Status: He is alert and oriented to person, place, and time. Mental status is at baseline.      Cranial Nerves: No cranial nerve deficit.      Motor: No weakness.      Gait: Gait normal.   Psychiatric:         Mood and Affect: Mood normal.         Behavior: Behavior normal.         Thought Content: Thought content normal.         Judgment: Judgment normal.         Labs:   Recent Results (from the past 48 hour(s))   Magnesium    Collection Time: 12/07/23 11:48 AM   Result Value Ref Range    Magnesium 2.0 1.6 - 2.6 mg/dL   PHOSPHORUS    Collection Time: 12/07/23 11:48 AM   Result Value Ref Range    Phosphorus 4.1 2.7 - 4.5 mg/dL   TSH    Collection Time: 12/07/23 11:48 AM   Result Value Ref Range    TSH 0.448 0.400 - 4.000 uIU/mL   BILIRUBIN, DIRECT    Collection Time: 12/07/23 11:48 AM   Result Value Ref Range    Bilirubin, Direct 0.3 0.1 - 0.3 mg/dL   CBC W/ AUTO DIFFERENTIAL    Collection Time: 12/07/23 11:48 AM   Result Value Ref Range    WBC 4.56 3.90 - 12.70 K/uL    RBC 4.55 (L)  4.60 - 6.20 M/uL    Hemoglobin 13.6 (L) 14.0 - 18.0 g/dL    Hematocrit 41.8 40.0 - 54.0 %    MCV 92 82 - 98 fL    MCH 29.9 27.0 - 31.0 pg    MCHC 32.5 32.0 - 36.0 g/dL    RDW 14.1 11.5 - 14.5 %    Platelets 153 150 - 450 K/uL    MPV 10.4 9.2 - 12.9 fL    Immature Granulocytes 0.4 0.0 - 0.5 %    Gran # (ANC) 3.2 1.8 - 7.7 K/uL    Immature Grans (Abs) 0.02 0.00 - 0.04 K/uL    Lymph # 0.7 (L) 1.0 - 4.8 K/uL    Mono # 0.5 0.3 - 1.0 K/uL    Eos # 0.2 0.0 - 0.5 K/uL    Baso # 0.04 0.00 - 0.20 K/uL    nRBC 0 0 /100 WBC    Gran % 70.2 38.0 - 73.0 %    Lymph % 14.3 (L) 18.0 - 48.0 %    Mono % 10.7 4.0 - 15.0 %    Eosinophil % 3.5 0.0 - 8.0 %    Basophil % 0.9 0.0 - 1.9 %    Differential Method Automated    COMPREHENSIVE METABOLIC PANEL    Collection Time: 12/07/23 11:48 AM   Result Value Ref Range    Sodium 144 136 - 145 mmol/L    Potassium 4.4 3.5 - 5.1 mmol/L    Chloride 104 95 - 110 mmol/L    CO2 31 (H) 23 - 29 mmol/L    Glucose 103 70 - 110 mg/dL    BUN 15 8 - 23 mg/dL    Creatinine 1.0 0.5 - 1.4 mg/dL    Calcium 9.7 8.7 - 10.5 mg/dL    Total Protein 7.3 6.0 - 8.4 g/dL    Albumin 4.0 3.5 - 5.2 g/dL    Total Bilirubin 0.7 0.1 - 1.0 mg/dL    Alkaline Phosphatase 104 55 - 135 U/L    AST 19 10 - 40 U/L    ALT 22 10 - 44 U/L    eGFR >60.0 >60 mL/min/1.73 m^2    Anion Gap 9 8 - 16 mmol/L     Imaging:    10/13/23 - CT CAP:    Impression:     1. Postsurgical change of esophagectomy and gastric pull-through for esophageal adenocarcinoma.  No signs of recurrence or metastatic disease.  2. Additional findings as above.  RECIST SUMMARY:     Date of prior examination for comparison: 04/11/2023     No measurable lesions for RECIST criteria.    Path:   3/14/23:  Final Pathologic Diagnosis 1. LYMPH NODE, LEVEL 7, EXCISION:   One benign lymph node (0/1)   2. TOTAL THORACIC ESOPHAGECTOMY AND PROXIMAL STOMACH:   Adenocarcinoma, moderately differentiated, 3.0 cm   Margins are negative   Tumor invades adventitia   Extensive residual cancer  with no evident tumor regression (poor or no   response, score 3)   Eleven benign lymph nodes (0/11)   3. RESIDUAL CONDUIT, EXCISION:   Negative for malignancy   SYNOPTIC REPORT   Procedure - Esophageal gastrectomy   Tumor site - GE Junction   Relationship of tumor to esophagogastric junction - Lesion is central at GE   junction   Tumor size - 3.0 x 3.1cm   Histologic type - Adenocarcinoma   Histologic grade - Moderately differentiated   Microscopic tumor extension - Tumor invades adventitia   Margins - All margins of resection are uninvolved, proximal, distal and   adventitial margins are negative   Treatment effect - Extensive residual cancer with no evident tumor regression   (poor or no response, score 3)   Lymphovascular invasion - Not identified   Pathologic staging - yp T3 N0 Mx   Lymph nodes examined - 12   Lymph nodes involved - 0   Additional pathologic findings - Intestinal metaplasia present   MMR-IHC has been performed on previous biopsy ([id number]) and shows all 4   antibodies intact          Assessment:       1. Esophageal adenocarcinoma    2. Esophageal dysphagia    3. Vocal cord paralysis    4. Hypertension associated with diabetes    5. Hyperlipidemia associated with type 2 diabetes mellitus    6. Type 2 diabetes mellitus with stage 3 chronic kidney disease, without long-term current use of insulin, unspecified whether stage 3a or 3b CKD    7. Type 2 diabetes mellitus with other diabetic kidney complication, without long-term current use of insulin    8. Coronary artery disease involving native coronary artery of native heart without angina pectoris    9. Paroxysmal atrial fibrillation    10. HFrEF (heart failure with reduced ejection fraction)    11. Rash        Plan:     # Esophageal adenocarcinoma   Mr. Person is a pleasant 67 year old male who presents to our clinic for management of esophageal cancer. He initially presented with dysphagia, and further workup confirmed a stage II  adenocarcinoma, pMMR. Tumor staged T3N0Mx by endosonographic criteria, JEVON. CT CAP on 12/14/22 confirmed no evidence of metastatic disease.    Previously conversation regarding his diagnosis and treatment options.  Recommended perioperative chemo/radiation per the CROSS trial. Discussed chemoradiation for ~5 weeks with 5 doses of weekly carboplatin and taxol administered. Plan to obtain restaging scans 4-5 weeks after radiation completed.     He was a candidate for a cooperative group trial assessing perioperative immunotherapy treatment. He consented for this study - ECOG-ACRIN [id number]: A Phase II/III Study of Dilcia-operative Nivolumab and Ipilimumab in Patients with Locoregional Esophageal and Gastroesophageal Junction Adenocarcinoma    Previously met with Dr. Santana Brown who agreed he was a surgical candidate.  Previously met with Dr. Javier Moulton who will be treating him with radiation.    Began cycle 1 carboplatin/paclitaxel/nivolumab on trial on 12/29/22.  Received cycle 4 of weekly chemotherapy on trial on 1/20/23.   We held chemotherapy for week 5 because of thrombocytopenia per protocol.    Completed radiation on 2/1/23.    Restaging PET/CT personally reviewed on 3/1/23 shows interval decreased FDG avidity in esophageal tumor, no new sites of disease.  CT CAP 3/2/23 shows stable esophageal thickening.    Underwent robotic esophagectomy on 3/14/23 with Dr. Brown.  Pathology showed negative margins, ypT3 N0 tumor with 0 of 12 lymph nodes involved.  No treatment effect was noted on the tumor tissue.    Discussed that per the [id number] protocol will proceed with randomization to adjuvant nivolumab +/- ipilimumab.  Patient randomized to receive adjuvant Nivolumab, started cycle 1 at 480 mg q4 weeks 5/1/23.  Plan for twelve months per protocol.    Tolerated very well. Grade 1 pruritis.    Repeat imaging after cycle 6 shows DAVE.    Proceed with cycle 9 today at 480 mg q4 weeks.  Labs reviewed, adequate for  treatment.   Timeout occurred with myself and Maria Esther SORTO. Orders signed.    Underwent dilation with Dr. Brown on 5/31/23 with temporary improvement in dysphagia. Weight stable.  Still having some dysphagia so will order esophogram.   PD-L1 CPS 30.    RTC in 4 weeks.    # Vocal cord paralysis  Post-op. S/p injection by ENT.  Stable.  Last evaluated 9/11/23 by ENT with improvement.    No further interventions planned at this time.    # HTN, HLD, DM, CKD  Following with PCP Dr. Wilson and nephrologist Dr. Oconnell.   BP elevated in clinic today. Asymptomatic. Has been off his HCTZ/lisinopril because of prior hypotension.  Cr stable.    # CAD, A fib, CHF  Following with cardiologist Dr. Nick & EP Dr. Phillip.   Continues on Xarelto.  Continue medical management.     # Rash  Grade I, very mild. Likely 2/2 immunotherapy.   Continue triamcinolone - symptoms controlled.   Continue to monitor.     Follow up: 4 weeks per research.    Patient is in agreement with the proposed treatment plan. All questions were answered to the patient's satisfaction. Pt knows to call clinic if anything is needed before the next clinic visit.    Miki Medrano MD  Hematology/Oncology  [facility]    Route Chart for Scheduling    Med Onc Chart Routing      Follow up with physician . Per research team   Follow up with SAMUEL    Infusion scheduling note    Injection scheduling note    Labs    Imaging   Please help schedule esophogram   Pharmacy appointment    Other referrals              Treatment Plan Information   [facility] [id number] ARM C ADJUVANT NIVOLUMAB STEP 2   Miki Medrano MD   Upcoming Treatment Dates - [facility] [id number] ARM C ADJUVANT NIVOLUMAB STEP 2    12/11/2023       Chemotherapy       INV nivolumab 480 mg in INV sodium chloride 0.9 % 100 mL chemo infusion  1/8/2024       Chemotherapy       INV nivolumab 480 mg in INV sodium chloride 0.9 % 100 mL chemo infusion  2/5/2024       Chemotherapy       INV nivolumab 480 mg  in INV sodium chloride 0.9 % 100 mL chemo infusion  3/4/2024       Chemotherapy       INV nivolumab 480 mg in INV sodium chloride 0.9 % 100 mL chemo infusion    Supportive Plan Information  IV FLUIDS AND ELECTROLYTES   Miki Medrano MD   Upcoming Treatment Dates - IV FLUIDS AND ELECTROLYTES    No upcoming days in selected categories.    Therapy Plan Information  Flushes  heparin, porcine (PF) 100 unit/mL injection flush 500 Units  500 Units, Intravenous, Every visit  sodium chloride 0.9% flush 10 mL  10 mL, Intravenous, Every visit

## 2023-12-07 NOTE — PROGRESS NOTES
: URIEL Manriquez MD  Treating Investigators: NIRAV Medrano MD  Surgical Oncologist: SARAH Brown M.D. 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 Patient queried & verbalized his consent for continued participation in above-mentioned trial. Patient queried and continues to report mild fatigue, dysphagia, pruritic dermatis in his chest, upper back & scalp that is controlled with the topical cream. Patient denies any new AEs or other ConMed changes.    Patient completed safety labs, VS, PE & ECOG (ECOG = 0, per Dr. Medrano) today per protocol. Dr. Medrano assessed all patient's labs, VS & PE findings as either WNL or NCS, deeming patient eligible to continue treatment with Nivolumab monotherapy.     Weight:  Step 1 Week 1 was 117.2 kg  Step 2 C1D1 was 99.2 kg   ( +/- 10% = 89.3 - 109.2 kg).   Today's weight is 86.8 kg   >20% weight loss since Step 1 Week 1 --> >10% - < 20%change from Step 2 Cycle 1.      Per protocol, Nivolumab is administered at a 480 mg flat dose & cannot be modified. MACARIO & Dr. Medrano performed time-out in exam room.     Infusion RN Elena Byrne was instructed to administer the treatment per the treatment plan with full sets of vital signs pre- and post-dose. RN expressed their understanding of all instructions. Patient completed treatment today without event & was DC'd from clinic ambulatory and in stable condition. Please see Infusion RN note for further information.     Patient was encouraged to contact [facility] or Dr. Medrano's team with any questions or  concerns & was reminded of clinic's 24/7 emergency contact number. Patient verbalized understanding & denies any additional questions at this time.        Step 2 -- C10D1 - 62Mgl0924:  Labs @ 1210 -- [facility] 3rd floor  [facility] @ 1300 -- 2nd floor  Infusion @ 1400 (Nivolumab only)        Solicited AEs -- Assessed 08Dec2023:  ** Anemia - Grade 1 -- 60Nvk0323 - ongoing -- (NCS per MD) -- continued from prior visit  ** Platelet count decreased - Grade 1 (NCS per MD)    97Aut4443 - 53Mqi8396 (RESOLVED)  Neutrophil count decreased - Grade 0  ** Lymphocyte count decreased - Grade 2 -- 02Klk6741 (NCS per MD)  Peripheral motor neuropathy - Grade 0   Peripheral sensory neuropathy - Grade 0   Creatinine increased - Grade 0  Hypothyroidism - Grade 0   (TSH WNL on 16Oct2023)  Diarrhea (patient baseline BM = 2 stools a day) - Grade 0 -- reports loose stools vs. diarrhea   **Fatigue - Grade 1 -- 42Vpc8853 - ongoing (NCS per MD)  **Nausea - Grade 1 - 06Otg1706 - ongoing  (NCS per MD)  **Cough - Grade 1  -- Medical History  Pneumonitis - Grade 0  **Hyperglycemia - Grade 1 -- (Medical History of DMT2 was ended when medications DC'd) - (NCS per MD)    49Fkm5502 - 14Nov2023 (RESOLVED)  Adrenal Insufficiency - Grade 0 -- 16Oct2023 ACTH & cortisol WNL  **Rash-maculo-papular - Grade 1 - 85Aqd5086 - ongoing ( used Sarna [camphor 0.5% - menthol 0.5&] PRN -- Prescribed triamcinolone 24Jul2023 ) -- (NCS per MD)  **Pruritis - Grade 1 - 33Mva2524 - ongoing -- (NCS per MD)  Erythema multiforme - Grade 0  Vomiting - Grade 0    Lipase increased -- Not required per protocol and therefore not assessed this visit.      Ongoing Non-Solicited AEs:  Anorexia - Grade 1 - 09Jan2023 - ongoing (no treatment)  Hoarseness - Grade 2 - 23Sda9175 - ongoing ( no treatment )  Dysphagia - Grade 1 - 23Tgy6028 - ongoing ( no treatment )  Weight loss - Grade 2 - 05Ymr1495 - ongoing ( no treatment )      New or Changed Non-Solicited AEs Since Last Visit:  None        Complete Baseline Medical History, Adverse Events, and Concomitant Medications are located in patient's shadow chart

## 2023-12-07 NOTE — PLAN OF CARE
Pt received D1C9 of INV ninuolumab via R chest port without adverse effects. VS remained stable throughout tx pt prefers to use [facility] for appt schedule. Discharged AAOx4 and ambulatory

## 2023-12-27 DIAGNOSIS — E11.65 TYPE 2 DIABETES MELLITUS WITH HYPERGLYCEMIA, WITHOUT LONG-TERM CURRENT USE OF INSULIN: Primary | ICD-10-CM

## 2023-12-27 RX ORDER — LANCETS
EACH MISCELLANEOUS
Qty: 100 EACH | Refills: 3 | Status: SHIPPED | OUTPATIENT
Start: 2023-12-27 | End: 2023-12-27 | Stop reason: SDUPTHER

## 2023-12-27 NOTE — TELEPHONE ENCOUNTER
No care due was identified.  Health [facility] Embedded Care Due Messages. Reference number: [id number].   12/27/2023 9:43:06 AM CST

## 2023-12-28 RX ORDER — LANCETS
EACH MISCELLANEOUS
Qty: 100 EACH | Refills: 3 | Status: SHIPPED | OUTPATIENT
Start: 2023-12-28

## 2023-12-29 ENCOUNTER — TELEPHONE (OUTPATIENT)
Dept: OPTOMETRY | Facility: CLINIC | Age: 68
End: 2023-12-29
Payer: MEDICARE

## 2023-12-29 ENCOUNTER — OFFICE VISIT (OUTPATIENT)
Dept: OPTOMETRY | Facility: CLINIC | Age: 68
End: 2023-12-29
Payer: MEDICARE

## 2023-12-29 DIAGNOSIS — H43.812 VITREOUS DETACHMENT, LEFT: Primary | ICD-10-CM

## 2023-12-29 PROCEDURE — 92014 COMPRE OPH EXAM EST PT 1/>: CPT | Mod: S$PBB,,, | Performed by: OPTOMETRIST

## 2023-12-29 PROCEDURE — 99213 OFFICE O/P EST LOW 20 MIN: CPT | Mod: PBBFAC,PO | Performed by: OPTOMETRIST

## 2023-12-29 PROCEDURE — 99999 PR PBB SHADOW E&M-EST. PATIENT-LVL III: CPT | Mod: PBBFAC,,, | Performed by: OPTOMETRIST

## 2023-12-29 PROCEDURE — 99999 PR PBB SHADOW E&M-EST. PATIENT-LVL III: ICD-10-PCS | Mod: PBBFAC,,, | Performed by: OPTOMETRIST

## 2023-12-29 PROCEDURE — 92014 PR EYE EXAM, EST PATIENT,COMPREHESV: ICD-10-PCS | Mod: S$PBB,,, | Performed by: OPTOMETRIST

## 2023-12-29 NOTE — TELEPHONE ENCOUNTER
----- Message from Krysta Lal sent at 12/29/2023  9:20 AM CST -----  Contact: pt @[phone]  Lukasz Person calling regarding Appointment Access  (message) for #pt is calling to see if possible can be seen before Mar due to flashes in eye, asking for call back

## 2023-12-29 NOTE — PROGRESS NOTES
HPI    Pt presents with flashing lights in the left field of vision when both   eyes are closed. Pt does not notice the flashing lights when the eyes are   open.   Pt notes no eye pain or visual changes.   Pt has a hx of type 2 diabetes without retinopathy  Last edited by David Luu on 12/29/2023  3:21 PM.            Assessment /Plan     For exam results, see Encounter Report.    Vitreous detachment, left      DM--without retinopathy  Cat OU  Rare PVD photopsia OS X 2 months.  Discussed S+S of RD    PLAN:    Scheduled pt routine appt (he is DM) Feb 2023 when he is due.  Pt to rtc immediately if inc F/F

## 2024-01-02 NOTE — TELEPHONE ENCOUNTER
Appointment scheduled with  with pt.  
Spoke with patient.  Reviewed PET stress results in detail.  I believe the patient likely has a  of the RCA with collaterals.  In view of exertional angina, I  recommend that he proceed with a coronary angiogram. Attempt to use minimal amount of contrast.  It may be preferable to do a diagnostic angiogram first, followed by an intervention, but I will leave that to the interventional cardiologist's discretion.  Patient should hold metformin and fenofibrate for 2 days before and after the procedure.  He should also hydrate adequately.  I will notify his nephrologist as well.  
None

## 2024-01-03 ENCOUNTER — HOSPITAL ENCOUNTER (OUTPATIENT)
Dept: CARDIOLOGY | Facility: HOSPITAL | Age: 69
Discharge: HOME | End: 2024-01-03
Attending: INTERNAL MEDICINE
Payer: MEDICARE

## 2024-01-03 VITALS
BODY MASS INDEX: 25.47 KG/M2 | HEART RATE: 60 BPM | DIASTOLIC BLOOD PRESSURE: 61 MMHG | HEIGHT: 72 IN | SYSTOLIC BLOOD PRESSURE: 148 MMHG | WEIGHT: 188 LBS

## 2024-01-03 DIAGNOSIS — I48.0 PAROXYSMAL ATRIAL FIBRILLATION: Chronic | ICD-10-CM

## 2024-01-03 LAB
ASCENDING AORTA: 3.4 CM
AV INDEX (PROSTH): 0.4
AV MEAN GRADIENT: 12 MMHG
AV PEAK GRADIENT: 22 MMHG
AV VALVE AREA BY VELOCITY RATIO: 1.47 CM²
AV VALVE AREA: 1.63 CM²
AV VELOCITY RATIO: 0.36
BSA FOR ECHO PROCEDURE: 2.08 M2
CV ECHO LV RWT: 0.46 CM
DOP CALC AO PEAK VEL: 2.36 M/S
DOP CALC AO VTI: 57.35 CM
DOP CALC LVOT AREA: 4 CM2
DOP CALC LVOT DIAMETER: 2.27 CM
DOP CALC LVOT PEAK VEL: 0.86 M/S
DOP CALC LVOT STROKE VOLUME: 93.64 CM3
DOP CALCLVOT PEAK VEL VTI: 23.15 CM
E WAVE DECELERATION TIME: 228.85 MSEC
E/A RATIO: 1.4
E/E' RATIO: 7 M/S
ECHO LV POSTERIOR WALL: 1 CM (ref 0.6–1.1)
FRACTIONAL SHORTENING: 29 % (ref 28–44)
INTERVENTRICULAR SEPTUM: 0.7 CM (ref 0.6–1.1)
IVRT: 121.79 MSEC
LA MAJOR: 5.53 CM
LA MINOR: 5.29 CM
LA WIDTH: 5.02 CM
LEFT ATRIUM SIZE: 4.15 CM
LEFT ATRIUM VOLUME INDEX MOD: 39.9 ML/M2
LEFT ATRIUM VOLUME INDEX: 46.3 ML/M2
LEFT ATRIUM VOLUME MOD: 82.58 CM3
LEFT ATRIUM VOLUME: 95.75 CM3
LEFT INTERNAL DIMENSION IN SYSTOLE: 3.07 CM (ref 2.1–4)
LEFT VENTRICLE DIASTOLIC VOLUME INDEX: 41.13 ML/M2
LEFT VENTRICLE DIASTOLIC VOLUME: 85.13 ML
LEFT VENTRICLE MASS INDEX: 56 G/M2
LEFT VENTRICLE SYSTOLIC VOLUME INDEX: 17.8 ML/M2
LEFT VENTRICLE SYSTOLIC VOLUME: 36.89 ML
LEFT VENTRICULAR INTERNAL DIMENSION IN DIASTOLE: 4.35 CM (ref 3.5–6)
LEFT VENTRICULAR MASS: 116.36 G
LV LATERAL E/E' RATIO: 6.42 M/S
LV SEPTAL E/E' RATIO: 7.7 M/S
MV A" WAVE DURATION": 15.6 MSEC
MV PEAK A VEL: 0.55 M/S
MV PEAK E VEL: 0.77 M/S
MV STENOSIS PRESSURE HALF TIME: 62.22 MS
MV VALVE AREA P 1/2 METHOD: 3.54 CM2
PISA TR MAX VEL: 1.92 M/S
PULM VEIN S/D RATIO: 1.17
PV PEAK D VEL: 0.42 M/S
PV PEAK S VEL: 0.49 M/S
RA MAJOR: 5.76 CM
RA PRESSURE ESTIMATED: 8 MMHG
RA WIDTH: 4.1 CM
RIGHT VENTRICULAR END-DIASTOLIC DIMENSION: 4.24 CM
RV TB RVSP: 10 MMHG
SINUS: 3.13 CM
STJ: 2.55 CM
TDI LATERAL: 0.12 M/S
TDI SEPTAL: 0.1 M/S
TDI: 0.11 M/S
TR MAX PG: 15 MMHG
TRICUSPID ANNULAR PLANE SYSTOLIC EXCURSION: 2.88 CM
TV REST PULMONARY ARTERY PRESSURE: 23 MMHG
Z-SCORE OF LEFT VENTRICULAR DIMENSION IN END DIASTOLE: -3.69
Z-SCORE OF LEFT VENTRICULAR DIMENSION IN END SYSTOLE: -1.79

## 2024-01-03 PROCEDURE — 93306 TTE W/DOPPLER COMPLETE: CPT | Mod: 26,,, | Performed by: INTERNAL MEDICINE

## 2024-01-03 PROCEDURE — 93306 TTE W/DOPPLER COMPLETE: CPT

## 2024-01-04 ENCOUNTER — TELEPHONE (OUTPATIENT)
Dept: CARDIOLOGY | Facility: CLINIC | Age: 69
End: 2024-01-04
Payer: MEDICARE

## 2024-01-04 NOTE — TELEPHONE ENCOUNTER
----- Message from Pallavi Nick MD sent at 1/4/2024 12:06 AM CST -----  Heart function is within normal limits. There is some calcium within the aortic valve but there is only mild narrowing.  Pl ask pt to check his BP at home and let us know if he starts having SBPs >130. Review BP readings in 1 mo. I'd like to add Entresto or an ARB if BP permits.

## 2024-01-05 ENCOUNTER — CLINICAL SUPPORT (OUTPATIENT)
Dept: REHABILITATION | Facility: HOSPITAL | Age: 69
End: 2024-01-05
Payer: MEDICARE

## 2024-01-05 DIAGNOSIS — R53.81 PHYSICAL DECONDITIONING: Primary | ICD-10-CM

## 2024-01-05 DIAGNOSIS — F43.29 STRESS AND ADJUSTMENT REACTION: ICD-10-CM

## 2024-01-05 PROCEDURE — 97535 SELF CARE MNGMENT TRAINING: CPT

## 2024-01-05 PROCEDURE — 97110 THERAPEUTIC EXERCISES: CPT

## 2024-01-05 PROCEDURE — 97112 NEUROMUSCULAR REEDUCATION: CPT

## 2024-01-05 NOTE — PROGRESS NOTES
[facility]  Occupational Therapy Progress and Treatment Note - Therapeutic Yoga Progam    Date: 1/5/2024  Name: Lukasz Person  Clinic Number: [id number]    Therapy Diagnosis:   Encounter Diagnoses   Name Primary?    Physical deconditioning Yes    Stress and adjustment reaction        Physician: Betina Valdovinos, *    Physician Orders: Eval and Treat   Medical Diagnosis from Referral: Chronic low back pain [M54.50, G89.29], Poor posture [R29.3], Esophageal adenocarcinoma [C15.9]  Evaluation Date: 8/22/2023  Authorization Period Expiration: 12/31/23  Plan of Care Expiration: 01/22/24  Progress Note Due: 1/6//23  Visit # / Visits authorized: 7/20     Time in:     8:45 am  Time Out:   9:45 am  Total Billable Time: 60 minutes    SUBJECTIVE     Pt reports: I am feeling good and have done the exercises.  I am still waiting on a call for PT to address my shoulder injury.  He was compliant with home exercise program given last session.   Response to previous treatment: good  Functional change:  pracrticing deeper breathing for relaxation/immune health    Pain:  2/10 in left shoulder due to fall      OBJECTIVE     Objective Measures updated at progress report unless specified.    Patient Specific Functional Scale:           Activity 8/22/23 11/1/23 12/6/23 1/5/24      Poor balance 5    5  6      7     2.  Carrying 20 pounds or more 7     6  7       7     3.  Challenge with stairs 5     6  6      7     4.  Endurance  4      6  7      7     5.               6.             SCORE 5.25    5.75    6.5     7.0        Total Score = Sum of activity scores / number of activities  Minimum Detectable Change (90% CII) for average score = 2 points  Minimum Detectable Change (90% CI) for single activity score = 3 points       Treatment     Lukasz received the treatments listed below:       Date 11/1/23 11/10/23 11/22/23 11/29/23 12/6/23 1/5/24   Therapeutic Yoga Exercises / Neuromuscular Re-education 30  minutes  30 minutes 30 minutes  30 minutes  45 minutes  PN  30 minutes  PN   Seated Yoga   chair yoga:  Cat-Cow,forward bend, back bend, twist,  chair yoga:  Cat-Cow,forward bend, back bend, twist, figure 4 chair yoga:  Cat-Cow,forward bend, back bend, twist, figure 4  chair yoga:  Cat-Cow,forward bend, back bend, twist, figure 4 chair yoga:  Cat-Cow,forward bend, back bend, twist, figure 4   Quadruped     Camel wih chair in high kneel    Supine Twist x 2, knees to chest, Chair pose with block 2 x 5, Twist x 2,( eagle and wide feet),  knees to chest, happy baby flow, knee to chest flow, figure 4, bound angle Chair pose with block 2 x 5, Twist x 2,( eagle and wide feet),  knees to chest, happy baby flow, knee to chest flow, figure 4, bound angle Chair pose with block 3 x 5, Twist x 2,( eagle and wide feet),  knees to chest, happy baby flow, knee to chest flow, figure 4, bound angle, bridge x 1 with block, Modified boat pose with block 3 x 5, Twist x 2,( eagle and wide feet),  knees to chest, happy baby flow, knee to chest flow, figure 4, rolling on small ball, right QL area), for self manual muscle release-HEP Hamstring release with belt,Modified boat pose with block 3 x 5, Twist x 2,( eagle and wide feet),  knees to chest, happy baby flow, knee to chest flow, figure 4,    Prone         standing Warrior 2, chair pose/vilcano x3,   Warrior 2, chair pose/vilcano x3,  Warrior 2, triangle with chair and block, chair pose/vilcano x3, wide straddle with hands on chair,  Warrior 2, chair pose/volcano x3, wide straddle with hands on chair, back extention at wall, twist with wall and chair, Warrior 2, chair pose/volcano x3, wide straddle with hands on chair, standing back ext with hands on sacrum and heels pulling together,                      Self-Care/Home Management  / Therapeutic Activities 15 minutes  15 minutes  15 minutes  15 minutes  15 minutes  15 minutes            Relaxation techniques DB, body scan,    DB,body  scan, DB,body scan, Seated metta  practice DB,body scan,    Restorative  Supine with legs on chair Supine with legs on chair and bolster under hips- this caused reflux reaction Supine with legs on chair and bolster under hips- this caused reflux reaction Supine bridge and fish with blocks,  Supine with bolster unde knees   Activity Pacing                           Stress Management/Education  - physiology of yoga/meditation and immune health - physiology of yoga/meditation and immune health  physiology of yoga/meditation and immune health physiology of yoga/meditation and immune health - physiology of yoga/meditation and immune health - physiology of yoga/meditation and immune health                Patient Education and Home Exercises      Education provided:   - - physiology of yoga/meditation and immune health  - Progress towards goals     Written Home Exercises Provided: yes.  Exercises were reviewed and Lukasz was able to demonstrate them prior to the end of the session.  Lukasz demonstrated good  understanding of the HEP provided. See EMR under Patient Instructions for exercises provided during therapy sessions.       Assessment      In today's session, Lukasz reviewed his HEP.  This focused on strengthening, stretching and relaxation techniques.   He required maximum assist for verbal and neuromuscular cues. He was taught stretch and self release for right hip pain.    Lukasz is progressing well towards his goals and patient prognosis is Good.    Progress Update: Lukasz is making good progress towards his goals.  His PSFS score increased from   6.5 to 7.0 indicating increased functional ability.  He reports his balance and endurance are improving with yoga exercise. He also reports he is using prayer and breathing techniques to help with stress/immune health.   Patient will continue to benefit from skilled outpatient occupational therapy to address the deficits listed in the problem list on initial  evaluation provide pt/family education and to maximize pt's level of independence in the home and community environment. Pt's spiritual, cultural and educational needs considered and pt agreeable to plan of care and goals.    Anticipated barriers to occupational therapy: none    Goals:  Short Term Goals: 6 weeks      Goal # Goal Status   1 Patient will demonstrate independence with diaphragmatic breathing in sit and supine. met   2 Pt. will identify resource for audio body scan to assist with stress management/immune health progressing   3 Patient will identify 2 new stress coping skills for stress management/immune health. met   4 Patient will identify activity pacing problems.  Patient will then implement new plan for daily activity to increase endurance for ADL's. Progressing      Long Term Goals: 12 weeks      Goal # Goal Status   1 Patient will demonstrate independence with yoga Home Exercise Program to increase strength and endurance for ADL's. Progressing   2 Patient will demonstrate independence with relaxation techniques to manage stress for immune health. Progressing   3 Patient will verbalize good understanding of stress/immune health relationship.  Progressing   4            PLAN     Plan of care Certification: 1/5/2024 to 1/22/24.    Outpatient Occupational Therapy 1 times weekly for 14 weeks to include the following interventions: Neuromuscular Re-ed, Patient Education, Self Care, Therapeutic Activities, and Therapeutic Exercise.     Joanan Kimball, OT

## 2024-01-08 ENCOUNTER — OFFICE VISIT (OUTPATIENT)
Dept: HEMATOLOGY/ONCOLOGY | Facility: CLINIC | Age: 69
End: 2024-01-08
Payer: MEDICARE

## 2024-01-08 ENCOUNTER — INFUSION (OUTPATIENT)
Dept: INFUSION THERAPY | Facility: HOSPITAL | Age: 69
End: 2024-01-08
Payer: MEDICARE

## 2024-01-08 ENCOUNTER — RESEARCH ENCOUNTER (OUTPATIENT)
Dept: RESEARCH | Facility: HOSPITAL | Age: 69
End: 2024-01-08
Payer: MEDICARE

## 2024-01-08 VITALS
HEART RATE: 72 BPM | HEIGHT: 72 IN | SYSTOLIC BLOOD PRESSURE: 174 MMHG | OXYGEN SATURATION: 98 % | BODY MASS INDEX: 25.63 KG/M2 | TEMPERATURE: 98 F | DIASTOLIC BLOOD PRESSURE: 77 MMHG | WEIGHT: 189.25 LBS

## 2024-01-08 VITALS
SYSTOLIC BLOOD PRESSURE: 137 MMHG | WEIGHT: 189.25 LBS | HEART RATE: 89 BPM | BODY MASS INDEX: 25.63 KG/M2 | DIASTOLIC BLOOD PRESSURE: 62 MMHG | OXYGEN SATURATION: 99 % | RESPIRATION RATE: 16 BRPM | TEMPERATURE: 98 F | HEIGHT: 72 IN

## 2024-01-08 DIAGNOSIS — J38.00 VOCAL CORD PARALYSIS: ICD-10-CM

## 2024-01-08 DIAGNOSIS — E11.22 TYPE 2 DIABETES MELLITUS WITH STAGE 3 CHRONIC KIDNEY DISEASE, WITHOUT LONG-TERM CURRENT USE OF INSULIN, UNSPECIFIED WHETHER STAGE 3A OR 3B CKD: ICD-10-CM

## 2024-01-08 DIAGNOSIS — I50.20 HFREF (HEART FAILURE WITH REDUCED EJECTION FRACTION): ICD-10-CM

## 2024-01-08 DIAGNOSIS — C15.9 ESOPHAGEAL ADENOCARCINOMA: Primary | ICD-10-CM

## 2024-01-08 DIAGNOSIS — D69.6 THROMBOCYTOPENIA: ICD-10-CM

## 2024-01-08 DIAGNOSIS — E44.0 MODERATE MALNUTRITION: ICD-10-CM

## 2024-01-08 DIAGNOSIS — R21 RASH: ICD-10-CM

## 2024-01-08 DIAGNOSIS — D84.81 IMMUNODEFICIENCY SECONDARY TO NEOPLASM: ICD-10-CM

## 2024-01-08 DIAGNOSIS — R13.19 ESOPHAGEAL DYSPHAGIA: ICD-10-CM

## 2024-01-08 DIAGNOSIS — D49.9 IMMUNODEFICIENCY SECONDARY TO NEOPLASM: ICD-10-CM

## 2024-01-08 DIAGNOSIS — I15.2 HYPERTENSION ASSOCIATED WITH DIABETES: ICD-10-CM

## 2024-01-08 DIAGNOSIS — N18.30 TYPE 2 DIABETES MELLITUS WITH STAGE 3 CHRONIC KIDNEY DISEASE, WITHOUT LONG-TERM CURRENT USE OF INSULIN, UNSPECIFIED WHETHER STAGE 3A OR 3B CKD: ICD-10-CM

## 2024-01-08 DIAGNOSIS — E78.5 HYPERLIPIDEMIA ASSOCIATED WITH TYPE 2 DIABETES MELLITUS: ICD-10-CM

## 2024-01-08 DIAGNOSIS — E11.69 HYPERLIPIDEMIA ASSOCIATED WITH TYPE 2 DIABETES MELLITUS: ICD-10-CM

## 2024-01-08 DIAGNOSIS — I25.10 CORONARY ARTERY DISEASE INVOLVING NATIVE CORONARY ARTERY OF NATIVE HEART WITHOUT ANGINA PECTORIS: ICD-10-CM

## 2024-01-08 DIAGNOSIS — I48.0 PAROXYSMAL ATRIAL FIBRILLATION: ICD-10-CM

## 2024-01-08 DIAGNOSIS — E11.59 HYPERTENSION ASSOCIATED WITH DIABETES: ICD-10-CM

## 2024-01-08 PROCEDURE — 99215 OFFICE O/P EST HI 40 MIN: CPT | Mod: Q1,S$PBB,, | Performed by: INTERNAL MEDICINE

## 2024-01-08 PROCEDURE — 96413 CHEMO IV INFUSION 1 HR: CPT | Mod: Q1

## 2024-01-08 PROCEDURE — 99999 PR PBB SHADOW E&M-EST. PATIENT-LVL III: CPT | Mod: PBBFAC,,, | Performed by: INTERNAL MEDICINE

## 2024-01-08 PROCEDURE — 63600175 PHARM REV CODE 636 W HCPCS: Mod: Q1 | Performed by: INTERNAL MEDICINE

## 2024-01-08 PROCEDURE — 25000003 PHARM REV CODE 250: Mod: Q1 | Performed by: INTERNAL MEDICINE

## 2024-01-08 PROCEDURE — 99213 OFFICE O/P EST LOW 20 MIN: CPT | Mod: Q1,PBBFAC,25 | Performed by: INTERNAL MEDICINE

## 2024-01-08 RX ORDER — HEPARIN 100 UNIT/ML
500 SYRINGE INTRAVENOUS
Status: DISCONTINUED | OUTPATIENT
Start: 2024-01-08 | End: 2024-01-08 | Stop reason: HOSPADM

## 2024-01-08 RX ORDER — EPINEPHRINE 0.3 MG/.3ML
0.3 INJECTION SUBCUTANEOUS ONCE AS NEEDED
Status: DISCONTINUED | OUTPATIENT
Start: 2024-01-08 | End: 2024-01-08 | Stop reason: HOSPADM

## 2024-01-08 RX ORDER — DIPHENHYDRAMINE HYDROCHLORIDE 50 MG/ML
50 INJECTION, SOLUTION INTRAMUSCULAR; INTRAVENOUS ONCE AS NEEDED
Status: CANCELLED | OUTPATIENT
Start: 2024-01-08

## 2024-01-08 RX ORDER — SODIUM CHLORIDE 0.9 % (FLUSH) 0.9 %
10 SYRINGE (ML) INJECTION
Status: DISCONTINUED | OUTPATIENT
Start: 2024-01-08 | End: 2024-01-08 | Stop reason: HOSPADM

## 2024-01-08 RX ORDER — HEPARIN 100 UNIT/ML
500 SYRINGE INTRAVENOUS
Status: CANCELLED | OUTPATIENT
Start: 2024-01-08

## 2024-01-08 RX ORDER — SODIUM CHLORIDE 0.9 % (FLUSH) 0.9 %
10 SYRINGE (ML) INJECTION
Status: CANCELLED | OUTPATIENT
Start: 2024-01-08

## 2024-01-08 RX ORDER — DIPHENHYDRAMINE HYDROCHLORIDE 50 MG/ML
50 INJECTION, SOLUTION INTRAMUSCULAR; INTRAVENOUS ONCE AS NEEDED
Status: DISCONTINUED | OUTPATIENT
Start: 2024-01-08 | End: 2024-01-08 | Stop reason: HOSPADM

## 2024-01-08 RX ORDER — EPINEPHRINE 0.3 MG/.3ML
0.3 INJECTION SUBCUTANEOUS ONCE AS NEEDED
Status: CANCELLED | OUTPATIENT
Start: 2024-01-08

## 2024-01-08 RX ADMIN — HEPARIN 500 UNITS: 100 SYRINGE at 03:01

## 2024-01-08 RX ADMIN — SODIUM CHLORIDE: 9 INJECTION, SOLUTION INTRAVENOUS at 01:01

## 2024-01-08 NOTE — PROGRESS NOTES
: URIEL Manriquez MD  Treating Investigators: NIRAV Medrano MD  Surgical Oncologist: SARAH Brown M.D. 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 Dr. Medrano informed patient of the results of the Step 1 data, that there was no significant improvement of response for patients treated with Chemotherapy+RT+Nivolumab vs patients treated with the SOC Chemotherapy+RT. However, the data from the Step 2 adjuvant treatment portion that he is currently enrolled in does not have any reported data, and the study will continue as planned for Step 2. Patient verbalized his understanding and consented to continuing his participation on trial. Patient queried and denies any new or changed AEs or ConMeds.    Patient completed safety labs, VS, PE & ECOG (ECOG = 0, per Dr. Medrano) today per protocol. Dr. Medrano assessed all patient's labs, VS & PE findings as either WNL or NCS, deeming patient eligible to continue treatment with Nivolumab monotherapy.     Weight:  Step 1 Week 1 was 117.2 kg  Step 2 C1D1 was 99.2 kg   ( +/- 10% = 89.3 - 109.2 kg).   Today's weight is 85.9 kg   >20% weight loss since Step 1 Week 1 --> >10% - < 20%change from Step 2 Cycle 1.      Per protocol, Nivolumab is administered at a 480 mg flat dose & cannot be modified. [facility] & Dr. Medarno performed time-out in exam room.     Infusion RN Radha Byrd was instructed to administer the treatment per the treatment plan with full sets of vital signs pre- and post-dose. RN expressed their understanding of all  instructions. Patient completed treatment today without event & was DC'd from clinic ambulatory and in stable condition. Please see Infusion RN note for further information.     Patient was encouraged to contact [facility] or Dr. Medrano's team with any questions or concerns & was reminded of clinic's 24/7 emergency contact number. Patient verbalized understanding & denies any additional questions at this time.        Step 2 -- C11D1 - 91Sed7580:  Labs @ 1200 -- [facility] 3rd floor  [facility] @ 1300 -- 2nd floor  Infusion @ 1400 (Nivolumab only)        Solicited AEs -- Assessed 08Dec2023:  ** Anemia - Grade 1 -- 46Sna3971 - 08Jan2024 -- (NCS per MD) -- continued from prior visit    RESOLVED 08JAN2024  ** Platelet count decreased - Grade 1 - 08Jan2024 - ongoing (NCS per MD)   ** White blood cell count decreased - Grade 1 - 08Jan2024 - ongoing (NCS per MD)  Neutrophil count decreased - Grade 0  ** Lymphocyte count decreased - Grade 2 -- 14Nov2023 - ongoing (NCS per MD)  Peripheral motor neuropathy - Grade 0   Peripheral sensory neuropathy - Grade 0   Creatinine increased - Grade 0  Hypothyroidism - Grade 0   (TSH WNL on 16Oct2023)  Diarrhea (patient baseline BM = 2 stools a day) - Grade 0 -- reports loose stools vs. diarrhea   **Fatigue - Grade 1 -- 16Oct2023 - ongoing (NCS per MD)  **Nausea - Grade 1 - 21Apr2023 - ongoing  (NCS per MD)  **Cough - Grade 1  -- Medical History  Pneumonitis - Grade 0  Hyperglycemia - Grade 0  Adrenal Insufficiency - Grade 0 -- 16Oct2023 ACTH & cortisol WNL  **Rash-maculo-papular - Grade 1 - 75Rxe8889 - ongoing ( used Sarna [camphor 0.5% - menthol 0.5&] PRN -- Prescribed triamcinolone 24Jul2023 ) -- (NCS per MD)  **Pruritis - Grade 1 - 55Kuh2396 - ongoing -- (NCS per MD)  Erythema multiforme - Grade 0  Vomiting - Grade 0    Lipase increased -- Not required per protocol and therefore not assessed this visit.      Ongoing Non-Solicited AEs:  Anorexia - Grade 1 - 09Jan2023 - ongoing (no  treatment)  Hoarseness - Grade 2 - 09Onf1097 - ongoing ( no treatment )  Dysphagia - Grade 1 - 40Zng7184 - ongoing ( no treatment )  Weight loss - Grade 2 - 08Imo1098 - ongoing ( no treatment )      New or Changed Non-Solicited AEs Since Last Visit:  Platelet count decreased - Grade 1 - 08Jan2024 - ongoing ( no treatment )  White blood cell count decreased - Grade 1 - 08Jan2024 - ongoing (NCS per MD)       Complete Baseline Medical History, Adverse Events, and Concomitant Medications are located in patient's shadow chart

## 2024-01-08 NOTE — PROGRESS NOTES
MEDICAL ONCOLOGY - ESTABLISHED PATIENT VISIT    Reason for visit: esophageal cancer    Best Contact Phone Number(s): [phone] (home)      Cancer/Stage/TNM:    Cancer Staging   Esophageal adenocarcinoma  Staging form: Esophagus - Adenocarcinoma, AJCC 8th Edition  - Pathologic stage from 3/28/2023: Stage II (ypT3, pN0, cM0, G2) - Signed by Santana Brown Jr., MD on 3/28/2023       Oncology History   Esophageal adenocarcinoma   12/8/2022 Initial Diagnosis    Esophageal adenocarcinoma     12/21/2022 - 12/21/2022 Chemotherapy    Treatment Summary   Plan Name: OP ESOPHAGEAL PACLITAXEL CARBOPLATIN WEEKLY  Treatment Goal: Curative  Status: Inactive  Start Date:   End Date:   Provider: Miki Medrano MD  Chemotherapy: CARBOplatin (PARAPLATIN) in sodium chloride 0.9% 250 mL chemo infusion, , Intravenous, Clinic/HOD 1 time, 0 of 1 cycle  PACLitaxeL (TAXOL) 50 mg/m2 = 120 mg in sodium chloride 0.9% 250 mL chemo infusion, 50 mg/m2, Intravenous, Clinic/HOD 1 time, 0 of 1 cycle     12/29/2022 - 1/20/2023 Chemotherapy    Treatment Summary   Plan Name: [facility] [id number] ARM B CARBOPLATIN PACLITAXEL NIVOLUMAB  Treatment Goal: Curative  Status: Inactive  Start Date: 12/29/2022  End Date: 1/20/2023  Provider: Miki Medrano MD  Chemotherapy: CARBOplatin (PARAPLATIN) 215 mg in sodium chloride 0.9% 306.5 mL chemo infusion, 215 mg, Intravenous, Clinic/HOD 1 time, 4 of 5 cycles  Administration: 215 mg (12/29/2022), 230 mg (1/5/2023), 265 mg (1/13/2023), 265 mg (1/20/2023)  PACLitaxeL (TAXOL) 50 mg/m2 = 120 mg in sodium chloride 0.9% 250 mL chemo infusion, 50 mg/m2 = 120 mg, Intravenous, Clinic/HOD 1 time, 4 of 5 cycles  Dose modification: 50 mg/m2 (original dose 50 mg/m2, Cycle 4)  Administration: 120 mg (12/29/2022), 120 mg (1/5/2023), 120 mg (1/13/2023), 120 mg (1/20/2023)     12/29/2022 - 2/1/2023 Radiation Therapy    Treating physician: Dr. Javier Moulton    Course: C1 CHEST 2022    Treatment Site Ref. ID Energy  Dose/Fx (Gy) #Fx Dose Correction (Gy) Total Dose (Gy) Start Date End Date Elapsed Days   IM Esophagus [id number] 6X 1.8 23 / 23 0 41.4 12/29/2022 2/1/2023 34        3/17/2023 Surgery    Procedure: Procedure(s) (LRB):  XI ROBOTIC ESOPHAGECTOMY (N/A)  ROBOTIC JEJUNOSTOMY TUBE INSERTION (N/A)      Surgeon(s) and Role:     * Santana Brown Jr., MD - Primary     * Darian Murphy MD - Assisting     Assistance: Due to having no qualified resident during critical portions of the case, Dr. Darian Murphy acted as an assistant.     Pre-Operative Diagnosis: Esophageal adenocarcinoma, distal 1/3     Post-Operative Diagnosis: Same     Pre-Operative Variables:  Stage: T3 N0  Adjacent organ involvement: None  Chemotherapy within 90 Days: Yes  Radiation Therapy within 90 Days: Yes     Comorbidities:  Morbid obesity  Type 2 diabetes mellitus  Obstructive sleep apnea  Gout  Hyperparathyroidism  Hypertension  Severe obesity  Coronary artery disease  Heart failure with reduced ejection fraction    Procedure:  Robotic-assisted Vj three field esophagectomy  Regional mediastinal lymph node dissection  Botox injection of the pylorus  Jejunostomy tube placement     Operative Findings:                No evidence of distant intraperitoneal metastases in the chest or abdomen  Esophageal tumor located in the distal third of the esophagus, mild radiation changes appreciated.  Resection status:  R0 pending final pathology.  No gross disease remaining post dissection.  Frozen sections on proximal and distal margins negative for malignancy  100u Botox injection into the pylorus  Stapled esophagogastrostomy performed at the neck incision after confirmation of negative margins.  Jejunostomy tube placed      3/28/2023 Cancer Staged    Staging form: Esophagus - Adenocarcinoma, AJCC 8th Edition  - Pathologic stage from 3/28/2023: Stage II (ypT3, pN0, cM0, G2)     5/1/2023 - 5/1/2023 Chemotherapy    Treatment Summary   Plan Name: [facility] [id number] ARM C  ADJUVANT NIVOLUMAB  Treatment Goal: Curative  Status: Inactive  Start Date: 5/1/2023  End Date: 5/1/2023  Provider: Miki Medrano MD  Chemotherapy: [No matching medication found in this treatment plan]     5/29/2023 -  Chemotherapy    Treatment Summary   Plan Name: [facility] [id number] ARM C ADJUVANT NIVOLUMAB STEP 2  Treatment Goal: Curative  Status: Active  Start Date: 5/29/2023  End Date: 4/29/2024 (Planned)  Provider: Miki Medrano MD  Chemotherapy: [No matching medication found in this treatment plan]          Interim History:   Mr. Person returns to clinic today prior to cycle 10 of adjuvant nivolumab. He underwent robotic esophagectomy on 3/14/23 with Dr. Brown and J tube insertion.     He feels well today.  Has no new side effects. Stable pruritus.  No changes to his dysphagia.  Gets tired after he eats.    Presents to clinic alone. ECOG 0.     Review of Systems   Constitutional:  Negative for chills, diaphoresis, fever, malaise/fatigue and weight loss.   HENT:  Negative for sore throat.    Eyes:  Negative for blurred vision and double vision.   Respiratory:  Negative for cough and shortness of breath.    Cardiovascular:  Negative for chest pain, palpitations and leg swelling.   Gastrointestinal:  Negative for abdominal pain, blood in stool, constipation, diarrhea, heartburn, nausea and vomiting.        Dysphagia   Genitourinary:  Negative for dysuria, frequency, hematuria and urgency.   Musculoskeletal:  Negative for back pain, falls, myalgias and neck pain.   Skin:  Positive for itching and rash.   Neurological:  Negative for dizziness, tingling, weakness and headaches.   Psychiatric/Behavioral:  The patient is not nervous/anxious.      Past Medical History:   Past Medical History:   Diagnosis Date    Anticoagulant long-term use     Cancer     Diabetes mellitus     Onset late 50s/early 60s    Hyperlipidemia     Hypertension     Onset late 50s/early 60s    Kidney stones     Sleep apnea     since 2006       Past Surgical History:   Past Surgical History:   Procedure Laterality Date    COLONOSCOPY N/A 10/26/2023    Procedure: COLONOSCOPY;  Surgeon: Noah Duong MD;  Location: [facility] (4TH FLR);  Service: Endoscopy;  Laterality: N/A;  instructions sent to patient portal. Tb  referral: Dr. Wilson  Pt. on Xarelto--tb-ok to hold see te 8/15/23 dr vu  10/13-precall complete-MS  10/19 pt rescheduled, Xarelto hold, PEG, portal -ml  10/24-precall complete-KPvt    CORONARY ANGIOGRAPHY N/A 9/30/2020    Procedure: ANGIOGRAM, CORONARY ARTERY;  Surgeon: John West MD;  Location: [facility] CATH LAB;  Service: Cardiology;  Laterality: N/A;    CYSTOSCOPY N/A 2/17/2022    Procedure: CYSTOSCOPY;  Surgeon: Lukasz Hughes MD;  Location: [facility] OR 1ST FLR;  Service: Urology;  Laterality: N/A;    CYSTOSCOPY W/ URETERAL STENT PLACEMENT N/A 2/25/2022    Procedure: CYSTOSCOPY, WITH URETERAL STENT INSERTION;  Surgeon: Lukasz Hughes MD;  Location: [facility] OR 1ST FLR;  Service: Urology;  Laterality: N/A;    ENDOSCOPIC ULTRASOUND OF UPPER GASTROINTESTINAL TRACT N/A 12/7/2022    Procedure: ULTRASOUND, UPPER GI TRACT, ENDOSCOPIC;  Surgeon: Darian Main MD;  Location: [facility] ENDO;  Service: Endoscopy;  Laterality: N/A;  Approval to hold Xarelto rec'd from Dr. Vu (see t/e 12/5/22)-DS    ESOPHAGOGASTRODUODENOSCOPY N/A 11/17/2022    Procedure: EGD (ESOPHAGOGASTRODUODENOSCOPY);  Surgeon: Brody Gonzales MD;  Location: [facility] (2ND FLR);  Service: Endoscopy;  Laterality: N/A;  inst via email-ok to hold Xarelto x 2 days-MS    ESOPHAGOGASTRODUODENOSCOPY N/A 5/31/2023    Procedure: EGD (ESOPHAGOGASTRODUODENOSCOPY) WITH DILATION;  Surgeon: Santana Brown Jr., MD;  Location: [facility] OR 2ND FLR;  Service: General;  Laterality: N/A;    LASER LITHOTRIPSY Left 2/25/2022    Procedure: LITHOTRIPSY, USING LASER;  Surgeon: Lukasz Hughes MD;  Location: [facility] OR [address] [place] [address];  Service: Urology;  Laterality: Left;    LEFT HEART  CATHETERIZATION Left 9/30/2020    Procedure: Left heart cath;  Surgeon: John West MD;  Location: [facility] CATH LAB;  Service: Cardiology;  Laterality: Left;    LITHOTRIPSY      PARATHYROIDECTOMY  1/1/2-107    PYELOSCOPY Left 2/25/2022    Procedure: PYELOSCOPY;  Surgeon: Lukasz Hughes MD;  Location: [facility] OR [address] [place] [address];  Service: Urology;  Laterality: Left;    ROBOT-ASSISTED SURGICAL REMOVAL OF ESOPHAGUS USING DA CARLOS XI N/A 3/14/2023    Procedure: XI ROBOTIC ESOPHAGECTOMY;  Surgeon: Santana Brown Jr., MD;  [facility];  Service: General;  Laterality: N/A;  Abdomen, Chest and Neck    ROBOTIC JEJUNOSTOMY N/A 3/14/2023    Procedure: ROBOTIC JEJUNOSTOMY TUBE INSERTION;  Surgeon: Santana Brown Jr., MD;  [facility];  Service: General;  Laterality: N/A;    TRANSESOPHAGEAL ECHOCARDIOGRAPHY N/A 4/7/2022    Procedure: ECHOCARDIOGRAM, TRANSESOPHAGEAL;  Surgeon: [facility] Diagnostic Provider;  Location: [facility] EP LAB;  Service: Cardiology;  Laterality: N/A;    TREATMENT OF CARDIAC ARRHYTHMIA N/A 4/7/2022    Procedure: Cardioversion or Defibrillation;  Surgeon: Iris Fisher NP;  Location: [facility] EP LAB;  Service: Cardiology;  Laterality: N/A;  afib, dccv, artie, anes, EH, 3prep    URETEROSCOPIC REMOVAL OF URETERIC CALCULUS Left 2/25/2022    Procedure: REMOVAL, CALCULUS, URETER, URETEROSCOPIC;  Surgeon: Lukasz Hughes MD;  [address]  Service: Urology;  Laterality: Left;    URETEROSCOPY Left 2/25/2022    Procedure: URETEROSCOPY;  Surgeon: Lukasz Hughes MD;  [facility];  Service: Urology;  Laterality: Left;    VOCAL CORD INJECTION Left 3/17/2023    Procedure: INJECTION, VOCAL CORD, LARYNGOSCOPIC;  Surgeon: Gm Altman MD;  [facility];  Service: ENT;  Laterality: Left;      Family History:   Family History   Problem Relation Age of Onset    Arthritis Mother     Heart disease Father 70        CABG    Arthritis Father     Diabetes  Father     Kidney disease Father         had one kidney removed in early thirties    Arthritis Sister     Arthritis Sister     Hypertension Sister     Colon cancer Brother 32    Arthritis Brother     Alcohol abuse Paternal Aunt     Cancer Maternal Grandfather         throat cancer    Diabetes Paternal Grandmother     Colon polyps Paternal Grandfather     Colon cancer Paternal Grandfather     Cancer Paternal Grandfather         colon cancer at age 62      Social History:   Social History     Tobacco Use    Smoking status: Former     Current packs/day: 0.00     Average packs/day: 1 pack/day for 22.7 years (22.7 ttl pk-yrs)     Types: Cigarettes, Cigars     Start date: 1972     Quit date:      Years since quittin.6     Passive exposure: Never    Smokeless tobacco: Never    Tobacco comments:     smoking was off and on.  cumulative 7 years.  never more than three years in one stretch or more lj   Substance Use Topics    Alcohol use: Yes     Alcohol/week: 4.0 standard drinks of alcohol     Types: 4 Shots of liquor per week      I have reviewed and updated the patient's past medical, surgical, family and social histories.    Allergies:   Review of patient's allergies indicates:  No Known Allergies     Medications:   Current Outpatient Medications   Medication Sig Dispense Refill    allopurinoL (ZYLOPRIM) 100 MG tablet TAKE 1 TABLET BY MOUTH EVERY DAY 30 tablet 10    hydroCHLOROthiazide (HYDRODIURIL) 25 MG tablet Take 1 tablet (25 mg total) by mouth once daily. 30 tablet 11    metoprolol succinate (TOPROL-XL) 25 MG 24 hr tablet Take 0.5 tablets (12.5 mg total) by mouth once daily. 15 tablet 11    nitroGLYCERIN (NITROSTAT) 0.4 MG SL tablet Place 1 tablet (0.4 mg total) under the tongue every 5 (five) minutes as needed for Chest pain. If you need a third tablet, call 911 100 tablet 3    omeprazole (PRILOSEC) 40 MG capsule TAKE 1 CAPSULE(40 MG) BY MOUTH EVERY DAY 90 capsule 3    rivaroxaban (XARELTO) 20 mg Tab  Take 1 tablet (20 mg total) by mouth daily with dinner or evening meal. 90 tablet 3    rosuvastatin (CRESTOR) 20 MG tablet Take 1 tablet (20 mg total) by mouth every evening. 90 tablet 3    tamsulosin (FLOMAX) 0.4 mg Cap TAKE 1 CAPSULE(0.4 MG) BY MOUTH EVERY DAY 30 capsule 2    testosterone (ANDROGEL) 20.25 mg/1.25 gram (1.62 %) GlPm Apply 4 pumps to shoulders daily 2 each 5    triamcinolone acetonide 0.1% (KENALOG) 0.1 % cream Apply topically 2 (two) times daily. 45 g 1    blood sugar diagnostic (ACCU-CHEK ANTONELLA PLUS TEST STRP) Strp Use to test CBG four times daily E11.65 (Patient not taking: Reported on 1/8/2024) 400 strip 3    blood-glucose meter kit Checks blood sugars 1x/daily. (Patient not taking: Reported on 1/8/2024) 1 each 12    citric acid-potassium citrate (POLYCITRA) 1,100-334 mg/5 mL solution Take 10 mLs (20 mEq total) by mouth 2 (two) times a day. (Patient not taking: Reported on 1/8/2024) 600 mL 5    ipratropium (ATROVENT) 21 mcg (0.03 %) nasal spray 2 sprays by Each Nostril route 2 (two) times daily as needed. 30 mL 0    lancets (ACCU-CHEK SOFTCLIX LANCETS) Misc USE 1 EVERY DAY OR AS DIRECTED (Patient not taking: Reported on 1/8/2024) 100 each 3     No current facility-administered medications for this visit.      Physical Exam:   BP (!) 174/77 (BP Location: Left arm, Patient Position: Sitting, BP Method: Medium (Automatic))   Pulse 72   Temp 98.4 °F (36.9 °C) (Oral)   Ht 6' (1.829 m)   Wt 85.9 kg (189 lb 4.2 oz)   SpO2 98%   BMI 25.67 kg/m²      ECOG Performance status: 0    Physical Exam  Vitals reviewed.   Constitutional:       General: He is not in acute distress.     Appearance: Normal appearance. He is not ill-appearing, toxic-appearing or diaphoretic.   HENT:      Head: Normocephalic and atraumatic.      Right Ear: External ear normal.      Left Ear: External ear normal.      Nose: Nose normal. No congestion.      Mouth/Throat:      Pharynx: Oropharynx is clear.   Eyes:      General: No  scleral icterus.     Extraocular Movements: Extraocular movements intact.      Conjunctiva/sclera: Conjunctivae normal.      Pupils: Pupils are equal, round, and reactive to light.   Neck:      Comments: L neck wound well-healed  Cardiovascular:      Rate and Rhythm: Normal rate and regular rhythm.   Pulmonary:      Effort: Pulmonary effort is normal. No respiratory distress.   Abdominal:      General: There is no distension.      Palpations: Abdomen is soft.      Tenderness: There is no abdominal tenderness.   Musculoskeletal:         General: No swelling.      Cervical back: Normal range of motion.   Lymphadenopathy:      Cervical: No cervical adenopathy.   Skin:     Coloration: Skin is not jaundiced.      Findings: Rash (grade I rash to upper chest/back.) present. No bruising or erythema.   Neurological:      General: No focal deficit present.      Mental Status: He is alert and oriented to person, place, and time. Mental status is at baseline.      Cranial Nerves: No cranial nerve deficit.      Motor: No weakness.      Gait: Gait normal.   Psychiatric:         Mood and Affect: Mood normal.         Behavior: Behavior normal.         Thought Content: Thought content normal.         Judgment: Judgment normal.         Labs:   Recent Results (from the past 48 hour(s))   Magnesium    Collection Time: 01/08/24  9:05 AM   Result Value Ref Range    Magnesium 1.9 1.6 - 2.6 mg/dL   PHOSPHORUS    Collection Time: 01/08/24  9:05 AM   Result Value Ref Range    Phosphorus 3.7 2.7 - 4.5 mg/dL   CORTISOL, RANDOM    Collection Time: 01/08/24  9:05 AM   Result Value Ref Range    Cortisol 8.60 ug/dL   BILIRUBIN, DIRECT    Collection Time: 01/08/24  9:05 AM   Result Value Ref Range    Bilirubin, Direct 0.3 0.1 - 0.3 mg/dL   Hemoglobin A1C    Collection Time: 01/08/24  9:05 AM   Result Value Ref Range    Hemoglobin A1C 6.2 (H) 4.0 - 5.6 %    Estimated Avg Glucose 131 68 - 131 mg/dL   Comprehensive Metabolic Panel    Collection Time:  01/08/24  9:05 AM   Result Value Ref Range    Sodium 145 136 - 145 mmol/L    Potassium 5.1 3.5 - 5.1 mmol/L    Chloride 106 95 - 110 mmol/L    CO2 31 (H) 23 - 29 mmol/L    Glucose 120 (H) 70 - 110 mg/dL    BUN 13 8 - 23 mg/dL    Creatinine 1.1 0.5 - 1.4 mg/dL    Calcium 9.6 8.7 - 10.5 mg/dL    Total Protein 7.1 6.0 - 8.4 g/dL    Albumin 4.0 3.5 - 5.2 g/dL    Total Bilirubin 0.7 0.1 - 1.0 mg/dL    Alkaline Phosphatase 102 55 - 135 U/L    AST 33 10 - 40 U/L    ALT 42 10 - 44 U/L    eGFR >60.0 >60 mL/min/1.73 m^2    Anion Gap 8 8 - 16 mmol/L   CBC W/ AUTO DIFFERENTIAL    Collection Time: 01/08/24  9:05 AM   Result Value Ref Range    WBC 3.40 (L) 3.90 - 12.70 K/uL    RBC 4.81 4.60 - 6.20 M/uL    Hemoglobin 14.0 14.0 - 18.0 g/dL    Hematocrit 42.0 40.0 - 54.0 %    MCV 87 82 - 98 fL    MCH 29.1 27.0 - 31.0 pg    MCHC 33.3 32.0 - 36.0 g/dL    RDW 14.2 11.5 - 14.5 %    Platelets 128 (L) 150 - 450 K/uL    MPV 9.7 9.2 - 12.9 fL    Immature Granulocytes 0.0 0.0 - 0.5 %    Gran # (ANC) 2.3 1.8 - 7.7 K/uL    Immature Grans (Abs) 0.00 0.00 - 0.04 K/uL    Lymph # 0.6 (L) 1.0 - 4.8 K/uL    Mono # 0.4 0.3 - 1.0 K/uL    Eos # 0.2 0.0 - 0.5 K/uL    Baso # 0.04 0.00 - 0.20 K/uL    nRBC 0 0 /100 WBC    Gran % 66.1 38.0 - 73.0 %    Lymph % 17.1 (L) 18.0 - 48.0 %    Mono % 10.3 4.0 - 15.0 %    Eosinophil % 5.3 0.0 - 8.0 %    Basophil % 1.2 0.0 - 1.9 %    Differential Method Automated    COMPREHENSIVE METABOLIC PANEL    Collection Time: 01/08/24  9:05 AM   Result Value Ref Range    Sodium 144 136 - 145 mmol/L    Potassium 5.0 3.5 - 5.1 mmol/L    Chloride 103 95 - 110 mmol/L    CO2 34 (H) 23 - 29 mmol/L    Glucose 118 (H) 70 - 110 mg/dL    BUN 12 8 - 23 mg/dL    Creatinine 1.0 0.5 - 1.4 mg/dL    Calcium 9.5 8.7 - 10.5 mg/dL    Total Protein 6.9 6.0 - 8.4 g/dL    Albumin 3.9 3.5 - 5.2 g/dL    Total Bilirubin 0.7 0.1 - 1.0 mg/dL    Alkaline Phosphatase 103 55 - 135 U/L    AST 31 10 - 40 U/L    ALT 40 10 - 44 U/L    eGFR >60.0 >60 mL/min/1.73  m^2    Anion Gap 7 (L) 8 - 16 mmol/L       Imaging:    10/13/23 - CT CAP:    Impression:     1. Postsurgical change of esophagectomy and gastric pull-through for esophageal adenocarcinoma.  No signs of recurrence or metastatic disease.  2. Additional findings as above.  RECIST SUMMARY:     Date of prior examination for comparison: 04/11/2023     No measurable lesions for RECIST criteria.    Path:   3/14/23:  Final Pathologic Diagnosis 1. LYMPH NODE, LEVEL 7, EXCISION:   One benign lymph node (0/1)   2. TOTAL THORACIC ESOPHAGECTOMY AND PROXIMAL STOMACH:   Adenocarcinoma, moderately differentiated, 3.0 cm   Margins are negative   Tumor invades adventitia   Extensive residual cancer with no evident tumor regression (poor or no   response, score 3)   Eleven benign lymph nodes (0/11)   3. RESIDUAL CONDUIT, EXCISION:   Negative for malignancy   SYNOPTIC REPORT   Procedure - Esophageal gastrectomy   Tumor site - GE Junction   Relationship of tumor to esophagogastric junction - Lesion is central at GE   junction   Tumor size - 3.0 x 3.1cm   Histologic type - Adenocarcinoma   Histologic grade - Moderately differentiated   Microscopic tumor extension - Tumor invades adventitia   Margins - All margins of resection are uninvolved, proximal, distal and   adventitial margins are negative   Treatment effect - Extensive residual cancer with no evident tumor regression   (poor or no response, score 3)   Lymphovascular invasion - Not identified   Pathologic staging - yp T3 N0 Mx   Lymph nodes examined - 12   Lymph nodes involved - 0   Additional pathologic findings - Intestinal metaplasia present   MMR-IHC has been performed on previous biopsy ([id number]) and shows all 4   antibodies intact          Assessment:       1. Esophageal adenocarcinoma    2. Esophageal dysphagia    3. Vocal cord paralysis    4. Hypertension associated with diabetes    5. Hyperlipidemia associated with type 2 diabetes mellitus    6. Type 2 diabetes  mellitus with stage 3 chronic kidney disease, without long-term current use of insulin, unspecified whether stage 3a or 3b CKD    7. Coronary artery disease involving native coronary artery of native heart without angina pectoris    8. Paroxysmal atrial fibrillation    9. HFrEF (heart failure with reduced ejection fraction)    10. Rash          Plan:     # Esophageal adenocarcinoma   Mr. Person is a pleasant 67 year old male who presents to our clinic for management of esophageal cancer. He initially presented with dysphagia, and further workup confirmed a stage II adenocarcinoma, pMMR. Tumor staged T3N0Mx by endosonographic criteria, JEVON. CT CAP on 12/14/22 confirmed no evidence of metastatic disease.    Previously conversation regarding his diagnosis and treatment options.  Recommended perioperative chemo/radiation per the CROSS trial. Discussed chemoradiation for ~5 weeks with 5 doses of weekly carboplatin and taxol administered. Plan to obtain restaging scans 4-5 weeks after radiation completed.     He was a candidate for a cooperative group trial assessing perioperative immunotherapy treatment. He consented for this study - ECOG-ACRIN [id number]: A Phase II/III Study of Dilcia-operative Nivolumab and Ipilimumab in Patients with Locoregional Esophageal and Gastroesophageal Junction Adenocarcinoma    Previously met with Dr. Santana Borwn who agreed he was a surgical candidate.  Previously met with Dr. Javier Moulton who will be treating him with radiation.    Began cycle 1 carboplatin/paclitaxel/nivolumab on trial on 12/29/22.  Received cycle 4 of weekly chemotherapy on trial on 1/20/23.   We held chemotherapy for week 5 because of thrombocytopenia per protocol.    Completed radiation on 2/1/23.    Restaging PET/CT personally reviewed on 3/1/23 shows interval decreased FDG avidity in esophageal tumor, no new sites of disease.  CT CAP 3/2/23 shows stable esophageal thickening.    Underwent robotic esophagectomy on  3/14/23 with Dr. Brown.  Pathology showed negative margins, ypT3 N0 tumor with 0 of 12 lymph nodes involved.  No treatment effect was noted on the tumor tissue.    Discussed that per the [id number] protocol will proceed with randomization to adjuvant nivolumab +/- ipilimumab.  Patient randomized to receive adjuvant Nivolumab, started cycle 1 at 480 mg q4 weeks 5/1/23.  Plan for twelve months per protocol.    Tolerated very well. Grade 1 pruritis.    Repeat imaging after cycle 6 shows DAVE.    Proceed with cycle 10 today at 480 mg q4 weeks.  Labs reviewed, adequate for treatment.   Timeout occurred with myself and Maria Esther SORTO. Orders signed.    Underwent dilation with Dr. Brown on 5/31/23 with temporary improvement in dysphagia. Weight stable.  Still having some dysphagia so will order esophogram.   PD-L1 CPS 30.    RTC in 4 weeks.    # Vocal cord paralysis  Post-op. S/p injection by ENT.  Stable.  Last evaluated 9/11/23 by ENT with improvement.    No further interventions planned at this time.    # HTN, HLD, DM, CKD  Following with PCP Dr. Wilson and nephrologist Dr. Oconnell.   BP elevated in clinic today. Asymptomatic. Has been off his HCTZ/lisinopril because of prior hypotension.  Cr stable.    # CAD, A fib, CHF  Following with cardiologist Dr. Nick & EP Dr. Phillip.   Continues on Xarelto.  Continue medical management.     # Rash  Grade I, very mild. Likely 2/2 immunotherapy.   Continue triamcinolone - symptoms controlled.   Continue to monitor.     Follow up: 4 weeks per research.    Patient is in agreement with the proposed treatment plan. All questions were answered to the patient's satisfaction. Pt knows to call clinic if anything is needed before the next clinic visit.    Miki Medrano MD  Hematology/Oncology  [facility]    Route Chart for Scheduling    Med Onc Chart Routing      Follow up with physician . Per research   Follow up with SAMUEL    Infusion scheduling note    Injection  scheduling note    Labs    Imaging    Pharmacy appointment    Other referrals              Treatment Plan Information   [facility] [id number] ARM C ADJUVANT NIVOLUMAB STEP 2   Miki Medrano MD   Upcoming Treatment Dates - [facility] [id number] ARM C ADJUVANT NIVOLUMAB STEP 2    1/8/2024       Chemotherapy       INV nivolumab 480 mg in INV sodium chloride 0.9 % 100 mL chemo infusion  2/5/2024       Chemotherapy       INV nivolumab 480 mg in INV sodium chloride 0.9 % 100 mL chemo infusion  3/4/2024       Chemotherapy       INV nivolumab 480 mg in INV sodium chloride 0.9 % 100 mL chemo infusion  4/1/2024       Chemotherapy       INV nivolumab 480 mg in INV sodium chloride 0.9 % 100 mL chemo infusion    Supportive Plan Information  IV FLUIDS AND ELECTROLYTES   Miki Medrano MD   Upcoming Treatment Dates - IV FLUIDS AND ELECTROLYTES    No upcoming days in selected categories.    Therapy Plan Information  Flushes  heparin, porcine (PF) 100 unit/mL injection flush 500 Units  500 Units, Intravenous, Every visit  sodium chloride 0.9% flush 10 mL  10 mL, Intravenous, Every visit

## 2024-01-09 ENCOUNTER — CLINICAL SUPPORT (OUTPATIENT)
Dept: REHABILITATION | Facility: HOSPITAL | Age: 69
End: 2024-01-09
Payer: MEDICARE

## 2024-01-09 DIAGNOSIS — R53.81 PHYSICAL DECONDITIONING: Primary | ICD-10-CM

## 2024-01-09 DIAGNOSIS — F43.29 STRESS AND ADJUSTMENT REACTION: ICD-10-CM

## 2024-01-09 PROCEDURE — 97110 THERAPEUTIC EXERCISES: CPT

## 2024-01-09 PROCEDURE — 97112 NEUROMUSCULAR REEDUCATION: CPT

## 2024-01-09 PROCEDURE — 97535 SELF CARE MNGMENT TRAINING: CPT

## 2024-01-09 NOTE — PROGRESS NOTES
[facility]  Occupational Therapy Progress and Treatment Note - Therapeutic Yoga Progam    Date: 1/9/2024  Name: Lukasz Person  Clinic Number: [id number]    Therapy Diagnosis:   Encounter Diagnoses   Name Primary?    Physical deconditioning Yes    Stress and adjustment reaction        Physician: Betina Valdovinos, *    Physician Orders: Eval and Treat   Medical Diagnosis from Referral: Chronic low back pain [M54.50, G89.29], Poor posture [R29.3], Esophageal adenocarcinoma [C15.9]  Evaluation Date: 8/22/2023  Authorization Period Expiration: 12/31/23  Plan of Care Expiration: 01/22/24  Progress Note Due: 2/5/23  Visit # / Visits authorized: 8/20     Time in:     9:30 am  Time Out:   10:15 am  Total Billable Time: 60 minutes    SUBJECTIVE     Pt reports: I am feeling good and have done the exercises.  I am still waiting on a call for PT to address my shoulder injury but I will attempt to reach them this week.  He was compliant with home exercise program given last session.   Response to previous treatment: good  Functional change: easier to get up and down from a chair    Pain:  2/10 in left shoulder due to fall      OBJECTIVE     Objective Measures updated at progress report unless specified.    Patient Specific Functional Scale:           Activity 8/22/23 11/1/23 12/6/23 1/5/24      Poor balance 5    5  6      7     2.  Carrying 20 pounds or more 7     6  7       7     3.  Challenge with stairs 5     6  6      7     4.  Endurance  4      6  7      7     5.               6.             SCORE 5.25    5.75    6.5     7.0        Total Score = Sum of activity scores / number of activities  Minimum Detectable Change (90% CII) for average score = 2 points  Minimum Detectable Change (90% CI) for single activity score = 3 points       Treatment     Lukasz received the treatments listed below:       Date 11/1/23 11/10/23 11/22/23 11/29/23 12/6/23 1/5/24 1/9/24   Therapeutic Yoga Exercises /  Neuromuscular Re-education 30 minutes  30 minutes 30 minutes  30 minutes  45 minutes  PN  30 minutes  PN 30 minutes   Seated Yoga   chair yoga:  Cat-Cow,forward bend, back bend, twist,  chair yoga:  Cat-Cow,forward bend, back bend, twist, figure 4 chair yoga:  Cat-Cow,forward bend, back bend, twist, figure 4  chair yoga:  Cat-Cow,forward bend, back bend, twist, figure 4 chair yoga:  Cat-Cow,forward bend, back bend, twist, figure 4 chair yoga:  Cat-Cow,forward bend, back bend, twist, figure 4   Quadruped     Camel wih chair in high kneel     Supine Twist x 2, knees to chest, Chair pose with block 2 x 5, Twist x 2,( eagle and wide feet),  knees to chest, happy baby flow, knee to chest flow, figure 4, bound angle Chair pose with block 2 x 5, Twist x 2,( eagle and wide feet),  knees to chest, happy baby flow, knee to chest flow, figure 4, bound angle Chair pose with block 3 x 5, Twist x 2,( eagle and wide feet),  knees to chest, happy baby flow, knee to chest flow, figure 4, bound angle, bridge x 1 with block, Modified boat pose with block 3 x 5, Twist x 2,( eagle and wide feet),  knees to chest, happy baby flow, knee to chest flow, figure 4, rolling on small ball, right QL area), for self manual muscle release-HEP Hamstring release with belt,Modified boat pose with block 3 x 5, Twist x 2,( eagle and wide feet),  knees to chest, happy baby flow, knee to chest flow, figure 4,  amstring release with belt,Modified boat pose with block 3 x 5, Twist x 2,( eagle and wide feet),  knees to chest, happy baby flow, knee to chest flow, figure 4,    Prone          standing Warrior 2, chair pose/vilcano x3,   Warrior 2, chair pose/vilcano x3,  Warrior 2, triangle with chair and block, chair pose/vilcano x3, wide straddle with hands on chair,  Warrior 2, chair pose/volcano x3, wide straddle with hands on chair, back extention at wall, twist with wall and chair, Warrior 2, chair pose/volcano x3, wide straddle with hands on chair,  standing back ext with hands on sacrum and heels pulling together,  chair pose/volcano x3, wide straddle with hands on chair, standing back ext with hands on sacrum and heels pulling together, transfer floor to stand with supervision and chair,                       Self-Care/Home Management  / Therapeutic Activities 15 minutes  15 minutes  15 minutes  15 minutes  15 minutes  15 minutes 15 minutes             Relaxation techniques DB, body scan,    DB,body scan, DB,body scan, Seated metta  practice DB,body scan,  DB,body scan, metta    Restorative  Supine with legs on chair Supine with legs on chair and bolster under hips- this caused reflux reaction Supine with legs on chair and bolster under hips- this caused reflux reaction Supine bridge and fish with blocks,  Supine with bolster unde knees Bridge and bound angle with bolsters   Activity Pacing                              Stress Management/Education  - physiology of yoga/meditation and immune health - physiology of yoga/meditation and immune health  physiology of yoga/meditation and immune health physiology of yoga/meditation and immune health - physiology of yoga/meditation and immune health - physiology of yoga/meditation and immune health - physiology of yoga/meditation and immune health                 Patient Education and Home Exercises      Education provided:   - - physiology of yoga/meditation and immune health  - Progress towards goals     Written Home Exercises Provided: yes.  Exercises were reviewed and Lukasz was able to demonstrate them prior to the end of the session.  Lukasz demonstrated good  understanding of the HEP provided. See EMR under Patient Instructions for exercises provided during therapy sessions.       Assessment      In today's session, Lukasz reviewed his HEP. We added hamstring stretch in supine with strap.  He was also taught floor to stand transfer with chair for support. Getting to the floor to practice his HEP will assist  with HEP progress. HEP focus's on strengthening, stretching and relaxation techniques.   He required moderate, less assist for verbal and neuromuscular cues. He was taught stretch and self release for right hip pain.    Lukasz is progressing well towards his goals and patient prognosis is Good.    Progress Update: Lukasz is making good progress towards his goals.  His PSFS score increased from   6.5 to 7.0 indicating increased functional ability.  He reports his balance and endurance are improving with yoga exercise. He also reports he is using prayer and breathing techniques to help with stress/immune health.   Patient will continue to benefit from skilled outpatient occupational therapy to address the deficits listed in the problem list on initial evaluation provide pt/family education and to maximize pt's level of independence in the home and community environment. Pt's spiritual, cultural and educational needs considered and pt agreeable to plan of care and goals.    Anticipated barriers to occupational therapy: none    Goals:  Short Term Goals: 6 weeks      Goal # Goal Status   1 Patient will demonstrate independence with diaphragmatic breathing in sit and supine. met   2 Pt. will identify resource for audio body scan to assist with stress management/immune health progressing   3 Patient will identify 2 new stress coping skills for stress management/immune health. met   4 Patient will identify activity pacing problems.  Patient will then implement new plan for daily activity to increase endurance for ADL's. Progressing      Long Term Goals: 12 weeks      Goal # Goal Status   1 Patient will demonstrate independence with yoga Home Exercise Program to increase strength and endurance for ADL's. Progressing   2 Patient will demonstrate independence with relaxation techniques to manage stress for immune health. Progressing   3 Patient will verbalize good understanding of stress/immune health relationship.   Progressing   4            PLAN     Plan of care Certification: 1/9/2024 to 1/22/24.    Outpatient Occupational Therapy 1 times weekly for 14 weeks to include the following interventions: Neuromuscular Re-ed, Patient Education, Self Care, Therapeutic Activities, and Therapeutic Exercise.     Joanna Kimball, OT

## 2024-01-11 ENCOUNTER — PATIENT MESSAGE (OUTPATIENT)
Dept: INTERNAL MEDICINE | Facility: CLINIC | Age: 69
End: 2024-01-11
Payer: MEDICARE

## 2024-01-12 ENCOUNTER — TELEPHONE (OUTPATIENT)
Dept: INTERNAL MEDICINE | Facility: CLINIC | Age: 69
End: 2024-01-12
Payer: MEDICARE

## 2024-01-13 DIAGNOSIS — N13.8 BPH WITH OBSTRUCTION/LOWER URINARY TRACT SYMPTOMS: ICD-10-CM

## 2024-01-13 DIAGNOSIS — N40.1 BPH WITH OBSTRUCTION/LOWER URINARY TRACT SYMPTOMS: ICD-10-CM

## 2024-01-16 NOTE — PROGRESS NOTES
Oncology Nutrition Assessment for Medical Nutrition Therapy  Follow-up Visit    Lukasz Person   1955    Referring Provider: No ref. provider found      Reason for Visit: nutrition counseling and education    The patient location is: [place]  The chief complaint leading to consultation is: nutrition follow-up    Visit type: audiovisual    Face to Face time with patient: 13 minutes  20 minutes of total time spent on the encounter, which includes face to face time and non-face to face time preparing to see the patient (eg, review of tests), Obtaining and/or reviewing separately obtained history, Documenting clinical information in the electronic or other health record, Independently interpreting results (not separately reported) and communicating results to the patient/family/caregiver, or Care coordination (not separately reported).     Each patient to whom he or she provides medical services by telemedicine is:  (1) informed of the relationship between the physician and patient and the respective role of any other health care provider with respect to management of the patient; and (2) notified that he or she may decline to receive medical services by telemedicine and may withdraw from such care at any time.    PMHx:   Past Medical History:   Diagnosis Date    Anticoagulant long-term use     Cancer     Diabetes mellitus     Onset late 50s/early 60s    Hyperlipidemia     Hypertension     Onset late 50s/early 60s    Kidney stones     Sleep apnea     since 2006       Nutrition Assessment    This is a 68 y.o.male with a medical diagnosis of esophageal adenocarcinoma s/p neoadjuvant chemoradiation and now s/p esophagectomy 3/2023. He is known to me from previous appointments. He is now s/p cycle 10 adjuvant Opdivo. He has maintained his weight. He reports he continues to eat well. Reports occasional issues with some food feeling caught but it does not last long. He reports increased gas lately but also eating more beans. He  is no longer supplementing with protein bars.     Weight: 85.9 kg (189 lb 4.2 oz) - 1/8 clinic weight  Height: 6' (182.9 cm)  BMI: 25.7    Usual BW: 260-265lb  Weight Change: 30lb loss over 3 months (15lb of which were lost after surgery); another 20lb loss over 1.5 months    Allergies: Patient has no known allergies.    Current Medications:  Current Outpatient Medications:     allopurinoL (ZYLOPRIM) 100 MG tablet, TAKE 1 TABLET BY MOUTH EVERY DAY, Disp: 30 tablet, Rfl: 10    blood sugar diagnostic (ACCU-CHEK ANTONELLA PLUS TEST STRP) Strp, Use to test CBG four times daily E11.65 (Patient not taking: Reported on 1/8/2024), Disp: 400 strip, Rfl: 3    blood-glucose meter kit, Checks blood sugars 1x/daily. (Patient not taking: Reported on 1/8/2024), Disp: 1 each, Rfl: 12    citric acid-potassium citrate (POLYCITRA) 1,100-334 mg/5 mL solution, Take 10 mLs (20 mEq total) by mouth 2 (two) times a day. (Patient not taking: Reported on 1/8/2024), Disp: 600 mL, Rfl: 5    hydroCHLOROthiazide (HYDRODIURIL) 25 MG tablet, Take 1 tablet (25 mg total) by mouth once daily., Disp: 30 tablet, Rfl: 11    ipratropium (ATROVENT) 21 mcg (0.03 %) nasal spray, 2 sprays by Each Nostril route 2 (two) times daily as needed., Disp: 30 mL, Rfl: 0    lancets (ACCU-CHEK SOFTCLIX LANCETS) Misc, USE 1 EVERY DAY OR AS DIRECTED (Patient not taking: Reported on 1/8/2024), Disp: 100 each, Rfl: 3    metoprolol succinate (TOPROL-XL) 25 MG 24 hr tablet, Take 0.5 tablets (12.5 mg total) by mouth once daily., Disp: 15 tablet, Rfl: 11    nitroGLYCERIN (NITROSTAT) 0.4 MG SL tablet, Place 1 tablet (0.4 mg total) under the tongue every 5 (five) minutes as needed for Chest pain. If you need a third tablet, call 911, Disp: 100 tablet, Rfl: 3    omeprazole (PRILOSEC) 40 MG capsule, TAKE 1 CAPSULE(40 MG) BY MOUTH EVERY DAY, Disp: 90 capsule, Rfl: 3    rivaroxaban (XARELTO) 20 mg Tab, Take 1 tablet (20 mg total) by mouth daily with dinner or evening meal., Disp: 90  tablet, Rfl: 3    rosuvastatin (CRESTOR) 20 MG tablet, Take 1 tablet (20 mg total) by mouth every evening., Disp: 90 tablet, Rfl: 3    tamsulosin (FLOMAX) 0.4 mg Cap, TAKE 1 CAPSULE(0.4 MG) BY MOUTH EVERY DAY, Disp: 30 capsule, Rfl: 2    testosterone (ANDROGEL) 20.25 mg/1.25 gram (1.62 %) GlPm, Apply 4 pumps to shoulders daily, Disp: 2 each, Rfl: 5    triamcinolone acetonide 0.1% (KENALOG) 0.1 % cream, Apply topically 2 (two) times daily., Disp: 45 g, Rfl: 1    Food/medication interactions noted: none    Vitamins/Supplements: none    Labs: Reviewed    Nutrition Diagnosis    Problem: moderate malnutrition  Etiology (related to): appetite loss and early satiety  Signs/Symptoms (as evidenced by): reports of inadequate PO intake, weight loss, and both fat & muscle wasting present  Status: Improving    Nutrition Intervention    Nutrition Prescription   2148 kcals (25kcal/kg)  86g protein (1g/kg)   2148mL fluid (25mL/kg)    Recommendations:  Continue eating small frequent meals/snacks - at least 6/day  Make meals/snacks high in calories and protein - examples discussed  Continue to supplement with protein bar or drink daily  Continue to drink at least 64oz fluid/day    Materials Provided/Reviewed: none    Nutrition Monitoring and Evaluation    Monitor: diet education needs, energy intake, and weight status    Goals: weight maintenance    Follow up: in 6 months    Communication to referring provider/care team: note available in chart    Counseling time: 13 minutes    Carmen Clark, MS, RD, LDN

## 2024-01-17 ENCOUNTER — CLINICAL SUPPORT (OUTPATIENT)
Dept: HEMATOLOGY/ONCOLOGY | Facility: CLINIC | Age: 69
End: 2024-01-17
Payer: MEDICARE

## 2024-01-17 DIAGNOSIS — Z71.3 NUTRITIONAL COUNSELING: Primary | ICD-10-CM

## 2024-01-17 DIAGNOSIS — C15.9 ESOPHAGEAL ADENOCARCINOMA: ICD-10-CM

## 2024-01-17 PROCEDURE — 97803 MED NUTRITION INDIV SUBSEQ: CPT | Mod: 95,,, | Performed by: DIETITIAN, REGISTERED

## 2024-01-18 RX ORDER — TAMSULOSIN HYDROCHLORIDE 0.4 MG/1
0.4 CAPSULE ORAL
Qty: 30 CAPSULE | Refills: 2 | Status: SHIPPED | OUTPATIENT
Start: 2024-01-18 | End: 2024-04-24

## 2024-01-19 ENCOUNTER — PATIENT MESSAGE (OUTPATIENT)
Dept: CARDIOLOGY | Facility: CLINIC | Age: 69
End: 2024-01-19
Payer: MEDICARE

## 2024-01-22 ENCOUNTER — OFFICE VISIT (OUTPATIENT)
Dept: INTERNAL MEDICINE | Facility: CLINIC | Age: 69
End: 2024-01-22
Payer: MEDICARE

## 2024-01-22 ENCOUNTER — PATIENT MESSAGE (OUTPATIENT)
Dept: CARDIOLOGY | Facility: CLINIC | Age: 69
End: 2024-01-22
Payer: MEDICARE

## 2024-01-22 ENCOUNTER — ANTI-COAG VISIT (OUTPATIENT)
Dept: CARDIOLOGY | Facility: CLINIC | Age: 69
End: 2024-01-22
Payer: MEDICARE

## 2024-01-22 VITALS
BODY MASS INDEX: 26.34 KG/M2 | DIASTOLIC BLOOD PRESSURE: 66 MMHG | WEIGHT: 194.44 LBS | TEMPERATURE: 98 F | RESPIRATION RATE: 18 BRPM | SYSTOLIC BLOOD PRESSURE: 128 MMHG | HEART RATE: 52 BPM | OXYGEN SATURATION: 100 % | HEIGHT: 72 IN

## 2024-01-22 DIAGNOSIS — I10 ESSENTIAL HYPERTENSION: Chronic | ICD-10-CM

## 2024-01-22 DIAGNOSIS — I70.0 AORTIC ATHEROSCLEROSIS: Chronic | ICD-10-CM

## 2024-01-22 DIAGNOSIS — T45.1X5A CHEMOTHERAPY-INDUCED NEUROPATHY: ICD-10-CM

## 2024-01-22 DIAGNOSIS — E78.5 HYPERLIPIDEMIA ASSOCIATED WITH TYPE 2 DIABETES MELLITUS: Chronic | ICD-10-CM

## 2024-01-22 DIAGNOSIS — I48.0 PAROXYSMAL ATRIAL FIBRILLATION: Chronic | ICD-10-CM

## 2024-01-22 DIAGNOSIS — N18.31 TYPE 2 DIABETES MELLITUS WITH STAGE 3A CHRONIC KIDNEY DISEASE, WITHOUT LONG-TERM CURRENT USE OF INSULIN: ICD-10-CM

## 2024-01-22 DIAGNOSIS — I25.10 CORONARY ARTERY DISEASE INVOLVING NATIVE CORONARY ARTERY OF NATIVE HEART WITHOUT ANGINA PECTORIS: Chronic | ICD-10-CM

## 2024-01-22 DIAGNOSIS — G62.0 CHEMOTHERAPY-INDUCED NEUROPATHY: ICD-10-CM

## 2024-01-22 DIAGNOSIS — E21.0 HYPERPARATHYROIDISM, PRIMARY: ICD-10-CM

## 2024-01-22 DIAGNOSIS — E11.29 TYPE 2 DIABETES MELLITUS WITH OTHER DIABETIC KIDNEY COMPLICATION, WITHOUT LONG-TERM CURRENT USE OF INSULIN: Primary | Chronic | ICD-10-CM

## 2024-01-22 DIAGNOSIS — E11.22 CKD STAGE 3 DUE TO TYPE 2 DIABETES MELLITUS: ICD-10-CM

## 2024-01-22 DIAGNOSIS — L98.9 SKIN LESION: ICD-10-CM

## 2024-01-22 DIAGNOSIS — I48.0 PAROXYSMAL ATRIAL FIBRILLATION: ICD-10-CM

## 2024-01-22 DIAGNOSIS — N18.30 CKD STAGE 3 DUE TO TYPE 2 DIABETES MELLITUS: ICD-10-CM

## 2024-01-22 DIAGNOSIS — S46.012A TRAUMATIC TEAR OF LEFT ROTATOR CUFF, UNSPECIFIED TEAR EXTENT, INITIAL ENCOUNTER: ICD-10-CM

## 2024-01-22 DIAGNOSIS — Z79.01 LONG TERM (CURRENT) USE OF ANTICOAGULANTS: Primary | ICD-10-CM

## 2024-01-22 DIAGNOSIS — E11.22 TYPE 2 DIABETES MELLITUS WITH STAGE 3A CHRONIC KIDNEY DISEASE, WITHOUT LONG-TERM CURRENT USE OF INSULIN: ICD-10-CM

## 2024-01-22 DIAGNOSIS — E11.69 HYPERLIPIDEMIA ASSOCIATED WITH TYPE 2 DIABETES MELLITUS: Chronic | ICD-10-CM

## 2024-01-22 DIAGNOSIS — I48.0 PAROXYSMAL ATRIAL FIBRILLATION: Primary | Chronic | ICD-10-CM

## 2024-01-22 PROCEDURE — 99999 PR PBB SHADOW E&M-EST. PATIENT-LVL V: CPT | Mod: PBBFAC,,, | Performed by: INTERNAL MEDICINE

## 2024-01-22 PROCEDURE — 99215 OFFICE O/P EST HI 40 MIN: CPT | Mod: PBBFAC,PO | Performed by: INTERNAL MEDICINE

## 2024-01-22 PROCEDURE — 99214 OFFICE O/P EST MOD 30 MIN: CPT | Mod: S$PBB,,, | Performed by: INTERNAL MEDICINE

## 2024-01-22 RX ORDER — WARFARIN 4 MG/1
4 TABLET ORAL DAILY
Qty: 45 TABLET | Refills: 6 | Status: SHIPPED | OUTPATIENT
Start: 2024-01-22 | End: 2024-04-02 | Stop reason: SDUPTHER

## 2024-01-22 NOTE — PROGRESS NOTES
Lukasz Person is referred to the [facility] to start warfarin after being on Xarelto and no longer being able to afford due to cost. PMH includes Non-obstructive CAD, 50% PDA, Mildly reduced LV systolic function, Diabetes mellitus since his late 50s/early 60s, Hypertension since his late 50s/early 60s, Mixed hyperlipidemia, Paroxysmal atrial fibrillation in setting of pyelonephritis 4/2022, KUMAR - 2006, Lithotripsy, Parathyroidectomy 2017, 7 pack year h/o smoking, quit in 1972, Esophageal cancer 3/2023.    Patient stated he had 11 tablets on 1/19 and will have him finish his tablets before switching to warfarin. Patient is to continue Xaretlo through 1/29 and will overlap with warfarin for 4 days (1/26-1/29). Will check INR on 1/30.

## 2024-01-22 NOTE — PROGRESS NOTES
Subjective:       Patient ID: Lukasz Person is a 68 y.o. male.    Chief Complaint: Follow-up    HPI      68-year-old male with paroxysmal atrial fibrillation, aortic atherosclerosis, CKD stage, chemotherapy-induced neuropathy, hyperparathyroidism here for follow-up.      Diabetes-He has not been checking his BG regularly.  Lab Results   Component Value Date    HGBA1C 6.2 (H) 01/08/2024    HGBA1C 6.2 (H) 05/29/2023    HGBA1C 6.6 (H) 03/14/2023     Lab Results   Component Value Date    LDLCALC 53.6 (L) 11/14/2023    CREATININE 1.1 01/08/2024    CREATININE 1.0 01/08/2024     HTN -  Patient's co morbidities include: diabetes. Patient is currently on Toprol-XL 25 mg, HCTZ 25 mg. He does not check his BP at home. Side effects of medications note: none. Denies headaches, blurred vision, chest pain, shortness of breath, nausea.    He has skin spots and saw Dr. Ellsworth for this.  He was told the spots are SKs.  He has a lesion on his left index finger.    He had light flashes in his eyes and has seen an ophthalmologist.      HLD - Patient is currently on Crestor 20 mg.  His last lipid panel was   Cholesterol   Date Value Ref Range Status   11/14/2023 120 120 - 199 mg/dL Final     Comment:     The National Cholesterol Education Program (NCEP) has set the  following guidelines (reference ranges) for Cholesterol:  Optimal.....................<200 mg/dL  Borderline High.............200-239 mg/dL  High........................> or = 240 mg/dL       Triglycerides   Date Value Ref Range Status   11/14/2023 67 30 - 150 mg/dL Final     Comment:     The National Cholesterol Education Program (NCEP) has set the  following guidelines (reference values) for triglycerides:  Normal......................<150 mg/dL  Borderline High.............150-199 mg/dL  High........................200-499 mg/dL       HDL   Date Value Ref Range Status   11/14/2023 53 40 - 75 mg/dL Final     Comment:     The National Cholesterol Education Program (NCEP) has set  the  following guidelines (reference values) for HDL Cholesterol:  Low...............<40 mg/dL  Optimal...........>60 mg/dL       LDL Cholesterol   Date Value Ref Range Status   11/14/2023 53.6 (L) 63.0 - 159.0 mg/dL Final     Comment:     The National Cholesterol Education Program (NCEP) has set the  following guidelines (reference values) for LDL Cholesterol:  Optimal.......................<130 mg/dL  Borderline High...............130-159 mg/dL  High..........................160-189 mg/dL  Very High.....................>190 mg/dL     .  Side effects of the medication: none.    Review of Systems      Objective:      Physical Exam  Vitals reviewed.   Constitutional:       Appearance: He is well-developed.   HENT:      Head: Normocephalic and atraumatic.      Mouth/Throat:      Pharynx: No oropharyngeal exudate.   Eyes:      General: No scleral icterus.        Right eye: No discharge.         Left eye: No discharge.      Pupils: Pupils are equal, round, and reactive to light.   Neck:      Thyroid: No thyromegaly.      Trachea: No tracheal deviation.   Cardiovascular:      Rate and Rhythm: Normal rate and regular rhythm.      Heart sounds: Normal heart sounds. No murmur heard.     No friction rub. No gallop.   Pulmonary:      Effort: Pulmonary effort is normal. No respiratory distress.      Breath sounds: Normal breath sounds. No wheezing or rales.   Chest:      Chest wall: No tenderness.   Abdominal:      General: Bowel sounds are normal. There is no distension.      Palpations: Abdomen is soft. There is no mass.      Tenderness: There is no abdominal tenderness. There is no guarding or rebound.   Musculoskeletal:         General: No tenderness. Normal range of motion.      Cervical back: Normal range of motion and neck supple.   Skin:     General: Skin is warm and dry.      Coloration: Skin is not pale.      Findings: No erythema or rash.   Neurological:      Mental Status: He is alert and oriented to person, place,  and time.   Psychiatric:         Behavior: Behavior normal.         Assessment:       1. Type 2 diabetes mellitus with other diabetic kidney complication, without long-term current use of insulin    2. Essential hypertension    3. Hyperlipidemia associated with type 2 diabetes mellitus    4. Paroxysmal atrial fibrillation  - Ambulatory referral/consult to Pharmacy Assistance; Future    5. Aortic atherosclerosis    6. Coronary artery disease involving native coronary artery of native heart without angina pectoris    7. CKD stage 3 due to type 2 diabetes mellitus    8. Type 2 diabetes mellitus with stage 3a chronic kidney disease, without long-term current use of insulin    9. Chemotherapy-induced neuropathy    10. Hyperparathyroidism, primary    11. Traumatic tear of left rotator cuff, unspecified tear extent, initial encounter  - Ambulatory referral/consult to Physical/Occupational Therapy; Future    12. Skin lesion  - Ambulatory referral/consult to Dermatology; Future      Plan:       1/8. Diet and exercise controlled.  2. Continue Toprol-XL 25 mg, HCTZ 25 mg,  3. Continue Crestor 20 mg p.o.  4.  Continue Toprol-XL 25 mg.    5.  Continue Crestor 20 mg.  6.  Continue Crestor 20 mg p.o.  7.  Monitor   9.   Monitor.  10.  Monitor.  11.   Refer to physical therapy   12.  Refer to Dermatology.      Return to clinic in 6 months or sooner if needed.

## 2024-01-23 ENCOUNTER — PATIENT MESSAGE (OUTPATIENT)
Dept: INTERNAL MEDICINE | Facility: CLINIC | Age: 69
End: 2024-01-23
Payer: MEDICARE

## 2024-01-23 ENCOUNTER — CLINICAL SUPPORT (OUTPATIENT)
Dept: REHABILITATION | Facility: HOSPITAL | Age: 69
End: 2024-01-23
Payer: MEDICARE

## 2024-01-23 DIAGNOSIS — F43.29 STRESS AND ADJUSTMENT REACTION: ICD-10-CM

## 2024-01-23 DIAGNOSIS — R53.81 PHYSICAL DECONDITIONING: Primary | ICD-10-CM

## 2024-01-23 PROCEDURE — 97112 NEUROMUSCULAR REEDUCATION: CPT

## 2024-01-23 PROCEDURE — 97110 THERAPEUTIC EXERCISES: CPT

## 2024-01-23 PROCEDURE — 97535 SELF CARE MNGMENT TRAINING: CPT

## 2024-01-23 NOTE — PROGRESS NOTES
[facility]  Occupational Therapy Treatment Note - Therapeutic Yoga Progam    Date: 1/9/2024  Name: Lukasz Person  Clinic Number: [id number]    Therapy Diagnosis:   Encounter Diagnoses   Name Primary?    Physical deconditioning Yes    Stress and adjustment reaction        Physician: Betina Valdovinos, *    Physician Orders: Eval and Treat   Medical Diagnosis from Referral: Chronic low back pain [M54.50, G89.29], Poor posture [R29.3], Esophageal adenocarcinoma [C15.9]  Evaluation Date: 8/22/2023  Authorization Period Expiration: 1/31/24  Plan of Care Expiration: 05/22/24  Progress Note Due: 2/22/24  Visit # / Visits authorized: 9/20     Time in:     9:30 am  Time Out:   10:15 am  Total Billable Time: 60 minutes    SUBJECTIVE     Pt reports: I am feeling good and have done the exercises.  I scheduled PT to address my shoulder injury.  He was compliant with home exercise program given last session.   Response to previous treatment: good  Functional change: easier to get up and down from a chair    Pain:  2/10 in left shoulder due to fall      OBJECTIVE     Objective Measures updated at progress report unless specified.    Patient Specific Functional Scale:           Activity 8/22/23 11/1/23 12/6/23 1/5/24      Poor balance 5    5  6      7     2.  Carrying 20 pounds or more 7     6  7       7     3.  Challenge with stairs 5     6  6      7     4.  Endurance  4      6  7      7     5.               6.             SCORE 5.25    5.75    6.5     7.0        Total Score = Sum of activity scores / number of activities  Minimum Detectable Change (90% CII) for average score = 2 points  Minimum Detectable Change (90% CI) for single activity score = 3 points       Treatment     Lukasz received the treatments listed below:       Date 11/1/23 11/10/23 11/22/23 11/29/23 12/6/23 1/5/24 1/9/24 1/23/24   Therapeutic Yoga Exercises / Neuromuscular Re-education 30 minutes  30 minutes 30 minutes  30  minutes  45 minutes  PN  30 minutes  PN 30 minutes 30 minutes   Seated Yoga   chair yoga:  Cat-Cow,forward bend, back bend, twist,  chair yoga:  Cat-Cow,forward bend, back bend, twist, figure 4 chair yoga:  Cat-Cow,forward bend, back bend, twist, figure 4  chair yoga:  Cat-Cow,forward bend, back bend, twist, figure 4 chair yoga:  Cat-Cow,forward bend, back bend, twist, figure 4 chair yoga:  Cat-Cow,forward bend, back bend, twist, figure 4    Quadruped     Camel wih chair in high kneel      Supine Twist x 2, knees to chest, Chair pose with block 2 x 5, Twist x 2,( eagle and wide feet),  knees to chest, happy baby flow, knee to chest flow, figure 4, bound angle Chair pose with block 2 x 5, Twist x 2,( eagle and wide feet),  knees to chest, happy baby flow, knee to chest flow, figure 4, bound angle Chair pose with block 3 x 5, Twist x 2,( eagle and wide feet),  knees to chest, happy baby flow, knee to chest flow, figure 4, bound angle, bridge x 1 with block, Modified boat pose with block 3 x 5, Twist x 2,( eagle and wide feet),  knees to chest, happy baby flow, knee to chest flow, figure 4, rolling on small ball, right QL area), for self manual muscle release-HEP Hamstring release with belt,Modified boat pose with block 3 x 5, Twist x 2,( eagle and wide feet),  knees to chest, happy baby flow, knee to chest flow, figure 4,  amstring release with belt,Modified boat pose with block 3 x 5, Twist x 2,( eagle and wide feet),  knees to chest, happy baby flow, knee to chest flow, figure 4,  hamstring release with belt,Modified boat pose with block and head lifted, 3 x 5, Twist x 2,( eagle and wide feet),  knees to chest, happy baby flow, knee to chest flow, figure 4, leg lifts, bridge x 2,   Prone        sphinx   standing Warrior 2, chair pose/vilcano x3,   Warrior 2, chair pose/vilcano x3,  Warrior 2, triangle with chair and block, chair pose/vilcano x3, wide straddle with hands on chair,  Warrior 2, chair pose/volcano x3,  wide straddle with hands on chair, back extention at wall, twist with wall and chair, Warrior 2, chair pose/volcano x3, wide straddle with hands on chair, standing back ext with hands on sacrum and heels pulling together,  chair pose/volcano x3, wide straddle with hands on chair, standing back ext with hands on sacrum and heels pulling together, transfer floor to stand with supervision and chair, hair pose/volcano x3, wide straddle with hands on chair, standing back ext with hands on sacrum and heels pulling together, transfer floor to stand without chair,                         Self-Care/Home Management  / Therapeutic Activities 15 minutes  15 minutes  15 minutes  15 minutes  15 minutes  15 minutes 15 minutes 15 minutes              Relaxation techniques DB, body scan,    DB,body scan, DB,body scan, Seated metta  practice DB,body scan,  DB,body scan, metta  DB,body scan, metta    Restorative  Supine with legs on chair Supine with legs on chair and bolster under hips- this caused reflux reaction Supine with legs on chair and bolster under hips- this caused reflux reaction Supine bridge and fish with blocks,  Supine with bolster unde knees Bridge and bound angle with bolsters Bridge and bound angle with bolsters   Activity Pacing                                 Stress Management/Education  - physiology of yoga/meditation and immune health - physiology of yoga/meditation and immune health  physiology of yoga/meditation and immune health physiology of yoga/meditation and immune health - physiology of yoga/meditation and immune health - physiology of yoga/meditation and immune health - physiology of yoga/meditation and immune health physiology of yoga/meditation and immune health                  Patient Education and Home Exercises      Education provided:   - - physiology of yoga/meditation and immune health  - Progress towards goals     Written Home Exercises Provided: yes.  Exercises were reviewed and Lukasz was  able to demonstrate them prior to the end of the session.  Lukasz demonstrated good  understanding of the HEP provided. See EMR under Patient Instructions for exercises provided during therapy sessions.       Assessment      In today's session, Lukasz reviewed his HEP. We continued hamstring stretch in supine with strap.  He reports he had been practicing floor to stand transfers with chait at home.  We practiced this today without chair and he is independent.  Getting to the floor to practice his HEP will assist with HEP progress. HEP focus's on strengthening, stretching and relaxation techniques.   He required moderate, less assist for verbal and neuromuscular cues. He was taught stretch and self release for right hip pain.    Lukasz is progressing well towards his goals and patient prognosis is Good.    Progress Update: Lukasz is making good progress towards his goals.  His PSFS score increased from   6.5 to 7.0 indicating increased functional ability.  He reports his balance and endurance are improving with yoga exercise. He also reports he is using prayer and breathing techniques to help with stress/immune health.   Patient will continue to benefit from skilled outpatient occupational therapy to address the deficits listed in the problem list on initial evaluation provide pt/family education and to maximize pt's level of independence in the home and community environment. Pt's spiritual, cultural and educational needs considered and pt agreeable to plan of care and goals.    Anticipated barriers to occupational therapy: none    Goals:  Short Term Goals: 6 weeks      Goal # Goal Status   1 Patient will demonstrate independence with diaphragmatic breathing in sit and supine. met   2 Pt. will identify resource for audio body scan to assist with stress management/immune health progressing   3 Patient will identify 2 new stress coping skills for stress management/immune health. met   4 Patient will identify  activity pacing problems.  Patient will then implement new plan for daily activity to increase endurance for ADL's. Progressing      Long Term Goals: 12 weeks      Goal # Goal Status   1 Patient will demonstrate independence with yoga Home Exercise Program to increase strength and endurance for ADL's. Progressing   2 Patient will demonstrate independence with relaxation techniques to manage stress for immune health. Progressing   3 Patient will verbalize good understanding of stress/immune health relationship.  Progressing   4            PLAN     Plan of care Certification: 1/9/2024 to 1/22/24.    Outpatient Occupational Therapy 1 times weekly for 14 weeks to include the following interventions: Neuromuscular Re-ed, Patient Education, Self Care, Therapeutic Activities, and Therapeutic Exercise.     Joanna Kimball, OT

## 2024-01-23 NOTE — PLAN OF CARE
Outpatient Therapy Updated Plan of Care     Visit Date: 1/23/2024  Name: Lukasz Person  Clinic Number: [id number]    Therapy Diagnosis:   Encounter Diagnoses   Name Primary?    Physical deconditioning Yes    Stress and adjustment reaction      Physician: Betina Valdovinos, *    Physician Orders:Physician Orders: Eval and Treat   Medical Diagnosis from Referral: Chronic low back pain [M54.50, G89.29], Poor posture [R29.3], Esophageal adenocarcinoma [C15.9]  Evaluation Date: 8/22/2023  Authorization Period Expiration: 12/31/24  Plan of Care Expiration: 05/22/24  Progress Note Due: 2/22/24  Visit # / Visits authorized: 9/20       Total Visits Received: 9      Current Certification Period:  /31/24 to 12/31/24  Precautions:  standard and cancer      Subjective     Update:    Pt reports: I am feeling good and have done the exercises.  I scheduled PT to address my shoulder injury.  He was compliant with home exercise program given last session.   Response to previous treatment: good  Functional change: easier to get up and down from a chair     Pain:  2/10 in left shoulder due to fall       Objective     Patient Specific Functional Scale:   :     Activity 8/22/23 11/1/23 12/6/23 1/5/24      Poor balance 5    5  6      7     2.  Carrying 20 pounds or more 7     6  7       7     3.  Challenge with stairs 5     6  6      7     4.  Endurance  4      6  7      7     5.               6.             SCORE 5.25    5.75    6.5     7.0        Total Score = Sum of activity scores / number of activities  Minimum Detectable Change (90% CII) for average score = 2 points  Minimum Detectable Change (90% CI) for single activity score = 3 points      Assessment     Update:  Progress Update: Lukasz is making good progress towards his goals.  His PSFS score increased from   6.5 to 7.0 indicating increased functional ability.  He reports his balance and endurance are improving with yoga exercise. He also reports he is using prayer and  breathing techniques to help with stress/immune health.   Patient will continue to benefit from skilled outpatient occupational therapy to address the deficits listed in the problem list on initial evaluation provide pt/family education and to maximize pt's level of independence in the home and community environment. Pt's spiritual, cultural and educational needs considered and pt agreeable to plan of care and goals.          GOALS:  Short Term Goals: 6 weeks      Goal # Goal Status   1 Patient will demonstrate independence with diaphragmatic breathing in sit and supine. met   2 Pt. will identify resource for audio body scan to assist with stress management/immune health progressing   3 Patient will identify 2 new stress coping skills for stress management/immune health. met   4 Patient will identify activity pacing problems.  Patient will then implement new plan for daily activity to increase endurance for ADL's. Progressing      Long Term Goals: 12 weeks      Goal # Goal Status   1 Patient will demonstrate independence with yoga Home Exercise Program to increase strength and endurance for ADL's. Progressing   2 Patient will demonstrate independence with relaxation techniques to manage stress for immune health. Progressing   3 Patient will verbalize good understanding of stress/immune health relationship.  Progressing   4                Plan     Updated Certification Period: 1/23/2024 to 5/22/24  Recommended Treatment Plan: 1 times per week for 12 weeks: Neuromuscular Re-ed, Patient Education, Self Care, Therapeutic Activities, and Therapeutic Exercise    Joanna Kimball OT  1/23/2024      I CERTIFY THE NEED FOR THESE SERVICES FURNISHED UNDER THIS PLAN OF TREATMENT AND WHILE UNDER MY CARE    Physician's comments:        Physician's Signature: ___________________________________________________

## 2024-01-24 NOTE — PROGRESS NOTES
Spoke to patient regarding enrollment into the Coumadin Clinic. Patient verified that he has a blue 4 mg warfarin tablet. Patient advised that he should overlap Xarelto + 4 mg of warfarin, 1/26 - 1/29. INR scheduled for 01.30.24.    Patient educated on diet and when to call. Questions and concerns addressed at this time, and he/she expressed understanding. Education sheets sent via patient portal to reinforce teaching.    Patient will report intentions concerning foods with vitamin K when he gets a better idea of how he will eat them. He will drink cranberry juice daily, and EtOH  (3) times per week. The risks associated with consuming EtOH, while taking warfarin, and the importance of a consistent diet discussed.

## 2024-01-25 ENCOUNTER — CLINICAL SUPPORT (OUTPATIENT)
Dept: REHABILITATION | Facility: HOSPITAL | Age: 69
End: 2024-01-25
Payer: MEDICARE

## 2024-01-25 DIAGNOSIS — S46.012A TRAUMATIC TEAR OF LEFT ROTATOR CUFF, UNSPECIFIED TEAR EXTENT, INITIAL ENCOUNTER: ICD-10-CM

## 2024-01-25 PROCEDURE — 97165 OT EVAL LOW COMPLEX 30 MIN: CPT

## 2024-01-25 PROCEDURE — 97140 MANUAL THERAPY 1/> REGIONS: CPT

## 2024-01-25 PROCEDURE — 97110 THERAPEUTIC EXERCISES: CPT

## 2024-01-25 NOTE — PLAN OF CARE
[facility] Occupational Therapy  Initial Evaluation       Name: Lukasz Person  Clinic Number: [id number]    Therapy Diagnosis:   Encounter Diagnosis   Name Primary?    Traumatic tear of left rotator cuff, unspecified tear extent, initial encounter      Physician: Kirk Wilson MD    Physician Orders:  eval and treat   Medical Diagnosis: S46.012A (ICD-10-CM) - Traumatic tear of left rotator cuff, unspecified tear extent, initial encounter   Surgical Procedure/ Date :reports no shoulder sx   Evaluation Date: 1/25/2024  Insurance:Medicare   Insurance Authorization period Expiration: 1/21/25   Plan of Care Certification Period: 4/30/24     Visit # / Visits Authortized: 1 / 20   Time In:9:30 am   Time Out: 10;30 am   Total Billable Time: 60 minutes    Precautions: Standard, needs  to have head elevated in supine due to esophageal cancer and reconstruction     Subjective     Involved Side:  right   Dominant Side: Right  Date of Onset:  1/15/23   Mechanism of Injury: reports that he tripped over concrete landscape area   History of Current Condition: reports that shoulder pain is fair he mainly sleeps on his back      Imaging: Impression:     Rotator cuff tendinosis with superior humeral chondral osteophyte producing high-grade partial-thickness articular sided supraspinatus tendon tear (approximately 5 x 5 mm.  Additional small bursal sided partial-thickness insertional tear.     Glenohumeral osteoarthritis with SLAP tear and subchondral cyst formation versus intraosseous extension of paralabral cyst.     AC osteoarthritis with edema and possible subchondral fracture of the distal clavicle.     Subacromial/subdeltoid bursitis.        Previous Therapy:  Reports he was doing PT for core strengthening , he stopped then went back to it recently     Patient's Goals for Therapy: increased range of motion     Pain:  Functional Pain Scale Rating 0-10:   2/10 on average  2/10 at best  5/10 at worst   Locationright:  shoulder   Description: Dull  Aggravating Factors: Flexing  Easing Factors: relaxation    Occupation:  retired   Working presently: unemployed  Duties:  reports he is retired     Functional Limitations/Social History:    Previous functional status includes: Independent with all ADLs. Does do yoga and meditation regulary     Current FunctionalStatus   Home/Living environment : lives alone        Limitation of Functional Status as follows:   ADLs/IADLs:     - Feeding: no issues     - Bathing: no issues     - Dressing/Grooming: minimal pain with over head dressing     - Driving:  no problems      Leisure:  exercises       Past Medical History/Physical Systems Review:   Lukasz Person  has a past medical history of Anticoagulant long-term use, Cancer, Diabetes mellitus, Hyperlipidemia, Hypertension, Kidney stones, and Sleep apnea.    Lukasz Person  has a past surgical history that includes Lithotripsy; Parathyroidectomy (1/1/2-107); Left heart catheterization (Left, 9/30/2020); Coronary angiography (N/A, 9/30/2020); Cystoscopy (N/A, 2/17/2022); Ureteroscopic removal of ureteric calculus (Left, 2/25/2022); Laser lithotripsy (Left, 2/25/2022); Cystoscopy w/ ureteral stent placement (N/A, 2/25/2022); Pyeloscopy (Left, 2/25/2022); Ureteroscopy (Left, 2/25/2022); Treatment of cardiac arrhythmia (N/A, 4/7/2022); Transesophageal echocardiography (N/A, 4/7/2022); Esophagogastroduodenoscopy (N/A, 11/17/2022); Endoscopic ultrasound of upper gastrointestinal tract (N/A, 12/7/2022); Robot-assisted surgical removal of esophagus using da Pool Xi (N/A, 3/14/2023); robotic jejunostomy (N/A, 3/14/2023); Vocal cord injection (Left, 3/17/2023); Esophagogastroduodenoscopy (N/A, 5/31/2023); and Colonoscopy (N/A, 10/26/2023).    Lukasz has a current medication list which includes the following prescription(s): allopurinol, blood sugar diagnostic, blood-glucose meter, citric acid-potassium citrate, hydrochlorothiazide, ipratropium, lancets,  metoprolol succinate, nitroglycerin, omeprazole, rosuvastatin, tamsulosin, testosterone, triamcinolone acetonide 0.1%, and warfarin.    Review of patient's allergies indicates:  No Known Allergies       Objective     Active Range of Motion: Measured in degrees    Shoulder Right Left    Flexion 155 130   Abduction 130 130   ER at 0 80 60   ER at 90 60  60    IR 60 60       Shoulder Right   Shoulder flexion: 4/5   Shoulder Abduction: 4/5   Shoulder ER 4/5   Shoulder IR 4/5   Lower Trap 5/5   Middle Trap 5/5   Rhomboids 5/5       Scapular Control/Dyskinesis:    Normal / Subtle / Obvious  Comments    Left  Protracted     Right  Protracted      He reports that he does sleep       Palpation Shoulder:    Lateral shoulder pain              CMS Impairment/Limitation/Restriction for FOTO initial  Survey    Therapist reviewed FOTO scores for Lukasz Person on 1/25/2024.   FOTO documents entered into [employer] - see Media section.     17 intake score          Treatment     Treatment Time In: 10:00  am   Treatment Time Out: 11:00 pm   Total Treatment time separate from Evaluation time:55      Lukasz received the following supervised modalities after being cleared for contradictions for 10  minutes:  left shoulder pain       Lukasz received the following manual therapy techniques for 10  minutes:   -A/AROM for shoulder SF, SA, ER and IR   X 15 minutes       Lukasz received therapeutic exercises for 15  minutes including:  Supine forward flexion with dowel , 10 reps   Supine Horz abd , 10 reps   Towel Shoulder IR , 10 reps   Discussed forward head posture     -    Home Exercise Program/Education:  Issued HEP (see patient instructions in EMR) and educated on modality use for pain management . Exercises were reviewed and Lukasz was able to demonstrate them prior to the end of the session.   Pt received a written copy of exercises to perform at home. Lukasz demonstrated good  understanding of the education provided.  Pt was advised to  perform these exercises free of pain, and to stop performing them if pain occurs.    Patient/Family Education: role of OT, goals for OT, scheduling/cancellations - pt verbalized understanding. Discussed insurance limitations with patient.    Additional Education provided:  none     Assessment     Lukasz Person is a 68 y.o. male referred to outpatient occupational/hand therapy and presents with a medical diagnosis of left shoulder , resulting in decreased motion  and demonstrates limitations as described in the chart below.    The patient's rehab potential is Good.     Anticipated barriers to occupational therapy:  none   Pt has no cultural, educational or language barriers to learning provided.    Profile and History Assessment of Occupational Performance Level of Clinical Decision Making Complexity Score   Occupational Profile:   Lukasz Person is a 68 y.o. male who lives with their family and is currently employed as retired . Lukasz Person has difficulty with  grooming  phone/computer use  affecting his/her daily functional abilities. His/her main goal for therapy is dressing and carrying items .     Comorbidities:    Diabetes       Medical and Therapy History Review:   Brief               Performance Deficits    Physical:  Joint Mobility    Cognitive:  No Deficits    Psychosocial:    No Deficits     Clinical Decision Making:  low    Assessment Process:  Problem-Focused Assessments    Modification/Need for Assistance:  Not Necessary    Intervention Selection:  Limited Treatment Options       low  Based on PMHX, co morbidities , data from assessments and functional level of assistance required with task and clinical presentation directly impacting function.       The following goals were discussed with the patient and patient is in agreement with them as to be addressed in the treatment plan.     Goals:       Short Term (4  weeks on 2/29/24 ):  1)   Patient to be IND with HEP and modalities for pain management  2)    Increase ROM 15 degrees in left shoulder motion to increase functional UE use for  shoulder flexion, scaption and ER   3)  Improve muscle strength ing 4/+5 by shoulder motions  in order to carry items with left shoulder .     Long Term (by discharge):  1)   Pt will report 0 out of 10 pain with left shoulder . Reaching forward or overhead into closets .  2)   Patient to increase score by  10 on FOTO to demonstrate improved perception of functional left use.  3)   Pt will return to prior level of function for ADLs and household management.           Plan   Certification Period/Plan of care expiration: 1/25/2024 to  4/30/24     Outpatient Occupational Therapy 2 times weekly for 6 weeks may include the following interventions: Paraffin, Fluidotherapy, Manual therapy/joint mobilizations, Modalities for pain management, US 3 mhz, Therapeutic exercises/activities., and Strengthening.      Sudha Murillo, OT

## 2024-01-29 ENCOUNTER — PATIENT MESSAGE (OUTPATIENT)
Dept: HEMATOLOGY/ONCOLOGY | Facility: CLINIC | Age: 69
End: 2024-01-29
Payer: MEDICARE

## 2024-01-29 ENCOUNTER — OFFICE VISIT (OUTPATIENT)
Dept: INTERNAL MEDICINE | Facility: CLINIC | Age: 69
End: 2024-01-29
Payer: MEDICARE

## 2024-01-29 ENCOUNTER — TELEPHONE (OUTPATIENT)
Dept: HEMATOLOGY/ONCOLOGY | Facility: CLINIC | Age: 69
End: 2024-01-29
Payer: MEDICARE

## 2024-01-29 VITALS
WEIGHT: 191.81 LBS | SYSTOLIC BLOOD PRESSURE: 128 MMHG | OXYGEN SATURATION: 98 % | HEIGHT: 72 IN | BODY MASS INDEX: 25.98 KG/M2 | HEART RATE: 65 BPM | TEMPERATURE: 99 F | DIASTOLIC BLOOD PRESSURE: 60 MMHG | RESPIRATION RATE: 16 BRPM

## 2024-01-29 DIAGNOSIS — I15.2 OBESITY, DIABETES, AND HYPERTENSION SYNDROME: ICD-10-CM

## 2024-01-29 DIAGNOSIS — E11.59 OBESITY, DIABETES, AND HYPERTENSION SYNDROME: ICD-10-CM

## 2024-01-29 DIAGNOSIS — E11.22 CKD STAGE 3 DUE TO TYPE 2 DIABETES MELLITUS: ICD-10-CM

## 2024-01-29 DIAGNOSIS — I48.0 PAROXYSMAL ATRIAL FIBRILLATION: Chronic | ICD-10-CM

## 2024-01-29 DIAGNOSIS — N18.30 CKD STAGE 3 DUE TO TYPE 2 DIABETES MELLITUS: ICD-10-CM

## 2024-01-29 DIAGNOSIS — E11.29 TYPE 2 DIABETES MELLITUS WITH OTHER DIABETIC KIDNEY COMPLICATION, WITHOUT LONG-TERM CURRENT USE OF INSULIN: Primary | Chronic | ICD-10-CM

## 2024-01-29 DIAGNOSIS — E11.9 DM TYPE 2 WITHOUT RETINOPATHY: ICD-10-CM

## 2024-01-29 DIAGNOSIS — I10 ESSENTIAL HYPERTENSION: Chronic | ICD-10-CM

## 2024-01-29 DIAGNOSIS — I50.20 HFREF (HEART FAILURE WITH REDUCED EJECTION FRACTION): Chronic | ICD-10-CM

## 2024-01-29 DIAGNOSIS — E11.69 OBESITY, DIABETES, AND HYPERTENSION SYNDROME: ICD-10-CM

## 2024-01-29 DIAGNOSIS — E66.9 OBESITY, DIABETES, AND HYPERTENSION SYNDROME: ICD-10-CM

## 2024-01-29 PROCEDURE — 99999 PR PBB SHADOW E&M-EST. PATIENT-LVL IV: CPT | Mod: PBBFAC,,, | Performed by: NURSE PRACTITIONER

## 2024-01-29 PROCEDURE — 99214 OFFICE O/P EST MOD 30 MIN: CPT | Mod: S$PBB,,, | Performed by: NURSE PRACTITIONER

## 2024-01-29 PROCEDURE — 99214 OFFICE O/P EST MOD 30 MIN: CPT | Mod: PBBFAC,PO | Performed by: NURSE PRACTITIONER

## 2024-01-29 NOTE — PATIENT INSTRUCTIONS
For now, I think your glucose and weight is pretty well maintained -   I'm not thinking you NEED medication for your diabetes as long as you keep eating health and maintain your weight.     If you need to restart medication - that is - if the glucose trends go up -   Consistently glucose levels greater than 180 -   We can consider restarting metformin daily low dose or a tablet called jardiance or farxiga -     But for now, monitor.     Stay mobile and active as you can.

## 2024-01-29 NOTE — TELEPHONE ENCOUNTER
----- Message from Lin Smith sent at 1/29/2024  9:52 AM CST -----  Regarding: Patient advice  Contact: Pt  [phone]            Name of Caller: Lukasz Sarah Preference:  [phone]    Nature of Call:  Requesting a call back want to know if he can take labs scheduled for 02/05/24 on the date of 01/30/24

## 2024-01-29 NOTE — TELEPHONE ENCOUNTER
Spoke with Maria Esther regarding research labs and whether or not they could be moved to 1/30 from 2/5.  Maria Esther confirmed the labs cannot be done as early as 1/30.  Called patient and left a voicemail to return call.

## 2024-01-29 NOTE — PROGRESS NOTES
68 y.o. male here for routine follow up for management of T2dm -   Last seen July 2023 - those notes are belwo.     A1c remains stable at 6.2% -   Weight is at 191 lbs, bmi is at 26.01 -   Remains stable overall.   This is not on ANY medication at all for diabetes.   He has maintained weight/healthy diet/lifestyle changes.   He was prior on meformin and amaryl at one point.   Lost weight last year and has kept it off.   Can't eat large amounts/portions due to esophageal issues   He plans on beginning to check his glucose levels with digital medicine program - fasting in the morning time likely 2 - 3 times per week.   No complaints at present.         Last visit notes as follows:   68 y.o. male routine follow up for management of T2dm -   Last seen in 2022 - those notes are below    Hgba1c is at 6.2% -   He's always been very well controlled.   Reports in the interim, was diagnosed with Esophageal cancer/adenocarcinoma.   Suffered vocal cord and swallowing issues among many...   Lost a lot of weight b/c of his treatment.   Was at 263 lbs originally - now in office is 199 lbs today.   Says he is still losing weight - in part due to his diet after surgery.     Has trouble eating large portions basically following treatment.    underwent chemo in 2022 -   Then had procedure as follows in march 2023 -   XI ROBOTIC ESOPHAGECTOMY (N/A)  ROBOTIC JEJUNOSTOMY TUBE INSERTION (N/A)   Overall, his portions remain very small and frequent - he's lost a lot of appetite too.     his blood sugars are controlled -   He checks 1 time per day and reports low 100's.   He's been off of oral anti-diabetic medications completely for 3 months steadily now.   (Prior was on metformin 1000mg twice per day and amaryl tablet daily in the morning).     He has cardiac history and history of heart failure.   Afib history on xarelto for this. got it for free for 1 year - then tried to reapply for it  - was told didn't qualify.    - dr. Nick has  "seen him in the past - he reports trying to schedule a follow up soon with him.   Was prescribed coumadin in the interim in case he "runs out" of the xarelto - but isn't taking it  He continues on toprol XL 25mg tablet daily - pulse is 51 -reports his pulse always runs fairly low  Is beginning on hctz 25mg tablet daily for swelling in his legs,   (Formerly on lisinopril/hctz combo) -   His bp is stable/lower end at 110/70 -     He has an appt. With his PCP upcoming - Dr. Wilson.   He is trying to ensure he re-evaluates what medications he needs/doesn't need.     Last visit notes from 2022:   67 y.o. male here for 7 month f/u for management of T2dm -   Last seen in November 2021 - those notes below.     a1c reduced again from 7% to 6.8%.   He continues on same meds:   Metformin 1000 mg twice per day.   Also on glimepiride 4 mg tablet in morning or at lunch - first meal of day. (he was previously taking at night time).   We had discussed option of trulicity in the past, but he has preferred to work on his own lifestyle/dietary habit changes.   Is motivated to lose some weight, but doesn't like the idea of additional medications just yet.     In the interim, he was diagnosed with a kidney stone - ended up infected, and was in sepsis.   He also developed afib when hospitalized. Had cardioversion.   Is now on xarelto and amiodarone.   He is finally home, and he is feeling much better.   Lost weight while he was in the hospital, but seems to be steadily gaining it back    He is checking the blood sugars 1 - 2 times per day - and keeps a book/log of it. -   He varies between 110 - 130's for the most part.   Is concerned that the morning glucose readings tend to be higher than the evening readings...   He went up to 150 -170's on several occasions, but these are sparingly.   He seemed to be the lowest in the 100's after he lost weight following the hospital stay.                   Last visit notes from November 2021 as " follows  66 y.o. gentleman, here for 5 month follow up visit for management of T2dm -   Last seen in June 2021 - those notes are below.     a1c is @ 7%.   Continues on same medications -   Metformin 1000 mg twice per day.   Also on glimepiride 4 mg tablet in morning.   Has gotten a bit off track with eating habits -   Is eating late at night - snacking 10/11pm.   Not exercising currently.   Checking sugars 1 - 2 times per day.   Keeps a log - see below.                      Last visit notes from June 2021:   66 y.o. gentleman, here for follow up office visit for management of T2dm.   Last seen in December 2020 - those notes are below.     a1c remains stable @ 6.9%.   Continues on metformin 1000 mg twice per day.   Also on glimepiride 4mg tablet in am -    Takes medications/does not skip.   Now is established with Dr. Wilson - following for primary care.   Staying active/following ADA diet.   Checks sugars daily in morning. See logs below. Running 120 - 130's on average.   No acute complaints today's visit -                         Last visit notes as follows from December 2020:   65 y.o. gentleman, here for 3 month follow up visit for T2dm.   Last seen 9/11/2020 - those notes below.     a1c slightly improved from 7.1%---> 6.8%.   Continues on metformin 1000 bid.   On glimepiride 2mg in am with breakfast . Had been taking in evening before and I switched him to morning time to help cover prandial highs early on in the day.   This seems to have helped.   He is trying to follow ADA diet.   Lipids - at goal. Now has restarted his crestor 20mg every Hs.   BP controlled on lisinopril/hctz.   No acute complaints today's visit.       Last visit notes from 9/11/2020 as follows:   HPI: Lukasz Person is a 68 y.o.  male c/I for visit to address Diabetes Type 2  This is the first time I am seeing this patient.   Does not have a pcp, but getting established with Dr. Wilson next month.     was diagnosed with T2DM about 10 years ago.    Has never been hospitalized r/t DM.  Taking metformin 1000mg po bid and also on glimepiride 2mg tablets - taking 2 after dinner.   Denies missing doses of DM medication.   Current a1c is controlled at 7.1%.   Coming in today to establish care, and get further recommendations for management of his diabetes.   He is very motivated to lose weight, eat healthier and manage his over all health more effectively.     Past medical History:   Past Medical History:   Diagnosis Date    Anticoagulant long-term use     Cancer     Diabetes mellitus     Onset late 50s/early 60s    Hyperlipidemia     Hypertension     Onset late 50s/early 60s    Kidney stones     Sleep apnea     since 2006      Family hx:   Family History   Problem Relation Age of Onset    Arthritis Mother     Heart disease Father 70        CABG    Arthritis Father     Diabetes Father     Kidney disease Father         had one kidney removed in early thirties    Arthritis Sister     Arthritis Sister     Hypertension Sister     Colon cancer Brother 32    Arthritis Brother     Alcohol abuse Paternal Aunt     Cancer Maternal Grandfather         throat cancer    Diabetes Paternal Grandmother     Colon polyps Paternal Grandfather     Colon cancer Paternal Grandfather     Cancer Paternal Grandfather         colon cancer at age 62      Current meds:   Current Outpatient Medications:     allopurinoL (ZYLOPRIM) 100 MG tablet, TAKE 1 TABLET BY MOUTH EVERY DAY, Disp: 30 tablet, Rfl: 10    blood sugar diagnostic (ACCU-CHEK ANTONELLA PLUS TEST STRP) Strp, Use to test CBG four times daily E11.65, Disp: 400 strip, Rfl: 3    blood-glucose meter kit, Checks blood sugars 1x/daily., Disp: 1 each, Rfl: 12    citric acid-potassium citrate (POLYCITRA) 1,100-334 mg/5 mL solution, Take 10 mLs (20 mEq total) by mouth 2 (two) times a day., Disp: 600 mL, Rfl: 5    hydroCHLOROthiazide (HYDRODIURIL) 25 MG tablet, Take 1 tablet (25 mg total) by mouth once daily., Disp: 30 tablet, Rfl: 11    lancets  (ACCU-CHEK SOFTCLIX LANCETS) Misc, USE 1 EVERY DAY OR AS DIRECTED, Disp: 100 each, Rfl: 3    metoprolol succinate (TOPROL-XL) 25 MG 24 hr tablet, Take 0.5 tablets (12.5 mg total) by mouth once daily., Disp: 15 tablet, Rfl: 11    nitroGLYCERIN (NITROSTAT) 0.4 MG SL tablet, Place 1 tablet (0.4 mg total) under the tongue every 5 (five) minutes as needed for Chest pain. If you need a third tablet, call 911, Disp: 100 tablet, Rfl: 3    omeprazole (PRILOSEC) 40 MG capsule, TAKE 1 CAPSULE(40 MG) BY MOUTH EVERY DAY, Disp: 90 capsule, Rfl: 3    rosuvastatin (CRESTOR) 20 MG tablet, Take 1 tablet (20 mg total) by mouth every evening., Disp: 90 tablet, Rfl: 3    tamsulosin (FLOMAX) 0.4 mg Cap, TAKE 1 CAPSULE(0.4 MG) BY MOUTH EVERY DAY, Disp: 30 capsule, Rfl: 2    testosterone (ANDROGEL) 20.25 mg/1.25 gram (1.62 %) GlPm, Apply 4 pumps to shoulders daily, Disp: 2 each, Rfl: 5    triamcinolone acetonide 0.1% (KENALOG) 0.1 % cream, Apply topically 2 (two) times daily., Disp: 45 g, Rfl: 1    warfarin (COUMADIN) 4 MG tablet, Take 1 tablet (4 mg total) by mouth Daily. And as directed by [facility]mad[facility] Clinic, Disp: 45 tablet, Rfl: 6     Current Diabetes medications:   None.     Was prior on as follows:   Metformin 1000 bid  Amaryl 4 mg tablet in morning. Or with lunch time meal.     Review of Pertinent co-morbidities/risk factors:   CV: Denies history of MI nor stroke.   CAD: Denies.  Does not take aspirin 81mg tablet daily  BP: has history of HTN  Statin: Taking  ACE/ARB: Taking    Social History     Tobacco Use   Smoking Status Former    Current packs/day: 0.00    Average packs/day: 1 pack/day for 22.7 years (22.7 ttl pk-yrs)    Types: Cigarettes, Cigars    Start date: 1972    Quit date:     Years since quittin.7    Passive exposure: Never   Smokeless Tobacco Never   Tobacco Comments    smoking was off and on.  cumulative 7 years.  never more than three years in one stretch or more lj      Social:   Lives at home with wife.    Life changes/stressors currently: moved from [place] about 2 years ago.   Aftermath of esophageal cancer. Very fatigued/medication adjustments.   Diet: not always following ADA diet   Meals: 3 per day and snacks.        Breakfast - eggs, bagel, toast. Cereal with granola, banana.       Lunch - sandwich- turkey or ham       Dinner - tuna fish, stir garcia vegetables with shrimp, orka, lynn peppers, steak        Snacks - chips, crackers         Drinks - tea, water  Exercise: not currently, trying to get back into walking - used to walk 3 miles per day.   Activities: retired.     Glucose Monitoring:   Checking sugars daily in the morning -  Standards of care:   Eyes: .: 12/29/2023  Foot exam: : 12/05/2023   Diabetes education: None.    Vital Signs  /60 (BP Location: Left arm, Patient Position: Sitting, BP Method: Medium (Manual))   Pulse 65   Temp 98.6 °F (37 °C) (Temporal)   Resp 16   Ht 6' (1.829 m)   Wt 87 kg (191 lb 12.8 oz)   SpO2 98%   BMI 26.01 kg/m²     Pertinent Labs:   Hgba1c   Lab Results   Component Value Date    HGBA1C 6.2 (H) 01/08/2024     Lipid panel   Lab Results   Component Value Date    CHOL 120 11/14/2023    CHOL 107 (L) 11/07/2022    CHOL 98 (L) 10/04/2022     Lab Results   Component Value Date    HDL 53 11/14/2023    HDL 34 (L) 11/07/2022    HDL 38 (L) 10/04/2022     Lab Results   Component Value Date    LDLCALC 53.6 (L) 11/14/2023    LDLCALC 29.4 (L) 11/07/2022    LDLCALC 16.2 (L) 10/04/2022     Lab Results   Component Value Date    TRIG 67 11/14/2023    TRIG 218 (H) 11/07/2022    TRIG 219 (H) 10/04/2022     Lab Results   Component Value Date    CHOLHDL 44.2 11/14/2023    CHOLHDL 31.8 11/07/2022    CHOLHDL 38.8 10/04/2022      CMP  Glucose   Date Value Ref Range Status   01/08/2024 120 (H) 70 - 110 mg/dL Final   01/08/2024 118 (H) 70 - 110 mg/dL Final     BUN   Date Value Ref Range Status   01/08/2024 13 8 - 23 mg/dL Final   01/08/2024 12 8 - 23 mg/dL Final     Creatinine   Date Value  Ref Range Status   01/08/2024 1.1 0.5 - 1.4 mg/dL Final   01/08/2024 1.0 0.5 - 1.4 mg/dL Final     eGFR if    Date Value Ref Range Status   05/02/2022 54.5 (A) >60 mL/min/1.73 m^2 Final     eGFR if non    Date Value Ref Range Status   05/02/2022 47.2 (A) >60 mL/min/1.73 m^2 Final     Comment:     Calculation used to obtain the estimated glomerular filtration  rate (eGFR) is the CKD-EPI equation.         Microalbumin creatinine ratio:   Lab Results   Component Value Date    MICALBCREAT 37.0 (H) 05/02/2022       Review Of Systems:   Gen: Appetite good, + weight loss (unintentional after cancer treatments and surgery), +/intermittent fatigue, Denies polydipsia.  Skin: Skin is intact and heals well, denies any rashes or hair changes.   Eyes: Denies any acute visual disturbances, nor blurred vision.   Resp: Denies SOB or Dyspnea on exertion, denies cough.   Trouble with swallowing - since surgery and diagnosis.   Cardiac: Denies chest pain, palpitations, or swelling.   GI: Denies abdominal pain, nausea or vomiting, diarrhea, or constipation. Can only tolerate small portions.   /GYN: Denies nocturia, nor burning, frequency or pain on urination.  MS/Neuro: Denies numbness/ tingling in BLE; Gait steady, speech clear - denies headaches.   Psych: Denies drug/ETOH abuse, no hx of depression.  Other systems: negative.    Physical Exam:   GENERAL: Well developed, well nourished in appearance.   PSYCH: AAOx3, appropriate mood and affect, pleasant expression, conversant, appears relaxed, well groomed.   EYES: PERRL, Conjunctiva and corneas clear  NECK: Soft and Supple, trachea midline,   CHEST: Even, regular, and unlabored respirations  ABDOMEN: Soft, non-tender  VASCULAR: pedal pulses palpable bilaterally, no edema.  NEURO:  cranial nerves II - XII intact   MUSCULOSKELETAL: Good ROM, steady gait.   SKIN: Skin warm, dry, and intact   FEET: Pedal pulses palpable and intact.     Assessment and Plan  "of Care:     Lukasz was seen today for follow-up and diabetes.    Diagnoses and all orders for this visit:    Type 2 diabetes mellitus with other diabetic kidney complication, without long-term current use of insulin  -     Hemoglobin A1C; Future    HFrEF (heart failure with reduced ejection fraction)    CKD stage 3 due to type 2 diabetes mellitus    DM type 2 without retinopathy    Obesity, diabetes, and hypertension syndrome    Paroxysmal atrial fibrillation    Essential hypertension           1. T2DM with hyperglycemia- Hgba1c goal is 7.5% or less without hypoglycemia - at 7.1%---> 6.8%--> 6.9% --. 7% --> 6.8%--> 6.2% --> 6.2% - Not on meds.   Bg's are low 100's. For his age, he is stable.   The weight loss reversed his diabetes and he is maintaining his bg's with lifestyle/diet modifcations.   Can restart metformin if needed.   He has heart failure history so I assume someone will "try" to start jardiance.   We can always try half dose if needed. For now, patient opts for no meds - and I can agree with that approach for now unless necessary.   Advised caution in this due to frailty, weight loss, low bp and heart rate as it is -   Would not start/initiate unless absolutely 100% medically necessary....   discussed DM, progression of disease, long term complications, CV risk factors and tx options.   Advise compliance with ADA diet and encourage exercise- gave him a dietary plan/handout/food list.   Eyes - reported recent exam - outside provider - will ask for/obtain those records.     2. HTN- controlled, continue meds as previously prescribed and monitor.   Lisinopril/hctz - no urine mac.     3. Hyperlipidemia - LDL goal < 100. At goal.   On crestor 20mg every HS.     4. Weight - BMI Body mass index is 26.01 kg/m².   Encourage Ada diet and exercise.   Would like to start glp1 or sglt2i- but theres' not need at present.   He has 60+ lbs of weight loss which in turn helped his diabetes managment. .     5. Gout - on " allopurinol 100mg daily - no recent flare ups.   Dr. Wilson following.      6. CKD - stage 2 - 3 - will monitor trends -   Also reports history of frequent kidney stones. Nothing recent, should be resolved since overactive parathyroid gland removed.   urine MAC is stable Wnl.     7. Hyperparathyroidism - s/p removal of 1 gland - had been found incidentally when he kept having recurring kidney stones.   Following with primary.     8. KUMAR - managed - had sleep MD appt. Yesterday, wearing cpap at night no issues.      9. History of kidney stones - has been thought secondary to the hyperparathyroidism.   Is taking potassium -citrate to prevent. I suggested to call his urologist if he would like to stop, as his over-active parathyroid has been removed.  He continues on same.   Had infection and was in hospital for this, now resolved.      10. Low T - taking Testosterone injections. Feels more energy. Defer - managed per dr. Wilson.   ? Defer -     11. Afib and HF - s/p cardioversion - on bb. Xarelto.   He is following with cardiology.   Recommended talk to Cardiology/Dr. Nick about reducing meds or titration since bp and heart rate are running lower - especially after weight loss 60 lbs.   Likely needs adjustment.   Switching to coumadin now due to cost.       Diabetes stable at present.   Not putting/starting meds unnecessarily.   Follow up in 6 months with OV and labs prior.

## 2024-01-30 ENCOUNTER — LAB VISIT (OUTPATIENT)
Dept: LAB | Facility: HOSPITAL | Age: 69
End: 2024-01-30
Attending: INTERNAL MEDICINE
Payer: MEDICARE

## 2024-01-30 ENCOUNTER — PATIENT MESSAGE (OUTPATIENT)
Dept: CARDIOLOGY | Facility: CLINIC | Age: 69
End: 2024-01-30

## 2024-01-30 ENCOUNTER — ANTI-COAG VISIT (OUTPATIENT)
Dept: CARDIOLOGY | Facility: CLINIC | Age: 69
End: 2024-01-30
Payer: MEDICARE

## 2024-01-30 DIAGNOSIS — Z79.01 LONG TERM (CURRENT) USE OF ANTICOAGULANTS: ICD-10-CM

## 2024-01-30 DIAGNOSIS — I48.0 PAROXYSMAL ATRIAL FIBRILLATION: Primary | Chronic | ICD-10-CM

## 2024-01-30 LAB
INR PPP: 1.7 (ref 0.8–1.2)
PROTHROMBIN TIME: 17.3 SEC (ref 9–12.5)

## 2024-01-30 PROCEDURE — 93793 ANTICOAG MGMT PT WARFARIN: CPT | Mod: ,,,

## 2024-01-30 PROCEDURE — 85610 PROTHROMBIN TIME: CPT | Mod: Q1 | Performed by: INTERNAL MEDICINE

## 2024-01-30 PROCEDURE — 36415 COLL VENOUS BLD VENIPUNCTURE: CPT | Performed by: INTERNAL MEDICINE

## 2024-01-30 NOTE — PROGRESS NOTES
[facility]    01/30/2024 3:31 PM    Lukasz Person (68 y.o.) is followed by the [facility].      Assessment/Plan:    Lukasz Person presents today with subtherapeutic  INR. Goal INR 2.0-3.0    Assessment of patient findings per MA/LPN and chart review:   The following significant findings were found:  None    Recommendation for patient's warfarin regimen:   Patient to take a booster dose today and will continue taking 4mg daily until next INR on 2/5.    Recommended repeat INR in 1 week      Melissa Kinsey, PharmD, BCPS  Clinical Pharmacist - [facility] Clinic  Preferred Contact: Secure Messaging or In Basket Message

## 2024-02-01 ENCOUNTER — PATIENT MESSAGE (OUTPATIENT)
Dept: CARDIOLOGY | Facility: CLINIC | Age: 69
End: 2024-02-01
Payer: MEDICARE

## 2024-02-01 DIAGNOSIS — I42.8 NON-ISCHEMIC CARDIOMYOPATHY: Primary | ICD-10-CM

## 2024-02-01 NOTE — PROGRESS NOTES
02/01/24  Patient called to report he has Flu like symptoms, chest congestion, consistently runny nose, no fever, inquiring if he can take Theraflu or what can he take with the warfarin, call back # [phone]

## 2024-02-01 NOTE — PROGRESS NOTES
New patient findings reviewed, okay for patient to take most cold/flu medications over the counter including Theraflu. The medications he wants to avoid are NSAIDs which include naproxen (Aleve) and ibuprofen (Advil). Most cold medications contain acetaminophen (Tylenol) which is okay to take.

## 2024-02-05 ENCOUNTER — INFUSION (OUTPATIENT)
Dept: INFUSION THERAPY | Facility: HOSPITAL | Age: 69
End: 2024-02-05
Payer: MEDICARE

## 2024-02-05 ENCOUNTER — RESEARCH ENCOUNTER (OUTPATIENT)
Dept: RESEARCH | Facility: HOSPITAL | Age: 69
End: 2024-02-05
Payer: MEDICARE

## 2024-02-05 ENCOUNTER — TELEPHONE (OUTPATIENT)
Dept: HEMATOLOGY/ONCOLOGY | Facility: CLINIC | Age: 69
End: 2024-02-05
Payer: MEDICARE

## 2024-02-05 ENCOUNTER — ANTI-COAG VISIT (OUTPATIENT)
Dept: CARDIOLOGY | Facility: CLINIC | Age: 69
End: 2024-02-05
Payer: MEDICARE

## 2024-02-05 ENCOUNTER — OFFICE VISIT (OUTPATIENT)
Dept: HEMATOLOGY/ONCOLOGY | Facility: CLINIC | Age: 69
End: 2024-02-05
Payer: MEDICARE

## 2024-02-05 VITALS
OXYGEN SATURATION: 97 % | HEART RATE: 71 BPM | SYSTOLIC BLOOD PRESSURE: 134 MMHG | DIASTOLIC BLOOD PRESSURE: 61 MMHG | TEMPERATURE: 99 F | HEIGHT: 72 IN | BODY MASS INDEX: 25.96 KG/M2 | WEIGHT: 191.69 LBS

## 2024-02-05 DIAGNOSIS — C15.9 ESOPHAGEAL ADENOCARCINOMA: Primary | ICD-10-CM

## 2024-02-05 DIAGNOSIS — I50.20 HFREF (HEART FAILURE WITH REDUCED EJECTION FRACTION): ICD-10-CM

## 2024-02-05 DIAGNOSIS — R21 RASH: ICD-10-CM

## 2024-02-05 DIAGNOSIS — J38.00 VOCAL CORD PARALYSIS: ICD-10-CM

## 2024-02-05 DIAGNOSIS — Z79.899 ON ANTINEOPLASTIC CHEMOTHERAPY: ICD-10-CM

## 2024-02-05 DIAGNOSIS — N18.30 TYPE 2 DIABETES MELLITUS WITH STAGE 3 CHRONIC KIDNEY DISEASE, WITHOUT LONG-TERM CURRENT USE OF INSULIN, UNSPECIFIED WHETHER STAGE 3A OR 3B CKD: ICD-10-CM

## 2024-02-05 DIAGNOSIS — Z00.6 CLINICAL TRIAL PARTICIPANT: ICD-10-CM

## 2024-02-05 DIAGNOSIS — I48.0 PAROXYSMAL ATRIAL FIBRILLATION: Primary | Chronic | ICD-10-CM

## 2024-02-05 DIAGNOSIS — I15.2 HYPERTENSION ASSOCIATED WITH DIABETES: ICD-10-CM

## 2024-02-05 DIAGNOSIS — Z79.01 LONG TERM (CURRENT) USE OF ANTICOAGULANTS: ICD-10-CM

## 2024-02-05 DIAGNOSIS — E11.69 HYPERLIPIDEMIA ASSOCIATED WITH TYPE 2 DIABETES MELLITUS: ICD-10-CM

## 2024-02-05 DIAGNOSIS — R13.19 ESOPHAGEAL DYSPHAGIA: ICD-10-CM

## 2024-02-05 DIAGNOSIS — E11.22 TYPE 2 DIABETES MELLITUS WITH STAGE 3 CHRONIC KIDNEY DISEASE, WITHOUT LONG-TERM CURRENT USE OF INSULIN, UNSPECIFIED WHETHER STAGE 3A OR 3B CKD: ICD-10-CM

## 2024-02-05 DIAGNOSIS — E78.5 HYPERLIPIDEMIA ASSOCIATED WITH TYPE 2 DIABETES MELLITUS: ICD-10-CM

## 2024-02-05 DIAGNOSIS — I48.0 PAROXYSMAL ATRIAL FIBRILLATION: ICD-10-CM

## 2024-02-05 DIAGNOSIS — I25.10 CORONARY ARTERY DISEASE INVOLVING NATIVE CORONARY ARTERY OF NATIVE HEART WITHOUT ANGINA PECTORIS: ICD-10-CM

## 2024-02-05 DIAGNOSIS — E11.59 HYPERTENSION ASSOCIATED WITH DIABETES: ICD-10-CM

## 2024-02-05 LAB
ALBUMIN SERPL BCP-MCNC: 4 G/DL (ref 3.5–5.2)
ALP SERPL-CCNC: 104 U/L (ref 55–135)
ALT SERPL W/O P-5'-P-CCNC: 48 U/L (ref 10–44)
ANION GAP SERPL CALC-SCNC: 11 MMOL/L (ref 8–16)
AST SERPL-CCNC: 52 U/L (ref 10–40)
BASOPHILS # BLD AUTO: 0.02 K/UL (ref 0–0.2)
BASOPHILS NFR BLD: 0.5 % (ref 0–1.9)
BILIRUB DIRECT SERPL-MCNC: 0.3 MG/DL (ref 0.1–0.3)
BILIRUB SERPL-MCNC: 0.5 MG/DL (ref 0.1–1)
BUN SERPL-MCNC: 14 MG/DL (ref 8–23)
CALCIUM SERPL-MCNC: 9.4 MG/DL (ref 8.7–10.5)
CHLORIDE SERPL-SCNC: 107 MMOL/L (ref 95–110)
CO2 SERPL-SCNC: 26 MMOL/L (ref 23–29)
CORTIS SERPL-MCNC: 10.8 UG/DL
CREAT SERPL-MCNC: 1 MG/DL (ref 0.5–1.4)
DIFFERENTIAL METHOD BLD: ABNORMAL
EOSINOPHIL # BLD AUTO: 0.2 K/UL (ref 0–0.5)
EOSINOPHIL NFR BLD: 5.4 % (ref 0–8)
ERYTHROCYTE [DISTWIDTH] IN BLOOD BY AUTOMATED COUNT: 13.8 % (ref 11.5–14.5)
EST. GFR  (NO RACE VARIABLE): >60 ML/MIN/1.73 M^2
GLUCOSE SERPL-MCNC: 76 MG/DL (ref 70–110)
HCT VFR BLD AUTO: 41.2 % (ref 40–54)
HGB BLD-MCNC: 13.4 G/DL (ref 14–18)
IMM GRANULOCYTES # BLD AUTO: 0.01 K/UL (ref 0–0.04)
IMM GRANULOCYTES NFR BLD AUTO: 0.3 % (ref 0–0.5)
INR PPP: 1.7 (ref 0.8–1.2)
LDH SERPL L TO P-CCNC: 240 U/L (ref 110–260)
LIPASE SERPL-CCNC: 14 U/L (ref 4–60)
LYMPHOCYTES # BLD AUTO: 0.7 K/UL (ref 1–4.8)
LYMPHOCYTES NFR BLD: 17.1 % (ref 18–48)
MAGNESIUM SERPL-MCNC: 2.3 MG/DL (ref 1.6–2.6)
MCH RBC QN AUTO: 29.1 PG (ref 27–31)
MCHC RBC AUTO-ENTMCNC: 32.5 G/DL (ref 32–36)
MCV RBC AUTO: 90 FL (ref 82–98)
MONOCYTES # BLD AUTO: 0.4 K/UL (ref 0.3–1)
MONOCYTES NFR BLD: 10.4 % (ref 4–15)
NEUTROPHILS # BLD AUTO: 2.6 K/UL (ref 1.8–7.7)
NEUTROPHILS NFR BLD: 66.3 % (ref 38–73)
NRBC BLD-RTO: 0 /100 WBC
PHOSPHATE SERPL-MCNC: 3 MG/DL (ref 2.7–4.5)
PLATELET # BLD AUTO: 157 K/UL (ref 150–450)
PMV BLD AUTO: 10.5 FL (ref 9.2–12.9)
POTASSIUM SERPL-SCNC: 3.5 MMOL/L (ref 3.5–5.1)
PROT SERPL-MCNC: 7.3 G/DL (ref 6–8.4)
PROTHROMBIN TIME: 17.6 SEC (ref 9–12.5)
RBC # BLD AUTO: 4.6 M/UL (ref 4.6–6.2)
SODIUM SERPL-SCNC: 144 MMOL/L (ref 136–145)
T4 FREE SERPL-MCNC: 1.05 NG/DL (ref 0.71–1.51)
TSH SERPL DL<=0.005 MIU/L-ACNC: 0.5 UIU/ML (ref 0.4–4)
WBC # BLD AUTO: 3.86 K/UL (ref 3.9–12.7)

## 2024-02-05 PROCEDURE — 82248 BILIRUBIN DIRECT: CPT | Performed by: INTERNAL MEDICINE

## 2024-02-05 PROCEDURE — 83615 LACTATE (LD) (LDH) ENZYME: CPT | Performed by: INTERNAL MEDICINE

## 2024-02-05 PROCEDURE — A4216 STERILE WATER/SALINE, 10 ML: HCPCS | Mod: Q1 | Performed by: REGISTERED NURSE

## 2024-02-05 PROCEDURE — 84443 ASSAY THYROID STIM HORMONE: CPT | Performed by: INTERNAL MEDICINE

## 2024-02-05 PROCEDURE — 85025 COMPLETE CBC W/AUTO DIFF WBC: CPT | Performed by: INTERNAL MEDICINE

## 2024-02-05 PROCEDURE — 96413 CHEMO IV INFUSION 1 HR: CPT

## 2024-02-05 PROCEDURE — 80053 COMPREHEN METABOLIC PANEL: CPT | Performed by: INTERNAL MEDICINE

## 2024-02-05 PROCEDURE — 84439 ASSAY OF FREE THYROXINE: CPT | Mod: Q1 | Performed by: INTERNAL MEDICINE

## 2024-02-05 PROCEDURE — 84100 ASSAY OF PHOSPHORUS: CPT | Mod: Q1 | Performed by: INTERNAL MEDICINE

## 2024-02-05 PROCEDURE — 36415 COLL VENOUS BLD VENIPUNCTURE: CPT | Performed by: INTERNAL MEDICINE

## 2024-02-05 PROCEDURE — 25000003 PHARM REV CODE 250: Performed by: REGISTERED NURSE

## 2024-02-05 PROCEDURE — 99213 OFFICE O/P EST LOW 20 MIN: CPT | Mod: PBBFAC | Performed by: INTERNAL MEDICINE

## 2024-02-05 PROCEDURE — 82533 TOTAL CORTISOL: CPT | Mod: Q1 | Performed by: INTERNAL MEDICINE

## 2024-02-05 PROCEDURE — 99214 OFFICE O/P EST MOD 30 MIN: CPT | Mod: Q1,S$PBB,, | Performed by: INTERNAL MEDICINE

## 2024-02-05 PROCEDURE — 83735 ASSAY OF MAGNESIUM: CPT | Performed by: INTERNAL MEDICINE

## 2024-02-05 PROCEDURE — 83690 ASSAY OF LIPASE: CPT | Mod: Q1 | Performed by: INTERNAL MEDICINE

## 2024-02-05 PROCEDURE — 99999 PR PBB SHADOW E&M-EST. PATIENT-LVL III: CPT | Mod: PBBFAC,,, | Performed by: INTERNAL MEDICINE

## 2024-02-05 PROCEDURE — 63600175 PHARM REV CODE 636 W HCPCS: Mod: Q1 | Performed by: REGISTERED NURSE

## 2024-02-05 PROCEDURE — 85610 PROTHROMBIN TIME: CPT | Performed by: INTERNAL MEDICINE

## 2024-02-05 PROCEDURE — 93793 ANTICOAG MGMT PT WARFARIN: CPT | Mod: ,,,

## 2024-02-05 RX ORDER — SODIUM CHLORIDE 0.9 % (FLUSH) 0.9 %
10 SYRINGE (ML) INJECTION
Status: DISCONTINUED | OUTPATIENT
Start: 2024-02-05 | End: 2024-02-05 | Stop reason: HOSPADM

## 2024-02-05 RX ORDER — DIPHENHYDRAMINE HYDROCHLORIDE 50 MG/ML
50 INJECTION INTRAMUSCULAR; INTRAVENOUS ONCE AS NEEDED
Status: CANCELLED | OUTPATIENT
Start: 2024-02-05

## 2024-02-05 RX ORDER — SODIUM CHLORIDE 0.9 % (FLUSH) 0.9 %
10 SYRINGE (ML) INJECTION
Status: CANCELLED | OUTPATIENT
Start: 2024-02-05

## 2024-02-05 RX ORDER — HEPARIN 100 UNIT/ML
500 SYRINGE INTRAVENOUS
Status: DISCONTINUED | OUTPATIENT
Start: 2024-02-05 | End: 2024-02-05 | Stop reason: HOSPADM

## 2024-02-05 RX ORDER — DIPHENHYDRAMINE HYDROCHLORIDE 50 MG/ML
50 INJECTION INTRAMUSCULAR; INTRAVENOUS ONCE AS NEEDED
Status: DISCONTINUED | OUTPATIENT
Start: 2024-02-05 | End: 2024-02-05 | Stop reason: HOSPADM

## 2024-02-05 RX ORDER — HEPARIN 100 UNIT/ML
500 SYRINGE INTRAVENOUS
Status: CANCELLED | OUTPATIENT
Start: 2024-02-05

## 2024-02-05 RX ORDER — TRIAMCINOLONE ACETONIDE 1 MG/G
CREAM TOPICAL 2 TIMES DAILY
Qty: 45 G | Refills: 2 | Status: SHIPPED | OUTPATIENT
Start: 2024-02-05 | End: 2024-05-16

## 2024-02-05 RX ORDER — EPINEPHRINE 0.3 MG/.3ML
0.3 INJECTION SUBCUTANEOUS ONCE AS NEEDED
Status: DISCONTINUED | OUTPATIENT
Start: 2024-02-05 | End: 2024-02-05 | Stop reason: HOSPADM

## 2024-02-05 RX ORDER — EPINEPHRINE 0.3 MG/.3ML
0.3 INJECTION SUBCUTANEOUS ONCE AS NEEDED
Status: CANCELLED | OUTPATIENT
Start: 2024-02-05

## 2024-02-05 RX ADMIN — Medication 10 ML: at 03:02

## 2024-02-05 RX ADMIN — SODIUM CHLORIDE: 9 INJECTION, SOLUTION INTRAVENOUS at 02:02

## 2024-02-05 RX ADMIN — HEPARIN 500 UNITS: 100 SYRINGE at 03:02

## 2024-02-05 NOTE — TELEPHONE ENCOUNTER
: URIEL Manriquez MD  Treating Investigators: NIRAV Medrano MD  Surgical Oncologist: SARAH Brown M.D. / Gerri Zhang NP  Radiation Oncologist: Javier Moulton M.D, PhD     Protocol: ECOG-ACRIN [id number]  IRB#: [id number]  Patient ID: [id number]  Treatment: Arm B - Carbo+Taxol+Nivolumab  Treatment: Arm C - Nivolumab Monotherapy         A Phase II/III Study of Dilcia-operative Nivolumab and Ipilimumab in Patients with Locoregional Esophageal and Gastroesophageal Junction Adenocarcinoma     LM on pt's VM @ 1041 re: lab appointment today.   CRC explained that labs for clinical trials need to be performed within 1-2 days prior to the scheduled infusion to ensure we get the most recent assessment of his labs so he may receive treatment on schedule. Patient also informed that the labs he requested to be drawn last week for today's visit are not within study window & should have his labs collected today with the coags required for coumadin clinic.   CRC left CB number on patient VM for additional questions or concerns.

## 2024-02-05 NOTE — PROGRESS NOTES
MEDICAL ONCOLOGY - ESTABLISHED PATIENT VISIT    Reason for visit: esophageal cancer    Best Contact Phone Number(s): [phone] (home)      Cancer/Stage/TNM:    Cancer Staging   Esophageal adenocarcinoma  Staging form: Esophagus - Adenocarcinoma, AJCC 8th Edition  - Pathologic stage from 3/28/2023: Stage II (ypT3, pN0, cM0, G2) - Signed by Santana Brown Jr., MD on 3/28/2023       Oncology History   Esophageal adenocarcinoma   12/8/2022 Initial Diagnosis    Esophageal adenocarcinoma     12/21/2022 - 12/21/2022 Chemotherapy    Treatment Summary   Plan Name: OP ESOPHAGEAL PACLITAXEL CARBOPLATIN WEEKLY  Treatment Goal: Curative  Status: Inactive  Start Date:   End Date:   Provider: Miki Medrano MD  Chemotherapy: CARBOplatin (PARAPLATIN) in sodium chloride 0.9% 250 mL chemo infusion, , Intravenous, Clinic/HOD 1 time, 0 of 1 cycle  PACLitaxeL (TAXOL) 50 mg/m2 = 120 mg in sodium chloride 0.9% 250 mL chemo infusion, 50 mg/m2, Intravenous, Clinic/HOD 1 time, 0 of 1 cycle     12/29/2022 - 1/20/2023 Chemotherapy    Treatment Summary   Plan Name: [facility] [id number] ARM B CARBOPLATIN PACLITAXEL NIVOLUMAB  Treatment Goal: Curative  Status: Inactive  Start Date: 12/29/2022  End Date: 1/20/2023  Provider: Miki Medrano MD  Chemotherapy: CARBOplatin (PARAPLATIN) 215 mg in sodium chloride 0.9% 306.5 mL chemo infusion, 215 mg, Intravenous, Clinic/HOD 1 time, 4 of 5 cycles  Administration: 215 mg (12/29/2022), 230 mg (1/5/2023), 265 mg (1/13/2023), 265 mg (1/20/2023)  PACLitaxeL (TAXOL) 50 mg/m2 = 120 mg in sodium chloride 0.9% 250 mL chemo infusion, 50 mg/m2 = 120 mg, Intravenous, Clinic/HOD 1 time, 4 of 5 cycles  Dose modification: 50 mg/m2 (original dose 50 mg/m2, Cycle 4)  Administration: 120 mg (12/29/2022), 120 mg (1/5/2023), 120 mg (1/13/2023), 120 mg (1/20/2023)     12/29/2022 - 2/1/2023 Radiation Therapy    Treating physician: Dr. Javier Moulton    Course: C1 CHEST 2022    Treatment Site Ref. ID Energy  Dose/Fx (Gy) #Fx Dose Correction (Gy) Total Dose (Gy) Start Date End Date Elapsed Days   IM Esophagus [id number] 6X 1.8 23 / 23 0 41.4 12/29/2022 2/1/2023 34        3/17/2023 Surgery    Procedure: Procedure(s) (LRB):  XI ROBOTIC ESOPHAGECTOMY (N/A)  ROBOTIC JEJUNOSTOMY TUBE INSERTION (N/A)      Surgeon(s) and Role:     * Santana Brown Jr., MD - Primary     * Darian Murphy MD - Assisting     Assistance: Due to having no qualified resident during critical portions of the case, Dr. Darian Murphy acted as an assistant.     Pre-Operative Diagnosis: Esophageal adenocarcinoma, distal 1/3     Post-Operative Diagnosis: Same     Pre-Operative Variables:  Stage: T3 N0  Adjacent organ involvement: None  Chemotherapy within 90 Days: Yes  Radiation Therapy within 90 Days: Yes     Comorbidities:  Morbid obesity  Type 2 diabetes mellitus  Obstructive sleep apnea  Gout  Hyperparathyroidism  Hypertension  Severe obesity  Coronary artery disease  Heart failure with reduced ejection fraction    Procedure:  Robotic-assisted Vj three field esophagectomy  Regional mediastinal lymph node dissection  Botox injection of the pylorus  Jejunostomy tube placement     Operative Findings:                No evidence of distant intraperitoneal metastases in the chest or abdomen  Esophageal tumor located in the distal third of the esophagus, mild radiation changes appreciated.  Resection status:  R0 pending final pathology.  No gross disease remaining post dissection.  Frozen sections on proximal and distal margins negative for malignancy  100u Botox injection into the pylorus  Stapled esophagogastrostomy performed at the neck incision after confirmation of negative margins.  Jejunostomy tube placed      3/28/2023 Cancer Staged    Staging form: Esophagus - Adenocarcinoma, AJCC 8th Edition  - Pathologic stage from 3/28/2023: Stage II (ypT3, pN0, cM0, G2)     5/1/2023 - 5/1/2023 Chemotherapy    Treatment Summary   Plan Name: [facility] [id number] ARM C  ADJUVANT NIVOLUMAB  Treatment Goal: Curative  Status: Inactive  Start Date: 5/1/2023  End Date: 5/1/2023  Provider: Miki Medrano MD  Chemotherapy: [No matching medication found in this treatment plan]     5/29/2023 -  Chemotherapy    Treatment Summary   Plan Name: [facility] [id number] ARM C ADJUVANT NIVOLUMAB STEP 2  Treatment Goal: Curative  Status: Active  Start Date: 5/29/2023  End Date: 4/29/2024 (Planned)  Provider: Miki Medrano MD  Chemotherapy: [No matching medication found in this treatment plan]          Interim History:   Mr. Person returns to clinic today prior to cycle 11 of adjuvant nivolumab. He underwent robotic esophagectomy on 3/14/23 with Dr. Brown and J tube insertion.     Doing well overall. Mild fatigue, particularly after eating. No changes to dysphagia - occasionally bothersome to him. No other concerns. No fevers, chills, SOB, CP, palpitations, nausea, vomiting, diarrhea, constipation, blood in urine or stool.     Presents to clinic alone. ECOG 0.     Review of Systems   Constitutional:  Negative for chills, diaphoresis, fever, malaise/fatigue and weight loss.   HENT:  Negative for sore throat.    Eyes:  Negative for blurred vision and double vision.   Respiratory:  Negative for cough and shortness of breath.    Cardiovascular:  Negative for chest pain, palpitations and leg swelling.   Gastrointestinal:  Negative for abdominal pain, blood in stool, constipation, diarrhea, heartburn, nausea and vomiting.        Dysphagia   Genitourinary:  Negative for dysuria, frequency, hematuria and urgency.   Musculoskeletal:  Negative for back pain, falls, myalgias and neck pain.   Skin:  Positive for itching and rash.   Neurological:  Negative for dizziness, tingling, weakness and headaches.   Psychiatric/Behavioral:  The patient is not nervous/anxious.      Past Medical History:   Past Medical History:   Diagnosis Date    Anticoagulant long-term use     Cancer     Diabetes mellitus     Onset late  50s/early 60s    Hyperlipidemia     Hypertension     Onset late 50s/early 60s    Kidney stones     Sleep apnea     since 2006      Past Surgical History:   Past Surgical History:   Procedure Laterality Date    COLONOSCOPY N/A 10/26/2023    Procedure: COLONOSCOPY;  Surgeon: Noah Duong MD;  Location: [facility] (4TH FLR);  Service: Endoscopy;  Laterality: N/A;  instructions sent to patient portal. Tb  referral: Dr. Wilson  Pt. on Xarelto--tb-ok to hold see te 8/15/23 dr vu  10/13-precall complete-MS  10/19 pt rescheduled, Xarelto hold, PEG, portal -ml  10/24-precall complete-KPvt    CORONARY ANGIOGRAPHY N/A 9/30/2020    Procedure: ANGIOGRAM, CORONARY ARTERY;  Surgeon: John West MD;  Location: [facility] CATH LAB;  Service: Cardiology;  Laterality: N/A;    CYSTOSCOPY N/A 2/17/2022    Procedure: CYSTOSCOPY;  Surgeon: Lukasz Hughes MD;  Location: [facility] OR 1ST FLR;  Service: Urology;  Laterality: N/A;    CYSTOSCOPY W/ URETERAL STENT PLACEMENT N/A 2/25/2022    Procedure: CYSTOSCOPY, WITH URETERAL STENT INSERTION;  Surgeon: Lukasz Hughes MD;  [facility];  Service: Urology;  Laterality: N/A;    ENDOSCOPIC ULTRASOUND OF UPPER GASTROINTESTINAL TRACT N/A 12/7/2022    Procedure: ULTRASOUND, UPPER GI TRACT, ENDOSCOPIC;  Surgeon: Darian Main MD;  Location: [facility];  Service: Endoscopy;  Laterality: N/A;  Approval to hold Xarelto rec'd from Dr. Vu (see t/e 12/5/22)-DS    ESOPHAGOGASTRODUODENOSCOPY N/A 11/17/2022    Procedure: EGD (ESOPHAGOGASTRODUODENOSCOPY);  Surgeon: Brody Gonzales MD;  Location: [facility] (2ND FLR);  Service: Endoscopy;  Laterality: N/A;  inst via email-ok to hold Xarelto x 2 days-MS    ESOPHAGOGASTRODUODENOSCOPY N/A 5/31/2023    Procedure: EGD (ESOPHAGOGASTRODUODENOSCOPY) WITH DILATION;  Surgeon: Santana Brown Jr., MD;  Location: [facility] OR 2ND FLR;  Service: General;  Laterality: N/A;    LASER LITHOTRIPSY Left 2/25/2022    Procedure: LITHOTRIPSY, USING  LASER;  Surgeon: Lukasz Hughes MD;  [address];  Service: Urology;  Laterality: Left;    LEFT HEART CATHETERIZATION Left 9/30/2020    Procedure: Left heart cath;  Surgeon: John West MD;  Location: [facility] CATH LAB;  Service: Cardiology;  Laterality: Left;    LITHOTRIPSY      PARATHYROIDECTOMY  1/1/2-107    PYELOSCOPY Left 2/25/2022    Procedure: PYELOSCOPY;  Surgeon: Lukasz Hughes MD;  Location: [address];  Service: Urology;  Laterality: Left;    ROBOT-ASSISTED SURGICAL REMOVAL OF ESOPHAGUS USING DA CARLOS XI N/A 3/14/2023    Procedure: XI ROBOTIC ESOPHAGECTOMY;  Surgeon: Santana Brown Jr., MD;  Location: [address];  Service: General;  Laterality: N/A;  Abdomen, Chest and Neck    ROBOTIC JEJUNOSTOMY N/A 3/14/2023    Procedure: ROBOTIC JEJUNOSTOMY TUBE INSERTION;  Surgeon: Santana Brown Jr., MD;  Location: [address];  Service: General;  Laterality: N/A;    TRANSESOPHAGEAL ECHOCARDIOGRAPHY N/A 4/7/2022    Procedure: ECHOCARDIOGRAM, TRANSESOPHAGEAL;  Surgeon: Xander Diagnostic Provider;  Location: [facility] EP LAB;  Service: Cardiology;  Laterality: N/A;    TREATMENT OF CARDIAC ARRHYTHMIA N/A 4/7/2022    Procedure: Cardioversion or Defibrillation;  Surgeon: Iris Fisher NP;  Location: [facility] EP LAB;  Service: Cardiology;  Laterality: N/A;  afib, dccv, artie, anes, EH, 3prep    URETEROSCOPIC REMOVAL OF URETERIC CALCULUS Left 2/25/2022    Procedure: REMOVAL, CALCULUS, URETER, URETEROSCOPIC;  Surgeon: Lukasz Hughes MD;  Location: [address];  Service: Urology;  Laterality: Left;    URETEROSCOPY Left 2/25/2022    Procedure: URETEROSCOPY;  Surgeon: Lukasz Hughes MD;  Location: [address];  Service: Urology;  Laterality: Left;    VOCAL CORD INJECTION Left 3/17/2023    Procedure: INJECTION, VOCAL CORD, LARYNGOSCOPIC;  Surgeon: Gm Altman MD;  Location: [address];  Service: ENT;  Laterality: Left;      Family History:   Family History   Problem  Relation Age of Onset    Arthritis Mother     Heart disease Father 70        CABG    Arthritis Father     Diabetes Father     Kidney disease Father         had one kidney removed in early thirties    Arthritis Sister     Arthritis Sister     Hypertension Sister     Colon cancer Brother 32    Arthritis Brother     Alcohol abuse Paternal Aunt     Cancer Maternal Grandfather         throat cancer    Diabetes Paternal Grandmother     Colon polyps Paternal Grandfather     Colon cancer Paternal Grandfather     Cancer Paternal Grandfather         colon cancer at age 62      Social History:   Social History     Tobacco Use    Smoking status: Former     Current packs/day: 0.00     Average packs/day: 1 pack/day for 22.7 years (22.7 ttl pk-yrs)     Types: Cigarettes, Cigars     Start date: 1972     Quit date:      Years since quittin.7     Passive exposure: Never    Smokeless tobacco: Never    Tobacco comments:     smoking was off and on.  cumulative 7 years.  never more than three years in one stretch or more lj   Substance Use Topics    Alcohol use: Yes     Alcohol/week: 4.0 standard drinks of alcohol     Types: 4 Shots of liquor per week      I have reviewed and updated the patient's past medical, surgical, family and social histories.    Allergies:   Review of patient's allergies indicates:  No Known Allergies     Medications:   Current Outpatient Medications   Medication Sig Dispense Refill    allopurinoL (ZYLOPRIM) 100 MG tablet TAKE 1 TABLET BY MOUTH EVERY DAY 30 tablet 10    blood sugar diagnostic (ACCU-CHEK ANTONELLA PLUS TEST STRP) Strp Use to test CBG four times daily E11.65 400 strip 3    blood-glucose meter kit Checks blood sugars 1x/daily. 1 each 12    hydroCHLOROthiazide (HYDRODIURIL) 25 MG tablet Take 1 tablet (25 mg total) by mouth once daily. 30 tablet 11    lancets (ACCU-CHEK SOFTCLIX LANCETS) Misc USE 1 EVERY DAY OR AS DIRECTED 100 each 3    metoprolol succinate (TOPROL-XL) 25 MG 24 hr tablet Take  0.5 tablets (12.5 mg total) by mouth once daily. 15 tablet 11    nitroGLYCERIN (NITROSTAT) 0.4 MG SL tablet Place 1 tablet (0.4 mg total) under the tongue every 5 (five) minutes as needed for Chest pain. If you need a third tablet, call 911 100 tablet 3    omeprazole (PRILOSEC) 40 MG capsule TAKE 1 CAPSULE(40 MG) BY MOUTH EVERY DAY 90 capsule 3    rosuvastatin (CRESTOR) 20 MG tablet Take 1 tablet (20 mg total) by mouth every evening. 90 tablet 3    tamsulosin (FLOMAX) 0.4 mg Cap TAKE 1 CAPSULE(0.4 MG) BY MOUTH EVERY DAY 30 capsule 2    testosterone (ANDROGEL) 20.25 mg/1.25 gram (1.62 %) GlPm Apply 4 pumps to shoulders daily 2 each 5    triamcinolone acetonide 0.1% (KENALOG) 0.1 % cream Apply topically 2 (two) times daily. 45 g 1    warfarin (COUMADIN) 4 MG tablet Take 1 tablet (4 mg total) by mouth Daily. And as directed by Coumadin Clinic 45 tablet 6    citric acid-potassium citrate (POLYCITRA) 1,100-334 mg/5 mL solution Take 10 mLs (20 mEq total) by mouth 2 (two) times a day. (Patient not taking: Reported on 2/5/2024) 600 mL 5     No current facility-administered medications for this visit.      Physical Exam:   /61 (BP Location: Left arm, Patient Position: Sitting, BP Method: Medium (Automatic))   Pulse 71   Temp 98.5 °F (36.9 °C) (Oral)   Ht 6' (1.829 m)   Wt 87 kg (191 lb 11 oz)   SpO2 97%   BMI 26.00 kg/m²      ECOG Performance status: 0    Physical Exam  Vitals reviewed.   Constitutional:       General: He is not in acute distress.     Appearance: Normal appearance. He is not ill-appearing, toxic-appearing or diaphoretic.   HENT:      Head: Normocephalic and atraumatic.      Right Ear: External ear normal.      Left Ear: External ear normal.      Nose: Nose normal. No congestion.      Mouth/Throat:      Pharynx: Oropharynx is clear.   Eyes:      General: No scleral icterus.     Extraocular Movements: Extraocular movements intact.      Conjunctiva/sclera: Conjunctivae normal.      Pupils: Pupils  are equal, round, and reactive to light.   Neck:      Comments: L neck wound well-healed  Cardiovascular:      Rate and Rhythm: Normal rate and regular rhythm.   Pulmonary:      Effort: Pulmonary effort is normal. No respiratory distress.   Chest:      Comments: RCW port  Abdominal:      General: There is no distension.      Palpations: Abdomen is soft.      Tenderness: There is no abdominal tenderness.   Musculoskeletal:         General: No swelling.      Cervical back: Normal range of motion.   Lymphadenopathy:      Cervical: No cervical adenopathy.   Skin:     Coloration: Skin is not jaundiced.      Findings: Rash (grade I rash to upper chest/back.) present. No bruising or erythema.   Neurological:      General: No focal deficit present.      Mental Status: He is alert and oriented to person, place, and time. Mental status is at baseline.      Cranial Nerves: No cranial nerve deficit.      Motor: No weakness.      Gait: Gait normal.   Psychiatric:         Mood and Affect: Mood normal.         Behavior: Behavior normal.         Thought Content: Thought content normal.         Judgment: Judgment normal.         Labs:   No results found for this or any previous visit (from the past 48 hour(s)).    Imaging:    10/13/23 - CT CAP:    Impression:     1. Postsurgical change of esophagectomy and gastric pull-through for esophageal adenocarcinoma.  No signs of recurrence or metastatic disease.  2. Additional findings as above.  RECIST SUMMARY:     Date of prior examination for comparison: 04/11/2023     No measurable lesions for RECIST criteria.    Path:   3/14/23:  Final Pathologic Diagnosis 1. LYMPH NODE, LEVEL 7, EXCISION:   One benign lymph node (0/1)   2. TOTAL THORACIC ESOPHAGECTOMY AND PROXIMAL STOMACH:   Adenocarcinoma, moderately differentiated, 3.0 cm   Margins are negative   Tumor invades adventitia   Extensive residual cancer with no evident tumor regression (poor or no   response, score 3)   Eleven benign  lymph nodes (0/11)   3. RESIDUAL CONDUIT, EXCISION:   Negative for malignancy   SYNOPTIC REPORT   Procedure - Esophageal gastrectomy   Tumor site - GE Junction   Relationship of tumor to esophagogastric junction - Lesion is central at GE   junction   Tumor size - 3.0 x 3.1cm   Histologic type - Adenocarcinoma   Histologic grade - Moderately differentiated   Microscopic tumor extension - Tumor invades adventitia   Margins - All margins of resection are uninvolved, proximal, distal and   adventitial margins are negative   Treatment effect - Extensive residual cancer with no evident tumor regression   (poor or no response, score 3)   Lymphovascular invasion - Not identified   Pathologic staging - yp T3 N0 Mx   Lymph nodes examined - 12   Lymph nodes involved - 0   Additional pathologic findings - Intestinal metaplasia present   MMR-IHC has been performed on previous biopsy ([id number]) and shows all 4   antibodies intact      Assessment:       1. Esophageal adenocarcinoma    2. Esophageal dysphagia    3. Vocal cord paralysis    4. Hypertension associated with diabetes    5. Hyperlipidemia associated with type 2 diabetes mellitus    6. Type 2 diabetes mellitus with stage 3 chronic kidney disease, without long-term current use of insulin, unspecified whether stage 3a or 3b CKD    7. Coronary artery disease involving native coronary artery of native heart without angina pectoris    8. Paroxysmal atrial fibrillation    9. HFrEF (heart failure with reduced ejection fraction)    10. Rash        Plan:     # Esophageal adenocarcinoma   Mr. Person is a pleasant 67 year old male who presents to our clinic for management of esophageal cancer. He initially presented with dysphagia, and further workup confirmed a stage II adenocarcinoma, pMMR. Tumor staged T3N0Mx by endosonographic criteria, JEVON. CT CAP on 12/14/22 confirmed no evidence of metastatic disease.    Previously conversation regarding his diagnosis and treatment options.   Recommended perioperative chemo/radiation per the CROSS trial. Discussed chemoradiation for ~5 weeks with 5 doses of weekly carboplatin and taxol administered. Plan to obtain restaging scans 4-5 weeks after radiation completed.     He was a candidate for a cooperative group trial assessing perioperative immunotherapy treatment. He consented for this study - ECOG-ACRIN [id number]: A Phase II/III Study of Dilcia-operative Nivolumab and Ipilimumab in Patients with Locoregional Esophageal and Gastroesophageal Junction Adenocarcinoma    Previously met with Dr. Santana Brown who agreed he was a surgical candidate.  Previously met with Dr. Javier Moulton who will be treating him with radiation.    Began cycle 1 carboplatin/paclitaxel/nivolumab on trial on 12/29/22.  Received cycle 4 of weekly chemotherapy on trial on 1/20/23.   We held chemotherapy for week 5 because of thrombocytopenia per protocol.    Completed radiation on 2/1/23.    Restaging PET/CT personally reviewed on 3/1/23 shows interval decreased FDG avidity in esophageal tumor, no new sites of disease.  CT CAP 3/2/23 shows stable esophageal thickening.    Underwent robotic esophagectomy on 3/14/23 with Dr. Brown.  Pathology showed negative margins, ypT3 N0 tumor with 0 of 12 lymph nodes involved.  No treatment effect was noted on the tumor tissue.    Discussed that per the [id number] protocol will proceed with randomization to adjuvant nivolumab +/- ipilimumab. Patient randomized to receive adjuvant Nivolumab, started cycle 1 at 480 mg q4 weeks 5/1/23. Plan for twelve months per protocol.    Tolerating very well. Grade 1 pruritis.    Repeat imaging after cycle 6 shows DAVE.    Proceed with cycle 11 today at 480 mg q4 weeks.  Labs to be redrawn today prior to infusion.   Timeout occurred with myself and Maria Esther SORTO. Orders signed.    Underwent dilation with Dr. Brown on 5/31/23 with temporary improvement in dysphagia. Weight stable.  Still having some dysphagia  so esophogram ordered. No worsening at this time.   PD-L1 CPS 30.    RTC in 4 weeks.    # Vocal cord paralysis  Post-op. S/p injection by ENT.  Stable.  Last evaluated 9/11/23 by ENT with improvement.    No further interventions planned at this time.    # HTN, HLD, DM, CKD  Following with PCP Dr. Wilson and nephrologist Dr. Oconnell.   BP stable in clinic today. Asymptomatic. Has been off his HCTZ/lisinopril because of prior hypotension.  Cr stable.    # CAD, A fib, CHF  Following with cardiologist Dr. Nick & EP Dr. Phillip.   Was on Xarelto. Now on warfarin. Being monitored by coumadin clinic. Labs monitored on regular basis.   Continue medical management.     # Rash  Grade I, very mild. Likely 2/2 immunotherapy.   Continue triamcinolone - symptoms controlled at this time. Refill sent.   Continue to monitor.     Follow up: per research.    Patient is in agreement with the proposed treatment plan. All questions were answered to the patient's satisfaction. Pt knows to call clinic if anything is needed before the next clinic visit.    Patient discussed with collaborating physician, Dr. Medrano.    At least 30 minutes were spent today on this encounter including face to face time with the patient, data gathering/interpretation and documentation.       Bernadette Patterson, MSN, APRN, ACCNS-  Hematology and Medical Oncology  Clinical Nurse Specialist to Dr. Medrano, Dr. Smith & Dr. David    Route Chart for Scheduling    Med Onc Chart Routing      Follow up with physician . Per research   Follow up with SAMUEL    Infusion scheduling note    Injection scheduling note    Labs    Imaging    Pharmacy appointment    Other referrals              Treatment Plan Information   [facility] [id number] ARM C ADJUVANT NIVOLUMAB STEP 2   Miki Medrano MD   Upcoming Treatment Dates - [facility] [id number] ARM C ADJUVANT NIVOLUMAB STEP 2    2/5/2024       Chemotherapy       INV nivolumab 480 mg in INV sodium chloride 0.9 % 100 mL chemo infusion  3/4/2024        Chemotherapy       INV nivolumab 480 mg in INV sodium chloride 0.9 % 100 mL chemo infusion  4/1/2024       Chemotherapy       INV nivolumab 480 mg in INV sodium chloride 0.9 % 100 mL chemo infusion  4/29/2024       Chemotherapy       INV nivolumab 480 mg in INV sodium chloride 0.9 % 100 mL chemo infusion    Supportive Plan Information  IV FLUIDS AND ELECTROLYTES   Miki Medrano MD   Upcoming Treatment Dates - IV FLUIDS AND ELECTROLYTES    No upcoming days in selected categories.    Therapy Plan Information  Flushes  heparin, porcine (PF) 100 unit/mL injection flush 500 Units  500 Units, Intravenous, Every visit  sodium chloride 0.9% flush 10 mL  10 mL, Intravenous, Every visit

## 2024-02-06 ENCOUNTER — PATIENT MESSAGE (OUTPATIENT)
Dept: CARDIOLOGY | Facility: CLINIC | Age: 69
End: 2024-02-06
Payer: MEDICARE

## 2024-02-06 ENCOUNTER — CLINICAL SUPPORT (OUTPATIENT)
Dept: REHABILITATION | Facility: HOSPITAL | Age: 69
End: 2024-02-06
Payer: MEDICARE

## 2024-02-06 DIAGNOSIS — R53.81 PHYSICAL DECONDITIONING: Primary | ICD-10-CM

## 2024-02-06 DIAGNOSIS — F43.29 STRESS AND ADJUSTMENT REACTION: ICD-10-CM

## 2024-02-06 PROCEDURE — 97110 THERAPEUTIC EXERCISES: CPT

## 2024-02-06 PROCEDURE — 97112 NEUROMUSCULAR REEDUCATION: CPT

## 2024-02-06 PROCEDURE — 97535 SELF CARE MNGMENT TRAINING: CPT

## 2024-02-06 NOTE — PROGRESS NOTES
[facility]  Occupational Therapy Progress and Treatment Note - Therapeutic Yoga Progam    Date: 2/6/2024  Name: Lukasz Person  Clinic Number: [id number]    Therapy Diagnosis:   Encounter Diagnoses   Name Primary?    Physical deconditioning Yes    Stress and adjustment reaction        Physician: Betina Valdovinos, *    Physician Orders: Eval and Treat   Medical Diagnosis from Referral: Chronic low back pain [M54.50, G89.29], Poor posture [R29.3], Esophageal adenocarcinoma [C15.9]  Evaluation Date: 8/22/2023  Authorization Period Expiration: 1/31/24  Plan of Care Expiration: 05/22/24  Progress Note Due: 3/6/24  Visit # / Visits authorized: 10/20     Time in:     9:30 am  Time Out:   10:15 am  Total Billable Time: 45 minutes    SUBJECTIVE     Pt reports: I am feeling good and have done the exercises.  I scheduled PT to address my shoulder injury.  He was compliant with home exercise program given last session.   Response to previous treatment: good  Functional change: easier to get up and down from a chair    Pain:  1/10 in left shoulder due to fall      OBJECTIVE     Objective Measures updated at progress report unless specified.    Patient Specific Functional Scale:           Activity 8/22/23 11/1/23 12/6/23 1/5/24 2/6/24     Poor balance 5    5  6      7     8   2.  Carrying 20 pounds or more 7     6  7       7      8   3.  Challenge with stairs 5     6  6      7     8   4.  Endurance  4      6  7      7      8   5.               6.             SCORE 5.25    5.75    6.5     7.0    8.0      Total Score = Sum of activity scores / number of activities  Minimum Detectable Change (90% CII) for average score = 2 points  Minimum Detectable Change (90% CI) for single activity score = 3 points       Treatment     Lukasz received the treatments listed below:       Date 11/1/23 11/10/23 11/22/23 11/29/23 12/6/23 1/5/24 1/9/24 1/23/24 2/6/23   Therapeutic Yoga Exercises / Neuromuscular  Re-education 30 minutes  30 minutes 30 minutes  30 minutes  45 minutes  PN  30 minutes  PN 30 minutes 30 minutes 30 minutes   Seated Yoga   chair yoga:  Cat-Cow,forward bend, back bend, twist,  chair yoga:  Cat-Cow,forward bend, back bend, twist, figure 4 chair yoga:  Cat-Cow,forward bend, back bend, twist, figure 4  chair yoga:  Cat-Cow,forward bend, back bend, twist, figure 4 chair yoga:  Cat-Cow,forward bend, back bend, twist, figure 4 chair yoga:  Cat-Cow,forward bend, back bend, twist, figure 4  chair yoga:  Cat-Cow,forward bend, back bend, twist,    Quadruped     Camel wih chair in high kneel    Cat-cow   Supine Twist x 2, knees to chest, Chair pose with block 2 x 5, Twist x 2,( eagle and wide feet),  knees to chest, happy baby flow, knee to chest flow, figure 4, bound angle Chair pose with block 2 x 5, Twist x 2,( eagle and wide feet),  knees to chest, happy baby flow, knee to chest flow, figure 4, bound angle Chair pose with block 3 x 5, Twist x 2,( eagle and wide feet),  knees to chest, happy baby flow, knee to chest flow, figure 4, bound angle, bridge x 1 with block, Modified boat pose with block 3 x 5, Twist x 2,( eagle and wide feet),  knees to chest, happy baby flow, knee to chest flow, figure 4, rolling on small ball, right QL area), for self manual muscle release-HEP Hamstring release with belt,Modified boat pose with block 3 x 5, Twist x 2,( eagle and wide feet),  knees to chest, happy baby flow, knee to chest flow, figure 4,  amstring release with belt,Modified boat pose with block 3 x 5, Twist x 2,( eagle and wide feet),  knees to chest, happy baby flow, knee to chest flow, figure 4,  hamstring release with belt,Modified boat pose with block and head lifted, 3 x 5, Twist x 2,( eagle and wide feet),  knees to chest, happy baby flow, knee to chest flow, figure 4, leg lifts, bridge x 2, hamstring release with belt,Modified boat pose with block and head lifted, 3 x 5, Twist x 2,( eagle and wide feet),   knees to chest, happy baby flow, knee to chest flow, figure 4, leg lifts, bridge x 2   Prone        sphinx sphinx   standing Warrior 2, chair pose/vilcano x3,   Warrior 2, chair pose/vilcano x3,  Warrior 2, triangle with chair and block, chair pose/vilcano x3, wide straddle with hands on chair,  Warrior 2, chair pose/volcano x3, wide straddle with hands on chair, back extention at wall, twist with wall and chair, Warrior 2, chair pose/volcano x3, wide straddle with hands on chair, standing back ext with hands on sacrum and heels pulling together,  chair pose/volcano x3, wide straddle with hands on chair, standing back ext with hands on sacrum and heels pulling together, transfer floor to stand with supervision and chair, hair pose/volcano x3, wide straddle with hands on chair, standing back ext with hands on sacrum and heels pulling together, transfer floor to stand without chair, chair pose/volcano x2, wide straddle with hands on chair, standing back ext with hands on sacrum and heels pulling together,                            Self-Care/Home Management  / Therapeutic Activities 15 minutes  15 minutes  15 minutes  15 minutes  15 minutes  15 minutes 15 minutes 15 minutes                Relaxation techniques DB, body scan,    DB,body scan, DB,body scan, Seated metta  practice DB,body scan,  DB,body scan, metta  DB,body scan, metta     Restorative  Supine with legs on chair Supine with legs on chair and bolster under hips- this caused reflux reaction Supine with legs on chair and bolster under hips- this caused reflux reaction Supine bridge and fish with blocks,  Supine with bolster unde knees Bridge and bound angle with bolsters Bridge and bound angle with bolsters    Activity Pacing                                    Stress Management/Education  - physiology of yoga/meditation and immune health - physiology of yoga/meditation and immune health  physiology of yoga/meditation and immune health physiology of  yoga/meditation and immune health - physiology of yoga/meditation and immune health - physiology of yoga/meditation and immune health - physiology of yoga/meditation and immune health physiology of yoga/meditation and immune health                    Patient Education and Home Exercises      Education provided:   - - physiology of yoga/meditation and immune health  - Progress towards goals     Written Home Exercises Provided: yes.  Exercises were reviewed and Lukasz was able to demonstrate them prior to the end of the session.  Lukasz demonstrated good  understanding of the HEP provided. See EMR under Patient Instructions for exercises provided during therapy sessions.       Assessment      In today's session, Lukasz reviewed his HEP ocusing  on strengthening, stretching and relaxation techniques. We continued hamstring stretch in supine with strap. He required moderate verbal and neuromuscular cues. He was taught stretch and self release for right hip pain.    Lukasz is progressing well towards his goals and patient prognosis is Good.    Progress Update: Lukasz is making good progress towards his goals.  His PSFS score increased from   7.0 to 8.0 indicating increased functional ability.  He reports his balance and endurance are improving with yoga exercise. He is able to transfer from stand to floor without using a chair. He also reports he is using prayer and breathing techniques to help with stress/immune health.   Patient will continue to benefit from skilled outpatient occupational therapy to address the deficits listed in the problem list on initial evaluation provide pt/family education and to maximize pt's level of independence in the home and community environment. Pt's spiritual, cultural and educational needs considered and pt agreeable to plan of care and goals.    Anticipated barriers to occupational therapy: none    Goals:  Short Term Goals: 6 weeks      Goal # Goal Status   1 Patient will  demonstrate independence with diaphragmatic breathing in sit and supine. met   2 Pt. will identify resource for audio body scan to assist with stress management/immune health met   3 Patient will identify 2 new stress coping skills for stress management/immune health. met   4 Patient will identify activity pacing problems.  Patient will then implement new plan for daily activity to increase endurance for ADL's. Progressing      Long Term Goals: 12 weeks      Goal # Goal Status   1 Patient will demonstrate independence with yoga Home Exercise Program to increase strength and endurance for ADL's. Progressing   2 Patient will demonstrate independence with relaxation techniques to manage stress for immune health. Progressing   3 Patient will verbalize good understanding of stress/immune health relationship.  Progressing   4            PLAN     Plan of care Certification: 2/6/2024 to 1/22/24.    Outpatient Occupational Therapy 1 times weekly for 14 weeks to include the following interventions: Neuromuscular Re-ed, Patient Education, Self Care, Therapeutic Activities, and Therapeutic Exercise.     Joanna Kimball, OT

## 2024-02-07 ENCOUNTER — PATIENT MESSAGE (OUTPATIENT)
Dept: CARDIOLOGY | Facility: CLINIC | Age: 69
End: 2024-02-07
Payer: MEDICARE

## 2024-02-07 NOTE — PROGRESS NOTES
[facility]    02/07/2024 10:45 AM    Lukasz Person (68 y.o.) is followed by the [facility].      Assessment/Plan:    Lukasz Person presents today with subtherapeutic  INR. Goal INR 2.0-3.0    Assessment of patient findings per MA/LPN and chart review:   The following significant findings were found:  Patient self-reports taking 6 mg once daily, which is higher than previously instructed.     Recommendation for patient's warfarin regimen:   Due to subtherapeutic INR, patient was instructed to take an 8 mg booster dose today (2/07).   The weekly warfarin dose was increased due to repeated subtherapeutic INRs New regimen is 8 mg on Friday, and 6 mg on all other days (Sat-Thurs).     Recommended repeat INR in 1 week      Yoel Daniels, PharmD.   Preferred Contact: Secure Messaging or In Basket Message

## 2024-02-08 ENCOUNTER — CLINICAL SUPPORT (OUTPATIENT)
Dept: REHABILITATION | Facility: HOSPITAL | Age: 69
End: 2024-02-08
Payer: MEDICARE

## 2024-02-08 DIAGNOSIS — S46.012A TRAUMATIC TEAR OF LEFT ROTATOR CUFF, UNSPECIFIED TEAR EXTENT, INITIAL ENCOUNTER: Primary | ICD-10-CM

## 2024-02-08 PROCEDURE — 97110 THERAPEUTIC EXERCISES: CPT

## 2024-02-08 NOTE — PROGRESS NOTES
[facility]  Occupational Therapy Treatment Note     Date: 2/8/2024  Name: Lukasz Person  Clinic Number: [id number]      Therapy Diagnosis:   Encounter Diagnosis   Name Primary?    Traumatic tear of left rotator cuff, unspecified tear extent, initial encounter Yes     Physician: Kirk Wilson MD    Physician Orders:  eval and treat   Medical Diagnosis: S46.012A (ICD-10-CM) - Traumatic tear of left rotator cuff, unspecified tear extent, initial encounter   Surgical Procedure/ Date :reports no shoulder sx   Evaluation Date: 1/25/2024  Insurance:Medicare   Insurance Authorization period Expiration: 1/21/25   Plan of Care Certification Period: 4/30/24      Visit # / Visits Authortized: 1 / 20   Time In:9:30 am   Time Out: 10;30 am   Total Billable Time: 60 minutes       Precautions: Standard, needs  to have head elevated in supine due to esophageal cancer and reconstruction     Subjective     Patient reports: no pain  He was compliant with home exercise program given last session.   Response to previous treatment:first tx  Functional change: none to be noted to this date     Pain: 0/10  Location: left shoulder      Objective     Objective Measures updated at progress report unless specified.    Active Range of Motion: Measured in degrees     Shoulder Right Left    Flexion 155 130   Abduction 130 130   ER at 0 80 60   ER at 90 60  60    IR 60 60         Shoulder Right   Shoulder flexion: 4/5   Shoulder Abduction: 4/5   Shoulder ER 4/5   Shoulder IR 4/5   Lower Trap 5/5   Middle Trap 5/5   Rhomboids 5/5         Scapular Control/Dyskinesis:     Normal / Subtle / Obvious  Comments    Left  Protracted      Right  Protracted       He reports that he does sleep         Palpation Shoulder:     Lateral shoulder pain                CMS Impairment/Limitation/Restriction for FOTO initial  Survey     Therapist reviewed FOTO scores for Lukasz Person on 1/25/2024.   FOTO documents entered into EPIC - see Media  section.      17 intake score           Treatment     Lukasz received the treatments listed below:        therapeutic exercises to develop strength for 35 minutes including:  - un weighted dowel flexion, abduction, scaption, ER x 10 reps (3 sets)   - shoulder circles forward/backward x 10 reps (2 sets)         hot pack for 10 minutes to R hand .        Patient Education and Home Exercises     Education provided:   - added table slides with HEP   - Progress towards goals     Written Home Exercises Provided: Patient instructed to cont prior HEP.  Exercises were reviewed and Lukasz was able to demonstrate them prior to the end of the session.  Lukasz demonstrated good  understanding of the home exercise program provided. See electronic medical record under Patient Instructions for exercises provided during therapy sessions.       Assessment     Good debra today. Good ROM no pain     Lukasz is progressing well towards his goals and there are no updates to goals at this time. Pt prognosis is Good.     Patient will continue to benefit from skilled outpatient occupational therapy to address the deficits listed in the problem list on initial evaluation provide patient/family education and to maximize patient's level of independence in the home and community environment.     Patient's spiritual, cultural and educational needs considered and patient agreeable to plan of care and goals.    Anticipated barriers to occupational therapy:     Goals:  Short Term (4  weeks on 2/29/24 ):  1)   Patient to be IND with HEP and modalities for pain management  2)   Increase ROM 15 degrees in left shoulder motion to increase functional UE use for  shoulder flexion, scaption and ER   3)  Improve muscle strength ing 4/+5 by shoulder motions  in order to carry items with left shoulder .      Long Term (by discharge):  1)   Pt will report 0 out of 10 pain with left shoulder . Reaching forward or overhead into closets .  2)   Patient to  increase score by  10 on FOTO to demonstrate improved perception of functional left use.  3)   Pt will return to prior level of function for ADLs and household management.     Plan     Updates/Grading for next session:     Ashlyn Crespo OT   2/8/2024

## 2024-02-14 ENCOUNTER — CLINICAL SUPPORT (OUTPATIENT)
Dept: REHABILITATION | Facility: HOSPITAL | Age: 69
End: 2024-02-14
Payer: MEDICARE

## 2024-02-14 DIAGNOSIS — R53.81 PHYSICAL DECONDITIONING: Primary | ICD-10-CM

## 2024-02-14 DIAGNOSIS — F43.29 STRESS AND ADJUSTMENT REACTION: ICD-10-CM

## 2024-02-14 PROCEDURE — 97166 OT EVAL MOD COMPLEX 45 MIN: CPT

## 2024-02-14 PROCEDURE — 97112 NEUROMUSCULAR REEDUCATION: CPT

## 2024-02-14 PROCEDURE — 97535 SELF CARE MNGMENT TRAINING: CPT

## 2024-02-14 RX ORDER — LOSARTAN POTASSIUM 25 MG/1
25 TABLET ORAL DAILY
Qty: 90 TABLET | Refills: 3 | Status: SHIPPED | OUTPATIENT
Start: 2024-02-14 | End: 2024-03-01 | Stop reason: SDUPTHER

## 2024-02-14 NOTE — PROGRESS NOTES
[facility]  Occupational Therapy Treatment Note - Therapeutic Yoga Progam    Date: 2/14/2024  Name: Lukasz Person  Clinic Number: [id number]    Therapy Diagnosis:   Encounter Diagnoses   Name Primary?    Physical deconditioning Yes    Stress and adjustment reaction        Physician: Betina Valdovinos, *    Physician Orders: Eval and Treat   Medical Diagnosis from Referral: Chronic low back pain [M54.50, G89.29], Poor posture [R29.3], Esophageal adenocarcinoma [C15.9]  Evaluation Date: 8/22/2023  Authorization Period Expiration: 1/31/24  Plan of Care Expiration: 05/22/24  Progress Note Due: 3/6/24  Visit # / Visits authorized: 11/20     Time in:     10:30 am  Time Out:   11:15 am  Total Billable Time: 45 minutes    SUBJECTIVE     Pt reports: I am feeling good and have done the exercises.  I started PT for the rotator cuff problem  He was compliant with home exercise program given last session.   Response to previous treatment: good  Functional change: easier to get up and down from a chair    Pain:  1/10 in left shoulder due to fall      OBJECTIVE     Objective Measures updated at progress report unless specified.    Patient Specific Functional Scale:           Activity 8/22/23 11/1/23 12/6/23 1/5/24 2/6/24     Poor balance 5    5  6      7     8   2.  Carrying 20 pounds or more 7     6  7       7      8   3.  Challenge with stairs 5     6  6      7     8   4.  Endurance  4      6  7      7      8   5.               6.             SCORE 5.25    5.75    6.5     7.0    8.0      Total Score = Sum of activity scores / number of activities  Minimum Detectable Change (90% CII) for average score = 2 points  Minimum Detectable Change (90% CI) for single activity score = 3 points       Treatment     Lukasz received the treatments listed below:       Date 11/1/23 11/10/23 11/22/23 11/29/23 12/6/23 1/5/24 1/9/24 1/23/24 2/6/23 2/14/23   Therapeutic Yoga Exercises / Neuromuscular Re-education  Patient returned call. Please call patient.   30 minutes  30 minutes 30 minutes  30 minutes  45 minutes  PN  30 minutes  PN 30 minutes 30 minutes 30 minutes    Seated Yoga   chair yoga:  Cat-Cow,forward bend, back bend, twist,  chair yoga:  Cat-Cow,forward bend, back bend, twist, figure 4 chair yoga:  Cat-Cow,forward bend, back bend, twist, figure 4  chair yoga:  Cat-Cow,forward bend, back bend, twist, figure 4 chair yoga:  Cat-Cow,forward bend, back bend, twist, figure 4 chair yoga:  Cat-Cow,forward bend, back bend, twist, figure 4  chair yoga:  Cat-Cow,forward bend, back bend, twist,  chair yoga:  Cat-Cow,forward bend, back bend, twist, figure 4, side body stretch,    Quadruped     Camel wih chair in high kneel    Cat-cow    Supine Twist x 2, knees to chest, Chair pose with block 2 x 5, Twist x 2,( eagle and wide feet),  knees to chest, happy baby flow, knee to chest flow, figure 4, bound angle Chair pose with block 2 x 5, Twist x 2,( eagle and wide feet),  knees to chest, happy baby flow, knee to chest flow, figure 4, bound angle Chair pose with block 3 x 5, Twist x 2,( eagle and wide feet),  knees to chest, happy baby flow, knee to chest flow, figure 4, bound angle, bridge x 1 with block, Modified boat pose with block 3 x 5, Twist x 2,( eagle and wide feet),  knees to chest, happy baby flow, knee to chest flow, figure 4, rolling on small ball, right QL area), for self manual muscle release-HEP Hamstring release with belt,Modified boat pose with block 3 x 5, Twist x 2,( eagle and wide feet),  knees to chest, happy baby flow, knee to chest flow, figure 4,  amstring release with belt,Modified boat pose with block 3 x 5, Twist x 2,( eagle and wide feet),  knees to chest, happy baby flow, knee to chest flow, figure 4,  hamstring release with belt,Modified boat pose with block and head lifted, 3 x 5, Twist x 2,( eagle and wide feet),  knees to chest, happy baby flow, knee to chest flow, figure 4, leg lifts, bridge x 2, hamstring release with belt,Modified boat  pose with block and head lifted, 3 x 5, Twist x 2,( eagle and wide feet),  knees to chest, happy baby flow, knee to chest flow, figure 4, leg lifts, bridge x 2 hamstring release with belt,Modified boat pose with block and head lifted, 3 x 5, Twist x 2,( eagle and wide feet),  knees to chest, happy baby flow, knee to chest flow, figure 4, leg lifts, bridge x 2, bound angle x 5 breaths,             Prone        sphinx sphinx Sphinx x 2   standing Warrior 2, chair pose/vilcano x3,   Warrior 2, chair pose/vilcano x3,  Warrior 2, triangle with chair and block, chair pose/vilcano x3, wide straddle with hands on chair,  Warrior 2, chair pose/volcano x3, wide straddle with hands on chair, back extention at wall, twist with wall and chair, Warrior 2, chair pose/volcano x3, wide straddle with hands on chair, standing back ext with hands on sacrum and heels pulling together,  chair pose/volcano x3, wide straddle with hands on chair, standing back ext with hands on sacrum and heels pulling together, transfer floor to stand with supervision and chair, hair pose/volcano x3, wide straddle with hands on chair, standing back ext with hands on sacrum and heels pulling together, transfer floor to stand without chair, chair pose/volcano x2, wide straddle with hands on chair, standing back ext with hands on sacrum and heels pulling together,  chair pose/volcano x2, wide straddle with hands on chair, standing back ext with hands on sacrum and heels pulling together, warrior 2, side angle with chair,                              Self-Care/Home Management  / Therapeutic Activities 15 minutes  15 minutes  15 minutes  15 minutes  15 minutes  15 minutes 15 minutes 15 minutes  15 minutes                Relaxation techniques DB, body scan,    DB,body scan, DB,body scan, Seated metta  practice DB,body scan,  DB,body scan, metta  DB,body scan, metta   DB,body scan, metta    Restorative  Supine with legs on chair Supine with legs on chair and  bolster under hips- this caused reflux reaction Supine with legs on chair and bolster under hips- this caused reflux reaction Supine bridge and fish with blocks,  Supine with bolster unde knees Bridge and bound angle with bolsters Bridge and bound angle with bolsters  Supine in crossed ankle with legs on 2 stacked bolsters   Activity Pacing                                       Stress Management/Education  - physiology of yoga/meditation and immune health - physiology of yoga/meditation and immune health  physiology of yoga/meditation and immune health physiology of yoga/meditation and immune health - physiology of yoga/meditation and immune health - physiology of yoga/meditation and immune health - physiology of yoga/meditation and immune health physiology of yoga/meditation and immune health  - physiology of yoga/meditation and immune health                    Patient Education and Home Exercises      Education provided:   - - physiology of yoga/meditation and immune health  - Progress towards goals     Written Home Exercises Provided: yes.  Exercises were reviewed and Lukasz was able to demonstrate them prior to the end of the session.  Lukasz demonstrated good  understanding of the HEP provided. See EMR under Patient Instructions for exercises provided during therapy sessions.       Assessment      In today's session, Lukasz reviewed his HEP ocusing  on strengthening, stretching and relaxation techniques. We focused on standing poses today to build strength and balance.  He required moderate verbal and neuromuscular cues. He reviewed stretch and self release for right hip pain.    Lukasz is progressing well towards his goals and patient prognosis is Good.    Progress Update: Lukasz is making good progress towards his goals.  His PSFS score increased from   7.0 to 8.0 indicating increased functional ability.  He reports his balance and endurance are improving with yoga exercise. He is able to transfer from  stand to floor without using a chair. He also reports he is using prayer and breathing techniques to help with stress/immune health.   Patient will continue to benefit from skilled outpatient occupational therapy to address the deficits listed in the problem list on initial evaluation provide pt/family education and to maximize pt's level of independence in the home and community environment. Pt's spiritual, cultural and educational needs considered and pt agreeable to plan of care and goals.    Anticipated barriers to occupational therapy: none    Goals:  Short Term Goals: 6 weeks      Goal # Goal Status   1 Patient will demonstrate independence with diaphragmatic breathing in sit and supine. met   2 Pt. will identify resource for audio body scan to assist with stress management/immune health met   3 Patient will identify 2 new stress coping skills for stress management/immune health. met   4 Patient will identify activity pacing problems.  Patient will then implement new plan for daily activity to increase endurance for ADL's. Progressing      Long Term Goals: 12 weeks      Goal # Goal Status   1 Patient will demonstrate independence with yoga Home Exercise Program to increase strength and endurance for ADL's. Progressing   2 Patient will demonstrate independence with relaxation techniques to manage stress for immune health. Progressing   3 Patient will verbalize good understanding of stress/immune health relationship.  Progressing   4            PLAN     Plan of care Certification: 2/14/2024 to 1/22/24.    Outpatient Occupational Therapy 1 times weekly for 14 weeks to include the following interventions: Neuromuscular Re-ed, Patient Education, Self Care, Therapeutic Activities, and Therapeutic Exercise.     Joanna Kimball, OT

## 2024-02-15 ENCOUNTER — ANTI-COAG VISIT (OUTPATIENT)
Dept: CARDIOLOGY | Facility: CLINIC | Age: 69
End: 2024-02-15
Payer: MEDICARE

## 2024-02-15 ENCOUNTER — LAB VISIT (OUTPATIENT)
Dept: LAB | Facility: HOSPITAL | Age: 69
End: 2024-02-15
Attending: INTERNAL MEDICINE
Payer: MEDICARE

## 2024-02-15 ENCOUNTER — CLINICAL SUPPORT (OUTPATIENT)
Dept: REHABILITATION | Facility: HOSPITAL | Age: 69
End: 2024-02-15
Payer: MEDICARE

## 2024-02-15 DIAGNOSIS — Z79.01 LONG TERM (CURRENT) USE OF ANTICOAGULANTS: ICD-10-CM

## 2024-02-15 DIAGNOSIS — I48.0 PAROXYSMAL ATRIAL FIBRILLATION: Primary | Chronic | ICD-10-CM

## 2024-02-15 DIAGNOSIS — M25.512 LEFT SHOULDER PAIN, UNSPECIFIED CHRONICITY: Primary | ICD-10-CM

## 2024-02-15 PROBLEM — M25.519 SHOULDER PAIN: Status: ACTIVE | Noted: 2024-02-15

## 2024-02-15 PROBLEM — S46.012A TRAUMATIC TEAR OF LEFT ROTATOR CUFF: Status: ACTIVE | Noted: 2024-02-15

## 2024-02-15 LAB
INR PPP: 4.6 (ref 0.8–1.2)
PROTHROMBIN TIME: 44.7 SEC (ref 9–12.5)

## 2024-02-15 PROCEDURE — 97140 MANUAL THERAPY 1/> REGIONS: CPT

## 2024-02-15 PROCEDURE — 93793 ANTICOAG MGMT PT WARFARIN: CPT | Mod: ,,,

## 2024-02-15 PROCEDURE — 36415 COLL VENOUS BLD VENIPUNCTURE: CPT | Performed by: INTERNAL MEDICINE

## 2024-02-15 PROCEDURE — 97110 THERAPEUTIC EXERCISES: CPT

## 2024-02-15 PROCEDURE — 85610 PROTHROMBIN TIME: CPT | Performed by: INTERNAL MEDICINE

## 2024-02-15 NOTE — PROGRESS NOTES
[facility]  Occupational Therapy Treatment Note     Date: 2/15/2024  Name: Lukasz Person  Clinic Number: [id number]      Therapy Diagnosis:   No diagnosis found.    Physician: Kirk Wilson MD    Physician Orders:  eval and treat   Medical Diagnosis: S46.012A (ICD-10-CM) - Traumatic tear of left rotator cuff, unspecified tear extent, initial encounter   Surgical Procedure/ Date :reports no shoulder sx   Evaluation Date: 1/25/2024  Insurance:Medicare   Insurance Authorization period Expiration: 1/21/25   Plan of Care Certification Period: 4/30/24      Visit # / Visits Authortized: 3 / 20   Time In:10;00 am   Time Out: 11:00  am   Total Billable Time: 60 minutes       Precautions: Standard, needs  to have head elevated in supine due to esophageal cancer and reconstruction     Subjective     Patient reports: no pain  He was compliant with home exercise program given last session.   Response to previous treatment:first tx  Functional change: none to be noted to this date     Pain: 0/10  Location: left shoulder      Objective     Objective Measures updated at progress report unless specified.    Active Range of Motion: Measured in degrees     Shoulder Right Left    Flexion 155 130   Abduction 130 130   ER at 0 80 60   ER at 90 60  60    IR 60 60         Shoulder Right   Shoulder flexion: 4/5   Shoulder Abduction: 4/5   Shoulder ER 4/5   Shoulder IR 4/5   Lower Trap 5/5   Middle Trap 5/5   Rhomboids 5/5         Scapular Control/Dyskinesis:     Normal / Subtle / Obvious  Comments    Left  Protracted      Right  Protracted       He reports that he does sleep         Palpation Shoulder:     Lateral shoulder pain                CMS Impairment/Limitation/Restriction for FOTO initial  Survey     Therapist reviewed FOTO scores for Lukasz Person on 1/25/2024.   FOTO documents entered into EPIC - see Media section.      17 intake score           Treatment     Lukasz received the treatments listed below:        Manual  therapy X 10 minutes Shoulder approximation with isometrics       therapeutic exercises to develop strength for 35 minutes including:  - un weighted dowel flexion, abduction, scaption, ER x 10 reps (3 sets)   - shoulder circles forward/backward x 10 reps (2 sets)     Issued isometrics for shoulder on this visit     hot pack for 10 minutes to R hand .        Patient Education and Home Exercises     Education provided:   - added table slides with HEP   - Progress towards goals     Written Home Exercises Provided: Patient instructed to cont prior HEP.  Exercises were reviewed and Lukasz was able to demonstrate them prior to the end of the session.  Lukasz demonstrated good  understanding of the home exercise program provided. See electronic medical record under Patient Instructions for exercises provided during therapy sessions.       Assessment     Good debra today. Good ROM no pain     Lukasz is progressing well towards his goals and there are no updates to goals at this time. Pt prognosis is Good.     Patient will continue to benefit from skilled outpatient occupational therapy to address the deficits listed in the problem list on initial evaluation provide patient/family education and to maximize patient's level of independence in the home and community environment.     Patient's spiritual, cultural and educational needs considered and patient agreeable to plan of care and goals.      Anticipated barriers to occupational therapy:     Goals:  Short Term (4  weeks on 2/29/24 ):  1)   Patient to be IND with HEP and modalities for pain management  2)   Increase ROM 15 degrees in left shoulder motion to increase functional UE use for  shoulder flexion, scaption and ER   3)  Improve muscle strength ing 4/+5 by shoulder motions  in order to carry items with left shoulder .      Long Term (by discharge):  1)   Pt will report 0 out of 10 pain with left shoulder . Reaching forward or overhead into closets .  2)    Patient to increase score by  10 on FOTO to demonstrate improved perception of functional left use.  3)   Pt will return to prior level of function for ADLs and household management.     Plan     Updates/Grading for next session:     Sudha Murillo OT   2/15/2024

## 2024-02-16 NOTE — PROGRESS NOTES
[facility]    02/16/2024 9:29 AM    Lukasz Person (68 y.o.) is followed by the [facility].      Assessment/Plan:    Lukasz Person presents today with supratherapeutic INR. Goal INR 2.0-3.0    Assessment of patient findings per MA/LPN and chart review:   The following significant findings were found:  Self reports taking warfarin as instructed on calendar with no signs and symptoms of bleeding.   Self reports no recent changes in diet, lifestyle, or medications.     Recommendation for patient's warfarin regimen:   Due to supratherapeutic INR, patient was instructed to SKIP their next two warfarin doses (2/16-2/17) and continue with weekly regimen as per calendar..     Recommended repeat INR in 4 days      Yoel Daniels, PharmD.   Preferred Contact: Secure Messaging or In Basket Message

## 2024-02-20 ENCOUNTER — CLINICAL SUPPORT (OUTPATIENT)
Dept: REHABILITATION | Facility: HOSPITAL | Age: 69
End: 2024-02-20
Payer: MEDICARE

## 2024-02-20 ENCOUNTER — OFFICE VISIT (OUTPATIENT)
Dept: OPTOMETRY | Facility: CLINIC | Age: 69
End: 2024-02-20
Payer: MEDICARE

## 2024-02-20 ENCOUNTER — ANTI-COAG VISIT (OUTPATIENT)
Dept: CARDIOLOGY | Facility: CLINIC | Age: 69
End: 2024-02-20
Payer: MEDICARE

## 2024-02-20 ENCOUNTER — PATIENT MESSAGE (OUTPATIENT)
Dept: CARDIOLOGY | Facility: CLINIC | Age: 69
End: 2024-02-20

## 2024-02-20 DIAGNOSIS — H25.13 NUCLEAR SCLEROSIS OF BOTH EYES: ICD-10-CM

## 2024-02-20 DIAGNOSIS — R53.81 PHYSICAL DECONDITIONING: Primary | ICD-10-CM

## 2024-02-20 DIAGNOSIS — F43.29 STRESS AND ADJUSTMENT REACTION: ICD-10-CM

## 2024-02-20 DIAGNOSIS — I48.0 PAROXYSMAL ATRIAL FIBRILLATION: Primary | Chronic | ICD-10-CM

## 2024-02-20 DIAGNOSIS — E11.9 TYPE 2 DIABETES MELLITUS WITHOUT RETINOPATHY: ICD-10-CM

## 2024-02-20 DIAGNOSIS — H43.812 VITREOUS DETACHMENT, LEFT: Primary | ICD-10-CM

## 2024-02-20 PROCEDURE — 97535 SELF CARE MNGMENT TRAINING: CPT

## 2024-02-20 PROCEDURE — 99213 OFFICE O/P EST LOW 20 MIN: CPT | Mod: PBBFAC,PO | Performed by: OPTOMETRIST

## 2024-02-20 PROCEDURE — 99999 PR PBB SHADOW E&M-EST. PATIENT-LVL III: CPT | Mod: PBBFAC,,, | Performed by: OPTOMETRIST

## 2024-02-20 PROCEDURE — 92015 DETERMINE REFRACTIVE STATE: CPT | Mod: ,,, | Performed by: OPTOMETRIST

## 2024-02-20 PROCEDURE — 93793 ANTICOAG MGMT PT WARFARIN: CPT | Mod: ,,,

## 2024-02-20 PROCEDURE — 97110 THERAPEUTIC EXERCISES: CPT

## 2024-02-20 PROCEDURE — 92014 COMPRE OPH EXAM EST PT 1/>: CPT | Mod: S$PBB,,, | Performed by: OPTOMETRIST

## 2024-02-20 PROCEDURE — 97112 NEUROMUSCULAR REEDUCATION: CPT

## 2024-02-20 NOTE — PROGRESS NOTES
[facility]ERAP[facility] AND [facility]  Occupational Therapy Treatment Note - Therapeutic Yoga Progam    Date: 2/20/2024  Name: Lukasz Person  Clinic Number: [id number]    Therapy Diagnosis:   Encounter Diagnoses   Name Primary?    Physical deconditioning Yes    Stress and adjustment reaction        Physician: Betina Valdovinos, *    Physician Orders: Eval and Treat   Medical Diagnosis from Referral: Chronic low back pain [M54.50, G89.29], Poor posture [R29.3], Esophageal adenocarcinoma [C15.9]  Evaluation Date: 8/22/2023  Authorization Period Expiration: 1/31/24  Plan of Care Expiration: 05/22/24  Progress Note Due: 3/6/24  Visit # / Visits authorized: 12/20     Time in:     10:30 am  Time Out:   11:15 am  Total Billable Time: 45 minutes    SUBJECTIVE     Pt reports: I am feeling good and have done the exercises.  I started PT for the rotator cuff problem  He was compliant with home exercise program given last session.   Response to previous treatment: good  Functional change: easier to get up and down stairs.     Pain:  1/10 in left shoulder due to fall      OBJECTIVE     Objective Measures updated at progress report unless specified.    Patient Specific Functional Scale:           Activity 8/22/23 11/1/23 12/6/23 1/5/24 2/6/24     Poor balance 5    5  6      7     8   2.  Carrying 20 pounds or more 7     6  7       7      8   3.  Challenge with stairs 5     6  6      7     8   4.  Endurance  4      6  7      7      8   5.               6.             SCORE 5.25    5.75    6.5     7.0    8.0      Total Score = Sum of activity scores / number of activities  Minimum Detectable Change (90% CII) for average score = 2 points  Minimum Detectable Change (90% CI) for single activity score = 3 points       Treatment     Lukasz received the treatments listed below:       Date 1/5/24 1/9/24 1/23/24 2/6/23 2/14/23 2/20/24   Therapeutic Yoga Exercises / Neuromuscular Re-education  30 minutes  PN 30 minutes 30 minutes  30 minutes 30 minutes 30 minutes   Seated Yoga   chair yoga:  Cat-Cow,forward bend, back bend, twist, figure 4 chair yoga:  Cat-Cow,forward bend, back bend, twist, figure 4  chair yoga:  Cat-Cow,forward bend, back bend, twist,  chair yoga:  Cat-Cow,forward bend, back bend, twist, figure 4, side body stretch,     Quadruped    Cat-cow     Supine Hamstring release with belt,Modified boat pose with block 3 x 5, Twist x 2,( eagle and wide feet),  knees to chest, happy baby flow, knee to chest flow, figure 4,  amstring release with belt,Modified boat pose with block 3 x 5, Twist x 2,( eagle and wide feet),  knees to chest, happy baby flow, knee to chest flow, figure 4,  hamstring release with belt,Modified boat pose with block and head lifted, 3 x 5, Twist x 2,( eagle and wide feet),  knees to chest, happy baby flow, knee to chest flow, figure 4, leg lifts, bridge x 2, hamstring release with belt,Modified boat pose with block and head lifted, 3 x 5, Twist x 2,( eagle and wide feet),  knees to chest, happy baby flow, knee to chest flow, figure 4, leg lifts, bridge x 2 hamstring release with belt,Modified boat pose with block and head lifted, 3 x 5, Twist x 2,( eagle and wide feet),  knees to chest, happy baby flow, knee to chest flow, figure 4, leg lifts, bridge x 2, bound angle x 5 breaths,           hamstring release with sheet, Modified boat pose with block and head lifted, 2 x 5, Twist x 2,( eagle and wide feet),  knees to chest, happy baby flow, knee to chest flow,    Prone   sphinx sphinx Sphinx x 2 Sphinx/ sphinx plank x 3   standing Warrior 2, chair pose/volcano x3, wide straddle with hands on chair, standing back ext with hands on sacrum and heels pulling together,  chair pose/volcano x3, wide straddle with hands on chair, standing back ext with hands on sacrum and heels pulling together, transfer floor to stand with supervision and chair, hair pose/volcano x3, wide straddle with hands on chair, standing back ext  with hands on sacrum and heels pulling together, transfer floor to stand without chair, chair pose/volcano x2, wide straddle with hands on chair, standing back ext with hands on sacrum and heels pulling together,  chair pose/volcano x2, wide straddle with hands on chair, standing back ext with hands on sacrum and heels pulling together, warrior 2, side angle with chair,  chair pose/volcano x2, wide straddle with hands on chair, standing back ext with hands on sacrum and heels pulling together, warrior 2,                     Self-Care/Home Management  / Therapeutic Activities  15 minutes 15 minutes 15 minutes  15 minutes 15 minutes            Relaxation techniques DB,body scan,  DB,body scan, metta  DB,body scan, metta   DB,body scan, metta  DB,body scan, metta    Restorative  Supine with bolster unde knees Bridge and bound angle with bolsters Bridge and bound angle with bolsters  Supine in crossed ankle with legs on 2 stacked bolsters Supine on treatment table with legs up wall using 2 bolsters,   Activity Pacing                           Stress Management/Education  - physiology of yoga/meditation and immune health - physiology of yoga/meditation and immune health physiology of yoga/meditation and immune health  - physiology of yoga/meditation and immune health - physiology of yoga/meditation and immune health                Patient Education and Home Exercises      Education provided:   - - physiology of yoga/meditation and immune health  - Progress towards goals     Written Home Exercises Provided: yes.  Exercises were reviewed and Lukasz was able to demonstrate them prior to the end of the session.  Lukasz demonstrated good  understanding of the HEP provided. See EMR under Patient Instructions for exercises provided during therapy sessions.       Assessment      In today's session, Lukasz practiced his HEP focusing  on strengthening, stretching and relaxation techniques. We focused on standing poses today to  build strength and balance as well as abdominal engagement vilma all strengthening poses.  He required moderate verbal and neuromuscular cues.     Lukasz is progressing well towards his goals and patient prognosis is Good.    Progress Update: Lukasz is making good progress towards his goals.  His PSFS score increased from   7.0 to 8.0 indicating increased functional ability.  He reports his balance and endurance are improving with yoga exercise. He is able to transfer from stand to floor without using a chair. He also reports he is using prayer and breathing techniques to help with stress/immune health.   Patient will continue to benefit from skilled outpatient occupational therapy to address the deficits listed in the problem list on initial evaluation provide pt/family education and to maximize pt's level of independence in the home and community environment. Pt's spiritual, cultural and educational needs considered and pt agreeable to plan of care and goals.    Anticipated barriers to occupational therapy: none    Goals:  Short Term Goals: 6 weeks      Goal # Goal Status   1 Patient will demonstrate independence with diaphragmatic breathing in sit and supine. met   2 Pt. will identify resource for audio body scan to assist with stress management/immune health met   3 Patient will identify 2 new stress coping skills for stress management/immune health. met   4 Patient will identify activity pacing problems.  Patient will then implement new plan for daily activity to increase endurance for ADL's. Progressing      Long Term Goals: 12 weeks      Goal # Goal Status   1 Patient will demonstrate independence with yoga Home Exercise Program to increase strength and endurance for ADL's. Progressing   2 Patient will demonstrate independence with relaxation techniques to manage stress for immune health. Progressing   3 Patient will verbalize good understanding of stress/immune health relationship.  Progressing   4             PLAN     Plan of care Certification: 2/20/2024 to 5/22/24.    Outpatient Occupational Therapy 1 times weekly for 14 weeks to include the following interventions: Neuromuscular Re-ed, Patient Education, Self Care, Therapeutic Activities, and Therapeutic Exercise.     Joanna Kimball OT

## 2024-02-20 NOTE — PROGRESS NOTES
HPI    67 Y/o male is here for routine eye exam with C/o  Pt denies pain and discomfort   Occ flashes OS     Eye med: NO GTT   Last edited by Pernell Ortega MA on 2/20/2024  1:23 PM.            Assessment /Plan     For exam results, see Encounter Report.    Vitreous detachment, left    Type 2 diabetes mellitus without retinopathy    Nuclear sclerosis of both eyes        DM--without retinopathy.  Advised yearly DFE  Cat OU--pt wishes new spex Rx--wants top for distance, and bottom for intermediate  Rare PVD photopsia OS resolving.  Discussed S+S of RD    PLAN:    Rtc 1 yr, or Pt to rtc immediately if inc F/F

## 2024-02-20 NOTE — PROGRESS NOTES
[facility]    02/20/2024 2:28 PM    Lukasz Person (68 y.o.) is followed by the "[facility] Anticoagulation Management Program.      Assessment/Plan:    Lukasz Person presents today with subtherapeutic  INR. Goal INR 2.0-3.0    Assessment of patient findings per MA/LPN and chart review:   The following significant findings were found:  None    Recommendation for patient's warfarin regimen:   Due to subtherapeutic INR, patient was instructed to take a booster dose today, but will resume their normal weekly dose.    Recommended repeat INR in 1 week      Melissa Kinsey, PharmD, BCPS  Clinical Pharmacist - [facility] Clinic  Preferred Contact: Secure Messaging or In Basket Message

## 2024-02-22 ENCOUNTER — CLINICAL SUPPORT (OUTPATIENT)
Dept: REHABILITATION | Facility: HOSPITAL | Age: 69
End: 2024-02-22
Payer: MEDICARE

## 2024-02-22 DIAGNOSIS — M25.512 LEFT SHOULDER PAIN, UNSPECIFIED CHRONICITY: Primary | ICD-10-CM

## 2024-02-22 PROCEDURE — 97140 MANUAL THERAPY 1/> REGIONS: CPT

## 2024-02-22 PROCEDURE — 97110 THERAPEUTIC EXERCISES: CPT

## 2024-02-22 NOTE — PROGRESS NOTES
[facility]  Occupational Therapy Treatment Note     Date: 2/22/2024  Name: Lukasz Person  Clinic Number: [id number]      Therapy Diagnosis: left shoulder pain       Physician: Kirk Wilson MD    Physician Orders:  eval and treat   Medical Diagnosis: S46.012A (ICD-10-CM) - Traumatic tear of left rotator cuff, unspecified tear extent, initial encounter   Surgical Procedure/ Date :reports no shoulder sx   Evaluation Date: 1/25/2024  Insurance:Medicare   Insurance Authorization period Expiration: 1/21/25   Plan of Care Certification Period: 4/30/24      Visit # / Visits Authortized: 3 / 20   Time In:10;00 am   Time Out: 11:00  am   Total Billable Time: 60 minutes       Precautions: Standard, needs to have head elevated in supine due to esophageal cancer and reconstruction     Subjective     Patient reports: no pain  He was compliant with home exercise program given last session.   Response to previous treatment:first tx  Functional change: none to be noted to this date     Pain: 0/10  Location: left shoulder      Objective     Objective Measures updated at progress report unless specified.    Active Range of Motion: Measured in degrees     Shoulder Right Left    Flexion 155 130   Abduction 130 130   ER at 0 80 60   ER at 90 60  60    IR 60 60         Shoulder Right   Shoulder flexion: 4/5   Shoulder Abduction: 4/5   Shoulder ER 4/5   Shoulder IR 4/5   Lower Trap 5/5   Middle Trap 5/5   Rhomboids 5/5         Scapular Control/Dyskinesis:     Normal / Subtle / Obvious  Comments    Left  Protracted      Right  Protracted       He reports that he does sleep         Palpation Shoulder:     Lateral shoulder pain                CMS Impairment/Limitation/Restriction for FOTO initial  Survey     Therapist reviewed FOTO scores for Lukasz Person on 1/25/2024.   FOTO documents entered into EPIC - see Media section.      17 intake score           Treatment     Lukasz received the treatments listed below:      Hot pack to left shoulder X 10 minutes      Manual  therapy X 10 minutes Shoulder approximation with isometrics       therapeutic exercises to develop strength for 35 minutes including:  - un weighted dowel flexion, abduction, scaption, ER x 10 reps (3 sets)   - shoulder circles forward/backward x 10 reps (2 sets)     Issued isometrics for shoulder on this visit    Wall slides 20 reps ( added to HEP )   Supine chest press 1-3# weight 20 reps ( added to HEP)           Patient Education and Home Exercises     Education provided:   - added table slides with HEP   - Progress towards goals     Written Home Exercises Provided: Patient instructed to cont prior HEP.  Exercises were reviewed and Lukasz was able to demonstrate them prior to the end of the session.  Lukasz demonstrated good  understanding of the home exercise program provided. See electronic medical record under Patient Instructions for exercises provided during therapy sessions.       Assessment     Good debra today. Good ROM no pain     Lukasz is progressing well towards his goals and there are no updates to goals at this time. Pt prognosis is Good.     Patient will continue to benefit from skilled outpatient occupational therapy to address the deficits listed in the problem list on initial evaluation provide patient/family education and to maximize patient's level of independence in the home and community environment.     Patient's spiritual, cultural and educational needs considered and patient agreeable to plan of care and goals.      Anticipated barriers to occupational therapy:     Goals:  Short Term (4  weeks on 2/29/24 ):  1)   Patient to be IND with HEP and modalities for pain management  2)   Increase ROM 15 degrees in left shoulder motion to increase functional UE use for  shoulder flexion, scaption and ER   3)  Improve muscle strength ing 4/+5 by shoulder motions  in order to carry items with left shoulder .      Long Term (by discharge):  1)    Pt will report 0 out of 10 pain with left shoulder . Reaching forward or overhead into closets .  2)   Patient to increase score by  10 on FOTO to demonstrate improved perception of functional left use.  3)   Pt will return to prior level of function for ADLs and household management.     Plan     Updates/Grading for next session:     Sudha Murillo OT   2/22/2024

## 2024-02-23 ENCOUNTER — PATIENT MESSAGE (OUTPATIENT)
Dept: CARDIOLOGY | Facility: CLINIC | Age: 69
End: 2024-02-23
Payer: MEDICARE

## 2024-02-26 ENCOUNTER — TELEPHONE (OUTPATIENT)
Dept: PODIATRY | Facility: CLINIC | Age: 69
End: 2024-02-26
Payer: MEDICARE

## 2024-02-27 ENCOUNTER — OFFICE VISIT (OUTPATIENT)
Dept: DERMATOLOGY | Facility: CLINIC | Age: 69
End: 2024-02-27
Payer: MEDICARE

## 2024-02-27 ENCOUNTER — CLINICAL SUPPORT (OUTPATIENT)
Dept: REHABILITATION | Facility: HOSPITAL | Age: 69
End: 2024-02-27
Payer: MEDICARE

## 2024-02-27 ENCOUNTER — ANTI-COAG VISIT (OUTPATIENT)
Dept: CARDIOLOGY | Facility: CLINIC | Age: 69
End: 2024-02-27
Payer: MEDICARE

## 2024-02-27 ENCOUNTER — OFFICE VISIT (OUTPATIENT)
Dept: PODIATRY | Facility: CLINIC | Age: 69
End: 2024-02-27
Payer: MEDICARE

## 2024-02-27 ENCOUNTER — LAB VISIT (OUTPATIENT)
Dept: LAB | Facility: HOSPITAL | Age: 69
End: 2024-02-27
Payer: MEDICARE

## 2024-02-27 VITALS
BODY MASS INDEX: 25.87 KG/M2 | HEIGHT: 72 IN | WEIGHT: 191 LBS | DIASTOLIC BLOOD PRESSURE: 67 MMHG | SYSTOLIC BLOOD PRESSURE: 131 MMHG | HEART RATE: 74 BPM

## 2024-02-27 VITALS — BODY MASS INDEX: 25.9 KG/M2 | WEIGHT: 191 LBS

## 2024-02-27 DIAGNOSIS — L82.1 SEBORRHEIC KERATOSES: ICD-10-CM

## 2024-02-27 DIAGNOSIS — I48.0 PAROXYSMAL ATRIAL FIBRILLATION: Primary | Chronic | ICD-10-CM

## 2024-02-27 DIAGNOSIS — E11.9 ENCOUNTER FOR DIABETIC FOOT EXAM: ICD-10-CM

## 2024-02-27 DIAGNOSIS — B35.3 TINEA PEDIS OF BOTH FEET: ICD-10-CM

## 2024-02-27 DIAGNOSIS — L57.0 MULTIPLE ACTINIC KERATOSES: Primary | ICD-10-CM

## 2024-02-27 DIAGNOSIS — L98.9 SKIN LESION: ICD-10-CM

## 2024-02-27 DIAGNOSIS — F43.29 STRESS AND ADJUSTMENT REACTION: ICD-10-CM

## 2024-02-27 DIAGNOSIS — R60.0 BILATERAL LOWER EXTREMITY EDEMA: ICD-10-CM

## 2024-02-27 DIAGNOSIS — E11.42 DIABETIC POLYNEUROPATHY ASSOCIATED WITH TYPE 2 DIABETES MELLITUS: Primary | ICD-10-CM

## 2024-02-27 DIAGNOSIS — L81.4 LENTIGINES: ICD-10-CM

## 2024-02-27 DIAGNOSIS — B35.1 ONYCHOMYCOSIS DUE TO DERMATOPHYTE: ICD-10-CM

## 2024-02-27 DIAGNOSIS — Z79.01 LONG TERM (CURRENT) USE OF ANTICOAGULANTS: ICD-10-CM

## 2024-02-27 DIAGNOSIS — L29.9 BRACHIORADIAL PRURITUS: ICD-10-CM

## 2024-02-27 DIAGNOSIS — R53.81 PHYSICAL DECONDITIONING: Primary | ICD-10-CM

## 2024-02-27 LAB
INR PPP: 3.7 (ref 0.8–1.2)
PROTHROMBIN TIME: 37 SEC (ref 9–12.5)

## 2024-02-27 PROCEDURE — 11721 DEBRIDE NAIL 6 OR MORE: CPT | Mod: PBBFAC,PN | Performed by: PODIATRIST

## 2024-02-27 PROCEDURE — 99213 OFFICE O/P EST LOW 20 MIN: CPT | Mod: PBBFAC,27,PO,25 | Performed by: DERMATOLOGY

## 2024-02-27 PROCEDURE — 17000 DESTRUCT PREMALG LESION: CPT | Mod: PBBFAC,PO | Performed by: DERMATOLOGY

## 2024-02-27 PROCEDURE — 85610 PROTHROMBIN TIME: CPT | Performed by: INTERNAL MEDICINE

## 2024-02-27 PROCEDURE — 99213 OFFICE O/P EST LOW 20 MIN: CPT | Mod: PBBFAC,PN,25 | Performed by: PODIATRIST

## 2024-02-27 PROCEDURE — 97110 THERAPEUTIC EXERCISES: CPT

## 2024-02-27 PROCEDURE — 17003 DESTRUCT PREMALG LES 2-14: CPT | Mod: S$PBB,,, | Performed by: DERMATOLOGY

## 2024-02-27 PROCEDURE — 97535 SELF CARE MNGMENT TRAINING: CPT

## 2024-02-27 PROCEDURE — 99999 PR PBB SHADOW E&M-EST. PATIENT-LVL III: CPT | Mod: PBBFAC,,, | Performed by: DERMATOLOGY

## 2024-02-27 PROCEDURE — 97112 NEUROMUSCULAR REEDUCATION: CPT

## 2024-02-27 PROCEDURE — 11721 DEBRIDE NAIL 6 OR MORE: CPT | Mod: S$PBB,Q9,, | Performed by: PODIATRIST

## 2024-02-27 PROCEDURE — 99214 OFFICE O/P EST MOD 30 MIN: CPT | Mod: 25,S$PBB,, | Performed by: PODIATRIST

## 2024-02-27 PROCEDURE — 99999 PR PBB SHADOW E&M-EST. PATIENT-LVL III: CPT | Mod: PBBFAC,,, | Performed by: PODIATRIST

## 2024-02-27 PROCEDURE — 99214 OFFICE O/P EST MOD 30 MIN: CPT | Mod: 25,S$PBB,, | Performed by: DERMATOLOGY

## 2024-02-27 PROCEDURE — 93793 ANTICOAG MGMT PT WARFARIN: CPT | Mod: ,,,

## 2024-02-27 PROCEDURE — 17000 DESTRUCT PREMALG LESION: CPT | Mod: S$PBB,,, | Performed by: DERMATOLOGY

## 2024-02-27 PROCEDURE — 36415 COLL VENOUS BLD VENIPUNCTURE: CPT | Mod: PO | Performed by: INTERNAL MEDICINE

## 2024-02-27 PROCEDURE — 17003 DESTRUCT PREMALG LES 2-14: CPT | Mod: PBBFAC,PO | Performed by: DERMATOLOGY

## 2024-02-27 RX ORDER — CLOTRIMAZOLE 1 %
CREAM (GRAM) TOPICAL DAILY
Qty: 45 G | Refills: 0 | Status: SHIPPED | OUTPATIENT
Start: 2024-02-27 | End: 2024-03-04 | Stop reason: SDUPTHER

## 2024-02-27 NOTE — PROGRESS NOTES
Subjective:      Patient ID: Lukasz Person is a 68 y.o. male.    Chief Complaint:   Diabetes Mellitus (1/29/24 - Maria Esther Palacios NP, PCP) and Routine Foot Care    Lukasz is a 68 y.o. male who returns to the clinic or evaluation and treatment of diabetic feet. Lukasz has a past medical history of Anticoagulant long-term use, Cancer, Diabetes mellitus, Hyperlipidemia, Hypertension, Kidney stones, and Sleep apnea.     Patient doing well  Using an ever type cream    PCP: Kirk Wilson MD    Date Last Seen by PCP: 1/29/24    Current shoe gear: Tennis shoes    Hemoglobin A1C   Date Value Ref Range Status   01/08/2024 6.2 (H) 4.0 - 5.6 % Final     Comment:     ADA Screening Guidelines:  5.7-6.4%  Consistent with prediabetes  >or=6.5%  Consistent with diabetes    High levels of fetal hemoglobin interfere with the HbA1C  assay. Heterozygous hemoglobin variants (HbS, HgC, etc)do  not significantly interfere with this assay.   However, presence of multiple variants may affect accuracy.     05/29/2023 6.2 (H) 4.0 - 5.6 % Final     Comment:     ADA Screening Guidelines:  5.7-6.4%  Consistent with prediabetes  >or=6.5%  Consistent with diabetes    High levels of fetal hemoglobin interfere with the HbA1C  assay. Heterozygous hemoglobin variants (HbS, HgC, etc)do  not significantly interfere with this assay.   However, presence of multiple variants may affect accuracy.     03/14/2023 6.6 (H) 4.0 - 5.6 % Final     Comment:     ADA Screening Guidelines:  5.7-6.4%  Consistent with prediabetes  >or=6.5%  Consistent with diabetes    High levels of fetal hemoglobin interfere with the HbA1C  assay. Heterozygous hemoglobin variants (HbS, HgC, etc)do  not significantly interfere with this assay.   However, presence of multiple variants may affect accuracy.              Past Medical History:   Diagnosis Date    Anticoagulant long-term use     Cancer     Diabetes mellitus     Onset late 50s/early 60s    Hyperlipidemia     Hypertension      Onset late 50s/early 60s    Kidney stones     Sleep apnea     since 2006     Past Surgical History:   Procedure Laterality Date    COLONOSCOPY N/A 10/26/2023    Procedure: COLONOSCOPY;  Surgeon: Noah Duong MD;  Location: [facility] (4TH FLR);  Service: Endoscopy;  Laterality: N/A;  instructions sent to patient portal. [facility]  referral: Dr. Wilson  Pt. on Xarelto--tb-ok to hold see te 8/15/23 dr vu  10/13-precall complete-MS  10/19 pt rescheduled, Xarelto hold, PEG, portal -ml  10/24-precall complete-KPvt    CORONARY ANGIOGRAPHY N/A 9/30/2020    Procedure: ANGIOGRAM, CORONARY ARTERY;  Surgeon: John West MD;  Location: [facility] CATH LAB;  Service: Cardiology;  Laterality: N/A;    CYSTOSCOPY N/A 2/17/2022    Procedure: CYSTOSCOPY;  Surgeon: Lukasz Hughes MD;  Location: [facility] OR 1ST FLR;  Service: Urology;  Laterality: N/A;    CYSTOSCOPY W/ URETERAL STENT PLACEMENT N/A 2/25/2022    Procedure: CYSTOSCOPY, WITH URETERAL STENT INSERTION;  Surgeon: Lukasz Hughes MD;  Location: [facility] OR 1ST FLR;  Service: Urology;  Laterality: N/A;    ENDOSCOPIC ULTRASOUND OF UPPER GASTROINTESTINAL TRACT N/A 12/7/2022    Procedure: ULTRASOUND, UPPER GI TRACT, ENDOSCOPIC;  Surgeon: Darian Main MD;  Location: [facility];  Service: Endoscopy;  Laterality: N/A;  Approval to hold Xarelto rec'd from Dr. Vu (see t/e 12/5/22)-DS    ESOPHAGOGASTRODUODENOSCOPY N/A 11/17/2022    Procedure: EGD (ESOPHAGOGASTRODUODENOSCOPY);  Surgeon: Brody Gonzales MD;  Location: [facility] (2ND FLR);  Service: Endoscopy;  Laterality: N/A;  inst via email-ok to hold Xarelto x 2 days-MS    ESOPHAGOGASTRODUODENOSCOPY N/A 5/31/2023    Procedure: EGD (ESOPHAGOGASTRODUODENOSCOPY) WITH DILATION;  Surgeon: Santana Brown Jr., MD;  Location: [facility] OR 2ND FLR;  Service: General;  Laterality: N/A;    LASER LITHOTRIPSY Left 2/25/2022    Procedure: LITHOTRIPSY, USING LASER;  Surgeon: Lukasz Hughes MD;  Location: [facility] OR [address] [place] [address];  Service:  Urology;  Laterality: Left;    LEFT HEART CATHETERIZATION Left 9/30/2020    Procedure: Left heart cath;  Surgeon: John West MD;  Location: [facility];  Service: Cardiology;  Laterality: Left;    LITHOTRIPSY      PARATHYROIDECTOMY  1/1/2-107    PYELOSCOPY Left 2/25/2022    Procedure: PYELOSCOPY;  Surgeon: Lukasz Hughes MD;  Location: [facility] OR [address] [place] [address];  Service: Urology;  Laterality: Left;    ROBOT-ASSISTED SURGICAL REMOVAL OF ESOPHAGUS USING DA CARLOS XI N/A 3/14/2023    Procedure: XI ROBOTIC ESOPHAGECTOMY;  Surgeon: Santana Brown Jr., MD;  Location: [facility] OR [address] [place] [address];  Service: General;  Laterality: N/A;  Abdomen, Chest and Neck    ROBOTIC JEJUNOSTOMY N/A 3/14/2023    Procedure: ROBOTIC JEJUNOSTOMY TUBE INSERTION;  Surgeon: Santana Brown Jr., MD;  Location: [facility] OR [address] [place] [address];  Service: General;  Laterality: N/A;    TRANSESOPHAGEAL ECHOCARDIOGRAPHY N/A 4/7/2022    Procedure: ECHOCARDIOGRAM, TRANSESOPHAGEAL;  Surgeon: Xander Diagnostic Provider;  Location: [facility] EP LAB;  Service: Cardiology;  Laterality: N/A;    TREATMENT OF CARDIAC ARRHYTHMIA N/A 4/7/2022    Procedure: Cardioversion or Defibrillation;  Surgeon: Iris Fisher NP;  Location: [facility] EP LAB;  Service: Cardiology;  Laterality: N/A;  afib, dccv, artie, anes, EH, 3prep    URETEROSCOPIC REMOVAL OF URETERIC CALCULUS Left 2/25/2022    Procedure: REMOVAL, CALCULUS, URETER, URETEROSCOPIC;  Surgeon: Lukasz Hughes MD;  Location: [address];  Service: Urology;  Laterality: Left;    URETEROSCOPY Left 2/25/2022    Procedure: URETEROSCOPY;  Surgeon: Lukasz Hughes MD;  Location: [facility] OR [address] [place] [address];  Service: Urology;  Laterality: Left;    VOCAL CORD INJECTION Left 3/17/2023    Procedure: INJECTION, VOCAL CORD, LARYNGOSCOPIC;  Surgeon: Gm Altman MD;  Location: [facility] OR [address] [place] [address];  Service: ENT;  Laterality: Left;     Current Outpatient Medications on File Prior to Visit   Medication Sig Dispense Refill    allopurinoL (ZYLOPRIM) 100 MG  tablet TAKE 1 TABLET BY MOUTH EVERY DAY 30 tablet 10    blood sugar diagnostic (ACCU-CHEK ANTONELLA PLUS TEST STRP) Strp Use to test CBG four times daily E11.65 400 strip 3    blood-glucose meter kit Checks blood sugars 1x/daily. 1 each 12    citric acid-potassium citrate (POLYCITRA) 1,100-334 mg/5 mL solution Take 10 mLs (20 mEq total) by mouth 2 (two) times a day. 600 mL 5    hydroCHLOROthiazide (HYDRODIURIL) 25 MG tablet Take 1 tablet (25 mg total) by mouth once daily. 30 tablet 11    lancets (ACCU-CHEK SOFTCLIX LANCETS) Misc USE 1 EVERY DAY OR AS DIRECTED 100 each 3    losartan (COZAAR) 25 MG tablet Take 1 tablet (25 mg total) by mouth once daily. 90 tablet 3    metoprolol succinate (TOPROL-XL) 25 MG 24 hr tablet Take 0.5 tablets (12.5 mg total) by mouth once daily. 15 tablet 11    nitroGLYCERIN (NITROSTAT) 0.4 MG SL tablet Place 1 tablet (0.4 mg total) under the tongue every 5 (five) minutes as needed for Chest pain. If you need a third tablet, call 911 100 tablet 3    omeprazole (PRILOSEC) 40 MG capsule TAKE 1 CAPSULE(40 MG) BY MOUTH EVERY DAY 90 capsule 3    rosuvastatin (CRESTOR) 20 MG tablet Take 1 tablet (20 mg total) by mouth every evening. 90 tablet 3    tamsulosin (FLOMAX) 0.4 mg Cap TAKE 1 CAPSULE(0.4 MG) BY MOUTH EVERY DAY 30 capsule 2    testosterone (ANDROGEL) 20.25 mg/1.25 gram (1.62 %) GlPm Apply 4 pumps to shoulders daily 2 each 5    triamcinolone acetonide 0.1% (KENALOG) 0.1 % cream Apply topically 2 (two) times daily. 45 g 2    warfarin (COUMADIN) 4 MG tablet Take 1 tablet (4 mg total) by mouth Daily. And as directed by [facility] 45 tablet 6     No current facility-administered medications on file prior to visit.     Review of patient's allergies indicates:  No Known Allergies    Review of Systems   Constitutional: Negative for chills, decreased appetite, fever, malaise/fatigue, night sweats, weight gain and weight loss.   Cardiovascular:  Negative for chest pain, claudication, dyspnea on  exertion, leg swelling, palpitations and syncope.   Respiratory:  Negative for cough and shortness of breath.    Endocrine: Negative for cold intolerance and heat intolerance.   Hematologic/Lymphatic: Negative for bleeding problem. Does not bruise/bleed easily.   Skin:  Positive for nail changes. Negative for color change, dry skin, flushing, itching, poor wound healing, rash, skin cancer, suspicious lesions and unusual hair distribution.   Musculoskeletal:  Positive for arthritis and stiffness. Negative for back pain, falls, gout, joint pain, joint swelling, muscle cramps, muscle weakness, myalgias and neck pain.   Gastrointestinal:  Negative for diarrhea, nausea and vomiting.   Neurological:  Positive for numbness and paresthesias. Negative for dizziness, focal weakness, light-headedness, tremors, vertigo and weakness.   Psychiatric/Behavioral:  Negative for altered mental status and depression. The patient does not have insomnia.    Allergic/Immunologic: Negative.            Objective:       Vitals:    02/27/24 1042   BP: 131/67   Pulse: 74   Weight: 86.6 kg (191 lb)   Height: 6' (1.829 m)   PainSc: 0-No pain   PainLoc: Foot   86.6 kg (191 lb)     Physical Exam  Vitals reviewed.   Constitutional:       General: He is not in acute distress.     Appearance: He is well-developed. He is not ill-appearing, toxic-appearing or diaphoretic.      Comments: Proper supportive shoegear   Cardiovascular:      Pulses:           Dorsalis pedis pulses are 2+ on the right side and 2+ on the left side.        Posterior tibial pulses are 1+ on the right side and 1+ on the left side.   Musculoskeletal:      Right lower leg: No tenderness. 1+ Edema present.      Left lower leg: No tenderness. 1+ Edema present.      Right foot: Decreased range of motion. Deformity, bunion and prominent metatarsal heads present. No tenderness or bony tenderness.      Left foot: Decreased range of motion. Deformity, bunion and prominent metatarsal heads  present. No tenderness or bony tenderness.      Comments:     Feet:      Right foot:      Protective Sensation: 10 sites tested.  6 sites sensed.      Skin integrity: Dry skin present. No ulcer, blister, skin breakdown, erythema, warmth, callus or fissure.      Toenail Condition: Right toenails are abnormally thick and long.      Left foot:      Protective Sensation: 10 sites tested.  6 sites sensed.      Skin integrity: Dry skin present. No ulcer, blister, skin breakdown, erythema, warmth, callus or fissure.      Toenail Condition: Left toenails are abnormally thick and long.      Comments:   No open lesions    SWM decreased to digits and forefoot    Nails 1-10 thickened elongated discolored  .       No fissures noted today    + peeling skin     Skin:     General: Skin is warm and dry.      Capillary Refill: Capillary refill takes 2 to 3 seconds.      Coloration: Skin is not pale.      Findings: No erythema or rash.      Nails: There is no clubbing.   Neurological:      Mental Status: He is alert and oriented to person, place, and time.      Sensory: Sensory deficit present.      Gait: Gait abnormal.   Psychiatric:         Attention and Perception: Attention normal.         Mood and Affect: Mood normal.         Speech: Speech normal.         Behavior: Behavior normal.         Thought Content: Thought content normal.         Judgment: Judgment normal.               Assessment:       Encounter Diagnoses   Name Primary?    Diabetic polyneuropathy associated with type 2 diabetes mellitus Yes    Bilateral lower extremity edema     Tinea pedis of both feet     Encounter for diabetic foot exam                Plan:       Lukasz was seen today for diabetes mellitus and routine foot care.    Diagnoses and all orders for this visit:    Diabetic polyneuropathy associated with type 2 diabetes mellitus    Bilateral lower extremity edema    Tinea pedis of both feet    Encounter for diabetic foot exam    Other orders  -      clotrimazole (LOTRIMIN) 1 % cream; Apply topically once daily. for 14 days            I counseled the patient on his conditions, their implications and medical management.     Feet doing well     - Shoe inspection. Diabetic Foot Education. Patient reminded of the importance of good nutrition and blood sugar control to help prevent podiatric complications of diabetes. Patient instructed on proper foot hygeine. We discussed wearing proper shoe gear, daily foot inspections, never walking without protective shoe gear, never putting sharp instruments to feet      With patient's permission, the toenails mentioned above were aggressively reduced and debrided using a nail nipper, removing all offending nail and debris.     Rx antifungal     F/u 3 months sooner if need

## 2024-02-27 NOTE — PROGRESS NOTES
[facility]    02/27/2024 4:11 PM    Lukasz Person (68 y.o.) is followed by the [email or web address].      Assessment/Plan:    Lukasz Person presents today with supratherapeutic INR. Goal INR 2.0-3.0    Assessment of patient findings per MA/LPN and chart review:   The following significant findings were found:  None     Recommendation for patient's warfarin regimen:   Due to supratherapeutic INR, patient was instructed to take a reduced warfarin dose today, but will resume their normal weekly dose.    Recommended repeat INR in 1 week      Melissa Kinsey, PharmD, BCPS  Clinical Pharmacist - [facility]  Preferred Contact: Secure Messaging or In Basket Message

## 2024-02-27 NOTE — PROGRESS NOTES
Subjective:      Patient ID:  Lukasz Person is a 68 y.o. male who presents for   Chief Complaint   Patient presents with    Skin Check     UBSE    Lesion     Bilateral arms. scalp     Would like skin check, new spots on scalp and right arm.  Also right arm itches sometimes no rash.    Lesion - Initial  Affected locations: face, scalp, right arm, chest, back and abdomen  Signs / symptoms: asymptomatic      Review of Systems   Constitutional:  Negative for fever, chills, weight loss, weight gain, fatigue, night sweats and malaise.   Skin:  Positive for daily sunscreen use, activity-related sunscreen use and wears hat.   Hematologic/Lymphatic: Does not bruise/bleed easily.       Objective:   Physical Exam   Constitutional: He appears well-developed and well-nourished. No distress.   Neurological: He is alert and oriented to person, place, and time. He is not disoriented.   Psychiatric: He has a normal mood and affect.   Skin:   Areas Examined (abnormalities noted in diagram):   Head / Face Inspection Performed  Neck Inspection Performed  Chest / Axilla Inspection Performed  Back Inspection Performed  RUE Inspected  LUE Inspection Performed  Nails and Digits Inspection Performed                 Diagram Legend     Erythematous scaling macule/papule c/w actinic keratosis       Vascular papule c/w angioma      Pigmented verrucoid papule/plaque c/w seborrheic keratosis      Yellow umbilicated papule c/w sebaceous hyperplasia      Irregularly shaped tan macule c/w lentigo     1-2 mm smooth white papules consistent with Milia      Movable subcutaneous cyst with punctum c/w epidermal inclusion cyst      Subcutaneous movable cyst c/w pilar cyst      Firm pink to brown papule c/w dermatofibroma      Pedunculated fleshy papule(s) c/w skin tag(s)      Evenly pigmented macule c/w junctional nevus     Mildly variegated pigmented, slightly irregular-bordered macule c/w mildly atypical nevus      Flesh colored to evenly pigmented  "papule c/w intradermal nevus       Pink pearly papule/plaque c/w basal cell carcinoma      Erythematous hyperkeratotic cursted plaque c/w SCC      Surgical scar with no sign of skin cancer recurrence      Open and closed comedones      Inflammatory papules and pustules      Verrucoid papule consistent consistent with wart     Erythematous eczematous patches and plaques     Dystrophic onycholytic nail with subungual debris c/w onychomycosis     Umbilicated papule    Erythematous-base heme-crusted tan verrucoid plaque consistent with inflamed seborrheic keratosis     Erythematous Silvery Scaling Plaque c/w Psoriasis     See annotation      Assessment / Plan:        Multiple actinic keratoses   Cryosurgery Procedure Note    Verbal consent from the patient is obtained and the patient is aware of the precancerous quality and need for treatment of these lesions. Liquid nitrogen cryosurgery is applied to the 3 actinic keratoses, as detailed in the physical exam, to produce a freeze injury.  Call if recurrent  Brochure provided        Skin lesion  -     Ambulatory referral/consult to Dermatology    Seborrheic keratoses  Seborrheic keratosis scattered, told benign no treatment needed.  Brochure provided.      Lentigines  The "ABCD" rules to observe pigmented lesions were reviewed.      Brachioradial pruritus  Dermeleve lotion prn             Follow up in about 6 months (around 8/27/2024).  "

## 2024-02-27 NOTE — PROGRESS NOTES
[facility]  Occupational Therapy Treatment Note - Therapeutic Yoga Progam    Date: 2/27/2024  Name: Lukasz Person  Clinic Number: [id number]    Therapy Diagnosis:       Physician: Betina Valdovinos, *    Physician Orders: Eval and Treat   Medical Diagnosis from Referral: Chronic low back pain [M54.50, G89.29], Poor posture [R29.3], Esophageal adenocarcinoma [C15.9]  Evaluation Date: 8/22/2023  Authorization Period Expiration: 1/31/24  Plan of Care Expiration: 05/22/24  Progress Note Due: 3/6/24  Visit # / Visits authorized: 13/20     Time in:  1)) pm  Time Out:   1:45 pm  Total Billable Time: 45 minutes    SUBJECTIVE     Pt reports: I am feeling good and have done the exercises.  I started PT for the rotator cuff problem  He was compliant with home exercise program given last session.   Response to previous treatment: good  Functional change: easier to get up and down stairs.     Pain:  1/10 in left shoulder due to fall      OBJECTIVE     Objective Measures updated at progress report unless specified.    Patient Specific Functional Scale:           Activity 8/22/23 11/1/23 12/6/23 1/5/24 2/6/24     Poor balance 5    5  6      7     8   2.  Carrying 20 pounds or more 7     6  7       7      8   3.  Challenge with stairs 5     6  6      7     8   4.  Endurance  4      6  7      7      8   5.               6.             SCORE 5.25    5.75    6.5     7.0    8.0      Total Score = Sum of activity scores / number of activities  Minimum Detectable Change (90% CII) for average score = 2 points  Minimum Detectable Change (90% CI) for single activity score = 3 points       Treatment     Lukasz received the treatments listed below:       Date 1/5/24 1/9/24 1/23/24 2/6/23 2/14/23 2/20/24 2/27/24   Therapeutic Yoga Exercises / Neuromuscular Re-education  30 minutes  PN 30 minutes 30 minutes 30 minutes 30 minutes 30 minutes 30 min.   Seated Yoga   chair yoga:  Cat-Cow,forward bend, back bend,  twist, figure 4 chair yoga:  Cat-Cow,forward bend, back bend, twist, figure 4  chair yoga:  Cat-Cow,forward bend, back bend, twist,  chair yoga:  Cat-Cow,forward bend, back bend, twist, figure 4, side body stretch,   On bolster: hamstring stretch with strap,  backbends, bound angle, cross legged,   Quadruped    Cat-cow   Bird dog x 3,cat cow x 6,   Supine Hamstring release with belt,Modified boat pose with block 3 x 5, Twist x 2,( eagle and wide feet),  knees to chest, happy baby flow, knee to chest flow, figure 4,  amstring release with belt,Modified boat pose with block 3 x 5, Twist x 2,( eagle and wide feet),  knees to chest, happy baby flow, knee to chest flow, figure 4,  hamstring release with belt,Modified boat pose with block and head lifted, 3 x 5, Twist x 2,( eagle and wide feet),  knees to chest, happy baby flow, knee to chest flow, figure 4, leg lifts, bridge x 2, hamstring release with belt,Modified boat pose with block and head lifted, 3 x 5, Twist x 2,( eagle and wide feet),  knees to chest, happy baby flow, knee to chest flow, figure 4, leg lifts, bridge x 2 hamstring release with belt,Modified boat pose with block and head lifted, 3 x 5, Twist x 2,( eagle and wide feet),  knees to chest, happy baby flow, knee to chest flow, figure 4, leg lifts, bridge x 2, bound angle x 5 breaths,           hamstring release with sheet, Modified boat pose with block and head lifted, 2 x 5, Twist x 2,( eagle and wide feet),  knees to chest, happy baby flow, knee to chest flow,  On bolster:  Modified boat pose with block and head lifted, 2 x 5, Twist x 2,( eagle and wide feet),  knees to chest, happy baby flow, knee to chest flow,    Prone   sphinx sphinx Sphinx x 2 Sphinx/ sphinx plank x 3 Sphinx/ sphinx plank x 3   standing Warrior 2, chair pose/volcano x3, wide straddle with hands on chair, standing back ext with hands on sacrum and heels pulling together,  chair pose/volcano x3, wide straddle with hands on chair,  standing back ext with hands on sacrum and heels pulling together, transfer floor to stand with supervision and chair, hair pose/volcano x3, wide straddle with hands on chair, standing back ext with hands on sacrum and heels pulling together, transfer floor to stand without chair, chair pose/volcano x2, wide straddle with hands on chair, standing back ext with hands on sacrum and heels pulling together,  chair pose/volcano x2, wide straddle with hands on chair, standing back ext with hands on sacrum and heels pulling together, warrior 2, side angle with chair,  chair pose/volcano x2, wide straddle with hands on chair, standing back ext with hands on sacrum and heels pulling together, warrior 2, chair pose/volcano x2, wide straddle with hands on chair, standing back ext with hands on sacrum and heels pulling together, warrior 2,                       Self-Care/Home Management  / Therapeutic Activities  15 minutes 15 minutes 15 minutes  15 minutes 15 minutes 15 min.             Relaxation techniques DB,body scan,  DB,body scan, metta  DB,body scan, metta   DB,body scan, metta  DB,body scan, metta  DB,body scan, metta    Restorative  Supine with bolster unde knees Bridge and bound angle with bolsters Bridge and bound angle with bolsters  Supine in crossed ankle with legs on 2 stacked bolsters Supine on treatment table with legs up wall using 2 bolsters, Supine on floor in bolster supported bridge and bound angle   Activity Pacing                              Stress Management/Education  - physiology of yoga/meditation and immune health - physiology of yoga/meditation and immune health physiology of yoga/meditation and immune health  - physiology of yoga/meditation and immune health - physiology of yoga/meditation and immune health - physiology of yoga/meditation and immune health                 Patient Education and Home Exercises      Education provided:   - - physiology of yoga/meditation and immune health  -  Progress towards goals     Written Home Exercises Provided: yes.  Exercises were reviewed and Lukasz was able to demonstrate them prior to the end of the session.  Lukasz demonstrated good  understanding of the HEP provided. See EMR under Patient Instructions for exercises provided during therapy sessions.       Assessment      In today's session, Lukasz practiced his HEP focusing  on strengthening, stretching and relaxation techniques. We focused on sitting and supineposes today to build strength and balance as well as abdominal engagement vilma all  poses.  He  was able to sit up on a low bolster with back support.  He required moderate verbal and neuromuscular cues.     Lukasz is progressing well towards his goals and patient prognosis is Good.    Progress Update: Lukasz is making good progress towards his goals.  His PSFS score increased from   7.0 to 8.0 indicating increased functional ability.  He reports his balance and endurance are improving with yoga exercise. He is able to transfer from stand to floor without using a chair. He also reports he is using prayer and breathing techniques to help with stress/immune health.   Patient will continue to benefit from skilled outpatient occupational therapy to address the deficits listed in the problem list on initial evaluation provide pt/family education and to maximize pt's level of independence in the home and community environment. Pt's spiritual, cultural and educational needs considered and pt agreeable to plan of care and goals.    Anticipated barriers to occupational therapy: none    Goals:  Short Term Goals: 6 weeks      Goal # Goal Status   1 Patient will demonstrate independence with diaphragmatic breathing in sit and supine. met   2 Pt. will identify resource for audio body scan to assist with stress management/immune health met   3 Patient will identify 2 new stress coping skills for stress management/immune health. met   4 Patient will identify  activity pacing problems.  Patient will then implement new plan for daily activity to increase endurance for ADL's. Progressing      Long Term Goals: 12 weeks      Goal # Goal Status   1 Patient will demonstrate independence with yoga Home Exercise Program to increase strength and endurance for ADL's. Progressing   2 Patient will demonstrate independence with relaxation techniques to manage stress for immune health. Progressing   3 Patient will verbalize good understanding of stress/immune health relationship.  Progressing   4            PLAN     Plan of care Certification: 2/27/2024 to 5/22/24.    Outpatient Occupational Therapy 1 times weekly for 14 weeks to include the following interventions: Neuromuscular Re-ed, Patient Education, Self Care, Therapeutic Activities, and Therapeutic Exercise.     Joanna Kimball, OT

## 2024-02-29 ENCOUNTER — CLINICAL SUPPORT (OUTPATIENT)
Dept: REHABILITATION | Facility: HOSPITAL | Age: 69
End: 2024-02-29
Payer: MEDICARE

## 2024-02-29 DIAGNOSIS — M25.512 LEFT SHOULDER PAIN, UNSPECIFIED CHRONICITY: Primary | ICD-10-CM

## 2024-02-29 PROCEDURE — 97110 THERAPEUTIC EXERCISES: CPT

## 2024-02-29 NOTE — PROGRESS NOTES
[facility] OUTPATIENT THERAPY AND WELLNESS  Occupational Therapy Treatment Note     Date: 2/29/2024  Name: Lukasz Person  Clinic Number: [id number]      Therapy Diagnosis: left shoulder pain       Physician: Kirk Wilson MD    Physician Orders:  eval and treat   Medical Diagnosis: S46.012A (ICD-10-CM) - Traumatic tear of left rotator cuff, unspecified tear extent, initial encounter   Surgical Procedure/ Date :reports no shoulder sx   Evaluation Date: 1/25/2024  Insurance:Medicare   Insurance Authorization period Expiration: 1/21/25   Plan of Care Certification Period: 4/30/24      Visit # / Visits Authortized: 4 / 20   Time In:10;00 am   Time Out: 11:00  am   Total Billable Time: 60 minutes       Precautions: Standard, needs to have head elevated in supine due to esophageal cancer and reconstruction     Subjective     Patient reports: no pain  He was compliant with home exercise program given last session.   Response to previous treatment:first tx  Functional change: none to be noted to this date     Pain: 0/10  Location: left shoulder      Objective     Objective Measures updated at progress report unless specified.    Active Range of Motion: Measured in degrees     Shoulder Right Left  Left 2/29/24   Flexion 155 130 160    Abduction 130 130 150   ER at 0 80 60 60   ER at 90 60  60  60   IR 60 60 T 12          Shoulder Right   Shoulder flexion: 4/5   Shoulder Abduction: 4/5   Shoulder ER 4/5   Shoulder IR 4/5   Lower Trap 5/5   Middle Trap 5/5   Rhomboids 5/5         Scapular Control/Dyskinesis:     Normal / Subtle / Obvious  Comments    Left  Protracted      Right  Protracted       He reports that he does sleep         Palpation Shoulder:     Lateral shoulder pain                CMS Impairment/Limitation/Restriction for FOTO initial  Survey     Therapist reviewed FOTO scores for Lukasz Person on 1/25/2024.   FOTO documents entered into EPIC - see Media section.      17 intake score           Treatment     Lukasz  received the treatments listed below:     Hot pack to left shoulder X 10 minutes      Manual  therapy X 10 minutes Shoulder approximation with isometrics       therapeutic exercises to develop strength for 38  minutes including:  - un weighted dowel flexion, abduction, scaption, ER x 10 reps (3 sets)   -   Red t band alcon  ( rows) , Er walk outs , IR walk outs and shoulder adduction  20 reps ( added to HEP )       Wall slides 20 reps ( added to HEP )   Supine chest press ( serratus push )  1-3# weight 20 reps ( added to HEP)           Patient Education and Home Exercises     Education provided:   - added table slides with HEP   - Progress towards goals     Written Home Exercises Provided: Patient instructed to cont prior HEP.  Exercises were reviewed and Lukasz was able to demonstrate them prior to the end of the session.  Lukasz demonstrated good  understanding of the home exercise program provided. See electronic medical record under Patient Instructions for exercises provided during therapy sessions.       Assessment     Good debra today. Good ROM no pain , increased ROM noted no increased pain     Lukasz is progressing well towards his goals and there are no updates to goals at this time. Pt prognosis is Good.     Patient will continue to benefit from skilled outpatient occupational therapy to address the deficits listed in the problem list on initial evaluation provide patient/family education and to maximize patient's level of independence in the home and community environment.     Patient's spiritual, cultural and educational needs considered and patient agreeable to plan of care and goals.      Anticipated barriers to occupational therapy:     Goals:  Short Term (4  weeks on 2/29/24 ):  1)   Patient to be IND with HEP and modalities for pain management  2)   Increase ROM 15 degrees in left shoulder motion to increase functional UE use for  shoulder flexion, scaption and ER   3)  Improve muscle strength  ing 4/+5 by shoulder motions  in order to carry items with left shoulder .      Long Term (by discharge):  1)   Pt will report 0 out of 10 pain with left shoulder . Reaching forward or overhead into closets .  2)   Patient to increase score by  10 on FOTO to demonstrate improved perception of functional left use.  3)   Pt will return to prior level of function for ADLs and household management.     Plan     Updates/Grading for next session:     Sudha Murillo, OT   2/29/2024

## 2024-03-01 ENCOUNTER — PATIENT MESSAGE (OUTPATIENT)
Dept: CARDIOLOGY | Facility: CLINIC | Age: 69
End: 2024-03-01
Payer: MEDICARE

## 2024-03-01 ENCOUNTER — DOCUMENTATION ONLY (OUTPATIENT)
Dept: CARDIOLOGY | Facility: CLINIC | Age: 69
End: 2024-03-01
Payer: MEDICARE

## 2024-03-01 DIAGNOSIS — I42.8 NON-ISCHEMIC CARDIOMYOPATHY: ICD-10-CM

## 2024-03-02 ENCOUNTER — PATIENT MESSAGE (OUTPATIENT)
Dept: ADMINISTRATIVE | Facility: OTHER | Age: 69
End: 2024-03-02
Payer: MEDICARE

## 2024-03-02 RX ORDER — LOSARTAN POTASSIUM 25 MG/1
25 TABLET ORAL 2 TIMES DAILY
Qty: 180 TABLET | Refills: 3 | Status: SHIPPED | OUTPATIENT
Start: 2024-03-02 | End: 2025-03-02

## 2024-03-04 ENCOUNTER — OFFICE VISIT (OUTPATIENT)
Dept: HEMATOLOGY/ONCOLOGY | Facility: CLINIC | Age: 69
End: 2024-03-04
Payer: MEDICARE

## 2024-03-04 ENCOUNTER — RESEARCH ENCOUNTER (OUTPATIENT)
Dept: RESEARCH | Facility: HOSPITAL | Age: 69
End: 2024-03-04
Payer: MEDICARE

## 2024-03-04 ENCOUNTER — ANTI-COAG VISIT (OUTPATIENT)
Dept: CARDIOLOGY | Facility: CLINIC | Age: 69
End: 2024-03-04
Payer: MEDICARE

## 2024-03-04 ENCOUNTER — PATIENT MESSAGE (OUTPATIENT)
Dept: CARDIOLOGY | Facility: CLINIC | Age: 69
End: 2024-03-04
Payer: MEDICARE

## 2024-03-04 ENCOUNTER — INFUSION (OUTPATIENT)
Dept: INFUSION THERAPY | Facility: HOSPITAL | Age: 69
End: 2024-03-04
Payer: MEDICARE

## 2024-03-04 VITALS
SYSTOLIC BLOOD PRESSURE: 123 MMHG | WEIGHT: 198.44 LBS | DIASTOLIC BLOOD PRESSURE: 59 MMHG | HEART RATE: 50 BPM | TEMPERATURE: 98 F | OXYGEN SATURATION: 97 % | BODY MASS INDEX: 26.88 KG/M2 | HEIGHT: 72 IN

## 2024-03-04 VITALS
SYSTOLIC BLOOD PRESSURE: 133 MMHG | HEART RATE: 50 BPM | OXYGEN SATURATION: 100 % | RESPIRATION RATE: 16 BRPM | DIASTOLIC BLOOD PRESSURE: 63 MMHG | TEMPERATURE: 98 F

## 2024-03-04 DIAGNOSIS — C15.9 ESOPHAGEAL ADENOCARCINOMA: Primary | ICD-10-CM

## 2024-03-04 DIAGNOSIS — I48.0 PAROXYSMAL ATRIAL FIBRILLATION: Primary | Chronic | ICD-10-CM

## 2024-03-04 PROCEDURE — 99214 OFFICE O/P EST MOD 30 MIN: CPT | Mod: Q1,PBBFAC,25 | Performed by: INTERNAL MEDICINE

## 2024-03-04 PROCEDURE — 63600175 PHARM REV CODE 636 W HCPCS: Mod: Q1 | Performed by: INTERNAL MEDICINE

## 2024-03-04 PROCEDURE — A4216 STERILE WATER/SALINE, 10 ML: HCPCS | Mod: Q1 | Performed by: INTERNAL MEDICINE

## 2024-03-04 PROCEDURE — 96413 CHEMO IV INFUSION 1 HR: CPT

## 2024-03-04 PROCEDURE — 25000003 PHARM REV CODE 250: Performed by: INTERNAL MEDICINE

## 2024-03-04 PROCEDURE — 99215 OFFICE O/P EST HI 40 MIN: CPT | Mod: Q1,S$PBB,, | Performed by: INTERNAL MEDICINE

## 2024-03-04 PROCEDURE — 99999 PR PBB SHADOW E&M-EST. PATIENT-LVL IV: CPT | Mod: PBBFAC,,, | Performed by: INTERNAL MEDICINE

## 2024-03-04 PROCEDURE — 93793 ANTICOAG MGMT PT WARFARIN: CPT | Mod: ,,,

## 2024-03-04 RX ORDER — EPINEPHRINE 0.3 MG/.3ML
0.3 INJECTION SUBCUTANEOUS ONCE AS NEEDED
Status: CANCELLED | OUTPATIENT
Start: 2024-03-04

## 2024-03-04 RX ORDER — DIPHENHYDRAMINE HYDROCHLORIDE 50 MG/ML
50 INJECTION INTRAMUSCULAR; INTRAVENOUS ONCE AS NEEDED
Status: CANCELLED | OUTPATIENT
Start: 2024-03-04

## 2024-03-04 RX ORDER — HEPARIN 100 UNIT/ML
500 SYRINGE INTRAVENOUS
Status: CANCELLED | OUTPATIENT
Start: 2024-03-04

## 2024-03-04 RX ORDER — CLOTRIMAZOLE 1 %
CREAM (GRAM) TOPICAL DAILY
Qty: 45 G | Refills: 0 | Status: SHIPPED | OUTPATIENT
Start: 2024-03-04 | End: 2024-04-29

## 2024-03-04 RX ORDER — DIPHENHYDRAMINE HYDROCHLORIDE 50 MG/ML
50 INJECTION INTRAMUSCULAR; INTRAVENOUS ONCE AS NEEDED
Status: DISCONTINUED | OUTPATIENT
Start: 2024-03-04 | End: 2024-03-04 | Stop reason: HOSPADM

## 2024-03-04 RX ORDER — SODIUM CHLORIDE 0.9 % (FLUSH) 0.9 %
10 SYRINGE (ML) INJECTION
Status: CANCELLED | OUTPATIENT
Start: 2024-03-04

## 2024-03-04 RX ORDER — EPINEPHRINE 0.3 MG/.3ML
0.3 INJECTION SUBCUTANEOUS ONCE AS NEEDED
Status: DISCONTINUED | OUTPATIENT
Start: 2024-03-04 | End: 2024-03-04 | Stop reason: HOSPADM

## 2024-03-04 RX ORDER — HEPARIN 100 UNIT/ML
500 SYRINGE INTRAVENOUS
Status: DISCONTINUED | OUTPATIENT
Start: 2024-03-04 | End: 2024-03-04 | Stop reason: HOSPADM

## 2024-03-04 RX ORDER — SODIUM CHLORIDE 0.9 % (FLUSH) 0.9 %
10 SYRINGE (ML) INJECTION
Status: DISCONTINUED | OUTPATIENT
Start: 2024-03-04 | End: 2024-03-04 | Stop reason: HOSPADM

## 2024-03-04 RX ADMIN — HEPARIN 500 UNITS: 100 SYRINGE at 03:03

## 2024-03-04 RX ADMIN — Medication 10 ML: at 03:03

## 2024-03-04 RX ADMIN — SODIUM CHLORIDE: 9 INJECTION, SOLUTION INTRAVENOUS at 03:03

## 2024-03-04 NOTE — PROGRESS NOTES
: URIEL Manriquez MD  Treating Investigators: NIRAV Medrano MD  Surgical Oncologist: SARAH Brown M.D. / Gerri Zhang NP  Radiation Oncologist: Javier Moulton M.D, PhD     Protocol: ECOG-ACRIN [id number]  IRB#: [id number]  Patient ID: [id number]  Treatment: Arm B - Carbo+Taxol+Nivolumab  Treatment: Arm C - Nivolumab Monotherapy         A Phase II/III Study of Dilcia-operative Nivolumab and Ipilimumab in Patients with Locoregional Esophageal and Gastroesophageal Junction Adenocarcinoma     Step 2  C12D1: 04Mar2024  Patient presents to clinic today unaccompanied for his C12D1 visit. Patient queried & verbalized his willingness to continue his participation on the above-mentioned trial. Patient queried and reports that his anorexia stopped around of the first of the month in February & denies any new or changed ConMeds.    Patient completed safety labs, VS, PE & ECOG (ECOG = 0, per Dr. Medrano) today per protocol. Dr. Medrano assessed all patient's labs, VS & PE findings as either WNL or NCS, deeming patient eligible to continue treatment with Nivolumab monotherapy today.    Weight:  Step 1 Week 1 was 117.2 kg  Step 2 C1D1 was 99.2 kg   ( +/- 10% = 89.3 - 109.2 kg).   Today's weight is 90.0 kg   >20% weight loss since Step 1 Week 1 --> >10% - < 20%change from Step 2 Cycle 1.      Per protocol, Nivolumab is administered at a 480 mg flat dose & cannot be modified. [facility] & Dr. Medrano performed time-out in exam room.     Infusion RN Carmen Daniels was instructed to administer the treatment per the treatment plan with full sets of vital signs pre- and post-dose. RN expressed their understanding of all instructions. Patient completed treatment today without event & was DC'd from clinic ambulatory and in stable condition. Please see Infusion RN note for further information.  Infusion RN did not collect any vitals signs at this visit. RN was recounselled on above instructions & verbalized her understanding.      Patient was encouraged to contact [facility] or Dr. Medrano's team with any questions or concerns & was reminded of clinic's 24/7 emergency contact number. Patient verbalized understanding & denies any additional questions at this time.        Step 2 -- C13D1 - [id number]:  Labs @ 1200 -- [facility] 3rd floor  Marcela @ 1300 -- 2nd floor  Infusion @ 1400 (Nivolumab only)        Solicited AEs -- Assessed 08Dec2023:  ** Anemia - Grade 1 -- 30Jan2024 - ongoing -- (NCS per MD)  Platelet count decreased - Grade 0   ** White blood cell count decreased - Grade 1 - 08Jan2024 - 04Mar2024 (NCS per MD) -- DC'd 04Mar2024  Neutrophil count decreased - Grade 0  ** Lymphocyte count decreased - Grade 2 -- 14Nov2023 - ongoing (NCS per MD)  Peripheral motor neuropathy - Grade 0   Peripheral sensory neuropathy - Grade 0   Creatinine increased - Grade 0  Hypothyroidism - Grade 0   (TSH WNL on 16Oct2023)  Diarrhea (patient baseline BM = 2 stools a day) - Grade 0 -- reports loose stools vs. diarrhea   **Fatigue - Grade 1 -- 16Oct2023 - ongoing (NCS per MD)  **Nausea - Grade 1 - 21Apr2023 - ongoing  (NCS per MD)  **Cough - Grade 1  -- Medical History  Pneumonitis - Grade 0  Hyperglycemia - Grade 0  Adrenal Insufficiency - Grade 0 -- 16Oct2023 ACTH & cortisol WNL  **Rash-maculo-papular - Grade 1 - [id number] - ongoing ( used Sarna [camphor 0.5% - menthol 0.5&] PRN -- Prescribed triamcinolone 24Jul2023 ) -- (NCS per MD)  **Pruritis - Grade 1 - [id number] - ongoing -- (NCS per MD)  Erythema multiforme - Grade 0  Vomiting - Grade 0    Lipase increased -- Not required per protocol and therefore not assessed this visit.      Ongoing Non-Solicited AEs:  Hoarseness - Grade 2 - 14Mar2023 - ongoing ( no treatment )  Dysphagia - Grade 1 - [id number] - ongoing ( no treatment )  Weight loss - Grade 2 - [id number] - ongoing ( no treatment )      New or Changed Non-Solicited AEs Since Last Visit:  Anorexia - Grade 1 - 09Jan2023 - [id number] (no treatment)          Complete Baseline Medical History, Adverse Events, and Concomitant Medications are located in patient's shadow chart

## 2024-03-04 NOTE — PROGRESS NOTES
MEDICAL ONCOLOGY - ESTABLISHED PATIENT VISIT    Reason for visit: esophageal cancer    Best Contact Phone Number(s): [phone] (home)      Cancer/Stage/TNM:    Cancer Staging   Esophageal adenocarcinoma  Staging form: Esophagus - Adenocarcinoma, AJCC 8th Edition  - Pathologic stage from 3/28/2023: Stage II (ypT3, pN0, cM0, G2) - Signed by Santana Brown Jr., MD on 3/28/2023       Oncology History   Esophageal adenocarcinoma   12/8/2022 Initial Diagnosis    Esophageal adenocarcinoma     12/21/2022 - 12/21/2022 Chemotherapy    Treatment Summary   Plan Name: OP ESOPHAGEAL PACLITAXEL CARBOPLATIN WEEKLY  Treatment Goal: Curative  Status: Inactive  Start Date:   End Date:   Provider: Miki Medrano MD  Chemotherapy: CARBOplatin (PARAPLATIN) in sodium chloride 0.9% 250 mL chemo infusion, , Intravenous, Clinic/HOD 1 time, 0 of 1 cycle  PACLitaxeL (TAXOL) 50 mg/m2 = 120 mg in sodium chloride 0.9% 250 mL chemo infusion, 50 mg/m2, Intravenous, Clinic/HOD 1 time, 0 of 1 cycle     12/29/2022 - 1/20/2023 Chemotherapy    Treatment Summary   Plan Name: [facility] [id number] ARM B CARBOPLATIN PACLITAXEL NIVOLUMAB  Treatment Goal: Curative  Status: Inactive  Start Date: 12/29/2022  End Date: 1/20/2023  Provider: Miki Medrano MD  Chemotherapy: CARBOplatin (PARAPLATIN) 215 mg in sodium chloride 0.9% 306.5 mL chemo infusion, 215 mg, Intravenous, Clinic/HOD 1 time, 4 of 5 cycles  Administration: 215 mg (12/29/2022), 230 mg (1/5/2023), 265 mg (1/13/2023), 265 mg (1/20/2023)  PACLitaxeL (TAXOL) 50 mg/m2 = 120 mg in sodium chloride 0.9% 250 mL chemo infusion, 50 mg/m2 = 120 mg, Intravenous, Clinic/HOD 1 time, 4 of 5 cycles  Dose modification: 50 mg/m2 (original dose 50 mg/m2, Cycle 4)  Administration: 120 mg (12/29/2022), 120 mg (1/5/2023), 120 mg (1/13/2023), 120 mg (1/20/2023)     12/29/2022 - 2/1/2023 Radiation Therapy    Treating physician: Dr. Javier Moulton    Course: C1 CHEST 2022    Treatment Site Ref. ID Energy  Dose/Fx (Gy) #Fx Dose Correction (Gy) Total Dose (Gy) Start Date End Date Elapsed Days   IM Esophagus [id number] 6X 1.8 23 / 23 0 41.4 12/29/2022 2/1/2023 34        3/17/2023 Surgery    Procedure: Procedure(s) (LRB):  XI ROBOTIC ESOPHAGECTOMY (N/A)  ROBOTIC JEJUNOSTOMY TUBE INSERTION (N/A)      Surgeon(s) and Role:     * Santana Brown Jr., MD - Primary     * Darian Murphy MD - Assisting     Assistance: Due to having no qualified resident during critical portions of the case, Dr. Darian Murphy acted as an assistant.     Pre-Operative Diagnosis: Esophageal adenocarcinoma, distal 1/3     Post-Operative Diagnosis: Same     Pre-Operative Variables:  Stage: T3 N0  Adjacent organ involvement: None  Chemotherapy within 90 Days: Yes  Radiation Therapy within 90 Days: Yes     Comorbidities:  Morbid obesity  Type 2 diabetes mellitus  Obstructive sleep apnea  Gout  Hyperparathyroidism  Hypertension  Severe obesity  Coronary artery disease  Heart failure with reduced ejection fraction    Procedure:  Robotic-assisted Vj three field esophagectomy  Regional mediastinal lymph node dissection  Botox injection of the pylorus  Jejunostomy tube placement     Operative Findings:                No evidence of distant intraperitoneal metastases in the chest or abdomen  Esophageal tumor located in the distal third of the esophagus, mild radiation changes appreciated.  Resection status:  R0 pending final pathology.  No gross disease remaining post dissection.  Frozen sections on proximal and distal margins negative for malignancy  100u Botox injection into the pylorus  Stapled esophagogastrostomy performed at the neck incision after confirmation of negative margins.  Jejunostomy tube placed      3/28/2023 Cancer Staged    Staging form: Esophagus - Adenocarcinoma, AJCC 8th Edition  - Pathologic stage from 3/28/2023: Stage II (ypT3, pN0, cM0, G2)     5/1/2023 - 5/1/2023 Chemotherapy    Treatment Summary   Plan Name: [facility] [id number] ARM C  ADJUVANT NIVOLUMAB  Treatment Goal: Curative  Status: Inactive  Start Date: 5/1/2023  End Date: 5/1/2023  Provider: Miki Medrano MD  Chemotherapy: [No matching medication found in this treatment plan]     5/29/2023 -  Chemotherapy    Treatment Summary   Plan Name: [facility] [id number] ARM C ADJUVANT NIVOLUMAB STEP 2  Treatment Goal: Curative  Status: Active  Start Date: 5/29/2023  End Date: 4/29/2024 (Planned)  Provider: Miki Medrano MD  Chemotherapy: [No matching medication found in this treatment plan]          Interim History:   Mr. Person returns to clinic today prior to cycle 12 of adjuvant nivolumab. He underwent robotic esophagectomy on 3/14/23 with Dr. Brown and J tube insertion. He reports doing well overall with some continued intermittent dysphagia with rare episodes of vomiting post-prandially and some mucous production. He is encouraged by some weight gain since the start of this year as well. He is eager to complete adjuvant immunotherapy next month.         Presents to clinic alone. ECOG 0.     Review of Systems   Constitutional:  Negative for chills, diaphoresis, fever, malaise/fatigue and weight loss.   HENT:  Negative for sore throat.    Eyes:  Negative for blurred vision and double vision.   Respiratory:  Negative for cough and shortness of breath.    Cardiovascular:  Negative for chest pain, palpitations and leg swelling.   Gastrointestinal:  Negative for abdominal pain, blood in stool, constipation, diarrhea, heartburn, nausea and vomiting.        Dysphagia   Genitourinary:  Negative for dysuria, frequency, hematuria and urgency.   Musculoskeletal:  Negative for back pain, falls, myalgias and neck pain.   Skin:  Positive for itching and rash.   Neurological:  Negative for dizziness, tingling, weakness and headaches.   Psychiatric/Behavioral:  The patient is not nervous/anxious.      Past Medical History:   Past Medical History:   Diagnosis Date    Anticoagulant long-term use     Cancer      Diabetes mellitus     Onset late 50s/early 60s    Hyperlipidemia     Hypertension     Onset late 50s/early 60s    Kidney stones     Sleep apnea     since 2006      Past Surgical History:   Past Surgical History:   Procedure Laterality Date    COLONOSCOPY N/A 10/26/2023    Procedure: COLONOSCOPY;  Surgeon: Noah Duong MD;  Location: [facility] (4TH FLR);  Service: Endoscopy;  Laterality: N/A;  instructions sent to patient portal. [facility]  referral: Dr. Wilson  Pt. on Xarelto--tb-ok to hold see te 8/15/23 dr vu  10/13-precall complete-MS  10/19 pt rescheduled, Xarelto hold, PEG, portal -ml  10/24-precall complete-KPvt    CORONARY ANGIOGRAPHY N/A 9/30/2020    Procedure: ANGIOGRAM, CORONARY ARTERY;  Surgeon: John West MD;  Location: [facility] CATH LAB;  Service: Cardiology;  Laterality: N/A;    CYSTOSCOPY N/A 2/17/2022    Procedure: CYSTOSCOPY;  Surgeon: Lukasz Hughes MD;  Location: [facility] OR 1ST FLR;  Service: Urology;  Laterality: N/A;    CYSTOSCOPY W/ URETERAL STENT PLACEMENT N/A 2/25/2022    Procedure: CYSTOSCOPY, WITH URETERAL STENT INSERTION;  Surgeon: Lukasz Hughes MD;  Location: [facility] OR 1ST FLR;  Service: Urology;  Laterality: N/A;    ENDOSCOPIC ULTRASOUND OF UPPER GASTROINTESTINAL TRACT N/A 12/7/2022    Procedure: ULTRASOUND, UPPER GI TRACT, ENDOSCOPIC;  Surgeon: Darian Main MD;  Location: [facility];  Service: Endoscopy;  Laterality: N/A;  Approval to hold Xarelto rec'd from Dr. Vu (see t/e 12/5/22)-DS    ESOPHAGOGASTRODUODENOSCOPY N/A 11/17/2022    Procedure: EGD (ESOPHAGOGASTRODUODENOSCOPY);  Surgeon: Brody Gonzales MD;  Location: [facility] (2ND FLR);  Service: Endoscopy;  Laterality: N/A;  inst via email-ok to hold Xarelto x 2 days-MS    ESOPHAGOGASTRODUODENOSCOPY N/A 5/31/2023    Procedure: EGD (ESOPHAGOGASTRODUODENOSCOPY) WITH DILATION;  Surgeon: Santana Brown Jr., MD;  Location: [facility] OR [address] [place] [address];  Service: General;  Laterality: N/A;    LASER LITHOTRIPSY  Left 2/25/2022    Procedure: LITHOTRIPSY, USING LASER;  Surgeon: Lukasz Hughes MD;  Location: [facility] OR [facility] FLR;  Service: Urology;  Laterality: Left;    LEFT HEART CATHETERIZATION Left 9/30/2020    Procedure: Left heart cath;  Surgeon: John West MD;  Location: [facility] CATH LAB;  Service: Cardiology;  Laterality: Left;    LITHOTRIPSY      PARATHYROIDECTOMY  1/1/2-107    PYELOSCOPY Left 2/25/2022    Procedure: PYELOSCOPY;  Surgeon: Lukasz Hughes MD;  [facility];  Service: Urology;  Laterality: Left;    ROBOT-ASSISTED SURGICAL REMOVAL OF ESOPHAGUS USING DA CARLOS XI N/A 3/14/2023    Procedure: XI ROBOTIC ESOPHAGECTOMY;  Surgeon: Santana Brown Jr., MD;  Location: [facility] OR [address] [place] [address];  Service: General;  Laterality: N/A;  Abdomen, Chest and Neck    ROBOTIC JEJUNOSTOMY N/A 3/14/2023    Procedure: ROBOTIC JEJUNOSTOMY TUBE INSERTION;  Surgeon: Santana Brown Jr., MD;  [facility];  Service: General;  Laterality: N/A;    TRANSESOPHAGEAL ECHOCARDIOGRAPHY N/A 4/7/2022    Procedure: ECHOCARDIOGRAM, TRANSESOPHAGEAL;  Surgeon: [facility] Diagnostic Provider;  Location: [facility] EP LAB;  Service: Cardiology;  Laterality: N/A;    TREATMENT OF CARDIAC ARRHYTHMIA N/A 4/7/2022    Procedure: Cardioversion or Defibrillation;  Surgeon: Iris Fisher NP;  Location: [facility] EP LAB;  Service: Cardiology;  Laterality: N/A;  afib, dccv, artie, anes, EH, 3prep    URETEROSCOPIC REMOVAL OF URETERIC CALCULUS Left 2/25/2022    Procedure: REMOVAL, CALCULUS, URETER, URETEROSCOPIC;  Surgeon: Lukasz Hughes MD;  [facility];  Service: Urology;  Laterality: Left;    URETEROSCOPY Left 2/25/2022    Procedure: URETEROSCOPY;  Surgeon: Lukasz Hughes MD;  [facility];  Service: Urology;  Laterality: Left;    VOCAL CORD INJECTION Left 3/17/2023    Procedure: INJECTION, VOCAL CORD, LARYNGOSCOPIC;  Surgeon: Gm Altman MD;  [facility];  Service: ENT;   Laterality: Left;      Family History:   Family History   Problem Relation Age of Onset    Arthritis Mother     Heart disease Father 70        CABG    Arthritis Father     Diabetes Father     Kidney disease Father         had one kidney removed in early thirties    Arthritis Sister     Arthritis Sister     Hypertension Sister     Colon cancer Brother 32    Arthritis Brother     Alcohol abuse Paternal Aunt     Cancer Maternal Grandfather         throat cancer    Diabetes Paternal Grandmother     Colon polyps Paternal Grandfather     Colon cancer Paternal Grandfather     Cancer Paternal Grandfather         colon cancer at age 62      Social History:   Social History     Tobacco Use    Smoking status: Former     Current packs/day: 0.00     Average packs/day: 1 pack/day for 22.7 years (22.7 ttl pk-yrs)     Types: Cigarettes, Cigars     Start date: 1972     Quit date:      Years since quittin.8     Passive exposure: Never    Smokeless tobacco: Never    Tobacco comments:     smoking was off and on.  cumulative 7 years.  never more than three years in one stretch or more lj   Substance Use Topics    Alcohol use: Yes     Alcohol/week: 4.0 standard drinks of alcohol     Types: 4 Shots of liquor per week      I have reviewed and updated the patient's past medical, surgical, family and social histories.    Allergies:   Review of patient's allergies indicates:  No Known Allergies     Medications:   Current Outpatient Medications   Medication Sig Dispense Refill    allopurinoL (ZYLOPRIM) 100 MG tablet TAKE 1 TABLET BY MOUTH EVERY DAY 30 tablet 10    blood sugar diagnostic (ACCU-CHEK ANTONELLA PLUS TEST STRP) Strp Use to test CBG four times daily E11.65 400 strip 3    blood-glucose meter kit Checks blood sugars 1x/daily. 1 each 12    citric acid-potassium citrate (POLYCITRA) 1,100-334 mg/5 mL solution Take 10 mLs (20 mEq total) by mouth 2 (two) times a day. 600 mL 5    hydroCHLOROthiazide  (HYDRODIURIL) 25 MG tablet Take 1 tablet (25 mg total) by mouth once daily. 30 tablet 11    lancets (ACCU-CHEK SOFTCLIX LANCETS) Misc USE 1 EVERY DAY OR AS DIRECTED 100 each 3    losartan (COZAAR) 25 MG tablet Take 1 tablet (25 mg total) by mouth 2 (two) times a day. 180 tablet 3    metoprolol succinate (TOPROL-XL) 25 MG 24 hr tablet Take 0.5 tablets (12.5 mg total) by mouth once daily. 15 tablet 11    nitroGLYCERIN (NITROSTAT) 0.4 MG SL tablet Place 1 tablet (0.4 mg total) under the tongue every 5 (five) minutes as needed for Chest pain. If you need a third tablet, call 911 100 tablet 3    omeprazole (PRILOSEC) 40 MG capsule TAKE 1 CAPSULE(40 MG) BY MOUTH EVERY DAY 90 capsule 3    rosuvastatin (CRESTOR) 20 MG tablet Take 1 tablet (20 mg total) by mouth every evening. 90 tablet 3    tamsulosin (FLOMAX) 0.4 mg Cap TAKE 1 CAPSULE(0.4 MG) BY MOUTH EVERY DAY 30 capsule 2    testosterone (ANDROGEL) 20.25 mg/1.25 gram (1.62 %) GlPm Apply 4 pumps to shoulders daily 2 each 5    triamcinolone acetonide 0.1% (KENALOG) 0.1 % cream Apply topically 2 (two) times daily. 45 g 2    warfarin (COUMADIN) 4 MG tablet Take 1 tablet (4 mg total) by mouth Daily. And as directed by Coumadin Clinic 45 tablet 6    clotrimazole (LOTRIMIN) 1 % cream Apply topically once daily. for 14 days 45 g 0     No current facility-administered medications for this visit.      Physical Exam:   BP (!) 123/59 (BP Location: Left arm, Patient Position: Sitting, BP Method: Medium (Automatic))   Pulse (!) 50   Temp 98.3 °F (36.8 °C) (Oral)   Ht 6' (1.829 m)   Wt 90 kg (198 lb 6.6 oz)   SpO2 97%   BMI 26.91 kg/m²      ECOG Performance status: 0    Physical Exam  Vitals reviewed.   Constitutional:       General: He is not in acute distress.     Appearance: Normal appearance. He is not ill-appearing, toxic-appearing or diaphoretic.   HENT:      Head: Normocephalic and atraumatic.      Right Ear: External ear normal.      Left Ear: External ear  normal.      Nose: Nose normal. No congestion.      Mouth/Throat:      Pharynx: Oropharynx is clear.   Eyes:      General: No scleral icterus.     Extraocular Movements: Extraocular movements intact.      Conjunctiva/sclera: Conjunctivae normal.      Pupils: Pupils are equal, round, and reactive to light.   Neck:      Comments: L neck wound well-healed  Cardiovascular:      Rate and Rhythm: Normal rate and regular rhythm.   Pulmonary:      Effort: Pulmonary effort is normal. No respiratory distress.   Chest:      Comments: RCW port  Abdominal:      General: There is no distension.      Palpations: Abdomen is soft.      Tenderness: There is no abdominal tenderness.   Musculoskeletal:         General: No swelling.      Cervical back: Normal range of motion.   Lymphadenopathy:      Cervical: No cervical adenopathy.   Skin:     Coloration: Skin is not jaundiced.      Findings: Rash (grade I rash to upper chest/back.) present. No bruising or erythema.   Neurological:      General: No focal deficit present.      Mental Status: He is alert and oriented to person, place, and time. Mental status is at baseline.      Cranial Nerves: No cranial nerve deficit.      Motor: No weakness.      Gait: Gait normal.   Psychiatric:         Mood and Affect: Mood normal.         Behavior: Behavior normal.         Thought Content: Thought content normal.         Judgment: Judgment normal.       Labs:   No results found for this or any previous visit (from the past 48 hour(s)).    Imaging:    10/13/23 - CT CAP:    Impression:     1. Postsurgical change of esophagectomy and gastric pull-through for esophageal adenocarcinoma.  No signs of recurrence or metastatic disease.  2. Additional findings as above.  RECIST SUMMARY:     Date of prior examination for comparison: 04/11/2023     No measurable lesions for RECIST criteria.    Path:   3/14/23:  Final Pathologic Diagnosis 1. LYMPH NODE, LEVEL 7, EXCISION:   One benign lymph node (0/1)   2.  TOTAL THORACIC ESOPHAGECTOMY AND PROXIMAL STOMACH:   Adenocarcinoma, moderately differentiated, 3.0 cm   Margins are negative   Tumor invades adventitia   Extensive residual cancer with no evident tumor regression (poor or no   response, score 3)   Eleven benign lymph nodes (0/11)   3. RESIDUAL CONDUIT, EXCISION:   Negative for malignancy   SYNOPTIC REPORT   Procedure - Esophageal gastrectomy   Tumor site - GE Junction   Relationship of tumor to esophagogastric junction - Lesion is central at GE   junction   Tumor size - 3.0 x 3.1cm   Histologic type - Adenocarcinoma   Histologic grade - Moderately differentiated   Microscopic tumor extension - Tumor invades adventitia   Margins - All margins of resection are uninvolved, proximal, distal and   adventitial margins are negative   Treatment effect - Extensive residual cancer with no evident tumor regression   (poor or no response, score 3)   Lymphovascular invasion - Not identified   Pathologic staging - yp T3 N0 Mx   Lymph nodes examined - 12   Lymph nodes involved - 0   Additional pathologic findings - Intestinal metaplasia present   MMR-IHC has been performed on previous biopsy ([id number]) and shows all 4   antibodies intact      Assessment:       1. Esophageal adenocarcinoma        Plan:     # Esophageal adenocarcinoma   Mr. Person is a pleasant 67 year old male who presents to our clinic for management of esophageal cancer. He initially presented with dysphagia, and further workup confirmed a stage II adenocarcinoma, pMMR. Tumor staged T3N0Mx by endosonographic criteria, JEVON. CT CAP on 12/14/22 confirmed no evidence of metastatic disease.    Previously conversation regarding his diagnosis and treatment options.  Recommended perioperative chemo/radiation per the CROSS trial. Discussed chemoradiation for ~5 weeks with 5 doses of weekly carboplatin and taxol administered. Plan to obtain restaging scans 4-5 weeks after radiation completed.     He was a candidate for  a cooperative group trial assessing perioperative immunotherapy treatment. He consented for this study - ECOG-ACRIN [id number]: A Phase II/III Study of Dilcia-operative Nivolumab and Ipilimumab in Patients with Locoregional Esophageal and Gastroesophageal Junction Adenocarcinoma    Previously met with Dr. Santana Brown who agreed he was a surgical candidate.  Previously met with Dr. Javier Moulton who will be treating him with radiation.    Began cycle 1 carboplatin/paclitaxel/nivolumab on trial on 12/29/22.  Received cycle 4 of weekly chemotherapy on trial on 1/20/23.   We held chemotherapy for week 5 because of thrombocytopenia per protocol.    Completed radiation on 2/1/23.    Restaging PET/CT personally reviewed on 3/1/23 shows interval decreased FDG avidity in esophageal tumor, no new sites of disease.  CT CAP 3/2/23 shows stable esophageal thickening.    Underwent robotic esophagectomy on 3/14/23 with Dr. Brown.  Pathology showed negative margins, ypT3 N0 tumor with 0 of 12 lymph nodes involved.  No treatment effect was noted on the tumor tissue.    Discussed that per the [id number] protocol will proceed with randomization to adjuvant nivolumab +/- ipilimumab. Patient randomized to receive adjuvant Nivolumab, started cycle 1 at 480 mg q4 weeks 5/1/23. Plan for twelve months per protocol.    Tolerating very well. Grade 1 pruritis.    Repeat imaging after cycle 6 shows DAVE.    Proceed with cycle 12 today at 480 mg q4 weeks.  Labs to be drawn today prior to infusion.   Timeout occurred with myself and Maria Esther SORTO. Orders signed.  CT C/A/P and esophagram ordered    Underwent dilation with Dr. Brown on 5/31/23 with temporary improvement in dysphagia. Weight stable.  Still having some dysphagia so esophogram ordered. No worsening at this time.   PD-L1 CPS 30.    RTC in 4 weeks.    # Vocal cord paralysis  Post-op. S/p injection by ENT.  Stable.  Last evaluated 9/11/23 by ENT with improvement.    Esophagram  ordered    # HTN, HLD, DM, CKD  Following with PCP Dr. Wilson and nephrologist Dr. Oconnell.   BP stable in clinic today. Asymptomatic. Has been off his HCTZ/lisinopril because of prior hypotension.  Cr stable.    # CAD, A fib, CHF  Following with cardiologist Dr. Nick & EP Dr. Phillip.   Was on Xarelto. Now on warfarin. Being monitored by coumadin clinic. Labs monitored on regular basis.   Continue medical management.     # Rash  Grade I, very mild. Likely 2/2 immunotherapy.   Continue triamcinolone - symptoms controlled at this time. Refill sent.   Continue to monitor.     Follow up: per research.      Sha El D.O.  Hematology/Oncology Fellow, PGY-V       Route Chart for Scheduling    Med Onc Chart Routing      Follow up with physician 4 weeks. 4 weeks with Leonardo   Follow up with SAMUEL    Infusion scheduling note    Injection scheduling note Please schedule cycle 13 of nivolumab in 4 weeks   Labs CBC and CMP   Scheduling:  Preferred lab:  Lab interval:  Labs in infusion suite via port   Imaging CT chest abdomen pelvis and other   Please schedule esopagram first available and CT C/A/P in 8 weeks   Pharmacy appointment    Other referrals          Treatment Plan Information   [facility] [id number] ARM C ADJUVANT NIVOLUMAB STEP 2   Miki Medrano MD   Upcoming Treatment Dates - [facility] [id number] ARM C ADJUVANT NIVOLUMAB STEP 2    3/4/2024       Chemotherapy       INV nivolumab 480 mg in INV sodium chloride 0.9 % 100 mL chemo infusion  4/1/2024       Chemotherapy       INV nivolumab 480 mg in INV sodium chloride 0.9 % 100 mL chemo infusion  4/29/2024       Chemotherapy       INV nivolumab 480 mg in INV sodium chloride 0.9 % 100 mL chemo infusion    Supportive Plan Information  IV FLUIDS AND ELECTROLYTES   Miki Medrano MD   Upcoming Treatment Dates - IV FLUIDS AND ELECTROLYTES    No upcoming days in selected categories.    Therapy Plan Information  Flushes  heparin, porcine (PF) 100 unit/mL injection flush 500  Units  500 Units, Intravenous, Every visit  sodium chloride 0.9% flush 10 mL  10 mL, Intravenous, Every visit

## 2024-03-04 NOTE — PLAN OF CARE
1600-Pt tolerated INV Nivolumab infusion well, no complications or side effects, POC and meds discussed with pt, pt aware of upcoming appts, pt knows to call MD with any questions or concerns. Pt ambulated off unit, no distress noted.

## 2024-03-04 NOTE — PROGRESS NOTES
[facility]tico[facility]    03/04/2024 3:41 PM    Lukasz Person (68 y.o.) is followed by the [facility].      Assessment/Plan:    Lukasz Person presents today with supratherapeutic INR. Goal INR 2.0-3.0    Assessment of patient findings per MA/LPN and chart review:   The following significant findings were found:  None    Recommendation for patient's warfarin regimen:   Due to supratherapeutic INR, patient was instructed to take a reduced warfarin dose today, and weekly warfarin dose decreased due to repeated supratherapeutic INRs.    Recommended repeat INR in 1 week      Melissa Kinsey, PharmD, BCPS  Clinical Pharmacist - Coumadin Clinic  Preferred Contact: Secure Messaging or In Basket Message

## 2024-03-05 ENCOUNTER — PATIENT MESSAGE (OUTPATIENT)
Dept: REHABILITATION | Facility: HOSPITAL | Age: 69
End: 2024-03-05
Payer: MEDICARE

## 2024-03-05 ENCOUNTER — CLINICAL SUPPORT (OUTPATIENT)
Dept: REHABILITATION | Facility: HOSPITAL | Age: 69
End: 2024-03-05
Payer: MEDICARE

## 2024-03-05 DIAGNOSIS — F43.29 STRESS AND ADJUSTMENT REACTION: ICD-10-CM

## 2024-03-05 DIAGNOSIS — R53.81 PHYSICAL DECONDITIONING: Primary | ICD-10-CM

## 2024-03-05 PROCEDURE — 97110 THERAPEUTIC EXERCISES: CPT

## 2024-03-05 PROCEDURE — 97535 SELF CARE MNGMENT TRAINING: CPT

## 2024-03-05 PROCEDURE — 97112 NEUROMUSCULAR REEDUCATION: CPT

## 2024-03-05 NOTE — PROGRESS NOTES
[facility]  Occupational Therapy Treatment Note - Therapeutic Yoga Progam    Date: 2/27/2024  Name: Lukasz Person  Clinic Number: [id number]    Therapy Diagnosis:       Physician: Betina Valdovinos, *    Physician Orders: Eval and Treat   Medical Diagnosis from Referral: Chronic low back pain [M54.50, G89.29], Poor posture [R29.3], Esophageal adenocarcinoma [C15.9]  Evaluation Date: 8/22/2023  Authorization Period Expiration: 1/31/24  Plan of Care Expiration: 05/22/24  Progress Note Due: 3/6/24  Visit # / Visits authorized: 14/20     Time in:  10:30 am  Time Out:   11:15 am  Total Billable Time: 45 minutes    SUBJECTIVE     Pt reports: I raked my yard and my left shoulder is really hurting.  He was compliant with home exercise program given last session.   Response to previous treatment: stronger  Functional change: easier to get up and down stairs.     Pain:  5/10 in left shoulder on arrival and 0/10 at end of session      OBJECTIVE     Objective Measures updated at progress report unless specified.    Patient Specific Functional Scale:           Activity 8/22/23 11/1/23 12/6/23 1/5/24 2/6/24     Poor balance 5    5  6      7     8   2.  Carrying 20 pounds or more 7     6  7       7      8   3.  Challenge with stairs 5     6  6      7     8   4.  Endurance  4      6  7      7      8   5.               6.             SCORE 5.25    5.75    6.5     7.0    8.0      Total Score = Sum of activity scores / number of activities  Minimum Detectable Change (90% CII) for average score = 2 points  Minimum Detectable Change (90% CI) for single activity score = 3 points       Treatment     Lukasz received the treatments listed below:       Date 1/5/24 1/9/24 1/23/24 2/6/23 2/14/23 2/20/24 2/27/24 3/5/24   Therapeutic Yoga Exercises / Neuromuscular Re-education  30 minutes  PN 30 minutes 30 minutes 30 minutes 30 minutes 30 minutes 30 min. 30 minutes   Seated Yoga   chair yoga:  Cat-Cow,forward  bend, back bend, twist, figure 4 chair yoga:  Cat-Cow,forward bend, back bend, twist, figure 4  chair yoga:  Cat-Cow,forward bend, back bend, twist,  chair yoga:  Cat-Cow,forward bend, back bend, twist, figure 4, side body stretch,   On bolster: hamstring stretch with strap,  backbends, bound angle, cross legged, chair yoga:  Cat-Cow,forward bend, back bend, twist,    Quadruped    Cat-cow   Bird dog x 3,cat cow x 6,    Supine Hamstring release with belt,Modified boat pose with block 3 x 5, Twist x 2,( eagle and wide feet),  knees to chest, happy baby flow, knee to chest flow, figure 4,  amstring release with belt,Modified boat pose with block 3 x 5, Twist x 2,( eagle and wide feet),  knees to chest, happy baby flow, knee to chest flow, figure 4,  hamstring release with belt,Modified boat pose with block and head lifted, 3 x 5, Twist x 2,( eagle and wide feet),  knees to chest, happy baby flow, knee to chest flow, figure 4, leg lifts, bridge x 2, hamstring release with belt,Modified boat pose with block and head lifted, 3 x 5, Twist x 2,( eagle and wide feet),  knees to chest, happy baby flow, knee to chest flow, figure 4, leg lifts, bridge x 2 hamstring release with belt,Modified boat pose with block and head lifted, 3 x 5, Twist x 2,( eagle and wide feet),  knees to chest, happy baby flow, knee to chest flow, figure 4, leg lifts, bridge x 2, bound angle x 5 breaths,           hamstring release with sheet, Modified boat pose with block and head lifted, 2 x 5, Twist x 2,( eagle and wide feet),  knees to chest, happy baby flow, knee to chest flow,  On bolster:  Modified boat pose with block and head lifted, 2 x 5, Twist x 2,( eagle and wide feet),  knees to chest, happy baby flow, knee to chest flow,  Modified boat pose with block and head lifted, 3 x 5, Twist x 2, knees to chest, happy baby flow, knee to chest flow   Prone   sphinx sphinx Sphinx x 2 Sphinx/ sphinx plank x 3 Sphinx/ sphinx plank x 3    standing  Warrior 2, chair pose/volcano x3, wide straddle with hands on chair, standing back ext with hands on sacrum and heels pulling together,  chair pose/volcano x3, wide straddle with hands on chair, standing back ext with hands on sacrum and heels pulling together, transfer floor to stand with supervision and chair, hair pose/volcano x3, wide straddle with hands on chair, standing back ext with hands on sacrum and heels pulling together, transfer floor to stand without chair, chair pose/volcano x2, wide straddle with hands on chair, standing back ext with hands on sacrum and heels pulling together,  chair pose/volcano x2, wide straddle with hands on chair, standing back ext with hands on sacrum and heels pulling together, warrior 2, side angle with chair,  chair pose/volcano x2, wide straddle with hands on chair, standing back ext with hands on sacrum and heels pulling together, warrior 2, chair pose/volcano x2, wide straddle with hands on chair, standing back ext with hands on sacrum and heels pulling together, warrior 2, chair pose x2, wide straddle with hands on chair, standing back ext with hands on sacrum and heels pulling together, warrior 2,   L UE ROM and strength        Tool assisted device left shoulder and upper arm, deeper tissue massage to bicep.                Self-Care/Home Management  / Therapeutic Activities  15 minutes 15 minutes 15 minutes  15 minutes 15 minutes 15 min. 15 min.              Relaxation techniques DB,body scan,  DB,body scan, metta  DB,body scan, metta   DB,body scan, metta  DB,body scan, metta  DB,body scan, metta     Restorative  Supine with bolster unde knees Bridge and bound angle with bolsters Bridge and bound angle with bolsters  Supine in crossed ankle with legs on 2 stacked bolsters Supine on treatment table with legs up wall using 2 bolsters, Supine on floor in bolster supported bridge and bound angle    Activity Pacing                                 Stress  Management/Education  - physiology of yoga/meditation and immune health - physiology of yoga/meditation and immune health physiology of yoga/meditation and immune health  - physiology of yoga/meditation and immune health - physiology of yoga/meditation and immune health - physiology of yoga/meditation and immune health  physiology of yoga/meditation and immune health                  Patient Education and Home Exercises      Education provided:   - - physiology of yoga/meditation and immune health  - Progress towards goals     Written Home Exercises Provided: yes.  Exercises were reviewed and Lukasz was able to demonstrate them prior to the end of the session.  Lukasz demonstrated good  understanding of the HEP provided. See EMR under Patient Instructions for exercises provided during therapy sessions.       Assessment      In today's session, Lukasz practiced some of his HEP focusing  on strengthening, stretching and relaxation techniques. We focused on poses today to build strength and balance as well as abdominal engagement in all poses. OT addressed his left shoulder pain flare up due to over-use. He reported that this releivedhis pain and allowed him to participate better.  He required moderate verbal and neuromuscular cues.     Lukasz is progressing well towards his goals and patient prognosis is Good.    Progress Update: Lukasz is making good progress towards his goals.  His PSFS score increased from   7.0 to 8.0 indicating increased functional ability.  He reports his balance and endurance are improving with yoga exercise. He is able to transfer from stand to floor without using a chair. He also reports he is using prayer and breathing techniques to help with stress/immune health.   Patient will continue to benefit from skilled outpatient occupational therapy to address the deficits listed in the problem list on initial evaluation provide pt/family education and to maximize pt's level of independence in  the home and community environment. Pt's spiritual, cultural and educational needs considered and pt agreeable to plan of care and goals.    Anticipated barriers to occupational therapy: none    Goals:  Short Term Goals: 6 weeks      Goal # Goal Status   1 Patient will demonstrate independence with diaphragmatic breathing in sit and supine. met   2 Pt. will identify resource for audio body scan to assist with stress management/immune health met   3 Patient will identify 2 new stress coping skills for stress management/immune health. met   4 Patient will identify activity pacing problems.  Patient will then implement new plan for daily activity to increase endurance for ADL's. Progressing      Long Term Goals: 12 weeks      Goal # Goal Status   1 Patient will demonstrate independence with yoga Home Exercise Program to increase strength and endurance for ADL's. Progressing   2 Patient will demonstrate independence with relaxation techniques to manage stress for immune health. Progressing   3 Patient will verbalize good understanding of stress/immune health relationship.  Progressing   4            PLAN     Plan of care Certification: 2/27/2024 to 5/22/24.    Outpatient Occupational Therapy 1 times weekly for 14 weeks to include the following interventions: Neuromuscular Re-ed, Patient Education, Self Care, Therapeutic Activities, and Therapeutic Exercise.     Joanna Kimball, OT

## 2024-03-11 ENCOUNTER — CLINICAL SUPPORT (OUTPATIENT)
Dept: REHABILITATION | Facility: HOSPITAL | Age: 69
End: 2024-03-11
Payer: MEDICARE

## 2024-03-11 DIAGNOSIS — M25.512 LEFT SHOULDER PAIN, UNSPECIFIED CHRONICITY: Primary | ICD-10-CM

## 2024-03-11 PROCEDURE — 97110 THERAPEUTIC EXERCISES: CPT

## 2024-03-11 PROCEDURE — 97140 MANUAL THERAPY 1/> REGIONS: CPT

## 2024-03-11 NOTE — PROGRESS NOTES
[facility]  Occupational Therapy Treatment Note     Date: 3/11/2024  Name: Lukasz Person  Clinic Number: [id number]      Therapy Diagnosis: left shoulder pain       Physician: Kirk Wilson MD    Physician Orders:  eval and treat   Medical Diagnosis: S46.012A (ICD-10-CM) - Traumatic tear of left rotator cuff, unspecified tear extent, initial encounter   Surgical Procedure/ Date :reports no shoulder sx   Evaluation Date: 1/25/2024  Insurance:Medicare   Insurance Authorization period Expiration: 1/21/25   Plan of Care Certification Period: 4/30/24      Visit # / Visits Authortized: 4 / 20   Time In:9:30 am   Time Out:10;30 am   Total Billable Time: 60 minutes       Precautions: Standard, needs to have head elevated in supine due to esophageal cancer and reconstruction     Subjective     Patient reports: no pain  He was compliant with home exercise program given last session.   Response to previous treatment:first tx  Functional change: none to be noted to this date     Pain: 0/10  Location: left shoulder      Objective     Objective Measures updated at progress report unless specified.    Active Range of Motion: Measured in degrees     Shoulder Right Left  Left 2/29/24   Flexion 155 130 160    Abduction 130 130 150   ER at 0 80 60 60   ER at 90 60  60  60   IR 60 60 T 12          Shoulder Right   Shoulder flexion: 4/5   Shoulder Abduction: 4/5   Shoulder ER 4/5   Shoulder IR 4/5   Lower Trap 5/5   Middle Trap 5/5   Rhomboids 5/5         Scapular Control/Dyskinesis:     Normal / Subtle / Obvious  Comments    Left  Protracted      Right  Protracted       He reports that he does sleep         Palpation Shoulder:     Lateral shoulder pain                CMS Impairment/Limitation/Restriction for FOTO initial  Survey     Therapist reviewed FOTO scores for Lukasz Person on 1/25/2024.   FOTO documents entered into EPIC - see Media section.      17 intake score           Treatment     Lukasz received  the treatments listed below:     Hot pack to left shoulder X 10 minutes      Manual  therapy X 10 minutes Shoulder approximation with isometrics       therapeutic exercises to develop strength for 38  minutes including:  - un weighted dowel flexion, abduction, scaption, ER x 10 reps (3 sets)   -   Red t band alcon  ( rows) , Er walk outs , IR walk outs and shoulder adduction  20 reps ( added to HEP )     Brueggers red band   And shoulder stabilization with alphabet  on wall    Supine chest press ( serratus push )  1-3# weight 20 reps ( added to HEP)           Patient Education and Home Exercises     Education provided:   - added table slides with HEP   - Progress towards goals     Written Home Exercises Provided: Patient instructed to cont prior HEP.  Exercises were reviewed and Lukasz was able to demonstrate them prior to the end of the session.  Lukasz demonstrated good  understanding of the home exercise program provided. See electronic medical record under Patient Instructions for exercises provided during therapy sessions.       Assessment     Good debra today. Good ROM no pain , increased ROM noted no increased pain   Pt reporting increased improvement only slight anterior shoulder pain    Lukasz is progressing well towards his goals and there are no updates to goals at this time. Pt prognosis is Good.     Patient will continue to benefit from skilled outpatient occupational therapy to address the deficits listed in the problem list on initial evaluation provide patient/family education and to maximize patient's level of independence in the home and community environment.     Patient's spiritual, cultural and educational needs considered and patient agreeable to plan of care and goals.      Anticipated barriers to occupational therapy:     Goals:  Short Term (4  weeks on 2/29/24 ):  1)   Patient to be IND with HEP and modalities for pain management  2)   Increase ROM 15 degrees in left shoulder motion to  increase functional UE use for  shoulder flexion, scaption and ER   3)  Improve muscle strength ing 4/+5 by shoulder motions  in order to carry items with left shoulder .      Long Term (by discharge):  1)   Pt will report 0 out of 10 pain with left shoulder . Reaching forward or overhead into closets .  2)   Patient to increase score by  10 on FOTO to demonstrate improved perception of functional left use.  3)   Pt will return to prior level of function for ADLs and household management.     Plan     Updates/Grading for next session:     Sudha Murillo, OT   3/11/2024

## 2024-03-12 ENCOUNTER — ANTI-COAG VISIT (OUTPATIENT)
Dept: CARDIOLOGY | Facility: CLINIC | Age: 69
End: 2024-03-12
Payer: MEDICARE

## 2024-03-12 ENCOUNTER — LAB VISIT (OUTPATIENT)
Dept: LAB | Facility: HOSPITAL | Age: 69
End: 2024-03-12
Payer: MEDICARE

## 2024-03-12 ENCOUNTER — CLINICAL SUPPORT (OUTPATIENT)
Dept: REHABILITATION | Facility: HOSPITAL | Age: 69
End: 2024-03-12
Payer: MEDICARE

## 2024-03-12 DIAGNOSIS — Z79.01 LONG TERM (CURRENT) USE OF ANTICOAGULANTS: ICD-10-CM

## 2024-03-12 DIAGNOSIS — R53.81 PHYSICAL DECONDITIONING: Primary | ICD-10-CM

## 2024-03-12 DIAGNOSIS — F43.29 STRESS AND ADJUSTMENT REACTION: ICD-10-CM

## 2024-03-12 DIAGNOSIS — I48.0 PAROXYSMAL ATRIAL FIBRILLATION: Primary | Chronic | ICD-10-CM

## 2024-03-12 LAB
INR PPP: 2.3 (ref 0.8–1.2)
PROTHROMBIN TIME: 23.4 SEC (ref 9–12.5)

## 2024-03-12 PROCEDURE — 97110 THERAPEUTIC EXERCISES: CPT

## 2024-03-12 PROCEDURE — 97112 NEUROMUSCULAR REEDUCATION: CPT

## 2024-03-12 PROCEDURE — 85610 PROTHROMBIN TIME: CPT | Performed by: INTERNAL MEDICINE

## 2024-03-12 PROCEDURE — 93793 ANTICOAG MGMT PT WARFARIN: CPT | Mod: ,,,

## 2024-03-12 PROCEDURE — 97535 SELF CARE MNGMENT TRAINING: CPT

## 2024-03-12 PROCEDURE — 36415 COLL VENOUS BLD VENIPUNCTURE: CPT | Performed by: INTERNAL MEDICINE

## 2024-03-12 NOTE — PROGRESS NOTES
[facility] OUTPATIENT THERAPY AND WELLNESS  Occupational Therapy Progress and Treatment Note - Therapeutic Yoga Progam    Date: 3/12/2024  Name: Lukasz Person  Clinic Number: [id number]    Therapy Diagnosis:       Physician: Betina Valdovinos, *    Physician Orders: Eval and Treat   Medical Diagnosis from Referral: Chronic low back pain [M54.50, G89.29], Poor posture [R29.3], Esophageal adenocarcinoma [C15.9]  Evaluation Date: 8/22/2023  Authorization Period Expiration: 1/31/24  Plan of Care Expiration: 05/22/24  Progress Note Due: 4/12/24  Visit # / Visits authorized: 15/20     Time in:  10:30 am  Time Out:   11:15 am  Total Billable Time: 45 minutes    SUBJECTIVE     Pt reports: I raked my yard and my left shoulder is really hurting.  He was compliant with home exercise program given last session.   Response to previous treatment: stronger  Functional change: easier to get up and down stairs.     Pain:  5/10 in left shoulder on arrival and 0/10 at end of session      OBJECTIVE     Objective Measures updated at progress report unless specified.    Patient Specific Functional Scale:           Activity 8/22/23  11/1/23 12/6/23   1/5/24 2/6/24  3/12/24    Poor balance 5    5  6      7     8     8   2.  Carrying 20 pounds or more 7     6  7       7      8      10   3.  Challenge with stairs 5     6  6      7     8      8   4.  Endurance  4      6  7      7      8      8   5.                6.              SCORE 5.25    5.75    6.5     7.0    8.0      8.2      Total Score = Sum of activity scores / number of activities  Minimum Detectable Change (90% CII) for average score = 2 points  Minimum Detectable Change (90% CI) for single activity score = 3 points       Treatment     Lukasz received the treatments listed below:       Date 1/5/24 1/9/24 1/23/24 2/6/23 2/14/23 2/20/24 2/27/24 3/5/24 3/12/24   Therapeutic Yoga Exercises / Neuromuscular Re-education  30 minutes  PN 30 minutes 30 minutes 30 minutes 30 minutes 30  minutes 30 min. 30 minutes 30 minutes  PN   Seated Yoga   chair yoga:  Cat-Cow,forward bend, back bend, twist, figure 4 chair yoga:  Cat-Cow,forward bend, back bend, twist, figure 4  chair yoga:  Cat-Cow,forward bend, back bend, twist,  chair yoga:  Cat-Cow,forward bend, back bend, twist, figure 4, side body stretch,   On bolster: hamstring stretch with strap,  backbends, bound angle, cross legged, chair yoga:  Cat-Cow,forward bend, back bend, twist,  Seated hamstring stretch on chair,   Quadruped    Cat-cow   Bird dog x 3,cat cow x 6,     Supine Hamstring release with belt,Modified boat pose with block 3 x 5, Twist x 2,( eagle and wide feet),  knees to chest, happy baby flow, knee to chest flow, figure 4,  amstring release with belt,Modified boat pose with block 3 x 5, Twist x 2,( eagle and wide feet),  knees to chest, happy baby flow, knee to chest flow, figure 4,  hamstring release with belt,Modified boat pose with block and head lifted, 3 x 5, Twist x 2,( eagle and wide feet),  knees to chest, happy baby flow, knee to chest flow, figure 4, leg lifts, bridge x 2, hamstring release with belt,Modified boat pose with block and head lifted, 3 x 5, Twist x 2,( eagle and wide feet),  knees to chest, happy baby flow, knee to chest flow, figure 4, leg lifts, bridge x 2 hamstring release with belt,Modified boat pose with block and head lifted, 3 x 5, Twist x 2,( eagle and wide feet),  knees to chest, happy baby flow, knee to chest flow, figure 4, leg lifts, bridge x 2, bound angle x 5 breaths,           hamstring release with sheet, Modified boat pose with block and head lifted, 2 x 5, Twist x 2,( eagle and wide feet),  knees to chest, happy baby flow, knee to chest flow,  On bolster:  Modified boat pose with block and head lifted, 2 x 5, Twist x 2,( eagle and wide feet),  knees to chest, happy baby flow, knee to chest flow,  Modified boat pose with block and head lifted, 3 x 5, Twist x 2, knees to chest, happy baby flow,  knee to chest flow Modified boat pose with block and head lifted, 3 x 5, Twist x 2, knees to chest, happy baby flow, knee to chest flow   Prone   sphinx sphinx Sphinx x 2 Sphinx/ sphinx plank x 3 Sphinx/ sphinx plank x 3  Sphinx/ sphinx plank x 3   standing Warrior 2, chair pose/volcano x3, wide straddle with hands on chair, standing back ext with hands on sacrum and heels pulling together,  chair pose/volcano x3, wide straddle with hands on chair, standing back ext with hands on sacrum and heels pulling together, transfer floor to stand with supervision and chair, hair pose/volcano x3, wide straddle with hands on chair, standing back ext with hands on sacrum and heels pulling together, transfer floor to stand without chair, chair pose/volcano x2, wide straddle with hands on chair, standing back ext with hands on sacrum and heels pulling together,  chair pose/volcano x2, wide straddle with hands on chair, standing back ext with hands on sacrum and heels pulling together, warrior 2, side angle with chair,  chair pose/volcano x2, wide straddle with hands on chair, standing back ext with hands on sacrum and heels pulling together, warrior 2, chair pose/volcano x2, wide straddle with hands on chair, standing back ext with hands on sacrum and heels pulling together, warrior 2, chair pose x2, wide straddle with hands on chair, standing back ext with hands on sacrum and heels pulling together, warrior 2, chair pose x2, wide straddle with hands on chair, standing back ext with hands on sacrum and heels pulling together, warrior 2,   L UE ROM and strength        Tool assisted device left shoulder and upper arm, deeper tissue massage to bicep.                  Self-Care/Home Management  / Therapeutic Activities  15 minutes 15 minutes 15 minutes  15 minutes 15 minutes 15 min. 15 min.                Relaxation techniques DB,body scan,  DB,body scan, metta  DB,body scan, metta   DB,body scan, metta  DB,body scan, metta  DB,body  scan, metrylie Elise breath, DB, body scan,   Restorative  Supine with bolster unde knees Bridge and bound angle with bolsters Bridge and bound angle with bolsters  Supine in crossed ankle with legs on 2 stacked bolsters Supine on treatment table with legs up wall using 2 bolsters, Supine on floor in bolster supported bridge and bound angle  Supine on mat with 2 bolsters under LE's.   Activity Pacing                                    Stress Management/Education  - physiology of yoga/meditation and immune health - physiology of yoga/meditation and immune health physiology of yoga/meditation and immune health  - physiology of yoga/meditation and immune health - physiology of yoga/meditation and immune health - physiology of yoga/meditation and immune health  physiology of yoga/meditation and immune health - physiology of yoga/meditation and immune health                   Patient Education and Home Exercises      Education provided:   - - physiology of yoga/meditation and immune health  - Progress towards goals     Written Home Exercises Provided: yes.  Exercises were reviewed and Lukasz was able to demonstrate them prior to the end of the session.  Lukasz demonstrated good  understanding of the HEP provided. See EMR under Patient Instructions for exercises provided during therapy sessions.       Assessment      In today's session, Lukasz practiced some of his HEP focusing  on strengthening, stretching and relaxation techniques. We focused on poses today to build strength and balance as well as abdominal engagement in all poses.  We added a hamstring stretch from a chair with albert to his HEP.  He required moderate verbal and neuromuscular cues.     Lukasz is progressing well towards his goals and patient prognosis is Good.    Progress Update: Lukasz is making good progress towards his goals.  His PSFS score increased from   8.0 to 8.2 indicating slightly increased functional ability.  He reports his balance  and endurance are improving with yoga exercise. He is able to transfer from stand to floor without using a chair and 1 flight of stairs are not a challenge He continues to use prayer and breathing techniques to help with stress/immune health.   Patient will continue to benefit from skilled outpatient occupational therapy to address the deficits listed in the problem list on initial evaluation provide pt/family education and to maximize pt's level of independence in the home and community environment. Pt's spiritual, cultural and educational needs considered and pt agreeable to plan of care and goals.    Anticipated barriers to occupational therapy: none    Goals:  Short Term Goals: 6 weeks      Goal # Goal Status   1 Patient will demonstrate independence with diaphragmatic breathing in sit and supine. met   2 Pt. will identify resource for audio body scan to assist with stress management/immune health met   3 Patient will identify 2 new stress coping skills for stress management/immune health. met   4 Patient will identify activity pacing problems.  Patient will then implement new plan for daily activity to increase endurance for ADL's. Progressing      Long Term Goals: 12 weeks      Goal # Goal Status   1 Patient will demonstrate independence with yoga Home Exercise Program to increase strength and endurance for ADL's. Progressing   2 Patient will demonstrate independence with relaxation techniques to manage stress for immune health. Progressing   3 Patient will verbalize good understanding of stress/immune health relationship.  Progressing   4            PLAN     Plan of care Certification: 3/12/2024 to 5/22/24.    Outpatient Occupational Therapy 1 times weekly for 14 weeks to include the following interventions: Neuromuscular Re-ed, Patient Education, Self Care, Therapeutic Activities, and Therapeutic Exercise.     Joanna Kimball, OT

## 2024-03-15 ENCOUNTER — PATIENT MESSAGE (OUTPATIENT)
Dept: HEMATOLOGY/ONCOLOGY | Facility: CLINIC | Age: 69
End: 2024-03-15
Payer: MEDICARE

## 2024-03-19 ENCOUNTER — LAB VISIT (OUTPATIENT)
Dept: LAB | Facility: HOSPITAL | Age: 69
End: 2024-03-19
Payer: MEDICARE

## 2024-03-19 ENCOUNTER — PATIENT MESSAGE (OUTPATIENT)
Dept: CARDIOLOGY | Facility: CLINIC | Age: 69
End: 2024-03-19

## 2024-03-19 ENCOUNTER — ANTI-COAG VISIT (OUTPATIENT)
Dept: CARDIOLOGY | Facility: CLINIC | Age: 69
End: 2024-03-19
Payer: MEDICARE

## 2024-03-19 ENCOUNTER — CLINICAL SUPPORT (OUTPATIENT)
Dept: REHABILITATION | Facility: HOSPITAL | Age: 69
End: 2024-03-19
Payer: MEDICARE

## 2024-03-19 DIAGNOSIS — I48.0 PAROXYSMAL ATRIAL FIBRILLATION: Primary | Chronic | ICD-10-CM

## 2024-03-19 DIAGNOSIS — R53.81 PHYSICAL DECONDITIONING: Primary | ICD-10-CM

## 2024-03-19 DIAGNOSIS — F43.29 STRESS AND ADJUSTMENT REACTION: ICD-10-CM

## 2024-03-19 DIAGNOSIS — Z79.01 LONG TERM (CURRENT) USE OF ANTICOAGULANTS: ICD-10-CM

## 2024-03-19 LAB
INR PPP: 2.5 (ref 0.8–1.2)
PROTHROMBIN TIME: 25.1 SEC (ref 9–12.5)

## 2024-03-19 PROCEDURE — 97112 NEUROMUSCULAR REEDUCATION: CPT

## 2024-03-19 PROCEDURE — 97110 THERAPEUTIC EXERCISES: CPT

## 2024-03-19 PROCEDURE — 36415 COLL VENOUS BLD VENIPUNCTURE: CPT | Performed by: INTERNAL MEDICINE

## 2024-03-19 PROCEDURE — 97535 SELF CARE MNGMENT TRAINING: CPT

## 2024-03-19 PROCEDURE — 85610 PROTHROMBIN TIME: CPT | Performed by: INTERNAL MEDICINE

## 2024-03-19 PROCEDURE — 93793 ANTICOAG MGMT PT WARFARIN: CPT | Mod: ,,,

## 2024-03-19 NOTE — PROGRESS NOTES
[facility]    03/19/2024 12:00 PM    Lukasz Person (69 y.o.) is followed by the [facility].      Assessment/Plan:    Lukasz Person presents today with therapeutic INR. Goal INR 2.0-3.0    Assessment of patient findings per MA/LPN and chart review:   The following significant findings were found:  None    Recommendation for patient's warfarin regimen:   No change was made to warfarin therapy during this visit and patient has been instructed to continue their current warfarin regimen.    Recommended repeat INR in 2 weeks      Melissa Kinsey, PharmD, BCPS  Clinical Pharmacist - [facility]  Preferred Contact: Secure Messaging or In Basket Message

## 2024-03-19 NOTE — PROGRESS NOTES
[facility]  Occupational Therapy Progress and Treatment Note - Therapeutic Yoga Progam    Date: 3/19/2024  Name: Lukasz Person  Clinic Number: [id number]    Therapy Diagnosis:       Physician: Betina Valdovinos, *    Physician Orders: Eval and Treat   Medical Diagnosis from Referral: Chronic low back pain [M54.50, G89.29], Poor posture [R29.3], Esophageal adenocarcinoma [C15.9]  Evaluation Date: 8/22/2023  Authorization Period Expiration: 1/31/24  Plan of Care Expiration: 05/22/24  Progress Note Due: 4/12/24  Visit # / Visits authorized: 16/20     Time in:  9:30 am  Time Out:   10:15 am  Total Billable Time: 45 minutes    SUBJECTIVE     Pt reports: I raked my yard again and my left shoulder acted up a little.  He was compliant with home exercise program given last session.   Response to previous treatment: improved balance.  Functional change: improved balance and comfort with anything physical.    Pain:  5/10 in left shoulder on arrival and 0/10 at end of session      OBJECTIVE     Objective Measures updated at progress report unless specified.    Patient Specific Functional Scale:           Activity 8/22/23  11/1/23 12/6/23   1/5/24 2/6/24  3/12/24    Poor balance 5    5  6      7     8     8   2.  Carrying 20 pounds or more 7     6  7       7      8      10   3.  Challenge with stairs 5     6  6      7     8      8   4.  Endurance  4      6  7      7      8      8   5.                6.              SCORE 5.25    5.75    6.5     7.0    8.0      8.2      Total Score = Sum of activity scores / number of activities  Minimum Detectable Change (90% CII) for average score = 2 points  Minimum Detectable Change (90% CI) for single activity score = 3 points       Treatment     Lukasz received the treatments listed below:       Date 1/5/24 1/9/24 1/23/24 2/6/23 2/14/23 2/20/24 2/27/24 3/5/24 3/12/24 3/19/24   Therapeutic Yoga Exercises / Neuromuscular Re-education  30 minutes  PN 30 minutes  30 minutes 30 minutes 30 minutes 30 minutes 30 min. 30 minutes 30 minutes  PN 45 minutes   Seated Yoga   chair yoga:  Cat-Cow,forward bend, back bend, twist, figure 4 chair yoga:  Cat-Cow,forward bend, back bend, twist, figure 4  chair yoga:  Cat-Cow,forward bend, back bend, twist,  chair yoga:  Cat-Cow,forward bend, back bend, twist, figure 4, side body stretch,   On bolster: hamstring stretch with strap,  backbends, bound angle, cross legged, chair yoga:  Cat-Cow,forward bend, back bend, twist,  Seated hamstring stretch on chair, chair yoga:  Cat-Cow,forward bend, back bend, twist,    Quadruped    Cat-cow   Bird dog x 3,cat cow x 6,      Supine Hamstring release with belt,Modified boat pose with block 3 x 5, Twist x 2,( eagle and wide feet),  knees to chest, happy baby flow, knee to chest flow, figure 4,  amstring release with belt,Modified boat pose with block 3 x 5, Twist x 2,( eagle and wide feet),  knees to chest, happy baby flow, knee to chest flow, figure 4,  hamstring release with belt,Modified boat pose with block and head lifted, 3 x 5, Twist x 2,( eagle and wide feet),  knees to chest, happy baby flow, knee to chest flow, figure 4, leg lifts, bridge x 2, hamstring release with belt,Modified boat pose with block and head lifted, 3 x 5, Twist x 2,( eagle and wide feet),  knees to chest, happy baby flow, knee to chest flow, figure 4, leg lifts, bridge x 2 hamstring release with belt,Modified boat pose with block and head lifted, 3 x 5, Twist x 2,( eagle and wide feet),  knees to chest, happy baby flow, knee to chest flow, figure 4, leg lifts, bridge x 2, bound angle x 5 breaths,           hamstring release with sheet, Modified boat pose with block and head lifted, 2 x 5, Twist x 2,( eagle and wide feet),  knees to chest, happy baby flow, knee to chest flow,  On bolster:  Modified boat pose with block and head lifted, 2 x 5, Twist x 2,( eagle and wide feet),  knees to chest, happy baby flow, knee to chest  flow,  Modified boat pose with block and head lifted, 3 x 5, Twist x 2, knees to chest, happy baby flow, knee to chest flow Modified boat pose with block and head lifted, 3 x 5, Twist x 2, happy baby flow, knee to chest flow, bridge x 2, Modified boat pose with block and head lifted, 3 x 5, Twist x 2, happy baby flow, knee to chest flow, bridge x 2, hamstring stretch with sheet,    Prone   sphinx sphinx Sphinx x 2 Sphinx/ sphinx plank x 3 Sphinx/ sphinx plank x 3  Sphinx/ sphinx plank x 3    standing Warrior 2, chair pose/volcano x3, wide straddle with hands on chair, standing back ext with hands on sacrum and heels pulling together,  chair pose/volcano x3, wide straddle with hands on chair, standing back ext with hands on sacrum and heels pulling together, transfer floor to stand with supervision and chair, hair pose/volcano x3, wide straddle with hands on chair, standing back ext with hands on sacrum and heels pulling together, transfer floor to stand without chair, chair pose/volcano x2, wide straddle with hands on chair, standing back ext with hands on sacrum and heels pulling together,  chair pose/volcano x2, wide straddle with hands on chair, standing back ext with hands on sacrum and heels pulling together, warrior 2, side angle with chair,  chair pose/volcano x2, wide straddle with hands on chair, standing back ext with hands on sacrum and heels pulling together, warrior 2, chair pose/volcano x2, wide straddle with hands on chair, standing back ext with hands on sacrum and heels pulling together, warrior 2, chair pose x2, wide straddle with hands on chair, standing back ext with hands on sacrum and heels pulling together, warrior 2, chair pose x2, wide straddle with hands on chair, standing back ext with hands on sacrum and heels pulling together, warrior 2, chair pose x2, wide straddle with hands on chair, standing back ext with hands on sacrum and heels pulling together, warrior 2,   L UE ROM and strength         Tool assisted device left shoulder and upper arm, deeper tissue massage to bicep.                    Self-Care/Home Management  / Therapeutic Activities  15 minutes 15 minutes 15 minutes  15 minutes 15 minutes 15 min. 15 min.  15 minutes                Relaxation techniques DB,body scan,  DB,body scan, metta  DB,body scan, metta   DB,body scan, metta  DB,body scan, metta  DB,body scan, metta   Bramari breath, DB, body scan, Bramari breath, DB, body scan,   Restorative  Supine with bolster unde knees Bridge and bound angle with bolsters Bridge and bound angle with bolsters  Supine in crossed ankle with legs on 2 stacked bolsters Supine on treatment table with legs up wall using 2 bolsters, Supine on floor in bolster supported bridge and bound angle  Supine on mat with 2 bolsters under LE's. Fish with 2 blocks   Activity Pacing                                       Stress Management/Education  - physiology of yoga/meditation and immune health - physiology of yoga/meditation and immune health physiology of yoga/meditation and immune health  - physiology of yoga/meditation and immune health - physiology of yoga/meditation and immune health - physiology of yoga/meditation and immune health  physiology of yoga/meditation and immune health - physiology of yoga/meditation and immune health - physiology of yoga/meditation and immune health                    Patient Education and Home Exercises      Education provided:   - - physiology of yoga/meditation and immune health  - Progress towards goals     Written Home Exercises Provided: yes.  Exercises were reviewed and Lukasz was able to demonstrate them prior to the end of the session.  Lukasz demonstrated good  understanding of the HEP provided. See EMR under Patient Instructions for exercises provided during therapy sessions.       Assessment      In today's session, Lukasz practiced all of his HEP focusing  on strengthening, stretching and relaxation techniques.  We focused on strength and balance as well as abdominal engagement in all poses.   He required moderate verbal and neuromuscular cues.     Lukasz is progressing well towards his goals and patient prognosis is Good.    Progress Update: Lukasz is making good progress towards his goals.  His PSFS score increased from   8.0 to 8.2 indicating slightly increased functional ability.  He reports his balance and endurance are improving with yoga exercise. He is able to transfer from stand to floor without using a chair and 1 flight of stairs are not a challenge He continues to use prayer and breathing techniques to help with stress/immune health.   Patient will continue to benefit from skilled outpatient occupational therapy to address the deficits listed in the problem list on initial evaluation provide pt/family education and to maximize pt's level of independence in the home and community environment. Pt's spiritual, cultural and educational needs considered and pt agreeable to plan of care and goals.    Anticipated barriers to occupational therapy: none    Goals:  Short Term Goals: 6 weeks      Goal # Goal Status   1 Patient will demonstrate independence with diaphragmatic breathing in sit and supine. met   2 Pt. will identify resource for audio body scan to assist with stress management/immune health met   3 Patient will identify 2 new stress coping skills for stress management/immune health. met   4 Patient will identify activity pacing problems.  Patient will then implement new plan for daily activity to increase endurance for ADL's. Progressing      Long Term Goals: 12 weeks      Goal # Goal Status   1 Patient will demonstrate independence with yoga Home Exercise Program to increase strength and endurance for ADL's. Progressing   2 Patient will demonstrate independence with relaxation techniques to manage stress for immune health. Progressing   3 Patient will verbalize good understanding of stress/immune health  relationship.  Progressing   4            PLAN     Plan of care Certification: 3/19/2024 to 5/22/24.    Outpatient Occupational Therapy 1 times weekly for 14 weeks to include the following interventions: Neuromuscular Re-ed, Patient Education, Self Care, Therapeutic Activities, and Therapeutic Exercise.     Joanna Kimball, OT

## 2024-03-26 ENCOUNTER — CLINICAL SUPPORT (OUTPATIENT)
Dept: REHABILITATION | Facility: HOSPITAL | Age: 69
End: 2024-03-26
Payer: MEDICARE

## 2024-03-26 ENCOUNTER — HOSPITAL ENCOUNTER (OUTPATIENT)
Dept: RADIOLOGY | Facility: HOSPITAL | Age: 69
Discharge: HOME | End: 2024-03-26
Attending: STUDENT IN AN ORGANIZED HEALTH CARE EDUCATION/TRAINING PROGRAM
Payer: MEDICARE

## 2024-03-26 DIAGNOSIS — C15.9 ESOPHAGEAL ADENOCARCINOMA: ICD-10-CM

## 2024-03-26 DIAGNOSIS — R53.81 PHYSICAL DECONDITIONING: Primary | ICD-10-CM

## 2024-03-26 DIAGNOSIS — F43.29 STRESS AND ADJUSTMENT REACTION: ICD-10-CM

## 2024-03-26 PROCEDURE — A9698 NON-RAD CONTRAST MATERIALNOC: HCPCS | Performed by: STUDENT IN AN ORGANIZED HEALTH CARE EDUCATION/TRAINING PROGRAM

## 2024-03-26 PROCEDURE — 25500020 PHARM REV CODE 255: Performed by: STUDENT IN AN ORGANIZED HEALTH CARE EDUCATION/TRAINING PROGRAM

## 2024-03-26 PROCEDURE — 74220 X-RAY XM ESOPHAGUS 1CNTRST: CPT | Mod: 26,,, | Performed by: RADIOLOGY

## 2024-03-26 PROCEDURE — 74220 X-RAY XM ESOPHAGUS 1CNTRST: CPT | Mod: TC

## 2024-03-26 PROCEDURE — 97535 SELF CARE MNGMENT TRAINING: CPT

## 2024-03-26 PROCEDURE — 97112 NEUROMUSCULAR REEDUCATION: CPT

## 2024-03-26 PROCEDURE — 97110 THERAPEUTIC EXERCISES: CPT

## 2024-03-26 RX ADMIN — BARIUM SULFATE 150 ML: 0.6 SUSPENSION ORAL at 09:03

## 2024-03-26 NOTE — PROGRESS NOTES
[facility]  Occupational Therapy Progress and Treatment Note - Therapeutic Yoga Progam    Date: 3/19/2024  Name: Lukasz Person  Clinic Number: [id number]    Therapy Diagnosis:       Physician: Betina Valdovinos, *    Physician Orders: Eval and Treat   Medical Diagnosis from Referral: Chronic low back pain [M54.50, G89.29], Poor posture [R29.3], Esophageal adenocarcinoma [C15.9]  Evaluation Date: 8/22/2023  Authorization Period Expiration: 1/31/24  Plan of Care Expiration: 05/22/24  Progress Note Due: 4/12/24  Visit # / Visits authorized: 17/20     Time in:  9:45 am  Time Out:   10:10:45 am  Total Billable Time: 60 minutes    SUBJECTIVE     Pt reports: I just had a barium swallow test.  He was compliant with home exercise program given last session.   Response to previous treatment: improved balance.  Functional change: improved balance and comfort with anything physical.    Pain:  5/10 in left shoulder on arrival and 0/10 at end of session      OBJECTIVE     Objective Measures updated at progress report unless specified.    Patient Specific Functional Scale:           Activity 8/22/23  11/1/23 12/6/23   1/5/24 2/6/24  3/12/24    Poor balance 5    5  6      7     8     8   2.  Carrying 20 pounds or more 7     6  7       7      8      10   3.  Challenge with stairs 5     6  6      7     8      8   4.  Endurance  4      6  7      7      8      8   5.                6.              SCORE 5.25    5.75    6.5     7.0    8.0      8.2      Total Score = Sum of activity scores / number of activities  Minimum Detectable Change (90% CII) for average score = 2 points  Minimum Detectable Change (90% CI) for single activity score = 3 points       Treatment     Lukasz received the treatments listed below:       Date 1/5/24 1/9/24 1/23/24 2/6/23 2/14/23 2/20/24 2/27/24 3/5/24 3/12/24 3/19/24 3/26/24   Therapeutic Yoga Exercises / Neuromuscular Re-education  30 minutes  PN 30 minutes 30 minutes 30  minutes 30 minutes 30 minutes 30 min. 30 minutes 30 minutes  PN 45 minutes 30 minutes   Seated Yoga   chair yoga:  Cat-Cow,forward bend, back bend, twist, figure 4 chair yoga:  Cat-Cow,forward bend, back bend, twist, figure 4  chair yoga:  Cat-Cow,forward bend, back bend, twist,  chair yoga:  Cat-Cow,forward bend, back bend, twist, figure 4, side body stretch,   On bolster: hamstring stretch with strap,  backbends, bound angle, cross legged, chair yoga:  Cat-Cow,forward bend, back bend, twist,  Seated hamstring stretch on chair, chair yoga:  Cat-Cow,forward bend, back bend, twist,  chair yoga:  Cat-Cow,forward bend, back bend, twist, neck ROM sequence,  Sitting in chair with back:  hamstring stretch with sheet, back bend, meditation using sweeping awareness from pelvic floor to top of head,   Quadruped    Cat-cow   Bird dog x 3,cat cow x 6,       Supine Hamstring release with belt,Modified boat pose with block 3 x 5, Twist x 2,( eagle and wide feet),  knees to chest, happy baby flow, knee to chest flow, figure 4,  amstring release with belt,Modified boat pose with block 3 x 5, Twist x 2,( eagle and wide feet),  knees to chest, happy baby flow, knee to chest flow, figure 4,  hamstring release with belt,Modified boat pose with block and head lifted, 3 x 5, Twist x 2,( eagle and wide feet),  knees to chest, happy baby flow, knee to chest flow, figure 4, leg lifts, bridge x 2, hamstring release with belt,Modified boat pose with block and head lifted, 3 x 5, Twist x 2,( eagle and wide feet),  knees to chest, happy baby flow, knee to chest flow, figure 4, leg lifts, bridge x 2 hamstring release with belt,Modified boat pose with block and head lifted, 3 x 5, Twist x 2,( eagle and wide feet),  knees to chest, happy baby flow, knee to chest flow, figure 4, leg lifts, bridge x 2, bound angle x 5 breaths,           hamstring release with sheet, Modified boat pose with block and head lifted, 2 x 5, Twist x 2,( eagle and wide  feet),  knees to chest, happy baby flow, knee to chest flow,  On bolster:  Modified boat pose with block and head lifted, 2 x 5, Twist x 2,( eagle and wide feet),  knees to chest, happy baby flow, knee to chest flow,  Modified boat pose with block and head lifted, 3 x 5, Twist x 2, knees to chest, happy baby flow, knee to chest flow Modified boat pose with block and head lifted, 3 x 5, Twist x 2, happy baby flow, knee to chest flow, bridge x 2, Modified boat pose with block and head lifted, 3 x 5, Twist x 2, happy baby flow, knee to chest flow, bridge x 2, hamstring stretch with sheet,     Prone   sphinx sphinx Sphinx x 2 Sphinx/ sphinx plank x 3 Sphinx/ sphinx plank x 3  Sphinx/ sphinx plank x 3     standing Warrior 2, chair pose/volcano x3, wide straddle with hands on chair, standing back ext with hands on sacrum and heels pulling together,  chair pose/volcano x3, wide straddle with hands on chair, standing back ext with hands on sacrum and heels pulling together, transfer floor to stand with supervision and chair, hair pose/volcano x3, wide straddle with hands on chair, standing back ext with hands on sacrum and heels pulling together, transfer floor to stand without chair, chair pose/volcano x2, wide straddle with hands on chair, standing back ext with hands on sacrum and heels pulling together,  chair pose/volcano x2, wide straddle with hands on chair, standing back ext with hands on sacrum and heels pulling together, warrior 2, side angle with chair,  chair pose/volcano x2, wide straddle with hands on chair, standing back ext with hands on sacrum and heels pulling together, warrior 2, chair pose/volcano x2, wide straddle with hands on chair, standing back ext with hands on sacrum and heels pulling together, warrior 2, chair pose x2, wide straddle with hands on chair, standing back ext with hands on sacrum and heels pulling together, warrior 2, chair pose x2, wide straddle with hands on chair, standing back ext  with hands on sacrum and heels pulling together, warrior 2, chair pose x2, wide straddle with hands on chair, standing back ext with hands on sacrum and heels pulling together, warrior 2, back ext with hands on sacrum and heels pulling together, warrior 2, triangle with chair, tree with knee at wall and without, bilateral shoulder AAROM using wall,    L UE ROM and strength        Tool assisted device left shoulder and upper arm, deeper tissue massage to bicep.                      Self-Care/Home Management  / Therapeutic Activities  15 minutes 15 minutes 15 minutes  15 minutes 15 minutes 15 min. 15 min.  15 minutes 30 minutes                 Relaxation techniques DB,body scan,  DB,body scan, metta  DB,body scan, metta   DB,body scan, metta  DB,body scan, metta  DB,body scan, metta   Bramari breath, DB, body scan, Bramari breath, DB, body scan, meditation using sweeping awareness from pelvic floor to top of head, Bramari breath, DB, body scan,    Restorative  Supine with bolster unde knees Bridge and bound angle with bolsters Bridge and bound angle with bolsters  Supine in crossed ankle with legs on 2 stacked bolsters Supine on treatment table with legs up wall using 2 bolsters, Supine on floor in bolster supported bridge and bound angle  Supine on mat with 2 bolsters under LE's. Fish with 2 blocks    Activity Pacing                                          Stress Management/Education  - physiology of yoga/meditation and immune health - physiology of yoga/meditation and immune health physiology of yoga/meditation and immune health  - physiology of yoga/meditation and immune health - physiology of yoga/meditation and immune health - physiology of yoga/meditation and immune health  physiology of yoga/meditation and immune health - physiology of yoga/meditation and immune health - physiology of yoga/meditation and immune health - physiology of yoga/meditation and immune health                     Patient Education and  Home Exercises      Education provided:   - - physiology of yoga/meditation and immune health  - Progress towards goals     Written Home Exercises Provided: yes.  Exercises were reviewed and Lukasz was able to demonstrate them prior to the end of the session.  Lukasz demonstrated good  understanding of the HEP provided. See EMR under Patient Instructions for exercises provided during therapy sessions.       Assessment      In today's session, Lukasz practiced we modified his HEP due to barium swallow test right before OT.  We focusing  on strengthening, stretching and relaxation techniques in sit and stand only.  He was taught new standing balance pose at wall and without wall.  He was taught new relaxation techniques including Samha mudra for neck and throat relaxation and ROM.   He required moderate verbal and neuromuscular cues.     Lukasz is progressing well towards his goals and patient prognosis is Good.    Progress Update: Lukasz is making good progress towards his goals.  His PSFS score increased from   8.0 to 8.2 indicating slightly increased functional ability.  He reports his balance and endurance are improving with yoga exercise. He is able to transfer from stand to floor without using a chair and 1 flight of stairs are not a challenge He continues to use prayer and breathing techniques to help with stress/immune health.   Patient will continue to benefit from skilled outpatient occupational therapy to address the deficits listed in the problem list on initial evaluation provide pt/family education and to maximize pt's level of independence in the home and community environment. Pt's spiritual, cultural and educational needs considered and pt agreeable to plan of care and goals.    Anticipated barriers to occupational therapy: none    Goals:  Short Term Goals: 6 weeks      Goal # Goal Status   1 Patient will demonstrate independence with diaphragmatic breathing in sit and supine. met   2 Pt. will  identify resource for audio body scan to assist with stress management/immune health met   3 Patient will identify 2 new stress coping skills for stress management/immune health. met   4 Patient will identify activity pacing problems.  Patient will then implement new plan for daily activity to increase endurance for ADL's. Progressing      Long Term Goals: 12 weeks      Goal # Goal Status   1 Patient will demonstrate independence with yoga Home Exercise Program to increase strength and endurance for ADL's. Progressing   2 Patient will demonstrate independence with relaxation techniques to manage stress for immune health. Progressing   3 Patient will verbalize good understanding of stress/immune health relationship.  Progressing   4            PLAN     Plan of care Certification: 3/19/2024 to 5/22/24.    Outpatient Occupational Therapy 1 times weekly for 14 weeks to include the following interventions: Neuromuscular Re-ed, Patient Education, Self Care, Therapeutic Activities, and Therapeutic Exercise.     Joanna Kimball OT

## 2024-03-28 ENCOUNTER — CLINICAL SUPPORT (OUTPATIENT)
Dept: REHABILITATION | Facility: HOSPITAL | Age: 69
End: 2024-03-28
Payer: MEDICARE

## 2024-03-28 DIAGNOSIS — M25.512 LEFT SHOULDER PAIN, UNSPECIFIED CHRONICITY: Primary | ICD-10-CM

## 2024-03-28 PROCEDURE — 97110 THERAPEUTIC EXERCISES: CPT

## 2024-03-28 NOTE — PROGRESS NOTES
[facility]  Occupational Therapy Treatment Note     Date: 3/28/2024  Name: Lukasz Person  Clinic Number: [id number]      Therapy Diagnosis: left shoulder pain       Physician: Kirk Wilson MD    Physician Orders:  eval and treat   Medical Diagnosis: S46.012A (ICD-10-CM) - Traumatic tear of left rotator cuff, unspecified tear extent, initial encounter   Surgical Procedure/ Date :reports no shoulder sx   Evaluation Date: 1/25/2024  Insurance:Medicare   Insurance Authorization period Expiration: 1/21/25   Plan of Care Certification Period: 4/30/24      Visit # / Visits Authortized: 6 / 20   Time In:9:00 am   Time Out:10;00 am   Total Billable Time: 60 minutes       Precautions: Standard, needs to have head elevated in supine due to esophageal cancer and reconstruction     Subjective     Patient reports: mild superior and anterior  pain . Over AC joint   He was compliant with home exercise program given last session.   Response to previous treatment: first tx  Functional change: none to be noted to this date     Pain: 0/10  Location: left shoulder      Objective     Objective Measures updated at progress report unless specified.    Active Range of Motion: Measured in degrees     Shoulder Right Left  Left 2/29/24 3/28/24 left     Flexion 155 130 160  160   Abduction 130 130 150 155   ER at 0 80 60 60 60   ER at 90 60  60  60 60   IR 60 60 T 12  T 12          Shoulder Right   Shoulder flexion: 4/5   Shoulder Abduction: 4/5   Shoulder ER 4/5   Shoulder IR 4/5   Lower Trap 5/5   Middle Trap 5/5   Rhomboids 5/5         Scapular Control/Dyskinesis:     Normal / Subtle / Obvious  Comments    Left  Protracted      Right  Protracted       He reports that he does sleep         Palpation Shoulder:     Lateral shoulder pain                CMS Impairment/Limitation/Restriction for FOTO initial  Survey     Therapist reviewed FOTO scores for Lukasz Person on 1/25/2024.   FOTO documents entered into EPIC - see  Media section.      17 intake score           Treatment     Lukasz received the treatments listed below:     Hot pack to left shoulder X 10 minutes      Manual  therapy X 10 minutes Shoulder approximation with isometrics       therapeutic exercises to develop strength for 38  minutes including:  -  in supine 1# DB flexion, scaption and  sidelying abduction, scaption, ER x 10 reps (3 sets)   Supine chest press ( serratus push )  1-3# weight 20 reps ( added to HEP)  -   Red t band alcon  ( rows) , Er walk outs , IR  full motion   and shoulder adduction  20 reps ( added to HEP )     Brueggers red band   And shoulder stabilization with alphabet  on wall              Patient Education and Home Exercises     Education provided:   - added table slides with HEP   - Progress towards goals     Written Home Exercises Provided: Patient instructed to cont prior HEP.  Exercises were reviewed and Lukasz was able to demonstrate them prior to the end of the session.  Lukasz demonstrated good  understanding of the home exercise program provided. See electronic medical record under Patient Instructions for exercises provided during therapy sessions.       Assessment     Good debra today. Good ROM slight pain  ,  Pt reporting increased improvement only slight anterior shoulder pain    Lukasz is progressing well towards his goals and there are no updates to goals at this time. Pt prognosis is Good.     Patient will continue to benefit from skilled outpatient occupational therapy to address the deficits listed in the problem list on initial evaluation provide patient/family education and to maximize patient's level of independence in the home and community environment.     Patient's spiritual, cultural and educational needs considered and patient agreeable to plan of care and goals.      Anticipated barriers to occupational therapy:     Goals:  Short Term (4  weeks on 2/29/24 ):  1)   Patient to be IND with HEP and modalities for  pain management  2)   Increase ROM 15 degrees in left shoulder motion to increase functional UE use for  shoulder flexion, scaption and ER   3)  Improve muscle strength ing 4/+5 by shoulder motions  in order to carry items with left shoulder .      Long Term (by discharge):  1)   Pt will report 0 out of 10 pain with left shoulder . Reaching forward or overhead into closets .  2)   Patient to increase score by  10 on FOTO to demonstrate improved perception of functional left use.  3)   Pt will return to prior level of function for ADLs and household management.     Plan     Updates/Grading for next session:     Sudha Murillo, OT   3/28/2024

## 2024-04-01 ENCOUNTER — INFUSION (OUTPATIENT)
Dept: INFUSION THERAPY | Facility: HOSPITAL | Age: 69
End: 2024-04-01
Payer: MEDICARE

## 2024-04-01 ENCOUNTER — RESEARCH ENCOUNTER (OUTPATIENT)
Dept: RESEARCH | Facility: HOSPITAL | Age: 69
End: 2024-04-01
Payer: MEDICARE

## 2024-04-01 ENCOUNTER — ANTI-COAG VISIT (OUTPATIENT)
Dept: CARDIOLOGY | Facility: CLINIC | Age: 69
End: 2024-04-01
Payer: MEDICARE

## 2024-04-01 ENCOUNTER — OFFICE VISIT (OUTPATIENT)
Dept: HEMATOLOGY/ONCOLOGY | Facility: CLINIC | Age: 69
End: 2024-04-01
Payer: MEDICARE

## 2024-04-01 VITALS
HEIGHT: 72 IN | OXYGEN SATURATION: 98 % | WEIGHT: 195.69 LBS | TEMPERATURE: 98 F | BODY MASS INDEX: 26.5 KG/M2 | HEART RATE: 49 BPM | DIASTOLIC BLOOD PRESSURE: 62 MMHG | SYSTOLIC BLOOD PRESSURE: 135 MMHG

## 2024-04-01 VITALS
HEART RATE: 60 BPM | DIASTOLIC BLOOD PRESSURE: 60 MMHG | TEMPERATURE: 98 F | BODY MASS INDEX: 26.5 KG/M2 | SYSTOLIC BLOOD PRESSURE: 124 MMHG | WEIGHT: 195.69 LBS | HEIGHT: 72 IN | RESPIRATION RATE: 18 BRPM | OXYGEN SATURATION: 98 %

## 2024-04-01 DIAGNOSIS — E11.69 HYPERLIPIDEMIA ASSOCIATED WITH TYPE 2 DIABETES MELLITUS: ICD-10-CM

## 2024-04-01 DIAGNOSIS — J38.00 VOCAL CORD PARALYSIS: ICD-10-CM

## 2024-04-01 DIAGNOSIS — I48.0 PAROXYSMAL ATRIAL FIBRILLATION: ICD-10-CM

## 2024-04-01 DIAGNOSIS — C15.9 ESOPHAGEAL ADENOCARCINOMA: Primary | ICD-10-CM

## 2024-04-01 DIAGNOSIS — Z79.01 LONG TERM (CURRENT) USE OF ANTICOAGULANTS: Primary | ICD-10-CM

## 2024-04-01 DIAGNOSIS — R13.19 ESOPHAGEAL DYSPHAGIA: ICD-10-CM

## 2024-04-01 DIAGNOSIS — E11.22 TYPE 2 DIABETES MELLITUS WITH STAGE 3 CHRONIC KIDNEY DISEASE, WITHOUT LONG-TERM CURRENT USE OF INSULIN, UNSPECIFIED WHETHER STAGE 3A OR 3B CKD: ICD-10-CM

## 2024-04-01 DIAGNOSIS — Z79.899 ON ANTINEOPLASTIC CHEMOTHERAPY: ICD-10-CM

## 2024-04-01 DIAGNOSIS — I15.2 HYPERTENSION ASSOCIATED WITH DIABETES: ICD-10-CM

## 2024-04-01 DIAGNOSIS — E78.5 HYPERLIPIDEMIA ASSOCIATED WITH TYPE 2 DIABETES MELLITUS: ICD-10-CM

## 2024-04-01 DIAGNOSIS — Z00.6 CLINICAL TRIAL PARTICIPANT: ICD-10-CM

## 2024-04-01 DIAGNOSIS — I50.20 HFREF (HEART FAILURE WITH REDUCED EJECTION FRACTION): ICD-10-CM

## 2024-04-01 DIAGNOSIS — E11.59 HYPERTENSION ASSOCIATED WITH DIABETES: ICD-10-CM

## 2024-04-01 DIAGNOSIS — I48.0 PAROXYSMAL ATRIAL FIBRILLATION: Chronic | ICD-10-CM

## 2024-04-01 DIAGNOSIS — I25.10 CORONARY ARTERY DISEASE INVOLVING NATIVE CORONARY ARTERY OF NATIVE HEART WITHOUT ANGINA PECTORIS: ICD-10-CM

## 2024-04-01 DIAGNOSIS — N18.30 TYPE 2 DIABETES MELLITUS WITH STAGE 3 CHRONIC KIDNEY DISEASE, WITHOUT LONG-TERM CURRENT USE OF INSULIN, UNSPECIFIED WHETHER STAGE 3A OR 3B CKD: ICD-10-CM

## 2024-04-01 DIAGNOSIS — R21 RASH: ICD-10-CM

## 2024-04-01 PROCEDURE — A4216 STERILE WATER/SALINE, 10 ML: HCPCS | Performed by: REGISTERED NURSE

## 2024-04-01 PROCEDURE — 99215 OFFICE O/P EST HI 40 MIN: CPT | Mod: Q1,S$PBB,, | Performed by: REGISTERED NURSE

## 2024-04-01 PROCEDURE — 25000003 PHARM REV CODE 250: Performed by: REGISTERED NURSE

## 2024-04-01 PROCEDURE — 96413 CHEMO IV INFUSION 1 HR: CPT

## 2024-04-01 PROCEDURE — 99214 OFFICE O/P EST MOD 30 MIN: CPT | Mod: PBBFAC | Performed by: REGISTERED NURSE

## 2024-04-01 PROCEDURE — 93793 ANTICOAG MGMT PT WARFARIN: CPT | Mod: ,,,

## 2024-04-01 PROCEDURE — 99999 PR PBB SHADOW E&M-EST. PATIENT-LVL IV: CPT | Mod: PBBFAC,,, | Performed by: REGISTERED NURSE

## 2024-04-01 RX ORDER — HEPARIN 100 UNIT/ML
500 SYRINGE INTRAVENOUS
Status: CANCELLED | OUTPATIENT
Start: 2024-04-01

## 2024-04-01 RX ORDER — SODIUM CHLORIDE 0.9 % (FLUSH) 0.9 %
10 SYRINGE (ML) INJECTION
Status: CANCELLED | OUTPATIENT
Start: 2024-04-01

## 2024-04-01 RX ORDER — EPINEPHRINE 0.3 MG/.3ML
0.3 INJECTION SUBCUTANEOUS ONCE AS NEEDED
Status: CANCELLED | OUTPATIENT
Start: 2024-04-01

## 2024-04-01 RX ORDER — DIPHENHYDRAMINE HYDROCHLORIDE 50 MG/ML
50 INJECTION INTRAMUSCULAR; INTRAVENOUS ONCE AS NEEDED
Status: CANCELLED | OUTPATIENT
Start: 2024-04-01

## 2024-04-01 RX ORDER — DIPHENHYDRAMINE HYDROCHLORIDE 50 MG/ML
50 INJECTION INTRAMUSCULAR; INTRAVENOUS ONCE AS NEEDED
Status: DISCONTINUED | OUTPATIENT
Start: 2024-04-01 | End: 2024-04-01 | Stop reason: HOSPADM

## 2024-04-01 RX ORDER — EPINEPHRINE 0.3 MG/.3ML
0.3 INJECTION SUBCUTANEOUS ONCE AS NEEDED
Status: DISCONTINUED | OUTPATIENT
Start: 2024-04-01 | End: 2024-04-01 | Stop reason: HOSPADM

## 2024-04-01 RX ORDER — SODIUM CHLORIDE 0.9 % (FLUSH) 0.9 %
10 SYRINGE (ML) INJECTION
Status: DISCONTINUED | OUTPATIENT
Start: 2024-04-01 | End: 2024-04-01 | Stop reason: HOSPADM

## 2024-04-01 RX ADMIN — Medication 10 ML: at 04:04

## 2024-04-01 NOTE — PLAN OF CARE
Problem: Fatigue  Goal: Improved Activity Tolerance  Outcome: Ongoing, Progressing  Intervention: Promote Improved Energy  Flowsheets (Taken 4/1/2024 1500)  Fatigue Management:   activity schedule adjusted   activity assistance provided   fatigue-related activity identified   frequent rest breaks encouraged   paced activity encouraged  Sleep/Rest Enhancement:   awakenings minimized   consistent schedule promoted   natural light exposure provided   relaxation techniques promoted   regular sleep/rest pattern promoted   reading promoted   noise level reduced  Activity Management:   Ambulated -L4   Up in chair - L3  /60 (Patient Position: Sitting)   Pulse 60   Temp 98.2 °F (36.8 °C)   Resp 18   Ht 6' (1.829 m)   Wt 88.7 kg (195 lb 10.5 oz)   SpO2 98%   BMI 26.54 kg/m² Pleasant, alert and oriented patient to Chemo Infusion per self for final treatment INV Opdivo - VSS and RCW Port accessed with blood return observed, flushed with NS, dressing applied, and patient tolerated procedure well - patient tolerated treatment with no AVE's, port flushed with NS/Heparin, blood return observed, port de-accessed, band-aide applied, and patient discharged to home with no concerns

## 2024-04-01 NOTE — PROGRESS NOTES
": URIEL Manriquez MD  Treating Investigators: NIRAV Medrano MD  Surgical Oncologist: SARAH Brown M.D. 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 Patient queried & verbalized his willingness to continue his participation on the above-mentioned trial. Patient queried & reports ongoing mild fatigue, persistent rash with pruritus and hoarseness. Patient states his dysphagia has gotten worse in the last ~ 6 weeks with regurgitation with almost every meal. He had an esophagram performed on 26Mar2023 that reported, "Apparent area of luminal narrowing in the expected location of the pylorus allowing mild intermittent passage of contrast, concerning for pyloric stricture." Patient will be scheduled for an ERCP for a stent placement within the coming month. Patient queried & denies any other changes to his health or ConMeds.    Patient completed safety labs, VS, PE & ECOG (ECOG = 0, per Bernadette Patterson NP) today per protocol. Bernadette assessed all patient's labs, VS & PE findings as either WNL or NCS, deeming patient eligible to continue treatment with Nivolumab monotherapy today.    Weight:  Step 1 Week 1 was 117.2 kg  Step 2 C1D1 was 99.2 kg   ( +/- 10% = 89.3 - 109.2 kg).   Today's weight is 88.7 kg   >20% weight loss since Step 1 Week 1 --> >10% - < 20%change from Step 2 Cycle 1.      Per protocol, Nivolumab is administered at a 480 mg flat dose & cannot be modified. CRC & Bernadette performed time-out in exam room.     Infusion RN Margi Dawn was instructed to administer the treatment " per the treatment plan with full sets of vital signs pre- and post-dose. RN expressed their understanding of all instructions. Patient completed treatment today without event & was DC'd from clinic ambulatory and in stable condition. Please see Infusion RN note for further information.  Infusion RN did not collect any vitals signs at this visit. RN was recounselled on above instructions & verbalized her understanding.     Patient was encouraged to contact CRC or Dr. Medrano's team with any questions or concerns & was reminded of clinic's 24/7 emergency contact number. Patient verbalized understanding & denies any additional questions at this time.      Follow-up CT C/A/P:  [id number] @ 1300    Step 2 -- 30 Day FU - [id number]:  Labs @ 0820 -- [facility] 3rd floor  Marcela @ 0930 -- 2nd floor        Solicited AEs -- Assessed [id number]:  ** Anemia - Grade 1 -- 65Izp7640 - ongoing -- (NCS per MD)  Platelet count decreased - Grade 0   ** White blood cell count decreased - Grade 1 - 27Zsc5331 - ongoing (NCS per MD) -- RESTARTED 27CSK1615  Neutrophil count decreased - Grade 0  ** Lymphocyte count decreased - Grade 3 -- 65Tgs7384 - ongoing (NCS per MD) -- RESTARTED 82BJC4140  Peripheral motor neuropathy - Grade 0   Peripheral sensory neuropathy - Grade 0   Creatinine increased - Grade 0  Hypothyroidism - Grade 0   (TSH WNL on 16Oct2023)  Diarrhea (patient baseline BM = 2 stools a day) - Grade 0 -- reports loose stools vs. diarrhea   **Fatigue - Grade 1 -- 49Oyz7963 - ongoing (NCS per MD)  **Nausea - Grade 1 - 33Mgn5988 - ongoing  (NCS per MD)  **Cough - Grade 1  -- Medical History  Pneumonitis - Grade 0  Hyperglycemia - Grade 0  Adrenal Insufficiency - Grade 0 -- 16Oct2023 ACTH & cortisol WNL  **Rash-maculo-papular - Grade 1 - 09Dcv6626 - ongoing ( used Sarna [camphor 0.5% - menthol 0.5&] PRN -- Prescribed triamcinolone 57Dji1482 ) -- (NCS per MD)  **Pruritis - Grade 1 - 24Krx1563 - ongoing -- (NCS per MD)  Erythema multiforme -  Grade 0  Vomiting - Grade 0    Lipase increased -- Not required per protocol and therefore not assessed this visit.      Ongoing Non-Solicited AEs:  Hoarseness - Grade 2 - 38Hoj8006 - ongoing ( no treatment )  Weight loss - Grade 2 - 88Biv4642 - ongoing ( no treatment )      New or Changed Non-Solicited AEs Since Last Visit:  Dysphagia - Grade 1 - 07Aux1045 - 14Wqe6620 ( no treatment )  Dysphagia - Grade 2 - 91Bfi4140 - ongoing ( no treatment ) -- Esophagram performed 26Mar2024, no treatment changes at this time         Complete Baseline Medical History, Adverse Events, and Concomitant Medications are located in patient's shadow chart

## 2024-04-01 NOTE — PROGRESS NOTES
[facility]    04/01/2024 2:09 PM    Lukasz Person (69 y.o.) is followed by the [facility].      Assessment/Plan:    Lukasz Person presents today with therapeutic INR. Goal INR 2.0-3.0    Assessment of patient findings per MA/LPN and chart review:   The following significant findings were found:  None    Recommendation for patient's warfarin regimen:   No change was made to warfarin therapy during this visit and patient has been instructed to continue their current warfarin regimen.    Recommended repeat INR in 3 weeks      Melissa Kinsey, PharmD, BCPS  Clinical Pharmacist - Coumadin Clinic  Preferred Contact: Secure Messaging or In Basket Message

## 2024-04-01 NOTE — PROGRESS NOTES
MEDICAL ONCOLOGY - ESTABLISHED PATIENT VISIT    Reason for visit: esophageal cancer    Best Contact Phone Number(s): [phone] (home)      Cancer/Stage/TNM:    Cancer Staging   Esophageal adenocarcinoma  Staging form: Esophagus - Adenocarcinoma, AJCC 8th Edition  - Pathologic stage from 3/28/2023: Stage II (ypT3, pN0, cM0, G2) - Signed by Santana Brown Jr., MD on 3/28/2023       Oncology History   Esophageal adenocarcinoma   12/8/2022 Initial Diagnosis    Esophageal adenocarcinoma     12/21/2022 - 12/21/2022 Chemotherapy    Treatment Summary   Plan Name: OP ESOPHAGEAL PACLITAXEL CARBOPLATIN WEEKLY  Treatment Goal: Curative  Status: Inactive  Start Date:   End Date:   Provider: Miki Medrano MD  Chemotherapy: CARBOplatin (PARAPLATIN) in sodium chloride 0.9% 250 mL chemo infusion, , Intravenous, Clinic/HOD 1 time, 0 of 1 cycle  PACLitaxeL (TAXOL) 50 mg/m2 = 120 mg in sodium chloride 0.9% 250 mL chemo infusion, 50 mg/m2, Intravenous, Clinic/HOD 1 time, 0 of 1 cycle     12/29/2022 - 1/20/2023 Chemotherapy    Treatment Summary   Plan Name: [facility] [id number] ARM B CARBOPLATIN PACLITAXEL NIVOLUMAB  Treatment Goal: Curative  Status: Inactive  Start Date: 12/29/2022  End Date: 1/20/2023  Provider: Miki Medrano MD  Chemotherapy: CARBOplatin (PARAPLATIN) 215 mg in sodium chloride 0.9% 306.5 mL chemo infusion, 215 mg, Intravenous, Clinic/HOD 1 time, 4 of 5 cycles  Administration: 215 mg (12/29/2022), 230 mg (1/5/2023), 265 mg (1/13/2023), 265 mg (1/20/2023)  PACLitaxeL (TAXOL) 50 mg/m2 = 120 mg in sodium chloride 0.9% 250 mL chemo infusion, 50 mg/m2 = 120 mg, Intravenous, Clinic/HOD 1 time, 4 of 5 cycles  Dose modification: 50 mg/m2 (original dose 50 mg/m2, Cycle 4)  Administration: 120 mg (12/29/2022), 120 mg (1/5/2023), 120 mg (1/13/2023), 120 mg (1/20/2023)     12/29/2022 - 2/1/2023 Radiation Therapy    Treating physician: Dr. Javier Moulton    Course: C1 CHEST 2022    Treatment Site Ref. ID Energy  Dose/Fx (Gy) #Fx Dose Correction (Gy) Total Dose (Gy) Start Date End Date Elapsed Days   IM Esophagus [id number] 6X 1.8 23 / 23 0 41.4 12/29/2022 2/1/2023 34        3/17/2023 Surgery    Procedure: Procedure(s) (LRB):  XI ROBOTIC ESOPHAGECTOMY (N/A)  ROBOTIC JEJUNOSTOMY TUBE INSERTION (N/A)      Surgeon(s) and Role:     * Santana Brown Jr., MD - Primary     * Darian Murphy MD - Assisting     Assistance: Due to having no qualified resident during critical portions of the case, Dr. Darian Murphy acted as an assistant.     Pre-Operative Diagnosis: Esophageal adenocarcinoma, distal 1/3     Post-Operative Diagnosis: Same     Pre-Operative Variables:  Stage: T3 N0  Adjacent organ involvement: None  Chemotherapy within 90 Days: Yes  Radiation Therapy within 90 Days: Yes     Comorbidities:  Morbid obesity  Type 2 diabetes mellitus  Obstructive sleep apnea  Gout  Hyperparathyroidism  Hypertension  Severe obesity  Coronary artery disease  Heart failure with reduced ejection fraction    Procedure:  Robotic-assisted Vj three field esophagectomy  Regional mediastinal lymph node dissection  Botox injection of the pylorus  Jejunostomy tube placement     Operative Findings:                No evidence of distant intraperitoneal metastases in the chest or abdomen  Esophageal tumor located in the distal third of the esophagus, mild radiation changes appreciated.  Resection status:  R0 pending final pathology.  No gross disease remaining post dissection.  Frozen sections on proximal and distal margins negative for malignancy  100u Botox injection into the pylorus  Stapled esophagogastrostomy performed at the neck incision after confirmation of negative margins.  Jejunostomy tube placed      3/28/2023 Cancer Staged    Staging form: Esophagus - Adenocarcinoma, AJCC 8th Edition  - Pathologic stage from 3/28/2023: Stage II (ypT3, pN0, cM0, G2)     5/1/2023 - 5/1/2023 Chemotherapy    Treatment Summary   Plan Name: [facility] [id number] ARM C  "ADJUVANT NIVOLUMAB  Treatment Goal: Curative  Status: Inactive  Start Date: 5/1/2023  End Date: 5/1/2023  Provider: Miki Medrano MD  Chemotherapy: [No matching medication found in this treatment plan]     5/29/2023 -  Chemotherapy    Treatment Summary   Plan Name: [facility] [id number] ARM C ADJUVANT NIVOLUMAB STEP 2  Treatment Goal: Curative  Status: Active  Start Date: 5/29/2023  End Date: 4/29/2024 (Planned)  Provider: Miki Medrano MD  Chemotherapy: [No matching medication found in this treatment plan]          Interim History:   Mr. Person returns to clinic today prior to final cycle 13 of adjuvant nivolumab. He underwent robotic esophagectomy on 3/14/23 with Dr. Brown and J tube insertion.     He is feeling well overall. Reports continued dysphagia with rare post-prandial vomiting. Most days has white "foamy" mucous production as well. No significant abdominal pain. Rash controlled with triamcinolone cream.     Presents to clinic alone. ECOG 0.       Review of Systems   Constitutional:  Negative for chills, diaphoresis, fever, malaise/fatigue and weight loss.   HENT:  Negative for sore throat.    Eyes:  Negative for blurred vision and double vision.   Respiratory:  Negative for cough and shortness of breath.    Cardiovascular:  Negative for chest pain, palpitations and leg swelling.   Gastrointestinal:  Negative for abdominal pain, blood in stool, constipation, diarrhea, heartburn, nausea and vomiting.        Dysphagia   Genitourinary:  Negative for dysuria, frequency, hematuria and urgency.   Musculoskeletal:  Negative for back pain, falls, myalgias and neck pain.   Skin:  Positive for itching and rash.   Neurological:  Negative for dizziness, tingling, weakness and headaches.   Psychiatric/Behavioral:  The patient is not nervous/anxious.      Past Medical History:   Past Medical History:   Diagnosis Date    Anticoagulant long-term use     Cancer     Diabetes mellitus     Onset late 50s/early 60s    " Hyperlipidemia     Hypertension     Onset late 50s/early 60s    Kidney stones     Sleep apnea     since 2006      Past Surgical History:   Past Surgical History:   Procedure Laterality Date    COLONOSCOPY N/A 10/26/2023    Procedure: COLONOSCOPY;  Surgeon: Noah Duong MD;  Location: [facility] (4TH FLR);  Service: Endoscopy;  Laterality: N/A;  instructions sent to patient portal. Tb  referral: Dr. Wilson  Pt. on Xarelto--tb-ok to hold see te 8/15/23 dr vu  10/13-precall complete-MS  10/19 pt rescheduled, Xarelto hold, PEG, portal -ml  10/24-precall complete-KPvt    CORONARY ANGIOGRAPHY N/A 9/30/2020    Procedure: ANGIOGRAM, CORONARY ARTERY;  Surgeon: John West MD;  Location: [facility] CATH LAB;  Service: Cardiology;  Laterality: N/A;    CYSTOSCOPY N/A 2/17/2022    Procedure: CYSTOSCOPY;  Surgeon: Lukasz Hughes MD;  Location: [facility] OR 1ST FLR;  Service: Urology;  Laterality: N/A;    CYSTOSCOPY W/ URETERAL STENT PLACEMENT N/A 2/25/2022    Procedure: CYSTOSCOPY, WITH URETERAL STENT INSERTION;  Surgeon: Lukasz Hughes MD;  Location: [facility] OR 1ST FLR;  Service: Urology;  Laterality: N/A;    ENDOSCOPIC ULTRASOUND OF UPPER GASTROINTESTINAL TRACT N/A 12/7/2022    Procedure: ULTRASOUND, UPPER GI TRACT, ENDOSCOPIC;  Surgeon: Darian Main MD;  Location: [facility];  Service: Endoscopy;  Laterality: N/A;  Approval to hold Xarelto rec'd from Dr. Vu (see t/e 12/5/22)-DS    ESOPHAGOGASTRODUODENOSCOPY N/A 11/17/2022    Procedure: EGD (ESOPHAGOGASTRODUODENOSCOPY);  Surgeon: Brody Gonzales MD;  Location: [facility] (2ND FLR);  Service: Endoscopy;  Laterality: N/A;  inst via email-ok to hold Xarelto x 2 days-MS    ESOPHAGOGASTRODUODENOSCOPY N/A 5/31/2023    Procedure: EGD (ESOPHAGOGASTRODUODENOSCOPY) WITH DILATION;  Surgeon: Santana Brown Jr., MD;  Location: [facility] OR 2ND FLR;  Service: General;  Laterality: N/A;    LASER LITHOTRIPSY Left 2/25/2022    Procedure: LITHOTRIPSY, USING LASER;  Surgeon:  Lukasz Hughes MD;  Location: [address];  Service: Urology;  Laterality: Left;    LEFT HEART CATHETERIZATION Left 9/30/2020    Procedure: Left heart cath;  Surgeon: John West MD;  Location: [facility] CATH LAB;  Service: Cardiology;  Laterality: Left;    LITHOTRIPSY      PARATHYROIDECTOMY  1/1/2-107    PYELOSCOPY Left 2/25/2022    Procedure: PYELOSCOPY;  Surgeon: Lukasz Hughes MD;  Location: [address];  Service: Urology;  Laterality: Left;    ROBOT-ASSISTED SURGICAL REMOVAL OF ESOPHAGUS USING DA CARLOS XI N/A 3/14/2023    Procedure: XI ROBOTIC ESOPHAGECTOMY;  Surgeon: Santana Brown Jr., MD;  Location: [address];  Service: General;  Laterality: N/A;  Abdomen, Chest and Neck    ROBOTIC JEJUNOSTOMY N/A 3/14/2023    Procedure: ROBOTIC JEJUNOSTOMY TUBE INSERTION;  Surgeon: Santana Brown Jr., MD;  Location: [address];  Service: General;  Laterality: N/A;    TRANSESOPHAGEAL ECHOCARDIOGRAPHY N/A 4/7/2022    Procedure: ECHOCARDIOGRAM, TRANSESOPHAGEAL;  Surgeon: Xander Diagnostic Provider;  Location: [facility] EP LAB;  Service: Cardiology;  Laterality: N/A;    TREATMENT OF CARDIAC ARRHYTHMIA N/A 4/7/2022    Procedure: Cardioversion or Defibrillation;  Surgeon: Iris Fisher NP;  Location: [facility] EP LAB;  Service: Cardiology;  Laterality: N/A;  afib, dccv, artie, anes, EH, 3prep    URETEROSCOPIC REMOVAL OF URETERIC CALCULUS Left 2/25/2022    Procedure: REMOVAL, CALCULUS, URETER, URETEROSCOPIC;  Surgeon: Lukasz Hughes MD;  Location: [address];  Service: Urology;  Laterality: Left;    URETEROSCOPY Left 2/25/2022    Procedure: URETEROSCOPY;  Surgeon: Lukasz Hughes MD;  Location: [address];  Service: Urology;  Laterality: Left;    VOCAL CORD INJECTION Left 3/17/2023    Procedure: INJECTION, VOCAL CORD, LARYNGOSCOPIC;  Surgeon: Gm Altman MD;  Location: [address];  Service: ENT;  Laterality: Left;      Family History:   Family History   Problem Relation Age of  Onset    Arthritis Mother     Heart disease Father 70        CABG    Arthritis Father     Diabetes Father     Kidney disease Father         had one kidney removed in early thirties    Arthritis Sister     Arthritis Sister     Hypertension Sister     Colon cancer Brother 32    Arthritis Brother     Alcohol abuse Paternal Aunt     Cancer Maternal Grandfather         throat cancer    Diabetes Paternal Grandmother     Colon polyps Paternal Grandfather     Colon cancer Paternal Grandfather     Cancer Paternal Grandfather         colon cancer at age 62      Social History:   Social History     Tobacco Use    Smoking status: Former     Current packs/day: 0.00     Average packs/day: 1 pack/day for 22.7 years (22.7 ttl pk-yrs)     Types: Cigarettes, Cigars     Start date: 1972     Quit date:      Years since quittin.9     Passive exposure: Never    Smokeless tobacco: Never    Tobacco comments:     smoking was off and on.  cumulative 7 years.  never more than three years in one stretch or more lj   Substance Use Topics    Alcohol use: Yes     Alcohol/week: 4.0 standard drinks of alcohol     Types: 4 Shots of liquor per week      I have reviewed and updated the patient's past medical, surgical, family and social histories.    Allergies:   Review of patient's allergies indicates:  No Known Allergies     Medications:   Current Outpatient Medications   Medication Sig Dispense Refill    allopurinoL (ZYLOPRIM) 100 MG tablet TAKE 1 TABLET BY MOUTH EVERY DAY 30 tablet 10    blood sugar diagnostic (ACCU-CHEK ANTONELLA PLUS TEST STRP) Strp Use to test CBG four times daily E11.65 400 strip 3    blood-glucose meter kit Checks blood sugars 1x/daily. 1 each 12    citric acid-potassium citrate (POLYCITRA) 1,100-334 mg/5 mL solution Take 10 mLs (20 mEq total) by mouth 2 (two) times a day. 600 mL 5    clotrimazole (LOTRIMIN) 1 % cream Apply topically once daily. for 14 days 45 g 0    hydroCHLOROthiazide (HYDRODIURIL) 25 MG tablet  Take 1 tablet (25 mg total) by mouth once daily. 30 tablet 11    lancets (ACCU-CHEK SOFTCLIX LANCETS) Misc USE 1 EVERY DAY OR AS DIRECTED 100 each 3    losartan (COZAAR) 25 MG tablet Take 1 tablet (25 mg total) by mouth 2 (two) times a day. 180 tablet 3    metoprolol succinate (TOPROL-XL) 25 MG 24 hr tablet Take 0.5 tablets (12.5 mg total) by mouth once daily. 15 tablet 11    nitroGLYCERIN (NITROSTAT) 0.4 MG SL tablet Place 1 tablet (0.4 mg total) under the tongue every 5 (five) minutes as needed for Chest pain. If you need a third tablet, call 911 100 tablet 3    omeprazole (PRILOSEC) 40 MG capsule TAKE 1 CAPSULE(40 MG) BY MOUTH EVERY DAY 90 capsule 3    rosuvastatin (CRESTOR) 20 MG tablet Take 1 tablet (20 mg total) by mouth every evening. 90 tablet 3    tamsulosin (FLOMAX) 0.4 mg Cap TAKE 1 CAPSULE(0.4 MG) BY MOUTH EVERY DAY 30 capsule 2    testosterone (ANDROGEL) 20.25 mg/1.25 gram (1.62 %) GlPm Apply 4 pumps to shoulders daily 2 each 5    triamcinolone acetonide 0.1% (KENALOG) 0.1 % cream Apply topically 2 (two) times daily. 45 g 2    warfarin (COUMADIN) 4 MG tablet Take 1 tablet (4 mg total) by mouth Daily. And as directed by Coumadin Clinic 45 tablet 6     No current facility-administered medications for this visit.      Physical Exam:   /62 (BP Location: Left arm, Patient Position: Sitting, BP Method: Medium (Automatic))   Pulse (!) 49   Temp 98.2 °F (36.8 °C) (Oral)   Ht 6' (1.829 m)   Wt 88.7 kg (195 lb 10.5 oz)   SpO2 98%   BMI 26.54 kg/m²      ECOG Performance status: 0    Physical Exam  Vitals reviewed.   Constitutional:       General: He is not in acute distress.     Appearance: Normal appearance. He is not ill-appearing, toxic-appearing or diaphoretic.   HENT:      Head: Normocephalic and atraumatic.      Right Ear: External ear normal.      Left Ear: External ear normal.      Nose: Nose normal. No congestion.      Mouth/Throat:      Pharynx: Oropharynx is clear.   Eyes:      General: No  scleral icterus.     Extraocular Movements: Extraocular movements intact.      Conjunctiva/sclera: Conjunctivae normal.      Pupils: Pupils are equal, round, and reactive to light.   Cardiovascular:      Rate and Rhythm: Normal rate and regular rhythm.   Pulmonary:      Effort: Pulmonary effort is normal. No respiratory distress.   Chest:      Comments: RCW port  Abdominal:      General: There is no distension.      Palpations: Abdomen is soft.      Tenderness: There is no abdominal tenderness.   Musculoskeletal:         General: No swelling.      Cervical back: Normal range of motion.   Lymphadenopathy:      Cervical: No cervical adenopathy.   Skin:     Coloration: Skin is not jaundiced.      Findings: Rash (grade I rash to upper chest/back.) present. No bruising or erythema.   Neurological:      General: No focal deficit present.      Mental Status: He is alert and oriented to person, place, and time. Mental status is at baseline.      Cranial Nerves: No cranial nerve deficit.      Motor: No weakness.      Gait: Gait normal.   Psychiatric:         Mood and Affect: Mood normal.         Behavior: Behavior normal.         Thought Content: Thought content normal.         Judgment: Judgment normal.         Labs:   Recent Results (from the past 48 hour(s))   Magnesium    Collection Time: 04/01/24 12:50 PM   Result Value Ref Range    Magnesium 1.8 1.6 - 2.6 mg/dL   PHOSPHORUS    Collection Time: 04/01/24 12:50 PM   Result Value Ref Range    Phosphorus 3.6 2.7 - 4.5 mg/dL   CORTISOL, RANDOM    Collection Time: 04/01/24 12:50 PM   Result Value Ref Range    Cortisol 7.40 ug/dL   CBC W/ AUTO DIFFERENTIAL    Collection Time: 04/01/24 12:50 PM   Result Value Ref Range    WBC 3.33 (L) 3.90 - 12.70 K/uL    RBC 4.34 (L) 4.60 - 6.20 M/uL    Hemoglobin 13.0 (L) 14.0 - 18.0 g/dL    Hematocrit 40.0 40.0 - 54.0 %    MCV 92 82 - 98 fL    MCH 30.0 27.0 - 31.0 pg    MCHC 32.5 32.0 - 36.0 g/dL    RDW 14.7 (H) 11.5 - 14.5 %    Platelets  117 (L) 150 - 450 K/uL    MPV 10.8 9.2 - 12.9 fL    Immature Granulocytes 0.3 0.0 - 0.5 %    Gran # (ANC) 2.3 1.8 - 7.7 K/uL    Immature Grans (Abs) 0.01 0.00 - 0.04 K/uL    Lymph # 0.4 (L) 1.0 - 4.8 K/uL    Mono # 0.5 0.3 - 1.0 K/uL    Eos # 0.1 0.0 - 0.5 K/uL    Baso # 0.03 0.00 - 0.20 K/uL    nRBC 0 0 /100 WBC    Gran % 68.8 38.0 - 73.0 %    Lymph % 12.6 (L) 18.0 - 48.0 %    Mono % 13.8 4.0 - 15.0 %    Eosinophil % 3.6 0.0 - 8.0 %    Basophil % 0.9 0.0 - 1.9 %    Differential Method Automated    COMPREHENSIVE METABOLIC PANEL    Collection Time: 04/01/24 12:50 PM   Result Value Ref Range    Sodium 141 136 - 145 mmol/L    Potassium 3.7 3.5 - 5.1 mmol/L    Chloride 107 95 - 110 mmol/L    CO2 26 23 - 29 mmol/L    Glucose 129 (H) 70 - 110 mg/dL    BUN 21 8 - 23 mg/dL    Creatinine 1.2 0.5 - 1.4 mg/dL    Calcium 9.0 8.7 - 10.5 mg/dL    Total Protein 6.6 6.0 - 8.4 g/dL    Albumin 3.9 3.5 - 5.2 g/dL    Total Bilirubin 0.7 0.1 - 1.0 mg/dL    Alkaline Phosphatase 91 55 - 135 U/L    AST 29 10 - 40 U/L    ALT 31 10 - 44 U/L    eGFR >60.0 >60 mL/min/1.73 m^2    Anion Gap 8 8 - 16 mmol/L   Protime-INR    Collection Time: 04/01/24 12:50 PM   Result Value Ref Range    Prothrombin Time 23.0 (H) 9.0 - 12.5 sec    INR 2.3 (H) 0.8 - 1.2   Hemoglobin A1C    Collection Time: 04/01/24 12:50 PM   Result Value Ref Range    Hemoglobin A1C 6.8 (H) 4.0 - 5.6 %    Estimated Avg Glucose 148 (H) 68 - 131 mg/dL   BILIRUBIN, DIRECT    Collection Time: 04/01/24 12:50 PM   Result Value Ref Range    Bilirubin, Direct 0.3 0.1 - 0.3 mg/dL   TSH    Collection Time: 04/01/24 12:50 PM   Result Value Ref Range    TSH 0.448 0.400 - 4.000 uIU/mL     Imaging:    3/26/24 - Esophagram  Impression:  Postoperative change of esophagectomy with gastric pull-through.  Apparent area of luminal narrowing in the expected location of the pylorus allowing mild intermittent passage of contrast, concerning for pyloric stricture.  Endoscopy may provide further  characterization if clinically warranted.     This report was flagged in Epic as abnormal.      10/13/23 - CT CAP:  Impression:  1. Postsurgical change of esophagectomy and gastric pull-through for esophageal adenocarcinoma.  No signs of recurrence or metastatic disease.  2. Additional findings as above.  RECIST SUMMARY:     Date of prior examination for comparison: 04/11/2023     No measurable lesions for RECIST criteria.    Path:   3/14/23:  Final Pathologic Diagnosis 1. LYMPH NODE, LEVEL 7, EXCISION:   One benign lymph node (0/1)   2. TOTAL THORACIC ESOPHAGECTOMY AND PROXIMAL STOMACH:   Adenocarcinoma, moderately differentiated, 3.0 cm   Margins are negative   Tumor invades adventitia   Extensive residual cancer with no evident tumor regression (poor or no   response, score 3)   Eleven benign lymph nodes (0/11)   3. RESIDUAL CONDUIT, EXCISION:   Negative for malignancy   SYNOPTIC REPORT   Procedure - Esophageal gastrectomy   Tumor site - GE Junction   Relationship of tumor to esophagogastric junction - Lesion is central at GE   junction   Tumor size - 3.0 x 3.1cm   Histologic type - Adenocarcinoma   Histologic grade - Moderately differentiated   Microscopic tumor extension - Tumor invades adventitia   Margins - All margins of resection are uninvolved, proximal, distal and   adventitial margins are negative   Treatment effect - Extensive residual cancer with no evident tumor regression   (poor or no response, score 3)   Lymphovascular invasion - Not identified   Pathologic staging - yp T3 N0 Mx   Lymph nodes examined - 12   Lymph nodes involved - 0   Additional pathologic findings - Intestinal metaplasia present   MMR-IHC has been performed on previous biopsy ([id number]) and shows all 4   antibodies intact      Assessment:       1. Esophageal adenocarcinoma    2. Clinical trial participant    3. Esophageal dysphagia    4. Vocal cord paralysis    5. Hypertension associated with diabetes    6. Hyperlipidemia  associated with type 2 diabetes mellitus    7. Type 2 diabetes mellitus with stage 3 chronic kidney disease, without long-term current use of insulin, unspecified whether stage 3a or 3b CKD    8. Coronary artery disease involving native coronary artery of native heart without angina pectoris    9. Paroxysmal atrial fibrillation    10. HFrEF (heart failure with reduced ejection fraction)    11. Rash        Plan:     # Esophageal adenocarcinoma   Mr. Person is a pleasant 67 year old male who presents to our clinic for management of esophageal cancer. He initially presented with dysphagia, and further workup confirmed a stage II adenocarcinoma, pMMR. Tumor staged T3N0Mx by endosonographic criteria, JEVON. CT CAP on 12/14/22 confirmed no evidence of metastatic disease.    Previously conversation regarding his diagnosis and treatment options.  Recommended perioperative chemo/radiation per the CROSS trial. Discussed chemoradiation for ~5 weeks with 5 doses of weekly carboplatin and taxol administered. Plan to obtain restaging scans 4-5 weeks after radiation completed.     He was a candidate for a cooperative group trial assessing perioperative immunotherapy treatment. He consented for this study - ECOG-ACRIN [id number]: A Phase II/III Study of Dilcia-operative Nivolumab and Ipilimumab in Patients with Locoregional Esophageal and Gastroesophageal Junction Adenocarcinoma    Previously met with Dr. Santana Brown who agreed he was a surgical candidate.  Previously met with Dr. Javier Moulton who will be treating him with radiation.    Began cycle 1 carboplatin/paclitaxel/nivolumab on trial on 12/29/22.  Received cycle 4 of weekly chemotherapy on trial on 1/20/23.   We held chemotherapy for week 5 because of thrombocytopenia per protocol.    Completed radiation on 2/1/23.    Restaging PET/CT personally reviewed on 3/1/23 shows interval decreased FDG avidity in esophageal tumor, no new sites of disease.  CT CAP 3/2/23 shows stable  esophageal thickening.    Underwent robotic esophagectomy on 3/14/23 with Dr. Brown.  Pathology showed negative margins, ypT3 N0 tumor with 0 of 12 lymph nodes involved.  No treatment effect was noted on the tumor tissue.    Discussed that per the [id number] protocol will proceed with randomization to adjuvant nivolumab +/- ipilimumab. Patient randomized to receive adjuvant Nivolumab, started cycle 1 at 480 mg q4 weeks 5/1/23. Plan for twelve months per protocol.    Tolerating very well. Grade 1 pruritis.    Repeat imaging after cycle 6 shows DAVE.    Proceed with final cycle 13 today at 480 mg q4 weeks.  Labs reviewed, adequate for treatment.   Timeout occurred with myself and Maria Esther SORTO. Orders signed.  CT C/A/P and esophagram ordered    Underwent dilation with Dr. Brown on 5/31/23 with temporary improvement in dysphagia. Weight stable.  Still having some dysphagia so esophogram ordered. Concern for pyloric stricture. Will refer back to Dr. Brown for repeat EGD/possible dilation.     PD-L1 CPS 30.    RTC in ~4 weeks per protocol.    # Vocal cord paralysis  Post-op. S/p injection by ENT.  Stable. Last evaluated 9/11/23 by ENT with improvement.    Esophagram ordered - concern for pyloric stricture. Will refer for EGD.     # HTN, HLD, DM, CKD  Following with PCP Dr. Wislon and nephrologist Dr. Oconnell.   BP stable in clinic today. Asymptomatic. Has been off his HCTZ/lisinopril because of prior hypotension.  Cr stable.    # CAD, A fib, CHF  Following with cardiologist Dr. Nick & EP Dr. Phillip.   Was on Xarelto. Now on warfarin. Being monitored by coumadin clinic. Labs monitored on regular basis.   Continue medical management.     # Rash  Grade I, very mild. Likely 2/2 immunotherapy.   Continue triamcinolone - symptoms controlled at this time.   Continue to monitor.     Follow up: per research.    Patient is in agreement with the proposed treatment plan. All questions were answered to the patient's satisfaction. Pt  knows to call clinic if anything is needed before the next clinic visit.    Patient discussed with collaborating physician, Dr. Medrano.    At least 40 minutes were spent today on this encounter including face to face time with the patient, data gathering/interpretation and documentation.       Bernadette Patterson, MSN, APRN, [facility]  Hematology and Medical Oncology  Clinical Nurse Specialist to Dr. Medrano, Dr. Smith & Dr. David    Route Chart for Scheduling    Med Onc Chart Routing      Follow up with physician . Per research   Follow up with SAMUEL    Infusion scheduling note    Injection scheduling note    Labs    Imaging    Pharmacy appointment    Other referrals              Treatment Plan Information   [facility] [id number] ARM C ADJUVANT NIVOLUMAB STEP 2   Miki Medrano MD   Upcoming Treatment Dates - [facility] [id number] ARM C ADJUVANT NIVOLUMAB STEP 2    4/1/2024       Chemotherapy       INV nivolumab 480 mg in INV sodium chloride 0.9 % 100 mL chemo infusion  4/29/2024       Chemotherapy       INV nivolumab 480 mg in INV sodium chloride 0.9 % 100 mL chemo infusion    Supportive Plan Information  IV FLUIDS AND ELECTROLYTES   Miki Medrano MD   Upcoming Treatment Dates - IV FLUIDS AND ELECTROLYTES    No upcoming days in selected categories.    Therapy Plan Information  Flushes  heparin, porcine (PF) 100 unit/mL injection flush 500 Units  500 Units, Intravenous, Every visit  sodium chloride 0.9% flush 10 mL  10 mL, Intravenous, Every visit

## 2024-04-02 RX ORDER — WARFARIN 4 MG/1
4-6 TABLET ORAL DAILY
Qty: 8 TABLET | Refills: 0 | Status: SHIPPED | OUTPATIENT
Start: 2024-04-02 | End: 2024-04-05 | Stop reason: DRUGHIGH

## 2024-04-02 RX ORDER — WARFARIN 4 MG/1
4-6 TABLET ORAL DAILY
Qty: 45 TABLET | Refills: 11 | Status: SHIPPED | OUTPATIENT
Start: 2024-04-02 | End: 2024-04-05 | Stop reason: DRUGHIGH

## 2024-04-02 NOTE — PROGRESS NOTES
4/02/24 Patient called to report he only has 1 (4 mg tablet ) left and due to take 6 mg tonight, tried to get a refill but pharmacy is telling him not due until 4/07, needs a new prescription for Warfarin 4 mg tablets with increased amount of tablets called in to [facility] pharmacy Ph # [phone], Patient wants call back when Rx has been called in, call back # [phone]

## 2024-04-04 ENCOUNTER — OFFICE VISIT (OUTPATIENT)
Dept: INTERNAL MEDICINE | Facility: CLINIC | Age: 69
End: 2024-04-04
Payer: MEDICARE

## 2024-04-04 ENCOUNTER — HOSPITAL ENCOUNTER (OUTPATIENT)
Dept: RADIOLOGY | Facility: HOSPITAL | Age: 69
Discharge: HOME | End: 2024-04-04
Attending: STUDENT IN AN ORGANIZED HEALTH CARE EDUCATION/TRAINING PROGRAM
Payer: MEDICARE

## 2024-04-04 VITALS
HEART RATE: 51 BPM | SYSTOLIC BLOOD PRESSURE: 116 MMHG | WEIGHT: 199.06 LBS | DIASTOLIC BLOOD PRESSURE: 80 MMHG | HEIGHT: 72 IN | TEMPERATURE: 99 F | OXYGEN SATURATION: 98 % | RESPIRATION RATE: 18 BRPM | BODY MASS INDEX: 26.96 KG/M2

## 2024-04-04 DIAGNOSIS — R05.9 COUGH: ICD-10-CM

## 2024-04-04 DIAGNOSIS — J06.9 UPPER RESPIRATORY TRACT INFECTION, UNSPECIFIED TYPE: ICD-10-CM

## 2024-04-04 DIAGNOSIS — J06.9 UPPER RESPIRATORY TRACT INFECTION, UNSPECIFIED TYPE: Primary | ICD-10-CM

## 2024-04-04 DIAGNOSIS — Z20.822 ENCOUNTER FOR LABORATORY TESTING FOR COVID-19 VIRUS: ICD-10-CM

## 2024-04-04 LAB
INFLUENZA A, MOLECULAR: NEGATIVE
INFLUENZA B, MOLECULAR: NEGATIVE
SARS-COV-2 RNA RESP QL NAA+PROBE: NOT DETECTED
SPECIMEN SOURCE: NORMAL

## 2024-04-04 PROCEDURE — 99214 OFFICE O/P EST MOD 30 MIN: CPT | Mod: S$PBB,,, | Performed by: STUDENT IN AN ORGANIZED HEALTH CARE EDUCATION/TRAINING PROGRAM

## 2024-04-04 PROCEDURE — 87635 SARS-COV-2 COVID-19 AMP PRB: CPT | Performed by: STUDENT IN AN ORGANIZED HEALTH CARE EDUCATION/TRAINING PROGRAM

## 2024-04-04 PROCEDURE — 99214 OFFICE O/P EST MOD 30 MIN: CPT | Mod: PBBFAC,PO | Performed by: STUDENT IN AN ORGANIZED HEALTH CARE EDUCATION/TRAINING PROGRAM

## 2024-04-04 PROCEDURE — 71046 X-RAY EXAM CHEST 2 VIEWS: CPT | Mod: 26,,, | Performed by: STUDENT IN AN ORGANIZED HEALTH CARE EDUCATION/TRAINING PROGRAM

## 2024-04-04 PROCEDURE — 71046 X-RAY EXAM CHEST 2 VIEWS: CPT | Mod: TC,PO

## 2024-04-04 PROCEDURE — 87502 INFLUENZA DNA AMP PROBE: CPT | Mod: PO | Performed by: STUDENT IN AN ORGANIZED HEALTH CARE EDUCATION/TRAINING PROGRAM

## 2024-04-04 PROCEDURE — 99999 PR PBB SHADOW E&M-EST. PATIENT-LVL IV: CPT | Mod: PBBFAC,,, | Performed by: STUDENT IN AN ORGANIZED HEALTH CARE EDUCATION/TRAINING PROGRAM

## 2024-04-04 RX ORDER — PREDNISONE 20 MG/1
20 TABLET ORAL 2 TIMES DAILY
Qty: 8 TABLET | Refills: 0 | Status: SHIPPED | OUTPATIENT
Start: 2024-04-04 | End: 2024-04-08

## 2024-04-04 RX ORDER — BENZONATATE 200 MG/1
200 CAPSULE ORAL 3 TIMES DAILY PRN
Qty: 60 CAPSULE | Refills: 1 | Status: SHIPPED | OUTPATIENT
Start: 2024-04-04 | End: 2024-04-14

## 2024-04-04 NOTE — PROGRESS NOTES
Subjective:      Chief Complaint: Cough (Off white mucus x 2 days) and Nasal Congestion (Chest congestion)    HPI  Mr.Michael Person is a 69-year-old man, new to me but established with a colleague with esophageal adenocarcinoma (Nivolumab via oncology) diabetes, hypertension, hyperlipidemia, and sleep apnea presenting via urgent care time slot for evaluation/treatment of nasal/chest congestion:    URI:  - nasal congestion x 2 days and mildly productive cough x 24 hours; no associated fever  - 2 sick contacts (grandchildren with similar symptoms)   - last dose of immunotherapy was 3 days ago    Review of Systems   Constitutional:  Negative for appetite change, chills and fever.   HENT:  Positive for nasal congestion and postnasal drip.    Respiratory:  Positive for cough. Negative for chest tightness and shortness of breath.    Cardiovascular:  Negative for chest pain, palpitations and leg swelling.   Gastrointestinal:  Negative for abdominal distention, abdominal pain, blood in stool, constipation, diarrhea, nausea and vomiting.   Endocrine: Negative.    Genitourinary:  Negative for difficulty urinating, dysuria, frequency and hematuria.   Musculoskeletal: Negative.    Integumentary:  Negative.   Neurological: Negative.    Psychiatric/Behavioral: Negative.           Objective:      Vitals:    04/04/24 0927   BP: 116/80   Pulse: (!) 51   Resp: 18   Temp: 98.6 °F (37 °C)      Physical Exam  Vitals reviewed.   Constitutional:       General: He is not in acute distress.     Appearance: Normal appearance.   HENT:      Head: Normocephalic and atraumatic.      Comments: Facial features are symmetric      Nose: Nose normal. No congestion or rhinorrhea.      Mouth/Throat:      Mouth: Mucous membranes are moist.      Pharynx: Oropharynx is clear. No oropharyngeal exudate or posterior oropharyngeal erythema.   Eyes:      General: No scleral icterus.     Extraocular Movements: Extraocular movements intact.       Conjunctiva/sclera: Conjunctivae normal.   Cardiovascular:      Rate and Rhythm: Normal rate and regular rhythm.      Pulses: Normal pulses.      Heart sounds: Normal heart sounds.   Pulmonary:      Effort: Pulmonary effort is normal. No respiratory distress.      Breath sounds: Normal breath sounds.   Musculoskeletal:         General: No deformity or signs of injury. Normal range of motion.      Cervical back: Normal range of motion.      Comments: Gait normal    Skin:     General: Skin is warm and dry.      Findings: No rash.   Neurological:      General: No focal deficit present.      Mental Status: He is alert and oriented to person, place, and time. Mental status is at baseline.   Psychiatric:         Mood and Affect: Mood normal.         Behavior: Behavior normal.         Thought Content: Thought content normal.       Current Outpatient Medications on File Prior to Visit   Medication Sig Dispense Refill    allopurinoL (ZYLOPRIM) 100 MG tablet TAKE 1 TABLET BY MOUTH EVERY DAY 30 tablet 10    blood sugar diagnostic (ACCU-CHEK ANTONELLA PLUS TEST STRP) Strp Use to test CBG four times daily E11.65 400 strip 3    blood-glucose meter kit Checks blood sugars 1x/daily. 1 each 12    citric acid-potassium citrate (POLYCITRA) 1,100-334 mg/5 mL solution Take 10 mLs (20 mEq total) by mouth 2 (two) times a day. 600 mL 5    hydroCHLOROthiazide (HYDRODIURIL) 25 MG tablet Take 1 tablet (25 mg total) by mouth once daily. 30 tablet 11    lancets (ACCU-CHEK SOFTCLIX LANCETS) Misc USE 1 EVERY DAY OR AS DIRECTED 100 each 3    losartan (COZAAR) 25 MG tablet Take 1 tablet (25 mg total) by mouth 2 (two) times a day. 180 tablet 3    metoprolol succinate (TOPROL-XL) 25 MG 24 hr tablet Take 0.5 tablets (12.5 mg total) by mouth once daily. 15 tablet 11    nitroGLYCERIN (NITROSTAT) 0.4 MG SL tablet Place 1 tablet (0.4 mg total) under the tongue every 5 (five) minutes as needed for Chest pain. If you need a third tablet, call 911 100 tablet 3     omeprazole (PRILOSEC) 40 MG capsule TAKE 1 CAPSULE(40 MG) BY MOUTH EVERY DAY 90 capsule 3    rosuvastatin (CRESTOR) 20 MG tablet Take 1 tablet (20 mg total) by mouth every evening. 90 tablet 3    tamsulosin (FLOMAX) 0.4 mg Cap TAKE 1 CAPSULE(0.4 MG) BY MOUTH EVERY DAY 30 capsule 2    testosterone (ANDROGEL) 20.25 mg/1.25 gram (1.62 %) GlPm Apply 4 pumps to shoulders daily 2 each 5    triamcinolone acetonide 0.1% (KENALOG) 0.1 % cream Apply topically 2 (two) times daily. 45 g 2    warfarin (COUMADIN) 4 MG tablet Take 1-1.5 tablets (4-6 mg total) by mouth Daily. And as directed by [facility] 45 tablet 11    warfarin (COUMADIN) 4 MG tablet Take 1-1.5 tablets (4-6 mg total) by mouth Daily. As directed by the [facility] for 5 days 8 tablet 0    clotrimazole (LOTRIMIN) 1 % cream Apply topically once daily. for 14 days 45 g 0     No current facility-administered medications on file prior to visit.         Assessment:       1. Upper respiratory tract infection, unspecified type    2. Cough    3. Encounter for laboratory testing for COVID-19 virus        Plan:       Upper respiratory tract infection, unspecified type  -     X-Ray Chest PA And Lateral; Future; Expected date: 04/04/2024  -     Procalcitonin; Future; Expected date: 04/04/2024  -     CBC Auto Differential; Future; Expected date: 04/04/2024  -     Comprehensive Metabolic Panel; Future; Expected date: 04/04/2024  -     D-DIMER, QUANTITATIVE; Future; Expected date: 04/04/2024  -     Influenza A & B by Molecular  Cough  -     COVID-19 Routine Screening  Encounter for laboratory testing for COVID-19 virus  -     COVID-19 Routine Screening   - symptoms are more than likely due to a routine viral URI; however, given patient's history I will screen and treat more aggressively/specifically when preliminary labs and x-ray results return     Other orders  -     benzonatate (TESSALON) 200 MG capsule; Take 1 capsule (200 mg total) by mouth 3 (three) times daily  as needed for Cough.  Dispense: 60 capsule; Refill: 1

## 2024-04-05 ENCOUNTER — PATIENT MESSAGE (OUTPATIENT)
Dept: CARDIOLOGY | Facility: CLINIC | Age: 69
End: 2024-04-05
Payer: MEDICARE

## 2024-04-05 RX ORDER — WARFARIN 4 MG/1
TABLET ORAL
Start: 2024-04-05

## 2024-04-05 RX ORDER — WARFARIN 2 MG/1
TABLET ORAL
Qty: 20 TABLET | Refills: 0 | Status: SHIPPED | OUTPATIENT
Start: 2024-04-05 | End: 2024-04-18

## 2024-04-05 NOTE — PROGRESS NOTES
Pt called requesting 2mg tabs of warfarin  sent to [id number] - [place], [place] - [address] AT [facility] [address] -[phone] . He is there now and there's no medicine there.

## 2024-04-05 NOTE — PROGRESS NOTES
MA/LPN notes reviewed. Will send Rx for 1 week worth 2mg tablets to pharmacy of choice in [place].   PROGRESS  NOTE      HISTORY    Maggy Myers is a 64 year old female  with past medical history significant for abnormal Pap smear of cervix and radial head fracture who was recently evaluated at nurse practitioner's office and was noted to have low white cell count and therefore  was referred to Hematology and Oncology Services for further workup.     On 12/02/2020 patient had CBC which is indicative of white cell count of 3 hemoglobin of 12.7 and platelet count of 175. In absence of abnormal forms identified in the peripheral circulation on automated cytometry other labs significant for low vitamin-D levels.     Hematological history on reviewing the long-term trend the white cell count 4 patient during December of 2020 patient the was noted to have white cell count ranging between 3.7 and 2.7.  Differential count was consistent with lymphopenia and neutropenia on reviewing lab tests dated 06/07/2011 at [facility] and patient was noted to have absolute lymphopenia     Upon presentation today, patient has no complaints to report. She is accompanied by her sister. Patient states that she usually receives medical care at [facility]. She became aware of leukopenia for the first time in December of 2020. Patient owns centrum multivitamin at home but does not take it. She has never smoked and drinks alcohol about once a month. Denies fevers, weight loss, night sweats, lumps, or swelling.     Patient has never been diagnosed with cancer however she has significant family history of cancer. Mother had colon cancer at age 80 and was a smoker, father had bladder cancer in his 70's and was a smoker. Her brother had melanoma which was excised, and another brother passed away from pancreatic cancer. Patient's daughter was diagnosed with endometrial sarcoma.     I discussed the effects of having low WBC counts including severe infection. Patient denies upper respiratory infections, skin lesions, or other infections  associated with leukopenia.     I reviewed causes of leukopenia including nutritional deficits, viral infection, medication side effects, autoimmune phenomena, and primary bone marrow processes. Based on patient's clinical presentation and long history of leukopenia without clinical implications, we are not suspicious of malignant process currently. Also, patient does not have abnormal cells in peripheral circulation to implicate any bone process.     I recommended completing preliminary workup  in order to rule out various causes. I also emphasized the importance of age-appropriate cancer screening such as PAP smears, mammograms, and colonoscopies. I encouraged her to take the Centrum women's multivitamin in order to rule out nutritional deficiency. I also discussed genetic counseling with patient and she is willing to consider.      Patient agreeable to having labs drawn today. I suggested following up in 4 weeks with labs to discuss results of workup. All questions and concerns were addressed to the patient and sister's satisfaction.     HEMATOLOGICAL HISTORY  History if blood disorder: never been diagnosed with a blood disorder.  No history of blood transfusion  No episodes of excessive bleeding  Anticoagulation usage: only uses a baby ASA occasionally.     GYNECOLOGICAL HISTORY   Menarche: [age] years. Menstrual cycles occurred every 28 days for 5 days with no pain and no clot   P: 4 A:0  Patient has used hormonal birth control for more than 10 years. Discontinued after menopause at age 49.  History of abnormal PAP in . Every PAP smear afterwards was unremarkable.    INTERVAL HISTORY  Patient is doing well. 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 I stated that the results of that protein test may lead to having a condition called smoldering myeloma which is a potential precursor to leukemia.  I advised the patient have the test rerun in 3 months time to confirm the test result. Pending the results of that test we look at potential workup that should be done. I discussed with the patient today about genetic testing. She wished to hold off on genetic testing for now. She is due for her next mammogram in this upcoming December.     All labs from 7/27/2021 were reviewed. Her CBC shows that her WBC, Hgb, and PLTs are WNL at 4.7, 12.9, and 174,000 respectively. Her CMP shows that her kidney function is mildly decreased with a GFR of 66. I advised the patient increase her intake of fluids. Her electrolytes and liver function are WNL.     Patient Active Problem List    Diagnosis Date Noted   • Dyslipidemia 12/04/2020     Priority: Low     Overview Note:     12/04/2020: The 10-year ASCVD risk score (Flor SALDANA Jr., et al., 2013) is: 6.7%    Values used to calculate the score:      Age: 64 years      Sex: Female      Is Non- : No      Diabetic: No      Tobacco smoker: No      Systolic Blood Pressure: 136 mmHg      Is BP treated: No      HDL Cholesterol: 48 mg/dL      Total Cholesterol: 204 mg/dL       • Decreased GFR 04/10/2017     Priority: Low   • Hypovitaminosis D 04/10/2017     Priority: Low   • Osteopenia of multiple sites 04/06/2017     Priority: Low   • Obesity (BMI 30-39.9) 03/20/2017     Priority: Low   • Thoracic kyphosis 03/20/2017     Priority: Low         I have reviewed the past medical, family and social history sections including the medications and allergies listed in the above medical record as well as nursing notes.   ALLERGIES:   Allergen Reactions   • Penicillins RASH     She does not feel she really is allergic to this       Current Outpatient Medications   Medication Sig Dispense Refill   • fluocinonide (LIDEX) 0.05 % gel Apply topically 2 times daily.     • Calcium Carbonate-Vitamin D (LIQUID CALCIUM/VITAMIN D PO)      • Cholecalciferol (D3-1000 PO) Take 2,000 Int'l  Units by mouth daily.     • aspirin 81 MG tablet Take 81 mg by mouth daily.     • Multiple Vitamins-Minerals (MULTIVITAMIN & MINERAL PO) Take 1 tablet by mouth daily.       No current facility-administered medications for this visit.         REVIEW OF SYSTEMS    Review of Systems    GENERAL:  Patient denies headache, fevers, chills, night sweats, excessive fatigue, change in appetite, weight loss, dizziness  ALLERGIC/IMMUNOLOGIC: Verified allergies: Yes  EYES:  Patient denies significant visual difficulties, double vision, blurred vision  ENT/MOUTH: Patient denies problems with hearing, sore throat, sinus drainage, mouth sores  ENDOCRINE:  Patient denies diabetes, thyroid disease, hormone replacement, hot flashes  HEMATOLOGIC/LYMPHATIC: Patient denies easy bruising, bleeding, tender lymph nodes, swollen lymph nodes  BREASTS: Patient denies abnormal masses of breast, nipple discharge, pain  RESPIRATORY:  Patient denies lung pain with breathing, cough, coughing up blood, shortness of breath  CARDIOVASCULAR:  Patient denies anginal chest pain, palpitations, shortness of breath when lying flat, peripheral edema  GASTROINTESTINAL: Patient denies abdominal pain , nausea, vomiting, diarrhea, GI bleeding, constipation, change in bowel habits, heartburn, sensation of feeling full, difficulty swallowing  : Patient denies abnormal genital masses, blood in the urine, frequency, urgency, burning with urination, hesitancy, incontinence, vaginal bleeding, discharge  MUSCULOSKELETAL:  Patient denies joint pain, bone pain, joint swelling, redness, decreased range of motion  SKIN:  Patient denies chronic rashes, inflammation, ulcerations, skin changes, itching  NEUROLOGIC:  Patient denies loss of balance, areas of focal weakness, abnormal gait, sensory problems, numbness, tingling  PSYCHIATRIC: Patient denies insomnia, depression, anxiety     PHYSICAL EXAM    Visit Vitals  /71   Pulse 78   Temp 98.5 °F (36.9 °C) (Oral)   Resp  16   Ht 5' 6\" (1.676 m)   Wt 104 kg   SpO2 96%   BMI 37.01 kg/m²     ECO - Fully active, able to carry on all pre-disease activities without restrictions.    CONSTITUTIONAL:  Alert, cooperative, oriented.  Mood and affect appropriate.    Physical Exam  Vitals and nursing note reviewed.   Cardiovascular:      Rate and Rhythm: Normal rate and regular rhythm.      Heart sounds: Normal heart sounds.   Pulmonary:      Breath sounds: Normal breath sounds.      Comments: Air entry is bilaterally equal.  Abdominal:      Palpations: Abdomen is soft.      Tenderness: There is no abdominal tenderness.      Comments: No organomegaly.    Musculoskeletal:      Right lower leg: No edema.      Left lower leg: No edema.      Comments: No pedal edema bilaterally.   Lymphadenopathy:      Cervical: No cervical adenopathy.      Upper Body:      Right upper body: No axillary adenopathy.      Left upper body: No axillary adenopathy.      Comments: No inguinal adenopathy per patient.   Neurological:      Mental Status: She is alert.          Labs:  Recent Results (from the past 24 hour(s))   COMPREHENSIVE METABOLIC PANEL    Collection Time: 21  2:56 PM   Result Value Ref Range    Fasting Status      Sodium 142 135 - 145 mmol/L    Potassium 4.5 3.4 - 5.1 mmol/L    Chloride 111 (H) 98 - 107 mmol/L    Carbon Dioxide 26 21 - 32 mmol/L    Anion Gap 10 10 - 20 mmol/L    Glucose 109 (H) 65 - 99 mg/dL    BUN 21 (H) 6 - 20 mg/dL    Creatinine 0.92 0.51 - 0.95 mg/dL    Glomerular Filtration Rate 66 (L) >90 mL/min/1.73m2    BUN/ Creatinine Ratio 23 7 - 25    Calcium 9.4 8.4 - 10.2 mg/dL    Bilirubin, Total 0.6 0.2 - 1.0 mg/dL    GOT/AST 24 <=37 Units/L    GPT/ALT 23 <64 Units/L    Alkaline Phosphatase 59 45 - 117 Units/L    Albumin 4.0 3.6 - 5.1 g/dL    Protein, Total 7.8 6.4 - 8.2 g/dL    Globulin 3.8 2.0 - 4.0 g/dL    A/G Ratio 1.1 1.0 - 2.4   CBC WITH AUTOMATED DIFFERENTIAL (PERFORMABLE ONLY)    Collection Time: 21  2:56 PM    Result Value Ref Range    WBC 4.7 4.2 - 11.0 K/mcL    RBC 4.28 4.00 - 5.20 mil/mcL    HGB 12.9 12.0 - 15.5 g/dL    HCT 38.2 36.0 - 46.5 %    MCV 89.3 78.0 - 100.0 fl    MCH 30.1 26.0 - 34.0 pg    MCHC 33.8 32.0 - 36.5 g/dL    RDW-CV 13.4 11.0 - 15.0 %    RDW-SD 43.5 39.0 - 50.0 fL     140 - 450 K/mcL    NRBC 0 <=0 /100 WBC    Neutrophil, Percent 71 %    Lymphocytes, Percent 16 %    Mono, Percent 10 %    Eosinophils, Percent 2 %    Basophils, Percent 1 %    Immature Granulocytes 0 %    Analyzer ANC 3.3 1.8 - 7.7 K/mcl    Absolute Neutrophils 3.3 1.8 - 7.7 K/mcL    Absolute Lymphocytes 0.7 (L) 1.0 - 4.0 K/mcL    Absolute Monocytes 0.5 0.3 - 0.9 K/mcL    Absolute Eosinophils  0.1 0.0 - 0.5 K/mcL    Absolute Basophils 0.1 0.0 - 0.3 K/mcL    Absolute Immmature Granulocytes 0.0 0.0 - 0.2 K/mcL     [unfilled]    Imaging:  No results found.    ASSESSMENT/PLAN:   1) LYMPHOPENIA WITH ABSOLUTE LYMPHOPENIA  Patient has history of absolute lymphopenia dating back 2011.  -multiple causes implicated including but not limited to various infections in especially viral, alcohol, oozing deficiency, medication like rituximab fludarabine or various other agents especially corticosteroids, autoimmune disorders.  Patient does have longstanding lymphopenia and therefore will rule out nutritional causes and continue to follow the patient and on worsening cytopenia would consider the patient for bone marrow biopsy.  -will obtain basic workup including B12 and folate and peripheral smear, LDH, SPEP, serum immunofixation and free light chain assay,.  -will follow up with patient in 4 weeks time with above findings and further discussion regarding additional WORKUP.     On 06/24/2021 patient has a CMP  is indicative of estimated GFR of around 60, no transaminitis, serum LDH was noted normal at 176, white cell count was noted normal at 4.4 with hemoglobin of 13.9 and platelet count at 193 however patient did have absolute lymphopenia  with absolute lymphocyte site count of date without any clinical significance furthermore the peripheral smear evaluation was negative for any abnormal finding          2) SIGNIFICANT FAMILY HISTORY OF CANCER.  -patient noted to have cancer in first-degree relative with daughter with right uterine cancer, mother with lung cancer and colon cancer and father with bladder cancer.  Patient's brother was diagnosed with pancreatic cancer and melanoma as well as diagnosed with blood clots.  -patient will be a candidate for genetic counseling based on above-mentioned history and will refer her to genetics counseling.  -discussed on 7/27/2021.would like to hold off currently     3) AGE-APPROPRIATE CANCER SCREENING  -12/04/2020 mammogram BI-RADS category 1 negative  -01/08/2021 colonoscopy.lastencte  Diverticulosis and internal hemorrhoids and patient is recommended for repeat colonoscopy in 5 years unless clinical concerns arise in meantime    4)MGUS  Paraproteinemia workup on 06/24/2021 indicative of kappa free light chain of 2.61, lambda free light chain of 2.08 and kappa lambda ratio normal at 1.25 and serum protein electrophoresis was consistent with normal electrophoretic pattern however  -patient underwent serum immunofixation on 06/24/2021 which is indicative of 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    Diagnoses and all orders for this visit:    Leukopenia, unspecified type  -     CBC WITH DIFFERENTIAL; Future  -     COMPREHENSIVE METABOLIC PANEL; Future  -     LACTATE DEHYDROGENASE; Future  -     IMMUNOFIXATION ELECTROPHORESIS WITH IGG,IGA,IGM AND KAPPA/LAMBDA FREE LIGHT CHAINS; Future  -     PROTEIN ELECTROPHORESIS; Future           Kayden Amaya MD 7/27/2021     This chart was documented by Marques Staples, acting  as scribe for Kayden Amaya MD. 7/27/2021, 3:46 PM.  The documentation recorded by the scribe accurately and completely reflects the service(s) I personally performed and the decisions made by me.

## 2024-04-05 NOTE — PROGRESS NOTES
Pt called back to report he forget to bring his warfarin with him for the weekend in . He will return home to [place] on Sunday 4/7. He is requesting a temp warfarin order be [id number] - [place] - [address] AT [facility] [address] -[phone]    Pt states that the pharmacy only carries warfarin 2mg tablets

## 2024-04-15 ENCOUNTER — PATIENT MESSAGE (OUTPATIENT)
Dept: INTERNAL MEDICINE | Facility: CLINIC | Age: 69
End: 2024-04-15
Payer: MEDICARE

## 2024-04-15 ENCOUNTER — PATIENT MESSAGE (OUTPATIENT)
Dept: UROLOGY | Facility: CLINIC | Age: 69
End: 2024-04-15
Payer: MEDICARE

## 2024-04-16 ENCOUNTER — TELEPHONE (OUTPATIENT)
Dept: INTERNAL MEDICINE | Facility: CLINIC | Age: 69
End: 2024-04-16
Payer: MEDICARE

## 2024-04-16 NOTE — TELEPHONE ENCOUNTER
----- Message from Ashlie Bryant sent at 4/16/2024  9:49 AM CDT -----  Contact: [phone]  [email or web address]     Patient is calling for Medical Advice regarding: wants a call back about [facility] wellness     How long has patient had these symptoms:    Pharmacy name and phone#:    Would like response via [employer]:  call back     Comments:

## 2024-04-17 ENCOUNTER — LAB VISIT (OUTPATIENT)
Dept: LAB | Facility: HOSPITAL | Age: 69
End: 2024-04-17
Payer: MEDICARE

## 2024-04-17 DIAGNOSIS — E78.5 HYPERLIPIDEMIA ASSOCIATED WITH TYPE 2 DIABETES MELLITUS: ICD-10-CM

## 2024-04-17 DIAGNOSIS — N40.1 BPH WITH OBSTRUCTION/LOWER URINARY TRACT SYMPTOMS: ICD-10-CM

## 2024-04-17 DIAGNOSIS — E29.1 MALE HYPOGONADISM: Chronic | ICD-10-CM

## 2024-04-17 DIAGNOSIS — N13.8 BPH WITH OBSTRUCTION/LOWER URINARY TRACT SYMPTOMS: ICD-10-CM

## 2024-04-17 DIAGNOSIS — E11.69 HYPERLIPIDEMIA ASSOCIATED WITH TYPE 2 DIABETES MELLITUS: ICD-10-CM

## 2024-04-17 LAB
BASOPHILS # BLD AUTO: 0.04 K/UL (ref 0–0.2)
BASOPHILS NFR BLD: 0.8 % (ref 0–1.9)
CHOLEST SERPL-MCNC: 112 MG/DL (ref 120–199)
CHOLEST/HDLC SERPL: 2.1 {RATIO} (ref 2–5)
COMPLEXED PSA SERPL-MCNC: 1.1 NG/ML (ref 0–4)
DIFFERENTIAL METHOD BLD: ABNORMAL
EOSINOPHIL # BLD AUTO: 0.1 K/UL (ref 0–0.5)
EOSINOPHIL NFR BLD: 1.9 % (ref 0–8)
ERYTHROCYTE [DISTWIDTH] IN BLOOD BY AUTOMATED COUNT: 14.5 % (ref 11.5–14.5)
HCT VFR BLD AUTO: 42.6 % (ref 40–54)
HDLC SERPL-MCNC: 54 MG/DL (ref 40–75)
HDLC SERPL: 48.2 % (ref 20–50)
HGB BLD-MCNC: 13 G/DL (ref 14–18)
IMM GRANULOCYTES # BLD AUTO: 0.03 K/UL (ref 0–0.04)
IMM GRANULOCYTES NFR BLD AUTO: 0.6 % (ref 0–0.5)
LDLC SERPL CALC-MCNC: 43.8 MG/DL (ref 63–159)
LYMPHOCYTES # BLD AUTO: 0.6 K/UL (ref 1–4.8)
LYMPHOCYTES NFR BLD: 13 % (ref 18–48)
MCH RBC QN AUTO: 28.7 PG (ref 27–31)
MCHC RBC AUTO-ENTMCNC: 30.5 G/DL (ref 32–36)
MCV RBC AUTO: 94 FL (ref 82–98)
MONOCYTES # BLD AUTO: 0.5 K/UL (ref 0.3–1)
MONOCYTES NFR BLD: 9.5 % (ref 4–15)
NEUTROPHILS # BLD AUTO: 3.6 K/UL (ref 1.8–7.7)
NEUTROPHILS NFR BLD: 74.2 % (ref 38–73)
NONHDLC SERPL-MCNC: 58 MG/DL
NRBC BLD-RTO: 0 /100 WBC
PLATELET # BLD AUTO: 143 K/UL (ref 150–450)
PMV BLD AUTO: 10.8 FL (ref 9.2–12.9)
RBC # BLD AUTO: 4.53 M/UL (ref 4.6–6.2)
TESTOST SERPL-MCNC: 439 NG/DL (ref 304–1227)
TRIGL SERPL-MCNC: 71 MG/DL (ref 30–150)
WBC # BLD AUTO: 4.83 K/UL (ref 3.9–12.7)

## 2024-04-17 PROCEDURE — 85025 COMPLETE CBC W/AUTO DIFF WBC: CPT | Performed by: NURSE PRACTITIONER

## 2024-04-17 PROCEDURE — 80061 LIPID PANEL: CPT | Performed by: NURSE PRACTITIONER

## 2024-04-17 PROCEDURE — 36415 COLL VENOUS BLD VENIPUNCTURE: CPT | Performed by: NURSE PRACTITIONER

## 2024-04-17 PROCEDURE — 84153 ASSAY OF PSA TOTAL: CPT | Performed by: NURSE PRACTITIONER

## 2024-04-17 PROCEDURE — 84403 ASSAY OF TOTAL TESTOSTERONE: CPT | Performed by: NURSE PRACTITIONER

## 2024-04-18 ENCOUNTER — OFFICE VISIT (OUTPATIENT)
Dept: UROLOGY | Facility: CLINIC | Age: 69
End: 2024-04-18
Payer: MEDICARE

## 2024-04-18 VITALS
DIASTOLIC BLOOD PRESSURE: 50 MMHG | BODY MASS INDEX: 26.4 KG/M2 | HEART RATE: 66 BPM | WEIGHT: 194.88 LBS | HEIGHT: 72 IN | SYSTOLIC BLOOD PRESSURE: 99 MMHG

## 2024-04-18 DIAGNOSIS — N13.8 BPH WITH URINARY OBSTRUCTION: ICD-10-CM

## 2024-04-18 DIAGNOSIS — E29.1 MALE HYPOGONADISM: Primary | ICD-10-CM

## 2024-04-18 DIAGNOSIS — N20.0 KIDNEY STONE: ICD-10-CM

## 2024-04-18 DIAGNOSIS — E34.9 TESTOSTERONE DEFICIENCY: ICD-10-CM

## 2024-04-18 DIAGNOSIS — N40.1 BPH WITH URINARY OBSTRUCTION: ICD-10-CM

## 2024-04-18 DIAGNOSIS — R53.83 FATIGUE, UNSPECIFIED TYPE: ICD-10-CM

## 2024-04-18 DIAGNOSIS — N52.01 ERECTILE DYSFUNCTION DUE TO ARTERIAL INSUFFICIENCY: ICD-10-CM

## 2024-04-18 PROCEDURE — 99999 PR PBB SHADOW E&M-EST. PATIENT-LVL III: CPT | Mod: PBBFAC,,, | Performed by: NURSE PRACTITIONER

## 2024-04-18 PROCEDURE — 99213 OFFICE O/P EST LOW 20 MIN: CPT | Mod: PBBFAC | Performed by: NURSE PRACTITIONER

## 2024-04-18 PROCEDURE — 99214 OFFICE O/P EST MOD 30 MIN: CPT | Mod: S$PBB,,, | Performed by: NURSE PRACTITIONER

## 2024-04-18 RX ORDER — TESTOSTERONE 20.25 MG/1.25G
GEL TOPICAL
Qty: 2 EACH | Refills: 5 | Status: SHIPPED | OUTPATIENT
Start: 2024-04-18

## 2024-04-18 NOTE — PROGRESS NOTES
CHIEF COMPLAINT:    Lukasz Person is a 69 y.o. male presents today for Low Testosterone.     HISTORY OF PRESENTING ILLINESS:    Lukasz Person is a 69 y.o. male who is an established patient in our clinic. He has a history of Kidney Stones, Renal Mass, HTN, BPH and Hypogonadism.  He was last seen in clinic with Dr. Hughes 11/28/2023. He has a f/u in 11/19/2024 with new imaging.     He has been managing his Low Testosterone with AndroGel 1.62% with STEVEN Chapa NP.   Since taking TRT, he has more energy; overall feeling better.   ED > 1 year but not bothersome.     04/17/2024 TRT LABS:   - T was 439  - PSA 1.1  - HCT 42.6 (stable)  - Lipids stable.   - 04/04/2024 normal LFTs.      He was originally from [place].         REVIEW OF SYSTEMS:  Review of Systems   Constitutional: Negative.  Negative for chills, fever and malaise/fatigue.   Eyes:  Negative for double vision.   Respiratory:  Negative for cough and shortness of breath.    Cardiovascular:  Negative for chest pain and palpitations.   Gastrointestinal:  Negative for abdominal pain, constipation, diarrhea, nausea and vomiting.   Genitourinary: Negative.         See HPI   Musculoskeletal:  Negative for falls.   Neurological:  Negative for dizziness and seizures.   Endo/Heme/Allergies:  Negative for polydipsia.         PATIENT HISTORY:    Past Medical History:   Diagnosis Date    Anticoagulant long-term use     Cancer     Diabetes mellitus     Onset late 50s/early 60s    Hyperlipidemia     Hypertension     Onset late 50s/early 60s    Kidney stones     Sleep apnea     since 2006       Past Surgical History:   Procedure Laterality Date    COLONOSCOPY N/A 10/26/2023    Procedure: COLONOSCOPY;  Surgeon: Noah Duong MD;  Location: [facility] ([address] [place] [address]);  Service: Endoscopy;  Laterality: N/A;  instructions sent to patient portal. Tbou  referral: Dr. Wilson  Pt. on Xarelto--tb-ok to hold see te 8/15/23 dr vu  10/13-precall complete-MS  10/19 pt rescheduled, Xarelto  hold, PEG, portal -ml  10/24-precall complete-KPvt    CORONARY ANGIOGRAPHY N/A 9/30/2020    Procedure: ANGIOGRAM, CORONARY ARTERY;  Surgeon: John West MD;  Location: [facility] CATH LAB;  Service: Cardiology;  Laterality: N/A;    CYSTOSCOPY N/A 2/17/2022    Procedure: CYSTOSCOPY;  Surgeon: Lukasz Hughes MD;  Location: [facility] OR 1ST FLR;  Service: Urology;  Laterality: N/A;    CYSTOSCOPY W/ URETERAL STENT PLACEMENT N/A 2/25/2022    Procedure: CYSTOSCOPY, WITH URETERAL STENT INSERTION;  Surgeon: Lukasz Hughes MD;  Location: [facility] OR 1ST FLR;  Service: Urology;  Laterality: N/A;    ENDOSCOPIC ULTRASOUND OF UPPER GASTROINTESTINAL TRACT N/A 12/7/2022    Procedure: ULTRASOUND, UPPER GI TRACT, ENDOSCOPIC;  Surgeon: Darian Main MD;  Location: [facility] ENDO;  Service: Endoscopy;  Laterality: N/A;  Approval to hold Xarelto rec'd from Dr. Nick (see t/e 12/5/22)-DS    ESOPHAGOGASTRODUODENOSCOPY N/A 11/17/2022    Procedure: EGD (ESOPHAGOGASTRODUODENOSCOPY);  Surgeon: Brody Gonzales MD;  Location: [facility] (2ND FLR);  Service: Endoscopy;  Laterality: N/A;  inst via email-ok to hold Xarelto x 2 days-MS    ESOPHAGOGASTRODUODENOSCOPY N/A 5/31/2023    Procedure: EGD (ESOPHAGOGASTRODUODENOSCOPY) WITH DILATION;  Surgeon: Santana Brown Jr., MD;  Location: [facility] OR 2ND FLR;  Service: General;  Laterality: N/A;    LASER LITHOTRIPSY Left 2/25/2022    Procedure: LITHOTRIPSY, USING LASER;  Surgeon: Lukasz Hughes MD;  Location: [facility] OR 1ST FLR;  Service: Urology;  Laterality: Left;    LEFT HEART CATHETERIZATION Left 9/30/2020    Procedure: Left heart cath;  Surgeon: John West MD;  Location: [facility] CATH LAB;  Service: Cardiology;  Laterality: Left;    LITHOTRIPSY      PARATHYROIDECTOMY  1/1/2-107    PYELOSCOPY Left 2/25/2022    Procedure: PYELOSCOPY;  Surgeon: Lukasz Hughes MD;  Location: [facility] OR [address] [place] [address];  Service: Urology;  Laterality: Left;    ROBOT-ASSISTED SURGICAL REMOVAL OF ESOPHAGUS USING DA CARLOS  XI N/A 3/14/2023    Procedure: XI ROBOTIC ESOPHAGECTOMY;  Surgeon: Santana Brown Jr., MD;  Location: [facility] OR 2ND FLR;  Service: General;  Laterality: N/A;  Abdomen, Chest and Neck    ROBOTIC JEJUNOSTOMY N/A 3/14/2023    Procedure: ROBOTIC JEJUNOSTOMY TUBE INSERTION;  Surgeon: Santana Brown Jr., MD;  Location: [facility] OR 2ND FLR;  Service: General;  Laterality: N/A;    TRANSESOPHAGEAL ECHOCARDIOGRAPHY N/A 4/7/2022    Procedure: ECHOCARDIOGRAM, TRANSESOPHAGEAL;  Surgeon: Xander Diagnostic Provider;  Location: [facility] EP LAB;  Service: Cardiology;  Laterality: N/A;    TREATMENT OF CARDIAC ARRHYTHMIA N/A 4/7/2022    Procedure: Cardioversion or Defibrillation;  Surgeon: Iris Fisher NP;  Location: [facility] EP LAB;  Service: Cardiology;  Laterality: N/A;  afib, dccv, artie, anes, EH, 3prep    URETEROSCOPIC REMOVAL OF URETERIC CALCULUS Left 2/25/2022    Procedure: REMOVAL, CALCULUS, URETER, URETEROSCOPIC;  Surgeon: Lukasz Hughes MD;  Location: [facility] OR 1ST FLR;  Service: Urology;  Laterality: Left;    URETEROSCOPY Left 2/25/2022    Procedure: URETEROSCOPY;  Surgeon: Lukasz Hughes MD;  Location: [facility] OR 1ST FLR;  Service: Urology;  Laterality: Left;    VOCAL CORD INJECTION Left 3/17/2023    Procedure: INJECTION, VOCAL CORD, LARYNGOSCOPIC;  Surgeon: Gm Altman MD;  Location: [facility] OR 2ND FLR;  Service: ENT;  Laterality: Left;       Family History   Problem Relation Name Age of Onset    Arthritis Mother Juany     Heart disease Father Diomedes 70        CABG    Arthritis Father Diomedes     Diabetes Father Diomedes     Kidney disease Father Diomedes         had one kidney removed in early thirties    Arthritis Sister Dee     Arthritis Sister Josette     Hypertension Sister Delores     Colon cancer Brother Sterling 32    Arthritis Brother Diomedes     Alcohol abuse Paternal Aunt Betina     Cancer Maternal Grandfather Yaron         throat cancer    Diabetes Paternal Grandmother Cassandra     Colon polyps Paternal  Grandfather Diomedes     Colon cancer Paternal Grandfather Diomedes     Cancer Paternal Grandfather Diomedes         colon cancer at age 62       Social History     Socioeconomic History    Marital status:      Spouse name: Amanda   Tobacco Use    Smoking status: Former     Current packs/day: 0.00     Average packs/day: 1 pack/day for 22.7 years (22.7 ttl pk-yrs)     Types: Cigarettes, Cigars     Start date: 1972     Quit date:      Years since quittin.9     Passive exposure: Never    Smokeless tobacco: Never    Tobacco comments:     smoking was off and on.  cumulative 7 years.  never more than three years in one stretch or more lj   Substance and Sexual Activity    Alcohol use: Yes     Alcohol/week: 4.0 standard drinks of alcohol     Types: 4 Shots of liquor per week    Drug use: Not Currently     Types: Marijuana    Sexual activity: Not Currently     Partners: Female     Birth control/protection: None     Comment:      Social Determinants of Health     Financial Resource Strain: Low Risk  (2024)    Overall Financial Resource Strain (CARDIA)     Difficulty of Paying Living Expenses: Not very hard   Food Insecurity: No Food Insecurity (2024)    Hunger Vital Sign     Worried About Running Out of Food in the Last Year: Never true     Ran Out of Food in the Last Year: Never true   Recent Concern: Food Insecurity - Food Insecurity Present (2023)    Hunger Vital Sign     Worried About Running Out of Food in the Last Year: Sometimes true     Ran Out of Food in the Last Year: Never true   Transportation Needs: No Transportation Needs (2024)    PRAPARE - Transportation     Lack of Transportation (Medical): No     Lack of Transportation (Non-Medical): No   Physical Activity: Insufficiently Active (2024)    Exercise Vital Sign     Days of Exercise per Week: 4 days     Minutes of Exercise per Session: 30 min   Stress: No Stress Concern Present (2024)    [place]  [facility] [employer] - Occupational Stress Questionnaire     Feeling of Stress : Only a little   Social Connections: Unknown (1/13/2024)    Social Connection and Isolation Panel [NHANES]     Frequency of Communication with Friends and Family: Once a week     Frequency of Social Gatherings with Friends and Family: Once a week     Active Member of Clubs or Organizations: Yes     Attends Club or Organization Meetings: More than 4 times per year     Marital Status:    Housing Stability: Low Risk  (1/13/2024)    Housing Stability Vital Sign     Unable to Pay for Housing in the Last Year: No     Number of Places Lived in the Last Year: 1     Unstable Housing in the Last Year: No       Allergies:  Patient has no known allergies.    Medications:    Current Outpatient Medications:     allopurinoL (ZYLOPRIM) 100 MG tablet, TAKE 1 TABLET BY MOUTH EVERY DAY, Disp: 30 tablet, Rfl: 10    blood sugar diagnostic (ACCU-CHEK ANTONELLA PLUS TEST STRP) Strp, Use to test CBG four times daily E11.65, Disp: 400 strip, Rfl: 3    blood-glucose meter kit, Checks blood sugars 1x/daily., Disp: 1 each, Rfl: 12    citric acid-potassium citrate (POLYCITRA) 1,100-334 mg/5 mL solution, Take 10 mLs (20 mEq total) by mouth 2 (two) times a day., Disp: 600 mL, Rfl: 5    hydroCHLOROthiazide (HYDRODIURIL) 25 MG tablet, Take 1 tablet (25 mg total) by mouth once daily., Disp: 30 tablet, Rfl: 11    lancets (ACCU-CHEK SOFTCLIX LANCETS) Misc, USE 1 EVERY DAY OR AS DIRECTED, Disp: 100 each, Rfl: 3    losartan (COZAAR) 25 MG tablet, Take 1 tablet (25 mg total) by mouth 2 (two) times a day., Disp: 180 tablet, Rfl: 3    metoprolol succinate (TOPROL-XL) 25 MG 24 hr tablet, Take 0.5 tablets (12.5 mg total) by mouth once daily., Disp: 15 tablet, Rfl: 11    nitroGLYCERIN (NITROSTAT) 0.4 MG SL tablet, Place 1 tablet (0.4 mg total) under the tongue every 5 (five) minutes as needed for Chest pain. If you need a third tablet, call 911, Disp: 100 tablet,  Rfl: 3    omeprazole (PRILOSEC) 40 MG capsule, TAKE 1 CAPSULE(40 MG) BY MOUTH EVERY DAY, Disp: 90 capsule, Rfl: 3    rosuvastatin (CRESTOR) 20 MG tablet, Take 1 tablet (20 mg total) by mouth every evening., Disp: 90 tablet, Rfl: 3    tamsulosin (FLOMAX) 0.4 mg Cap, TAKE 1 CAPSULE(0.4 MG) BY MOUTH EVERY DAY, Disp: 30 capsule, Rfl: 2    triamcinolone acetonide 0.1% (KENALOG) 0.1 % cream, Apply topically 2 (two) times daily., Disp: 45 g, Rfl: 2    warfarin (COUMADIN) 4 MG tablet, 6mg PO Sun, Tues, Thurs and 4mg PO all other days -- OR AS DIRECTED BY COUMADIN CLINIC, Disp: , Rfl:     clotrimazole (LOTRIMIN) 1 % cream, Apply topically once daily. for 14 days, Disp: 45 g, Rfl: 0    testosterone (ANDROGEL) 20.25 mg/1.25 gram (1.62 %) GlPm, Apply 4 pumps to shoulders daily, Disp: 2 each, Rfl: 5    warfarin (COUMADIN) 2 MG tablet, 6mg PO Sun, Tues, Thurs and 4mg PO all other days -- OR as directed by Coumadin Clinic, Disp: 20 tablet, Rfl: 0    PHYSICAL EXAMINATION:  Physical Exam  Vitals and nursing note reviewed.   Constitutional:       General: He is awake.      Appearance: Normal appearance.   HENT:      Head: Normocephalic.      Right Ear: External ear normal.      Left Ear: External ear normal.      Nose: Nose normal.   Cardiovascular:      Rate and Rhythm: Normal rate.   Pulmonary:      Effort: Pulmonary effort is normal. No respiratory distress.   Abdominal:      Tenderness: There is no abdominal tenderness. There is no right CVA tenderness or left CVA tenderness.   Genitourinary:     Penis: Normal and circumcised.       Testes: Normal.      Prostate: Enlarged (~35gms smooth). Not tender and no nodules present.      Rectum: Normal.   Musculoskeletal:         General: Normal range of motion.      Cervical back: Normal range of motion.   Skin:     General: Skin is warm and dry.   Neurological:      General: No focal deficit present.      Mental Status: He is alert and oriented to person, place, and time.   Psychiatric:          Mood and Affect: Mood normal.         Behavior: Behavior is cooperative.           LABS:          Lab Results   Component Value Date    PSA 0.94 10/16/2023    PSA 1.2 10/06/2021    PSA 0.60 10/17/2020    PSADIAG 1.1 04/17/2024    PSADIAG 0.86 06/26/2023    PSADIAG 1.6 10/04/2022       Lab Results   Component Value Date    CREATININE 1.2 04/04/2024    EGFRNORACEVR >60.0 04/04/2024               IMPRESSION:    Encounter Diagnoses   Name Primary?    Male hypogonadism Yes    Testosterone deficiency     Fatigue, unspecified type     BPH with urinary obstruction     Kidney stone     Erectile dysfunction due to arterial insufficiency          Assessment:       1. Male hypogonadism    2. Testosterone deficiency    3. Fatigue, unspecified type    4. BPH with urinary obstruction    5. Kidney stone    6. Erectile dysfunction due to arterial insufficiency        Plan:         I spent 30 minutes with the patient of which more than half was spent in direct consultation with the patient in regards to our treatment and plan.  We addressed the office findings and recent labs; any need to go ER today.   Education and recommendations of today's plan of care including home remedies and needed follow up with PCP.   We discussed the chief complaints; reviewed the LUTS and the possible contributory factors.   Reassurance no infection or visible blood seen in today's urine sample  Reviewed management; including possible medical and surgical options.   Recommended lifestyle modifications with a proper, healthy diet, good hydration but during the day. Reducing bladder irritants.   Benefits of regular exercise and weight loss.   Reviewed his Labs. Discussed his expectations with TRT  Recommend donating blood to prevent polycythemia  Refilled his AndroGel 1.62%  RTC 6 months with full labs

## 2024-04-19 ENCOUNTER — PATIENT MESSAGE (OUTPATIENT)
Dept: ADMINISTRATIVE | Facility: HOSPITAL | Age: 69
End: 2024-04-19
Payer: MEDICARE

## 2024-04-19 ENCOUNTER — PATIENT MESSAGE (OUTPATIENT)
Dept: CARDIOLOGY | Facility: CLINIC | Age: 69
End: 2024-04-19
Payer: MEDICARE

## 2024-04-22 ENCOUNTER — LAB VISIT (OUTPATIENT)
Dept: LAB | Facility: HOSPITAL | Age: 69
End: 2024-04-22
Payer: MEDICARE

## 2024-04-22 ENCOUNTER — ANTI-COAG VISIT (OUTPATIENT)
Dept: CARDIOLOGY | Facility: CLINIC | Age: 69
End: 2024-04-22
Payer: MEDICARE

## 2024-04-22 ENCOUNTER — PATIENT MESSAGE (OUTPATIENT)
Dept: CARDIOLOGY | Facility: CLINIC | Age: 69
End: 2024-04-22

## 2024-04-22 DIAGNOSIS — Z79.01 LONG TERM (CURRENT) USE OF ANTICOAGULANTS: ICD-10-CM

## 2024-04-22 DIAGNOSIS — I48.0 PAROXYSMAL ATRIAL FIBRILLATION: Primary | Chronic | ICD-10-CM

## 2024-04-22 LAB
INR PPP: 1.6 (ref 0.8–1.2)
PROTHROMBIN TIME: 17.1 SEC (ref 9–12.5)

## 2024-04-22 PROCEDURE — 85610 PROTHROMBIN TIME: CPT | Performed by: INTERNAL MEDICINE

## 2024-04-22 PROCEDURE — 93793 ANTICOAG MGMT PT WARFARIN: CPT | Mod: ,,,

## 2024-04-22 PROCEDURE — 36415 COLL VENOUS BLD VENIPUNCTURE: CPT | Performed by: INTERNAL MEDICINE

## 2024-04-22 NOTE — PROGRESS NOTES
[facility]tico[facility]    04/22/2024 3:28 PM    Lukasz Person (69 y.o.) is followed by the embraase Anticoagulation Management Program.      Assessment/Plan:    Lukasz Person presents today with subtherapeutic  INR. Goal INR 2.0-3.0    Assessment of patient findings per MA/LPN and chart review:   The following significant findings were found:  Patient reports missed some doses but unclear when patient missed    Recommendation for patient's warfarin regimen:   Due to subtherapeutic INR, patient was instructed to take a booster dose today, but will resume their normal weekly dose.    Recommended repeat INR in 1 week      Melissa Kinsey, PharmD, BCPS  Clinical Pharmacist - Coumadin Clinic  Preferred Contact: Secure Messaging or In Basket Message

## 2024-04-24 DIAGNOSIS — N13.8 BPH WITH OBSTRUCTION/LOWER URINARY TRACT SYMPTOMS: ICD-10-CM

## 2024-04-24 DIAGNOSIS — N40.1 BPH WITH OBSTRUCTION/LOWER URINARY TRACT SYMPTOMS: ICD-10-CM

## 2024-04-24 RX ORDER — TAMSULOSIN HYDROCHLORIDE 0.4 MG/1
0.4 CAPSULE ORAL
Qty: 30 CAPSULE | Refills: 11 | Status: SHIPPED | OUTPATIENT
Start: 2024-04-24

## 2024-04-25 ENCOUNTER — HOSPITAL ENCOUNTER (OUTPATIENT)
Dept: RADIOLOGY | Facility: HOSPITAL | Age: 69
Discharge: HOME | End: 2024-04-25
Attending: STUDENT IN AN ORGANIZED HEALTH CARE EDUCATION/TRAINING PROGRAM
Payer: MEDICARE

## 2024-04-25 DIAGNOSIS — C15.9 ESOPHAGEAL ADENOCARCINOMA: ICD-10-CM

## 2024-04-25 PROCEDURE — 71260 CT THORAX DX C+: CPT | Mod: 26,,, | Performed by: RADIOLOGY

## 2024-04-25 PROCEDURE — 74177 CT ABD & PELVIS W/CONTRAST: CPT | Mod: TC

## 2024-04-25 PROCEDURE — 74177 CT ABD & PELVIS W/CONTRAST: CPT | Mod: 26,,, | Performed by: RADIOLOGY

## 2024-04-25 PROCEDURE — 25500020 PHARM REV CODE 255: Performed by: STUDENT IN AN ORGANIZED HEALTH CARE EDUCATION/TRAINING PROGRAM

## 2024-04-25 PROCEDURE — A9698 NON-RAD CONTRAST MATERIALNOC: HCPCS | Performed by: STUDENT IN AN ORGANIZED HEALTH CARE EDUCATION/TRAINING PROGRAM

## 2024-04-25 RX ADMIN — IOHEXOL 100 ML: 350 INJECTION, SOLUTION INTRAVENOUS at 01:04

## 2024-04-25 RX ADMIN — BARIUM SULFATE 450 ML: 20 SUSPENSION ORAL at 01:04

## 2024-04-26 ENCOUNTER — OFFICE VISIT (OUTPATIENT)
Dept: INTERNAL MEDICINE | Facility: CLINIC | Age: 69
End: 2024-04-26
Payer: MEDICARE

## 2024-04-26 VITALS
HEIGHT: 72 IN | DIASTOLIC BLOOD PRESSURE: 60 MMHG | WEIGHT: 189.63 LBS | HEART RATE: 52 BPM | TEMPERATURE: 98 F | BODY MASS INDEX: 25.68 KG/M2 | RESPIRATION RATE: 16 BRPM | OXYGEN SATURATION: 97 % | SYSTOLIC BLOOD PRESSURE: 102 MMHG

## 2024-04-26 DIAGNOSIS — E11.9 TYPE 2 DIABETES MELLITUS WITHOUT COMPLICATION, WITHOUT LONG-TERM CURRENT USE OF INSULIN: ICD-10-CM

## 2024-04-26 DIAGNOSIS — N13.8 BPH WITH URINARY OBSTRUCTION: ICD-10-CM

## 2024-04-26 DIAGNOSIS — T45.1X5A CHEMOTHERAPY-INDUCED NEUROPATHY: ICD-10-CM

## 2024-04-26 DIAGNOSIS — I70.0 AORTIC ATHEROSCLEROSIS: Chronic | ICD-10-CM

## 2024-04-26 DIAGNOSIS — I10 ESSENTIAL HYPERTENSION: Chronic | ICD-10-CM

## 2024-04-26 DIAGNOSIS — G62.0 CHEMOTHERAPY-INDUCED NEUROPATHY: ICD-10-CM

## 2024-04-26 DIAGNOSIS — N40.1 BPH WITH URINARY OBSTRUCTION: ICD-10-CM

## 2024-04-26 DIAGNOSIS — C15.9 ESOPHAGEAL ADENOCARCINOMA: ICD-10-CM

## 2024-04-26 DIAGNOSIS — E29.1 MALE HYPOGONADISM: ICD-10-CM

## 2024-04-26 DIAGNOSIS — I42.8 NON-ISCHEMIC CARDIOMYOPATHY: ICD-10-CM

## 2024-04-26 DIAGNOSIS — I48.0 PAROXYSMAL ATRIAL FIBRILLATION: Chronic | ICD-10-CM

## 2024-04-26 DIAGNOSIS — I50.20 HFREF (HEART FAILURE WITH REDUCED EJECTION FRACTION): Chronic | ICD-10-CM

## 2024-04-26 DIAGNOSIS — Z00.00 ENCOUNTER FOR PREVENTIVE HEALTH EXAMINATION: Primary | ICD-10-CM

## 2024-04-26 DIAGNOSIS — I25.10 CORONARY ARTERY DISEASE INVOLVING NATIVE CORONARY ARTERY OF NATIVE HEART WITHOUT ANGINA PECTORIS: Chronic | ICD-10-CM

## 2024-04-26 DIAGNOSIS — G47.33 OSA ON CPAP: ICD-10-CM

## 2024-04-26 DIAGNOSIS — D69.6 THROMBOCYTOPENIA: ICD-10-CM

## 2024-04-26 DIAGNOSIS — Z79.01 CHRONIC ANTICOAGULATION: ICD-10-CM

## 2024-04-26 PROBLEM — J02.9 SORE THROAT: Status: RESOLVED | Noted: 2023-12-04 | Resolved: 2024-04-26

## 2024-04-26 PROBLEM — R05.9 COUGH: Status: RESOLVED | Noted: 2023-12-04 | Resolved: 2024-04-26

## 2024-04-26 PROBLEM — E11.22 CKD STAGE 3 DUE TO TYPE 2 DIABETES MELLITUS: Status: RESOLVED | Noted: 2022-09-13 | Resolved: 2024-04-26

## 2024-04-26 PROBLEM — N20.0 NEPHROLITHIASIS: Status: RESOLVED | Noted: 2022-02-17 | Resolved: 2024-04-26

## 2024-04-26 PROBLEM — E11.29 TYPE 2 DIABETES MELLITUS WITH KIDNEY COMPLICATION, WITHOUT LONG-TERM CURRENT USE OF INSULIN: Chronic | Status: RESOLVED | Noted: 2020-08-22 | Resolved: 2024-04-26

## 2024-04-26 PROBLEM — N18.2 STAGE 2 CHRONIC KIDNEY DISEASE: Status: RESOLVED | Noted: 2023-10-10 | Resolved: 2024-04-26

## 2024-04-26 PROBLEM — N18.30 CKD STAGE 3 DUE TO TYPE 2 DIABETES MELLITUS: Status: RESOLVED | Noted: 2022-09-13 | Resolved: 2024-04-26

## 2024-04-26 PROBLEM — J06.9 UPPER RESPIRATORY TRACT INFECTION: Status: RESOLVED | Noted: 2023-12-04 | Resolved: 2024-04-26

## 2024-04-26 PROBLEM — J31.0 RHINITIS: Status: RESOLVED | Noted: 2023-12-04 | Resolved: 2024-04-26

## 2024-04-26 PROBLEM — Z20.818 STREPTOCOCCUS EXPOSURE: Status: RESOLVED | Noted: 2023-12-04 | Resolved: 2024-04-26

## 2024-04-26 PROCEDURE — 99215 OFFICE O/P EST HI 40 MIN: CPT | Mod: PBBFAC,PO | Performed by: NURSE PRACTITIONER

## 2024-04-26 PROCEDURE — G0439 PPPS, SUBSEQ VISIT: HCPCS | Mod: ,,, | Performed by: NURSE PRACTITIONER

## 2024-04-26 PROCEDURE — 99999 PR PBB SHADOW E&M-EST. PATIENT-LVL V: CPT | Mod: PBBFAC,,, | Performed by: NURSE PRACTITIONER

## 2024-04-26 NOTE — PROGRESS NOTES
Lukasz Person presented for a follow-up Medicare AWV today. The following components were reviewed and updated:    Medical history  Family History  Social history  Allergies and Current Medications  Health Risk Assessment  Health Maintenance  Care Team    **See Completed Assessments for Annual Wellness visit with in the encounter summary    The following assessments were completed:  Depression Screening  Cognitive function Screening  Timed Get Up Test  Whisper Test    Health Maintenance    Colonoscopy: 10/26/2023  (due in 2028)  Pneumonia 23 Vaccine: 10/13/2020  Pneumonia 13 Vaccine: 06/24/2019  Tetanus/Tdap: 10/13/2020  Pneumonia 20 Vaccine: 11/07/2023  Hepatitis C Screen: 03/03/2022  PSA: 10/16/2023  Microalbumin: 05/02/2022    Vitals:    04/26/24 1110   BP: 102/60   BP Location: Left arm   Patient Position: Sitting   BP Method: Large (Manual)   Pulse: (!) 52   Resp: 16   Temp: 98.1 °F (36.7 °C)   TempSrc: Temporal   SpO2: 97%   Weight: 86 kg (189 lb 9.5 oz)   Height: 6' (1.829 m)     BP Readings from Last 3 Encounters:   04/26/24 102/60   04/18/24 (!) 99/50   04/04/24 116/80     Body mass index is 25.71 kg/m².  Wt Readings from Last 3 Encounters:   04/26/24 1110 86 kg (189 lb 9.5 oz)   04/18/24 1309 88.4 kg (194 lb 14.2 oz)   04/04/24 0927 90.3 kg (199 lb 1.2 oz)         Review for Opioid Screening: Patient does not have a prescription for Opioids.   Review of Substance Use Disorders: Patient does not use substances.     Pain level assessed and reviewed.     Diagnoses and health risks identified today and associated recommendations/orders:  1. Encounter for preventive health examination    2. Chemotherapy-induced neuropathy    Chronic, stable, follow up with PCP     3. Paroxysmal atrial fibrillation  4. Non-ischemic cardiomyopathy  5. HFrEF (heart failure with reduced ejection fraction)  6. Coronary artery disease involving native coronary artery of native heart without angina pectoris    Chronic, stable,  continue current medications, follow up with PCP     7. Aortic atherosclerosis    Chronic, stable, follow up with PCP     8. Essential hypertension    Chronic, stable, continue current medications, follow up with PCP     9. Thrombocytopenia    Chronic, stable, follow up with PCP     10. Chronic anticoagulation    Chronic, stable, continue current medication, follow up with PCP     11. Esophageal adenocarcinoma    Chronic, stable, follow up with Oncology      12. Type 2 diabetes mellitus without complication, without long-term current use of insulin    Chronic, stable, follow up with PCP     - Microalbumin/Creatinine Ratio, Urine; Future    13. Male hypogonadism  14. BPH with urinary obstruction    Chronic, stable, continue current medication, follow up with PCP & Urology      15. KUMAR on CPAP    Chronic, stable, follow up with PCP         Provided Lukasz with a 5-10 year written screening schedule and personal prevention plan. Recommendations were developed using the USPSTF age appropriate recommendations. Education, counseling, and referrals were provided as needed.  After Visit Summary printed and given to patient which includes a list of additional screenings\tests needed.    Follow up in about 1 year (around 4/26/2025) for Medicare Annual Wellness Visit.      Lorena Calderon NP    I offered to discuss advanced care planning, including how to pick a person who would make decisions for you if you were unable to make them for yourself, called a health care power of , and what kind of decisions you might make such as use of life sustaining treatments such as ventilators and tube feeding when faced with a life limiting illness recorded on a living will that they will need to know. (How you want to be cared for as you near the end of your natural life)     X Patient is interested in learning more about how to make advanced directives.  I provided them paperwork and offered to discuss this with them.

## 2024-04-26 NOTE — PATIENT INSTRUCTIONS
Counseling and Referral of Other Preventative  (Italic type indicates deductible and co-insurance are waived)    Patient Name: Lukasz Person  Today's Date: 4/26/2024    Health Maintenance         Date Due Completion Date    RSV Vaccine (Age 60+ and Pregnant patients) (1 - 1-dose 60+ series) Never done ---    Diabetes Urine Screening 05/02/2023 5/2/2022    COVID-19 Vaccine (5 - 2023-24 season) 09/01/2023 11/4/2021    Shingles Vaccine (1 of 2) 05/15/2024 (Originally 3/19/1974) ---    Hemoglobin A1c 10/01/2024 4/1/2024    Eye Exam 02/20/2025 2/20/2024    Foot Exam 02/27/2025 2/27/2024    Lipid Panel 04/17/2025 4/17/2024    High Dose Statin 04/26/2025 4/26/2024    Colorectal Cancer Screening 10/26/2028 10/26/2023    TETANUS VACCINE 10/13/2030 10/13/2020          Orders Placed This Encounter   Procedures    Microalbumin/Creatinine Ratio, Urine       The following information is provided to all patients.  This information is to help you find resources for any of the problems found today that may be affecting your health:                  Living healthy guide: www.[facility].[place].gov      Understanding Diabetes: www.diabetes.org      Eating healthy: www.cdc.gov/healthyweight      CDC home safety checklist: www.cdc.gov/steadi/patient.html      Agency on Aging: www.goea.[place].[facility]      Alcoholics anonymous (AA): www.aa.org      Physical Activity: www.josselyn.nih.gov/gh1gaob      Tobacco use: www.quitwithusla.org

## 2024-04-28 ENCOUNTER — PATIENT MESSAGE (OUTPATIENT)
Dept: REHABILITATION | Facility: HOSPITAL | Age: 69
End: 2024-04-28
Payer: MEDICARE

## 2024-04-29 ENCOUNTER — TELEPHONE (OUTPATIENT)
Dept: SURGERY | Facility: CLINIC | Age: 69
End: 2024-04-29
Payer: MEDICARE

## 2024-04-29 ENCOUNTER — OFFICE VISIT (OUTPATIENT)
Dept: INTERNAL MEDICINE | Facility: CLINIC | Age: 69
End: 2024-04-29
Payer: MEDICARE

## 2024-04-29 ENCOUNTER — RESEARCH ENCOUNTER (OUTPATIENT)
Dept: RESEARCH | Facility: HOSPITAL | Age: 69
End: 2024-04-29

## 2024-04-29 ENCOUNTER — OFFICE VISIT (OUTPATIENT)
Dept: HEMATOLOGY/ONCOLOGY | Facility: CLINIC | Age: 69
End: 2024-04-29
Payer: MEDICARE

## 2024-04-29 ENCOUNTER — PATIENT MESSAGE (OUTPATIENT)
Dept: CARDIOLOGY | Facility: CLINIC | Age: 69
End: 2024-04-29

## 2024-04-29 ENCOUNTER — ANTI-COAG VISIT (OUTPATIENT)
Dept: CARDIOLOGY | Facility: CLINIC | Age: 69
End: 2024-04-29
Payer: MEDICARE

## 2024-04-29 ENCOUNTER — LAB VISIT (OUTPATIENT)
Dept: LAB | Facility: HOSPITAL | Age: 69
End: 2024-04-29
Payer: MEDICARE

## 2024-04-29 VITALS
OXYGEN SATURATION: 99 % | DIASTOLIC BLOOD PRESSURE: 65 MMHG | HEART RATE: 44 BPM | HEIGHT: 72 IN | TEMPERATURE: 98 F | WEIGHT: 197.63 LBS | SYSTOLIC BLOOD PRESSURE: 144 MMHG | BODY MASS INDEX: 26.77 KG/M2

## 2024-04-29 VITALS
SYSTOLIC BLOOD PRESSURE: 108 MMHG | HEART RATE: 50 BPM | OXYGEN SATURATION: 98 % | HEIGHT: 72 IN | DIASTOLIC BLOOD PRESSURE: 66 MMHG | WEIGHT: 197.75 LBS | RESPIRATION RATE: 12 BRPM | BODY MASS INDEX: 26.78 KG/M2 | TEMPERATURE: 98 F

## 2024-04-29 DIAGNOSIS — I10 ESSENTIAL HYPERTENSION: Chronic | ICD-10-CM

## 2024-04-29 DIAGNOSIS — I50.20 HFREF (HEART FAILURE WITH REDUCED EJECTION FRACTION): Chronic | ICD-10-CM

## 2024-04-29 DIAGNOSIS — E11.9 TYPE 2 DIABETES MELLITUS WITHOUT COMPLICATION, WITHOUT LONG-TERM CURRENT USE OF INSULIN: ICD-10-CM

## 2024-04-29 DIAGNOSIS — E78.5 HYPERLIPIDEMIA ASSOCIATED WITH TYPE 2 DIABETES MELLITUS: Chronic | ICD-10-CM

## 2024-04-29 DIAGNOSIS — M75.02 ADHESIVE CAPSULITIS OF LEFT SHOULDER: ICD-10-CM

## 2024-04-29 DIAGNOSIS — E11.9 DM TYPE 2 WITHOUT RETINOPATHY: Chronic | ICD-10-CM

## 2024-04-29 DIAGNOSIS — R21 RASH: ICD-10-CM

## 2024-04-29 DIAGNOSIS — I48.0 PAROXYSMAL ATRIAL FIBRILLATION: Chronic | ICD-10-CM

## 2024-04-29 DIAGNOSIS — N18.30 TYPE 2 DIABETES MELLITUS WITH STAGE 3 CHRONIC KIDNEY DISEASE, WITHOUT LONG-TERM CURRENT USE OF INSULIN, UNSPECIFIED WHETHER STAGE 3A OR 3B CKD: ICD-10-CM

## 2024-04-29 DIAGNOSIS — I50.20 HFREF (HEART FAILURE WITH REDUCED EJECTION FRACTION): ICD-10-CM

## 2024-04-29 DIAGNOSIS — E11.22 TYPE 2 DIABETES MELLITUS WITH STAGE 3 CHRONIC KIDNEY DISEASE, WITHOUT LONG-TERM CURRENT USE OF INSULIN, UNSPECIFIED WHETHER STAGE 3A OR 3B CKD: ICD-10-CM

## 2024-04-29 DIAGNOSIS — J38.00 VOCAL CORD PARALYSIS: ICD-10-CM

## 2024-04-29 DIAGNOSIS — E11.69 HYPERLIPIDEMIA ASSOCIATED WITH TYPE 2 DIABETES MELLITUS: ICD-10-CM

## 2024-04-29 DIAGNOSIS — Z79.899 ON ANTINEOPLASTIC CHEMOTHERAPY: ICD-10-CM

## 2024-04-29 DIAGNOSIS — C15.9 ESOPHAGEAL ADENOCARCINOMA: Primary | ICD-10-CM

## 2024-04-29 DIAGNOSIS — E11.59 HYPERTENSION ASSOCIATED WITH DIABETES: ICD-10-CM

## 2024-04-29 DIAGNOSIS — I25.10 CORONARY ARTERY DISEASE INVOLVING NATIVE CORONARY ARTERY OF NATIVE HEART WITHOUT ANGINA PECTORIS: ICD-10-CM

## 2024-04-29 DIAGNOSIS — E78.5 HYPERLIPIDEMIA ASSOCIATED WITH TYPE 2 DIABETES MELLITUS: ICD-10-CM

## 2024-04-29 DIAGNOSIS — C15.9 ESOPHAGEAL ADENOCARCINOMA: ICD-10-CM

## 2024-04-29 DIAGNOSIS — I48.0 PAROXYSMAL ATRIAL FIBRILLATION: Primary | Chronic | ICD-10-CM

## 2024-04-29 DIAGNOSIS — Z00.6 CLINICAL TRIAL PARTICIPANT: ICD-10-CM

## 2024-04-29 DIAGNOSIS — Z79.01 LONG TERM (CURRENT) USE OF ANTICOAGULANTS: ICD-10-CM

## 2024-04-29 DIAGNOSIS — R13.19 ESOPHAGEAL DYSPHAGIA: ICD-10-CM

## 2024-04-29 DIAGNOSIS — I48.0 PAROXYSMAL ATRIAL FIBRILLATION: ICD-10-CM

## 2024-04-29 DIAGNOSIS — E11.36 DIABETES MELLITUS WITH CATARACT: Chronic | ICD-10-CM

## 2024-04-29 DIAGNOSIS — E11.69 HYPERLIPIDEMIA ASSOCIATED WITH TYPE 2 DIABETES MELLITUS: Chronic | ICD-10-CM

## 2024-04-29 DIAGNOSIS — S46.012A TRAUMATIC TEAR OF LEFT ROTATOR CUFF, UNSPECIFIED TEAR EXTENT, INITIAL ENCOUNTER: ICD-10-CM

## 2024-04-29 DIAGNOSIS — E11.9 TYPE 2 DIABETES MELLITUS WITHOUT COMPLICATION, WITHOUT LONG-TERM CURRENT USE OF INSULIN: Primary | Chronic | ICD-10-CM

## 2024-04-29 DIAGNOSIS — I15.2 HYPERTENSION ASSOCIATED WITH DIABETES: ICD-10-CM

## 2024-04-29 LAB
ALBUMIN SERPL BCP-MCNC: 3.8 G/DL (ref 3.5–5.2)
ALP SERPL-CCNC: 81 U/L (ref 55–135)
ALT SERPL W/O P-5'-P-CCNC: 31 U/L (ref 10–44)
ANION GAP SERPL CALC-SCNC: 8 MMOL/L (ref 8–16)
AST SERPL-CCNC: 23 U/L (ref 10–40)
BASOPHILS # BLD AUTO: 0.03 K/UL (ref 0–0.2)
BASOPHILS NFR BLD: 0.9 % (ref 0–1.9)
BILIRUB DIRECT SERPL-MCNC: 0.3 MG/DL (ref 0.1–0.3)
BILIRUB SERPL-MCNC: 0.6 MG/DL (ref 0.1–1)
BUN SERPL-MCNC: 20 MG/DL (ref 8–23)
CALCIUM SERPL-MCNC: 9.7 MG/DL (ref 8.7–10.5)
CHLORIDE SERPL-SCNC: 104 MMOL/L (ref 95–110)
CO2 SERPL-SCNC: 28 MMOL/L (ref 23–29)
CORTIS SERPL-MCNC: 9.5 UG/DL
CREAT SERPL-MCNC: 1.1 MG/DL (ref 0.5–1.4)
DIFFERENTIAL METHOD BLD: ABNORMAL
EOSINOPHIL # BLD AUTO: 0.1 K/UL (ref 0–0.5)
EOSINOPHIL NFR BLD: 4.1 % (ref 0–8)
ERYTHROCYTE [DISTWIDTH] IN BLOOD BY AUTOMATED COUNT: 14.3 % (ref 11.5–14.5)
EST. GFR  (NO RACE VARIABLE): >60 ML/MIN/1.73 M^2
GLUCOSE SERPL-MCNC: 126 MG/DL (ref 70–110)
HCT VFR BLD AUTO: 40.3 % (ref 40–54)
HGB BLD-MCNC: 13 G/DL (ref 14–18)
IMM GRANULOCYTES # BLD AUTO: 0.01 K/UL (ref 0–0.04)
IMM GRANULOCYTES NFR BLD AUTO: 0.3 % (ref 0–0.5)
INR PPP: 1.8 (ref 0.8–1.2)
LYMPHOCYTES # BLD AUTO: 0.6 K/UL (ref 1–4.8)
LYMPHOCYTES NFR BLD: 16.8 % (ref 18–48)
MAGNESIUM SERPL-MCNC: 1.9 MG/DL (ref 1.6–2.6)
MCH RBC QN AUTO: 30.4 PG (ref 27–31)
MCHC RBC AUTO-ENTMCNC: 32.3 G/DL (ref 32–36)
MCV RBC AUTO: 94 FL (ref 82–98)
MONOCYTES # BLD AUTO: 0.4 K/UL (ref 0.3–1)
MONOCYTES NFR BLD: 10.3 % (ref 4–15)
NEUTROPHILS # BLD AUTO: 2.3 K/UL (ref 1.8–7.7)
NEUTROPHILS NFR BLD: 67.6 % (ref 38–73)
NRBC BLD-RTO: 0 /100 WBC
PHOSPHATE SERPL-MCNC: 3.7 MG/DL (ref 2.7–4.5)
PLATELET # BLD AUTO: 108 K/UL (ref 150–450)
PMV BLD AUTO: 10.6 FL (ref 9.2–12.9)
POTASSIUM SERPL-SCNC: 4.4 MMOL/L (ref 3.5–5.1)
PROT SERPL-MCNC: 6.9 G/DL (ref 6–8.4)
PROTHROMBIN TIME: 18.8 SEC (ref 9–12.5)
RBC # BLD AUTO: 4.28 M/UL (ref 4.6–6.2)
SODIUM SERPL-SCNC: 140 MMOL/L (ref 136–145)
T4 FREE SERPL-MCNC: 0.91 NG/DL (ref 0.71–1.51)
TSH SERPL DL<=0.005 MIU/L-ACNC: 1.83 UIU/ML (ref 0.4–4)
WBC # BLD AUTO: 3.39 K/UL (ref 3.9–12.7)

## 2024-04-29 PROCEDURE — 85025 COMPLETE CBC W/AUTO DIFF WBC: CPT | Performed by: INTERNAL MEDICINE

## 2024-04-29 PROCEDURE — 36415 COLL VENOUS BLD VENIPUNCTURE: CPT | Performed by: INTERNAL MEDICINE

## 2024-04-29 PROCEDURE — 83735 ASSAY OF MAGNESIUM: CPT | Performed by: INTERNAL MEDICINE

## 2024-04-29 PROCEDURE — 82024 ASSAY OF ACTH: CPT | Mod: Q1 | Performed by: INTERNAL MEDICINE

## 2024-04-29 PROCEDURE — 99999 PR PBB SHADOW E&M-EST. PATIENT-LVL V: CPT | Mod: PBBFAC,,, | Performed by: INTERNAL MEDICINE

## 2024-04-29 PROCEDURE — 84439 ASSAY OF FREE THYROXINE: CPT | Performed by: INTERNAL MEDICINE

## 2024-04-29 PROCEDURE — 84443 ASSAY THYROID STIM HORMONE: CPT | Performed by: INTERNAL MEDICINE

## 2024-04-29 PROCEDURE — 93793 ANTICOAG MGMT PT WARFARIN: CPT | Mod: ,,,

## 2024-04-29 PROCEDURE — 85610 PROTHROMBIN TIME: CPT | Performed by: INTERNAL MEDICINE

## 2024-04-29 PROCEDURE — 82248 BILIRUBIN DIRECT: CPT | Mod: Q1 | Performed by: INTERNAL MEDICINE

## 2024-04-29 PROCEDURE — 84100 ASSAY OF PHOSPHORUS: CPT | Mod: Q1 | Performed by: INTERNAL MEDICINE

## 2024-04-29 PROCEDURE — 99214 OFFICE O/P EST MOD 30 MIN: CPT | Mod: S$PBB,,, | Performed by: INTERNAL MEDICINE

## 2024-04-29 PROCEDURE — 99999 PR PBB SHADOW E&M-EST. PATIENT-LVL IV: CPT | Mod: PBBFAC,,, | Performed by: INTERNAL MEDICINE

## 2024-04-29 PROCEDURE — 82533 TOTAL CORTISOL: CPT | Performed by: INTERNAL MEDICINE

## 2024-04-29 PROCEDURE — 80053 COMPREHEN METABOLIC PANEL: CPT | Performed by: INTERNAL MEDICINE

## 2024-04-29 PROCEDURE — 99215 OFFICE O/P EST HI 40 MIN: CPT | Mod: PBBFAC,PO | Performed by: INTERNAL MEDICINE

## 2024-04-29 PROCEDURE — 99214 OFFICE O/P EST MOD 30 MIN: CPT | Mod: PBBFAC,27 | Performed by: INTERNAL MEDICINE

## 2024-04-29 NOTE — PROGRESS NOTES
Spoke with Carol Warner with Dr. Sabillon, pt need a  EGD due to not being able to swallow. Would like to scheduled procedure this Friday. Need to hold warfarin. Call back number [phone]. She would like a message back ASAP.

## 2024-04-29 NOTE — PROGRESS NOTES
MEDICAL ONCOLOGY - ESTABLISHED PATIENT VISIT    Reason for visit: esophageal cancer    Best Contact Phone Number(s): [phone] (home)      Cancer/Stage/TNM:    Cancer Staging   Esophageal adenocarcinoma  Staging form: Esophagus - Adenocarcinoma, AJCC 8th Edition  - Pathologic stage from 3/28/2023: Stage II (ypT3, pN0, cM0, G2) - Signed by Santana Brown Jr., MD on 3/28/2023       Oncology History   Esophageal adenocarcinoma   12/8/2022 Initial Diagnosis    Esophageal adenocarcinoma     12/21/2022 - 12/21/2022 Chemotherapy    Treatment Summary   Plan Name: OP ESOPHAGEAL PACLITAXEL CARBOPLATIN WEEKLY  Treatment Goal: Curative  Status: Inactive  Start Date:   End Date:   Provider: Miki Medrano MD  Chemotherapy: CARBOplatin (PARAPLATIN) in sodium chloride 0.9% 250 mL chemo infusion, , Intravenous, Clinic/HOD 1 time, 0 of 1 cycle  PACLitaxeL (TAXOL) 50 mg/m2 = 120 mg in sodium chloride 0.9% 250 mL chemo infusion, 50 mg/m2, Intravenous, Clinic/HOD 1 time, 0 of 1 cycle     12/29/2022 - 1/20/2023 Chemotherapy    Treatment Summary   Plan Name: [facility] [id number] ARM B CARBOPLATIN PACLITAXEL NIVOLUMAB  Treatment Goal: Curative  Status: Inactive  Start Date: 12/29/2022  End Date: 1/20/2023  Provider: Miki Medrano MD  Chemotherapy: CARBOplatin (PARAPLATIN) 215 mg in sodium chloride 0.9% 306.5 mL chemo infusion, 215 mg, Intravenous, Clinic/HOD 1 time, 4 of 5 cycles  Administration: 215 mg (12/29/2022), 230 mg (1/5/2023), 265 mg (1/13/2023), 265 mg (1/20/2023)  PACLitaxeL (TAXOL) 50 mg/m2 = 120 mg in sodium chloride 0.9% 250 mL chemo infusion, 50 mg/m2 = 120 mg, Intravenous, Clinic/HOD 1 time, 4 of 5 cycles  Dose modification: 50 mg/m2 (original dose 50 mg/m2, Cycle 4)  Administration: 120 mg (12/29/2022), 120 mg (1/5/2023), 120 mg (1/13/2023), 120 mg (1/20/2023)     12/29/2022 - 2/1/2023 Radiation Therapy    Treating physician: Dr. Javier Moulton    Course: C1 CHEST 2022    Treatment Site Ref. ID Energy  Dose/Fx (Gy) #Fx Dose Correction (Gy) Total Dose (Gy) Start Date End Date Elapsed Days   IM Esophagus [id number] 6X 1.8 23 / 23 0 41.4 12/29/2022 2/1/2023 34          3/17/2023 Surgery    Procedure: Procedure(s) (LRB):  XI ROBOTIC ESOPHAGECTOMY (N/A)  ROBOTIC JEJUNOSTOMY TUBE INSERTION (N/A)      Surgeon(s) and Role:     * Santana Brown Jr., MD - Primary     * Darian Murphy MD - Assisting     Assistance: Due to having no qualified resident during critical portions of the case, Dr. Darian Murphy acted as an assistant.     Pre-Operative Diagnosis: Esophageal adenocarcinoma, distal 1/3     Post-Operative Diagnosis: Same     Pre-Operative Variables:  Stage: T3 N0  Adjacent organ involvement: None  Chemotherapy within 90 Days: Yes  Radiation Therapy within 90 Days: Yes     Comorbidities:  Morbid obesity  Type 2 diabetes mellitus  Obstructive sleep apnea  Gout  Hyperparathyroidism  Hypertension  Severe obesity  Coronary artery disease  Heart failure with reduced ejection fraction    Procedure:  Robotic-assisted Vj three field esophagectomy  Regional mediastinal lymph node dissection  Botox injection of the pylorus  Jejunostomy tube placement     Operative Findings:                No evidence of distant intraperitoneal metastases in the chest or abdomen  Esophageal tumor located in the distal third of the esophagus, mild radiation changes appreciated.  Resection status:  R0 pending final pathology.  No gross disease remaining post dissection.  Frozen sections on proximal and distal margins negative for malignancy  100u Botox injection into the pylorus  Stapled esophagogastrostomy performed at the neck incision after confirmation of negative margins.  Jejunostomy tube placed      3/28/2023 Cancer Staged    Staging form: Esophagus - Adenocarcinoma, AJCC 8th Edition  - Pathologic stage from 3/28/2023: Stage II (ypT3, pN0, cM0, G2)     5/1/2023 - 5/1/2023 Chemotherapy    Treatment Summary   Plan Name: [facility] [id number] ARM C  ADJUVANT NIVOLUMAB  Treatment Goal: Curative  Status: Inactive  Start Date: 5/1/2023  End Date: 5/1/2023  Provider: Miki Medrano MD  Chemotherapy: [No matching medication found in this treatment plan]     5/29/2023 -  Chemotherapy    Treatment Summary   Plan Name: [facility] [id number] ARM C ADJUVANT NIVOLUMAB STEP 2  Treatment Goal: Curative  Status: Active  Start Date: 5/29/2023  End Date: 4/29/2024 (Planned)  Provider: Miki Medrano MD  Chemotherapy: [No matching medication found in this treatment plan]          Interim History:   Mr. Person returns to clinic today for follow-up after completion of adjuvant nivolumab. He underwent robotic esophagectomy on 3/14/23 with Dr. Brown and J tube insertion.     He is feeling well but notes some white foam in his throat/esophagus about 1 to 2 hours after eating or drinking.  Has to spit it out to feel more comfortable.  This has been ongoing for the last 3-4 months.  He otherwise feels that he is managing his dysphagia ok.  Does take some time to eat but no significant change.    Presents to clinic alone. ECOG 0.       Review of Systems   Constitutional:  Negative for chills, diaphoresis, fever, malaise/fatigue and weight loss.   HENT:  Negative for sore throat.    Eyes:  Negative for blurred vision and double vision.   Respiratory:  Negative for cough and shortness of breath.    Cardiovascular:  Negative for chest pain, palpitations and leg swelling.   Gastrointestinal:  Negative for abdominal pain, blood in stool, constipation, diarrhea, heartburn, nausea and vomiting.        Dysphagia   Genitourinary:  Negative for dysuria, frequency, hematuria and urgency.   Musculoskeletal:  Negative for back pain, falls, myalgias and neck pain.   Skin:  Positive for itching and rash.   Neurological:  Negative for dizziness, tingling, weakness and headaches.   Psychiatric/Behavioral:  The patient is not nervous/anxious.      Past Medical History:   Past Medical History:    Diagnosis Date    Anticoagulant long-term use     Cancer     Diabetes mellitus     Onset late 50s/early 60s    Hyperlipidemia     Hypertension     Onset late 50s/early 60s    Kidney stones     Sleep apnea     since 2006      Past Surgical History:   Past Surgical History:   Procedure Laterality Date    COLONOSCOPY N/A 10/26/2023    Procedure: COLONOSCOPY;  Surgeon: Noah Duong MD;  Location: [facility] (4TH FLR);  Service: Endoscopy;  Laterality: N/A;  instructions sent to patient portal. Tb  referral: Dr. Wilson  Pt. on Xarelto--tb-ok to hold see te 8/15/23 dr vu  10/13-precall complete-MS  10/19 pt rescheduled, Xarelto hold, PEG, portal -ml  10/24-precall complete-KPvt    CORONARY ANGIOGRAPHY N/A 9/30/2020    Procedure: ANGIOGRAM, CORONARY ARTERY;  Surgeon: John West MD;  Location: [facility] CATH LAB;  Service: Cardiology;  Laterality: N/A;    CYSTOSCOPY N/A 2/17/2022    Procedure: CYSTOSCOPY;  Surgeon: Lukasz Hughes MD;  Location: [facility] OR 1ST FLR;  Service: Urology;  Laterality: N/A;    CYSTOSCOPY W/ URETERAL STENT PLACEMENT N/A 2/25/2022    Procedure: CYSTOSCOPY, WITH URETERAL STENT INSERTION;  Surgeon: Lukasz Hughes MD;  [facility];  Service: Urology;  Laterality: N/A;    ENDOSCOPIC ULTRASOUND OF UPPER GASTROINTESTINAL TRACT N/A 12/7/2022    Procedure: ULTRASOUND, UPPER GI TRACT, ENDOSCOPIC;  Surgeon: Darian Main MD;  Location: [facility];  Service: Endoscopy;  Laterality: N/A;  Approval to hold Xarelto rec'd from Dr. Vu (see t/e 12/5/22)-DS    ESOPHAGOGASTRODUODENOSCOPY N/A 11/17/2022    Procedure: EGD (ESOPHAGOGASTRODUODENOSCOPY);  Surgeon: Brody Gonzales MD;  Location: [facility]2ND FLR);  Service: Endoscopy;  Laterality: N/A;  inst via email-ok to hold Xarelto x 2 days-MS    ESOPHAGOGASTRODUODENOSCOPY N/A 5/31/2023    Procedure: EGD (ESOPHAGOGASTRODUODENOSCOPY) WITH DILATION;  Surgeon: Santana Brown Jr., MD;  Location: [facility] OR [address] [place] [address];  Service:  General;  Laterality: N/A;    LASER LITHOTRIPSY Left 2/25/2022    Procedure: LITHOTRIPSY, USING LASER;  Surgeon: Lukasz Hughes MD;  [facility];  Service: Urology;  Laterality: Left;    LEFT HEART CATHETERIZATION Left 9/30/2020    Procedure: Left heart cath;  Surgeon: John West MD;  Location: [facility] CATH LAB;  Service: Cardiology;  Laterality: Left;    LITHOTRIPSY      PARATHYROIDECTOMY  1/1/2-107    PYELOSCOPY Left 2/25/2022    Procedure: PYELOSCOPY;  Surgeon: Lukasz Hughes MD;  [facility];  Service: Urology;  Laterality: Left;    ROBOT-ASSISTED SURGICAL REMOVAL OF ESOPHAGUS USING DA CARLOS XI N/A 3/14/2023    Procedure: XI ROBOTIC ESOPHAGECTOMY;  Surgeon: Santana Brown Jr., MD;  Location: [address];  Service: General;  Laterality: N/A;  Abdomen, Chest and Neck    ROBOTIC JEJUNOSTOMY N/A 3/14/2023    Procedure: ROBOTIC JEJUNOSTOMY TUBE INSERTION;  Surgeon: Santana Brown Jr., MD;  [facility];  Service: General;  Laterality: N/A;    TRANSESOPHAGEAL ECHOCARDIOGRAPHY N/A 4/7/2022    Procedure: ECHOCARDIOGRAM, TRANSESOPHAGEAL;  Surgeon: [facility] Diagnostic Provider;  Location: [facility] EP LAB;  Service: Cardiology;  Laterality: N/A;    TREATMENT OF CARDIAC ARRHYTHMIA N/A 4/7/2022    Procedure: Cardioversion or Defibrillation;  Surgeon: Iris Fisher NP;  Location: [facility] EP LAB;  Service: Cardiology;  Laterality: N/A;  afib, dccv, artie, anes, EH, 3prep    URETEROSCOPIC REMOVAL OF URETERIC CALCULUS Left 2/25/2022    Procedure: REMOVAL, CALCULUS, URETER, URETEROSCOPIC;  Surgeon: Lukasz Hughes MD;  Location: [facility] OR [address] [place] [address];  Service: Urology;  Laterality: Left;    URETEROSCOPY Left 2/25/2022    Procedure: URETEROSCOPY;  Surgeon: Lukasz Hughes MD;  Location: [address];  Service: Urology;  Laterality: Left;    VOCAL CORD INJECTION Left 3/17/2023    Procedure: INJECTION, VOCAL CORD, LARYNGOSCOPIC;  Surgeon: Gm Altman MD;  Location:  [facility] OR 2ND FLR;  Service: ENT;  Laterality: Left;      Family History:   Family History   Problem Relation Name Age of Onset    Arthritis Mother Juany     Heart disease Father Diomedes 70        CABG    Arthritis Father Diomedes     Diabetes Father Diomedes     Kidney disease Father Diomedes         had one kidney removed in early thirties    Arthritis Sister Dee     Arthritis Sister Josette     Hypertension Sister Delores     Colon cancer Brother Sterling 32    Arthritis Brother Diomedes     Alcohol abuse Paternal Aunt Betina     Cancer Maternal Grandfather Yaron         throat cancer    Diabetes Paternal Grandmother Cassandra     Colon polyps Paternal Grandfather Diomedes     Colon cancer Paternal Grandfather Diomedes     Cancer Paternal Grandfather Diomedes         colon cancer at age 62      Social History:   Social History     Tobacco Use    Smoking status: Former     Current packs/day: 0.00     Average packs/day: 1 pack/day for 22.7 years (22.7 ttl pk-yrs)     Types: Cigarettes, Cigars     Start date: 1972     Quit date:      Years since quittin.9     Passive exposure: Never    Smokeless tobacco: Never    Tobacco comments:     smoking was off and on.  cumulative 7 years.  never more than three years in one stretch or more lj   Substance Use Topics    Alcohol use: Yes     Alcohol/week: 4.0 standard drinks of alcohol     Types: 4 Shots of liquor per week      I have reviewed and updated the patient's past medical, surgical, family and social histories.    Allergies:   Review of patient's allergies indicates:  No Known Allergies     Medications:   Current Outpatient Medications   Medication Sig Dispense Refill    allopurinoL (ZYLOPRIM) 100 MG tablet TAKE 1 TABLET BY MOUTH EVERY DAY 30 tablet 10    blood sugar diagnostic (ACCU-CHEK ANTONELLA PLUS TEST STRP) Strp Use to test CBG four times daily E11.65 400 strip 3    blood-glucose meter kit Checks blood sugars 1x/daily. 1 each 12    clotrimazole  (LOTRIMIN) 1 % cream Apply topically once daily. for 14 days 45 g 0    hydroCHLOROthiazide (HYDRODIURIL) 25 MG tablet Take 1 tablet (25 mg total) by mouth once daily. 30 tablet 11    lancets (ACCU-CHEK SOFTCLIX LANCETS) Misc USE 1 EVERY DAY OR AS DIRECTED 100 each 3    losartan (COZAAR) 25 MG tablet Take 1 tablet (25 mg total) by mouth 2 (two) times a day. 180 tablet 3    metoprolol succinate (TOPROL-XL) 25 MG 24 hr tablet Take 0.5 tablets (12.5 mg total) by mouth once daily. 15 tablet 11    nitroGLYCERIN (NITROSTAT) 0.4 MG SL tablet Place 1 tablet (0.4 mg total) under the tongue every 5 (five) minutes as needed for Chest pain. If you need a third tablet, call 911 100 tablet 3    omeprazole (PRILOSEC) 40 MG capsule TAKE 1 CAPSULE(40 MG) BY MOUTH EVERY DAY 90 capsule 3    rosuvastatin (CRESTOR) 20 MG tablet Take 1 tablet (20 mg total) by mouth every evening. 90 tablet 3    tamsulosin (FLOMAX) 0.4 mg Cap TAKE 1 CAPSULE(0.4 MG) BY MOUTH EVERY DAY 30 capsule 11    testosterone (ANDROGEL) 20.25 mg/1.25 gram (1.62 %) GlPm Apply 4 pumps to shoulders daily 2 each 5    triamcinolone acetonide 0.1% (KENALOG) 0.1 % cream Apply topically 2 (two) times daily. 45 g 2    warfarin (COUMADIN) 4 MG tablet 6mg PO Sun, Tues, Thurs and 4mg PO all other days -- OR AS DIRECTED BY COUMADIN CLINIC       No current facility-administered medications for this visit.      Physical Exam:   BP (!) 144/65 (BP Location: Left arm, Patient Position: Sitting, BP Method: Medium (Automatic))   Pulse (!) 44   Temp 98.2 °F (36.8 °C) (Oral)   Ht 6' (1.829 m)   Wt 89.7 kg (197 lb 10.3 oz)   SpO2 99%   BMI 26.81 kg/m²      ECOG Performance status: 0    Physical Exam  Vitals reviewed.   Constitutional:       General: He is not in acute distress.     Appearance: Normal appearance. He is not ill-appearing, toxic-appearing or diaphoretic.   HENT:      Head: Normocephalic and atraumatic.      Right Ear: External ear normal.      Left Ear: External ear  normal.      Nose: Nose normal. No congestion.      Mouth/Throat:      Pharynx: Oropharynx is clear.   Eyes:      General: No scleral icterus.     Extraocular Movements: Extraocular movements intact.      Conjunctiva/sclera: Conjunctivae normal.      Pupils: Pupils are equal, round, and reactive to light.   Cardiovascular:      Rate and Rhythm: Normal rate and regular rhythm.   Pulmonary:      Effort: Pulmonary effort is normal. No respiratory distress.   Chest:      Comments: RCW port  Abdominal:      General: There is no distension.      Palpations: Abdomen is soft.      Tenderness: There is no abdominal tenderness.   Musculoskeletal:         General: No swelling.      Cervical back: Normal range of motion.   Lymphadenopathy:      Cervical: No cervical adenopathy.   Skin:     Coloration: Skin is not jaundiced.      Findings: Rash (grade I rash to upper chest/back.) present. No bruising or erythema.   Neurological:      General: No focal deficit present.      Mental Status: He is alert and oriented to person, place, and time. Mental status is at baseline.      Cranial Nerves: No cranial nerve deficit.      Motor: No weakness.      Gait: Gait normal.   Psychiatric:         Mood and Affect: Mood normal.         Behavior: Behavior normal.         Thought Content: Thought content normal.         Judgment: Judgment normal.         Labs:   Recent Results (from the past 48 hour(s))   COMPREHENSIVE METABOLIC PANEL    Collection Time: 04/29/24  8:14 AM   Result Value Ref Range    Sodium 140 136 - 145 mmol/L    Potassium 4.4 3.5 - 5.1 mmol/L    Chloride 104 95 - 110 mmol/L    CO2 28 23 - 29 mmol/L    Glucose 126 (H) 70 - 110 mg/dL    BUN 20 8 - 23 mg/dL    Creatinine 1.1 0.5 - 1.4 mg/dL    Calcium 9.7 8.7 - 10.5 mg/dL    Total Protein 6.9 6.0 - 8.4 g/dL    Albumin 3.8 3.5 - 5.2 g/dL    Total Bilirubin 0.6 0.1 - 1.0 mg/dL    Alkaline Phosphatase 81 55 - 135 U/L    AST 23 10 - 40 U/L    ALT 31 10 - 44 U/L    eGFR >60.0 >60  mL/min/1.73 m^2    Anion Gap 8 8 - 16 mmol/L   Magnesium    Collection Time: 04/29/24  8:14 AM   Result Value Ref Range    Magnesium 1.9 1.6 - 2.6 mg/dL   PHOSPHORUS    Collection Time: 04/29/24  8:14 AM   Result Value Ref Range    Phosphorus 3.7 2.7 - 4.5 mg/dL   T4, FREE    Collection Time: 04/29/24  8:14 AM   Result Value Ref Range    Free T4 0.91 0.71 - 1.51 ng/dL   Protime-INR    Collection Time: 04/29/24  8:14 AM   Result Value Ref Range    Prothrombin Time 18.8 (H) 9.0 - 12.5 sec    INR 1.8 (H) 0.8 - 1.2   BILIRUBIN, DIRECT    Collection Time: 04/29/24  8:14 AM   Result Value Ref Range    Bilirubin, Direct 0.3 0.1 - 0.3 mg/dL   CBC W/ AUTO DIFFERENTIAL    Collection Time: 04/29/24  8:14 AM   Result Value Ref Range    WBC 3.39 (L) 3.90 - 12.70 K/uL    RBC 4.28 (L) 4.60 - 6.20 M/uL    Hemoglobin 13.0 (L) 14.0 - 18.0 g/dL    Hematocrit 40.3 40.0 - 54.0 %    MCV 94 82 - 98 fL    MCH 30.4 27.0 - 31.0 pg    MCHC 32.3 32.0 - 36.0 g/dL    RDW 14.3 11.5 - 14.5 %    Platelets 108 (L) 150 - 450 K/uL    MPV 10.6 9.2 - 12.9 fL    Immature Granulocytes 0.3 0.0 - 0.5 %    Gran # (ANC) 2.3 1.8 - 7.7 K/uL    Immature Grans (Abs) 0.01 0.00 - 0.04 K/uL    Lymph # 0.6 (L) 1.0 - 4.8 K/uL    Mono # 0.4 0.3 - 1.0 K/uL    Eos # 0.1 0.0 - 0.5 K/uL    Baso # 0.03 0.00 - 0.20 K/uL    nRBC 0 0 /100 WBC    Gran % 67.6 38.0 - 73.0 %    Lymph % 16.8 (L) 18.0 - 48.0 %    Mono % 10.3 4.0 - 15.0 %    Eosinophil % 4.1 0.0 - 8.0 %    Basophil % 0.9 0.0 - 1.9 %    Differential Method Automated    TSH    Collection Time: 04/29/24  8:14 AM   Result Value Ref Range    TSH 1.826 0.400 - 4.000 uIU/mL   CORTISOL, RANDOM    Collection Time: 04/29/24  8:14 AM   Result Value Ref Range    Cortisol 9.50 ug/dL     Imaging:    3/26/24 - Esophagram  Impression:  Postoperative change of esophagectomy with gastric pull-through.  Apparent area of luminal narrowing in the expected location of the pylorus allowing mild intermittent passage of contrast, concerning  for pyloric stricture.  Endoscopy may provide further characterization if clinically warranted.     This report was flagged in Epic as abnormal.      4/25/24 - CT CAP:  Impression:     Status post esophagectomy with gastric pull-through.  No evidence of local recurrence.     Suspected new 3 mm nodule in the left lung apex, nonspecific. Clinical and oncologic considerations will determine the role and schedule for continued surveillance.     Additional stable findings in the body of the report.       Path:   3/14/23:  Final Pathologic Diagnosis 1. LYMPH NODE, LEVEL 7, EXCISION:   One benign lymph node (0/1)   2. TOTAL THORACIC ESOPHAGECTOMY AND PROXIMAL STOMACH:   Adenocarcinoma, moderately differentiated, 3.0 cm   Margins are negative   Tumor invades adventitia   Extensive residual cancer with no evident tumor regression (poor or no   response, score 3)   Eleven benign lymph nodes (0/11)   3. RESIDUAL CONDUIT, EXCISION:   Negative for malignancy   SYNOPTIC REPORT   Procedure - Esophageal gastrectomy   Tumor site - GE Junction   Relationship of tumor to esophagogastric junction - Lesion is central at GE   junction   Tumor size - 3.0 x 3.1cm   Histologic type - Adenocarcinoma   Histologic grade - Moderately differentiated   Microscopic tumor extension - Tumor invades adventitia   Margins - All margins of resection are uninvolved, proximal, distal and   adventitial margins are negative   Treatment effect - Extensive residual cancer with no evident tumor regression   (poor or no response, score 3)   Lymphovascular invasion - Not identified   Pathologic staging - yp T3 N0 Mx   Lymph nodes examined - 12   Lymph nodes involved - 0   Additional pathologic findings - Intestinal metaplasia present   MMR-IHC has been performed on previous biopsy ([id number]) and shows all 4   antibodies intact      Assessment:       1. Esophageal adenocarcinoma    2. Esophageal dysphagia    3. Vocal cord paralysis    4. Hypertension  associated with diabetes    5. Hyperlipidemia associated with type 2 diabetes mellitus    6. Type 2 diabetes mellitus with stage 3 chronic kidney disease, without long-term current use of insulin, unspecified whether stage 3a or 3b CKD    7. Coronary artery disease involving native coronary artery of native heart without angina pectoris    8. Paroxysmal atrial fibrillation    9. HFrEF (heart failure with reduced ejection fraction)    10. Rash          Plan:     # Esophageal adenocarcinoma   Mr. Person is a pleasant 67 year old male who presents to our clinic for management of esophageal cancer. He initially presented with dysphagia, and further workup confirmed a stage II adenocarcinoma, pMMR. Tumor staged T3N0Mx by endosonographic criteria, JEVON. CT CAP on 12/14/22 confirmed no evidence of metastatic disease.    Previously conversation regarding his diagnosis and treatment options.  Recommended perioperative chemo/radiation per the CROSS trial. Discussed chemoradiation for ~5 weeks with 5 doses of weekly carboplatin and taxol administered. Plan to obtain restaging scans 4-5 weeks after radiation completed.     He was a candidate for a cooperative group trial assessing perioperative immunotherapy treatment. He consented for this study - ECOG-ACRIN [id number]: A Phase II/III Study of Dilcia-operative Nivolumab and Ipilimumab in Patients with Locoregional Esophageal and Gastroesophageal Junction Adenocarcinoma    Previously met with Dr. Santana Brown who agreed he was a surgical candidate.  Previously met with Dr. Javier Moulton who will be treating him with radiation.    Began cycle 1 carboplatin/paclitaxel/nivolumab on trial on 12/29/22.  Received cycle 4 of weekly chemotherapy on trial on 1/20/23.   We held chemotherapy for week 5 because of thrombocytopenia per protocol.    Completed radiation on 2/1/23.    Restaging PET/CT personally reviewed on 3/1/23 shows interval decreased FDG avidity in esophageal tumor, no new  sites of disease.  CT CAP 3/2/23 shows stable esophageal thickening.    Underwent robotic esophagectomy on 3/14/23 with Dr. Brown.  Pathology showed negative margins, ypT3 N0 tumor with 0 of 12 lymph nodes involved.  No treatment effect was noted on the tumor tissue.    Discussed that per the [id number] protocol will proceed with randomization to adjuvant nivolumab +/- ipilimumab. Patient randomized to receive adjuvant Nivolumab, started cycle 1 at 480 mg q4 weeks 5/1/23. Plan for twelve months per protocol.    Tolerating very well. Grade 1 pruritis.    Repeat imaging after cycle 6 shows DAVE.  Repeat imaging after cycle 13 (final cycle) shows DAVE.    Final cycle administered 4/1/24.    Underwent dilation with Dr. Brown on 5/31/23 with temporary improvement in dysphagia. Weight stable.  Still having some dysphagia so esophogram ordered which showed concern for pyloric stricture. Will refer back to Dr. Brown for repeat EGD/possible dilation.     PD-L1 CPS 30.    RTC in ~2 months per protocol.    # Vocal cord paralysis  Post-op. S/p injection by ENT.  Stable. Last evaluated 9/11/23 by ENT with improvement.    Now essentially resolved.    # HTN, HLD, DM, CKD  Following with PCP Dr. Wilson and nephrologist Dr. Oconnell.   BP managed by Dr. Nick.  BP stable in clinic today. Asymptomatic. Taking losartan and HCTZ.  Cr stable.    # CAD, A fib, CHF  Following with cardiologist Dr. Nick & EP Dr. Phillip.   Was on Xarelto. Now on warfarin. Being monitored by coumadin clinic. Labs monitored on regular basis.   Continue medical management.     # Rash  Grade I, very mild. Likely 2/2 immunotherapy.   Continue triamcinolone - symptoms controlled at this time.   Continue to monitor.     Follow up: per research.    Patient is in agreement with the proposed treatment plan. All questions were answered to the patient's satisfaction. Pt knows to call clinic if anything is needed before the next clinic visit.    Miki Medrano,  MD  Hematology/Oncology  [facility] [place]      Route Chart for Scheduling    Med Onc Chart Routing      Follow up with physician . Per research   Follow up with SAMUEL    Infusion scheduling note    Injection scheduling note    Labs    Imaging    Pharmacy appointment    Other referrals              Treatment Plan Information   [facility] [id number] ARM C ADJUVANT NIVOLUMAB STEP 2   Miki Medrano MD   Upcoming Treatment Dates - [facility] [id number] ARM C ADJUVANT NIVOLUMAB STEP 2    4/29/2024       Chemotherapy       INV nivolumab 480 mg in INV sodium chloride 0.9 % 100 mL chemo infusion    Supportive Plan Information  IV FLUIDS AND ELECTROLYTES   Miki Medrano MD   Upcoming Treatment Dates - IV FLUIDS AND ELECTROLYTES    No upcoming days in selected categories.    Therapy Plan Information  Flushes  heparin, porcine (PF) 100 unit/mL injection flush 500 Units  500 Units, Intravenous, Every visit  sodium chloride 0.9% flush 10 mL  10 mL, Intravenous, Every visit

## 2024-04-29 NOTE — TELEPHONE ENCOUNTER
Placed call to Aleyda from coumadin clinic @[phone]. Informed her pt is having EGD px and pt is on coumadin. She states she will sent a message to Melissa Kinsey the pharmacist who is managing pt coumadin therapy. Aleyda states Melissa will get back to me. Telephone number provided

## 2024-04-29 NOTE — PROGRESS NOTES
Subjective:       Patient ID: Lukasz Person is a 69 y.o. male.    Chief Complaint: Diabetes    HPI    69-year-old male here for follow-up.    Diabetes-diet and exercise control.  He checks his BG in the am.  They run low 100s (100-114 avg).  Lab Results   Component Value Date    HGBA1C 6.8 (H) 04/01/2024    HGBA1C 6.2 (H) 01/08/2024    HGBA1C 6.2 (H) 05/29/2023     Lab Results   Component Value Date    LDLCALC 43.8 (L) 04/17/2024    CREATININE 1.2 04/04/2024     HTN -  Patient is currently on HCTZ 25 mg, losartan 25 mg, Toprol-XL 25 mg. He does check his BP at home, and it runs 120s/60s. Side effects of medications note: none. Denies headaches, blurred vision, chest pain, shortness of breath, nausea.    HLD - Patient is currently on Crestor 20 mg.  His last lipid panel was   Cholesterol   Date Value Ref Range Status   04/17/2024 112 (L) 120 - 199 mg/dL Final     Comment:     The National Cholesterol Education Program (NCEP) has set the  following guidelines (reference ranges) for Cholesterol:  Optimal.....................<200 mg/dL  Borderline High.............200-239 mg/dL  High........................> or = 240 mg/dL       Triglycerides   Date Value Ref Range Status   04/17/2024 71 30 - 150 mg/dL Final     Comment:     The National Cholesterol Education Program (NCEP) has set the  following guidelines (reference values) for triglycerides:  Normal......................<150 mg/dL  Borderline High.............150-199 mg/dL  High........................200-499 mg/dL       HDL   Date Value Ref Range Status   04/17/2024 54 40 - 75 mg/dL Final     Comment:     The National Cholesterol Education Program (NCEP) has set the  following guidelines (reference values) for HDL Cholesterol:  Low...............<40 mg/dL  Optimal...........>60 mg/dL       LDL Cholesterol   Date Value Ref Range Status   04/17/2024 43.8 (L) 63.0 - 159.0 mg/dL Final     Comment:     The National Cholesterol Education Program (NCEP) has set  the  following guidelines (reference values) for LDL Cholesterol:  Optimal.......................<130 mg/dL  Borderline High...............130-159 mg/dL  High..........................160-189 mg/dL  Very High.....................>190 mg/dL     .  Side effects of the medication: none.    Review of Systems      Objective:      Physical Exam  Vitals reviewed.   Constitutional:       Appearance: He is well-developed.   HENT:      Head: Normocephalic and atraumatic.      Mouth/Throat:      Pharynx: No oropharyngeal exudate.   Eyes:      General: No scleral icterus.        Right eye: No discharge.         Left eye: No discharge.      Pupils: Pupils are equal, round, and reactive to light.   Neck:      Thyroid: No thyromegaly.      Trachea: No tracheal deviation.   Cardiovascular:      Rate and Rhythm: Normal rate and regular rhythm.      Heart sounds: Normal heart sounds. No murmur heard.     No friction rub. No gallop.   Pulmonary:      Effort: Pulmonary effort is normal. No respiratory distress.      Breath sounds: Normal breath sounds. No wheezing or rales.   Chest:      Chest wall: No tenderness.   Abdominal:      General: Bowel sounds are normal. There is no distension.      Palpations: Abdomen is soft. There is no mass.      Tenderness: There is no abdominal tenderness. There is no guarding or rebound.   Musculoskeletal:      Right shoulder: Decreased range of motion.      Left shoulder: Tenderness present. Decreased range of motion.      Cervical back: Normal range of motion and neck supple.   Skin:     General: Skin is warm and dry.      Coloration: Skin is not pale.      Findings: No erythema or rash.   Neurological:      Mental Status: He is alert and oriented to person, place, and time.   Psychiatric:         Behavior: Behavior normal.         Assessment:       1. Type 2 diabetes mellitus without complication, without long-term current use of insulin    2. DM type 2 without retinopathy    3. Diabetes mellitus  with cataract    4. Hyperlipidemia associated with type 2 diabetes mellitus    5. Essential hypertension    6. HFrEF (heart failure with reduced ejection fraction)    7. Paroxysmal atrial fibrillation    8. Traumatic tear of left rotator cuff, unspecified tear extent, initial encounter  - Ambulatory referral/consult to Physical/Occupational Therapy; Future    9. Adhesive capsulitis of left shoulder  - Ambulatory referral/consult to Physical/Occupational Therapy; Future      Plan:       1/2/3.  Diet and exercise controlled.    4.  Continue Crestor 40 mg p.o.  5.  Continue losartan 25 mg, Toprol-XL 12.5 mg, HCTZ 25 mg p.o.   6.  Monitor.    7. Continue Coumadin, Toprol-XL 12.5 mg.  8/9.  Refer to physical therapy

## 2024-04-29 NOTE — PROGRESS NOTES
Clinic notified that patient is trying to have an EGD scheduled for 5/3 and GI clinic wanting to confirm that patient can hold warfarin and have appropriate alejandro-procedural planning in time for 5/3. Discussed with MARY Medina that INR today was subtherapeutic so patient may hold for 4 days prior to procedure and he will not need to be bridged with Lovenox due to a CHADsVASC score of 5 (moderate risk).    Plan to hold warfarin 4/29-5/2.

## 2024-04-29 NOTE — PROGRESS NOTES
[facility]    04/29/2024 12:07 PM    Lukasz Person (69 y.o.) is followed by the [facility].      Assessment/Plan:    Lukasz Person presents today with subtherapeutic  INR. Goal INR 2.0-3.0    Assessment of patient findings per MA/LPN and chart review:   The following significant findings were found:  None    Recommendation for patient's warfarin regimen:   Weekly warfarin dose increased due to repeated subtherapeutic INRs.    Recommended repeat INR in 1 week      Melissa Kinsey, PharmD, BCPS  Clinical Pharmacist - Coumadin Clinic  Preferred Contact: Secure Messaging or In Basket Message

## 2024-04-29 NOTE — PROGRESS NOTES
Patient advised on warfarin per calendar. Patient expressed understanding of instructions. Instructions also sent via portal. INR scheduled for 5/9/24.

## 2024-04-29 NOTE — TELEPHONE ENCOUNTER
Place call to pt. Informed pt his EGD sx is luis manuel for Friday 5/3/24. Informed pt a RN will give him a call 1-2 days before sx date for sx time. Pre op instruction and facility address provided.   Informed pt per Kelsie Gillies pharmacist from coumadin clinic pt may hold his coumadin 4 days before sx date starting tonight and pt may resume  coumadin after his sx.     Pt verbalized understanding all of the above. Pt questions and concerns were addressed. Informed pt if he has further questions and concerns to call our office. Telephone number provided.

## 2024-04-29 NOTE — PROGRESS NOTES
: URIEL Manriquez MD  Treating Investigators: NIRAV Medrano MD  Surgical Oncologist: SARAH Brown M.D. 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 Patient queried & verbalized his willingness to continue his participation on the above-mentioned trial. Patient queried & reports ongoing mild fatigue and dysphagia. Patient has his EGD scheduled on 03May2024. Patient also reports starting Losartan & HCTZ on 02Mar2024. Patient queried & denies any additional changes to his health and ConMeds.    Patient completed safety labs, VS, PE & ECOG (ECOG = 0, per Dr. Medrano) today per protocol. Patient's study labs were inadvertently not collected at the same time, and study labs were unable to be collected at this visit. Dr. Medrano assessed all patient's labs, VS & PE findings as either WNL or NCS.    Patient will RTC for Day 100 follow-up as outlined below, then every 3 months until April 2026. Patient was encouraged to contact [facility] or Dr. Medrano's team with any questions or concerns & was reminded of clinic's 24/7 emergency contact number. Patient verbalized understanding & denies any additional questions at this time.        Step 2 -- 100 Day FU - [id number]:  Labs @ 0820 -- BCC 3rd floor  Marcela @ 0930 -- CICT        Solicited AEs -- Assessed [id number]:  ** Anemia - Grade 1 -- 30Jan2024 - ongoing -- (NCS per MD)  Platelet count decreased - Grade 0   ** White blood cell count decreased - Grade 1 - 01Apr2024 - ongoing (NCS per MD) -- RESTARTED  01APR2024  Neutrophil count decreased - Grade 0  ** Lymphocyte count decreased - Grade 3 -- 01Apr2024 - ongoing (NCS per MD) -- RESTARTED 01APR2024  Peripheral motor neuropathy - Grade 0   Peripheral sensory neuropathy - Grade 0   Creatinine increased - Grade 0  Hypothyroidism - Grade 0   (TSH WNL on 16Oct2023)  Diarrhea (patient baseline BM = 2 stools a day) - Grade 0 -- reports loose stools vs. diarrhea   **Fatigue - Grade 1 -- 16Oct2023 - ongoing (NCS per MD)  **Nausea - Grade 1 - 21Apr2023 - ongoing  (NCS per MD)  **Cough - Grade 1  -- Medical History  Pneumonitis - Grade 0  Hyperglycemia - Grade 0  Adrenal Insufficiency - Grade 0 -- 16Oct2023 ACTH & cortisol WNL  **Rash-maculo-papular - Grade 1 - 17Jul2023 - ongoing ( used Sarna [camphor 0.5% - menthol 0.5&] PRN -- Prescribed triamcinolone 24Jul2023 ) -- (NCS per MD)  **Pruritis - Grade 1 - 17Jul2023 - ongoing -- (NCS per MD)  Erythema multiforme - Grade 0  Vomiting - Grade 0    Lipase increased -- Not required per protocol and therefore not assessed this visit.      Ongoing Non-Solicited AEs:  Hoarseness - Grade 2 - 14Mar2023 - ongoing ( no treatment )  Weight loss - Grade 2 - 24Jul2023 - ongoing ( no treatment )  Dysphagia - Grade 2 - 26Feb2024 - ongoing ( no treatment ) -- Esophagram performed 26Mar2024, no treatment changes at this time    New or Changed Non-Solicited AEs Since Last Visit:  None           Complete Baseline Medical History, Adverse Events, and Concomitant Medications are located in patient's shadow chart

## 2024-04-30 ENCOUNTER — CLINICAL SUPPORT (OUTPATIENT)
Dept: REHABILITATION | Facility: HOSPITAL | Age: 69
End: 2024-04-30
Payer: MEDICARE

## 2024-04-30 DIAGNOSIS — M75.02 ADHESIVE CAPSULITIS OF LEFT SHOULDER: ICD-10-CM

## 2024-04-30 DIAGNOSIS — S46.012A TRAUMATIC TEAR OF LEFT ROTATOR CUFF, UNSPECIFIED TEAR EXTENT, INITIAL ENCOUNTER: ICD-10-CM

## 2024-04-30 DIAGNOSIS — C15.9 ESOPHAGEAL ADENOCARCINOMA: Primary | ICD-10-CM

## 2024-04-30 LAB — ACTH PLAS-MCNC: 20 PG/ML (ref 0–46)

## 2024-04-30 PROCEDURE — 97140 MANUAL THERAPY 1/> REGIONS: CPT

## 2024-04-30 PROCEDURE — 97110 THERAPEUTIC EXERCISES: CPT

## 2024-04-30 NOTE — PROGRESS NOTES
[facility]  Occupational Therapy RE Eval note       Date: 3/28/2024  Name: Lukasz Person  Clinic Number: [id number]        Therapy Diagnosis: left shoulder pain         Physician: Kirk Wilson MD     Physician Orders:  eval and treat   Medical Diagnosis: S46.012A (ICD-10-CM) - Traumatic tear of left rotator cuff, unspecified tear extent, initial encounter   Surgical Procedure/ Date :reports no shoulder sx   Evaluation Date: 1/25/2024  Insurance:Medicare   Insurance Authorization period Expiration: 1/21/25   Plan of Care Certification Period: 4/30/24      Visit # / Visits Authortized: 6 / 20   Time In:1;00 PM   Time Out:2;00 pm    Total Billable Time: 60 minutes        Precautions: Standard, needs to have head elevated in supine due to esophageal cancer and reconstruction      Subjective      Patient reports: reports the pain was good til he went doing a lot of work in his garage .   He was compliant with home exercise program given last session.   Response to previous treatment: first tx  Functional change: none to be noted to this date      Pain: 0/10  Location: left shoulder       Objective      Objective Measures updated at progress report unless specified.     Active Range of Motion: Measured in degrees       Shoulder Right Left  Left 2/29/24 3/28/24 left     Flexion 155 130 160  160   Abduction 130 130 150 155   ER at 0 80 60 60 60   ER at 90 60  60  60 60   IR 60 60 T 12  T 12          Shoulder Right   Shoulder flexion: 4/5   Shoulder Abduction: 4/5   Shoulder ER 4/5   Shoulder IR 4/5   Lower Trap 5/5   Middle Trap 5/5   Rhomboids 5/5         Scapular Control/Dyskinesis:     Normal / Subtle / Obvious  Comments    Left  Protracted      Right  Protracted       He reports that he does sleep         Palpation Shoulder:     Lateral shoulder pain              CMS Impairment/Limitation/Restriction for FOTO initial  Survey     Therapist reviewed FOTO scores for Lukasz Person on 1/25/2024.    FOTO documents entered into [email or web address] - see Media section.      17 intake score             Treatment      Lukasz received the treatments listed below:     Hot pack to left shoulder X 10 minutes       Manual  therapy X 10 minutes Shoulder approximation with isometrics         therapeutic exercises to develop strength for 30   minutes including:  -  Red t band alcon  ( rows) , Er with red band , IR  with red band   and shoulder adduction  20 reps ( added to HEP )     Brueggers red band    Next visit  shoulder stabilization with alphabet  on wall and body blade             Patient Education and Home Exercises      Education provided:   - added table slides with HEP   - Progress towards goals      Written Home Exercises Provided: Patient instructed to cont prior HEP.  Exercises were reviewed and Lukasz was able to demonstrate them prior to the end of the session.  Lukasz demonstrated good  understanding of the home exercise program provided. See electronic medical record under Patient Instructions for exercises provided during therapy sessions.       Assessment      Good debra today. Good ROM slight pain  ,  Pt reporting increased improvement only slight anterior shoulder pain    Lukasz is progressing well towards his goals and there are no updates to goals at this time. Pt prognosis is Good.      Patient will continue to benefit from skilled outpatient occupational therapy to address the deficits listed in the problem list on initial evaluation provide patient/family education and to maximize patient's level of independence in the home and community environment.      Patient's spiritual, cultural and educational needs considered and patient agreeable to plan of care and goals.        Anticipated barriers to occupational therapy:      Goals:  Short Term (4  weeks on 6/30/24 ):  1)   Patient to be IND with HEP and modalities for pain management  2)   Increase ROM 15 degrees in left shoulder motion to increase  functional UE use for  shoulder flexion, scaption and ER   3)  Improve muscle strength ing 4/+5 by shoulder motions  in order to carry items with left shoulder .      Long Term (by discharge):  1)   Pt will report 0 out of 10 pain with left shoulder . Reaching forward or overhead into closets .  2)   Patient to increase score by  10 on FOTO to demonstrate improved perception of functional left use.  3)   Pt will return to prior level of function for ADLs and household management.      Plan      Updates/Grading for next session:      Sudha Murillo OT

## 2024-05-01 ENCOUNTER — TELEPHONE (OUTPATIENT)
Dept: SURGERY | Facility: CLINIC | Age: 69
End: 2024-05-01
Payer: MEDICARE

## 2024-05-01 NOTE — TELEPHONE ENCOUNTER
Placed call to pt. Informed pt his sx is conformed for Friday 5/3/24 sx time 7 am and pt has to check in at the hospital   at 5 am.   Pre op instructions and facility address provided. Pt states he had hold his warfarin since Monday.   Informed pt if he has further questions or concerns to call our office. Telephone number provided.

## 2024-05-03 ENCOUNTER — ANESTHESIA (OUTPATIENT)
Dept: SURGERY | Facility: HOSPITAL | Age: 69
End: 2024-05-03
Payer: MEDICARE

## 2024-05-03 ENCOUNTER — HOSPITAL ENCOUNTER (OUTPATIENT)
Facility: HOSPITAL | Age: 69
Discharge: HOME | End: 2024-05-03
Attending: STUDENT IN AN ORGANIZED HEALTH CARE EDUCATION/TRAINING PROGRAM | Admitting: STUDENT IN AN ORGANIZED HEALTH CARE EDUCATION/TRAINING PROGRAM
Payer: MEDICARE

## 2024-05-03 ENCOUNTER — ANESTHESIA EVENT (OUTPATIENT)
Dept: SURGERY | Facility: HOSPITAL | Age: 69
End: 2024-05-03
Payer: MEDICARE

## 2024-05-03 VITALS
HEIGHT: 72 IN | OXYGEN SATURATION: 100 % | TEMPERATURE: 98 F | SYSTOLIC BLOOD PRESSURE: 142 MMHG | BODY MASS INDEX: 26.78 KG/M2 | RESPIRATION RATE: 16 BRPM | DIASTOLIC BLOOD PRESSURE: 71 MMHG | WEIGHT: 197.75 LBS | HEART RATE: 43 BPM

## 2024-05-03 DIAGNOSIS — C15.9 ESOPHAGEAL ADENOCARCINOMA: Primary | ICD-10-CM

## 2024-05-03 LAB
POCT GLUCOSE: 111 MG/DL (ref 70–110)
POCT GLUCOSE: 124 MG/DL (ref 70–110)

## 2024-05-03 PROCEDURE — 82962 GLUCOSE BLOOD TEST: CPT | Mod: 91 | Performed by: STUDENT IN AN ORGANIZED HEALTH CARE EDUCATION/TRAINING PROGRAM

## 2024-05-03 PROCEDURE — D9220A PRA ANESTHESIA: Mod: ANES,,, | Performed by: ANESTHESIOLOGY

## 2024-05-03 PROCEDURE — D9220A PRA ANESTHESIA: Mod: CRNA,,, | Performed by: NURSE ANESTHETIST, CERTIFIED REGISTERED

## 2024-05-03 PROCEDURE — 63600175 PHARM REV CODE 636 W HCPCS: Mod: JZ,JG | Performed by: STUDENT IN AN ORGANIZED HEALTH CARE EDUCATION/TRAINING PROGRAM

## 2024-05-03 PROCEDURE — 36000706: Performed by: STUDENT IN AN ORGANIZED HEALTH CARE EDUCATION/TRAINING PROGRAM

## 2024-05-03 PROCEDURE — 36000707: Performed by: STUDENT IN AN ORGANIZED HEALTH CARE EDUCATION/TRAINING PROGRAM

## 2024-05-03 PROCEDURE — 37000008 HC ANESTHESIA 1ST 15 MINUTES: Performed by: STUDENT IN AN ORGANIZED HEALTH CARE EDUCATION/TRAINING PROGRAM

## 2024-05-03 PROCEDURE — 43236 UPPR GI SCOPE W/SUBMUC INJ: CPT | Mod: ,,, | Performed by: STUDENT IN AN ORGANIZED HEALTH CARE EDUCATION/TRAINING PROGRAM

## 2024-05-03 PROCEDURE — 25000003 PHARM REV CODE 250: Performed by: NURSE ANESTHETIST, CERTIFIED REGISTERED

## 2024-05-03 PROCEDURE — 71000044 HC DOSC ROUTINE RECOVERY FIRST HOUR: Performed by: STUDENT IN AN ORGANIZED HEALTH CARE EDUCATION/TRAINING PROGRAM

## 2024-05-03 PROCEDURE — 63600175 PHARM REV CODE 636 W HCPCS: Performed by: NURSE ANESTHETIST, CERTIFIED REGISTERED

## 2024-05-03 PROCEDURE — 37000009 HC ANESTHESIA EA ADD 15 MINS: Performed by: STUDENT IN AN ORGANIZED HEALTH CARE EDUCATION/TRAINING PROGRAM

## 2024-05-03 PROCEDURE — 82962 GLUCOSE BLOOD TEST: CPT | Performed by: STUDENT IN AN ORGANIZED HEALTH CARE EDUCATION/TRAINING PROGRAM

## 2024-05-03 PROCEDURE — 71000015 HC POSTOP RECOV 1ST HR: Performed by: STUDENT IN AN ORGANIZED HEALTH CARE EDUCATION/TRAINING PROGRAM

## 2024-05-03 RX ORDER — LIDOCAINE HYDROCHLORIDE 20 MG/ML
INJECTION INTRAVENOUS
Status: DISCONTINUED | OUTPATIENT
Start: 2024-05-03 | End: 2024-05-03

## 2024-05-03 RX ORDER — FENTANYL CITRATE 50 UG/ML
25 INJECTION, SOLUTION INTRAMUSCULAR; INTRAVENOUS EVERY 5 MIN PRN
Status: DISCONTINUED | OUTPATIENT
Start: 2024-05-03 | End: 2024-05-03 | Stop reason: HOSPADM

## 2024-05-03 RX ORDER — ROCURONIUM BROMIDE 10 MG/ML
INJECTION, SOLUTION INTRAVENOUS
Status: DISCONTINUED | OUTPATIENT
Start: 2024-05-03 | End: 2024-05-03

## 2024-05-03 RX ORDER — PROPOFOL 10 MG/ML
VIAL (ML) INTRAVENOUS
Status: DISCONTINUED | OUTPATIENT
Start: 2024-05-03 | End: 2024-05-03

## 2024-05-03 RX ORDER — MIDAZOLAM HYDROCHLORIDE 1 MG/ML
INJECTION INTRAMUSCULAR; INTRAVENOUS
Status: DISCONTINUED | OUTPATIENT
Start: 2024-05-03 | End: 2024-05-03

## 2024-05-03 RX ORDER — ONDANSETRON HYDROCHLORIDE 2 MG/ML
INJECTION, SOLUTION INTRAVENOUS
Status: DISCONTINUED | OUTPATIENT
Start: 2024-05-03 | End: 2024-05-03

## 2024-05-03 RX ORDER — HALOPERIDOL 5 MG/ML
0.5 INJECTION INTRAMUSCULAR EVERY 10 MIN PRN
Status: DISCONTINUED | OUTPATIENT
Start: 2024-05-03 | End: 2024-05-03 | Stop reason: HOSPADM

## 2024-05-03 RX ORDER — FENTANYL CITRATE 50 UG/ML
INJECTION, SOLUTION INTRAMUSCULAR; INTRAVENOUS
Status: DISCONTINUED | OUTPATIENT
Start: 2024-05-03 | End: 2024-05-03

## 2024-05-03 RX ADMIN — PROPOFOL 50 MG: 10 INJECTION, EMULSION INTRAVENOUS at 07:05

## 2024-05-03 RX ADMIN — ROCURONIUM BROMIDE 40 MG: 10 INJECTION, SOLUTION INTRAVENOUS at 07:05

## 2024-05-03 RX ADMIN — PROPOFOL 150 MG: 10 INJECTION, EMULSION INTRAVENOUS at 07:05

## 2024-05-03 RX ADMIN — MIDAZOLAM HYDROCHLORIDE 1 MG: 2 INJECTION, SOLUTION INTRAMUSCULAR; INTRAVENOUS at 06:05

## 2024-05-03 RX ADMIN — ONDANSETRON 4 MG: 2 INJECTION INTRAMUSCULAR; INTRAVENOUS at 07:05

## 2024-05-03 RX ADMIN — LIDOCAINE HYDROCHLORIDE 80 MG: 20 INJECTION INTRAVENOUS at 07:05

## 2024-05-03 RX ADMIN — SUGAMMADEX 200 MG: 100 INJECTION, SOLUTION INTRAVENOUS at 07:05

## 2024-05-03 RX ADMIN — SODIUM CHLORIDE: 0.9 INJECTION, SOLUTION INTRAVENOUS at 06:05

## 2024-05-03 RX ADMIN — ROCURONIUM BROMIDE 10 MG: 10 INJECTION, SOLUTION INTRAVENOUS at 07:05

## 2024-05-03 RX ADMIN — FENTANYL CITRATE 75 MCG: 50 INJECTION, SOLUTION INTRAMUSCULAR; INTRAVENOUS at 07:05

## 2024-05-03 RX ADMIN — SUGAMMADEX 400 MG: 100 INJECTION, SOLUTION INTRAVENOUS at 07:05

## 2024-05-03 NOTE — TRANSFER OF CARE
Anesthesia Transfer of Care Note    Patient: Lukasz Person    Procedure(s) Performed: Procedure(s) (LRB):  EGD (ESOPHAGOGASTRODUODENOSCOPY) with botox injection (N/A)    Patient location: [facility]    Anesthesia Type: general    Transport from OR: Transported from OR on 6-10 L/min O2 by face mask with adequate spontaneous ventilation    Post pain: adequate analgesia    Post assessment: no apparent anesthetic complications and tolerated procedure well    Post vital signs: stable    Level of consciousness: sedated and responds to stimulation    Nausea/Vomiting: no nausea/vomiting    Complications: none    Transfer of care protocol was followed      Last vitals: Visit Vitals  BP (!) 140/64 (BP Location: Right arm, Patient Position: Lying)   Pulse (!) 47   Temp 36.9 °C (98.4 °F) (Temporal)   Resp 16   Ht 6' (1.829 m)   Wt 89.7 kg (197 lb 12 oz)   SpO2 99%   BMI 26.82 kg/m²

## 2024-05-03 NOTE — BRIEF OP NOTE
[facility] - Surgery ([facility])  Brief Operative Note    Surgery Date: 5/3/2024     Surgeons and Role:     * Santana Brown Jr., MD - Primary     * Rozina Santiago MD - Resident - Assisting        Pre-op Diagnosis:  Esophageal adenocarcinoma [C15.9]    Post-op Diagnosis:  Post-Op Diagnosis Codes:     * Esophageal adenocarcinoma [C15.9]    Procedure(s) (LRB):  EGD (ESOPHAGOGASTRODUODENOSCOPY) with botox injection (N/A)    Anesthesia: General    Operative Findings: EGD performed. No stricture identified. 100u Botox injected to pylorus.    Estimated Blood Loss: * No values recorded between 5/3/2024  7:11 AM and 5/3/2024  7:30 AM *         Specimens:   Specimen (24h ago, onward)      None              Discharge Note    OUTCOME: Patient tolerated treatment/procedure well without complication and is now ready for discharge.    DISPOSITION: Home or Self Care    FINAL DIAGNOSIS:  <principal problem not specified>    FOLLOWUP: With primary care provider    DISCHARGE INSTRUCTIONS:    Discharge Procedure Orders   Lifting restrictions   Order Comments: No lifting greater than 10 pounds for 6 weeks from day of surgery.  No pushing/pulling such as vacuuming or raking.  No straining, avoid constipation and take stool softeners as described and laxatives as needed.  No driving while on narcotics and until you can react quickly without pain.     No driving until:   Order Comments: No driving until you are finished taking narcotic pain medications     No dressing needed   Order Comments: Ok to shower tomorrow. Do not submerge incision in bath or pool for 2 weeks.     Notify your health care provider if you experience any of the following:  temperature >100.4     Notify your health care provider if you experience any of the following:  persistent nausea and vomiting or diarrhea     Notify your health care provider if you experience any of the following:  severe uncontrolled pain     Notify your health care provider if you experience  any of the following:  redness, tenderness, or signs of infection (pain, swelling, redness, odor or green/yellow discharge around incision site)     Notify your health care provider if you experience any of the following:  difficulty breathing or increased cough     Notify your health care provider if you experience any of the following:  severe persistent headache     Notify your health care provider if you experience any of the following:  worsening rash     Notify your health care provider if you experience any of the following:  persistent dizziness, light-headedness, or visual disturbances     Notify your health care provider if you experience any of the following:  increased confusion or weakness     Activity as tolerated

## 2024-05-03 NOTE — ANESTHESIA PREPROCEDURE EVALUATION
05/03/2024  Lukasz Person is a 69 y.o., male.    Pre-operative evaluation for Procedure(s) (LRB):  EGD (ESOPHAGOGASTRODUODENOSCOPY) (N/A)    Lukasz Person is a 69 y.o. male     LDA:     Prev airway:     Drips:     Patient Active Problem List   Diagnosis    KUMAR on CPAP    Gout    Essential hypertension    Hyperparathyroidism, primary    Hyperlipidemia associated with type 2 diabetes mellitus    Obesity, diabetes, and hypertension syndrome    DM type 2 without retinopathy    Diabetes mellitus with cataract    Male hypogonadism    Coronary artery disease involving native coronary artery of native heart    Low serum alkaline phosphatase    BPH with urinary obstruction    Erectile dysfunction due to arterial insufficiency    Paroxysmal atrial fibrillation    Chronic anticoagulation    HFrEF (heart failure with reduced ejection fraction)    Non-ischemic cardiomyopathy    Esophageal adenocarcinoma    Aortic atherosclerosis    Left true vocal fold immobility and bowing    Immunodeficiency secondary to neoplasm    Port-A-Cath in place    Moderate malnutrition    Physical deconditioning    Stress and adjustment reaction    Imbalance    Thrombocytopenia    Type 2 diabetes mellitus without complication, without long-term current use of insulin    Chemotherapy-induced neuropathy    Traumatic tear of left rotator cuff    Left shoulder pain       Review of patient's allergies indicates:  No Known Allergies     No current facility-administered medications on file prior to encounter.     Current Outpatient Medications on File Prior to Encounter   Medication Sig Dispense Refill    allopurinoL (ZYLOPRIM) 100 MG tablet TAKE 1 TABLET BY MOUTH EVERY DAY 30 tablet 10    hydroCHLOROthiazide (HYDRODIURIL) 25 MG tablet Take 1 tablet (25 mg total) by mouth once daily. 30 tablet 11    losartan (COZAAR) 25 MG tablet Take 1 tablet (25 mg  total) by mouth 2 (two) times a day. 180 tablet 3    metoprolol succinate (TOPROL-XL) 25 MG 24 hr tablet Take 0.5 tablets (12.5 mg total) by mouth once daily. 15 tablet 11    omeprazole (PRILOSEC) 40 MG capsule TAKE 1 CAPSULE(40 MG) BY MOUTH EVERY DAY 90 capsule 3    rosuvastatin (CRESTOR) 20 MG tablet Take 1 tablet (20 mg total) by mouth every evening. 90 tablet 3    tamsulosin (FLOMAX) 0.4 mg Cap TAKE 1 CAPSULE(0.4 MG) BY MOUTH EVERY DAY 30 capsule 11    testosterone (ANDROGEL) 20.25 mg/1.25 gram (1.62 %) GlPm Apply 4 pumps to shoulders daily 2 each 5    triamcinolone acetonide 0.1% (KENALOG) 0.1 % cream Apply topically 2 (two) times daily. 45 g 2    blood sugar diagnostic (ACCU-CHEK ANTONELLA PLUS TEST STRP) Strp Use to test CBG four times daily E11.65 400 strip 3    blood-glucose meter kit Checks blood sugars 1x/daily. 1 each 12    clotrimazole (LOTRIMIN) 1 % cream Apply topically once daily. for 14 days 45 g 0    lancets (ACCU-CHEK SOFTCLIX LANCETS) Misc USE 1 EVERY DAY OR AS DIRECTED 100 each 3    nitroGLYCERIN (NITROSTAT) 0.4 MG SL tablet Place 1 tablet (0.4 mg total) under the tongue every 5 (five) minutes as needed for Chest pain. If you need a third tablet, call 911 100 tablet 3    warfarin (COUMADIN) 4 MG tablet 6mg PO Sun, Tues, Thurs and 4mg PO all other days -- OR AS DIRECTED BY COUMADIN CLINIC         Past Surgical History:   Procedure Laterality Date    COLONOSCOPY N/A 10/26/2023    Procedure: COLONOSCOPY;  Surgeon: Noah Duong MD;  Location: [facility] ([address] [place] [address]);  Service: Endoscopy;  Laterality: N/A;  instructions sent to patient portal. Tb  referral: Dr. Wilson  Pt. on Xarelto--tb-ok to hold see te 8/15/23 dr vu  10/13-precall complete-MS  10/19 pt rescheduled, Xarelto hold, PEG, portal -ml  10/24-precall complete-KPvt    CORONARY ANGIOGRAPHY N/A 9/30/2020    Procedure: ANGIOGRAM, CORONARY ARTERY;  Surgeon: John West MD;  Location: [facility] CATH LAB;  Service: Cardiology;   Laterality: N/A;    CYSTOSCOPY N/A 2/17/2022    Procedure: CYSTOSCOPY;  Surgeon: Lukasz Hughes MD;  Location: [facility] OR 1ST FLR;  Service: Urology;  Laterality: N/A;    CYSTOSCOPY W/ URETERAL STENT PLACEMENT N/A 2/25/2022    Procedure: CYSTOSCOPY, WITH URETERAL STENT INSERTION;  Surgeon: Lukasz Hughes MD;  Location: [facility] OR 1ST FLR;  Service: Urology;  Laterality: N/A;    ENDOSCOPIC ULTRASOUND OF UPPER GASTROINTESTINAL TRACT N/A 12/7/2022    Procedure: ULTRASOUND, UPPER GI TRACT, ENDOSCOPIC;  Surgeon: Darian Main MD;  Location: [facility] ENDO;  Service: Endoscopy;  Laterality: N/A;  Approval to hold Xarelto rec'd from Dr. Nick (see t/e 12/5/22)-DS    ESOPHAGOGASTRODUODENOSCOPY N/A 11/17/2022    Procedure: EGD (ESOPHAGOGASTRODUODENOSCOPY);  Surgeon: Brody Gonzales MD;  Location: [facility] ENDO (2ND FLR);  Service: Endoscopy;  Laterality: N/A;  inst via email-ok to hold Xarelto x 2 days-MS    ESOPHAGOGASTRODUODENOSCOPY N/A 5/31/2023    Procedure: EGD (ESOPHAGOGASTRODUODENOSCOPY) WITH DILATION;  Surgeon: Santana Brown Jr., MD;  Location: [facility] OR 2ND FLR;  Service: General;  Laterality: N/A;    LASER LITHOTRIPSY Left 2/25/2022    Procedure: LITHOTRIPSY, USING LASER;  Surgeon: Lukasz Hughes MD;  [facility];  Service: Urology;  Laterality: Left;    LEFT HEART CATHETERIZATION Left 9/30/2020    Procedure: Left heart cath;  Surgeon: John West MD;  Location: [facility] CATH LAB;  Service: Cardiology;  Laterality: Left;    LITHOTRIPSY      PARATHYROIDECTOMY  1/1/2-107    PYELOSCOPY Left 2/25/2022    Procedure: PYELOSCOPY;  Surgeon: Lukasz Hughes MD;  Location: [facility] OR 1ST FLR;  Service: Urology;  Laterality: Left;    ROBOT-ASSISTED SURGICAL REMOVAL OF ESOPHAGUS USING DA CARLOS XI N/A 3/14/2023    Procedure: XI ROBOTIC ESOPHAGECTOMY;  Surgeon: Santana Brown Jr., MD;  Location: [facility] OR [address] [place] [address];  Service: General;  Laterality: N/A;  Abdomen, Chest and Neck    ROBOTIC JEJUNOSTOMY N/A  3/14/2023    Procedure: ROBOTIC JEJUNOSTOMY TUBE INSERTION;  Surgeon: Santana Brown Jr., MD;  Location: [facility] OR [facility] FLR;  Service: General;  Laterality: N/A;    TRANSESOPHAGEAL ECHOCARDIOGRAPHY N/A 2022    Procedure: ECHOCARDIOGRAM, TRANSESOPHAGEAL;  Surgeon: Xander Diagnostic Provider;  Location: [facility] EP LAB;  Service: Cardiology;  Laterality: N/A;    TREATMENT OF CARDIAC ARRHYTHMIA N/A 2022    Procedure: Cardioversion or Defibrillation;  Surgeon: Iris Fisher NP;  Location: [facility] EP LAB;  Service: Cardiology;  Laterality: N/A;  afib, dccv, artie, anes, EH, 3prep    URETEROSCOPIC REMOVAL OF URETERIC CALCULUS Left 2022    Procedure: REMOVAL, CALCULUS, URETER, URETEROSCOPIC;  Surgeon: Lukasz Hughes MD;  [facility];  Service: Urology;  Laterality: Left;    URETEROSCOPY Left 2022    Procedure: URETEROSCOPY;  Surgeon: Lukasz Hughes MD;  [facility];  Service: Urology;  Laterality: Left;    VOCAL CORD INJECTION Left 3/17/2023    Procedure: INJECTION, VOCAL CORD, LARYNGOSCOPIC;  Surgeon: Gm Altman MD;  [facility];  Service: ENT;  Laterality: Left;       Social History     Socioeconomic History    Marital status:      Spouse name: Amanda   Tobacco Use    Smoking status: Former     Current packs/day: 0.00     Average packs/day: 1 pack/day for 22.7 years (22.7 ttl pk-yrs)     Types: Cigarettes, Cigars     Start date: 1972     Quit date:      Years since quittin.9     Passive exposure: Never    Smokeless tobacco: Never    Tobacco comments:     smoking was off and on.  cumulative 7 years.  never more than three years in one stretch or more lj   Substance and Sexual Activity    Alcohol use: Yes     Alcohol/week: 4.0 standard drinks of alcohol     Types: 4 Shots of liquor per week    Drug use: Not Currently     Types: Marijuana    Sexual activity: Not Currently     Partners: Female     Birth control/protection: None      "Comment:      Social Determinants of Health     Financial Resource Strain: Low Risk  (2024)    Overall Financial Resource Strain (CARDIA)     Difficulty of Paying Living Expenses: Not very hard   Food Insecurity: No Food Insecurity (2024)    Hunger Vital Sign     Worried About Running Out of Food in the Last Year: Never true     Ran Out of Food in the Last Year: Never true   Recent Concern: Food Insecurity - Food Insecurity Present (2023)    Hunger Vital Sign     Worried About Running Out of Food in the Last Year: Sometimes true     Ran Out of Food in the Last Year: Never true   Transportation Needs: No Transportation Needs (2024)    PRAPARE - Transportation     Lack of Transportation (Medical): No     Lack of Transportation (Non-Medical): No   Physical Activity: Insufficiently Active (2024)    Exercise Vital Sign     Days of Exercise per Week: 4 days     Minutes of Exercise per Session: 30 min   Stress: No Stress Concern Present (2024)    [employer] - Occupational Stress Questionnaire     Feeling of Stress : Only a little   Housing Stability: Low Risk  (2024)    Housing Stability Vital Sign     Unable to Pay for Housing in the Last Year: No     Number of Places Lived in the Last Year: 1     Unstable Housing in the Last Year: No         Vital Signs Range (Last 24H):         CBC: No results for input(s): "WBC", "RBC", "HGB", "HCT", "PLT", "MCV", "MCH", "MCHC" in the last 72 hours.    CMP: No results for input(s): "NA", "K", "CL", "CO2", "BUN", "CREATININE", "GLU", "MG", "PHOS", "CALCIUM", "ALBUMIN", "PROT", "ALKPHOS", "ALT", "AST", "BILITOT" in the last 72 hours.    INR  No results for input(s): "PT", "INR", "PROTIME", "APTT" in the last 72 hours.        Diagnostic Studies:      EKD Echo:          Pre-op Assessment    I have reviewed the Patient Summary Reports.     I have reviewed the Nursing Notes.    I have reviewed the Medications. "     Review of Systems  Anesthesia Hx:  No problems with previous Anesthesia                Social:  Non-Smoker       Hematology/Oncology:  Hematology Normal   Oncology Normal                                   EENT/Dental:  EENT/Dental Normal           Pulmonary:  Pulmonary Normal                       Hepatic/GI:  Hepatic/GI Normal                 Musculoskeletal:  Musculoskeletal Normal                Endocrine:  Endocrine Normal            Dermatological:  Skin Normal        Physical Exam  General: Well nourished    Airway:  Mallampati: II   Mouth Opening: Normal  Tongue: Normal  Neck ROM: Normal ROM    Dental:  Intact    Chest/Lungs:  Clear to auscultation        Anesthesia Plan  Type of Anesthesia, risks & benefits discussed:    Anesthesia Type: Gen ETT, Gen Natural Airway  Intra-op Monitoring Plan: Standard ASA Monitors  Post Op Pain Control Plan: multimodal analgesia  Induction:  IV  Airway Plan: Direct  Informed Consent: Informed consent signed with the Patient and all parties understand the risks and agree with anesthesia plan.  All questions answered.   ASA Score: 3    Ready For Surgery From Anesthesia Perspective.     .

## 2024-05-03 NOTE — ANESTHESIA POSTPROCEDURE EVALUATION
Anesthesia Post Evaluation    Patient: Lukasz Person    Procedure(s) Performed: Procedure(s) (LRB):  EGD (ESOPHAGOGASTRODUODENOSCOPY) with botox injection (N/A)    [facility] Anesthesia Post Op Evaluation      Vitals Value Taken Time   /67 05/03/24 0802   Temp 36.4 °C (97.5 °F) 05/03/24 0741   Pulse 43 05/03/24 0815   Resp 16 05/03/24 0815   SpO2 100 % 05/03/24 0815   Vitals shown include unfiled device data.      No case tracking events are documented in the log.      Pain/Jeovanny Score: Jeovanny Score: 9 (5/3/2024  8:00 AM)

## 2024-05-03 NOTE — ANESTHESIA PROCEDURE NOTES
Intubation    Date/Time: 5/3/2024 7:06 AM    Performed by: Ashtyn Stephen CRNA  Authorized by: Deonte Coe MD    Intubation:     Induction:  Intravenous    Intubated:  Postinduction    Mask Ventilation:  Easy with oral airway    Attempts:  1    Attempted By:  CRNA    Method of Intubation:  Video laryngoscopy    Blade:  Dowling 3    Laryngeal View Grade: Grade I - full view of cords      Difficult Airway Encountered?: No      Complications:  None    Airway Device:  Oral endotracheal tube    Airway Device Size:  7.5    Style/Cuff Inflation:  Cuffed (inflated to minimal occlusive pressure)    Tube secured:  23    Secured at:  The lips    Placement Verified By:  Capnometry    Complicating Factors:  None    Findings Post-Intubation:  BS equal bilateral and atraumatic/condition of teeth unchanged

## 2024-05-03 NOTE — OP NOTE
[facility]  Surgical Oncology  Operative Note           Date of Procedure: 5/3/2024   Time: 0700    Surgeons and Role:     * Santana Brown Jr., MD - Primary     * Rozina Santiago MD - Resident - Assisting    Pre-Operative Diagnosis: Esophageal adenocarcinoma [C15.9]  Esophageal adenocarcinoma, history of esophagectomy  Pyloric stricture vs delayed gastric conduit emptying    Post-Operative Diagnosis:   Delayed gastric conduit emptying     Anesthesia: General    Procedure:  Esophagogastroduodenoscopy  Endoscopic injection of Botox to the pylorus    Operative Findings:   No evidence of recurrence within the esophagus or gastric conduit  Anastomosis widely patent without structure, easily traversed  Gastric conduit appeared healthy and well perfused  Pylorus was patent and easily traversed with standard upper endoscope  100u Botox injected in 4 quadrants of the pylorus         Indications:  Lukasz Person is a 69 y.o. year old male who is well known to me. He underwent robotic assisted esophagectomy in March of 2023 and an anastomotic dilation in April of 2023. Over the past year he has done remarkable well, tolerating a regular diet. More recently he has noted occasional regurgitation and dysphagia. He presents today for an EGD to evaluate for recurrent stricture or delayed gastric conduit emptying which may require dilation or Botox injection.    Risks and benefits were reviewed including bleeding, infection, pain, scar, damage to surrounding structures, cardiovascular and pulmonary complications, hollow viscus perforation, recurrent dysphagia, need for additional procedures, death, and imponderables.  He expressed understanding and gave informed consent to proceed.    Procedure in Detail:  After informed consent was obtained and the patient was properly identified, the patient was taken to the operating room and placed in the supine position.  After the uneventful induction of general anesthesia a  time-out was performed according to the [facility] guidelines. An upper endoscope was introduced into the oropharynx and guided down into the esophagus and gastric conduit. The gastric conduit was insufflated with air. There was no difficulty advancing the scope into the conduit and no evidence of anastomotic stricture. The conduit appeared healthy and well perfused. The scope was advanced to the pylorus, which was intubated easily without resistence. I did not appreciate a stricture at the pylorus. The duodenum appeared within normal limits.  The endoscope was withdrawn back into the conduit.   I proceeded with Botox injection of the pylorus. 100u were injected throughout four quadrants of the pylorus under direct visualization using an endoscopic needle. The pylorus was inspected and hemostasis was confirmed.    Once complete, the scope was withdrawn carefully out of the mouth ensuring to evacuate all air and mucous. On withdrawal, the esophagogastric anastomosis was again visualized and inspected. There was no evidence of recurrent disease or stricture. The patient tolerated the procedure well, there were no complications.All needle, lap, and sponge counts were reported as correct. The patient was extubated and taken to the recovery room in stable condition. I communicated the intraoperative findings with the family following the procedure.     Implants: None    Specimens:   Specimen (24h ago, onward)      None                    Condition: Good    Disposition: PACU - hemodynamically stable.    Attestation: I was present and scrubbed for the entire procedure.             Santana Brown Jr, MD      Surgical Oncology      5/3/2024, 7:41 AM    Portions of the record were created with [employer] voice recognition software. This may lead to occasional typographical errors due to the inherent limitations of the software. Read the chart carefully and recognize, using context, where  substitutions have occurred. Please do not hesitate to contact me directly if clarification is needed.

## 2024-05-03 NOTE — H&P
Surgical Oncology Preoperative H & P      Referring Provider: Dr. Santana Brown Jr.   PCP: Kirk Wilson MD    Reason For Visit: EGD    History of Present Illness    Lukasz Person is a 69 y.o. 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     Underwent robotic esophagectomy on 3/14/23 with Dr. Brown.  Pathology showed negative margins, ypT3 N0 tumor with 0 of 12 lymph nodes involved.  No treatment effect was noted on the tumor tissue.    Final cycle chemo administered 4/1/24.     Underwent dilation with Dr. Brown on 5/31/23 with temporary improvement in dysphagia. Weight stable.  Still having some dysphagia so esophogram ordered which showed concern for pyloric stricture. Will refer back to Dr. Brown for repeat EGD/possible dilation.     Staging:  Cancer Staging   Esophageal adenocarcinoma  Staging form: Esophagus - Adenocarcinoma, AJCC 8th Edition  - Pathologic stage from 3/28/2023: Stage II (ypT3, pN0, cM0, G2) - Signed by Santana Brown Jr., MD on 3/28/2023       No current facility-administered medications for this encounter.    Review of patient's allergies indicates:  No Known Allergies    Past Medical History:   Diagnosis Date    Anticoagulant long-term use     Cancer     Diabetes mellitus     Onset late 50s/early 60s    Hyperlipidemia     Hypertension     Onset late 50s/early 60s    Kidney stones     Sleep apnea     since 2006       Past Surgical History:   Procedure Laterality Date    COLONOSCOPY N/A 10/26/2023    Procedure: COLONOSCOPY;  Surgeon: Noah Duong MD;  Location: [address]);  Service: Endoscopy;  Laterality: N/A;  instructions sent to patient portal. Tbou  referral: Dr. Wilson  Pt. on Xarelto--tb-ok to hold see te 8/15/23 dr vu  10/13-precall complete-MS  10/19 pt rescheduled, Xarelto hold, PEG, portal -ml  10/24-precall complete-KPvt    CORONARY ANGIOGRAPHY  N/A 9/30/2020    Procedure: ANGIOGRAM, CORONARY ARTERY;  Surgeon: John West MD;  Location: [facility] CATH LAB;  Service: Cardiology;  Laterality: N/A;    CYSTOSCOPY N/A 2/17/2022    Procedure: CYSTOSCOPY;  Surgeon: Lukasz Hughes MD;  Location: [facility] OR 1ST FLR;  Service: Urology;  Laterality: N/A;    CYSTOSCOPY W/ URETERAL STENT PLACEMENT N/A 2/25/2022    Procedure: CYSTOSCOPY, WITH URETERAL STENT INSERTION;  Surgeon: Lukasz Hughes MD;  Location: [facility] OR 1ST FLR;  Service: Urology;  Laterality: N/A;    ENDOSCOPIC ULTRASOUND OF UPPER GASTROINTESTINAL TRACT N/A 12/7/2022    Procedure: ULTRASOUND, UPPER GI TRACT, ENDOSCOPIC;  Surgeon: Darian Main MD;  Location: [facility] ENDO;  Service: Endoscopy;  Laterality: N/A;  Approval to hold Xarelto rec'd from Dr. Nick (see t/e 12/5/22)-DS    ESOPHAGOGASTRODUODENOSCOPY N/A 11/17/2022    Procedure: EGD (ESOPHAGOGASTRODUODENOSCOPY);  Surgeon: Brody Gonzales MD;  Location: [facility] ENDO (2ND FLR);  Service: Endoscopy;  Laterality: N/A;  inst via email-ok to hold Xarelto x 2 days-MS    ESOPHAGOGASTRODUODENOSCOPY N/A 5/31/2023    Procedure: EGD (ESOPHAGOGASTRODUODENOSCOPY) WITH DILATION;  Surgeon: Santana Brown Jr., MD;  Location: [facility] OR 2ND FLR;  Service: General;  Laterality: N/A;    LASER LITHOTRIPSY Left 2/25/2022    Procedure: LITHOTRIPSY, USING LASER;  Surgeon: Lukasz Hughes MD;  [facility];  Service: Urology;  Laterality: Left;    LEFT HEART CATHETERIZATION Left 9/30/2020    Procedure: Left heart cath;  Surgeon: John West MD;  Location: [facility] CATH LAB;  Service: Cardiology;  Laterality: Left;    LITHOTRIPSY      PARATHYROIDECTOMY  1/1/2-107    PYELOSCOPY Left 2/25/2022    Procedure: PYELOSCOPY;  Surgeon: Lukasz Hughes MD;  Location: [facility] OR 1ST FLR;  Service: Urology;  Laterality: Left;    ROBOT-ASSISTED SURGICAL REMOVAL OF ESOPHAGUS USING DA CARLOS XI N/A 3/14/2023    Procedure: XI ROBOTIC ESOPHAGECTOMY;  Surgeon: Santana BAHENA  Stephanie Jett MD;  Location: [facility] OR 2ND FLR;  Service: General;  Laterality: N/A;  Abdomen, Chest and Neck    ROBOTIC JEJUNOSTOMY N/A 3/14/2023    Procedure: ROBOTIC JEJUNOSTOMY TUBE INSERTION;  Surgeon: Santana Brown Jr., MD;  Location: [facility] OR 2ND FLR;  Service: General;  Laterality: N/A;    TRANSESOPHAGEAL ECHOCARDIOGRAPHY N/A 4/7/2022    Procedure: ECHOCARDIOGRAM, TRANSESOPHAGEAL;  Surgeon: [facility] Diagnostic Provider;  Location: [facility] EP LAB;  Service: Cardiology;  Laterality: N/A;    TREATMENT OF CARDIAC ARRHYTHMIA N/A 4/7/2022    Procedure: Cardioversion or Defibrillation;  Surgeon: Iris Fisher NP;  Location: [facility] EP LAB;  Service: Cardiology;  Laterality: N/A;  afib, dccv, artie, anes, EH, 3prep    URETEROSCOPIC REMOVAL OF URETERIC CALCULUS Left 2/25/2022    Procedure: REMOVAL, CALCULUS, URETER, URETEROSCOPIC;  Surgeon: Lukasz Hughes MD;  Location: [facility] OR 1ST FLR;  Service: Urology;  Laterality: Left;    URETEROSCOPY Left 2/25/2022    Procedure: URETEROSCOPY;  Surgeon: Lukasz Hughes MD;  Location: [facility] OR 1ST FLR;  Service: Urology;  Laterality: Left;    VOCAL CORD INJECTION Left 3/17/2023    Procedure: INJECTION, VOCAL CORD, LARYNGOSCOPIC;  Surgeon: Gm Altman MD;  Location: [facility] OR 2ND FLR;  Service: ENT;  Laterality: Left;       Family History   Problem Relation Name Age of Onset    Arthritis Mother Juany     Heart disease Father Diomedes 70        CABG    Arthritis Father Diomedes     Diabetes Father Doimedes     Kidney disease Father Diomedes         had one kidney removed in early thirties    Arthritis Sister Dee     Arthritis Sister Josette     Hypertension Sister Delores     Colon cancer Brother Sterling 32    Arthritis Brother Diomedes     Alcohol abuse Paternal Aunt Betina     Cancer Maternal Grandfather Yaron         throat cancer    Diabetes Paternal Grandmother Cassandra     Colon polyps Paternal Grandfather Diomedes     Colon cancer Paternal Grandfather Diomedes      Cancer Paternal Grandfather Diomedes         colon cancer at age 62       Social History     Socioeconomic History    Marital status:      Spouse name: Amanda   Tobacco Use    Smoking status: Former     Current packs/day: 0.00     Average packs/day: 1 pack/day for 22.7 years (22.7 ttl pk-yrs)     Types: Cigarettes, Cigars     Start date: 1972     Quit date:      Years since quittin.9     Passive exposure: Never    Smokeless tobacco: Never    Tobacco comments:     smoking was off and on.  cumulative 7 years.  never more than three years in one stretch or more lj   Substance and Sexual Activity    Alcohol use: Yes     Alcohol/week: 4.0 standard drinks of alcohol     Types: 4 Shots of liquor per week    Drug use: Not Currently     Types: Marijuana    Sexual activity: Not Currently     Partners: Female     Birth control/protection: None     Comment:      Social Determinants of Health     Financial Resource Strain: Low Risk  (2024)    Overall Financial Resource Strain (CARDIA)     Difficulty of Paying Living Expenses: Not very hard   Food Insecurity: No Food Insecurity (2024)    Hunger Vital Sign     Worried About Running Out of Food in the Last Year: Never true     Ran Out of Food in the Last Year: Never true   Recent Concern: Food Insecurity - Food Insecurity Present (2023)    Hunger Vital Sign     Worried About Running Out of Food in the Last Year: Sometimes true     Ran Out of Food in the Last Year: Never true   Transportation Needs: No Transportation Needs (2024)    PRAPARE - Transportation     Lack of Transportation (Medical): No     Lack of Transportation (Non-Medical): No   Physical Activity: Insufficiently Active (2024)    Exercise Vital Sign     Days of Exercise per Week: 4 days     Minutes of Exercise per Session: 30 min   Stress: No Stress Concern Present (2024)    [place] [employer] - Occupational Stress Questionnaire      Feeling of Stress : Only a little   Housing Stability: Low Risk  (1/13/2024)    Housing Stability Vital Sign     Unable to Pay for Housing in the Last Year: No     Number of Places Lived in the Last Year: 1     Unstable Housing in the Last Year: No         Vitals:    05/03/24 0545   BP: (!) 140/64   Pulse: (!) 47   Resp: 16   Temp: 98.4 °F (36.9 °C)       Physical Exam  Constitutional:       Appearance: Normal appearance.   Cardiovascular:      Rate and Rhythm: Normal rate.   Pulmonary:      Effort: Pulmonary effort is normal. No respiratory distress.   Abdominal:      General: There is no distension.      Palpations: Abdomen is soft.      Tenderness: There is no abdominal tenderness.   Musculoskeletal:      Right lower leg: No edema.      Left lower leg: No edema.   Skin:     General: Skin is warm and dry.   Neurological:      General: No focal deficit present.      Mental Status: He is alert and oriented to person, place, and time.            Lab Results   Component Value Date    WBC 3.39 (L) 04/29/2024    HGB 13.0 (L) 04/29/2024    HCT 40.3 04/29/2024     (L) 04/29/2024    CHOL 112 (L) 04/17/2024    TRIG 71 04/17/2024    HDL 54 04/17/2024    LDLCALC 43.8 (L) 04/17/2024    ALT 31 04/29/2024    AST 23 04/29/2024     04/29/2024    K 4.4 04/29/2024     04/29/2024    CREATININE 1.1 04/29/2024    BUN 20 04/29/2024    CO2 28 04/29/2024    TSH 1.826 04/29/2024    PSA 0.94 10/16/2023    INR 1.8 (H) 04/29/2024    HGBA1C 6.8 (H) 04/01/2024       ASSESSMENT & PLAN:  1. Esophageal adenocarcinoma       Lukasz Person is a 69 y.o. 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    All risks, benefits, and alternatives were discussed in detail with the patient this morning and informed consent obtained.    Proceed with EGD placement today  Warfarin held per cardiology  recommendations.    Rozina Santiago MD   General Surgery PGY-1  5/3/2024

## 2024-05-03 NOTE — DISCHARGE INSTRUCTIONS
Consume a clear liquid diet until dinner time day of procedure.  OK to advance diet as tolerated until the evening and the days following procedure.

## 2024-05-06 NOTE — ANESTHESIA POSTPROCEDURE EVALUATION
Anesthesia Post Evaluation    Patient: Lukasz Person    Procedure(s) Performed: Procedure(s) (LRB):  EGD (ESOPHAGOGASTRODUODENOSCOPY) with botox injection (N/A)    Final Anesthesia Type: general      Patient location during evaluation: PACU  Patient participation: Yes- Able to Participate  Level of consciousness: awake and alert  Post-procedure vital signs: reviewed and stable  Pain management: adequate  Airway patency: patent    PONV status at discharge: No PONV  Anesthetic complications: no      Cardiovascular status: blood pressure returned to baseline  Respiratory status: unassisted  Hydration status: euvolemic  Follow-up not needed.              Vitals Value Taken Time   /71 05/03/24 0817   Temp 36.4 °C (97.5 °F) 05/03/24 0741   Pulse 41 05/03/24 0822   Resp 24 05/03/24 0822   SpO2 100 % 05/03/24 0821   Vitals shown include unfiled device data.      No case tracking events are documented in the log.      Pain/Jeovanny Score: No data recorded

## 2024-05-09 ENCOUNTER — LAB VISIT (OUTPATIENT)
Dept: LAB | Facility: HOSPITAL | Age: 69
End: 2024-05-09
Attending: INTERNAL MEDICINE
Payer: MEDICARE

## 2024-05-09 ENCOUNTER — PATIENT MESSAGE (OUTPATIENT)
Dept: CARDIOLOGY | Facility: CLINIC | Age: 69
End: 2024-05-09

## 2024-05-09 ENCOUNTER — ANTI-COAG VISIT (OUTPATIENT)
Dept: CARDIOLOGY | Facility: CLINIC | Age: 69
End: 2024-05-09
Payer: MEDICARE

## 2024-05-09 DIAGNOSIS — Z79.01 LONG TERM (CURRENT) USE OF ANTICOAGULANTS: ICD-10-CM

## 2024-05-09 DIAGNOSIS — I48.0 PAROXYSMAL ATRIAL FIBRILLATION: Primary | Chronic | ICD-10-CM

## 2024-05-09 LAB
INR PPP: 1.5 (ref 0.8–1.2)
PROTHROMBIN TIME: 16 SEC (ref 9–12.5)

## 2024-05-09 PROCEDURE — 93793 ANTICOAG MGMT PT WARFARIN: CPT | Mod: ,,,

## 2024-05-09 PROCEDURE — 85610 PROTHROMBIN TIME: CPT | Performed by: INTERNAL MEDICINE

## 2024-05-09 PROCEDURE — 36415 COLL VENOUS BLD VENIPUNCTURE: CPT | Performed by: INTERNAL MEDICINE

## 2024-05-09 NOTE — PROGRESS NOTES
[facility]    05/09/2024 2:05 PM    Lukasz Person (69 y.o.) is followed by the [facility].      Assessment/Plan:    Lukasz Person presents today with subtherapeutic  INR. Goal INR 2.0-3.0    Assessment of patient findings per MA/LPN and chart review:   The following significant findings were found:  Patient resumed warfarin on 5/3.   Patient denies any bleeding or changes to diet, health or medication following EGD.     Recommendation for patient's warfarin regimen:   Due to subtherapeutic INR, patient was instructed to take a booster dose today and tomorrow, but will resume their normal weekly dose.    Recommended repeat INR in 1 week      Seema Keller, PharmD   Clinical Pharmacist - [facility]

## 2024-05-13 ENCOUNTER — CLINICAL SUPPORT (OUTPATIENT)
Dept: REHABILITATION | Facility: HOSPITAL | Age: 69
End: 2024-05-13
Payer: MEDICARE

## 2024-05-13 DIAGNOSIS — S46.012A TRAUMATIC TEAR OF LEFT ROTATOR CUFF, UNSPECIFIED TEAR EXTENT, INITIAL ENCOUNTER: Primary | ICD-10-CM

## 2024-05-13 PROCEDURE — 97110 THERAPEUTIC EXERCISES: CPT

## 2024-05-13 PROCEDURE — 97140 MANUAL THERAPY 1/> REGIONS: CPT

## 2024-05-13 NOTE — PROGRESS NOTES
[facility]  Occupational Therapy RE Eval note       Date: 3/28/2024  Name: Lukasz Person  Clinic Number: [id number]        Therapy Diagnosis: left shoulder pain         Physician: Kirk Wilson MD     Physician Orders:  eval and treat   Medical Diagnosis: S46.012A (ICD-10-CM) - Traumatic tear of left rotator cuff, unspecified tear extent, initial encounter   Surgical Procedure/ Date :reports no shoulder sx   Evaluation Date: 1/25/2024  Insurance:Medicare   Insurance Authorization period Expiration: 1/21/25   Plan of Care Certification Period: 4/30/24      Visit # / Visits Authortized: 6 / 20   Time In:1;00 PM   Time Out:2;00 pm    Total Billable Time: 60 minutes        Precautions: Standard, needs to have head elevated in supine due to esophageal cancer and reconstruction        Subjective      Pt reports that he sleeps on a wedge     Patient reports: reports the pain was good til he went doing a lot of work in his garage .   He was compliant with home exercise program given last session.   Response to previous treatment: first tx  Functional change: none to be noted to this date      Pain: 0/10  Location: left shoulder       Objective      Objective Measures updated at progress report unless specified.     Active Range of Motion: Measured in degrees       Shoulder Right Left  Left 2/29/24 3/28/24 left     Flexion 155 130 160  160   Abduction 130 130 150 155   ER at 0 80 60 60 60   ER at 90 60  60  60 60   IR 60 60 T 12  T 12          Shoulder Right   Shoulder flexion: 4/5   Shoulder Abduction: 4/5   Shoulder ER 4/5   Shoulder IR 4/5   Lower Trap 5/5   Middle Trap 5/5   Rhomboids 5/5         Scapular Control/Dyskinesis:     Normal / Subtle / Obvious  Comments    Left  Protracted      Right  Protracted       He reports that he does sleep         Palpation Shoulder:     Lateral shoulder pain              CMS Impairment/Limitation/Restriction for FOTO initial  Survey     Therapist reviewed FOTO  scores for Lukasz Mahr on 1/25/2024.   FOTO documents entered into [employer] - see Media section.      17 intake score             Treatment      Lukasz received the treatments listed below:     Hot pack to left shoulder X 10 minutes       Manual  therapy X 10 minutes Shoulder approximation with isometrics         therapeutic exercises to develop strength for 30   minutes including:  -  Red t band alcon  ( rows) , Er with red band , IR  with red band   and shoulder adduction  20 reps ( added to HEP )     Brueggers red band   ABC' s scapula stab ex   Body blade  2 minutes    Next visit  shoulder stabilization with alphabet  on wall and body blade             Patient Education and Home Exercises      Education provided:   - added table slides with HEP   - Progress towards goals      Written Home Exercises Provided: Patient instructed to cont prior HEP.  Exercises were reviewed and Lukasz was able to demonstrate them prior to the end of the session.  Lukasz demonstrated good  understanding of the home exercise program provided. See electronic medical record under Patient Instructions for exercises provided during therapy sessions.       Assessment      Good debra today. Good ROM slight pain  ,  Pt reporting increased improvement only slight anterior shoulder pain    Lukasz is progressing well towards his goals and there are no updates to goals at this time. Pt prognosis is Good.      Patient will continue to benefit from skilled outpatient occupational therapy to address the deficits listed in the problem list on initial evaluation provide patient/family education and to maximize patient's level of independence in the home and community environment.      Patient's spiritual, cultural and educational needs considered and patient agreeable to plan of care and goals.        Anticipated barriers to occupational therapy:      Goals:  Short Term (4  weeks on 6/30/24 ):  1)   Patient to be IND with HEP and modalities for  pain management  2)   Increase ROM 15 degrees in left shoulder motion to increase functional UE use for  shoulder flexion, scaption and ER   3)  Improve muscle strength ing 4/+5 by shoulder motions  in order to carry items with left shoulder .      Long Term (by discharge):  1)   Pt will report 0 out of 10 pain with left shoulder . Reaching forward or overhead into closets .  2)   Patient to increase score by  10 on FOTO to demonstrate improved perception of functional left use.  3)   Pt will return to prior level of function for ADLs and household management.      Plan      Updates/Grading for next session:      Sudha Murillo, OT

## 2024-05-16 ENCOUNTER — LAB VISIT (OUTPATIENT)
Dept: LAB | Facility: HOSPITAL | Age: 69
End: 2024-05-16
Attending: INTERNAL MEDICINE
Payer: MEDICARE

## 2024-05-16 DIAGNOSIS — R21 RASH: ICD-10-CM

## 2024-05-16 DIAGNOSIS — Z79.01 LONG TERM (CURRENT) USE OF ANTICOAGULANTS: ICD-10-CM

## 2024-05-16 LAB
INR PPP: 2.7 (ref 0.8–1.2)
PROTHROMBIN TIME: 27.7 SEC (ref 9–12.5)

## 2024-05-16 PROCEDURE — 85610 PROTHROMBIN TIME: CPT | Performed by: INTERNAL MEDICINE

## 2024-05-16 PROCEDURE — 36415 COLL VENOUS BLD VENIPUNCTURE: CPT | Performed by: INTERNAL MEDICINE

## 2024-05-16 RX ORDER — TRIAMCINOLONE ACETONIDE 1 MG/G
CREAM TOPICAL 2 TIMES DAILY
Qty: 45 G | Refills: 2 | Status: SHIPPED | OUTPATIENT
Start: 2024-05-16

## 2024-05-17 ENCOUNTER — PATIENT MESSAGE (OUTPATIENT)
Dept: CARDIOLOGY | Facility: CLINIC | Age: 69
End: 2024-05-17

## 2024-05-17 ENCOUNTER — ANTI-COAG VISIT (OUTPATIENT)
Dept: CARDIOLOGY | Facility: CLINIC | Age: 69
End: 2024-05-17
Payer: MEDICARE

## 2024-05-17 DIAGNOSIS — I48.0 PAROXYSMAL ATRIAL FIBRILLATION: Primary | Chronic | ICD-10-CM

## 2024-05-17 PROCEDURE — 93793 ANTICOAG MGMT PT WARFARIN: CPT | Mod: ,,,

## 2024-05-17 NOTE — PROGRESS NOTES
[facility]    05/17/2024 11:06 AM    Lukasz Person (69 y.o.) is followed by the [facility].      Assessment/Plan:    Lukasz Person presents today with therapeutic INR. Goal INR 2.0-3.0    Assessment of patient findings per MA/LPN and chart review:   The following significant findings were found:  None    Recommendation for patient's warfarin regimen:   Weekly warfarin dose increased due to repeated subtherapeutic INRs.    Recommended repeat INR in 2 weeks      Melissa Kinsey, PharmD, BCPS  Clinical Pharmacist - [facility]  Preferred Contact: Secure Messaging or In Basket Message

## 2024-05-20 ENCOUNTER — CLINICAL SUPPORT (OUTPATIENT)
Dept: REHABILITATION | Facility: HOSPITAL | Age: 69
End: 2024-05-20
Payer: MEDICARE

## 2024-05-20 DIAGNOSIS — S46.012A TRAUMATIC TEAR OF LEFT ROTATOR CUFF, UNSPECIFIED TEAR EXTENT, INITIAL ENCOUNTER: Primary | ICD-10-CM

## 2024-05-20 PROCEDURE — 97110 THERAPEUTIC EXERCISES: CPT

## 2024-05-20 NOTE — PROGRESS NOTES
[facility]  Occupational Therapy RE Eval note       Date: 3/28/2024  Name: Lukasz Person  Clinic Number: [id number]        Therapy Diagnosis: left shoulder pain         Physician: Kirk Wilson MD     Physician Orders:  eval and treat   Medical Diagnosis: S46.012A (ICD-10-CM) - Traumatic tear of left rotator cuff, unspecified tear extent, initial encounter   Surgical Procedure/ Date :reports no shoulder sx   Evaluation Date: 1/25/2024  Insurance:Medicare   Insurance Authorization period Expiration: 1/21/25   Plan of Care Certification Period: 4/30/24      Visit # / Visits Authortized: 6 / 20   Time In:1;00 PM   Time Out:2;00 pm    Total Billable Time: 60 minutes        Precautions: Standard, needs to have head elevated in supine due to esophageal cancer and reconstruction        Subjective      Pt reports that he sleeps on a wedge     Patient reports: reports the pain was good til he went doing a lot of work in his garage .   He was compliant with home exercise program given last session.   Response to previous treatment: first tx  Functional change: none to be noted to this date      Pain: 0/10  Location: left shoulder       Objective      Objective Measures updated at progress report unless specified.     Active Range of Motion: Measured in degrees       Shoulder Right Left  Left 2/29/24 3/28/24 left   5/20/24    Left    Flexion 155 130 160  160 160   Abduction 130 130 150 155 155   ER at 0 80 60 60 60 60   ER at 90 60  60  60 60 60   IR 60 60 T 12  T 12  T 12          Shoulder Right   Shoulder flexion: 4/5   Shoulder Abduction: 4/5   Shoulder ER 4/5   Shoulder IR 4/5   Lower Trap 5/5   Middle Trap 5/5   Rhomboids 5/5         Scapular Control/Dyskinesis:     Normal / Subtle / Obvious  Comments    Left  Protracted      Right  Protracted       He reports that he does sleep         Palpation Shoulder:     Lateral shoulder pain              CMS Impairment/Limitation/Restriction for FOTO  initial  Survey     Therapist reviewed FOTO scores for Lukasz Person on 1/25/2024.   FOTO documents entered into [employer] - see Media section.      17 intake score             Treatment      Lukasz received the treatments listed below:     Hot pack to left shoulder X 10 minutes         Manual  therapy X 10 minutes Shoulder approximation with isometrics        therapeutic exercises to develop strength for 38   minutes including:  -  Red t band alcon  ( rows) , Er with red band , IR  with red band   and shoulder adduction  20 reps ( added to HEP )     Brueggers red band   ABC' s scapula stab ex   Body blade  2 minutes    shoulder stabilization with alphabet  on wall  2 sets   and body blade    3 minutes             Patient Education and Home Exercises      Education provided:   - added table slides with HEP   - Progress towards goals      Written Home Exercises Provided: Patient instructed to cont prior HEP.  Exercises were reviewed and Lukasz was able to demonstrate them prior to the end of the session.  Lukasz demonstrated good  understanding of the home exercise program provided. See electronic medical record under Patient Instructions for exercises provided during therapy sessions.       Assessment      Good debra today. Good ROM slight pain  ,  Pt reporting increased improvement only slight anterior shoulder pain    Lukasz is progressing well towards his goals and there are no updates to goals at this time. Pt prognosis is Good.      Patient will continue to benefit from skilled outpatient occupational therapy to address the deficits listed in the problem list on initial evaluation provide patient/family education and to maximize patient's level of independence in the home and community environment.      Patient's spiritual, cultural and educational needs considered and patient agreeable to plan of care and goals.        Anticipated barriers to occupational therapy:      Goals:  Short Term (4  weeks on 6/30/24  ):  1)   Patient to be IND with HEP and modalities for pain management  2)   Increase ROM 15 degrees in left shoulder motion to increase functional UE use for  shoulder flexion, scaption and ER   3)  Improve muscle strength ing 4/+5 by shoulder motions  in order to carry items with left shoulder .      Long Term (by discharge):  1)   Pt will report 0 out of 10 pain with left shoulder . Reaching forward or overhead into closets .  2)   Patient to increase score by  10 on FOTO to demonstrate improved perception of functional left use.  3)   Pt will return to prior level of function for ADLs and household management.      Plan      Updates/Grading for next session:      Sudha Murillo, OT

## 2024-05-21 ENCOUNTER — PATIENT MESSAGE (OUTPATIENT)
Dept: HEMATOLOGY/ONCOLOGY | Facility: CLINIC | Age: 69
End: 2024-05-21
Payer: MEDICARE

## 2024-05-22 ENCOUNTER — PATIENT MESSAGE (OUTPATIENT)
Dept: CARDIOLOGY | Facility: CLINIC | Age: 69
End: 2024-05-22
Payer: MEDICARE

## 2024-05-23 ENCOUNTER — TELEPHONE (OUTPATIENT)
Dept: HEMATOLOGY/ONCOLOGY | Facility: CLINIC | Age: 69
End: 2024-05-23
Payer: MEDICARE

## 2024-05-28 ENCOUNTER — INFUSION (OUTPATIENT)
Dept: INFUSION THERAPY | Facility: HOSPITAL | Age: 69
End: 2024-05-28
Attending: INTERNAL MEDICINE
Payer: MEDICARE

## 2024-05-28 ENCOUNTER — ANTI-COAG VISIT (OUTPATIENT)
Dept: CARDIOLOGY | Facility: CLINIC | Age: 69
End: 2024-05-28
Payer: MEDICARE

## 2024-05-28 ENCOUNTER — PATIENT MESSAGE (OUTPATIENT)
Dept: CARDIOLOGY | Facility: CLINIC | Age: 69
End: 2024-05-28

## 2024-05-28 DIAGNOSIS — I48.0 PAROXYSMAL ATRIAL FIBRILLATION: Primary | Chronic | ICD-10-CM

## 2024-05-28 DIAGNOSIS — C15.9 ESOPHAGEAL ADENOCARCINOMA: Primary | ICD-10-CM

## 2024-05-28 PROCEDURE — 96523 IRRIG DRUG DELIVERY DEVICE: CPT | Mod: Q1

## 2024-05-28 PROCEDURE — 93793 ANTICOAG MGMT PT WARFARIN: CPT | Mod: ,,,

## 2024-05-28 PROCEDURE — 25000003 PHARM REV CODE 250: Performed by: INTERNAL MEDICINE

## 2024-05-28 PROCEDURE — 63600175 PHARM REV CODE 636 W HCPCS: Mod: Q1 | Performed by: INTERNAL MEDICINE

## 2024-05-28 PROCEDURE — A4216 STERILE WATER/SALINE, 10 ML: HCPCS | Performed by: INTERNAL MEDICINE

## 2024-05-28 RX ORDER — SODIUM CHLORIDE 0.9 % (FLUSH) 0.9 %
10 SYRINGE (ML) INJECTION
Status: DISCONTINUED | OUTPATIENT
Start: 2024-05-28 | End: 2024-05-28 | Stop reason: HOSPADM

## 2024-05-28 RX ORDER — HEPARIN 100 UNIT/ML
500 SYRINGE INTRAVENOUS
Status: DISCONTINUED | OUTPATIENT
Start: 2024-05-28 | End: 2024-05-28 | Stop reason: HOSPADM

## 2024-05-28 RX ADMIN — SODIUM CHLORIDE, PRESERVATIVE FREE 10 ML: 5 INJECTION INTRAVENOUS at 07:05

## 2024-05-28 RX ADMIN — HEPARIN 500 UNITS: 100 SYRINGE at 07:05

## 2024-05-28 NOTE — PROGRESS NOTES
[facility]    05/28/2024    Lukasz Person (69 y.o.) is followed by the [facility].      Assessment/Plan:    Lukasz Person presents today with supratherapeutic INR. Goal INR: 2.0-3.0    Lab Results   Component Value Date    INR 4.2 (H) 05/28/2024    INR 2.7 (H) 05/16/2024    INR 1.5 (H) 05/09/2024       Assessment of patient findings per MA/LPN and chart review:   The following significant findings were found:   None    Recommendation for patient's warfarin regimen:   Due to supratherapeutic INR, patient was instructed to SKIP their next 1 warfarin doses.      Recommended repeat INR in 1 week      Rona EscotoD, BCPS  Clinical Pharmacist - [facility]  Preferred Contact: Secure Messaging or In Basket Message

## 2024-05-29 NOTE — PROGRESS NOTES
Pt states he took 6mg on 05/28 via portal. Pt read the message to cristhian. Sent to pharmD to redose.

## 2024-06-04 ENCOUNTER — CLINICAL SUPPORT (OUTPATIENT)
Dept: REHABILITATION | Facility: HOSPITAL | Age: 69
End: 2024-06-04
Payer: MEDICARE

## 2024-06-04 ENCOUNTER — ANTI-COAG VISIT (OUTPATIENT)
Dept: CARDIOLOGY | Facility: CLINIC | Age: 69
End: 2024-06-04
Payer: MEDICARE

## 2024-06-04 ENCOUNTER — PATIENT MESSAGE (OUTPATIENT)
Dept: CARDIOLOGY | Facility: CLINIC | Age: 69
End: 2024-06-04

## 2024-06-04 ENCOUNTER — LAB VISIT (OUTPATIENT)
Dept: LAB | Facility: HOSPITAL | Age: 69
End: 2024-06-04
Attending: INTERNAL MEDICINE
Payer: MEDICARE

## 2024-06-04 DIAGNOSIS — S46.012A TRAUMATIC TEAR OF LEFT ROTATOR CUFF, UNSPECIFIED TEAR EXTENT, INITIAL ENCOUNTER: Primary | ICD-10-CM

## 2024-06-04 DIAGNOSIS — I48.0 PAROXYSMAL ATRIAL FIBRILLATION: Primary | Chronic | ICD-10-CM

## 2024-06-04 DIAGNOSIS — Z79.01 LONG TERM (CURRENT) USE OF ANTICOAGULANTS: ICD-10-CM

## 2024-06-04 LAB
INR PPP: 2.6 (ref 0.8–1.2)
PROTHROMBIN TIME: 26.8 SEC (ref 9–12.5)

## 2024-06-04 PROCEDURE — 93793 ANTICOAG MGMT PT WARFARIN: CPT | Mod: ,,,

## 2024-06-04 PROCEDURE — 85610 PROTHROMBIN TIME: CPT | Performed by: INTERNAL MEDICINE

## 2024-06-04 PROCEDURE — 36415 COLL VENOUS BLD VENIPUNCTURE: CPT | Performed by: INTERNAL MEDICINE

## 2024-06-04 PROCEDURE — 97110 THERAPEUTIC EXERCISES: CPT

## 2024-06-04 NOTE — PROGRESS NOTES
[facility]  Occupational Therapy RE Eval note       Date: 3/28/2024  Name: Lukasz Person  Clinic Number: [id number]        Therapy Diagnosis: left shoulder pain         Physician: Kirk Wilson MD     Physician Orders:  eval and treat   Medical Diagnosis: S46.012A (ICD-10-CM) - Traumatic tear of left rotator cuff, unspecified tear extent, initial encounter   Surgical Procedure/ Date :reports no shoulder sx   Evaluation Date: 1/25/2024  Insurance:Medicare   Insurance Authorization period Expiration: 1/21/25   Plan of Care Certification Period: 4/30/24      Visit # / Visits Authortized:7/ 20   Time In:8:30 am   Time Out:9;30 am     Total Billable Time: 60 minutes        Precautions: Standard, needs to have head elevated in supine due to esophageal cancer and reconstruction        Subjective      Pt reports that he sleeps on a wedge     Patient reports: reports the pain was good til he went doing a lot of work in his garage .   He was compliant with home exercise program given last session.   Response to previous treatment: first tx  Functional change: none to be noted to this date      Pain: 4/10  Location: left shoulder       Objective      Objective Measures updated at progress report unless specified.     Active Range of Motion: Measured in degrees       Shoulder Right Left  Left 2/29/24 3/28/24 left   5/20/24    Left    Flexion 155 130 160  160 160   Abduction 130 130 150 155 155   ER at 0 80 60 60 60 60   ER at 90 60  60  60 60 60   IR 60 60 T 12  T 12  T 12          Shoulder Right   Shoulder flexion: 4/5   Shoulder Abduction: 4/5   Shoulder ER 4/5   Shoulder IR 4/5   Lower Trap 5/5   Middle Trap 5/5   Rhomboids 5/5         Scapular Control/Dyskinesis:     Normal / Subtle / Obvious  Comments    Left  Protracted      Right  Protracted       He reports that he does sleep         Palpation Shoulder:     Lateral shoulder pain              CMS Impairment/Limitation/Restriction for FOTO initial   Survey     Therapist reviewed FOTO scores for Lukasz Person on 1/25/2024.   FOTO documents entered into [facility] [employer] - see Media section.      17 intake score             Treatment      Lukasz received the treatments listed below:     Hot pack to left shoulder X 10 minutes         Manual  therapy X 10 minutes Shoulder approximation with isometrics        therapeutic exercises to develop strength for 38   minutes including:  -  Red t band alcon  ( rows) , Er with red band , IR  with red band   and shoulder adduction  20 reps ( added to HEP )     Brueggers red band   ABC' s scapula stab ex   Body blade  2 minutes    Manual supine shoulder stabilization with mod resistance  from therapist    shoulder stabilization with alphabet  on wall  2 sets   and body blade    3 minutes             Patient Education and Home Exercises      Education provided:   - added table slides with HEP   - Progress towards goals      Written Home Exercises Provided: Patient instructed to cont prior HEP.  Exercises were reviewed and Lukasz was able to demonstrate them prior to the end of the session.  Lukasz demonstrated good  understanding of the home exercise program provided. See electronic medical record under Patient Instructions for exercises provided during therapy sessions.       Assessment      Good debra today. Good ROM slight pain  ,  Pt reporting increased improvement only slight anterior shoulder pain  with very end range shoulder flexion   Lukasz is progressing well towards his goals and there are no updates to goals at this time. Pt prognosis is Good.      Patient will continue to benefit from skilled outpatient occupational therapy to address the deficits listed in the problem list on initial evaluation provide patient/family education and to maximize patient's level of independence in the home and community environment.      Patient's spiritual, cultural and educational needs considered and patient agreeable to plan of care and  goals.        Anticipated barriers to occupational therapy:      Goals:  Short Term (4  weeks on 6/30/24 ):  1)   Patient to be IND with HEP and modalities for pain management  2)   Increase ROM 15 degrees in left shoulder motion to increase functional UE use for  shoulder flexion, scaption and ER   3)  Improve muscle strength ing 4/+5 by shoulder motions  in order to carry items with left shoulder .      Long Term (by discharge):  1)   Pt will report 0 out of 10 pain with left shoulder . Reaching forward or overhead into closets .  2)   Patient to increase score by  10 on FOTO to demonstrate improved perception of functional left use.  3)   Pt will return to prior level of function for ADLs and household management.      Plan      Updates/Grading for next session:      Sudha Murillo OT

## 2024-06-04 NOTE — PROGRESS NOTES
[facility]    06/04/2024    Lukasz ePrson (69 y.o.) is followed by the [facility].      Assessment/Plan:    Lukasz Person presents with a therapeutic INR. Goal INR: 2.0-3.0    Lab Results   Component Value Date    INR 2.6 (H) 06/04/2024    INR 4.2 (H) 05/28/2024    INR 2.7 (H) 05/16/2024       Assessment of patient findings per MA/LPN and chart review:   The following significant findings were found:   None    Recommendation for patient's warfarin regimen:   No change was made to warfarin therapy during this visit and patient has been instructed to continue their current warfarin regimen.    Recommended repeat INR in 2 weeks      Melissa Kinsey, PharmD, BCPS  Clinical Pharmacist - [facility]  Preferred Contact: Secure Messaging or In Basket Message

## 2024-06-11 ENCOUNTER — OFFICE VISIT (OUTPATIENT)
Dept: PODIATRY | Facility: CLINIC | Age: 69
End: 2024-06-11
Payer: MEDICARE

## 2024-06-11 VITALS
DIASTOLIC BLOOD PRESSURE: 66 MMHG | HEIGHT: 72 IN | SYSTOLIC BLOOD PRESSURE: 120 MMHG | BODY MASS INDEX: 26.82 KG/M2 | HEART RATE: 52 BPM | RESPIRATION RATE: 18 BRPM | WEIGHT: 198 LBS

## 2024-06-11 DIAGNOSIS — E11.42 DIABETIC POLYNEUROPATHY ASSOCIATED WITH TYPE 2 DIABETES MELLITUS: Primary | ICD-10-CM

## 2024-06-11 DIAGNOSIS — D49.9 IMMUNODEFICIENCY SECONDARY TO NEOPLASM: ICD-10-CM

## 2024-06-11 DIAGNOSIS — D84.81 IMMUNODEFICIENCY SECONDARY TO NEOPLASM: ICD-10-CM

## 2024-06-11 DIAGNOSIS — R60.0 BILATERAL LOWER EXTREMITY EDEMA: ICD-10-CM

## 2024-06-11 DIAGNOSIS — B35.1 ONYCHOMYCOSIS DUE TO DERMATOPHYTE: ICD-10-CM

## 2024-06-11 PROCEDURE — 11721 DEBRIDE NAIL 6 OR MORE: CPT | Mod: Q9,S$PBB,, | Performed by: PODIATRIST

## 2024-06-11 PROCEDURE — 99499 UNLISTED E&M SERVICE: CPT | Mod: S$PBB,,, | Performed by: PODIATRIST

## 2024-06-11 PROCEDURE — 99213 OFFICE O/P EST LOW 20 MIN: CPT | Mod: PBBFAC,PN,25 | Performed by: PODIATRIST

## 2024-06-11 PROCEDURE — 99999 PR PBB SHADOW E&M-EST. PATIENT-LVL III: CPT | Mod: PBBFAC,,, | Performed by: PODIATRIST

## 2024-06-11 PROCEDURE — 11721 DEBRIDE NAIL 6 OR MORE: CPT | Mod: PBBFAC,PN | Performed by: PODIATRIST

## 2024-06-11 NOTE — PROGRESS NOTES
Subjective:      Patient ID: Lukasz Person is a 69 y.o. male.    Chief Complaint:   Diabetic Foot Exam (Kirk Wilson MD at 4/29/2024  ), Nail Problem, Nail Care, Foot Problem (NUMBNESS - TINGLING - BURNING ), and Toe Pain (TOP OF THE TOES )    Lukasz is a 69 y.o. male who returns to the clinic or evaluation and treatment of diabetic feet. Lukasz has a past medical history of Anticoagulant long-term use, Cancer, Diabetes mellitus, Hyperlipidemia, Hypertension, Kidney stones, and Sleep apnea.     + n/t  Last visit rx antifungal  Doing ok    PCP: Kirk Wilson MD    Date Last Seen by PCP: 4/29/24    Current shoe gear: Tennis shoes    Hemoglobin A1C   Date Value Ref Range Status   04/01/2024 6.8 (H) 4.0 - 5.6 % Final     Comment:     ADA Screening Guidelines:  5.7-6.4%  Consistent with prediabetes  >or=6.5%  Consistent with diabetes    High levels of fetal hemoglobin interfere with the HbA1C  assay. Heterozygous hemoglobin variants (HbS, HgC, etc)do  not significantly interfere with this assay.   However, presence of multiple variants may affect accuracy.     01/08/2024 6.2 (H) 4.0 - 5.6 % Final     Comment:     ADA Screening Guidelines:  5.7-6.4%  Consistent with prediabetes  >or=6.5%  Consistent with diabetes    High levels of fetal hemoglobin interfere with the HbA1C  assay. Heterozygous hemoglobin variants (HbS, HgC, etc)do  not significantly interfere with this assay.   However, presence of multiple variants may affect accuracy.     05/29/2023 6.2 (H) 4.0 - 5.6 % Final     Comment:     ADA Screening Guidelines:  5.7-6.4%  Consistent with prediabetes  >or=6.5%  Consistent with diabetes    High levels of fetal hemoglobin interfere with the HbA1C  assay. Heterozygous hemoglobin variants (HbS, HgC, etc)do  not significantly interfere with this assay.   However, presence of multiple variants may affect accuracy.              Past Medical History:   Diagnosis Date    Anticoagulant long-term use     Cancer     Diabetes  mellitus     Onset late 50s/early 60s    Hyperlipidemia     Hypertension     Onset late 50s/early 60s    Kidney stones     Sleep apnea     since 2006     Past Surgical History:   Procedure Laterality Date    COLONOSCOPY N/A 10/26/2023    Procedure: COLONOSCOPY;  Surgeon: Noah Doung MD;  Location: [facility] (4TH FLR);  Service: Endoscopy;  Laterality: N/A;  instructions sent to patient portal. Tb  referral: Dr. Wilson  Pt. on Xarelto--tb-ok to hold see te 8/15/23 dr vu  10/13-precall complete-MS  10/19 pt rescheduled, Xarelto hold, PEG, portal -ml  10/24-precall complete-KPvt    CORONARY ANGIOGRAPHY N/A 9/30/2020    Procedure: ANGIOGRAM, CORONARY ARTERY;  Surgeon: John West MD;  Location: [facility] CATH LAB;  Service: Cardiology;  Laterality: N/A;    CYSTOSCOPY N/A 2/17/2022    Procedure: CYSTOSCOPY;  Surgeon: Lukasz Hughes MD;  Location: [facility] OR 1ST FLR;  Service: Urology;  Laterality: N/A;    CYSTOSCOPY W/ URETERAL STENT PLACEMENT N/A 2/25/2022    Procedure: CYSTOSCOPY, WITH URETERAL STENT INSERTION;  Surgeon: Lukasz Hughes MD;  [facility];  Service: Urology;  Laterality: N/A;    ENDOSCOPIC ULTRASOUND OF UPPER GASTROINTESTINAL TRACT N/A 12/7/2022    Procedure: ULTRASOUND, UPPER GI TRACT, ENDOSCOPIC;  Surgeon: Darian Main MD;  Location: [facility];  Service: Endoscopy;  Laterality: N/A;  Approval to hold Xarelto rec'd from Dr. Vu (see t/e 12/5/22)-DS    ESOPHAGOGASTRODUODENOSCOPY N/A 11/17/2022    Procedure: EGD (ESOPHAGOGASTRODUODENOSCOPY);  Surgeon: Brody Gonzales MD;  Location: [facility] (2ND FLR);  Service: Endoscopy;  Laterality: N/A;  inst via email-ok to hold Xarelto x 2 days-MS    ESOPHAGOGASTRODUODENOSCOPY N/A 5/31/2023    Procedure: EGD (ESOPHAGOGASTRODUODENOSCOPY) WITH DILATION;  Surgeon: Santana Brown Jr., MD;  Location: [facility] OR [address] [place] [address];  Service: General;  Laterality: N/A;    ESOPHAGOGASTRODUODENOSCOPY N/A 5/3/2024    Procedure: EGD  (ESOPHAGOGASTRODUODENOSCOPY) with botox injection;  Surgeon: Santana Brown Jr., MD;  [facility];  Service: General;  Laterality: N/A;    INJECTION OF BOTULINUM TOXIN TYPE A  5/3/2024    Procedure: INJECTION, BOTULINUM TOXIN, TYPE A;  Surgeon: Santana Brown Jr., MD;  [facility];  Service: General;;    LASER LITHOTRIPSY Left 2/25/2022    Procedure: LITHOTRIPSY, USING LASER;  Surgeon: Lukasz Hughes MD;  [facility];  Service: Urology;  Laterality: Left;    LEFT HEART CATHETERIZATION Left 9/30/2020    Procedure: Left heart cath;  Surgeon: John West MD;  Location: [facility] CATH LAB;  Service: Cardiology;  Laterality: Left;    LITHOTRIPSY      PARATHYROIDECTOMY  1/1/2-107    PYELOSCOPY Left 2/25/2022    Procedure: PYELOSCOPY;  Surgeon: Lukasz Hughes MD;  Location: [address];  Service: Urology;  Laterality: Left;    ROBOT-ASSISTED SURGICAL REMOVAL OF ESOPHAGUS USING DA CARLOS XI N/A 3/14/2023    Procedure: XI ROBOTIC ESOPHAGECTOMY;  Surgeon: Santana Brown Jr., MD;  Location: [address];  Service: General;  Laterality: N/A;  Abdomen, Chest and Neck    ROBOTIC JEJUNOSTOMY N/A 3/14/2023    Procedure: ROBOTIC JEJUNOSTOMY TUBE INSERTION;  Surgeon: Santana Brown Jr., MD;  Location: [address];  Service: General;  Laterality: N/A;    TRANSESOPHAGEAL ECHOCARDIOGRAPHY N/A 4/7/2022    Procedure: ECHOCARDIOGRAM, TRANSESOPHAGEAL;  Surgeon: [facility] Diagnostic Provider;  Location: [facility] EP LAB;  Service: Cardiology;  Laterality: N/A;    TREATMENT OF CARDIAC ARRHYTHMIA N/A 4/7/2022    Procedure: Cardioversion or Defibrillation;  Surgeon: Iris Fisher NP;  Location: [facility] EP LAB;  Service: Cardiology;  Laterality: N/A;  afib, dccv, artie, anes, EH, 3prep    URETEROSCOPIC REMOVAL OF URETERIC CALCULUS Left 2/25/2022    Procedure: REMOVAL, CALCULUS, URETER, URETEROSCOPIC;  Surgeon: Lukasz Hughes MD;  Location: [facility] OR [address] [place] [address];  Service: Urology;   Laterality: Left;    URETEROSCOPY Left 2/25/2022    Procedure: URETEROSCOPY;  Surgeon: Lukasz Hughes MD;  Location: [facility] OR 1ST FLR;  Service: Urology;  Laterality: Left;    VOCAL CORD INJECTION Left 3/17/2023    Procedure: INJECTION, VOCAL CORD, LARYNGOSCOPIC;  Surgeon: Gm Altman MD;  Location: [facility] OR 2ND FLR;  Service: ENT;  Laterality: Left;     Current Outpatient Medications on File Prior to Visit   Medication Sig Dispense Refill    allopurinoL (ZYLOPRIM) 100 MG tablet TAKE 1 TABLET BY MOUTH EVERY DAY 30 tablet 10    blood sugar diagnostic (ACCU-CHEK ANTONELLA PLUS TEST STRP) Strp Use to test CBG four times daily E11.65 400 strip 3    blood-glucose meter kit Checks blood sugars 1x/daily. 1 each 12    hydroCHLOROthiazide (HYDRODIURIL) 25 MG tablet Take 1 tablet (25 mg total) by mouth once daily. 30 tablet 11    lancets (ACCU-CHEK SOFTCLIX LANCETS) Misc USE 1 EVERY DAY OR AS DIRECTED 100 each 3    losartan (COZAAR) 25 MG tablet Take 1 tablet (25 mg total) by mouth 2 (two) times a day. 180 tablet 3    metoprolol succinate (TOPROL-XL) 25 MG 24 hr tablet Take 0.5 tablets (12.5 mg total) by mouth once daily. 15 tablet 11    nitroGLYCERIN (NITROSTAT) 0.4 MG SL tablet Place 1 tablet (0.4 mg total) under the tongue every 5 (five) minutes as needed for Chest pain. If you need a third tablet, call 911 100 tablet 3    omeprazole (PRILOSEC) 40 MG capsule TAKE 1 CAPSULE(40 MG) BY MOUTH EVERY DAY 90 capsule 3    rosuvastatin (CRESTOR) 20 MG tablet Take 1 tablet (20 mg total) by mouth every evening. 90 tablet 3    tamsulosin (FLOMAX) 0.4 mg Cap TAKE 1 CAPSULE(0.4 MG) BY MOUTH EVERY DAY 30 capsule 11    testosterone (ANDROGEL) 20.25 mg/1.25 gram (1.62 %) GlPm Apply 4 pumps to shoulders daily 2 each 5    triamcinolone acetonide 0.1% (KENALOG) 0.1 % cream APPLY TOPICALLY TO THE AFFECTED AREA TWICE DAILY 45 g 2    warfarin (COUMADIN) 4 MG tablet 6mg PO Sun, Tues, Thurs and 4mg PO all other days -- OR AS DIRECTED BY  [facility] CLINIC      clotrimazole (LOTRIMIN) 1 % cream Apply topically once daily. for 14 days 45 g 0     No current facility-administered medications on file prior to visit.     Review of patient's allergies indicates:  No Known Allergies    Review of Systems   Constitutional: Negative for chills, decreased appetite, fever, malaise/fatigue, night sweats, weight gain and weight loss.   Cardiovascular:  Negative for chest pain, claudication, dyspnea on exertion, leg swelling, palpitations and syncope.   Respiratory:  Negative for cough and shortness of breath.    Endocrine: Negative for cold intolerance and heat intolerance.   Hematologic/Lymphatic: Negative for bleeding problem. Does not bruise/bleed easily.   Skin:  Positive for nail changes. Negative for color change, dry skin, flushing, itching, poor wound healing, rash, skin cancer, suspicious lesions and unusual hair distribution.   Musculoskeletal:  Positive for arthritis and stiffness. Negative for back pain, falls, gout, joint pain, joint swelling, muscle cramps, muscle weakness, myalgias and neck pain.   Gastrointestinal:  Negative for diarrhea, nausea and vomiting.   Neurological:  Positive for numbness and paresthesias. Negative for dizziness, focal weakness, light-headedness, tremors, vertigo and weakness.   Psychiatric/Behavioral:  Negative for altered mental status and depression. The patient does not have insomnia.    Allergic/Immunologic: Negative.            Objective:       Vitals:    06/11/24 1049   BP: 120/66   Pulse: (!) 52   Resp: 18   Weight: 89.8 kg (198 lb)   Height: 6' (1.829 m)   PainSc: 0-No pain   89.8 kg (198 lb)     Physical Exam  Vitals reviewed.   Constitutional:       General: He is not in acute distress.     Appearance: He is well-developed. He is not ill-appearing, toxic-appearing or diaphoretic.      Comments: Proper supportive shoegear   Cardiovascular:      Pulses:           Dorsalis pedis pulses are 2+ on the right side and 2+  on the left side.        Posterior tibial pulses are 1+ on the right side and 1+ on the left side.   Musculoskeletal:      Right lower leg: No tenderness. 1+ Edema present.      Left lower leg: No tenderness. 1+ Edema present.      Right foot: Decreased range of motion. Deformity, bunion and prominent metatarsal heads present. No tenderness or bony tenderness.      Left foot: Decreased range of motion. Deformity, bunion and prominent metatarsal heads present. No tenderness or bony tenderness.      Comments:     Feet:      Right foot:      Protective Sensation: 10 sites tested.  6 sites sensed.      Skin integrity: Dry skin present. No ulcer, blister, skin breakdown, erythema, warmth, callus or fissure.      Toenail Condition: Right toenails are abnormally thick and long. Fungal disease present.     Left foot:      Protective Sensation: 10 sites tested.  6 sites sensed.      Skin integrity: Dry skin present. No ulcer, blister, skin breakdown, erythema, warmth, callus or fissure.      Toenail Condition: Left toenails are abnormally thick and long. Fungal disease present.     Comments:   No open lesions    SWM decreased to digits and forefoot    Nails 1-10 thickened elongated discolored  .       No fissures noted today    + peeling skin     Skin:     General: Skin is warm and dry.      Capillary Refill: Capillary refill takes 2 to 3 seconds.      Coloration: Skin is not pale.      Findings: No erythema or rash.      Nails: There is no clubbing.   Neurological:      Mental Status: He is alert and oriented to person, place, and time.      Sensory: Sensory deficit present.      Gait: Gait abnormal.   Psychiatric:         Attention and Perception: Attention normal.         Mood and Affect: Mood normal.         Speech: Speech normal.         Behavior: Behavior normal.         Thought Content: Thought content normal.         Judgment: Judgment normal.               Assessment:       Encounter Diagnoses   Name Primary?     Diabetic polyneuropathy associated with type 2 diabetes mellitus Yes    Onychomycosis due to dermatophyte     Immunodeficiency secondary to neoplasm     Bilateral lower extremity edema                  Plan:       Lukasz was seen today for diabetic foot exam, nail problem, nail care, foot problem and toe pain.    Diagnoses and all orders for this visit:    Diabetic polyneuropathy associated with type 2 diabetes mellitus    Onychomycosis due to dermatophyte    Immunodeficiency secondary to neoplasm    Bilateral lower extremity edema              I counseled the patient on his conditions, their implications and medical management.     Feet doing well     - Shoe inspection. Diabetic Foot Education. Patient reminded of the importance of good nutrition and blood sugar control to help prevent podiatric complications of diabetes. Patient instructed on proper foot hygeine. We discussed wearing proper shoe gear, daily foot inspections, never walking without protective shoe gear, never putting sharp instruments to feet      With patient's permission, the toenails mentioned above were aggressively reduced and debrided using a nail nipper, removing all offending nail and debris.        F/u 3 months sooner if need

## 2024-06-18 ENCOUNTER — LAB VISIT (OUTPATIENT)
Dept: LAB | Facility: HOSPITAL | Age: 69
End: 2024-06-18
Attending: INTERNAL MEDICINE
Payer: MEDICARE

## 2024-06-18 ENCOUNTER — ANTI-COAG VISIT (OUTPATIENT)
Dept: CARDIOLOGY | Facility: CLINIC | Age: 69
End: 2024-06-18
Payer: MEDICARE

## 2024-06-18 DIAGNOSIS — I48.0 PAROXYSMAL ATRIAL FIBRILLATION: Primary | Chronic | ICD-10-CM

## 2024-06-18 DIAGNOSIS — Z79.01 LONG TERM (CURRENT) USE OF ANTICOAGULANTS: ICD-10-CM

## 2024-06-18 LAB
INR PPP: 4.2 (ref 0.8–1.2)
PROTHROMBIN TIME: 42.3 SEC (ref 9–12.5)

## 2024-06-18 PROCEDURE — 85610 PROTHROMBIN TIME: CPT | Performed by: INTERNAL MEDICINE

## 2024-06-18 PROCEDURE — 93793 ANTICOAG MGMT PT WARFARIN: CPT | Mod: ,,,

## 2024-06-18 PROCEDURE — 36415 COLL VENOUS BLD VENIPUNCTURE: CPT | Performed by: INTERNAL MEDICINE

## 2024-06-18 RX ORDER — ALLOPURINOL 100 MG/1
TABLET ORAL
Qty: 90 TABLET | Refills: 0 | Status: SHIPPED | OUTPATIENT
Start: 2024-06-18

## 2024-06-18 NOTE — PROGRESS NOTES
[facility]    06/18/2024    Lukasz Person (69 y.o.) is followed by the [facility].      Assessment/Plan:    Lukasz Person presents with a supratherapeutic INR. Goal INR: 2.0-3.0    Lab Results   Component Value Date    INR 4.2 (H) 06/18/2024    INR 2.6 (H) 06/04/2024    INR 4.2 (H) 05/28/2024       Assessment of patient findings per MA/LPN and chart review:   The following significant findings were found:   None    Recommendation for patient's warfarin regimen:   Due to supratherapeutic INR, patient was instructed to SKIP their next 1 warfarin doses. Weekly warfarin dose decreased due to repeated supratherapeutic INRs.    Recommended repeat INR in 1 week      Rona EscotoD, BCPS  Clinical Pharmacist - [facility]  Preferred Contact: Secure Messaging or In Basket Message

## 2024-06-18 NOTE — TELEPHONE ENCOUNTER
Provider Staff:  Action required for this patient    Requires labs      Please see care gap opportunities below in Care Due Message.    Thanks!  [facility]     Appointments      Date Provider   Last Visit   4/29/2024 Kirk Wilson MD   Next Visit   7/22/2024 Kirk Wilson MD     Refill Decision Note   Lukasz Naya  is requesting a refill authorization.  Brief Assessment and Rationale for Refill:  Approve     Medication Therapy Plan:         Comments:     Note composed:7:25 AM 06/18/2024

## 2024-06-18 NOTE — TELEPHONE ENCOUNTER
Care Due:                  Date            Visit Type   Department     Provider  --------------------------------------------------------------------------------                                EP -                              PRIMARY      MET INTERNAL  Last Visit: 04-      CARE ([facility])   MEDICINE       Kirk Wilson  Next Visit: None Scheduled  None         None Found                                                            Last  Test          Frequency    Reason                     Performed    Due Date  --------------------------------------------------------------------------------    Uric Acid...  12 months..  allopurinoL..............  07- 07-    [facility] Embedded Care Due Messages. Reference number: [id number].   6/18/2024 5:28:36 AM CDT

## 2024-06-27 ENCOUNTER — PATIENT MESSAGE (OUTPATIENT)
Dept: CARDIOLOGY | Facility: CLINIC | Age: 69
End: 2024-06-27

## 2024-06-27 ENCOUNTER — ANTI-COAG VISIT (OUTPATIENT)
Dept: CARDIOLOGY | Facility: CLINIC | Age: 69
End: 2024-06-27
Payer: MEDICARE

## 2024-06-27 ENCOUNTER — LAB VISIT (OUTPATIENT)
Dept: LAB | Facility: HOSPITAL | Age: 69
End: 2024-06-27
Attending: INTERNAL MEDICINE
Payer: MEDICARE

## 2024-06-27 DIAGNOSIS — Z79.01 LONG TERM (CURRENT) USE OF ANTICOAGULANTS: ICD-10-CM

## 2024-06-27 DIAGNOSIS — I48.0 PAROXYSMAL ATRIAL FIBRILLATION: Primary | Chronic | ICD-10-CM

## 2024-06-27 LAB
INR PPP: 2.4 (ref 0.8–1.2)
PROTHROMBIN TIME: 24.4 SEC (ref 9–12.5)

## 2024-06-27 PROCEDURE — 85610 PROTHROMBIN TIME: CPT | Performed by: INTERNAL MEDICINE

## 2024-06-27 PROCEDURE — 93793 ANTICOAG MGMT PT WARFARIN: CPT | Mod: ,,,

## 2024-06-27 PROCEDURE — 36415 COLL VENOUS BLD VENIPUNCTURE: CPT | Performed by: INTERNAL MEDICINE

## 2024-06-27 NOTE — PROGRESS NOTES
[facility]    06/27/2024    Lukasz Person (69 y.o.) is followed by the [facility].      Assessment/Plan:    Lukasz Person presents with a therapeutic INR. Goal INR: 2.0-3.0    Lab Results   Component Value Date    INR 2.4 (H) 06/27/2024    INR 4.2 (H) 06/18/2024    INR 2.6 (H) 06/04/2024       Assessment of patient findings per MA/LPN and chart review:   The following significant findings were found:   none    Recommendation for patient's warfarin regimen:   No change was made to warfarin therapy during this visit and patient has been instructed to continue their current warfarin regimen.    Recommended repeat INR in 2 weeks      Melissa Kinsey, PharmD, BCPS  Clinical Pharmacist - [facility]  Preferred Contact: Secure Messaging or In Basket Message

## 2024-06-28 RX ORDER — HYDROCHLOROTHIAZIDE 25 MG/1
25 TABLET ORAL
Qty: 30 TABLET | Refills: 11 | Status: SHIPPED | OUTPATIENT
Start: 2024-06-28

## 2024-07-01 ENCOUNTER — INFUSION (OUTPATIENT)
Dept: INFUSION THERAPY | Facility: HOSPITAL | Age: 69
End: 2024-07-01
Attending: INTERNAL MEDICINE
Payer: MEDICARE

## 2024-07-01 DIAGNOSIS — C15.9 ESOPHAGEAL ADENOCARCINOMA: Primary | ICD-10-CM

## 2024-07-01 PROCEDURE — 96523 IRRIG DRUG DELIVERY DEVICE: CPT

## 2024-07-01 PROCEDURE — 63600175 PHARM REV CODE 636 W HCPCS: Performed by: INTERNAL MEDICINE

## 2024-07-01 PROCEDURE — A4216 STERILE WATER/SALINE, 10 ML: HCPCS | Performed by: INTERNAL MEDICINE

## 2024-07-01 PROCEDURE — 25000003 PHARM REV CODE 250: Performed by: INTERNAL MEDICINE

## 2024-07-01 RX ORDER — HEPARIN 100 UNIT/ML
500 SYRINGE INTRAVENOUS
Status: DISCONTINUED | OUTPATIENT
Start: 2024-07-01 | End: 2024-07-01 | Stop reason: HOSPADM

## 2024-07-01 RX ORDER — SODIUM CHLORIDE 0.9 % (FLUSH) 0.9 %
10 SYRINGE (ML) INJECTION
Status: DISCONTINUED | OUTPATIENT
Start: 2024-07-01 | End: 2024-07-01 | Stop reason: HOSPADM

## 2024-07-01 RX ADMIN — HEPARIN 500 UNITS: 100 SYRINGE at 09:07

## 2024-07-01 RX ADMIN — SODIUM CHLORIDE, PRESERVATIVE FREE 10 ML: 5 INJECTION INTRAVENOUS at 09:07

## 2024-07-02 ENCOUNTER — PATIENT MESSAGE (OUTPATIENT)
Dept: HEMATOLOGY/ONCOLOGY | Facility: CLINIC | Age: 69
End: 2024-07-02
Payer: MEDICARE

## 2024-07-08 ENCOUNTER — PATIENT MESSAGE (OUTPATIENT)
Dept: HEMATOLOGY/ONCOLOGY | Facility: CLINIC | Age: 69
End: 2024-07-08
Payer: MEDICARE

## 2024-07-16 NOTE — PROGRESS NOTES
Oncology Nutrition Assessment for Medical Nutrition Therapy  Follow-up Visit    Lukasz Person   1955    Referring Provider: No ref. provider found      Reason for Visit: nutrition counseling and education    The patient location is: [place]  The chief complaint leading to consultation is: nutrition follow-up    Visit type: audiovisual    Face to Face time with patient: 15 minutes  20 minutes of total time spent on the encounter, which includes face to face time and non-face to face time preparing to see the patient (eg, review of tests), Obtaining and/or reviewing separately obtained history, Documenting clinical information in the electronic or other health record, Independently interpreting results (not separately reported) and communicating results to the patient/family/caregiver, or Care coordination (not separately reported).     Each patient to whom he or she provides medical services by telemedicine is:  (1) informed of the relationship between the physician and patient and the respective role of any other health care provider with respect to management of the patient; and (2) notified that he or she may decline to receive medical services by telemedicine and may withdraw from such care at any time.    PMHx:   Past Medical History:   Diagnosis Date    Anticoagulant long-term use     Cancer     Diabetes mellitus     Onset late 50s/early 60s    Hyperlipidemia     Hypertension     Onset late 50s/early 60s    Kidney stones     Sleep apnea     since 2006       Nutrition Assessment    This is a 69 y.o.male with a medical diagnosis of esophageal adenocarcinoma s/p neoadjuvant chemoradiation and now s/p esophagectomy 3/2023 followed by adjuvant Opdivo. He is known to me from previous appointments. He has actually gained some weight since our last visit ~6 months ago. He reports he continues to eat well. He reports intermittent issues with food feeling like it is slow to go down but not getting stuck. He feels it is  likely related to eating too much and/or too fast. He is working to eat smaller amounts and more frequently (about every 2 hours). He also has some complaints coughing up a significant amount of white foam before meals (usually a problem in the later morning and evening). Sounds as if it could be saliva. He denies coughing/choking/trouble breathing.     Weight: 89.8 kg (198 lb) - 6/11 clinic weight  Height: 6' (182.9 cm)  BMI: 26.9    Usual BW: 260-265lb  Weight Change: 30lb loss over 3 months (15lb of which were lost after surgery); another 20lb loss over 1.5 months - since maintaining and actually regained ~10lb    Allergies: Patient has no known allergies.    Current Medications:  Current Outpatient Medications:     allopurinoL (ZYLOPRIM) 100 MG tablet, TAKE 1 TABLET BY MOUTH EVERY DAY, Disp: 90 tablet, Rfl: 0    blood sugar diagnostic (ACCU-CHEK ANTONELLA PLUS TEST STRP) Strp, Use to test CBG four times daily E11.65, Disp: 400 strip, Rfl: 3    blood-glucose meter kit, Checks blood sugars 1x/daily., Disp: 1 each, Rfl: 12    clotrimazole (LOTRIMIN) 1 % cream, Apply topically once daily. for 14 days, Disp: 45 g, Rfl: 0    hydroCHLOROthiazide (HYDRODIURIL) 25 MG tablet, TAKE 1 TABLET(25 MG) BY MOUTH EVERY DAY, Disp: 30 tablet, Rfl: 11    lancets (ACCU-CHEK SOFTCLIX LANCETS) Misc, USE 1 EVERY DAY OR AS DIRECTED, Disp: 100 each, Rfl: 3    losartan (COZAAR) 25 MG tablet, Take 1 tablet (25 mg total) by mouth 2 (two) times a day., Disp: 180 tablet, Rfl: 3    metoprolol succinate (TOPROL-XL) 25 MG 24 hr tablet, Take 0.5 tablets (12.5 mg total) by mouth once daily., Disp: 15 tablet, Rfl: 11    nitroGLYCERIN (NITROSTAT) 0.4 MG SL tablet, Place 1 tablet (0.4 mg total) under the tongue every 5 (five) minutes as needed for Chest pain. If you need a third tablet, call 911, Disp: 100 tablet, Rfl: 3    omeprazole (PRILOSEC) 40 MG capsule, TAKE 1 CAPSULE(40 MG) BY MOUTH EVERY DAY, Disp: 90 capsule, Rfl: 3    rosuvastatin (CRESTOR) 20  MG tablet, Take 1 tablet (20 mg total) by mouth every evening., Disp: 90 tablet, Rfl: 3    tamsulosin (FLOMAX) 0.4 mg Cap, TAKE 1 CAPSULE(0.4 MG) BY MOUTH EVERY DAY, Disp: 30 capsule, Rfl: 11    testosterone (ANDROGEL) 20.25 mg/1.25 gram (1.62 %) GlPm, Apply 4 pumps to shoulders daily, Disp: 2 each, Rfl: 5    triamcinolone acetonide 0.1% (KENALOG) 0.1 % cream, APPLY TOPICALLY TO THE AFFECTED AREA TWICE DAILY, Disp: 45 g, Rfl: 2    warfarin (COUMADIN) 4 MG tablet, 6mg PO Sun, Tues, Thurs and 4mg PO all other days -- OR AS DIRECTED BY COUMADIN CLINIC, Disp: , Rfl:     Food/medication interactions noted: none    Vitamins/Supplements: none    Labs: Reviewed    Nutrition Diagnosis    Problem: moderate malnutrition  Etiology (related to): appetite loss and early satiety  Signs/Symptoms (as evidenced by): reports of inadequate PO intake, weight loss, and both fat & muscle wasting present  Status: Resolved    Nutrition Intervention    Nutrition Prescription   2245kcals (25kcal/kg)  90g protein (1g/kg)   2245mL fluid (25mL/kg)    Recommendations:  Continue eating small frequent meals/snacks  Slow down pace of eating, take smaller bites, and chew well  Make meals/snacks high in calories and protein - examples discussed  Continue to drink at least 64oz fluid/day  Advised patient to monitor white foam and reach out to surg onc if worsens    Materials Provided/Reviewed: none    Nutrition Monitoring and Evaluation    Monitor: diet education needs, energy intake, and weight status    Goals: weight maintenance    Follow up:  1 year    Communication to referring provider/care team: note available in chart; discussed with NIRAV Zhang NP     Counseling time: 15 minutes      Carmen Clark, MS, RD, LDN  Senior Clinical Dietitian  [facility] [place]

## 2024-07-17 ENCOUNTER — ANTI-COAG VISIT (OUTPATIENT)
Dept: CARDIOLOGY | Facility: CLINIC | Age: 69
End: 2024-07-17
Payer: MEDICARE

## 2024-07-17 ENCOUNTER — CLINICAL SUPPORT (OUTPATIENT)
Dept: HEMATOLOGY/ONCOLOGY | Facility: CLINIC | Age: 69
End: 2024-07-17
Payer: MEDICARE

## 2024-07-17 ENCOUNTER — LAB VISIT (OUTPATIENT)
Dept: LAB | Facility: HOSPITAL | Age: 69
End: 2024-07-17
Payer: MEDICARE

## 2024-07-17 ENCOUNTER — RESEARCH ENCOUNTER (OUTPATIENT)
Dept: RESEARCH | Facility: HOSPITAL | Age: 69
End: 2024-07-17
Payer: MEDICARE

## 2024-07-17 ENCOUNTER — OFFICE VISIT (OUTPATIENT)
Dept: HEMATOLOGY/ONCOLOGY | Facility: CLINIC | Age: 69
End: 2024-07-17
Payer: MEDICARE

## 2024-07-17 ENCOUNTER — PATIENT MESSAGE (OUTPATIENT)
Dept: CARDIOLOGY | Facility: CLINIC | Age: 69
End: 2024-07-17

## 2024-07-17 VITALS
DIASTOLIC BLOOD PRESSURE: 74 MMHG | BODY MASS INDEX: 27.83 KG/M2 | WEIGHT: 205.5 LBS | SYSTOLIC BLOOD PRESSURE: 150 MMHG | OXYGEN SATURATION: 96 % | HEIGHT: 72 IN | TEMPERATURE: 98 F | HEART RATE: 42 BPM

## 2024-07-17 DIAGNOSIS — N18.30 TYPE 2 DIABETES MELLITUS WITH STAGE 3 CHRONIC KIDNEY DISEASE, WITHOUT LONG-TERM CURRENT USE OF INSULIN, UNSPECIFIED WHETHER STAGE 3A OR 3B CKD: ICD-10-CM

## 2024-07-17 DIAGNOSIS — E11.59 HYPERTENSION ASSOCIATED WITH DIABETES: ICD-10-CM

## 2024-07-17 DIAGNOSIS — C15.9 ESOPHAGEAL ADENOCARCINOMA: ICD-10-CM

## 2024-07-17 DIAGNOSIS — I48.0 PAROXYSMAL ATRIAL FIBRILLATION: Primary | Chronic | ICD-10-CM

## 2024-07-17 DIAGNOSIS — E11.22 TYPE 2 DIABETES MELLITUS WITH STAGE 3 CHRONIC KIDNEY DISEASE, WITHOUT LONG-TERM CURRENT USE OF INSULIN, UNSPECIFIED WHETHER STAGE 3A OR 3B CKD: ICD-10-CM

## 2024-07-17 DIAGNOSIS — C15.9 ESOPHAGEAL ADENOCARCINOMA: Primary | ICD-10-CM

## 2024-07-17 DIAGNOSIS — I15.2 HYPERTENSION ASSOCIATED WITH DIABETES: ICD-10-CM

## 2024-07-17 DIAGNOSIS — Z79.899 ON ANTINEOPLASTIC CHEMOTHERAPY: ICD-10-CM

## 2024-07-17 DIAGNOSIS — I50.20 HFREF (HEART FAILURE WITH REDUCED EJECTION FRACTION): ICD-10-CM

## 2024-07-17 DIAGNOSIS — R13.19 ESOPHAGEAL DYSPHAGIA: ICD-10-CM

## 2024-07-17 DIAGNOSIS — Z00.6 CLINICAL TRIAL PARTICIPANT: ICD-10-CM

## 2024-07-17 DIAGNOSIS — Z71.3 NUTRITIONAL COUNSELING: Primary | ICD-10-CM

## 2024-07-17 DIAGNOSIS — J38.00 VOCAL CORD PARALYSIS: ICD-10-CM

## 2024-07-17 DIAGNOSIS — E78.5 HYPERLIPIDEMIA ASSOCIATED WITH TYPE 2 DIABETES MELLITUS: ICD-10-CM

## 2024-07-17 DIAGNOSIS — I25.10 CORONARY ARTERY DISEASE INVOLVING NATIVE CORONARY ARTERY OF NATIVE HEART WITHOUT ANGINA PECTORIS: ICD-10-CM

## 2024-07-17 DIAGNOSIS — E11.69 HYPERLIPIDEMIA ASSOCIATED WITH TYPE 2 DIABETES MELLITUS: ICD-10-CM

## 2024-07-17 DIAGNOSIS — I48.0 PAROXYSMAL ATRIAL FIBRILLATION: ICD-10-CM

## 2024-07-17 DIAGNOSIS — D69.6 THROMBOCYTOPENIA: ICD-10-CM

## 2024-07-17 DIAGNOSIS — Z79.01 LONG TERM (CURRENT) USE OF ANTICOAGULANTS: ICD-10-CM

## 2024-07-17 LAB
ALBUMIN SERPL BCP-MCNC: 3.7 G/DL (ref 3.5–5.2)
ALP SERPL-CCNC: 91 U/L (ref 55–135)
ALT SERPL W/O P-5'-P-CCNC: 21 U/L (ref 10–44)
ANION GAP SERPL CALC-SCNC: 8 MMOL/L (ref 8–16)
AST SERPL-CCNC: 20 U/L (ref 10–40)
BASOPHILS # BLD AUTO: 0.02 K/UL (ref 0–0.2)
BASOPHILS NFR BLD: 0.5 % (ref 0–1.9)
BILIRUB DIRECT SERPL-MCNC: 0.3 MG/DL (ref 0.1–0.3)
BILIRUB SERPL-MCNC: 0.7 MG/DL (ref 0.1–1)
BUN SERPL-MCNC: 12 MG/DL (ref 8–23)
CALCIUM SERPL-MCNC: 8.9 MG/DL (ref 8.7–10.5)
CHLORIDE SERPL-SCNC: 104 MMOL/L (ref 95–110)
CO2 SERPL-SCNC: 31 MMOL/L (ref 23–29)
CORTIS SERPL-MCNC: 7.9 UG/DL
CREAT SERPL-MCNC: 1 MG/DL (ref 0.5–1.4)
DIFFERENTIAL METHOD BLD: ABNORMAL
EOSINOPHIL # BLD AUTO: 0.3 K/UL (ref 0–0.5)
EOSINOPHIL NFR BLD: 6.5 % (ref 0–8)
ERYTHROCYTE [DISTWIDTH] IN BLOOD BY AUTOMATED COUNT: 13.7 % (ref 11.5–14.5)
EST. GFR  (NO RACE VARIABLE): >60 ML/MIN/1.73 M^2
GLUCOSE SERPL-MCNC: 159 MG/DL (ref 70–110)
HCT VFR BLD AUTO: 39.2 % (ref 40–54)
HGB BLD-MCNC: 12.6 G/DL (ref 14–18)
IMM GRANULOCYTES # BLD AUTO: 0.01 K/UL (ref 0–0.04)
IMM GRANULOCYTES NFR BLD AUTO: 0.2 % (ref 0–0.5)
INR PPP: 2.3 (ref 0.8–1.2)
LYMPHOCYTES # BLD AUTO: 0.6 K/UL (ref 1–4.8)
LYMPHOCYTES NFR BLD: 15.5 % (ref 18–48)
MAGNESIUM SERPL-MCNC: 2.1 MG/DL (ref 1.6–2.6)
MCH RBC QN AUTO: 30.6 PG (ref 27–31)
MCHC RBC AUTO-ENTMCNC: 32.1 G/DL (ref 32–36)
MCV RBC AUTO: 95 FL (ref 82–98)
MONOCYTES # BLD AUTO: 0.5 K/UL (ref 0.3–1)
MONOCYTES NFR BLD: 12.2 % (ref 4–15)
NEUTROPHILS # BLD AUTO: 2.6 K/UL (ref 1.8–7.7)
NEUTROPHILS NFR BLD: 65.1 % (ref 38–73)
NRBC BLD-RTO: 0 /100 WBC
PHOSPHATE SERPL-MCNC: 2.8 MG/DL (ref 2.7–4.5)
PLATELET # BLD AUTO: 122 K/UL (ref 150–450)
PMV BLD AUTO: 10.2 FL (ref 9.2–12.9)
POTASSIUM SERPL-SCNC: 4.2 MMOL/L (ref 3.5–5.1)
PROT SERPL-MCNC: 6.8 G/DL (ref 6–8.4)
PROTHROMBIN TIME: 24 SEC (ref 9–12.5)
RBC # BLD AUTO: 4.12 M/UL (ref 4.6–6.2)
SODIUM SERPL-SCNC: 143 MMOL/L (ref 136–145)
TSH SERPL DL<=0.005 MIU/L-ACNC: 1.44 UIU/ML (ref 0.4–4)
WBC # BLD AUTO: 4.01 K/UL (ref 3.9–12.7)

## 2024-07-17 PROCEDURE — 99214 OFFICE O/P EST MOD 30 MIN: CPT | Mod: PBBFAC | Performed by: INTERNAL MEDICINE

## 2024-07-17 PROCEDURE — 82248 BILIRUBIN DIRECT: CPT | Performed by: INTERNAL MEDICINE

## 2024-07-17 PROCEDURE — 84100 ASSAY OF PHOSPHORUS: CPT | Performed by: INTERNAL MEDICINE

## 2024-07-17 PROCEDURE — 80053 COMPREHEN METABOLIC PANEL: CPT | Performed by: INTERNAL MEDICINE

## 2024-07-17 PROCEDURE — 36415 COLL VENOUS BLD VENIPUNCTURE: CPT | Performed by: INTERNAL MEDICINE

## 2024-07-17 PROCEDURE — 82533 TOTAL CORTISOL: CPT | Performed by: INTERNAL MEDICINE

## 2024-07-17 PROCEDURE — 84443 ASSAY THYROID STIM HORMONE: CPT | Performed by: INTERNAL MEDICINE

## 2024-07-17 PROCEDURE — 83735 ASSAY OF MAGNESIUM: CPT | Performed by: INTERNAL MEDICINE

## 2024-07-17 PROCEDURE — 85025 COMPLETE CBC W/AUTO DIFF WBC: CPT | Performed by: INTERNAL MEDICINE

## 2024-07-17 PROCEDURE — 97803 MED NUTRITION INDIV SUBSEQ: CPT | Mod: 95,,, | Performed by: DIETITIAN, REGISTERED

## 2024-07-17 PROCEDURE — 85610 PROTHROMBIN TIME: CPT | Performed by: INTERNAL MEDICINE

## 2024-07-17 PROCEDURE — 82024 ASSAY OF ACTH: CPT | Performed by: INTERNAL MEDICINE

## 2024-07-17 NOTE — PROGRESS NOTES
[facility]    2024 2:15 PM    Assessment/Plan:    Patient presents today with therapeutic INR.    Assessment of patient findings and chart review: reviewed    Recommendation for patient's warfarin regimen: Continue current maintenance dose    Recommend repeat INR in 3 weeks  _________________________________________________________________    Lukasz Person (69 y.o.) is followed by the [facility].    Anticoagulation Summary  As of 2024      INR goal:  2.0-3.0   TTR:  46.1% (5.6 mo)   INR used for dosin.3 (2024)   Warfarin maintenance plan:  6 mg (4 mg x 1.5) every Sun, e, Thu; 4 mg (4 mg x 1) all other days   Weekly warfarin total:  34 mg   Plan last modified:  Melissa Kinsey, PharmD (2024)   Next INR check:  2024   Target end date:      Indications    Paroxysmal atrial fibrillation [I48.0]                 Anticoagulation Episode Summary       INR check location:      Preferred lab:      Send INR reminders to:  [facility] COUMADIN MONITORING POOL    Comments:  [facility]          Anticoagulation Care Providers       Provider Role Specialty Phone number    Pallavi Nick MD [facility] [phone]

## 2024-07-17 NOTE — PROGRESS NOTES
MEDICAL ONCOLOGY - ESTABLISHED PATIENT VISIT    Reason for visit: esophageal cancer    Best Contact Phone Number(s): There are no phone numbers on file.     Cancer/Stage/TNM:    Cancer Staging   Esophageal adenocarcinoma  Staging form: Esophagus - Adenocarcinoma, AJCC 8th Edition  - Pathologic stage from 3/28/2023: Stage II (ypT3, pN0, cM0, G2) - Signed by Santana Brown Jr., MD on 3/28/2023       Oncology History   Esophageal adenocarcinoma   12/8/2022 Initial Diagnosis    Esophageal adenocarcinoma     12/21/2022 - 12/21/2022 Chemotherapy    Treatment Summary   Plan Name: OP ESOPHAGEAL PACLITAXEL CARBOPLATIN WEEKLY  Treatment Goal: Curative  Status: Inactive  Start Date:   End Date:   Provider: Miki Medrano MD  Chemotherapy: CARBOplatin (PARAPLATIN) in sodium chloride 0.9% 250 mL chemo infusion, , Intravenous, Clinic/HOD 1 time, 0 of 1 cycle  PACLitaxeL (TAXOL) 50 mg/m2 = 120 mg in sodium chloride 0.9% 250 mL chemo infusion, 50 mg/m2, Intravenous, Clinic/HOD 1 time, 0 of 1 cycle     12/29/2022 - 1/20/2023 Chemotherapy    Treatment Summary   Plan Name: [facility] [id number] ARM B CARBOPLATIN PACLITAXEL NIVOLUMAB  Treatment Goal: Curative  Status: Inactive  Start Date: 12/29/2022  End Date: 1/20/2023  Provider: Miki Medrano MD  Chemotherapy: CARBOplatin (PARAPLATIN) 215 mg in sodium chloride 0.9% 306.5 mL chemo infusion, 215 mg, Intravenous, Clinic/HOD 1 time, 4 of 5 cycles  Administration: 215 mg (12/29/2022), 230 mg (1/5/2023), 265 mg (1/13/2023), 265 mg (1/20/2023)  PACLitaxeL (TAXOL) 50 mg/m2 = 120 mg in sodium chloride 0.9% 250 mL chemo infusion, 50 mg/m2 = 120 mg, Intravenous, Clinic/HOD 1 time, 4 of 5 cycles  Dose modification: 50 mg/m2 (original dose 50 mg/m2, Cycle 4)  Administration: 120 mg (12/29/2022), 120 mg (1/5/2023), 120 mg (1/13/2023), 120 mg (1/20/2023)     12/29/2022 - 2/1/2023 Radiation Therapy    Treating physician: Dr. Javier Moulton    Course: C1 CHEST 2022    Treatment Site Ref.  ID Energy Dose/Fx (Gy) #Fx Dose Correction (Gy) Total Dose (Gy) Start Date End Date Elapsed Days   IM Esophagus [id number] 6X 1.8 23 / 23 0 41.4 12/29/2022 2/1/2023 34          3/17/2023 Surgery    Procedure: Procedure(s) (LRB):  XI ROBOTIC ESOPHAGECTOMY (N/A)  ROBOTIC JEJUNOSTOMY TUBE INSERTION (N/A)      Surgeon(s) and Role:     * Santana Brown Jr., MD - Primary     * Darian Murphy MD - Assisting     Assistance: Due to having no qualified resident during critical portions of the case, Dr. Darian Murphy acted as an assistant.     Pre-Operative Diagnosis: Esophageal adenocarcinoma, distal 1/3     Post-Operative Diagnosis: Same     Pre-Operative Variables:  Stage: T3 N0  Adjacent organ involvement: None  Chemotherapy within 90 Days: Yes  Radiation Therapy within 90 Days: Yes     Comorbidities:  Morbid obesity  Type 2 diabetes mellitus  Obstructive sleep apnea  Gout  Hyperparathyroidism  Hypertension  Severe obesity  Coronary artery disease  Heart failure with reduced ejection fraction    Procedure:  Robotic-assisted Vj three field esophagectomy  Regional mediastinal lymph node dissection  Botox injection of the pylorus  Jejunostomy tube placement     Operative Findings:                No evidence of distant intraperitoneal metastases in the chest or abdomen  Esophageal tumor located in the distal third of the esophagus, mild radiation changes appreciated.  Resection status:  R0 pending final pathology.  No gross disease remaining post dissection.  Frozen sections on proximal and distal margins negative for malignancy  100u Botox injection into the pylorus  Stapled esophagogastrostomy performed at the neck incision after confirmation of negative margins.  Jejunostomy tube placed      3/28/2023 Cancer Staged    Staging form: Esophagus - Adenocarcinoma, AJCC 8th Edition  - Pathologic stage from 3/28/2023: Stage II (ypT3, pN0, cM0, G2)     5/1/2023 - 5/1/2023 Chemotherapy    Treatment Summary   Plan Name: [facility] [id number]  ARM C ADJUVANT NIVOLUMAB  Treatment Goal: Curative  Status: Inactive  Start Date: 5/1/2023  End Date: 5/1/2023  Provider: Miki Medrano MD  Chemotherapy: [No matching medication found in this treatment plan]     5/29/2023 -  Chemotherapy    Treatment Summary   Plan Name: [facility] [id number] ARM C ADJUVANT NIVOLUMAB STEP 2  Treatment Goal: Curative  Status: Active  Start Date: 5/29/2023  End Date: 4/29/2024 (Planned)  Provider: Miki Medrano MD  Chemotherapy: [No matching medication found in this treatment plan]          Interim History:   Mr. Person returns to clinic today for follow-up while on surveillance after completion of adjuvant nivolumab. He underwent robotic esophagectomy on 3/14/23 with Dr. Brown and J tube insertion.     He feels well.  He is swallowing better.  He is able to eat essentially whatever he wants.  Does not have dysphagia at this time.  When he overeats, he does have to vomit from time to time.  Energy is good.    Presents to clinic alone. ECOG 0.       Review of Systems   Constitutional:  Negative for chills, diaphoresis, fever, malaise/fatigue and weight loss.   HENT:  Negative for sore throat.    Eyes:  Negative for blurred vision and double vision.   Respiratory:  Negative for cough and shortness of breath.    Cardiovascular:  Negative for chest pain, palpitations and leg swelling.   Gastrointestinal:  Negative for abdominal pain, blood in stool, constipation, diarrhea, heartburn, nausea and vomiting.   Genitourinary:  Negative for dysuria, frequency, hematuria and urgency.   Musculoskeletal:  Negative for back pain, falls, myalgias and neck pain.   Skin:  Negative for itching and rash.   Neurological:  Negative for dizziness, tingling, weakness and headaches.   Psychiatric/Behavioral:  The patient is not nervous/anxious.      Past Medical History:   Past Medical History:   Diagnosis Date    Anticoagulant long-term use     Cancer     Diabetes mellitus     Onset late 50s/early 60s     Hyperlipidemia     Hypertension     Onset late 50s/early 60s    Kidney stones     Sleep apnea     since 2006      Past Surgical History:   Past Surgical History:   Procedure Laterality Date    COLONOSCOPY N/A 10/26/2023    Procedure: COLONOSCOPY;  Surgeon: Noah Duong MD;  Location: [facility] (4TH FLR);  Service: Endoscopy;  Laterality: N/A;  instructions sent to patient portal. Tb  referral: Dr. Wilson  Pt. on Xarelto--tb-ok to hold see te 8/15/23 dr vu  10/13-precall complete-MS  10/19 pt rescheduled, Xarelto hold, PEG, portal -ml  10/24-precall complete-KPvt    CORONARY ANGIOGRAPHY N/A 9/30/2020    Procedure: ANGIOGRAM, CORONARY ARTERY;  Surgeon: John West MD;  Location: [facility] CATH LAB;  Service: Cardiology;  Laterality: N/A;    CYSTOSCOPY N/A 2/17/2022    Procedure: CYSTOSCOPY;  Surgeon: Lukasz Hughes MD;  Location: [facility] OR 1ST FLR;  Service: Urology;  Laterality: N/A;    CYSTOSCOPY W/ URETERAL STENT PLACEMENT N/A 2/25/2022    Procedure: CYSTOSCOPY, WITH URETERAL STENT INSERTION;  Surgeon: Lukasz Hughes MD;  Location: [facility] OR 1ST FLR;  Service: Urology;  Laterality: N/A;    ENDOSCOPIC ULTRASOUND OF UPPER GASTROINTESTINAL TRACT N/A 12/7/2022    Procedure: ULTRASOUND, UPPER GI TRACT, ENDOSCOPIC;  Surgeon: Darian Main MD;  Location: [facility];  Service: Endoscopy;  Laterality: N/A;  Approval to hold Xarelto rec'd from Dr. Vu (see t/e 12/5/22)-DS    ESOPHAGOGASTRODUODENOSCOPY N/A 11/17/2022    Procedure: EGD (ESOPHAGOGASTRODUODENOSCOPY);  Surgeon: Brody Gonzales MD;  Location: [facility] (2ND FLR);  Service: Endoscopy;  Laterality: N/A;  inst via email-ok to hold Xarelto x 2 days-MS    ESOPHAGOGASTRODUODENOSCOPY N/A 5/31/2023    Procedure: EGD (ESOPHAGOGASTRODUODENOSCOPY) WITH DILATION;  Surgeon: Santana Brown Jr., MD;  Location: [facility] OR 2ND FLR;  Service: General;  Laterality: N/A;    ESOPHAGOGASTRODUODENOSCOPY N/A 5/3/2024    Procedure: EGD (ESOPHAGOGASTRODUODENOSCOPY)  with botox injection;  Surgeon: Santana Brown Jr., MD;  Location: [facility] OR 2ND FLR;  Service: General;  Laterality: N/A;    INJECTION OF BOTULINUM TOXIN TYPE A  5/3/2024    Procedure: INJECTION, BOTULINUM TOXIN, TYPE A;  Surgeon: Santana Brown Jr., MD;  Location: [facility] OR 2ND FLR;  Service: General;;    LASER LITHOTRIPSY Left 2/25/2022    Procedure: LITHOTRIPSY, USING LASER;  Surgeon: Lukasz Hughes MD;  [facility];  Service: Urology;  Laterality: Left;    LEFT HEART CATHETERIZATION Left 9/30/2020    Procedure: Left heart cath;  Surgeon: John West MD;  Location: [facility] CATH LAB;  Service: Cardiology;  Laterality: Left;    LITHOTRIPSY      PARATHYROIDECTOMY  1/1/2-107    PYELOSCOPY Left 2/25/2022    Procedure: PYELOSCOPY;  Surgeon: Lukasz Hughes MD;  Location: [address];  Service: Urology;  Laterality: Left;    ROBOT-ASSISTED SURGICAL REMOVAL OF ESOPHAGUS USING DA CARLOS XI N/A 3/14/2023    Procedure: XI ROBOTIC ESOPHAGECTOMY;  Surgeon: Santana Brown Jr., MD;  Location: [facility] OR [address] [place] [address];  Service: General;  Laterality: N/A;  Abdomen, Chest and Neck    ROBOTIC JEJUNOSTOMY N/A 3/14/2023    Procedure: ROBOTIC JEJUNOSTOMY TUBE INSERTION;  Surgeon: Santana Brown Jr., MD;  Location: [facility] OR [address] [place] [address];  Service: General;  Laterality: N/A;    TRANSESOPHAGEAL ECHOCARDIOGRAPHY N/A 4/7/2022    Procedure: ECHOCARDIOGRAM, TRANSESOPHAGEAL;  Surgeon: Xander Diagnostic Provider;  Location: [facility] EP LAB;  Service: Cardiology;  Laterality: N/A;    TREATMENT OF CARDIAC ARRHYTHMIA N/A 4/7/2022    Procedure: Cardioversion or Defibrillation;  Surgeon: Iris Fisher NP;  Location: [facility] EP LAB;  Service: Cardiology;  Laterality: N/A;  afib, dccv, artie, anes, EH, 3prep    URETEROSCOPIC REMOVAL OF URETERIC CALCULUS Left 2/25/2022    Procedure: REMOVAL, CALCULUS, URETER, URETEROSCOPIC;  Surgeon: Lukasz Hughes MD;  Location: [facility] OR [address] [place] [address];  Service: Urology;  Laterality: Left;    URETEROSCOPY  Left 2022    Procedure: URETEROSCOPY;  Surgeon: Lukasz Hughes MD;  Location: [facility] OR 1ST FLR;  Service: Urology;  Laterality: Left;    VOCAL CORD INJECTION Left 3/17/2023    Procedure: INJECTION, VOCAL CORD, LARYNGOSCOPIC;  Surgeon: Gm Altman MD;  Location: [facility] OR 2ND FLR;  Service: ENT;  Laterality: Left;      Family History:   Family History   Problem Relation Name Age of Onset    Arthritis Mother Juany     Heart disease Father Diomedes 70        CABG    Arthritis Father Diomedes     Diabetes Father Diomedes     Kidney disease Father Diomedes         had one kidney removed in early thirties    Arthritis Sister Dee     Arthritis Sister Josette     Hypertension Sister Delores     Colon cancer Brother Sterling 32    Arthritis Brother Diomedes     Alcohol abuse Paternal Aunt Betina     Cancer Maternal Grandfather Yaron         throat cancer    Diabetes Paternal Grandmother Cassandra     Colon polyps Paternal Grandfather Diomedes     Colon cancer Paternal Grandfather Diomedes     Cancer Paternal Grandfather Diomedes         colon cancer at age 62      Social History:   Social History     Tobacco Use    Smoking status: Former     Current packs/day: 0.00     Average packs/day: 1 pack/day for 22.7 years (22.7 ttl pk-yrs)     Types: Cigarettes, Cigars     Start date: 1972     Quit date:      Years since quittin.1     Passive exposure: Never    Smokeless tobacco: Never    Tobacco comments:     smoking was off and on.  cumulative 7 years.  never more than three years in one stretch or more lj   Substance Use Topics    Alcohol use: Yes     Alcohol/week: 4.0 standard drinks of alcohol     Types: 4 Shots of liquor per week      I have reviewed and updated the patient's past medical, surgical, family and social histories.    Allergies:   Review of patient's allergies indicates:  No Known Allergies     Medications:   Current Outpatient Medications   Medication Sig Dispense Refill     allopurinoL (ZYLOPRIM) 100 MG tablet TAKE 1 TABLET BY MOUTH EVERY DAY 90 tablet 0    blood sugar diagnostic (ACCU-CHEK ANTONELLA PLUS TEST STRP) Strp Use to test CBG four times daily E11.65 400 strip 3    blood-glucose meter kit Checks blood sugars 1x/daily. 1 each 12    hydroCHLOROthiazide (HYDRODIURIL) 25 MG tablet TAKE 1 TABLET(25 MG) BY MOUTH EVERY DAY 30 tablet 11    lancets (ACCU-CHEK SOFTCLIX LANCETS) Misc USE 1 EVERY DAY OR AS DIRECTED 100 each 3    losartan (COZAAR) 25 MG tablet Take 1 tablet (25 mg total) by mouth 2 (two) times a day. 180 tablet 3    metoprolol succinate (TOPROL-XL) 25 MG 24 hr tablet Take 0.5 tablets (12.5 mg total) by mouth once daily. 15 tablet 11    nitroGLYCERIN (NITROSTAT) 0.4 MG SL tablet Place 1 tablet (0.4 mg total) under the tongue every 5 (five) minutes as needed for Chest pain. If you need a third tablet, call 911 100 tablet 3    omeprazole (PRILOSEC) 40 MG capsule TAKE 1 CAPSULE(40 MG) BY MOUTH EVERY DAY 90 capsule 3    rosuvastatin (CRESTOR) 20 MG tablet Take 1 tablet (20 mg total) by mouth every evening. 90 tablet 3    tamsulosin (FLOMAX) 0.4 mg Cap TAKE 1 CAPSULE(0.4 MG) BY MOUTH EVERY DAY 30 capsule 11    testosterone (ANDROGEL) 20.25 mg/1.25 gram (1.62 %) GlPm Apply 4 pumps to shoulders daily 2 each 5    triamcinolone acetonide 0.1% (KENALOG) 0.1 % cream APPLY TOPICALLY TO THE AFFECTED AREA TWICE DAILY 45 g 2    warfarin (COUMADIN) 4 MG tablet 6mg PO Sun, Tues, Thurs and 4mg PO all other days -- OR AS DIRECTED BY COUMADIN CLINIC      clotrimazole (LOTRIMIN) 1 % cream Apply topically once daily. for 14 days 45 g 0     No current facility-administered medications for this visit.      Physical Exam:   BP (!) 150/74 (BP Location: Left arm, Patient Position: Sitting, BP Method: Medium (Automatic))   Pulse (!) 42   Temp 98.4 °F (36.9 °C) (Oral)   Ht 6' (1.829 m)   Wt 93.2 kg (205 lb 7.5 oz)   SpO2 96%   BMI 27.87 kg/m²      ECOG Performance status: 0    Physical Exam  Vitals  reviewed.   Constitutional:       General: He is not in acute distress.     Appearance: Normal appearance. He is not ill-appearing, toxic-appearing or diaphoretic.   HENT:      Head: Normocephalic and atraumatic.      Right Ear: External ear normal.      Left Ear: External ear normal.      Nose: Nose normal. No congestion.      Mouth/Throat:      Pharynx: Oropharynx is clear.   Eyes:      General: No scleral icterus.     Extraocular Movements: Extraocular movements intact.      Conjunctiva/sclera: Conjunctivae normal.      Pupils: Pupils are equal, round, and reactive to light.   Cardiovascular:      Rate and Rhythm: Normal rate and regular rhythm.   Pulmonary:      Effort: Pulmonary effort is normal. No respiratory distress.   Chest:      Comments: RCW port  Abdominal:      General: There is no distension.      Palpations: Abdomen is soft.      Tenderness: There is no abdominal tenderness.   Musculoskeletal:         General: No swelling.      Cervical back: Normal range of motion.   Lymphadenopathy:      Cervical: No cervical adenopathy.   Skin:     Coloration: Skin is not jaundiced.      Findings: No bruising, erythema or rash.   Neurological:      General: No focal deficit present.      Mental Status: He is alert and oriented to person, place, and time. Mental status is at baseline.      Cranial Nerves: No cranial nerve deficit.      Motor: No weakness.      Gait: Gait normal.   Psychiatric:         Mood and Affect: Mood normal.         Behavior: Behavior normal.         Thought Content: Thought content normal.         Judgment: Judgment normal.         Labs:   Recent Results (from the past 48 hour(s))   COMPREHENSIVE METABOLIC PANEL    Collection Time: 07/17/24  9:57 AM   Result Value Ref Range    Sodium 143 136 - 145 mmol/L    Potassium 4.2 3.5 - 5.1 mmol/L    Chloride 104 95 - 110 mmol/L    CO2 31 (H) 23 - 29 mmol/L    Glucose 159 (H) 70 - 110 mg/dL    BUN 12 8 - 23 mg/dL    Creatinine 1.0 0.5 - 1.4 mg/dL     Calcium 8.9 8.7 - 10.5 mg/dL    Total Protein 6.8 6.0 - 8.4 g/dL    Albumin 3.7 3.5 - 5.2 g/dL    Total Bilirubin 0.7 0.1 - 1.0 mg/dL    Alkaline Phosphatase 91 55 - 135 U/L    AST 20 10 - 40 U/L    ALT 21 10 - 44 U/L    eGFR >60.0 >60 mL/min/1.73 m^2    Anion Gap 8 8 - 16 mmol/L   Protime-INR    Collection Time: 07/17/24  9:57 AM   Result Value Ref Range    Prothrombin Time 24.0 (H) 9.0 - 12.5 sec    INR 2.3 (H) 0.8 - 1.2   CBC W/ AUTO DIFFERENTIAL    Collection Time: 07/17/24  9:57 AM   Result Value Ref Range    WBC 4.01 3.90 - 12.70 K/uL    RBC 4.12 (L) 4.60 - 6.20 M/uL    Hemoglobin 12.6 (L) 14.0 - 18.0 g/dL    Hematocrit 39.2 (L) 40.0 - 54.0 %    MCV 95 82 - 98 fL    MCH 30.6 27.0 - 31.0 pg    MCHC 32.1 32.0 - 36.0 g/dL    RDW 13.7 11.5 - 14.5 %    Platelets 122 (L) 150 - 450 K/uL    MPV 10.2 9.2 - 12.9 fL    Immature Granulocytes 0.2 0.0 - 0.5 %    Gran # (ANC) 2.6 1.8 - 7.7 K/uL    Immature Grans (Abs) 0.01 0.00 - 0.04 K/uL    Lymph # 0.6 (L) 1.0 - 4.8 K/uL    Mono # 0.5 0.3 - 1.0 K/uL    Eos # 0.3 0.0 - 0.5 K/uL    Baso # 0.02 0.00 - 0.20 K/uL    nRBC 0 0 /100 WBC    Gran % 65.1 38.0 - 73.0 %    Lymph % 15.5 (L) 18.0 - 48.0 %    Mono % 12.2 4.0 - 15.0 %    Eosinophil % 6.5 0.0 - 8.0 %    Basophil % 0.5 0.0 - 1.9 %    Differential Method Automated    Magnesium    Collection Time: 07/17/24  9:57 AM   Result Value Ref Range    Magnesium 2.1 1.6 - 2.6 mg/dL   PHOSPHORUS    Collection Time: 07/17/24  9:57 AM   Result Value Ref Range    Phosphorus 2.8 2.7 - 4.5 mg/dL   BILIRUBIN, DIRECT    Collection Time: 07/17/24  9:57 AM   Result Value Ref Range    Bilirubin, Direct 0.3 0.1 - 0.3 mg/dL   TSH    Collection Time: 07/17/24  9:57 AM   Result Value Ref Range    TSH 1.444 0.400 - 4.000 uIU/mL   CORTISOL, RANDOM    Collection Time: 07/17/24  9:57 AM   Result Value Ref Range    Cortisol 7.90 ug/dL     Imaging:    3/26/24 - Esophagram  Impression:  Postoperative change of esophagectomy with gastric pull-through.   Apparent area of luminal narrowing in the expected location of the pylorus allowing mild intermittent passage of contrast, concerning for pyloric stricture.  Endoscopy may provide further characterization if clinically warranted.     This report was flagged in Epic as abnormal.      4/25/24 - CT CAP:  Impression:     Status post esophagectomy with gastric pull-through.  No evidence of local recurrence.     Suspected new 3 mm nodule in the left lung apex, nonspecific. Clinical and oncologic considerations will determine the role and schedule for continued surveillance.     Additional stable findings in the body of the report.       Path:   3/14/23:  Final Pathologic Diagnosis 1. LYMPH NODE, LEVEL 7, EXCISION:   One benign lymph node (0/1)   2. TOTAL THORACIC ESOPHAGECTOMY AND PROXIMAL STOMACH:   Adenocarcinoma, moderately differentiated, 3.0 cm   Margins are negative   Tumor invades adventitia   Extensive residual cancer with no evident tumor regression (poor or no   response, score 3)   Eleven benign lymph nodes (0/11)   3. RESIDUAL CONDUIT, EXCISION:   Negative for malignancy   SYNOPTIC REPORT   Procedure - Esophageal gastrectomy   Tumor site - GE Junction   Relationship of tumor to esophagogastric junction - Lesion is central at GE   junction   Tumor size - 3.0 x 3.1cm   Histologic type - Adenocarcinoma   Histologic grade - Moderately differentiated   Microscopic tumor extension - Tumor invades adventitia   Margins - All margins of resection are uninvolved, proximal, distal and   adventitial margins are negative   Treatment effect - Extensive residual cancer with no evident tumor regression   (poor or no response, score 3)   Lymphovascular invasion - Not identified   Pathologic staging - yp T3 N0 Mx   Lymph nodes examined - 12   Lymph nodes involved - 0   Additional pathologic findings - Intestinal metaplasia present   MMR-IHC has been performed on previous biopsy ([id number]) and shows all 4   antibodies intact       Assessment:       1. Esophageal adenocarcinoma    2. Esophageal dysphagia    3. Vocal cord paralysis    4. Hypertension associated with diabetes    5. Hyperlipidemia associated with type 2 diabetes mellitus    6. Type 2 diabetes mellitus with stage 3 chronic kidney disease, without long-term current use of insulin, unspecified whether stage 3a or 3b CKD    7. Coronary artery disease involving native coronary artery of native heart without angina pectoris    8. Paroxysmal atrial fibrillation    9. HFrEF (heart failure with reduced ejection fraction)    10. Thrombocytopenia            Plan:     # Esophageal adenocarcinoma   Mr. Person is a pleasant 67 year old male who presents to our clinic for management of esophageal cancer. He initially presented with dysphagia, and further workup confirmed a stage II adenocarcinoma, pMMR. Tumor staged T3N0Mx by endosonographic criteria, JEVON. CT CAP on 12/14/22 confirmed no evidence of metastatic disease.    Previously conversation regarding his diagnosis and treatment options.  Recommended perioperative chemo/radiation per the CROSS trial. Discussed chemoradiation for ~5 weeks with 5 doses of weekly carboplatin and taxol administered. Plan to obtain restaging scans 4-5 weeks after radiation completed.     He was a candidate for a cooperative group trial assessing perioperative immunotherapy treatment. He consented for this study - ECOG-ACRIN [id number]: A Phase II/III Study of Dilcia-operative Nivolumab and Ipilimumab in Patients with Locoregional Esophageal and Gastroesophageal Junction Adenocarcinoma    Previously met with Dr. Santana Brown who agreed he was a surgical candidate.  Previously met with Dr. Javier Moulton who will be treating him with radiation.    Began cycle 1 carboplatin/paclitaxel/nivolumab on trial on 12/29/22.  Received cycle 4 of weekly chemotherapy on trial on 1/20/23.   We held chemotherapy for week 5 because of thrombocytopenia per protocol.     Completed radiation on 2/1/23.    Restaging PET/CT personally reviewed on 3/1/23 shows interval decreased FDG avidity in esophageal tumor, no new sites of disease.  CT CAP 3/2/23 shows stable esophageal thickening.    Underwent robotic esophagectomy on 3/14/23 with Dr. Brown.  Pathology showed negative margins, ypT3 N0 tumor with 0 of 12 lymph nodes involved.  No treatment effect was noted on the tumor tissue.    Discussed that per the [id number] protocol will proceed with randomization to adjuvant nivolumab +/- ipilimumab. Patient randomized to receive adjuvant Nivolumab, started cycle 1 at 480 mg q4 weeks 5/1/23. Plan for twelve months per protocol.    Tolerating very well. Grade 1 pruritis.    Repeat imaging after cycle 6 shows DAVE.  Repeat imaging after cycle 13 (final cycle) shows DAVE.    Final cycle administered 4/1/24.    Underwent dilation with Dr. Brown on 5/31/23 with temporary improvement in dysphagia. Weight stable.  Still having some dysphagia so esophogram ordered which showed concern for pyloric stricture.   Underwent Botox injection into pylorus with Dr. Brown on 5/3/24.     PD-L1 CPS 30.    RTC in 3 months per protocol with imaging.    # Vocal cord paralysis  Post-op. S/p injection by ENT.  Stable. Last evaluated 9/11/23 by ENT with improvement.    Now essentially resolved.    # HTN, HLD, DM, CKD  Following with PCP Dr. Wilson and nephrologist Dr. Oconnell.   BP managed by Dr. Nick.  BP mildly elevated today. Asymptomatic. Taking losartan and HCTZ.  Cr stable.    # CAD, A fib, CHF  Following with cardiologist Dr. Nick & EP Dr. Phillip.   Was on Xarelto. Now on warfarin. Being monitored by coumadin clinic. Labs monitored on regular basis.   Continue medical management.     # Cytopenias  Chronic TCP and anemia.  No change.  Monitor.    Follow up: per research.    Patient is in agreement with the proposed treatment plan. All questions were answered to the patient's satisfaction. Pt knows to call  clinic if anything is needed before the next clinic visit.    Miki Medrano MD  Hematology/Oncology  [facility]      Route Chart for Scheduling    Med Onc Chart Routing      Follow up with physician . Per research   Follow up with [facility]    Infusion scheduling note    Injection scheduling note    Labs    Imaging    Pharmacy appointment    Other referrals              Treatment Plan Information   [facility] [id number] ARM C ADJUVANT NIVOLUMAB STEP 2   Miki Medrano MD   Upcoming Treatment Dates - [facility] [id number] ARM C ADJUVANT NIVOLUMAB STEP 2    4/29/2024       Chemotherapy       INV nivolumab 480 mg in INV sodium chloride 0.9 % 100 mL chemo infusion    Supportive Plan Information  IV FLUIDS AND ELECTROLYTES   Miki Medrano MD   Upcoming Treatment Dates - IV FLUIDS AND ELECTROLYTES    No upcoming days in selected categories.    Therapy Plan Information  Flushes  heparin, porcine (PF) 100 unit/mL injection flush 500 Units  500 Units, Intravenous, Every visit  sodium chloride 0.9% flush 10 mL  10 mL, Intravenous, Every visit

## 2024-07-19 LAB — ACTH PLAS-MCNC: 16 PG/ML (ref 0–46)

## 2024-07-22 ENCOUNTER — OFFICE VISIT (OUTPATIENT)
Dept: INTERNAL MEDICINE | Facility: CLINIC | Age: 69
End: 2024-07-22
Payer: MEDICARE

## 2024-07-22 VITALS
BODY MASS INDEX: 27.47 KG/M2 | RESPIRATION RATE: 18 BRPM | TEMPERATURE: 98 F | OXYGEN SATURATION: 97 % | DIASTOLIC BLOOD PRESSURE: 62 MMHG | HEART RATE: 53 BPM | HEIGHT: 72 IN | WEIGHT: 202.81 LBS | SYSTOLIC BLOOD PRESSURE: 110 MMHG

## 2024-07-22 DIAGNOSIS — E11.9 DM TYPE 2 WITHOUT RETINOPATHY: Chronic | ICD-10-CM

## 2024-07-22 DIAGNOSIS — E11.9 TYPE 2 DIABETES MELLITUS WITHOUT COMPLICATION, WITHOUT LONG-TERM CURRENT USE OF INSULIN: Primary | Chronic | ICD-10-CM

## 2024-07-22 DIAGNOSIS — C15.9 ESOPHAGEAL ADENOCARCINOMA: Chronic | ICD-10-CM

## 2024-07-22 DIAGNOSIS — L98.9 SKIN LESION: ICD-10-CM

## 2024-07-22 DIAGNOSIS — I25.10 CORONARY ARTERY DISEASE INVOLVING NATIVE CORONARY ARTERY OF NATIVE HEART WITHOUT ANGINA PECTORIS: Chronic | ICD-10-CM

## 2024-07-22 DIAGNOSIS — E11.36 DIABETES MELLITUS WITH CATARACT: Chronic | ICD-10-CM

## 2024-07-22 DIAGNOSIS — I50.20 HFREF (HEART FAILURE WITH REDUCED EJECTION FRACTION): Chronic | ICD-10-CM

## 2024-07-22 DIAGNOSIS — I48.0 PAROXYSMAL ATRIAL FIBRILLATION: Chronic | ICD-10-CM

## 2024-07-22 DIAGNOSIS — E11.69 HYPERLIPIDEMIA ASSOCIATED WITH TYPE 2 DIABETES MELLITUS: Chronic | ICD-10-CM

## 2024-07-22 DIAGNOSIS — E78.5 HYPERLIPIDEMIA ASSOCIATED WITH TYPE 2 DIABETES MELLITUS: Chronic | ICD-10-CM

## 2024-07-22 DIAGNOSIS — I10 ESSENTIAL HYPERTENSION: Chronic | ICD-10-CM

## 2024-07-22 DIAGNOSIS — I70.0 AORTIC ATHEROSCLEROSIS: Chronic | ICD-10-CM

## 2024-07-22 PROCEDURE — G2211 COMPLEX E/M VISIT ADD ON: HCPCS | Mod: S$PBB,,, | Performed by: INTERNAL MEDICINE

## 2024-07-22 PROCEDURE — 99214 OFFICE O/P EST MOD 30 MIN: CPT | Mod: S$PBB,,, | Performed by: INTERNAL MEDICINE

## 2024-07-22 PROCEDURE — 99215 OFFICE O/P EST HI 40 MIN: CPT | Mod: PBBFAC,PO | Performed by: INTERNAL MEDICINE

## 2024-07-22 PROCEDURE — 99999 PR PBB SHADOW E&M-EST. PATIENT-LVL V: CPT | Mod: PBBFAC,,, | Performed by: INTERNAL MEDICINE

## 2024-07-22 NOTE — PROGRESS NOTES
Subjective:       Patient ID: Lukasz Person is a 69 y.o. male.    Chief Complaint: Follow-up (6 mos follow up ) and Medication Refill (All prescription need fill.)    HPI    69-year-old male with esophageal adenocarcinoma here for follow-up.    Diabetes-diet and exercise control.  His BG runs pretty good.  It runs from 100-125.  It is rarely below 100 and rarely above 120.  Lab Results   Component Value Date    HGBA1C 6.8 (H) 04/01/2024    HGBA1C 6.2 (H) 01/08/2024    HGBA1C 6.2 (H) 05/29/2023     Lab Results   Component Value Date    LDLCALC 43.8 (L) 04/17/2024    CREATININE 1.0 07/17/2024     HTN -  Patient is currently on Toprol-XL 25 mg, losartan 25 mg. He does check his BP at home, and it runs 110s-120s/60s-70s. Side effects of medications note: none. Denies headaches, blurred vision, chest pain, shortness of breath, nausea.  He thinks about 40% of the time, he gets an indicator that says he has a fluttering pulse rate.      HLD/aortic atherosclerosis/coronary artery disease - Patient is currently on Crestor 20 mg.  His last lipid panel was   Cholesterol   Date Value Ref Range Status   04/17/2024 112 (L) 120 - 199 mg/dL Final     Comment:     The National Cholesterol Education Program (NCEP) has set the  following guidelines (reference ranges) for Cholesterol:  Optimal.....................<200 mg/dL  Borderline High.............200-239 mg/dL  High........................> or = 240 mg/dL       Triglycerides   Date Value Ref Range Status   04/17/2024 71 30 - 150 mg/dL Final     Comment:     The National Cholesterol Education Program (NCEP) has set the  following guidelines (reference values) for triglycerides:  Normal......................<150 mg/dL  Borderline High.............150-199 mg/dL  High........................200-499 mg/dL       HDL   Date Value Ref Range Status   04/17/2024 54 40 - 75 mg/dL Final     Comment:     The National Cholesterol Education Program (NCEP) has set the  following guidelines  (reference values) for HDL Cholesterol:  Low...............<40 mg/dL  Optimal...........>60 mg/dL       LDL Cholesterol   Date Value Ref Range Status   04/17/2024 43.8 (L) 63.0 - 159.0 mg/dL Final     Comment:     The National Cholesterol Education Program (NCEP) has set the  following guidelines (reference values) for LDL Cholesterol:  Optimal.......................<130 mg/dL  Borderline High...............130-159 mg/dL  High..........................160-189 mg/dL  Very High.....................>190 mg/dL     .  Side effects of the medication: none.    Patient has paroxysmal atrial fibrillation and is on Coumadin.    Patient has heart failure with preserved ejection fraction it is a new current diuretics.    After the infusions, he has a bit of an itch on his back and other places.  This happens with immunology study he is doing.  He has 4 different spots where he wonders if he should see Dr. Ellsworth.      Review of Systems      Objective:      Physical Exam  Vitals reviewed.   Constitutional:       Appearance: He is well-developed.   HENT:      Head: Normocephalic and atraumatic.      Mouth/Throat:      Pharynx: No oropharyngeal exudate.   Eyes:      General: No scleral icterus.        Right eye: No discharge.         Left eye: No discharge.      Pupils: Pupils are equal, round, and reactive to light.   Neck:      Thyroid: No thyromegaly.      Trachea: No tracheal deviation.   Cardiovascular:      Rate and Rhythm: Normal rate and regular rhythm.      Heart sounds: Normal heart sounds. No murmur heard.     No friction rub. No gallop.   Pulmonary:      Effort: Pulmonary effort is normal. No respiratory distress.      Breath sounds: Normal breath sounds. No wheezing or rales.   Chest:      Chest wall: No tenderness.   Abdominal:      General: Bowel sounds are normal. There is no distension.      Palpations: Abdomen is soft. There is no mass.      Tenderness: There is no abdominal tenderness. There is no guarding or  rebound.   Musculoskeletal:         General: No tenderness. Normal range of motion.      Cervical back: Normal range of motion and neck supple.   Skin:     General: Skin is warm and dry.      Coloration: Skin is not pale.      Findings: No erythema or rash.   Neurological:      Mental Status: He is alert and oriented to person, place, and time.   Psychiatric:         Behavior: Behavior normal.         Assessment:       1. Type 2 diabetes mellitus without complication, without long-term current use of insulin    2. Diabetes mellitus with cataract    3. DM type 2 without retinopathy    4. Essential hypertension    5. Hyperlipidemia associated with type 2 diabetes mellitus    6. Coronary artery disease involving native coronary artery of native heart without angina pectoris    7. Aortic atherosclerosis    8. Paroxysmal atrial fibrillation    9. HFrEF (heart failure with reduced ejection fraction)    10. Esophageal adenocarcinoma    11. Skin lesion  - Ambulatory referral/consult to Dermatology; Future      Plan:       1/2/3.  Diet and exercise controlled.    4.  Continue losartan 25 mg, Toprol-XL 25 mg  5/6/7.  Continue Crestor 20 mg p.o.   8. Continue Coumadin.    9.  Monitor   10. Followed by Oncology.    11.  Refer to Dermatology.

## 2024-07-23 ENCOUNTER — PATIENT MESSAGE (OUTPATIENT)
Dept: DERMATOLOGY | Facility: CLINIC | Age: 69
End: 2024-07-23
Payer: MEDICARE

## 2024-07-31 ENCOUNTER — CLINICAL SUPPORT (OUTPATIENT)
Dept: SPEECH THERAPY | Facility: HOSPITAL | Age: 69
End: 2024-07-31
Attending: INTERNAL MEDICINE
Payer: MEDICARE

## 2024-07-31 DIAGNOSIS — J38.00 VOCAL CORD PARALYSIS: ICD-10-CM

## 2024-07-31 PROCEDURE — 92524 BEHAVRAL QUALIT ANALYS VOICE: CPT | Mod: GN | Performed by: SPEECH-LANGUAGE PATHOLOGIST

## 2024-07-31 NOTE — PROGRESS NOTES
Referring provider: Dr. Miki Medrano  Reason for visit:  Behavioral and qualitative analysis of voice and resonance (CPT 09052)    Subjective / History    Lukasz Person is a 69 y.o. male referred for voice evaluation (CPT 07976) by Dr. Medrano.  He presents with complaints of hoarseness, low volume, gravely voice which began over a year ago following VFP associated with esophagectomy.  He had an injection augmentation while still in the hospital and f/u with Dr. Valdivia in May 2023.  At that time, Dr. Valdivia documented that it his vocal fold was starting to move a bit.  The patient also reported the following complaints:  fatigue with use, changes in modal pitch, difficulty with singing, and reduced volume.  He feels his voice gets weaker as he talks.  He has to repeat himself more frequently.  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      Swallowing: no c/o   Breathing:  no c/o     Smoking: quit 25+ years ago    Stroboscopy findings (per Dr. Valdivia in September 2023)  - left vocal fold with slight bowing, with evidence of residual injectate  - right vocal fold with slight bowing  - pliability intact, with slight blunting of mucosal wave amplitudes  - slight posterior over-closure, very mild inconsistent insufficiency      Past Medical History:   Diagnosis Date    Anticoagulant long-term use     Cancer     Diabetes mellitus     Onset late 50s/early 60s    Hyperlipidemia     Hypertension     Onset late 50s/early 60s    Kidney stones     Sleep apnea     since 2006     Current Outpatient Medications on File Prior to Visit   Medication Sig Dispense Refill    allopurinoL (ZYLOPRIM) 100 MG tablet TAKE 1 TABLET BY MOUTH EVERY DAY 90 tablet 0    blood sugar diagnostic (ACCU-CHEK ANTONELLA PLUS TEST STRP) Strp Use to test CBG four times daily E11.65 400 strip 3    blood-glucose meter kit Checks blood sugars 1x/daily. 1 each 12    clotrimazole (LOTRIMIN) 1 % cream Apply topically once daily. for 14 days 45 g 0     hydroCHLOROthiazide (HYDRODIURIL) 25 MG tablet TAKE 1 TABLET(25 MG) BY MOUTH EVERY DAY 30 tablet 11    lancets (ACCU-CHEK SOFTCLIX LANCETS) Misc USE 1 EVERY DAY OR AS DIRECTED 100 each 3    losartan (COZAAR) 25 MG tablet Take 1 tablet (25 mg total) by mouth 2 (two) times a day. 180 tablet 3    metoprolol succinate (TOPROL-XL) 25 MG 24 hr tablet Take 0.5 tablets (12.5 mg total) by mouth once daily. 15 tablet 11    nitroGLYCERIN (NITROSTAT) 0.4 MG SL tablet Place 1 tablet (0.4 mg total) under the tongue every 5 (five) minutes as needed for Chest pain. If you need a third tablet, call 911 100 tablet 3    omeprazole (PRILOSEC) 40 MG capsule TAKE 1 CAPSULE(40 MG) BY MOUTH EVERY DAY 90 capsule 3    rosuvastatin (CRESTOR) 20 MG tablet Take 1 tablet (20 mg total) by mouth every evening. 90 tablet 3    tamsulosin (FLOMAX) 0.4 mg Cap TAKE 1 CAPSULE(0.4 MG) BY MOUTH EVERY DAY 30 capsule 11    testosterone (ANDROGEL) 20.25 mg/1.25 gram (1.62 %) GlPm Apply 4 pumps to shoulders daily 2 each 5    triamcinolone acetonide 0.1% (KENALOG) 0.1 % cream APPLY TOPICALLY TO THE AFFECTED AREA TWICE DAILY 45 g 2    warfarin (COUMADIN) 4 MG tablet 6mg PO Sun, Tues, Thurs and 4mg PO all other days -- OR AS DIRECTED BY COUMADIN CLINIC       No current facility-administered medications on file prior to visit.       Objective    Perceptual/behavioral assessment  -CAPE-V Overall Score: 23  -Quality: rough  -Volume: appropriate for age and gender  -Pitch: appropriate for age and gender identity  -Flexibility: diminished  -Habitual respiratory pattern: chest/clavicular    Education / Stimulability Trials  Discussed importance of vocal hygiene including: hydration and reducing caffeine/drying agents.  Patient was stimulable for improved voice using SOVT/exuberant exercises.  He was able to access a more baseline voice using straw phonation and cues to increase gusto in his voice.  He also found that an occasional throat clear reduced the roughness in  his voice.   Encouraged practicing exercises several times daily in isolation and into short phrases to solidify muscle memory patterns and reduce extralaryngeal tension during speech tasks.  He was amenable to all suggestions.     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 Valdivia.  Prognosis for continued improvement is good.     Recommendations / POC    -Recommend 2-4 sessions of voice therapy over 4-12 weeks with a speech-language pathologist to optimize glottal postures for improved vocal function, vocal efficiency, and ease of phonation  -Continue exercises as discussed in session  -Contact clinician with any further questions     Functional goals  Length Status Goal   Long term Initiated  Patient and clinician will facilitate changes in vocal function in order to restore functional use of voice for daily occupational, social, and emotional demands.    Long term Initiated  Patient will re-establish phonation with adequate balance of airflow and resonance with decreased muscle tension.    Long term Initiated   Patient will improve coordination of respiration and phonation for efficient vocal production at a conversational level.    Short term Initiated  Patient will complete SOVT exercises and/or resonant-focused exercises 3-5x daily to strengthen and balance the intrinsic laryngeal musculature and maximize glottic closure without medial hyperfunction.   Short term Initiated  Patient will discriminate between easy and strained phonation with 80% accuracy.    Short term Initiated   Patient will identify the sensations associated with muscle relaxation in the abdominal, thoracic, neck and facial areas during efficient phonation with minimal clinician cue.    Short term Initiated  Patient will demonstrate the ability to increase awareness of voicing behavior through self-monitoring to facilitate generalization in functional speaking  situations with 80% accuracy.

## 2024-07-31 NOTE — PLAN OF CARE
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 Valdivia.  Prognosis for continued improvement is good.     Recommendations / POC    -Recommend 2-4 sessions of voice therapy over 4-12 weeks with a speech-language pathologist to optimize glottal postures for improved vocal function, vocal efficiency, and ease of phonation  -Continue exercises as discussed in session  -Contact clinician with any further questions     Functional goals  Length Status Goal   Long term Initiated  Patient and clinician will facilitate changes in vocal function in order to restore functional use of voice for daily occupational, social, and emotional demands.    Long term Initiated  Patient will re-establish phonation with adequate balance of airflow and resonance with decreased muscle tension.    Long term Initiated   Patient will improve coordination of respiration and phonation for efficient vocal production at a conversational level.    Short term Initiated  Patient will complete SOVT exercises and/or resonant-focused exercises 3-5x daily to strengthen and balance the intrinsic laryngeal musculature and maximize glottic closure without medial hyperfunction.   Short term Initiated  Patient will discriminate between easy and strained phonation with 80% accuracy.    Short term Initiated   Patient will identify the sensations associated with muscle relaxation in the abdominal, thoracic, neck and facial areas during efficient phonation with minimal clinician cue.    Short term Initiated  Patient will demonstrate the ability to increase awareness of voicing behavior through self-monitoring to facilitate generalization in functional speaking situations with 80% accuracy.

## 2024-08-05 ENCOUNTER — OFFICE VISIT (OUTPATIENT)
Dept: DERMATOLOGY | Facility: CLINIC | Age: 69
End: 2024-08-05
Payer: MEDICARE

## 2024-08-05 VITALS — WEIGHT: 202 LBS | BODY MASS INDEX: 27.4 KG/M2

## 2024-08-05 DIAGNOSIS — Z12.83 SKIN EXAM, SCREENING FOR CANCER: ICD-10-CM

## 2024-08-05 DIAGNOSIS — L82.1 SEBORRHEIC KERATOSES: ICD-10-CM

## 2024-08-05 DIAGNOSIS — L57.0 MULTIPLE ACTINIC KERATOSES: Primary | ICD-10-CM

## 2024-08-05 DIAGNOSIS — L81.4 LENTIGINES: ICD-10-CM

## 2024-08-05 PROCEDURE — 99213 OFFICE O/P EST LOW 20 MIN: CPT | Mod: PBBFAC,PO | Performed by: DERMATOLOGY

## 2024-08-05 PROCEDURE — 17000 DESTRUCT PREMALG LESION: CPT | Mod: S$PBB,,, | Performed by: DERMATOLOGY

## 2024-08-05 PROCEDURE — 17000 DESTRUCT PREMALG LESION: CPT | Mod: PBBFAC,PO | Performed by: DERMATOLOGY

## 2024-08-05 PROCEDURE — 99999 PR PBB SHADOW E&M-EST. PATIENT-LVL III: CPT | Mod: PBBFAC,,, | Performed by: DERMATOLOGY

## 2024-08-05 PROCEDURE — 17003 DESTRUCT PREMALG LES 2-14: CPT | Mod: PBBFAC,PO | Performed by: DERMATOLOGY

## 2024-08-05 PROCEDURE — 99214 OFFICE O/P EST MOD 30 MIN: CPT | Mod: 25,S$PBB,, | Performed by: DERMATOLOGY

## 2024-08-05 PROCEDURE — 17003 DESTRUCT PREMALG LES 2-14: CPT | Mod: S$PBB,,, | Performed by: DERMATOLOGY

## 2024-08-06 ENCOUNTER — LAB VISIT (OUTPATIENT)
Dept: LAB | Facility: HOSPITAL | Age: 69
End: 2024-08-06
Attending: INTERNAL MEDICINE
Payer: MEDICARE

## 2024-08-06 ENCOUNTER — PATIENT MESSAGE (OUTPATIENT)
Dept: CARDIOLOGY | Facility: CLINIC | Age: 69
End: 2024-08-06

## 2024-08-06 ENCOUNTER — ANTI-COAG VISIT (OUTPATIENT)
Dept: CARDIOLOGY | Facility: CLINIC | Age: 69
End: 2024-08-06
Payer: MEDICARE

## 2024-08-06 DIAGNOSIS — I50.20 HFREF (HEART FAILURE WITH REDUCED EJECTION FRACTION): ICD-10-CM

## 2024-08-06 DIAGNOSIS — I48.0 PAROXYSMAL ATRIAL FIBRILLATION: Primary | Chronic | ICD-10-CM

## 2024-08-06 DIAGNOSIS — I48.0 PAROXYSMAL ATRIAL FIBRILLATION: Chronic | ICD-10-CM

## 2024-08-06 DIAGNOSIS — I25.10 CORONARY ARTERY DISEASE INVOLVING NATIVE CORONARY ARTERY OF NATIVE HEART WITHOUT ANGINA PECTORIS: ICD-10-CM

## 2024-08-06 LAB
ALBUMIN SERPL BCP-MCNC: 3.6 G/DL (ref 3.5–5.2)
ALP SERPL-CCNC: 86 U/L (ref 55–135)
ALT SERPL W/O P-5'-P-CCNC: 23 U/L (ref 10–44)
ANION GAP SERPL CALC-SCNC: 10 MMOL/L (ref 8–16)
AST SERPL-CCNC: 22 U/L (ref 10–40)
BILIRUB SERPL-MCNC: 0.7 MG/DL (ref 0.1–1)
BUN SERPL-MCNC: 13 MG/DL (ref 8–23)
CALCIUM SERPL-MCNC: 9.1 MG/DL (ref 8.7–10.5)
CHLORIDE SERPL-SCNC: 104 MMOL/L (ref 95–110)
CHOLEST SERPL-MCNC: 85 MG/DL (ref 120–199)
CHOLEST/HDLC SERPL: 2.5 {RATIO} (ref 2–5)
CO2 SERPL-SCNC: 29 MMOL/L (ref 23–29)
CREAT SERPL-MCNC: 1.2 MG/DL (ref 0.5–1.4)
ERYTHROCYTE [DISTWIDTH] IN BLOOD BY AUTOMATED COUNT: 13.2 % (ref 11.5–14.5)
EST. GFR  (NO RACE VARIABLE): >60 ML/MIN/1.73 M^2
GLUCOSE SERPL-MCNC: 124 MG/DL (ref 70–110)
HCT VFR BLD AUTO: 41.9 % (ref 40–54)
HDLC SERPL-MCNC: 34 MG/DL (ref 40–75)
HDLC SERPL: 40 % (ref 20–50)
HGB BLD-MCNC: 12.9 G/DL (ref 14–18)
INR PPP: 3 (ref 0.8–1.2)
LDLC SERPL CALC-MCNC: 29.2 MG/DL (ref 63–159)
MCH RBC QN AUTO: 29.3 PG (ref 27–31)
MCHC RBC AUTO-ENTMCNC: 30.8 G/DL (ref 32–36)
MCV RBC AUTO: 95 FL (ref 82–98)
NONHDLC SERPL-MCNC: 51 MG/DL
PLATELET # BLD AUTO: 152 K/UL (ref 150–450)
PMV BLD AUTO: 10.4 FL (ref 9.2–12.9)
POTASSIUM SERPL-SCNC: 4.4 MMOL/L (ref 3.5–5.1)
PROT SERPL-MCNC: 6.9 G/DL (ref 6–8.4)
PROTHROMBIN TIME: 30.3 SEC (ref 9–12.5)
RBC # BLD AUTO: 4.4 M/UL (ref 4.6–6.2)
SODIUM SERPL-SCNC: 143 MMOL/L (ref 136–145)
TRIGL SERPL-MCNC: 109 MG/DL (ref 30–150)
WBC # BLD AUTO: 5.27 K/UL (ref 3.9–12.7)

## 2024-08-06 PROCEDURE — 93793 ANTICOAG MGMT PT WARFARIN: CPT | Mod: ,,, | Performed by: PHARMACIST

## 2024-08-06 PROCEDURE — 85027 COMPLETE CBC AUTOMATED: CPT | Performed by: INTERNAL MEDICINE

## 2024-08-06 PROCEDURE — 85610 PROTHROMBIN TIME: CPT | Performed by: INTERNAL MEDICINE

## 2024-08-06 PROCEDURE — 36415 COLL VENOUS BLD VENIPUNCTURE: CPT | Performed by: INTERNAL MEDICINE

## 2024-08-06 PROCEDURE — 80053 COMPREHEN METABOLIC PANEL: CPT | Performed by: INTERNAL MEDICINE

## 2024-08-06 PROCEDURE — 80061 LIPID PANEL: CPT | Performed by: INTERNAL MEDICINE

## 2024-08-09 ENCOUNTER — OFFICE VISIT (OUTPATIENT)
Dept: INTERNAL MEDICINE | Facility: CLINIC | Age: 69
End: 2024-08-09
Payer: MEDICARE

## 2024-08-09 VITALS
WEIGHT: 207.44 LBS | OXYGEN SATURATION: 98 % | HEIGHT: 72 IN | BODY MASS INDEX: 28.1 KG/M2 | SYSTOLIC BLOOD PRESSURE: 105 MMHG | DIASTOLIC BLOOD PRESSURE: 54 MMHG | HEART RATE: 56 BPM

## 2024-08-09 DIAGNOSIS — K61.1 PERI-RECTAL ABSCESS: Primary | ICD-10-CM

## 2024-08-09 DIAGNOSIS — K61.1 PERIRECTAL ABSCESS: ICD-10-CM

## 2024-08-09 PROCEDURE — 99213 OFFICE O/P EST LOW 20 MIN: CPT | Mod: PBBFAC

## 2024-08-09 PROCEDURE — 99999 PR PBB SHADOW E&M-EST. PATIENT-LVL III: CPT | Mod: PBBFAC,GC,,

## 2024-08-09 RX ORDER — SULFAMETHOXAZOLE AND TRIMETHOPRIM 800; 160 MG/1; MG/1
1 TABLET ORAL 2 TIMES DAILY
Qty: 14 TABLET | Refills: 0 | Status: SHIPPED | OUTPATIENT
Start: 2024-08-09 | End: 2024-08-16

## 2024-08-10 ENCOUNTER — PATIENT MESSAGE (OUTPATIENT)
Dept: OPTOMETRY | Facility: CLINIC | Age: 69
End: 2024-08-10
Payer: MEDICARE

## 2024-08-13 ENCOUNTER — OFFICE VISIT (OUTPATIENT)
Dept: CARDIOLOGY | Facility: CLINIC | Age: 69
End: 2024-08-13
Payer: MEDICARE

## 2024-08-13 ENCOUNTER — CLINICAL SUPPORT (OUTPATIENT)
Dept: SPEECH THERAPY | Facility: HOSPITAL | Age: 69
End: 2024-08-13
Payer: MEDICARE

## 2024-08-13 ENCOUNTER — PATIENT MESSAGE (OUTPATIENT)
Dept: SPEECH THERAPY | Facility: HOSPITAL | Age: 69
End: 2024-08-13

## 2024-08-13 VITALS
DIASTOLIC BLOOD PRESSURE: 59 MMHG | HEIGHT: 72 IN | SYSTOLIC BLOOD PRESSURE: 119 MMHG | WEIGHT: 205.25 LBS | BODY MASS INDEX: 27.8 KG/M2 | HEART RATE: 49 BPM

## 2024-08-13 DIAGNOSIS — E78.5 HYPERLIPIDEMIA ASSOCIATED WITH TYPE 2 DIABETES MELLITUS: Chronic | ICD-10-CM

## 2024-08-13 DIAGNOSIS — I25.10 CORONARY ARTERY DISEASE INVOLVING NATIVE CORONARY ARTERY OF NATIVE HEART WITHOUT ANGINA PECTORIS: Chronic | ICD-10-CM

## 2024-08-13 DIAGNOSIS — Z79.01 LONG TERM (CURRENT) USE OF ANTICOAGULANTS: ICD-10-CM

## 2024-08-13 DIAGNOSIS — I10 ESSENTIAL HYPERTENSION: Chronic | ICD-10-CM

## 2024-08-13 DIAGNOSIS — J38.00 VOCAL CORD PARALYSIS: Primary | ICD-10-CM

## 2024-08-13 DIAGNOSIS — I48.0 PAROXYSMAL ATRIAL FIBRILLATION: Primary | Chronic | ICD-10-CM

## 2024-08-13 DIAGNOSIS — I70.0 AORTIC ATHEROSCLEROSIS: ICD-10-CM

## 2024-08-13 DIAGNOSIS — E11.69 HYPERLIPIDEMIA ASSOCIATED WITH TYPE 2 DIABETES MELLITUS: Chronic | ICD-10-CM

## 2024-08-13 LAB
OHS QRS DURATION: 94 MS
OHS QTC CALCULATION: 413 MS

## 2024-08-13 PROCEDURE — 93005 ELECTROCARDIOGRAM TRACING: CPT | Mod: PBBFAC,PO | Performed by: INTERNAL MEDICINE

## 2024-08-13 PROCEDURE — 93010 ELECTROCARDIOGRAM REPORT: CPT | Mod: S$PBB,,, | Performed by: INTERNAL MEDICINE

## 2024-08-13 PROCEDURE — 99214 OFFICE O/P EST MOD 30 MIN: CPT | Mod: PBBFAC,25,PO | Performed by: PHYSICIAN ASSISTANT

## 2024-08-13 PROCEDURE — 99214 OFFICE O/P EST MOD 30 MIN: CPT | Mod: S$PBB,,, | Performed by: PHYSICIAN ASSISTANT

## 2024-08-13 PROCEDURE — 92507 TX SP LANG VOICE COMM INDIV: CPT | Mod: GN | Performed by: SPEECH-LANGUAGE PATHOLOGIST

## 2024-08-13 PROCEDURE — 99999 PR PBB SHADOW E&M-EST. PATIENT-LVL IV: CPT | Mod: PBBFAC,,, | Performed by: PHYSICIAN ASSISTANT

## 2024-08-13 NOTE — PROGRESS NOTES
Referring provider: Dr. Miki Medrano  Reason for visit:  Voice treatment (CPT 99213)  Session #1    History / Subjective   I had the pleasure of seeing Lukasz Person for his first treatment session following complete voice evaluation on 7/31/24.  During that time, improvements were noted on exuberant voice exercises.    08/13/2024:  Since evaluation, he reports he has better control over his voice.  Has even been singing a bit.  Has been able to maintain his improved voice in spite of some slight rasp.  Overall pleased with progress.     Objective   The primary goal of todays session was to review HEP.  This was targeted using SOVT/resonant treatment modalities.    Perceptual/behavioral assessment  -CAPE-V Overall Score: 14  -Quality:  mild roughness  -Volume: appropriate for age and gender identity  -Pitch: appropriate for age and gender identity  -Flexibility: diminished  -Habitual respiratory pattern: diaphragmatic    Treatment  Reviewed SOVT straw phonation as needed but emphasized and encouraged what patient has already been doing, of identifying when voice quality trails off and addressing it quickly in conversation.  For home practice, clinician reviewed home exercises as practiced during the session with the patient.  Discussed alginate therapy as a possibility to address the thick mucus pt has been dealing with since surgery.      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 Valdivia.  He has made progress toward goals.      Recommendations / POC    Due to patient and clinician perceived progress, recommend d/c from voice therapy.  He should continue the exercises as discussed and contact me if his condition worsens again.  He is in agreement.     Functional goals  Length Status Goal   Long term Met  Patient and clinician will facilitate changes in vocal function in order to restore functional use of voice for daily  occupational, social, and emotional demands.    Long term Ongoing Patient will re-establish phonation with adequate balance of airflow and resonance with decreased muscle tension.    Long term Met Patient will improve coordination of respiration and phonation for efficient vocal production at a conversational level.    Short term Met Patient will complete SOVT exercises and/or resonant-focused exercises 3-5x daily to strengthen and balance the intrinsic laryngeal musculature and maximize glottic closure without medial hyperfunction.   Short term Met Patient will discriminate between easy and strained phonation with 80% accuracy.    Short term Ongoing Patient will identify the sensations associated with muscle relaxation in the abdominal, thoracic, neck and facial areas during efficient phonation with minimal clinician cue.    Short term Met Patient will demonstrate the ability to increase awareness of voicing behavior through self-monitoring to facilitate generalization in functional speaking situations with 80% accuracy.

## 2024-08-13 NOTE — PROGRESS NOTES
Cardiology Clinic Note  Reason for Visit: F/u paroxysmal afib  General Cardiologist: Dr. Vu     HPI:     Problem List and HPI:   Non-obstructive CAD  - 50% PDA per [facility] (9/2020)  Mildly reduced LV systolic function  Diabetes mellitus since his late 50s/early 60s  Hypertension since his late 50s/early 60s  Mixed hyperlipidemia  Paroxysmal atrial fibrillation in setting of pyelonephritis 4/2022  KUMAR - 2006  Lithotripsy  Parathyroidectomy 2017  7 pack year h/o smoking, quit in 1972  Esophageal cancer 3/2023      Lukasz Person is a 69 y.o. M, who presents for routine follow up. He is doing very well and has no complaints today. He is mostly recovered from esophageal cancer diagnosed last year. He is s/p esophagectomy, and initially had significant weight loss, which has now stabilized. He feels better since weight loss and has been able to get off diabetic medications. An echocardiogram in Jan 2024 does not show any significant abnormalities. He brought a record of his BP and blood sugar readings from home. BP is well controlled, occasionally low. He is bradycardic, and denies any dizziness, light headedness or presyncope.     ROS:    Pertinent ROS included in HPI and below.  PMH:     Past Medical History:   Diagnosis Date    Anticoagulant long-term use     Cancer     Diabetes mellitus     Onset late 50s/early 60s    Hyperlipidemia     Hypertension     Onset late 50s/early 60s    Kidney stones     Sleep apnea     since 2006     Past Surgical History:   Procedure Laterality Date    COLONOSCOPY N/A 10/26/2023    Procedure: COLONOSCOPY;  Surgeon: Noah Duong MD;  Location: [facility] ([address] [place] [address]);  Service: Endoscopy;  Laterality: N/A;  instructions sent to patient portal. Tbou  referral: Dr. Wilson  Pt. on Xarelto--tb-ok to hold see te 8/15/23 dr vu  10/13-precall complete-MS  10/19 pt rescheduled, Xarelto hold, PEG, portal -ml  10/24-precall complete-KPvt    CORONARY ANGIOGRAPHY N/A 9/30/2020    Procedure:  ANGIOGRAM, CORONARY ARTERY;  Surgeon: John West MD;  Location: [facility] CATH LAB;  Service: Cardiology;  Laterality: N/A;    CYSTOSCOPY N/A 2/17/2022    Procedure: CYSTOSCOPY;  Surgeon: Lukasz Hughes MD;  Location: [facility] OR 1ST FLR;  Service: Urology;  Laterality: N/A;    CYSTOSCOPY W/ URETERAL STENT PLACEMENT N/A 2/25/2022    Procedure: CYSTOSCOPY, WITH URETERAL STENT INSERTION;  Surgeon: Lukasz Hughes MD;  Location: [facility] OR 1ST FLR;  Service: Urology;  Laterality: N/A;    ENDOSCOPIC ULTRASOUND OF UPPER GASTROINTESTINAL TRACT N/A 12/7/2022    Procedure: ULTRASOUND, UPPER GI TRACT, ENDOSCOPIC;  Surgeon: Darian Main MD;  Location: [facility] ENDO;  Service: Endoscopy;  Laterality: N/A;  Approval to hold Xarelto rec'd from Dr. Nick (see t/e 12/5/22)-DS    ESOPHAGOGASTRODUODENOSCOPY N/A 11/17/2022    Procedure: EGD (ESOPHAGOGASTRODUODENOSCOPY);  Surgeon: Brody Gonzales MD;  Location: [facility] (2ND FLR);  Service: Endoscopy;  Laterality: N/A;  inst via email-ok to hold Xarelto x 2 days-MS    ESOPHAGOGASTRODUODENOSCOPY N/A 5/31/2023    Procedure: EGD (ESOPHAGOGASTRODUODENOSCOPY) WITH DILATION;  Surgeon: Santana Brown Jr., MD;  Location: [facility] OR 2ND FLR;  Service: General;  Laterality: N/A;    ESOPHAGOGASTRODUODENOSCOPY N/A 5/3/2024    Procedure: EGD (ESOPHAGOGASTRODUODENOSCOPY) with botox injection;  Surgeon: Santana Brown Jr., MD;  Location: [facility] OR 2ND FLR;  Service: General;  Laterality: N/A;    INJECTION OF BOTULINUM TOXIN TYPE A  5/3/2024    Procedure: INJECTION, BOTULINUM TOXIN, TYPE A;  Surgeon: Santana Brown Jr., MD;  Location: [facility] OR 2ND FLR;  Service: General;;    LASER LITHOTRIPSY Left 2/25/2022    Procedure: LITHOTRIPSY, USING LASER;  Surgeon: Lukasz Hughes MD;  Location: [facility] OR 1ST FLR;  Service: Urology;  Laterality: Left;    LEFT HEART CATHETERIZATION Left 9/30/2020    Procedure: Left heart cath;  Surgeon: John West MD;  Location: [facility] CATH LAB;  Service:  Cardiology;  Laterality: Left;    LITHOTRIPSY      PARATHYROIDECTOMY  1/1/2-107    PYELOSCOPY Left 2/25/2022    Procedure: PYELOSCOPY;  Surgeon: Lukasz Hughes MD;  Location: [address];  Service: Urology;  Laterality: Left;    ROBOT-ASSISTED SURGICAL REMOVAL OF ESOPHAGUS USING DA CARLOS XI N/A 3/14/2023    Procedure: XI ROBOTIC ESOPHAGECTOMY;  Surgeon: Santana Brown Jr., MD;  Location: [facility] OR [address] [place] [address];  Service: General;  Laterality: N/A;  Abdomen, Chest and Neck    ROBOTIC JEJUNOSTOMY N/A 3/14/2023    Procedure: ROBOTIC JEJUNOSTOMY TUBE INSERTION;  Surgeon: Santana Brown Jr., MD;  Location: [address];  Service: General;  Laterality: N/A;    TRANSESOPHAGEAL ECHOCARDIOGRAPHY N/A 4/7/2022    Procedure: ECHOCARDIOGRAM, TRANSESOPHAGEAL;  Surgeon: Xander Diagnostic Provider;  Location: [facility] EP LAB;  Service: Cardiology;  Laterality: N/A;    TREATMENT OF CARDIAC ARRHYTHMIA N/A 4/7/2022    Procedure: Cardioversion or Defibrillation;  Surgeon: Iris Fisher NP;  Location: [facility] EP LAB;  Service: Cardiology;  Laterality: N/A;  afib, dccv, artie, anes, EH, 3prep    URETEROSCOPIC REMOVAL OF URETERIC CALCULUS Left 2/25/2022    Procedure: REMOVAL, CALCULUS, URETER, URETEROSCOPIC;  Surgeon: Lukasz Hughes MD;  Location: [address];  Service: Urology;  Laterality: Left;    URETEROSCOPY Left 2/25/2022    Procedure: URETEROSCOPY;  Surgeon: Lukasz Hughes MD;  Location: [address];  Service: Urology;  Laterality: Left;    VOCAL CORD INJECTION Left 3/17/2023    Procedure: INJECTION, VOCAL CORD, LARYNGOSCOPIC;  Surgeon: Gm Altman MD;  Location: [address];  Service: ENT;  Laterality: Left;     Allergies:   Review of patient's allergies indicates:  No Known Allergies  Medications:     Current Outpatient Medications on File Prior to Visit   Medication Sig Dispense Refill    allopurinoL (ZYLOPRIM) 100 MG tablet TAKE 1 TABLET BY MOUTH EVERY DAY 90 tablet 0    blood sugar diagnostic  (ACCU-CHEK ANTONELLA PLUS TEST STRP) Strp Use to test CBG four times daily E11.65 400 strip 3    blood-glucose meter kit Checks blood sugars 1x/daily. 1 each 12    clotrimazole (LOTRIMIN) 1 % cream Apply topically once daily. for 14 days (Patient taking differently: Apply topically as needed.) 45 g 0    hydroCHLOROthiazide (HYDRODIURIL) 25 MG tablet TAKE 1 TABLET(25 MG) BY MOUTH EVERY DAY 30 tablet 11    lancets (ACCU-CHEK SOFTCLIX LANCETS) Misc USE 1 EVERY DAY OR AS DIRECTED 100 each 3    losartan (COZAAR) 25 MG tablet Take 1 tablet (25 mg total) by mouth 2 (two) times a day. 180 tablet 3    metoprolol succinate (TOPROL-XL) 25 MG 24 hr tablet Take 0.5 tablets (12.5 mg total) by mouth once daily. 15 tablet 11    nitroGLYCERIN (NITROSTAT) 0.4 MG SL tablet Place 1 tablet (0.4 mg total) under the tongue every 5 (five) minutes as needed for Chest pain. If you need a third tablet, call 911 100 tablet 3    omeprazole (PRILOSEC) 40 MG capsule TAKE 1 CAPSULE(40 MG) BY MOUTH EVERY DAY 90 capsule 3    rosuvastatin (CRESTOR) 20 MG tablet Take 1 tablet (20 mg total) by mouth every evening. 90 tablet 3    sulfamethoxazole-trimethoprim 800-160mg (BACTRIM DS) 800-160 mg Tab Take 1 tablet by mouth 2 (two) times daily. for 7 days 14 tablet 0    tamsulosin (FLOMAX) 0.4 mg Cap TAKE 1 CAPSULE(0.4 MG) BY MOUTH EVERY DAY 30 capsule 11    testosterone (ANDROGEL) 20.25 mg/1.25 gram (1.62 %) GlPm Apply 4 pumps to shoulders daily 2 each 5    triamcinolone acetonide 0.1% (KENALOG) 0.1 % cream APPLY TOPICALLY TO THE AFFECTED AREA TWICE DAILY (Patient taking differently: 2 (two) times daily as needed. Apply to affected area) 45 g 2    warfarin (COUMADIN) 4 MG tablet 6mg PO Sun, Tues, Thurs and 4mg PO all other days -- OR AS DIRECTED BY COUMADIN CLINIC       No current facility-administered medications on file prior to visit.     Social History:     Social History     Tobacco Use    Smoking status: Former     Current packs/day: 0.00     Average  packs/day: 1 pack/day for 22.7 years (22.7 ttl pk-yrs)     Types: Cigarettes, Cigars     Start date: 1972     Quit date:      Years since quittin.2     Passive exposure: Never    Smokeless tobacco: Never    Tobacco comments:     smoking was off and on.  cumulative 7 years.  never more than three years in one stretch or more lj   Substance Use Topics    Alcohol use: Yes     Alcohol/week: 4.0 standard drinks of alcohol     Types: 4 Shots of liquor per week     Family History:     Family History   Problem Relation Name Age of Onset    Arthritis Mother Junay     Heart disease Father Diomedes 70        CABG    Arthritis Father Diomedes     Diabetes Father Diomedes     Kidney disease Father Diomedes         had one kidney removed in early thirties    Arthritis Sister Dee     Arthritis Sister Josette     Hypertension Sister Delores     Colon cancer Brother Sterling 32    Arthritis Brother Diomedes     Alcohol abuse Paternal Aunt Betina     Cancer Maternal Grandfather Yaron         throat cancer    Diabetes Paternal Grandmother Cassandra     Colon polyps Paternal Grandfather Diomedes     Colon cancer Paternal Grandfather Diomedes     Cancer Paternal Grandfather Diomedes         colon cancer at age 62     Physical Exam:   BP (!) 119/59   Pulse (!) 49   Ht 6' (1.829 m)   Wt 93.1 kg (205 lb 4 oz)   BMI 27.84 kg/m²      Physical Exam  Vitals and nursing note reviewed.   Constitutional:       Appearance: Normal appearance.   HENT:      Head: Normocephalic and atraumatic.   Neck:      Vascular: Normal carotid pulses. No carotid bruit or hepatojugular reflux.   Cardiovascular:      Rate and Rhythm: Normal rate and regular rhythm.      Chest Wall: PMI is not displaced.      Pulses:           Carotid pulses are 2+ on the right side and 2+ on the left side.       Radial pulses are 2+ on the right side and 2+ on the left side.        Dorsalis pedis pulses are 2+ on the right side and 2+ on the left side.         Posterior tibial pulses are 2+ on the right side and 2+ on the left side.   Pulmonary:      Effort: Pulmonary effort is normal.      Breath sounds: Normal breath sounds. No wheezing, rhonchi or rales.   Abdominal:      General: Bowel sounds are normal. There is no abdominal bruit.      Palpations: Abdomen is soft. There is no pulsatile mass.      Tenderness: There is no abdominal tenderness.   Feet:      Right foot:      Skin integrity: Skin integrity normal.      Left foot:      Skin integrity: Skin integrity normal.   Skin:     Capillary Refill: Capillary refill takes less than 2 seconds.   Neurological:      General: No focal deficit present.      Mental Status: He is alert.   Psychiatric:         Mood and Affect: Mood and affect normal.         Speech: Speech normal.         Behavior: Behavior is cooperative.         Thought Content: Thought content normal.          Labs:     Blood Tests:  Lab Results   Component Value Date     08/06/2024    K 4.4 08/06/2024     08/06/2024    CO2 29 08/06/2024    BUN 13 08/06/2024    CREATININE 1.2 08/06/2024     (H) 08/06/2024    HGBA1C 6.8 (H) 04/01/2024    MG 2.1 07/17/2024    AST 22 08/06/2024    ALT 23 08/06/2024    ALBUMIN 3.6 08/06/2024    PROT 6.9 08/06/2024    BILITOT 0.7 08/06/2024    WBC 5.27 08/06/2024    HGB 12.9 (L) 08/06/2024    HCT 41.9 08/06/2024    HCT 32 (L) 03/14/2023    MCV 95 08/06/2024     08/06/2024    INR 3.0 (H) 08/06/2024    TSH 1.444 07/17/2024       Lab Results   Component Value Date    CHOL 85 (L) 08/06/2024    HDL 34 (L) 08/06/2024    TRIG 109 08/06/2024       Lab Results   Component Value Date    LDLCALC 29.2 (L) 08/06/2024         Imaging:     Echocardiogram  TTE 1/3/2024    Left Ventricle: The left ventricle is normal in size. Normal wall thickness. There is concentric remodeling. Normal wall motion. There is normal systolic function with a visually estimated ejection fraction of 55 - 60%. There is normal diastolic  function.    Right Ventricle: Normal right ventricular cavity size. Wall thickness is normal. Right ventricle wall motion  is normal. Systolic function is normal.    Biatrial enlargement    Aortic Valve: There is moderate aortic valve sclerosis. Moderately restricted motion. There is mild stenosis. Aortic valve area by VTI is 1.63 cm². Aortic valve peak velocity is 2.36 m/s. Mean gradient is 12 mmHg. The dimensionless index is 0.40.    Pulmonary Artery: The estimated pulmonary artery systolic pressure is 23 mmHg.    IVC/SVC: Intermediate venous pressure at 8 mmHg.    Stress testing  None    Cath Lab  [facility] 2020  LVEDP (Pre): 10  50% PDA stenosis iFR=0.98  Estimated blood loss: <50 mL    Other  None    [id number]  Unusual P axis, possible ectopic atrial bradycardia   Nonspecific ST and T wave abnormality   Abnormal ECG   When compared with ECG of 2023 10:16,   Ectopic atrial rhythm has replaced Sinus rhythm     Assessment:     1. Paroxysmal atrial fibrillation    2. Long term (current) use of anticoagulants    3. Coronary artery disease involving native coronary artery of native heart without angina pectoris    4. Aortic atherosclerosis    5. Essential hypertension    6. Hyperlipidemia associated with type 2 diabetes mellitus        Plan:     Paroxysmal atrial fibrillation  Long term (current) use of anticoagulants  Continue anticoagulant   Avoid NSAIDs  Reviewed importance of monitoring for bleeding   Reviewed injury precautions    Coronary artery disease involving native coronary artery of native heart without angina pectoris  Aortic atherosclerosis  Nonobstructive CAD per [facility] in   Asymptomatic, continue to monitor   Continue coumadin and rosuvastatin 20 mg qD    Essential hypertension  BP well controlled  Continue current medications and monitoring at home     Hyperlipidemia associated with type 2 diabetes mellitus  Continue rosuvastatin 20 mg qD  Last FLP 2024: LDL  29.2      Signed:  Gerri Osei PA-C  Cardiology     8/14/2024 8:17 AM    Follow-up:     Future Appointments   Date Time Provider Department Center   8/27/2024 10:50 AM LAB, APPOINTMENT [facility] INTMED [facility] IM Karl [facility]ona [facility]   8/28/2024 10:00 AM Geni Lopez, REENA [facility] DELON [address]   9/30/2024  9:45 AM NOM OIC-CT1 500 LB LIMIT [facility] IC [facility]   9/30/2024 10:10 AM LAB, APPOINTMENT [facility] INTMED [facility] IM Karl Jimenez PCW   10/2/2024  9:30 AM Miik Medrano MD [facility] HEMCICT Karl [facility]   10/7/2024  8:00 AM Kirk Wilson MD [facility] IM [place]   11/5/2024 10:30 AM Joanna Kimball, OT [facility] OP R[facility] [place]   11/12/2024 10:30 AM Joanna Kimball, OT [facility] OP RHB [place]   11/14/2024  9:00 AM [facility] OIC-XRAY [facility] XRAY IC Imaging [facility]   11/14/2024  9:30 AM [facility] OIC-US1 MASTER [facility] ULTR IC Imaging [facility]tr   11/19/2024  9:15 AM Lukasz Hughes MD [facility] UROLOG [place]   11/19/2024  1:00 PM Joanna Kimball, OT [facility] OP RHB [place]   11/26/2024 10:30 AM Joanna Kimball, OT [facility] OP RHB [place]   1/22/2025 10:20 AM Kirk Wilson MD [facility] IM [place]

## 2024-08-14 ENCOUNTER — OFFICE VISIT (OUTPATIENT)
Dept: SURGERY | Facility: CLINIC | Age: 69
End: 2024-08-14
Payer: MEDICARE

## 2024-08-14 VITALS — WEIGHT: 204.81 LBS | BODY MASS INDEX: 27.74 KG/M2 | HEIGHT: 72 IN

## 2024-08-14 DIAGNOSIS — K61.1 PERI-RECTAL ABSCESS: ICD-10-CM

## 2024-08-14 PROCEDURE — 99213 OFFICE O/P EST LOW 20 MIN: CPT | Mod: PBBFAC,PO | Performed by: STUDENT IN AN ORGANIZED HEALTH CARE EDUCATION/TRAINING PROGRAM

## 2024-08-14 PROCEDURE — 99999 PR PBB SHADOW E&M-EST. PATIENT-LVL III: CPT | Mod: PBBFAC,,, | Performed by: STUDENT IN AN ORGANIZED HEALTH CARE EDUCATION/TRAINING PROGRAM

## 2024-08-14 NOTE — PROGRESS NOTES
Patient ID: Lukasz Person is a 69 y.o. male.    Chief Complaint: No chief complaint on file.      HPI:  HPI  69M with right perirectal drainage in the past. Underwent in office I&D of right perirectal abscess about a year and a half ago shortly before he underwent esophagectomy for cancer. He did well for a few months after that then had recurrent swelling and drainage from the same area. Resolved with abx. Happened again a few weeks ago but healed on its own. No pain or drainage now.   Cscope was normal last year      Review of Systems   Constitutional:  Negative for chills, diaphoresis and fever.   HENT:  Negative for trouble swallowing.    Respiratory:  Negative for cough, shortness of breath, wheezing and stridor.    Cardiovascular:  Negative for chest pain and palpitations.   Gastrointestinal:  Negative for abdominal distention, abdominal pain, blood in stool, diarrhea, nausea and vomiting.   Endocrine: Negative for cold intolerance and heat intolerance.   Genitourinary:  Negative for difficulty urinating.   Musculoskeletal:  Negative for back pain.   Skin:  Negative for rash.   Allergic/Immunologic: Negative for immunocompromised state.   Neurological:  Negative for dizziness, syncope and numbness.   Hematological:  Negative for adenopathy.   Psychiatric/Behavioral:  Negative for agitation.        Current Outpatient Medications   Medication Sig Dispense Refill    allopurinoL (ZYLOPRIM) 100 MG tablet TAKE 1 TABLET BY MOUTH EVERY DAY 90 tablet 0    blood sugar diagnostic (ACCU-CHEK ANTONELLA PLUS TEST STRP) Strp Use to test CBG four times daily E11.65 400 strip 3    blood-glucose meter kit Checks blood sugars 1x/daily. 1 each 12    hydroCHLOROthiazide (HYDRODIURIL) 25 MG tablet TAKE 1 TABLET(25 MG) BY MOUTH EVERY DAY 30 tablet 11    lancets (ACCU-CHEK SOFTCLIX LANCETS) Misc USE 1 EVERY DAY OR AS DIRECTED 100 each 3    losartan (COZAAR) 25 MG tablet Take 1 tablet (25 mg total) by mouth 2 (two) times a day. 180 tablet  3    nitroGLYCERIN (NITROSTAT) 0.4 MG SL tablet Place 1 tablet (0.4 mg total) under the tongue every 5 (five) minutes as needed for Chest pain. If you need a third tablet, call 911 100 tablet 3    omeprazole (PRILOSEC) 40 MG capsule TAKE 1 CAPSULE(40 MG) BY MOUTH EVERY DAY 90 capsule 3    rosuvastatin (CRESTOR) 20 MG tablet Take 1 tablet (20 mg total) by mouth every evening. 90 tablet 3    sulfamethoxazole-trimethoprim 800-160mg (BACTRIM DS) 800-160 mg Tab Take 1 tablet by mouth 2 (two) times daily. for 7 days 14 tablet 0    tamsulosin (FLOMAX) 0.4 mg Cap TAKE 1 CAPSULE(0.4 MG) BY MOUTH EVERY DAY 30 capsule 11    testosterone (ANDROGEL) 20.25 mg/1.25 gram (1.62 %) GlPm Apply 4 pumps to shoulders daily 2 each 5    triamcinolone acetonide 0.1% (KENALOG) 0.1 % cream APPLY TOPICALLY TO THE AFFECTED AREA TWICE DAILY (Patient taking differently: 2 (two) times daily as needed. Apply to affected area) 45 g 2    warfarin (COUMADIN) 4 MG tablet 6mg PO Sun, Tues, Thurs and 4mg PO all other days -- OR AS DIRECTED BY COUMADIN CLINIC      clotrimazole (LOTRIMIN) 1 % cream Apply topically once daily. for 14 days (Patient taking differently: Apply topically as needed.) 45 g 0    metoprolol succinate (TOPROL-XL) 25 MG 24 hr tablet Take 0.5 tablets (12.5 mg total) by mouth once daily. 15 tablet 11     No current facility-administered medications for this visit.       Review of patient's allergies indicates:  No Known Allergies    Past Medical History:   Diagnosis Date    Anticoagulant long-term use     Cancer     Diabetes mellitus     Onset late 50s/early 60s    Hyperlipidemia     Hypertension     Onset late 50s/early 60s    Kidney stones     Sleep apnea     since 2006       Past Surgical History:   Procedure Laterality Date    COLONOSCOPY N/A 10/26/2023    Procedure: COLONOSCOPY;  Surgeon: Noah Duong MD;  Location: [address]);  Service: Endoscopy;  Laterality: N/A;  instructions sent to patient portal.  Tb  referral: Dr. Wilson  Pt. on Xarelto--tb-ok to hold see te 8/15/23 dr vu  10/13-precall complete-MS  10/19 pt rescheduled, Xarelto hold, PEG, portal -ml  10/24-precall complete-KPvt    CORONARY ANGIOGRAPHY N/A 9/30/2020    Procedure: ANGIOGRAM, CORONARY ARTERY;  Surgeon: John West MD;  Location: [facility] CATH LAB;  Service: Cardiology;  Laterality: N/A;    CYSTOSCOPY N/A 2/17/2022    Procedure: CYSTOSCOPY;  Surgeon: Lukasz Hughes MD;  Location: [facility] OR [facility] FLR;  Service: Urology;  Laterality: N/A;    CYSTOSCOPY W/ URETERAL STENT PLACEMENT N/A 2/25/2022    Procedure: CYSTOSCOPY, WITH URETERAL STENT INSERTION;  Surgeon: Lukasz Hughes MD;  [facility];  Service: Urology;  Laterality: N/A;    ENDOSCOPIC ULTRASOUND OF UPPER GASTROINTESTINAL TRACT N/A 12/7/2022    Procedure: ULTRASOUND, UPPER GI TRACT, ENDOSCOPIC;  Surgeon: Darian Main MD;  Location: [facility] ENDO;  Service: Endoscopy;  Laterality: N/A;  Approval to hold Xarelto rec'd from Dr. Vu (see t/e 12/5/22)-DS    ESOPHAGOGASTRODUODENOSCOPY N/A 11/17/2022    Procedure: EGD (ESOPHAGOGASTRODUODENOSCOPY);  Surgeon: Brody Gonzales MD;  Location: [facility] (2ND FLR);  Service: Endoscopy;  Laterality: N/A;  inst via email-ok to hold Xarelto x 2 days-MS    ESOPHAGOGASTRODUODENOSCOPY N/A 5/31/2023    Procedure: EGD (ESOPHAGOGASTRODUODENOSCOPY) WITH DILATION;  Surgeon: Santana Brown Jr., MD;  Location: [facility] OR 2ND FLR;  Service: General;  Laterality: N/A;    ESOPHAGOGASTRODUODENOSCOPY N/A 5/3/2024    Procedure: EGD (ESOPHAGOGASTRODUODENOSCOPY) with botox injection;  Surgeon: Santana Brown Jr., MD;  Location: [facility] OR 2ND FLR;  Service: General;  Laterality: N/A;    INJECTION OF BOTULINUM TOXIN TYPE A  5/3/2024    Procedure: INJECTION, BOTULINUM TOXIN, TYPE A;  Surgeon: Santana Brown Jr., MD;  Location: [facility] OR [address] [place] [address];  Service: General;;    LASER LITHOTRIPSY Left 2/25/2022    Procedure: LITHOTRIPSY, USING LASER;   Surgeon: Lukasz Hughes MD;  Location: [address];  Service: Urology;  Laterality: Left;    LEFT HEART CATHETERIZATION Left 9/30/2020    Procedure: Left heart cath;  Surgeon: John West MD;  Location: [facility] CATH LAB;  Service: Cardiology;  Laterality: Left;    LITHOTRIPSY      PARATHYROIDECTOMY  1/1/2-107    PYELOSCOPY Left 2/25/2022    Procedure: PYELOSCOPY;  Surgeon: Lukasz Hughes MD;  Location: [address];  Service: Urology;  Laterality: Left;    ROBOT-ASSISTED SURGICAL REMOVAL OF ESOPHAGUS USING DA CARLOS XI N/A 3/14/2023    Procedure: XI ROBOTIC ESOPHAGECTOMY;  Surgeon: Santana Brown Jr., MD;  Location: [address];  Service: General;  Laterality: N/A;  Abdomen, Chest and Neck    ROBOTIC JEJUNOSTOMY N/A 3/14/2023    Procedure: ROBOTIC JEJUNOSTOMY TUBE INSERTION;  Surgeon: Santana Brown Jr., MD;  Location: [address];  Service: General;  Laterality: N/A;    TRANSESOPHAGEAL ECHOCARDIOGRAPHY N/A 4/7/2022    Procedure: ECHOCARDIOGRAM, TRANSESOPHAGEAL;  Surgeon: Xander Diagnostic Provider;  Location: [facility] EP LAB;  Service: Cardiology;  Laterality: N/A;    TREATMENT OF CARDIAC ARRHYTHMIA N/A 4/7/2022    Procedure: Cardioversion or Defibrillation;  Surgeon: Iris Fisher NP;  Location: [facility] EP LAB;  Service: Cardiology;  Laterality: N/A;  afib, dccv, artie, anes, EH, 3prep    URETEROSCOPIC REMOVAL OF URETERIC CALCULUS Left 2/25/2022    Procedure: REMOVAL, CALCULUS, URETER, URETEROSCOPIC;  Surgeon: Lukasz Hughes MD;  Location: [address];  Service: Urology;  Laterality: Left;    URETEROSCOPY Left 2/25/2022    Procedure: URETEROSCOPY;  Surgeon: Lukasz Hughes MD;  Location: [address];  Service: Urology;  Laterality: Left;    VOCAL CORD INJECTION Left 3/17/2023    Procedure: INJECTION, VOCAL CORD, LARYNGOSCOPIC;  Surgeon: Gm Altman MD;  Location: [address];  Service: ENT;  Laterality: Left;       Social History     Socioeconomic History     Marital status:      Spouse name: Amanda   Tobacco Use    Smoking status: Former     Current packs/day: 0.00     Average packs/day: 1 pack/day for 22.7 years (22.7 ttl pk-yrs)     Types: Cigarettes, Cigars     Start date: 1972     Quit date:      Years since quittin.2     Passive exposure: Never    Smokeless tobacco: Never    Tobacco comments:     smoking was off and on.  cumulative 7 years.  never more than three years in one stretch or more lj   Substance and Sexual Activity    Alcohol use: Yes     Alcohol/week: 4.0 standard drinks of alcohol     Types: 4 Shots of liquor per week    Drug use: Not Currently     Types: Marijuana    Sexual activity: Not Currently     Partners: Female     Birth control/protection: None     Comment:      Social Determinants of Health     Financial Resource Strain: Low Risk  (2024)    Overall Financial Resource Strain (CARDIA)     Difficulty of Paying Living Expenses: Not very hard   Food Insecurity: No Food Insecurity (2024)    Hunger Vital Sign     Worried About Running Out of Food in the Last Year: Never true     Ran Out of Food in the Last Year: Never true   Recent Concern: Food Insecurity - Food Insecurity Present (2023)    Hunger Vital Sign     Worried About Running Out of Food in the Last Year: Sometimes true     Ran Out of Food in the Last Year: Never true   Transportation Needs: No Transportation Needs (2024)    PRAPARE - Transportation     Lack of Transportation (Medical): No     Lack of Transportation (Non-Medical): No   Physical Activity: Insufficiently Active (2024)    Exercise Vital Sign     Days of Exercise per Week: 4 days     Minutes of Exercise per Session: 30 min   Stress: No Stress Concern Present (2024)    [employer] - Occupational Stress Questionnaire     Feeling of Stress : Only a little   Housing Stability: Low Risk  (2024)    Housing Stability Vital Sign     Unable to  Pay for Housing in the Last Year: No     Number of Places Lived in the Last Year: 1     Unstable Housing in the Last Year: No       There were no vitals filed for this visit.    Physical Exam  Constitutional:       General: He is not in acute distress.  HENT:      Head: Normocephalic and atraumatic.   Eyes:      General: No scleral icterus.  Cardiovascular:      Rate and Rhythm: Normal rate.   Pulmonary:      Effort: Pulmonary effort is normal. No respiratory distress.      Breath sounds: No stridor.   Abdominal:      Palpations: Abdomen is soft.      Tenderness: There is no abdominal tenderness.   Genitourinary:      Lymphadenopathy:      Cervical: No cervical adenopathy.   Skin:     General: Skin is warm.      Findings: No erythema.   Neurological:      Mental Status: He is alert and oriented to person, place, and time.   Psychiatric:         Behavior: Behavior normal.     Body mass index is 27.78 kg/m².  CT reviewed from earlier this year. 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 Nothing on exam to warrant I&D or excision

## 2024-08-20 DIAGNOSIS — I48.0 PAROXYSMAL ATRIAL FIBRILLATION: ICD-10-CM

## 2024-08-20 NOTE — TELEPHONE ENCOUNTER
Care Due:                  Date            Visit Type   Department     Provider  --------------------------------------------------------------------------------                                EP -                              PRIMARY      [facility] INTERNAL  Last Visit: 07-      CARE ([facility])   CAREY Wilson                              EP -                              PRIMARY      [facility] INTERNAL  Next Visit: 10-      CARE ([facility])   MEDICINE       Kirk Wilson                                                            Last  Test          Frequency    Reason                     Performed    Due Date  --------------------------------------------------------------------------------    Uric Acid...  12 months..  allopurinoL..............  07- 07-    [facility] Embedded Care Due Messages. Reference number: [id number].   8/20/2024 2:45:29 PM CDT

## 2024-08-21 ENCOUNTER — PATIENT MESSAGE (OUTPATIENT)
Dept: CARDIOLOGY | Facility: CLINIC | Age: 69
End: 2024-08-21
Payer: MEDICARE

## 2024-08-21 DIAGNOSIS — I48.0 PAROXYSMAL ATRIAL FIBRILLATION: ICD-10-CM

## 2024-08-21 RX ORDER — METOPROLOL SUCCINATE 25 MG/1
12.5 TABLET, EXTENDED RELEASE ORAL DAILY
Qty: 45 TABLET | Refills: 2 | Status: SHIPPED | OUTPATIENT
Start: 2024-08-21

## 2024-08-21 NOTE — TELEPHONE ENCOUNTER
Refill Routing Note   Medication(s) are not appropriate for processing by [facility] for the following reason(s):        Drug-disease interaction    Required vitals abnormal: HR (!)49    [facility] action(s):  Defer     Requires labs : Yes      Medication Therapy Plan: Labs (Uric Acid ) outdated    Pharmacist review requested: Yes     Appointments  past 12m or future 3m with PCP    Date Provider   Last Visit   7/22/2024 Kirk Wilson MD   Next Visit   10/7/2024 Kirk Wilson MD   ED visits in past 90 days: 0        Note composed:1:24 PM 08/21/2024

## 2024-08-21 NOTE — TELEPHONE ENCOUNTER
No care due was identified.  [facility] Embedded Care Due Messages. Reference number: [id number].   8/21/2024 1:39:20 PM CDT

## 2024-08-22 ENCOUNTER — TELEPHONE (OUTPATIENT)
Dept: INTERNAL MEDICINE | Facility: CLINIC | Age: 69
End: 2024-08-22
Payer: MEDICARE

## 2024-08-22 ENCOUNTER — PATIENT MESSAGE (OUTPATIENT)
Dept: HEMATOLOGY/ONCOLOGY | Facility: CLINIC | Age: 69
End: 2024-08-22
Payer: MEDICARE

## 2024-08-22 RX ORDER — METOPROLOL SUCCINATE 25 MG/1
12.5 TABLET, EXTENDED RELEASE ORAL DAILY
Qty: 45 TABLET | Refills: 2 | OUTPATIENT
Start: 2024-08-22

## 2024-08-22 NOTE — TELEPHONE ENCOUNTER
----- Message from Deloris Wheat sent at 8/22/2024  2:16 PM CDT -----  Regarding: Reschedule Existing Appointment  Contact: pt @ [phone]  Reschedule Existing Appointment     Current appt date: 10/7     Type of appt : EP     Physician: Dr. Wilson     Reason for rescheduling:pt will be out of town, asking if appt can be rescheduled fter 10/9     Caller: Lukasz Person    Contact Preference:[phone]

## 2024-08-22 NOTE — TELEPHONE ENCOUNTER
Anabelle Wilson has approved this medication and sent the prescription to [facility] on 8/21/24 in a separate refill request encounter. Please disregard any denial/refusals for Metoprolol as I close out this encounter.     Thank you and have great day!     [facility]

## 2024-08-22 NOTE — TELEPHONE ENCOUNTER
Refill Decision Note   Lukasz Naya  is requesting a refill authorization.  Brief Assessment and Rationale for Refill:  Quick Discontinue     Medication Therapy Plan: E-Prescribing Status: Receipt confirmed by pharmacy (8/21/2024  1:30 PM CDT)      Comments:     Note composed:12:44 AM 08/22/2024

## 2024-08-23 ENCOUNTER — PATIENT MESSAGE (OUTPATIENT)
Dept: PODIATRY | Facility: CLINIC | Age: 69
End: 2024-08-23
Payer: MEDICARE

## 2024-08-23 NOTE — TELEPHONE ENCOUNTER
Spoke with Pharmacy, gave approval for pt to receive Rx sooner. Was informed it will be $15 if pt pick-up Rx prior to 8/24/24.

## 2024-08-23 NOTE — TELEPHONE ENCOUNTER
Pt advised medication will be $15 if pt pick-up Rx prior to 8/24/24. Pt stated he will pick-up meds on 8/24/24

## 2024-08-27 ENCOUNTER — LAB VISIT (OUTPATIENT)
Dept: LAB | Facility: HOSPITAL | Age: 69
End: 2024-08-27
Attending: INTERNAL MEDICINE
Payer: MEDICARE

## 2024-08-27 ENCOUNTER — PATIENT MESSAGE (OUTPATIENT)
Dept: CARDIOLOGY | Facility: CLINIC | Age: 69
End: 2024-08-27

## 2024-08-27 ENCOUNTER — ANTI-COAG VISIT (OUTPATIENT)
Dept: CARDIOLOGY | Facility: CLINIC | Age: 69
End: 2024-08-27
Payer: MEDICARE

## 2024-08-27 DIAGNOSIS — I48.0 PAROXYSMAL ATRIAL FIBRILLATION: Chronic | ICD-10-CM

## 2024-08-27 DIAGNOSIS — I48.0 PAROXYSMAL ATRIAL FIBRILLATION: Primary | Chronic | ICD-10-CM

## 2024-08-27 LAB
INR PPP: 3 (ref 0.8–1.2)
PROTHROMBIN TIME: 30.2 SEC (ref 9–12.5)

## 2024-08-27 PROCEDURE — 36415 COLL VENOUS BLD VENIPUNCTURE: CPT | Performed by: INTERNAL MEDICINE

## 2024-08-27 PROCEDURE — 85610 PROTHROMBIN TIME: CPT | Performed by: INTERNAL MEDICINE

## 2024-08-27 PROCEDURE — 93793 ANTICOAG MGMT PT WARFARIN: CPT | Mod: ,,,

## 2024-08-27 NOTE — PROGRESS NOTES
[facility]    08/27/2024 10:41 AM    Assessment/Plan:    Patient presents today with therapeutic INR.    Assessment of patient findings and chart review: INR at goal. However it has been at the end range at 3.0 for past 2 readings. Will slightly lower dose today to target closer to mid range. Patient will be out of town 8/31 - 10/9 and will provide lab locations to .     Recommendation for patient's warfarin regimen: Lower dose today to 4mg then resume current maintenance dose    Recommend repeat INR in 3 weeks  _________________________________________________________________    Lukasz Person (69 y.o.) is followed by the [facility].    Anticoagulation Summary  As of 8/27/2024      INR goal:  2.0-3.0   TTR:  56.7% (6.9 mo)   INR used for dosing:  3.0 (8/27/2024)   Warfarin maintenance plan:  6 mg (4 mg x 1.5) every Sun, Tue, Thu; 4 mg (4 mg x 1) all other days   Weekly warfarin total:  34 mg   Plan last modified:  Melissa Kinsey, PharmD (6/18/2024)   Next INR check:  9/17/2024   Target end date:      Indications    Paroxysmal atrial fibrillation [I48.0]                 Anticoagulation Episode Summary       INR check location:      Preferred lab:      Send INR reminders to:  [facility] COUMADIN MONITORING POOL    Comments:  [facility]          Anticoagulation Care Providers       Provider Role Specialty Phone number    Pallavi Nick MD [facility] Cardiology [phone]                             Detail Level: Detailed

## 2024-08-28 ENCOUNTER — OFFICE VISIT (OUTPATIENT)
Dept: PODIATRY | Facility: CLINIC | Age: 69
End: 2024-08-28
Payer: MEDICARE

## 2024-08-28 VITALS
HEART RATE: 49 BPM | BODY MASS INDEX: 27.74 KG/M2 | SYSTOLIC BLOOD PRESSURE: 121 MMHG | HEIGHT: 72 IN | DIASTOLIC BLOOD PRESSURE: 69 MMHG | WEIGHT: 204.81 LBS

## 2024-08-28 DIAGNOSIS — N18.31 TYPE 2 DIABETES MELLITUS WITH STAGE 3A CHRONIC KIDNEY DISEASE, WITHOUT LONG-TERM CURRENT USE OF INSULIN: ICD-10-CM

## 2024-08-28 DIAGNOSIS — E11.42 DIABETIC POLYNEUROPATHY ASSOCIATED WITH TYPE 2 DIABETES MELLITUS: Primary | ICD-10-CM

## 2024-08-28 DIAGNOSIS — D49.9 IMMUNODEFICIENCY SECONDARY TO NEOPLASM: ICD-10-CM

## 2024-08-28 DIAGNOSIS — B35.1 ONYCHOMYCOSIS DUE TO DERMATOPHYTE: ICD-10-CM

## 2024-08-28 DIAGNOSIS — E11.22 TYPE 2 DIABETES MELLITUS WITH STAGE 3A CHRONIC KIDNEY DISEASE, WITHOUT LONG-TERM CURRENT USE OF INSULIN: ICD-10-CM

## 2024-08-28 DIAGNOSIS — D84.81 IMMUNODEFICIENCY SECONDARY TO NEOPLASM: ICD-10-CM

## 2024-08-28 PROCEDURE — 11721 DEBRIDE NAIL 6 OR MORE: CPT | Mod: PBBFAC,PN | Performed by: PODIATRIST

## 2024-08-28 PROCEDURE — 99213 OFFICE O/P EST LOW 20 MIN: CPT | Mod: PBBFAC,PN,25 | Performed by: PODIATRIST

## 2024-08-28 PROCEDURE — 11721 DEBRIDE NAIL 6 OR MORE: CPT | Mod: Q9,S$PBB,, | Performed by: PODIATRIST

## 2024-08-28 PROCEDURE — 99999 PR PBB SHADOW E&M-EST. PATIENT-LVL III: CPT | Mod: PBBFAC,,, | Performed by: PODIATRIST

## 2024-08-28 PROCEDURE — 99499 UNLISTED E&M SERVICE: CPT | Mod: S$PBB,,, | Performed by: PODIATRIST

## 2024-08-28 NOTE — PROGRESS NOTES
Subjective:      Patient ID: Lukasz Person is a 69 y.o. male.    Chief Complaint:   Diabetes Mellitus (PCP- 07/22/2024/Kirk Wilson MD) and Nail Care    Lukasz is a 69 y.o. male who returns to the clinic or evaluation and treatment of diabetic feet. Lukasz has a past medical history of Anticoagulant long-term use, Cancer, Diabetes mellitus, Hyperlipidemia, Hypertension, Kidney stones, and Sleep apnea.      Doing ok.   Going on a long road trip soon    PCP: Kirk Wilson MD    Date Last Seen by PCP: 7/22/24    Current shoe gear: Extra depth shoes sandals    Hemoglobin A1C   Date Value Ref Range Status   04/01/2024 6.8 (H) 4.0 - 5.6 % Final     Comment:     ADA Screening Guidelines:  5.7-6.4%  Consistent with prediabetes  >or=6.5%  Consistent with diabetes    High levels of fetal hemoglobin interfere with the HbA1C  assay. Heterozygous hemoglobin variants (HbS, HgC, etc)do  not significantly interfere with this assay.   However, presence of multiple variants may affect accuracy.     01/08/2024 6.2 (H) 4.0 - 5.6 % Final     Comment:     ADA Screening Guidelines:  5.7-6.4%  Consistent with prediabetes  >or=6.5%  Consistent with diabetes    High levels of fetal hemoglobin interfere with the HbA1C  assay. Heterozygous hemoglobin variants (HbS, HgC, etc)do  not significantly interfere with this assay.   However, presence of multiple variants may affect accuracy.     05/29/2023 6.2 (H) 4.0 - 5.6 % Final     Comment:     ADA Screening Guidelines:  5.7-6.4%  Consistent with prediabetes  >or=6.5%  Consistent with diabetes    High levels of fetal hemoglobin interfere with the HbA1C  assay. Heterozygous hemoglobin variants (HbS, HgC, etc)do  not significantly interfere with this assay.   However, presence of multiple variants may affect accuracy.            Past Medical History:   Diagnosis Date    Anticoagulant long-term use     Cancer     Diabetes mellitus     Onset late 50s/early 60s    Hyperlipidemia     Hypertension     Onset  late 50s/early 60s    Kidney stones     Sleep apnea     since 2006     Past Surgical History:   Procedure Laterality Date    COLONOSCOPY N/A 10/26/2023    Procedure: COLONOSCOPY;  Surgeon: Noah Duong MD;  Location: [facility] (4TH FLR);  Service: Endoscopy;  Laterality: N/A;  instructions sent to patient portal. [facility]  referral: Dr. Wilson  Pt. on Xarelto--tb-ok to hold see te 8/15/23 dr vu  10/13-precall complete-MS  10/19 pt rescheduled, Xarelto hold, PEG, portal -ml  10/24-precall complete-KPvt    CORONARY ANGIOGRAPHY N/A 9/30/2020    Procedure: ANGIOGRAM, CORONARY ARTERY;  Surgeon: John West MD;  Location: [facility] CATH LAB;  Service: Cardiology;  Laterality: N/A;    CYSTOSCOPY N/A 2/17/2022    Procedure: CYSTOSCOPY;  Surgeon: Lukasz Hughes MD;  Location: [facility] OR 1ST FLR;  Service: Urology;  Laterality: N/A;    CYSTOSCOPY W/ URETERAL STENT PLACEMENT N/A 2/25/2022    Procedure: CYSTOSCOPY, WITH URETERAL STENT INSERTION;  Surgeon: Lukasz Hughes MD;  Location: [facility] OR 1ST FLR;  Service: Urology;  Laterality: N/A;    ENDOSCOPIC ULTRASOUND OF UPPER GASTROINTESTINAL TRACT N/A 12/7/2022    Procedure: ULTRASOUND, UPPER GI TRACT, ENDOSCOPIC;  Surgeon: Darian Main MD;  Location: [facility];  Service: Endoscopy;  Laterality: N/A;  Approval to hold Xarelto rec'd from Dr. Vu (see t/e 12/5/22)-DS    ESOPHAGOGASTRODUODENOSCOPY N/A 11/17/2022    Procedure: EGD (ESOPHAGOGASTRODUODENOSCOPY);  Surgeon: Brody Gonzales MD;  Location: [facility] (2ND FLR);  Service: Endoscopy;  Laterality: N/A;  inst via email-ok to hold Xarelto x 2 days-MS    ESOPHAGOGASTRODUODENOSCOPY N/A 5/31/2023    Procedure: EGD (ESOPHAGOGASTRODUODENOSCOPY) WITH DILATION;  Surgeon: Santana Brown Jr., MD;  Location: [facility] OR 2ND FLR;  Service: General;  Laterality: N/A;    ESOPHAGOGASTRODUODENOSCOPY N/A 5/3/2024    Procedure: EGD (ESOPHAGOGASTRODUODENOSCOPY) with botox injection;  Surgeon: Santana Brown Jr., MD;   Location: [facility] OR [facility] FLR;  Service: General;  Laterality: N/A;    INJECTION OF BOTULINUM TOXIN TYPE A  5/3/2024    Procedure: INJECTION, BOTULINUM TOXIN, TYPE A;  Surgeon: Santana Brown Jr., MD;  Location: [facility] OR [facility] FLR;  Service: General;;    LASER LITHOTRIPSY Left 2/25/2022    Procedure: LITHOTRIPSY, USING LASER;  Surgeon: Lukasz Hughes MD;  [facility];  Service: Urology;  Laterality: Left;    LEFT HEART CATHETERIZATION Left 9/30/2020    Procedure: Left heart cath;  Surgeon: John West MD;  Location: [facility] CATH LAB;  Service: Cardiology;  Laterality: Left;    LITHOTRIPSY      PARATHYROIDECTOMY  1/1/2-107    PYELOSCOPY Left 2/25/2022    Procedure: PYELOSCOPY;  Surgeon: Lukasz Hughes MD;  Location: [address];  Service: Urology;  Laterality: Left;    ROBOT-ASSISTED SURGICAL REMOVAL OF ESOPHAGUS USING DA CARLOS XI N/A 3/14/2023    Procedure: XI ROBOTIC ESOPHAGECTOMY;  Surgeon: Santana Brown Jr., MD;  [facility];  Service: General;  Laterality: N/A;  Abdomen, Chest and Neck    ROBOTIC JEJUNOSTOMY N/A 3/14/2023    Procedure: ROBOTIC JEJUNOSTOMY TUBE INSERTION;  Surgeon: Santana Brown Jr., MD;  [facility];  Service: General;  Laterality: N/A;    TRANSESOPHAGEAL ECHOCARDIOGRAPHY N/A 4/7/2022    Procedure: ECHOCARDIOGRAM, TRANSESOPHAGEAL;  Surgeon: [facility] Provider;  Location: [facility] EP LAB;  Service: Cardiology;  Laterality: N/A;    TREATMENT OF CARDIAC ARRHYTHMIA N/A 4/7/2022    Procedure: Cardioversion or Defibrillation;  Surgeon: Iris Fisher NP;  Location: [facility] EP LAB;  Service: Cardiology;  Laterality: N/A;  afib, dccv, artie, anes, EH, 3prep    URETEROSCOPIC REMOVAL OF URETERIC CALCULUS Left 2/25/2022    Procedure: REMOVAL, CALCULUS, URETER, URETEROSCOPIC;  Surgeon: Lukasz Hughes MD;  [facility];  Service: Urology;  Laterality: Left;    URETEROSCOPY Left 2/25/2022    Procedure: URETEROSCOPY;  Surgeon: Lukasz  STEVEN Hughes MD;  Location: [facility] OR [address] [place] [address];  Service: Urology;  Laterality: Left;    VOCAL CORD INJECTION Left 3/17/2023    Procedure: INJECTION, VOCAL CORD, LARYNGOSCOPIC;  Surgeon: Gm Altman MD;  Location: [facility] OR 2ND [facility];  Service: ENT;  Laterality: Left;     Current Outpatient Medications on File Prior to Visit   Medication Sig Dispense Refill    allopurinoL (ZYLOPRIM) 100 MG tablet TAKE 1 TABLET BY MOUTH EVERY DAY 90 tablet 0    blood sugar diagnostic (ACCU-CHEK ANTONELLA PLUS TEST STRP) Strp Use to test CBG four times daily E11.65 400 strip 3    blood-glucose meter kit Checks blood sugars 1x/daily. 1 each 12    hydroCHLOROthiazide (HYDRODIURIL) 25 MG tablet TAKE 1 TABLET(25 MG) BY MOUTH EVERY DAY 30 tablet 11    lancets (ACCU-CHEK SOFTCLIX LANCETS) Misc USE 1 EVERY DAY OR AS DIRECTED 100 each 3    losartan (COZAAR) 25 MG tablet Take 1 tablet (25 mg total) by mouth 2 (two) times a day. 180 tablet 3    metoprolol succinate (TOPROL-XL) 25 MG 24 hr tablet Take 0.5 tablets (12.5 mg total) by mouth once daily. 45 tablet 2    nitroGLYCERIN (NITROSTAT) 0.4 MG SL tablet Place 1 tablet (0.4 mg total) under the tongue every 5 (five) minutes as needed for Chest pain. If you need a third tablet, call 911 100 tablet 3    omeprazole (PRILOSEC) 40 MG capsule TAKE 1 CAPSULE(40 MG) BY MOUTH EVERY DAY 90 capsule 3    rosuvastatin (CRESTOR) 20 MG tablet Take 1 tablet (20 mg total) by mouth every evening. 90 tablet 3    tamsulosin (FLOMAX) 0.4 mg Cap TAKE 1 CAPSULE(0.4 MG) BY MOUTH EVERY DAY 30 capsule 11    testosterone (ANDROGEL) 20.25 mg/1.25 gram (1.62 %) GlPm Apply 4 pumps to shoulders daily 2 each 5    triamcinolone acetonide 0.1% (KENALOG) 0.1 % cream APPLY TOPICALLY TO THE AFFECTED AREA TWICE DAILY (Patient taking differently: 2 (two) times daily as needed. Apply to affected area) 45 g 2    warfarin (COUMADIN) 4 MG tablet 6mg PO Sun, Tues, Thurs and 4mg PO all other days -- OR AS DIRECTED BY COUMADIN CLINIC       clotrimazole (LOTRIMIN) 1 % cream Apply topically once daily. for 14 days (Patient taking differently: Apply topically as needed.) 45 g 0     No current facility-administered medications on file prior to visit.     Review of patient's allergies indicates:  No Known Allergies    Review of Systems   Constitutional: Negative for chills, decreased appetite, fever, malaise/fatigue, night sweats, weight gain and weight loss.   Cardiovascular:  Negative for chest pain, claudication, dyspnea on exertion, leg swelling, palpitations and syncope.   Respiratory:  Negative for cough and shortness of breath.    Endocrine: Negative for cold intolerance and heat intolerance.   Hematologic/Lymphatic: Negative for bleeding problem. Does not bruise/bleed easily.   Skin:  Positive for nail changes. Negative for color change, dry skin, flushing, itching, poor wound healing, rash, skin cancer, suspicious lesions and unusual hair distribution.   Musculoskeletal:  Positive for arthritis and stiffness. Negative for back pain, falls, gout, joint pain, joint swelling, muscle cramps, muscle weakness, myalgias and neck pain.   Gastrointestinal:  Negative for diarrhea, nausea and vomiting.   Neurological:  Positive for numbness and paresthesias. Negative for dizziness, focal weakness, light-headedness, tremors, vertigo and weakness.   Psychiatric/Behavioral:  Negative for altered mental status and depression. The patient does not have insomnia.    Allergic/Immunologic: Negative.            Objective:       Vitals:    08/28/24 1015   BP: 121/69   Pulse: (!) 49   Weight: 92.9 kg (204 lb 12.9 oz)   Height: 6' (1.829 m)   PainSc: 0-No pain   92.9 kg (204 lb 12.9 oz)     Physical Exam  Vitals reviewed.   Constitutional:       General: He is not in acute distress.     Appearance: He is well-developed. He is not ill-appearing, toxic-appearing or diaphoretic.      Comments: Proper supportive shoegear   Cardiovascular:      Pulses:           Dorsalis  pedis pulses are 2+ on the right side and 2+ on the left side.        Posterior tibial pulses are 1+ on the right side and 1+ on the left side.   Musculoskeletal:      Right lower leg: No tenderness. No edema.      Left lower leg: No tenderness. No edema.      Right foot: Decreased range of motion. Deformity, bunion and prominent metatarsal heads present. No tenderness or bony tenderness.      Left foot: Decreased range of motion. Deformity, bunion and prominent metatarsal heads present. No tenderness or bony tenderness.      Comments:  Flexible pes planus foot type w/ medial arch collapse and mild gastroc equinus       Reducible extensor and flexor contractures at the MTPJ and PIPJ of toes 2-5, bilat.          Feet:      Right foot:      Protective Sensation: 10 sites tested.  6 sites sensed.      Skin integrity: No ulcer, blister, skin breakdown, erythema, warmth, callus or fissure.      Toenail Condition: Right toenails are abnormally thick and long. Fungal disease present.     Left foot:      Protective Sensation: 10 sites tested.  6 sites sensed.      Skin integrity: No ulcer, blister, skin breakdown, erythema, warmth, callus or fissure.      Toenail Condition: Left toenails are abnormally thick and long. Fungal disease present.     Comments:   No open lesions    SWM decreased to digits and forefoot    Nails 1-10 thickened elongated discolored  .            + peeling skin     Skin:     General: Skin is warm and dry.      Capillary Refill: Capillary refill takes 2 to 3 seconds.      Coloration: Skin is not pale.      Findings: No erythema or rash.      Nails: There is no clubbing.   Neurological:      Mental Status: He is alert and oriented to person, place, and time.      Sensory: Sensory deficit present.      Gait: Gait abnormal.   Psychiatric:         Attention and Perception: Attention normal.         Mood and Affect: Mood normal.         Speech: Speech normal.         Behavior: Behavior normal.          Thought Content: Thought content normal.         Judgment: Judgment normal.               Assessment:       Encounter Diagnoses   Name Primary?    Diabetic polyneuropathy associated with type 2 diabetes mellitus Yes    Onychomycosis due to dermatophyte     Immunodeficiency secondary to neoplasm     Type 2 diabetes mellitus with stage 3a chronic kidney disease, without long-term current use of insulin                    Plan:       Lukasz was seen today for diabetes mellitus and nail care.    Diagnoses and all orders for this visit:    Diabetic polyneuropathy associated with type 2 diabetes mellitus    Onychomycosis due to dermatophyte    Immunodeficiency secondary to neoplasm    Type 2 diabetes mellitus with stage 3a chronic kidney disease, without long-term current use of insulin                I counseled the patient on his conditions, their implications and medical management.     Feet doing well     - Shoe inspection. Diabetic Foot Education. Patient reminded of the importance of good nutrition and blood sugar control to help prevent podiatric complications of diabetes. Patient instructed on proper foot hygeine. We discussed wearing proper shoe gear, daily foot inspections, never walking without protective shoe gear, never putting sharp instruments to feet      With patient's permission, the toenails mentioned above were aggressively reduced and debrided using a nail nipper, removing all offending nail and debris.        F/u 3 months sooner if need

## 2024-09-11 RX ORDER — ALLOPURINOL 100 MG/1
TABLET ORAL
Qty: 90 TABLET | Refills: 3 | Status: SHIPPED | OUTPATIENT
Start: 2024-09-11

## 2024-09-11 NOTE — TELEPHONE ENCOUNTER
Refill Routing Note   Medication(s) are not appropriate for processing by [facility] for the following reason(s):        Required labs outdated    [facility] action(s):  Defer               Appointments  past 12m or future 3m with PCP    Date Provider   Last Visit   7/22/2024 Kirk Wilson MD   Next Visit   10/10/2024 Kirk Wilson MD   ED visits in past 90 days: 0        Note composed:10:36 AM 09/11/2024

## 2024-09-11 NOTE — TELEPHONE ENCOUNTER
No care due was identified.  [facility] Embedded Care Due Messages. Reference number: [id number].   9/11/2024 5:29:17 AM CDT

## 2024-09-24 ENCOUNTER — ANTI-COAG VISIT (OUTPATIENT)
Dept: CARDIOLOGY | Facility: CLINIC | Age: 69
End: 2024-09-24
Payer: MEDICARE

## 2024-09-24 ENCOUNTER — PATIENT MESSAGE (OUTPATIENT)
Dept: CARDIOLOGY | Facility: CLINIC | Age: 69
End: 2024-09-24

## 2024-09-24 DIAGNOSIS — I48.0 PAROXYSMAL ATRIAL FIBRILLATION: Primary | Chronic | ICD-10-CM

## 2024-09-24 LAB — INR PPP: 2.5

## 2024-09-24 PROCEDURE — 93793 ANTICOAG MGMT PT WARFARIN: CPT | Mod: ,,,

## 2024-09-24 NOTE — PROGRESS NOTES
[facility]    09/24/2024    Lukasz Person (69 y.o.) is followed by the [facility].      Assessment/Plan:    Lukasz Person presents with a therapeutic INR. Goal INR: 2.0-3.0    Lab Results   Component Value Date    INR 2.5 09/24/2024    INR 3.0 (H) 08/27/2024    INR 3.0 (H) 08/06/2024       Assessment of patient findings per MA/LPN and chart review:   The following significant findings were found:   None    Recommendation for patient's warfarin regimen:   No change was made to warfarin therapy during this visit and patient has been instructed to continue their current warfarin regimen.    Recommended repeat INR in 6 weeks      Melissa Kinsey, PharmD, BCPS  Clinical Pharmacist - [facility]  Preferred Contact: Secure Messaging or In Basket Message

## 2024-10-10 ENCOUNTER — HOSPITAL ENCOUNTER (OUTPATIENT)
Dept: RADIOLOGY | Facility: HOSPITAL | Age: 69
Discharge: HOME | End: 2024-10-10
Attending: INTERNAL MEDICINE
Payer: MEDICARE

## 2024-10-10 ENCOUNTER — OFFICE VISIT (OUTPATIENT)
Dept: INTERNAL MEDICINE | Facility: CLINIC | Age: 69
End: 2024-10-10
Payer: MEDICARE

## 2024-10-10 ENCOUNTER — PATIENT MESSAGE (OUTPATIENT)
Dept: HEMATOLOGY/ONCOLOGY | Facility: CLINIC | Age: 69
End: 2024-10-10
Payer: MEDICARE

## 2024-10-10 VITALS
OXYGEN SATURATION: 98 % | DIASTOLIC BLOOD PRESSURE: 70 MMHG | WEIGHT: 210.13 LBS | HEART RATE: 47 BPM | TEMPERATURE: 98 F | BODY MASS INDEX: 28.46 KG/M2 | SYSTOLIC BLOOD PRESSURE: 138 MMHG | HEIGHT: 72 IN | RESPIRATION RATE: 16 BRPM

## 2024-10-10 DIAGNOSIS — I25.10 CORONARY ARTERY DISEASE INVOLVING NATIVE CORONARY ARTERY OF NATIVE HEART WITHOUT ANGINA PECTORIS: Chronic | ICD-10-CM

## 2024-10-10 DIAGNOSIS — E11.69 HYPERLIPIDEMIA ASSOCIATED WITH TYPE 2 DIABETES MELLITUS: Chronic | ICD-10-CM

## 2024-10-10 DIAGNOSIS — E11.9 DM TYPE 2 WITHOUT RETINOPATHY: Chronic | ICD-10-CM

## 2024-10-10 DIAGNOSIS — C15.9 ESOPHAGEAL ADENOCARCINOMA: ICD-10-CM

## 2024-10-10 DIAGNOSIS — I70.0 AORTIC ATHEROSCLEROSIS: Chronic | ICD-10-CM

## 2024-10-10 DIAGNOSIS — I48.0 PAROXYSMAL ATRIAL FIBRILLATION: Chronic | ICD-10-CM

## 2024-10-10 DIAGNOSIS — I50.20 HFREF (HEART FAILURE WITH REDUCED EJECTION FRACTION): Chronic | ICD-10-CM

## 2024-10-10 DIAGNOSIS — Z23 NEED FOR SHINGLES VACCINE: ICD-10-CM

## 2024-10-10 DIAGNOSIS — E78.5 HYPERLIPIDEMIA ASSOCIATED WITH TYPE 2 DIABETES MELLITUS: Chronic | ICD-10-CM

## 2024-10-10 DIAGNOSIS — Z00.6 CLINICAL TRIAL PARTICIPANT: ICD-10-CM

## 2024-10-10 DIAGNOSIS — E11.9 TYPE 2 DIABETES MELLITUS WITHOUT COMPLICATION, WITHOUT LONG-TERM CURRENT USE OF INSULIN: Primary | Chronic | ICD-10-CM

## 2024-10-10 DIAGNOSIS — I10 ESSENTIAL HYPERTENSION: Chronic | ICD-10-CM

## 2024-10-10 DIAGNOSIS — E11.36 DIABETES MELLITUS WITH CATARACT: Chronic | ICD-10-CM

## 2024-10-10 PROCEDURE — 99999 PR PBB SHADOW E&M-EST. PATIENT-LVL IV: CPT | Mod: PBBFAC,,, | Performed by: INTERNAL MEDICINE

## 2024-10-10 PROCEDURE — G2211 COMPLEX E/M VISIT ADD ON: HCPCS | Mod: S$PBB,,, | Performed by: INTERNAL MEDICINE

## 2024-10-10 PROCEDURE — 99214 OFFICE O/P EST MOD 30 MIN: CPT | Mod: S$PBB,,, | Performed by: INTERNAL MEDICINE

## 2024-10-10 PROCEDURE — 99214 OFFICE O/P EST MOD 30 MIN: CPT | Mod: PBBFAC,PO | Performed by: INTERNAL MEDICINE

## 2024-10-10 PROCEDURE — A9698 NON-RAD CONTRAST MATERIALNOC: HCPCS | Performed by: INTERNAL MEDICINE

## 2024-10-10 PROCEDURE — 74177 CT ABD & PELVIS W/CONTRAST: CPT | Mod: TC

## 2024-10-10 PROCEDURE — 25500020 PHARM REV CODE 255: Performed by: INTERNAL MEDICINE

## 2024-10-10 RX ORDER — VARICELLA-ZOSTER GE VAC,2 OF 2 50 MCG
1 VIAL (EA) INTRAMUSCULAR ONCE
Qty: 1 EACH | Refills: 1 | Status: SHIPPED | OUTPATIENT
Start: 2024-10-10 | End: 2024-10-10

## 2024-10-10 RX ADMIN — BARIUM SULFATE 450 ML: 20 SUSPENSION ORAL at 03:10

## 2024-10-10 RX ADMIN — IOHEXOL 100 ML: 350 INJECTION, SOLUTION INTRAVENOUS at 03:10

## 2024-10-10 NOTE — PROGRESS NOTES
1. Have you been to the ER, urgent care clinic since your last visit? Hospitalized since your last visit? No    2. Have you seen or consulted any other health care providers outside of the [address] [place] [address] since your last visit? Include any pap smears or colon screening.  No     Chief Complaint   Patient presents with    Complete Physical    Labs     Fasting Assessment:       1. Type 2 diabetes mellitus without complication, without long-term current use of insulin  - Hemoglobin A1C; Future    2. Diabetes mellitus with cataract    3. DM type 2 without retinopathy    4. Essential hypertension    5. Hyperlipidemia associated with type 2 diabetes mellitus    6. Aortic atherosclerosis    7. Coronary artery disease involving native coronary artery of native heart without angina pectoris    8. Paroxysmal atrial fibrillation    9. HFrEF (heart failure with reduced ejection fraction)    10. Need for shingles vaccine  - varicella-zoster gE vac,2 of 2 (SHINGRIX GE ANTIGEN COMPONENT) 50 mcg SusR; Inject 0.5 mLs into the muscle once. for 1 dose  Dispense: 1 each; Refill: 1        Plan:       1/2/3.  Diet and exercise control.    4.  Continue losartan 25 mg, Toprol-XL 25 mg, HCTZ 25 mg.    5/6.  Continue Crestor 20 mg.  7.  Continue Crestor 20 mg p.o.   8.  Continue Toprol-XL 25 mg p.o.  9.  Continue losartan 25 mg, Toprol-XL 25 mg.    Assessment & Plan    IMPRESSION:  1. Assessed diabetes management: blood sugar 111-122 mg/dL, consistent with last A1C of 6.8%  2. Evaluated hypertension control: home blood pressure readings in 120s/70s-80s range  3. Reviewed atrial fibrillation management: Toprol XL effectively controlling heart rate without side effects  4. Considered vaccination needs: recommended flu, COVID-19, and shingles vaccines    SHINGLES VACCINATION:   Explained shingles vaccine effectiveness (97%), inability to cause shingles, and potential side effects including fever, chills, and injection site reactions.   Discussed importance of shingles vaccination, even if chickenpox history is uncertain.   Educated on severe nature of chickenpox in older adults who have never had it, and how shingles vaccine provides protection.   Mr. Person to get shingles vaccine after COVID-19 vaccine, with at least a week interval between vaccinations.    INFLUENZA VACCINATION:   Mr. Person to get  flu vaccine at the pharmacy today.    COVID-19 VACCINATION:   Mr. Person to get COVID-19 vaccine at the pharmacy in 1 week.    HYPERTENSION:   Continued losartan 25 mg, Toprol XL 25 mg, and HCTZ 25 mg.    HYPERLIPIDEMIA:   Continued Crestor 20 mg.    DIABETES MANAGEMENT:   A1C test ordered.    FOLLOW UP:   Follow up in 6 months.   Follow up for lab appointment for A1C test on the same day as port flushing at the chemo lab.   Contact the office if chemo lab unable to perform A1C test during port flushing, to proceed with scheduled lab appointment.                   This note was generated with the assistance of ambient listening technology. Verbal consent was obtained by the patient and accompanying visitor(s) for the recording of patient appointment to facilitate this note. I attest to having reviewed and edited the generated note for accuracy, though some syntax or spelling errors may persist. Please contact the author of this note for any clarification.       Subjective:       Patient ID: Lukasz Person is a 69 y.o. male.    Chief Complaint: Follow-up    HPI    69-year-old male here for follow-up.    Patient has diabetes that is diet and exercise controlled  Lab Results   Component Value Date    HGBA1C 6.8 (H) 04/01/2024    HGBA1C 6.2 (H) 01/08/2024    HGBA1C 6.2 (H) 05/29/2023     Lab Results   Component Value Date    LDLCALC 29.2 (L) 08/06/2024    CREATININE 1.1 10/10/2024     Patient has hypertension on losartan 25 mg, Toprol-XL 25 mg, HCTZ 25 mg.    Patient has hyperlipidemia on Crestor 20 mg.    Patient has coronary artery disease on losartan 25 mg, Crestor 20 mg.    Patient has paroxysmal atrial fibrillation on Toprol-XL 25 mg.    Patient has heart failure with preserved ejection fraction on losartan 25 mg, Toprol-XL 25 mg.    History of Present Illness    CHIEF COMPLAINT:  Mr. Person presents today for follow up.    CARDIOVASCULAR:  He has a history of hypertension, hyperlipidemia, coronary artery disease,  paroxysmal atrial fibrillation, and heart failure with preserved EF. His blood pressure is generally in the elevated range, sometimes reaching stage 1 hypertension, but often settles to 120s/70s-80s upon rechecking. Recent blood pressure readings range from 120s/70s to 130s/80s.    DIABETES:  He has diabetes, which is diet and exercise controlled. His blood sugar typically ranges between 111 and 122, which aligns with his last A1C of 6.8 from April. He acknowledges inconsistent monitoring during a recent six-week trip but typically checks his blood sugar regularly.    MEDICATIONS:  Current medications include Losartan 25 mg, Toprol XL 25 mg, Crestor 20 mg, and HCTZ 25 mg.    VACCINATIONS:  He reports no recent flu vaccine or COVID-19 vaccination. He has never received a shingles vaccination and is uncertain about his history of chickenpox.    PORT:  He has a port that requires flushing within the next week. He inquires about the possibility of obtaining a blood draw during the port flushing appointment.    SOCIAL HISTORY:  He is retired and recently returned from a six-week trip.         Review of Systems          Objective:      Physical Exam  Vitals reviewed.   Constitutional:       Appearance: He is well-developed.   HENT:      Head: Normocephalic and atraumatic.      Mouth/Throat:      Pharynx: No oropharyngeal exudate.   Eyes:      General: No scleral icterus.        Right eye: No discharge.         Left eye: No discharge.      Pupils: Pupils are equal, round, and reactive to light.   Neck:      Thyroid: No thyromegaly.      Trachea: No tracheal deviation.   Cardiovascular:      Rate and Rhythm: Normal rate and regular rhythm.      Heart sounds: Normal heart sounds. No murmur heard.     No friction rub. No gallop.   Pulmonary:      Effort: Pulmonary effort is normal. No respiratory distress.      Breath sounds: Normal breath sounds. No wheezing or rales.   Chest:      Chest wall: No tenderness.   Abdominal:       General: Bowel sounds are normal. There is no distension.      Palpations: Abdomen is soft. There is no mass.      Tenderness: There is no abdominal tenderness. There is no guarding or rebound.   Musculoskeletal:         General: No tenderness. Normal range of motion.      Cervical back: Normal range of motion and neck supple.   Skin:     General: Skin is warm and dry.      Coloration: Skin is not pale.      Findings: No erythema or rash.   Neurological:      Mental Status: He is alert and oriented to person, place, and time.   Psychiatric:         Behavior: Behavior normal.

## 2024-10-11 ENCOUNTER — RESEARCH ENCOUNTER (OUTPATIENT)
Dept: RESEARCH | Facility: HOSPITAL | Age: 69
End: 2024-10-11
Payer: MEDICARE

## 2024-10-11 ENCOUNTER — INFUSION (OUTPATIENT)
Dept: INFUSION THERAPY | Facility: HOSPITAL | Age: 69
End: 2024-10-11
Payer: MEDICARE

## 2024-10-11 ENCOUNTER — OFFICE VISIT (OUTPATIENT)
Dept: HEMATOLOGY/ONCOLOGY | Facility: CLINIC | Age: 69
End: 2024-10-11
Payer: MEDICARE

## 2024-10-11 VITALS
BODY MASS INDEX: 28.52 KG/M2 | DIASTOLIC BLOOD PRESSURE: 64 MMHG | SYSTOLIC BLOOD PRESSURE: 119 MMHG | WEIGHT: 210.56 LBS | TEMPERATURE: 99 F | OXYGEN SATURATION: 98 % | HEIGHT: 72 IN | HEART RATE: 48 BPM

## 2024-10-11 DIAGNOSIS — E78.5 HYPERLIPIDEMIA ASSOCIATED WITH TYPE 2 DIABETES MELLITUS: ICD-10-CM

## 2024-10-11 DIAGNOSIS — C15.9 ESOPHAGEAL ADENOCARCINOMA: Primary | ICD-10-CM

## 2024-10-11 DIAGNOSIS — I15.2 HYPERTENSION ASSOCIATED WITH DIABETES: ICD-10-CM

## 2024-10-11 DIAGNOSIS — E11.9 TYPE 2 DIABETES MELLITUS WITHOUT COMPLICATION, WITHOUT LONG-TERM CURRENT USE OF INSULIN: Chronic | ICD-10-CM

## 2024-10-11 DIAGNOSIS — J38.00 VOCAL CORD PARALYSIS: ICD-10-CM

## 2024-10-11 DIAGNOSIS — D69.6 THROMBOCYTOPENIA: ICD-10-CM

## 2024-10-11 DIAGNOSIS — E11.22 TYPE 2 DIABETES MELLITUS WITH STAGE 3 CHRONIC KIDNEY DISEASE, WITHOUT LONG-TERM CURRENT USE OF INSULIN, UNSPECIFIED WHETHER STAGE 3A OR 3B CKD: ICD-10-CM

## 2024-10-11 DIAGNOSIS — I50.20 HFREF (HEART FAILURE WITH REDUCED EJECTION FRACTION): ICD-10-CM

## 2024-10-11 DIAGNOSIS — E11.69 HYPERLIPIDEMIA ASSOCIATED WITH TYPE 2 DIABETES MELLITUS: ICD-10-CM

## 2024-10-11 DIAGNOSIS — E11.59 HYPERTENSION ASSOCIATED WITH DIABETES: ICD-10-CM

## 2024-10-11 DIAGNOSIS — N18.30 TYPE 2 DIABETES MELLITUS WITH STAGE 3 CHRONIC KIDNEY DISEASE, WITHOUT LONG-TERM CURRENT USE OF INSULIN, UNSPECIFIED WHETHER STAGE 3A OR 3B CKD: ICD-10-CM

## 2024-10-11 DIAGNOSIS — R13.19 ESOPHAGEAL DYSPHAGIA: ICD-10-CM

## 2024-10-11 DIAGNOSIS — I25.10 CORONARY ARTERY DISEASE INVOLVING NATIVE CORONARY ARTERY OF NATIVE HEART WITHOUT ANGINA PECTORIS: ICD-10-CM

## 2024-10-11 DIAGNOSIS — I48.0 PAROXYSMAL ATRIAL FIBRILLATION: ICD-10-CM

## 2024-10-11 LAB
ESTIMATED AVG GLUCOSE: 148 MG/DL (ref 68–131)
HBA1C MFR BLD: 6.8 % (ref 4–5.6)

## 2024-10-11 PROCEDURE — 63600175 PHARM REV CODE 636 W HCPCS: Performed by: INTERNAL MEDICINE

## 2024-10-11 PROCEDURE — 99214 OFFICE O/P EST MOD 30 MIN: CPT | Mod: PBBFAC | Performed by: REGISTERED NURSE

## 2024-10-11 PROCEDURE — A4216 STERILE WATER/SALINE, 10 ML: HCPCS | Performed by: INTERNAL MEDICINE

## 2024-10-11 PROCEDURE — 25000003 PHARM REV CODE 250: Performed by: INTERNAL MEDICINE

## 2024-10-11 PROCEDURE — 36591 DRAW BLOOD OFF VENOUS DEVICE: CPT

## 2024-10-11 PROCEDURE — 83036 HEMOGLOBIN GLYCOSYLATED A1C: CPT | Performed by: INTERNAL MEDICINE

## 2024-10-11 RX ORDER — SODIUM CHLORIDE 0.9 % (FLUSH) 0.9 %
10 SYRINGE (ML) INJECTION
Status: DISCONTINUED | OUTPATIENT
Start: 2024-10-11 | End: 2024-10-11 | Stop reason: HOSPADM

## 2024-10-11 RX ORDER — HEPARIN 100 UNIT/ML
500 SYRINGE INTRAVENOUS
Status: DISCONTINUED | OUTPATIENT
Start: 2024-10-11 | End: 2024-10-11 | Stop reason: HOSPADM

## 2024-10-11 RX ADMIN — HEPARIN 500 UNITS: 100 SYRINGE at 10:10

## 2024-10-11 RX ADMIN — Medication 10 ML: at 10:10

## 2024-10-11 NOTE — PROGRESS NOTES
MEDICAL ONCOLOGY - ESTABLISHED PATIENT VISIT    Reason for visit: esophageal cancer    Best Contact Phone Number(s): There are no phone numbers on file.     Cancer/Stage/TNM:    Cancer Staging   Esophageal adenocarcinoma  Staging form: Esophagus - Adenocarcinoma, AJCC 8th Edition  - Pathologic stage from 3/28/2023: Stage II (ypT3, pN0, cM0, G2) - Signed by Santana Brown Jr., MD on 3/28/2023       Oncology History   Esophageal adenocarcinoma   12/8/2022 Initial Diagnosis    Esophageal adenocarcinoma     12/21/2022 - 12/21/2022 Chemotherapy    Treatment Summary   Plan Name: OP ESOPHAGEAL PACLITAXEL CARBOPLATIN WEEKLY  Treatment Goal: Curative  Status: Inactive  Start Date:   End Date:   Provider: Miki Medrano MD  Chemotherapy: CARBOplatin (PARAPLATIN) in sodium chloride 0.9% 250 mL chemo infusion, , Intravenous, Clinic/HOD 1 time, 0 of 1 cycle  PACLitaxeL (TAXOL) 50 mg/m2 = 120 mg in sodium chloride 0.9% 250 mL chemo infusion, 50 mg/m2, Intravenous, Clinic/HOD 1 time, 0 of 1 cycle     12/29/2022 - 1/20/2023 Chemotherapy    Treatment Summary   Plan Name: [facility] [id number] ARM B CARBOPLATIN PACLITAXEL NIVOLUMAB  Treatment Goal: Curative  Status: Inactive  Start Date: 12/29/2022  End Date: 1/20/2023  Provider: Miki Medrano MD  Chemotherapy: CARBOplatin (PARAPLATIN) 215 mg in sodium chloride 0.9% 306.5 mL chemo infusion, 215 mg, Intravenous, Clinic/HOD 1 time, 4 of 5 cycles  Administration: 215 mg (12/29/2022), 230 mg (1/5/2023), 265 mg (1/13/2023), 265 mg (1/20/2023)  PACLitaxeL (TAXOL) 50 mg/m2 = 120 mg in sodium chloride 0.9% 250 mL chemo infusion, 50 mg/m2 = 120 mg, Intravenous, Clinic/HOD 1 time, 4 of 5 cycles  Dose modification: 50 mg/m2 (original dose 50 mg/m2, Cycle 4)  Administration: 120 mg (12/29/2022), 120 mg (1/5/2023), 120 mg (1/13/2023), 120 mg (1/20/2023)     12/29/2022 - 2/1/2023 Radiation Therapy    Treating physician: Dr. Javier Moulton    Course: C1 CHEST 2022    Treatment Site Ref.  ID Energy Dose/Fx (Gy) #Fx Dose Correction (Gy) Total Dose (Gy) Start Date End Date Elapsed Days   IM Esophagus [id number] 6X 1.8 23 / 23 0 41.4 12/29/2022 2/1/2023 34          3/17/2023 Surgery    Procedure: Procedure(s) (LRB):  XI ROBOTIC ESOPHAGECTOMY (N/A)  ROBOTIC JEJUNOSTOMY TUBE INSERTION (N/A)      Surgeon(s) and Role:     * Santana Brown Jr., MD - Primary     * Darian Murphy MD - Assisting     Assistance: Due to having no qualified resident during critical portions of the case, Dr. Darian Murphy acted as an assistant.     Pre-Operative Diagnosis: Esophageal adenocarcinoma, distal 1/3     Post-Operative Diagnosis: Same     Pre-Operative Variables:  Stage: T3 N0  Adjacent organ involvement: None  Chemotherapy within 90 Days: Yes  Radiation Therapy within 90 Days: Yes     Comorbidities:  Morbid obesity  Type 2 diabetes mellitus  Obstructive sleep apnea  Gout  Hyperparathyroidism  Hypertension  Severe obesity  Coronary artery disease  Heart failure with reduced ejection fraction    Procedure:  Robotic-assisted Vj three field esophagectomy  Regional mediastinal lymph node dissection  Botox injection of the pylorus  Jejunostomy tube placement     Operative Findings:                No evidence of distant intraperitoneal metastases in the chest or abdomen  Esophageal tumor located in the distal third of the esophagus, mild radiation changes appreciated.  Resection status:  R0 pending final pathology.  No gross disease remaining post dissection.  Frozen sections on proximal and distal margins negative for malignancy  100u Botox injection into the pylorus  Stapled esophagogastrostomy performed at the neck incision after confirmation of negative margins.  Jejunostomy tube placed      3/28/2023 Cancer Staged    Staging form: Esophagus - Adenocarcinoma, AJCC 8th Edition  - Pathologic stage from 3/28/2023: Stage II (ypT3, pN0, cM0, G2)     5/1/2023 - 5/1/2023 Chemotherapy    Treatment Summary   Plan Name: [facility] [id number]  ARM C ADJUVANT NIVOLUMAB  Treatment Goal: Curative  Status: Inactive  Start Date: 5/1/2023  End Date: 5/1/2023  Provider: Miki Medrano MD  Chemotherapy: [No matching medication found in this treatment plan]     5/29/2023 -  Chemotherapy    Treatment Summary   Plan Name: [facility] [id number] ARM C ADJUVANT NIVOLUMAB STEP 2  Treatment Goal: Curative  Status: Active  Start Date: 5/29/2023  End Date: 4/29/2024 (Planned)  Provider: Miki Medrano MD  Chemotherapy: [No matching medication found in this treatment plan]          Interim History:   Mr. Person returns to clinic today for follow-up while on surveillance after completion of adjuvant nivolumab. He underwent robotic esophagectomy on 3/14/23 with Dr. Brown and J tube insertion.     Doing well overall. Recently returned from long trip to [place], [place], [place], [place] and [place]. Eating well and tolerating essentially whatever he wants. No significant dysphagia. He does have to vomit occasionally if he eats too much. Otherwise, no concerns.     Presents to clinic alone. ECOG 0.     Review of Systems   Constitutional:  Negative for chills, diaphoresis, fever, malaise/fatigue and weight loss.   HENT:  Negative for sore throat.    Eyes:  Negative for blurred vision and double vision.   Respiratory:  Negative for cough and shortness of breath.    Cardiovascular:  Negative for chest pain, palpitations and leg swelling.   Gastrointestinal:  Negative for abdominal pain, blood in stool, constipation, diarrhea, heartburn, nausea and vomiting.   Genitourinary:  Negative for dysuria, frequency, hematuria and urgency.   Musculoskeletal:  Negative for back pain, falls, myalgias and neck pain.   Skin:  Negative for itching and rash.   Neurological:  Negative for dizziness, tingling, weakness and headaches.   Psychiatric/Behavioral:  The patient is not nervous/anxious.      Past Medical History:   Past Medical History:   Diagnosis Date    Anticoagulant long-term  use     Cancer     Diabetes mellitus     Onset late 50s/early 60s    Hyperlipidemia     Hypertension     Onset late 50s/early 60s    Kidney stones     Sleep apnea     since 2006      Past Surgical History:   Past Surgical History:   Procedure Laterality Date    COLONOSCOPY N/A 10/26/2023    Procedure: COLONOSCOPY;  Surgeon: Noah Duong MD;  Location: [facility] (4TH FLR);  Service: Endoscopy;  Laterality: N/A;  instructions sent to patient portal. Tb  referral: Dr. Wilson  Pt. on Xarelto--tb-ok to hold see te 8/15/23 dr vu  10/13-precall complete-MS  10/19 pt rescheduled, Xarelto hold, PEG, portal -ml  10/24-precall complete-KPvt    CORONARY ANGIOGRAPHY N/A 9/30/2020    Procedure: ANGIOGRAM, CORONARY ARTERY;  Surgeon: John West MD;  Location: [facility] CATH LAB;  Service: Cardiology;  Laterality: N/A;    CYSTOSCOPY N/A 2/17/2022    Procedure: CYSTOSCOPY;  Surgeon: Lukasz Hughes MD;  Location: [facility] OR 1ST FLR;  Service: Urology;  Laterality: N/A;    CYSTOSCOPY W/ URETERAL STENT PLACEMENT N/A 2/25/2022    Procedure: CYSTOSCOPY, WITH URETERAL STENT INSERTION;  Surgeon: Lukasz Hughes MD;  [facility];  Service: Urology;  Laterality: N/A;    ENDOSCOPIC ULTRASOUND OF UPPER GASTROINTESTINAL TRACT N/A 12/7/2022    Procedure: ULTRASOUND, UPPER GI TRACT, ENDOSCOPIC;  Surgeon: Darian Main MD;  Location: [facility];  Service: Endoscopy;  Laterality: N/A;  Approval to hold Xarelto rec'd from Dr. Vu (see t/e 12/5/22)-DS    ESOPHAGOGASTRODUODENOSCOPY N/A 11/17/2022    Procedure: EGD (ESOPHAGOGASTRODUODENOSCOPY);  Surgeon: Brody Gonzales MD;  Location: [facility] (2ND FLR);  Service: Endoscopy;  Laterality: N/A;  inst via email-ok to hold Xarelto x 2 days-MS    ESOPHAGOGASTRODUODENOSCOPY N/A 5/31/2023    Procedure: EGD (ESOPHAGOGASTRODUODENOSCOPY) WITH DILATION;  Surgeon: Santana Brown Jr., MD;  Location: [facility] OR [address] [place] [address];  Service: General;  Laterality: N/A;     ESOPHAGOGASTRODUODENOSCOPY N/A 5/3/2024    Procedure: EGD (ESOPHAGOGASTRODUODENOSCOPY) with botox injection;  Surgeon: Santana Brown Jr., MD;  [facility];  Service: General;  Laterality: N/A;    INJECTION OF BOTULINUM TOXIN TYPE A  5/3/2024    Procedure: INJECTION, BOTULINUM TOXIN, TYPE A;  Surgeon: Santana Brown Jr., MD;  [facility];  Service: General;;    LASER LITHOTRIPSY Left 2/25/2022    Procedure: LITHOTRIPSY, USING LASER;  Surgeon: Lukasz Hughes MD;  [facility];  Service: Urology;  Laterality: Left;    LEFT HEART CATHETERIZATION Left 9/30/2020    Procedure: Left heart cath;  Surgeon: John West MD;  Location: [facility] CATH LAB;  Service: Cardiology;  Laterality: Left;    LITHOTRIPSY      PARATHYROIDECTOMY  1/1/2-107    PYELOSCOPY Left 2/25/2022    Procedure: PYELOSCOPY;  Surgeon: Lukasz Hughes MD;  Location: [facility] OR [address] [place] [address];  Service: Urology;  Laterality: Left;    ROBOT-ASSISTED SURGICAL REMOVAL OF ESOPHAGUS USING DA CARLOS XI N/A 3/14/2023    Procedure: XI ROBOTIC ESOPHAGECTOMY;  Surgeon: Santana Brown Jr., MD;  Location: [facility] OR [address] [place] [address];  Service: General;  Laterality: N/A;  Abdomen, Chest and Neck    ROBOTIC JEJUNOSTOMY N/A 3/14/2023    Procedure: ROBOTIC JEJUNOSTOMY TUBE INSERTION;  Surgeon: Santana Brown Jr., MD;  Location: [facility] OR [address] [place] [address];  Service: General;  Laterality: N/A;    TRANSESOPHAGEAL ECHOCARDIOGRAPHY N/A 4/7/2022    Procedure: ECHOCARDIOGRAM, TRANSESOPHAGEAL;  Surgeon: Xander Diagnostic Provider;  Location: [facility] EP LAB;  Service: Cardiology;  Laterality: N/A;    TREATMENT OF CARDIAC ARRHYTHMIA N/A 4/7/2022    Procedure: Cardioversion or Defibrillation;  Surgeon: Iris Fisher NP;  Location: [facility] EP LAB;  Service: Cardiology;  Laterality: N/A;  afib, dccv, artie, anes, EH, 3prep    URETEROSCOPIC REMOVAL OF URETERIC CALCULUS Left 2/25/2022    Procedure: REMOVAL, CALCULUS, URETER, URETEROSCOPIC;  Surgeon: Lukasz Hughes,  MD;  Location: [facility] OR 1ST FLR;  Service: Urology;  Laterality: Left;    URETEROSCOPY Left 2022    Procedure: URETEROSCOPY;  Surgeon: Lukasz Hughes MD;  Location: [facility] OR 1ST FLR;  Service: Urology;  Laterality: Left;    VOCAL CORD INJECTION Left 3/17/2023    Procedure: INJECTION, VOCAL CORD, LARYNGOSCOPIC;  Surgeon: Gm Altman MD;  Location: [facility] OR 2ND FLR;  Service: ENT;  Laterality: Left;      Family History:   Family History   Problem Relation Name Age of Onset    Arthritis Mother Juany     Heart disease Father Diomedes 70        CABG    Arthritis Father Diomedes     Diabetes Father Diomedes     Kidney disease Father Diomedes         had one kidney removed in early thirties    Arthritis Sister Dee     Arthritis Sister Josette     Hypertension Sister Delores     Colon cancer Brother Sterling 32    Arthritis Brother Diomedes     Alcohol abuse Paternal Aunt Betina     Cancer Maternal Grandfather Yaron         throat cancer    Diabetes Paternal Grandmother Cassandra     Colon polyps Paternal Grandfather Diomedes     Colon cancer Paternal Grandfather Diomedes     Cancer Paternal Grandfather Diomedes         colon cancer at age 62      Social History:   Social History     Tobacco Use    Smoking status: Former     Current packs/day: 0.00     Average packs/day: 1 pack/day for 22.7 years (22.7 ttl pk-yrs)     Types: Cigarettes, Cigars     Start date: 1972     Quit date:      Years since quittin.4     Passive exposure: Never    Smokeless tobacco: Never    Tobacco comments:     smoking was off and on.  cumulative 7 years.  never more than three years in one stretch or more lj   Substance Use Topics    Alcohol use: Yes     Alcohol/week: 4.0 standard drinks of alcohol     Types: 4 Shots of liquor per week      I have reviewed and updated the patient's past medical, surgical, family and social histories.    Allergies:   Review of patient's allergies indicates:  No Known Allergies      Medications:   Current Outpatient Medications   Medication Sig Dispense Refill    allopurinoL (ZYLOPRIM) 100 MG tablet TAKE 1 TABLET BY MOUTH EVERY DAY 90 tablet 3    blood sugar diagnostic (ACCU-CHEK ANTONELLA PLUS TEST STRP) Strp Use to test CBG four times daily E11.65 400 strip 3    blood-glucose meter kit Checks blood sugars 1x/daily. 1 each 12    hydroCHLOROthiazide (HYDRODIURIL) 25 MG tablet TAKE 1 TABLET(25 MG) BY MOUTH EVERY DAY 30 tablet 11    lancets (ACCU-CHEK SOFTCLIX LANCETS) Misc USE 1 EVERY DAY OR AS DIRECTED 100 each 3    losartan (COZAAR) 25 MG tablet Take 1 tablet (25 mg total) by mouth 2 (two) times a day. 180 tablet 3    metoprolol succinate (TOPROL-XL) 25 MG 24 hr tablet Take 0.5 tablets (12.5 mg total) by mouth once daily. 45 tablet 2    nitroGLYCERIN (NITROSTAT) 0.4 MG SL tablet Place 1 tablet (0.4 mg total) under the tongue every 5 (five) minutes as needed for Chest pain. If you need a third tablet, call 911 100 tablet 3    omeprazole (PRILOSEC) 40 MG capsule TAKE 1 CAPSULE(40 MG) BY MOUTH EVERY DAY 90 capsule 3    rosuvastatin (CRESTOR) 20 MG tablet Take 1 tablet (20 mg total) by mouth every evening. 90 tablet 3    tamsulosin (FLOMAX) 0.4 mg Cap TAKE 1 CAPSULE(0.4 MG) BY MOUTH EVERY DAY 30 capsule 11    warfarin (COUMADIN) 4 MG tablet 6mg PO Sun, Tues, Thurs and 4mg PO all other days -- OR AS DIRECTED BY COUMADIN CLINIC      clotrimazole (LOTRIMIN) 1 % cream Apply topically once daily. for 14 days (Patient taking differently: Apply topically as needed.) 45 g 0    testosterone (ANDROGEL) 20.25 mg/1.25 gram (1.62 %) GlPm Apply 4 pumps to shoulders daily (Patient not taking: Reported on 10/11/2024) 2 each 5    triamcinolone acetonide 0.1% (KENALOG) 0.1 % cream APPLY TOPICALLY TO THE AFFECTED AREA TWICE DAILY (Patient not taking: Reported on 10/10/2024) 45 g 2     No current facility-administered medications for this visit.      Physical Exam:   /64   Pulse (!) 48   Temp 98.8 °F (37.1  °C) (Temporal)   Ht 6' (1.829 m)   Wt 95.5 kg (210 lb 8.6 oz)   SpO2 98%   BMI 28.55 kg/m²      ECOG Performance status: 0    Physical Exam  Vitals reviewed.   Constitutional:       General: He is not in acute distress.     Appearance: Normal appearance. He is not ill-appearing, toxic-appearing or diaphoretic.   HENT:      Head: Normocephalic and atraumatic.      Right Ear: External ear normal.      Left Ear: External ear normal.      Nose: Nose normal. No congestion.      Mouth/Throat:      Pharynx: Oropharynx is clear.   Eyes:      General: No scleral icterus.     Extraocular Movements: Extraocular movements intact.      Conjunctiva/sclera: Conjunctivae normal.      Pupils: Pupils are equal, round, and reactive to light.   Cardiovascular:      Rate and Rhythm: Normal rate and regular rhythm.   Pulmonary:      Effort: Pulmonary effort is normal. No respiratory distress.   Chest:      Comments: RCW port  Abdominal:      General: There is no distension.      Palpations: Abdomen is soft.      Tenderness: There is no abdominal tenderness.   Musculoskeletal:         General: No swelling.      Cervical back: Normal range of motion.   Lymphadenopathy:      Cervical: No cervical adenopathy.   Skin:     Coloration: Skin is not jaundiced.      Findings: No bruising, erythema or rash.   Neurological:      General: No focal deficit present.      Mental Status: He is alert and oriented to person, place, and time. Mental status is at baseline.      Cranial Nerves: No cranial nerve deficit.      Motor: No weakness.      Gait: Gait normal.   Psychiatric:         Mood and Affect: Mood normal.         Behavior: Behavior normal.         Thought Content: Thought content normal.         Judgment: Judgment normal.         Labs:   Recent Results (from the past 48 hours)   COMPREHENSIVE METABOLIC PANEL    Collection Time: 10/10/24  8:25 AM   Result Value Ref Range    Sodium 144 136 - 145 mmol/L    Potassium 4.2 3.5 - 5.1 mmol/L     Chloride 107 95 - 110 mmol/L    CO2 29 23 - 29 mmol/L    Glucose 133 (H) 70 - 110 mg/dL    BUN 12 8 - 23 mg/dL    Creatinine 1.1 0.5 - 1.4 mg/dL    Calcium 9.3 8.7 - 10.5 mg/dL    Total Protein 6.8 6.0 - 8.4 g/dL    Albumin 3.9 3.5 - 5.2 g/dL    Total Bilirubin 0.7 0.1 - 1.0 mg/dL    Alkaline Phosphatase 81 55 - 135 U/L    AST 22 10 - 40 U/L    ALT 24 10 - 44 U/L    eGFR >60.0 >60 mL/min/1.73 m^2    Anion Gap 8 8 - 16 mmol/L   CBC W/ AUTO DIFFERENTIAL    Collection Time: 10/10/24  8:25 AM   Result Value Ref Range    WBC 3.94 3.90 - 12.70 K/uL    RBC 4.30 (L) 4.60 - 6.20 M/uL    Hemoglobin 13.1 (L) 14.0 - 18.0 g/dL    Hematocrit 40.1 40.0 - 54.0 %    MCV 93 82 - 98 fL    MCH 30.5 27.0 - 31.0 pg    MCHC 32.7 32.0 - 36.0 g/dL    RDW 15.0 (H) 11.5 - 14.5 %    Platelets 111 (L) 150 - 450 K/uL    MPV 11.0 9.2 - 12.9 fL    Immature Granulocytes 0.3 0.0 - 0.5 %    Gran # (ANC) 2.7 1.8 - 7.7 K/uL    Immature Grans (Abs) 0.01 0.00 - 0.04 K/uL    Lymph # 0.6 (L) 1.0 - 4.8 K/uL    Mono # 0.4 0.3 - 1.0 K/uL    Eos # 0.2 0.0 - 0.5 K/uL    Baso # 0.03 0.00 - 0.20 K/uL    nRBC 0 0 /100 WBC    Gran % 68.5 38.0 - 73.0 %    Lymph % 16.2 (L) 18.0 - 48.0 %    Mono % 9.9 4.0 - 15.0 %    Eosinophil % 4.3 0.0 - 8.0 %    Basophil % 0.8 0.0 - 1.9 %    Differential Method Automated    Magnesium    Collection Time: 10/10/24  8:25 AM   Result Value Ref Range    Magnesium 2.2 1.6 - 2.6 mg/dL   PHOSPHORUS    Collection Time: 10/10/24  8:25 AM   Result Value Ref Range    Phosphorus 2.9 2.7 - 4.5 mg/dL   BILIRUBIN, DIRECT    Collection Time: 10/10/24  8:25 AM   Result Value Ref Range    Bilirubin, Direct 0.3 0.1 - 0.3 mg/dL   CORTISOL, RANDOM    Collection Time: 10/10/24  8:25 AM   Result Value Ref Range    Cortisol 10.40 ug/dL     Imaging:      10/10/24 - CT CAP:  Impression:     Postoperative change of esophagectomy with gastric pull-through.  No convincing evidence of metastatic disease in the chest, abdomen, or pelvis.     New nonspecific 0.2  cm nodule at the left lung base.  Previously noted 3 mm left apical nodule not evident.  Attention on follow-up.     Aortic valve calcification.     Focal thickening/luminal narrowing of the distal sigmoid colon, presumably peristalsis.  Colonoscopy October 26, 2023 demonstrated no abnormality per report.     Other findings as above.     There are no measurable lesions per RECIST criteria.       Path:   3/14/23:  Final Pathologic Diagnosis 1. LYMPH NODE, LEVEL 7, EXCISION:   One benign lymph node (0/1)   2. TOTAL THORACIC ESOPHAGECTOMY AND PROXIMAL STOMACH:   Adenocarcinoma, moderately differentiated, 3.0 cm   Margins are negative   Tumor invades adventitia   Extensive residual cancer with no evident tumor regression (poor or no   response, score 3)   Eleven benign lymph nodes (0/11)   3. RESIDUAL CONDUIT, EXCISION:   Negative for malignancy   SYNOPTIC REPORT   Procedure - Esophageal gastrectomy   Tumor site - GE Junction   Relationship of tumor to esophagogastric junction - Lesion is central at GE   junction   Tumor size - 3.0 x 3.1cm   Histologic type - Adenocarcinoma   Histologic grade - Moderately differentiated   Microscopic tumor extension - Tumor invades adventitia   Margins - All margins of resection are uninvolved, proximal, distal and   adventitial margins are negative   Treatment effect - Extensive residual cancer with no evident tumor regression   (poor or no response, score 3)   Lymphovascular invasion - Not identified   Pathologic staging - yp T3 N0 Mx   Lymph nodes examined - 12   Lymph nodes involved - 0   Additional pathologic findings - Intestinal metaplasia present   MMR-IHC has been performed on previous biopsy ([id number]) and shows all 4   antibodies intact      Assessment:       1. Esophageal adenocarcinoma    2. Esophageal dysphagia    3. Vocal cord paralysis    4. Hypertension associated with diabetes    5. Hyperlipidemia associated with type 2 diabetes mellitus    6. Type 2 diabetes  mellitus with stage 3 chronic kidney disease, without long-term current use of insulin, unspecified whether stage 3a or 3b CKD    7. Coronary artery disease involving native coronary artery of native heart without angina pectoris    8. Paroxysmal atrial fibrillation    9. HFrEF (heart failure with reduced ejection fraction)    10. Thrombocytopenia        Plan:     # Esophageal adenocarcinoma   Mr. Person is a pleasant 67 year old male who presents to our clinic for management of esophageal cancer. He initially presented with dysphagia, and further workup confirmed a stage II adenocarcinoma, pMMR. Tumor staged T3N0Mx by endosonographic criteria, JEVON. CT CAP on 12/14/22 confirmed no evidence of metastatic disease.    Previously conversation regarding his diagnosis and treatment options.  Recommended perioperative chemo/radiation per the CROSS trial. Discussed chemoradiation for ~5 weeks with 5 doses of weekly carboplatin and taxol administered. Plan to obtain restaging scans 4-5 weeks after radiation completed.     He was a candidate for a cooperative group trial assessing perioperative immunotherapy treatment. He consented for this study - ECOG-ACRIN [id number]: A Phase II/III Study of Dilcia-operative Nivolumab and Ipilimumab in Patients with Locoregional Esophageal and Gastroesophageal Junction Adenocarcinoma    Previously met with Dr. Santana Brown who agreed he was a surgical candidate.  Previously met with Dr. Javier Moulton who will be treating him with radiation.    Began cycle 1 carboplatin/paclitaxel/nivolumab on trial on 12/29/22.  Received cycle 4 of weekly chemotherapy on trial on 1/20/23.   We held chemotherapy for week 5 because of thrombocytopenia per protocol.    Completed radiation on 2/1/23.    Restaging PET/CT personally reviewed on 3/1/23 shows interval decreased FDG avidity in esophageal tumor, no new sites of disease.  CT CAP 3/2/23 shows stable esophageal thickening.    Underwent robotic  esophagectomy on 3/14/23 with Dr. Brown.  Pathology showed negative margins, ypT3 N0 tumor with 0 of 12 lymph nodes involved.  No treatment effect was noted on the tumor tissue.    Discussed that per the [id number] protocol will proceed with randomization to adjuvant nivolumab +/- ipilimumab. Patient randomized to receive adjuvant Nivolumab, started cycle 1 at 480 mg q4 weeks 5/1/23. Plan for twelve months per protocol.    Tolerating very well. Grade 1 pruritis.    Repeat imaging after cycle 6 shows DAVE.  Repeat imaging after cycle 13 (final cycle) shows DAVE.    Final cycle administered 4/1/24.    Underwent dilation with Dr. Brown on 5/31/23 with temporary improvement in dysphagia. Weight stable.  Still having some dysphagia so esophogram ordered which showed concern for pyloric stricture.   Underwent Botox injection into pylorus with Dr. Brown on 5/3/24.     PD-L1 CPS 30.    Doing well with no new concerns.   Will reach out with any new issues.     RTC in 3 months per protocol with imaging.    # Vocal cord paralysis  Post-op. S/p injection by ENT.  Stable. Last evaluated 9/11/23 by ENT with improvement.    Now essentially resolved.    # HTN, HLD, DM, CKD  Following with PCP Dr. Wilson and nephrologist Dr. Oconnell.   BP managed by Dr. Nick.  BP mildly elevated today. Asymptomatic. Taking losartan and HCTZ.  Cr stable.    # CAD, A fib, CHF  Following with cardiologist Dr. Nick & EP Dr. Phillip.   Was on Xarelto. Now on warfarin. Being monitored by coumadin clinic. Labs monitored on regular basis.   Continue medical management.     # Cytopenias  Chronic TCP and anemia.  No change.  Monitor.    Follow up: per research.    Patient is in agreement with the proposed treatment plan. All questions were answered to the patient's satisfaction. Pt knows to call clinic if anything is needed before the next clinic visit.    Patient discussed with collaborating physician, Dr. Medrano.    At least 30 minutes were spent today  on this encounter including face to face time with the patient, data gathering/interpretation and documentation.       Bernadette Patterson, MSN, APRN, ACCNS-AG  Hematology and Medical Oncology  Clinical Nurse Specialist to Dr. Medrano, Dr. Smith & Dr. Cornell Chart for Scheduling    Med Onc Chart Routing      Follow up with physician . Per research   Follow up with SAMUEL    Infusion scheduling note    Injection scheduling note    Labs    Imaging    Pharmacy appointment    Other referrals              Treatment Plan Information   [facility] [id number] ARM C ADJUVANT NIVOLUMAB STEP 2 Miki Medrano MD   Associated diagnosis: Esophageal adenocarcinoma Stage II ypT3, pN0, cM0, G2 noted on 12/8/2022   Line of treatment: Adjuvant  Treatment Goal: Curative     Upcoming Treatment Dates - [facility] [id number] ARM C ADJUVANT NIVOLUMAB STEP 2    4/29/2024       Chemotherapy       INV nivolumab 480 mg in INV sodium chloride 0.9 % 100 mL chemo infusion    Supportive Plan Information  IV FLUIDS AND ELECTROLYTES Miki Medrano MD   Associated Diagnosis: Esophageal adenocarcinoma Stage II ypT3, pN0, cM0, G2 noted on 12/8/2022   Line of treatment: Supportive Care   Treatment goal: Supportive     Upcoming Treatment Dates - IV FLUIDS AND ELECTROLYTES    No upcoming days in selected categories.    Therapy Plan Information  PORT FLUSH for Esophageal adenocarcinoma, noted on 12/8/2022  Flushes  heparin, porcine (PF) 100 unit/mL injection flush 500 Units  500 Units, Intravenous, Every visit  sodium chloride 0.9% flush 10 mL  10 mL, Intravenous, Every visit      No therapy plan of the specified type found.    No therapy plan of the specified type found.

## 2024-10-31 ENCOUNTER — PATIENT MESSAGE (OUTPATIENT)
Dept: CARDIOLOGY | Facility: CLINIC | Age: 69
End: 2024-10-31
Payer: MEDICARE

## 2024-11-05 ENCOUNTER — PATIENT MESSAGE (OUTPATIENT)
Dept: CARDIOLOGY | Facility: CLINIC | Age: 69
End: 2024-11-05

## 2024-11-05 ENCOUNTER — ANTI-COAG VISIT (OUTPATIENT)
Dept: CARDIOLOGY | Facility: CLINIC | Age: 69
End: 2024-11-05
Payer: MEDICARE

## 2024-11-05 ENCOUNTER — CLINICAL SUPPORT (OUTPATIENT)
Dept: REHABILITATION | Facility: HOSPITAL | Age: 69
End: 2024-11-05
Payer: MEDICARE

## 2024-11-05 ENCOUNTER — LAB VISIT (OUTPATIENT)
Dept: LAB | Facility: HOSPITAL | Age: 69
End: 2024-11-05
Payer: MEDICARE

## 2024-11-05 DIAGNOSIS — I48.0 PAROXYSMAL ATRIAL FIBRILLATION: Chronic | ICD-10-CM

## 2024-11-05 DIAGNOSIS — F43.29 STRESS AND ADJUSTMENT REACTION: ICD-10-CM

## 2024-11-05 DIAGNOSIS — R53.81 PHYSICAL DECONDITIONING: Primary | ICD-10-CM

## 2024-11-05 DIAGNOSIS — I48.0 PAROXYSMAL ATRIAL FIBRILLATION: Primary | Chronic | ICD-10-CM

## 2024-11-05 LAB
INR PPP: 4 (ref 0.8–1.2)
PROTHROMBIN TIME: 40.2 SEC (ref 9–12.5)

## 2024-11-05 PROCEDURE — 97112 NEUROMUSCULAR REEDUCATION: CPT

## 2024-11-05 PROCEDURE — 97110 THERAPEUTIC EXERCISES: CPT

## 2024-11-05 PROCEDURE — 36415 COLL VENOUS BLD VENIPUNCTURE: CPT | Performed by: INTERNAL MEDICINE

## 2024-11-05 PROCEDURE — 93793 ANTICOAG MGMT PT WARFARIN: CPT | Mod: ,,,

## 2024-11-05 PROCEDURE — 85610 PROTHROMBIN TIME: CPT | Performed by: INTERNAL MEDICINE

## 2024-11-05 PROCEDURE — 97535 SELF CARE MNGMENT TRAINING: CPT

## 2024-11-05 NOTE — PROGRESS NOTES
[facility]  Occupational Therapy Progress and Treatment Note - Therapeutic Yoga Progam    Date: 11/5/2024  Name: Lukasz Person  Clinic Number: [id number]    Therapy Diagnosis:       Physician: Betina Valdovinos, *    Physician Orders: Eval and Treat   Medical Diagnosis from Referral: Chronic low back pain [M54.50, G89.29], Poor posture [R29.3], Esophageal adenocarcinoma [C15.9]  Evaluation Date: 8/22/2023  Authorization Period Expiration: 12/31/24  Plan of Care Expiration: 05/22/24  Progress Note Due: 4/12/24  Visit # / Visits authorized: 18/20     Time in:  10:30 am  Time Out:   11:15 am  Total Billable Time: 45 minutes    SUBJECTIVE     Pt reports: We had 3 months of fun traveling but I am glad to be home.    He was compliant with home exercise program given last session.   Response to previous treatment: improved balance.  Functional change: improved balance and endurance.    Pain:  2/10 in left shoulder      OBJECTIVE     Objective Measures updated at progress report unless specified.    Patient Specific Functional Scale:           Activity 8/22/23  11/1/23 12/6/23   1/5/24 2/6/24  3/12/24 11/5/24    Poor balance 5    5  6      7     8     8     9   2.  Carrying 20 pounds or more 7     6  7       7      8      10 10   3.  Challenge with stairs 5     6  6      7     8      8   9   4.  Endurance  4      6  7      7      8      8   9   5.                 6.               SCORE 5.25    5.75    6.5     7.0    8.0      8.2   9.25      Total Score = Sum of activity scores / number of activities  Minimum Detectable Change (90% CII) for average score = 2 points  Minimum Detectable Change (90% CI) for single activity score = 3 points       Treatment     Lukasz received the treatments listed below:       Date 2/20/24 2/27/24 3/5/24 3/12/24 3/19/24 3/26/24 11/5/24   Therapeutic Yoga Exercises / Neuromuscular Re-education 30 minutes 30 min. 30 minutes 30 minutes  PN 45 minutes 30 minutes 30  minutes  POC, PN   Seated Yoga    On bolster: hamstring stretch with strap,  backbends, bound angle, cross legged, chair yoga:  Cat-Cow,forward bend, back bend, twist,  Seated hamstring stretch on chair, chair yoga:  Cat-Cow,forward bend, back bend, twist,  chair yoga:  Cat-Cow,forward bend, back bend, twist, neck ROM sequence,  Sitting in chair with back:  hamstring stretch with sheet, back bend, meditation using sweeping awareness from pelvic floor to top of head, chair yoga:  Cat-Cow,forward bend, back bend, twist,    Quadruped  Bird dog x 3,cat cow x 6,        Supine hamstring release with sheet, Modified boat pose with block and head lifted, 2 x 5, Twist x 2,( eagle and wide feet),  knees to chest, happy baby flow, knee to chest flow,  On bolster:  Modified boat pose with block and head lifted, 2 x 5, Twist x 2,( eagle and wide feet),  knees to chest, happy baby flow, knee to chest flow,  Modified boat pose with block and head lifted, 3 x 5, Twist x 2, knees to chest, happy baby flow, knee to chest flow Modified boat pose with block and head lifted, 3 x 5, Twist x 2, happy baby flow, knee to chest flow, bridge x 2, Modified boat pose with block and head lifted, 3 x 5, Twist x 2, happy baby flow, knee to chest flow, bridge x 2, hamstring stretch with sheet,   Modified boat pose with block and head lifted, 3 x 5, happy baby flow, knee to chest flow, bridge x 2, hamstring stretch with sheet,    Prone Sphinx/ sphinx plank x 3 Sphinx/ sphinx plank x 3  Sphinx/ sphinx plank x 3      standing chair pose/volcano x2, wide straddle with hands on chair, standing back ext with hands on sacrum and heels pulling together, warrior 2, chair pose/volcano x2, wide straddle with hands on chair, standing back ext with hands on sacrum and heels pulling together, warrior 2, chair pose x2, wide straddle with hands on chair, standing back ext with hands on sacrum and heels pulling together, warrior 2, chair pose x2, wide straddle with  hands on chair, standing back ext with hands on sacrum and heels pulling together, warrior 2, chair pose x2, wide straddle with hands on chair, standing back ext with hands on sacrum and heels pulling together, warrior 2, back ext with hands on sacrum and heels pulling together, warrior 2, triangle with chair, tree with knee at wall and without, bilateral shoulder AAROM using wall,  Down dog with chair, warrior 2, chair pose, tree pose with chair,   L UE ROM and strength   Tool assisted device left shoulder and upper arm, deeper tissue massage to bicep.                   Self-Care/Home Management  / Therapeutic Activities 15 minutes 15 min. 15 min.  15 minutes 30 minutes 15 minutes             Relaxation techniques DB,body scan, metta  DB,body scan, metta   Bramari breath, DB, body scan, Bramari breath, DB, body scan, meditation using sweeping awareness from pelvic floor to top of head, Bramari breath, DB, body scan,  DB, body scan,alternate nostril breathing   Restorative  Supine on treatment table with legs up wall using 2 bolsters, Supine on floor in bolster supported bridge and bound angle  Supine on mat with 2 bolsters under LE's. Fish with 2 blocks  Bridge and bound angle with bolsters   Activity Pacing                              Stress Management/Education  - physiology of yoga/meditation and immune health - physiology of yoga/meditation and immune health  physiology of yoga/meditation and immune health - physiology of yoga/meditation and immune health - physiology of yoga/meditation and immune health - physiology of yoga/meditation and immune health - physiology of yoga/meditation and immune health                 Patient Education and Home Exercises      Education provided:   - - physiology of yoga/meditation and immune health  - Progress towards goals     Written Home Exercises Provided: yes.  Exercises were reviewed and Lukasz was able to demonstrate them prior to the end of the session.  Lukasz  demonstrated good  understanding of the HEP provided. See EMR under Patient Instructions for exercises provided during therapy sessions.       Assessment      In today's session, Lukasz practiced we modified his HEP due to barium swallow test right before OT.  We focusing  on strengthening, stretching and relaxation techniques in sit and stand only.  He was taught new standing balance pose at wall and without wall.  He was taught new relaxation techniques including Samha mudra for neck and throat relaxation and ROM.   He required moderate verbal and neuromuscular cues.     Lukasz is progressing well towards his goals and patient prognosis is Good.    Progress Update: Lukasz is making good progress towards his goals.  His PSFS score increased from   8.2 to 9.25  indicating  increased functional ability.  He reports his balance and endurance are improving with yoga exercise. He is able to transfer from stand to floor without using a chair and 1 flight of stairs are not a challenge He continues to use prayer and breathing techniques to help with stress/immune health.   Patient will continue to benefit from skilled outpatient occupational therapy to address the deficits listed in the problem list on initial evaluation provide pt/family education and to maximize pt's level of independence in the home and community environment. Pt's spiritual, cultural and educational needs considered and pt agreeable to plan of care and goals.    Anticipated barriers to occupational therapy: none    Goals:  Short Term Goals: 6 weeks      Goal # Goal Status   1 Patient will demonstrate independence with diaphragmatic breathing in sit and supine. met   2 Pt. will identify resource for audio body scan to assist with stress management/immune health met   3 Patient will identify 2 new stress coping skills for stress management/immune health. met   4 Patient will identify activity pacing problems.  Patient will then implement new plan for  daily activity to increase endurance for ADL's. Progressing      Long Term Goals: 12 weeks      Goal # Goal Status   1 Patient will demonstrate independence with yoga Home Exercise Program to increase strength and endurance for ADL's. Progressing   2 Patient will demonstrate independence with relaxation techniques to manage stress for immune health. Progressing   3 Patient will verbalize good understanding of stress/immune health relationship.  met   4            PLAN     Plan of care Certification: 11/5/2024 to 5/22/24.    Outpatient Occupational Therapy 1 times weekly for 14 weeks to include the following interventions: Neuromuscular Re-ed, Patient Education, Self Care, Therapeutic Activities, and Therapeutic Exercise.     Joanna Kimball, OT

## 2024-11-05 NOTE — PROGRESS NOTES
[facility]    11/05/2024    Lukasz Person (69 y.o.) is followed by the [facility].      Assessment/Plan:    Lukasz Person presents with a supratherapeutic INR. Goal INR: 2.0-3.0    Lab Results   Component Value Date    INR 4.0 (H) 11/05/2024    INR 2.5 09/24/2024    INR 3.0 (H) 08/27/2024       Assessment of patient findings per MA/LPN and chart review:   The following significant findings were found:   None    Recommendation for patient's warfarin regimen:   Due to supratherapeutic INR, patient was instructed to SKIP their next 1 warfarin doses.    Recommended repeat INR in 1 week      Melissa Kinsey, PharmD, BCPS  Clinical Pharmacist - [facility]  Preferred Contact: Secure Messaging or In Basket Message

## 2024-11-06 NOTE — PLAN OF CARE
Outpatient Therapy Updated Plan of Care     Visit Date: 11/5/2024  Name: Lukasz Person  Clinic Number: [id number]    Therapy Diagnosis:   Encounter Diagnoses   Name Primary?    Physical deconditioning Yes    Stress and adjustment reaction      Physician: Betina Valdovinos, *    Physician Orders:Physician Orders: Eval and Treat   Medical Diagnosis from Referral: Chronic low back pain [M54.50, G89.29], Poor posture [R29.3], Esophageal adenocarcinoma [C15.9]  Evaluation Date: 8/22/2023  Authorization Period Expiration: 12/31/24  Plan of Care Expiration: 02/5/25  Progress Note Due: 12/5/24  Visit # / Visits authorized: 18/20      Total Visits Received: 18    Current Certification Period:  1/1/24 to 12/31/24  Precautions:  standard and cancer      Subjective     Update: Pt reports: We had 3 months of fun traveling but I am glad to be home.    He was compliant with home exercise program given last session.   Response to previous treatment: improved balance.  Functional change: improved balance and endurance.     Pain:  2/10 in left shoulder    Objective     Patient Specific Functional Scale:             Activity 8/22/23  11/1/23 12/6/23   1/5/24 2/6/24  3/12/24 11/5/24    Poor balance 5    5  6      7     8     8     9   2.  Carrying 20 pounds or more 7     6  7       7      8      10 10   3.  Challenge with stairs 5     6  6      7     8      8   9   4.  Endurance  4      6  7      7      8      8   9   5.                   6.                 SCORE 5.25    5.75    6.5     7.0    8.0      8.2   9.25      Total Score = Sum of activity scores / number of activities  Minimum Detectable Change (90% CII) for average score = 2 points  Minimum Detectable Change (90% CI) for single activity score = 3 points        Assessment     Update:    In today's session, Lukasz practiced we modified his HEP due to barium swallow test right before OT.  We focusing  on strengthening, stretching and relaxation techniques in sit and stand  only.  He was taught new standing balance pose at wall and without wall.  He was taught new relaxation techniques including Samha mudra for neck and throat relaxation and ROM.   He required moderate verbal and neuromuscular cues.     Lukasz is progressing well towards his goals and patient prognosis is Good.     Progress Update: Lukasz is making good progress towards his goals.  His PSFS score increased from   8.2 to 9.25  indicating  increased functional ability.  He reports his balance and endurance are improving with yoga exercise. He is able to transfer from stand to floor without using a chair and 1 flight of stairs are not a challenge He continues to use prayer and breathing techniques to help with stress/immune health.   Patient will continue to benefit from skilled outpatient occupational therapy to address the deficits listed in the problem list on initial evaluation provide pt/family education and to maximize pt's level of independence in the home and community environment. Pt's spiritual, cultural and educational needs considered and pt agreeable to plan of care and goals.    GOALS    Plan     Updated Certification Period: 11/5/2024 to 2/5/25  Recommended Treatment Plan: 1 times per week for 2 visits.: Self Care, Therapeutic Activities, and Therapeutic Exercise,neuromuscular ed.    Joanna Kimball OT  11/5/2024      I CERTIFY THE NEED FOR THESE SERVICES FURNISHED UNDER THIS PLAN OF TREATMENT AND WHILE UNDER MY CARE    Physician's comments:        Physician's Signature: ___________________________________________________

## 2024-11-07 DIAGNOSIS — E78.2 MIXED HYPERLIPIDEMIA: ICD-10-CM

## 2024-11-08 ENCOUNTER — PATIENT MESSAGE (OUTPATIENT)
Dept: UROLOGY | Facility: CLINIC | Age: 69
End: 2024-11-08
Payer: MEDICARE

## 2024-11-08 ENCOUNTER — PATIENT MESSAGE (OUTPATIENT)
Dept: OPTOMETRY | Facility: CLINIC | Age: 69
End: 2024-11-08
Payer: MEDICARE

## 2024-11-08 RX ORDER — ROSUVASTATIN CALCIUM 20 MG/1
20 TABLET, COATED ORAL NIGHTLY
Qty: 90 TABLET | Refills: 3 | Status: SHIPPED | OUTPATIENT
Start: 2024-11-08

## 2024-11-09 ENCOUNTER — PATIENT MESSAGE (OUTPATIENT)
Dept: CARDIOLOGY | Facility: CLINIC | Age: 69
End: 2024-11-09
Payer: MEDICARE

## 2024-11-20 ENCOUNTER — TELEPHONE (OUTPATIENT)
Dept: OPTOMETRY | Facility: CLINIC | Age: 69
End: 2024-11-20
Payer: MEDICARE

## 2024-11-21 ENCOUNTER — LAB VISIT (OUTPATIENT)
Dept: LAB | Facility: HOSPITAL | Age: 69
End: 2024-11-21
Attending: INTERNAL MEDICINE
Payer: MEDICARE

## 2024-11-21 ENCOUNTER — ANTI-COAG VISIT (OUTPATIENT)
Dept: CARDIOLOGY | Facility: CLINIC | Age: 69
End: 2024-11-21
Payer: MEDICARE

## 2024-11-21 ENCOUNTER — PATIENT MESSAGE (OUTPATIENT)
Dept: CARDIOLOGY | Facility: CLINIC | Age: 69
End: 2024-11-21

## 2024-11-21 DIAGNOSIS — I48.0 PAROXYSMAL ATRIAL FIBRILLATION: Primary | Chronic | ICD-10-CM

## 2024-11-21 DIAGNOSIS — I48.0 PAROXYSMAL ATRIAL FIBRILLATION: Chronic | ICD-10-CM

## 2024-11-21 LAB
INR PPP: 2.7 (ref 0.8–1.2)
PROTHROMBIN TIME: 28.1 SEC (ref 9–12.5)

## 2024-11-21 PROCEDURE — 36415 COLL VENOUS BLD VENIPUNCTURE: CPT | Performed by: INTERNAL MEDICINE

## 2024-11-21 PROCEDURE — 85610 PROTHROMBIN TIME: CPT | Performed by: INTERNAL MEDICINE

## 2024-11-21 PROCEDURE — 93793 ANTICOAG MGMT PT WARFARIN: CPT | Mod: ,,,

## 2024-11-21 NOTE — PROGRESS NOTES
[facility]    11/21/2024    Lukasz Person (69 y.o.) is followed by the [facility].      Assessment/Plan:    Lukasz Person presents with a therapeutic INR. Goal INR: 2.0-3.0    Lab Results   Component Value Date    INR 2.7 (H) 11/21/2024    INR 4.0 (H) 11/05/2024    INR 2.5 09/24/2024       Assessment of patient findings per MA/LPN and chart review:   The following significant findings were found:   None    Recommendation for patient's warfarin regimen:   No change was made to warfarin therapy during this visit and patient has been instructed to continue their current warfarin regimen.    Recommended repeat INR in 4 weeks      Melissa Kinsey, PharmD, BCPS  Clinical Pharmacist - [facility]  Preferred Contact: Secure Messaging or In Basket Message

## 2024-11-22 ENCOUNTER — HOSPITAL ENCOUNTER (OUTPATIENT)
Dept: RADIOLOGY | Facility: HOSPITAL | Age: 69
Discharge: HOME | End: 2024-11-22
Attending: UROLOGY
Payer: MEDICARE

## 2024-11-22 DIAGNOSIS — N20.0 NEPHROLITHIASIS: ICD-10-CM

## 2024-11-22 PROCEDURE — 76770 US EXAM ABDO BACK WALL COMP: CPT | Mod: 26,,, | Performed by: RADIOLOGY

## 2024-11-22 PROCEDURE — 76770 US EXAM ABDO BACK WALL COMP: CPT | Mod: TC

## 2024-11-22 PROCEDURE — 74018 RADEX ABDOMEN 1 VIEW: CPT | Mod: TC

## 2024-11-22 PROCEDURE — 74018 RADEX ABDOMEN 1 VIEW: CPT | Mod: 26,,, | Performed by: RADIOLOGY

## 2024-12-01 ENCOUNTER — PATIENT MESSAGE (OUTPATIENT)
Dept: UROLOGY | Facility: CLINIC | Age: 69
End: 2024-12-01
Payer: MEDICARE

## 2024-12-02 ENCOUNTER — TELEPHONE (OUTPATIENT)
Dept: PODIATRY | Facility: CLINIC | Age: 69
End: 2024-12-02
Payer: MEDICARE

## 2024-12-02 NOTE — TELEPHONE ENCOUNTER
Spoke with patient to confirm his appointment on 12/03/2024 with Dr. Lopez(Podiatry), patient has verbally confirmed his appt.

## 2024-12-03 ENCOUNTER — OFFICE VISIT (OUTPATIENT)
Dept: PODIATRY | Facility: CLINIC | Age: 69
End: 2024-12-03
Payer: MEDICARE

## 2024-12-03 VITALS
WEIGHT: 215.63 LBS | BODY MASS INDEX: 29.21 KG/M2 | SYSTOLIC BLOOD PRESSURE: 114 MMHG | HEIGHT: 72 IN | HEART RATE: 52 BPM | DIASTOLIC BLOOD PRESSURE: 50 MMHG

## 2024-12-03 DIAGNOSIS — E11.22 TYPE 2 DIABETES MELLITUS WITH STAGE 3A CHRONIC KIDNEY DISEASE, WITHOUT LONG-TERM CURRENT USE OF INSULIN: ICD-10-CM

## 2024-12-03 DIAGNOSIS — B35.1 ONYCHOMYCOSIS DUE TO DERMATOPHYTE: ICD-10-CM

## 2024-12-03 DIAGNOSIS — D49.9 IMMUNODEFICIENCY SECONDARY TO NEOPLASM: ICD-10-CM

## 2024-12-03 DIAGNOSIS — E11.42 DIABETIC POLYNEUROPATHY ASSOCIATED WITH TYPE 2 DIABETES MELLITUS: Primary | ICD-10-CM

## 2024-12-03 DIAGNOSIS — D84.81 IMMUNODEFICIENCY SECONDARY TO NEOPLASM: ICD-10-CM

## 2024-12-03 DIAGNOSIS — N18.31 TYPE 2 DIABETES MELLITUS WITH STAGE 3A CHRONIC KIDNEY DISEASE, WITHOUT LONG-TERM CURRENT USE OF INSULIN: ICD-10-CM

## 2024-12-03 PROCEDURE — 99999 PR PBB SHADOW E&M-EST. PATIENT-LVL III: CPT | Mod: PBBFAC,,, | Performed by: PODIATRIST

## 2024-12-03 PROCEDURE — 11721 DEBRIDE NAIL 6 OR MORE: CPT | Mod: PBBFAC,PN | Performed by: PODIATRIST

## 2024-12-03 PROCEDURE — 99213 OFFICE O/P EST LOW 20 MIN: CPT | Mod: PBBFAC,PN | Performed by: PODIATRIST

## 2024-12-03 NOTE — PROGRESS NOTES
Subjective:      Patient ID: Lukasz Person is a 69 y.o. male.    Chief Complaint:   Diabetes Mellitus (PCP- 10/10/2024/Kirk Wilson MD) and Nail Care    Lukasz is a 69 y.o. male who returns to the clinic or evaluation and treatment of diabetic feet. Lukasz has a past medical history of Anticoagulant long-term use, Cancer, Diabetes mellitus, Hyperlipidemia, Hypertension, Kidney stones, and Sleep apnea.      Pt relates his feet are doing ok. + dry      PCP: Kirk Wilson MD    Date Last Seen by PCP: 10/10/24    Current shoe gear: Extra depth shoes      Hemoglobin A1C   Date Value Ref Range Status   10/11/2024 6.8 (H) 4.0 - 5.6 % Final     Comment:     ADA Screening Guidelines:  5.7-6.4%  Consistent with prediabetes  >or=6.5%  Consistent with diabetes    High levels of fetal hemoglobin interfere with the HbA1C  assay. Heterozygous hemoglobin variants (HbS, HgC, etc)do  not significantly interfere with this assay.   However, presence of multiple variants may affect accuracy.     04/01/2024 6.8 (H) 4.0 - 5.6 % Final     Comment:     ADA Screening Guidelines:  5.7-6.4%  Consistent with prediabetes  >or=6.5%  Consistent with diabetes    High levels of fetal hemoglobin interfere with the HbA1C  assay. Heterozygous hemoglobin variants (HbS, HgC, etc)do  not significantly interfere with this assay.   However, presence of multiple variants may affect accuracy.     01/08/2024 6.2 (H) 4.0 - 5.6 % Final     Comment:     ADA Screening Guidelines:  5.7-6.4%  Consistent with prediabetes  >or=6.5%  Consistent with diabetes    High levels of fetal hemoglobin interfere with the HbA1C  assay. Heterozygous hemoglobin variants (HbS, HgC, etc)do  not significantly interfere with this assay.   However, presence of multiple variants may affect accuracy.            Past Medical History:   Diagnosis Date    Anticoagulant long-term use     Cancer     Diabetes mellitus     Onset late 50s/early 60s    Hyperlipidemia     Hypertension     Onset late  50s/early 60s    Kidney stones     Sleep apnea     since 2006     Past Surgical History:   Procedure Laterality Date    COLONOSCOPY N/A 10/26/2023    Procedure: COLONOSCOPY;  Surgeon: Noah Duong MD;  Location: [facility] (4TH FLR);  Service: Endoscopy;  Laterality: N/A;  instructions sent to patient portal. [facility]  referral: Dr. Wilson  Pt. on Xarelto--tb-ok to hold see te 8/15/23 dr vu  10/13-precall complete-MS  10/19 pt rescheduled, Xarelto hold, PEG, portal -ml  10/24-precall complete-KPvt    CORONARY ANGIOGRAPHY N/A 9/30/2020    Procedure: ANGIOGRAM, CORONARY ARTERY;  Surgeon: John West MD;  Location: [facility] CATH LAB;  Service: Cardiology;  Laterality: N/A;    CYSTOSCOPY N/A 2/17/2022    Procedure: CYSTOSCOPY;  Surgeon: Lukasz Hughes MD;  Location: [facility] OR 1ST FLR;  Service: Urology;  Laterality: N/A;    CYSTOSCOPY W/ URETERAL STENT PLACEMENT N/A 2/25/2022    Procedure: CYSTOSCOPY, WITH URETERAL STENT INSERTION;  Surgeon: Lukasz Hughes MD;  Location: [facility] OR 1ST FLR;  Service: Urology;  Laterality: N/A;    ENDOSCOPIC ULTRASOUND OF UPPER GASTROINTESTINAL TRACT N/A 12/7/2022    Procedure: ULTRASOUND, UPPER GI TRACT, ENDOSCOPIC;  Surgeon: Darian Main MD;  Location: [facility];  Service: Endoscopy;  Laterality: N/A;  Approval to hold Xarelto rec'd from Dr. Vu (see t/e 12/5/22)-DS    ESOPHAGOGASTRODUODENOSCOPY N/A 11/17/2022    Procedure: EGD (ESOPHAGOGASTRODUODENOSCOPY);  Surgeon: Brody Gonzales MD;  Location: [facility] (2ND FLR);  Service: Endoscopy;  Laterality: N/A;  inst via email-ok to hold Xarelto x 2 days-MS    ESOPHAGOGASTRODUODENOSCOPY N/A 5/31/2023    Procedure: EGD (ESOPHAGOGASTRODUODENOSCOPY) WITH DILATION;  Surgeon: Santana Brown Jr., MD;  Location: [facility] OR 2ND FLR;  Service: General;  Laterality: N/A;    ESOPHAGOGASTRODUODENOSCOPY N/A 5/3/2024    Procedure: EGD (ESOPHAGOGASTRODUODENOSCOPY) with botox injection;  Surgeon: Santana Brown Jr., MD;  Location:  [facility] OR 2ND FLR;  Service: General;  Laterality: N/A;    INJECTION OF BOTULINUM TOXIN TYPE A  5/3/2024    Procedure: INJECTION, BOTULINUM TOXIN, TYPE A;  Surgeon: Santana Brown Jr., MD;  Location: [facility] OR [facility] FLR;  Service: General;;    LASER LITHOTRIPSY Left 2/25/2022    Procedure: LITHOTRIPSY, USING LASER;  Surgeon: Lukasz Hughes MD;  [facility];  Service: Urology;  Laterality: Left;    LEFT HEART CATHETERIZATION Left 9/30/2020    Procedure: Left heart cath;  Surgeon: John West MD;  Location: [facility] CATH LAB;  Service: Cardiology;  Laterality: Left;    LITHOTRIPSY      PARATHYROIDECTOMY  1/1/2-107    PYELOSCOPY Left 2/25/2022    Procedure: PYELOSCOPY;  Surgeon: Lukasz Hughes MD;  Location: [address];  Service: Urology;  Laterality: Left;    ROBOT-ASSISTED SURGICAL REMOVAL OF ESOPHAGUS USING DA CARLOS XI N/A 3/14/2023    Procedure: XI ROBOTIC ESOPHAGECTOMY;  Surgeon: Santana Brown Jr., MD;  [facility];  Service: General;  Laterality: N/A;  Abdomen, Chest and Neck    ROBOTIC JEJUNOSTOMY N/A 3/14/2023    Procedure: ROBOTIC JEJUNOSTOMY TUBE INSERTION;  Surgeon: Santana Brown Jr., MD;  [facility];  Service: General;  Laterality: N/A;    TRANSESOPHAGEAL ECHOCARDIOGRAPHY N/A 4/7/2022    Procedure: ECHOCARDIOGRAM, TRANSESOPHAGEAL;  Surgeon: [facility];  Location: [facility] EP LAB;  Service: Cardiology;  Laterality: N/A;    TREATMENT OF CARDIAC ARRHYTHMIA N/A 4/7/2022    Procedure: Cardioversion or Defibrillation;  Surgeon: Iris Fisher NP;  Location: [facility] EP LAB;  Service: Cardiology;  Laterality: N/A;  afib, dccv, artie, anes, EH, 3prep    URETEROSCOPIC REMOVAL OF URETERIC CALCULUS Left 2/25/2022    Procedure: REMOVAL, CALCULUS, URETER, URETEROSCOPIC;  Surgeon: Lukasz Hughes MD;  [address];  Service: Urology;  Laterality: Left;    URETEROSCOPY Left 2/25/2022    Procedure: URETEROSCOPY;  Surgeon: Lukasz HARRIS  MD Saul;  Location: [facility] OR [address] [place] [address];  Service: Urology;  Laterality: Left;    VOCAL CORD INJECTION Left 3/17/2023    Procedure: INJECTION, VOCAL CORD, LARYNGOSCOPIC;  Surgeon: Gm Altman MD;  Location: [facility] OR [address] [place] [address];  Service: ENT;  Laterality: Left;     Current Outpatient Medications on File Prior to Visit   Medication Sig Dispense Refill    allopurinoL (ZYLOPRIM) 100 MG tablet TAKE 1 TABLET BY MOUTH EVERY DAY 90 tablet 3    blood sugar diagnostic (ACCU-CHEK ANTONELLA PLUS TEST STRP) Strp Use to test CBG four times daily E11.65 400 strip 3    blood-glucose meter kit Checks blood sugars 1x/daily. 1 each 12    hydroCHLOROthiazide (HYDRODIURIL) 25 MG tablet TAKE 1 TABLET(25 MG) BY MOUTH EVERY DAY 30 tablet 11    lancets (ACCU-CHEK SOFTCLIX LANCETS) Misc USE 1 EVERY DAY OR AS DIRECTED 100 each 3    losartan (COZAAR) 25 MG tablet Take 1 tablet (25 mg total) by mouth 2 (two) times a day. 180 tablet 3    metoprolol succinate (TOPROL-XL) 25 MG 24 hr tablet Take 0.5 tablets (12.5 mg total) by mouth once daily. 45 tablet 2    nitroGLYCERIN (NITROSTAT) 0.4 MG SL tablet Place 1 tablet (0.4 mg total) under the tongue every 5 (five) minutes as needed for Chest pain. If you need a third tablet, call 911 100 tablet 3    omeprazole (PRILOSEC) 40 MG capsule TAKE 1 CAPSULE(40 MG) BY MOUTH EVERY DAY 90 capsule 3    rosuvastatin (CRESTOR) 20 MG tablet TAKE 1 TABLET(20 MG) BY MOUTH EVERY EVENING 90 tablet 3    tamsulosin (FLOMAX) 0.4 mg Cap TAKE 1 CAPSULE(0.4 MG) BY MOUTH EVERY DAY 30 capsule 11    warfarin (COUMADIN) 4 MG tablet 6mg PO Sun, Tues, Thurs and 4mg PO all other days -- OR AS DIRECTED BY COUMADIN CLINIC      clotrimazole (LOTRIMIN) 1 % cream Apply topically once daily. for 14 days (Patient taking differently: Apply topically as needed.) 45 g 0    testosterone (ANDROGEL) 20.25 mg/1.25 gram (1.62 %) GlPm Apply 4 pumps to shoulders daily (Patient not taking: Reported on 10/10/2024) 2 each 5    triamcinolone acetonide  0.1% (KENALOG) 0.1 % cream APPLY TOPICALLY TO THE AFFECTED AREA TWICE DAILY (Patient not taking: Reported on 10/10/2024) 45 g 2     No current facility-administered medications on file prior to visit.     Review of patient's allergies indicates:  No Known Allergies    Review of Systems   Constitutional: Negative for chills, decreased appetite, fever, malaise/fatigue, night sweats, weight gain and weight loss.   Cardiovascular:  Negative for chest pain, claudication, dyspnea on exertion, leg swelling, palpitations and syncope.   Respiratory:  Negative for cough and shortness of breath.    Endocrine: Negative for cold intolerance and heat intolerance.   Hematologic/Lymphatic: Negative for bleeding problem. Does not bruise/bleed easily.   Skin:  Positive for dry skin and nail changes. Negative for color change, flushing, itching, poor wound healing, rash, skin cancer, suspicious lesions and unusual hair distribution.   Musculoskeletal:  Positive for arthritis and stiffness. Negative for back pain, falls, gout, joint pain, joint swelling, muscle cramps, muscle weakness, myalgias and neck pain.   Gastrointestinal:  Negative for diarrhea, nausea and vomiting.   Neurological:  Positive for numbness and paresthesias. Negative for dizziness, focal weakness, light-headedness, tremors, vertigo and weakness.   Psychiatric/Behavioral:  Negative for altered mental status and depression. The patient does not have insomnia.    Allergic/Immunologic: Negative.            Objective:       Vitals:    12/03/24 0921   BP: (!) 114/50   Pulse: (!) 52   Weight: 97.8 kg (215 lb 9.8 oz)   Height: 6' (1.829 m)   PainSc: 0-No pain   97.8 kg (215 lb 9.8 oz)     Physical Exam  Vitals reviewed.   Constitutional:       General: He is not in acute distress.     Appearance: He is well-developed. He is not ill-appearing, toxic-appearing or diaphoretic.      Comments: Proper supportive shoegear   Cardiovascular:      Pulses:           Dorsalis pedis  pulses are 2+ on the right side and 2+ on the left side.        Posterior tibial pulses are 1+ on the right side and 1+ on the left side.   Musculoskeletal:         General: No swelling or tenderness.      Right lower leg: No tenderness. No edema.      Left lower leg: No tenderness. No edema.      Right ankle: Normal.      Right Achilles Tendon: Normal.      Left ankle: Normal.      Left Achilles Tendon: Normal.      Right foot: Decreased range of motion. Deformity, bunion and prominent metatarsal heads present. No tenderness or bony tenderness.      Left foot: Decreased range of motion. Deformity, bunion and prominent metatarsal heads present. No tenderness or bony tenderness.      Comments:  Flexible pes planus foot type w/ medial arch collapse and mild gastroc equinus       Reducible extensor and flexor contractures at the MTPJ and PIPJ of toes 2-5, bilat.          Feet:      Right foot:      Protective Sensation: 10 sites tested.  6 sites sensed.      Skin integrity: Dry skin present. No ulcer, blister, skin breakdown, erythema, warmth, callus or fissure.      Toenail Condition: Right toenails are abnormally thick and long. Fungal disease present.     Left foot:      Protective Sensation: 10 sites tested.  6 sites sensed.      Skin integrity: Dry skin present. No ulcer, blister, skin breakdown, erythema, warmth, callus or fissure.      Toenail Condition: Left toenails are abnormally thick and long. Fungal disease present.     Comments:   No open lesions    SWM decreased to digits and forefoot    Nails 1-10 thickened elongated discolored      Right ankle plaque          + peeling skin     Skin:     General: Skin is warm and dry.      Capillary Refill: Capillary refill takes 2 to 3 seconds.      Coloration: Skin is not pale.      Findings: No erythema or rash.      Nails: There is no clubbing.   Neurological:      Mental Status: He is alert and oriented to person, place, and time.      Sensory: Sensory deficit  present.      Gait: Gait abnormal.   Psychiatric:         Attention and Perception: Attention normal.         Mood and Affect: Mood normal.         Speech: Speech normal.         Behavior: Behavior normal.         Thought Content: Thought content normal.         Cognition and Memory: Cognition normal.         Judgment: Judgment normal.               Assessment:       Encounter Diagnoses   Name Primary?    Diabetic polyneuropathy associated with type 2 diabetes mellitus Yes    Onychomycosis due to dermatophyte     Immunodeficiency secondary to neoplasm     Type 2 diabetes mellitus with stage 3a chronic kidney disease, without long-term current use of insulin                      Plan:       Lukasz was seen today for diabetes mellitus and nail care.    Diagnoses and all orders for this visit:    Diabetic polyneuropathy associated with type 2 diabetes mellitus    Onychomycosis due to dermatophyte    Immunodeficiency secondary to neoplasm    Type 2 diabetes mellitus with stage 3a chronic kidney disease, without long-term current use of insulin                  I counseled the patient on his conditions, their implications and medical management.     Feet doing well     - Shoe inspection. Diabetic Foot Education. Patient reminded of the importance of good nutrition and blood sugar control to help prevent podiatric complications of diabetes. Patient instructed on proper foot hygeine. We discussed wearing proper shoe gear, daily foot inspections, never walking without protective shoe gear, never putting sharp instruments to feet      With patient's permission, the toenails mentioned above were aggressively reduced and debrided using a nail nipper, removing all offending nail and debris.      Calamine ointment /mepilex border   F/u 3 months sooner if need

## 2024-12-11 ENCOUNTER — PATIENT MESSAGE (OUTPATIENT)
Dept: HEMATOLOGY/ONCOLOGY | Facility: CLINIC | Age: 69
End: 2024-12-11
Payer: MEDICARE

## 2024-12-11 DIAGNOSIS — Z00.6 CLINICAL TRIAL PARTICIPANT: ICD-10-CM

## 2024-12-11 DIAGNOSIS — C15.9 ESOPHAGEAL ADENOCARCINOMA: Primary | ICD-10-CM

## 2024-12-19 ENCOUNTER — ANTI-COAG VISIT (OUTPATIENT)
Dept: CARDIOLOGY | Facility: CLINIC | Age: 69
End: 2024-12-19
Payer: MEDICARE

## 2024-12-19 ENCOUNTER — PATIENT MESSAGE (OUTPATIENT)
Dept: CARDIOLOGY | Facility: CLINIC | Age: 69
End: 2024-12-19

## 2024-12-19 ENCOUNTER — LAB VISIT (OUTPATIENT)
Dept: LAB | Facility: HOSPITAL | Age: 69
End: 2024-12-19
Attending: INTERNAL MEDICINE
Payer: MEDICARE

## 2024-12-19 DIAGNOSIS — I48.0 PAROXYSMAL ATRIAL FIBRILLATION: Primary | Chronic | ICD-10-CM

## 2024-12-19 DIAGNOSIS — I48.0 PAROXYSMAL ATRIAL FIBRILLATION: Chronic | ICD-10-CM

## 2024-12-19 LAB
INR PPP: 3 (ref 0.8–1.2)
PROTHROMBIN TIME: 30.5 SEC (ref 9–12.5)

## 2024-12-19 PROCEDURE — 36415 COLL VENOUS BLD VENIPUNCTURE: CPT | Performed by: INTERNAL MEDICINE

## 2024-12-19 PROCEDURE — 93793 ANTICOAG MGMT PT WARFARIN: CPT | Mod: ,,,

## 2024-12-19 PROCEDURE — 85610 PROTHROMBIN TIME: CPT | Performed by: INTERNAL MEDICINE

## 2024-12-19 NOTE — PROGRESS NOTES
[facility]    12/19/2024    Lukasz Person (69 y.o.) is followed by the [facility].      Assessment/Plan:    Lukasz Person presents with a therapeutic INR. Goal INR: 2.0-3.0    Lab Results   Component Value Date    INR 3.0 (H) 12/19/2024    INR 2.7 (H) 11/21/2024    INR 4.0 (H) 11/05/2024       Assessment of patient findings per MA/LPN and chart review:   The following significant findings were found:   None     Recommendation for patient's warfarin regimen:   No change was made to warfarin therapy during this visit and patient has been instructed to continue their current warfarin regimen.    Recommended repeat INR in 5 weeks      Melissa Kinsey, PharmD, BCPS  Clinical Pharmacist - [facility]  Preferred Contact: Secure Messaging or In Basket Message

## 2024-12-20 RX ORDER — OMEPRAZOLE 40 MG/1
40 CAPSULE, DELAYED RELEASE ORAL
Qty: 90 CAPSULE | Refills: 3 | Status: SHIPPED | OUTPATIENT
Start: 2024-12-20

## 2024-12-20 NOTE — TELEPHONE ENCOUNTER
Provider Staff:  Action required for this patient    Requires labs      Please see care gap opportunities below in Care Due Message.    Thanks!  [facility]     Appointments      Date Provider   Last Visit   10/10/2024 Kirk Wilson MD   Next Visit   1/22/2025 Kirk Wilson MD     Refill Decision Note   Lukasz Longonaya  is requesting a refill authorization.    Brief Assessment and Rationale for Refill:   Approve       Medication Therapy Plan:         Comments:     Note composed:5:11 PM 12/20/2024

## 2024-12-20 NOTE — TELEPHONE ENCOUNTER
Care Due:                  Date            Visit Type   Department     Provider  --------------------------------------------------------------------------------                                EP -                              PRIMARY      [facility] INTERNAL  Last Visit: 10-      CARE ([facility])   CAREY Wilson                              EP -                              PRIMARY      [facility] INTERNAL  Next Visit: 01-      CARE ([facility])   MEDICINE       Kirk Wilson                                                            Last  Test          Frequency    Reason                     Performed    Due Date  --------------------------------------------------------------------------------    Uric Acid...  12 months..  allopurinoL..............  07- 07-    [facility] Embedded Care Due Messages. Reference number: [id number].   12/20/2024 12:13:19 PM CST

## 2024-12-30 NOTE — PROGRESS NOTES
Subjective:      Patient ID: Lukasz Person is a 69 y.o. male.    Chief Complaint: f/u kidney stones    Patient is a 69 y.o. male who is established to our clinic and was initially referred by their PCP, Dr. Wilson for evaluation of kidney stones.     HPI  Patient underwent staged ureteroscopy for left distal ureteral stone and renal stone on 2/17/22 and 2/25/22.  Complicated by pyelonephritis post-op.  Now doing well.  Here to review imaging.   Ultrasound was independently reviewed today and reveals a small, 3mm non-obstructing right renal stone, no hydronephrosis.   KUB was independently reviewed today and reveals punctate right renal stone.     Of note, patient has a h/o hyperparathyroidism s/p parathyroidectomy approximately 10 years ago---in [place].     Returns today to review surveillance imaging for kidney stones.  Denies flank/abdominal pain.      CT scan of the c/a/p from 10/10/24 was independently reviewed today and reveals 2 punctate left and 1 punctate right renal stone. No hydronephrosis.  Ultrasound of the kidneys from 11/22/24 was independently reviewed today and reveals non-obstructing left renal stone, no hydronephrosis.  KUB from 11/22/24 was independently reviewed today and reveals 2.5mm left renal stone.           Review of Systems  All other systems reviewed and negative except pertinent positives noted in HPI.    Objective:     Physical Exam  Constitutional:       General: He is not in acute distress.     Appearance: He is well-developed.   HENT:      Head: Normocephalic and atraumatic.   Eyes:      General: No scleral icterus.  Neck:      Trachea: No tracheal deviation.   Pulmonary:      Effort: Pulmonary effort is normal. No respiratory distress.   Neurological:      Mental Status: He is alert and oriented to person, place, and time.   Psychiatric:         Behavior: Behavior normal.         Thought Content: Thought content normal.         Judgment: Judgment normal.       Assessment:     1. Kidney  stones    2. Renal mass    3. BPH with obstruction/lower urinary tract symptoms    4. Essential hypertension    5. Male hypogonadism          Plan:     1. Kidney stones    2. Renal mass    3. BPH with obstruction/lower urinary tract symptoms    4. Essential hypertension    5. Male hypogonadism            No orders of the defined types were placed in this encounter.      1. Kidney stone:  -General risk factors for kidney stones and the conservative measures to prevent kidney stones in the future were discussed with the patient in detail.  The patient was encouraged to drink 2-3 liters of water a day, limit iced tea and pasquale as well as foods high in oxalate.  They were cautioned to try to limit salt and red meat intake.  We also discussed adding citrate to the diet with the addition of atul or lemon juice to their water or alternatively with crystal light.   -Imaging review: as above.    -plan renal us/kub in 1 year.     2. Renal mass.   -benign appearing on recent imaging.   Will continue to monitor.    3. BPH:  -continue flomax  -symptoms mild.      4. Hypogonadism  -on TRT  -managed by Dawn Jarquin NP      5.  HTN:  --BP reviewed  -stable, continue meds and f/u with PCP      - code applied: patient requires or will require a continuous, longitudinal, and active collaborative plan of care related to this patient's health condition, kidney stones --the management of which requires the direction of a practitioner with specialized clinical knowledge, skill, and expertise.

## 2024-12-31 ENCOUNTER — OFFICE VISIT (OUTPATIENT)
Dept: UROLOGY | Facility: CLINIC | Age: 69
End: 2024-12-31
Payer: MEDICARE

## 2024-12-31 VITALS
DIASTOLIC BLOOD PRESSURE: 70 MMHG | WEIGHT: 213.19 LBS | HEART RATE: 48 BPM | HEIGHT: 72 IN | SYSTOLIC BLOOD PRESSURE: 145 MMHG | BODY MASS INDEX: 28.88 KG/M2

## 2024-12-31 DIAGNOSIS — N28.89 RENAL MASS: ICD-10-CM

## 2024-12-31 DIAGNOSIS — N13.8 BPH WITH OBSTRUCTION/LOWER URINARY TRACT SYMPTOMS: ICD-10-CM

## 2024-12-31 DIAGNOSIS — N40.1 BPH WITH OBSTRUCTION/LOWER URINARY TRACT SYMPTOMS: ICD-10-CM

## 2024-12-31 DIAGNOSIS — I10 ESSENTIAL HYPERTENSION: ICD-10-CM

## 2024-12-31 DIAGNOSIS — E29.1 MALE HYPOGONADISM: ICD-10-CM

## 2024-12-31 DIAGNOSIS — N20.0 KIDNEY STONES: Primary | ICD-10-CM

## 2024-12-31 PROCEDURE — 99214 OFFICE O/P EST MOD 30 MIN: CPT | Mod: PBBFAC | Performed by: UROLOGY

## 2024-12-31 PROCEDURE — 99999 PR PBB SHADOW E&M-EST. PATIENT-LVL IV: CPT | Mod: PBBFAC,,, | Performed by: UROLOGY

## 2025-01-02 ENCOUNTER — TELEPHONE (OUTPATIENT)
Dept: OPTOMETRY | Facility: CLINIC | Age: 70
End: 2025-01-02
Payer: MEDICARE

## 2025-01-03 ENCOUNTER — PATIENT MESSAGE (OUTPATIENT)
Dept: HEMATOLOGY/ONCOLOGY | Facility: CLINIC | Age: 70
End: 2025-01-03
Payer: MEDICARE

## 2025-01-03 ENCOUNTER — PATIENT MESSAGE (OUTPATIENT)
Dept: UROLOGY | Facility: CLINIC | Age: 70
End: 2025-01-03
Payer: MEDICARE

## 2025-01-03 ENCOUNTER — PATIENT MESSAGE (OUTPATIENT)
Dept: INTERNAL MEDICINE | Facility: CLINIC | Age: 70
End: 2025-01-03
Payer: MEDICARE

## 2025-01-03 ENCOUNTER — OFFICE VISIT (OUTPATIENT)
Dept: OPTOMETRY | Facility: CLINIC | Age: 70
End: 2025-01-03
Payer: MEDICARE

## 2025-01-03 DIAGNOSIS — H52.4 HYPEROPIA WITH ASTIGMATISM AND PRESBYOPIA, BILATERAL: ICD-10-CM

## 2025-01-03 DIAGNOSIS — H25.13 NUCLEAR SCLEROSIS OF BOTH EYES: ICD-10-CM

## 2025-01-03 DIAGNOSIS — H52.203 HYPEROPIA WITH ASTIGMATISM AND PRESBYOPIA, BILATERAL: ICD-10-CM

## 2025-01-03 DIAGNOSIS — H52.03 HYPEROPIA WITH ASTIGMATISM AND PRESBYOPIA, BILATERAL: ICD-10-CM

## 2025-01-03 DIAGNOSIS — Z13.5 GLAUCOMA SCREENING: ICD-10-CM

## 2025-01-03 DIAGNOSIS — E11.9 TYPE 2 DIABETES MELLITUS WITHOUT RETINOPATHY: Primary | ICD-10-CM

## 2025-01-03 PROCEDURE — 99213 OFFICE O/P EST LOW 20 MIN: CPT | Mod: PBBFAC,PO | Performed by: OPTOMETRIST

## 2025-01-03 PROCEDURE — 99999 PR PBB SHADOW E&M-EST. PATIENT-LVL III: CPT | Mod: PBBFAC,,, | Performed by: OPTOMETRIST

## 2025-01-03 NOTE — PROGRESS NOTES
HPI    70 Y/o male is here for routine eye exam with C/o pt states since getting   his current Rx for glasses filled in April he has not been able to see   clearly out of them say's distance are very blurry. Pt say's in the past   month he has had a slash on side of vision when he wakes in the morning.  Pt denies pain and discomfort   Occ floaters     Eye med: no gtt   Last edited by Pernell Ortega MA on 1/3/2025 12:53 PM.            Assessment /Plan     For exam results, see Encounter Report.    Type 2 diabetes mellitus without retinopathy    Nuclear sclerosis of both eyes    Glaucoma screening    Hyperopia with astigmatism and presbyopia, bilateral      DM--without retinopathy.  Advised yearly DFE  Cat OU--discussed w pt problem with his vision is not due to spex, but from cats.  Discussed surgery, but pt wishes to wait.  Happy w current Rx-- top for distance, and bottom for intermediate  Rare PVD photopsia OS resolving.  Discussed S+S of RD    PLAN:    Rtc 1 yr, or Pt to rtc immediately if inc F/F

## 2025-01-10 ENCOUNTER — INFUSION (OUTPATIENT)
Dept: INFUSION THERAPY | Facility: HOSPITAL | Age: 70
End: 2025-01-10
Payer: MEDICARE

## 2025-01-10 ENCOUNTER — RESEARCH ENCOUNTER (OUTPATIENT)
Dept: RESEARCH | Facility: HOSPITAL | Age: 70
End: 2025-01-10
Payer: MEDICARE

## 2025-01-10 ENCOUNTER — OFFICE VISIT (OUTPATIENT)
Dept: HEMATOLOGY/ONCOLOGY | Facility: CLINIC | Age: 70
End: 2025-01-10
Payer: MEDICARE

## 2025-01-10 VITALS
HEIGHT: 72 IN | RESPIRATION RATE: 16 BRPM | TEMPERATURE: 98 F | WEIGHT: 214.75 LBS | HEART RATE: 59 BPM | SYSTOLIC BLOOD PRESSURE: 111 MMHG | BODY MASS INDEX: 29.09 KG/M2 | OXYGEN SATURATION: 98 % | DIASTOLIC BLOOD PRESSURE: 52 MMHG

## 2025-01-10 DIAGNOSIS — N18.30 TYPE 2 DIABETES MELLITUS WITH STAGE 3 CHRONIC KIDNEY DISEASE, WITHOUT LONG-TERM CURRENT USE OF INSULIN, UNSPECIFIED WHETHER STAGE 3A OR 3B CKD: ICD-10-CM

## 2025-01-10 DIAGNOSIS — E11.59 HYPERTENSION ASSOCIATED WITH DIABETES: ICD-10-CM

## 2025-01-10 DIAGNOSIS — I15.2 HYPERTENSION ASSOCIATED WITH DIABETES: ICD-10-CM

## 2025-01-10 DIAGNOSIS — I48.0 PAROXYSMAL ATRIAL FIBRILLATION: ICD-10-CM

## 2025-01-10 DIAGNOSIS — C15.9 ESOPHAGEAL ADENOCARCINOMA: Primary | ICD-10-CM

## 2025-01-10 DIAGNOSIS — E11.69 HYPERLIPIDEMIA ASSOCIATED WITH TYPE 2 DIABETES MELLITUS: ICD-10-CM

## 2025-01-10 DIAGNOSIS — E78.5 HYPERLIPIDEMIA ASSOCIATED WITH TYPE 2 DIABETES MELLITUS: ICD-10-CM

## 2025-01-10 DIAGNOSIS — D69.6 THROMBOCYTOPENIA: ICD-10-CM

## 2025-01-10 DIAGNOSIS — E11.22 TYPE 2 DIABETES MELLITUS WITH STAGE 3 CHRONIC KIDNEY DISEASE, WITHOUT LONG-TERM CURRENT USE OF INSULIN, UNSPECIFIED WHETHER STAGE 3A OR 3B CKD: ICD-10-CM

## 2025-01-10 DIAGNOSIS — J38.00 VOCAL CORD PARALYSIS: ICD-10-CM

## 2025-01-10 DIAGNOSIS — I25.10 CORONARY ARTERY DISEASE INVOLVING NATIVE CORONARY ARTERY OF NATIVE HEART WITHOUT ANGINA PECTORIS: ICD-10-CM

## 2025-01-10 PROCEDURE — 25000003 PHARM REV CODE 250: Performed by: INTERNAL MEDICINE

## 2025-01-10 PROCEDURE — A4216 STERILE WATER/SALINE, 10 ML: HCPCS | Performed by: INTERNAL MEDICINE

## 2025-01-10 PROCEDURE — 63600175 PHARM REV CODE 636 W HCPCS: Performed by: INTERNAL MEDICINE

## 2025-01-10 PROCEDURE — 99214 OFFICE O/P EST MOD 30 MIN: CPT | Mod: PBBFAC | Performed by: INTERNAL MEDICINE

## 2025-01-10 PROCEDURE — 96523 IRRIG DRUG DELIVERY DEVICE: CPT

## 2025-01-10 RX ORDER — HEPARIN 100 UNIT/ML
500 SYRINGE INTRAVENOUS
Status: DISCONTINUED | OUTPATIENT
Start: 2025-01-10 | End: 2025-01-10 | Stop reason: HOSPADM

## 2025-01-10 RX ORDER — SODIUM CHLORIDE 0.9 % (FLUSH) 0.9 %
10 SYRINGE (ML) INJECTION
Status: DISCONTINUED | OUTPATIENT
Start: 2025-01-10 | End: 2025-01-10 | Stop reason: HOSPADM

## 2025-01-10 RX ADMIN — HEPARIN SODIUM (PORCINE) LOCK FLUSH IV SOLN 100 UNIT/ML 500 UNITS: 100 SOLUTION at 09:01

## 2025-01-10 RX ADMIN — Medication 10 ML: at 09:01

## 2025-01-10 NOTE — PROGRESS NOTES
MEDICAL ONCOLOGY - ESTABLISHED PATIENT VISIT    Reason for visit: esophageal cancer    Best Contact Phone Number(s): There are no phone numbers on file.     Cancer/Stage/TNM:    Cancer Staging   Esophageal adenocarcinoma  Staging form: Esophagus - Adenocarcinoma, AJCC 8th Edition  - Pathologic stage from 3/28/2023: Stage II (ypT3, pN0, cM0, G2) - Signed by Santana Brown Jr., MD on 3/28/2023       Oncology History   Esophageal adenocarcinoma   12/8/2022 Initial Diagnosis    Esophageal adenocarcinoma     12/21/2022 - 12/21/2022 Chemotherapy    Treatment Summary   Plan Name: OP ESOPHAGEAL PACLITAXEL CARBOPLATIN WEEKLY  Treatment Goal: Curative  Status: Inactive  Start Date:   End Date:   Provider: Miki Medrano MD  Chemotherapy: CARBOplatin (PARAPLATIN) in sodium chloride 0.9% 250 mL chemo infusion, , Intravenous, Clinic/HOD 1 time, 0 of 1 cycle  PACLitaxeL (TAXOL) 50 mg/m2 = 120 mg in sodium chloride 0.9% 250 mL chemo infusion, 50 mg/m2, Intravenous, Clinic/HOD 1 time, 0 of 1 cycle     12/29/2022 - 1/20/2023 Chemotherapy    Treatment Summary   Plan Name: [facility] [id number] ARM B CARBOPLATIN PACLITAXEL NIVOLUMAB  Treatment Goal: Curative  Status: Inactive  Start Date: 12/29/2022  End Date: 1/20/2023  Provider: Miki Medrano MD  Chemotherapy: CARBOplatin (PARAPLATIN) 215 mg in sodium chloride 0.9% 306.5 mL chemo infusion, 215 mg, Intravenous, Clinic/HOD 1 time, 4 of 5 cycles  Administration: 215 mg (12/29/2022), 230 mg (1/5/2023), 265 mg (1/13/2023), 265 mg (1/20/2023)  PACLitaxeL (TAXOL) 50 mg/m2 = 120 mg in sodium chloride 0.9% 250 mL chemo infusion, 50 mg/m2 = 120 mg, Intravenous, Clinic/HOD 1 time, 4 of 5 cycles  Dose modification: 50 mg/m2 (original dose 50 mg/m2, Cycle 4)  Administration: 120 mg (12/29/2022), 120 mg (1/5/2023), 120 mg (1/13/2023), 120 mg (1/20/2023)     12/29/2022 - 2/1/2023 Radiation Therapy    Treating physician: Dr. Javier Moulton    Course: C1 CHEST 2022    Treatment Site Ref.  ID Energy Dose/Fx (Gy) #Fx Dose Correction (Gy) Total Dose (Gy) Start Date End Date Elapsed Days   IM Esophagus [id number] 6X 1.8 23 / 23 0 41.4 12/29/2022 2/1/2023 34          3/17/2023 Surgery    Procedure: Procedure(s) (LRB):  XI ROBOTIC ESOPHAGECTOMY (N/A)  ROBOTIC JEJUNOSTOMY TUBE INSERTION (N/A)      Surgeon(s) and Role:     * Santana Brown Jr., MD - Primary     * Darian Murphy MD - Assisting     Assistance: Due to having no qualified resident during critical portions of the case, Dr. Darian Murphy acted as an assistant.     Pre-Operative Diagnosis: Esophageal adenocarcinoma, distal 1/3     Post-Operative Diagnosis: Same     Pre-Operative Variables:  Stage: T3 N0  Adjacent organ involvement: None  Chemotherapy within 90 Days: Yes  Radiation Therapy within 90 Days: Yes     Comorbidities:  Morbid obesity  Type 2 diabetes mellitus  Obstructive sleep apnea  Gout  Hyperparathyroidism  Hypertension  Severe obesity  Coronary artery disease  Heart failure with reduced ejection fraction    Procedure:  Robotic-assisted Vj three field esophagectomy  Regional mediastinal lymph node dissection  Botox injection of the pylorus  Jejunostomy tube placement     Operative Findings:                No evidence of distant intraperitoneal metastases in the chest or abdomen  Esophageal tumor located in the distal third of the esophagus, mild radiation changes appreciated.  Resection status:  R0 pending final pathology.  No gross disease remaining post dissection.  Frozen sections on proximal and distal margins negative for malignancy  100u Botox injection into the pylorus  Stapled esophagogastrostomy performed at the neck incision after confirmation of negative margins.  Jejunostomy tube placed      3/28/2023 Cancer Staged    Staging form: Esophagus - Adenocarcinoma, AJCC 8th Edition  - Pathologic stage from 3/28/2023: Stage II (ypT3, pN0, cM0, G2)     5/1/2023 - 5/1/2023 Chemotherapy    Treatment Summary   Plan Name: [facility] [id number]  ARM C ADJUVANT NIVOLUMAB  Treatment Goal: Curative  Status: Inactive  Start Date: 5/1/2023  End Date: 5/1/2023  Provider: Miki Medrano MD  Chemotherapy: [No matching medication found in this treatment plan]     5/29/2023 - 4/1/2024 Chemotherapy    Treatment Summary   Plan Name: [facility] [place] [id number] ARM C ADJUVANT NIVOLUMAB STEP 2  Treatment Goal: Curative  Status: Inactive  Start Date: 5/29/2023  End Date: 4/1/2024  Provider: Miki Medrano MD  Chemotherapy: [No matching medication found in this treatment plan]          Interim History:   Mr. Person returns to clinic today for follow-up while on surveillance after completion of adjuvant nivolumab. He underwent robotic esophagectomy on 3/14/23 with Dr. Brown and J tube insertion.     Doing well overall. He continues to eat well.  Denies dysphagia. Has to remember to eat relatively slowly and not too much at a time.    He does admit to occasionally dyspnea on exertion but actually it improves as he exerts himself, worst just when he starts.  Denies chest pain.  Has not required any nitro. Says he plans to follow-up with his cardiologist.    Presents to clinic alone. ECOG 0.     Review of Systems   Constitutional:  Negative for chills, diaphoresis, fever, malaise/fatigue and weight loss.   HENT:  Negative for sore throat.    Eyes:  Negative for blurred vision and double vision.   Respiratory:  Positive for shortness of breath. Negative for cough.    Cardiovascular:  Negative for chest pain, palpitations and leg swelling.   Gastrointestinal:  Negative for abdominal pain, blood in stool, constipation, diarrhea, heartburn, nausea and vomiting.   Genitourinary:  Negative for dysuria, frequency, hematuria and urgency.   Musculoskeletal:  Negative for back pain, falls, myalgias and neck pain.   Skin:  Negative for itching and rash.   Neurological:  Negative for dizziness, tingling, weakness and headaches.   Psychiatric/Behavioral:  The patient is not nervous/anxious.       Past Medical History:   Past Medical History:   Diagnosis Date    Anticoagulant long-term use     Cancer     Diabetes mellitus     Onset late 50s/early 60s    Hyperlipidemia     Hypertension     Onset late 50s/early 60s    Kidney stones     Sleep apnea     since 2006      Past Surgical History:   Past Surgical History:   Procedure Laterality Date    COLONOSCOPY N/A 10/26/2023    Procedure: COLONOSCOPY;  Surgeon: Noah Duong MD;  Location: [facility]TH FLR);  Service: Endoscopy;  Laterality: N/A;  instructions sent to patient portal. Tb  referral: Dr. Wilson  Pt. on Xarelto--tb-ok to hold see te 8/15/23 dr vu  10/13-precall complete-MS  10/19 pt rescheduled, Xarelto hold, PEG, portal -ml  10/24-precall complete-KPvt    CORONARY ANGIOGRAPHY N/A 9/30/2020    Procedure: ANGIOGRAM, CORONARY ARTERY;  Surgeon: John West MD;  Location: [facility] CATH LAB;  Service: Cardiology;  Laterality: N/A;    CYSTOSCOPY N/A 2/17/2022    Procedure: CYSTOSCOPY;  Surgeon: Lukasz Hughes MD;  [facility];  Service: Urology;  Laterality: N/A;    CYSTOSCOPY W/ URETERAL STENT PLACEMENT N/A 2/25/2022    Procedure: CYSTOSCOPY, WITH URETERAL STENT INSERTION;  Surgeon: Lukasz Hughes MD;  Location: [facility] OR [address] [place] [address];  Service: Urology;  Laterality: N/A;    ENDOSCOPIC ULTRASOUND OF UPPER GASTROINTESTINAL TRACT N/A 12/7/2022    Procedure: ULTRASOUND, UPPER GI TRACT, ENDOSCOPIC;  Surgeon: Darian Main MD;  Location: [facility];  Service: Endoscopy;  Laterality: N/A;  Approval to hold Xarelto rec'd from Dr. Vu (see t/e 12/5/22)-DS    ESOPHAGOGASTRODUODENOSCOPY N/A 11/17/2022    Procedure: EGD (ESOPHAGOGASTRODUODENOSCOPY);  Surgeon: Brody Gonzales MD;  Location: [facility] F[facility]);  Service: Endoscopy;  Laterality: N/A;  inst via email-ok to hold Xarelto x 2 days-MS    ESOPHAGOGASTRODUODENOSCOPY N/A 5/31/2023    Procedure: EGD (ESOPHAGOGASTRODUODENOSCOPY) WITH DILATION;  Surgeon: Santana Brown  MD Mal[place];  Location: [facility] OR 2ND FLR;  Service: General;  Laterality: N/A;    ESOPHAGOGASTRODUODENOSCOPY N/A 5/3/2024    Procedure: EGD (ESOPHAGOGASTRODUODENOSCOPY) with botox injection;  Surgeon: Santana Brown Jr., MD;  Location: [facility] OR 2ND FLR;  Service: General;  Laterality: N/A;    INJECTION OF BOTULINUM TOXIN TYPE A  5/3/2024    Procedure: INJECTION, BOTULINUM TOXIN, TYPE A;  Surgeon: Santana Brown Jr., MD;  Location: [facility] OR 2ND FLR;  Service: General;;    LASER LITHOTRIPSY Left 2/25/2022    Procedure: LITHOTRIPSY, USING LASER;  Surgeon: Lukasz Hughes MD;  Location: [facility] OR 1ST FLR;  Service: Urology;  Laterality: Left;    LEFT HEART CATHETERIZATION Left 9/30/2020    Procedure: Left heart cath;  Surgeon: John West MD;  Location: [facility] CATH LAB;  Service: Cardiology;  Laterality: Left;    LITHOTRIPSY      PARATHYROIDECTOMY  1/1/2-107    PYELOSCOPY Left 2/25/2022    Procedure: PYELOSCOPY;  Surgeon: Lukasz Hughes MD;  [facility];  Service: Urology;  Laterality: Left;    ROBOT-ASSISTED SURGICAL REMOVAL OF ESOPHAGUS USING DA CARLOS XI N/A 3/14/2023    Procedure: XI ROBOTIC ESOPHAGECTOMY;  Surgeon: Santana Brown Jr., MD;  [facility];  Service: General;  Laterality: N/A;  Abdomen, Chest and Neck    ROBOTIC JEJUNOSTOMY N/A 3/14/2023    Procedure: ROBOTIC JEJUNOSTOMY TUBE INSERTION;  Surgeon: Santana Brown Jr., MD;  [facility];  Service: General;  Laterality: N/A;    TRANSESOPHAGEAL ECHOCARDIOGRAPHY N/A 4/7/2022    Procedure: ECHOCARDIOGRAM, TRANSESOPHAGEAL;  Surgeon: [facility] Diagnostic Provider;  Location: [facility] EP LAB;  Service: Cardiology;  Laterality: N/A;    TREATMENT OF CARDIAC ARRHYTHMIA N/A 4/7/2022    Procedure: Cardioversion or Defibrillation;  Surgeon: Iris Fisher NP;  Location: [facility] EP LAB;  Service: Cardiology;  Laterality: N/A;  afib, dccv, artie, anes, EH, 3prep    URETEROSCOPIC REMOVAL OF URETERIC CALCULUS Left 2/25/2022     Procedure: REMOVAL, CALCULUS, URETER, URETEROSCOPIC;  Surgeon: Lukasz Hughes MD;  Location: [facility] OR 1ST FLR;  Service: Urology;  Laterality: Left;    URETEROSCOPY Left 2022    Procedure: URETEROSCOPY;  Surgeon: Lukasz Hughes MD;  Location: [facility] OR 1ST FLR;  Service: Urology;  Laterality: Left;    VOCAL CORD INJECTION Left 3/17/2023    Procedure: INJECTION, VOCAL CORD, LARYNGOSCOPIC;  Surgeon: Gm Altman MD;  Location: [facility] OR 2ND FLR;  Service: ENT;  Laterality: Left;      Family History:   Family History   Problem Relation Name Age of Onset    Arthritis Mother Juany     Heart disease Father Diomedes 70        CABG    Arthritis Father Diomedes     Diabetes Father Diomedes     Kidney disease Father Diomedes         had one kidney removed in early thirties    Arthritis Sister Dee     Arthritis Sister Josette     Hypertension Sister Delores     Colon cancer Brother Sterling 32    Arthritis Brother Diomedes     Alcohol abuse Paternal Aunt Betina     Cancer Maternal Grandfather Yaron         throat cancer    Diabetes Paternal Grandmother Cassandra     Colon polyps Paternal Grandfather Diomedes     Colon cancer Paternal Grandfather Diomedes     Cancer Paternal Grandfather Diomedes         colon cancer at age 62      Social History:   Social History     Tobacco Use    Smoking status: Former     Current packs/day: 0.00     Average packs/day: 1 pack/day for 22.7 years (22.7 ttl pk-yrs)     Types: Cigarettes, Cigars     Start date: 1972     Quit date:      Years since quittin.6     Passive exposure: Never    Smokeless tobacco: Never    Tobacco comments:     smoking was off and on.  cumulative 7 years.  never more than three years in one stretch or more lj   Substance Use Topics    Alcohol use: Yes     Alcohol/week: 4.0 standard drinks of alcohol     Types: 4 Shots of liquor per week      I have reviewed and updated the patient's past medical, surgical, family and social  histories.    Allergies:   Review of patient's allergies indicates:  No Known Allergies     Medications:   Current Outpatient Medications   Medication Sig Dispense Refill    allopurinoL (ZYLOPRIM) 100 MG tablet TAKE 1 TABLET BY MOUTH EVERY DAY 90 tablet 3    blood sugar diagnostic (ACCU-CHEK ANTONELLA PLUS TEST STRP) Strp Use to test CBG four times daily E11.65 400 strip 3    blood-glucose meter kit Checks blood sugars 1x/daily. 1 each 12    hydroCHLOROthiazide (HYDRODIURIL) 25 MG tablet TAKE 1 TABLET(25 MG) BY MOUTH EVERY DAY 30 tablet 11    lancets (ACCU-CHEK SOFTCLIX LANCETS) Misc USE 1 EVERY DAY OR AS DIRECTED 100 each 3    losartan (COZAAR) 25 MG tablet Take 1 tablet (25 mg total) by mouth 2 (two) times a day. 180 tablet 3    metoprolol succinate (TOPROL-XL) 25 MG 24 hr tablet Take 0.5 tablets (12.5 mg total) by mouth once daily. 45 tablet 2    nitroGLYCERIN (NITROSTAT) 0.4 MG SL tablet Place 1 tablet (0.4 mg total) under the tongue every 5 (five) minutes as needed for Chest pain. If you need a third tablet, call 911 100 tablet 3    omeprazole (PRILOSEC) 40 MG capsule TAKE 1 CAPSULE(40 MG) BY MOUTH EVERY DAY 90 capsule 3    rosuvastatin (CRESTOR) 20 MG tablet TAKE 1 TABLET(20 MG) BY MOUTH EVERY EVENING 90 tablet 3    tamsulosin (FLOMAX) 0.4 mg Cap TAKE 1 CAPSULE(0.4 MG) BY MOUTH EVERY DAY 30 capsule 11    testosterone (ANDROGEL) 20.25 mg/1.25 gram (1.62 %) [facility] Apply 4 pumps to shoulders daily 2 each 5    warfarin (COUMADIN) 4 MG tablet 6mg PO Sun, Tues, Thurs and 4mg PO all other days -- OR AS DIRECTED BY COUMADIN CLINIC       No current facility-administered medications for this visit.      Physical Exam:   BP (!) 111/52 (BP Location: Left arm, Patient Position: Sitting)   Pulse (!) 59   Temp 97.8 °F (36.6 °C) (Oral)   Resp 16   Ht 6' (1.829 m)   Wt 97.4 kg (214 lb 11.7 oz)   SpO2 98%   BMI 29.12 kg/m²        Physical Exam  Vitals reviewed.   Constitutional:       General: He is not in acute distress.      Appearance: Normal appearance. He is not ill-appearing, toxic-appearing or diaphoretic.   HENT:      Head: Normocephalic and atraumatic.      Right Ear: External ear normal.      Left Ear: External ear normal.      Nose: Nose normal. No congestion.      Mouth/Throat:      Pharynx: Oropharynx is clear.   Eyes:      General: No scleral icterus.     Extraocular Movements: Extraocular movements intact.      Conjunctiva/sclera: Conjunctivae normal.      Pupils: Pupils are equal, round, and reactive to light.   Cardiovascular:      Rate and Rhythm: Normal rate and regular rhythm.   Pulmonary:      Effort: Pulmonary effort is normal. No respiratory distress.   Chest:      Comments: RCW port  Abdominal:      General: There is no distension.      Palpations: Abdomen is soft.      Tenderness: There is no abdominal tenderness.   Musculoskeletal:         General: No swelling.      Cervical back: Normal range of motion.   Lymphadenopathy:      Cervical: No cervical adenopathy.   Skin:     Coloration: Skin is not jaundiced.      Findings: No bruising, erythema or rash.   Neurological:      General: No focal deficit present.      Mental Status: He is alert and oriented to person, place, and time. Mental status is at baseline.      Cranial Nerves: No cranial nerve deficit.      Motor: No weakness.      Gait: Gait normal.   Psychiatric:         Mood and Affect: Mood normal.         Behavior: Behavior normal.         Thought Content: Thought content normal.         Judgment: Judgment normal.         Labs:   Recent Results (from the past 48 hours)   Lipid Panel    Collection Time: 01/10/25  8:08 AM   Result Value Ref Range    Cholesterol 114 (L) 120 - 199 mg/dL    Triglycerides 110 30 - 150 mg/dL    HDL 55 40 - 75 mg/dL    LDL Cholesterol 37.0 (L) 63.0 - 159.0 mg/dL    HDL/Cholesterol Ratio 48.2 20.0 - 50.0 %    Total Cholesterol/HDL Ratio 2.1 2.0 - 5.0    Non-HDL Cholesterol 59 mg/dL   Testosterone    Collection Time: 01/10/25  8:08  AM   Result Value Ref Range    Testosterone, Total 139 (L) 304 - 1227 ng/dL   Prostate Specific Antigen, Diagnostic    Collection Time: 01/10/25  8:08 AM   Result Value Ref Range    PSA Diagnostic 1.1 0.00 - 4.00 ng/mL   CBC W/ AUTO DIFFERENTIAL    Collection Time: 01/10/25  8:08 AM   Result Value Ref Range    WBC 4.11 3.90 - 12.70 K/uL    RBC 4.57 (L) 4.60 - 6.20 M/uL    Hemoglobin 13.7 (L) 14.0 - 18.0 g/dL    Hematocrit 43.7 40.0 - 54.0 %    MCV 96 82 - 98 fL    MCH 30.0 27.0 - 31.0 pg    MCHC 31.4 (L) 32.0 - 36.0 g/dL    RDW 14.4 11.5 - 14.5 %    Platelets 129 (L) 150 - 450 K/uL    MPV 11.2 9.2 - 12.9 fL    Immature Granulocytes 0.2 0.0 - 0.5 %    Gran # (ANC) 2.8 1.8 - 7.7 K/uL    Immature Grans (Abs) 0.01 0.00 - 0.04 K/uL    Lymph # 0.7 (L) 1.0 - 4.8 K/uL    Mono # 0.4 0.3 - 1.0 K/uL    Eos # 0.2 0.0 - 0.5 K/uL    Baso # 0.04 0.00 - 0.20 K/uL    nRBC 0 0 /100 WBC    Gran % 68.6 38.0 - 73.0 %    Lymph % 17.0 (L) 18.0 - 48.0 %    Mono % 8.8 4.0 - 15.0 %    Eosinophil % 4.4 0.0 - 8.0 %    Basophil % 1.0 0.0 - 1.9 %    Differential Method Automated    Magnesium    Collection Time: 01/10/25  8:08 AM   Result Value Ref Range    Magnesium 1.9 1.6 - 2.6 mg/dL   PHOSPHORUS    Collection Time: 01/10/25  8:08 AM   Result Value Ref Range    Phosphorus 2.8 2.7 - 4.5 mg/dL   BILIRUBIN, DIRECT    Collection Time: 01/10/25  8:08 AM   Result Value Ref Range    Bilirubin, Direct 0.3 0.1 - 0.3 mg/dL   TSH    Collection Time: 01/10/25  8:08 AM   Result Value Ref Range    TSH 1.955 0.400 - 4.000 uIU/mL   CORTISOL, RANDOM    Collection Time: 01/10/25  8:08 AM   Result Value Ref Range    Cortisol 10.70 ug/dL   COMPREHENSIVE METABOLIC PANEL    Collection Time: 01/10/25  8:08 AM   Result Value Ref Range    Sodium 141 136 - 145 mmol/L    Potassium 3.9 3.5 - 5.1 mmol/L    Chloride 105 95 - 110 mmol/L    CO2 27 23 - 29 mmol/L    Glucose 261 (H) 70 - 110 mg/dL    BUN 14 8 - 23 mg/dL    Creatinine 1.1 0.5 - 1.4 mg/dL    Calcium 9.0 8.7 -  10.5 mg/dL    Total Protein 7.3 6.0 - 8.4 g/dL    Albumin 4.0 3.5 - 5.2 g/dL    Total Bilirubin 0.7 0.1 - 1.0 mg/dL    Alkaline Phosphatase 79 40 - 150 U/L    AST 28 10 - 40 U/L    ALT 29 10 - 44 U/L    eGFR >60.0 >60 mL/min/1.73 m^2    Anion Gap 9 8 - 16 mmol/L     Imaging:      10/10/24 - CT CAP:  Impression:     Postoperative change of esophagectomy with gastric pull-through.  No convincing evidence of metastatic disease in the chest, abdomen, or pelvis.     New nonspecific 0.2 cm nodule at the left lung base.  Previously noted 3 mm left apical nodule not evident.  Attention on follow-up.     Aortic valve calcification.     Focal thickening/luminal narrowing of the distal sigmoid colon, presumably peristalsis.  Colonoscopy October 26, 2023 demonstrated no abnormality per report.     Other findings as above.     There are no measurable lesions per RECIST criteria.       Path:   3/14/23:  Final Pathologic Diagnosis 1. LYMPH NODE, LEVEL 7, EXCISION:   One benign lymph node (0/1)   2. TOTAL THORACIC ESOPHAGECTOMY AND PROXIMAL STOMACH:   Adenocarcinoma, moderately differentiated, 3.0 cm   Margins are negative   Tumor invades adventitia   Extensive residual cancer with no evident tumor regression (poor or no   response, score 3)   Eleven benign lymph nodes (0/11)   3. RESIDUAL CONDUIT, EXCISION:   Negative for malignancy   SYNOPTIC REPORT   Procedure - Esophageal gastrectomy   Tumor site - GE Junction   Relationship of tumor to esophagogastric junction - Lesion is central at GE   junction   Tumor size - 3.0 x 3.1cm   Histologic type - Adenocarcinoma   Histologic grade - Moderately differentiated   Microscopic tumor extension - Tumor invades adventitia   Margins - All margins of resection are uninvolved, proximal, distal and   adventitial margins are negative   Treatment effect - Extensive residual cancer with no evident tumor regression   (poor or no response, score 3)   Lymphovascular invasion - Not identified    Pathologic staging - yp T3 N0 Mx   Lymph nodes examined - 12   Lymph nodes involved - 0   Additional pathologic findings - Intestinal metaplasia present   MMR-IHC has been performed on previous biopsy ([id number]) and shows all 4   antibodies intact      Assessment:       1. Esophageal adenocarcinoma    2. Vocal cord paralysis    3. Hypertension associated with diabetes    4. Hyperlipidemia associated with type 2 diabetes mellitus    5. Type 2 diabetes mellitus with stage 3 chronic kidney disease, without long-term current use of insulin, unspecified whether stage 3a or 3b CKD    6. Coronary artery disease involving native coronary artery of native heart without angina pectoris    7. Paroxysmal atrial fibrillation    8. Thrombocytopenia          Plan:     # Esophageal adenocarcinoma   Mr. Person is a pleasant 67 year old male who presents to our clinic for management of esophageal cancer. He initially presented with dysphagia, and further workup confirmed a stage II adenocarcinoma, pMMR. Tumor staged T3N0Mx by endosonographic criteria, JEVON. CT CAP on 12/14/22 confirmed no evidence of metastatic disease.    Previously conversation regarding his diagnosis and treatment options.  Recommended perioperative chemo/radiation per the CROSS trial. Discussed chemoradiation for ~5 weeks with 5 doses of weekly carboplatin and taxol administered. Plan to obtain restaging scans 4-5 weeks after radiation completed.     He was a candidate for a cooperative group trial assessing perioperative immunotherapy treatment. He consented for this study - ECOG-ACRIN [id number]: A Phase II/III Study of Dilcia-operative Nivolumab and Ipilimumab in Patients with Locoregional Esophageal and Gastroesophageal Junction Adenocarcinoma    Previously met with Dr. Santana Brown who agreed he was a surgical candidate.  Previously met with Dr. Javier Moulton who will be treating him with radiation.    Began cycle 1 carboplatin/paclitaxel/nivolumab on  trial on 12/29/22.  Received cycle 4 of weekly chemotherapy on trial on 1/20/23.   We held chemotherapy for week 5 because of thrombocytopenia per protocol.    Completed radiation on 2/1/23.    Restaging PET/CT personally reviewed on 3/1/23 shows interval decreased FDG avidity in esophageal tumor, no new sites of disease.  CT CAP 3/2/23 shows stable esophageal thickening.    Underwent robotic esophagectomy on 3/14/23 with Dr. Brown.  Pathology showed negative margins, ypT3 N0 tumor with 0 of 12 lymph nodes involved.  No treatment effect was noted on the tumor tissue.    Discussed that per the [id number] protocol will proceed with randomization to adjuvant nivolumab +/- ipilimumab. Patient randomized to receive adjuvant Nivolumab, started cycle 1 at 480 mg q4 weeks 5/1/23. Plan for twelve months per protocol.    Tolerating very well. Grade 1 pruritis.    Repeat imaging after cycle 6 shows DAVE.  Repeat imaging after cycle 13 (final cycle) shows DAVE.    Final cycle administered 4/1/24.    Underwent dilation with Dr. Brown on 5/31/23 with temporary improvement in dysphagia. Weight stable.  Still having some dysphagia so esophogram ordered which showed concern for pyloric stricture.   Underwent Botox injection into pylorus with Dr. Brown on 5/3/24.     PD-L1 CPS 30.    CT CAP 10/2024 shows DAVE.    Doing well with no new concerns.      RTC in 3 months per protocol with imaging.    # Vocal cord paralysis  Post-op. S/p injection by ENT.  Stable. Last evaluated 9/11/23 by ENT with improvement.    Now essentially resolved.    # HTN, HLD, DM, CKD  Following with PCP Dr. Wilson and nephrologist Dr. Oconnell.   BP managed by Dr. Nick.  BP normal today. Taking losartan and HCTZ.  Cr stable.    # CAD, A fib, CHF  Following with cardiologist Dr. Nick & EP Dr. Phillip.   Was on Xarelto. Now on warfarin. Being monitored by coumadin clinic. Labs monitored on regular basis.   Recommended he discuss his dyspnea with his  cardiologist.  Continue medical management.     # Cytopenias  Chronic TCP and anemia.  No change.  Monitor.    Follow up: per research.    Patient is in agreement with the proposed treatment plan. All questions were answered to the patient's satisfaction. Pt knows to call clinic if anything is needed before the next clinic visit.    Miki Medrano MD  Hematology/Oncology  [facility]      Route Chart for Scheduling    Med Onc Chart Routing      Follow up with physician . Per research   Follow up with SAMUEL    Infusion scheduling note    Injection scheduling note    Labs    Imaging    Pharmacy appointment    Other referrals                Supportive Plan Information  IV FLUIDS AND ELECTROLYTES Miki Medrano MD   Associated Diagnosis: Esophageal adenocarcinoma Stage II ypT3, pN0, cM0, G2 noted on 12/8/2022   Line of treatment: Supportive Care   Treatment goal: Supportive     Upcoming Treatment Dates - IV FLUIDS AND ELECTROLYTES    No upcoming days in selected categories.    Therapy Plan Information  PORT FLUSH for Esophageal adenocarcinoma, noted on 12/8/2022  Flushes  heparin, porcine (PF) 100 unit/mL injection flush 500 Units  500 Units, Intravenous, Every visit  sodium chloride 0.9% flush 10 mL  10 mL, Intravenous, Every visit      No therapy plan of the specified type found.    No therapy plan of the specified type found.

## 2025-01-10 NOTE — PROGRESS NOTES
: URIEL Manriquez MD  Treating Investigators: NIRAV Medrano MD  Surgical Oncologist: SARAH Brown M.D. / Gerri Zhang NP  Radiation Oncologist: Javier Moulton M.D, PhD     Protocol: ECOG-ACRIN [id number]  IRB#: [id number]  Patient ID: [id number]  Treatment: Arm B - Carbo+Taxol+Nivolumab  Treatment: Arm C - Nivolumab Monotherapy         A Phase II/III Study of Dilcia-operative Nivolumab and Ipilimumab in Patients with Locoregional Esophageal and Gastroesophageal Junction Adenocarcinoma     Month 9 Follow Up : 10Jan2025  Patient presents to clinic today unaccompanied for his 9 month follow-up visit. Patient queried & verbalized his willingness to continue his participation on the above-mentioned trial. Patient queried & reports stable, intermittent dysphagia. Patient also reports dyspnea that resolves with rest approximately 1 month ago. Patient queried and denies any new or changed ConMeds since his last visit.    Patient completed his safety labs, VS, PE & ECOG (ECOG = 0, per Dr. Medrano) today per protocol. Dr. Medrano assessed all patient's labs, VS & PE findings as either WNL or NCS.    Patient will RTC for Month 12  follow-up as outlined below, and every 3 months until April 2026 with CT scans every 6 months. Patient was encouraged to contact [facility] or Dr. Medrano's team with any questions or concerns & was reminded of clinic's 24/7 emergency contact number. Patient verbalized understanding & denies any additional questions at this time.        Step 2 -- Month 12 Follow Up:  [id number] @ 1015 - [facility] draw   [id number] @ 1130 - CT C/A/P at [facility]  [id number] @ 0930 [facility]      Solicited AEs -- Assessed 10Jan2025:  ** Anemia - Grade 1 -- 30Jan2024 - ongoing -- (NCS per MD)  ** Platelet count decreased - Grade 1 -- 04Mar2024 - ongoing  (NCS per MD)   White blood cell count decreased - Grade 0  Neutrophil count decreased - Grade 0   ** Lymphocyte count decreased - Grade 2 - 29Apr2024 - ongoing (NCS per  MD)  Peripheral motor neuropathy - Grade 0   Peripheral sensory neuropathy - Grade 0   Creatinine increased - Grade 0  Hypothyroidism - Grade 0   (TSH WNL on 16Oct2023)  Diarrhea (patient baseline BM = 2 stools a day) - Grade 0 -- reports loose stools vs. diarrhea   **Fatigue - Grade 1 -- 21Aug2023 - ongoing (NCS per MD)  Nausea - Grade 0   **Cough - Grade 1  -- Medical History  Pneumonitis - Grade 0  Hyperglycemia - Grade 0  Adrenal Insufficiency - Grade 0 - 10Jan2025 cortisol WNL  **Rash, maculopapular - Grade 1 - 17Jul2023 - ongoing ( used Sarna [camphor 0.5% - menthol 0.5&] PRN -- Prescribed triamcinolone 24Jul2023 ) -- (NCS per MD)  **Pruritis - Grade 1 - 17Jul2023 - ongoing -- (NCS per MD)  Erythema multiforme - Grade 0  Vomiting - Grade 0    Lipase increased -- Not required per protocol and therefore not assessed this visit.      Ongoing Non-Solicited AEs:  Hoarseness - Grade 2 - 14Mar2023 - ongoing ( no treatment )  Weight loss - Grade 2 - 24Jul2023 - ongoing ( no treatment )      New or Changed Non-Solicited AEs Since Last Visit:  Dysphagia - Grade 2 - [id number] - 01Nov2024 ( no treatment )    Dysphagia, intermittent - Grade 1 - 02Nov2025 - ongoing ( no treatment )   Dyspnea, intermittent - Grade 1 - [id number] - ongoing ( no treatment - resolves with rest )          Complete Baseline Medical History, Adverse Events, and Concomitant Medications are located in patient's shadow chart    ** Note: Per protocol version [id number], patient's will be seen every 3 months from the completion of adjuvant therapy

## 2025-01-10 NOTE — NURSING
Implanted port accessed using sterile technique, flushed easily with adequate blood return. Port flushed with NS and good blood return. Port deaccessed. Pt tolerated well with no questions or complaints.

## 2025-01-27 ENCOUNTER — ANTI-COAG VISIT (OUTPATIENT)
Dept: CARDIOLOGY | Facility: CLINIC | Age: 70
End: 2025-01-27
Payer: MEDICARE

## 2025-01-27 ENCOUNTER — OFFICE VISIT (OUTPATIENT)
Dept: UROLOGY | Facility: CLINIC | Age: 70
End: 2025-01-27
Payer: MEDICARE

## 2025-01-27 ENCOUNTER — PATIENT MESSAGE (OUTPATIENT)
Dept: CARDIOLOGY | Facility: CLINIC | Age: 70
End: 2025-01-27

## 2025-01-27 ENCOUNTER — LAB VISIT (OUTPATIENT)
Dept: LAB | Facility: HOSPITAL | Age: 70
End: 2025-01-27
Attending: INTERNAL MEDICINE
Payer: MEDICARE

## 2025-01-27 DIAGNOSIS — E78.5 DYSLIPIDEMIA: ICD-10-CM

## 2025-01-27 DIAGNOSIS — R53.83 FATIGUE, UNSPECIFIED TYPE: ICD-10-CM

## 2025-01-27 DIAGNOSIS — E29.1 MALE HYPOGONADISM: Primary | ICD-10-CM

## 2025-01-27 DIAGNOSIS — N40.1 BPH WITH URINARY OBSTRUCTION: ICD-10-CM

## 2025-01-27 DIAGNOSIS — N52.01 ERECTILE DYSFUNCTION DUE TO ARTERIAL INSUFFICIENCY: ICD-10-CM

## 2025-01-27 DIAGNOSIS — N13.8 BPH WITH URINARY OBSTRUCTION: ICD-10-CM

## 2025-01-27 DIAGNOSIS — I48.0 PAROXYSMAL ATRIAL FIBRILLATION: Primary | Chronic | ICD-10-CM

## 2025-01-27 DIAGNOSIS — E34.9 TESTOSTERONE DEFICIENCY: ICD-10-CM

## 2025-01-27 DIAGNOSIS — I48.0 PAROXYSMAL ATRIAL FIBRILLATION: Chronic | ICD-10-CM

## 2025-01-27 DIAGNOSIS — N40.1 BPH WITH OBSTRUCTION/LOWER URINARY TRACT SYMPTOMS: ICD-10-CM

## 2025-01-27 DIAGNOSIS — N13.8 BPH WITH OBSTRUCTION/LOWER URINARY TRACT SYMPTOMS: ICD-10-CM

## 2025-01-27 LAB
INR PPP: 2.5 (ref 0.8–1.2)
PROTHROMBIN TIME: 26 SEC (ref 9–12.5)

## 2025-01-27 PROCEDURE — 93793 ANTICOAG MGMT PT WARFARIN: CPT | Mod: ,,,

## 2025-01-27 PROCEDURE — 85610 PROTHROMBIN TIME: CPT | Performed by: INTERNAL MEDICINE

## 2025-01-27 PROCEDURE — 36415 COLL VENOUS BLD VENIPUNCTURE: CPT | Mod: PO | Performed by: INTERNAL MEDICINE

## 2025-01-27 PROCEDURE — 98006 SYNCH AUDIO-VIDEO EST MOD 30: CPT | Mod: 95,,, | Performed by: NURSE PRACTITIONER

## 2025-01-27 RX ORDER — TESTOSTERONE 20.25 MG/1.25G
GEL TOPICAL
Qty: 2 EACH | Refills: 5 | Status: SHIPPED | OUTPATIENT
Start: 2025-01-27

## 2025-01-27 RX ORDER — TAMSULOSIN HYDROCHLORIDE 0.4 MG/1
0.4 CAPSULE ORAL DAILY
Qty: 90 CAPSULE | Refills: 3 | Status: SHIPPED | OUTPATIENT
Start: 2025-01-27 | End: 2026-01-27

## 2025-01-27 NOTE — PROGRESS NOTES
The patient location is: [place]  The chief complaint leading to consultation is: Low Testosterone    Visit type: audiovisual    Face to Face time with patient: 10 minutes  30 minutes of total time spent on the encounter, which includes face to face time and non-face to face time preparing to see the patient (eg, review of tests), Obtaining and/or reviewing separately obtained history, Documenting clinical information in the electronic or other health record, Independently interpreting results (not separately reported) and communicating results to the patient/family/caregiver, or Care coordination (not separately reported).         Each patient to whom he or she provides medical services by telemedicine is:  (1) informed of the relationship between the physician and patient and the respective role of any other health care provider with respect to management of the patient; and (2) notified that he or she may decline to receive medical services by telemedicine and may withdraw from such care at any time.    Notes:     Lukasz Person is a 69 y.o. male who is an established patient in our clinic. He has a history of Kidney Stones, Renal Mass, HTN, BPH and Hypogonadism.  He was last seen in clinic with Dr. Hughes 11/28/2023. He has a f/u in 11/19/2024 with new imaging.      He has been managing his Low Testosterone with AndroGel 1.62%.   Since taking TRT, he has more energy; overall feeling better.   ED > 1 year but not bothersome.      01/10/2025 TRT LABS:   - T was 139 (ran out)  - PSA 1.1 (stable)  - HCT 43.7(stable)  - Lipids stable.   - 04/04/2024 normal LFTs.     Overall doing well.  Voices no uro complaints.   He does have some LUTS; taking Flomax daily; no issues.   No issues with AndroGel; very pleased with TRT except when runs out.   No abdominal or flank pain.     He was originally from [place].       I spent a total of 30 minutes on the day of the visit. This includes face to face time and non-face to face time  preparing to see the patient (eg, review of tests), obtaining and/or reviewing separately obtained history, documenting clinical information in the electronic or other health record, independently interpreting results and communicating results to the patient/family/caregiver, or care coordinator  Reviewed TRT and expectations.   Reassurance with recent TRT labs  Reviewed management; I  Recommendations for PCP follow up visit; any need to go to the ER.    Recommended lifestyle modifications with a proper, healthy diet, good hydration but during the day. Reducing bladder irritants.   Benefits of regular exercise and weight loss.   Refilled AndroGel; continue flomax  Full labs in 6 months.

## 2025-01-27 NOTE — PROGRESS NOTES
[facility]    01/27/2025    Lukasz Person (69 y.o.) is followed by the [email or web address].      Assessment/Plan:    Lukasz Person presents with a therapeutic INR. Goal INR: 2.0-3.0    Lab Results   Component Value Date    INR 2.5 (H) 01/27/2025    INR 3.0 (H) 12/19/2024    INR 2.7 (H) 11/21/2024       Assessment of patient findings per MA/LPN and chart review:   The following significant findings were found:   none    Recommendation for patient's warfarin regimen:   No change was made to warfarin therapy during this visit and patient has been instructed to continue their current warfarin regimen.    Recommended repeat INR in 6 weeks      Melissa Kinsey, PharmD, BCPS  Clinical Pharmacist - [email or web address]  Preferred Contact: Secure Messaging or In Basket Message

## 2025-01-28 ENCOUNTER — LAB VISIT (OUTPATIENT)
Dept: LAB | Facility: HOSPITAL | Age: 70
End: 2025-01-28
Attending: INTERNAL MEDICINE
Payer: MEDICARE

## 2025-01-28 ENCOUNTER — OFFICE VISIT (OUTPATIENT)
Dept: INTERNAL MEDICINE | Facility: CLINIC | Age: 70
End: 2025-01-28
Payer: MEDICARE

## 2025-01-28 VITALS
HEIGHT: 72 IN | OXYGEN SATURATION: 98 % | HEART RATE: 52 BPM | TEMPERATURE: 97 F | BODY MASS INDEX: 29.07 KG/M2 | WEIGHT: 214.63 LBS | DIASTOLIC BLOOD PRESSURE: 60 MMHG | SYSTOLIC BLOOD PRESSURE: 132 MMHG

## 2025-01-28 DIAGNOSIS — E11.9 DM TYPE 2 WITHOUT RETINOPATHY: Chronic | ICD-10-CM

## 2025-01-28 DIAGNOSIS — I48.0 PAROXYSMAL ATRIAL FIBRILLATION: Primary | Chronic | ICD-10-CM

## 2025-01-28 DIAGNOSIS — I25.10 CORONARY ARTERY DISEASE INVOLVING NATIVE CORONARY ARTERY OF NATIVE HEART WITHOUT ANGINA PECTORIS: ICD-10-CM

## 2025-01-28 DIAGNOSIS — E78.5 HYPERLIPIDEMIA ASSOCIATED WITH TYPE 2 DIABETES MELLITUS: ICD-10-CM

## 2025-01-28 DIAGNOSIS — E11.9 TYPE 2 DIABETES MELLITUS WITHOUT COMPLICATION, WITHOUT LONG-TERM CURRENT USE OF INSULIN: Chronic | ICD-10-CM

## 2025-01-28 DIAGNOSIS — I50.20 HFREF (HEART FAILURE WITH REDUCED EJECTION FRACTION): ICD-10-CM

## 2025-01-28 DIAGNOSIS — E11.36 DIABETES MELLITUS WITH CATARACT: Chronic | ICD-10-CM

## 2025-01-28 DIAGNOSIS — I10 ESSENTIAL HYPERTENSION: ICD-10-CM

## 2025-01-28 DIAGNOSIS — I48.0 PAROXYSMAL ATRIAL FIBRILLATION: ICD-10-CM

## 2025-01-28 DIAGNOSIS — R06.09 DOE (DYSPNEA ON EXERTION): ICD-10-CM

## 2025-01-28 DIAGNOSIS — E11.69 HYPERLIPIDEMIA ASSOCIATED WITH TYPE 2 DIABETES MELLITUS: ICD-10-CM

## 2025-01-28 DIAGNOSIS — R07.89 CHEST TIGHTNESS: ICD-10-CM

## 2025-01-28 DIAGNOSIS — E11.9 TYPE 2 DIABETES MELLITUS WITHOUT COMPLICATION, WITHOUT LONG-TERM CURRENT USE OF INSULIN: Primary | Chronic | ICD-10-CM

## 2025-01-28 DIAGNOSIS — I70.0 AORTIC ATHEROSCLEROSIS: ICD-10-CM

## 2025-01-28 LAB
ALBUMIN SERPL BCP-MCNC: 3.9 G/DL (ref 3.5–5.2)
ALP SERPL-CCNC: 70 U/L (ref 40–150)
ALT SERPL W/O P-5'-P-CCNC: 21 U/L (ref 10–44)
ANION GAP SERPL CALC-SCNC: 8 MMOL/L (ref 8–16)
AST SERPL-CCNC: 21 U/L (ref 10–40)
BILIRUB SERPL-MCNC: 0.5 MG/DL (ref 0.1–1)
BUN SERPL-MCNC: 17 MG/DL (ref 8–23)
CALCIUM SERPL-MCNC: 8.8 MG/DL (ref 8.7–10.5)
CHLORIDE SERPL-SCNC: 107 MMOL/L (ref 95–110)
CO2 SERPL-SCNC: 28 MMOL/L (ref 23–29)
CREAT SERPL-MCNC: 1.2 MG/DL (ref 0.5–1.4)
EST. GFR  (NO RACE VARIABLE): >60 ML/MIN/1.73 M^2
ESTIMATED AVG GLUCOSE: 160 MG/DL (ref 68–131)
GLUCOSE SERPL-MCNC: 173 MG/DL (ref 70–110)
HBA1C MFR BLD: 7.2 % (ref 4–5.6)
OHS QRS DURATION: 100 MS
OHS QTC CALCULATION: 408 MS
POTASSIUM SERPL-SCNC: 3.8 MMOL/L (ref 3.5–5.1)
PROT SERPL-MCNC: 7 G/DL (ref 6–8.4)
SODIUM SERPL-SCNC: 143 MMOL/L (ref 136–145)

## 2025-01-28 PROCEDURE — 93010 ELECTROCARDIOGRAM REPORT: CPT | Mod: S$PBB,,, | Performed by: INTERNAL MEDICINE

## 2025-01-28 PROCEDURE — 99999 PR PBB SHADOW E&M-EST. PATIENT-LVL IV: CPT | Mod: PBBFAC,,, | Performed by: INTERNAL MEDICINE

## 2025-01-28 PROCEDURE — G2211 COMPLEX E/M VISIT ADD ON: HCPCS | Mod: S$PBB,,, | Performed by: INTERNAL MEDICINE

## 2025-01-28 PROCEDURE — 36415 COLL VENOUS BLD VENIPUNCTURE: CPT | Mod: PO | Performed by: INTERNAL MEDICINE

## 2025-01-28 PROCEDURE — 80053 COMPREHEN METABOLIC PANEL: CPT | Performed by: INTERNAL MEDICINE

## 2025-01-28 PROCEDURE — 83036 HEMOGLOBIN GLYCOSYLATED A1C: CPT | Performed by: INTERNAL MEDICINE

## 2025-01-28 PROCEDURE — 99214 OFFICE O/P EST MOD 30 MIN: CPT | Mod: S$PBB,,, | Performed by: INTERNAL MEDICINE

## 2025-01-28 PROCEDURE — 99214 OFFICE O/P EST MOD 30 MIN: CPT | Mod: PBBFAC,PO,25 | Performed by: INTERNAL MEDICINE

## 2025-01-28 PROCEDURE — 93005 ELECTROCARDIOGRAM TRACING: CPT | Mod: PBBFAC,PO | Performed by: INTERNAL MEDICINE

## 2025-01-28 NOTE — PROGRESS NOTES
Patient wishes to switch from warfarin to Xarelto. INR 2.5 yesterday. Instruct patient to stop warfarin tonight and begin Xarelto 20 mg tomorrow w food

## 2025-01-28 NOTE — PROGRESS NOTES
Assessment:       1. Type 2 diabetes mellitus without complication, without long-term current use of insulin  - Comprehensive Metabolic Panel; Future  - Hemoglobin A1C; Future    2. DM type 2 without retinopathy    3. Diabetes mellitus with cataract    4. Essential hypertension    5. Hyperlipidemia associated with type 2 diabetes mellitus    6. Aortic atherosclerosis    7. Coronary artery disease involving native coronary artery of native heart without angina pectoris    8. Paroxysmal atrial fibrillation    9. HFrEF (heart failure with reduced ejection fraction)    10. CUELLAR (dyspnea on exertion)  - IN OFFICE EKG 12-LEAD (to Muse)  - Stress Echo Which stress agent will be used? Treadmill Exercise; Color Flow Doppler? No; Future    11. Chest tightness  - IN OFFICE EKG 12-LEAD (to Muse)  - Stress Echo Which stress agent will be used? Treadmill Exercise; Color Flow Doppler? No; Future        Plan:       1/2/3.  Diet and exercise control.    4.  Continue losartan 25 mg, Toprol-XL 25 mg, HCTZ 25 mg.    5/6.  Continue Crestor 20 mg.  7.  Continue Crestor 20 mg.   8.  Continue Toprol-XL 25 mg.  9.  Continue losartan 25 mg, Toprol-XL 25 mg.  10/11.  Check EKG, exercise stress echo.    Assessment & Plan    IMPRESSION:  1. Evaluated diabetes control: A1C trending up, considering metformin if A1C continues to rise  2. Assessed cardiovascular status: Ordered stress test due to reported chest tightness and fatigue  3. Noted 20-pound weight gain from previous year, potentially impacting diabetes management  4. Reviewed hypertension management: Blood pressure acceptable in office  5. Evaluated atrial fibrillation control: Metoprolol appears effective  6. Examined skin: No concerning lesions noted    HFREF (HEART FAILURE WITH REDUCED EJECTION FRACTION):   Continue losartan 25 mg and metoprolol XL 25 mg for heart failure.   Noted patient's reports of increased shortness of breath, fatigue, and chest tightness during walking.    Evaluated patient's condition as heart failure with preserved EF.   Ordered a stress test to evaluate heart function and an EKG.    TYPE 2 DIABETES MELLITUS WITH DIABETIC CHRONIC KIDNEY DISEASE:   Ordered A1C test.   Last A1c was 6.8 in October.   Mr. Person reports regular blood sugar monitoring, with recent readings in the 120s and 140s.   Evaluated diabetes as partially controlled with some blood sugar readings in target range and some elevated.   Noted weight gain from 198 lbs last year to 214 lbs now.   Discussed potential need for metformin if A1c continues to trend up.   Explained potential side effects of metformin (nausea, vomiting, upset stomach, diarrhea) and discussed gradual dose increase to improve tolerability if started.   Advised to continue diet and exercise control for diabetes management, with emphasis on watching food intake due to weight gain.    HYPERTENSION:   Instructed patient to resume regular blood pressure checks at home, a few times per week.   Continued HCT 25 mg for hypertension.   Noted blood pressure was within acceptable range during the visit.    HYPERLIPIDEMIA:   Continued Crestor 20 mg for hyperlipidemia.    CORONARY ARTERY DISEASE:   Continued Crestor 20 mg for coronary artery disease.   Recorded previous angiogram showing 30% blockage in LAD times two.    PAROXYSMAL ATRIAL FIBRILLATION:   Mr. Person reports no issues with heart racing due to atrial fibrillation.    DERMATOLOGY REFERRAL:   Noted patient's report of dry spots on skin.   Observed dry, flaky skin without any overly concerning features.   Referred patient to dermatologist Dr. Ellsworth for evaluation.    FOLLOW UP:   Follow up in 6 months.   Lab work on 5th floor.                   This note was generated with the assistance of ambient listening technology. Verbal consent was obtained by the patient and accompanying visitor(s) for the recording of patient appointment to facilitate this note. I attest to having reviewed and  edited the generated note for accuracy, though some syntax or spelling errors may persist. Please contact the author of this note for any clarification.       Subjective:       Patient ID: Lukasz Person is a 69 y.o. male.    Chief Complaint: Follow-up    HPI    69-year-old male here for follow-up.    Patient has diabetes that is diet and exercise controlled  Lab Results   Component Value Date    HGBA1C 6.8 (H) 10/11/2024    HGBA1C 6.8 (H) 04/01/2024    HGBA1C 6.2 (H) 01/08/2024     Lab Results   Component Value Date    LDLCALC 37.0 (L) 01/10/2025    CREATININE 1.1 01/10/2025     Patient has hypertension on losartan 25 mg, Toprol-XL 25 mg, HCTZ 25 mg.    Patient has hyperlipidemia on Crestor 20 mg.    Patient has coronary artery disease on losartan 25 mg, Crestor 20 mg.    Patient has paroxysmal atrial fibrillation on Toprol-XL 25 mg.    Patient has heart failure with preserved ejection fraction on losartan 25 mg, Toprol-XL 25 mg.    History of Present Illness    CHIEF COMPLAINT:  Mr. Person presents today for follow-up.    CARDIOVASCULAR:  He reports increased shortness of breath and fatigue. He experiences chest tightness with walking that resolves after 3-5 minutes of rest. Previous angiogram showed 30% blockages in LAD in two locations.    DIABETES:  He reports concern about increasing blood sugar with home glucose readings ranging in 120s-140s. A1c was 6.8 in October. He has history of metformin use which was discontinued following cancer diagnosis and associated weight loss.    DERMATOLOGIC:  He reports dry, flaky spots on his skin and has scheduled a dermatology appointment with Dr. Ellsworth for evaluation.      ROS:  Constitutional: +fatigue  Respiratory: +shortness of breath  Cardiovascular: +chest tightness  Integumentary: +dry skin         Review of Systems          Objective:      Physical Exam  Vitals reviewed.   Constitutional:       Appearance: He is well-developed.   HENT:      Head: Normocephalic and  atraumatic.      Mouth/Throat:      Pharynx: No oropharyngeal exudate.   Eyes:      General: No scleral icterus.        Right eye: No discharge.         Left eye: No discharge.      Pupils: Pupils are equal, round, and reactive to light.   Neck:      Thyroid: No thyromegaly.      Trachea: No tracheal deviation.   Cardiovascular:      Rate and Rhythm: Normal rate and regular rhythm.      Heart sounds: Normal heart sounds. No murmur heard.     No friction rub. No gallop.   Pulmonary:      Effort: Pulmonary effort is normal. No respiratory distress.      Breath sounds: Normal breath sounds. No wheezing or rales.   Chest:      Chest wall: No tenderness.   Abdominal:      General: Bowel sounds are normal. There is no distension.      Palpations: Abdomen is soft. There is no mass.      Tenderness: There is no abdominal tenderness. There is no guarding or rebound.   Musculoskeletal:         General: No tenderness. Normal range of motion.      Cervical back: Normal range of motion and neck supple.   Skin:     General: Skin is warm and dry.      Coloration: Skin is not pale.      Findings: No erythema or rash.   Neurological:      Mental Status: He is alert and oriented to person, place, and time.   Psychiatric:         Behavior: Behavior normal.

## 2025-01-29 ENCOUNTER — OFFICE VISIT (OUTPATIENT)
Dept: DERMATOLOGY | Facility: CLINIC | Age: 70
End: 2025-01-29
Payer: MEDICARE

## 2025-01-29 DIAGNOSIS — L30.0 NUMMULAR ECZEMATOUS DERMATITIS: ICD-10-CM

## 2025-01-29 DIAGNOSIS — D18.01 CHERRY ANGIOMA: ICD-10-CM

## 2025-01-29 DIAGNOSIS — L81.4 LENTIGINES: ICD-10-CM

## 2025-01-29 DIAGNOSIS — L57.0 MULTIPLE ACTINIC KERATOSES: Primary | ICD-10-CM

## 2025-01-29 PROCEDURE — 17003 DESTRUCT PREMALG LES 2-14: CPT | Mod: PBBFAC,PO | Performed by: DERMATOLOGY

## 2025-01-29 PROCEDURE — 17000 DESTRUCT PREMALG LESION: CPT | Mod: PBBFAC,PO | Performed by: DERMATOLOGY

## 2025-01-29 PROCEDURE — 99999 PR PBB SHADOW E&M-EST. PATIENT-LVL III: CPT | Mod: PBBFAC,,, | Performed by: DERMATOLOGY

## 2025-01-29 PROCEDURE — 99213 OFFICE O/P EST LOW 20 MIN: CPT | Mod: PBBFAC,PO | Performed by: DERMATOLOGY

## 2025-01-29 NOTE — PROGRESS NOTES
Subjective:      Patient ID:  Lukasz Person is a 69 y.o. male who presents for   Chief Complaint   Patient presents with    Skin Check     UBSE     Would like skin check, spots on right arm and on scalp,  also a spot on his right abdomen which itches sometimes and a patch of dry skin on his right medial ankle.         Review of Systems   Constitutional:  Negative for fever, chills, weight loss, weight gain, fatigue and malaise.   Skin:  Positive for daily sunscreen use. Negative for activity-related sunscreen use and wears hat.   Hematologic/Lymphatic: Does not bruise/bleed easily.       Objective:   Physical Exam   Constitutional: He appears well-developed and well-nourished. No distress.   Neurological: He is alert and oriented to person, place, and time. He is not disoriented.   Psychiatric: He has a normal mood and affect.   Skin:   Areas Examined (abnormalities noted in diagram):   Head / Face Inspection Performed  Neck Inspection Performed  Chest / Axilla Inspection Performed  RUE Inspected  LUE Inspection Performed  Nails and Digits Inspection Performed                 Diagram Legend     Erythematous scaling macule/papule c/w actinic keratosis       Vascular papule c/w angioma      Pigmented verrucoid papule/plaque c/w seborrheic keratosis      Yellow umbilicated papule c/w sebaceous hyperplasia      Irregularly shaped tan macule c/w lentigo     1-2 mm smooth white papules consistent with Milia      Movable subcutaneous cyst with punctum c/w epidermal inclusion cyst      Subcutaneous movable cyst c/w pilar cyst      Firm pink to brown papule c/w dermatofibroma      Pedunculated fleshy papule(s) c/w skin tag(s)      Evenly pigmented macule c/w junctional nevus     Mildly variegated pigmented, slightly irregular-bordered macule c/w mildly atypical nevus      Flesh colored to evenly pigmented papule c/w intradermal nevus       Pink pearly papule/plaque c/w basal cell carcinoma      Erythematous hyperkeratotic  "cursted plaque c/w SCC      Surgical scar with no sign of skin cancer recurrence      Open and closed comedones      Inflammatory papules and pustules      Verrucoid papule consistent consistent with wart     Erythematous eczematous patches and plaques     Dystrophic onycholytic nail with subungual debris c/w onychomycosis     Umbilicated papule    Erythematous-base heme-crusted tan verrucoid plaque consistent with inflamed seborrheic keratosis     Erythematous Silvery Scaling Plaque c/w Psoriasis     See annotation      Assessment / Plan:        Multiple actinic keratoses   Cryosurgery Procedure Note    Verbal consent from the patient is obtained and the patient is aware of the precancerous quality and need for treatment of these lesions. Liquid nitrogen cryosurgery is applied to the 2 actinic keratoses, as detailed in the physical exam, to produce a freeze injury.      Nummular eczematous dermatitis  No hot water  Neutrogena gel cream after showers    Cherry angioma   reassurance   Dermeleve lotion prn itching    Lentigines  The "ABCD" rules to observe pigmented lesions were reviewed.               Follow up in about 6 months (around 7/29/2025).  "

## 2025-01-31 ENCOUNTER — HOSPITAL ENCOUNTER (OUTPATIENT)
Dept: CARDIOLOGY | Facility: HOSPITAL | Age: 70
Discharge: HOME | End: 2025-01-31
Attending: INTERNAL MEDICINE
Payer: MEDICARE

## 2025-01-31 ENCOUNTER — PATIENT MESSAGE (OUTPATIENT)
Dept: INTERNAL MEDICINE | Facility: CLINIC | Age: 70
End: 2025-01-31
Payer: MEDICARE

## 2025-01-31 VITALS
SYSTOLIC BLOOD PRESSURE: 146 MMHG | WEIGHT: 214 LBS | HEART RATE: 49 BPM | DIASTOLIC BLOOD PRESSURE: 76 MMHG | BODY MASS INDEX: 29.96 KG/M2 | HEIGHT: 71 IN

## 2025-01-31 DIAGNOSIS — R06.09 DOE (DYSPNEA ON EXERTION): ICD-10-CM

## 2025-01-31 DIAGNOSIS — R07.89 CHEST TIGHTNESS: ICD-10-CM

## 2025-01-31 LAB
ASCENDING AORTA: 3.4 CM
AV AREA BY CONTINUOUS VTI: 1.4 CM2
AV INDEX (PROSTH): 0.37
AV LVOT MEAN GRADIENT: 1 MMHG
AV LVOT PEAK GRADIENT: 2 MMHG
AV MEAN GRADIENT: 11 MMHG
AV PEAK GRADIENT: 18 MMHG
AV VALVE AREA BY VELOCITY RATIO: 1.4 CM²
AV VALVE AREA: 1.4 CM2
AV VELOCITY RATIO: 0.38
BSA FOR ECHO PROCEDURE: 2.21 M2
CV ECHO LV RWT: 0.56 CM
CV STRESS BASE HR: 49 BPM
DIASTOLIC BLOOD PRESSURE: 76 MMHG
DOP CALC AO PEAK VEL: 2.1 M/S
DOP CALC AO VTI: 54.2 CM
DOP CALC LVOT AREA: 3.8 CM2
DOP CALC LVOT DIAMETER: 2.2 CM
DOP CALC LVOT PEAK VEL: 0.8 M/S
DOP CALC LVOT STROKE VOLUME: 75.6 CM3
DOP CALCLVOT PEAK VEL VTI: 19.9 CM
E WAVE DECELERATION TIME: 216 MS
E/A RATIO: 1.65
E/E' RATIO: 9 M/S
ECHO EF ESTIMATED: 59 %
ECHO LV POSTERIOR WALL: 1.2 CM (ref 0.6–1.1)
FRACTIONAL SHORTENING: 30.2 % (ref 28–44)
INTERVENTRICULAR SEPTUM: 1.2 CM (ref 0.6–1.1)
LA MAJOR: 5.5 CM
LA MINOR: 5.2 CM
LA WIDTH: 5.6 CM
LEFT ATRIUM SIZE: 4.2 CM
LEFT ATRIUM VOLUME INDEX MOD: 44 ML/M2
LEFT ATRIUM VOLUME INDEX: 49 ML/M2
LEFT ATRIUM VOLUME MOD: 96 ML
LEFT ATRIUM VOLUME: 107 CM3
LEFT INTERNAL DIMENSION IN SYSTOLE: 3 CM (ref 2.1–4)
LEFT VENTRICLE DIASTOLIC VOLUME INDEX: 38.03 ML/M2
LEFT VENTRICLE DIASTOLIC VOLUME: 82.53 ML
LEFT VENTRICLE MASS INDEX: 85.1 G/M2
LEFT VENTRICLE SYSTOLIC VOLUME INDEX: 15.7 ML/M2
LEFT VENTRICLE SYSTOLIC VOLUME: 34.08 ML
LEFT VENTRICULAR INTERNAL DIMENSION IN DIASTOLE: 4.3 CM (ref 3.5–6)
LEFT VENTRICULAR MASS: 184.7 G
LV LATERAL E/E' RATIO: 7.8
LV SEPTAL E/E' RATIO: 9.6
MV PEAK A VEL: 0.52 M/S
MV PEAK E VEL: 0.86 M/S
OHS CV CPX 1 MINUTE RECOVERY HEART RATE: 112 BPM
OHS CV CPX 85 PERCENT MAX PREDICTED HEART RATE MALE: 128
OHS CV CPX ESTIMATED METS: 11
OHS CV CPX MAX PREDICTED HEART RATE: 150
OHS CV CPX PATIENT IS FEMALE: 0
OHS CV CPX PATIENT IS MALE: 1
OHS CV CPX PEAK DIASTOLIC BLOOD PRESSURE: 84 MMHG
OHS CV CPX PEAK HEAR RATE: 131 BPM
OHS CV CPX PEAK RATE PRESSURE PRODUCT: ABNORMAL
OHS CV CPX PEAK SYSTOLIC BLOOD PRESSURE: 156 MMHG
OHS CV CPX PERCENT MAX PREDICTED HEART RATE ACHIEVED: 87
OHS CV CPX RATE PRESSURE PRODUCT PRESENTING: 7154
OHS CV RV/LV RATIO: 1.19 CM
RA MAJOR: 5.28 CM
RA PRESSURE ESTIMATED: 3 MMHG
RA WIDTH: 4.37 CM
RIGHT ATRIAL AREA: 22.5 CM2
RIGHT VENTRICLE DIASTOLIC BASEL DIMENSION: 5.1 CM
RV TISSUE DOPPLER FREE WALL SYSTOLIC VELOCITY 1 (APICAL 4 CHAMBER VIEW): 15.58 CM/S
SINUS: 3.03 CM
STJ: 2.84 CM
STRESS ECHO POST EXERCISE DUR MIN: 6 MINUTES
STRESS ECHO POST EXERCISE DUR SEC: 34 SECONDS
STRESS ST DEPRESSION: 0.5 MM
SYSTOLIC BLOOD PRESSURE: 146 MMHG
TDI LATERAL: 0.11 M/S
TDI SEPTAL: 0.09 M/S
TDI: 0.1 M/S
TRICUSPID ANNULAR PLANE SYSTOLIC EXCURSION: 3.06 CM
Z-SCORE OF LEFT VENTRICULAR DIMENSION IN END DIASTOLE: -5.13
Z-SCORE OF LEFT VENTRICULAR DIMENSION IN END SYSTOLE: -2.97

## 2025-01-31 PROCEDURE — 93351 STRESS TTE COMPLETE: CPT

## 2025-01-31 PROCEDURE — 93351 STRESS TTE COMPLETE: CPT | Mod: 26,,, | Performed by: INTERNAL MEDICINE

## 2025-02-03 ENCOUNTER — PATIENT MESSAGE (OUTPATIENT)
Dept: INTERNAL MEDICINE | Facility: CLINIC | Age: 70
End: 2025-02-03
Payer: MEDICARE

## 2025-02-03 NOTE — TELEPHONE ENCOUNTER
The shortness of breath he is feeling is likely from deconditioning.  Please encouraged patient to try to start exercising at least 5 days a week 30 minutes a day as a goal.  Should start with once or twice a week for 30 minutes.

## 2025-02-03 NOTE — TELEPHONE ENCOUNTER
Sent pt a message in [employer] re: sob likely from deconditioning; start with exercising 1-2x /wk x30 mins & work up to 5 days/wk x30 mins

## 2025-02-14 NOTE — TELEPHONE ENCOUNTER
[place] pt last seen. 1/30/2025, next appt 7/30/24   last filled 1/8/2025   Okay to hold Xarelto for 2 days prior to scope.

## 2025-02-28 ENCOUNTER — PATIENT MESSAGE (OUTPATIENT)
Dept: PODIATRY | Facility: CLINIC | Age: 70
End: 2025-02-28
Payer: MEDICARE

## 2025-03-03 ENCOUNTER — TELEPHONE (OUTPATIENT)
Dept: PODIATRY | Facility: CLINIC | Age: 70
End: 2025-03-03
Payer: MEDICARE

## 2025-03-03 NOTE — TELEPHONE ENCOUNTER
I spoke with patient . This is a confirmation of your upcoming appointment with our podiatry department. We look forward to seeing you on 3/5  at 3 pm  with Dr. Lopez [address] [place] [address] [address] . Please park behind the second grey building.      If you have any questions or need further assistance, please do not hesitate to contact our office at [phone].  
verbal cues

## 2025-03-05 ENCOUNTER — OFFICE VISIT (OUTPATIENT)
Dept: PODIATRY | Facility: CLINIC | Age: 70
End: 2025-03-05
Payer: MEDICARE

## 2025-03-05 VITALS
WEIGHT: 213.88 LBS | HEIGHT: 71 IN | BODY MASS INDEX: 29.94 KG/M2 | DIASTOLIC BLOOD PRESSURE: 79 MMHG | HEART RATE: 45 BPM | SYSTOLIC BLOOD PRESSURE: 157 MMHG | RESPIRATION RATE: 18 BRPM

## 2025-03-05 DIAGNOSIS — R60.0 BILATERAL LOWER EXTREMITY EDEMA: ICD-10-CM

## 2025-03-05 DIAGNOSIS — D84.81 IMMUNODEFICIENCY SECONDARY TO NEOPLASM: ICD-10-CM

## 2025-03-05 DIAGNOSIS — D49.9 IMMUNODEFICIENCY SECONDARY TO NEOPLASM: ICD-10-CM

## 2025-03-05 DIAGNOSIS — E11.42 DIABETIC POLYNEUROPATHY ASSOCIATED WITH TYPE 2 DIABETES MELLITUS: Primary | ICD-10-CM

## 2025-03-05 DIAGNOSIS — L30.9 DERMATITIS: ICD-10-CM

## 2025-03-05 DIAGNOSIS — E11.9 ENCOUNTER FOR DIABETIC FOOT EXAM: ICD-10-CM

## 2025-03-05 PROCEDURE — 99214 OFFICE O/P EST MOD 30 MIN: CPT | Mod: PBBFAC,PN | Performed by: PODIATRIST

## 2025-03-05 PROCEDURE — 99999 PR PBB SHADOW E&M-EST. PATIENT-LVL IV: CPT | Mod: PBBFAC,,, | Performed by: PODIATRIST

## 2025-03-05 PROCEDURE — 99214 OFFICE O/P EST MOD 30 MIN: CPT | Mod: S$PBB,,, | Performed by: PODIATRIST

## 2025-03-05 NOTE — PROGRESS NOTES
Subjective:      Patient ID: Lukasz Person is a 69 y.o. male.    Chief Complaint:   Diabetic Foot Exam (Kirk Wilson MD at 1/28/2025) and Nail Care    Lukasz is a 69 y.o. male who returns to the clinic or evaluation and treatment of diabetic feet. Lukasz has a past medical history of Anticoagulant long-term use, Cancer, Diabetes mellitus, Hyperlipidemia, Hypertension, Kidney stones, and Sleep apnea.      Pt doing good. Some cracking to middle of bottom foot right . Using okeefs cream      PCP: Kirk Wilson MD    Date Last Seen by PCP: 1/28/25    Current shoe gear: Extra depth shoes      Hemoglobin A1C   Date Value Ref Range Status   01/28/2025 7.2 (H) 4.0 - 5.6 % Final     Comment:     ADA Screening Guidelines:  5.7-6.4%  Consistent with prediabetes  >or=6.5%  Consistent with diabetes    High levels of fetal hemoglobin interfere with the HbA1C  assay. Heterozygous hemoglobin variants (HbS, HgC, etc)do  not significantly interfere with this assay.   However, presence of multiple variants may affect accuracy.     10/11/2024 6.8 (H) 4.0 - 5.6 % Final     Comment:     ADA Screening Guidelines:  5.7-6.4%  Consistent with prediabetes  >or=6.5%  Consistent with diabetes    High levels of fetal hemoglobin interfere with the HbA1C  assay. Heterozygous hemoglobin variants (HbS, HgC, etc)do  not significantly interfere with this assay.   However, presence of multiple variants may affect accuracy.     04/01/2024 6.8 (H) 4.0 - 5.6 % Final     Comment:     ADA Screening Guidelines:  5.7-6.4%  Consistent with prediabetes  >or=6.5%  Consistent with diabetes    High levels of fetal hemoglobin interfere with the HbA1C  assay. Heterozygous hemoglobin variants (HbS, HgC, etc)do  not significantly interfere with this assay.   However, presence of multiple variants may affect accuracy.            Past Medical History:   Diagnosis Date    Anticoagulant long-term use     Cancer     Diabetes mellitus     Onset late 50s/early 60s     Hyperlipidemia     Hypertension     Onset late 50s/early 60s    Kidney stones     Sleep apnea     since 2006     Past Surgical History:   Procedure Laterality Date    COLONOSCOPY N/A 10/26/2023    Procedure: COLONOSCOPY;  Surgeon: Noah Duong MD;  Location: [facility] (4TH FLR);  Service: Endoscopy;  Laterality: N/A;  instructions sent to patient portal. Tb  referral: Dr. Wilson  Pt. on Xarelto--tb-ok to hold see te 8/15/23 dr vu  10/13-precall complete-MS  10/19 pt rescheduled, Xarelto hold, PEG, portal -ml  10/24-precall complete-KPvt    CORONARY ANGIOGRAPHY N/A 9/30/2020    Procedure: ANGIOGRAM, CORONARY ARTERY;  Surgeon: John West MD;  Location: [facility] CATH LAB;  Service: Cardiology;  Laterality: N/A;    CYSTOSCOPY N/A 2/17/2022    Procedure: CYSTOSCOPY;  Surgeon: Lukasz Hughes MD;  Location: [facility] OR 1ST FLR;  Service: Urology;  Laterality: N/A;    CYSTOSCOPY W/ URETERAL STENT PLACEMENT N/A 2/25/2022    Procedure: CYSTOSCOPY, WITH URETERAL STENT INSERTION;  Surgeon: Lukasz Hughes MD;  Location: [facility] OR 1ST FLR;  Service: Urology;  Laterality: N/A;    ENDOSCOPIC ULTRASOUND OF UPPER GASTROINTESTINAL TRACT N/A 12/7/2022    Procedure: ULTRASOUND, UPPER GI TRACT, ENDOSCOPIC;  Surgeon: Darian Main MD;  Location: [facility];  Service: Endoscopy;  Laterality: N/A;  Approval to hold Xarelto rec'd from Dr. Vu (see t/e 12/5/22)-DS    ESOPHAGOGASTRODUODENOSCOPY N/A 11/17/2022    Procedure: EGD (ESOPHAGOGASTRODUODENOSCOPY);  Surgeon: Brody Gonzales MD;  Location: [facility] (2ND FLR);  Service: Endoscopy;  Laterality: N/A;  inst via email-ok to hold Xarelto x 2 days-MS    ESOPHAGOGASTRODUODENOSCOPY N/A 5/31/2023    Procedure: EGD (ESOPHAGOGASTRODUODENOSCOPY) WITH DILATION;  Surgeon: Santana Brown Jr., MD;  Location: [facility] OR 2ND FLR;  Service: General;  Laterality: N/A;    ESOPHAGOGASTRODUODENOSCOPY N/A 5/3/2024    Procedure: EGD (ESOPHAGOGASTRODUODENOSCOPY) with botox injection;   Surgeon: Santana Brown Jr., MD;  [facility];  Service: General;  Laterality: N/A;    INJECTION OF BOTULINUM TOXIN TYPE A  5/3/2024    Procedure: INJECTION, BOTULINUM TOXIN, TYPE A;  Surgeon: Santana Brown Jr., MD;  Location: [facility] OR 2ND FLR;  Service: General;;    LASER LITHOTRIPSY Left 2/25/2022    Procedure: LITHOTRIPSY, USING LASER;  Surgeon: Lukasz Hughes MD;  [facility];  Service: Urology;  Laterality: Left;    LEFT HEART CATHETERIZATION Left 9/30/2020    Procedure: Left heart cath;  Surgeon: John West MD;  Location: [facility] CATH LAB;  Service: Cardiology;  Laterality: Left;    LITHOTRIPSY      PARATHYROIDECTOMY  1/1/2-107    PYELOSCOPY Left 2/25/2022    Procedure: PYELOSCOPY;  Surgeon: Lukasz Hughes MD;  Location: [facility] OR [address] [place] [address];  Service: Urology;  Laterality: Left;    ROBOT-ASSISTED SURGICAL REMOVAL OF ESOPHAGUS USING DA CARLOS XI N/A 3/14/2023    Procedure: XI ROBOTIC ESOPHAGECTOMY;  Surgeon: Santana Brown Jr., MD;  [facility];  Service: General;  Laterality: N/A;  Abdomen, Chest and Neck    ROBOTIC JEJUNOSTOMY N/A 3/14/2023    Procedure: ROBOTIC JEJUNOSTOMY TUBE INSERTION;  Surgeon: Santana Brown Jr., MD;  Location: [facility] OR [address] [place] [address];  Service: General;  Laterality: N/A;    TRANSESOPHAGEAL ECHOCARDIOGRAPHY N/A 4/7/2022    Procedure: ECHOCARDIOGRAM, TRANSESOPHAGEAL;  Surgeon: Xander Diagnostic Provider;  Location: [facility] EP LAB;  Service: Cardiology;  Laterality: N/A;    TREATMENT OF CARDIAC ARRHYTHMIA N/A 4/7/2022    Procedure: Cardioversion or Defibrillation;  Surgeon: Iris Fisher NP;  Location: [facility] EP LAB;  Service: Cardiology;  Laterality: N/A;  afib, dccv, artie, anes, EH, 3prep    URETEROSCOPIC REMOVAL OF URETERIC CALCULUS Left 2/25/2022    Procedure: REMOVAL, CALCULUS, URETER, URETEROSCOPIC;  Surgeon: Lukasz Hughes MD;  [facility];  Service: Urology;  Laterality: Left;    URETEROSCOPY Left 2/25/2022     Procedure: URETEROSCOPY;  Surgeon: Lukasz Hughes MD;  Location: [facility] OR [address] [place] [address];  Service: Urology;  Laterality: Left;    VOCAL CORD INJECTION Left 3/17/2023    Procedure: INJECTION, VOCAL CORD, LARYNGOSCOPIC;  Surgeon: Gm Altman MD;  Location: [facility] OR 2ND [facility];  Service: ENT;  Laterality: Left;     Current Outpatient Medications on File Prior to Visit   Medication Sig Dispense Refill    allopurinoL (ZYLOPRIM) 100 MG tablet TAKE 1 TABLET BY MOUTH EVERY DAY 90 tablet 3    blood sugar diagnostic (ACCU-CHEK ANTONELLA PLUS TEST STRP) Strp Use to test CBG four times daily E11.65 400 strip 3    blood-glucose meter kit Checks blood sugars 1x/daily. 1 each 12    hydroCHLOROthiazide (HYDRODIURIL) 25 MG tablet TAKE 1 TABLET(25 MG) BY MOUTH EVERY DAY 30 tablet 11    lancets (ACCU-CHEK SOFTCLIX LANCETS) Misc USE 1 EVERY DAY OR AS DIRECTED 100 each 3    metoprolol succinate (TOPROL-XL) 25 MG 24 hr tablet Take 0.5 tablets (12.5 mg total) by mouth once daily. 45 tablet 2    nitroGLYCERIN (NITROSTAT) 0.4 MG SL tablet Place 1 tablet (0.4 mg total) under the tongue every 5 (five) minutes as needed for Chest pain. If you need a third tablet, call 911 100 tablet 3    omeprazole (PRILOSEC) 40 MG capsule TAKE 1 CAPSULE(40 MG) BY MOUTH EVERY DAY 90 capsule 3    rivaroxaban (XARELTO) 20 mg Tab Take 1 tablet (20 mg total) by mouth daily with dinner or evening meal. 30 tablet 11    rosuvastatin (CRESTOR) 20 MG tablet TAKE 1 TABLET(20 MG) BY MOUTH EVERY EVENING 90 tablet 3    tamsulosin (FLOMAX) 0.4 mg Cap Take 1 capsule (0.4 mg total) by mouth once daily. 30 minutes after dinner 90 capsule 3    testosterone (ANDROGEL) 20.25 mg/1.25 gram (1.62 %) GlPm Apply 4 pumps to shoulders daily 2 each 5    losartan (COZAAR) 25 MG tablet Take 1 tablet (25 mg total) by mouth 2 (two) times a day. 180 tablet 3     No current facility-administered medications on file prior to visit.     Review of patient's allergies indicates:  No Known  "Allergies    Review of Systems   Constitutional: Negative for chills, decreased appetite, fever, malaise/fatigue, night sweats, weight gain and weight loss.   Cardiovascular:  Negative for chest pain, claudication, dyspnea on exertion, leg swelling, palpitations and syncope.   Respiratory:  Negative for cough and shortness of breath.    Endocrine: Negative for cold intolerance and heat intolerance.   Hematologic/Lymphatic: Negative for bleeding problem. Does not bruise/bleed easily.   Skin:  Positive for dry skin and nail changes. Negative for color change, flushing, itching, poor wound healing, rash, skin cancer, suspicious lesions and unusual hair distribution.   Musculoskeletal:  Positive for arthritis and stiffness. Negative for back pain, falls, gout, joint pain, joint swelling, muscle cramps, muscle weakness, myalgias and neck pain.   Gastrointestinal:  Negative for diarrhea, nausea and vomiting.   Neurological:  Positive for numbness and paresthesias. Negative for dizziness, focal weakness, light-headedness, tremors, vertigo and weakness.   Psychiatric/Behavioral:  Negative for altered mental status and depression. The patient does not have insomnia.    Allergic/Immunologic: Negative.            Objective:       Vitals:    03/05/25 1459   BP: (!) 157/79   Pulse: (!) 45   Resp: 18   Weight: 97 kg (213 lb 13.5 oz)   Height: 5' 11" (1.803 m)   PainSc: 0-No pain   97 kg (213 lb 13.5 oz)     Physical Exam  Vitals reviewed.   Constitutional:       General: He is not in acute distress.     Appearance: He is well-developed. He is not ill-appearing, toxic-appearing or diaphoretic.      Comments: Proper supportive shoegear   Cardiovascular:      Pulses:           Dorsalis pedis pulses are 2+ on the right side and 2+ on the left side.        Posterior tibial pulses are 1+ on the right side and 1+ on the left side.   Musculoskeletal:         General: No swelling or tenderness.      Right lower leg: No tenderness. No " edema.      Left lower leg: No tenderness. No edema.      Right ankle: Normal.      Right Achilles Tendon: Normal.      Left ankle: Normal.      Left Achilles Tendon: Normal.      Right foot: Decreased range of motion. Deformity, bunion and prominent metatarsal heads present. No tenderness or bony tenderness.      Left foot: Decreased range of motion. Deformity, bunion and prominent metatarsal heads present. No tenderness or bony tenderness.      Comments:  Flexible pes planus foot type w/ medial arch collapse and mild gastroc equinus       Reducible extensor and flexor contractures at the MTPJ and PIPJ of toes 2-5, bilat.          Feet:      Right foot:      Protective Sensation: 10 sites tested.  6 sites sensed.      Skin integrity: Dry skin present. No ulcer, blister, skin breakdown, erythema, warmth, callus or fissure.      Toenail Condition: Right toenails are abnormally thick and long. Fungal disease present.     Left foot:      Protective Sensation: 10 sites tested.  6 sites sensed.      Skin integrity: Dry skin and fissure present. No ulcer, blister, skin breakdown, erythema, warmth or callus.      Toenail Condition: Left toenails are abnormally thick and long. Fungal disease present.     Comments:   No open lesions    SWM decreased to digits and forefoot    Nails 1-10 thickened elongated discolored            + peeling skin  fissuring plantar right  / no soi   Skin:     General: Skin is warm and dry.      Capillary Refill: Capillary refill takes 2 to 3 seconds.      Coloration: Skin is not pale.      Findings: No erythema or rash.      Nails: There is no clubbing.   Neurological:      Mental Status: He is alert and oriented to person, place, and time.      Sensory: Sensory deficit present.      Gait: Gait abnormal.   Psychiatric:         Attention and Perception: Attention normal.         Mood and Affect: Mood normal.         Speech: Speech normal.         Behavior: Behavior normal.         Thought Content:  Thought content normal.         Cognition and Memory: Cognition normal.         Judgment: Judgment normal.               Assessment:       Encounter Diagnoses   Name Primary?    Diabetic polyneuropathy associated with type 2 diabetes mellitus Yes    Encounter for diabetic foot exam     Bilateral lower extremity edema     Immunodeficiency secondary to neoplasm     Dermatitis              Plan:       Lukasz was seen today for diabetic foot exam and nail care.    Diagnoses and all orders for this visit:    Diabetic polyneuropathy associated with type 2 diabetes mellitus    Encounter for diabetic foot exam    Bilateral lower extremity edema    Immunodeficiency secondary to neoplasm    Dermatitis          I counseled the patient on his conditions, their implications and medical management.     DM foot exam     - Shoe inspection. Diabetic Foot Education. Patient reminded of the importance of good nutrition and blood sugar control to help prevent podiatric complications of diabetes. Patient instructed on proper foot hygeine. We discussed wearing proper shoe gear, daily foot inspections, never walking without protective shoe gear, never putting sharp instruments to feet      With patient's permission, the toenails mentioned above were aggressively reduced and debrided using a nail nipper, removing all offending nail and debris.       Pt has triamcinalone at home  Recommend once a day for one week and then vasaline.     F/u 3 months sooner if need

## 2025-03-07 ENCOUNTER — ANTI-COAG VISIT (OUTPATIENT)
Dept: CARDIOLOGY | Facility: CLINIC | Age: 70
End: 2025-03-07
Payer: MEDICARE

## 2025-03-07 ENCOUNTER — PATIENT MESSAGE (OUTPATIENT)
Dept: CARDIOLOGY | Facility: CLINIC | Age: 70
End: 2025-03-07
Payer: MEDICARE

## 2025-03-07 DIAGNOSIS — I48.0 PAROXYSMAL ATRIAL FIBRILLATION: Primary | Chronic | ICD-10-CM

## 2025-03-24 ENCOUNTER — PATIENT MESSAGE (OUTPATIENT)
Dept: INTERNAL MEDICINE | Facility: CLINIC | Age: 70
End: 2025-03-24
Payer: MEDICARE

## 2025-03-24 DIAGNOSIS — I48.0 PAROXYSMAL ATRIAL FIBRILLATION: Chronic | ICD-10-CM

## 2025-03-25 ENCOUNTER — TELEPHONE (OUTPATIENT)
Dept: HEMATOLOGY/ONCOLOGY | Facility: CLINIC | Age: 70
End: 2025-03-25
Payer: MEDICARE

## 2025-03-25 NOTE — TELEPHONE ENCOUNTER
I called the pt and offered him a 1 day sooner apppt on 4-3 @ 8:3 am. The patient accepted the new appt date and time.

## 2025-03-26 NOTE — TELEPHONE ENCOUNTER
Refill Routing Note   Medication(s) are not appropriate for processing by [facility] for the following reason(s):        New or recently adjusted medication    [facility] action(s):  Defer               Appointments  past 12m or future 3m with PCP    Date Provider   Last Visit   11/21/2023 Pallavi Nick MD   Next Visit   Visit date not found Pallavi Nick MD   ED visits in past 90 days: 0        Note composed:10:55 AM 03/26/2025

## 2025-03-27 RX ORDER — RIVAROXABAN 20 MG/1
TABLET, FILM COATED ORAL
Qty: 30 TABLET | Refills: 11 | Status: SHIPPED | OUTPATIENT
Start: 2025-03-27

## 2025-04-02 ENCOUNTER — HOSPITAL ENCOUNTER (OUTPATIENT)
Dept: RADIOLOGY | Facility: HOSPITAL | Age: 70
Discharge: HOME | End: 2025-04-02
Payer: MEDICARE

## 2025-04-02 ENCOUNTER — INFUSION (OUTPATIENT)
Dept: INFUSION THERAPY | Facility: HOSPITAL | Age: 70
End: 2025-04-02
Payer: MEDICARE

## 2025-04-02 DIAGNOSIS — Z79.69 ON ANTINEOPLASTIC CHEMOTHERAPY: ICD-10-CM

## 2025-04-02 DIAGNOSIS — Z00.6 CLINICAL TRIAL PARTICIPANT: ICD-10-CM

## 2025-04-02 DIAGNOSIS — C15.9 ESOPHAGEAL ADENOCARCINOMA: Primary | ICD-10-CM

## 2025-04-02 DIAGNOSIS — C15.9 ESOPHAGEAL ADENOCARCINOMA: ICD-10-CM

## 2025-04-02 LAB
ABSOLUTE EOSINOPHIL (OHS): 0.12 K/UL
ABSOLUTE MONOCYTE (OHS): 0.41 K/UL (ref 0.3–1)
ABSOLUTE NEUTROPHIL COUNT (OHS): 3.42 K/UL (ref 1.8–7.7)
ALBUMIN SERPL BCP-MCNC: 3.7 G/DL (ref 3.5–5.2)
ALP SERPL-CCNC: 69 UNIT/L (ref 40–150)
ALT SERPL W/O P-5'-P-CCNC: 24 UNIT/L (ref 10–44)
ANION GAP (OHS): 8 MMOL/L (ref 8–16)
AST SERPL-CCNC: 22 UNIT/L (ref 11–45)
BASOPHILS # BLD AUTO: 0.05 K/UL
BASOPHILS NFR BLD AUTO: 1.1 %
BILIRUB DIRECT SERPL-MCNC: 0.2 MG/DL (ref 0.1–0.3)
BILIRUB SERPL-MCNC: 0.6 MG/DL (ref 0.1–1)
BUN SERPL-MCNC: 11 MG/DL (ref 8–23)
CALCIUM SERPL-MCNC: 8.5 MG/DL (ref 8.7–10.5)
CHLORIDE SERPL-SCNC: 109 MMOL/L (ref 95–110)
CO2 SERPL-SCNC: 25 MMOL/L (ref 23–29)
CORTIS SERPL-MCNC: 7.8 UG/DL
CREAT SERPL-MCNC: 1 MG/DL (ref 0.5–1.4)
ERYTHROCYTE [DISTWIDTH] IN BLOOD BY AUTOMATED COUNT: 13.6 % (ref 11.5–14.5)
GFR SERPLBLD CREATININE-BSD FMLA CKD-EPI: >60 ML/MIN/1.73/M2
GLUCOSE SERPL-MCNC: 131 MG/DL (ref 70–110)
HCT VFR BLD AUTO: 40.4 % (ref 40–54)
HGB BLD-MCNC: 13 GM/DL (ref 14–18)
IMM GRANULOCYTES # BLD AUTO: 0.01 K/UL (ref 0–0.04)
IMM GRANULOCYTES NFR BLD AUTO: 0.2 % (ref 0–0.5)
LYMPHOCYTES # BLD AUTO: 0.67 K/UL (ref 1–4.8)
MAGNESIUM SERPL-MCNC: 2 MG/DL (ref 1.6–2.6)
MCH RBC QN AUTO: 30.6 PG (ref 27–31)
MCHC RBC AUTO-ENTMCNC: 32.2 G/DL (ref 32–36)
MCV RBC AUTO: 95 FL (ref 82–98)
NUCLEATED RBC (/100WBC) (OHS): 0 /100 WBC
PHOSPHATE SERPL-MCNC: 2.2 MG/DL (ref 2.7–4.5)
PLATELET # BLD AUTO: 141 K/UL (ref 150–450)
PMV BLD AUTO: 10.6 FL (ref 9.2–12.9)
POTASSIUM SERPL-SCNC: 3.6 MMOL/L (ref 3.5–5.1)
PROT SERPL-MCNC: 6.6 GM/DL (ref 6–8.4)
RBC # BLD AUTO: 4.25 M/UL (ref 4.6–6.2)
RELATIVE EOSINOPHIL (OHS): 2.6 %
RELATIVE LYMPHOCYTE (OHS): 14.3 % (ref 18–48)
RELATIVE MONOCYTE (OHS): 8.8 % (ref 4–15)
RELATIVE NEUTROPHIL (OHS): 73 % (ref 38–73)
SODIUM SERPL-SCNC: 142 MMOL/L (ref 136–145)
TSH SERPL-ACNC: 1.43 UIU/ML (ref 0.4–4)
WBC # BLD AUTO: 4.68 K/UL (ref 3.9–12.7)

## 2025-04-02 PROCEDURE — 36591 DRAW BLOOD OFF VENOUS DEVICE: CPT

## 2025-04-02 PROCEDURE — 25500020 PHARM REV CODE 255: Performed by: INTERNAL MEDICINE

## 2025-04-02 PROCEDURE — 84443 ASSAY THYROID STIM HORMONE: CPT

## 2025-04-02 PROCEDURE — 25000003 PHARM REV CODE 250: Performed by: INTERNAL MEDICINE

## 2025-04-02 PROCEDURE — 82533 TOTAL CORTISOL: CPT

## 2025-04-02 PROCEDURE — 85025 COMPLETE CBC W/AUTO DIFF WBC: CPT

## 2025-04-02 PROCEDURE — 84100 ASSAY OF PHOSPHORUS: CPT

## 2025-04-02 PROCEDURE — 74177 CT ABD & PELVIS W/CONTRAST: CPT | Mod: TC

## 2025-04-02 PROCEDURE — 36415 COLL VENOUS BLD VENIPUNCTURE: CPT

## 2025-04-02 PROCEDURE — 82248 BILIRUBIN DIRECT: CPT

## 2025-04-02 PROCEDURE — 82247 BILIRUBIN TOTAL: CPT

## 2025-04-02 PROCEDURE — 63600175 PHARM REV CODE 636 W HCPCS: Performed by: INTERNAL MEDICINE

## 2025-04-02 PROCEDURE — A4216 STERILE WATER/SALINE, 10 ML: HCPCS | Performed by: INTERNAL MEDICINE

## 2025-04-02 PROCEDURE — 83735 ASSAY OF MAGNESIUM: CPT

## 2025-04-02 PROCEDURE — A9698 NON-RAD CONTRAST MATERIALNOC: HCPCS | Performed by: INTERNAL MEDICINE

## 2025-04-02 RX ORDER — SODIUM CHLORIDE 0.9 % (FLUSH) 0.9 %
10 SYRINGE (ML) INJECTION
Status: DISCONTINUED | OUTPATIENT
Start: 2025-04-02 | End: 2025-04-02 | Stop reason: HOSPADM

## 2025-04-02 RX ORDER — HEPARIN 100 UNIT/ML
500 SYRINGE INTRAVENOUS
Status: DISCONTINUED | OUTPATIENT
Start: 2025-04-02 | End: 2025-04-02 | Stop reason: HOSPADM

## 2025-04-02 RX ADMIN — IOHEXOL 100 ML: 350 INJECTION, SOLUTION INTRAVENOUS at 12:04

## 2025-04-02 RX ADMIN — Medication 10 ML: at 10:04

## 2025-04-02 RX ADMIN — BARIUM SULFATE 450 ML: 20 SUSPENSION ORAL at 12:04

## 2025-04-02 RX ADMIN — HEPARIN SODIUM (PORCINE) LOCK FLUSH IV SOLN 100 UNIT/ML 500 UNITS: 100 SOLUTION at 10:04

## 2025-04-02 NOTE — NURSING
Implanted port accessed using sterile technique, flushed easily with adequate blood return. Port flushed with NS. Labs drawn. Port deacessed. Pt tolerated well with no questions or complaints.

## 2025-04-03 ENCOUNTER — RESEARCH ENCOUNTER (OUTPATIENT)
Dept: RESEARCH | Facility: HOSPITAL | Age: 70
End: 2025-04-03
Payer: MEDICARE

## 2025-04-03 ENCOUNTER — OFFICE VISIT (OUTPATIENT)
Dept: HEMATOLOGY/ONCOLOGY | Facility: CLINIC | Age: 70
End: 2025-04-03
Payer: MEDICARE

## 2025-04-03 VITALS
OXYGEN SATURATION: 52 % | HEIGHT: 71 IN | SYSTOLIC BLOOD PRESSURE: 123 MMHG | BODY MASS INDEX: 30.21 KG/M2 | RESPIRATION RATE: 18 BRPM | TEMPERATURE: 98 F | WEIGHT: 215.81 LBS | HEART RATE: 52 BPM | DIASTOLIC BLOOD PRESSURE: 63 MMHG

## 2025-04-03 DIAGNOSIS — C15.9 ESOPHAGEAL ADENOCARCINOMA: Primary | ICD-10-CM

## 2025-04-03 DIAGNOSIS — I25.10 CORONARY ARTERY DISEASE INVOLVING NATIVE CORONARY ARTERY OF NATIVE HEART WITHOUT ANGINA PECTORIS: ICD-10-CM

## 2025-04-03 DIAGNOSIS — I48.0 PAROXYSMAL ATRIAL FIBRILLATION: ICD-10-CM

## 2025-04-03 DIAGNOSIS — I15.2 HYPERTENSION ASSOCIATED WITH DIABETES: ICD-10-CM

## 2025-04-03 DIAGNOSIS — E78.5 HYPERLIPIDEMIA ASSOCIATED WITH TYPE 2 DIABETES MELLITUS: ICD-10-CM

## 2025-04-03 DIAGNOSIS — E11.59 HYPERTENSION ASSOCIATED WITH DIABETES: ICD-10-CM

## 2025-04-03 DIAGNOSIS — E11.69 HYPERLIPIDEMIA ASSOCIATED WITH TYPE 2 DIABETES MELLITUS: ICD-10-CM

## 2025-04-03 DIAGNOSIS — N18.30 TYPE 2 DIABETES MELLITUS WITH STAGE 3 CHRONIC KIDNEY DISEASE, WITHOUT LONG-TERM CURRENT USE OF INSULIN, UNSPECIFIED WHETHER STAGE 3A OR 3B CKD: ICD-10-CM

## 2025-04-03 DIAGNOSIS — J38.00 VOCAL CORD PARALYSIS: ICD-10-CM

## 2025-04-03 DIAGNOSIS — E11.22 TYPE 2 DIABETES MELLITUS WITH STAGE 3 CHRONIC KIDNEY DISEASE, WITHOUT LONG-TERM CURRENT USE OF INSULIN, UNSPECIFIED WHETHER STAGE 3A OR 3B CKD: ICD-10-CM

## 2025-04-03 DIAGNOSIS — I50.20 HFREF (HEART FAILURE WITH REDUCED EJECTION FRACTION): ICD-10-CM

## 2025-04-03 DIAGNOSIS — D69.6 THROMBOCYTOPENIA: ICD-10-CM

## 2025-04-03 PROCEDURE — 99214 OFFICE O/P EST MOD 30 MIN: CPT | Mod: S$PBB,,, | Performed by: INTERNAL MEDICINE

## 2025-04-03 PROCEDURE — 99214 OFFICE O/P EST MOD 30 MIN: CPT | Mod: PBBFAC | Performed by: INTERNAL MEDICINE

## 2025-04-03 PROCEDURE — 99999 PR PBB SHADOW E&M-EST. PATIENT-LVL IV: CPT | Mod: PBBFAC,,, | Performed by: INTERNAL MEDICINE

## 2025-04-03 NOTE — PROGRESS NOTES
: URIEL Manriquez MD  Treating Investigators: NIRAV Medrano MD  Surgical Oncologist: SARAH Brown M.D. 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 Patient queried & verbalized his willingness to continue his participation on the above-mentioned trial. Patient queried & reports stable, intermittent dysphagia self-attributing to eating too large of bites and/of too much food at once. Patient queried and denies any new or changed AEs since his last visit, but reports that he was changed from warfarin to Xarelto starting 3/28/2025.    Patient completed his safety labs & Month 24 CT C/A/P on 02Apr2025, and his VS, PE & ECOG (ECOG = 0, per Dr. Medrano) today per protocol. Dr. Medrano assessed all patient's images, labs, VS & PE findings as either WNL or NCS.    Patient will RTC for Month 18 follow-up as outlined below, and every 3 months until April 2026 with CT scans every 6 months. Patient was encouraged to contact [facility] or Dr. Medrano's team with any questions or concerns & was reminded of clinic's 24/7 emergency contact number. Patient verbalized understanding & denies any additional questions at this time.        Step 2 -- Month 18 Follow Up:  [id number] @ 1015 - port draw   [id number] @ 1130 - CT C/A/P at [place]  [id number] @ 0930 - [facility]      Solicited AEs -- Assessed 10Jan2025:  ** Anemia - Grade 1 -- 30Jan2024 - ongoing -- (NCS per MD)  ** Platelet count decreased - Grade 1 -- 04Mar2024 - ongoing  (NCS per MD)   White blood cell count decreased - Grade  0  Neutrophil count decreased - Grade 0   ** Lymphocyte count decreased - Grade 2 - 29Apr2024 - ongoing (NCS per MD)  Peripheral motor neuropathy - Grade 0   Peripheral sensory neuropathy - Grade 0   Creatinine increased - Grade 0  Hypothyroidism - Grade 0   (TSH WNL on 16Oct2023)  Diarrhea (patient baseline BM = 2 stools a day) - Grade 0 -- reports loose stools vs. diarrhea    ** Fatigue - Grade 1 -- 21Aug2023 - 01Nov2024  Nausea - Grade 0   **Cough - Grade 1  -- Medical History  Pneumonitis - Grade 0  Hyperglycemia - Grade 0  Adrenal Insufficiency - Grade 0 - 10Jan2025 cortisol WNL  **Rash, maculopapular - Grade 1 - 17Jul2023 - 01Nov2024 ( used Sarna [camphor 0.5% - menthol 0.5&] PRN -- Prescribed triamcinolone 24Jul2023 ) -- (NCS per MD)  **Pruritis - Grade 1 - 17Jul2023 - 01Nov2024 -- (NCS per MD)  Erythema multiforme - Grade 0  Vomiting - Grade 0    Lipase increased -- Not required per protocol and therefore not assessed this visit.      Ongoing Non-Solicited AEs:  Hoarseness - Grade 2 - 14Mar2023 - ongoing ( no treatment )  Dysphagia, intermittent - Grade 1 - 02Nov2025 - ongoing ( no treatment )   Dyspnea, intermittent - Grade 1 - 01Dec2024 - ongoing ( no treatment - resolves with rest )       New or Changed Non-Solicited AEs Since Last Visit:  Weight loss - Grade 2 - [id number] - 11Oct2024 ( no treatment )         Complete Baseline Medical History, Adverse Events, and Concomitant Medications are located in patient's shadow chart    ** Note: Per protocol version [id number], patient's will be seen every 3 months for the first 2 years from the completion of adjuvant therapy - Adjuvant therapy completed 01Apr2024 **

## 2025-04-03 NOTE — PROGRESS NOTES
MEDICAL ONCOLOGY - ESTABLISHED PATIENT VISIT    Reason for visit: esophageal cancer    Best Contact Phone Number(s): There are no phone numbers on file.     Cancer/Stage/TNM:    Cancer Staging   Esophageal adenocarcinoma  Staging form: Esophagus - Adenocarcinoma, AJCC 8th Edition  - Pathologic stage from 3/28/2023: Stage II (ypT3, pN0, cM0, G2) - Signed by Santana Brown Jr., MD on 3/28/2023       Oncology History   Esophageal adenocarcinoma   12/8/2022 Initial Diagnosis    Esophageal adenocarcinoma     12/21/2022 - 12/21/2022 Chemotherapy    Treatment Summary   Plan Name: OP ESOPHAGEAL PACLITAXEL CARBOPLATIN WEEKLY  Treatment Goal: Curative  Status: Inactive  Start Date:   End Date:   Provider: Miki Medrano MD  Chemotherapy: CARBOplatin (PARAPLATIN) in sodium chloride 0.9% 250 mL chemo infusion, , Intravenous, Clinic/HOD 1 time, 0 of 1 cycle  PACLitaxeL (TAXOL) 50 mg/m2 = 120 mg in sodium chloride 0.9% 250 mL chemo infusion, 50 mg/m2, Intravenous, Clinic/HOD 1 time, 0 of 1 cycle     12/29/2022 - 1/20/2023 Chemotherapy    Treatment Summary   Plan Name: [facility] [id number] ARM B CARBOPLATIN PACLITAXEL NIVOLUMAB  Treatment Goal: Curative  Status: Inactive  Start Date: 12/29/2022  End Date: 1/20/2023  Provider: Miki Medrano MD  Chemotherapy: CARBOplatin (PARAPLATIN) 215 mg in sodium chloride 0.9% 306.5 mL chemo infusion, 215 mg, Intravenous, Clinic/HOD 1 time, 4 of 5 cycles  Administration: 215 mg (12/29/2022), 230 mg (1/5/2023), 265 mg (1/13/2023), 265 mg (1/20/2023)  PACLitaxeL (TAXOL) 50 mg/m2 = 120 mg in sodium chloride 0.9% 250 mL chemo infusion, 50 mg/m2 = 120 mg, Intravenous, Clinic/HOD 1 time, 4 of 5 cycles  Dose modification: 50 mg/m2 (original dose 50 mg/m2, Cycle 4)  Administration: 120 mg (12/29/2022), 120 mg (1/5/2023), 120 mg (1/13/2023), 120 mg (1/20/2023)     12/29/2022 - 2/1/2023 Radiation Therapy    Treating physician: Dr. Javier Moulton    Course: C1 CHEST 2022    Treatment Site Ref.  ID Energy Dose/Fx (Gy) #Fx Dose Correction (Gy) Total Dose (Gy) Start Date End Date Elapsed Days   IM Esophagus [id number] 6X 1.8 23 / 23 0 41.4 12/29/2022 2/1/2023 34          3/17/2023 Surgery    Procedure: Procedure(s) (LRB):  XI ROBOTIC ESOPHAGECTOMY (N/A)  ROBOTIC JEJUNOSTOMY TUBE INSERTION (N/A)      Surgeon(s) and Role:     * Santana Brown Jr., MD - Primary     * Darian Murphy MD - Assisting     Assistance: Due to having no qualified resident during critical portions of the case, Dr. Darian Murphy acted as an assistant.     Pre-Operative Diagnosis: Esophageal adenocarcinoma, distal 1/3     Post-Operative Diagnosis: Same     Pre-Operative Variables:  Stage: T3 N0  Adjacent organ involvement: None  Chemotherapy within 90 Days: Yes  Radiation Therapy within 90 Days: Yes     Comorbidities:  Morbid obesity  Type 2 diabetes mellitus  Obstructive sleep apnea  Gout  Hyperparathyroidism  Hypertension  Severe obesity  Coronary artery disease  Heart failure with reduced ejection fraction    Procedure:  Robotic-assisted Vj three field esophagectomy  Regional mediastinal lymph node dissection  Botox injection of the pylorus  Jejunostomy tube placement     Operative Findings:                No evidence of distant intraperitoneal metastases in the chest or abdomen  Esophageal tumor located in the distal third of the esophagus, mild radiation changes appreciated.  Resection status:  R0 pending final pathology.  No gross disease remaining post dissection.  Frozen sections on proximal and distal margins negative for malignancy  100u Botox injection into the pylorus  Stapled esophagogastrostomy performed at the neck incision after confirmation of negative margins.  Jejunostomy tube placed      3/28/2023 Cancer Staged    Staging form: Esophagus - Adenocarcinoma, AJCC 8th Edition  - Pathologic stage from 3/28/2023: Stage II (ypT3, pN0, cM0, G2)     5/1/2023 - 5/1/2023 Chemotherapy    Treatment Summary   Plan Name: [facility] [id number]  ARM C ADJUVANT NIVOLUMAB  Treatment Goal: Curative  Status: Inactive  Start Date: 5/1/2023  End Date: 5/1/2023  Provider: Miki Medrano MD  Chemotherapy: [No matching medication found in this treatment plan]     5/29/2023 - 4/1/2024 Chemotherapy    Treatment Summary   Plan Name: [facility] [id number] ARM C ADJUVANT NIVOLUMAB STEP 2  Treatment Goal: Curative  Status: Inactive  Start Date: 5/29/2023  End Date: 4/1/2024  Provider: Miki Medrano MD  Chemotherapy: [No matching medication found in this treatment plan]          Interim History:   Mr. Person returns to clinic today for follow-up while on surveillance after completion of adjuvant nivolumab. He underwent robotic esophagectomy on 3/14/23 with Dr. Brown and J tube insertion.     He is feeling well overall. No dysphagia but continues to have to eat slowly.  He will occasionally get short winded with activity; discussed with his primary physician who thought it was related to deconditioning.     Presents to clinic alone. ECOG 0.     Review of Systems   Constitutional:  Negative for chills, diaphoresis, fever, malaise/fatigue and weight loss.   HENT:  Negative for sore throat.    Eyes:  Negative for blurred vision and double vision.   Respiratory:  Negative for cough.    Cardiovascular:  Negative for chest pain, palpitations and leg swelling.   Gastrointestinal:  Negative for abdominal pain, blood in stool, constipation, diarrhea, heartburn, nausea and vomiting.   Genitourinary:  Negative for dysuria, frequency, hematuria and urgency.   Musculoskeletal:  Negative for back pain, falls, myalgias and neck pain.   Skin:  Negative for itching and rash.   Neurological:  Negative for dizziness, tingling, weakness and headaches.   Psychiatric/Behavioral:  The patient is not nervous/anxious.      Past Medical History:   Past Medical History:   Diagnosis Date    Anticoagulant long-term use     Cancer     Diabetes mellitus     Onset late 50s/early 60s    Hyperlipidemia      Hypertension     Onset late 50s/early 60s    Kidney stones     Sleep apnea     since 2006      Past Surgical History:   Past Surgical History:   Procedure Laterality Date    COLONOSCOPY N/A 10/26/2023    Procedure: COLONOSCOPY;  Surgeon: Noah Duong MD;  Location: [facility] (4TH FLR);  Service: Endoscopy;  Laterality: N/A;  instructions sent to patient portal. Tb  referral: Dr. Wilson  Pt. on Xarelto--tb-ok to hold see te 8/15/23 dr vu  10/13-precall complete-MS  10/19 pt rescheduled, Xarelto hold, PEG, portal -ml  10/24-precall complete-KPvt    CORONARY ANGIOGRAPHY N/A 9/30/2020    Procedure: ANGIOGRAM, CORONARY ARTERY;  Surgeon: John West MD;  Location: [facility] CATH LAB;  Service: Cardiology;  Laterality: N/A;    CYSTOSCOPY N/A 2/17/2022    Procedure: CYSTOSCOPY;  Surgeon: Lukasz Hughes MD;  Location: [facility] OR 1ST FLR;  Service: Urology;  Laterality: N/A;    CYSTOSCOPY W/ URETERAL STENT PLACEMENT N/A 2/25/2022    Procedure: CYSTOSCOPY, WITH URETERAL STENT INSERTION;  Surgeon: Lukasz Hughes MD;  Location: [facility] OR 1ST FLR;  Service: Urology;  Laterality: N/A;    ENDOSCOPIC ULTRASOUND OF UPPER GASTROINTESTINAL TRACT N/A 12/7/2022    Procedure: ULTRASOUND, UPPER GI TRACT, ENDOSCOPIC;  Surgeon: Darian Main MD;  Location: [facility];  Service: Endoscopy;  Laterality: N/A;  Approval to hold Xarelto rec'd from Dr. Vu (see t/e 12/5/22)-DS    ESOPHAGOGASTRODUODENOSCOPY N/A 11/17/2022    Procedure: EGD (ESOPHAGOGASTRODUODENOSCOPY);  Surgeon: Brody Gonzales MD;  Location: [facility] (2ND FLR);  Service: Endoscopy;  Laterality: N/A;  inst via email-ok to hold Xarelto x 2 days-MS    ESOPHAGOGASTRODUODENOSCOPY N/A 5/31/2023    Procedure: EGD (ESOPHAGOGASTRODUODENOSCOPY) WITH DILATION;  Surgeon: Santana Brown Jr., MD;  Location: [facility] OR 2ND FLR;  Service: General;  Laterality: N/A;    ESOPHAGOGASTRODUODENOSCOPY N/A 5/3/2024    Procedure: EGD (ESOPHAGOGASTRODUODENOSCOPY) with botox  injection;  Surgeon: Santana Brown Jr., MD;  Location: [facility] OR 2ND FLR;  Service: General;  Laterality: N/A;    INJECTION OF BOTULINUM TOXIN TYPE A  5/3/2024    Procedure: INJECTION, BOTULINUM TOXIN, TYPE A;  Surgeon: Santana Brown Jr., MD;  Location: [facility] OR 2ND FLR;  Service: General;;    LASER LITHOTRIPSY Left 2/25/2022    Procedure: LITHOTRIPSY, USING LASER;  Surgeon: Lukasz Hughes MD;  [facility];  Service: Urology;  Laterality: Left;    LEFT HEART CATHETERIZATION Left 9/30/2020    Procedure: Left heart cath;  Surgeon: John West MD;  Location: [facility] CATH LAB;  Service: Cardiology;  Laterality: Left;    LITHOTRIPSY      PARATHYROIDECTOMY  1/1/2-107    PYELOSCOPY Left 2/25/2022    Procedure: PYELOSCOPY;  Surgeon: Lukasz Hughes MD;  Location: [facility] OR [address] [place] [address];  Service: Urology;  Laterality: Left;    ROBOT-ASSISTED SURGICAL REMOVAL OF ESOPHAGUS USING DA CARLOS XI N/A 3/14/2023    Procedure: XI ROBOTIC ESOPHAGECTOMY;  Surgeon: Santana Brown Jr., MD;  [facility];  Service: General;  Laterality: N/A;  Abdomen, Chest and Neck    ROBOTIC JEJUNOSTOMY N/A 3/14/2023    Procedure: ROBOTIC JEJUNOSTOMY TUBE INSERTION;  Surgeon: Santana Brown Jr., MD;  [facility];  Service: General;  Laterality: N/A;    TRANSESOPHAGEAL ECHOCARDIOGRAPHY N/A 4/7/2022    Procedure: ECHOCARDIOGRAM, TRANSESOPHAGEAL;  Surgeon: Xander Diagnostic Provider;  Location: [facility] EP LAB;  Service: Cardiology;  Laterality: N/A;    TREATMENT OF CARDIAC ARRHYTHMIA N/A 4/7/2022    Procedure: Cardioversion or Defibrillation;  Surgeon: Iris Fisher NP;  Location: [facility] EP LAB;  Service: Cardiology;  Laterality: N/A;  afib, dccv, atrie, anes, EH, 3prep    URETEROSCOPIC REMOVAL OF URETERIC CALCULUS Left 2/25/2022    Procedure: REMOVAL, CALCULUS, URETER, URETEROSCOPIC;  Surgeon: Lukasz Hughes MD;  [facility];  Service: Urology;  Laterality: Left;    URETEROSCOPY Left  2022    Procedure: URETEROSCOPY;  Surgeon: Lukasz Hughes MD;  Location: [facility] OR 1ST FLR;  Service: Urology;  Laterality: Left;    VOCAL CORD INJECTION Left 3/17/2023    Procedure: INJECTION, VOCAL CORD, LARYNGOSCOPIC;  Surgeon: Gm Altman MD;  Location: [facility] OR 2ND FLR;  Service: ENT;  Laterality: Left;      Family History:   Family History   Problem Relation Name Age of Onset    Arthritis Mother Juany     Heart disease Father Diomedes 70        CABG    Arthritis Father Diomedes     Diabetes Father Diomedes     Kidney disease Father Diomedes         had one kidney removed in early thirties    Arthritis Sister Dee     Arthritis Sister Josette     Hypertension Sister Delores     Colon cancer Brother Sterling 32    Arthritis Brother Diomedes     Alcohol abuse Paternal Aunt Betina     Cancer Maternal Grandfather Yaron         throat cancer    Diabetes Paternal Grandmother Cassandra     Colon polyps Paternal Grandfather Diomedes     Colon cancer Paternal Grandfather Diomedes     Cancer Paternal Grandfather Diomedes         colon cancer at age 62      Social History:   Social History     Tobacco Use    Smoking status: Former     Current packs/day: 0.00     Average packs/day: 1 pack/day for 22.7 years (22.7 ttl pk-yrs)     Types: Cigarettes, Cigars     Start date: 1972     Quit date:      Years since quittin.9     Passive exposure: Never    Smokeless tobacco: Never    Tobacco comments:     smoking was off and on.  cumulative 7 years.  never more than three years in one stretch or more jl   Substance Use Topics    Alcohol use: Yes     Alcohol/week: 4.0 standard drinks of alcohol     Types: 4 Shots of liquor per week      I have reviewed and updated the patient's past medical, surgical, family and social histories.    Allergies:   Review of patient's allergies indicates:  No Known Allergies     Medications:   Current Outpatient Medications   Medication Sig Dispense Refill    allopurinoL  "(ZYLOPRIM) 100 MG tablet TAKE 1 TABLET BY MOUTH EVERY DAY 90 tablet 3    blood sugar diagnostic (ACCU-CHEK ANTONELLA PLUS TEST STRP) Strp Use to test CBG four times daily E11.65 400 strip 3    blood-glucose meter kit Checks blood sugars 1x/daily. 1 each 12    hydroCHLOROthiazide (HYDRODIURIL) 25 MG tablet TAKE 1 TABLET(25 MG) BY MOUTH EVERY DAY 30 tablet 11    lancets (ACCU-CHEK SOFTCLIX LANCETS) Misc USE 1 EVERY DAY OR AS DIRECTED 100 each 3    metoprolol succinate (TOPROL-XL) 25 MG 24 hr tablet Take 0.5 tablets (12.5 mg total) by mouth once daily. 45 tablet 2    nitroGLYCERIN (NITROSTAT) 0.4 MG SL tablet Place 1 tablet (0.4 mg total) under the tongue every 5 (five) minutes as needed for Chest pain. If you need a third tablet, call 911 100 tablet 3    omeprazole (PRILOSEC) 40 MG capsule TAKE 1 CAPSULE(40 MG) BY MOUTH EVERY DAY 90 capsule 3    rosuvastatin (CRESTOR) 20 MG tablet TAKE 1 TABLET(20 MG) BY MOUTH EVERY EVENING 90 tablet 3    tamsulosin (FLOMAX) 0.4 mg Cap Take 1 capsule (0.4 mg total) by mouth once daily. 30 minutes after dinner 90 capsule 3    testosterone (ANDROGEL) 20.25 mg/1.25 gram (1.62 %) GlPm Apply 4 pumps to shoulders daily 2 each 5    XARELTO 20 mg Tab TAKE 1 TABLET BY MOUTH EVERY DAY WITH EVENING MEAL 30 tablet 11    losartan (COZAAR) 25 MG tablet Take 1 tablet (25 mg total) by mouth 2 (two) times a day. 180 tablet 3     No current facility-administered medications for this visit.      Physical Exam:   /63 (BP Location: Left arm, Patient Position: Sitting)   Pulse (!) 52   Temp 97.9 °F (36.6 °C) (Temporal)   Resp 18   Ht 5' 11" (1.803 m)   Wt 97.9 kg (215 lb 13.3 oz)   SpO2 (!) 52%   BMI 30.10 kg/m²        Physical Exam  Vitals reviewed.   Constitutional:       General: He is not in acute distress.     Appearance: Normal appearance. He is not ill-appearing, toxic-appearing or diaphoretic.   HENT:      Head: Normocephalic and atraumatic.      Right Ear: External ear normal.      Left " Ear: External ear normal.      Nose: Nose normal. No congestion.      Mouth/Throat:      Pharynx: Oropharynx is clear.   Eyes:      General: No scleral icterus.     Extraocular Movements: Extraocular movements intact.      Conjunctiva/sclera: Conjunctivae normal.      Pupils: Pupils are equal, round, and reactive to light.   Cardiovascular:      Rate and Rhythm: Normal rate and regular rhythm.   Pulmonary:      Effort: Pulmonary effort is normal. No respiratory distress.   Chest:      Comments: RCW port  Abdominal:      General: There is no distension.      Palpations: Abdomen is soft.      Tenderness: There is no abdominal tenderness.   Musculoskeletal:         General: No swelling.      Cervical back: Normal range of motion.   Lymphadenopathy:      Cervical: No cervical adenopathy.   Skin:     Coloration: Skin is not jaundiced.      Findings: No bruising, erythema or rash.   Neurological:      General: No focal deficit present.      Mental Status: He is alert and oriented to person, place, and time. Mental status is at baseline.      Cranial Nerves: No cranial nerve deficit.      Motor: No weakness.      Gait: Gait normal.   Psychiatric:         Mood and Affect: Mood normal.         Behavior: Behavior normal.         Thought Content: Thought content normal.         Judgment: Judgment normal.         Labs:   Recent Results (from the past 48 hours)   Magnesium    Collection Time: 04/02/25 10:16 AM   Result Value Ref Range    Magnesium  2.0 1.6 - 2.6 mg/dL   PHOSPHORUS    Collection Time: 04/02/25 10:16 AM   Result Value Ref Range    Phosphorus Level 2.2 (L) 2.7 - 4.5 mg/dL   BILIRUBIN, DIRECT    Collection Time: 04/02/25 10:16 AM   Result Value Ref Range    Bilirubin Direct 0.2 0.1 - 0.3 mg/dL   TSH    Collection Time: 04/02/25 10:16 AM   Result Value Ref Range    TSH 1.425 0.400 - 4.000 uIU/mL   CORTISOL, RANDOM    Collection Time: 04/02/25 10:16 AM   Result Value Ref Range    Cortisol 7.80 ug/dL   COMPREHENSIVE  METABOLIC PANEL    Collection Time: 04/02/25 10:16 AM   Result Value Ref Range    Sodium 142 136 - 145 mmol/L    Potassium 3.6 3.5 - 5.1 mmol/L    Chloride 109 95 - 110 mmol/L    CO2 25 23 - 29 mmol/L    Glucose 131 (H) 70 - 110 mg/dL    BUN 11 8 - 23 mg/dL    Creatinine 1.0 0.5 - 1.4 mg/dL    Calcium 8.5 (L) 8.7 - 10.5 mg/dL    Protein Total 6.6 6.0 - 8.4 gm/dL    Albumin 3.7 3.5 - 5.2 g/dL    Bilirubin Total 0.6 0.1 - 1.0 mg/dL    ALP 69 40 - 150 unit/L    AST 22 11 - 45 unit/L    ALT 24 10 - 44 unit/L    Anion Gap 8 8 - 16 mmol/L    eGFR >60 >60 mL/min/1.73/m2   CBC with Differential    Collection Time: 04/02/25 10:16 AM   Result Value Ref Range    WBC 4.68 3.90 - 12.70 K/uL    RBC 4.25 (L) 4.60 - 6.20 M/uL    HGB 13.0 (L) 14.0 - 18.0 gm/dL    HCT 40.4 40.0 - 54.0 %    MCV 95 82 - 98 fL    MCH 30.6 27.0 - 31.0 pg    MCHC 32.2 32.0 - 36.0 g/dL    RDW 13.6 11.5 - 14.5 %    Platelet Count 141 (L) 150 - 450 K/uL    MPV 10.6 9.2 - 12.9 fL    Nucleated RBC 0 <=0 /100 WBC    Neut % 73.0 38 - 73 %    Lymph % 14.3 (L) 18 - 48 %    Mono % 8.8 4 - 15 %    Eos % 2.6 <=8 %    Basophil % 1.1 <=1.9 %    Imm Grans % 0.2 0.0 - 0.5 %    Neut # 3.42 1.8 - 7.7 K/uL    Lymph # 0.67 (L) 1 - 4.8 K/uL    Mono # 0.41 0.3 - 1 K/uL    Eos # 0.12 <=0.5 K/uL    Baso # 0.05 <=0.2 K/uL    Imm Grans # 0.01 0.00 - 0.04 K/uL     Imaging:      CT CAP - 4/2/25:    Impression:     Postsurgical changes status post esophagectomy with gastric pull-through, not significantly changed compared to prior exam.  No convincing evidence of metastatic disease in the chest, abdomen, or pelvis.     5 mm pulmonary nodule in the right lower lobe, previously measuring 3 mm.  Attention on follow-up.     Additional stable findings as above.       Path:   3/14/23:  Final Pathologic Diagnosis 1. LYMPH NODE, LEVEL 7, EXCISION:   One benign lymph node (0/1)   2. TOTAL THORACIC ESOPHAGECTOMY AND PROXIMAL STOMACH:   Adenocarcinoma, moderately differentiated, 3.0 cm    Margins are negative   Tumor invades adventitia   Extensive residual cancer with no evident tumor regression (poor or no   response, score 3)   Eleven benign lymph nodes (0/11)   3. RESIDUAL CONDUIT, EXCISION:   Negative for malignancy   SYNOPTIC REPORT   Procedure - Esophageal gastrectomy   Tumor site - GE Junction   Relationship of tumor to esophagogastric junction - Lesion is central at GE   junction   Tumor size - 3.0 x 3.1cm   Histologic type - Adenocarcinoma   Histologic grade - Moderately differentiated   Microscopic tumor extension - Tumor invades adventitia   Margins - All margins of resection are uninvolved, proximal, distal and   adventitial margins are negative   Treatment effect - Extensive residual cancer with no evident tumor regression   (poor or no response, score 3)   Lymphovascular invasion - Not identified   Pathologic staging - yp T3 N0 Mx   Lymph nodes examined - 12   Lymph nodes involved - 0   Additional pathologic findings - Intestinal metaplasia present   MMR-IHC has been performed on previous biopsy ([id number]) and shows all 4   antibodies intact      Assessment:       1. Esophageal adenocarcinoma    2. Vocal cord paralysis    3. Hypertension associated with diabetes    4. Hyperlipidemia associated with type 2 diabetes mellitus    5. Type 2 diabetes mellitus with stage 3 chronic kidney disease, without long-term current use of insulin, unspecified whether stage 3a or 3b CKD    6. Coronary artery disease involving native coronary artery of native heart without angina pectoris    7. HFrEF (heart failure with reduced ejection fraction)    8. Paroxysmal atrial fibrillation    9. Thrombocytopenia            Plan:     # Esophageal adenocarcinoma   Mr. Person is a pleasant 67 year old male who presents to our clinic for management of esophageal cancer. He initially presented with dysphagia, and further workup confirmed a stage II adenocarcinoma, pMMR. Tumor staged T3N0Mx by endosonographic  criteria, JEVON. CT CAP on 12/14/22 confirmed no evidence of metastatic disease.    Previously conversation regarding his diagnosis and treatment options.  Recommended perioperative chemo/radiation per the CROSS trial. Discussed chemoradiation for ~5 weeks with 5 doses of weekly carboplatin and taxol administered. Plan to obtain restaging scans 4-5 weeks after radiation completed.     He was a candidate for a cooperative group trial assessing perioperative immunotherapy treatment. He consented for this study - ECOG-ACRIN [id number]: A Phase II/III Study of Dilcia-operative Nivolumab and Ipilimumab in Patients with Locoregional Esophageal and Gastroesophageal Junction Adenocarcinoma    Previously met with Dr. Santana Brown who agreed he was a surgical candidate.  Previously met with Dr. Javier Moulton who will be treating him with radiation.    Began cycle 1 carboplatin/paclitaxel/nivolumab on trial on 12/29/22.  Received cycle 4 of weekly chemotherapy on trial on 1/20/23.   We held chemotherapy for week 5 because of thrombocytopenia per protocol.    Completed radiation on 2/1/23.    Restaging PET/CT personally reviewed on 3/1/23 shows interval decreased FDG avidity in esophageal tumor, no new sites of disease.  CT CAP 3/2/23 shows stable esophageal thickening.    Underwent robotic esophagectomy on 3/14/23 with Dr. Brown.  Pathology showed negative margins, ypT3 N0 tumor with 0 of 12 lymph nodes involved.  No treatment effect was noted on the tumor tissue.    Discussed that per the SC0060 protocol will proceed with randomization to adjuvant nivolumab +/- ipilimumab. Patient randomized to receive adjuvant Nivolumab, started cycle 1 at 480 mg q4 weeks 5/1/23. Plan for twelve months per protocol.    Tolerating very well. Grade 1 pruritis.    Repeat imaging after cycle 6 shows DAVE.  Repeat imaging after cycle 13 (final cycle) shows DAVE.    Final cycle administered 4/1/24.    Underwent dilation with Dr. Brown on 5/31/23  with temporary improvement in dysphagia. Weight stable.  Still having some dysphagia so esophogram ordered which showed concern for pyloric stricture.   Underwent Botox injection into pylorus with Dr. Brown on 5/3/24.     PD-L1 CPS 30.    CT CAP 10/2024 shows DAVE.  CT CAP 4/2025 shows DAVE.    Doing well with no new concerns.      RTC in 3 months per protocol with imaging.    # Vocal cord paralysis  Post-op. S/p injection by ENT.  Stable. Last evaluated 9/11/23 by ENT with improvement.      # HTN, HLD, DM, CKD  Following with PCP Dr. Wilson and nephrologist Dr. Oconnell.   BP managed by Dr. Nick.  BP normal today. Taking losartan and HCTZ.  Cr stable.    # CAD, A fib, CHF  Following with cardiologist Dr. Nick & EP Dr. Phillip.   Back on Xarelto for a-fib.  Recommended he discuss his dyspnea with his cardiologist.  Continue medical management.     # Cytopenias  Chronic TCP and anemia.  No change.  Monitor.    Follow up: per research.    Patient is in agreement with the proposed treatment plan. All questions were answered to the patient's satisfaction. Pt knows to call clinic if anything is needed before the next clinic visit.    Miki Medrano MD  Hematology/Oncology  [facility]      Route Chart for Scheduling    Med Onc Chart Routing      Follow up with physician . Per research   Follow up with SAMUEL    Infusion scheduling note    Injection scheduling note    Labs    Imaging    Pharmacy appointment    Other referrals                Supportive Plan Information  IV FLUIDS AND ELECTROLYTES Miki Medrano MD   Associated Diagnosis: Esophageal adenocarcinoma Stage II ypT3, pN0, cM0, G2 noted on 12/8/2022   Line of treatment: Supportive Care   Treatment goal: Supportive     Upcoming Treatment Dates - IV FLUIDS AND ELECTROLYTES    No upcoming days in selected categories.    Therapy Plan Information  PORT FLUSH for Esophageal adenocarcinoma, noted on 12/8/2022  Flushes  heparin, porcine (PF)  100 unit/mL injection flush 500 Units  500 Units, Intravenous, Every visit  sodium chloride 0.9% flush 10 mL  10 mL, Intravenous, Every visit      No therapy plan of the specified type found.    No therapy plan of the specified type found.

## 2025-04-06 DIAGNOSIS — I42.8 NON-ISCHEMIC CARDIOMYOPATHY: ICD-10-CM

## 2025-04-06 RX ORDER — LOSARTAN POTASSIUM 25 MG/1
25 TABLET ORAL 2 TIMES DAILY
Qty: 180 TABLET | Refills: 1 | Status: SHIPPED | OUTPATIENT
Start: 2025-04-06

## 2025-04-07 NOTE — TELEPHONE ENCOUNTER
Refill Decision Note   Lukasz Person  is requesting a refill authorization.  Brief Assessment and Rationale for Refill:  Approve     Medication Therapy Plan:         Comments:     Note composed:10:22 PM 04/06/2025

## 2025-04-10 ENCOUNTER — OFFICE VISIT (OUTPATIENT)
Dept: INTERNAL MEDICINE | Facility: CLINIC | Age: 70
End: 2025-04-10
Payer: MEDICARE

## 2025-04-10 VITALS
DIASTOLIC BLOOD PRESSURE: 60 MMHG | SYSTOLIC BLOOD PRESSURE: 120 MMHG | HEART RATE: 47 BPM | TEMPERATURE: 98 F | OXYGEN SATURATION: 98 % | BODY MASS INDEX: 29.52 KG/M2 | WEIGHT: 217.94 LBS | HEIGHT: 72 IN

## 2025-04-10 DIAGNOSIS — I10 ESSENTIAL HYPERTENSION: Chronic | ICD-10-CM

## 2025-04-10 DIAGNOSIS — E11.9 DM TYPE 2 WITHOUT RETINOPATHY: Chronic | ICD-10-CM

## 2025-04-10 DIAGNOSIS — E21.0 HYPERPARATHYROIDISM, PRIMARY: ICD-10-CM

## 2025-04-10 DIAGNOSIS — I25.10 CORONARY ARTERY DISEASE INVOLVING NATIVE CORONARY ARTERY OF NATIVE HEART WITHOUT ANGINA PECTORIS: Chronic | ICD-10-CM

## 2025-04-10 DIAGNOSIS — T45.1X5A CHEMOTHERAPY-INDUCED NEUROPATHY: ICD-10-CM

## 2025-04-10 DIAGNOSIS — E78.5 HYPERLIPIDEMIA ASSOCIATED WITH TYPE 2 DIABETES MELLITUS: Chronic | ICD-10-CM

## 2025-04-10 DIAGNOSIS — E11.9 TYPE 2 DIABETES MELLITUS WITHOUT COMPLICATION, WITHOUT LONG-TERM CURRENT USE OF INSULIN: Primary | Chronic | ICD-10-CM

## 2025-04-10 DIAGNOSIS — I70.0 AORTIC ATHEROSCLEROSIS: Chronic | ICD-10-CM

## 2025-04-10 DIAGNOSIS — E11.36 DIABETES MELLITUS WITH CATARACT: Chronic | ICD-10-CM

## 2025-04-10 DIAGNOSIS — G62.0 CHEMOTHERAPY-INDUCED NEUROPATHY: ICD-10-CM

## 2025-04-10 DIAGNOSIS — I48.0 PAROXYSMAL ATRIAL FIBRILLATION: Chronic | ICD-10-CM

## 2025-04-10 DIAGNOSIS — I50.20 HFREF (HEART FAILURE WITH REDUCED EJECTION FRACTION): Chronic | ICD-10-CM

## 2025-04-10 DIAGNOSIS — L98.9 SKIN LESION: ICD-10-CM

## 2025-04-10 DIAGNOSIS — C15.9 ESOPHAGEAL ADENOCARCINOMA: Chronic | ICD-10-CM

## 2025-04-10 DIAGNOSIS — E11.69 HYPERLIPIDEMIA ASSOCIATED WITH TYPE 2 DIABETES MELLITUS: Chronic | ICD-10-CM

## 2025-04-10 PROBLEM — E44.0 MODERATE MALNUTRITION: Status: RESOLVED | Noted: 2023-06-09 | Resolved: 2025-04-10

## 2025-04-10 PROCEDURE — G2211 COMPLEX E/M VISIT ADD ON: HCPCS | Mod: S$PBB,,, | Performed by: INTERNAL MEDICINE

## 2025-04-10 PROCEDURE — 99214 OFFICE O/P EST MOD 30 MIN: CPT | Mod: S$PBB,,, | Performed by: INTERNAL MEDICINE

## 2025-04-10 PROCEDURE — 99214 OFFICE O/P EST MOD 30 MIN: CPT | Mod: PBBFAC,PO | Performed by: INTERNAL MEDICINE

## 2025-04-10 PROCEDURE — 99999 PR PBB SHADOW E&M-EST. PATIENT-LVL IV: CPT | Mod: PBBFAC,,, | Performed by: INTERNAL MEDICINE

## 2025-04-10 RX ORDER — METFORMIN HYDROCHLORIDE 500 MG/1
500 TABLET ORAL
Qty: 90 TABLET | Refills: 3 | Status: SHIPPED | OUTPATIENT
Start: 2025-04-10 | End: 2026-04-10

## 2025-04-10 NOTE — PROGRESS NOTES
[id number]:       1. Type 2 diabetes mellitus without complication, without long-term current use of insulin  - metFORMIN (GLUCOPHAGE) 500 MG tablet; Take 1 tablet (500 mg total) by mouth daily with breakfast.  Dispense: 90 tablet; Refill: 3    2. DM type 2 without retinopathy  - metFORMIN (GLUCOPHAGE) 500 MG tablet; Take 1 tablet (500 mg total) by mouth daily with breakfast.  Dispense: 90 tablet; Refill: 3    3. Diabetes mellitus with cataract  - metFORMIN (GLUCOPHAGE) 500 MG tablet; Take 1 tablet (500 mg total) by mouth daily with breakfast.  Dispense: 90 tablet; Refill: 3    4. Essential hypertension    5. Hyperlipidemia associated with type 2 diabetes mellitus    6. Aortic atherosclerosis    7. Coronary artery disease involving native coronary artery of native heart without angina pectoris    8. HFrEF (heart failure with reduced ejection fraction)    9. Paroxysmal atrial fibrillation    10. Esophageal adenocarcinoma    11. Hyperparathyroidism, primary    12. Chemotherapy-induced neuropathy    13. Skin lesion  - Ambulatory referral/consult to Dermatology; Future        Plan:       1/2/3.  Continue diet and exercise controlled.    4.  Continue HCTZ 25 mg, Toprol-XL 25 mg, losartan 25 mg   5/6/7.  Continue Crestor 20 mg, Toprol-XL 25 mg, losartan 25 mg   8.  Continue Crestor 20 mg, Toprol-XL 25 mg, losartan 25 mg   9.  Continue Toprol-XL 25 mg, Xarelto 20 mg   10.  Monitor   11.  Continue to monitor with CMP.    12. Monitor.  13. Refer to Dermatology.    Deep Scribe:  IMPRESSION:  1. A1c increased to 7.2, indicating suboptimal diabetes control.  2. Evaluated skin lesion on arm, suspecting actinic keratosis.  3. Identified seborrheic keratosis on face, determined to be benign.  4. Assessed reported intermittent tongue soreness, no visible abnormalities noted.    SUMMARY:   Initiate metformin 500 mg daily with breakfast   Diagnosed seborrheic keratosis - no treatment required   Diagnosed actinic keratosis - referred  to dermatologist for potential removal   Start exercise regimen at local gym within 1-2 weeks   Gargle with salt water for symptomatic relief of tongue sore spots   Referral to dermatologist for evaluation and treatment of skin concerns   Schedule appointment with dermatologist at    Follow-up in 3 months to assess adherence to exercise and diet changes    TYPE 2 DIABETES MELLITUS WITH HYPERGLYCEMIA:   Monitored the patient's blood sugar, which is averaging in the lower half of the 130s, occasionally dropping to the 120s.   Noted that the patient's A1c has increased to 7.2.   Evaluated the patient's kidney function, which was found to be within normal limits.   Acknowledged that blood sugar is running high and A1c is creeping up.   Initiated metformin 500 mg daily to be taken in the morning with breakfast to help manage glucose levels.   Encouraged exercise and diet control.   Instructed the patient to continue checking blood sugars at home 2-3 times per week.   Mr. Person to start exercise regimen at local gym within the next 1-2 weeks.   Mr. Person to maintain consistent gym attendance, even on days lacking motivation.   Recommend walking to the gym for additional cardio exercise.   Mr. Person to continue monitoring BP 2-3 times per week.    ACTINIC KERATOSIS:   Identified a lesion on the patient's arm during exam.   Diagnosed the lesion as actinic keratosis.   Explained that actinic keratosis is pre-cancerous and results from sun exposure.   Emphasized the potential for actinic keratosis to become dangerous if left untreated.   Referred the patient to a dermatologist for evaluation and treatment of the actinic keratosis and other skin concerns.    SEBORRHEIC KERATOSIS:   Observed a lesion on the patient during exam.   Identified the lesion as seborrheic keratosis.   Explained that seborrheic keratosis is benign and hereditary.   Informed the patient that no specific treatment is required.   Mentioned the  possibility of removal with liquid nitrogen by a dermatologist if desired.    GLOSSODYNIA (TONGUE PAIN):   Noted the patient's report of occasional sore spots on the tongue.   Examined the tongue but found no visible abnormalities at the time of exam.   Suggested that the soreness could be due to a small abscess ulcer, possibly related to stress.   Recommend gargling with salt water when the sore spots occur for symptomatic relief.    FOLLOW-UP:   Scheduled a follow-up visit in 3 months to assess adherence to exercise and diet changes.   Follow up in 3 months to assess adherence to exercise and diet changes.   Stop at the  to schedule an appointment with a dermatologist.                 This note was generated with the assistance of ambient listening technology. Verbal consent was obtained by the patient and accompanying visitor(s) for the recording of patient appointment to facilitate this note. I attest to having reviewed and edited the generated note for accuracy, though some syntax or spelling errors may persist. Please contact the author of this note for any clarification.       Subjective:       Patient ID: Lukasz Person is a 70 y.o. male.    Chief Complaint: Follow-up    HPI    70-year-old male here for six-month follow-up.    Patient has diabetes that is diet and exercise controlled.  Lab Results   Component Value Date    HGBA1C 7.2 (H) 01/28/2025    HGBA1C 6.8 (H) 10/11/2024    HGBA1C 6.8 (H) 04/01/2024     Lab Results   Component Value Date    LDLCALC 37.0 (L) 01/10/2025    CREATININE 1.0 04/02/2025     Patient has hypertension on HCTZ 25 mg, Toprol-XL 25 mg, losartan 25 mg..    Patient has hyperlipidemia and aortic atherosclerosis and coronary artery disease on Crestor 20 mg.    Patient has heart failure with reduced ejection fraction on Crestor 20 mg, Toprol-XL 25 mg, losartan 25 mg.    Patient has paroxysmal atrial fibrillation on Toprol-XL 25 mg, Xarelto 20 mg.    Patient has esophageal  adenocarcinoma followed by Oncology.    Patient has hyperparathyroidism monitored with CMP.    Patient has chemotherapy-induced neuropathy on no current medication.    History of Present Illness    CHIEF COMPLAINT:  Mr. Person presents today for a 6-month follow-up    DIABETES:  He reports elevated blood sugar with an average of 137 and A1c of 7.2. His diabetes was previously controlled with diet and exercise alone. He has history of high dose metformin use but dislikes the medication, though denies any specific adverse effects.    BLOOD PRESSURE:  He checks blood pressure 2-3 times per week at home.    DERMATOLOGIC:  He recently noticed a lesion on his arm and reports experiencing sore spots on his head. He also experiences occasional sore spots on his tongue that can occur on either side and develop suddenly without apparent cause. He denies any current tongue soreness or discomfort.      ROS:  ENT: +tongue pain  Integumentary: +skin lesion, +sores         Review of Systems          Objective:      Physical Exam  Vitals reviewed.   Constitutional:       Appearance: He is well-developed.   HENT:      Head: Normocephalic and atraumatic.      Mouth/Throat:      Pharynx: No oropharyngeal exudate.   Eyes:      General: No scleral icterus.        Right eye: No discharge.         Left eye: No discharge.      Pupils: Pupils are equal, round, and reactive to light.   Neck:      Thyroid: No thyromegaly.      Trachea: No tracheal deviation.   Cardiovascular:      Rate and Rhythm: Normal rate and regular rhythm.      Heart sounds: Normal heart sounds. No murmur heard.     No friction rub. No gallop.   Pulmonary:      Effort: Pulmonary effort is normal. No respiratory distress.      Breath sounds: Normal breath sounds. No wheezing or rales.   Chest:      Chest wall: No tenderness.   Abdominal:      General: Bowel sounds are normal. There is no distension.      Palpations: Abdomen is soft. There is no mass.      Tenderness: There  is no abdominal tenderness. There is no guarding or rebound.   Musculoskeletal:         General: No tenderness. Normal range of motion.      Cervical back: Normal range of motion and neck supple.   Skin:     General: Skin is warm and dry.      Coloration: Skin is not pale.      Findings: No erythema or rash.   Neurological:      Mental Status: He is alert and oriented to person, place, and time.   Psychiatric:         Behavior: Behavior normal.

## 2025-04-23 DIAGNOSIS — E11.65 UNCONTROLLED TYPE 2 DIABETES MELLITUS WITH HYPERGLYCEMIA: ICD-10-CM

## 2025-04-23 NOTE — TELEPHONE ENCOUNTER
Care Due:                  Date            Visit Type   Department     Provider  --------------------------------------------------------------------------------                                EP -                              PRIMARY      [facility] INTERNAL  Last Visit: 04-      CARE ([facility])   CAREY Wilson                              EP -                              PRIMARY      [facility] INTERNAL  Next Visit: 07-      CARE ([facility])   MEDICINE       Kirk Wilson                                                            Last  Test          Frequency    Reason                     Performed    Due Date  --------------------------------------------------------------------------------    Uric Acid...  12 months..  allopurinoL..............  07- 07-    [facility] Embedded Care Due Messages. Reference number: [id number].   4/23/2025 5:20:43 PM CDT

## 2025-04-24 RX ORDER — BLOOD SUGAR DIAGNOSTIC
STRIP MISCELLANEOUS
Qty: 400 STRIP | Refills: 3 | Status: SHIPPED | OUTPATIENT
Start: 2025-04-24

## 2025-04-24 NOTE — TELEPHONE ENCOUNTER
Provider Staff:  Action required for this patient    Requires labs      Please see care gap opportunities below in Care Due Message.    Thanks!  [facility]     Appointments      Date Provider   Last Visit   4/10/2025 Kirk Wilson MD   Next Visit   7/21/2025 Kirk Wilson MD     Refill Decision Note   Lukasz Longonaya  is requesting a refill authorization.  Brief Assessment and Rationale for Refill:  Approve     Medication Therapy Plan:  FOVS      Comments:     Note composed:11:42 AM 04/24/2025

## 2025-04-28 ENCOUNTER — PATIENT MESSAGE (OUTPATIENT)
Dept: INTERNAL MEDICINE | Facility: CLINIC | Age: 70
End: 2025-04-28
Payer: MEDICARE

## 2025-04-28 DIAGNOSIS — Z00.00 ENCOUNTER FOR MEDICARE ANNUAL WELLNESS EXAM: ICD-10-CM

## 2025-04-28 DIAGNOSIS — E11.65 TYPE 2 DIABETES MELLITUS WITH HYPERGLYCEMIA, WITHOUT LONG-TERM CURRENT USE OF INSULIN: ICD-10-CM

## 2025-04-28 NOTE — TELEPHONE ENCOUNTER
No care due was identified.  [facility] Embedded Care Due Messages. Reference number: [id number].   4/28/2025 2:56:24 PM CDT

## 2025-04-29 RX ORDER — LANCETS
EACH MISCELLANEOUS
Qty: 100 EACH | Refills: 3 | Status: SHIPPED | OUTPATIENT
Start: 2025-04-29

## 2025-05-14 ENCOUNTER — OFFICE VISIT (OUTPATIENT)
Dept: DERMATOLOGY | Facility: CLINIC | Age: 70
End: 2025-05-14
Payer: MEDICARE

## 2025-05-14 DIAGNOSIS — L98.9 SKIN LESION: ICD-10-CM

## 2025-05-14 DIAGNOSIS — L57.0 MULTIPLE ACTINIC KERATOSES: Primary | ICD-10-CM

## 2025-05-14 DIAGNOSIS — L81.4 LENTIGINES: ICD-10-CM

## 2025-05-14 DIAGNOSIS — D18.01 CHERRY ANGIOMA: ICD-10-CM

## 2025-05-14 DIAGNOSIS — L82.1 SEBORRHEIC KERATOSES: ICD-10-CM

## 2025-05-14 PROCEDURE — 17000 DESTRUCT PREMALG LESION: CPT | Mod: PBBFAC,PO | Performed by: DERMATOLOGY

## 2025-05-14 PROCEDURE — 99214 OFFICE O/P EST MOD 30 MIN: CPT | Mod: 25,S$PBB,, | Performed by: DERMATOLOGY

## 2025-05-14 PROCEDURE — 17003 DESTRUCT PREMALG LES 2-14: CPT | Mod: PBBFAC,PO | Performed by: DERMATOLOGY

## 2025-05-14 PROCEDURE — 99213 OFFICE O/P EST LOW 20 MIN: CPT | Mod: PBBFAC,PO | Performed by: DERMATOLOGY

## 2025-05-14 PROCEDURE — 17003 DESTRUCT PREMALG LES 2-14: CPT | Mod: S$PBB,,, | Performed by: DERMATOLOGY

## 2025-05-14 PROCEDURE — 17000 DESTRUCT PREMALG LESION: CPT | Mod: S$PBB,,, | Performed by: DERMATOLOGY

## 2025-05-14 PROCEDURE — 99999 PR PBB SHADOW E&M-EST. PATIENT-LVL III: CPT | Mod: PBBFAC,,, | Performed by: DERMATOLOGY

## 2025-05-14 RX ORDER — FLUOROURACIL 50 MG/G
CREAM TOPICAL
Qty: 40 G | Refills: 3 | Status: SHIPPED | OUTPATIENT
Start: 2025-05-14

## 2025-05-14 NOTE — PROGRESS NOTES
Subjective:      Patient ID:  Lukasz Person is a 70 y.o. male who presents for   Chief Complaint   Patient presents with    Spot     Left arm    Follow-up     Follow up actinic keratoses, had a spot on his left arm last week which has peeled off.     Spot - Initial  Affected locations: face, left arm, right arm, chest, torso, back and abdomen  Aggravated by: nothing      Review of Systems   Constitutional:  Negative for fever, chills, weight loss, weight gain, fatigue, night sweats and malaise.   Skin:  Positive for daily sunscreen use and activity-related sunscreen use. Negative for itching, rash and wears hat.   Hematologic/Lymphatic: Does not bruise/bleed easily.       Objective:   Physical Exam   Constitutional: He appears well-developed and well-nourished. No distress.   Neurological: He is alert and oriented to person, place, and time. He is not disoriented.   Psychiatric: He has a normal mood and affect.   Skin:   Areas Examined (abnormalities noted in diagram):   Head / Face Inspection Performed  Neck Inspection Performed  Chest / Axilla Inspection Performed  RUE Inspected  LUE Inspection Performed  Nails and Digits Inspection Performed                 Diagram Legend     Erythematous scaling macule/papule c/w actinic keratosis       Vascular papule c/w angioma      Pigmented verrucoid papule/plaque c/w seborrheic keratosis      Yellow umbilicated papule c/w sebaceous hyperplasia      Irregularly shaped tan macule c/w lentigo     1-2 mm smooth white papules consistent with Milia      Movable subcutaneous cyst with punctum c/w epidermal inclusion cyst      Subcutaneous movable cyst c/w pilar cyst      Firm pink to brown papule c/w dermatofibroma      Pedunculated fleshy papule(s) c/w skin tag(s)      Evenly pigmented macule c/w junctional nevus     Mildly variegated pigmented, slightly irregular-bordered macule c/w mildly atypical nevus      Flesh colored to evenly pigmented papule c/w intradermal nevus        "Pink pearly papule/plaque c/w basal cell carcinoma      Erythematous hyperkeratotic cursted plaque c/w SCC      Surgical scar with no sign of skin cancer recurrence      Open and closed comedones      Inflammatory papules and pustules      Verrucoid papule consistent consistent with wart     Erythematous eczematous patches and plaques     Dystrophic onycholytic nail with subungual debris c/w onychomycosis     Umbilicated papule    Erythematous-base heme-crusted tan verrucoid plaque consistent with inflamed seborrheic keratosis     Erythematous Silvery Scaling Plaque c/w Psoriasis     See annotation      Assessment / Plan:        Multiple actinic keratoses   Cryosurgery Procedure Note    Verbal consent from the patient is obtained and the patient is aware of the precancerous quality and need for treatment of these lesions. Liquid nitrogen cryosurgery is applied to the 4 actinic keratoses, as detailed in the physical exam, to produce a freeze injury.    -     fluorouraciL (EFUDEX) 5 % cream; Use hs for 2 weeks  Dispense: 40 g; Refill: 3 use on lesions after they peel for 2 weeks     Skin lesion  -     Ambulatory referral/consult to Dermatology    Lentigines  The "ABCD" rules to observe pigmented lesions were reviewed.      Seborrheic keratoses  Seborrheic keratosis scattered, told benign no treatment needed.  Brochure provided.     Iniguez angiomas  This is a benign vascular lesion. Reassurance given. No treatment required.     Also states dry scalp, try cerave zinc shampoo           Follow up in about 3 months (around 8/14/2025).  "

## 2025-05-15 DIAGNOSIS — I48.0 PAROXYSMAL ATRIAL FIBRILLATION: ICD-10-CM

## 2025-05-15 RX ORDER — METOPROLOL SUCCINATE 25 MG/1
TABLET, EXTENDED RELEASE ORAL
Qty: 45 TABLET | Refills: 3 | Status: SHIPPED | OUTPATIENT
Start: 2025-05-15

## 2025-05-15 NOTE — TELEPHONE ENCOUNTER
Refill Routing Note   Medication(s) are not appropriate for processing by [facility] for the following reason(s):        Required vitals abnormal    [facility] action(s):  Defer   Requires labs : Yes             Appointments  past 12m or future 3m with PCP    Date Provider   Last Visit   4/10/2025 Kirk Wilson MD   Next Visit   7/21/2025 Kirk Wilson MD   ED visits in past 90 days: 0        Note composed:10:37 AM 05/15/2025

## 2025-05-15 NOTE — TELEPHONE ENCOUNTER
Care Due:                  Date            Visit Type   Department     Provider  --------------------------------------------------------------------------------                                EP -                              PRIMARY      [facility] INTERNAL  Last Visit: 04-      CARE ([facility])   CAREY Wilson                              EP -                              PRIMARY      [facility] INTERNAL  Next Visit: 07-      CARE ([facility])   CAREY Wilson                                                            Last  Test          Frequency    Reason                     Performed    Due Date  --------------------------------------------------------------------------------    HBA1C.......  6 months...  metFORMIN................  01- 07-    [employer] Embedded Care Due Messages. Reference number: [id number].   5/15/2025 6:32:15 AM CDT

## 2025-05-28 ENCOUNTER — TELEPHONE (OUTPATIENT)
Dept: PODIATRY | Facility: CLINIC | Age: 70
End: 2025-05-28
Payer: MEDICARE

## 2025-05-28 NOTE — TELEPHONE ENCOUNTER
Left detail message on voice mail . This is a confirmation of your upcoming appointment with our podiatry department. We look forward to seeing you on 6/3  1:15 pm  appointment  with Dr. Lopez [address] [place] [id number] . Please park behind the second grey building.      If you have any questions or need further assistance, please do not hesitate to contact our office at [phone]

## 2025-06-03 ENCOUNTER — OFFICE VISIT (OUTPATIENT)
Dept: INTERNAL MEDICINE | Facility: CLINIC | Age: 70
End: 2025-06-03
Payer: MEDICARE

## 2025-06-03 ENCOUNTER — OFFICE VISIT (OUTPATIENT)
Dept: PODIATRY | Facility: CLINIC | Age: 70
End: 2025-06-03
Payer: MEDICARE

## 2025-06-03 VITALS
HEIGHT: 72 IN | OXYGEN SATURATION: 98 % | SYSTOLIC BLOOD PRESSURE: 118 MMHG | BODY MASS INDEX: 28.25 KG/M2 | DIASTOLIC BLOOD PRESSURE: 68 MMHG | HEART RATE: 85 BPM | WEIGHT: 208.56 LBS

## 2025-06-03 VITALS
HEART RATE: 59 BPM | HEIGHT: 72 IN | BODY MASS INDEX: 29.62 KG/M2 | WEIGHT: 218.69 LBS | DIASTOLIC BLOOD PRESSURE: 76 MMHG | SYSTOLIC BLOOD PRESSURE: 134 MMHG

## 2025-06-03 DIAGNOSIS — I10 ESSENTIAL HYPERTENSION: Chronic | ICD-10-CM

## 2025-06-03 DIAGNOSIS — I70.0 AORTIC ATHEROSCLEROSIS: Chronic | ICD-10-CM

## 2025-06-03 DIAGNOSIS — I42.8 NON-ISCHEMIC CARDIOMYOPATHY: ICD-10-CM

## 2025-06-03 DIAGNOSIS — E11.42 DIABETIC POLYNEUROPATHY ASSOCIATED WITH TYPE 2 DIABETES MELLITUS: ICD-10-CM

## 2025-06-03 DIAGNOSIS — E66.9 OBESITY, DIABETES, AND HYPERTENSION SYNDROME: ICD-10-CM

## 2025-06-03 DIAGNOSIS — M25.512 LEFT SHOULDER PAIN, UNSPECIFIED CHRONICITY: ICD-10-CM

## 2025-06-03 DIAGNOSIS — G62.0 CHEMOTHERAPY-INDUCED NEUROPATHY: ICD-10-CM

## 2025-06-03 DIAGNOSIS — T45.1X5A CHEMOTHERAPY-INDUCED NEUROPATHY: ICD-10-CM

## 2025-06-03 DIAGNOSIS — E11.36 DIABETES MELLITUS WITH CATARACT: Chronic | ICD-10-CM

## 2025-06-03 DIAGNOSIS — E21.0 HYPERPARATHYROIDISM, PRIMARY: ICD-10-CM

## 2025-06-03 DIAGNOSIS — E11.59 OBESITY, DIABETES, AND HYPERTENSION SYNDROME: ICD-10-CM

## 2025-06-03 DIAGNOSIS — Z00.00 ENCOUNTER FOR MEDICARE ANNUAL WELLNESS EXAM: Primary | ICD-10-CM

## 2025-06-03 DIAGNOSIS — N52.01 ERECTILE DYSFUNCTION DUE TO ARTERIAL INSUFFICIENCY: ICD-10-CM

## 2025-06-03 DIAGNOSIS — E11.69 HYPERLIPIDEMIA ASSOCIATED WITH TYPE 2 DIABETES MELLITUS: Chronic | ICD-10-CM

## 2025-06-03 DIAGNOSIS — S46.012S TRAUMATIC TEAR OF LEFT ROTATOR CUFF, UNSPECIFIED TEAR EXTENT, SEQUELA: ICD-10-CM

## 2025-06-03 DIAGNOSIS — Z79.01 CHRONIC ANTICOAGULATION: ICD-10-CM

## 2025-06-03 DIAGNOSIS — K61.1 PERIRECTAL ABSCESS: ICD-10-CM

## 2025-06-03 DIAGNOSIS — M10.00 IDIOPATHIC GOUT, UNSPECIFIED CHRONICITY, UNSPECIFIED SITE: ICD-10-CM

## 2025-06-03 DIAGNOSIS — E78.5 HYPERLIPIDEMIA ASSOCIATED WITH TYPE 2 DIABETES MELLITUS: Chronic | ICD-10-CM

## 2025-06-03 DIAGNOSIS — D69.6 THROMBOCYTOPENIA: ICD-10-CM

## 2025-06-03 DIAGNOSIS — E29.1 MALE HYPOGONADISM: ICD-10-CM

## 2025-06-03 DIAGNOSIS — I25.10 CORONARY ARTERY DISEASE INVOLVING NATIVE CORONARY ARTERY OF NATIVE HEART WITHOUT ANGINA PECTORIS: Chronic | ICD-10-CM

## 2025-06-03 DIAGNOSIS — I15.2 OBESITY, DIABETES, AND HYPERTENSION SYNDROME: ICD-10-CM

## 2025-06-03 DIAGNOSIS — E11.69 OBESITY, DIABETES, AND HYPERTENSION SYNDROME: ICD-10-CM

## 2025-06-03 DIAGNOSIS — N13.8 BPH WITH URINARY OBSTRUCTION: ICD-10-CM

## 2025-06-03 DIAGNOSIS — E11.9 ENCOUNTER FOR DIABETIC FOOT EXAM: Primary | ICD-10-CM

## 2025-06-03 DIAGNOSIS — N40.1 BPH WITH URINARY OBSTRUCTION: ICD-10-CM

## 2025-06-03 DIAGNOSIS — G47.33 OSA ON CPAP: ICD-10-CM

## 2025-06-03 DIAGNOSIS — I50.20 HFREF (HEART FAILURE WITH REDUCED EJECTION FRACTION): Chronic | ICD-10-CM

## 2025-06-03 DIAGNOSIS — C15.9 ESOPHAGEAL ADENOCARCINOMA: Chronic | ICD-10-CM

## 2025-06-03 DIAGNOSIS — I48.0 PAROXYSMAL ATRIAL FIBRILLATION: Chronic | ICD-10-CM

## 2025-06-03 DIAGNOSIS — J38.3: ICD-10-CM

## 2025-06-03 PROBLEM — R26.89 IMBALANCE: Status: RESOLVED | Noted: 2023-08-25 | Resolved: 2025-06-03

## 2025-06-03 PROBLEM — D49.9 IMMUNODEFICIENCY SECONDARY TO NEOPLASM: Status: RESOLVED | Noted: 2023-03-30 | Resolved: 2025-06-03

## 2025-06-03 PROBLEM — Z95.828 PORT-A-CATH IN PLACE: Status: RESOLVED | Noted: 2023-05-15 | Resolved: 2025-06-03

## 2025-06-03 PROBLEM — F43.29 STRESS AND ADJUSTMENT REACTION: Status: RESOLVED | Noted: 2023-08-22 | Resolved: 2025-06-03

## 2025-06-03 PROBLEM — R53.81 PHYSICAL DECONDITIONING: Status: RESOLVED | Noted: 2023-08-22 | Resolved: 2025-06-03

## 2025-06-03 PROBLEM — R74.8 LOW SERUM ALKALINE PHOSPHATASE: Status: RESOLVED | Noted: 2021-06-22 | Resolved: 2025-06-03

## 2025-06-03 PROBLEM — D84.81 IMMUNODEFICIENCY SECONDARY TO NEOPLASM: Status: RESOLVED | Noted: 2023-03-30 | Resolved: 2025-06-03

## 2025-06-03 PROCEDURE — 99213 OFFICE O/P EST LOW 20 MIN: CPT | Mod: PBBFAC,27,PN | Performed by: PODIATRIST

## 2025-06-03 PROCEDURE — 99214 OFFICE O/P EST MOD 30 MIN: CPT | Mod: S$PBB,,, | Performed by: PODIATRIST

## 2025-06-03 PROCEDURE — 99215 OFFICE O/P EST HI 40 MIN: CPT | Mod: PBBFAC

## 2025-06-03 PROCEDURE — 99999 PR PBB SHADOW E&M-EST. PATIENT-LVL V: CPT | Mod: PBBFAC,,,

## 2025-06-03 PROCEDURE — 99999 PR PBB SHADOW E&M-EST. PATIENT-LVL III: CPT | Mod: PBBFAC,,, | Performed by: PODIATRIST

## 2025-06-27 RX ORDER — HYDROCHLOROTHIAZIDE 25 MG/1
25 TABLET ORAL
Qty: 90 TABLET | Refills: 0 | Status: SHIPPED | OUTPATIENT
Start: 2025-06-27

## 2025-07-09 ENCOUNTER — PATIENT MESSAGE (OUTPATIENT)
Dept: CARDIOLOGY | Facility: CLINIC | Age: 70
End: 2025-07-09
Payer: MEDICARE

## 2025-07-11 NOTE — PROGRESS NOTES
Oncology Nutrition Assessment for Medical Nutrition Therapy  Follow-up Visit    Lukasz Person   1955    Referring Provider: No ref. provider found      Reason for Visit: nutrition counseling and education    The patient location is: [place]  The chief complaint leading to consultation is: nutrition follow-up    Visit type: audiovisual    Face to Face time with patient: 19 minutes  25 minutes of total time spent on the encounter, which includes face to face time and non-face to face time preparing to see the patient (eg, review of tests), Obtaining and/or reviewing separately obtained history, Documenting clinical information in the electronic or other health record, Independently interpreting results (not separately reported) and communicating results to the patient/family/caregiver, or Care coordination (not separately reported).     Each patient to whom he or she provides medical services by telemedicine is:  (1) informed of the relationship between the physician and patient and the respective role of any other health care provider with respect to management of the patient; and (2) notified that he or she may decline to receive medical services by telemedicine and may withdraw from such care at any time.    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 He is known to me from  "previous appointments. He has actually gained some weight since our last visit ~a year ago. He reports he continues to eat well. He reports still working to learn how much he can eat at a meal. He reports food almost feeling like it won't go down but not necessarily swallowing issue and more a volume issue. He reports he does sometimes have to bring food back up. He also reports intense fatigue after eating too much at times. He continues to have a significant amount of "white foam" and saliva before meals. He denies coughing/choking/trouble breathing.     Weight: 99.2 kg (218 lb 11.1 oz) - 6/3 clinic weight  Height: 6' (182.9 cm)  BMI: 30    Usual BW: 260-265lb  Weight Change: 30lb loss over 3 months (15lb of which were lost after surgery); another 20lb loss over 1.5 months - since maintaining and actually regained ~10lb    Allergies: Patient has no known allergies.    Current Medications:  Current Outpatient Medications:     ACCU-CHEK ANTONELLA PLUS TEST STRP Strp, USE TO TEST BLOOD SUGAR FOUR TIMES DAILY OR AS DIRECTED, Disp: 400 strip, Rfl: 3    allopurinoL (ZYLOPRIM) 100 MG tablet, TAKE 1 TABLET BY MOUTH EVERY DAY, Disp: 90 tablet, Rfl: 3    blood-glucose meter kit, Checks blood sugars 1x/daily., Disp: 1 each, Rfl: 12    hydroCHLOROthiazide (HYDRODIURIL) 25 MG tablet, TAKE 1 TABLET(25 MG) BY MOUTH EVERY DAY, Disp: 90 tablet, Rfl: 0    lancets (ACCU-CHEK SOFTCLIX LANCETS) Misc, USE 1 EVERY DAY OR AS DIRECTED, Disp: 100 each, Rfl: 3    losartan (COZAAR) 25 MG tablet, TAKE 1 TABLET(25 MG) BY MOUTH TWICE DAILY, Disp: 180 tablet, Rfl: 1    metFORMIN (GLUCOPHAGE) 500 MG tablet, Take 1 tablet (500 mg total) by mouth daily with breakfast., Disp: 90 tablet, Rfl: 3    metoprolol succinate (TOPROL-XL) 25 MG 24 hr tablet, TAKE 1/2 TABLET(12.5 MG) BY MOUTH DAILY, Disp: 45 tablet, Rfl: 3    nitroGLYCERIN (NITROSTAT) 0.4 MG SL tablet, Place 1 tablet (0.4 mg total) under the tongue every 5 (five) minutes as needed for Chest pain. " If you need a third tablet, call 911, Disp: 100 tablet, Rfl: 3    omeprazole (PRILOSEC) 40 MG capsule, TAKE 1 CAPSULE(40 MG) BY MOUTH EVERY DAY, Disp: 90 capsule, Rfl: 3    rosuvastatin (CRESTOR) 20 MG tablet, TAKE 1 TABLET(20 MG) BY MOUTH EVERY EVENING, Disp: 90 tablet, Rfl: 3    tamsulosin (FLOMAX) 0.4 mg Cap, Take 1 capsule (0.4 mg total) by mouth once daily. 30 minutes after dinner, Disp: 90 capsule, Rfl: 3    testosterone (ANDROGEL) 20.25 mg/1.25 gram (1.62 %) GlPm, Apply 4 pumps to shoulders daily, Disp: 2 each, Rfl: 5    XARELTO 20 mg Tab, TAKE 1 TABLET BY MOUTH EVERY DAY WITH EVENING MEAL, Disp: 30 tablet, Rfl: 11    Food/medication interactions noted: none    Vitamins/Supplements: none    Labs: Reviewed    Nutrition Diagnosis    Problem: moderate malnutrition  Etiology (related to): appetite loss and early satiety  Signs/Symptoms (as evidenced by): reports of inadequate PO intake, weight loss, and both fat & muscle wasting present  Status: Resolved    Nutrition Intervention    Nutrition Prescription   2182kcals (22kcal/kg)  89g protein (0.9g/kg)   2182mL fluid (22mL/kg)    Recommendations:  Eat small frequent meals/snacks  Watch portion sizes, slow down pace of eating, take smaller bites, and chew well  Make meals/snacks high in calories and protein - examples discussed  Continue to drink at least 64oz fluid/day  Advised patient to monitor symptoms and reach out if they persist after eating smaller portion sizes    Materials Provided/Reviewed: none    Nutrition Monitoring and Evaluation    Monitor: diet education needs, energy intake, and weight status    Goals: weight maintenance    Follow up: yearly    Communication to referring provider/care team: note available in chart; discussed with NIRAV Zhang NP     Counseling time: 19 minutes      Carmen Clark MS, RD, LDN  Senior Clinical Dietitian  [facility] [place]

## 2025-07-14 ENCOUNTER — CLINICAL SUPPORT (OUTPATIENT)
Dept: HEMATOLOGY/ONCOLOGY | Facility: CLINIC | Age: 70
End: 2025-07-14
Payer: MEDICARE

## 2025-07-14 DIAGNOSIS — C15.9 ESOPHAGEAL ADENOCARCINOMA: ICD-10-CM

## 2025-07-14 DIAGNOSIS — Z71.3 NUTRITIONAL COUNSELING: Primary | ICD-10-CM

## 2025-07-14 PROCEDURE — 97803 MED NUTRITION INDIV SUBSEQ: CPT | Mod: 95,,, | Performed by: DIETITIAN, REGISTERED

## 2025-07-21 ENCOUNTER — OFFICE VISIT (OUTPATIENT)
Dept: INTERNAL MEDICINE | Facility: CLINIC | Age: 70
End: 2025-07-21
Payer: MEDICARE

## 2025-07-21 VITALS
BODY MASS INDEX: 29.26 KG/M2 | SYSTOLIC BLOOD PRESSURE: 106 MMHG | TEMPERATURE: 98 F | WEIGHT: 216.06 LBS | HEIGHT: 72 IN | DIASTOLIC BLOOD PRESSURE: 64 MMHG | HEART RATE: 53 BPM | OXYGEN SATURATION: 98 %

## 2025-07-21 DIAGNOSIS — C15.9 ESOPHAGEAL ADENOCARCINOMA: Chronic | ICD-10-CM

## 2025-07-21 DIAGNOSIS — I50.20 HFREF (HEART FAILURE WITH REDUCED EJECTION FRACTION): Chronic | ICD-10-CM

## 2025-07-21 DIAGNOSIS — I70.0 AORTIC ATHEROSCLEROSIS: Chronic | ICD-10-CM

## 2025-07-21 DIAGNOSIS — E11.36 DIABETES MELLITUS WITH CATARACT: Primary | Chronic | ICD-10-CM

## 2025-07-21 DIAGNOSIS — I48.0 PAROXYSMAL ATRIAL FIBRILLATION: Chronic | ICD-10-CM

## 2025-07-21 DIAGNOSIS — I25.10 CORONARY ARTERY DISEASE INVOLVING NATIVE CORONARY ARTERY OF NATIVE HEART WITHOUT ANGINA PECTORIS: Chronic | ICD-10-CM

## 2025-07-21 DIAGNOSIS — E78.5 HYPERLIPIDEMIA ASSOCIATED WITH TYPE 2 DIABETES MELLITUS: Chronic | ICD-10-CM

## 2025-07-21 DIAGNOSIS — G47.33 OSA (OBSTRUCTIVE SLEEP APNEA): Chronic | ICD-10-CM

## 2025-07-21 DIAGNOSIS — I10 ESSENTIAL HYPERTENSION: Chronic | ICD-10-CM

## 2025-07-21 DIAGNOSIS — E11.69 HYPERLIPIDEMIA ASSOCIATED WITH TYPE 2 DIABETES MELLITUS: Chronic | ICD-10-CM

## 2025-07-21 DIAGNOSIS — G47.39 OTHER SLEEP APNEA: ICD-10-CM

## 2025-07-21 DIAGNOSIS — E11.9 TYPE 2 DIABETES MELLITUS WITHOUT COMPLICATION, WITHOUT LONG-TERM CURRENT USE OF INSULIN: Chronic | ICD-10-CM

## 2025-07-21 DIAGNOSIS — E11.9 DM TYPE 2 WITHOUT RETINOPATHY: Chronic | ICD-10-CM

## 2025-07-21 PROCEDURE — 99214 OFFICE O/P EST MOD 30 MIN: CPT | Mod: PBBFAC,PO | Performed by: INTERNAL MEDICINE

## 2025-07-21 PROCEDURE — G2211 COMPLEX E/M VISIT ADD ON: HCPCS | Mod: ,,, | Performed by: INTERNAL MEDICINE

## 2025-07-21 PROCEDURE — 99999 PR PBB SHADOW E&M-EST. PATIENT-LVL IV: CPT | Mod: PBBFAC,,, | Performed by: INTERNAL MEDICINE

## 2025-07-21 PROCEDURE — 99214 OFFICE O/P EST MOD 30 MIN: CPT | Mod: S$PBB,,, | Performed by: INTERNAL MEDICINE

## 2025-07-21 RX ORDER — METFORMIN HYDROCHLORIDE 500 MG/1
500 TABLET ORAL 2 TIMES DAILY WITH MEALS
Qty: 180 TABLET | Refills: 3 | Status: SHIPPED | OUTPATIENT
Start: 2025-07-21 | End: 2025-07-22

## 2025-07-21 NOTE — PROGRESS NOTES
Assessment:       1. Diabetes mellitus with cataract  - Comprehensive Metabolic Panel; Future  - TSH; Future  - Lipid Panel; Future  - Hemoglobin A1C; Future  - Microalbumin/Creatinine Ratio, Urine; Future  - metFORMIN (GLUCOPHAGE) 500 MG tablet; Take 1 tablet (500 mg total) by mouth 2 (two) times daily with meals.  Dispense: 180 tablet; Refill: 3    2. Essential hypertension  - CBC Auto Differential; Future    3. Hyperlipidemia associated with type 2 diabetes mellitus    4. Aortic atherosclerosis    5. Coronary artery disease involving native coronary artery of native heart without angina pectoris    6. HFrEF (heart failure with reduced ejection fraction)    7. Paroxysmal atrial fibrillation    8. Esophageal adenocarcinoma    9. KUMAR (obstructive sleep apnea)    10. Other sleep apnea  - Home Sleep Study; Future    11. Type 2 diabetes mellitus without complication, without long-term current use of insulin  - metFORMIN (GLUCOPHAGE) 500 MG tablet; Take 1 tablet (500 mg total) by mouth 2 (two) times daily with meals.  Dispense: 180 tablet; Refill: 3    12. DM type 2 without retinopathy  - metFORMIN (GLUCOPHAGE) 500 MG tablet; Take 1 tablet (500 mg total) by mouth 2 (two) times daily with meals.  Dispense: 180 tablet; Refill: 3  - Home Sleep Study; Future        Plan:       1/11/12. Continue metformin 500 mg   2.  Continue HCTZ 25 mg, Toprol-XL 25 mg, losartan 25 mg   3/4/5.  Continue Crestor 20 mg, Toprol-XL 25 mg.  6. Continue Crestor 20 mg, Toprol-XL 25 mg, losartan 25 mg   7.  Continue Toprol-XL 25 mg   8. Continue to follow with GI.    9/10.  Check home sleep study.    Deep Scribe:  IMPRESSION:  1. Increased metformin from 500 mg daily to 500 mg twice daily based on slightly elevated home glucose readings.  2. Determined need for repeat sleep study due to significant weight loss since last assessment in 2020.  3. Prioritized sleep study before neurological evaluation for reported memory issues.  4. Assessed  cardiovascular risk factors and current medication regimen for HTN, HLD, atherosclerosis, CAD, heart failure, and a-fib.  5. Discussed relationship between sleep apnea and memory issues, emphasizing importance of addressing sleep disorders before considering neurological causes.    SUMMARY:   Ordered urinalysis for protein   Ordered labs: sugar, A1C, CBC, electrolytes, kidney and liver function, thyroid, cholesterol   Increased metformin to 500 mg twice daily   Ordered home sleep study   Mr. Person to continue elevating head while sleeping for reflux management   Follow up in 6 months unless condition changes or patient becomes ill    ESOPHAGEAL ADENOCARCINOMA:   Mr. Person to continue elevating head while sleeping to manage reflux.    TYPE 2 DIABETES MELLITUS WITHOUT COMPLICATION, WITHOUT LONG-TERM CURRENT USE OF INSULIN:   Increased metformin from 500 mg daily to 500 mg twice daily based on slightly elevated home glucose readings.   Ordered blood sugar and A1C.   Ordered urinalysis for protein.    ESSENTIAL HYPERTENSION:   Mr. Person to check BP once or twice weekly, preferably an hour after taking medications or in the evening after relaxing for 10-15 minutes.    HYPERLIPIDEMIA ASSOCIATED WITH TYPE 2 DIABETES MELLITUS:   Ordered cholesterol.    KUMAR (OBSTRUCTIVE SLEEP APNEA):   Explained potential health risks associated with untreated sleep apnea, including impacts on cardiovascular health, cognitive function, and acid reflux.   Ordered home sleep study.   Mr. Person will be contacted in the next couple of weeks to set up the home sleep study.    OTHER SLEEP APNEA:   Explained potential health risks associated with untreated sleep apnea, including impacts on cardiovascular health, cognitive function, and acid reflux.   Ordered home sleep study.   Mr. Person will be contacted in the next couple of weeks to set up the home sleep study.    GENERAL/MULTIPLE:   Ordered CBC, electrolytes, kidney and liver function tests,  thyroid.   Follow up in 6 months unless condition changes or patient becomes ill.                 This note was generated with the assistance of ambient listening technology. Verbal consent was obtained by the patient and accompanying visitor(s) for the recording of patient appointment to facilitate this note. I attest to having reviewed and edited the generated note for accuracy, though some syntax or spelling errors may persist. Please contact the author of this note for any clarification.       Subjective:       Patient ID: Lukasz Person is a 70 y.o. male.    Chief Complaint: Follow-up    HPI    70 y.o. male here for follow-up.    Patient has diabetes on metformin 500 mg daily.  Lab Results   Component Value Date    HGBA1C 7.2 (H) 01/28/2025    HGBA1C 6.8 (H) 10/11/2024    HGBA1C 6.8 (H) 04/01/2024     Lab Results   Component Value Date    LDLCALC 37.0 (L) 01/10/2025    CREATININE 1.0 04/02/2025       Patient has hypertension on HCTZ 25 mg, Toprol-XL 25 mg, losartan 25 mg.    Patient has hyperlipidemia, aortic atherosclerosis, coronary artery disease on Crestor 20 mg, Toprol-XL 25 mg.    Patient has heart failure reduced ejection fraction on Crestor 20 mg, Toprol-XL 25 mg, losartan 25 mg.    Patient has paroxysmal atrial fibrillation on Toprol-XL 25 mg.    Patient has not esophageal adenocarcinoma being followed by GI.    Patient has sleep apnea on CPAP.    History of Present Illness    CHIEF COMPLAINT:  Mr. Person presents today for follow-up    DIABETES:  He reports consistent home glucose monitoring, primarily checking fasting blood sugars before breakfast which are noted to be slightly elevated. He currently takes Metformin daily.    SLEEP APNEA AND GERD:  He has a history of sleep apnea diagnosed prior to 2020, with last sleep study performed in 2020. He is not consistently using prescribed CPAP device, instead using pillow elevation while sleeping, though tends to slide down during the night. He reports  "significant improvement in sleep apnea symptoms following a 70-pound weight loss after esophageal cancer treatment. His wife would notify him of any ongoing snoring or sleep disturbances. He has a history of GERD diagnosed 23-25 years ago, previously treated with Nexium, with a healthcare provider suggesting potential correlation between sleep apnea and GERD symptoms.    BLOOD PRESSURE:  He is not currently checking blood pressure at home but plans to resume monitoring with a digital device once or twice weekly.    MEMORY:  He reports progressive memory decline recently. He describes historically maintaining good memory but currently perceives significant memory impairment, describing his current memory as "horrible" compared to previous capabilities.      ROS:  Neurological: +memory loss         Review of Systems          Objective:      Physical Exam  Vitals reviewed.   Constitutional:       Appearance: He is well-developed.   HENT:      Head: Normocephalic and atraumatic.      Mouth/Throat:      Pharynx: No oropharyngeal exudate.   Eyes:      General: No scleral icterus.        Right eye: No discharge.         Left eye: No discharge.      Pupils: Pupils are equal, round, and reactive to light.   Neck:      Thyroid: No thyromegaly.      Trachea: No tracheal deviation.   Cardiovascular:      Rate and Rhythm: Normal rate and regular rhythm.      Heart sounds: Normal heart sounds. No murmur heard.     No friction rub. No gallop.   Pulmonary:      Effort: Pulmonary effort is normal. No respiratory distress.      Breath sounds: Normal breath sounds. No wheezing or rales.   Chest:      Chest wall: No tenderness.   Abdominal:      General: Bowel sounds are normal. There is no distension.      Palpations: Abdomen is soft. There is no mass.      Tenderness: There is no abdominal tenderness. There is no guarding or rebound.   Musculoskeletal:         General: No tenderness. Normal range of motion.      Cervical back: Normal " range of motion and neck supple.   Skin:     General: Skin is warm and dry.      Coloration: Skin is not pale.      Findings: No erythema or rash.   Neurological:      Mental Status: He is alert and oriented to person, place, and time.   Psychiatric:         Behavior: Behavior normal.

## 2025-07-22 ENCOUNTER — LAB VISIT (OUTPATIENT)
Dept: LAB | Facility: HOSPITAL | Age: 70
End: 2025-07-22
Attending: INTERNAL MEDICINE
Payer: MEDICARE

## 2025-07-22 ENCOUNTER — RESULTS FOLLOW-UP (OUTPATIENT)
Dept: INTERNAL MEDICINE | Facility: CLINIC | Age: 70
End: 2025-07-22
Payer: MEDICARE

## 2025-07-22 DIAGNOSIS — E11.36 DIABETES MELLITUS WITH CATARACT: Chronic | ICD-10-CM

## 2025-07-22 DIAGNOSIS — I10 ESSENTIAL HYPERTENSION: ICD-10-CM

## 2025-07-22 DIAGNOSIS — E11.9 DM TYPE 2 WITHOUT RETINOPATHY: Chronic | ICD-10-CM

## 2025-07-22 DIAGNOSIS — E11.9 TYPE 2 DIABETES MELLITUS WITHOUT COMPLICATION, WITHOUT LONG-TERM CURRENT USE OF INSULIN: Chronic | ICD-10-CM

## 2025-07-22 LAB
ABSOLUTE EOSINOPHIL (OHS): 0.14 K/UL
ABSOLUTE MONOCYTE (OHS): 0.41 K/UL (ref 0.3–1)
ABSOLUTE NEUTROPHIL COUNT (OHS): 2.79 K/UL (ref 1.8–7.7)
ALBUMIN SERPL BCP-MCNC: 4.2 G/DL (ref 3.5–5.2)
ALP SERPL-CCNC: 69 UNIT/L (ref 40–150)
ALT SERPL W/O P-5'-P-CCNC: 23 UNIT/L (ref 10–44)
ANION GAP (OHS): 10 MMOL/L (ref 8–16)
AST SERPL-CCNC: 25 UNIT/L (ref 11–45)
BASOPHILS # BLD AUTO: 0.03 K/UL
BASOPHILS NFR BLD AUTO: 0.7 %
BILIRUB SERPL-MCNC: 0.7 MG/DL (ref 0.1–1)
BUN SERPL-MCNC: 13 MG/DL (ref 8–23)
CALCIUM SERPL-MCNC: 9.3 MG/DL (ref 8.7–10.5)
CHLORIDE SERPL-SCNC: 104 MMOL/L (ref 95–110)
CHOLEST SERPL-MCNC: 102 MG/DL (ref 120–199)
CHOLEST/HDLC SERPL: 1.9 {RATIO} (ref 2–5)
CO2 SERPL-SCNC: 29 MMOL/L (ref 23–29)
CREAT SERPL-MCNC: 1.2 MG/DL (ref 0.5–1.4)
EAG (OHS): 166 MG/DL (ref 68–131)
ERYTHROCYTE [DISTWIDTH] IN BLOOD BY AUTOMATED COUNT: 13.6 % (ref 11.5–14.5)
GFR SERPLBLD CREATININE-BSD FMLA CKD-EPI: >60 ML/MIN/1.73/M2
GLUCOSE SERPL-MCNC: 137 MG/DL (ref 70–110)
HBA1C MFR BLD: 7.4 % (ref 4–5.6)
HCT VFR BLD AUTO: 42 % (ref 40–54)
HDLC SERPL-MCNC: 53 MG/DL (ref 40–75)
HDLC SERPL: 52 % (ref 20–50)
HGB BLD-MCNC: 13.7 GM/DL (ref 14–18)
IMM GRANULOCYTES # BLD AUTO: 0.02 K/UL (ref 0–0.04)
IMM GRANULOCYTES NFR BLD AUTO: 0.5 % (ref 0–0.5)
LDLC SERPL CALC-MCNC: 31 MG/DL (ref 63–159)
LYMPHOCYTES # BLD AUTO: 0.79 K/UL (ref 1–4.8)
MCH RBC QN AUTO: 30.6 PG (ref 27–31)
MCHC RBC AUTO-ENTMCNC: 32.6 G/DL (ref 32–36)
MCV RBC AUTO: 94 FL (ref 82–98)
NONHDLC SERPL-MCNC: 49 MG/DL
NUCLEATED RBC (/100WBC) (OHS): 0 /100 WBC
PLATELET # BLD AUTO: 114 K/UL (ref 150–450)
PMV BLD AUTO: 11.2 FL (ref 9.2–12.9)
POTASSIUM SERPL-SCNC: 4.7 MMOL/L (ref 3.5–5.1)
PROT SERPL-MCNC: 7.2 GM/DL (ref 6–8.4)
RBC # BLD AUTO: 4.48 M/UL (ref 4.6–6.2)
RELATIVE EOSINOPHIL (OHS): 3.3 %
RELATIVE LYMPHOCYTE (OHS): 18.9 % (ref 18–48)
RELATIVE MONOCYTE (OHS): 9.8 % (ref 4–15)
RELATIVE NEUTROPHIL (OHS): 66.8 % (ref 38–73)
SODIUM SERPL-SCNC: 143 MMOL/L (ref 136–145)
TRIGL SERPL-MCNC: 90 MG/DL (ref 30–150)
TSH SERPL-ACNC: 1.59 UIU/ML (ref 0.4–4)
WBC # BLD AUTO: 4.18 K/UL (ref 3.9–12.7)

## 2025-07-22 PROCEDURE — 82040 ASSAY OF SERUM ALBUMIN: CPT

## 2025-07-22 PROCEDURE — 80061 LIPID PANEL: CPT

## 2025-07-22 PROCEDURE — 36415 COLL VENOUS BLD VENIPUNCTURE: CPT | Mod: PO

## 2025-07-22 PROCEDURE — 83036 HEMOGLOBIN GLYCOSYLATED A1C: CPT

## 2025-07-22 PROCEDURE — 84443 ASSAY THYROID STIM HORMONE: CPT

## 2025-07-22 PROCEDURE — 85025 COMPLETE CBC W/AUTO DIFF WBC: CPT

## 2025-07-22 RX ORDER — METFORMIN HYDROCHLORIDE 1000 MG/1
1000 TABLET ORAL 2 TIMES DAILY WITH MEALS
Qty: 180 TABLET | Refills: 3 | Status: SHIPPED | OUTPATIENT
Start: 2025-07-22 | End: 2026-07-22

## 2025-07-23 ENCOUNTER — HOSPITAL ENCOUNTER (OUTPATIENT)
Dept: SLEEP MEDICINE | Facility: OTHER | Age: 70
Discharge: HOME | End: 2025-07-23
Attending: INTERNAL MEDICINE
Payer: MEDICARE

## 2025-07-23 DIAGNOSIS — G47.39 OTHER SLEEP APNEA: ICD-10-CM

## 2025-07-23 DIAGNOSIS — E11.9 DM TYPE 2 WITHOUT RETINOPATHY: Chronic | ICD-10-CM

## 2025-07-23 PROCEDURE — 95800 SLP STDY UNATTENDED: CPT

## 2025-07-24 PROBLEM — E11.9 DM TYPE 2 WITHOUT RETINOPATHY: Status: ACTIVE | Noted: 2025-07-24

## 2025-07-24 NOTE — TELEPHONE ENCOUNTER
"Sent pt a message in [employer] re:     "Urine is negative for protein. Your blood counts are normal. Your electrolytes, kidney/liver function, cholesterol are normal. A1c indicates worsening control diabetes. Let us increase the metformin to 1000 mg twice daily. Please schedule an appointment for three-months."    I was able to get you scheduled in three-months with Dr. Wilson for Friday, October 24, 2025 at 8:40 AM. Please let me know if this date and time work for you.  "

## 2025-07-29 ENCOUNTER — TELEPHONE (OUTPATIENT)
Dept: CARDIOLOGY | Facility: CLINIC | Age: 70
End: 2025-07-29
Payer: MEDICARE

## 2025-07-29 ENCOUNTER — PATIENT MESSAGE (OUTPATIENT)
Dept: CARDIOLOGY | Facility: CLINIC | Age: 70
End: 2025-07-29
Payer: MEDICARE

## 2025-07-30 ENCOUNTER — OFFICE VISIT (OUTPATIENT)
Dept: CARDIOLOGY | Facility: CLINIC | Age: 70
End: 2025-07-30
Payer: MEDICARE

## 2025-07-30 VITALS
WEIGHT: 212.31 LBS | BODY MASS INDEX: 28.76 KG/M2 | SYSTOLIC BLOOD PRESSURE: 114 MMHG | OXYGEN SATURATION: 98 % | DIASTOLIC BLOOD PRESSURE: 67 MMHG | HEART RATE: 51 BPM | HEIGHT: 72 IN

## 2025-07-30 DIAGNOSIS — Z79.01 CHRONIC ANTICOAGULATION: ICD-10-CM

## 2025-07-30 DIAGNOSIS — I25.10 CORONARY ARTERY DISEASE INVOLVING NATIVE CORONARY ARTERY OF NATIVE HEART WITHOUT ANGINA PECTORIS: Chronic | ICD-10-CM

## 2025-07-30 DIAGNOSIS — I10 ESSENTIAL HYPERTENSION: Chronic | ICD-10-CM

## 2025-07-30 DIAGNOSIS — I48.0 PAROXYSMAL ATRIAL FIBRILLATION: Primary | Chronic | ICD-10-CM

## 2025-07-30 DIAGNOSIS — I70.0 AORTIC ATHEROSCLEROSIS: Chronic | ICD-10-CM

## 2025-07-30 DIAGNOSIS — E78.5 HYPERLIPIDEMIA ASSOCIATED WITH TYPE 2 DIABETES MELLITUS: Chronic | ICD-10-CM

## 2025-07-30 DIAGNOSIS — E11.69 HYPERLIPIDEMIA ASSOCIATED WITH TYPE 2 DIABETES MELLITUS: Chronic | ICD-10-CM

## 2025-07-30 PROCEDURE — 99214 OFFICE O/P EST MOD 30 MIN: CPT | Mod: PBBFAC | Performed by: PHYSICIAN ASSISTANT

## 2025-07-30 PROCEDURE — 99214 OFFICE O/P EST MOD 30 MIN: CPT | Mod: S$PBB,,, | Performed by: PHYSICIAN ASSISTANT

## 2025-07-30 PROCEDURE — 99999 PR PBB SHADOW E&M-EST. PATIENT-LVL IV: CPT | Mod: PBBFAC,,, | Performed by: PHYSICIAN ASSISTANT

## 2025-07-30 NOTE — PROGRESS NOTES
Cardiology Clinic Note  Reason for Visit: F/u paroxysmal afib  General Cardiologist: Dr. Nick     HPI:     Problem List and HPI:   Non-obstructive CAD  - 50% PDA per [facility] (9/2020)  Mildly reduced LV systolic function  Diabetes mellitus since his late 50s/early 60s  Hypertension since his late 50s/early 60s  Mixed hyperlipidemia  Paroxysmal atrial fibrillation in setting of pyelonephritis 4/2022  KUMAR - 2006  Lithotripsy  Parathyroidectomy 2017  7 pack year h/o smoking, quit in 1972  Esophageal cancer 3/2023      Lukasz Person is a 70 y.o. M, who presents for routine follow up.     History of Present Illness    He reports experiencing decreased energy and increased fatigue, feeling tired more frequently, which he partially attributes to the extreme heat. He limits his morning dog walks to 30 minutes due to heat and reduced exercise tolerance, recognizing he would benefit from an additional 15-20 minutes of walking. He denies significant dizziness, reporting only rare light-headedness occasionally.  He has a history of AF that developed following pyelonephritis. A 24-hour Holter monitor performed shortly after hospitalization confirmed persistent AF. He underwent a cardiac angiogram in 2020, which demonstrated 50% stenosis in the PDA. Recent exercise stress test in January showed normal results. He has not been checking BP at home for 6 months. He denies current cardiac symptoms or chest discomfort. He reports a history of significant weight loss during cancer treatment. Current weight is 212 lbs, which is down from 218 lbs in June. He notes the weight loss as a positive outcome from his cancer experience, which has also contributed to improvement in glucose levels and reduction in diabetic medications. He is currently taking Xarelto 20 mg, previously switched between Xarelto and Coumadin due to cost considerations. He has been prescribed nitroglycerin with four bottles available but has never used the  medication. Lipid panel from July 22 showed LDL 31 (goal <55), HDL 53 (WNL for males, above 50), and triglycerides improved from 110 to 90. Overall lipid panel was noted as best in past 2 years. Additional bloodwork shows normal red and WBC counts.       ROS:    Pertinent ROS included in HPI and below.  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 Tbou  referral: Dr. Wilson  Pt. on Xarelto--tb-ok to hold see te 8/15/23 dr vu  10/13-precall complete-MS  10/19 pt rescheduled, Xarelto hold, PEG, portal -ml  10/24-precall complete-KPvt    CORONARY ANGIOGRAPHY N/A 9/30/2020    Procedure: ANGIOGRAM, CORONARY ARTERY;  Surgeon: John West MD;  Location: [facility] CATH LAB;  Service: Cardiology;  Laterality: N/A;    CYSTOSCOPY N/A 2/17/2022    Procedure: CYSTOSCOPY;  Surgeon: Lukasz Hughes MD;  Location: [address];  Service: Urology;  Laterality: N/A;    CYSTOSCOPY W/ URETERAL STENT PLACEMENT N/A 2/25/2022    Procedure: CYSTOSCOPY, WITH URETERAL STENT INSERTION;  Surgeon: Lukasz Hughes MD;  Location: [facility] OR [address] [place] [address];  Service: Urology;  Laterality: N/A;    ENDOSCOPIC ULTRASOUND OF UPPER GASTROINTESTINAL TRACT N/A 12/7/2022    Procedure: ULTRASOUND, UPPER GI TRACT, ENDOSCOPIC;  Surgeon: Darian Main MD;   Location: [facility] ENDO;  Service: Endoscopy;  Laterality: N/A;  Approval to hold Xarelto rec'd from Dr. Nick (see t/e 12/5/22)-DS    ESOPHAGOGASTRODUODENOSCOPY N/A 11/17/2022    Procedure: EGD (ESOPHAGOGASTRODUODENOSCOPY);  Surgeon: Brody Gonzales MD;  Location: [facility] ENDO (2ND FLR);  Service: Endoscopy;  Laterality: N/A;  inst via email-ok to hold Xarelto x 2 days-MS    ESOPHAGOGASTRODUODENOSCOPY N/A 5/31/2023    Procedure: EGD (ESOPHAGOGASTRODUODENOSCOPY) WITH DILATION;  Surgeon: Santana Brown Jr., MD;  Location: [facility] OR 2ND FLR;  Service: General;  Laterality: N/A;    ESOPHAGOGASTRODUODENOSCOPY N/A 5/3/2024    Procedure: EGD (ESOPHAGOGASTRODUODENOSCOPY) with botox injection;  Surgeon: Santana Brown Jr., MD;  [facility];  Service: General;  Laterality: N/A;    INJECTION OF BOTULINUM TOXIN TYPE A  5/3/2024    Procedure: INJECTION, BOTULINUM TOXIN, TYPE A;  Surgeon: Santana Brown Jr., MD;  [facility];  Service: General;;    LASER LITHOTRIPSY Left 2/25/2022    Procedure: LITHOTRIPSY, USING LASER;  Surgeon: Lukasz Hughes MD;  [facility];  Service: Urology;  Laterality: Left;    LEFT HEART CATHETERIZATION Left 9/30/2020    Procedure: Left heart cath;  Surgeon: John West MD;  Location: [facility] CATH LAB;  Service: Cardiology;  Laterality: Left;    LITHOTRIPSY      PARATHYROIDECTOMY  1/1/2-107    PYELOSCOPY Left 2/25/2022    Procedure: PYELOSCOPY;  Surgeon: Lukasz Hughes MD;  Location: [facility] OR 1ST FLR;  Service: Urology;  Laterality: Left;    ROBOT-ASSISTED SURGICAL REMOVAL OF ESOPHAGUS USING DA CARLOS XI N/A 3/14/2023    Procedure: XI ROBOTIC ESOPHAGECTOMY;  Surgeon: Santana Brown Jr., MD;  [facility];  Service: General;  Laterality: N/A;  Abdomen, Chest and Neck    ROBOTIC JEJUNOSTOMY N/A 3/14/2023    Procedure: ROBOTIC JEJUNOSTOMY TUBE INSERTION;  Surgeon: Santana Brown Jr., MD;  Location: [facility] OR [address] [place] [address];  Service: General;   Laterality: N/A;    TRANSESOPHAGEAL ECHOCARDIOGRAPHY N/A 4/7/2022    Procedure: ECHOCARDIOGRAM, TRANSESOPHAGEAL;  Surgeon: Xander Diagnostic Provider;  Location: [facility] EP LAB;  Service: Cardiology;  Laterality: N/A;    TREATMENT OF CARDIAC ARRHYTHMIA N/A 4/7/2022    Procedure: Cardioversion or Defibrillation;  Surgeon: Iris Fisher NP;  Location: [facility] EP LAB;  Service: Cardiology;  Laterality: N/A;  afib, dccv, artie, anes, EH, 3prep    URETEROSCOPIC REMOVAL OF URETERIC CALCULUS Left 2/25/2022    Procedure: REMOVAL, CALCULUS, URETER, URETEROSCOPIC;  Surgeon: Lukasz Hughes MD;  Location: [facility] OR 1ST FLR;  Service: Urology;  Laterality: Left;    URETEROSCOPY Left 2/25/2022    Procedure: URETEROSCOPY;  Surgeon: Lukasz Hughes MD;  Location: [facility] OR 1ST FLR;  Service: Urology;  Laterality: Left;    VOCAL CORD INJECTION Left 3/17/2023    Procedure: INJECTION, VOCAL CORD, LARYNGOSCOPIC;  Surgeon: Gm Altman MD;  Location: [facility] OR 2ND FLR;  Service: ENT;  Laterality: Left;     Allergies:   Review of patient's allergies indicates:  No Known Allergies  Medications:     Current Outpatient Medications on File Prior to Visit   Medication Sig Dispense Refill    ACCU-CHEK ANTONELLA PLUS TEST STRP Strp USE TO TEST BLOOD SUGAR FOUR TIMES DAILY OR AS DIRECTED 400 strip 3    allopurinoL (ZYLOPRIM) 100 MG tablet TAKE 1 TABLET BY MOUTH EVERY DAY 90 tablet 3    blood-glucose meter kit Checks blood sugars 1x/daily. 1 each 12    hydroCHLOROthiazide (HYDRODIURIL) 25 MG tablet TAKE 1 TABLET(25 MG) BY MOUTH EVERY DAY 90 tablet 0    lancets (ACCU-CHEK SOFTCLIX LANCETS) Misc USE 1 EVERY DAY OR AS DIRECTED 100 each 3    losartan (COZAAR) 25 MG tablet TAKE 1 TABLET(25 MG) BY MOUTH TWICE DAILY 180 tablet 1    metFORMIN (GLUCOPHAGE) 1000 MG tablet Take 1 tablet (1,000 mg total) by mouth 2 (two) times daily with meals. 180 tablet 3    metoprolol succinate (TOPROL-XL) 25 MG 24 hr tablet TAKE 1/2 TABLET(12.5 MG) BY MOUTH DAILY 45 tablet  3    nitroGLYCERIN (NITROSTAT) 0.4 MG SL tablet Place 1 tablet (0.4 mg total) under the tongue every 5 (five) minutes as needed for Chest pain. If you need a third tablet, call 911 100 tablet 3    omeprazole (PRILOSEC) 40 MG capsule TAKE 1 CAPSULE(40 MG) BY MOUTH EVERY DAY 90 capsule 3    rosuvastatin (CRESTOR) 20 MG tablet TAKE 1 TABLET(20 MG) BY MOUTH EVERY EVENING 90 tablet 3    tamsulosin (FLOMAX) 0.4 mg Cap Take 1 capsule (0.4 mg total) by mouth once daily. 30 minutes after dinner 90 capsule 3    testosterone (ANDROGEL) 20.25 mg/1.25 gram (1.62 %) GlPm Apply 4 pumps to shoulders daily 2 each 5    XARELTO 20 mg Tab TAKE 1 TABLET BY MOUTH EVERY DAY WITH EVENING MEAL 30 tablet 11     No current facility-administered medications on file prior to visit.     Social History:     Social History     Tobacco Use    Smoking status: Former     Current packs/day: 0.00     Average packs/day: 1 pack/day for 22.7 years (22.7 ttl pk-yrs)     Types: Cigarettes, Cigars     Start date: 1972     Quit date:      Years since quittin.2     Passive exposure: Never    Smokeless tobacco: Never    Tobacco comments:     smoking was off and on.  cumulative 7 years.  never more than three years in one stretch or more lj   Substance Use Topics    Alcohol use: Yes     Alcohol/week: 4.0 standard drinks of alcohol     Types: 4 Shots of liquor per week     Family History:     Family History   Problem Relation Name Age of Onset    Arthritis Mother Juany     Heart disease Father Diomedes 70        CABG    Arthritis Father Diomedes     Diabetes Father Diomedes     Kidney disease Father Diomedes         had one kidney removed in early thirties    Arthritis Sister Dee     Arthritis Sister Josette     Hypertension Sister Delores     Colon cancer Brother Sterling 32    Arthritis Brother Diomedes     Alcohol abuse Paternal Aunt Betina     Cancer Maternal Grandfather Yaron         throat cancer    Diabetes Paternal Grandmother Cassandra      Colon polyps Paternal Grandfather Diomedes     Colon cancer Paternal Grandfather Diomedes     Cancer Paternal Grandfather Diomedes         colon cancer at age 62     Physical Exam:   /67   Pulse (!) 51   Ht 6' (1.829 m)   Wt 96.3 kg (212 lb 4.9 oz)   SpO2 98%   BMI 28.79 kg/m²      Physical Exam  Vitals and nursing note reviewed.   Constitutional:       Appearance: Normal appearance.   HENT:      Head: Normocephalic and atraumatic.   Neck:      Vascular: Normal carotid pulses. No carotid bruit or hepatojugular reflux.   Cardiovascular:      Rate and Rhythm: Normal rate and regular rhythm.      Chest Wall: PMI is not displaced.      Pulses:           Carotid pulses are 2+ on the right side and 2+ on the left side.       Radial pulses are 2+ on the right side and 2+ on the left side.        Dorsalis pedis pulses are 2+ on the right side and 2+ on the left side.        Posterior tibial pulses are 2+ on the right side and 2+ on the left side.   Pulmonary:      Effort: Pulmonary effort is normal.      Breath sounds: Normal breath sounds. No wheezing, rhonchi or rales.   Abdominal:      General: Bowel sounds are normal. There is no abdominal bruit.      Palpations: Abdomen is soft. There is no pulsatile mass.      Tenderness: There is no abdominal tenderness.   Feet:      Right foot:      Skin integrity: Skin integrity normal.      Left foot:      Skin integrity: Skin integrity normal.   Skin:     Capillary Refill: Capillary refill takes less than 2 seconds.   Neurological:      General: No focal deficit present.      Mental Status: He is alert.   Psychiatric:         Mood and Affect: Mood and affect normal.         Speech: Speech normal.         Behavior: Behavior is cooperative.         Thought Content: Thought content normal.          Labs:     Blood Tests:  Lab Results   Component Value Date     07/22/2025     01/28/2025    K 4.7 07/22/2025    K 3.8 01/28/2025     07/22/2025      01/28/2025    CO2 29 07/22/2025    CO2 28 01/28/2025    BUN 13 07/22/2025    CREATININE 1.2 07/22/2025     (H) 07/22/2025     (H) 01/28/2025    HGBA1C 7.4 (H) 07/22/2025    HGBA1C 7.2 (H) 01/28/2025    MG 2.0 04/02/2025    AST 25 07/22/2025    AST 21 01/28/2025    ALT 23 07/22/2025    ALT 21 01/28/2025    ALBUMIN 4.2 07/22/2025    ALBUMIN 3.9 01/28/2025    PROT 7.2 07/22/2025    PROT 7.0 01/28/2025    BILITOT 0.7 07/22/2025    BILITOT 0.5 01/28/2025    WBC 4.18 07/22/2025    HGB 13.7 (L) 07/22/2025    HGB 13.7 (L) 01/10/2025    HCT 42.0 07/22/2025    HCT 43.7 01/10/2025    HCT 32 (L) 03/14/2023    MCV 94 07/22/2025    MCV 96 01/10/2025     (L) 07/22/2025     (L) 01/10/2025    INR 2.5 (H) 01/27/2025    INR 2.5 09/24/2024    TSH 1.592 07/22/2025    TSH 1.955 01/10/2025       Lab Results   Component Value Date    CHOL 102 (L) 07/22/2025    CHOL 114 (L) 01/10/2025    HDL 53 07/22/2025    TRIG 90 07/22/2025    TRIG 110 01/10/2025       Lab Results   Component Value Date    LDLCALC 31.0 (L) 07/22/2025         Imaging:     Echocardiogram  TTE 1/3/2024    Left Ventricle: The left ventricle is normal in size. Normal wall thickness. There is concentric remodeling. Normal wall motion. There is normal systolic function with a visually estimated ejection fraction of 55 - 60%. There is normal diastolic function.    Right Ventricle: Normal right ventricular cavity size. Wall thickness is normal. Right ventricle wall motion  is normal. Systolic function is normal.    Biatrial enlargement    Aortic Valve: There is moderate aortic valve sclerosis. Moderately restricted motion. There is mild stenosis. Aortic valve area by VTI is 1.63 cm². Aortic valve peak velocity is 2.36 m/s. Mean gradient is 12 mmHg. The dimensionless index is 0.40.    Pulmonary Artery: The estimated pulmonary artery systolic pressure is 23 mmHg.    IVC/SVC: Intermediate venous pressure at 8 mmHg.    Stress testing  AREN 1/31/2025    Left  Ventricle: The left ventricle is normal in size. Ventricular mass is normal. Mildly increased wall thickness. There is concentric remodeling. There is normal systolic function with a visually estimated ejection fraction of 55 - 60%. There is diastolic dysfunction but grade cannot be determined.    Right Ventricle: Mild right ventricular enlargement. Systolic function is normal.    Left Atrium: Left atrium is severely dilated.    Right Atrium: Catheter present in the right atrium.    Aortic Valve: There is moderate aortic valve sclerosis. Mildly restricted motion. There is mild stenosis. Aortic valve area by VTI is 1.4 cm2. Aortic valve peak velocity is 2.1 m/s. Mean gradient is 11 mmHg. The dimensionless index is 0.37.    IVC/SVC: Normal venous pressure at 3 mmHg.    Stress Protocol: The patient exercised for 6 minutes 34 seconds on a high ramp protocol, corresponding to a functional capacity of 11 estimated METS, achieving a peak heart rate of 131 bpm, which is 87% of the age predicted maximum heart rate. The patient reported chest discomfort during the stress test. The test was stopped because the patient experienced chest pain and fatigue.    Baseline ECG: The Baseline ECG reveals sinus bradycardia.    Stress ECG: There is 0.5 mm of horizontal ST segment depression in the inferolateral leads (II, III, aVF, V5 and V6) noted during stress.    ECG Conclusion: abnormal but not diagnostic due to resting ST-T abnormalities.    Post-stress Echo: The post-stress echo showed worsened wall motion abnormalities compared to baseline which are indicative of myocardial ischemia.    Post-stress Conclusion: The study is consistent with ischemia.    Cath Lab  [facility] 2020  LVEDP (Pre): 10  50% PDA stenosis iFR=0.98  Estimated blood loss: <50 mL    Other  None    [id number]  Unusual P axis, possible ectopic atrial bradycardia   Nonspecific ST and T wave abnormality   Abnormal ECG   When compared with ECG of 2023  10:16,   Ectopic atrial rhythm has replaced Sinus rhythm     Assessment:     1. Paroxysmal atrial fibrillation    2. Chronic anticoagulation    3. Coronary artery disease involving native coronary artery of native heart without angina pectoris    4. Aortic atherosclerosis    5. Essential hypertension    6. Hyperlipidemia associated with type 2 diabetes mellitus      Plan:     Paroxysmal atrial fibrillation  Long term (current) use of anticoagulants  Continue anticoagulant   Avoid NSAIDs  Reviewed importance of monitoring for bleeding   Reviewed injury precautions  Dicussed Watchman device    Coronary artery disease involving native coronary artery of native heart without angina pectoris  Aortic atherosclerosis  Nonobstructive CAD per [facility] in 2020  Asymptomatic, continue to monitor   Continue coumadin and rosuvastatin 20 mg qD    Essential hypertension  BP well controlled  Continue current medications and monitoring at home     Hyperlipidemia associated with type 2 diabetes mellitus  Continue rosuvastatin 20 mg qD  Last FLP 8/6/2024: LDL 29.2      Signed:  Gerri Osei PA-C  Cardiology     7/30/2025 8:17 AM    Follow-up:     Future Appointments   Date Time Provider Department Center   8/4/2025  8:20 AM Daniela Ellsworth MD [facility] DERM [facility]   9/4/2025 10:00 AM Geni Lopez, REENA [facility] DELON [place] [facility]    10/9/2025  1:00 PM NURSE 7, [facility] [place] [facility]   10/9/2025  2:00 PM [facility] BCC CT1 [facility] CT SUSAN [place]   10/13/2025  8:40 AM LAB, H[facility]ONC CANCER BLDG [facility] LAB HO [place]   10/13/2025  9:00 AM Miki Medrano MD [facility] HEMONC2 [place]   10/24/2025  8:40 AM Kirk Wilson MD [facility] IM [facility]   12/22/2025  1:45 PM [facility]RAY [facility]   12/22/2025  2:00 PM [facility] OIC-US1 MASTER [facility] ULTR [facility]   12/30/2025  8:00 AM Lukasz Hughes MD [facility] UROLOGC [place]   1/21/2026  8:40 AM Kirk Wilson MD [facility] IM [facility]

## 2025-08-04 ENCOUNTER — OFFICE VISIT (OUTPATIENT)
Dept: DERMATOLOGY | Facility: CLINIC | Age: 70
End: 2025-08-04
Payer: MEDICARE

## 2025-08-04 DIAGNOSIS — D18.01 CHERRY ANGIOMA: ICD-10-CM

## 2025-08-04 DIAGNOSIS — Z87.2 HISTORY OF ACTINIC KERATOSES: ICD-10-CM

## 2025-08-04 DIAGNOSIS — L82.1 SEBORRHEIC KERATOSES: Primary | ICD-10-CM

## 2025-08-04 PROCEDURE — 99999 PR PBB SHADOW E&M-EST. PATIENT-LVL II: CPT | Mod: PBBFAC,,, | Performed by: DERMATOLOGY

## 2025-08-04 PROCEDURE — 99212 OFFICE O/P EST SF 10 MIN: CPT | Mod: PBBFAC,PO | Performed by: DERMATOLOGY

## 2025-08-04 NOTE — PROGRESS NOTES
Subjective:      Patient ID:  Lukasz Person is a 70 y.o. male who presents for   Chief Complaint   Patient presents with    Follow-up     UBSE     Would like skin check, he used the efudex on previous acitinic keratoses which resolved.     Follow-up - Follow-up  Affected locations: face, scalp, right arm and left arm  Signs / symptoms: asymptomatic      Review of Systems   Constitutional:  Negative for fever, chills, weight loss, weight gain, fatigue and malaise.   Skin:  Positive for daily sunscreen use and activity-related sunscreen use. Negative for itching, rash and wears hat.   Hematologic/Lymphatic: Does not bruise/bleed easily.       Objective:   Physical Exam   Constitutional: He appears well-developed and well-nourished. No distress.   Neurological: He is alert and oriented to person, place, and time. He is not disoriented.   Psychiatric: He has a normal mood and affect.   Skin:   Areas Examined (abnormalities noted in diagram):   Head / Face Inspection Performed  Neck Inspection Performed  Chest / Axilla Inspection Performed  Abdomen Inspection Performed  Back Inspection Performed  RUE Inspected  LUE Inspection Performed  Nails and Digits Inspection Performed                 Diagram Legend     Erythematous scaling macule/papule c/w actinic keratosis       Vascular papule c/w angioma      Pigmented verrucoid papule/plaque c/w seborrheic keratosis      Yellow umbilicated papule c/w sebaceous hyperplasia      Irregularly shaped tan macule c/w lentigo     1-2 mm smooth white papules consistent with Milia      Movable subcutaneous cyst with punctum c/w epidermal inclusion cyst      Subcutaneous movable cyst c/w pilar cyst      Firm pink to brown papule c/w dermatofibroma      Pedunculated fleshy papule(s) c/w skin tag(s)      Evenly pigmented macule c/w junctional nevus     Mildly variegated pigmented, slightly irregular-bordered macule c/w mildly atypical nevus      Flesh colored to evenly pigmented papule  "c/w intradermal nevus       Pink pearly papule/plaque c/w basal cell carcinoma      Erythematous hyperkeratotic cursted plaque c/w SCC      Surgical scar with no sign of skin cancer recurrence      Open and closed comedones      Inflammatory papules and pustules      Verrucoid papule consistent consistent with wart     Erythematous eczematous patches and plaques     Dystrophic onycholytic nail with subungual debris c/w onychomycosis     Umbilicated papule    Erythematous-base heme-crusted tan verrucoid plaque consistent with inflamed seborrheic keratosis     Erythematous Silvery Scaling Plaque c/w Psoriasis     See annotation      Assessment / Plan:        Seborrheic keratoses  Seborrheic keratosis scattered, told benign no treatment needed.    Lentigines  The "ABCD" rules to observe pigmented lesions were reviewed.   No lesions suspicious for malignancy noted.    Recommend daily sun protection/avoidance and use of at least SPF 30, broad spectrum sunscreen (OTC drug).     Cherry angiomas  This is a benign vascular lesion. Reassurance given. No treatment required.       History of actinic keratoses  See previous note             Follow up in about 1 year (around 8/4/2026).  "

## 2025-08-18 DIAGNOSIS — I25.10 CORONARY ARTERY DISEASE INVOLVING NATIVE CORONARY ARTERY OF NATIVE HEART WITHOUT ANGINA PECTORIS: ICD-10-CM

## 2025-08-18 RX ORDER — NITROGLYCERIN 0.4 MG/1
TABLET SUBLINGUAL
Qty: 25 TABLET | Refills: 2 | Status: SHIPPED | OUTPATIENT
Start: 2025-08-18

## 2025-08-28 ENCOUNTER — PATIENT MESSAGE (OUTPATIENT)
Dept: PODIATRY | Facility: CLINIC | Age: 70
End: 2025-08-28
Payer: MEDICARE

## (undated) DEVICE — SPONGE COTTON TRAY 4X4IN

## (undated) DEVICE — GOWN SMARTGOWN LVL4 X-LONG XL

## (undated) DEVICE — TUBING SUC UNIV W/CONN 12FT

## (undated) DEVICE — PACK ECLIPSE SET-UP W/O DRAPE

## (undated) DEVICE — TRAY CYSTO BASIN

## (undated) DEVICE — SUT VICRYL 3-0 27 SH

## (undated) DEVICE — SPONGE DERMACEA GAUZE 4X4

## (undated) DEVICE — GUIDEWIRE STR TIP HIWIRE 150CM

## (undated) DEVICE — CATH POLLACK OPEN-END FLEXI-TI

## (undated) DEVICE — SYR SLIP TIP 1CC

## (undated) DEVICE — RELOAD SUREFORM 45 2.5 WHT 6R

## (undated) DEVICE — INTRODUCER LEAD 14FR

## (undated) DEVICE — TRAY CATH FOL SIL URIMTR 16FR

## (undated) DEVICE — KIT ANTIFOG W/SPONG & FLUID

## (undated) DEVICE — RELOAD ENDO GIA TRISTAPLE 45MM

## (undated) DEVICE — KIT GLIDESHEATH SLEND 6FR 10CM

## (undated) DEVICE — SYR 10CC LUER LOCK

## (undated) DEVICE — SYR ONLY LUER LOCK 20CC

## (undated) DEVICE — RELOAD SUREFORM 45 3.5 BLU 6R

## (undated) DEVICE — LUBRICANT SURGILUBE 2 OZ

## (undated) DEVICE — PROTECTION STATION PLUS

## (undated) DEVICE — SUT 0 VICRYL / UR6 (J603)

## (undated) DEVICE — DRAIN CHANNEL ROUND 19FR

## (undated) DEVICE — CONTAINER SPECIMEN STRL 4OZ

## (undated) DEVICE — WIRE GUIDE SAFE-T-J .035 260CM

## (undated) DEVICE — DRAPE ARM DAVINCI XI

## (undated) DEVICE — PACK CYSTO

## (undated) DEVICE — SOL NS 1000CC

## (undated) DEVICE — TROCAR ENDOPATH XCEL 5X100MM

## (undated) DEVICE — TUBE PENROSE DRAIN 18INX5/8IN

## (undated) DEVICE — OMNIPAQUE 350 200ML

## (undated) DEVICE — PAD DEFIB CADENCE ADULT R2

## (undated) DEVICE — FILTER STRAW

## (undated) DEVICE — SYR 60CC W/GAUGE

## (undated) DEVICE — UNDERGLOVES BIOGEL PI SZ 7 LF

## (undated) DEVICE — GEL RESTYLANE INJ GEL LIDO 1ML

## (undated) DEVICE — CART STAPLE RELD 45MM WHT

## (undated) DEVICE — HEMOSTAT VASC BAND REG 24CM

## (undated) DEVICE — NDL INSUF ULTRA VERESS 120MM

## (undated) DEVICE — UNDERGLOVES BIOGEL PI SIZE 7.5

## (undated) DEVICE — ADHESIVE DERMABOND ADVANCED

## (undated) DEVICE — SUT 2/0 30IN SILK BLK BRAI

## (undated) DEVICE — SHEATH URET FLEXOR 12FR 45CM

## (undated) DEVICE — ELECTRODE REM PLYHSV RETURN 9

## (undated) DEVICE — TROCAR ENDOPATH XCEL 12MM 10CM

## (undated) DEVICE — SOL 9P NACL IRR PIC IL

## (undated) DEVICE — GOWN X-LG STERILE BACK

## (undated) DEVICE — CATH JACKY RADIAL 3.5 110CM

## (undated) DEVICE — GUIDE WIRE MOTION .035 X 150CM

## (undated) DEVICE — ADAPTER HOSE 10FT 8MM

## (undated) DEVICE — DRAPE SCOPE PILLOW WARMER

## (undated) DEVICE — KIT CUSTOM MANIFOLD

## (undated) DEVICE — DRAIN PENROSE XRAY 18 X 1/4 ST

## (undated) DEVICE — SET CYSTO IRRIGATION UNIV SPIK

## (undated) DEVICE — KIT VUETIP TROCAR SWAB

## (undated) DEVICE — PACK DRAPE UNIVERSAL CONVERTOR

## (undated) DEVICE — BLLN ESOPHAGEAL 18-20MM

## (undated) DEVICE — DRAIN CHEST DRY SUCTION

## (undated) DEVICE — STAPLER ENDO GIA ULT UNIV 12MM

## (undated) DEVICE — SYR SLIP TIP 20CC

## (undated) DEVICE — RELOAD SUREFORM 45 4.3 GRN 6R

## (undated) DEVICE — SOL CLEARIFY VISUALIZATION LAP

## (undated) DEVICE — DRAPE COLUMN DAVINCI XI

## (undated) DEVICE — SEE MEDLINE ITEM 156894

## (undated) DEVICE — SUT ETHILON 2-0 PSLX 30IN

## (undated) DEVICE — EXTRACTOR TIPLESS 2.4FRX1115CM

## (undated) DEVICE — SUT SILK 2-0 SH 18IN BLACK

## (undated) DEVICE — GUIDE LAUNCHER 6FR JR 4.0

## (undated) DEVICE — KIT CO-PILOT

## (undated) DEVICE — STAPLER SUREFORM CRVD TIP 45

## (undated) DEVICE — GUIDEWIRE VERRATA + JTIP 185CM

## (undated) DEVICE — SEE MEDLINE ITEM 157187

## (undated) DEVICE — SEE MEDLINE ITEM 156902

## (undated) DEVICE — SET IRR URLGY 2LINE UNIV SPIKE

## (undated) DEVICE — SUT VICRYL PLUS 3-0 SH 18IN

## (undated) DEVICE — DRAIN SINGLE ROUND 3/16 15F

## (undated) DEVICE — DRAPE THREE-QTR REINF 53X77IN

## (undated) DEVICE — SUT SILK 3-0 SH 18IN BLACK

## (undated) DEVICE — FIBER LASER HOLMIUM 200MH

## (undated) DEVICE — SOL IRR NACL .9% 3000ML

## (undated) DEVICE — PACK UNIVERSAL SPLIT II

## (undated) DEVICE — SUT CHROMIC 2-0 SH 27IN BRN

## (undated) DEVICE — STAPLER INT LINEAR ARTC 3.5-45

## (undated) DEVICE — SUT MONOCRYL 4-0 PS-2

## (undated) DEVICE — DISSECTOR 5MM ENDOPATH

## (undated) DEVICE — SPIKE CONTRAST CONTROLLER

## (undated) DEVICE — SEAL UNIVERSAL 5MM-8MM XI

## (undated) DEVICE — TRAY ENT 4/CS

## (undated) DEVICE — SUT 3-0 12-18IN SILK

## (undated) DEVICE — SUT SILK 0 SH 30IN BLK BR

## (undated) DEVICE — Device

## (undated) DEVICE — BLADE SURG CARBON STEEL SZ11

## (undated) DEVICE — CANNULA SEAL 12MM

## (undated) DEVICE — CANNULA REDUCER 12-8MM

## (undated) DEVICE — COVER LIGHT HANDLE